# Patient Record
Sex: FEMALE | Race: BLACK OR AFRICAN AMERICAN | NOT HISPANIC OR LATINO | Employment: OTHER | URBAN - METROPOLITAN AREA
[De-identification: names, ages, dates, MRNs, and addresses within clinical notes are randomized per-mention and may not be internally consistent; named-entity substitution may affect disease eponyms.]

---

## 2017-07-18 PROBLEM — E83.59 CALCIPHYLAXIS: Status: ACTIVE | Noted: 2017-07-18

## 2017-07-18 PROBLEM — I48.91 ATRIAL FIBRILLATION: Status: ACTIVE | Noted: 2017-07-18

## 2017-07-18 PROBLEM — E83.51 HYPOCALCEMIA: Status: ACTIVE | Noted: 2017-07-18

## 2017-07-18 PROBLEM — E21.3 HYPERPARATHYROIDISM: Status: ACTIVE | Noted: 2017-07-18

## 2017-07-20 PROBLEM — L97.922 CALCIPHYLAXIS OF LEFT LOWER EXTREMITY WITH NONHEALING ULCER WITH FAT LAYER EXPOSED: Status: ACTIVE | Noted: 2017-07-20

## 2017-07-20 PROBLEM — E83.59 CALCIPHYLAXIS OF RIGHT LOWER EXTREMITY WITH NONHEALING ULCER WITH FAT LAYER EXPOSED: Status: ACTIVE | Noted: 2017-07-20

## 2017-07-20 PROBLEM — L97.912 CALCIPHYLAXIS OF RIGHT LOWER EXTREMITY WITH NONHEALING ULCER WITH FAT LAYER EXPOSED: Status: ACTIVE | Noted: 2017-07-20

## 2017-07-25 PROBLEM — D64.9 ANEMIA: Status: ACTIVE | Noted: 2017-07-25

## 2017-07-25 PROBLEM — R79.89 ELEVATED TROPONIN: Status: ACTIVE | Noted: 2017-07-25

## 2017-07-25 PROBLEM — I27.20 PULMONARY HYPERTENSION: Status: ACTIVE | Noted: 2017-07-25

## 2017-07-25 PROBLEM — I34.0 NON-RHEUMATIC MITRAL REGURGITATION: Status: ACTIVE | Noted: 2017-07-25

## 2017-08-01 ENCOUNTER — TELEPHONE (OUTPATIENT)
Dept: CARDIOLOGY | Facility: CLINIC | Age: 72
End: 2017-08-01

## 2017-08-01 NOTE — TELEPHONE ENCOUNTER
Patient was referred by Dr Barr for evaluation of mitral regurg.  She has ?HOCM and is on peritoneal dialysis.  Dr Bejarano has reviewed the echo with Dr Kapoor and they spoke with Dr Barr suggesting medical therapy.  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  Patient and family aware of decision.

## 2017-08-22 LAB
ALBUMIN SERPL-MCNC: 1.4 G/DL (ref 3.1–4.7)
ALP SERPL-CCNC: 113 IU/L (ref 40–104)
ALT (SGPT): 12 IU/L (ref 3–33)
AST SERPL-CCNC: 21 IU/L (ref 10–40)
BILIRUB SERPL-MCNC: 0.7 MG/DL (ref 0.3–1)
BNP SERPL-MCNC: 1659 PG/ML (ref 0–100)
BUN SERPL-MCNC: 19 MG/DL (ref 8–20)
CALCIUM SERPL-MCNC: 6.5 MG/DL (ref 7.7–10.4)
CHLORIDE: 93 MMOL/L (ref 98–110)
CO2 SERPL-SCNC: 28.1 MMOL/L (ref 22.8–31.6)
CREATININE: 6.46 MG/DL (ref 0.6–1.4)
GLUCOSE: 69 MG/DL (ref 70–99)
HCT VFR BLD AUTO: 30.8 % (ref 36–48)
HGB BLD-MCNC: 10.1 G/DL (ref 12–15)
MAGNESIUM SERPL-MCNC: 1.2 MG/DL (ref 1.5–2.6)
MCH RBC QN AUTO: 27.7 PG (ref 25–35)
MCHC RBC AUTO-ENTMCNC: 32.8 G/DL (ref 31–36)
MCV RBC AUTO: 84.4 FL (ref 79–98)
NUCLEATED RBCS: 0 %
PHOSPHATE FLD-MCNC: 3.3 MG/DL (ref 2.5–4.9)
PLATELET # BLD AUTO: 346 K/UL (ref 140–440)
POTASSIUM SERPL-SCNC: 2.6 MMOL/L (ref 3.5–5)
PROT SERPL-MCNC: 5.6 G/DL (ref 6–8.2)
RBC # BLD AUTO: 3.65 M/UL (ref 3.5–5.5)
SODIUM: 132 MMOL/L (ref 134–144)
WBC # BLD AUTO: 5.2 K/UL (ref 5–10)

## 2017-09-19 PROBLEM — Z99.2 PERITONEAL DIALYSIS CATHETER IN PLACE: Status: ACTIVE | Noted: 2017-09-19

## 2017-11-13 ENCOUNTER — OFFICE VISIT (OUTPATIENT)
Dept: CARDIOLOGY | Facility: CLINIC | Age: 72
End: 2017-11-13
Payer: MEDICARE

## 2017-11-13 ENCOUNTER — HOSPITAL ENCOUNTER (OUTPATIENT)
Dept: CARDIOLOGY | Facility: CLINIC | Age: 72
Discharge: HOME | End: 2017-11-13
Payer: MEDICARE

## 2017-11-13 VITALS
SYSTOLIC BLOOD PRESSURE: 104 MMHG | WEIGHT: 113.75 LBS | BODY MASS INDEX: 18.95 KG/M2 | DIASTOLIC BLOOD PRESSURE: 58 MMHG | OXYGEN SATURATION: 99 % | HEIGHT: 65 IN | HEART RATE: 76 BPM

## 2017-11-13 DIAGNOSIS — Z99.2 PERITONEAL DIALYSIS CATHETER IN PLACE: ICD-10-CM

## 2017-11-13 DIAGNOSIS — E21.3 HYPERPARATHYROIDISM: ICD-10-CM

## 2017-11-13 DIAGNOSIS — I10 ESSENTIAL HYPERTENSION: ICD-10-CM

## 2017-11-13 DIAGNOSIS — N18.6 TYPE 2 DIABETES MELLITUS WITH CHRONIC KIDNEY DISEASE ON CHRONIC DIALYSIS, WITHOUT LONG-TERM CURRENT USE OF INSULIN: ICD-10-CM

## 2017-11-13 DIAGNOSIS — I48.0 PAROXYSMAL ATRIAL FIBRILLATION: Primary | ICD-10-CM

## 2017-11-13 DIAGNOSIS — Z99.2 TYPE 2 DIABETES MELLITUS WITH CHRONIC KIDNEY DISEASE ON CHRONIC DIALYSIS, WITHOUT LONG-TERM CURRENT USE OF INSULIN: ICD-10-CM

## 2017-11-13 DIAGNOSIS — R06.00 PND (PAROXYSMAL NOCTURNAL DYSPNEA): ICD-10-CM

## 2017-11-13 DIAGNOSIS — I27.20 PULMONARY HYPERTENSION: ICD-10-CM

## 2017-11-13 DIAGNOSIS — I34.0 MITRAL VALVE INSUFFICIENCY, UNSPECIFIED ETIOLOGY: ICD-10-CM

## 2017-11-13 DIAGNOSIS — N18.6 ESRD (END STAGE RENAL DISEASE): ICD-10-CM

## 2017-11-13 DIAGNOSIS — I34.0 NON-RHEUMATIC MITRAL REGURGITATION: ICD-10-CM

## 2017-11-13 DIAGNOSIS — E11.22 TYPE 2 DIABETES MELLITUS WITH CHRONIC KIDNEY DISEASE ON CHRONIC DIALYSIS, WITHOUT LONG-TERM CURRENT USE OF INSULIN: ICD-10-CM

## 2017-11-13 DIAGNOSIS — I34.0 MITRAL VALVE INSUFFICIENCY, UNSPECIFIED ETIOLOGY: Primary | ICD-10-CM

## 2017-11-13 PROBLEM — I50.30 DIASTOLIC HEART FAILURE: Status: ACTIVE | Noted: 2017-11-13

## 2017-11-13 LAB
ESTIMATED PA SYSTOLIC PRESSURE: 29.42
MITRAL VALVE MOBILITY: ABNORMAL
MITRAL VALVE REGURGITATION: ABNORMAL
MITRAL VALVE STENOSIS: ABNORMAL
RETIRED EF AND QEF - SEE NOTES: 75 (ref 55–65)
TRICUSPID VALVE REGURGITATION: ABNORMAL

## 2017-11-13 PROCEDURE — 99203 OFFICE O/P NEW LOW 30 MIN: CPT | Mod: S$GLB,,, | Performed by: INTERNAL MEDICINE

## 2017-11-13 PROCEDURE — 99999 PR PBB SHADOW E&M-EST. PATIENT-LVL III: CPT | Mod: PBBFAC,,, | Performed by: INTERNAL MEDICINE

## 2017-11-13 PROCEDURE — 93306 TTE W/DOPPLER COMPLETE: CPT | Mod: S$GLB,,, | Performed by: INTERNAL MEDICINE

## 2017-11-13 RX ORDER — LORAZEPAM 0.5 MG/1
0.5 TABLET ORAL DAILY PRN
COMMUNITY
End: 2019-03-07

## 2017-11-13 RX ORDER — MIDODRINE HYDROCHLORIDE 5 MG/1
2.5 TABLET ORAL 2 TIMES DAILY WITH MEALS
COMMUNITY
End: 2019-03-07

## 2017-11-13 RX ORDER — TRAMADOL HYDROCHLORIDE 100 MG/1
50 TABLET, EXTENDED RELEASE ORAL DAILY
COMMUNITY
End: 2019-03-07

## 2017-11-13 NOTE — PROGRESS NOTES
"Subjective:    Patient ID:  Griselda Neely is a 72 y.o. female who presents for evaluation of diastolic HF, MR, and concentric LVH.    REF:  Alonso Barr    HPI: This patient of Alonso Barr has ESRD on PD and multiple recurrent admissions for CHF. By history, she has MAC, Moderate to severe MR, aortic valve calcification, severe TR, LAE, and concentric LVH.  Her echo was reviewed by me and Gene and I told Wes that she was best managed conservatively, however, she wanted to come and see me anyway.  She is relatively asymptomatic unless she gets "fluid in the lungs" and she is usually admitted and dialyzed for this problem.      Review of Systems   Constitution: Negative for weakness, malaise/fatigue, weight gain and weight loss.   HENT: Negative for congestion, nosebleeds and sore throat.    Eyes: Negative for blurred vision, double vision, pain and visual disturbance.   Cardiovascular: Negative for chest pain, claudication, cyanosis, dyspnea on exertion, irregular heartbeat, leg swelling, near-syncope, orthopnea, palpitations, paroxysmal nocturnal dyspnea and syncope.   Respiratory: Negative for cough, hemoptysis, shortness of breath, snoring, sputum production and wheezing.    Endocrine: Negative for polydipsia and polyuria.   Hematologic/Lymphatic: Negative for bleeding problem. Does not bruise/bleed easily.   Skin: Negative for color change, poor wound healing and rash.   Musculoskeletal: Negative for arthritis, back pain, joint pain, muscle weakness, myalgias and neck pain.   Gastrointestinal: Negative for abdominal pain, anorexia, constipation, diarrhea, heartburn, hematemesis, hematochezia, hemorrhoids, jaundice, melena, nausea and vomiting.   Genitourinary: Negative for flank pain, hematuria, hesitancy, incomplete emptying, menorrhagia and nocturia.   Neurological: Negative for excessive daytime sleepiness, dizziness, focal weakness, headaches, light-headedness, loss of balance, numbness, paresthesias, " seizures, sensory change, tremors and vertigo.   Psychiatric/Behavioral: Negative for altered mental status, depression, hallucinations and memory loss. The patient does not have insomnia and is not nervous/anxious.         Objective:    Physical Exam   Constitutional: She appears well-developed.   thin   HENT:   Head: Normocephalic.   Eyes: Pupils are equal, round, and reactive to light.   Neck: Normal range of motion. Neck supple. Normal carotid pulses, no hepatojugular reflux and no JVD present. No spinous process tenderness and no muscular tenderness present. Carotid bruit is not present. No neck rigidity. No tracheal deviation, no edema, no erythema and normal range of motion present. No thyromegaly present.   Cardiovascular: Normal rate, regular rhythm, S1 normal, S2 normal and intact distal pulses.  PMI is not displaced.  Exam reveals no gallop, no distant heart sounds, no friction rub, no midsystolic click, no opening snap and no decreased pulses.    Murmur heard.  High-pitched blowing holosystolic murmur is present with a grade of 3/6  at the apex  Pulses:       Carotid pulses are 2+ on the right side, and 2+ on the left side.       Radial pulses are 2+ on the right side, and 2+ on the left side.        Femoral pulses are 2+ on the right side, and 2+ on the left side.       Popliteal pulses are 2+ on the right side, and 2+ on the left side.        Dorsalis pedis pulses are 2+ on the right side, and 2+ on the left side.        Posterior tibial pulses are 2+ on the right side, and 2+ on the left side.   Pulmonary/Chest: No accessory muscle usage. No tachypnea. No respiratory distress. She has no decreased breath sounds. She has no wheezes. She has no rhonchi. She has no rales. She exhibits no tenderness.   Abdominal: Normal appearance. She exhibits no distension, no abdominal bruit, no ascites and no mass. There is no hepatosplenomegaly. There is no tenderness. There is no rebound, no guarding and no CVA  tenderness. No hernia.   Genitourinary: No breast swelling, tenderness, discharge or bleeding.   Musculoskeletal: She exhibits no edema or tenderness.        Right shoulder: She exhibits normal range of motion, no tenderness, no bony tenderness, no swelling, no effusion, no deformity, no laceration, no pain, normal pulse and normal strength.   Lymphadenopathy:     She has no cervical adenopathy.   Neurological: She is alert. She has normal reflexes. She displays no atrophy and no tremor. No cranial nerve deficit or sensory deficit. She exhibits normal muscle tone. She displays a negative Romberg sign. She displays no seizure activity. Coordination and gait normal.   Reflex Scores:       Tricep reflexes are 2+ on the right side and 2+ on the left side.       Bicep reflexes are 2+ on the right side and 2+ on the left side.       Brachioradialis reflexes are 2+ on the right side and 2+ on the left side.       Patellar reflexes are 2+ on the right side and 2+ on the left side.       Achilles reflexes are 2+ on the right side and 2+ on the left side.  Skin: Skin is warm, dry and intact. No abrasion, no bruising, no ecchymosis, no lesion and no rash noted. She is not diaphoretic. No cyanosis or erythema. No pallor. Nails show no clubbing.   Psychiatric: She has a normal mood and affect. Her speech is normal and behavior is normal. Judgment and thought content normal. Cognition and memory are normal.         Assessment:       1. Paroxysmal atrial fibrillation    2. Essential hypertension    3. ESRD (end stage renal disease): On PD.    4. Hyperparathyroidism    5. Type 2 diabetes mellitus with chronic kidney disease on chronic dialysis, without long-term current use of insulin    6. Peritoneal dialysis catheter in place    7. Non-rheumatic mitral regurgitation: by exam and echo report.  Current echo pending.   8. Pulmonary hypertension    9. PND (paroxysmal nocturnal dyspnea)    10.    Diastolic HF by history with  concentric LVH, MR, and TR.  NYHA Class II now.       Plan:       TTE today.  Will review and see her after the echo.    Addendum: Echo reviewed.  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  This is an inoperable situation.  She isn't a candidate for mitral clip because of MS.  She isn't a candidate for MVR because of MAC and ESRD on HD. The best management for her is to keep her HR as slow as possible using a combination of calcium channel blockers and beta blockers for both the HOCM and MS.  Will send this recommendation to Dr. Leonel Barr.

## 2017-11-13 NOTE — LETTER
November 13, 2017      Alonso Barr MD  [address]  [address]  [place] [address]           [facility]  [address]  [facility] [address]  Phone: [phone]          Patient: Griselda Neely   MR Number: [id number]   YOB: 1945   Date of Visit: 11/13/2017       Dear Dr. Alonso Barr:    Thank you for referring Griselda Neely to me for evaluation. Attached you will find relevant portions of my assessment and plan of care.    If you have questions, please do not hesitate to call me. I look forward to following Griselda Neely along with you.    Sincerely,    Rodríguez Bejarano MD    Enclosure  CC:  No Recipients    If you would like to receive this communication electronically, please contact [email or web address] or [phone] to request more information on [employer] access.    For providers and/or their staff who would like to refer a patient to [facility], please contact us through our one-stop-shop provider referral line, [facility], at [phone].    If you feel you have received this communication in error or would no longer like to receive these types of communications, please e-mail [email or web address]

## 2017-11-13 NOTE — PROGRESS NOTES
Subjective:    Patient ID:  Griselda Neely is a 72 y.o. female who presents for evaluation of MR.  Referring: Dr. Dove     HPI   Ms. Neely is a 71-year-old lady here for evaluation of Mr. She has a PMHx of hypertension, hyperlipidemia, end-stage renal disease, hyperparathyroidism, s/p parathyroidectomy,  and atrial fibrillation managed by Dr. Barr on amiodarone and Plavix.        ROS     Objective:    Physical Exam      Assessment:       1. Non-rheumatic mitral regurgitation- ECHO 7/16, unable to access. Will repeat ECHO today    2. Essential hypertension - Controlled on    3. ESRD (end stage renal disease) -on PD   4. Hyperparathyroidism - s/p parathyroidectomy    5. Type 2 diabetes mellitus with chronic kidney disease on chronic dialysis, without long-term current use of insulin    6. Peritoneal dialysis catheter in place    7.  Paroxysmal atrial fibrillation -On amiodarone        Plan:       ECHO

## 2019-03-07 ENCOUNTER — OFFICE VISIT (OUTPATIENT)
Dept: PULMONOLOGY | Facility: CLINIC | Age: 74
End: 2019-03-07
Payer: MEDICARE

## 2019-03-07 VITALS
WEIGHT: 146 LBS | BODY MASS INDEX: 24.32 KG/M2 | SYSTOLIC BLOOD PRESSURE: 130 MMHG | HEIGHT: 65 IN | OXYGEN SATURATION: 99 % | DIASTOLIC BLOOD PRESSURE: 80 MMHG | HEART RATE: 81 BPM

## 2019-03-07 DIAGNOSIS — J43.8 OTHER EMPHYSEMA: ICD-10-CM

## 2019-03-07 DIAGNOSIS — R09.02 HYPOXEMIA: ICD-10-CM

## 2019-03-07 DIAGNOSIS — F41.9 ANXIETY: ICD-10-CM

## 2019-03-07 DIAGNOSIS — Z87.891 PERSONAL HISTORY OF TOBACCO USE, PRESENTING HAZARDS TO HEALTH: ICD-10-CM

## 2019-03-07 DIAGNOSIS — J44.9 CHRONIC OBSTRUCTIVE PULMONARY DISEASE, UNSPECIFIED COPD TYPE: Primary | ICD-10-CM

## 2019-03-07 DIAGNOSIS — I27.20 PULMONARY HYPERTENSION: ICD-10-CM

## 2019-03-07 PROCEDURE — 99214 OFFICE O/P EST MOD 30 MIN: CPT | Mod: ,,, | Performed by: INTERNAL MEDICINE

## 2019-03-07 PROCEDURE — 99214 PR OFFICE/OUTPT VISIT, EST, LEVL IV, 30-39 MIN: ICD-10-PCS | Mod: ,,, | Performed by: INTERNAL MEDICINE

## 2019-03-07 RX ORDER — NAPROXEN SODIUM 220 MG/1
81 TABLET, FILM COATED ORAL DAILY
COMMUNITY
End: 2020-02-02

## 2019-03-07 RX ORDER — ISOSORBIDE MONONITRATE 30 MG/1
30 TABLET, EXTENDED RELEASE ORAL DAILY
COMMUNITY
End: 2021-01-11 | Stop reason: SDUPTHER

## 2019-03-07 RX ORDER — ALPRAZOLAM 0.25 MG/1
0.25 TABLET ORAL 2 TIMES DAILY PRN
Qty: 30 TABLET | Refills: 1 | Status: SHIPPED | OUTPATIENT
Start: 2019-03-07 | End: 2019-08-06 | Stop reason: ALTCHOICE

## 2019-03-07 RX ORDER — BUDESONIDE AND FORMOTEROL FUMARATE DIHYDRATE 160; 4.5 UG/1; UG/1
2 AEROSOL RESPIRATORY (INHALATION) EVERY 12 HOURS
Status: ON HOLD | COMMUNITY
End: 2019-08-09 | Stop reason: HOSPADM

## 2019-03-07 RX ORDER — METOPROLOL SUCCINATE 25 MG/1
25 TABLET, EXTENDED RELEASE ORAL DAILY
Status: ON HOLD | COMMUNITY
End: 2019-08-09 | Stop reason: HOSPADM

## 2019-03-07 NOTE — PROGRESS NOTES
Office Visit    HPI:    3/7/2019 - Here for follow up from hospital.  Has been stable at home but does have episodes of dyspnea .  Has seen Dr Griffin and has been recommended for valve surgery.  No new symptoms.  Had long d/w pt about her diagnoses.  She is requesting something for anxiety.  Has not been smoking since hospital stay.    Medications      Current Outpatient Medications:     albuterol-ipratropium 2.5mg-0.5mg/3mL (DUO-NEB) 0.5 mg-3 mg(2.5 mg base)/3 mL nebulizer solution, Take 3 mLs by nebulization every 6 (six) hours as needed for Wheezing. Rescue, Disp: 20 vial, Rfl: 0    aspirin (ECOTRIN) 81 MG EC tablet, Take 81 mg by mouth once daily., Disp: , Rfl:     budesonide-formoterol 160-4.5 mcg (SYMBICORT) 160-4.5 mcg/actuation HFAA, Inhale 2 puffs into the lungs every 12 (twelve) hours. Controller, Disp: , Rfl:     isosorbide mononitrate (IMDUR) 30 MG 24 hr tablet, Take 30 mg by mouth once daily., Disp: , Rfl:     metoprolol succinate (TOPROL-XL) 25 MG 24 hr tablet, Take 25 mg by mouth once daily., Disp: , Rfl:     ALPRAZolam (XANAX) 0.25 MG tablet, Take 1 tablet (0.25 mg total) by mouth 2 (two) times daily as needed for Anxiety., Disp: 30 tablet, Rfl: 1    Allergies    Review of patient's allergies indicates:  No Known Allergies    Social History    Social History     Tobacco Use   Smoking Status Current Some Day Smoker    Packs/day: 0.50    Years: 45.00    Pack years: 22.50   Smokeless Tobacco Never Used       ETOH - no drinks per week.    Drug Use - no    Occupation - retired    Family History    Family History   Problem Relation Age of Onset    Heart disease Sister     Early death Sister         heart as baby    Heart disease Maternal Grandfather     Diabetes Mother     Hypertension Mother     Heart failure Mother     Heart disease Mother     Arthritis Mother     Diabetes Father     Early death Sister         infant       ROS  Review of Systems   Constitutional: Positive for  "malaise/fatigue. Negative for chills, diaphoresis, fever and weight loss.   HENT: Negative for congestion.    Eyes: Negative for pain.   Respiratory: Positive for shortness of breath. Negative for cough, hemoptysis, sputum production, wheezing and stridor.    Cardiovascular: Negative for chest pain, palpitations, orthopnea, claudication, leg swelling and PND.   Gastrointestinal: Negative for abdominal pain, constipation, diarrhea, heartburn, nausea and vomiting.   Genitourinary: Negative for dysuria, frequency and urgency.   Musculoskeletal: Negative for falls and myalgias.   Neurological: Negative for dizziness, tingling, tremors, sensory change, speech change, focal weakness, seizures, loss of consciousness, weakness and headaches.   Psychiatric/Behavioral: Negative for depression, substance abuse and suicidal ideas. The patient is not nervous/anxious.        Physical Exam    Vitals:    03/07/19 0909   BP: 130/80   Pulse: 81   SpO2: 99%   Weight: 66.2 kg (146 lb)   Height: 5' 5" (1.651 m)       Physical Exam   Constitutional: She is oriented to person, place, and time. She appears well-developed and well-nourished. No distress.   HENT:   Head: Normocephalic and atraumatic.   Right Ear: External ear normal.   Left Ear: External ear normal.   Nose: Nose normal.   Mouth/Throat: Oropharynx is clear and moist.   Eyes: Conjunctivae and EOM are normal. Pupils are equal, round, and reactive to light.   Neck: Normal range of motion. Neck supple. No JVD present. No tracheal deviation present. No thyromegaly present.   Cardiovascular: Normal rate, regular rhythm and intact distal pulses. Exam reveals no gallop and no friction rub.   Murmur heard.  Pulmonary/Chest: Effort normal. No stridor. No respiratory distress. She has no wheezes. She has rales. She exhibits no tenderness.   Few rales   Abdominal: Soft. Bowel sounds are normal. She exhibits no distension. There is no tenderness.   Musculoskeletal: Normal range of motion. " She exhibits no edema or tenderness.   Lymphadenopathy:     She has no cervical adenopathy.   Neurological: She is alert and oriented to person, place, and time. No cranial nerve deficit.   Skin: Skin is warm and dry. She is not diaphoretic.   Psychiatric: She has a normal mood and affect. Her behavior is normal.   Nursing note and vitals reviewed.      Lab    @RESUFAST (WBC,HGB,HCT,PLT)@    Sodium   Date Value Ref Range Status   09/19/2017 137 136 - 145 mmol/L Final   08/22/2017 132 (L) 134 - 144 mmol/L      Potassium   Date Value Ref Range Status   09/19/2017 3.7 3.5 - 5.1 mmol/L Final     Chloride   Date Value Ref Range Status   09/19/2017 103 95 - 110 mmol/L Final   08/22/2017 93 (L) 98 - 110 mmol/L      CO2   Date Value Ref Range Status   09/19/2017 28 22 - 31 mmol/L Final     Glucose   Date Value Ref Range Status   09/19/2017 61 (L) 70 - 110 mg/dL Final     Comment:     The ADA recommends the following guidelines for fasting glucose:  Normal:       less than 100 mg/dL  Prediabetes:  100 mg/dL to 125 mg/dL  Diabetes:     126 mg/dL or higher     08/22/2017 69 (L) 70 - 99 mg/dL      BUN, Bld   Date Value Ref Range Status   09/19/2017 20 (H) 7 - 18 mg/dL Final     Creatinine   Date Value Ref Range Status   09/19/2017 5.19 (H) 0.50 - 1.40 mg/dL Final   08/22/2017 6.46 (H) 0.60 - 1.40 mg/dL    07/17/2012 1.4 0.2 - 1.4 mg/dl Final     Calcium   Date Value Ref Range Status   09/19/2017 6.6 (LL) 8.4 - 10.2 mg/dL Final     Comment:     CA  critical result(s) called and verbal readback obtained from   Lexiiop Risa, 09/19/2017 06:55     07/17/2012 9.1 8.6 - 10.2 mg/dl Final     Total Protein   Date Value Ref Range Status   08/22/2017 5.6 (L) 6.0 - 8.2 g/dL      Albumin   Date Value Ref Range Status   08/22/2017 1.4 (L) 3.1 - 4.7 g/dL      Total Bilirubin   Date Value Ref Range Status   08/22/2017 0.7 0.3 - 1.0 mg/dL      Alkaline Phosphatase   Date Value Ref Range Status   08/22/2017 113 (H) 40 - 104 IU/L    07/15/2012  71 23 - 119 UNIT/L Final     AST   Date Value Ref Range Status   08/22/2017 21 10 - 40 IU/L    07/15/2012 13 10 - 30 UNIT/L Final     ALT   Date Value Ref Range Status   07/25/2017 12 10 - 44 U/L Final     Anion Gap   Date Value Ref Range Status   09/19/2017 6 (L) 8 - 16 mmol/L Final   07/17/2012 6 5 - 15 meq/L Final         Xray        Impression/Plan    Problem List Items Addressed This Visit        Psychiatric    Anxiety  - will give low dose xanax for anxiety (d/w pt)       Pulmonary    Pulmonary hypertension  - due to group 2 and group 3    Other emphysema  - will order studies  - RTC 3 months    Hypoxemia  - continue with oxygen       Other    Personal history of tobacco use, presenting hazards to health  - not smoking at this      Other Visit Diagnoses     Chronic obstructive pulmonary disease, unspecified COPD type    -  Primary    Relevant Orders    Complete PFT with bronchodilator        Ian Mcallister MD

## 2019-03-20 ENCOUNTER — HISTORICAL (OUTPATIENT)
Dept: ADMINISTRATIVE | Facility: HOSPITAL | Age: 74
End: 2019-03-20

## 2019-03-22 ENCOUNTER — OUTSIDE PLACE OF SERVICE (OUTPATIENT)
Dept: PULMONOLOGY | Facility: CLINIC | Age: 74
End: 2019-03-22
Payer: MEDICARE

## 2019-03-22 PROCEDURE — 99232 SBSQ HOSP IP/OBS MODERATE 35: CPT | Mod: ,,, | Performed by: INTERNAL MEDICINE

## 2019-03-22 PROCEDURE — 99232 PR SUBSEQUENT HOSPITAL CARE,LEVL II: ICD-10-PCS | Mod: ,,, | Performed by: INTERNAL MEDICINE

## 2019-03-23 ENCOUNTER — OUTSIDE PLACE OF SERVICE (OUTPATIENT)
Dept: PULMONOLOGY | Facility: CLINIC | Age: 74
End: 2019-03-23
Payer: MEDICARE

## 2019-03-23 PROCEDURE — 99232 PR SUBSEQUENT HOSPITAL CARE,LEVL II: ICD-10-PCS | Mod: ,,, | Performed by: INTERNAL MEDICINE

## 2019-03-23 PROCEDURE — 99232 SBSQ HOSP IP/OBS MODERATE 35: CPT | Mod: ,,, | Performed by: INTERNAL MEDICINE

## 2019-03-24 ENCOUNTER — OUTSIDE PLACE OF SERVICE (OUTPATIENT)
Dept: PULMONOLOGY | Facility: CLINIC | Age: 74
End: 2019-03-24
Payer: MEDICARE

## 2019-03-24 PROCEDURE — 99231 PR SUBSEQUENT HOSPITAL CARE,LEVL I: ICD-10-PCS | Mod: ,,, | Performed by: INTERNAL MEDICINE

## 2019-03-24 PROCEDURE — 99231 SBSQ HOSP IP/OBS SF/LOW 25: CPT | Mod: ,,, | Performed by: INTERNAL MEDICINE

## 2019-08-06 ENCOUNTER — HOSPITAL ENCOUNTER (INPATIENT)
Facility: HOSPITAL | Age: 74
LOS: 13 days | Discharge: HOME | DRG: 193 | End: 2019-08-19
Attending: EMERGENCY MEDICINE | Admitting: INTERNAL MEDICINE
Payer: MEDICARE

## 2019-08-06 DIAGNOSIS — G92.9 ENCEPHALOPATHY, TOXIC: ICD-10-CM

## 2019-08-06 DIAGNOSIS — I48.91 ATRIAL FIBRILLATION WITH RAPID VENTRICULAR RESPONSE: ICD-10-CM

## 2019-08-06 DIAGNOSIS — J90 PLEURAL EFFUSION: ICD-10-CM

## 2019-08-06 DIAGNOSIS — R41.82 ALTERED MENTAL STATUS, UNSPECIFIED ALTERED MENTAL STATUS TYPE: Primary | ICD-10-CM

## 2019-08-06 DIAGNOSIS — R56.9 SEIZURE: ICD-10-CM

## 2019-08-06 DIAGNOSIS — E16.2 HYPOGLYCEMIA: ICD-10-CM

## 2019-08-06 DIAGNOSIS — I63.9 STROKE: ICD-10-CM

## 2019-08-06 DIAGNOSIS — N18.6 ESRD (END STAGE RENAL DISEASE): ICD-10-CM

## 2019-08-06 DIAGNOSIS — J40 BRONCHITIS: ICD-10-CM

## 2019-08-06 DIAGNOSIS — R06.02 SHORTNESS OF BREATH: ICD-10-CM

## 2019-08-06 DIAGNOSIS — A41.9 SEPSIS, DUE TO UNSPECIFIED ORGANISM: ICD-10-CM

## 2019-08-06 PROBLEM — N18.9 ANEMIA DUE TO CHRONIC KIDNEY DISEASE: Status: ACTIVE | Noted: 2017-07-25

## 2019-08-06 PROBLEM — R79.89 ELEVATED LACTIC ACID LEVEL: Status: ACTIVE | Noted: 2019-08-06

## 2019-08-06 PROBLEM — D63.1 ANEMIA DUE TO CHRONIC KIDNEY DISEASE: Status: ACTIVE | Noted: 2017-07-25

## 2019-08-06 PROBLEM — G93.89 ENCEPHALOMALACIA ON IMAGING STUDY: Status: ACTIVE | Noted: 2019-08-06

## 2019-08-06 PROBLEM — J18.9 PNEUMONIA DUE TO INFECTIOUS ORGANISM: Status: ACTIVE | Noted: 2019-08-06

## 2019-08-06 PROBLEM — R41.0 DISORIENTATION: Status: ACTIVE | Noted: 2019-08-06

## 2019-08-06 LAB
ALBUMIN SERPL BCP-MCNC: 3.2 G/DL (ref 3.5–5.2)
ALBUMIN SERPL BCP-MCNC: 3.2 G/DL (ref 3.5–5.2)
ALP SERPL-CCNC: 54 U/L (ref 55–135)
ALP SERPL-CCNC: 54 U/L (ref 55–135)
ALT SERPL W/O P-5'-P-CCNC: 8 U/L (ref 10–44)
ALT SERPL W/O P-5'-P-CCNC: 8 U/L (ref 10–44)
ANION GAP SERPL CALC-SCNC: 16 MMOL/L (ref 8–16)
ANION GAP SERPL CALC-SCNC: 16 MMOL/L (ref 8–16)
AST SERPL-CCNC: 18 U/L (ref 10–40)
AST SERPL-CCNC: 18 U/L (ref 10–40)
BASOPHILS # BLD AUTO: 0.01 K/UL (ref 0–0.2)
BASOPHILS # BLD AUTO: 0.01 K/UL (ref 0–0.2)
BASOPHILS NFR BLD: 0.2 % (ref 0–1.9)
BASOPHILS NFR BLD: 0.2 % (ref 0–1.9)
BILIRUB SERPL-MCNC: 1.2 MG/DL (ref 0.1–1)
BILIRUB SERPL-MCNC: 1.2 MG/DL (ref 0.1–1)
BUN SERPL-MCNC: 20 MG/DL (ref 8–23)
BUN SERPL-MCNC: 20 MG/DL (ref 8–23)
CALCIUM SERPL-MCNC: 9.7 MG/DL (ref 8.7–10.5)
CALCIUM SERPL-MCNC: 9.7 MG/DL (ref 8.7–10.5)
CHLORIDE SERPL-SCNC: 95 MMOL/L (ref 95–110)
CHLORIDE SERPL-SCNC: 95 MMOL/L (ref 95–110)
CHOLEST SERPL-MCNC: 148 MG/DL (ref 120–199)
CHOLEST/HDLC SERPL: 2.1 {RATIO} (ref 2–5)
CO2 SERPL-SCNC: 29 MMOL/L (ref 23–29)
CO2 SERPL-SCNC: 29 MMOL/L (ref 23–29)
CREAT SERPL-MCNC: 7.2 MG/DL (ref 0.5–1.4)
CREAT SERPL-MCNC: 7.2 MG/DL (ref 0.5–1.4)
DIFFERENTIAL METHOD: ABNORMAL
DIFFERENTIAL METHOD: ABNORMAL
EOSINOPHIL # BLD AUTO: 0 K/UL (ref 0–0.5)
EOSINOPHIL # BLD AUTO: 0 K/UL (ref 0–0.5)
EOSINOPHIL NFR BLD: 0.2 % (ref 0–8)
EOSINOPHIL NFR BLD: 0.2 % (ref 0–8)
ERYTHROCYTE [DISTWIDTH] IN BLOOD BY AUTOMATED COUNT: 22.4 % (ref 11.5–14.5)
ERYTHROCYTE [DISTWIDTH] IN BLOOD BY AUTOMATED COUNT: 22.4 % (ref 11.5–14.5)
EST. GFR  (AFRICAN AMERICAN): 6 ML/MIN/1.73 M^2
EST. GFR  (AFRICAN AMERICAN): 6 ML/MIN/1.73 M^2
EST. GFR  (NON AFRICAN AMERICAN): 5.2 ML/MIN/1.73 M^2
EST. GFR  (NON AFRICAN AMERICAN): 5.2 ML/MIN/1.73 M^2
GLUCOSE SERPL-MCNC: 154 MG/DL (ref 70–110)
GLUCOSE SERPL-MCNC: 154 MG/DL (ref 70–110)
GLUCOSE SERPL-MCNC: 54 MG/DL (ref 70–110)
GLUCOSE SERPL-MCNC: 78 MG/DL (ref 70–110)
HCT VFR BLD AUTO: 36 % (ref 37–48.5)
HCT VFR BLD AUTO: 36 % (ref 37–48.5)
HDLC SERPL-MCNC: 69 MG/DL (ref 40–75)
HDLC SERPL: 46.6 % (ref 20–50)
HGB BLD-MCNC: 11.2 G/DL (ref 12–16)
HGB BLD-MCNC: 11.2 G/DL (ref 12–16)
IMM GRANULOCYTES # BLD AUTO: 0.02 K/UL (ref 0–0.04)
IMM GRANULOCYTES # BLD AUTO: 0.02 K/UL (ref 0–0.04)
IMM GRANULOCYTES NFR BLD AUTO: 0.4 % (ref 0–0.5)
IMM GRANULOCYTES NFR BLD AUTO: 0.4 % (ref 0–0.5)
INR PPP: 1.1
INR PPP: 1.1
LACTATE SERPL-SCNC: 2 MMOL/L (ref 0.5–1.9)
LDLC SERPL CALC-MCNC: 55.2 MG/DL (ref 63–159)
LYMPHOCYTES # BLD AUTO: 0.8 K/UL (ref 1–4.8)
LYMPHOCYTES # BLD AUTO: 0.8 K/UL (ref 1–4.8)
LYMPHOCYTES NFR BLD: 15.1 % (ref 18–48)
LYMPHOCYTES NFR BLD: 15.1 % (ref 18–48)
MCH RBC QN AUTO: 26 PG (ref 27–31)
MCH RBC QN AUTO: 26 PG (ref 27–31)
MCHC RBC AUTO-ENTMCNC: 31.1 G/DL (ref 32–36)
MCHC RBC AUTO-ENTMCNC: 31.1 G/DL (ref 32–36)
MCV RBC AUTO: 84 FL (ref 82–98)
MCV RBC AUTO: 84 FL (ref 82–98)
MONOCYTES # BLD AUTO: 0.7 K/UL (ref 0.3–1)
MONOCYTES # BLD AUTO: 0.7 K/UL (ref 0.3–1)
MONOCYTES NFR BLD: 13 % (ref 4–15)
MONOCYTES NFR BLD: 13 % (ref 4–15)
NEUTROPHILS # BLD AUTO: 3.7 K/UL (ref 1.8–7.7)
NEUTROPHILS # BLD AUTO: 3.7 K/UL (ref 1.8–7.7)
NEUTROPHILS NFR BLD: 71.1 % (ref 38–73)
NEUTROPHILS NFR BLD: 71.1 % (ref 38–73)
NONHDLC SERPL-MCNC: 79 MG/DL
NRBC BLD-RTO: 1 /100 WBC
NRBC BLD-RTO: 1 /100 WBC
PLATELET # BLD AUTO: 138 K/UL (ref 150–350)
PLATELET # BLD AUTO: 138 K/UL (ref 150–350)
PMV BLD AUTO: 10 FL (ref 9.2–12.9)
PMV BLD AUTO: 10 FL (ref 9.2–12.9)
POTASSIUM SERPL-SCNC: 3.9 MMOL/L (ref 3.5–5.1)
POTASSIUM SERPL-SCNC: 3.9 MMOL/L (ref 3.5–5.1)
PROT SERPL-MCNC: 7.2 G/DL (ref 6–8.4)
PROT SERPL-MCNC: 7.2 G/DL (ref 6–8.4)
PROTHROMBIN TIME: 14.2 SEC (ref 11.7–14)
PROTHROMBIN TIME: 14.2 SEC (ref 11.7–14)
RBC # BLD AUTO: 4.31 M/UL (ref 4–5.4)
RBC # BLD AUTO: 4.31 M/UL (ref 4–5.4)
SODIUM SERPL-SCNC: 140 MMOL/L (ref 136–145)
SODIUM SERPL-SCNC: 140 MMOL/L (ref 136–145)
TRIGL SERPL-MCNC: 119 MG/DL (ref 30–150)
TROPONIN I SERPL DL<=0.01 NG/ML-MCNC: 0.09 NG/ML (ref 0.02–0.5)
TSH SERPL DL<=0.005 MIU/L-ACNC: 2.61 UIU/ML (ref 0.34–5.6)
WBC # BLD AUTO: 5.24 K/UL (ref 3.9–12.7)
WBC # BLD AUTO: 5.24 K/UL (ref 3.9–12.7)

## 2019-08-06 PROCEDURE — 84484 ASSAY OF TROPONIN QUANT: CPT

## 2019-08-06 PROCEDURE — 36415 COLL VENOUS BLD VENIPUNCTURE: CPT

## 2019-08-06 PROCEDURE — 63600175 PHARM REV CODE 636 W HCPCS: Performed by: NURSE PRACTITIONER

## 2019-08-06 PROCEDURE — 96376 TX/PRO/DX INJ SAME DRUG ADON: CPT

## 2019-08-06 PROCEDURE — 25000003 PHARM REV CODE 250: Performed by: NURSE PRACTITIONER

## 2019-08-06 PROCEDURE — 93005 ELECTROCARDIOGRAM TRACING: CPT

## 2019-08-06 PROCEDURE — 80061 LIPID PANEL: CPT

## 2019-08-06 PROCEDURE — 25000003 PHARM REV CODE 250: Performed by: EMERGENCY MEDICINE

## 2019-08-06 PROCEDURE — 84443 ASSAY THYROID STIM HORMONE: CPT

## 2019-08-06 PROCEDURE — 85610 PROTHROMBIN TIME: CPT

## 2019-08-06 PROCEDURE — 96375 TX/PRO/DX INJ NEW DRUG ADDON: CPT

## 2019-08-06 PROCEDURE — 85025 COMPLETE CBC W/AUTO DIFF WBC: CPT

## 2019-08-06 PROCEDURE — 83605 ASSAY OF LACTIC ACID: CPT

## 2019-08-06 PROCEDURE — 96366 THER/PROPH/DIAG IV INF ADDON: CPT

## 2019-08-06 PROCEDURE — 96361 HYDRATE IV INFUSION ADD-ON: CPT

## 2019-08-06 PROCEDURE — 63600175 PHARM REV CODE 636 W HCPCS: Performed by: EMERGENCY MEDICINE

## 2019-08-06 PROCEDURE — 21000000 HC CCU ICU ROOM CHARGE

## 2019-08-06 PROCEDURE — 87040 BLOOD CULTURE FOR BACTERIA: CPT | Mod: 59

## 2019-08-06 PROCEDURE — 82962 GLUCOSE BLOOD TEST: CPT

## 2019-08-06 PROCEDURE — 80053 COMPREHEN METABOLIC PANEL: CPT

## 2019-08-06 PROCEDURE — 87040 BLOOD CULTURE FOR BACTERIA: CPT

## 2019-08-06 PROCEDURE — 96365 THER/PROPH/DIAG IV INF INIT: CPT

## 2019-08-06 PROCEDURE — 99291 CRITICAL CARE FIRST HOUR: CPT

## 2019-08-06 RX ORDER — ASPIRIN 81 MG/1
81 TABLET ORAL DAILY
Status: DISCONTINUED | OUTPATIENT
Start: 2019-08-07 | End: 2019-08-19 | Stop reason: HOSPADM

## 2019-08-06 RX ORDER — CLINDAMYCIN HYDROCHLORIDE 300 MG/1
300 CAPSULE ORAL 3 TIMES DAILY
Status: ON HOLD | COMMUNITY
End: 2019-08-09 | Stop reason: HOSPADM

## 2019-08-06 RX ORDER — HYDROCODONE BITARTRATE AND ACETAMINOPHEN 5; 325 MG/1; MG/1
1 TABLET ORAL EVERY 6 HOURS PRN
Status: ON HOLD | COMMUNITY
End: 2019-08-09 | Stop reason: HOSPADM

## 2019-08-06 RX ORDER — LEVETIRACETAM 500 MG/1
500 TABLET ORAL DAILY
Status: DISCONTINUED | OUTPATIENT
Start: 2019-08-07 | End: 2019-08-12

## 2019-08-06 RX ORDER — DEXTROSE 50 % IN WATER (D50W) INTRAVENOUS SYRINGE
25
Status: DISCONTINUED | OUTPATIENT
Start: 2019-08-06 | End: 2019-08-06

## 2019-08-06 RX ORDER — DEXTROSE 50 % IN WATER (D50W) INTRAVENOUS SYRINGE
25
Status: COMPLETED | OUTPATIENT
Start: 2019-08-06 | End: 2019-08-06

## 2019-08-06 RX ORDER — HEPARIN SODIUM 5000 [USP'U]/ML
5000 INJECTION, SOLUTION INTRAVENOUS; SUBCUTANEOUS EVERY 8 HOURS
Status: DISCONTINUED | OUTPATIENT
Start: 2019-08-06 | End: 2019-08-19 | Stop reason: HOSPADM

## 2019-08-06 RX ORDER — VANCOMYCIN HCL IN 5 % DEXTROSE 1G/250ML
20 PLASTIC BAG, INJECTION (ML) INTRAVENOUS ONCE
Status: COMPLETED | OUTPATIENT
Start: 2019-08-06 | End: 2019-08-07

## 2019-08-06 RX ORDER — ACETAMINOPHEN 325 MG/1
650 TABLET ORAL EVERY 4 HOURS PRN
Status: ON HOLD | COMMUNITY
End: 2019-08-09 | Stop reason: HOSPADM

## 2019-08-06 RX ORDER — LEVETIRACETAM 250 MG/1
250 TABLET ORAL
Status: ON HOLD | COMMUNITY
End: 2019-08-09 | Stop reason: HOSPADM

## 2019-08-06 RX ORDER — ISOSORBIDE MONONITRATE 30 MG/1
30 TABLET, EXTENDED RELEASE ORAL DAILY
Status: DISCONTINUED | OUTPATIENT
Start: 2019-08-07 | End: 2019-08-07

## 2019-08-06 RX ORDER — INSULIN ASPART 100 [IU]/ML
0-5 INJECTION, SOLUTION INTRAVENOUS; SUBCUTANEOUS EVERY 6 HOURS PRN
Status: DISCONTINUED | OUTPATIENT
Start: 2019-08-06 | End: 2019-08-19 | Stop reason: HOSPADM

## 2019-08-06 RX ORDER — DILTIAZEM HYDROCHLORIDE 120 MG/1
120 TABLET, FILM COATED ORAL 4 TIMES DAILY
Status: ON HOLD | COMMUNITY
End: 2019-08-09 | Stop reason: HOSPADM

## 2019-08-06 RX ORDER — SODIUM CHLORIDE 9 MG/ML
500 INJECTION, SOLUTION INTRAVENOUS
Status: COMPLETED | OUTPATIENT
Start: 2019-08-06 | End: 2019-08-06

## 2019-08-06 RX ORDER — SEVELAMER CARBONATE 800 MG/1
800 TABLET, FILM COATED ORAL
COMMUNITY
End: 2019-09-29

## 2019-08-06 RX ORDER — DILTIAZEM HCL/D5W 125 MG/125
10 PLASTIC BAG, INJECTION (ML) INTRAVENOUS CONTINUOUS
Status: DISCONTINUED | OUTPATIENT
Start: 2019-08-06 | End: 2019-08-11

## 2019-08-06 RX ORDER — LEVETIRACETAM 500 MG/1
750 TABLET ORAL DAILY
COMMUNITY
End: 2019-09-29

## 2019-08-06 RX ORDER — ONDANSETRON 4 MG/1
4 TABLET, FILM COATED ORAL EVERY 6 HOURS PRN
COMMUNITY
End: 2019-10-03 | Stop reason: CLARIF

## 2019-08-06 RX ORDER — DILTIAZEM HYDROCHLORIDE 5 MG/ML
10 INJECTION INTRAVENOUS
Status: COMPLETED | OUTPATIENT
Start: 2019-08-06 | End: 2019-08-06

## 2019-08-06 RX ORDER — METOPROLOL SUCCINATE 25 MG/1
25 TABLET, EXTENDED RELEASE ORAL DAILY
Status: DISCONTINUED | OUTPATIENT
Start: 2019-08-07 | End: 2019-08-07

## 2019-08-06 RX ORDER — SODIUM CHLORIDE 0.9 % (FLUSH) 0.9 %
10 SYRINGE (ML) INJECTION
Status: DISCONTINUED | OUTPATIENT
Start: 2019-08-06 | End: 2019-08-19 | Stop reason: HOSPADM

## 2019-08-06 RX ORDER — LACOSAMIDE 50 MG/1
TABLET ORAL 2 TIMES DAILY
Status: ON HOLD | COMMUNITY
End: 2019-08-09 | Stop reason: HOSPADM

## 2019-08-06 RX ORDER — LOSARTAN POTASSIUM 25 MG/1
25 TABLET ORAL DAILY
COMMUNITY
End: 2021-01-11 | Stop reason: SDUPTHER

## 2019-08-06 RX ORDER — ASPIRIN 325 MG
325 TABLET, DELAYED RELEASE (ENTERIC COATED) ORAL
Status: COMPLETED | OUTPATIENT
Start: 2019-08-06 | End: 2019-08-06

## 2019-08-06 RX ORDER — FLUTICASONE FUROATE AND VILANTEROL 100; 25 UG/1; UG/1
1 POWDER RESPIRATORY (INHALATION) DAILY
COMMUNITY
End: 2020-02-02

## 2019-08-06 RX ORDER — LACOSAMIDE 50 MG/1
50 TABLET ORAL EVERY 12 HOURS
Status: DISCONTINUED | OUTPATIENT
Start: 2019-08-06 | End: 2019-08-07

## 2019-08-06 RX ORDER — GLUCAGON 1 MG
1 KIT INJECTION
Status: DISCONTINUED | OUTPATIENT
Start: 2019-08-06 | End: 2019-08-19 | Stop reason: HOSPADM

## 2019-08-06 RX ORDER — NITROGLYCERIN 0.3 MG/1
0.3 TABLET SUBLINGUAL EVERY 5 MIN PRN
Status: ON HOLD | COMMUNITY
End: 2019-08-09 | Stop reason: HOSPADM

## 2019-08-06 RX ORDER — ACETAMINOPHEN 10 MG/ML
1000 INJECTION, SOLUTION INTRAVENOUS ONCE
Status: COMPLETED | OUTPATIENT
Start: 2019-08-06 | End: 2019-08-06

## 2019-08-06 RX ORDER — TRAMADOL HYDROCHLORIDE 50 MG/1
50 TABLET ORAL EVERY 6 HOURS PRN
Status: ON HOLD | COMMUNITY
End: 2019-09-12 | Stop reason: HOSPADM

## 2019-08-06 RX ADMIN — CEFTRIAXONE SODIUM 2 G: 1 INJECTION, POWDER, FOR SOLUTION INTRAMUSCULAR; INTRAVENOUS at 06:08

## 2019-08-06 RX ADMIN — VANCOMYCIN HYDROCHLORIDE 1000 MG: 1 INJECTION, POWDER, FOR SOLUTION INTRAVENOUS at 11:08

## 2019-08-06 RX ADMIN — SODIUM CHLORIDE 1524 ML: 0.9 INJECTION, SOLUTION INTRAVENOUS at 08:08

## 2019-08-06 RX ADMIN — DILTIAZEM HYDROCHLORIDE 5 MG/HR: 5 INJECTION INTRAVENOUS at 06:08

## 2019-08-06 RX ADMIN — SODIUM CHLORIDE 500 ML: 0.9 INJECTION, SOLUTION INTRAVENOUS at 05:08

## 2019-08-06 RX ADMIN — ACETAMINOPHEN 1000 MG: 10 INJECTION, SOLUTION INTRAVENOUS at 10:08

## 2019-08-06 RX ADMIN — DOXYCYCLINE 100 MG: 100 INJECTION, POWDER, LYOPHILIZED, FOR SOLUTION INTRAVENOUS at 08:08

## 2019-08-06 RX ADMIN — ASPIRIN 325 MG: 325 TABLET, COATED ORAL at 09:08

## 2019-08-06 RX ADMIN — DILTIAZEM HYDROCHLORIDE 10 MG: 5 INJECTION INTRAVENOUS at 06:08

## 2019-08-06 RX ADMIN — DEXTROSE MONOHYDRATE 25 G: 500 INJECTION PARENTERAL at 05:08

## 2019-08-07 LAB
ALBUMIN SERPL BCP-MCNC: 2.9 G/DL (ref 3.5–5.2)
ALP SERPL-CCNC: 49 U/L (ref 55–135)
ALT SERPL W/O P-5'-P-CCNC: 6 U/L (ref 10–44)
ANION GAP SERPL CALC-SCNC: 15 MMOL/L (ref 8–16)
AST SERPL-CCNC: 17 U/L (ref 10–40)
BASOPHILS # BLD AUTO: 0.02 K/UL (ref 0–0.2)
BASOPHILS NFR BLD: 0.4 % (ref 0–1.9)
BILIRUB SERPL-MCNC: 0.8 MG/DL (ref 0.1–1)
BUN SERPL-MCNC: 19 MG/DL (ref 8–23)
CALCIUM SERPL-MCNC: 8.8 MG/DL (ref 8.7–10.5)
CHLORIDE SERPL-SCNC: 95 MMOL/L (ref 95–110)
CO2 SERPL-SCNC: 25 MMOL/L (ref 23–29)
CREAT SERPL-MCNC: 7 MG/DL (ref 0.5–1.4)
DIFFERENTIAL METHOD: ABNORMAL
EOSINOPHIL # BLD AUTO: 0.1 K/UL (ref 0–0.5)
EOSINOPHIL NFR BLD: 0.9 % (ref 0–8)
ERYTHROCYTE [DISTWIDTH] IN BLOOD BY AUTOMATED COUNT: 22.5 % (ref 11.5–14.5)
EST. GFR  (AFRICAN AMERICAN): 6.2 ML/MIN/1.73 M^2
EST. GFR  (NON AFRICAN AMERICAN): 5.4 ML/MIN/1.73 M^2
ESTIMATED AVG GLUCOSE: 103 MG/DL (ref 68–131)
GLUCOSE SERPL-MCNC: 68 MG/DL (ref 70–110)
GLUCOSE SERPL-MCNC: 70 MG/DL (ref 70–110)
GLUCOSE SERPL-MCNC: 78 MG/DL (ref 70–110)
GLUCOSE SERPL-MCNC: 79 MG/DL (ref 70–110)
HBA1C MFR BLD HPLC: 5.2 % (ref 4.5–6.2)
HCT VFR BLD AUTO: 34.3 % (ref 37–48.5)
HGB BLD-MCNC: 10.5 G/DL (ref 12–16)
IMM GRANULOCYTES # BLD AUTO: 0.03 K/UL (ref 0–0.04)
IMM GRANULOCYTES NFR BLD AUTO: 0.5 % (ref 0–0.5)
LACTATE SERPL-SCNC: 0.8 MMOL/L (ref 0.5–1.9)
LACTATE SERPL-SCNC: 1 MMOL/L (ref 0.5–1.9)
LACTATE SERPL-SCNC: 1.1 MMOL/L (ref 0.5–1.9)
LACTATE SERPL-SCNC: 1.1 MMOL/L (ref 0.5–1.9)
LACTATE SERPL-SCNC: 1.4 MMOL/L (ref 0.5–1.9)
LYMPHOCYTES # BLD AUTO: 1.2 K/UL (ref 1–4.8)
LYMPHOCYTES NFR BLD: 21.9 % (ref 18–48)
MCH RBC QN AUTO: 25.5 PG (ref 27–31)
MCHC RBC AUTO-ENTMCNC: 30.6 G/DL (ref 32–36)
MCV RBC AUTO: 84 FL (ref 82–98)
MONOCYTES # BLD AUTO: 0.9 K/UL (ref 0.3–1)
MONOCYTES NFR BLD: 15.5 % (ref 4–15)
NEUTROPHILS # BLD AUTO: 3.3 K/UL (ref 1.8–7.7)
NEUTROPHILS NFR BLD: 60.8 % (ref 38–73)
NRBC BLD-RTO: 1 /100 WBC
PLATELET # BLD AUTO: 136 K/UL (ref 150–350)
PMV BLD AUTO: 9.8 FL (ref 9.2–12.9)
POTASSIUM SERPL-SCNC: 3.2 MMOL/L (ref 3.5–5.1)
PROT SERPL-MCNC: 6.4 G/DL (ref 6–8.4)
RBC # BLD AUTO: 4.11 M/UL (ref 4–5.4)
SODIUM SERPL-SCNC: 135 MMOL/L (ref 136–145)
TROPONIN I SERPL DL<=0.01 NG/ML-MCNC: 0.1 NG/ML (ref 0.02–0.04)
TROPONIN I SERPL DL<=0.01 NG/ML-MCNC: 0.14 NG/ML (ref 0.02–0.5)
VANCOMYCIN SERPL-MCNC: 10.7 UG/ML
WBC # BLD AUTO: 5.49 K/UL (ref 3.9–12.7)

## 2019-08-07 PROCEDURE — 21000000 HC CCU ICU ROOM CHARGE

## 2019-08-07 PROCEDURE — 25000003 PHARM REV CODE 250: Performed by: INTERNAL MEDICINE

## 2019-08-07 PROCEDURE — 95819 EEG AWAKE AND ASLEEP: CPT

## 2019-08-07 PROCEDURE — 83036 HEMOGLOBIN GLYCOSYLATED A1C: CPT

## 2019-08-07 PROCEDURE — 97116 GAIT TRAINING THERAPY: CPT

## 2019-08-07 PROCEDURE — 80053 COMPREHEN METABOLIC PANEL: CPT

## 2019-08-07 PROCEDURE — 36415 COLL VENOUS BLD VENIPUNCTURE: CPT

## 2019-08-07 PROCEDURE — 25000003 PHARM REV CODE 250: Performed by: NURSE PRACTITIONER

## 2019-08-07 PROCEDURE — 27000221 HC OXYGEN, UP TO 24 HOURS

## 2019-08-07 PROCEDURE — 80202 ASSAY OF VANCOMYCIN: CPT

## 2019-08-07 PROCEDURE — 63600175 PHARM REV CODE 636 W HCPCS: Performed by: NURSE PRACTITIONER

## 2019-08-07 PROCEDURE — 94761 N-INVAS EAR/PLS OXIMETRY MLT: CPT

## 2019-08-07 PROCEDURE — 90935 HEMODIALYSIS ONE EVALUATION: CPT

## 2019-08-07 PROCEDURE — 82962 GLUCOSE BLOOD TEST: CPT

## 2019-08-07 PROCEDURE — 85025 COMPLETE CBC W/AUTO DIFF WBC: CPT

## 2019-08-07 PROCEDURE — 97162 PT EVAL MOD COMPLEX 30 MIN: CPT

## 2019-08-07 PROCEDURE — 83605 ASSAY OF LACTIC ACID: CPT

## 2019-08-07 PROCEDURE — 84484 ASSAY OF TROPONIN QUANT: CPT

## 2019-08-07 PROCEDURE — 83605 ASSAY OF LACTIC ACID: CPT | Mod: 91

## 2019-08-07 RX ORDER — MUPIROCIN 20 MG/G
OINTMENT TOPICAL 2 TIMES DAILY
Status: DISCONTINUED | OUTPATIENT
Start: 2019-08-07 | End: 2019-08-12

## 2019-08-07 RX ORDER — ISOSORBIDE MONONITRATE 30 MG/1
30 TABLET, EXTENDED RELEASE ORAL DAILY
Status: DISCONTINUED | OUTPATIENT
Start: 2019-08-08 | End: 2019-08-12

## 2019-08-07 RX ORDER — SODIUM CHLORIDE 9 MG/ML
INJECTION, SOLUTION INTRAVENOUS ONCE
Status: DISCONTINUED | OUTPATIENT
Start: 2019-08-07 | End: 2019-08-12

## 2019-08-07 RX ORDER — LACOSAMIDE 50 MG/1
50 TABLET ORAL DAILY
Status: DISCONTINUED | OUTPATIENT
Start: 2019-08-08 | End: 2019-08-11

## 2019-08-07 RX ORDER — VANCOMYCIN HCL IN 5 % DEXTROSE 1G/250ML
1000 PLASTIC BAG, INJECTION (ML) INTRAVENOUS ONCE
Status: COMPLETED | OUTPATIENT
Start: 2019-08-08 | End: 2019-08-08

## 2019-08-07 RX ORDER — DILTIAZEM HYDROCHLORIDE 180 MG/1
180 CAPSULE, COATED, EXTENDED RELEASE ORAL DAILY
Status: DISCONTINUED | OUTPATIENT
Start: 2019-08-07 | End: 2019-08-12

## 2019-08-07 RX ORDER — METOPROLOL SUCCINATE 50 MG/1
50 TABLET, EXTENDED RELEASE ORAL DAILY
Status: DISCONTINUED | OUTPATIENT
Start: 2019-08-08 | End: 2019-08-12

## 2019-08-07 RX ORDER — SODIUM CHLORIDE 9 MG/ML
INJECTION, SOLUTION INTRAVENOUS
Status: DISCONTINUED | OUTPATIENT
Start: 2019-08-07 | End: 2019-08-19 | Stop reason: HOSPADM

## 2019-08-07 RX ORDER — SODIUM THIOSULFATE 250 MG/ML
12.5 INJECTION, SOLUTION INTRAVENOUS ONCE
Status: COMPLETED | OUTPATIENT
Start: 2019-08-07 | End: 2019-08-07

## 2019-08-07 RX ADMIN — LACOSAMIDE 50 MG: 50 TABLET, FILM COATED ORAL at 10:08

## 2019-08-07 RX ADMIN — DILTIAZEM HYDROCHLORIDE 180 MG: 180 CAPSULE, COATED, EXTENDED RELEASE ORAL at 01:08

## 2019-08-07 RX ADMIN — ASPIRIN 81 MG: 81 TABLET, COATED ORAL at 10:08

## 2019-08-07 RX ADMIN — LEVETIRACETAM 500 MG: 500 TABLET, FILM COATED ORAL at 10:08

## 2019-08-07 RX ADMIN — HEPARIN SODIUM 5000 UNITS: 5000 INJECTION INTRAVENOUS; SUBCUTANEOUS at 01:08

## 2019-08-07 RX ADMIN — HEPARIN SODIUM 5000 UNITS: 5000 INJECTION INTRAVENOUS; SUBCUTANEOUS at 10:08

## 2019-08-07 RX ADMIN — METOPROLOL SUCCINATE 25 MG: 25 TABLET, FILM COATED, EXTENDED RELEASE ORAL at 10:08

## 2019-08-07 RX ADMIN — DILTIAZEM HYDROCHLORIDE 10 MG/HR: 5 INJECTION INTRAVENOUS at 07:08

## 2019-08-07 RX ADMIN — ISOSORBIDE MONONITRATE 30 MG: 30 TABLET, EXTENDED RELEASE ORAL at 10:08

## 2019-08-07 RX ADMIN — MUPIROCIN: 20 OINTMENT TOPICAL at 08:08

## 2019-08-07 RX ADMIN — HEPARIN SODIUM 5000 UNITS: 5000 INJECTION INTRAVENOUS; SUBCUTANEOUS at 05:08

## 2019-08-07 RX ADMIN — SODIUM THIOSULFATE 12.5 G: 250 INJECTION, SOLUTION INTRAVENOUS at 04:08

## 2019-08-07 RX ADMIN — CEFTRIAXONE SODIUM 2 G: 1 INJECTION, POWDER, FOR SOLUTION INTRAMUSCULAR; INTRAVENOUS at 07:08

## 2019-08-07 NOTE — PROCEDURES
Routine EEG Report     Patient name: Griselda Neely     47    Date of Service: 19    Duration: 20 minutes    Requested By: Kayla Luna NP                                                                                   Reading Physician: Chente Ledezma M.D.        Reason for Study: Seizure.         Clinical History: 73 year old woman with a history of possible seizure activity who presented with transient garbled speech.        AED/sedation: keppra, vimpat        Exam Notes:  The recording was performed with the standard 10-20 electrode placement with additional ECG electrodes. During the recording there were periodic reviews of real-time data to monitor the exam integrity.     Summary:    There is a posterior dominant rhythm of 7-8 Hz, which attenuates during drowsiness. Stage II sleep structures are not seen.    Photic stimulation is performed with no abnormal reactivity noted. Hyperventilation is not performed.    No epileptiform discharges or seizures are captured.        Impression:      This is an abnormal awake and drowsy routine EEG due to diffuse slowing of the background activity consistent with a mild to moderate encephalopathy.

## 2019-08-07 NOTE — PLAN OF CARE
08/07/19 1003   Discharge Assessment   Assessment Type Discharge Planning Assessment   Confirmed/corrected address and phone number on facesheet? Yes   Assessment information obtained from? Caregiver;Other  (Son, Jovany Neely)   Prior to hospitilization cognitive status: Alert/Oriented   Prior to hospitalization functional status: Needs Assistance   Current cognitive status: Alert/Oriented   Current Functional Status: Needs Assistance   Facility Arrived From: [facility]   Lives With sibling(s)   Able to Return to Prior Arrangements no  (SNF ends tomorrow; will go home with sister an son)   Is patient able to care for self after discharge? No   Who are your caregiver(s) and their phone number(s)? Aide Gerberd [phone], Jovany Neely    Patient's perception of discharge disposition home or selfcare   Readmission Within the Last 30 Days   (Family states that she was sent to [facility] from [facility] about 19 days ago)   Patient currently being followed by outpatient case management? Unable to determine (comments)   Equipment Currently Used at Home hospital bed;oxygen;walker, rolling;wheelchair;bedside commode  (rollator)   Do you have any problems affording any of your prescribed medications? No   Is the patient taking medications as prescribed? yes   Does the patient have transportation home? Yes   Transportation Anticipated family or friend will provide   Dialysis Name and Scheduled days [facility] [address]   Does the patient receive services at the [facility]madin Clinic? No   Discharge Plan A Home with family   DME Needed Upon Discharge  none   Patient/Family in Agreement with Plan yes

## 2019-08-07 NOTE — PLAN OF CARE
Problem: Adult Inpatient Plan of Care  Goal: Plan of Care Review  Outcome: Ongoing (interventions implemented as appropriate)     08/07/19 7701   Plan of Care Review   Progress improving   Outcome Summary HEMODYALSIS TODAY, GLUCOSE AC AND HS MONITORED VITAL SIGNS STABLE, AAOX4. CARDIZEM PO STARTED TODAY, CARDIZIM INFUSION TITRATED OFF. CONITUNE MONITORING TELEMETRY SAFETY MAINTAINED. ENCOURAGING PATIENT TO INCREASE PO INTAKE.

## 2019-08-07 NOTE — NURSING
POTASSIUM LEVEL 3.2 ON THIS AM LABS,  DR SUMMERS OVERNIGHT HOSPITALIST ADVISED    NO NEW ORDERS AT THIS TIME, R/T PT NOT TAKING ORAL MEDS AND HD SCHEDULED TODAY AS WELL.

## 2019-08-07 NOTE — PT/OT/SLP EVAL
Physical Therapy Evaluation    Patient Name:  Griselda Neely   MRN:  [id number]    Recommendations:     Discharge Recommendations:  home health PT   Discharge Equipment Recommendations: none   Barriers to discharge: None    Assessment:     Griselda Neely is a 73 y.o. female admitted with a medical diagnosis of Encephalopathy, toxic.  She presents with the following impairments/functional limitations:  weakness, impaired functional mobilty, impaired endurance, gait instability. Family reports pt is almost back to baseline in regards to mentation.     Rehab Prognosis: Fair; patient would benefit from acute skilled PT services to address these deficits and reach maximum level of function.    Recent Surgery: * No surgery found *      Plan:     During this hospitalization, patient to be seen 6 x/week to address the identified rehab impairments via gait training, therapeutic activities, therapeutic exercises and progress toward the following goals:    · Plan of Care Expires:  09/07/19    Subjective     Chief Complaint: AMS  Patient/Family Comments/goals: return home with sister and HH PT  Pain/Comfort:  · Pain Rating 1: 0/10    Patients cultural, spiritual, Mu-ism conflicts given the current situation:      Living Environment:  Pt lives with sister in a 1 story house with ramp  Prior to admission, patients level of function was supervision with mobility within the home.  Equipment used at home: walker, rolling, rollator.  DME owned (not currently used): wheelchair.  Upon discharge, patient will have assistance from sister.    Objective:     Communicated with nurse prior to session.  Patient found supine with blood pressure cuff  upon PT entry to room.    General Precautions: Standard, fall   Orthopedic Precautions:    Braces:       Exams:  · Cognitive Exam:  Patient is oriented to Person and Place  · RUE Strength: WFL  · LUE Strength: WFL  · RLE Strength: WFL  · LLE Strength: WFL    Functional Mobility:  · Bed  Mobility:     · Supine to Sit: contact guard assistance  · Transfers:     · Sit to Stand:  minimum assistance with no AD  · Gait: 12 ft RW Yaya with short step length and step to gait pattern  · Balance: fair dynamic standing balance      Therapeutic Activities and Exercises:       AM-PAC 6 CLICK MOBILITY  Total Score:16     Patient left supine with call button in reach.    GOALS:   Multidisciplinary Problems     Physical Therapy Goals        Problem: Physical Therapy Goal    Goal Priority Disciplines Outcome Goal Variances Interventions   Physical Therapy Goal     PT, PT/OT      Description:  Goals to be met by: D/C    Patient will increase functional independence with mobility by performin. Supine to sit with Stand-by Assistance  2. Sit to stand transfer with Contact Guard Assistance  3. Gait  x  feet with Contact Guard Assistance using Rolling Walker.                       History:     Past Medical History:   Diagnosis Date    Anemia     Calciphylaxis 2017    both legs    CHF (congestive heart failure)     Encounter for blood transfusion 2016    Gout     Hypertension     Mitral valve regurgitation     Osteoarthritis     Pancreatitis     Peripheral vascular disease     Peritoneal dialysis catheter in place     Pneumonia 2017    Renal failure        Past Surgical History:   Procedure Laterality Date    ACHILLES TENDON SURGERY Right     APPENDECTOMY      CARDIAC CATHETERIZATION  2017    CHOLECYSTECTOMY      heal surgery Right     HYSTERECTOMY      INSERTION-PERITONEAL DIALYSIS CATHETER-LAPAROSCOPIC N/A 2016    [facility] at [facility] OR    PARATHYROIDECTOMY      PARATHYROIDECTOMY  2017    PARATHYROIDECTOMY N/A 2017    [facility] at [facility] OR    PERITONEAL CATHETER INSERTION      REIMPLANTATION-PARATHYROID TISSUE N/A 2017    [facility] at [facility] OR     REMOVAL-CATHETER-DIALYSIS-PERITONEAL N/A 9/19/2017    [facility] OR    RENAL BIOPSY      vocal cord nodule         Time Tracking:     PT Received On: 08/07/19  PT Start Time: 1105     PT Stop Time: 1125  PT Total Time (min): 20 min     Billable Minutes: Evaluation 10 min and Gait Training 10 min      Caroline Gilliland, PT  08/07/2019

## 2019-08-07 NOTE — CONSULTS
[facility]  Neurology  Consult Note    Patient Name: Griselda Neely  MRN: [id number]  Admission Date: 8/6/2019  Hospital Length of Stay: 1 days  Code Status: Full Code   Attending Provider: Chalino Victoria MD   Consulting Provider: TODD Veliz  Primary Care Physician: Kalie Neely MD  Principal Problem:Encephalopathy, toxic    Consults  Subjective:     Chief Complaint:  AMS    HPI: rGiselda Neely is a 73 y.o. female with a history as below who presented to the ED from her cardiologist office via  for evaluation of altered mental status changes.  Sister at bedside reports patient has been in [facility] s/p discharge from hospital ~ 3 weeks ago due to encephalopathy. Pt was started on Keppra and Vimpat during that admission. Sister at bedside further states patient stopped eating and drinking on Friday. Further reports patient with ESRD, had dialysis on Wednesday and Friday but was taken off early. Also reports went to HD on Monday.  Per chart review, patient hospitalized In July 2019 and developed acute encephalopathy during that admission, neurology was consulted with extensive work-up and non-acute findings. On admit, labs and imaging reviewed.  Neurology consulted with recs for MRI/MRA and EEG.         Interval History: Patient seen and examined by neurology team with family at bedside. They state that she since Friday, she has had altered mental status involving inability to get her words out correctly. They also state that they had stopped eating/drinking and that she had experienced hypoglycemia as well. Currently she is back to neurological baseline. They deny any evidence of clear seizure activity.    CRT: 7.0  K: 3.2  LDL: 55.2  A1c: pending     Brain imaging:  CT head:   No acute intracranial abnormality.    Neck Imaging: pending     Cardiac imaging: pending     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    both legs    CHF  (congestive heart failure)     Encounter for blood transfusion 03/2016    Gout     Hypertension     Mitral valve regurgitation     Osteoarthritis     Pancreatitis     Peripheral vascular disease     Peritoneal dialysis catheter in place     Pneumonia 09/09/2017    Renal failure        Past Surgical History:   Procedure Laterality Date    ACHILLES TENDON SURGERY Right     APPENDECTOMY      CARDIAC CATHETERIZATION  07/03/2017    CHOLECYSTECTOMY      heal surgery Right     HYSTERECTOMY      INSERTION-PERITONEAL DIALYSIS CATHETER-LAPAROSCOPIC N/A 4/26/2016    [facility]    PARATHYROIDECTOMY      PARATHYROIDECTOMY  07/13/2017    PARATHYROIDECTOMY N/A 7/13/2017    [facility] OR    PERITONEAL CATHETER INSERTION      REIMPLANTATION-PARATHYROID TISSUE N/A 7/13/2017    [facility]    REMOVAL-CATHETER-DIALYSIS-PERITONEAL N/A 9/19/2017    [facility] OR    RENAL BIOPSY      vocal cord nodule         Review of patient's allergies indicates:  No Known Allergies    Current Neurological Medications: MAR reviewed     No current facility-administered medications on file prior to encounter.      Current Outpatient Medications on File Prior to Encounter   Medication Sig    aspirin (ECOTRIN) 81 MG EC tablet Take 81 mg by mouth once daily.    clindamycin (CLEOCIN) 300 MG capsule Take 300 mg by mouth 3 (three) times daily.    diltiaZEM (CARDIZEM) 120 MG tablet Take 120 mg by mouth 4 (four) times daily.    fluticasone furoate-vilanterol (BREO) 100-25 mcg/dose diskus inhaler Inhale 1 puff into the lungs once daily. Controller    HYDROcodone-acetaminophen (NORCO) 5-325 mg per tablet Take 1 tablet by mouth every 6 (six) hours as needed for Pain.    isosorbide mononitrate (IMDUR) 30 MG 24 hr tablet Take 30 mg by mouth once daily.    lacosamide (VIMPAT) 50 mg Tab Take by  mouth 2 (two) times daily.    levETIRAcetam (KEPPRA) 250 MG Tab Take 250 mg by mouth every Mon, Wed, Fri. Take 1 tablet MWF after dialysis.    levETIRAcetam (KEPPRA) 500 MG Tab Take 500 mg by mouth once daily.    losartan (COZAAR) 25 MG tablet Take 25 mg by mouth once daily.    metoprolol succinate (TOPROL-XL) 25 MG 24 hr tablet Take 25 mg by mouth once daily.    sevelamer carbonate (RENVELA) 800 mg Tab Take 800 mg by mouth 3 (three) times daily with meals.    traMADol (ULTRAM) 50 mg tablet Take 50 mg by mouth every 6 (six) hours as needed for Pain.    acetaminophen (TYLENOL) 325 MG tablet Take 650 mg by mouth every 4 (four) hours as needed for Pain.    albuterol-ipratropium 2.5mg-0.5mg/3mL (DUO-NEB) 0.5 mg-3 mg(2.5 mg base)/3 mL nebulizer solution Take 3 mLs by nebulization every 6 (six) hours as needed for Wheezing. Rescue    budesonide-formoterol 160-4.5 mcg (SYMBICORT) 160-4.5 mcg/actuation HFAA Inhale 2 puffs into the lungs every 12 (twelve) hours. Controller    nitroGLYCERIN (NITROSTAT) 0.3 MG SL tablet Place 0.3 mg under the tongue every 5 (five) minutes as needed for Chest pain.    ondansetron (ZOFRAN) 4 MG tablet Take 4 mg by mouth every 6 (six) hours as needed for Nausea.      Family History     Problem Relation (Age of Onset)    Arthritis Mother    Diabetes Mother, Father    Early death Sister, Sister    Heart disease Sister, Maternal Grandfather, Mother    Heart failure Mother    Hypertension Mother        Tobacco Use    Smoking status: Current Some Day Smoker     Packs/day: 0.50     Years: 45.00     Pack years: 22.50    Smokeless tobacco: Never Used   Substance and Sexual Activity    Alcohol use: No    Drug use: No    Sexual activity: Not on file     Review of Systems   Constitutional: Negative.    HENT: Negative.    Eyes: Negative.    Respiratory: Negative.    Cardiovascular: Negative.    Gastrointestinal: Negative.    Endocrine: Negative.    Genitourinary: Negative.    Musculoskeletal:  Negative.    Skin: Negative.    Allergic/Immunologic: Negative.    Neurological: Positive for speech difficulty.   Hematological: Negative.    Psychiatric/Behavioral: Negative.      Objective:     Vital Signs (Most Recent):  Temp: 97.3 °F (36.3 °C) (08/07/19 0731)  Pulse: 86 (08/07/19 0739)  Resp: (!) 62 (08/07/19 0739)  BP: 119/85 (08/07/19 0731)  SpO2: 100 % (08/07/19 0739) Vital Signs (24h Range):  Temp:  [97.3 °F (36.3 °C)-99.4 °F (37.4 °C)] 97.3 °F (36.3 °C)  Pulse:  [] 86  Resp:  [16-62] 62  SpO2:  [96 %-100 %] 100 %  BP: (116-159)/() 119/85     Weight: 58 kg (127 lb 13.9 oz)  Body mass index is 21.28 kg/m².    Physical Exam   Constitutional: She appears well-developed and well-nourished.   HENT:   Head: Normocephalic and atraumatic.   Eyes: Pupils are equal, round, and reactive to light. EOM are normal.   Neck: Normal range of motion.   Cardiovascular: Normal rate and regular rhythm.   Pulmonary/Chest: Effort normal and breath sounds normal.   Abdominal: Soft. Bowel sounds are normal.   Musculoskeletal: Normal range of motion.   Neurological: She is alert. She has normal strength.   Skin: Skin is warm and dry.   Psychiatric: She has a normal mood and affect. Her speech is normal and behavior is normal. Thought content normal.       NEUROLOGICAL EXAMINATION:     MENTAL STATUS   Oriented to person.   Oriented to place.   Disoriented to time.   Speech: speech is normal   Level of consciousness: alert ,  responsive to painful stimuli    CRANIAL NERVES     CN III, IV, VI   Pupils are equal, round, and reactive to light.  Extraocular motions are normal.     MOTOR EXAM     Strength   Strength 5/5 throughout.       Significant Labs: All pertinent lab results from the past 24 hours have been reviewed.    Significant Imaging: I have reviewed and interpreted all pertinent imaging results/findings within the past 24 hours.    Assessment and Plan:   Encephalopathy  -Likely metabolic, multifactorial including  dehydration, infection, and hypokalemia  -Back to baseline  -Obtain 30 minute EEG today  -Check B12, TSH, Folate, Ammonia  -Previous MRI brain done July 2019 negative acute, shows remote ischemia     History of seizures  -Abnormal EEGs on previous admit and pt started on Vimpat and Keppra  -Repeat EEG 30 minutes today  -No clear evidence of further seizures at this time   -Continue Current dose of Keppra and Vimpat for now  -Check AED levels    ESRD  -Nephrology following     Pneumonia   -Management per IM    Seizure education.    No driving for now, no swimming alone, no climbing high areas, no operation of heavy machinery or working with high risk electricity equipment.  Continue to take AEDs as prescribed. If any major side effects from neurological medications go to the ED including mood changes and severe depression.  Patient and/or next of kin informed.  Follow up Neurology in 2 weeks at [phone].  Medication side effects discussed with the patient and/or caregiver.      Active Diagnoses:    Diagnosis Date Noted POA    PRINCIPAL PROBLEM:  Encephalopathy, metabolic  [G92] 08/06/2019 Yes    Disorientation [R41.0] 08/06/2019 Yes    Encephalomalacia on imaging study [G93.89] 08/06/2019 Yes    Hypoglycemia [E16.2] 08/06/2019 Yes    Elevated lactic acid level [R79.89] 08/06/2019 Yes    Altered mental status [R41.82] 08/06/2019 Yes    Pneumonia due to infectious organism [J18.9] 08/06/2019 Yes    Atrial fibrillation with RVR [I48.91] 07/18/2017 Yes    ESRD (end stage renal disease) [N18.6] 05/11/2016 Yes      Problems Resolved During this Admission:       VTE Risk Mitigation (From admission, onward)        Ordered     heparin (porcine) injection 5,000 Units  Every 8 hours      08/06/19 2001     Place ROSANNA hose  Until discontinued      08/06/19 2001     IP VTE HIGH RISK PATIENT  Once      08/06/19 2001          Thank you for your consult. I will follow-up with patient. Please contact us if you have any  additional questions.    TODD Veliz  Neurology  Formerly [facility], 1928–1983

## 2019-08-07 NOTE — CONSULTS
"[facility]  Nephrology  Consult Note    Patient Name: Griselda Neely  MRN: [id number]  Admission Date: 8/6/2019  Hospital Length of Stay: 1 days  Attending Provider: Chalino Victoria MD   Primary Care Physician: Kalie Neely MD  Principal Problem:Encephalopathy, toxic    Inpatient consult to Nephrology  Consult performed by: Dane Martinez MD  Consult ordered by: Basil Velasquez, DO      : hx ESRD (HD-MWF via L UE AV Fistula); requests maintenance RRT    Subjective:     HPI:   72 y/o F w/ "multiple medical problems" including ESRD (HD-MWD via L UE AV Fistula), HTN, HFrEF, Valvular heart disease (s/p MVR), Gouty Arthropathy, Pancreatitis, PVD (w/ B LE calciphylaxis) & Tobacco Use d/o admitted from [facility] 2/2 altered mental status of unclear etiology; formal Nephrology service consultation is requested to provide inpatient maintenance RRT    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    both legs    CHF (congestive heart failure)     Encounter for blood transfusion 03/2016    Gout     Hypertension     Mitral valve regurgitation     Osteoarthritis     Pancreatitis     Peripheral vascular disease     Peritoneal dialysis catheter in place     Pneumonia 09/09/2017    Renal failure        Past Surgical History:   Procedure Laterality Date    ACHILLES TENDON SURGERY Right     APPENDECTOMY      CARDIAC CATHETERIZATION  07/03/2017    CHOLECYSTECTOMY      heal surgery Right     HYSTERECTOMY      INSERTION-PERITONEAL DIALYSIS CATHETER-LAPAROSCOPIC N/A 4/26/2016    [facility]    PARATHYROIDECTOMY      PARATHYROIDECTOMY  07/13/2017    PARATHYROIDECTOMY N/A 7/13/2017    [facility]    PERITONEAL CATHETER INSERTION      REIMPLANTATION-PARATHYROID TISSUE N/A 7/13/2017    [facility]    REMOVAL-CATHETER-DIALYSIS-PERITONEAL N/A 9/19/2017    " [facility]    RENAL BIOPSY      vocal cord nodule         Review of patient's allergies indicates:  No Known Allergies  Current Facility-Administered Medications   Medication Frequency    aspirin EC tablet 81 mg Daily    cefTRIAXone (ROCEPHIN) 2 g in dextrose 5 % 50 mL IVPB Q24H    dextrose 50% injection 12.5 g PRN    diltiaZEM 125 mg in dextrose 5% 125 mL infusion (non-titrating) Continuous    glucagon (human recombinant) injection 1 mg PRN    heparin (porcine) injection 5,000 Units Q8H    insulin aspart U-100 pen 0-5 Units Q6H PRN    isosorbide mononitrate 24 hr tablet 30 mg Daily    lacosamide tablet 50 mg Q12H    levETIRAcetam tablet 500 mg Daily    metoprolol succinate (TOPROL-XL) 24 hr tablet 25 mg Daily    sodium chloride 0.9% flush 10 mL PRN     Family History     Problem Relation (Age of Onset)    Arthritis Mother    Diabetes Mother, Father    Early death Sister, Sister    Heart disease Sister, Maternal Grandfather, Mother    Heart failure Mother    Hypertension Mother        Tobacco Use    Smoking status: Current Some Day Smoker     Packs/day: 0.50     Years: 45.00     Pack years: 22.50    Smokeless tobacco: Never Used   Substance and Sexual Activity    Alcohol use: No    Drug use: No    Sexual activity: Not on file     Review of Systems   Unable to perform ROS: Mental status change     Objective:     Vital Signs (Most Recent):  Temp: 97.4 °F (36.3 °C) (08/07/19 0357)  Pulse: 104 (08/07/19 0500)  Resp: (!) 31 (08/07/19 0500)  BP: 131/65 (08/07/19 0400)  SpO2: 98 % (08/07/19 0500)  O2 Device (Oxygen Therapy): nasal cannula (08/07/19 0357) Vital Signs (24h Range):  Temp:  [97.4 °F (36.3 °C)-99.4 °F (37.4 °C)] 97.4 °F (36.3 °C)  Pulse:  [] 104  Resp:  [16-37] 31  SpO2:  [96 %-100 %] 98 %  BP: (116-159)/() 131/65     Weight: 58 kg (127 lb 13.9 oz) (08/07/19 0110)  Body mass index is 21.28 kg/m².  Body surface area is 1.63 meters  squared.    I/O last 3 completed shifts:  In: 2374 [I.V.:500; IV Piggyback:1874]  Out: -     Physical Exam   Constitutional: She is oriented to person, place, and time. She appears well-developed. No distress.   Malnourished, chronically ill-appearing   HENT:   Head: Normocephalic and atraumatic.   Mouth/Throat: Oropharynx is clear and moist.   Eyes: Right eye exhibits no discharge. Left eye exhibits no discharge. No scleral icterus.   B conjunctival injection   Neck: Normal range of motion. Neck supple. No JVD present.   Cardiovascular: Normal rate and intact distal pulses. Exam reveals no gallop and no friction rub.   Murmur heard.  Irregularly irregular rhythm   Pulmonary/Chest: Effort normal and breath sounds normal. No respiratory distress. She has no rales.   Abdominal: Soft. She exhibits no distension and no mass.   Hypoactive BS in all quadrants   Genitourinary:   Genitourinary Comments: Deferred   Musculoskeletal: Normal range of motion. She exhibits no edema, tenderness or deformity.   R UE AV fistula w/ palpable thrill   Neurological: She is alert and oriented to person, place, and time. No cranial nerve deficit.   Skin: Skin is warm and dry. She is not diaphoretic.   B LE Ulcer (Calciphylaxis)   Psychiatric: She has a normal mood and affect. Her behavior is normal.   Nursing note and vitals reviewed.      Significant Labs:  CBC:   Recent Labs   Lab 08/07/19 0216   WBC 5.49   RBC 4.11   HGB 10.5*   HCT 34.3*   *   MCV 84   MCH 25.5*   MCHC 30.6*     CMP:   Recent Labs   Lab 08/07/19 0216   GLU 79   CALCIUM 8.8   ALBUMIN 2.9*   PROT 6.4   *   K 3.2*   CO2 25   CL 95   BUN 19   CREATININE 7.0*   ALKPHOS 49*   ALT 6*   AST 17   BILITOT 0.8       Significant Imaging:  X-Ray: Reviewed    Assessment/Plan:     Active Diagnoses:    Diagnosis Date Noted POA    PRINCIPAL PROBLEM:  Encephalopathy, metabolic  [G92] 08/06/2019 Yes    Disorientation [R41.0] 08/06/2019 Yes    Encephalomalacia on  imaging study [G93.89] 08/06/2019 Yes    Hypoglycemia [E16.2] 08/06/2019 Yes    Elevated lactic acid level [R79.89] 08/06/2019 Yes    Altered mental status [R41.82] 08/06/2019 Yes    Pneumonia due to infectious organism [J18.9] 08/06/2019 Yes    Atrial fibrillation with RVR [I48.91] 07/18/2017 Yes    ESRD (end stage renal disease) [N18.6] 05/11/2016 Yes      Problems Resolved During this Admission:     1 ESRD (HD-MWF via R UE AV Fistula)- clinically stable at present; no overt volume overload, uremic signs/symptoms, electrolyte perturbations nor acid-base disturbance; no active issues    -maintenance HD (duration: 3h; UF Goal: 2-3L as tolerated; 3K Electrolyte Standard 'bath'; Access: AVF; Qb: 400 ml/min, Qd: 2x BFR; 12.5 gm of Na thiosulfate over last 30-60 min of treatment))    -DAILY renal function panel to document sCr trend, optimize electrolyte 'bath' & assess response to therapy    -avoid nephrotoxic agents (NSAIDs, IV contrast dye, MRI w/ Gadolinium constrast)    -renally dose all appropriate medications, including antibiotics    2. HTN- clinically stable at present; BP AT GOAL (131/65); no active issues    -continue current anti-HTN drug regimen (GIVE BP MEDS POST HD on HD Days) +/- HD-associated UF    3. ANEMIA- clinically stable at present; H/H AT GOAL (10.5/34.3); no active issues    -trend DAILY CBC (H/H) to effect optimal blood-loss surveillance    4. SECONDARY HYPERPARATHYROIDISM (sHPT)-- clinically stable on current therapy (Na thiosulfate 12.5 gm x last 30-60 min of HD); no active issues    -continue dietary binder/Vitamin D +/- HD-associated calcimimetic therapy (Cinacalcet vs Etelcalcetide)    5. ALTERED MENTAL STATUS- clinically stable at present; etiology unclear    -defer workup/management to Primary Service (Hospital Medicine)    Thank you for your consult. I will follow-up with patient. Please contact us if you have any additional questions.    Dane Martinez III, MD  [facility]  [facility]  [phone] (C)

## 2019-08-07 NOTE — CONSULTS
"[facility]  Cardiology  Consult Note    Patient Name: Griselda Neely  MRN: [id number]  Admission Date: 8/6/2019  Hospital Length of Stay: 1 days  Code Status: Full Code   Attending Provider: Chalino Victoria MD   Consulting Provider: Ramila Bloom NP  Primary Care Physician: Kalie Neely MD  Principal Problem:Encephalopathy, toxic    Patient information was obtained from patient, relative(s), past medical records and ER records.     Inpatient consult to Cardiology  Consult performed by: Ramila Bloom NP  Consult ordered by: TODD Rush        Subjective:     REASON FOR CONSULT:  Chest pain    HPI:     Ms. Neely is a 73 year old female with multiple medical problems including COPD with home O2 dependence, MVR, hyperlipidemia, anemia, HTN, heart failure with reduced LV systolic function, pulmonary HTN, and ESRD requiring dialysis on MWF, secondary hyperparathyroidism, and calciphylaxis.  She did have a recent hospitalization last month for CHF exacerbation complicated by confusion with encephalopathy.   She presented to our clinic yesterday for routine follow up at which time she was found to be very lethargic with altered mental status. She also seemed to have a tremor/twitch on the right hand at that time. Reportedly she had been having some "shaky movements" at home also. Family reported that she had not eaten or taken her medication for the last 3 days. Per the family, she was in her normal state of health of on Thursday, and on Friday, these changes were noted. She was sent to the ER for further neurological evaluation at that time. Upon workup, her glucose was found to be low in the 50s. Head CT was negative for any stroke. She was in atrial fibrillation with rapid ventricular response in the 130s. She was placed on Cardizem drip which has controlled the rate.    Upon exam today, she is back to her neurologic baseline. The patient denies chest pain, shortness of breath, " palpitations, dizziness, lightheadedness, syncopal episods, leg swelling, or any signs and symtpoms suggestive of a stroke. Denies blood from the urine or stool. She does not remember the events of yesterday, but is oriented to the present.       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    both legs    CHF (congestive heart failure)     Encounter for blood transfusion 03/2016    Gout     Hypertension     Mitral valve regurgitation     Osteoarthritis     Pancreatitis     Peripheral vascular disease     Peritoneal dialysis catheter in place     Pneumonia 09/09/2017    Renal failure        Past Surgical History:   Procedure Laterality Date    ACHILLES TENDON SURGERY Right     APPENDECTOMY      CARDIAC CATHETERIZATION  07/03/2017    CHOLECYSTECTOMY      heal surgery Right     HYSTERECTOMY      INSERTION-PERITONEAL DIALYSIS CATHETER-LAPAROSCOPIC N/A 4/26/2016    [facility]    PARATHYROIDECTOMY      PARATHYROIDECTOMY  07/13/2017    PARATHYROIDECTOMY N/A 7/13/2017    Performed by Jhoan Willoughby MD at [facility] OR    PERITONEAL CATHETER INSERTION      REIMPLANTATION-PARATHYROID TISSUE N/A 7/13/2017    Performed by Jhoan Willoughby MD at [facility] OR    REMOVAL-CATHETER-DIALYSIS-PERITONEAL N/A 9/19/2017    Performed by Jhoan Willoughby MD at [facility] OR    RENAL BIOPSY      vocal cord nodule         Review of patient's allergies indicates:  No Known Allergies    No current facility-administered medications on file prior to encounter.      Current Outpatient Medications on File Prior to Encounter   Medication Sig    aspirin (ECOTRIN) 81 MG EC tablet Take 81 mg by mouth once daily.    clindamycin (CLEOCIN) 300 MG capsule Take 300 mg by mouth 3 (three) times daily.    diltiaZEM (CARDIZEM) 120 MG tablet Take 120 mg by mouth 4 (four) times daily.    fluticasone furoate-vilanterol (BREO) 100-25 mcg/dose diskus inhaler Inhale 1  puff into the lungs once daily. Controller    HYDROcodone-acetaminophen (NORCO) 5-325 mg per tablet Take 1 tablet by mouth every 6 (six) hours as needed for Pain.    isosorbide mononitrate (IMDUR) 30 MG 24 hr tablet Take 30 mg by mouth once daily.    lacosamide (VIMPAT) 50 mg Tab Take by mouth 2 (two) times daily.    levETIRAcetam (KEPPRA) 250 MG Tab Take 250 mg by mouth every Mon, Wed, Fri. Take 1 tablet MWF after dialysis.    levETIRAcetam (KEPPRA) 500 MG Tab Take 500 mg by mouth once daily.    losartan (COZAAR) 25 MG tablet Take 25 mg by mouth once daily.    metoprolol succinate (TOPROL-XL) 25 MG 24 hr tablet Take 25 mg by mouth once daily.    sevelamer carbonate (RENVELA) 800 mg Tab Take 800 mg by mouth 3 (three) times daily with meals.    traMADol (ULTRAM) 50 mg tablet Take 50 mg by mouth every 6 (six) hours as needed for Pain.    acetaminophen (TYLENOL) 325 MG tablet Take 650 mg by mouth every 4 (four) hours as needed for Pain.    albuterol-ipratropium 2.5mg-0.5mg/3mL (DUO-NEB) 0.5 mg-3 mg(2.5 mg base)/3 mL nebulizer solution Take 3 mLs by nebulization every 6 (six) hours as needed for Wheezing. Rescue    budesonide-formoterol 160-4.5 mcg (SYMBICORT) 160-4.5 mcg/actuation HFAA Inhale 2 puffs into the lungs every 12 (twelve) hours. Controller    nitroGLYCERIN (NITROSTAT) 0.3 MG SL tablet Place 0.3 mg under the tongue every 5 (five) minutes as needed for Chest pain.    ondansetron (ZOFRAN) 4 MG tablet Take 4 mg by mouth every 6 (six) hours as needed for Nausea.       Scheduled Meds:   aspirin  81 mg Oral Daily    cefTRIAXone (ROCEPHIN) IVPB  2 g Intravenous Q24H    heparin (porcine)  5,000 Units Subcutaneous Q8H    isosorbide mononitrate  30 mg Oral Daily    lacosamide  50 mg Oral Q12H    levETIRAcetam  500 mg Oral Daily    metoprolol succinate  25 mg Oral Daily     Continuous Infusions:   dilTIAZem 10 mg/hr (08/07/19 0706)     PRN Meds:.dextrose 50%, glucagon (human recombinant), insulin  aspart U-100, sodium chloride 0.9%    Family History     Problem Relation (Age of Onset)    Arthritis Mother    Diabetes Mother, Father    Early death Sister, Sister    Heart disease Sister, Maternal Grandfather, Mother    Heart failure Mother    Hypertension Mother          Tobacco Use    Smoking status: Current Some Day Smoker     Packs/day: 0.50     Years: 45.00     Pack years: 22.50    Smokeless tobacco: Never Used   Substance and Sexual Activity    Alcohol use: No    Drug use: No    Sexual activity: Not on file       ROS     Constitutional: negative for fevers, night sweats and sweats  Eyes: negative for color blindness and redness  Ears, nose, mouth, throat, and face: negative for earaches, epistaxis and sore throat  Respiratory: positive for pleurisy/chest pain, negative for cough and hemoptysis  Cardiovascular: negative for chest pain and chest pressure/discomfort, negative for claudication, near-syncope, orthopnea, paroxysmal nocturnal dyspnea and syncope  Gastrointestinal: negative for abdominal pain  Genitourinary:negative for dysuria  Hematologic/lymphatic: negative for easy bruising  Musculoskeletal:negative for muscle weakness  Neurological: negative for gait problems. Positive for Right hand tremor.   Behavioral/Psych: negative for aggressive behavior  Endocrine: negative for temperature intolerance    Cardiology, Afib, rate controlled.     Objective:     Vital Signs (Most Recent):  Temp: 97.3 °F (36.3 °C) (08/07/19 0731)  Pulse: 93 (08/07/19 1025)  Resp: 19(motion artifact detected on rr lead) (08/07/19 0739)  BP: 120/81 (08/07/19 1025)  SpO2: 100 % (08/07/19 0739) Vital Signs (24h Range):  Temp:  [97.3 °F (36.3 °C)-99.4 °F (37.4 °C)] 97.3 °F (36.3 °C)  Pulse:  [] 93  Resp:  [16-37] 19  SpO2:  [96 %-100 %] 100 %  BP: (116-159)/() 120/81     Weight: 58 kg (127 lb 13.9 oz)  Body mass index is 21.28 kg/m².    SpO2: 100 %  O2 Device (Oxygen Therapy): nasal cannula      Intake/Output  Summary (Last 24 hours) at 8/7/2019 1139  Last data filed at 8/6/2019 2315  Gross per 24 hour   Intake 2374 ml   Output --   Net 2374 ml       Lines/Drains/Airways     Central Venous Catheter Line                 Hemodialysis Catheter -- days          Peripheral Intravenous Line                 Peripheral IV - Single Lumen 08/06/19 2056 20 G Left;Posterior Forearm less than 1 day         Peripheral IV - Single Lumen 08/06/19 2115 20 G Posterior;Left Forearm less than 1 day                Physical Exam   General Appearance:    Alert, cooperative, no distress, appears stated age   Head:    Normocephalic, without obvious abnormality, atraumatic   Eyes:    PERRL, conjunctiva/corneas clear, EOM's intact.   Throat:   Lips, mucosa, and tongue normal; teeth and gums normal   Neck:   Supple, symmetrical, trachea midline, no adenopathy;     thyroid:  no enlargement/tenderness/nodules; no carotid    bruit or JVD   Lungs:     Clear to auscultation bilaterally, respirations unlabored   Chest Wall:    No tenderness or deformity    Heart:    Irregular rate and rhythm, S1 and S2 normal, + SM, no rub    or gallop   Abdomen:     Soft, non-tender, bowel sounds active all four quadrants,     no masses, no organomegaly   Extremities:   Extremities normal, atraumatic, no cyanosis or edema. Right upper arm fistula, + thrill. Left thigh calciphylactic ulcer with dressing CDI.    Pulses:   2+ and symmetric radial and dorsalis pedis pulses   Skin:   Skin color, texture, turgor normal, no rashes or lesions   Neurologic:   AAO X 3. Lack of recall of yesterday's events.              Significant Labs:   ABG: No results for input(s): PH, PCO2, HCO3, POCSATURATED, BE in the last 48 hours., Blood Culture:   Recent Labs   Lab 08/06/19  1810 08/06/19  1821   LABBLOO No Growth to date No Growth to date   , BMP:   Recent Labs   Lab 08/06/19  1747 08/07/19  0216   *  154* 79     140 135*   K 3.9  3.9 3.2*   CL 95  95 95   CO2 29  29 25    BUN 20 20 19   CREATININE 7.2*  7.2* 7.0*   CALCIUM 9.7  9.7 8.8   , CMP   Recent Labs   Lab 08/06/19  1747 08/07/19  0216     140 135*   K 3.9  3.9 3.2*   CL 95  95 95   CO2 29  29 25   *  154* 79   BUN 20  20 19   CREATININE 7.2*  7.2* 7.0*   CALCIUM 9.7  9.7 8.8   PROT 7.2  7.2 6.4   ALBUMIN 3.2*  3.2* 2.9*   BILITOT 1.2*  1.2* 0.8   ALKPHOS 54*  54* 49*   AST 18  18 17   ALT 8*  8* 6*   ANIONGAP 16  16 15   ESTGFRAFRICA 6.0*  6.0* 6.2*   EGFRNONAA 5.2*  5.2* 5.4*   , CBC   Recent Labs   Lab 08/06/19 1747 08/07/19  0216   WBC 5.24  5.24 5.49   HGB 11.2*  11.2* 10.5*   HCT 36.0*  36.0* 34.3*   *  138* 136*   , INR   Recent Labs   Lab 08/06/19  1747   INR 1.1  1.1   , Lipid Panel   Recent Labs   Lab 08/06/19  1747   CHOL 148   HDL 69   LDLCALC 55.2*   TRIG 119   CHOLHDL 46.6   , Troponin   Recent Labs   Lab 08/06/19  1747 08/07/19  0216 08/07/19  1054   TROPONINI 0.092 0.136 0.095*    and All pertinent lab results from the last 24 hours have been reviewed.    Significant Imaging: X-Ray: CXR: X-Ray Chest 1 View (CXR): No results found for this visit on 08/06/19.    CLINICAL HISTORY:  CHF;    COMPARISON:  07/10/2019.    FINDINGS:  The heart is enlarged but stable.  Mild central pulmonary vascular prominence remains unchanged.  A prostatic valve is positioned over the cardiac silhouette.  There is arteriosclerotic calcification within the aortic arch.    There are persistent ground-glass type opacities in the right mid and lower lung zone.  There are some elements of platelike atelectasis or scarring in the lower lung zone.  Blunting of the right costophrenic angle persists but has improved slightly in the interval.  The left lung is free of confluent infiltrates or significant effusions.      Impression       Persistence of predominantly ground-glass type pulmonary opacities in the right mid and lower lung zone.  There is some elements of right basilar platelike  atelectasis or scarring.    Persistent small right-sided effusion.    Stable cardiomegaly and additional observations as above.      Electronically signed by: Camacho Arce IV., MD  Date: 08/06/2019  Time: 18:59             Last Resulted: 08/06/19 18:59 Order Details View Encounter Lab and Collection Details Routing Result History            External Result Report     External Result Report   Narrative     EXAMINATION:  XR CHEST AP PORTABLE    CLINICAL HISTORY:  CHF;    COMPARISON:  07/10/2019.    FINDINGS:  The heart is enlarged but stable.  Mild central pulmonary vascular prominence remains unchanged.  A prostatic valve is positioned over the cardiac silhouette.  There is arteriosclerotic calcification within the aortic arch.    There are persistent ground-glass type opacities in the right mid and lower lung zone.  There are some elements of platelike atelectasis or scarring in the lower lung zone.  Blunting of the right costophrenic angle persists but has improved slightly in the interval.  The left lung is free of confluent infiltrates or significant effusions.   Impression       Persistence of predominantly ground-glass type pulmonary opacities in the right mid and lower lung zone.  There is some elements of right basilar platelike atelectasis or scarring.    Persistent small right-sided effusion.    Stable cardiomegaly and additional observations as above.      Electronically signed by: Camacho Arce IV., MD  Date: 08/06/2019  Time: 18:59    Encounter     View En        Impression Head CT:        No acute intracranial abnormality.    Chronic small vessel ischemic changes.       Assessment and Plan:     IMPRESSION:    Altered mental status. Secondary to seizure activity vs hypoglyemia. Improved. Head CT negative. Pending further neuro workup  Congestive heart failure. Acute on chronic. LVEF ~40-45% on last echo 7/2019. Euvolemic overload on exam.   Hypoglycemia. Poor oral intake. Improved.   Calciphlyaxis. Left leg  ulcer. Undergoing wound care treatments.   Atrial fibrillation. Valvular. Persistent. Patient does not wish to be on any oral anticoagulation or to be cardioverted. Cardizem drip currently at 10mg/hr.   s/p MVR via right thoracotomy approach on 3/18/19 with Dr. Lora    Hypokalemia  Mildly elevated troponins in the presence of ESRD and recent PNA (troponin 0.05). Chronic.   CAD. Nonobstructive on LHC done on 3/13/2019.   End-stage renal disease on hemodialysis MWF. Anuric. Followed by Dr. Ingram.   Hypocalemia on calcium and Vit D supplements. Now with secondary hyperparathyroidism and calciphylaxis with lower extremity ulcers.   Hyperlipidemia  Hypertension.  Chronic tobacco abuse.  COPD on home O2 at 2L    RECOMMENDATIONS:    1. Resume home cardiac medications.  2. Wean Cardizem GTT to off after PO meds given.  3. Will follow neurology work-up.   4. Further decisions to be based upon the hospital course.     Thank you for your consult. I will follow-up with patient. Please contact us if you have any additional questions.    Ramila Bloom NP  Cardiology   [facility]      I have personally seen and examined the patient. I reviewed the notes, assessments, and/or procedures performed by Ms Ramila Bloom, I concur with her documentation of Griselda Neely.

## 2019-08-07 NOTE — PT/OT/SLP PROGRESS
Occupational Therapy      Patient Name:  Griselda Neely   MRN:  [id number]    Patient not seen today secondary to Unavailable (Comment)(pt in EEG ). Will follow-up 8/8/2019.    Jany Rivero OT  8/7/2019

## 2019-08-07 NOTE — ASSESSMENT & PLAN NOTE
Likely metabolic. She is afebrile and without leukocytosis  -Continue IV fluids  -Lactic acid levels q4  -blood cultures pending  -IV abx prophylactic until cultures result

## 2019-08-07 NOTE — ASSESSMENT & PLAN NOTE
Patient with PAF on admit telemetry showed a-fib rvr, started on cardizem gtt in ED  -Continuous cardiac monitoring   -Continue cardizem gtt  -Cardiology consult appreciated  -Restart home metoprolol/ASA

## 2019-08-07 NOTE — ASSESSMENT & PLAN NOTE
AMS  - baseline unclear   Multifactorial - ?metabolic +/- uremia +/- infectious process  +/- hypoglycemia +/- medications induced. Patient with recent admission July 2019 and developed acute encephalopathy at that time. Neurology consulted, extensive work-up completed; started on vimpat and keppra.  CT completed and without acute findings. CXR shows persistence of predominantly ground-glass type pulmonary opacities in the right mid and lower lung zone, ?poss contributory to acute encephalitis  -Continue IV abx   -Blood cultures pending  -Neuro checks q2 x 12 hours   -Neurology consulted  -Given extensive neurology work-up will defer further testing to neurology (MRI/MRA and EEG)  -Continue keppra and vimpat  -IV abx prophylactic pending blood culture results

## 2019-08-07 NOTE — HPI
"Griselda Neely is a 73 y.o. female with a history as below who presented to the ED from her cardiologist office via  for evaluation of altered mental status changes.  Sister at bedside reports patient has been in [facility] s/p discharge from hospital ~ 3 weeks ago, diagnosis unclear as family states "I know she was started on anti-seizure medications, Keppra and vimpat. Sister at bedside further states patient stopped eating and drinking on Friday.  Further reports patient with ESRD, had dialysis on Wednesday and Friday but was taken off early. Also reports went to HD on Monday.  Per chart review, patient hospitalized In July 2019 and developed acute encephalopathy during that admission, neurology was consulted with extensive work-up and non-acute findings. On admit, labs and imaging reviewed.  Neurology consulted with recs for MRI/MRA and EEG.  Admitted for acute encephalitis ?metabolic +/- uremia +/- infectious process  +/- hypoglycemia +/- medications.  Nephrology consulted for inpatient HD.         I have been asked to see the pt regarding hypoglycemia which has developed since admit. Pt has required D20 infusion to keep glucoses in nml range. Pt is minimally responsive to me - she moans when asked questions, but is not able to answer any questions.     "

## 2019-08-07 NOTE — H&P
"[facility] Medicine  History & Physical    Patient Name: Griselda Neely  MRN: [id number]  Admission Date: 8/6/2019  Attending Physician: Dr. Chalino Victoria   Primary Care Provider: Kalie Neely MD         Patient information was obtained from relative(s), past medical records and ER records.     Subjective:     Principal Problem:Encephalopathy    Chief Complaint:   Chief Complaint   Patient presents with    Altered Mental Status        HPI: Griselda Neely is a 73 y.o. female with a history as below who presented to the ED from her cardiologist office via  for evaluation of altered mental status changes.  Sister at bedside reports patient has been in [facility] s/p discharge from hospital ~ 3 weeks ago, diagnosis unclear as family states "I know she was started on anti-seizure medications, Keppra and vimpat. Sister at bedside further states patient stopped eating and drinking on Friday.  Further reports patient with ESRD, had dialysis on Wednesday and Friday but was taken off early. Also reports went to HD on Monday.  Per chart review, patient hospitalized In July 2019 and developed acute encephalopathy during that admission, neurology was consulted with extensive work-up and non-acute findings. On admit, labs and imaging reviewed.  Neurology consulted with recs for MRI/MRA and EEG.  Admitted for acute encephalitis ?metabolic +/- uremia +/- infectious process  +/- hypoglycemia +/- medications.  Nephrology consulted for inpatient HD.                   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    both legs    CHF (congestive heart failure)     Encounter for blood transfusion 03/2016    Gout     Hypertension     Mitral valve regurgitation     Osteoarthritis     Pancreatitis     Peripheral vascular disease     Peritoneal dialysis catheter in place     Pneumonia 09/09/2017    Renal failure        Past Surgical History:   Procedure " Laterality Date    ACHILLES TENDON SURGERY Right     APPENDECTOMY      CARDIAC CATHETERIZATION  07/03/2017    CHOLECYSTECTOMY      heal surgery Right     HYSTERECTOMY      INSERTION-PERITONEAL DIALYSIS CATHETER-LAPAROSCOPIC N/A 4/26/2016    [facility]formed by [facility] at [facility] OR    PARATHYROIDECTOMY      PARATHYROIDECTOMY  07/13/2017    PARATHYROIDECTOMY N/A 7/13/2017    Performed by [facility]an [facility]oughby [facility] at [facility] OR    PERITONEAL CATHETER INSERTION      REIMPLANTATION-PARATHYROID TISSUE N/A 7/13/2017    Per[facility] at [facility] OR    REMOVAL-CATHETER-DIALYSIS-PERITONEAL N/A 9/19/2017    Per[facility] by [facility]ough[facility] at [facility] OR    RENAL BIOPSY      vocal cord nodule         Review of patient's allergies indicates:  No Known Allergies    No current facility-administered medications on file prior to encounter.      Current Outpatient Medications on File Prior to Encounter   Medication Sig    albuterol-ipratropium 2.5mg-0.5mg/3mL (DUO-NEB) 0.5 mg-3 mg(2.5 mg base)/3 mL nebulizer solution Take 3 mLs by nebulization every 6 (six) hours as needed for Wheezing. Rescue    ALPRAZolam (XANAX) 0.25 MG tablet Take 1 tablet (0.25 mg total) by mouth 2 (two) times daily as needed for Anxiety.    aspirin (ECOTRIN) 81 MG EC tablet Take 81 mg by mouth once daily.    budesonide-formoterol 160-4.5 mcg (SYMBICORT) 160-4.5 mcg/actuation HFAA Inhale 2 puffs into the lungs every 12 (twelve) hours. Controller    isosorbide mononitrate (IMDUR) 30 MG 24 hr tablet Take 30 mg by mouth once daily.    metoprolol succinate (TOPROL-XL) 25 MG 24 hr tablet Take 25 mg by mouth once daily.     Family History     Problem Relation (Age of Onset)    Arthritis Mother    Diabetes Mother, Father    Early death Sister, Sister    Heart disease Sister, Maternal Grandfather, Mother    Heart failure Mother    Hypertension Mother        Tobacco Use    Smoking status: Current Some Day Smoker      Packs/day: 0.50     Years: 45.00     Pack years: 22.50    Smokeless tobacco: Never Used   Substance and Sexual Activity    Alcohol use: No    Drug use: No    Sexual activity: Not on file     Review of Systems   Constitutional: Positive for appetite change. Negative for chills, diaphoresis and fever.   HENT: Negative for congestion, postnasal drip, sinus pressure and sore throat.    Eyes: Negative for visual disturbance.   Respiratory: Negative for cough, chest tightness, shortness of breath and wheezing.    Cardiovascular: Negative for chest pain, palpitations and leg swelling.   Gastrointestinal: Negative for abdominal distention, abdominal pain, blood in stool, constipation, diarrhea, nausea and vomiting.   Endocrine: Negative.    Genitourinary: Negative for dysuria.        Anuric   Musculoskeletal: Negative.    Skin: Negative.    Allergic/Immunologic: Negative.    Neurological: Positive for speech difficulty and weakness. Negative for dizziness, numbness and headaches.   Hematological: Negative.    Psychiatric/Behavioral: Positive for agitation.     Objective:     Vital Signs (Most Recent):  Temp: 99.4 °F (37.4 °C) (08/06/19 1705)  Pulse: (!) 138 (08/06/19 1705)  Resp: 18 (08/06/19 1705)  BP: (!) 139/105 (08/06/19 1705)  SpO2: 96 % (08/06/19 1705) Vital Signs (24h Range):  Temp:  [99.4 °F (37.4 °C)] 99.4 °F (37.4 °C)  Pulse:  [138] 138  Resp:  [18] 18  SpO2:  [96 %] 96 %  BP: (139)/(105) 139/105     Weight: 50.8 kg (112 lb)(per family report)  Body mass index is 18.64 kg/m².    Physical Exam   Constitutional: She is cooperative.  Non-toxic appearance. No distress.   HENT:   Head: Normocephalic and atraumatic.   Eyes: Pupils are equal, round, and reactive to light. Conjunctivae and lids are normal.   Neck: Trachea normal, normal range of motion and full passive range of motion without pain. Neck supple. Normal carotid pulses and no JVD present. No tracheal deviation present. No thyroid mass and no thyromegaly  present.   Cardiovascular: Normal rate, regular rhythm, S1 normal, S2 normal, normal heart sounds and normal pulses.   Pulmonary/Chest: Effort normal and breath sounds normal. No stridor.   Abdominal: Soft. Normal appearance and bowel sounds are normal. There is no tenderness.   Musculoskeletal: Normal range of motion.   LLE calciphylaxis   Neurological: She is alert. She has normal strength. No cranial nerve deficit or sensory deficit.   Skin: Skin is warm, dry and intact. She is not diaphoretic. No cyanosis. Nails show no clubbing.   Psychiatric: She has a normal mood and affect. Her speech is normal and behavior is normal. Judgment and thought content normal. Cognition and memory are normal.         CRANIAL NERVES     CN III, IV, VI   Pupils are equal, round, and reactive to light.       Significant Labs:   Blood Culture: No results for input(s): LABBLOO in the last 48 hours.  CBC:   Recent Labs   Lab 08/06/19 1747   WBC 5.24  5.24   HGB 11.2*  11.2*   HCT 36.0*  36.0*   *  138*     CMP:   Recent Labs   Lab 08/06/19 1747     140   K 3.9  3.9   CL 95  95   CO2 29  29   *  154*   BUN 20  20   CREATININE 7.2*  7.2*   CALCIUM 9.7  9.7   PROT 7.2  7.2   ALBUMIN 3.2*  3.2*   BILITOT 1.2*  1.2*   ALKPHOS 54*  54*   AST 18  18   ALT 8*  8*   ANIONGAP 16  16   EGFRNONAA 5.2*  5.2*     Cardiac Markers: No results for input(s): CKMB, MYOGLOBIN, BNP, TROPISTAT in the last 48 hours.  Coagulation:   Recent Labs   Lab 08/06/19 1747   INR 1.1  1.1     Lactic Acid:   Recent Labs   Lab 08/06/19  1822   LACTATE 2.0*     Magnesium: No results for input(s): MG in the last 48 hours.  Troponin:   Recent Labs   Lab 08/06/19 1747   TROPONINI 0.092     TSH:   Recent Labs   Lab 08/06/19 1747   TSH 2.610     Urine Culture: No results for input(s): LABURIN in the last 48 hours.    Significant Imaging: I have reviewed all pertinent imaging results/findings within the past 24 hours.     Ct Head  Without Contrast    Result Date: 8/6/2019  CMS MANDATED QUALITY DATA - CT RADIATION - 436 All CT scans at this facility utilize dose modulation, iterative reconstruction, and/or weight based dosing when appropriate to reduce radiation dose to as low as reasonably achievable. EXAMINATION: CT HEAD WITHOUT CONTRAST CLINICAL HISTORY: Confusion/delirium, altered LOC, unexplained;. COMPARISON: 07/12/2019. FINDINGS: There are no findings of acute hemorrhage or infarction.  Chronic small vessel ischemic changes are again noted within both hemispheres.  There is a focal area of remote small vessel infarction within the right basal ganglia and corona radiata, unchanged. The ventricular system is appropriate in size and position for age.  There are no extra-axial fluid collections. Reviewed at bone windows no skull fracture is identified. The visualized portions of paranasal sinuses and mastoid air cells are appropriately aerated.     No acute intracranial abnormality. Chronic small vessel ischemic changes. Electronically signed by: Camacho Arce IV., MD Date:    08/06/2019 Time:    19:32    X-ray Chest Ap Portable    Result Date: 8/6/2019  EXAMINATION: XR CHEST AP PORTABLE CLINICAL HISTORY: CHF; COMPARISON: 07/10/2019. FINDINGS: The heart is enlarged but stable.  Mild central pulmonary vascular prominence remains unchanged.  A prostatic valve is positioned over the cardiac silhouette.  There is arteriosclerotic calcification within the aortic arch. There are persistent ground-glass type opacities in the right mid and lower lung zone.  There are some elements of platelike atelectasis or scarring in the lower lung zone.  Blunting of the right costophrenic angle persists but has improved slightly in the interval.  The left lung is free of confluent infiltrates or significant effusions.     Persistence of predominantly ground-glass type pulmonary opacities in the right mid and lower lung zone.  There is some elements of right  basilar platelike atelectasis or scarring. Persistent small right-sided effusion. Stable cardiomegaly and additional observations as above. Electronically signed by: Camacho Arce IV., MD Date:    08/06/2019 Time:    18:59      Assessment/Plan:     * Encephalopathy, metabolic   AMS  - baseline unclear   Multifactorial - ?metabolic +/- uremia +/- infectious process  +/- hypoglycemia +/- medications induced. Patient with recent admission July 2019 and developed acute encephalopathy at that time. Neurology consulted, extensive work-up completed; started on vimpat and keppra.  CT completed and without acute findings. CXR shows persistence of predominantly ground-glass type pulmonary opacities in the right mid and lower lung zone, ?poss contributory to acute encephalitis  -Continue IV abx   -Blood cultures pending  -Neuro checks q2 x 12 hours   -Neurology consulted  -Given extensive neurology work-up will defer further testing to neurology (MRI/MRA and EEG)  -Continue keppra and vimpat  -IV abx prophylactic pending blood culture results      Altered mental status  Etiology unclear. See above      Atrial fibrillation with RVR  Patient with PAF on admit telemetry showed a-fib rvr, started on cardizem gtt in ED  -Continuous cardiac monitoring   -Continue cardizem gtt  -Cardiology consult appreciated  -Restart home metoprolol/ASA     Pneumonia due to infectious organism  On admit CXR shows persistence of predominantly ground-glass type pulmonary opacities in the right mid and lower lung zone, ?poss contributory to acute encephalitis  -Continue IV abx   -Blood cultures pending      Elevated lactic acid level  Likely metabolic. She is afebrile and without leukocytosis  -Continue IV fluids  -Lactic acid levels q4  -blood cultures pending  -IV abx prophylactic until cultures result         Hypoglycemia  -Accuchecks q6  -Hypoglycemia protocol      ESRD (end stage renal disease)  Nephrology consulted for inpatient HD      VTE Risk  Mitigation (From admission, onward)        Ordered     heparin (porcine) injection 5,000 Units  Every 8 hours      08/06/19 2001     Place ROSANNA hose  Until discontinued      08/06/19 2001     IP VTE HIGH RISK PATIENT  Once      08/06/19 2001             TODD Trujillo  Department of Hospital Medicine   Critical access hospital

## 2019-08-07 NOTE — CONSULTS
"  [facility]  Adult Nutrition  Consult Note    SUMMARY     Recommendations    Recommendation/Intervention: 1. Continue current diet. 2. Added Ice Cream Milk Shake + Benecalorie TID (to provide 2250 kcal/day and 57 g/day protein)   Goals: 1&2. Patient to meet at least 75% of estimated needs.   Nutrition Goal Status: new  Communication of RD Recs: reviewed with RN    Reason for Assessment    Reason For Assessment: consult  Diagnosis: renal disease  Relevant Medical History: ESRD on HD, encephalopathy   Interdisciplinary Rounds: attended      Nutrition/Diet History    Typical Food/Fluid Intake: regular diet, very poor PO intake  Food Preferences: frosted flakes and whole milk  Spiritual, Cultural Beliefs, Restoration Practices, Values that Affect Care: no  Food Allergies: NKFA  Factors Affecting Nutritional Intake: decreased appetite    Anthropometrics    Temp: 97.5 °F (36.4 °C)  Height Method: Stated  Height: 5' 5" (165.1 cm)  Height (inches): 65 in  Weight Method: Stated  Weight: 50.8 kg (111 lb 15.9 oz)  Weight (lb): 111.99 lb  Ideal Body Weight (IBW), Female: 125 lb  % Ideal Body Weight, Female (lb): 89.59 lb  % Ideal Body Weight Malnutrition: 80-90% - mild deficit  BMI (Calculated): 18.7  BMI Grade: 18.5-24.9 - normal  Weight Loss: unintentional  Usual Body Weight (UBW), k kg  Weight Change Amount: 20 lb(Severe weight loss; >5% in <1 month)  % Usual Body Weight: 84.84  % Weight Change From Usual Weight: -15.33 %       Lab/Procedures/Meds    Pertinent Labs Reviewed: reviewed  Pertinent Labs Comments: Hgb 10.5, Hct 34.3, Na 135, K 3.2, Crea 7.0, GFR 6.2, Alk Phos 49, Alb 2.9, ALT 6   Pertinent Medications Reviewed: reviewed      Estimated/Assessed Needs    Weight Used For Calorie Calculations: 50.8 kg (111 lb 15.9 oz)(dry weight )  Energy Calorie Requirements (kcal):  -  (35 - 40)  Energy Need Method: Kcal/kg  Protein Requirements: 61 - 76 (1.2 - 1.5 g/kg dry weight)   Weight Used For " Protein Calculations: 50.8 kg (111 lb 15.9 oz)  Fluid Requirements (mL): 1000 ml + UOP /day or per MD      RDA Method (mL): 1778     Nutrition Prescription Ordered    Current Diet Order: cardiac     Evaluation of Received Nutrient/Fluid Intake    Energy Calories Required: not meeting needs  Protein Required: not meeting needs  Fluid Required: not meeting needs  Tolerance: other (see comments)(unable to assess )  % Intake of Estimated Energy Needs: 0 - 25 %  % Meal Intake: 0 - 25 %    Nutrition Risk    Level of Risk/Frequency of Follow-up: high     Assessment and Plan    Patient diet started at time of rounds. Patient has not yet had a meal in the hospital. Patient states she has had no appetite or interest in eating the past few days, but requested cereal and milk. RD notified kitchen to send. RD offered supplment, patient refused Nepro and Ensure. Patient did agree to a milkshake with meals.     Monitor and Evaluation    Food and Nutrient Intake: energy intake, food and beverage intake  Food and Nutrient Adminstration: diet order  Anthropometric Measurements: weight, weight change, body mass index  Biochemical Data, Medical Tests and Procedures: electrolyte and renal panel, glucose/endocrine profile, inflammatory profile, lipid profile  Nutrition-Focused Physical Findings: overall appearance, extremities, muscles and bones, head and eyes, skin     Malnutrition Assessment  Malnutrition Type: chronic illness, acute illness or injury  Energy Intake: other (see comments)(<50% of estimated energy intake for >5days)          Weight Loss (Malnutrition): greater than 5% in 1 month  Energy Intake (Malnutrition): less than or equal to 50% for greater than or equal to 5 days  Subcutaneous Fat (Malnutrition): severe depletion  Muscle Mass (Malnutrition): severe depletion   Orbital Region (Subcutaneous Fat Loss): severe depletion   [place] Region (Muscle Loss): moderate depletion  Clavicle Bone Region (Muscle Loss): severe  depletion  Clavicle and Acromion Bone Region (Muscle Loss): severe depletion       Subcutaneous Fat Loss (Final Summary): severe protein-calorie malnutrition  Muscle Loss Evaluation (Final Summary): severe protein-calorie malnutrition    Severe Weight Loss (Malnutrition): greater than 5% in 1 month    Nutrition Follow-Up    RD Follow-up?: Yes     Bel Leo 08/07/2019 2:47 PM

## 2019-08-07 NOTE — PROGRESS NOTES
VANCOMYCIN PHARMACOKINETIC NOTE:  Vancomycin Day # 1    Objective/Assessment:    Diagnosis/Indication for Vancomycin:  Bacteremia  73 y.o., female; Actual Body Weight = 58 kg (127 lb 13.9 oz).    The patient has the following labs:     8/7/2019 Estimated Creatinine Clearance: 6.4 mL/min (A) (based on SCr of 7.2 mg/dL (H)). Lab Results   Component Value Date    BUN 20 08/06/2019    BUN 20 08/06/2019     Lab Results   Component Value Date    WBC 5.24 08/06/2019    WBC 5.24 08/06/2019        Patient is receiving hemodialysis therapy.    Plan:  Adjust vancomycin dose and/or frequency based on the patient's actual weight and renal function:  Vancomycin 750 mg IV once, then follow labs.  Orders have been entered into patient's chart.          Plan for Dialysis Patient:  Give first dose of IV vancomycin.  Will follow random vancomycin levels  Random vancomycin level has been ordered 8/8/19 @22:30      Thank you for allowing us to participate in this patient's care.     Dario Sargent 8/7/2019 5:20 AM  Department of Pharmacy  Ext [phone]

## 2019-08-07 NOTE — PLAN OF CARE
Problem: Oral Intake Inadequate  Goal: Improved Oral Intake  Outcome: Ongoing (interventions implemented as appropriate)  Patient to meet at least 75% of needs. RD ordered milkshake with benecalorie TID. Encourage PO intake of meals and supplements.

## 2019-08-07 NOTE — PLAN OF CARE
08/07/19 1136   Discharge Assessment   Assessment Type Discharge Planning Reassessment   Do you have any problems affording any of your prescribed medications? No   Discharge Plan A Home Health  (Family requesting SN, PT, OT)   DME Needed Upon Discharge  none

## 2019-08-07 NOTE — ASSESSMENT & PLAN NOTE
On admit CXR shows persistence of predominantly ground-glass type pulmonary opacities in the right mid and lower lung zone, ?poss contributory to acute encephalitis  -Continue IV abx   -Blood cultures pending

## 2019-08-07 NOTE — SUBJECTIVE & OBJECTIVE
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    both legs    CHF (congestive heart failure)     Encounter for blood transfusion 03/2016    Gout     Hypertension     Mitral valve regurgitation     Osteoarthritis     Pancreatitis     Peripheral vascular disease     Peritoneal dialysis catheter in place     Pneumonia 09/09/2017    Renal failure        Past Surgical History:   Procedure Laterality Date    ACHILLES TENDON SURGERY Right     APPENDECTOMY      CARDIAC CATHETERIZATION  07/03/2017    CHOLECYSTECTOMY      heal surgery Right     HYSTERECTOMY      INSERTION-PERITONEAL DIALYSIS CATHETER-LAPAROSCOPIC N/A 4/26/2016    Performed by Leah Cortes MD at [facility] OR    PARATHYROIDECTOMY      PARATHYROIDECTOMY  07/13/2017    PARATHYROIDECTOMY N/A 7/13/2017    Performed by Jhoan Willoughby MD at [facility] OR    PERITONEAL CATHETER INSERTION      REIMPLANTATION-PARATHYROID TISSUE N/A 7/13/2017    Performed by Jhoan Willoughby MD at [facility] OR    REMOVAL-CATHETER-DIALYSIS-PERITONEAL N/A 9/19/2017    Performed by Jhoan Willoughby MD at [facility] OR    RENAL BIOPSY      vocal cord nodule         Review of patient's allergies indicates:  No Known Allergies    No current facility-administered medications on file prior to encounter.      Current Outpatient Medications on File Prior to Encounter   Medication Sig    albuterol-ipratropium 2.5mg-0.5mg/3mL (DUO-NEB) 0.5 mg-3 mg(2.5 mg base)/3 mL nebulizer solution Take 3 mLs by nebulization every 6 (six) hours as needed for Wheezing. Rescue    ALPRAZolam (XANAX) 0.25 MG tablet Take 1 tablet (0.25 mg total) by mouth 2 (two) times daily as needed for Anxiety.    aspirin (ECOTRIN) 81 MG EC tablet Take 81 mg by mouth once daily.    budesonide-formoterol 160-4.5 mcg (SYMBICORT) 160-4.5 mcg/actuation HF Inhale 2 puffs into the lungs every 12 (twelve) hours. Controller    isosorbide mononitrate (IMDUR) 30 MG 24 hr tablet  Take 30 mg by mouth once daily.    metoprolol succinate (TOPROL-XL) 25 MG 24 hr tablet Take 25 mg by mouth once daily.     Family History     Problem Relation (Age of Onset)    Arthritis Mother    Diabetes Mother, Father    Early death Sister, Sister    Heart disease Sister, Maternal Grandfather, Mother    Heart failure Mother    Hypertension Mother        Tobacco Use    Smoking status: Current Some Day Smoker     Packs/day: 0.50     Years: 45.00     Pack years: 22.50    Smokeless tobacco: Never Used   Substance and Sexual Activity    Alcohol use: No    Drug use: No    Sexual activity: Not on file     Review of Systems   Constitutional: Positive for appetite change. Negative for chills, diaphoresis and fever.   HENT: Negative for congestion, postnasal drip, sinus pressure and sore throat.    Eyes: Negative for visual disturbance.   Respiratory: Negative for cough, chest tightness, shortness of breath and wheezing.    Cardiovascular: Negative for chest pain, palpitations and leg swelling.   Gastrointestinal: Negative for abdominal distention, abdominal pain, blood in stool, constipation, diarrhea, nausea and vomiting.   Endocrine: Negative.    Genitourinary: Negative for dysuria.        Anuric   Musculoskeletal: Negative.    Skin: Negative.    Allergic/Immunologic: Negative.    Neurological: Positive for speech difficulty and weakness. Negative for dizziness, numbness and headaches.   Hematological: Negative.    Psychiatric/Behavioral: Positive for agitation.     Objective:     Vital Signs (Most Recent):  Temp: 99.4 °F (37.4 °C) (08/06/19 1705)  Pulse: (!) 138 (08/06/19 1705)  Resp: 18 (08/06/19 1705)  BP: (!) 139/105 (08/06/19 1705)  SpO2: 96 % (08/06/19 1705) Vital Signs (24h Range):  Temp:  [99.4 °F (37.4 °C)] 99.4 °F (37.4 °C)  Pulse:  [138] 138  Resp:  [18] 18  SpO2:  [96 %] 96 %  BP: (139)/(105) 139/105     Weight: 50.8 kg (112 lb)(per family report)  Body mass index is 18.64 kg/m².    Physical Exam    Constitutional: She is cooperative.  Non-toxic appearance. No distress.   HENT:   Head: Normocephalic and atraumatic.   Eyes: Pupils are equal, round, and reactive to light. Conjunctivae and lids are normal.   Neck: Trachea normal, normal range of motion and full passive range of motion without pain. Neck supple. Normal carotid pulses and no JVD present. No tracheal deviation present. No thyroid mass and no thyromegaly present.   Cardiovascular: Normal rate, regular rhythm, S1 normal, S2 normal, normal heart sounds and normal pulses.   Pulmonary/Chest: Effort normal and breath sounds normal. No stridor.   Abdominal: Soft. Normal appearance and bowel sounds are normal. There is no tenderness.   Musculoskeletal: Normal range of motion.   LLE calciphylaxis   Neurological: She is alert. She has normal strength. No cranial nerve deficit or sensory deficit.   Skin: Skin is warm, dry and intact. She is not diaphoretic. No cyanosis. Nails show no clubbing.   Psychiatric: She has a normal mood and affect. Her speech is normal and behavior is normal. Judgment and thought content normal. Cognition and memory are normal.         CRANIAL NERVES     CN III, IV, VI   Pupils are equal, round, and reactive to light.       Significant Labs:   Blood Culture: No results for input(s): LABBLOO in the last 48 hours.  CBC:   Recent Labs   Lab 08/06/19  1747   WBC 5.24  5.24   HGB 11.2*  11.2*   HCT 36.0*  36.0*   *  138*     CMP:   Recent Labs   Lab 08/06/19  1747     140   K 3.9  3.9   CL 95  95   CO2 29  29   *  154*   BUN 20  20   CREATININE 7.2*  7.2*   CALCIUM 9.7  9.7   PROT 7.2  7.2   ALBUMIN 3.2*  3.2*   BILITOT 1.2*  1.2*   ALKPHOS 54*  54*   AST 18  18   ALT 8*  8*   ANIONGAP 16  16   EGFRNONAA 5.2*  5.2*     Cardiac Markers: No results for input(s): CKMB, MYOGLOBIN, BNP, TROPISTAT in the last 48 hours.  Coagulation:   Recent Labs   Lab 08/06/19  1747   INR 1.1  1.1     Lactic Acid:    Recent Labs   Lab 08/06/19  1822   LACTATE 2.0*     Magnesium: No results for input(s): MG in the last 48 hours.  Troponin:   Recent Labs   Lab 08/06/19  1747   TROPONINI 0.092     TSH:   Recent Labs   Lab 08/06/19  1747   TSH 2.610     Urine Culture: No results for input(s): LABURIN in the last 48 hours.    Significant Imaging: I have reviewed all pertinent imaging results/findings within the past 24 hours.     Ct Head Without Contrast    Result Date: 8/6/2019  CMS MANDATED QUALITY DATA - CT RADIATION - 436 All CT scans at this facility utilize dose modulation, iterative reconstruction, and/or weight based dosing when appropriate to reduce radiation dose to as low as reasonably achievable. EXAMINATION: CT HEAD WITHOUT CONTRAST CLINICAL HISTORY: Confusion/delirium, altered LOC, unexplained;. COMPARISON: 07/12/2019. FINDINGS: There are no findings of acute hemorrhage or infarction.  Chronic small vessel ischemic changes are again noted within both hemispheres.  There is a focal area of remote small vessel infarction within the right basal ganglia and corona radiata, unchanged. The ventricular system is appropriate in size and position for age.  There are no extra-axial fluid collections. Reviewed at bone windows no skull fracture is identified. The visualized portions of paranasal sinuses and mastoid air cells are appropriately aerated.     No acute intracranial abnormality. Chronic small vessel ischemic changes. Electronically signed by: Camacho Arce IV., MD Date:    08/06/2019 Time:    19:32    X-ray Chest Ap Portable    Result Date: 8/6/2019  EXAMINATION: XR CHEST AP PORTABLE CLINICAL HISTORY: CHF; COMPARISON: 07/10/2019. FINDINGS: The heart is enlarged but stable.  Mild central pulmonary vascular prominence remains unchanged.  A prostatic valve is positioned over the cardiac silhouette.  There is arteriosclerotic calcification within the aortic arch. There are persistent ground-glass type opacities in the right  mid and lower lung zone.  There are some elements of platelike atelectasis or scarring in the lower lung zone.  Blunting of the right costophrenic angle persists but has improved slightly in the interval.  The left lung is free of confluent infiltrates or significant effusions.     Persistence of predominantly ground-glass type pulmonary opacities in the right mid and lower lung zone.  There is some elements of right basilar platelike atelectasis or scarring. Persistent small right-sided effusion. Stable cardiomegaly and additional observations as above. Electronically signed by: Camacho Arce IV., MD Date:    08/06/2019 Time:    18:59

## 2019-08-08 LAB
ALBUMIN SERPL BCP-MCNC: 2.7 G/DL (ref 3.5–5.2)
ALP SERPL-CCNC: 51 U/L (ref 55–135)
ALT SERPL W/O P-5'-P-CCNC: 6 U/L (ref 10–44)
AMMONIA PLAS-SCNC: 18 UMOL/L (ref 10–50)
ANION GAP SERPL CALC-SCNC: 15 MMOL/L (ref 8–16)
AST SERPL-CCNC: 15 U/L (ref 10–40)
BASOPHILS # BLD AUTO: 0.02 K/UL (ref 0–0.2)
BASOPHILS NFR BLD: 0.4 % (ref 0–1.9)
BILIRUB SERPL-MCNC: 0.6 MG/DL (ref 0.1–1)
BUN SERPL-MCNC: 15 MG/DL (ref 8–23)
CALCIUM SERPL-MCNC: 8.8 MG/DL (ref 8.7–10.5)
CHLORIDE SERPL-SCNC: 98 MMOL/L (ref 95–110)
CO2 SERPL-SCNC: 27 MMOL/L (ref 23–29)
CREAT SERPL-MCNC: 5.9 MG/DL (ref 0.5–1.4)
DIFFERENTIAL METHOD: ABNORMAL
EOSINOPHIL # BLD AUTO: 0.1 K/UL (ref 0–0.5)
EOSINOPHIL NFR BLD: 1.6 % (ref 0–8)
ERYTHROCYTE [DISTWIDTH] IN BLOOD BY AUTOMATED COUNT: 22.2 % (ref 11.5–14.5)
EST. GFR  (AFRICAN AMERICAN): 7.6 ML/MIN/1.73 M^2
EST. GFR  (NON AFRICAN AMERICAN): 6.6 ML/MIN/1.73 M^2
FOLATE SERPL-MCNC: 4.5 NG/ML (ref 4–24)
GLUCOSE SERPL-MCNC: 71 MG/DL (ref 70–110)
GLUCOSE SERPL-MCNC: 79 MG/DL (ref 70–110)
GLUCOSE SERPL-MCNC: 81 MG/DL (ref 70–110)
GLUCOSE SERPL-MCNC: 96 MG/DL (ref 70–110)
HCT VFR BLD AUTO: 30.7 % (ref 37–48.5)
HGB BLD-MCNC: 9.7 G/DL (ref 12–16)
IMM GRANULOCYTES # BLD AUTO: 0.02 K/UL (ref 0–0.04)
IMM GRANULOCYTES NFR BLD AUTO: 0.4 % (ref 0–0.5)
LYMPHOCYTES # BLD AUTO: 0.9 K/UL (ref 1–4.8)
LYMPHOCYTES NFR BLD: 16.4 % (ref 18–48)
MAGNESIUM SERPL-MCNC: 1.7 MG/DL (ref 1.6–2.6)
MCH RBC QN AUTO: 25.7 PG (ref 27–31)
MCHC RBC AUTO-ENTMCNC: 31.6 G/DL (ref 32–36)
MCV RBC AUTO: 81 FL (ref 82–98)
MONOCYTES # BLD AUTO: 0.8 K/UL (ref 0.3–1)
MONOCYTES NFR BLD: 14.6 % (ref 4–15)
NEUTROPHILS # BLD AUTO: 3.7 K/UL (ref 1.8–7.7)
NEUTROPHILS NFR BLD: 66.6 % (ref 38–73)
NRBC BLD-RTO: 1 /100 WBC
PHOSPHATE SERPL-MCNC: 2.4 MG/DL (ref 2.7–4.5)
PLATELET # BLD AUTO: 130 K/UL (ref 150–350)
PMV BLD AUTO: 9.8 FL (ref 9.2–12.9)
POTASSIUM SERPL-SCNC: 3 MMOL/L (ref 3.5–5.1)
PROT SERPL-MCNC: 6.3 G/DL (ref 6–8.4)
RBC # BLD AUTO: 3.77 M/UL (ref 4–5.4)
SODIUM SERPL-SCNC: 140 MMOL/L (ref 136–145)
TSH SERPL DL<=0.005 MIU/L-ACNC: 2.77 UIU/ML (ref 0.34–5.6)
VIT B12 SERPL-MCNC: 1045 PG/ML (ref 210–950)
WBC # BLD AUTO: 5.48 K/UL (ref 3.9–12.7)

## 2019-08-08 PROCEDURE — 83735 ASSAY OF MAGNESIUM: CPT

## 2019-08-08 PROCEDURE — 82607 VITAMIN B-12: CPT

## 2019-08-08 PROCEDURE — 25000003 PHARM REV CODE 250: Performed by: NURSE PRACTITIONER

## 2019-08-08 PROCEDURE — 21000000 HC CCU ICU ROOM CHARGE

## 2019-08-08 PROCEDURE — 80177 DRUG SCRN QUAN LEVETIRACETAM: CPT

## 2019-08-08 PROCEDURE — 82746 ASSAY OF FOLIC ACID SERUM: CPT

## 2019-08-08 PROCEDURE — 80053 COMPREHEN METABOLIC PANEL: CPT

## 2019-08-08 PROCEDURE — 25000003 PHARM REV CODE 250: Performed by: INTERNAL MEDICINE

## 2019-08-08 PROCEDURE — 82962 GLUCOSE BLOOD TEST: CPT

## 2019-08-08 PROCEDURE — 84100 ASSAY OF PHOSPHORUS: CPT

## 2019-08-08 PROCEDURE — 63600175 PHARM REV CODE 636 W HCPCS: Performed by: NURSE PRACTITIONER

## 2019-08-08 PROCEDURE — 25000003 PHARM REV CODE 250: Performed by: HOSPITALIST

## 2019-08-08 PROCEDURE — 84443 ASSAY THYROID STIM HORMONE: CPT

## 2019-08-08 PROCEDURE — 25000003 PHARM REV CODE 250: Performed by: PSYCHIATRY & NEUROLOGY

## 2019-08-08 PROCEDURE — 94761 N-INVAS EAR/PLS OXIMETRY MLT: CPT

## 2019-08-08 PROCEDURE — 36415 COLL VENOUS BLD VENIPUNCTURE: CPT

## 2019-08-08 PROCEDURE — 63600175 PHARM REV CODE 636 W HCPCS: Performed by: HOSPITALIST

## 2019-08-08 PROCEDURE — 82140 ASSAY OF AMMONIA: CPT

## 2019-08-08 PROCEDURE — 80202 ASSAY OF VANCOMYCIN: CPT

## 2019-08-08 PROCEDURE — 85025 COMPLETE CBC W/AUTO DIFF WBC: CPT

## 2019-08-08 PROCEDURE — 27000221 HC OXYGEN, UP TO 24 HOURS

## 2019-08-08 PROCEDURE — 63600175 PHARM REV CODE 636 W HCPCS: Performed by: INTERNAL MEDICINE

## 2019-08-08 PROCEDURE — 25000003 PHARM REV CODE 250

## 2019-08-08 PROCEDURE — 80339 ANTIEPILEPTICS NOS 1-3: CPT

## 2019-08-08 RX ORDER — METOPROLOL TARTRATE 1 MG/ML
INJECTION, SOLUTION INTRAVENOUS
Status: COMPLETED
Start: 2019-08-08 | End: 2019-08-08

## 2019-08-08 RX ORDER — HYDROCODONE BITARTRATE AND ACETAMINOPHEN 5; 325 MG/1; MG/1
1 TABLET ORAL ONCE
Status: COMPLETED | OUTPATIENT
Start: 2019-08-08 | End: 2019-08-08

## 2019-08-08 RX ORDER — METOPROLOL TARTRATE 1 MG/ML
5 INJECTION, SOLUTION INTRAVENOUS ONCE
Status: DISCONTINUED | OUTPATIENT
Start: 2019-08-08 | End: 2019-08-12

## 2019-08-08 RX ORDER — MORPHINE SULFATE 2 MG/ML
1 INJECTION, SOLUTION INTRAMUSCULAR; INTRAVENOUS ONCE
Status: COMPLETED | OUTPATIENT
Start: 2019-08-08 | End: 2019-08-08

## 2019-08-08 RX ORDER — HYDROCODONE BITARTRATE AND ACETAMINOPHEN 7.5; 325 MG/1; MG/1
1 TABLET ORAL ONCE
Status: COMPLETED | OUTPATIENT
Start: 2019-08-08 | End: 2019-08-08

## 2019-08-08 RX ORDER — LOSARTAN POTASSIUM 25 MG/1
25 TABLET ORAL DAILY
Status: DISCONTINUED | OUTPATIENT
Start: 2019-08-08 | End: 2019-08-12

## 2019-08-08 RX ORDER — POTASSIUM CHLORIDE 20 MEQ/1
20 TABLET, EXTENDED RELEASE ORAL ONCE
Status: COMPLETED | OUTPATIENT
Start: 2019-08-08 | End: 2019-08-08

## 2019-08-08 RX ADMIN — LACOSAMIDE 50 MG: 50 TABLET, FILM COATED ORAL at 10:08

## 2019-08-08 RX ADMIN — POTASSIUM CHLORIDE 20 MEQ: 20 TABLET, EXTENDED RELEASE ORAL at 09:08

## 2019-08-08 RX ADMIN — DILTIAZEM HYDROCHLORIDE 5 MG/HR: 5 INJECTION INTRAVENOUS at 04:08

## 2019-08-08 RX ADMIN — DILTIAZEM HYDROCHLORIDE 180 MG: 180 CAPSULE, COATED, EXTENDED RELEASE ORAL at 09:08

## 2019-08-08 RX ADMIN — MUPIROCIN: 20 OINTMENT TOPICAL at 09:08

## 2019-08-08 RX ADMIN — ASPIRIN 81 MG: 81 TABLET, COATED ORAL at 09:08

## 2019-08-08 RX ADMIN — MORPHINE SULFATE 1 MG: 2 INJECTION, SOLUTION INTRAMUSCULAR; INTRAVENOUS at 11:08

## 2019-08-08 RX ADMIN — MUPIROCIN: 20 OINTMENT TOPICAL at 10:08

## 2019-08-08 RX ADMIN — LEVETIRACETAM 500 MG: 500 TABLET, FILM COATED ORAL at 09:08

## 2019-08-08 RX ADMIN — HEPARIN SODIUM 5000 UNITS: 5000 INJECTION INTRAVENOUS; SUBCUTANEOUS at 09:08

## 2019-08-08 RX ADMIN — HYDROCODONE BITARTRATE AND ACETAMINOPHEN 1 TABLET: 5; 325 TABLET ORAL at 02:08

## 2019-08-08 RX ADMIN — VANCOMYCIN HYDROCHLORIDE 1000 MG: 1 INJECTION, POWDER, LYOPHILIZED, FOR SOLUTION INTRAVENOUS at 02:08

## 2019-08-08 RX ADMIN — HEPARIN SODIUM 5000 UNITS: 5000 INJECTION INTRAVENOUS; SUBCUTANEOUS at 05:08

## 2019-08-08 RX ADMIN — HYDROCODONE BITARTRATE AND ACETAMINOPHEN 1 TABLET: 7.5; 325 TABLET ORAL at 09:08

## 2019-08-08 RX ADMIN — HEPARIN SODIUM 5000 UNITS: 5000 INJECTION INTRAVENOUS; SUBCUTANEOUS at 02:08

## 2019-08-08 RX ADMIN — METOPROLOL SUCCINATE 50 MG: 50 TABLET, FILM COATED, EXTENDED RELEASE ORAL at 09:08

## 2019-08-08 RX ADMIN — ISOSORBIDE MONONITRATE 30 MG: 30 TABLET, EXTENDED RELEASE ORAL at 09:08

## 2019-08-08 RX ADMIN — CEFTRIAXONE SODIUM 2 G: 1 INJECTION, POWDER, FOR SOLUTION INTRAMUSCULAR; INTRAVENOUS at 09:08

## 2019-08-08 RX ADMIN — LOSARTAN POTASSIUM 25 MG: 25 TABLET, FILM COATED ORAL at 11:08

## 2019-08-08 RX ADMIN — METOPROLOL TARTRATE 5 MG: 1 INJECTION, SOLUTION INTRAVENOUS at 09:08

## 2019-08-08 NOTE — PROGRESS NOTES
"[facility] Medicine  Progress Note    Patient Name: Griselda Neely  MRN: [id number]  Patient Class: IP- Inpatient   Admission Date: 8/6/2019  Length of Stay: 1 days  Attending Physician: Chalino Victoria MD  Primary Care Provider: Kalie Neely MD        Subjective:     Principal Problem:Encephalopathy, toxic      HPI:  Griselda Neely is a 73 y.o. female with a history as below who presented to the ED from her cardiologist office via  for evaluation of altered mental status changes.  Sister at bedside reports patient has been in [facility] s/p discharge from hospital ~ 3 weeks ago, diagnosis unclear as family states "I know she was started on anti-seizure medications, Keppra and vimpat. Sister at bedside further states patient stopped eating and drinking on Friday.  Further reports patient with ESRD, had dialysis on Wednesday and Friday but was taken off early. Also reports went to HD on Monday.  Per chart review, patient hospitalized In July 2019 and developed acute encephalopathy during that admission, neurology was consulted with extensive work-up and non-acute findings. On admit, labs and imaging reviewed.  Neurology consulted with recs for MRI/MRA and EEG.  Admitted for acute encephalitis ?metabolic +/- uremia +/- infectious process  +/- hypoglycemia +/- medications.  Nephrology consulted for inpatient HD.                   Overview/Hospital Course:  No notes on file    Interval History:   Mental status is greatly improved.       Objective:     Vital Signs (Most Recent):  Temp: 98 °F (36.7 °C) (08/07/19 1720)  Pulse: 93 (08/07/19 1720)  Resp: 18 (08/07/19 1720)  BP: (!) 145/70 (08/07/19 1720)  SpO2: 97 % (08/07/19 1148) Vital Signs (24h Range):  Temp:  [97.3 °F (36.3 °C)-98.1 °F (36.7 °C)] 98 °F (36.7 °C)  Pulse:  [] 93  Resp:  [16-37] 18  SpO2:  [96 %-100 %] 97 %  BP: (111-159)/(46-98) 145/70     Weight: 50.8 kg (111 lb 15.9 oz)  Body mass index is 18.64 " kg/m².    Intake/Output Summary (Last 24 hours) at 8/7/2019 1913  Last data filed at 8/7/2019 1800  Gross per 24 hour   Intake 2709 ml   Output 3500 ml   Net -791 ml          Physical Exam   Constitutional: She appears well-nourished.   HENT:   Head: Normocephalic and atraumatic.   Eyes: Pupils are equal, round, and reactive to light. EOM are normal.   Neck: Normal range of motion. Neck supple.   Cardiovascular:   No murmur heard.  irr irr   Pulmonary/Chest: Effort normal. No respiratory distress.   Abdominal: Soft. Bowel sounds are normal. She exhibits no distension.   Neurological: She is alert.   Skin: Skin is warm and dry. Capillary refill takes less than 2 seconds.   Nursing note and vitals reviewed.      Significant Labs: All pertinent labs within the past 24 hours have been reviewed.    Significant Imaging: I have reviewed all pertinent imaging results/findings within the past 24 hours.      Assessment/Plan:      * Encephalopathy, metabolic   AMS  - baseline unclear   Multifactorial - ?metabolic +/- uremia +/- infectious process  +/- hypoglycemia +/- medications induced. Patient with recent admission July 2019 and developed acute encephalopathy at that time. Neurology consulted, extensive work-up completed; started on vimpat and keppra.  CT completed and without acute findings. CXR shows persistence of predominantly ground-glass type pulmonary opacities in the right mid and lower lung zone, ?poss contributory to acute encephalitis  -Continue IV abx   -Blood cultures pending  -Neuro checks q2 x 12 hours   -Neurology consulted  -Given extensive neurology work-up will defer further testing to neurology (MRI/MRA and EEG)  -Continue keppra and vimpat  -IV abx prophylactic pending blood culture results      Pneumonia due to infectious organism  On admit CXR shows persistence of predominantly ground-glass type pulmonary opacities in the right mid and lower lung zone, ?poss contributory to acute  encephalitis  -Continue IV abx   -Blood cultures pending      Altered mental status  Etiology unclear. See above      Elevated lactic acid level  Likely metabolic. She is afebrile and without leukocytosis  -Continue IV fluids  -Lactic acid levels q4  -blood cultures pending  -IV abx prophylactic until cultures result         Hypoglycemia  -Accuchecks q6  -Hypoglycemia protocol      Atrial fibrillation with RVR  Patient with PAF on admit telemetry showed a-fib rvr, started on cardizem gtt in ED  -Continuous cardiac monitoring   -Continue cardizem gtt  -Cardiology consult appreciated  -Restart home metoprolol/ASA     ESRD (end stage renal disease)  Nephrology consulted for inpatient HD        VTE Risk Mitigation (From admission, onward)        Ordered     heparin (porcine) injection 5,000 Units  Every 8 hours      08/06/19 2001     Place ROSANNA hose  Until discontinued      08/06/19 2001     IP VTE HIGH RISK PATIENT  Once      08/06/19 2001                Daren Lea MD  Department of Hospital Medicine   [facility]

## 2019-08-08 NOTE — PROGRESS NOTES
Progress Note  Nephrology    Admit Date: 8/6/2019   LOS: 2 days     SUBJECTIVE:     Follow-up For:  ESRD    Feeling much better. MS clear and at baseline per family at bedside.    Review of Systems:  GENERAL: Weak. NO fever, chills, night sweats, decreased intake  HEENT: NO blurry vision, glasses, floaters, hearing defects, sore throat  CV:  NO CP, Palpitations, Edema, Arrhythmias  PULM:  Baseline SOB/ARGUELLO. NO Cough, Hemoptysis, PND, Orthopnea  GI:  NO Nausea, Vomiting, Diarrhea, BRBPR, Melena, Abdominal Pain  :  NO Dysuria, Frequency, Urgency, Hematuria  NEURO: MS clear per pt and family. NO LOC, Seizure activity, Dizziness  SKIN:  NO Rash, Pruritis, Petechiae  MS:  NO Arthralgia, Muscle Aches, Arthritis      OBJECTIVE:     Vital Signs (Most Recent)  Temp: 97.6 °F (36.4 °C) (08/08/19 1112)  Pulse: 100 (08/08/19 1112)  Resp: (!) 21 (08/08/19 1112)  BP: (!) 164/85 (08/08/19 1112)  SpO2: (!) 93 % (08/08/19 1112)    Vital Signs Range (Last 24H):  Temp:  [97.4 °F (36.3 °C)-98.5 °F (36.9 °C)]   Pulse:  []   Resp:  [18-28]   BP: (109-190)/(46-94)   SpO2:  [93 %-98 %]     I/O last 3 completed shifts:  In: 3259 [P.O.:440; I.V.:95; Other:500; IV Piggyback:2224]  Out: 3500 [Other:3500]    Physical Exam:   General: Awake and alert. In no acute distress  HEENT: No scleral icterus, MM moist.   NECK: JVD. Supple,  No swelling, No masses.  CV: Irregular rhthym without murmurs, rubs, gallops.  PULM: Decreasd BS with few rhonchi.  ABD: Soft, nl BS, NT, ND.  EXTR: No edema, clubbing, cyanosis.   NEURO: Non focal and grossly intact.  SKIN: Left inner thigh wound bandaged. No rashes, lesions, ulcers.   MS: No joint effusions.   PSYCH: Normal Mood.    Laboratory:  ..  Lab Results   Component Value Date    WBC 5.48 08/08/2019    HGB 9.7 (L) 08/08/2019    HCT 30.7 (L) 08/08/2019    MCV 81 (L) 08/08/2019     (L) 08/08/2019       Recent Labs   Lab 08/06/19  1747 08/07/19  0216 08/08/19  0326     140 135* 140   K 3.9   3.9 3.2* 3.0*   CL 95  95 95 98   CO2 29  29 25 27   BUN 20  20 19 15   CREATININE 7.2*  7.2* 7.0* 5.9*   *  154* 79 71   CALCIUM 9.7  9.7 8.8 8.8   PHOS  --   --  2.4*     Mg 1.7    Lab Results   Component Value Date    .0 (H) 07/13/2017    CALCIUM 8.8 08/08/2019    CAION 0.84 (L) 07/26/2017    PHOS 2.4 (L) 08/08/2019     Problem List:    Active Hospital Problems    Diagnosis  POA    *Encephalopathy, metabolic  [G92]  Yes    Disorientation [R41.0]  Yes    Encephalomalacia on imaging study [G93.89]  Yes    Hypoglycemia [E16.2]  Yes    Elevated lactic acid level [R79.89]  Yes    Altered mental status [R41.82]  Yes    Pneumonia due to infectious organism [J18.9]  Yes    Atrial fibrillation with RVR [I48.91]  Yes    ESRD (end stage renal disease) [N18.6]  Yes      Resolved Hospital Problems   No resolved problems to display.       Nephrology Assessment/Plan:  1 ESRD  No issues  Volume good  Continue with HD MWF  Monitor lab  Renally dose all appropriate medications     2. HTN  On diltiazem infusion  Variable BP  We will ultrafilter on HD as tolerated     3. HYPOKALEMIA  Use higher K dialysate  Mag ok  Monitor lab    4. ANEMIA  Small drop, near goal  Maintenance DONNY's  Monitor    5. THROMBOCYTOPENIA  New this admit  Check retic, LDH, haptoglobin     4. SECONDARY HYPERPARATHYROIDISM  Phos low  No binders  Monitor    5. ALTERED MENTAL STATUS  Cleared  Defer to Neuro    6. LEG WOUND  Recurrence calciphylaxis?  May need Na thiosulfate  Check PTH  On Abx    Joseph Ingram M.D.

## 2019-08-08 NOTE — PLAN OF CARE
Problem: Diabetes Comorbidity  Goal: Blood Glucose Level Within Desired Range  Outcome: Ongoing (interventions implemented as appropriate)  PATIENT ENCOURAGED TO EAT MEALS, DIETARY CONSULTED TO MEET PATIENTS NEEDS, BLOOD GLUCOSE 98 AT 1200PM

## 2019-08-08 NOTE — PROGRESS NOTES
[facility]  Neurology  Progress Note    Patient Name: Griselda Neely  MRN: [id number]  Admission Date: 8/6/2019  Hospital Length of Stay: 2 days  Code Status: Full Code   Attending Provider: Chalino Victoria MD  Primary Care Physician: Kalie Neely MD   Principal Problem:Encephalopathy, toxic    Subjective:     Interval History: Pt seen and examined. She states that she is feeling well. She is at neurological baseline. No episodes of seizures or confusion reported.     HPI: Griselda Neely is a 73 y.o. female with a history as below who presented to the ED from her cardiologist office via  for evaluation of altered mental status changes.  Sister at bedside reports patient has been in [facility] s/p discharge from hospital ~ 3 weeks ago due to encephalopathy. Pt was started on Keppra and Vimpat during that admission. Sister at bedside further states patient stopped eating and drinking on Friday. Further reports patient with ESRD, had dialysis on Wednesday and Friday but was taken off early. Also reports went to HD on Monday.  Per chart review, patient hospitalized In July 2019 and developed acute encephalopathy during that admission, neurology was consulted with extensive work-up and non-acute findings. On admit, labs and imaging reviewed.  Neurology consulted with recs for MRI/MRA and EEG.    CRT: 7.0  K: 3.2  LDL: 55.2  Ammonia: 18  B12: 1045  TSH: 2.7  AED levels: pending      Brain imaging:  CT head:   No acute intracranial abnormality.     EEG: This is an abnormal awake and drowsy routine EEG due to diffuse slowing of the background activity consistent with a mild to moderate encephalopathy.    Current Neurological Medications: MAR reviewed     Current Facility-Administered Medications   Medication Dose Route Frequency Provider Last Rate Last Dose    0.9%  NaCl infusion   Intravenous PRN Dane Martinez MD        0.9%  NaCl infusion   Intravenous Once Dane Martinez  MD        aspirin EC tablet 81 mg  81 mg Oral Daily JOSE RushP   81 mg at 08/07/19 1026    cefTRIAXone (ROCEPHIN) 2 g in dextrose 5 % 50 mL IVPB  2 g Intravenous Q24H TODD Rush   Stopped at 08/07/19 2016    dextrose 50% injection 12.5 g  12.5 g Intravenous PRN TODD Rush        diltiaZEM 125 mg in dextrose 5% 125 mL infusion (non-titrating)  10 mg/hr Intravenous Continuous TODD Rush   Stopped at 08/07/19 1800    diltiaZEM 24 hr capsule 180 mg  180 mg Oral Daily Ramila Bloom NP   180 mg at 08/07/19 1340    glucagon (human recombinant) injection 1 mg  1 mg Intramuscular PRN TODD Rush        heparin (porcine) injection 5,000 Units  5,000 Units Subcutaneous Q8H TODD Rush   5,000 Units at 08/08/19 0201    insulin aspart U-100 pen 0-5 Units  0-5 Units Subcutaneous Q6H PRN TODD Rush        isosorbide mononitrate 24 hr tablet 30 mg  30 mg Oral Daily Ramila Bloom NP        lacosamide tablet 50 mg  50 mg Oral Daily Chente Ledezma MD        levETIRAcetam tablet 500 mg  500 mg Oral Daily TODD Rush   500 mg at 08/07/19 1025    metoprolol succinate (TOPROL-XL) 24 hr tablet 50 mg  50 mg Oral Daily Ramila Bloom NP        mupirocin 2 % ointment   Nasal BID Dane Martinez MD        sodium chloride 0.9% flush 10 mL  10 mL Intravenous PRN TODD Rush           Review of Systems   Constitutional: Negative.    HENT: Negative.    Eyes: Negative.    Respiratory: Negative.    Cardiovascular: Negative.    Gastrointestinal: Negative.    Endocrine: Negative.    Genitourinary: Negative.    Musculoskeletal: Negative.    Skin: Negative.    Allergic/Immunologic: Negative.    Neurological: Positive for speech difficulty.   Hematological: Negative.    Psychiatric/Behavioral: Negative.      Objective:     Vital Signs (Most Recent):  Temp: 98.5 °F (36.9 °C) (08/08/19 0725)  Pulse: 100 (08/08/19  0725)  Resp: (!) 21 (08/08/19 0725)  BP: 125/86 (08/08/19 0725)  SpO2: (!) 94 % (08/08/19 0725) Vital Signs (24h Range):  Temp:  [97.4 °F (36.3 °C)-98.5 °F (36.9 °C)] 98.5 °F (36.9 °C)  Pulse:  [] 100  Resp:  [18-28] 21  SpO2:  [94 %-97 %] 94 %  BP: (109-158)/(46-94) 125/86     Weight: 55 kg (121 lb 4.1 oz)  Body mass index is 20.18 kg/m².    Physical Exam   Constitutional: She appears well-developed and well-nourished.   HENT:   Head: Normocephalic and atraumatic.   Eyes: Pupils are equal, round, and reactive to light. EOM are normal.   Neck: Normal range of motion.   Cardiovascular: Normal rate and regular rhythm.   Pulmonary/Chest: Effort normal and breath sounds normal.   Abdominal: Soft. Bowel sounds are normal.   Musculoskeletal: Normal range of motion.   Neurological: She is alert. She has normal strength.   Skin: Skin is warm and dry.   Psychiatric: She has a normal mood and affect. Her speech is normal and behavior is normal. Thought content normal.       NEUROLOGICAL EXAMINATION:     MENTAL STATUS   Oriented to person.   Oriented to place.   Disoriented to time.   Speech: speech is normal   Level of consciousness: alert ,  responsive to painful stimuli    CRANIAL NERVES     CN III, IV, VI   Pupils are equal, round, and reactive to light.  Extraocular motions are normal.     MOTOR EXAM     Strength   Strength 5/5 throughout.       Significant Labs: All pertinent lab results from the past 24 hours have been reviewed.    Significant Imaging: I have reviewed and interpreted all pertinent imaging results/findings within the past 24 hours.    Assessment and Plan:   Encephalopathy  -Likely metabolic, multifactorial including dehydration, infection, and hypokalemia  -Back to baseline  -EEG shows encephalopathy but no seizures  -B12, TSH, Folate, Ammonia WNL  -Previous MRI brain done July 2019 negative acute, shows remote ischemia      History of seizures  -Abnormal EEGs on previous admit and pt started on  Vimpat and Keppra  -EEG shows encephalopathy but no seizures  -No clear evidence of further seizures at this time   -Continue Current dose of Keppra   -Decrease Vimpat to 50mg daily and potentially plan to wean off Vimpat in outpatient setting   -F/U AED levels     ESRD  -Nephrology following      Pneumonia   -Management per IM     Seizure education.    No driving for now, no swimming alone, no climbing high areas, no operation of heavy machinery or working with high risk electricity equipment.  Continue to take AEDs as prescribed. If any major side effects from neurological medications go to the ED including mood changes and severe depression.  Patient and/or next of kin informed.  Follow up Neurology in 2 weeks at [phone].  Medication side effects discussed with the patient and/or caregiver.     Active Diagnoses:    Diagnosis Date Noted POA    PRINCIPAL PROBLEM:  Encephalopathy, metabolic  [G92] 08/06/2019 Yes    Disorientation [R41.0] 08/06/2019 Yes    Encephalomalacia on imaging study [G93.89] 08/06/2019 Yes    Hypoglycemia [E16.2] 08/06/2019 Yes    Elevated lactic acid level [R79.89] 08/06/2019 Yes    Altered mental status [R41.82] 08/06/2019 Yes    Pneumonia due to infectious organism [J18.9] 08/06/2019 Yes    Atrial fibrillation with RVR [I48.91] 07/18/2017 Yes    ESRD (end stage renal disease) [N18.6] 05/11/2016 Yes      Problems Resolved During this Admission:       VTE Risk Mitigation (From admission, onward)        Ordered     heparin (porcine) injection 5,000 Units  Every 8 hours      08/06/19 2001     Place ROSANNA hose  Until discontinued      08/06/19 2001     IP VTE HIGH RISK PATIENT  Once      08/06/19 2001          TODD Veliz  Neurology  [facility]

## 2019-08-08 NOTE — PLAN OF CARE
Problem: Adult Inpatient Plan of Care  Goal: Absence of Hospital-Acquired Illness or Injury    Intervention: Prevent Skin Injury  Encouraging patient to turn self in bed which she does with little difficulty. Patient has dressing to posterior left thigh. Patient refusing to left nursing take off dressing, waiting for wound care to assess.   Intervention: Prevent Infection  Patient currently on IV Vancomycin and Rocephin. VS WNL. Patient is anuric. Blood glucose q6hour. Frequent IV checks    Goal: Optimal Comfort and Wellbeing  Outcome: Ongoing (interventions implemented as appropriate)  Intervention: Monitor Pain and Promote Comfort  No c/o pain this shift. Continue to monitor      Problem: Diabetes Comorbidity  Goal: Blood Glucose Level Within Desired Range  Outcome: Ongoing (interventions implemented as appropriate)  Intervention: Maintain Glycemic Control  Blood glucose checks q6hr        Problem: Infection  Goal: Infection Symptom Resolution  Outcome: Ongoing (interventions implemented as appropriate)  Intervention: Prevent or Manage Infection  No fever this shift. Standard precautions      Problem: Fall Injury Risk  Goal: Absence of Fall and Fall-Related Injury  Outcome: Ongoing (interventions implemented as appropriate)  Intervention: Identify and Manage Contributors to Fall Injury Risk  Patient free from injury this shift. Side rails up x 3 and son at bedside. Bed alarm on      Problem: Skin Injury Risk Increased  Goal: Skin Health and Integrity  Outcome: Ongoing (interventions implemented as appropriate)  Intervention: Promote and Optimize Oral Intake  Patient PO intake inadequate. Frequent cues to eat/drink      Problem: Glycemic Control Impaired  Goal: Blood Glucose Level Within Desired Range  Outcome: Ongoing (interventions implemented as appropriate)  Intervention: Optimize Glycemic Control  Blood glucose checks q6hr.      Problem: Oral Intake Inadequate  Goal: Improved Oral Intake  Outcome: Ongoing  (interventions implemented as appropriate)  Intervention: Promote and Optimize Oral Intake  Offer fluids frequently. Fluids encouraged per nursing and family

## 2019-08-08 NOTE — SUBJECTIVE & OBJECTIVE
Interval History:   Mental status is greatly improved.       Objective:     Vital Signs (Most Recent):  Temp: 98 °F (36.7 °C) (08/07/19 1720)  Pulse: 93 (08/07/19 1720)  Resp: 18 (08/07/19 1720)  BP: (!) 145/70 (08/07/19 1720)  SpO2: 97 % (08/07/19 1148) Vital Signs (24h Range):  Temp:  [97.3 °F (36.3 °C)-98.1 °F (36.7 °C)] 98 °F (36.7 °C)  Pulse:  [] 93  Resp:  [16-37] 18  SpO2:  [96 %-100 %] 97 %  BP: (111-159)/(46-98) 145/70     Weight: 50.8 kg (111 lb 15.9 oz)  Body mass index is 18.64 kg/m².    Intake/Output Summary (Last 24 hours) at 8/7/2019 1913  Last data filed at 8/7/2019 1800  Gross per 24 hour   Intake 2709 ml   Output 3500 ml   Net -791 ml          Physical Exam   Constitutional: She appears well-nourished.   HENT:   Head: Normocephalic and atraumatic.   Eyes: Pupils are equal, round, and reactive to light. EOM are normal.   Neck: Normal range of motion. Neck supple.   Cardiovascular:   No murmur heard.  irr irr   Pulmonary/Chest: Effort normal. No respiratory distress.   Abdominal: Soft. Bowel sounds are normal. She exhibits no distension.   Neurological: She is alert.   Skin: Skin is warm and dry. Capillary refill takes less than 2 seconds.   Nursing note and vitals reviewed.      Significant Labs: All pertinent labs within the past 24 hours have been reviewed.    Significant Imaging: I have reviewed all pertinent imaging results/findings within the past 24 hours.

## 2019-08-08 NOTE — PROGRESS NOTES
73 yr old female  Slow speech and moving  Has had pain meds for the dressing change Family at bedside      08/08/19 1821        Wound 08/08/19 1510 Ulceration medial Thigh #1   Date First Assessed/Time First Assessed: 08/08/19 1510   Pre-existing: Yes  Primary Wound Type: Ulceration  Side: Left  Orientation: medial  Location: Thigh  Wound/PI Number (optional): #1   Wound Image     Dressing Appearance Moist drainage   Drainage Amount Small   Drainage Characteristics/Odor Creamy;Serosanguineous   Appearance Red   Tissue loss description Partial thickness   Periwound Area Intact   Wound Edges Irregular   Wound Length (cm) 3 cm   Wound Width (cm) 2 cm   Wound Depth (cm) 1 cm   Wound Volume (cm^3) 6 cm^3   Wound Surface Area (cm^2) 6 cm^2   Care Cleansed with:;Antimicrobial agent;Wound cleanser   Dressing Applied;Calcium alginate;Rolled gauze;Elastic bandage        08/08/19 1821        Wound 08/08/19 1510 Ulceration medial Thigh #2   Date First Assessed/Time First Assessed: 08/08/19 1510   Pre-existing: Yes  Primary Wound Type: Ulceration  Side: Left  Orientation: medial  Location: Thigh  Wound/PI Number (optional): #2   Drainage Amount Small   Drainage Characteristics/Odor Serosanguineous   Appearance Dawn;Red   Periwound Area Denuded   Wound Length (cm) 1 cm   Wound Width (cm) 1 cm   Wound Depth (cm) 0.1 cm   Wound Volume (cm^3) 0.1 cm^3   Wound Surface Area (cm^2) 1 cm^2     Three uclers to the upper inner thigh of the lt leg     recommendations:       Clean with chlorhexdine/ns  Pat dry  Apply hydrogel to wound bed  Cover with Aquacel Ag and cover with foam, kerlex, and light ace wrap every 3 days.

## 2019-08-08 NOTE — PLAN OF CARE
Problem: Fall Injury Risk  Goal: Absence of Fall and Fall-Related Injury  Outcome: Ongoing (interventions implemented as appropriate)  SAFETY PRECAUTIONS, BED ALARM, EDUCATION, NON SKID FOOTWEAR, CALL LIGHT WITHIN REACH, FAMILY AT BEDSIDE. INSTRUCTED PATIENT TO CALL FOR ASSISTANCE FOR TRYING TO GET UP.       home

## 2019-08-08 NOTE — PROGRESS NOTES
[facility]  Cardiology  Progress Note    Patient Name: Griselda Neely  MRN: [id number]  Admission Date: 8/6/2019  Hospital Length of Stay: 2 days  Code Status: Full Code   Attending Physician: Chalino Vcitoria MD   Primary Care Physician: Kalie Neely MD  Expected Discharge Date:   Principal Problem:Encephalopathy, toxic    Subjective:       Interval History: Patient denies any chest pain or shortness of breath. States she does feel some palpitations. Patient had episodes of Atrial Fibrillation with RVR and rate of 130-160s which was treated with Lopressor IV 5 mg and Cardizem drip was resumed. She is hypertensive this morning and BP meds have not been given yet. States she ate breakfast this morning without problems.     ROS     No significant headaches or sore throat or runny nose.   No recent changes in vision.   No recent changes in hearing.  No dysphagia or odynophagia.  Denies  chest pain and shortness of breath at baseline. + palpitations.   Denies any cough or hemoptysis.   Denies any abdominal pain, nausea, vomiting, diarrhea or constipation.   Denies any dysuria or polyuria.   Denies any fevers or chills.   Denies any recent significant weight changes.   Denies bleeding diathesis    Cardiology:  Atrial fibrillation, rates controlled at present on Cardizem drip.   Objective:     Vital Signs (Most Recent):  Temp: 98.5 °F (36.9 °C) (08/08/19 0725)  Pulse: 97 (08/08/19 0951)  Resp: (!) 23 (08/08/19 0951)  BP: (!) 190/82 (08/08/19 0900)  SpO2: 98 % (08/08/19 0951) Vital Signs (24h Range):  Temp:  [97.4 °F (36.3 °C)-98.5 °F (36.9 °C)] 98.5 °F (36.9 °C)  Pulse:  [] 97  Resp:  [18-28] 23  SpO2:  [94 %-98 %] 98 %  BP: (109-190)/(46-94) 190/82     Weight: 55 kg (121 lb 4.1 oz)  Body mass index is 20.18 kg/m².    SpO2: 98 %  O2 Device (Oxygen Therapy): nasal cannula      Intake/Output Summary (Last 24 hours) at 8/8/2019 0956  Last data filed at 8/8/2019 0900  Gross per 24 hour   Intake  1625 ml   Output 3500 ml   Net -1875 ml       Lines/Drains/Airways     Drain                 Hemodialysis AV Fistula 08/08/19 0214 Right upper arm less than 1 day          Peripheral Intravenous Line                 Peripheral IV - Single Lumen 08/06/19 2056 20 G Left;Posterior Forearm 1 day                Scheduled Meds:   sodium chloride 0.9%   Intravenous Once    aspirin  81 mg Oral Daily    cefTRIAXone (ROCEPHIN) IVPB  2 g Intravenous Q24H    diltiaZEM  180 mg Oral Daily    heparin (porcine)  5,000 Units Subcutaneous Q8H    isosorbide mononitrate  30 mg Oral Daily    lacosamide  50 mg Oral Daily    levETIRAcetam  500 mg Oral Daily    metoprolol  5 mg Intravenous Once    metoprolol succinate  50 mg Oral Daily    mupirocin   Nasal BID     Continuous Infusions:   dilTIAZem Stopped (08/07/19 1800)     PRN Meds:.sodium chloride 0.9%, dextrose 50%, glucagon (human recombinant), insulin aspart U-100, sodium chloride 0.9%     Physical Exam     HEENT: Normocephalic, atraumatic, PERRL, Conjunctiva pink, no scleral icterus.   CVS: S1S2+, Irregular, + SM, rubs or gallops, JVP: Normal.  LUNGS: Clear  ABDOMEN: Soft, NT, BS+  EXTREMITIES: No cyanosis, clubbing or edema. Right upper arm fistula + thrill + bruit. Left thigh calciphylaxis ulcer with dressing CDI.   NEURO: AAO X 3.         Significant Labs:   Blood Culture:   Recent Labs   Lab 08/06/19  1810 08/06/19  1821   LABBLOO No Growth to date  No Growth to date No Growth to date  No Growth to date   , BMP:   Recent Labs   Lab 08/06/19  1747 08/07/19 0216 08/08/19  0326   *  154* 79 71     140 135* 140   K 3.9  3.9 3.2* 3.0*   CL 95  95 95 98   CO2 29  29 25 27   BUN 20  20 19 15   CREATININE 7.2*  7.2* 7.0* 5.9*   CALCIUM 9.7  9.7 8.8 8.8   MG  --   --  1.7   , CMP   Recent Labs   Lab 08/06/19  1747 08/07/19  0216 08/08/19  0326     140 135* 140   K 3.9  3.9 3.2* 3.0*   CL 95  95 95 98   CO2 29  29 25 27   *  154* 79 71    BUN 20  20 19 15   CREATININE 7.2*  7.2* 7.0* 5.9*   CALCIUM 9.7  9.7 8.8 8.8   PROT 7.2  7.2 6.4 6.3   ALBUMIN 3.2*  3.2* 2.9* 2.7*   BILITOT 1.2*  1.2* 0.8 0.6   ALKPHOS 54*  54* 49* 51*   AST 18  18 17 15   ALT 8*  8* 6* 6*   ANIONGAP 16  16 15 15   ESTGFRAFRICA 6.0*  6.0* 6.2* 7.6*   EGFRNONAA 5.2*  5.2* 5.4* 6.6*   , CBC   Recent Labs   Lab 08/06/19 1747 08/07/19 0216 08/08/19  0326   WBC 5.24  5.24 5.49 5.48   HGB 11.2*  11.2* 10.5* 9.7*   HCT 36.0*  36.0* 34.3* 30.7*   *  138* 136* 130*   , INR   Recent Labs   Lab 08/06/19 1747   INR 1.1  1.1   , Lipid Panel   Recent Labs   Lab 08/06/19 1747   CHOL 148   HDL 69   LDLCALC 55.2*   TRIG 119   CHOLHDL 46.6   ,   Pathology Results  (Last 10 years)    None      , Troponin   Recent Labs   Lab 08/06/19 1747 08/07/19 0216 08/07/19  1054   TROPONINI 0.092 0.136 0.095*    and All pertinent lab results from the last 24 hours have been reviewed.    Significant Imaging:  I have reviewed the imaging all significant imaging for the last 24 hours.    EEG Impression:      This is an abnormal awake and drowsy routine EEG due to diffuse   slowing of the background activity consistent with a mild to   moderate encephalopathy.    Assessment and Plan:     IMPRESSION:    Altered mental status. Secondary to seizure activity vs hypoglyemia. Improved. Head CT negative. EEG consistent with encephalopathy, negative for seizure activity.   Congestive heart failure. Acute on chronic. LVEF ~40-45% on last echo 7/2019. Euvolemic on exam.   Hypoglycemia. Poor oral intake. Improved.   Calciphlyaxis. Left leg ulcer. Undergoing wound care treatments.   Atrial fibrillation. Valvular. Persistent. Patient does not wish to be on any oral anticoagulation or to be cardioverted. Cardizem drip currently at 5mg/hr. Home meds resumed.   Hypertension. Uncontrolled at present. Lopressor IV given with good response.   Elevated troponin. Trended down. No ischemic symptoms.  Chronic.   s/p MVR via right thoracotomy approach on 3/18/19 with Dr. Lora    Hypokalemia. Persists.   Mildly elevated troponins in the presence of ESRD and recent PNA (troponin 0.05). Chronic.   CAD. Nonobstructive on LHC done on 3/13/2019.   End-stage renal disease on hemodialysis MWF. Anuric. Followed by Dr. Ingram. Tolerated HD yesterday.   Hypocalemia on calcium and Vit D supplements. Now with secondary hyperparathyroidism and calciphylaxis with lower extremity ulcers.   Hyperlipidemia  Chronic tobacco abuse.  COPD on home O2 at 2L    PLAN:      1. Resume Losartan 25 mg PO daily.  2. Wean Cardizem  Drip as able.  3. Continue current management.   4. Patient may be stable for discharge tomorrow once BP and heart rate have stabilized.       Ramila Bloom NP  Cardiology  [facility]    I have personally seen and examined the patient. I reviewed the notes, assessments, and/or procedures performed by Ms Ramila Bloom, I concur with her documentation of Griselda Neely.

## 2019-08-08 NOTE — PT/OT/SLP PROGRESS
Physical Therapy      Patient Name:  Griselda Neely   MRN:  [id number]    Patient not seen today secondary to high HR and high BP per nursing. Will follow-up tomorrow.    Felicia Estes, PTA

## 2019-08-08 NOTE — PROGRESS NOTES
"[facility] Medicine  Progress Note    Patient Name: Griselda Neely  MRN: [id number]  Patient Class: IP- Inpatient   Admission Date: 8/6/2019  Length of Stay: 2 days  Attending Physician: Chalino Victoria MD  Primary Care Provider: Kalie Neely MD        Subjective:     Principal Problem:Encephalopathy, toxic      HPI:  Griselda Neely is a 73 y.o. female with a history as below who presented to the ED from her cardiologist office via  for evaluation of altered mental status changes.  Sister at bedside reports patient has been in [facility] s/p discharge from hospital ~ 3 weeks ago, diagnosis unclear as family states "I know she was started on anti-seizure medications, Keppra and vimpat. Sister at bedside further states patient stopped eating and drinking on Friday.  Further reports patient with ESRD, had dialysis on Wednesday and Friday but was taken off early. Also reports went to HD on Monday.  Per chart review, patient hospitalized In July 2019 and developed acute encephalopathy during that admission, neurology was consulted with extensive work-up and non-acute findings. On admit, labs and imaging reviewed.  Neurology consulted with recs for MRI/MRA and EEG.  Admitted for acute encephalopathy. Nephrology consulted for inpatient HD.           Interval history:  Essentially back to baseline mental status. HR remains uncontrolled and had to be placed back on diltiazem gtt.     Vitals:    08/09/19 2100   BP:    Pulse: 96   Resp:    Temp:        Physical Exam   Constitutional: She is oriented to person, place, and time. No distress.   HENT:   Head: Normocephalic and atraumatic.   Eyes: Pupils are equal, round, and reactive to light. EOM are normal.   Neck: Neck supple.   Cardiovascular: Intact distal pulses. An irregularly irregular rhythm present.   Pulmonary/Chest: Effort normal and breath sounds normal.   Abdominal: Soft. There is no tenderness.   Musculoskeletal: Normal " range of motion.   Neurological: She is alert and oriented to person, place, and time.   Skin: Skin is warm and dry. Capillary refill takes less than 2 seconds.                   Assessment/Plan:      * Encephalopathy, metabolic   AMS  - baseline unclear   Multifactorial - ?metabolic +/- uremia +/- infectious process  +/- hypoglycemia +/- medications induced. Patient with recent admission July 2019 and developed acute encephalopathy at that time. Neurology consulted, extensive work-up completed; started on vimpat and keppra.  CT completed and without acute findings. CXR shows persistence of predominantly ground-glass type pulmonary opacities in the right mid and lower lung zone, ?poss contributory to acute encephalitis  -Continue IV abx   -Blood cultures pending  -Neuro checks q2 x 12 hours   -Neurology consulted  -Given extensive neurology work-up will defer further testing to neurology (MRI/MRA and EEG)  -Continue keppra and vimpat  -IV abx prophylactic pending blood culture results      Pneumonia due to infectious organism  On admit CXR shows persistence of predominantly ground-glass type pulmonary opacities in the right mid and lower lung zone, ?poss contributory to acute encephalitis  -Continue IV abx   -Blood cultures pending      Altered mental status  Etiology unclear. See above      Elevated lactic acid level  Likely metabolic. She is afebrile and without leukocytosis  -Continue IV fluids  -Lactic acid levels q4  -blood cultures pending  -IV abx prophylactic until cultures result         Hypoglycemia  -Accuchecks q6  -Hypoglycemia protocol      Atrial fibrillation with RVR  Patient with PAF on admit telemetry showed a-fib rvr, started on cardizem gtt in ED  -Continuous cardiac monitoring   -Continue cardizem gtt  -Cardiology consult appreciated  -Restart home metoprolol/ASA as well as po cardizem, wean gtt as tolerated    ESRD (end stage renal disease)  Nephrology consulted for inpatient HD        VTE Risk  Mitigation (From admission, onward)        Ordered     heparin (porcine) injection 5,000 Units  Every 8 hours      08/06/19 2001     Place ROSANNA hose  Until discontinued      08/06/19 2001     IP VTE HIGH RISK PATIENT  Once      08/06/19 2001                Daren Lea MD  Department of Hospital Medicine   [facility]

## 2019-08-08 NOTE — PT/OT/SLP PROGRESS
Occupational Therapy      Patient Name:  Griselda Neely   MRN:  [id number]    Patient not seen today secondary to Patient ill (Comment)(pt with HBP (147/117), RN hold; pt on cardizem drip with RVR)). Will follow-up 8/9/2019.    Jany Rivero OT  8/8/2019 12:25 PM

## 2019-08-09 PROBLEM — E16.2 HYPOGLYCEMIA: Status: RESOLVED | Noted: 2019-08-06 | Resolved: 2019-08-09

## 2019-08-09 PROBLEM — I48.91 ATRIAL FIBRILLATION WITH RVR: Status: RESOLVED | Noted: 2017-07-18 | Resolved: 2019-08-09

## 2019-08-09 PROBLEM — R41.0 DISORIENTATION: Status: RESOLVED | Noted: 2019-08-06 | Resolved: 2019-08-09

## 2019-08-09 PROBLEM — R41.82 ALTERED MENTAL STATUS: Status: RESOLVED | Noted: 2019-08-06 | Resolved: 2019-08-09

## 2019-08-09 PROBLEM — R79.89 ELEVATED LACTIC ACID LEVEL: Status: RESOLVED | Noted: 2019-08-06 | Resolved: 2019-08-09

## 2019-08-09 PROBLEM — G92.9 ENCEPHALOPATHY, TOXIC: Status: RESOLVED | Noted: 2019-08-06 | Resolved: 2019-08-09

## 2019-08-09 LAB
ALBUMIN SERPL BCP-MCNC: 2.7 G/DL (ref 3.5–5.2)
ALP SERPL-CCNC: 49 U/L (ref 55–135)
ALT SERPL W/O P-5'-P-CCNC: 5 U/L (ref 10–44)
ANION GAP SERPL CALC-SCNC: 16 MMOL/L (ref 8–16)
AST SERPL-CCNC: 15 U/L (ref 10–40)
BILIRUB SERPL-MCNC: 0.6 MG/DL (ref 0.1–1)
BNP SERPL-MCNC: >4500 PG/ML (ref 0–99)
BUN SERPL-MCNC: 19 MG/DL (ref 8–23)
CALCIUM SERPL-MCNC: 8.7 MG/DL (ref 8.7–10.5)
CHLORIDE SERPL-SCNC: 96 MMOL/L (ref 95–110)
CO2 SERPL-SCNC: 25 MMOL/L (ref 23–29)
CREAT SERPL-MCNC: 7.5 MG/DL (ref 0.5–1.4)
ERYTHROCYTE [DISTWIDTH] IN BLOOD BY AUTOMATED COUNT: 22.5 % (ref 11.5–14.5)
EST. GFR  (AFRICAN AMERICAN): 5.7 ML/MIN/1.73 M^2
EST. GFR  (NON AFRICAN AMERICAN): 4.9 ML/MIN/1.73 M^2
GLUCOSE SERPL-MCNC: 102 MG/DL (ref 70–110)
GLUCOSE SERPL-MCNC: 67 MG/DL (ref 70–110)
GLUCOSE SERPL-MCNC: 75 MG/DL (ref 70–110)
GLUCOSE SERPL-MCNC: 76 MG/DL (ref 70–110)
GLUCOSE SERPL-MCNC: 78 MG/DL (ref 70–110)
GLUCOSE SERPL-MCNC: 92 MG/DL (ref 70–110)
HCT VFR BLD AUTO: 33.1 % (ref 37–48.5)
HGB BLD-MCNC: 10.5 G/DL (ref 12–16)
LDH SERPL L TO P-CCNC: 165 U/L (ref 110–260)
MCH RBC QN AUTO: 25.7 PG (ref 27–31)
MCHC RBC AUTO-ENTMCNC: 31.7 G/DL (ref 32–36)
MCV RBC AUTO: 81 FL (ref 82–98)
PLATELET # BLD AUTO: 140 K/UL (ref 150–350)
PMV BLD AUTO: 10.3 FL (ref 9.2–12.9)
POTASSIUM SERPL-SCNC: 3.1 MMOL/L (ref 3.5–5.1)
PROT SERPL-MCNC: 6.2 G/DL (ref 6–8.4)
PTH-INTACT SERPL-MCNC: 6.4 PG/ML (ref 9–77)
RBC # BLD AUTO: 4.08 M/UL (ref 4–5.4)
RETICS/RBC NFR AUTO: 2.5 % (ref 0.5–2.5)
SODIUM SERPL-SCNC: 137 MMOL/L (ref 136–145)
VANCOMYCIN SERPL-MCNC: 24.6 UG/ML
WBC # BLD AUTO: 5.53 K/UL (ref 3.9–12.7)

## 2019-08-09 PROCEDURE — 83970 ASSAY OF PARATHORMONE: CPT

## 2019-08-09 PROCEDURE — 25000003 PHARM REV CODE 250: Performed by: INTERNAL MEDICINE

## 2019-08-09 PROCEDURE — 27000221 HC OXYGEN, UP TO 24 HOURS

## 2019-08-09 PROCEDURE — 90935 HEMODIALYSIS ONE EVALUATION: CPT

## 2019-08-09 PROCEDURE — 80053 COMPREHEN METABOLIC PANEL: CPT

## 2019-08-09 PROCEDURE — 63600175 PHARM REV CODE 636 W HCPCS

## 2019-08-09 PROCEDURE — 83615 LACTATE (LD) (LDH) ENZYME: CPT

## 2019-08-09 PROCEDURE — 63600175 PHARM REV CODE 636 W HCPCS: Performed by: HOSPITALIST

## 2019-08-09 PROCEDURE — 63600175 PHARM REV CODE 636 W HCPCS: Performed by: INTERNAL MEDICINE

## 2019-08-09 PROCEDURE — 86022 PLATELET ANTIBODIES: CPT

## 2019-08-09 PROCEDURE — 25000003 PHARM REV CODE 250: Performed by: NURSE PRACTITIONER

## 2019-08-09 PROCEDURE — 85045 AUTOMATED RETICULOCYTE COUNT: CPT

## 2019-08-09 PROCEDURE — 21000000 HC CCU ICU ROOM CHARGE

## 2019-08-09 PROCEDURE — 94761 N-INVAS EAR/PLS OXIMETRY MLT: CPT

## 2019-08-09 PROCEDURE — 83880 ASSAY OF NATRIURETIC PEPTIDE: CPT

## 2019-08-09 PROCEDURE — 83010 ASSAY OF HAPTOGLOBIN QUANT: CPT

## 2019-08-09 PROCEDURE — 63600175 PHARM REV CODE 636 W HCPCS: Performed by: NURSE PRACTITIONER

## 2019-08-09 PROCEDURE — 85027 COMPLETE CBC AUTOMATED: CPT

## 2019-08-09 PROCEDURE — 82962 GLUCOSE BLOOD TEST: CPT

## 2019-08-09 PROCEDURE — 36415 COLL VENOUS BLD VENIPUNCTURE: CPT

## 2019-08-09 RX ORDER — ONDANSETRON 2 MG/ML
INJECTION INTRAMUSCULAR; INTRAVENOUS
Status: COMPLETED
Start: 2019-08-09 | End: 2019-08-09

## 2019-08-09 RX ORDER — MORPHINE SULFATE 2 MG/ML
2 INJECTION, SOLUTION INTRAMUSCULAR; INTRAVENOUS ONCE
Status: COMPLETED | OUTPATIENT
Start: 2019-08-09 | End: 2019-08-09

## 2019-08-09 RX ORDER — METOPROLOL SUCCINATE 50 MG/1
50 TABLET, EXTENDED RELEASE ORAL DAILY
Qty: 30 TABLET | Refills: 11 | Status: SHIPPED | OUTPATIENT
Start: 2019-08-10 | End: 2020-06-14 | Stop reason: CLARIF

## 2019-08-09 RX ORDER — SODIUM THIOSULFATE 250 MG/ML
12.5 INJECTION, SOLUTION INTRAVENOUS
Status: DISCONTINUED | OUTPATIENT
Start: 2019-08-09 | End: 2019-08-19 | Stop reason: HOSPADM

## 2019-08-09 RX ORDER — SODIUM THIOSULFATE 250 MG/ML
9 INJECTION, SOLUTION INTRAVENOUS ONCE
Status: DISCONTINUED | OUTPATIENT
Start: 2019-08-09 | End: 2019-08-09

## 2019-08-09 RX ORDER — MORPHINE SULFATE 2 MG/ML
1 INJECTION, SOLUTION INTRAMUSCULAR; INTRAVENOUS ONCE
Status: COMPLETED | OUTPATIENT
Start: 2019-08-09 | End: 2019-08-09

## 2019-08-09 RX ORDER — LACOSAMIDE 50 MG/1
50 TABLET ORAL DAILY
Qty: 30 TABLET | Refills: 11 | Status: SHIPPED | OUTPATIENT
Start: 2019-08-10 | End: 2019-09-29

## 2019-08-09 RX ORDER — CEFDINIR 300 MG/1
300 CAPSULE ORAL DAILY
Status: DISCONTINUED | OUTPATIENT
Start: 2019-08-10 | End: 2019-08-12

## 2019-08-09 RX ORDER — ONDANSETRON 2 MG/ML
4 INJECTION INTRAMUSCULAR; INTRAVENOUS EVERY 6 HOURS PRN
Status: DISCONTINUED | OUTPATIENT
Start: 2019-08-09 | End: 2019-08-10

## 2019-08-09 RX ORDER — CEFDINIR 300 MG/1
300 CAPSULE ORAL DAILY
Qty: 5 CAPSULE | Refills: 0 | Status: SHIPPED | OUTPATIENT
Start: 2019-08-10 | End: 2019-08-20

## 2019-08-09 RX ORDER — DILTIAZEM HYDROCHLORIDE 180 MG/1
180 CAPSULE, COATED, EXTENDED RELEASE ORAL DAILY
Qty: 30 CAPSULE | Refills: 11 | Status: SHIPPED | OUTPATIENT
Start: 2019-08-10 | End: 2019-09-29

## 2019-08-09 RX ORDER — MORPHINE SULFATE 2 MG/ML
INJECTION, SOLUTION INTRAMUSCULAR; INTRAVENOUS
Status: COMPLETED
Start: 2019-08-09 | End: 2019-08-09

## 2019-08-09 RX ADMIN — DILTIAZEM HYDROCHLORIDE 180 MG: 180 CAPSULE, COATED, EXTENDED RELEASE ORAL at 04:08

## 2019-08-09 RX ADMIN — MORPHINE SULFATE 1 MG: 2 INJECTION, SOLUTION INTRAMUSCULAR; INTRAVENOUS at 06:08

## 2019-08-09 RX ADMIN — MORPHINE SULFATE 2 MG: 2 INJECTION, SOLUTION INTRAMUSCULAR; INTRAVENOUS at 07:08

## 2019-08-09 RX ADMIN — ONDANSETRON 4 MG: 2 INJECTION INTRAMUSCULAR; INTRAVENOUS at 03:08

## 2019-08-09 RX ADMIN — HEPARIN SODIUM 5000 UNITS: 5000 INJECTION INTRAVENOUS; SUBCUTANEOUS at 06:08

## 2019-08-09 RX ADMIN — EPOETIN ALFA-EPBX 2800 UNITS: 10000 INJECTION, SOLUTION INTRAVENOUS; SUBCUTANEOUS at 11:08

## 2019-08-09 RX ADMIN — SODIUM THIOSULFATE 12.5 G: 250 INJECTION, SOLUTION INTRAVENOUS at 12:08

## 2019-08-09 RX ADMIN — LEVETIRACETAM 500 MG: 500 TABLET, FILM COATED ORAL at 04:08

## 2019-08-09 NOTE — PLAN OF CARE
Problem: Oral Intake Inadequate  Goal: Improved Oral Intake  Outcome: Ongoing (interventions implemented as appropriate)  Patient continues to have inadequate intake. Recommend starting an appetite stimulant, liberalizing diet to least restrictive diet, and encourage intake of meals and milkshake.

## 2019-08-09 NOTE — PROGRESS NOTES
"[facility]  Adult Nutrition  Progress Note    SUMMARY       Recommendations    NEW:  Recommendation/Intervention: 1. Continue to monitor and encourage adequate PO intake of meals and supplments. 2. Recommend starting an appetite stimulant.   Goals: 1-2. Pateint to meet at least 75% of stimated needs PO. 2. Appetite stimulant to be initiated and appetite to improve if MD and patient agree with recommendations.   Nutrition Goal Status: new, progressing towards goal  Communication of RD Recs: reviewed with RN     Reason for Assessment    Reason For Assessment: RD follow-up  Diagnosis: renal disease  Relevant Medical History: ESRD on HD, encephalopathy   Interdisciplinary Rounds: attended      Nutrition/Diet History    Patient Reported Diet/Restrictions/Preferences: other (see comments)  Typical Food/Fluid Intake: regular diet, very poor PO intake  Food Preferences: frosted flakes and whole milk  Spiritual, Cultural Beliefs, Advent Practices, Values that Affect Care: no  Food Allergies: NKFA  Factors Affecting Nutritional Intake: decreased appetite    Anthropometrics    Temp: 97.1 °F (36.2 °C)  Height Method: Stated  Height: 5' 5" (165.1 cm)  Height (inches): 65 in  Weight Method: Bed Scale  Weight: 53.8 kg (118 lb 9.7 oz)  Weight (lb): 118.61 lb  Ideal Body Weight (IBW), Female: 125 lb  % Ideal Body Weight, Female (lb): 89.59 lb  % Ideal Body Weight Malnutrition: 80-90% - mild deficit  BMI (Calculated): 18.7  BMI Grade: 18.5-24.9 - normal  Weight Loss: unintentional  Usual Body Weight (UBW), k kg  Weight Change Amount: 20 lb(Severe weight loss; >5% in <1 month)  % Usual Body Weight: 84.84  % Weight Change From Usual Weight: -15.33 %       Lab/Procedures/Meds    Pertinent Labs Reviewed: reviewed  Pertinent Labs Comments: Hgb 10.5, Hct 34.3, Na 135, K 3.2, Crea 7.0, GFR 6.2, Alk Phos 49, Alb 2.9, ALT 6   Pertinent Medications Reviewed: reviewed      Estimated/Assessed Needs    Weight Used For " Calorie Calculations: 50.8 kg (111 lb 15.9 oz)(dry weight )  Energy Calorie Requirements (kcal): 1778 - 2032 (35 - 40)  Energy Need Method: Kcal/kg  Protein Requirements: 61 - 76 (1.2 - 1.5 g/kg dry weight)   Weight Used For Protein Calculations: 50.8 kg (111 lb 15.9 oz)  Fluid Requirements (mL): 1000 ml + UOP /day or per MD      RDA Method (mL): 1778         Nutrition Prescription Ordered    Current Diet Order: cardiac, renal   Oral Nutrition Supplement: vanilla milkshake with benecalorie whole milk and 2 ice cream TID     Evaluation of Received Nutrient/Fluid Intake    Energy Calories Required: not meeting needs  Protein Required: not meeting needs  Fluid Required: not meeting needs  Comments: Patient in dialysis at time of RD visit x 2 attempts. Unable to interview patient or family. PO intake remains poor per documentation.  % Intake of Estimated Energy Needs: 0 - 25 %  % Meal Intake: 0 - 25 %    Nutrition Risk    Level of Risk/Frequency of Follow-up: high     Assessment and Plan  RD unable to interview patient x 2 attempts d/t out of room for dialysis. RD to interview patient if/when available to better assess PO intake of meals and benecalorie milkshake. Recommended starting and appetite stimulant and liberalizing diet in hopes to improve oral intake. Due to severe weight loss, lack of appetite, and visible signs of muscle and fat loss; RD may consider recommending enteral nutrition support if patient is unable to meet needs within the next 72 hours. Will follow and make recommendations accordingly.     Monitor and Evaluation    Food and Nutrient Intake: energy intake, food and beverage intake  Food and Nutrient Adminstration: diet order  Physical Activity and Function: nutrition-related ADLs and IADLs  Anthropometric Measurements: weight, weight change  Biochemical Data, Medical Tests and Procedures: electrolyte and renal panel, lipid profile, gastrointestinal profile, other (specify), glucose/endocrine  profile, inflammatory profile  Nutrition-Focused Physical Findings: overall appearance, extremities, muscles and bones, head and eyes, skin     Malnutrition Assessment  Subcutaneous Fat Loss (Final Summary): severe protein-calorie malnutrition  Muscle Loss Evaluation (Final Summary): severe protein-calorie malnutrition    Severe Weight Loss (Malnutrition): greater than 5% in 1 month    Nutrition Follow-Up    RD Follow-up?: Yes    Bel Leo 08/09/2019 3:34 PM

## 2019-08-09 NOTE — PLAN OF CARE
Problem: Adult Inpatient Plan of Care  Goal: Optimal Comfort and Wellbeing  Outcome: Ongoing (interventions implemented as appropriate)  Patient with c/o pain to wound this shift. Monitored frequently and treated as needed    Problem: Diabetes Comorbidity  Goal: Blood Glucose Level Within Desired Range  Outcome: Ongoing (interventions implemented as appropriate)  Blood glucose checks q6hr. WNL this shift.

## 2019-08-09 NOTE — PROGRESS NOTES
"[facility]  Nephrology  Progress Note    Patient Name: Griselda Neely  MRN: [id number]  Admission Date: 8/6/2019  Hospital Length of Stay: 3 days  Attending Provider: Chalino Victoria MD   Primary Care Physician: Kalie Neely MD  Principal Problem:Encephalopathy, toxic      Subjective:     Interval History: "I feel ok" (minimally verbal); denies any specific complaints    Review of patient's allergies indicates:  No Known Allergies  Current Facility-Administered Medications   Medication Frequency    0.9%  NaCl infusion PRN    0.9%  NaCl infusion Once    aspirin EC tablet 81 mg Daily    cefTRIAXone (ROCEPHIN) 2 g in dextrose 5 % 50 mL IVPB Q24H    dextrose 50% injection 12.5 g PRN    diltiaZEM 125 mg in dextrose 5% 125 mL infusion (non-titrating) Continuous    diltiaZEM 24 hr capsule 180 mg Daily    epoetin alfonzo-epbx injection 2,760 Units Every Mon, Wed, Fri    glucagon (human recombinant) injection 1 mg PRN    heparin (porcine) injection 5,000 Units Q8H    insulin aspart U-100 pen 0-5 Units Q6H PRN    isosorbide mononitrate 24 hr tablet 30 mg Daily    lacosamide tablet 50 mg Daily    levETIRAcetam tablet 500 mg Daily    losartan tablet 25 mg Daily    metoprolol injection 5 mg Once    metoprolol succinate (TOPROL-XL) 24 hr tablet 50 mg Daily    mupirocin 2 % ointment BID    sodium chloride 0.9% flush 10 mL PRN       Objective:     Vital Signs (Most Recent):  Temp: 97.3 °F (36.3 °C) (08/08/19 2325)  Pulse: 76 (08/09/19 0247)  Resp: 16 (08/09/19 0247)  BP: 100/66 (08/09/19 0247)  SpO2: 100 % (08/09/19 0401)  O2 Device (Oxygen Therapy): nasal cannula (08/09/19 0151) Vital Signs (24h Range):  Temp:  [97.3 °F (36.3 °C)-98.5 °F (36.9 °C)] 97.3 °F (36.3 °C)  Pulse:  [] 76  Resp:  [16-23] 16  SpO2:  [93 %-100 %] 100 %  BP: (100-190)/(66-86) 100/66     Weight: 55 kg (121 lb 4.1 oz) (08/08/19 0542)  Body mass index is 20.18 kg/m².  Body surface area is 1.59 meters squared.    I/O " last 3 completed shifts:  In: 1290 [P.O.:940; IV Piggyback:350]  Out: -     Physical Exam   Constitutional: No distress.   Chronically ill & somewhat malnourished-appearing   HENT:   Head: Normocephalic and atraumatic.   Mouth/Throat: Oropharynx is clear and moist.   Eyes: Conjunctivae are normal. Right eye exhibits no discharge. Left eye exhibits no discharge. No scleral icterus.   Neck: Normal range of motion. Neck supple. No JVD present.   Cardiovascular: Normal rate. Exam reveals no gallop and no friction rub.   Murmur heard.  Irregularly irregular rhythm   Pulmonary/Chest: Effort normal and breath sounds normal. No respiratory distress. She has no rales.   Abdominal: Soft. She exhibits no distension and no mass. There is no tenderness.   Hypoactive BS in all quadrants   Genitourinary:   Genitourinary Comments: Deferred   Musculoskeletal: Normal range of motion. She exhibits no edema, tenderness or deformity.   R UE AV Fistula w/ palpable thrill   Neurological: She is alert.   Skin: Skin is warm and dry. She is not diaphoretic.   B LE Ulcers (Calciphylaxis)   Psychiatric: Her behavior is normal.   Nursing note and vitals reviewed.      Significant Labs:sure  CBC:   Recent Labs   Lab 08/08/19  0326   WBC 5.48   RBC 3.77*   HGB 9.7*   HCT 30.7*   *   MCV 81*   MCH 25.7*   MCHC 31.6*     CMP:   Recent Labs   Lab 08/08/19  0326   GLU 71   CALCIUM 8.8   ALBUMIN 2.7*   PROT 6.3      K 3.0*   CO2 27   CL 98   BUN 15   CREATININE 5.9*   ALKPHOS 51*   ALT 6*   AST 15   BILITOT 0.6       Significant Imaging:  X-Ray: Reviewed  CT: Reviewed    Assessment/Plan:     Active Diagnoses:    Diagnosis Date Noted POA    PRINCIPAL PROBLEM:  Encephalopathy, metabolic  [G92] 08/06/2019 Yes    Disorientation [R41.0] 08/06/2019 Yes    Encephalomalacia on imaging study [G93.89] 08/06/2019 Yes    Hypoglycemia [E16.2] 08/06/2019 Yes    Elevated lactic acid level [R79.89] 08/06/2019 Yes    Altered mental status [R41.82]  08/06/2019 Yes    Pneumonia due to infectious organism [J18.9] 08/06/2019 Yes    Atrial fibrillation with RVR [I48.91] 07/18/2017 Yes    ESRD (end stage renal disease) [N18.6] 05/11/2016 Yes      Problems Resolved During this Admission:     1 ESRD (HD-MWF via R UE AV Fistula)- clinically stable at present; no overt volume overload, uremic signs/symptoms, electrolyte perturbations nor acid-base disturbance; no active issues    -maintenance HD (duration: 3h; UF Goal: 2-3L as tolerated; 3K Electrolyte Standard 'bath'; Access: AVF; Qb: 400 ml/min, Qd: 2x BFR; 12.5 gm of Na thiosulfate over last 30-60 min of treatment))    -DAILY renal function panel to document sCr trend, optimize electrolyte 'bath' & assess response to therapy    -avoid nephrotoxic agents (NSAIDs, IV contrast dye, MRI w/ Gadolinium constrast)    -renally dose all appropriate medications, including antibiotics     2. HTN- clinically stable at present; BP AT GOAL (100/66); no active issues    -continue current anti-HTN drug regimen (GIVE BP MEDS POST HD on HD Days) +/- HD-associated UF     3. ANEMIA- clinically stable at present; H/H AT GOAL (9.7/30.7 on 8/8/19); no active issues    -trend DAILY CBC (H/H) to effect optimal blood-loss surveillance     4. SECONDARY HYPERPARATHYROIDISM (sHPT)-- clinically stable on current therapy (Na thiosulfate 12.5 gm x last 30-60 min of HD); no active issues    -continue dietary binder/Vitamin D +/- HD-associated calcimimetic therapy (Cinacalcet vs Etelcalcetide)    Thank you for your consult. I will follow-up with patient. Please contact us if you have any additional questions.    Dane Martinez III, MD  Nephrology  [facility]

## 2019-08-09 NOTE — PLAN OF CARE
08/09/19 1235   Discharge Reassessment   Assessment Type Discharge Planning Reassessment   Anticipated Discharge Disposition Home-Health   Consult for  rec'd.  Partial referral sent to [facility] via Epic fax.

## 2019-08-09 NOTE — NURSING
Dr. Powell on unit for rounds. Patient heart rate elevated r/t pain level. Ace bandage and kerlex on wound removed per Dr. Powell. Patient states instant mild relief. MD order 2mg IVP morphine x 1 now. Report given to Harish HANSEN, Harish to give morphine.

## 2019-08-09 NOTE — PT/OT/SLP PROGRESS
Physical Therapy      Patient Name:  Griselda Neely   MRN:  [id number]    Patient not seen today secondary to Dialysis. Will follow-up tomorrow.    Felicia Estes, PTA

## 2019-08-09 NOTE — NURSING
PT. RETURNED TO UNIT FROM DIALYSIS WHERE 2.8 LITERS FLUID REMOVED. PT IS C/O NAUSEA. DR. CALVO CALLED AND ORDERED ZOFRAN 4MG IVP Q6HR PRN. PT. GIVEN DOSE AND FEELING BETTER. WILL CONTINUE TO MONITOR

## 2019-08-09 NOTE — NURSING
FAMILY HAS ISSUE WITH PT. BEING DISCHARGED HOME TODAY AFTER DIALYSIS. PT. HR RANGING FROM 110  BPM DUE TO NOT RECEIVING MORNING MEDS BECAUSE OF DIALYSIS. CARDIZEM AND KEEPA WERE GIVEN PO UPON RETURNING TO THE UNIT AFTER IV ZOFRAN GIVEN FOR NAUSEA. FAMILY REQUEST TO SPEAK TO DR. CALVO ABOUT DISCHARGE. DR. CALVO WAS PAIGED.

## 2019-08-09 NOTE — NURSING
Patient c/o pain to wound site. No PRN medications ordered. Dr. Velasquez notified. PO Norco 7.5mg x 1 ordered. Will give and continue to monitor.

## 2019-08-09 NOTE — NURSING
Patient appeared a sleep for 1 hour then awoke with c/o pain at wound site. Dr. Velasquez notified. Morphine IV 1mg x 1 dose ordered. Given. Will continue to monitor.

## 2019-08-09 NOTE — PROGRESS NOTES
[facility]  Cardiology  Progress Note    Patient Name: Griselda Neely  MRN: [id number]  Admission Date: 8/6/2019  Hospital Length of Stay: 3 days  Code Status: Full Code   Attending Physician: Chalino Victoria MD   Primary Care Physician: Kalie Neely MD  Expected Discharge Date:   Principal Problem:Encephalopathy, toxic    Subjective:       Interval History: Patient denies any chest pain. She denies any shortness of breath. Overnight she started having severe pain in the left thigh after wound care was done yesterday. She received 1 mg of morphine and it did not help. She had an Ace wrap around the thigh. I took off the Ace wrap and the bandage around the site and the patient stated she felt better.    ROS   Reports some nausea.  Denies any chest pain or shortness of breath.  Denies any seizure activity.    Cardiology:  Atrial fibrillation, rates controlled at present on Cardizem drip.   Objective:     Vital Signs (Most Recent):  Temp: 97.3 °F (36.3 °C) (08/08/19 2325)  Pulse: 76 (08/09/19 0247)  Resp: 16 (08/09/19 0247)  BP: 100/66 (08/09/19 0247)  SpO2: 100 % (08/09/19 0401) Vital Signs (24h Range):  Temp:  [97.3 °F (36.3 °C)-98.5 °F (36.9 °C)] 97.3 °F (36.3 °C)  Pulse:  [] 76  Resp:  [16-23] 16  SpO2:  [93 %-100 %] 100 %  BP: (100-190)/(66-86) 100/66     Weight: 55 kg (121 lb 4.1 oz)  Body mass index is 20.18 kg/m².    SpO2: 100 %  O2 Device (Oxygen Therapy): nasal cannula      Intake/Output Summary (Last 24 hours) at 8/9/2019 0654  Last data filed at 8/8/2019 1412  Gross per 24 hour   Intake 740 ml   Output --   Net 740 ml       Lines/Drains/Airways     Drain                 Hemodialysis AV Fistula 08/08/19 0214 Right upper arm 1 day          Peripheral Intravenous Line                 Peripheral IV - Single Lumen 08/06/19 2056 20 G Left;Posterior Forearm 2 days                Scheduled Meds:   sodium chloride 0.9%   Intravenous Once    aspirin  81 mg Oral Daily    cefTRIAXone  (ROCEPHIN) IVPB  2 g Intravenous Q24H    diltiaZEM  180 mg Oral Daily    epoetin alfonzo-ebpx (RETACRIT) injection  50 Units/kg Intravenous Every Mon, Wed, Fri    heparin (porcine)  5,000 Units Subcutaneous Q8H    isosorbide mononitrate  30 mg Oral Daily    lacosamide  50 mg Oral Daily    levETIRAcetam  500 mg Oral Daily    losartan  25 mg Oral Daily    metoprolol  5 mg Intravenous Once    metoprolol succinate  50 mg Oral Daily    mupirocin   Nasal BID     Continuous Infusions:   dilTIAZem Stopped (08/08/19 2200)     PRN Meds:.sodium chloride 0.9%, dextrose 50%, glucagon (human recombinant), insulin aspart U-100, sodium chloride 0.9%     Physical Exam     HEENT: Normocephalic, atraumatic, PERRL, Conjunctiva pink, no scleral icterus.   CVS: S1S2+, Irregular, + SM, rubs or gallops, JVP: Normal.  LUNGS: Clear  ABDOMEN: Soft, NT, BS+  EXTREMITIES: No cyanosis, clubbing or edema. Right upper arm fistula + thrill + bruit. Left thigh calciphylaxis ulcer with dressing CDI.   NEURO: AAO X 3.         Significant Labs:   Blood Culture:   No results for input(s): LABBLOO in the last 48 hours., BMP:   Recent Labs   Lab 08/08/19  0326   GLU 71      K 3.0*   CL 98   CO2 27   BUN 15   CREATININE 5.9*   CALCIUM 8.8   MG 1.7   , CMP   Recent Labs   Lab 08/08/19  0326      K 3.0*   CL 98   CO2 27   GLU 71   BUN 15   CREATININE 5.9*   CALCIUM 8.8   PROT 6.3   ALBUMIN 2.7*   BILITOT 0.6   ALKPHOS 51*   AST 15   ALT 6*   ANIONGAP 15   ESTGFRAFRICA 7.6*   EGFRNONAA 6.6*   , CBC   Recent Labs   Lab 08/08/19  0326   WBC 5.48   HGB 9.7*   HCT 30.7*   *   , INR   No results for input(s): INR, PROTIME in the last 48 hours., Lipid Panel   No results for input(s): CHOL, HDL, LDLCALC, TRIG, CHOLHDL in the last 48 hours.,   Pathology Results  (Last 10 years)    None      , Troponin   Recent Labs   Lab 08/07/19  1054   TROPONINI 0.095*    and All pertinent lab results from the last 24 hours have been  reviewed.    Significant Imaging:  I have reviewed the imaging all significant imaging for the last 24 hours.    EEG Impression:      This is an abnormal awake and drowsy routine EEG due to diffuse   slowing of the background activity consistent with a mild to   moderate encephalopathy.    Assessment and Plan:     IMPRESSION:  Altered mental status. Secondary to seizure activity vs hypoglyemia. Improved. Head CT negative. EEG consistent with encephalopathy, negative for seizure activity.   Congestive heart failure. Acute on chronic. LVEF ~40-45% on last echo 7/2019. Euvolemic on exam.   Hypoglycemia. Poor oral intake. Improved.   Calciphlyaxis. Left leg ulcer. Undergoing wound care treatments. Currently reports severe pain.  Atrial fibrillation. Valvular. Persistent. Patient does not wish to be on any oral anticoagulation or to be cardioverted. Cardizem drip currently at 5mg/hr. Home meds resumed.   Hypertension. Uncontrolled at present. Lopressor IV given with good response.   Elevated troponin. Trended down. No ischemic symptoms. Chronic.   s/p MVR via right thoracotomy approach on 3/18/19 with Dr. Lora    Hypokalemia. Persists.   Mildly elevated troponins in the presence of ESRD and recent PNA (troponin 0.05). Chronic.   CAD. Nonobstructive on LHC done on 3/13/2019.   End-stage renal disease on hemodialysis MWF. Anuric. Followed by Dr. Ingram. Tolerated HD yesterday.   Hypocalemia on calcium and Vit D supplements. Now with secondary hyperparathyroidism and calciphylaxis with lower extremity ulcers.   Hyperlipidemia  Chronic tobacco abuse.  COPD on home O2 at 2L    PLAN:  1. We'll give her morphine 2 mg ×1 dose.  2. Continue current medical regimen from the cardiac standpoint for now.  3. If the patient remains stable and pain issues at that quickly resolved, she could be discharged later today after dialysis on over the weekend.      Katharina Powell MD  Cardiology  [facility]    I have  personally seen and examined the patient. I reviewed the notes, assessments, and/or procedures performed by Ms Ramila Bloom, I concur with her documentation of Griselda Neely.

## 2019-08-09 NOTE — PT/OT/SLP PROGRESS
Occupational Therapy      Patient Name:  Griselda Neely   MRN:  [id number]    Patient not seen today secondary to Dialysis. OT attempted x2. Will follow-up next service date.    Medina Dietrich OT  8/9/2019

## 2019-08-10 LAB
ANION GAP SERPL CALC-SCNC: 21 MMOL/L (ref 8–16)
BASOPHILS # BLD AUTO: 0.01 K/UL (ref 0–0.2)
BASOPHILS NFR BLD: 0.1 % (ref 0–1.9)
BUN SERPL-MCNC: 12 MG/DL (ref 8–23)
CALCIUM SERPL-MCNC: 9.4 MG/DL (ref 8.7–10.5)
CHLORIDE SERPL-SCNC: 98 MMOL/L (ref 95–110)
CO2 SERPL-SCNC: 26 MMOL/L (ref 23–29)
CREAT SERPL-MCNC: 5.6 MG/DL (ref 0.5–1.4)
DIFFERENTIAL METHOD: ABNORMAL
EOSINOPHIL # BLD AUTO: 0 K/UL (ref 0–0.5)
EOSINOPHIL NFR BLD: 0.6 % (ref 0–8)
ERYTHROCYTE [DISTWIDTH] IN BLOOD BY AUTOMATED COUNT: 22.8 % (ref 11.5–14.5)
EST. GFR  (AFRICAN AMERICAN): 8.1 ML/MIN/1.73 M^2
EST. GFR  (NON AFRICAN AMERICAN): 7 ML/MIN/1.73 M^2
GLUCOSE SERPL-MCNC: 57 MG/DL (ref 70–110)
GLUCOSE SERPL-MCNC: 69 MG/DL (ref 70–110)
GLUCOSE SERPL-MCNC: 70 MG/DL (ref 70–110)
GLUCOSE SERPL-MCNC: 73 MG/DL (ref 70–110)
GLUCOSE SERPL-MCNC: 74 MG/DL (ref 70–110)
GLUCOSE SERPL-MCNC: 79 MG/DL (ref 70–110)
HAPTOGLOB SERPL-MCNC: 109 MG/DL (ref 34–200)
HCT VFR BLD AUTO: 33 % (ref 37–48.5)
HGB BLD-MCNC: 10.4 G/DL (ref 12–16)
IMM GRANULOCYTES # BLD AUTO: 0.03 K/UL (ref 0–0.04)
IMM GRANULOCYTES NFR BLD AUTO: 0.4 % (ref 0–0.5)
LYMPHOCYTES # BLD AUTO: 2 K/UL (ref 1–4.8)
LYMPHOCYTES NFR BLD: 27 % (ref 18–48)
MAGNESIUM SERPL-MCNC: 1.9 MG/DL (ref 1.6–2.6)
MCH RBC QN AUTO: 25.9 PG (ref 27–31)
MCHC RBC AUTO-ENTMCNC: 31.5 G/DL (ref 32–36)
MCV RBC AUTO: 82 FL (ref 82–98)
MONOCYTES # BLD AUTO: 1 K/UL (ref 0.3–1)
MONOCYTES NFR BLD: 13.6 % (ref 4–15)
NEUTROPHILS # BLD AUTO: 4.2 K/UL (ref 1.8–7.7)
NEUTROPHILS NFR BLD: 58.3 % (ref 38–73)
NRBC BLD-RTO: 1 /100 WBC
PF4 HEPARIN CMPLX IGG SERPL IA: 0.26 OD (ref 0–0.4)
PLATELET # BLD AUTO: 150 K/UL (ref 150–350)
PMV BLD AUTO: 10 FL (ref 9.2–12.9)
POTASSIUM SERPL-SCNC: 3.5 MMOL/L (ref 3.5–5.1)
RBC # BLD AUTO: 4.01 M/UL (ref 4–5.4)
SODIUM SERPL-SCNC: 145 MMOL/L (ref 136–145)
VANCOMYCIN SERPL-MCNC: 18.4 UG/ML
WBC # BLD AUTO: 7.27 K/UL (ref 3.9–12.7)

## 2019-08-10 PROCEDURE — 63600175 PHARM REV CODE 636 W HCPCS: Performed by: INTERNAL MEDICINE

## 2019-08-10 PROCEDURE — 94761 N-INVAS EAR/PLS OXIMETRY MLT: CPT

## 2019-08-10 PROCEDURE — 80202 ASSAY OF VANCOMYCIN: CPT

## 2019-08-10 PROCEDURE — 25000003 PHARM REV CODE 250: Performed by: INTERNAL MEDICINE

## 2019-08-10 PROCEDURE — 83735 ASSAY OF MAGNESIUM: CPT

## 2019-08-10 PROCEDURE — 25000003 PHARM REV CODE 250: Performed by: PSYCHIATRY & NEUROLOGY

## 2019-08-10 PROCEDURE — 85025 COMPLETE CBC W/AUTO DIFF WBC: CPT

## 2019-08-10 PROCEDURE — 21000000 HC CCU ICU ROOM CHARGE

## 2019-08-10 PROCEDURE — 63600175 PHARM REV CODE 636 W HCPCS: Performed by: NURSE PRACTITIONER

## 2019-08-10 PROCEDURE — 36415 COLL VENOUS BLD VENIPUNCTURE: CPT

## 2019-08-10 PROCEDURE — 25000003 PHARM REV CODE 250: Performed by: NURSE PRACTITIONER

## 2019-08-10 PROCEDURE — 80048 BASIC METABOLIC PNL TOTAL CA: CPT

## 2019-08-10 RX ORDER — ONDANSETRON 4 MG/1
8 TABLET, ORALLY DISINTEGRATING ORAL EVERY 6 HOURS
Status: DISCONTINUED | OUTPATIENT
Start: 2019-08-10 | End: 2019-08-11

## 2019-08-10 RX ORDER — ONDANSETRON 4 MG/1
4 TABLET, ORALLY DISINTEGRATING ORAL ONCE
Status: COMPLETED | OUTPATIENT
Start: 2019-08-10 | End: 2019-08-10

## 2019-08-10 RX ORDER — TRAMADOL HYDROCHLORIDE 50 MG/1
50 TABLET ORAL ONCE
Status: DISCONTINUED | OUTPATIENT
Start: 2019-08-10 | End: 2019-08-10

## 2019-08-10 RX ORDER — VANCOMYCIN HCL IN 5 % DEXTROSE 1G/250ML
1000 PLASTIC BAG, INJECTION (ML) INTRAVENOUS ONCE
Status: DISCONTINUED | OUTPATIENT
Start: 2019-08-10 | End: 2019-08-10

## 2019-08-10 RX ADMIN — LOSARTAN POTASSIUM 25 MG: 25 TABLET, FILM COATED ORAL at 09:08

## 2019-08-10 RX ADMIN — DILTIAZEM HYDROCHLORIDE 180 MG: 180 CAPSULE, COATED, EXTENDED RELEASE ORAL at 09:08

## 2019-08-10 RX ADMIN — HEPARIN SODIUM 5000 UNITS: 5000 INJECTION INTRAVENOUS; SUBCUTANEOUS at 09:08

## 2019-08-10 RX ADMIN — ISOSORBIDE MONONITRATE 30 MG: 30 TABLET, EXTENDED RELEASE ORAL at 09:08

## 2019-08-10 RX ADMIN — LEVETIRACETAM 500 MG: 500 TABLET, FILM COATED ORAL at 09:08

## 2019-08-10 RX ADMIN — METOPROLOL SUCCINATE 50 MG: 50 TABLET, FILM COATED, EXTENDED RELEASE ORAL at 09:08

## 2019-08-10 RX ADMIN — MUPIROCIN: 20 OINTMENT TOPICAL at 10:08

## 2019-08-10 RX ADMIN — HEPARIN SODIUM 5000 UNITS: 5000 INJECTION INTRAVENOUS; SUBCUTANEOUS at 01:08

## 2019-08-10 RX ADMIN — ASPIRIN 81 MG: 81 TABLET, COATED ORAL at 09:08

## 2019-08-10 RX ADMIN — ONDANSETRON 4 MG: 2 INJECTION INTRAMUSCULAR; INTRAVENOUS at 03:08

## 2019-08-10 RX ADMIN — ONDANSETRON 8 MG: 4 TABLET, ORALLY DISINTEGRATING ORAL at 10:08

## 2019-08-10 RX ADMIN — ONDANSETRON 4 MG: 4 TABLET, ORALLY DISINTEGRATING ORAL at 04:08

## 2019-08-10 RX ADMIN — CEFDINIR 300 MG: 300 CAPSULE ORAL at 09:08

## 2019-08-10 RX ADMIN — LACOSAMIDE 50 MG: 50 TABLET, FILM COATED ORAL at 09:08

## 2019-08-10 NOTE — PROGRESS NOTES
"[facility] Medicine  Progress Note    Patient Name: Griselda Neely  MRN: [id number]  Patient Class: IP- Inpatient   Admission Date: 8/6/2019  Length of Stay: 3 days  Attending Physician: Chalino Victoria MD  Primary Care Provider: Kalie Neely MD        Subjective:     Principal Problem:Encephalopathy, toxic      HPI:  Griselda Neely is a 73 y.o. female with a history as below who presented to the ED from her cardiologist office via  for evaluation of altered mental status changes.  Sister at bedside reports patient has been in [facility] s/p discharge from hospital ~ 3 weeks ago, diagnosis unclear as family states "I know she was started on anti-seizure medications, Keppra and vimpat. Sister at bedside further states patient stopped eating and drinking on Friday.  Further reports patient with ESRD, had dialysis on Wednesday and Friday but was taken off early. Also reports went to HD on Monday.  Per chart review, patient hospitalized In July 2019 and developed acute encephalopathy during that admission, neurology was consulted with extensive work-up and non-acute findings. On admit, labs and imaging reviewed.  Neurology consulted with recs for MRI/MRA and EEG.  Admitted for acute encephalopathy. Nephrology consulted for inpatient HD.           Interval history:  This am, HR well controlled and feeling back to her usual self. Plan was to d/c home after completion of HD, however after dialysis she was complaining of severe nausea and HR was uncontrolled as she had not received her am rate control meds.     Vitals:    08/09/19 2100   BP:    Pulse: 96   Resp:    Temp:        Physical Exam   Constitutional: She is oriented to person, place, and time. No distress.   HENT:   Head: Normocephalic and atraumatic.   Eyes: Pupils are equal, round, and reactive to light. EOM are normal.   Neck: Neck supple.   Cardiovascular: Intact distal pulses. An irregularly irregular rhythm " present.   Pulmonary/Chest: Effort normal and breath sounds normal.   Abdominal: Soft. There is no tenderness.   Musculoskeletal: Normal range of motion.   Neurological: She is alert and oriented to person, place, and time.   Skin: Skin is warm and dry. Capillary refill takes less than 2 seconds.                   Assessment/Plan:      * Encephalopathy, metabolic   AMS  - baseline unclear   Multifactorial - ?metabolic +/- uremia +/- infectious process  +/- hypoglycemia +/- medications induced. Patient with recent admission July 2019 and developed acute encephalopathy at that time. Neurology consulted, extensive work-up completed; started on vimpat and keppra.  CT completed and without acute findings. CXR shows persistence of predominantly ground-glass type pulmonary opacities in the right mid and lower lung zone, ?poss contributory to acute encephalitis  -Continue IV abx   -Blood cultures   -Neurology consulted  -Given extensive neurology work-up will defer further testing to neurology (MRI/MRA and EEG)  -Continue keppra and vimpat        Pneumonia due to infectious organism  On admit CXR shows persistence of predominantly ground-glass type pulmonary opacities in the right mid and lower lung zone, ?poss contributory to acute encephalitis  -Continue IV abx         Altered mental status  Etiology unclear. See above      Hypoglycemia  -Accuchecks q6  -Hypoglycemia protocol      Atrial fibrillation with RVR  Patient with PAF on admit telemetry showed a-fib rvr, started on cardizem gtt in ED  -Continuous cardiac monitoring   -Continue cardizem gtt  -Cardiology consult appreciated  -Restart home metoprolol/ASA as well as po cardizem, wean gtt as tolerated    ESRD (end stage renal disease)  Nephrology consulted for inpatient HD        VTE Risk Mitigation (From admission, onward)        Ordered     heparin (porcine) injection 5,000 Units  Every 8 hours      08/06/19 2001     Place ROSANNA hose  Until discontinued      08/06/19  2001     IP VTE HIGH RISK PATIENT  Once      08/06/19 2001                Daren Lea MD  Department of Hospital Medicine   [facility]

## 2019-08-10 NOTE — PROGRESS NOTES
Pharmacokinetic Assessment Follow Up: IV Vancomycin    Vancomycin serum concentration assessment(s):    The random level was drawn correctly and can be used to guide therapy at this time. The measurement is within the desired definitive target range of 15 to 20 mcg/mL.    Vancomycin Regimen Plan:    Re-dose when the random level is less than 20 mcg/mL, next level to be drawn at 04:30 (w/ AM labs) on 8/13/19     Drug levels (last 3 results):  Recent Labs   Lab Result Units 08/07/19  2240 08/08/19  2359 08/10/19  0324   Vancomycin, Random ug/mL 10.7 24.6 18.4       Pharmacy will continue to follow and monitor vancomycin.    Please contact pharmacy at extension [phone] for questions regarding this assessment.    Thank you for the consult,   Alicia Villagomez       Patient brief summary:  Griselda Neely is a 73 y.o. female initiated on antimicrobial therapy with IV Vancomycin for treatment of bacteremia    The patient's current regimen is Vanco 1000mg IV Once; Intermittent Dosing    Drug Allergies:   Review of patient's allergies indicates:  No Known Allergies    Actual Body Weight:   53.8 kg    Renal Function:   Estimated Creatinine Clearance: 7.7 mL/min (A) (based on SCr of 5.6 mg/dL (H)).,     Dialysis Method (if applicable):  intermittent HD (MWF)    CBC (last 72 hours):  Recent Labs   Lab Result Units 08/08/19  0326 08/09/19  1100 08/10/19  0324   WBC K/uL 5.48 5.53 7.27   Hemoglobin g/dL 9.7* 10.5* 10.4*   Hematocrit % 30.7* 33.1* 33.0*   Platelets K/uL 130* 140* 150   Gran% % 66.6  --  58.3   Lymph% % 16.4*  --  27.0   Mono% % 14.6  --  13.6   Eosinophil% % 1.6  --  0.6   Basophil% % 0.4  --  0.1   Differential Method  Automated  --  Automated       Metabolic Panel (last 72 hours):  Recent Labs   Lab Result Units 08/08/19  0326 08/09/19  0752 08/10/19  0324   Sodium mmol/L 140 137 145   Potassium mmol/L 3.0* 3.1* 3.5   Chloride mmol/L 98 96 98   CO2 mmol/L 27 25 26   Glucose mg/dL 71 67* 69*   BUN, Bld mg/dL 15 19 12    Creatinine mg/dL 5.9* 7.5* 5.6*   Albumin g/dL 2.7* 2.7*  --    Total Bilirubin mg/dL 0.6 0.6  --    Alkaline Phosphatase U/L 51* 49*  --    AST U/L 15 15  --    ALT U/L 6* 5*  --    Magnesium mg/dL 1.7  --  1.9   Phosphorus mg/dL 2.4*  --   --        Vancomycin Administrations:  vancomycin given in the last 96 hours        No antibiotic orders with administrations found.                      Microbiologic Results:  Microbiology Results (last 7 days)       Procedure Component Value Units Date/Time    Blood culture [[id number]] Collected:  08/06/19 1810    Order Status:  Completed Specimen:  Blood Updated:  08/09/19 2232     Blood Culture, Routine No Growth to date      No Growth to date      No Growth to date      No Growth to date    Narrative:       Aerobic bottle ONLY received    Blood culture [[id number]] Collected:  08/06/19 1821    Order Status:  Completed Specimen:  Blood Updated:  08/09/19 2032     Blood Culture, Routine No Growth to date      No Growth to date      No Growth to date      No Growth to date

## 2019-08-10 NOTE — PT/OT/SLP PROGRESS
"Occupational Therapy      Patient Name:  Griselda Neely   MRN:  [id number]    Patient not seen today secondary to Patient unwilling to participate(2 attempts to see the patient today; 1st attempt @ 1140 with family not wanting pt to be woken up; 2nd attmept pt refused; "I don't feel like it; I don't feel good" @ 1420).     Jany Rivero OT  8/10/2019  "

## 2019-08-10 NOTE — PT/OT/SLP PROGRESS
"Physical Therapy      Patient Name:  Griselda Neely   MRN:  [id number]    Patient not seen today secondary to Patient unwilling to participate, Other (Comment)(Pt refuses stating "I just don't feel like it"). Will follow-up 8/11/2019.    Tamiko Acosta PTA    "

## 2019-08-10 NOTE — PROGRESS NOTES
Progress Note  Nephrology    Admit Date: 8/6/2019   LOS: 4 days     SUBJECTIVE:     Follow-up For:  ESRD    States she does not feel well in general  Refusing PT at bedside  Reports vomiting 2 x with nausea unwitnessed by nurse and aide  Emaciated, uncooperative but oriented  B/p elevated reports headaches    Review of Systems:  GENERAL: NO fever, chills, night sweats  HEENT: NO blurry vision, glasses, floaters, hearing defects, sore throat  CV:  NO CP, Palpitations, Edema, Arrhythmias  PULM:  NO Cough, SOB, ARGUELLO, Hemoptysis, PND, Orthopnea  GI:  Positive reported Nausea, Vomiting; No Diarrhea, BRBPR, Melena, Abdominal Pain  :  NO Dysuria, Frequency, Urgency, Hematuria  NEURO: NO LOC, Seizure activity, Dizziness  SKIN:  NO Rash, Pruritis, Petechiae  MS:  NO Arthralgia, Muscle Aches, Arthritis  Right arm access with dressing dry intact  Anuric  Decreased appetite    OBJECTIVE:     Vital Signs (Most Recent)  Temp: 97.9 °F (36.6 °C) (08/10/19 0320)  Pulse: (!) 159 (08/10/19 0320)  Resp: (!) 22 (08/09/19 1901)  BP: 127/83 (08/10/19 0320)  SpO2: 97 % (08/10/19 0401)    Vital Signs Range (Last 24H):  Temp:  [96.6 °F (35.9 °C)-97.9 °F (36.6 °C)]   Pulse:  []   Resp:  [18-22]   BP: (127-192)/(65-99)   SpO2:  [88 %-100 %]     I/O last 3 completed shifts:  In: 828.6 [P.O.:200; I.V.:28.6; Other:550; IV Piggyback:50]  Out: 3507 [Other:3507]    Physical Exam:   General: Awake and alert. In no acute distress  HEENT: No scleral icterus, MM dry  NECK: No JVD. Supple,  No swelling, No masses.  CV: Regular rate and rhthym without murmurs, rubs, gallops.  PULM: Clear without crackles, rhonchi, wheezes. Diminshed bilat bases  ABD: Soft, nl BS, NT, ND.  EXTR: No edema, clubbing, cyanosis.   NEURO: Non focal and grossly intact.  SKIN: No rashes, lesions, ulcers.   MS: No joint effusions.   PSYCH: Agitated    Laboratory:  Recent Labs   Lab 08/10/19  0324   HGB 10.4*   HCT 33.0*   WBC 7.27       Recent Labs   Lab 08/08/19  0326  08/09/19  0752 08/10/19  0324    137 145   K 3.0* 3.1* 3.5   CL 98 96 98   CO2 27 25 26   BUN 15 19 12   CREATININE 5.9* 7.5* 5.6*   GLU 71 67* 69*   CALCIUM 8.8 8.7 9.4   PHOS 2.4*  --   --        Lab Results   Component Value Date    PTH 6.4 (L) 08/09/2019    CALCIUM 9.4 08/10/2019    CAION 0.84 (L) 07/26/2017    PHOS 2.4 (L) 08/08/2019       No results found for: URICACID    BNP  Recent Labs   Lab 08/09/19  0752   BNP >4,500*         Problem List:    Active Hospital Problems    Diagnosis  POA    Encephalomalacia on imaging study [G93.89]  Yes    Pneumonia due to infectious organism [J18.9]  Yes    ESRD (end stage renal disease) [N18.6]  Yes      Resolved Hospital Problems    Diagnosis Date Resolved POA    *Encephalopathy, metabolic  [G92] 08/09/2019 Yes    Disorientation [R41.0] 08/09/2019 Yes    Hypoglycemia [E16.2] 08/09/2019 Yes    Elevated lactic acid level [R79.89] 08/09/2019 Yes    Altered mental status [R41.82] 08/09/2019 Yes    Atrial fibrillation with RVR [I48.91] 08/09/2019 Yes       Nephrology Assessment/Plan:    1. ESRD  No issues  Volume good  Continue with HD MWF  Monitor lab  Renally dose all appropriate medications     2. HTN  Variable, continue current regimen, clonidine prn systolic >180 diastolic >110.   Increase losartan if needed  We will ultrafilter on HD as tolerated     3. HYPOKALEMIA  Use higher K dialysate  Mag ok  Monitor lab     4. ANEMIA  At goal.  DONNY Hgb <10  Monitor     5. THROMBOCYTOPENIA  New this admit, resolved. ,ay ne antibiotic induced  Retic, LDH, haptoglobin okay, HIT pending     4. SECONDARY HYPERPARATHYROIDISM  Phos low, encouraged po intake, monitor  No binders  Monitor     5. ALTERED MENTAL STATUS  Cleared  Defer to Neuro     6. LEG WOUND  Recurrence calciphylaxis?  Continue Na thiosulfate  PTH noted  On Abx     RL Chung/Joseph Ingram M.D.

## 2019-08-11 LAB
ALBUMIN SERPL BCP-MCNC: 3 G/DL (ref 3.5–5.2)
ANION GAP SERPL CALC-SCNC: 24 MMOL/L (ref 8–16)
BACTERIA BLD CULT: NORMAL
BACTERIA BLD CULT: NORMAL
BUN SERPL-MCNC: 16 MG/DL (ref 8–23)
CALCIUM SERPL-MCNC: 9.1 MG/DL (ref 8.7–10.5)
CHLORIDE SERPL-SCNC: 93 MMOL/L (ref 95–110)
CO2 SERPL-SCNC: 23 MMOL/L (ref 23–29)
CREAT SERPL-MCNC: 6.7 MG/DL (ref 0.5–1.4)
EST. GFR  (AFRICAN AMERICAN): 6.5 ML/MIN/1.73 M^2
EST. GFR  (NON AFRICAN AMERICAN): 5.7 ML/MIN/1.73 M^2
GLUCOSE SERPL-MCNC: 56 MG/DL (ref 70–110)
GLUCOSE SERPL-MCNC: 61 MG/DL (ref 70–110)
GLUCOSE SERPL-MCNC: 63 MG/DL (ref 70–110)
GLUCOSE SERPL-MCNC: 65 MG/DL (ref 70–110)
GLUCOSE SERPL-MCNC: 67 MG/DL (ref 70–110)
GLUCOSE SERPL-MCNC: 67 MG/DL (ref 70–110)
PHOSPHATE SERPL-MCNC: 2.9 MG/DL (ref 2.7–4.5)
POTASSIUM SERPL-SCNC: 3.4 MMOL/L (ref 3.5–5.1)
SODIUM SERPL-SCNC: 140 MMOL/L (ref 136–145)

## 2019-08-11 PROCEDURE — 25000003 PHARM REV CODE 250: Performed by: INTERNAL MEDICINE

## 2019-08-11 PROCEDURE — 63600175 PHARM REV CODE 636 W HCPCS: Performed by: NURSE PRACTITIONER

## 2019-08-11 PROCEDURE — 25000003 PHARM REV CODE 250: Performed by: NURSE PRACTITIONER

## 2019-08-11 PROCEDURE — 94761 N-INVAS EAR/PLS OXIMETRY MLT: CPT

## 2019-08-11 PROCEDURE — 27000221 HC OXYGEN, UP TO 24 HOURS

## 2019-08-11 PROCEDURE — 82962 GLUCOSE BLOOD TEST: CPT

## 2019-08-11 PROCEDURE — 63600175 PHARM REV CODE 636 W HCPCS: Performed by: INTERNAL MEDICINE

## 2019-08-11 PROCEDURE — 80069 RENAL FUNCTION PANEL: CPT

## 2019-08-11 PROCEDURE — 36415 COLL VENOUS BLD VENIPUNCTURE: CPT

## 2019-08-11 PROCEDURE — 21000000 HC CCU ICU ROOM CHARGE

## 2019-08-11 RX ORDER — ZIPRASIDONE MESYLATE 20 MG/ML
10 INJECTION, POWDER, LYOPHILIZED, FOR SOLUTION INTRAMUSCULAR ONCE AS NEEDED
Status: COMPLETED | OUTPATIENT
Start: 2019-08-11 | End: 2019-08-11

## 2019-08-11 RX ADMIN — HEPARIN SODIUM 5000 UNITS: 5000 INJECTION INTRAVENOUS; SUBCUTANEOUS at 01:08

## 2019-08-11 RX ADMIN — CEFDINIR 300 MG: 300 CAPSULE ORAL at 10:08

## 2019-08-11 RX ADMIN — ZIPRASIDONE MESYLATE 10 MG: 20 INJECTION, POWDER, LYOPHILIZED, FOR SOLUTION INTRAMUSCULAR at 07:08

## 2019-08-11 RX ADMIN — HEPARIN SODIUM 5000 UNITS: 5000 INJECTION INTRAVENOUS; SUBCUTANEOUS at 05:08

## 2019-08-11 RX ADMIN — ASPIRIN 81 MG: 81 TABLET, COATED ORAL at 10:08

## 2019-08-11 NOTE — PROGRESS NOTES
Therapy with Vancomycin complete and/or consult/order discontinued by Dr. EVE Lea on 8/10/19 @ 21:34.  Pharmacy will sign off, please re-consult as needed.     Pharmacist: Alicia Villagomez  Ext: [phone]

## 2019-08-11 NOTE — PROGRESS NOTES
Progress Note  Nephrology    Admit Date: 8/6/2019   LOS: 5 days     SUBJECTIVE:     Follow-up For:  ESRD    Altered mental status  Responds to painful stimuli, moaning  Unable to take oral medications due to altered status thus b/p elevated  Blood sugars running low with poor oral intake  Discussed options with family who suspects changes due to seizure medications    Review of Systems:  GENERAL: NO fever, chills, night sweats  HEENT: NO blurry vision, glasses, floaters, hearing defects, sore throat  CV:  NO CP, Palpitations, Edema, Arrhythmias  PULM:  NO Cough, SOB, ARGUELLO, Hemoptysis, PND, Orthopnea  GI:  Positive reported Nausea, Vomiting; No Diarrhea, BRBPR, Melena, Abdominal Pain  :  NO Dysuria, Frequency, Urgency, Hematuria  NEURO: NO LOC, Seizure activity, Dizziness  SKIN:  NO Rash, Pruritis, Petechiae  MS:  NO Arthralgia, Muscle Aches, Arthritis  Right arm access  Anuric  Decreased appetite    OBJECTIVE:     Vital Signs (Most Recent)  Temp: 97.6 °F (36.4 °C) (08/11/19 0350)  Pulse: 80 (08/11/19 0350)  Resp: (!) 27 (08/11/19 0350)  BP: (!) 111/57 (08/11/19 0350)  SpO2: 97 % (08/11/19 0350)    Vital Signs Range (Last 24H):  Temp:  [97.4 °F (36.3 °C)-97.9 °F (36.6 °C)]   Pulse:  [78-91]   Resp:  [16-27]   BP: ()/()   SpO2:  [92 %-100 %]     No intake/output data recorded.    Physical Exam:   General: Restless not following commands moaning lying on left side  HEENT: Dry  NECK: No JVD. Supple,  No swelling, No masses. Fair turgor  CV: Irregular rate and rhthym with murmurs  PULM: Diminshed bilat bases  ABD: Soft, nl BS, NT, ND.  EXTR: No edema, clubbing, cyanosis.   NEURO: Non focal and grossly intact.  SKIN: No rashes, lesions, ulcers.   MS: No joint effusions.   PSYCH: Altered mental status  Right arm access positive thrill and bruit    Laboratory:  Recent Labs   Lab 08/10/19  0324   HGB 10.4*   HCT 33.0*   WBC 7.27       Recent Labs   Lab 08/08/19  0326 08/09/19  0752 08/10/19  0324 08/11/19  0342     137 145 140   K 3.0* 3.1* 3.5 3.4*   CL 98 96 98 93*   CO2 27 25 26 23   BUN 15 19 12 16   CREATININE 5.9* 7.5* 5.6* 6.7*   GLU 71 67* 69* 56*   CALCIUM 8.8 8.7 9.4 9.1   PHOS 2.4*  --   --  2.9       Lab Results   Component Value Date    PTH 6.4 (L) 08/09/2019    CALCIUM 9.1 08/11/2019    CAION 0.84 (L) 07/26/2017    PHOS 2.9 08/11/2019       No results found for: URICACID    BNP  Recent Labs   Lab 08/09/19  0752   BNP >4,500*         Problem List:    Active Hospital Problems    Diagnosis  POA    Encephalomalacia on imaging study [G93.89]  Yes    Pneumonia due to infectious organism [J18.9]  Yes    ESRD (end stage renal disease) [N18.6]  Yes      Resolved Hospital Problems    Diagnosis Date Resolved POA    *Encephalopathy, metabolic  [G92] 08/09/2019 Yes    Disorientation [R41.0] 08/09/2019 Yes    Hypoglycemia [E16.2] 08/09/2019 Yes    Elevated lactic acid level [R79.89] 08/09/2019 Yes    Altered mental status [R41.82] 08/09/2019 Yes    Atrial fibrillation with RVR [I48.91] 08/09/2019 Yes       Nephrology Assessment/Plan:    1. ESRD  No edema, persistent low sugars, PEG?-defer to hospital medicine  Continue with HD MWF  Monitor lab  Renally dose all appropriate medications     2. HTN  Will need IV medications for uncontrolled HTN due to altered mental status  Variable, continue current regimen, clonidine prn systolic >180 diastolic >110. When tolerating po  Increase losartan if needed       3. HYPOKALEMIA  Use higher K dialysate  May supplement  Mag ok  Monitor lab     4. ANEMIA  At goal.  DONNY Hgb <10  Monitor     5. THROMBOCYTOPENIA  New this admit, resolved at this time, likely antibiotic induced  Retic, LDH, haptoglobin okay, HIT pending     4. SECONDARY HYPERPARATHYROIDISM  Phos low, encouraged po intake, monitor  No binders  Monitor     5. ALTERED MENTAL STATUS  Defer to Neuro  May be drug induced     6. LEG WOUND  Recurrence calciphylaxis?  Continue Na thiosulfate  PTH noted  On  Abx     RL Chung/Joseph Ingram M.D.

## 2019-08-11 NOTE — PT/OT/SLP PROGRESS
"Physical Therapy      Patient Name:  Griselda Neely   MRN:  [id number]    Patient not seen today secondary to Patient unwilling to participate, Other (Comment)(pt is alert but not responsive to PTA when asked if she will participate in therapy. Family present and states, "she is not in her right mind"). Will follow-up 8/12/2019.    Tamiko Acosta PTA    "

## 2019-08-11 NOTE — PT/OT/SLP PROGRESS
"Occupational Therapy      Patient Name:  Griselda Neely   MRN:  [id number]    Patient not seen today secondary to Patient unwilling to participate. Pt stated "I don't know" when asked to participate in therapy evaluation. Pt agreeable to direct questions to pt's sister. Therapist asked pt's sister evaluation questions and attempted to sit pt up. Pt then refusing, stating "I just don't feel like it." Will follow-up next service date.    Medina Dietrich OT  8/11/2019  "

## 2019-08-11 NOTE — PLAN OF CARE
Problem: Fall Injury Risk  Goal: Absence of Fall and Fall-Related Injury    Intervention: Identify and Manage Contributors to Fall Injury Risk     08/08/19 1930 08/11/19 0618   Manage Acute Allergic Reaction   Medication Review/Management  --  medications reviewed   Identify and Manage Contributors to Fall Injury Risk   Self-Care Promotion independence encouraged;safe use of adaptive equipment encouraged  --

## 2019-08-11 NOTE — PROGRESS NOTES
"[facility] Medicine  Progress Note    Patient Name: Griselda Neely  MRN: [id number]  Patient Class: IP- Inpatient   Admission Date: 8/6/2019  Length of Stay: 4 days  Attending Physician: Daren Lea MD  Primary Care Provider: Kalie Neely MD        Subjective:     Principal Problem:Encephalopathy, toxic      HPI:  Griselda Neely is a 73 y.o. female with a history as below who presented to the ED from her cardiologist office via  for evaluation of altered mental status changes.  Sister at bedside reports patient has been in [facility] s/p discharge from hospital ~ 3 weeks ago, diagnosis unclear as family states "I know she was started on anti-seizure medications, Keppra and vimpat. Sister at bedside further states patient stopped eating and drinking on Friday.  Further reports patient with ESRD, had dialysis on Wednesday and Friday but was taken off early. Also reports went to HD on Monday.  Per chart review, patient hospitalized In July 2019 and developed acute encephalopathy during that admission, neurology was consulted with extensive work-up and non-acute findings. On admit, labs and imaging reviewed.  Neurology consulted with recs for MRI/MRA and EEG.  Admitted for acute encephalopathy. Nephrology consulted for inpatient HD.           Interval history:  Uncooperative. Says "I dont feel good," but when asked to elaborate ignores the question. Refuses to answer further questions. Family says she vomited this morning and are concerned about her going home because she may not tolerate her PO meds.     Vitals:    08/10/19 1930   BP:    Pulse: 78   Resp: 16   Temp:        Physical Exam   Constitutional: She is oriented to person, place, and time. No distress.   HENT:   Head: Normocephalic and atraumatic.   Eyes: Pupils are equal, round, and reactive to light. EOM are normal.   Neck: Neck supple.   Cardiovascular: Intact distal pulses. An irregularly irregular rhythm " present.   Pulmonary/Chest: Effort normal and breath sounds normal.   Abdominal: Soft. There is no tenderness.   Musculoskeletal: Normal range of motion.   Neurological: She is alert and oriented to person, place, and time.   Skin: Skin is warm and dry. Capillary refill takes less than 2 seconds.                   Assessment/Plan:      * Encephalopathy, metabolic   AMS  - resolved, at baseline  Multifactorial - ?metabolic +/- uremia +/- infectious process  +/- hypoglycemia +/- medications induced. Patient with recent admission July 2019 and developed acute encephalopathy at that time. Neurology consulted, extensive work-up completed; started on vimpat and keppra.  CT completed and without acute findings. CXR shows persistence of predominantly ground-glass type pulmonary opacities in the right mid and lower lung zone, ?poss contributory to acute encephalitis  -Continue abx  -Neurology consulted  -Given extensive neurology work-up will defer further testing to neurology (MRI/MRA and EEG)  -Continue keppra and vimpat        Nausea/emesis   - x1, no abd pain or bleeding  - family concerned about her tolerating her po meds at home   - schedule zofran, will pursue further w/u if persistent or other symptoms manifest    Pneumonia due to infectious organism  On admit CXR shows persistence of predominantly ground-glass type pulmonary opacities in the right mid and lower lung zone, ?poss contributory to acute encephalitis  -Continue abx     Altered mental status  Etiology unclear. See above      Hypoglycemia  -Accuchecks q6  -Hypoglycemia protocol      Atrial fibrillation with RVR  Patient with PAF on admit telemetry showed a-fib rvr, started on cardizem gtt in ED  -Continuous cardiac monitoring   -Continue cardizem PO  -Cardiology consult appreciated  -Restart home metoprolol/ASA as well as po cardizem, wean gtt as tolerated    ESRD (end stage renal disease)  Nephrology consulted for inpatient HD        VTE Risk Mitigation  (From admission, onward)        Ordered     heparin (porcine) injection 5,000 Units  Every 8 hours      08/06/19 2001     Place ROSANNA hose  Until discontinued      08/06/19 2001     IP VTE HIGH RISK PATIENT  Once      08/06/19 2001                Daren Lea MD  Department of Hospital Medicine   [facility]

## 2019-08-11 NOTE — NURSING
ATTEMPTED TO DO WOUND CARE AS ORDERED.  PT BECAME VERY COMBATIVE WITH STAFF AND FAMILY.  [place] [facility] APPLIED AND PT WAS TOO COMBATIVE TO PROCEED.  DR CALVO NOTIFIED OF DIFFICULTY.

## 2019-08-11 NOTE — PROGRESS NOTES
"[facility] Medicine  Progress Note    Patient Name: Griselda Neely  MRN: [id number]  Patient Class: IP- Inpatient   Admission Date: 8/6/2019  Length of Stay: 5 days  Attending Physician: Daren Lea MD  Primary Care Provider: Kalie Neely MD        Subjective:     Principal Problem:Encephalopathy, toxic      HPI:  Griselda Neely is a 73 y.o. female with a history as below who presented to the ED from her cardiologist office via  for evaluation of altered mental status changes.  Sister at bedside reports patient has been in [facility] s/p discharge from hospital ~ 3 weeks ago, diagnosis unclear as family states "I know she was started on anti-seizure medications, Keppra and vimpat. Sister at bedside further states patient stopped eating and drinking on Friday.  Further reports patient with ESRD, had dialysis on Wednesday and Friday but was taken off early. Also reports went to HD on Monday.  Per chart review, patient hospitalized In July 2019 and developed acute encephalopathy during that admission, neurology was consulted with extensive work-up and non-acute findings. On admit, labs and imaging reviewed.  Neurology consulted with recs for MRI/MRA and EEG.  Admitted for acute encephalopathy. Nephrology consulted for inpatient HD.           Interval history:  Per family at bedside, she has been intermittently confused.   She will only answer questions with "I feel bad."   She is in bed with her eyes closed but opens eyes quickly and alert when spoken to.     Vitals:    08/11/19 1520   BP: (!) 190/78   Pulse:    Resp:    Temp:        Physical Exam   Constitutional: No distress.   HENT:   Head: Normocephalic and atraumatic.   Eyes: Pupils are equal, round, and reactive to light. EOM are normal.   Neck: Neck supple.   Cardiovascular: Intact distal pulses. An irregularly irregular rhythm present.   Pulmonary/Chest: Effort normal. No respiratory distress.   Abdominal: Soft. " There is no tenderness.   Musculoskeletal: Normal range of motion.   Neurological: She is alert. She exhibits normal muscle tone.   Oriented to person, place  Uncooperative with remainder of exam     Skin: Skin is warm and dry. Capillary refill takes less than 2 seconds.         Assessment/Plan:      * Encephalopathy, metabolic   AMS  - waxing and waning  Multifactorial - ?metabolic +/- uremia +/- infectious process  +/- hypoglycemia +/- medications induced. Patient with recent admission July 2019 and developed acute encephalopathy at that time. Neurology consulted, extensive work-up completed; started on vimpat and keppra.  CT completed and without acute findings. CXR shows persistence of predominantly ground-glass type pulmonary opacities in the right mid and lower lung zone, ?poss contributory to acute encephalitis  -Continue abx  -Neurology consulted  -Given extensive neurology work-up will defer further testing to neurology (MRI/MRA and EEG)  -Continue keppra. Hold vimpat in case it could be contributing      Nausea/emesis   - x1, no abd pain or bleeding  - family concerned about her tolerating her po meds at home   - schedule zofran, will pursue further w/u if persistent or other symptoms manifest    Pneumonia due to infectious organism  On admit CXR shows persistence of predominantly ground-glass type pulmonary opacities in the right mid and lower lung zone, ?poss contributory to acute encephalitis  -Continue abx     Altered mental status  Etiology unclear. See above    HTN, uncontrolled   - refusing PO meds and also refusing IV access      Hypoglycemia  -Accuchecks q6  -Hypoglycemia protocol      Atrial fibrillation with RVR  Patient with PAF on admit telemetry showed a-fib rvr, started on cardizem gtt in ED  -Continuous cardiac monitoring   -Continue cardizem PO  -Cardiology consult appreciated  -Restart home metoprolol/ASA as well as po cardizem, wean gtt as tolerated    ESRD (end stage renal  disease)  Nephrology consulted for inpatient HD        VTE Risk Mitigation (From admission, onward)        Ordered     heparin (porcine) injection 5,000 Units  Every 8 hours      08/06/19 2001     Place ROSANNA hose  Until discontinued      08/06/19 2001     IP VTE HIGH RISK PATIENT  Once      08/06/19 2001                Daren Lea MD  Department of Hospital Medicine   [facility]

## 2019-08-12 ENCOUNTER — ANESTHESIA (OUTPATIENT)
Dept: INTENSIVE CARE | Facility: HOSPITAL | Age: 74
DRG: 193 | End: 2019-08-12
Payer: MEDICARE

## 2019-08-12 ENCOUNTER — ANESTHESIA EVENT (OUTPATIENT)
Dept: INTENSIVE CARE | Facility: HOSPITAL | Age: 74
DRG: 193 | End: 2019-08-12
Payer: MEDICARE

## 2019-08-12 PROBLEM — R41.82 ALTERED MENTAL STATUS: Status: ACTIVE | Noted: 2019-08-12

## 2019-08-12 LAB
ALBUMIN SERPL BCP-MCNC: 2.8 G/DL (ref 3.5–5.2)
ALBUMIN SERPL BCP-MCNC: 2.8 G/DL (ref 3.5–5.2)
ALBUMIN SERPL BCP-MCNC: 2.9 G/DL (ref 3.5–5.2)
ALP SERPL-CCNC: 55 U/L (ref 55–135)
ALP SERPL-CCNC: 61 U/L (ref 55–135)
ALT SERPL W/O P-5'-P-CCNC: 8 U/L (ref 10–44)
ALT SERPL W/O P-5'-P-CCNC: 8 U/L (ref 10–44)
ANION GAP SERPL CALC-SCNC: 15 MMOL/L (ref 8–16)
ANION GAP SERPL CALC-SCNC: 18 MMOL/L (ref 8–16)
ANION GAP SERPL CALC-SCNC: 24 MMOL/L (ref 8–16)
AST SERPL-CCNC: 14 U/L (ref 10–40)
AST SERPL-CCNC: 15 U/L (ref 10–40)
BASOPHILS # BLD AUTO: 0.01 K/UL (ref 0–0.2)
BASOPHILS # BLD AUTO: 0.01 K/UL (ref 0–0.2)
BASOPHILS NFR BLD: 0.3 % (ref 0–1.9)
BASOPHILS NFR BLD: 0.4 % (ref 0–1.9)
BILIRUB SERPL-MCNC: 0.7 MG/DL (ref 0.1–1)
BILIRUB SERPL-MCNC: 0.7 MG/DL (ref 0.1–1)
BUN SERPL-MCNC: 12 MG/DL (ref 8–23)
BUN SERPL-MCNC: 16 MG/DL (ref 8–23)
BUN SERPL-MCNC: 20 MG/DL (ref 8–23)
CALCIUM SERPL-MCNC: 8.3 MG/DL (ref 8.7–10.5)
CALCIUM SERPL-MCNC: 8.4 MG/DL (ref 8.7–10.5)
CALCIUM SERPL-MCNC: 8.9 MG/DL (ref 8.7–10.5)
CHLORIDE SERPL-SCNC: 94 MMOL/L (ref 95–110)
CHLORIDE SERPL-SCNC: 94 MMOL/L (ref 95–110)
CHLORIDE SERPL-SCNC: 99 MMOL/L (ref 95–110)
CO2 SERPL-SCNC: 21 MMOL/L (ref 23–29)
CO2 SERPL-SCNC: 23 MMOL/L (ref 23–29)
CO2 SERPL-SCNC: 24 MMOL/L (ref 23–29)
CREAT SERPL-MCNC: 4.6 MG/DL (ref 0.5–1.4)
CREAT SERPL-MCNC: 7 MG/DL (ref 0.5–1.4)
CREAT SERPL-MCNC: 8.7 MG/DL (ref 0.5–1.4)
DIFFERENTIAL METHOD: ABNORMAL
DIFFERENTIAL METHOD: ABNORMAL
EOSINOPHIL # BLD AUTO: 0 K/UL (ref 0–0.5)
EOSINOPHIL # BLD AUTO: 0.1 K/UL (ref 0–0.5)
EOSINOPHIL NFR BLD: 0.7 % (ref 0–8)
EOSINOPHIL NFR BLD: 1.5 % (ref 0–8)
ERYTHROCYTE [DISTWIDTH] IN BLOOD BY AUTOMATED COUNT: 23.3 % (ref 11.5–14.5)
ERYTHROCYTE [DISTWIDTH] IN BLOOD BY AUTOMATED COUNT: 23.6 % (ref 11.5–14.5)
EST. GFR  (AFRICAN AMERICAN): 10.3 ML/MIN/1.73 M^2
EST. GFR  (AFRICAN AMERICAN): 4.8 ML/MIN/1.73 M^2
EST. GFR  (AFRICAN AMERICAN): 6.2 ML/MIN/1.73 M^2
EST. GFR  (NON AFRICAN AMERICAN): 4.1 ML/MIN/1.73 M^2
EST. GFR  (NON AFRICAN AMERICAN): 5.4 ML/MIN/1.73 M^2
EST. GFR  (NON AFRICAN AMERICAN): 8.9 ML/MIN/1.73 M^2
GLUCOSE SERPL-MCNC: 100 MG/DL (ref 70–110)
GLUCOSE SERPL-MCNC: 104 MG/DL (ref 70–110)
GLUCOSE SERPL-MCNC: 145 MG/DL (ref 70–110)
GLUCOSE SERPL-MCNC: 179 MG/DL (ref 70–110)
GLUCOSE SERPL-MCNC: 179 MG/DL (ref 70–110)
GLUCOSE SERPL-MCNC: 50 MG/DL (ref 70–110)
GLUCOSE SERPL-MCNC: 51 MG/DL (ref 70–110)
GLUCOSE SERPL-MCNC: 54 MG/DL (ref 70–110)
GLUCOSE SERPL-MCNC: 57 MG/DL (ref 70–110)
GLUCOSE SERPL-MCNC: 57 MG/DL (ref 70–110)
GLUCOSE SERPL-MCNC: 67 MG/DL (ref 70–110)
GLUCOSE SERPL-MCNC: 71 MG/DL (ref 70–110)
GLUCOSE SERPL-MCNC: 73 MG/DL (ref 70–110)
GLUCOSE SERPL-MCNC: 74 MG/DL (ref 70–110)
GLUCOSE SERPL-MCNC: 92 MG/DL (ref 70–110)
GLUCOSE SERPL-MCNC: 97 MG/DL (ref 70–110)
HCT VFR BLD AUTO: 32.5 % (ref 37–48.5)
HCT VFR BLD AUTO: 36.4 % (ref 37–48.5)
HGB BLD-MCNC: 10.7 G/DL (ref 12–16)
HGB BLD-MCNC: 11.7 G/DL (ref 12–16)
IMM GRANULOCYTES # BLD AUTO: 0.01 K/UL (ref 0–0.04)
IMM GRANULOCYTES # BLD AUTO: 0.01 K/UL (ref 0–0.04)
IMM GRANULOCYTES NFR BLD AUTO: 0.3 % (ref 0–0.5)
IMM GRANULOCYTES NFR BLD AUTO: 0.4 % (ref 0–0.5)
LEVETIRACETAM SERPL-MCNC: 14.4 UG/ML (ref 10–40)
LYMPHOCYTES # BLD AUTO: 0.7 K/UL (ref 1–4.8)
LYMPHOCYTES # BLD AUTO: 0.7 K/UL (ref 1–4.8)
LYMPHOCYTES NFR BLD: 21.9 % (ref 18–48)
LYMPHOCYTES NFR BLD: 27.1 % (ref 18–48)
MAGNESIUM SERPL-MCNC: 1.7 MG/DL (ref 1.6–2.6)
MCH RBC QN AUTO: 26.4 PG (ref 27–31)
MCH RBC QN AUTO: 26.4 PG (ref 27–31)
MCHC RBC AUTO-ENTMCNC: 32.1 G/DL (ref 32–36)
MCHC RBC AUTO-ENTMCNC: 32.9 G/DL (ref 32–36)
MCV RBC AUTO: 80 FL (ref 82–98)
MCV RBC AUTO: 82 FL (ref 82–98)
MONOCYTES # BLD AUTO: 0.4 K/UL (ref 0.3–1)
MONOCYTES # BLD AUTO: 0.7 K/UL (ref 0.3–1)
MONOCYTES NFR BLD: 13.6 % (ref 4–15)
MONOCYTES NFR BLD: 19.5 % (ref 4–15)
NEUTROPHILS # BLD AUTO: 1.6 K/UL (ref 1.8–7.7)
NEUTROPHILS # BLD AUTO: 1.9 K/UL (ref 1.8–7.7)
NEUTROPHILS NFR BLD: 56.5 % (ref 38–73)
NEUTROPHILS NFR BLD: 57.8 % (ref 38–73)
NRBC BLD-RTO: 0 /100 WBC
NRBC BLD-RTO: 1 /100 WBC
PHOSPHATE SERPL-MCNC: 3.7 MG/DL (ref 2.7–4.5)
PLATELET # BLD AUTO: 151 K/UL (ref 150–350)
PLATELET # BLD AUTO: 154 K/UL (ref 150–350)
PMV BLD AUTO: 9.3 FL (ref 9.2–12.9)
PMV BLD AUTO: ABNORMAL FL (ref 9.2–12.9)
POTASSIUM SERPL-SCNC: 3.1 MMOL/L (ref 3.5–5.1)
POTASSIUM SERPL-SCNC: 3.3 MMOL/L (ref 3.5–5.1)
POTASSIUM SERPL-SCNC: 3.7 MMOL/L (ref 3.5–5.1)
PROT SERPL-MCNC: 6.5 G/DL (ref 6–8.4)
PROT SERPL-MCNC: 6.6 G/DL (ref 6–8.4)
RBC # BLD AUTO: 4.06 M/UL (ref 4–5.4)
RBC # BLD AUTO: 4.43 M/UL (ref 4–5.4)
SODIUM SERPL-SCNC: 135 MMOL/L (ref 136–145)
SODIUM SERPL-SCNC: 138 MMOL/L (ref 136–145)
SODIUM SERPL-SCNC: 139 MMOL/L (ref 136–145)
WBC # BLD AUTO: 2.73 K/UL (ref 3.9–12.7)
WBC # BLD AUTO: 3.34 K/UL (ref 3.9–12.7)

## 2019-08-12 PROCEDURE — 36556 INSERT NON-TUNNEL CV CATH: CPT

## 2019-08-12 PROCEDURE — 87040 BLOOD CULTURE FOR BACTERIA: CPT

## 2019-08-12 PROCEDURE — 80053 COMPREHEN METABOLIC PANEL: CPT

## 2019-08-12 PROCEDURE — 80069 RENAL FUNCTION PANEL: CPT

## 2019-08-12 PROCEDURE — 86706 HEP B SURFACE ANTIBODY: CPT

## 2019-08-12 PROCEDURE — 86704 HEP B CORE ANTIBODY TOTAL: CPT

## 2019-08-12 PROCEDURE — 95819 EEG AWAKE AND ASLEEP: CPT

## 2019-08-12 PROCEDURE — P9047 ALBUMIN (HUMAN), 25%, 50ML: HCPCS | Mod: JG | Performed by: INTERNAL MEDICINE

## 2019-08-12 PROCEDURE — 87340 HEPATITIS B SURFACE AG IA: CPT

## 2019-08-12 PROCEDURE — 25000003 PHARM REV CODE 250: Performed by: INTERNAL MEDICINE

## 2019-08-12 PROCEDURE — 63600175 PHARM REV CODE 636 W HCPCS: Mod: JG | Performed by: INTERNAL MEDICINE

## 2019-08-12 PROCEDURE — 83735 ASSAY OF MAGNESIUM: CPT

## 2019-08-12 PROCEDURE — 85025 COMPLETE CBC W/AUTO DIFF WBC: CPT | Mod: 91

## 2019-08-12 PROCEDURE — 25800024 PHARM REV CODE 258: Performed by: INTERNAL MEDICINE

## 2019-08-12 PROCEDURE — 25000003 PHARM REV CODE 250: Performed by: NURSE PRACTITIONER

## 2019-08-12 PROCEDURE — 36415 COLL VENOUS BLD VENIPUNCTURE: CPT

## 2019-08-12 PROCEDURE — C9254 INJECTION, LACOSAMIDE: HCPCS | Performed by: NURSE PRACTITIONER

## 2019-08-12 PROCEDURE — 25000003 PHARM REV CODE 250: Performed by: PSYCHIATRY & NEUROLOGY

## 2019-08-12 PROCEDURE — 90935 HEMODIALYSIS ONE EVALUATION: CPT

## 2019-08-12 PROCEDURE — 25000003 PHARM REV CODE 250

## 2019-08-12 PROCEDURE — 63600175 PHARM REV CODE 636 W HCPCS: Performed by: INTERNAL MEDICINE

## 2019-08-12 PROCEDURE — 63600175 PHARM REV CODE 636 W HCPCS: Performed by: NURSE PRACTITIONER

## 2019-08-12 PROCEDURE — C9254 INJECTION, LACOSAMIDE: HCPCS | Performed by: PSYCHIATRY & NEUROLOGY

## 2019-08-12 PROCEDURE — 82962 GLUCOSE BLOOD TEST: CPT

## 2019-08-12 PROCEDURE — 63600175 PHARM REV CODE 636 W HCPCS: Performed by: PSYCHIATRY & NEUROLOGY

## 2019-08-12 PROCEDURE — 94761 N-INVAS EAR/PLS OXIMETRY MLT: CPT

## 2019-08-12 PROCEDURE — 87040 BLOOD CULTURE FOR BACTERIA: CPT | Mod: 59

## 2019-08-12 PROCEDURE — 20000000 HC ICU ROOM

## 2019-08-12 RX ORDER — DEXTROSE MONOHYDRATE 100 MG/ML
INJECTION, SOLUTION INTRAVENOUS CONTINUOUS
Status: DISCONTINUED | OUTPATIENT
Start: 2019-08-12 | End: 2019-08-12

## 2019-08-12 RX ORDER — VANCOMYCIN HCL IN 5 % DEXTROSE 1G/250ML
20 PLASTIC BAG, INJECTION (ML) INTRAVENOUS ONCE
Status: DISCONTINUED | OUTPATIENT
Start: 2019-08-12 | End: 2019-08-12

## 2019-08-12 RX ORDER — ALBUMIN HUMAN 250 G/1000ML
12.5 SOLUTION INTRAVENOUS ONCE
Status: COMPLETED | OUTPATIENT
Start: 2019-08-12 | End: 2019-08-12

## 2019-08-12 RX ORDER — DILTIAZEM HCL 1 MG/ML
INJECTION, SOLUTION INTRAVENOUS
Status: COMPLETED
Start: 2019-08-12 | End: 2019-08-12

## 2019-08-12 RX ORDER — LEVETIRACETAM 5 MG/ML
500 INJECTION INTRAVASCULAR ONCE
Status: DISCONTINUED | OUTPATIENT
Start: 2019-08-12 | End: 2019-08-12 | Stop reason: DRUGHIGH

## 2019-08-12 RX ORDER — NOREPINEPHRINE BITARTRATE 1 MG/ML
INJECTION, SOLUTION INTRAVENOUS
Status: DISPENSED
Start: 2019-08-12 | End: 2019-08-13

## 2019-08-12 RX ORDER — METOPROLOL TARTRATE 1 MG/ML
5 INJECTION, SOLUTION INTRAVENOUS EVERY 6 HOURS PRN
Status: DISCONTINUED | OUTPATIENT
Start: 2019-08-12 | End: 2019-08-19 | Stop reason: HOSPADM

## 2019-08-12 RX ORDER — DILTIAZEM HCL 1 MG/ML
5 INJECTION, SOLUTION INTRAVENOUS CONTINUOUS
Status: DISCONTINUED | OUTPATIENT
Start: 2019-08-12 | End: 2019-08-17

## 2019-08-12 RX ORDER — DEXTROSE 20 G/100ML
INJECTION, SOLUTION INTRAVENOUS CONTINUOUS
Status: DISCONTINUED | OUTPATIENT
Start: 2019-08-12 | End: 2019-08-15

## 2019-08-12 RX ORDER — DEXTROSE MONOHYDRATE 50 MG/ML
INJECTION, SOLUTION INTRAVENOUS CONTINUOUS
Status: DISCONTINUED | OUTPATIENT
Start: 2019-08-12 | End: 2019-08-12

## 2019-08-12 RX ADMIN — HEPARIN SODIUM 5000 UNITS: 5000 INJECTION INTRAVENOUS; SUBCUTANEOUS at 09:08

## 2019-08-12 RX ADMIN — SODIUM CHLORIDE: 9 INJECTION, SOLUTION INTRAVENOUS at 11:08

## 2019-08-12 RX ADMIN — Medication 5 MG/HR: at 06:08

## 2019-08-12 RX ADMIN — METOPROLOL TARTRATE 5 MG: 1 INJECTION, SOLUTION INTRAVENOUS at 11:08

## 2019-08-12 RX ADMIN — DEXTROSE: 5 SOLUTION INTRAVENOUS at 09:08

## 2019-08-12 RX ADMIN — LEVETIRACETAM 500 MG: 100 INJECTION, SOLUTION, CONCENTRATE INTRAVENOUS at 10:08

## 2019-08-12 RX ADMIN — HEPARIN SODIUM 5000 UNITS: 5000 INJECTION INTRAVENOUS; SUBCUTANEOUS at 04:08

## 2019-08-12 RX ADMIN — LEVETIRACETAM 500 MG: 500 INJECTION, SOLUTION, CONCENTRATE INTRAVENOUS at 11:08

## 2019-08-12 RX ADMIN — PIPERACILLIN AND TAZOBACTAM 4.5 G: 4; .5 INJECTION, POWDER, LYOPHILIZED, FOR SOLUTION INTRAVENOUS; PARENTERAL at 10:08

## 2019-08-12 RX ADMIN — DEXTROSE: 20 INJECTION, SOLUTION INTRAVENOUS at 08:08

## 2019-08-12 RX ADMIN — DEXTROSE: 10 SOLUTION INTRAVENOUS at 10:08

## 2019-08-12 RX ADMIN — METOPROLOL TARTRATE 5 MG: 1 INJECTION, SOLUTION INTRAVENOUS at 04:08

## 2019-08-12 RX ADMIN — EPOETIN ALFA-EPBX 2800 UNITS: 10000 INJECTION, SOLUTION INTRAVENOUS; SUBCUTANEOUS at 08:08

## 2019-08-12 RX ADMIN — ALBUMIN (HUMAN) 12.5 G: 12.5 SOLUTION INTRAVENOUS at 07:08

## 2019-08-12 RX ADMIN — SODIUM CHLORIDE 100 MG: 9 INJECTION, SOLUTION INTRAVENOUS at 03:08

## 2019-08-12 RX ADMIN — DEXTROSE MONOHYDRATE 12.5 G: 500 INJECTION PARENTERAL at 04:08

## 2019-08-12 RX ADMIN — SODIUM THIOSULFATE 12.5 G: 250 INJECTION, SOLUTION INTRAVENOUS at 08:08

## 2019-08-12 RX ADMIN — HEPARIN SODIUM 5000 UNITS: 5000 INJECTION INTRAVENOUS; SUBCUTANEOUS at 02:08

## 2019-08-12 RX ADMIN — DILTIAZEM HYDROCHLORIDE 5 MG/HR: 5 INJECTION INTRAVENOUS at 06:08

## 2019-08-12 RX ADMIN — LEVETIRACETAM 500 MG: 100 INJECTION, SOLUTION, CONCENTRATE INTRAVENOUS at 01:08

## 2019-08-12 RX ADMIN — SODIUM CHLORIDE 50 MG: 9 INJECTION, SOLUTION INTRAVENOUS at 11:08

## 2019-08-12 RX ADMIN — DEXTROSE MONOHYDRATE 12.5 G: 500 INJECTION PARENTERAL at 08:08

## 2019-08-12 NOTE — PROGRESS NOTES
[facility]  Neurology  Progress Note    Patient Name: Griselda Neely  MRN: [id number]  Admission Date: 8/6/2019  Hospital Length of Stay: 6 days  Code Status: Full Code   Attending Provider: Daren Lea MD  Primary Care Physician: Kalie Neely MD   Principal Problem:Encephalopathy, toxic    Subjective:     Interval History: Reconsulted for AMS today. Patient is nonverbal on exam, does not follow commands. EEG ordered, will give one time additional dose of Keppra 500mg and vimpat 100mg IVPB now.     Current Neurological Medications: MAR reviewed    HPI: Griselda Neely is a 73 y.o. female with a history as below who presented to the ED from her cardiologist office via  for evaluation of altered mental status changes.  Sister at bedside reports patient has been in [facility] s/p discharge from hospital ~ 3 weeks ago due to encephalopathy. Pt was started on Keppra and Vimpat during that admission. Sister at bedside further states patient stopped eating and drinking on Friday. Further reports patient with ESRD, had dialysis on Wednesday and Friday but was taken off early. Also reports went to HD on Monday.  Per chart review, patient hospitalized In July 2019 and developed acute encephalopathy during that admission, neurology was consulted with extensive work-up and non-acute findings. On admit, labs and imaging reviewed.  Neurology consulted with recs for MRI/MRA and EEG.     CRT: 6.7  K: 3.4  LDL: 55.2  Ammonia: 18  B12: 1045  TSH: 2.7  AED levels: pending      Brain imaging:  CT head 8/12/19: 1. No acute intracranial abnormality.  2. Chronic small vessel ischemic changes.    CT head:   No acute intracranial abnormality.       EEG: This is an abnormal awake and drowsy routine EEG due to diffuse slowing of the background activity consistent with a mild to moderate encephalopathy.    Current Facility-Administered Medications   Medication Dose Route Frequency Provider Last Rate Last  Dose    0.9%  NaCl infusion   Intravenous PRN Dane Martinez MD        aspirin EC tablet 81 mg  81 mg Oral Daily TODD Rush   81 mg at 08/11/19 1003    cefdinir capsule 300 mg  300 mg Oral Daily Daren Lea MD   300 mg at 08/11/19 1002    dextrose 10 % infusion   Intravenous Continuous Daren Lea MD 50 mL/hr at 08/12/19 1005      dextrose 50% injection 12.5 g  12.5 g Intravenous PRN TODD Rush   12.5 g at 08/12/19 0843    diltiaZEM 24 hr capsule 180 mg  180 mg Oral Daily Ramila Bloom NP   180 mg at 08/10/19 0947    epoetin alfonzo-epbx injection 2,800 Units  50 Units/kg Intravenous Every Mon, Wed, Fri Chalino Victoria MD   2,800 Units at 08/09/19 1145    glucagon (human recombinant) injection 1 mg  1 mg Intramuscular PRN TODD Rush        heparin (porcine) injection 5,000 Units  5,000 Units Subcutaneous Q8H TODD Rush   5,000 Units at 08/12/19 0955    insulin aspart U-100 pen 0-5 Units  0-5 Units Subcutaneous Q6H PRN TODD Rush        isosorbide mononitrate 24 hr tablet 30 mg  30 mg Oral Daily Ramila Bloom NP   30 mg at 08/10/19 0947    lacosamide (VIMPAT) 100 mg in sodium chloride 0.9% 100 mL IVPB  100 mg Intravenous Once Maggie Blood NP        levETIRAcetam (KEPPRA) 500 mg in dextrose 5 % 100 mL IVPB  500 mg Intravenous Daily Daren Lea  mL/hr at 08/12/19 1005 500 mg at 08/12/19 1005    levETIRAcetam in NaCl (iso-os) IVPB 500 mg  500 mg Intravenous Once Maggie Blood NP        losartan tablet 25 mg  25 mg Oral Daily Ramila Bloom NP   25 mg at 08/10/19 0947    metoprolol injection 5 mg  5 mg Intravenous Q6H PRN Daren Lea MD   5 mg at 08/12/19 1137    metoprolol succinate (TOPROL-XL) 24 hr tablet 50 mg  50 mg Oral Daily Ramila Bloom, NP   50 mg at 08/10/19 0930    sodium chloride 0.9% flush 10 mL  10 mL Intravenous PRN Amrit Liu, FNP        sodium thiosulfate 12.5 gram/50 mL (250  mg/mL) injection 12.5 g  12.5 g Intravenous Every Mon, Wed, Fri Thomas John Martinez MD   12.5 g at 08/09/19 1230       Review of Systems   Unable to perform ROS: Mental status change     Objective:     Vital Signs (Most Recent):  Temp: 97.4 °F (36.3 °C) (08/12/19 0336)  Pulse: 71 (08/12/19 0740)  Resp: 14 (08/12/19 0740)  BP: (!) 113/56 (08/12/19 0740)  SpO2: 97 % (08/12/19 0740) Vital Signs (24h Range):  Temp:  [97.4 °F (36.3 °C)-98 °F (36.7 °C)] 97.4 °F (36.3 °C)  Pulse:  [] 71  Resp:  [13-17] 14  SpO2:  [94 %-97 %] 97 %  BP: (113-190)/(52-78) 113/56     Weight: 52.1 kg (114 lb 13.8 oz)  Body mass index is 19.11 kg/m².    Physical Exam   Constitutional: She appears well-developed and well-nourished.   HENT:   Head: Normocephalic and atraumatic.   Eyes: Pupils are equal, round, and reactive to light.   Neck: Normal range of motion. Neck supple.   Cardiovascular: Normal rate, regular rhythm and normal heart sounds.   Pulmonary/Chest: Effort normal and breath sounds normal.   Abdominal: Soft.   Neurological: She is alert.   Skin: Skin is warm and dry.   Vitals reviewed.      NEUROLOGICAL EXAMINATION:     MENTAL STATUS        Nonverbal  Does not follow commands  QUEEN spontaneously     CRANIAL NERVES     CN III, IV, VI   Pupils are equal, round, and reactive to light.      Significant Labs: All pertinent lab results from the past 24 hours have been reviewed.    Significant Imaging: I have reviewed all pertinent imaging results/findings within the past 24 hours.    Assessment and Plan:   Encephalopathy  -Likely metabolic, multifactorial including dehydration, infection, and hypokalemia  -EEG: P  -B12, TSH, Folate, Ammonia WNL  -Previous MRI brain done July 2019 negative acute, shows remote ischemia      History of seizures  -Abnormal EEGs on previous admit and pt started on Vimpat and Keppra  -EEG: P  -Continue Current dose of Keppra   -Continue Vimpat 50mg daily and potentially plan to wean off Vimpat in  outpatient setting   - Load keppra 500mg IVPB and vimpat 100mg IVPB now       ESRD  -Nephrology following      Pneumonia   -Management per IM     Seizure education.    No driving for now, no swimming alone, no climbing high areas, no operation of heavy machinery or working with high risk electricity equipment.  Continue to take AEDs as prescribed. If any major side effects from neurological medications go to the ED including mood changes and severe depression.  Patient and/or next of kin informed.  Follow up Neurology in 2 weeks at [phone].  Medication side effects discussed with the patient and/or caregiver.  Active Diagnoses:    Diagnosis Date Noted POA    Encephalomalacia on imaging study [G93.89] 08/06/2019 Yes    Pneumonia due to infectious organism [J18.9] 08/06/2019 Yes    ESRD (end stage renal disease) [N18.6] 05/11/2016 Yes      Problems Resolved During this Admission:    Diagnosis Date Noted Date Resolved POA    PRINCIPAL PROBLEM:  Encephalopathy, metabolic  [G92] 08/06/2019 08/09/2019 Yes    Disorientation [R41.0] 08/06/2019 08/09/2019 Yes    Hypoglycemia [E16.2] 08/06/2019 08/09/2019 Yes    Elevated lactic acid level [R79.89] 08/06/2019 08/09/2019 Yes    Altered mental status [R41.82] 08/06/2019 08/09/2019 Yes    Atrial fibrillation with RVR [I48.91] 07/18/2017 08/09/2019 Yes       VTE Risk Mitigation (From admission, onward)        Ordered     heparin (porcine) injection 5,000 Units  Every 8 hours      08/06/19 2001     Place ROSANNA hose  Until discontinued      08/06/19 2001     IP VTE HIGH RISK PATIENT  Once      08/06/19 2001          Maggie Blood NP  Neurology  [facility]    I, Dr. Ramses Florentino, have personally seen and examined the patient with my advanced provider and agree with above. I personally did a focused exam, and reviewed all necessary clinical information. I discussed my management plan with my NP and agree with above. Obtain LTM, increase vimpat and Keppra, and Add  depakote

## 2019-08-12 NOTE — PLAN OF CARE
Problem: Oral Intake Inadequate  Goal: Improved Oral Intake  Outcome: Ongoing (interventions implemented as appropriate)  Pt family stated that the pt still has poor PO intake with 0% intake documented.  Intervention: Promote and Optimize Oral Intake  If PO intake remains poor, enteral nutrition may be necessary in order to meet caloric and protein needs.

## 2019-08-12 NOTE — PROGRESS NOTES
"[facility]  Adult Nutrition  Progress Note    SUMMARY       Recommendations   1. If PO intake still remains poor,may recommend appetite stimulant if appropriate and if MD and patient agree with recommendations. If appetite stimulant is not adequate or if not appropriate, enteral nutrition may be needed.   Goals: 1 Pt will meet >50% of EEN 2. Appetite stimulant initiated per MD and patient leading to increase in patient PO intake.  Nutrition Goal Status: on-going  Communication of RD Recs: RN stated that the patient was to be transferred to ICU to be closely monitored.     Reason for Assessment    Reason For Assessment: RD follow-up  Diagnosis: renal disease  Relevant Medical History: ESRD on HD, encephalopathy   Interdisciplinary Rounds: attended    Nutrition Risk Screen    Nutrition Risk Screen: no indicators present    Nutrition/Diet History    Patient Reported Diet/Restrictions/Preferences: heart healthy  Typical Food/Fluid Intake: regular diet, very poor PO intake  Food Preferences: frosted flakes and whole milk  Spiritual, Cultural Beliefs, Sikhism Practices, Values that Affect Care: no  Supplemental Drinks or Food Habits: Ensure Enlive  Food Allergies: NKFA  Factors Affecting Nutritional Intake: decreased appetite    Anthropometrics    Temp: 97.4 °F (36.3 °C)  Height Method: Stated  Height: 5' 5" (165.1 cm)  Height (inches): 65 in  Weight Method: Bed Scale  Weight: 52.1 kg (114 lb 13.8 oz)  Weight (lb): 114.86 lb  Ideal Body Weight (IBW), Female: 125 lb  % Ideal Body Weight, Female (lb): 89.59 lb  % Ideal Body Weight Malnutrition: 80-90% - mild deficit  BMI (Calculated): 18.7  BMI Grade: 18.5-24.9 - normal  Weight Loss: unintentional  Usual Body Weight (UBW), k kg  Weight Change Amount: 20 lb(Severe weight loss; >5% in <1 month)  % Usual Body Weight: 84.84  % Weight Change From Usual Weight: -15.33 %       Lab/Procedures/Meds    Pertinent Labs Reviewed: reviewed  Pertinent Labs Comments: " Hemoglobin 11.7, Hematocrit 36.4, MCH 26.4, CO2 21, Creatinine 8.7, GFR 4.1, Glucose 57, albumin 2.9  Pertinent Medications Reviewed: heparin, keppra    Estimated/Assessed Needs    Weight Used For Calorie Calculations: 50.8 kg (111 lb 15.9 oz)(dry weight )  Energy Calorie Requirements (kcal): 1778 - 2032 (35 - 40)  Energy Need Method: Kcal/kg  Protein Requirements: 61 - 76 (1.2 - 1.5 g/kg dry weight)   Weight Used For Protein Calculations: 50.8 kg (111 lb 15.9 oz)  Fluid Requirements (mL): 1000 ml + UOP /day or per MD      RDA Method (mL): 1778     Nutrition Prescription Ordered    Current Diet Order: Cardiac Diet   Oral Nutrition Supplement: Ensure Enlive Vanilla Milkshake    Evaluation of Received Nutrient/Fluid Intake    Energy Calories Required: not meeting needs  Protein Required: not meeting needs  Fluid Required: not meeting needs  % Intake of Estimated Energy Needs: 0 - 25 %  % Meal Intake: 0 - 25 %   Tolerance: not tolerating    Nutrition Risk    Level of Risk/Frequency of Follow-up: high     Assessment and Plan  Pt was asleep upon arrival. Family stated that they do not know the last time she ate a meal. Explain that she is not alert enough to eat. Pt being moved to ICU for further close monitoring    Monitor and Evaluation    Food and Nutrient Intake: energy intake, food and beverage intake  Food and Nutrient Adminstration: diet order  Physical Activity and Function: nutrition-related ADLs and IADLs  Anthropometric Measurements: weight, weight change, body mass index  Biochemical Data, Medical Tests and Procedures: electrolyte and renal panel, gastrointestinal profile, glucose/endocrine profile, inflammatory profile, lipid profile  Nutrition-Focused Physical Findings: overall appearance     Malnutrition Assessment  Malnutrition Type: chronic illness, acute illness or injury  Energy Intake: other (see comments)(<50% of estimated energy intake for >5days)          Weight Loss (Malnutrition): greater than 5%  in 1 month  Energy Intake (Malnutrition): less than or equal to 50% for greater than or equal to 5 days  Subcutaneous Fat (Malnutrition): severe depletion  Muscle Mass (Malnutrition): severe depletion   Orbital Region (Subcutaneous Fat Loss): severe depletion   [place]ian Region (Muscle Loss): moderate depletion  Clavicle Bone Region (Muscle Loss): severe depletion  Clavicle and Acromion Bone Region (Muscle Loss): severe depletion       Subcutaneous Fat Loss (Final Summary): severe protein-calorie malnutrition  Muscle Loss Evaluation (Final Summary): severe protein-calorie malnutrition    Severe Weight Loss (Malnutrition): greater than 5% in 1 month    Nutrition Follow-Up    RD Follow-up?: Yes   Chelo Asif 08/12/2019 2:50 PM

## 2019-08-12 NOTE — PROCEDURES
Physician Requesting: Dr. Lea.     Reason for EEG: Seizures.     Clinical History: 73 year-old patient with history of  Seizures presenting with worsening altered mentation.  Patient is on keppra and vimpat.     Methods:  EEG was done according to the 10/20 international system.  This is a 29 min routine EEG. Bipolar and referential montages were used.     Findings:      EEG overall symmetric with continuous polymorphic waveforms.      Background rhythm: Theta dominant while eyes are closed and the patient is awake.    PDR: 5-6Hz.   Mental status: Patient is confused and drowsy during the study.      Artifact:There is occasional eye movement, lead and ECG artifacts.      Sleep: None seen.    Epileptiform discharges: Transient spike and wave discharges.    Activation procedures:  None.    Abnormal activity: Transient generalized epileptiform actiivty. 1 Hz when present.    ECG: Regular rhythm.          Impression:  Moderate encephalopathy with recurrent epileptiform activity. No Seizures. Recommend LTM and close monitoring.

## 2019-08-12 NOTE — PROGRESS NOTES
"[facility] Medicine  Progress Note    Patient Name: Griselda Neely  MRN: [id number]  Patient Class: IP- Inpatient   Admission Date: 8/6/2019  Length of Stay: 6 days  Attending Physician: Daren Lea MD  Primary Care Provider: Kalie Neely MD        Subjective:     Principal Problem:Encephalopathy, toxic      HPI:  Griselda Neely is a 73 y.o. female with a history as below who presented to the ED from her cardiologist office via  for evaluation of altered mental status changes.  Sister at bedside reports patient has been in [facility] SN[facility] s/p discharge from hospital ~ 3 weeks ago, diagnosis unclear as family states "I know she was started on anti-seizure medications, Keppra and vimpat. Sister at bedside further states patient stopped eating and drinking on Friday.  Further reports patient with ESRD, had dialysis on Wednesday and Friday but was taken off early. Also reports went to HD on Monday.  Per chart review, patient hospitalized In July 2019 and developed acute encephalopathy during that admission, neurology was consulted with extensive work-up and non-acute findings. On admit, labs and imaging reviewed.  Admitted for acute encephalopathy. Nephrology consulted for inpatient HD.           Interval history:  Called this morning and notified by nursing staff that the pt was becoming more confused and combative.  Intermittently agitated and intermittently lethargic and significantly more confused since yesterday evening with a clear change in mental status. Also hypoglycemic for which she was started on d5. CT head, EEG ordered and neurology reconsulted. Notified at ~3pm that EEG showed epileptiform activity. Subsequently transferred to ICU for continuous EEG. Keppra and vimpat redosed per neurology.   BPs have been labile since starting HD.       Vitals:    08/12/19 0740   BP: (!) 113/56   Pulse: 71   Resp: 14   Temp:        Physical Exam   Constitutional: No distress. "   HENT:   Head: Normocephalic and atraumatic.   Eyes: Pupils are equal, round, and reactive to light. EOM are normal.   Neck: Neck supple.   No nuchal rigidity      Cardiovascular: Intact distal pulses. An irregularly irregular rhythm present.   tachycardic   Pulmonary/Chest: Effort normal. No respiratory distress.   Abdominal: Soft. There is no tenderness.   Musculoskeletal: Normal range of motion. She exhibits no edema.   Neurological: She is alert. She exhibits normal muscle tone.   Disoriented  Moving all extremities  Responds to painful stimuli  Will open eyes and attend to voice           Skin: Skin is warm and dry. Capillary refill takes less than 2 seconds.       Assessment/Plan:      * Encephalopathy, metabolic   AMS  - waxing and waning  Multifactorial - ?metabolic +/- uremia +/- infectious process  +/- hypoglycemia +/- medications induced. Patient with recent admission July 2019 and developed acute encephalopathy at that time. Neurology consulted, extensive work-up completed; started on vimpat and keppra.  CT completed and without acute findings. CXR shows persistence of predominantly ground-glass type pulmonary opacities in the right mid and lower lung zone, ?poss contributory to acute encephalopathy  - mental status worse today, she is lethargic and not responding appropriately to questions though does not appear to o/w have any focal neurologic deficits    - continue abx, will broaden coverage given worsening clinical status, re-culture  - neurology following - ?need for repeat MRI or LP  - continue keppra. Vimpat redosed per neurology.   - move to icu for closer monitoring and continuous EEG per Dr. Ramses Florentino, need to r/o non-convulsive status given hx of seizure d/o     Hypoglycemia        - poor po intake due to AMS       - start d10, frequent accuchecks        - hold insulin, hold any po antihypoglycemics     Pneumonia due to infectious organism  On admit CXR shows persistence of predominantly  ground-glass type pulmonary opacities in the right mid and lower lung zone, ?poss contributory to acute encephalitis  -Continue abx     Altered mental status  Etiology unclear. See above    HTN,             - hold po agents for now with labile pressures     Atrial fibrillation with RVR  Patient with PAF on admit telemetry showed a-fib rvr, started on cardizem gtt in ED  -Continuous cardiac monitoring   -cardizem gtt if bp will tolerate    ESRD (end stage renal disease)  Nephrology consulted for inpatient HD        VTE Risk Mitigation (From admission, onward)        Ordered     heparin (porcine) injection 5,000 Units  Every 8 hours      08/06/19 2001     Place ROSANNA hose  Until discontinued      08/06/19 2001     IP VTE HIGH RISK PATIENT  Once      08/06/19 2001                Daren Lea MD  Department of Hospital Medicine   [facility]

## 2019-08-12 NOTE — PROGRESS NOTES
Pt was not appropriate for therapy due to decreased level of arousal.  Pt's family was upset  at report of pt refusing PT instead of not being appropriate.

## 2019-08-12 NOTE — NURSING
Pt confused and combative during report and shift change and bedside report. Cursing and refusing any treatment from staff. Refusing IV site and refusing to wear tele monitor. Family at bedside stating she is getting agitated as well. TYRONE Carrillo given IM Geodon at this time. Monitor placed back on and family at bedside to help with confusion and agitation and help monitor pt for her safety. Will monitor closely.  Elyssa Michael RN

## 2019-08-12 NOTE — NURSING
Pt nonverbal, confused, combative and refusing all po medication. Pt remains hypoglycemic bg 57 after D50 12.5mg given IVP. Per Dr. Lea start pt on D10 @ 50ml/hr. Order initiated.

## 2019-08-12 NOTE — PROGRESS NOTES
Spoke with Dr. Martinez about pt's limited access at this time. He stated ok to place a midline catheter to her left upper extremity.

## 2019-08-12 NOTE — PROGRESS NOTES
[facility]  Nephrology  Progress Note    Patient Name: Griselda Neely  MRN: [id number]  Admission Date: 8/6/2019  Hospital Length of Stay: 6 days  Attending Provider: Daren Lea MD   Primary Care Physician: Kalie Neely MD  Principal Problem:Encephalopathy, toxic      Subjective:     Interval History: Alert but appears confused (minimally verbal this am); no obvious distress appreciated    Review of patient's allergies indicates:  No Known Allergies  Current Facility-Administered Medications   Medication Frequency    0.9%  NaCl infusion PRN    aspirin EC tablet 81 mg Daily    cefdinir capsule 300 mg Daily    dextrose 10 % infusion Continuous    dextrose 50% injection 12.5 g PRN    diltiaZEM 24 hr capsule 180 mg Daily    epoetin alfonzo-epbx injection 2,800 Units Every Mon, Wed, Fri    glucagon (human recombinant) injection 1 mg PRN    heparin (porcine) injection 5,000 Units Q8H    insulin aspart U-100 pen 0-5 Units Q6H PRN    isosorbide mononitrate 24 hr tablet 30 mg Daily    levETIRAcetam (KEPPRA) 500 mg in dextrose 5 % 100 mL IVPB Daily    losartan tablet 25 mg Daily    metoprolol succinate (TOPROL-XL) 24 hr tablet 50 mg Daily    sodium chloride 0.9% flush 10 mL PRN    sodium thiosulfate 12.5 gram/50 mL (250 mg/mL) injection 12.5 g Every Mon, Wed, Fri       Objective:     Vital Signs (Most Recent):  Temp: 97.4 °F (36.3 °C) (08/12/19 0336)  Pulse: 71 (08/12/19 0740)  Resp: 14 (08/12/19 0740)  BP: (!) 113/56 (08/12/19 0740)  SpO2: 97 % (08/12/19 0740)  O2 Device (Oxygen Therapy): room air (08/12/19 0336) Vital Signs (24h Range):  Temp:  [97.4 °F (36.3 °C)-98 °F (36.7 °C)] 97.4 °F (36.3 °C)  Pulse:  [] 71  Resp:  [13-22] 14  SpO2:  [94 %-97 %] 97 %  BP: (113-190)/(52-83) 113/56     Weight: 52.1 kg (114 lb 13.8 oz) (08/12/19 5041)  Body mass index is 19.11 kg/m².  Body surface area is 1.55 meters squared.    No intake/output data recorded.    Physical Exam    Constitutional: No distress.   Chronically ill & somewhat malnourished-appearing   HENT:   Head: Normocephalic and atraumatic.   Mouth/Throat: Oropharynx is clear and moist.   Eyes: Conjunctivae are normal. Right eye exhibits no discharge. Left eye exhibits no discharge. No scleral icterus.   Neck: Normal range of motion. Neck supple. No JVD present.   Cardiovascular: Normal rate. Exam reveals no gallop and no friction rub.   Murmur heard.  Irregularly irregular rhythm   Pulmonary/Chest: Effort normal and breath sounds normal. No respiratory distress. She has no rales.   Abdominal: Soft. She exhibits no distension and no mass. There is no tenderness.   Hypoactive BS in all quadrants   Genitourinary:   Genitourinary Comments: Deferred   Musculoskeletal: Normal range of motion. She exhibits no edema, tenderness or deformity.   R UE AV Fistula w/ palpable thrill   Neurological: She is alert.   Skin: Skin is warm and dry. She is not diaphoretic.   B LE Ulcers (Calciphylaxis)   Psychiatric:   Unable to assess   Nursing note and vitals reviewed.      Significant Labs:sure  CBC:   Recent Labs   Lab 08/12/19  0643   WBC 2.73*   RBC 4.43   HGB 11.7*   HCT 36.4*      MCV 82   MCH 26.4*   MCHC 32.1     CMP:   Recent Labs   Lab 08/09/19  0752  08/12/19  0643   GLU 67*   < > 57*   CALCIUM 8.7   < > 8.9   ALBUMIN 2.7*   < > 2.9*   PROT 6.2  --   --       < > 139   K 3.1*   < > 3.7   CO2 25   < > 21*   CL 96   < > 94*   BUN 19   < > 20   CREATININE 7.5*   < > 8.7*   ALKPHOS 49*  --   --    ALT 5*  --   --    AST 15  --   --    BILITOT 0.6  --   --     < > = values in this interval not displayed.       Significant Imaging:  X-Ray: Reviewed  CT: Reviewed    Assessment/Plan:     Active Diagnoses:    Diagnosis Date Noted POA    Encephalomalacia on imaging study [G93.89] 08/06/2019 Yes    Pneumonia due to infectious organism [J18.9] 08/06/2019 Yes    ESRD (end stage renal disease) [N18.6] 05/11/2016 Yes      Problems  Resolved During this Admission:    Diagnosis Date Noted Date Resolved POA    PRINCIPAL PROBLEM:  Encephalopathy, metabolic  [G92] 08/06/2019 08/09/2019 Yes    Disorientation [R41.0] 08/06/2019 08/09/2019 Yes    Hypoglycemia [E16.2] 08/06/2019 08/09/2019 Yes    Elevated lactic acid level [R79.89] 08/06/2019 08/09/2019 Yes    Altered mental status [R41.82] 08/06/2019 08/09/2019 Yes    Atrial fibrillation with RVR [I48.91] 07/18/2017 08/09/2019 Yes     1 ESRD (HD-MWF via R UE AV Fistula)- clinically stable at present; no overt volume overload, uremic signs/symptoms, electrolyte perturbations nor acid-base disturbance; no active issues    -maintenance HD (duration: 3h; UF Goal: 2-3L as tolerated;  Standard 'bath'; Access: AVF; Qb: 400 ml/min, Qd: 2x BFR; 12.5 gm of Na thiosulfate over last 30-60 min of treatment))    -DAILY renal function panel to document sCr trend, optimize electrolyte 'bath' & assess response to therapy    -avoid nephrotoxic agents (NSAIDs, IV contrast dye, MRI w/ Gadolinium constrast)    -renally dose all appropriate medications, including antibiotics     2. HTN- clinically stable at present; BP AT GOAL (113/56); no active issues    -continue current anti-HTN drug regimen (GIVE BP MEDS POST HD on HD Days) +/- HD-associated UF     3. ANEMIA- clinically stable at present; H/H AT GOAL (11.7/36.4); no active issues    -trend DAILY CBC (H/H) to effect optimal blood-loss surveillance     4. SECONDARY HYPERPARATHYROIDISM (sHPT)-- clinically stable on current therapy (Na thiosulfate 12.5 gm x last 30-60 min of HD); no active issues    -continue dietary binder/Vitamin D +/- HD-associated calcimimetic therapy (Cinacalcet vs Etelcalcetide)    Thank you for your consult. I will follow-up with patient. Please contact us if you have any additional questions.    Dane Martinez III, MD  Nephrology  On license of [facility]

## 2019-08-12 NOTE — PROGRESS NOTES
[facility]  Cardiology  Progress Note    Patient Name: Griselda Neely  MRN: [id number]  Admission Date: 8/6/2019  Hospital Length of Stay: 6 days  Code Status: Full Code   Attending Physician: Daren Lea MD   Primary Care Physician: Kalie Neely MD  Expected Discharge Date:   Principal Problem:Encephalopathy, toxic    Subjective:       Interval History: Events from the weekend noted. She apparently had nausea over the weekend. She started having altered mental status since yesterday. Due to altered mental status she has been shifted to the ICU. Currently she does not obey simple commands.     ROS   Not obtainable.    Cardiology:  Atrial fibrillation, RVR  Objective:     Vital Signs (Most Recent):  Temp: 97.4 °F (36.3 °C) (08/12/19 0336)  Pulse: 106 (08/12/19 1400)  Resp: 16 (08/12/19 1400)  BP: (!) 100/56 (08/12/19 1400)  SpO2: 97 % (08/12/19 1400) Vital Signs (24h Range):  Temp:  [97.4 °F (36.3 °C)-98 °F (36.7 °C)] 97.4 °F (36.3 °C)  Pulse:  [] 106  Resp:  [13-17] 16  SpO2:  [94 %-97 %] 97 %  BP: (100-127)/(52-71) 100/56     Weight: 52.1 kg (114 lb 13.8 oz)  Body mass index is 19.11 kg/m².    SpO2: 97 %  O2 Device (Oxygen Therapy): room air    No intake or output data in the 24 hours ending 08/12/19 1521    Lines/Drains/Airways     Drain                 Hemodialysis AV Fistula 08/08/19 0214 Right upper arm 4 days          Peripheral Intravenous Line                 Peripheral IV - Single Lumen 08/11/19 2330 22 G Anterior;Left Forearm less than 1 day                Scheduled Meds:   aspirin  81 mg Oral Daily    cefdinir  300 mg Oral Daily    diltiaZEM  180 mg Oral Daily    epoetin alfonzo-ebpx (RETACRIT) injection  50 Units/kg Intravenous Every Mon, Wed, Fri    heparin (porcine)  5,000 Units Subcutaneous Q8H    isosorbide mononitrate  30 mg Oral Daily    lacosamide (VIMPAT) IVPB  100 mg Intravenous Once    levetiracetam IVPB  500 mg Intravenous Daily    losartan  25 mg Oral  Daily    metoprolol succinate  50 mg Oral Daily    sodium thiosulfate  12.5 g Intravenous Every Mon, Wed, Fri     Continuous Infusions:   dextrose 10 % in water (D10W) 50 mL/hr at 08/12/19 1005     PRN Meds:.sodium chloride 0.9%, dextrose 50%, glucagon (human recombinant), insulin aspart U-100, metoprolol, sodium chloride 0.9%     Physical Exam     HEENT: Normocephalic, atraumatic, PERRL, Conjunctiva pink, no scleral icterus.   CVS: S1S2+, Irregular, + SM, rubs or gallops, JVP: Normal.  LUNGS: Clear  ABDOMEN: Soft, NT, BS+  EXTREMITIES: No cyanosis, clubbing or edema. Right upper arm fistula + thrill + bruit. Left thigh calciphylaxis ulcer with dressing CDI.   NEURO: Confused.         Significant Labs:   Blood Culture:   No results for input(s): LABBLOO in the last 48 hours., BMP:   Recent Labs   Lab 08/11/19  0342 08/12/19  0643   GLU 56* 57*    139   K 3.4* 3.7   CL 93* 94*   CO2 23 21*   BUN 16 20   CREATININE 6.7* 8.7*   CALCIUM 9.1 8.9   , CMP   Recent Labs   Lab 08/11/19  0342 08/12/19  0643    139   K 3.4* 3.7   CL 93* 94*   CO2 23 21*   GLU 56* 57*   BUN 16 20   CREATININE 6.7* 8.7*   CALCIUM 9.1 8.9   ALBUMIN 3.0* 2.9*   ANIONGAP 24* 24*   ESTGFRAFRICA 6.5* 4.8*   EGFRNONAA 5.7* 4.1*   , CBC   Recent Labs   Lab 08/12/19  0643   WBC 2.73*   HGB 11.7*   HCT 36.4*      , INR   No results for input(s): INR, PROTIME in the last 48 hours., Lipid Panel   No results for input(s): CHOL, HDL, LDLCALC, TRIG, CHOLHDL in the last 48 hours.,   Pathology Results  (Last 10 years)    None      , Troponin   No results for input(s): TROPONINI in the last 48 hours. and All pertinent lab results from the last 24 hours have been reviewed.    Significant Imaging:  I have reviewed the imaging all significant imaging for the last 24 hours.    EEG Impression:      This is an abnormal awake and drowsy routine EEG due to diffuse   slowing of the background activity consistent with a mild to   moderate  encephalopathy.    Assessment and Plan:     IMPRESSION:  Altered mental status. Secondary to seizure activity vs hypoglyemia. Improved. Head CT negative. EEG consistent with encephalopathy, negative for seizure activity. Waxing and waning symptoms. Etiology?   Congestive heart failure. Acute on chronic. LVEF ~40-45% on last echo 7/2019. Euvolemic on exam.   Hypoglycemia.  Calciphlyaxis. Left leg ulcer. Undergoing wound care treatments.   Atrial fibrillation. Valvular. Persistent. Patient does not wish to be on any oral anticoagulation or to be cardioverted. Currently with RVR.   Hypertension. Uncontrolled at present.   s/p MVR via right thoracotomy approach on 3/18/19 with Dr. Lora    Mildly elevated troponins in the presence of ESRD. Chronic.   CAD. Nonobstructive on LHC done on 3/13/2019.   End-stage renal disease on hemodialysis MWF. Anuric. Followed by Dr. Ingram.   Hypocalemia on calcium and Vit D supplements. Now with secondary hyperparathyroidism and calciphylaxis with lower extremity ulcers.   Hyperlipidemia  Chronic tobacco abuse.  COPD on home O2 at 2L    PLAN:  1. Start cardizem GTT.   2. Continue Lopressor.         Katharina Powell MD  Cardiology  Formerly [facility]

## 2019-08-12 NOTE — PT/OT/SLP PROGRESS
Occupational Therapy      Patient Name:  Griselda Neely   MRN:  [id number]    Patient not seen today secondary to lethargy in AM and busy with testing in PM. Will follow-up 8/13.    Jayy Collins OT  8/12/2019

## 2019-08-13 LAB
ALBUMIN SERPL BCP-MCNC: 2.6 G/DL (ref 3.5–5.2)
ALP SERPL-CCNC: 49 U/L (ref 55–135)
ALT SERPL W/O P-5'-P-CCNC: 6 U/L (ref 10–44)
AMMONIA PLAS-SCNC: 15 UMOL/L (ref 10–50)
ANION GAP SERPL CALC-SCNC: 16 MMOL/L (ref 8–16)
AST SERPL-CCNC: 14 U/L (ref 10–40)
BASOPHILS # BLD AUTO: 0.01 K/UL (ref 0–0.2)
BASOPHILS # BLD AUTO: 0.01 K/UL (ref 0–0.2)
BASOPHILS NFR BLD: 0.3 % (ref 0–1.9)
BASOPHILS NFR BLD: 0.3 % (ref 0–1.9)
BILIRUB SERPL-MCNC: 0.5 MG/DL (ref 0.1–1)
BUN SERPL-MCNC: 11 MG/DL (ref 8–23)
CALCIUM SERPL-MCNC: 7.8 MG/DL (ref 8.7–10.5)
CHLORIDE SERPL-SCNC: 97 MMOL/L (ref 95–110)
CO2 SERPL-SCNC: 22 MMOL/L (ref 23–29)
CREAT SERPL-MCNC: 5.1 MG/DL (ref 0.5–1.4)
DIFFERENTIAL METHOD: ABNORMAL
DIFFERENTIAL METHOD: ABNORMAL
EOSINOPHIL # BLD AUTO: 0.1 K/UL (ref 0–0.5)
EOSINOPHIL # BLD AUTO: 0.1 K/UL (ref 0–0.5)
EOSINOPHIL NFR BLD: 1.6 % (ref 0–8)
EOSINOPHIL NFR BLD: 2.4 % (ref 0–8)
ERYTHROCYTE [DISTWIDTH] IN BLOOD BY AUTOMATED COUNT: 22.5 % (ref 11.5–14.5)
ERYTHROCYTE [DISTWIDTH] IN BLOOD BY AUTOMATED COUNT: 22.9 % (ref 11.5–14.5)
EST. GFR  (AFRICAN AMERICAN): 9.1 ML/MIN/1.73 M^2
EST. GFR  (NON AFRICAN AMERICAN): 7.9 ML/MIN/1.73 M^2
GLUCOSE SERPL-MCNC: 106 MG/DL (ref 70–110)
GLUCOSE SERPL-MCNC: 107 MG/DL (ref 70–110)
GLUCOSE SERPL-MCNC: 131 MG/DL (ref 70–110)
GLUCOSE SERPL-MCNC: 161 MG/DL (ref 70–110)
GLUCOSE SERPL-MCNC: 60 MG/DL (ref 70–110)
GLUCOSE SERPL-MCNC: 62 MG/DL (ref 70–110)
GLUCOSE SERPL-MCNC: 72 MG/DL (ref 70–110)
GLUCOSE SERPL-MCNC: 90 MG/DL (ref 70–110)
GLUCOSE SERPL-MCNC: 93 MG/DL (ref 70–110)
HCT VFR BLD AUTO: 27.9 % (ref 37–48.5)
HCT VFR BLD AUTO: 29.2 % (ref 37–48.5)
HGB BLD-MCNC: 9.1 G/DL (ref 12–16)
HGB BLD-MCNC: 9.9 G/DL (ref 12–16)
IMM GRANULOCYTES # BLD AUTO: 0.01 K/UL (ref 0–0.04)
IMM GRANULOCYTES # BLD AUTO: 0.03 K/UL (ref 0–0.04)
IMM GRANULOCYTES NFR BLD AUTO: 0.3 % (ref 0–0.5)
IMM GRANULOCYTES NFR BLD AUTO: 1 % (ref 0–0.5)
LACOSAMIDE SERPL-MCNC: 1.6 UG/ML (ref 5–10)
LYMPHOCYTES # BLD AUTO: 0.5 K/UL (ref 1–4.8)
LYMPHOCYTES # BLD AUTO: 0.8 K/UL (ref 1–4.8)
LYMPHOCYTES NFR BLD: 17.6 % (ref 18–48)
LYMPHOCYTES NFR BLD: 23.4 % (ref 18–48)
MAGNESIUM SERPL-MCNC: 1.6 MG/DL (ref 1.6–2.6)
MCH RBC QN AUTO: 26.2 PG (ref 27–31)
MCH RBC QN AUTO: 26.6 PG (ref 27–31)
MCHC RBC AUTO-ENTMCNC: 32.6 G/DL (ref 32–36)
MCHC RBC AUTO-ENTMCNC: 33.9 G/DL (ref 32–36)
MCV RBC AUTO: 79 FL (ref 82–98)
MCV RBC AUTO: 80 FL (ref 82–98)
MONOCYTES # BLD AUTO: 0.5 K/UL (ref 0.3–1)
MONOCYTES # BLD AUTO: 0.6 K/UL (ref 0.3–1)
MONOCYTES NFR BLD: 16.3 % (ref 4–15)
MONOCYTES NFR BLD: 17.3 % (ref 4–15)
NEUTROPHILS # BLD AUTO: 1.9 K/UL (ref 1.8–7.7)
NEUTROPHILS # BLD AUTO: 1.9 K/UL (ref 1.8–7.7)
NEUTROPHILS NFR BLD: 57.3 % (ref 38–73)
NEUTROPHILS NFR BLD: 62.2 % (ref 38–73)
NRBC BLD-RTO: 1 /100 WBC
NRBC BLD-RTO: 1 /100 WBC
PHOSPHATE SERPL-MCNC: 1.9 MG/DL (ref 2.7–4.5)
PLATELET # BLD AUTO: 119 K/UL (ref 150–350)
PLATELET # BLD AUTO: 126 K/UL (ref 150–350)
PMV BLD AUTO: 8.9 FL (ref 9.2–12.9)
PMV BLD AUTO: 9.8 FL (ref 9.2–12.9)
POTASSIUM SERPL-SCNC: 2.8 MMOL/L (ref 3.5–5.1)
PROT SERPL-MCNC: 6 G/DL (ref 6–8.4)
RBC # BLD AUTO: 3.47 M/UL (ref 4–5.4)
RBC # BLD AUTO: 3.72 M/UL (ref 4–5.4)
SODIUM SERPL-SCNC: 135 MMOL/L (ref 136–145)
VANCOMYCIN SERPL-MCNC: 12.6 UG/ML
WBC # BLD AUTO: 3.07 K/UL (ref 3.9–12.7)
WBC # BLD AUTO: 3.38 K/UL (ref 3.9–12.7)

## 2019-08-13 PROCEDURE — 82962 GLUCOSE BLOOD TEST: CPT

## 2019-08-13 PROCEDURE — 80202 ASSAY OF VANCOMYCIN: CPT

## 2019-08-13 PROCEDURE — 25000003 PHARM REV CODE 250

## 2019-08-13 PROCEDURE — 85025 COMPLETE CBC W/AUTO DIFF WBC: CPT

## 2019-08-13 PROCEDURE — 25000003 PHARM REV CODE 250: Performed by: NURSE PRACTITIONER

## 2019-08-13 PROCEDURE — C9254 INJECTION, LACOSAMIDE: HCPCS | Performed by: NURSE PRACTITIONER

## 2019-08-13 PROCEDURE — 83735 ASSAY OF MAGNESIUM: CPT

## 2019-08-13 PROCEDURE — 63600175 PHARM REV CODE 636 W HCPCS: Performed by: INTERNAL MEDICINE

## 2019-08-13 PROCEDURE — 80053 COMPREHEN METABOLIC PANEL: CPT

## 2019-08-13 PROCEDURE — 63600175 PHARM REV CODE 636 W HCPCS: Performed by: HOSPITALIST

## 2019-08-13 PROCEDURE — 80339 ANTIEPILEPTICS NOS 1-3: CPT

## 2019-08-13 PROCEDURE — 63600175 PHARM REV CODE 636 W HCPCS: Performed by: NURSE PRACTITIONER

## 2019-08-13 PROCEDURE — 95819 EEG AWAKE AND ASLEEP: CPT

## 2019-08-13 PROCEDURE — 82140 ASSAY OF AMMONIA: CPT

## 2019-08-13 PROCEDURE — 80177 DRUG SCRN QUAN LEVETIRACETAM: CPT

## 2019-08-13 PROCEDURE — 95951 HC EEG MONITORING/VIDEO RECORD: CPT

## 2019-08-13 PROCEDURE — 84100 ASSAY OF PHOSPHORUS: CPT

## 2019-08-13 PROCEDURE — 20000000 HC ICU ROOM

## 2019-08-13 PROCEDURE — 94761 N-INVAS EAR/PLS OXIMETRY MLT: CPT

## 2019-08-13 PROCEDURE — 63600175 PHARM REV CODE 636 W HCPCS: Performed by: PSYCHIATRY & NEUROLOGY

## 2019-08-13 PROCEDURE — 36415 COLL VENOUS BLD VENIPUNCTURE: CPT

## 2019-08-13 PROCEDURE — 25800024 PHARM REV CODE 258: Performed by: INTERNAL MEDICINE

## 2019-08-13 RX ORDER — ONDANSETRON 2 MG/ML
4 INJECTION INTRAMUSCULAR; INTRAVENOUS EVERY 4 HOURS PRN
Status: DISCONTINUED | OUTPATIENT
Start: 2019-08-13 | End: 2019-08-19 | Stop reason: HOSPADM

## 2019-08-13 RX ORDER — MAGNESIUM SULFATE HEPTAHYDRATE 40 MG/ML
2 INJECTION, SOLUTION INTRAVENOUS ONCE
Status: COMPLETED | OUTPATIENT
Start: 2019-08-13 | End: 2019-08-13

## 2019-08-13 RX ORDER — POTASSIUM CHLORIDE 14.9 MG/ML
40 INJECTION INTRAVENOUS ONCE
Status: COMPLETED | OUTPATIENT
Start: 2019-08-13 | End: 2019-08-13

## 2019-08-13 RX ORDER — DEXTROSE MONOHYDRATE 50 MG/ML
INJECTION, SOLUTION INTRAVENOUS CONTINUOUS
Status: DISCONTINUED | OUTPATIENT
Start: 2019-08-13 | End: 2019-08-13

## 2019-08-13 RX ORDER — CHLORHEXIDINE GLUCONATE ORAL RINSE 1.2 MG/ML
15 SOLUTION DENTAL 2 TIMES DAILY
Status: DISPENSED | OUTPATIENT
Start: 2019-08-13 | End: 2019-08-18

## 2019-08-13 RX ORDER — VANCOMYCIN HCL IN 5 % DEXTROSE 1G/250ML
1000 PLASTIC BAG, INJECTION (ML) INTRAVENOUS ONCE
Status: COMPLETED | OUTPATIENT
Start: 2019-08-13 | End: 2019-08-13

## 2019-08-13 RX ORDER — MUPIROCIN 20 MG/G
OINTMENT TOPICAL 2 TIMES DAILY
Status: COMPLETED | OUTPATIENT
Start: 2019-08-13 | End: 2019-08-17

## 2019-08-13 RX ADMIN — DEXTROSE MONOHYDRATE 12.5 G: 500 INJECTION PARENTERAL at 02:08

## 2019-08-13 RX ADMIN — SODIUM CHLORIDE 50 MG: 9 INJECTION, SOLUTION INTRAVENOUS at 11:08

## 2019-08-13 RX ADMIN — DEXTROSE 50 ML: 20 INJECTION, SOLUTION INTRAVENOUS at 06:08

## 2019-08-13 RX ADMIN — VANCOMYCIN HYDROCHLORIDE 1000 MG: 1 INJECTION, POWDER, LYOPHILIZED, FOR SOLUTION INTRAVENOUS at 08:08

## 2019-08-13 RX ADMIN — PIPERACILLIN AND TAZOBACTAM 4.5 G: 4; .5 INJECTION, POWDER, LYOPHILIZED, FOR SOLUTION INTRAVENOUS; PARENTERAL at 08:08

## 2019-08-13 RX ADMIN — MUPIROCIN: 20 OINTMENT TOPICAL at 01:08

## 2019-08-13 RX ADMIN — DEXTROSE MONOHYDRATE 12.5 G: 500 INJECTION PARENTERAL at 05:08

## 2019-08-13 RX ADMIN — MAGNESIUM SULFATE 2 G: 2 INJECTION INTRAVENOUS at 04:08

## 2019-08-13 RX ADMIN — DEXTROSE MONOHYDRATE 250 MG: 50 INJECTION, SOLUTION INTRAVENOUS at 11:08

## 2019-08-13 RX ADMIN — LEVETIRACETAM 250 MG: 100 INJECTION, SOLUTION, CONCENTRATE INTRAVENOUS at 01:08

## 2019-08-13 RX ADMIN — DEXTROSE 50 ML: 20 INJECTION, SOLUTION INTRAVENOUS at 08:08

## 2019-08-13 RX ADMIN — POTASSIUM CHLORIDE 40 MEQ: 14.9 INJECTION, SOLUTION INTRAVENOUS at 04:08

## 2019-08-13 RX ADMIN — MUPIROCIN: 20 OINTMENT TOPICAL at 08:08

## 2019-08-13 RX ADMIN — HEPARIN SODIUM 5000 UNITS: 5000 INJECTION INTRAVENOUS; SUBCUTANEOUS at 05:08

## 2019-08-13 RX ADMIN — ONDANSETRON 4 MG: 2 INJECTION INTRAMUSCULAR; INTRAVENOUS at 08:08

## 2019-08-13 RX ADMIN — HEPARIN SODIUM 5000 UNITS: 5000 INJECTION INTRAVENOUS; SUBCUTANEOUS at 08:08

## 2019-08-13 RX ADMIN — LEVETIRACETAM 500 MG: 100 INJECTION, SOLUTION, CONCENTRATE INTRAVENOUS at 09:08

## 2019-08-13 RX ADMIN — HEPARIN SODIUM 5000 UNITS: 5000 INJECTION INTRAVENOUS; SUBCUTANEOUS at 02:08

## 2019-08-13 NOTE — PROGRESS NOTES
1330 Called for pt to come to dialysis, pt having EEG done at bedside will call back and check with nurse if pt is done .     1415 Called for pt again Nelia HANSEN stated pt is to go to ICU for continue EEG monitoring and BP is in the 90's sbp.     1420 called house supervisor to see when pt is going to ICU. House supervisor stated there are no orders for ICU and was asked if pt could come to dialysis while waiting for a bed and orders since it is just monitoring. Received ok and proceeded to call charge nurse of cardio A let her know that I had spoke to house supervisor and she asked me to call neuro to make sure it was ok to send for pt.     1430 Called Maggie Osuna NP neuro she was unsure pt was going to ICU. She said she would call back with an answer.      1431 Maggie MIDDLETON called back and said it was ok for pt to go to hemodialysis. I called charge nurse back and let her know she in turn wanted to talk to maggie MIDDLETON and would call me back.    1436 Received called from Charge nurse a cardio A stating pt was ok to come to hemodialysis. Asked charge nurse to tell floor nurse we are ready for the pt.     1500 Called floor again they said pt is on her way.   1510   Pt arrives to hemodialysis and Dr. Lea Walks in to dialysis and said pt has ICU orders now and has a room. Cardio A was called and spoke to Nelia HANSEN asked her to come and take the pt to ICU. Called back a second time and they said again they would be up there. Called [facility]u to see if they could come and get her they where not able to. So myself and issa pushed pt down to [facility] and found out she had a seizure on EEG at 1300 and blood sugar in the 60's and no one notified us.

## 2019-08-13 NOTE — PROGRESS NOTES
VANCOMYCIN PHARMACOKINETIC NOTE:  Vancomycin Day # 1    Objective/Assessment:    Diagnosis/Indication for Vancomycin:  Lower respiratory infections    73 y.o., female; Actual Body Weight = 52.1 kg (114 lb 13.8 oz).    The patient has the following labs:     8/12/2019 Estimated Creatinine Clearance: 9.1 mL/min (A) (based on SCr of 4.6 mg/dL (H)). Lab Results   Component Value Date    BUN 12 08/12/2019     Lab Results   Component Value Date    WBC 3.34 (L) 08/12/2019        Patient is receiving hemodialysis therapy.    Plan for Dialysis Patient:      Vancomycin dose will not be given until new trough level is drawn, because patient received 1000 mg on 8-10-19, and the CrCl is only 9.1.   Trough level has been ordered for 10-13-19,  With AM labs,      Pharmacy will manage vancomycin therapy, monitor serum vancomycin levels, monitor renal function and adjust regimen as necessary.    Will follow random vancomycin levels and redose when serum vancomycin level decreases to 10-15 mcg/mL    Thank you for allowing us to participate in this patient's care.     Carlos Robertson 8/12/2019 10:48 PM  Department of Pharmacy  Ext [phone]

## 2019-08-13 NOTE — ANESTHESIA PROCEDURE NOTES
Central Line    Diagnosis: a fib with rvr and no iv access  Patient location during procedure: ICU  Procedure start time: 8/12/2019 7:21 PM  Timeout: 8/12/2019 7:20 PM  Procedure end time: 8/12/2019 7:40 PM    Staffing  Authorizing Provider: Jarrod Saab Jr., MD  Performing Provider: Jarrod Saab Jr., MD    Staffing  Anesthesiologist: Jarrod Saab Jr., MD  Performed: anesthesiologist   Anesthesiologist was present at the time of the procedure.  Preanesthetic Checklist  Completed: patient identified, site marked, surgical consent, pre-op evaluation, timeout performed, IV checked, risks and benefits discussed, monitors and equipment checked and anesthesia consent given  Indication   Indication: hemodynamic monitoring, vascular access, med administration     Anesthesia   local infiltration    Central Line   Skin Prep: skin prepped with ChloraPrep, skin prep agent completely dried prior to procedure  maximum sterile barriers used during central venous catheter insertion  hand hygiene performed prior to central venous catheter insertion  Location: right, internal jugular.   Catheter type: triple lumen  Catheter Size: 7.5 Fr  Inserted Catheter Length: 15 cm  Ultrasound: vascular probe with ultrasound  Vessel Caliber: medium, patent, compressibility normal  Needle advanced into vessel with real time Ultrasound guidance.  Guidewire confirmed in vessel.  Manometry: none  Insertion Attempts: 1   Securement:line sutured, chlorhexidine patch, sterile dressing applied and blood return through all ports    Post-Procedure   X-Ray: no pneumothorax on x-ray, placement verified by x-ray and successful placement  Adverse Events:none    Guidewire Guidewire removed intact. Guidewire removed intact, verified with nurse.  Additional Notes  Guidewire removed. Sutured at 15 cm at the skin. All ports aspirate dark venous non pulsatile blood that falls with gravity. All ports flush easily. Chest x- ray revealed good placement  and no pneumothorax noted. Ok to use central line.

## 2019-08-13 NOTE — PT/OT/SLP PROGRESS
Physical Therapy      Patient Name:  Griselda Neely   MRN:  [id number]    Patient not seen today secondary to Other (Comment)(Pt transferred to ICU, need new orders once medically stable. ). Will follow-up 08/14/19      Jackie Toro PT

## 2019-08-13 NOTE — PROGRESS NOTES
[facility]  Nephrology  Progress Note    Patient Name: Griselda Neely  MRN: [id number]  Admission Date: 8/6/2019  Hospital Length of Stay: 7 days  Attending Provider: Daren Lea MD   Primary Care Physician: Kalie Neely MD  Principal Problem:Encephalopathy, toxic      Subjective:     Interval History: Alert but  confused & restless (minimally verbal this am); no obvious distress appreciated    Review of patient's allergies indicates:  No Known Allergies  Current Facility-Administered Medications   Medication Frequency    0.9%  NaCl infusion PRN    aspirin EC tablet 81 mg Daily    dextrose 20 % infusion Continuous    dextrose 50% injection 12.5 g PRN    diltiaZEM 125 mg in D5W 125 mL infusion Continuous    epoetin alfonzo-epbx injection 2,800 Units Every Mon, Wed, Fri    glucagon (human recombinant) injection 1 mg PRN    heparin (porcine) injection 5,000 Units Q8H    insulin aspart U-100 pen 0-5 Units Q6H PRN    levETIRAcetam (KEPPRA) 500 mg in dextrose 5 % 100 mL IVPB Daily    metoprolol injection 5 mg Q6H PRN    norepinephrine 1 mg/mL injection     norepinephrine 4 mg in dextrose 5 % 250 mL infusion Continuous    piperacillin-tazobactam (ZOSYN) 0.75 g in dextrose 5 % 100 mL PRN    piperacillin-tazobactam 4.5 g in dextrose 5 % 100 mL IVPB (ready to mix system) Q12H    sodium chloride 0.9% flush 10 mL PRN    sodium thiosulfate 12.5 gram/50 mL (250 mg/mL) injection 12.5 g Every Mon, Wed, Fri    vancomycin in dextrose 5 % 1 gram/250 mL IVPB 1,000 mg Once       Objective:     Vital Signs (Most Recent):  Temp: 97 °F (36.1 °C) (08/13/19 0315)  Pulse: 72 (08/12/19 2241)  Resp: 18 (08/12/19 2241)  BP: (!) 148/85 (08/12/19 2033)  SpO2: 95 % (08/12/19 2241)  O2 Device (Oxygen Therapy): room air (08/12/19 2241) Vital Signs (24h Range):  Temp:  [97 °F (36.1 °C)-97.8 °F (36.6 °C)] 97 °F (36.1 °C)  Pulse:  [] 72  Resp:  [13-37] 18  SpO2:  [70 %-100 %] 95 %  BP: ()/()  148/85     Weight: 52.1 kg (114 lb 13.8 oz) (08/12/19 0508)  Body mass index is 19.11 kg/m².  Body surface area is 1.55 meters squared.    No intake/output data recorded.    Physical Exam   Constitutional: No distress.   Chronically ill & somewhat malnourished-appearing   HENT:   Head: Normocephalic and atraumatic.   Mouth/Throat: Oropharynx is clear and moist.   Eyes: Conjunctivae are normal. Right eye exhibits no discharge. Left eye exhibits no discharge. No scleral icterus.   Neck: Normal range of motion. Neck supple. No JVD present.   Cardiovascular: Normal rate. Exam reveals no gallop and no friction rub.   Murmur heard.  Irregularly irregular rhythm   Pulmonary/Chest: Effort normal and breath sounds normal. No respiratory distress. She has no rales.   Abdominal: Soft. She exhibits no distension and no mass. There is no tenderness.   Hypoactive BS in all quadrants   Genitourinary:   Genitourinary Comments: Deferred   Musculoskeletal: Normal range of motion. She exhibits no edema, tenderness or deformity.   R UE AV Fistula w/ palpable thrill   Neurological: She is alert.   Skin: Skin is warm and dry. She is not diaphoretic.   B LE Ulcers (Calciphylaxis)   Psychiatric:   Unable to assess   Nursing note and vitals reviewed.      Significant Labs:sure  CBC:   Recent Labs   Lab 08/13/19  0303   WBC 3.07*   RBC 3.47*   HGB 9.1*   HCT 27.9*   *   MCV 80*   MCH 26.2*   MCHC 32.6     CMP:   Recent Labs   Lab 08/13/19  0303      CALCIUM 7.8*   ALBUMIN 2.6*   PROT 6.0   *   K 2.8*   CO2 22*   CL 97   BUN 11   CREATININE 5.1*   ALKPHOS 49*   ALT 6*   AST 14   BILITOT 0.5       Significant Imaging:  X-Ray: Reviewed  CT: Reviewed    Assessment/Plan:     Active Diagnoses:    Diagnosis Date Noted POA    Altered mental status [R41.82] 08/12/2019 Yes    Encephalomalacia on imaging study [G93.89] 08/06/2019 Yes    Pneumonia due to infectious organism [J18.9] 08/06/2019 Yes    ESRD (end stage renal disease)  [N18.6] 05/11/2016 Yes      Problems Resolved During this Admission:    Diagnosis Date Noted Date Resolved POA    PRINCIPAL PROBLEM:  Encephalopathy, metabolic  [G92] 08/06/2019 08/09/2019 Yes    Disorientation [R41.0] 08/06/2019 08/09/2019 Yes    Hypoglycemia [E16.2] 08/06/2019 08/09/2019 Yes    Elevated lactic acid level [R79.89] 08/06/2019 08/09/2019 Yes    Altered mental status [R41.82] 08/06/2019 08/09/2019 Yes    Atrial fibrillation with RVR [I48.91] 07/18/2017 08/09/2019 Yes     1 ESRD (HD-MWF via R UE AV Fistula)- clinically stable at present; no overt volume overload, uremic signs/symptoms, electrolyte perturbations nor acid-base disturbance; no active issues    -maintenance HD (duration: 3h; UF Goal: 2-3L as tolerated;  Standard 'bath'; Access: AVF; Qb: 400 ml/min, Qd: 2x BFR; 12.5 gm of Na thiosulfate over last 30-60 min of treatment))    -DAILY renal function panel to document sCr trend, optimize electrolyte 'bath' & assess response to therapy    -avoid nephrotoxic agents (NSAIDs, IV contrast dye, MRI w/ Gadolinium constrast)    -renally dose all appropriate medications, including antibiotics    2. HYPOKALEMIA (2.8)- clinically stable at present (s/p IV repletion); no active issues    -agree w/ above therapy; check serum Magnesium level to exclude hypomagnesemia-induced hypokalemia     3. HTN- clinically stable at present; BP AT GOAL (148/85); no active issues    -continue current anti-HTN drug regimen (GIVE BP MEDS POST HD on HD Days) +/- HD-associated UF     4. ANEMIA- clinically stable at present; H/H AT GOAL (9.1/27.9); no active issues    -trend DAILY CBC (H/H) to effect optimal blood-loss surveillance     5. SECONDARY HYPERPARATHYROIDISM (sHPT)-- clinically stable on current therapy (Na thiosulfate 12.5 gm x last 30-60 min of HD); no active issues    -continue dietary binder/Vitamin D +/- HD-associated calcimimetic therapy (Cinacalcet vs Etelcalcetide)    6. ALTERED MENTAL STATUS- remains  acutely confused & markedly restless this am; etiology likely multifactorial; Neurology service on the case    -management per Neurology/Primary team recommendations    Thank you for your consult. I will follow-up with patient. Please contact us if you have any additional questions.    Dane Martinez III, MD  [facility]  Critical access hospital  [phone] (C)

## 2019-08-13 NOTE — PROGRESS NOTES
Notified Dr. Snyder of B despite getting D20 at 50ml/hr. Informed MD that 12.5 of D50 given as ordered on MAR. No new orders obtained. Continuing to monitor pt closely.

## 2019-08-13 NOTE — PROGRESS NOTES
[facility]  Neurology  Progress Note    Patient Name: Griselda Neely  MRN: [id number]  Admission Date: 8/6/2019  Hospital Length of Stay: 7 days  Code Status: Full Code   Attending Provider: Amos Snyder MD  Primary Care Physician: Kalie Neely MD   Principal Problem:Encephalopathy, toxic    Subjective:     Interval History: Patient seen and examined this am. She was able to tell me her name and that she is in the hospital. Otherwise did not answer any questions and said Lord have mercy multiple times. LTM EEG still ongoing.     Current Neurological Medications: MAR reviewed    HPI: Griselda Neely is a 73 y.o. female with a history as below who presented to the ED from her cardiologist office via  for evaluation of altered mental status changes.  Sister at bedside reports patient has been in [facility] s/p discharge from hospital ~ 3 weeks ago due to encephalopathy. Pt was started on Keppra and Vimpat during that admission. Sister at bedside further states patient stopped eating and drinking on Friday. Further reports patient with ESRD, had dialysis on Wednesday and Friday but was taken off early. Also reports went to HD on Monday.  Per chart review, patient hospitalized In July 2019 and developed acute encephalopathy during that admission, neurology was consulted with extensive work-up and non-acute findings. On admit, labs and imaging reviewed.  Neurology consulted with recs for MRI/MRA and EEG.     CRT: 6.7  K: 3.4  LDL: 55.2  Ammonia: 18  B12: 1045  TSH: 2.7  AED levels: pending      Brain imaging:  CT head 8/12/19: 1. No acute intracranial abnormality.  2. Chronic small vessel ischemic changes.     CT head:   No acute intracranial abnormality.        EEG: This is an abnormal awake and drowsy routine EEG due to diffuse slowing of the background activity consistent with a mild to moderate encephalopathy.       Current Facility-Administered Medications   Medication Dose Route  Frequency Provider Last Rate Last Dose    0.9%  NaCl infusion   Intravenous PRN Dane Martinez MD        aspirin EC tablet 81 mg  81 mg Oral Daily TODD Rush   81 mg at 08/11/19 1003    dextrose 20 % infusion   Intravenous Continuous Daren Lea MD 50 mL/hr at 08/13/19 0831 50 mL at 08/13/19 0831    dextrose 50% injection 12.5 g  12.5 g Intravenous PRN TODD Rush   12.5 g at 08/12/19 1657    diltiaZEM 125 mg in D5W 125 mL infusion  5 mg/hr Intravenous Continuous Katharina Powell MD 5 mL/hr at 08/12/19 1830 5 mg/hr at 08/12/19 1830    epoetin alfonzo-epbx injection 2,800 Units  50 Units/kg Intravenous Every Mon, Wed, Fri Chalino Victoria MD   2,800 Units at 08/12/19 2022    glucagon (human recombinant) injection 1 mg  1 mg Intramuscular PRN TODD Rush        heparin (porcine) injection 5,000 Units  5,000 Units Subcutaneous Q8H TODD Rush   5,000 Units at 08/13/19 0830    insulin aspart U-100 pen 0-5 Units  0-5 Units Subcutaneous Q6H PRN TODD Rush        levETIRAcetam (KEPPRA) 500 mg in dextrose 5 % 100 mL IVPB  500 mg Intravenous Daily Daren Lea  mL/hr at 08/12/19 1005 500 mg at 08/12/19 1005    metoprolol injection 5 mg  5 mg Intravenous Q6H PRN Daren Lea MD   5 mg at 08/12/19 1658    norepinephrine 4 mg in dextrose 5 % 250 mL infusion  0.02 mcg/kg/min Intravenous Continuous Daren Lea MD   Stopped at 08/12/19 2000    piperacillin-tazobactam (ZOSYN) 0.75 g in dextrose 5 % 100 mL  0.75 g Intravenous PRN Daren Lea MD        piperacillin-tazobactam 4.5 g in dextrose 5 % 100 mL IVPB (ready to mix system)  4.5 g Intravenous Q12H Daren Lea MD 25 mL/hr at 08/13/19 0831 4.5 g at 08/13/19 0831    sodium chloride 0.9% flush 10 mL  10 mL Intravenous PRN Amrit Liu, TODD        sodium thiosulfate 12.5 gram/50 mL (250 mg/mL) injection 12.5 g  12.5 g Intravenous Every Mon, Wed, Fri  Dane Martinez MD   12.5 g at 08/12/19 2023    vancomycin in dextrose 5 % 1 gram/250 mL IVPB 1,000 mg  1,000 mg Intravenous Once Daren Lea MD   1,000 mg at 08/13/19 0831       Review of Systems   Unable to perform ROS: Acuity of condition     Objective:     Vital Signs (Most Recent):  Temp: 97 °F (36.1 °C) (08/13/19 0315)  Pulse: 72 (08/12/19 2241)  Resp: 18 (08/12/19 2241)  BP: (!) 148/85 (08/12/19 2033)  SpO2: 95 % (08/12/19 2241) Vital Signs (24h Range):  Temp:  [97 °F (36.1 °C)-97.8 °F (36.6 °C)] 97 °F (36.1 °C)  Pulse:  [] 72  Resp:  [13-37] 18  SpO2:  [70 %-100 %] 95 %  BP: ()/() 148/85     Weight: 52.1 kg (114 lb 13.8 oz)  Body mass index is 19.11 kg/m².    Physical Exam   Constitutional: She appears well-developed and well-nourished.   HENT:   Head: Normocephalic and atraumatic.   Eyes: Pupils are equal, round, and reactive to light. EOM are normal.   Neck: Normal range of motion. Neck supple.   Cardiovascular: Normal rate, regular rhythm and normal heart sounds.   Pulmonary/Chest: Effort normal and breath sounds normal.   Abdominal: Soft.   Musculoskeletal: Normal range of motion.   Neurological: She is alert.   Skin: Skin is warm and dry.   Psychiatric: Her speech is normal.   Vitals reviewed.      NEUROLOGICAL EXAMINATION:     MENTAL STATUS   Oriented to person.   Oriented to place.   Disoriented to time.   Speech: speech is normal   Level of consciousness: alert       Patient awake, knows her name and where she is at.  Intermittently follows commands  QUEEN spontaneously     CRANIAL NERVES     CN III, IV, VI   Pupils are equal, round, and reactive to light.  Extraocular motions are normal.       Significant Labs: All pertinent lab results from the past 24 hours have been reviewed.    Significant Imaging: I have reviewed all pertinent imaging results/findings within the past 24 hours.    Assessment and Plan:   Encephalopathy  -Likely metabolic, multifactorial including  dehydration, infection, and hypokalemia  -EEG: P  -B12, TSH, Folate, Ammonia WNL  -Previous MRI brain done July 2019 negative acute, shows remote ischemia      History of seizures  -Abnormal EEGs on previous admit and pt started on Vimpat and Keppra  -EEG: P, and repeat ordered for tomorrow 8/14/19  -Increase keppra to 750mg daily, given an additional 500mg after dialysis  -Continue Vimpat 50mg daily  -Start depakote 250mg in the am and 500mg in the pm  - monitor in ICU one more day    Case discussed with Dr. Carrasco        ESRD  -Nephrology following      Pneumonia   -Management per IM     Seizure education.    No driving for now, no swimming alone, no climbing high areas, no operation of heavy machinery or working with high risk electricity equipment.  Continue to take AEDs as prescribed. If any major side effects from neurological medications go to the ED including mood changes and severe depression.  Patient and/or next of kin informed.  Follow up Neurology in 2 weeks at [phone].  Medication side effects discussed with the patient and/or caregiver.  Active Diagnoses:    Diagnosis Date Noted POA    Altered mental status [R41.82] 08/12/2019 Yes    Encephalomalacia on imaging study [G93.89] 08/06/2019 Yes    Pneumonia due to infectious organism [J18.9] 08/06/2019 Yes    ESRD (end stage renal disease) [N18.6] 05/11/2016 Yes      Problems Resolved During this Admission:    Diagnosis Date Noted Date Resolved POA    PRINCIPAL PROBLEM:  Encephalopathy, metabolic  [G92] 08/06/2019 08/09/2019 Yes    Disorientation [R41.0] 08/06/2019 08/09/2019 Yes    Hypoglycemia [E16.2] 08/06/2019 08/09/2019 Yes    Elevated lactic acid level [R79.89] 08/06/2019 08/09/2019 Yes    Altered mental status [R41.82] 08/06/2019 08/09/2019 Yes    Atrial fibrillation with RVR [I48.91] 07/18/2017 08/09/2019 Yes       VTE Risk Mitigation (From admission, onward)        Ordered     heparin (porcine) injection 5,000 Units  Every 8 hours       08/06/19 2001     Place ROSANNA hose  Until discontinued      08/06/19 2001     IP VTE HIGH RISK PATIENT  Once      08/06/19 2001          Maggie Blood, KANE  Neurology  On license of [facility]    I, Dr. Ramses Florentino, discussed care with my advanced practitioner and agree with above. I have reviewed patient clinical presentation, work up, impression and plan.

## 2019-08-13 NOTE — PROGRESS NOTES
[facility] Medicine  Progress Note    Patient Name: Griselda Neely  MRN: [id number]  Patient Class: IP- Inpatient   Admission Date: 8/6/2019  Length of Stay: 7 days  Attending Physician: Amos Snyder MD  Primary Care Provider: Kalie Neely MD        Subjective:     Principal Problem:Encephalopathy, toxic    Interval History: Seen and examined, still confused,requiiring    Review of Systems  Objective:     Vital Signs (Most Recent):  Temp: 97 °F (36.1 °C) (08/13/19 1200)  Pulse: 84 (08/13/19 1600)  Resp: 15 (08/13/19 1600)  BP: (!) 154/69 (08/13/19 1600)  SpO2: 99 % (08/13/19 1600) Vital Signs (24h Range):  Temp:  [97 °F (36.1 °C)-97.8 °F (36.6 °C)] 97 °F (36.1 °C)  Pulse:  [] 84  Resp:  [13-40] 15  SpO2:  [91 %-100 %] 99 %  BP: (102-180)/() 154/69     Weight: 52.1 kg (114 lb 13.8 oz)  Body mass index is 19.11 kg/m².    Intake/Output Summary (Last 24 hours) at 8/13/2019 1856  Last data filed at 8/12/2019 2033  Gross per 24 hour   Intake 650 ml   Output 586 ml   Net 64 ml      Physical Exam  General, confused agitated, still requiring restraints  Lungs clear to auscultation bilateral  Heart, regular rhythm and rate  Abdomen, soft nontender nondistended  Extremities she does have a decubitus sacral ulcer, she also has a right posterior leg ulcer covered with dressings          Significant Labs:   BMP:   Recent Labs   Lab 08/13/19  0303      *   K 2.8*   CL 97   CO2 22*   BUN 11   CREATININE 5.1*   CALCIUM 7.8*   MG 1.6     CBC:   Recent Labs   Lab 08/12/19  0643 08/12/19  1920 08/13/19  0303   WBC 2.73* 3.34* 3.07*   HGB 11.7* 10.7* 9.1*   HCT 36.4* 32.5* 27.9*    151 126*         Assessment/Plan:      Active Diagnoses:    Diagnosis Date Noted POA    Altered mental status [R41.82] 08/12/2019 Yes    Encephalomalacia on imaging study [G93.89] 08/06/2019 Yes    Pneumonia due to infectious organism [J18.9] 08/06/2019 Yes    ESRD (end stage renal  disease) [N18.6] 05/11/2016 Yes      Problems Resolved During this Admission:    Diagnosis Date Noted Date Resolved POA    PRINCIPAL PROBLEM:  Encephalopathy, metabolic  [G92] 08/06/2019 08/09/2019 Yes    Disorientation [R41.0] 08/06/2019 08/09/2019 Yes    Hypoglycemia [E16.2] 08/06/2019 08/09/2019 Yes    Elevated lactic acid level [R79.89] 08/06/2019 08/09/2019 Yes    Altered mental status [R41.82] 08/06/2019 08/09/2019 Yes    Atrial fibrillation with RVR [I48.91] 07/18/2017 08/09/2019 Yes     VTE Risk Mitigation (From admission, onward)        Ordered     heparin (porcine) injection 5,000 Units  Every 8 hours      08/06/19 2001     Place ROSANNA hose  Until discontinued      08/06/19 2001     IP VTE HIGH RISK PATIENT  Once      08/06/19 2001           Encephalopathy, metabolic   AMS  - waxing and waning  Multifactorial - likely metabolic +/- uremia +/- infectious process  +/- hypoglycemia +/- medications induced. - continue antibiotics will broaden coverage given worsening clinical status, re-culture  - neurology following -   - continue keppra. Vimpat redosed per neurology.   - move to icu for closer monitoring and continuous EEG per Dr. Ramses Florentino, need to r/o non-convulsive status given hx of seizure d/o      Hypoglycemia        - poor po intake due to AMS       - continue D10 at 50 cc/hr, frequent accuchecks        - continue to hold insulin, hold any po antihypoglycemics      Pneumonia due to infectious organism  On admit CXR shows persistence of predominantly ground-glass type pulmonary opacities in the right mid and lower lung zone,   -Continue antibiotics     Altered mental status  Etiology unclear. See above     HTN,                    -continue to  hold po agents for now with labile pressures      Atrial fibrillation with RVR  Patient with PAF on admit telemetry showed a-fib rvr, started on cardizem gtt in ED  -Continuous cardiac monitoring   -cardizem gtt if bp will tolerate     ESRD (end stage renal  disease)  Nephrology consulted for inpatient HD      Amos Snyder MD  Department of Hospital Medicine   [facility]

## 2019-08-13 NOTE — CARE UPDATE
08/12/19 2241   PRE-TX-O2   O2 Device (Oxygen Therapy) room air   SpO2 95 %   Pulse Oximetry Type Continuous   $ Pulse Oximetry - Multiple Charge Pulse Oximetry - Multiple   Pulse 72   Resp 18

## 2019-08-14 LAB
AMYLASE SERPL-CCNC: 59 U/L (ref 20–110)
ANION GAP SERPL CALC-SCNC: 20 MMOL/L (ref 8–16)
BUN SERPL-MCNC: 13 MG/DL (ref 8–23)
CALCIUM SERPL-MCNC: 8.5 MG/DL (ref 8.7–10.5)
CHLORIDE SERPL-SCNC: 93 MMOL/L (ref 95–110)
CO2 SERPL-SCNC: 23 MMOL/L (ref 23–29)
CREAT SERPL-MCNC: 6.2 MG/DL (ref 0.5–1.4)
ERYTHROCYTE [DISTWIDTH] IN BLOOD BY AUTOMATED COUNT: 22.4 % (ref 11.5–14.5)
EST. GFR  (AFRICAN AMERICAN): 7.2 ML/MIN/1.73 M^2
EST. GFR  (NON AFRICAN AMERICAN): 6.2 ML/MIN/1.73 M^2
GLUCOSE SERPL-MCNC: 101 MG/DL (ref 70–110)
GLUCOSE SERPL-MCNC: 101 MG/DL (ref 70–110)
GLUCOSE SERPL-MCNC: 116 MG/DL (ref 70–110)
GLUCOSE SERPL-MCNC: 81 MG/DL (ref 70–110)
GLUCOSE SERPL-MCNC: 92 MG/DL (ref 70–110)
GLUCOSE SERPL-MCNC: 96 MG/DL (ref 70–110)
HBV CORE AB SERPL QL IA: NEGATIVE
HBV SURFACE AB SER QL: NON REACTIVE
HBV SURFACE AG SERPL QL IA: NEGATIVE
HCT VFR BLD AUTO: 28.5 % (ref 37–48.5)
HGB BLD-MCNC: 9.7 G/DL (ref 12–16)
LIPASE SERPL-CCNC: 33 U/L (ref 4–60)
MAGNESIUM SERPL-MCNC: 1.8 MG/DL (ref 1.6–2.6)
MCH RBC QN AUTO: 26.6 PG (ref 27–31)
MCHC RBC AUTO-ENTMCNC: 34 G/DL (ref 32–36)
MCV RBC AUTO: 78 FL (ref 82–98)
PLATELET # BLD AUTO: 122 K/UL (ref 150–350)
PMV BLD AUTO: ABNORMAL FL (ref 9.2–12.9)
POTASSIUM SERPL-SCNC: 3.2 MMOL/L (ref 3.5–5.1)
RBC # BLD AUTO: 3.64 M/UL (ref 4–5.4)
SODIUM SERPL-SCNC: 136 MMOL/L (ref 136–145)
WBC # BLD AUTO: 3.23 K/UL (ref 3.9–12.7)

## 2019-08-14 PROCEDURE — 25000003 PHARM REV CODE 250: Performed by: NURSE PRACTITIONER

## 2019-08-14 PROCEDURE — 20000000 HC ICU ROOM

## 2019-08-14 PROCEDURE — 25000003 PHARM REV CODE 250: Performed by: INTERNAL MEDICINE

## 2019-08-14 PROCEDURE — 94761 N-INVAS EAR/PLS OXIMETRY MLT: CPT

## 2019-08-14 PROCEDURE — 80048 BASIC METABOLIC PNL TOTAL CA: CPT

## 2019-08-14 PROCEDURE — 25000003 PHARM REV CODE 250

## 2019-08-14 PROCEDURE — 82962 GLUCOSE BLOOD TEST: CPT

## 2019-08-14 PROCEDURE — 82150 ASSAY OF AMYLASE: CPT

## 2019-08-14 PROCEDURE — 83735 ASSAY OF MAGNESIUM: CPT

## 2019-08-14 PROCEDURE — 90935 HEMODIALYSIS ONE EVALUATION: CPT

## 2019-08-14 PROCEDURE — 63600175 PHARM REV CODE 636 W HCPCS: Performed by: PSYCHIATRY & NEUROLOGY

## 2019-08-14 PROCEDURE — 83690 ASSAY OF LIPASE: CPT

## 2019-08-14 PROCEDURE — 63600175 PHARM REV CODE 636 W HCPCS: Performed by: NURSE PRACTITIONER

## 2019-08-14 PROCEDURE — 63600175 PHARM REV CODE 636 W HCPCS: Performed by: INTERNAL MEDICINE

## 2019-08-14 PROCEDURE — 25800024 PHARM REV CODE 258: Performed by: INTERNAL MEDICINE

## 2019-08-14 PROCEDURE — 85027 COMPLETE CBC AUTOMATED: CPT

## 2019-08-14 PROCEDURE — C9254 INJECTION, LACOSAMIDE: HCPCS | Performed by: NURSE PRACTITIONER

## 2019-08-14 RX ORDER — POTASSIUM CHLORIDE 14.9 MG/ML
20 INJECTION INTRAVENOUS ONCE
Status: COMPLETED | OUTPATIENT
Start: 2019-08-14 | End: 2019-08-14

## 2019-08-14 RX ADMIN — HEPARIN SODIUM 5000 UNITS: 5000 INJECTION INTRAVENOUS; SUBCUTANEOUS at 10:08

## 2019-08-14 RX ADMIN — SODIUM THIOSULFATE 12.5 G: 250 INJECTION, SOLUTION INTRAVENOUS at 01:08

## 2019-08-14 RX ADMIN — MUPIROCIN: 20 OINTMENT TOPICAL at 08:08

## 2019-08-14 RX ADMIN — HEPARIN SODIUM 5000 UNITS: 5000 INJECTION INTRAVENOUS; SUBCUTANEOUS at 06:08

## 2019-08-14 RX ADMIN — PIPERACILLIN AND TAZOBACTAM 4.5 G: 4; .5 INJECTION, POWDER, LYOPHILIZED, FOR SOLUTION INTRAVENOUS; PARENTERAL at 08:08

## 2019-08-14 RX ADMIN — DEXTROSE MONOHYDRATE 500 MG: 50 INJECTION, SOLUTION INTRAVENOUS at 12:08

## 2019-08-14 RX ADMIN — DEXTROSE: 20 INJECTION, SOLUTION INTRAVENOUS at 03:08

## 2019-08-14 RX ADMIN — CHLORHEXIDINE GLUCONATE 15 ML: 1.2 RINSE ORAL at 10:08

## 2019-08-14 RX ADMIN — ASPIRIN 81 MG: 81 TABLET, COATED ORAL at 10:08

## 2019-08-14 RX ADMIN — EPOETIN ALFA-EPBX 2800 UNITS: 10000 INJECTION, SOLUTION INTRAVENOUS; SUBCUTANEOUS at 12:08

## 2019-08-14 RX ADMIN — LEVETIRACETAM 250 MG: 100 INJECTION, SOLUTION, CONCENTRATE INTRAVENOUS at 10:08

## 2019-08-14 RX ADMIN — CHLORHEXIDINE GLUCONATE 15 ML: 1.2 RINSE ORAL at 08:08

## 2019-08-14 RX ADMIN — SODIUM CHLORIDE 50 MG: 9 INJECTION, SOLUTION INTRAVENOUS at 12:08

## 2019-08-14 RX ADMIN — POTASSIUM CHLORIDE 20 MEQ: 14.9 INJECTION, SOLUTION INTRAVENOUS at 10:08

## 2019-08-14 RX ADMIN — HEPARIN SODIUM 5000 UNITS: 5000 INJECTION INTRAVENOUS; SUBCUTANEOUS at 12:08

## 2019-08-14 RX ADMIN — MUPIROCIN: 20 OINTMENT TOPICAL at 10:08

## 2019-08-14 RX ADMIN — PIPERACILLIN AND TAZOBACTAM 4.5 G: 4; .5 INJECTION, POWDER, LYOPHILIZED, FOR SOLUTION INTRAVENOUS; PARENTERAL at 10:08

## 2019-08-14 RX ADMIN — DEXTROSE MONOHYDRATE 250 MG: 50 INJECTION, SOLUTION INTRAVENOUS at 02:08

## 2019-08-14 RX ADMIN — SODIUM CHLORIDE 50 MG: 9 INJECTION, SOLUTION INTRAVENOUS at 02:08

## 2019-08-14 NOTE — PT/OT/SLP PROGRESS
Physical Therapy      Patient Name:  Griselda Neely   MRN:  [id number]    Patient not seen today secondary to Other (Comment)(pt transferred to ICU, new PT orders needed when medically stable. ). Will follow-up 08/15/19  .    Jackie Toro, PT

## 2019-08-14 NOTE — PT/OT/SLP PROGRESS
Occupational Therapy      Patient Name:  Griselda Neely   MRN:  [id number]    Patient not seen today secondary to (pt transferred to ICU; resume orders needed).    Jany Rivero OT  8/14/2019

## 2019-08-14 NOTE — PROGRESS NOTES
[facility]  Nephrology  Progress Note    Patient Name: Griselda Neely  MRN: [id number]  Admission Date: 8/6/2019  Hospital Length of Stay: 8 days  Attending Provider: Amos Snyder MD   Primary Care Physician: Kalie Neely MD  Principal Problem:Encephalopathy, toxic      Subjective:     Interval History: Alert but remains confused & minimally verbal; no obvious distress appreciated    Review of patient's allergies indicates:  No Known Allergies  Current Facility-Administered Medications   Medication Frequency    0.9%  NaCl infusion PRN    aspirin EC tablet 81 mg Daily    chlorhexidine 0.12 % solution 15 mL BID    dextrose 20 % infusion Continuous    dextrose 50% injection 12.5 g PRN    diltiaZEM 125 mg in D5W 125 mL infusion Continuous    epoetin alfonzo-epbx injection 2,800 Units Every Mon, Wed, Fri    glucagon (human recombinant) injection 1 mg PRN    heparin (porcine) injection 5,000 Units Q8H    insulin aspart U-100 pen 0-5 Units Q6H PRN    lacosamide (VIMPAT) 50 mg in sodium chloride 0.9% 100 mL IVPB Q12H    levETIRAcetam (KEPPRA) 250 mg in dextrose 5 % 100 mL IVPB Daily    levETIRAcetam (KEPPRA) 500 mg in dextrose 5 % 100 mL IVPB Daily PRN    metoprolol injection 5 mg Q6H PRN    mupirocin 2 % ointment BID    norepinephrine 4 mg in dextrose 5 % 250 mL infusion Continuous    ondansetron injection 4 mg Q4H PRN    piperacillin-tazobactam (ZOSYN) 0.75 g in dextrose 5 % 100 mL PRN    piperacillin-tazobactam 4.5 g in dextrose 5 % 100 mL IVPB (ready to mix system) Q12H    sodium chloride 0.9% flush 10 mL PRN    sodium thiosulfate 12.5 gram/50 mL (250 mg/mL) injection 12.5 g Every Mon, Wed, Fri    valproate (DEPACON) 250 mg in dextrose 5 % 50 mL IVPB Q24H    valproate (DEPACON) 500 mg in dextrose 5 % 50 mL IVPB Q24H       Objective:     Vital Signs (Most Recent):  Temp: 98 °F (36.7 °C) (08/14/19 0715)  Pulse: 101 (08/14/19 0818)  Resp: 18 (08/14/19 0818)  BP: (!) 161/90  (08/14/19 0810)  SpO2: 99 % (08/14/19 0818)  O2 Device (Oxygen Therapy): room air (08/14/19 0818) Vital Signs (24h Range):  Temp:  [97 °F (36.1 °C)-98 °F (36.7 °C)] 98 °F (36.7 °C)  Pulse:  [] 101  Resp:  [15-32] 18  SpO2:  [95 %-99 %] 99 %  BP: (106-161)/(53-97) 161/90     Weight: 56.5 kg (124 lb 9 oz) (08/14/19 0400)  Body mass index is 20.73 kg/m².  Body surface area is 1.61 meters squared.    I/O last 3 completed shifts:  In: 2241.7 [I.V.:1091.7; Other:650; IV Piggyback:500]  Out: 586 [Other:586]    Physical Exam   Constitutional: No distress.   Chronically ill & somewhat malnourished-appearing   HENT:   Head: Normocephalic and atraumatic.   Mouth/Throat: Oropharynx is clear and moist.   Eyes: Conjunctivae are normal. Right eye exhibits no discharge. Left eye exhibits no discharge. No scleral icterus.   Neck: Normal range of motion. Neck supple. No JVD present.   Cardiovascular: Normal rate. Exam reveals no gallop and no friction rub.   Murmur heard.  Irregularly irregular rhythm   Pulmonary/Chest: Effort normal and breath sounds normal. No respiratory distress. She has no rales.   Abdominal: Soft. She exhibits no distension and no mass. There is no tenderness.   Hypoactive BS in all quadrants   Genitourinary:   Genitourinary Comments: Deferred   Musculoskeletal: Normal range of motion. She exhibits no edema, tenderness or deformity.   R UE AV Fistula w/ palpable thrill   Neurological: She is alert.   Skin: Skin is warm and dry. She is not diaphoretic.   B LE Ulcers (Calciphylaxis)   Psychiatric:   Unable to assess   Nursing note and vitals reviewed.      Significant Labs:sure  CBC:   Recent Labs   Lab 08/14/19  0300   WBC 3.23*   RBC 3.64*   HGB 9.7*   HCT 28.5*   *   MCV 78*   MCH 26.6*   MCHC 34.0     CMP:   Recent Labs   Lab 08/13/19  0303 08/14/19  0300    101   CALCIUM 7.8* 8.5*   ALBUMIN 2.6*  --    PROT 6.0  --    * 136   K 2.8* 3.2*   CO2 22* 23   CL 97 93*   BUN 11 13    CREATININE 5.1* 6.2*   ALKPHOS 49*  --    ALT 6*  --    AST 14  --    BILITOT 0.5  --        Significant Imaging:  X-Ray: Reviewed  CT: Reviewed    Assessment/Plan:     Active Diagnoses:    Diagnosis Date Noted POA    Altered mental status [R41.82] 08/12/2019 Yes    Encephalomalacia on imaging study [G93.89] 08/06/2019 Yes    Pneumonia due to infectious organism [J18.9] 08/06/2019 Yes    ESRD (end stage renal disease) [N18.6] 05/11/2016 Yes      Problems Resolved During this Admission:    Diagnosis Date Noted Date Resolved POA    PRINCIPAL PROBLEM:  Encephalopathy, metabolic  [G92] 08/06/2019 08/09/2019 Yes    Disorientation [R41.0] 08/06/2019 08/09/2019 Yes    Hypoglycemia [E16.2] 08/06/2019 08/09/2019 Yes    Elevated lactic acid level [R79.89] 08/06/2019 08/09/2019 Yes    Altered mental status [R41.82] 08/06/2019 08/09/2019 Yes    Atrial fibrillation with RVR [I48.91] 07/18/2017 08/09/2019 Yes     1 ESRD (HD-MWF via R UE AV Fistula)- clinically stable at present; no overt volume overload, uremic signs/symptoms, electrolyte perturbations nor acid-base disturbance; no active issues    -maintenance HD (duration: 3h; UF Goal: 2-3L as tolerated;  Standard 'bath'; Access: AVF; Qb: 400 ml/min, Qd: 2x BFR; 12.5 gm of Na thiosulfate over last 30-60 min of treatment))    -DAILY renal function panel to document sCr trend, optimize electrolyte 'bath' & assess response to therapy    -avoid nephrotoxic agents (NSAIDs, IV contrast dye, MRI w/ Gadolinium constrast)    -renally dose all appropriate medications, including antibiotics    2. HYPOKALEMIA (3.2)- clinically stable at present (s/p IV repletion); no active issues    -agree w/ above therapy; check serum Magnesium level to exclude hypomagnesemia-induced hypokalemia; 3K Bath during HD     3. HTN- clinically stable at present; BP NOT AT GOAL (161/90); no active issues    -continue current anti-HTN drug regimen (GIVE BP MEDS POST HD on HD Days) +/- HD-associated  UF     4. ANEMIA- clinically stable at present; H/H AT GOAL (9.7/28.5); no active issues    -trend DAILY CBC (H/H) to effect optimal blood-loss surveillance     5. SECONDARY HYPERPARATHYROIDISM (sHPT)-- clinically stable on current therapy (Na thiosulfate 12.5 gm x last 30-60 min of HD); no active issues    -continue dietary binder/Vitamin D +/- HD-associated calcimimetic therapy (Cinacalcet vs Etelcalcetide)    6. ALTERED MENTAL STATUS- remains acutely confused & markedly restless this am; etiology likely multifactorial; Neurology service on the case    -management per Neurology/Primary team recommendations    Thank you for your consult. I will follow-up with patient. Please contact us if you have any additional questions.    Dane Martinez III, MD  [facility]  [facility]  [phone] (C)

## 2019-08-14 NOTE — PROGRESS NOTES
[facility]  Neurology  Progress Note    Patient Name: Griselda Neely  MRN: [id number]  Admission Date: 8/6/2019  Hospital Length of Stay: 8 days  Code Status: Full Code   Attending Provider: Amos Snyder MD  Primary Care Physician: Kalie Neely MD   Principal Problem:Encephalopathy, toxic    Subjective:     Interval History: Patient seen and examined, she seems more alert and is eating breakfast.    Current Neurological Medications: MAR reviewed.     HPI: Griselda Neely is a 73 y.o. female with a history as below who presented to the ED from her cardiologist office via  for evaluation of altered mental status changes.  Sister at bedside reports patient has been in [facility] s/p discharge from hospital ~ 3 weeks ago due to encephalopathy. Pt was started on Keppra and Vimpat during that admission. Sister at bedside further states patient stopped eating and drinking on Friday. Further reports patient with ESRD, had dialysis on Wednesday and Friday but was taken off early. Also reports went to HD on Monday.  Per chart review, patient hospitalized In July 2019 and developed acute encephalopathy during that admission, neurology was consulted with extensive work-up and non-acute findings. On admit, labs and imaging reviewed.  Neurology consulted with recs for MRI/MRA and EEG.     CRT: 6.7  K: 3.4  LDL: 55.2  Ammonia: 18  B12: 1045  TSH: 2.7  AED levels: pending      Brain imaging:  CT head 8/12/19: 1. No acute intracranial abnormality.  2. Chronic small vessel ischemic changes.     CT head:   No acute intracranial abnormality.        EEG: This is an abnormal awake and drowsy routine EEG due to diffuse slowing of the background activity consistent with a mild to moderate encephalopathy.           Current Facility-Administered Medications   Medication Dose Route Frequency Provider Last Rate Last Dose    0.9%  NaCl infusion   Intravenous PRN Dane Martinez MD        aspirin EC  tablet 81 mg  81 mg Oral Daily TODD Rush   81 mg at 08/14/19 1010    chlorhexidine 0.12 % solution 15 mL  15 mL Mouth/Throat BID Lizett Mann PharmD   15 mL at 08/14/19 1011    dextrose 20 % infusion   Intravenous Continuous Daren Lea MD 50 mL/hr at 08/13/19 1834 50 mL at 08/13/19 1834    dextrose 50% injection 12.5 g  12.5 g Intravenous PRN TODD Rush   12.5 g at 08/13/19 1743    diltiaZEM 125 mg in D5W 125 mL infusion  5 mg/hr Intravenous Continuous Katharina Powell MD   Stopped at 08/13/19 1101    epoetin alfonzo-epbx injection 2,800 Units  50 Units/kg Intravenous Every Mon, Wed, Fri Chalino Victoria MD   2,800 Units at 08/12/19 2022    glucagon (human recombinant) injection 1 mg  1 mg Intramuscular PRN TODD Rush        heparin (porcine) injection 5,000 Units  5,000 Units Subcutaneous Q8H TODD Rush   5,000 Units at 08/14/19 1011    insulin aspart U-100 pen 0-5 Units  0-5 Units Subcutaneous Q6H PRN TODD Rush        lacosamide (VIMPAT) 50 mg in sodium chloride 0.9% 100 mL IVPB  50 mg Intravenous Q12H Maggie Blood  mL/hr at 08/14/19 0036 50 mg at 08/14/19 0036    levETIRAcetam (KEPPRA) 250 mg in dextrose 5 % 100 mL IVPB  250 mg Intravenous Daily Jef Florentino  mL/hr at 08/14/19 1020 250 mg at 08/14/19 1020    levETIRAcetam (KEPPRA) 500 mg in dextrose 5 % 100 mL IVPB  500 mg Intravenous Daily PRN Maggie Blood NP        metoprolol injection 5 mg  5 mg Intravenous Q6H PRN Daren Lea MD   5 mg at 08/12/19 1658    mupirocin 2 % ointment   Nasal BID Lizett Mann PharmD        norepinephrine 4 mg in dextrose 5 % 250 mL infusion  0.02 mcg/kg/min Intravenous Continuous Daren Lea MD   Stopped at 08/12/19 2000    ondansetron injection 4 mg  4 mg Intravenous Q4H PRN Hilary La MD   4 mg at 08/13/19 2007    piperacillin-tazobactam (ZOSYN) 0.75 g in dextrose 5 % 100 mL  0.75 g Intravenous  PRN Daren Lea MD        piperacillin-tazobactam 4.5 g in dextrose 5 % 100 mL IVPB (ready to mix system)  4.5 g Intravenous Q12H Daren Lea MD 25 mL/hr at 08/14/19 1012 4.5 g at 08/14/19 1012    sodium chloride 0.9% flush 10 mL  10 mL Intravenous PRN TODD Rush        sodium thiosulfate 12.5 gram/50 mL (250 mg/mL) injection 12.5 g  12.5 g Intravenous Every Mon, Wed, Fri Thomas John Martinez MD   12.5 g at 08/12/19 2023    valproate (DEPACON) 250 mg in dextrose 5 % 50 mL IVPB  250 mg Intravenous Q24H Maggie Blood, NP 50 mL/hr at 08/13/19 1159 250 mg at 08/13/19 1159    valproate (DEPACON) 500 mg in dextrose 5 % 50 mL IVPB  500 mg Intravenous Q24H Maggie Blood, NP 50 mL/hr at 08/14/19 0036 500 mg at 08/14/19 0036       Review of Systems   Unable to perform ROS: Patient nonverbal     Objective:     Vital Signs (Most Recent):  Temp: (P) 98.2 °F (36.8 °C) (08/14/19 1055)  Pulse: 67 (08/14/19 1245)  Resp: 14 (08/14/19 1245)  BP: 136/82 (08/14/19 1245)  SpO2: 98 % (08/14/19 1245) Vital Signs (24h Range):  Temp:  [98 °F (36.7 °C)-98.2 °F (36.8 °C)] (P) 98.2 °F (36.8 °C)  Pulse:  [] 67  Resp:  [14-25] 14  SpO2:  [95 %-100 %] 98 %  BP: (103-162)/() 136/82     Weight: 56.5 kg (124 lb 9 oz)  Body mass index is 20.73 kg/m².    Physical Exam   Constitutional: She appears well-developed and well-nourished.   HENT:   Head: Normocephalic and atraumatic.   Eyes: Pupils are equal, round, and reactive to light. EOM are normal.   Neck: Normal range of motion. Neck supple.   Cardiovascular: Normal rate, regular rhythm and normal heart sounds.   Pulmonary/Chest: Effort normal and breath sounds normal.   Abdominal: Soft.   Musculoskeletal: Normal range of motion.   Neurological: She is alert.   Skin: Skin is warm and dry.   Vitals reviewed.      NEUROLOGICAL EXAMINATION:     MENTAL STATUS   Level of consciousness: alert       Patient nonverbal on exam but has been talking per family and other  MD  Follows commands except for talking  QUEEN spontaneously     CRANIAL NERVES     CN III, IV, VI   Pupils are equal, round, and reactive to light.  Extraocular motions are normal.       Significant Labs: All pertinent lab results from the past 24 hours have been reviewed.    Significant Imaging: I have reviewed all pertinent imaging results/findings within the past 24 hours.    Assessment and Plan:   Encephalopathy  -Likely metabolic, multifactorial including dehydration, infection, and hypokalemia  -EEG: improving  -B12, TSH, Folate, Ammonia WNL  -Previous MRI brain done July 2019 negative acute, shows remote ischemia      History of seizures  -Abnormal EEGs on previous admit and pt started on Vimpat and Keppra  -EEG: P, and repeat ordered for tomorrow 8/14/19  -Increase keppra to 750mg daily, given an additional 500mg after dialysis  -Continue Vimpat 50mg daily  -Start depakote 250mg in the am and 500mg in the pm  - monitor in ICU one more day     Case discussed with Dr. Carrasco        ESRD  -Nephrology following      Pneumonia   -Management per IM     Seizure education.    No driving for now, no swimming alone, no climbing high areas, no operation of heavy machinery or working with high risk electricity equipment.  Continue to take AEDs as prescribed. If any major side effects from neurological medications go to the ED including mood changes and severe depression.  Patient and/or next of kin informed.  Follow up Neurology in 2 weeks at [phone].  Medication side effects discussed with the patient and/or caregiver.  Active Diagnoses:    Diagnosis Date Noted POA    Altered mental status [R41.82] 08/12/2019 Yes    Encephalomalacia on imaging study [G93.89] 08/06/2019 Yes    Pneumonia due to infectious organism [J18.9] 08/06/2019 Yes    ESRD (end stage renal disease) [N18.6] 05/11/2016 Yes      Problems Resolved During this Admission:    Diagnosis Date Noted Date Resolved POA    PRINCIPAL PROBLEM:   Encephalopathy, metabolic  [G92] 08/06/2019 08/09/2019 Yes    Disorientation [R41.0] 08/06/2019 08/09/2019 Yes    Hypoglycemia [E16.2] 08/06/2019 08/09/2019 Yes    Elevated lactic acid level [R79.89] 08/06/2019 08/09/2019 Yes    Altered mental status [R41.82] 08/06/2019 08/09/2019 Yes    Atrial fibrillation with RVR [I48.91] 07/18/2017 08/09/2019 Yes       VTE Risk Mitigation (From admission, onward)        Ordered     heparin (porcine) injection 5,000 Units  Every 8 hours      08/06/19 2001     Place ROSANNA hose  Until discontinued      08/06/19 2001     IP VTE HIGH RISK PATIENT  Once      08/06/19 2001          Maggie Blood NP  Neurology  [facility]    I, Dr. Ramses Florentino, have personally seen and examined the patient with my advanced provider and agree with above. I personally did a focused exam, and reviewed all necessary clinical information. I discussed my management plan with my NP and agree with above. Much improved following commands. Discussed management with IM.

## 2019-08-14 NOTE — PROGRESS NOTES
[facility]  Cardiology  Progress Note    Patient Name: Griselda Neely  MRN: [id number]  Admission Date: 8/6/2019  Hospital Length of Stay: 8 days  Code Status: Full Code   Attending Physician: Amos Snyder MD   Primary Care Physician: Kalie Neely MD  Expected Discharge Date:   Principal Problem:Encephalopathy, toxic    Subjective:       Interval History: Patient is calm and resting at present.   She is oriented to person and place. Does not answer when I ask the year. Per nursing staff she did become combative overnight. Continues to state she does not want to eat. Per her sister, she has not eaten well since Friday. Remains on D20 drip and low dose Cardizem drip with stable HR.     ROS:  Not obtainable.  Patient confused.     Cardiology:  Atrial fibrillation, rate controlled on Cardizem at 2.5mg/hr  Objective:     Vital Signs (Most Recent):  Temp: 98 °F (36.7 °C) (08/14/19 0715)  Pulse: 101 (08/14/19 0818)  Resp: 18 (08/14/19 0818)  BP: (!) 161/90 (08/14/19 0810)  SpO2: 99 % (08/14/19 0818) Vital Signs (24h Range):  Temp:  [97 °F (36.1 °C)-98 °F (36.7 °C)] 98 °F (36.7 °C)  Pulse:  [] 101  Resp:  [15-32] 18  SpO2:  [95 %-100 %] 99 %  BP: (106-171)/(53-97) 161/90     Weight: 56.5 kg (124 lb 9 oz)  Body mass index is 20.73 kg/m².    SpO2: 99 %  O2 Device (Oxygen Therapy): room air      Intake/Output Summary (Last 24 hours) at 8/14/2019 0905  Last data filed at 8/14/2019 0600  Gross per 24 hour   Intake 1591.67 ml   Output --   Net 1591.67 ml       Lines/Drains/Airways     Central Venous Catheter Line                 Percutaneous Central Line Insertion/Assessment - triple lumen  08/12/19 1921 1 day          Drain                 Hemodialysis AV Fistula 08/08/19 0214 Right upper arm 6 days          Peripheral Intravenous Line                 Peripheral IV - Single Lumen 08/11/19 2330 22 G Anterior;Left Forearm 2 days         Midline Catheter Insertion/Assessment  - Single Lumen 08/12/19  1700 Left median cubital vein (antecubital fossa) 1 day                Scheduled Meds:   aspirin  81 mg Oral Daily    chlorhexidine  15 mL Mouth/Throat BID    epoetin alfonzo-ebpx (RETACRIT) injection  50 Units/kg Intravenous Every Mon, Wed, Fri    heparin (porcine)  5,000 Units Subcutaneous Q8H    lacosamide (VIMPAT) IVPB  50 mg Intravenous Q12H    levetiracetam IVPB  250 mg Intravenous Daily    mupirocin   Nasal BID    piperacillin-tazobactam (ZOSYN) IVPB  4.5 g Intravenous Q12H    sodium thiosulfate  12.5 g Intravenous Every Mon, Wed, Fri    valproate sodium (DEPACON) IVPB  250 mg Intravenous Q24H    valproate sodium (DEPACON) IVPB  500 mg Intravenous Q24H     Continuous Infusions:   dextrose 20 % in water (D20W) 50 mL (08/13/19 1834)    dilTIAZem Stopped (08/13/19 1101)    norepinephrine bitartrate-D5W Stopped (08/12/19 2000)     PRN Meds:.sodium chloride 0.9%, dextrose 50%, glucagon (human recombinant), insulin aspart U-100, levetiracetam IVPB, metoprolol, ondansetron, piperacillin-tazobactam (ZOSYN) IVPB, sodium chloride 0.9%     Physical Exam     HEENT: Normocephalic, atraumatic, PERRL, Conjunctiva pink, no scleral icterus.   CVS: S1S2+, Irregular, + SM, rubs or gallops, JVP: Normal.  LUNGS: Clear, diminished in the bases.   ABDOMEN: Soft, NT, BS+  EXTREMITIES: No cyanosis, clubbing or edema. Left upper arm fistula + thrill + bruit. Left thigh calciphylaxis ulcer with dressing CDI.   NEURO: Confused.         Significant Labs:   Blood Culture:   Recent Labs   Lab 08/12/19  1730 08/12/19  1754   LABBLOO No Growth to date  No Growth to date No Growth to date  No Growth to date   , BMP:   Recent Labs   Lab 08/12/19  1920 08/13/19  0303 08/14/19  0300   * 107 101    135* 136   K 3.3* 2.8* 3.2*   CL 99 97 93*   CO2 24 22* 23   BUN 12 11 13   CREATININE 4.6* 5.1* 6.2*   CALCIUM 8.3* 7.8* 8.5*   MG 1.7 1.6  --    , CMP   Recent Labs   Lab 08/12/19  1920 08/13/19  0303 08/14/19  0300     135* 136   K 3.3* 2.8* 3.2*   CL 99 97 93*   CO2 24 22* 23   * 107 101   BUN 12 11 13   CREATININE 4.6* 5.1* 6.2*   CALCIUM 8.3* 7.8* 8.5*   PROT 6.5 6.0  --    ALBUMIN 2.8* 2.6*  --    BILITOT 0.7 0.5  --    ALKPHOS 55 49*  --    AST 15 14  --    ALT 8* 6*  --    ANIONGAP 15 16 20*   ESTGFRAFRICA 10.3* 9.1* 7.2*   EGFRNONAA 8.9* 7.9* 6.2*   , CBC   Recent Labs   Lab 08/13/19  0303 08/13/19  1930 08/14/19  0300   WBC 3.07* 3.38* 3.23*   HGB 9.1* 9.9* 9.7*   HCT 27.9* 29.2* 28.5*   * 119* 122*   , INR   No results for input(s): INR, PROTIME in the last 48 hours., Lipid Panel   No results for input(s): CHOL, HDL, LDLCALC, TRIG, CHOLHDL in the last 48 hours.,   Pathology Results  (Last 10 years)    None          Significant Imaging:  I have reviewed the imaging all significant imaging for the last 24 hours.      Assessment and Plan:     IMPRESSION:  1. Altered mental status. Secondary to seizure activity vs hypoglyemia. Head CT negative. EEG consistent with encephalopathy, negative for seizure activity. Waxing and waning symptoms. Etiology? Neurology on board. Awaiting result of continuous EEG.   2. Congestive heart failure. Acute on chronic. LVEF ~40-45% on last echo 7/2019. Euvolemic on exam.   3. Hypoglycemia. Etiology likely poor oral intake. Remains on D20 drip.   4. Calciphlyaxis. Left leg ulcer. Undergoing wound care treatments.   5. Atrial fibrillation. Valvular. Persistent. Patient does not wish to be on any oral anticoagulation or to be cardioverted. Currently with RVR.   6. Hypertension. Better controlled at present. Elevates with agitation.   7. s/p MVR via right thoracotomy approach on 3/18/19 with Dr. Lora. Functioning appropriately per last echo.   8. Mildly elevated troponins in the presence of ESRD. Chronic. Not consistent with ACS.   9. CAD. Nonobstructive on LHC done on 3/13/2019.   10. End-stage renal disease on hemodialysis MWF. Anuric. Followed by Dr. Ingram.   11. Hypocalcemia.  Nephrology on board and managing.   12. Hypokalemia, hypomagnesemia. Nephrology on board and managing. .   13. Hyperlipidemia  14. Chronic tobacco abuse.  15. COPD on home O2 at 2L  16. Reported history of acute pancreatitis.      PLAN:    1. Check amylase and lipase, check magnesium.   2. Replace potassium with 20 meq IV.  3. Place consult to endocrinology. Patient with persistent hypoglycemia.           Ramila Bloom NP  Cardiology  [facility]      I have personally seen and examined the patient. I reviewed the notes, assessments, and/or procedures performed by Ms Ramila Bloom, I concur with her documentation of Griselda Neely.  Discussed with Dr Ramses Florentino, Dr Snyder

## 2019-08-14 NOTE — PLAN OF CARE
08/14/19 0818   PRE-TX-O2   O2 Device (Oxygen Therapy) room air   SpO2 99 %   Pulse Oximetry Type Continuous   $ Pulse Oximetry - Multiple Charge Pulse Oximetry - Multiple   Pulse 101   Resp 18

## 2019-08-14 NOTE — PROGRESS NOTES
"  [facility]  Adult Nutrition  Progress Note    SUMMARY       Recommendations    Recommendation/Intervention: 1. If PO intake remains at 0% recommend starting Enteral Nutrition 2. Continue to encourge PO intake and Ensure intake   Goals: 1. Pt will meet 100% of estimated protein and energy needs 2. Pt will consume >50% of EEN   Nutrition Goal Status: new  Communication of RD Recs: reviewed with RN    Reason for Assessment    Reason For Assessment: RD follow-up  Diagnosis: renal disease  Relevant Medical History: ESRD on HD, encephalopathy   Interdisciplinary Rounds: attended    Nutrition Risk Screen    Nutrition Risk Screen: no indicators present    Nutrition/Diet History    Patient Reported Diet/Restrictions/Preferences: heart healthy  Typical Food/Fluid Intake: regular diet, very poor PO intake  Food Preferences: frosted flakes and whole milk  Spiritual, Cultural Beliefs, Mu-ism Practices, Values that Affect Care: no  Supplemental Drinks or Food Habits: Ensure Enlive  Food Allergies: NKFA  Factors Affecting Nutritional Intake: decreased appetite    Anthropometrics    Temp: 98 °F (36.7 °C)  Height Method: Stated  Height: 5' 5" (165.1 cm)  Height (inches): 65 in  Weight Method: Bed Scale  Weight: 56.5 kg (124 lb 9 oz)  Weight (lb): 124.56 lb  Ideal Body Weight (IBW), Female: 125 lb  % Ideal Body Weight, Female (lb): 89.59 lb  % Ideal Body Weight Malnutrition: 80-90% - mild deficit  BMI (Calculated): 18.7  BMI Grade: 18.5-24.9 - normal  Weight Loss: unintentional  Usual Body Weight (UBW), k kg  Weight Change Amount: 20 lb(Severe weight loss; >5% in <1 month)  % Usual Body Weight: 84.84  % Weight Change From Usual Weight: -15.33 %       Lab/Procedures/Meds    Pertinent Labs Reviewed: reviewed   2019 03:00   Sodium 136   Potassium 3.2 (L)   Chloride 93 (L)   CO2 23   Anion Gap 20 (H)   BUN, Bld 13   Creatinine 6.2 (H)   eGFR if non  6.2 (A)   eGFR if  7.2 (A) "   Glucose 101   Calcium 8.5 (L)      8/14/2019 03:00   Hemoglobin 9.7 (L)   Hematocrit 28.5 (L)   MCV 78 (L)   MCH 26.6 (L)     Pertinent Medications Reviewed: reviewed  Scheduled Meds:   aspirin  81 mg Oral Daily    chlorhexidine  15 mL Mouth/Throat BID    epoetin alfonzo-ebpx (RETACRIT) injection  50 Units/kg Intravenous Every Mon, Wed, Fri    heparin (porcine)  5,000 Units Subcutaneous Q8H    lacosamide (VIMPAT) IVPB  50 mg Intravenous Q12H    levetiracetam IVPB  250 mg Intravenous Daily    mupirocin   Nasal BID    piperacillin-tazobactam (ZOSYN) IVPB  4.5 g Intravenous Q12H    sodium thiosulfate  12.5 g Intravenous Every Mon, Wed, Fri    valproate sodium (DEPACON) IVPB  250 mg Intravenous Q24H    valproate sodium (DEPACON) IVPB  500 mg Intravenous Q24H     Continuous Infusions:   dextrose 20 % in water (D20W) 50 mL (08/13/19 1834)    dilTIAZem Stopped (08/13/19 1101)    norepinephrine bitartrate-D5W Stopped (08/12/19 2000)     Estimated/Assessed Needs    Weight Used For Calorie Calculations: 50.8 kg (111 lb 15.9 oz)(dry weight )  Energy Calorie Requirements (kcal): 1778 - 2032 (35 - 40)  Energy Need Method: Kcal/kg  Protein Requirements: 61 - 76 (1.2 - 1.5 g/kg dry weight)   Weight Used For Protein Calculations: 50.8 kg (111 lb 15.9 oz)  Fluid Requirements (mL): 1000 ml + UOP /day or per MD      RDA Method (mL): 1778     Nutrition Prescription Ordered    Current Diet Order: Cardiac Diet   Oral Nutrition Supplement: Ensure Enlive Vanilla Milkshake    Evaluation of Received Nutrient/Fluid Intake    Energy Calories Required: not meeting needs  Protein Required: not meeting needs  Fluid Required: not meeting needs  Comments: Patient has not  Tolerance: not tolerating  % Intake of Estimated Energy Needs: 0 - 25 %  % Meal Intake: 0 - 25 %    Nutrition Risk    Level of Risk/Frequency of Follow-up: high     Assessment and Plan  Pt intake is still at 0% +7 days. Pt refuses all food and nutritional supplements.  Pt is encouraged to eat by both RN and RD, however still no PO intake. Consider enteral nutrition to be initiated per MD. If TF recommendation are needed, consult RD.      Monitor and Evaluation    Food and Nutrient Intake: energy intake, food and beverage intake  Food and Nutrient Adminstration: diet order  Physical Activity and Function: nutrition-related ADLs and IADLs  Anthropometric Measurements: weight, weight change, body mass index  Biochemical Data, Medical Tests and Procedures: electrolyte and renal panel, gastrointestinal profile, glucose/endocrine profile, inflammatory profile, lipid profile  Nutrition-Focused Physical Findings: overall appearance     Malnutrition Assessment  Malnutrition Type: chronic illness, acute illness or injury  Energy Intake: other (see comments)(<50% of estimated energy intake for >5days)          Weight Loss (Malnutrition): greater than 5% in 1 month  Energy Intake (Malnutrition): less than or equal to 50% for greater than or equal to 5 days  Subcutaneous Fat (Malnutrition): severe depletion  Muscle Mass (Malnutrition): severe depletion   Orbital Region (Subcutaneous Fat Loss): severe depletion   Yarsani Region (Muscle Loss): moderate depletion  Clavicle Bone Region (Muscle Loss): severe depletion  Clavicle and Acromion Bone Region (Muscle Loss): severe depletion       Subcutaneous Fat Loss (Final Summary): severe protein-calorie malnutrition  Muscle Loss Evaluation (Final Summary): severe protein-calorie malnutrition    Severe Weight Loss (Malnutrition): greater than 5% in 1 month    Nutrition Follow-Up    RD Follow-up?: Yes   Chelo Asif 08/14/2019 11:55 AM

## 2019-08-14 NOTE — PLAN OF CARE
Problem: Oral Intake Inadequate  Goal: Improved Oral Intake  Outcome: Ongoing (interventions implemented as appropriate)  Pt intake remains 0%. Enteral Nutrition may be required.

## 2019-08-14 NOTE — PROGRESS NOTES
[facility] Medicine  Progress Note    Patient Name: Griselda Neely  MRN: [id number]  Patient Class: IP- Inpatient   Admission Date: 8/6/2019  Length of Stay: 8 days  Attending Physician: Amos Snyder MD  Primary Care Provider: Kalie Neely MD        Subjective:     Principal Problem:Encephalopathy, toxic    Interval History: Seen and examined, more awake today. Refusing to eat.    Review of Systems  Objective:     Vital Signs (Most Recent):  Temp: 98.2 °F (36.8 °C) (08/14/19 1055)  Pulse: 87 (08/14/19 1415)  Resp: 18 (08/14/19 1415)  BP: (!) 146/108 (08/14/19 1415)  SpO2: 95 % (08/14/19 1415) Vital Signs (24h Range):  Temp:  [98 °F (36.7 °C)-98.2 °F (36.8 °C)] 98.2 °F (36.8 °C)  Pulse:  [] 87  Resp:  [9-25] 18  SpO2:  [95 %-100 %] 95 %  BP: (103-162)/() 146/108     Weight: 56.5 kg (124 lb 9 oz)  Body mass index is 20.73 kg/m².    Intake/Output Summary (Last 24 hours) at 8/14/2019 1729  Last data filed at 8/14/2019 1400  Gross per 24 hour   Intake 2071.67 ml   Output 2500 ml   Net -428.33 ml      Physical Exam  Gen, still confused, but more awake, though  Lungs clear to auscultation bilateral  Heart, regular rhythm and rate  Abdomen, soft nontender nondistended  Extremities: she does have a decubitus sacral ulcer, she also has a right posterior leg ulcer covered with dressings      Significant Labs:   BMP:   Recent Labs   Lab 08/14/19  0300 08/14/19  1423     --      --    K 3.2*  --    CL 93*  --    CO2 23  --    BUN 13  --    CREATININE 6.2*  --    CALCIUM 8.5*  --    MG  --  1.8     CBC:   Recent Labs   Lab 08/13/19  0303 08/13/19  1930 08/14/19  0300   WBC 3.07* 3.38* 3.23*   HGB 9.1* 9.9* 9.7*   HCT 27.9* 29.2* 28.5*   * 119* 122*           Assessment/Plan:      Active Diagnoses:    Diagnosis Date Noted POA    Altered mental status [R41.82] 08/12/2019 Yes    Encephalomalacia on imaging study [G93.89] 08/06/2019 Yes    Pneumonia due to  infectious organism [J18.9] 08/06/2019 Yes    ESRD (end stage renal disease) [N18.6] 05/11/2016 Yes      Problems Resolved During this Admission:    Diagnosis Date Noted Date Resolved POA    PRINCIPAL PROBLEM:  Encephalopathy, metabolic  [G92] 08/06/2019 08/09/2019 Yes    Disorientation [R41.0] 08/06/2019 08/09/2019 Yes    Hypoglycemia [E16.2] 08/06/2019 08/09/2019 Yes    Elevated lactic acid level [R79.89] 08/06/2019 08/09/2019 Yes    Altered mental status [R41.82] 08/06/2019 08/09/2019 Yes    Atrial fibrillation with RVR [I48.91] 07/18/2017 08/09/2019 Yes     VTE Risk Mitigation (From admission, onward)        Ordered     heparin (porcine) injection 5,000 Units  Every 8 hours      08/06/19 2001     Place ROSANNA hose  Until discontinued      08/06/19 2001     IP VTE HIGH RISK PATIENT  Once      08/06/19 2001           Encephalopathy, metabolic   AMS  - she continues to be waxing and waning  As per cardiology, who knows the patient, this has been recurrent, for the last 3-4 months  Multifactorial - likely metabolic +/- uremia +/- infectious process  +/- hypoglycemia +/- medications induced. - continue antibiotics will broaden coverage given worsening clinical status, re-culture  - neurology following -   - continue keppra. Vimpat redosed per neurology.   - continue monitoring in icu for closer monitoring and continuous EEG per Dr. Ramses Florentino, need to r/o non-convulsive status given hx of seizure d/o      Hypoglycemia        - likely due poor po intake due to AMS       - continue D10 at 50 cc/hr, frequent accuchecks        - continue to hold insulin, hold any po antihypoglycemics   - she might need GT placement, if she continues to refuse to eat     Pneumonia due to infectious organism  On admit CXR shows persistence of predominantly ground-glass type pulmonary opacities in the right mid and lower lung zone,   -Continue antibiotics     Altered mental status  Etiology unclear. See  above     HTN,             -continue to  hold po agents for now with labile pressures      Atrial fibrillation with RVR  Patient with PAF on admit telemetry showed a-fib rvr, started on cardizem gtt in ED  -Continuous cardiac monitoring   -cardizem gtt if bp will tolerate     ESRD (end stage renal disease)  Nephrology consulted for inpatient HD           Amos Snyder MD  Department of Hospital Medicine   [facility]

## 2019-08-15 PROBLEM — E16.2 HYPOGLYCEMIA, UNSPECIFIED: Status: ACTIVE | Noted: 2019-08-15

## 2019-08-15 LAB
ANION GAP SERPL CALC-SCNC: 21 MMOL/L (ref 8–16)
BASOPHILS # BLD AUTO: 0.01 K/UL (ref 0–0.2)
BASOPHILS NFR BLD: 0.3 % (ref 0–1.9)
BUN SERPL-MCNC: 7 MG/DL (ref 8–23)
CALCIUM SERPL-MCNC: 8.4 MG/DL (ref 8.7–10.5)
CHLORIDE SERPL-SCNC: 95 MMOL/L (ref 95–110)
CO2 SERPL-SCNC: 21 MMOL/L (ref 23–29)
CORTIS SERPL-MCNC: 14.1 UG/DL
CORTIS SERPL-MCNC: 24.6 UG/DL
CORTIS SERPL-MCNC: 27.8 UG/DL
CREAT SERPL-MCNC: 4.7 MG/DL (ref 0.5–1.4)
DIFFERENTIAL METHOD: ABNORMAL
EOSINOPHIL # BLD AUTO: 0.1 K/UL (ref 0–0.5)
EOSINOPHIL NFR BLD: 3.1 % (ref 0–8)
ERYTHROCYTE [DISTWIDTH] IN BLOOD BY AUTOMATED COUNT: 22.5 % (ref 11.5–14.5)
EST. GFR  (AFRICAN AMERICAN): 10 ML/MIN/1.73 M^2
EST. GFR  (NON AFRICAN AMERICAN): 8.7 ML/MIN/1.73 M^2
GLUCOSE SERPL-MCNC: 121 MG/DL (ref 70–110)
GLUCOSE SERPL-MCNC: 55 MG/DL (ref 70–110)
GLUCOSE SERPL-MCNC: 58 MG/DL (ref 70–110)
GLUCOSE SERPL-MCNC: 58 MG/DL (ref 70–110)
GLUCOSE SERPL-MCNC: 64 MG/DL (ref 70–110)
GLUCOSE SERPL-MCNC: 67 MG/DL (ref 70–110)
GLUCOSE SERPL-MCNC: 67 MG/DL (ref 70–110)
GLUCOSE SERPL-MCNC: 69 MG/DL (ref 70–110)
GLUCOSE SERPL-MCNC: 73 MG/DL (ref 70–110)
GLUCOSE SERPL-MCNC: 75 MG/DL (ref 70–110)
GLUCOSE SERPL-MCNC: 78 MG/DL (ref 70–110)
GLUCOSE SERPL-MCNC: 80 MG/DL (ref 70–110)
GLUCOSE SERPL-MCNC: 88 MG/DL (ref 70–110)
HCT VFR BLD AUTO: 26.9 % (ref 37–48.5)
HGB BLD-MCNC: 8.9 G/DL (ref 12–16)
IMM GRANULOCYTES # BLD AUTO: 0.01 K/UL (ref 0–0.04)
IMM GRANULOCYTES NFR BLD AUTO: 0.3 % (ref 0–0.5)
LYMPHOCYTES # BLD AUTO: 0.9 K/UL (ref 1–4.8)
LYMPHOCYTES NFR BLD: 25.8 % (ref 18–48)
MAGNESIUM SERPL-MCNC: 2 MG/DL (ref 1.6–2.6)
MCH RBC QN AUTO: 26.1 PG (ref 27–31)
MCHC RBC AUTO-ENTMCNC: 33.1 G/DL (ref 32–36)
MCV RBC AUTO: 79 FL (ref 82–98)
MONOCYTES # BLD AUTO: 0.6 K/UL (ref 0.3–1)
MONOCYTES NFR BLD: 17.6 % (ref 4–15)
NEUTROPHILS # BLD AUTO: 1.9 K/UL (ref 1.8–7.7)
NEUTROPHILS NFR BLD: 52.9 % (ref 38–73)
NRBC BLD-RTO: 0 /100 WBC
PLATELET # BLD AUTO: 117 K/UL (ref 150–350)
PMV BLD AUTO: ABNORMAL FL (ref 9.2–12.9)
POTASSIUM SERPL-SCNC: 3.1 MMOL/L (ref 3.5–5.1)
RBC # BLD AUTO: 3.41 M/UL (ref 4–5.4)
SODIUM SERPL-SCNC: 137 MMOL/L (ref 136–145)
VANCOMYCIN SERPL-MCNC: 21.1 UG/ML
WBC # BLD AUTO: 3.57 K/UL (ref 3.9–12.7)

## 2019-08-15 PROCEDURE — 80202 ASSAY OF VANCOMYCIN: CPT

## 2019-08-15 PROCEDURE — 83735 ASSAY OF MAGNESIUM: CPT

## 2019-08-15 PROCEDURE — C9254 INJECTION, LACOSAMIDE: HCPCS | Performed by: NURSE PRACTITIONER

## 2019-08-15 PROCEDURE — 63600175 PHARM REV CODE 636 W HCPCS: Performed by: PSYCHIATRY & NEUROLOGY

## 2019-08-15 PROCEDURE — 85025 COMPLETE CBC W/AUTO DIFF WBC: CPT

## 2019-08-15 PROCEDURE — 25000003 PHARM REV CODE 250

## 2019-08-15 PROCEDURE — 63600175 PHARM REV CODE 636 W HCPCS: Performed by: NURSE PRACTITIONER

## 2019-08-15 PROCEDURE — 82962 GLUCOSE BLOOD TEST: CPT

## 2019-08-15 PROCEDURE — 63600175 PHARM REV CODE 636 W HCPCS: Performed by: INTERNAL MEDICINE

## 2019-08-15 PROCEDURE — 82533 TOTAL CORTISOL: CPT | Mod: 91

## 2019-08-15 PROCEDURE — 25000003 PHARM REV CODE 250: Performed by: NURSE PRACTITIONER

## 2019-08-15 PROCEDURE — 25000003 PHARM REV CODE 250: Performed by: INTERNAL MEDICINE

## 2019-08-15 PROCEDURE — 20000000 HC ICU ROOM

## 2019-08-15 PROCEDURE — 94761 N-INVAS EAR/PLS OXIMETRY MLT: CPT

## 2019-08-15 PROCEDURE — 80048 BASIC METABOLIC PNL TOTAL CA: CPT

## 2019-08-15 RX ORDER — DICYCLOMINE HYDROCHLORIDE 10 MG/1
10 CAPSULE ORAL 4 TIMES DAILY PRN
Status: DISCONTINUED | OUTPATIENT
Start: 2019-08-15 | End: 2019-08-19 | Stop reason: HOSPADM

## 2019-08-15 RX ORDER — COSYNTROPIN 0.25 MG/ML
0.25 INJECTION, POWDER, FOR SOLUTION INTRAMUSCULAR; INTRAVENOUS ONCE
Status: COMPLETED | OUTPATIENT
Start: 2019-08-15 | End: 2019-08-15

## 2019-08-15 RX ORDER — PHENYLEPHRINE HYDROCHLORIDE 10 MG/ML
INJECTION INTRAVENOUS
Status: DISPENSED
Start: 2019-08-15 | End: 2019-08-15

## 2019-08-15 RX ORDER — POTASSIUM CHLORIDE 14.9 MG/ML
20 INJECTION INTRAVENOUS ONCE
Status: COMPLETED | OUTPATIENT
Start: 2019-08-15 | End: 2019-08-15

## 2019-08-15 RX ADMIN — DEXTROSE MONOHYDRATE 250 MG: 50 INJECTION, SOLUTION INTRAVENOUS at 01:08

## 2019-08-15 RX ADMIN — HEPARIN SODIUM 5000 UNITS: 5000 INJECTION INTRAVENOUS; SUBCUTANEOUS at 12:08

## 2019-08-15 RX ADMIN — HEPARIN SODIUM 5000 UNITS: 5000 INJECTION INTRAVENOUS; SUBCUTANEOUS at 06:08

## 2019-08-15 RX ADMIN — CHLORHEXIDINE GLUCONATE 15 ML: 1.2 RINSE ORAL at 08:08

## 2019-08-15 RX ADMIN — MUPIROCIN: 20 OINTMENT TOPICAL at 08:08

## 2019-08-15 RX ADMIN — DEXTROSE MONOHYDRATE 500 MG: 50 INJECTION, SOLUTION INTRAVENOUS at 12:08

## 2019-08-15 RX ADMIN — LEVETIRACETAM 250 MG: 100 INJECTION, SOLUTION, CONCENTRATE INTRAVENOUS at 10:08

## 2019-08-15 RX ADMIN — COSYNTROPIN 0.25 MG: 0.25 INJECTION, POWDER, LYOPHILIZED, FOR SOLUTION INTRAMUSCULAR; INTRAVENOUS at 10:08

## 2019-08-15 RX ADMIN — SODIUM CHLORIDE 50 MG: 9 INJECTION, SOLUTION INTRAVENOUS at 12:08

## 2019-08-15 RX ADMIN — POTASSIUM CHLORIDE 20 MEQ: 14.9 INJECTION, SOLUTION INTRAVENOUS at 11:08

## 2019-08-15 RX ADMIN — SODIUM CHLORIDE 50 MG: 9 INJECTION, SOLUTION INTRAVENOUS at 02:08

## 2019-08-15 RX ADMIN — ASPIRIN 81 MG: 81 TABLET, COATED ORAL at 08:08

## 2019-08-15 RX ADMIN — ONDANSETRON 4 MG: 2 INJECTION INTRAMUSCULAR; INTRAVENOUS at 04:08

## 2019-08-15 RX ADMIN — DILTIAZEM HYDROCHLORIDE 5 MG/HR: 5 INJECTION INTRAVENOUS at 02:08

## 2019-08-15 RX ADMIN — PIPERACILLIN AND TAZOBACTAM 4.5 G: 4; .5 INJECTION, POWDER, LYOPHILIZED, FOR SOLUTION INTRAVENOUS; PARENTERAL at 08:08

## 2019-08-15 RX ADMIN — HEPARIN SODIUM 5000 UNITS: 5000 INJECTION INTRAVENOUS; SUBCUTANEOUS at 08:08

## 2019-08-15 NOTE — PLAN OF CARE
Problem: Adult Inpatient Plan of Care  Goal: Plan of Care Review  Outcome: Ongoing (interventions implemented as appropriate)     08/15/19 5002   Plan of Care Review   Plan of Care Reviewed With family   Progress improving       Comments: PT off of D20 gtt and cardizem. Less combative. Restraints discontinued. Confused off and on. No complaints. Poor appetite. Still waiting for BG to drop below 55 to draw labs ordered by endocrinology

## 2019-08-15 NOTE — PROCEDURES
Date of Study: 8/14.    Physician Requesting:  Dr. Snyder.     Reason for EEG: Seizures.     Clinical History: 73  year-old patient with history of seizures.  Patient is on Keppra, Vimpat and Depakote.     Methods:  EEG was done according to the 10/20 international system.  This is a 29 min routine EEG (2 combined studies). Bipolar and referential montages were used.     Findings:      EEG overall symmetric with continuous polymorphic waveforms.      Background rhythm: Theta dominant while eyes are closed and the patient is awake.    PDR: 5-7 Hz. Bilateral delta waves are seen.  Mental status: Patient is drowsy during the study.      Artifact:There is occasional eye movement, lead and ECG artifacts.      Sleep: None seen.    Epileptiform discharges: None.    Activation procedures:  Photic stimulation was performed with no abnormalities seen.    Abnormal activity: None.    ECG: Regular rhythm.          Impression:  Moderate encephalopathy. No Seizures. Improved from previous study.

## 2019-08-15 NOTE — PROGRESS NOTES
[facility] hospital Medicine  Progress Note    Patient Name: Griselda Neely  MRN: [id number]  Patient Class: IP- Inpatient   Admission Date: 8/6/2019  Length of Stay: 9 days  Attending Physician: Amos Snyder MD  Primary Care Provider: Kalie Neely MD        Subjective:     Principal Problem:Encephalopathy, toxic    Interval History:, seen and examined likely closer to her baseline examined, the patient is more awake and alert today    Review of Systems  Objective:     Vital Signs (Most Recent):  Temp: 98.2 °F (36.8 °C) (08/15/19 0400)  Pulse: 102 (08/15/19 1200)  Resp: 18 (08/15/19 1200)  BP: (!) 111/57 (08/15/19 1200)  SpO2: 96 % (08/15/19 1200) Vital Signs (24h Range):  Temp:  [98.2 °F (36.8 °C)-98.5 °F (36.9 °C)] 98.2 °F (36.8 °C)  Pulse:  [] 102  Resp:  [14-32] 18  SpO2:  [95 %-100 %] 96 %  BP: ()/() 111/57     Weight: 56.5 kg (124 lb 9 oz)  Body mass index is 20.73 kg/m².    Intake/Output Summary (Last 24 hours) at 8/15/2019 1403  Last data filed at 8/15/2019 0500  Gross per 24 hour   Intake 1900 ml   Output --   Net 1900 ml      Physical Exam  General, no acute distress, was confused  Lungs, clear to auscultation bilaterally  Heart, regular rhythm and rate, no audible murmurs  Abdomen soft nontender nondistended  Extremities are right posterior thigh ulcer, covered with dressings, also stage II sacral decubitus ulcer, with dressings        Significant Labs:   BMP:   Recent Labs   Lab 08/15/19  0400   GLU 88      K 3.1*   CL 95   CO2 21*   BUN 7*   CREATININE 4.7*   CALCIUM 8.4*   MG 2.0     CBC:   Recent Labs   Lab 08/13/19  1930 08/14/19  0300 08/15/19  0400   WBC 3.38* 3.23* 3.57*   HGB 9.9* 9.7* 8.9*   HCT 29.2* 28.5* 26.9*   * 122* 117*         Assessment/Plan:      Active Diagnoses:    Diagnosis Date Noted POA    Altered mental status [R41.82] 08/12/2019 Yes    Encephalomalacia on imaging study [G93.89] 08/06/2019 Yes    Pneumonia due to  infectious organism [J18.9] 08/06/2019 Yes    ESRD (end stage renal disease) [N18.6] 05/11/2016 Yes      Problems Resolved During this Admission:    Diagnosis Date Noted Date Resolved POA    PRINCIPAL PROBLEM:  Encephalopathy, metabolic  [G92] 08/06/2019 08/09/2019 Yes    Disorientation [R41.0] 08/06/2019 08/09/2019 Yes    Hypoglycemia [E16.2] 08/06/2019 08/09/2019 Yes    Elevated lactic acid level [R79.89] 08/06/2019 08/09/2019 Yes    Altered mental status [R41.82] 08/06/2019 08/09/2019 Yes    Atrial fibrillation with RVR [I48.91] 07/18/2017 08/09/2019 Yes     VTE Risk Mitigation (From admission, onward)        Ordered     heparin (porcine) injection 5,000 Units  Every 8 hours      08/06/19 2001     Place ROSANNA hose  Until discontinued      08/06/19 2001     IP VTE HIGH RISK PATIENT  Once      08/06/19 2001          Encephalopathy, metabolic   AMS  - waxing and waning  Continues to improve, still not wanting to eat, likely close to her baseline  Multifactorial - likely metabolic +/- uremia +/- infectious process  +/- hypoglycemia +/- medications induced. - continue antibiotics will broaden coverage given worsening clinical status, re-culture  - neurology following -   - continue keppra. Vimpat redosed per neurology.   - move to icu for closer monitoring and continuous EEG per Dr. Ramses Florentino, need to r/o non-convulsive status given hx of seizure d/o   - if she continues to improve, possible transfer tomorrow to telemetry     Hypoglycemia  - poor po intake due to AMS  - continue D10 at 50 cc/hr, frequent accuchecks   - continue to hold insulin, hold any po antihypoglycemics   - endocrinology consult in place, appreciate input    Failure to thrive  - poor prognosis, if she does not start eating  - family informed about GT, they will consider it    Diabetes  - ISSS, hold PO meds     Pneumonia due to infectious organism  On admit CXR showed persistence of predominantly ground-glass type pulmonary opacities in the  right mid and lower lung zone,   -Continue zosyn    HTN,   - continue to  hold po agents for now with labile pressures      Atrial fibrillation with RVR  Patient with PAF on admit telemetry showed a-fib rvr, started on cardizem gtt in ED  -Continuous cardiac monitoring   -cardizem gtt if bp will tolerate     ESRD (end stage renal disease)  Nephrology consulted for inpatient HD       Amos Snyder MD  Department of Hospital Medicine   [facility]

## 2019-08-15 NOTE — PROGRESS NOTES
[facility]  Cardiology  Progress Note    Patient Name: Griselda Neely  MRN: [id number]  Admission Date: 8/6/2019  Hospital Length of Stay: 9 days  Code Status: Full Code   Attending Physician: Amos Snyder MD   Primary Care Physician: Kalie Neely MD  Expected Discharge Date:   Principal Problem:Encephalopathy, toxic    Subjective:       Interval History: Patient is calm and resting at present. Responds to verbal stimuli with minimal 1 word responses. She has tolerated having the restraints off. Cardizem drip at 5 mg / hour with controlled rates and blood pressure. She is still not eating much.     ROS:  Not obtainable.  Patient confused.     Cardiology:  Atrial fibrillation, rate controlled on Cardizem at 5 mg/hr  Objective:     Vital Signs (Most Recent):  Temp: 98.2 °F (36.8 °C) (08/15/19 0400)  Pulse: 95 (08/15/19 0800)  Resp: 16 (08/15/19 0800)  BP: (!) 89/53 (08/15/19 0715)  SpO2: 98 % (08/15/19 0800) Vital Signs (24h Range):  Temp:  [98.2 °F (36.8 °C)-98.5 °F (36.9 °C)] 98.2 °F (36.8 °C)  Pulse:  [] 95  Resp:  [9-32] 16  SpO2:  [95 %-100 %] 98 %  BP: ()/() 89/53     Weight: 56.5 kg (124 lb 9 oz)  Body mass index is 20.73 kg/m².    SpO2: 98 %  O2 Device (Oxygen Therapy): nasal cannula      Intake/Output Summary (Last 24 hours) at 8/15/2019 1017  Last data filed at 8/15/2019 0500  Gross per 24 hour   Intake 2500 ml   Output 2500 ml   Net 0 ml       Lines/Drains/Airways     Central Venous Catheter Line                 Percutaneous Central Line Insertion/Assessment - triple lumen  08/12/19 1921 2 days          Drain                 Hemodialysis AV Fistula 08/08/19 0214 Right upper arm 7 days          Peripheral Intravenous Line                 Peripheral IV - Single Lumen 08/11/19 2330 22 G Anterior;Left Forearm 3 days         Midline Catheter Insertion/Assessment  - Single Lumen 08/12/19 1700 Left median cubital vein (antecubital fossa) 2 days                Scheduled  Meds:   aspirin  81 mg Oral Daily    chlorhexidine  15 mL Mouth/Throat BID    epoetin alfonzo-ebpx (RETACRIT) injection  50 Units/kg Intravenous Every Mon, Wed, Fri    heparin (porcine)  5,000 Units Subcutaneous Q8H    lacosamide (VIMPAT) IVPB  50 mg Intravenous Q12H    levetiracetam IVPB  250 mg Intravenous Daily    mupirocin   Nasal BID    piperacillin-tazobactam (ZOSYN) IVPB  4.5 g Intravenous Q12H    potassium chloride in water  20 mEq Intravenous Once    sodium thiosulfate  12.5 g Intravenous Every Mon, Wed, Fri    valproate sodium (DEPACON) IVPB  250 mg Intravenous Q24H    valproate sodium (DEPACON) IVPB  500 mg Intravenous Q24H     Continuous Infusions:   dilTIAZem 5 mg/hr (08/15/19 0207)    norepinephrine bitartrate-D5W Stopped (08/12/19 2000)     PRN Meds:.sodium chloride 0.9%, dextrose 50%, glucagon (human recombinant), insulin aspart U-100, levetiracetam IVPB, metoprolol, ondansetron, piperacillin-tazobactam (ZOSYN) IVPB, sodium chloride 0.9%     Physical Exam     HEENT: Normocephalic, atraumatic, PERRL, Conjunctiva pink, no scleral icterus.   CVS: S1S2+, Irregular, + SM, rubs or gallops, JVP: Normal.  LUNGS: Clear, diminished in the bases.   ABDOMEN: Soft, NT, BS+  EXTREMITIES: No cyanosis, clubbing or edema. Left upper arm fistula + thrill + bruit. Left thigh calciphylaxis ulcer with dressing CDI.   NEURO: Confused, drowsy.         Significant Labs:   Blood Culture:   No results for input(s): LABBLOO in the last 48 hours., BMP:   Recent Labs   Lab 08/14/19  0300 08/14/19  1423 08/15/19  0400     --  88     --  137   K 3.2*  --  3.1*   CL 93*  --  95   CO2 23  --  21*   BUN 13  --  7*   CREATININE 6.2*  --  4.7*   CALCIUM 8.5*  --  8.4*   MG  --  1.8 2.0   , CMP   Recent Labs   Lab 08/14/19  0300 08/15/19  0400    137   K 3.2* 3.1*   CL 93* 95   CO2 23 21*    88   BUN 13 7*   CREATININE 6.2* 4.7*   CALCIUM 8.5* 8.4*   ANIONGAP 20* 21*   ESTGFRAFRICA 7.2* 10.0*    EGFRNONAA 6.2* 8.7*   , CBC   Recent Labs   Lab 08/13/19  1930 08/14/19  0300 08/15/19  0400   WBC 3.38* 3.23* 3.57*   HGB 9.9* 9.7* 8.9*   HCT 29.2* 28.5* 26.9*   * 122* 117*   , INR   No results for input(s): INR, PROTIME in the last 48 hours., Lipid Panel   No results for input(s): CHOL, HDL, LDLCALC, TRIG, CHOLHDL in the last 48 hours.,   Pathology Results  (Last 10 years)    None          Significant Imaging:  I have reviewed the imaging all significant imaging for the last 24 hours.      Assessment and Plan:     IMPRESSION:  1. Altered mental status. Secondary to seizure activity vs hypoglyemia. Head CT negative. EEG consistent with encephalopathy, negative for seizure activity. Waxing and waning symptoms. Etiology? Neurology on board. EEG from yesterday without seizure activity, improving.   2. Congestive heart failure. Acute on chronic. LVEF ~40-45% on last echo 7/2019. Euvolemic on exam.   3. Hypoglycemia. Etiology likely poor oral intake. Endocrine work up in progress. D 20 currently off.   4. Calciphlyaxis. Left leg ulcer. Undergoing wound care treatments.   5. Atrial fibrillation. Valvular. Persistent. Patient does not wish to be on any oral anticoagulation or to be cardioverted. Currently rate controlled.   6. Hypertension. Better controlled at present. Elevates with agitation.   7. s/p MVR via right thoracotomy approach on 3/18/19 with Dr. Lora. Functioning appropriately per last echo.   8. Mildly elevated troponins in the presence of ESRD. Chronic. Not consistent with ACS.   9. CAD. Nonobstructive on [facility] done on 3/13/2019.   10. End-stage renal disease on hemodialysis MWF. Anuric. Followed by Dr. Ingram.   11. Hypocalcemia. Nephrology on board and managing.   12. Hypokalemia.  Replacement ordered.   13. Hyperlipidemia  14. Chronic tobacco abuse.  15. COPD on home O2 at 2L  16. Reported history of acute pancreatitis. Amylase, Lipase WNL.       PLAN:  1. Replace K+ with 20 meq IV x 1.  2.  Follow up on endocrine work up.  3. Continue to encourage oral intake. Feeding tube option discussed with patient and sister. They will consider this and understand implications.         Ramila Bloom, KANE  Cardiology  [facility]      I have personally seen and examined the patient. I reviewed the notes, assessments, and/or procedures performed by Ms Ramila Bloom, I concur with her documentation of Griselda Neely.

## 2019-08-15 NOTE — PT/OT/SLP PROGRESS
Physical Therapy      Patient Name:  Griselda Neely   MRN:  [id number]    Patient not seen today secondary to Other (Comment)(pt transferred to ICU, need new PT orders when medically stable. ). Will follow-up 08/16/19  .    Jackie Toro, PT

## 2019-08-15 NOTE — PROGRESS NOTES
[facility] hospital  Nephrology  Progress Note    Patient Name: Griselda Neely  MRN: [id number]  Admission Date: 8/6/2019  Hospital Length of Stay: 9 days  Attending Provider: Amos Snyder MD   Primary Care Physician: Kalie Neely MD  Principal Problem:Encephalopathy, toxic      Subjective:     Interval History: Alert but appears to be back to 'baseline' mental status; denies any SoB, chest discomfort, fever/chills or any additional complaints    Review of patient's allergies indicates:  No Known Allergies  Current Facility-Administered Medications   Medication Frequency    0.9%  NaCl infusion PRN    aspirin EC tablet 81 mg Daily    chlorhexidine 0.12 % solution 15 mL BID    dextrose 20 % infusion Continuous    dextrose 50% injection 12.5 g PRN    diltiaZEM 125 mg in D5W 125 mL infusion Continuous    epoetin alfonzo-epbx injection 2,800 Units Every Mon, Wed, Fri    glucagon (human recombinant) injection 1 mg PRN    heparin (porcine) injection 5,000 Units Q8H    insulin aspart U-100 pen 0-5 Units Q6H PRN    lacosamide (VIMPAT) 50 mg in sodium chloride 0.9% 100 mL IVPB Q12H    levETIRAcetam (KEPPRA) 250 mg in dextrose 5 % 100 mL IVPB Daily    levETIRAcetam (KEPPRA) 500 mg in dextrose 5 % 100 mL IVPB Daily PRN    metoprolol injection 5 mg Q6H PRN    mupirocin 2 % ointment BID    norepinephrine 4 mg in dextrose 5 % 250 mL infusion Continuous    ondansetron injection 4 mg Q4H PRN    piperacillin-tazobactam (ZOSYN) 0.75 g in dextrose 5 % 100 mL PRN    piperacillin-tazobactam 4.5 g in dextrose 5 % 100 mL IVPB (ready to mix system) Q12H    sodium chloride 0.9% flush 10 mL PRN    sodium thiosulfate 12.5 gram/50 mL (250 mg/mL) injection 12.5 g Every Mon, Wed, Fri    valproate (DEPACON) 250 mg in dextrose 5 % 50 mL IVPB Q24H    valproate (DEPACON) 500 mg in dextrose 5 % 50 mL IVPB Q24H       Objective:     Vital Signs (Most Recent):  Temp: 98.2 °F (36.8 °C) (08/15/19 0400)  Pulse: 95  (08/14/19 2022)  Resp: (!) 24 (08/14/19 2022)  BP: (!) 146/108 (08/14/19 1415)  SpO2: 95 % (08/14/19 2022)  O2 Device (Oxygen Therapy): room air (08/14/19 2022) Vital Signs (24h Range):  Temp:  [98 °F (36.7 °C)-98.5 °F (36.9 °C)] 98.2 °F (36.8 °C)  Pulse:  [] 95  Resp:  [9-32] 24  SpO2:  [95 %-100 %] 95 %  BP: (103-162)/() 146/108     Weight: 56.5 kg (124 lb 9 oz) (08/15/19 0400)  Body mass index is 20.73 kg/m².  Body surface area is 1.61 meters squared.    I/O last 3 completed shifts:  In: 3241.7 [I.V.:1741.7; Other:500; IV Piggyback:1000]  Out: 2500 [Other:2500]    Physical Exam   Constitutional: No distress.   Chronically ill & somewhat malnourished-appearing   HENT:   Head: Normocephalic and atraumatic.   Mouth/Throat: Oropharynx is clear and moist.   Eyes: Conjunctivae are normal. Right eye exhibits no discharge. Left eye exhibits no discharge. No scleral icterus.   Neck: Normal range of motion. Neck supple. No JVD present.   Cardiovascular: Normal rate. Exam reveals no gallop and no friction rub.   Murmur heard.  Irregularly irregular rhythm   Pulmonary/Chest: Effort normal and breath sounds normal. No respiratory distress. She has no rales.   Abdominal: Soft. She exhibits no distension and no mass. There is no tenderness.   Hypoactive BS in all quadrants   Genitourinary:   Genitourinary Comments: Deferred   Musculoskeletal: Normal range of motion. She exhibits no edema, tenderness or deformity.   R UE AV Fistula w/ palpable thrill   Neurological: She is alert.   Skin: Skin is warm and dry. She is not diaphoretic.   B LE Ulcers (Calciphylaxis)   Psychiatric:   Unable to assess   Nursing note and vitals reviewed.      Significant Labs:sure  CBC:   Recent Labs   Lab 08/15/19  0400   WBC 3.57*   RBC 3.41*   HGB 8.9*   HCT 26.9*   *   MCV 79*   MCH 26.1*   MCHC 33.1     CMP:   Recent Labs   Lab 08/13/19  0303  08/15/19  0400      < > 88   CALCIUM 7.8*   < > 8.4*   ALBUMIN 2.6*  --   --     PROT 6.0  --   --    *   < > 137   K 2.8*   < > 3.1*   CO2 22*   < > 21*   CL 97   < > 95   BUN 11   < > 7*   CREATININE 5.1*   < > 4.7*   ALKPHOS 49*  --   --    ALT 6*  --   --    AST 14  --   --    BILITOT 0.5  --   --     < > = values in this interval not displayed.       Significant Imaging:  X-Ray: Reviewed  CT: Reviewed    Assessment/Plan:     Active Diagnoses:    Diagnosis Date Noted POA    Altered mental status [R41.82] 08/12/2019 Yes    Encephalomalacia on imaging study [G93.89] 08/06/2019 Yes    Pneumonia due to infectious organism [J18.9] 08/06/2019 Yes    ESRD (end stage renal disease) [N18.6] 05/11/2016 Yes      Problems Resolved During this Admission:    Diagnosis Date Noted Date Resolved POA    PRINCIPAL PROBLEM:  Encephalopathy, metabolic  [G92] 08/06/2019 08/09/2019 Yes    Disorientation [R41.0] 08/06/2019 08/09/2019 Yes    Hypoglycemia [E16.2] 08/06/2019 08/09/2019 Yes    Elevated lactic acid level [R79.89] 08/06/2019 08/09/2019 Yes    Altered mental status [R41.82] 08/06/2019 08/09/2019 Yes    Atrial fibrillation with RVR [I48.91] 07/18/2017 08/09/2019 Yes     1 ESRD (HD-MWF via R UE AV Fistula)- clinically stable at present; no overt volume overload, uremic signs/symptoms, electrolyte perturbations nor acid-base disturbance; no active issues    -maintenance HD (duration: 3h; UF Goal: 2-3L as tolerated;  Standard 'bath'; Access: AVF; Qb: 400 ml/min, Qd: 2x BFR; 12.5 gm of Na thiosulfate over last 30-60 min of treatment) per outpatient schedule    -DAILY renal function panel to document sCr trend, optimize electrolyte 'bath' & assess response to therapy    -avoid nephrotoxic agents (NSAIDs, IV contrast dye, MRI w/ Gadolinium constrast)    -renally dose all appropriate medications, including antibiotics    2. HYPOKALEMIA (3.1)- clinically stable at present (s/p IV repletion); etiology unclear no active issues    -recommend KCl 'rider' (40 mEq in 0.45% NS to run over 3-4h); 3K Bath  during HD     3. HTN- clinically stable at present; BP NOT AT GOAL (161/90); no active issues    -continue current anti-HTN drug regimen (GIVE BP MEDS POST HD on HD Days) +/- HD-associated UF     4. ANEMIA- clinically stable at present; H/H AT GOAL (8.9/26.9); no active issues    -trend DAILY CBC (H/H) to effect optimal blood-loss surveillance     5. SECONDARY HYPERPARATHYROIDISM (sHPT)-- clinically stable on current therapy (Na thiosulfate 12.5 gm x last 30-60 min of HD); no active issues    -continue dietary binder/Vitamin D +/- HD-associated calcimimetic therapy (Cinacalcet vs Etelcalcetide)    6. ALTERED MENTAL STATUS- remains acutely confused & markedly restless this am; etiology likely multifactorial; Neurology service on the case    -management per Neurology/Primary team recommendations    Thank you for your consult. I will follow-up with patient. Please contact us if you have any additional questions.    Dane Martinez III, MD  [facility]  [facility]  [phone] (C)

## 2019-08-15 NOTE — PROGRESS NOTES
[facility]  Neurology  Progress Note    Patient Name: Griselda Neely  MRN: [id number]  Admission Date: 8/6/2019  Hospital Length of Stay: 9 days  Code Status: Full Code   Attending Provider: Amos Snyder MD  Primary Care Physician: Kalie Neely MD   Principal Problem:Encephalopathy, toxic    Subjective:     Interval History: Patient seen and examined, she is awake and oriented to person and place, follows commands, and is talking to me.     Current Neurological Medications: MAR reviewed    HPI: Griselda Neely is a 73 y.o. female with a history as below who presented to the ED from her cardiologist office via  for evaluation of altered mental status changes.  Sister at bedside reports patient has been in [facility] s/p discharge from hospital ~ 3 weeks ago due to encephalopathy. Pt was started on Keppra and Vimpat during that admission. Sister at bedside further states patient stopped eating and drinking on Friday. Further reports patient with ESRD, had dialysis on Wednesday and Friday but was taken off early. Also reports went to HD on Monday.  Per chart review, patient hospitalized In July 2019 and developed acute encephalopathy during that admission, neurology was consulted with extensive work-up and non-acute findings. On admit, labs and imaging reviewed.  Neurology consulted with recs for MRI/MRA and EEG.     CRT: 6.7  K: 3.4  LDL: 55.2  Ammonia: 18  B12: 1045  TSH: 2.7  AED levels: pending      Brain imaging:  CT head 8/12/19: 1. No acute intracranial abnormality.  2. Chronic small vessel ischemic changes.     CT head:   No acute intracranial abnormality.        EEG: This is an abnormal awake and drowsy routine EEG due to diffuse slowing of the background activity consistent with a mild to moderate encephalopathy.     EEG 8/14/19: Moderate encephalopathy. No Seizures. Improved from previous study.       Current Facility-Administered Medications   Medication Dose Route  Frequency Provider Last Rate Last Dose    0.9%  NaCl infusion   Intravenous PRN Dane Martinez MD        aspirin EC tablet 81 mg  81 mg Oral Daily TODD Rush   81 mg at 08/15/19 0835    chlorhexidine 0.12 % solution 15 mL  15 mL Mouth/Throat BID Lizett Mann PharmD   15 mL at 08/15/19 0835    dextrose 50% injection 12.5 g  12.5 g Intravenous PRN TODD Rush   12.5 g at 08/13/19 1743    diltiaZEM 125 mg in D5W 125 mL infusion  5 mg/hr Intravenous Continuous Katharina Powell MD 5 mL/hr at 08/15/19 0207 5 mg/hr at 08/15/19 0207    epoetin alfonzo-epbx injection 2,800 Units  50 Units/kg Intravenous Every Mon, Wed, Fri Chalino Victoria MD   2,800 Units at 08/14/19 1256    glucagon (human recombinant) injection 1 mg  1 mg Intramuscular PRN TODD Rush        heparin (porcine) injection 5,000 Units  5,000 Units Subcutaneous Q8H TODD Rush   5,000 Units at 08/15/19 0835    insulin aspart U-100 pen 0-5 Units  0-5 Units Subcutaneous Q6H PRN TODD Rush        lacosamide (VIMPAT) 50 mg in sodium chloride 0.9% 100 mL IVPB  50 mg Intravenous Q12H Maggie Blood  mL/hr at 08/15/19 0030 50 mg at 08/15/19 0030    levETIRAcetam (KEPPRA) 250 mg in dextrose 5 % 100 mL IVPB  250 mg Intravenous Daily Jef Florentino  mL/hr at 08/15/19 1000 250 mg at 08/15/19 1000    levETIRAcetam (KEPPRA) 500 mg in dextrose 5 % 100 mL IVPB  500 mg Intravenous Daily PRN Maggie Blood NP        metoprolol injection 5 mg  5 mg Intravenous Q6H PRN Daren Lea MD   5 mg at 08/12/19 1658    mupirocin 2 % ointment   Nasal BID Lizett Mann PharmD        norepinephrine 4 mg in dextrose 5 % 250 mL infusion  0.02 mcg/kg/min Intravenous Continuous Daren Lea MD   Stopped at 08/12/19 2000    ondansetron injection 4 mg  4 mg Intravenous Q4H PRN Hilary La MD   4 mg at 08/15/19 0458    piperacillin-tazobactam (ZOSYN) 0.75 g in dextrose 5 %  100 mL  0.75 g Intravenous PRN Daren Lea MD        piperacillin-tazobactam 4.5 g in dextrose 5 % 100 mL IVPB (ready to mix system)  4.5 g Intravenous Q12H Daren Lea MD 25 mL/hr at 08/15/19 0835 4.5 g at 08/15/19 0835    sodium chloride 0.9% flush 10 mL  10 mL Intravenous PRN TODD Rush        sodium thiosulfate 12.5 gram/50 mL (250 mg/mL) injection 12.5 g  12.5 g Intravenous Every Mon, Wed, Fri Thomas John Martinez MD   12.5 g at 08/14/19 1320    valproate (DEPACON) 250 mg in dextrose 5 % 50 mL IVPB  250 mg Intravenous Q24H Maggie Blood, NP 50 mL/hr at 08/14/19 1441 250 mg at 08/14/19 1441    valproate (DEPACON) 500 mg in dextrose 5 % 50 mL IVPB  500 mg Intravenous Q24H Maggie Blood, NP 50 mL/hr at 08/15/19 0030 500 mg at 08/15/19 0030       Review of Systems   Constitutional: Negative.    Respiratory: Negative.    Cardiovascular: Negative.    Gastrointestinal: Negative.    Musculoskeletal: Negative.    Neurological: Positive for seizures.     Objective:     Vital Signs (Most Recent):  Temp: 98.2 °F (36.8 °C) (08/15/19 0400)  Pulse: 95 (08/15/19 0800)  Resp: 16 (08/15/19 0800)  BP: (!) 89/53 (08/15/19 0715)  SpO2: 98 % (08/15/19 0800) Vital Signs (24h Range):  Temp:  [98.2 °F (36.8 °C)-98.5 °F (36.9 °C)] 98.2 °F (36.8 °C)  Pulse:  [] 95  Resp:  [9-32] 16  SpO2:  [95 %-100 %] 98 %  BP: ()/() 89/53     Weight: 56.5 kg (124 lb 9 oz)  Body mass index is 20.73 kg/m².    Physical Exam   Constitutional: She appears well-developed and well-nourished.   HENT:   Head: Normocephalic and atraumatic.   Eyes: Pupils are equal, round, and reactive to light. EOM are normal.   Neck: Normal range of motion. Neck supple.   Cardiovascular: Normal rate, regular rhythm and normal heart sounds.   Pulmonary/Chest: Effort normal and breath sounds normal.   Abdominal: Soft.   Musculoskeletal: Normal range of motion.   Neurological: She is alert. She has normal strength.   Skin: Skin is warm and  dry.   Psychiatric: She has a normal mood and affect. Her speech is normal.   Vitals reviewed.      NEUROLOGICAL EXAMINATION:     MENTAL STATUS   Oriented to person.   Oriented to place.   Disoriented to time.   Speech: speech is normal   Level of consciousness: alert    CRANIAL NERVES   Cranial nerves II through XII intact.     CN III, IV, VI   Pupils are equal, round, and reactive to light.  Extraocular motions are normal.     MOTOR EXAM     Strength   Strength 5/5 throughout.     SENSORY EXAM   Light touch normal.           Assessment and Plan:   Encephalopathy  -Likely metabolic, multifactorial including dehydration, infection, and hypokalemia  -EEG: improving  -B12, TSH, Folate, Ammonia WNL  -Previous MRI brain done July 2019 negative acute, shows remote ischemia      History of seizures  -Abnormal EEGs on previous admit and pt started on Vimpat and Keppra  -EEG: no seizures  -Increase keppra to 750mg daily, given an additional 500mg after dialysis  -Continue Vimpat 50mg daily  -Continue depakote 250mg in the am and 500mg in the pm        ESRD  -Nephrology following      Pneumonia   -Management per IM    Hypoglycemia  - defer to  and endocrine     Seizure education.    No driving for now, no swimming alone, no climbing high areas, no operation of heavy machinery or working with high risk electricity equipment.  Continue to take AEDs as prescribed. If any major side effects from neurological medications go to the ED including mood changes and severe depression.  Patient and/or next of kin informed.  Follow up Neurology in 2 weeks at [phone].  Medication side effects discussed with the patient and/or caregiver.  Active Diagnoses:    Diagnosis Date Noted POA    Altered mental status [R41.82] 08/12/2019 Yes    Encephalomalacia on imaging study [G93.89] 08/06/2019 Yes    Pneumonia due to infectious organism [J18.9] 08/06/2019 Yes    ESRD (end stage renal disease) [N18.6] 05/11/2016 Yes      Problems  Resolved During this Admission:    Diagnosis Date Noted Date Resolved POA    PRINCIPAL PROBLEM:  Encephalopathy, metabolic  [G92] 08/06/2019 08/09/2019 Yes    Disorientation [R41.0] 08/06/2019 08/09/2019 Yes    Hypoglycemia [E16.2] 08/06/2019 08/09/2019 Yes    Elevated lactic acid level [R79.89] 08/06/2019 08/09/2019 Yes    Altered mental status [R41.82] 08/06/2019 08/09/2019 Yes    Atrial fibrillation with RVR [I48.91] 07/18/2017 08/09/2019 Yes       VTE Risk Mitigation (From admission, onward)        Ordered     heparin (porcine) injection 5,000 Units  Every 8 hours      08/06/19 2001     Place ROSANNA hose  Until discontinued      08/06/19 2001     IP VTE HIGH RISK PATIENT  Once      08/06/19 2001          Maggie Blood, KANE  Neurology  [facility]    I, Dr. Ramses Florentino, have personally seen and examined the patient with my advanced provider and agree with above. I personally did a focused exam, and reviewed all necessary clinical information. I discussed my management plan with my NP and agree with above. Patient at baseline. Following commands.

## 2019-08-15 NOTE — PLAN OF CARE
Problem: Oral Intake Inadequate  Goal: Improved Oral Intake  Outcome: Ongoing (interventions implemented as appropriate)  Intervention: Promote and Optimize Oral Intake  POOR PO intake

## 2019-08-16 LAB
ALBUMIN SERPL BCP-MCNC: 2.5 G/DL (ref 3.5–5.2)
ALBUMIN SERPL BCP-MCNC: 2.8 G/DL (ref 3.5–5.2)
ALP SERPL-CCNC: 56 U/L (ref 55–135)
ALT SERPL W/O P-5'-P-CCNC: 7 U/L (ref 10–44)
ANION GAP SERPL CALC-SCNC: 22 MMOL/L (ref 8–16)
ANION GAP SERPL CALC-SCNC: 23 MMOL/L (ref 8–16)
AST SERPL-CCNC: 12 U/L (ref 10–40)
BASOPHILS # BLD AUTO: 0.01 K/UL (ref 0–0.2)
BASOPHILS NFR BLD: 0.2 % (ref 0–1.9)
BILIRUB SERPL-MCNC: 0.9 MG/DL (ref 0.1–1)
BUN SERPL-MCNC: 11 MG/DL (ref 8–23)
BUN SERPL-MCNC: 12 MG/DL (ref 8–23)
CALCIUM SERPL-MCNC: 7.9 MG/DL (ref 8.7–10.5)
CALCIUM SERPL-MCNC: 8.1 MG/DL (ref 8.7–10.5)
CHLORIDE SERPL-SCNC: 95 MMOL/L (ref 95–110)
CHLORIDE SERPL-SCNC: 96 MMOL/L (ref 95–110)
CO2 SERPL-SCNC: 19 MMOL/L (ref 23–29)
CO2 SERPL-SCNC: 20 MMOL/L (ref 23–29)
CORTIS SERPL-MCNC: 18.3 UG/DL
CREAT SERPL-MCNC: 5.8 MG/DL (ref 0.5–1.4)
CREAT SERPL-MCNC: 6.3 MG/DL (ref 0.5–1.4)
DIFFERENTIAL METHOD: ABNORMAL
EOSINOPHIL # BLD AUTO: 0.1 K/UL (ref 0–0.5)
EOSINOPHIL NFR BLD: 1.7 % (ref 0–8)
ERYTHROCYTE [DISTWIDTH] IN BLOOD BY AUTOMATED COUNT: 22.5 % (ref 11.5–14.5)
EST. GFR  (AFRICAN AMERICAN): 7 ML/MIN/1.73 M^2
EST. GFR  (AFRICAN AMERICAN): 7.8 ML/MIN/1.73 M^2
EST. GFR  (NON AFRICAN AMERICAN): 6.1 ML/MIN/1.73 M^2
EST. GFR  (NON AFRICAN AMERICAN): 6.7 ML/MIN/1.73 M^2
GLUCOSE SERPL-MCNC: 134 MG/DL (ref 70–110)
GLUCOSE SERPL-MCNC: 48 MG/DL (ref 70–110)
GLUCOSE SERPL-MCNC: 49 MG/DL (ref 70–110)
GLUCOSE SERPL-MCNC: 57 MG/DL (ref 70–110)
GLUCOSE SERPL-MCNC: 59 MG/DL (ref 70–110)
GLUCOSE SERPL-MCNC: 60 MG/DL (ref 70–110)
GLUCOSE SERPL-MCNC: 60 MG/DL (ref 70–110)
GLUCOSE SERPL-MCNC: 64 MG/DL (ref 70–110)
GLUCOSE SERPL-MCNC: 65 MG/DL (ref 70–110)
HCT VFR BLD AUTO: 26.6 % (ref 37–48.5)
HGB BLD-MCNC: 8.8 G/DL (ref 12–16)
IMM GRANULOCYTES # BLD AUTO: 0.01 K/UL (ref 0–0.04)
IMM GRANULOCYTES NFR BLD AUTO: 0.2 % (ref 0–0.5)
LACOSAMIDE SERPL-MCNC: 3.7 UG/ML (ref 5–10)
LEVETIRACETAM SERPL-MCNC: 32.1 UG/ML (ref 10–40)
LYMPHOCYTES # BLD AUTO: 0.7 K/UL (ref 1–4.8)
LYMPHOCYTES NFR BLD: 17.4 % (ref 18–48)
MCH RBC QN AUTO: 26.3 PG (ref 27–31)
MCHC RBC AUTO-ENTMCNC: 33.1 G/DL (ref 32–36)
MCV RBC AUTO: 80 FL (ref 82–98)
MONOCYTES # BLD AUTO: 0.6 K/UL (ref 0.3–1)
MONOCYTES NFR BLD: 15.9 % (ref 4–15)
NEUTROPHILS # BLD AUTO: 2.6 K/UL (ref 1.8–7.7)
NEUTROPHILS NFR BLD: 64.6 % (ref 38–73)
NRBC BLD-RTO: 0 /100 WBC
PHOSPHATE SERPL-MCNC: 2.7 MG/DL (ref 2.7–4.5)
PLATELET # BLD AUTO: 123 K/UL (ref 150–350)
PMV BLD AUTO: ABNORMAL FL (ref 9.2–12.9)
POTASSIUM SERPL-SCNC: 3.6 MMOL/L (ref 3.5–5.1)
POTASSIUM SERPL-SCNC: 3.7 MMOL/L (ref 3.5–5.1)
PROT SERPL-MCNC: 6.1 G/DL (ref 6–8.4)
RBC # BLD AUTO: 3.34 M/UL (ref 4–5.4)
SODIUM SERPL-SCNC: 137 MMOL/L (ref 136–145)
SODIUM SERPL-SCNC: 138 MMOL/L (ref 136–145)
VALPROATE SERPL-MCNC: 14.5 UG/ML (ref 50–100)
WBC # BLD AUTO: 4.03 K/UL (ref 3.9–12.7)

## 2019-08-16 PROCEDURE — 82533 TOTAL CORTISOL: CPT

## 2019-08-16 PROCEDURE — 80164 ASSAY DIPROPYLACETIC ACD TOT: CPT

## 2019-08-16 PROCEDURE — 83525 ASSAY OF INSULIN: CPT

## 2019-08-16 PROCEDURE — 63600175 PHARM REV CODE 636 W HCPCS: Performed by: NURSE PRACTITIONER

## 2019-08-16 PROCEDURE — 25000003 PHARM REV CODE 250

## 2019-08-16 PROCEDURE — 63600175 PHARM REV CODE 636 W HCPCS: Performed by: PSYCHIATRY & NEUROLOGY

## 2019-08-16 PROCEDURE — C9254 INJECTION, LACOSAMIDE: HCPCS | Performed by: NURSE PRACTITIONER

## 2019-08-16 PROCEDURE — 84681 ASSAY OF C-PEPTIDE: CPT

## 2019-08-16 PROCEDURE — 25000003 PHARM REV CODE 250: Performed by: NURSE PRACTITIONER

## 2019-08-16 PROCEDURE — 94761 N-INVAS EAR/PLS OXIMETRY MLT: CPT

## 2019-08-16 PROCEDURE — 20000000 HC ICU ROOM

## 2019-08-16 PROCEDURE — 82947 ASSAY GLUCOSE BLOOD QUANT: CPT

## 2019-08-16 PROCEDURE — 25000003 PHARM REV CODE 250: Performed by: INTERNAL MEDICINE

## 2019-08-16 PROCEDURE — 80053 COMPREHEN METABOLIC PANEL: CPT

## 2019-08-16 PROCEDURE — 63600175 PHARM REV CODE 636 W HCPCS: Performed by: INTERNAL MEDICINE

## 2019-08-16 PROCEDURE — 36415 COLL VENOUS BLD VENIPUNCTURE: CPT

## 2019-08-16 PROCEDURE — 90935 HEMODIALYSIS ONE EVALUATION: CPT

## 2019-08-16 PROCEDURE — 80069 RENAL FUNCTION PANEL: CPT

## 2019-08-16 PROCEDURE — 85025 COMPLETE CBC W/AUTO DIFF WBC: CPT

## 2019-08-16 RX ORDER — DILTIAZEM HYDROCHLORIDE 30 MG/1
30 TABLET, FILM COATED ORAL EVERY 8 HOURS
Status: DISCONTINUED | OUTPATIENT
Start: 2019-08-16 | End: 2019-08-18

## 2019-08-16 RX ADMIN — HEPARIN SODIUM 5000 UNITS: 5000 INJECTION INTRAVENOUS; SUBCUTANEOUS at 02:08

## 2019-08-16 RX ADMIN — CHLORHEXIDINE GLUCONATE 15 ML: 1.2 RINSE ORAL at 01:08

## 2019-08-16 RX ADMIN — MUPIROCIN: 20 OINTMENT TOPICAL at 01:08

## 2019-08-16 RX ADMIN — SODIUM CHLORIDE 50 MG: 9 INJECTION, SOLUTION INTRAVENOUS at 03:08

## 2019-08-16 RX ADMIN — ASPIRIN 81 MG: 81 TABLET, COATED ORAL at 01:08

## 2019-08-16 RX ADMIN — SODIUM CHLORIDE 50 MG: 9 INJECTION, SOLUTION INTRAVENOUS at 02:08

## 2019-08-16 RX ADMIN — DEXTROSE MONOHYDRATE 500 MG: 50 INJECTION, SOLUTION INTRAVENOUS at 03:08

## 2019-08-16 RX ADMIN — SODIUM THIOSULFATE 12.5 G: 250 INJECTION, SOLUTION INTRAVENOUS at 11:08

## 2019-08-16 RX ADMIN — DEXTROSE MONOHYDRATE 250 MG: 50 INJECTION, SOLUTION INTRAVENOUS at 01:08

## 2019-08-16 RX ADMIN — DILTIAZEM HYDROCHLORIDE 30 MG: 30 TABLET, FILM COATED ORAL at 01:08

## 2019-08-16 RX ADMIN — PIPERACILLIN AND TAZOBACTAM 4.5 G: 4; .5 INJECTION, POWDER, LYOPHILIZED, FOR SOLUTION INTRAVENOUS; PARENTERAL at 09:08

## 2019-08-16 RX ADMIN — CHLORHEXIDINE GLUCONATE 15 ML: 1.2 RINSE ORAL at 09:08

## 2019-08-16 RX ADMIN — PIPERACILLIN AND TAZOBACTAM 4.5 G: 4; .5 INJECTION, POWDER, LYOPHILIZED, FOR SOLUTION INTRAVENOUS; PARENTERAL at 01:08

## 2019-08-16 RX ADMIN — LEVETIRACETAM 250 MG: 100 INJECTION, SOLUTION, CONCENTRATE INTRAVENOUS at 01:08

## 2019-08-16 RX ADMIN — DILTIAZEM HYDROCHLORIDE 30 MG: 30 TABLET, FILM COATED ORAL at 09:08

## 2019-08-16 NOTE — NURSING
Patient family at bedside. Patient irritable not cooperating with bath. Purpose explained patient more cooperative. POC explained to patient and family. Voiced understanding. Patient confused at times.

## 2019-08-16 NOTE — ASSESSMENT & PLAN NOTE
Will discontinue D20 and check insulin, C peptide and cortisol when glucose < 55  Cosyntropin Stimulation test

## 2019-08-16 NOTE — PT/OT/SLP PROGRESS
Physical Therapy      Patient Name:  Griselda Neely   MRN:  [id number]    Patient not seen today secondary to Other (Comment)(pt cont to be in ICU, not medically stable. Will dc PT orders. Please reconsult when appropriate.).    Jackie Toro, PT

## 2019-08-16 NOTE — CONSULTS
"[facility]  Consult Note    Patient Name: Griselda Neely  MRN: [id number]  Admission Date: 8/6/2019  Hospital Length of Stay: 9 days  Attending Physician: Amos Snyder MD   Primary Care Provider: Kalie Neely MD           Patient information was obtained from chart.     Consults  Subjective:     Principal Problem: Encephalopathy, toxic    Chief Complaint:   Chief Complaint   Patient presents with    Altered Mental Status        HPI: Griselda Neely is a 73 y.o. female with a history as below who presented to the ED from her cardiologist office via  for evaluation of altered mental status changes.  Sister at bedside reports patient has been in [facility] s/p discharge from hospital ~ 3 weeks ago, diagnosis unclear as family states "I know she was started on anti-seizure medications, Keppra and vimpat. Sister at bedside further states patient stopped eating and drinking on Friday.  Further reports patient with ESRD, had dialysis on Wednesday and Friday but was taken off early. Also reports went to HD on Monday.  Per chart review, patient hospitalized In July 2019 and developed acute encephalopathy during that admission, neurology was consulted with extensive work-up and non-acute findings. On admit, labs and imaging reviewed.  Neurology consulted with recs for MRI/MRA and EEG.  Admitted for acute encephalitis ?metabolic +/- uremia +/- infectious process  +/- hypoglycemia +/- medications.  Nephrology consulted for inpatient HD.         I have been asked to see the pt regarding hypoglycemia which has developed since admit. Pt has required D20 infusion to keep glucoses in nml range. Pt is minimally responsive to me - she moans when asked questions, but is not able to answer any questions.       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    both legs    CHF (congestive heart failure)     Encounter for blood transfusion 03/2016    " Gout     Hypertension     Mitral valve regurgitation     Osteoarthritis     Pancreatitis     Peripheral vascular disease     Peritoneal dialysis catheter in place     Pneumonia 09/09/2017    Renal failure        Past Surgical History:   Procedure Laterality Date    ACHILLES TENDON SURGERY Right     APPENDECTOMY      CARDIAC CATHETERIZATION  07/03/2017    CHOLECYSTECTOMY      heal surgery Right     HYSTERECTOMY      INSERTION-PERITONEAL DIALYSIS CATHETER-LAPAROSCOPIC N/A 4/26/2016    Performed [facility] at [facility]    PARATHYROIDECTOMY      PARATHYROIDECTOMY  07/13/2017    PARATHYROIDECTOMY N/A 7/13/2017    Performed by Jhoan Willoughby MD at [facility] OR    PERITONEAL CATHETER INSERTION      REIMPLANTATION-PARATHYROID TISSUE N/A 7/13/2017    Performed by Jhoan Willoughby MD at [facility] OR    REMOVAL-CATHETER-DIALYSIS-PERITONEAL N/A 9/19/2017    Performed by Jhoan Willoughby MD at [facility] OR    RENAL BIOPSY      vocal cord nodule         Review of patient's allergies indicates:  No Known Allergies    No current facility-administered medications on file prior to encounter.      Current Outpatient Medications on File Prior to Encounter   Medication Sig    aspirin (ECOTRIN) 81 MG EC tablet Take 81 mg by mouth once daily.    fluticasone furoate-vilanterol (BREO) 100-25 mcg/dose diskus inhaler Inhale 1 puff into the lungs once daily. Controller    isosorbide mononitrate (IMDUR) 30 MG 24 hr tablet Take 30 mg by mouth once daily.    levETIRAcetam (KEPPRA) 500 MG Tab Take 500 mg by mouth once daily.    losartan (COZAAR) 25 MG tablet Take 25 mg by mouth once daily.    sevelamer carbonate (RENVELA) 800 mg Tab Take 800 mg by mouth 3 (three) times daily with meals.    traMADol (ULTRAM) 50 mg tablet Take 50 mg by mouth every 6 (six) hours as needed for Pain.    ondansetron (ZOFRAN) 4 MG tablet Take 4 mg by mouth every 6 (six) hours as needed for Nausea.     Family History      Problem Relation (Age of Onset)    Arthritis Mother    Diabetes Mother, Father    Early death Sister, Sister    Heart disease Sister, Maternal Grandfather, Mother    Heart failure Mother    Hypertension Mother        Tobacco Use    Smoking status: Current Some Day Smoker     Packs/day: 0.50     Years: 45.00     Pack years: 22.50    Smokeless tobacco: Never Used   Substance and Sexual Activity    Alcohol use: No    Drug use: No    Sexual activity: Not on file     Review of Systems   Unable to perform ROS: Patient nonverbal     Objective:     Vital Signs (Most Recent):  Temp: 98 °F (36.7 °C) (08/15/19 1700)  Pulse: (!) 111 (08/15/19 1800)  Resp: 20 (08/15/19 1800)  BP: 111/60 (08/15/19 1800)  SpO2: (!) 89 % (08/15/19 1800) Vital Signs (24h Range):  Temp:  [97.8 °F (36.6 °C)-98.9 °F (37.2 °C)] 98 °F (36.7 °C)  Pulse:  [] 111  Resp:  [14-24] 20  SpO2:  [89 %-100 %] 89 %  BP: ()/() 111/60     Weight: 56.5 kg (124 lb 9 oz)  Body mass index is 20.73 kg/m².    Physical Exam   Neck:   No palp thyromegaly   Cardiovascular:   irreg irreg   Pulmonary/Chest: Breath sounds normal.   Abdominal: Soft.   No organomegaly or masses    Musculoskeletal:   No edema   Neurological:   Minimally responsive - does not answer when asked questions       Significant Labs:   A1C:   Recent Labs   Lab 08/07/19  0216   HGBA1C 5.2     ABGs: No results for input(s): PH, PCO2, HCO3, POCSATURATED, BE, TOTALHB, COHB, METHB, O2HB, POCFIO2 in the last 48 hours.  CBC:   Recent Labs   Lab 08/13/19  1930 08/14/19  0300 08/15/19  0400   WBC 3.38* 3.23* 3.57*   HGB 9.9* 9.7* 8.9*   HCT 29.2* 28.5* 26.9*   * 122* 117*     CMP:   Recent Labs   Lab 08/14/19  0300 08/15/19  0400    137   K 3.2* 3.1*   CL 93* 95   CO2 23 21*    88   BUN 13 7*   CREATININE 6.2* 4.7*   CALCIUM 8.5* 8.4*   ANIONGAP 20* 21*   EGFRNONAA 6.2* 8.7*       Significant Imaging: I have reviewed all pertinent imaging results/findings within the past  24 hours.    Assessment/Plan:     Hypoglycemia, unspecified  Will discontinue D20 and check insulin, C peptide and cortisol when glucose < 55  Cosyntropin Stimulation test       Pneumonia due to infectious organism  On admit CXR shows persistence of predominantly ground-glass type pulmonary opacities in the right mid and lower lung zone, ?poss contributory to acute encephalitis  -Continue IV abx   -Blood cultures pending      ESRD (end stage renal disease)  Nephrology consulted for inpatient HD        VTE Risk Mitigation (From admission, onward)        Ordered     heparin (porcine) injection 5,000 Units  Every 8 hours      08/06/19 2001     Place ROSANNA hose  Until discontinued      08/06/19 2001     IP VTE HIGH RISK PATIENT  Once      08/06/19 2001              Thank you for your consult. I will follow-up with patient. Please contact us if you have any additional questions.    Chelsey Farrar MD  Department of Hospital Medicine   [facility]

## 2019-08-16 NOTE — PLAN OF CARE
08/16/19 0900   Patient Assessment/Suction   Level of Consciousness (AVPU) responds to voice   Respiratory Effort Normal   PRE-TX-O2   O2 Device (Oxygen Therapy) room air   Pulse Oximetry Type Continuous   $ Pulse Oximetry - Multiple Charge Pulse Oximetry - Multiple   Pulse 105   Resp 19   BP (!) 166/75

## 2019-08-16 NOTE — PROGRESS NOTES
[facility]  Neurology  Progress Note    Patient Name: Griselda Neely  MRN: [id number]  Admission Date: 8/6/2019  Hospital Length of Stay: 10 days  Code Status: Full Code   Attending Provider: Amos Snyder MD  Primary Care Physician: Kalie Neely MD   Principal Problem:Encephalopathy, toxic    Subjective:     Interval History: Patient seen and examined, she is more awake and alert. Receiving dialysis today.     Current Neurological Medications: MAR reviewed    HPI: Griselda Neely is a 73 y.o. female with a history as below who presented to the ED from her cardiologist office via  for evaluation of altered mental status changes.  Sister at bedside reports patient has been in [facility] s/p discharge from hospital ~ 3 weeks ago due to encephalopathy. Pt was started on Keppra and Vimpat during that admission. Sister at bedside further states patient stopped eating and drinking on Friday. Further reports patient with ESRD, had dialysis on Wednesday and Friday but was taken off early. Also reports went to HD on Monday.  Per chart review, patient hospitalized In July 2019 and developed acute encephalopathy during that admission, neurology was consulted with extensive work-up and non-acute findings. On admit, labs and imaging reviewed.  Neurology consulted with recs for MRI/MRA and EEG.     CRT: 6.7  K: 3.4  LDL: 55.2  Ammonia: 18  B12: 1045  TSH: 2.7  Vimpat 3.7 (5-10)  Keppra P  Depakote P     Brain imaging:  CT head 8/12/19: 1. No acute intracranial abnormality.  2. Chronic small vessel ischemic changes.     CT head:   No acute intracranial abnormality.        EEG: This is an abnormal awake and drowsy routine EEG due to diffuse slowing of the background activity consistent with a mild to moderate encephalopathy.     EEG 8/14/19: Moderate encephalopathy. No Seizures. Improved from previous study.       Current Facility-Administered Medications   Medication Dose Route Frequency Provider  Last Rate Last Dose    0.9%  NaCl infusion   Intravenous PRN Dane Martinez MD        aspirin EC tablet 81 mg  81 mg Oral Daily TODD Rush   81 mg at 08/15/19 0835    chlorhexidine 0.12 % solution 15 mL  15 mL Mouth/Throat BID Lizett Mann PharmD   15 mL at 08/15/19 2056    dextrose 50% injection 12.5 g  12.5 g Intravenous PRN TODD Rush   12.5 g at 08/13/19 1743    dicyclomine capsule 10 mg  10 mg Oral QID PRN Hilary La MD        diltiaZEM 125 mg in D5W 125 mL infusion  5 mg/hr Intravenous Continuous Katharina Powell MD 5 mL/hr at 08/15/19 0207 5 mg/hr at 08/15/19 0207    epoetin alfonzo-epbx injection 2,800 Units  50 Units/kg Intravenous Every Mon, Wed, Fri Chalino Victoria MD   2,800 Units at 08/14/19 1256    glucagon (human recombinant) injection 1 mg  1 mg Intramuscular PRN TODD Rush        heparin (porcine) injection 5,000 Units  5,000 Units Subcutaneous Q8H TODD Rush   5,000 Units at 08/16/19 0210    insulin aspart U-100 pen 0-5 Units  0-5 Units Subcutaneous Q6H PRN TODD Rush        lacosamide (VIMPAT) 50 mg in sodium chloride 0.9% 100 mL IVPB  50 mg Intravenous Q12H Maggie Blood  mL/hr at 08/16/19 0211 50 mg at 08/16/19 0211    levETIRAcetam (KEPPRA) 250 mg in dextrose 5 % 100 mL IVPB  250 mg Intravenous Daily Jef Florentino  mL/hr at 08/15/19 1000 250 mg at 08/15/19 1000    levETIRAcetam (KEPPRA) 500 mg in dextrose 5 % 100 mL IVPB  500 mg Intravenous Daily PRN Maggie Blood NP        metoprolol injection 5 mg  5 mg Intravenous Q6H PRN Daren Lea MD   5 mg at 08/12/19 1658    mupirocin 2 % ointment   Nasal BID Lizett Mann PharmD        norepinephrine 4 mg in dextrose 5 % 250 mL infusion  0.02 mcg/kg/min Intravenous Continuous Daren Lea MD   Stopped at 08/12/19 2000    ondansetron injection 4 mg  4 mg Intravenous Q4H PRN Hilary La MD   4 mg at 08/15/19 8142     piperacillin-tazobactam (ZOSYN) 0.75 g in dextrose 5 % 100 mL  0.75 g Intravenous PRN Daren Lea MD        piperacillin-tazobactam 4.5 g in dextrose 5 % 100 mL IVPB (ready to mix system)  4.5 g Intravenous Q12H Daren Lea MD 25 mL/hr at 08/15/19 2056 4.5 g at 08/15/19 2056    sodium chloride 0.9% flush 10 mL  10 mL Intravenous PRN TODD Rush        sodium thiosulfate 12.5 gram/50 mL (250 mg/mL) injection 12.5 g  12.5 g Intravenous Every Mon, Wed, Fri Thomas John Martinez MD   12.5 g at 08/14/19 1320    valproate (DEPACON) 250 mg in dextrose 5 % 50 mL IVPB  250 mg Intravenous Q24H Maggie Blood, NP 50 mL/hr at 08/15/19 1343 250 mg at 08/15/19 1343    valproate (DEPACON) 500 mg in dextrose 5 % 50 mL IVPB  500 mg Intravenous Q24H Maggie Blood, NP 50 mL/hr at 08/16/19 0304 500 mg at 08/16/19 0304       Review of Systems as per previous  Objective:     Vital Signs (Most Recent):  Temp: 98.1 °F (36.7 °C) (08/16/19 0800)  Pulse: 105 (08/16/19 0900)  Resp: 19 (08/16/19 0900)  BP: (!) 166/75 (08/16/19 0900)  SpO2: (!) 94 % (08/16/19 0801) Vital Signs (24h Range):  Temp:  [97.8 °F (36.6 °C)-98.7 °F (37.1 °C)] 98.1 °F (36.7 °C)  Pulse:  [] 105  Resp:  [15-25] 19  SpO2:  [88 %-100 %] 94 %  BP: ()/() 166/75     Weight: 56.5 kg (124 lb 9 oz)  Body mass index is 20.73 kg/m².    Physical Exam   Constitutional: She appears well-developed and well-nourished.   HENT:   Head: Normocephalic and atraumatic.   Eyes: Pupils are equal, round, and reactive to light. EOM are normal.   Neck: Normal range of motion. Neck supple.   Cardiovascular: Normal rate, regular rhythm and normal heart sounds.   Pulmonary/Chest: Effort normal and breath sounds normal.   Abdominal: Soft.   Musculoskeletal: Normal range of motion.   Neurological: She is alert. She has normal strength.   Skin: Skin is warm and dry.       NEUROLOGICAL EXAMINATION:     MENTAL STATUS   Oriented to person.   Oriented to place.    Disoriented to time.   Level of consciousness: alert       Follows commands  Able to answer in complete sentences  Marked improvement     CRANIAL NERVES   Cranial nerves II through XII intact.     CN III, IV, VI   Pupils are equal, round, and reactive to light.  Extraocular motions are normal.     MOTOR EXAM     Strength   Strength 5/5 throughout.     SENSORY EXAM   Light touch normal.       Significant Labs: All pertinent lab results from the past 24 hours have been reviewed.        Assessment and Plan:   Encephalopathy  -Likely metabolic, multifactorial including dehydration, infection, and hypokalemia  -EEG: improving  -B12, TSH, Folate, Ammonia WNL  -Previous MRI brain done July 2019 negative acute, shows remote ischemia      History of seizures  -Abnormal EEGs on previous admit and pt started on Vimpat and Keppra  -EEG: no seizures  -Increase keppra to 750mg daily, given an additional 500mg after dialysis  -Continue Vimpat 50mg daily  -Continue depakote 250mg in the am and 500mg in the pm        ESRD  -Nephrology following      Pneumonia   -Management per IM     Hypoglycemia  - defer to HM and endocrine     Seizure education.    No driving for now, no swimming alone, no climbing high areas, no operation of heavy machinery or working with high risk electricity equipment.  Continue to take AEDs as prescribed. If any major side effects from neurological medications go to the ED including mood changes and severe depression.  Patient and/or next of kin informed.  Follow up Neurology in 2 weeks at [phone].  Medication side effects discussed with the patient and/or caregiver.  Active Diagnoses:    Diagnosis Date Noted POA    Hypoglycemia, unspecified [E16.2] 08/15/2019 Unknown    Altered mental status [R41.82] 08/12/2019 Yes    Encephalomalacia on imaging study [G93.89] 08/06/2019 Yes    Pneumonia due to infectious organism [J18.9] 08/06/2019 Yes    ESRD (end stage renal disease) [N18.6] 05/11/2016 Yes       Problems Resolved During this Admission:    Diagnosis Date Noted Date Resolved POA    PRINCIPAL PROBLEM:  Encephalopathy, metabolic  [G92] 08/06/2019 08/09/2019 Yes    Disorientation [R41.0] 08/06/2019 08/09/2019 Yes    Hypoglycemia [E16.2] 08/06/2019 08/09/2019 Yes    Elevated lactic acid level [R79.89] 08/06/2019 08/09/2019 Yes    Altered mental status [R41.82] 08/06/2019 08/09/2019 Yes    Atrial fibrillation with RVR [I48.91] 07/18/2017 08/09/2019 Yes       VTE Risk Mitigation (From admission, onward)        Ordered     heparin (porcine) injection 5,000 Units  Every 8 hours      08/06/19 2001     Place ROSANNA hose  Until discontinued      08/06/19 2001     IP VTE HIGH RISK PATIENT  Once      08/06/19 2001          Maggie Blood, KANE  Neurology  [facility]    I, Dr. Ramses Florentino, have personally seen and examined the patient with my advanced provider and agree with above. I personally did a focused exam, and reviewed all necessary clinical information. I discussed my management plan with my NP and agree with above.

## 2019-08-16 NOTE — PROGRESS NOTES
[facility] Medicine  Progress Note    Patient Name: Griselda Neely  MRN: [id number]  Patient Class: IP- Inpatient   Admission Date: 8/6/2019  Length of Stay: 10 days  Attending Physician: Amos Snyder MD  Primary Care Provider: Kalie Neely MD        Subjective:     Principal Problem:Encephalopathy, toxic    Interval History:  Seen and examined, she is awake alert oriented almost x3, she knows she is in [facility] in knows the month, knows the year, however does not know the president.    Review of Systems  Objective:     Vital Signs (Most Recent):  Temp: 98 °F (36.7 °C) (08/16/19 1600)  Pulse: 106 (08/16/19 1600)  Resp: 17 (08/16/19 1600)  BP: (!) 106/46 (08/16/19 1600)  SpO2: (!) 75 % (08/16/19 1130) Vital Signs (24h Range):  Temp:  [97.8 °F (36.6 °C)-98.7 °F (37.1 °C)] 98 °F (36.7 °C)  Pulse:  [] 106  Resp:  [13-42] 17  SpO2:  [75 %-99 %] 75 %  BP: ()/(35-88) 106/46     Weight: 56.5 kg (124 lb 9 oz)  Body mass index is 20.73 kg/m².    Intake/Output Summary (Last 24 hours) at 8/16/2019 1729  Last data filed at 8/16/2019 1230  Gross per 24 hour   Intake 550 ml   Output 900 ml   Net -350 ml      Physical Exam  General, no acute distress, awake alert oriented x3 almost  Lungs, clear to auscultation bilaterally  Heart regular rhythm and rate,  Abdomen soft, nontender nondistended  Extremities, she does have a decubital ulcer post TUR right thigh, so decubitus ulcer sacral      Significant Labs:   BMP:   Recent Labs   Lab 08/15/19  0400  08/16/19  0822 08/16/19  1355   GLU 88   < > 57* 48*      < > 137  --    K 3.1*   < > 3.6  --    CL 95   < > 95  --    CO2 21*   < > 19*  --    BUN 7*   < > 11  --    CREATININE 4.7*   < > 5.8*  --    CALCIUM 8.4*   < > 7.9*  --    MG 2.0  --   --   --     < > = values in this interval not displayed.     CBC:   Recent Labs   Lab 08/15/19  0400 08/16/19  0822   WBC 3.57* 4.03   HGB 8.9* 8.8*   HCT 26.9* 26.6*   *  123*         Assessment/Plan:      Active Diagnoses:    Diagnosis Date Noted POA    Hypoglycemia, unspecified [E16.2] 08/15/2019 Unknown    Altered mental status [R41.82] 08/12/2019 Yes    Encephalomalacia on imaging study [G93.89] 08/06/2019 Yes    Pneumonia due to infectious organism [J18.9] 08/06/2019 Yes    ESRD (end stage renal disease) [N18.6] 05/11/2016 Yes      Problems Resolved During this Admission:    Diagnosis Date Noted Date Resolved POA    PRINCIPAL PROBLEM:  Encephalopathy, metabolic  [G92] 08/06/2019 08/09/2019 Yes    Disorientation [R41.0] 08/06/2019 08/09/2019 Yes    Hypoglycemia [E16.2] 08/06/2019 08/09/2019 Yes    Elevated lactic acid level [R79.89] 08/06/2019 08/09/2019 Yes    Altered mental status [R41.82] 08/06/2019 08/09/2019 Yes    Atrial fibrillation with RVR [I48.91] 07/18/2017 08/09/2019 Yes     VTE Risk Mitigation (From admission, onward)        Ordered     heparin (porcine) injection 5,000 Units  Every 8 hours      08/06/19 2001     Place ROSANNA hose  Until discontinued      08/06/19 2001     IP VTE HIGH RISK PATIENT  Once      08/06/19 2001        Encephalopathy, metabolic   AMS  - waxing and waning, today she is likely at the best state she has leonid so far, close to her baseline, probably  This is likely multifactorial - likely metabolic +/- uremia +/- infectious process  +/- hypoglycemia +/- medications induced. - continue antibiotics will broaden coverage given worsening clinical status, re-culture  - neurology following -   - continue keppra. Vimpat redosed per neurology.   - move to icu for closer monitoring and continuous EEG per Dr. Ramses Florentino, need to r/o non-convulsive status given hx of seizure d/o   - if she continues to improve, possible transfer tomorrow to telemetry     Hypoglycemia  - poor po intake due to AMS  - hold D10 at 50 cc/hr, as per endocrinology frequent accuchecks   - continue to hold insulin, hold any po antihypoglycemics   - endocrinology consulted  appreciate input     Failure to thrive  - poor prognosis, if she does not start eating  - family informed about GT, they will consider it; however, today she started to eat     Diabetes  - ISSS, continue hold PO meds     Pneumonia due to infectious organism  On admit CXR showed persistence of predominantly ground-glass type pulmonary opacities in the right mid and lower lung zone,   -Continue zosyn     HTN,   - continue to  hold po agents for now with labile pressures      Atrial fibrillation with RVR  Patient with PAF on admit telemetry showed a-fib rvr, started on cardizem gtt in ED  -Continuous cardiac monitoring   -cardizem gtt if bp will tolerate     ESRD (end stage renal disease)  Nephrology consulted for inpatient HD         Amos Snyder MD  Department of Hospital Medicine   [facility]

## 2019-08-16 NOTE — SUBJECTIVE & OBJECTIVE
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    both legs    CHF (congestive heart failure)     Encounter for blood transfusion 03/2016    Gout     Hypertension     Mitral valve regurgitation     Osteoarthritis     Pancreatitis     Peripheral vascular disease     Peritoneal dialysis catheter in place     Pneumonia 09/09/2017    Renal failure        Past Surgical History:   Procedure Laterality Date    ACHILLES TENDON SURGERY Right     APPENDECTOMY      CARDIAC CATHETERIZATION  07/03/2017    CHOLECYSTECTOMY      heal surgery Right     HYSTERECTOMY      INSERTION-PERITONEAL DIALYSIS CATHETER-LAPAROSCOPIC N/A 4/26/2016    [facility]formed [facility]    PARATHYROIDECTOMY      PARATHYROIDECTOMY  07/13/2017    PARATHYROIDECTOMY N/A 7/13/2017    Performed by J[facility]an Willoughby [facility] at [facility] OR    PERITONEAL CATHETER INSERTION      REIMPLANTATION-PARATHYROID TISSUE N/A 7/13/2017    Performed by [facility]oughby [facility] at [facility] OR    REMOVAL-CATHETER-DIALYSIS-PERITONEAL N/A 9/19/2017    Performed by [facility]oughby [facility] at [facility] OR    RENAL BIOPSY      vocal cord nodule         Review of patient's allergies indicates:  No Known Allergies    No current facility-administered medications on file prior to encounter.      Current Outpatient Medications on File Prior to Encounter   Medication Sig    aspirin (ECOTRIN) 81 MG EC tablet Take 81 mg by mouth once daily.    fluticasone furoate-vilanterol (BREO) 100-25 mcg/dose diskus inhaler Inhale 1 puff into the lungs once daily. Controller    isosorbide mononitrate (IMDUR) 30 MG 24 hr tablet Take 30 mg by mouth once daily.    levETIRAcetam (KEPPRA) 500 MG Tab Take 500 mg by mouth once daily.    losartan (COZAAR) 25 MG tablet Take 25 mg by mouth once daily.    sevelamer carbonate (RENVELA) 800 mg Tab Take 800 mg by mouth 3 (three) times daily with meals.    traMADol (ULTRAM) 50 mg tablet Take 50 mg  by mouth every 6 (six) hours as needed for Pain.    ondansetron (ZOFRAN) 4 MG tablet Take 4 mg by mouth every 6 (six) hours as needed for Nausea.     Family History     Problem Relation (Age of Onset)    Arthritis Mother    Diabetes Mother, Father    Early death Sister, Sister    Heart disease Sister, Maternal Grandfather, Mother    Heart failure Mother    Hypertension Mother        Tobacco Use    Smoking status: Current Some Day Smoker     Packs/day: 0.50     Years: 45.00     Pack years: 22.50    Smokeless tobacco: Never Used   Substance and Sexual Activity    Alcohol use: No    Drug use: No    Sexual activity: Not on file     Review of Systems   Unable to perform ROS: Patient nonverbal     Objective:     Vital Signs (Most Recent):  Temp: 98 °F (36.7 °C) (08/15/19 1700)  Pulse: (!) 111 (08/15/19 1800)  Resp: 20 (08/15/19 1800)  BP: 111/60 (08/15/19 1800)  SpO2: (!) 89 % (08/15/19 1800) Vital Signs (24h Range):  Temp:  [97.8 °F (36.6 °C)-98.9 °F (37.2 °C)] 98 °F (36.7 °C)  Pulse:  [] 111  Resp:  [14-24] 20  SpO2:  [89 %-100 %] 89 %  BP: ()/() 111/60     Weight: 56.5 kg (124 lb 9 oz)  Body mass index is 20.73 kg/m².    Physical Exam   Neck:   No palp thyromegaly   Cardiovascular:   irreg irreg   Pulmonary/Chest: Breath sounds normal.   Abdominal: Soft.   No organomegaly or masses    Musculoskeletal:   No edema   Neurological:   Minimally responsive - does not answer when asked questions       Significant Labs:   A1C:   Recent Labs   Lab 08/07/19  0216   HGBA1C 5.2     ABGs: No results for input(s): PH, PCO2, HCO3, POCSATURATED, BE, TOTALHB, COHB, METHB, O2HB, POCFIO2 in the last 48 hours.  CBC:   Recent Labs   Lab 08/13/19  1930 08/14/19  0300 08/15/19  0400   WBC 3.38* 3.23* 3.57*   HGB 9.9* 9.7* 8.9*   HCT 29.2* 28.5* 26.9*   * 122* 117*     CMP:   Recent Labs   Lab 08/14/19  0300 08/15/19  0400    137   K 3.2* 3.1*   CL 93* 95   CO2 23 21*    88   BUN 13 7*   CREATININE  6.2* 4.7*   CALCIUM 8.5* 8.4*   ANIONGAP 20* 21*   EGFRNONAA 6.2* 8.7*       Significant Imaging: I have reviewed all pertinent imaging results/findings within the past 24 hours.

## 2019-08-16 NOTE — PLAN OF CARE
Problem: Adult Inpatient Plan of Care  Goal: Plan of Care Review  Outcome: Ongoing (interventions implemented as appropriate)     08/16/19 1620   Plan of Care Review   Plan of Care Reviewed With patient   Progress improving       Comments: Pt is currently not receiving any infusions. Able to draw labs for endocrinology. Awake and alert, oriented x 3. In good spirits. GR stable, remains in Afib. PO intake continues to improve. Ate an entire chicken leg. Encouraged family to bring food that patient would like to eat.

## 2019-08-16 NOTE — PROGRESS NOTES
"[facility]  Adult Nutrition  Progress Note    SUMMARY       Recommendations    Recommendation/Intervention: 1. If PO intake remains at 0% recommend starting Enteral Nutrition 2. Encourage meal and supplement intake   Goals: 1. Pt will meet 100% of estimated protein and energy needs 2. Pt will meet >50% of estimated energy and protein needs   Nutrition Goal Status: new  Communication of RD Recs: reviewed with RN    Reason for Assessment    Reason For Assessment: RD follow-up  Diagnosis: renal disease  Relevant Medical History: ESRD on HD, encephalopathy   Interdisciplinary Rounds: attended    Nutrition Risk Screen    Nutrition Risk Screen: no indicators present    Nutrition/Diet History    Patient Reported Diet/Restrictions/Preferences: heart healthy  Typical Food/Fluid Intake: regular diet, very poor PO intake  Food Preferences: frosted flakes and whole milk  Spiritual, Cultural Beliefs, Gnosticism Practices, Values that Affect Care: no  Supplemental Drinks or Food Habits: Ensure Enlive  Food Allergies: NKFA  Factors Affecting Nutritional Intake: decreased appetite    Anthropometrics    Temp: 97.8 °F (36.6 °C)  Height Method: Stated  Height: 5' 5" (165.1 cm)  Height (inches): 65 in  Weight Method: Bed Scale  Weight: 56.5 kg (124 lb 9 oz)  Weight (lb): 124.56 lb  Ideal Body Weight (IBW), Female: 125 lb  % Ideal Body Weight, Female (lb): 89.59 lb  % Ideal Body Weight Malnutrition: 80-90% - mild deficit  BMI (Calculated): 18.7  BMI Grade: 18.5-24.9 - normal  Weight Loss: unintentional  Usual Body Weight (UBW), k kg  Weight Change Amount: 20 lb(Severe weight loss; >5% in <1 month)  % Usual Body Weight: 84.84  % Weight Change From Usual Weight: -15.33 %       Lab/Procedures/Meds    Pertinent Labs Reviewed: reviewed   2019 08:22   Sodium 137   Potassium 3.6   Chloride 95   CO2 19 (L)   Anion Gap 23 (H)   BUN, Bld 11   Creatinine 5.8 (H)   eGFR if non  6.7 (A)   eGFR if  " American 7.8 (A)   Glucose 57 (L)   Calcium 7.9 (L)   Alkaline Phosphatase 56   PROTEIN TOTAL 6.1   Albumin 2.5 (L)   BILIRUBIN TOTAL 0.9   AST 12   ALT 7 (L)     Pertinent Medications Reviewed: reviewed  Scheduled Meds:   aspirin  81 mg Oral Daily    chlorhexidine  15 mL Mouth/Throat BID    epoetin alfonzo-ebpx (RETACRIT) injection  50 Units/kg Intravenous Every Mon, Wed, Fri    heparin (porcine)  5,000 Units Subcutaneous Q8H    lacosamide (VIMPAT) IVPB  50 mg Intravenous Q12H    levetiracetam IVPB  250 mg Intravenous Daily    mupirocin   Nasal BID    piperacillin-tazobactam (ZOSYN) IVPB  4.5 g Intravenous Q12H    sodium thiosulfate  12.5 g Intravenous Every Mon, Wed, Fri    valproate sodium (DEPACON) IVPB  250 mg Intravenous Q24H    valproate sodium (DEPACON) IVPB  500 mg Intravenous Q24H     Estimated/Assessed Needs    Weight Used For Calorie Calculations: 50.3 kg (111 lb)  Energy Calorie Requirements (kcal): 1856-1050 Kcal/day  Energy Need Method: Kcal/kg  Protein Requirements:  g/day  Weight Used For Protein Calculations: 50.3 kg (111 lb)  Fluid Requirements (mL): 1000 ml + UOP /day or per MD      RDA Method (mL): 1510     Nutrition Prescription Ordered    Current Diet Order: Cardiac Diet   Oral Nutrition Supplement: Ensure Enlive Vanilla Milkshake    Evaluation of Received Nutrient/Fluid Intake    Energy Calories Required: not meeting needs  Protein Required: not meeting needs  Fluid Required: not meeting needs  Comments: Patient has not  Tolerance: not tolerating  % Intake of Estimated Energy Needs: 0 - 25 %  % Meal Intake: 0 - 25 %    Nutrition Risk    Level of Risk/Frequency of Follow-up: high     Assessment and Plan    No new Assessment & Plan notes have been filed under this hospital service since the last note was generated.  Service: Nutrition       Monitor and Evaluation    Food and Nutrient Intake: energy intake, food and beverage intake  Food and Nutrient Adminstration: diet  order  Physical Activity and Function: nutrition-related ADLs and IADLs  Anthropometric Measurements: weight, weight change, body mass index  Biochemical Data, Medical Tests and Procedures: electrolyte and renal panel, gastrointestinal profile, glucose/endocrine profile, inflammatory profile, lipid profile  Nutrition-Focused Physical Findings: overall appearance     Malnutrition Assessment  Malnutrition Type: chronic illness, acute illness or injury  Energy Intake: other (see comments)(<50% of estimated energy intake for >5days)          Weight Loss (Malnutrition): greater than 5% in 1 month  Energy Intake (Malnutrition): less than or equal to 50% for greater than or equal to 5 days  Subcutaneous Fat (Malnutrition): severe depletion  Muscle Mass (Malnutrition): severe depletion   Orbital Region (Subcutaneous Fat Loss): severe depletion   Shinto Region (Muscle Loss): moderate depletion  Clavicle Bone Region (Muscle Loss): severe depletion  Clavicle and Acromion Bone Region (Muscle Loss): severe depletion       Subcutaneous Fat Loss (Final Summary): severe protein-calorie malnutrition  Muscle Loss Evaluation (Final Summary): severe protein-calorie malnutrition    Severe Weight Loss (Malnutrition): greater than 5% in 1 month    Nutrition Follow-Up    RD Follow-up?: Yes   Chelo Asif  08/16/2019  12:37 PM

## 2019-08-16 NOTE — PROGRESS NOTES
[facility]  Nephrology  Progress Note    Patient Name: Griselda Neely  MRN: [id number]  Admission Date: 8/6/2019  Hospital Length of Stay: 10 days  Attending Provider: Amos Snyder MD   Primary Care Physician: Kalie Neely MD  Principal Problem:Encephalopathy, toxic      Subjective:     Interval History: Alert but more confused & restless overnight; remains minimally verbal; no obvious distress appreciated    Review of patient's allergies indicates:  No Known Allergies  Current Facility-Administered Medications   Medication Frequency    0.9%  NaCl infusion PRN    aspirin EC tablet 81 mg Daily    chlorhexidine 0.12 % solution 15 mL BID    dextrose 50% injection 12.5 g PRN    dicyclomine capsule 10 mg QID PRN    diltiaZEM 125 mg in D5W 125 mL infusion Continuous    epoetin alfonzo-epbx injection 2,800 Units Every Mon, Wed, Fri    glucagon (human recombinant) injection 1 mg PRN    heparin (porcine) injection 5,000 Units Q8H    insulin aspart U-100 pen 0-5 Units Q6H PRN    lacosamide (VIMPAT) 50 mg in sodium chloride 0.9% 100 mL IVPB Q12H    levETIRAcetam (KEPPRA) 250 mg in dextrose 5 % 100 mL IVPB Daily    levETIRAcetam (KEPPRA) 500 mg in dextrose 5 % 100 mL IVPB Daily PRN    metoprolol injection 5 mg Q6H PRN    mupirocin 2 % ointment BID    norepinephrine 4 mg in dextrose 5 % 250 mL infusion Continuous    ondansetron injection 4 mg Q4H PRN    piperacillin-tazobactam (ZOSYN) 0.75 g in dextrose 5 % 100 mL PRN    piperacillin-tazobactam 4.5 g in dextrose 5 % 100 mL IVPB (ready to mix system) Q12H    sodium chloride 0.9% flush 10 mL PRN    sodium thiosulfate 12.5 gram/50 mL (250 mg/mL) injection 12.5 g Every Mon, Wed, Fri    valproate (DEPACON) 250 mg in dextrose 5 % 50 mL IVPB Q24H    valproate (DEPACON) 500 mg in dextrose 5 % 50 mL IVPB Q24H       Objective:     Vital Signs (Most Recent):  Temp: 98.2 °F (36.8 °C) (08/16/19 0301)  Pulse: 92 (08/16/19 0506)  Resp: 19 (08/16/19  0506)  BP: (!) 104/59 (08/16/19 0506)  SpO2: (!) 94 % (08/16/19 0506)  O2 Device (Oxygen Therapy): room air (08/15/19 2009) Vital Signs (24h Range):  Temp:  [97.8 °F (36.6 °C)-98.9 °F (37.2 °C)] 98.2 °F (36.8 °C)  Pulse:  [] 92  Resp:  [15-25] 19  SpO2:  [88 %-100 %] 94 %  BP: ()/() 104/59     Weight: 56.5 kg (124 lb 9 oz) (08/15/19 0400)  Body mass index is 20.73 kg/m².  Body surface area is 1.61 meters squared.    I/O last 3 completed shifts:  In: 2500 [I.V.:1150; Other:500; IV Piggyback:850]  Out: 2500 [Other:2500]    Physical Exam   Constitutional: No distress.   Chronically ill & somewhat malnourished-appearing   HENT:   Head: Normocephalic and atraumatic.   Mouth/Throat: Oropharynx is clear and moist.   Eyes: Conjunctivae are normal. Right eye exhibits no discharge. Left eye exhibits no discharge. No scleral icterus.   Neck: Normal range of motion. Neck supple. No JVD present.   Cardiovascular: Normal rate. Exam reveals no gallop and no friction rub.   Murmur heard.  Irregularly irregular rhythm   Pulmonary/Chest: Effort normal and breath sounds normal. No respiratory distress. She has no rales.   Abdominal: Soft. She exhibits no distension and no mass. There is no tenderness.   Hypoactive BS in all quadrants   Genitourinary:   Genitourinary Comments: Deferred   Musculoskeletal: Normal range of motion. She exhibits no edema, tenderness or deformity.   R UE AV Fistula w/ palpable thrill   Neurological: She is alert.   Skin: Skin is warm and dry. She is not diaphoretic.   B LE Ulcers (Calciphylaxis)   Psychiatric:   Unable to assess   Nursing note and vitals reviewed.      Significant Labs:sure  CBC:   Recent Labs   Lab 08/15/19  0400   WBC 3.57*   RBC 3.41*   HGB 8.9*   HCT 26.9*   *   MCV 79*   MCH 26.1*   MCHC 33.1     CMP:   Recent Labs   Lab 08/13/19  0303  08/15/19  0400      < > 88   CALCIUM 7.8*   < > 8.4*   ALBUMIN 2.6*  --   --    PROT 6.0  --   --    *   < > 137    K 2.8*   < > 3.1*   CO2 22*   < > 21*   CL 97   < > 95   BUN 11   < > 7*   CREATININE 5.1*   < > 4.7*   ALKPHOS 49*  --   --    ALT 6*  --   --    AST 14  --   --    BILITOT 0.5  --   --     < > = values in this interval not displayed.       Significant Imaging:  X-Ray: Reviewed  CT: Reviewed    Assessment/Plan:     Active Diagnoses:    Diagnosis Date Noted POA    Hypoglycemia, unspecified [E16.2] 08/15/2019 Unknown    Altered mental status [R41.82] 08/12/2019 Yes    Encephalomalacia on imaging study [G93.89] 08/06/2019 Yes    Pneumonia due to infectious organism [J18.9] 08/06/2019 Yes    ESRD (end stage renal disease) [N18.6] 05/11/2016 Yes      Problems Resolved During this Admission:    Diagnosis Date Noted Date Resolved POA    PRINCIPAL PROBLEM:  Encephalopathy, metabolic  [G92] 08/06/2019 08/09/2019 Yes    Disorientation [R41.0] 08/06/2019 08/09/2019 Yes    Hypoglycemia [E16.2] 08/06/2019 08/09/2019 Yes    Elevated lactic acid level [R79.89] 08/06/2019 08/09/2019 Yes    Altered mental status [R41.82] 08/06/2019 08/09/2019 Yes    Atrial fibrillation with RVR [I48.91] 07/18/2017 08/09/2019 Yes     1 ESRD (HD-MWF via R UE AV Fistula)- clinically stable at present; no overt volume overload, uremic signs/symptoms, electrolyte perturbations nor acid-base disturbance; no active issues    -maintenance HD (duration: 3h; UF Goal: 2-3L as tolerated;  Standard 'bath'; Access: AVF; Qb: 400 ml/min, Qd: 2x BFR; 12.5 gm of Na thiosulfate over last 30-60 min of treatment) per outpatient schedule    -DAILY renal function panel to document sCr trend, optimize electrolyte 'bath' & assess response to therapy    -avoid nephrotoxic agents (NSAIDs, IV contrast dye, MRI w/ Gadolinium constrast)    -renally dose all appropriate medications, including antibiotics    2. HYPOKALEMIA (3.1)- clinically stable at present (s/p IV repletion); etiology unclear no active issues    -recommend KCl 'rider' (40 mEq in 0.45% NS to run  over 3-4h); 3K Bath during HD     3. HTN- clinically stable at present; BP NOT AT GOAL (161/90); no active issues    -continue current anti-HTN drug regimen (GIVE BP MEDS POST HD on HD Days) +/- HD-associated UF     4. ANEMIA- clinically stable at present; H/H AT GOAL (8.9/26.9); no active issues    -trend DAILY CBC (H/H) to effect optimal blood-loss surveillance     5. SECONDARY HYPERPARATHYROIDISM (sHPT)-- clinically stable on current therapy (Na thiosulfate 12.5 gm x last 30-60 min of HD); no active issues    -continue dietary binder/Vitamin D +/- HD-associated calcimimetic therapy (Cinacalcet vs Etelcalcetide)    6. ALTERED MENTAL STATUS- remains acutely confused & markedly restless this am; etiology likely multifactorial; Neurology service on the case    -management per Neurology/Primary team recommendations    Thank you for your consult. I will follow-up with patient. Please contact us if you have any additional questions.    Dane Martinez III, MD  [facility]  [facility]  [phone] (C)

## 2019-08-16 NOTE — PROGRESS NOTES
[facility]  Cardiology  Progress Note    Patient Name: Griselda Neely  MRN: [id number]  Admission Date: 8/6/2019  Hospital Length of Stay: 10 days  Code Status: Full Code   Attending Physician: Amos Snyder MD   Primary Care Physician: Kalie Neely MD  Expected Discharge Date:   Principal Problem:Encephalopathy, toxic    Subjective:       Interval History:   Patient seen and examined post dialysis. Left 350 mL positive. Patient is more alert today. Answering questions appropriately. Tolerated being off restraints.   AAOX3 (person, place, time). Confused to recent events.  Remains in atrial fibrillation with varying heart rates, as high as 150s at times, lower 100s presently. Per nursing, BP dropped while on Cardizem drip so this has been stopped. Patient states she is going to eat today.     ROS:  No significant headaches or sore throat or runny nose.   No recent changes in vision.   No recent changes in hearing.  No dysphagia or odynophagia.  Denies chest pain and shortness of breath at baseline.   Denies any cough or hemoptysis.   Denies any abdominal pain, nausea, vomiting, diarrhea or constipation.   Denies any dysuria or polyuria.   Denies any fevers or chills.   Denies any recent significant weight changes.   Denies bleeding diathesis       Cardiology:  Atrial fibrillation, varying rates. Cardizem drip off.   Objective:     Vital Signs (Most Recent):  Temp: 97.8 °F (36.6 °C) (08/16/19 0915)  Pulse: (!) 112 (08/16/19 1215)  Resp: 18 (08/16/19 1200)  BP: 101/80 (08/16/19 1215)  SpO2: (!) 75 % (08/16/19 1130) Vital Signs (24h Range):  Temp:  [97.8 °F (36.6 °C)-98.7 °F (37.1 °C)] 97.8 °F (36.6 °C)  Pulse:  [] 112  Resp:  [13-42] 18  SpO2:  [75 %-100 %] 75 %  BP: ()/() 101/80     Weight: 56.5 kg (124 lb 9 oz)  Body mass index is 20.73 kg/m².    SpO2: (!) 75 %  O2 Device (Oxygen Therapy): room air    No intake or output data in the 24 hours ending 08/16/19  1247    Lines/Drains/Airways     Central Venous Catheter Line                 Percutaneous Central Line Insertion/Assessment - triple lumen  08/12/19 1921 3 days          Drain                 Hemodialysis AV Fistula 08/08/19 0214 Right upper arm 8 days          Peripheral Intravenous Line                 Midline Catheter Insertion/Assessment  - Single Lumen 08/12/19 1700 Left median cubital vein (antecubital fossa) 3 days                Scheduled Meds:   aspirin  81 mg Oral Daily    chlorhexidine  15 mL Mouth/Throat BID    epoetin alfonzo-ebpx (RETACRIT) injection  50 Units/kg Intravenous Every Mon, Wed, Fri    heparin (porcine)  5,000 Units Subcutaneous Q8H    lacosamide (VIMPAT) IVPB  50 mg Intravenous Q12H    levetiracetam IVPB  250 mg Intravenous Daily    mupirocin   Nasal BID    piperacillin-tazobactam (ZOSYN) IVPB  4.5 g Intravenous Q12H    sodium thiosulfate  12.5 g Intravenous Every Mon, Wed, Fri    valproate sodium (DEPACON) IVPB  250 mg Intravenous Q24H    valproate sodium (DEPACON) IVPB  500 mg Intravenous Q24H     Continuous Infusions:   dilTIAZem 5 mg/hr (08/15/19 0207)    norepinephrine bitartrate-D5W Stopped (08/12/19 2000)     PRN Meds:.sodium chloride 0.9%, dextrose 50%, dicyclomine, glucagon (human recombinant), insulin aspart U-100, levetiracetam IVPB, metoprolol, ondansetron, piperacillin-tazobactam (ZOSYN) IVPB, sodium chloride 0.9%     Physical Exam     HEENT: Normocephalic, atraumatic, PERRL, Conjunctiva pink, no scleral icterus.   CVS: S1S2+, Irregular, + SM, rubs or gallops, JVP: Normal.  LUNGS: Clear, diminished in the bases.   ABDOMEN: Soft, NT, BS+  EXTREMITIES: No cyanosis, clubbing or edema. Left upper arm fistula + thrill + bruit. Left thigh calciphylaxis ulcer with dressing CDI.   NEURO: More alert today. Answering questions appropriately. AAOx3.       Significant Labs:   Blood Culture:   No results for input(s): LABBLOO in the last 48 hours., BMP:   Recent Labs   Lab  08/14/19  1423 08/15/19  0400 08/16/19  0746 08/16/19  0822   GLU  --  88 59* 57*   NA  --  137 138 137   K  --  3.1* 3.7 3.6   CL  --  95 96 95   CO2  --  21* 20* 19*   BUN  --  7* 12 11   CREATININE  --  4.7* 6.3* 5.8*   CALCIUM  --  8.4* 8.1* 7.9*   MG 1.8 2.0  --   --    , CMP   Recent Labs   Lab 08/15/19  0400 08/16/19  0746 08/16/19  0822    138 137   K 3.1* 3.7 3.6   CL 95 96 95   CO2 21* 20* 19*   GLU 88 59* 57*   BUN 7* 12 11   CREATININE 4.7* 6.3* 5.8*   CALCIUM 8.4* 8.1* 7.9*   PROT  --   --  6.1   ALBUMIN  --  2.8* 2.5*   BILITOT  --   --  0.9   ALKPHOS  --   --  56   AST  --   --  12   ALT  --   --  7*   ANIONGAP 21* 22* 23*   ESTGFRAFRICA 10.0* 7.0* 7.8*   EGFRNONAA 8.7* 6.1* 6.7*   , CBC   Recent Labs   Lab 08/15/19  0400 08/16/19  0822   WBC 3.57* 4.03   HGB 8.9* 8.8*   HCT 26.9* 26.6*   * 123*   , INR   No results for input(s): INR, PROTIME in the last 48 hours., Lipid Panel   No results for input(s): CHOL, HDL, LDLCALC, TRIG, CHOLHDL in the last 48 hours.,   Pathology Results  (Last 10 years)    None          Significant Imaging:  I have reviewed the imaging all significant imaging for the last 24 hours.      Assessment and Plan:     IMPRESSION:  1. Altered mental status. Secondary to seizure activity vs hypoglyemia. Head CT negative. EEG consistent with encephalopathy, negative for seizure activity. Waxing and waning symptoms. Etiology? Neurology on board. EEG from yesterday without seizure activity, improving. Mental status improving.   2. Congestive heart failure. Acute on chronic. LVEF ~40-45% on last echo 7/2019. Euvolemic on exam.   3. Hypoglycemia. Etiology likely poor oral intake. Endocrine work up in progress. D 20 currently off. Blood glucoses labile. Did not drop below 50 yesterday.   4. Calciphlyaxis. Left leg ulcer. Undergoing wound care treatments.   5. Atrial fibrillation. Valvular. Persistent. Patient does not wish to be on any oral anticoagulation or to be  cardioverted. Currently with varying rates. Cardizem drip off.   6. Hypertension. Better controlled at present. Elevates with agitation. Periods of hypotension.   7. s/p MVR via right thoracotomy approach on 3/18/19 with Dr. Lora. Functioning appropriately per last echo.   8. Mildly elevated troponins in the presence of ESRD. Chronic. Not consistent with ACS.   9. CAD. Nonobstructive on [facility] done on 3/13/2019.   10. End-stage renal disease on hemodialysis MWF. Anuric. Followed by Dr. Ingram.   11. Hypocalcemia. Nephrology on board and managing.   12. Hypokalemia.  Improved.    13. Hyperlipidemia  14. Chronic tobacco abuse.  15. COPD on home O2 at 2L  16. Reported history of acute pancreatitis. Amylase, Lipase WNL.       PLAN:    1. Encourage PT/OT.  2. Start Cardizem 30 mg PO TID.       Ramila Bloom NP  Cardiology  [facility]      I have personally seen and examined the patient. I reviewed the notes, assessments, and/or procedures performed by Ms Ramila Bloom, I concur with her documentation of Griselda Neely.

## 2019-08-17 LAB
ANION GAP SERPL CALC-SCNC: 18 MMOL/L (ref 8–16)
BACTERIA BLD CULT: NORMAL
BACTERIA BLD CULT: NORMAL
BUN SERPL-MCNC: 8 MG/DL (ref 8–23)
C PEPTIDE SERPL-MCNC: 1.9 NG/ML (ref 1.1–4.4)
CALCIUM SERPL-MCNC: 7.3 MG/DL (ref 8.7–10.5)
CHLORIDE SERPL-SCNC: 92 MMOL/L (ref 95–110)
CO2 SERPL-SCNC: 28 MMOL/L (ref 23–29)
CREAT SERPL-MCNC: 4 MG/DL (ref 0.5–1.4)
EST. GFR  (AFRICAN AMERICAN): 12.2 ML/MIN/1.73 M^2
EST. GFR  (NON AFRICAN AMERICAN): 10.6 ML/MIN/1.73 M^2
GLUCOSE SERPL-MCNC: 105 MG/DL (ref 70–110)
GLUCOSE SERPL-MCNC: 44 MG/DL (ref 70–110)
GLUCOSE SERPL-MCNC: 56 MG/DL (ref 70–110)
GLUCOSE SERPL-MCNC: 57 MG/DL (ref 70–110)
GLUCOSE SERPL-MCNC: 59 MG/DL (ref 70–110)
GLUCOSE SERPL-MCNC: 61 MG/DL (ref 70–110)
GLUCOSE SERPL-MCNC: 63 MG/DL (ref 70–110)
GLUCOSE SERPL-MCNC: 64 MG/DL (ref 70–110)
GLUCOSE SERPL-MCNC: 80 MG/DL (ref 70–110)
INSULIN SERPL-ACNC: 2.1 UIU/ML (ref 2.6–24.9)
MAGNESIUM SERPL-MCNC: 1.8 MG/DL (ref 1.6–2.6)
POTASSIUM SERPL-SCNC: 3.2 MMOL/L (ref 3.5–5.1)
SODIUM SERPL-SCNC: 138 MMOL/L (ref 136–145)
VANCOMYCIN SERPL-MCNC: 18.4 UG/ML

## 2019-08-17 PROCEDURE — 63600175 PHARM REV CODE 636 W HCPCS: Performed by: INTERNAL MEDICINE

## 2019-08-17 PROCEDURE — 25000003 PHARM REV CODE 250: Performed by: INTERNAL MEDICINE

## 2019-08-17 PROCEDURE — 21400001 HC TELEMETRY ROOM

## 2019-08-17 PROCEDURE — 97164 PT RE-EVAL EST PLAN CARE: CPT

## 2019-08-17 PROCEDURE — 63600175 PHARM REV CODE 636 W HCPCS: Performed by: NURSE PRACTITIONER

## 2019-08-17 PROCEDURE — 25000003 PHARM REV CODE 250: Performed by: NURSE PRACTITIONER

## 2019-08-17 PROCEDURE — 97168 OT RE-EVAL EST PLAN CARE: CPT

## 2019-08-17 PROCEDURE — 63600175 PHARM REV CODE 636 W HCPCS: Performed by: PSYCHIATRY & NEUROLOGY

## 2019-08-17 PROCEDURE — 36415 COLL VENOUS BLD VENIPUNCTURE: CPT

## 2019-08-17 PROCEDURE — 80202 ASSAY OF VANCOMYCIN: CPT

## 2019-08-17 PROCEDURE — 25000003 PHARM REV CODE 250

## 2019-08-17 PROCEDURE — 80048 BASIC METABOLIC PNL TOTAL CA: CPT

## 2019-08-17 PROCEDURE — C9254 INJECTION, LACOSAMIDE: HCPCS | Performed by: NURSE PRACTITIONER

## 2019-08-17 PROCEDURE — 83735 ASSAY OF MAGNESIUM: CPT

## 2019-08-17 RX ORDER — DEXTROSE MONOHYDRATE 100 MG/ML
INJECTION, SOLUTION INTRAVENOUS CONTINUOUS
Status: ACTIVE | OUTPATIENT
Start: 2019-08-17 | End: 2019-08-18

## 2019-08-17 RX ORDER — POTASSIUM CHLORIDE 14.9 MG/ML
40 INJECTION INTRAVENOUS ONCE
Status: COMPLETED | OUTPATIENT
Start: 2019-08-17 | End: 2019-08-17

## 2019-08-17 RX ORDER — VANCOMYCIN HCL IN 5 % DEXTROSE 1G/250ML
1000 PLASTIC BAG, INJECTION (ML) INTRAVENOUS ONCE
Status: DISCONTINUED | OUTPATIENT
Start: 2019-08-17 | End: 2019-08-17

## 2019-08-17 RX ADMIN — DEXTROSE MONOHYDRATE 500 MG: 50 INJECTION, SOLUTION INTRAVENOUS at 02:08

## 2019-08-17 RX ADMIN — DEXTROSE MONOHYDRATE: 10 INJECTION, SOLUTION INTRAVENOUS at 08:08

## 2019-08-17 RX ADMIN — CHLORHEXIDINE GLUCONATE 15 ML: 1.2 RINSE ORAL at 08:08

## 2019-08-17 RX ADMIN — MUPIROCIN: 20 OINTMENT TOPICAL at 08:08

## 2019-08-17 RX ADMIN — DILTIAZEM HYDROCHLORIDE 30 MG: 30 TABLET, FILM COATED ORAL at 08:08

## 2019-08-17 RX ADMIN — SODIUM CHLORIDE 50 MG: 9 INJECTION, SOLUTION INTRAVENOUS at 01:08

## 2019-08-17 RX ADMIN — SODIUM CHLORIDE 50 MG: 9 INJECTION, SOLUTION INTRAVENOUS at 02:08

## 2019-08-17 RX ADMIN — PIPERACILLIN AND TAZOBACTAM 4.5 G: 4; .5 INJECTION, POWDER, LYOPHILIZED, FOR SOLUTION INTRAVENOUS; PARENTERAL at 08:08

## 2019-08-17 RX ADMIN — DEXTROSE MONOHYDRATE 12.5 G: 500 INJECTION PARENTERAL at 08:08

## 2019-08-17 RX ADMIN — HEPARIN SODIUM 5000 UNITS: 5000 INJECTION INTRAVENOUS; SUBCUTANEOUS at 04:08

## 2019-08-17 RX ADMIN — DILTIAZEM HYDROCHLORIDE 30 MG: 30 TABLET, FILM COATED ORAL at 07:08

## 2019-08-17 RX ADMIN — ONDANSETRON 4 MG: 2 INJECTION INTRAMUSCULAR; INTRAVENOUS at 08:08

## 2019-08-17 RX ADMIN — DEXTROSE MONOHYDRATE 12.5 G: 500 INJECTION PARENTERAL at 04:08

## 2019-08-17 RX ADMIN — DEXTROSE MONOHYDRATE 250 MG: 50 INJECTION, SOLUTION INTRAVENOUS at 11:08

## 2019-08-17 RX ADMIN — POTASSIUM CHLORIDE 40 MEQ: 14.9 INJECTION, SOLUTION INTRAVENOUS at 09:08

## 2019-08-17 RX ADMIN — LEVETIRACETAM 250 MG: 100 INJECTION, SOLUTION, CONCENTRATE INTRAVENOUS at 08:08

## 2019-08-17 RX ADMIN — DILTIAZEM HYDROCHLORIDE 30 MG: 30 TABLET, FILM COATED ORAL at 02:08

## 2019-08-17 RX ADMIN — ASPIRIN 81 MG: 81 TABLET, COATED ORAL at 08:08

## 2019-08-17 RX ADMIN — HEPARIN SODIUM 5000 UNITS: 5000 INJECTION INTRAVENOUS; SUBCUTANEOUS at 08:08

## 2019-08-17 RX ADMIN — MUPIROCIN: 20 OINTMENT TOPICAL at 01:08

## 2019-08-17 RX ADMIN — DEXTROSE MONOHYDRATE 12.5 G: 500 INJECTION PARENTERAL at 06:08

## 2019-08-17 NOTE — NURSING
Pt in wheelchair when report received with family at bedside. transferred to commode from wheelchair with standby assist. vimpat infusing and patient stated she wanted to take a nap. Slept for two hours and when awakened for transfer she is very drowsy, responds appropriately but very sleepy. FSBS 59. Pt ate candy, pizza and soda before napping.

## 2019-08-17 NOTE — PLAN OF CARE
Problem: Physical Therapy Goal  Goal: Physical Therapy Goal  Goals to be met by: D/C    Patient will increase functional independence with mobility by performin. Supine to sit with [place]  2. Sit to stand transfer with mod I w/ RW  3. Gait  x 150 feet with Spv using Rolling Walker.  4. Bed to chair w/ Spv A w/ RW      Outcome: Ongoing (interventions implemented as appropriate)  Goals to be met by: discharge    1. Spv assist with supine to sit  2. Spv w/ RW for sit to stand  3. 10 feet with RW and CGA, self limiting  4. CGA w/ RW for bed to chair

## 2019-08-17 NOTE — PROGRESS NOTES
[facility]  Cardiology  Progress Note    Patient Name: Griselda Neely  MRN: [id number]  Admission Date: 8/6/2019  Hospital Length of Stay: 11 days  Code Status: Full Code   Attending Physician: Amos Snyder MD   Primary Care Physician: Kalie Neely MD  Expected Discharge Date:   Principal Problem:Encephalopathy, toxic    Subjective:       Interval History:   Patient is resting at present. Responding to verbal stimuli with 1 word responses. Per nursing, she has not been confused and is more appropriate.   She has been stable off of the Cardizem drip.  Still not eating very much and remains hypoglycemia at times.    ROS:  No significant headaches or sore throat or runny nose.   No recent changes in vision.   No recent changes in hearing.  No dysphagia or odynophagia.  Denies chest pain and shortness of breath at baseline.   Denies any cough or hemoptysis.   Denies any abdominal pain, nausea, vomiting, diarrhea or constipation.   Denies any dysuria or polyuria.   Denies any fevers or chills.   Denies any recent significant weight changes.   Denies bleeding diathesis       Cardiology:  Atrial fibrillation, varying rates. Cardizem drip off.   Objective:     Vital Signs (Most Recent):  Temp: 98.9 °F (37.2 °C) (08/17/19 0701)  Pulse: 91 (08/17/19 0900)  Resp: 16 (08/17/19 0900)  BP: 107/71 (08/17/19 0900)  SpO2: 98 % (08/17/19 0900) Vital Signs (24h Range):  Temp:  [97.8 °F (36.6 °C)-98.9 °F (37.2 °C)] 98.9 °F (37.2 °C)  Pulse:  [] 91  Resp:  [12-42] 16  SpO2:  [75 %-100 %] 98 %  BP: ()/(35-84) 107/71     Weight: 56.5 kg (124 lb 9 oz)  Body mass index is 20.73 kg/m².    SpO2: 98 %  O2 Device (Oxygen Therapy): room air      Intake/Output Summary (Last 24 hours) at 8/17/2019 0917  Last data filed at 8/16/2019 1230  Gross per 24 hour   Intake 550 ml   Output 900 ml   Net -350 ml       Lines/Drains/Airways     Central Venous Catheter Line                 Percutaneous Central Line  Insertion/Assessment - triple lumen  08/12/19 1921 4 days          Drain                 Hemodialysis AV Fistula 08/08/19 0214 Right upper arm 9 days          Peripheral Intravenous Line                 Midline Catheter Insertion/Assessment  - Single Lumen 08/12/19 1700 Left median cubital vein (antecubital fossa) 4 days                Scheduled Meds:   aspirin  81 mg Oral Daily    chlorhexidine  15 mL Mouth/Throat BID    diltiaZEM  30 mg Oral Q8H    epoetin alfonzo-ebpx (RETACRIT) injection  50 Units/kg Intravenous Every Mon, Wed, Fri    heparin (porcine)  5,000 Units Subcutaneous Q8H    lacosamide (VIMPAT) IVPB  50 mg Intravenous Q12H    [START ON 8/18/2019] levetiracetam IVPB  750 mg Intravenous Daily    mupirocin   Nasal BID    piperacillin-tazobactam (ZOSYN) IVPB  4.5 g Intravenous Q12H    potassium chloride in water  40 mEq Intravenous Once    sodium thiosulfate  12.5 g Intravenous Every Mon, Wed, Fri    valproate sodium (DEPACON) IVPB  250 mg Intravenous Q24H    valproate sodium (DEPACON) IVPB  500 mg Intravenous Q24H     Continuous Infusions:   dilTIAZem 5 mg/hr (08/15/19 0207)    norepinephrine bitartrate-D5W Stopped (08/12/19 2000)     PRN Meds:.sodium chloride 0.9%, dextrose 50%, dicyclomine, glucagon (human recombinant), insulin aspart U-100, levetiracetam IVPB, metoprolol, ondansetron, piperacillin-tazobactam (ZOSYN) IVPB, sodium chloride 0.9%     Physical Exam     HEENT: Normocephalic, atraumatic, PERRL, Conjunctiva pink, no scleral icterus.   CVS: S1S2+, Irregular, + SM, rubs or gallops, JVP: Normal.  LUNGS: Clear, diminished in the bases.   ABDOMEN: Soft, NT, BS+  EXTREMITIES: No cyanosis, clubbing or edema. Left upper arm fistula + thrill + bruit. Left thigh calciphylaxis ulcer with dressing CDI.   NEURO: Sleeping at present. Per nursing, she is more alert and conversing appropriately.        Significant Labs:   Blood Culture:   No results for input(s): LABBLOO in the last 48 hours.,  BMP:   Recent Labs   Lab 08/16/19  0746 08/16/19  0822 08/16/19  1355 08/17/19  0609   GLU 59* 57* 48* 57*    137  --  138   K 3.7 3.6  --  3.2*   CL 96 95  --  92*   CO2 20* 19*  --  28   BUN 12 11  --  8   CREATININE 6.3* 5.8*  --  4.0*   CALCIUM 8.1* 7.9*  --  7.3*   , CMP   Recent Labs   Lab 08/16/19  0746 08/16/19  0822 08/16/19  1355 08/17/19  0609    137  --  138   K 3.7 3.6  --  3.2*   CL 96 95  --  92*   CO2 20* 19*  --  28   GLU 59* 57* 48* 57*   BUN 12 11  --  8   CREATININE 6.3* 5.8*  --  4.0*   CALCIUM 8.1* 7.9*  --  7.3*   PROT  --  6.1  --   --    ALBUMIN 2.8* 2.5*  --   --    BILITOT  --  0.9  --   --    ALKPHOS  --  56  --   --    AST  --  12  --   --    ALT  --  7*  --   --    ANIONGAP 22* 23*  --  18*   ESTGFRAFRICA 7.0* 7.8*  --  12.2*   EGFRNONAA 6.1* 6.7*  --  10.6*   , CBC   Recent Labs   Lab 08/16/19 0822   WBC 4.03   HGB 8.8*   HCT 26.6*   *   , INR   No results for input(s): INR, PROTIME in the last 48 hours., Lipid Panel   No results for input(s): CHOL, HDL, LDLCALC, TRIG, CHOLHDL in the last 48 hours.,   Pathology Results  (Last 10 years)    None          Significant Imaging:  I have reviewed the imaging all significant imaging for the last 24 hours.      Assessment and Plan:     IMPRESSION:  1. Altered mental status. Secondary to seizure activity vs hypoglyemia. Head CT negative. EEG consistent with encephalopathy, negative for seizure activity. Waxing and waning symptoms. Etiology? Neurology on board. EEG from yesterday without seizure activity, improving. Mental status improving.   2. Congestive heart failure. Acute on chronic. LVEF ~40-45% on last echo 7/2019. Euvolemic on exam.   3. Hypoglycemia. Etiology likely poor oral intake. Endocrine work up in progress. D 20 currently off. Blood glucoses labile. Glucose dropped to 44 and 1/2 amp D50W given. C peptide level normal. Insulin level 2.1 (low).   4. Calciphlyaxis. Left leg ulcer. Undergoing wound care treatments.    5. Atrial fibrillation. Valvular. Persistent. Patient does not wish to be on any oral anticoagulation or to be cardioverted. Currently rate controlled.   6. Hypertension. Better controlled at present. Elevates with agitation. Periods of hypotension. Seems better.   7. s/p MVR via right thoracotomy approach on 3/18/19 with Dr. Lora. Functioning appropriately per last echo.   8. Mildly elevated troponins in the presence of ESRD. Chronic. Not consistent with ACS.   9. CAD. Nonobstructive on [facility] done on 3/13/2019.   10. End-stage renal disease on hemodialysis MWF. Anuric. Followed by Dr. Ingram.   11. Hypocalcemia. Nephrology on board and managing.   12. Hypokalemia.  Ongoing. Replacement ordered.   13. Hyperlipidemia  14. Chronic tobacco abuse.  15. COPD on home O2 at 2L  16. Reported history of acute pancreatitis. Amylase, Lipase WNL.   17. Anemia. Acute on chronic.     PLAN:    1. Continue to encourage PT/OT and oral intake.   2. Replace potassium. Check magnesium.  3. Stable for transfer out of ICU from our standpoint.     Ramila Bloom NP  Cardiology  [facility]      I have personally seen and examined the patient. I reviewed the notes, assessments, and/or procedures performed by Ms Ramila Bloom, I concur with her documentation of Griselda Neely.

## 2019-08-17 NOTE — PROGRESS NOTES
Formerly [facility]  Neurology  Progress Note    Patient Name: Griselda Neely  MRN: [id number]  Admission Date: 8/6/2019  Hospital Length of Stay: 11 days  Code Status: Full Code   Attending Provider: Amos Snyder MD  Primary Care Physician: Kalie Neely MD   Principal Problem:Encephalopathy, toxic    Subjective:     Interval History: Patient seen and examined. Sister at bedside. Pt to be transferred to floor today, as she is doing better. HD yesterday. No sz or events per nursing.     Current Neurological Medications: MAR reviewed    HPI: Griselda Neely is a 73 y.o. female with a history as below who presented to the ED from her cardiologist office via  for evaluation of altered mental status changes.  Sister at bedside reports patient has been in [facility] s/p discharge from hospital ~ 3 weeks ago due to encephalopathy. Pt was started on Keppra and Vimpat during that admission. Sister at bedside further states patient stopped eating and drinking on Friday. Further reports patient with ESRD, had dialysis on Wednesday and Friday but was taken off early. Also reports went to HD on Monday.  Per chart review, patient hospitalized In July 2019 and developed acute encephalopathy during that admission, neurology was consulted with extensive work-up and non-acute findings. On admit, labs and imaging reviewed.  Neurology consulted with recs for MRI/MRA and EEG.     CRT: 6.7  K: 3.4  LDL: 55.2  Ammonia: 18  B12: 1045  TSH: 2.7       Brain imaging:  CT head 8/12/19:  1. No acute intracranial abnormality.  2. Chronic small vessel ischemic changes.     CT head:   No acute intracranial abnormality.    EEG 8/7:  This is an abnormal awake and drowsy routine EEG due to diffuse   slowing of the background activity consistent with a mild to   moderate encephalopathy.    EEG 8/12:  Impression:  Moderate encephalopathy with recurrent epileptiform   activity. No Seizures. Recommend LTM and close  monitoring.    EEG LTM 8/12: p    EEG 8/14/19: Moderate encephalopathy. No Seizures. Improved from previous study --- final report pending       Current Facility-Administered Medications   Medication Dose Route Frequency Provider Last Rate Last Dose    0.9%  NaCl infusion   Intravenous PRN Dane Martinez MD        aspirin EC tablet 81 mg  81 mg Oral Daily TODD Rush   81 mg at 08/17/19 0806    chlorhexidine 0.12 % solution 15 mL  15 mL Mouth/Throat BID Lizett Mann PharmD   15 mL at 08/17/19 0806    dextrose 50% injection 12.5 g  12.5 g Intravenous PRN TODD Rush   12.5 g at 08/17/19 0817    dicyclomine capsule 10 mg  10 mg Oral QID PRN Hilary La MD        diltiaZEM 125 mg in D5W 125 mL infusion  5 mg/hr Intravenous Continuous Katharina Powell MD 5 mL/hr at 08/15/19 0207 5 mg/hr at 08/15/19 0207    diltiaZEM tablet 30 mg  30 mg Oral Q8H Ramila Bloom NP   30 mg at 08/17/19 0707    epoetin alfonzo-epbx injection 2,800 Units  50 Units/kg Intravenous Every Mon, Wed, Fri Chalino Victoria MD   2,800 Units at 08/14/19 1256    glucagon (human recombinant) injection 1 mg  1 mg Intramuscular PRN TODD Rush        heparin (porcine) injection 5,000 Units  5,000 Units Subcutaneous Q8H TODD Rush   5,000 Units at 08/17/19 0806    insulin aspart U-100 pen 0-5 Units  0-5 Units Subcutaneous Q6H PRN TODD Rush        lacosamide (VIMPAT) 50 mg in sodium chloride 0.9% 100 mL IVPB  50 mg Intravenous Q12H Maggie Blood  mL/hr at 08/17/19 0256 50 mg at 08/17/19 0256    levETIRAcetam (KEPPRA) 250 mg in dextrose 5 % 100 mL IVPB  250 mg Intravenous Daily Jef Florentino  mL/hr at 08/17/19 0824 250 mg at 08/17/19 0824    levETIRAcetam (KEPPRA) 500 mg in dextrose 5 % 100 mL IVPB  500 mg Intravenous Daily PRN Maggie Blood NP        metoprolol injection 5 mg  5 mg Intravenous Q6H PRN Daren Lea MD   5 mg at 08/12/19 6494     mupirocin 2 % ointment   Nasal BID Lizett Mann PharmD        norepinephrine 4 mg in dextrose 5 % 250 mL infusion  0.02 mcg/kg/min Intravenous Continuous Daren Lea MD   Stopped at 08/12/19 2000    ondansetron injection 4 mg  4 mg Intravenous Q4H PRN Hilary La MD   4 mg at 08/15/19 0458    piperacillin-tazobactam (ZOSYN) 0.75 g in dextrose 5 % 100 mL  0.75 g Intravenous PRN Daren Lea MD        piperacillin-tazobactam 4.5 g in dextrose 5 % 100 mL IVPB (ready to mix system)  4.5 g Intravenous Q12H Daren Lea MD 25 mL/hr at 08/17/19 0805 4.5 g at 08/17/19 0805    sodium chloride 0.9% flush 10 mL  10 mL Intravenous PRN TODD Rush        sodium thiosulfate 12.5 gram/50 mL (250 mg/mL) injection 12.5 g  12.5 g Intravenous Every Mon, Wed, Fri Thomas John Martinez MD   12.5 g at 08/16/19 1151    valproate (DEPACON) 250 mg in dextrose 5 % 50 mL IVPB  250 mg Intravenous Q24H Maggie Blood NP 50 mL/hr at 08/16/19 1343 250 mg at 08/16/19 1343    valproate (DEPACON) 500 mg in dextrose 5 % 50 mL IVPB  500 mg Intravenous Q24H Maggie Blood NP 50 mL/hr at 08/17/19 0253 500 mg at 08/17/19 0253       Review of Systems as per HPI  Objective:     Vital Signs (Most Recent):  Temp: 98.6 °F (37 °C) (08/17/19 0215)  Pulse: (!) 137 (08/17/19 0215)  Resp: (!) 24 (08/17/19 0215)  BP: 139/76 (08/17/19 0215)  SpO2: 100 % (08/17/19 0215) Vital Signs (24h Range):  Temp:  [97.8 °F (36.6 °C)-98.6 °F (37 °C)] 98.6 °F (37 °C)  Pulse:  [] 137  Resp:  [12-42] 24  SpO2:  [75 %-100 %] 100 %  BP: ()/(35-84) 139/76     Weight: 56.5 kg (124 lb 9 oz)  Body mass index is 20.73 kg/m².    Physical Exam   Constitutional: She is oriented to person, place, and time. She appears well-developed.   thin   HENT:   Head: Normocephalic and atraumatic.   Eyes: Pupils are equal, round, and reactive to light. EOM are normal.   Neck: Normal range of motion. Neck supple.   Cardiovascular: Normal rate, regular  rhythm and normal heart sounds.   Pulmonary/Chest: Effort normal and breath sounds normal.   Abdominal: Soft.   Musculoskeletal: Normal range of motion.   Neurological: She is alert and oriented to person, place, and time. She has normal strength. She has a normal Finger-Nose-Finger Test.   Skin: Skin is warm and dry.   Psychiatric: Her speech is normal.       NEUROLOGICAL EXAMINATION:     MENTAL STATUS   Oriented to person, place, and time.   Oriented to person.   Oriented to place.   Speech: speech is normal   Level of consciousness: alert       Follows commands  Able to answer in complete sentences  Marked improvement     CRANIAL NERVES   Cranial nerves II through XII intact.     CN III, IV, VI   Pupils are equal, round, and reactive to light.  Extraocular motions are normal.     MOTOR EXAM     Strength   Strength 5/5 throughout.   Right neck flexion: 4/5  Left neck flexion: 4/5  Right neck extension: 4/5  Left neck extension: 4/5  Right deltoid: 4/5  Left deltoid: 4/5  Right biceps: 4/5  Left biceps: 4/5  Right triceps: 4/5  Left triceps: 4/5  Right wrist flexion: 4/5  Left wrist flexion: 4/5  Right wrist extension: 4/5  Left wrist extension: 4/5  Right interossei: 4/5  Left interossei: 4/5  Right abdominals: 4/5  Left abdominals: 4/5  Right iliopsoas: 4/5  Left iliopsoas: 4/5  Right quadriceps: 4/5  Left quadriceps: 4/5  Right hamstrin/5  Left hamstrin/5  Right glutei: 4/5  Left glutei: 4/5  Right anterior tibial: 4/5  Left anterior tibial: 4/5  Right posterior tibial: 4/5  Left posterior tibial: 4/5  Right peroneal: 4/5  Left peroneal: 4/5  Right gastroc: 4/5  Left gastroc: 4/5    SENSORY EXAM   Light touch normal.     GAIT AND COORDINATION      Coordination   Finger to nose coordination: normal      Significant Labs: All pertinent lab results from the past 24 hours have been reviewed.        Assessment and Plan:   Encephalopathy  -Multifactorial & most likely metabolic-toxic r/t  dehydration/malnutrition, infection,uremia, hypoglycemia and medication-induced  -EEG: improving  -B12, TSH, Folate, Ammonia WNL  -Previous MRI brain done July 2019 negative acute, shows remote ischemia   -Clinically better     History of seizures  -Abnormal EEGs on previous admit and pt started on Vimpat and Keppra  -EEG: no seizures  -Continue Keppra 750mg daily,  additional 500mg after dialysis; level therapeutic 8/13  -Continue Vimpat 50mg daily; level subtherapeutic 8/13  -Continue depakote 250mg in the am and 500mg in the pm; level subtherapeutic 8/16  - Pt clinically improving, follow      ESRD  -Nephrology following, on HD     Pneumonia   -Management per IM     Hypoglycemia  - defer to HM and endocrine    Hypokalemia  - mgmt per IM, recommend electrolyte maintenance WNL given sz predisposition    Thrombocytopenia  - Mgmt per IM, monitor for clinical deterioriation    pAfib with RVR  - mgmt per IM  - Pt not on full AC - anemia/thrombocytopenia         Seizure education.    No driving for now, no swimming alone, no climbing high areas, no operation of heavy machinery or working with high risk electricity equipment.  Continue to take AEDs as prescribed. If any major side effects from neurological medications go to the ED including mood changes and severe depression.  Patient and/or next of kin informed.  Follow up Neurology in 2-4 weeks at [phone].  Medication side effects discussed with the patient and/or caregiver.  Active Diagnoses:    Diagnosis Date Noted POA    Hypoglycemia, unspecified [E16.2] 08/15/2019 Unknown    Altered mental status [R41.82] 08/12/2019 Yes    Encephalomalacia on imaging study [G93.89] 08/06/2019 Yes    Pneumonia due to infectious organism [J18.9] 08/06/2019 Yes    ESRD (end stage renal disease) [N18.6] 05/11/2016 Yes      Problems Resolved During this Admission:    Diagnosis Date Noted Date Resolved POA    PRINCIPAL PROBLEM:  Encephalopathy, metabolic  [G92] 08/06/2019  08/09/2019 Yes    Disorientation [R41.0] 08/06/2019 08/09/2019 Yes    Hypoglycemia [E16.2] 08/06/2019 08/09/2019 Yes    Elevated lactic acid level [R79.89] 08/06/2019 08/09/2019 Yes    Altered mental status [R41.82] 08/06/2019 08/09/2019 Yes    Atrial fibrillation with RVR [I48.91] 07/18/2017 08/09/2019 Yes       VTE Risk Mitigation (From admission, onward)        Ordered     heparin (porcine) injection 5,000 Units  Every 8 hours      08/06/19 2001     Place ROSANNA hose  Until discontinued      08/06/19 2001     IP VTE HIGH RISK PATIENT  Once      08/06/19 2001          Archana Cisneros NP  Neurology  Counts include 234 beds at the [facility]    I, Dr. Ramses Florentino, discussed care with my advanced practitioner and agree with above. I have reviewed patient clinical presentation, work up, impression and plan.

## 2019-08-17 NOTE — PLAN OF CARE
Problem: Occupational Therapy Goal  Goal: Occupational Therapy Goal  Goals to be met by: Discharge    Patient will increase functional independence with ADLs by performing:    UE Dressing with Supervision.  LE Dressing with Supervision.  Grooming while standing with Supervision.  Supine to sit with Modified [place].  Step transfer with Supervision  Toilet transfer to bedside commode with Supervision.  Increased functional strength to WFL for ADLS.  Upper extremity exercise program 10 reps per handout, with supervision.    Outcome: Ongoing (interventions implemented as appropriate)  OT re-eval completed 2/2 pt transfered to ICU on 8/12/2019 due to MS change and seizure vs hypoglycemia:  EEG showed mod encephalopathy. OT goals established as above.  Continue with OT POC. Pt. has DME in pace at home.

## 2019-08-17 NOTE — PT/OT/SLP RE-EVAL
"Physical Therapy Re-evaluation    Patient Name:  Griselda Neely   MRN:  [id number]    Recommendations:     Discharge Recommendations:      Discharge Equipment Recommendations:     Barriers to discharge: None    Assessment:     Griselda Neely is a 73 y.o. female admitted with a medical diagnosis of Encephalopathy, toxic.  She presents with the following impairments/functional limitations:  Upon re-evaluation, patient requiring less assistance on transfers, performing supine to sit and sit to stand w/ Spv A. While patient only ambulating 10 feet with RW and CGA, pt is self limiting. "I am cold." Pt would benefit from further PT to address deficits in gait, endurance, and strength.    Rehab Prognosis:  good; patient would benefit from acute skilled PT services to address these deficits and reach maximum level of function.      Recent Surgery: * No surgery found *      Plan:     During this hospitalization, patient to be seen 6 x/week to address the above listed problems via gait training, therapeutic activities, therapeutic exercises  · Plan of Care Expires:  09/07/19   Plan of Care Reviewed with: patient, family    Subjective     Communicated with nsg prior to session.  Patient found supine with central line, telemetry, and pulse ox, upon PT entry to room, agreeable to evaluation.      Chief Complaint: "cold"  Patient comments/goals: Pt agreeing to walk from bed to wheelchair.  Pain/Comfort:  ·      Patients cultural, spiritual, [employer] conflicts given the current situation:        Objective:     Patient found with:       General Precautions: Standard, fall   Orthopedic Precautions:    Braces:       Exams:  · Cognitive Exam:  Patient is oriented to questions asked and commands given  · RUE Strength: WFL  · LUE Strength: WFL  · RLE Strength: 4/5  · LLE Strength: 4/5    Functional Mobility:  · Bed Mobility:     · Supine to Sit: supervision  · Transfers:     · Sit to Stand:  supervision with rolling walker  · Bed to " Chair: contact guard assistance with  rolling walker  using  Step Transfer  · Gait: 10 ft w/ RW and CGA and small shuffling steps (self limiting)  · Balance: normal sitting; fair standing    AM-PAC 6 CLICK MOBILITY  Total Score:        Therapeutic Activities and Exercises:   none    Patient left up in chair with all lines intact, call button in reach and nursing and family present.    GOALS:   Multidisciplinary Problems     Physical Therapy Goals        Problem: Physical Therapy Goal    Goal Priority Disciplines Outcome Goal Variances Interventions   Physical Therapy Goal     PT, PT/OT      Description:  Goals to be met by: D/C    Patient will increase functional independence with mobility by performin. Supine to sit with Stand-by Assistance  2. Sit to stand transfer with Contact Guard Assistance  3. Gait  x  feet with Contact Guard Assistance using Rolling Walker.                       History:     Past Medical History:   Diagnosis Date    Anemia     Calciphylaxis 2017    both legs    CHF (congestive heart failure)     Encounter for blood transfusion 2016    Gout     Hypertension     Mitral valve regurgitation     Osteoarthritis     Pancreatitis     Peripheral vascular disease     Peritoneal dialysis catheter in place     Pneumonia 2017    Renal failure        Past Surgical History:   Procedure Laterality Date    ACHILLES TENDON SURGERY Right     APPENDECTOMY      CARDIAC CATHETERIZATION  2017    CHOLECYSTECTOMY      heal surgery Right     HYSTERECTOMY      INSERTION-PERITONEAL DIALYSIS CATHETER-LAPAROSCOPIC N/A 2016    [facility]    PARATHYROIDECTOMY      PARATHYROIDECTOMY  2017    PARATHYROIDECTOMY N/A 2017    [facility] at [facility]    PERITONEAL CATHETER INSERTION      REIMPLANTATION-PARATHYROID TISSUE N/A 2017    [facility]     REMOVAL-CATHETER-DIALYSIS-PERITONEAL N/A 9/19/2017    [facility] OR    RENAL BIOPSY      vocal cord nodule         Time Tracking:     PT Received On:    PT Start Time:       PT Stop Time:    PT Total Time (min):       Billable Minutes: Taylor 27 minutes      Rambo Hernandez, PT  08/17/2019

## 2019-08-17 NOTE — PT/OT/SLP RE-EVAL
Occupational Therapy   Re-evaluation    Name: Griselda Neely  MRN: [id number]  Admitting Diagnosis:  Encephalopathy, toxic     The primary encounter diagnosis was Altered mental status, unspecified altered mental status type. Diagnoses of Stroke, Shortness of breath, Sepsis, due to unspecified organism, Hypoglycemia, Seizure, Atrial fibrillation with rapid ventricular response, Pleural effusion, Bronchitis, Encephalopathy, toxic, and ESRD (end stage renal disease) were also pertinent to this visit.      Recommendations:     Discharge Recommendations: home with home health, home health OT(24/7 supervision)  Discharge Equipment Recommendations:  none(pt has all DME in place at home)  Barriers to discharge:  None    Assessment:     Griselda Neely is a 73 y.o. female with a medical diagnosis of Encephalopathy, toxic. OT re-eval completed 2/2 pt transfered to ICU on 8/12/2019 due to MS change and seizure vs hypoglycemia:  EEG showed mod encephalopathy. OT goals established. Pt. has DME in place at home.  She presents with pain L thigh sore and decline in ADLs and fx mobility.   Performance deficits affecting function are weakness, impaired endurance, impaired self care skills, impaired balance, gait instability, pain, impaired functional mobilty, decreased coordination, decreased safety awareness, impaired cognition.      Rehab Prognosis:  fair; patient would benefit from acute skilled OT services to address these deficits and reach maximum level of function.       Plan:     Patient to be seen 5 x/week to address the above listed problems via self-care/home management, therapeutic exercises, therapeutic activities  · Plan of Care Expires: 08/31/19  · Plan of Care Reviewed with: patient, family, friend    Subjective     Chief Complaint: L thigh pain from sore.  Patient/Family stated goals: none  Communicated with: nurse prior to session.  Pain/Comfort:  · Pain Rating 1: 10/10  · Location - Side 1: Left  · Location -  Orientation 1: generalized  · Location 1: thigh  · Pain Addressed 1: Reposition, Distraction, Cessation of Activity  · Pain Rating Post-Intervention 1: (not rated but no complaint once activity stopped)    Objective:     Communicated with: nurse prior to session.  Patient found left sidelying with: telemetry upon OT entry to room.    General Precautions: Standard, fall, seizure   Orthopedic Precautions:N/A   Braces:       Occupational Performance:    Bed Mobility:    · Patient completed Scooting/Bridging with stand by assistance and with side rail  · Patient completed Supine to Sit with stand by assistance, with side rail and slow moving    Functional Mobility/Transfers:  · Patient completed Sit <> Stand Transfer with contact guard assistance  with  no assistive device   · Patient completed Bed <> Chair Transfer using Step Transfer technique with contact guard assistance with no assistive device  · Functional Mobility: ambulated few steps bed>wc with SBA-CGA 2/2 LOB when turning to approach the chair.    Activities of Daily Living:  · Feeding:  did not want to eat but placed food in front of her advised her to eat her dinner. seated wc  · Lower Body Dressing: moderate assistance too painful in left thigh to don L sock but donned R sock Cristobal crossed leg.    Cognitive/Visual Perceptual:  Cognitive/Psychosocial Skills:     -       Oriented to: Person, Place and Time -month given choice of 3and not year given choice of 3.  -       Follows Commands/attention:Follows one-step commands and delayed and seemed more disinterested  -       Communication: clear/fluent  -       Memory: Poor immediate recall  -       Safety awareness/insight to disability: impaired   -       Mood/Affect/Coping skills/emotional control: Cooperative and Withdrawn  Visual/Perceptual:      -Intact glasses    Physical Exam:  Balance:    -       siting;  good/good  standing:  fair/poor plus  Postural examination/scapula alignment:    -       Rounded  shoulders  Skin integrity: Wound L thigh ace wrap bandage  Dominant hand:    -       right  Upper Extremity Range of Motion:     -       Right Upper Extremity: WFL  -       Left Upper Extremity: WFL  Upper Extremity Strength:    -       Right Upper Extremity: Deficits: 4-/5  -       Left Upper Extremity: Deficits: 4-/5   Strength:    -       Right Upper Extremity: WFL  -       Left Upper Extremity: WFL    AMPAC 6 Click:  AMPA Total Score: 18    Treatment & Education:  Role of OT andEducation:   POC    Patient left up in chair with all lines intact, call button in reach, nurse notified and sister and friend present    GOALS:   Multidisciplinary Problems     Occupational Therapy Goals        Problem: Occupational Therapy Goal    Goal Priority Disciplines Outcome Interventions   Occupational Therapy Goal     OT, PT/OT Ongoing (interventions implemented as appropriate)    Description:  Goals to be met by: Discharge    Patient will increase functional independence with ADLs by performing:    UE Dressing with Supervision.  LE Dressing with Supervision.  Grooming while standing with Supervision.  Supine to sit with Modified [place].  Step transfer with Supervision  Toilet transfer to bedside commode with Supervision.  Increased functional strength to WFL for ADLS.  Upper extremity exercise program 10 reps per handout, with supervision.                      History:     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    both legs    CHF (congestive heart failure)     Encounter for blood transfusion 03/2016    Gout     Hypertension     Mitral valve regurgitation     Osteoarthritis     Pancreatitis     Peripheral vascular disease     Peritoneal dialysis catheter in place     Pneumonia 09/09/2017    Renal failure        Past Surgical History:   Procedure Laterality Date    ACHILLES TENDON SURGERY Right     APPENDECTOMY      CARDIAC CATHETERIZATION  07/03/2017     CHOLECYSTECTOMY      heal surgery Right     HYSTERECTOMY      INSERTION-PERITONEAL DIALYSIS CATHETER-LAPAROSCOPIC N/A 4/26/2016    [facility]formed [facility] at [facility] OR    PARATHYROIDECTOMY      PARATHYROIDECTOMY  07/13/2017    PARATHYROIDECTOMY N/A 7/13/2017    Performed by [facility] at [facility] OR    PERITONEAL CATHETER INSERTION      REIMPLANTATION-PARATHYROID TISSUE N/A 7/13/2017    Performed [facility] at [facility] OR    REMOVAL-CATHETER-DIALYSIS-PERITONEAL N/A 9/19/2017    Performed by [facility] at [facility] OR    RENAL BIOPSY      vocal cord nodule         Time Tracking:     OT Date of Treatment: 08/17/19  OT Start Time: 1749  OT Stop Time: 1804  OT Total Time (min): 15 min    Billable Minutes:Re-eval 15  Total Time 15    Martina Rush OT  8/17/2019

## 2019-08-17 NOTE — PROGRESS NOTES
[facility] [place] Medicine  Progress Note    Patient Name: Griselda Neely  MRN: [id number]  Patient Class: IP- Inpatient   Admission Date: 8/6/2019  Length of Stay: 11 days  Attending Physician: Amos Snyder MD  Primary Care Provider: Kalie Neely MD        Subjective:     Principal Problem:Encephalopathy, toxic    Interval History:  Seen and examined, more awake and alert today, she started to eat.  Should be transferred out of the intensive care unit, she is still hyperglycemic.    Review of Systems  Objective:     Vital Signs (Most Recent):  Temp: 98.9 °F (37.2 °C) (08/17/19 0701)  Pulse: 79 (08/17/19 1500)  Resp: 18 (08/17/19 1500)  BP: 109/74 (08/17/19 1100)  SpO2: (!) 90 % (08/17/19 1201) Vital Signs (24h Range):  Temp:  [98.5 °F (36.9 °C)-98.9 °F (37.2 °C)] 98.9 °F (37.2 °C)  Pulse:  [] 79  Resp:  [12-25] 18  SpO2:  [55 %-100 %] 90 %  BP: ()/(52-80) 109/74     Weight: 56.5 kg (124 lb 9 oz)  Body mass index is 20.73 kg/m².    Intake/Output Summary (Last 24 hours) at 8/17/2019 1633  Last data filed at 8/17/2019 1000  Gross per 24 hour   Intake 320 ml   Output --   Net 320 ml      Physical Exam  General, no acute distress, awake alert oriented x3, started to eat  Lungs, clear to auscultation bilaterally  Heart regular rhythm and rate,  Abdomen soft, nontender nondistended  Extremities, she does have a decubital ulcer posterior right thigh, also decubitus ulcer sacral      Significant Labs:   BMP:   Recent Labs   Lab 08/17/19  0609   GLU 57*      K 3.2*   CL 92*   CO2 28   BUN 8   CREATININE 4.0*   CALCIUM 7.3*   MG 1.8     CBC:   Recent Labs   Lab 08/16/19  0822   WBC 4.03   HGB 8.8*   HCT 26.6*   *           Assessment/Plan:      Active Diagnoses:    Diagnosis Date Noted POA    Hypoglycemia, unspecified [E16.2] 08/15/2019 Unknown    Altered mental status [R41.82] 08/12/2019 Yes    Encephalomalacia on imaging study [G93.89] 08/06/2019 Yes    Pneumonia  due to infectious organism [J18.9] 08/06/2019 Yes    ESRD (end stage renal disease) [N18.6] 05/11/2016 Yes      Problems Resolved During this Admission:    Diagnosis Date Noted Date Resolved POA    PRINCIPAL PROBLEM:  Encephalopathy, metabolic  [G92] 08/06/2019 08/09/2019 Yes    Disorientation [R41.0] 08/06/2019 08/09/2019 Yes    Hypoglycemia [E16.2] 08/06/2019 08/09/2019 Yes    Elevated lactic acid level [R79.89] 08/06/2019 08/09/2019 Yes    Altered mental status [R41.82] 08/06/2019 08/09/2019 Yes    Atrial fibrillation with RVR [I48.91] 07/18/2017 08/09/2019 Yes     VTE Risk Mitigation (From admission, onward)        Ordered     heparin (porcine) injection 5,000 Units  Every 8 hours      08/06/19 2001     Place ROSANNA hose  Until discontinued      08/06/19 2001     IP VTE HIGH RISK PATIENT  Once      08/06/19 2001         Encephalopathy, metabolic   AMS  - waxing and waning, this seems to be improving    This is likely multifactorial - likely metabolic +/- uremia +/- infectious process  +/- hypoglycemia +/- medications induced.  - likely poor prognosis, considering recurrency  - neurology following -   - continue keppra. Vimpat redosed per neurology.   - move to icu for closer monitoring and continuous EEG per Dr. Ramses Florentino, need to r/o non-convulsive status given hx of seizure d/o   - if she continues to improve, possible transfer tomorrow to telemetry     Hypoglycemia  - poor po intake due to AMS  - restart D10 at 50 cc/hr, as per endocrinology frequent accuchecks   - continue to hold insulin, hold any po antihypoglycemics   - endocrinology consulted appreciate input     Failure to thrive  - poor prognosis, if she does not start eating  - family informed about GT, they will consider it; however, today she started to eat     Diabetes  - ISSS, continue hold PO meds     Pneumonia due to infectious organism  On admit CXR showed persistence of predominantly ground-glass type pulmonary opacities in the right mid  and lower lung zone,   -Continue zosyn     HTN,   - continue to  hold po agents for now with labile pressures      Atrial fibrillation with RVR  Patient with PAF on admit telemetry showed a-fib rvr, started on cardizem gtt in ED  -Continuous cardiac monitoring   -cardizem gtt if bp will tolerate     ESRD (end stage renal disease)  Nephrology consulted for inpatient HD       Amos Snyder MD  Department of Hospital Medicine   [facility]

## 2019-08-17 NOTE — PROGRESS NOTES
No acute overnight issues  Denies SOB, CP, angina    AA  NAD  RRR, no rub  CTA B ant; rales post  S/nt/nd              Vital Signs    Report   View Graph     07 0659  07 1621  Most Recent     Temp (°F) 97.8-98.6 98.9  98.9 (37.2)   0701   Pulse     79   1500   Resp 12-42  14-25   18   1500   BP 70/36 -166/75  107//75  109/74   1100   MAP (mmHg)  83-96  87   1100   SpO2 (%) 75 -100 55 -99  90    1201   Intake/Output    Report   View Table   Net  +320mL  (320 mL / 56.5 kg = 5.7 mL/kg)   07 -  0659  [id number]/[id number]  900  200  550  120  -350  +320  550  (9.7)  320  (5.7)  900  1 x  --  P.O.  Other  IV Piggyback  In  Out  Other  Net  Stool Occurrence  Report   Scheduled     Medication Dose/Rate, Route, Frequency Last Action   aspirin EC tablet 81 mg 81 mg, Oral, Daily Given: 806   chlorhexidine 0.12 % solution 15 mL 15 mL, MT, BID Given: 806   diltiaZEM tablet 30 mg 30 mg, Oral, Q8H Given:  1419   epoetin alfonzo-epbx injection 2,800 Units 2,800 Units, IV, Every Mon, Wed, Fri Given:  1256   heparin (porcine) injection 5,000 Units 5,000 Units, SubQ, Q8H Given: 806   lacosamide (VIMPAT) 50 mg in sodium chloride 0.9% 100 mL IVPB 50 mg, IV, Q12H New Ba/17 1311   levETIRAcetam (KEPPRA) 750 mg in dextrose 5 % 100 mL IVPB 750 mg, IV, Daily Ordered   mupirocin 2 % ointment No Dose/Rate, Nasl, BID Given: 806   piperacillin-tazobactam 4.5 g in dextrose 5 % 100 mL IVPB (ready to mix system) 0 g, IV, Q12H Stopped:  0955   sodium thiosulfate 12.5 gram/50 mL (250 mg/mL) injection 12.5 g 12.5 g, IV, Every Mon, Wed, Fri New Ba/16 1151   valproate (DEPACON) 250 mg in dextrose 5 % 50 mL IVPB 250 mg, IV, Q24H New Ba/17 1142   valproate (DEPACON) 500 mg in dextrose 5 % 50 mL IVPB 500 mg, IV, Q24H New Ba/17 0253      PRN     Medication Dose/Rate, Route, Frequency Last Action   0.9%  NaCl  infusion No Dose/Rate, IV, PRN Ordered   dextrose 50% injection 12.5 g 12.5 g, IV, PRN Given: 08/17 0817   dicyclomine capsule 10 mg 10 mg, Oral, QID PRN Ordered   glucagon (human recombinant) injection 1 mg 1 mg, IM, PRN Ordered   insulin aspart U-100 pen 0-5 Units 0-5 Units, SubQ, Q6H PRN Ordered   levETIRAcetam (KEPPRA) 500 mg in dextrose 5 % 100 mL IVPB 500 mg, IV, Daily PRN Ordered   metoprolol injection 5 mg 5 mg, IV, Q6H PRN Given: 08/12 1658   ondansetron injection 4 mg 4 mg, IV, Q4H PRN Given: 08/15        Selected Labs    Report   (Up to last 2 results from the past 72 hours)   Switch View    08/16 0822 08/16 1355 08/17 0609   Sodium 137     --     138       Potassium 3.6     --     3.2Low        Chloride 95     --     92Low        CO2 19Low      --     28       BUN, Bld 11     --     8       Creatinine 5.8High      --     4.0High        Glucose --     48Low Panic      57Low        Magnesium --     --     1.8       WBC 4.03     --     --       Hemoglobin 8.8Low      --     --       Hematocrit 26.6Low      --     --       Platelets 123Low      --     --       Phosphorus --     --     --        08/15 0400 08/16 0746    Sodium --     --        Potassium --     --        Chloride --     --                  A/P:    1 ESRD (HD-MWF via R UE AV Fistula)-   Stable on HD yesterday  UF as tolerated     2. HYPOKALEMIA  Recurrent issue for her  Address with dialysate weekly  For now, f/u AM and will address possible replacement IV or PO as HD not planned for AM    3. HTN-  Acceptable control    -continue current anti-HTN drug regimen (GIVE BP MEDS POST HD on HD Days) +/- HD-associated UF     4. ANEMIA- clinically stable at present;  DONNY with HD TIW     5. SECONDARY HYPERPARATHYROIDISM (sHPT)-  With calciphylaxis  - clinically stable on current therapy (Na thiosulfate 12.5 gm x last 30-60 min of HD); no active issues    -PO4 controlled off binders for now  F/u iCa     6. ALTERED MENTAL STATUS-   Confusion seems better  as I am familiar with her  Neuro assisting

## 2019-08-17 NOTE — PLAN OF CARE
08/16/19 1923   PRE-TX-O2   O2 Device (Oxygen Therapy) room air   SpO2 (!) 94 %   Pulse Oximetry Type Continuous   Pulse 94   Resp 17

## 2019-08-18 LAB
GLUCOSE SERPL-MCNC: 103 MG/DL (ref 70–110)
GLUCOSE SERPL-MCNC: 68 MG/DL (ref 70–110)
GLUCOSE SERPL-MCNC: 83 MG/DL (ref 70–110)
GLUCOSE SERPL-MCNC: 92 MG/DL (ref 70–110)
VANCOMYCIN SERPL-MCNC: 16.6 UG/ML

## 2019-08-18 PROCEDURE — 25000003 PHARM REV CODE 250: Performed by: NURSE PRACTITIONER

## 2019-08-18 PROCEDURE — 63600175 PHARM REV CODE 636 W HCPCS: Performed by: INTERNAL MEDICINE

## 2019-08-18 PROCEDURE — 63600175 PHARM REV CODE 636 W HCPCS: Performed by: NURSE PRACTITIONER

## 2019-08-18 PROCEDURE — 82962 GLUCOSE BLOOD TEST: CPT

## 2019-08-18 PROCEDURE — 21400001 HC TELEMETRY ROOM

## 2019-08-18 PROCEDURE — 80202 ASSAY OF VANCOMYCIN: CPT

## 2019-08-18 PROCEDURE — 25000003 PHARM REV CODE 250: Performed by: INTERNAL MEDICINE

## 2019-08-18 PROCEDURE — C9254 INJECTION, LACOSAMIDE: HCPCS | Performed by: NURSE PRACTITIONER

## 2019-08-18 RX ORDER — DILTIAZEM HYDROCHLORIDE 60 MG/1
60 TABLET, FILM COATED ORAL EVERY 8 HOURS
Status: DISCONTINUED | OUTPATIENT
Start: 2019-08-18 | End: 2019-08-19 | Stop reason: HOSPADM

## 2019-08-18 RX ORDER — POTASSIUM CHLORIDE 20 MEQ/1
40 TABLET, EXTENDED RELEASE ORAL ONCE
Status: DISCONTINUED | OUTPATIENT
Start: 2019-08-18 | End: 2019-08-19 | Stop reason: HOSPADM

## 2019-08-18 RX ORDER — POTASSIUM CHLORIDE 7.45 MG/ML
10 INJECTION INTRAVENOUS ONCE
Status: COMPLETED | OUTPATIENT
Start: 2019-08-18 | End: 2019-08-18

## 2019-08-18 RX ADMIN — DEXTROSE MONOHYDRATE 250 MG: 50 INJECTION, SOLUTION INTRAVENOUS at 02:08

## 2019-08-18 RX ADMIN — DILTIAZEM HYDROCHLORIDE 60 MG: 60 TABLET, FILM COATED ORAL at 09:08

## 2019-08-18 RX ADMIN — SODIUM CHLORIDE 50 MG: 9 INJECTION, SOLUTION INTRAVENOUS at 12:08

## 2019-08-18 RX ADMIN — LEVETIRACETAM 750 MG: 100 INJECTION, SOLUTION, CONCENTRATE INTRAVENOUS at 11:08

## 2019-08-18 RX ADMIN — DEXTROSE MONOHYDRATE: 10 INJECTION, SOLUTION INTRAVENOUS at 06:08

## 2019-08-18 RX ADMIN — POTASSIUM CHLORIDE 10 MEQ: 7.46 INJECTION, SOLUTION INTRAVENOUS at 04:08

## 2019-08-18 RX ADMIN — DEXTROSE MONOHYDRATE 500 MG: 50 INJECTION, SOLUTION INTRAVENOUS at 12:08

## 2019-08-18 RX ADMIN — ASPIRIN 81 MG: 81 TABLET, COATED ORAL at 09:08

## 2019-08-18 RX ADMIN — PIPERACILLIN AND TAZOBACTAM 4.5 G: 4; .5 INJECTION, POWDER, LYOPHILIZED, FOR SOLUTION INTRAVENOUS; PARENTERAL at 09:08

## 2019-08-18 RX ADMIN — DILTIAZEM HYDROCHLORIDE 30 MG: 30 TABLET, FILM COATED ORAL at 03:08

## 2019-08-18 RX ADMIN — DILTIAZEM HYDROCHLORIDE 30 MG: 30 TABLET, FILM COATED ORAL at 09:08

## 2019-08-18 NOTE — PROGRESS NOTES
[facility]  Cardiology  Progress Note    Patient Name: Griselda Neely  MRN: [id number]  Admission Date: 8/6/2019  Hospital Length of Stay: 12 days  Code Status: Full Code   Attending Physician: Amos Snyder MD   Primary Care Physician: Kalie Neely MD  Expected Discharge Date:   Principal Problem:Encephalopathy, toxic    Subjective:       Interval History:     Patient was transferred out of ICU yesterday. Her heart rate and BP have stabilized. She states she ate breakfast and sat up in the  Chair earlier. Seems to be returning to her baseline. Glucose levels improved.     ROS:  No significant headaches or sore throat or runny nose.   No recent changes in vision.   No recent changes in hearing.  No dysphagia or odynophagia.  Denies chest pain and shortness of breath at baseline.   Denies any cough or hemoptysis.   Denies any abdominal pain, nausea, vomiting, diarrhea or constipation.   Denies any dysuria or polyuria.   Denies any fevers or chills.   Denies any recent significant weight changes.   Denies bleeding diathesis       Cardiology:  Atrial fibrillation, Controlled rates.    Objective:     Vital Signs (Most Recent):  Temp: 98.5 °F (36.9 °C) (08/18/19 1046)  Pulse: 67 (08/18/19 1046)  Resp: 20 (08/18/19 1046)  BP: (!) 132/57 (08/18/19 1046)  SpO2: 95 % (08/18/19 1046) Vital Signs (24h Range):  Temp:  [97.8 °F (36.6 °C)-98.5 °F (36.9 °C)] 98.5 °F (36.9 °C)  Pulse:  [60-98] 67  Resp:  [18-22] 20  SpO2:  [95 %-100 %] 95 %  BP: (130-143)/(52-97) 132/57     Weight: 53.5 kg (117 lb 15.1 oz)  Body mass index is 19.63 kg/m².    SpO2: 95 %  O2 Device (Oxygen Therapy): room air      Intake/Output Summary (Last 24 hours) at 8/18/2019 1341  Last data filed at 8/18/2019 0800  Gross per 24 hour   Intake 480 ml   Output --   Net 480 ml       Lines/Drains/Airways     Central Venous Catheter Line                 Percutaneous Central Line Insertion/Assessment - triple lumen  08/12/19 1921 5 days           Drain                 Hemodialysis AV Fistula 08/08/19 0214 Right upper arm 10 days          Peripheral Intravenous Line                 Midline Catheter Insertion/Assessment  - Single Lumen 08/12/19 1700 Left median cubital vein (antecubital fossa) 5 days                Scheduled Meds:   aspirin  81 mg Oral Daily    diltiaZEM  30 mg Oral Q8H    [START ON 8/19/2019] epoetin alfonzo-ebpx (RETACRIT) injection  100 Units/kg Intravenous Every Mon, Wed, Fri    heparin (porcine)  5,000 Units Subcutaneous Q8H    lacosamide (VIMPAT) IVPB  50 mg Intravenous Q12H    levetiracetam IVPB  750 mg Intravenous Daily    piperacillin-tazobactam (ZOSYN) IVPB  4.5 g Intravenous Q12H    potassium chloride in water  10 mEq Intravenous Once    potassium chloride  40 mEq Oral Once    sodium thiosulfate  12.5 g Intravenous Every Mon, Wed, Fri    valproate sodium (DEPACON) IVPB  250 mg Intravenous Q24H    valproate sodium (DEPACON) IVPB  500 mg Intravenous Q24H     Continuous Infusions:   dextrose 10 % in water (D10W) 50 mL/hr at 08/17/19 2032     PRN Meds:.sodium chloride 0.9%, dextrose 50%, dicyclomine, glucagon (human recombinant), insulin aspart U-100, levetiracetam IVPB, metoprolol, ondansetron, piperacillin-tazobactam (ZOSYN) IVPB, sodium chloride 0.9%     Physical Exam     HEENT: Normocephalic, atraumatic, PERRL, Conjunctiva pink, no scleral icterus.   CVS: S1S2+, Irregular, + SM, rubs or gallops, JVP: Normal.  LUNGS: Clear, diminished in the bases.   ABDOMEN: Soft, NT, BS+  EXTREMITIES: No cyanosis, clubbing or edema. Left upper arm fistula + thrill + bruit. Left thigh calciphylaxis ulcer with dressing CDI.   NEURO: Alert and oriented.         Significant Labs:   Blood Culture:   No results for input(s): LABBLOO in the last 48 hours., BMP:   Recent Labs   Lab 08/16/19  1355 08/17/19  0609   GLU 48* 57*   NA  --  138   K  --  3.2*   CL  --  92*   CO2  --  28   BUN  --  8   CREATININE  --  4.0*   CALCIUM  --  7.3*   MG  --   1.8   , CMP   Recent Labs   Lab 08/16/19  1355 08/17/19  0609   NA  --  138   K  --  3.2*   CL  --  92*   CO2  --  28   GLU 48* 57*   BUN  --  8   CREATININE  --  4.0*   CALCIUM  --  7.3*   ANIONGAP  --  18*   ESTGFRAFRICA  --  12.2*   EGFRNONAA  --  10.6*   , CBC   No results for input(s): WBC, HGB, HCT, PLT in the last 48 hours., INR   No results for input(s): INR, PROTIME in the last 48 hours., Lipid Panel   No results for input(s): CHOL, HDL, LDLCALC, TRIG, CHOLHDL in the last 48 hours.,   Pathology Results  (Last 10 years)    None          Significant Imaging:  I have reviewed the imaging all significant imaging for the last 24 hours.      Assessment and Plan:     IMPRESSION:  1. Altered mental status. Secondary to seizure activity vs hypoglyemia. Head CT negative. EEG consistent with encephalopathy, negative for seizure activity. Waxing and waning symptoms. Etiology? Neurology on board. EEG from yesterday without seizure activity, improving. Mental status improving.   2. Congestive heart failure. Acute on chronic. LVEF ~40-45% on last echo 7/2019. Euvolemic on exam.   3. Hypoglycemia. Etiology likely poor oral intake. Endocrine work up in progress. D 20 currently off. Blood glucoses labile. Glucose levels more stable. C peptide level normal. Insulin level 2.1 (low).   4. Calciphlyaxis. Left leg ulcer. Undergoing wound care treatments.   5. Atrial fibrillation. Valvular. Persistent. Patient does not wish to be on any oral anticoagulation or to be cardioverted. Currently rate controlled.   6. Hypertension. Better controlled at present. Elevates with agitation. Periods of hypotension. Seems better.   7. s/p MVR via right thoracotomy approach on 3/18/19 with Dr. Lora. Functioning appropriately per last echo.   8. Mildly elevated troponins in the presence of ESRD. Chronic. Not consistent with ACS.   9. CAD. Nonobstructive on [facility] done on 3/13/2019.   10. End-stage renal disease on hemodialysis MWF. Anuric.  Followed by Dr. Ingram.   11. Hypocalcemia. Nephrology on board and managing.   12. Hypokalemia.  Ongoing. Replacement ordered.   13. Hyperlipidemia  14. Chronic tobacco abuse.  15. COPD on home O2 at 2L  16. Reported history of acute pancreatitis. Amylase, Lipase WNL.   17. Anemia. Acute on chronic.     PLAN:  1. Repeat labs in AM.  2. If patient remains stable today and she eats more with adequate glucose levels, could possibly be discharged tomorrow.     Ramila Bloom NP  Cardiology  [facility]      I have personally seen and examined the patient. I reviewed the notes, assessments, and/or procedures performed by Ms Ramila Bloom, I concur with her documentation of Griselda Neely.

## 2019-08-18 NOTE — PLAN OF CARE
Problem: Fall Injury Risk  Goal: Absence of Fall and Fall-Related Injury    Intervention: Promote Injury-Free Environment     08/16/19 1901 08/18/19 1700   Optimize Balance and Safe Activity   Safety Promotion/Fall Prevention  --  assistive device/personal item within reach;bed alarm refused;Fall Risk reviewed with patient/family;side rails raised x 2;instructed to call staff for mobility   Optimize Garza and Functional Mobility   Environmental Safety Modification assistive device/personal items within reach;room near unit station  --

## 2019-08-18 NOTE — PROGRESS NOTES
[facility] Medicine  Progress Note    Patient Name: Griselda Neely  MRN: [id number]  Patient Class: IP- Inpatient   Admission Date: 8/6/2019  Length of Stay: 12 days  Attending Physician: Amos Snyder MD  Primary Care Provider: Kalie Neely MD        Subjective:     Principal Problem:Encephalopathy, toxic    Interval History:  Seen and examined, the patient seems does not want to be bothered, however she is awake oriented.  She started to eat other acute events last she will likely need placement, she has been referred to case discussed and family tomorrow    Review of Systems  Objective:     Vital Signs (Most Recent):  Temp: 98.5 °F (36.9 °C) (08/18/19 1046)  Pulse: 67 (08/18/19 1046)  Resp: 20 (08/18/19 1046)  BP: (!) 132/57 (08/18/19 1046)  SpO2: 95 % (08/18/19 1046) Vital Signs (24h Range):  Temp:  [97.8 °F (36.6 °C)-98.5 °F (36.9 °C)] 98.5 °F (36.9 °C)  Pulse:  [60-98] 67  Resp:  [20-22] 20  SpO2:  [95 %-100 %] 95 %  BP: (130-143)/(52-97) 132/57     Weight: 53.5 kg (117 lb 15.1 oz)  Body mass index is 19.63 kg/m².    Intake/Output Summary (Last 24 hours) at 8/18/2019 1510  Last data filed at 8/18/2019 0800  Gross per 24 hour   Intake 480 ml   Output --   Net 480 ml      Physical Exam  General, no distress, awake alert, however she then about 1 does not want to be bothered  Lungs, clear to auscultation bilaterally  Heart regular rhythm and rate,  Abdomen soft, nontender nondistended  Extremities, she does have a decubital ulcer posterior right thigh, also decubitus ulcer sacral        Significant Labs:   BMP:   Recent Labs   Lab 08/17/19  0609   GLU 57*      K 3.2*   CL 92*   CO2 28   BUN 8   CREATININE 4.0*   CALCIUM 7.3*   MG 1.8     CBC: No results for input(s): WBC, HGB, HCT, PLT in the last 48 hours.      Assessment/Plan:      Active Diagnoses:    Diagnosis Date Noted POA    Hypoglycemia, unspecified [E16.2] 08/15/2019 Unknown    Altered mental status [R41.82]  "08/12/2019 Yes    Encephalomalacia on imaging study [G93.89] 08/06/2019 Yes    Pneumonia due to infectious organism [J18.9] 08/06/2019 Yes    ESRD (end stage renal disease) [N18.6] 05/11/2016 Yes      Problems Resolved During this Admission:    Diagnosis Date Noted Date Resolved POA    PRINCIPAL PROBLEM:  Encephalopathy, metabolic  [G92] 08/06/2019 08/09/2019 Yes    Disorientation [R41.0] 08/06/2019 08/09/2019 Yes    Hypoglycemia [E16.2] 08/06/2019 08/09/2019 Yes    Elevated lactic acid level [R79.89] 08/06/2019 08/09/2019 Yes    Altered mental status [R41.82] 08/06/2019 08/09/2019 Yes    Atrial fibrillation with RVR [I48.91] 07/18/2017 08/09/2019 Yes     VTE Risk Mitigation (From admission, onward)        Ordered     heparin (porcine) injection 5,000 Units  Every 8 hours      08/06/19 2001     Place ROSANNA hose  Until discontinued      08/06/19 2001     IP VTE HIGH RISK PATIENT  Once      08/06/19 2001           Encephalopathy, metabolic   AMS  - waxing and waning, this seems to be improving  She seems more confused today, but when attempt to physical examine, for decubitus sacral ulcer, she states "don't do that"   This is likely multifactorial   - likely metabolic +/- uremia +/- infectious process  +/- hypoglycemia +/- medications induced.  - likely poor prognosis, considering recurrency  - neurology following -   - continue keppra. Vimpat redosed per neurology. Case discussed with nephrology, appreciate input  - move to icu for closer monitoring and continuous EEG per Dr. Ramses Florentino, need to r/o non-convulsive status given hx of seizure d/o   - if she continues to improve, possible transfer tomorrow to telemetry     Hypoglycemia  - poor po intake due to AMS  - off D10 at 50 cc/hr, as per endocrinology frequent accuchecks   - continue to hold insulin, hold any po antihypoglycemics   - endocrinology consulted appreciate input  - she restarted to eat     Failure to thrive  - poor prognosis, if she does not " start eating  - family informed about GT, they will consider it; however, today she started to eat     Diabetes  - ISSS, continue hold PO meds     Pneumonia due to infectious organism  On admit CXR showed persistence of predominantly ground-glass type pulmonary opacities in the right mid and lower lung zone,   -Continue zosyn     HTN,   - continue to  hold po agents for now with labile pressures      Atrial fibrillation with RVR  Patient with PAF on admit telemetry showed a-fib rvr, started on cardizem gtt in ED  -Continuous cardiac monitoring   -cardizem gtt if bp will tolerate     ESRD (end stage renal disease)  Nephrology consulted for inpatient HD      Amos Snyder MD  Department of Hospital Medicine   [facility]

## 2019-08-18 NOTE — PLAN OF CARE
Problem: Diabetes Comorbidity  Goal: Blood Glucose Level Within Desired Range    Intervention: Maintain Glycemic Control     08/18/19 1722   Monitor and Manage Ketoacidosis   Glycemic Management blood glucose monitoring

## 2019-08-18 NOTE — PLAN OF CARE
Problem: Adult Inpatient Plan of Care  Goal: Plan of Care Review  Outcome: Ongoing (interventions implemented as appropriate)     08/18/19 0536   Plan of Care Review   Plan of Care Reviewed With patient   Progress no change       Problem: Fall Injury Risk  Goal: Absence of Fall and Fall-Related Injury  Outcome: Ongoing (interventions implemented as appropriate)  Patient remained free of fall-related injury throughout shift

## 2019-08-19 VITALS
DIASTOLIC BLOOD PRESSURE: 84 MMHG | WEIGHT: 129.88 LBS | TEMPERATURE: 98 F | SYSTOLIC BLOOD PRESSURE: 171 MMHG | HEART RATE: 52 BPM | BODY MASS INDEX: 21.64 KG/M2 | HEIGHT: 65 IN | OXYGEN SATURATION: 100 % | RESPIRATION RATE: 20 BRPM

## 2019-08-19 LAB
ALBUMIN SERPL BCP-MCNC: 2.4 G/DL (ref 3.5–5.2)
ALP SERPL-CCNC: 50 U/L (ref 55–135)
ALT SERPL W/O P-5'-P-CCNC: 7 U/L (ref 10–44)
ANION GAP SERPL CALC-SCNC: 19 MMOL/L (ref 8–16)
AST SERPL-CCNC: 14 U/L (ref 10–40)
BASOPHILS # BLD AUTO: 0.02 K/UL (ref 0–0.2)
BASOPHILS NFR BLD: 0.4 % (ref 0–1.9)
BILIRUB SERPL-MCNC: 0.8 MG/DL (ref 0.1–1)
BUN SERPL-MCNC: 16 MG/DL (ref 8–23)
CALCIUM SERPL-MCNC: 7.4 MG/DL (ref 8.7–10.5)
CHLORIDE SERPL-SCNC: 91 MMOL/L (ref 95–110)
CO2 SERPL-SCNC: 22 MMOL/L (ref 23–29)
CREAT SERPL-MCNC: 5.7 MG/DL (ref 0.5–1.4)
DIFFERENTIAL METHOD: ABNORMAL
EOSINOPHIL # BLD AUTO: 0.3 K/UL (ref 0–0.5)
EOSINOPHIL NFR BLD: 7.3 % (ref 0–8)
ERYTHROCYTE [DISTWIDTH] IN BLOOD BY AUTOMATED COUNT: 23.3 % (ref 11.5–14.5)
EST. GFR  (AFRICAN AMERICAN): 7.9 ML/MIN/1.73 M^2
EST. GFR  (NON AFRICAN AMERICAN): 6.9 ML/MIN/1.73 M^2
GLUCOSE SERPL-MCNC: 59 MG/DL (ref 70–110)
GLUCOSE SERPL-MCNC: 75 MG/DL (ref 70–110)
GLUCOSE SERPL-MCNC: 78 MG/DL (ref 70–110)
HCT VFR BLD AUTO: 23.6 % (ref 37–48.5)
HGB BLD-MCNC: 8.1 G/DL (ref 12–16)
IMM GRANULOCYTES # BLD AUTO: 0.01 K/UL (ref 0–0.04)
IMM GRANULOCYTES NFR BLD AUTO: 0.2 % (ref 0–0.5)
LYMPHOCYTES # BLD AUTO: 1.1 K/UL (ref 1–4.8)
LYMPHOCYTES NFR BLD: 23.9 % (ref 18–48)
MAGNESIUM SERPL-MCNC: 1.7 MG/DL (ref 1.6–2.6)
MCH RBC QN AUTO: 26.7 PG (ref 27–31)
MCHC RBC AUTO-ENTMCNC: 34.3 G/DL (ref 32–36)
MCV RBC AUTO: 78 FL (ref 82–98)
MONOCYTES # BLD AUTO: 0.5 K/UL (ref 0.3–1)
MONOCYTES NFR BLD: 10.4 % (ref 4–15)
NEUTROPHILS # BLD AUTO: 2.6 K/UL (ref 1.8–7.7)
NEUTROPHILS NFR BLD: 57.8 % (ref 38–73)
NRBC BLD-RTO: 0 /100 WBC
PHOSPHATE SERPL-MCNC: 2.6 MG/DL (ref 2.7–4.5)
PLATELET # BLD AUTO: 134 K/UL (ref 150–350)
PMV BLD AUTO: ABNORMAL FL (ref 9.2–12.9)
POTASSIUM SERPL-SCNC: 3.3 MMOL/L (ref 3.5–5.1)
PROT SERPL-MCNC: 5.8 G/DL (ref 6–8.4)
RBC # BLD AUTO: 3.03 M/UL (ref 4–5.4)
SODIUM SERPL-SCNC: 132 MMOL/L (ref 136–145)
VALPROATE SERPL-MCNC: 67.3 UG/ML (ref 50–100)
WBC # BLD AUTO: 4.51 K/UL (ref 3.9–12.7)

## 2019-08-19 PROCEDURE — 63600175 PHARM REV CODE 636 W HCPCS: Performed by: INTERNAL MEDICINE

## 2019-08-19 PROCEDURE — 85025 COMPLETE CBC W/AUTO DIFF WBC: CPT

## 2019-08-19 PROCEDURE — C9254 INJECTION, LACOSAMIDE: HCPCS | Performed by: NURSE PRACTITIONER

## 2019-08-19 PROCEDURE — 80053 COMPREHEN METABOLIC PANEL: CPT

## 2019-08-19 PROCEDURE — 90935 HEMODIALYSIS ONE EVALUATION: CPT

## 2019-08-19 PROCEDURE — 80164 ASSAY DIPROPYLACETIC ACD TOT: CPT

## 2019-08-19 PROCEDURE — 84100 ASSAY OF PHOSPHORUS: CPT

## 2019-08-19 PROCEDURE — 25000003 PHARM REV CODE 250: Performed by: INTERNAL MEDICINE

## 2019-08-19 PROCEDURE — 25000003 PHARM REV CODE 250: Performed by: NURSE PRACTITIONER

## 2019-08-19 PROCEDURE — 63600175 PHARM REV CODE 636 W HCPCS: Performed by: NURSE PRACTITIONER

## 2019-08-19 PROCEDURE — 83735 ASSAY OF MAGNESIUM: CPT

## 2019-08-19 RX ORDER — ALBUMIN HUMAN 50 G/1000ML
25 SOLUTION INTRAVENOUS
Status: DISCONTINUED | OUTPATIENT
Start: 2019-08-19 | End: 2019-08-19 | Stop reason: HOSPADM

## 2019-08-19 RX ORDER — DILTIAZEM HYDROCHLORIDE 60 MG/1
60 TABLET, FILM COATED ORAL EVERY 8 HOURS
Qty: 90 TABLET | Refills: 11 | Status: ON HOLD | OUTPATIENT
Start: 2019-08-19 | End: 2019-09-12 | Stop reason: HOSPADM

## 2019-08-19 RX ADMIN — HEPARIN SODIUM 5000 UNITS: 5000 INJECTION INTRAVENOUS; SUBCUTANEOUS at 03:08

## 2019-08-19 RX ADMIN — LEVETIRACETAM 750 MG: 100 INJECTION, SOLUTION, CONCENTRATE INTRAVENOUS at 03:08

## 2019-08-19 RX ADMIN — SODIUM THIOSULFATE 12.5 G: 250 INJECTION, SOLUTION INTRAVENOUS at 11:08

## 2019-08-19 RX ADMIN — SODIUM CHLORIDE 50 MG: 9 INJECTION, SOLUTION INTRAVENOUS at 12:08

## 2019-08-19 RX ADMIN — DEXTROSE MONOHYDRATE 500 MG: 50 INJECTION, SOLUTION INTRAVENOUS at 12:08

## 2019-08-19 RX ADMIN — DEXTROSE MONOHYDRATE 250 MG: 50 INJECTION, SOLUTION INTRAVENOUS at 03:08

## 2019-08-19 RX ADMIN — DILTIAZEM HYDROCHLORIDE 60 MG: 60 TABLET, FILM COATED ORAL at 04:08

## 2019-08-19 RX ADMIN — EPOETIN ALFA-EPBX 5500 UNITS: 10000 INJECTION, SOLUTION INTRAVENOUS; SUBCUTANEOUS at 11:08

## 2019-08-19 RX ADMIN — PIPERACILLIN AND TAZOBACTAM 4.5 G: 4; .5 INJECTION, POWDER, LYOPHILIZED, FOR SOLUTION INTRAVENOUS; PARENTERAL at 04:08

## 2019-08-19 RX ADMIN — HEPARIN SODIUM 5000 UNITS: 5000 INJECTION INTRAVENOUS; SUBCUTANEOUS at 01:08

## 2019-08-19 RX ADMIN — ASPIRIN 81 MG: 81 TABLET, COATED ORAL at 03:08

## 2019-08-19 NOTE — PROGRESS NOTES
[facility]  Cardiology  Progress Note    Patient Name: Griselda Neely  MRN: [id number]  Admission Date: 8/6/2019  Hospital Length of Stay: 13 days  Code Status: Full Code   Attending Physician: Amos Snyder MD   Primary Care Physician: Kalie Neely MD  Expected Discharge Date:   Principal Problem:Encephalopathy, toxic    Subjective:       Interval History:     Denies any pain. Ambulated with no problems. Denies any abdominal pain.     ROS:  No significant headaches or sore throat or runny nose.   No recent changes in vision.   No recent changes in hearing.  No dysphagia or odynophagia.  Denies chest pain and shortness of breath at baseline.   Denies any cough or hemoptysis.   Denies any abdominal pain, nausea, vomiting, diarrhea or constipation.   Denies any dysuria or polyuria.   Denies any fevers or chills.   Denies any recent significant weight changes.   Denies bleeding diathesis       Cardiology:  Atrial fibrillation, Controlled rates.    Objective:     Vital Signs (Most Recent):  Temp: 96.8 °F (36 °C) (08/19/19 0834)  Pulse: (!) 50 (08/19/19 1145)  Resp: 20 (08/19/19 0834)  BP: (!) 190/100 (08/19/19 1145)  SpO2: 100 % (08/19/19 0822) Vital Signs (24h Range):  Temp:  [96.7 °F (35.9 °C)-98.4 °F (36.9 °C)] 96.8 °F (36 °C)  Pulse:  [] 50  Resp:  [18-20] 20  SpO2:  [97 %-100 %] 100 %  BP: ()/() 190/100     Weight: 58.9 kg (129 lb 13.6 oz)  Body mass index is 21.61 kg/m².    SpO2: 100 %  O2 Device (Oxygen Therapy): room air    No intake or output data in the 24 hours ending 08/19/19 1355    Lines/Drains/Airways     Central Venous Catheter Line                 Percutaneous Central Line Insertion/Assessment - triple lumen  08/12/19 1921 6 days          Drain                 Hemodialysis AV Fistula 08/08/19 0214 Right upper arm 11 days          Peripheral Intravenous Line                 Midline Catheter Insertion/Assessment  - Single Lumen 08/12/19 1700 Left median cubital  vein (antecubital fossa) 6 days                Scheduled Meds:   aspirin  81 mg Oral Daily    diltiaZEM  60 mg Oral Q8H    epoetin alfonzo-ebpx (RETACRIT) injection  100 Units/kg Intravenous Every Mon, Wed, Fri    heparin (porcine)  5,000 Units Subcutaneous Q8H    lacosamide (VIMPAT) IVPB  50 mg Intravenous Q12H    levetiracetam IVPB  750 mg Intravenous Daily    piperacillin-tazobactam (ZOSYN) IVPB  4.5 g Intravenous Q12H    potassium chloride  40 mEq Oral Once    sodium thiosulfate  12.5 g Intravenous Every Mon, Wed, Fri    valproate sodium (DEPACON) IVPB  250 mg Intravenous Q24H    valproate sodium (DEPACON) IVPB  500 mg Intravenous Q24H     Continuous Infusions:    PRN Meds:.sodium chloride 0.9%, albumin human 5%, dextrose 50%, dicyclomine, glucagon (human recombinant), insulin aspart U-100, levetiracetam IVPB, metoprolol, ondansetron, piperacillin-tazobactam (ZOSYN) IVPB, sodium chloride 0.9%     Physical Exam     HEENT: Normocephalic, atraumatic, PERRL, Conjunctiva pink, no scleral icterus.   CVS: S1S2+, Irregular, + SM, rubs or gallops, JVP: Normal.  LUNGS: Clear, diminished in the bases.   ABDOMEN: Soft, NT, BS+  EXTREMITIES: No cyanosis, clubbing or edema. Left upper arm fistula + thrill + bruit. Left thigh calciphylaxis ulcer with dressing CDI.   NEURO: Alert and oriented.         Significant Labs:   Blood Culture:   No results for input(s): LABBLOO in the last 48 hours., BMP:   Recent Labs   Lab 08/19/19  0415   GLU 78   *   K 3.3*   CL 91*   CO2 22*   BUN 16   CREATININE 5.7*   CALCIUM 7.4*   MG 1.7   , CMP   Recent Labs   Lab 08/19/19  0415   *   K 3.3*   CL 91*   CO2 22*   GLU 78   BUN 16   CREATININE 5.7*   CALCIUM 7.4*   PROT 5.8*   ALBUMIN 2.4*   BILITOT 0.8   ALKPHOS 50*   AST 14   ALT 7*   ANIONGAP 19*   ESTGFRAFRICA 7.9*   EGFRNONAA 6.9*   , CBC   Recent Labs   Lab 08/19/19  0411   WBC 4.51   HGB 8.1*   HCT 23.6*   *   , INR   No results for input(s): INR, PROTIME in the  last 48 hours., Lipid Panel   No results for input(s): CHOL, HDL, LDLCALC, TRIG, CHOLHDL in the last 48 hours.,   Pathology Results  (Last 10 years)    None          Significant Imaging:  I have reviewed the imaging all significant imaging for the last 24 hours.      Assessment and Plan:     IMPRESSION:  1. Altered mental status. Secondary to seizure activity vs hypoglyemia. Head CT negative. EEG consistent with encephalopathy, negative for seizure activity. Waxing and waning symptoms. Etiology? Neurology on board. EEG from yesterday without seizure activity, improving. Mental status improving.   2. Congestive heart failure. Acute on chronic. LVEF ~40-45% on last echo 7/2019. Euvolemic on exam.   3. Hypoglycemia. Etiology likely poor oral intake. Endocrine work up in progress. D 20 currently off. Blood glucoses labile. Glucose levels more stable. C peptide level normal. Insulin level 2.1 (low).   4. Calciphlyaxis. Left leg ulcer. Undergoing wound care treatments.   5. Atrial fibrillation. Valvular. Persistent. Patient does not wish to be on any oral anticoagulation or to be cardioverted. Currently rate controlled.   6. Hypertension. Better controlled at present. Elevates with agitation. Periods of hypotension. Seems better.   7. s/p MVR via right thoracotomy approach on 3/18/19 with Dr. Lora. Functioning appropriately per last echo.   8. Mildly elevated troponins in the presence of ESRD. Chronic. Not consistent with ACS.   9. CAD. Nonobstructive on [facility] done on 3/13/2019.   10. End-stage renal disease on hemodialysis MWF. Anuric. Followed by Dr. Ingram.   11. Hypocalcemia. Nephrology on board and managing.   12. Hypokalemia.  Ongoing. Replacement ordered.   13. Hyperlipidemia  14. Chronic tobacco abuse.  15. COPD on home O2 at 2L  16. Reported history of acute pancreatitis. Amylase, Lipase WNL.   17. Anemia. Acute on chronic.     PLAN:  1. Ok to discharge home from Cardiology stand point.   2. Continue cardizem at  the current dose and aspirin.   3. Follow up in clinic in 2 weeks or sooner if needed.     Katharina Powell MD  Cardiology  [facility]

## 2019-08-19 NOTE — PROGRESS NOTES
Tx complete, with no complications throughout. Pt is AAO X4 and stable, with no further needs at this time.  Net UF: 3000 mL  Pt received sodium thiosulfate IVPB, per Nephrology orders; see MAR.

## 2019-08-19 NOTE — PT/OT/SLP PROGRESS
Occupational Therapy      Patient Name:  Griselda Neely   MRN:  [id number]    Patient not seen today secondary to Dialysis. Will follow-up 8/20/19.    Nicole Self OT  8/19/2019

## 2019-08-19 NOTE — PROGRESS NOTES
[facility]  Nephrology  Progress Note    Patient Name: Griselda Neely  MRN: [id number]  Admission Date: 8/6/2019  Hospital Length of Stay: 13 days  Attending Provider: Amos Snyder MD   Primary Care Physician: Kalie Neely MD  Principal Problem:Encephalopathy, toxic      Subjective:     Interval History:  Sleeping comfortably this am; no obvious distress appreciated    Review of patient's allergies indicates:  No Known Allergies  Current Facility-Administered Medications   Medication Frequency    0.9%  NaCl infusion PRN    aspirin EC tablet 81 mg Daily    dextrose 50% injection 12.5 g PRN    dicyclomine capsule 10 mg QID PRN    diltiaZEM tablet 60 mg Q8H    epoetin alfonzo-epbx injection 5,500 Units Every Mon, Wed, Fri    glucagon (human recombinant) injection 1 mg PRN    heparin (porcine) injection 5,000 Units Q8H    insulin aspart U-100 pen 0-5 Units Q6H PRN    lacosamide (VIMPAT) 50 mg in sodium chloride 0.9% 100 mL IVPB Q12H    levETIRAcetam (KEPPRA) 500 mg in dextrose 5 % 100 mL IVPB Daily PRN    levETIRAcetam (KEPPRA) 750 mg in dextrose 5 % 100 mL IVPB Daily    metoprolol injection 5 mg Q6H PRN    ondansetron injection 4 mg Q4H PRN    piperacillin-tazobactam (ZOSYN) 0.75 g in dextrose 5 % 100 mL PRN    piperacillin-tazobactam 4.5 g in dextrose 5 % 100 mL IVPB (ready to mix system) Q12H    potassium chloride SA CR tablet 40 mEq Once    sodium chloride 0.9% flush 10 mL PRN    sodium thiosulfate 12.5 gram/50 mL (250 mg/mL) injection 12.5 g Every Mon, Wed, Fri    valproate (DEPACON) 250 mg in dextrose 5 % 50 mL IVPB Q24H    valproate (DEPACON) 500 mg in dextrose 5 % 50 mL IVPB Q24H       Objective:     Vital Signs (Most Recent):  Temp: 97.8 °F (36.6 °C) (08/19/19 0300)  Pulse: 89 (08/19/19 0300)  Resp: 18 (08/19/19 0300)  BP: (!) 96/54 (08/19/19 0300)  SpO2: 97 % (08/19/19 0300)  O2 Device (Oxygen Therapy): room air (08/18/19 1046) Vital Signs (24h Range):  Temp:  [97.8 °F  (36.6 °C)-98.5 °F (36.9 °C)] 97.8 °F (36.6 °C)  Pulse:  [] 89  Resp:  [18-20] 18  SpO2:  [95 %-97 %] 97 %  BP: ()/(54-59) 96/54     Weight: 58.9 kg (129 lb 13.6 oz) (08/19/19 0527)  Body mass index is 21.61 kg/m².  Body surface area is 1.64 meters squared.    I/O last 3 completed shifts:  In: 800 [P.O.:600; IV Piggyback:200]  Out: -     Physical Exam   Constitutional: No distress.   Chronically ill & somewhat malnourished-appearing   HENT:   Head: Normocephalic and atraumatic.   Mouth/Throat: Oropharynx is clear and moist.   Eyes: Conjunctivae are normal. Right eye exhibits no discharge. Left eye exhibits no discharge. No scleral icterus.   Neck: Normal range of motion. Neck supple. No JVD present.   Cardiovascular: Normal rate. Exam reveals no gallop and no friction rub.   Murmur heard.  Irregularly irregular rhythm   Pulmonary/Chest: Effort normal and breath sounds normal. No respiratory distress. She has no rales.   Abdominal: Soft. She exhibits no distension and no mass. There is no tenderness.   Hypoactive BS in all quadrants   Genitourinary:   Genitourinary Comments: Deferred   Musculoskeletal: Normal range of motion. She exhibits no edema, tenderness or deformity.   R UE AV Fistula w/ palpable thrill   Neurological:   Sleeping comfortably   Skin: Skin is warm and dry. She is not diaphoretic.   B LE Ulcers (Calciphylaxis)   Psychiatric:   Unable to assess   Nursing note and vitals reviewed.      Significant Labs:sure  CBC:   Recent Labs   Lab 08/16/19  0822   WBC 4.03   RBC 3.34*   HGB 8.8*   HCT 26.6*   *   MCV 80*   MCH 26.3*   MCHC 33.1     CMP:   Recent Labs   Lab 08/16/19  0822  08/17/19  0609   GLU 57*   < > 57*   CALCIUM 7.9*  --  7.3*   ALBUMIN 2.5*  --   --    PROT 6.1  --   --      --  138   K 3.6  --  3.2*   CO2 19*  --  28   CL 95  --  92*   BUN 11  --  8   CREATININE 5.8*  --  4.0*   ALKPHOS 56  --   --    ALT 7*  --   --    AST 12  --   --    BILITOT 0.9  --   --     < >  = values in this interval not displayed.       Significant Imaging:  X-Ray: Reviewed  CT: Reviewed    Assessment/Plan:     Active Diagnoses:    Diagnosis Date Noted POA    Hypoglycemia, unspecified [E16.2] 08/15/2019 Unknown    Altered mental status [R41.82] 08/12/2019 Yes    Encephalomalacia on imaging study [G93.89] 08/06/2019 Yes    Pneumonia due to infectious organism [J18.9] 08/06/2019 Yes    ESRD (end stage renal disease) [N18.6] 05/11/2016 Yes      Problems Resolved During this Admission:    Diagnosis Date Noted Date Resolved POA    PRINCIPAL PROBLEM:  Encephalopathy, metabolic  [G92] 08/06/2019 08/09/2019 Yes    Disorientation [R41.0] 08/06/2019 08/09/2019 Yes    Hypoglycemia [E16.2] 08/06/2019 08/09/2019 Yes    Elevated lactic acid level [R79.89] 08/06/2019 08/09/2019 Yes    Altered mental status [R41.82] 08/06/2019 08/09/2019 Yes    Atrial fibrillation with RVR [I48.91] 07/18/2017 08/09/2019 Yes     1 ESRD (HD-MWF via R UE AV Fistula)- clinically stable at present; no overt volume overload, uremic signs/symptoms, electrolyte perturbations nor acid-base disturbance; no active issues    -maintenance HD (duration: 3h; UF Goal: 2-3L as tolerated;  3K & 3-Calcium  'bath'; Access: AVF; Qb: 400 ml/min, Qd: 2x BFR; 12.5 gm of Na thiosulfate over last 30-60 min of treatment; 12.5-25 gm 25% Albumin w/ HD for BP support) per outpatient schedule    -DAILY renal function panel to document sCr trend, optimize electrolyte 'bath' & assess response to therapy    -avoid nephrotoxic agents (NSAIDs, IV contrast dye, MRI w/ Gadolinium constrast)    -renally dose all appropriate medications, including antibiotics    2. HYPOKALEMIA (3.2)- clinically stable at present (s/p IV repletion); etiology unclear no active issues    -recommend KCl 'rider' (40 mEq in 0.45% NS to run over 3-4h); 3K Bath during HD     3. HTN- clinically stable at present; BP NOT AT GOAL (161/90); now w/ relative hypotension (96/54);  active  issues    -hold anti-HTN medications for now (GIVE BP MEDS POST HD on HD Days) +/- HD-associated UF     4. ANEMIA- clinically stable at present; H/H MARGINALLY AT GOAL (8.8/26.6); no active issues    -trend DAILY CBC (H/H) to effect optimal blood-loss surveillance     5. SECONDARY HYPERPARATHYROIDISM (sHPT)-- clinically stable on current therapy (Na thiosulfate 12.5 gm x last 30-60 min of HD); no active issues    -continue dietary binder/Vitamin D +/- HD-associated calcimimetic therapy (Cinacalcet vs Etelcalcetide)    6. ALTERED MENTAL STATUS- remains acutely confused & markedly restless this am; etiology likely multifactorial; Neurology service on the case    -management per Neurology/Primary team recommendations    Thank you for your consult. I will follow-up with patient. Please contact us if you have any additional questions.    Dane Martinez III, MD  [facility]  [facility]  [phone] (C)

## 2019-08-19 NOTE — PROGRESS NOTES
[facility]  Neurology  Progress Note    Patient Name: Griselda Neely  MRN: [id number]  Admission Date: 8/6/2019  Hospital Length of Stay: 13 days  Code Status: Full Code   Attending Provider: Amos Snyder MD  Primary Care Physician: Kalie Neely MD   Principal Problem:Encephalopathy, toxic    Subjective:     Interval History: Patient seen and examined in HD today. Oriented to person and place. Cooperative with exam.       Current Neurological Medications: MAR reviewed    HPI: Griselda Neely is a 73 y.o. female with a history as below who presented to the ED from her cardiologist office via  for evaluation of altered mental status changes.  Sister at bedside reports patient has been in [facility] s/p discharge from hospital ~ 3 weeks ago due to encephalopathy. Pt was started on Keppra and Vimpat during that admission. Sister at bedside further states patient stopped eating and drinking on Friday. Further reports patient with ESRD, had dialysis on Wednesday and Friday but was taken off early. Also reports went to HD on Monday.  Per chart review, patient hospitalized In July 2019 and developed acute encephalopathy during that admission, neurology was consulted with extensive work-up and non-acute findings. On admit, labs and imaging reviewed.  Neurology consulted with recs for MRI/MRA and EEG.     CRT: 6.7  K: 3.4  LDL: 55.2  Ammonia: 18  B12: 1045  TSH: 2.7  Depakote level: 67.3 WNL         Brain imaging:  CT head 8/12/19:  1. No acute intracranial abnormality.  2. Chronic small vessel ischemic changes.     CT head:   No acute intracranial abnormality.    EEG 8/7:  This is an abnormal awake and drowsy routine EEG due to diffuse   slowing of the background activity consistent with a mild to   moderate encephalopathy.    EEG 8/12:  Impression:  Moderate encephalopathy with recurrent epileptiform   activity. No Seizures. Recommend LTM and close monitoring.    EEG LTM 8/12: p    EEG  8/14/19: Moderate encephalopathy. No Seizures. Improved from previous study --- final report pending       Current Facility-Administered Medications   Medication Dose Route Frequency Provider Last Rate Last Dose    0.9%  NaCl infusion   Intravenous PRN Dane Martinez MD        albumin human 5% bottle 25 g  25 g Intravenous PRN Dane Martinez MD        aspirin EC tablet 81 mg  81 mg Oral Daily JOSE RushP   81 mg at 08/18/19 0940    dextrose 50% injection 12.5 g  12.5 g Intravenous PRN JOSE RushP   12.5 g at 08/17/19 1624    dicyclomine capsule 10 mg  10 mg Oral QID PRN Hilary La MD        diltiaZEM tablet 60 mg  60 mg Oral Q8H Katharina Powell MD   60 mg at 08/18/19 2159    epoetin alfonzo-epbx injection 5,500 Units  100 Units/kg Intravenous Every Mon, Wed, Fri Steve Gupta MD        glucagon (human recombinant) injection 1 mg  1 mg Intramuscular PRN TODD Rush        heparin (porcine) injection 5,000 Units  5,000 Units Subcutaneous Q8H TODD Rush   5,000 Units at 08/19/19 0100    insulin aspart U-100 pen 0-5 Units  0-5 Units Subcutaneous Q6H PRN TODD Rush        lacosamide (VIMPAT) 50 mg in sodium chloride 0.9% 100 mL IVPB  50 mg Intravenous Q12H Maggie Blood  mL/hr at 08/19/19 0000 50 mg at 08/19/19 0000    levETIRAcetam (KEPPRA) 500 mg in dextrose 5 % 100 mL IVPB  500 mg Intravenous Daily PRN Maggie Blood NP        levETIRAcetam (KEPPRA) 750 mg in dextrose 5 % 100 mL IVPB  750 mg Intravenous Daily Archana Cisneros NP   Stopped at 08/18/19 1205    metoprolol injection 5 mg  5 mg Intravenous Q6H PRN Daren Lea MD   5 mg at 08/12/19 1658    ondansetron injection 4 mg  4 mg Intravenous Q4H PRN Hilary La MD   4 mg at 08/17/19 2033    piperacillin-tazobactam (ZOSYN) 0.75 g in dextrose 5 % 100 mL  0.75 g Intravenous PRN Daren Lea MD        piperacillin-tazobactam 4.5 g in dextrose 5 %  100 mL IVPB (ready to mix system)  4.5 g Intravenous Q12H Daren Lea MD 25 mL/hr at 08/18/19 2158 4.5 g at 08/18/19 2158    potassium chloride SA CR tablet 40 mEq  40 mEq Oral Once Amos Snyder MD        sodium chloride 0.9% flush 10 mL  10 mL Intravenous PRN TODD Rush        sodium thiosulfate 12.5 gram/50 mL (250 mg/mL) injection 12.5 g  12.5 g Intravenous Every Mon, Wed, Fri Thomas John Martinez MD   12.5 g at 08/16/19 1151    valproate (DEPACON) 250 mg in dextrose 5 % 50 mL IVPB  250 mg Intravenous Q24H Maggie Blood, NP 50 mL/hr at 08/18/19 1408 250 mg at 08/18/19 1408    valproate (DEPACON) 500 mg in dextrose 5 % 50 mL IVPB  500 mg Intravenous Q24H Maggie Caitie, NP 50 mL/hr at 08/19/19 0005 500 mg at 08/19/19 0005       Review of Systems   as per HPI  Objective:     Vital Signs (Most Recent):  Temp: 96.7 °F (35.9 °C) (08/19/19 0822)  Pulse: 81 (08/19/19 0822)  Resp: 20 (08/19/19 0822)  BP: (!) 155/71 (08/19/19 0822)  SpO2: 100 % (08/19/19 0822) Vital Signs (24h Range):  Temp:  [96.7 °F (35.9 °C)-98.4 °F (36.9 °C)] 96.7 °F (35.9 °C)  Pulse:  [] 81  Resp:  [18-20] 20  SpO2:  [97 %-100 %] 100 %  BP: ()/(54-71) 155/71     Weight: 58.9 kg (129 lb 13.6 oz)  Body mass index is 21.61 kg/m².    Physical Exam   Constitutional: She appears well-developed.   thin   HENT:   Head: Normocephalic and atraumatic.   Eyes: Pupils are equal, round, and reactive to light. EOM are normal.   Neck: Normal range of motion. Neck supple.   Cardiovascular: Normal rate, regular rhythm and normal heart sounds.   Pulmonary/Chest: Effort normal and breath sounds normal.   Abdominal: Soft.   Musculoskeletal: Normal range of motion.   Neurological: She is alert. She has normal strength. She has a normal Finger-Nose-Finger Test.   Skin: Skin is warm and dry.   Psychiatric: Her speech is normal.       NEUROLOGICAL EXAMINATION:     MENTAL STATUS   Oriented to person.   Oriented to place.   Disoriented to time.    Speech: speech is normal   Level of consciousness: alert       Follows commands  Able to answer in complete sentences  Marked improvement     CRANIAL NERVES   Cranial nerves II through XII intact.     CN III, IV, VI   Pupils are equal, round, and reactive to light.  Extraocular motions are normal.     MOTOR EXAM     Strength   Strength 5/5 throughout.   Right neck flexion: 4/5  Left neck flexion: 4/5  Right neck extension: 4/5  Left neck extension: 4/5  Right deltoid: 4/5  Left deltoid: 4/5  Right biceps: 4/5  Left biceps: 4/5  Right triceps: 4/5  Left triceps: 4/5  Right wrist flexion: 4/5  Left wrist flexion: 4/5  Right wrist extension: 4/5  Left wrist extension: 4/5  Right interossei: 4/5  Left interossei: 4/5  Right abdominals: 4/5  Left abdominals: 4/5  Right iliopsoas: 4/5  Left iliopsoas: 4/5  Right quadriceps: 4/5  Left quadriceps: 4/5  Right hamstrin/5  Left hamstrin/5  Right glutei: 4/5  Left glutei: 4/5  Right anterior tibial: 4/5  Left anterior tibial: 4/5  Right posterior tibial: 4/5  Left posterior tibial: 4/5  Right peroneal: 4/5  Left peroneal: 4/5  Right gastroc: 4/5  Left gastroc: 4/5    SENSORY EXAM   Light touch normal.     GAIT AND COORDINATION      Coordination   Finger to nose coordination: normal      Significant Labs: All pertinent lab results from the past 24 hours have been reviewed.        Assessment and Plan:   Encephalopathy  -Multifactorial & most likely metabolic-toxic r/t dehydration/malnutrition, infection,uremia, hypoglycemia and medication-induced  -EEG: improving  -B12, TSH, Folate, Ammonia WNL  -Previous MRI brain done 2019 negative acute, shows remote ischemia   -Clinically better     History of seizures  -Abnormal EEGs on previous admit and pt started on Vimpat and Keppra  -EEG: no seizures  -Continue Keppra 750mg daily,  additional 500mg after dialysis; level therapeutic   -Continue Vimpat 50mg daily; level subtherapeutic   -Continue depakote 250mg in  the am and 500mg in the pm; level now therapeutic 8/19  - Pt clinically improving, follow      ESRD  -Nephrology following, on HD     Pneumonia   -Management per IM     Hypoglycemia  - defer to HM and endocrine    Hypokalemia  - mgmt per IM, recommend electrolyte maintenance WNL given sz predisposition    Thrombocytopenia  - Mgmt per IM, monitor for clinical deterioriation    pAfib with RVR  - mgmt per IM  - Pt not on full AC - anemia/thrombocytopenia         Seizure education.    No driving for now, no swimming alone, no climbing high areas, no operation of heavy machinery or working with high risk electricity equipment.  Continue to take AEDs as prescribed. If any major side effects from neurological medications go to the ED including mood changes and severe depression.  Patient and/or next of kin informed.  Follow up Neurology in 2-4 weeks at [phone].  Medication side effects discussed with the patient and/or caregiver.  Active Diagnoses:    Diagnosis Date Noted POA    Hypoglycemia, unspecified [E16.2] 08/15/2019 Unknown    Altered mental status [R41.82] 08/12/2019 Yes    Encephalomalacia on imaging study [G93.89] 08/06/2019 Yes    Pneumonia due to infectious organism [J18.9] 08/06/2019 Yes    ESRD (end stage renal disease) [N18.6] 05/11/2016 Yes      Problems Resolved During this Admission:    Diagnosis Date Noted Date Resolved POA    PRINCIPAL PROBLEM:  Encephalopathy, metabolic  [G92] 08/06/2019 08/09/2019 Yes    Disorientation [R41.0] 08/06/2019 08/09/2019 Yes    Hypoglycemia [E16.2] 08/06/2019 08/09/2019 Yes    Elevated lactic acid level [R79.89] 08/06/2019 08/09/2019 Yes    Altered mental status [R41.82] 08/06/2019 08/09/2019 Yes    Atrial fibrillation with RVR [I48.91] 07/18/2017 08/09/2019 Yes       VTE Risk Mitigation (From admission, onward)        Ordered     heparin (porcine) injection 5,000 Units  Every 8 hours      08/06/19 2001     Place ROSANNA hose  Until discontinued      08/06/19  2001     IP VTE HIGH RISK PATIENT  Once      08/06/19 2001          TODD Veliz  Neurology  [facility]    I, Dr. Ramses Florentino, have personally seen and examined the patient with my advanced provider and agree with above. I personally did a focused exam, and reviewed all necessary clinical information. I discussed my management plan with my NP and agree with above.

## 2019-08-19 NOTE — PROGRESS NOTES
No acute overnight issues  Feels better  Nursing notes however, still confused      AA  NAd  RRR, no rub  CTA B UL; rales BLL, chronic and stable  abd s/nt  + LE edema    View Graph      Most Recent     Temp (°F) 97.8-98.9 98.4-98.5  98.4 (36.9)  1952   Pulse    107  1952   Resp 14-25  20  20  1952   //97  132/57 -158/59   158/59   1952   MAP (mmHg) 83-96         SpO2 (%) 55 -100 95-97  97  1952   Weight (kg) 53.5         Intake/Output    Report   View Table   Intake Total  480mL  (480 mL / 53.5 kg = 9 mL/kg)  700 - /7793809/18  200  120  480  +320  +480  320  (6)  480  (9)  P.O.  IV Piggyback  Ne  Report   Scheduled     Medication Dose/Rate, Route, Frequency Last Action   aspirin EC tablet 81 mg 81 mg, Oral, Daily Given:  0940   diltiaZEM tablet 30 mg 30 mg, Oral, Q8H Given:  1500   epoetin alfonzo-epbx injection 5,500 Units 100 Units/kg, IV, Every Mon, Wed, Fri Ordered   heparin (porcine) injection 5,000 Units 5,000 Units, SubQ, Q8H Given:  1624   lacosamide (VIMPAT) 50 mg in sodium chloride 0.9% 100 mL IVPB 50 mg, IV, Q12H New Ba/18 1258   levETIRAcetam (KEPPRA) 750 mg in dextrose 5 % 100 mL IVPB 0 mg, IV, Daily Stopped:  1205   piperacillin-tazobactam 4.5 g in dextrose 5 % 100 mL IVPB (ready to mix system) 4.5 g, IV, Q12H New Ba/18 0941   potassium chloride SA CR tablet 40 mEq 40 mEq, Oral, Once Ordered   sodium thiosulfate 12.5 gram/50 mL (250 mg/mL) injection 12.5 g 12.5 g, IV, Every Mon, Wed, Fri New Ba/16 1151   valproate (DEPACON) 250 mg in dextrose 5 % 50 mL IVPB 250 mg, IV, Q24H New Ba/18 1408   valproate (DEPACON) 500 mg in dextrose 5 % 50 mL IVPB 500 mg, IV, Q24H New Ba/18 0041      Continuous     Medication Dose/Rate, Route, Frequency Last Action   dextrose 10 % infusion 50 mL/hr, IV, Continuous New Ba/18 2392      PRN     Medication  Dose/Rate, Route, Frequency Last Action   0.9%  NaCl infusion No Dose/Rate, IV, PRN Ordered   dextrose 50% injection 12.5 g 12.5 g, IV, PRN Given: 08/17 1624   dicyclomine capsule 10 mg 10 mg, Oral, QID PRN Ordered   glucagon (human recombinant) injection 1 mg 1 mg, IM, PRN Ordered   insulin aspart U-100 pen 0-5 Units 0-5 Units, SubQ, Q6H PRN Ordered   levETIRAcetam (KEPPRA) 500 mg in dextrose 5 % 100 mL IVPB 500 mg, IV, Daily PRN Ordered   metoprolol injection 5 mg 5 mg, IV, Q6H PRN Given: 08/12 1658   ondansetron injection 4 mg 4 mg, IV, Q4H PRN Given: 08/17        Selected Labs    Report   (Up to last 2 results from the past 72 hours)   Switch View    08/16 0822 08/16 1355 08/17 0609   Sodium 137     --     138       Potassium 3.6     --     3.2Low        Chloride 95     --     92Low        CO2 19Low      --     28       BUN, Bld 11     --     8       Creatinine 5.8High      --     4.0High        Glucose --     48Low Panic      57Low        Magnesium --     --     1.8       WBC 4.03     --     --       Hemoglobin 8.8Low      --     --       Hematocrit 26.6Low      --     --       Platelets 123Low      --     --       Phosphorus --     --     --        08/16 0746     Sodium --         Potassium --         Chloride --         CO2 --         BUN, Bld --         Creatinine --         Glucose --         Magnesium --         WBC --         Hemoglobin --         Hematocrit --         Platelets --         Phosphorus 2.7                       A/P:     1 ESRD (HD-MWF via R UE AV Fistula)-   Stable on MWF schedule for now     2. HYPOKALEMIA  Recurrent issue for her  Would give ONLY IV rider today and hold PO; she tends towards hyper K+very easily...in addition, values are from yesterday...     3. HTN-  Acceptable control    -continue current anti-HTN drug regimen (GIVE BP MEDS POST HD on HD Days) +/- HD-associated UF     4. ANEMIA- clinically stable at present;  DONNY with HD TIW     5. SECONDARY HYPERPARATHYROIDISM  (sHPT)-  With calciphylaxis  - clinically stable on current therapy (Na thiosulfate 12.5 gm x last 30-60 min of HD); no active issues    -PO4 controlled off binders for now  F/u iCa     6. ALTERED MENTAL STATUS-   Confusion seems better as I am familiar with her  Neuro assisting

## 2019-08-19 NOTE — PROGRESS NOTES
[facility]  Cardiology  Progress Note    Patient Name: Griselda Neely  MRN: [id number]  Admission Date: 8/6/2019  Hospital Length of Stay: 13 days  Code Status: Full Code   Attending Physician: Amos Snyder MD   Primary Care Physician: Kalie Neely MD  Expected Discharge Date:   Principal Problem:Encephalopathy, toxic    Subjective:       Interval History:     Patient tolerated HD today without problems. She has been eating more and walked yesterday. She seems to be back to her baseline neurologically.     ROS:  No significant headaches or sore throat or runny nose.   No recent changes in vision.   No recent changes in hearing.  No dysphagia or odynophagia.  Denies chest pain and shortness of breath at baseline.   Denies any cough or hemoptysis.   Denies any abdominal pain, nausea, vomiting, diarrhea or constipation.   Denies any dysuria or polyuria.   Denies any fevers or chills.   Denies any recent significant weight changes.   Denies bleeding diathesis       Cardiology:  Atrial fibrillation, Controlled rates.    Objective:     Vital Signs (Most Recent):  Temp: 97 °F (36.1 °C) (08/19/19 1300)  Pulse: 96 (08/19/19 1300)  Resp: 20 (08/19/19 1300)  BP: (!) 144/82 (08/19/19 1300)  SpO2: 100 % (08/19/19 0822) Vital Signs (24h Range):  Temp:  [96.7 °F (35.9 °C)-98.4 °F (36.9 °C)] 97 °F (36.1 °C)  Pulse:  [] 96  Resp:  [18-20] 20  SpO2:  [97 %-100 %] 100 %  BP: ()/() 144/82     Weight: 58.9 kg (129 lb 13.6 oz)  Body mass index is 21.61 kg/m².    SpO2: 100 %  O2 Device (Oxygen Therapy): room air      Intake/Output Summary (Last 24 hours) at 8/19/2019 1629  Last data filed at 8/19/2019 1300  Gross per 24 hour   Intake 500 ml   Output 3500 ml   Net -3000 ml       Lines/Drains/Airways     Central Venous Catheter Line                 Percutaneous Central Line Insertion/Assessment - triple lumen  08/12/19 1921 6 days          Drain                 Hemodialysis AV Fistula 08/08/19 0217  Right upper arm 11 days          Peripheral Intravenous Line                 Midline Catheter Insertion/Assessment  - Single Lumen 08/12/19 1700 Left median cubital vein (antecubital fossa) 6 days                Scheduled Meds:   aspirin  81 mg Oral Daily    diltiaZEM  60 mg Oral Q8H    epoetin alfonzo-ebpx (RETACRIT) injection  100 Units/kg Intravenous Every Mon, Wed, Fri    heparin (porcine)  5,000 Units Subcutaneous Q8H    lacosamide (VIMPAT) IVPB  50 mg Intravenous Q12H    levetiracetam IVPB  750 mg Intravenous Daily    piperacillin-tazobactam (ZOSYN) IVPB  4.5 g Intravenous Q12H    potassium chloride  40 mEq Oral Once    sodium thiosulfate  12.5 g Intravenous Every Mon, Wed, Fri    valproate sodium (DEPACON) IVPB  250 mg Intravenous Q24H    valproate sodium (DEPACON) IVPB  500 mg Intravenous Q24H     Continuous Infusions:    PRN Meds:.sodium chloride 0.9%, albumin human 5%, dextrose 50%, dicyclomine, glucagon (human recombinant), insulin aspart U-100, levetiracetam IVPB, metoprolol, ondansetron, piperacillin-tazobactam (ZOSYN) IVPB, sodium chloride 0.9%     Physical Exam     HEENT: Normocephalic, atraumatic, PERRL, Conjunctiva pink, no scleral icterus.   CVS: S1S2+, Irregular, + SM, rubs or gallops, JVP: Normal.  LUNGS: Clear.   ABDOMEN: Soft, NT, BS+  EXTREMITIES: No cyanosis, clubbing or edema. Left upper arm fistula + thrill + bruit. Left thigh calciphylaxis ulcer with dressing CDI.   NEURO: Alert and oriented.         Significant Labs:   Blood Culture:   No results for input(s): LABBLOO in the last 48 hours., BMP:   Recent Labs   Lab 08/19/19  0415   GLU 78   *   K 3.3*   CL 91*   CO2 22*   BUN 16   CREATININE 5.7*   CALCIUM 7.4*   MG 1.7   , CMP   Recent Labs   Lab 08/19/19  0415   *   K 3.3*   CL 91*   CO2 22*   GLU 78   BUN 16   CREATININE 5.7*   CALCIUM 7.4*   PROT 5.8*   ALBUMIN 2.4*   BILITOT 0.8   ALKPHOS 50*   AST 14   ALT 7*   ANIONGAP 19*   ESTGFRAFRICA 7.9*   EGFRNONAA 6.9*    , CBC   Recent Labs   Lab 08/19/19  0415   WBC 4.51   HGB 8.1*   HCT 23.6*   *   , INR   No results for input(s): INR, PROTIME in the last 48 hours., Lipid Panel   No results for input(s): CHOL, HDL, LDLCALC, TRIG, CHOLHDL in the last 48 hours.,   Pathology Results  (Last 10 years)    None          Significant Imaging:  I have reviewed the imaging all significant imaging for the last 24 hours.      Assessment and Plan:     IMPRESSION:  1. Altered mental status. Secondary to seizure activity vs hypoglyemia. Head CT negative. EEG consistent with encephalopathy, negative for seizure activity. Waxing and waning symptoms. Etiology? Neurology on board. EEG from yesterday without seizure activity, improving. Mental status improving.   2. Congestive heart failure. Acute on chronic. LVEF ~40-45% on last echo 7/2019. Euvolemic on exam.   3. Hypoglycemia. Etiology likely poor oral intake. Endocrine work up in progress. D 20 currently off.  Glucose levels more stable. C peptide level normal. Insulin level 2.1 (low).   4. Calciphlyaxis. Left leg ulcer. Undergoing wound care treatments.   5. Atrial fibrillation. Valvular. Persistent. Patient does not wish to be on any oral anticoagulation or to be cardioverted. Currently rate controlled.   6. Hypertension. Better controlled at present. Elevates with agitation. Periods of hypotension. Seems better.   7. s/p MVR via right thoracotomy approach on 3/18/19 with Dr. Lora. Functioning appropriately per last echo.   8. Mildly elevated troponins in the presence of ESRD. Chronic. Not consistent with ACS.   9. CAD. Nonobstructive on [facility] done on 3/13/2019.   10. End-stage renal disease on hemodialysis MWF. Anuric. Followed by Dr. Ingram.   11. Hypocalcemia. Nephrology on board and managing.   12. Hypokalemia.  Ongoing. Replacement ordered.   13. Hyperlipidemia  14. Chronic tobacco abuse.  15. COPD on home O2 at 2L  16. Reported history of acute pancreatitis. Amylase, Lipase WNL.    17. Anemia. Acute on chronic.     PLAN:    Patient stable for discharge from our standpoint.   Follow neurology recommendations for seizure medications.  Plans to follow up with neurology in 2 weeks with repeat seizure drug levels.   Continue Cardizem 60 mg PO TID.   Follow up with us in the clinic in 1-2 weeks.     Ramila Bloom NP  Cardiology  [facility]    I have personally seen and examined the patient. I reviewed the notes, assessments, and/or procedures performed by Ms Ramila Bloom, I concur with her documentation of Griselda Neely.

## 2019-08-19 NOTE — DISCHARGE SUMMARY
"Critical access hospital Medicine  Discharge Summary      Patient Name: Griselda Neely  MRN: [id number]  Admission Date: 8/6/2019  Hospital Length of Stay: 13 days  Discharge Date and Time:  08/19/2019 4:28 PM  Attending Physician: Amos Snyder MD   Discharging Provider: Amos Snyder MD  Primary Care Provider: Kalie Neely MD      HPI:   Griselda Neely is a 73 y.o. female with a history as below who presented to the ED from her cardiologist office via  for evaluation of altered mental status changes.  Sister at bedside reports patient has been in [facility] s/p discharge from hospital ~ 3 weeks ago, diagnosis unclear as family states "I know she was started on anti-seizure medications, Keppra and vimpat. Sister at bedside further states patient stopped eating and drinking on Friday.  Further reports patient with ESRD, had dialysis on Wednesday and Friday but was taken off early. Also reports went to HD on Monday.  Per chart review, patient hospitalized In July 2019 and developed acute encephalopathy during that admission, neurology was consulted with extensive work-up and non-acute findings. On admit, labs and imaging reviewed.  Neurology consulted with recs for MRI/MRA and EEG.  Admitted for acute encephalitis ?metabolic +/- uremia +/- infectious process  +/- hypoglycemia +/- medications.  Nephrology consulted for inpatient HD.         I have been asked to see the pt regarding hypoglycemia which has developed since admit. Pt has required D20 infusion to keep glucoses in nml range. Pt is minimally responsive to me - she moans when asked questions, but is not able to answer any questions.           Hospital Course:   The patient is being admitted for acute metabolic encephalopathy, like clearly multifactorial.  She also had a pneumonia that was treated broad-spectrum antibiotics.  She finished a full course of antibiotics in the hospital, she will finish cefdinir PO tomorrow.  She " has been evaluated by multidisciplinary team, nephrology, for hemodialysis, cardiology, Pulmonary, Infectious Disease, Neurology.  She underwent an EEG, which was appreciated she might have seizures.  As per Neurology she has been started on Vimpat and Depakote, she will continue at home, Vimpat Depakote, and Keppra.  She will need to recheck in skip Depakote levels, as per Neurology.  The patient will be discharged home with home health, physical therapy.  Day of discharge patient is awake alert oriented x3.    Physical examination  General, no acute distress  Pulmonary clear to auscultation bilaterally  Cardiac, irregular in any notable murmurs  Abdomen, soft nontender nondistended  Extremities no edema, no calf tenderness    Consults:   Consults (From admission, onward)        Status Ordering Provider     Inpatient consult to Anesthesiology  Once     Provider:  (Not yet assigned)    Acknowledged ERENDIRA CALVO     Inpatient consult to Cardiology  Once     Provider:  Katharina Powell MD    Completed PORSHA BOSCH     Inpatient consult to Endocrinology  Once     Provider:  Suly Roca    Acknowledged VIRAL STEEL     Inpatient consult to Hospitalist  Once     Provider:  (Not yet assigned)    Acknowledged PORSHA BOSCH     Inpatient consult to Nephrology  Once     Provider:  Joseph Ingram MD    Completed IDA CUENCA     Inpatient consult to Nephrology  Once     Provider:  Joseph Ingram MD    Acknowledged ERENDIRA CALVO     Inpatient consult to Neurology  Once     Provider:  Jef Florentino MD    Acknowledged PORSHA BOSCH     Inpatient consult to Neurology  Once     Provider:  Jef Florentino MD    Acknowledged ERENDIRA CALVO     Inpatient consult to Registered Dietitian/Nutritionist  Once     Provider:  (Not yet assigned)    PORSHA Ann     Inpatient consult to   Once     Provider:  (Not yet assigned)    PORSHA Ann      Inpatient consult to Social Work/Case Management  Once     Provider:  (Not yet assigned)    Completed ERENDIRA CALVO     Inpatient consult to Telemedicine-Stroke  Once     Provider:  Jef Florentino MD    Acknowledged PORSHA BOSCH     Pharmacy to dose Vancomycin consult  Once     Provider:  (Not yet assigned)    Acknowledged ERENDIRA CALVO          No new Assessment & Plan notes have been filed under this hospital service since the last note was generated.  Service: Hospital Medicine    Final Active Diagnoses:    Diagnosis Date Noted POA    ESRD (end stage renal disease) [N18.6] 05/11/2016 Yes    Encephalomalacia on imaging study [G93.89] 08/06/2019 Yes    Pneumonia due to infectious organism [J18.9] 08/06/2019 Yes    Hypoglycemia, unspecified [E16.2] 08/15/2019 Unknown    Altered mental status [R41.82] 08/12/2019 Yes      Problems Resolved During this Admission:    Diagnosis Date Noted Date Resolved POA    PRINCIPAL PROBLEM:  Encephalopathy, metabolic  [G92] 08/06/2019 08/09/2019 Yes    Disorientation [R41.0] 08/06/2019 08/09/2019 Yes    Hypoglycemia [E16.2] 08/06/2019 08/09/2019 Yes    Elevated lactic acid level [R79.89] 08/06/2019 08/09/2019 Yes    Altered mental status [R41.82] 08/06/2019 08/09/2019 Yes    Atrial fibrillation with RVR [I48.91] 07/18/2017 08/09/2019 Yes       Discharged Condition: fair    Disposition: Home or Self Care    Follow Up:  Follow-up Information     Joseph Ingram MD In 1 week.    Specialty:  Nephrology  Contact information:  [address] [facility] [address]  [phone]             Chente Ledezma MD In 1 week.    Specialty:  Neurology  Contact information:  [address] [facility] [address]  [phone]             Katharina Powell MD In 1 week.    Specialties:  Cardiovascular Disease, Cardiology  Contact information:  [address] [facility]  [address]  [place] [address] [phone]                 Patient Instructions:      Diet renal      Notify your health care provider if you experience any of the following:  persistent nausea and vomiting or diarrhea     Notify your health care provider if you experience any of the following:  severe uncontrolled pain     Notify your health care provider if you experience any of the following:  severe persistent headache     Notify your health care provider if you experience any of the following:  persistent dizziness, light-headedness, or visual disturbances     Notify your health care provider if you experience any of the following:  increased confusion or weakness     Activity as tolerated     Activity as tolerated       Significant Diagnostic Studies: Labs:   CMP   Recent Labs   Lab 08/19/19  0415   *   K 3.3*   CL 91*   CO2 22*   GLU 78   BUN 16   CREATININE 5.7*   CALCIUM 7.4*   PROT 5.8*   ALBUMIN 2.4*   BILITOT 0.8   ALKPHOS 50*   AST 14   ALT 7*   ANIONGAP 19*   ESTGFRAFRICA 7.9*   EGFRNONAA 6.9*    and CBC   Recent Labs   Lab 08/19/19  0415   WBC 4.51   HGB 8.1*   HCT 23.6*   *       Pending Diagnostic Studies:     None         Medications:  Reconciled Home Medications:      Medication List      START taking these medications    cefdinir 300 MG capsule  Commonly known as:  OMNICEF  Take 1 capsule (300 mg total) by mouth once daily. for 10 days     diltiaZEM 180 MG 24 hr capsule  Commonly known as:  CARDIZEM CD  Take 1 capsule (180 mg total) by mouth once daily.  Replaces:  diltiaZEM 120 MG tablet        CHANGE how you take these medications    lacosamide 50 mg Tab  Commonly known as:  VIMPAT  Take 1 tablet (50 mg total) by mouth once daily.  What changed:    · how much to take  · when to take this     levETIRAcetam 500 MG Tab  Commonly known as:  KEPPRA  Take 500 mg by mouth once daily.  What changed:  Another medication with the same name was removed. Continue taking this medication, and follow the directions you see here.     metoprolol succinate 50 MG 24 hr tablet  Commonly known as:   TOPROL-XL  Take 1 tablet (50 mg total) by mouth once daily.  What changed:    · medication strength  · how much to take        CONTINUE taking these medications    aspirin 81 MG EC tablet  Commonly known as:  ECOTRIN  Take 81 mg by mouth once daily.     fluticasone furoate-vilanterol 100-25 mcg/dose diskus inhaler  Commonly known as:  BREO  Inhale 1 puff into the lungs once daily. Controller     isosorbide mononitrate 30 MG 24 hr tablet  Commonly known as:  IMDUR  Take 30 mg by mouth once daily.     losartan 25 MG tablet  Commonly known as:  COZAAR  Take 25 mg by mouth once daily.     ondansetron 4 MG tablet  Commonly known as:  ZOFRAN  Take 4 mg by mouth every 6 (six) hours as needed for Nausea.     sevelamer carbonate 800 mg Tab  Commonly known as:  RENVELA  Take 800 mg by mouth 3 (three) times daily with meals.     traMADol 50 mg tablet  Commonly known as:  ULTRAM  Take 50 mg by mouth every 6 (six) hours as needed for Pain.        STOP taking these medications    acetaminophen 325 MG tablet  Commonly known as:  TYLENOL     albuterol-ipratropium 2.5 mg-0.5 mg/3 mL nebulizer solution  Commonly known as:  DUO-NEB     budesonide-formoterol 160-4.5 mcg 160-4.5 mcg/actuation Hfaa  Commonly known as:  SYMBICORT     clindamycin 300 MG capsule  Commonly known as:  CLEOCIN     diltiaZEM 120 MG tablet  Commonly known as:  CARDIZEM  Replaced by:  diltiaZEM 180 MG 24 hr capsule     HYDROcodone-acetaminophen 5-325 mg per tablet  Commonly known as:  NORCO     nitroGLYCERIN 0.3 MG SL tablet  Commonly known as:  NITROSTAT            Indwelling Lines/Drains at time of discharge:   Lines/Drains/Airways     Central Venous Catheter Line                 Percutaneous Central Line Insertion/Assessment - triple lumen  08/12/19 1921 6 days          Drain                 Hemodialysis AV Fistula 08/08/19 0214 Right upper arm 11 days                Time spent on the discharge of patient: 31 minutes  Patient was seen and examined on the date  of discharge and determined to be suitable for discharge.         Amos Snyder MD  Department of Hospital Medicine  [facility]

## 2019-08-19 NOTE — PT/OT/SLP PROGRESS
Physical Therapy      Patient Name:  Griselda Neely   MRN:  [id number]    Patient not seen today secondary to Pt out of room for dialysis. Will follow-up tomorrow.    Hillary Hammant, PTA

## 2019-08-19 NOTE — PLAN OF CARE
08/19/19 1706   Discharge Reassessment   Assessment Type Discharge Planning Reassessment   Anticipated Discharge Disposition Home-Health   Patient being DC'd to home and had a consult for HH last week. No HH dc order seen. I spoke with Dr Snyder to inform him that I did not see HH orders on the patient. He stated that he entered them 2 times. I informed him that I do not see them. He stated that he does not know what else to do. Unable to send referral for HH; no orders rec'd. DC med rec also not completed.

## 2019-08-20 NOTE — PLAN OF CARE
08/20/19 1442   Discharge Reassessment   Anticipated Discharge Disposition Home-Health   Hand written script for HH rec'd from  Dr Victoria ( he attempted to enter orders in Epic but rec'd a message rthat the orders could not be entered because the paient was dc'd over 2 hours ago). I faxed the orders and dc summary to [facility]  to [phone]. I called the A [phone] spoke with Mary informed her that the dc orders have been sent. I left  My contact info with her.

## 2019-08-24 ENCOUNTER — HOSPITAL ENCOUNTER (EMERGENCY)
Facility: HOSPITAL | Age: 74
Discharge: HOME | End: 2019-08-24
Attending: EMERGENCY MEDICINE
Payer: MEDICARE

## 2019-08-24 VITALS
DIASTOLIC BLOOD PRESSURE: 64 MMHG | HEART RATE: 60 BPM | OXYGEN SATURATION: 99 % | HEIGHT: 65 IN | TEMPERATURE: 98 F | WEIGHT: 118 LBS | RESPIRATION RATE: 18 BRPM | SYSTOLIC BLOOD PRESSURE: 155 MMHG | BODY MASS INDEX: 19.66 KG/M2

## 2019-08-24 DIAGNOSIS — E83.59 CALCIPHYLAXIS OF LEFT LOWER EXTREMITY WITH NONHEALING ULCER WITH FAT LAYER EXPOSED: Primary | ICD-10-CM

## 2019-08-24 DIAGNOSIS — L97.922 CALCIPHYLAXIS OF LEFT LOWER EXTREMITY WITH NONHEALING ULCER WITH FAT LAYER EXPOSED: Primary | ICD-10-CM

## 2019-08-24 DIAGNOSIS — E83.59 CALCIPHYLAXIS: ICD-10-CM

## 2019-08-24 PROCEDURE — 25000003 PHARM REV CODE 250: Performed by: EMERGENCY MEDICINE

## 2019-08-24 PROCEDURE — 99283 EMERGENCY DEPT VISIT LOW MDM: CPT

## 2019-08-24 RX ORDER — MUPIROCIN 20 MG/G
1 OINTMENT TOPICAL
Status: COMPLETED | OUTPATIENT
Start: 2019-08-24 | End: 2019-08-24

## 2019-08-24 RX ORDER — ACETAMINOPHEN 500 MG
1000 TABLET ORAL
Status: COMPLETED | OUTPATIENT
Start: 2019-08-24 | End: 2019-08-24

## 2019-08-24 RX ADMIN — ACETAMINOPHEN 1000 MG: 500 TABLET, FILM COATED ORAL at 01:08

## 2019-08-24 RX ADMIN — MUPIROCIN 22 G: 20 OINTMENT TOPICAL at 01:08

## 2019-08-24 NOTE — ED PROVIDER NOTES
Encounter Date: 8/24/2019       History     Chief Complaint   Patient presents with    Leg Pain     pain at wound site to upper left leg      73-year-old female with multiple medical problems including ESRD (last HD today), calciphylaxis with chronic ulcers of extremities presents to emergency department with left upper leg wound.  The patient was recently discharged home from a local nursing home.  Home health and wound care scheduled to come tomorrow but the patient and her son have been changing the dressings on their own.  The patient reports moderate to severe pain in her left upper leg ulcer that are keeping her from being able to sleep.  No drainage or fevers, chills.  No surrounding skin changes.         Review of patient's allergies indicates:  No Known Allergies  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    both legs    CHF (congestive heart failure)     Encounter for blood transfusion 03/2016    Gout     Hypertension     Mitral valve regurgitation     Osteoarthritis     Pancreatitis     Peripheral vascular disease     Peritoneal dialysis catheter in place     Pneumonia 09/09/2017    Renal failure      Past Surgical History:   Procedure Laterality Date    ACHILLES TENDON SURGERY Right     APPENDECTOMY      CARDIAC CATHETERIZATION  07/03/2017    CHOLECYSTECTOMY      heal surgery Right     HYSTERECTOMY      INSERTION-PERITONEAL DIALYSIS CATHETER-LAPAROSCOPIC N/A 4/26/2016    Performed by [facility]es [facility] at [facility] OR    PARATHYROIDECTOMY      PARATHYROIDECTOMY  07/13/2017    PARATHYROIDECTOMY N/A 7/13/2017    Performed by Jhoan Willoughby MD at [facility] OR    PERITONEAL CATHETER INSERTION      REIMPLANTATION-PARATHYROID TISSUE N/A 7/13/2017    Performed by Jhoan Willoughby MD at [facility] OR    REMOVAL-CATHETER-DIALYSIS-PERITONEAL N/A 9/19/2017    Performed by [facility]an Willoughby MD at [facility] OR    RENAL BIOPSY      vocal cord nodule        Family History   Problem Relation Age of Onset    Heart disease Sister     Early death Sister         heart as baby    Heart disease Maternal Grandfather     Diabetes Mother     Hypertension Mother     Heart failure Mother     Heart disease Mother     Arthritis Mother     Diabetes Father     Early death Sister         infant     Social History     Tobacco Use    Smoking status: Current Some Day Smoker     Packs/day: 0.50     Years: 45.00     Pack years: 22.50    Smokeless tobacco: Never Used   Substance Use Topics    Alcohol use: No    Drug use: No     Review of Systems   Constitutional: Negative for chills, diaphoresis, fatigue and fever.   HENT: Negative for congestion.    Eyes: Negative for visual disturbance.   Respiratory: Negative for cough, shortness of breath, wheezing and stridor.    Cardiovascular: Negative for chest pain and palpitations.   Gastrointestinal: Negative for abdominal distention, diarrhea, nausea and vomiting.   Genitourinary: Negative for dysuria, frequency, hematuria and urgency.   Musculoskeletal: Negative for back pain.   Skin: Positive for wound. Negative for pallor.   Neurological: Negative for weakness, light-headedness, numbness and headaches.   Psychiatric/Behavioral: Negative for confusion.   All other systems reviewed and are negative.      Physical Exam     Initial Vitals [08/24/19 0030]   BP Pulse Resp Temp SpO2   137/67 (!) 50 18 97.9 °F (36.6 °C) 98 %      MAP       --         Physical Exam    Nursing note and vitals reviewed.  Constitutional: No distress.   Thin, chronically ill-appearing   HENT:   Head: Normocephalic and atraumatic.   Eyes: EOM are normal.   Neck: Normal range of motion. Neck supple. No JVD present.   Cardiovascular: Normal rate, regular rhythm, normal heart sounds and intact distal pulses.   Pulmonary/Chest: Breath sounds normal. She has no wheezes. She has no rhonchi. She has no rales.   Abdominal: Soft. There is no tenderness.    Musculoskeletal: Normal range of motion. She exhibits no edema or tenderness.   Neurological: She has normal strength. GCS score is 15. GCS eye subscore is 4. GCS verbal subscore is 5. GCS motor subscore is 6.   Skin:   Chronic appearing 2x3 cm ulceration to left proximal medial thigh with fibrinous exudate and BV red granulation tissue, no discharge, no surrounding erythema or cellulitis   Psychiatric: She has a normal mood and affect.         ED Course   Procedures  Labs Reviewed - No data to display       Imaging Results    None          Medical Decision Making:   ED Management:  73-year-old female presents with chronic ulcer to left thigh with worsening pain.  There is no signs of infection.  The patient was given Tylenol and the wound was dressed with Bactroban and a nonstick dressing.  After dressing change the patient states that her pain is resolved.  She feels much better would like to be discharged.  She does have home health and wound care coming to her house today.  Follow up with primary care in wound care.  Detailed return precautions were discussed.                      Clinical Impression:       ICD-10-CM ICD-9-CM   1. 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 E83.59 707.10    L97.922    2. Calciphylaxis E83.59 275.49                                Rowena Gauthier MD  08/29/19 040

## 2019-08-24 NOTE — DISCHARGE INSTRUCTIONS
Follow wound care instructions.  Return for worsening symptoms.  Future Appointments   Date Time Provider Department Center   12/4/2019 11:00 AM MD JUANITO Neal

## 2019-09-07 ENCOUNTER — HOSPITAL ENCOUNTER (INPATIENT)
Facility: HOSPITAL | Age: 74
LOS: 5 days | Discharge: HOME HEALTH | DRG: 871 | End: 2019-09-12
Attending: EMERGENCY MEDICINE | Admitting: INTERNAL MEDICINE
Payer: MEDICARE

## 2019-09-07 DIAGNOSIS — R53.1 WEAKNESS: ICD-10-CM

## 2019-09-07 DIAGNOSIS — N18.6 ESRD (END STAGE RENAL DISEASE): ICD-10-CM

## 2019-09-07 DIAGNOSIS — E16.2 HYPOGLYCEMIA: ICD-10-CM

## 2019-09-07 DIAGNOSIS — R65.20 SEVERE SEPSIS: ICD-10-CM

## 2019-09-07 DIAGNOSIS — A41.9 SEPSIS, DUE TO UNSPECIFIED ORGANISM: Primary | ICD-10-CM

## 2019-09-07 DIAGNOSIS — G93.41 METABOLIC ENCEPHALOPATHY: ICD-10-CM

## 2019-09-07 DIAGNOSIS — I48.91 ATRIAL FIBRILLATION WITH RAPID VENTRICULAR RESPONSE: ICD-10-CM

## 2019-09-07 DIAGNOSIS — G93.41 ACUTE METABOLIC ENCEPHALOPATHY: ICD-10-CM

## 2019-09-07 DIAGNOSIS — A41.9 SEVERE SEPSIS: ICD-10-CM

## 2019-09-07 LAB
ALBUMIN SERPL BCP-MCNC: 3 G/DL (ref 3.5–5.2)
ALLENS TEST: ABNORMAL
ALP SERPL-CCNC: 101 U/L (ref 55–135)
ALT SERPL W/O P-5'-P-CCNC: 18 U/L (ref 10–44)
ANION GAP SERPL CALC-SCNC: 18 MMOL/L (ref 8–16)
APTT PPP: 34.8 SEC (ref 26.2–34.7)
AST SERPL-CCNC: 55 U/L (ref 10–40)
BASOPHILS # BLD AUTO: 0.04 K/UL (ref 0–0.2)
BASOPHILS NFR BLD: 0.4 % (ref 0–1.9)
BILIRUB DIRECT SERPL-MCNC: 0.4 MG/DL (ref 0.1–0.3)
BILIRUB SERPL-MCNC: 1.3 MG/DL (ref 0.1–1)
BUN SERPL-MCNC: 42 MG/DL (ref 6–30)
CHLORIDE SERPL-SCNC: 100 MMOL/L (ref 95–110)
CK MB SERPL-MCNC: 1.1 NG/ML (ref 0.1–6.5)
CK SERPL-CCNC: 28 U/L (ref 20–180)
CREAT SERPL-MCNC: 8.1 MG/DL (ref 0.5–1.4)
DIFFERENTIAL METHOD: ABNORMAL
EOSINOPHIL # BLD AUTO: 0 K/UL (ref 0–0.5)
EOSINOPHIL NFR BLD: 0 % (ref 0–8)
ERYTHROCYTE [DISTWIDTH] IN BLOOD BY AUTOMATED COUNT: 24.6 % (ref 11.5–14.5)
GLUCOSE SERPL-MCNC: 152 MG/DL (ref 70–110)
GLUCOSE SERPL-MCNC: 40 MG/DL (ref 70–110)
HCO3 UR-SCNC: 29 MMOL/L (ref 24–28)
HCT VFR BLD AUTO: 29.4 % (ref 37–48.5)
HCT VFR BLD CALC: 31 %PCV (ref 36–54)
HGB BLD-MCNC: 9.2 G/DL (ref 12–16)
IMM GRANULOCYTES # BLD AUTO: 0.05 K/UL (ref 0–0.04)
IMM GRANULOCYTES NFR BLD AUTO: 0.5 % (ref 0–0.5)
INR PPP: 1.4
LACTATE SERPL-SCNC: 2.2 MMOL/L (ref 0.5–1.9)
LACTATE SERPL-SCNC: 6.4 MMOL/L (ref 0.5–1.9)
LIPASE SERPL-CCNC: 39 U/L (ref 4–60)
LYMPHOCYTES # BLD AUTO: 1.2 K/UL (ref 1–4.8)
LYMPHOCYTES NFR BLD: 12.5 % (ref 18–48)
MAGNESIUM SERPL-MCNC: 2.2 MG/DL (ref 1.6–2.6)
MCH RBC QN AUTO: 29.2 PG (ref 27–31)
MCHC RBC AUTO-ENTMCNC: 31.3 G/DL (ref 32–36)
MCV RBC AUTO: 93 FL (ref 82–98)
MONOCYTES # BLD AUTO: 1.5 K/UL (ref 0.3–1)
MONOCYTES NFR BLD: 16 % (ref 4–15)
NEUTROPHILS # BLD AUTO: 6.5 K/UL (ref 1.8–7.7)
NEUTROPHILS NFR BLD: 70.6 % (ref 38–73)
NRBC BLD-RTO: 0 /100 WBC
PCO2 BLDA: 42.7 MMHG (ref 35–45)
PH SMN: 7.44 [PH] (ref 7.35–7.45)
PLATELET # BLD AUTO: 307 K/UL (ref 150–350)
PMV BLD AUTO: 10.5 FL (ref 9.2–12.9)
PO2 BLDA: 24 MMHG (ref 40–60)
POC BE: 5 MMOL/L
POC IONIZED CALCIUM: 1.01 MMOL/L (ref 1.06–1.42)
POC SATURATED O2: 44 % (ref 95–100)
POC TCO2 (MEASURED): 25 MMOL/L (ref 23–29)
POC TCO2: 30 MMOL/L (ref 24–29)
POTASSIUM BLD-SCNC: 4.8 MMOL/L (ref 3.5–5.1)
PROT SERPL-MCNC: 7.5 G/DL (ref 6–8.4)
PROTHROMBIN TIME: 17 SEC (ref 11.7–14)
RBC # BLD AUTO: 3.15 M/UL (ref 4–5.4)
SAMPLE: ABNORMAL
SAMPLE: ABNORMAL
SITE: ABNORMAL
SODIUM BLD-SCNC: 138 MMOL/L (ref 136–145)
TROPONIN I SERPL DL<=0.01 NG/ML-MCNC: 0.1 NG/ML (ref 0.02–0.04)
TSH SERPL DL<=0.005 MIU/L-ACNC: 2.64 UIU/ML (ref 0.34–5.6)
WBC # BLD AUTO: 9.23 K/UL (ref 3.9–12.7)

## 2019-09-07 PROCEDURE — 83735 ASSAY OF MAGNESIUM: CPT

## 2019-09-07 PROCEDURE — 82550 ASSAY OF CK (CPK): CPT

## 2019-09-07 PROCEDURE — 87040 BLOOD CULTURE FOR BACTERIA: CPT

## 2019-09-07 PROCEDURE — 96374 THER/PROPH/DIAG INJ IV PUSH: CPT | Mod: 59

## 2019-09-07 PROCEDURE — 63600175 PHARM REV CODE 636 W HCPCS: Performed by: INTERNAL MEDICINE

## 2019-09-07 PROCEDURE — 93005 ELECTROCARDIOGRAM TRACING: CPT

## 2019-09-07 PROCEDURE — 96376 TX/PRO/DX INJ SAME DRUG ADON: CPT

## 2019-09-07 PROCEDURE — 96361 HYDRATE IV INFUSION ADD-ON: CPT

## 2019-09-07 PROCEDURE — 63600175 PHARM REV CODE 636 W HCPCS: Performed by: EMERGENCY MEDICINE

## 2019-09-07 PROCEDURE — 83690 ASSAY OF LIPASE: CPT

## 2019-09-07 PROCEDURE — 94761 N-INVAS EAR/PLS OXIMETRY MLT: CPT

## 2019-09-07 PROCEDURE — 27000221 HC OXYGEN, UP TO 24 HOURS

## 2019-09-07 PROCEDURE — 99285 EMERGENCY DEPT VISIT HI MDM: CPT | Mod: 25

## 2019-09-07 PROCEDURE — 85025 COMPLETE CBC W/AUTO DIFF WBC: CPT

## 2019-09-07 PROCEDURE — 82553 CREATINE MB FRACTION: CPT

## 2019-09-07 PROCEDURE — 96365 THER/PROPH/DIAG IV INF INIT: CPT

## 2019-09-07 PROCEDURE — 25000003 PHARM REV CODE 250: Performed by: EMERGENCY MEDICINE

## 2019-09-07 PROCEDURE — 96375 TX/PRO/DX INJ NEW DRUG ADDON: CPT

## 2019-09-07 PROCEDURE — 36415 COLL VENOUS BLD VENIPUNCTURE: CPT

## 2019-09-07 PROCEDURE — 85730 THROMBOPLASTIN TIME PARTIAL: CPT

## 2019-09-07 PROCEDURE — 21000000 HC CCU ICU ROOM CHARGE

## 2019-09-07 PROCEDURE — 85610 PROTHROMBIN TIME: CPT

## 2019-09-07 PROCEDURE — 82962 GLUCOSE BLOOD TEST: CPT

## 2019-09-07 PROCEDURE — 84484 ASSAY OF TROPONIN QUANT: CPT

## 2019-09-07 PROCEDURE — 80076 HEPATIC FUNCTION PANEL: CPT

## 2019-09-07 PROCEDURE — 83605 ASSAY OF LACTIC ACID: CPT

## 2019-09-07 PROCEDURE — 84443 ASSAY THYROID STIM HORMONE: CPT

## 2019-09-07 PROCEDURE — 83605 ASSAY OF LACTIC ACID: CPT | Mod: 91

## 2019-09-07 RX ORDER — BUDESONIDE 0.5 MG/2ML
0.5 INHALANT ORAL 2 TIMES DAILY
Status: DISCONTINUED | OUTPATIENT
Start: 2019-09-08 | End: 2019-09-12 | Stop reason: HOSPADM

## 2019-09-07 RX ORDER — GLUCAGON 1 MG
1 KIT INJECTION
Status: DISCONTINUED | OUTPATIENT
Start: 2019-09-07 | End: 2019-09-08

## 2019-09-07 RX ORDER — ONDANSETRON 2 MG/ML
4 INJECTION INTRAMUSCULAR; INTRAVENOUS EVERY 8 HOURS PRN
Status: DISCONTINUED | OUTPATIENT
Start: 2019-09-07 | End: 2019-09-12 | Stop reason: HOSPADM

## 2019-09-07 RX ORDER — LACOSAMIDE 50 MG/1
50 TABLET ORAL DAILY
Status: DISCONTINUED | OUTPATIENT
Start: 2019-09-08 | End: 2019-09-12 | Stop reason: HOSPADM

## 2019-09-07 RX ORDER — ACETAMINOPHEN 325 MG/1
650 TABLET ORAL EVERY 8 HOURS PRN
Status: DISCONTINUED | OUTPATIENT
Start: 2019-09-07 | End: 2019-09-12 | Stop reason: HOSPADM

## 2019-09-07 RX ORDER — HYDROCODONE BITARTRATE AND ACETAMINOPHEN 10; 325 MG/1; MG/1
1 TABLET ORAL EVERY 6 HOURS PRN
Status: DISCONTINUED | OUTPATIENT
Start: 2019-09-07 | End: 2019-09-11

## 2019-09-07 RX ORDER — DIVALPROEX SODIUM 250 MG/1
750 TABLET, FILM COATED, EXTENDED RELEASE ORAL DAILY
COMMUNITY
End: 2019-09-29

## 2019-09-07 RX ORDER — TRAMADOL HYDROCHLORIDE 50 MG/1
50 TABLET ORAL EVERY 6 HOURS PRN
Status: DISCONTINUED | OUTPATIENT
Start: 2019-09-07 | End: 2019-09-12 | Stop reason: HOSPADM

## 2019-09-07 RX ORDER — ALBUTEROL SULFATE 0.83 MG/ML
2.5 SOLUTION RESPIRATORY (INHALATION) EVERY 4 HOURS PRN
Status: DISCONTINUED | OUTPATIENT
Start: 2019-09-08 | End: 2019-09-12 | Stop reason: HOSPADM

## 2019-09-07 RX ORDER — IBUPROFEN 200 MG
16 TABLET ORAL
Status: DISCONTINUED | OUTPATIENT
Start: 2019-09-07 | End: 2019-09-12 | Stop reason: HOSPADM

## 2019-09-07 RX ORDER — DILTIAZEM HYDROCHLORIDE 60 MG/1
60 TABLET, FILM COATED ORAL EVERY 8 HOURS
Status: DISCONTINUED | OUTPATIENT
Start: 2019-09-07 | End: 2019-09-12 | Stop reason: HOSPADM

## 2019-09-07 RX ORDER — CALCITRIOL 0.25 UG/1
0.25 CAPSULE ORAL DAILY
COMMUNITY
End: 2019-09-29

## 2019-09-07 RX ORDER — LOSARTAN POTASSIUM 25 MG/1
25 TABLET ORAL DAILY
Status: DISCONTINUED | OUTPATIENT
Start: 2019-09-08 | End: 2019-09-12 | Stop reason: HOSPADM

## 2019-09-07 RX ORDER — LANOLIN ALCOHOL/MO/W.PET/CERES
400 CREAM (GRAM) TOPICAL DAILY
Status: DISCONTINUED | OUTPATIENT
Start: 2019-09-08 | End: 2019-09-12 | Stop reason: HOSPADM

## 2019-09-07 RX ORDER — IBUPROFEN 200 MG
24 TABLET ORAL
Status: DISCONTINUED | OUTPATIENT
Start: 2019-09-07 | End: 2019-09-12 | Stop reason: HOSPADM

## 2019-09-07 RX ORDER — ACETAMINOPHEN 650 MG/1
650 SUPPOSITORY RECTAL
Status: COMPLETED | OUTPATIENT
Start: 2019-09-07 | End: 2019-09-07

## 2019-09-07 RX ORDER — LANOLIN ALCOHOL/MO/W.PET/CERES
400 CREAM (GRAM) TOPICAL DAILY
COMMUNITY
End: 2020-07-23

## 2019-09-07 RX ORDER — AMOXICILLIN 250 MG
1 CAPSULE ORAL 2 TIMES DAILY
Status: DISCONTINUED | OUTPATIENT
Start: 2019-09-07 | End: 2019-09-12 | Stop reason: HOSPADM

## 2019-09-07 RX ORDER — DILTIAZEM HYDROCHLORIDE 180 MG/1
180 CAPSULE, COATED, EXTENDED RELEASE ORAL DAILY
Status: DISCONTINUED | OUTPATIENT
Start: 2019-09-08 | End: 2019-09-10

## 2019-09-07 RX ORDER — DIVALPROEX SODIUM 250 MG/1
750 TABLET, FILM COATED, EXTENDED RELEASE ORAL DAILY
Status: DISCONTINUED | OUTPATIENT
Start: 2019-09-08 | End: 2019-09-12 | Stop reason: HOSPADM

## 2019-09-07 RX ORDER — CALCITRIOL 0.25 UG/1
0.25 CAPSULE ORAL DAILY
Status: DISCONTINUED | OUTPATIENT
Start: 2019-09-08 | End: 2019-09-12 | Stop reason: HOSPADM

## 2019-09-07 RX ORDER — METOPROLOL SUCCINATE 50 MG/1
50 TABLET, EXTENDED RELEASE ORAL DAILY
Status: DISCONTINUED | OUTPATIENT
Start: 2019-09-08 | End: 2019-09-09

## 2019-09-07 RX ORDER — OXYCODONE AND ACETAMINOPHEN 5; 325 MG/1; MG/1
1 TABLET ORAL EVERY 4 HOURS PRN
Status: DISCONTINUED | OUTPATIENT
Start: 2019-09-07 | End: 2019-09-11

## 2019-09-07 RX ORDER — ENOXAPARIN SODIUM 100 MG/ML
40 INJECTION SUBCUTANEOUS EVERY 24 HOURS
Status: DISCONTINUED | OUTPATIENT
Start: 2019-09-07 | End: 2019-09-12

## 2019-09-07 RX ORDER — FLUTICASONE FUROATE AND VILANTEROL 100; 25 UG/1; UG/1
1 POWDER RESPIRATORY (INHALATION) DAILY
Status: DISCONTINUED | OUTPATIENT
Start: 2019-09-08 | End: 2019-09-07

## 2019-09-07 RX ORDER — DILTIAZEM HCL 1 MG/ML
5 INJECTION, SOLUTION INTRAVENOUS CONTINUOUS
Status: DISCONTINUED | OUTPATIENT
Start: 2019-09-07 | End: 2019-09-11

## 2019-09-07 RX ORDER — NAPROXEN SODIUM 220 MG/1
81 TABLET, FILM COATED ORAL DAILY
Status: DISCONTINUED | OUTPATIENT
Start: 2019-09-08 | End: 2019-09-12 | Stop reason: HOSPADM

## 2019-09-07 RX ORDER — DILTIAZEM HYDROCHLORIDE 5 MG/ML
10 INJECTION INTRAVENOUS
Status: COMPLETED | OUTPATIENT
Start: 2019-09-07 | End: 2019-09-07

## 2019-09-07 RX ORDER — HYDROCODONE BITARTRATE AND ACETAMINOPHEN 5; 325 MG/1; MG/1
1 TABLET ORAL EVERY 6 HOURS PRN
Status: DISCONTINUED | OUTPATIENT
Start: 2019-09-07 | End: 2019-09-11

## 2019-09-07 RX ORDER — OXYCODONE AND ACETAMINOPHEN 5; 325 MG/1; MG/1
1 TABLET ORAL DAILY PRN
COMMUNITY
End: 2020-02-19 | Stop reason: DRUGHIGH

## 2019-09-07 RX ORDER — SODIUM CHLORIDE 0.9 % (FLUSH) 0.9 %
2 SYRINGE (ML) INJECTION
Status: DISCONTINUED | OUTPATIENT
Start: 2019-09-07 | End: 2019-09-12 | Stop reason: HOSPADM

## 2019-09-07 RX ORDER — ISOSORBIDE MONONITRATE 30 MG/1
30 TABLET, EXTENDED RELEASE ORAL DAILY
Status: DISCONTINUED | OUTPATIENT
Start: 2019-09-08 | End: 2019-09-12 | Stop reason: HOSPADM

## 2019-09-07 RX ORDER — ACETAMINOPHEN 325 MG/1
650 TABLET ORAL EVERY 4 HOURS PRN
Status: DISCONTINUED | OUTPATIENT
Start: 2019-09-07 | End: 2019-09-12 | Stop reason: HOSPADM

## 2019-09-07 RX ADMIN — DILTIAZEM HYDROCHLORIDE 5 MG/HR: 5 INJECTION INTRAVENOUS at 04:09

## 2019-09-07 RX ADMIN — ENOXAPARIN SODIUM 40 MG: 100 INJECTION SUBCUTANEOUS at 08:09

## 2019-09-07 RX ADMIN — SODIUM CHLORIDE 1710 ML: 0.9 INJECTION, SOLUTION INTRAVENOUS at 04:09

## 2019-09-07 RX ADMIN — PIPERACILLIN AND TAZOBACTAM 3.38 G: 3; .375 INJECTION, POWDER, LYOPHILIZED, FOR SOLUTION INTRAVENOUS; PARENTERAL at 05:09

## 2019-09-07 RX ADMIN — ACETAMINOPHEN 650 MG: 650 SUPPOSITORY RECTAL at 05:09

## 2019-09-07 RX ADMIN — DILTIAZEM HYDROCHLORIDE 10 MG: 5 INJECTION INTRAVENOUS at 04:09

## 2019-09-07 RX ADMIN — DEXTROSE 25 G: 50 INJECTION, SOLUTION INTRAVENOUS at 04:09

## 2019-09-07 RX ADMIN — VANCOMYCIN HYDROCHLORIDE 1250 MG: 1 INJECTION, POWDER, LYOPHILIZED, FOR SOLUTION INTRAVENOUS at 06:09

## 2019-09-07 NOTE — ED PROVIDER NOTES
Encounter Date: 9/7/2019       History     Chief Complaint   Patient presents with    Tachycardia     family reports that she was sent over by home health for fast heart rate     Patient with history of end-stage renal disease.  She does have atrial fibrillation.  Also history of calciphylaxis.  Patient with feeling like her heart racing prior to arrival.  Patient does have history of atrial fibrillation.  Multiple similar symptoms in the past.  Patient is now becoming more confused.  This is similar to previous episodes of metabolic encephalopathy.  No reported fever at home.  No productive cough.  No focal weakness.  There is no head injury, syncope, or seizure-like activity.        Review of patient's allergies indicates:  No Known Allergies  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    both legs    CHF (congestive heart failure)     Encounter for blood transfusion 03/2016    Gout     Hypertension     Mitral valve regurgitation     Osteoarthritis     Pancreatitis     Peripheral vascular disease     Peritoneal dialysis catheter in place     Pneumonia 09/09/2017    Renal failure      Past Surgical History:   Procedure Laterality Date    ACHILLES TENDON SURGERY Right     APPENDECTOMY      CARDIAC CATHETERIZATION  07/03/2017    CHOLECYSTECTOMY      heal surgery Right     HYSTERECTOMY      INSERTION-PERITONEAL DIALYSIS CATHETER-LAPAROSCOPIC N/A 4/26/2016    Performed [facility] at [facility] OR    PARATHYROIDECTOMY      PARATHYROIDECTOMY  07/13/2017    PARATHYROIDECTOMY N/A 7/13/2017    Performed by [facility] at [facility] OR    PERITONEAL CATHETER INSERTION      REIMPLANTATION-PARATHYROID TISSUE N/A 7/13/2017    Performed by [facility] at [facility] OR    REMOVAL-CATHETER-DIALYSIS-PERITONEAL N/A 9/19/2017    Performed by [facility]ough[facility] at [facility] OR    RENAL BIOPSY      vocal cord nodule       Family History   Problem  Relation Age of Onset    Heart disease Sister     Early death Sister         heart as baby    Heart disease Maternal Grandfather     Diabetes Mother     Hypertension Mother     Heart failure Mother     Heart disease Mother     Arthritis Mother     Diabetes Father     Early death Sister         infant     Social History     Tobacco Use    Smoking status: Current Some Day Smoker     Packs/day: 0.50     Years: 45.00     Pack years: 22.50    Smokeless tobacco: Never Used   Substance Use Topics    Alcohol use: No    Drug use: No     Review of Systems   Constitutional: Negative for chills and fever.   HENT: Negative for congestion.    Eyes: Negative for visual disturbance.   Respiratory: Positive for cough and shortness of breath.    Cardiovascular: Positive for palpitations. Negative for chest pain.   Gastrointestinal: Negative for abdominal pain and vomiting.   Genitourinary: Negative for dysuria.   Musculoskeletal: Negative for joint swelling.   Neurological: Negative for headaches.   Psychiatric/Behavioral: Positive for confusion.       Physical Exam     Initial Vitals [09/07/19 1536]   BP Pulse Resp Temp SpO2   (!) 136/103 (!) 152 19 (!) 101 °F (38.3 °C) (!) 93 %      MAP       --         Physical Exam    Nursing note and vitals reviewed.  Constitutional: She is not diaphoretic. No distress.   HENT:   Head: Normocephalic and atraumatic.   Eyes: Conjunctivae are normal.   Neck: Normal range of motion.   Cardiovascular:   Tachycardic irregular rhythm   Pulmonary/Chest: Breath sounds normal.   Abdominal: Soft. There is no tenderness.   Musculoskeletal: Normal range of motion.   Neurological: She is alert.   Patient is moaning but does follow commands.  Moves all extremities with no problems.   Skin:   Left upper inner thigh with large ulcer with no surrounding erythema.  Healing ulcers to right leg.   Psychiatric:   Patient does follow commands.  Patient in obvious pain.         ED Course    Procedures  Labs Reviewed   APTT - Abnormal; Notable for the following components:       Result Value    aPTT 34.8 (*)     All other components within normal limits   CBC W/ AUTO DIFFERENTIAL - Abnormal; Notable for the following components:    RBC 3.15 (*)     Hemoglobin 9.2 (*)     Hematocrit 29.4 (*)     Mean Corpuscular Hemoglobin Conc 31.3 (*)     RDW 24.6 (*)     Immature Grans (Abs) 0.05 (*)     Mono # 1.5 (*)     Lymph% 12.5 (*)     Mono% 16.0 (*)     All other components within normal limits   PROTIME-INR - Abnormal; Notable for the following components:    PT 17.0 (*)     All other components within normal limits   TROPONIN I - Abnormal; Notable for the following components:    Troponin I 0.098 (*)     All other components within normal limits    Narrative:     Troponin critical result(s) called and verbal readback obtained from   Dr. Gabriel/ED, 09/07/2019 17:21   HEPATIC FUNCTION PANEL - Abnormal; Notable for the following components:    Albumin 3.0 (*)     Total Bilirubin 1.3 (*)     Bilirubin, Direct 0.4 (*)     AST 55 (*)     All other components within normal limits   LACTIC ACID, PLASMA - Abnormal; Notable for the following components:    Lactate (Lactic Acid) 6.4 (*)     All other components within normal limits    Narrative:     Lactic Acid critical result(s) called and verbal readback obtained   from Dr. Lennon/ED, 09/07/2019 17:23   ISTAT PROCEDURE - Abnormal; Notable for the following components:    POC Glucose 40 (*)     POC BUN 42 (*)     POC Creatinine 8.1 (*)     POC Anion Gap 18 (*)     POC Ionized Calcium 1.01 (*)     POC Hematocrit 31 (*)     All other components within normal limits   ISTAT PROCEDURE - Abnormal; Notable for the following components:    POC PO2 24 (*)     POC HCO3 29.0 (*)     POC SATURATED O2 44 (*)     POC TCO2 30 (*)     All other components within normal limits   POCT GLUCOSE - Abnormal; Notable for the following components:    POC Glucose 152 (*)     All other  components within normal limits   CULTURE, BLOOD   CULTURE, BLOOD   LIPASE   MAGNESIUM   TSH   CK-MB   CK   ISTAT CHEM8   POCT GLUCOSE MONITORING CONTINUOUS        ECG Results          EKG 12-lead (In process)  Result time 09/07/19 15:51:08    In process by Interface, Lab In [facility] (09/07/19 15:51:08)                 Narrative:    Test Reason : R53.1,    Vent. Rate : 146 BPM     Atrial Rate : 122 BPM     P-R Int : 000 ms          QRS Dur : 088 ms      QT Int : 316 ms       P-R-T Axes : 000 -17 127 degrees     QTc Int : 492 ms    Atrial fibrillation with rapid ventricular response  Nonspecific ST and T wave abnormality  Abnormal ECG  When compared with ECG of 06-AUG-2019 17:59,  T wave inversion no longer evident in Inferior leads    Referred By: [email or web address]   SELF           Confirmed By:                             Imaging Results          X-Ray Chest AP Portable (Final result)  Result time 09/07/19 16:22:58    Final result by Alonso Bain MD (09/07/19 16:22:58)                 Impression:      Cardiomegaly and findings suggestive of mild pulmonary vascular congestion/trace edema with a trace right-sided pleural effusion and adjacent atelectasis.      Electronically signed by: Alonso Bain MD  Date:    09/07/2019  Time:    16:22             Narrative:    CLINICAL HISTORY:  ([id number])75 y/o  (1945) F    Fever;    TECHNIQUE:  (A#[id number], exam time 9/7/2019 16:21)    XR CHEST AP PORTABLE [id number]    COMPARISON:  None available.    FINDINGS:  Mildly increased central hilar interstitial opacities are seen bilaterally suggestive of either mild edema  atypical infection/pneumonia or bronchitis in the appropriate clinical setting. No consolidative pneumonia is seen. There is blunting of the right costophrenic angle consistent with trace pleural effusion. No pneumothorax is identified. The cardiac silhouette is moderately enlarged with an annular aortic valve replacement. Atheromatous calcifications are seen  at the aortic arch. Osseous structures appear unchanged. The visualized upper abdomen is unremarkable.                                 Medical Decision Making:   History:   Old Medical Records: I decided to obtain old medical records.  Clinical Tests:   Lab Tests: Reviewed  Radiological Study: Reviewed  Medical Tests: Reviewed  ED Management:  Patient presents with altered mental status.  Patient is in atrial fibrillation with rapid ventricular rate.  Rectal temperature obtained and patient does have a temperature of  101° F. unsure source of infection.  Blood cultures obtained. Broad-spectrum antibiotics given with Zosyn and vancomycin.  Patient does have elevated lactate level as well. Discussed with Dr. diaz.  He reports given some degree of pulmonary edema already he desires to stop fluid resuscitation with sepsis protocol.  Cardizem given.  Rate is now better controlled.  Patient did have glucose of 40 on chemistry.  D 50 given with repeat Accu-Chek of 15.  Patient remains with altered mental status.  Will check head CT.                      Clinical Impression:       ICD-10-CM ICD-9-CM   1. Sepsis, due to unspecified organism A41.9 038.9     995.91   2. Weakness R53.1 780.79   3. Hypoglycemia E16.2 251.2   4. Atrial fibrillation with rapid ventricular response I48.91 427.31   5. Metabolic encephalopathy G93.41 348.31                                Sudheer Lennon MD  09/07/19 7535

## 2019-09-07 NOTE — ED TRIAGE NOTES
"Present to the with family member, pt's sister Mrs. Noble states " well the nurse came by she said her heart beat was 151, resp 30 and pressure 180"  Family member reports pt alert yesterday, symptoms worsening this am, pt's sister" just talking not making sense" " with the tongue hanging out her mouth" reports confusion, states " she babbles" arrived to the ER, lethargic, confused,  "

## 2019-09-07 NOTE — ED NOTES
Pt's sister, Mrs. Noble reports pt does not make urine, dr. cortés is made aware, verbal order received, cancel urinalysis, cancel cath

## 2019-09-08 LAB
ALBUMIN SERPL BCP-MCNC: 2.7 G/DL (ref 3.5–5.2)
ALP SERPL-CCNC: 102 U/L (ref 55–135)
ALT SERPL W/O P-5'-P-CCNC: 30 U/L (ref 10–44)
AMMONIA PLAS-SCNC: 28 UMOL/L (ref 10–50)
ANION GAP SERPL CALC-SCNC: 15 MMOL/L (ref 8–16)
AST SERPL-CCNC: 72 U/L (ref 10–40)
BASOPHILS # BLD AUTO: 0.04 K/UL (ref 0–0.2)
BASOPHILS NFR BLD: 0.5 % (ref 0–1.9)
BILIRUB SERPL-MCNC: 1.2 MG/DL (ref 0.1–1)
BUN SERPL-MCNC: 54 MG/DL (ref 8–23)
CALCIUM SERPL-MCNC: 8.3 MG/DL (ref 8.7–10.5)
CHLORIDE SERPL-SCNC: 95 MMOL/L (ref 95–110)
CO2 SERPL-SCNC: 29 MMOL/L (ref 23–29)
CREAT SERPL-MCNC: 7.8 MG/DL (ref 0.5–1.4)
DIFFERENTIAL METHOD: ABNORMAL
EOSINOPHIL # BLD AUTO: 0.1 K/UL (ref 0–0.5)
EOSINOPHIL NFR BLD: 0.8 % (ref 0–8)
ERYTHROCYTE [DISTWIDTH] IN BLOOD BY AUTOMATED COUNT: 23.7 % (ref 11.5–14.5)
EST. GFR  (AFRICAN AMERICAN): 5.4 ML/MIN/1.73 M^2
EST. GFR  (NON AFRICAN AMERICAN): 4.7 ML/MIN/1.73 M^2
GLUCOSE SERPL-MCNC: 70 MG/DL (ref 70–110)
GLUCOSE SERPL-MCNC: 80 MG/DL (ref 70–110)
GLUCOSE SERPL-MCNC: 88 MG/DL (ref 70–110)
HCT VFR BLD AUTO: 28.3 % (ref 37–48.5)
HGB BLD-MCNC: 8.8 G/DL (ref 12–16)
IMM GRANULOCYTES # BLD AUTO: 0.06 K/UL (ref 0–0.04)
IMM GRANULOCYTES NFR BLD AUTO: 0.8 % (ref 0–0.5)
LACTATE SERPL-SCNC: 1.5 MMOL/L (ref 0.5–1.9)
LYMPHOCYTES # BLD AUTO: 0.5 K/UL (ref 1–4.8)
LYMPHOCYTES NFR BLD: 6.4 % (ref 18–48)
MAGNESIUM SERPL-MCNC: 2.1 MG/DL (ref 1.6–2.6)
MCH RBC QN AUTO: 28.9 PG (ref 27–31)
MCHC RBC AUTO-ENTMCNC: 31.1 G/DL (ref 32–36)
MCV RBC AUTO: 93 FL (ref 82–98)
MONOCYTES # BLD AUTO: 0.2 K/UL (ref 0.3–1)
MONOCYTES NFR BLD: 3.1 % (ref 4–15)
NEUTROPHILS # BLD AUTO: 6.5 K/UL (ref 1.8–7.7)
NEUTROPHILS NFR BLD: 88.4 % (ref 38–73)
NRBC BLD-RTO: 1 /100 WBC
PHOSPHATE SERPL-MCNC: 4.7 MG/DL (ref 2.7–4.5)
PLATELET # BLD AUTO: 269 K/UL (ref 150–350)
PMV BLD AUTO: 10.3 FL (ref 9.2–12.9)
POTASSIUM SERPL-SCNC: 4.3 MMOL/L (ref 3.5–5.1)
PROCALCITONIN SERPL IA-MCNC: 0.96 NG/ML (ref 0–0.5)
PROT SERPL-MCNC: 7.1 G/DL (ref 6–8.4)
RBC # BLD AUTO: 3.05 M/UL (ref 4–5.4)
SODIUM SERPL-SCNC: 139 MMOL/L (ref 136–145)
VANCOMYCIN SERPL-MCNC: 15.9 UG/ML
WBC # BLD AUTO: 7.33 K/UL (ref 3.9–12.7)

## 2019-09-08 PROCEDURE — 80053 COMPREHEN METABOLIC PANEL: CPT

## 2019-09-08 PROCEDURE — 94640 AIRWAY INHALATION TREATMENT: CPT

## 2019-09-08 PROCEDURE — 25000242 PHARM REV CODE 250 ALT 637 W/ HCPCS: Performed by: INTERNAL MEDICINE

## 2019-09-08 PROCEDURE — 84145 PROCALCITONIN (PCT): CPT

## 2019-09-08 PROCEDURE — 85025 COMPLETE CBC W/AUTO DIFF WBC: CPT

## 2019-09-08 PROCEDURE — 63600175 PHARM REV CODE 636 W HCPCS: Performed by: INTERNAL MEDICINE

## 2019-09-08 PROCEDURE — 83605 ASSAY OF LACTIC ACID: CPT

## 2019-09-08 PROCEDURE — 94761 N-INVAS EAR/PLS OXIMETRY MLT: CPT

## 2019-09-08 PROCEDURE — 21000000 HC CCU ICU ROOM CHARGE

## 2019-09-08 PROCEDURE — 82140 ASSAY OF AMMONIA: CPT

## 2019-09-08 PROCEDURE — 84100 ASSAY OF PHOSPHORUS: CPT

## 2019-09-08 PROCEDURE — 25000003 PHARM REV CODE 250: Performed by: NURSE PRACTITIONER

## 2019-09-08 PROCEDURE — 36415 COLL VENOUS BLD VENIPUNCTURE: CPT

## 2019-09-08 PROCEDURE — 25000003 PHARM REV CODE 250: Performed by: INTERNAL MEDICINE

## 2019-09-08 PROCEDURE — 63600175 PHARM REV CODE 636 W HCPCS

## 2019-09-08 PROCEDURE — 83735 ASSAY OF MAGNESIUM: CPT

## 2019-09-08 PROCEDURE — 80202 ASSAY OF VANCOMYCIN: CPT

## 2019-09-08 RX ORDER — FLUCONAZOLE 2 MG/ML
200 INJECTION, SOLUTION INTRAVENOUS
Status: COMPLETED | OUTPATIENT
Start: 2019-09-08 | End: 2019-09-08

## 2019-09-08 RX ORDER — LORAZEPAM 2 MG/ML
INJECTION INTRAMUSCULAR
Status: COMPLETED
Start: 2019-09-08 | End: 2019-09-08

## 2019-09-08 RX ORDER — MORPHINE SULFATE 4 MG/ML
4 INJECTION, SOLUTION INTRAMUSCULAR; INTRAVENOUS EVERY 4 HOURS PRN
Status: DISCONTINUED | OUTPATIENT
Start: 2019-09-08 | End: 2019-09-11

## 2019-09-08 RX ORDER — METOPROLOL TARTRATE 1 MG/ML
2.5 INJECTION, SOLUTION INTRAVENOUS ONCE
Status: COMPLETED | OUTPATIENT
Start: 2019-09-08 | End: 2019-09-08

## 2019-09-08 RX ORDER — MORPHINE SULFATE 2 MG/ML
1 INJECTION, SOLUTION INTRAMUSCULAR; INTRAVENOUS ONCE
Status: COMPLETED | OUTPATIENT
Start: 2019-09-08 | End: 2019-09-08

## 2019-09-08 RX ORDER — INSULIN ASPART 100 [IU]/ML
1-10 INJECTION, SOLUTION INTRAVENOUS; SUBCUTANEOUS EVERY 6 HOURS PRN
Status: DISCONTINUED | OUTPATIENT
Start: 2019-09-08 | End: 2019-09-12 | Stop reason: HOSPADM

## 2019-09-08 RX ORDER — LORAZEPAM 2 MG/ML
1 INJECTION INTRAMUSCULAR ONCE
Status: COMPLETED | OUTPATIENT
Start: 2019-09-08 | End: 2019-09-08

## 2019-09-08 RX ORDER — ZIPRASIDONE MESYLATE 20 MG/ML
5 INJECTION, POWDER, LYOPHILIZED, FOR SOLUTION INTRAMUSCULAR ONCE
Status: COMPLETED | OUTPATIENT
Start: 2019-09-08 | End: 2019-09-08

## 2019-09-08 RX ORDER — GLUCAGON 1 MG
1 KIT INJECTION
Status: DISCONTINUED | OUTPATIENT
Start: 2019-09-08 | End: 2019-09-12 | Stop reason: HOSPADM

## 2019-09-08 RX ORDER — DEXTROSE MONOHYDRATE AND SODIUM CHLORIDE 5; .9 G/100ML; G/100ML
INJECTION, SOLUTION INTRAVENOUS CONTINUOUS
Status: DISCONTINUED | OUTPATIENT
Start: 2019-09-08 | End: 2019-09-11

## 2019-09-08 RX ADMIN — LORAZEPAM 1 MG: 2 INJECTION INTRAMUSCULAR; INTRAVENOUS at 12:09

## 2019-09-08 RX ADMIN — DILTIAZEM HYDROCHLORIDE 5 MG/HR: 5 INJECTION INTRAVENOUS at 11:09

## 2019-09-08 RX ADMIN — PIPERACILLIN AND TAZOBACTAM 3.38 G: 3; .375 INJECTION, POWDER, LYOPHILIZED, FOR SOLUTION INTRAVENOUS; PARENTERAL at 04:09

## 2019-09-08 RX ADMIN — LORAZEPAM 1 MG: 2 INJECTION INTRAMUSCULAR at 12:09

## 2019-09-08 RX ADMIN — ENOXAPARIN SODIUM 40 MG: 100 INJECTION SUBCUTANEOUS at 04:09

## 2019-09-08 RX ADMIN — BUDESONIDE 0.5 MG: 0.5 INHALANT RESPIRATORY (INHALATION) at 08:09

## 2019-09-08 RX ADMIN — PIPERACILLIN AND TAZOBACTAM 3.38 G: 3; .375 INJECTION, POWDER, LYOPHILIZED, FOR SOLUTION INTRAVENOUS; PARENTERAL at 10:09

## 2019-09-08 RX ADMIN — METOPROLOL TARTRATE 2.5 MG: 1 INJECTION, SOLUTION INTRAVENOUS at 01:09

## 2019-09-08 RX ADMIN — DEXTROSE AND SODIUM CHLORIDE: 5; .9 INJECTION, SOLUTION INTRAVENOUS at 01:09

## 2019-09-08 RX ADMIN — DILTIAZEM HYDROCHLORIDE 5 MG/HR: 5 INJECTION INTRAVENOUS at 05:09

## 2019-09-08 RX ADMIN — FLUCONAZOLE 200 MG: 2 INJECTION, SOLUTION INTRAVENOUS at 05:09

## 2019-09-08 RX ADMIN — ZIPRASIDONE MESYLATE 5 MG: 20 INJECTION, POWDER, LYOPHILIZED, FOR SOLUTION INTRAMUSCULAR at 04:09

## 2019-09-08 RX ADMIN — MORPHINE SULFATE 1 MG: 2 INJECTION, SOLUTION INTRAMUSCULAR; INTRAVENOUS at 10:09

## 2019-09-08 RX ADMIN — MORPHINE SULFATE 4 MG: 4 INJECTION INTRAVENOUS at 10:09

## 2019-09-08 RX ADMIN — BUDESONIDE 0.5 MG: 0.5 INHALANT RESPIRATORY (INHALATION) at 07:09

## 2019-09-08 RX ADMIN — PIPERACILLIN AND TAZOBACTAM 3.38 G: 3; .375 INJECTION, POWDER, LYOPHILIZED, FOR SOLUTION INTRAVENOUS; PARENTERAL at 12:09

## 2019-09-08 RX ADMIN — DILTIAZEM HYDROCHLORIDE 15 MG/HR: 5 INJECTION INTRAVENOUS at 02:09

## 2019-09-08 NOTE — SUBJECTIVE & OBJECTIVE
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    both legs    CHF (congestive heart failure)     Encounter for blood transfusion 03/2016    Gout     Hypertension     Mitral valve regurgitation     Osteoarthritis     Pancreatitis     Peripheral vascular disease     Peritoneal dialysis catheter in place     Pneumonia 09/09/2017    Renal failure        Past Surgical History:   Procedure Laterality Date    ACHILLES TENDON SURGERY Right     APPENDECTOMY      CARDIAC CATHETERIZATION  07/03/2017    CHOLECYSTECTOMY      heal surgery Right     HYSTERECTOMY      INSERTION-PERITONEAL DIALYSIS CATHETER-LAPAROSCOPIC N/A 4/26/2016    [facility]formed [facility] at [facility] OR    PARATHYROIDECTOMY      PARATHYROIDECTOMY  07/13/2017    PARATHYROIDECTOMY N/A 7/13/2017    Performed by [facility]ough[facility] at [facility] OR    PERITONEAL CATHETER INSERTION      REIMPLANTATION-PARATHYROID TISSUE N/A 7/13/2017    Performed by [facility] at [facility] OR    REMOVAL-CATHETER-DIALYSIS-PERITONEAL N/A 9/19/2017    Performed by [facility]ough[facility] at [facility] OR    RENAL BIOPSY      vocal cord nodule         Review of patient's allergies indicates:  No Known Allergies    No current facility-administered medications on file prior to encounter.      Current Outpatient Medications on File Prior to Encounter   Medication Sig    aspirin 81 MG Chew Take 81 mg by mouth once daily.     calcitRIOL (ROCALTROL) 0.25 MCG Cap Take 0.25 mcg by mouth once daily.    diltiaZEM (CARDIZEM CD) 180 MG 24 hr capsule Take 1 capsule (180 mg total) by mouth once daily.    diltiaZEM (CARDIZEM) 60 MG tablet Take 1 tablet (60 mg total) by mouth every 8 (eight) hours.    divalproex ER (DEPAKOTE ER) 250 MG 24 hr tablet Take 750 mg by mouth once daily.    isosorbide mononitrate (IMDUR) 30 MG 24 hr tablet Take 30 mg by mouth once daily.    lacosamide (VIMPAT) 50 mg Tab Take 1 tablet (50 mg total) by  mouth once daily.    levETIRAcetam (KEPPRA) 500 MG Tab Take 750 mg by mouth once daily.     losartan (COZAAR) 25 MG tablet Take 25 mg by mouth once daily.    magnesium oxide (MAG-OX) 400 mg (241.3 mg magnesium) tablet Take 400 mg by mouth once daily.    metoprolol succinate (TOPROL-XL) 50 MG 24 hr tablet Take 1 tablet (50 mg total) by mouth once daily.    fluticasone furoate-vilanterol (BREO) 100-25 mcg/dose diskus inhaler Inhale 1 puff into the lungs once daily. Controller    ondansetron (ZOFRAN) 4 MG tablet Take 4 mg by mouth every 6 (six) hours as needed for Nausea.    oxyCODONE-acetaminophen (PERCOCET) 5-325 mg per tablet Take 1 tablet by mouth every 4 (four) hours as needed for Pain (with dialysis).    sevelamer carbonate (RENVELA) 800 mg Tab Take 800 mg by mouth 3 (three) times daily with meals.    traMADol (ULTRAM) 50 mg tablet Take 50 mg by mouth every 6 (six) hours as needed for Pain.     Family History     Problem Relation (Age of Onset)    Arthritis Mother    Diabetes Mother, Father    Early death Sister, Sister    Heart disease Sister, Maternal Grandfather, Mother    Heart failure Mother    Hypertension Mother        Tobacco Use    Smoking status: Current Some Day Smoker     Packs/day: 0.50     Years: 45.00     Pack years: 22.50    Smokeless tobacco: Never Used   Substance and Sexual Activity    Alcohol use: No    Drug use: No    Sexual activity: Not on file     Review of Systems   Unable to perform ROS: Mental status change     Objective:     Vital Signs (Most Recent):  Temp: 99 °F (37.2 °C) (09/07/19 1826)  Pulse: (!) 117 (09/07/19 2000)  Resp: (!) 25 (09/07/19 1948)  BP: (!) 177/90 (09/07/19 2000)  SpO2: (!) 94 % (09/07/19 2000) Vital Signs (24h Range):  Temp:  [99 °F (37.2 °C)-101 °F (38.3 °C)] 99 °F (37.2 °C)  Pulse:  [115-154] 117  Resp:  [19-33] 25  SpO2:  [91 %-97 %] 94 %  BP: (124-177)/() 177/90     Weight: 58.1 kg (128 lb)  Body mass index is 21.3 kg/m².    Physical Exam    Constitutional: She appears well-developed and well-nourished. No distress.   HENT:   Head: Normocephalic and atraumatic.   Eyes: Pupils are equal, round, and reactive to light. No scleral icterus.   Neck: Neck supple. No thyromegaly present.   Cardiovascular: Normal rate and regular rhythm.   No murmur heard.  Pulmonary/Chest: Effort normal and breath sounds normal. She has no wheezes. She has no rales.   Abdominal: Soft. Bowel sounds are normal. She exhibits no distension.   Musculoskeletal: Normal range of motion. She exhibits no edema.   Neurological: She displays normal reflexes. No cranial nerve deficit.   Drowsy but arousable.    Skin: Skin is warm and dry. Capillary refill takes less than 2 seconds. No rash noted.   Psychiatric: Cognition and memory are impaired.   Nursing note and vitals reviewed.        CRANIAL NERVES     CN III, IV, VI   Pupils are equal, round, and reactive to light.       Significant Labs:   BMP:   Recent Labs   Lab 09/07/19  1600   MG 2.2     CBC:   Recent Labs   Lab 09/07/19  1600 09/07/19  1606   WBC 9.23  --    HGB 9.2*  --    HCT 29.4* 31*     --      CMP:   Recent Labs   Lab 09/07/19  1600   PROT 7.5   ALBUMIN 3.0*   BILITOT 1.3*   ALKPHOS 101   AST 55*   ALT 18     Cardiac Markers: No results for input(s): CKMB, MYOGLOBIN, BNP, TROPISTAT in the last 48 hours.  Lactic Acid:   Recent Labs   Lab 09/07/19  1600   LACTATE 6.4*     Magnesium:   Recent Labs   Lab 09/07/19  1600   MG 2.2     TSH:   Recent Labs   Lab 09/07/19  1600   TSH 2.640     Urine Culture: No results for input(s): LABURIN in the last 48 hours.  Urine Studies: No results for input(s): COLORU, APPEARANCEUA, PHUR, SPECGRAV, PROTEINUA, GLUCUA, KETONESU, BILIRUBINUA, OCCULTUA, NITRITE, UROBILINOGEN, LEUKOCYTESUR, RBCUA, WBCUA, BACTERIA, SQUAMEPITHEL, HYALINECASTS in the last 48 hours.    Invalid input(s): WRIGHTSUR    Significant Imaging: I have reviewed all pertinent imaging results/findings within the past 24  hours.     CT HEAD  Impression    1. No acute intracranial process.  2. Chronic/involutional findings as noted above.      CXR  Impression    Cardiomegaly and findings suggestive of mild pulmonary vascular congestion/trace edema with a trace right-sided pleural effusion and adjacent atelectasis.      EKG  Atrial fibrillation with rapid ventricular response  Nonspecific ST and T wave abnormality

## 2019-09-08 NOTE — ED NOTES
Pt's son, Mr. Jacob contacted as instructed by him prior to leaving, informed pt was transfer to [facility], called at [phone]

## 2019-09-08 NOTE — PROGRESS NOTES
VANCOMYCIN PHARMACOKINETIC NOTE:  Vancomycin Day # 1    Objective/Assessment:    Diagnosis/Indication for Vancomycin:  Sepsis      74 y.o., female; Actual Body Weight = 58 kg (127 lb 13.9 oz).    The patient has the following labs:     9/8/2019 Estimated Creatinine Clearance: 5.8 mL/min (A) (based on SCr of 7.8 mg/dL (H)). Lab Results   Component Value Date    BUN 54 (H) 09/08/2019     Lab Results   Component Value Date    WBC 7.33 09/08/2019        Patient is receiving hemodialysis therapy.    Plan:  Adjust vancomycin dose and/or frequency based on the patient's actual weight and renal function:   Orders have been entered into patient's chart.    Vancomycin dose = 17 mg/kg actual body weight    Vancomycin trough level has been ordered for 9-8-19  18:00    Pharmacy will manage vancomycin therapy, monitor serum vancomycin levels, monitor renal function and adjust regimen as necessary.      Plan for Dialysis Patient:  Give first dose of IV vancomycin.    Vancomycin dose = 17 mg/kg actual body weight    Will follow random vancomycin levels and redose when serum vancomycin level decreases to 10-15 mcg/mL  Thank you for allowing us to participate in this patient's care.     Carlos Robertson 9/8/2019 6:25 PM  Department of Pharmacy  Ext [phone]

## 2019-09-08 NOTE — PROGRESS NOTES
Progress Note  Nephrology    Admit Date: 9/7/2019   LOS: 1 day     SUBJECTIVE:     Follow-up For:  CHELLE    Confused and moaning, appears to have intermittent pain but unable to verbalize   Family at bedside  Severe oral thrush  No need acute dialysis  Volume okay  Anuric      Review of Systems:  PA due to cognitive condition      OBJECTIVE:     Vital Signs (Most Recent)  Temp: 97.6 °F (36.4 °C) (09/08/19 0730)  Pulse: (!) 120 (09/08/19 0850)  Resp: 14 (09/08/19 0850)  BP: 119/72 (09/08/19 0415)  SpO2: 96 % (09/08/19 0850)    Vital Signs Range (Last 24H):  Temp:  [97.6 °F (36.4 °C)-101 °F (38.3 °C)]   Pulse:  []   Resp:  [14-43]   BP: (119-189)/()   SpO2:  [78 %-100 %]     I/O last 3 completed shifts:  In: 201.4 [I.V.:151.4; IV Piggyback:50]  Out: -     Physical Exam:   General: lying on side, taking clothes off/on, agitated trying to pull on lines, moaning unable to understand, was able to state first name only; Thin emaciated  HEENT: No scleral icterus, MM dry with terrible thrush  NECK: No JVD. Supple,  No swelling, No masses.  CV: Irregular rate and rhthym with murmur  PULM: diminished with some crackles right posterior base.  ABD: Soft, nl BS, NT, ND.  EXTR: No edema, clubbing, cyanosis.   NEURO: Non focal and grossly intact.  SKIN: dressing intact left inner thigh; right arm graft positive thrill and bruit with dressing intact  MS: No joint effusions.   PSYCH: PA, agitated      Laboratory:  Recent Labs   Lab 09/08/19  0342   HGB 8.8*   HCT 28.3*   WBC 7.33       Recent Labs   Lab 09/07/19  1600 09/08/19  0342   NA  --  139   K  --  4.3   CL  --  95   CO2  --  29   BUN  --  54*   CREATININE  --  7.8*   GLU  --  80   CALCIUM  --  8.3*   PHOS  --  4.7*   CPK 28  --        Lab Results   Component Value Date    PTH 6.4 (L) 08/09/2019    CALCIUM 8.3 (L) 09/08/2019    CAION 0.84 (L) 07/26/2017    PHOS 4.7 (H) 09/08/2019       No results found for: URICACID    BNP  No results for input(s): BNP,  BNPTRIAGEBLO in the last 168 hours.      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       Nephrology Assessment/Plan:    1.  Altered mental status  Suspect this is from sepsis?  Recommend blood culture from right arm access if fever continues; ? Valve Vegetation with persistent afib?  Defer to hospital medicine     2.  Atrial fibrillation  Defer cardiology, MD at bedside on exam    3.  ESRD  Volume is good and no urgent issues to require dialysis  Plan dialysis on MWF schedule     4.  Calciphylaxis  She is post parathyroidectomy for calciphylaxis  Continue sodium thiosulfate with dialysis  Monitor calcium and phosphorus  No need binders at this time  Continue maintenance calcitriol  Wd care consult to manage wd left inner thigh     5.  Anemia  Below ESRD goal  ESAs with dialysis    6. Thrush  Treat nystating swish spit or paint tongue?   Defer primary care, discussed with nurse Alejandro    7. ? Abd pain  Elevated liver transamine and bilirubin  Hx pancreatitis, recheck lipase level?  US/CT abd?      RL Chung/Dr Ingram

## 2019-09-08 NOTE — ED NOTES
Attempt made to call reports, nurse kay reports pt is now going to a new room closer to the nurse's station, currently room is not available, changed in room assignment per charge nurse SOFIE Alvarez charge nurse made aware

## 2019-09-08 NOTE — NURSING
PT ARRIVES TO ROOM/UNIT ALERT BUT CONFUSED AND UNCOOPERATIVE AND COMBATIVE WITH STAFF.  SHE IS YELLING AND MOANING, ATTEMPTS AT REDIRECTING PT ARE UNSUCCESSFUL AND SHE RESISTS STAFF WHEN TRYING TO PROVIDE CARE FOR PT.  SHE ALSO FIGHTS WITH STAFF WHILE ATTEMPTING TO CHANGE HER BRIEF THAT IS SOILED WITH STOOL, SHE HAS EVEN PUT HER HANDS IN IT AND WIPED IT ALL OVER THE BED AND GOWN.      MD CONTACTED, MEDICATIONS GIVEN PER MAR.    CONTINUE TO MONITOR

## 2019-09-08 NOTE — CONSULTS
[facility]  Cardiology  Consult    Patient Name: Griselda Neely  MRN: [id number]  Admission Date: 9/7/2019  Code Status: Full Code   Attending Provider: Amos Snyder MD   Primary Care Physician: Kalie Neely MD  Principal Problem:Severe sepsis    Patient information was obtained from patient and ER records.     Subjective:     Chief Complaint:  PAF with RVR    HPI:   74 year old female with multiple co morbidities. She has ESRD on HD MWF, PAF, Severe MR, and CHF. Family at bedside says she was able to walk by herself etc however currently she is only moaning in pain. She failed her swallow study. She is confused and whaling around in pain.  Patient has been in chronic atrial fibrillation on intravenous Cardizem drip at this time.  Apparently she was seen by her nephrologist and her cardiologist and Dr. Reyes  Patient is also currently getting treated with wound care for calcinosis in her leg.  There is no reported history of fevers or chills      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    both legs    CHF (congestive heart failure)     Encounter for blood transfusion 03/2016    Gout     Hypertension     Mitral valve regurgitation     Osteoarthritis     Pancreatitis     Peripheral vascular disease     Peritoneal dialysis catheter in place     Pneumonia 09/09/2017    Renal failure        Past Surgical History:   Procedure Laterality Date    ACHILLES TENDON SURGERY Right     APPENDECTOMY      CARDIAC CATHETERIZATION  07/03/2017    CHOLECYSTECTOMY      heal surgery Right     HYSTERECTOMY      INSERTION-PERITONEAL DIALYSIS CATHETER-LAPAROSCOPIC N/A 4/26/2016    Performed by Leah Cortes [facility] at [facility] [place]    PARATHYROIDECTOMY      PARATHYROIDECTOMY  07/13/2017    PARATHYROIDECTOMY N/A 7/13/2017    Performed by Jhoan Willoughby MD at [facility] [place]    PERITONEAL CATHETER INSERTION      REIMPLANTATION-PARATHYROID TISSUE N/A 7/13/2017     Performed by [facility]    REMOVAL-CATHETER-DIALYSIS-PERITONEAL N/A 9/19/2017    Performed by [facility]ough[facility] at [facility] OR    RENAL BIOPSY      vocal cord nodule         Review of patient's allergies indicates:  No Known Allergies    No current facility-administered medications on file prior to encounter.      Current Outpatient Medications on File Prior to Encounter   Medication Sig    aspirin 81 MG Chew Take 81 mg by mouth once daily.     calcitRIOL (ROCALTROL) 0.25 MCG Cap Take 0.25 mcg by mouth once daily.    diltiaZEM (CARDIZEM CD) 180 MG 24 hr capsule Take 1 capsule (180 mg total) by mouth once daily.    diltiaZEM (CARDIZEM) 60 MG tablet Take 1 tablet (60 mg total) by mouth every 8 (eight) hours.    divalproex ER (DEPAKOTE ER) 250 MG 24 hr tablet Take 750 mg by mouth once daily.    isosorbide mononitrate (IMDUR) 30 MG 24 hr tablet Take 30 mg by mouth once daily.    lacosamide (VIMPAT) 50 mg Tab Take 1 tablet (50 mg total) by mouth once daily.    levETIRAcetam (KEPPRA) 500 MG Tab Take 750 mg by mouth once daily.     losartan (COZAAR) 25 MG tablet Take 25 mg by mouth once daily.    magnesium oxide (MAG-OX) 400 mg (241.3 mg magnesium) tablet Take 400 mg by mouth once daily.    metoprolol succinate (TOPROL-XL) 50 MG 24 hr tablet Take 1 tablet (50 mg total) by mouth once daily.    fluticasone furoate-vilanterol (BREO) 100-25 mcg/dose diskus inhaler Inhale 1 puff into the lungs once daily. Controller    ondansetron (ZOFRAN) 4 MG tablet Take 4 mg by mouth every 6 (six) hours as needed for Nausea.    oxyCODONE-acetaminophen (PERCOCET) 5-325 mg per tablet Take 1 tablet by mouth every 4 (four) hours as needed for Pain (with dialysis).    sevelamer carbonate (RENVELA) 800 mg Tab Take 800 mg by mouth 3 (three) times daily with meals.    traMADol (ULTRAM) 50 mg tablet Take 50 mg by mouth every 6 (six) hours as needed for Pain.     Family History     Problem Relation (Age  of Onset)    Arthritis Mother    Diabetes Mother, Father    Early death Sister, Sister    Heart disease Sister, Maternal Grandfather, Mother    Heart failure Mother    Hypertension Mother        Tobacco Use    Smoking status: Current Some Day Smoker     Packs/day: 0.50     Years: 45.00     Pack years: 22.50    Smokeless tobacco: Never Used   Substance and Sexual Activity    Alcohol use: No    Drug use: No    Sexual activity: Not on file       REVIEW OF SYSTEMS:    Constitutional: Negative for chills, fatigue and fever.   Eyes: No double vision, No blurred vision  Neuro: No headaches, No dizziness  Respiratory: Negative for cough, shortness of breath and wheezing.    Cardiovascular: Negative for chest pain. Negative for palpitations and leg swelling.   Gastrointestinal: Negative for abdominal pain, No melena, diarrhea, nausea and vomiting.   Genitourinary: Negative for dysuria and frequency, Negative for hematuria  Skin: Negative for bruising, Negative for edema or discoloration noted.   Endocrine: Negative for polyphagia, Negative for heat intolerance, Negative for cold intolerance  Psychiatric: Negative for depression, Negative for anxiety, Negative for memory loss  Musculoskeletal: Negative for neck pain, Negative for muscle weakness, Negative for back pain     Objective:     Vital Signs (Most Recent):  Temp: 97.6 °F (36.4 °C) (09/08/19 0730)  Pulse: (!) 120 (09/08/19 0850)  Resp: 14 (09/08/19 0850)  BP: 119/72 (09/08/19 0415)  SpO2: 96 % (09/08/19 0850) Vital Signs (24h Range):  Temp:  [97.6 °F (36.4 °C)-101 °F (38.3 °C)] 97.6 °F (36.4 °C)  Pulse:  [] 120  Resp:  [14-43] 14  SpO2:  [78 %-100 %] 96 %  BP: (119-189)/() 119/72     Weight: 58 kg (127 lb 13.9 oz)  Body mass index is 21.28 kg/m².    SpO2: 96 %  O2 Device (Oxygen Therapy): nasal cannula(Pt wears home O2 2.5- 3 lpm)      Intake/Output Summary (Last 24 hours) at 9/8/2019 1142  Last data filed at 9/8/2019 0255  Gross per 24 hour   Intake  201.42 ml   Output --   Net 201.42 ml       Lines/Drains/Airways     Drain                 Hemodialysis AV Fistula 08/08/19 0214 Right upper arm 31 days          Peripheral Intravenous Line                 Peripheral IV - Single Lumen 09/07/19 1555 20 G Left Hand less than 1 day         Peripheral IV - Single Lumen 09/07/19 1700 22 G Left Wrist less than 1 day         Peripheral IV - Single Lumen 09/07/19 1710 22 G Left Forearm less than 1 day                PHYSICAL EXAM:    GENERAL:thinly built female  HEENT: Normocephalic.   NECK: No JVD. No bruit..   THYROID: Thyroid not enlarged. No nodules present..   CARDIAC: Irregular Grade 3 systolic murmur  CHEST ANATOMY: normal.   LUNGS: Diminished   ABDOMEN: Soft no masses or organomegaly.  No abdomen pulsations or bruits.  Normal bowel sounds. No pulsations and no masses felt, No guarding or rebound.   URINARY: No núñez catheter   EXTREMITIES: No cyanosis, clubbing or edema noted at this time., no calf tenderness bilaterally.   PERIPHERAL VASCULAR SYSTEM: Good palpable distal pulses.   CENTRAL NERVOUS SYSTEM: No focal motor or sensory deficits noted.   SKIN: Skin without lesions, moist, well perfused. Dressing CDI  MUSCLE STRENGTH & TONE: No noteable weakness, atrophy or abnormal movement.       Significant Labs:     Results for SRIKANTH LUONG (MRN [id number]) as of 9/8/2019 11:42   Ref. Range 9/8/2019 03:42   WBC Latest Ref Range: 3.90 - 12.70 K/uL 7.33   RBC Latest Ref Range: 4.00 - 5.40 M/uL 3.05 (L)   Hemoglobin Latest Ref Range: 12.0 - 16.0 g/dL 8.8 (L)   Hematocrit Latest Ref Range: 37.0 - 48.5 % 28.3 (L)   MCV Latest Ref Range: 82 - 98 fL 93   MCH Latest Ref Range: 27.0 - 31.0 pg 28.9   MCHC Latest Ref Range: 32.0 - 36.0 g/dL 31.1 (L)   RDW Latest Ref Range: 11.5 - 14.5 % 23.7 (H)   Platelets Latest Ref Range: 150 - 350 K/uL 269   MPV Latest Ref Range: 9.2 - 12.9 fL 10.3   Gran% Latest Ref Range: 38.0 - 73.0 % 88.4 (H)   Gran # (ANC) Latest Ref Range: 1.8 - 7.7  K/uL 6.5   Lymph% Latest Ref Range: 18.0 - 48.0 % 6.4 (L)   Lymph # Latest Ref Range: 1.0 - 4.8 K/uL 0.5 (L)   Mono% Latest Ref Range: 4.0 - 15.0 % 3.1 (L)   Mono # Latest Ref Range: 0.3 - 1.0 K/uL 0.2 (L)   Eosinophil% Latest Ref Range: 0.0 - 8.0 % 0.8   Eos # Latest Ref Range: 0.0 - 0.5 K/uL 0.1   Basophil% Latest Ref Range: 0.0 - 1.9 % 0.5   Baso # Latest Ref Range: 0.00 - 0.20 K/uL 0.04   nRBC Latest Ref Range: 0 /100 WBC 1 (A)   Differential Method Unknown Automated   Immature Grans (Abs) Latest Ref Range: 0.00 - 0.04 K/uL 0.06 (H)   Immature Granulocytes Latest Ref Range: 0.0 - 0.5 % 0.8 (H)   Sodium Latest Ref Range: 136 - 145 mmol/L 139   Potassium Latest Ref Range: 3.5 - 5.1 mmol/L 4.3   Chloride Latest Ref Range: 95 - 110 mmol/L 95   CO2 Latest Ref Range: 23 - 29 mmol/L 29   Anion Gap Latest Ref Range: 8 - 16 mmol/L 15   BUN, Bld Latest Ref Range: 8 - 23 mg/dL 54 (H)   Creatinine Latest Ref Range: 0.5 - 1.4 mg/dL 7.8 (H)   eGFR if non African American Latest Ref Range: >60 mL/min/1.73 m^2 4.7 (A)   eGFR if African American Latest Ref Range: >60 mL/min/1.73 m^2 5.4 (A)   Glucose Latest Ref Range: 70 - 110 mg/dL 80   Calcium Latest Ref Range: 8.7 - 10.5 mg/dL 8.3 (L)   Phosphorus Latest Ref Range: 2.7 - 4.5 mg/dL 4.7 (H)   Magnesium Latest Ref Range: 1.6 - 2.6 mg/dL 2.1   Alkaline Phosphatase Latest Ref Range: 55 - 135 U/L 102   PROTEIN TOTAL Latest Ref Range: 6.0 - 8.4 g/dL 7.1   Albumin Latest Ref Range: 3.5 - 5.2 g/dL 2.7 (L)   BILIRUBIN TOTAL Latest Ref Range: 0.1 - 1.0 mg/dL 1.2 (H)   AST Latest Ref Range: 10 - 40 U/L 72 (H)   ALT Latest Ref Range: 10 - 44 U/L 30   Lactate, Ned Latest Ref Range: 0.5 - 1.9 mmol/L 1.5       Significant Imaging:   ([id number] y/o  (1945) F    Fever;    TECHNIQUE:  (A#[id number], exam time 9/7/2019 16:21)    XR CHEST AP PORTABLE [id number]    COMPARISON:  None available.    FINDINGS:  Mildly increased central hilar interstitial opacities are seen bilaterally suggestive of  either mild edema  atypical infection/pneumonia or bronchitis in the appropriate clinical setting. No consolidative pneumonia is seen. There is blunting of the right costophrenic angle consistent with trace pleural effusion. No pneumothorax is identified. The cardiac silhouette is moderately enlarged with an annular aortic valve replacement. Atheromatous calcifications are seen at the aortic arch. Osseous structures appear unchanged. The visualized upper abdomen is unremarkable.      Impression       Cardiomegaly and findings suggestive of mild pulmonary vascular congestion/trace edema with a trace right-sided pleural effusion and adjacent atelectasis.      Electronically signed by: Alonso Bain MD  Date: 09/07/2019  Time: 16:22   Vent. Rate : 146 BPM     Atrial Rate : 122 BPM     P-R Int : 000 ms          QRS Dur : 088 ms      QT Int : 316 ms       P-R-T Axes : 000 -17 127 degrees     QTc Int : 492 ms    Atrial fibrillation with rapid ventricular response  Nonspecific ST and T wave abnormality  Abnormal ECG  When compared with ECG of 06-AUG-2019 17:59,  T wave inversion no longer evident in Inferior leads  Confirmed by Jerrod Asif MD ([id number]) on 9/7/2019 6:24:38 PM    FINDINGS:  No acute intracranial hemorrhage, edema or mass effect, and no acute parenchymal abnormality. There is no hydrocephalus, herniation or midline shift, and the basal and suprasellar cisterns are within normal limits. The osseous structures show no acute skull fracture. Mild diffuse cerebral and cerebellar atrophy for age with moderate periventricular deep cerebral white matter low attenuation  nonspecific findings which can be seen in any diffuse white matter process but most commonly associated with chronic microvascular ischemic disease. There are scattered atheromatous calcifications in the intracranial internal carotid arteries. Orbital contents appear within normal limits. External auditory canals are unremarkable. The visualized  paranasal sinuses and mastoid air cells are essentially clear.      Impression       1. No acute intracranial process.    2. Chronic/involutional findings as noted above.       FINDINGS:  Mildly increased central hilar interstitial opacities are seen bilaterally suggestive of either mild edema  atypical infection/pneumonia or bronchitis in the appropriate clinical setting. No consolidative pneumonia is seen. There is blunting of the right costophrenic angle consistent with trace pleural effusion. No pneumothorax is identified. The cardiac silhouette is moderately enlarged with an annular aortic valve replacement. Atheromatous calcifications are seen at the aortic arch. Osseous structures appear unchanged. The visualized upper abdomen is unremarkable.  I HAVE REVIEWED :    The vital signs, nurses notes, and all the pertinent radiology and labs.     2017 Prior Recommendations from Dr. Andrzej Bejarano         1. Paroxysmal atrial fibrillation    2. Essential hypertension    3. ESRD (end stage renal disease): On PD.    4. Hyperparathyroidism    5. Type 2 diabetes mellitus with chronic kidney disease on chronic dialysis, without long-term current use of insulin    6. Peritoneal dialysis catheter in place    7. Non-rheumatic mitral regurgitation: by exam and echo report.  Current echo pending.   8. Pulmonary hypertension    9. PND (paroxysmal nocturnal dyspnea)    10.    Diastolic HF by history with concentric LVH, MR, and TR.  NYHA Class II now.    Plan:            Addendum: Echo reviewed.  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  This is an inoperable situation.  She isn't a candidate for mitral clip because of MS.  She isn't a candidate for MVR because of MAC and ESRD on HD. The best management for her is to keep her HR as slow as possible using a combination of calcium channel blockers and beta blockers for both the HOCM and MS.  Will send this recommendation to Dr. Leonel Barr.   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 AF with RVR  -  Continue Cardizem gtt  - Lopressor IV x 1 now  - Pateint failed swallow study   - Continue Lovenox    2. Severe MR and MS  -Inoperable see Dr. Bejarano notes above  -per his note "Addendum: Echo reviewed.  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  This is an inoperable situation.  She isn't a candidate for mitral clip because of MS.  She isn't a candidate for MVR because of MAC and ESRD on HD. The best management for her is to keep her HR as slow as possible using a combination of calcium channel blockers and beta blockers for both the HOCM and MS.  Will send this recommendation to Dr. Leonel Barr.  "      3. ESRD on HD  - Per Nephrology    4. DM Type 2  - Per Hospital Medicine    5. HOCM  - Continue current treatment for now    6. Confusion- etiology?  - Possibly from sepsis, no source Identified    7. Chronic Anemia secondary to ESRD  - Stable    8. Calciphylaxis of left extremity  - Per PCP    9. Thrush  -Per PCP    10. Lactic Acidosis 6.4- Improved today to 1.5  - On broad spectrum ABX,  Need  to workup for possible septicemia contributed to confusion as well as metabolic abnormalities.    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 [E83.59, L97.922] 07/20/2017 Yes    Atrial fibrillation with RVR [I48.91] 07/18/2017 Unknown    ESRD (end stage renal disease) [N18.6] 05/11/2016 Yes      Problems Resolved During this Admission:           VTE Risk Mitigation (From admission, onward)        Ordered     enoxaparin injection 40 mg  Daily      09/07/19 1943     IP VTE HIGH RISK PATIENT  Once      09/07/19 1943          Mary Do, " NP  Cardiology   [facility]      I have personally interviewed and examined the patient, I have reviewed the Nurse Practitioner's history and physical, assessment, and plan. I have personally evaluated the patient at bedside and agree with the findings.

## 2019-09-08 NOTE — ED NOTES
Assigned floor called for reports, change in bed assignment by charge nurse Kim, instructed to call back in approx 10mins to speak to eliza

## 2019-09-08 NOTE — CARE UPDATE
This note also relates to the following rows which could not be included:  SpO2 - Cannot attach notes to unvalidated device data  Pulse - Cannot attach notes to unvalidated device data       09/07/19 8066   Patient Assessment/Suction   Level of Consciousness (AVPU) responds to voice   Respiratory Effort Unlabored;Shallow   Expansion/Accessory Muscles/Retractions expansion symmetric;no use of accessory muscles   All Lung Fields Breath Sounds diminished   PRE-TX-O2   O2 Device (Oxygen Therapy) nasal cannula  (Pt wears home O2 2.5- 3 lpm)   $ Is the patient on Low Flow Oxygen? Yes   Flow (L/min) 2   Resp (!) 22   Pt will achieve and maintain optimal cardiopulmonary status.

## 2019-09-08 NOTE — H&P
[facility] Medicine  History & Physical    Patient Name: Griselda Neely  MRN: [id number]  Admission Date: 9/7/2019  Attending Physician: Lindsay Garcia MD  Primary Care Provider: Kalie Neely MD         Patient information was obtained from relative(s), caregiver / friend and ER records.     Subjective:     Principal Problem:Severe sepsis    Chief Complaint:   Chief Complaint   Patient presents with    Tachycardia     family reports that she was sent over by home health for fast heart rate        HPI: 74 year old female with PMH of ESRD-DD, Atrial fibrillation,  who was brought in with complaints of tachycardia. Son is at bedside and provides most of the history as paitent  is altered. He states that at baseline she is able to get up from bed and move around the house. However since last night she has been confused and lethargic   He denies any history of fevers at home. The patient is drowsy but arousable. Responds to painful stimuli, but does not follow commands.  On arrival to the ER, she was febrile with a rectal temp of 101, tachycardic into the 150s, telemetry monitoring shows atrial fibrillation. She also has an elevated lactate level.     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    both legs    CHF (congestive heart failure)     Encounter for blood transfusion 03/2016    Gout     Hypertension     Mitral valve regurgitation     Osteoarthritis     Pancreatitis     Peripheral vascular disease     Peritoneal dialysis catheter in place     Pneumonia 09/09/2017    Renal failure        Past Surgical History:   Procedure Laterality Date    ACHILLES TENDON SURGERY Right     APPENDECTOMY      CARDIAC CATHETERIZATION  07/03/2017    CHOLECYSTECTOMY      heal surgery Right     HYSTERECTOMY      INSERTION-PERITONEAL DIALYSIS CATHETER-LAPAROSCOPIC N/A 4/26/2016    Performed by [facility] at [facility] OR    PARATHYROIDECTOMY       PARATHYROIDECTOMY  07/13/2017    PARATHYROIDECTOMY N/A 7/13/2017    [facility] at [facility] OR    PERITONEAL CATHETER INSERTION      REIMPLANTATION-PARATHYROID TISSUE N/A 7/13/2017    [facility] at [facility] OR    REMOVAL-CATHETER-DIALYSIS-PERITONEAL N/A 9/19/2017    Per[facility] by [facility] at [facility] OR    RENAL BIOPSY      vocal cord nodule         Review of patient's allergies indicates:  No Known Allergies    No current facility-administered medications on file prior to encounter.      Current Outpatient Medications on File Prior to Encounter   Medication Sig    aspirin 81 MG Chew Take 81 mg by mouth once daily.     calcitRIOL (ROCALTROL) 0.25 MCG Cap Take 0.25 mcg by mouth once daily.    diltiaZEM (CARDIZEM CD) 180 MG 24 hr capsule Take 1 capsule (180 mg total) by mouth once daily.    diltiaZEM (CARDIZEM) 60 MG tablet Take 1 tablet (60 mg total) by mouth every 8 (eight) hours.    divalproex ER (DEPAKOTE ER) 250 MG 24 hr tablet Take 750 mg by mouth once daily.    isosorbide mononitrate (IMDUR) 30 MG 24 hr tablet Take 30 mg by mouth once daily.    lacosamide (VIMPAT) 50 mg Tab Take 1 tablet (50 mg total) by mouth once daily.    levETIRAcetam (KEPPRA) 500 MG Tab Take 750 mg by mouth once daily.     losartan (COZAAR) 25 MG tablet Take 25 mg by mouth once daily.    magnesium oxide (MAG-OX) 400 mg (241.3 mg magnesium) tablet Take 400 mg by mouth once daily.    metoprolol succinate (TOPROL-XL) 50 MG 24 hr tablet Take 1 tablet (50 mg total) by mouth once daily.    fluticasone furoate-vilanterol (BREO) 100-25 mcg/dose diskus inhaler Inhale 1 puff into the lungs once daily. Controller    ondansetron (ZOFRAN) 4 MG tablet Take 4 mg by mouth every 6 (six) hours as needed for Nausea.    oxyCODONE-acetaminophen (PERCOCET) 5-325 mg per tablet Take 1 tablet by mouth every 4 (four) hours as needed for Pain (with dialysis).    sevelamer carbonate  (RENVELA) 800 mg Tab Take 800 mg by mouth 3 (three) times daily with meals.    traMADol (ULTRAM) 50 mg tablet Take 50 mg by mouth every 6 (six) hours as needed for Pain.     Family History     Problem Relation (Age of Onset)    Arthritis Mother    Diabetes Mother, Father    Early death Sister, Sister    Heart disease Sister, Maternal Grandfather, Mother    Heart failure Mother    Hypertension Mother        Tobacco Use    Smoking status: Current Some Day Smoker     Packs/day: 0.50     Years: 45.00     Pack years: 22.50    Smokeless tobacco: Never Used   Substance and Sexual Activity    Alcohol use: No    Drug use: No    Sexual activity: Not on file     Review of Systems   Unable to perform ROS: Mental status change     Objective:     Vital Signs (Most Recent):  Temp: 99 °F (37.2 °C) (09/07/19 1826)  Pulse: (!) 117 (09/07/19 2000)  Resp: (!) 25 (09/07/19 1948)  BP: (!) 177/90 (09/07/19 2000)  SpO2: (!) 94 % (09/07/19 2000) Vital Signs (24h Range):  Temp:  [99 °F (37.2 °C)-101 °F (38.3 °C)] 99 °F (37.2 °C)  Pulse:  [115-154] 117  Resp:  [19-33] 25  SpO2:  [91 %-97 %] 94 %  BP: (124-177)/() 177/90     Weight: 58.1 kg (128 lb)  Body mass index is 21.3 kg/m².    Physical Exam   Constitutional: She appears well-developed and well-nourished. No distress.   HENT:   Head: Normocephalic and atraumatic.   Eyes: Pupils are equal, round, and reactive to light. No scleral icterus.   Neck: Neck supple. No thyromegaly present.   Cardiovascular: Normal rate and regular rhythm.   No murmur heard.  Pulmonary/Chest: Effort normal and breath sounds normal. She has no wheezes. She has no rales.   Abdominal: Soft. Bowel sounds are normal. She exhibits no distension.   Musculoskeletal: Normal range of motion. She exhibits no edema.   Neurological: She displays normal reflexes. No cranial nerve deficit.   Drowsy but arousable.    Skin: Skin is warm and dry. Capillary refill takes less than 2 seconds. No rash noted.   Psychiatric:  Cognition and memory are impaired.   Nursing note and vitals reviewed.        CRANIAL NERVES     CN III, IV, VI   Pupils are equal, round, and reactive to light.       Significant Labs:   BMP:   Recent Labs   Lab 09/07/19  1600   MG 2.2     CBC:   Recent Labs   Lab 09/07/19  1600 09/07/19  1606   WBC 9.23  --    HGB 9.2*  --    HCT 29.4* 31*     --      CMP:   Recent Labs   Lab 09/07/19  1600   PROT 7.5   ALBUMIN 3.0*   BILITOT 1.3*   ALKPHOS 101   AST 55*   ALT 18     Cardiac Markers: No results for input(s): CKMB, MYOGLOBIN, BNP, TROPISTAT in the last 48 hours.  Lactic Acid:   Recent Labs   Lab 09/07/19  1600   LACTATE 6.4*     Magnesium:   Recent Labs   Lab 09/07/19  1600   MG 2.2     TSH:   Recent Labs   Lab 09/07/19  1600   TSH 2.640     Urine Culture: No results for input(s): LABURIN in the last 48 hours.  Urine Studies: No results for input(s): COLORU, APPEARANCEUA, PHUR, SPECGRAV, PROTEINUA, GLUCUA, KETONESU, BILIRUBINUA, OCCULTUA, NITRITE, UROBILINOGEN, LEUKOCYTESUR, RBCUA, WBCUA, BACTERIA, SQUAMEPITHEL, HYALINECASTS in the last 48 hours.    Invalid input(s): WRIGHTSUR    Significant Imaging: I have reviewed all pertinent imaging results/findings within the past 24 hours.     CT HEAD  Impression    1. No acute intracranial process.  2. Chronic/involutional findings as noted above.      CXR  Impression    Cardiomegaly and findings suggestive of mild pulmonary vascular congestion/trace edema with a trace right-sided pleural effusion and adjacent atelectasis.      EKG  Atrial fibrillation with rapid ventricular response  Nonspecific ST and T wave abnormality    Assessment/Plan:     * Severe sepsis  Start sepsis protocol - hold the IV fluid requirement per patient's nephrologist  Broad spectrum antibiotics with vancomycin and zosyn  Repeat lactate levels      Acute metabolic encephalopathy  Likely metabolic in etiology  Treat sepsis  CT head normal, no focal neurological deficits      Atrial fibrillation  with RVR  Started on cardizem gtt in the ED  Continue cardizem at 5mcg/hr    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        ESRD (end stage renal disease)  Dialysis per nephrology  Dr. Ingram consulted        VTE Risk Mitigation (From admission, onward)        Ordered     enoxaparin injection 40 mg  Daily      09/07/19 1943     IP VTE HIGH RISK PATIENT  Once      09/07/19 1943             Lindsay Garcia MD  Department of Hospital Medicine   [facility]

## 2019-09-08 NOTE — CONSULTS
Consult Note  Nephrology    Griselda Neely  female  74 y.o.  Consult Requested By: Lindsay Garcia MD  Reason for Consult: ESRD    SUBJECTIVE:     History of Present Illness:  Mrs. Griselda Neely is a 74-year-old woman who I follow for ESRD.  She was referred by her home health nurse because of a rapid heart rate.  According to her family, she she also has been confused and lethargic since this morning.  She was found to have atrial fibrillation and RVR and she is now receiving diltiazem.  The patient also was found to have a fever in the emergency department.  Of note, she he is being treated for calciphylaxis and she has a wound from that on her left inner thigh.  The emergency department physician examined that and stated that it did not appear infected.  The family has noted no cough, sore throat or any fever at home.  She has no urine flow.  I saw her on dialysis yesterday and although she has severe pain of the leg wound she otherwise had no abnormalities.  She receives Percocet but only on dialysis.  Nephrology has been consulted because of her ESRD.    Review Of Systems:  Unable to obtain since she is confused.    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    both legs    CHF (congestive heart failure)     Encounter for blood transfusion 03/2016    Gout     Hypertension     Mitral valve regurgitation     Osteoarthritis     Pancreatitis     Peripheral vascular disease     Peritoneal dialysis catheter in place     Pneumonia 09/09/2017    Renal failure      Past Surgical History:   Procedure Laterality Date    ACHILLES TENDON SURGERY Right     APPENDECTOMY      CARDIAC CATHETERIZATION  07/03/2017    CHOLECYSTECTOMY      heal surgery Right     HYSTERECTOMY      INSERTION-PERITONEAL DIALYSIS CATHETER-LAPAROSCOPIC N/A 4/26/2016    [facility]    PARATHYROIDECTOMY      PARATHYROIDECTOMY  07/13/2017    PARATHYROIDECTOMY  N/A 7/13/2017    Performed by [facility] at [facility] OR    PERITONEAL CATHETER INSERTION      REIMPLANTATION-PARATHYROID TISSUE N/A 7/13/2017    Performed by [facility]ough[facility] at [facility] OR    REMOVAL-CATHETER-DIALYSIS-PERITONEAL N/A 9/19/2017    Performed by [facility] at [facility] OR    RENAL BIOPSY      vocal cord nodule       Family History   Problem Relation Age of Onset    Heart disease Sister     Early death Sister         heart as baby    Heart disease Maternal Grandfather     Diabetes Mother     Hypertension Mother     Heart failure Mother     Heart disease Mother     Arthritis Mother     Diabetes Father     Early death Sister         infant     Social History     Tobacco Use    Smoking status: Current Some Day Smoker     Packs/day: 0.50     Years: 45.00     Pack years: 22.50    Smokeless tobacco: Never Used   Substance Use Topics    Alcohol use: No    Drug use: No      Review of patient's allergies indicates:  No Known Allergies   Prior to Admission medications    Medication Sig Start Date End Date Taking? Authorizing Provider   aspirin 81 MG Chew Take 81 mg by mouth once daily.    Yes Historical Provider, MD   calcitRIOL (ROCALTROL) 0.25 MCG Cap Take 0.25 mcg by mouth once daily.   Yes Historical Provider, MD   diltiaZEM (CARDIZEM CD) 180 MG 24 hr capsule Take 1 capsule (180 mg total) by mouth once daily. 8/10/19 8/9/20 Yes Daren Lea MD   diltiaZEM (CARDIZEM) 60 MG tablet Take 1 tablet (60 mg total) by mouth every 8 (eight) hours. 8/19/19 8/18/20 Yes Amos Snyder MD   divalproex ER (DEPAKOTE ER) 250 MG 24 hr tablet Take 750 mg by mouth once daily.   Yes Historical Provider, MD   isosorbide mononitrate (IMDUR) 30 MG 24 hr tablet Take 30 mg by mouth once daily.   Yes Historical Provider, MD   lacosamide (VIMPAT) 50 mg Tab Take 1 tablet (50 mg total) by mouth once daily. 8/10/19 8/9/20 Yes Daren Lea MD   levETIRAcetam (KEPPRA) 500 MG Tab Take 750 mg  by mouth once daily.    Yes Historical Provider, MD   losartan (COZAAR) 25 MG tablet Take 25 mg by mouth once daily.   Yes Historical Provider, MD   magnesium oxide (MAG-OX) 400 mg (241.3 mg magnesium) tablet Take 400 mg by mouth once daily.   Yes Historical Provider, MD   metoprolol succinate (TOPROL-XL) 50 MG 24 hr tablet Take 1 tablet (50 mg total) by mouth once daily. 8/10/19 8/9/20 Yes Daren Lea MD   fluticasone furoate-vilanterol (BREO) 100-25 mcg/dose diskus inhaler Inhale 1 puff into the lungs once daily. Controller    Historical Provider, MD   ondansetron (ZOFRAN) 4 MG tablet Take 4 mg by mouth every 6 (six) hours as needed for Nausea.    Historical Provider, MD   oxyCODONE-acetaminophen (PERCOCET) 5-325 mg per tablet Take 1 tablet by mouth every 4 (four) hours as needed for Pain (with dialysis).    Historical Provider, MD   sevelamer carbonate (RENVELA) 800 mg Tab Take 800 mg by mouth 3 (three) times daily with meals.    Historical Provider, MD   traMADol (ULTRAM) 50 mg tablet Take 50 mg by mouth every 6 (six) hours as needed for Pain.    Historical Provider, MD           dilTIAZem 20 mg/hr (09/08/19 0353)       aspirin  81 mg Oral Daily    budesonide  0.5 mg Nebulization BID    calcitRIOL  0.25 mcg Oral Daily    diltiaZEM  180 mg Oral Daily    diltiaZEM  60 mg Oral Q8H    divalproex ER  750 mg Oral Daily    enoxaparin  40 mg Subcutaneous Daily    isosorbide mononitrate  30 mg Oral Daily    lacosamide  50 mg Oral Daily    levETIRAcetam  750 mg Oral Daily    losartan  25 mg Oral Daily    magnesium oxide  400 mg Oral Daily    metoprolol succinate  50 mg Oral Daily    piperacillin-tazobactam (ZOSYN) IVPB  3.375 g Intravenous Q8H    senna-docusate 8.6-50 mg  1 tablet Oral BID     acetaminophen, acetaminophen, albuterol sulfate, dextrose 50%, dextrose 50%, dextrose 50%, glucagon (human recombinant), glucose, glucose, HYDROcodone-acetaminophen, HYDROcodone-acetaminophen, ondansetron,  oxyCODONE-acetaminophen, promethazine (PHENERGAN) IVPB, sodium chloride 0.9%, traMADol       OBJECTIVE:     Vital Signs (Most Recent)  Temp: 98.1 °F (36.7 °C) (09/08/19 0415)  Pulse: (!) 130 (09/08/19 0530)  Resp: (!) 30 (09/08/19 0530)  BP: 119/72 (09/08/19 0415)  SpO2: (!) 92 % (09/08/19 0530)    Vital Signs Range (Last 24H):  Temp:  [97.6 °F (36.4 °C)-101 °F (38.3 °C)]   Pulse:  []   Resp:  [18-43]   BP: (119-189)/()   SpO2:  [78 %-100 %]     Physical Exam:  General: Seen in the emergency department.  Thrashing about and moaning but no purposeful activity  HEENT: No scleral icterus, Symmetrical facial movement.   NECK:  No JVD. Supple,  No swelling, No masses.  CV:  Tachycardia with atrial fib on the monitor and a heart rate of 120.  PULM:  Clear without crackles, rhonchi, wheezes.  ABD:  Soft, nl BS, NT, ND.  EXTR:  No edema, clubbing, cyanosis.   NEURO: Moves all extremities.  No focal defects noted.  SKIN:  Inner thigh wound on her left leg bandaged.  MS:  No joint effusions.   PSYCH: Confused.    Laboratory:  Recent Labs   Lab 09/07/19  1600 09/08/19  0342   NA  --  139   K  --  4.3   CL  --  95   CO2  --  29   BUN  --  54*   CREATININE  --  7.8*   GLU  --  80   CALCIUM  --  8.3*   PHOS  --  4.7*   CPK 28  --        Recent Labs   Lab 09/07/19  1600 09/07/19  1606 09/08/19  0342   WBC 9.23  --  7.33   HGB 9.2*  --  8.8*   HCT 29.4* 31* 28.3*     --  269       Active Hospital Problems    Diagnosis  POA    *Severe sepsis [A41.9, R65.20]  Yes    Acute metabolic encephalopathy [G93.41]  Unknown    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 [E83.59, L97.922]  Yes    Atrial fibrillation with RVR [I48.91]  Unknown    ESRD (end stage renal disease) [N18.6]  Yes      Resolved Hospital Problems   No resolved problems to display.       ASSESSMENT/PLAN:     1.  Altered mental status  Suspect this is from sepsis  Defer to hospital medicine for workup and  antibiotic therapy    2.  Atrial fibrillation  Defer to the emergency department and other specialties for therapy    3.  ESRD  Volume is good and no urgent issues to require dialysis  Plan dialysis after she stabilizes and MWF schedule    4.  Calciphylaxis  She is post parathyroidectomy for calciphylaxis  Continue sodium thiosulfate with dialysis  Monitor calcium and phosphorus    5.  Anemia  Near ESRD goal  ESAs with dialysis    Thank you for Allowing us to share in Mrs. Neely' care.  We will follow-up with you while she is here.    Joseph Ingram M.D.

## 2019-09-08 NOTE — NURSING
REVIEWING PT'S CHART FOR PENDING ADMISSION, 4HR REPEAT LACTID ACID DUE AT 2000.  CONFIRMED WITH CHARGE NURSE ROYAL HANSEN, THAT THE REPEAT LEVEL NEEDS TO BE DRAWN & RESULTED PRIOR TO ADMISSION TO THE UNIT.    CALLED ABBY HANSEN IN ER AND INFORMED HER.    DEYSI HANSEN

## 2019-09-08 NOTE — ED NOTES
Pt's son at bedside informed of room assignment, that I will call reports, room number given to family

## 2019-09-08 NOTE — PROGRESS NOTES
[facility]  Progress Note    Patient Name: Griselda Neely  MRN: [id number]  Patient Class: IP- Inpatient   Admission Date: 9/7/2019  Length of Stay: 1 days  Attending Physician: Amos Snyder MD  Primary Care Provider: Kalie Neely MD        Subjective:     Principal Problem:Severe sepsis    Interval History:  Seen examined, patient with severe dementia, nonverbal, moaning apparently she also fell and had some back trauma.    Review of Systems  Objective:     Vital Signs (Most Recent):  Temp: 98 °F (36.7 °C) (09/08/19 1330)  Pulse: (!) 120 (09/08/19 0850)  Resp: 14 (09/08/19 0850)  BP: (!) 140/80 (09/08/19 1330)  SpO2: 96 % (09/08/19 0850) Vital Signs (24h Range):  Temp:  [97.6 °F (36.4 °C)-99 °F (37.2 °C)] 98 °F (36.7 °C)  Pulse:  [] 120  Resp:  [14-43] 14  SpO2:  [78 %-100 %] 96 %  BP: (119-189)/() 140/80     Weight: 58 kg (127 lb 13.9 oz)  Body mass index is 21.28 kg/m².    Intake/Output Summary (Last 24 hours) at 9/8/2019 1621  Last data filed at 9/8/2019 0255  Gross per 24 hour   Intake 201.42 ml   Output --   Net 201.42 ml      Physical Exam  General, dementia severe, nonverbal, moaning in pain, moderate distress  Lungs clear to auscultation bilaterally  Cardiac, regular rhythm and rate, no audible murmurs  Abdomen soft nontender nondistended  Extremities, chronic wound, calciphylaxis        Significant Labs:   BMP:   Recent Labs   Lab 09/08/19  0342   GLU 80      K 4.3   CL 95   CO2 29   BUN 54*   CREATININE 7.8*   CALCIUM 8.3*   MG 2.1     CBC:   Recent Labs   Lab 09/07/19  1600 09/07/19  1606 09/08/19  0342   WBC 9.23  --  7.33   HGB 9.2*  --  8.8*   HCT 29.4* 31* 28.3*     --  269         Assessment/Plan:      Active Diagnoses:    Diagnosis Date Noted POA    PRINCIPAL PROBLEM:  Severe sepsis [A41.9, R65.20] 09/07/2019 Yes    ESRD (end stage renal disease) [N18.6] 05/11/2016 Yes    Acute metabolic encephalopathy [G93.41] 09/07/2019  Unknown    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 [E83.59, L97.922] 07/20/2017 Yes    Atrial fibrillation with RVR [I48.91] 07/18/2017 Unknown      Problems Resolved During this Admission:     VTE Risk Mitigation (From admission, onward)        Ordered     enoxaparin injection 40 mg  Daily      09/07/19 1943     IP VTE HIGH RISK PATIENT  Once      09/07/19 1943      sepsis, suspected present on admission, no clear source of infection except lower extremity wounds  Continue broad-spectrum antibiotics, namely Zosyn and vancomycin  Order procalcitonin, monitor CBCs CMP, monitor wound cultures:  Monitor blood cultures        Acute metabolic encephalopathy  Likely decompensated dementia, due to sepsis  NPO for no  Likely metabolic in etiology  Treat sepsis  CT head normal, no focal neurological deficits        Atrial fibrillation with RVR  Continue Cardizem drip, Cardiology consult appreciate input from Cardiology     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   Possible infection, obtain wound culture, continue broad-spectrum antibiotics, wound consult placed       ESRD (end stage renal disease)  Consult placed for Neurology    Oral thrush, 1 dose of Diflucan given IV, will obtain an Infectious Disease consult, routine       Amos Snyder MD  Department of Hospital Medicine   [facility]

## 2019-09-08 NOTE — ASSESSMENT & PLAN NOTE
Start sepsis protocol - hold the IV fluid requirement per patient's nephrologist  Broad spectrum antibiotics with vancomycin and zosyn  Repeat lactate levels

## 2019-09-08 NOTE — HPI
74 year old female with PMH of ESRD-DD, Atrial fibrillation,  who was brought in with complaints of tachycardia. Son is at bedside and provides most of the history as paitent  is altered. He states that at baseline she is able to get up from bed and move around the house. However since last night she has been confused and lethargic   He denies any history of fevers at home. The patient is drowsy but arousable. Responds to painful stimuli, but does not follow commands.  On arrival to the ER, she was febrile with a rectal temp of 101, tachycardic into the 150s, telemetry monitoring shows atrial fibrillation. She also has an elevated lactate level.

## 2019-09-08 NOTE — ED NOTES
Called received from cardio nurse [place], reports lactic acid is due, that she will not take reports until lactic acid is resulted, per poppy charge nurse, ER charge nurse made aware

## 2019-09-09 LAB
ALBUMIN SERPL BCP-MCNC: 2.7 G/DL (ref 3.5–5.2)
ALP SERPL-CCNC: 89 U/L (ref 55–135)
ALT SERPL W/O P-5'-P-CCNC: 52 U/L (ref 10–44)
ANION GAP SERPL CALC-SCNC: 16 MMOL/L (ref 8–16)
ANION GAP SERPL CALC-SCNC: 16 MMOL/L (ref 8–16)
AST SERPL-CCNC: 82 U/L (ref 10–40)
BASOPHILS # BLD AUTO: 0.03 K/UL (ref 0–0.2)
BASOPHILS # BLD AUTO: 0.03 K/UL (ref 0–0.2)
BASOPHILS NFR BLD: 0.5 % (ref 0–1.9)
BASOPHILS NFR BLD: 0.5 % (ref 0–1.9)
BILIRUB SERPL-MCNC: 1.1 MG/DL (ref 0.1–1)
BUN SERPL-MCNC: 59 MG/DL (ref 8–23)
BUN SERPL-MCNC: 59 MG/DL (ref 8–23)
CALCIUM SERPL-MCNC: 8.5 MG/DL (ref 8.7–10.5)
CALCIUM SERPL-MCNC: 8.5 MG/DL (ref 8.7–10.5)
CHLORIDE SERPL-SCNC: 99 MMOL/L (ref 95–110)
CHLORIDE SERPL-SCNC: 99 MMOL/L (ref 95–110)
CO2 SERPL-SCNC: 27 MMOL/L (ref 23–29)
CO2 SERPL-SCNC: 27 MMOL/L (ref 23–29)
CREAT SERPL-MCNC: 8.4 MG/DL (ref 0.5–1.4)
CREAT SERPL-MCNC: 8.4 MG/DL (ref 0.5–1.4)
DIFFERENTIAL METHOD: ABNORMAL
DIFFERENTIAL METHOD: ABNORMAL
EOSINOPHIL # BLD AUTO: 0.2 K/UL (ref 0–0.5)
EOSINOPHIL # BLD AUTO: 0.2 K/UL (ref 0–0.5)
EOSINOPHIL NFR BLD: 3.2 % (ref 0–8)
EOSINOPHIL NFR BLD: 3.2 % (ref 0–8)
ERYTHROCYTE [DISTWIDTH] IN BLOOD BY AUTOMATED COUNT: 23.9 % (ref 11.5–14.5)
ERYTHROCYTE [DISTWIDTH] IN BLOOD BY AUTOMATED COUNT: 23.9 % (ref 11.5–14.5)
EST. GFR  (AFRICAN AMERICAN): 4.9 ML/MIN/1.73 M^2
EST. GFR  (AFRICAN AMERICAN): 4.9 ML/MIN/1.73 M^2
EST. GFR  (NON AFRICAN AMERICAN): 4.3 ML/MIN/1.73 M^2
EST. GFR  (NON AFRICAN AMERICAN): 4.3 ML/MIN/1.73 M^2
GLUCOSE SERPL-MCNC: 115 MG/DL (ref 70–110)
GLUCOSE SERPL-MCNC: 115 MG/DL (ref 70–110)
GLUCOSE SERPL-MCNC: 81 MG/DL (ref 70–110)
GLUCOSE SERPL-MCNC: 82 MG/DL (ref 70–110)
HCT VFR BLD AUTO: 28.4 % (ref 37–48.5)
HCT VFR BLD AUTO: 28.4 % (ref 37–48.5)
HGB BLD-MCNC: 9.1 G/DL (ref 12–16)
HGB BLD-MCNC: 9.1 G/DL (ref 12–16)
IMM GRANULOCYTES # BLD AUTO: 0.06 K/UL (ref 0–0.04)
IMM GRANULOCYTES # BLD AUTO: 0.06 K/UL (ref 0–0.04)
IMM GRANULOCYTES NFR BLD AUTO: 1.1 % (ref 0–0.5)
IMM GRANULOCYTES NFR BLD AUTO: 1.1 % (ref 0–0.5)
LYMPHOCYTES # BLD AUTO: 0.5 K/UL (ref 1–4.8)
LYMPHOCYTES # BLD AUTO: 0.5 K/UL (ref 1–4.8)
LYMPHOCYTES NFR BLD: 8.8 % (ref 18–48)
LYMPHOCYTES NFR BLD: 8.8 % (ref 18–48)
MAGNESIUM SERPL-MCNC: 2.2 MG/DL (ref 1.6–2.6)
MCH RBC QN AUTO: 29 PG (ref 27–31)
MCH RBC QN AUTO: 29 PG (ref 27–31)
MCHC RBC AUTO-ENTMCNC: 32 G/DL (ref 32–36)
MCHC RBC AUTO-ENTMCNC: 32 G/DL (ref 32–36)
MCV RBC AUTO: 90 FL (ref 82–98)
MCV RBC AUTO: 90 FL (ref 82–98)
MONOCYTES # BLD AUTO: 0.4 K/UL (ref 0.3–1)
MONOCYTES # BLD AUTO: 0.4 K/UL (ref 0.3–1)
MONOCYTES NFR BLD: 6.1 % (ref 4–15)
MONOCYTES NFR BLD: 6.1 % (ref 4–15)
NEUTROPHILS # BLD AUTO: 4.6 K/UL (ref 1.8–7.7)
NEUTROPHILS # BLD AUTO: 4.6 K/UL (ref 1.8–7.7)
NEUTROPHILS NFR BLD: 80.3 % (ref 38–73)
NEUTROPHILS NFR BLD: 80.3 % (ref 38–73)
NRBC BLD-RTO: 3 /100 WBC
NRBC BLD-RTO: 3 /100 WBC
PHOSPHATE SERPL-MCNC: 4.1 MG/DL (ref 2.7–4.5)
PLATELET # BLD AUTO: 256 K/UL (ref 150–350)
PLATELET # BLD AUTO: 256 K/UL (ref 150–350)
PMV BLD AUTO: 10.1 FL (ref 9.2–12.9)
PMV BLD AUTO: 10.1 FL (ref 9.2–12.9)
POTASSIUM SERPL-SCNC: 3.7 MMOL/L (ref 3.5–5.1)
POTASSIUM SERPL-SCNC: 3.7 MMOL/L (ref 3.5–5.1)
PROT SERPL-MCNC: 7 G/DL (ref 6–8.4)
RBC # BLD AUTO: 3.14 M/UL (ref 4–5.4)
RBC # BLD AUTO: 3.14 M/UL (ref 4–5.4)
SODIUM SERPL-SCNC: 142 MMOL/L (ref 136–145)
SODIUM SERPL-SCNC: 142 MMOL/L (ref 136–145)
WBC # BLD AUTO: 5.71 K/UL (ref 3.9–12.7)
WBC # BLD AUTO: 5.71 K/UL (ref 3.9–12.7)

## 2019-09-09 PROCEDURE — 92610 EVALUATE SWALLOWING FUNCTION: CPT

## 2019-09-09 PROCEDURE — 25000003 PHARM REV CODE 250: Performed by: INTERNAL MEDICINE

## 2019-09-09 PROCEDURE — 84100 ASSAY OF PHOSPHORUS: CPT

## 2019-09-09 PROCEDURE — 27000221 HC OXYGEN, UP TO 24 HOURS

## 2019-09-09 PROCEDURE — 25000003 PHARM REV CODE 250: Performed by: NURSE PRACTITIONER

## 2019-09-09 PROCEDURE — 90935 HEMODIALYSIS ONE EVALUATION: CPT

## 2019-09-09 PROCEDURE — 63600175 PHARM REV CODE 636 W HCPCS: Performed by: INTERNAL MEDICINE

## 2019-09-09 PROCEDURE — 25000242 PHARM REV CODE 250 ALT 637 W/ HCPCS: Performed by: INTERNAL MEDICINE

## 2019-09-09 PROCEDURE — 80053 COMPREHEN METABOLIC PANEL: CPT

## 2019-09-09 PROCEDURE — 82962 GLUCOSE BLOOD TEST: CPT

## 2019-09-09 PROCEDURE — 94761 N-INVAS EAR/PLS OXIMETRY MLT: CPT

## 2019-09-09 PROCEDURE — 85025 COMPLETE CBC W/AUTO DIFF WBC: CPT

## 2019-09-09 PROCEDURE — 83735 ASSAY OF MAGNESIUM: CPT

## 2019-09-09 PROCEDURE — 21000000 HC CCU ICU ROOM CHARGE

## 2019-09-09 PROCEDURE — 94640 AIRWAY INHALATION TREATMENT: CPT

## 2019-09-09 RX ORDER — MUPIROCIN 20 MG/G
OINTMENT TOPICAL 2 TIMES DAILY
Status: DISCONTINUED | OUTPATIENT
Start: 2019-09-09 | End: 2019-09-11

## 2019-09-09 RX ORDER — METOPROLOL TARTRATE 1 MG/ML
INJECTION, SOLUTION INTRAVENOUS
Status: DISPENSED
Start: 2019-09-09 | End: 2019-09-09

## 2019-09-09 RX ORDER — SODIUM CHLORIDE 9 MG/ML
INJECTION, SOLUTION INTRAVENOUS ONCE
Status: DISCONTINUED | OUTPATIENT
Start: 2019-09-09 | End: 2019-09-11

## 2019-09-09 RX ORDER — SODIUM CHLORIDE 9 MG/ML
INJECTION, SOLUTION INTRAVENOUS
Status: DISCONTINUED | OUTPATIENT
Start: 2019-09-09 | End: 2019-09-12 | Stop reason: HOSPADM

## 2019-09-09 RX ORDER — METOPROLOL TARTRATE 1 MG/ML
2.5 INJECTION, SOLUTION INTRAVENOUS EVERY 8 HOURS
Status: DISCONTINUED | OUTPATIENT
Start: 2019-09-09 | End: 2019-09-11

## 2019-09-09 RX ADMIN — DILTIAZEM HYDROCHLORIDE 5 MG/HR: 5 INJECTION INTRAVENOUS at 12:09

## 2019-09-09 RX ADMIN — MORPHINE SULFATE 4 MG: 4 INJECTION INTRAVENOUS at 11:09

## 2019-09-09 RX ADMIN — DILTIAZEM HYDROCHLORIDE 5 MG/HR: 5 INJECTION INTRAVENOUS at 09:09

## 2019-09-09 RX ADMIN — ENOXAPARIN SODIUM 40 MG: 100 INJECTION SUBCUTANEOUS at 06:09

## 2019-09-09 RX ADMIN — DEXTROSE AND SODIUM CHLORIDE: 5; .9 INJECTION, SOLUTION INTRAVENOUS at 04:09

## 2019-09-09 RX ADMIN — METOPROLOL TARTRATE 2.5 MG: 1 INJECTION, SOLUTION INTRAVENOUS at 11:09

## 2019-09-09 RX ADMIN — Medication: at 06:09

## 2019-09-09 RX ADMIN — METOPROLOL TARTRATE 2.5 MG: 1 INJECTION, SOLUTION INTRAVENOUS at 10:09

## 2019-09-09 RX ADMIN — PIPERACILLIN AND TAZOBACTAM 3.38 G: 3; .375 INJECTION, POWDER, LYOPHILIZED, FOR SOLUTION INTRAVENOUS; PARENTERAL at 04:09

## 2019-09-09 RX ADMIN — BUDESONIDE 0.5 MG: 0.5 INHALANT RESPIRATORY (INHALATION) at 09:09

## 2019-09-09 RX ADMIN — PIPERACILLIN AND TAZOBACTAM 3.38 G: 3; .375 INJECTION, POWDER, LYOPHILIZED, FOR SOLUTION INTRAVENOUS; PARENTERAL at 06:09

## 2019-09-09 RX ADMIN — BUDESONIDE 0.5 MG: 0.5 INHALANT RESPIRATORY (INHALATION) at 08:09

## 2019-09-09 RX ADMIN — MORPHINE SULFATE 4 MG: 4 INJECTION INTRAVENOUS at 08:09

## 2019-09-09 RX ADMIN — ALBUTEROL SULFATE 2.5 MG: 2.5 SOLUTION RESPIRATORY (INHALATION) at 09:09

## 2019-09-09 NOTE — PT/OT/SLP EVAL
Speech Language Pathology Evaluation  Bedside Swallow    Patient Name:  Griselda Neely   MRN:  [id number]  Admitting Diagnosis: Severe sepsis    Recommendations:                 General Recommendations:  Ongoing assessment of swallow function  Diet recommendations:   , Thin   Aspiration Precautions: Assistance with meals   General Precautions: Standard, aspiration  Communication strategies:  none    History:     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    both legs    CHF (congestive heart failure)     Encounter for blood transfusion 03/2016    Gout     Hypertension     Mitral valve regurgitation     Osteoarthritis     Pancreatitis     Peripheral vascular disease     Peritoneal dialysis catheter in place     Pneumonia 09/09/2017    Renal failure        Past Surgical History:   Procedure Laterality Date    ACHILLES TENDON SURGERY Right     APPENDECTOMY      CARDIAC CATHETERIZATION  07/03/2017    CHOLECYSTECTOMY      heal surgery Right     HYSTERECTOMY      INSERTION-PERITONEAL DIALYSIS CATHETER-LAPAROSCOPIC N/A 4/26/2016    Performed by Leah Cortes MD at [facility] OR    PARATHYROIDECTOMY      PARATHYROIDECTOMY  07/13/2017    PARATHYROIDECTOMY N/A 7/13/2017    Performed by Jhoan Willoughby MD at [facility] OR    PERITONEAL CATHETER INSERTION      REIMPLANTATION-PARATHYROID TISSUE N/A 7/13/2017    Performed by J[facility]an Willoughby MD at [facility] OR    REMOVAL-CATHETER-DIALYSIS-PERITONEAL N/A 9/19/2017    Performed by Jhoan Willoughby MD at [facility] OR    RENAL BIOPSY      vocal cord nodule         Prior Intubation HX:  None this admit    Modified Barium Swallow: none within epic system    Imaging Results          CT Head Without Contrast (Final result)  Result time 09/07/19 18:02:02    Final result by Alonso Bain MD (09/07/19 18:02:02)                 Impression:      1. No acute intracranial process.    2. Chronic/involutional findings as noted  above.      Electronically signed by: Alonso Bain MD  Date:    09/07/2019  Time:    18:02             Narrative:    CLINICAL HISTORY:  ([id number])75 y/o  (1945) F    Confusion/delirium, altered LOC, unexplained;Weakness;    TECHNIQUE:  (JUDITH#[id number], exam time 9/7/2019 17:59)    CT HEAD WITHOUT CONTRAST [id number]    Axial CT of the brain without contrast using soft tissue and bone algorithm. Please note in the acute setting if there is a clinical concern for an acute stroke MRI would be more sensitive/specific for evaluation of ischemia.    CMS MANDATED QUALITY DATA - CT RADIATION - 436    All CT scans at this facility utilize dose modulation, iterative reconstruction, and/or weight based dosing when appropriate to reduce radiation dose to as low as reasonably achievable.    COMPARISON:  08/12/2019    FINDINGS:  No acute intracranial hemorrhage, edema or mass effect, and no acute parenchymal abnormality. There is no hydrocephalus, herniation or midline shift, and the basal and suprasellar cisterns are within normal limits. The osseous structures show no acute skull fracture. Mild diffuse cerebral and cerebellar atrophy for age with moderate periventricular deep cerebral white matter low attenuation  nonspecific findings which can be seen in any diffuse white matter process but most commonly associated with chronic microvascular ischemic disease. There are scattered atheromatous calcifications in the intracranial internal carotid arteries. Orbital contents appear within normal limits. External auditory canals are unremarkable. The visualized paranasal sinuses and mastoid air cells are essentially clear.                               X-Ray Chest AP Portable (Final result)  Result time 09/07/19 16:22:58    Final result by Alonso Bain MD (09/07/19 16:22:58)                 Impression:      Cardiomegaly and findings suggestive of mild pulmonary vascular congestion/trace edema with a trace right-sided pleural  "effusion and adjacent atelectasis.      Electronically signed by: Alonso Bain MD  Date:    09/07/2019  Time:    16:22             Narrative:    CLINICAL HISTORY:  ([id number])75 y/o  (1945) F    Fever;    TECHNIQUE:  (A#[id number], exam time 9/7/2019 16:21)    XR CHEST AP PORTABLE [id number]    COMPARISON:  None available.    FINDINGS:  Mildly increased central hilar interstitial opacities are seen bilaterally suggestive of either mild edema  atypical infection/pneumonia or bronchitis in the appropriate clinical setting. No consolidative pneumonia is seen. There is blunting of the right costophrenic angle consistent with trace pleural effusion. No pneumothorax is identified. The cardiac silhouette is moderately enlarged with an annular aortic valve replacement. Atheromatous calcifications are seen at the aortic arch. Osseous structures appear unchanged. The visualized upper abdomen is unremarkable.                                  Prior diet: unknown; Pt unable to provide hx     Subjective     "I hate applesauce"  Patient goals: none stated      Pain/Comfort:  · Pain Rating 1: 0/10    Objective:     HPI: 74 year old female with PMH of ESRD-DD, Atrial fibrillation,  who was brought in with complaints of tachycardia. Son is at bedside and provides most of the history as paitent  is altered. He states that at baseline she is able to get up from bed and move around the house. However since last night she has been confused and lethargic   He denies any history of fevers at home. The patient is drowsy but arousable. Responds to painful stimuli, but does not follow commands.  On arrival to the ER, she was febrile with a rectal temp of 101, tachycardic into the 150s, telemetry monitoring shows atrial fibrillation. She also has an elevated lactate level.       Oral Musculature Evaluation  · Oral Musculature: unable to assess due to poor participation/comprehension  · Dentition: edentulous  · Secretion Management: " adequate    Cognitive Communication Status: Pt alert. Intermittent fluent and appropriate responses appreciated. Oriented to self. Pt highly inattentive to clinician and evaluation procedures. Unable to follow simple commands. Assessment limited due to Pt's cognitive communication status.     Bedside Swallow Eval:   Consistencies Assessed:  · Thin liquids icechipx1, tspx2, self regulated cup x2 and self regulated straw x2   · Puree presented however, Pt with adamant refusal. Despite offerings of alternatives, redirections, and varied viscosities Pt perseverating on initial trial and continued to refuse.     Oral Phase:   · Adequate spoon stripping of icechip and tsp amounts of thin liquid   · Adequate labial seal to straw with extraction   · Adequate oral containment    Pharyngeal Phase:   · No overt s/s of aspiration or changes to vocal quality  · RR maintained however, high t/o at 33-42    Assessment:     Griselda Neely is a 74 y.o. female with an SLP diagnosis of Cognitive Based Dysphagia.  She presents with no overt s/s of airway compromise with limited thin liquid trials. Adamant refusal of puree with inability to redirect or provide alternative options due to Pt's perseveration. Liquids to be provided with 1:1 assist. SLP to follow 3x/week for ongoing assessment.     Goals:   Multidisciplinary Problems     SLP Goals        Problem: SLP Goal    Goal Priority Disciplines Outcome   SLP Goal     SLP Ongoing (interventions implemented as appropriate)   Description:  1. Tolerate 3oz of thin liquid w/out overt s/s of aspiration or changes to vocal quality  2. Accept puree trials via tsp x5 with adequate oral containment, timely AP transfer and no overt s/s of airway compromise                     Plan:     · Patient to be seen:  3 x/week   · Plan of Care expires:  09/16/19  · Plan of Care reviewed with:  patient   · SLP Follow-Up:  Yes      Time Tracking:     SLP Treatment Date:   09/09/19  Speech Start Time:   0825  Speech Stop Time:  0836     Speech Total Time (min):  11 min    Billable Minutes: Eval Swallow and Oral Function 11    Codey Glover CCC-SLP  09/09/2019

## 2019-09-09 NOTE — SIGNIFICANT EVENT
Pt's son Jovany, her only child, is present at bedside, expressing his desire to make pt DNR to pt's nurse, Carmel. Came to discuss with him and he confirmed the same to me.   Patient is currently disoriented and unable to make decisions for herself.   Will update code status in the chart to reflect this.

## 2019-09-09 NOTE — NURSING
HD complete  Tx tolerated fair, vss  Net UF 1300mL  Report given to Edna  Pt awaiting transport back to room via bed

## 2019-09-09 NOTE — PLAN OF CARE
Problem: SLP Goal  Goal: SLP Goal  1. Tolerate 3oz of thin liquid w/out overt s/s of aspiration or changes to vocal quality  2. Accept puree trials via tsp x5 with adequate oral containment, timely AP transfer and no overt s/s of airway compromise   Outcome: Ongoing (interventions implemented as appropriate)  POC initiated; recommend initiation of liquid diet with 1:1 assist and ongoing assessment of swallow function

## 2019-09-09 NOTE — PROGRESS NOTES
[facility]  Nephrology  Progress Note    Patient Name: Griselda Neely  MRN: [id number]  Admission Date: 9/7/2019  Hospital Length of Stay: 2 days  Attending Provider: Amos Snyder MD   Primary Care Physician: Kalie Neely MD  Principal Problem:Severe sepsis      Subjective:     Interval History: feels 'ok' this am; denies any specific complaints    Review of patient's allergies indicates:  No Known Allergies  Current Facility-Administered Medications   Medication Frequency    acetaminophen tablet 650 mg Q4H PRN    acetaminophen tablet 650 mg Q8H PRN    albuterol nebulizer solution 2.5 mg Q4H PRN    aspirin chewable tablet 81 mg Daily    budesonide nebulizer solution 0.5 mg BID    calcitRIOL capsule 0.25 mcg Daily    dextrose 5 % and 0.9 % NaCl infusion Continuous    dextrose 50% injection 12.5 g PRN    dextrose 50% injection 25 g PRN    diltiaZEM 125 mg in D5W 125 mL infusion Continuous    diltiaZEM 24 hr capsule 180 mg Daily    diltiaZEM tablet 60 mg Q8H    divalproex ER 24 hr tablet 750 mg Daily    enoxaparin injection 40 mg Daily    glucagon (human recombinant) injection 1 mg PRN    glucose chewable tablet 16 g PRN    glucose chewable tablet 24 g PRN    HYDROcodone-acetaminophen  mg per tablet 1 tablet Q6H PRN    HYDROcodone-acetaminophen 5-325 mg per tablet 1 tablet Q6H PRN    insulin aspart U-100 pen 1-10 Units Q6H PRN    isosorbide mononitrate 24 hr tablet 30 mg Daily    lacosamide tablet 50 mg Daily    levETIRAcetam tablet 750 mg Daily    losartan tablet 25 mg Daily    magnesium oxide tablet 400 mg Daily    metoprolol succinate (TOPROL-XL) 24 hr tablet 50 mg Daily    morphine injection 4 mg Q4H PRN    ondansetron injection 4 mg Q8H PRN    oxyCODONE-acetaminophen 5-325 mg per tablet 1 tablet Q4H PRN    piperacillin-tazobactam 3.375 g in dextrose 5 % 50 mL IVPB (ready to mix system) Q12H    promethazine (PHENERGAN) 6.25 mg in dextrose 5 % 50 mL IVPB  Q6H PRN    senna-docusate 8.6-50 mg per tablet 1 tablet BID    sodium chloride 0.9% flush 2 mL PRN    traMADol tablet 50 mg Q6H PRN       Objective:     Vital Signs (Most Recent):  Temp: 98 °F (36.7 °C) (09/09/19 0716)  Pulse: 99 (09/09/19 0806)  Resp: 20 (09/09/19 0806)  BP: (!) 163/88 (09/09/19 0716)  SpO2: (!) 91 % (09/09/19 0806)  O2 Device (Oxygen Therapy): room air (09/09/19 0806) Vital Signs (24h Range):  Temp:  [98 °F (36.7 °C)-99.2 °F (37.3 °C)] 98 °F (36.7 °C)  Pulse:  [] 99  Resp:  [15-42] 20  SpO2:  [88 %-98 %] 91 %  BP: ()/(68-97) 163/88     Weight: 58 kg (127 lb 13.9 oz) (09/07/19 2300)  Body mass index is 21.28 kg/m².  Body surface area is 1.63 meters squared.    I/O last 3 completed shifts:  In: 841.4 [I.V.:741.4; IV Piggyback:100]  Out: 0     Physical Exam   Constitutional: No distress.   Frail, chronically ill-appearing   HENT:   Head: Normocephalic and atraumatic.   B temporal wasting   Eyes: Right eye exhibits no discharge. Left eye exhibits no discharge. No scleral icterus.   B conjunctival injection   Neck: Normal range of motion. Neck supple. No JVD present.   Cardiovascular: Normal rate and intact distal pulses. Exam reveals no gallop and no friction rub.   Murmur heard.  Irregularly irregular rhythm   Pulmonary/Chest: Effort normal and breath sounds normal. No respiratory distress. She has no rales.   Abdominal: Soft. She exhibits no distension and no mass. There is no tenderness.   Hypoactive BS in all quadrants   Genitourinary:   Genitourinary Comments: Deferred   Musculoskeletal: Normal range of motion. She exhibits no edema, tenderness or deformity.   R UE AV Fistula w/ good thrill   Neurological: She is alert. No cranial nerve deficit.   Skin: Skin is warm and dry. She is not diaphoretic.   Psychiatric: Her behavior is normal.   Flat affect   Nursing note and vitals reviewed.      Significant Labs:sure  CBC:   Recent Labs   Lab 09/09/19  0527   WBC 5.71  5.71   RBC 3.14*   3.14*   HGB 9.1*  9.1*   HCT 28.4*  28.4*     256   MCV 90  90   MCH 29.0  29.0   MCHC 32.0  32.0     CMP:   Recent Labs   Lab 09/09/19  0527   *  115*   CALCIUM 8.5*  8.5*   ALBUMIN 2.7*   PROT 7.0     142   K 3.7  3.7   CO2 27  27   CL 99  99   BUN 59*  59*   CREATININE 8.4*  8.4*   ALKPHOS 89   ALT 52*   AST 82*   BILITOT 1.1*       Significant Imaging:  X-Ray: Reviewed    Assessment/Plan:     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 [E83.59, L97.922] 07/20/2017 Yes    Atrial fibrillation with RVR [I48.91] 07/18/2017 Unknown    ESRD (end stage renal disease) [N18.6] 05/11/2016 Yes      Problems Resolved During this Admission:     1 ESRD (HD-MWF via R UE AV Fistula)- clinically stable at present; no overt volume overload, uremic signs/symptoms, electrolyte perturbations nor acid-base disturbance; no active issues    -maintenance HD (duration: 3h; UF Goal: 2-3L as tolerated;  STANDARD 'bath'; Access: AVF; Qb: 400 ml/min, Qd: 2x BFR; 12.5 gm of Na thiosulfate over last 30-60 min of treatment; 12.5-25 gm 25% Albumin w/ HD for BP support) per outpatient schedule    -DAILY renal function panel to document sCr trend, optimize electrolyte 'bath' & assess response to therapy    -avoid nephrotoxic agents (NSAIDs, IV contrast dye, MRI w/ Gadolinium constrast)    -renally dose all appropriate medications, including antibiotics     2. HTN- clinically stable at present; BP AT GOAL (152/71) on current anti-HTN drig regimen + HD-associated UF;  active issues    -continue present management as documented above (GIVE BP MEDS POST HD on HD Days) +/- HD-associated UF     3. ANEMIA- clinically stable at present; H/H AT GOAL (9.1/28.4); no active issues    -trend DAILY CBC  (H/H) to effect optimal blood-loss surveillance     4. SECONDARY HYPERPARATHYROIDISM (sHPT)-- clinically stable on current therapy (Na thiosulfate 12.5 gm x last 30-60 min of HD); no active issues    -continue dietary binder/Vitamin D +/- HD-associated calcimimetic therapy (Cinacalcet vs Etelcalcetide)     5. ALTERED MENTAL STATUS (now 'DNR' designate)- remains acutely confused & markedly restless this am; etiology likely multifactorial; may benefit from formal Neurology service consultation for additional therapeutic input    -management per Primary team recommendations       Thank you for your consult. I will follow-up with patient. Please contact us if you have any additional questions.    Dane Martinez III, MD  [facility]  [facility]  [phone] (C)

## 2019-09-09 NOTE — PROGRESS NOTES
74 yr old female   Family at bedside to assist     09/09/19 1030        Wound 09/09/19 1021 Other (comment) medial Thigh #1   Date First Assessed/Time First Assessed: 09/09/19 1021   Pre-existing: Yes  Primary Wound Type: (c) Other (comment)  Side: Left  Orientation: medial  Location: Thigh  Wound/PI Number (optional): #1   Wound Image    Dressing Appearance Intact;Moist drainage   Drainage Amount Small   Drainage Characteristics/Odor Tan;Yellow   Appearance Pink;Red;Slough;Yellow   Tissue loss description Partial thickness   Periwound Area Dry   Wound Edges Irregular   Wound Length (cm) 5 cm   Wound Width (cm) 5 cm   Wound Depth (cm) 1 cm   Wound Volume (cm^3) 25 cm^3   Wound Surface Area (cm^2) 25 cm^2   Care Cleansed with:;Antimicrobial agent   Dressing Applied;Calcium alginate;Island/border     Recommendation: lt upper thigh   clean with chlohrexidine/ns  pat dry  cover open wound with aquacel ag and mepilex every three days      09/09/19 1035        Wound 09/09/19 1024 Incontinence associated dermatitis Buttocks #2   Date First Assessed/Time First Assessed: 09/09/19 1024   Pre-existing: Yes  Primary Wound Type: Incontinence associated dermatitis  Location: Buttocks  Wound/PI Number (optional): #2   Dressing Appearance Open to air   Drainage Amount Small   Drainage Characteristics/Odor Serosanguineous   Appearance Red   Periwound Area Denuded   Care Cleansed with:;Antimicrobial agent;Applied:  (triad and place on airflow pad )     Recommendation: lower buttock Clean area with chlorhexidine/ns. Pat dry. Apply Triad and place on an air flow pad daily      09/09/19 1038        Wound 09/09/19 1028 Non pressure chronic ulcer Calf   Date First Assessed/Time First Assessed: 09/09/19 1028   Pre-existing: Yes  Primary Wound Type: (c) Non pressure chronic ulcer  Side: (c)   Location: (c) Calf   Wound Image    Dressing Appearance Open to air;Dry   Appearance Other (see comments)  (scars from previous ulcers)   Periwound  Area Scar tissue   Care Cleansed with:;Antimicrobial agent;Applied:;Moisturizing agent     Recommendation: Bilat lower leg   Clean with chlorhexidine/ns. Pat dry. Apply Hydrophor daily.

## 2019-09-09 NOTE — PROGRESS NOTES
[facility] Medicine  Progress Note    Patient Name: Griselda Neely  MRN: [id number]  Patient Class: IP- Inpatient   Admission Date: 9/7/2019  Length of Stay: 2 days  Attending Physician: Amos Snyder MD  Primary Care Provider: Kalie Neely MD        Subjective:     Principal Problem:Severe sepsis    Interval History:  Seen examined, patient little bit more awake today.  This patient is known to me from a prior admission, apparently for the last few months she has recurrent episodes of acute confusion, without a clear etiology.  On this admission sepsis is suspected, will follow blood cultures for 24 hr.      However on her prior admission she had an extensive workup including Neurology, with EEG showed diffuse encephalopathy, also followed by Cardiology, and by Nephrology, as well as Infectious Disease.    Today I discussed with the son, present at bedside, that considering her recurrent hospitalizations, and poor prognosis overall and that she will qualify for hospice.  A hospice consult has been placed for the .  Patient's son voiced understanding and agreement.    Review of Systems  Objective:     Vital Signs (Most Recent):  Temp: 97.5 °F (36.4 °C) (09/09/19 1430)  Pulse: 75 (09/09/19 1730)  Resp: 18 (09/09/19 1430)  BP: 133/71 (09/09/19 1730)  SpO2: (!) 93 % (09/09/19 1505) Vital Signs (24h Range):  Temp:  [97.5 °F (36.4 °C)-99.2 °F (37.3 °C)] 97.5 °F (36.4 °C)  Pulse:  [] 75  Resp:  [15-42] 18  SpO2:  [88 %-98 %] 93 %  BP: ()/(68-97) 133/71     Weight: 58 kg (127 lb 13.9 oz)  Body mass index is 21.28 kg/m².    Intake/Output Summary (Last 24 hours) at 9/9/2019 1752  Last data filed at 9/9/2019 0954  Gross per 24 hour   Intake 590 ml   Output 1 ml   Net 589 ml      Physical Exam  General, patient less combative, and less confused compared to yesterday  Lungs, clear to auscultation bilaterally  Heart, regular rhythm and rate  Abdomen, soft nontender  nondistended  Extremities, decreased muscular months, also right posterior leg wound, calciphylaxis      Significant Labs:   BMP:   Recent Labs   Lab 09/09/19  0527   *  115*     142   K 3.7  3.7   CL 99  99   CO2 27  27   BUN 59*  59*   CREATININE 8.4*  8.4*   CALCIUM 8.5*  8.5*   MG 2.2     CBC:   Recent Labs   Lab 09/08/19  0342 09/09/19  0527   WBC 7.33 5.71  5.71   HGB 8.8* 9.1*  9.1*   HCT 28.3* 28.4*  28.4*    256  256         Assessment/Plan:      Active Diagnoses:    Diagnosis Date Noted POA    PRINCIPAL PROBLEM:  Severe sepsis [A41.9, R65.20] 09/07/2019 Yes    ESRD (end stage renal disease) [N18.6] 05/11/2016 Yes    Acute metabolic encephalopathy [G93.41] 09/07/2019 Unknown    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 [E83.59, L97.922] 07/20/2017 Yes    Atrial fibrillation with RVR [I48.91] 07/18/2017 Unknown      Problems Resolved During this Admission:     VTE Risk Mitigation (From admission, onward)        Ordered     enoxaparin injection 40 mg  Daily      09/07/19 1943     IP VTE HIGH RISK PATIENT  Once      09/07/19 1943          Suspected sever sepsis on admission, however clear source for infection  Continue Zosyn, continue vancomycin, so far blood cultures are negative  Infectious disease consult in place, appreciate input from Infectious Disease    Acute metabolic encephalopathy,  This seems to be improving  Etiology is unclear, likely multifactorial, likely related to progressive dementia, also end-stage renal disease on dialysis  Possible infection also to be considered as per 1.  This acute metabolic encephalopathy is recurrent for this patient, and seems to be increasing frequency at this point likely she is clinically declining.  Hospice discussed with the patient's son, who is also a healthcare proxy, he definitely agrees with the hospice evaluation.  Consult placed for a , to consult hospice.  I  "explained to the patient's son, that hospice would basically mean in the future mostly comfort care, and if this should recur in the future, an appropriate course might be just to "let nature take its course".  He voiced understanding    AFib with RVR  Continue Cardizem drip, improved, cardiology consult place, Dr. Powell is familiar with the patient, case discussed briefly with Dr. dukes, Dr. Reyes likely follow-up on patient.  Appreciate input in the case      Calciphylaxis, the lower extremities, with nonhealing ulcer, on the right leg   Does not appear grossly infected,     ESRD, hemodialysis  Continue hemodialysis schedule as per Nephrology, appreciate input from Nephrology    Oral thrush, dose of Diflucan given IV yesterday,   also Infectious Disease consult placed, appreciate input as to from Infectious Disease    Amos Snyder MD  Department of Hospital Medicine   [facility]  "

## 2019-09-09 NOTE — PLAN OF CARE
Problem: Adult Inpatient Plan of Care  Goal: Plan of Care Review  Outcome: Ongoing (interventions implemented as appropriate)     09/09/19 1528   Plan of Care Review   Plan of Care Reviewed With patient   Progress no change   Outcome Summary pt confused on cardizem drip

## 2019-09-09 NOTE — CONSULTS
"  [facility]  Adult Nutrition  Consult Note    SUMMARY     Recommendations    1. Advance diet when medically appropriate     2. If pt continues to fail swallow study and remains NPO for the next 24-48 hours, recommend starting TF. If TF desired, consult RD for recommendations    Goals: Pt will advance to a diet and meet >50% of estimated needs via PO intake of meals  Nutrition Goal Status: new    Reason for Assessment    Reason For Assessment: consult  Diagnosis: infection/sepsis  Relevant Medical History: CHF, HTN, renal failuer, pancreatitis   Interdisciplinary Rounds: attended    Nutrition Risk Screen    Nutrition Risk Screen: dysphagia or difficulty swallowing, large or nonhealing wound, burn or pressure injury    Nutrition/Diet History    Patient Reported Diet/Restrictions/Preferences: general  Spiritual, Cultural Beliefs, Spiritism Practices, Values that Affect Care: no  Food Allergies: NKFA  Factors Affecting Nutritional Intake: impaired cognitive status/motor control, difficulty/impaired swallowing, NPO    Anthropometrics    Temp: 98 °F (36.7 °C)  Height: 5' 5" (165.1 cm)  Height (inches): 65 in  Weight Method: Bed Scale  Weight: 58 kg (127 lb 13.9 oz)  Weight (lb): 127.87 lb  Ideal Body Weight (IBW), Female: 125 lb  % Ideal Body Weight, Female (lb): 102.3 lb  BMI (Calculated): 21.3  BMI Grade: 18.5-24.9 - normal       Lab/Procedures/Meds    Pertinent Labs Reviewed: reviewed   9/9/2019 05:27   Potassium 3.7   Chloride 99   CO2 27   Anion Gap 16   BUN, Bld 59 (H)   Creatinine 8.4 (H)   eGFR if African American 4.9 (A)   Glucose 115 (H)   Calcium 8.5 (L)     Phosphorus 4.1   Magnesium 2.2   Alkaline Phosphatase 89   PROTEIN TOTAL 7.0   Albumin 2.7 (L)   BILIRUBIN TOTAL 1.1 (H)   AST 82 (H)   ALT 52 (H)     WBC 5.71   RBC 3.14 (L)   Hemoglobin 9.1 (L)   Hematocrit 28.4 (L)     Pertinent Medications Reviewed: reviewed  Scheduled Meds:   aspirin  81 mg Oral Daily    budesonide  0.5 mg " Nebulization BID    calcitRIOL  0.25 mcg Oral Daily    diltiaZEM  180 mg Oral Daily    diltiaZEM  60 mg Oral Q8H    divalproex ER  750 mg Oral Daily    enoxaparin  40 mg Subcutaneous Daily    isosorbide mononitrate  30 mg Oral Daily    lacosamide  50 mg Oral Daily    levETIRAcetam  750 mg Oral Daily    losartan  25 mg Oral Daily    magnesium oxide  400 mg Oral Daily    metoprolol succinate  50 mg Oral Daily    piperacillin-tazobactam (ZOSYN) IVPB  3.375 g Intravenous Q12H    senna-docusate 8.6-50 mg  1 tablet Oral BID     Continuous Infusions:   dextrose 5 % and 0.9 % NaCl 75 mL/hr at 09/09/19 0435    dilTIAZem 5 mg/hr (09/09/19 0046)     Estimated/Assessed Needs    Weight Used For Calorie Calculations: 57.6 kg (127 lb)  Energy Calorie Requirements (kcal): 3583-6191 kcal/day  Energy Need Method: Kcal/kg  Protein Requirements: 69-98 g/protein   Weight Used For Protein Calculations: 57.6 kg (127 lb)     Estimated Fluid Requirement Method: RDA Method  RDA Method (mL): 1440     Nutrition Prescription Ordered    Current Diet Order: NPO    Evaluation of Received Nutrient/Fluid Intake    Energy Calories Required: not meeting needs  Protein Required: not meeting needs  Fluid Required: not meeting needs  Tolerance: other (see comments)(NPO)  % Intake of Estimated Energy Needs: Other: NPO  % Meal Intake: NPO    Nutrition Risk    Level of Risk/Frequency of Follow-up: high     Assessment and Plan    Mrs. Neely was readmitted to the hospital on 9/7 due to possible sepsis. She remains non-verbal and confused. Mumbles in pain and when trying to answer questions. Son at bed-side and nurse are the best source for information. Son explained that his mother ate 100% of meals on Friday and only breakfast on Saturday. Failed swallow study. If patient continues to fail swallow study and remains NPO for 24-48 hrs. Recommend starting TF. Mrs. Neely has a hx of not eating much in the hospital. Previous hospital stays  have tried appetite stimulants, liberalizing diet, nutritional supplements and shakes, however none would be sufficient. Will continue to monitor NPO status and labs.    Monitor and Evaluation    Food and Nutrient Adminstration: diet order  Physical Activity and Function: nutrition-related ADLs and IADLs  Anthropometric Measurements: weight, weight change  Biochemical Data, Medical Tests and Procedures: electrolyte and renal panel, lipid profile, gastrointestinal profile, glucose/endocrine profile, inflammatory profile  Nutrition-Focused Physical Findings: overall appearance     Nutrition Follow-Up    RD Follow-up?: Yes   Chelo Asif  09/09/2019  9:41 AM

## 2019-09-09 NOTE — CONSULTS
Consult Note  Infectious Disease    Reason for Consult:  ?  Sepsis    HPI: Griselda Neely is a   74 y.o. female With a history of end-stage renal disease, referred to the emergency room on 09/07 because of tachycardia, confusion.  Her son states that starting on Saturday 9/7 she began to speak in a garbled way and he reports that she has been talking to dead relatives for about a week.  She was in the hospital from 8/06- 8/19 for altered mental status and pneumonia treated with antibiotics, Rocephin and vancomycin, initially IV and then oral Cefdinir and was started on anti epileptic medications(EEG with diffuse slowing)..  She was found to be febrile to 101°, in atrial fibrillation with rapid ventricular response, with lactic acidosis.  She was admitted to the Hospital Medicine service on sepsis protocol, broad-spectrum antibiotics with vancomycin and Zosyn with no focus of infection   readily apparent.  A chest x-ray shows vascular engorgement and cardiomegaly but I personally cannot exclude that there is not a right lower lobe infiltrate.  CT head with only atrophy and no acute changes  White blood cells were normal.  She was also found to have oral candidiasis, and chronic wounds of calciphylaxis.  She has had no further temperature elevations, is DNR officially, has mild elevation of procalcitonin (end-stage renal disease can make this elevated at baseline), lactic acid has returned to normal, blood cultures are negative. She was in dialysis at the time of rounds yesterday. She remains afebrile and blood cultures are still negative, with normal WBC. Son denies coughing, aspiration events, and she does continue to smoke. He is interested in hospice but does not want to stop dialysis. At home she is ambulatory, eating well until 9/7, but incontinent of stool.     Review of patient's allergies indicates:  No Known Allergies  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    both  legs    CHF (congestive heart failure)     Encounter for blood transfusion 03/2016    Gout     Hypertension     Mitral valve regurgitation     Osteoarthritis     Pancreatitis     Peripheral vascular disease     Peritoneal dialysis catheter in place     Pneumonia 09/09/2017    Renal failure     History of C difficile colitis    Past Surgical History:   Procedure Laterality Date    ACHILLES TENDON SURGERY Right     APPENDECTOMY      CARDIAC CATHETERIZATION  07/03/2017    CHOLECYSTECTOMY      heal surgery Right     HYSTERECTOMY      INSERTION-PERITONEAL DIALYSIS CATHETER-LAPAROSCOPIC N/A 4/26/2016    Performed by [facility]es [facility] at [facility]    PARATHYROIDECTOMY      PARATHYROIDECTOMY  07/13/2017    PARATHYROIDECTOMY N/A 7/13/2017    Performed by [facility]hoan Willoughby [facility] at [facility] OR    PERITONEAL CATHETER INSERTION      REIMPLANTATION-PARATHYROID TISSUE N/A 7/13/2017    Per[facility] at [facility]    REMOVAL-CATHETER-DIALYSIS-PERITONEAL N/A 9/19/2017    Performed by [facility]oughby [facility] at [facility] OR    RENAL BIOPSY      vocal cord nodule       Social History     Socioeconomic History    Marital status:      Spouse name: Not on file    Number of children: Not on file    Years of education: Not on file    Highest education level: Not on file   Occupational History    Not on file   Social Needs    Financial resource strain: Not on file    Food insecurity:     Worry: Not on file     Inability: Not on file    Transportation needs:     Medical: Not on file     Non-medical: Not on file   Tobacco Use    Smoking status: Current Some Day Smoker     Packs/day: 0.50     Years: 45.00     Pack years: 22.50    Smokeless tobacco: Never Used   Substance and Sexual Activity    Alcohol use: No    Drug use: No    Sexual activity: Not on file   Lifestyle    Physical activity:     Days per week: Not on file     Minutes per session: Not on file    Stress: Not on file    Relationships    Social connections:     Talks on phone: Not on file     Gets together: Not on file     Attends Zoroastrianism service: Not on file     Active member of club or organization: Not on file     Attends meetings of clubs or organizations: Not on file     Relationship status: Not on file   Other Topics Concern    Not on file   Social History Narrative    Not on file     Family History   Problem Relation Age of Onset    Heart disease Sister     Early death Sister         heart as baby    Heart disease Maternal Grandfather     Diabetes Mother     Hypertension Mother     Heart failure Mother     Heart disease Mother     Arthritis Mother     Diabetes Father     Early death Sister         infant       Pertinent medications noted:     Review of Systems: limited due to cognitive status  Chills, and fever at admission  No sinus congestion, purulent nasal discharge, post nasal drip or facial pain  No pain in mouth or throat. No problems with teeth, gums.  No chest pain,   minimal cough, no sputum production, shortness of breath,   No nausea, vomiting, diarrhea,  or focal abd pain,  No dysphagia, odynophagia or recognized aspiration events  anuric  No swelling of joints, redness of joints, injuries, or new focal pain  No unusual headaches, but has had several falls  She does not sleep well and son reports she constantly walks throughout the house  No bleeding, lymphadenopathy,   unusual bruising  Has had wounds of calciphylaxis x 3 months, per son  No recent/prior steroids  Implants: none  Antibiotic History: see above    EXAM & DIAGNOSTICS REVIEWED:   Vitals:     Temp:  [97.7 °F (36.5 °C)-99.2 °F (37.3 °C)]   Temp: 97.7 °F (36.5 °C) (09/09/19 1121)  Pulse: 89 (09/09/19 1121)  Resp: (!) 21 (09/09/19 1121)  BP: (!) 152/71 (09/09/19 1121)  SpO2: (!) 91 % (09/09/19 0806)    Intake/Output Summary (Last 24 hours) at 9/9/2019 1420  Last data filed at 9/8/2019 1800  Gross per 24 hour   Intake 640 ml   Output 0  ml   Net 640 ml       General:  In NAD.arousable and attentive, cooperative,    Eyes:  Anicteric, PERRL, EOMI  ENT:  No ulcers, exudates, thrush, nares patent,    Neck:  supple, no masses or adenopathy appreciated  Lungs: RLL dense crackles  Heart:  iRRR, no gallop/murmur/rub noted  Abd:  Soft, NT, ND, normal BS, no masses or organomegaly appreciated.  :  anuric  Musc:  Joints without effusion, swelling, erythema, synovitis, muscle wasting.   Skin:  No rashes. No palmar or plantar lesions. No subungual petechiae  Wound:    consistent with  uninfected calciphylaxis. Very tender as usual. Very near her incontinent perineum    Neuro: arousable, attentive, speech intermittently clear, s ometimes garbled,  face symmetric, moves all extremities, no focal weakness.    Psych:  Calm, cooperative  Lymphatic:     No cervical, supraclavicular, axillary, or inguinal nodes  Extrem: Left arm edema, no erythema, phlebitis, cellulitis, warm and well perfused  VAD:  Peripheral. Right Arm AVF has no signs of infection  Isolation:  none    Lines/Tubes/Drains:    General Labs reviewed:  Recent Labs   Lab 09/07/19  1600 09/07/19  1606 09/08/19  0342 09/09/19  0527   WBC 9.23  --  7.33 5.71  5.71   HGB 9.2*  --  8.8* 9.1*  9.1*   HCT 29.4* 31* 28.3* 28.4*  28.4*     --  269 256  256       Recent Labs   Lab 09/07/19  1600 09/08/19  0342 09/09/19  0527   NA  --  139 142  142   K  --  4.3 3.7  3.7   CL  --  95 99  99   CO2  --  29 27  27   BUN  --  54* 59*  59*   CREATININE  --  7.8* 8.4*  8.4*   CALCIUM  --  8.3* 8.5*  8.5*   PROT 7.5 7.1 7.0   BILITOT 1.3* 1.2* 1.1*   ALKPHOS 101 102 89   ALT 18 30 52*   AST 55* 72* 82*           Micro:  Microbiology Results (last 7 days)     Procedure Component Value Units Date/Time    Blood culture [[id number]] Collected:  09/07/19 1615    Order Status:  Completed Specimen:  Blood from Peripheral, Hand, Left Updated:  09/08/19 1632     Blood Culture, Routine No Growth to date      No  Growth to date    Blood culture [[id number]] Collected:  09/07/19 1600    Order Status:  Completed Specimen:  Blood from Peripheral, Wrist, Left Updated:  09/08/19 1632     Blood Culture, Routine No Growth to date      No Growth to date    Blood culture [[id number]]     Order Status:  Canceled Specimen:  Blood     Blood culture [[id number]]     Order Status:  Canceled Specimen:  Blood         Imaging Reviewed:   CXR   CT head  Cardiology:    IMPRESSION & PLAN   1.  Right lower lob pneumonia  2. At risk for aspiration due to altered mental status  3 Uninfected left leg calciphylaxis, at risk for contamination  4. ESRD, on dialysis  5. History of C. Difficile colitis      Recommendations:  Stop vanc, repeat CXR, may de-escalate to oral if she goes on hospice  probiotic

## 2019-09-10 LAB
ALBUMIN SERPL BCP-MCNC: 2.4 G/DL (ref 3.5–5.2)
ALP SERPL-CCNC: 87 U/L (ref 55–135)
ALT SERPL W/O P-5'-P-CCNC: 48 U/L (ref 10–44)
ANION GAP SERPL CALC-SCNC: 12 MMOL/L (ref 8–16)
ANION GAP SERPL CALC-SCNC: 12 MMOL/L (ref 8–16)
AST SERPL-CCNC: 56 U/L (ref 10–40)
BASOPHILS # BLD AUTO: 0.03 K/UL (ref 0–0.2)
BASOPHILS # BLD AUTO: 0.03 K/UL (ref 0–0.2)
BASOPHILS NFR BLD: 0.7 % (ref 0–1.9)
BASOPHILS NFR BLD: 0.7 % (ref 0–1.9)
BILIRUB SERPL-MCNC: 1 MG/DL (ref 0.1–1)
BUN SERPL-MCNC: 28 MG/DL (ref 8–23)
BUN SERPL-MCNC: 28 MG/DL (ref 8–23)
CALCIUM SERPL-MCNC: 7.9 MG/DL (ref 8.7–10.5)
CALCIUM SERPL-MCNC: 7.9 MG/DL (ref 8.7–10.5)
CHLORIDE SERPL-SCNC: 100 MMOL/L (ref 95–110)
CHLORIDE SERPL-SCNC: 100 MMOL/L (ref 95–110)
CO2 SERPL-SCNC: 24 MMOL/L (ref 23–29)
CO2 SERPL-SCNC: 24 MMOL/L (ref 23–29)
CREAT SERPL-MCNC: 5.6 MG/DL (ref 0.5–1.4)
CREAT SERPL-MCNC: 5.6 MG/DL (ref 0.5–1.4)
DIFFERENTIAL METHOD: ABNORMAL
DIFFERENTIAL METHOD: ABNORMAL
EOSINOPHIL # BLD AUTO: 0.2 K/UL (ref 0–0.5)
EOSINOPHIL # BLD AUTO: 0.2 K/UL (ref 0–0.5)
EOSINOPHIL NFR BLD: 5 % (ref 0–8)
EOSINOPHIL NFR BLD: 5 % (ref 0–8)
ERYTHROCYTE [DISTWIDTH] IN BLOOD BY AUTOMATED COUNT: 23.2 % (ref 11.5–14.5)
ERYTHROCYTE [DISTWIDTH] IN BLOOD BY AUTOMATED COUNT: 23.2 % (ref 11.5–14.5)
EST. GFR  (AFRICAN AMERICAN): 8 ML/MIN/1.73 M^2
EST. GFR  (AFRICAN AMERICAN): 8 ML/MIN/1.73 M^2
EST. GFR  (NON AFRICAN AMERICAN): 7 ML/MIN/1.73 M^2
EST. GFR  (NON AFRICAN AMERICAN): 7 ML/MIN/1.73 M^2
GLUCOSE SERPL-MCNC: 68 MG/DL (ref 70–110)
GLUCOSE SERPL-MCNC: 73 MG/DL (ref 70–110)
GLUCOSE SERPL-MCNC: 76 MG/DL (ref 70–110)
GLUCOSE SERPL-MCNC: 78 MG/DL (ref 70–110)
HCT VFR BLD AUTO: 27.1 % (ref 37–48.5)
HCT VFR BLD AUTO: 27.1 % (ref 37–48.5)
HGB BLD-MCNC: 8.4 G/DL (ref 12–16)
HGB BLD-MCNC: 8.4 G/DL (ref 12–16)
IMM GRANULOCYTES # BLD AUTO: 0.03 K/UL (ref 0–0.04)
IMM GRANULOCYTES # BLD AUTO: 0.03 K/UL (ref 0–0.04)
IMM GRANULOCYTES NFR BLD AUTO: 0.7 % (ref 0–0.5)
IMM GRANULOCYTES NFR BLD AUTO: 0.7 % (ref 0–0.5)
LYMPHOCYTES # BLD AUTO: 0.6 K/UL (ref 1–4.8)
LYMPHOCYTES # BLD AUTO: 0.6 K/UL (ref 1–4.8)
LYMPHOCYTES NFR BLD: 13.1 % (ref 18–48)
LYMPHOCYTES NFR BLD: 13.1 % (ref 18–48)
MAGNESIUM SERPL-MCNC: 1.9 MG/DL (ref 1.6–2.6)
MCH RBC QN AUTO: 28.6 PG (ref 27–31)
MCH RBC QN AUTO: 28.6 PG (ref 27–31)
MCHC RBC AUTO-ENTMCNC: 31 G/DL (ref 32–36)
MCHC RBC AUTO-ENTMCNC: 31 G/DL (ref 32–36)
MCV RBC AUTO: 92 FL (ref 82–98)
MCV RBC AUTO: 92 FL (ref 82–98)
MONOCYTES # BLD AUTO: 0.8 K/UL (ref 0.3–1)
MONOCYTES # BLD AUTO: 0.8 K/UL (ref 0.3–1)
MONOCYTES NFR BLD: 18.3 % (ref 4–15)
MONOCYTES NFR BLD: 18.3 % (ref 4–15)
NEUTROPHILS # BLD AUTO: 2.9 K/UL (ref 1.8–7.7)
NEUTROPHILS # BLD AUTO: 2.9 K/UL (ref 1.8–7.7)
NEUTROPHILS NFR BLD: 62.2 % (ref 38–73)
NEUTROPHILS NFR BLD: 62.2 % (ref 38–73)
NRBC BLD-RTO: 3 /100 WBC
NRBC BLD-RTO: 3 /100 WBC
PHOSPHATE SERPL-MCNC: 3.7 MG/DL (ref 2.7–4.5)
PLATELET # BLD AUTO: 234 K/UL (ref 150–350)
PLATELET # BLD AUTO: 234 K/UL (ref 150–350)
PMV BLD AUTO: 10.5 FL (ref 9.2–12.9)
PMV BLD AUTO: 10.5 FL (ref 9.2–12.9)
POTASSIUM SERPL-SCNC: 3.7 MMOL/L (ref 3.5–5.1)
POTASSIUM SERPL-SCNC: 3.7 MMOL/L (ref 3.5–5.1)
PROT SERPL-MCNC: 6.6 G/DL (ref 6–8.4)
RBC # BLD AUTO: 2.94 M/UL (ref 4–5.4)
RBC # BLD AUTO: 2.94 M/UL (ref 4–5.4)
SODIUM SERPL-SCNC: 136 MMOL/L (ref 136–145)
SODIUM SERPL-SCNC: 136 MMOL/L (ref 136–145)
VANCOMYCIN SERPL-MCNC: 12.8 UG/ML
WBC # BLD AUTO: 4.58 K/UL (ref 3.9–12.7)
WBC # BLD AUTO: 4.58 K/UL (ref 3.9–12.7)

## 2019-09-10 PROCEDURE — 94761 N-INVAS EAR/PLS OXIMETRY MLT: CPT

## 2019-09-10 PROCEDURE — 63600175 PHARM REV CODE 636 W HCPCS: Performed by: INTERNAL MEDICINE

## 2019-09-10 PROCEDURE — 80202 ASSAY OF VANCOMYCIN: CPT

## 2019-09-10 PROCEDURE — 94640 AIRWAY INHALATION TREATMENT: CPT

## 2019-09-10 PROCEDURE — 36415 COLL VENOUS BLD VENIPUNCTURE: CPT

## 2019-09-10 PROCEDURE — 25000003 PHARM REV CODE 250: Performed by: INTERNAL MEDICINE

## 2019-09-10 PROCEDURE — 83735 ASSAY OF MAGNESIUM: CPT

## 2019-09-10 PROCEDURE — 80053 COMPREHEN METABOLIC PANEL: CPT

## 2019-09-10 PROCEDURE — 25000242 PHARM REV CODE 250 ALT 637 W/ HCPCS: Performed by: INTERNAL MEDICINE

## 2019-09-10 PROCEDURE — 25000003 PHARM REV CODE 250: Performed by: NURSE PRACTITIONER

## 2019-09-10 PROCEDURE — 27000221 HC OXYGEN, UP TO 24 HOURS

## 2019-09-10 PROCEDURE — 21000000 HC CCU ICU ROOM CHARGE

## 2019-09-10 PROCEDURE — 85025 COMPLETE CBC W/AUTO DIFF WBC: CPT

## 2019-09-10 PROCEDURE — 84100 ASSAY OF PHOSPHORUS: CPT

## 2019-09-10 RX ORDER — VANCOMYCIN HCL IN 5 % DEXTROSE 1G/250ML
1000 PLASTIC BAG, INJECTION (ML) INTRAVENOUS ONCE
Status: COMPLETED | OUTPATIENT
Start: 2019-09-10 | End: 2019-09-10

## 2019-09-10 RX ORDER — DIPHENHYDRAMINE HYDROCHLORIDE 50 MG/ML
6.25 INJECTION INTRAMUSCULAR; INTRAVENOUS EVERY 6 HOURS PRN
Status: DISCONTINUED | OUTPATIENT
Start: 2019-09-10 | End: 2019-09-11

## 2019-09-10 RX ADMIN — PIPERACILLIN AND TAZOBACTAM 3.38 G: 3; .375 INJECTION, POWDER, LYOPHILIZED, FOR SOLUTION INTRAVENOUS; PARENTERAL at 04:09

## 2019-09-10 RX ADMIN — MORPHINE SULFATE 4 MG: 4 INJECTION INTRAVENOUS at 09:09

## 2019-09-10 RX ADMIN — MORPHINE SULFATE 4 MG: 4 INJECTION INTRAVENOUS at 04:09

## 2019-09-10 RX ADMIN — METOPROLOL TARTRATE 2.5 MG: 1 INJECTION, SOLUTION INTRAVENOUS at 08:09

## 2019-09-10 RX ADMIN — DEXTROSE AND SODIUM CHLORIDE: 5; .9 INJECTION, SOLUTION INTRAVENOUS at 09:09

## 2019-09-10 RX ADMIN — BUDESONIDE 0.5 MG: 0.5 INHALANT RESPIRATORY (INHALATION) at 10:09

## 2019-09-10 RX ADMIN — Medication: at 09:09

## 2019-09-10 RX ADMIN — METOPROLOL TARTRATE 2.5 MG: 1 INJECTION, SOLUTION INTRAVENOUS at 05:09

## 2019-09-10 RX ADMIN — DIPHENHYDRAMINE HYDROCHLORIDE 6.25 MG: 50 INJECTION INTRAMUSCULAR; INTRAVENOUS at 10:09

## 2019-09-10 RX ADMIN — BUDESONIDE 0.5 MG: 0.5 INHALANT RESPIRATORY (INHALATION) at 09:09

## 2019-09-10 RX ADMIN — METOPROLOL TARTRATE 2.5 MG: 1 INJECTION, SOLUTION INTRAVENOUS at 04:09

## 2019-09-10 RX ADMIN — Medication 1000 MG: at 11:09

## 2019-09-10 RX ADMIN — PIPERACILLIN AND TAZOBACTAM 3.38 G: 3; .375 INJECTION, POWDER, LYOPHILIZED, FOR SOLUTION INTRAVENOUS; PARENTERAL at 05:09

## 2019-09-10 RX ADMIN — DILTIAZEM HYDROCHLORIDE 60 MG: 60 TABLET, FILM COATED ORAL at 08:09

## 2019-09-10 RX ADMIN — ENOXAPARIN SODIUM 40 MG: 100 INJECTION SUBCUTANEOUS at 06:09

## 2019-09-10 NOTE — PLAN OF CARE
09/10/19 1629   Discharge Reassessment   Assessment Type Discharge Planning Reassessment   Anticipated Discharge Disposition [facility]   Consult for hospice rec'd. Patient with 4 family members in room but they informed me that I needed to call her  Son to discuss his preferences. Nurse Germania informed me that the son has POA. I call the son Jovany [phone], left a message for him to call me back.

## 2019-09-10 NOTE — PROGRESS NOTES
VANCOMYCIN PHARMACOKINETIC NOTE:  Vancomycin Day # 4    Objective:    74 y.o., female, Actual Body Weight = 58 kg (127 lb 13.9 oz)    Diagnosis/Indication for Vancomycin: Sepsis, Wound Infection   Desired Vancomycin Peak:  30-35 mcg/ml; Desired Trough: 10-15 mcg/ml    Current Vancomycin Regimen:  Vancomycin 1250 mg IV x 1 dose 9/7/19 @ 18:43    The patient has the following labs:     9/10/2019 Estimated Creatinine Clearance: 8.1 mL/min (A) (based on SCr of 5.6 mg/dL (H)). Lab Results   Component Value Date    BUN 28 (H) 09/10/2019    BUN 28 (H) 09/10/2019       Lab Results   Component Value Date    WBC 4.58 09/10/2019    WBC 4.58 09/10/2019          Patient receives hemodialysis on Mondays, Wednesdays, and Fridays.    Random vancomycin:  15.9 mcg/mL collected 24 hours after Dose # 1  Random vancomycin:  12.8 mcg/mL collected 62 hours after Dose # 1    Assessment:  Renal function is: dependent on dialysis.  Vancomycin elimination is dependent on dialysis.    Plan:  Will schedule dose of Vancomycin 1000 mg IV x 1 dose for 9/10/19 at 11:00.  Will obtain random vancomycin level 9/12/19 with morning labs the morning after the next hemodialysis.    Pharmacy will continue to manage vancomycin therapy and adjust regimen as necessary.    Thank you for allowing us to participate in this patient's care.     Guido Claros 9/10/2019 10:03 AM  Department of Pharmacy  Ext [phone]

## 2019-09-10 NOTE — CARE UPDATE
09/09/19 2129   Patient Assessment/Suction   Level of Consciousness (AVPU) alert   Respiratory Effort Unlabored   Expansion/Accessory Muscles/Retractions expansion symmetric   LLL Breath Sounds wheezes, expiratory   RLL Breath Sounds wheezes, expiratory   Rhythm/Pattern, Respiratory pattern regular   Cough Type fair;nonproductive   PRE-TX-O2   O2 Device (Oxygen Therapy) nasal cannula w/ humidification   $ Is the patient on Low Flow Oxygen? Yes   Flow (L/min) 3   SpO2 (!) 94 %   Pulse 84   Resp 18   Aerosol Therapy   $ Aerosol Therapy Charges Aerosol Treatment;Mouth rinsed post treatment   Daily Review of Necessity (SVN) completed   Respiratory Treatment Status (SVN) given   Treatment Route (SVN) mask   Patient Position (SVN) semi-Clark's   Post Treatment Assessment (SVN) breath sounds improved   Signs of Intolerance (SVN) none   Breath Sounds Post-Respiratory Treatment   Throughout All Fields Post-Treatment All Fields   Throughout All Fields Post-Treatment aeration increased   Post-treatment Heart Rate (beats/min) 84   Post-treatment Resp Rate (breaths/min) 16

## 2019-09-10 NOTE — PLAN OF CARE
"   09/10/19 1710   Discharge Assessment   Assessment Type Discharge Planning Assessment   Confirmed/corrected address and phone number on facesheet? Yes  (Additional contact son Jovany Neely can be reached at [phone])   Assessment information obtained from? Other  (Pt's sister Elvi Hi)   Communicated expected length of stay with patient/caregiver no   Prior to hospitilization cognitive status: Unable to Assess  (Sisters report pt has issues with memory and "babbling")   Prior to hospitalization functional status: Assistive Equipment;Partially Dependent  (Family was assisting pt with sponge bath and food preparation; pt was able to feed herself)   Current cognitive status: Unable to Assess   Facility Arrived From: home   Lives With sibling(s)   Is patient able to care for self after discharge? No  (pt receives support from sister and brother with whom she lives)   Readmission Within the Last 30 Days previous discharge plan unsuccessful   If yes, most recent facility name: [facility]   Patient currently being followed by outpatient case management? No   Patient currently receives any other outside agency services? Yes   Name and contact number of agency or person providing outside services [facility]   Equipment Currently Used at Home hospital bed;walker, rolling;wheelchair   Do you have any problems affording any of your prescribed medications? TBD  (Sister states she has difficulty affording 1 med but cannot remember name. SW informed Jovany can be consulted if they can remember med.)   Is the patient taking medications as prescribed? yes   Does the patient have transportation home? Yes   Transportation Anticipated family or friend will provide   Dialysis Name and Scheduled days [facility] on [facility] Mo, We, Fri   DME Needed Upon Discharge  none     "

## 2019-09-10 NOTE — PROGRESS NOTES
"[facility]  Adult Nutrition  Progress Note    SUMMARY       Recommendations    1. Advance diet when medically appropriate per MD      2. If pt continues to remain NPO for the next 24 hours, recommend starting TF or clinimix/lipids . (NPO x 3 days)    3. If TF or clinimix/lipids desired, consult RD    Goals: Pt will advance to a diet and meet >50% of estimated needs via PO intake of meals  Nutrition Goal Status: new    Reason for Assessment    Reason For Assessment: consult  Diagnosis: infection/sepsis  Relevant Medical History: CHF, HTN, renal failuer, pancreatitis   Interdisciplinary Rounds: attended    Nutrition Risk Screen    Nutrition Risk Screen: dysphagia or difficulty swallowing    Nutrition/Diet History    Patient Reported Diet/Restrictions/Preferences: general  Spiritual, Cultural Beliefs, Pentecostal Practices, Values that Affect Care: no  Food Allergies: NKFA  Factors Affecting Nutritional Intake: impaired cognitive status/motor control, difficulty/impaired swallowing, NPO    Anthropometrics    Temp: 97.7 °F (36.5 °C)  Height: 5' 5" (165.1 cm)  Height (inches): 65 in  Weight Method: Bed Scale  Weight: 58 kg (127 lb 13.9 oz)  Weight (lb): 127.87 lb  Ideal Body Weight (IBW), Female: 125 lb  % Ideal Body Weight, Female (lb): 102.3 lb  BMI (Calculated): 21.3  BMI Grade: 18.5-24.9 - normal       Lab/Procedures/Meds    Pertinent Labs Reviewed: reviewed   9/10/2019 08:48   Sodium 136   Potassium 3.7   Chloride 100   CO2 24   Anion Gap 12   BUN, Bld 28 (H)   Creatinine 5.6 (H)   eGFR if non  7.0 (A)   eGFR if African American 8.0 (A)   Glucose 78   Calcium 7.9 (L)   Phosphorus 3.7   Magnesium 1.9   Alkaline Phosphatase 87   PROTEIN TOTAL 6.6   Albumin 2.4 (L)   BILIRUBIN TOTAL 1.0   AST 56 (H)   ALT 48 (H)     Pertinent Medications Reviewed: reviewed  Scheduled Meds:   sodium chloride 0.9%   Intravenous Once    aspirin  81 mg Oral Daily    budesonide  0.5 mg Nebulization BID    " calcitRIOL  0.25 mcg Oral Daily    diltiaZEM  180 mg Oral Daily    diltiaZEM  60 mg Oral Q8H    divalproex ER  750 mg Oral Daily    enoxaparin  40 mg Subcutaneous Daily    isosorbide mononitrate  30 mg Oral Daily    lacosamide  50 mg Oral Daily    Lactobacillus acidoph-L.bulgar  2 tablet Oral TID WM    levETIRAcetam  750 mg Oral Daily    losartan  25 mg Oral Daily    magnesium oxide  400 mg Oral Daily    metoprolol  2.5 mg Intravenous Q8H    mupirocin   Nasal BID    piperacillin-tazobactam (ZOSYN) IVPB  3.375 g Intravenous Q12H    senna-docusate 8.6-50 mg  1 tablet Oral BID    white petrolatum   Topical (Top) Daily     Continuous Infusions:   dextrose 5 % and 0.9 % NaCl 75 mL/hr at 09/10/19 0930    dilTIAZem 5 mg/hr (09/09/19 0954)     Estimated/Assessed Needs    Weight Used For Calorie Calculations: 57.6 kg (127 lb)  Energy Calorie Requirements (kcal): 2583-2520 kcal/day  Energy Need Method: Kcal/kg  Protein Requirements: 69-98 g/protein   Weight Used For Protein Calculations: 57.6 kg (127 lb)     Estimated Fluid Requirement Method: RDA Method  RDA Method (mL): 1440     Nutrition Prescription Ordered    Current Diet Order: NPO    Evaluation of Received Nutrient/Fluid Intake    Energy Calories Required: not meeting needs  Protein Required: not meeting needs  Fluid Required: not meeting needs  Tolerance: other (see comments)(NPO)  % Intake of Estimated Energy Needs: Other: NPO  % Meal Intake: NPO    Nutrition Risk    Level of Risk/Frequency of Follow-up: high     Assessment and Plan    Patient has been NPO for 3 days. Recommend initiating TF or clinimix/lipids. Consult RD if desired. Will continue to monitor NPO statues and labs. Hospice consult has been placed and son understands mothers poor prognosis.    Monitor and Evaluation    Food and Nutrient Adminstration: diet order  Physical Activity and Function: nutrition-related ADLs and IADLs  Anthropometric Measurements: weight, weight  change  Biochemical Data, Medical Tests and Procedures: electrolyte and renal panel, lipid profile, gastrointestinal profile, glucose/endocrine profile, inflammatory profile  Nutrition-Focused Physical Findings: overall appearance     Nutrition Follow-Up    RD Follow-up?: Yes  Chelo Asif  09/10/2019  2:37 PM

## 2019-09-10 NOTE — PROGRESS NOTES
[facility]  Nephrology  Progress Note    Patient Name: Griselda Neely  MRN: [id number]  Admission Date: 9/7/2019  Hospital Length of Stay: 3 days  Attending Provider: Amos Snyder MD   Primary Care Physician: Kalie Neely MD  Principal Problem:Severe sepsis      Subjective:     Interval History: somewhat restless & agitated this am (now 'DNR'); no obvious distress; s/p HD on 9/9/19 w/ net UF: approx 1.3L    Review of patient's allergies indicates:  No Known Allergies  Current Facility-Administered Medications   Medication Frequency    0.9%  NaCl infusion PRN    0.9%  NaCl infusion Once    acetaminophen tablet 650 mg Q4H PRN    acetaminophen tablet 650 mg Q8H PRN    albuterol nebulizer solution 2.5 mg Q4H PRN    aspirin chewable tablet 81 mg Daily    budesonide nebulizer solution 0.5 mg BID    calcitRIOL capsule 0.25 mcg Daily    dextrose 5 % and 0.9 % NaCl infusion Continuous    dextrose 50% injection 12.5 g PRN    dextrose 50% injection 25 g PRN    diltiaZEM 125 mg in D5W 125 mL infusion Continuous    diltiaZEM 24 hr capsule 180 mg Daily    diltiaZEM tablet 60 mg Q8H    divalproex ER 24 hr tablet 750 mg Daily    enoxaparin injection 40 mg Daily    glucagon (human recombinant) injection 1 mg PRN    glucose chewable tablet 16 g PRN    glucose chewable tablet 24 g PRN    HYDROcodone-acetaminophen  mg per tablet 1 tablet Q6H PRN    HYDROcodone-acetaminophen 5-325 mg per tablet 1 tablet Q6H PRN    insulin aspart U-100 pen 1-10 Units Q6H PRN    isosorbide mononitrate 24 hr tablet 30 mg Daily    lacosamide tablet 50 mg Daily    levETIRAcetam tablet 750 mg Daily    losartan tablet 25 mg Daily    magnesium oxide tablet 400 mg Daily    metoprolol injection 2.5 mg Q8H    morphine injection 4 mg Q4H PRN    mupirocin 2 % ointment BID    ondansetron injection 4 mg Q8H PRN    oxyCODONE-acetaminophen 5-325 mg per tablet 1 tablet Q4H PRN    piperacillin-tazobactam 3.375  g in dextrose 5 % 50 mL IVPB (ready to mix system) Q12H    promethazine (PHENERGAN) 6.25 mg in dextrose 5 % 50 mL IVPB Q6H PRN    senna-docusate 8.6-50 mg per tablet 1 tablet BID    sodium chloride 0.9% flush 2 mL PRN    traMADol tablet 50 mg Q6H PRN    white petrolatum 41 % ointment Daily       Objective:     Vital Signs (Most Recent):  Temp: 97.8 °F (36.6 °C) (09/09/19 2220)  Pulse: (!) 120 (09/09/19 2220)  Resp: (!) 26 (09/09/19 2220)  BP: 134/82 (09/09/19 2220)  SpO2: (!) 94 % (09/09/19 2220)  O2 Device (Oxygen Therapy): nasal cannula w/ humidification (09/09/19 2129) Vital Signs (24h Range):  Temp:  [97.4 °F (36.3 °C)-98 °F (36.7 °C)] 97.8 °F (36.6 °C)  Pulse:  [] 120  Resp:  [18-27] 26  SpO2:  [91 %-98 %] 94 %  BP: (125-163)/(55-88) 134/82     Weight: 58 kg (127 lb 13.9 oz) (09/07/19 2300)  Body mass index is 21.28 kg/m².  Body surface area is 1.63 meters squared.    I/O last 3 completed shifts:  In: 1140 [I.V.:590; Other:500; IV Piggyback:50]  Out: 1801 [Other:1800; Stool:1]    Physical Exam   Constitutional: No distress.   Frail, chronically ill-appearing   HENT:   Head: Normocephalic and atraumatic.   B temporal wasting   Eyes: Right eye exhibits no discharge. Left eye exhibits no discharge. No scleral icterus.   B conjunctival injection   Neck: Normal range of motion. Neck supple. No JVD present.   Cardiovascular: Normal rate and intact distal pulses. Exam reveals no gallop and no friction rub.   Murmur heard.  Irregularly irregular rhythm   Pulmonary/Chest: Effort normal and breath sounds normal. No respiratory distress. She has no rales.   Abdominal: Soft. She exhibits no distension and no mass. There is no tenderness.   Hypoactive BS in all quadrants   Genitourinary:   Genitourinary Comments: Deferred   Musculoskeletal: Normal range of motion. She exhibits no edema, tenderness or deformity.   R UE AV Fistula w/ good thrill   Neurological: She is alert. No cranial nerve deficit.   Skin: Skin  is warm and dry. She is not diaphoretic.   Psychiatric: Her behavior is normal.   Flat affect   Nursing note and vitals reviewed.      Significant Labs:sure  CBC:   Recent Labs   Lab 09/09/19  0527   WBC 5.71  5.71   RBC 3.14*  3.14*   HGB 9.1*  9.1*   HCT 28.4*  28.4*     256   MCV 90  90   MCH 29.0  29.0   MCHC 32.0  32.0     CMP:   Recent Labs   Lab 09/09/19  0527   *  115*   CALCIUM 8.5*  8.5*   ALBUMIN 2.7*   PROT 7.0     142   K 3.7  3.7   CO2 27  27   CL 99  99   BUN 59*  59*   CREATININE 8.4*  8.4*   ALKPHOS 89   ALT 52*   AST 82*   BILITOT 1.1*       Significant Imaging:  X-Ray: Reviewed    Assessment/Plan:     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 [E83.59, L97.922] 07/20/2017 Yes    Atrial fibrillation with RVR [I48.91] 07/18/2017 Unknown    ESRD (end stage renal disease) [N18.6] 05/11/2016 Yes      Problems Resolved During this Admission:     1 ESRD (HD-MWF via R UE AV Fistula)- clinically stable at present; no overt volume overload, uremic signs/symptoms, electrolyte perturbations nor acid-base disturbance; no active issues    -maintenance HD (duration: 3h; UF Goal: 2-3L as tolerated;  STANDARD 'bath'; Access: AVF; Qb: 400 ml/min, Qd: 2x BFR; 12.5 gm of Na thiosulfate over last 30-60 min of treatment; 12.5-25 gm 25% Albumin w/ HD to counteract intradialytic hypotension) per outpatient schedule    -DAILY renal function panel to document sCr trend, optimize electrolyte 'bath' & assess response to therapy    -avoid nephrotoxic agents (NSAIDs, IV contrast dye, MRI w/ Gadolinium constrast)    -renally dose all appropriate medications, including antibiotics     2. HTN- clinically stable at present; BP AT GOAL (134/82) on current anti-HTN drig regimen  + HD-associated UF;  active issues    -continue present management as documented above (GIVE BP MEDS POST HD on HD Days) +/- HD-associated UF     3. ANEMIA- clinically stable at present; H/H AT GOAL (9.1/28.4 on 9/9/19); no active issues    -trend DAILY CBC (H/H) to effect optimal blood-loss surveillance     4. SECONDARY HYPERPARATHYROIDISM (sHPT)-- clinically stable on current therapy (Na thiosulfate 12.5 gm x last 30-60 min of HD); no active issues    -continue dietary binder/Vitamin D +/- HD-associated calcimimetic therapy (Cinacalcet vs Etelcalcetide)        Thank you for your consult. I will follow-up with patient. Please contact us if you have any additional questions.    Dane Martinez III, MD  [facility]  [facility]  [phone] (C)

## 2019-09-10 NOTE — PLAN OF CARE
09/10/19 0907   Patient Assessment/Suction   Level of Consciousness (AVPU) alert   Respiratory Effort Normal;Unlabored   Expansion/Accessory Muscles/Retractions expansion symmetric;no use of accessory muscles   All Lung Fields Breath Sounds clear   Rhythm/Pattern, Respiratory pattern regular   PRE-TX-O2   O2 Device (Oxygen Therapy) nasal cannula   $ Is the patient on Low Flow Oxygen? Yes   Flow (L/min) 2   SpO2 100 %   Pulse Oximetry Type Continuous   $ Pulse Oximetry - Multiple Charge Pulse Oximetry - Multiple   Pulse 100   Resp 18   Positioning HOB elevated 30 degrees   Aerosol Therapy   $ Aerosol Therapy Charges Aerosol Treatment   Respiratory Treatment Status (SVN) given   Treatment Route (SVN) mask;oxygen   Patient Position (SVN) HOB elevated   Post Treatment Assessment (SVN) breath sounds unchanged   Signs of Intolerance (SVN) none   Breath Sounds Post-Respiratory Treatment   Throughout All Fields Post-Treatment All Fields   Throughout All Fields Post-Treatment no change   Post-treatment Heart Rate (beats/min) 116   Post-treatment Resp Rate (breaths/min) 18

## 2019-09-11 LAB
ALBUMIN SERPL BCP-MCNC: 2.5 G/DL (ref 3.5–5.2)
ALBUMIN SERPL BCP-MCNC: 2.5 G/DL (ref 3.5–5.2)
ALP SERPL-CCNC: 102 U/L (ref 55–135)
ALP SERPL-CCNC: 102 U/L (ref 55–135)
ALT SERPL W/O P-5'-P-CCNC: 44 U/L (ref 10–44)
ALT SERPL W/O P-5'-P-CCNC: 44 U/L (ref 10–44)
ANION GAP SERPL CALC-SCNC: 14 MMOL/L (ref 8–16)
AST SERPL-CCNC: 49 U/L (ref 10–40)
AST SERPL-CCNC: 49 U/L (ref 10–40)
BASOPHILS # BLD AUTO: 0.04 K/UL (ref 0–0.2)
BASOPHILS # BLD AUTO: 0.04 K/UL (ref 0–0.2)
BASOPHILS NFR BLD: 0.9 % (ref 0–1.9)
BASOPHILS NFR BLD: 0.9 % (ref 0–1.9)
BILIRUB SERPL-MCNC: 1.1 MG/DL (ref 0.1–1)
BILIRUB SERPL-MCNC: 1.1 MG/DL (ref 0.1–1)
BUN SERPL-MCNC: 31 MG/DL (ref 8–23)
CALCIUM SERPL-MCNC: 8 MG/DL (ref 8.7–10.5)
CHLORIDE SERPL-SCNC: 100 MMOL/L (ref 95–110)
CO2 SERPL-SCNC: 23 MMOL/L (ref 23–29)
CREAT SERPL-MCNC: 6.6 MG/DL (ref 0.5–1.4)
DIFFERENTIAL METHOD: ABNORMAL
DIFFERENTIAL METHOD: ABNORMAL
EOSINOPHIL # BLD AUTO: 0.3 K/UL (ref 0–0.5)
EOSINOPHIL # BLD AUTO: 0.3 K/UL (ref 0–0.5)
EOSINOPHIL NFR BLD: 6.7 % (ref 0–8)
EOSINOPHIL NFR BLD: 6.7 % (ref 0–8)
ERYTHROCYTE [DISTWIDTH] IN BLOOD BY AUTOMATED COUNT: 23.5 % (ref 11.5–14.5)
EST. GFR  (AFRICAN AMERICAN): 6.6 ML/MIN/1.73 M^2
EST. GFR  (NON AFRICAN AMERICAN): 5.7 ML/MIN/1.73 M^2
GLUCOSE SERPL-MCNC: 109 MG/DL (ref 70–110)
GLUCOSE SERPL-MCNC: 52 MG/DL (ref 70–110)
GLUCOSE SERPL-MCNC: 60 MG/DL (ref 70–110)
GLUCOSE SERPL-MCNC: 63 MG/DL (ref 70–110)
GLUCOSE SERPL-MCNC: 85 MG/DL (ref 70–110)
HCT VFR BLD AUTO: 28 % (ref 37–48.5)
HGB BLD-MCNC: 8.8 G/DL (ref 12–16)
IMM GRANULOCYTES # BLD AUTO: 0.03 K/UL (ref 0–0.04)
IMM GRANULOCYTES # BLD AUTO: 0.03 K/UL (ref 0–0.04)
IMM GRANULOCYTES NFR BLD AUTO: 0.7 % (ref 0–0.5)
IMM GRANULOCYTES NFR BLD AUTO: 0.7 % (ref 0–0.5)
LYMPHOCYTES # BLD AUTO: 0.7 K/UL (ref 1–4.8)
LYMPHOCYTES # BLD AUTO: 0.7 K/UL (ref 1–4.8)
LYMPHOCYTES NFR BLD: 16.1 % (ref 18–48)
LYMPHOCYTES NFR BLD: 16.1 % (ref 18–48)
MAGNESIUM SERPL-MCNC: 2.1 MG/DL (ref 1.6–2.6)
MCH RBC QN AUTO: 28.5 PG (ref 27–31)
MCHC RBC AUTO-ENTMCNC: 31.4 G/DL (ref 32–36)
MCV RBC AUTO: 91 FL (ref 82–98)
MONOCYTES # BLD AUTO: 0.8 K/UL (ref 0.3–1)
MONOCYTES # BLD AUTO: 0.8 K/UL (ref 0.3–1)
MONOCYTES NFR BLD: 17.5 % (ref 4–15)
MONOCYTES NFR BLD: 17.5 % (ref 4–15)
NEUTROPHILS # BLD AUTO: 2.6 K/UL (ref 1.8–7.7)
NEUTROPHILS # BLD AUTO: 2.6 K/UL (ref 1.8–7.7)
NEUTROPHILS NFR BLD: 58.1 % (ref 38–73)
NEUTROPHILS NFR BLD: 58.1 % (ref 38–73)
NRBC BLD-RTO: 3 /100 WBC
NRBC BLD-RTO: 3 /100 WBC
PHOSPHATE SERPL-MCNC: 4.3 MG/DL (ref 2.7–4.5)
PLATELET # BLD AUTO: 212 K/UL (ref 150–350)
PMV BLD AUTO: 10.1 FL (ref 9.2–12.9)
POTASSIUM SERPL-SCNC: 3.9 MMOL/L (ref 3.5–5.1)
PROT SERPL-MCNC: 6.8 G/DL (ref 6–8.4)
PROT SERPL-MCNC: 6.8 G/DL (ref 6–8.4)
RBC # BLD AUTO: 3.09 M/UL (ref 4–5.4)
SODIUM SERPL-SCNC: 137 MMOL/L (ref 136–145)
WBC # BLD AUTO: 4.46 K/UL (ref 3.9–12.7)

## 2019-09-11 PROCEDURE — 25000003 PHARM REV CODE 250: Performed by: NURSE PRACTITIONER

## 2019-09-11 PROCEDURE — 25000003 PHARM REV CODE 250: Performed by: INTERNAL MEDICINE

## 2019-09-11 PROCEDURE — 94761 N-INVAS EAR/PLS OXIMETRY MLT: CPT

## 2019-09-11 PROCEDURE — 85025 COMPLETE CBC W/AUTO DIFF WBC: CPT

## 2019-09-11 PROCEDURE — 83735 ASSAY OF MAGNESIUM: CPT

## 2019-09-11 PROCEDURE — 25000242 PHARM REV CODE 250 ALT 637 W/ HCPCS: Performed by: INTERNAL MEDICINE

## 2019-09-11 PROCEDURE — 90935 HEMODIALYSIS ONE EVALUATION: CPT

## 2019-09-11 PROCEDURE — 63600175 PHARM REV CODE 636 W HCPCS: Performed by: INTERNAL MEDICINE

## 2019-09-11 PROCEDURE — 21000000 HC CCU ICU ROOM CHARGE

## 2019-09-11 PROCEDURE — 84100 ASSAY OF PHOSPHORUS: CPT

## 2019-09-11 PROCEDURE — 36415 COLL VENOUS BLD VENIPUNCTURE: CPT

## 2019-09-11 PROCEDURE — 94640 AIRWAY INHALATION TREATMENT: CPT

## 2019-09-11 PROCEDURE — 80053 COMPREHEN METABOLIC PANEL: CPT

## 2019-09-11 RX ORDER — MUPIROCIN 20 MG/G
OINTMENT TOPICAL 2 TIMES DAILY
Status: DISCONTINUED | OUTPATIENT
Start: 2019-09-11 | End: 2019-09-12 | Stop reason: HOSPADM

## 2019-09-11 RX ORDER — SODIUM CHLORIDE 9 MG/ML
INJECTION, SOLUTION INTRAVENOUS
Status: CANCELLED | OUTPATIENT
Start: 2019-09-11

## 2019-09-11 RX ORDER — AMOXICILLIN AND CLAVULANATE POTASSIUM 500; 125 MG/1; MG/1
1 TABLET, FILM COATED ORAL 3 TIMES DAILY
Status: DISCONTINUED | OUTPATIENT
Start: 2019-09-11 | End: 2019-09-12 | Stop reason: HOSPADM

## 2019-09-11 RX ORDER — SODIUM CHLORIDE 9 MG/ML
INJECTION, SOLUTION INTRAVENOUS ONCE
Status: CANCELLED | OUTPATIENT
Start: 2019-09-11 | End: 2019-09-11

## 2019-09-11 RX ORDER — METOPROLOL SUCCINATE 50 MG/1
50 TABLET, EXTENDED RELEASE ORAL DAILY
Status: DISCONTINUED | OUTPATIENT
Start: 2019-09-11 | End: 2019-09-12 | Stop reason: HOSPADM

## 2019-09-11 RX ADMIN — PIPERACILLIN AND TAZOBACTAM 3.38 G: 3; .375 INJECTION, POWDER, LYOPHILIZED, FOR SOLUTION INTRAVENOUS; PARENTERAL at 05:09

## 2019-09-11 RX ADMIN — HYDROCODONE BITARTRATE AND ACETAMINOPHEN 1 TABLET: 10; 325 TABLET ORAL at 02:09

## 2019-09-11 RX ADMIN — MUPIROCIN: 20 OINTMENT TOPICAL at 10:09

## 2019-09-11 RX ADMIN — BUDESONIDE 0.5 MG: 0.5 INHALANT RESPIRATORY (INHALATION) at 07:09

## 2019-09-11 RX ADMIN — ASPIRIN 81 MG 81 MG: 81 TABLET ORAL at 03:09

## 2019-09-11 RX ADMIN — ALBUTEROL SULFATE 2.5 MG: 2.5 SOLUTION RESPIRATORY (INHALATION) at 07:09

## 2019-09-11 RX ADMIN — ISOSORBIDE MONONITRATE 30 MG: 30 TABLET, EXTENDED RELEASE ORAL at 03:09

## 2019-09-11 RX ADMIN — LOSARTAN POTASSIUM 25 MG: 25 TABLET, FILM COATED ORAL at 03:09

## 2019-09-11 RX ADMIN — DILTIAZEM HYDROCHLORIDE 60 MG: 60 TABLET, FILM COATED ORAL at 03:09

## 2019-09-11 RX ADMIN — LACTOBACILLUS TAB 2 TABLET: TAB at 03:09

## 2019-09-11 RX ADMIN — METOPROLOL SUCCINATE 50 MG: 50 TABLET, FILM COATED, EXTENDED RELEASE ORAL at 05:09

## 2019-09-11 RX ADMIN — DEXTROSE 12.5 G: 50 INJECTION, SOLUTION INTRAVENOUS at 06:09

## 2019-09-11 RX ADMIN — CALCITRIOL CAPSULES 0.25 MCG 0.25 MCG: 0.25 CAPSULE ORAL at 03:09

## 2019-09-11 RX ADMIN — MAGNESIUM OXIDE 400 MG: 400 TABLET ORAL at 03:09

## 2019-09-11 RX ADMIN — AMOXICILLIN AND CLAVULANATE POTASSIUM 500 MG: 500; 125 TABLET, FILM COATED ORAL at 10:09

## 2019-09-11 RX ADMIN — METOPROLOL TARTRATE 2.5 MG: 1 INJECTION, SOLUTION INTRAVENOUS at 05:09

## 2019-09-11 RX ADMIN — ENOXAPARIN SODIUM 40 MG: 100 INJECTION SUBCUTANEOUS at 05:09

## 2019-09-11 RX ADMIN — DILTIAZEM HYDROCHLORIDE 60 MG: 60 TABLET, FILM COATED ORAL at 05:09

## 2019-09-11 RX ADMIN — AMOXICILLIN AND CLAVULANATE POTASSIUM 500 MG: 500; 125 TABLET, FILM COATED ORAL at 05:09

## 2019-09-11 RX ADMIN — LACTOBACILLUS TAB 2 TABLET: TAB at 05:09

## 2019-09-11 RX ADMIN — DILTIAZEM HYDROCHLORIDE 60 MG: 60 TABLET, FILM COATED ORAL at 10:09

## 2019-09-11 NOTE — PLAN OF CARE
09/11/19 1726   Discharge Reassessment   Assessment Type Discharge Planning Reassessment   Dr Maria informed me that the patient is more alert today and informed him that she does not want hospice. He has told me not to continue with this consult. I asked him if he would cancel the consult.

## 2019-09-11 NOTE — SUBJECTIVE & OBJECTIVE
Interval History:     Review of Systems   Unable to perform ROS: Mental status change     Objective:     Vital Signs (Most Recent):  Temp: 98.3 °F (36.8 °C) (09/10/19 1514)  Pulse: (!) 121 (09/10/19 1514)  Resp: 16 (09/10/19 1514)  BP: (!) 160/74 (09/10/19 1514)  SpO2: (!) 93 % (09/10/19 1152) Vital Signs (24h Range):  Temp:  [97.7 °F (36.5 °C)-98.3 °F (36.8 °C)] 98.3 °F (36.8 °C)  Pulse:  [] 121  Resp:  [16-26] 16  SpO2:  [93 %-100 %] 93 %  BP: (134-160)/(74-83) 160/74     Weight: 58 kg (127 lb 13.9 oz)  Body mass index is 21.28 kg/m².  No intake or output data in the 24 hours ending 09/10/19 2008   Physical Exam   Constitutional: She appears distressed.   Eyes: Pupils are equal, round, and reactive to light. EOM are normal.   Neck: Neck supple.   Cardiovascular:   Irregular rhythm and tachycardic   Pulmonary/Chest: Effort normal. She has rales.   Abdominal: Soft. Bowel sounds are normal.   Musculoskeletal: Normal range of motion. She exhibits no edema.   Neurological:   Confused and agitated   Skin: Skin is warm.   Nursing note and vitals reviewed.      Significant Labs:   CBC:   Recent Labs   Lab 09/09/19  0527 09/10/19  0848   WBC 5.71  5.71 4.58  4.58   HGB 9.1*  9.1* 8.4*  8.4*   HCT 28.4*  28.4* 27.1*  27.1*     256 234  234     CMP:   Recent Labs   Lab 09/09/19  0527 09/10/19  0848     142 136  136   K 3.7  3.7 3.7  3.7   CL 99  99 100  100   CO2 27  27 24  24   *  115* 78  78   BUN 59*  59* 28*  28*   CREATININE 8.4*  8.4* 5.6*  5.6*   CALCIUM 8.5*  8.5* 7.9*  7.9*   PROT 7.0 6.6   ALBUMIN 2.7* 2.4*   BILITOT 1.1* 1.0   ALKPHOS 89 87   AST 82* 56*   ALT 52* 48*   ANIONGAP 16  16 12  12   EGFRNONAA 4.3*  4.3* 7.0*  7.0*       Significant Imaging: I have reviewed all pertinent imaging results/findings within the past 24 hours.

## 2019-09-11 NOTE — HOSPITAL COURSE
Patient admitted with acute on chronic encephalopathy  Chronic encephalopathy with waxing and waning acute exacerbations has been going on for past couple of months  Patient has a history of admission with this problem almost every month this year  She has been extensively investigated from Neurology point of view, very recently and all the tests were normal  She has chronic end-stage renal disease and on hemodialysis with calciphylaxis as complication  She also has significant electrolyte abnormalities  This time also patient admitted with acute on chronic encephalopathy   Also found to have suspected sepsis from pneumonia and patient was treated with antibiotics  This time patient stayed in the hospital phone 5 days and all of sudden she completely came back to baseline mental status, this has happened before  Hospice care was discussed with power of  who is the son and he agreed with that  But when patient came back to baseline mental status ,she dont want to go for hospice and ultimately patient is discharged to home with home health

## 2019-09-11 NOTE — PLAN OF CARE
Problem: Adult Inpatient Plan of Care  Goal: Plan of Care Review  Outcome: Ongoing (interventions implemented as appropriate)  Pt A&O to person and place, requires reorientation to situation and time. Pt has episodes of incontience of bowel. Requires ritchie care q2 hours and prn. Bedside commode at bedside. Pt passed nursing bedside swallow study. PO medication adm, no issues noted. Cardiac monitoring, remains in a fib- HR remains <120. Dressing changed to left inner thigh, pt tolerated well. 02 on @ 2l/min, 02 remains >92%. Remains afebrile. Continue receiving IV abt therapy. Remains free from falls. Bed in low position, call light within reach, son at bedside. Pt and son verbalized understanding of plan of care.

## 2019-09-11 NOTE — PLAN OF CARE
09/11/19 1105   Discharge Reassessment   Assessment Type Discharge Planning Reassessment   Anticipated Discharge Disposition Rachid   Spoke with son Jovany [phone] to get his hospice preference. He informed me that the needs to speak with his aunt and he wants to make sure that the patient can still get dialysis. I informed him that usually HD can continue as long as the patient is not in hospice care for a dx related the renal system. I asked  him if he will be here and will be. I asked him to tell the nurse to call me when he arrives so that I can speak with him and give him the Senior Resource Guide so that a hospice can be chosen.

## 2019-09-11 NOTE — ASSESSMENT & PLAN NOTE
Multifactorial and Chronic issue per notes  She has admission every month for this condition  POA agreed to have hospice care

## 2019-09-11 NOTE — PLAN OF CARE
09/10/19 0671   Patient Assessment/Suction   Level of Consciousness (AVPU) alert   Respiratory Effort Normal;Unlabored   Expansion/Accessory Muscles/Retractions no use of accessory muscles   All Lung Fields Breath Sounds coarse  (few crackles noted rll)   PRE-TX-O2   O2 Device (Oxygen Therapy) nasal cannula   Flow (L/min) 2   SpO2 (!) 94 %   Pulse Oximetry Type Continuous   Pulse (!) 114   Resp (!) 24   Aerosol Therapy   $ Aerosol Therapy Charges Aerosol Treatment   Daily Review of Necessity (SVN) completed   Respiratory Treatment Status (SVN) given   Treatment Route (SVN) mask   Patient Position (SVN) HOB elevated   Post Treatment Assessment (SVN) breath sounds unchanged   Signs of Intolerance (SVN) none   Breath Sounds Post-Respiratory Treatment   Throughout All Fields Post-Treatment All Fields   Throughout All Fields Post-Treatment no change   Post-treatment Heart Rate (beats/min) 123   Post-treatment Resp Rate (breaths/min) 22   continue tx. As ordered

## 2019-09-11 NOTE — ASSESSMENT & PLAN NOTE
Multifactorial and Chronic issue per notes  She has admission every month for this condition  Patient has episodes of waxing and waning altered mental status which is multifactorial for the past few months  Today her mental status  is back to baseline and she want to go home

## 2019-09-11 NOTE — PROGRESS NOTES
Consult Note  Infectious Disease    Reason for Consult:  ?  Sepsis    HPI: Griselda Neely is a   74 y.o. female With a history of end-stage renal disease, referred to the emergency room on 09/07 because of tachycardia, confusion.  Her son states that starting on Saturday 9/7 she began to speak in a garbled way and he reports that she has been talking to dead relatives for about a week.  She was in the hospital from 8/06- 8/19 for altered mental status and pneumonia treated with antibiotics, Rocephin and vancomycin, initially IV and then oral Cefdinir and was started on anti epileptic medications(EEG with diffuse slowing)..  She was found to be febrile to 101°, in atrial fibrillation with rapid ventricular response, with lactic acidosis.  She was admitted to the Hospital Medicine service on sepsis protocol, broad-spectrum antibiotics with vancomycin and Zosyn with no focus of infection   readily apparent.  A chest x-ray shows vascular engorgement and cardiomegaly but I personally cannot exclude that there is not a right lower lobe infiltrate.  CT head with only atrophy and no acute changes  White blood cells were normal.  She was also found to have oral candidiasis, and chronic wounds of calciphylaxis.  She has had no further temperature elevations, is DNR officially, has mild elevation of procalcitonin (end-stage renal disease can make this elevated at baseline), lactic acid has returned to normal, blood cultures are negative. She was in dialysis at the time of rounds yesterday. She remains afebrile and blood cultures are still negative, with normal WBC. Son denies coughing, aspiration events, and she does continue to smoke. He is interested in hospice but does not want to stop dialysis. At home she is ambulatory, eating well until 9/7, but incontinent of stool.     9/11:  Chest x-ray from yesterday is improved.  She is just back from dialysis and is agitated and wants to go home.  Per nursing she ate her lunch.   She is alert and is oriented to hospital but is upset and is anxious for her son to come so she can go home.    EXAM & DIAGNOSTICS REVIEWED:   Vitals:     Temp:  [97.1 °F (36.2 °C)-98.9 °F (37.2 °C)]   Temp: 97.1 °F (36.2 °C) (09/11/19 0920)  Pulse: 96 (09/11/19 1130)  Resp: 18 (09/11/19 0920)  BP: 128/61 (09/11/19 1130)  SpO2: 95 % (09/11/19 0752)    Intake/Output Summary (Last 24 hours) at 9/11/2019 1401  Last data filed at 9/11/2019 0600  Gross per 24 hour   Intake 1050 ml   Output --   Net 1050 ml       General:  Alert, agitated, upset , wants to go home,    Eyes:  Anicteric, PERRL, EOMI  ENT:  No ulcers, exudates, thrush, nares patent,    Neck:  supple,    Lungs: RLL  crackles  Heart:  iRRR, no gallop/murmur/rub noted, 125 atrial fib  Abd:     :  anuric  Musc:  Joints without effusion, swelling, erythema, synovitis, .   Skin:  No rashes.    Wound:    consistent with  uninfected calciphylaxis. Very tender as usual. Very near her incontinent perineum    Neuro: arousable, attentive, speech clear,    face symmetric, moves all extremities, no focal weakness.    Psych:  Agitated and wants to go home  Lymphatic:        Extrem: Left arm edema, no erythema, phlebitis, cellulitis, warm and well perfused  VAD:  Peripheral. Right Arm AVF has no signs of infection  Isolation:  none    Lines/Tubes/Drains:    General Labs reviewed:  Recent Labs   Lab 09/09/19  0527 09/10/19  0848 09/11/19  0405   WBC 5.71  5.71 4.58  4.58 4.46  4.46  4.46   HGB 9.1*  9.1* 8.4*  8.4* 8.8*  8.8*  8.8*   HCT 28.4*  28.4* 27.1*  27.1* 28.0*  28.0*  28.0*     256 234  234 212  212  212       Recent Labs   Lab 09/09/19  0527 09/10/19  0848 09/11/19  0405     142 136  136 137  137  137   K 3.7  3.7 3.7  3.7 3.9  3.9  3.9   CL 99  99 100  100 100  100  100   CO2 27  27 24  24 23  23  23   BUN 59*  59* 28*  28* 31*  31*  31*   CREATININE 8.4*  8.4* 5.6*  5.6* 6.6*  6.6*  6.6*   CALCIUM 8.5*   8.5* 7.9*  7.9* 8.0*  8.0*  8.0*   PROT 7.0 6.6 6.8  6.8   BILITOT 1.1* 1.0 1.1*  1.1*   ALKPHOS 89 87 102  102   ALT 52* 48* 44  44   AST 82* 56* 49*  49*           Micro:  Microbiology Results (last 7 days)     Procedure Component Value Units Date/Time    Blood culture [[id number]] Collected:  09/07/19 1600    Order Status:  Completed Specimen:  Blood from Peripheral, Wrist, Left Updated:  09/10/19 1632     Blood Culture, Routine No Growth to date      No Growth to date      No Growth to date      No Growth to date    Blood culture [[id number]] Collected:  09/07/19 1615    Order Status:  Completed Specimen:  Blood from Peripheral, Hand, Left Updated:  09/10/19 1632     Blood Culture, Routine No Growth to date      No Growth to date      No Growth to date      No Growth to date    Blood culture [[id number]]     Order Status:  Canceled Specimen:  Blood     Blood culture [[id number]]     Order Status:  Canceled Specimen:  Blood         Imaging Reviewed:   CXR   CT head  Cardiology:    IMPRESSION & PLAN   1.  Right lower lob pneumonia, chest x-ray improved  2. At risk for aspiration due to altered mental status  3 Uninfected left leg calciphylaxis, at risk for contamination  4. ESRD, on dialysis  5. History of C. Difficile colitis  6. Agitation, intermittent confusion      Recommendations:  If all agree to her discharge I would recommend Augmentin 500 mg once a day in the afternoon for a week

## 2019-09-11 NOTE — PLAN OF CARE
Problem: Adult Inpatient Plan of Care  Goal: Plan of Care Review  Outcome: Ongoing (interventions implemented as appropriate)  Pt more alert today. Dialysis today with 2L off. Afib on monitor. Pt denies pain. Pt injury free at this time. Able to transfer to [facility] with assistance. Voices understanding of need to call for help before getting out of bed. Bed alarm on for safety. Pt tolerating diet

## 2019-09-11 NOTE — PROGRESS NOTES
[facility]phrology  Progress Note    Patient Name: Griselda Neely  MRN: [id number]  Admission Date: 9/7/2019  Hospital Length of Stay: 4 days  Attending Provider: Amos Snyder MD   Primary Care Physician: Kalie Neely MD  Principal Problem:Acute metabolic encephalopathy      Subjective:     Interval History: markedly agitated this am (now 'DNR'); no obvious distress    Review of patient's allergies indicates:  No Known Allergies  Current Facility-Administered Medications   Medication Frequency    0.9%  NaCl infusion PRN    0.9%  NaCl infusion Once    acetaminophen tablet 650 mg Q4H PRN    acetaminophen tablet 650 mg Q8H PRN    albuterol nebulizer solution 2.5 mg Q4H PRN    aspirin chewable tablet 81 mg Daily    budesonide nebulizer solution 0.5 mg BID    calcitRIOL capsule 0.25 mcg Daily    dextrose 5 % and 0.9 % NaCl infusion Continuous    dextrose 50% injection 12.5 g PRN    dextrose 50% injection 25 g PRN    diltiaZEM tablet 60 mg Q8H    diphenhydrAMINE injection 6.25 mg Q6H PRN    divalproex ER 24 hr tablet 750 mg Daily    enoxaparin injection 40 mg Daily    glucagon (human recombinant) injection 1 mg PRN    glucose chewable tablet 16 g PRN    glucose chewable tablet 24 g PRN    HYDROcodone-acetaminophen  mg per tablet 1 tablet Q6H PRN    HYDROcodone-acetaminophen 5-325 mg per tablet 1 tablet Q6H PRN    insulin aspart U-100 pen 1-10 Units Q6H PRN    isosorbide mononitrate 24 hr tablet 30 mg Daily    lacosamide tablet 50 mg Daily    Lactobacillus acidoph-L.bulgar 1 million cell tablet 2 tablet TID WM    levETIRAcetam tablet 750 mg Daily    losartan tablet 25 mg Daily    magnesium oxide tablet 400 mg Daily    metoprolol injection 2.5 mg Q8H    morphine injection 4 mg Q4H PRN    mupirocin 2 % ointment BID    ondansetron injection 4 mg Q8H PRN    oxyCODONE-acetaminophen 5-325 mg per tablet 1 tablet Q4H PRN    piperacillin-tazobactam 3.375 g in  dextrose 5 % 50 mL IVPB (ready to mix system) Q12H    promethazine (PHENERGAN) 6.25 mg in dextrose 5 % 50 mL IVPB Q6H PRN    senna-docusate 8.6-50 mg per tablet 1 tablet BID    sodium chloride 0.9% flush 2 mL PRN    traMADol tablet 50 mg Q6H PRN    white petrolatum 41 % ointment Daily       Objective:     Vital Signs (Most Recent):  Temp: 98.1 °F (36.7 °C) (09/11/19 0406)  Pulse: (!) 111 (09/11/19 0406)  Resp: (!) 28 (09/11/19 0406)  BP: (!) 158/84 (09/11/19 0406)  SpO2: (!) 94 % (09/11/19 0406)  O2 Device (Oxygen Therapy): nasal cannula (09/10/19 2215) Vital Signs (24h Range):  Temp:  [97.7 °F (36.5 °C)-98.9 °F (37.2 °C)] 98.1 °F (36.7 °C)  Pulse:  [100-122] 111  Resp:  [16-28] 28  SpO2:  [93 %-100 %] 94 %  BP: (135-166)/() 158/84     Weight: 58 kg (127 lb 13.9 oz) (09/07/19 2300)  Body mass index is 21.28 kg/m².  Body surface area is 1.63 meters squared.    I/O last 3 completed shifts:  In: 1050 [P.O.:100; I.V.:900; IV Piggyback:50]  Out: -     Physical Exam   Constitutional: No distress.   Frail, chronically ill-appearing   HENT:   Head: Normocephalic and atraumatic.   B temporal wasting   Eyes: Right eye exhibits no discharge. Left eye exhibits no discharge. No scleral icterus.   B conjunctival injection   Neck: Normal range of motion. Neck supple. No JVD present.   Cardiovascular: Normal rate and intact distal pulses. Exam reveals no gallop and no friction rub.   Murmur heard.  Irregularly irregular rhythm   Pulmonary/Chest: Effort normal and breath sounds normal. No respiratory distress. She has no rales.   Abdominal: Soft. She exhibits no distension and no mass. There is no tenderness.   Hypoactive BS in all quadrants   Genitourinary:   Genitourinary Comments: Deferred   Musculoskeletal: Normal range of motion. She exhibits no edema, tenderness or deformity.   R UE AV Fistula w/ good thrill   Neurological: She is alert. No cranial nerve deficit.   Skin: Skin is warm and dry. She is not diaphoretic.    Psychiatric: Her behavior is normal.   Flat affect   Nursing note and vitals reviewed.      Significant Labs:sure  CBC:   Recent Labs   Lab 09/11/19  0405   WBC 4.46  4.46  4.46   RBC 3.09*  3.09*  3.09*   HGB 8.8*  8.8*  8.8*   HCT 28.0*  28.0*  28.0*     212  212   MCV 91  91  91   MCH 28.5  28.5  28.5   MCHC 31.4*  31.4*  31.4*     CMP:   Recent Labs   Lab 09/11/19  0405   GLU 60*  60*  60*   CALCIUM 8.0*  8.0*  8.0*   ALBUMIN 2.5*  2.5*   PROT 6.8  6.8     137  137   K 3.9  3.9  3.9   CO2 23  23  23     100  100   BUN 31*  31*  31*   CREATININE 6.6*  6.6*  6.6*   ALKPHOS 102  102   ALT 44  44   AST 49*  49*   BILITOT 1.1*  1.1*       Significant Imaging:  X-Ray: Reviewed    Assessment/Plan:     Active Diagnoses:    Diagnosis Date Noted POA    PRINCIPAL PROBLEM:  Acute metabolic encephalopathy [G93.41] 09/07/2019 Yes    Severe sepsis [A41.9, R65.20] 09/07/2019 Yes    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 [E83.59, L97.922] 07/20/2017 Yes    Atrial fibrillation with RVR [I48.91] 07/18/2017 Unknown    ESRD (end stage renal disease) [N18.6] 05/11/2016 Yes      Problems Resolved During this Admission:     1 ESRD (HD-MWF via R UE AV Fistula)- clinically stable at present; no overt volume overload, uremic signs/symptoms, electrolyte perturbations nor acid-base disturbance; no active issues    -maintenance HD (duration: 3h; UF Goal: 2-3L as tolerated;  STANDARD 'bath'; Access: AVF; Qb: 400 ml/min, Qd: 2x BFR; 12.5 gm of Na thiosulfate over last 30-60 min of treatment; 12.5-25 gm 25% Albumin w/ HD to counteract intradialytic hypotension) per outpatient schedule    -DAILY renal function panel to document sCr trend, optimize electrolyte 'bath' & assess response to therapy    -avoid nephrotoxic agents (NSAIDs, IV contrast dye, MRI w/ Gadolinium constrast)    -renally dose all appropriate medications, including  antibiotics     2. HTN- clinically stable at present; BP NOT AT GOAL (158/84) on current anti-HTN drig regimen + HD-associated UF;  active issues    -continue present management as documented above (GIVE BP MEDS POST HD on HD Days) +/- HD-associated UF     3. ANEMIA- clinically stable at present; H/H AT GOAL (8.8/28.0); no active issues    -trend DAILY CBC (H/H) to effect optimal blood-loss surveillance     4. SECONDARY HYPERPARATHYROIDISM (sHPT)-- clinically stable on current therapy (Na thiosulfate 12.5 gm x last 30-60 min of HD); no active issues    -continue dietary binder/Vitamin D +/- HD-associated calcimimetic therapy (Cinacalcet vs Etelcalcetide)        Thank you for your consult. I will follow-up with patient. Please contact us if you have any additional questions.    Dane Martinez III, MD  [facility]  [facility]  [phone] (C)

## 2019-09-11 NOTE — SUBJECTIVE & OBJECTIVE
Interval History:     Review of Systems   Constitutional: Negative for activity change and appetite change.   HENT: Negative for congestion and dental problem.    Eyes: Negative for discharge and itching.   Respiratory: Negative for shortness of breath.    Cardiovascular: Negative for chest pain.   Gastrointestinal: Negative for abdominal distention and abdominal pain.   Endocrine: Negative for cold intolerance.   Genitourinary: Negative for difficulty urinating and dysuria.   Musculoskeletal: Negative for arthralgias and back pain.   Skin: Negative for color change.   Neurological: Negative for dizziness and facial asymmetry.   Hematological: Negative for adenopathy.   Psychiatric/Behavioral: Negative for agitation and behavioral problems.     Objective:     Vital Signs (Most Recent):  Temp: 97.1 °F (36.2 °C) (09/11/19 0920)  Pulse: 96 (09/11/19 1130)  Resp: 18 (09/11/19 0920)  BP: 128/61 (09/11/19 1130)  SpO2: 95 % (09/11/19 0752) Vital Signs (24h Range):  Temp:  [97.1 °F (36.2 °C)-98.9 °F (37.2 °C)] 97.1 °F (36.2 °C)  Pulse:  [] 96  Resp:  [16-33] 18  SpO2:  [92 %-98 %] 95 %  BP: (128-166)/() 128/61     Weight: 58 kg (127 lb 13.9 oz)  Body mass index is 21.28 kg/m².    Intake/Output Summary (Last 24 hours) at 9/11/2019 1450  Last data filed at 9/11/2019 1400  Gross per 24 hour   Intake 1170 ml   Output --   Net 1170 ml      Physical Exam   Constitutional: She is oriented to person, place, and time. No distress.   Eyes: Pupils are equal, round, and reactive to light. EOM are normal.   Neck: Neck supple.   Cardiovascular: Normal rate.   Irregular rhythm   Pulmonary/Chest: Effort normal and breath sounds normal.   Abdominal: Soft. Bowel sounds are normal.   Musculoskeletal: Normal range of motion. She exhibits no edema.   Neurological: She is alert and oriented to person, place, and time.   Skin: Skin is warm.   Psychiatric: She has a normal mood and affect.   Nursing note and vitals  reviewed.      Significant Labs:   CBC:   Recent Labs   Lab 09/10/19  0848 09/11/19  0405   WBC 4.58  4.58 4.46  4.46  4.46   HGB 8.4*  8.4* 8.8*  8.8*  8.8*   HCT 27.1*  27.1* 28.0*  28.0*  28.0*     234 212  212  212     CMP:   Recent Labs   Lab 09/10/19  0848 09/11/19  0405     136 137  137  137   K 3.7  3.7 3.9  3.9  3.9     100 100  100  100   CO2 24  24 23  23  23   GLU 78  78 60*  60*  60*   BUN 28*  28* 31*  31*  31*   CREATININE 5.6*  5.6* 6.6*  6.6*  6.6*   CALCIUM 7.9*  7.9* 8.0*  8.0*  8.0*   PROT 6.6 6.8  6.8   ALBUMIN 2.4* 2.5*  2.5*   BILITOT 1.0 1.1*  1.1*   ALKPHOS 87 102  102   AST 56* 49*  49*   ALT 48* 44  44   ANIONGAP 12  12 14  14  14   EGFRNONAA 7.0*  7.0* 5.7*  5.7*  5.7*       Significant Imaging: I have reviewed all pertinent imaging results/findings within the past 24 hours.

## 2019-09-11 NOTE — PLAN OF CARE
Problem: Adult Inpatient Plan of Care  Goal: Plan of Care Review     09/10/19 5356   Plan of Care Review   Plan of Care Reviewed With patient   Progress no change     Pt less combative today. Pt injury free, family at bedside. Son who is POA, has requested hospice. CM consult. Afib on monitor, rate controlled. VSS. Pain adequately controlled with PRN pain medication

## 2019-09-11 NOTE — NURSING
Received clarification order to d/c cardizem gtt and continue to adm po cardizem, HR remain <120 per .

## 2019-09-11 NOTE — PT/OT/SLP PROGRESS
Speech Language Pathology      Griselda Neely  MRN: [id number]    Patient not seen today secondary to (refusal ). Diet initiated by medical team. Pt to discharge on hospice. No followup indicated.     Codey Glover, DADA-SLP

## 2019-09-11 NOTE — PROGRESS NOTES
[facility] Medicine  Progress Note    Patient Name: Griselda Neely  MRN: [id number]  Patient Class: IP- Inpatient   Admission Date: 9/7/2019  Length of Stay: 3 days  Attending Physician: Amos Snyder MD  Primary Care Provider: Kalie Neely MD        Subjective:     Principal Problem:Acute metabolic encephalopathy        HPI:  74 year old female with PMH of ESRD-DD, Atrial fibrillation,  who was brought in with complaints of tachycardia. Son is at bedside and provides most of the history as paitent  is altered. He states that at baseline she is able to get up from bed and move around the house. However since last night she has been confused and lethargic   He denies any history of fevers at home. The patient is drowsy but arousable. Responds to painful stimuli, but does not follow commands.  On arrival to the ER, she was febrile with a rectal temp of 101, tachycardic into the 150s, telemetry monitoring shows atrial fibrillation. She also has an elevated lactate level.     Overview/Hospital Course:  Condition more or less same  Was in hospital every month , per chart review  Son interested in Hospice  care  Other family members still debating about it     Interval History:     Review of Systems   Unable to perform ROS: Mental status change     Objective:     Vital Signs (Most Recent):  Temp: 98.3 °F (36.8 °C) (09/10/19 1514)  Pulse: (!) 121 (09/10/19 1514)  Resp: 16 (09/10/19 1514)  BP: (!) 160/74 (09/10/19 1514)  SpO2: (!) 93 % (09/10/19 1152) Vital Signs (24h Range):  Temp:  [97.7 °F (36.5 °C)-98.3 °F (36.8 °C)] 98.3 °F (36.8 °C)  Pulse:  [] 121  Resp:  [16-26] 16  SpO2:  [93 %-100 %] 93 %  BP: (134-160)/(74-83) 160/74     Weight: 58 kg (127 lb 13.9 oz)  Body mass index is 21.28 kg/m².  No intake or output data in the 24 hours ending 09/10/19 2008   Physical Exam   Constitutional: She appears distressed.   Eyes: Pupils are equal, round, and reactive to light. EOM are normal.    Neck: Neck supple.   Cardiovascular:   Irregular rhythm and tachycardic   Pulmonary/Chest: Effort normal. She has rales.   Abdominal: Soft. Bowel sounds are normal.   Musculoskeletal: Normal range of motion. She exhibits no edema.   Neurological:   Confused and agitated   Skin: Skin is warm.   Nursing note and vitals reviewed.      Significant Labs:   CBC:   Recent Labs   Lab 09/09/19  0527 09/10/19  0848   WBC 5.71  5.71 4.58  4.58   HGB 9.1*  9.1* 8.4*  8.4*   HCT 28.4*  28.4* 27.1*  27.1*     256 234  234     CMP:   Recent Labs   Lab 09/09/19  0527 09/10/19  0848     142 136  136   K 3.7  3.7 3.7  3.7   CL 99  99 100  100   CO2 27  27 24  24   *  115* 78  78   BUN 59*  59* 28*  28*   CREATININE 8.4*  8.4* 5.6*  5.6*   CALCIUM 8.5*  8.5* 7.9*  7.9*   PROT 7.0 6.6   ALBUMIN 2.7* 2.4*   BILITOT 1.1* 1.0   ALKPHOS 89 87   AST 82* 56*   ALT 52* 48*   ANIONGAP 16  16 12  12   EGFRNONAA 4.3*  4.3* 7.0*  7.0*       Significant Imaging: I have reviewed all pertinent imaging results/findings within the past 24 hours.      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

## 2019-09-11 NOTE — PLAN OF CARE
09/11/19 0751   Patient Assessment/Suction   Level of Consciousness (AVPU) alert   Respiratory Effort Normal;Unlabored   Expansion/Accessory Muscles/Retractions no use of accessory muscles;no retractions   All Lung Fields Breath Sounds coarse   Rhythm/Pattern, Respiratory unlabored;pattern regular;depth regular   PRE-TX-O2   O2 Device (Oxygen Therapy) room air  (nasal cannula on standby)   SpO2 (!) 92 %   Pulse Oximetry Type Continuous   $ Pulse Oximetry - Multiple Charge Pulse Oximetry - Multiple   Pulse 72   Resp (!) 33   Aerosol Therapy   $ Aerosol Therapy Charges Aerosol Treatment   Daily Review of Necessity (SVN) completed   Respiratory Treatment Status (SVN) given   Treatment Route (SVN) mask;oxygen   Patient Position (SVN) semi-Clark's   Post Treatment Assessment (SVN) breath sounds unchanged   Signs of Intolerance (SVN) none   Breath Sounds Post-Respiratory Treatment   Post-treatment Heart Rate (beats/min) 98   Post-treatment Resp Rate (breaths/min) 16

## 2019-09-11 NOTE — PROGRESS NOTES
[facility] Medicine  Progress Note    Patient Name: Griselda Neely  MRN: [id number]  Patient Class: IP- Inpatient   Admission Date: 9/7/2019  Length of Stay: 4 days  Attending Physician: Amos Snyder MD  Primary Care Provider: Kalie Neely MD        Subjective:     Principal Problem:Acute metabolic encephalopathy        HPI:  74 year old female with PMH of ESRD-DD, Atrial fibrillation,  who was brought in with complaints of tachycardia. Son is at bedside and provides most of the history as paitent  is altered. He states that at baseline she is able to get up from bed and move around the house. However since last night she has been confused and lethargic   He denies any history of fevers at home. The patient is drowsy but arousable. Responds to painful stimuli, but does not follow commands.  On arrival to the ER, she was febrile with a rectal temp of 101, tachycardic into the 150s, telemetry monitoring shows atrial fibrillation. She also has an elevated lactate level.     Overview/Hospital Course:  Patient much more alert and oriented today  Had a talk with patient's cardiologist  For the past 3-4 months patient has waxing and waning altered mental status  Same thing seems like happened this time also  Patient is very anxious and wanted to go  Patient does not want hospice care    Interval History:     Review of Systems   Constitutional: Negative for activity change and appetite change.   HENT: Negative for congestion and dental problem.    Eyes: Negative for discharge and itching.   Respiratory: Negative for shortness of breath.    Cardiovascular: Negative for chest pain.   Gastrointestinal: Negative for abdominal distention and abdominal pain.   Endocrine: Negative for cold intolerance.   Genitourinary: Negative for difficulty urinating and dysuria.   Musculoskeletal: Negative for arthralgias and back pain.   Skin: Negative for color change.   Neurological: Negative for dizziness  and facial asymmetry.   Hematological: Negative for adenopathy.   Psychiatric/Behavioral: Negative for agitation and behavioral problems.     Objective:     Vital Signs (Most Recent):  Temp: 97.1 °F (36.2 °C) (09/11/19 0920)  Pulse: 96 (09/11/19 1130)  Resp: 18 (09/11/19 0920)  BP: 128/61 (09/11/19 1130)  SpO2: 95 % (09/11/19 0752) Vital Signs (24h Range):  Temp:  [97.1 °F (36.2 °C)-98.9 °F (37.2 °C)] 97.1 °F (36.2 °C)  Pulse:  [] 96  Resp:  [16-33] 18  SpO2:  [92 %-98 %] 95 %  BP: (128-166)/() 128/61     Weight: 58 kg (127 lb 13.9 oz)  Body mass index is 21.28 kg/m².    Intake/Output Summary (Last 24 hours) at 9/11/2019 1450  Last data filed at 9/11/2019 1400  Gross per 24 hour   Intake 1170 ml   Output --   Net 1170 ml      Physical Exam   Constitutional: She is oriented to person, place, and time. No distress.   Eyes: Pupils are equal, round, and reactive to light. EOM are normal.   Neck: Neck supple.   Cardiovascular: Normal rate.   Irregular rhythm   Pulmonary/Chest: Effort normal and breath sounds normal.   Abdominal: Soft. Bowel sounds are normal.   Musculoskeletal: Normal range of motion. She exhibits no edema.   Neurological: She is alert and oriented to person, place, and time.   Skin: Skin is warm.   Psychiatric: She has a normal mood and affect.   Nursing note and vitals reviewed.      Significant Labs:   CBC:   Recent Labs   Lab 09/10/19  0848 09/11/19  0405   WBC 4.58  4.58 4.46  4.46  4.46   HGB 8.4*  8.4* 8.8*  8.8*  8.8*   HCT 27.1*  27.1* 28.0*  28.0*  28.0*     234 212  212  212     CMP:   Recent Labs   Lab 09/10/19  0848 09/11/19  0405     136 137  137  137   K 3.7  3.7 3.9  3.9  3.9     100 100  100  100   CO2 24  24 23  23  23   GLU 78  78 60*  60*  60*   BUN 28*  28* 31*  31*  31*   CREATININE 5.6*  5.6* 6.6*  6.6*  6.6*   CALCIUM 7.9*  7.9* 8.0*  8.0*  8.0*   PROT 6.6 6.8  6.8   ALBUMIN 2.4* 2.5*  2.5*   BILITOT 1.0 1.1*   1.1*   ALKPHOS 87 102  102   AST 56* 49*  49*   ALT 48* 44  44   ANIONGAP 12  12 14  14  14   EGFRNONAA 7.0*  7.0* 5.7*  5.7*  5.7*       Significant Imaging: I have reviewed all pertinent imaging results/findings within the past 24 hours.      Assessment/Plan:      * Acute metabolic encephalopathy  Multifactorial and Chronic issue per notes  She has admission every month for this condition  Patient has episodes of waxing and waning altered mental status which is multifactorial for the past few months  Today her mental status  is back to baseline and she want to go home      Severe sepsis  From R sided pneumonia  Resolved.  Will start patient on p.o. 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

## 2019-09-12 VITALS
OXYGEN SATURATION: 100 % | RESPIRATION RATE: 25 BRPM | WEIGHT: 127.88 LBS | HEART RATE: 98 BPM | HEIGHT: 65 IN | SYSTOLIC BLOOD PRESSURE: 119 MMHG | TEMPERATURE: 98 F | DIASTOLIC BLOOD PRESSURE: 79 MMHG | BODY MASS INDEX: 21.31 KG/M2

## 2019-09-12 PROBLEM — A41.9 SEVERE SEPSIS: Status: RESOLVED | Noted: 2019-09-07 | Resolved: 2019-09-12

## 2019-09-12 PROBLEM — G93.41 ACUTE METABOLIC ENCEPHALOPATHY: Status: RESOLVED | Noted: 2019-09-07 | Resolved: 2019-09-12

## 2019-09-12 PROBLEM — I48.91 ATRIAL FIBRILLATION WITH RVR: Status: RESOLVED | Noted: 2017-07-18 | Resolved: 2019-09-12

## 2019-09-12 PROBLEM — R65.20 SEVERE SEPSIS: Status: RESOLVED | Noted: 2019-09-07 | Resolved: 2019-09-12

## 2019-09-12 LAB
ALBUMIN SERPL BCP-MCNC: 2.7 G/DL (ref 3.5–5.2)
ALBUMIN SERPL BCP-MCNC: 2.7 G/DL (ref 3.5–5.2)
ALP SERPL-CCNC: 99 U/L (ref 55–135)
ALP SERPL-CCNC: 99 U/L (ref 55–135)
ALT SERPL W/O P-5'-P-CCNC: 33 U/L (ref 10–44)
ALT SERPL W/O P-5'-P-CCNC: 33 U/L (ref 10–44)
ANION GAP SERPL CALC-SCNC: 13 MMOL/L (ref 8–16)
ANION GAP SERPL CALC-SCNC: 13 MMOL/L (ref 8–16)
AST SERPL-CCNC: 32 U/L (ref 10–40)
AST SERPL-CCNC: 32 U/L (ref 10–40)
BACTERIA BLD CULT: NORMAL
BACTERIA BLD CULT: NORMAL
BASOPHILS # BLD AUTO: 0.02 K/UL (ref 0–0.2)
BASOPHILS NFR BLD: 0.4 % (ref 0–1.9)
BILIRUB SERPL-MCNC: 1 MG/DL (ref 0.1–1)
BILIRUB SERPL-MCNC: 1 MG/DL (ref 0.1–1)
BUN SERPL-MCNC: 17 MG/DL (ref 8–23)
BUN SERPL-MCNC: 17 MG/DL (ref 8–23)
CALCIUM SERPL-MCNC: 8 MG/DL (ref 8.7–10.5)
CALCIUM SERPL-MCNC: 8 MG/DL (ref 8.7–10.5)
CHLORIDE SERPL-SCNC: 100 MMOL/L (ref 95–110)
CHLORIDE SERPL-SCNC: 100 MMOL/L (ref 95–110)
CO2 SERPL-SCNC: 24 MMOL/L (ref 23–29)
CO2 SERPL-SCNC: 24 MMOL/L (ref 23–29)
CREAT SERPL-MCNC: 4.8 MG/DL (ref 0.5–1.4)
CREAT SERPL-MCNC: 4.8 MG/DL (ref 0.5–1.4)
DIFFERENTIAL METHOD: ABNORMAL
EOSINOPHIL # BLD AUTO: 0.2 K/UL (ref 0–0.5)
EOSINOPHIL NFR BLD: 5.2 % (ref 0–8)
ERYTHROCYTE [DISTWIDTH] IN BLOOD BY AUTOMATED COUNT: 23.3 % (ref 11.5–14.5)
ERYTHROCYTE [DISTWIDTH] IN BLOOD BY AUTOMATED COUNT: 23.3 % (ref 11.5–14.5)
EST. GFR  (AFRICAN AMERICAN): 9.7 ML/MIN/1.73 M^2
EST. GFR  (AFRICAN AMERICAN): 9.7 ML/MIN/1.73 M^2
EST. GFR  (NON AFRICAN AMERICAN): 8.4 ML/MIN/1.73 M^2
EST. GFR  (NON AFRICAN AMERICAN): 8.4 ML/MIN/1.73 M^2
GLUCOSE SERPL-MCNC: 67 MG/DL (ref 70–110)
GLUCOSE SERPL-MCNC: 67 MG/DL (ref 70–110)
HCT VFR BLD AUTO: 29 % (ref 37–48.5)
HCT VFR BLD AUTO: 29 % (ref 37–48.5)
HGB BLD-MCNC: 8.9 G/DL (ref 12–16)
HGB BLD-MCNC: 8.9 G/DL (ref 12–16)
IMM GRANULOCYTES # BLD AUTO: 0.02 K/UL (ref 0–0.04)
IMM GRANULOCYTES NFR BLD AUTO: 0.4 % (ref 0–0.5)
LYMPHOCYTES # BLD AUTO: 1 K/UL (ref 1–4.8)
LYMPHOCYTES NFR BLD: 20.5 % (ref 18–48)
MAGNESIUM SERPL-MCNC: 2.1 MG/DL (ref 1.6–2.6)
MCH RBC QN AUTO: 28.3 PG (ref 27–31)
MCH RBC QN AUTO: 28.3 PG (ref 27–31)
MCHC RBC AUTO-ENTMCNC: 30.7 G/DL (ref 32–36)
MCHC RBC AUTO-ENTMCNC: 30.7 G/DL (ref 32–36)
MCV RBC AUTO: 92 FL (ref 82–98)
MCV RBC AUTO: 92 FL (ref 82–98)
MONOCYTES # BLD AUTO: 0.9 K/UL (ref 0.3–1)
MONOCYTES NFR BLD: 20.3 % (ref 4–15)
NEUTROPHILS # BLD AUTO: 2.5 K/UL (ref 1.8–7.7)
NEUTROPHILS NFR BLD: 53.2 % (ref 38–73)
NRBC BLD-RTO: 1 /100 WBC
PHOSPHATE SERPL-MCNC: 3.1 MG/DL (ref 2.7–4.5)
PLATELET # BLD AUTO: 223 K/UL (ref 150–350)
PLATELET # BLD AUTO: 223 K/UL (ref 150–350)
PMV BLD AUTO: 10.2 FL (ref 9.2–12.9)
PMV BLD AUTO: 10.2 FL (ref 9.2–12.9)
POTASSIUM SERPL-SCNC: 3.4 MMOL/L (ref 3.5–5.1)
POTASSIUM SERPL-SCNC: 3.4 MMOL/L (ref 3.5–5.1)
PROT SERPL-MCNC: 7 G/DL (ref 6–8.4)
PROT SERPL-MCNC: 7 G/DL (ref 6–8.4)
RBC # BLD AUTO: 3.15 M/UL (ref 4–5.4)
RBC # BLD AUTO: 3.15 M/UL (ref 4–5.4)
SODIUM SERPL-SCNC: 137 MMOL/L (ref 136–145)
SODIUM SERPL-SCNC: 137 MMOL/L (ref 136–145)
WBC # BLD AUTO: 4.64 K/UL (ref 3.9–12.7)
WBC # BLD AUTO: 4.64 K/UL (ref 3.9–12.7)

## 2019-09-12 PROCEDURE — 97116 GAIT TRAINING THERAPY: CPT

## 2019-09-12 PROCEDURE — 80053 COMPREHEN METABOLIC PANEL: CPT

## 2019-09-12 PROCEDURE — 25000003 PHARM REV CODE 250: Performed by: INTERNAL MEDICINE

## 2019-09-12 PROCEDURE — 36415 COLL VENOUS BLD VENIPUNCTURE: CPT

## 2019-09-12 PROCEDURE — 94640 AIRWAY INHALATION TREATMENT: CPT

## 2019-09-12 PROCEDURE — 83735 ASSAY OF MAGNESIUM: CPT

## 2019-09-12 PROCEDURE — 84100 ASSAY OF PHOSPHORUS: CPT

## 2019-09-12 PROCEDURE — 85025 COMPLETE CBC W/AUTO DIFF WBC: CPT

## 2019-09-12 PROCEDURE — 94761 N-INVAS EAR/PLS OXIMETRY MLT: CPT

## 2019-09-12 PROCEDURE — 97161 PT EVAL LOW COMPLEX 20 MIN: CPT

## 2019-09-12 PROCEDURE — 25000242 PHARM REV CODE 250 ALT 637 W/ HCPCS: Performed by: INTERNAL MEDICINE

## 2019-09-12 PROCEDURE — 27000221 HC OXYGEN, UP TO 24 HOURS

## 2019-09-12 RX ORDER — ENOXAPARIN SODIUM 100 MG/ML
30 INJECTION SUBCUTANEOUS EVERY 24 HOURS
Status: DISCONTINUED | OUTPATIENT
Start: 2019-09-12 | End: 2019-09-12 | Stop reason: HOSPADM

## 2019-09-12 RX ORDER — AMOXICILLIN AND CLAVULANATE POTASSIUM 500; 125 MG/1; MG/1
1 TABLET, FILM COATED ORAL
Qty: 7 TABLET | Refills: 0 | Status: SHIPPED | OUTPATIENT
Start: 2019-09-12 | End: 2019-09-19

## 2019-09-12 RX ADMIN — SENNOSIDES AND DOCUSATE SODIUM 1 TABLET: 8.6; 5 TABLET ORAL at 09:09

## 2019-09-12 RX ADMIN — CALCITRIOL CAPSULES 0.25 MCG 0.25 MCG: 0.25 CAPSULE ORAL at 09:09

## 2019-09-12 RX ADMIN — LOSARTAN POTASSIUM 25 MG: 25 TABLET, FILM COATED ORAL at 09:09

## 2019-09-12 RX ADMIN — MAGNESIUM OXIDE 400 MG: 400 TABLET ORAL at 09:09

## 2019-09-12 RX ADMIN — BUDESONIDE 0.5 MG: 0.5 INHALANT RESPIRATORY (INHALATION) at 08:09

## 2019-09-12 RX ADMIN — METOPROLOL SUCCINATE 50 MG: 50 TABLET, FILM COATED, EXTENDED RELEASE ORAL at 09:09

## 2019-09-12 RX ADMIN — AMOXICILLIN AND CLAVULANATE POTASSIUM 500 MG: 500; 125 TABLET, FILM COATED ORAL at 09:09

## 2019-09-12 RX ADMIN — DILTIAZEM HYDROCHLORIDE 60 MG: 60 TABLET, FILM COATED ORAL at 06:09

## 2019-09-12 RX ADMIN — ASPIRIN 81 MG 81 MG: 81 TABLET ORAL at 09:09

## 2019-09-12 RX ADMIN — LACTOBACILLUS TAB 2 TABLET: TAB at 09:09

## 2019-09-12 RX ADMIN — ISOSORBIDE MONONITRATE 30 MG: 30 TABLET, EXTENDED RELEASE ORAL at 09:09

## 2019-09-12 NOTE — PLAN OF CARE
09/12/19 1015   Discharge Reassessment   Assessment Type Discharge Planning Reassessment   Anticipated Discharge Disposition Home-Health   Discharge Plan A Home Health  (Patient will be a [facility] with [facility] )   DME Needed Upon Discharge  none   Patient choice form signed by patient/caregiver Yes  (Completed today at 0909 with her son.)   Post-Acute Status   Post-Acute Authorization Placement   Post-Acute Placement Status Referrals Sent  (To [facility] via Epic fax.)       [id number]--Per Heike at [email or web address], patient is accepted.

## 2019-09-12 NOTE — PROGRESS NOTES
Pharmacist Renal Dose Adjustment Note    Griselda Neely is a 74 y.o. female being treated with the medication Enoxaparin.    Patient Data:    Vital Signs (Most Recent):  Temp: 97.9 °F (36.6 °C) (09/12/19 0735)  Pulse: 98 (09/12/19 0840)  Resp: (!) 25 (09/12/19 0840)  BP: 119/79 (09/12/19 0840)  SpO2: 100 % (09/12/19 0823)   Vital Signs (72h Range):  Temp:  [97 °F (36.1 °C)-98.9 °F (37.2 °C)]   Pulse:  []   Resp:  [2-33]   BP: (108-166)/()   SpO2:  [79 %-100 %]      Recent Labs   Lab 09/10/19  0848 09/11/19  0405 09/12/19 0818   CREATININE 5.6*  5.6* 6.6*  6.6*  6.6* 4.8*  4.8*     Serum creatinine: 4.8 mg/dL (H) 09/12/19 0818  Estimated creatinine clearance: 9.4 mL/min (A) (CrCl < 30 mL/min)  BMI < 40    Enoxaparin 40 mg q24h will be changed to Enoxaparin 30 mg q24h per pharmacy renal dose adjustment protocol.     Pharmacist's Name: Summer Schuster  Pharmacist's Extension: [phone]

## 2019-09-12 NOTE — DISCHARGE SUMMARY
[facility] Medicine  Discharge Summary      Patient Name: Griselda Neely  MRN: [id number]  Admission Date: 9/7/2019  Hospital Length of Stay: 5 days  Discharge Date and Time:  09/12/2019 9:36 AM  Attending Physician: Amos Snyder MD   Discharging Provider: Franky Maria MD  Primary Care Provider: Kalie Neely MD      HPI:   74 year old female with PMH of ESRD-DD, Atrial fibrillation,  who was brought in with complaints of tachycardia. Son is at bedside and provides most of the history as paitent  is altered. He states that at baseline she is able to get up from bed and move around the house. However since last night she has been confused and lethargic   He denies any history of fevers at home. The patient is drowsy but arousable. Responds to painful stimuli, but does not follow commands.  On arrival to the ER, she was febrile with a rectal temp of 101, tachycardic into the 150s, telemetry monitoring shows atrial fibrillation. She also has an elevated lactate level.     * No surgery found *      Hospital Course:   Patient admitted with acute on chronic encephalopathy  Chronic encephalopathy with waxing and waning acute exacerbations has been going on for past couple of months  Patient has a history of admission with this problem almost every month this year  She has been extensively investigated from Neurology point of view, very recently and all the tests were normal  She has chronic end-stage renal disease and on hemodialysis with calciphylaxis as complication  She also has significant electrolyte abnormalities  This time also patient admitted with acute on chronic encephalopathy   Also found to have suspected sepsis from pneumonia and patient was treated with antibiotics  This time patient stayed in the hospital phone 5 days and all of sudden she completely came back to baseline mental status, this has happened before  Hospice care was discussed with power of  who is the son  and he agreed with that  But when patient came back to baseline mental status ,she dont want to go for hospice and ultimately patient is discharged to home with home health       Consults:   Consults (From admission, onward)        Status Ordering Provider     Inpatient consult to Cardiology  Once     Provider:  Jerrod Asif MD    Completed DELGADO GRANGER     Inpatient consult to Hospitalist  Once     Provider:  Adina Denis MD    Acknowledged ADINA DENIS     Inpatient consult to Infectious Diseases  Once     Provider:  Angelica Hobson MD    Completed DELGADO GRANGER     Inpatient consult to Nephrology  Once     Provider:  Joseph Ingram MD    Completed ADINA DENIS     Inpatient consult to Nephrology  Once     Provider:  Joseph Ingram MD    Acknowledged DELGADO GRANGER     Inpatient consult to Registered Dietitian/Nutritionist  Once     Provider:  (Not yet assigned)    DIOGO Del Angel     Inpatient consult to   Once     Provider:  (Not yet assigned)    Acknowledged DELGADO GRANGER          No new Assessment & Plan notes have been filed under this hospital service since the last note was generated.  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    Specialty:  Neurology  Contact  information:  [address]  [place] [address]  [phone]                 Patient Instructions:      Diet Cardiac     Diet renal     Activity as tolerated       Significant Diagnostic Studies: Labs:   CMP   Recent Labs   Lab 09/11/19  0405 09/12/19  0818     137  137 137  137   K 3.9  3.9  3.9 3.4*  3.4*     100  100 100  100   CO2 23  23  23 24  24   GLU 60*  60*  60* 67*  67*   BUN 31*  31*  31* 17  17   CREATININE 6.6*  6.6*  6.6* 4.8*  4.8*   CALCIUM 8.0*  8.0*  8.0* 8.0*  8.0*   PROT 6.8  6.8 7.0  7.0   ALBUMIN 2.5*  2.5* 2.7*  2.7*   BILITOT 1.1*  1.1* 1.0  1.0   ALKPHOS 102  102 99  99   AST 49*  49* 32  32   ALT 44  44 33  33   ANIONGAP 14  14  14 13  13   ESTGFRAFRICA 6.6*  6.6*  6.6* 9.7*  9.7*   EGFRNONAA 5.7*  5.7*  5.7* 8.4*  8.4*    and CBC   Recent Labs   Lab 09/11/19  0405 09/12/19  0818   WBC 4.46  4.46  4.46 4.64  4.64   HGB 8.8*  8.8*  8.8* 8.9*  8.9*   HCT 28.0*  28.0*  28.0* 29.0*  29.0*     212  212 223  223       Pending Diagnostic Studies:     None         Medications:  Reconciled Home Medications:      Medication List      START taking these medications    amoxicillin-clavulanate 500-125mg 500-125 mg Tab  Commonly known as:  AUGMENTIN  Take 1 tablet (500 mg total) by mouth after dinner. for 7 days        CHANGE how you take these medications    diltiaZEM 180 MG 24 hr capsule  Commonly known as:  CARDIZEM CD  Take 1 capsule (180 mg total) by mouth once daily.  What changed:  Another medication with the same name was removed. Continue taking this medication, and follow the directions you see here.        CONTINUE taking these medications    aspirin 81 MG Chew  Take 81 mg by mouth once daily.     calcitRIOL 0.25 MCG Cap  Commonly known as:  ROCALTROL  Take 0.25 mcg by mouth once daily.     divalproex  MG 24 hr tablet  Commonly known as:  DEPAKOTE ER  Take 750 mg by mouth once daily.     fluticasone  furoate-vilanterol 100-25 mcg/dose diskus inhaler  Commonly known as:  BREO  Inhale 1 puff into the lungs once daily. Controller     isosorbide mononitrate 30 MG 24 hr tablet  Commonly known as:  IMDUR  Take 30 mg by mouth once daily.     lacosamide 50 mg Tab  Commonly known as:  VIMPAT  Take 1 tablet (50 mg total) by mouth once daily.     levETIRAcetam 500 MG Tab  Commonly known as:  KEPPRA  Take 750 mg by mouth once daily.     losartan 25 MG tablet  Commonly known as:  COZAAR  Take 25 mg by mouth once daily.     magnesium oxide 400 mg (241.3 mg magnesium) tablet  Commonly known as:  MAG-OX  Take 400 mg by mouth once daily.     metoprolol succinate 50 MG 24 hr tablet  Commonly known as:  TOPROL-XL  Take 1 tablet (50 mg total) by mouth once daily.     ondansetron 4 MG tablet  Commonly known as:  ZOFRAN  Take 4 mg by mouth every 6 (six) hours as needed for Nausea.     oxyCODONE-acetaminophen 5-325 mg per tablet  Commonly known as:  PERCOCET  Take 1 tablet by mouth every 4 (four) hours as needed for Pain (with dialysis).     sevelamer carbonate 800 mg Tab  Commonly known as:  RENVELA  Take 800 mg by mouth 3 (three) times daily with meals.        STOP taking these medications    traMADol 50 mg tablet  Commonly known as:  ULTRAM            Indwelling Lines/Drains at time of discharge:   Lines/Drains/Airways     Drain                 Hemodialysis AV Fistula 08/08/19 0214 Right upper arm 35 days                Time spent on the discharge of patient:  26  minutes  Patient was seen and examined on the date of discharge and determined to be suitable for discharge.         Franky Maria MD  Department of Hospital Medicine  Count includes the [facility]

## 2019-09-12 NOTE — CARE UPDATE
09/11/19 1945   Patient Assessment/Suction   Level of Consciousness (AVPU) alert   Respiratory Effort Unlabored   Expansion/Accessory Muscles/Retractions no use of accessory muscles   All Lung Fields Breath Sounds clear;diminished   Rhythm/Pattern, Respiratory unlabored   Cough Frequency no cough   PRE-TX-O2   O2 Device (Oxygen Therapy) room air   SpO2 95 %   Pulse (!) 125   Resp (!) 2   Aerosol Therapy   $ Aerosol Therapy Charges Aerosol Treatment   Daily Review of Necessity (SVN) completed   Respiratory Treatment Status (SVN) given   Treatment Route (SVN) mask   Patient Position (SVN) semi-Clark's   Post Treatment Assessment (SVN) breath sounds improved   Signs of Intolerance (SVN) none   Breath Sounds Post-Respiratory Treatment   Throughout All Fields Post-Treatment All Fields   Throughout All Fields Post-Treatment aeration increased   Post-treatment Heart Rate (beats/min) 125   Post-treatment Resp Rate (breaths/min) 15

## 2019-09-12 NOTE — PT/OT/SLP EVAL
"Physical Therapy Evaluation and Discharge Note    Patient Name:  Griselda Neely   MRN:  [id number]    Recommendations:     Discharge Recommendations:  home health PT   Discharge Equipment Recommendations: none   Barriers to discharge: None    Assessment:     Griselda Neely is a 74 y.o. female admitted with a medical diagnosis of Acute metabolic encephalopathy. .  At this time, patient is functioning at their prior level of function and does not require further acute PT services. Pt participates with PT only to ambulate to restroom; Eager for discharge and not pleasant or exactly cooperative with PT; son states this is pt's baseline.    Recent Surgery: * No surgery found *      Plan:     During this hospitalization, patient does not require further acute PT services.  Please re-consult if situation changes.      Subjective     Chief Complaint: "I'm gonna walk out of here."  Patient/Family Comments/goals: home  Pain/Comfort:  · Pain Rating 1: 0/10    Patients cultural, spiritual, Anglican conflicts given the current situation:      Living Environment:  Pt lives at home with son present 24/7.  Prior to admission, patients level of function was ambulatory in home. Non compliant with RW per son.  Equipment used at home: walker, rolling, hospital bed.  DME owned (not currently used): none.  Upon discharge, patient will have assistance from son.    Objective:     Communicated with RN prior to session.  Patient found up in chair with oxygen upon PT entry to room.    General Precautions: Standard, fall   Orthopedic Precautions:    Braces:       Exams:  · Cognitive Exam:  Patient is oriented to Person  · RLE ROM: WFL  · RLE Strength: WFL  · LLE ROM: WFL  · LLE Strength: WFL    Functional Mobility:  · Transfers:     · Sit to Stand:  supervision with no AD  · Gait: 15' to restroom without AD with Supervision and then back to chair at bedside  · Balance: good in sitting; fair in standing    AM-PAC 6 CLICK MOBILITY  Total " Score:23       Therapeutic Activities and Exercises:   transfer training, toileting (independent)    AM-PAC 6 CLICK MOBILITY  Total Score:23     Patient left up in chair with call button in reach and son present.    GOALS:   Multidisciplinary Problems     Physical Therapy Goals     Not on file                History:     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    both legs    CHF (congestive heart failure)     Encounter for blood transfusion 03/2016    Gout     Hypertension     Mitral valve regurgitation     Osteoarthritis     Pancreatitis     Peripheral vascular disease     Peritoneal dialysis catheter in place     Pneumonia 09/09/2017    Renal failure        Past Surgical History:   Procedure Laterality Date    ACHILLES TENDON SURGERY Right     APPENDECTOMY      CARDIAC CATHETERIZATION  07/03/2017    CHOLECYSTECTOMY      heal surgery Right     HYSTERECTOMY      INSERTION-PERITONEAL DIALYSIS CATHETER-LAPAROSCOPIC N/A 4/26/2016    Performed by [facility] Cortes [facility] at [facility] OR    PARATHYROIDECTOMY      PARATHYROIDECTOMY  07/13/2017    PARATHYROIDECTOMY N/A 7/13/2017    Performed by Jhoan Willoughby MD at [facility] OR    PERITONEAL CATHETER INSERTION      REIMPLANTATION-PARATHYROID TISSUE N/A 7/13/2017    Performed by Jhoan Willoughby MD at [facility] OR    REMOVAL-CATHETER-DIALYSIS-PERITONEAL N/A 9/19/2017    Performed by Jhoan Willoughby MD at [facility] OR    RENAL BIOPSY      vocal cord nodule         Time Tracking:     PT Received On: 09/12/19  PT Start Time: 1051     PT Stop Time: 1101  PT Total Time (min): 10 min     Billable Minutes: Evaluation 2 and Gait Training 8      Heike Fraser, PT  09/12/2019

## 2019-09-12 NOTE — PLAN OF CARE
09/12/19 0823   Patient Assessment/Suction   Level of Consciousness (AVPU) alert   Respiratory Effort Normal;Unlabored   Expansion/Accessory Muscles/Retractions expansion symmetric;no use of accessory muscles   All Lung Fields Breath Sounds clear;diminished;equal bilaterally   PRE-TX-O2   O2 Device (Oxygen Therapy) nasal cannula w/ humidification   $ Is the patient on Low Flow Oxygen? Yes   Flow (L/min) 3  (decreased to 2L)   SpO2 100 %   Pulse Oximetry Type Continuous   $ Pulse Oximetry - Multiple Charge Pulse Oximetry - Multiple   Pulse 97  (AFib)   Resp 17   Positioning HOB elevated 30 degrees   Aerosol Therapy   $ Aerosol Therapy Charges Aerosol Treatment   Respiratory Treatment Status (SVN) given   Treatment Route (SVN) mask;oxygen   Patient Position (SVN) HOB elevated   Post Treatment Assessment (SVN) breath sounds unchanged   Signs of Intolerance (SVN) none   Breath Sounds Post-Respiratory Treatment   Throughout All Fields Post-Treatment All Fields   Throughout All Fields Post-Treatment no change   Post-treatment Heart Rate (beats/min) 106   Post-treatment Resp Rate (breaths/min) 17

## 2019-09-12 NOTE — PLAN OF CARE
09/12/19 1015   Discharge Reassessment   Assessment Type Discharge Planning Reassessment   Anticipated Discharge Disposition Home-Health   Dr Maria informed me that the patient is more alert today and has gotten OOB and ambulated a little. He plans to discharge her today with .

## 2019-09-12 NOTE — PROGRESS NOTES
Therapy with Vancomycin complete and / or consult / order discontinued by Dr. Hobson on 9/10/19 @ 09:32   Pharmacy will sign off, please re-consult as needed.  Thank you for allowing us to participate in this patient's care.  Alicia Villagomez 9/12/2019 3:48 AM  Dept of Pharmacy  Ext [phone]

## 2019-09-12 NOTE — PROGRESS NOTES
[facility]  Nephrology  Progress Note    Patient Name: Griselda Neely  MRN: [id number]  Admission Date: 9/7/2019  Hospital Length of Stay: 5 days  Attending Provider: Amos Snyder MD   Primary Care Physician: Kalie Neely MD  Principal Problem:Acute metabolic encephalopathy      Subjective:     Interval History: feels well this am (now 'DNR'); denies any specific complaints; back to 'baseline' mental status    Review of patient's allergies indicates:  No Known Allergies  Current Facility-Administered Medications   Medication Frequency    0.9%  NaCl infusion PRN    acetaminophen tablet 650 mg Q4H PRN    acetaminophen tablet 650 mg Q8H PRN    albuterol nebulizer solution 2.5 mg Q4H PRN    amoxicillin-clavulanate 500-125mg per tablet 500 mg TID    aspirin chewable tablet 81 mg Daily    budesonide nebulizer solution 0.5 mg BID    calcitRIOL capsule 0.25 mcg Daily    dextrose 50% injection 12.5 g PRN    dextrose 50% injection 25 g PRN    diltiaZEM tablet 60 mg Q8H    divalproex ER 24 hr tablet 750 mg Daily    enoxaparin injection 30 mg Daily    glucagon (human recombinant) injection 1 mg PRN    glucose chewable tablet 16 g PRN    glucose chewable tablet 24 g PRN    insulin aspart U-100 pen 1-10 Units Q6H PRN    isosorbide mononitrate 24 hr tablet 30 mg Daily    lacosamide tablet 50 mg Daily    Lactobacillus acidoph-L.bulgar 1 million cell tablet 2 tablet TID WM    levETIRAcetam tablet 750 mg Daily    losartan tablet 25 mg Daily    magnesium oxide tablet 400 mg Daily    metoprolol succinate (TOPROL-XL) 24 hr tablet 50 mg Daily    mupirocin 2 % ointment BID    ondansetron injection 4 mg Q8H PRN    senna-docusate 8.6-50 mg per tablet 1 tablet BID    sodium chloride 0.9% flush 2 mL PRN    traMADol tablet 50 mg Q6H PRN    white petrolatum 41 % ointment Daily       Objective:     Vital Signs (Most Recent):  Temp: 97.9 °F (36.6 °C) (09/12/19 0735)  Pulse: 98 (09/12/19  0840)  Resp: (!) 25 (09/12/19 0840)  BP: 119/79 (09/12/19 0840)  SpO2: 100 % (09/12/19 0823)  O2 Device (Oxygen Therapy): nasal cannula w/ humidification (09/12/19 0823) Vital Signs (24h Range):  Temp:  [97 °F (36.1 °C)-97.9 °F (36.6 °C)] 97.9 °F (36.6 °C)  Pulse:  [] 98  Resp:  [2-29] 25  SpO2:  [79 %-100 %] 100 %  BP: (108-161)/(58-87) 119/79     Weight: 58 kg (127 lb 13.9 oz) (09/07/19 2300)  Body mass index is 21.28 kg/m².  Body surface area is 1.63 meters squared.    I/O last 3 completed shifts:  In: 1670 [P.O.:220; I.V.:900; Other:500; IV Piggyback:50]  Out: 2500 [Other:2500]    Physical Exam   Constitutional: She is oriented to person, place, and time. No distress.   Frail, chronically ill-appearing   HENT:   Head: Normocephalic and atraumatic.   B temporal wasting   Eyes: Right eye exhibits no discharge. Left eye exhibits no discharge. No scleral icterus.   B conjunctival injection   Neck: Normal range of motion. Neck supple. No JVD present.   Cardiovascular: Normal rate and intact distal pulses. Exam reveals no gallop and no friction rub.   Murmur heard.  Irregularly irregular rhythm   Pulmonary/Chest: Effort normal and breath sounds normal. No respiratory distress. She has no rales.   Abdominal: Soft. She exhibits no distension and no mass. There is no tenderness.   Hypoactive BS in all quadrants   Genitourinary:   Genitourinary Comments: Deferred   Musculoskeletal: Normal range of motion. She exhibits no edema, tenderness or deformity.   R UE AV Fistula w/ good thrill   Neurological: She is alert and oriented to person, place, and time. No cranial nerve deficit.   Skin: Skin is warm and dry. She is not diaphoretic.   Psychiatric: Her behavior is normal.   Flat affect   Nursing note and vitals reviewed.      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 [E83.59, L97.922] 07/20/2017 Yes    ESRD (end stage renal disease) [N18.6] 05/11/2016 Yes      Problems Resolved During this Admission:    Diagnosis Date Noted Date Resolved POA    PRINCIPAL PROBLEM:  Acute metabolic encephalopathy [G93.41] 09/07/2019 09/12/2019 Yes    Severe sepsis [A41.9, R65.20] 09/07/2019 09/12/2019 Yes    Atrial fibrillation with RVR [I48.91] 07/18/2017 09/12/2019 Unknown     1 ESRD (HD-MWF via R UE AV Fistula)- clinically stable at present; no overt volume overload, uremic signs/symptoms, electrolyte perturbations nor acid-base disturbance; no active issues    -maintenance HD (duration: 3h; UF Goal: 2-3L as tolerated;  STANDARD 'bath'; Access: AVF; Qb: 400 ml/min, Qd: 2x BFR; 12.5 gm of Na thiosulfate over last 30-60 min of treatment; 12.5-25 gm 25% Albumin w/ HD to counteract intradialytic hypotension) per outpatient schedule    -DAILY renal function panel to document sCr trend, optimize electrolyte 'bath' & assess response to therapy    -avoid nephrotoxic agents (NSAIDs, IV contrast dye, MRI w/ Gadolinium constrast)    -renally dose all appropriate medications, including antibiotics     2. HTN- clinically stable at present; BP AT GOAL (119/79) on current anti-HTN drig regimen + HD-associated UF;  active issues    -continue present management as documented above (GIVE BP MEDS POST HD on HD Days) +/- HD-associated UF     3. ANEMIA- clinically stable at present; H/H AT GOAL (8.9/29.0); no active  issues    -trend DAILY CBC (H/H) to effect optimal blood-loss surveillance     4. SECONDARY HYPERPARATHYROIDISM (sHPT)-- clinically stable on current therapy (Na thiosulfate 12.5 gm x last 30-60 min of HD); no active issues    -continue dietary binder/Vitamin D +/- HD-associated calcimimetic therapy (Cinacalcet vs Etelcalcetide)        Thank you for your consult. I will follow-up with patient. Please contact us if you have any additional questions.    Dane Martinez III, MD  [facility]  [facility]  [phone] (C)

## 2019-09-13 ENCOUNTER — TELEPHONE (OUTPATIENT)
Dept: FAMILY MEDICINE | Facility: CLINIC | Age: 74
End: 2019-09-13

## 2019-09-23 ENCOUNTER — TELEPHONE (OUTPATIENT)
Dept: FAMILY MEDICINE | Facility: CLINIC | Age: 74
End: 2019-09-23

## 2019-09-23 NOTE — TELEPHONE ENCOUNTER
----- Message from Mary Pond sent at 9/23/2019  9:12 AM CDT -----  Pt has been in and out of the hospital and needs to come in. She has dialysis on M, W, F so she can come on tues, Thursday  [phone] michell sister

## 2019-09-29 ENCOUNTER — HOSPITAL ENCOUNTER (INPATIENT)
Facility: HOSPITAL | Age: 74
LOS: 1 days | Discharge: HOME | DRG: 190 | End: 2019-10-01
Attending: EMERGENCY MEDICINE | Admitting: HOSPITALIST
Payer: MEDICARE

## 2019-09-29 DIAGNOSIS — I50.43 ACUTE ON CHRONIC COMBINED SYSTOLIC AND DIASTOLIC CONGESTIVE HEART FAILURE: Primary | ICD-10-CM

## 2019-09-29 DIAGNOSIS — N18.6 END STAGE CHRONIC KIDNEY DISEASE: ICD-10-CM

## 2019-09-29 DIAGNOSIS — R06.02 SHORTNESS OF BREATH: ICD-10-CM

## 2019-09-29 DIAGNOSIS — J44.1 ACUTE EXACERBATION OF CHRONIC OBSTRUCTIVE PULMONARY DISEASE (COPD): ICD-10-CM

## 2019-09-29 PROBLEM — G93.89 ENCEPHALOMALACIA ON IMAGING STUDY: Status: RESOLVED | Noted: 2019-08-06 | Resolved: 2019-09-29

## 2019-09-29 PROBLEM — R41.82 ALTERED MENTAL STATUS: Status: RESOLVED | Noted: 2019-08-12 | Resolved: 2019-09-29

## 2019-09-29 PROBLEM — R79.89 ELEVATED TROPONIN: Status: RESOLVED | Noted: 2017-07-25 | Resolved: 2019-09-29

## 2019-09-29 PROBLEM — Z87.891 PERSONAL HISTORY OF TOBACCO USE, PRESENTING HAZARDS TO HEALTH: Status: RESOLVED | Noted: 2019-03-07 | Resolved: 2019-09-29

## 2019-09-29 PROBLEM — J43.8 OTHER EMPHYSEMA: Status: RESOLVED | Noted: 2019-03-07 | Resolved: 2019-09-29

## 2019-09-29 PROBLEM — E16.2 HYPOGLYCEMIA, UNSPECIFIED: Status: RESOLVED | Noted: 2019-08-15 | Resolved: 2019-09-29

## 2019-09-29 PROBLEM — J18.9 PNEUMONIA DUE TO INFECTIOUS ORGANISM: Status: RESOLVED | Noted: 2019-08-06 | Resolved: 2019-09-29

## 2019-09-29 PROBLEM — F41.9 ANXIETY: Status: RESOLVED | Noted: 2019-03-07 | Resolved: 2019-09-29

## 2019-09-29 PROBLEM — E83.51 HYPOCALCEMIA: Status: RESOLVED | Noted: 2017-07-18 | Resolved: 2019-09-29

## 2019-09-29 PROBLEM — R09.02 HYPOXEMIA: Status: RESOLVED | Noted: 2019-03-07 | Resolved: 2019-09-29

## 2019-09-29 LAB
ALBUMIN SERPL BCP-MCNC: 2.9 G/DL (ref 3.5–5.2)
ALP SERPL-CCNC: 164 U/L (ref 55–135)
ALT SERPL W/O P-5'-P-CCNC: 24 U/L (ref 10–44)
ANION GAP SERPL CALC-SCNC: 20 MMOL/L (ref 8–16)
AST SERPL-CCNC: 33 U/L (ref 10–40)
BASOPHILS # BLD AUTO: 0.02 K/UL (ref 0–0.2)
BASOPHILS NFR BLD: 0.3 % (ref 0–1.9)
BILIRUB SERPL-MCNC: 0.7 MG/DL (ref 0.1–1)
BNP SERPL-MCNC: >4500 PG/ML (ref 0–99)
BUN SERPL-MCNC: 34 MG/DL (ref 8–23)
CALCIUM SERPL-MCNC: 7.6 MG/DL (ref 8.7–10.5)
CHLORIDE SERPL-SCNC: 90 MMOL/L (ref 95–110)
CO2 SERPL-SCNC: 29 MMOL/L (ref 23–29)
CREAT SERPL-MCNC: 5.7 MG/DL (ref 0.5–1.4)
DIFFERENTIAL METHOD: ABNORMAL
EOSINOPHIL # BLD AUTO: 0.1 K/UL (ref 0–0.5)
EOSINOPHIL NFR BLD: 0.8 % (ref 0–8)
ERYTHROCYTE [DISTWIDTH] IN BLOOD BY AUTOMATED COUNT: 18.4 % (ref 11.5–14.5)
EST. GFR  (AFRICAN AMERICAN): 7.9 ML/MIN/1.73 M^2
EST. GFR  (NON AFRICAN AMERICAN): 6.8 ML/MIN/1.73 M^2
GLUCOSE SERPL-MCNC: 137 MG/DL (ref 70–110)
HCT VFR BLD AUTO: 28.9 % (ref 37–48.5)
HGB BLD-MCNC: 9.3 G/DL (ref 12–16)
IMM GRANULOCYTES # BLD AUTO: 0.02 K/UL (ref 0–0.04)
IMM GRANULOCYTES NFR BLD AUTO: 0.3 % (ref 0–0.5)
INR PPP: 1.1
LYMPHOCYTES # BLD AUTO: 0.9 K/UL (ref 1–4.8)
LYMPHOCYTES NFR BLD: 15.1 % (ref 18–48)
MAGNESIUM SERPL-MCNC: 2.1 MG/DL (ref 1.6–2.6)
MCH RBC QN AUTO: 28.8 PG (ref 27–31)
MCHC RBC AUTO-ENTMCNC: 32.2 G/DL (ref 32–36)
MCV RBC AUTO: 90 FL (ref 82–98)
MONOCYTES # BLD AUTO: 0.4 K/UL (ref 0.3–1)
MONOCYTES NFR BLD: 6.7 % (ref 4–15)
NEUTROPHILS # BLD AUTO: 4.6 K/UL (ref 1.8–7.7)
NEUTROPHILS NFR BLD: 76.8 % (ref 38–73)
NRBC BLD-RTO: 0 /100 WBC
PLATELET # BLD AUTO: 217 K/UL (ref 150–350)
PMV BLD AUTO: 9.8 FL (ref 9.2–12.9)
POTASSIUM SERPL-SCNC: 3.9 MMOL/L (ref 3.5–5.1)
PROT SERPL-MCNC: 7.7 G/DL (ref 6–8.4)
PROTHROMBIN TIME: 13.7 SEC (ref 11.7–14)
RBC # BLD AUTO: 3.23 M/UL (ref 4–5.4)
SODIUM SERPL-SCNC: 139 MMOL/L (ref 136–145)
TROPONIN I SERPL DL<=0.01 NG/ML-MCNC: 0.05 NG/ML (ref 0.02–0.04)
WBC # BLD AUTO: 5.96 K/UL (ref 3.9–12.7)

## 2019-09-29 PROCEDURE — 94660 CPAP INITIATION&MGMT: CPT

## 2019-09-29 PROCEDURE — 84484 ASSAY OF TROPONIN QUANT: CPT

## 2019-09-29 PROCEDURE — 93005 ELECTROCARDIOGRAM TRACING: CPT

## 2019-09-29 PROCEDURE — 25000003 PHARM REV CODE 250: Performed by: NURSE PRACTITIONER

## 2019-09-29 PROCEDURE — 84484 ASSAY OF TROPONIN QUANT: CPT | Mod: 91

## 2019-09-29 PROCEDURE — 63600175 PHARM REV CODE 636 W HCPCS: Performed by: EMERGENCY MEDICINE

## 2019-09-29 PROCEDURE — 83735 ASSAY OF MAGNESIUM: CPT

## 2019-09-29 PROCEDURE — 94640 AIRWAY INHALATION TREATMENT: CPT

## 2019-09-29 PROCEDURE — 84443 ASSAY THYROID STIM HORMONE: CPT

## 2019-09-29 PROCEDURE — 82565 ASSAY OF CREATININE: CPT

## 2019-09-29 PROCEDURE — 25000242 PHARM REV CODE 250 ALT 637 W/ HCPCS: Performed by: NURSE PRACTITIONER

## 2019-09-29 PROCEDURE — G0378 HOSPITAL OBSERVATION PER HR: HCPCS

## 2019-09-29 PROCEDURE — 99285 EMERGENCY DEPT VISIT HI MDM: CPT | Mod: 25

## 2019-09-29 PROCEDURE — 25000242 PHARM REV CODE 250 ALT 637 W/ HCPCS: Performed by: EMERGENCY MEDICINE

## 2019-09-29 PROCEDURE — 85610 PROTHROMBIN TIME: CPT

## 2019-09-29 PROCEDURE — 63600175 PHARM REV CODE 636 W HCPCS: Performed by: NURSE PRACTITIONER

## 2019-09-29 PROCEDURE — 99900035 HC TECH TIME PER 15 MIN (STAT)

## 2019-09-29 PROCEDURE — 27000221 HC OXYGEN, UP TO 24 HOURS

## 2019-09-29 PROCEDURE — 83880 ASSAY OF NATRIURETIC PEPTIDE: CPT

## 2019-09-29 PROCEDURE — 36415 COLL VENOUS BLD VENIPUNCTURE: CPT

## 2019-09-29 PROCEDURE — 85025 COMPLETE CBC W/AUTO DIFF WBC: CPT

## 2019-09-29 PROCEDURE — 84439 ASSAY OF FREE THYROXINE: CPT

## 2019-09-29 PROCEDURE — 80053 COMPREHEN METABOLIC PANEL: CPT

## 2019-09-29 RX ORDER — TRAMADOL HYDROCHLORIDE 50 MG/1
50 TABLET ORAL
COMMUNITY
End: 2019-10-03 | Stop reason: SDUPTHER

## 2019-09-29 RX ORDER — DILTIAZEM HYDROCHLORIDE 60 MG/1
60 TABLET, FILM COATED ORAL 3 TIMES DAILY
Status: ON HOLD | COMMUNITY
End: 2019-10-22 | Stop reason: HOSPADM

## 2019-09-29 RX ORDER — SEVELAMER HYDROCHLORIDE 800 MG/1
800 TABLET, FILM COATED ORAL
Status: DISCONTINUED | OUTPATIENT
Start: 2019-09-29 | End: 2019-10-01 | Stop reason: HOSPADM

## 2019-09-29 RX ORDER — LOSARTAN POTASSIUM 25 MG/1
25 TABLET ORAL DAILY
Status: DISCONTINUED | OUTPATIENT
Start: 2019-09-29 | End: 2019-10-01 | Stop reason: HOSPADM

## 2019-09-29 RX ORDER — CALCIUM ACETATE 667 MG/1
1334 CAPSULE ORAL
COMMUNITY
End: 2020-02-02

## 2019-09-29 RX ORDER — LEVALBUTEROL 1.25 MG/.5ML
1.25 SOLUTION, CONCENTRATE RESPIRATORY (INHALATION) EVERY 6 HOURS
Status: DISCONTINUED | OUTPATIENT
Start: 2019-09-29 | End: 2019-09-29

## 2019-09-29 RX ORDER — OXYCODONE AND ACETAMINOPHEN 5; 325 MG/1; MG/1
1 TABLET ORAL EVERY 6 HOURS PRN
Status: DISCONTINUED | OUTPATIENT
Start: 2019-09-29 | End: 2019-10-01 | Stop reason: HOSPADM

## 2019-09-29 RX ORDER — LANOLIN ALCOHOL/MO/W.PET/CERES
400 CREAM (GRAM) TOPICAL DAILY
Status: DISCONTINUED | OUTPATIENT
Start: 2019-09-29 | End: 2019-10-01 | Stop reason: HOSPADM

## 2019-09-29 RX ORDER — AMOXICILLIN 250 MG
1 CAPSULE ORAL 2 TIMES DAILY
Status: DISCONTINUED | OUTPATIENT
Start: 2019-09-29 | End: 2019-10-01 | Stop reason: HOSPADM

## 2019-09-29 RX ORDER — DILTIAZEM HCL/D5W 125 MG/125
5 PLASTIC BAG, INJECTION (ML) INTRAVENOUS CONTINUOUS
Status: DISCONTINUED | OUTPATIENT
Start: 2019-09-29 | End: 2019-10-01 | Stop reason: HOSPADM

## 2019-09-29 RX ORDER — LEVALBUTEROL 1.25 MG/.5ML
1.25 SOLUTION, CONCENTRATE RESPIRATORY (INHALATION) EVERY 6 HOURS
Status: DISCONTINUED | OUTPATIENT
Start: 2019-09-30 | End: 2019-09-30

## 2019-09-29 RX ORDER — NAPROXEN SODIUM 220 MG/1
81 TABLET, FILM COATED ORAL DAILY
Status: DISCONTINUED | OUTPATIENT
Start: 2019-09-29 | End: 2019-10-01 | Stop reason: HOSPADM

## 2019-09-29 RX ORDER — METOPROLOL SUCCINATE 25 MG/1
50 TABLET, EXTENDED RELEASE ORAL DAILY
Status: DISCONTINUED | OUTPATIENT
Start: 2019-09-29 | End: 2019-10-01 | Stop reason: HOSPADM

## 2019-09-29 RX ORDER — LACOSAMIDE 50 MG/1
50 TABLET ORAL DAILY
Status: DISCONTINUED | OUTPATIENT
Start: 2019-09-29 | End: 2019-09-29

## 2019-09-29 RX ORDER — IPRATROPIUM BROMIDE 0.5 MG/2.5ML
1 SOLUTION RESPIRATORY (INHALATION)
Status: COMPLETED | OUTPATIENT
Start: 2019-09-29 | End: 2019-09-29

## 2019-09-29 RX ORDER — ENOXAPARIN SODIUM 100 MG/ML
1 INJECTION SUBCUTANEOUS EVERY 24 HOURS
Status: DISCONTINUED | OUTPATIENT
Start: 2019-09-29 | End: 2019-10-01 | Stop reason: HOSPADM

## 2019-09-29 RX ORDER — ENOXAPARIN SODIUM 100 MG/ML
1 INJECTION SUBCUTANEOUS ONCE
Status: DISCONTINUED | OUTPATIENT
Start: 2019-09-29 | End: 2019-09-29 | Stop reason: SDUPTHER

## 2019-09-29 RX ORDER — METHYLPREDNISOLONE SOD SUCC 125 MG
125 VIAL (EA) INJECTION
Status: COMPLETED | OUTPATIENT
Start: 2019-09-29 | End: 2019-09-29

## 2019-09-29 RX ORDER — FLUTICASONE FUROATE AND VILANTEROL 100; 25 UG/1; UG/1
1 POWDER RESPIRATORY (INHALATION) DAILY
Status: DISCONTINUED | OUTPATIENT
Start: 2019-09-29 | End: 2019-10-01 | Stop reason: HOSPADM

## 2019-09-29 RX ORDER — LORAZEPAM 2 MG/ML
0.5 INJECTION INTRAMUSCULAR ONCE
Status: COMPLETED | OUTPATIENT
Start: 2019-09-29 | End: 2019-09-29

## 2019-09-29 RX ORDER — CALCITRIOL 0.25 UG/1
0.25 CAPSULE ORAL DAILY
Status: DISCONTINUED | OUTPATIENT
Start: 2019-09-29 | End: 2019-10-01 | Stop reason: HOSPADM

## 2019-09-29 RX ORDER — IPRATROPIUM BROMIDE 0.5 MG/2.5ML
0.5 SOLUTION RESPIRATORY (INHALATION) EVERY 6 HOURS
Status: DISCONTINUED | OUTPATIENT
Start: 2019-09-29 | End: 2019-09-30

## 2019-09-29 RX ORDER — LEVALBUTEROL 1.25 MG/.5ML
1.25 SOLUTION, CONCENTRATE RESPIRATORY (INHALATION)
Status: COMPLETED | OUTPATIENT
Start: 2019-09-29 | End: 2019-09-29

## 2019-09-29 RX ORDER — SODIUM CHLORIDE 0.9 % (FLUSH) 0.9 %
10 SYRINGE (ML) INJECTION
Status: DISCONTINUED | OUTPATIENT
Start: 2019-09-29 | End: 2019-10-01 | Stop reason: HOSPADM

## 2019-09-29 RX ORDER — DIVALPROEX SODIUM 250 MG/1
750 TABLET, FILM COATED, EXTENDED RELEASE ORAL DAILY
Status: DISCONTINUED | OUTPATIENT
Start: 2019-09-29 | End: 2019-09-29

## 2019-09-29 RX ORDER — DOXYCYCLINE 100 MG/1
100 CAPSULE ORAL EVERY 12 HOURS
Status: DISCONTINUED | OUTPATIENT
Start: 2019-09-29 | End: 2019-10-01 | Stop reason: HOSPADM

## 2019-09-29 RX ORDER — ACETAMINOPHEN 325 MG/1
650 TABLET ORAL EVERY 4 HOURS PRN
Status: DISCONTINUED | OUTPATIENT
Start: 2019-09-29 | End: 2019-10-01 | Stop reason: HOSPADM

## 2019-09-29 RX ORDER — DILTIAZEM HYDROCHLORIDE 5 MG/ML
10 INJECTION INTRAVENOUS
Status: COMPLETED | OUTPATIENT
Start: 2019-09-29 | End: 2019-09-29

## 2019-09-29 RX ORDER — LEVETIRACETAM 250 MG/1
750 TABLET ORAL DAILY
Status: DISCONTINUED | OUTPATIENT
Start: 2019-09-29 | End: 2019-09-29

## 2019-09-29 RX ORDER — ONDANSETRON 2 MG/ML
4 INJECTION INTRAMUSCULAR; INTRAVENOUS EVERY 8 HOURS PRN
Status: DISCONTINUED | OUTPATIENT
Start: 2019-09-29 | End: 2019-10-01 | Stop reason: HOSPADM

## 2019-09-29 RX ORDER — ISOSORBIDE MONONITRATE 30 MG/1
30 TABLET, EXTENDED RELEASE ORAL DAILY
Status: DISCONTINUED | OUTPATIENT
Start: 2019-09-29 | End: 2019-10-01 | Stop reason: HOSPADM

## 2019-09-29 RX ADMIN — CALCITRIOL CAPSULES 0.25 MCG 0.25 MCG: 0.25 CAPSULE ORAL at 04:09

## 2019-09-29 RX ADMIN — LEVALBUTEROL HYDROCHLORIDE 1.25 MG: 1.25 SOLUTION, CONCENTRATE RESPIRATORY (INHALATION) at 10:09

## 2019-09-29 RX ADMIN — DILTIAZEM HYDROCHLORIDE 10 MG: 5 INJECTION INTRAVENOUS at 02:09

## 2019-09-29 RX ADMIN — LEVALBUTEROL HYDROCHLORIDE 1.25 MG: 1.25 SOLUTION, CONCENTRATE RESPIRATORY (INHALATION) at 07:09

## 2019-09-29 RX ADMIN — METHYLPREDNISOLONE SODIUM SUCCINATE 125 MG: 125 INJECTION, POWDER, FOR SOLUTION INTRAMUSCULAR; INTRAVENOUS at 11:09

## 2019-09-29 RX ADMIN — METHYLPREDNISOLONE SODIUM SUCCINATE 40 MG: 40 INJECTION, POWDER, FOR SOLUTION INTRAMUSCULAR; INTRAVENOUS at 10:09

## 2019-09-29 RX ADMIN — ASPIRIN 81 MG 81 MG: 81 TABLET ORAL at 04:09

## 2019-09-29 RX ADMIN — MAGNESIUM OXIDE 400 MG: 400 TABLET ORAL at 04:09

## 2019-09-29 RX ADMIN — IPRATROPIUM BROMIDE 0.5 MG: 0.5 SOLUTION RESPIRATORY (INHALATION) at 07:09

## 2019-09-29 RX ADMIN — DILTIAZEM HYDROCHLORIDE 5 MG/HR: 5 INJECTION INTRAVENOUS at 02:09

## 2019-09-29 RX ADMIN — DOXYCYCLINE HYCLATE 100 MG: 100 CAPSULE ORAL at 08:09

## 2019-09-29 RX ADMIN — ENOXAPARIN SODIUM 50 MG: 100 INJECTION SUBCUTANEOUS at 04:09

## 2019-09-29 RX ADMIN — IPRATROPIUM BROMIDE 1 MG: 0.5 SOLUTION RESPIRATORY (INHALATION) at 10:09

## 2019-09-29 RX ADMIN — LORAZEPAM 0.5 MG: 2 INJECTION INTRAMUSCULAR; INTRAVENOUS at 04:09

## 2019-09-29 NOTE — ASSESSMENT & PLAN NOTE
Admit to cardiology A   BiPAP PRN for comfort   Nebs q6h  Steroids IV for 24 hours   Doxycycline

## 2019-09-29 NOTE — HPI
Ms. Neely presents today with complaints of SOB. It is severe. It is associated with cough, but feels unable to cough anything up, and increased wheezing. She denies fever, chills, N/V/D, dizziness, or LOC. She reports symptoms began on Friday and have gotten progressively worse. She has a history of ESRD on HD MWF with calciphylaxis, afib not on anticoagulation, COPD, and recurrent encephalopathy. She was recently admitted earlier this month for pneumonia, but has completed her antibiotics. She is currently AAOx4. She continues to smoke cigarettes. She is off of they BiPAP at the time of exam, but began feeling SOB, her pulse ox remained at 97% throughout this, but RT was called and BiPAP re applied. She expresses her wishes to be a DNR.

## 2019-09-29 NOTE — ASSESSMENT & PLAN NOTE
CXR appears stable   BNP >4000   On HD, will dialyzed in AM   Suspect dyspnea is more from COPD exac

## 2019-09-29 NOTE — ED NOTES
Pt refused another attempt at ultrasound IV. Informed primary nurse that I was able to obtain bloodwork, but no IV access.

## 2019-09-29 NOTE — SUBJECTIVE & OBJECTIVE
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    both legs    CHF (congestive heart failure)     Encounter for blood transfusion 03/2016    Gout     Hypertension     Mitral valve regurgitation     Osteoarthritis     Pancreatitis     Peripheral vascular disease     Peritoneal dialysis catheter in place     Pneumonia 09/09/2017    Renal failure        Past Surgical History:   Procedure Laterality Date    ACHILLES TENDON SURGERY Right     APPENDECTOMY      CARDIAC CATHETERIZATION  07/03/2017    CHOLECYSTECTOMY      heal surgery Right     HYSTERECTOMY      PARATHYROIDECTOMY      PARATHYROIDECTOMY  07/13/2017    PERITONEAL CATHETER INSERTION      RENAL BIOPSY      vocal cord nodule         Review of patient's allergies indicates:  No Known Allergies    No current facility-administered medications on file prior to encounter.      Current Outpatient Medications on File Prior to Encounter   Medication Sig    aspirin 81 MG Chew Take 81 mg by mouth once daily.     diltiaZEM (CARDIZEM) 60 MG tablet Take 60 mg by mouth 3 (three) times daily.    fluticasone furoate-vilanterol (BREO) 100-25 mcg/dose diskus inhaler Inhale 1 puff into the lungs once daily. Controller    isosorbide mononitrate (IMDUR) 30 MG 24 hr tablet Take 30 mg by mouth once daily.    losartan (COZAAR) 25 MG tablet Take 25 mg by mouth once daily.    magnesium oxide (MAG-OX) 400 mg (241.3 mg magnesium) tablet Take 400 mg by mouth once daily.    metoprolol succinate (TOPROL-XL) 50 MG 24 hr tablet Take 1 tablet (50 mg total) by mouth once daily. (Patient taking differently: Take 50 mg by mouth once daily. )    oxyCODONE-acetaminophen (PERCOCET) 5-325 mg per tablet Take 1 tablet by mouth daily as needed for Pain (with dialysis).     traMADol (ULTRAM) 50 mg tablet Take 50 mg by mouth 3 (three) times a week. 3 TIMES A WEEK AS NEEDED DURING HEMODIALYSIS    calcium acetate (PHOSLO) 667 mg capsule Take 1,334  mg by mouth 3 (three) times daily with meals.    ondansetron (ZOFRAN) 4 MG tablet Take 4 mg by mouth every 6 (six) hours as needed for Nausea.    [DISCONTINUED] calcitRIOL (ROCALTROL) 0.25 MCG Cap Take 0.25 mcg by mouth once daily.    [DISCONTINUED] diltiaZEM (CARDIZEM CD) 180 MG 24 hr capsule Take 1 capsule (180 mg total) by mouth once daily.    [DISCONTINUED] divalproex ER (DEPAKOTE ER) 250 MG 24 hr tablet Take 750 mg by mouth once daily.    [DISCONTINUED] lacosamide (VIMPAT) 50 mg Tab Take 1 tablet (50 mg total) by mouth once daily.    [DISCONTINUED] levETIRAcetam (KEPPRA) 500 MG Tab Take 750 mg by mouth once daily.     [DISCONTINUED] sevelamer carbonate (RENVELA) 800 mg Tab Take 800 mg by mouth 3 (three) times daily with meals.     Family History     Problem Relation (Age of Onset)    Arthritis Mother    Diabetes Mother, Father    Early death Sister, Sister    Heart disease Sister, Maternal Grandfather, Mother    Heart failure Mother    Hypertension Mother        Tobacco Use    Smoking status: Current Some Day Smoker     Packs/day: 0.50     Years: 45.00     Pack years: 22.50    Smokeless tobacco: Never Used   Substance and Sexual Activity    Alcohol use: No    Drug use: No    Sexual activity: Not on file     Review of Systems   Constitutional: Positive for fatigue. Negative for activity change, appetite change, chills, diaphoresis, fever and unexpected weight change.   HENT: Negative for congestion, ear pain, facial swelling, hearing loss, sore throat and trouble swallowing.    Eyes: Negative for pain and discharge.   Respiratory: Positive for cough, shortness of breath and wheezing. Negative for chest tightness.    Cardiovascular: Negative for chest pain, palpitations and leg swelling.   Gastrointestinal: Negative for abdominal pain, blood in stool, diarrhea, nausea and vomiting.   Endocrine: Negative for polydipsia, polyphagia and polyuria.   Genitourinary: Negative for difficulty urinating,  dysuria, flank pain, frequency and urgency.   Musculoskeletal: Negative for arthralgias, back pain, joint swelling, neck pain and neck stiffness.   Skin: Positive for wound. Negative for rash.        Healing wound to left upper leg   Allergic/Immunologic: Negative for environmental allergies and immunocompromised state.   Neurological: Positive for weakness. Negative for dizziness, seizures, syncope, speech difficulty, light-headedness, numbness and headaches.   Hematological: Negative for adenopathy.   Psychiatric/Behavioral: Negative for sleep disturbance and suicidal ideas. The patient is not nervous/anxious.    All other systems reviewed and are negative.    Objective:     Vital Signs (Most Recent):  Temp: 97.8 °F (36.6 °C) (09/29/19 1012)  Pulse: (!) 135 (09/29/19 1400)  Resp: (!) 29 (09/29/19 1400)  BP: (!) 164/123 (09/29/19 1400)  SpO2: 98 % (09/29/19 1400) Vital Signs (24h Range):  Temp:  [97.8 °F (36.6 °C)] 97.8 °F (36.6 °C)  Pulse:  [109-135] 135  Resp:  [14-29] 29  SpO2:  [94 %-100 %] 98 %  BP: (147-190)/() 164/123     Weight: 54.4 kg (120 lb)  Body mass index is 19.97 kg/m².    Physical Exam   Constitutional: She is oriented to person, place, and time. She appears well-developed and well-nourished.   HENT:   Head: Normocephalic and atraumatic.   Eyes: Pupils are equal, round, and reactive to light. EOM are normal.   Neck: Normal range of motion. Neck supple.   Cardiovascular: Intact distal pulses.   Murmur heard.  Irregular rate    Pulmonary/Chest: No stridor. No respiratory distress. She has wheezes.   tachypnea   Abdominal: Soft. Bowel sounds are normal. She exhibits no distension. There is no tenderness.   Musculoskeletal: Normal range of motion.   Neurological: She is alert and oriented to person, place, and time.   Skin: Skin is warm and dry. Capillary refill takes less than 2 seconds.   Left upper inner thigh healing wound, no erythema or purulent drainage   Psychiatric: She has a normal mood  and affect.   Nursing note and vitals reviewed.        CRANIAL NERVES     CN III, IV, VI   Pupils are equal, round, and reactive to light.  Extraocular motions are normal.        Significant Labs:  Labs are on paper chart  K 3.9     Significant Imaging: I have reviewed all pertinent imaging results/findings within the past 24 hours.   X-ray Chest Ap Portable    Result Date: 9/29/2019  EXAMINATION: XR CHEST AP PORTABLE CLINICAL HISTORY: CHF; TECHNIQUE: Single frontal view of the chest was performed. COMPARISON: 09/10/2019 FINDINGS: Single AP view of the chest was performed with previous comparison study 09/10/2019.  Stable cardiomediastinal enlargement with biventricular dominance and postop changes of previous aortic valve repair. Increased central vascular prominence noted with cephalization of flow to the upper lobes bilaterally secondary mild vascular congestion changes with no reduction in the interval.  Stable bilateral right and left pleural effusion is more prominent on the right side.  There is no evidence of pneumothorax formation.  Atheromatous changes about the aortic knob.  Tracheal lumen appears midline.  There is no evidence of significant skeletal abnormality.     1. Stable cardiomegaly. 2. Postop changes of previous aortic valve repair. 3. Stable interstitial pulmonary edema with no reduction in the interval. Electronically signed by: Alonso Garcia MD Date:    09/29/2019 Time:    10:45    EKG:   Atrial fibrillation with rapid ventricular response  Nonspecific ST and T wave abnormality  Abnormal ECG  When compared with ECG of 07-SEP-2019 15:46,  No significant change was found

## 2019-09-29 NOTE — ASSESSMENT & PLAN NOTE
Consult Dr. Ingram for HD management for MWF  Pt reports she stopped taking her phosphorus binders, will resume

## 2019-09-29 NOTE — H&P
"[facility] Medicine  History & Physical    DOS: 09/29/2019  1:43 PM      Patient Name: Griselda Neely  MRN: [id number]  Admission Date: 9/29/2019  Attending Physician: Dr. Lopez  Primary Care Provider: Kalie Neely MD         Patient information was obtained from patient, caregiver / friend and ER records.     Subjective:     Principal Problem:Acute exacerbation of chronic obstructive pulmonary disease (COPD)    Chief Complaint:   Chief Complaint   Patient presents with    Shortness of Breath     shortness of breath with pain in chest that started Friday. Pt "feels like I need to break up this congestion"         HPI: Ms. Neely presents today with complaints of SOB. It is severe. It is associated with cough, but feels unable to cough anything up, and increased wheezing. She denies fever, chills, N/V/D, dizziness, or LOC. She reports symptoms began on Friday and have gotten progressively worse. She has a history of ESRD on HD MWF with calciphylaxis, afib not on anticoagulation, COPD, and recurrent encephalopathy. She was recently admitted earlier this month for pneumonia, but has completed her antibiotics. She is currently AAOx4. She continues to smoke cigarettes. She is off of they BiPAP at the time of exam, but began feeling SOB, her pulse ox remained at 97% throughout this, but RT was called and BiPAP re applied. She expresses her wishes to be a DNR.    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    both legs    CHF (congestive heart failure)     Encounter for blood transfusion 03/2016    Gout     Hypertension     Mitral valve regurgitation     Osteoarthritis     Pancreatitis     Peripheral vascular disease     Peritoneal dialysis catheter in place     Pneumonia 09/09/2017    Renal failure        Past Surgical History:   Procedure Laterality Date    ACHILLES TENDON SURGERY Right     APPENDECTOMY      CARDIAC CATHETERIZATION  " 07/03/2017    CHOLECYSTECTOMY      heal surgery Right     HYSTERECTOMY      PARATHYROIDECTOMY      PARATHYROIDECTOMY  07/13/2017    PERITONEAL CATHETER INSERTION      RENAL BIOPSY      vocal cord nodule         Review of patient's allergies indicates:  No Known Allergies    No current facility-administered medications on file prior to encounter.      Current Outpatient Medications on File Prior to Encounter   Medication Sig    aspirin 81 MG Chew Take 81 mg by mouth once daily.     diltiaZEM (CARDIZEM) 60 MG tablet Take 60 mg by mouth 3 (three) times daily.    fluticasone furoate-vilanterol (BREO) 100-25 mcg/dose diskus inhaler Inhale 1 puff into the lungs once daily. Controller    isosorbide mononitrate (IMDUR) 30 MG 24 hr tablet Take 30 mg by mouth once daily.    losartan (COZAAR) 25 MG tablet Take 25 mg by mouth once daily.    magnesium oxide (MAG-OX) 400 mg (241.3 mg magnesium) tablet Take 400 mg by mouth once daily.    metoprolol succinate (TOPROL-XL) 50 MG 24 hr tablet Take 1 tablet (50 mg total) by mouth once daily. (Patient taking differently: Take 50 mg by mouth once daily. )    oxyCODONE-acetaminophen (PERCOCET) 5-325 mg per tablet Take 1 tablet by mouth daily as needed for Pain (with dialysis).     traMADol (ULTRAM) 50 mg tablet Take 50 mg by mouth 3 (three) times a week. 3 TIMES A WEEK AS NEEDED DURING HEMODIALYSIS    calcium acetate (PHOSLO) 667 mg capsule Take 1,334 mg by mouth 3 (three) times daily with meals.    ondansetron (ZOFRAN) 4 MG tablet Take 4 mg by mouth every 6 (six) hours as needed for Nausea.    [DISCONTINUED] calcitRIOL (ROCALTROL) 0.25 MCG Cap Take 0.25 mcg by mouth once daily.    [DISCONTINUED] diltiaZEM (CARDIZEM CD) 180 MG 24 hr capsule Take 1 capsule (180 mg total) by mouth once daily.    [DISCONTINUED] divalproex ER (DEPAKOTE ER) 250 MG 24 hr tablet Take 750 mg by mouth once daily.    [DISCONTINUED] lacosamide (VIMPAT) 50 mg Tab Take 1 tablet (50 mg total) by  mouth once daily.    [DISCONTINUED] levETIRAcetam (KEPPRA) 500 MG Tab Take 750 mg by mouth once daily.     [DISCONTINUED] sevelamer carbonate (RENVELA) 800 mg Tab Take 800 mg by mouth 3 (three) times daily with meals.     Family History     Problem Relation (Age of Onset)    Arthritis Mother    Diabetes Mother, Father    Early death Sister, Sister    Heart disease Sister, Maternal Grandfather, Mother    Heart failure Mother    Hypertension Mother        Tobacco Use    Smoking status: Current Some Day Smoker     Packs/day: 0.50     Years: 45.00     Pack years: 22.50    Smokeless tobacco: Never Used   Substance and Sexual Activity    Alcohol use: No    Drug use: No    Sexual activity: Not on file     Review of Systems   Constitutional: Positive for fatigue. Negative for activity change, appetite change, chills, diaphoresis, fever and unexpected weight change.   HENT: Negative for congestion, ear pain, facial swelling, hearing loss, sore throat and trouble swallowing.    Eyes: Negative for pain and discharge.   Respiratory: Positive for cough, shortness of breath and wheezing. Negative for chest tightness.    Cardiovascular: Negative for chest pain, palpitations and leg swelling.   Gastrointestinal: Negative for abdominal pain, blood in stool, diarrhea, nausea and vomiting.   Endocrine: Negative for polydipsia, polyphagia and polyuria.   Genitourinary: Negative for difficulty urinating, dysuria, flank pain, frequency and urgency.   Musculoskeletal: Negative for arthralgias, back pain, joint swelling, neck pain and neck stiffness.   Skin: Positive for wound. Negative for rash.        Healing wound to left upper leg   Allergic/Immunologic: Negative for environmental allergies and immunocompromised state.   Neurological: Positive for weakness. Negative for dizziness, seizures, syncope, speech difficulty, light-headedness, numbness and headaches.   Hematological: Negative for adenopathy.   Psychiatric/Behavioral:  Negative for sleep disturbance and suicidal ideas. The patient is not nervous/anxious.    All other systems reviewed and are negative.    Objective:     Vital Signs (Most Recent):  Temp: 97.8 °F (36.6 °C) (09/29/19 1012)  Pulse: (!) 135 (09/29/19 1400)  Resp: (!) 29 (09/29/19 1400)  BP: (!) 164/123 (09/29/19 1400)  SpO2: 98 % (09/29/19 1400) Vital Signs (24h Range):  Temp:  [97.8 °F (36.6 °C)] 97.8 °F (36.6 °C)  Pulse:  [109-135] 135  Resp:  [14-29] 29  SpO2:  [94 %-100 %] 98 %  BP: (147-190)/() 164/123     Weight: 54.4 kg (120 lb)  Body mass index is 19.97 kg/m².    Physical Exam   Constitutional: She is oriented to person, place, and time. She appears well-developed and well-nourished.   HENT:   Head: Normocephalic and atraumatic.   Eyes: Pupils are equal, round, and reactive to light. EOM are normal.   Neck: Normal range of motion. Neck supple.   Cardiovascular: Intact distal pulses.   Murmur heard.  Irregular rate    Pulmonary/Chest: No stridor. No respiratory distress. She has wheezes.   tachypnea   Abdominal: Soft. Bowel sounds are normal. She exhibits no distension. There is no tenderness.   Musculoskeletal: Normal range of motion.   Neurological: She is alert and oriented to person, place, and time.   Skin: Skin is warm and dry. Capillary refill takes less than 2 seconds.   Left upper inner thigh healing wound, no erythema or purulent drainage   Psychiatric: She has a normal mood and affect.   Nursing note and vitals reviewed.        CRANIAL NERVES     CN III, IV, VI   Pupils are equal, round, and reactive to light.  Extraocular motions are normal.        Significant Labs:  Labs are on paper chart  K 3.9     Significant Imaging: I have reviewed all pertinent imaging results/findings within the past 24 hours.   X-ray Chest Ap Portable    Result Date: 9/29/2019  EXAMINATION: XR CHEST AP PORTABLE CLINICAL HISTORY: CHF; TECHNIQUE: Single frontal view of the chest was performed. COMPARISON: 09/10/2019  FINDINGS: Single AP view of the chest was performed with previous comparison study 09/10/2019.  Stable cardiomediastinal enlargement with biventricular dominance and postop changes of previous aortic valve repair. Increased central vascular prominence noted with cephalization of flow to the upper lobes bilaterally secondary mild vascular congestion changes with no reduction in the interval.  Stable bilateral right and left pleural effusion is more prominent on the right side.  There is no evidence of pneumothorax formation.  Atheromatous changes about the aortic knob.  Tracheal lumen appears midline.  There is no evidence of significant skeletal abnormality.     1. Stable cardiomegaly. 2. Postop changes of previous aortic valve repair. 3. Stable interstitial pulmonary edema with no reduction in the interval. Electronically signed by: Alonso Garcia MD Date:    09/29/2019 Time:    10:45    EKG:   Atrial fibrillation with rapid ventricular response  Nonspecific ST and T wave abnormality  Abnormal ECG  When compared with ECG of 07-SEP-2019 15:46,  No significant change was found    Assessment/Plan:     * Acute exacerbation of chronic obstructive pulmonary disease (COPD)  Admit to cardiology A   BiPAP PRN for comfort   Nebs q6h  Steroids IV for 24 hours   Doxycycline       Acute on chronic combined systolic and diastolic congestive heart failure  CXR appears stable   BNP >4000   On HD, will dialyzed in AM   Suspect dyspnea is more from COPD exac      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 Ingram for HD management for MWF  Pt reports she stopped taking her phosphorus binders, will resume      HTN (hypertension)  Continue home meds        VTE Risk Mitigation (From  admission, onward)         Ordered     enoxaparin injection 50 mg  Daily      09/29/19 1402     IP VTE HIGH RISK PATIENT  Once      09/29/19 1402                   Maggie Carpio NP  Department of Hospital Medicine   [facility]

## 2019-09-29 NOTE — CONSULTS
Pending sale to [facility]  Cardiology  Consult Note    Patient Name: Griselda Neely  MRN: [id number]  Admission Date: 9/29/2019  Hospital Length of Stay: 0 days  Code Status: DNR   Attending Provider: Cristian Lopez MD   Consulting Provider: Katharina Powell MD  Primary Care Physician: Kalie Neely MD  Principal Problem:Acute exacerbation of chronic obstructive pulmonary disease (COPD)    Patient information was obtained from patient, past medical records and ER records.     Inpatient consult to Cardiology  Consult performed by: Katharina Powell MD  Consult ordered by: Maggie Carpio NP        Subjective:     REASON FOR CONSULT:   Atrial fibrillation    HPI: 75 y/o female with a PMH significant for MV replacement, hypertension, ESRD, calciphylaxis presented to the hospital with increased shortness of breath. History was obtained from family member as patient is currently confused and no meaningful history could be obtained. She reportedly received ativan and subsequently had altered mental status. No h/o of any chest pain. No lower extremity edema.     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    both legs    CHF (congestive heart failure)     Encounter for blood transfusion 03/2016    Gout     Hypertension     Mitral valve regurgitation     Osteoarthritis     Pancreatitis     Peripheral vascular disease     Peritoneal dialysis catheter in place     Pneumonia 09/09/2017    Renal failure        Past Surgical History:   Procedure Laterality Date    ACHILLES TENDON SURGERY Right     APPENDECTOMY      CARDIAC CATHETERIZATION  07/03/2017    CHOLECYSTECTOMY      heal surgery Right     HYSTERECTOMY      PARATHYROIDECTOMY      PARATHYROIDECTOMY  07/13/2017    PERITONEAL CATHETER INSERTION      RENAL BIOPSY      vocal cord nodule         Review of patient's allergies indicates:  No Known Allergies    No current facility-administered medications  on file prior to encounter.      Current Outpatient Medications on File Prior to Encounter   Medication Sig    aspirin 81 MG Chew Take 81 mg by mouth once daily.     diltiaZEM (CARDIZEM) 60 MG tablet Take 60 mg by mouth 3 (three) times daily.    fluticasone furoate-vilanterol (BREO) 100-25 mcg/dose diskus inhaler Inhale 1 puff into the lungs once daily. Controller    isosorbide mononitrate (IMDUR) 30 MG 24 hr tablet Take 30 mg by mouth once daily.    losartan (COZAAR) 25 MG tablet Take 25 mg by mouth once daily.    magnesium oxide (MAG-OX) 400 mg (241.3 mg magnesium) tablet Take 400 mg by mouth once daily.    metoprolol succinate (TOPROL-XL) 50 MG 24 hr tablet Take 1 tablet (50 mg total) by mouth once daily. (Patient taking differently: Take 50 mg by mouth once daily. )    oxyCODONE-acetaminophen (PERCOCET) 5-325 mg per tablet Take 1 tablet by mouth daily as needed for Pain (with dialysis).     traMADol (ULTRAM) 50 mg tablet Take 50 mg by mouth 3 (three) times a week. 3 TIMES A WEEK AS NEEDED DURING HEMODIALYSIS    calcium acetate (PHOSLO) 667 mg capsule Take 1,334 mg by mouth 3 (three) times daily with meals.    ondansetron (ZOFRAN) 4 MG tablet Take 4 mg by mouth every 6 (six) hours as needed for Nausea.    [DISCONTINUED] calcitRIOL (ROCALTROL) 0.25 MCG Cap Take 0.25 mcg by mouth once daily.    [DISCONTINUED] diltiaZEM (CARDIZEM CD) 180 MG 24 hr capsule Take 1 capsule (180 mg total) by mouth once daily.    [DISCONTINUED] divalproex ER (DEPAKOTE ER) 250 MG 24 hr tablet Take 750 mg by mouth once daily.    [DISCONTINUED] lacosamide (VIMPAT) 50 mg Tab Take 1 tablet (50 mg total) by mouth once daily.    [DISCONTINUED] levETIRAcetam (KEPPRA) 500 MG Tab Take 750 mg by mouth once daily.     [DISCONTINUED] sevelamer carbonate (RENVELA) 800 mg Tab Take 800 mg by mouth 3 (three) times daily with meals.       Scheduled Meds:   aspirin  81 mg Oral Daily    calcitRIOL  0.25 mcg Oral Daily    enoxaparin  1  mg/kg Subcutaneous Daily    enoxaparin  1 mg/kg Subcutaneous Once    fluticasone furoate-vilanterol  1 puff Inhalation Daily    ipratropium  0.5 mg Nebulization Q6H    isosorbide mononitrate  30 mg Oral Daily    levalbuterol  1.25 mg Nebulization Q6H    losartan  25 mg Oral Daily    magnesium oxide  400 mg Oral Daily    methylPREDNISolone sodium succinate  40 mg Intravenous Q8H    metoprolol succinate  50 mg Oral Daily    senna-docusate 8.6-50 mg  1 tablet Oral BID    sevelamer HCl  800 mg Oral TID WM     Continuous Infusions:   dilTIAZem 5 mg/hr (09/29/19 1433)     PRN Meds:.acetaminophen, ondansetron, oxyCODONE-acetaminophen, sodium chloride 0.9%    Family History     Problem Relation (Age of Onset)    Arthritis Mother    Diabetes Mother, Father    Early death Sister, Sister    Heart disease Sister, Maternal Grandfather, Mother    Heart failure Mother    Hypertension Mother          Tobacco Use    Smoking status: Current Some Day Smoker     Packs/day: 0.50     Years: 45.00     Pack years: 22.50    Smokeless tobacco: Never Used   Substance and Sexual Activity    Alcohol use: No    Drug use: No    Sexual activity: Not on file       ROS  Objective:     Vital Signs (Most Recent):  Temp: 98 °F (36.7 °C) (09/29/19 1531)  Pulse: (!) 123 (09/29/19 1457)  Resp: (!) 25 (09/29/19 1457)  BP: 104/62 (09/29/19 1531)  SpO2: 97 % (09/29/19 1457) Vital Signs (24h Range):  Temp:  [97.8 °F (36.6 °C)-98 °F (36.7 °C)] 98 °F (36.7 °C)  Pulse:  [109-135] 123  Resp:  [14-29] 25  SpO2:  [94 %-100 %] 97 %  BP: (104-190)/() 104/62     Weight: 59 kg (130 lb 1.1 oz)  Body mass index is 21.64 kg/m².    SpO2: 97 %  O2 Device (Oxygen Therapy): BiPAP(just received pt from ER)    No intake or output data in the 24 hours ending 09/29/19 1808    Lines/Drains/Airways     Drain                 Hemodialysis AV Fistula 08/08/19 0214 Right upper arm 52 days          Peripheral Intravenous Line                 Peripheral IV - Single  Lumen -- days         Peripheral IV - Single Lumen 09/29/19 1127 20 G;1 3/4 in Left Upper Arm less than 1 day                Physical Exam    Significant Labs: ABG: No results for input(s): PH, PCO2, HCO3, POCSATURATED, BE in the last 48 hours., BMP: No results for input(s): GLU, NA, K, CL, CO2, BUN, CREATININE, CALCIUM, MG in the last 48 hours., CMP No results for input(s): NA, K, CL, CO2, GLU, BUN, CREATININE, CALCIUM, PROT, ALBUMIN, BILITOT, ALKPHOS, AST, ALT, ANIONGAP, ESTGFRAFRICA, EGFRNONAA in the last 48 hours., CBC No results for input(s): WBC, HGB, HCT, PLT in the last 48 hours., INR No results for input(s): INR, PROTIME in the last 48 hours., Lipid Panel No results for input(s): CHOL, HDL, LDLCALC, TRIG, CHOLHDL in the last 48 hours. and Troponin No results for input(s): TROPONINI in the last 48 hours.    Significant Imaging: Reviewed.     Echo (7/11/19):  Overall LVEF is 40-45% by visual estimate .  Atrial fibrillation interferes with interpretation of LVEF.  Moderately dilated left atrium.  Small pericardial effusion.  Mitral valve bioprosthesis in stable position. Leaflets are not well visualized. Mild mitral regurgitation. No significant stenosis  by Doppler.  Mild tricuspid, aortic and pulmonary regurgitation.  PA pressure ~ 52 mm Hg.  Assessment and Plan:     IMPRESSION:  1. Altered mental status. Secondary to ativan.   2. Congestive heart failure. Chronic. LVEF ~40-45% on last echo 7/2019. Euvolemic on exam.   3. COPD exacerbation.   4. Calciphlyaxis.Reportedly better.   5. Atrial fibrillation. Valvular. Persistent. Patient does not wish to be on any oral anticoagulation or to be cardioverted. Currently with RVR. On cardizem GTT.   6. Hypertension. Better controlled at present. Elevates with agitation. Periods of hypotension. Seems better.   7. s/p MVR via right thoracotomy approach on 3/18/19 with Dr. Lora. Functioning appropriately per last echo.   8. CAD. Nonobstructive on C done on  3/13/2019.   9. End-stage renal disease on hemodialysis MWF. Anuric. Followed by Dr. Ingram.   10. Chronic tobacco abuse.    RECOMMENDATIONS:  1. Continue cardizem GTT.   2. Continue current regimen otherwise.     Thank you for your consult. I will follow-up with patient. Please contact us if you have any additional questions.    Katharina Powell MD  Cardiology   [facility]

## 2019-09-29 NOTE — ED PROVIDER NOTES
"Encounter Date: 9/29/2019       History     Chief Complaint   Patient presents with    Shortness of Breath     shortness of breath with pain in chest that started Friday. Pt "feels like I need to break up this congestion"      Patient here with reported shortness of breath cough congested feeling states feels as though" there is stuff caught up in my chest that  has to be broken up " denies any chest pain no lower extremity edema she has a history of heart failure she has a history of end-stage renal disease and is currently on hemodialysis her last dialysis was Friday she was able to sit for the entire session states that she has been having difficulty with cough and shortness of breath for approximately 1 week no fever chills        Review of patient's allergies indicates:  No Known Allergies  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    both legs    CHF (congestive heart failure)     Encounter for blood transfusion 03/2016    Gout     Hypertension     Mitral valve regurgitation     Osteoarthritis     Pancreatitis     Peripheral vascular disease     Peritoneal dialysis catheter in place     Pneumonia 09/09/2017    Renal failure      Past Surgical History:   Procedure Laterality Date    ACHILLES TENDON SURGERY Right     APPENDECTOMY      CARDIAC CATHETERIZATION  07/03/2017    CHOLECYSTECTOMY      heal surgery Right     HYSTERECTOMY      PARATHYROIDECTOMY      PARATHYROIDECTOMY  07/13/2017    PERITONEAL CATHETER INSERTION      RENAL BIOPSY      vocal cord nodule       Family History   Problem Relation Age of Onset    Heart disease Sister     Early death Sister         heart as baby    Heart disease Maternal Grandfather     Diabetes Mother     Hypertension Mother     Heart failure Mother     Heart disease Mother     Arthritis Mother     Diabetes Father     Early death Sister         infant     Social History     Tobacco Use    Smoking status: " Current Some Day Smoker     Packs/day: 0.50     Years: 45.00     Pack years: 22.50    Smokeless tobacco: Never Used   Substance Use Topics    Alcohol use: No    Drug use: No     Review of Systems   Constitutional: Positive for fatigue. Negative for appetite change, chills and fever.   HENT: Negative.    Eyes: Negative.    Respiratory: Positive for cough, chest tightness, shortness of breath and wheezing.    Cardiovascular: Positive for palpitations. Negative for chest pain and leg swelling.   Gastrointestinal: Negative.    Endocrine: Negative.    Genitourinary: Negative.    Musculoskeletal: Negative.    Skin: Negative.    Neurological: Negative.    Hematological: Negative.    Psychiatric/Behavioral: Negative.        Physical Exam     Initial Vitals [09/29/19 1012]   BP Pulse Resp Temp SpO2   (!) 171/95 (!) 116 (!) 28 97.8 °F (36.6 °C) (!) 94 %      MAP       --         Physical Exam    Constitutional: She appears well-developed and well-nourished. No distress.   HENT:   Head: Normocephalic and atraumatic.   Right Ear: External ear normal.   Left Ear: External ear normal.   Mouth/Throat: Oropharynx is clear and moist.   Eyes: EOM are normal. Pupils are equal, round, and reactive to light.   Neck: Normal range of motion. Neck supple.   Cardiovascular: Normal heart sounds and intact distal pulses. An irregularly irregular rhythm present.  Tachycardia present.    Pulmonary/Chest: She is in respiratory distress. She has wheezes. She has no rales.   Abdominal: Soft. Bowel sounds are normal. She exhibits no distension. There is no tenderness.   Musculoskeletal: Normal range of motion. She exhibits no edema.   Neurological: She is alert and oriented to person, place, and time. She has normal strength. GCS score is 15. GCS eye subscore is 4. GCS verbal subscore is 5. GCS motor subscore is 6.   Skin: Skin is warm and dry. Capillary refill takes less than 2 seconds.   Psychiatric: She has a normal mood and affect. Her  behavior is normal.         ED Course   Procedures  Labs Reviewed   CBC W/ AUTO DIFFERENTIAL   COMPREHENSIVE METABOLIC PANEL   TROPONIN I   B-TYPE NATRIURETIC PEPTIDE   PROTIME-INR   MAGNESIUM        ECG Results          EKG 12-lead (In process)  Result time 09/29/19 11:48:00    In process by Interface, Lab In [facility] (09/29/19 11:48:00)                 Narrative:    Test Reason : R06.02,    Vent. Rate : 120 BPM     Atrial Rate : 159 BPM     P-R Int : 000 ms          QRS Dur : 090 ms      QT Int : 366 ms       P-R-T Axes : 000 -21 060 degrees     QTc Int : 517 ms    Atrial fibrillation with rapid ventricular response  Nonspecific ST and T wave abnormality  Abnormal ECG  When compared with ECG of 07-SEP-2019 15:46,  No significant change was found    Referred By: [email or web address]   SELF           Confirmed By:                             Imaging Results          X-Ray Chest AP Portable (Final result)  Result time 09/29/19 10:45:16    Final result by Alonso Garcia Jr., MD (09/29/19 10:45:16)                 Impression:      1. Stable cardiomegaly.  2. Postop changes of previous aortic valve repair.  3. Stable interstitial pulmonary edema with no reduction in the interval.      Electronically signed by: Alonso Garcia MD  Date:    09/29/2019  Time:    10:45             Narrative:    EXAMINATION:  XR CHEST AP PORTABLE    CLINICAL HISTORY:  CHF;    TECHNIQUE:  Single frontal view of the chest was performed.    COMPARISON:  09/10/2019    FINDINGS:  Single AP view of the chest was performed with previous comparison study 09/10/2019.  Stable cardiomediastinal enlargement with biventricular dominance and postop changes of previous aortic valve repair.    Increased central vascular prominence noted with cephalization of flow to the upper lobes bilaterally secondary mild vascular congestion changes with no reduction in the interval.  Stable bilateral right and left pleural effusion is more prominent on the right side.  There is  no evidence of pneumothorax formation.  Atheromatous changes about the aortic knob.  Tracheal lumen appears midline.  There is no evidence of significant skeletal abnormality.                                 Medical Decision Making:   ED Management:  Right I discussed the case with Dr. Gupta her nephrologist also consult to the hospitalist placement is on Cardizem drip this time to see if nebulized therapy will admit for rate control and diuresis                      Clinical Impression:       ICD-10-CM ICD-9-CM   1. Acute on chronic combined systolic and diastolic congestive heart failure I50.43 428.43     428.0   2. Shortness of breath R06.02 786.05   3. End stage chronic kidney disease N18.6 585.6     V45.11                                Gilbert Gutierrez MD  09/29/19 4066

## 2019-09-30 PROBLEM — Z66 DNR (DO NOT RESUSCITATE): Chronic | Status: ACTIVE | Noted: 2019-09-30

## 2019-09-30 LAB
ANION GAP SERPL CALC-SCNC: 22 MMOL/L (ref 8–16)
BASOPHILS # BLD AUTO: 0.01 K/UL (ref 0–0.2)
BASOPHILS NFR BLD: 0.1 % (ref 0–1.9)
BUN SERPL-MCNC: 45 MG/DL (ref 8–23)
CALCIUM SERPL-MCNC: 7.3 MG/DL (ref 8.7–10.5)
CHLORIDE SERPL-SCNC: 89 MMOL/L (ref 95–110)
CO2 SERPL-SCNC: 26 MMOL/L (ref 23–29)
CREAT SERPL-MCNC: 6.4 MG/DL (ref 0.5–1.4)
CREAT SERPL-MCNC: 6.6 MG/DL (ref 0.5–1.4)
DIFFERENTIAL METHOD: ABNORMAL
EOSINOPHIL # BLD AUTO: 0 K/UL (ref 0–0.5)
EOSINOPHIL NFR BLD: 0 % (ref 0–8)
ERYTHROCYTE [DISTWIDTH] IN BLOOD BY AUTOMATED COUNT: 18.1 % (ref 11.5–14.5)
EST. GFR  (AFRICAN AMERICAN): 6.6 ML/MIN/1.73 M^2
EST. GFR  (AFRICAN AMERICAN): 6.8 ML/MIN/1.73 M^2
EST. GFR  (NON AFRICAN AMERICAN): 5.7 ML/MIN/1.73 M^2
EST. GFR  (NON AFRICAN AMERICAN): 5.9 ML/MIN/1.73 M^2
GLUCOSE SERPL-MCNC: 200 MG/DL (ref 70–110)
HCT VFR BLD AUTO: 25.2 % (ref 37–48.5)
HGB BLD-MCNC: 8.3 G/DL (ref 12–16)
IMM GRANULOCYTES # BLD AUTO: 0.03 K/UL (ref 0–0.04)
IMM GRANULOCYTES NFR BLD AUTO: 0.4 % (ref 0–0.5)
LYMPHOCYTES # BLD AUTO: 0.3 K/UL (ref 1–4.8)
LYMPHOCYTES NFR BLD: 3.8 % (ref 18–48)
MAGNESIUM SERPL-MCNC: 2.1 MG/DL (ref 1.6–2.6)
MCH RBC QN AUTO: 29 PG (ref 27–31)
MCHC RBC AUTO-ENTMCNC: 32.9 G/DL (ref 32–36)
MCV RBC AUTO: 88 FL (ref 82–98)
MONOCYTES # BLD AUTO: 0.3 K/UL (ref 0.3–1)
MONOCYTES NFR BLD: 3.5 % (ref 4–15)
NEUTROPHILS # BLD AUTO: 6.6 K/UL (ref 1.8–7.7)
NEUTROPHILS NFR BLD: 92.2 % (ref 38–73)
NRBC BLD-RTO: 0 /100 WBC
PHOSPHATE SERPL-MCNC: 5.6 MG/DL (ref 2.7–4.5)
PLATELET # BLD AUTO: 219 K/UL (ref 150–350)
PMV BLD AUTO: 10.8 FL (ref 9.2–12.9)
POTASSIUM SERPL-SCNC: 4.1 MMOL/L (ref 3.5–5.1)
RBC # BLD AUTO: 2.86 M/UL (ref 4–5.4)
SODIUM SERPL-SCNC: 137 MMOL/L (ref 136–145)
T4 FREE SERPL-MCNC: 0.79 NG/DL (ref 0.71–1.51)
TROPONIN I SERPL DL<=0.01 NG/ML-MCNC: 0.04 NG/ML (ref 0.02–0.04)
TSH SERPL DL<=0.005 MIU/L-ACNC: 3.15 UIU/ML (ref 0.34–5.6)
WBC # BLD AUTO: 7.16 K/UL (ref 3.9–12.7)

## 2019-09-30 PROCEDURE — 94761 N-INVAS EAR/PLS OXIMETRY MLT: CPT

## 2019-09-30 PROCEDURE — 25000242 PHARM REV CODE 250 ALT 637 W/ HCPCS: Performed by: HOSPITALIST

## 2019-09-30 PROCEDURE — 63600175 PHARM REV CODE 636 W HCPCS: Performed by: FAMILY MEDICINE

## 2019-09-30 PROCEDURE — 90935 HEMODIALYSIS ONE EVALUATION: CPT

## 2019-09-30 PROCEDURE — 99900035 HC TECH TIME PER 15 MIN (STAT)

## 2019-09-30 PROCEDURE — 94640 AIRWAY INHALATION TREATMENT: CPT

## 2019-09-30 PROCEDURE — 83735 ASSAY OF MAGNESIUM: CPT

## 2019-09-30 PROCEDURE — 36415 COLL VENOUS BLD VENIPUNCTURE: CPT

## 2019-09-30 PROCEDURE — 21000000 HC CCU ICU ROOM CHARGE

## 2019-09-30 PROCEDURE — 25000003 PHARM REV CODE 250: Performed by: NURSE PRACTITIONER

## 2019-09-30 PROCEDURE — 63600175 PHARM REV CODE 636 W HCPCS: Performed by: NURSE PRACTITIONER

## 2019-09-30 PROCEDURE — 25000003 PHARM REV CODE 250: Performed by: INTERNAL MEDICINE

## 2019-09-30 PROCEDURE — 85025 COMPLETE CBC W/AUTO DIFF WBC: CPT

## 2019-09-30 PROCEDURE — 93005 ELECTROCARDIOGRAM TRACING: CPT

## 2019-09-30 PROCEDURE — 80048 BASIC METABOLIC PNL TOTAL CA: CPT

## 2019-09-30 PROCEDURE — 27000221 HC OXYGEN, UP TO 24 HOURS

## 2019-09-30 PROCEDURE — 25000242 PHARM REV CODE 250 ALT 637 W/ HCPCS: Performed by: NURSE PRACTITIONER

## 2019-09-30 PROCEDURE — 84100 ASSAY OF PHOSPHORUS: CPT

## 2019-09-30 RX ORDER — LEVALBUTEROL 1.25 MG/.5ML
1.25 SOLUTION, CONCENTRATE RESPIRATORY (INHALATION)
Status: DISCONTINUED | OUTPATIENT
Start: 2019-10-01 | End: 2019-09-30

## 2019-09-30 RX ORDER — SODIUM THIOSULFATE 250 MG/ML
12.5 INJECTION, SOLUTION INTRAVENOUS
Status: DISCONTINUED | OUTPATIENT
Start: 2019-09-30 | End: 2019-10-01 | Stop reason: HOSPADM

## 2019-09-30 RX ORDER — DILTIAZEM HYDROCHLORIDE 60 MG/1
60 TABLET, FILM COATED ORAL EVERY 8 HOURS
Status: DISCONTINUED | OUTPATIENT
Start: 2019-09-30 | End: 2019-10-01 | Stop reason: HOSPADM

## 2019-09-30 RX ORDER — MUPIROCIN 20 MG/G
OINTMENT TOPICAL 2 TIMES DAILY
Status: DISCONTINUED | OUTPATIENT
Start: 2019-09-30 | End: 2019-10-01 | Stop reason: HOSPADM

## 2019-09-30 RX ORDER — SODIUM CHLORIDE 9 MG/ML
INJECTION, SOLUTION INTRAVENOUS ONCE
Status: DISCONTINUED | OUTPATIENT
Start: 2019-09-30 | End: 2019-10-01 | Stop reason: HOSPADM

## 2019-09-30 RX ORDER — IPRATROPIUM BROMIDE 0.5 MG/2.5ML
0.5 SOLUTION RESPIRATORY (INHALATION)
Status: DISCONTINUED | OUTPATIENT
Start: 2019-10-01 | End: 2019-10-01

## 2019-09-30 RX ORDER — LEVALBUTEROL 1.25 MG/.5ML
1.25 SOLUTION, CONCENTRATE RESPIRATORY (INHALATION) EVERY 6 HOURS PRN
Status: DISCONTINUED | OUTPATIENT
Start: 2019-09-30 | End: 2019-10-01 | Stop reason: HOSPADM

## 2019-09-30 RX ORDER — PREDNISONE 20 MG/1
40 TABLET ORAL DAILY
Status: DISCONTINUED | OUTPATIENT
Start: 2019-10-01 | End: 2019-10-01 | Stop reason: HOSPADM

## 2019-09-30 RX ORDER — LEVALBUTEROL 1.25 MG/.5ML
1.25 SOLUTION, CONCENTRATE RESPIRATORY (INHALATION)
Status: DISCONTINUED | OUTPATIENT
Start: 2019-10-01 | End: 2019-10-01

## 2019-09-30 RX ORDER — LORAZEPAM 2 MG/ML
0.5 INJECTION INTRAMUSCULAR ONCE
Status: COMPLETED | OUTPATIENT
Start: 2019-09-30 | End: 2019-09-30

## 2019-09-30 RX ORDER — SODIUM CHLORIDE 9 MG/ML
INJECTION, SOLUTION INTRAVENOUS
Status: DISCONTINUED | OUTPATIENT
Start: 2019-09-30 | End: 2019-10-01 | Stop reason: HOSPADM

## 2019-09-30 RX ADMIN — LOSARTAN POTASSIUM 25 MG: 25 TABLET, FILM COATED ORAL at 04:09

## 2019-09-30 RX ADMIN — METHYLPREDNISOLONE SODIUM SUCCINATE 40 MG: 40 INJECTION, POWDER, FOR SOLUTION INTRAMUSCULAR; INTRAVENOUS at 05:09

## 2019-09-30 RX ADMIN — CALCITRIOL CAPSULES 0.25 MCG 0.25 MCG: 0.25 CAPSULE ORAL at 04:09

## 2019-09-30 RX ADMIN — IPRATROPIUM BROMIDE 0.5 MG: 0.5 SOLUTION RESPIRATORY (INHALATION) at 12:09

## 2019-09-30 RX ADMIN — ASPIRIN 81 MG 81 MG: 81 TABLET ORAL at 04:09

## 2019-09-30 RX ADMIN — RENAGEL 800 MG: 800 TABLET ORAL at 04:09

## 2019-09-30 RX ADMIN — DOXYCYCLINE HYCLATE 100 MG: 100 CAPSULE ORAL at 09:09

## 2019-09-30 RX ADMIN — DILTIAZEM HYDROCHLORIDE 5 MG/HR: 5 INJECTION INTRAVENOUS at 10:09

## 2019-09-30 RX ADMIN — LORAZEPAM 0.5 MG: 2 INJECTION INTRAMUSCULAR; INTRAVENOUS at 12:09

## 2019-09-30 RX ADMIN — METOPROLOL SUCCINATE 50 MG: 25 TABLET, FILM COATED, EXTENDED RELEASE ORAL at 04:09

## 2019-09-30 RX ADMIN — DILTIAZEM HYDROCHLORIDE 60 MG: 60 TABLET, FILM COATED ORAL at 09:09

## 2019-09-30 RX ADMIN — METHYLPREDNISOLONE SODIUM SUCCINATE 40 MG: 40 INJECTION, POWDER, FOR SOLUTION INTRAMUSCULAR; INTRAVENOUS at 04:09

## 2019-09-30 RX ADMIN — ISOSORBIDE MONONITRATE 30 MG: 30 TABLET, EXTENDED RELEASE ORAL at 04:09

## 2019-09-30 RX ADMIN — SENNOSIDES AND DOCUSATE SODIUM 1 TABLET: 8.6; 5 TABLET ORAL at 04:09

## 2019-09-30 RX ADMIN — ENOXAPARIN SODIUM 50 MG: 100 INJECTION SUBCUTANEOUS at 04:09

## 2019-09-30 RX ADMIN — IPRATROPIUM BROMIDE 0.5 MG: 0.5 SOLUTION RESPIRATORY (INHALATION) at 07:09

## 2019-09-30 RX ADMIN — SODIUM THIOSULFATE 12.5 G: 250 INJECTION, SOLUTION INTRAVENOUS at 01:09

## 2019-09-30 RX ADMIN — LEVALBUTEROL HYDROCHLORIDE 1.25 MG: 1.25 SOLUTION, CONCENTRATE RESPIRATORY (INHALATION) at 07:09

## 2019-09-30 RX ADMIN — LEVALBUTEROL HYDROCHLORIDE 1.25 MG: 1.25 SOLUTION, CONCENTRATE RESPIRATORY (INHALATION) at 12:09

## 2019-09-30 RX ADMIN — MAGNESIUM OXIDE 400 MG: 400 TABLET ORAL at 04:09

## 2019-09-30 RX ADMIN — MUPIROCIN: 20 OINTMENT TOPICAL at 04:09

## 2019-09-30 RX ADMIN — FLUTICASONE FUROATE AND VILANTEROL TRIFENATATE 1 PUFF: 100; 25 POWDER RESPIRATORY (INHALATION) at 07:09

## 2019-09-30 RX ADMIN — DOXYCYCLINE HYCLATE 100 MG: 100 CAPSULE ORAL at 04:09

## 2019-09-30 NOTE — CONSULTS
"[facility]  Nephrology  Consult Note    Patient Name: Griselda Neely  MRN: [id number]  Admission Date: 9/29/2019  Hospital Length of Stay: 0 days  Attending Provider: Cristian Lopez MD   Primary Care Physician: Kalie Neely MD  Principal Problem:Acute exacerbation of chronic obstructive pulmonary disease (COPD)    Inpatient consult to Nephrology  Consult performed by: Dane Martinez MD  Consult ordered by: Maggie Carpio, NP      : hx ESRD (HD-MWF via R UE AVF); requests inpatient maintenance HD  Subjective:     HPI:   72 y/o F w/ "multiple medical problems" (now 'DNR' designee) including ESRD (HD-MWD via R UE AV Fistula), HTN, HFrEF, Valvular heart disease (s/p MVR), Gouty Arthropathy, Pancreatitis, PVD (w/ B LE calciphylaxis on Na thiosulfate w/ HD), Chronic Respiratory Failure (hx long-standing COPD) &  Tobacco Use d/o admitted on 9/29/19 w/ signs/symptoms c/w COPD exacerbation (progressively worsening SoB/cough x last 1-2 days requiring NIPPV on ED presentation); formal Nephrology service consultation is requested to provide inpatient maintenance RRT    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    both legs    CHF (congestive heart failure)     Encounter for blood transfusion 03/2016    Gout     Hypertension     Mitral valve regurgitation     Osteoarthritis     Pancreatitis     Peripheral vascular disease     Peritoneal dialysis catheter in place     Pneumonia 09/09/2017    Renal failure        Past Surgical History:   Procedure Laterality Date    ACHILLES TENDON SURGERY Right     APPENDECTOMY      CARDIAC CATHETERIZATION  07/03/2017    CHOLECYSTECTOMY      heal surgery Right     HYSTERECTOMY      PARATHYROIDECTOMY      PARATHYROIDECTOMY  07/13/2017    PERITONEAL CATHETER INSERTION      RENAL BIOPSY      vocal cord nodule         Review of patient's allergies indicates:  No Known Allergies  Current Facility-Administered " Medications   Medication Frequency    acetaminophen tablet 650 mg Q4H PRN    aspirin chewable tablet 81 mg Daily    calcitRIOL capsule 0.25 mcg Daily    diltiaZEM 125 mg in dextrose 5% 125 mL infusion (non-titrating) Continuous    doxycycline capsule 100 mg Q12H    enoxaparin injection 50 mg Daily    fluticasone furoate-vilanterol 100-25 mcg/dose diskus inhaler 1 puff Daily    ipratropium 0.02 % nebulizer solution 0.5 mg Q6H    isosorbide mononitrate 24 hr tablet 30 mg Daily    levalbuterol nebulizer solution 1.25 mg Q6H    losartan tablet 25 mg Daily    magnesium oxide tablet 400 mg Daily    methylPREDNISolone sodium succinate injection 40 mg Q8H    metoprolol succinate (TOPROL-XL) 24 hr tablet 50 mg Daily    ondansetron injection 4 mg Q8H PRN    oxyCODONE-acetaminophen 5-325 mg per tablet 1 tablet Q6H PRN    senna-docusate 8.6-50 mg per tablet 1 tablet BID    sevelamer HCl tablet 800 mg TID WM    sodium chloride 0.9% flush 10 mL PRN     Family History     Problem Relation (Age of Onset)    Arthritis Mother    Diabetes Mother, Father    Early death Sister, Sister    Heart disease Sister, Maternal Grandfather, Mother    Heart failure Mother    Hypertension Mother        Tobacco Use    Smoking status: Current Some Day Smoker     Packs/day: 0.50     Years: 45.00     Pack years: 22.50    Smokeless tobacco: Never Used   Substance and Sexual Activity    Alcohol use: No    Drug use: No    Sexual activity: Not on file     Review of Systems   Constitutional: Positive for activity change and fatigue.   HENT: Positive for congestion.    Eyes: Negative.    Respiratory: Positive for cough, chest tightness, shortness of breath and wheezing.    Cardiovascular: Negative for chest pain, palpitations and leg swelling.   Gastrointestinal: Negative.    Endocrine: Negative.    Genitourinary: Negative.    Musculoskeletal: Negative.    Skin: Negative.    Allergic/Immunologic: Negative.    Neurological: Negative.     Hematological: Negative.    Psychiatric/Behavioral: Negative.      Objective:     Vital Signs (Most Recent):  Temp: 97.7 °F (36.5 °C) (09/30/19 0332)  Pulse: 109 (09/30/19 0332)  Resp: (!) 24 (09/30/19 0332)  BP: (!) 146/106 (09/30/19 0332)  SpO2: 100 % (09/30/19 0332)  O2 Device (Oxygen Therapy): BiPAP (09/30/19 0332) Vital Signs (24h Range):  Temp:  [97.7 °F (36.5 °C)-98.1 °F (36.7 °C)] 97.7 °F (36.5 °C)  Pulse:  [] 109  Resp:  [14-29] 24  SpO2:  [94 %-100 %] 100 %  BP: (100-190)/() 146/106     Weight: 61 kg (134 lb 7.7 oz) (09/30/19 0500)  Body mass index is 22.38 kg/m².  Body surface area is 1.67 meters squared.    No intake/output data recorded.    Physical Exam   Constitutional: She is oriented to person, place, and time. She appears distressed.   Frail, chronically ill-appearing   HENT:   Head: Normocephalic and atraumatic.   B temporal wasting  B conjunctival injection   Eyes: Right eye exhibits no discharge. Left eye exhibits no discharge. No scleral icterus.   Neck: Normal range of motion. Neck supple. No JVD present.   Cardiovascular: Normal rate. Exam reveals no gallop and no friction rub.   Murmur heard.  Irregularly irregular rhythm   Pulmonary/Chest: Effort normal. No respiratory distress. She has wheezes. She has no rales. She exhibits no tenderness.   Coarse, diffuse BS B   Abdominal: She exhibits no distension and no mass. There is no tenderness.   Hypoactive BS in all quadrants   Genitourinary:   Genitourinary Comments: Deferred   Musculoskeletal: Normal range of motion. She exhibits no edema, tenderness or deformity.   R UE AV Fistula w/ good thrill   Neurological: She is alert and oriented to person, place, and time. A cranial nerve deficit is present.   Skin: Skin is warm and dry. She is not diaphoretic. No erythema.   Psychiatric: She has a normal mood and affect. Her behavior is normal.   Nursing note and vitals reviewed.      Significant Labs:  ABGs: No results for input(s):  PH, PCO2, HCO3, POCSATURATED, BE in the last 168 hours.  CBC:   Recent Labs   Lab 09/29/19  1049   WBC 5.96   RBC 3.23*   HGB 9.3*   HCT 28.9*      MCV 90   MCH 28.8   MCHC 32.2     CMP:   Recent Labs   Lab 09/29/19  1049 09/29/19  2304   *  --    CALCIUM 7.6*  --    ALBUMIN 2.9*  --    PROT 7.7  --      --    K 3.9  --    CO2 29  --    CL 90*  --    BUN 34*  --    CREATININE 5.7* 6.4*   ALKPHOS 164*  --    ALT 24  --    AST 33  --    BILITOT 0.7  --        Significant Imaging:  X-Ray: Reviewed    Assessment/Plan:     Active Diagnoses:    Diagnosis Date Noted POA    PRINCIPAL PROBLEM:  Acute exacerbation of chronic obstructive pulmonary disease (COPD) [J44.1] 09/29/2019 Yes    Acute on chronic combined systolic and diastolic congestive heart failure [I50.43] 09/29/2019 Yes    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 [E83.59, L97.922] 07/20/2017 Yes    Atrial fibrillation with rapid ventricular response [I48.91] 07/18/2017 Yes    ESRD (end stage renal disease) [N18.6] 05/11/2016 Yes    HTN (hypertension) [I10] 08/15/2013 Yes      Problems Resolved During this Admission:     1. ESRD (HD-MWF via R UE AV Fistula)- clinically stable at present; no overt volume overload, uremic signs/symptoms, electrolyte perturbations nor acid-base disturbance; no active issues    -maintenance HD (duration: 3h; UF Goal: 2-3L as tolerated; 3K Electrolyte Standard 'bath'; Access: AVF; Qb: 400 ml/min, Qd: 2x BFR; 12.5 gm of Na thiosulfate over last 30-60 min of treatment)) per outpatient schedule    -DAILY renal function panel to document sCr trend, optimize electrolyte 'bath' & assess response to therapy    -avoid nephrotoxic agents (NSAIDs, IV contrast dye, MRI w/ Gadolinium constrast)    -renally dose all appropriate medications, including antibiotics     2. HTN- clinically stable at present; BP NOT AT GOAL (146/106); no active issues    -continue current anti-HTN drug  regimen (GIVE BP MEDS POST HD on HD Days) + HD-associated UF to achieve & maintain goal BP (< 150/90)     3. ANEMIA- clinically stable at present; H/H AT GOAL (9.3/28.9); no active issues    -trend DAILY CBC (H/H) to effect optimal blood-loss surveillance     4. SECONDARY HYPERPARATHYROIDISM (sHPT)-- clinically stable on current therapy (Na thiosulfate 12.5 gm x last 30-60 min of HD); no active issues    -continue dietary binder/Vitamin D +/- HD-associated calcimimetic therapy (Cinacalcet vs Etelcalcetide)     5. CHRONIC RESPIRATORY FAILURE w/ Acute Exacerbation- symptomatically improved on scheduled bronchodilator/IV steroid/supplemental O2 therapy; no active issues    -management per Primary team +/- Pulmonary Medicine service recommendations    Thank you for your consult. I will follow-up with patient. Please contact us if you have any additional questions.    Dane Martinez III, MD  [facility]  [facility]  [phone] (C)

## 2019-09-30 NOTE — PLAN OF CARE
09/30/19 0919   Discharge Assessment   Assessment Type Discharge Planning Assessment   Confirmed/corrected address and phone number on facesheet? Yes   Assessment information obtained from? Patient;Other;Medical Record  (sister Elvi Hi)   Prior to hospitilization cognitive status: Alert/Oriented   Prior to hospitalization functional status: Needs Assistance  (sister Aide or son sometimes help with ADLs depending on how pt is feeling)   Current cognitive status: Alert/Oriented   Current Functional Status: Needs Assistance   Facility Arrived From: home   Lives With sibling(s)  (sister Aide Watson [phone])   Able to Return to Prior Arrangements yes   Is patient able to care for self after discharge? Unable to determine at this time (comments)   Who are your caregiver(s) and their phone number(s)? Aide Watson (see above)   Readmission Within the Last 30 Days previous discharge plan unsuccessful   If yes, most recent facility name: [facility]   Patient currently being followed by outpatient case management? No   Patient currently receives any other outside agency services? Yes   Name and contact number of agency or person providing outside services [facility]   Is it the patient/care giver preference to resume care with the current outside agency? Yes   Equipment Currently Used at Home bedside commode;bath bench;cane, quad;cane, straight;oxygen;walker, rolling;wound care supplies   Do you have any problems affording any of your prescribed medications? TBD  (pt's sister states one med is expensive, can't remember which. Sister Elvi abrton Jovany from [facility] pharmacy has spoken with them before.)   Is the patient taking medications as prescribed? yes   Does the patient have transportation home? Yes   Transportation Anticipated family or friend will provide   [facility] Fri time recently changed to 10 AM   Discharge Plan A Home with family;Home  "Health   DME Needed Upon Discharge  other (see comments)  (TBD)       Pt was recently hospitalized at [facility] and has returned due to "trouble breathing." Pt's sister is concerned that oxygen is "not always enough" and asked about other devices pt could use at night. CONNER advised sister to follow up with doctor, and CONNER/LENNIE will also mention to nurse and hospitalist.     Pt also expressed a desire to resume care with [facility] . CONNER presented and reviewed pt choice form, which pt requested her sister Elvi sign as she has trouble signing due to "shaking." Form will be scanned in pt's chart and referral to resume care will be sent by CONNER/LENNIE at d/c.  "

## 2019-09-30 NOTE — HOSPITAL COURSE
Admitted for acute COPD exacerbation and developed A. fib with RVR. Started on Cardizem drip and cardiology adjusting medications. COPD seems to be improving now breathing on 3 L home oxygen. She uses oxygen when necessary at home, typically 3 L. Patient was weaned off cardizem. Patient was stable at discharge with the shortness follow-up with nephrology, cardiology, PCP.    General: Patient resting comfortably in no acute distress. Appears as stated age. Calm  Eyes: EOM intact. No conjunctivae injection. No scleral icterus.  ENT: Hearing grossly intact. No discharge from ears. No nasal discharge.   CVS: IRR. No LE edema BL.  Lungs: CTA BL, no wheezing or crackles. Good breath sounds. No accessory muscle use. No acute respiratory distress  Neuro: AOx3. GCS 15. Cranial nerves grossly intact. Moves all extremities equally. Follows commands. Responds appropriately

## 2019-09-30 NOTE — SUBJECTIVE & OBJECTIVE
Interval History:  Patient sitting up in chair he continues to rate in the 110s to 120s on Cardizem. Reports that shortness of breath has improved. Currently on 3.5 L nasal cannula saturating 94%. Patient currently uses 3 L home when necessary. Cardiology was see patient today. Patient scheduled for dialysis today.    Review of Systems   Constitutional: Negative for chills, fatigue, fever and unexpected weight change.   HENT: Negative for sore throat.    Eyes: Negative for visual disturbance.   Respiratory: Negative for cough, chest tightness and shortness of breath.    Cardiovascular: Negative for chest pain.   Gastrointestinal: Negative for abdominal pain, constipation, diarrhea, nausea and vomiting.   Endocrine: Negative for polyuria.   Genitourinary: Negative for dysuria and hematuria.   Neurological: Negative for headaches.     Objective:     Vital Signs (Most Recent):  Temp: 97.6 °F (36.4 °C) (09/30/19 0740)  Pulse: (!) 114 (09/30/19 0740)  Resp: 20 (09/30/19 0740)  BP: (!) 155/85 (09/30/19 1245)  SpO2: 96 % (09/30/19 0740) Vital Signs (24h Range):  Temp:  [97.6 °F (36.4 °C)-98.1 °F (36.7 °C)] 97.6 °F (36.4 °C)  Pulse:  [] 114  Resp:  [19-25] 20  SpO2:  [96 %-100 %] 96 %  BP: (100-165)/() 155/85     Weight: 61 kg (134 lb 7.7 oz)  Body mass index is 22.38 kg/m².  No intake or output data in the 24 hours ending 09/30/19 1401      Physical Exam   Constitutional: She is oriented to person, place, and time. She appears well-developed and well-nourished. No distress.   HENT:   Right Ear: External ear normal.   Left Ear: External ear normal.   Eyes: Conjunctivae and EOM are normal. Right eye exhibits no discharge. Left eye exhibits no discharge.   Neck: Normal range of motion.   Cardiovascular: Normal rate, regular rhythm and normal heart sounds. Exam reveals no gallop and no friction rub.   No murmur heard.  Pulmonary/Chest: Effort normal. No respiratory distress. She has wheezes (Mild end expiratory  wheezing BL lung bases). She has no rales. She exhibits no tenderness.   Diminished breath sounds   Musculoskeletal: She exhibits no edema or tenderness.   Neurological: She is alert and oriented to person, place, and time.   Skin: Skin is warm. She is not diaphoretic.   Psychiatric: She has a normal mood and affect. Her behavior is normal. Judgment and thought content normal.   Nursing note and vitals reviewed.      Significant Labs:   CBC:   Recent Labs   Lab 09/29/19  1049 09/30/19  1130   WBC 5.96 7.16   HGB 9.3* 8.3*   HCT 28.9* 25.2*    219     CMP:   Recent Labs   Lab 09/29/19  1049 09/29/19  2304 09/30/19  1130     --  137   K 3.9  --  4.1   CL 90*  --  89*   CO2 29  --  26   *  --  200*   BUN 34*  --  45*   CREATININE 5.7* 6.4* 6.6*   CALCIUM 7.6*  --  7.3*   PROT 7.7  --   --    ALBUMIN 2.9*  --   --    BILITOT 0.7  --   --    ALKPHOS 164*  --   --    AST 33  --   --    ALT 24  --   --    ANIONGAP 20*  --  22*   EGFRNONAA 6.8* 5.9* 5.7*       Significant Imaging: I have reviewed all pertinent imaging results/findings within the past 24 hours.

## 2019-09-30 NOTE — NURSING
Pt on continuous BIPAP throughout night.     Some complaints of SOB relieved by re-positioning.     Son at bedside throughout night.    Pt refused am labs. Labs to be drawn in HD.     Ginger Teran

## 2019-09-30 NOTE — PLAN OF CARE
09/29/19 1921   Patient Assessment/Suction   Level of Consciousness (AVPU) alert   Respiratory Effort Normal;Unlabored   Expansion/Accessory Muscles/Retractions no retractions;no use of accessory muscles   All Lung Fields Breath Sounds coarse   PRE-TX-O2   O2 Device (Oxygen Therapy) BiPAP   Oxygen Concentration (%) 28   SpO2 97 %   Pulse 94   Resp 20   Aerosol Therapy   $ Aerosol Therapy Charges Aerosol Treatment   Respiratory Treatment Status (SVN) given   Treatment Route (SVN) in-line;mask   Patient Position (SVN) HOB elevated   Post Treatment Assessment (SVN) breath sounds improved   Signs of Intolerance (SVN) none   Breath Sounds Post-Respiratory Treatment   Throughout All Fields Post-Treatment All Fields   Throughout All Fields Post-Treatment aeration increased   Post-treatment Heart Rate (beats/min) 106   Post-treatment Resp Rate (breaths/min) 23   Preset CPAP/BiPAP Settings   Mode Of Delivery BiPAP S/T   $ CPAP/BiPAP Daily Charge BiPAP/CPAP Daily   $ Initial CPAP/BiPAP Setup? No   $ Is patient using? Yes   Size of Mask Small   Sized Appropriately? Yes   Equipment Type V60   Airway Device Type small full face mask   Ipap 10   EPAP (cm H2O) 5   Pressure Support (cm H2O) 5   ITime (sec) 1   Rise Time (sec) 3   Patient CPAP/BiPAP Settings   Timed Inspiration (Sec) 1   IPAP Rise Time (sec) 3   RR Total (Breaths/Min) 22   Tidal Volume (mL) 349   VE Minute Ventilation (L/min) 7.6 L/min   Peak Inspiratory Pressure (cm H2O) 10   TiTOT (%) 32   Total Leak (L/Min) 21   Patient Trigger - ST Mode Only (%) 99   CPAP/BiPAP Alarms   High Pressure (cm H2O) 30   Low Pressure (cm H2O) 10   Low Pressure Delay (Sec) 10   Minute Ventilation (L/Min) 3   High RR (breaths/min) 40   Low RR (breaths/min) 8   Apnea (Sec) 20

## 2019-09-30 NOTE — CARE UPDATE
09/30/19 0740   PRE-TX-O2   O2 Device (Oxygen Therapy) nasal cannula   Flow (L/min) 3.5   SpO2 96 %   Pulse (!) 114   Resp 20   Temp 97.6 °F (36.4 °C)   BP (!) 141/106   Aerosol Therapy   $ Aerosol Therapy Charges Aerosol Treatment   Daily Review of Necessity (SVN) completed   Respiratory Treatment Status (SVN) given   Treatment Route (SVN) mask   Patient Position (SVN) sitting on edge of bed   Post Treatment Assessment (SVN) breath sounds unchanged   Signs of Intolerance (SVN) none   Inhaler   $ Inhaler Charges MDI (Metered Dose Inahler) Treatment;Mouth rinsed post treatment;On hold   Daily Review of Necessity (Inhaler) completed   Respiratory Treatment Status (Inhaler) given   Treatment Route (Inhaler) breath hold;mouthpiece   Patient Position (Inhaler) sitting on edge of bed   Post Treatment Assessment (Inhaler) breath sounds unchanged   Signs of Intolerance (Inhaler) none   Breath Sounds Post-Respiratory Treatment   Throughout All Fields Post-Treatment All Fields   Throughout All Fields Post-Treatment clear;coarse   Post-treatment Heart Rate (beats/min) 111   Post-treatment Resp Rate (breaths/min) 22   Preset CPAP/BiPAP Settings   Mode Of Delivery BiPAP   $ CPAP/BiPAP Daily Charge   (on sb)   $ Is patient using? No/refused

## 2019-09-30 NOTE — PROGRESS NOTES
[facility] Medicine  Progress Note    Patient Name: Griselda Neely  MRN: [id number]  Patient Class: OP- Observation   Admission Date: 9/29/2019  Length of Stay: 0 days  Attending Physician: Jhoan Fuentes MD  Primary Care Provider: Kalie Neely MD        Subjective:     Principal Problem:Acute exacerbation of chronic obstructive pulmonary disease (COPD)    HPI:  Ms. Neely presents today with complaints of SOB. It is severe. It is associated with cough, but feels unable to cough anything up, and increased wheezing. She denies fever, chills, N/V/D, dizziness, or LOC. She reports symptoms began on Friday and have gotten progressively worse. She has a history of ESRD on HD MWF with calciphylaxis, afib not on anticoagulation, COPD, and recurrent encephalopathy. She was recently admitted earlier this month for pneumonia, but has completed her antibiotics. She is currently AAOx4. She continues to smoke cigarettes. She is off of they BiPAP at the time of exam, but began feeling SOB, her pulse ox remained at 97% throughout this, but RT was called and BiPAP re applied. She expresses her wishes to be a DNR.    Overview/Hospital Course:  Admitted for acute COPD exacerbation and developed A. fib with RVR. Started on Cardizem drip and cardiology adjusting medications. COPD seems to be improving now breathing on 3 L home oxygen. Patient reports that s at home. He uses oxygen when necessary at home.    Interval History:  Patient sitting up in chair he continues to rate in the 110s to 120s on Cardizem. Reports that shortness of breath has improved. Currently on 3.5 L nasal cannula saturating 94%. Patient currently uses 3 L home when necessary. Cardiology was see patient today. Patient scheduled for dialysis today.    Review of Systems   Constitutional: Negative for chills, fatigue, fever and unexpected weight change.   HENT: Negative for sore throat.    Eyes: Negative for visual disturbance.    Respiratory: Negative for cough, chest tightness and shortness of breath.    Cardiovascular: Negative for chest pain.   Gastrointestinal: Negative for abdominal pain, constipation, diarrhea, nausea and vomiting.   Endocrine: Negative for polyuria.   Genitourinary: Negative for dysuria and hematuria.   Neurological: Negative for headaches.     Objective:     Vital Signs (Most Recent):  Temp: 97.6 °F (36.4 °C) (09/30/19 0740)  Pulse: (!) 114 (09/30/19 0740)  Resp: 20 (09/30/19 0740)  BP: (!) 155/85 (09/30/19 1245)  SpO2: 96 % (09/30/19 0740) Vital Signs (24h Range):  Temp:  [97.6 °F (36.4 °C)-98.1 °F (36.7 °C)] 97.6 °F (36.4 °C)  Pulse:  [] 114  Resp:  [19-25] 20  SpO2:  [96 %-100 %] 96 %  BP: (100-165)/() 155/85     Weight: 61 kg (134 lb 7.7 oz)  Body mass index is 22.38 kg/m².  No intake or output data in the 24 hours ending 09/30/19 1401      Physical Exam   Constitutional: She is oriented to person, place, and time. She appears well-developed and well-nourished. No distress.   HENT:   Right Ear: External ear normal.   Left Ear: External ear normal.   Eyes: Conjunctivae and EOM are normal. Right eye exhibits no discharge. Left eye exhibits no discharge.   Neck: Normal range of motion.   Cardiovascular: Normal rate, regular rhythm and normal heart sounds. Exam reveals no gallop and no friction rub.   No murmur heard.  Pulmonary/Chest: Effort normal. No respiratory distress. She has wheezes (Mild end expiratory wheezing BL lung bases). She has no rales. She exhibits no tenderness.   Diminished breath sounds   Musculoskeletal: She exhibits no edema or tenderness.   Neurological: She is alert and oriented to person, place, and time.   Skin: Skin is warm. She is not diaphoretic.   Psychiatric: She has a normal mood and affect. Her behavior is normal. Judgment and thought content normal.   Nursing note and vitals reviewed.      Significant Labs:   CBC:   Recent Labs   Lab 09/29/19  1049 09/30/19  1130   WBC  5.96 7.16   HGB 9.3* 8.3*   HCT 28.9* 25.2*    219     CMP:   Recent Labs   Lab 09/29/19  1049 09/29/19  2304 09/30/19  1130     --  137   K 3.9  --  4.1   CL 90*  --  89*   CO2 29  --  26   *  --  200*   BUN 34*  --  45*   CREATININE 5.7* 6.4* 6.6*   CALCIUM 7.6*  --  7.3*   PROT 7.7  --   --    ALBUMIN 2.9*  --   --    BILITOT 0.7  --   --    ALKPHOS 164*  --   --    AST 33  --   --    ALT 24  --   --    ANIONGAP 20*  --  22*   EGFRNONAA 6.8* 5.9* 5.7*       Significant Imaging: I have reviewed all pertinent imaging results/findings within the past 24 hours.      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    ESRD (end stage renal disease)    HTN (hypertension)      * Acute exacerbation of chronic obstructive pulmonary disease (COPD)  Admit to cardiology A   BiPAP PRN for comfort   Nebs q6h  Steroids IV for 24 hours   Doxycycline       Atrial fibrillation with rapid ventricular response  Cardizem gtt per protocol  Consult cardiology   Lovenox wt based q24h for ESRD      Acute on chronic combined systolic and diastolic congestive heart failure  CXR appears stable   BNP >4000   On HD, will dialyzed in AM   Suspect dyspnea is more from COPD exac      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 Ingram for HD management for MWF  Pt reports she stopped taking her phosphorus binders, will resume      HTN (hypertension)  Continue home meds      VTE Risk Mitigation (From admission, onward)         Ordered     enoxaparin injection 50 mg  Daily       09/29/19 1402     IP VTE HIGH RISK PATIENT  Once      09/29/19 1402                      Jhoan Fuentes MD  Department of Hospital Medicine   [facility]  Date of service: 09/30/2019 2:04 PM

## 2019-09-30 NOTE — PT/OT/SLP PROGRESS
Physical Therapy      Patient Name:  Griselda Neely   MRN:  [id number]    Patient not seen today secondary to Dialysis. Will follow-up 10/01/19.    Heike Fraser, PT

## 2019-09-30 NOTE — PROGRESS NOTES
[facility]  Cardiology  Progress Note    Patient Name: Griselda Neely  MRN: [id number]  Admission Date: 9/29/2019  Hospital Length of Stay: 0 days  Code Status: DNR   Attending Physician: Jhoan Fuentes MD   Primary Care Physician: Kalie Neely MD  Expected Discharge Date:   Principal Problem:Acute exacerbation of chronic obstructive pulmonary disease (COPD)    Subjective:       Interval History:   Heart rate is up to 120-130s. She has just returned from dialysis. She tolerated 3 L off. She is eating her lunch now. Denies any chest pain. Reports the shortness of breath is better.    ROS   No significant headaches or sore throat or runny nose.   No recent changes in vision.   No recent changes in hearing.  No dysphagia or odynophagia.  Reports shortness of breath at baseline.   Denies any cough or hemoptysis.   Denies any abdominal pain, nausea, vomiting, diarrhea or constipation.   Denies any dysuria or polyuria.   Denies any fevers or chills.   Denies any recent significant weight changes.   Denies bleeding diathesis    Objective:     Vital Signs (Most Recent):  Temp: 97.6 °F (36.4 °C) (09/30/19 0740)  Pulse: (!) 114 (09/30/19 0740)  Resp: 20 (09/30/19 0740)  BP: (!) 155/85 (09/30/19 1245)  SpO2: 96 % (09/30/19 0740) Vital Signs (24h Range):  Temp:  [97.6 °F (36.4 °C)-98.1 °F (36.7 °C)] 97.6 °F (36.4 °C)  Pulse:  [] 114  Resp:  [19-24] 20  SpO2:  [96 %-100 %] 96 %  BP: (100-163)/() 155/85     Weight: 61 kg (134 lb 7.7 oz)  Body mass index is 22.38 kg/m².    SpO2: 96 %  O2 Device (Oxygen Therapy): nasal cannula    No intake or output data in the 24 hours ending 09/30/19 1553    Lines/Drains/Airways     Drain                 Hemodialysis AV Fistula 08/08/19 0214 Right upper arm 53 days          Peripheral Intravenous Line                 Peripheral IV - Single Lumen -- days         Peripheral IV - Single Lumen 09/29/19 1127 20 G;1 3/4 in Left Upper Arm 1 day                 Scheduled Meds:   sodium chloride 0.9%   Intravenous Once    aspirin  81 mg Oral Daily    calcitRIOL  0.25 mcg Oral Daily    doxycycline  100 mg Oral Q12H    enoxaparin  1 mg/kg Subcutaneous Daily    fluticasone furoate-vilanterol  1 puff Inhalation Daily    ipratropium  0.5 mg Nebulization Q6H    isosorbide mononitrate  30 mg Oral Daily    levalbuterol  1.25 mg Nebulization Q6H    losartan  25 mg Oral Daily    magnesium oxide  400 mg Oral Daily    methylPREDNISolone sodium succinate  40 mg Intravenous Q8H    metoprolol succinate  50 mg Oral Daily    mupirocin   Nasal BID    [START ON 10/1/2019] predniSONE  40 mg Oral Daily    senna-docusate 8.6-50 mg  1 tablet Oral BID    sevelamer HCl  800 mg Oral TID WM    sodium thiosulfate  12.5 g Intravenous Every Mon, Wed, Fri     Continuous Infusions:   dilTIAZem 5 mg/hr (09/30/19 1056)     PRN Meds:.sodium chloride 0.9%, acetaminophen, ondansetron, oxyCODONE-acetaminophen, sodium chloride 0.9%     Physical Exam   HEENT: Normocephalic, atraumatic, PERRL, Conjunctiva pink, no scleral icterus.   CVS: S1S2+, Irregular, +SM, no rubs or gallops, JVP: Normal.  LUNGS: Wheezing.  ABDOMEN: Soft, NT, BS+  EXTREMITIES: No cyanosis, clubbing or edema  NEURO: AAO X 3.         Significant Labs:   BMP:   Recent Labs   Lab 09/29/19  1049 09/29/19  2304 09/30/19  1130   *  --  200*     --  137   K 3.9  --  4.1   CL 90*  --  89*   CO2 29  --  26   BUN 34*  --  45*   CREATININE 5.7* 6.4* 6.6*   CALCIUM 7.6*  --  7.3*   MG 2.1  --  2.1   , CMP   Recent Labs   Lab 09/29/19  1049 09/29/19  2304 09/30/19  1130     --  137   K 3.9  --  4.1   CL 90*  --  89*   CO2 29  --  26   *  --  200*   BUN 34*  --  45*   CREATININE 5.7* 6.4* 6.6*   CALCIUM 7.6*  --  7.3*   PROT 7.7  --   --    ALBUMIN 2.9*  --   --    BILITOT 0.7  --   --    ALKPHOS 164*  --   --    AST 33  --   --    ALT 24  --   --    ANIONGAP 20*  --  22*   ESTGFRAFRICA 7.9* 6.8* 6.6*    EGFRNONAA 6.8* 5.9* 5.7*   , CBC   Recent Labs   Lab 09/29/19  1049 09/30/19  1130   WBC 5.96 7.16   HGB 9.3* 8.3*   HCT 28.9* 25.2*    219   , INR   Recent Labs   Lab 09/29/19  1049   INR 1.1   , Lipid Panel No results for input(s): CHOL, HDL, LDLCALC, TRIG, CHOLHDL in the last 48 hours.,   Pathology Results  (Last 10 years)    None      , Troponin   Recent Labs   Lab 09/29/19  1049 09/29/19  2304   TROPONINI 0.046* 0.041*    and All pertinent lab results from the last 24 hours have been reviewed.    Significant Imaging: X-Ray: CXR: X-Ray Chest 1 View (CXR): No results found for this visit on 09/29/19.  I have reviewed the imaging all significant imaging for the last 24 hours.    Assessment and Plan:     IMPRESSION:  1. Altered mental status. Secondary to ativan. Improved.  2. Congestive heart failure. Chronic. LVEF ~40-45% on last echo 7/2019. Euvolemic on exam.   3. COPD exacerbation. Still with active wheezing.  4. Calciphlyaxis. Reportedly better.   5. Atrial fibrillation. Valvular. Persistent. Patient does not wish to be on any oral anticoagulation or to be cardioverted. Currently with RVR. On cardizem GTT.   6. Hypertension. Better controlled at present. Elevates with agitation. Periods of hypotension. Seems better.   7. s/p MVR via right thoracotomy approach on 3/18/19 with Dr. Lora. Functioning appropriately per last echo.   8. CAD. Nonobstructive on C done on 3/13/2019.   9. End-stage renal disease on hemodialysis MWF. Anuric. Followed by Dr. Ingram.   10. Chronic tobacco abuse.      PLAN:  1. Increase Cardizem to 10mg/hr.  2. Will add her home med of Cardizem 60 mg PO TID.   3. Continue current regimen otherwise.       Ramila Bloom, KANE  Cardiology  [facility]      I have personally seen and examined the patient. I reviewed the notes, assessments, and/or procedures performed by Ms Ramila Bloom, I concur with her documentation of Griselda Neely.

## 2019-09-30 NOTE — PLAN OF CARE
"   09/30/19 0915   CORTEZ Message   Medicare Outpatient and Observation Notification regarding financial responsibility Given to patient/caregiver;Explained to patient/caregiver;Signed/date by patient/caregiver  (Pt's requested that her sister Elvi Hi sign form, as she has difficulty signing due to "shaking")   Date CORTEZ was signed 09/30/19   Time CORTEZ was signed 0915     "

## 2019-10-01 VITALS
BODY MASS INDEX: 22.41 KG/M2 | SYSTOLIC BLOOD PRESSURE: 170 MMHG | HEIGHT: 65 IN | RESPIRATION RATE: 19 BRPM | DIASTOLIC BLOOD PRESSURE: 89 MMHG | HEART RATE: 68 BPM | OXYGEN SATURATION: 99 % | TEMPERATURE: 98 F | WEIGHT: 134.5 LBS

## 2019-10-01 PROBLEM — I48.91 ATRIAL FIBRILLATION WITH RAPID VENTRICULAR RESPONSE: Status: RESOLVED | Noted: 2017-07-18 | Resolved: 2019-10-01

## 2019-10-01 PROBLEM — I50.43 ACUTE ON CHRONIC COMBINED SYSTOLIC AND DIASTOLIC CONGESTIVE HEART FAILURE: Status: RESOLVED | Noted: 2019-09-29 | Resolved: 2019-10-01

## 2019-10-01 PROBLEM — J44.1 ACUTE EXACERBATION OF CHRONIC OBSTRUCTIVE PULMONARY DISEASE (COPD): Status: RESOLVED | Noted: 2019-09-29 | Resolved: 2019-10-01

## 2019-10-01 LAB
ANION GAP SERPL CALC-SCNC: 21 MMOL/L (ref 8–16)
BASOPHILS # BLD AUTO: 0 K/UL (ref 0–0.2)
BASOPHILS NFR BLD: 0 % (ref 0–1.9)
BUN SERPL-MCNC: 37 MG/DL (ref 8–23)
CALCIUM SERPL-MCNC: 7.6 MG/DL (ref 8.7–10.5)
CHLORIDE SERPL-SCNC: 92 MMOL/L (ref 95–110)
CO2 SERPL-SCNC: 25 MMOL/L (ref 23–29)
CREAT SERPL-MCNC: 4.2 MG/DL (ref 0.5–1.4)
DIFFERENTIAL METHOD: ABNORMAL
EOSINOPHIL # BLD AUTO: 0 K/UL (ref 0–0.5)
EOSINOPHIL NFR BLD: 0 % (ref 0–8)
ERYTHROCYTE [DISTWIDTH] IN BLOOD BY AUTOMATED COUNT: 18.6 % (ref 11.5–14.5)
EST. GFR  (AFRICAN AMERICAN): 11.4 ML/MIN/1.73 M^2
EST. GFR  (NON AFRICAN AMERICAN): 9.9 ML/MIN/1.73 M^2
GLUCOSE SERPL-MCNC: 132 MG/DL (ref 70–110)
HCT VFR BLD AUTO: 28.4 % (ref 37–48.5)
HGB BLD-MCNC: 8.9 G/DL (ref 12–16)
IMM GRANULOCYTES # BLD AUTO: 0.12 K/UL (ref 0–0.04)
IMM GRANULOCYTES NFR BLD AUTO: 1 % (ref 0–0.5)
LYMPHOCYTES # BLD AUTO: 0.3 K/UL (ref 1–4.8)
LYMPHOCYTES NFR BLD: 2.8 % (ref 18–48)
MAGNESIUM SERPL-MCNC: 2 MG/DL (ref 1.6–2.6)
MCH RBC QN AUTO: 28.3 PG (ref 27–31)
MCHC RBC AUTO-ENTMCNC: 31.3 G/DL (ref 32–36)
MCV RBC AUTO: 90 FL (ref 82–98)
MONOCYTES # BLD AUTO: 0.5 K/UL (ref 0.3–1)
MONOCYTES NFR BLD: 4.2 % (ref 4–15)
NEUTROPHILS # BLD AUTO: 11 K/UL (ref 1.8–7.7)
NEUTROPHILS NFR BLD: 92 % (ref 38–73)
NRBC BLD-RTO: 0 /100 WBC
PHOSPHATE SERPL-MCNC: 4.2 MG/DL (ref 2.7–4.5)
PLATELET # BLD AUTO: 263 K/UL (ref 150–350)
PMV BLD AUTO: 10.8 FL (ref 9.2–12.9)
POTASSIUM SERPL-SCNC: 4 MMOL/L (ref 3.5–5.1)
RBC # BLD AUTO: 3.14 M/UL (ref 4–5.4)
SODIUM SERPL-SCNC: 138 MMOL/L (ref 136–145)
WBC # BLD AUTO: 11.94 K/UL (ref 3.9–12.7)

## 2019-10-01 PROCEDURE — 83735 ASSAY OF MAGNESIUM: CPT

## 2019-10-01 PROCEDURE — 25000003 PHARM REV CODE 250: Performed by: NURSE PRACTITIONER

## 2019-10-01 PROCEDURE — 84100 ASSAY OF PHOSPHORUS: CPT

## 2019-10-01 PROCEDURE — 80048 BASIC METABOLIC PNL TOTAL CA: CPT

## 2019-10-01 PROCEDURE — 27000221 HC OXYGEN, UP TO 24 HOURS

## 2019-10-01 PROCEDURE — 63600175 PHARM REV CODE 636 W HCPCS: Performed by: FAMILY MEDICINE

## 2019-10-01 PROCEDURE — 99900035 HC TECH TIME PER 15 MIN (STAT)

## 2019-10-01 PROCEDURE — 25000242 PHARM REV CODE 250 ALT 637 W/ HCPCS: Performed by: FAMILY MEDICINE

## 2019-10-01 PROCEDURE — 97165 OT EVAL LOW COMPLEX 30 MIN: CPT

## 2019-10-01 PROCEDURE — 94640 AIRWAY INHALATION TREATMENT: CPT

## 2019-10-01 PROCEDURE — 36415 COLL VENOUS BLD VENIPUNCTURE: CPT

## 2019-10-01 PROCEDURE — 94660 CPAP INITIATION&MGMT: CPT

## 2019-10-01 PROCEDURE — 97530 THERAPEUTIC ACTIVITIES: CPT

## 2019-10-01 PROCEDURE — 25000003 PHARM REV CODE 250: Performed by: INTERNAL MEDICINE

## 2019-10-01 PROCEDURE — 85025 COMPLETE CBC W/AUTO DIFF WBC: CPT

## 2019-10-01 PROCEDURE — 94761 N-INVAS EAR/PLS OXIMETRY MLT: CPT

## 2019-10-01 RX ORDER — DOXYCYCLINE 100 MG/1
100 CAPSULE ORAL EVERY 12 HOURS
Qty: 10 CAPSULE | Refills: 0 | Status: SHIPPED | OUTPATIENT
Start: 2019-10-01 | End: 2019-10-06

## 2019-10-01 RX ORDER — PREDNISONE 10 MG/1
TABLET ORAL
Qty: 30 TABLET | Refills: 0 | Status: ON HOLD | OUTPATIENT
Start: 2019-10-01 | End: 2019-10-22 | Stop reason: HOSPADM

## 2019-10-01 RX ORDER — SEVELAMER HYDROCHLORIDE 800 MG/1
800 TABLET, FILM COATED ORAL
Qty: 90 TABLET | Refills: 0 | Status: SHIPPED | OUTPATIENT
Start: 2019-10-01 | End: 2020-02-02

## 2019-10-01 RX ADMIN — DOXYCYCLINE HYCLATE 100 MG: 100 CAPSULE ORAL at 09:10

## 2019-10-01 RX ADMIN — DILTIAZEM HYDROCHLORIDE 60 MG: 60 TABLET, FILM COATED ORAL at 06:10

## 2019-10-01 RX ADMIN — MAGNESIUM OXIDE 400 MG: 400 TABLET ORAL at 09:10

## 2019-10-01 RX ADMIN — PREDNISONE 40 MG: 20 TABLET ORAL at 09:10

## 2019-10-01 RX ADMIN — CALCITRIOL CAPSULES 0.25 MCG 0.25 MCG: 0.25 CAPSULE ORAL at 09:10

## 2019-10-01 RX ADMIN — LEVALBUTEROL HYDROCHLORIDE 1.25 MG: 1.25 SOLUTION, CONCENTRATE RESPIRATORY (INHALATION) at 07:10

## 2019-10-01 RX ADMIN — LOSARTAN POTASSIUM 25 MG: 25 TABLET, FILM COATED ORAL at 09:10

## 2019-10-01 RX ADMIN — ASPIRIN 81 MG 81 MG: 81 TABLET ORAL at 09:10

## 2019-10-01 RX ADMIN — SENNOSIDES AND DOCUSATE SODIUM 1 TABLET: 8.6; 5 TABLET ORAL at 09:10

## 2019-10-01 RX ADMIN — DILTIAZEM HYDROCHLORIDE 10 MG/HR: 5 INJECTION INTRAVENOUS at 01:10

## 2019-10-01 RX ADMIN — RENAGEL 800 MG: 800 TABLET ORAL at 07:10

## 2019-10-01 RX ADMIN — METOPROLOL SUCCINATE 50 MG: 25 TABLET, FILM COATED, EXTENDED RELEASE ORAL at 09:10

## 2019-10-01 RX ADMIN — FLUTICASONE FUROATE AND VILANTEROL TRIFENATATE 1 PUFF: 100; 25 POWDER RESPIRATORY (INHALATION) at 07:10

## 2019-10-01 RX ADMIN — IPRATROPIUM BROMIDE 0.5 MG: 0.5 SOLUTION RESPIRATORY (INHALATION) at 07:10

## 2019-10-01 RX ADMIN — ISOSORBIDE MONONITRATE 30 MG: 30 TABLET, EXTENDED RELEASE ORAL at 09:10

## 2019-10-01 NOTE — DISCHARGE SUMMARY
[facility] Medicine  Discharge Summary      Patient Name: Griselda Neely  MRN: [id number]  Admission Date: 9/29/2019  Hospital Length of Stay: 1 days  Discharge Date and Time:  10/01/2019 2:32 PM  Attending Physician: Jhoan Fuentes MD   Discharging Provider: Jhoan Fuentes MD  Primary Care Provider: Kalie Neely MD      HPI:   Ms. Neely presents today with complaints of SOB. It is severe. It is associated with cough, but feels unable to cough anything up, and increased wheezing. She denies fever, chills, N/V/D, dizziness, or LOC. She reports symptoms began on Friday and have gotten progressively worse. She has a history of ESRD on HD MWF with calciphylaxis, afib not on anticoagulation, COPD, and recurrent encephalopathy. She was recently admitted earlier this month for pneumonia, but has completed her antibiotics. She is currently AAOx4. She continues to smoke cigarettes. She is off of they BiPAP at the time of exam, but began feeling SOB, her pulse ox remained at 97% throughout this, but RT was called and BiPAP re applied. She expresses her wishes to be a DNR.    * No surgery found *      Hospital Course:   Admitted for acute COPD exacerbation and developed A. fib with RVR. Started on Cardizem drip and cardiology adjusting medications. COPD seems to be improving now breathing on 3 L home oxygen. She uses oxygen when necessary at home, typically 3 L. Patient was weaned off cardizem. Patient was stable at discharge with the shortness follow-up with nephrology, cardiology, PCP.    General: Patient resting comfortably in no acute distress. Appears as stated age. Calm  Eyes: EOM intact. No conjunctivae injection. No scleral icterus.  ENT: Hearing grossly intact. No discharge from ears. No nasal discharge.   CVS: IRR. No LE edema BL.  Lungs: CTA BL, no wheezing or crackles. Good breath sounds. No accessory muscle use. No acute respiratory distress  Neuro: AOx3. GCS 15. Cranial  nerves grossly intact. Moves all extremities equally. Follows commands. Responds appropriately      Consults:   Consults (From admission, onward)        Status Ordering Provider     Inpatient consult to Cardiology  Once     Provider:  Katharina Powell MD    Acknowledged KEVIN GARCIA     Inpatient consult to Nephrology  Once     Provider:  Joseph Ingram MD    Completed KEVIN GARCIA     Inpatient consult to Nephrology  Once     Provider:  Dane Martinez MD    Acknowledged DANE MARTINEZ     IP consult to case management  Once     Provider:  (Not yet assigned)    Completed GREG ARAGON          No new Assessment & Plan notes have been filed under this hospital service since the last note was generated.  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 [E83.59, L97.922] 07/20/2017 Yes    ESRD (end stage renal disease) [N18.6] 05/11/2016 Yes    HTN (hypertension) [I10] 08/15/2013 Yes      Problems Resolved During this Admission:    Diagnosis Date Noted Date Resolved POA    PRINCIPAL PROBLEM:  Acute exacerbation of chronic obstructive pulmonary disease (COPD) [J44.1] 09/29/2019 10/01/2019 Yes    Atrial fibrillation with rapid ventricular response [I48.91] 07/18/2017 10/01/2019 Yes    Acute on chronic combined systolic and diastolic congestive heart failure [I50.43] 09/29/2019 10/01/2019 Yes       Discharged Condition: stable    Disposition: Home or Self Care    Follow Up:  Follow-up Information     Kalie Neely MD In 1 week.    Specialty:  Family Medicine  Why:  For check up s/p hospital discharge  Contact information:  [address]  [address]  [place] [address]  [phone]             Katharina Powell MD In 2 weeks.    Specialties:  Cardiovascular Disease, Cardiology  Contact  information:  [address]  [address]  [place] [address]  [phone]                 Patient Instructions:      Diet renal     Notify your health care provider if you experience any of the following:  temperature >100.4     Notify your health care provider if you experience any of the following:  persistent nausea and vomiting or diarrhea     Notify your health care provider if you experience any of the following:  severe uncontrolled pain     Notify your health care provider if you experience any of the following:  redness, tenderness, or signs of infection (pain, swelling, redness, odor or green/yellow discharge around incision site)     Notify your health care provider if you experience any of the following:  difficulty breathing or increased cough     Notify your health care provider if you experience any of the following:  severe persistent headache     Notify your health care provider if you experience any of the following:  worsening rash     Notify your health care provider if you experience any of the following:  persistent dizziness, light-headedness, or visual disturbances     Notify your health care provider if you experience any of the following:  increased confusion or weakness     Activity as tolerated       Significant Diagnostic Studies: Labs:   CMP   Recent Labs   Lab 09/29/19  2304 09/30/19  1130 10/01/19  0552   NA  --  137 138   K  --  4.1 4.0   CL  --  89* 92*   CO2  --  26 25   GLU  --  200* 132*   BUN  --  45* 37*   CREATININE 6.4* 6.6* 4.2*   CALCIUM  --  7.3* 7.6*   ANIONGAP  --  22* 21*   ESTGFRAFRICA 6.8* 6.6* 11.4*   EGFRNONAA 5.9* 5.7* 9.9*    and CBC   Recent Labs   Lab 09/30/19  1130 10/01/19  0552   WBC 7.16 11.94   HGB 8.3* 8.9*   HCT 25.2* 28.4*    263       Pending Diagnostic Studies:     Procedure Component Value Units Date/Time    EKG 12-lead [[id number]] Collected:  09/30/19 0631    Order Status:  Sent Lab Status:  In process Updated:  09/30/19 0719    Narrative:        Test Reason :     Vent. Rate : 099 BPM     Atrial Rate : 072 BPM     P-R Int : 000 ms          QRS Dur : 090 ms      QT Int : 384 ms       P-R-T Axes : 000 -16 128 degrees     QTc Int : 492 ms    Atrial fibrillation  Nonspecific T wave abnormality  Prolonged QT  Abnormal ECG  When compared with ECG of 29-SEP-2019 10:21,  No significant change was found    Referred By: AAAREFERR   SELF           Confirmed By:          Medications:  Reconciled Home Medications:      Medication List      START taking these medications    doxycycline 100 MG Cap  Commonly known as:  VIBRAMYCIN  Take 1 capsule (100 mg total) by mouth every 12 (twelve) hours. for 5 days     predniSONE 10 MG tablet  Commonly known as:  DELTASONE  Take 30 mg (3 tabs) x5 days, take 20 mg (2 tabs) x5 days, take 10 mg (1 tab) x5 days     sevelamer HCl 800 MG Tab  Commonly known as:  RENAGEL  Take 1 tablet (800 mg total) by mouth 3 (three) times daily with meals.  Replaces:  sevelamer carbonate 800 mg Tab        CHANGE how you take these medications    diltiaZEM 60 MG tablet  Commonly known as:  CARDIZEM  Take 60 mg by mouth 3 (three) times daily.  What changed:  Another medication with the same name was removed. Continue taking this medication, and follow the directions you see here.        CONTINUE taking these medications    aspirin 81 MG Chew  Take 81 mg by mouth once daily.     calcium acetate 667 mg capsule  Commonly known as:  PHOSLO  Take 1,334 mg by mouth 3 (three) times daily with meals.     fluticasone furoate-vilanterol 100-25 mcg/dose diskus inhaler  Commonly known as:  BREO  Inhale 1 puff into the lungs once daily. Controller     isosorbide mononitrate 30 MG 24 hr tablet  Commonly known as:  IMDUR  Take 30 mg by mouth once daily.     losartan 25 MG tablet  Commonly known as:  COZAAR  Take 25 mg by mouth once daily.     magnesium oxide 400 mg (241.3 mg magnesium) tablet  Commonly known as:  MAG-OX  Take 400 mg by mouth once daily.     metoprolol  succinate 50 MG 24 hr tablet  Commonly known as:  TOPROL-XL  Take 1 tablet (50 mg total) by mouth once daily.     ondansetron 4 MG tablet  Commonly known as:  ZOFRAN  Take 4 mg by mouth every 6 (six) hours as needed for Nausea.     oxyCODONE-acetaminophen 5-325 mg per tablet  Commonly known as:  PERCOCET  Take 1 tablet by mouth daily as needed for Pain (with dialysis).     traMADol 50 mg tablet  Commonly known as:  ULTRAM  Take 50 mg by mouth 3 (three) times a week. 3 TIMES A WEEK AS NEEDED DURING HEMODIALYSIS        STOP taking these medications    sevelamer carbonate 800 mg Tab  Commonly known as:  RENVELA  Replaced by:  sevelamer HCl 800 MG Tab            Indwelling Lines/Drains at time of discharge:   Lines/Drains/Airways     Drain                 Hemodialysis AV Fistula 08/08/19 0214 Right upper arm 54 days                Time spent on the discharge of patient: 35 minutes  Patient was seen and examined on the date of discharge and determined to be suitable for discharge.         Jhoan Fuentes MD  Department of Hospital Medicine  Formerly [facility]  Date of service: 10/01/2019 2:32 PM

## 2019-10-01 NOTE — PLAN OF CARE
10/01/19 0716   Patient Assessment/Suction   Level of Consciousness (AVPU) alert   Respiratory Effort Normal;Unlabored   Expansion/Accessory Muscles/Retractions no retractions;no use of accessory muscles   All Lung Fields Breath Sounds diminished;coarse   PRE-TX-O2   O2 Device (Oxygen Therapy) nasal cannula   $ Is the patient on Low Flow Oxygen? Yes   Flow (L/min) 3   SpO2 97 %   Pulse Oximetry Type Continuous   $ Pulse Oximetry - Multiple Charge Pulse Oximetry - Multiple   Pulse 99   Resp 18   Aerosol Therapy   $ Aerosol Therapy Charges Aerosol Treatment   Daily Review of Necessity (SVN) completed   Respiratory Treatment Status (SVN) given   Treatment Route (SVN) mask   Patient Position (SVN) semi-Clark's   Post Treatment Assessment (SVN) breath sounds unchanged   Signs of Intolerance (SVN) none   Inhaler   $ Inhaler Charges MDI (Metered Dose Inahler) Treatment   Daily Review of Necessity (Inhaler) completed   Respiratory Treatment Status (Inhaler) given   Treatment Route (Inhaler) mouthpiece   Patient Position (Inhaler) semi-Clark's   Post Treatment Assessment (Inhaler) breath sounds unchanged   Signs of Intolerance (Inhaler) none   Breath Sounds Post-Respiratory Treatment   Post-treatment Heart Rate (beats/min) 100   Post-treatment Resp Rate (breaths/min) 18

## 2019-10-01 NOTE — PROGRESS NOTES
10/01/19 1200   Room Air SpO2 At Rest   Room Air SpO2 At Rest   (96)   Exertional SpO2 Evaluation on Room Air   Room Air SpO2 on Exertion (!) 88 %   Exertional SpO2 Evaluation on O2   SpO2 During Exertion on O2 98 %   Exertion O2 LPM 2 LPM

## 2019-10-01 NOTE — PLAN OF CARE
09/30/19 1908   Patient Assessment/Suction   Level of Consciousness (AVPU) alert   Respiratory Effort Normal;Unlabored   Expansion/Accessory Muscles/Retractions no use of accessory muscles   All Lung Fields Breath Sounds diminished;crackles   Cough Frequency infrequent   Cough Type nonproductive   PRE-TX-O2   O2 Device (Oxygen Therapy) nasal cannula   Flow (L/min) 3.5   SpO2 (!) 92 %   Pulse Oximetry Type Continuous   Pulse (!) 124   Resp (!) 22   Aerosol Therapy   $ Aerosol Therapy Charges Aerosol Treatment   Daily Review of Necessity (SVN) completed   Respiratory Treatment Status (SVN) given   Treatment Route (SVN) mask   Patient Position (SVN) Clark's   Post Treatment Assessment (SVN) increased aeration   Signs of Intolerance (SVN) none   Breath Sounds Post-Respiratory Treatment   Throughout All Fields Post-Treatment All Fields   Throughout All Fields Post-Treatment aeration increased   Post-treatment Heart Rate (beats/min) 100   Post-treatment Resp Rate (breaths/min) 20   Preset CPAP/BiPAP Settings   Mode Of Delivery BiPAP  (on s/b)   change to q6w.a. And prn/improved edema and sob

## 2019-10-01 NOTE — PROGRESS NOTES
[facility]  Nephrology  Progress Note    Patient Name: Griselda Neely  MRN: [id number]  Admission Date: 9/29/2019  Hospital Length of Stay: 1 days  Attending Provider: Jhoan Fuentes MD   Primary Care Physician: Kalie Neely MD  Principal Problem:Acute exacerbation of chronic obstructive pulmonary disease (COPD)      Subjective:     Interval History: feeling better this am; SoB/cough improved; s/p HD on 9/30/19 (net UF: approx: 3L)    Review of patient's allergies indicates:  No Known Allergies  Current Facility-Administered Medications   Medication Frequency    0.9%  NaCl infusion PRN    0.9%  NaCl infusion Once    acetaminophen tablet 650 mg Q4H PRN    aspirin chewable tablet 81 mg Daily    calcitRIOL capsule 0.25 mcg Daily    diltiaZEM 125 mg in dextrose 5% 125 mL infusion (non-titrating) Continuous    diltiaZEM tablet 60 mg Q8H    doxycycline capsule 100 mg Q12H    enoxaparin injection 50 mg Daily    fluticasone furoate-vilanterol 100-25 mcg/dose diskus inhaler 1 puff Daily    ipratropium 0.02 % nebulizer solution 0.5 mg Q6H WAKE    isosorbide mononitrate 24 hr tablet 30 mg Daily    levalbuterol nebulizer solution 1.25 mg Q6H WAKE    levalbuterol nebulizer solution 1.25 mg Q6H PRN    losartan tablet 25 mg Daily    magnesium oxide tablet 400 mg Daily    metoprolol succinate (TOPROL-XL) 24 hr tablet 50 mg Daily    mupirocin 2 % ointment BID    ondansetron injection 4 mg Q8H PRN    oxyCODONE-acetaminophen 5-325 mg per tablet 1 tablet Q6H PRN    predniSONE tablet 40 mg Daily    senna-docusate 8.6-50 mg per tablet 1 tablet BID    sevelamer HCl tablet 800 mg TID WM    sodium chloride 0.9% flush 10 mL PRN    sodium thiosulfate 12.5 gram/50 mL (250 mg/mL) injection 12.5 g Every Mon, Wed, Fri       Objective:     Vital Signs (Most Recent):  Temp: 97.6 °F (36.4 °C) (10/01/19 0453)  Pulse: (!) 128 (10/01/19 0453)  Resp: 20 (10/01/19 0016)  BP: (!) 139/98 (10/01/19  0016)  SpO2: (!) 94 % (10/01/19 0453)  O2 Device (Oxygen Therapy): nasal cannula (09/30/19 1928) Vital Signs (24h Range):  Temp:  [97.6 °F (36.4 °C)-99.5 °F (37.5 °C)] 97.6 °F (36.4 °C)  Pulse:  [107-152] 128  Resp:  [16-24] 20  SpO2:  [92 %-99 %] 94 %  BP: (123-167)/() 139/98     Weight: 61 kg (134 lb 7.7 oz) (09/30/19 0500)  Body mass index is 22.38 kg/m².  Body surface area is 1.67 meters squared.    I/O last 3 completed shifts:  In: 500 [Other:500]  Out: 3500 [Other:3500]    Physical Exam   Constitutional: She is oriented to person, place, and time. No distress.   Frail, chronically ill-appearing   HENT:   Head: Normocephalic and atraumatic.   Mouth/Throat: Oropharynx is clear and moist.   B temporal wasting     Eyes: Right eye exhibits no discharge. Left eye exhibits no discharge. No scleral icterus.   B conjunctival injection (mild)   Neck: Normal range of motion. Neck supple. No JVD present.   Cardiovascular: Normal rate, regular rhythm and intact distal pulses. Exam reveals no gallop and no friction rub.   Murmur heard.  Pulmonary/Chest: Effort normal. No respiratory distress. She has wheezes. She exhibits no tenderness.   Coarse, diffuse BS B w/ inspiratory rhonchi & occasional, end-expiratory wheezes over R base   Abdominal: Soft. She exhibits no distension and no mass. There is no tenderness.   Hypoactive BS in all quadrants   Genitourinary:   Genitourinary Comments: Deferred   Musculoskeletal: Normal range of motion. She exhibits no edema or tenderness.   R UE AV Fistula w/ good thrill   Neurological: She is alert and oriented to person, place, and time. No cranial nerve deficit.   Skin: Skin is warm and dry. She is not diaphoretic. No erythema.   Psychiatric: She has a normal mood and affect. Her behavior is normal.   Nursing note and vitals reviewed.      Significant Labs:sure  ABGs: No results for input(s): PH, PCO2, HCO3, POCSATURATED, BE in the last 168 hours.  Cardiac Markers: No results for  input(s): CKMB, TROPONINT, MYOGLOBIN in the last 168 hours.  CBC:   Recent Labs   Lab 09/30/19  1130   WBC 7.16   RBC 2.86*   HGB 8.3*   HCT 25.2*      MCV 88   MCH 29.0   MCHC 32.9     CMP:   Recent Labs   Lab 09/29/19  1049  09/30/19  1130   *  --  200*   CALCIUM 7.6*  --  7.3*   ALBUMIN 2.9*  --   --    PROT 7.7  --   --      --  137   K 3.9  --  4.1   CO2 29  --  26   CL 90*  --  89*   BUN 34*  --  45*   CREATININE 5.7*   < > 6.6*   ALKPHOS 164*  --   --    ALT 24  --   --    AST 33  --   --    BILITOT 0.7  --   --     < > = values in this interval not displayed.       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 ESRD (HD-MWF via R UE AV Fistula)- clinically stable at present; no overt volume overload, uremic signs/symptoms, electrolyte perturbations nor acid-base disturbance; no active issues    -maintenance HD (duration: 3h; UF Goal: 2-3L as tolerated;  Standard 'bath'; Access: AVF; Qb: 400 ml/min, Qd: 2x BFR; 12.5 gm of Na thiosulfate over last 30-60 min of treatment)) per outpatient schedule    -DAILY renal function panel to document sCr trend, optimize electrolyte 'bath' & assess response to therapy    -avoid nephrotoxic agents (NSAIDs, IV contrast dye, MRI w/ Gadolinium constrast)    -renally dose all  appropriate medications, including antibiotics     2. HTN- clinically stable at present; BP NOT AT GOAL (139/98); no active issues    -continue current anti-HTN drug regimen (GIVE BP MEDS POST HD on HD Days) + HD-associated UF to achieve & maintain goal BP (< 150/90)     3. ANEMIA- clinically stable at present; H/H NOT AT GOAL (8.3/25.2); no active issues    -trend DAILY CBC (H/H) to effect optimal blood-loss surveillance     4. SECONDARY HYPERPARATHYROIDISM (sHPT)-- clinically stable on current therapy (Na thiosulfate 12.5 gm x last 30-60 min of HD); no active issues    -continue dietary binder/Vitamin D +/- HD-associated calcimimetic therapy (Cinacalcet vs Etelcalcetide)     5. CHRONIC RESPIRATORY FAILURE w/ Acute Exacerbation- symptomatically improved on scheduled bronchodilator/IV steroid/supplemental O2 therapy; no active issues    -management per Primary team +/- Pulmonary Medicine service recommendations    Thank you for your consult. I will follow-up with patient. Please contact us if you have any additional questions.    Dane Martinez III, MD  [facility]  [facility]  [phone] (C)

## 2019-10-01 NOTE — PT/OT/SLP EVAL
Physical Therapy Evaluation and Discharge Note    Patient Name:  Griselda Neely   MRN:  [id number]    Recommendations:     Discharge Recommendations:  home   Discharge Equipment Recommendations: none   Barriers to discharge: None    Assessment:     Griselda Neely is a 74 y.o. female admitted with a medical diagnosis of Acute exacerbation of chronic obstructive pulmonary disease (COPD). .  At this time, patient is functioning at their prior level of function and does not require further acute PT services. Pt lives with sister. O2 assessment performed and documented. Pt requires 2L O2 for mobility.     Recent Surgery: * No surgery found *      Plan:     During this hospitalization, patient does not require further acute PT services.  Please re-consult if situation changes.      Subjective     Chief Complaint: I feel better  Patient/Family Comments/goals: to go home  Pain/Comfort:  · Pain Rating 1: 0/10    Patients cultural, spiritual, Hoahaoism conflicts given the current situation:      Living Environment:  Pt lives with sister in one story home. Son in town to assist pt as needed.   Prior to admission, patients level of function was mod ind with cane or rollator.  Equipment used at home: cane, straight, oxygen, rollator.  DME owned (not currently used): rolling walker.  Upon discharge, patient will have assistance from family.    Objective:     Communicated with rn prior to session.  Patient found up in chair with telemetry, peripheral IV upon PT entry to room.    General Precautions: Standard, respiratory, fall   Orthopedic Precautions:N/A   Braces: N/A     Exams:  · Gross Motor Coordination:  WFL  · RLE ROM: WFL  · RLE Strength: WFL  · LLE ROM: WFL  · LLE Strength: WFL    Functional Mobility:  · Transfers:     · Sit to Stand:  modified independence with rolling walker  · Gait: pt able to amb 150 ft RW supervision, O2 dropped to 88% during ambulation on room air, on2L O2 96%. RN notified     AM-PAC 6 CLICK  MOBILITY  Total Score:23       Therapeutic Activities and Exercises:   education, fall prevention,dc planning, O2 needs.     AM-PAC 6 CLICK MOBILITY  Total Score:23     Patient left up in chair with all lines intact, call button in reach, rn notified and pt reports understanding not to get up by self, to call staff. .    GOALS:   Multidisciplinary Problems     Physical Therapy Goals     Not on file                History:     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    both legs    CHF (congestive heart failure)     Encounter for blood transfusion 03/2016    Gout     Hypertension     Mitral valve regurgitation     Osteoarthritis     Pancreatitis     Peripheral vascular disease     Peritoneal dialysis catheter in place     Pneumonia 09/09/2017    Renal failure        Past Surgical History:   Procedure Laterality Date    ACHILLES TENDON SURGERY Right     APPENDECTOMY      CARDIAC CATHETERIZATION  07/03/2017    CHOLECYSTECTOMY      heal surgery Right     HYSTERECTOMY      PARATHYROIDECTOMY      PARATHYROIDECTOMY  07/13/2017    PERITONEAL CATHETER INSERTION      RENAL BIOPSY      vocal cord nodule         Time Tracking:     PT Received On: 10/01/19  PT Start Time: 1117     PT Stop Time: 1132  PT Total Time (min): 15 min     Billable Minutes: Evaluation 6 and Therapeutic Activity 9      Jackie Toro, PT  10/01/2019

## 2019-10-02 NOTE — PT/OT/SLP EVAL
"Occupational Therapy   Evaluation and Discharge Note    Name: Griselda Neely  MRN: [id number]  Admitting Diagnosis:  Acute exacerbation of chronic obstructive pulmonary disease (COPD)      Recommendations:     Discharge Recommendations: home  Discharge Equipment Recommendations:  none  Barriers to discharge:  None    Assessment:     Griselda Neely is a 74 y.o. female with a medical diagnosis of Acute exacerbation of chronic obstructive pulmonary disease (COPD). At this time, patient is functioning at their prior level of function and does not require further acute OT services.     Plan:     During this hospitalization, patient does not require further acute OT services.  Please re-consult if situation changes.    · Plan of Care Reviewed with: patient    Subjective     Chief Complaint: I am so tired of doing all this stuff, I just want t go home."  Patient/Family Comments/goals: To go home.    Occupational Profile:  Living Environment: pt. lives with sister, brother and son in [facility] with 3 steps with rails;walk n shower with shower chair.  Previous level of function: indep-Yin with ADLs; ambulates indep-yin with walker; O2 used PRN; shares IADLS with family.  Roles and Routines: active  Equipment Used at home:  bedside commode, shower chair, walker, rolling, rollator, wheelchair, oxygen, raised toilet  Assistance upon Discharge: family    Pain/Comfort:  Pain Rating 1: 0/10  Pain Rating Post-Intervention 1: 0/10    Patients cultural, spiritual, Anabaptist conflicts given the current situation: no    Objective:     Communicated with: nurse prior to session.  Patient found up in chair with telemetry, oxygen upon OT entry to room.    General Precautions: Standard, fall, respiratory   Orthopedic Precautions:N/A   Braces: N/A     Occupational Performance:    Functional Mobility/Transfers:  · Patient completed Sit <> Stand Transfer with independence  with  no assistive device   · Patient completed Toilet Transfer refused " to participate technique with N/A with  N/A  · Functional Mobility: refused    Activities of Daily Living:  · Feeding:  independence seated .  · Grooming: refused to perform, simulated indep .  · Lower Body Dressing: independence slippers  · Toileting: refused .    Cognitive/Visual Perceptual:  Cognitive/Psychosocial Skills:     -       Oriented to: Person, Place, Time and Situation   -       Follows Commands/attention:Follows multistep  commands  -       Communication: clear/fluent  -       Memory: No Deficits noted  -       Safety awareness/insight to disability: intact   -       Mood/Affect/Coping skills/emotional control: noncomplaint   Visual/Perceptual:      -Intact glasses    Physical Exam:  Balance:    -       good  Postural examination/scapula alignment:    -       No postural abnormalities identified  Dominant hand:    -       right  Upper Extremity Range of Motion:     -       Right Upper Extremity: WFL  -       Left Upper Extremity: WFL  Upper Extremity Strength:    -       Right Upper Extremity: WFL  -       Left Upper Extremity: WFL   Strength:    -       Right Upper Extremity: WFL  -       Left Upper Extremity: WFL    AMPAC 6 Click ADL:  AMPAC Total Score: 24    Treatment & Education:  Purpose of OT visit explained to pt and pt refused most of OT eval.  Education:    Patient left up in chair with all lines intact, call button in reach and nurse notified    GOALS:   Multidisciplinary Problems     Occupational Therapy Goals     Not on file                History:     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    both legs    CHF (congestive heart failure)     Encounter for blood transfusion 03/2016    Gout     Hypertension     Mitral valve regurgitation     Osteoarthritis     Pancreatitis     Peripheral vascular disease     Peritoneal dialysis catheter in place     Pneumonia 09/09/2017    Renal failure          Past Surgical History:   Procedure  Laterality Date    ACHILLES TENDON SURGERY Right     APPENDECTOMY      CARDIAC CATHETERIZATION  07/03/2017    CHOLECYSTECTOMY      heal surgery Right     HYSTERECTOMY      PARATHYROIDECTOMY      PARATHYROIDECTOMY  07/13/2017    PERITONEAL CATHETER INSERTION      RENAL BIOPSY      vocal cord nodule         Time Tracking:     OT Date of Treatment: 10/01/19  OT Start Time: 1426  OT Stop Time: 1433  OT Total Time (min): 7 min    Billable Minutes:Evaluation 7  Total Time 7    Martina Plaza OT  10/1/2019

## 2019-10-03 ENCOUNTER — OFFICE VISIT (OUTPATIENT)
Dept: FAMILY MEDICINE | Facility: CLINIC | Age: 74
End: 2019-10-03
Payer: MEDICARE

## 2019-10-03 VITALS
WEIGHT: 128 LBS | HEART RATE: 72 BPM | HEIGHT: 64 IN | BODY MASS INDEX: 21.85 KG/M2 | SYSTOLIC BLOOD PRESSURE: 158 MMHG | DIASTOLIC BLOOD PRESSURE: 78 MMHG

## 2019-10-03 DIAGNOSIS — G89.4 CHRONIC PAIN SYNDROME: ICD-10-CM

## 2019-10-03 DIAGNOSIS — Z99.2 ANEMIA DUE TO CHRONIC KIDNEY DISEASE, ON CHRONIC DIALYSIS: ICD-10-CM

## 2019-10-03 DIAGNOSIS — I27.20 PULMONARY HYPERTENSION: ICD-10-CM

## 2019-10-03 DIAGNOSIS — D63.1 ANEMIA DUE TO CHRONIC KIDNEY DISEASE, ON CHRONIC DIALYSIS: ICD-10-CM

## 2019-10-03 DIAGNOSIS — J44.9 CHRONIC OBSTRUCTIVE PULMONARY DISEASE, UNSPECIFIED COPD TYPE: ICD-10-CM

## 2019-10-03 DIAGNOSIS — I48.20 CHRONIC A-FIB: ICD-10-CM

## 2019-10-03 DIAGNOSIS — Z66 DNR (DO NOT RESUSCITATE): Chronic | ICD-10-CM

## 2019-10-03 DIAGNOSIS — I50.32 CHRONIC DIASTOLIC HEART FAILURE: ICD-10-CM

## 2019-10-03 DIAGNOSIS — K30 FUNCTIONAL DYSPEPSIA: ICD-10-CM

## 2019-10-03 DIAGNOSIS — N18.6 ESRD (END STAGE RENAL DISEASE): ICD-10-CM

## 2019-10-03 DIAGNOSIS — E83.59 CALCIPHYLAXIS: ICD-10-CM

## 2019-10-03 DIAGNOSIS — Z09 HOSPITAL DISCHARGE FOLLOW-UP: Primary | ICD-10-CM

## 2019-10-03 DIAGNOSIS — N18.6 ANEMIA DUE TO CHRONIC KIDNEY DISEASE, ON CHRONIC DIALYSIS: ICD-10-CM

## 2019-10-03 DIAGNOSIS — Z99.81 OXYGEN DEPENDENT: ICD-10-CM

## 2019-10-03 PROBLEM — Z90.89 S/P PARATHYROIDECTOMY: Status: ACTIVE | Noted: 2017-08-07

## 2019-10-03 PROBLEM — Z98.890 S/P PARATHYROIDECTOMY: Status: ACTIVE | Noted: 2017-08-07

## 2019-10-03 PROBLEM — J96.10 CHRONIC RESPIRATORY FAILURE: Status: ACTIVE | Noted: 2019-05-13

## 2019-10-03 PROBLEM — I25.10 CORONARY ARTERIOSCLEROSIS IN NATIVE ARTERY: Status: ACTIVE | Noted: 2017-04-04

## 2019-10-03 PROBLEM — G44.209 TENSION-TYPE HEADACHE: Status: ACTIVE | Noted: 2017-10-25

## 2019-10-03 PROBLEM — N25.81 HYPERPARATHYROIDISM DUE TO RENAL INSUFFICIENCY: Status: ACTIVE | Noted: 2017-01-04

## 2019-10-03 PROCEDURE — 99496 TRANSITIONAL CARE MANAGE SERVICE 7 DAY DISCHARGE: ICD-10-PCS | Mod: S$GLB,,, | Performed by: FAMILY MEDICINE

## 2019-10-03 PROCEDURE — 99496 TRANSJ CARE MGMT HIGH F2F 7D: CPT | Mod: S$GLB,,, | Performed by: FAMILY MEDICINE

## 2019-10-03 RX ORDER — ONDANSETRON 4 MG/1
4 TABLET, ORALLY DISINTEGRATING ORAL EVERY 6 HOURS PRN
Qty: 60 TABLET | Refills: 4 | Status: SHIPPED | OUTPATIENT
Start: 2019-10-03 | End: 2020-02-19 | Stop reason: SDUPTHER

## 2019-10-03 RX ORDER — TRAMADOL HYDROCHLORIDE 50 MG/1
50 TABLET ORAL
Qty: 90 TABLET | Refills: 3 | Status: ON HOLD | OUTPATIENT
Start: 2019-10-04 | End: 2020-04-23 | Stop reason: HOSPADM

## 2019-10-03 RX ORDER — ONDANSETRON 4 MG/1
4 TABLET, FILM COATED ORAL EVERY 6 HOURS PRN
Status: CANCELLED | OUTPATIENT
Start: 2019-10-03

## 2019-10-03 NOTE — PROGRESS NOTES
SUBJECTIVE:    Patient ID: Griselda Neely is a 74 y.o. female.    Chief Complaint: Hospital Follow Up    Patient with history of end-stage renal disease on dialysis, hypertensive cardiomyopathy and chronic congestive heart failure is bored in by family for follow-up visit.  She has had multiple hospitalizations over the past 3 years for heart and renal related symptoms.  Her latest admission was from 09/28 through 10/1 for shortness of breath that was determined to be in acute COPD exacerbation.  Acute chronic heart failure was also noted but symptoms appear to be more related to COPD.  She was placed on BiPAP and comfort measures were given.  Patient to establish herself as a DNR on that admission.  She had admission earlier in September for pneumonia and sepsis in had completed antibiotics.  Her CHF was treated by diuresis during dialysis.    Completing abx and steroids. Will be attending wound care for leg ulcers from calciphylaxis today     Family is here with her today  Medications are reviewed and reconciled. D/c summary reviewed  She does not have HH established, family is providing care. Follow ups with specialists are discussed  No labs pending and patient has establishes as a DNR      Admission on 09/29/2019, Discharged on 10/01/2019   Component Date Value Ref Range Status    WBC 09/29/2019 5.96  3.90 - 12.70 K/uL Final    RBC 09/29/2019 3.23* 4.00 - 5.40 M/uL Final    Hemoglobin 09/29/2019 9.3* 12.0 - 16.0 g/dL Final    Hematocrit 09/29/2019 28.9* 37.0 - 48.5 % Final    Mean Corpuscular Volume 09/29/2019 90  82 - 98 fL Final    Mean Corpuscular Hemoglobin 09/29/2019 28.8  27.0 - 31.0 pg Final    Mean Corpuscular Hemoglobin Conc 09/29/2019 32.2  32.0 - 36.0 g/dL Final    RDW 09/29/2019 18.4* 11.5 - 14.5 % Final    Platelets 09/29/2019 217  150 - 350 K/uL Final    MPV 09/29/2019 9.8  9.2 - 12.9 fL Final    Immature Granulocytes 09/29/2019 0.3  0.0 - 0.5 % Final    Gran # (ANC) 09/29/2019 4.6   1.8 - 7.7 K/uL Final    Immature Grans (Abs) 09/29/2019 0.02  0.00 - 0.04 K/uL Final    Lymph # 09/29/2019 0.9* 1.0 - 4.8 K/uL Final    Mono # 09/29/2019 0.4  0.3 - 1.0 K/uL Final    Eos # 09/29/2019 0.1  0.0 - 0.5 K/uL Final    Baso # 09/29/2019 0.02  0.00 - 0.20 K/uL Final    nRBC 09/29/2019 0  0 /100 WBC Final    Gran% 09/29/2019 76.8* 38.0 - 73.0 % Final    Lymph% 09/29/2019 15.1* 18.0 - 48.0 % Final    Mono% 09/29/2019 6.7  4.0 - 15.0 % Final    Eosinophil% 09/29/2019 0.8  0.0 - 8.0 % Final    Basophil% 09/29/2019 0.3  0.0 - 1.9 % Final    Differential Method 09/29/2019 Automated   Final    Sodium 09/29/2019 139  136 - 145 mmol/L Final    Potassium 09/29/2019 3.9  3.5 - 5.1 mmol/L Final    Chloride 09/29/2019 90* 95 - 110 mmol/L Final    CO2 09/29/2019 29  23 - 29 mmol/L Final    Glucose 09/29/2019 137* 70 - 110 mg/dL Final    BUN, Bld 09/29/2019 34* 8 - 23 mg/dL Final    Creatinine 09/29/2019 5.7* 0.5 - 1.4 mg/dL Final    Calcium 09/29/2019 7.6* 8.7 - 10.5 mg/dL Final    Total Protein 09/29/2019 7.7  6.0 - 8.4 g/dL Final    Albumin 09/29/2019 2.9* 3.5 - 5.2 g/dL Final    Total Bilirubin 09/29/2019 0.7  0.1 - 1.0 mg/dL Final    Alkaline Phosphatase 09/29/2019 164* 55 - 135 U/L Final    AST 09/29/2019 33  10 - 40 U/L Final    ALT 09/29/2019 24  10 - 44 U/L Final    Anion Gap 09/29/2019 20* 8 - 16 mmol/L Final    eGFR if African American 09/29/2019 7.9* >60 mL/min/1.73 m^2 Final    eGFR if non African American 09/29/2019 6.8* >60 mL/min/1.73 m^2 Final    Troponin I 09/29/2019 0.046* 0.020 - 0.040 ng/mL Final    BNP 09/29/2019 >4,500* 0 - 99 pg/mL Final    PT 09/29/2019 13.7  11.7 - 14.0 sec Final    INR 09/29/2019 1.1   Final    Magnesium 09/29/2019 2.1  1.6 - 2.6 mg/dL Final    Troponin I 09/29/2019 0.041* 0.020 - 0.040 ng/mL Final    Creatinine 09/29/2019 6.4* 0.5 - 1.4 mg/dL Final    eGFR if African American 09/29/2019 6.8* >60 mL/min/1.73 m^2 Final    eGFR if non African  American 09/29/2019 5.9* >60 mL/min/1.73 m^2 Final    Free T4 09/29/2019 0.79  0.71 - 1.51 ng/dL Final    TSH 09/29/2019 3.155  0.340 - 5.600 uIU/mL Final    Sodium 09/30/2019 137  136 - 145 mmol/L Final    Potassium 09/30/2019 4.1  3.5 - 5.1 mmol/L Final    Chloride 09/30/2019 89* 95 - 110 mmol/L Final    CO2 09/30/2019 26  23 - 29 mmol/L Final    Glucose 09/30/2019 200* 70 - 110 mg/dL Final    BUN, Bld 09/30/2019 45* 8 - 23 mg/dL Final    Creatinine 09/30/2019 6.6* 0.5 - 1.4 mg/dL Final    Calcium 09/30/2019 7.3* 8.7 - 10.5 mg/dL Final    Anion Gap 09/30/2019 22* 8 - 16 mmol/L Final    eGFR if African American 09/30/2019 6.6* >60 mL/min/1.73 m^2 Final    eGFR if non African American 09/30/2019 5.7* >60 mL/min/1.73 m^2 Final    Magnesium 09/30/2019 2.1  1.6 - 2.6 mg/dL Final    Phosphorus 09/30/2019 5.6* 2.7 - 4.5 mg/dL Final    WBC 09/30/2019 7.16  3.90 - 12.70 K/uL Final    RBC 09/30/2019 2.86* 4.00 - 5.40 M/uL Final    Hemoglobin 09/30/2019 8.3* 12.0 - 16.0 g/dL Final    Hematocrit 09/30/2019 25.2* 37.0 - 48.5 % Final    Mean Corpuscular Volume 09/30/2019 88  82 - 98 fL Final    Mean Corpuscular Hemoglobin 09/30/2019 29.0  27.0 - 31.0 pg Final    Mean Corpuscular Hemoglobin Conc 09/30/2019 32.9  32.0 - 36.0 g/dL Final    RDW 09/30/2019 18.1* 11.5 - 14.5 % Final    Platelets 09/30/2019 219  150 - 350 K/uL Final    MPV 09/30/2019 10.8  9.2 - 12.9 fL Final    Immature Granulocytes 09/30/2019 0.4  0.0 - 0.5 % Final    Gran # (ANC) 09/30/2019 6.6  1.8 - 7.7 K/uL Final    Immature Grans (Abs) 09/30/2019 0.03  0.00 - 0.04 K/uL Final    Lymph # 09/30/2019 0.3* 1.0 - 4.8 K/uL Final    Mono # 09/30/2019 0.3  0.3 - 1.0 K/uL Final    Eos # 09/30/2019 0.0  0.0 - 0.5 K/uL Final    Baso # 09/30/2019 0.01  0.00 - 0.20 K/uL Final    nRBC 09/30/2019 0  0 /100 WBC Final    Gran% 09/30/2019 92.2* 38.0 - 73.0 % Final    Lymph% 09/30/2019 3.8* 18.0 - 48.0 % Final    Mono% 09/30/2019 3.5* 4.0 - 15.0  % Final    Eosinophil% 09/30/2019 0.0  0.0 - 8.0 % Final    Basophil% 09/30/2019 0.1  0.0 - 1.9 % Final    Differential Method 09/30/2019 Automated   Final    Sodium 10/01/2019 138  136 - 145 mmol/L Final    Potassium 10/01/2019 4.0  3.5 - 5.1 mmol/L Final    Chloride 10/01/2019 92* 95 - 110 mmol/L Final    CO2 10/01/2019 25  23 - 29 mmol/L Final    Glucose 10/01/2019 132* 70 - 110 mg/dL Final    BUN, Bld 10/01/2019 37* 8 - 23 mg/dL Final    Creatinine 10/01/2019 4.2* 0.5 - 1.4 mg/dL Final    Calcium 10/01/2019 7.6* 8.7 - 10.5 mg/dL Final    Anion Gap 10/01/2019 21* 8 - 16 mmol/L Final    eGFR if African American 10/01/2019 11.4* >60 mL/min/1.73 m^2 Final    eGFR if non African American 10/01/2019 9.9* >60 mL/min/1.73 m^2 Final    Magnesium 10/01/2019 2.0  1.6 - 2.6 mg/dL Final    Phosphorus 10/01/2019 4.2  2.7 - 4.5 mg/dL Final    WBC 10/01/2019 11.94  3.90 - 12.70 K/uL Final    RBC 10/01/2019 3.14* 4.00 - 5.40 M/uL Final    Hemoglobin 10/01/2019 8.9* 12.0 - 16.0 g/dL Final    Hematocrit 10/01/2019 28.4* 37.0 - 48.5 % Final    Mean Corpuscular Volume 10/01/2019 90  82 - 98 fL Final    Mean Corpuscular Hemoglobin 10/01/2019 28.3  27.0 - 31.0 pg Final    Mean Corpuscular Hemoglobin Conc 10/01/2019 31.3* 32.0 - 36.0 g/dL Final    RDW 10/01/2019 18.6* 11.5 - 14.5 % Final    Platelets 10/01/2019 263  150 - 350 K/uL Final    MPV 10/01/2019 10.8  9.2 - 12.9 fL Final    Immature Granulocytes 10/01/2019 1.0* 0.0 - 0.5 % Final    Gran # (ANC) 10/01/2019 11.0* 1.8 - 7.7 K/uL Final    Immature Grans (Abs) 10/01/2019 0.12* 0.00 - 0.04 K/uL Final    Lymph # 10/01/2019 0.3* 1.0 - 4.8 K/uL Final    Mono # 10/01/2019 0.5  0.3 - 1.0 K/uL Final    Eos # 10/01/2019 0.0  0.0 - 0.5 K/uL Final    Baso # 10/01/2019 0.00  0.00 - 0.20 K/uL Final    nRBC 10/01/2019 0  0 /100 WBC Final    Gran% 10/01/2019 92.0* 38.0 - 73.0 % Final    Lymph% 10/01/2019 2.8* 18.0 - 48.0 % Final    Mono% 10/01/2019 4.2  4.0 -  15.0 % Final    Eosinophil% 10/01/2019 0.0  0.0 - 8.0 % Final    Basophil% 10/01/2019 0.0  0.0 - 1.9 % Final    Differential Method 10/01/2019 Automated   Final   No results displayed because visit has over 200 results.      No results displayed because visit has over 200 results.          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    both legs    CHF (congestive heart failure)     Encounter for blood transfusion 03/2016    Gout     Hypertension     Mitral valve regurgitation     Osteoarthritis     Pancreatitis     Peripheral vascular disease     Peritoneal dialysis catheter in place     Pneumonia 09/09/2017    Renal failure      Past Surgical History:   Procedure Laterality Date    ACHILLES TENDON SURGERY Right     APPENDECTOMY      CARDIAC CATHETERIZATION  07/03/2017    CHOLECYSTECTOMY      heal surgery Right     HYSTERECTOMY      PARATHYROIDECTOMY      PARATHYROIDECTOMY  07/13/2017    PERITONEAL CATHETER INSERTION      RENAL BIOPSY      vocal cord nodule       Family History   Problem Relation Age of Onset    Heart disease Sister     Early death Sister         heart as baby    Heart disease Maternal Grandfather     Diabetes Mother     Hypertension Mother     Heart failure Mother     Heart disease Mother     Arthritis Mother     Diabetes Father     Early death Sister         infant       Marital Status:   Alcohol History:  reports that she does not drink alcohol.  Tobacco History:  reports that she has been smoking. She has a 22.50 pack-year smoking history. She has never used smokeless tobacco.  Drug History:  reports that she does not use drugs.    Review of patient's allergies indicates:  No Known Allergies    Current Outpatient Medications:     aspirin 81 MG Chew, Take 81 mg by mouth once daily. , Disp: , Rfl:     calcium acetate (PHOSLO) 667 mg capsule, Take 1,334 mg by mouth 3 (three) times daily with meals., Disp: , Rfl:      diltiaZEM (CARDIZEM) 60 MG tablet, Take 60 mg by mouth 3 (three) times daily., Disp: , Rfl:     doxycycline (VIBRAMYCIN) 100 MG Cap, Take 1 capsule (100 mg total) by mouth every 12 (twelve) hours. for 5 days, Disp: 10 capsule, Rfl: 0    fluticasone furoate-vilanterol (BREO) 100-25 mcg/dose diskus inhaler, Inhale 1 puff into the lungs once daily. Controller, Disp: , Rfl:     isosorbide mononitrate (IMDUR) 30 MG 24 hr tablet, Take 30 mg by mouth once daily., Disp: , Rfl:     losartan (COZAAR) 25 MG tablet, Take 25 mg by mouth once daily., Disp: , Rfl:     magnesium oxide (MAG-OX) 400 mg (241.3 mg magnesium) tablet, Take 400 mg by mouth once daily., Disp: , Rfl:     metoprolol succinate (TOPROL-XL) 50 MG 24 hr tablet, Take 1 tablet (50 mg total) by mouth once daily. (Patient taking differently: Take 50 mg by mouth once daily. ), Disp: 30 tablet, Rfl: 11    oxyCODONE-acetaminophen (PERCOCET) 5-325 mg per tablet, Take 1 tablet by mouth daily as needed for Pain (with dialysis). , Disp: , Rfl:     predniSONE (DELTASONE) 10 MG tablet, Take 30 mg (3 tabs) x5 days, take 20 mg (2 tabs) x5 days, take 10 mg (1 tab) x5 days, Disp: 30 tablet, Rfl: 0    sevelamer HCl (RENAGEL) 800 MG Tab, Take 1 tablet (800 mg total) by mouth 3 (three) times daily with meals., Disp: 90 tablet, Rfl: 0    traMADol (ULTRAM) 50 mg tablet, Take 1 tablet (50 mg total) by mouth 3 (three) times a week. 3 TIMES A WEEK AS NEEDED DURING HEMODIALYSIS, Disp: 90 tablet, Rfl: 3    ondansetron (ZOFRAN-ODT) 4 MG TbDL, Take 1 tablet (4 mg total) by mouth every 6 (six) hours as needed., Disp: 60 tablet, Rfl: 4    Review of Systems   Constitutional: Positive for fatigue. Negative for appetite change.   HENT: Positive for postnasal drip and rhinorrhea.    Respiratory: Negative for wheezing.         ARGUELLO   Cardiovascular: Negative for palpitations.   Gastrointestinal: Negative for abdominal distention, constipation and diarrhea.   Endocrine: Positive for cold  "intolerance.   Genitourinary: Negative for difficulty urinating and dysuria.   Musculoskeletal: Positive for arthralgias and myalgias.   Neurological: Positive for weakness. Negative for tremors, numbness and headaches.   Psychiatric/Behavioral: Positive for sleep disturbance.          Transitional Care Note    Family and/or Caretaker present at visit?  Yes.  Diagnostic tests reviewed/disposition: No diagnosic tests pending after this hospitalization.  Disease/illness education:   Home health/community services discussion/referrals: Patient has home health established at [facility].   Establishment or re-establishment of referral orders for community resources: No other necessary community resources.   Discussion with other health care providers: [facility]         Objective:      Vitals:    10/03/19 1041   BP: (!) 158/78   Pulse: 72   Weight: 58.1 kg (128 lb)   Height: 5' 3.5" (1.613 m)     Physical Exam   Constitutional: She is oriented to person, place, and time. Vital signs are normal. She appears well-developed. No distress.   Chronically ill-appearing. Ambulates with walker   HENT:   Head: Normocephalic and atraumatic.   Right Ear: Tympanic membrane and external ear normal.   Left Ear: Tympanic membrane and external ear normal.   Mouth/Throat: She has dentures.   Eyes: Pupils are equal, round, and reactive to light. Conjunctivae, EOM and lids are normal.   Neck: Full passive range of motion without pain. Neck supple. No JVD present. No tracheal deviation present. No thyromegaly present.   Cardiovascular: Normal rate and regular rhythm. PMI is not displaced.   Murmur heard.   Systolic murmur is present with a grade of 5/6.  Pulmonary/Chest: Effort normal. She has decreased breath sounds.   Abdominal: Soft. Bowel sounds are normal. There is no hepatosplenomegaly. There is no tenderness. There is no rebound and no guarding.   Musculoskeletal: She exhibits no edema or tenderness. "   Neurological: She is alert and oriented to person, place, and time.   Skin: Skin is warm and dry. No rash noted.   Ulcer to left shin   Psychiatric: She has a normal mood and affect.   Vitals reviewed.        Assessment:       1. Hospital discharge follow-up    2. Calciphylaxis    3. Anemia due to chronic kidney disease, on chronic dialysis    4. ESRD (end stage renal disease)    5. Functional dyspepsia    6. Oxygen dependent    7. Chronic diastolic heart failure    8. Chronic obstructive pulmonary disease, unspecified COPD type    9. Pulmonary hypertension    10. DNR (do not resuscitate)    11. Chronic pain syndrome    12. Chronic a-fib         Plan:       Hospital discharge follow-up    Calciphylaxis    Anemia due to chronic kidney disease, on chronic dialysis    ESRD (end stage renal disease)    Functional dyspepsia  -     ondansetron (ZOFRAN-ODT) 4 MG TbDL; Take 1 tablet (4 mg total) by mouth every 6 (six) hours as needed.  Dispense: 60 tablet; Refill: 4    Oxygen dependent    Chronic diastolic heart failure    Chronic obstructive pulmonary disease, unspecified COPD type    Pulmonary hypertension    DNR (do not resuscitate)    Chronic pain syndrome  -     traMADol (ULTRAM) 50 mg tablet; Take 1 tablet (50 mg total) by mouth 3 (three) times a week. 3 TIMES A WEEK AS NEEDED DURING HEMODIALYSIS  Dispense: 90 tablet; Refill: 3    Chronic a-fib    chronically ill patient appears at her baseline. Continue current care. Family with understanding of her chronic conditions. Patient continues to smoke and eat as she desires  Follow up in about 4 months (around 2/3/2020).

## 2019-10-07 ENCOUNTER — APPOINTMENT (OUTPATIENT)
Dept: LAB | Facility: HOSPITAL | Age: 74
End: 2019-10-07
Attending: INTERNAL MEDICINE
Payer: MEDICARE

## 2019-10-07 DIAGNOSIS — I25.10 CORONARY ATHEROSCLEROSIS OF NATIVE CORONARY ARTERY: ICD-10-CM

## 2019-10-07 DIAGNOSIS — I48.0 PAROXYSMAL ATRIAL FIBRILLATION: ICD-10-CM

## 2019-10-07 DIAGNOSIS — E78.5 HYPERLIPEMIA: Primary | ICD-10-CM

## 2019-10-07 DIAGNOSIS — I50.9 HEART FAILURE, UNSPECIFIED: ICD-10-CM

## 2019-10-18 ENCOUNTER — HOSPITAL ENCOUNTER (INPATIENT)
Facility: HOSPITAL | Age: 74
LOS: 4 days | Discharge: HOME HEALTH | DRG: 640 | End: 2019-10-22
Attending: EMERGENCY MEDICINE | Admitting: INTERNAL MEDICINE
Payer: MEDICARE

## 2019-10-18 DIAGNOSIS — R06.02 SOB (SHORTNESS OF BREATH) ON EXERTION: ICD-10-CM

## 2019-10-18 DIAGNOSIS — I48.91 ATRIAL FIBRILLATION WITH RAPID VENTRICULAR RESPONSE: ICD-10-CM

## 2019-10-18 DIAGNOSIS — I50.9 CONGESTIVE HEART FAILURE, UNSPECIFIED HF CHRONICITY, UNSPECIFIED HEART FAILURE TYPE: Primary | ICD-10-CM

## 2019-10-18 DIAGNOSIS — J96.20 ACUTE ON CHRONIC RESPIRATORY FAILURE: ICD-10-CM

## 2019-10-18 DIAGNOSIS — Z99.2 END STAGE RENAL FAILURE ON DIALYSIS: ICD-10-CM

## 2019-10-18 DIAGNOSIS — N18.6 ESRD (END STAGE RENAL DISEASE): ICD-10-CM

## 2019-10-18 DIAGNOSIS — N18.6 END STAGE RENAL FAILURE ON DIALYSIS: ICD-10-CM

## 2019-10-18 DIAGNOSIS — J96.21 ACUTE ON CHRONIC RESPIRATORY FAILURE WITH HYPOXIA: ICD-10-CM

## 2019-10-18 DIAGNOSIS — I48.91 A-FIB: ICD-10-CM

## 2019-10-18 DIAGNOSIS — E11.649 HYPOGLYCEMIA ASSOCIATED WITH TYPE 2 DIABETES MELLITUS: ICD-10-CM

## 2019-10-18 DIAGNOSIS — R07.9 CHEST PAIN: ICD-10-CM

## 2019-10-18 PROBLEM — E87.70 VOLUME OVERLOAD: Status: ACTIVE | Noted: 2019-10-18

## 2019-10-18 PROBLEM — E87.20 METABOLIC ACIDOSIS: Status: ACTIVE | Noted: 2019-10-18

## 2019-10-18 LAB
ALBUMIN SERPL BCP-MCNC: 2.8 G/DL (ref 3.5–5.2)
ALLENS TEST: ABNORMAL
ALP SERPL-CCNC: 347 U/L (ref 55–135)
ALT SERPL W/O P-5'-P-CCNC: 194 U/L (ref 10–44)
ANION GAP SERPL CALC-SCNC: 23 MMOL/L (ref 8–16)
ANION GAP SERPL CALC-SCNC: 32 MMOL/L (ref 8–16)
AST SERPL-CCNC: 351 U/L (ref 10–40)
BASOPHILS # BLD AUTO: 0.02 K/UL (ref 0–0.2)
BASOPHILS NFR BLD: 0.2 % (ref 0–1.9)
BILIRUB SERPL-MCNC: 2.4 MG/DL (ref 0.1–1)
BNP SERPL-MCNC: >4500 PG/ML (ref 0–99)
BUN SERPL-MCNC: 45 MG/DL (ref 6–30)
BUN SERPL-MCNC: 49 MG/DL (ref 8–23)
CALCIUM SERPL-MCNC: 7.1 MG/DL (ref 8.7–10.5)
CHLORIDE SERPL-SCNC: 91 MMOL/L (ref 95–110)
CHLORIDE SERPL-SCNC: 99 MMOL/L (ref 95–110)
CK MB SERPL-MCNC: 2.9 NG/ML (ref 0.1–6.5)
CK MB SERPL-MCNC: 3.5 NG/ML (ref 0.1–6.5)
CO2 SERPL-SCNC: 17 MMOL/L (ref 23–29)
CREAT SERPL-MCNC: 5.6 MG/DL (ref 0.5–1.4)
CREAT SERPL-MCNC: 6.5 MG/DL (ref 0.5–1.4)
DELSYS: ABNORMAL
DIFFERENTIAL METHOD: ABNORMAL
EOSINOPHIL # BLD AUTO: 0 K/UL (ref 0–0.5)
EOSINOPHIL NFR BLD: 0 % (ref 0–8)
EP: 6
ERYTHROCYTE [DISTWIDTH] IN BLOOD BY AUTOMATED COUNT: 19.9 % (ref 11.5–14.5)
ERYTHROCYTE [SEDIMENTATION RATE] IN BLOOD BY WESTERGREN METHOD: 16 MM/H
EST. GFR  (AFRICAN AMERICAN): 8 ML/MIN/1.73 M^2
EST. GFR  (NON AFRICAN AMERICAN): 7 ML/MIN/1.73 M^2
FIO2: 40
GLUCOSE SERPL-MCNC: 100 MG/DL (ref 70–110)
GLUCOSE SERPL-MCNC: 112 MG/DL (ref 70–110)
GLUCOSE SERPL-MCNC: 83 MG/DL (ref 70–110)
GLUCOSE SERPL-MCNC: 94 MG/DL (ref 70–110)
GLUCOSE SERPL-MCNC: <20 MG/DL (ref 70–110)
HCO3 UR-SCNC: 15.8 MMOL/L (ref 24–28)
HCT VFR BLD AUTO: 42.6 % (ref 37–48.5)
HCT VFR BLD CALC: 46 %PCV (ref 36–54)
HGB BLD-MCNC: 13.2 G/DL (ref 12–16)
IMM GRANULOCYTES # BLD AUTO: 0.09 K/UL (ref 0–0.04)
IMM GRANULOCYTES NFR BLD AUTO: 0.8 % (ref 0–0.5)
INR PPP: 1.7
IP: 16
LACTATE SERPL-SCNC: 2.5 MMOL/L (ref 0.5–1.9)
LACTATE SERPL-SCNC: 5.4 MMOL/L (ref 0.5–1.9)
LACTATE SERPL-SCNC: 6.3 MMOL/L (ref 0.5–1.9)
LDH SERPL L TO P-CCNC: 8.35 MMOL/L (ref 0.5–2.2)
LYMPHOCYTES # BLD AUTO: 0.9 K/UL (ref 1–4.8)
LYMPHOCYTES NFR BLD: 7.9 % (ref 18–48)
MCH RBC QN AUTO: 29.2 PG (ref 27–31)
MCHC RBC AUTO-ENTMCNC: 31 G/DL (ref 32–36)
MCV RBC AUTO: 94 FL (ref 82–98)
MIN VOL: 8
MODE: ABNORMAL
MONOCYTES # BLD AUTO: 1.1 K/UL (ref 0.3–1)
MONOCYTES NFR BLD: 10.2 % (ref 4–15)
NEUTROPHILS # BLD AUTO: 8.9 K/UL (ref 1.8–7.7)
NEUTROPHILS NFR BLD: 80.9 % (ref 38–73)
NRBC BLD-RTO: 1 /100 WBC
PCO2 BLDA: 36 MMHG (ref 35–45)
PH SMN: 7.25 [PH] (ref 7.35–7.45)
PLATELET # BLD AUTO: 158 K/UL (ref 150–350)
PMV BLD AUTO: 10.8 FL (ref 9.2–12.9)
PO2 BLDA: 138 MMHG (ref 80–100)
POC BE: -11 MMOL/L
POC IONIZED CALCIUM: 0.76 MMOL/L (ref 1.06–1.42)
POC SATURATED O2: 99 % (ref 95–100)
POC TCO2 (MEASURED): 18 MMOL/L (ref 23–29)
POC TCO2: 17 MMOL/L (ref 23–27)
POTASSIUM BLD-SCNC: 5 MMOL/L (ref 3.5–5.1)
POTASSIUM SERPL-SCNC: 4.9 MMOL/L (ref 3.5–5.1)
PROT SERPL-MCNC: 6.1 G/DL (ref 6–8.4)
PROTHROMBIN TIME: 18.9 SEC (ref 10.6–14.8)
RBC # BLD AUTO: 4.52 M/UL (ref 4–5.4)
SAMPLE: ABNORMAL
SITE: ABNORMAL
SODIUM BLD-SCNC: 134 MMOL/L (ref 136–145)
SODIUM SERPL-SCNC: 140 MMOL/L (ref 136–145)
SP02: 100
SPONT RATE: 20
TROPONIN I SERPL DL<=0.01 NG/ML-MCNC: 0.09 NG/ML (ref 0.02–0.04)
TROPONIN I SERPL DL<=0.01 NG/ML-MCNC: 0.11 NG/ML (ref 0.02–0.04)
TROPONIN I SERPL DL<=0.01 NG/ML-MCNC: 0.12 NG/ML (ref 0.02–0.04)
WBC # BLD AUTO: 10.95 K/UL (ref 3.9–12.7)

## 2019-10-18 PROCEDURE — 82962 GLUCOSE BLOOD TEST: CPT

## 2019-10-18 PROCEDURE — 20000000 HC ICU ROOM

## 2019-10-18 PROCEDURE — 83605 ASSAY OF LACTIC ACID: CPT

## 2019-10-18 PROCEDURE — 83605 ASSAY OF LACTIC ACID: CPT | Mod: 91

## 2019-10-18 PROCEDURE — 85610 PROTHROMBIN TIME: CPT

## 2019-10-18 PROCEDURE — 82553 CREATINE MB FRACTION: CPT | Mod: 91

## 2019-10-18 PROCEDURE — 96365 THER/PROPH/DIAG IV INF INIT: CPT

## 2019-10-18 PROCEDURE — 90935 HEMODIALYSIS ONE EVALUATION: CPT

## 2019-10-18 PROCEDURE — 25000003 PHARM REV CODE 250: Performed by: EMERGENCY MEDICINE

## 2019-10-18 PROCEDURE — 63600175 PHARM REV CODE 636 W HCPCS: Performed by: EMERGENCY MEDICINE

## 2019-10-18 PROCEDURE — 25000003 PHARM REV CODE 250

## 2019-10-18 PROCEDURE — 36600 WITHDRAWAL OF ARTERIAL BLOOD: CPT

## 2019-10-18 PROCEDURE — 94640 AIRWAY INHALATION TREATMENT: CPT

## 2019-10-18 PROCEDURE — 94644 CONT INHLJ TX 1ST HOUR: CPT

## 2019-10-18 PROCEDURE — 84484 ASSAY OF TROPONIN QUANT: CPT

## 2019-10-18 PROCEDURE — 63600175 PHARM REV CODE 636 W HCPCS: Performed by: INTERNAL MEDICINE

## 2019-10-18 PROCEDURE — 25000003 PHARM REV CODE 250: Performed by: INTERNAL MEDICINE

## 2019-10-18 PROCEDURE — 84484 ASSAY OF TROPONIN QUANT: CPT | Mod: 91

## 2019-10-18 PROCEDURE — 86704 HEP B CORE ANTIBODY TOTAL: CPT

## 2019-10-18 PROCEDURE — 85025 COMPLETE CBC W/AUTO DIFF WBC: CPT

## 2019-10-18 PROCEDURE — 94761 N-INVAS EAR/PLS OXIMETRY MLT: CPT

## 2019-10-18 PROCEDURE — 99291 CRITICAL CARE FIRST HOUR: CPT

## 2019-10-18 PROCEDURE — 99900035 HC TECH TIME PER 15 MIN (STAT)

## 2019-10-18 PROCEDURE — 36415 COLL VENOUS BLD VENIPUNCTURE: CPT

## 2019-10-18 PROCEDURE — 96375 TX/PRO/DX INJ NEW DRUG ADDON: CPT

## 2019-10-18 PROCEDURE — 80053 COMPREHEN METABOLIC PANEL: CPT

## 2019-10-18 PROCEDURE — 25000242 PHARM REV CODE 250 ALT 637 W/ HCPCS: Performed by: EMERGENCY MEDICINE

## 2019-10-18 PROCEDURE — 25000242 PHARM REV CODE 250 ALT 637 W/ HCPCS: Performed by: INTERNAL MEDICINE

## 2019-10-18 PROCEDURE — 27000221 HC OXYGEN, UP TO 24 HOURS

## 2019-10-18 PROCEDURE — 94660 CPAP INITIATION&MGMT: CPT

## 2019-10-18 PROCEDURE — 87340 HEPATITIS B SURFACE AG IA: CPT

## 2019-10-18 PROCEDURE — 83880 ASSAY OF NATRIURETIC PEPTIDE: CPT

## 2019-10-18 PROCEDURE — 93005 ELECTROCARDIOGRAM TRACING: CPT

## 2019-10-18 PROCEDURE — 82803 BLOOD GASES ANY COMBINATION: CPT

## 2019-10-18 PROCEDURE — 86706 HEP B SURFACE ANTIBODY: CPT

## 2019-10-18 PROCEDURE — 96376 TX/PRO/DX INJ SAME DRUG ADON: CPT

## 2019-10-18 RX ORDER — DILTIAZEM HYDROCHLORIDE 5 MG/ML
10 INJECTION INTRAVENOUS
Status: COMPLETED | OUTPATIENT
Start: 2019-10-18 | End: 2019-10-18

## 2019-10-18 RX ORDER — LOSARTAN POTASSIUM 25 MG/1
25 TABLET ORAL DAILY
Status: DISCONTINUED | OUTPATIENT
Start: 2019-10-18 | End: 2019-10-22 | Stop reason: HOSPADM

## 2019-10-18 RX ORDER — IPRATROPIUM BROMIDE 0.5 MG/2.5ML
0.5 SOLUTION RESPIRATORY (INHALATION) EVERY 6 HOURS PRN
Status: DISCONTINUED | OUTPATIENT
Start: 2019-10-18 | End: 2019-10-22 | Stop reason: HOSPADM

## 2019-10-18 RX ORDER — NAPROXEN SODIUM 220 MG/1
81 TABLET, FILM COATED ORAL DAILY
Status: DISCONTINUED | OUTPATIENT
Start: 2019-10-19 | End: 2019-10-22 | Stop reason: HOSPADM

## 2019-10-18 RX ORDER — ONDANSETRON 2 MG/ML
4 INJECTION INTRAMUSCULAR; INTRAVENOUS EVERY 8 HOURS PRN
Status: DISCONTINUED | OUTPATIENT
Start: 2019-10-18 | End: 2019-10-22 | Stop reason: HOSPADM

## 2019-10-18 RX ORDER — LEVALBUTEROL INHALATION SOLUTION 0.63 MG/3ML
0.63 SOLUTION RESPIRATORY (INHALATION)
Status: DISCONTINUED | OUTPATIENT
Start: 2019-10-18 | End: 2019-10-19

## 2019-10-18 RX ORDER — IPRATROPIUM BROMIDE 0.5 MG/2.5ML
1 SOLUTION RESPIRATORY (INHALATION)
Status: COMPLETED | OUTPATIENT
Start: 2019-10-18 | End: 2019-10-18

## 2019-10-18 RX ORDER — ISOSORBIDE MONONITRATE 30 MG/1
30 TABLET, EXTENDED RELEASE ORAL DAILY
Status: DISCONTINUED | OUTPATIENT
Start: 2019-10-18 | End: 2019-10-22 | Stop reason: HOSPADM

## 2019-10-18 RX ORDER — IBUPROFEN 200 MG
24 TABLET ORAL
Status: DISCONTINUED | OUTPATIENT
Start: 2019-10-18 | End: 2019-10-22 | Stop reason: HOSPADM

## 2019-10-18 RX ORDER — DILTIAZEM HYDROCHLORIDE 180 MG/1
180 CAPSULE, COATED, EXTENDED RELEASE ORAL DAILY
Status: DISCONTINUED | OUTPATIENT
Start: 2019-10-18 | End: 2019-10-22 | Stop reason: HOSPADM

## 2019-10-18 RX ORDER — ACETAMINOPHEN 325 MG/1
650 TABLET ORAL EVERY 8 HOURS PRN
Status: DISCONTINUED | OUTPATIENT
Start: 2019-10-18 | End: 2019-10-19

## 2019-10-18 RX ORDER — DILTIAZEM HYDROCHLORIDE 180 MG/1
180 CAPSULE, COATED, EXTENDED RELEASE ORAL DAILY
COMMUNITY
End: 2020-11-03

## 2019-10-18 RX ORDER — SEVELAMER CARBONATE 800 MG/1
800 TABLET, FILM COATED ORAL
Status: DISCONTINUED | OUTPATIENT
Start: 2019-10-18 | End: 2019-10-22 | Stop reason: HOSPADM

## 2019-10-18 RX ORDER — NAPROXEN SODIUM 220 MG/1
81 TABLET, FILM COATED ORAL DAILY
Status: DISCONTINUED | OUTPATIENT
Start: 2019-10-19 | End: 2019-10-18

## 2019-10-18 RX ORDER — SODIUM CHLORIDE 0.9 % (FLUSH) 0.9 %
10 SYRINGE (ML) INJECTION
Status: DISCONTINUED | OUTPATIENT
Start: 2019-10-18 | End: 2019-10-22 | Stop reason: HOSPADM

## 2019-10-18 RX ORDER — ENOXAPARIN SODIUM 100 MG/ML
30 INJECTION SUBCUTANEOUS EVERY 24 HOURS
Status: DISCONTINUED | OUTPATIENT
Start: 2019-10-18 | End: 2019-10-22 | Stop reason: HOSPADM

## 2019-10-18 RX ORDER — NAPROXEN SODIUM 220 MG/1
324 TABLET, FILM COATED ORAL ONCE
Status: COMPLETED | OUTPATIENT
Start: 2019-10-18 | End: 2019-10-18

## 2019-10-18 RX ORDER — DILTIAZEM HCL 1 MG/ML
5 INJECTION, SOLUTION INTRAVENOUS CONTINUOUS
Status: DISCONTINUED | OUTPATIENT
Start: 2019-10-18 | End: 2019-10-19

## 2019-10-18 RX ORDER — LANOLIN ALCOHOL/MO/W.PET/CERES
400 CREAM (GRAM) TOPICAL DAILY
Status: DISCONTINUED | OUTPATIENT
Start: 2019-10-18 | End: 2019-10-22 | Stop reason: HOSPADM

## 2019-10-18 RX ORDER — LEVALBUTEROL 1.25 MG/.5ML
1.25 SOLUTION, CONCENTRATE RESPIRATORY (INHALATION)
Status: COMPLETED | OUTPATIENT
Start: 2019-10-18 | End: 2019-10-18

## 2019-10-18 RX ORDER — CHLORHEXIDINE GLUCONATE ORAL RINSE 1.2 MG/ML
15 SOLUTION DENTAL 2 TIMES DAILY
Status: DISCONTINUED | OUTPATIENT
Start: 2019-10-18 | End: 2019-10-22 | Stop reason: HOSPADM

## 2019-10-18 RX ORDER — NAPROXEN SODIUM 220 MG/1
81 TABLET, FILM COATED ORAL DAILY
Status: DISCONTINUED | OUTPATIENT
Start: 2019-10-18 | End: 2019-10-18

## 2019-10-18 RX ORDER — METOPROLOL SUCCINATE 50 MG/1
50 TABLET, EXTENDED RELEASE ORAL DAILY
Status: DISCONTINUED | OUTPATIENT
Start: 2019-10-18 | End: 2019-10-22 | Stop reason: HOSPADM

## 2019-10-18 RX ORDER — CALCIUM ACETATE 667 MG/1
1334 CAPSULE ORAL
Status: DISCONTINUED | OUTPATIENT
Start: 2019-10-18 | End: 2019-10-22 | Stop reason: HOSPADM

## 2019-10-18 RX ORDER — POLYETHYLENE GLYCOL 3350 17 G/17G
17 POWDER, FOR SOLUTION ORAL DAILY
Status: DISCONTINUED | OUTPATIENT
Start: 2019-10-18 | End: 2019-10-22 | Stop reason: HOSPADM

## 2019-10-18 RX ORDER — FLUTICASONE FUROATE AND VILANTEROL 100; 25 UG/1; UG/1
1 POWDER RESPIRATORY (INHALATION) DAILY
Status: DISCONTINUED | OUTPATIENT
Start: 2019-10-18 | End: 2019-10-22 | Stop reason: HOSPADM

## 2019-10-18 RX ORDER — IBUPROFEN 200 MG
16 TABLET ORAL
Status: DISCONTINUED | OUTPATIENT
Start: 2019-10-18 | End: 2019-10-22 | Stop reason: HOSPADM

## 2019-10-18 RX ORDER — LIDOCAINE HCL/PF 100 MG/5ML
SYRINGE (ML) INTRAVENOUS
Status: DISPENSED
Start: 2019-10-18 | End: 2019-10-19

## 2019-10-18 RX ORDER — LIDOCAINE HYDROCHLORIDE 10 MG/ML
INJECTION, SOLUTION EPIDURAL; INFILTRATION; INTRACAUDAL; PERINEURAL
Status: COMPLETED
Start: 2019-10-18 | End: 2019-10-18

## 2019-10-18 RX ORDER — TRAMADOL HYDROCHLORIDE 50 MG/1
50 TABLET ORAL
Status: DISCONTINUED | OUTPATIENT
Start: 2019-10-18 | End: 2019-10-22 | Stop reason: HOSPADM

## 2019-10-18 RX ORDER — ACETAMINOPHEN 325 MG/1
650 TABLET ORAL EVERY 4 HOURS PRN
Status: DISCONTINUED | OUTPATIENT
Start: 2019-10-18 | End: 2019-10-19

## 2019-10-18 RX ORDER — DILTIAZEM HCL/D5W 125 MG/125
5 PLASTIC BAG, INJECTION (ML) INTRAVENOUS CONTINUOUS
Status: DISCONTINUED | OUTPATIENT
Start: 2019-10-18 | End: 2019-10-18

## 2019-10-18 RX ORDER — GLUCAGON 1 MG
1 KIT INJECTION
Status: DISCONTINUED | OUTPATIENT
Start: 2019-10-18 | End: 2019-10-22 | Stop reason: HOSPADM

## 2019-10-18 RX ORDER — OXYCODONE AND ACETAMINOPHEN 5; 325 MG/1; MG/1
1 TABLET ORAL DAILY PRN
Status: DISCONTINUED | OUTPATIENT
Start: 2019-10-18 | End: 2019-10-20

## 2019-10-18 RX ORDER — LEVETIRACETAM 500 MG/1
500 TABLET ORAL
Status: ON HOLD | COMMUNITY
End: 2020-01-08 | Stop reason: HOSPADM

## 2019-10-18 RX ORDER — MUPIROCIN 20 MG/G
OINTMENT TOPICAL 2 TIMES DAILY
Status: DISCONTINUED | OUTPATIENT
Start: 2019-10-18 | End: 2019-10-22 | Stop reason: HOSPADM

## 2019-10-18 RX ORDER — LEVETIRACETAM 500 MG/1
500 TABLET ORAL
Status: DISCONTINUED | OUTPATIENT
Start: 2019-10-18 | End: 2019-10-22 | Stop reason: HOSPADM

## 2019-10-18 RX ADMIN — LEVALBUTEROL HYDROCHLORIDE 0.63 MG: 0.63 SOLUTION RESPIRATORY (INHALATION) at 04:10

## 2019-10-18 RX ADMIN — SEVELAMER CARBONATE 800 MG: 800 TABLET, FILM COATED ORAL at 10:10

## 2019-10-18 RX ADMIN — PIPERACILLIN AND TAZOBACTAM 3.38 G: 3; .375 INJECTION, POWDER, LYOPHILIZED, FOR SOLUTION INTRAVENOUS; PARENTERAL at 01:10

## 2019-10-18 RX ADMIN — ENOXAPARIN SODIUM 30 MG: 100 INJECTION SUBCUTANEOUS at 06:10

## 2019-10-18 RX ADMIN — IPRATROPIUM BROMIDE 1 MG: 0.5 SOLUTION RESPIRATORY (INHALATION) at 11:10

## 2019-10-18 RX ADMIN — DILTIAZEM HYDROCHLORIDE 180 MG: 180 CAPSULE, COATED, EXTENDED RELEASE ORAL at 10:10

## 2019-10-18 RX ADMIN — LEVALBUTEROL HYDROCHLORIDE 1.25 MG: 1.25 SOLUTION, CONCENTRATE RESPIRATORY (INHALATION) at 11:10

## 2019-10-18 RX ADMIN — DILTIAZEM HYDROCHLORIDE 5 MG/HR: 5 INJECTION INTRAVENOUS at 01:10

## 2019-10-18 RX ADMIN — POLYETHYLENE GLYCOL 3350 17 G: 17 POWDER, FOR SOLUTION ORAL at 06:10

## 2019-10-18 RX ADMIN — MUPIROCIN: 20 OINTMENT TOPICAL at 10:10

## 2019-10-18 RX ADMIN — ASPIRIN 81 MG 324 MG: 81 TABLET ORAL at 02:10

## 2019-10-18 RX ADMIN — Medication 5 MG/HR: at 02:10

## 2019-10-18 RX ADMIN — CALCIUM ACETATE 1334 MG: 667 CAPSULE ORAL at 06:10

## 2019-10-18 RX ADMIN — ISOSORBIDE MONONITRATE 30 MG: 30 TABLET, EXTENDED RELEASE ORAL at 06:10

## 2019-10-18 RX ADMIN — LEVALBUTEROL HYDROCHLORIDE 0.63 MG: 0.63 SOLUTION RESPIRATORY (INHALATION) at 07:10

## 2019-10-18 RX ADMIN — ISOSORBIDE MONONITRATE 30 MG: 30 TABLET, EXTENDED RELEASE ORAL at 10:10

## 2019-10-18 RX ADMIN — DILTIAZEM HYDROCHLORIDE 10 MG: 5 INJECTION INTRAVENOUS at 12:10

## 2019-10-18 RX ADMIN — LEVETIRACETAM 500 MG: 500 TABLET, FILM COATED ORAL at 06:10

## 2019-10-18 RX ADMIN — FLUTICASONE FUROATE AND VILANTEROL TRIFENATATE 1 PUFF: 100; 25 POWDER RESPIRATORY (INHALATION) at 04:10

## 2019-10-18 RX ADMIN — MAGNESIUM OXIDE 400 MG: 400 TABLET ORAL at 06:10

## 2019-10-18 RX ADMIN — TRAMADOL HYDROCHLORIDE 50 MG: 50 TABLET, COATED ORAL at 10:10

## 2019-10-18 RX ADMIN — LOSARTAN POTASSIUM 25 MG: 25 TABLET, FILM COATED ORAL at 06:10

## 2019-10-18 RX ADMIN — METOPROLOL SUCCINATE 50 MG: 50 TABLET, FILM COATED, EXTENDED RELEASE ORAL at 10:10

## 2019-10-18 RX ADMIN — CHLORHEXIDINE GLUCONATE 15 ML: 1.2 RINSE ORAL at 10:10

## 2019-10-18 NOTE — ASSESSMENT & PLAN NOTE
Chronic medical condition  Continue home meds  Levalbuterol and ipratropium inhalation/nebulization

## 2019-10-18 NOTE — ASSESSMENT & PLAN NOTE
Continue to smoke, history of COPD, with history of exacerbations  Patient not interested in quitting smoking

## 2019-10-18 NOTE — PROGRESS NOTES
Renal Dosing of Medication    An order has been entered by a pharmacist to adjust the dose and/or frequency of the medication listed below based on the patient's renal function.    Objective:  74 y.o., female, Actual Body Weight = 64 kg (141 lb 1.5 oz)    Patient receives hemodialysis.    Current Regimen:  40 mg sq q24h    Assessment:  Estimated Creatinine Clearance: 8.1 mL/min (A) (based on SCr of 5.6 mg/dL (H)).  Renal function is  poor.    Plan:  Orders have been entered to adjust the dose and/or frequency based on the patient's weight and renal function:  30 mg IV every 24 hours.    Thank you for allowing us to participate in this patient's care.     Carlos Robertson 10/18/2019 4:53 PM  Department of Pharmacy  Ext [phone]

## 2019-10-18 NOTE — ED PROVIDER NOTES
Encounter Date: 10/18/2019       History     Chief Complaint   Patient presents with    Chest Pain    Shortness of Breath     last dialysis wed     Patient here with reported shortness of breath and chest pain patient is scheduled for dialysis today however family states they ran after rescheduled for tomorrow at arrival emergency department patient was found to be tachypneic tachycardic hypoxic and hypotensive initial heart rate in the 130s in AFib with RVR initial oxygen saturations 60% and initial blood pressure 60    The history is provided by the patient and a relative.     Review of patient's allergies indicates:  No Known Allergies  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    both legs    CHF (congestive heart failure)     Encounter for blood transfusion 03/2016    Gout     Hypertension     Mitral valve regurgitation     Osteoarthritis     Pancreatitis     Peripheral vascular disease     Peritoneal dialysis catheter in place     Pneumonia 09/09/2017    Renal failure      Past Surgical History:   Procedure Laterality Date    ACHILLES TENDON SURGERY Right     APPENDECTOMY      CARDIAC CATHETERIZATION  07/03/2017    CHOLECYSTECTOMY      heal surgery Right     HYSTERECTOMY      PARATHYROIDECTOMY      PARATHYROIDECTOMY  07/13/2017    PERITONEAL CATHETER INSERTION      RENAL BIOPSY      vocal cord nodule       Family History   Problem Relation Age of Onset    Heart disease Sister     Early death Sister         heart as baby    Heart disease Maternal Grandfather     Diabetes Mother     Hypertension Mother     Heart failure Mother     Heart disease Mother     Arthritis Mother     Diabetes Father     Early death Sister         infant     Social History     Tobacco Use    Smoking status: Current Some Day Smoker     Packs/day: 0.50     Years: 45.00     Pack years: 22.50    Smokeless tobacco: Never Used   Substance Use Topics    Alcohol use: No     Drug use: No     Review of Systems   Unable to perform ROS: Acuity of condition       Physical Exam     Initial Vitals   BP Pulse Resp Temp SpO2   10/18/19 1044 10/18/19 1044 10/18/19 1044 10/18/19 1044 10/18/19 1115   (!) 159/88 (!) 133 (!) 24 98 °F (36.7 °C) 98 %      MAP       --                Physical Exam    Constitutional: She appears lethargic. She is diaphoretic. She appears cachectic. She appears ill. She appears distressed.   HENT:   Head: Normocephalic and atraumatic.   Right Ear: External ear normal.   Left Ear: External ear normal.   Nose: Nose normal.   Mouth/Throat: No oropharyngeal exudate.   Mucosa dry   Eyes: Conjunctivae and EOM are normal. Pupils are equal, round, and reactive to light.   Neck: Normal range of motion. Neck supple.   Cardiovascular: An irregularly irregular rhythm present.  Tachycardia present.    Pulses:       Radial pulses are 1+ on the right side, and 1+ on the left side.        Popliteal pulses are 1+ on the right side, and 1+ on the left side.        Posterior tibial pulses are 1+ on the right side, and 1+ on the left side.   Pulmonary/Chest: Tachypnea noted. She is in respiratory distress. She has decreased breath sounds. She has wheezes. She has rhonchi. She has rales.   Abdominal: Soft. Bowel sounds are normal. There is no tenderness. There is no rebound and no guarding.   Musculoskeletal: Normal range of motion. She exhibits no edema.   Lymphadenopathy:     She has no cervical adenopathy.   Neurological: She appears lethargic. GCS eye subscore is 4. GCS verbal subscore is 5. GCS motor subscore is 6.   Psychiatric: Her affect is blunt. She is slowed and withdrawn. She is attentive.         ED Course   Procedures  Labs Reviewed   CBC W/ AUTO DIFFERENTIAL - Abnormal; Notable for the following components:       Result Value    Mean Corpuscular Hemoglobin Conc 31.0 (*)     RDW 19.9 (*)     Immature Granulocytes 0.8 (*)     Gran # (ANC) 8.9 (*)     Immature Grans (Abs) 0.09  (*)     Lymph # 0.9 (*)     Mono # 1.1 (*)     nRBC 1 (*)     Gran% 80.9 (*)     Lymph% 7.9 (*)     All other components within normal limits   COMPREHENSIVE METABOLIC PANEL - Abnormal; Notable for the following components:    Chloride 91 (*)     CO2 17 (*)     Glucose 112 (*)     Calcium 7.1 (*)     Anion Gap 32 (*)     All other components within normal limits    Narrative:     Recoll. [id number] by AB3 at 10/18/2019 12:07, reason: Insufficient   specimen and suspected contamination. Notified kathy Rosen   in ED.   B-TYPE NATRIURETIC PEPTIDE - Abnormal; Notable for the following components:    BNP >4,500 (*)     All other components within normal limits   PROTIME-INR - Abnormal; Notable for the following components:    PT 18.9 (*)     All other components within normal limits   LACTIC ACID, PLASMA - Abnormal; Notable for the following components:    Lactate (Lactic Acid) 6.3 (*)     All other components within normal limits    Narrative:     Lactic Acid critical result(s) called and verbal readback obtained   from Ilene Vuong RN/ED, 10/18/2019 12:50   ISTAT LACTATE - Abnormal; Notable for the following components:    POC Lactate 8.35 (*)     All other components within normal limits   ISTAT PROCEDURE - Abnormal; Notable for the following components:    POC PH 7.251 (*)     POC PO2 138 (*)     POC HCO3 15.8 (*)     POC TCO2 17 (*)     All other components within normal limits   TROPONIN I   POCT GLUCOSE   POCT GLUCOSE   POCT LACTATE   ISTAT CHEM8        ECG Results          EKG 12-lead (In process)  Result time 10/18/19 11:27:01    In process by Interface, Lab In [facility] (10/18/19 11:27:01)                 Narrative:    Test Reason : R07.9,    Vent. Rate : 134 BPM     Atrial Rate : 156 BPM     P-R Int : 000 ms          QRS Dur : 114 ms      QT Int : 342 ms       P-R-T Axes : 000 -57 115 degrees     QTc Int : 510 ms    Atrial fibrillation with rapid ventricular response  Left anterior fascicular block  T wave  abnormality, consider lateral ischemia  Abnormal ECG  When compared with ECG of 30-SEP-2019 06:31,  Left anterior fascicular block is now Present  Nonspecific T wave abnormality no longer evident in Inferior leads  T wave amplitude has increased in Anterior leads    Referred By: [email or web address]   SELF           Confirmed By:                             Imaging Results          X-Ray Chest AP Portable (Final result)  Result time 10/18/19 11:42:48    Final result by Brian Eugene MD (10/18/19 11:42:48)                 Impression:      1. Stable moderate cardiomegaly and enlarged central pulmonary vasculature, with enlarged PA trunk.  2. Mild scattered interstitial opacities in both lungs, with small bilateral pleural effusions.  3. Nonspecific right lower lung nodular opacities, potentially loculated pleural fluid.  Follow-up PA and lateral chest radiograph is recommended when clinically feasible.      No evidence of active cardiopulmonary disease.      Electronically signed by: Brian Eugene MD  Date:    10/18/2019  Time:    11:42             Narrative:    EXAMINATION:  XR CHEST AP PORTABLE    CLINICAL HISTORY:  Chest pain and dyspnea.    FINDINGS:  Portable chest radiograph at 11:17 hours compared to multiple prior exams including 10/01/2019 shows the cardiac silhouette is moderately enlarged, stable in size, with stable enlarged pulmonary arterial trunk.  The central pulmonary vasculature is prominent, with aortic vascular calcifications.  There are changes of prior mitral valvuloplasty.    The lungs are normally expanded, with scattered reticulonodular and ground-glass opacities throughout both lungs, with nonspecific nodular densities in the right lower lung, potentially loculated pleural fluid.  There is fluid in the right minor fissure, with small pleural effusions blunting the costophrenic angles.  There is no pneumothorax.  No acute osseous abnormality.                                 Medical Decision Making:   ED  Management:  Patient placed on BiPAP at arrival she was given amp of D50 with improvement in her blood sugar from 20-90 for this also improved her mentation BiPAP improved her oxygen saturation from  patient blood pressure improved with control of hypoglycemia she was given Cardizem bolus followed by Cardizem drip which controlled her atrial fibrillation further improving her distress I have discussed the case with  who will contact dialysis unit for arrangement of dialysis I have discussed case with Dr. Yu who will evaluate patient for admission              Attending Attestation:         Attending Critical Care:   Critical Care Times:   Direct Patient Care (initial evaluation, reassessments, and time considering the case)................................................................20 minutes.   Additional History from reviewing old medical records or taking additional history from the family, EMS, PCP, etc.......................10 minutes.   Ordering, Reviewing, and Interpreting Diagnostic Studies...............................................................................................................15 minutes.   Documentation..................................................................................................................................................................................10 minutes.   Consultation with other Physicians. .................................................................................................................................................10 minutes.   ==============================================================  · Total Critical Care Time - exclusive of procedural time: 65 minutes.  ==============================================================  Critical care was necessary to treat or prevent imminent or life-threatening deterioration of the following conditions: COPD exacerbation, cardiac arrhythmia, congestive heart failure,  respiratory failure, endocrine crisis and renal failure.   Critical care was time spent personally by me on the following activities: examination of patient, review of old charts, ordering lab, x-rays, and/or EKG, ordering and performing treatments and interventions, evaluation of patient's response to treatment, discussion with consultants, discussions with primary provider, interpretation of cardiac measurements, re-evaluation of patient's conition and obtaining history from patient or relative.   Critical Care Condition: life-threatening                  Clinical Impression:       ICD-10-CM ICD-9-CM   1. Congestive heart failure, unspecified HF chronicity, unspecified heart failure type I50.9 428.0   2. Chest pain R07.9 786.50   3. SOB (shortness of breath) on exertion R06.02 786.05   4. Atrial fibrillation with rapid ventricular response I48.91 427.31   5. End stage renal failure on dialysis N18.6 585.6    Z99.2 V45.11   6. Hypoglycemia associated with type 2 diabetes mellitus E11.649 250.80                                Gilbert Gutierrez MD  10/18/19 [id number]

## 2019-10-18 NOTE — ASSESSMENT & PLAN NOTE
Currently in AFib with RVR  Received Cardizem loading dose and now on Cardizem drip, changed to titratable form  Ordered echocardiogram  Consulted Cardiology Dr. Powell  Continue home meds  AFib with RVR might push patient into heart failure, BNP greater than 4500.  Could also be due to fluid overload as she is a hemodialysis patient  BNP may improved after ultrafiltration (nephrology consulted already)

## 2019-10-18 NOTE — HPI
74-year-old  female was known to the service, known history of AFib, congestive heart failures, end-stage renal disease on HD  and Friday, COPD and continued to smoke, severe peripheral vascular disease, calciphylaxis, history of blood transfusion, gout, hypertension, hyperlipidemia presented to the ER with complaint of chest pain and severe shortness of breath.  Upon arrival to the ER patient O2 sats were 60% heart rate was 130 and she had bilateral rales.  On Accu-Chek her blood glucose was 20 (past medical history shows diabetes but sister denies history of diabetes, reporting she does not take any pills or insulin).  Sister report she was finely last night, and this morning she woke up with complaint of not feeling well.  Sister also informed that patient was planning to skip her dialysis today to attend her cousin's  , but had to come to ER because of her condition and difficulty breathing. Patient was in respiratory distress and was started on BiPAP of IP 16, a P 6 and FiO2 40% with improvement in her O2 sat to %. Patient was in AFib with RVR upon arrival in the ER, she was given Cardizem loading dose and started on Cardizem drip. Patient had bilateral rales and clearly in heart failure, BNP was > 4500.   Lactic acid 6.3, patient also has metabolic acidosis, could possibly due to respiratory distress but at this time infection cannot be ruled out, not convincing about sepsis. Nephrology Dr. gibson was consulted through ER for urgent dialysis orders. Cardiology will be consulted Dr. Powell  Patient will be admitted to ICU for close monitoring.

## 2019-10-18 NOTE — ASSESSMENT & PLAN NOTE
Rule out ACS  Trend cardiac enzymes  Echo ordered  Cardiology already consulted for AFib with RVR

## 2019-10-18 NOTE — ASSESSMENT & PLAN NOTE
Admit to ICU  Patient on BiPAP, IP 16, EP 6, FiO2 40%  Monitor O2 sat  Continuous oxygen  Patient no longer in respiratory distress since on BiPAP  Levalbuterol/ipratropium nebulizations  Zosyn renally dosed, just because we are unable to see any infiltrates due to pulmonary/ vascular congestion  Could be due to fluid overload, ER already contacted Dr. Krause for dialysis orders

## 2019-10-18 NOTE — SUBJECTIVE & OBJECTIVE
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    both legs    CHF (congestive heart failure)     Encounter for blood transfusion 03/2016    Gout     Hypertension     Mitral valve regurgitation     Osteoarthritis     Pancreatitis     Peripheral vascular disease     Peritoneal dialysis catheter in place     Pneumonia 09/09/2017    Renal failure        Past Surgical History:   Procedure Laterality Date    ACHILLES TENDON SURGERY Right     APPENDECTOMY      CARDIAC CATHETERIZATION  07/03/2017    CHOLECYSTECTOMY      heal surgery Right     HYSTERECTOMY      PARATHYROIDECTOMY      PARATHYROIDECTOMY  07/13/2017    PERITONEAL CATHETER INSERTION      RENAL BIOPSY      vocal cord nodule         Review of patient's allergies indicates:  No Known Allergies    No current facility-administered medications on file prior to encounter.      Current Outpatient Medications on File Prior to Encounter   Medication Sig    aspirin 81 MG Chew Take 81 mg by mouth once daily.     diltiaZEM (CARDIZEM CD) 180 MG 24 hr capsule Take 180 mg by mouth once daily.    isosorbide mononitrate (IMDUR) 30 MG 24 hr tablet Take 30 mg by mouth once daily.    losartan (COZAAR) 25 MG tablet Take 25 mg by mouth once daily.    magnesium oxide (MAG-OX) 400 mg (241.3 mg magnesium) tablet Take 400 mg by mouth once daily.    metoprolol succinate (TOPROL-XL) 50 MG 24 hr tablet Take 1 tablet (50 mg total) by mouth once daily. (Patient taking differently: Take 50 mg by mouth once daily. )    predniSONE (DELTASONE) 10 MG tablet Take 30 mg (3 tabs) x5 days, take 20 mg (2 tabs) x5 days, take 10 mg (1 tab) x5 days    calcium acetate (PHOSLO) 667 mg capsule Take 1,334 mg by mouth 3 (three) times daily with meals.    diltiaZEM (CARDIZEM) 60 MG tablet Take 60 mg by mouth 3 (three) times daily.    fluticasone furoate-vilanterol (BREO) 100-25 mcg/dose diskus inhaler Inhale 1 puff into the lungs once daily.  Controller    levETIRAcetam (KEPPRA) 500 MG Tab Take 500 mg by mouth every Mon, Wed, Fri. After diaylsis    ondansetron (ZOFRAN-ODT) 4 MG TbDL Take 1 tablet (4 mg total) by mouth every 6 (six) hours as needed.    oxyCODONE-acetaminophen (PERCOCET) 5-325 mg per tablet Take 1 tablet by mouth daily as needed for Pain (with dialysis).     sevelamer HCl (RENAGEL) 800 MG Tab Take 1 tablet (800 mg total) by mouth 3 (three) times daily with meals.    traMADol (ULTRAM) 50 mg tablet Take 1 tablet (50 mg total) by mouth 3 (three) times a week. 3 TIMES A WEEK AS NEEDED DURING HEMODIALYSIS     Family History     Problem Relation (Age of Onset)    Arthritis Mother    Diabetes Mother, Father    Early death Sister, Sister    Heart disease Sister, Maternal Grandfather, Mother    Heart failure Mother    Hypertension Mother        Tobacco Use    Smoking status: Current Some Day Smoker     Packs/day: 0.50     Years: 45.00     Pack years: 22.50    Smokeless tobacco: Never Used   Substance and Sexual Activity    Alcohol use: No    Drug use: No    Sexual activity: Not on file     Review of Systems   Constitutional: Positive for appetite change (Loss of appetite lately), diaphoresis and fatigue. Negative for activity change, chills and fever.        Information given by the sister in the room on her bedside   HENT:        Dry mouth   Eyes: Negative.    Respiratory: Positive for chest tightness, shortness of breath and wheezing. Negative for cough and choking.    Cardiovascular: Positive for chest pain and palpitations. Negative for leg swelling.   Gastrointestinal: Negative for abdominal distention, abdominal pain, blood in stool, constipation, nausea and vomiting.   Endocrine: Negative.    Genitourinary: Negative.    Musculoskeletal:        Leg pains due to calciphylaxis   Skin: Positive for color change.        Calciphylaxis bilateral leg   Allergic/Immunologic: Negative.    Neurological: Positive for weakness and  light-headedness.   Hematological: Negative.    Psychiatric/Behavioral: Negative.      Objective:     Vital Signs (Most Recent):  Temp: 98 °F (36.7 °C) (10/18/19 1044)  Pulse: 81 (10/18/19 1348)  Resp: 16 (10/18/19 1348)  BP: (!) 153/70 (10/18/19 1348)  SpO2: 100 % (10/18/19 1348) Vital Signs (24h Range):  Temp:  [98 °F (36.7 °C)] 98 °F (36.7 °C)  Pulse:  [] 81  Resp:  [16-30] 16  SpO2:  [98 %-100 %] 100 %  BP: (125-177)/(70-89) 153/70     Weight: 56.7 kg (125 lb)  Body mass index is 21.8 kg/m².    Physical Exam   Constitutional: She is oriented to person, place, and time. No distress.   Cachectic, sallow complexion, on BiPAP   HENT:   Head: Normocephalic and atraumatic.   Eyes: Conjunctivae and EOM are normal.   Neck: Normal range of motion. Neck supple.   Cardiovascular: Exam reveals no gallop and no friction rub.   No murmur heard.  Irregularly irregular, tachycardic  Unable to appreciate dorsalis pedis bilaterally   Pulmonary/Chest: Effort normal. No respiratory distress. She has rales.   Examined anteriorly, but BiPAP sounds more prominent that breath sounds,   Decreased breath sounds in bases   Abdominal: Soft. Bowel sounds are normal.   Musculoskeletal: She exhibits no edema or deformity.   Tender pretibial regions   Neurological: She is alert and oriented to person, place, and time. No cranial nerve deficit.   Skin: Skin is dry. No rash noted.   Calciphylaxis   Psychiatric: She has a normal mood and affect. Her behavior is normal.   Nursing note and vitals reviewed.        CRANIAL NERVES     CN III, IV, VI   Extraocular motions are normal.        Significant Labs:   ABGs:   Recent Labs   Lab 10/18/19  1147   PH 7.251*   PCO2 36.0   HCO3 15.8*   POCSATURATED 99   BE -11     Blood Culture: No results for input(s): LABBLOO in the last 48 hours.  CBC:   Recent Labs   Lab 10/18/19  1125 10/18/19  1127   WBC 10.95  --    HGB 13.2  --    HCT 42.6 46     --      CMP:   Recent Labs   Lab 10/18/19  1219       K 4.9   CL 91*   CO2 17*   *   CALCIUM 7.1*   ANIONGAP 32*     Cardiac Markers:   Recent Labs   Lab 10/18/19  1125   BNP >4,500*     Lactic Acid:   Recent Labs   Lab 10/18/19  1157   LACTATE 6.3*     Magnesium: No results for input(s): MG in the last 48 hours.  Troponin:   Recent Labs   Lab 10/18/19  1214   TROPONINI 0.092*     TSH:   Recent Labs   Lab 09/29/19  1049   TSH 3.155     Urine Studies: No results for input(s): COLORU, APPEARANCEUA, PHUR, SPECGRAV, PROTEINUA, GLUCUA, KETONESU, BILIRUBINUA, OCCULTUA, NITRITE, UROBILINOGEN, LEUKOCYTESUR, RBCUA, WBCUA, BACTERIA, SQUAMEPITHEL, HYALINECASTS in the last 48 hours.    Invalid input(s): WRIGHTSUR    Significant Imaging: I have reviewed all pertinent imaging results/findings within the past 24 hours.     X-ray chest shows stable moderate cardiomegaly and enlarged central pulmonary vasculature, with enlarged PA trunk.  Mild scattered interstitial opacities in both lungs, with small bilateral pleural effusion  Nonspecific right lower lung nodular opacities, potentially lobulated pleural fluid.  Follow-up PA and lateral chest radiograph is recommended when clinically feasible  No evidence of active cardiopulmonary disease

## 2019-10-18 NOTE — CONSULTS
[facility]  Cardiology  Consult Note    Patient Name: Griselda Neely  MRN: [id number]  Admission Date: 10/18/2019  Hospital Length of Stay: 0 days  Code Status: DNR   Attending Provider: Twan Yu MD   Consulting Provider: Alta Travis NP  Primary Care Physician: Kalie Neely MD  Principal Problem:Acute on chronic respiratory failure    Patient information was obtained from patient, past medical records and ER records.     Inpatient consult to Cardiology  Consult performed by: Alta Travis NP  Consult ordered by: Twan Yu MD        Subjective:     Chief Complaint:  74-year-old  female was known to the service, known history of AFib, congestive heart failures, end-stage renal disease on HD  and Friday, COPD and continued to smoke, severe peripheral vascular disease, calciphylaxis, history of blood transfusion, gout, hypertension, hyperlipidemia presented to the ER with complaint of chest pain and severe shortness of breath.  Upon arrival to the ER patient O2 sats were 60% heart rate was 130 and she had bilateral rales.  On Accu-Chek her blood glucose was 20 (past medical history shows diabetes but sister denies history of diabetes, reporting she does not take any pills or insulin).  Sister report she was finely last night, and this morning she woke up with complaint of not feeling well.  Sister also informed that patient was planning to skip her dialysis today to attend her cousin's  , but had to come to ER because of her condition and difficulty breathing.  Patient was in respiratory distress and was started on BiPAP of IP 16, a P 6 and FiO2 40% with improvement in her O2 sat to %  Patient was in AFib with RVR upon arrival in the ER, she was given Cardizem loading dose and started on Cardizem drip  Patient had bilateral rales and clearly in heart failure, BNP was > 4500.   Lactic acid 6.3, patient also has metabolic acidosis, could  possibly due to respiratory distress but at this time infection cannot be ruled out, not convincing about sepsis  Nephrology Dr. gibson was consulted through ER for urgent dialysis orders  Cardiology will be consulted Dr. Powell  Patient will be admitted to ICU for close monitoring    HPI: Ms. Neely presented to the ER today with respiratory distress and afib with RVR. She was found by her son this morning to be weak and unable to ambulate as usual. She apparently had some confusion and was noted to have a blood glucose of 20 despite no history of diabetes. She is not on hypoglycemic meds. Patient was admitted to the ICU on Bipap and cardizem drip. However she is now on nasal cannula in no acute distress. She remains in Afib rate controlled and will be weaned off cardizem drip.     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    both legs    CHF (congestive heart failure)     Encounter for blood transfusion 03/2016    Gout     Hypertension     Mitral valve regurgitation     Osteoarthritis     Pancreatitis     Peripheral vascular disease     Peritoneal dialysis catheter in place     Pneumonia 09/09/2017    Renal failure        Past Surgical History:   Procedure Laterality Date    ACHILLES TENDON SURGERY Right     APPENDECTOMY      CARDIAC CATHETERIZATION  07/03/2017    CHOLECYSTECTOMY      heal surgery Right     HYSTERECTOMY      PARATHYROIDECTOMY      PARATHYROIDECTOMY  07/13/2017    PERITONEAL CATHETER INSERTION      RENAL BIOPSY      vocal cord nodule         Review of patient's allergies indicates:  No Known Allergies    No current facility-administered medications on file prior to encounter.      Current Outpatient Medications on File Prior to Encounter   Medication Sig    aspirin 81 MG Chew Take 81 mg by mouth once daily.     diltiaZEM (CARDIZEM CD) 180 MG 24 hr capsule Take 180 mg by mouth once daily.    isosorbide mononitrate (IMDUR) 30 MG 24 hr  tablet Take 30 mg by mouth once daily.    losartan (COZAAR) 25 MG tablet Take 25 mg by mouth once daily.    magnesium oxide (MAG-OX) 400 mg (241.3 mg magnesium) tablet Take 400 mg by mouth once daily.    metoprolol succinate (TOPROL-XL) 50 MG 24 hr tablet Take 1 tablet (50 mg total) by mouth once daily. (Patient taking differently: Take 50 mg by mouth once daily. )    predniSONE (DELTASONE) 10 MG tablet Take 30 mg (3 tabs) x5 days, take 20 mg (2 tabs) x5 days, take 10 mg (1 tab) x5 days    calcium acetate (PHOSLO) 667 mg capsule Take 1,334 mg by mouth 3 (three) times daily with meals.    diltiaZEM (CARDIZEM) 60 MG tablet Take 60 mg by mouth 3 (three) times daily.    fluticasone furoate-vilanterol (BREO) 100-25 mcg/dose diskus inhaler Inhale 1 puff into the lungs once daily. Controller    levETIRAcetam (KEPPRA) 500 MG Tab Take 500 mg by mouth every Mon, Wed, Fri. After diaylsis    ondansetron (ZOFRAN-ODT) 4 MG TbDL Take 1 tablet (4 mg total) by mouth every 6 (six) hours as needed.    oxyCODONE-acetaminophen (PERCOCET) 5-325 mg per tablet Take 1 tablet by mouth daily as needed for Pain (with dialysis).     sevelamer HCl (RENAGEL) 800 MG Tab Take 1 tablet (800 mg total) by mouth 3 (three) times daily with meals.    traMADol (ULTRAM) 50 mg tablet Take 1 tablet (50 mg total) by mouth 3 (three) times a week. 3 TIMES A WEEK AS NEEDED DURING HEMODIALYSIS     Family History     Problem Relation (Age of Onset)    Arthritis Mother    Diabetes Mother, Father    Early death Sister, Sister    Heart disease Sister, Maternal Grandfather, Mother    Heart failure Mother    Hypertension Mother        Tobacco Use    Smoking status: Current Some Day Smoker     Packs/day: 0.50     Years: 45.00     Pack years: 22.50    Smokeless tobacco: Never Used   Substance and Sexual Activity    Alcohol use: No    Drug use: No    Sexual activity: Not on file     ROS     Review of Systems   Constitution: Negative for diaphoresis and  fever. Fatigue and weakness this morning. Poor appetite- did not eat yesterday.   HENT: Negative for nosebleeds.    Cardiovascular: Negative for chest pain, dyspnea on exertion, leg swelling and palpitations.   Respiratory: Negative for shortness of breath and wheezing.    Hematologic/Lymphatic: Negative for bleeding problem. Does not bruise/bleed easily.   Skin: Negative for color change and rash.   Musculoskeletal: Negative for falls and myalgias.   Gastrointestinal: Negative for hematemesis and hematochezia. Positive for vomiting x 1 episode last night.   Genitourinary: Negative for hematuria.   Neurological: Negative for dizziness and light-headedness.   Psychiatric/Behavioral: Negative for altered mental status and memory loss.       Objective:     Vital Signs (Most Recent):  Temp: 97.5 °F (36.4 °C) (10/18/19 1530)  Pulse: 73 (10/18/19 1530)  Resp: 14 (10/18/19 1530)  BP: (!) 152/80 (10/18/19 1530)  SpO2: (!) 92 % (10/18/19 1530) Vital Signs (24h Range):  Temp:  [97.5 °F (36.4 °C)-98 °F (36.7 °C)] 97.5 °F (36.4 °C)  Pulse:  [] 73  Resp:  [14-30] 14  SpO2:  [92 %-100 %] 92 %  BP: (125-177)/(70-89) 152/80     Weight: 56.7 kg (125 lb)  Body mass index is 21.8 kg/m².    SpO2: (!) 92 %  O2 Device (Oxygen Therapy): nasal cannula      Intake/Output Summary (Last 24 hours) at 10/18/2019 1558  Last data filed at 10/18/2019 1348  Gross per 24 hour   Intake 50 ml   Output --   Net 50 ml       Lines/Drains/Airways     Drain                 Hemodialysis AV Fistula 08/08/19 0214 Right upper arm 71 days          Peripheral Intravenous Line                 Peripheral IV - Single Lumen 10/18/19 1300 20 G Left;Posterior Hand less than 1 day         Peripheral IV - Single Lumen 10/18/19 1300 Left Antecubital less than 1 day                Physical Exam     HEENT: Normocephalic, atraumatic,   PERRL, Conjunctiva pink, no scleral icterus.   NECK:  No JVD no carotid bruit  CVS: S1S2+, irregular rate and rhythm, + murmur, rubs  or gallops, JVP: Normal.  LUNGS: Rales at bases  ABDOMEN: Soft, NT, BS+  EXTREMITIES: No cyanosis, clubbing or edema  Gu: No núñez catheter, denies dysuria, no hematuria- on HD  NEURO: AAO X to person and place  PSY : Flat affect       Significant Labs:   Recent Lab Results       10/18/19  1214   10/18/19  1214   10/18/19  1157   10/18/19  1147   10/18/19  1137        Albumin   2.8           Alkaline Phosphatase   347           Allens Test       Pass       ALT   194           Anion Gap   32           AST   351           Baso #               Basophil%               BILIRUBIN TOTAL   2.4  Comment:  For infants and newborns, interpretation of results should be based  on gestational age, weight and in agreement with clinical  observations.  Premature Infant recommended reference ranges:  Up to 24 hours.............<8.0 mg/dL  Up to 48 hours............<12.0 mg/dL  3-5 days..................<15.0 mg/dL  6-29 days.................<15.0 mg/dL             BNP               Site       LR       BUN, Bld   49           Calcium   7.1           Chloride   91           CO2   17           Creatinine   5.6           DelSys       CPAP/BiPAP       Differential Method               eGFR if    8.0           eGFR if non    7.0  Comment:  Calculation used to obtain the estimated glomerular filtration  rate (eGFR) is the CKD-EPI equation.              Eos #               Eosinophil%               EP       6       FiO2       40       Glucose   112           Gran # (ANC)               Gran%               Hematocrit               Hemoglobin               Immature Grans (Abs)               Immature Granulocytes               Coumadin Monitoring INR               IP       16       Lactate, Ned     6.3  Comment:  Falsely low lactic acid results can be found in samples   containing >=13.0 mg/dL total bilirubin and/or >=3.5 mg/dL   direct bilirubin.  Results confirmed, test repeated  Lactic Acid critical result(s)  called and verbal readback obtained   from Ilene Vuong, RN/ED, 10/18/2019 12:50           Lymph #               Lymph%               MCH               MCHC               MCV               Min Vol       8       Mode       BiPAP       Mono #               Mono%               MPV               nRBC               Platelets               POC Anion Gap               POC BE       -11       POC BUN               POC Chloride               POC Creatinine               POC Glucose 100       94     POC HCO3       15.8       POC Hematocrit               POC Ionized Calcium               POC Lactate               POC PCO2       36.0       POC PH       7.251       POC PO2       138       POC Potassium               POC SATURATED O2       99       POC Sodium               POC TCO2       17       POC TCO2 (MEASURED)               Potassium   4.9           PROTEIN TOTAL   6.1           PT               Rate       16       RBC               RDW               Sample       ARTERIAL       Sodium   140           Sp02       100       Spont Rate       20       Troponin I   0.092  Comment:  Troponin critical result(s) called and verbal readback obtained from   Jayleen Kilgore, RN/ED, 10/18/2019 14:20             WBC                                10/18/19  1127   10/18/19  1125   10/18/19  1123        Albumin           Alkaline Phosphatase           Allens Test           ALT           Anion Gap           AST           Baso #   0.02       Basophil%   0.2       BILIRUBIN TOTAL           BNP   >4,500  Comment:  Values of less than 100 pg/ml are consistent with non-CHF populations.       Site           BUN, Bld           Calcium           Chloride           CO2           Creatinine           DelSys           Differential Method   Automated       eGFR if            eGFR if non            Eos #   0.0       Eosinophil%   0.0       EP           FiO2           Glucose           Gran # (ANC)   8.9       Gran%   80.9        Hematocrit   42.6       Hemoglobin   13.2       Immature Grans (Abs)   0.09  Comment:  Mild elevation in immature granulocytes is non specific and   can be seen in a variety of conditions including stress response,   acute inflammation, trauma and pregnancy. Correlation with other   laboratory and clinical findings is essential.         Immature Granulocytes   0.8       Coumadin Monitoring INR   1.7  Comment:  Coumadin Therapy:  INR: 2.0-3.0 conventional anticoagulation  INR: 2.5-3.5 intensive anticoagulation         IP           Lactate, Ned           Lymph #   0.9       Lymph%   7.9       MCH   29.2       MCHC   31.0       MCV   94       Min Vol           Mode           Mono #   1.1       Mono%   10.2       MPV   10.8       nRBC   1       Platelets   158       POC Anion Gap 23         POC BE           POC BUN 45         POC Chloride 99         POC Creatinine 6.5         POC Glucose <20         POC HCO3           POC Hematocrit 46         POC Ionized Calcium 0.76         POC Lactate     8.35     POC PCO2           POC PH           POC PO2           POC Potassium 5.0         POC SATURATED O2           POC Sodium 134         POC TCO2           POC TCO2 (MEASURED) 18         Potassium           PROTEIN TOTAL           PT   18.9       Rate           RBC   4.52       RDW   19.9       Sample VENOUS   VENOUS     Sodium           Sp02           Spont Rate           Troponin I           WBC   10.95             Significant Imaging:    Impression:       1. Stable moderate cardiomegaly and enlarged central pulmonary vasculature, with enlarged PA trunk.  2. Mild scattered interstitial opacities in both lungs, with small bilateral pleural effusions.  3. Nonspecific right lower lung nodular opacities, potentially loculated pleural fluid.  Follow-up PA and lateral chest radiograph is recommended when clinically feasible.      No evidence of active cardiopulmonary disease.       Assessment and Plan:     IMPRESSION:    1. Afib  with RVR- now rate controlled  2. Acute on chronic respiratory failure- improved  3. Elevated troponin- demand ischemia  4. Hypoglycemic episode  5. Metabolic acidosis  6. HTN  7. Tobacco dependence  8. COPD    PLAN:     1. She is now afib rate controlled. Patient has known history of persistent afib. Wean off cardizem and resume oral cardizem. Patient to receive other home meds as well.   2. Patient's breathing has significantly improved. She is volume overloaded but is due for dialysis. Nephrology has been consulted.   3. Troponin is elevated but is likely due to demand ischemia. Will trend. Patient has no complaints of chest pain at this time.   4. Patient has no known history of diabetes but was significantly hypoglycemic upon arrival. She reports she had not eaten yesterday and had an episode of vomiting last night. Recommend close glucose monitoring inpatient.   5. Further recommendations based on hospital course.         Active Diagnoses:    Diagnosis Date Noted POA    PRINCIPAL PROBLEM:  Acute on chronic respiratory failure [J96.20] 10/18/2019 Yes     Chronic    Metabolic acidosis [E87.2] 10/18/2019 Yes    Volume overload [E87.70] 10/18/2019 Yes    Chest pain [R07.9] 10/18/2019 Unknown    DNR (do not resuscitate) [Z66] 09/30/2019 Yes     Chronic    Oxygen dependent [Z99.81] 05/13/2019 Not Applicable    A-fib with RVR [I48.91] 07/18/2017 Yes    Calciphylaxis [E83.59] 07/18/2017 Yes    ESRD (end stage renal disease) on HD M,W, F [N18.6] 05/11/2016 Yes    Chronic obstructive pulmonary disease [J44.9] 10/19/2015 Yes    Tobacco dependence [F17.200] 09/09/2013 Yes    HTN (hypertension) [I10] 08/15/2013 Yes      Problems Resolved During this Admission:       VTE Risk Mitigation (From admission, onward)         Ordered     enoxaparin injection 40 mg  Daily      10/18/19 1551     IP VTE HIGH RISK PATIENT  Once      10/18/19 1551                Thank you for your consult. I will follow-up with patient.  Please contact us if you have any additional questions.    Alta Travis NP  Cardiology   [facility]

## 2019-10-18 NOTE — ASSESSMENT & PLAN NOTE
Differential diagnosis includes end-stage renal disease, acute respiratory failure  Lactic acid elevated at 6.3  Upon arrival to ER pH was 7.2, with bicarb is 17  Repeat ABG and monitor closely  May improve once the respiratory status is also improved after hemodialysis

## 2019-10-19 PROBLEM — I50.9 CONGESTIVE HEART FAILURE: Status: ACTIVE | Noted: 2019-10-19

## 2019-10-19 PROBLEM — E87.20 METABOLIC ACIDOSIS: Status: RESOLVED | Noted: 2019-10-18 | Resolved: 2019-10-19

## 2019-10-19 PROBLEM — E87.70 VOLUME OVERLOAD: Status: RESOLVED | Noted: 2019-10-18 | Resolved: 2019-10-19

## 2019-10-19 PROBLEM — J96.21 ACUTE ON CHRONIC RESPIRATORY FAILURE WITH HYPOXIA: Status: ACTIVE | Noted: 2019-10-18

## 2019-10-19 PROBLEM — J81.1 PULMONARY EDEMA: Status: ACTIVE | Noted: 2019-10-19

## 2019-10-19 PROBLEM — E87.20 LACTIC ACIDOSIS: Status: ACTIVE | Noted: 2019-10-19

## 2019-10-19 PROBLEM — R58 BLEEDING: Status: ACTIVE | Noted: 2019-10-19

## 2019-10-19 PROBLEM — R74.01 TRANSAMINITIS: Status: ACTIVE | Noted: 2019-10-19

## 2019-10-19 LAB
ALBUMIN SERPL BCP-MCNC: 2.7 G/DL (ref 3.5–5.2)
ALP SERPL-CCNC: 320 U/L (ref 55–135)
ALT SERPL W/O P-5'-P-CCNC: 290 U/L (ref 10–44)
ANION GAP SERPL CALC-SCNC: 20 MMOL/L (ref 8–16)
AST SERPL-CCNC: 446 U/L (ref 10–40)
BASOPHILS # BLD AUTO: 0 K/UL (ref 0–0.2)
BASOPHILS NFR BLD: 0 % (ref 0–1.9)
BILIRUB SERPL-MCNC: 1.8 MG/DL (ref 0.1–1)
BUN SERPL-MCNC: 29 MG/DL (ref 8–23)
CALCIUM SERPL-MCNC: 7.4 MG/DL (ref 8.7–10.5)
CHLORIDE SERPL-SCNC: 91 MMOL/L (ref 95–110)
CHOLEST SERPL-MCNC: 155 MG/DL (ref 120–199)
CHOLEST/HDLC SERPL: 2.8 {RATIO} (ref 2–5)
CK MB SERPL-MCNC: 2.8 NG/ML (ref 0.1–6.5)
CO2 SERPL-SCNC: 26 MMOL/L (ref 23–29)
CREAT SERPL-MCNC: 3.9 MG/DL (ref 0.5–1.4)
DIFFERENTIAL METHOD: ABNORMAL
EOSINOPHIL # BLD AUTO: 0 K/UL (ref 0–0.5)
EOSINOPHIL NFR BLD: 0.2 % (ref 0–8)
ERYTHROCYTE [DISTWIDTH] IN BLOOD BY AUTOMATED COUNT: 18.8 % (ref 11.5–14.5)
EST. GFR  (AFRICAN AMERICAN): 12.5 ML/MIN/1.73 M^2
EST. GFR  (NON AFRICAN AMERICAN): 10.8 ML/MIN/1.73 M^2
ESTIMATED AVG GLUCOSE: 97 MG/DL (ref 68–131)
GLUCOSE SERPL-MCNC: 130 MG/DL (ref 70–110)
GLUCOSE SERPL-MCNC: 162 MG/DL (ref 70–110)
GLUCOSE SERPL-MCNC: 61 MG/DL (ref 70–110)
HBA1C MFR BLD HPLC: 5 % (ref 4.5–6.2)
HCT VFR BLD AUTO: 32.4 % (ref 37–48.5)
HDLC SERPL-MCNC: 56 MG/DL (ref 40–75)
HDLC SERPL: 36.1 % (ref 20–50)
HGB BLD-MCNC: 10.8 G/DL (ref 12–16)
IMM GRANULOCYTES # BLD AUTO: 0.02 K/UL (ref 0–0.04)
IMM GRANULOCYTES NFR BLD AUTO: 0.3 % (ref 0–0.5)
LACTATE SERPL-SCNC: 2 MMOL/L (ref 0.5–1.9)
LACTATE SERPL-SCNC: 2.7 MMOL/L (ref 0.5–1.9)
LDLC SERPL CALC-MCNC: 82.6 MG/DL (ref 63–159)
LYMPHOCYTES # BLD AUTO: 0.7 K/UL (ref 1–4.8)
LYMPHOCYTES NFR BLD: 9.9 % (ref 18–48)
MAGNESIUM SERPL-MCNC: 1.9 MG/DL (ref 1.6–2.6)
MCH RBC QN AUTO: 29.6 PG (ref 27–31)
MCHC RBC AUTO-ENTMCNC: 33.3 G/DL (ref 32–36)
MCV RBC AUTO: 89 FL (ref 82–98)
MONOCYTES # BLD AUTO: 0.4 K/UL (ref 0.3–1)
MONOCYTES NFR BLD: 6.1 % (ref 4–15)
NEUTROPHILS # BLD AUTO: 5.5 K/UL (ref 1.8–7.7)
NEUTROPHILS NFR BLD: 83.5 % (ref 38–73)
NONHDLC SERPL-MCNC: 99 MG/DL
NRBC BLD-RTO: 1 /100 WBC
PHOSPHATE SERPL-MCNC: 6.2 MG/DL (ref 2.7–4.5)
PLATELET # BLD AUTO: 123 K/UL (ref 150–350)
PMV BLD AUTO: 11 FL (ref 9.2–12.9)
POTASSIUM SERPL-SCNC: 4.7 MMOL/L (ref 3.5–5.1)
PROCALCITONIN SERPL IA-MCNC: 1.94 NG/ML (ref 0–0.5)
PROT SERPL-MCNC: 5.8 G/DL (ref 6–8.4)
RBC # BLD AUTO: 3.65 M/UL (ref 4–5.4)
SODIUM SERPL-SCNC: 137 MMOL/L (ref 136–145)
TRIGL SERPL-MCNC: 82 MG/DL (ref 30–150)
TROPONIN I SERPL DL<=0.01 NG/ML-MCNC: 0.12 NG/ML (ref 0.02–0.04)
WBC # BLD AUTO: 6.58 K/UL (ref 3.9–12.7)

## 2019-10-19 PROCEDURE — 83605 ASSAY OF LACTIC ACID: CPT

## 2019-10-19 PROCEDURE — 25000003 PHARM REV CODE 250: Performed by: INTERNAL MEDICINE

## 2019-10-19 PROCEDURE — 84100 ASSAY OF PHOSPHORUS: CPT

## 2019-10-19 PROCEDURE — 84145 PROCALCITONIN (PCT): CPT

## 2019-10-19 PROCEDURE — 25000003 PHARM REV CODE 250

## 2019-10-19 PROCEDURE — 63600175 PHARM REV CODE 636 W HCPCS: Performed by: INTERNAL MEDICINE

## 2019-10-19 PROCEDURE — 83605 ASSAY OF LACTIC ACID: CPT | Mod: 91

## 2019-10-19 PROCEDURE — 21400001 HC TELEMETRY ROOM

## 2019-10-19 PROCEDURE — 83735 ASSAY OF MAGNESIUM: CPT

## 2019-10-19 PROCEDURE — 83036 HEMOGLOBIN GLYCOSYLATED A1C: CPT

## 2019-10-19 PROCEDURE — 80074 ACUTE HEPATITIS PANEL: CPT

## 2019-10-19 PROCEDURE — 84484 ASSAY OF TROPONIN QUANT: CPT

## 2019-10-19 PROCEDURE — 82553 CREATINE MB FRACTION: CPT

## 2019-10-19 PROCEDURE — 25000242 PHARM REV CODE 250 ALT 637 W/ HCPCS: Performed by: INTERNAL MEDICINE

## 2019-10-19 PROCEDURE — 80053 COMPREHEN METABOLIC PANEL: CPT

## 2019-10-19 PROCEDURE — 94640 AIRWAY INHALATION TREATMENT: CPT

## 2019-10-19 PROCEDURE — 94761 N-INVAS EAR/PLS OXIMETRY MLT: CPT

## 2019-10-19 PROCEDURE — 85025 COMPLETE CBC W/AUTO DIFF WBC: CPT

## 2019-10-19 PROCEDURE — 36415 COLL VENOUS BLD VENIPUNCTURE: CPT

## 2019-10-19 PROCEDURE — 27000221 HC OXYGEN, UP TO 24 HOURS

## 2019-10-19 PROCEDURE — 99900035 HC TECH TIME PER 15 MIN (STAT)

## 2019-10-19 PROCEDURE — 80061 LIPID PANEL: CPT

## 2019-10-19 RX ORDER — FAMOTIDINE 20 MG/1
20 TABLET, FILM COATED ORAL DAILY
Status: DISCONTINUED | OUTPATIENT
Start: 2019-10-19 | End: 2019-10-22 | Stop reason: HOSPADM

## 2019-10-19 RX ORDER — LEVALBUTEROL INHALATION SOLUTION 0.63 MG/3ML
0.63 SOLUTION RESPIRATORY (INHALATION) EVERY 6 HOURS PRN
Status: DISCONTINUED | OUTPATIENT
Start: 2019-10-19 | End: 2019-10-22 | Stop reason: HOSPADM

## 2019-10-19 RX ADMIN — FLUTICASONE FUROATE AND VILANTEROL TRIFENATATE 1 PUFF: 100; 25 POWDER RESPIRATORY (INHALATION) at 08:10

## 2019-10-19 RX ADMIN — LEVALBUTEROL HYDROCHLORIDE 0.63 MG: 0.63 SOLUTION RESPIRATORY (INHALATION) at 07:10

## 2019-10-19 RX ADMIN — ISOSORBIDE MONONITRATE 30 MG: 30 TABLET, EXTENDED RELEASE ORAL at 08:10

## 2019-10-19 RX ADMIN — MUPIROCIN: 20 OINTMENT TOPICAL at 08:10

## 2019-10-19 RX ADMIN — POLYETHYLENE GLYCOL 3350 17 G: 17 POWDER, FOR SOLUTION ORAL at 08:10

## 2019-10-19 RX ADMIN — CALCIUM ACETATE 1334 MG: 667 CAPSULE ORAL at 08:10

## 2019-10-19 RX ADMIN — CALCIUM ACETATE 1334 MG: 667 CAPSULE ORAL at 07:10

## 2019-10-19 RX ADMIN — DILTIAZEM HYDROCHLORIDE 180 MG: 180 CAPSULE, COATED, EXTENDED RELEASE ORAL at 08:10

## 2019-10-19 RX ADMIN — METOPROLOL SUCCINATE 50 MG: 50 TABLET, FILM COATED, EXTENDED RELEASE ORAL at 08:10

## 2019-10-19 RX ADMIN — FAMOTIDINE 20 MG: 20 TABLET ORAL at 09:10

## 2019-10-19 RX ADMIN — MAGNESIUM OXIDE 400 MG: 400 TABLET ORAL at 08:10

## 2019-10-19 RX ADMIN — ONDANSETRON 4 MG: 2 INJECTION INTRAMUSCULAR; INTRAVENOUS at 10:10

## 2019-10-19 RX ADMIN — ASPIRIN 81 MG 81 MG: 81 TABLET ORAL at 08:10

## 2019-10-19 RX ADMIN — PIPERACILLIN AND TAZOBACTAM 3.38 G: 3; .375 INJECTION, POWDER, LYOPHILIZED, FOR SOLUTION INTRAVENOUS; PARENTERAL at 12:10

## 2019-10-19 RX ADMIN — CHLORHEXIDINE GLUCONATE 15 ML: 1.2 RINSE ORAL at 08:10

## 2019-10-19 RX ADMIN — ENOXAPARIN SODIUM 30 MG: 100 INJECTION SUBCUTANEOUS at 07:10

## 2019-10-19 RX ADMIN — LOSARTAN POTASSIUM 25 MG: 25 TABLET, FILM COATED ORAL at 08:10

## 2019-10-19 RX ADMIN — SEVELAMER CARBONATE 800 MG: 800 TABLET, FILM COATED ORAL at 08:10

## 2019-10-19 RX ADMIN — CALCIUM ACETATE 1334 MG: 667 CAPSULE ORAL at 01:10

## 2019-10-19 NOTE — ASSESSMENT & PLAN NOTE
Known history of persistent atrial fibrillation, valvular, refused anticoagulation previously.    On admission AFib with RVR, likely driven by acute pulmonary processes, was started on Cardizem, now rate controlled.  Resume home metoprolol and Cardizem.  Discontinue echocardiogram as recently performed July 2019.  Appreciate Cardiology input.

## 2019-10-19 NOTE — ASSESSMENT & PLAN NOTE
Elevated at 6.3 on admission, now down trended to 2.0.  Do not suspect sepsis, likely in the setting of systemic hypoxemia as well as atrial fibrillation with RVR.

## 2019-10-19 NOTE — NURSING
Report received from Guido. Arrived to unit via WC, transferred to bed without incident. NAD, safety maintained, family at bedside.

## 2019-10-19 NOTE — ASSESSMENT & PLAN NOTE
Episode of chest pain relieved by nitro prior to admission.  No further episodes.    Patient with a history of nonobstructive coronary artery disease on left heart catheterization March 2019

## 2019-10-19 NOTE — ASSESSMENT & PLAN NOTE
New on chart review. Normal on 10/07 with AST/ALT 47/50.  On admission T bilirubin 2.4, AST/-400, also coagulopathy with INR 1.7.  Possibly in the setting of congestive hepatitis.  Endorsed emesis, dialysis patient.  Denies any offending medication.  Check acute viral hepatitis screen.  Right upper quadrant ultrasound.  Repeat CMP tomorrow.

## 2019-10-19 NOTE — PLAN OF CARE
10/19/19 0734   Patient Assessment/Suction   Level of Consciousness (AVPU) alert   Respiratory Effort Normal;Unlabored   Expansion/Accessory Muscles/Retractions no use of accessory muscles;no retractions   All Lung Fields Breath Sounds clear;diminished   Rhythm/Pattern, Respiratory unlabored;depth regular;pattern regular   PRE-TX-O2   O2 Device (Oxygen Therapy) nasal cannula   $ Is the patient on Low Flow Oxygen? Yes   Flow (L/min) 1   SpO2 98 %   Pulse Oximetry Type Continuous   $ Pulse Oximetry - Multiple Charge Pulse Oximetry - Multiple   Pulse 91   Resp 16   Aerosol Therapy   $ Aerosol Therapy Charges Aerosol Treatment   Daily Review of Necessity (SVN) completed   Respiratory Treatment Status (SVN) given   Treatment Route (SVN) mask;oxygen   Patient Position (SVN) semi-Clark's   Post Treatment Assessment (SVN) breath sounds unchanged   Signs of Intolerance (SVN) none   Breath Sounds Post-Respiratory Treatment   Post-treatment Heart Rate (beats/min) 86   Post-treatment Resp Rate (breaths/min) 17

## 2019-10-19 NOTE — CARE UPDATE
Nurse called around 10:30pm informed that post dialysis pt's fistula cont to bleed profusely and despite applying pressure for the last 1.5 hrs, its not stopping. Came to see the pt. Pt cont to bleed. I was informed that Dr Saenz is oncall vascular surgeon david. Spoke to Dr Saenz, informed he does not do dialysis fistula.   Charge nurse Shannon called Dr Loyola and spoke regarding the situation. Recommended to check with Dr Hill in ER if he can do, if he cannot then he will come out.   Dr Hill evaluated and stitched the small tear and hemostasis achieved at 10:58 pm.     Twan Yu MD  Department of Hospital Medicine   [facility]    [facility] [place]

## 2019-10-19 NOTE — PROGRESS NOTES
[facility]  Cardiology  Progress Note    Patient Name: Griselda Neely  MRN: [id number]  Admission Date: 10/18/2019  Hospital Length of Stay: 1 days  Code Status: DNR   Attending Physician: Lissa Portillo MD   Primary Care Physician: Kalie Neely MD  Expected Discharge Date:   Principal Problem:Acute on chronic respiratory failure with hypoxia    Subjective:     Hospital Course: dialysis overnight    Interval History: Patient resting in bed with no acute distress. States she is feeling much better today. Breathing is stable. Denies chest pain.     ROS   Review of Systems   Constitution: Negative for diaphoresis and fever. Reports she feels better.   HENT: Negative for nosebleeds.    Cardiovascular: Negative for chest pain, dyspnea on exertion, leg swelling and palpitations.   Respiratory: Negative for shortness of breath and wheezing.    Hematologic/Lymphatic: Negative for bleeding problem. Does not bruise/bleed easily.   Skin: Negative for color change and rash.   Musculoskeletal: Negative for falls and myalgias.   Gastrointestinal: Negative for hematemesis and hematochezia.   Genitourinary: Negative for hematuria.   Neurological: Negative for dizziness and light-headedness.   Psychiatric/Behavioral: Negative for altered mental status and memory loss.       Objective:     Vital Signs (Most Recent):  Temp: 97.7 °F (36.5 °C) (10/19/19 1133)  Pulse: 66 (10/19/19 1133)  Resp: 17 (10/19/19 1133)  BP: 117/63 (10/19/19 1133)  SpO2: 98 % (10/19/19 1133) Vital Signs (24h Range):  Temp:  [97.2 °F (36.2 °C)-98 °F (36.7 °C)] 97.7 °F (36.5 °C)  Pulse:  [] 66  Resp:  [12-44] 17  SpO2:  [91 %-100 %] 98 %  BP: ()/() 117/63     Weight: 57.6 kg (126 lb 15.8 oz)  Body mass index is 21.13 kg/m².    SpO2: 98 %  O2 Device (Oxygen Therapy): nasal cannula      Intake/Output Summary (Last 24 hours) at 10/19/2019 1205  Last data filed at 10/19/2019 0035  Gross per 24 hour   Intake 624.67 ml    Output 3500 ml   Net -2875.33 ml       Lines/Drains/Airways     Drain                 Hemodialysis AV Fistula 08/08/19 0214 Right upper arm 72 days          Peripheral Intravenous Line                 Peripheral IV - Single Lumen 10/18/19 1300 20 G Left;Posterior Hand less than 1 day         Peripheral IV - Single Lumen 10/18/19 1300 Left Antecubital less than 1 day                Physical Exam     HEENT: Normocephalic, atraumatic,   PERRL, Conjunctiva pink, no scleral icterus.   NECK:  No JVD no carotid bruit  CVS: S1S2+, irregular rate and rhythm, + murmur, rubs or gallops, JVP: Normal.  LUNGS: rhonchi throughout, breathing rate and rhythm stable  ABDOMEN: Soft, NT, BS+  EXTREMITIES: No cyanosis, clubbing or edema  Gu: No núñez catheter, denies dysuria, no hematuria- on HD  NEURO: AAO X to person and place  PSY : normal affect      Significant Labs:   Recent Lab Results       10/19/19  0843   10/19/19  0509   10/19/19  0027   10/19/19  0000   10/18/19  2001        Procalcitonin 1.94  Comment:  Procalcitonin Result Interpretation:  <=0.50 ng/mL  Systemic infection (sepsis) is not likely. Local bacterial infection   is   possible.  >0.50 and <= 2.00 ng/mL  Systemic infection (sepsis) is possible, but other conditions are   also known   to elevate procalcitonin.   >2.00 ng/mL  Systemic infection (sepsis) is likely unless other causes are known.  >=10.00 ng/mL  Important systemic inflammatory response, almost exclusively due to   severe   bacterial sepsis or septic shock.               Albumin   2.7           Alkaline Phosphatase   320           ALT   290           Anion Gap   20           AST   446           Baso #   0.00           Basophil%   0.0           BILIRUBIN TOTAL   1.8  Comment:  For infants and newborns, interpretation of results should be based  on gestational age, weight and in agreement with clinical  observations.  Premature Infant recommended reference ranges:  Up to 24 hours.............<8.0  mg/dL  Up to 48 hours............<12.0 mg/dL  3-5 days..................<15.0 mg/dL  6-29 days.................<15.0 mg/dL             BUN, Bld   29           Calcium   7.4           Chloride   91           Cholesterol   155  Comment:  The National Cholesterol Education Program (NCEP) has set the  following guidelines (reference ranges) for Cholesterol:  Optimal.....................<200 mg/dL  Borderline High.............200-239 mg/dL  High........................> or = 240 mg/dL             CO2   26           CPK MB       2.8 3.5     Creatinine   3.9           Differential Method   Automated           eGFR if    12.5           eGFR if non    10.8  Comment:  Calculation used to obtain the estimated glomerular filtration  rate (eGFR) is the CKD-EPI equation.              Eos #   0.0           Eosinophil%   0.2           Estimated Avg Glucose   97           Glucose   61           Gran # (ANC)   5.5           Gran%   83.5           HDL   56  Comment:  The National Cholesterol Education Program (NCEP) has set the  following guidelines (reference values) for HDL Cholesterol:  Low...............<40 mg/dL  Optimal...........>60 mg/dL             Hdl/Cholesterol Ratio   36.1           Hematocrit   32.4           Hemoglobin   10.8           Hemoglobin A1C External   5.0  Comment:  According to ADA guidelines, hemoglobin A1C <7.0% represents  optimal control in non-pregnant diabetic patients.  Different  metrics may apply to specific populations.   Standards of Medical Care in Diabetes - 2016.  For the purpose of screening for the presence of diabetes:  <5.7%     Consistent with the absence of diabetes  5.7-6.4%  Consistent with increasing risk for diabetes   (prediabetes)  >or=6.5%  Consistent with diabetes  Currently no consensus exists for use of hemoglobin A1C  for diagnosis of diabetes for children.             Immature Grans (Abs)   0.02  Comment:  Mild elevation in immature granulocytes  is non specific and   can be seen in a variety of conditions including stress response,   acute inflammation, trauma and pregnancy. Correlation with other   laboratory and clinical findings is essential.             Immature Granulocytes   0.3           Lactate, Ned   2.0  Comment:  Falsely low lactic acid results can be found in samples   containing >=13.0 mg/dL total bilirubin and/or >=3.5 mg/dL   direct bilirubin.  lactic acid  critical result(s) called and verbal readback obtained   from ivan bustos rn mi3, 10/19/2019 05:56     2.7  Comment:  Falsely low lactic acid results can be found in samples   containing >=13.0 mg/dL total bilirubin and/or >=3.5 mg/dL   direct bilirubin.  lactic acid  critical result(s) called and verbal readback obtained   from tania mac rn mi3, 10/19/2019 00:44         LDL Cholesterol External   82.6  Comment:  The National Cholesterol Education Program (NCEP) has set the  following guidelines (reference values) for LDL Cholesterol:  Optimal.......................<130 mg/dL  Borderline High...............130-159 mg/dL  High..........................160-189 mg/dL  Very High.....................>190 mg/dL             Lymph #   0.7           Lymph%   9.9           Magnesium   1.9           MCH   29.6           MCHC   33.3           MCV   89  Comment:  Delta check review           Mono #   0.4           Mono%   6.1           MPV   11.0           Non-HDL Cholesterol   99  Comment:  Risk category and Non-HDL cholesterol goals:  Coronary heart disease (CHD)or equivalent (10-year risk of CHD >20%):  Non-HDL cholesterol goal     <130 mg/dL  Two or more CHD risk factors and 10-year risk of CHD <= 20%:  Non-HDL cholesterol goal     <160 mg/dL  0 to 1 CHD risk factor:  Non-HDL cholesterol goal     <190 mg/dL             nRBC   1           Phosphorus   6.2           Platelets   123           POC Glucose               Potassium   4.7           PROTEIN TOTAL   5.8           RBC   3.65            RDW   18.8           Sodium   137           Total Cholesterol/HDL Ratio   2.8           Triglycerides   82  Comment:  The National Cholesterol Education Program (NCEP) has set the  following guidelines (reference values) for triglycerides:  Normal......................<150 mg/dL  Borderline High.............150-199 mg/dL  High........................200-499 mg/dL             Troponin I     0.116  Comment:  trop critical result(s) called and verbal readback obtained from   marge schwarz rn mi3, 10/19/2019 01:16           WBC   6.58                            10/18/19  1808   10/18/19  1806   10/18/19  1552   10/18/19  1214   10/18/19  1214        Procalcitonin               Albumin         2.8     Alkaline Phosphatase         347     ALT         194     Anion Gap         32     AST         351     Baso #               Basophil%               BILIRUBIN TOTAL         2.4  Comment:  For infants and newborns, interpretation of results should be based  on gestational age, weight and in agreement with clinical  observations.  Premature Infant recommended reference ranges:  Up to 24 hours.............<8.0 mg/dL  Up to 48 hours............<12.0 mg/dL  3-5 days..................<15.0 mg/dL  6-29 days.................<15.0 mg/dL       BUN, Bld         49     Calcium         7.1     Chloride         91     Cholesterol               CO2         17     CPK MB     2.9         Creatinine         5.6     Differential Method               eGFR if          8.0     eGFR if non          7.0  Comment:  Calculation used to obtain the estimated glomerular filtration  rate (eGFR) is the CKD-EPI equation.        Eos #               Eosinophil%               Estimated Avg Glucose               Glucose         112     Gran # (ANC)               Gran%               HDL               Hdl/Cholesterol Ratio               Hematocrit               Hemoglobin               Hemoglobin A1C External               Immature  Grans (Abs)               Immature Granulocytes               Lactate, Ned   2.5  Comment:  Falsely low lactic acid results can be found in samples   containing >=13.0 mg/dL total bilirubin and/or >=3.5 mg/dL   direct bilirubin.  lactic acid  critical result(s) called and verbal readback obtained   from desiree simental rn mi3, 10/18/2019 19:08   5.4  Comment:  Falsely low lactic acid results can be found in samples   containing >=13.0 mg/dL total bilirubin and/or >=3.5 mg/dL   direct bilirubin.  Lactic Acid critical result(s) called and verbal readback obtained   from Norman Amato RN/MICU3, 10/18/2019 16:58           LDL Cholesterol External               Lymph #               Lymph%               Magnesium               MCH               MCHC               MCV               Mono #               Mono%               MPV               Non-HDL Cholesterol               nRBC               Phosphorus               Platelets               POC Glucose 83     100       Potassium         4.9     PROTEIN TOTAL         6.1     RBC               RDW               Sodium         140     Total Cholesterol/HDL Ratio               Triglycerides               Troponin I   0.121  Comment:  trop  critical result(s) called and verbal readback obtained from   cole johnston rn mi3, 10/18/2019 19:03   0.112  Comment:  Results confirmed, test repeated  Troponin critical result(s) called and verbal readback obtained from   Heidi Lim RN/MICU3, 10/18/2019 16:48     0.092  Comment:  Troponin critical result(s) called and verbal readback obtained from   Jayleen Kilgore RN/ED, 10/18/2019 14:20       WBC                                    Significant Imaging:     Impression:       1. Stable moderate cardiomegaly and enlarged central pulmonary vasculature, with enlarged PA trunk.  2. Mild scattered interstitial opacities in both lungs, with small bilateral pleural effusions.  3. Nonspecific right lower lung nodular opacities, potentially loculated  pleural fluid.  Follow-up PA and lateral chest radiograph is recommended when clinically feasible.      No evidence of active cardiopulmonary disease.       Impression:       Minimal ascites    Prior cholecystectomy    Stable 7 mm hyperechoic focus within the left lobe of the liver suggestive of small hemangioma.    Atrophic right kidney         Assessment and Plan:     IMPRESSION:     1. Afib with RVR- now rate controlled  2. Acute on chronic respiratory failure- improved  3. Elevated troponin- demand ischemia  4. Hypoglycemic episode  5. Metabolic acidosis  6. HTN  7. Tobacco dependence  8. COPD    PLAN:    1. Patient has remained stable overnight from a cardiac perspective. She was dialyzed and appears to be breathing better. Denies chest pain.   2. Troponin is non diagnostic but likely related to Chronic Renal Failure and on Hemodialysis. Troponin stable overnight.   3. Will sign off for now.       Active Diagnoses:    Diagnosis Date Noted POA    PRINCIPAL PROBLEM:  Acute on chronic respiratory failure with hypoxia [J96.21] 10/18/2019 Yes    Congestive heart failure [I50.9] 10/19/2019 Yes    Pulmonary edema [J81.1] 10/19/2019 Yes    Transaminitis [R74.0] 10/19/2019 Yes    Bleeding from AV fistula [R58] 10/19/2019 No    Lactic acidosis [E87.2] 10/19/2019 Yes    Chest pain [R07.9] 10/18/2019 Yes    DNR (do not resuscitate) [Z66] 09/30/2019 Yes     Chronic    Hypoglycemia [E16.2] 08/06/2019 Unknown    A-fib with RVR [I48.91] 07/18/2017 Yes    Calciphylaxis [E83.59] 07/18/2017 Yes    ESRD (end stage renal disease) on HD M,W, F [N18.6] 05/11/2016 Yes    Chronic obstructive pulmonary disease [J44.9] 10/19/2015 Yes    Tobacco dependence [F17.200] 09/09/2013 Yes    HTN (hypertension) [I10] 08/15/2013 Yes      Problems Resolved During this Admission:    Diagnosis Date Noted Date Resolved POA    Metabolic acidosis [E87.2] 10/18/2019 10/19/2019 Yes    Volume overload [E87.70] 10/18/2019 10/19/2019 Yes        VTE Risk Mitigation (From admission, onward)         Ordered     enoxaparin injection 30 mg  Daily      10/18/19 1551     IP VTE HIGH RISK PATIENT  Once      10/18/19 5521                Alta Travis NP  Cardiology  [facility]

## 2019-10-19 NOTE — SUBJECTIVE & OBJECTIVE
Interval History:  Patient reports prior to admission she was feeling 'sick', chest pain which was relieved by nitro, shortness of breath, weak/generalized fatigue.  Denied missing any dialysis sessions.  Reports she had 3 episodes of nonbloody emesis. On admission she was placed of the BiPAP, had dialysis yesterday with 3.5 L removed, now off BiPAP on home O2 requirements and saturating well.  Atrial fibrillation now control, off Cardizem drip since last night.  Overnight post dialysis had uncontrolled bleeding from AV fistula, required suturing, she states she always bleds easily from the fistula.  Denies any sick contacts. She states she was previously feeling congested however was not able to expectorate any phlegm. Reports she always feels cold but no new chills or fever.    Review of Systems   Constitutional: Positive for chills. Negative for fever.   HENT: Negative for congestion and trouble swallowing.    Eyes: Negative for visual disturbance.   Respiratory: Positive for shortness of breath. Negative for cough and stridor.    Cardiovascular: Positive for chest pain. Negative for palpitations and leg swelling.   Gastrointestinal: Positive for nausea and vomiting. Negative for abdominal pain, constipation and diarrhea.   Musculoskeletal: Negative for neck stiffness.   Skin: Positive for wound.   Neurological: Positive for weakness.   Hematological: Bruises/bleeds easily.   Psychiatric/Behavioral: Negative for confusion.     Objective:     Vital Signs (Most Recent):  Temp: 97.8 °F (36.6 °C) (10/19/19 0300)  Pulse: 91 (10/19/19 0734)  Resp: 16 (10/19/19 0734)  BP: 117/64 (10/19/19 0500)  SpO2: 98 % (10/19/19 0734) Vital Signs (24h Range):  Temp:  [97.2 °F (36.2 °C)-98 °F (36.7 °C)] 97.8 °F (36.6 °C)  Pulse:  [] 91  Resp:  [12-44] 16  SpO2:  [91 %-100 %] 98 %  BP: (113-182)/() 117/64     Weight: 57.6 kg (126 lb 15.8 oz)  Body mass index is 21.13 kg/m².    Intake/Output Summary (Last 24 hours) at  10/19/2019 0834  Last data filed at 10/19/2019 0035  Gross per 24 hour   Intake 624.67 ml   Output 3500 ml   Net -2875.33 ml      Physical Exam   Constitutional: She is oriented to person, place, and time. No distress.   Chronically ill-appearing female, lying in bed, no apparent distress   HENT:   Head: Normocephalic and atraumatic.   Eyes: Right eye exhibits no discharge. Left eye exhibits no discharge.   Neck: Normal range of motion. Neck supple.   Cardiovascular: Normal rate.   Murmur heard.  Irregular heart rhythm, no significant lower extremity edema   Pulmonary/Chest: No stridor. No respiratory distress. She has no wheezes.   On supplemental oxygen via nasal cannula, not using accessory muscles of respiration, decreased air entry at bases   Abdominal: Soft. Bowel sounds are normal. She exhibits no distension. There is no tenderness. There is no guarding.   Genitourinary:   Genitourinary Comments: No Mauricio catheter   Neurological: She is alert and oriented to person, place, and time.   Moving all 4 extremities, generalized weakness   Skin: She is not diaphoretic.   Calciphylaxis, clean dry intact dressing overlying right upper extremity AVF   Psychiatric: She has a normal mood and affect.   Nursing note and vitals reviewed.      Significant Labs:   Bilirubin:   Recent Labs   Lab 09/29/19  1049 10/07/19  1012 10/18/19  1214 10/19/19  0509   BILITOT 0.7 1.0 2.4* 1.8*     CBC:   Recent Labs   Lab 10/18/19  1125 10/18/19  1127 10/19/19  0509   WBC 10.95  --  6.58   HGB 13.2  --  10.8*   HCT 42.6 46 32.4*     --  123*     CMP:   Recent Labs   Lab 10/18/19  1214 10/19/19  0509    137   K 4.9 4.7   CL 91* 91*   CO2 17* 26   * 61*   BUN 49* 29*   CREATININE 5.6* 3.9*   CALCIUM 7.1* 7.4*   PROT 6.1 5.8*   ALBUMIN 2.8* 2.7*   BILITOT 2.4* 1.8*   ALKPHOS 347* 320*   * 446*   * 290*   ANIONGAP 32* 20*   EGFRNONAA 7.0* 10.8*     Cardiac Markers:   Recent Labs   Lab 10/18/19  1125   BNP  >4,500*     Lactic Acid:   Recent Labs   Lab 10/18/19  1806 10/19/19  0000 10/19/19  0509   LACTATE 2.5* 2.7* 2.0*     Magnesium:   Recent Labs   Lab 10/19/19  0509   MG 1.9     POCT Glucose: No results for input(s): POCTGLUCOSE in the last 48 hours.  Troponin:   Recent Labs   Lab 10/18/19  1552 10/18/19  1806 10/19/19  0027   TROPONINI 0.112* 0.121* 0.116*     TSH:   Recent Labs   Lab 09/29/19  1049   TSH 3.155     All pertinent labs within the past 24 hours have been reviewed.    Significant Imaging: I have reviewed and interpreted all pertinent imaging results/findings within the past 24 hours.   X-ray Chest Ap Portable    Result Date: 10/18/2019  EXAMINATION: XR CHEST AP PORTABLE CLINICAL HISTORY: Chest pain and dyspnea. FINDINGS: Portable chest radiograph at 11:17 hours compared to multiple prior exams including 10/01/2019 shows the cardiac silhouette is moderately enlarged, stable in size, with stable enlarged pulmonary arterial trunk.  The central pulmonary vasculature is prominent, with aortic vascular calcifications.  There are changes of prior mitral valvuloplasty. The lungs are normally expanded, with scattered reticulonodular and ground-glass opacities throughout both lungs, with nonspecific nodular densities in the right lower lung, potentially loculated pleural fluid.  There is fluid in the right minor fissure, with small pleural effusions blunting the costophrenic angles.  There is no pneumothorax.  No acute osseous abnormality.     1. Stable moderate cardiomegaly and enlarged central pulmonary vasculature, with enlarged PA trunk. 2. Mild scattered interstitial opacities in both lungs, with small bilateral pleural effusions. 3. Nonspecific right lower lung nodular opacities, potentially loculated pleural fluid.  Follow-up PA and lateral chest radiograph is recommended when clinically feasible. No evidence of active cardiopulmonary disease. Electronically signed by: Brian Eugene MD Date:    10/18/2019  Time:    11:42    X-ray Chest Ap Portable    Result Date: 10/1/2019  EXAMINATION: XR CHEST AP PORTABLE CLINICAL HISTORY: Shortness of breath, congestion COMPARISON: September 29, 2019 FINDINGS: Cardiomegaly is stable.  Postoperative changes of previous mitral valve replacement are noted.  The aortic arch is calcified. Pulmonary vascular congestion and diffuse abnormal interstitial prominence are not significantly changed when allowing for differences in technique.  Small right-sided pleural effusion is stable.  No pneumothorax is identified.  Osseous structures are unremarkable.     1. Stable radiographic appearance of the chest when compared to September 29. Electronically signed by: Aidan Mullen MD Date:    10/01/2019 Time:    08:32    X-ray Chest Ap Portable    Result Date: 9/29/2019  EXAMINATION: XR CHEST AP PORTABLE CLINICAL HISTORY: CHF; TECHNIQUE: Single frontal view of the chest was performed. COMPARISON: 09/10/2019 FINDINGS: Single AP view of the chest was performed with previous comparison study 09/10/2019.  Stable cardiomediastinal enlargement with biventricular dominance and postop changes of previous aortic valve repair. Increased central vascular prominence noted with cephalization of flow to the upper lobes bilaterally secondary mild vascular congestion changes with no reduction in the interval.  Stable bilateral right and left pleural effusion is more prominent on the right side.  There is no evidence of pneumothorax formation.  Atheromatous changes about the aortic knob.  Tracheal lumen appears midline.  There is no evidence of significant skeletal abnormality.     1. Stable cardiomegaly. 2. Postop changes of previous aortic valve repair. 3. Stable interstitial pulmonary edema with no reduction in the interval. Electronically signed by: Alonso Garcia MD Date:    09/29/2019 Time:    10:45

## 2019-10-19 NOTE — HOSPITAL COURSE
"10/18:  Post dialysis had some bleeding from AV fistula requiring suture  10/19:  Off BiPAP on nasal cannula, home requirement and saturating well, of Cardizem drip with rate controlled atrial fibrillation, subjectively feels better  10/20/19 0745 hours: Chart reviewed. States "I don't feel good" Has non-productive cough. Denies chest pain/SOB. No orthopnea/PND. Admission CXR showed RLL opacities.  10/21/19 0930 hours: Feeling "Sick" today. Needs dialysis. Feels "very" nauseous but no vomiting. No abdo pain. No change in BM. No CP since admission. Is breathing easier today. Much more alert today. For HD this afternoon. Hopefully discharge in AM.  "

## 2019-10-19 NOTE — ASSESSMENT & PLAN NOTE
Overnight with bleeding from AV fistula status post dialysis.  Requiring sutures.  Hemostasis achieved.  H&H holding stable.

## 2019-10-19 NOTE — ASSESSMENT & PLAN NOTE
Patient with chronic respiratory failure on 2-3 L home oxygen.  Presented with hypoxia with O2 sats down to 60%, started on BiPAP.  Likely secondary to pulmonary edema/volume overload, now status post dialysis with 3.5 L removed.  Off BiPAP and now on home O2 requirements.  Low suspicion pneumonia given lack of clinical symptoms, no fevers, no leukocytosis, radiological findings.  Check procalcitonin.  Discontinue Zosyn and monitor clinically.

## 2019-10-19 NOTE — PROGRESS NOTES
10/18/19 2045   Vital Signs   Temp 98 °F (36.7 °C)   Pulse (!) 164   Resp 18   SpO2 (!) 79 %   /78   MAP (mmHg) 101   Post-Hemodialysis Assessment   Rinseback Volume (mL) 250 mL   Blood Volume Processed (Liters) 53.9 L   Dialyzer Clearance Lightly streaked   Duration of Treatment (minutes) 180 minutes   Hemodialysis Intake (mL) 500 mL   Total UF (mL) 3500 mL   Net Fluid Removal 3000   Patient Response to Treatment Tolerated well   Post-Treatment Weight 61 kg (134 lb 7.7 oz)   Treatment Weight Change -3   Arterial bleeding stop time (min) 15 min   Venous bleeding stop time (min) 120 min   Post-Hemodialysis Comments Pt venous access continued to bleed after pressure was applied for 30 minutes Surgicil was applied but access continued to bleed for another 45 minutes. Dr Gonsales from  applied stitch to affected area and the bleeding stopped. Area was covered with gauze dressing

## 2019-10-19 NOTE — ASSESSMENT & PLAN NOTE
On admission inpatient with ESRD and heart disease.  Status post dialysis 10/18 with 3.5 L removed.  Clinically improved.

## 2019-10-19 NOTE — CONSULTS
"  [facility]  Adult Nutrition  Consult Note    SUMMARY     Recommendations     1. provided pt education on hypoglycemia; how to treat and recognize.   2. Encourage consistant po intake.    Goals: 1. pt to consume >75 % of assessed nutr needs via po intake.  Nutrition Goal Status: new    Reason for Assessment    Reason For Assessment: consult  Interdisciplinary Rounds: did not attend    Nutrition Risk Screen    Nutrition Risk Screen: other (see comments)(Por appetite)    Nutrition/Diet History    Spiritual, Cultural Beliefs, Restorationism Practices, Values that Affect Care: no  Food Allergies: NKFA    Anthropometrics    Temp: 97.7 °F (36.5 °C)  Height Method: Stated  Height: 5' 5" (165.1 cm)  Height (inches): 65 in  Weight Method: Bed Scale  Weight: 57.6 kg (126 lb 15.8 oz)  Weight (lb): 126.99 lb  Ideal Body Weight (IBW), Female: 125 lb  % Ideal Body Weight, Female (lb): 112.88 lb  BMI (Calculated): 23.5       Lab/Procedures/Meds    Pertinent Labs Reviewed: reviewed  Clinical Chemistry:  Recent Labs   Lab 10/19/19  0509      K 4.7   CL 91*   CO2 26   GLU 61*   BUN 29*   CREATININE 3.9*   CALCIUM 7.4*   PROT 5.8*   ALBUMIN 2.7*   BILITOT 1.8*   ALKPHOS 320*   *   *   ANIONGAP 20*   ESTGFRAFRICA 12.5*   EGFRNONAA 10.8*   MG 1.9   PHOS 6.2*     CBC:   Recent Labs   Lab 10/19/19  0509   WBC 6.58   RBC 3.65*   HGB 10.8*   HCT 32.4*   *   MCV 89   MCH 29.6   MCHC 33.3     Lipid Panel:  Recent Labs   Lab 10/19/19  0509   CHOL 155   HDL 56   LDLCALC 82.6   TRIG 82   CHOLHDL 36.1     Cardiac Profile:  Recent Labs   Lab 10/18/19  1125  10/18/19  1552 10/18/19  1806 10/18/19  2001 10/19/19  0000 10/19/19  0027   BNP >4,500*  --   --   --   --   --   --    CPKMB  --   --  2.9  --  3.5 2.8  --    TROPONINI  --    < > 0.112* 0.121*  --   --  0.116*    < > = values in this interval not displayed.     Inflammatory Labs:  No results for input(s): CRP in the last 168 hours.  Diabetes:  Recent " Labs   Lab 10/19/19  0509   HGBA1C 5.0     Thyroid & Parathyroid:  No results for input(s): TSH, FREET4, S8UGTVW, G4UJXFV, THYROIDAB in the last 168 hours.    Pertinent Medications Reviewed: reviewed      Estimated/Assessed Needs    Weight Used For Calorie Calculations: 57.6 kg (126 lb 15.8 oz)  Energy Calorie Requirements (kcal): 1890-5971  Energy Need Method: Kcal/kg  Protein Requirements: 86  Weight Used For Protein Calculations: 57.6 kg (126 lb 15.8 oz)  Fluid Requirements (mL): 1000+ output  Estimated Fluid Requirement Method: RDA Method  RDA Method (mL): 1728         Nutrition Prescription Ordered    Current Diet Order: renal    Evaluation of Received Nutrient/Fluid Intake    Energy Calories Required: not meeting needs  Protein Required: meeting needs  Fluid Required: meeting needs  Tolerance: tolerating; pt not interested in Nepro drinks. Pt eating breakfast of pancakes, syrup and hunter from outside hospital during rounds. Pt reprots usually eating well at home and 3 meals per day.       Nutrition Risk    Level of Risk/Frequency of Follow-up: high         Monitor and Evaluation    Food and Nutrient Intake: food and beverage intake  Food and Nutrient Adminstration: diet order                 Nutrition Follow-Up     yes  Mary Limon, LESA 10/19/2019 1:05 PM

## 2019-10-19 NOTE — PROGRESS NOTES
[facility] Medicine  Progress Note    Patient Name: Griselda Neely  MRN: [id number]  Patient Class: IP- Inpatient   Admission Date: 10/18/2019  Length of Stay: 1 days  Attending Physician: Lissa Portillo MD  Primary Care Provider: Kalie Neely MD        Subjective:     Principal Problem:Acute on chronic respiratory failure with hypoxia        HPI:  74-year-old  female was known to the service, known history of AFib, congestive heart failures, end-stage renal disease on HD  and Friday, COPD and continued to smoke, severe peripheral vascular disease, calciphylaxis, history of blood transfusion, gout, hypertension, hyperlipidemia presented to the ER with complaint of chest pain and severe shortness of breath.  Upon arrival to the ER patient O2 sats were 60% heart rate was 130 and she had bilateral rales.  On Accu-Chek her blood glucose was 20 (past medical history shows diabetes but sister denies history of diabetes, reporting she does not take any pills or insulin).  Sister report she was finely last night, and this morning she woke up with complaint of not feeling well.  Sister also informed that patient was planning to skip her dialysis today to attend her cousin's  , but had to come to ER because of her condition and difficulty breathing. Patient was in respiratory distress and was started on BiPAP of IP 16, a P 6 and FiO2 40% with improvement in her O2 sat to %. Patient was in AFib with RVR upon arrival in the ER, she was given Cardizem loading dose and started on Cardizem drip. Patient had bilateral rales and clearly in heart failure, BNP was > 4500.   Lactic acid 6.3, patient also has metabolic acidosis, could possibly due to respiratory distress but at this time infection cannot be ruled out, not convincing about sepsis. Nephrology Dr. gibson was consulted through ER for urgent dialysis orders. Cardiology will be consulted  Dr. Powell  Patient will be admitted to ICU for close monitoring.    Overview/Hospital Course:  10/18:  Post dialysis had some bleeding from AV fistula requiring suture  10/19:  Off BiPAP on nasal cannula, home requirement and saturating well, of Cardizem drip with rate controlled atrial fibrillation, subjectively feels better    Interval History:  Patient reports prior to admission she was feeling 'sick', chest pain which was relieved by nitro, shortness of breath, weak/generalized fatigue.  Denied missing any dialysis sessions.  Reports she had 3 episodes of nonbloody emesis. On admission she was placed of the BiPAP, had dialysis yesterday with 3.5 L removed, now off BiPAP on home O2 requirements and saturating well.  Atrial fibrillation now control, off Cardizem drip since last night.  Overnight post dialysis had uncontrolled bleeding from AV fistula, required suturing, she states she always bleds easily from the fistula.  Denies any sick contacts. She states she was previously feeling congested however was not able to expectorate any phlegm. Reports she always feels cold but no new chills or fever.    Review of Systems   Constitutional: Positive for chills. Negative for fever.   HENT: Negative for congestion and trouble swallowing.    Eyes: Negative for visual disturbance.   Respiratory: Positive for shortness of breath. Negative for cough and stridor.    Cardiovascular: Positive for chest pain. Negative for palpitations and leg swelling.   Gastrointestinal: Positive for nausea and vomiting. Negative for abdominal pain, constipation and diarrhea.   Musculoskeletal: Negative for neck stiffness.   Skin: Positive for wound.   Neurological: Positive for weakness.   Hematological: Bruises/bleeds easily.   Psychiatric/Behavioral: Negative for confusion.     Objective:     Vital Signs (Most Recent):  Temp: 97.8 °F (36.6 °C) (10/19/19 0300)  Pulse: 91 (10/19/19 0734)  Resp: 16 (10/19/19 0734)  BP: 117/64 (10/19/19  0500)  SpO2: 98 % (10/19/19 0734) Vital Signs (24h Range):  Temp:  [97.2 °F (36.2 °C)-98 °F (36.7 °C)] 97.8 °F (36.6 °C)  Pulse:  [] 91  Resp:  [12-44] 16  SpO2:  [91 %-100 %] 98 %  BP: (113-182)/() 117/64     Weight: 57.6 kg (126 lb 15.8 oz)  Body mass index is 21.13 kg/m².    Intake/Output Summary (Last 24 hours) at 10/19/2019 0834  Last data filed at 10/19/2019 0035  Gross per 24 hour   Intake 624.67 ml   Output 3500 ml   Net -2875.33 ml      Physical Exam   Constitutional: She is oriented to person, place, and time. No distress.   Chronically ill-appearing female, lying in bed, no apparent distress   HENT:   Head: Normocephalic and atraumatic.   Eyes: Right eye exhibits no discharge. Left eye exhibits no discharge.   Neck: Normal range of motion. Neck supple.   Cardiovascular: Normal rate.   Murmur heard.  Irregular heart rhythm, no significant lower extremity edema   Pulmonary/Chest: No stridor. No respiratory distress. She has no wheezes.   On supplemental oxygen via nasal cannula, not using accessory muscles of respiration, decreased air entry at bases   Abdominal: Soft. Bowel sounds are normal. She exhibits no distension. There is no tenderness. There is no guarding.   Genitourinary:   Genitourinary Comments: No Mauricio catheter   Neurological: She is alert and oriented to person, place, and time.   Moving all 4 extremities, generalized weakness   Skin: She is not diaphoretic.   Calciphylaxis, clean dry intact dressing overlying right upper extremity AVF   Psychiatric: She has a normal mood and affect.   Nursing note and vitals reviewed.      Significant Labs:   Bilirubin:   Recent Labs   Lab 09/29/19  1049 10/07/19  1012 10/18/19  1214 10/19/19  0509   BILITOT 0.7 1.0 2.4* 1.8*     CBC:   Recent Labs   Lab 10/18/19  1125 10/18/19  1127 10/19/19  0509   WBC 10.95  --  6.58   HGB 13.2  --  10.8*   HCT 42.6 46 32.4*     --  123*     CMP:   Recent Labs   Lab 10/18/19  1214 10/19/19  0509   NA  140 137   K 4.9 4.7   CL 91* 91*   CO2 17* 26   * 61*   BUN 49* 29*   CREATININE 5.6* 3.9*   CALCIUM 7.1* 7.4*   PROT 6.1 5.8*   ALBUMIN 2.8* 2.7*   BILITOT 2.4* 1.8*   ALKPHOS 347* 320*   * 446*   * 290*   ANIONGAP 32* 20*   EGFRNONAA 7.0* 10.8*     Cardiac Markers:   Recent Labs   Lab 10/18/19  1125   BNP >4,500*     Lactic Acid:   Recent Labs   Lab 10/18/19  1806 10/19/19  0000 10/19/19  0509   LACTATE 2.5* 2.7* 2.0*     Magnesium:   Recent Labs   Lab 10/19/19  0509   MG 1.9     POCT Glucose: No results for input(s): POCTGLUCOSE in the last 48 hours.  Troponin:   Recent Labs   Lab 10/18/19  1552 10/18/19  1806 10/19/19  0027   TROPONINI 0.112* 0.121* 0.116*     TSH:   Recent Labs   Lab 09/29/19  1049   TSH 3.155     All pertinent labs within the past 24 hours have been reviewed.    Significant Imaging: I have reviewed and interpreted all pertinent imaging results/findings within the past 24 hours.   X-ray Chest Ap Portable    Result Date: 10/18/2019  EXAMINATION: XR CHEST AP PORTABLE CLINICAL HISTORY: Chest pain and dyspnea. FINDINGS: Portable chest radiograph at 11:17 hours compared to multiple prior exams including 10/01/2019 shows the cardiac silhouette is moderately enlarged, stable in size, with stable enlarged pulmonary arterial trunk.  The central pulmonary vasculature is prominent, with aortic vascular calcifications.  There are changes of prior mitral valvuloplasty. The lungs are normally expanded, with scattered reticulonodular and ground-glass opacities throughout both lungs, with nonspecific nodular densities in the right lower lung, potentially loculated pleural fluid.  There is fluid in the right minor fissure, with small pleural effusions blunting the costophrenic angles.  There is no pneumothorax.  No acute osseous abnormality.     1. Stable moderate cardiomegaly and enlarged central pulmonary vasculature, with enlarged PA trunk. 2. Mild scattered interstitial opacities in  both lungs, with small bilateral pleural effusions. 3. Nonspecific right lower lung nodular opacities, potentially loculated pleural fluid.  Follow-up PA and lateral chest radiograph is recommended when clinically feasible. No evidence of active cardiopulmonary disease. Electronically signed by: Brian Eugene MD Date:    10/18/2019 Time:    11:42    X-ray Chest Ap Portable    Result Date: 10/1/2019  EXAMINATION: XR CHEST AP PORTABLE CLINICAL HISTORY: Shortness of breath, congestion COMPARISON: September 29, 2019 FINDINGS: Cardiomegaly is stable.  Postoperative changes of previous mitral valve replacement are noted.  The aortic arch is calcified. Pulmonary vascular congestion and diffuse abnormal interstitial prominence are not significantly changed when allowing for differences in technique.  Small right-sided pleural effusion is stable.  No pneumothorax is identified.  Osseous structures are unremarkable.     1. Stable radiographic appearance of the chest when compared to September 29. Electronically signed by: Aidan Mullen MD Date:    10/01/2019 Time:    08:32    X-ray Chest Ap Portable    Result Date: 9/29/2019  EXAMINATION: XR CHEST AP PORTABLE CLINICAL HISTORY: CHF; TECHNIQUE: Single frontal view of the chest was performed. COMPARISON: 09/10/2019 FINDINGS: Single AP view of the chest was performed with previous comparison study 09/10/2019.  Stable cardiomediastinal enlargement with biventricular dominance and postop changes of previous aortic valve repair. Increased central vascular prominence noted with cephalization of flow to the upper lobes bilaterally secondary mild vascular congestion changes with no reduction in the interval.  Stable bilateral right and left pleural effusion is more prominent on the right side.  There is no evidence of pneumothorax formation.  Atheromatous changes about the aortic knob.  Tracheal lumen appears midline.  There is no evidence of significant skeletal abnormality.     1.  Stable cardiomegaly. 2. Postop changes of previous aortic valve repair. 3. Stable interstitial pulmonary edema with no reduction in the interval. Electronically signed by: Alonso Garcia MD Date:    09/29/2019 Time:    10:45        Assessment/Plan:      * Acute on chronic respiratory failure with hypoxia  Patient with chronic respiratory failure on 2-3 L home oxygen.  Presented with hypoxia with O2 sats down to 60%, started on BiPAP.  Likely secondary to pulmonary edema/volume overload, now status post dialysis with 3.5 L removed.  Off BiPAP and now on home O2 requirements.  Low suspicion pneumonia given lack of clinical symptoms, no fevers, no leukocytosis, radiological findings.  Check procalcitonin.  Discontinue Zosyn and monitor clinically.    A-fib with RVR  Known history of persistent atrial fibrillation, valvular, refused anticoagulation previously.    On admission AFib with RVR, likely driven by acute pulmonary processes, was started on Cardizem, now rate controlled.  Resume home metoprolol and Cardizem.  Discontinue echocardiogram as recently performed July 2019.  Appreciate Cardiology input.    Pulmonary edema  On admission inpatient with ESRD and heart disease.  Status post dialysis 10/18 with 3.5 L removed.  Clinically improved.      Transaminitis  New on chart review. Normal on 10/07 with AST/ALT 47/50.  On admission T bilirubin 2.4, AST/-400, also coagulopathy with INR 1.7.  Possibly in the setting of congestive hepatitis.  Endorsed emesis, dialysis patient.  Denies any offending medication.  Check acute viral hepatitis screen.  Right upper quadrant ultrasound.  Repeat CMP tomorrow.      Bleeding from AV fistula  Overnight with bleeding from AV fistula status post dialysis.  Requiring sutures.  Hemostasis achieved.  H&H holding stable.      Hypoglycemia  On admission glucose 20.  Not on any hypoglycemic medications.  Did have emesis.  Now improved.      Lactic acidosis  Elevated at 6.3 on  admission, now down trended to 2.0.  Do not suspect sepsis, likely in the setting of systemic hypoxemia as well as atrial fibrillation with RVR.      Congestive heart failure  Known history of congestive heart failure with reduced EF.      Chest pain  Episode of chest pain relieved by nitro prior to admission.  No further episodes.    Patient with a history of nonobstructive coronary artery disease on left heart catheterization March 2019      Chronic obstructive pulmonary disease  No evidence of acute exacerbation.      DNR (do not resuscitate)  Patient has a DNR status, confirmed by admission MD.      Calciphylaxis  Due to end-stage renal disease      ESRD (end stage renal disease) on HD M,W, F  Nephrology consulted and following for dialysis.      Tobacco dependence  Continue to smoke, history of COPD, with history of exacerbations  Patient not interested in quitting smoking      HTN (hypertension)  History of not well controlled in the past.  Continue home medications and monitor clinically.        VTE Risk Mitigation (From admission, onward)         Ordered     enoxaparin injection 30 mg  Daily      10/18/19 1551     IP VTE HIGH RISK PATIENT  Once      10/18/19 1551                      Lissa Portillo MD  Department of Hospital Medicine   [facility]

## 2019-10-20 PROBLEM — R58 BLEEDING: Status: RESOLVED | Noted: 2019-10-19 | Resolved: 2019-10-20

## 2019-10-20 LAB
ALBUMIN SERPL BCP-MCNC: 2.8 G/DL (ref 3.5–5.2)
ALP SERPL-CCNC: 276 U/L (ref 55–135)
ALT SERPL W/O P-5'-P-CCNC: 220 U/L (ref 10–44)
ANION GAP SERPL CALC-SCNC: 20 MMOL/L (ref 8–16)
AST SERPL-CCNC: 171 U/L (ref 10–40)
BASOPHILS # BLD AUTO: 0 K/UL (ref 0–0.2)
BASOPHILS NFR BLD: 0 % (ref 0–1.9)
BILIRUB SERPL-MCNC: 1.3 MG/DL (ref 0.1–1)
BUN SERPL-MCNC: 44 MG/DL (ref 8–23)
CALCIUM SERPL-MCNC: 7.6 MG/DL (ref 8.7–10.5)
CHLORIDE SERPL-SCNC: 92 MMOL/L (ref 95–110)
CO2 SERPL-SCNC: 24 MMOL/L (ref 23–29)
CREAT SERPL-MCNC: 5.3 MG/DL (ref 0.5–1.4)
DIFFERENTIAL METHOD: ABNORMAL
EOSINOPHIL # BLD AUTO: 0 K/UL (ref 0–0.5)
EOSINOPHIL NFR BLD: 0.3 % (ref 0–8)
ERYTHROCYTE [DISTWIDTH] IN BLOOD BY AUTOMATED COUNT: 19.3 % (ref 11.5–14.5)
EST. GFR  (AFRICAN AMERICAN): 8.6 ML/MIN/1.73 M^2
EST. GFR  (NON AFRICAN AMERICAN): 7.5 ML/MIN/1.73 M^2
GLUCOSE SERPL-MCNC: 109 MG/DL (ref 70–110)
GLUCOSE SERPL-MCNC: 114 MG/DL (ref 70–110)
GLUCOSE SERPL-MCNC: 149 MG/DL (ref 70–110)
GLUCOSE SERPL-MCNC: 86 MG/DL (ref 70–110)
GLUCOSE SERPL-MCNC: 99 MG/DL (ref 70–110)
HBV CORE AB SERPL QL IA: NEGATIVE
HBV SURFACE AB SER QL: NON REACTIVE
HBV SURFACE AG SERPL QL IA: NEGATIVE
HCT VFR BLD AUTO: 35.8 % (ref 37–48.5)
HGB BLD-MCNC: 11.7 G/DL (ref 12–16)
IMM GRANULOCYTES # BLD AUTO: 0.04 K/UL (ref 0–0.04)
IMM GRANULOCYTES NFR BLD AUTO: 0.6 % (ref 0–0.5)
LYMPHOCYTES # BLD AUTO: 0.8 K/UL (ref 1–4.8)
LYMPHOCYTES NFR BLD: 12.1 % (ref 18–48)
MAGNESIUM SERPL-MCNC: 2.2 MG/DL (ref 1.6–2.6)
MCH RBC QN AUTO: 29.8 PG (ref 27–31)
MCHC RBC AUTO-ENTMCNC: 32.7 G/DL (ref 32–36)
MCV RBC AUTO: 91 FL (ref 82–98)
MONOCYTES # BLD AUTO: 0.4 K/UL (ref 0.3–1)
MONOCYTES NFR BLD: 6.7 % (ref 4–15)
NEUTROPHILS # BLD AUTO: 5 K/UL (ref 1.8–7.7)
NEUTROPHILS NFR BLD: 80.3 % (ref 38–73)
NRBC BLD-RTO: 2 /100 WBC
PHOSPHATE SERPL-MCNC: 5.7 MG/DL (ref 2.7–4.5)
PLATELET # BLD AUTO: 138 K/UL (ref 150–350)
PMV BLD AUTO: 11.6 FL (ref 9.2–12.9)
POTASSIUM SERPL-SCNC: 5 MMOL/L (ref 3.5–5.1)
PROT SERPL-MCNC: 6.2 G/DL (ref 6–8.4)
RBC # BLD AUTO: 3.92 M/UL (ref 4–5.4)
SODIUM SERPL-SCNC: 136 MMOL/L (ref 136–145)
WBC # BLD AUTO: 6.28 K/UL (ref 3.9–12.7)

## 2019-10-20 PROCEDURE — 27000221 HC OXYGEN, UP TO 24 HOURS

## 2019-10-20 PROCEDURE — 25000003 PHARM REV CODE 250: Performed by: INTERNAL MEDICINE

## 2019-10-20 PROCEDURE — 36415 COLL VENOUS BLD VENIPUNCTURE: CPT

## 2019-10-20 PROCEDURE — 25000003 PHARM REV CODE 250

## 2019-10-20 PROCEDURE — 99900035 HC TECH TIME PER 15 MIN (STAT)

## 2019-10-20 PROCEDURE — 63600175 PHARM REV CODE 636 W HCPCS: Performed by: INTERNAL MEDICINE

## 2019-10-20 PROCEDURE — 85025 COMPLETE CBC W/AUTO DIFF WBC: CPT

## 2019-10-20 PROCEDURE — 94640 AIRWAY INHALATION TREATMENT: CPT

## 2019-10-20 PROCEDURE — 21400001 HC TELEMETRY ROOM

## 2019-10-20 PROCEDURE — 80053 COMPREHEN METABOLIC PANEL: CPT

## 2019-10-20 PROCEDURE — 83735 ASSAY OF MAGNESIUM: CPT

## 2019-10-20 PROCEDURE — 84100 ASSAY OF PHOSPHORUS: CPT

## 2019-10-20 PROCEDURE — 94761 N-INVAS EAR/PLS OXIMETRY MLT: CPT

## 2019-10-20 RX ORDER — HYDROCODONE BITARTRATE AND ACETAMINOPHEN 7.5; 325 MG/1; MG/1
1 TABLET ORAL EVERY 4 HOURS PRN
Status: DISCONTINUED | OUTPATIENT
Start: 2019-10-20 | End: 2019-10-22 | Stop reason: HOSPADM

## 2019-10-20 RX ORDER — GUAIFENESIN 600 MG/1
600 TABLET, EXTENDED RELEASE ORAL 2 TIMES DAILY
Status: DISCONTINUED | OUTPATIENT
Start: 2019-10-20 | End: 2019-10-22 | Stop reason: HOSPADM

## 2019-10-20 RX ADMIN — ASPIRIN 81 MG 81 MG: 81 TABLET ORAL at 09:10

## 2019-10-20 RX ADMIN — OXYCODONE HYDROCHLORIDE AND ACETAMINOPHEN 1 TABLET: 5; 325 TABLET ORAL at 04:10

## 2019-10-20 RX ADMIN — SEVELAMER CARBONATE 800 MG: 800 TABLET, FILM COATED ORAL at 09:10

## 2019-10-20 RX ADMIN — MAGNESIUM OXIDE 400 MG: 400 TABLET ORAL at 09:10

## 2019-10-20 RX ADMIN — GUAIFENESIN 600 MG: 600 TABLET, EXTENDED RELEASE ORAL at 09:10

## 2019-10-20 RX ADMIN — CALCIUM ACETATE 1334 MG: 667 CAPSULE ORAL at 03:10

## 2019-10-20 RX ADMIN — FAMOTIDINE 20 MG: 20 TABLET ORAL at 09:10

## 2019-10-20 RX ADMIN — ISOSORBIDE MONONITRATE 30 MG: 30 TABLET, EXTENDED RELEASE ORAL at 09:10

## 2019-10-20 RX ADMIN — SEVELAMER CARBONATE 800 MG: 800 TABLET, FILM COATED ORAL at 03:10

## 2019-10-20 RX ADMIN — FLUTICASONE FUROATE AND VILANTEROL TRIFENATATE 1 PUFF: 100; 25 POWDER RESPIRATORY (INHALATION) at 09:10

## 2019-10-20 RX ADMIN — CHLORHEXIDINE GLUCONATE 15 ML: 1.2 RINSE ORAL at 09:10

## 2019-10-20 RX ADMIN — CALCIUM ACETATE 1334 MG: 667 CAPSULE ORAL at 09:10

## 2019-10-20 RX ADMIN — ENOXAPARIN SODIUM 30 MG: 100 INJECTION SUBCUTANEOUS at 05:10

## 2019-10-20 RX ADMIN — LOSARTAN POTASSIUM 25 MG: 25 TABLET, FILM COATED ORAL at 09:10

## 2019-10-20 NOTE — PROGRESS NOTES
[facility]  Progress Note    Patient Name: Griselda Neely  MRN: [id number]  Patient Class: IP- Inpatient   Admission Date: 10/18/2019  Length of Stay: 2 days  Attending Physician: Chalino Victoria MD  Primary Care Provider: Kalie Neely MD        Subjective:     Principal Problem:Acute on chronic respiratory failure with hypoxia        HPI:  74-year-old  female was known to the service, known history of AFib, congestive heart failures, end-stage renal disease on HD  and Friday, COPD and continued to smoke, severe peripheral vascular disease, calciphylaxis, history of blood transfusion, gout, hypertension, hyperlipidemia presented to the ER with complaint of chest pain and severe shortness of breath.  Upon arrival to the ER patient O2 sats were 60% heart rate was 130 and she had bilateral rales.  On Accu-Chek her blood glucose was 20 (past medical history shows diabetes but sister denies history of diabetes, reporting she does not take any pills or insulin).  Sister report she was finely last night, and this morning she woke up with complaint of not feeling well.  Sister also informed that patient was planning to skip her dialysis today to attend her cousin's  , but had to come to ER because of her condition and difficulty breathing. Patient was in respiratory distress and was started on BiPAP of IP 16, a P 6 and FiO2 40% with improvement in her O2 sat to %. Patient was in AFib with RVR upon arrival in the ER, she was given Cardizem loading dose and started on Cardizem drip. Patient had bilateral rales and clearly in heart failure, BNP was > 4500.   Lactic acid 6.3, patient also has metabolic acidosis, could possibly due to respiratory distress but at this time infection cannot be ruled out, not convincing about sepsis. Nephrology Dr. gibson was consulted through ER for urgent dialysis orders. Cardiology will be consulted   "Andre  Patient will be admitted to ICU for close monitoring.    Overview/Hospital Course:  10/18:  Post dialysis had some bleeding from AV fistula requiring suture  10/19:  Off BiPAP on nasal cannula, home requirement and saturating well, of Cardizem drip with rate controlled atrial fibrillation, subjectively feels better  10/20/19 0745 hours: Chart reviewed. States "I don't feel good" Has non-productive cough. Denies chest pain/SOB. No orthopnea/PND. Admission CXR showed RLL opacities.    Interval History: non-productive cough    Review of Systems   Constitutional: Positive for fatigue.   HENT: Negative.    Eyes: Negative.    Respiratory: Positive for cough.    Cardiovascular: Negative.    Gastrointestinal: Negative.    Endocrine: Negative.    Genitourinary: Negative.    Musculoskeletal: Negative.    Skin: Negative.    Allergic/Immunologic: Negative.    Neurological: Negative.    Hematological: Negative.    All other systems reviewed and are negative.    Objective:     Vital Signs (Most Recent):  Temp: (No vitals per nurse) (10/20/19 0301)  Pulse: (!) 47 (10/20/19 0728)  Resp: 16 (10/20/19 0728)  BP: 117/63 (10/20/19 0728)  SpO2: 97 % (10/20/19 0728) Vital Signs (24h Range):  Temp:  [97.7 °F (36.5 °C)-97.8 °F (36.6 °C)] 97.8 °F (36.6 °C)  Pulse:  [40-99] 47  Resp:  [14-20] 16  SpO2:  [94 %-100 %] 97 %  BP: ()/(58-72) 117/63     Weight: 59.7 kg (131 lb 9.8 oz)  Body mass index is 21.9 kg/m².    Intake/Output Summary (Last 24 hours) at 10/20/2019 0807  Last data filed at 10/19/2019 1300  Gross per 24 hour   Intake 240 ml   Output --   Net 240 ml      Physical Exam   Constitutional: She is oriented to person, place, and time. She appears well-developed and well-nourished.   HENT:   Head: Normocephalic and atraumatic.   Eyes: Pupils are equal, round, and reactive to light. Conjunctivae and EOM are normal.   Neck: Normal range of motion. Neck supple.   Cardiovascular: Normal rate, regular rhythm, normal heart " sounds and intact distal pulses.   Pulmonary/Chest:   Decreased entry bases with coarse large upper airway sounds clearing with cough; no opening crepitations nor expiratory wheezes   Abdominal: Soft. Bowel sounds are normal.   Musculoskeletal: Normal range of motion.   Neurological: She is alert and oriented to person, place, and time.   Skin: Skin is warm and dry. Capillary refill takes less than 2 seconds.   Psychiatric: She has a normal mood and affect. Her behavior is normal. Judgment and thought content normal.   Nursing note and vitals reviewed.      Significant Labs:   BMP:   Recent Labs   Lab 10/20/19  0428         K 5.0   CL 92*   CO2 24   BUN 44*   CREATININE 5.3*   CALCIUM 7.6*   MG 2.2     CBC:   Recent Labs   Lab 10/18/19  1125 10/18/19  1127 10/19/19  0509 10/20/19  0428   WBC 10.95  --  6.58 6.28   HGB 13.2  --  10.8* 11.7*   HCT 42.6 46 32.4* 35.8*     --  123* 138*       Significant Imaging: I have reviewed all pertinent imaging results/findings within the past 24 hours.     US Abdomen Limited  Narrative: EXAMINATION:  US ABDOMEN LIMITED    CLINICAL HISTORY:  Transaminitis;    COMPARISON:  04/11/2019    FINDINGS:  The aorta and IVC are normal.    The liver is normal in size and echogenicity.  There is minimal fluid around the liver.  There is a paddle pedal flow within the portal vein.    There is a 7 mm hyperechoic nodule within the left lobe of the liver which may represent a small hemangioma and is stable.    The patient has had a cholecystectomy.  The common bile duct measures 5 mm.  The right kidney is atrophic.  Impression: Minimal ascites    Prior cholecystectomy    Stable 7 mm hyperechoic focus within the left lobe of the liver suggestive of small hemangioma.    Atrophic right kidney    Electronically signed by: Keily Hernandez MD  Date:    10/19/2019  Time:    09:18          Assessment/Plan:      * Acute on chronic respiratory failure with hypoxia  Patient with chronic  respiratory failure on 2-3 L home oxygen. Repeat CXR    Lactic acidosis  Elevated at 6.3 on admission, now down trended to 2.0.  Do not suspect sepsis, likely in the setting of systemic hypoxemia as well as atrial fibrillation with RVR.      Transaminitis  New on chart review. Normal on 10/07 with AST/ALT 47/50.  On admission T bilirubin 2.4, AST/-400, also coagulopathy with INR 1.7.  Possibly in the setting of congestive hepatitis.  Endorsed emesis, dialysis patient.  Denies any offending medication.  Check acute viral hepatitis screen.  Right upper quadrant ultrasound.  Repeat CMP tomorrow.      Pulmonary edema  On admission inpatient with ESRD and heart disease.  Status post dialysis 10/18 with 3.5 L removed.  Clinically improved.      Congestive heart failure  Known history of congestive heart failure with reduced EF.      Chest pain  Episode of chest pain relieved by nitro prior to admission.  No further episodes.    Patient with a history of nonobstructive coronary artery disease on left heart catheterization March 2019      Chronic obstructive pulmonary disease  No evidence of acute exacerbation.      DNR (do not resuscitate)  Patient has a DNR status, confirmed by admission MD.      Hypoglycemia  On admission glucose 20.  Not on any hypoglycemic medications.  Did have emesis.  Now improved.      A-fib with RVR  Known history of persistent atrial fibrillation, valvular, refused anticoagulation previously.    On admission AFib with RVR, likely driven by acute pulmonary processes, was started on Cardizem, now rate controlled.  Resume home metoprolol and Cardizem.  Appreciate Cardiology input.    Calciphylaxis  Due to end-stage renal disease      ESRD (end stage renal disease) on HD M,W, F  Nephrology consulted and following for dialysis.      Tobacco dependence  Continue to smoke, history of COPD, with history of exacerbations  Patient not interested in quitting smoking      HTN (hypertension)  History  of not well controlled in the past.  Continue home medications and monitor clinically.        VTE Risk Mitigation (From admission, onward)         Ordered     enoxaparin injection 30 mg  Daily      10/18/19 1551     IP VTE HIGH RISK PATIENT  Once      10/18/19 1551                      Chalino Victoria MD  Department of Hospital Medicine   [facility]

## 2019-10-20 NOTE — NURSING
"Dr. Portillo making rounds on unit made aware that pt had 2.3 sec pause and HR 49. MD states, "Ok, pt has afib." No new orders received at this time, safety maintained, will cont to moniter.   "

## 2019-10-20 NOTE — ASSESSMENT & PLAN NOTE
Known history of persistent atrial fibrillation, valvular, refused anticoagulation previously.    On admission AFib with RVR, likely driven by acute pulmonary processes, was started on Cardizem, now rate controlled.  Resume home metoprolol and Cardizem.  Appreciate Cardiology input.

## 2019-10-20 NOTE — NURSING
Informed Dr. Victoria that pt c/o not feeling right and pain but cant tell me where her pain is at or what does not feel right, VSS, HR remains in 40's. MD ordered norco 7.5 po Q4 hours prn pain, informed pt, pt refused pain med at this time. Assisted to chair per pt request with assist x2, chair alarm applied, call light in easy reach, NAD, instructed pt to call for assist, verbalizes understanding, will cont to moniter.

## 2019-10-20 NOTE — PLAN OF CARE
10/20/19 0943   Patient Assessment/Suction   Level of Consciousness (AVPU) alert   Respiratory Effort Unlabored   Expansion/Accessory Muscles/Retractions no use of accessory muscles   All Lung Fields Breath Sounds coarse   STEPHANIE Breath Sounds coarse   LLL Breath Sounds coarse   RUL Breath Sounds coarse   RML Breath Sounds coarse   RLL Breath Sounds coarse   Rhythm/Pattern, Respiratory pattern regular   Cough Frequency infrequent   Cough Type good   PRE-TX-O2   O2 Device (Oxygen Therapy) nasal cannula   $ Is the patient on Low Flow Oxygen? Yes   Flow (L/min) 2   SpO2 99 %   Pulse Oximetry Type Intermittent   $ Pulse Oximetry - Multiple Charge Pulse Oximetry - Multiple   Pulse (!) 49   Resp 16   Aerosol Therapy   $ Aerosol Therapy Charges PRN treatment not required   Inhaler   $ Inhaler Charges MDI (Metered Dose Inahler) Treatment   Daily Review of Necessity (Inhaler) completed   Respiratory Treatment Status (Inhaler) given   Treatment Route (Inhaler) mouthpiece   Patient Position (Inhaler) HOB elevated   Post Treatment Assessment (Inhaler) breath sounds improved   Signs of Intolerance (Inhaler) none   Breath Sounds Post-Respiratory Treatment   Throughout All Fields Post-Treatment All Fields   Throughout All Fields Post-Treatment clear;coarse   Post-treatment Heart Rate (beats/min) 88   Post-treatment Resp Rate (breaths/min) 16

## 2019-10-20 NOTE — CONSULTS
Consult Note  Nephrology    Griselda Neely  female  74 y.o.  Consult Requested By: Lissa Portillo MD  Reason for Consult:ESRD/HD Needs       SUBJECTIVE:     History of Present Illness:  Patient is a 74 y.o. female presents with AFib with RVR and CHF  Pt had HD late Friday evening with improvement in his symptoms.     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    both legs    CHF (congestive heart failure)     Encounter for blood transfusion 03/2016    Gout     Hypertension     Mitral valve regurgitation     Osteoarthritis     Pancreatitis     Peripheral vascular disease     Peritoneal dialysis catheter in place     Pneumonia 09/09/2017    Renal failure      Past Surgical History:   Procedure Laterality Date    ACHILLES TENDON SURGERY Right     APPENDECTOMY      CARDIAC CATHETERIZATION  07/03/2017    CHOLECYSTECTOMY      heal surgery Right     HYSTERECTOMY      PARATHYROIDECTOMY      PARATHYROIDECTOMY  07/13/2017    PERITONEAL CATHETER INSERTION      RENAL BIOPSY      vocal cord nodule       Family History   Problem Relation Age of Onset    Heart disease Sister     Early death Sister         heart as baby    Heart disease Maternal Grandfather     Diabetes Mother     Hypertension Mother     Heart failure Mother     Heart disease Mother     Arthritis Mother     Diabetes Father     Early death Sister         infant     Social History     Tobacco Use    Smoking status: Current Some Day Smoker     Packs/day: 0.50     Years: 45.00     Pack years: 22.50    Smokeless tobacco: Never Used   Substance Use Topics    Alcohol use: No    Drug use: No      Review of patient's allergies indicates:  No Known Allergies   Prior to Admission medications    Medication Sig Start Date End Date Taking? Authorizing Provider   aspirin 81 MG Chew Take 81 mg by mouth once daily.    Yes Historical Provider, MD   diltiaZEM (CARDIZEM CD) 180 MG 24 hr capsule Take 180 mg by  mouth once daily.   Yes Historical Provider, MD   isosorbide mononitrate (IMDUR) 30 MG 24 hr tablet Take 30 mg by mouth once daily.   Yes Historical Provider, MD   losartan (COZAAR) 25 MG tablet Take 25 mg by mouth once daily.   Yes Historical Provider, MD   magnesium oxide (MAG-OX) 400 mg (241.3 mg magnesium) tablet Take 400 mg by mouth once daily.   Yes Historical Provider, MD   metoprolol succinate (TOPROL-XL) 50 MG 24 hr tablet Take 1 tablet (50 mg total) by mouth once daily.  Patient taking differently: Take 50 mg by mouth once daily.  8/10/19 8/9/20 Yes Daren Lea MD   predniSONE (DELTASONE) 10 MG tablet Take 30 mg (3 tabs) x5 days, take 20 mg (2 tabs) x5 days, take 10 mg (1 tab) x5 days 10/1/19  Yes Jhoan Fuentes MD   calcium acetate (PHOSLO) 667 mg capsule Take 1,334 mg by mouth 3 (three) times daily with meals.    Historical Provider, MD   diltiaZEM (CARDIZEM) 60 MG tablet Take 60 mg by mouth 3 (three) times daily.    Historical Provider, MD   fluticasone furoate-vilanterol (BREO) 100-25 mcg/dose diskus inhaler Inhale 1 puff into the lungs once daily. Controller    Historical Provider, MD   levETIRAcetam (KEPPRA) 500 MG Tab Take 500 mg by mouth every Mon, Wed, Fri. After diaylsis    Historical Provider, MD   ondansetron (ZOFRAN-ODT) 4 MG TbDL Take 1 tablet (4 mg total) by mouth every 6 (six) hours as needed. 10/3/19   Kalie Neely MD   oxyCODONE-acetaminophen (PERCOCET) 5-325 mg per tablet Take 1 tablet by mouth daily as needed for Pain (with dialysis).     Historical Provider, MD   sevelamer HCl (RENAGEL) 800 MG Tab Take 1 tablet (800 mg total) by mouth 3 (three) times daily with meals. 10/1/19 10/31/19  Jhoan Fuentes MD   traMADol (ULTRAM) 50 mg tablet Take 1 tablet (50 mg total) by mouth 3 (three) times a week. 3 TIMES A WEEK AS NEEDED DURING HEMODIALYSIS 10/4/19   Kalie Neely MD        ROS  GEN         NO fever, chills, night sweats, decreased intake  EYES       NO  blurry vision, glasses, floaters, hearing defects, sore throat  E/N/T        NO sore throat, No epistaxis ,  hearing loss   CV            NO CP, palpitations, edema, arrhythmias  PULM      + SOB NO cough  GI             NO nausea, vomiting, diarrhea, BRBPR, melena, abdominal Pain              NO dysuria,frequency, urgency, hematuria  NEURO    NO LOC, seizure activity, dizziness  SKIN         NO rash, pruritis, petechiae  MS            No arthralgia, muscle aches, arthritis  ENDO       NO heat or cold intolerances   PSYCH     NO mood changes , SI, HI  HEME       NO anemia, eosinophilia, lymphedema, petechiae  ALLERGY NO urticaria, rhinitis         aspirin  81 mg Oral Daily    calcium acetate  1,334 mg Oral TID WM    chlorhexidine  15 mL Mouth/Throat BID    diltiaZEM  180 mg Oral Daily    enoxaparin  30 mg Subcutaneous Daily    fluticasone furoate-vilanterol  1 puff Inhalation Daily    isosorbide mononitrate  30 mg Oral Daily    levETIRAcetam  500 mg Oral Every Mon, Wed, Fri    losartan  25 mg Oral Daily    magnesium oxide  400 mg Oral Daily    metoprolol succinate  50 mg Oral Daily    mupirocin   Nasal BID    polyethylene glycol  17 g Oral Daily    sevelamer carbonate  800 mg Oral TID WM    traMADol  50 mg Oral Once per day on Mon Wed Fri     dextrose 50%, dextrose 50%, glucagon (human recombinant), glucose, glucose, ipratropium, levalbuterol, ondansetron, oxyCODONE-acetaminophen, sodium chloride 0.9%       OBJECTIVE:     Vital Signs (Most Recent)  Temp: 97.8 °F (36.6 °C) (10/19/19 1511)  Pulse: 62 (10/19/19 1511)  Resp: 16 (10/19/19 1511)  BP: 110/62 (10/19/19 1511)  SpO2: 100 % (10/19/19 1511)    Vital Signs Range (Last 24H):  Temp:  [97.7 °F (36.5 °C)-98 °F (36.7 °C)]   Pulse:  []   Resp:  [12-44]   BP: ()/()   SpO2:  [91 %-100 %]     Physical Exam:  General                NAD,  Pleasant, A&O x 3   HEENT                 LAD, O/P Clear,   NECK                   Supple,  No  swelling, No masses  CV                       S1 S2 No rub   PULM                   CTA / Diminished at bases   ABD                      Soft/ +BS/ NT/ND  EXTR                    No Clubbing/ No Cyanosis / No Edema  NEURO                Grossly Intact  SKIN                     No rashes/ No lesions   MS                        Normal range of motion, No joint effusions   PSYCH                 Normal Mood    Laboratory:  Recent Labs   Lab 10/18/19  1214 10/19/19  0509    137   K 4.9 4.7   CL 91* 91*   CO2 17* 26   BUN 49* 29*   CREATININE 5.6* 3.9*   * 61*   CALCIUM 7.1* 7.4*   PHOS  --  6.2*         Recent Labs   Lab 10/18/19  1125 10/18/19  1127 10/19/19  0509   WBC 10.95  --  6.58   HGB 13.2  --  10.8*   HCT 42.6 46 32.4*     --  123*           ASSESSMENT/PLAN:     1- ESRD- Maintain HD MWF   2- CHF - Clinically stable, per Cardiology   3- Anemia - Stable, DONNY per Dr. Ingram  4- Metabolic Acidosis - resolved   5- AFib with RVR - Defer to Cardiology     Dr Ingram to return Monday   Please call over weekend with any acute HD issues       Dr. Theresa Neely   Nephrology   [phone]

## 2019-10-20 NOTE — SUBJECTIVE & OBJECTIVE
Interval History: non-productive cough    Review of Systems   Constitutional: Positive for fatigue.   HENT: Negative.    Eyes: Negative.    Respiratory: Positive for cough.    Cardiovascular: Negative.    Gastrointestinal: Negative.    Endocrine: Negative.    Genitourinary: Negative.    Musculoskeletal: Negative.    Skin: Negative.    Allergic/Immunologic: Negative.    Neurological: Negative.    Hematological: Negative.    All other systems reviewed and are negative.    Objective:     Vital Signs (Most Recent):  Temp: (No vitals per nurse) (10/20/19 0301)  Pulse: (!) 47 (10/20/19 0728)  Resp: 16 (10/20/19 0728)  BP: 117/63 (10/20/19 0728)  SpO2: 97 % (10/20/19 0728) Vital Signs (24h Range):  Temp:  [97.7 °F (36.5 °C)-97.8 °F (36.6 °C)] 97.8 °F (36.6 °C)  Pulse:  [40-99] 47  Resp:  [14-20] 16  SpO2:  [94 %-100 %] 97 %  BP: ()/(58-72) 117/63     Weight: 59.7 kg (131 lb 9.8 oz)  Body mass index is 21.9 kg/m².    Intake/Output Summary (Last 24 hours) at 10/20/2019 0807  Last data filed at 10/19/2019 1300  Gross per 24 hour   Intake 240 ml   Output --   Net 240 ml      Physical Exam   Constitutional: She is oriented to person, place, and time. She appears well-developed and well-nourished.   HENT:   Head: Normocephalic and atraumatic.   Eyes: Pupils are equal, round, and reactive to light. Conjunctivae and EOM are normal.   Neck: Normal range of motion. Neck supple.   Cardiovascular: Normal rate, regular rhythm, normal heart sounds and intact distal pulses.   Pulmonary/Chest:   Decreased entry bases with coarse large upper airway sounds clearing with cough; no opening crepitations nor expiratory wheezes   Abdominal: Soft. Bowel sounds are normal.   Musculoskeletal: Normal range of motion.   Neurological: She is alert and oriented to person, place, and time.   Skin: Skin is warm and dry. Capillary refill takes less than 2 seconds.   Psychiatric: She has a normal mood and affect. Her behavior is normal. Judgment and  thought content normal.   Nursing note and vitals reviewed.      Significant Labs:   BMP:   Recent Labs   Lab 10/20/19  0428         K 5.0   CL 92*   CO2 24   BUN 44*   CREATININE 5.3*   CALCIUM 7.6*   MG 2.2     CBC:   Recent Labs   Lab 10/18/19  1125 10/18/19  1127 10/19/19  0509 10/20/19  0428   WBC 10.95  --  6.58 6.28   HGB 13.2  --  10.8* 11.7*   HCT 42.6 46 32.4* 35.8*     --  123* 138*       Significant Imaging: I have reviewed all pertinent imaging results/findings within the past 24 hours.     US Abdomen Limited  Narrative: EXAMINATION:  US ABDOMEN LIMITED    CLINICAL HISTORY:  Transaminitis;    COMPARISON:  04/11/2019    FINDINGS:  The aorta and IVC are normal.    The liver is normal in size and echogenicity.  There is minimal fluid around the liver.  There is a paddle pedal flow within the portal vein.    There is a 7 mm hyperechoic nodule within the left lobe of the liver which may represent a small hemangioma and is stable.    The patient has had a cholecystectomy.  The common bile duct measures 5 mm.  The right kidney is atrophic.  Impression: Minimal ascites    Prior cholecystectomy    Stable 7 mm hyperechoic focus within the left lobe of the liver suggestive of small hemangioma.    Atrophic right kidney    Electronically signed by: Keily Hernandez MD  Date:    10/19/2019  Time:    09:18

## 2019-10-20 NOTE — NURSING
Dr. Victoria making rounds on unit, informed that pt states she just doesn't feel good, VSS, HR in 40's, NAD. MD states that he will see pt. Safety maintained, will cont to moniter.

## 2019-10-20 NOTE — PLAN OF CARE
10/1945   Patient Assessment/Suction   Respiratory Effort Unlabored   All Lung Fields Breath Sounds crackles   Rhythm/Pattern, Respiratory pattern regular   PRE-TX-O2   O2 Device (Oxygen Therapy) nasal cannula   Flow (L/min) 2   SpO2 98 %   Pulse 74   Resp 16   Aerosol Therapy   $ Aerosol Therapy Charges PRN treatment not required

## 2019-10-20 NOTE — NURSING
Informed Dr. Victoria that pt remains drowsy. VSS, , NAD. MD states to cont to observe pt at this time. Repeated back, verified. Will report to oncoming nurse.

## 2019-10-21 LAB
ALBUMIN SERPL BCP-MCNC: 2.8 G/DL (ref 3.5–5.2)
ALP SERPL-CCNC: 244 U/L (ref 55–135)
ALT SERPL W/O P-5'-P-CCNC: 161 U/L (ref 10–44)
ANION GAP SERPL CALC-SCNC: 21 MMOL/L (ref 8–16)
ANION GAP SERPL CALC-SCNC: 21 MMOL/L (ref 8–16)
AST SERPL-CCNC: 88 U/L (ref 10–40)
BASOPHILS # BLD AUTO: 0.01 K/UL (ref 0–0.2)
BASOPHILS NFR BLD: 0.2 % (ref 0–1.9)
BILIRUB SERPL-MCNC: 1.3 MG/DL (ref 0.1–1)
BUN SERPL-MCNC: 55 MG/DL (ref 8–23)
BUN SERPL-MCNC: 55 MG/DL (ref 8–23)
CALCIUM SERPL-MCNC: 7.6 MG/DL (ref 8.7–10.5)
CALCIUM SERPL-MCNC: 7.6 MG/DL (ref 8.7–10.5)
CHLORIDE SERPL-SCNC: 91 MMOL/L (ref 95–110)
CHLORIDE SERPL-SCNC: 91 MMOL/L (ref 95–110)
CO2 SERPL-SCNC: 24 MMOL/L (ref 23–29)
CO2 SERPL-SCNC: 24 MMOL/L (ref 23–29)
CREAT SERPL-MCNC: 6 MG/DL (ref 0.5–1.4)
CREAT SERPL-MCNC: 6 MG/DL (ref 0.5–1.4)
DIFFERENTIAL METHOD: ABNORMAL
EOSINOPHIL # BLD AUTO: 0 K/UL (ref 0–0.5)
EOSINOPHIL NFR BLD: 0.5 % (ref 0–8)
ERYTHROCYTE [DISTWIDTH] IN BLOOD BY AUTOMATED COUNT: 19.4 % (ref 11.5–14.5)
ERYTHROCYTE [DISTWIDTH] IN BLOOD BY AUTOMATED COUNT: 19.4 % (ref 11.5–14.5)
EST. GFR  (AFRICAN AMERICAN): 7.4 ML/MIN/1.73 M^2
EST. GFR  (AFRICAN AMERICAN): 7.4 ML/MIN/1.73 M^2
EST. GFR  (NON AFRICAN AMERICAN): 6.4 ML/MIN/1.73 M^2
EST. GFR  (NON AFRICAN AMERICAN): 6.4 ML/MIN/1.73 M^2
GLUCOSE SERPL-MCNC: 109 MG/DL (ref 70–110)
GLUCOSE SERPL-MCNC: 70 MG/DL (ref 70–110)
GLUCOSE SERPL-MCNC: 77 MG/DL (ref 70–110)
GLUCOSE SERPL-MCNC: 77 MG/DL (ref 70–110)
GLUCOSE SERPL-MCNC: 88 MG/DL (ref 70–110)
HCT VFR BLD AUTO: 35.9 % (ref 37–48.5)
HCT VFR BLD AUTO: 35.9 % (ref 37–48.5)
HGB BLD-MCNC: 11.9 G/DL (ref 12–16)
HGB BLD-MCNC: 11.9 G/DL (ref 12–16)
IMM GRANULOCYTES # BLD AUTO: 0.02 K/UL (ref 0–0.04)
IMM GRANULOCYTES NFR BLD AUTO: 0.3 % (ref 0–0.5)
LYMPHOCYTES # BLD AUTO: 0.8 K/UL (ref 1–4.8)
LYMPHOCYTES NFR BLD: 12.9 % (ref 18–48)
MAGNESIUM SERPL-MCNC: 2.2 MG/DL (ref 1.6–2.6)
MCH RBC QN AUTO: 29.9 PG (ref 27–31)
MCH RBC QN AUTO: 29.9 PG (ref 27–31)
MCHC RBC AUTO-ENTMCNC: 33.1 G/DL (ref 32–36)
MCHC RBC AUTO-ENTMCNC: 33.1 G/DL (ref 32–36)
MCV RBC AUTO: 90 FL (ref 82–98)
MCV RBC AUTO: 90 FL (ref 82–98)
MONOCYTES # BLD AUTO: 0.5 K/UL (ref 0.3–1)
MONOCYTES NFR BLD: 8 % (ref 4–15)
NEUTROPHILS # BLD AUTO: 4.6 K/UL (ref 1.8–7.7)
NEUTROPHILS NFR BLD: 78.1 % (ref 38–73)
NRBC BLD-RTO: 1 /100 WBC
PHOSPHATE SERPL-MCNC: 6.2 MG/DL (ref 2.7–4.5)
PLATELET # BLD AUTO: 128 K/UL (ref 150–350)
PLATELET # BLD AUTO: 128 K/UL (ref 150–350)
PMV BLD AUTO: 11.2 FL (ref 9.2–12.9)
PMV BLD AUTO: 11.2 FL (ref 9.2–12.9)
POTASSIUM SERPL-SCNC: 5.1 MMOL/L (ref 3.5–5.1)
POTASSIUM SERPL-SCNC: 5.1 MMOL/L (ref 3.5–5.1)
PROT SERPL-MCNC: 6.1 G/DL (ref 6–8.4)
RBC # BLD AUTO: 3.98 M/UL (ref 4–5.4)
RBC # BLD AUTO: 3.98 M/UL (ref 4–5.4)
SODIUM SERPL-SCNC: 136 MMOL/L (ref 136–145)
SODIUM SERPL-SCNC: 136 MMOL/L (ref 136–145)
WBC # BLD AUTO: 5.89 K/UL (ref 3.9–12.7)
WBC # BLD AUTO: 5.89 K/UL (ref 3.9–12.7)

## 2019-10-21 PROCEDURE — 36415 COLL VENOUS BLD VENIPUNCTURE: CPT

## 2019-10-21 PROCEDURE — 94640 AIRWAY INHALATION TREATMENT: CPT

## 2019-10-21 PROCEDURE — 82962 GLUCOSE BLOOD TEST: CPT

## 2019-10-21 PROCEDURE — 25000003 PHARM REV CODE 250: Performed by: INTERNAL MEDICINE

## 2019-10-21 PROCEDURE — 85025 COMPLETE CBC W/AUTO DIFF WBC: CPT

## 2019-10-21 PROCEDURE — 94761 N-INVAS EAR/PLS OXIMETRY MLT: CPT

## 2019-10-21 PROCEDURE — 84100 ASSAY OF PHOSPHORUS: CPT

## 2019-10-21 PROCEDURE — 90935 HEMODIALYSIS ONE EVALUATION: CPT

## 2019-10-21 PROCEDURE — 21400001 HC TELEMETRY ROOM

## 2019-10-21 PROCEDURE — 99900035 HC TECH TIME PER 15 MIN (STAT)

## 2019-10-21 PROCEDURE — 25000003 PHARM REV CODE 250

## 2019-10-21 PROCEDURE — 83735 ASSAY OF MAGNESIUM: CPT

## 2019-10-21 PROCEDURE — 27000221 HC OXYGEN, UP TO 24 HOURS

## 2019-10-21 PROCEDURE — 80053 COMPREHEN METABOLIC PANEL: CPT

## 2019-10-21 RX ADMIN — SEVELAMER CARBONATE 800 MG: 800 TABLET, FILM COATED ORAL at 07:10

## 2019-10-21 RX ADMIN — CALCIUM ACETATE 1334 MG: 667 CAPSULE ORAL at 12:10

## 2019-10-21 RX ADMIN — MAGNESIUM OXIDE 400 MG: 400 TABLET ORAL at 09:10

## 2019-10-21 RX ADMIN — GUAIFENESIN 600 MG: 600 TABLET, EXTENDED RELEASE ORAL at 09:10

## 2019-10-21 RX ADMIN — ISOSORBIDE MONONITRATE 30 MG: 30 TABLET, EXTENDED RELEASE ORAL at 09:10

## 2019-10-21 RX ADMIN — ASPIRIN 81 MG 81 MG: 81 TABLET ORAL at 09:10

## 2019-10-21 RX ADMIN — LEVETIRACETAM 500 MG: 500 TABLET, FILM COATED ORAL at 09:10

## 2019-10-21 RX ADMIN — LOSARTAN POTASSIUM 25 MG: 25 TABLET, FILM COATED ORAL at 09:10

## 2019-10-21 RX ADMIN — FAMOTIDINE 20 MG: 20 TABLET ORAL at 09:10

## 2019-10-21 RX ADMIN — METOPROLOL SUCCINATE 50 MG: 50 TABLET, FILM COATED, EXTENDED RELEASE ORAL at 09:10

## 2019-10-21 RX ADMIN — TRAMADOL HYDROCHLORIDE 50 MG: 50 TABLET, COATED ORAL at 09:10

## 2019-10-21 RX ADMIN — DILTIAZEM HYDROCHLORIDE 180 MG: 180 CAPSULE, COATED, EXTENDED RELEASE ORAL at 09:10

## 2019-10-21 RX ADMIN — SEVELAMER CARBONATE 800 MG: 800 TABLET, FILM COATED ORAL at 12:10

## 2019-10-21 RX ADMIN — FLUTICASONE FUROATE AND VILANTEROL TRIFENATATE 1 PUFF: 100; 25 POWDER RESPIRATORY (INHALATION) at 07:10

## 2019-10-21 RX ADMIN — CALCIUM ACETATE 1334 MG: 667 CAPSULE ORAL at 07:10

## 2019-10-21 NOTE — PROGRESS NOTES
[facility] Medicine  Progress Note    Patient Name: Griselda Neely  MRN: [id number]  Patient Class: IP- Inpatient   Admission Date: 10/18/2019  Length of Stay: 3 days  Attending Physician: Chalino Victoria MD  Primary Care Provider: Kalie Neely MD        Subjective:     Principal Problem:Acute on chronic respiratory failure with hypoxia        HPI:  74-year-old  female was known to the service, known history of AFib, congestive heart failures, end-stage renal disease on HD  and Friday, COPD and continued to smoke, severe peripheral vascular disease, calciphylaxis, history of blood transfusion, gout, hypertension, hyperlipidemia presented to the ER with complaint of chest pain and severe shortness of breath.  Upon arrival to the ER patient O2 sats were 60% heart rate was 130 and she had bilateral rales.  On Accu-Chek her blood glucose was 20 (past medical history shows diabetes but sister denies history of diabetes, reporting she does not take any pills or insulin).  Sister report she was finely last night, and this morning she woke up with complaint of not feeling well.  Sister also informed that patient was planning to skip her dialysis today to attend her cousin's  , but had to come to ER because of her condition and difficulty breathing. Patient was in respiratory distress and was started on BiPAP of IP 16, a P 6 and FiO2 40% with improvement in her O2 sat to %. Patient was in AFib with RVR upon arrival in the ER, she was given Cardizem loading dose and started on Cardizem drip. Patient had bilateral rales and clearly in heart failure, BNP was > 4500.   Lactic acid 6.3, patient also has metabolic acidosis, could possibly due to respiratory distress but at this time infection cannot be ruled out, not convincing about sepsis. Nephrology Dr. gibson was consulted through ER for urgent dialysis orders. Cardiology will be consulted   "Andre  Patient will be admitted to ICU for close monitoring.    Overview/Hospital Course:  10/18:  Post dialysis had some bleeding from AV fistula requiring suture  10/19:  Off BiPAP on nasal cannula, home requirement and saturating well, of Cardizem drip with rate controlled atrial fibrillation, subjectively feels better  10/20/19 0745 hours: Chart reviewed. States "I don't feel good" Has non-productive cough. Denies chest pain/SOB. No orthopnea/PND. Admission CXR showed RLL opacities.  10/21/19 0930 hours: Feeling "Sick" today. Needs dialysis. Feels "very" nauseous but no vomiting. No abdo pain. No change in BM. No CP since admission. Is breathing easier today. Much more alert today. For HD this afternoon. Hopefully discharge in AM.    Interval History: improving    Review of Systems   Constitutional: Positive for fatigue.   HENT: Negative.    Eyes: Negative.    Respiratory: Negative.    Cardiovascular: Negative.    Gastrointestinal: Positive for nausea.   Endocrine: Negative.    Genitourinary: Negative.    Musculoskeletal: Negative.    Skin: Negative.    Allergic/Immunologic: Negative.    Neurological: Negative.    Hematological: Negative.    All other systems reviewed and are negative.    Objective:     Vital Signs (Most Recent):  Temp: 97 °F (36.1 °C) (10/21/19 1430)  Pulse: 72 (10/21/19 1515)  Resp: 16 (10/21/19 1430)  BP: 139/62 (10/21/19 1515)  SpO2: 100 % (10/21/19 1100) Vital Signs (24h Range):  Temp:  [97 °F (36.1 °C)-97.9 °F (36.6 °C)] 97 °F (36.1 °C)  Pulse:  [59-79] 72  Resp:  [16-18] 16  SpO2:  [94 %-100 %] 100 %  BP: (119-165)/(58-97) 139/62     Weight: 58.1 kg (128 lb 1.4 oz)  Body mass index is 21.31 kg/m².    Intake/Output Summary (Last 24 hours) at 10/21/2019 1545  Last data filed at 10/20/2019 1900  Gross per 24 hour   Intake --   Output 1 ml   Net -1 ml      Physical Exam   Constitutional: She is oriented to person, place, and time. She appears well-developed and well-nourished. "   Chronically ill apearing   HENT:   Head: Normocephalic and atraumatic.   Eyes: Pupils are equal, round, and reactive to light. Conjunctivae and EOM are normal.   Neck: Normal range of motion. Neck supple.   Cardiovascular: Normal rate, normal heart sounds and intact distal pulses.   Irregularly irregular   Pulmonary/Chest:   Decreased entry bases without adventitious sounds   Abdominal: Soft. Bowel sounds are normal.   Musculoskeletal: Normal range of motion.   Neurological: She is alert and oriented to person, place, and time.   Skin: Skin is warm and dry. Capillary refill takes less than 2 seconds.   Psychiatric: She has a normal mood and affect. Her behavior is normal. Judgment and thought content normal.   Nursing note and vitals reviewed.      Significant Labs:   BMP:   Recent Labs   Lab 10/21/19  0456   GLU 77  77     136   K 5.1  5.1   CL 91*  91*   CO2 24  24   BUN 55*  55*   CREATININE 6.0*  6.0*   CALCIUM 7.6*  7.6*   MG 2.2     CBC:   Recent Labs   Lab 10/20/19  0428 10/21/19  0456   WBC 6.28 5.89  5.89   HGB 11.7* 11.9*  11.9*   HCT 35.8* 35.9*  35.9*   * 128*  128*       Significant Imaging: I have reviewed all pertinent imaging results/findings within the past 24 hours.     X-Ray Chest AP Portable  Narrative: EXAMINATION:  XR CHEST AP PORTABLE    CLINICAL HISTORY:  persisting cough, hypoxemia;    COMPARISON:  10/18/2019    FINDINGS:  Moderate cardiopericardial silhouette enlargement is stable from prior.  Atherosclerotic calcification of the aorta.  Central pulmonary vascularity is stable.  Prosthetic cardiac valve.    Confluent opacity at the right lung base could reflect pleural fluid, airspace disease, or some combination thereof - this appears similar to prior.  Pleural fluid tracks into the right minor fissure.  There is also focal airspace opacity of the left mid lung which appears new from prior.  No pneumothorax.  No acute osseous abnormality.  Impression: Confluent  opacity at the right lung base potentially reflecting pleural fluid, airspace disease, or some combination thereof.    New focal left mid lung alveolar opacity, concerning for pneumonia.    Stable cardiomegaly.    Electronically signed by: Harish Liu MD  Date:    10/20/2019  Time:    08:55          Assessment/Plan:      * Acute on chronic respiratory failure with hypoxia  Patient with chronic respiratory failure on 2-3 L home oxygen. Repeat CXR  Improving    Lactic acidosis  Elevated at 6.3 on admission, now down trended to 2.0.  Do not suspect sepsis, likely in the setting of systemic hypoxemia as well as atrial fibrillation with RVR.      Transaminitis  New on chart review. Normal on 10/07 with AST/ALT 47/50.  On admission T bilirubin 2.4, AST/-400, also coagulopathy with INR 1.7.  Possibly in the setting of congestive hepatitis.  Endorsed emesis, dialysis patient.  Denies any offending medication.  Check acute viral hepatitis screen.  Right upper quadrant ultrasound.  Repeat CMP tomorrow.      Pulmonary edema  On admission inpatient with ESRD and heart disease.  Status post dialysis 10/18 with 3.5 L removed.  Clinically improved.      Congestive heart failure  Known history of congestive heart failure with reduced EF.      Chest pain  Episode of chest pain relieved by nitro prior to admission.  No further episodes.    Patient with a history of nonobstructive coronary artery disease on left heart catheterization March 2019      Chronic obstructive pulmonary disease  No evidence of acute exacerbation.      DNR (do not resuscitate)  Patient has a DNR status, confirmed by admission MD.      Hypoglycemia  On admission glucose 20.  Not on any hypoglycemic medications.  Did have emesis.  Now improved.      A-fib with RVR  Known history of persistent atrial fibrillation, valvular, refused anticoagulation previously.    On admission AFib with RVR, likely driven by acute pulmonary processes, was started on  Cardizem, now rate controlled.  Resume home metoprolol and Cardizem.  Appreciate Cardiology input.    Calciphylaxis  Due to end-stage renal disease      ESRD (end stage renal disease) on HD M,W, F  Nephrology consulted and following for dialysis.      Tobacco dependence  Continue to smoke, history of COPD, with history of exacerbations  Patient not interested in quitting smoking      HTN (hypertension)  History of not well controlled in the past.  Continue home medications and monitor clinically.        VTE Risk Mitigation (From admission, onward)         Ordered     enoxaparin injection 30 mg  Daily      10/18/19 1551     IP VTE HIGH RISK PATIENT  Once      10/18/19 1551                      Chalino Victoria MD  Department of Hospital Medicine   [facility]

## 2019-10-21 NOTE — SUBJECTIVE & OBJECTIVE
Interval History: improving    Review of Systems   Constitutional: Positive for fatigue.   HENT: Negative.    Eyes: Negative.    Respiratory: Negative.    Cardiovascular: Negative.    Gastrointestinal: Positive for nausea.   Endocrine: Negative.    Genitourinary: Negative.    Musculoskeletal: Negative.    Skin: Negative.    Allergic/Immunologic: Negative.    Neurological: Negative.    Hematological: Negative.    All other systems reviewed and are negative.    Objective:     Vital Signs (Most Recent):  Temp: 97 °F (36.1 °C) (10/21/19 1430)  Pulse: 72 (10/21/19 1515)  Resp: 16 (10/21/19 1430)  BP: 139/62 (10/21/19 1515)  SpO2: 100 % (10/21/19 1100) Vital Signs (24h Range):  Temp:  [97 °F (36.1 °C)-97.9 °F (36.6 °C)] 97 °F (36.1 °C)  Pulse:  [59-79] 72  Resp:  [16-18] 16  SpO2:  [94 %-100 %] 100 %  BP: (119-165)/(58-97) 139/62     Weight: 58.1 kg (128 lb 1.4 oz)  Body mass index is 21.31 kg/m².    Intake/Output Summary (Last 24 hours) at 10/21/2019 1545  Last data filed at 10/20/2019 1900  Gross per 24 hour   Intake --   Output 1 ml   Net -1 ml      Physical Exam   Constitutional: She is oriented to person, place, and time. She appears well-developed and well-nourished.   Chronically ill apearing   HENT:   Head: Normocephalic and atraumatic.   Eyes: Pupils are equal, round, and reactive to light. Conjunctivae and EOM are normal.   Neck: Normal range of motion. Neck supple.   Cardiovascular: Normal rate, normal heart sounds and intact distal pulses.   Irregularly irregular   Pulmonary/Chest:   Decreased entry bases without adventitious sounds   Abdominal: Soft. Bowel sounds are normal.   Musculoskeletal: Normal range of motion.   Neurological: She is alert and oriented to person, place, and time.   Skin: Skin is warm and dry. Capillary refill takes less than 2 seconds.   Psychiatric: She has a normal mood and affect. Her behavior is normal. Judgment and thought content normal.   Nursing note and vitals  reviewed.      Significant Labs:   BMP:   Recent Labs   Lab 10/21/19  0456   GLU 77  77     136   K 5.1  5.1   CL 91*  91*   CO2 24  24   BUN 55*  55*   CREATININE 6.0*  6.0*   CALCIUM 7.6*  7.6*   MG 2.2     CBC:   Recent Labs   Lab 10/20/19  0428 10/21/19  0456   WBC 6.28 5.89  5.89   HGB 11.7* 11.9*  11.9*   HCT 35.8* 35.9*  35.9*   * 128*  128*       Significant Imaging: I have reviewed all pertinent imaging results/findings within the past 24 hours.     X-Ray Chest AP Portable  Narrative: EXAMINATION:  XR CHEST AP PORTABLE    CLINICAL HISTORY:  persisting cough, hypoxemia;    COMPARISON:  10/18/2019    FINDINGS:  Moderate cardiopericardial silhouette enlargement is stable from prior.  Atherosclerotic calcification of the aorta.  Central pulmonary vascularity is stable.  Prosthetic cardiac valve.    Confluent opacity at the right lung base could reflect pleural fluid, airspace disease, or some combination thereof - this appears similar to prior.  Pleural fluid tracks into the right minor fissure.  There is also focal airspace opacity of the left mid lung which appears new from prior.  No pneumothorax.  No acute osseous abnormality.  Impression: Confluent opacity at the right lung base potentially reflecting pleural fluid, airspace disease, or some combination thereof.    New focal left mid lung alveolar opacity, concerning for pneumonia.    Stable cardiomegaly.    Electronically signed by: Harish Liu MD  Date:    10/20/2019  Time:    08:55

## 2019-10-22 ENCOUNTER — HOSPITAL ENCOUNTER (OUTPATIENT)
Facility: HOSPITAL | Age: 74
Discharge: HOME | End: 2019-10-25
Attending: EMERGENCY MEDICINE | Admitting: INTERNAL MEDICINE
Payer: MEDICARE

## 2019-10-22 VITALS
WEIGHT: 121.25 LBS | DIASTOLIC BLOOD PRESSURE: 71 MMHG | TEMPERATURE: 98 F | BODY MASS INDEX: 20.2 KG/M2 | HEIGHT: 65 IN | OXYGEN SATURATION: 94 % | SYSTOLIC BLOOD PRESSURE: 122 MMHG | HEART RATE: 68 BPM | RESPIRATION RATE: 14 BRPM

## 2019-10-22 DIAGNOSIS — R06.02 SOB (SHORTNESS OF BREATH): ICD-10-CM

## 2019-10-22 DIAGNOSIS — R07.9 CHEST PAIN, UNSPECIFIED TYPE: Primary | ICD-10-CM

## 2019-10-22 DIAGNOSIS — R06.02 SHORTNESS OF BREATH: ICD-10-CM

## 2019-10-22 DIAGNOSIS — N18.6 END STAGE RENAL DISEASE: ICD-10-CM

## 2019-10-22 DIAGNOSIS — R07.9 CHEST PAIN: ICD-10-CM

## 2019-10-22 LAB
ALBUMIN SERPL BCP-MCNC: 2.8 G/DL (ref 3.5–5.2)
ALBUMIN SERPL BCP-MCNC: 2.8 G/DL (ref 3.5–5.2)
ALBUMIN SERPL BCP-MCNC: 2.9 G/DL (ref 3.5–5.2)
ALP SERPL-CCNC: 219 U/L (ref 55–135)
ALP SERPL-CCNC: 234 U/L (ref 55–135)
ALP SERPL-CCNC: 234 U/L (ref 55–135)
ALT SERPL W/O P-5'-P-CCNC: 107 U/L (ref 10–44)
ALT SERPL W/O P-5'-P-CCNC: 122 U/L (ref 10–44)
ALT SERPL W/O P-5'-P-CCNC: 122 U/L (ref 10–44)
ANION GAP SERPL CALC-SCNC: 15 MMOL/L (ref 8–16)
ANION GAP SERPL CALC-SCNC: 17 MMOL/L (ref 8–16)
ANION GAP SERPL CALC-SCNC: 17 MMOL/L (ref 8–16)
AST SERPL-CCNC: 41 U/L (ref 10–40)
AST SERPL-CCNC: 51 U/L (ref 10–40)
AST SERPL-CCNC: 51 U/L (ref 10–40)
BASOPHILS # BLD AUTO: 0.01 K/UL (ref 0–0.2)
BASOPHILS # BLD AUTO: 0.01 K/UL (ref 0–0.2)
BASOPHILS NFR BLD: 0.2 % (ref 0–1.9)
BASOPHILS NFR BLD: 0.2 % (ref 0–1.9)
BILIRUB SERPL-MCNC: 1.3 MG/DL (ref 0.1–1)
BILIRUB SERPL-MCNC: 1.5 MG/DL (ref 0.1–1)
BILIRUB SERPL-MCNC: 1.5 MG/DL (ref 0.1–1)
BNP SERPL-MCNC: >4500 PG/ML (ref 0–99)
BUN SERPL-MCNC: 32 MG/DL (ref 8–23)
BUN SERPL-MCNC: 32 MG/DL (ref 8–23)
BUN SERPL-MCNC: 36 MG/DL (ref 8–23)
CALCIUM SERPL-MCNC: 7.6 MG/DL (ref 8.7–10.5)
CALCIUM SERPL-MCNC: 7.6 MG/DL (ref 8.7–10.5)
CALCIUM SERPL-MCNC: 7.8 MG/DL (ref 8.7–10.5)
CHLORIDE SERPL-SCNC: 95 MMOL/L (ref 95–110)
CHLORIDE SERPL-SCNC: 95 MMOL/L (ref 95–110)
CHLORIDE SERPL-SCNC: 98 MMOL/L (ref 95–110)
CO2 SERPL-SCNC: 23 MMOL/L (ref 23–29)
CO2 SERPL-SCNC: 23 MMOL/L (ref 23–29)
CO2 SERPL-SCNC: 24 MMOL/L (ref 23–29)
CREAT SERPL-MCNC: 4.8 MG/DL (ref 0.5–1.4)
CREAT SERPL-MCNC: 4.8 MG/DL (ref 0.5–1.4)
CREAT SERPL-MCNC: 5.6 MG/DL (ref 0.5–1.4)
DIFFERENTIAL METHOD: ABNORMAL
DIFFERENTIAL METHOD: ABNORMAL
EOSINOPHIL # BLD AUTO: 0 K/UL (ref 0–0.5)
EOSINOPHIL # BLD AUTO: 0 K/UL (ref 0–0.5)
EOSINOPHIL NFR BLD: 0.4 % (ref 0–8)
EOSINOPHIL NFR BLD: 0.9 % (ref 0–8)
ERYTHROCYTE [DISTWIDTH] IN BLOOD BY AUTOMATED COUNT: 19.7 % (ref 11.5–14.5)
ERYTHROCYTE [DISTWIDTH] IN BLOOD BY AUTOMATED COUNT: 19.9 % (ref 11.5–14.5)
ERYTHROCYTE [DISTWIDTH] IN BLOOD BY AUTOMATED COUNT: 19.9 % (ref 11.5–14.5)
EST. GFR  (AFRICAN AMERICAN): 8 ML/MIN/1.73 M^2
EST. GFR  (AFRICAN AMERICAN): 9.7 ML/MIN/1.73 M^2
EST. GFR  (AFRICAN AMERICAN): 9.7 ML/MIN/1.73 M^2
EST. GFR  (NON AFRICAN AMERICAN): 7 ML/MIN/1.73 M^2
EST. GFR  (NON AFRICAN AMERICAN): 8.4 ML/MIN/1.73 M^2
EST. GFR  (NON AFRICAN AMERICAN): 8.4 ML/MIN/1.73 M^2
GLUCOSE SERPL-MCNC: 72 MG/DL (ref 70–110)
GLUCOSE SERPL-MCNC: 75 MG/DL (ref 70–110)
GLUCOSE SERPL-MCNC: 75 MG/DL (ref 70–110)
GLUCOSE SERPL-MCNC: 94 MG/DL (ref 70–110)
GLUCOSE SERPL-MCNC: 99 MG/DL (ref 70–110)
HAV IGM SERPL QL IA: NEGATIVE
HBV CORE IGM SERPL QL IA: NEGATIVE
HBV SURFACE AG SERPL QL IA: NEGATIVE
HCT VFR BLD AUTO: 36.9 % (ref 37–48.5)
HCT VFR BLD AUTO: 38.9 % (ref 37–48.5)
HCT VFR BLD AUTO: 38.9 % (ref 37–48.5)
HCV AB S/CO SERPL IA: <0.1 S/CO RATIO (ref 0–0.9)
HGB BLD-MCNC: 11.6 G/DL (ref 12–16)
HGB BLD-MCNC: 12.5 G/DL (ref 12–16)
HGB BLD-MCNC: 12.5 G/DL (ref 12–16)
IMM GRANULOCYTES # BLD AUTO: 0.02 K/UL (ref 0–0.04)
IMM GRANULOCYTES # BLD AUTO: 0.03 K/UL (ref 0–0.04)
IMM GRANULOCYTES NFR BLD AUTO: 0.4 % (ref 0–0.5)
IMM GRANULOCYTES NFR BLD AUTO: 0.6 % (ref 0–0.5)
INR PPP: 1.1
LDH SERPL L TO P-CCNC: 1.6 MMOL/L (ref 0.5–2.2)
LYMPHOCYTES # BLD AUTO: 0.5 K/UL (ref 1–4.8)
LYMPHOCYTES # BLD AUTO: 0.6 K/UL (ref 1–4.8)
LYMPHOCYTES NFR BLD: 11.2 % (ref 18–48)
LYMPHOCYTES NFR BLD: 12.3 % (ref 18–48)
MAGNESIUM SERPL-MCNC: 2.2 MG/DL (ref 1.6–2.6)
MAGNESIUM SERPL-MCNC: 2.2 MG/DL (ref 1.6–2.6)
MAGNESIUM SERPL-MCNC: 2.3 MG/DL (ref 1.6–2.6)
MCH RBC QN AUTO: 29.3 PG (ref 27–31)
MCH RBC QN AUTO: 29.3 PG (ref 27–31)
MCH RBC QN AUTO: 29.4 PG (ref 27–31)
MCHC RBC AUTO-ENTMCNC: 31.4 G/DL (ref 32–36)
MCHC RBC AUTO-ENTMCNC: 32.1 G/DL (ref 32–36)
MCHC RBC AUTO-ENTMCNC: 32.1 G/DL (ref 32–36)
MCV RBC AUTO: 91 FL (ref 82–98)
MCV RBC AUTO: 91 FL (ref 82–98)
MCV RBC AUTO: 94 FL (ref 82–98)
MONOCYTES # BLD AUTO: 0.3 K/UL (ref 0.3–1)
MONOCYTES # BLD AUTO: 0.4 K/UL (ref 0.3–1)
MONOCYTES NFR BLD: 6.7 % (ref 4–15)
MONOCYTES NFR BLD: 7.4 % (ref 4–15)
NEUTROPHILS # BLD AUTO: 3.7 K/UL (ref 1.8–7.7)
NEUTROPHILS # BLD AUTO: 3.8 K/UL (ref 1.8–7.7)
NEUTROPHILS NFR BLD: 79.3 % (ref 38–73)
NEUTROPHILS NFR BLD: 80.4 % (ref 38–73)
NRBC BLD-RTO: 0 /100 WBC
NRBC BLD-RTO: 0 /100 WBC
PHOSPHATE SERPL-MCNC: 4.7 MG/DL (ref 2.7–4.5)
PLATELET # BLD AUTO: 143 K/UL (ref 150–350)
PLATELET # BLD AUTO: 145 K/UL (ref 150–350)
PLATELET # BLD AUTO: 145 K/UL (ref 150–350)
PMV BLD AUTO: 10.9 FL (ref 9.2–12.9)
PMV BLD AUTO: 11.3 FL (ref 9.2–12.9)
PMV BLD AUTO: 11.3 FL (ref 9.2–12.9)
POTASSIUM SERPL-SCNC: 4.5 MMOL/L (ref 3.5–5.1)
POTASSIUM SERPL-SCNC: 4.6 MMOL/L (ref 3.5–5.1)
POTASSIUM SERPL-SCNC: 4.6 MMOL/L (ref 3.5–5.1)
PROT SERPL-MCNC: 6.3 G/DL (ref 6–8.4)
PROT SERPL-MCNC: 6.6 G/DL (ref 6–8.4)
PROT SERPL-MCNC: 6.6 G/DL (ref 6–8.4)
PROTHROMBIN TIME: 13.3 SEC (ref 10.6–14.8)
RBC # BLD AUTO: 3.94 M/UL (ref 4–5.4)
RBC # BLD AUTO: 4.26 M/UL (ref 4–5.4)
RBC # BLD AUTO: 4.26 M/UL (ref 4–5.4)
SAMPLE: NORMAL
SODIUM SERPL-SCNC: 135 MMOL/L (ref 136–145)
SODIUM SERPL-SCNC: 135 MMOL/L (ref 136–145)
SODIUM SERPL-SCNC: 137 MMOL/L (ref 136–145)
TROPONIN I SERPL DL<=0.01 NG/ML-MCNC: 0.07 NG/ML (ref 0.02–0.04)
WBC # BLD AUTO: 4.65 K/UL (ref 3.9–12.7)
WBC # BLD AUTO: 4.73 K/UL (ref 3.9–12.7)
WBC # BLD AUTO: 4.73 K/UL (ref 3.9–12.7)

## 2019-10-22 PROCEDURE — G0378 HOSPITAL OBSERVATION PER HR: HCPCS

## 2019-10-22 PROCEDURE — 85610 PROTHROMBIN TIME: CPT

## 2019-10-22 PROCEDURE — 83880 ASSAY OF NATRIURETIC PEPTIDE: CPT

## 2019-10-22 PROCEDURE — 83735 ASSAY OF MAGNESIUM: CPT

## 2019-10-22 PROCEDURE — 36415 COLL VENOUS BLD VENIPUNCTURE: CPT

## 2019-10-22 PROCEDURE — 87040 BLOOD CULTURE FOR BACTERIA: CPT

## 2019-10-22 PROCEDURE — 84484 ASSAY OF TROPONIN QUANT: CPT | Mod: 91

## 2019-10-22 PROCEDURE — 94640 AIRWAY INHALATION TREATMENT: CPT

## 2019-10-22 PROCEDURE — 82962 GLUCOSE BLOOD TEST: CPT

## 2019-10-22 PROCEDURE — 25500020 PHARM REV CODE 255: Performed by: EMERGENCY MEDICINE

## 2019-10-22 PROCEDURE — 93005 ELECTROCARDIOGRAM TRACING: CPT

## 2019-10-22 PROCEDURE — 99291 CRITICAL CARE FIRST HOUR: CPT | Mod: 25

## 2019-10-22 PROCEDURE — 25000003 PHARM REV CODE 250: Performed by: EMERGENCY MEDICINE

## 2019-10-22 PROCEDURE — 96372 THER/PROPH/DIAG INJ SC/IM: CPT | Mod: 59

## 2019-10-22 PROCEDURE — 83735 ASSAY OF MAGNESIUM: CPT | Mod: 91

## 2019-10-22 PROCEDURE — 63600175 PHARM REV CODE 636 W HCPCS: Performed by: EMERGENCY MEDICINE

## 2019-10-22 PROCEDURE — 94761 N-INVAS EAR/PLS OXIMETRY MLT: CPT

## 2019-10-22 PROCEDURE — 83605 ASSAY OF LACTIC ACID: CPT

## 2019-10-22 PROCEDURE — 85025 COMPLETE CBC W/AUTO DIFF WBC: CPT

## 2019-10-22 PROCEDURE — 25000003 PHARM REV CODE 250: Performed by: INTERNAL MEDICINE

## 2019-10-22 PROCEDURE — 84100 ASSAY OF PHOSPHORUS: CPT

## 2019-10-22 PROCEDURE — 84484 ASSAY OF TROPONIN QUANT: CPT

## 2019-10-22 PROCEDURE — 85025 COMPLETE CBC W/AUTO DIFF WBC: CPT | Mod: 91

## 2019-10-22 PROCEDURE — 27000221 HC OXYGEN, UP TO 24 HOURS

## 2019-10-22 PROCEDURE — 80053 COMPREHEN METABOLIC PANEL: CPT | Mod: 91

## 2019-10-22 PROCEDURE — 25000003 PHARM REV CODE 250

## 2019-10-22 PROCEDURE — 80053 COMPREHEN METABOLIC PANEL: CPT

## 2019-10-22 RX ORDER — SEVELAMER HYDROCHLORIDE 800 MG/1
800 TABLET, FILM COATED ORAL
Status: DISCONTINUED | OUTPATIENT
Start: 2019-10-23 | End: 2019-10-25 | Stop reason: HOSPADM

## 2019-10-22 RX ORDER — IBUPROFEN 200 MG
24 TABLET ORAL
Status: DISCONTINUED | OUTPATIENT
Start: 2019-10-22 | End: 2019-10-25 | Stop reason: HOSPADM

## 2019-10-22 RX ORDER — INSULIN ASPART 100 [IU]/ML
0-5 INJECTION, SOLUTION INTRAVENOUS; SUBCUTANEOUS
Status: DISCONTINUED | OUTPATIENT
Start: 2019-10-22 | End: 2019-10-25 | Stop reason: HOSPADM

## 2019-10-22 RX ORDER — SODIUM CHLORIDE 0.9 % (FLUSH) 0.9 %
10 SYRINGE (ML) INJECTION
Status: DISCONTINUED | OUTPATIENT
Start: 2019-10-22 | End: 2019-10-25 | Stop reason: HOSPADM

## 2019-10-22 RX ORDER — DILTIAZEM HYDROCHLORIDE 180 MG/1
180 CAPSULE, COATED, EXTENDED RELEASE ORAL DAILY
Status: DISCONTINUED | OUTPATIENT
Start: 2019-10-23 | End: 2019-10-25 | Stop reason: HOSPADM

## 2019-10-22 RX ORDER — ENOXAPARIN SODIUM 100 MG/ML
1 INJECTION SUBCUTANEOUS
Status: COMPLETED | OUTPATIENT
Start: 2019-10-22 | End: 2019-10-22

## 2019-10-22 RX ORDER — CALCIUM ACETATE 667 MG/1
1334 CAPSULE ORAL
Status: DISCONTINUED | OUTPATIENT
Start: 2019-10-23 | End: 2019-10-25 | Stop reason: HOSPADM

## 2019-10-22 RX ORDER — FLUTICASONE FUROATE AND VILANTEROL 100; 25 UG/1; UG/1
1 POWDER RESPIRATORY (INHALATION) DAILY
Status: DISCONTINUED | OUTPATIENT
Start: 2019-10-23 | End: 2019-10-25 | Stop reason: HOSPADM

## 2019-10-22 RX ORDER — NAPROXEN SODIUM 220 MG/1
81 TABLET, FILM COATED ORAL DAILY
Status: DISCONTINUED | OUTPATIENT
Start: 2019-10-23 | End: 2019-10-25 | Stop reason: HOSPADM

## 2019-10-22 RX ORDER — ENOXAPARIN SODIUM 100 MG/ML
1 INJECTION SUBCUTANEOUS
Status: DISCONTINUED | OUTPATIENT
Start: 2019-10-22 | End: 2019-10-22

## 2019-10-22 RX ORDER — GLUCAGON 1 MG
1 KIT INJECTION
Status: DISCONTINUED | OUTPATIENT
Start: 2019-10-22 | End: 2019-10-25 | Stop reason: HOSPADM

## 2019-10-22 RX ORDER — LEVETIRACETAM 500 MG/1
500 TABLET ORAL
Status: DISCONTINUED | OUTPATIENT
Start: 2019-10-23 | End: 2019-10-25 | Stop reason: HOSPADM

## 2019-10-22 RX ORDER — IBUPROFEN 200 MG
16 TABLET ORAL
Status: DISCONTINUED | OUTPATIENT
Start: 2019-10-22 | End: 2019-10-25 | Stop reason: HOSPADM

## 2019-10-22 RX ORDER — LANOLIN ALCOHOL/MO/W.PET/CERES
400 CREAM (GRAM) TOPICAL DAILY
Status: DISCONTINUED | OUTPATIENT
Start: 2019-10-23 | End: 2019-10-25 | Stop reason: HOSPADM

## 2019-10-22 RX ORDER — METOPROLOL SUCCINATE 50 MG/1
50 TABLET, EXTENDED RELEASE ORAL DAILY
Status: DISCONTINUED | OUTPATIENT
Start: 2019-10-23 | End: 2019-10-25 | Stop reason: HOSPADM

## 2019-10-22 RX ORDER — TRAMADOL HYDROCHLORIDE 50 MG/1
50 TABLET ORAL
Status: DISCONTINUED | OUTPATIENT
Start: 2019-10-23 | End: 2019-10-25 | Stop reason: HOSPADM

## 2019-10-22 RX ORDER — NITROGLYCERIN 0.4 MG/1
0.4 TABLET SUBLINGUAL EVERY 5 MIN PRN
Status: DISCONTINUED | OUTPATIENT
Start: 2019-10-22 | End: 2019-10-25 | Stop reason: HOSPADM

## 2019-10-22 RX ORDER — IODIXANOL 320 MG/ML
100 INJECTION, SOLUTION INTRAVASCULAR
Status: COMPLETED | OUTPATIENT
Start: 2019-10-22 | End: 2019-10-22

## 2019-10-22 RX ORDER — ONDANSETRON 4 MG/1
4 TABLET, ORALLY DISINTEGRATING ORAL EVERY 6 HOURS PRN
Status: DISCONTINUED | OUTPATIENT
Start: 2019-10-22 | End: 2019-10-25 | Stop reason: HOSPADM

## 2019-10-22 RX ORDER — ASPIRIN 325 MG
325 TABLET ORAL
Status: COMPLETED | OUTPATIENT
Start: 2019-10-22 | End: 2019-10-22

## 2019-10-22 RX ORDER — LOSARTAN POTASSIUM 25 MG/1
25 TABLET ORAL DAILY
Status: DISCONTINUED | OUTPATIENT
Start: 2019-10-23 | End: 2019-10-25 | Stop reason: HOSPADM

## 2019-10-22 RX ORDER — ISOSORBIDE MONONITRATE 30 MG/1
30 TABLET, EXTENDED RELEASE ORAL DAILY
Status: DISCONTINUED | OUTPATIENT
Start: 2019-10-23 | End: 2019-10-25 | Stop reason: HOSPADM

## 2019-10-22 RX ORDER — ENOXAPARIN SODIUM 100 MG/ML
60 INJECTION SUBCUTANEOUS
Status: DISCONTINUED | OUTPATIENT
Start: 2019-10-22 | End: 2019-10-24

## 2019-10-22 RX ADMIN — MUPIROCIN: 20 OINTMENT TOPICAL at 01:10

## 2019-10-22 RX ADMIN — DILTIAZEM HYDROCHLORIDE 180 MG: 180 CAPSULE, COATED, EXTENDED RELEASE ORAL at 09:10

## 2019-10-22 RX ADMIN — METOPROLOL SUCCINATE 50 MG: 50 TABLET, FILM COATED, EXTENDED RELEASE ORAL at 09:10

## 2019-10-22 RX ADMIN — CHLORHEXIDINE GLUCONATE 15 ML: 1.2 RINSE ORAL at 09:10

## 2019-10-22 RX ADMIN — SEVELAMER CARBONATE 800 MG: 800 TABLET, FILM COATED ORAL at 09:10

## 2019-10-22 RX ADMIN — FAMOTIDINE 20 MG: 20 TABLET ORAL at 09:10

## 2019-10-22 RX ADMIN — ASPIRIN 81 MG 81 MG: 81 TABLET ORAL at 09:10

## 2019-10-22 RX ADMIN — ISOSORBIDE MONONITRATE 30 MG: 30 TABLET, EXTENDED RELEASE ORAL at 09:10

## 2019-10-22 RX ADMIN — LOSARTAN POTASSIUM 25 MG: 25 TABLET, FILM COATED ORAL at 09:10

## 2019-10-22 RX ADMIN — GUAIFENESIN 600 MG: 600 TABLET, EXTENDED RELEASE ORAL at 09:10

## 2019-10-22 RX ADMIN — MAGNESIUM OXIDE 400 MG: 400 TABLET ORAL at 09:10

## 2019-10-22 RX ADMIN — FLUTICASONE FUROATE AND VILANTEROL TRIFENATATE 1 PUFF: 100; 25 POWDER RESPIRATORY (INHALATION) at 09:10

## 2019-10-22 RX ADMIN — ASPIRIN 325 MG ORAL TABLET 325 MG: 325 PILL ORAL at 07:10

## 2019-10-22 RX ADMIN — CALCIUM ACETATE 1334 MG: 667 CAPSULE ORAL at 09:10

## 2019-10-22 RX ADMIN — ENOXAPARIN SODIUM 60 MG: 100 INJECTION SUBCUTANEOUS at 07:10

## 2019-10-22 RX ADMIN — IODIXANOL 100 ML: 320 INJECTION, SOLUTION INTRAVASCULAR at 06:10

## 2019-10-22 NOTE — PLAN OF CARE
10/22/19 1332   Discharge Reassessment   Assessment Type Discharge Planning Reassessment   Anticipated Discharge Disposition Home-Health   Patient choice form signed by patient/caregiver Yes     D/C orders are in with order to resume home health, as pt had [facility]. CONNER went to speak with pt about pt choice form. Pt was resting and did not wish to speak, so CONNER spoke with pt's sister Carline who confirmed that the pt and family wish to continue services with [facility] and reviewed/signed Medicare freedom of choice form. CONNER called [phone] and spoke with Heike, who stated pt was discharged from their services as of 10/20/19. CONNER informed Heike that pt is being d/c from hospital with new orders, and will fax over. [facility] to contact CONNER if there are any issues with referral.

## 2019-10-22 NOTE — PLAN OF CARE
Pt remains free from injury, received dialysis yesterday. Vs stable. Pt instructed on plan of care and safety.

## 2019-10-22 NOTE — DISCHARGE SUMMARY
[facility] Medicine  Discharge Summary      Patient Name: Griselda Neely  MRN: [id number]  Admission Date: 10/18/2019  Hospital Length of Stay: 4 days  Discharge Date and Time: 10/22/2019  2:09 PM  Attending Physician: Maris Chamorro DO   Discharging Provider: Maris Chamorro DO  Primary Care Provider: Kalie Neely MD      HPI:   74-year-old  female was known to the service, known history of AFib, congestive heart failures, end-stage renal disease on HD  and Friday, COPD and continued to smoke, severe peripheral vascular disease, calciphylaxis, history of blood transfusion, gout, hypertension, hyperlipidemia presented to the ER with complaint of chest pain and severe shortness of breath.  Upon arrival to the ER patient O2 sats were 60% heart rate was 130 and she had bilateral rales.  On Accu-Chek her blood glucose was 20 (past medical history shows diabetes but sister denies history of diabetes, reporting she does not take any pills or insulin).  Sister report she was finely last night, and this morning she woke up with complaint of not feeling well.  Sister also informed that patient was planning to skip her dialysis today to attend her cousin's  , but had to come to ER because of her condition and difficulty breathing. Patient was in respiratory distress and was started on BiPAP of IP 16, a P 6 and FiO2 40% with improvement in her O2 sat to %. Patient was in AFib with RVR upon arrival in the ER, she was given Cardizem loading dose and started on Cardizem drip. Patient had bilateral rales and clearly in heart failure, BNP was > 4500.   Lactic acid 6.3, patient also has metabolic acidosis, could possibly due to respiratory distress but at this time infection cannot be ruled out, not convincing about sepsis. Nephrology Dr. gibson was consulted through ER for urgent dialysis orders. Cardiology will be consulted Dr. Powell  Patient  "will be admitted to ICU for close monitoring.    * No surgery found *      Hospital Course:   Patient was admitted on 10/18 for acute on chronic respiratory failure thought secondary to AFib with RVR.  She initially received Cardizem loading dose then placed on Cardizem drip.  Cardiology was consulted with recommendations to resume oral Cardizem.  Troponin elevation thought secondary to demand ischemia with no plans for further workup.  Shortness of breath also thought secondary to volume overload with recommendations for nephrology consult for dialysis.  Patient is usual when he was a Friday dialysis patient.  Patient was seen and examined on day of discharge, she was tolerating diet stable on home O2 2 L and no longer complaining of chest discomfort or palpitations.  She was deemed appropriate for discharge home with resumption of her home health care.  /71   Pulse 68   Temp 97.7 °F (36.5 °C) (Oral)   Resp 14   Ht 5' 5" (1.651 m)   Wt 55 kg (121 lb 4.1 oz)   LMP  (LMP Unknown)   SpO2 (!) 94%   Breastfeeding? No   BMI 20.18 kg/m²   Gen:  Chronically ill-appearing resting comfortably in bed  Eyes PERRLA  Mouth moist mucous membranes  CV normal rate irregularly irregular  Lungs clear to auscultation bilaterally  Abdomen soft nontender nondistended      Consults:   Consults (From admission, onward)        Status Ordering Provider     Inpatient consult to Cardiology  Once     Provider:  Katharina Powell MD    Completed GABE TWAN     Inpatient consult to Hospitalist  Once     Provider:  Twan Bernal MD    Acknowledged TWAN BERNAL     Inpatient consult to Nephrology  Once     Provider:  Steve Gupta MD    Acknowledged TWAN BERNAL     Inpatient consult to Nephrology  Once     Provider:  Steve Gupta MD    Completed GABE TWAN     Inpatient consult to Registered Dietitian/Nutritionist  Once     Provider:  (Not yet assigned)    Completed TWAN BERNAL          No new Assessment & Plan notes " have been filed under this hospital service since the last note was generated.  Service: Hospital Medicine    Final Active Diagnoses:    Diagnosis Date Noted POA    PRINCIPAL PROBLEM:  Acute on chronic respiratory failure with hypoxia [J96.21] 10/18/2019 Yes    Congestive heart failure [I50.9] 10/19/2019 Yes    Pulmonary edema [J81.1] 10/19/2019 Yes    Transaminitis [R74.0] 10/19/2019 Yes    Lactic acidosis [E87.2] 10/19/2019 Yes    Chest pain [R07.9] 10/18/2019 Yes    DNR (do not resuscitate) [Z66] 09/30/2019 Yes     Chronic    Hypoglycemia [E16.2] 08/06/2019 Unknown    A-fib with RVR [I48.91] 07/18/2017 Yes    Calciphylaxis [E83.59] 07/18/2017 Yes    ESRD (end stage renal disease) on HD M,W, F [N18.6] 05/11/2016 Yes    Chronic obstructive pulmonary disease [J44.9] 10/19/2015 Yes    Tobacco dependence [F17.200] 09/09/2013 Yes    HTN (hypertension) [I10] 08/15/2013 Yes      Problems Resolved During this Admission:    Diagnosis Date Noted Date Resolved POA    Bleeding from AV fistula [R58] 10/19/2019 10/20/2019 No    Metabolic acidosis [E87.2] 10/18/2019 10/19/2019 Yes    Volume overload [E87.70] 10/18/2019 10/19/2019 Yes       Discharged Condition: stable    Disposition: Home-Health Care [facility] [place], [place]    Follow Up:  Follow-up Information     Schedule an appointment as soon as possible for a visit with Kalie Neely MD.    Specialty:  Family Medicine  Why:  As needed  Contact information:  [address]  [address]  [facility] [id number] [phone]              mwf.           Schedule an appointment as soon as possible for a visit with wound care.               Patient Instructions:      Diet renal     Activity as tolerated       Significant Diagnostic Studies: Labs:   CMP   Recent Labs   Lab 10/21/19  0456 10/22/19  0517     136 135*  135*   K 5.1  5.1 4.6  4.6   CL 91*  91* 95  95   CO2 24  24 23  23   GLU 77  77 75  75   BUN 55*  55* 32*  32*   CREATININE 6.0*  6.0* 4.8*  4.8*    CALCIUM 7.6*  7.6* 7.6*  7.6*   PROT 6.1 6.6  6.6   ALBUMIN 2.8* 2.8*  2.8*   BILITOT 1.3* 1.5*  1.5*   ALKPHOS 244* 234*  234*   AST 88* 51*  51*   * 122*  122*   ANIONGAP 21*  21* 17*  17*   ESTGFRAFRICA 7.4*  7.4* 9.7*  9.7*   EGFRNONAA 6.4*  6.4* 8.4*  8.4*   , CBC   Recent Labs   Lab 10/21/19  0456 10/22/19  0517   WBC 5.89  5.89 4.73  4.73   HGB 11.9*  11.9* 12.5  12.5   HCT 35.9*  35.9* 38.9  38.9   *  128* 145*  145*   , Troponin   Recent Labs   Lab 10/19/19  0027   TROPONINI 0.116*    and All labs within the past 24 hours have been reviewed    Pending Diagnostic Studies:     None         Medications:  Reconciled Home Medications:      Medication List      CHANGE how you take these medications    diltiaZEM 180 MG 24 hr capsule  Commonly known as:  CARDIZEM CD  Take 180 mg by mouth once daily.  What changed:  Another medication with the same name was removed. Continue taking this medication, and follow the directions you see here.        CONTINUE taking these medications    aspirin 81 MG Chew  Take 81 mg by mouth once daily.     calcium acetate 667 mg capsule  Commonly known as:  PHOSLO  Take 1,334 mg by mouth 3 (three) times daily with meals.     fluticasone furoate-vilanterol 100-25 mcg/dose diskus inhaler  Commonly known as:  BREO  Inhale 1 puff into the lungs once daily. Controller     isosorbide mononitrate 30 MG 24 hr tablet  Commonly known as:  IMDUR  Take 30 mg by mouth once daily.     levETIRAcetam 500 MG Tab  Commonly known as:  KEPPRA  Take 500 mg by mouth every Mon, Wed, Fri. After diaylsis     losartan 25 MG tablet  Commonly known as:  COZAAR  Take 25 mg by mouth once daily.     magnesium oxide 400 mg (241.3 mg magnesium) tablet  Commonly known as:  MAG-OX  Take 400 mg by mouth once daily.     metoprolol succinate 50 MG 24 hr tablet  Commonly known as:  TOPROL-XL  Take 1 tablet (50 mg total) by mouth once daily.     ondansetron 4 MG Tbdl  Commonly known as:   ZOFRAN-ODT  Take 1 tablet (4 mg total) by mouth every 6 (six) hours as needed.     oxyCODONE-acetaminophen 5-325 mg per tablet  Commonly known as:  PERCOCET  Take 1 tablet by mouth daily as needed for Pain (with dialysis).     sevelamer HCl 800 MG Tab  Commonly known as:  RENAGEL  Take 1 tablet (800 mg total) by mouth 3 (three) times daily with meals.     traMADol 50 mg tablet  Commonly known as:  ULTRAM  Take 1 tablet (50 mg total) by mouth 3 (three) times a week. 3 TIMES A WEEK AS NEEDED DURING HEMODIALYSIS        STOP taking these medications    predniSONE 10 MG tablet  Commonly known as:  DELTASONE            Indwelling Lines/Drains at time of discharge:   Lines/Drains/Airways     Drain                 Hemodialysis AV Fistula 08/08/19 0214 Right upper arm 75 days                Time spent on the discharge of patient: 32 minutes  Patient was seen and examined on the date of discharge and determined to be suitable for discharge.         Maris Chamorro DO  Department of Hospital Medicine  [facility]

## 2019-10-22 NOTE — ED PROVIDER NOTES
Encounter Date: 10/22/2019       History     Chief Complaint   Patient presents with    Chest Pain     SOB     74-year-old female presented emergency department with complaints of having slight chest pain and shortness of breath earlier today.  Patient currently denies any chest pain.  Patient states she has slight shortness of breath.  Patient said she was just discharged from hospital today and went home and started feeling this way.  Patient states she feels weak in general all over.  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  Patient denies fever or chills or nausea or vomiting or abdominal pain or any focal weakness.        Review of patient's allergies indicates:  No Known Allergies  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    both legs    CHF (congestive heart failure)     Encounter for blood transfusion 03/2016    Gout     Hypertension     Mitral valve regurgitation     Osteoarthritis     Pancreatitis     Peripheral vascular disease     Peritoneal dialysis catheter in place     Pneumonia 09/09/2017    Renal failure      Past Surgical History:   Procedure Laterality Date    ACHILLES TENDON SURGERY Right     APPENDECTOMY      CARDIAC CATHETERIZATION  07/03/2017    CHOLECYSTECTOMY      heal surgery Right     HYSTERECTOMY      PARATHYROIDECTOMY      PARATHYROIDECTOMY  07/13/2017    PERITONEAL CATHETER INSERTION      RENAL BIOPSY      vocal cord nodule       Family History   Problem Relation Age of Onset    Heart disease Sister     Early death Sister         heart as baby    Heart disease Maternal Grandfather     Diabetes Mother     Hypertension Mother     Heart failure Mother     Heart disease Mother     Arthritis Mother     Diabetes Father     Early death Sister         infant     Social History     Tobacco Use    Smoking status: Current Some Day Smoker     Packs/day: 0.50     Years: 45.00     Pack years:  22.50    Smokeless tobacco: Never Used   Substance Use Topics    Alcohol use: No    Drug use: No     Review of Systems   Constitutional: Positive for fatigue.   HENT: Negative.    Eyes: Negative.    Respiratory: Positive for shortness of breath. Negative for chest tightness.    Cardiovascular: Positive for chest pain. Negative for leg swelling.   Gastrointestinal: Negative.    Endocrine: Negative.    Genitourinary: Negative.    Musculoskeletal: Negative.    Skin: Negative.    Allergic/Immunologic: Negative.    Neurological: Negative.    Hematological: Negative.    Psychiatric/Behavioral: Negative.    All other systems reviewed and are negative.      Physical Exam     Initial Vitals   BP Pulse Resp Temp SpO2   -- 10/22/19 1658 10/22/19 1654 10/22/19 1654 10/22/19 1658    72 16 98.1 °F (36.7 °C) 96 %      MAP       --                Physical Exam    Nursing note and vitals reviewed.  Constitutional: She appears well-developed and well-nourished.   HENT:   Head: Normocephalic and atraumatic.   Nose: Nose normal.   Mouth/Throat: Oropharynx is clear and moist.   Eyes: Conjunctivae and EOM are normal. Pupils are equal, round, and reactive to light.   Neck: Normal range of motion. Neck supple. No thyromegaly present. No tracheal deviation present. No JVD present.   Cardiovascular: Normal rate, normal heart sounds and intact distal pulses.   No murmur heard.  Irregular heart rhythm   Pulmonary/Chest: Breath sounds normal. No stridor. No respiratory distress. She has no wheezes. She has no rales.   Abdominal: Soft. Bowel sounds are normal. She exhibits no distension. There is no tenderness. There is no rebound.   Musculoskeletal: Normal range of motion. She exhibits no edema.   Chronic leg pain. No calf tenderness   Neurological: She is alert and oriented to person, place, and time. She has normal strength. No cranial nerve deficit or sensory deficit. GCS score is 15. GCS eye subscore is 4. GCS verbal subscore is 5. GCS  motor subscore is 6.   Skin: Skin is warm. Capillary refill takes less than 2 seconds.   Psychiatric: She has a normal mood and affect. Thought content normal.         ED Course   Procedures  Labs Reviewed   CULTURE, BLOOD   CULTURE, BLOOD   CBC W/ AUTO DIFFERENTIAL   COMPREHENSIVE METABOLIC PANEL   TROPONIN I   B-TYPE NATRIURETIC PEPTIDE   PROTIME-INR   MAGNESIUM   LACTIC ACID, PLASMA   LACTIC ACID, PLASMA   POCT GLUCOSE     EKG Readings: (Independently Interpreted)   Initial Reading: No STEMI. Rhythm: Atrial Fibrillation. Ectopy: No Ectopy.     ECG Results          EKG 12-lead (In process)  Result time 10/22/19 17:05:12    In process by Interface, Lab In [facility] (10/22/19 17:05:12)                 Narrative:    Test Reason : R07.9,    Vent. Rate : 064 BPM     Atrial Rate : 234 BPM     P-R Int : 000 ms          QRS Dur : 098 ms      QT Int : 400 ms       P-R-T Axes : 000 -28 151 degrees     QTc Int : 412 ms    Atrial fibrillation  Septal infarct ,age undetermined  ST and T wave abnormality, consider inferolateral ischemia  Abnormal ECG  When compared with ECG of 18-OCT-2019 10:53,  Vent. rate has decreased BY  70 BPM  Left anterior fascicular block is no longer Present  T wave inversion now evident in Inferior leads  T wave inversion now evident in Anterior leads    Referred By: [email or web address]   SELF           Confirmed By:                             Imaging Results    None       X-Rays:   Independently Interpreted Readings:   Other Readings:  CTA of the chest shows pleural effusion, pericardial effusion and cardiomegaly.  Atelectasis of both lungs noted.  Based on the CT scan cannot rule out pulmonary embolism based on timing of contrast.    Medical Decision Making:   Differential Diagnosis:   74-year-old female was just discharged from hospital today.  Patient was previously treated for hypoglycemic episode and had chest pain and shortness of breath and was treated and evaluated was discharged from the hospital today  and after she went home started complaining of having shortness of breath from some chest pain.  Patient currently does not have any chest pain however has some shortness of breath.  Patient had dialysis yesterday patient is due to get dialyzed tomorrow.  Family saying she was sent home too soon and she is not ready to be sent home yet so she is back.  Patient currently feels well however given the presentation screening cardiac workup done and a PE study done given patients sedentary lifestyle.  CT scan could not rule out pulmonary embolism.  Lovenox given.  Aspirin given.  Patient currently is pain-free.  Hospital Medicine consulted for evaluation and further management  Clinical Tests:   Lab Tests: Reviewed  Radiological Study: Reviewed  Medical Tests: Reviewed                      Clinical Impression:       ICD-10-CM ICD-9-CM   1. Chest pain, unspecified type R07.9 786.50   2. Chest pain R07.9 786.50   3. Shortness of breath R06.02 786.05   4. SOB (shortness of breath) R06.02 786.05   5. End stage renal disease N18.6 585.6                                Flor Hill MD  10/22/19 1933

## 2019-10-22 NOTE — DISCHARGE INSTRUCTIONS
Notify MD of any new or unusual shortness of breath or any rapid or irregular heartbeats or any pain not relieved by medications.

## 2019-10-22 NOTE — ED NOTES
was discharged from the hospital today and began to have chest pain with SOB,  feels better now . Dialysis access to R upper arm , uses o2 at home. Complains of weakness

## 2019-10-22 NOTE — PROGRESS NOTES
Progress Note  Nephrology    Admit Date: 10/18/2019   LOS: 3 days     SUBJECTIVE:     Follow-up For:  ESRD    Feeling better but still not back to baseline.  Mild shortness of breath but no chest pain.  No pain in her leg where she has calciphylaxis wound.    Review of Systems:  GENERAL: NO fever, chills, night sweats, decreased intake.  HEENT: NO blurry vision, glasses, floaters, hearing defects, sore throat.  CV:  NO CP, Palpitations, Edema, Arrhythmias.  PULM:  SOB. NO Cough, Hemoptysis, PND, Orthopnea.  GI:  NO Nausea, Vomiting, Diarrhea, BRBPR, Melena, Abdominal Pain.  :  NO Dysuria, Frequency, Urgency, Hematuria.  NEURO: NO LOC, Seizure activity, Dizziness.  SKIN:  NO Rash, Pruritis, Petechiae.  MS:  NO Arthralgia, Muscle Aches, Arthritis.      OBJECTIVE:     Vital Signs (Most Recent)  Temp: 98 °F (36.7 °C) (10/21/19 1942)  Pulse: 80 (10/21/19 1942)  Resp: 18 (10/21/19 1942)  BP: (!) 144/79 (10/21/19 1942)  SpO2: 100 % (10/21/19 1942)    Vital Signs Range (Last 24H):  Temp:  [97 °F (36.1 °C)-98 °F (36.7 °C)]   Pulse:  [59-92]   Resp:  [16-18]   BP: (123-171)/(58-97)   SpO2:  [94 %-100 %]     I/O last 3 completed shifts:  In: 500 [Other:500]  Out: 3501 [Other:3500; Stool:1]    Physical Exam:   General: Awake and alert. In no acute distress  HEENT: No scleral icterus, MM moist.   NECK: JVD. Supple,  No swelling, No masses.  CV: Regular rate and rhthym without murmurs, rubs, gallops.  PULM: few rhonchi at the bases.  No crackles or wheezes.  ABD: Soft, nl BS, NT, ND.  EXTR: No edema, clubbing, cyanosis.   NEURO: Non focal and grossly intact.  SKIN: wound bandaged left inner thigh. No rashes, lesions, ulcers.   MS: No joint effusions.   PSYCH: Normal Mood.    Laboratory:  Recent Labs   Lab 10/21/19  0456   HGB 11.9*  11.9*   HCT 35.9*  35.9*   WBC 5.89  5.89       Recent Labs   Lab 10/19/19  0509 10/20/19  0428 10/21/19  0456    136 136  136   K 4.7 5.0 5.1  5.1   CL 91* 92* 91*  91*   CO2 26 24 24   24   BUN 29* 44* 55*  55*   CREATININE 3.9* 5.3* 6.0*  6.0*   GLU 61* 109 77  77   CALCIUM 7.4* 7.6* 7.6*  7.6*   PHOS 6.2* 5.7* 6.2*     BNP  Recent Labs   Lab 10/18/19  1125   BNP >4,500*     Problem List:    Active Hospital Problems    Diagnosis  POA    *Acute on chronic respiratory failure with hypoxia [J96.21]  Yes    Congestive heart failure [I50.9]  Yes    Pulmonary edema [J81.1]  Yes    Transaminitis [R74.0]  Yes    Lactic acidosis [E87.2]  Yes    Chest pain [R07.9]  Yes    DNR (do not resuscitate) [Z66]  Yes     Chronic    Hypoglycemia [E16.2]  Unknown    A-fib with RVR [I48.91]  Yes    Calciphylaxis [E83.59]  Yes    ESRD (end stage renal disease) on HD M,W, F [N18.6]  Yes    Chronic obstructive pulmonary disease [J44.9]  Yes    Tobacco dependence [F17.200]  Yes    HTN (hypertension) [I10]  Yes      Resolved Hospital Problems    Diagnosis Date Resolved POA    Bleeding from AV fistula [R58] 10/20/2019 No    Metabolic acidosis [E87.2] 10/19/2019 Yes    Volume overload [E87.70] 10/19/2019 Yes       Nephrology Assessment/Plan:    1.  ESRD  Maintain HD MWF schedule  No issues  Volume good     2.  CHF  Compensated clinically  UF as tolerated with dialysis  Defer to Cardiology    3.  Anemia  Above ESRD goal for hemoglobin  No need for DONNY's    4.  Metabolic Acidosis  Resolved     5.  AFib with RVR  Heart rate improved  Defer to Cardiology    6.  Secondary hyperparathyroidism  Mild elevation of phosphorus  Hypocalcemia corrects for hypoalbuminemia  Continue calcium binder    Joseph Ingram M.D.

## 2019-10-22 NOTE — CARE UPDATE
10/21/19 2020   Patient Assessment/Suction   Level of Consciousness (AVPU) alert   Respiratory Effort Unlabored   Expansion/Accessory Muscles/Retractions expansion symmetric   All Lung Fields Breath Sounds clear;diminished   Rhythm/Pattern, Respiratory pattern regular   Cough Frequency infrequent   Cough Type nonproductive   PRE-TX-O2   O2 Device (Oxygen Therapy) nasal cannula   $ Is the patient on Low Flow Oxygen? Yes   Flow (L/min) 2   SpO2 98 %   Pulse Oximetry Type Intermittent   Pulse 77   Resp 20   Aerosol Therapy   $ Aerosol Therapy Charges PRN treatment not required

## 2019-10-22 NOTE — PLAN OF CARE
10/22/19 0713   Patient Assessment/Suction   Level of Consciousness (AVPU) alert   All Lung Fields Breath Sounds crackles   PRE-TX-O2   O2 Device (Oxygen Therapy) nasal cannula   Flow (L/min) 2   SpO2 (!) 94 %   Pulse Oximetry Type Intermittent   $ Pulse Oximetry - Multiple Charge Pulse Oximetry - Multiple   Pulse 78   Resp 15   Inhaler   $ Inhaler Charges MDI (Metered Dose Inahler) Treatment   Daily Review of Necessity (Inhaler) completed   Respiratory Treatment Status (Inhaler) given   Treatment Route (Inhaler) mouthpiece   Patient Position (Inhaler) semi-Clark's   Post Treatment Assessment (Inhaler) breath sounds unchanged   Signs of Intolerance (Inhaler) none   Breath Sounds Post-Respiratory Treatment   Throughout All Fields Post-Treatment All Fields   Throughout All Fields Post-Treatment crackles   Post-treatment Heart Rate (beats/min) 78   Post-treatment Resp Rate (breaths/min) 15

## 2019-10-23 LAB
ANION GAP SERPL CALC-SCNC: 16 MMOL/L (ref 8–16)
BASOPHILS # BLD AUTO: 0.01 K/UL (ref 0–0.2)
BASOPHILS NFR BLD: 0.2 % (ref 0–1.9)
BUN SERPL-MCNC: 42 MG/DL (ref 8–23)
CALCIUM SERPL-MCNC: 7.7 MG/DL (ref 8.7–10.5)
CHLORIDE SERPL-SCNC: 96 MMOL/L (ref 95–110)
CO2 SERPL-SCNC: 25 MMOL/L (ref 23–29)
CREAT SERPL-MCNC: 6.5 MG/DL (ref 0.5–1.4)
DIFFERENTIAL METHOD: ABNORMAL
EOSINOPHIL # BLD AUTO: 0 K/UL (ref 0–0.5)
EOSINOPHIL NFR BLD: 0.8 % (ref 0–8)
ERYTHROCYTE [DISTWIDTH] IN BLOOD BY AUTOMATED COUNT: 19.9 % (ref 11.5–14.5)
EST. GFR  (AFRICAN AMERICAN): 6.7 ML/MIN/1.73 M^2
EST. GFR  (NON AFRICAN AMERICAN): 5.8 ML/MIN/1.73 M^2
GLUCOSE SERPL-MCNC: 115 MG/DL (ref 70–110)
GLUCOSE SERPL-MCNC: 143 MG/DL (ref 70–110)
GLUCOSE SERPL-MCNC: 67 MG/DL (ref 70–110)
GLUCOSE SERPL-MCNC: 79 MG/DL (ref 70–110)
GLUCOSE SERPL-MCNC: 82 MG/DL (ref 70–110)
HCT VFR BLD AUTO: 37.3 % (ref 37–48.5)
HGB BLD-MCNC: 12.2 G/DL (ref 12–16)
IMM GRANULOCYTES # BLD AUTO: 0.04 K/UL (ref 0–0.04)
IMM GRANULOCYTES NFR BLD AUTO: 0.8 % (ref 0–0.5)
LYMPHOCYTES # BLD AUTO: 0.7 K/UL (ref 1–4.8)
LYMPHOCYTES NFR BLD: 13.6 % (ref 18–48)
MAGNESIUM SERPL-MCNC: 2.5 MG/DL (ref 1.6–2.6)
MCH RBC QN AUTO: 30 PG (ref 27–31)
MCHC RBC AUTO-ENTMCNC: 32.7 G/DL (ref 32–36)
MCV RBC AUTO: 92 FL (ref 82–98)
MONOCYTES # BLD AUTO: 0.4 K/UL (ref 0.3–1)
MONOCYTES NFR BLD: 7.7 % (ref 4–15)
NEUTROPHILS # BLD AUTO: 3.8 K/UL (ref 1.8–7.7)
NEUTROPHILS NFR BLD: 76.9 % (ref 38–73)
NRBC BLD-RTO: 0 /100 WBC
PHOSPHATE SERPL-MCNC: 5.1 MG/DL (ref 2.7–4.5)
PLATELET # BLD AUTO: 168 K/UL (ref 150–350)
PMV BLD AUTO: 11.5 FL (ref 9.2–12.9)
POTASSIUM SERPL-SCNC: 4.3 MMOL/L (ref 3.5–5.1)
RBC # BLD AUTO: 4.07 M/UL (ref 4–5.4)
SODIUM SERPL-SCNC: 137 MMOL/L (ref 136–145)
TROPONIN I SERPL DL<=0.01 NG/ML-MCNC: 0.07 NG/ML (ref 0.02–0.04)
TROPONIN I SERPL DL<=0.01 NG/ML-MCNC: 0.07 NG/ML (ref 0.02–0.04)
WBC # BLD AUTO: 4.91 K/UL (ref 3.9–12.7)

## 2019-10-23 PROCEDURE — 80048 BASIC METABOLIC PNL TOTAL CA: CPT

## 2019-10-23 PROCEDURE — G0378 HOSPITAL OBSERVATION PER HR: HCPCS

## 2019-10-23 PROCEDURE — 63600175 PHARM REV CODE 636 W HCPCS: Performed by: NURSE PRACTITIONER

## 2019-10-23 PROCEDURE — 85025 COMPLETE CBC W/AUTO DIFF WBC: CPT

## 2019-10-23 PROCEDURE — 94640 AIRWAY INHALATION TREATMENT: CPT

## 2019-10-23 PROCEDURE — 25000003 PHARM REV CODE 250: Mod: GZ | Performed by: NURSE PRACTITIONER

## 2019-10-23 PROCEDURE — 96375 TX/PRO/DX INJ NEW DRUG ADDON: CPT

## 2019-10-23 PROCEDURE — 83735 ASSAY OF MAGNESIUM: CPT

## 2019-10-23 PROCEDURE — 27000221 HC OXYGEN, UP TO 24 HOURS

## 2019-10-23 PROCEDURE — 63600175 PHARM REV CODE 636 W HCPCS: Performed by: INTERNAL MEDICINE

## 2019-10-23 PROCEDURE — 36415 COLL VENOUS BLD VENIPUNCTURE: CPT

## 2019-10-23 PROCEDURE — 25000242 PHARM REV CODE 250 ALT 637 W/ HCPCS: Performed by: NURSE PRACTITIONER

## 2019-10-23 PROCEDURE — 99900035 HC TECH TIME PER 15 MIN (STAT)

## 2019-10-23 PROCEDURE — 25000003 PHARM REV CODE 250: Performed by: INTERNAL MEDICINE

## 2019-10-23 PROCEDURE — 82962 GLUCOSE BLOOD TEST: CPT

## 2019-10-23 PROCEDURE — 94761 N-INVAS EAR/PLS OXIMETRY MLT: CPT

## 2019-10-23 PROCEDURE — 96374 THER/PROPH/DIAG INJ IV PUSH: CPT

## 2019-10-23 PROCEDURE — 84484 ASSAY OF TROPONIN QUANT: CPT

## 2019-10-23 PROCEDURE — 90935 HEMODIALYSIS ONE EVALUATION: CPT

## 2019-10-23 PROCEDURE — 84100 ASSAY OF PHOSPHORUS: CPT

## 2019-10-23 PROCEDURE — 96372 THER/PROPH/DIAG INJ SC/IM: CPT | Mod: 59

## 2019-10-23 RX ORDER — CALCIUM CHLORIDE, MAGNESIUM CHLORIDE, POTASSIUM CHLORIDE AND SODIUM CHLORIDE 17.6; 325.3; 119.3; 643 MG/100ML; MG/100ML; MG/100ML; MG/100ML
SOLUTION INTRA-ARTERIAL ONCE
Status: CANCELLED | OUTPATIENT
Start: 2019-10-23 | End: 2019-10-23

## 2019-10-23 RX ORDER — SODIUM CHLORIDE 9 MG/ML
INJECTION, SOLUTION INTRAVENOUS
Status: CANCELLED | OUTPATIENT
Start: 2019-10-23

## 2019-10-23 RX ORDER — SODIUM CHLORIDE 9 MG/ML
INJECTION, SOLUTION INTRAVENOUS ONCE
Status: CANCELLED | OUTPATIENT
Start: 2019-10-23 | End: 2019-10-23

## 2019-10-23 RX ORDER — ACETAMINOPHEN 10 MG/ML
1000 INJECTION, SOLUTION INTRAVENOUS ONCE
Status: COMPLETED | OUTPATIENT
Start: 2019-10-23 | End: 2019-10-23

## 2019-10-23 RX ORDER — MUPIROCIN 20 MG/G
OINTMENT TOPICAL 2 TIMES DAILY
Status: DISCONTINUED | OUTPATIENT
Start: 2019-10-23 | End: 2019-10-25 | Stop reason: HOSPADM

## 2019-10-23 RX ADMIN — FLUTICASONE FUROATE AND VILANTEROL TRIFENATATE 1 PUFF: 100; 25 POWDER RESPIRATORY (INHALATION) at 08:10

## 2019-10-23 RX ADMIN — ACETAMINOPHEN 1000 MG: 10 INJECTION, SOLUTION INTRAVENOUS at 11:10

## 2019-10-23 RX ADMIN — ENOXAPARIN SODIUM 60 MG: 100 INJECTION SUBCUTANEOUS at 09:10

## 2019-10-23 RX ADMIN — LEVETIRACETAM 500 MG: 500 TABLET, FILM COATED ORAL at 06:10

## 2019-10-23 RX ADMIN — SODIUM THIOSULFATE 12.5 G: 250 INJECTION, SOLUTION INTRAVENOUS at 01:10

## 2019-10-23 NOTE — PLAN OF CARE
10/23/19 0953   CORTEZ Message   Medicare Outpatient and Observation Notification regarding financial responsibility Given to patient/caregiver;Explained to patient/caregiver;Signed/date by patient/caregiver   Date CORTEZ was signed 10/23/19   Time CORTEZ was signed 0907

## 2019-10-23 NOTE — PROGRESS NOTES
Progress Note  Nephrology    Admit Date: 10/22/2019   LOS: 0 days     SUBJECTIVE:     Follow-up For:  ESRD    Mrs. Griselda Neely is a 73 y/o woman with multiple medical problems including COPD, atrial fib, peripheral vascular disease, hypertension and valvular heart disease post AVR who we follow for ESRD and calciphylaxis. She was just hospitalized with similar complaints last week and required treatment for atrial for of with RVR.  She went home yesterday.  At home, she complained of substernal pressure associated with shortness of breath and returned to the emergency department.  She was hypoxic and placed on oxygen.  She is much more comfortable now.  Despite the COPD she continues to smoke and her family says that she has increased her tobacco use. Nephrology has been consulted to assist with her ESRD management.    Review of Systems:  GENERAL: NO fever, chills, night sweats, decreased intake.  HEENT: NO blurry vision, glasses, floaters, hearing defects, sore throat.  CV:  CP. No Palpitations, Edema, Arrhythmias.  PULM:  SOB, ARGUELLO. No Hemoptysis, PND, Orthopnea.  GI:  NO Nausea, Vomiting, Diarrhea, BRBPR, Melena, Abdominal Pain.  :  NO Dysuria, Frequency, Urgency, Hematuria.  NEURO: NO LOC, Seizure activity, Dizziness.  SKIN:  Painful calciphylaxis lesion left thigh. NO Rash, Pruritis, Petechiae.  MS:  NO Arthralgia, Muscle Aches, Arthritis.      OBJECTIVE:     Vital Signs (Most Recent)  Temp: 97.8 °F (36.6 °C) (10/23/19 1124)  Pulse: 82 (10/23/19 1124)  Resp: 20 (10/23/19 1124)  BP: (!) 163/88 (10/23/19 1124)  SpO2: 99 % (10/23/19 1124)    Vital Signs Range (Last 24H):  Temp:  [97.7 °F (36.5 °C)-98.4 °F (36.9 °C)]   Pulse:  [65-92]   Resp:  [14-26]   BP: (125-163)/(68-90)   SpO2:  [94 %-100 %]     No intake/output data recorded.    Physical Exam:   General: Awake and alert. In no acute distress  HEENT: No scleral icterus, MM moist.   NECK: Moderate JVD. Supple,  No swelling, No masses.  CV: Regular rate and  rhthym without murmurs, rubs, gallops.  PULM: Fairly clear with few rhonchi.  ABD: Soft, nl BS, NT, ND.  EXTR: No edema, clubbing, cyanosis.   NEURO: Non focal and grossly intact.  SKIN: Calciphylaxis lesion inner left thigh bandaged.  MS: No joint effusions.   PSYCH: Normal Mood.    Laboratory:  Recent Labs   Lab 10/23/19  0559   HGB 12.2   HCT 37.3   WBC 4.91       Recent Labs   Lab 10/21/19  0456 10/22/19  0517 10/22/19  1800 10/23/19  0559     136 135*  135* 137 137   K 5.1  5.1 4.6  4.6 4.5 4.3   CL 91*  91* 95  95 98 96   CO2 24  24 23  23 24 25   BUN 55*  55* 32*  32* 36* 42*   CREATININE 6.0*  6.0* 4.8*  4.8* 5.6* 6.5*   GLU 77  77 75  75 99 79   CALCIUM 7.6*  7.6* 7.6*  7.6* 7.8* 7.7*   PHOS 6.2* 4.7*  --  5.1*     BNP  Recent Labs   Lab 10/22/19  1800   BNP >4,500*     Problem List:    Active Hospital Problems    Diagnosis  POA    *Chest pain [R07.9]  Yes    Shortness of breath [R06.02]  Yes    ESRD (end stage renal disease) on HD M,W, F [N18.6]  Yes    DM (diabetes mellitus) [E11.9]  Yes    HTN (hypertension) [I10]  Yes      Resolved Hospital Problems   No resolved problems to display.       Nephrology Assessment/Plan:    1. Chest pain  Doubt cardiac but will defer to cards  Still smokes and counseled in that regard    2. COPD  Severe  Bronchodilators per pulmonary  Counseled to stop smoking    3. ESRD  Dialysis today and MWF  No issues  UF as tolerated but volume looks good    4.  End-of-life issues  I counseled the patient with her family present and suggested that if she was not willing to quit smoking that she may consider hospice.  I do not think she is at that point yet further discussion as appropriate.    Thank you Dr. La for allowing our group to share and Mrs. Neely' care. We will follow-up with you while she is here.    Joseph Ingram M.D.

## 2019-10-23 NOTE — ASSESSMENT & PLAN NOTE
Likely multifactorial: smoker- unwilling to quit, pulmonary hypertension, COPD, and volume overload   PE cannot be excluded per CTA chest  Therapeutic lovenox  Consult renal for HD  O2 supplements

## 2019-10-23 NOTE — CONSULTS
74 yr old female  Family at bedside       10/23/19 1554        Pressure Injury 10/23/19 1300 Right Buttocks Stage 2   Date First Assessed/Time First Assessed: 10/23/19 1300   Pressure Injury Present on Admission: yes  Side: Right  Location: Buttocks  Staging: Stage 2   Wound Image    Staging Stage 2   Dressing Appearance Open to air   Drainage Amount None   Appearance Hines   Periwound Area Intact  (scar)   Wound Length (cm) 0.8 cm   Wound Width (cm) 0.8 cm   Wound Depth (cm) 0.1 cm   Wound Volume (cm^3) 0.06 cm^3   Wound Surface Area (cm^2) 0.64 cm^2   Recommendation: Clean with chlorhexidine/ns.  Pat dry. Apply Medihoney and cover with mepilex.   10/23/19 1556        Wound 10/23/19 1021 Other (comment) medial Thigh #1   Date First Assessed/Time First Assessed: 10/23/19 1021   Pre-existing: Yes  Primary Wound Type: (c) Other (comment)  Side: Left  Orientation: (c) medial  Location: Thigh  Wound/PI Number (optional): #1   Wound Image    Dressing Appearance Intact;Moist drainage   Drainage Amount Small   Drainage Characteristics/Odor Brown   Appearance Tan;Gray;Slough   Tissue loss description Partial thickness   Periwound Area Intact   Wound Edges Irregular   Wound Length (cm)   (1.5x1.5x1  1x1x.8xx2.5x3x1)   Care Cleansed with:;Antimicrobial agent   Dressing Applied;Calcium alginate;Island/border   Dressing Change Due 10/25/19    Recommendation: Clean with chlohrexidine/ns  pat dry  cover open wound with aquacel ag, and mepilex.     10/23/19 1600        Wound 10/23/19 1554  medial Leg   Date First Assessed/Time First Assessed: 10/23/19 1554   Pre-existing: Yes  Primary Wound Type: (c)   Side: Right  Orientation: medial  Location: (c) Leg   Wound Image    Dressing Appearance Open to air   Drainage Amount None   Appearance   (scar)   Recommendation: Clean daily and protect.     Turn reposition q2 as pt's condition will allow.  Bilat heel pillows   Float and elevate heels of bed with pillows.

## 2019-10-23 NOTE — HPI
The patient is a 74-year-old  female with history of AFib, congestive heart failures, end-stage renal disease on HD Monday Wednesday and Friday, COPD and continued to smoke, severe peripheral vascular disease, calciphylaxis, history of blood transfusion, gout, hypertension, and hyperlipidemia. She presented to the ED with chest pain and severe shortness of breath. She was discharged home earlier today. She was admitted here on 10/18/2019 for acute on chronic respiratory failure thought to be secondary to AFib with RVR. She initially received Cardizem loading dose then placed on Cardizem drip.  Cardiology was consulted with recommendations to resume oral Cardizem.  Her troponin levels were elevated,  thought to be secondary to demand ischemia with no plans for further workup.  Shortness of breath also thought to be secondary to volume overload. Nephrology was consulted for dialysis.      She states that she got home around 1 PM today. She has intermittent chest pain but cannot describe her pain, duration, its location, radiation, associated symptoms, aggravating or alleviating factors. Currently she denies pain. She also reports severe shortness of breath associated with activities. She cannot state what she was doing at the time of her shortness of breath. She denies fever or chills. She reports nonproductive cough. She denies abdominal pain but reports nausea and vomiting. She states that she vomited 3 times at home. Currently, she denies shortness of breath. Oxygen saturation is 98 -99% on 2 L nasal cannula.      CTA chest in the ED was inconclusive for PE. She received Lovenox 60 mg subq in the ED.

## 2019-10-23 NOTE — PROGRESS NOTES
Renal Dosing of Medication (enoxaparin)    An order has been entered by a pharmacist to adjust the dose and/or frequency of the medication listed below based on the patient's renal function.    Objective:  74 y.o., female, Actual Body Weight = 58.1 kg (128 lb)    The lovenox dose has been adjusted per protocol from 1mg/kg SQ every 12 hours to 1mg/kg SQ every 24 hours based on CrCl and current weight of 58.1 kg.    Assessment:  Estimated Creatinine Clearance: 8.1 mL/min (A) (based on SCr of 5.6 mg/dL (H)).      Thank you for allowing us to participate in this patient's care.     Diya May 10/22/2019 8:22 PM  Department of Pharmacy  Ext [phone]

## 2019-10-23 NOTE — H&P
[facility] Medicine  History & Physical  Date of Service: 10/22/2019 at 1940    Patient Name: Griselda Neely  MRN: [id number]  Admission Date: 10/22/2019  Attending Physician: Franky Maria MD  Primary Care Provider: Kalie Neely MD         Patient information was obtained from patient, relative(s), past medical records and ER records.     Subjective:     Principal Problem:Chest pain    Chief Complaint:   Chief Complaint   Patient presents with    Chest Pain     SOB        HPI: The patient is a 74-year-old  female with history of AFib, congestive heart failures, end-stage renal disease on HD Monday Wednesday and Friday, COPD and continued to smoke, severe peripheral vascular disease, calciphylaxis, history of blood transfusion, gout, hypertension, and hyperlipidemia. She presented to the ED with chest pain and severe shortness of breath. She was discharged home earlier today. She was admitted here on 10/18/2019 for acute on chronic respiratory failure thought to be secondary to AFib with RVR. She initially received Cardizem loading dose then placed on Cardizem drip.  Cardiology was consulted with recommendations to resume oral Cardizem.   Her troponin levels were elevated,  thought to be secondary to demand ischemia with no plans for further workup.  Shortness of breath also thought to be secondary to volume overload. Nephrology was consulted for dialysis.      She states that she got home around 1 PM today. She has intermittent chest pain but cannot describe her pain, duration, its location, radiation, associated symptoms, aggravating or alleviating factors. Currently she denies pain. She also reports severe shortness of breath associated with activities. She cannot state what she was doing at the time of her shortness of breath. She denies fever or chills. She reports nonproductive cough. She denies abdominal pain but reports nausea and vomiting. She states that she  vomited 3 times at home. Currently, she denies shortness of breath. Oxygen saturation is 98 -99% on 2 L nasal cannula.      CTA chest in the ED was inconclusive for PE. She received Lovenox 60 mg subq in the ED.    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    both legs    CHF (congestive heart failure)     Encounter for blood transfusion 03/2016    Gout     Hypertension     Mitral valve regurgitation     Osteoarthritis     Pancreatitis     Peripheral vascular disease     Peritoneal dialysis catheter in place     Pneumonia 09/09/2017    Renal failure        Past Surgical History:   Procedure Laterality Date    ACHILLES TENDON SURGERY Right     APPENDECTOMY      CARDIAC CATHETERIZATION  07/03/2017    CHOLECYSTECTOMY      heal surgery Right     HYSTERECTOMY      PARATHYROIDECTOMY      PARATHYROIDECTOMY  07/13/2017    PERITONEAL CATHETER INSERTION      RENAL BIOPSY      vocal cord nodule         Review of patient's allergies indicates:  No Known Allergies    Current Facility-Administered Medications on File Prior to Encounter   Medication    [DISCONTINUED] aspirin chewable tablet 81 mg    [DISCONTINUED] calcium acetate capsule 1,334 mg    [DISCONTINUED] chlorhexidine 0.12 % solution 15 mL    [DISCONTINUED] dextrose 50% injection 12.5 g    [DISCONTINUED] dextrose 50% injection 25 g    [DISCONTINUED] diltiaZEM 24 hr capsule 180 mg    [DISCONTINUED] enoxaparin injection 30 mg    [DISCONTINUED] famotidine tablet 20 mg    [DISCONTINUED] fluticasone furoate-vilanterol 100-25 mcg/dose diskus inhaler 1 puff    [DISCONTINUED] glucagon (human recombinant) injection 1 mg    [DISCONTINUED] glucose chewable tablet 16 g    [DISCONTINUED] glucose chewable tablet 24 g    [DISCONTINUED] guaiFENesin 12 hr tablet 600 mg    [DISCONTINUED] HYDROcodone-acetaminophen 7.5-325 mg per tablet 1 tablet    [DISCONTINUED] ipratropium 0.02 % nebulizer solution 0.5 mg     [DISCONTINUED] isosorbide mononitrate 24 hr tablet 30 mg    [DISCONTINUED] levalbuterol nebulizer solution 0.63 mg    [DISCONTINUED] levETIRAcetam tablet 500 mg    [DISCONTINUED] losartan tablet 25 mg    [DISCONTINUED] magnesium oxide tablet 400 mg    [DISCONTINUED] metoprolol succinate (TOPROL-XL) 24 hr tablet 50 mg    [DISCONTINUED] mupirocin 2 % ointment    [DISCONTINUED] ondansetron injection 4 mg    [DISCONTINUED] polyethylene glycol packet 17 g    [DISCONTINUED] sevelamer carbonate tablet 800 mg    [DISCONTINUED] sodium chloride 0.9% flush 10 mL    [DISCONTINUED] traMADol tablet 50 mg     Current Outpatient Medications on File Prior to Encounter   Medication Sig    aspirin 81 MG Chew Take 81 mg by mouth once daily.     calcium acetate (PHOSLO) 667 mg capsule Take 1,334 mg by mouth 3 (three) times daily with meals.    diltiaZEM (CARDIZEM CD) 180 MG 24 hr capsule Take 180 mg by mouth once daily.    fluticasone furoate-vilanterol (BREO) 100-25 mcg/dose diskus inhaler Inhale 1 puff into the lungs once daily. Controller    isosorbide mononitrate (IMDUR) 30 MG 24 hr tablet Take 30 mg by mouth once daily.    levETIRAcetam (KEPPRA) 500 MG Tab Take 500 mg by mouth every Mon, Wed, Fri. After diaylsis    losartan (COZAAR) 25 MG tablet Take 25 mg by mouth once daily.    magnesium oxide (MAG-OX) 400 mg (241.3 mg magnesium) tablet Take 400 mg by mouth once daily.    metoprolol succinate (TOPROL-XL) 50 MG 24 hr tablet Take 1 tablet (50 mg total) by mouth once daily. (Patient taking differently: Take 50 mg by mouth once daily. )    ondansetron (ZOFRAN-ODT) 4 MG TbDL Take 1 tablet (4 mg total) by mouth every 6 (six) hours as needed.    oxyCODONE-acetaminophen (PERCOCET) 5-325 mg per tablet Take 1 tablet by mouth daily as needed for Pain (with dialysis).     sevelamer HCl (RENAGEL) 800 MG Tab Take 1 tablet (800 mg total) by mouth 3 (three) times daily with meals.    traMADol (ULTRAM) 50 mg tablet Take 1  tablet (50 mg total) by mouth 3 (three) times a week. 3 TIMES A WEEK AS NEEDED DURING HEMODIALYSIS    [DISCONTINUED] diltiaZEM (CARDIZEM) 60 MG tablet Take 60 mg by mouth 3 (three) times daily.    [DISCONTINUED] predniSONE (DELTASONE) 10 MG tablet Take 30 mg (3 tabs) x5 days, take 20 mg (2 tabs) x5 days, take 10 mg (1 tab) x5 days     Family History     Problem Relation (Age of Onset)    Arthritis Mother    Diabetes Mother, Father    Early death Sister, Sister    Heart disease Sister, Maternal Grandfather, Mother    Heart failure Mother    Hypertension Mother        Tobacco Use    Smoking status: Current Some Day Smoker     Packs/day: 0.50     Years: 45.00     Pack years: 22.50    Smokeless tobacco: Never Used   Substance and Sexual Activity    Alcohol use: No    Drug use: No    Sexual activity: Not on file     Review of Systems   All other systems reviewed and are negative.    Objective:     Vital Signs (Most Recent):  Temp: 98.1 °F (36.7 °C) (10/22/19 1658)  Pulse: 74 (10/22/19 2000)  Resp: (!) 26 (10/22/19 2000)  BP: (!) 154/76 (10/22/19 2000)  SpO2: 97 % (10/22/19 2000) Vital Signs (24h Range):  Temp:  [97.7 °F (36.5 °C)-98.2 °F (36.8 °C)] 98.1 °F (36.7 °C)  Pulse:  [67-82] 74  Resp:  [14-26] 26  SpO2:  [93 %-98 %] 97 %  BP: (122-160)/(68-89) 154/76     Weight: 58.1 kg (128 lb)  Body mass index is 21.3 kg/m².    Physical Exam   Constitutional: She is oriented to person, place, and time.   Thin elderly female   Eyes: EOM are normal.   Neck: Normal range of motion.   Cardiovascular:   Irregular rate and rhythm   Pulmonary/Chest: No stridor. She has no wheezes. She exhibits no tenderness.   Diminished breath sounds at bases bilat   Abdominal: Soft. Bowel sounds are normal.   Genitourinary: Vagina normal.   Neurological: She is alert and oriented to person, place, and time.   Skin: Skin is warm and dry. Capillary refill takes less than 2 seconds.   Psychiatric: She has a normal mood and affect.   Vitals  reviewed.        CRANIAL NERVES     CN III, IV, VI   Extraocular motions are normal.        Significant Labs:   CBC:   Recent Labs   Lab 10/21/19  0456 10/22/19  0517 10/22/19  1800   WBC 5.89  5.89 4.73  4.73 4.65   HGB 11.9*  11.9* 12.5  12.5 11.6*   HCT 35.9*  35.9* 38.9  38.9 36.9*   *  128* 145*  145* 143*     CMP:   Recent Labs   Lab 10/21/19  0456 10/22/19  0517 10/22/19  1800     136 135*  135* 137   K 5.1  5.1 4.6  4.6 4.5   CL 91*  91* 95  95 98   CO2 24  24 23  23 24   GLU 77  77 75  75 99   BUN 55*  55* 32*  32* 36*   CREATININE 6.0*  6.0* 4.8*  4.8* 5.6*   CALCIUM 7.6*  7.6* 7.6*  7.6* 7.8*   PROT 6.1 6.6  6.6 6.3   ALBUMIN 2.8* 2.8*  2.8* 2.9*   BILITOT 1.3* 1.5*  1.5* 1.3*   ALKPHOS 244* 234*  234* 219*   AST 88* 51*  51* 41*   * 122*  122* 107*   ANIONGAP 21*  21* 17*  17* 15   EGFRNONAA 6.4*  6.4* 8.4*  8.4* 7.0*     Cardiac Markers:   Recent Labs   Lab 10/22/19  1800   BNP >4,500*     Troponin:   Recent Labs   Lab 10/22/19  1800   TROPONINI 0.070*     All pertinent labs within the past 24 hours have been reviewed.    Significant Imaging:   Cta Chest Non-coronary (pe Study)    Result Date: 10/22/2019  CMS MANDATED QUALITY DATA - CT RADIATION - 436 All CT scans at this facility utilize dose modulation, iterative reconstruction, and/or weight based dosing when appropriate to reduce radiation dose to as low as reasonably achievable. The study was performed with thin sections with subsequent 3-D reformations  and reconstructions at multiple planes with images permanently stored in the PACS system. EXAMINATION: CTA CHEST NON CORONARY CLINICAL HISTORY: Chest pain, acute, nonspecific, low prob CAD; TECHNIQUE: The study was performed with thin sections with subsequent 3-D reformations  and reconstructions at multiple planes with images permanently stored in the PACS system. The study utilizes 100 cc of intravenous nonionic contrast material. COMPARISON:  None. FINDINGS: Contrast bolus timing is suboptimal.  There is a questionable partially occlusive filling defect within 1 of the right upper lobe pulmonary arteries (image 101).  Similarly, there is a questionable defect observed within the right lower lobe pulmonary artery (image 145).  Heterogeneous enhancement is observed within the left lower lobe pulmonary artery.  This appearance may relate to admixing of enhanced and unenhanced blood.  Partially occlusive thrombi however are not excluded.  Additionally, there is suboptimal opacification of basilar branch pulmonary arteries of the right lower lobe. The heart is enlarged.  There is a small pericardial effusion.  There is prominent calcified plaque formation within the aorta and branch vessels including the coronary arteries.  Calcification is seen at the level of the mitral valve annulus There are small bilateral pleural effusions, right greater than left.  The right-sided pleural fluid has a partially loculated appearance. There are dependent pulmonary opacities which likely reflect atelectasis.  No confluent infiltrates are observed.  Emphysematous changes are demonstrated within the lungs.  There are prior bilateral rib fractures. There is a small amount of free fluid within the visualized portion of the upper abdomen.     Areas of heterogeneous opacification of right upper, right lower and left lower pulmonary arteries and suboptimal opacification of basilar branches of the right lower lobe.  Pulmonary embolism is not excluded.  This appearance could be contributed to by admixture of blood and contrast. Cardiomegaly and small pericardial effusion. Prominent calcified plaque formation within the aorta and branch vessels including the coronary arteries. Bilateral pleural effusions, right larger than left.  There is partial loculation of right-sided pleural fluid. Dependent pulmonary opacities, likely reflection of atelectasis. Small amount of ascites.  Electronically signed by: Camacho Arce IV., MD Date:    10/22/2019 Time:    19:06    X-ray Chest Ap Portable    Result Date: 10/22/2019  EXAMINATION: XR CHEST AP PORTABLE CLINICAL HISTORY: CHF; COMPARISON: 10/20/2019. FINDINGS: The heart is enlarged but stable.  Prominence of the central pulmonary vasculature remains unchanged.  Patchy ground-glass type opacities persist within the mid lung zones bilaterally with more focal airspace opacities or volume loss noted in the right lower lung zone, similar to the prior study.  Blunting of the right costophrenic angle is slightly greater on the current examination in compatible with small right-sided effusion. A prosthetic aortic valve is again noted.  There is arteriosclerosis and tortuosity of the aorta.  The bones are demineralized.     Cardiomegaly and central pulmonary vascular prominence. Bilateral airspace opacities, right greater than left, similar to the prior exam. Small right-sided pleural effusion. Electronically signed by: Camacho Arce IV., MD Date:    10/22/2019 Time:    17:55    X-ray Chest Ap Portable    Result Date: 10/20/2019  EXAMINATION: XR CHEST AP PORTABLE CLINICAL HISTORY: persisting cough, hypoxemia; COMPARISON: 10/18/2019 FINDINGS: Moderate cardiopericardial silhouette enlargement is stable from prior.  Atherosclerotic calcification of the aorta.  Central pulmonary vascularity is stable.  Prosthetic cardiac valve. Confluent opacity at the right lung base could reflect pleural fluid, airspace disease, or some combination thereof - this appears similar to prior.  Pleural fluid tracks into the right minor fissure.  There is also focal airspace opacity of the left mid lung which appears new from prior.  No pneumothorax.  No acute osseous abnormality.     Confluent opacity at the right lung base potentially reflecting pleural fluid, airspace disease, or some combination thereof. New focal left mid lung alveolar opacity, concerning for pneumonia. Stable  cardiomegaly. Electronically signed by: Harish Liu MD Date:    10/20/2019 Time:    08:55    EKG, personally reviewed: Atrial fibrillation, rate 64, no ST elevation    Assessment/Plan:     * Chest pain  With chronically elevated troponin, likely due to decrease clearance from ESRD  Trend troponin  Consult cardiology      Shortness of breath  Likely multifactorial: smoker- unwilling to quit, pulmonary hypertension, COPD, and volume overload   PE cannot be excluded per CTA chest  Therapeutic lovenox  Consult renal for HD  O2 supplements    ESRD (end stage renal disease) on HD M,W, F  Renal diet  Consult renal for HD      DM (diabetes mellitus)  Monitor blood glucose  SSI      HTN (hypertension)  Monitor  Resume home medication      VTE Risk Mitigation (From admission, onward)         Ordered     enoxaparin injection 60 mg  Every 24 hours (non-standard times)      10/22/19 2019     Place sequential compression device  Until discontinued      10/22/19 2016     IP VTE HIGH RISK PATIENT  Once      10/22/19 2016                   REGINA Ca  Department of Hospital Medicine   [facility]

## 2019-10-23 NOTE — CARE UPDATE
10/23/19 0827   PRE-TX-O2   O2 Device (Oxygen Therapy) nasal cannula   $ Is the patient on Low Flow Oxygen? Yes   Flow (L/min) 2   SpO2 100 %   Pulse Oximetry Type Intermittent   $ Pulse Oximetry - Multiple Charge Pulse Oximetry - Multiple   Pulse 67   Resp (!) 24   Inhaler   $ Inhaler Charges MDI (Metered Dose Inahler) Treatment;Independent;Mouth rinsed post treatment   Daily Review of Necessity (Inhaler) completed   Respiratory Treatment Status (Inhaler) given;independent;mouth rinsed post treatment   Treatment Route (Inhaler) mouthpiece   Patient Position (Inhaler) HOB elevated   Post Treatment Assessment (Inhaler) breath sounds unchanged   Signs of Intolerance (Inhaler) none

## 2019-10-23 NOTE — ASSESSMENT & PLAN NOTE
With chronically elevated troponin, likely due to decrease clearance from ESRD  Trend troponin  Consult cardiology

## 2019-10-23 NOTE — SUBJECTIVE & OBJECTIVE
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    both legs    CHF (congestive heart failure)     Encounter for blood transfusion 03/2016    Gout     Hypertension     Mitral valve regurgitation     Osteoarthritis     Pancreatitis     Peripheral vascular disease     Peritoneal dialysis catheter in place     Pneumonia 09/09/2017    Renal failure        Past Surgical History:   Procedure Laterality Date    ACHILLES TENDON SURGERY Right     APPENDECTOMY      CARDIAC CATHETERIZATION  07/03/2017    CHOLECYSTECTOMY      heal surgery Right     HYSTERECTOMY      PARATHYROIDECTOMY      PARATHYROIDECTOMY  07/13/2017    PERITONEAL CATHETER INSERTION      RENAL BIOPSY      vocal cord nodule         Review of patient's allergies indicates:  No Known Allergies    Current Facility-Administered Medications on File Prior to Encounter   Medication    [DISCONTINUED] aspirin chewable tablet 81 mg    [DISCONTINUED] calcium acetate capsule 1,334 mg    [DISCONTINUED] chlorhexidine 0.12 % solution 15 mL    [DISCONTINUED] dextrose 50% injection 12.5 g    [DISCONTINUED] dextrose 50% injection 25 g    [DISCONTINUED] diltiaZEM 24 hr capsule 180 mg    [DISCONTINUED] enoxaparin injection 30 mg    [DISCONTINUED] famotidine tablet 20 mg    [DISCONTINUED] fluticasone furoate-vilanterol 100-25 mcg/dose diskus inhaler 1 puff    [DISCONTINUED] glucagon (human recombinant) injection 1 mg    [DISCONTINUED] glucose chewable tablet 16 g    [DISCONTINUED] glucose chewable tablet 24 g    [DISCONTINUED] guaiFENesin 12 hr tablet 600 mg    [DISCONTINUED] HYDROcodone-acetaminophen 7.5-325 mg per tablet 1 tablet    [DISCONTINUED] ipratropium 0.02 % nebulizer solution 0.5 mg    [DISCONTINUED] isosorbide mononitrate 24 hr tablet 30 mg    [DISCONTINUED] levalbuterol nebulizer solution 0.63 mg    [DISCONTINUED] levETIRAcetam tablet 500 mg    [DISCONTINUED] losartan tablet 25 mg    [DISCONTINUED]  magnesium oxide tablet 400 mg    [DISCONTINUED] metoprolol succinate (TOPROL-XL) 24 hr tablet 50 mg    [DISCONTINUED] mupirocin 2 % ointment    [DISCONTINUED] ondansetron injection 4 mg    [DISCONTINUED] polyethylene glycol packet 17 g    [DISCONTINUED] sevelamer carbonate tablet 800 mg    [DISCONTINUED] sodium chloride 0.9% flush 10 mL    [DISCONTINUED] traMADol tablet 50 mg     Current Outpatient Medications on File Prior to Encounter   Medication Sig    aspirin 81 MG Chew Take 81 mg by mouth once daily.     calcium acetate (PHOSLO) 667 mg capsule Take 1,334 mg by mouth 3 (three) times daily with meals.    diltiaZEM (CARDIZEM CD) 180 MG 24 hr capsule Take 180 mg by mouth once daily.    fluticasone furoate-vilanterol (BREO) 100-25 mcg/dose diskus inhaler Inhale 1 puff into the lungs once daily. Controller    isosorbide mononitrate (IMDUR) 30 MG 24 hr tablet Take 30 mg by mouth once daily.    levETIRAcetam (KEPPRA) 500 MG Tab Take 500 mg by mouth every Mon, Wed, Fri. After diaylsis    losartan (COZAAR) 25 MG tablet Take 25 mg by mouth once daily.    magnesium oxide (MAG-OX) 400 mg (241.3 mg magnesium) tablet Take 400 mg by mouth once daily.    metoprolol succinate (TOPROL-XL) 50 MG 24 hr tablet Take 1 tablet (50 mg total) by mouth once daily. (Patient taking differently: Take 50 mg by mouth once daily. )    ondansetron (ZOFRAN-ODT) 4 MG TbDL Take 1 tablet (4 mg total) by mouth every 6 (six) hours as needed.    oxyCODONE-acetaminophen (PERCOCET) 5-325 mg per tablet Take 1 tablet by mouth daily as needed for Pain (with dialysis).     sevelamer HCl (RENAGEL) 800 MG Tab Take 1 tablet (800 mg total) by mouth 3 (three) times daily with meals.    traMADol (ULTRAM) 50 mg tablet Take 1 tablet (50 mg total) by mouth 3 (three) times a week. 3 TIMES A WEEK AS NEEDED DURING HEMODIALYSIS    [DISCONTINUED] diltiaZEM (CARDIZEM) 60 MG tablet Take 60 mg by mouth 3 (three) times daily.    [DISCONTINUED]  predniSONE (DELTASONE) 10 MG tablet Take 30 mg (3 tabs) x5 days, take 20 mg (2 tabs) x5 days, take 10 mg (1 tab) x5 days     Family History     Problem Relation (Age of Onset)    Arthritis Mother    Diabetes Mother, Father    Early death Sister, Sister    Heart disease Sister, Maternal Grandfather, Mother    Heart failure Mother    Hypertension Mother        Tobacco Use    Smoking status: Current Some Day Smoker     Packs/day: 0.50     Years: 45.00     Pack years: 22.50    Smokeless tobacco: Never Used   Substance and Sexual Activity    Alcohol use: No    Drug use: No    Sexual activity: Not on file     Review of Systems   All other systems reviewed and are negative.    Objective:     Vital Signs (Most Recent):  Temp: 98.1 °F (36.7 °C) (10/22/19 1658)  Pulse: 74 (10/22/19 2000)  Resp: (!) 26 (10/22/19 2000)  BP: (!) 154/76 (10/22/19 2000)  SpO2: 97 % (10/22/19 2000) Vital Signs (24h Range):  Temp:  [97.7 °F (36.5 °C)-98.2 °F (36.8 °C)] 98.1 °F (36.7 °C)  Pulse:  [67-82] 74  Resp:  [14-26] 26  SpO2:  [93 %-98 %] 97 %  BP: (122-160)/(68-89) 154/76     Weight: 58.1 kg (128 lb)  Body mass index is 21.3 kg/m².    Physical Exam   Constitutional: She is oriented to person, place, and time.   Thin elderly female   Eyes: EOM are normal.   Neck: Normal range of motion.   Cardiovascular:   Irregular rate and rhythm   Pulmonary/Chest: No stridor. She has no wheezes. She exhibits no tenderness.   Diminished breath sounds at bases bilat   Abdominal: Soft. Bowel sounds are normal.   Genitourinary: Vagina normal.   Neurological: She is alert and oriented to person, place, and time.   Skin: Skin is warm and dry. Capillary refill takes less than 2 seconds.   Psychiatric: She has a normal mood and affect.   Vitals reviewed.        CRANIAL NERVES     CN III, IV, VI   Extraocular motions are normal.        Significant Labs:   CBC:   Recent Labs   Lab 10/21/19  0456 10/22/19  0517 10/22/19  1800   WBC 5.89  5.89 4.73  4.73 4.65    HGB 11.9*  11.9* 12.5  12.5 11.6*   HCT 35.9*  35.9* 38.9  38.9 36.9*   *  128* 145*  145* 143*     CMP:   Recent Labs   Lab 10/21/19  0456 10/22/19  0517 10/22/19  1800     136 135*  135* 137   K 5.1  5.1 4.6  4.6 4.5   CL 91*  91* 95  95 98   CO2 24  24 23  23 24   GLU 77  77 75  75 99   BUN 55*  55* 32*  32* 36*   CREATININE 6.0*  6.0* 4.8*  4.8* 5.6*   CALCIUM 7.6*  7.6* 7.6*  7.6* 7.8*   PROT 6.1 6.6  6.6 6.3   ALBUMIN 2.8* 2.8*  2.8* 2.9*   BILITOT 1.3* 1.5*  1.5* 1.3*   ALKPHOS 244* 234*  234* 219*   AST 88* 51*  51* 41*   * 122*  122* 107*   ANIONGAP 21*  21* 17*  17* 15   EGFRNONAA 6.4*  6.4* 8.4*  8.4* 7.0*     Cardiac Markers:   Recent Labs   Lab 10/22/19  1800   BNP >4,500*     Troponin:   Recent Labs   Lab 10/22/19  1800   TROPONINI 0.070*     All pertinent labs within the past 24 hours have been reviewed.    Significant Imaging:   Cta Chest Non-coronary (pe Study)    Result Date: 10/22/2019  CMS MANDATED QUALITY DATA - CT RADIATION - 436 All CT scans at this facility utilize dose modulation, iterative reconstruction, and/or weight based dosing when appropriate to reduce radiation dose to as low as reasonably achievable. The study was performed with thin sections with subsequent 3-D reformations  and reconstructions at multiple planes with images permanently stored in the PACS system. EXAMINATION: CTA CHEST NON CORONARY CLINICAL HISTORY: Chest pain, acute, nonspecific, low prob CAD; TECHNIQUE: The study was performed with thin sections with subsequent 3-D reformations  and reconstructions at multiple planes with images permanently stored in the PACS system. The study utilizes 100 cc of intravenous nonionic contrast material. COMPARISON: None. FINDINGS: Contrast bolus timing is suboptimal.  There is a questionable partially occlusive filling defect within 1 of the right upper lobe pulmonary arteries (image 101).  Similarly, there is a questionable  defect observed within the right lower lobe pulmonary artery (image 145).  Heterogeneous enhancement is observed within the left lower lobe pulmonary artery.  This appearance may relate to admixing of enhanced and unenhanced blood.  Partially occlusive thrombi however are not excluded.  Additionally, there is suboptimal opacification of basilar branch pulmonary arteries of the right lower lobe. The heart is enlarged.  There is a small pericardial effusion.  There is prominent calcified plaque formation within the aorta and branch vessels including the coronary arteries.  Calcification is seen at the level of the mitral valve annulus There are small bilateral pleural effusions, right greater than left.  The right-sided pleural fluid has a partially loculated appearance. There are dependent pulmonary opacities which likely reflect atelectasis.  No confluent infiltrates are observed.  Emphysematous changes are demonstrated within the lungs.  There are prior bilateral rib fractures. There is a small amount of free fluid within the visualized portion of the upper abdomen.     Areas of heterogeneous opacification of right upper, right lower and left lower pulmonary arteries and suboptimal opacification of basilar branches of the right lower lobe.  Pulmonary embolism is not excluded.  This appearance could be contributed to by admixture of blood and contrast. Cardiomegaly and small pericardial effusion. Prominent calcified plaque formation within the aorta and branch vessels including the coronary arteries. Bilateral pleural effusions, right larger than left.  There is partial loculation of right-sided pleural fluid. Dependent pulmonary opacities, likely reflection of atelectasis. Small amount of ascites. Electronically signed by: Camacho Arce IV., MD Date:    10/22/2019 Time:    19:06    X-ray Chest Ap Portable    Result Date: 10/22/2019  EXAMINATION: XR CHEST AP PORTABLE CLINICAL HISTORY: CHF; COMPARISON: 10/20/2019.  FINDINGS: The heart is enlarged but stable.  Prominence of the central pulmonary vasculature remains unchanged.  Patchy ground-glass type opacities persist within the mid lung zones bilaterally with more focal airspace opacities or volume loss noted in the right lower lung zone, similar to the prior study.  Blunting of the right costophrenic angle is slightly greater on the current examination in compatible with small right-sided effusion. A prosthetic aortic valve is again noted.  There is arteriosclerosis and tortuosity of the aorta.  The bones are demineralized.     Cardiomegaly and central pulmonary vascular prominence. Bilateral airspace opacities, right greater than left, similar to the prior exam. Small right-sided pleural effusion. Electronically signed by: Camacho Arce IV., MD Date:    10/22/2019 Time:    17:55    X-ray Chest Ap Portable    Result Date: 10/20/2019  EXAMINATION: XR CHEST AP PORTABLE CLINICAL HISTORY: persisting cough, hypoxemia; COMPARISON: 10/18/2019 FINDINGS: Moderate cardiopericardial silhouette enlargement is stable from prior.  Atherosclerotic calcification of the aorta.  Central pulmonary vascularity is stable.  Prosthetic cardiac valve. Confluent opacity at the right lung base could reflect pleural fluid, airspace disease, or some combination thereof - this appears similar to prior.  Pleural fluid tracks into the right minor fissure.  There is also focal airspace opacity of the left mid lung which appears new from prior.  No pneumothorax.  No acute osseous abnormality.     Confluent opacity at the right lung base potentially reflecting pleural fluid, airspace disease, or some combination thereof. New focal left mid lung alveolar opacity, concerning for pneumonia. Stable cardiomegaly. Electronically signed by: Harish Liu MD Date:    10/20/2019 Time:    08:55    EKG, personally reviewed: Atrial fibrillation, rate 64, no ST elevation

## 2019-10-24 PROBLEM — T82.9XXA COMPLICATION OF AV DIALYSIS FISTULA: Status: ACTIVE | Noted: 2019-10-24

## 2019-10-24 LAB
ANION GAP SERPL CALC-SCNC: 19 MMOL/L (ref 8–16)
BASOPHILS # BLD AUTO: 0.02 K/UL (ref 0–0.2)
BASOPHILS NFR BLD: 0.5 % (ref 0–1.9)
BUN SERPL-MCNC: 24 MG/DL (ref 8–23)
CALCIUM SERPL-MCNC: 7.8 MG/DL (ref 8.7–10.5)
CHLORIDE SERPL-SCNC: 94 MMOL/L (ref 95–110)
CO2 SERPL-SCNC: 24 MMOL/L (ref 23–29)
CREAT SERPL-MCNC: 5.1 MG/DL (ref 0.5–1.4)
DIFFERENTIAL METHOD: ABNORMAL
EOSINOPHIL # BLD AUTO: 0 K/UL (ref 0–0.5)
EOSINOPHIL NFR BLD: 0.7 % (ref 0–8)
ERYTHROCYTE [DISTWIDTH] IN BLOOD BY AUTOMATED COUNT: 19.6 % (ref 11.5–14.5)
EST. GFR  (AFRICAN AMERICAN): 9 ML/MIN/1.73 M^2
EST. GFR  (NON AFRICAN AMERICAN): 7.8 ML/MIN/1.73 M^2
GLUCOSE SERPL-MCNC: 123 MG/DL (ref 70–110)
GLUCOSE SERPL-MCNC: 131 MG/DL (ref 70–110)
GLUCOSE SERPL-MCNC: 75 MG/DL (ref 70–110)
GLUCOSE SERPL-MCNC: 85 MG/DL (ref 70–110)
GLUCOSE SERPL-MCNC: 94 MG/DL (ref 70–110)
HCT VFR BLD AUTO: 39.6 % (ref 37–48.5)
HGB BLD-MCNC: 12.7 G/DL (ref 12–16)
IMM GRANULOCYTES # BLD AUTO: 0.02 K/UL (ref 0–0.04)
IMM GRANULOCYTES NFR BLD AUTO: 0.5 % (ref 0–0.5)
LYMPHOCYTES # BLD AUTO: 0.7 K/UL (ref 1–4.8)
LYMPHOCYTES NFR BLD: 16.1 % (ref 18–48)
MAGNESIUM SERPL-MCNC: 2.1 MG/DL (ref 1.6–2.6)
MCH RBC QN AUTO: 29.5 PG (ref 27–31)
MCHC RBC AUTO-ENTMCNC: 32.1 G/DL (ref 32–36)
MCV RBC AUTO: 92 FL (ref 82–98)
MONOCYTES # BLD AUTO: 0.5 K/UL (ref 0.3–1)
MONOCYTES NFR BLD: 11.8 % (ref 4–15)
NEUTROPHILS # BLD AUTO: 3.1 K/UL (ref 1.8–7.7)
NEUTROPHILS NFR BLD: 70.4 % (ref 38–73)
NRBC BLD-RTO: 0 /100 WBC
PHOSPHATE SERPL-MCNC: 3.1 MG/DL (ref 2.7–4.5)
PLATELET # BLD AUTO: 138 K/UL (ref 150–350)
PMV BLD AUTO: 9.8 FL (ref 9.2–12.9)
POTASSIUM SERPL-SCNC: 4.1 MMOL/L (ref 3.5–5.1)
RBC # BLD AUTO: 4.31 M/UL (ref 4–5.4)
SODIUM SERPL-SCNC: 137 MMOL/L (ref 136–145)
WBC # BLD AUTO: 4.4 K/UL (ref 3.9–12.7)

## 2019-10-24 PROCEDURE — 85025 COMPLETE CBC W/AUTO DIFF WBC: CPT

## 2019-10-24 PROCEDURE — 83735 ASSAY OF MAGNESIUM: CPT | Mod: 91

## 2019-10-24 PROCEDURE — 25000003 PHARM REV CODE 250: Mod: GZ | Performed by: NURSE PRACTITIONER

## 2019-10-24 PROCEDURE — 25500020 PHARM REV CODE 255: Performed by: INTERNAL MEDICINE

## 2019-10-24 PROCEDURE — 96376 TX/PRO/DX INJ SAME DRUG ADON: CPT

## 2019-10-24 PROCEDURE — 36415 COLL VENOUS BLD VENIPUNCTURE: CPT

## 2019-10-24 PROCEDURE — G0378 HOSPITAL OBSERVATION PER HR: HCPCS

## 2019-10-24 PROCEDURE — 84100 ASSAY OF PHOSPHORUS: CPT

## 2019-10-24 PROCEDURE — 94761 N-INVAS EAR/PLS OXIMETRY MLT: CPT

## 2019-10-24 PROCEDURE — 99900035 HC TECH TIME PER 15 MIN (STAT)

## 2019-10-24 PROCEDURE — 80048 BASIC METABOLIC PNL TOTAL CA: CPT

## 2019-10-24 PROCEDURE — 82962 GLUCOSE BLOOD TEST: CPT | Mod: 91

## 2019-10-24 RX ORDER — IODIXANOL 320 MG/ML
100 INJECTION, SOLUTION INTRAVASCULAR
Status: COMPLETED | OUTPATIENT
Start: 2019-10-24 | End: 2019-10-24

## 2019-10-24 RX ADMIN — RENAGEL 800 MG: 800 TABLET ORAL at 11:10

## 2019-10-24 RX ADMIN — LOSARTAN POTASSIUM 25 MG: 25 TABLET, FILM COATED ORAL at 09:10

## 2019-10-24 RX ADMIN — ISOSORBIDE MONONITRATE 30 MG: 30 TABLET, EXTENDED RELEASE ORAL at 09:10

## 2019-10-24 RX ADMIN — RENAGEL 800 MG: 800 TABLET ORAL at 09:10

## 2019-10-24 RX ADMIN — IODIXANOL 100 ML: 320 INJECTION, SOLUTION INTRAVASCULAR at 08:10

## 2019-10-24 RX ADMIN — RENAGEL 800 MG: 800 TABLET ORAL at 05:10

## 2019-10-24 RX ADMIN — METOPROLOL SUCCINATE 50 MG: 50 TABLET, EXTENDED RELEASE ORAL at 09:10

## 2019-10-24 RX ADMIN — MAGNESIUM OXIDE 400 MG: 400 TABLET ORAL at 09:10

## 2019-10-24 RX ADMIN — CALCIUM ACETATE 1334 MG: 667 CAPSULE ORAL at 09:10

## 2019-10-24 RX ADMIN — FLUTICASONE FUROATE AND VILANTEROL TRIFENATATE 1 PUFF: 100; 25 POWDER RESPIRATORY (INHALATION) at 08:10

## 2019-10-24 RX ADMIN — DILTIAZEM HYDROCHLORIDE 180 MG: 180 CAPSULE, COATED, EXTENDED RELEASE ORAL at 09:10

## 2019-10-24 RX ADMIN — CALCIUM ACETATE 1334 MG: 667 CAPSULE ORAL at 05:10

## 2019-10-24 RX ADMIN — CALCIUM ACETATE 1334 MG: 667 CAPSULE ORAL at 11:10

## 2019-10-24 NOTE — PROGRESS NOTES
[facility]  Nephrology  Progress Note    Patient Name: Griselda Neely  MRN: [id number]  Admission Date: 10/22/2019  Hospital Length of Stay: 0 days  Attending Provider: Hilary La MD   Primary Care Physician: Kalie Neely MD  Principal Problem:Chest pain      Subjective:     Interval History: sleeping soundly this am; no obvious distress appreciated; s/p HD on 10/23/19 (net UF: approx: 2L)    Review of patient's allergies indicates:  No Known Allergies  Current Facility-Administered Medications   Medication Frequency    aspirin chewable tablet 81 mg Daily    calcium acetate capsule 1,334 mg TID WM    dextrose 50% injection 12.5 g PRN    dextrose 50% injection 25 g PRN    diltiaZEM 24 hr capsule 180 mg Daily    enoxaparin injection 60 mg Q24H    fluticasone furoate-vilanterol 100-25 mcg/dose diskus inhaler 1 puff Daily    glucagon (human recombinant) injection 1 mg PRN    glucose chewable tablet 16 g PRN    glucose chewable tablet 24 g PRN    insulin aspart U-100 pen 0-5 Units QID (AC + HS) PRN    isosorbide mononitrate 24 hr tablet 30 mg Daily    levETIRAcetam tablet 500 mg Every Mon, Wed, Fri    losartan tablet 25 mg Daily    magnesium oxide tablet 400 mg Daily    metoprolol succinate (TOPROL-XL) 24 hr tablet 50 mg Daily    mupirocin 2 % ointment BID    nitroGLYCERIN SL tablet 0.4 mg Q5 Min PRN    ondansetron disintegrating tablet 4 mg Q6H PRN    sevelamer HCl tablet 800 mg TID WM    sodium chloride 0.9% flush 10 mL PRN    sodium thiosulfate 12.5 g IVPB Every Mon, Wed, Fri    traMADol tablet 50 mg Once per day on Mon Wed Fri       Objective:     Vital Signs (Most Recent):  Temp: 98.6 °F (37 °C) (10/24/19 0321)  Pulse: 106 (10/24/19 0321)  Resp: 16 (10/24/19 0321)  BP: (!) 180/83 (10/24/19 0321)  SpO2: 96 % (10/24/19 0321)  O2 Device (Oxygen Therapy): nasal cannula (10/24/19 0500) Vital Signs (24h Range):  Temp:  [97.7 °F (36.5 °C)-98.7 °F (37.1 °C)] 98.6 °F (37  °C)  Pulse:  [] 106  Resp:  [16-24] 16  SpO2:  [96 %-100 %] 96 %  BP: (139-180)/(58-90) 180/83     Weight: 55.1 kg (121 lb 7.6 oz) (10/24/19 0500)  Body mass index is 20.21 kg/m².  Body surface area is 1.59 meters squared.    I/O last 3 completed shifts:  In: 840 [P.O.:240; I.V.:100; Other:500]  Out: 2600 [Other:2600]    Physical Exam   Constitutional: No distress.   Frail, chronically ill-appearing   HENT:   Head: Normocephalic and atraumatic.   Mouth/Throat: Oropharynx is clear and moist.   B temporal wasting     Eyes: Right eye exhibits no discharge. Left eye exhibits no discharge. No scleral icterus.   B conjunctival injection (mild)   Neck: Normal range of motion. Neck supple. No JVD present.   Cardiovascular: Normal rate, regular rhythm and intact distal pulses. Exam reveals no gallop and no friction rub.   Murmur heard.  Pulmonary/Chest: Effort normal. No respiratory distress. She has wheezes. She exhibits no tenderness.   Coarse, diffuse BS B w/ inspiratory rhonchi & occasional, end-expiratory wheezes over R base   Abdominal: Soft. She exhibits no distension and no mass. There is no tenderness.   Hypoactive BS in all quadrants   Genitourinary:   Genitourinary Comments: Deferred   Musculoskeletal: Normal range of motion. She exhibits no edema or tenderness.   R UE AV Fistula w/ good thrill   Neurological: No cranial nerve deficit.   Unable to assess   Skin: Skin is warm and dry. She is not diaphoretic. No erythema.   Psychiatric:   Unable to assess   Nursing note and vitals reviewed.      Significant Labs:sure  ABGs:   Recent Labs   Lab 10/18/19  1147   PH 7.251*   PCO2 36.0   HCO3 15.8*   POCSATURATED 99   BE -11     Cardiac Markers: No results for input(s): CKMB, TROPONINT, MYOGLOBIN in the last 168 hours.  CBC:   Recent Labs   Lab 10/24/19  0614   WBC 4.40   RBC 4.31   HGB 12.7   HCT 39.6   *   MCV 92   MCH 29.5   MCHC 32.1     CMP:   Recent Labs   Lab 10/22/19  1800  10/24/19  0614   GLU 99    < > 85   CALCIUM 7.8*   < > 7.8*   ALBUMIN 2.9*  --   --    PROT 6.3  --   --       < > 137   K 4.5   < > 4.1   CO2 24   < > 24   CL 98   < > 94*   BUN 36*   < > 24*   CREATININE 5.6*   < > 5.1*   ALKPHOS 219*  --   --    *  --   --    AST 41*  --   --    BILITOT 1.3*  --   --     < > = values in this interval not displayed.       Significant Imaging:  X-Ray: Reviewed    Assessment/Plan:     Active Diagnoses:    Diagnosis Date Noted POA    PRINCIPAL PROBLEM:  Chest pain [R07.9] 10/18/2019 Yes    Shortness of breath [R06.02] 10/22/2019 Yes    ESRD (end stage renal disease) on HD M,W, F [N18.6] 05/11/2016 Yes    DM (diabetes mellitus) [E11.9] 08/15/2013 Yes    HTN (hypertension) [I10] 08/15/2013 Yes      Problems Resolved During this Admission:     1. ESRD (HD-MWF via R UE AV Fistula)- clinically stable at present; no overt volume overload, uremic signs/symptoms, electrolyte perturbations nor acid-base disturbance; no active issues    -maintenance HD (duration: 3h; UF Goal: 2-3L as tolerated;  Standard 'bath'; Access: AVF; Qb: 400 ml/min, Qd: 2x BFR; 12.5 gm of Na thiosulfate over last 30-60 min of treatment)) per outpatient schedule    -DAILY renal function panel to document sCr trend, optimize electrolyte 'bath' & assess response to therapy    -avoid nephrotoxic agents (NSAIDs, IV contrast dye, MRI w/ Gadolinium constrast)    -renally dose all appropriate medications, including antibiotics     2. HTN- clinically stable at present; BP NOT AT GOAL (180/83); no active issues    -continue current anti-HTN drug regimen (GIVE BP MEDS POST HD on HD Days) + HD-associated UF to achieve & maintain goal BP (< 150/90)     3. ANEMIA- clinically stable at present; H/H  AT GOAL (12.7/39.6); no active issues    -trend DAILY CBC (H/H) to effect optimal blood-loss surveillance     4. SECONDARY HYPERPARATHYROIDISM (sHPT)-- clinically stable on current therapy (Na thiosulfate 12.5 gm x last 30-60 min of HD); no active  issues    -continue dietary binder/Vitamin D +/- HD-associated calcimimetic therapy (Cinacalcet vs Etelcalcetide)     5. CHRONIC RESPIRATORY FAILURE w/ Acute Exacerbation- symptomatically improved on scheduled bronchodilator/IV steroid/supplemental O2 therapy; no active issues    -management per Primary team +/- Pulmonary Medicine service recommendations    Thank you for your consult. I will follow-up with patient. Please contact us if you have any additional questions.    Dane Martinez III, MD  [facility]  [facility]  [phone] (C)

## 2019-10-24 NOTE — ASSESSMENT & PLAN NOTE
Likely multifactorial: smoker- unwilling to quit, pulmonary hypertension, COPD, and volume overload   PE cannot be excluded per CTA chest.  Repeating CTA chest in the AM for better study.  Therapeutic lovenox to cover PE treatment in the interim  Consult renal for HD for volume overload- had HD today  O2 supplement as needed

## 2019-10-24 NOTE — NURSING
Pt dialysis fistula noted to be bleeding, Dr. La making rounds and made aware, MD at bed side to assess. Pressure drsg applied per Chery HANSEN charge nurse, Dr. Ingram called and message left with office. NAD, safety maintained, call light in reach will cont to moniter.

## 2019-10-24 NOTE — PROGRESS NOTES
Formerly [facility] Medicine  Progress Note    Patient Name: Griselda Neely  MRN: [id number]  Patient Class: OP- Observation   Admission Date: 10/22/2019  Length of Stay: 0 days  Attending Physician: Hilary La MD  Primary Care Provider: Kalie Neely MD        Subjective:     Principal Problem:Chest pain        HPI:  The patient is a 74-year-old  female with history of AFib, congestive heart failures, end-stage renal disease on HD Monday Wednesday and Friday, COPD and continued to smoke, severe peripheral vascular disease, calciphylaxis, history of blood transfusion, gout, hypertension, and hyperlipidemia. She presented to the ED with chest pain and severe shortness of breath. She was discharged home earlier today. She was admitted here on 10/18/2019 for acute on chronic respiratory failure thought to be secondary to AFib with RVR. She initially received Cardizem loading dose then placed on Cardizem drip.  Cardiology was consulted with recommendations to resume oral Cardizem.   Her troponin levels were elevated,  thought to be secondary to demand ischemia with no plans for further workup.  Shortness of breath also thought to be secondary to volume overload. Nephrology was consulted for dialysis.      She states that she got home around 1 PM today. She has intermittent chest pain but cannot describe her pain, duration, its location, radiation, associated symptoms, aggravating or alleviating factors. Currently she denies pain. She also reports severe shortness of breath associated with activities. She cannot state what she was doing at the time of her shortness of breath. She denies fever or chills. She reports nonproductive cough. She denies abdominal pain but reports nausea and vomiting. She states that she vomited 3 times at home. Currently, she denies shortness of breath. Oxygen saturation is 98 -99% on 2 L nasal cannula.      CTA chest in the ED was inconclusive for  PE. She received Lovenox 60 mg subq in the ED.    Overview/Hospital Course:  Patient just discharged after evaluation for shortness of breath and chest pain.  She came back the same evening with return of same symptoms.  CTA of the chest done and poor study and thus cannot say if she has or does not have PE.  Started on renal dose anticoagulation. Repeat CTA chest done 10/24 and better study with no convincing evidence of PE.  Lovenox stopped.  Cardiology consulted and evaluated the patient and believes euvolemic at this time and not ACS. Slight elevation in troponin but static and less than prior admission.  Renal consulted for routine HD.  Had acute bleeding from her right upper extremity fistula 10/24.  Pressure dressing applied and bleeding has appeared to have stopped.    Interval History: Chest pain POA- location is chest, persistent, moderately severe, associated with SOB.  Denies chest pain or SOB today.  Has new bleeding from her RUE fistula.     Review of Systems   Constitutional: Negative.    HENT: Negative.    Eyes: Negative.    Respiratory: Negative.    Cardiovascular: Negative.    Gastrointestinal: Negative.    Endocrine: Negative.    Genitourinary: Negative.    Musculoskeletal: Negative.         Bleeding from her right upper extremity fistula   Skin: Negative.    Allergic/Immunologic: Negative.    Neurological: Negative.    Hematological: Negative.    Psychiatric/Behavioral: Negative.      Objective:     Vital Signs (Most Recent):  Temp: 97.8 °F (36.6 °C) (10/24/19 1157)  Pulse: 86 (10/24/19 1300)  Resp: (!) 22 (10/24/19 1157)  BP: (!) 139/93 (10/24/19 1157)  SpO2: 100 % (10/24/19 1157) Vital Signs (24h Range):  Temp:  [97.8 °F (36.6 °C)-98.7 °F (37.1 °C)] 97.8 °F (36.6 °C)  Pulse:  [] 86  Resp:  [16-22] 22  SpO2:  [96 %-100 %] 100 %  BP: (139-180)/(83-97) 139/93     Weight: 55.1 kg (121 lb 7.6 oz)  Body mass index is 20.21 kg/m².    Intake/Output Summary (Last 24 hours) at 10/24/2019 1640  Last  data filed at 10/24/2019 0600  Gross per 24 hour   Intake 740 ml   Output 2600 ml   Net -1860 ml      Physical Exam   Constitutional: She is oriented to person, place, and time. She appears well-developed. No distress.   HENT:   Head: Normocephalic and atraumatic.   Mouth/Throat: Oropharynx is clear and moist.   Eyes: Pupils are equal, round, and reactive to light. EOM are normal.   Neck: Normal range of motion.   Cardiovascular:   No murmur heard.  IRR   Pulmonary/Chest: Effort normal and breath sounds normal. She has no wheezes.   Abdominal: Soft. She exhibits no distension. There is no tenderness. There is no rebound and no guarding.   Musculoskeletal: Normal range of motion.   Bright red blood bleeding from RUE fistula   Neurological: She is alert and oriented to person, place, and time. No cranial nerve deficit or sensory deficit.   Skin: No rash noted.   Chronic LE skin wounds from calciphylaxis     Psychiatric: She has a normal mood and affect.   Nursing note and vitals reviewed.      Significant Labs:   CBC:   Recent Labs   Lab 10/22/19  1800 10/23/19  0559 10/24/19  0614   WBC 4.65 4.91 4.40   HGB 11.6* 12.2 12.7   HCT 36.9* 37.3 39.6   * 168 138*     CMP:   Recent Labs   Lab 10/22/19  1800 10/23/19  0559 10/24/19  0614    137 137   K 4.5 4.3 4.1   CL 98 96 94*   CO2 24 25 24   GLU 99 79 85   BUN 36* 42* 24*   CREATININE 5.6* 6.5* 5.1*   CALCIUM 7.8* 7.7* 7.8*   PROT 6.3  --   --    ALBUMIN 2.9*  --   --    BILITOT 1.3*  --   --    ALKPHOS 219*  --   --    AST 41*  --   --    *  --   --    ANIONGAP 15 16 19*   EGFRNONAA 7.0* 5.8* 7.8*       Significant Imaging: Tele personally reviewed:  afib      Assessment/Plan:      * Chest pain  With chronically elevated troponin, likely due to decrease clearance from ESRD  Trended troponin and static  Cardiology evaluated the patient and do not think ACS  Denies current chest pain  Tele monitoring       Complication of AV dialysis fistula,  bleeding  New.  Pressure bandage applied with hemostasis finally achieved.  Will monitor overnight to ensure no rebleed.  AM labs ordered to check cell counts.      Shortness of breath  Likely multifactorial: smoker- unwilling to quit, pulmonary hypertension, COPD, and volume overload   PE cannot be excluded per admitting CTA chest.  Repeated CTA chest and no obvious PE.  Lovenox stopped.  Routine HD per renal  O2 supplement as needed  Currently euvolemic on exam    ESRD (end stage renal disease) on HD M,W, F  Renal diet  Routine HD per renal.      DM (diabetes mellitus)  Monitor blood glucose  SSI      HTN (hypertension)  Monitor  Resume home medication        VTE Risk Mitigation (From admission, onward)         Ordered     Place sequential compression device  Until discontinued      10/22/19 2016     IP VTE HIGH RISK PATIENT  Once      10/22/19 2016                      Hilary La MD  Department of Hospital Medicine   [facility]

## 2019-10-24 NOTE — NURSING
Dialysis fistula reassessed, no bleeding noted at this time, pressure drsg CDI. NAD, safety maintained, call light in reach, family at bedside, will cont to moniter.

## 2019-10-24 NOTE — SUBJECTIVE & OBJECTIVE
Interval History: Chest pain POA- location is chest, persistent, moderately severe, associated with SOB.  Denies chest pain or SOB today.  Has new bleeding from her RUE fistula.     Review of Systems   Constitutional: Negative.    HENT: Negative.    Eyes: Negative.    Respiratory: Negative.    Cardiovascular: Negative.    Gastrointestinal: Negative.    Endocrine: Negative.    Genitourinary: Negative.    Musculoskeletal: Negative.         Bleeding from her right upper extremity fistula   Skin: Negative.    Allergic/Immunologic: Negative.    Neurological: Negative.    Hematological: Negative.    Psychiatric/Behavioral: Negative.      Objective:     Vital Signs (Most Recent):  Temp: 97.8 °F (36.6 °C) (10/24/19 1157)  Pulse: 86 (10/24/19 1300)  Resp: (!) 22 (10/24/19 1157)  BP: (!) 139/93 (10/24/19 1157)  SpO2: 100 % (10/24/19 1157) Vital Signs (24h Range):  Temp:  [97.8 °F (36.6 °C)-98.7 °F (37.1 °C)] 97.8 °F (36.6 °C)  Pulse:  [] 86  Resp:  [16-22] 22  SpO2:  [96 %-100 %] 100 %  BP: (139-180)/(83-97) 139/93     Weight: 55.1 kg (121 lb 7.6 oz)  Body mass index is 20.21 kg/m².    Intake/Output Summary (Last 24 hours) at 10/24/2019 1640  Last data filed at 10/24/2019 0600  Gross per 24 hour   Intake 740 ml   Output 2600 ml   Net -1860 ml      Physical Exam   Constitutional: She is oriented to person, place, and time. She appears well-developed. No distress.   HENT:   Head: Normocephalic and atraumatic.   Mouth/Throat: Oropharynx is clear and moist.   Eyes: Pupils are equal, round, and reactive to light. EOM are normal.   Neck: Normal range of motion.   Cardiovascular:   No murmur heard.  IRR   Pulmonary/Chest: Effort normal and breath sounds normal. She has no wheezes.   Abdominal: Soft. She exhibits no distension. There is no tenderness. There is no rebound and no guarding.   Musculoskeletal: Normal range of motion.   Bright red blood bleeding from RUE fistula   Neurological: She is alert and oriented to person,  place, and time. No cranial nerve deficit or sensory deficit.   Skin: No rash noted.   Chronic LE skin wounds from calciphylaxis     Psychiatric: She has a normal mood and affect.   Nursing note and vitals reviewed.      Significant Labs:   CBC:   Recent Labs   Lab 10/22/19  1800 10/23/19  0559 10/24/19  0614   WBC 4.65 4.91 4.40   HGB 11.6* 12.2 12.7   HCT 36.9* 37.3 39.6   * 168 138*     CMP:   Recent Labs   Lab 10/22/19  1800 10/23/19  0559 10/24/19  0614    137 137   K 4.5 4.3 4.1   CL 98 96 94*   CO2 24 25 24   GLU 99 79 85   BUN 36* 42* 24*   CREATININE 5.6* 6.5* 5.1*   CALCIUM 7.8* 7.7* 7.8*   PROT 6.3  --   --    ALBUMIN 2.9*  --   --    BILITOT 1.3*  --   --    ALKPHOS 219*  --   --    AST 41*  --   --    *  --   --    ANIONGAP 15 16 19*   EGFRNONAA 7.0* 5.8* 7.8*       Significant Imaging: Tele personally reviewed:  shae

## 2019-10-24 NOTE — PROGRESS NOTES
Former[facility] Medicine  Progress Note    Patient Name: Griselda Neely  MRN: [id number]  Patient Class: OP- Observation   Admission Date: 10/22/2019  Length of Stay: 0 days  Attending Physician: Hilary La MD  Primary Care Provider: Kalie Neely MD        Subjective:     Principal Problem:Chest pain        HPI:  The patient is a 74-year-old  female with history of AFib, congestive heart failures, end-stage renal disease on HD Monday Wednesday and Friday, COPD and continued to smoke, severe peripheral vascular disease, calciphylaxis, history of blood transfusion, gout, hypertension, and hyperlipidemia. She presented to the ED with chest pain and severe shortness of breath. She was discharged home earlier today. She was admitted here on 10/18/2019 for acute on chronic respiratory failure thought to be secondary to AFib with RVR. She initially received Cardizem loading dose then placed on Cardizem drip.  Cardiology was consulted with recommendations to resume oral Cardizem.   Her troponin levels were elevated,  thought to be secondary to demand ischemia with no plans for further workup.  Shortness of breath also thought to be secondary to volume overload. Nephrology was consulted for dialysis.      She states that she got home around 1 PM today. She has intermittent chest pain but cannot describe her pain, duration, its location, radiation, associated symptoms, aggravating or alleviating factors. Currently she denies pain. She also reports severe shortness of breath associated with activities. She cannot state what she was doing at the time of her shortness of breath. She denies fever or chills. She reports nonproductive cough. She denies abdominal pain but reports nausea and vomiting. She states that she vomited 3 times at home. Currently, she denies shortness of breath. Oxygen saturation is 98 -99% on 2 L nasal cannula.      CTA chest in the ED was inconclusive for  PE. She received Lovenox 60 mg subq in the ED.    Overview/Hospital Course:  Patient just discharged after evaluation for shortness of breath and chest pain.  She came back the same evening with return of same symptoms.  CTA of the chest done and poor study and thus cannot say if she has or does not have PE.  Started on renal dose anticoagulation. Cardiology consulted. Slight elevation in troponin but static and less than prior admission.  Renal consulted for routine HD.      Interval History: Chest pain POA- location is chest, persistent, moderately severe, associated with SOB. Denies SOB and chest pain to me currently.  Really used 1 or 2 word replies for all of my questions.    Review of Systems   Constitutional: Negative.    HENT: Negative.    Eyes: Negative.    Respiratory: Positive for chest tightness and shortness of breath.    Cardiovascular: Negative.    Gastrointestinal: Negative.    Endocrine: Negative.    Genitourinary: Negative.    Musculoskeletal: Negative.    Skin: Negative.    Allergic/Immunologic: Negative.    Neurological: Negative.    Hematological: Negative.    Psychiatric/Behavioral: Negative.      Objective:     Vital Signs (Most Recent):  Temp: 98.3 °F (36.8 °C) (10/23/19 1345)  Pulse: 98 (10/23/19 1630)  Resp: 18 (10/23/19 1345)  BP: (!) 139/58 (10/23/19 1630)  SpO2: 99 % (10/23/19 1124) Vital Signs (24h Range):  Temp:  [97.7 °F (36.5 °C)-98.4 °F (36.9 °C)] 98.3 °F (36.8 °C)  Pulse:  [59-98] 98  Resp:  [18-26] 18  SpO2:  [94 %-100 %] 99 %  BP: (139-163)/(58-90) 139/58     Weight: 56.9 kg (125 lb 7.1 oz)  Body mass index is 20.87 kg/m².    Intake/Output Summary (Last 24 hours) at 10/23/2019 1933  Last data filed at 10/23/2019 1700  Gross per 24 hour   Intake 500 ml   Output 2600 ml   Net -2100 ml      Physical Exam   Constitutional: She is oriented to person, place, and time. She appears well-developed. No distress.   Seen while on HD   HENT:   Head: Normocephalic and atraumatic.    Mouth/Throat: Oropharynx is clear and moist.   Eyes: Pupils are equal, round, and reactive to light. EOM are normal.   Neck: Normal range of motion.   Cardiovascular: Regular rhythm.   No murmur heard.  Pulmonary/Chest: Effort normal and breath sounds normal. She has no wheezes.   Abdominal: Soft. She exhibits no distension. There is no tenderness. There is no rebound and no guarding.   Musculoskeletal: Normal range of motion.   Neurological: She is alert and oriented to person, place, and time. No cranial nerve deficit or sensory deficit.   Reported double vision to the RN earlier, denies double vision to me during my exam.    Skin: No rash noted.   Psychiatric: She has a normal mood and affect.   Nursing note and vitals reviewed.      Significant Labs:   CBC:   Recent Labs   Lab 10/22/19  0517 10/22/19  1800 10/23/19  0559   WBC 4.73  4.73 4.65 4.91   HGB 12.5  12.5 11.6* 12.2   HCT 38.9  38.9 36.9* 37.3   *  145* 143* 168     CMP:   Recent Labs   Lab 10/22/19  0517 10/22/19  1800 10/23/19  0559   *  135* 137 137   K 4.6  4.6 4.5 4.3   CL 95  95 98 96   CO2 23  23 24 25   GLU 75  75 99 79   BUN 32*  32* 36* 42*   CREATININE 4.8*  4.8* 5.6* 6.5*   CALCIUM 7.6*  7.6* 7.8* 7.7*   PROT 6.6  6.6 6.3  --    ALBUMIN 2.8*  2.8* 2.9*  --    BILITOT 1.5*  1.5* 1.3*  --    ALKPHOS 234*  234* 219*  --    AST 51*  51* 41*  --    *  122* 107*  --    ANIONGAP 17*  17* 15 16   EGFRNONAA 8.4*  8.4* 7.0* 5.8*       Significant Imaging: Tele reviewed: NSR      Assessment/Plan:      * Chest pain  With chronically elevated troponin, likely due to decrease clearance from ESRD  Trended troponin and static  Consult cardiology- evaluation pending  Denies current chest pain  Tele monitoring       Shortness of breath  Likely multifactorial: smoker- unwilling to quit, pulmonary hypertension, COPD, and volume overload   PE cannot be excluded per CTA chest.  Repeating CTA chest in the AM for better  study.  Therapeutic lovenox to cover PE treatment in the interim  Consult renal for HD for volume overload- had HD today  O2 supplement as needed    ESRD (end stage renal disease) on HD M,W, F  Renal diet  Routine HD per renal.      DM (diabetes mellitus)  Monitor blood glucose  SSI      HTN (hypertension)  Monitor  Resume home medication        VTE Risk Mitigation (From admission, onward)         Ordered     enoxaparin injection 60 mg  Every 24 hours (non-standard times)      10/22/19 2019     Place sequential compression device  Until discontinued      10/22/19 2016     IP VTE HIGH RISK PATIENT  Once      10/22/19 2016                      Hilary La MD  Department of Hospital Medicine   [facility]

## 2019-10-24 NOTE — ASSESSMENT & PLAN NOTE
New.  Pressure bandage applied with hemostasis finally achieved.  Will monitor overnight to ensure no rebleed.  AM labs ordered to check cell counts.

## 2019-10-24 NOTE — SUBJECTIVE & OBJECTIVE
Interval History: Chest pain POA- location is chest, persistent, moderately severe, associated with SOB. Denies SOB and chest pain to me currently.  Really used 1 or 2 word replies for all of my questions.    Review of Systems   Constitutional: Negative.    HENT: Negative.    Eyes: Negative.    Respiratory: Positive for chest tightness and shortness of breath.    Cardiovascular: Negative.    Gastrointestinal: Negative.    Endocrine: Negative.    Genitourinary: Negative.    Musculoskeletal: Negative.    Skin: Negative.    Allergic/Immunologic: Negative.    Neurological: Negative.    Hematological: Negative.    Psychiatric/Behavioral: Negative.      Objective:     Vital Signs (Most Recent):  Temp: 98.3 °F (36.8 °C) (10/23/19 1345)  Pulse: 98 (10/23/19 1630)  Resp: 18 (10/23/19 1345)  BP: (!) 139/58 (10/23/19 1630)  SpO2: 99 % (10/23/19 1124) Vital Signs (24h Range):  Temp:  [97.7 °F (36.5 °C)-98.4 °F (36.9 °C)] 98.3 °F (36.8 °C)  Pulse:  [59-98] 98  Resp:  [18-26] 18  SpO2:  [94 %-100 %] 99 %  BP: (139-163)/(58-90) 139/58     Weight: 56.9 kg (125 lb 7.1 oz)  Body mass index is 20.87 kg/m².    Intake/Output Summary (Last 24 hours) at 10/23/2019 1933  Last data filed at 10/23/2019 1700  Gross per 24 hour   Intake 500 ml   Output 2600 ml   Net -2100 ml      Physical Exam   Constitutional: She is oriented to person, place, and time. She appears well-developed. No distress.   Seen while on HD   HENT:   Head: Normocephalic and atraumatic.   Mouth/Throat: Oropharynx is clear and moist.   Eyes: Pupils are equal, round, and reactive to light. EOM are normal.   Neck: Normal range of motion.   Cardiovascular: Regular rhythm.   No murmur heard.  Pulmonary/Chest: Effort normal and breath sounds normal. She has no wheezes.   Abdominal: Soft. She exhibits no distension. There is no tenderness. There is no rebound and no guarding.   Musculoskeletal: Normal range of motion.   Neurological: She is alert and oriented to person, place,  and time. No cranial nerve deficit or sensory deficit.   Reported double vision to the RN earlier, denies double vision to me during my exam.    Skin: No rash noted.   Psychiatric: She has a normal mood and affect.   Nursing note and vitals reviewed.      Significant Labs:   CBC:   Recent Labs   Lab 10/22/19  0517 10/22/19  1800 10/23/19  0559   WBC 4.73  4.73 4.65 4.91   HGB 12.5  12.5 11.6* 12.2   HCT 38.9  38.9 36.9* 37.3   *  145* 143* 168     CMP:   Recent Labs   Lab 10/22/19  0517 10/22/19  1800 10/23/19  0559   *  135* 137 137   K 4.6  4.6 4.5 4.3   CL 95  95 98 96   CO2 23  23 24 25   GLU 75  75 99 79   BUN 32*  32* 36* 42*   CREATININE 4.8*  4.8* 5.6* 6.5*   CALCIUM 7.6*  7.6* 7.8* 7.7*   PROT 6.6  6.6 6.3  --    ALBUMIN 2.8*  2.8* 2.9*  --    BILITOT 1.5*  1.5* 1.3*  --    ALKPHOS 234*  234* 219*  --    AST 51*  51* 41*  --    *  122* 107*  --    ANIONGAP 17*  17* 15 16   EGFRNONAA 8.4*  8.4* 7.0* 5.8*       Significant Imaging: Tele reviewed: NSR

## 2019-10-24 NOTE — HOSPITAL COURSE
Patient just discharged after evaluation for shortness of breath and chest pain.  She came back the same evening with return of same symptoms.  CTA of the chest done and poor study and thus cannot say if she has or does not have PE.  Started on renal dose anticoagulation. Repeat CTA chest done 10/24 and better study with no convincing evidence of PE.  Lovenox stopped.  Cardiology consulted and evaluated the patient and believes euvolemic at this time and not ACS. Slight elevation in troponin but static and less than prior admission.  Renal consulted for routine HD.  Had acute bleeding from her right upper extremity fistula 10/24.  Pressure dressing applied and bleeding has stopped.   Patient cleared for discharge and will follow up with PCP and cardiology.  She wants to home home.  Return precautions given.

## 2019-10-24 NOTE — ASSESSMENT & PLAN NOTE
With chronically elevated troponin, likely due to decrease clearance from ESRD  Trended troponin and static  Consult cardiology- evaluation pending  Denies current chest pain  Tele monitoring

## 2019-10-24 NOTE — ASSESSMENT & PLAN NOTE
With chronically elevated troponin, likely due to decrease clearance from ESRD  Trended troponin and static  Cardiology evaluated the patient and do not think ACS  Denies current chest pain  Tele monitoring

## 2019-10-24 NOTE — ASSESSMENT & PLAN NOTE
Likely multifactorial: smoker- unwilling to quit, pulmonary hypertension, COPD, and volume overload   PE cannot be excluded per admitting CTA chest.  Repeated CTA chest and no obvious PE.  Lovenox stopped.  Routine HD per renal  O2 supplement as needed  Currently euvolemic on exam

## 2019-10-24 NOTE — CONSULTS
[facility]  Cardiology  Consult Note    Patient Name: Griselda Neely  MRN: [id number]  Admission Date: 10/22/2019  Hospital Length of Stay: 0 days  Code Status: DNR   Attending Provider: Hilary La MD   Consulting Provider: Ramila Bloom NP  Primary Care Physician: Kalie Neely MD  Principal Problem:Chest pain    Patient information was obtained from patient, past medical records and ER records.     Inpatient consult to Cardiology  Consult performed by: Bebe Bingham MD  Consult ordered by: REGINA Bolanos        Subjective:     REASON FOR CONSULT:  Chest pain      HPI:  Ms. Neely is a 74 year old female patient with past medical history significant for atrial fibrillation, ESRD, COPD, MVR, HFrEF, tobacco abuse and anemia. She had a recent hospitalization for acute on chronic respiratory failure with associated atrial fibrillation with RVR. She was discharged on 10/22/2019 and readmitted later that same day for complaints of chest pain. Shortly after she was discharged home on 10/22 she complained of chest pain and asked to be brought back to the ER. She can not tell me the severity or quality of her chest pains. She does not know how long it lasted. She reports she had shortness of breath at that time but it is improved now. She thinks she had the chest pain because she has been coughing a lot. She is not producing any sputum. In the ED she was given Lovenox and aspirin. She had a CT scan which showed bilateral pleural effusions, right greater than left, and PE could not be ruled out. Repeat CTA today with no evidence of PE. Presently she denies any chest pain or shortness of breath and wants to go home. Her troponin is elevated which is chronic and likely secondary to ESRD. ECG shows atrial fibrillation which has been rate controlled this admission. She does have inferolateral T wave inversions which were present in the lateral leads on previous ECG.      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    both legs    CHF (congestive heart failure)     Encounter for blood transfusion 03/2016    Gout     Hypertension     Mitral valve regurgitation     Osteoarthritis     Pancreatitis     Peripheral vascular disease     Peritoneal dialysis catheter in place     Pneumonia 09/09/2017    Renal failure        Past Surgical History:   Procedure Laterality Date    ACHILLES TENDON SURGERY Right     APPENDECTOMY      CARDIAC CATHETERIZATION  07/03/2017    CHOLECYSTECTOMY      heal surgery Right     HYSTERECTOMY      PARATHYROIDECTOMY      PARATHYROIDECTOMY  07/13/2017    PERITONEAL CATHETER INSERTION      RENAL BIOPSY      vocal cord nodule         Review of patient's allergies indicates:  No Known Allergies    No current facility-administered medications on file prior to encounter.      Current Outpatient Medications on File Prior to Encounter   Medication Sig    aspirin 81 MG Chew Take 81 mg by mouth once daily.     calcium acetate (PHOSLO) 667 mg capsule Take 1,334 mg by mouth 3 (three) times daily with meals.    diltiaZEM (CARDIZEM CD) 180 MG 24 hr capsule Take 180 mg by mouth once daily.    fluticasone furoate-vilanterol (BREO) 100-25 mcg/dose diskus inhaler Inhale 1 puff into the lungs once daily. Controller    isosorbide mononitrate (IMDUR) 30 MG 24 hr tablet Take 30 mg by mouth once daily.    levETIRAcetam (KEPPRA) 500 MG Tab Take 500 mg by mouth every Mon, Wed, Fri. After diaylsis    losartan (COZAAR) 25 MG tablet Take 25 mg by mouth once daily.    magnesium oxide (MAG-OX) 400 mg (241.3 mg magnesium) tablet Take 400 mg by mouth once daily.    metoprolol succinate (TOPROL-XL) 50 MG 24 hr tablet Take 1 tablet (50 mg total) by mouth once daily. (Patient taking differently: Take 50 mg by mouth once daily. )    ondansetron (ZOFRAN-ODT) 4 MG TbDL Take 1 tablet (4 mg total) by mouth every 6 (six) hours as needed.     oxyCODONE-acetaminophen (PERCOCET) 5-325 mg per tablet Take 1 tablet by mouth daily as needed for Pain (with dialysis).     sevelamer HCl (RENAGEL) 800 MG Tab Take 1 tablet (800 mg total) by mouth 3 (three) times daily with meals.    traMADol (ULTRAM) 50 mg tablet Take 1 tablet (50 mg total) by mouth 3 (three) times a week. 3 TIMES A WEEK AS NEEDED DURING HEMODIALYSIS       Scheduled Meds:   aspirin  81 mg Oral Daily    calcium acetate  1,334 mg Oral TID WM    diltiaZEM  180 mg Oral Daily    enoxaparin  60 mg Subcutaneous Q24H    fluticasone furoate-vilanterol  1 puff Inhalation Daily    isosorbide mononitrate  30 mg Oral Daily    levETIRAcetam  500 mg Oral Every Mon, Wed, Fri    losartan  25 mg Oral Daily    magnesium oxide  400 mg Oral Daily    metoprolol succinate  50 mg Oral Daily    mupirocin   Nasal BID    sevelamer HCl  800 mg Oral TID WM    sodium thiosulfate IVPB  12.5 g Intravenous Every Mon, Wed, Fri    traMADol  50 mg Oral Once per day on Mon Wed Fri     Continuous Infusions:  PRN Meds:.dextrose 50%, dextrose 50%, glucagon (human recombinant), glucose, glucose, insulin aspart U-100, nitroGLYCERIN, ondansetron, sodium chloride 0.9%    Family History     Problem Relation (Age of Onset)    Arthritis Mother    Diabetes Mother, Father    Early death Sister, Sister    Heart disease Sister, Maternal Grandfather, Mother    Heart failure Mother    Hypertension Mother          Tobacco Use    Smoking status: Current Some Day Smoker     Packs/day: 0.50     Years: 45.00     Pack years: 22.50    Smokeless tobacco: Never Used   Substance and Sexual Activity    Alcohol use: No    Drug use: No    Sexual activity: Not on file       ROS   No significant headaches or sore throat or runny nose.   No recent changes in vision.   No recent changes in hearing.  No dysphagia or odynophagia.  Reports shortness of breath at baseline. Reported chest pain. No chest pain currently.  Denies any hemoptysis. Reports  non productive cough.   Denies any abdominal pain, nausea, vomiting, diarrhea or constipation.   Denies any dysuria or polyuria.   Denies any fevers or chills.   Denies any recent significant weight changes.   Denies bleeding diathesis    Objective:     Vital Signs (Most Recent):  Temp: 97.8 °F (36.6 °C) (10/24/19 1157)  Pulse: 86 (10/24/19 1300)  Resp: (!) 22 (10/24/19 1157)  BP: (!) 139/93 (10/24/19 1157)  SpO2: 100 % (10/24/19 1157) Vital Signs (24h Range):  Temp:  [97.8 °F (36.6 °C)-98.7 °F (37.1 °C)] 97.8 °F (36.6 °C)  Pulse:  [] 86  Resp:  [16-22] 22  SpO2:  [96 %-100 %] 100 %  BP: (139-180)/(58-97) 139/93     Weight: 55.1 kg (121 lb 7.6 oz)  Body mass index is 20.21 kg/m².    SpO2: 100 %  O2 Device (Oxygen Therapy): nasal cannula      Intake/Output Summary (Last 24 hours) at 10/24/2019 1603  Last data filed at 10/24/2019 0600  Gross per 24 hour   Intake 740 ml   Output 2600 ml   Net -1860 ml       Lines/Drains/Airways     Drain                 Hemodialysis AV Fistula 08/08/19 0214 Right upper arm 77 days          Peripheral Intravenous Line                 Peripheral IV - Single Lumen 10/22/19 2019 20 G Left Forearm 1 day                Physical Exam  HEENT: Normocephalic, atraumatic, PERRL, Conjunctiva pink, no scleral icterus.   CVS: S1S2+, irregular,+ SM, rubs or gallops, JVP: Normal.  LUNGS: Diminished RLL  ABDOMEN: Soft, NT, BS+  EXTREMITIES: No cyanosis, clubbing or edema. RUE fistula + thrill  NEURO: AAO X 3.       Significant Labs:   BMP:   Recent Labs   Lab 10/22/19  1800 10/23/19  0559 10/24/19  0614   GLU 99 79 85    137 137   K 4.5 4.3 4.1   CL 98 96 94*   CO2 24 25 24   BUN 36* 42* 24*   CREATININE 5.6* 6.5* 5.1*   CALCIUM 7.8* 7.7* 7.8*   MG 2.3 2.5 2.1   , CMP   Recent Labs   Lab 10/22/19  1800 10/23/19  0559 10/24/19  0614    137 137   K 4.5 4.3 4.1   CL 98 96 94*   CO2 24 25 24   GLU 99 79 85   BUN 36* 42* 24*   CREATININE 5.6* 6.5* 5.1*   CALCIUM 7.8* 7.7* 7.8*   PROT 6.3   --   --    ALBUMIN 2.9*  --   --    BILITOT 1.3*  --   --    ALKPHOS 219*  --   --    AST 41*  --   --    *  --   --    ANIONGAP 15 16 19*   ESTGFRAFRICA 8.0* 6.7* 9.0*   EGFRNONAA 7.0* 5.8* 7.8*   , CBC   Recent Labs   Lab 10/22/19  1800 10/23/19  0559 10/24/19  0614   WBC 4.65 4.91 4.40   HGB 11.6* 12.2 12.7   HCT 36.9* 37.3 39.6   * 168 138*   , INR   Recent Labs   Lab 10/22/19  1800   INR 1.1   , Lipid Panel No results for input(s): CHOL, HDL, LDLCALC, TRIG, CHOLHDL in the last 48 hours., Troponin   Recent Labs   Lab 10/22/19  1800 10/22/19  2345 10/23/19  0559   TROPONINI 0.070* 0.072* 0.070*   , All pertinent lab results from the last 24 hours have been reviewed. and   Recent Lab Results       10/24/19  1146   10/24/19  0806   10/24/19  0614   10/24/19  0529   10/23/19  2133        Anion Gap     19         Baso #     0.02         Basophil%     0.5         BUN, Bld     24         Calcium     7.8         Chloride     94         CO2     24         Creatinine     5.1         Differential Method     Automated         eGFR if      9.0         eGFR if non      7.8  Comment:  Calculation used to obtain the estimated glomerular filtration  rate (eGFR) is the CKD-EPI equation.            Eos #     0.0         Eosinophil%     0.7         Glucose     85         Gran # (ANC)     3.1         Gran%     70.4         Hematocrit     39.6         Hemoglobin     12.7         Immature Grans (Abs)     0.02  Comment:  Mild elevation in immature granulocytes is non specific and   can be seen in a variety of conditions including stress response,   acute inflammation, trauma and pregnancy. Correlation with other   laboratory and clinical findings is essential.           Immature Granulocytes     0.5         Lymph #     0.7         Lymph%     16.1         Magnesium     2.1         MCH     29.5         MCHC     32.1         MCV     92         Mono #     0.5         Mono%     11.8         MPV      9.8         nRBC     0         Phosphorus     3.1         Platelets     138         POC Glucose 123 94   75 115     Potassium     4.1         RBC     4.31         RDW     19.6         Sodium     137         WBC     4.40                          10/23/19  2030        Anion Gap       Baso #       Basophil%       BUN, Bld       Calcium       Chloride       CO2       Creatinine       Differential Method       eGFR if        eGFR if non        Eos #       Eosinophil%       Glucose       Gran # (ANC)       Gran%       Hematocrit       Hemoglobin       Immature Grans (Abs)       Immature Granulocytes       Lymph #       Lymph%       Magnesium       MCH       MCHC       MCV       Mono #       Mono%       MPV       nRBC       Phosphorus       Platelets       POC Glucose 67     Potassium       RBC       RDW       Sodium       WBC             Significant Imaging: X-Ray: CXR: X-Ray Chest 1 View (CXR): No results found for this visit on 10/22/19.  EXAMINATION:  CTA CHEST NON CORONARY    CLINICAL HISTORY:  Chest pain, acute, PE suspected, high pretest prob;    TECHNIQUE:  CMS MANDATED QUALITY DATA - CT RADIATION - 436    All CT scans at this facility utilize dose modulation, iterative reconstruction, and/or weight based dosing when appropriate to reduce radiation dose to as low as reasonably achievable.    Maximum intensity projection coronal and sagittal reformations were created at a separate workstation and stored in the patients permanent medical record.    100  cc Omnipaque 350 was administered.    COMPARISON:  CTA chest 10/22/2019    FINDINGS:  Contrast bolus timing is adequate.  Previously seen heterogeneous intra arterial attenuation involving segmental and subsegmental pulmonary arterial branches is much less pronounced.  Some residual mixing artifact is present within right lower lobe pulmonary artery and its branches.  Main pulmonary artery is enlarged measuring 3.7 cm suggesting  pulmonary arterial hypertension.  Aorta is normal in caliber and demonstrates extensive atherosclerotic disease.  Cardiomegaly and atherosclerosis of the coronary arteries.  Mitral valve calcification and mitral valve prosthesis noted.    Bilateral pleural effusions, right greater than left, with loculated fluid tracking into the right major and minor fissures.  This is stable from prior.  Bilateral lower lobe ground-glass opacity.  No pneumothorax.    Central airways are patent.  Stable mediastinal lymph nodes.    Bone window images demonstrate no T7 superior endplate compression fracture there is also mild superior endplate compression of T4.   Impression:       No convincing evidence of pulmonary embolism, noting some residual mixing artifact involving right lower lobe pulmonary arteries.    Cardiomegaly and enlarged pulmonary artery, consistent with pulmonary arterial hypertension.    Bilateral pleural effusions, right greater than left.  Right pleural effusion is partially loculated.    Atherosclerosis and other incidental findings as above.     X-Ray Chest AP Portable [[id number]] Resulted: 10/22/19 1755   Order Status: Completed Updated: 10/22/19 1758   Narrative:     EXAMINATION:  XR CHEST AP PORTABLE    CLINICAL HISTORY:  CHF;    COMPARISON:  10/20/2019.    FINDINGS:  The heart is enlarged but stable.  Prominence of the central pulmonary vasculature remains unchanged.  Patchy ground-glass type opacities persist within the mid lung zones bilaterally with more focal airspace opacities or volume loss noted in the right lower lung zone, similar to the prior study.  Blunting of the right costophrenic angle is slightly greater on the current examination in compatible with small right-sided effusion.    A prosthetic aortic valve is again noted.  There is arteriosclerosis and tortuosity of the aorta.  The bones are demineralized.   Impression:       Cardiomegaly and central pulmonary vascular  prominence.    Bilateral airspace opacities, right greater than left, similar to the prior exam.    Small right-sided pleural effusion.       Assessment and Plan:     IMPRESSION:    1. Chest pain. Chronically elevated troponin. Trended down from previous hospitalization.  Possibly musculoskeletal secondary to cough. [facility] 3/2019 with non obstructive CAD.   2. Congestive heart failure. Chronic. BNP chronically elevated. LVEF ~40-45% on last echo 7/2019. Euvolemic on exam.   3. Recent COPD exacerbation. Home o2 at 3 L per NC.    4. Calciphlyaxis. Reportedly better.   5. Atrial fibrillation. Valvular. Persistent. Patient does not wish to be on any oral anticoagulation or to be cardioverted. Currently rate controlled.   6. Hypertension.   7. s/p MVR via right thoracotomy approach on 3/18/19 with Dr. Lora. Functioning appropriately per last echo.   8. CAD. Nonobstructive on [facility] done on 3/13/2019.   9. End-stage renal disease on hemodialysis MWF. Anuric. Followed by Dr. Ingram.   10. Chronic tobacco abuse.   11. Pulmonary hypertension  12. Bilateral pleural effusions. R>L.     RECOMMENDATIONS:  1. Recommend aggressive HD as tolerated for effusions. Consider US/thoracentesis?   2. Continue Cardizem 180 PO QD.   3. At this time, patient is persistent that she wants to leave the hospital. Advised patient to stay until she is cleared for discharge. She is agreeable to stay until seen by the cardiologist. No further ischemic workup is recommended at this time.     Thank you for your consult. I will follow-up with patient. Please contact us if you have any additional questions.    Ramila Bloom NP  Cardiology   [facility] 3/13/19: OM 1 proximal 30% stenosis, mid Lcx 40% stenosis; mid RCA 20% stenosis.

## 2019-10-25 VITALS
OXYGEN SATURATION: 100 % | TEMPERATURE: 98 F | HEART RATE: 90 BPM | DIASTOLIC BLOOD PRESSURE: 81 MMHG | RESPIRATION RATE: 18 BRPM | WEIGHT: 128.5 LBS | SYSTOLIC BLOOD PRESSURE: 155 MMHG | BODY MASS INDEX: 21.41 KG/M2 | HEIGHT: 65 IN

## 2019-10-25 PROBLEM — R06.02 SHORTNESS OF BREATH: Status: RESOLVED | Noted: 2019-10-22 | Resolved: 2019-10-25

## 2019-10-25 PROBLEM — T82.9XXA COMPLICATION OF AV DIALYSIS FISTULA: Status: RESOLVED | Noted: 2019-10-24 | Resolved: 2019-10-25

## 2019-10-25 PROBLEM — R07.9 CHEST PAIN: Status: RESOLVED | Noted: 2019-10-18 | Resolved: 2019-10-25

## 2019-10-25 LAB
ANION GAP SERPL CALC-SCNC: 21 MMOL/L (ref 8–16)
BASOPHILS # BLD AUTO: 0.01 K/UL (ref 0–0.2)
BASOPHILS NFR BLD: 0.2 % (ref 0–1.9)
BUN SERPL-MCNC: 36 MG/DL (ref 8–23)
CALCIUM SERPL-MCNC: 7.4 MG/DL (ref 8.7–10.5)
CHLORIDE SERPL-SCNC: 91 MMOL/L (ref 95–110)
CO2 SERPL-SCNC: 24 MMOL/L (ref 23–29)
CREAT SERPL-MCNC: 6.1 MG/DL (ref 0.5–1.4)
DIFFERENTIAL METHOD: ABNORMAL
EOSINOPHIL # BLD AUTO: 0.1 K/UL (ref 0–0.5)
EOSINOPHIL NFR BLD: 1.1 % (ref 0–8)
ERYTHROCYTE [DISTWIDTH] IN BLOOD BY AUTOMATED COUNT: 19 % (ref 11.5–14.5)
EST. GFR  (AFRICAN AMERICAN): 7.3 ML/MIN/1.73 M^2
EST. GFR  (NON AFRICAN AMERICAN): 6.3 ML/MIN/1.73 M^2
GLUCOSE SERPL-MCNC: 159 MG/DL (ref 70–110)
GLUCOSE SERPL-MCNC: 81 MG/DL (ref 70–110)
GLUCOSE SERPL-MCNC: 88 MG/DL (ref 70–110)
HCT VFR BLD AUTO: 35.9 % (ref 37–48.5)
HGB BLD-MCNC: 11.4 G/DL (ref 12–16)
IMM GRANULOCYTES # BLD AUTO: 0.03 K/UL (ref 0–0.04)
IMM GRANULOCYTES NFR BLD AUTO: 0.6 % (ref 0–0.5)
LYMPHOCYTES # BLD AUTO: 0.8 K/UL (ref 1–4.8)
LYMPHOCYTES NFR BLD: 16.6 % (ref 18–48)
MAGNESIUM SERPL-MCNC: 2.2 MG/DL (ref 1.6–2.6)
MCH RBC QN AUTO: 29.2 PG (ref 27–31)
MCHC RBC AUTO-ENTMCNC: 31.8 G/DL (ref 32–36)
MCV RBC AUTO: 92 FL (ref 82–98)
MONOCYTES # BLD AUTO: 0.5 K/UL (ref 0.3–1)
MONOCYTES NFR BLD: 10 % (ref 4–15)
NEUTROPHILS # BLD AUTO: 3.4 K/UL (ref 1.8–7.7)
NEUTROPHILS NFR BLD: 71.5 % (ref 38–73)
NRBC BLD-RTO: 0 /100 WBC
PHOSPHATE SERPL-MCNC: 3.2 MG/DL (ref 2.7–4.5)
PLATELET # BLD AUTO: 161 K/UL (ref 150–350)
PMV BLD AUTO: 9.8 FL (ref 9.2–12.9)
POTASSIUM SERPL-SCNC: 4 MMOL/L (ref 3.5–5.1)
RBC # BLD AUTO: 3.91 M/UL (ref 4–5.4)
SODIUM SERPL-SCNC: 136 MMOL/L (ref 136–145)
WBC # BLD AUTO: 4.7 K/UL (ref 3.9–12.7)

## 2019-10-25 PROCEDURE — 84100 ASSAY OF PHOSPHORUS: CPT

## 2019-10-25 PROCEDURE — 83735 ASSAY OF MAGNESIUM: CPT | Mod: 91

## 2019-10-25 PROCEDURE — 94640 AIRWAY INHALATION TREATMENT: CPT

## 2019-10-25 PROCEDURE — 27000221 HC OXYGEN, UP TO 24 HOURS

## 2019-10-25 PROCEDURE — 25000003 PHARM REV CODE 250: Performed by: NURSE PRACTITIONER

## 2019-10-25 PROCEDURE — 90935 HEMODIALYSIS ONE EVALUATION: CPT

## 2019-10-25 PROCEDURE — G0378 HOSPITAL OBSERVATION PER HR: HCPCS

## 2019-10-25 PROCEDURE — 94761 N-INVAS EAR/PLS OXIMETRY MLT: CPT

## 2019-10-25 PROCEDURE — 85025 COMPLETE CBC W/AUTO DIFF WBC: CPT

## 2019-10-25 PROCEDURE — 36415 COLL VENOUS BLD VENIPUNCTURE: CPT

## 2019-10-25 PROCEDURE — 25000003 PHARM REV CODE 250: Performed by: INTERNAL MEDICINE

## 2019-10-25 PROCEDURE — 80048 BASIC METABOLIC PNL TOTAL CA: CPT

## 2019-10-25 RX ADMIN — LOSARTAN POTASSIUM 25 MG: 25 TABLET, FILM COATED ORAL at 09:10

## 2019-10-25 RX ADMIN — MAGNESIUM OXIDE 400 MG: 400 TABLET ORAL at 09:10

## 2019-10-25 RX ADMIN — TRAMADOL HYDROCHLORIDE 50 MG: 50 TABLET, FILM COATED ORAL at 09:10

## 2019-10-25 RX ADMIN — DILTIAZEM HYDROCHLORIDE 180 MG: 180 CAPSULE, COATED, EXTENDED RELEASE ORAL at 09:10

## 2019-10-25 RX ADMIN — FLUTICASONE FUROATE AND VILANTEROL TRIFENATATE 1 PUFF: 100; 25 POWDER RESPIRATORY (INHALATION) at 08:10

## 2019-10-25 RX ADMIN — CALCIUM ACETATE 1334 MG: 667 CAPSULE ORAL at 09:10

## 2019-10-25 RX ADMIN — ONDANSETRON 4 MG: 4 TABLET, ORALLY DISINTEGRATING ORAL at 12:10

## 2019-10-25 RX ADMIN — ASPIRIN 81 MG 81 MG: 81 TABLET ORAL at 09:10

## 2019-10-25 RX ADMIN — ISOSORBIDE MONONITRATE 30 MG: 30 TABLET, EXTENDED RELEASE ORAL at 09:10

## 2019-10-25 RX ADMIN — SODIUM THIOSULFATE 12.5 G: 250 INJECTION, SOLUTION INTRAVENOUS at 03:10

## 2019-10-25 RX ADMIN — RENAGEL 800 MG: 800 TABLET ORAL at 09:10

## 2019-10-25 RX ADMIN — METOPROLOL SUCCINATE 50 MG: 50 TABLET, EXTENDED RELEASE ORAL at 09:10

## 2019-10-25 NOTE — PLAN OF CARE
10/24/19 2030   PRE-TX-O2   O2 Device (Oxygen Therapy) nasal cannula   Flow (L/min) 2   SpO2 98 %   Pulse 98   Resp 18   Aerosol Therapy   $ Aerosol Therapy Charges PRN treatment not required

## 2019-10-25 NOTE — PROGRESS NOTES
[facility] hospital  Cardiology  Progress Note    Patient Name: Griselda Neely  MRN: [id number]  Admission Date: 10/22/2019  Hospital Length of Stay: 0 days  Code Status: DNR   Attending Physician: Hilary La MD   Primary Care Physician: Kalie Neely MD  Expected Discharge Date:   Principal Problem:Chest pain    Subjective:       Interval History: Denies any further chest pain. Denies any breathing difficulty. Atrial fib is rate controlled.   ROS   Denies nausea or vomiting  Denies shortness of breath  Denies chest pain  Denies dizziness  Objective:     Vital Signs (Most Recent):  Temp: 98.2 °F (36.8 °C) (10/25/19 0339)  Pulse: 69 (10/25/19 0840)  Resp: 18 (10/25/19 0840)  BP: (!) 154/65 (10/25/19 0731)  SpO2: 100 % (10/25/19 0840) Vital Signs (24h Range):  Temp:  [97.8 °F (36.6 °C)-98.4 °F (36.9 °C)] 98.2 °F (36.8 °C)  Pulse:  [] 69  Resp:  [15-24] 18  SpO2:  [95 %-100 %] 100 %  BP: (139-165)/(65-94) 154/65     Weight: 58.3 kg (128 lb 8.5 oz)  Body mass index is 21.39 kg/m².    SpO2: 100 %  O2 Device (Oxygen Therapy): nasal cannula    No intake or output data in the 24 hours ending 10/25/19 1011    Lines/Drains/Airways     Drain                 Hemodialysis AV Fistula 08/08/19 0214 Right upper arm 78 days          Peripheral Intravenous Line                 Peripheral IV - Single Lumen 10/22/19 2019 20 G Left Forearm 2 days                Scheduled Meds:   aspirin  81 mg Oral Daily    calcium acetate  1,334 mg Oral TID WM    diltiaZEM  180 mg Oral Daily    fluticasone furoate-vilanterol  1 puff Inhalation Daily    isosorbide mononitrate  30 mg Oral Daily    levETIRAcetam  500 mg Oral Every Mon, Wed, Fri    losartan  25 mg Oral Daily    magnesium oxide  400 mg Oral Daily    metoprolol succinate  50 mg Oral Daily    mupirocin   Nasal BID    sevelamer HCl  800 mg Oral TID WM    sodium thiosulfate IVPB  12.5 g Intravenous Every Mon, Wed, Fri    traMADol  50 mg Oral Once per day on  Mon Wed Fri     Continuous Infusions:  PRN Meds:.dextrose 50%, dextrose 50%, glucagon (human recombinant), glucose, glucose, insulin aspart U-100, nitroGLYCERIN, ondansetron, sodium chloride 0.9%     Physical Exam  HEENT: Normocephalic, atraumatic, PERRL, Conjunctiva pink, no scleral icterus.   CVS: S1S2+, irregular,+ SM, rubs or gallops, JVP: Normal.  LUNGS: Diminished RLL  ABDOMEN: Soft, NT, BS+  EXTREMITIES: No cyanosis, clubbing or edema. RUE fistula + thrill  NEURO: AAO X 3.      Significant Labs:   BMP:   Recent Labs   Lab 10/24/19  0614 10/25/19  0601   GLU 85 81    136   K 4.1 4.0   CL 94* 91*   CO2 24 24   BUN 24* 36*   CREATININE 5.1* 6.1*   CALCIUM 7.8* 7.4*   MG 2.1 2.2   , CMP   Recent Labs   Lab 10/24/19  0614 10/25/19  0601    136   K 4.1 4.0   CL 94* 91*   CO2 24 24   GLU 85 81   BUN 24* 36*   CREATININE 5.1* 6.1*   CALCIUM 7.8* 7.4*   ANIONGAP 19* 21*   ESTGFRAFRICA 9.0* 7.3*   EGFRNONAA 7.8* 6.3*   , CBC   Recent Labs   Lab 10/24/19  0614 10/25/19  0601   WBC 4.40 4.70   HGB 12.7 11.4*   HCT 39.6 35.9*   * 161   , INR No results for input(s): INR, PROTIME in the last 48 hours., Lipid Panel No results for input(s): CHOL, HDL, LDLCALC, TRIG, CHOLHDL in the last 48 hours., Troponin No results for input(s): TROPONINI in the last 48 hours., All pertinent lab results from the last 24 hours have been reviewed. and   Recent Lab Results       10/25/19  0601   10/25/19  0525   10/24/19  2028   10/24/19  1146        Anion Gap 21           Baso # 0.01           Basophil% 0.2           BUN, Bld 36           Calcium 7.4           Chloride 91           CO2 24           Creatinine 6.1           Differential Method Automated           eGFR if  7.3           eGFR if non  6.3  Comment:  Calculation used to obtain the estimated glomerular filtration  rate (eGFR) is the CKD-EPI equation.              Eos # 0.1           Eosinophil% 1.1           Glucose 81            Gran # (ANC) 3.4           Gran% 71.5           Hematocrit 35.9           Hemoglobin 11.4           Immature Grans (Abs) 0.03  Comment:  Mild elevation in immature granulocytes is non specific and   can be seen in a variety of conditions including stress response,   acute inflammation, trauma and pregnancy. Correlation with other   laboratory and clinical findings is essential.             Immature Granulocytes 0.6           Lymph # 0.8           Lymph% 16.6           Magnesium 2.2           MCH 29.2           MCHC 31.8           MCV 92           Mono # 0.5           Mono% 10.0           MPV 9.8           nRBC 0           Phosphorus 3.2           Platelets 161           POC Glucose   88 131 123     Potassium 4.0           RBC 3.91           RDW 19.0           Sodium 136           WBC 4.70                 Significant Imaging: X-Ray: CXR: X-Ray Chest 1 View (CXR): No results found for this visit on 10/22/19.  Assessment and Plan:     IMPRESSION:     1. Chest pain. Chronically elevated troponin. Trended down from previous hospitalization.  Possibly musculoskeletal secondary to cough. [facility] 3/2019 with non obstructive CAD.   2. Congestive heart failure. Chronic. BNP chronically elevated. LVEF ~40-45% on last echo 7/2019. Euvolemic on exam.   3. Recent COPD exacerbation. Home o2 at 3 L per NC.    4. Calciphlyaxis. Reportedly better.   5. Atrial fibrillation. Valvular. Persistent. Patient does not wish to be on any oral anticoagulation or to be cardioverted. Currently rate controlled.   6. Hypertension.   7. s/p MVR via right thoracotomy approach on 3/18/19 with Dr. Lora. Functioning appropriately per last echo.   8. CAD. Nonobstructive on [facility] done on 3/13/2019. OM 1 proximal 30% stenosis, mid Lcx 40% stenosis; mid RCA 20% stenosis.   9. End-stage renal disease on hemodialysis MWF. Anuric. Followed by Dr. Ingram.   10. Chronic tobacco abuse.   11. Pulmonary hypertension  12. Bilateral pleural effusions. R>L.   13.  Bleeding from AV fistula site. Resolved. Lovenox was discontinued.       PLAN:  1. Patient should be stable for discharge after HD today on same meds from our standpoint. She call follow up in the clinic with Dr. Powell in a couple of weeks. She has an appointment with us on 11/5/2019.         Ramila Bloom NP  Cardiology  [facility]

## 2019-10-25 NOTE — PLAN OF CARE
10/25/19 1250   Discharge Reassessment   Assessment Type Discharge Planning Reassessment   Anticipated Discharge Disposition Home-Health   Referral for  sent to [facility] . Patient is a [facility] with them.

## 2019-10-25 NOTE — NURSING
HD complete, tolerated fair  Net UF 3L  VSS  Report to [facility]  Pt transported back to unit via bed

## 2019-10-25 NOTE — PLAN OF CARE
10/23/19 1111   Discharge Assessment   Assessment Type Discharge Planning Assessment   Confirmed/corrected address and phone number on facesheet? Yes   Assessment information obtained from? Patient;Caregiver  (Son present at the bedside.)   Prior to hospitilization cognitive status: Alert/Oriented   Prior to hospitalization functional status: Needs Assistance  (Son helps sometimes; Sister, Aide)   Current cognitive status: Alert/Oriented   Current Functional Status: Needs Assistance   Facility Arrived From: Home   Lives With sibling(s);child(jeny), adult  (Son and sisters)   Able to Return to Prior Arrangements yes   Is patient able to care for self after discharge? No  (Patient will need assistance from her sisters)   Who are your caregiver(s) and their phone number(s)? Aide Watson [phone]   Patient's perception of discharge disposition home health  (Patient chooses to resume services with [facility])   Readmission Within the Last 30 Days other (see comments)  (Patient was admitted on 10/18/2019 to [facility])   If yes, most recent facility name: [facility]   Patient currently being followed by outpatient case management? Yes   If yes, name of outpatient case management following: other (comments)  ([facility])   Patient currently receives any other outside agency services? Yes   How many hours a day does the patient receive services?   (Not sure which days paitent is seen by )   Name and contact number of agency or person providing outside services [facility] [phone]   Is it the patient/care giver preference to resume care with the current outside agency? Yes   Equipment Currently Used at Home bedside commode;bath bench;cane, quad;cane, straight;oxygen;walker, rolling;wound care supplies   Do you have any problems affording any of your prescribed medications? No   Is the patient taking medications as prescribed? yes   Does the patient have transportation home? Yes   Transportation  Anticipated family or friend will provide   Dialysis Name and Scheduled days [facility] MWF 10 am   Does the patient receive services at the [facility]? No   Discharge Plan A Home with family;Home Health   Discharge Plan B Home with family;Home Health   DME Needed Upon Discharge  none   Patient/Family in Agreement with Plan yes   Readmission Questionnaire   At the time of your discharge, did someone talk to you about what your health problems were? Yes   At the time of discharge, did someone talk to you about what to watch out for regarding worsening of your health problem? Yes   At the time of discharge, did someone talk to you about what to do if you experienced worsening of your health problem? Yes   At the time of discharge, did someone talk to you about which medication to take when you left the hospital and which ones to stop taking? Yes   At the time of discharge, did someone talk to you about when and where to follow up with a doctor after you left the hospital? Yes   How often do you need to have someone help you when you read instructions, pamphlets, or other written material from your doctor or pharmacy? Often   Do you have problems taking your medications as prescribed? No   Do you have any problems affording any of  your prescribed medications? No   Do you have problems obtaining/receiving your medications? No   Does the patient have transportation to healthcare appointments? Yes   Living Arrangements house   Does the patient have family/friends to help with healtcare needs after discharge? yes   Does your caregiver provide all the help you need? Yes   Are you currently feeling confused? No   Are you currently having problems thinking? No   Are you currently having memory problems? No   Have you felt down, depressed, or hopeless? 0   Have you felt little interest or pleasure in doing things? 0   In the last 7 days, my sleep quality was: fair   Dr La was notified that the patient is  active with [facility].

## 2019-10-25 NOTE — PLAN OF CARE
10/25/19 0840   Patient Assessment/Suction   Level of Consciousness (AVPU) alert   Respiratory Effort Unlabored;Normal   Expansion/Accessory Muscles/Retractions no use of accessory muscles   All Lung Fields Breath Sounds clear;diminished   Rhythm/Pattern, Respiratory unlabored;pattern regular;depth regular   Cough Frequency no cough;infrequent   Cough Type fair   PRE-TX-O2   O2 Device (Oxygen Therapy) nasal cannula   Flow (L/min) 2   SpO2 100 %   Pulse Oximetry Type Intermittent   $ Pulse Oximetry - Multiple Charge Pulse Oximetry - Multiple   Pulse 69   Resp 18   Inhaler   $ Inhaler Charges MDI (Metered Dose Inahler) Treatment;Mouth rinsed post treatment   Daily Review of Necessity (Inhaler) completed   Respiratory Treatment Status (Inhaler) given   Treatment Route (Inhaler) mouthpiece   Patient Position (Inhaler) HOB elevated   Post Treatment Assessment (Inhaler) breath sounds improved   Signs of Intolerance (Inhaler) none

## 2019-10-25 NOTE — PLAN OF CARE
10/25/19 1250   Discharge Reassessment   Assessment Type Discharge Planning Reassessment   Anticipated Discharge Disposition Home-Health   Patient choice form signed by patient/caregiver Yes  (Completed with the patient today at 1256. 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

## 2019-10-26 NOTE — DISCHARGE SUMMARY
[facility] Medicine  Discharge Summary      Patient Name: Griselda Neely  MRN: [id number]  Admission Date: 10/22/2019  Hospital Length of Stay: 0 days  Discharge Date and Time: 10/25/2019  5:55 PM  Attending Physician: No att. providers found   Discharging Provider: Hilary La MD  Primary Care Provider: Kalie Neely MD      HPI:   The patient is a 74-year-old  female with history of AFib, congestive heart failures, end-stage renal disease on HD Monday Wednesday and Friday, COPD and continued to smoke, severe peripheral vascular disease, calciphylaxis, history of blood transfusion, gout, hypertension, and hyperlipidemia. She presented to the ED with chest pain and severe shortness of breath. She was discharged home earlier today. She was admitted here on 10/18/2019 for acute on chronic respiratory failure thought to be secondary to AFib with RVR. She initially received Cardizem loading dose then placed on Cardizem drip.  Cardiology was consulted with recommendations to resume oral Cardizem.   Her troponin levels were elevated,  thought to be secondary to demand ischemia with no plans for further workup.  Shortness of breath also thought to be secondary to volume overload. Nephrology was consulted for dialysis.      She states that she got home around 1 PM today. She has intermittent chest pain but cannot describe her pain, duration, its location, radiation, associated symptoms, aggravating or alleviating factors. Currently she denies pain. She also reports severe shortness of breath associated with activities. She cannot state what she was doing at the time of her shortness of breath. She denies fever or chills. She reports nonproductive cough. She denies abdominal pain but reports nausea and vomiting. She states that she vomited 3 times at home. Currently, she denies shortness of breath. Oxygen saturation is 98 -99% on 2 L nasal cannula.      CTA chest in the ED  was inconclusive for PE. She received Lovenox 60 mg subq in the ED.    * No surgery found *      Hospital Course:   Patient just discharged after evaluation for shortness of breath and chest pain.  She came back the same evening with return of same symptoms.  CTA of the chest done and poor study and thus cannot say if she has or does not have PE.  Started on renal dose anticoagulation. Repeat CTA chest done 10/24 and better study with no convincing evidence of PE.  Lovenox stopped.  Cardiology consulted and evaluated the patient and believes euvolemic at this time and not ACS. Slight elevation in troponin but static and less than prior admission.  Renal consulted for routine HD.  Had acute bleeding from her right upper extremity fistula 10/24.  Pressure dressing applied and bleeding has stopped.   Patient cleared for discharge and will follow up with PCP and cardiology.  She wants to home home.  Return precautions given.     Consults:   Consults (From admission, onward)        Status Ordering Provider     Inpatient consult to Cardiology  Once     Provider:  Katharina Powell MD    Completed CHATA APARICIO          No new Assessment & Plan notes have been filed under this hospital service since the last note was generated.  Service: Hospital Medicine    Final Active Diagnoses:    Diagnosis Date Noted POA    ESRD (end stage renal disease) on HD M,W, F [N18.6] 05/11/2016 Yes    DM (diabetes mellitus) [E11.9] 08/15/2013 Yes    HTN (hypertension) [I10] 08/15/2013 Yes      Problems Resolved During this Admission:    Diagnosis Date Noted Date Resolved POA    PRINCIPAL PROBLEM:  Chest pain [R07.9] 10/18/2019 10/25/2019 Yes    Complication of AV dialysis fistula, bleeding [T82.9XXA] 10/24/2019 10/25/2019 No    Shortness of breath [R06.02] 10/22/2019 10/25/2019 Yes       Discharged Condition: good    Disposition: Home or Self Care    Follow Up:   Contact information for follow-up providers     Kalie WHITESIDE  MD Chin In 2 weeks.    Specialty:  Family Medicine  Contact information:  [address]  [address]  [place] [address]  [phone]                   Contact information for after-discharge care     [facility]     [facility]    Service:  Home Health Services  Contact information:  [address]  [place] [address]  [phone]                             Patient Instructions:      Referral to Home health   Referral Priority: Routine Referral Type: Home Health   Referral Reason: Specialty Services Required   Requested Specialty: Home Health Services   Number of Visits Requested: 1     Diet Cardiac     Diet renal     Notify your health care provider if you experience any of the following:  temperature >100.4     Notify your health care provider if you experience any of the following:  persistent nausea and vomiting or diarrhea     Notify your health care provider if you experience any of the following:  severe uncontrolled pain     Notify your health care provider if you experience any of the following:  increased confusion or weakness     Activity as tolerated       Significant Diagnostic Studies: Labs:   CMP   Recent Labs   Lab 10/24/19  0614 10/25/19  0601    136   K 4.1 4.0   CL 94* 91*   CO2 24 24   GLU 85 81   BUN 24* 36*   CREATININE 5.1* 6.1*   CALCIUM 7.8* 7.4*   ANIONGAP 19* 21*   ESTGFRAFRICA 9.0* 7.3*   EGFRNONAA 7.8* 6.3*    and CBC   Recent Labs   Lab 10/24/19  0614 10/25/19  0601   WBC 4.40 4.70   HGB 12.7 11.4*   HCT 39.6 35.9*   * 161       Pending Diagnostic Studies:     Procedure Component Value Units Date/Time    Lactic acid, plasma [[id number]] Collected:  10/22/19 1800    Order Status:  Sent Lab Status:  In process Updated:  10/22/19 1821    Specimen:  Blood     Lactic acid, plasma [[id number]] Collected:  10/22/19 1800    Order Status:  Sent Lab Status:  In process Updated:  10/22/19 1821    Specimen:  Blood          Medications:  Reconciled Home Medications:       Medication List      CHANGE how you take these medications    diltiaZEM 180 MG 24 hr capsule  Commonly known as:  CARDIZEM CD  Take 180 mg by mouth once daily.  What changed:  Another medication with the same name was removed. Continue taking this medication, and follow the directions you see here.        CONTINUE taking these medications    aspirin 81 MG Chew  Take 81 mg by mouth once daily.     calcium acetate 667 mg capsule  Commonly known as:  PHOSLO  Take 1,334 mg by mouth 3 (three) times daily with meals.     fluticasone furoate-vilanterol 100-25 mcg/dose diskus inhaler  Commonly known as:  BREO  Inhale 1 puff into the lungs once daily. Controller     isosorbide mononitrate 30 MG 24 hr tablet  Commonly known as:  IMDUR  Take 30 mg by mouth once daily.     levETIRAcetam 500 MG Tab  Commonly known as:  KEPPRA  Take 500 mg by mouth every Mon, Wed, Fri. After diaylsis     losartan 25 MG tablet  Commonly known as:  COZAAR  Take 25 mg by mouth once daily.     magnesium oxide 400 mg (241.3 mg magnesium) tablet  Commonly known as:  MAG-OX  Take 400 mg by mouth once daily.     metoprolol succinate 50 MG 24 hr tablet  Commonly known as:  TOPROL-XL  Take 1 tablet (50 mg total) by mouth once daily.     ondansetron 4 MG Tbdl  Commonly known as:  ZOFRAN-ODT  Take 1 tablet (4 mg total) by mouth every 6 (six) hours as needed.     oxyCODONE-acetaminophen 5-325 mg per tablet  Commonly known as:  PERCOCET  Take 1 tablet by mouth daily as needed for Pain (with dialysis).     sevelamer HCl 800 MG Tab  Commonly known as:  RENAGEL  Take 1 tablet (800 mg total) by mouth 3 (three) times daily with meals.     traMADol 50 mg tablet  Commonly known as:  ULTRAM  Take 1 tablet (50 mg total) by mouth 3 (three) times a week. 3 TIMES A WEEK AS NEEDED DURING HEMODIALYSIS        STOP taking these medications    predniSONE 10 MG tablet  Commonly known as:  DELTASONE            Indwelling Lines/Drains at time of discharge:    Lines/Drains/Airways     Drain                 Hemodialysis AV Fistula 08/08/19 0214 Right upper arm 78 days          Pressure Ulcer                 Pressure Injury 10/23/19 1300 Right Buttocks Stage 2 2 days                Time spent on the discharge of patient: 28 minutes  Patient was seen and examined on the date of discharge and determined to be suitable for discharge.         Hilary La MD  Department of Hospital Medicine  [facility]

## 2019-10-27 LAB — BACTERIA BLD CULT: NORMAL

## 2019-12-20 ENCOUNTER — HOSPITAL ENCOUNTER (OUTPATIENT)
Facility: HOSPITAL | Age: 74
Discharge: HOME | End: 2019-12-21
Attending: EMERGENCY MEDICINE | Admitting: FAMILY MEDICINE
Payer: MEDICARE

## 2019-12-20 DIAGNOSIS — R07.9 CHEST PAIN: ICD-10-CM

## 2019-12-20 DIAGNOSIS — R41.82 AMS (ALTERED MENTAL STATUS): Primary | ICD-10-CM

## 2019-12-20 DIAGNOSIS — Z99.2 STAGE 5 CHRONIC KIDNEY DISEASE ON CHRONIC DIALYSIS: ICD-10-CM

## 2019-12-20 DIAGNOSIS — A41.9 SEPSIS, DUE TO UNSPECIFIED ORGANISM, UNSPECIFIED WHETHER ACUTE ORGAN DYSFUNCTION PRESENT: ICD-10-CM

## 2019-12-20 DIAGNOSIS — N18.6 STAGE 5 CHRONIC KIDNEY DISEASE ON CHRONIC DIALYSIS: ICD-10-CM

## 2019-12-20 DIAGNOSIS — G93.40 ACUTE ENCEPHALOPATHY: ICD-10-CM

## 2019-12-20 DIAGNOSIS — J18.9 PNEUMONIA OF RIGHT LUNG DUE TO INFECTIOUS ORGANISM, UNSPECIFIED PART OF LUNG: ICD-10-CM

## 2019-12-20 PROBLEM — I48.91 ATRIAL FIBRILLATION: Chronic | Status: ACTIVE | Noted: 2017-07-18

## 2019-12-20 PROBLEM — R53.1 GENERALIZED WEAKNESS: Status: ACTIVE | Noted: 2019-12-20

## 2019-12-20 PROBLEM — E78.5 HLD (HYPERLIPIDEMIA): Chronic | Status: ACTIVE | Noted: 2019-12-20

## 2019-12-20 PROBLEM — R56.9 SEIZURES: Chronic | Status: ACTIVE | Noted: 2019-12-20

## 2019-12-20 PROBLEM — J96.10 CHRONIC RESPIRATORY FAILURE: Chronic | Status: ACTIVE | Noted: 2019-05-13

## 2019-12-20 PROBLEM — I73.9 PVD (PERIPHERAL VASCULAR DISEASE): Chronic | Status: ACTIVE | Noted: 2019-12-20

## 2019-12-20 PROBLEM — I50.9 CONGESTIVE HEART FAILURE: Chronic | Status: ACTIVE | Noted: 2019-10-19

## 2019-12-20 PROBLEM — E83.59 CALCIPHYLAXIS: Chronic | Status: ACTIVE | Noted: 2017-07-18

## 2019-12-20 PROBLEM — I27.20 PULMONARY HYPERTENSION: Chronic | Status: ACTIVE | Noted: 2017-07-25

## 2019-12-20 LAB
ALBUMIN SERPL BCP-MCNC: 3 G/DL (ref 3.5–5.2)
ALP SERPL-CCNC: 167 U/L (ref 55–135)
ALT SERPL W/O P-5'-P-CCNC: 14 U/L (ref 10–44)
AMMONIA PLAS-SCNC: 33 UMOL/L (ref 10–50)
ANION GAP SERPL CALC-SCNC: 25 MMOL/L (ref 8–16)
AST SERPL-CCNC: 26 U/L (ref 10–40)
BASOPHILS # BLD AUTO: 0.01 K/UL (ref 0–0.2)
BASOPHILS NFR BLD: 0.1 % (ref 0–1.9)
BILIRUB SERPL-MCNC: 2.1 MG/DL (ref 0.1–1)
BNP SERPL-MCNC: >4500 PG/ML (ref 0–99)
BUN SERPL-MCNC: 43 MG/DL (ref 8–23)
CALCIUM SERPL-MCNC: 8.6 MG/DL (ref 8.7–10.5)
CHLORIDE SERPL-SCNC: 87 MMOL/L (ref 95–110)
CO2 SERPL-SCNC: 25 MMOL/L (ref 23–29)
CREAT SERPL-MCNC: 5.7 MG/DL (ref 0.5–1.4)
DIFFERENTIAL METHOD: ABNORMAL
EOSINOPHIL # BLD AUTO: 0 K/UL (ref 0–0.5)
EOSINOPHIL NFR BLD: 0 % (ref 0–8)
ERYTHROCYTE [DISTWIDTH] IN BLOOD BY AUTOMATED COUNT: 18.2 % (ref 11.5–14.5)
EST. GFR  (AFRICAN AMERICAN): 7.9 ML/MIN/1.73 M^2
EST. GFR  (NON AFRICAN AMERICAN): 6.8 ML/MIN/1.73 M^2
GLUCOSE SERPL-MCNC: 146 MG/DL (ref 70–110)
GLUCOSE SERPL-MCNC: 174 MG/DL (ref 70–110)
GLUCOSE SERPL-MCNC: 80 MG/DL (ref 70–110)
HCT VFR BLD AUTO: 33.8 % (ref 37–48.5)
HGB BLD-MCNC: 10.8 G/DL (ref 12–16)
IMM GRANULOCYTES # BLD AUTO: 0.06 K/UL (ref 0–0.04)
IMM GRANULOCYTES NFR BLD AUTO: 0.7 % (ref 0–0.5)
LACTATE SERPL-SCNC: 1.4 MMOL/L (ref 0.5–1.9)
LACTATE SERPL-SCNC: 2.3 MMOL/L (ref 0.5–1.9)
LACTATE SERPL-SCNC: 2.6 MMOL/L (ref 0.5–1.9)
LYMPHOCYTES # BLD AUTO: 0.6 K/UL (ref 1–4.8)
LYMPHOCYTES NFR BLD: 7.3 % (ref 18–48)
MCH RBC QN AUTO: 27.7 PG (ref 27–31)
MCHC RBC AUTO-ENTMCNC: 32 G/DL (ref 32–36)
MCV RBC AUTO: 87 FL (ref 82–98)
MONOCYTES # BLD AUTO: 0.4 K/UL (ref 0.3–1)
MONOCYTES NFR BLD: 5 % (ref 4–15)
NEUTROPHILS # BLD AUTO: 7.4 K/UL (ref 1.8–7.7)
NEUTROPHILS NFR BLD: 86.9 % (ref 38–73)
NRBC BLD-RTO: 0 /100 WBC
PLATELET # BLD AUTO: 181 K/UL (ref 150–350)
PMV BLD AUTO: 10.4 FL (ref 9.2–12.9)
POTASSIUM SERPL-SCNC: 4.6 MMOL/L (ref 3.5–5.1)
PROT SERPL-MCNC: 7.6 G/DL (ref 6–8.4)
RBC # BLD AUTO: 3.9 M/UL (ref 4–5.4)
SODIUM SERPL-SCNC: 137 MMOL/L (ref 136–145)
TROPONIN I SERPL DL<=0.01 NG/ML-MCNC: 0.1 NG/ML
WBC # BLD AUTO: 8.53 K/UL (ref 3.9–12.7)

## 2019-12-20 PROCEDURE — G0378 HOSPITAL OBSERVATION PER HR: HCPCS

## 2019-12-20 PROCEDURE — 93005 ELECTROCARDIOGRAM TRACING: CPT | Mod: 59

## 2019-12-20 PROCEDURE — 84484 ASSAY OF TROPONIN QUANT: CPT

## 2019-12-20 PROCEDURE — 82140 ASSAY OF AMMONIA: CPT

## 2019-12-20 PROCEDURE — 83880 ASSAY OF NATRIURETIC PEPTIDE: CPT

## 2019-12-20 PROCEDURE — 96375 TX/PRO/DX INJ NEW DRUG ADDON: CPT

## 2019-12-20 PROCEDURE — 25000003 PHARM REV CODE 250: Performed by: INTERNAL MEDICINE

## 2019-12-20 PROCEDURE — 83605 ASSAY OF LACTIC ACID: CPT | Mod: 91

## 2019-12-20 PROCEDURE — 85025 COMPLETE CBC W/AUTO DIFF WBC: CPT

## 2019-12-20 PROCEDURE — 63600175 PHARM REV CODE 636 W HCPCS: Performed by: FAMILY MEDICINE

## 2019-12-20 PROCEDURE — 80053 COMPREHEN METABOLIC PANEL: CPT

## 2019-12-20 PROCEDURE — 87040 BLOOD CULTURE FOR BACTERIA: CPT | Mod: 59

## 2019-12-20 PROCEDURE — 99285 EMERGENCY DEPT VISIT HI MDM: CPT | Mod: 25

## 2019-12-20 PROCEDURE — 25000003 PHARM REV CODE 250: Performed by: FAMILY MEDICINE

## 2019-12-20 PROCEDURE — 90935 HEMODIALYSIS ONE EVALUATION: CPT

## 2019-12-20 PROCEDURE — 96365 THER/PROPH/DIAG IV INF INIT: CPT | Mod: 59

## 2019-12-20 PROCEDURE — 36415 COLL VENOUS BLD VENIPUNCTURE: CPT

## 2019-12-20 PROCEDURE — 99900035 HC TECH TIME PER 15 MIN (STAT)

## 2019-12-20 PROCEDURE — 63600175 PHARM REV CODE 636 W HCPCS: Performed by: EMERGENCY MEDICINE

## 2019-12-20 RX ORDER — SEVELAMER CARBONATE 800 MG/1
800 TABLET, FILM COATED ORAL
Status: DISCONTINUED | OUTPATIENT
Start: 2019-12-20 | End: 2019-12-21 | Stop reason: HOSPADM

## 2019-12-20 RX ORDER — POTASSIUM CHLORIDE 20 MEQ/15ML
40 SOLUTION ORAL
Status: DISCONTINUED | OUTPATIENT
Start: 2019-12-20 | End: 2019-12-21 | Stop reason: HOSPADM

## 2019-12-20 RX ORDER — SODIUM,POTASSIUM PHOSPHATES 280-250MG
2 POWDER IN PACKET (EA) ORAL
Status: DISCONTINUED | OUTPATIENT
Start: 2019-12-20 | End: 2019-12-21 | Stop reason: HOSPADM

## 2019-12-20 RX ORDER — IBUPROFEN 200 MG
16 TABLET ORAL
Status: DISCONTINUED | OUTPATIENT
Start: 2019-12-20 | End: 2019-12-21 | Stop reason: HOSPADM

## 2019-12-20 RX ORDER — LANOLIN ALCOHOL/MO/W.PET/CERES
800 CREAM (GRAM) TOPICAL
Status: DISCONTINUED | OUTPATIENT
Start: 2019-12-20 | End: 2019-12-21 | Stop reason: HOSPADM

## 2019-12-20 RX ORDER — POTASSIUM CHLORIDE 20 MEQ/15ML
20 SOLUTION ORAL
Status: DISCONTINUED | OUTPATIENT
Start: 2019-12-20 | End: 2019-12-21 | Stop reason: HOSPADM

## 2019-12-20 RX ORDER — ACETAMINOPHEN 325 MG/1
650 TABLET ORAL EVERY 4 HOURS PRN
Status: DISCONTINUED | OUTPATIENT
Start: 2019-12-20 | End: 2019-12-21 | Stop reason: HOSPADM

## 2019-12-20 RX ORDER — SODIUM CHLORIDE 9 MG/ML
INJECTION, SOLUTION INTRAVENOUS
Status: CANCELLED | OUTPATIENT
Start: 2019-12-20

## 2019-12-20 RX ORDER — LEVETIRACETAM 500 MG/1
500 TABLET ORAL
Status: DISCONTINUED | OUTPATIENT
Start: 2019-12-20 | End: 2019-12-21 | Stop reason: HOSPADM

## 2019-12-20 RX ORDER — POLYETHYLENE GLYCOL 3350 17 G/17G
17 POWDER, FOR SOLUTION ORAL DAILY
Status: DISCONTINUED | OUTPATIENT
Start: 2019-12-20 | End: 2019-12-21 | Stop reason: HOSPADM

## 2019-12-20 RX ORDER — GLUCAGON 1 MG
1 KIT INJECTION
Status: DISCONTINUED | OUTPATIENT
Start: 2019-12-20 | End: 2019-12-21 | Stop reason: HOSPADM

## 2019-12-20 RX ORDER — CALCIUM ACETATE 667 MG/1
1334 CAPSULE ORAL
Status: DISCONTINUED | OUTPATIENT
Start: 2019-12-20 | End: 2019-12-21 | Stop reason: HOSPADM

## 2019-12-20 RX ORDER — ENOXAPARIN SODIUM 100 MG/ML
30 INJECTION SUBCUTANEOUS EVERY 24 HOURS
Status: DISCONTINUED | OUTPATIENT
Start: 2019-12-20 | End: 2019-12-21

## 2019-12-20 RX ORDER — NAPROXEN SODIUM 220 MG/1
81 TABLET, FILM COATED ORAL NIGHTLY
Status: DISCONTINUED | OUTPATIENT
Start: 2019-12-20 | End: 2019-12-21 | Stop reason: HOSPADM

## 2019-12-20 RX ORDER — SODIUM CHLORIDE 0.9 % (FLUSH) 0.9 %
2 SYRINGE (ML) INJECTION
Status: DISCONTINUED | OUTPATIENT
Start: 2019-12-20 | End: 2019-12-21 | Stop reason: HOSPADM

## 2019-12-20 RX ORDER — LOSARTAN POTASSIUM 25 MG/1
25 TABLET ORAL NIGHTLY
Status: DISCONTINUED | OUTPATIENT
Start: 2019-12-20 | End: 2019-12-21 | Stop reason: HOSPADM

## 2019-12-20 RX ORDER — VANCOMYCIN HCL IN 5 % DEXTROSE 1G/250ML
1000 PLASTIC BAG, INJECTION (ML) INTRAVENOUS ONCE
Status: COMPLETED | OUTPATIENT
Start: 2019-12-20 | End: 2019-12-20

## 2019-12-20 RX ORDER — MUPIROCIN 20 MG/G
OINTMENT TOPICAL 2 TIMES DAILY
Status: CANCELLED | OUTPATIENT
Start: 2019-12-20 | End: 2019-12-25

## 2019-12-20 RX ORDER — SODIUM THIOSULFATE 250 MG/ML
25 INJECTION, SOLUTION INTRAVENOUS
Status: DISCONTINUED | OUTPATIENT
Start: 2019-12-20 | End: 2019-12-21 | Stop reason: HOSPADM

## 2019-12-20 RX ORDER — ISOSORBIDE MONONITRATE 30 MG/1
30 TABLET, EXTENDED RELEASE ORAL NIGHTLY
Status: DISCONTINUED | OUTPATIENT
Start: 2019-12-20 | End: 2019-12-21 | Stop reason: HOSPADM

## 2019-12-20 RX ORDER — ONDANSETRON 2 MG/ML
4 INJECTION INTRAMUSCULAR; INTRAVENOUS EVERY 8 HOURS PRN
Status: DISCONTINUED | OUTPATIENT
Start: 2019-12-20 | End: 2019-12-21 | Stop reason: HOSPADM

## 2019-12-20 RX ORDER — IBUPROFEN 200 MG
24 TABLET ORAL
Status: DISCONTINUED | OUTPATIENT
Start: 2019-12-20 | End: 2019-12-21 | Stop reason: HOSPADM

## 2019-12-20 RX ORDER — FLUTICASONE FUROATE AND VILANTEROL 100; 25 UG/1; UG/1
1 POWDER RESPIRATORY (INHALATION) DAILY
Status: DISCONTINUED | OUTPATIENT
Start: 2019-12-20 | End: 2019-12-21 | Stop reason: HOSPADM

## 2019-12-20 RX ORDER — METOPROLOL SUCCINATE 25 MG/1
50 TABLET, EXTENDED RELEASE ORAL NIGHTLY
Status: DISCONTINUED | OUTPATIENT
Start: 2019-12-20 | End: 2019-12-21 | Stop reason: HOSPADM

## 2019-12-20 RX ORDER — IPRATROPIUM BROMIDE AND ALBUTEROL SULFATE 2.5; .5 MG/3ML; MG/3ML
3 SOLUTION RESPIRATORY (INHALATION) EVERY 6 HOURS PRN
Status: DISCONTINUED | OUTPATIENT
Start: 2019-12-20 | End: 2019-12-21 | Stop reason: HOSPADM

## 2019-12-20 RX ORDER — SODIUM CHLORIDE 9 MG/ML
INJECTION, SOLUTION INTRAVENOUS ONCE
Status: CANCELLED | OUTPATIENT
Start: 2019-12-20 | End: 2019-12-20

## 2019-12-20 RX ORDER — TALC
6 POWDER (GRAM) TOPICAL NIGHTLY PRN
Status: DISCONTINUED | OUTPATIENT
Start: 2019-12-20 | End: 2019-12-21 | Stop reason: HOSPADM

## 2019-12-20 RX ORDER — IBUPROFEN 400 MG/1
400 TABLET ORAL ONCE
Status: DISCONTINUED | OUTPATIENT
Start: 2019-12-20 | End: 2019-12-21 | Stop reason: HOSPADM

## 2019-12-20 RX ORDER — DILTIAZEM HYDROCHLORIDE 180 MG/1
180 CAPSULE, COATED, EXTENDED RELEASE ORAL NIGHTLY
Status: DISCONTINUED | OUTPATIENT
Start: 2019-12-20 | End: 2019-12-21 | Stop reason: HOSPADM

## 2019-12-20 RX ORDER — ACETAMINOPHEN 325 MG/1
650 TABLET ORAL EVERY 8 HOURS PRN
Status: DISCONTINUED | OUTPATIENT
Start: 2019-12-20 | End: 2019-12-21 | Stop reason: HOSPADM

## 2019-12-20 RX ADMIN — PIPERACILLIN AND TAZOBACTAM 3.38 G: 3; .375 INJECTION, POWDER, LYOPHILIZED, FOR SOLUTION INTRAVENOUS; PARENTERAL at 06:12

## 2019-12-20 RX ADMIN — SODIUM THIOSULFATE 25 G: 250 INJECTION, SOLUTION INTRAVENOUS at 01:12

## 2019-12-20 RX ADMIN — METOPROLOL SUCCINATE 50 MG: 25 TABLET, FILM COATED, EXTENDED RELEASE ORAL at 08:12

## 2019-12-20 RX ADMIN — ASPIRIN 81 MG 81 MG: 81 TABLET ORAL at 08:12

## 2019-12-20 RX ADMIN — LOSARTAN POTASSIUM 25 MG: 25 TABLET, FILM COATED ORAL at 08:12

## 2019-12-20 RX ADMIN — DILTIAZEM HYDROCHLORIDE 180 MG: 180 CAPSULE, COATED, EXTENDED RELEASE ORAL at 08:12

## 2019-12-20 RX ADMIN — ACETAMINOPHEN 650 MG: 325 TABLET ORAL at 04:12

## 2019-12-20 RX ADMIN — VANCOMYCIN HYDROCHLORIDE 1000 MG: 1 INJECTION, POWDER, LYOPHILIZED, FOR SOLUTION INTRAVENOUS at 07:12

## 2019-12-20 RX ADMIN — ISOSORBIDE MONONITRATE 30 MG: 30 TABLET, EXTENDED RELEASE ORAL at 08:12

## 2019-12-20 NOTE — ED NOTES
"1st contact with pt, alert, confused,refusing to answer questions, pt's sister at bedside reports change in mental status, " they gave her two pain pills in dialysis, she come home she sleeping,she couldn't even stand" reports noticing a change in metal status upon returning from dialysis Wednesday, pt's sister states" at first she was sleepy,when she woke up she was like that, couldn't stand up or nothing cause she goes to the bathroom by herself" reports pt is mostly independent " mostly she does everything by her self, she goes down by herself, I help her put her socks on, she does a lot of things by herself" resp even rapid, skin dry, flaky, lips dry, edema to lower extremities, noted pt on cardiac monitor, pulse ox, oxygen, noted pt soiled with stool, sr up x2, dialysis M, W, F, pt states " my leg hurts" noted wound to inner leg, family reports " she been having those sores for months!" pt rates pan to sores 8/10  "

## 2019-12-20 NOTE — ED PROVIDER NOTES
Encounter Date: 12/20/2019       History     Chief Complaint   Patient presents with    Weakness     EMS report family found pt sitting on the floor by her bed this morning, and is generally weak with episodes of confusion.     HPI  Review of patient's allergies indicates:  No Known Allergies  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    both legs    CHF (congestive heart failure)     Encounter for blood transfusion 03/2016    Gout     Hypertension     Mitral valve regurgitation     Osteoarthritis     Pancreatitis     Peripheral vascular disease     Peritoneal dialysis catheter in place     Pneumonia 09/09/2017    Renal failure      Past Surgical History:   Procedure Laterality Date    ACHILLES TENDON SURGERY Right     APPENDECTOMY      CARDIAC CATHETERIZATION  07/03/2017    CHOLECYSTECTOMY      heal surgery Right     HYSTERECTOMY      PARATHYROIDECTOMY      PARATHYROIDECTOMY  07/13/2017    PERITONEAL CATHETER INSERTION      RENAL BIOPSY      vocal cord nodule       Family History   Problem Relation Age of Onset    Heart disease Sister     Early death Sister         heart as baby    Heart disease Maternal Grandfather     Diabetes Mother     Hypertension Mother     Heart failure Mother     Heart disease Mother     Arthritis Mother     Diabetes Father     Early death Sister         infant     Social History     Tobacco Use    Smoking status: Current Some Day Smoker     Packs/day: 0.50     Years: 45.00     Pack years: 22.50    Smokeless tobacco: Never Used   Substance Use Topics    Alcohol use: No    Drug use: No     Review of Systems    Physical Exam     Initial Vitals [12/20/19 0511]   BP Pulse Resp Temp SpO2   (!) 145/90 (!) 112 (!) 22 98 °F (36.7 °C) 96 %      MAP       --         Physical Exam    ED Course   Procedures  Labs Reviewed   CBC W/ AUTO DIFFERENTIAL - Abnormal; Notable for the following components:       Result Value    RBC 3.90 (*)      Hemoglobin 10.8 (*)     Hematocrit 33.8 (*)     RDW 18.2 (*)     Immature Granulocytes 0.7 (*)     Immature Grans (Abs) 0.06 (*)     Lymph # 0.6 (*)     Gran% 86.9 (*)     Lymph% 7.3 (*)     All other components within normal limits   COMPREHENSIVE METABOLIC PANEL - Abnormal; Notable for the following components:    Chloride 87 (*)     BUN, Bld 43 (*)     Creatinine 5.7 (*)     Calcium 8.6 (*)     Albumin 3.0 (*)     Total Bilirubin 2.1 (*)     Alkaline Phosphatase 167 (*)     Anion Gap 25 (*)     eGFR if  7.9 (*)     eGFR if non  6.8 (*)     All other components within normal limits   CULTURE, BLOOD   CULTURE, BLOOD   LACTIC ACID, PLASMA   TROPONIN I   B-TYPE NATRIURETIC PEPTIDE        ECG Results          EKG 12-lead (In process)  Result time 12/20/19 06:53:24    In process by Interface, Lab In [facility] (12/20/19 06:53:24)                 Narrative:    Test Reason : R41.82,    Vent. Rate : 112 BPM     Atrial Rate : 250 BPM     P-R Int : 000 ms          QRS Dur : 088 ms      QT Int : 402 ms       P-R-T Axes : 000 -30 136 degrees     QTc Int : 548 ms    Undetermined rhythm  Left axis deviation  ST and T wave abnormality, consider inferolateral ischemia  Prolonged QT  Abnormal ECG  When compared with ECG of 20-DEC-2019 05:25,  Significant changes have occurred    Referred By: [email or web address]   SELF           Confirmed By:                             Imaging Results          X-Ray Chest AP Portable (Final result)  Result time 12/20/19 06:39:10   Procedure changed from X-Ray Chest 1 View     Final result by Alonso Bain MD (12/20/19 06:39:10)                 Narrative:    CLINICAL HISTORY:  74 years (1945) Female ams, weakness / hx of htn, chf    TECHNIQUE:  Portable AP radiograph the chest.    COMPARISON:  CTA most recently from October 24, 2019.    FINDINGS:  Perihilar pulmonary vascular prominence with increased central hilar  interstitial lung markings bilaterally (slightly  worse on the right)  suggesting mild pulmonary vascular edema. There is blunting of both  costophrenic angles consistent with a small right and trace left  pleural effusions and adjacent atelectasis. No pneumothorax is  identified. There is moderate cardiopericardial silhouette enlargement  with atheromatous calcifications at the aortic arch and in annular  metallic valve replacement. Osseous structures appear within normal  limits. The visualized upper abdomen is unremarkable.    IMPRESSION:  1. Cardiomegaly and findings suggestive of mild pulmonary edema.  2. Small right and trace left pleural effusion and adjacent  atelectasis.                  .            Electronically Signed by VALENTINA Rodriguez on 12/20/2019 7:05 AM                             CT Cervical Spine Without Contrast (Final result)  Result time 12/20/19 05:57:09    Final result by Nga Porter MD (12/20/19 05:57:09)                 Narrative:    CMS MANDATED QUALITY DATA - CT RADIATION 436. CT scans at this  facility utilize dose modulation, iterative reconstruction, and/or  weight based dosing when appropriate to reduce radiation dose to as  low as reasonably achievable.    Procedure:  CT CERVICAL SPINE WITHOUT CONTRAST  dated  12/20/2019 5:38  AM    CLINICAL HISTORY:   Female 74 years of age.   fall 74-year-old female  found by family slumped over next to her bed this morning.  Unclear if  sh she fell.    COMPARISON:  None    TECHNIQUE/FINDINGS:  Axial CT examination of the cervical spine was  performed. Axial, sagittal and coronal reformatted images were  generated.    The cervical alignment is anatomic with no fracture, subluxation or  facet dislocation.    There is no prevertebral soft tissue swelling.    There is no visible acquired high-grade stenosis or cord compression.    IMPRESSION:  No acute findings.    Electronically Signed by Nga Porter on 12/20/2019 6:14 AM                             CT Head Without Contrast (Final  result)  Result time 12/20/19 05:56:56    Final result by Nga Porter MD (12/20/19 05:56:56)                 Narrative:    CMS MANDATED QUALITY DATA - CT RADIATION 436. CT scans at this  facility utilize dose modulation, iterative reconstruction, and/or  weight based dosing when appropriate to reduce radiation dose to as  low as reasonably achievable.    PROCEDURE:    CT HEAD WITHOUT CONTRAST  dated  12/20/2019 5:38 AM    CLINICAL HISTORY:   Female 74 years of age.   Confusion/delirium,  altered LOC, unexplained 74-year-old female with history of CHF,  hypertension, renal failure on Monday Wednesday Friday dialysis who  presents with altered mental status.  The patient was found by family  slumped over.    TECHNIQUE: CT images were acquired from the foramen magnum to the  vertex. Intravenous contrast was not administered.    COMPARISON:  September 7, 2019    FINDINGS:    There is no intracranial hemorrhage, extra-axial fluid collection or  edema. Bilateral white matter low-attenuation is stable and compatible  with sequela of chronic ischemic disease, along with chronic lacunar  infarction of the right caudate. Ventricles, cisterns and sulci are  age-appropriate.  Mastoid air cells are clear.  Visualized paranasal  sinuses are clear.    Extracranial soft tissue is normal. The calvarium is intact.    IMPRESSION:    No acute findings. No change compared with the prior study.  Age-related atrophy, white matter change from chronic small vessel  ischemic disease, and chronic lacunar infarction of the right caudate.    Electronically Signed by Nga Porter on 12/20/2019 6:12 AM                                            Attending Attestation:             Attending ED Notes:   This 74-year-old dialysis patient presented with some altered mental status is probably septic.  Patient has a lactate of 2.6.  Oxygen saturations acceptable at 96%.  CBC has a white count of 8.5 and H&H of 10.8 and 33.8.  The patient's BUN  and creatinine is 43 and 5.7 respectively.  A CT scan of cervical spine was negative CT of the head showed chronic small-vessel disease.  Chest x-ray showed a right-sided infiltrate and pleural effusion.  During the ED course the patient was given vancomycin and Zosyn.  A consult was placed to Hospital Medicine and Dr. Ballesteros. will be admitting the patient to ICU.  Dr. Ingram, her nephrologist, is also consulted.                        Clinical Impression:       ICD-10-CM ICD-9-CM   1. AMS (altered mental status) R41.82 780.97   2. Pneumonia of right lung due to infectious organism, unspecified part of lung J18.9 483.8   3. Stage 5 chronic kidney disease on chronic dialysis N18.6 585.6    Z99.2 V45.11   4. Sepsis, due to unspecified organism, unspecified whether acute organ dysfunction present A41.9 038.9     995.91                             Kenji Sanford Jr., MD  12/20/19 0805

## 2019-12-20 NOTE — SUBJECTIVE & OBJECTIVE
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    both legs    CHF (congestive heart failure)     Encounter for blood transfusion 03/2016    Gout     Hypertension     Mitral valve regurgitation     Osteoarthritis     Pancreatitis     Peripheral vascular disease     Peritoneal dialysis catheter in place     Pneumonia 09/09/2017    Renal failure        Past Surgical History:   Procedure Laterality Date    ACHILLES TENDON SURGERY Right     APPENDECTOMY      CARDIAC CATHETERIZATION  07/03/2017    CHOLECYSTECTOMY      heal surgery Right     HYSTERECTOMY      PARATHYROIDECTOMY      PARATHYROIDECTOMY  07/13/2017    PERITONEAL CATHETER INSERTION      RENAL BIOPSY      vocal cord nodule         Review of patient's allergies indicates:  No Known Allergies    No current facility-administered medications on file prior to encounter.      Current Outpatient Medications on File Prior to Encounter   Medication Sig    aspirin 81 MG Chew Take 81 mg by mouth once daily.     calcium acetate (PHOSLO) 667 mg capsule Take 1,334 mg by mouth 3 (three) times daily with meals.    diltiaZEM (CARDIZEM CD) 180 MG 24 hr capsule Take 180 mg by mouth once daily.    fluticasone furoate-vilanterol (BREO) 100-25 mcg/dose diskus inhaler Inhale 1 puff into the lungs once daily. Controller    isosorbide mononitrate (IMDUR) 30 MG 24 hr tablet Take 30 mg by mouth once daily.    levETIRAcetam (KEPPRA) 500 MG Tab Take 500 mg by mouth every Mon, Wed, Fri. After diaylsis    losartan (COZAAR) 25 MG tablet Take 25 mg by mouth once daily.    magnesium oxide (MAG-OX) 400 mg (241.3 mg magnesium) tablet Take 400 mg by mouth once daily.    metoprolol succinate (TOPROL-XL) 50 MG 24 hr tablet Take 1 tablet (50 mg total) by mouth once daily. (Patient taking differently: Take 50 mg by mouth once daily. )    ondansetron (ZOFRAN-ODT) 4 MG TbDL Take 1 tablet (4 mg total) by mouth every 6 (six) hours as needed.     oxyCODONE-acetaminophen (PERCOCET) 5-325 mg per tablet Take 1 tablet by mouth daily as needed for Pain (with dialysis).     sevelamer HCl (RENAGEL) 800 MG Tab Take 1 tablet (800 mg total) by mouth 3 (three) times daily with meals.    traMADol (ULTRAM) 50 mg tablet Take 1 tablet (50 mg total) by mouth 3 (three) times a week. 3 TIMES A WEEK AS NEEDED DURING HEMODIALYSIS     Family History     Problem Relation (Age of Onset)    Arthritis Mother    Diabetes Mother, Father    Early death Sister, Sister    Heart disease Sister, Maternal Grandfather, Mother    Heart failure Mother    Hypertension Mother        Tobacco Use    Smoking status: Current Some Day Smoker     Packs/day: 0.50     Years: 45.00     Pack years: 22.50    Smokeless tobacco: Never Used   Substance and Sexual Activity    Alcohol use: No    Drug use: No    Sexual activity: Not on file     Review of Systems   Constitutional: Negative for chills, fatigue, fever and unexpected weight change.   HENT: Negative for ear pain, rhinorrhea, sneezing and sore throat.    Eyes: Negative for visual disturbance.   Respiratory: Negative for cough, chest tightness and shortness of breath.    Cardiovascular: Negative for chest pain.   Gastrointestinal: Negative for abdominal pain, constipation, diarrhea, nausea and vomiting.   Endocrine: Negative for polyuria.   Genitourinary: Negative for dysuria and hematuria.   Neurological: Negative for seizures and headaches.     Objective:     Vital Signs (Most Recent):  Temp: 98.4 °F (36.9 °C) (12/20/19 0800)  Pulse: 104 (12/20/19 0822)  Resp: (!) 24 (12/20/19 0822)  BP: (!) 143/63 (12/20/19 0800)  SpO2: 98 % (12/20/19 0822) Vital Signs (24h Range):  Temp:  [98 °F (36.7 °C)-98.4 °F (36.9 °C)] 98.4 °F (36.9 °C)  Pulse:  [102-155] 104  Resp:  [22-26] 24  SpO2:  [96 %-98 %] 98 %  BP: (137-145)/(63-98) 143/63     Weight: 59 kg (130 lb)  Body mass index is 21.63 kg/m².    Physical Exam   Constitutional: She is oriented to  person, place, and time. No distress.   Frail appearing   HENT:   Head: Normocephalic and atraumatic.   Right Ear: External ear normal.   Left Ear: External ear normal.   Nose: Nose normal.   Mouth/Throat: Oropharynx is clear and moist. No oropharyngeal exudate.   Eyes: Pupils are equal, round, and reactive to light. Conjunctivae and EOM are normal. Right eye exhibits no discharge. Left eye exhibits no discharge. No scleral icterus.   Neck: Normal range of motion. Neck supple. No thyromegaly present.   Cardiovascular: Normal rate, normal heart sounds and intact distal pulses. Exam reveals no gallop and no friction rub.   No murmur heard.  IRR, RUE fistula   Pulmonary/Chest: Effort normal. No respiratory distress. She has wheezes (Mild/Moderate bilaterally). She has no rales. She exhibits no tenderness.   Abdominal: Soft. Bowel sounds are normal. She exhibits no distension and no mass. There is no tenderness. There is no rebound and no guarding. No hernia.   Musculoskeletal: Normal range of motion. She exhibits no edema or tenderness.   Lymphadenopathy:     She has no cervical adenopathy.   Neurological: She is alert and oriented to person, place, and time.   Skin: Skin is warm. She is not diaphoretic.   Wound in left inner thigh, calciphylaxis lower extremity, neuropathy BL LE   Psychiatric: She has a normal mood and affect. Her behavior is normal. Judgment and thought content normal.   Nursing note and vitals reviewed.        CRANIAL NERVES     CN III, IV, VI   Pupils are equal, round, and reactive to light.  Extraocular motions are normal.        Significant Labs:   Blood Culture: No results for input(s): LABBLOO in the last 48 hours.  CBC:   Recent Labs   Lab 12/20/19  0610   WBC 8.53   HGB 10.8*   HCT 33.8*        CMP:   Recent Labs   Lab 12/20/19  0610      K 4.6   CL 87*   CO2 25   GLU 80   BUN 43*   CREATININE 5.7*   CALCIUM 8.6*   PROT 7.6   ALBUMIN 3.0*   BILITOT 2.1*   ALKPHOS 167*   AST 26    ALT 14   ANIONGAP 25*   EGFRNONAA 6.8*     Cardiac Markers:   Recent Labs   Lab 12/20/19 0610   BNP >4,500*     Troponin:   Recent Labs   Lab 12/20/19 0610   TROPONINI 0.100*       Significant Imaging:     X-Ray Chest AP Portable [[id number]] Collected: 12/20/19 0545   Order Status: Completed Updated: 12/20/19 0709   Narrative:     CLINICAL HISTORY:  74 years (1945) Female ams, weakness / hx of htn, chf    TECHNIQUE:  Portable AP radiograph the chest.    COMPARISON:  CTA most recently from October 24, 2019.    FINDINGS:  Perihilar pulmonary vascular prominence with increased central hilar  interstitial lung markings bilaterally (slightly worse on the right)  suggesting mild pulmonary vascular edema. There is blunting of both  costophrenic angles consistent with a small right and trace left  pleural effusions and adjacent atelectasis. No pneumothorax is  identified. There is moderate cardiopericardial silhouette enlargement  with atheromatous calcifications at the aortic arch and in annular  metallic valve replacement. Osseous structures appear within normal  limits. The visualized upper abdomen is unremarkable.    IMPRESSION:  1. Cardiomegaly and findings suggestive of mild pulmonary edema.  2. Small right and trace left pleural effusion and adjacent  atelectasis.         CT Cervical Spine Without Contrast [[id number]] Collected: 12/20/19 0538   Order Status: Completed Updated: 12/20/19 0619   Narrative:     CMS MANDATED QUALITY DATA - CT RADIATION 436. CT scans at this  facility utilize dose modulation, iterative reconstruction, and/or  weight based dosing when appropriate to reduce radiation dose to as  low as reasonably achievable.    Procedure:  CT CERVICAL SPINE WITHOUT CONTRAST  dated  12/20/2019 5:38  AM    CLINICAL HISTORY:   Female 74 years of age.   fall 74-year-old female  found by family slumped over next to her bed this morning.  Unclear if  sh she fell.    COMPARISON:  None    TECHNIQUE/FINDINGS:   Axial CT examination of the cervical spine was  performed. Axial, sagittal and coronal reformatted images were  generated.    The cervical alignment is anatomic with no fracture, subluxation or  facet dislocation.    There is no prevertebral soft tissue swelling.    There is no visible acquired high-grade stenosis or cord compression.    IMPRESSION:  No acute findings.         CT Head Without Contrast [[id number]] Collected: 12/20/19 0538   Order Status: Completed Updated: 12/20/19 0616   Narrative:     CMS MANDATED QUALITY DATA - CT RADIATION 436. CT scans at this  facility utilize dose modulation, iterative reconstruction, and/or  weight based dosing when appropriate to reduce radiation dose to as  low as reasonably achievable.    PROCEDURE:    CT HEAD WITHOUT CONTRAST  dated  12/20/2019 5:38 AM    CLINICAL HISTORY:   Female 74 years of age.   Confusion/delirium,  altered LOC, unexplained 74-year-old female with history of CHF,  hypertension, renal failure on Monday Wednesday Friday dialysis who  presents with altered mental status.  The patient was found by family  slumped over.    TECHNIQUE: CT images were acquired from the foramen magnum to the  vertex. Intravenous contrast was not administered.    COMPARISON:  September 7, 2019    FINDINGS:    There is no intracranial hemorrhage, extra-axial fluid collection or  edema. Bilateral white matter low-attenuation is stable and compatible  with sequela of chronic ischemic disease, along with chronic lacunar  infarction of the right caudate. Ventricles, cisterns and sulci are  age-appropriate.  Mastoid air cells are clear.  Visualized paranasal  sinuses are clear.    Extracranial soft tissue is normal. The calvarium is intact.    IMPRESSION:    No acute findings. No change compared with the prior study.  Age-related atrophy, white matter change from chronic small vessel  ischemic disease, and chronic lacunar infarction of the right caudate.

## 2019-12-20 NOTE — HPI
HPI per patient and per patient's sister was at bedside  74-year-old female history of hypertension, ESRD (MWF, Dr. Ingram), COPD on 3 L of home, AFib, seizures, PVD, HFrEF (45%, 7/2019) comes in for acute encephalopathy and generalized weakness.  Patient reports that she has been feeling well without acute signs of illness.  She woke up early this morning and attempted to use the bathroom.  When she got out of bed, she slid down to the floor.  Denies any chest pain, shortness of breath, nausea, vomiting, tongue biting, loss of consciousness, head trauma.  When patient was on the floor she called her sister whom she lives with for assistance.  Her sister  attempted to lift her but had difficulty.  They became concerned and called EMS.  Per sister who is at bedside who does not live with patient reports that patient has been more confused and not making sense since this past Tuesday.  Reports that patient has been having a lot of pain and anxiety due to her calciphylaxis.  Patient was started on alprazolam on Tuesday and took a dose of that.  Has been having confusion and abnormal behavior since.  Has been also taking Toradol and Percocet at home for her pain. Family concerns for her confusion since that time.  Family reports that patient has not had any signs of acute illness including no complaints of coughing, chest pain, abdominal pain, diarrhea, constipation, any focal pain.    In the ED, patient was started on vancomycin and Zosyn.  During my interview, patient was alert oriented x4 and responding appropriately to questions and commands.

## 2019-12-20 NOTE — ASSESSMENT & PLAN NOTE
Likely 2/2 medication induced. Recently started on alprazolam. Also on tramadol and Percocet at home  Lives at home with her sister  Patient acute cephalopathy appears to resolve in the ED  No acute signs of infection including no fever or leukocytosis.  No reported complaints from patient or from family at home prior to admission.  Lactic acidosis of unknown significance in the setting of ESRD.  Repeat lactic acid.  Continue home meds.  Holding sedating or mind-altering medications.  Telemetry monitoring for new arrhythmias  Ammonia pending  US carotid - Calcified plaque in the carotid bulbs and proximal internal carotid arteries causing 50-69% stenosis on the right and less than 50% stenosis on the left by velocities and ratios with bilateral antegrade vertebral artery flow. (3/2019)  PT/OT ordered  Wound care consult  Will need dialysis today

## 2019-12-20 NOTE — H&P
[facility] Medicine  History & Physical    Patient Name: Griselda Neely  MRN: [id number]  Admission Date: 12/20/2019  Attending Physician: Jhoan Fuentes MD   Primary Care Provider: Kalie Neely MD         Patient information was obtained from patient, past medical records and ER records.     Subjective:     Principal Problem:Acute encephalopathy    Chief Complaint:   Chief Complaint   Patient presents with    Weakness     EMS report family found pt sitting on the floor by her bed this morning, and is generally weak with episodes of confusion.        HPI: HPI per patient and per patient's sister was at bedside  74-year-old female history of hypertension, ESRD (MWF, Dr. Ingram), COPD on 3 L of home, AFib, seizures, PVD, HFrEF (45%, 7/2019) comes in for acute encephalopathy and generalized weakness.  Patient reports that she has been feeling well without acute signs of illness.  She woke up early this morning and attempted to use the bathroom.  When she got out of bed, she slid down to the floor.  Denies any chest pain, shortness of breath, nausea, vomiting, tongue biting, loss of consciousness, head trauma.  When patient was on the floor she called her sister whom she lives with for assistance.  Her sister  attempted to lift her but had difficulty.  They became concerned and called EMS.  Per sister who is at bedside who does not live with patient reports that patient has been more confused and not making sense since this past Tuesday.  Reports that patient has been having a lot of pain and anxiety due to her calciphylaxis.  Patient was started on alprazolam on Tuesday and took a dose of that.  Has been having confusion and abnormal behavior since.  Has been also taking Toradol and Percocet at home for her pain. Family concerns for her confusion since that time.  Family reports that patient has not had any signs of acute illness including no complaints of coughing, chest  pain, abdominal pain, diarrhea, constipation, any focal pain.    In the ED, patient was started on vancomycin and Zosyn.  During my interview, patient was alert oriented x4 and responding appropriately to questions and commands.    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    both legs    CHF (congestive heart failure)     Encounter for blood transfusion 03/2016    Gout     Hypertension     Mitral valve regurgitation     Osteoarthritis     Pancreatitis     Peripheral vascular disease     Peritoneal dialysis catheter in place     Pneumonia 09/09/2017    Renal failure        Past Surgical History:   Procedure Laterality Date    ACHILLES TENDON SURGERY Right     APPENDECTOMY      CARDIAC CATHETERIZATION  07/03/2017    CHOLECYSTECTOMY      heal surgery Right     HYSTERECTOMY      PARATHYROIDECTOMY      PARATHYROIDECTOMY  07/13/2017    PERITONEAL CATHETER INSERTION      RENAL BIOPSY      vocal cord nodule         Review of patient's allergies indicates:  No Known Allergies    No current facility-administered medications on file prior to encounter.      Current Outpatient Medications on File Prior to Encounter   Medication Sig    aspirin 81 MG Chew Take 81 mg by mouth once daily.     calcium acetate (PHOSLO) 667 mg capsule Take 1,334 mg by mouth 3 (three) times daily with meals.    diltiaZEM (CARDIZEM CD) 180 MG 24 hr capsule Take 180 mg by mouth once daily.    fluticasone furoate-vilanterol (BREO) 100-25 mcg/dose diskus inhaler Inhale 1 puff into the lungs once daily. Controller    isosorbide mononitrate (IMDUR) 30 MG 24 hr tablet Take 30 mg by mouth once daily.    levETIRAcetam (KEPPRA) 500 MG Tab Take 500 mg by mouth every Mon, Wed, Fri. After diaylsis    losartan (COZAAR) 25 MG tablet Take 25 mg by mouth once daily.    magnesium oxide (MAG-OX) 400 mg (241.3 mg magnesium) tablet Take 400 mg by mouth once daily.    metoprolol succinate (TOPROL-XL) 50 MG  24 hr tablet Take 1 tablet (50 mg total) by mouth once daily. (Patient taking differently: Take 50 mg by mouth once daily. )    ondansetron (ZOFRAN-ODT) 4 MG TbDL Take 1 tablet (4 mg total) by mouth every 6 (six) hours as needed.    oxyCODONE-acetaminophen (PERCOCET) 5-325 mg per tablet Take 1 tablet by mouth daily as needed for Pain (with dialysis).     sevelamer HCl (RENAGEL) 800 MG Tab Take 1 tablet (800 mg total) by mouth 3 (three) times daily with meals.    traMADol (ULTRAM) 50 mg tablet Take 1 tablet (50 mg total) by mouth 3 (three) times a week. 3 TIMES A WEEK AS NEEDED DURING HEMODIALYSIS     Family History     Problem Relation (Age of Onset)    Arthritis Mother    Diabetes Mother, Father    Early death Sister, Sister    Heart disease Sister, Maternal Grandfather, Mother    Heart failure Mother    Hypertension Mother        Tobacco Use    Smoking status: Current Some Day Smoker     Packs/day: 0.50     Years: 45.00     Pack years: 22.50    Smokeless tobacco: Never Used   Substance and Sexual Activity    Alcohol use: No    Drug use: No    Sexual activity: Not on file     Review of Systems   Constitutional: Negative for chills, fatigue, fever and unexpected weight change.   HENT: Negative for ear pain, rhinorrhea, sneezing and sore throat.    Eyes: Negative for visual disturbance.   Respiratory: Negative for cough, chest tightness and shortness of breath.    Cardiovascular: Negative for chest pain.   Gastrointestinal: Negative for abdominal pain, constipation, diarrhea, nausea and vomiting.   Endocrine: Negative for polyuria.   Genitourinary: Negative for dysuria and hematuria.   Neurological: Negative for seizures and headaches.     Objective:     Vital Signs (Most Recent):  Temp: 98.4 °F (36.9 °C) (12/20/19 0800)  Pulse: 104 (12/20/19 0822)  Resp: (!) 24 (12/20/19 0822)  BP: (!) 143/63 (12/20/19 0800)  SpO2: 98 % (12/20/19 0822) Vital Signs (24h Range):  Temp:  [98 °F (36.7 °C)-98.4 °F (36.9 °C)]  98.4 °F (36.9 °C)  Pulse:  [102-155] 104  Resp:  [22-26] 24  SpO2:  [96 %-98 %] 98 %  BP: (137-145)/(63-98) 143/63     Weight: 59 kg (130 lb)  Body mass index is 21.63 kg/m².    Physical Exam   Constitutional: She is oriented to person, place, and time. No distress.   Frail appearing   HENT:   Head: Normocephalic and atraumatic.   Right Ear: External ear normal.   Left Ear: External ear normal.   Nose: Nose normal.   Mouth/Throat: Oropharynx is clear and moist. No oropharyngeal exudate.   Eyes: Pupils are equal, round, and reactive to light. Conjunctivae and EOM are normal. Right eye exhibits no discharge. Left eye exhibits no discharge. No scleral icterus.   Neck: Normal range of motion. Neck supple. No thyromegaly present.   Cardiovascular: Normal rate, normal heart sounds and intact distal pulses. Exam reveals no gallop and no friction rub.   No murmur heard.  IRR, RUE fistula   Pulmonary/Chest: Effort normal. No respiratory distress. She has wheezes (Mild/Moderate bilaterally). She has no rales. She exhibits no tenderness.   Abdominal: Soft. Bowel sounds are normal. She exhibits no distension and no mass. There is no tenderness. There is no rebound and no guarding. No hernia.   Musculoskeletal: Normal range of motion. She exhibits no edema or tenderness.   Lymphadenopathy:     She has no cervical adenopathy.   Neurological: She is alert and oriented to person, place, and time.   Skin: Skin is warm. She is not diaphoretic.   Wound in left inner thigh, calciphylaxis lower extremity, neuropathy BL LE   Psychiatric: She has a normal mood and affect. Her behavior is normal. Judgment and thought content normal.   Nursing note and vitals reviewed.        CRANIAL NERVES     CN III, IV, VI   Pupils are equal, round, and reactive to light.  Extraocular motions are normal.        Significant Labs:   Blood Culture: No results for input(s): LABBLOO in the last 48 hours.  CBC:   Recent Labs   Lab 12/20/19  0610   WBC 8.53    HGB 10.8*   HCT 33.8*        CMP:   Recent Labs   Lab 12/20/19 0610      K 4.6   CL 87*   CO2 25   GLU 80   BUN 43*   CREATININE 5.7*   CALCIUM 8.6*   PROT 7.6   ALBUMIN 3.0*   BILITOT 2.1*   ALKPHOS 167*   AST 26   ALT 14   ANIONGAP 25*   EGFRNONAA 6.8*     Cardiac Markers:   Recent Labs   Lab 12/20/19 0610   BNP >4,500*     Troponin:   Recent Labs   Lab 12/20/19 0610   TROPONINI 0.100*       Significant Imaging:     X-Ray Chest AP Portable [[id number]] Collected: 12/20/19 0545   Order Status: Completed Updated: 12/20/19 0709   Narrative:     CLINICAL HISTORY:  74 years (1945) Female ams, weakness / hx of htn, chf    TECHNIQUE:  Portable AP radiograph the chest.    COMPARISON:  CTA most recently from October 24, 2019.    FINDINGS:  Perihilar pulmonary vascular prominence with increased central hilar  interstitial lung markings bilaterally (slightly worse on the right)  suggesting mild pulmonary vascular edema. There is blunting of both  costophrenic angles consistent with a small right and trace left  pleural effusions and adjacent atelectasis. No pneumothorax is  identified. There is moderate cardiopericardial silhouette enlargement  with atheromatous calcifications at the aortic arch and in annular  metallic valve replacement. Osseous structures appear within normal  limits. The visualized upper abdomen is unremarkable.    IMPRESSION:  1. Cardiomegaly and findings suggestive of mild pulmonary edema.  2. Small right and trace left pleural effusion and adjacent  atelectasis.         CT Cervical Spine Without Contrast [[id number]] Collected: 12/20/19 0538   Order Status: Completed Updated: 12/20/19 0619   Narrative:     CMS MANDATED QUALITY DATA - CT RADIATION 436. CT scans at this  facility utilize dose modulation, iterative reconstruction, and/or  weight based dosing when appropriate to reduce radiation dose to as  low as reasonably achievable.    Procedure:  CT CERVICAL SPINE WITHOUT CONTRAST   dated  12/20/2019 5:38  AM    CLINICAL HISTORY:   Female 74 years of age.   fall 74-year-old female  found by family slumped over next to her bed this morning.  Unclear if  sh she fell.    COMPARISON:  None    TECHNIQUE/FINDINGS:  Axial CT examination of the cervical spine was  performed. Axial, sagittal and coronal reformatted images were  generated.    The cervical alignment is anatomic with no fracture, subluxation or  facet dislocation.    There is no prevertebral soft tissue swelling.    There is no visible acquired high-grade stenosis or cord compression.    IMPRESSION:  No acute findings.         CT Head Without Contrast [[id number]] Collected: 12/20/19 0538   Order Status: Completed Updated: 12/20/19 0616   Narrative:     CMS MANDATED QUALITY DATA - CT RADIATION 436. CT scans at this  facility utilize dose modulation, iterative reconstruction, and/or  weight based dosing when appropriate to reduce radiation dose to as  low as reasonably achievable.    PROCEDURE:    CT HEAD WITHOUT CONTRAST  dated  12/20/2019 5:38 AM    CLINICAL HISTORY:   Female 74 years of age.   Confusion/delirium,  altered LOC, unexplained 74-year-old female with history of CHF,  hypertension, renal failure on Monday Wednesday Friday dialysis who  presents with altered mental status.  The patient was found by family  slumped over.    TECHNIQUE: CT images were acquired from the foramen magnum to the  vertex. Intravenous contrast was not administered.    COMPARISON:  September 7, 2019    FINDINGS:    There is no intracranial hemorrhage, extra-axial fluid collection or  edema. Bilateral white matter low-attenuation is stable and compatible  with sequela of chronic ischemic disease, along with chronic lacunar  infarction of the right caudate. Ventricles, cisterns and sulci are  age-appropriate.  Mastoid air cells are clear.  Visualized paranasal  sinuses are clear.    Extracranial soft tissue is normal. The calvarium is  intact.    IMPRESSION:    No acute findings. No change compared with the prior study.  Age-related atrophy, white matter change from chronic small vessel  ischemic disease, and chronic lacunar infarction of the right caudate.         Assessment/Plan:     Active Hospital Problems    Diagnosis    *Acute encephalopathy    Generalized weakness    Lactic acidosis    Seizures    HLD (hyperlipidemia)    PVD (peripheral vascular disease)    Congestive heart failure    Chronic respiratory failure     3 L O2 at home      Pulmonary hypertension    Atrial fibrillation    Calciphylaxis    ESRD (end stage renal disease) on HD M,W, F    Chronic obstructive pulmonary disease    Tobacco dependence    HTN (hypertension)       * Acute encephalopathy  Likely 2/2 medication induced. Recently started on alprazolam. Also on tramadol and Percocet at home  Lives at home with her sister  Patient acute cephalopathy appears to resolve in the ED  No acute signs of infection including no fever or leukocytosis.  No reported complaints from patient or from family at home prior to admission.  Lactic acidosis of unknown significance in the setting of ESRD.  Repeat lactic acid.  Continue home meds.  Holding sedating or mind-altering medications.  Telemetry monitoring for new arrhythmias  Ammonia pending  US carotid - Calcified plaque in the carotid bulbs and proximal internal carotid arteries causing 50-69% stenosis on the right and less than 50% stenosis on the left by velocities and ratios with bilateral antegrade vertebral artery flow. (3/2019)  PT/OT ordered  Wound care consult  Will need dialysis today    VTE Risk Mitigation (From admission, onward)         Ordered     enoxaparin injection 30 mg  Daily      12/20/19 0859     IP VTE HIGH RISK PATIENT  Once      12/20/19 0859                   Jhoan Fuentes MD  Department of Hospital Medicine   [facility]  Date of service: 12/20/2019 11:48 AM

## 2019-12-20 NOTE — CONSULTS
Consult Note  Nephrology    Griselda Neely  female  74 y.o.  Consult Requested By: Jhoan Fuentes MD  Reason for Consult:  ESRD  SUBJECTIVE:     History of Present Illness:  Mrs. Griselda Neely is a 74-year-old woman with multiple medical problems including COPD, ESRD, coronary artery disease, post AVR and calciphylaxis.  She comes into the hospital now complaining of pain in her left leg where she has a calciphylaxis wound.  She had it debrided on Tuesday the 3 days prior to hospitalization and has had worsening pain ever since.  She was found to be confused by her family this morning and was brought to the emergency department.  Of note, she had been started on Xanax when she had her wound care.  She denies shortness of breath to me but she has pulmonary edema present on her chest x-ray.  Nephrology is being consulted to assist with her ESRD management.    Review Of Systems:  GEN:  Confused but less so now.  No fever, chills, night sweats, decreased intake  HEENT: No blurry vision, glasses, floaters, hearing defects, sore throat  CV:  No CP, palpitations, edema, ARGUELLO, orthopnea, PND  PULM:  No Cough, SOB, hemoptysis, wheezing   GI:  No nausea, vomiting, constipation, diarrhea, melena, hematochezia, abdominal pain  :  Very little urine flow.  NEURO: No LOC, seizure activity, dizziness  SKIN:  Left leg wound with severe pain.  MS:  No arthralgias, joint swelling, redness or warmth    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    both legs    CHF (congestive heart failure)     Encounter for blood transfusion 03/2016    Gout     Hypertension     Mitral valve regurgitation     Osteoarthritis     Pancreatitis     Peripheral vascular disease     Peritoneal dialysis catheter in place     Pneumonia 09/09/2017    Renal failure      Past Surgical History:   Procedure Laterality Date    ACHILLES TENDON SURGERY Right     APPENDECTOMY      CARDIAC CATHETERIZATION   07/03/2017    CHOLECYSTECTOMY      heal surgery Right     HYSTERECTOMY      PARATHYROIDECTOMY      PARATHYROIDECTOMY  07/13/2017    PERITONEAL CATHETER INSERTION      RENAL BIOPSY      vocal cord nodule       Family History   Problem Relation Age of Onset    Heart disease Sister     Early death Sister         heart as baby    Heart disease Maternal Grandfather     Diabetes Mother     Hypertension Mother     Heart failure Mother     Heart disease Mother     Arthritis Mother     Diabetes Father     Early death Sister         infant     Social History     Tobacco Use    Smoking status: Current Some Day Smoker     Packs/day: 0.50     Years: 45.00     Pack years: 22.50    Smokeless tobacco: Never Used   Substance Use Topics    Alcohol use: No    Drug use: No      Review of patient's allergies indicates:  No Known Allergies   Prior to Admission medications    Medication Sig Start Date End Date Taking? Authorizing Provider   aspirin 81 MG Chew Take 81 mg by mouth once daily.    Yes Historical Provider, MD   calcium acetate (PHOSLO) 667 mg capsule Take 1,334 mg by mouth 3 (three) times daily with meals.   Yes Historical Provider, MD   diltiaZEM (CARDIZEM CD) 180 MG 24 hr capsule Take 180 mg by mouth once daily.   Yes Historical Provider, MD   fluticasone furoate-vilanterol (BREO) 100-25 mcg/dose diskus inhaler Inhale 1 puff into the lungs once daily. Controller   Yes Historical Provider, MD   isosorbide mononitrate (IMDUR) 30 MG 24 hr tablet Take 30 mg by mouth once daily.   Yes Historical Provider, MD   losartan (COZAAR) 25 MG tablet Take 25 mg by mouth once daily.   Yes Historical Provider, MD   magnesium oxide (MAG-OX) 400 mg (241.3 mg magnesium) tablet Take 400 mg by mouth once daily.   Yes Historical Provider, MD   metoprolol succinate (TOPROL-XL) 50 MG 24 hr tablet Take 1 tablet (50 mg total) by mouth once daily.  Patient taking differently: Take 50 mg by mouth once daily.  8/10/19 8/9/20 Yes  Daren Lea MD   levETIRAcetam (KEPPRA) 500 MG Tab Take 500 mg by mouth every Mon, Wed, Fri. After diaylsis    Historical Provider, MD   ondansetron (ZOFRAN-ODT) 4 MG TbDL Take 1 tablet (4 mg total) by mouth every 6 (six) hours as needed. 10/3/19   Kalie Neely MD   oxyCODONE-acetaminophen (PERCOCET) 5-325 mg per tablet Take 1 tablet by mouth daily as needed for Pain (with dialysis).     Historical Provider, MD   sevelamer HCl (RENAGEL) 800 MG Tab Take 1 tablet (800 mg total) by mouth 3 (three) times daily with meals. 10/1/19 10/31/19  Jhoan Fuentes MD   traMADol (ULTRAM) 50 mg tablet Take 1 tablet (50 mg total) by mouth 3 (three) times a week. 3 TIMES A WEEK AS NEEDED DURING HEMODIALYSIS 10/4/19   Kalie Neely MD              aspirin  81 mg Oral Daily    calcium acetate  1,334 mg Oral TID WM    diltiaZEM  180 mg Oral Daily    enoxaparin  30 mg Subcutaneous Daily    fluticasone furoate-vilanterol  1 puff Inhalation Daily    isosorbide mononitrate  30 mg Oral QHS    levETIRAcetam  500 mg Oral Every Mon, Wed, Fri    losartan  25 mg Oral Daily    metoprolol succinate  50 mg Oral Daily    polyethylene glycol  17 g Oral Daily    sevelamer carbonate  800 mg Oral TID WM    sodium thiosulfate  25 g Intravenous Every Mon, Wed, Fri     acetaminophen, acetaminophen, albuterol-ipratropium, dextrose 50%, dextrose 50%, glucagon (human recombinant), glucose, glucose, magnesium oxide, magnesium oxide, melatonin, ondansetron, potassium chloride 10%, potassium chloride 10%, potassium chloride 10%, potassium, sodium phosphates, potassium, sodium phosphates, potassium, sodium phosphates, sodium chloride 0.9%       OBJECTIVE:     Vital Signs (Most Recent)  Temp: 97.8 °F (36.6 °C) (12/20/19 1020)  Pulse: 92 (12/20/19 1030)  Resp: 20 (12/20/19 1030)  BP: 133/85 (12/20/19 1030)  SpO2: 97 % (12/20/19 0932)    Vital Signs Range (Last 24H):  Temp:  [97.8 °F (36.6 °C)-98.4 °F (36.9 °C)]   Pulse:  []    Resp:  [20-31]   BP: (127-145)/(63-98)   SpO2:  [93 %-99 %]     Physical Exam:  General: Awake and alert. In distress from her pain.  HEENT: No scleral icterus, MM moist.   NECK:  JVD. Supple,  No swelling, No masses.  CV:  RRR without murmurs, rubs, gallops.  PULM:  Few crackles and rhonchi but mainly clear.  ABD:  Soft, nl BS, NT, ND.  EXTR:  2+ edema.   NEURO: Grossly Intact.  SKIN:  In her thigh wound bandaged but very tender.  MS:  No joint effusions.   PSYCH: Normal Mood.    Laboratory:  Recent Labs   Lab 12/20/19  0610      K 4.6   CL 87*   CO2 25   BUN 43*   CREATININE 5.7*   GLU 80   CALCIUM 8.6*       Recent Labs   Lab 12/20/19  0610   WBC 8.53   HGB 10.8*   HCT 33.8*          Active Hospital Problems    Diagnosis  POA    *Acute encephalopathy [G93.40]  Yes    Seizures [R56.9]  Yes     Chronic    HLD (hyperlipidemia) [E78.5]  Yes     Chronic    PVD (peripheral vascular disease) [I73.9]  Yes     Chronic    Generalized weakness [R53.1]  Yes    Congestive heart failure [I50.9]  Yes     Chronic    Lactic acidosis [E87.2]  Yes    Chronic respiratory failure [J96.10]  Yes     Chronic     3 L O2 at home      Pulmonary hypertension [I27.20]  Yes     Chronic    Atrial fibrillation [I48.91]  Yes     Chronic    Calciphylaxis [E83.59]  Yes     Chronic    ESRD (end stage renal disease) on HD M,W, F [N18.6]  Yes     Chronic    Chronic obstructive pulmonary disease [J44.9]  Yes     Chronic    Tobacco dependence [F17.200]  Yes     Chronic    HTN (hypertension) [I10]  Yes     Chronic      Resolved Hospital Problems   No resolved problems to display.       ASSESSMENT/PLAN:     1.  Altered mental status  Suspect this is from medications  Improved now  Monitor mental status     2.  ESRD  Mild volume overload and will ultrafilter  No electrolyte issues  Plan dialysis on MWF schedule     3.  Calciphylaxis  She is post parathyroidectomy for calciphylaxis  Continue sodium thiosulfate with  dialysis  Calcium is okay but follow-up phosphorus     5.  Anemia  At ESRD goal  No need ESAs with dialysis     Thank you Dr. Fuentes for allowing us to share in Mrs. Neely' care.  We will follow-up with you while she is here.     Joseph Ingram M.D.

## 2019-12-20 NOTE — PLAN OF CARE
12/20/19 0939   CORTEZ Message   Medicare Outpatient and Observation Notification regarding financial responsibility Signed/date by patient/caregiver;Explained to patient/caregiver;Given to patient/caregiver   Date CORTEZ was signed 12/20/19   Time CORTEZ was signed 0938

## 2019-12-20 NOTE — PROGRESS NOTES
VANCOMYCIN PHARMACOKINETIC NOTE:  Vancomycin Day # 1    Objective/Assessment:    Diagnosis/Indication for Vancomycin: Bacteremia    74 y.o., female; Actual Body Weight = 59 kg (130 lb).    The patient has the following labs:     12/20/2019 Estimated Creatinine Clearance: 7.9 mL/min (A) (based on SCr of 5.7 mg/dL (H)). Lab Results   Component Value Date    BUN 43 (H) 12/20/2019       Lab Results   Component Value Date    WBC 8.53 12/20/2019          Patient is receiving hemodialysis therapy.        Plan for Dialysis Patient:  Give first dose of IV vancomycin. (1000 mg IV)   Vancomycin dose = 19.9 mg/kg actual body weight    Will follow random vancomycin levels and redose when serum vancomycin level decreases to 15-20 mcg/mL.  Random vancomycin level has been ordered on 12/21 @06:00, 23 hrs after dose #1.    Thank you for allowing us to participate in this patient's care.     Dario Sargent 12/20/2019 7:05 AM  Department of Pharmacy  Ext [phone]

## 2019-12-20 NOTE — ED PROVIDER NOTES
Encounter Date: 12/20/2019     History     Chief Complaint   Patient presents with    Weakness     EMS report family found pt sitting on the floor by her bed this morning, and is generally weak with episodes of confusion.     HPI   74-year-old female with history of CHF, hypertension, renal failure on Monday Wednesday Friday dialysis who presents with altered mental status.  The patient was found by family slumped over next to her bed this morning.  Unclear if she fell.  No fevers. The pt is usually oriented at baseline per report, but is more confused this morning. She has no focal complaints on history. Unknown when her last HD was.    On further history from family at bedside, the pt is normally able to perform ADLs unassisted. On Wednesday prior to dialysis she was acting her normal self.  After getting home from dialysis Wednesday she has remained altered since.  No acute change in behavior or complaints today compared to yesterday.  Her sister notes that the patient sometimes has altered mental status when she takes her anxiety medication (uncertain what this is).    Review of patient's allergies indicates:  No Known Allergies  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    both legs    CHF (congestive heart failure)     Encounter for blood transfusion 03/2016    Gout     Hypertension     Mitral valve regurgitation     Osteoarthritis     Pancreatitis     Peripheral vascular disease     Peritoneal dialysis catheter in place     Pneumonia 09/09/2017    Renal failure      Past Surgical History:   Procedure Laterality Date    ACHILLES TENDON SURGERY Right     APPENDECTOMY      CARDIAC CATHETERIZATION  07/03/2017    CHOLECYSTECTOMY      heal surgery Right     HYSTERECTOMY      PARATHYROIDECTOMY      PARATHYROIDECTOMY  07/13/2017    PERITONEAL CATHETER INSERTION      RENAL BIOPSY      vocal cord nodule       Family History   Problem Relation Age of Onset     Heart disease Sister     Early death Sister         heart as baby    Heart disease Maternal Grandfather     Diabetes Mother     Hypertension Mother     Heart failure Mother     Heart disease Mother     Arthritis Mother     Diabetes Father     Early death Sister         infant     Social History     Tobacco Use    Smoking status: Current Some Day Smoker     Packs/day: 0.50     Years: 45.00     Pack years: 22.50    Smokeless tobacco: Never Used   Substance Use Topics    Alcohol use: No    Drug use: No     Review of Systems   Unable to perform ROS: Mental status change       Physical Exam     Initial Vitals [12/20/19 0511]   BP Pulse Resp Temp SpO2   (!) 145/90 (!) 112 (!) 22 98 °F (36.7 °C) 96 %      MAP       --         Physical Exam    Constitutional: She appears well-developed and well-nourished. She is not diaphoretic.   Sitting up, alert.   HENT:   Head: Normocephalic and atraumatic.   Eyes: EOM are normal. Pupils are equal, round, and reactive to light. Right eye exhibits no discharge. Left eye exhibits no discharge.   Neck: Normal range of motion. Neck supple.   Cardiovascular: Regular rhythm and normal heart sounds. Exam reveals no gallop and no friction rub.    No murmur heard.  tachycardia   Pulmonary/Chest: Breath sounds normal. No respiratory distress. She has no wheezes. She has no rhonchi. She has no rales.   Abdominal: Soft. She exhibits no distension. There is no tenderness. There is no rebound and no guarding.   Musculoskeletal: She exhibits no edema or tenderness.   Neurological: She is alert.   Oriented to name and location, does not answer year. She moves all 4 extremities, intermittently follows commands.   Skin: Skin is warm and dry.   3 separate chronic appearing ulcers each approximately 1cm in length present on the left inner thigh with white drainage, demarcated boarders and no surrounding erythema or indurance.         ED Course   Procedures  Labs Reviewed   CULTURE, BLOOD    CULTURE, BLOOD   CBC W/ AUTO DIFFERENTIAL   COMPREHENSIVE METABOLIC PANEL   LACTIC ACID, PLASMA   TROPONIN I   B-TYPE NATRIURETIC PEPTIDE   URINALYSIS, REFLEX TO URINE CULTURE          Imaging Results    None          Medical Decision Making:   Initial Assessment:   74-year-old female with history of ESRD on Monday Wednesday Friday dialysis, CHF, hypertension presents with altered mental status  Vital signs notable for hypertension, tachycardia, tachypnea.  Will obtain a CT head scan to rule out intracranial injury from a possible fall.  Broad workup in place including lactic acid, blood cultures, cardiac workup.  She is typically on home O2, and is satting at low 90s without oxygen.  Given loculations and infiltrates on CXR, we did start broad spectrum abx Vanc and Zosyn. Pending workup, expected admission. Further care signed off to oncoming team.    Chente Millan MD  Resident, PGY-3  12/20/2019 5:29 AM  Differential Diagnosis:   ACS, electrolyte derangement, sepsis, intracranial injury                                 Clinical Impression:       ICD-10-CM ICD-9-CM   1. AMS (altered mental status) R41.82 780.97                             Chente Millan MD  Resident  12/20/19 0610

## 2019-12-21 VITALS
WEIGHT: 130.88 LBS | HEART RATE: 61 BPM | DIASTOLIC BLOOD PRESSURE: 67 MMHG | RESPIRATION RATE: 20 BRPM | SYSTOLIC BLOOD PRESSURE: 106 MMHG | HEIGHT: 65 IN | BODY MASS INDEX: 21.81 KG/M2 | TEMPERATURE: 97 F | OXYGEN SATURATION: 100 %

## 2019-12-21 PROBLEM — R53.1 GENERALIZED WEAKNESS: Status: RESOLVED | Noted: 2019-12-20 | Resolved: 2019-12-21

## 2019-12-21 PROBLEM — G93.40 ACUTE ENCEPHALOPATHY: Status: RESOLVED | Noted: 2019-08-06 | Resolved: 2019-12-21

## 2019-12-21 PROBLEM — E87.20 LACTIC ACIDOSIS: Status: RESOLVED | Noted: 2019-10-19 | Resolved: 2019-12-21

## 2019-12-21 LAB
ANION GAP SERPL CALC-SCNC: 24 MMOL/L (ref 8–16)
BUN SERPL-MCNC: 34 MG/DL (ref 8–23)
CALCIUM SERPL-MCNC: 8.5 MG/DL (ref 8.7–10.5)
CHLORIDE SERPL-SCNC: 92 MMOL/L (ref 95–110)
CO2 SERPL-SCNC: 23 MMOL/L (ref 23–29)
CREAT SERPL-MCNC: 4.3 MG/DL (ref 0.5–1.4)
ERYTHROCYTE [DISTWIDTH] IN BLOOD BY AUTOMATED COUNT: 17.8 % (ref 11.5–14.5)
EST. GFR  (AFRICAN AMERICAN): 11.1 ML/MIN/1.73 M^2
EST. GFR  (NON AFRICAN AMERICAN): 9.6 ML/MIN/1.73 M^2
GLUCOSE SERPL-MCNC: 135 MG/DL (ref 70–110)
GLUCOSE SERPL-MCNC: 143 MG/DL (ref 70–110)
GLUCOSE SERPL-MCNC: 150 MG/DL (ref 70–110)
HCT VFR BLD AUTO: 33.2 % (ref 37–48.5)
HCT VFR BLD AUTO: 34.1 % (ref 37–48.5)
HGB BLD-MCNC: 10.9 G/DL (ref 12–16)
HGB BLD-MCNC: 10.9 G/DL (ref 12–16)
MAGNESIUM SERPL-MCNC: 1.8 MG/DL (ref 1.6–2.6)
MCH RBC QN AUTO: 27.4 PG (ref 27–31)
MCHC RBC AUTO-ENTMCNC: 32 G/DL (ref 32–36)
MCV RBC AUTO: 86 FL (ref 82–98)
OB PNL GAST: POSITIVE
PHOSPHATE SERPL-MCNC: 4.6 MG/DL (ref 2.7–4.5)
PLATELET # BLD AUTO: 174 K/UL (ref 150–350)
PMV BLD AUTO: 10 FL (ref 9.2–12.9)
POTASSIUM SERPL-SCNC: 4.2 MMOL/L (ref 3.5–5.1)
RBC # BLD AUTO: 3.98 M/UL (ref 4–5.4)
SODIUM SERPL-SCNC: 139 MMOL/L (ref 136–145)
WBC # BLD AUTO: 10.18 K/UL (ref 3.9–12.7)

## 2019-12-21 PROCEDURE — 80048 BASIC METABOLIC PNL TOTAL CA: CPT

## 2019-12-21 PROCEDURE — 83735 ASSAY OF MAGNESIUM: CPT

## 2019-12-21 PROCEDURE — 27000221 HC OXYGEN, UP TO 24 HOURS

## 2019-12-21 PROCEDURE — 25000003 PHARM REV CODE 250: Performed by: FAMILY MEDICINE

## 2019-12-21 PROCEDURE — G0378 HOSPITAL OBSERVATION PER HR: HCPCS

## 2019-12-21 PROCEDURE — 36415 COLL VENOUS BLD VENIPUNCTURE: CPT

## 2019-12-21 PROCEDURE — 96375 TX/PRO/DX INJ NEW DRUG ADDON: CPT

## 2019-12-21 PROCEDURE — 82271 OCCULT BLOOD OTHER SOURCES: CPT

## 2019-12-21 PROCEDURE — 94761 N-INVAS EAR/PLS OXIMETRY MLT: CPT

## 2019-12-21 PROCEDURE — 25000242 PHARM REV CODE 250 ALT 637 W/ HCPCS: Performed by: FAMILY MEDICINE

## 2019-12-21 PROCEDURE — 63600175 PHARM REV CODE 636 W HCPCS: Performed by: INTERNAL MEDICINE

## 2019-12-21 PROCEDURE — 84100 ASSAY OF PHOSPHORUS: CPT

## 2019-12-21 PROCEDURE — 85014 HEMATOCRIT: CPT

## 2019-12-21 PROCEDURE — C9113 INJ PANTOPRAZOLE SODIUM, VIA: HCPCS | Performed by: INTERNAL MEDICINE

## 2019-12-21 PROCEDURE — 97165 OT EVAL LOW COMPLEX 30 MIN: CPT

## 2019-12-21 PROCEDURE — 85018 HEMOGLOBIN: CPT

## 2019-12-21 PROCEDURE — 94640 AIRWAY INHALATION TREATMENT: CPT

## 2019-12-21 PROCEDURE — 85027 COMPLETE CBC AUTOMATED: CPT

## 2019-12-21 RX ORDER — PANTOPRAZOLE SODIUM 40 MG/10ML
40 INJECTION, POWDER, LYOPHILIZED, FOR SOLUTION INTRAVENOUS 2 TIMES DAILY
Status: DISCONTINUED | OUTPATIENT
Start: 2019-12-21 | End: 2019-12-21 | Stop reason: HOSPADM

## 2019-12-21 RX ADMIN — SEVELAMER CARBONATE 800 MG: 800 TABLET, FILM COATED ORAL at 09:12

## 2019-12-21 RX ADMIN — CALCIUM ACETATE 1334 MG: 667 CAPSULE ORAL at 09:12

## 2019-12-21 RX ADMIN — PANTOPRAZOLE SODIUM 40 MG: 40 INJECTION, POWDER, FOR SOLUTION INTRAVENOUS at 09:12

## 2019-12-21 RX ADMIN — FLUTICASONE FUROATE AND VILANTEROL TRIFENATATE 1 PUFF: 100; 25 POWDER RESPIRATORY (INHALATION) at 07:12

## 2019-12-21 NOTE — PROGRESS NOTES
Patient Griselda Neely was ordered calcium acetate and sevelamer carbonate  as home meds by Dr. Ingram.  DR Neely is taking call for Dr. Ingram and, when called, requested that pharmacy enter both of these orders for now and she will see patient in AM 12/21 to clarify these orders.  Taisha Hale  12/20/2019

## 2019-12-21 NOTE — PLAN OF CARE
12/20/19 1930   Patient Assessment/Suction   Level of Consciousness (AVPU) alert   Respiratory Effort Unlabored   Expansion/Accessory Muscles/Retractions no use of accessory muscles   All Lung Fields Breath Sounds clear   Rhythm/Pattern, Respiratory unlabored   Cough Frequency infrequent   Cough Type no productive sputum   PRE-TX-O2   O2 Device (Oxygen Therapy) nasal cannula   Flow (L/min) 3   SpO2 98 %   Pulse (!) 127   Resp 20   Aerosol Therapy   $ Aerosol Therapy Charges PRN treatment not required   Respiratory Treatment Status (SVN) PRN treatment not required

## 2019-12-21 NOTE — NURSING
PER NIGHT NURSE CHRIS PT. HAD EPISODE OF VOMITING BLACK COFFEE GROUND EMESIS LAST NIGHT. MD WAS NOTIFIED AND ORDERED A NGT WHICH PT. REFUSED. PT IS NPO UNTIL FURTHER NOTICE. AS OF ROUNDING PT STATED NO N/V SINCE . WILL CONTINUE TO MONITOR.

## 2019-12-21 NOTE — CONSULTS
"Consult Note  Gastroenterology/Hepatology    Consult Requested By: hospital medicine  Reason for Consult: dark vomitus    SUBJECTIVE:     History of Present Illness: This is a very pleasant 75yo F with COPD, ESRD, CAD, s/p AVR, calcipylaxis, who was admitted last night with confusion.  Confusion had started 3 days prior after being started on Xanax.  Patient admitted to the hospital overnight for workup and observation.  Overnight she had an episode of vomitus that was described as "dark" and GI was consulted.  Patient reports it was dark brown.  There are stains in her basin at the bedside that are also dark brown.  No obvious blood and no clear history that this was black or coffee grind like.  Patient states she feels fine today.  No further vomiting.  She has been NPO and she is hungry.  She has no prior hx of GI bleeding and has never had endoscopy.  She wants to go home today.     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    both legs    CHF (congestive heart failure)     Encounter for blood transfusion 03/2016    Gout     Hypertension     Mitral valve regurgitation     Osteoarthritis     Pancreatitis     Peripheral vascular disease     Peritoneal dialysis catheter in place     Pneumonia 09/09/2017    Renal failure      Past Surgical History:   Procedure Laterality Date    ACHILLES TENDON SURGERY Right     APPENDECTOMY      CARDIAC CATHETERIZATION  07/03/2017    CHOLECYSTECTOMY      heal surgery Right     HYSTERECTOMY      PARATHYROIDECTOMY      PARATHYROIDECTOMY  07/13/2017    PERITONEAL CATHETER INSERTION      RENAL BIOPSY      vocal cord nodule       Family History   Problem Relation Age of Onset    Heart disease Sister     Early death Sister         heart as baby    Heart disease Maternal Grandfather     Diabetes Mother     Hypertension Mother     Heart failure Mother     Heart " disease Mother     Arthritis Mother     Diabetes Father     Early death Sister         infant     Social History     Tobacco Use    Smoking status: Current Some Day Smoker     Packs/day: 0.50     Years: 45.00     Pack years: 22.50    Smokeless tobacco: Never Used   Substance Use Topics    Alcohol use: No    Drug use: No       Review of Systems:  ROS negative except as per HPI    OBJECTIVE:     Vital Signs:  Temp:  [97.4 °F (36.3 °C)-97.5 °F (36.4 °C)]   Pulse:  []   Resp:  [18-20]   BP: ()/(63-84)   SpO2:  [97 %-100 %]     Physical Exam:  General: elderly AAF.  NAD.  Conversant.   Eyes: conjunctivae/corneas clear. PERRL..  HENT: Head:normocephalic, atraumatic. Ears:hearing grossly normal bilaterally. Nose: Nares normal. Septum midline. Mucosa normal. No drainage or sinus tenderness., no discharge. Throat: lips, mucosa, and tongue normal; teeth and gums normal and no throat erythema.  Neck: supple, symmetrical, trachea midline, no JVD and thyroid not enlarged, symmetric, no tenderness/mass/nodules  Lungs:  clear to auscultation bilaterally and normal respiratory effort  Cardiovascular: Heart: regular rate and rhythm, S1, S2 normal, no murmur, click, rub or gallop. Chest Wall: no tenderness. Extremities: no cyanosis or edema, or clubbing. Pulses: 2+ and symmetric.  Abdomen/Rectal: Abdomen: soft, non-tender non-distented; bowel sounds normal; no masses,  no organomegaly. Rectal: not examined  Skin: Skin color, texture, turgor normal. No rashes or lesions  Musculoskeletal:normal gait and no clubbing, cyanosis  Lymph Nodes: No cervical or supraclavicular adenopathy  Neurologic: Normal strength and tone. No focal numbness or weakness  Psych/Behavioral:  Normal. and Alert and oriented, appropriate affect.      Laboratory and Radiology Data:  CBC:   Recent Labs   Lab 12/21/19  0500 12/21/19  1200   WBC 10.18  --    RBC 3.98*  --    HGB 10.9* 10.9*   HCT 34.1* 33.2*     --    MCV 86  --    MCH 27.4   "--    MCHC 32.0  --      BMP:   Recent Labs   Lab 12/21/19  0500   *      K 4.2   CL 92*   CO2 23   BUN 34*   CREATININE 4.3*   CALCIUM 8.5*   MG 1.8     CMP:   Recent Labs   Lab 12/20/19  0610 12/21/19  0500   GLU 80 150*   CALCIUM 8.6* 8.5*   ALBUMIN 3.0*  --    PROT 7.6  --     139   K 4.6 4.2   CO2 25 23   CL 87* 92*   BUN 43* 34*   CREATININE 5.7* 4.3*   ALKPHOS 167*  --    ALT 14  --    AST 26  --    BILITOT 2.1*  --      LFTs:   Recent Labs   Lab 12/20/19  0610   ALT 14   AST 26   ALKPHOS 167*   BILITOT 2.1*   PROT 7.6   ALBUMIN 3.0*     Coagulation: No results for input(s): LABPROT, INR, APTT in the last 168 hours.    ASSESSMENT/PLAN:   This is a very pleasant 75yo F with above hx who was admitted last night with confusion x 3 days, that started after taking Xanax.  Overnight patient had episode of vomiting that appeared "dark" so we are consulted to evaluate for possible GI bleed.  Hgb is 10.9, increased from 10.8 yesterday.  Patient feels well today and is asking to eat and to go home.       Plan:  1. Dark vomitus- low suspicion for clinically significant GI bleeding. Hgb is trending up. No need to perform emergent endoscopy today.  Ok for patient to eat.  SHe is currently asymptomatic and is hungry.  Will continue Protonix 40mg BID for now.  Ok to be discharged later today or tomorrow if patient tolerates a diet.  She can continue course of Protonix 40mg BID for 1 month as outpatient and can f/u with GI as outpatient to determine if outpatient EGD is necessary.        Thank you very much for the consult.  I will sign off. Please do not hesitate to contact me with any questions or concerns.    Jordan Kilpatrick MD   "

## 2019-12-21 NOTE — PLAN OF CARE
12/21/19 0721   Patient Assessment/Suction   Level of Consciousness (AVPU) alert   Respiratory Effort Normal;Unlabored   All Lung Fields Breath Sounds clear   PRE-TX-O2   O2 Device (Oxygen Therapy) nasal cannula   $ Is the patient on Low Flow Oxygen? Yes   Flow (L/min) 3   SpO2 100 %   Pulse Oximetry Type Intermittent   $ Pulse Oximetry - Multiple Charge Pulse Oximetry - Multiple   Pulse 98   Resp 18   Inhaler   $ Inhaler Charges MDI (Metered Dose Inahler) Treatment  (BREO)   Daily Review of Necessity (Inhaler) completed   Respiratory Treatment Status (Inhaler) given   Treatment Route (Inhaler) mouthpiece   Patient Position (Inhaler) HOB elevated   Post Treatment Assessment (Inhaler) breath sounds improved   Signs of Intolerance (Inhaler) none

## 2019-12-21 NOTE — SUBJECTIVE & OBJECTIVE
Interval History:  Patient had episode of coffee-ground emesis this morning that was occult blood positive.  GI consulted.  H&H ordered for 12:00 p.m..  H&H appears stable past 24 hr.    Review of Systems   Constitutional: Negative for chills, fatigue, fever and unexpected weight change.   HENT: Negative for sore throat.    Eyes: Negative for visual disturbance.   Respiratory: Negative for cough, chest tightness and shortness of breath.    Cardiovascular: Negative for chest pain.   Gastrointestinal: Negative for abdominal pain, constipation, diarrhea, nausea and vomiting.   Endocrine: Negative for polyuria.   Genitourinary: Negative for dysuria and hematuria.   Neurological: Negative for headaches.     Objective:     Vital Signs (Most Recent):  Temp: 97.5 °F (36.4 °C) (12/21/19 0715)  Pulse: 98 (12/21/19 0721)  Resp: 18 (12/21/19 0721)  BP: (!) 156/84 (12/21/19 0715)  SpO2: 100 % (12/21/19 0721) Vital Signs (24h Range):  Temp:  [97.5 °F (36.4 °C)-98 °F (36.7 °C)] 97.5 °F (36.4 °C)  Pulse:  [] 98  Resp:  [18-31] 18  SpO2:  [93 %-100 %] 100 %  BP: (127-161)/(63-85) 156/84     Weight: 59.4 kg (130 lb 14.4 oz)  Body mass index is 21.78 kg/m².    Intake/Output Summary (Last 24 hours) at 12/21/2019 0848  Last data filed at 12/20/2019 1325  Gross per 24 hour   Intake 500 ml   Output --   Net 500 ml      Physical Exam   Constitutional: She is oriented to person, place, and time. No distress.   Frail appearing   HENT:   Right Ear: External ear normal.   Left Ear: External ear normal.   Eyes: Conjunctivae and EOM are normal. Right eye exhibits no discharge. Left eye exhibits no discharge.   Neck: Normal range of motion.   Cardiovascular: Normal rate and normal heart sounds. Exam reveals no gallop and no friction rub.   No murmur heard.  IRR, RUE fistula   Pulmonary/Chest: Effort normal. No respiratory distress. She has wheezes. She has no rales. She exhibits no tenderness.   Musculoskeletal: She exhibits no edema or  tenderness.   Neurological: She is alert and oriented to person, place, and time.   Skin: Skin is warm. She is not diaphoretic.   Wound in left inner thigh, calciphylaxis lower extremity, neuropathy BL LE    Psychiatric: She has a normal mood and affect. Her behavior is normal. Judgment and thought content normal.   Nursing note and vitals reviewed.      Significant Labs:   Blood Culture:   Recent Labs   Lab 12/20/19  0610 12/20/19  0615   LABBLOO No Growth to date  No Growth to date No Growth to date  No Growth to date     CBC:   Recent Labs   Lab 12/20/19  0610 12/21/19  0500   WBC 8.53 10.18   HGB 10.8* 10.9*   HCT 33.8* 34.1*    174     CMP:   Recent Labs   Lab 12/20/19  0610 12/21/19  0500    139   K 4.6 4.2   CL 87* 92*   CO2 25 23   GLU 80 150*   BUN 43* 34*   CREATININE 5.7* 4.3*   CALCIUM 8.6* 8.5*   PROT 7.6  --    ALBUMIN 3.0*  --    BILITOT 2.1*  --    ALKPHOS 167*  --    AST 26  --    ALT 14  --    ANIONGAP 25* 24*   EGFRNONAA 6.8* 9.6*     Lactic Acid:   Recent Labs   Lab 12/20/19  0635 12/20/19  0820 12/20/19  1351   LACTATE 2.6* 2.3* 1.4

## 2019-12-21 NOTE — DISCHARGE SUMMARY
[facility] Medicine  Discharge Summary      Patient Name: Griselda Neely  MRN: [id number]  Admission Date: 12/20/2019  Hospital Length of Stay: 0 days  Discharge Date and Time:  12/21/2019 2:22 PM  Attending Physician: Jhoan Fuentes MD   Discharging Provider: Jhoan Fuentes MD  Primary Care Provider: Kalie Neely MD      HPI:   HPI per patient and per patient's sister was at bedside  74-year-old female history of hypertension, ESRD (MWF, Dr. Ingram), COPD on 3 L of home, AFib, seizures, PVD, HFrEF (45%, 7/2019) comes in for acute encephalopathy and generalized weakness.  Patient reports that she has been feeling well without acute signs of illness.  She woke up early this morning and attempted to use the bathroom.  When she got out of bed, she slid down to the floor.  Denies any chest pain, shortness of breath, nausea, vomiting, tongue biting, loss of consciousness, head trauma.  When patient was on the floor she called her sister whom she lives with for assistance.  Her sister  attempted to lift her but had difficulty.  They became concerned and called EMS.  Per sister who is at bedside who does not live with patient reports that patient has been more confused and not making sense since this past Tuesday.  Reports that patient has been having a lot of pain and anxiety due to her calciphylaxis.  Patient was started on alprazolam on Tuesday and took a dose of that.  Has been having confusion and abnormal behavior since.  Has been also taking Toradol and Percocet at home for her pain. Family concerns for her confusion since that time.  Family reports that patient has not had any signs of acute illness including no complaints of coughing, chest pain, abdominal pain, diarrhea, constipation, any focal pain.    In the ED, patient was started on vancomycin and Zosyn.  During my interview, patient was alert oriented x4 and responding appropriately to questions and  commands.    * No surgery found *      Hospital Course:   Patient was admitted for acute encephalopathy likely due to medication induced.  Patient was recently started on a benzodiazepine and on narcotics at home.  All sedating medications were discontinued.  Patient had dialysis on the day of admission.  During hospitalization patient's mentation improved and her encephalopathy resolved.  The patient reports that she believes that she may have self overmedicated with narcotics.  She is aware now and will attempt to not repeat it.  During hospitalization, patient had episode of dark emesis.  GI evaluated and did not recommend further intervention at this time.  Patient tolerated diet and did well.  Patient was stable at discharge with instructions to follow up with Nephrology and GI.    General: Patient resting comfortably in no acute distress. Appears as stated age. Calm. Frail appearing  Eyes: EOM intact. No conjunctivae injection. No scleral icterus.  ENT: Hearing grossly intact. No discharge from ears. No nasal discharge.   CVS: RRR. No LE edema BL.  Lungs: Faint wheezes bilaterally. Good breath sounds. No accessory muscle use. No acute respiratory distress  Neuro: AOx3. GCS 15. Cranial nerves grossly intact. Moves all extremities equally. Follows commands. Responds appropriately     Case discussed with GI, Dr. Kilpatrick, prior to discharge     Consults:   Consults (From admission, onward)        Status Ordering Provider     Inpatient consult to Gastroenterology  Once     Provider:  Jordan Kilpatrick MD    Completed JOYCE FUENTES     Inpatient consult to Hospitalist  Once     Provider:  Joyce Fuentes MD    Acknowledged JOYCE FUENTES     Inpatient consult to Nephrology  Once     Provider:  Joseph Ingram MD    Completed JOYCE FEUNTES          No new Assessment & Plan notes have been filed under this hospital service since the last note was generated.  Service: Hospital Medicine    Final  Active Diagnoses:    Diagnosis Date Noted POA    Seizures [R56.9] 12/20/2019 Yes     Chronic    HLD (hyperlipidemia) [E78.5] 12/20/2019 Yes     Chronic    PVD (peripheral vascular disease) [I73.9] 12/20/2019 Yes     Chronic    Congestive heart failure [I50.9] 10/19/2019 Yes     Chronic    Chronic respiratory failure [J96.10] 05/13/2019 Yes     Chronic    Pulmonary hypertension [I27.20] 07/25/2017 Yes     Chronic    Atrial fibrillation [I48.91] 07/18/2017 Yes     Chronic    Calciphylaxis [E83.59] 07/18/2017 Yes     Chronic    ESRD (end stage renal disease) on HD M,W, F [N18.6] 05/11/2016 Yes     Chronic    Chronic obstructive pulmonary disease [J44.9] 10/19/2015 Yes     Chronic    Tobacco dependence [F17.200] 09/09/2013 Yes     Chronic    HTN (hypertension) [I10] 08/15/2013 Yes     Chronic      Problems Resolved During this Admission:    Diagnosis Date Noted Date Resolved POA    PRINCIPAL PROBLEM:  Acute encephalopathy [G93.40] 08/06/2019 12/21/2019 Yes    Generalized weakness [R53.1] 12/20/2019 12/21/2019 Yes    Lactic acidosis [E87.2] 10/19/2019 12/21/2019 Yes       Discharged Condition: stable    Disposition: Home or Self Care    Follow Up:  Follow-up Information     Kalie Neely MD In 1 week.    Specialty:  Family Medicine  Why:  For check up s/p hospital discharge  Contact information:  [address]  [address]  [place] [address]  [phone]             Joseph Ingram MD In 2 days.    Specialty:  Nephrology  Why:  Continue HD  Contact information:  [address]  [facility] [address]  [phone]             Jordan Kilpatrick MD In 1 month.    Specialty:  Gastroenterology  Contact information:  [address]  [facility] [address]  [phone]                 Patient Instructions:      Ambulatory referral to Gastroenterology   Referral Priority: Routine Referral Type: Consultation   Referral Reason: Specialty Services Required   Requested  Specialty: Gastroenterology   Number of Visits Requested: 1     Diet renal     Notify your health care provider if you experience any of the following:  temperature >100.4     Notify your health care provider if you experience any of the following:  persistent nausea and vomiting or diarrhea     Notify your health care provider if you experience any of the following:  severe uncontrolled pain     Notify your health care provider if you experience any of the following:  redness, tenderness, or signs of infection (pain, swelling, redness, odor or green/yellow discharge around incision site)     Notify your health care provider if you experience any of the following:  difficulty breathing or increased cough     Notify your health care provider if you experience any of the following:  severe persistent headache     Notify your health care provider if you experience any of the following:  worsening rash     Notify your health care provider if you experience any of the following:  persistent dizziness, light-headedness, or visual disturbances     Notify your health care provider if you experience any of the following:  increased confusion or weakness     Activity as tolerated       Significant Diagnostic Studies: Labs:   CMP   Recent Labs   Lab 12/20/19  0610 12/21/19  0500    139   K 4.6 4.2   CL 87* 92*   CO2 25 23   GLU 80 150*   BUN 43* 34*   CREATININE 5.7* 4.3*   CALCIUM 8.6* 8.5*   PROT 7.6  --    ALBUMIN 3.0*  --    BILITOT 2.1*  --    ALKPHOS 167*  --    AST 26  --    ALT 14  --    ANIONGAP 25* 24*   ESTGFRAFRICA 7.9* 11.1*   EGFRNONAA 6.8* 9.6*    and CBC   Recent Labs   Lab 12/20/19  0610 12/21/19  0500 12/21/19  1200   WBC 8.53 10.18  --    HGB 10.8* 10.9* 10.9*   HCT 33.8* 34.1* 33.2*    174  --      Microbiology:   Blood Culture   Lab Results   Component Value Date    LABBLOO No Growth to date 12/20/2019    LABBLOO No Growth to date 12/20/2019       Pending Diagnostic Studies:     None          Medications:  Reconciled Home Medications:      Medication List      CONTINUE taking these medications    aspirin 81 MG Chew  Take 81 mg by mouth once daily.     calcium acetate 667 mg capsule  Commonly known as:  PHOSLO  Take 1,334 mg by mouth 3 (three) times daily with meals.     diltiaZEM 180 MG 24 hr capsule  Commonly known as:  CARDIZEM CD  Take 180 mg by mouth once daily.     fluticasone furoate-vilanterol 100-25 mcg/dose diskus inhaler  Commonly known as:  BREO  Inhale 1 puff into the lungs once daily. Controller     isosorbide mononitrate 30 MG 24 hr tablet  Commonly known as:  IMDUR  Take 30 mg by mouth once daily.     levETIRAcetam 500 MG Tab  Commonly known as:  KEPPRA  Take 500 mg by mouth every Mon, Wed, Fri. After diaylsis     losartan 25 MG tablet  Commonly known as:  COZAAR  Take 25 mg by mouth once daily.     magnesium oxide 400 mg (241.3 mg magnesium) tablet  Commonly known as:  MAG-OX  Take 400 mg by mouth once daily.     metoprolol succinate 50 MG 24 hr tablet  Commonly known as:  TOPROL-XL  Take 1 tablet (50 mg total) by mouth once daily.     ondansetron 4 MG Tbdl  Commonly known as:  ZOFRAN-ODT  Take 1 tablet (4 mg total) by mouth every 6 (six) hours as needed.     oxyCODONE-acetaminophen 5-325 mg per tablet  Commonly known as:  PERCOCET  Take 1 tablet by mouth daily as needed for Pain (with dialysis).     sevelamer HCl 800 MG Tab  Commonly known as:  RENAGEL  Take 1 tablet (800 mg total) by mouth 3 (three) times daily with meals.     traMADol 50 mg tablet  Commonly known as:  ULTRAM  Take 1 tablet (50 mg total) by mouth 3 (three) times a week. 3 TIMES A WEEK AS NEEDED DURING HEMODIALYSIS            Indwelling Lines/Drains at time of discharge:   Lines/Drains/Airways     Drain                 Hemodialysis AV Fistula 08/08/19 0214 Right upper arm 135 days          Pressure Ulcer                 Pressure Injury 10/23/19 1300 Right Buttocks Stage 2 59 days                Time spent on the  discharge of patient: 35 minutes  Patient was seen and examined on the date of discharge and determined to be suitable for discharge.         Jhoan Fuentes MD  Department of Hospital Medicine  [facility]  Date of service: 12/21/2019 2:23 PM

## 2019-12-21 NOTE — HOSPITAL COURSE
Patient was admitted for acute encephalopathy likely due to medication induced.  Patient was recently started on a benzodiazepine and on narcotics at home.  All sedating medications were discontinued.  Patient had dialysis on the day of admission.  During hospitalization patient's mentation improved and her encephalopathy resolved.  The patient reports that she believes that she may have self overmedicated with narcotics.  She is aware now and will attempt to not repeat it.  During hospitalization, patient had episode of dark emesis.  GI evaluated and did not recommend further intervention at this time.  Patient tolerated diet and did well.  Patient was stable at discharge with instructions to follow up with Nephrology and GI.    General: Patient resting comfortably in no acute distress. Appears as stated age. Calm. Frail appearing  Eyes: EOM intact. No conjunctivae injection. No scleral icterus.  ENT: Hearing grossly intact. No discharge from ears. No nasal discharge.   CVS: RRR. No LE edema BL.  Lungs: Faint wheezes bilaterally. Good breath sounds. No accessory muscle use. No acute respiratory distress  Neuro: AOx3. GCS 15. Cranial nerves grossly intact. Moves all extremities equally. Follows commands. Responds appropriately     Case discussed with GI, Dr. Kilpatrick, prior to discharge

## 2019-12-21 NOTE — PT/OT/SLP EVAL
Occupational Therapy   Evaluation and Discharge Note    Name: Griselda Neely  MRN: [id number]  Admitting Diagnosis:  Acute encephalopathy      Recommendations:     Discharge Recommendations: home  Discharge Equipment Recommendations:  none  Barriers to discharge:  None    Assessment:     Griselda Neely is a 74 y.o. female with a medical diagnosis of Acute encephalopathy. At this time, patient is functioning at their prior level of function and does not require further acute OT services.     Plan:     During this hospitalization, patient does not require further acute OT services.  Please re-consult if situation changes.    · Plan of Care Reviewed with: patient, sibling    Subjective     Chief Complaint: None   Patient/Family Comments/goals: to go home    Occupational Profile:  Living Environment: lives with sister and brother   Previous level of function: requires some assistance for lower body dressing, bathing, but able to perform upper body ADLs, grooming without assistance. Relies on family for transportation.  Roles and Routines: limited homemaker  Equipment Used at home:  cane, straight, walker, rolling, wheelchair  Assistance upon Discharge: sister and brother    Pain/Comfort:  · Pain Rating 1: 0/10  · Pain Rating Post-Intervention 1: 0/10    Patients cultural, spiritual, Latter-day conflicts given the current situation: no    Objective:     Communicated with: nurse prior to session.  Patient found up in chair with   upon OT entry to room.    General Precautions: Standard, fall   Orthopedic Precautions:N/A   Braces: N/A     Occupational Performance:    Bed Mobility:    · Did not perform, patient sitting in chair upon entering room and at end of session.    Functional Mobility/Transfers:  · Patient completed Toilet Transfer Step Transfer technique with supervision with  rolling walker  · Functional Mobility: ambulated 20 feet in the hospital room and bathroom with supervision using rolling  walker.    Activities of Daily Living:  · Upper Body Dressing: supervision standing    Cognitive/Visual Perceptual:  Cognitive/Psychosocial Skills:     -       Oriented to: Person, Place, Time and Situation   -       Follows Commands/attention:Follows multistep  commands  -       Communication: clear/fluent  -       Memory: No Deficits noted  -       Safety awareness/insight to disability: intact   -       Mood/Affect/Coping skills/emotional control: Cooperative and Guarded  Visual/Perceptual:      -Intact wears glasses for everyday use    Physical Exam:  Balance:    -       Sitting/Standing: Supervision  Upper Extremity Range of Motion:     -       Right Upper Extremity: WFL  -       Left Upper Extremity: WFL  Upper Extremity Strength:    -       Right Upper Extremity: WFL  -       Left Upper Extremity: WFL   Strength:    -       Right Upper Extremity: Fair  -       Left Upper Extremity: Fair  Fine Motor Coordination:    -       Intact    AMPAC 6 Click ADL:  AMPAC Total Score: 21    Treatment & Education:  Patient performed upper body dressing, ambulation and toilet transfer with supervision using rolling walker with no safety concerns.  Education:    Patient left up in chair with all lines intact, call button in reach and sister present    GOALS:   Multidisciplinary Problems     Occupational Therapy Goals     Not on file                History:     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    both legs    CHF (congestive heart failure)     Encounter for blood transfusion 03/2016    Gout     Hypertension     Mitral valve regurgitation     Osteoarthritis     Pancreatitis     Peripheral vascular disease     Peritoneal dialysis catheter in place     Pneumonia 09/09/2017    Renal failure        Past Surgical History:   Procedure Laterality Date    ACHILLES TENDON SURGERY Right     APPENDECTOMY      CARDIAC CATHETERIZATION  07/03/2017    CHOLECYSTECTOMY      heal  surgery Right     HYSTERECTOMY      PARATHYROIDECTOMY      PARATHYROIDECTOMY  07/13/2017    PERITONEAL CATHETER INSERTION      RENAL BIOPSY      vocal cord nodule         Time Tracking:     OT Date of Treatment:    OT Start Time: 0850  OT Stop Time: 0905  OT Total Time (min): 15 min    Billable Minutes:Evaluation 15    Gallo Siddiqi OT  12/21/2019

## 2019-12-21 NOTE — PT/OT/SLP PROGRESS
"Physical Therapy      Patient Name:  Griselda Neely   MRN:  [id number]    Patient not seen today secondary to Patient unwilling to participate. Pt sitting up in chair and states up with RN and just ambulated to rest room with RN; "Ya'll are too late." Encouraged pt for PT participation for additional gait training and pt unwilling 2/2 NPO status and feels weak because has not eaten. Will follow-up 12/22/19.    Heike Fraser, PT    "

## 2019-12-25 LAB
BACTERIA BLD CULT: NORMAL
BACTERIA BLD CULT: NORMAL

## 2020-01-02 ENCOUNTER — HOSPITAL ENCOUNTER (INPATIENT)
Facility: HOSPITAL | Age: 75
LOS: 2 days | Discharge: HOME HEALTH | DRG: 252 | End: 2020-01-08
Attending: EMERGENCY MEDICINE | Admitting: INTERNAL MEDICINE
Payer: MEDICARE

## 2020-01-02 DIAGNOSIS — L97.909 CALCIPHYLAXIS WITH NONHEALING ULCER OF LEG: ICD-10-CM

## 2020-01-02 DIAGNOSIS — N18.6 ESRD (END STAGE RENAL DISEASE): ICD-10-CM

## 2020-01-02 DIAGNOSIS — D72.829 LEUKOCYTOSIS, UNSPECIFIED TYPE: ICD-10-CM

## 2020-01-02 DIAGNOSIS — E83.59 CALCIPHYLAXIS WITH NONHEALING ULCER OF LEG: ICD-10-CM

## 2020-01-02 DIAGNOSIS — N18.6 ESRD (END STAGE RENAL DISEASE): Chronic | ICD-10-CM

## 2020-01-02 DIAGNOSIS — R06.02 SHORTNESS OF BREATH: ICD-10-CM

## 2020-01-02 DIAGNOSIS — R09.89 RALES: ICD-10-CM

## 2020-01-02 DIAGNOSIS — J96.11 CHRONIC RESPIRATORY FAILURE WITH HYPOXIA AND HYPERCAPNIA: Chronic | ICD-10-CM

## 2020-01-02 DIAGNOSIS — I48.91 A-FIB: ICD-10-CM

## 2020-01-02 DIAGNOSIS — I51.3 LEFT ATRIAL THROMBUS: Primary | ICD-10-CM

## 2020-01-02 DIAGNOSIS — R06.02 SOB (SHORTNESS OF BREATH): ICD-10-CM

## 2020-01-02 DIAGNOSIS — R17 ELEVATED BILIRUBIN: ICD-10-CM

## 2020-01-02 DIAGNOSIS — J96.12 CHRONIC RESPIRATORY FAILURE WITH HYPOXIA AND HYPERCAPNIA: Chronic | ICD-10-CM

## 2020-01-02 DIAGNOSIS — T82.858A AV FISTULA STENOSIS: ICD-10-CM

## 2020-01-02 DIAGNOSIS — I48.91 ATRIAL FIBRILLATION, UNSPECIFIED TYPE: ICD-10-CM

## 2020-01-02 DIAGNOSIS — G93.40 ENCEPHALOPATHY ACUTE: ICD-10-CM

## 2020-01-02 LAB
ALBUMIN SERPL BCP-MCNC: 2.9 G/DL (ref 3.5–5.2)
ALLENS TEST: ABNORMAL
ALP SERPL-CCNC: 209 U/L (ref 55–135)
ALT SERPL W/O P-5'-P-CCNC: 23 U/L (ref 10–44)
ANION GAP SERPL CALC-SCNC: 27 MMOL/L (ref 8–16)
APAP SERPL-MCNC: <10 UG/ML (ref 10–20)
AST SERPL-CCNC: 76 U/L (ref 10–40)
BASOPHILS # BLD AUTO: 0.03 K/UL (ref 0–0.2)
BASOPHILS NFR BLD: 0.2 % (ref 0–1.9)
BILIRUB SERPL-MCNC: 1.3 MG/DL (ref 0.1–1)
BNP SERPL-MCNC: >4500 PG/ML (ref 0–99)
BUN SERPL-MCNC: 46 MG/DL (ref 8–23)
CALCIUM SERPL-MCNC: 7.3 MG/DL (ref 8.7–10.5)
CHLORIDE SERPL-SCNC: 90 MMOL/L (ref 95–110)
CO2 SERPL-SCNC: 23 MMOL/L (ref 23–29)
CREAT SERPL-MCNC: 6.6 MG/DL (ref 0.5–1.4)
DELSYS: ABNORMAL
DIFFERENTIAL METHOD: ABNORMAL
EOSINOPHIL # BLD AUTO: 0 K/UL (ref 0–0.5)
EOSINOPHIL NFR BLD: 0 % (ref 0–8)
ERYTHROCYTE [DISTWIDTH] IN BLOOD BY AUTOMATED COUNT: 17.4 % (ref 11.5–14.5)
EST. GFR  (AFRICAN AMERICAN): 6.6 ML/MIN/1.73 M^2
EST. GFR  (NON AFRICAN AMERICAN): 5.7 ML/MIN/1.73 M^2
ETHANOL SERPL-MCNC: <5 MG/DL
FLOW: 2
GLUCOSE SERPL-MCNC: 72 MG/DL (ref 70–110)
HCO3 UR-SCNC: 25.8 MMOL/L (ref 24–28)
HCT VFR BLD AUTO: 31.5 % (ref 37–48.5)
HGB BLD-MCNC: 10.1 G/DL (ref 12–16)
IMM GRANULOCYTES # BLD AUTO: 0.26 K/UL (ref 0–0.04)
IMM GRANULOCYTES NFR BLD AUTO: 1.5 % (ref 0–0.5)
INR PPP: 1.4
LIPASE SERPL-CCNC: 71 U/L (ref 4–60)
LYMPHOCYTES # BLD AUTO: 1.1 K/UL (ref 1–4.8)
LYMPHOCYTES NFR BLD: 6.2 % (ref 18–48)
MAGNESIUM SERPL-MCNC: 1.8 MG/DL (ref 1.6–2.6)
MCH RBC QN AUTO: 27.8 PG (ref 27–31)
MCHC RBC AUTO-ENTMCNC: 32.1 G/DL (ref 32–36)
MCV RBC AUTO: 87 FL (ref 82–98)
MODE: ABNORMAL
MONOCYTES # BLD AUTO: 1 K/UL (ref 0.3–1)
MONOCYTES NFR BLD: 5.6 % (ref 4–15)
NEUTROPHILS # BLD AUTO: 15 K/UL (ref 1.8–7.7)
NEUTROPHILS NFR BLD: 86.5 % (ref 38–73)
NRBC BLD-RTO: 0 /100 WBC
PCO2 BLDA: 37.8 MMHG (ref 35–45)
PH SMN: 7.44 [PH] (ref 7.35–7.45)
PHOSPHATE SERPL-MCNC: 8 MG/DL (ref 2.7–4.5)
PLATELET # BLD AUTO: 220 K/UL (ref 150–350)
PMV BLD AUTO: 10.6 FL (ref 9.2–12.9)
PO2 BLDA: 57 MMHG (ref 80–100)
POC BE: 2 MMOL/L
POC PCO2 TEMP: 37.8 MMHG
POC PH TEMP: 7.44
POC PO2 TEMP: 57 MMHG
POC SATURATED O2: 90 % (ref 95–100)
POC TCO2: 27 MMOL/L (ref 23–27)
POC TEMPERATURE: ABNORMAL
POTASSIUM SERPL-SCNC: 4.7 MMOL/L (ref 3.5–5.1)
PROT SERPL-MCNC: 7.4 G/DL (ref 6–8.4)
PROTHROMBIN TIME: 16.6 SEC (ref 10.6–14.8)
RBC # BLD AUTO: 3.63 M/UL (ref 4–5.4)
SALICYLATES SERPL-MCNC: <4 MG/DL (ref 15–30)
SAMPLE: ABNORMAL
SITE: ABNORMAL
SODIUM SERPL-SCNC: 140 MMOL/L (ref 136–145)
SP02: 100
TROPONIN I SERPL DL<=0.01 NG/ML-MCNC: 0.14 NG/ML
WBC # BLD AUTO: 17.28 K/UL (ref 3.9–12.7)

## 2020-01-02 PROCEDURE — 80320 DRUG SCREEN QUANTALCOHOLS: CPT

## 2020-01-02 PROCEDURE — 99900035 HC TECH TIME PER 15 MIN (STAT)

## 2020-01-02 PROCEDURE — 96376 TX/PRO/DX INJ SAME DRUG ADON: CPT

## 2020-01-02 PROCEDURE — 80053 COMPREHEN METABOLIC PANEL: CPT

## 2020-01-02 PROCEDURE — 36600 WITHDRAWAL OF ARTERIAL BLOOD: CPT

## 2020-01-02 PROCEDURE — 99285 EMERGENCY DEPT VISIT HI MDM: CPT | Mod: 25

## 2020-01-02 PROCEDURE — 93005 ELECTROCARDIOGRAM TRACING: CPT

## 2020-01-02 PROCEDURE — 84484 ASSAY OF TROPONIN QUANT: CPT

## 2020-01-02 PROCEDURE — 96375 TX/PRO/DX INJ NEW DRUG ADDON: CPT

## 2020-01-02 PROCEDURE — 36415 COLL VENOUS BLD VENIPUNCTURE: CPT

## 2020-01-02 PROCEDURE — 83735 ASSAY OF MAGNESIUM: CPT

## 2020-01-02 PROCEDURE — 83605 ASSAY OF LACTIC ACID: CPT

## 2020-01-02 PROCEDURE — 25000003 PHARM REV CODE 250: Performed by: STUDENT IN AN ORGANIZED HEALTH CARE EDUCATION/TRAINING PROGRAM

## 2020-01-02 PROCEDURE — 85025 COMPLETE CBC W/AUTO DIFF WBC: CPT

## 2020-01-02 PROCEDURE — 83880 ASSAY OF NATRIURETIC PEPTIDE: CPT

## 2020-01-02 PROCEDURE — 25000003 PHARM REV CODE 250: Performed by: EMERGENCY MEDICINE

## 2020-01-02 PROCEDURE — 80307 DRUG TEST PRSMV CHEM ANLYZR: CPT

## 2020-01-02 PROCEDURE — 84100 ASSAY OF PHOSPHORUS: CPT

## 2020-01-02 PROCEDURE — 87040 BLOOD CULTURE FOR BACTERIA: CPT

## 2020-01-02 PROCEDURE — 82803 BLOOD GASES ANY COMBINATION: CPT

## 2020-01-02 PROCEDURE — 83690 ASSAY OF LIPASE: CPT

## 2020-01-02 PROCEDURE — 85610 PROTHROMBIN TIME: CPT

## 2020-01-02 RX ORDER — DILTIAZEM HYDROCHLORIDE 5 MG/ML
10 INJECTION INTRAVENOUS
Status: COMPLETED | OUTPATIENT
Start: 2020-01-02 | End: 2020-01-02

## 2020-01-02 RX ORDER — DILTIAZEM HYDROCHLORIDE 5 MG/ML
15 INJECTION INTRAVENOUS
Status: COMPLETED | OUTPATIENT
Start: 2020-01-02 | End: 2020-01-02

## 2020-01-02 RX ORDER — ACETAMINOPHEN 500 MG
1000 TABLET ORAL ONCE
Status: COMPLETED | OUTPATIENT
Start: 2020-01-03 | End: 2020-01-02

## 2020-01-02 RX ADMIN — DILTIAZEM HYDROCHLORIDE 10 MG: 5 INJECTION INTRAVENOUS at 10:01

## 2020-01-02 RX ADMIN — DILTIAZEM HYDROCHLORIDE 15 MG: 5 INJECTION INTRAVENOUS at 11:01

## 2020-01-02 RX ADMIN — ACETAMINOPHEN 1000 MG: 500 TABLET, FILM COATED ORAL at 11:01

## 2020-01-02 NOTE — Clinical Note
The site was marked. Prepped: right brachial. Prepped with: ChloraPrep. The site was clipped. The patient was draped.

## 2020-01-02 NOTE — Clinical Note
25 ml injected throughout the case. 30 mL total wasted during the case. 55 mL total used in the case.

## 2020-01-03 ENCOUNTER — CLINICAL SUPPORT (OUTPATIENT)
Dept: CARDIOLOGY | Facility: HOSPITAL | Age: 75
DRG: 252 | End: 2020-01-03
Attending: INTERNAL MEDICINE
Payer: MEDICARE

## 2020-01-03 PROBLEM — G93.40 ENCEPHALOPATHY ACUTE: Status: ACTIVE | Noted: 2020-01-03

## 2020-01-03 PROBLEM — S81.802A WOUND OF LEFT LEG: Status: ACTIVE | Noted: 2017-07-20

## 2020-01-03 PROBLEM — I51.89 LEFT ATRIAL MASS: Status: ACTIVE | Noted: 2020-01-03

## 2020-01-03 PROBLEM — J90 LOCULATED PLEURAL EFFUSION: Status: ACTIVE | Noted: 2020-01-03

## 2020-01-03 LAB
AORTIC ROOT ANNULUS: 3.25 CM
AORTIC VALVE CUSP SEPERATION: 1.21 CM
AV INDEX (PROSTH): 0.56
AV MEAN GRADIENT: 5 MMHG
AV PEAK GRADIENT: 7 MMHG
AV VALVE AREA: 1.94 CM2
AV VELOCITY RATIO: 47.84
BSA FOR ECHO PROCEDURE: 1.64 M2
CV ECHO LV RWT: 0.35 CM
DOP CALC AO PEAK VEL: 1.3 M/S
DOP CALC AO VTI: 15.94 CM
DOP CALC LVOT AREA: 3.4 CM2
DOP CALC LVOT DIAMETER: 2.09 CM
DOP CALC LVOT PEAK VEL: 62.19 M/S
DOP CALC LVOT STROKE VOLUME: 30.86 CM3
DOP CALCLVOT PEAK VEL VTI: 9 CM
E WAVE DECELERATION TIME: 66.3 MSEC
E/A RATIO: 0.24
E/E' RATIO: 10.22 M/S
ECHO LV POSTERIOR WALL: 0.94 CM (ref 0.6–1.1)
FRACTIONAL SHORTENING: 8 % (ref 28–44)
INTERVENTRICULAR SEPTUM: 0.94 CM (ref 0.6–1.1)
LACTATE SERPL-SCNC: 5.2 MMOL/L (ref 0.5–1.9)
LDH SERPL L TO P-CCNC: 4.61 MMOL/L (ref 0.5–2.2)
LEFT ATRIUM SIZE: 5.33 CM
LEFT INTERNAL DIMENSION IN SYSTOLE: 4.99 CM (ref 2.1–4)
LEFT VENTRICLE DIASTOLIC VOLUME INDEX: 51.43 ML/M2
LEFT VENTRICLE DIASTOLIC VOLUME: 89.89 ML
LEFT VENTRICLE MASS INDEX: 110 G/M2
LEFT VENTRICLE SYSTOLIC VOLUME INDEX: 41 ML/M2
LEFT VENTRICLE SYSTOLIC VOLUME: 71.72 ML
LEFT VENTRICULAR INTERNAL DIMENSION IN DIASTOLE: 5.44 CM (ref 3.5–6)
LEFT VENTRICULAR MASS: 192.99 G
LV LATERAL E/E' RATIO: 9.2 M/S
LV SEPTAL E/E' RATIO: 11.5 M/S
MV PEAK A VEL: 1.89 M/S
MV PEAK E VEL: 0.46 M/S
OB PNL STL: NEGATIVE
PISA TR MAX VEL: 2.99 M/S
PV PEAK VELOCITY: 85.56 CM/S
SAMPLE: ABNORMAL
TDI LATERAL: 0.05 M/S
TDI SEPTAL: 0.04 M/S
TDI: 0.05 M/S
TR MAX PG: 36 MMHG
TROPONIN I SERPL DL<=0.01 NG/ML-MCNC: 0.12 NG/ML
TROPONIN I SERPL DL<=0.01 NG/ML-MCNC: 0.13 NG/ML
WBC #/AREA STL HPF: NORMAL /[HPF]

## 2020-01-03 PROCEDURE — 94761 N-INVAS EAR/PLS OXIMETRY MLT: CPT

## 2020-01-03 PROCEDURE — 25000003 PHARM REV CODE 250: Performed by: INTERNAL MEDICINE

## 2020-01-03 PROCEDURE — 87045 FECES CULTURE AEROBIC BACT: CPT

## 2020-01-03 PROCEDURE — 94640 AIRWAY INHALATION TREATMENT: CPT

## 2020-01-03 PROCEDURE — G0378 HOSPITAL OBSERVATION PER HR: HCPCS

## 2020-01-03 PROCEDURE — 90935 HEMODIALYSIS ONE EVALUATION: CPT

## 2020-01-03 PROCEDURE — 83605 ASSAY OF LACTIC ACID: CPT

## 2020-01-03 PROCEDURE — 87340 HEPATITIS B SURFACE AG IA: CPT

## 2020-01-03 PROCEDURE — 63600175 PHARM REV CODE 636 W HCPCS: Performed by: INTERNAL MEDICINE

## 2020-01-03 PROCEDURE — 82272 OCCULT BLD FECES 1-3 TESTS: CPT

## 2020-01-03 PROCEDURE — 25500020 PHARM REV CODE 255: Performed by: EMERGENCY MEDICINE

## 2020-01-03 PROCEDURE — 93306 TTE W/DOPPLER COMPLETE: CPT

## 2020-01-03 PROCEDURE — 99900035 HC TECH TIME PER 15 MIN (STAT)

## 2020-01-03 PROCEDURE — 89055 LEUKOCYTE ASSESSMENT FECAL: CPT

## 2020-01-03 PROCEDURE — 93005 ELECTROCARDIOGRAM TRACING: CPT

## 2020-01-03 PROCEDURE — 94660 CPAP INITIATION&MGMT: CPT

## 2020-01-03 PROCEDURE — 84484 ASSAY OF TROPONIN QUANT: CPT | Mod: 91

## 2020-01-03 PROCEDURE — 96365 THER/PROPH/DIAG IV INF INIT: CPT

## 2020-01-03 PROCEDURE — 63600175 PHARM REV CODE 636 W HCPCS: Performed by: EMERGENCY MEDICINE

## 2020-01-03 PROCEDURE — 96366 THER/PROPH/DIAG IV INF ADDON: CPT

## 2020-01-03 PROCEDURE — 86706 HEP B SURFACE ANTIBODY: CPT

## 2020-01-03 PROCEDURE — 27000221 HC OXYGEN, UP TO 24 HOURS

## 2020-01-03 PROCEDURE — 87040 BLOOD CULTURE FOR BACTERIA: CPT

## 2020-01-03 PROCEDURE — 87015 SPECIMEN INFECT AGNT CONCNTJ: CPT

## 2020-01-03 PROCEDURE — 36415 COLL VENOUS BLD VENIPUNCTURE: CPT

## 2020-01-03 PROCEDURE — 25000242 PHARM REV CODE 250 ALT 637 W/ HCPCS: Performed by: INTERNAL MEDICINE

## 2020-01-03 PROCEDURE — 87209 SMEAR COMPLEX STAIN: CPT

## 2020-01-03 PROCEDURE — 87046 STOOL CULTR AEROBIC BACT EA: CPT | Mod: 59

## 2020-01-03 RX ORDER — DILTIAZEM HYDROCHLORIDE 180 MG/1
180 CAPSULE, COATED, EXTENDED RELEASE ORAL DAILY
Status: DISCONTINUED | OUTPATIENT
Start: 2020-01-03 | End: 2020-01-08 | Stop reason: HOSPADM

## 2020-01-03 RX ORDER — TRAMADOL HYDROCHLORIDE 50 MG/1
50 TABLET ORAL EVERY 8 HOURS PRN
Status: DISCONTINUED | OUTPATIENT
Start: 2020-01-03 | End: 2020-01-08 | Stop reason: HOSPADM

## 2020-01-03 RX ORDER — OXYCODONE AND ACETAMINOPHEN 5; 325 MG/1; MG/1
1 TABLET ORAL ONCE
Status: COMPLETED | OUTPATIENT
Start: 2020-01-03 | End: 2020-01-03

## 2020-01-03 RX ORDER — CHOLESTYRAMINE 4 G/4.8G
1 POWDER, FOR SUSPENSION ORAL 2 TIMES DAILY
Status: DISCONTINUED | OUTPATIENT
Start: 2020-01-03 | End: 2020-01-08 | Stop reason: HOSPADM

## 2020-01-03 RX ORDER — ACETAMINOPHEN 325 MG/1
650 TABLET ORAL ONCE
Status: COMPLETED | OUTPATIENT
Start: 2020-01-03 | End: 2020-01-03

## 2020-01-03 RX ORDER — NAPROXEN SODIUM 220 MG/1
81 TABLET, FILM COATED ORAL DAILY
Status: DISCONTINUED | OUTPATIENT
Start: 2020-01-03 | End: 2020-01-08 | Stop reason: HOSPADM

## 2020-01-03 RX ORDER — LANOLIN ALCOHOL/MO/W.PET/CERES
400 CREAM (GRAM) TOPICAL DAILY
Status: DISCONTINUED | OUTPATIENT
Start: 2020-01-03 | End: 2020-01-08 | Stop reason: HOSPADM

## 2020-01-03 RX ORDER — ONDANSETRON 4 MG/1
4 TABLET, ORALLY DISINTEGRATING ORAL EVERY 6 HOURS PRN
Status: DISCONTINUED | OUTPATIENT
Start: 2020-01-03 | End: 2020-01-08 | Stop reason: HOSPADM

## 2020-01-03 RX ORDER — LOPERAMIDE HYDROCHLORIDE 2 MG/1
2 CAPSULE ORAL 4 TIMES DAILY PRN
Status: DISCONTINUED | OUTPATIENT
Start: 2020-01-03 | End: 2020-01-08 | Stop reason: HOSPADM

## 2020-01-03 RX ORDER — SEVELAMER HYDROCHLORIDE 800 MG/1
800 TABLET, FILM COATED ORAL
Status: DISCONTINUED | OUTPATIENT
Start: 2020-01-03 | End: 2020-01-08 | Stop reason: HOSPADM

## 2020-01-03 RX ORDER — FLUTICASONE FUROATE AND VILANTEROL 100; 25 UG/1; UG/1
1 POWDER RESPIRATORY (INHALATION) DAILY
Status: DISCONTINUED | OUTPATIENT
Start: 2020-01-03 | End: 2020-01-08 | Stop reason: HOSPADM

## 2020-01-03 RX ORDER — MORPHINE SULFATE 2 MG/ML
1 INJECTION, SOLUTION INTRAMUSCULAR; INTRAVENOUS ONCE
Status: COMPLETED | OUTPATIENT
Start: 2020-01-03 | End: 2020-01-03

## 2020-01-03 RX ORDER — CALCIUM ACETATE 667 MG/1
1334 CAPSULE ORAL
Status: DISCONTINUED | OUTPATIENT
Start: 2020-01-03 | End: 2020-01-08 | Stop reason: HOSPADM

## 2020-01-03 RX ORDER — IBUPROFEN 200 MG
16 TABLET ORAL
Status: DISCONTINUED | OUTPATIENT
Start: 2020-01-03 | End: 2020-01-08 | Stop reason: HOSPADM

## 2020-01-03 RX ORDER — HYDROCODONE BITARTRATE AND ACETAMINOPHEN 5; 325 MG/1; MG/1
1 TABLET ORAL EVERY 6 HOURS PRN
Status: DISCONTINUED | OUTPATIENT
Start: 2020-01-03 | End: 2020-01-03

## 2020-01-03 RX ORDER — IBUPROFEN 200 MG
24 TABLET ORAL
Status: DISCONTINUED | OUTPATIENT
Start: 2020-01-03 | End: 2020-01-08 | Stop reason: HOSPADM

## 2020-01-03 RX ORDER — SODIUM CHLORIDE 0.9 % (FLUSH) 0.9 %
10 SYRINGE (ML) INJECTION
Status: DISCONTINUED | OUTPATIENT
Start: 2020-01-03 | End: 2020-01-06

## 2020-01-03 RX ORDER — LEVETIRACETAM 500 MG/1
500 TABLET ORAL
Status: DISCONTINUED | OUTPATIENT
Start: 2020-01-03 | End: 2020-01-03

## 2020-01-03 RX ORDER — IODIXANOL 320 MG/ML
100 INJECTION, SOLUTION INTRAVASCULAR
Status: COMPLETED | OUTPATIENT
Start: 2020-01-03 | End: 2020-01-03

## 2020-01-03 RX ORDER — LOSARTAN POTASSIUM 25 MG/1
25 TABLET ORAL DAILY
Status: DISCONTINUED | OUTPATIENT
Start: 2020-01-03 | End: 2020-01-08 | Stop reason: HOSPADM

## 2020-01-03 RX ORDER — METOPROLOL SUCCINATE 50 MG/1
50 TABLET, EXTENDED RELEASE ORAL DAILY
Status: DISCONTINUED | OUTPATIENT
Start: 2020-01-03 | End: 2020-01-08 | Stop reason: HOSPADM

## 2020-01-03 RX ORDER — ISOSORBIDE MONONITRATE 30 MG/1
30 TABLET, EXTENDED RELEASE ORAL DAILY
Status: DISCONTINUED | OUTPATIENT
Start: 2020-01-03 | End: 2020-01-08 | Stop reason: HOSPADM

## 2020-01-03 RX ORDER — HEPARIN SODIUM 5000 [USP'U]/ML
5000 INJECTION, SOLUTION INTRAVENOUS; SUBCUTANEOUS EVERY 8 HOURS
Status: DISCONTINUED | OUTPATIENT
Start: 2020-01-03 | End: 2020-01-07

## 2020-01-03 RX ORDER — GLUCAGON 1 MG
1 KIT INJECTION
Status: DISCONTINUED | OUTPATIENT
Start: 2020-01-03 | End: 2020-01-08 | Stop reason: HOSPADM

## 2020-01-03 RX ORDER — SODIUM THIOSULFATE 250 MG/ML
25 INJECTION, SOLUTION INTRAVENOUS
Status: DISCONTINUED | OUTPATIENT
Start: 2020-01-03 | End: 2020-01-08 | Stop reason: HOSPADM

## 2020-01-03 RX ADMIN — SODIUM THIOSULFATE 25 G: 250 INJECTION, SOLUTION INTRAVENOUS at 01:01

## 2020-01-03 RX ADMIN — ISOSORBIDE MONONITRATE 30 MG: 30 TABLET, EXTENDED RELEASE ORAL at 02:01

## 2020-01-03 RX ADMIN — CHOLESTYRAMINE 4 G: 4 POWDER, FOR SUSPENSION ORAL at 02:01

## 2020-01-03 RX ADMIN — IODIXANOL 100 ML: 320 INJECTION, SOLUTION INTRAVASCULAR at 01:01

## 2020-01-03 RX ADMIN — MAGNESIUM OXIDE 400 MG: 400 TABLET ORAL at 02:01

## 2020-01-03 RX ADMIN — CALCIUM ACETATE 1334 MG: 667 CAPSULE ORAL at 05:01

## 2020-01-03 RX ADMIN — FLUTICASONE FUROATE AND VILANTEROL TRIFENATATE 1 PUFF: 100; 25 POWDER RESPIRATORY (INHALATION) at 08:01

## 2020-01-03 RX ADMIN — TRAMADOL HYDROCHLORIDE 50 MG: 50 TABLET, FILM COATED ORAL at 04:01

## 2020-01-03 RX ADMIN — OXYCODONE AND ACETAMINOPHEN 1 TABLET: 5; 325 TABLET ORAL at 11:01

## 2020-01-03 RX ADMIN — DILTIAZEM HYDROCHLORIDE 180 MG: 180 CAPSULE, COATED, EXTENDED RELEASE ORAL at 02:01

## 2020-01-03 RX ADMIN — VANCOMYCIN HYDROCHLORIDE 1500 MG: 1 INJECTION, POWDER, LYOPHILIZED, FOR SOLUTION INTRAVENOUS at 07:01

## 2020-01-03 RX ADMIN — ACETAMINOPHEN 650 MG: 325 TABLET ORAL at 06:01

## 2020-01-03 RX ADMIN — ASPIRIN 81 MG 81 MG: 81 TABLET ORAL at 02:01

## 2020-01-03 RX ADMIN — METOPROLOL SUCCINATE 50 MG: 50 TABLET, FILM COATED, EXTENDED RELEASE ORAL at 02:01

## 2020-01-03 RX ADMIN — LOSARTAN POTASSIUM 25 MG: 25 TABLET, FILM COATED ORAL at 02:01

## 2020-01-03 RX ADMIN — SEVELAMER HYDROCHLORIDE 800 MG: 800 TABLET, FILM COATED PARENTERAL at 05:01

## 2020-01-03 RX ADMIN — MORPHINE SULFATE 1 MG: 2 INJECTION, SOLUTION INTRAMUSCULAR; INTRAVENOUS at 06:01

## 2020-01-03 RX ADMIN — PIPERACILLIN AND TAZOBACTAM 4.5 G: 4; .5 INJECTION, POWDER, LYOPHILIZED, FOR SOLUTION INTRAVENOUS; PARENTERAL at 01:01

## 2020-01-03 NOTE — ASSESSMENT & PLAN NOTE
When patient came to ER yesterday CT scan was done which revealed a suspected left atrial mass/thrombus  2D echo has already been ordered.  Awaiting results

## 2020-01-03 NOTE — HPI
"Patient presented to ed with change in mental status after receiving nitrous oxide at her wound debridement appointment  family waited for changes to resolve but it did not  She has a similar presentation 12/20 with encephalopathy secondary to narcotics  In addition she was not wearing her oxygen and is normally on 3L at home, and had osat 89% in ER with cxr finding of right sided effusion  She has been on the "holiday dialysis schedule"   Per patient/family:  Patient states "michell doesn't know what shes talking about" when asked about confusion  And states she feels fine and would like to go home  She does admit to difficulty breathing feeling sob, denied chest pressure  No nausea or vomiting, er staff stated the patient had diarrhea  Patient reports having cough productive of white sputum  No fever or chills    Er course: ct abdomen was done which does not show colitis, it did capture atrial filling defect related to possible known thrombus and loculated effusion    Ros: a ten point ros with pertinent positives and negatives in hpi, otherwise negative    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    both legs    CHF (congestive heart failure)     Encounter for blood transfusion 03/2016    Gout     Hypertension     Mitral valve regurgitation     Osteoarthritis     Pancreatitis     Peripheral vascular disease     Peritoneal dialysis catheter in place     Pneumonia 09/09/2017    Renal failure      Past Surgical History:   Procedure Laterality Date    ACHILLES TENDON SURGERY Right     APPENDECTOMY      CARDIAC CATHETERIZATION  07/03/2017    CHOLECYSTECTOMY      heal surgery Right     HYSTERECTOMY      PARATHYROIDECTOMY      PARATHYROIDECTOMY  07/13/2017    PERITONEAL CATHETER INSERTION      RENAL BIOPSY      vocal cord nodule       Social History     Socioeconomic History    Marital status:      Spouse name: Not on file    Number of children: Not on " file    Years of education: Not on file    Highest education level: Not on file   Occupational History    Not on file   Social Needs    Financial resource strain: Not on file    Food insecurity:     Worry: Not on file     Inability: Not on file    Transportation needs:     Medical: Not on file     Non-medical: Not on file   Tobacco Use    Smoking status: Current Some Day Smoker     Packs/day: 0.50     Years: 45.00     Pack years: 22.50    Smokeless tobacco: Never Used   Substance and Sexual Activity    Alcohol use: No    Drug use: No    Sexual activity: Not on file   Lifestyle    Physical activity:     Days per week: Not on file     Minutes per session: Not on file    Stress: Not on file   Relationships    Social connections:     Talks on phone: Not on file     Gets together: Not on file     Attends Latter day service: Not on file     Active member of club or organization: Not on file     Attends meetings of clubs or organizations: Not on file     Relationship status: Not on file   Other Topics Concern    Not on file   Social History Narrative    Not on file     Family History   Problem Relation Age of Onset    Heart disease Sister     Early death Sister         heart as baby    Heart disease Maternal Grandfather     Diabetes Mother     Hypertension Mother     Heart failure Mother     Heart disease Mother     Arthritis Mother     Diabetes Father     Early death Sister         infant     Vitals:    01/02/20 2334 01/03/20 0002 01/03/20 0011 01/03/20 0030   BP:  133/85  118/79   Pulse: 104 106 102 107   Resp: (!) 25 (!) 29 (!) 24 (!) 24   Temp:       TempSrc:       SpO2: 100% 100% 100% 99%   Weight:       Height:         Gen: aox3 nad  heent ncat  Car: nl s1s2  Pulm: diffuse rales  Abd: soft ntnd  Ext: left thgh wound, tender, has seeped through dressing  Neuro: cn 2-12 grossly intact  Skin: wound left thigh

## 2020-01-03 NOTE — H&P
"[facility] Medicine  History & Physical    Patient Name: Griselda Neely  MRN: [id number]  Admission Date: 1/2/2020  Attending Physician: Keyur Bravo MD  Primary Care Provider: Kalie Neely MD         Patient information was obtained from patient and ER records.     Subjective:     Principal Problem:<principal problem not specified>    Chief Complaint:   Chief Complaint   Patient presents with    Altered Mental Status     pt brought in by EMS after family reports an increase in confusion afte receiving nitrous oxide today at wound care. pt seen here for same compaint on 12/20 with sme symptoms.         HPI:   Patient presented to ed with change in mental status after receiving nitrous oxide at her wound debridement appointment  family waited for changes to resolve but it did not  She has a similar presentation 12/20 with encephalopathy secondary to narcotics  In addition she was not wearing her oxygen and is normally on 3L at home, and had osat 89% in ER with cxr finding of right sided effusion  She has been on the "holiday dialysis schedule"   Per patient/family:  Patient states "michell doesn't know what shes talking about" when asked about confusion  And states she feels fine and would like to go home  She does admit to difficulty breathing feeling sob, denied chest pressure  No nausea or vomiting, er staff stated the patient had diarrhea  Patient reports having cough productive of white sputum  No fever or chills    Er course: ct abdomen was done which does not show colitis, it did capture atrial filling defect related to possible known thrombus and loculated effusion    Ros: a ten point ros with pertinent positives and negatives in hpi, otherwise negative    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    both legs    CHF (congestive heart failure)     Encounter for blood transfusion 03/2016    Gout     Hypertension     Mitral valve " regurgitation     Osteoarthritis     Pancreatitis     Peripheral vascular disease     Peritoneal dialysis catheter in place     Pneumonia 09/09/2017    Renal failure      Past Surgical History:   Procedure Laterality Date    ACHILLES TENDON SURGERY Right     APPENDECTOMY      CARDIAC CATHETERIZATION  07/03/2017    CHOLECYSTECTOMY      heal surgery Right     HYSTERECTOMY      PARATHYROIDECTOMY      PARATHYROIDECTOMY  07/13/2017    PERITONEAL CATHETER INSERTION      RENAL BIOPSY      vocal cord nodule       Social History     Socioeconomic History    Marital status:      Spouse name: Not on file    Number of children: Not on file    Years of education: Not on file    Highest education level: Not on file   Occupational History    Not on file   Social Needs    Financial resource strain: Not on file    Food insecurity:     Worry: Not on file     Inability: Not on file    Transportation needs:     Medical: Not on file     Non-medical: Not on file   Tobacco Use    Smoking status: Current Some Day Smoker     Packs/day: 0.50     Years: 45.00     Pack years: 22.50    Smokeless tobacco: Never Used   Substance and Sexual Activity    Alcohol use: No    Drug use: No    Sexual activity: Not on file   Lifestyle    Physical activity:     Days per week: Not on file     Minutes per session: Not on file    Stress: Not on file   Relationships    Social connections:     Talks on phone: Not on file     Gets together: Not on file     Attends Restoration service: Not on file     Active member of club or organization: Not on file     Attends meetings of clubs or organizations: Not on file     Relationship status: Not on file   Other Topics Concern    Not on file   Social History Narrative    Not on file     Family History   Problem Relation Age of Onset    Heart disease Sister     Early death Sister         heart as baby    Heart disease Maternal Grandfather     Diabetes Mother     Hypertension  Mother     Heart failure Mother     Heart disease Mother     Arthritis Mother     Diabetes Father     Early death Sister         infant     Vitals:    01/02/20 2334 01/03/20 0002 01/03/20 0011 01/03/20 0030   BP:  133/85  118/79   Pulse: 104 106 102 107   Resp: (!) 25 (!) 29 (!) 24 (!) 24   Temp:       TempSrc:       SpO2: 100% 100% 100% 99%   Weight:       Height:         Gen: aox3 nad  heent ncat  Car: nl s1s2  Pulm: diffuse rales  Abd: soft ntnd  Ext: left thgh wound, tender, has seeped through dressing  Neuro: cn 2-12 grossly intact  Skin: wound left thigh      Assessment/Plan:   Toxic encephalopathy  dm2  htn  esrd with volume overload   afib RVR- resolved in ER  pulm htn  Chronic respiratory failure  Seizure disorder  SHPT  Left leg wound  Troponin elevation  diarhea    - ct head reviewed, encephalopathy appears resolved  - dialysis in am  - troponin elevation is secondary to volume overload in setting of esrd with decreased troponin clearance, trend CE; ekg wnl afib rate 92, prolonged QTc  - echo  - io  - daily weight  - cardiac monitor  - monitor neur: check q4hr  - monitor am blood glucose, is not on anything  - resume bp meds  - was given cardizem bolus n er, resume home med of cardizem  - wound care consulted  - check cdiff  - supplemental oxygen 3L at home; despite this feels sob so bipap at night, untirl dialysis in am    VTE Risk Mitigation (From admission, onward)         Ordered     heparin (porcine) injection 5,000 Units  Every 8 hours      01/03/20 0318     IP VTE HIGH RISK PATIENT  Once      01/03/20 0318                   Keyur Bravo MD  Department of Hospital Medicine   [facility]

## 2020-01-03 NOTE — HOSPITAL COURSE
Patient is a 74-year-old  female with multiple medical comorbidities including ESRD on hemodialysis, paroxysmal atrial fibrillation not on any anticoagulation, chronic respiratory failure on home oxygen presented to the ED with chief complaint of altered mental status.  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  On admission patient was noted to have elevated lactic acid which has resolved with antibiotic use which have since been discontinued.  She was noted to have left atrial filling defect on CT abdomen concerning for possible left atrial thrombus.  This was confirmed with transthoracic as well as transesophageal echocardiogram.  She has been seen by Cardiology as well as Nephrology for her dialysis needs.  She has been initiated on anticoagulation with warfarin with enoxaparin bridge.  Patient and family member were instructed and provided with enoxaparin as well as warfarin teaching.  Hospital stay was also complicated by delayed post dialysis bleeding from her AV fistula site.  Seen by vascular surgery and underwent AV fistulogram with placement of basilic vein stent.  Home health has been arranged for INR.  Deemed medically stable to be discharged home.      Instructions provided to follow up with primary care physician as outpatient. Patient verbalized understanding and is aware to contact primary care physician or return to ED if new or worsening symptoms.    Physical exam on the day of discharge:  General: Patient resting comfortably in no acute distress.  Lungs: CTA. Good air entry.  Cor: Regular rate and rhythm. No murmurs. No pedal edema.  Abd: Soft. Nontender. Non-distended.  Neuro: A&O x3. Moving all 4 extremities equally  Ext: No clubbing. No cyanosis.

## 2020-01-03 NOTE — CARE UPDATE
This note also relates to the following rows which could not be included:  SpO2 - Cannot attach notes to unvalidated device data  Pulse - Cannot attach notes to unvalidated device data  Resp - Cannot attach notes to unvalidated device data       01/02/20 3183   Patient Assessment/Suction   Level of Consciousness (AVPU) alert   Labs   $ Was an ABG obtained? Arterial Puncture;ISTAT - Blood gas   $ Labs Tech Time 15 min

## 2020-01-03 NOTE — NURSING
Dr. Maria on unit making rounds, made aware that pt HR currently 1teens/120's, informed MD that pt usually has dialysis on Fridays, Dr. Ingram consulted, asked MD if he would like cardizem given or held at this time since awaiting dialysis. MD states to hold meds at this time, repeated back, verified.

## 2020-01-03 NOTE — PLAN OF CARE
01/03/20 1124   CORTEZ Message   Medicare Outpatient and Observation Notification regarding financial responsibility Given to patient/caregiver;Explained to patient/caregiver;Signed/date by patient/caregiver   Date CORTEZ was signed 01/03/20   Time CORTEZ was signed 1040     Introduced self and asked pt if ok to take few min to discuss Medicare form, and ok with pt. Explained that pt in observation status and Medicare wants to inform them of the MOON form, pt verbalized an understanding to form, form signed by pt and form scanned into CM notes. Copy made of signed form for pt and copy delivered to pt.

## 2020-01-03 NOTE — NURSING
Pt IV noted to be pulled out and bleeding, DC'd and drsg applied. Family at bedside states that pt requires ultrasound placement of IV access. Attempted to call IV nurse with no answer and voicemail full. Hanna, charge nurse called and made aware that pt requires ultrasound placement of IV, charge nurse states that she will call and see who is available. Safety maintained, call light in reach, family at bedside, will cont to moniter.

## 2020-01-03 NOTE — ASSESSMENT & PLAN NOTE
Loculated pleural effusion of the right side and very minimal left pleural effusion per CT scan   Patient today received dialysis  Will repeat chest x-ray two view tomorrow  Very unlikely to have empyema.  Most likely fluid overload

## 2020-01-03 NOTE — PROGRESS NOTES
"[facility] Medicine  Progress Note    Patient Name: Griselda Neely  MRN: [id number]  Patient Class: OP- Observation   Admission Date: 1/2/2020  Length of Stay: 0 days  Attending Physician: Keyur Brvao MD  Primary Care Provider: Kalie Neely MD        Subjective:     Principal Problem:Encephalopathy acute        HPI:  Patient presented to ed with change in mental status after receiving nitrous oxide at her wound debridement appointment  family waited for changes to resolve but it did not  She has a similar presentation 12/20 with encephalopathy secondary to narcotics  In addition she was not wearing her oxygen and is normally on 3L at home, and had osat 89% in ER with cxr finding of right sided effusion  She has been on the "holiday dialysis schedule"   Per patient/family:  Patient states "michell doesn't know what shes talking about" when asked about confusion  And states she feels fine and would like to go home  She does admit to difficulty breathing feeling sob, denied chest pressure  No nausea or vomiting, er staff stated the patient had diarrhea  Patient reports having cough productive of white sputum  No fever or chills    Er course: ct abdomen was done which does not show colitis, it did capture atrial filling defect related to possible known thrombus and loculated effusion    Ros: a ten point ros with pertinent positives and negatives in hpi, otherwise negative    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    both legs    CHF (congestive heart failure)     Encounter for blood transfusion 03/2016    Gout     Hypertension     Mitral valve regurgitation     Osteoarthritis     Pancreatitis     Peripheral vascular disease     Peritoneal dialysis catheter in place     Pneumonia 09/09/2017    Renal failure      Past Surgical History:   Procedure Laterality Date    ACHILLES TENDON SURGERY Right     APPENDECTOMY      " CARDIAC CATHETERIZATION  07/03/2017    CHOLECYSTECTOMY      heal surgery Right     HYSTERECTOMY      PARATHYROIDECTOMY      PARATHYROIDECTOMY  07/13/2017    PERITONEAL CATHETER INSERTION      RENAL BIOPSY      vocal cord nodule       Social History     Socioeconomic History    Marital status:      Spouse name: Not on file    Number of children: Not on file    Years of education: Not on file    Highest education level: Not on file   Occupational History    Not on file   Social Needs    Financial resource strain: Not on file    Food insecurity:     Worry: Not on file     Inability: Not on file    Transportation needs:     Medical: Not on file     Non-medical: Not on file   Tobacco Use    Smoking status: Current Some Day Smoker     Packs/day: 0.50     Years: 45.00     Pack years: 22.50    Smokeless tobacco: Never Used   Substance and Sexual Activity    Alcohol use: No    Drug use: No    Sexual activity: Not on file   Lifestyle    Physical activity:     Days per week: Not on file     Minutes per session: Not on file    Stress: Not on file   Relationships    Social connections:     Talks on phone: Not on file     Gets together: Not on file     Attends Catholic service: Not on file     Active member of club or organization: Not on file     Attends meetings of clubs or organizations: Not on file     Relationship status: Not on file   Other Topics Concern    Not on file   Social History Narrative    Not on file     Family History   Problem Relation Age of Onset    Heart disease Sister     Early death Sister         heart as baby    Heart disease Maternal Grandfather     Diabetes Mother     Hypertension Mother     Heart failure Mother     Heart disease Mother     Arthritis Mother     Diabetes Father     Early death Sister         infant     Vitals:    01/02/20 2334 01/03/20 0002 01/03/20 0011 01/03/20 0030   BP:  133/85  118/79   Pulse: 104 106 102 107   Resp: (!) 25 (!) 29 (!) 24  (!) 24   Temp:       TempSrc:       SpO2: 100% 100% 100% 99%   Weight:       Height:         Gen: aox3 nad  heent ncat  Car: nl s1s2  Pulm: diffuse rales  Abd: soft ntnd  Ext: left thgh wound, tender, has seeped through dressing  Neuro: cn 2-12 grossly intact  Skin: wound left thigh      Overview/Hospital Course:  Patient's mental status improved  She is alert and oriented  Patient had dialysis today  No other issues  Her lactic acid level were very high when she came to the hospital    Interval History:     Review of Systems   Constitutional: Positive for fatigue. Negative for activity change and appetite change.   HENT: Negative for congestion and dental problem.    Eyes: Negative for discharge and itching.   Respiratory: Negative for shortness of breath.    Cardiovascular: Negative for chest pain.   Gastrointestinal: Negative for abdominal distention and abdominal pain.   Endocrine: Negative for cold intolerance.   Genitourinary: Negative for difficulty urinating and dysuria.   Musculoskeletal: Negative for arthralgias and back pain.   Skin: Negative for color change.   Neurological: Negative for dizziness and facial asymmetry.   Hematological: Negative for adenopathy.   Psychiatric/Behavioral: Negative for agitation and behavioral problems.     Objective:     Vital Signs (Most Recent):  Temp: 97 °F (36.1 °C) (01/03/20 1429)  Pulse: 108 (01/03/20 1429)  Resp: 18 (01/03/20 1429)  BP: 123/70 (01/03/20 1429)  SpO2: 99 % (01/03/20 1429) Vital Signs (24h Range):  Temp:  [95.9 °F (35.5 °C)-98.8 °F (37.1 °C)] 97 °F (36.1 °C)  Pulse:  [] 108  Resp:  [11-98] 18  SpO2:  [89 %-100 %] 99 %  BP: (118-174)/(53-97) 123/70     Weight: 67.8 kg (149 lb 7.6 oz)  Body mass index is 24.87 kg/m².    Intake/Output Summary (Last 24 hours) at 1/3/2020 1520  Last data filed at 1/3/2020 1355  Gross per 24 hour   Intake 600 ml   Output 3400 ml   Net -2800 ml      Physical Exam   Constitutional: She is oriented to person, place, and  time. No distress.   HENT:   Right Ear: External ear normal.   Left Ear: External ear normal.   Eyes: Pupils are equal, round, and reactive to light. EOM are normal.   Neck: Neck supple.   Cardiovascular: Normal rate.   Pulmonary/Chest: Effort normal and breath sounds normal.   Reduced reduced air entry to bases   Abdominal: Soft. Bowel sounds are normal.   Musculoskeletal: Normal range of motion. She exhibits no edema.   Neurological: She is alert and oriented to person, place, and time.   Skin: Skin is warm.   Bandage intact on left thigh   Psychiatric: She has a normal mood and affect.   Nursing note and vitals reviewed.      Significant Labs:   CBC:   Recent Labs   Lab 01/02/20  2200   WBC 17.28*   HGB 10.1*   HCT 31.5*        CMP:   Recent Labs   Lab 01/02/20  2200      K 4.7   CL 90*   CO2 23   GLU 72   BUN 46*   CREATININE 6.6*   CALCIUM 7.3*   PROT 7.4   ALBUMIN 2.9*   BILITOT 1.3*   ALKPHOS 209*   AST 76*   ALT 23   ANIONGAP 27*   EGFRNONAA 5.7*       Significant Imaging: I have reviewed all pertinent imaging results/findings within the past 24 hours.      Assessment/Plan:      * Encephalopathy acute  Patient admitted with acute encephalopathy after she received laughing gas for calciphylaxis debridement of her left thigh  She has a history of acute encephalopathy after she received IV opiates for calciphylaxis debridement long time back  Per patient it is not a doctor and somebody else was doing the debridement  It will be great ful if somebody  planning to do the debridement in future it should be done on her dialysis days so that patient will receive dialysis after the procedure  Patient's white cell count is high upon admission.  Blood cultures has been ordered  Will start patient on IV vancomycin for time being  Also her lactic acid levels were high  This will be repeated tomorrow      Left atrial mass  When patient came to ER yesterday CT scan was done which revealed a suspected left  atrial mass/thrombus  2D echo has already been ordered.  Awaiting results       Loculated pleural effusion  Loculated pleural effusion of the right side and very minimal left pleural effusion per CT scan   Patient today received dialysis  Will repeat chest x-ray two view tomorrow  Very unlikely to have empyema.  Most likely fluid overload      PVD (peripheral vascular disease)  Stable chronic issue      Seizures  Resume antiseizure medication      Volume overload  Patient today had hemodialysis      Chronic respiratory failure  Patient normally on 3 L of oxygen at home      Pulmonary hypertension  Pulmonary hypertension per chart review  Echocardiogram has been ordered upon this admission      Troponin I above reference range  Expected in the background of end-stage renal disease/fluid overload  Patient today had dialysis      Wound of left leg  Patient had debridement of calciphylaxis of her left thigh yesterday      Atrial fibrillation  Continue home medications  Patient not on blood thinners    Hyperparathyroidism  Hyperparathyroidism is expected in end-stage renal disease      ESRD (end stage renal disease) on HD M,W, F  Patient today had hemodialysis  Stable issue      Tobacco dependence  Tobacco cessation counseling provided      HTN (hypertension)  Continue present medication  BP level stable        VTE Risk Mitigation (From admission, onward)         Ordered     heparin (porcine) injection 5,000 Units  Every 8 hours      01/03/20 0318     IP VTE HIGH RISK PATIENT  Once      01/03/20 0318                      Franky Maria MD  Department of Hospital Medicine   [facility]

## 2020-01-03 NOTE — ED PROVIDER NOTES
"Encounter Date: 1/2/2020       History     Chief Complaint   Patient presents with    Altered Mental Status     pt brought in by EMS after family reports an increase in confusion afte receiving nitrous oxide today at wound care. pt seen here for same compaint on 12/20 with sme symptoms.      HPI   Pt is a 73 YO F w/ several co morbidities including afib (no anticoagulation), ESRD (MWF), HFrEF (45%), calciphylaxis, COPD (on 3L O2 at home) presenting w/ c/o AMS. Per daughters pt attended wound care this morning and received nitrous oxide for calciphylaxis treatment- has received this in the past and became altered after administration as well. One hour after treatment began having generalized weakness and began "speaking gibberish." Pt had large volume diarrheal episode w/ brown stool upon arrival to ED, no hx of fever, urinary symptoms prior to arrival- pt does not make urine.  Pt has not taken medications today. Pt currently on dialysis holiday schedule and last received dialysis x3 days ago, Is scheduled for dialysis tomorrow morning. No hx of trauma. Baseline able to perform ADLs w/ assistance, identifies family and speaks in full sentences.    Review of patient's allergies indicates:  No Known Allergies  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    both legs    CHF (congestive heart failure)     Encounter for blood transfusion 03/2016    Gout     Hypertension     Mitral valve regurgitation     Osteoarthritis     Pancreatitis     Peripheral vascular disease     Peritoneal dialysis catheter in place     Pneumonia 09/09/2017    Renal failure      Past Surgical History:   Procedure Laterality Date    ACHILLES TENDON SURGERY Right     APPENDECTOMY      CARDIAC CATHETERIZATION  07/03/2017    CHOLECYSTECTOMY      heal surgery Right     HYSTERECTOMY      PARATHYROIDECTOMY      PARATHYROIDECTOMY  07/13/2017    PERITONEAL CATHETER INSERTION      RENAL BIOPSY      " vocal cord nodule       Family History   Problem Relation Age of Onset    Heart disease Sister     Early death Sister         heart as baby    Heart disease Maternal Grandfather     Diabetes Mother     Hypertension Mother     Heart failure Mother     Heart disease Mother     Arthritis Mother     Diabetes Father     Early death Sister         infant     Social History     Tobacco Use    Smoking status: Current Some Day Smoker     Packs/day: 0.50     Years: 45.00     Pack years: 22.50    Smokeless tobacco: Never Used   Substance Use Topics    Alcohol use: No    Drug use: No     Review of Systems   Unable to perform ROS: Mental status change   Respiratory: Positive for shortness of breath.    Gastrointestinal: Positive for diarrhea. Negative for nausea.   Psychiatric/Behavioral: Positive for confusion.       Physical Exam     Initial Vitals [01/02/20 2033]   BP Pulse Resp Temp SpO2   125/78 (!) 138 20 98.8 °F (37.1 °C) (!) 93 %      MAP       --         Physical Exam    Nursing note and vitals reviewed.  Constitutional: She appears well-developed and well-nourished. No distress.   HENT:   Head: Normocephalic and atraumatic.   Eyes: EOM are normal. Pupils are equal, round, and reactive to light.   Neck: Normal range of motion. Neck supple.   Cardiovascular: Normal rate, regular rhythm and intact distal pulses.   + JVD  Fistula RUE w/ palpable thrill   Pulmonary/Chest: No respiratory distress. She has no wheezes. She has rales in the right lower field and the left lower field.   Abdominal: Soft. She exhibits no distension. There is no tenderness. There is no guarding.   Musculoskeletal: Normal range of motion. She exhibits no edema or tenderness.   B/l LE pitting edema +3, calciphylaxis of L thigh medially 2x2 area w/o surrounding erythema and scant serosanguineous discharge on dressings   Neurological: She is alert and oriented to person, place, and time. She has normal strength. No cranial nerve deficit.    Pupils +2 and reactive b/l, follows some commands and speaking in sentences, moving all extremities equally w/ +5 strength b/l, no facial droop or slurring of speech. A+Ox2- disoriented to time.   Skin: Skin is warm and dry. Capillary refill takes less than 2 seconds.         ED Course   Critical Care  Date/Time: 1/7/2020 1:36 AM  Performed by: Rowena Gauthier MD  Authorized by: Homer Carlson MD   Direct patient critical care time: 20 minutes  Additional history critical care time: 5 minutes  Ordering / reviewing critical care time: 5 minutes  Consult with family critical care time: 5 minutes  Total critical care time (exclusive of procedural time) : 35 minutes  Critical care time was exclusive of separately billable procedures and treating other patients and teaching time.  Critical care was necessary to treat or prevent imminent or life-threatening deterioration of the following conditions: sepsis (dysrythmia - atrial fibrillation with RVR).  Critical care was time spent personally by me on the following activities: development of treatment plan with patient or surrogate, discussions with consultants, interpretation of cardiac output measurements, evaluation of patient's response to treatment, examination of patient, obtaining history from patient or surrogate, ordering and performing treatments and interventions, ordering and review of laboratory studies, ordering and review of radiographic studies, re-evaluation of patient's condition and review of old charts.        Labs Reviewed   CBC W/ AUTO DIFFERENTIAL - Abnormal; Notable for the following components:       Result Value    WBC 17.28 (*)     RBC 3.63 (*)     Hemoglobin 10.1 (*)     Hematocrit 31.5 (*)     RDW 17.4 (*)     Immature Granulocytes 1.5 (*)     Gran # (ANC) 15.0 (*)     Immature Grans (Abs) 0.26 (*)     Gran% 86.5 (*)     Lymph% 6.2 (*)     All other components within normal limits   COMPREHENSIVE METABOLIC PANEL - Abnormal; Notable for  the following components:    Chloride 90 (*)     BUN, Bld 46 (*)     Creatinine 6.6 (*)     Calcium 7.3 (*)     Albumin 2.9 (*)     Total Bilirubin 1.3 (*)     Alkaline Phosphatase 209 (*)     AST 76 (*)     Anion Gap 27 (*)     eGFR if  6.6 (*)     eGFR if non  5.7 (*)     All other components within normal limits   TROPONIN I - Abnormal; Notable for the following components:    Troponin I 0.143 (*)     All other components within normal limits    Narrative:      trop  critical result(s) called and verbal readback obtained from   jess mike rn [facility] 01/02/2020 23:22   B-TYPE NATRIURETIC PEPTIDE - Abnormal; Notable for the following components:    BNP >4,500 (*)     All other components within normal limits   PROTIME-INR - Abnormal; Notable for the following components:    PT 16.6 (*)     All other components within normal limits   SALICYLATE LEVEL - Abnormal; Notable for the following components:    Salicylate Lvl <4.0 (*)     All other components within normal limits   LIPASE - Abnormal; Notable for the following components:    Lipase 71 (*)     All other components within normal limits   LACTIC ACID, PLASMA - Abnormal; Notable for the following components:    Lactate (Lactic Acid) 5.2 (*)     All other components within normal limits    Narrative:       lactic acid critical result(s) called and verbal readback obtained   from dory deshpande rn [facility] 01/03/2020 00:50   PHOSPHORUS - Abnormal; Notable for the following components:    Phosphorus 8.0 (*)     All other components within normal limits   ISTAT PROCEDURE - Abnormal; Notable for the following components:    POC PO2 57 (*)     POC SATURATED O2 90 (*)     All other components within normal limits   ISTAT LACTATE - Abnormal; Notable for the following components:    POC Lactate 4.61 (*)     All other components within normal limits   CULTURE, STOOL   CLOSTRIDIUM DIFFICILE   CULTURE, BLOOD   CULTURE, BLOOD    ACETAMINOPHEN LEVEL   ALCOHOL,MEDICAL (ETHANOL)   LIPASE   LACTIC ACID, PLASMA   MAGNESIUM   PHOSPHORUS   MAGNESIUM   STOOL EXAM-OVA,CYSTS,PARASITES   WBC, STOOL   OCCULT BLOOD X 1, STOOL   POCT LACTATE     EKG Readings: (Independently Interpreted)   Initial Reading: No STEMI. Previous EKG: Compared with most recent EKG Rhythm: Atrial Fibrillation. Heart Rate: 140. Conduction: Normal. ST Segments: Normal ST Segments. T Waves: Normal. Clinical Impression: Atrial Fibrillation with RVR       Imaging Results          CT Abdomen Pelvis With Contrast (Final result)  Result time 01/03/20 01:29:35    Final result by Barbara Garzon MD (01/03/20 01:29:35)                 Narrative:        Exam: CT OF THE ABDOMEN/PELVIS WITH IV CONTRAST    Clinical data: Diarrhea. Patient will be dialyzed today. History of gallbladder  surgery, appendix, hysterectomy, renal biopsy.    Technique: Direct contiguous axial CT images were acquired through the abdomen  and pelvis with intravenous contrast using soft tissue and bone algorithms. Oral  contrast was not administered. Reformatted/MPR images were performed. Contrast  used: Yes. Amount: 100 mL. Radiation dose: CTDIvol = 7.50 mGy, DLP = 356.60 mGy  x cm.    Limitations: Lack of oral contrast limits evaluation of the bowel loops.    Prior Studies: No prior studies submitted.    Findings: Lung bases: Loculated mild-to-moderate right and minimal left pleural  effusions with underlying bibasilar atelectasis. Moderate cardiomegaly.  4.5 cm  filling defect in the left atrium, mass versus thrombus.    Liver:  Mildly bulky right lobe of liver noted. There is reflux of contrast seen  hepatic veins noted, possibly due to cardiac overload.  No definite evidence of  of mass. No evidence of dilated ducts.    Gallbladder: Not visualized, possibly surgically removed.  Clinical operative  details are not available.    Spleen:  Grossly unremarkable.    Pancreas/adrenal glands:   Grossly unremarkable size,  contour and density.    Kidneys:   In anatomic position. Grossly unremarkable renal size, contour and  density. No renal or ureteral calculi. No evidence of a renal mass or cyst.  Mild bilateral renal cortical atrophy.  Perinephric space is unremarkable.    Retroperitoneum: No enlarged retroperitoneal lymphadenopathy.  Atherosclerotic  calcification is seen involving the aorta, its branches and its bifurcation.  IVC appear unremarkable.    Peritoneal cavity:  No evidence of free air.  Mild amount of ascites is noted.    Gastrointestinal tract: No obstruction.  Scattered colonic diverticulosis  without evidence of acute diverticulitis.  Small hiatal hernia.    Appendix:  Unremarkable    Pelvis:  Solid and hollow viscera grossly unremarkable.  Post-hysterectomy  status noted.  Left inguinal lymph nodes measure up to 1.4 cm short axis.    Osseous structures:  No acute or destructive bony process identified.  Edema of  the body wall.    IMPRESSION:    1.  Moderate cardiomegaly with loculated right and very minimal left pleural  effusions along with underlying bibasal atelectasis.  Possibility of changes of  hydrostatic pulmonary edema.  Clinical correlation recommended. Mild amount of  ascites.      2.  4.5 cm filling defect in the left atrial wall may be mass or thrombus.  Echocardiogram is recommended.    3.  Reflux of contrast within the hepatic veins, possibly due to cardiac  overload.  Clinical correlation recommended.    4.  Scattered colonic diverticulosis without evidence of acute diverticulitis.  Small hiatal hernia.    5.  Diffuse atherosclerotic wall calcifications involving aorta, its branches  and its bifurcation.  CT aortogram would be helpful for evaluation, if  clinically indicated.    Recommendation: Follow up as clinically indicated.    All CT scans at this facility utilize dose modulation, iterative reconstruction,  and/or weight based dosing when appropriate to reduce radiation dose to as low  as  reasonably achievable.      Electronically Signed by SOILA NAIDU MD at 1/3/2020 3:17:01 AM                             CT Head Without Contrast (Final result)  Result time 01/02/20 22:14:34    Final result by Jordan Small MD (01/02/20 22:14:34)                 Narrative:        Exam: CT OF THE BRAIN WITHOUT CONTRAST    Clinical data: Altered mental status; no trauma; pain behind right ear.    Technique: Contiguous axial images are obtained from the skull base to vertex  without intravenous contrast. Radiation dose: CTDIvol = 115.00 mGy, DLP =  1975.20 mGy x cm.    Prior studies: CT scan of the brain dated 12/20/2019, image only.    Findings:    No acute intracranial abnormality is present.  There are few hypodensities seen  involving right basal ganglia and left thalamus probably old small infarct.  Mild diffuse brain parenchymal atrophic changes.  Mild periventricular chronic  microangiopathic ischemic changes.  No evidence of acute cortical infarction,  hemorrhage, mass or mass effect. No hydrocephalus or abnormal extra-axial fluid  collections are present. The posterior fossa is unremarkable.    The skull base and calvarium are intact. The included portions of the paranasal  sinuses and mastoid air cells are clear.    IMPRESSION:    No acute intracranial abnormality observed.    There are few hypodensities involving right basal ganglia and left thalamus  probably old lacunar infarcts.    Mild diffuse brain parenchymal atrophic changes.    4.  Mild periventricular chronic microangiopathic ischemic changes.    Recommendation:    Follow up as clinically indicated.    All CT scans at this facility utilize dose modulation, iterative reconstruction,  and/or weight based dosing when appropriate to reduce radiation dose to as low  as reasonably achievable.      Electronically Signed by JORDAN SMALL MD at 1/3/2020 12:38:51 AM                             X-Ray Chest AP Portable (In process)                                                   Clinical Impression:     Resident MDM PGY-3  Griselda Neely is a 74 y.o. YO female presenting w/ c/o AMS x1 day s/p nitrous oxide treatment this AM. X 3 episodes of encephalopathy in the past month related to medications and prior nitrous treatment. Pt currently in afib RVR, has not taken home metoprolol and diltiazem today, not currently on anticoagulation per med list. VSS, Afebrile, non toxic appearing.   Differentials include uremia, medication side effect, electrolyte abnormality, CVA- outside window, infectious origin, Atypical ACS  Pending labs and imaging. Given diltiazem 10mg w/ improvement in afib RVR to 120s, repeated dose of 15mg diltiazem. EKG interpretation above.  Will continue to monitor and reevaluate for clinical changes.     Angela Segovia MD MPH  [facility] PGY-3  10:45 PM 1/2/2020    Reevaluation 11:17 PM 1/2/2020 -  HR improved to low 100s, pending repeat EKG. Labs significant for elevated WBCs 17.28 w/ shift, lipase and lactic acid pending. Trop 0.143 at baseline. BUN 46, potassium 4.7, Tbili 1.3 w/ mild elevation of AST 76.Given large diarrhea upon arrival will send C diff and stool studies. Pending CT a/p. Blood cultures sent. Placed on home 3L O2 given hypoxia on VBG. CT head w/o ICH, Chronic ischemic disease- pending radiology read. CXR w/ enlarged cardiac boarder, fluid in R middle fissure and pleural effusion seen which has been present on prior CXRs. Anticipate admission.     Angela Segovia MD MPH  [facility] PGY-3    Reevaluation 3:16 AM 1/3/2020 -  Lactate 5.6 Ct A/P w/o obvious source of symptoms. Pt w/ improving mental status during time in ED. Pt will need dialysis given contrast admin. Will consult to hospital med for admission.    Angela Segovia MD MPH  [facility] PGY-3    Attending Attestation:    I saw and examined the patient.  I have reviewed and agree with the resident's findings, including all  diagnostic interpretations and plans as written.  I was present for the key portions of the separately billed procedures.    75 yo woman with multiple co morbidities returns to the ED with AMS after receiving nitrous oxide for debridement of chronic calciphylaxis wounds. In a fib with rvr upon arrival that resolved with diltiazem pushes, but otherwise HD stable and afebrile. Labs concerning for elevated wbc and lactic acid. No clear source at this time althouth patient does have large pleural effusion which could be obscuring PNA. Also with two episodes of large volume diarrhea. CT head grossly wnl. CT A/P with right sided pleural effusion with loculations, LA mass vs thrombus but no other acute findings. Stool studies sent and the patient covered with broad spectrum abx. No 30 cc/kg bolus as no severe sepsis and HD patient who is already volume overloaded. Patient has cleared during ED stay - no clinical sx of meningoencephalitis. Patient will be admitted for monitoring, abx and cards eval for possible atrial thrombus vs mass.    Rowena Gauthier MD  Emergency Medicine  01/07/2020 1:35 AM              ICD-10-CM ICD-9-CM   1. Encephalopathy acute G93.40 348.30   2. Shortness of breath R06.02 786.05   3. Rales R09.89 786.7   4. Calciphylaxis with nonhealing ulcer of leg E83.59 707.10    L97.909    5. Atrial fibrillation, unspecified type I48.91 427.31   6. A-fib I48.91 427.31   7. Leukocytosis, unspecified type D72.829 288.60   8. Elevated bilirubin R17 277.4                             Angela Segovia MD  Resident  01/03/20 0318       Rowena Gauthier MD  01/07/20 0135       Rowena Gauthier MD  01/07/20 0137

## 2020-01-03 NOTE — NURSING
Arrived back to unit via bed with extender and nurse at side. Cassandra, dialysis nurse, states she took off 3 L from pt. NAD. Safety maintained, bed alarm intact, call light in reach, will cont to moniter.

## 2020-01-03 NOTE — NURSING
74 yr old female family at bedside        01/03/20 1723        Wound 01/03/20 1723 Other (comment) medial Thigh #1   Date First Assessed/Time First Assessed: 01/03/20 1723   Pre-existing: Yes  Primary Wound Type: (c) Other (comment)  Side: Left  Orientation: (c) medial  Location: Thigh  Wound/PI Number (optional): #1   Wound Image     Dressing Appearance Intact;Moist drainage   Drainage Amount Scant   Drainage Characteristics/Odor Sanguineous   Appearance Pink;Red;Black   Tissue loss description Partial thickness   Periwound Area Intact;Dry   Wound Edges Irregular   Wound Length (cm)   (2x2x.6  2x2x.5   1x2.5x.3)   Care Cleansed with:;Antimicrobial agent   Dressing Calcium alginate;Abd pad   Lt inner thigh clean with chlohrexidine/ns  pat dry  cover open wound with aquacel ag, abd pad and paper tape. Very three days

## 2020-01-03 NOTE — PLAN OF CARE
01/03/20 0811   Patient Assessment/Suction   Level of Consciousness (AVPU) alert   Respiratory Effort Normal;Unlabored   All Lung Fields Breath Sounds clear;diminished   PRE-TX-O2   O2 Device (Oxygen Therapy) nasal cannula   $ Is the patient on Low Flow Oxygen? Yes   Flow (L/min) 3   SpO2 98 %   Pulse Oximetry Type Intermittent   $ Pulse Oximetry - Multiple Charge Pulse Oximetry - Multiple   Pulse 78   Resp 17   Inhaler   $ Inhaler Charges MDI (Metered Dose Inahler) Treatment  (breo)   Daily Review of Necessity (Inhaler) completed   Respiratory Treatment Status (Inhaler) given;mouth rinsed post treatment   Treatment Route (Inhaler) mouthpiece   Patient Position (Inhaler) HOB elevated   Post Treatment Assessment (Inhaler) breath sounds improved   Signs of Intolerance (Inhaler) none   Preset CPAP/BiPAP Settings   Mode Of Delivery BiPAP;Standby   $ CPAP/BiPAP Daily Charge BiPAP/CPAP Daily

## 2020-01-03 NOTE — SUBJECTIVE & OBJECTIVE
Interval History:     Review of Systems   Constitutional: Positive for fatigue. Negative for activity change and appetite change.   HENT: Negative for congestion and dental problem.    Eyes: Negative for discharge and itching.   Respiratory: Negative for shortness of breath.    Cardiovascular: Negative for chest pain.   Gastrointestinal: Negative for abdominal distention and abdominal pain.   Endocrine: Negative for cold intolerance.   Genitourinary: Negative for difficulty urinating and dysuria.   Musculoskeletal: Negative for arthralgias and back pain.   Skin: Negative for color change.   Neurological: Negative for dizziness and facial asymmetry.   Hematological: Negative for adenopathy.   Psychiatric/Behavioral: Negative for agitation and behavioral problems.     Objective:     Vital Signs (Most Recent):  Temp: 97 °F (36.1 °C) (01/03/20 1429)  Pulse: 108 (01/03/20 1429)  Resp: 18 (01/03/20 1429)  BP: 123/70 (01/03/20 1429)  SpO2: 99 % (01/03/20 1429) Vital Signs (24h Range):  Temp:  [95.9 °F (35.5 °C)-98.8 °F (37.1 °C)] 97 °F (36.1 °C)  Pulse:  [] 108  Resp:  [11-98] 18  SpO2:  [89 %-100 %] 99 %  BP: (118-174)/(53-97) 123/70     Weight: 67.8 kg (149 lb 7.6 oz)  Body mass index is 24.87 kg/m².    Intake/Output Summary (Last 24 hours) at 1/3/2020 1520  Last data filed at 1/3/2020 1355  Gross per 24 hour   Intake 600 ml   Output 3400 ml   Net -2800 ml      Physical Exam   Constitutional: She is oriented to person, place, and time. No distress.   HENT:   Right Ear: External ear normal.   Left Ear: External ear normal.   Eyes: Pupils are equal, round, and reactive to light. EOM are normal.   Neck: Neck supple.   Cardiovascular: Normal rate.   Pulmonary/Chest: Effort normal and breath sounds normal.   Reduced reduced air entry to bases   Abdominal: Soft. Bowel sounds are normal.   Musculoskeletal: Normal range of motion. She exhibits no edema.   Neurological: She is alert and oriented to person, place, and time.    Skin: Skin is warm.   Bandage intact on left thigh   Psychiatric: She has a normal mood and affect.   Nursing note and vitals reviewed.      Significant Labs:   CBC:   Recent Labs   Lab 01/02/20  2200   WBC 17.28*   HGB 10.1*   HCT 31.5*        CMP:   Recent Labs   Lab 01/02/20  2200      K 4.7   CL 90*   CO2 23   GLU 72   BUN 46*   CREATININE 6.6*   CALCIUM 7.3*   PROT 7.4   ALBUMIN 2.9*   BILITOT 1.3*   ALKPHOS 209*   AST 76*   ALT 23   ANIONGAP 27*   EGFRNONAA 5.7*       Significant Imaging: I have reviewed all pertinent imaging results/findings within the past 24 hours.

## 2020-01-03 NOTE — PROGRESS NOTES
Pt received from ER. Patient is awake alert and oriented x3. Bipap is on.       01/03/20 0436   Vital Signs   Temp 97.5 °F (36.4 °C)   Temp src Oral   Pulse 84   Resp (!) 25   SpO2 97 %   Pulse Oximetry Type Intermittent   Oxygen Concentration (%) 32   O2 Device (Oxygen Therapy) BiPAP   BP (!) 134/90

## 2020-01-03 NOTE — CARE UPDATE
01/02/20 2144   Patient Assessment/Suction   Level of Consciousness (AVPU) alert   Labs   $ Was an ABG obtained? Arterial Puncture;ISTAT - Blood gas   $ Labs Tech Time 15 min   Critical Value Communication   Date Result Received 01/02/20   Time Result Received 2158   Resulting Department of Critical Value RESP   Who communicated critical value from resulting department? DBlack   Critical Test #1 PaO2   Critical Test #1 Result 57   Name of Notified Physician/Designee md Bakari   Date Notified 01/02/20   Time Notified 2159   Read Back Verification Yes

## 2020-01-03 NOTE — ASSESSMENT & PLAN NOTE
Patient admitted with acute encephalopathy after she received laughing gas for calciphylaxis debridement of her left thigh  She has a history of acute encephalopathy after she received IV opiates for calciphylaxis debridement long time back  Per patient it is not a doctor and somebody else was doing the debridement  It will be great ful if somebody  planning to do the debridement in future it should be done on her dialysis days so that patient will receive dialysis after the procedure  Patient's white cell count is high upon admission.  Blood cultures has been ordered  Will start patient on IV vancomycin for time being  Also her lactic acid levels were high  This will be repeated tomorrow

## 2020-01-03 NOTE — CONSULTS
Consult Note  Nephrology    Griselda Neely  female  74 y.o.  Consult Requested By: Keyur Bravo MD  Reason for Consult:  ESRD    SUBJECTIVE:     History of Present Illness:  Mrs. Griselda Neely is a 74-year-old woman with multiple medical problems including ESRD for which we follow her.  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  She received nitrous oxide and was confused when she went home.  Despite her family waiting for her to improve she did not and they brought her in to the emergency department.  She has been been hospitalized frequently with altered mental status after receiving pain medicines.  Nephrology has been consulted to assist with her ESRD management.    Review Of Systems:  GEN:  No fever, chills, night sweats, decreased intake  HEENT: No blurry vision, glasses, floaters, hearing defects, sore throat..  CV:  No CP, palpitations, edema, ARGUELLO, orthopnea, PND  PULM:  No Cough, SOB, hemoptysis, wheezing.  GI:  No nausea, vomiting, constipation, diarrhea, melena, hematochezia, abdominal pain.  :  No dysuria, urgency, frequency, cloudy urine, red urine, dribbling, double voiding, incontinence, hx of stones.  NEURO: Confusion and altered mental status.  SKIN:  Calciphylaxis ulcer in her left thigh.  MS:  No arthralgias, joint swelling, redness or warmth.    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    both legs    CHF (congestive heart failure)     Encounter for blood transfusion 03/2016    Gout     Hypertension     Mitral valve regurgitation     Osteoarthritis     Pancreatitis     Peripheral vascular disease     Peritoneal dialysis catheter in place     Pneumonia 09/09/2017    Renal failure      Past Surgical History:   Procedure Laterality Date    ACHILLES TENDON SURGERY Right     APPENDECTOMY      CARDIAC CATHETERIZATION  07/03/2017    CHOLECYSTECTOMY      heal surgery Right      HYSTERECTOMY      PARATHYROIDECTOMY      PARATHYROIDECTOMY  07/13/2017    PERITONEAL CATHETER INSERTION      RENAL BIOPSY      vocal cord nodule       Family History   Problem Relation Age of Onset    Heart disease Sister     Early death Sister         heart as baby    Heart disease Maternal Grandfather     Diabetes Mother     Hypertension Mother     Heart failure Mother     Heart disease Mother     Arthritis Mother     Diabetes Father     Early death Sister         infant     Social History     Tobacco Use    Smoking status: Current Some Day Smoker     Packs/day: 0.50     Years: 45.00     Pack years: 22.50    Smokeless tobacco: Never Used   Substance Use Topics    Alcohol use: No    Drug use: No      Review of patient's allergies indicates:  No Known Allergies   Prior to Admission medications    Medication Sig Start Date End Date Taking? Authorizing Provider   fluticasone furoate-vilanterol (BREO) 100-25 mcg/dose diskus inhaler Inhale 1 puff into the lungs once daily. Controller   Yes Historical Provider, MD   ondansetron (ZOFRAN-ODT) 4 MG TbDL Take 1 tablet (4 mg total) by mouth every 6 (six) hours as needed. 10/3/19  Yes Kalie Neely MD   traMADol (ULTRAM) 50 mg tablet Take 1 tablet (50 mg total) by mouth 3 (three) times a week. 3 TIMES A WEEK AS NEEDED DURING HEMODIALYSIS 10/4/19  Yes Kalie Neely MD   aspirin 81 MG Chew Take 81 mg by mouth once daily.     Historical Provider, MD   calcium acetate (PHOSLO) 667 mg capsule Take 1,334 mg by mouth 3 (three) times daily with meals.    Historical Provider, MD   diltiaZEM (CARDIZEM CD) 180 MG 24 hr capsule Take 180 mg by mouth once daily.    Historical Provider, MD   isosorbide mononitrate (IMDUR) 30 MG 24 hr tablet Take 30 mg by mouth once daily.    Historical Provider, MD   levETIRAcetam (KEPPRA) 500 MG Tab Take 500 mg by mouth every Mon, Wed, Fri. After diaylsis    Historical Provider, MD   losartan (COZAAR) 25 MG tablet Take 25 mg  by mouth once daily.    Historical Provider, MD   magnesium oxide (MAG-OX) 400 mg (241.3 mg magnesium) tablet Take 400 mg by mouth once daily.    Historical Provider, MD   metoprolol succinate (TOPROL-XL) 50 MG 24 hr tablet Take 1 tablet (50 mg total) by mouth once daily.  Patient taking differently: Take 50 mg by mouth once daily.  8/10/19 8/9/20  Daren Lea MD   oxyCODONE-acetaminophen (PERCOCET) 5-325 mg per tablet Take 1 tablet by mouth daily as needed for Pain (with dialysis).     Historical Provider, MD   sevelamer HCl (RENAGEL) 800 MG Tab Take 1 tablet (800 mg total) by mouth 3 (three) times daily with meals. 10/1/19 10/31/19  Jhoan Fuentes MD              aspirin  81 mg Oral Daily    calcium acetate  1,334 mg Oral TID WM    cholestyramine-aspartame  1 packet Oral BID    diltiaZEM  180 mg Oral Daily    fluticasone furoate-vilanterol  1 puff Inhalation Daily    heparin (porcine)  5,000 Units Subcutaneous Q8H    isosorbide mononitrate  30 mg Oral Daily    levETIRAcetam  500 mg Oral Every Mon, Wed, Fri    losartan  25 mg Oral Daily    magnesium oxide  400 mg Oral Daily    metoprolol succinate  50 mg Oral Daily    sevelamer HCl  800 mg Oral TID WM     dextrose 50%, dextrose 50%, glucagon (human recombinant), glucose, glucose, loperamide, ondansetron, sodium chloride 0.9%       OBJECTIVE:     Vital Signs (Most Recent)  Temp: 97.6 °F (36.4 °C) (01/03/20 0740)  Pulse: 78 (01/03/20 0811)  Resp: 17 (01/03/20 0811)  BP: (!) 158/95 (01/03/20 0740)  SpO2: 98 % (01/03/20 0811)    Vital Signs Range (Last 24H):  Temp:  [97.5 °F (36.4 °C)-98.8 °F (37.1 °C)]   Pulse:  []   Resp:  [11-98]   BP: (118-158)/(75-97)   SpO2:  [89 %-100 %]     Physical Exam:  General: On dialysis. Awake and alert. In no distress.  HEENT: No scleral icterus, MM moist.   NECK:  JVD. Supple,  No swelling, No masses.  CV:  Irregular rhythm with tachycardia.  PULM:  Decreased breath sounds.  Clear without crackles, rhonchi,  wheezes.  ABD:  Soft, nl BS, NT, ND.  EXTR:  No edema, clubbing, cyanosis.   NEURO: Grossly Intact.  SKIN:  Left inner thigh with bandage wound and no surrounding erythema.  Very tender.   MS:  No joint effusions.   PSYCH: Normal Mood.    Laboratory:  Recent Labs   Lab 01/02/20  2200      K 4.7   CL 90*   CO2 23   BUN 46*   CREATININE 6.6*   GLU 72   CALCIUM 7.3*   PHOS 8.0*       Recent Labs   Lab 01/02/20  2200   WBC 17.28*   HGB 10.1*   HCT 31.5*          Active Hospital Problems    Diagnosis  POA    Encephalopathy acute [G93.40]  Yes    Seizures [R56.9]  Yes     Chronic    PVD (peripheral vascular disease) [I73.9]  Yes     Chronic    Volume overload [E87.70]  Yes    Chronic respiratory failure [J96.10]  Yes     Chronic     3 L O2 at home      Pulmonary hypertension [I27.20]  Yes     Chronic    Troponin I above reference range [R79.89]  Yes    Wound of left leg [S81.802A]  Unknown    Atrial fibrillation [I48.91]  Yes     Chronic    Hyperparathyroidism [E21.3]  Yes    ESRD (end stage renal disease) on HD M,W, F [N18.6]  Yes     Chronic    Tobacco dependence [F17.200]  Yes     Chronic    HTN (hypertension) [I10]  Yes     Chronic    DM (diabetes mellitus) [E11.9]  Yes     Chronic      Resolved Hospital Problems   No resolved problems to display.       ASSESSMENT/PLAN:     1.  Altered mental status  Possible sepsis with elevated lactate  Post debridement of calciphylaxis wound?  Mental status clear now  Defer to Hospital Medicine    2.  ESRD  Dialysis now on her usual schedule  No acute issues    3.  Secondary hyperparathyroidism  Give calcium binders despite the calciphylaxis given hypocalcemia and severe hyperphosphatemia  Monitor calcium and phosphorus    4.  Calciphylaxis  Sodium thiosulfate post dialysis    5.  Hypertension  Acceptable for now  Continue home meds    6.  Anemia  At goal for ESRD  No need for DONNY's    Thank you Dr. Bravo for allowing our group to share in Mrs. Neely'  is care.  We will follow up with you.    Joseph Ingram M.D.

## 2020-01-03 NOTE — ASSESSMENT & PLAN NOTE
Expected in the background of end-stage renal disease/fluid overload  Patient today had dialysis

## 2020-01-04 PROBLEM — I51.3 LEFT ATRIAL THROMBUS: Status: ACTIVE | Noted: 2020-01-03

## 2020-01-04 PROBLEM — G93.40 ENCEPHALOPATHY ACUTE: Status: RESOLVED | Noted: 2020-01-03 | Resolved: 2020-01-04

## 2020-01-04 LAB
ALBUMIN SERPL BCP-MCNC: 2.7 G/DL (ref 3.5–5.2)
ALP SERPL-CCNC: 167 U/L (ref 55–135)
ALT SERPL W/O P-5'-P-CCNC: 28 U/L (ref 10–44)
ANION GAP SERPL CALC-SCNC: 22 MMOL/L (ref 8–16)
AST SERPL-CCNC: 50 U/L (ref 10–40)
BILIRUB SERPL-MCNC: 1 MG/DL (ref 0.1–1)
BUN SERPL-MCNC: 33 MG/DL (ref 8–23)
CALCIUM SERPL-MCNC: 7.6 MG/DL (ref 8.7–10.5)
CHLORIDE SERPL-SCNC: 93 MMOL/L (ref 95–110)
CO2 SERPL-SCNC: 22 MMOL/L (ref 23–29)
CREAT SERPL-MCNC: 5 MG/DL (ref 0.5–1.4)
ERYTHROCYTE [DISTWIDTH] IN BLOOD BY AUTOMATED COUNT: 17.7 % (ref 11.5–14.5)
EST. GFR  (AFRICAN AMERICAN): 9.2 ML/MIN/1.73 M^2
EST. GFR  (NON AFRICAN AMERICAN): 8 ML/MIN/1.73 M^2
GLUCOSE SERPL-MCNC: 67 MG/DL (ref 70–110)
HBV SURFACE AB SER QL: NON REACTIVE
HBV SURFACE AG SERPL QL IA: NEGATIVE
HCT VFR BLD AUTO: 28.9 % (ref 37–48.5)
HGB BLD-MCNC: 9.2 G/DL (ref 12–16)
LACTATE SERPL-SCNC: 1.7 MMOL/L (ref 0.5–1.9)
MCH RBC QN AUTO: 27.2 PG (ref 27–31)
MCHC RBC AUTO-ENTMCNC: 31.8 G/DL (ref 32–36)
MCV RBC AUTO: 86 FL (ref 82–98)
PLATELET # BLD AUTO: 185 K/UL (ref 150–350)
PMV BLD AUTO: 10.9 FL (ref 9.2–12.9)
POTASSIUM SERPL-SCNC: 4.3 MMOL/L (ref 3.5–5.1)
PROT SERPL-MCNC: 6.9 G/DL (ref 6–8.4)
RBC # BLD AUTO: 3.38 M/UL (ref 4–5.4)
SODIUM SERPL-SCNC: 137 MMOL/L (ref 136–145)
VANCOMYCIN SERPL-MCNC: 22.5 UG/ML
WBC # BLD AUTO: 7.86 K/UL (ref 3.9–12.7)

## 2020-01-04 PROCEDURE — 25000003 PHARM REV CODE 250: Performed by: INTERNAL MEDICINE

## 2020-01-04 PROCEDURE — G0378 HOSPITAL OBSERVATION PER HR: HCPCS

## 2020-01-04 PROCEDURE — 94660 CPAP INITIATION&MGMT: CPT

## 2020-01-04 PROCEDURE — 80053 COMPREHEN METABOLIC PANEL: CPT

## 2020-01-04 PROCEDURE — 36415 COLL VENOUS BLD VENIPUNCTURE: CPT

## 2020-01-04 PROCEDURE — 80202 ASSAY OF VANCOMYCIN: CPT

## 2020-01-04 PROCEDURE — 83605 ASSAY OF LACTIC ACID: CPT

## 2020-01-04 PROCEDURE — 99900035 HC TECH TIME PER 15 MIN (STAT)

## 2020-01-04 PROCEDURE — 85027 COMPLETE CBC AUTOMATED: CPT

## 2020-01-04 RX ORDER — HYDROXYZINE PAMOATE 25 MG/1
25 CAPSULE ORAL EVERY 8 HOURS PRN
Status: DISCONTINUED | OUTPATIENT
Start: 2020-01-04 | End: 2020-01-08 | Stop reason: HOSPADM

## 2020-01-04 RX ORDER — ACETAMINOPHEN 325 MG/1
650 TABLET ORAL EVERY 6 HOURS PRN
Status: DISCONTINUED | OUTPATIENT
Start: 2020-01-04 | End: 2020-01-08 | Stop reason: HOSPADM

## 2020-01-04 RX ADMIN — MAGNESIUM OXIDE 400 MG: 400 TABLET ORAL at 09:01

## 2020-01-04 RX ADMIN — HYDROXYZINE PAMOATE 25 MG: 25 CAPSULE ORAL at 10:01

## 2020-01-04 RX ADMIN — ASPIRIN 81 MG 81 MG: 81 TABLET ORAL at 09:01

## 2020-01-04 RX ADMIN — TRAMADOL HYDROCHLORIDE 50 MG: 50 TABLET, FILM COATED ORAL at 08:01

## 2020-01-04 RX ADMIN — SEVELAMER HYDROCHLORIDE 800 MG: 800 TABLET, FILM COATED PARENTERAL at 09:01

## 2020-01-04 RX ADMIN — SEVELAMER HYDROCHLORIDE 800 MG: 800 TABLET, FILM COATED PARENTERAL at 03:01

## 2020-01-04 RX ADMIN — CALCIUM ACETATE 1334 MG: 667 CAPSULE ORAL at 09:01

## 2020-01-04 RX ADMIN — CALCIUM ACETATE 1334 MG: 667 CAPSULE ORAL at 03:01

## 2020-01-04 RX ADMIN — ACETAMINOPHEN 650 MG: 325 TABLET ORAL at 10:01

## 2020-01-04 NOTE — PLAN OF CARE
01/03/20 2021   PRE-TX-O2   O2 Device (Oxygen Therapy) nasal cannula   Flow (L/min) 3   SpO2 96 %   Pulse Oximetry Type Continuous   Preset CPAP/BiPAP Settings   Mode Of Delivery BiPAP   $ CPAP/BiPAP Daily Charge BiPAP/CPAP Daily   $ Initial CPAP/BiPAP Setup? No   $ Is patient using? No/refused   Reason patient is not wearing? Patient refused   Who was contacted if refused? Ivy Viera RN   Respiratory Evaluation   $ Care Plan Tech Time 15 min   Evaluation For New Orders   Admitting Diagnosis sob   Home Oxygen   Has Home Oxygen? Yes   Liter Flow 3   Duration continuous   Route nasal cannula

## 2020-01-04 NOTE — H&P (VIEW-ONLY)
Formerly [facility]  Cardiology  Consult Note    Patient Name: Griselda Neely  MRN: [id number]  Admission Date: 1/2/2020  Hospital Length of Stay: 0 days  Code Status: Full Code   Attending Provider: Homer Carlson MD   Consulting Provider: Ramila Bloom NP  Primary Care Physician: Kalie Neely MD  Principal Problem:Encephalopathy acute    Patient information was obtained from patient and ER records.     Inpatient consult to Cardiology  Consult performed by: Ramila Bloom NP  Consult ordered by: Homer Carlson MD        Subjective:     REASON FOR CONSULT:  Left atrial thrombus      HPI:  Ms. 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 Patient's daughter states that she was at wound care on 1/2/2020 receiving nitrous oxide treatment and became confused. In the ED patient did have diarrhea. She denied chest pain. On RA her sats were 89%. She denies any shortness of breath to me. BNP is chronically elevated as well as her troponin. She did have afib RVR in the ED which has resolved with Cardizem bolus and she is running atrial fibrillation in the 60s, no ischemic changes noted on ECG.   CT abdomen was done which ruled out colitis but it did show atrial filling defect which prompted an echocardiogram which confirmed left atrial thrombus.       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    both legs    CHF (congestive heart failure)     Encounter for blood transfusion 03/2016    Gout     Hypertension     Mitral valve regurgitation     Osteoarthritis     Pancreatitis     Peripheral vascular disease     Peritoneal dialysis catheter in place     Pneumonia 09/09/2017    Renal failure        Past  Surgical History:   Procedure Laterality Date    ACHILLES TENDON SURGERY Right     APPENDECTOMY      CARDIAC CATHETERIZATION  07/03/2017    CHOLECYSTECTOMY      heal surgery Right     HYSTERECTOMY      PARATHYROIDECTOMY      PARATHYROIDECTOMY  07/13/2017    PERITONEAL CATHETER INSERTION      RENAL BIOPSY      vocal cord nodule         Review of patient's allergies indicates:  No Known Allergies    No current facility-administered medications on file prior to encounter.      Current Outpatient Medications on File Prior to Encounter   Medication Sig    fluticasone furoate-vilanterol (BREO) 100-25 mcg/dose diskus inhaler Inhale 1 puff into the lungs once daily. Controller    ondansetron (ZOFRAN-ODT) 4 MG TbDL Take 1 tablet (4 mg total) by mouth every 6 (six) hours as needed.    traMADol (ULTRAM) 50 mg tablet Take 1 tablet (50 mg total) by mouth 3 (three) times a week. 3 TIMES A WEEK AS NEEDED DURING HEMODIALYSIS    aspirin 81 MG Chew Take 81 mg by mouth once daily.     calcium acetate (PHOSLO) 667 mg capsule Take 1,334 mg by mouth 3 (three) times daily with meals.    diltiaZEM (CARDIZEM CD) 180 MG 24 hr capsule Take 180 mg by mouth once daily.    isosorbide mononitrate (IMDUR) 30 MG 24 hr tablet Take 30 mg by mouth once daily.    levETIRAcetam (KEPPRA) 500 MG Tab Take 500 mg by mouth every Mon, Wed, Fri. After diaylsis    losartan (COZAAR) 25 MG tablet Take 25 mg by mouth once daily.    magnesium oxide (MAG-OX) 400 mg (241.3 mg magnesium) tablet Take 400 mg by mouth once daily.    metoprolol succinate (TOPROL-XL) 50 MG 24 hr tablet Take 1 tablet (50 mg total) by mouth once daily. (Patient taking differently: Take 50 mg by mouth once daily. )    oxyCODONE-acetaminophen (PERCOCET) 5-325 mg per tablet Take 1 tablet by mouth daily as needed for Pain (with dialysis).     sevelamer HCl (RENAGEL) 800 MG Tab Take 1 tablet (800 mg total) by mouth 3 (three) times daily with meals.       Scheduled Meds:    aspirin  81 mg Oral Daily    calcium acetate  1,334 mg Oral TID WM    cholestyramine-aspartame  1 packet Oral BID    diltiaZEM  180 mg Oral Daily    fluticasone furoate-vilanterol  1 puff Inhalation Daily    heparin (porcine)  5,000 Units Subcutaneous Q8H    isosorbide mononitrate  30 mg Oral Daily    losartan  25 mg Oral Daily    magnesium oxide  400 mg Oral Daily    metoprolol succinate  50 mg Oral Daily    sevelamer HCl  800 mg Oral TID WM    sodium thiosulfate  25 g Intravenous Every Mon, Wed, Fri     Continuous Infusions:  PRN Meds:.dextrose 50%, dextrose 50%, glucagon (human recombinant), glucose, glucose, loperamide, ondansetron, sodium chloride 0.9%, traMADol, Pharmacy to dose Vancomycin consult **AND** vancomycin - pharmacy to dose    Family History     Problem Relation (Age of Onset)    Arthritis Mother    Diabetes Mother, Father    Early death Sister, Sister    Heart disease Sister, Maternal Grandfather, Mother    Heart failure Mother    Hypertension Mother          Tobacco Use    Smoking status: Current Some Day Smoker     Packs/day: 0.50     Years: 45.00     Pack years: 22.50    Smokeless tobacco: Never Used   Substance and Sexual Activity    Alcohol use: No    Drug use: No    Sexual activity: Not on file       ROS   No significant headaches or sore throat or runny nose.   No recent changes in vision.   No recent changes in hearing.  No dysphagia or odynophagia.  Reports episode of confusion  Reports shortness of breath at baseline. Denies chest pain  Denies any cough or hemoptysis.   Denies any abdominal pain, nausea, vomiting, or constipation.   Denies any dysuria or polyuria.   Denies any fevers or chills.   Denies any recent significant weight changes.   Denies bleeding diathesis    Objective:     Vital Signs (Most Recent):  Temp: 97.5 °F (36.4 °C) (01/04/20 0742)  Pulse: (!) 59 (01/04/20 0742)  Resp: 19 (01/04/20 0742)  BP: 104/68 (01/04/20 0742)  SpO2: (!) 93 % (01/04/20 0742)  Vital Signs (24h Range):  Temp:  [96 °F (35.6 °C)-97.6 °F (36.4 °C)] 97.5 °F (36.4 °C)  Pulse:  [] 59  Resp:  [18-20] 19  SpO2:  [93 %-99 %] 93 %  BP: (104-174)/(62-93) 104/68     Weight: 67.3 kg (148 lb 5.9 oz)  Body mass index is 24.69 kg/m².    SpO2: (!) 93 %  O2 Device (Oxygen Therapy): nasal cannula      Intake/Output Summary (Last 24 hours) at 1/4/2020 1137  Last data filed at 1/4/2020 0043  Gross per 24 hour   Intake 1000 ml   Output 3400 ml   Net -2400 ml       Lines/Drains/Airways     Drain                 Hemodialysis AV Fistula 08/08/19 0214 Right upper arm 149 days          Peripheral Intravenous Line                 Peripheral IV - Single Lumen 01/03/20 1915 20 G Anterior;Left;Proximal Forearm less than 1 day                Physical Exam  HEENT: Normocephalic, atraumatic, PERRL, Conjunctiva pink, no scleral icterus.   CVS: S1S2+, irregualr, + SM, no rubs or gallops, JVP: Normal.  LUNGS: Clear  ABDOMEN: Soft, NT, BS+  EXTREMITIES: No cyanosis, clubbing, trace edema. Peripheral pulses palpable. Left upper thigh with dressing CDI.   NEURO: AAO X 3. Drowsy.       Significant Labs:   ABG:   Recent Labs   Lab 01/02/20  2157   PH 7.443   PCO2 37.8   HCO3 25.8   POCSATURATED 90*   BE 2   , Blood Culture:   Recent Labs   Lab 01/02/20 2345 01/03/20  0000   LABBLOO No Growth to date  No Growth to date No Growth to date  No Growth to date   , BMP:   Recent Labs   Lab 01/02/20 2200 01/04/20  0415   GLU 72 67*    137   K 4.7 4.3   CL 90* 93*   CO2 23 22*   BUN 46* 33*   CREATININE 6.6* 5.0*   CALCIUM 7.3* 7.6*   MG 1.8  --    , CMP   Recent Labs   Lab 01/02/20  2200 01/04/20  0415    137   K 4.7 4.3   CL 90* 93*   CO2 23 22*   GLU 72 67*   BUN 46* 33*   CREATININE 6.6* 5.0*   CALCIUM 7.3* 7.6*   PROT 7.4 6.9   ALBUMIN 2.9* 2.7*   BILITOT 1.3* 1.0   ALKPHOS 209* 167*   AST 76* 50*   ALT 23 28   ANIONGAP 27* 22*   ESTGFRAFRICA 6.6* 9.2*   EGFRNONAA 5.7* 8.0*   , CBC   Recent Labs   Lab  01/02/20 2200 01/04/20  0415   WBC 17.28* 7.86   HGB 10.1* 9.2*   HCT 31.5* 28.9*    185   , INR   Recent Labs   Lab 01/02/20 2200   INR 1.4   , Lipid Panel No results for input(s): CHOL, HDL, LDLCALC, TRIG, CHOLHDL in the last 48 hours.,   Pathology Results  (Last 10 years)    None      , Troponin   Recent Labs   Lab 01/02/20 2200 01/03/20  0459 01/03/20  1129   TROPONINI 0.143* 0.129* 0.122*   , All pertinent lab results from the last 24 hours have been reviewed. and   Recent Lab Results       01/04/20  0415   01/03/20  1542   01/03/20  1220        Albumin 2.7         Alkaline Phosphatase 167         ALT 28         Anion Gap 22         Ao root annulus   3.25       Ao peak ashtyn   1.30       Ao VTI   15.94       AST 50         AV valve area   1.94       AORTIC VALVE CUSP SEPERATION   1.21       AV mean gradient   5       AV index (prosthetic)   0.56       AV peak gradient   7       AV Velocity Ratio   47.84       BILIRUBIN TOTAL 1.0  Comment:  For infants and newborns, interpretation of results should be based  on gestational age, weight and in agreement with clinical  observations.  Premature Infant recommended reference ranges:  Up to 24 hours.............<8.0 mg/dL  Up to 48 hours............<12.0 mg/dL  3-5 days..................<15.0 mg/dL  6-29 days.................<15.0 mg/dL           BSA   1.64       BUN, Bld 33         Calcium 7.6         Chloride 93         CO2 22         Creatinine 5.0         Left Ventricle Relative Wall Thickness   0.35       E/A ratio   0.24       E/E' ratio   10.22       eGFR if  9.2         eGFR if non  8.0  Comment:  Calculation used to obtain the estimated glomerular filtration  rate (eGFR) is the CKD-EPI equation.            E wave decelartion time   66.30       FS   8       Glucose 67         Hematocrit 28.9         Hemoglobin 9.2         Hep B S Ab     Non Reactive  Comment:  Non Reactive: Inconsistent with immunity,  less than 10  mIU/mL  Reactive:     Consistent with immunity,  greater than 9.9 mIU/mL  Performed at:  [facility]  [id number]  : Eugene Myles MD, Phone:  [phone]       Hepatitis B Surface Ag     Negative  Comment:  Performed at:  [facility] [id number] [place], [place]  [id number]  : Eugene Myles MD, Phone:  [phone]       IVS   0.94       Lactate, Ned 1.7  Comment:  Falsely low lactic acid results can be found in samples   containing >=13.0 mg/dL total bilirubin and/or >=3.5 mg/dL   direct bilirubin.           LA size   5.33       LVOT area   3.4       LV LATERAL E/E' RATIO   9.20       LV SEPTAL E/E' RATIO   11.50       LV Diastolic Volume   89.89       LV Diastolic Volume Index   51.43       LVIDD   5.44       LVIDS   4.99       LV mass   192.99       LV Mass Index   110       LVOT diameter   2.09       LVOT peak kirill   62.19       LVOT stroke volume   30.86       LVOT peak VTI   9.00       LV Systolic Volume   71.72       LV Systolic Volume Index   41.0       MCH 27.2         MCHC 31.8         MCV 86         Mean e'   0.05       MPV 10.9         MV Peak A Kirill   1.89       MV Peak E Kirill   0.46       Platelets 185         Potassium 4.3         PROTEIN TOTAL 6.9         PV PEAK VELOCITY   85.56       PW   0.94       RBC 3.38         RDW 17.7         Sodium 137         TDI SEPTAL   0.04       TDI LATERAL   0.05       Triscuspid Valve Regurgitation Peak Gradient   36       TR Max Kirill   2.99       WBC 7.86               Significant Imaging: X-Ray: CXR: X-Ray Chest 1 View (CXR): No results found for this visit on 01/02/20.  IMPRESSION: CT abdomen    1.  Moderate cardiomegaly with loculated right and very minimal left pleural  effusions along with underlying bibasal atelectasis.  Possibility of changes of  hydrostatic pulmonary edema.  Clinical correlation recommended. Mild amount of  ascites.      2.  4.5 cm filling defect in  the left atrial wall may be mass or thrombus.  Echocardiogram is recommended.    3.  Reflux of contrast within the hepatic veins, possibly due to cardiac  overload.  Clinical correlation recommended.    4.  Scattered colonic diverticulosis without evidence of acute diverticulitis.  Small hiatal hernia.    5.  Diffuse atherosclerotic wall calcifications involving aorta, its branches  and its bifurcation.  CT aortogram would be helpful for evaluation, if  clinically indicated.    IMPRESSION: CT head    No acute intracranial abnormality observed.    There are few hypodensities involving right basal ganglia and left thalamus  probably old lacunar infarcts.    Mild diffuse brain parenchymal atrophic changes.    4.  Mild periventricular chronic microangiopathic ischemic changes.         EXAMINATION:  XR CHEST AP PORTABLE    CLINICAL HISTORY:  Dyspnea, confusion, CHF.    FINDINGS:  Portable chest radiograph at 21:25 hours compared to 12/20/2019 shows the cardiac silhouette is moderately enlarged, stable in size, with the pulmonary vasculature enlarged centrally, stable.  There are aortic vascular calcifications and changes of prior valvuloplasty.    Scattered right lung reticulonodular and ground-glass densities, and small right pleural effusion blunting the costophrenic angle are unchanged.  There is no new pleural or parenchymal abnormality.      Impression       Stable moderate cardiomegaly and central vascular prominence, with scattered right lung interstitial opacities and small right pleural effusion.     TTE 1/3/2020:   · Eccentric left ventricular hypertrophy.  · Severely decreased left ventricular systolic function. The estimated ejection fraction is 25%.  · Grade IV (severe) left ventricular diastolic dysfunction.  · Moderate right ventricular enlargement.  · Moderately reduced right ventricular systolic function.  · Severe left atrial enlargement.  · Layered left atrial thrombus suggested. The thrombus is fixed  and located in the inferior cavity.  · Moderate right atrial enlargement.  · There is a bioprosthetic mitral valve. Prosthetic mitral valve is normal.  · Moderate tricuspid regurgitation.  · Mild to moderate pulmonary hypertension present. Estimated PA systolic pressure 50 mm of Hg.  · Mild to moderate pulmonic regurgitation.  · Small posterior pericardial effusion.    Assessment and Plan:   Left atrial thrombus noted on echo.   Encephalopathy   Congestive heart failure. Chronic. BNP chronically elevated. LVEF ~40-45% on last echo 7/2019. Euvolemic on exam. Presently LV systolic function has decreased to 25%.  COPD. Home o2 at 3 L per NC.    Calciphlyaxis. Receiving nitrous oxide treatments.   Atrial fibrillation. Valvular. Persistent. Patient does not wish to be on any oral anticoagulation or to be cardioverted. Currently rate controlled.   Hypertension.   s/p MVR via right thoracotomy approach on 3/18/19 with Dr. Lora. Functioning appropriately per last echo.  CAD. Nonobstructive on LHC done on 3/13/2019. OM 1 proximal 30% stenosis, mid Lcx 40% stenosis; mid RCA 20% stenosis.   End-stage renal disease on hemodialysis MWF. Anuric. Followed by Dr. Ingram.   Chronic tobacco abuse.   Pulmonary hypertension  Bilateral pleural effusions. R>L.      RECOMMENDATIONS:  1. Discussed with patient the treatment options for her LA thrombus. Advised that she would need a TRUPTI and anticoagulation. Advised patient that she is at high risk for stroke. She does not engage with me at this time and did not give consent for any procedure or to start anticoagulation. In the past, she has refused any anticoagulation for stroke prevention with her atrial fibrillation. Patient and her daughter at  [facility] state that they will think about it and let us know how they would like to proceed.   Thank you for your consult. I will follow-up with patient. Please contact us if you have any additional questions.    Ramila Bloom, NP  Cardiology    Formerly [facility], [facility]

## 2020-01-04 NOTE — CONSULTS
Formerly [facility]  Cardiology  Consult Note    Patient Name: Griselda Neely  MRN: [id number]  Admission Date: 1/2/2020  Hospital Length of Stay: 0 days  Code Status: Full Code   Attending Provider: Homer Carlson MD   Consulting Provider: Ramila Bloom NP  Primary Care Physician: Kalie Neely MD  Principal Problem:Encephalopathy acute    Patient information was obtained from patient and ER records.     Inpatient consult to Cardiology  Consult performed by: Ramila Bloom NP  Consult ordered by: Homer Carlson MD        Subjective:     REASON FOR CONSULT:  Left atrial thrombus      HPI:  Ms. 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 Patient's daughter states that she was at wound care on 1/2/2020 receiving nitrous oxide treatment and became confused. In the ED patient did have diarrhea. She denied chest pain. On RA her sats were 89%. She denies any shortness of breath to me. BNP is chronically elevated as well as her troponin. She did have afib RVR in the ED which has resolved with Cardizem bolus and she is running atrial fibrillation in the 60s, no ischemic changes noted on ECG.   CT abdomen was done which ruled out colitis but it did show atrial filling defect which prompted an echocardiogram which confirmed left atrial thrombus.       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    both legs    CHF (congestive heart failure)     Encounter for blood transfusion 03/2016    Gout     Hypertension     Mitral valve regurgitation     Osteoarthritis     Pancreatitis     Peripheral vascular disease     Peritoneal dialysis catheter in place     Pneumonia 09/09/2017    Renal failure        Past  Surgical History:   Procedure Laterality Date    ACHILLES TENDON SURGERY Right     APPENDECTOMY      CARDIAC CATHETERIZATION  07/03/2017    CHOLECYSTECTOMY      heal surgery Right     HYSTERECTOMY      PARATHYROIDECTOMY      PARATHYROIDECTOMY  07/13/2017    PERITONEAL CATHETER INSERTION      RENAL BIOPSY      vocal cord nodule         Review of patient's allergies indicates:  No Known Allergies    No current facility-administered medications on file prior to encounter.      Current Outpatient Medications on File Prior to Encounter   Medication Sig    fluticasone furoate-vilanterol (BREO) 100-25 mcg/dose diskus inhaler Inhale 1 puff into the lungs once daily. Controller    ondansetron (ZOFRAN-ODT) 4 MG TbDL Take 1 tablet (4 mg total) by mouth every 6 (six) hours as needed.    traMADol (ULTRAM) 50 mg tablet Take 1 tablet (50 mg total) by mouth 3 (three) times a week. 3 TIMES A WEEK AS NEEDED DURING HEMODIALYSIS    aspirin 81 MG Chew Take 81 mg by mouth once daily.     calcium acetate (PHOSLO) 667 mg capsule Take 1,334 mg by mouth 3 (three) times daily with meals.    diltiaZEM (CARDIZEM CD) 180 MG 24 hr capsule Take 180 mg by mouth once daily.    isosorbide mononitrate (IMDUR) 30 MG 24 hr tablet Take 30 mg by mouth once daily.    levETIRAcetam (KEPPRA) 500 MG Tab Take 500 mg by mouth every Mon, Wed, Fri. After diaylsis    losartan (COZAAR) 25 MG tablet Take 25 mg by mouth once daily.    magnesium oxide (MAG-OX) 400 mg (241.3 mg magnesium) tablet Take 400 mg by mouth once daily.    metoprolol succinate (TOPROL-XL) 50 MG 24 hr tablet Take 1 tablet (50 mg total) by mouth once daily. (Patient taking differently: Take 50 mg by mouth once daily. )    oxyCODONE-acetaminophen (PERCOCET) 5-325 mg per tablet Take 1 tablet by mouth daily as needed for Pain (with dialysis).     sevelamer HCl (RENAGEL) 800 MG Tab Take 1 tablet (800 mg total) by mouth 3 (three) times daily with meals.       Scheduled Meds:    aspirin  81 mg Oral Daily    calcium acetate  1,334 mg Oral TID WM    cholestyramine-aspartame  1 packet Oral BID    diltiaZEM  180 mg Oral Daily    fluticasone furoate-vilanterol  1 puff Inhalation Daily    heparin (porcine)  5,000 Units Subcutaneous Q8H    isosorbide mononitrate  30 mg Oral Daily    losartan  25 mg Oral Daily    magnesium oxide  400 mg Oral Daily    metoprolol succinate  50 mg Oral Daily    sevelamer HCl  800 mg Oral TID WM    sodium thiosulfate  25 g Intravenous Every Mon, Wed, Fri     Continuous Infusions:  PRN Meds:.dextrose 50%, dextrose 50%, glucagon (human recombinant), glucose, glucose, loperamide, ondansetron, sodium chloride 0.9%, traMADol, Pharmacy to dose Vancomycin consult **AND** vancomycin - pharmacy to dose    Family History     Problem Relation (Age of Onset)    Arthritis Mother    Diabetes Mother, Father    Early death Sister, Sister    Heart disease Sister, Maternal Grandfather, Mother    Heart failure Mother    Hypertension Mother          Tobacco Use    Smoking status: Current Some Day Smoker     Packs/day: 0.50     Years: 45.00     Pack years: 22.50    Smokeless tobacco: Never Used   Substance and Sexual Activity    Alcohol use: No    Drug use: No    Sexual activity: Not on file       ROS   No significant headaches or sore throat or runny nose.   No recent changes in vision.   No recent changes in hearing.  No dysphagia or odynophagia.  Reports episode of confusion  Reports shortness of breath at baseline. Denies chest pain  Denies any cough or hemoptysis.   Denies any abdominal pain, nausea, vomiting, or constipation.   Denies any dysuria or polyuria.   Denies any fevers or chills.   Denies any recent significant weight changes.   Denies bleeding diathesis    Objective:     Vital Signs (Most Recent):  Temp: 97.5 °F (36.4 °C) (01/04/20 0742)  Pulse: (!) 59 (01/04/20 0742)  Resp: 19 (01/04/20 0742)  BP: 104/68 (01/04/20 0742)  SpO2: (!) 93 % (01/04/20 0742)  Vital Signs (24h Range):  Temp:  [96 °F (35.6 °C)-97.6 °F (36.4 °C)] 97.5 °F (36.4 °C)  Pulse:  [] 59  Resp:  [18-20] 19  SpO2:  [93 %-99 %] 93 %  BP: (104-174)/(62-93) 104/68     Weight: 67.3 kg (148 lb 5.9 oz)  Body mass index is 24.69 kg/m².    SpO2: (!) 93 %  O2 Device (Oxygen Therapy): nasal cannula      Intake/Output Summary (Last 24 hours) at 1/4/2020 1137  Last data filed at 1/4/2020 0043  Gross per 24 hour   Intake 1000 ml   Output 3400 ml   Net -2400 ml       Lines/Drains/Airways     Drain                 Hemodialysis AV Fistula 08/08/19 0214 Right upper arm 149 days          Peripheral Intravenous Line                 Peripheral IV - Single Lumen 01/03/20 1915 20 G Anterior;Left;Proximal Forearm less than 1 day                Physical Exam  HEENT: Normocephalic, atraumatic, PERRL, Conjunctiva pink, no scleral icterus.   CVS: S1S2+, irregualr, + SM, no rubs or gallops, JVP: Normal.  LUNGS: Clear  ABDOMEN: Soft, NT, BS+  EXTREMITIES: No cyanosis, clubbing, trace edema. Peripheral pulses palpable. Left upper thigh with dressing CDI.   NEURO: AAO X 3. Drowsy.       Significant Labs:   ABG:   Recent Labs   Lab 01/02/20  2157   PH 7.443   PCO2 37.8   HCO3 25.8   POCSATURATED 90*   BE 2   , Blood Culture:   Recent Labs   Lab 01/02/20 2345 01/03/20  0000   LABBLOO No Growth to date  No Growth to date No Growth to date  No Growth to date   , BMP:   Recent Labs   Lab 01/02/20 2200 01/04/20  0415   GLU 72 67*    137   K 4.7 4.3   CL 90* 93*   CO2 23 22*   BUN 46* 33*   CREATININE 6.6* 5.0*   CALCIUM 7.3* 7.6*   MG 1.8  --    , CMP   Recent Labs   Lab 01/02/20  2200 01/04/20  0415    137   K 4.7 4.3   CL 90* 93*   CO2 23 22*   GLU 72 67*   BUN 46* 33*   CREATININE 6.6* 5.0*   CALCIUM 7.3* 7.6*   PROT 7.4 6.9   ALBUMIN 2.9* 2.7*   BILITOT 1.3* 1.0   ALKPHOS 209* 167*   AST 76* 50*   ALT 23 28   ANIONGAP 27* 22*   ESTGFRAFRICA 6.6* 9.2*   EGFRNONAA 5.7* 8.0*   , CBC   Recent Labs   Lab  01/02/20 2200 01/04/20  0415   WBC 17.28* 7.86   HGB 10.1* 9.2*   HCT 31.5* 28.9*    185   , INR   Recent Labs   Lab 01/02/20 2200   INR 1.4   , Lipid Panel No results for input(s): CHOL, HDL, LDLCALC, TRIG, CHOLHDL in the last 48 hours.,   Pathology Results  (Last 10 years)    None      , Troponin   Recent Labs   Lab 01/02/20 2200 01/03/20  0459 01/03/20  1129   TROPONINI 0.143* 0.129* 0.122*   , All pertinent lab results from the last 24 hours have been reviewed. and   Recent Lab Results       01/04/20  0415   01/03/20  1542   01/03/20  1220        Albumin 2.7         Alkaline Phosphatase 167         ALT 28         Anion Gap 22         Ao root annulus   3.25       Ao peak ashtyn   1.30       Ao VTI   15.94       AST 50         AV valve area   1.94       AORTIC VALVE CUSP SEPERATION   1.21       AV mean gradient   5       AV index (prosthetic)   0.56       AV peak gradient   7       AV Velocity Ratio   47.84       BILIRUBIN TOTAL 1.0  Comment:  For infants and newborns, interpretation of results should be based  on gestational age, weight and in agreement with clinical  observations.  Premature Infant recommended reference ranges:  Up to 24 hours.............<8.0 mg/dL  Up to 48 hours............<12.0 mg/dL  3-5 days..................<15.0 mg/dL  6-29 days.................<15.0 mg/dL           BSA   1.64       BUN, Bld 33         Calcium 7.6         Chloride 93         CO2 22         Creatinine 5.0         Left Ventricle Relative Wall Thickness   0.35       E/A ratio   0.24       E/E' ratio   10.22       eGFR if  9.2         eGFR if non  8.0  Comment:  Calculation used to obtain the estimated glomerular filtration  rate (eGFR) is the CKD-EPI equation.            E wave decelartion time   66.30       FS   8       Glucose 67         Hematocrit 28.9         Hemoglobin 9.2         Hep B S Ab     Non Reactive  Comment:  Non Reactive: Inconsistent with immunity,  less than 10  mIU/mL  Reactive:     Consistent with immunity,  greater than 9.9 mIU/mL  Performed at:  [facility]  [id number]  : Eugene Myles MD, Phone:  [phone]       Hepatitis B Surface Ag     Negative  Comment:  Performed at:  [facility] [id number] [place], [place]  [id number]  : Eugene Myles MD, Phone:  [phone]       IVS   0.94       Lactate, Ned 1.7  Comment:  Falsely low lactic acid results can be found in samples   containing >=13.0 mg/dL total bilirubin and/or >=3.5 mg/dL   direct bilirubin.           LA size   5.33       LVOT area   3.4       LV LATERAL E/E' RATIO   9.20       LV SEPTAL E/E' RATIO   11.50       LV Diastolic Volume   89.89       LV Diastolic Volume Index   51.43       LVIDD   5.44       LVIDS   4.99       LV mass   192.99       LV Mass Index   110       LVOT diameter   2.09       LVOT peak kirill   62.19       LVOT stroke volume   30.86       LVOT peak VTI   9.00       LV Systolic Volume   71.72       LV Systolic Volume Index   41.0       MCH 27.2         MCHC 31.8         MCV 86         Mean e'   0.05       MPV 10.9         MV Peak A Kirill   1.89       MV Peak E Kirill   0.46       Platelets 185         Potassium 4.3         PROTEIN TOTAL 6.9         PV PEAK VELOCITY   85.56       PW   0.94       RBC 3.38         RDW 17.7         Sodium 137         TDI SEPTAL   0.04       TDI LATERAL   0.05       Triscuspid Valve Regurgitation Peak Gradient   36       TR Max Kirill   2.99       WBC 7.86               Significant Imaging: X-Ray: CXR: X-Ray Chest 1 View (CXR): No results found for this visit on 01/02/20.  IMPRESSION: CT abdomen    1.  Moderate cardiomegaly with loculated right and very minimal left pleural  effusions along with underlying bibasal atelectasis.  Possibility of changes of  hydrostatic pulmonary edema.  Clinical correlation recommended. Mild amount of  ascites.      2.  4.5 cm filling defect in  the left atrial wall may be mass or thrombus.  Echocardiogram is recommended.    3.  Reflux of contrast within the hepatic veins, possibly due to cardiac  overload.  Clinical correlation recommended.    4.  Scattered colonic diverticulosis without evidence of acute diverticulitis.  Small hiatal hernia.    5.  Diffuse atherosclerotic wall calcifications involving aorta, its branches  and its bifurcation.  CT aortogram would be helpful for evaluation, if  clinically indicated.    IMPRESSION: CT head    No acute intracranial abnormality observed.    There are few hypodensities involving right basal ganglia and left thalamus  probably old lacunar infarcts.    Mild diffuse brain parenchymal atrophic changes.    4.  Mild periventricular chronic microangiopathic ischemic changes.         EXAMINATION:  XR CHEST AP PORTABLE    CLINICAL HISTORY:  Dyspnea, confusion, CHF.    FINDINGS:  Portable chest radiograph at 21:25 hours compared to 12/20/2019 shows the cardiac silhouette is moderately enlarged, stable in size, with the pulmonary vasculature enlarged centrally, stable.  There are aortic vascular calcifications and changes of prior valvuloplasty.    Scattered right lung reticulonodular and ground-glass densities, and small right pleural effusion blunting the costophrenic angle are unchanged.  There is no new pleural or parenchymal abnormality.      Impression       Stable moderate cardiomegaly and central vascular prominence, with scattered right lung interstitial opacities and small right pleural effusion.     TTE 1/3/2020:   · Eccentric left ventricular hypertrophy.  · Severely decreased left ventricular systolic function. The estimated ejection fraction is 25%.  · Grade IV (severe) left ventricular diastolic dysfunction.  · Moderate right ventricular enlargement.  · Moderately reduced right ventricular systolic function.  · Severe left atrial enlargement.  · Layered left atrial thrombus suggested. The thrombus is fixed  and located in the inferior cavity.  · Moderate right atrial enlargement.  · There is a bioprosthetic mitral valve. Prosthetic mitral valve is normal.  · Moderate tricuspid regurgitation.  · Mild to moderate pulmonary hypertension present. Estimated PA systolic pressure 50 mm of Hg.  · Mild to moderate pulmonic regurgitation.  · Small posterior pericardial effusion.    Assessment and Plan:   Left atrial thrombus noted on echo.   Encephalopathy   Congestive heart failure. Chronic. BNP chronically elevated. LVEF ~40-45% on last echo 7/2019. Euvolemic on exam. Presently LV systolic function has decreased to 25%.  COPD. Home o2 at 3 L per NC.    Calciphlyaxis. Receiving nitrous oxide treatments.   Atrial fibrillation. Valvular. Persistent. Patient does not wish to be on any oral anticoagulation or to be cardioverted. Currently rate controlled.   Hypertension.   s/p MVR via right thoracotomy approach on 3/18/19 with Dr. Lora. Functioning appropriately per last echo.  CAD. Nonobstructive on LHC done on 3/13/2019. OM 1 proximal 30% stenosis, mid Lcx 40% stenosis; mid RCA 20% stenosis.   End-stage renal disease on hemodialysis MWF. Anuric. Followed by Dr. Ingram.   Chronic tobacco abuse.   Pulmonary hypertension  Bilateral pleural effusions. R>L.      RECOMMENDATIONS:  1. Discussed with patient the treatment options for her LA thrombus. Advised that she would need a TRUPTI and anticoagulation. Advised patient that she is at high risk for stroke. She does not engage with me at this time and did not give consent for any procedure or to start anticoagulation. In the past, she has refused any anticoagulation for stroke prevention with her atrial fibrillation. Patient and her daughter at  [facility] state that they will think about it and let us know how they would like to proceed.   Thank you for your consult. I will follow-up with patient. Please contact us if you have any additional questions.    Ramila Bloom, NP  Cardiology    Former[facility], [facility]

## 2020-01-04 NOTE — PROGRESS NOTES
VANCOMYCIN PHARMACOKINETIC NOTE:  Vancomycin Day # 1    Objective/Assessment:    Diagnosis/Indication for Vancomycin: Skin and soft tissue    74 y.o., female; Actual Body Weight = 67.8 kg (149 lb 7.6 oz).    The patient has the following labs:     1/3/2020 Estimated Creatinine Clearance: 6.8 mL/min (A) (based on SCr of 6.6 mg/dL (H)). Lab Results   Component Value Date    BUN 46 (H) 01/02/2020       Lab Results   Component Value Date    WBC 17.28 (H) 01/02/2020              Plan:  Give first dose of IV vancomycin (1500 mg IV).    Vancomycin dose = 22.1 mg/kg actual body weight    Will follow random vancomycin levels and redose when serum vancomycin level decreases to 10-15 mcg/mL.  Random vancomycin level has been ordered 23 hrs after dose #1    Thank you for allowing us to participate in this patient's care.     Dario Sargent 1/3/2020 6:34 PM  Department of Pharmacy  Ext [phone]

## 2020-01-05 LAB
ANION GAP SERPL CALC-SCNC: 20 MMOL/L (ref 8–16)
BUN SERPL-MCNC: 40 MG/DL (ref 8–23)
CALCIUM SERPL-MCNC: 7.6 MG/DL (ref 8.7–10.5)
CHLORIDE SERPL-SCNC: 91 MMOL/L (ref 95–110)
CO2 SERPL-SCNC: 25 MMOL/L (ref 23–29)
CREAT SERPL-MCNC: 5.5 MG/DL (ref 0.5–1.4)
ERYTHROCYTE [DISTWIDTH] IN BLOOD BY AUTOMATED COUNT: 17.7 % (ref 11.5–14.5)
EST. GFR  (AFRICAN AMERICAN): 8.2 ML/MIN/1.73 M^2
EST. GFR  (NON AFRICAN AMERICAN): 7.1 ML/MIN/1.73 M^2
GLUCOSE SERPL-MCNC: 71 MG/DL (ref 70–110)
HCT VFR BLD AUTO: 28.9 % (ref 37–48.5)
HGB BLD-MCNC: 9.1 G/DL (ref 12–16)
MAGNESIUM SERPL-MCNC: 2 MG/DL (ref 1.6–2.6)
MCH RBC QN AUTO: 27.1 PG (ref 27–31)
MCHC RBC AUTO-ENTMCNC: 31.5 G/DL (ref 32–36)
MCV RBC AUTO: 86 FL (ref 82–98)
PLATELET # BLD AUTO: 191 K/UL (ref 150–350)
PMV BLD AUTO: 11.6 FL (ref 9.2–12.9)
POTASSIUM SERPL-SCNC: 4 MMOL/L (ref 3.5–5.1)
RBC # BLD AUTO: 3.36 M/UL (ref 4–5.4)
SODIUM SERPL-SCNC: 136 MMOL/L (ref 136–145)
WBC # BLD AUTO: 6.5 K/UL (ref 3.9–12.7)

## 2020-01-05 PROCEDURE — 94640 AIRWAY INHALATION TREATMENT: CPT

## 2020-01-05 PROCEDURE — 85027 COMPLETE CBC AUTOMATED: CPT

## 2020-01-05 PROCEDURE — 27000221 HC OXYGEN, UP TO 24 HOURS

## 2020-01-05 PROCEDURE — 94761 N-INVAS EAR/PLS OXIMETRY MLT: CPT

## 2020-01-05 PROCEDURE — 25000003 PHARM REV CODE 250: Performed by: INTERNAL MEDICINE

## 2020-01-05 PROCEDURE — G0378 HOSPITAL OBSERVATION PER HR: HCPCS

## 2020-01-05 PROCEDURE — 83735 ASSAY OF MAGNESIUM: CPT

## 2020-01-05 PROCEDURE — 80048 BASIC METABOLIC PNL TOTAL CA: CPT

## 2020-01-05 PROCEDURE — 90935 HEMODIALYSIS ONE EVALUATION: CPT

## 2020-01-05 PROCEDURE — 99900035 HC TECH TIME PER 15 MIN (STAT)

## 2020-01-05 PROCEDURE — 36415 COLL VENOUS BLD VENIPUNCTURE: CPT

## 2020-01-05 RX ORDER — MUPIROCIN 20 MG/G
OINTMENT TOPICAL 2 TIMES DAILY
Status: DISCONTINUED | OUTPATIENT
Start: 2020-01-05 | End: 2020-01-08 | Stop reason: HOSPADM

## 2020-01-05 RX ORDER — SODIUM CHLORIDE 9 MG/ML
INJECTION, SOLUTION INTRAVENOUS ONCE
Status: CANCELLED | OUTPATIENT
Start: 2020-01-05 | End: 2020-01-05

## 2020-01-05 RX ORDER — WARFARIN 1 MG/1
3 TABLET ORAL DAILY
Status: DISCONTINUED | OUTPATIENT
Start: 2020-01-05 | End: 2020-01-07

## 2020-01-05 RX ORDER — SODIUM CHLORIDE 9 MG/ML
INJECTION, SOLUTION INTRAVENOUS
Status: CANCELLED | OUTPATIENT
Start: 2020-01-05

## 2020-01-05 RX ADMIN — FLUTICASONE FUROATE AND VILANTEROL TRIFENATATE 1 PUFF: 100; 25 POWDER RESPIRATORY (INHALATION) at 07:01

## 2020-01-05 RX ADMIN — METOPROLOL SUCCINATE 50 MG: 50 TABLET, FILM COATED, EXTENDED RELEASE ORAL at 09:01

## 2020-01-05 RX ADMIN — WARFARIN SODIUM 3 MG: 1 TABLET ORAL at 05:01

## 2020-01-05 RX ADMIN — ASPIRIN 81 MG 81 MG: 81 TABLET ORAL at 09:01

## 2020-01-05 RX ADMIN — ISOSORBIDE MONONITRATE 30 MG: 30 TABLET, EXTENDED RELEASE ORAL at 09:01

## 2020-01-05 RX ADMIN — CALCIUM ACETATE 1334 MG: 667 CAPSULE ORAL at 09:01

## 2020-01-05 RX ADMIN — LOSARTAN POTASSIUM 25 MG: 25 TABLET, FILM COATED ORAL at 09:01

## 2020-01-05 RX ADMIN — TRAMADOL HYDROCHLORIDE 50 MG: 50 TABLET, FILM COATED ORAL at 05:01

## 2020-01-05 RX ADMIN — DILTIAZEM HYDROCHLORIDE 180 MG: 180 CAPSULE, COATED, EXTENDED RELEASE ORAL at 09:01

## 2020-01-05 RX ADMIN — SEVELAMER HYDROCHLORIDE 800 MG: 800 TABLET, FILM COATED PARENTERAL at 09:01

## 2020-01-05 RX ADMIN — MAGNESIUM OXIDE 400 MG: 400 TABLET ORAL at 09:01

## 2020-01-05 NOTE — PROGRESS NOTES
"[facility] Medicine  Progress Note    Patient Name: Griselda Neely  MRN: [id number]  Patient Class: OP- Observation   Admission Date: 1/2/2020  Length of Stay: 0 days  Attending Physician: Homer Carlson MD  Primary Care Provider: Kalie Neely MD        Subjective:     Principal Problem:Left atrial thrombus        HPI:  Patient presented to ed with change in mental status after receiving nitrous oxide at her wound debridement appointment  family waited for changes to resolve but it did not  She has a similar presentation 12/20 with encephalopathy secondary to narcotics  In addition she was not wearing her oxygen and is normally on 3L at home, and had osat 89% in ER with cxr finding of right sided effusion  She has been on the "holiday dialysis schedule"   Per patient/family:  Patient states "michell doesn't know what shes talking about" when asked about confusion  And states she feels fine and would like to go home  She does admit to difficulty breathing feeling sob, denied chest pressure  No nausea or vomiting, er staff stated the patient had diarrhea  Patient reports having cough productive of white sputum  No fever or chills    Er course: ct abdomen was done which does not show colitis, it did capture atrial filling defect related to possible known thrombus and loculated effusion    Ros: a ten point ros with pertinent positives and negatives in hpi, otherwise negative    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    both legs    CHF (congestive heart failure)     Encounter for blood transfusion 03/2016    Gout     Hypertension     Mitral valve regurgitation     Osteoarthritis     Pancreatitis     Peripheral vascular disease     Peritoneal dialysis catheter in place     Pneumonia 09/09/2017    Renal failure      Past Surgical History:   Procedure Laterality Date    ACHILLES TENDON SURGERY Right     APPENDECTOMY      CARDIAC " CATHETERIZATION  07/03/2017    CHOLECYSTECTOMY      heal surgery Right     HYSTERECTOMY      PARATHYROIDECTOMY      PARATHYROIDECTOMY  07/13/2017    PERITONEAL CATHETER INSERTION      RENAL BIOPSY      vocal cord nodule       Social History     Socioeconomic History    Marital status:      Spouse name: Not on file    Number of children: Not on file    Years of education: Not on file    Highest education level: Not on file   Occupational History    Not on file   Social Needs    Financial resource strain: Not on file    Food insecurity:     Worry: Not on file     Inability: Not on file    Transportation needs:     Medical: Not on file     Non-medical: Not on file   Tobacco Use    Smoking status: Current Some Day Smoker     Packs/day: 0.50     Years: 45.00     Pack years: 22.50    Smokeless tobacco: Never Used   Substance and Sexual Activity    Alcohol use: No    Drug use: No    Sexual activity: Not on file   Lifestyle    Physical activity:     Days per week: Not on file     Minutes per session: Not on file    Stress: Not on file   Relationships    Social connections:     Talks on phone: Not on file     Gets together: Not on file     Attends Hoahaoism service: Not on file     Active member of club or organization: Not on file     Attends meetings of clubs or organizations: Not on file     Relationship status: Not on file   Other Topics Concern    Not on file   Social History Narrative    Not on file     Family History   Problem Relation Age of Onset    Heart disease Sister     Early death Sister         heart as baby    Heart disease Maternal Grandfather     Diabetes Mother     Hypertension Mother     Heart failure Mother     Heart disease Mother     Arthritis Mother     Diabetes Father     Early death Sister         infant     Vitals:    01/02/20 2334 01/03/20 0002 01/03/20 0011 01/03/20 0030   BP:  133/85  118/79   Pulse: 104 106 102 107   Resp: (!) 25 (!) 29 (!) 24 (!) 24    Temp:       TempSrc:       SpO2: 100% 100% 100% 99%   Weight:       Height:         Gen: aox3 nad  heent ncat  Car: nl s1s2  Pulm: diffuse rales  Abd: soft ntnd  Ext: left thgh wound, tender, has seeped through dressing  Neuro: cn 2-12 grossly intact  Skin: wound left thigh      Overview/Hospital Course:  Patient's mental status improved  She is alert and oriented  Patient had dialysis today  No other issues  Her lactic acid level were very high when she came to the hospital    Interval History: Admitted for AMS following NO2 (nitrous oxide) administration for calciphylaxis. AMS resolved. Pt is alert and oriented x3 during my encounter. Left atrial filling defect on imaging led to TTE which confirmed left atrial thrombus. Pt with no chest pain or SOB.       Objective:     Vital Signs (Most Recent):  Temp: 97.4 °F (36.3 °C) (01/04/20 1706)  Pulse: 74 (01/04/20 1706)  Resp: 19 (01/04/20 1706)  BP: 121/82 (01/04/20 1706)  SpO2: 96 % (01/04/20 1706) Vital Signs (24h Range):  Temp:  [97.4 °F (36.3 °C)-97.6 °F (36.4 °C)] 97.4 °F (36.3 °C)  Pulse:  [] 74  Resp:  [18-20] 19  SpO2:  [93 %-98 %] 96 %  BP: (102-121)/(55-82) 121/82     Weight: 67.3 kg (148 lb 5.9 oz)  Body mass index is 24.69 kg/m².    Intake/Output Summary (Last 24 hours) at 1/4/2020 1812  Last data filed at 1/4/2020 1347  Gross per 24 hour   Intake 1380 ml   Output --   Net 1380 ml      Physical Exam   Constitutional: She is oriented to person, place, and time. No distress.   Frail elderly female    HENT:   Head: Normocephalic and atraumatic.   Eyes: Pupils are equal, round, and reactive to light. EOM are normal.   Neck: Neck supple. No thyromegaly present.   Cardiovascular: Normal rate and regular rhythm.   No murmur heard.  Pulmonary/Chest: Effort normal and breath sounds normal.   Reduced reduced air entry to bases   Abdominal: Soft. Bowel sounds are normal.   Musculoskeletal: She exhibits no edema or deformity.   Neurological: She is alert and  oriented to person, place, and time.   Moving all extremities equally    Skin: Skin is warm.   Bandage intact on left thigh   Psychiatric: She has a normal mood and affect. Her behavior is normal.   Nursing note and vitals reviewed.      Significant Labs:   CBC:   Recent Labs   Lab 01/02/20 2200 01/04/20  0415   WBC 17.28* 7.86   HGB 10.1* 9.2*   HCT 31.5* 28.9*    185     CMP:   Recent Labs   Lab 01/02/20 2200 01/04/20  0415    137   K 4.7 4.3   CL 90* 93*   CO2 23 22*   GLU 72 67*   BUN 46* 33*   CREATININE 6.6* 5.0*   CALCIUM 7.3* 7.6*   PROT 7.4 6.9   ALBUMIN 2.9* 2.7*   BILITOT 1.3* 1.0   ALKPHOS 209* 167*   AST 76* 50*   ALT 23 28   ANIONGAP 27* 22*   EGFRNONAA 5.7* 8.0*     Magnesium:   Recent Labs   Lab 01/02/20 2200   MG 1.8       Significant Imaging: I have reviewed all pertinent imaging results/findings within the past 24 hours.     ECHO results:    · Eccentric left ventricular hypertrophy.  · Severely decreased left ventricular systolic function. The estimated ejection fraction is 25%.  · Grade IV (severe) left ventricular diastolic dysfunction.  · Moderate right ventricular enlargement.  · Moderately reduced right ventricular systolic function.  · Severe left atrial enlargement.  · Layered left atrial thrombus suggested. The thrombus is fixed and located in the inferior cavity.  · Moderate right atrial enlargement.  · There is a bioprosthetic mitral valve. Prosthetic mitral valve is normal.  · Moderate tricuspid regurgitation.  · Mild to moderate pulmonary hypertension present. Estimated PA systolic pressure 50 mm of Hg.  · Mild to moderate pulmonic regurgitation.  · Small posterior pericardial effusion.         X-Ray Chest PA And Lateral [[id number]] Resulted: 01/04/20 1200   Order Status: Completed Updated: 01/04/20 1203   Narrative:     EXAMINATION:  XR CHEST PA AND LATERAL    CLINICAL HISTORY:  loculated effusion;    FINDINGS:  PA and lateral chest is compared to 01/02/2020 shows  cardiomegaly.  There is a prostatic aortic valve.    There is small right pleural effusion with right lower lobe airspace disease.  The right upper lobe and left lung are clear.  No acute osseous abnormality.   Impression:       Cardiomegaly with small right pleural effusion and right lower lobe airspace disease      Electronically signed by: Keily Hernandez MD  Date: 01/04/2020  Time: 12:00       Assessment/Plan:      * Left atrial thrombus  When patient came to ER CT scan was done which revealed a suspected left atrial mass/thrombus  2D echo confirmed left atrial thrombus  Cardiology consulted; pt refused anticoagulation in the past even with hx of pAF         Loculated pleural effusion  Loculated pleural effusion of the right side and very minimal left pleural effusion per CT scan   Received dialysis yesterday   Most likely fluid overload  CXR with small right sided pleural effusion and RLL opacity (unlikely empyema)      PVD (peripheral vascular disease)  Stable chronic issue      Seizures  Resume antiseizure medication      Volume overload  Patient today had hemodialysis      Chronic respiratory failure  Patient normally on 3 L of oxygen at home      Pulmonary hypertension  Pulmonary hypertension per chart review  Echocardiogram 01/03 - mild to moderate pulm HTN with PASP of 50 mm Hg      Troponin I above reference range  Expected in the background of end-stage renal disease/fluid overload  Patient today had dialysis      Wound of left leg  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    Atrial fibrillation  Continue home medications  Patient not on blood thinners  Will need to discuss anticoagulation now with left atrial thrombus - cardiology following     Calciphylaxis  Outpatient wound care         Hyperparathyroidism  Hyperparathyroidism is expected in end-stage renal disease      ESRD (end stage renal disease) on HD M,W, F  Patient today had hemodialysis  Stable issue      Tobacco dependence  Tobacco  cessation counseling provided      HTN (hypertension)  Continue present medication  BP level stable      VTE Risk Mitigation (From admission, onward)         Ordered     heparin (porcine) injection 5,000 Units  Every 8 hours      01/03/20 0318     IP VTE HIGH RISK PATIENT  Once      01/03/20 0318                      Homer Carlson MD  Department of Hospital Medicine   [facility]

## 2020-01-05 NOTE — NURSING
"Ramila NP making rounds on unit. Informed that pt has been refusing scheduled heparin. NP states "I know, she said she doesn't take blood thinners."  No new orders received at this time, safety maintained, call light in reach, will cont to moniter.  "

## 2020-01-05 NOTE — ASSESSMENT & PLAN NOTE
Continue home medications  Patient not on blood thinners  Will need to discuss anticoagulation now with left atrial thrombus - cardiology following

## 2020-01-05 NOTE — ASSESSMENT & PLAN NOTE
When patient came to ER CT scan was done which revealed a suspected left atrial mass/thrombus  2D echo confirmed left atrial thrombus  Cardiology consulted; pt refused anticoagulation in the past even with hx of pAF

## 2020-01-05 NOTE — ASSESSMENT & PLAN NOTE
Pulmonary hypertension per chart review  Echocardiogram 01/03 - mild to moderate pulm HTN with PASP of 50 mm Hg

## 2020-01-05 NOTE — ASSESSMENT & PLAN NOTE
Loculated pleural effusion of the right side and very minimal left pleural effusion per CT scan   Received dialysis yesterday   Most likely fluid overload  CXR with small right sided pleural effusion and RLL opacity (unlikely empyema)

## 2020-01-05 NOTE — PROGRESS NOTES
[facility]  Cardiology  Progress Note    Patient Name: Griselda Neely  MRN: [id number]  Admission Date: 1/2/2020  Hospital Length of Stay: 0 days  Code Status: Full Code   Attending Physician: Homer Carlson MD   Primary Care Physician: Kalie Neely MD  Expected Discharge Date:   Principal Problem:Left atrial thrombus    Subjective:     Hospital Course:     Interval History:   Now willing to take Coumadin    ROS  Objective:     Vital Signs (Most Recent):  Temp: 98.5 °F (36.9 °C) (01/05/20 1100)  Pulse: 67 (01/05/20 1100)  Resp: 18 (01/05/20 1100)  BP: 122/69 (01/05/20 1100)  SpO2: 100 % (01/05/20 1100) Vital Signs (24h Range):  Temp:  [97.4 °F (36.3 °C)-98.5 °F (36.9 °C)] 98.5 °F (36.9 °C)  Pulse:  [] 67  Resp:  [18-20] 18  SpO2:  [95 %-100 %] 100 %  BP: (121-164)/(69-97) 122/69     Weight: 63.3 kg (139 lb 7.1 oz)  Body mass index is 23.2 kg/m².    SpO2: 100 %  O2 Device (Oxygen Therapy): nasal cannula      Intake/Output Summary (Last 24 hours) at 1/5/2020 1239  Last data filed at 1/5/2020 0857  Gross per 24 hour   Intake 720 ml   Output --   Net 720 ml       Lines/Drains/Airways     Drain                 Hemodialysis AV Fistula 08/08/19 0214 Right upper arm 150 days          Peripheral Intravenous Line                 Peripheral IV - Single Lumen 01/03/20 1915 20 G Anterior;Left;Proximal Forearm 1 day                Physical Exam remains in atrial fibrillation with a controlled rate.  Has a systolic murmur. Lungs have slightly clear to auscultation.    Significant Labs:   BMP:   Recent Labs   Lab 01/04/20 0415 01/05/20  0421   GLU 67* 71    136   K 4.3 4.0   CL 93* 91*   CO2 22* 25   BUN 33* 40*   CREATININE 5.0* 5.5*   CALCIUM 7.6* 7.6*   MG  --  2.0    and CBC   Recent Labs   Lab 01/04/20 0415 01/05/20  0421   WBC 7.86 6.50   HGB 9.2* 9.1*   HCT 28.9* 28.9*    191       Significant Imaging: CT scan: CT ABDOMEN PELVIS WITH CONTRAST:   Results for orders placed or  performed during the hospital encounter of 01/02/20   CT Abdomen Pelvis With Contrast    Narrative    Exam: CT OF THE ABDOMEN/PELVIS WITH IV CONTRAST    Clinical data: Diarrhea. Patient will be dialyzed today. History of gallbladder  surgery, appendix, hysterectomy, renal biopsy.    Technique: Direct contiguous axial CT images were acquired through the abdomen  and pelvis with intravenous contrast using soft tissue and bone algorithms. Oral  contrast was not administered. Reformatted/MPR images were performed. Contrast  used: Yes. Amount: 100 mL. Radiation dose: CTDIvol = 7.50 mGy, DLP = 356.60 mGy  x cm.    Limitations: Lack of oral contrast limits evaluation of the bowel loops.    Prior Studies: No prior studies submitted.    Findings: Lung bases: Loculated mild-to-moderate right and minimal left pleural  effusions with underlying bibasilar atelectasis. Moderate cardiomegaly.  4.5 cm  filling defect in the left atrium, mass versus thrombus.    Liver:  Mildly bulky right lobe of liver noted. There is reflux of contrast seen  hepatic veins noted, possibly due to cardiac overload.  No definite evidence of  of mass. No evidence of dilated ducts.    Gallbladder: Not visualized, possibly surgically removed.  Clinical operative  details are not available.    Spleen:  Grossly unremarkable.    Pancreas/adrenal glands:   Grossly unremarkable size, contour and density.    Kidneys:   In anatomic position. Grossly unremarkable renal size, contour and  density. No renal or ureteral calculi. No evidence of a renal mass or cyst.  Mild bilateral renal cortical atrophy.  Perinephric space is unremarkable.    Retroperitoneum: No enlarged retroperitoneal lymphadenopathy.  Atherosclerotic  calcification is seen involving the aorta, its branches and its bifurcation.  IVC appear unremarkable.    Peritoneal cavity:  No evidence of free air.  Mild amount of ascites is noted.    Gastrointestinal tract: No obstruction.  Scattered colonic  diverticulosis  without evidence of acute diverticulitis.  Small hiatal hernia.    Appendix:  Unremarkable    Pelvis:  Solid and hollow viscera grossly unremarkable.  Post-hysterectomy  status noted.  Left inguinal lymph nodes measure up to 1.4 cm short axis.    Osseous structures:  No acute or destructive bony process identified.  Edema of  the body wall.    IMPRESSION:    1.  Moderate cardiomegaly with loculated right and very minimal left pleural  effusions along with underlying bibasal atelectasis.  Possibility of changes of  hydrostatic pulmonary edema.  Clinical correlation recommended. Mild amount of  ascites.      2.  4.5 cm filling defect in the left atrial wall may be mass or thrombus.  Echocardiogram is recommended.    3.  Reflux of contrast within the hepatic veins, possibly due to cardiac  overload.  Clinical correlation recommended.    4.  Scattered colonic diverticulosis without evidence of acute diverticulitis.  Small hiatal hernia.    5.  Diffuse atherosclerotic wall calcifications involving aorta, its branches  and its bifurcation.  CT aortogram would be helpful for evaluation, if  clinically indicated.    Recommendation: Follow up as clinically indicated.    All CT scans at this facility utilize dose modulation, iterative reconstruction,  and/or weight based dosing when appropriate to reduce radiation dose to as low  as reasonably achievable.      Electronically Signed by SOILA NAIDU MD at 1/3/2020 3:17:01 AM     Assessment and Plan:  ESRD on hemodialysis  Permanent atrial fibrillation  Status post mitral valve bioprosthesis  Large left atrial thrombus.  Plan  I did explain to her the benefits which will be decreasing the chance of embolization and complications of increased risk of bleeding also explained.  I will start her on 3 mg of Coumadin tonight and have her get Coumadin teaching from the pharmacy and Dietician.     Brief HPI:     Active Diagnoses:    Diagnosis Date Noted POA     PRINCIPAL PROBLEM:  Left atrial thrombus [I51.3] 01/03/2020 Yes    Loculated pleural effusion [J90] 01/03/2020 Yes    Seizures [R56.9] 12/20/2019 Yes     Chronic    PVD (peripheral vascular disease) [I73.9] 12/20/2019 Yes     Chronic    Volume overload [E87.70] 10/18/2019 Yes    Chronic respiratory failure [J96.10] 05/13/2019 Yes     Chronic    Pulmonary hypertension [I27.20] 07/25/2017 Yes     Chronic    Troponin I above reference range [R79.89] 07/25/2017 Yes    Wound of left leg [S81.802A] 07/20/2017 Yes    Atrial fibrillation [I48.91] 07/18/2017 Yes     Chronic    Hyperparathyroidism [E21.3] 07/18/2017 Yes    Calciphylaxis [E83.59] 07/18/2017 Yes     Chronic    ESRD (end stage renal disease) on HD M,W, F [N18.6] 05/11/2016 Yes     Chronic    Tobacco dependence [F17.200] 09/09/2013 Yes     Chronic    HTN (hypertension) [I10] 08/15/2013 Yes     Chronic      Problems Resolved During this Admission:    Diagnosis Date Noted Date Resolved POA    Encephalopathy acute [G93.40] 01/03/2020 01/04/2020 Yes       VTE Risk Mitigation (From admission, onward)         Ordered     heparin (porcine) injection 5,000 Units  Every 8 hours      01/03/20 0318     IP VTE HIGH RISK PATIENT  Once      01/03/20 0318                Gordon Eason MD  Cardiology  [facility]

## 2020-01-05 NOTE — CARE UPDATE
01/05/20 0739   Patient Assessment/Suction   Level of Consciousness (AVPU) alert   Respiratory Effort Unlabored;Normal   Expansion/Accessory Muscles/Retractions no retractions;no use of accessory muscles   All Lung Fields Breath Sounds coarse   Rhythm/Pattern, Respiratory depth regular;pattern regular;unlabored   Cough Frequency no cough   PRE-TX-O2   O2 Device (Oxygen Therapy) nasal cannula   $ Is the patient on Low Flow Oxygen? Yes   Flow (L/min) 3   SpO2 100 %   Pulse Oximetry Type Continuous   $ Pulse Oximetry - Multiple Charge Pulse Oximetry - Multiple   Pulse 101   Resp 18   Inhaler   $ Inhaler Charges MDI (Metered Dose Inahler) Treatment;Mouth rinsed post treatment   Respiratory Treatment Status (Inhaler) given;mouth rinsed post treatment   Treatment Route (Inhaler) mouthpiece   Patient Position (Inhaler) HOB elevated   Signs of Intolerance (Inhaler) none   Wound Care   $ Wound Care Tech Time 15 min   Area of Concern Bridge of nose   Skin Color/Characteristics without discoloration   Skin Temperature cold   Preset CPAP/BiPAP Settings   Mode Of Delivery Standby

## 2020-01-05 NOTE — SUBJECTIVE & OBJECTIVE
Interval History: Admitted for AMS following NO2 (nitrous oxide) administration for calciphylaxis. AMS resolved. Pt is alert and oriented x3 during my encounter. Left atrial filling defect on imaging led to TTE which confirmed left atrial thrombus. Pt with no chest pain or SOB.       Objective:     Vital Signs (Most Recent):  Temp: 97.4 °F (36.3 °C) (01/04/20 1706)  Pulse: 74 (01/04/20 1706)  Resp: 19 (01/04/20 1706)  BP: 121/82 (01/04/20 1706)  SpO2: 96 % (01/04/20 1706) Vital Signs (24h Range):  Temp:  [97.4 °F (36.3 °C)-97.6 °F (36.4 °C)] 97.4 °F (36.3 °C)  Pulse:  [] 74  Resp:  [18-20] 19  SpO2:  [93 %-98 %] 96 %  BP: (102-121)/(55-82) 121/82     Weight: 67.3 kg (148 lb 5.9 oz)  Body mass index is 24.69 kg/m².    Intake/Output Summary (Last 24 hours) at 1/4/2020 1812  Last data filed at 1/4/2020 1347  Gross per 24 hour   Intake 1380 ml   Output --   Net 1380 ml      Physical Exam   Constitutional: She is oriented to person, place, and time. No distress.   Frail elderly female    HENT:   Head: Normocephalic and atraumatic.   Eyes: Pupils are equal, round, and reactive to light. EOM are normal.   Neck: Neck supple. No thyromegaly present.   Cardiovascular: Normal rate and regular rhythm.   No murmur heard.  Pulmonary/Chest: Effort normal and breath sounds normal.   Reduced reduced air entry to bases   Abdominal: Soft. Bowel sounds are normal.   Musculoskeletal: She exhibits no edema or deformity.   Neurological: She is alert and oriented to person, place, and time.   Moving all extremities equally    Skin: Skin is warm.   Bandage intact on left thigh   Psychiatric: She has a normal mood and affect. Her behavior is normal.   Nursing note and vitals reviewed.      Significant Labs:   CBC:   Recent Labs   Lab 01/02/20  2200 01/04/20  0415   WBC 17.28* 7.86   HGB 10.1* 9.2*   HCT 31.5* 28.9*    185     CMP:   Recent Labs   Lab 01/02/20 2200 01/04/20  0415    137   K 4.7 4.3   CL 90* 93*   CO2 23  22*   GLU 72 67*   BUN 46* 33*   CREATININE 6.6* 5.0*   CALCIUM 7.3* 7.6*   PROT 7.4 6.9   ALBUMIN 2.9* 2.7*   BILITOT 1.3* 1.0   ALKPHOS 209* 167*   AST 76* 50*   ALT 23 28   ANIONGAP 27* 22*   EGFRNONAA 5.7* 8.0*     Magnesium:   Recent Labs   Lab 01/02/20  2200   MG 1.8       Significant Imaging: I have reviewed all pertinent imaging results/findings within the past 24 hours.     ECHO results:    · Eccentric left ventricular hypertrophy.  · Severely decreased left ventricular systolic function. The estimated ejection fraction is 25%.  · Grade IV (severe) left ventricular diastolic dysfunction.  · Moderate right ventricular enlargement.  · Moderately reduced right ventricular systolic function.  · Severe left atrial enlargement.  · Layered left atrial thrombus suggested. The thrombus is fixed and located in the inferior cavity.  · Moderate right atrial enlargement.  · There is a bioprosthetic mitral valve. Prosthetic mitral valve is normal.  · Moderate tricuspid regurgitation.  · Mild to moderate pulmonary hypertension present. Estimated PA systolic pressure 50 mm of Hg.  · Mild to moderate pulmonic regurgitation.  · Small posterior pericardial effusion.

## 2020-01-05 NOTE — PROGRESS NOTES
No acute overnight issues    AA  On BiPAP  + B JVD  RRR, no rub. Stable murmur  Rales B, no overt crackles  S/nt /nd abd      Vital Signs    Report   View Graph    01/03 0700 01/04 0659 01/04 0700 01/04 2143  Most Recent     Temp (°F) 95.9 -97.6 97.4-97.5  97.5 (36.4)  01/04 1939   Pulse   54 -74  73  01/04 1939   Resp 17-98  18-20  20  01/04 1939   //93  102/55 -123/84  123/84  01/04 1939   MAP (mmHg)   71-97  97  01/04 1939   SpO2 (%) 95-99 93 -98  95  01/04 1939   Weight (kg) 67.3         Intake/Output    Report   View Table   P.O.  880mL  1/4 0700 - 1/5 0659  88489/883740/[id number]  3,400  500  500  880  -2,400  +880  1,000  (14.9)  880  (13.1)  3,400  --  1 x  P.O.  Other  IV Piggyback  In  Out  Other  Net  Stool Occurrence  Report   Scheduled     Medication Last Action   aspirin chewable tablet 81 mg Given   81 mg, Daily, Oral    01/04 0920   calcium acetate capsule 1,334 mg Given   1,334 mg, TID WM, Oral    01/04 1539   cholestyramine-aspartame 4 gram packet 4 g Given   4 g, BID, Oral    01/03 1451   diltiaZEM 24 hr capsule 180 mg Given   180 mg, Daily, Oral    01/03 1446   fluticasone furoate-vilanterol 100-25 mcg/dose diskus inhaler 1 puff Given   1 puff, Daily, Inhl    01/03 0811   heparin (porcine) injection 5,000 Units Ordered   5,000 Units, Q8H, SubQ       isosorbide mononitrate 24 hr tablet 30 mg Given   30 mg, Daily, Oral    01/03 1446   losartan tablet 25 mg Given   25 mg, Daily, Oral    01/03 1447   magnesium oxide tablet 400 mg Given   400 mg, Daily, Oral    01/04 0920   metoprolol succinate (TOPROL-XL) 24 hr tablet 50 mg Given   50 mg, Daily, Oral    01/03 1447   sevelamer HCl tablet 800 mg Given   800 mg, TID WM, Oral    01/04 1538   sodium thiosulfate 12.5 gram/50 mL (250 mg/mL) injection 25 g New Bag   25 g, Every Mon, Wed, Fri, IV    01/03 1324      PRN     Medication Last Action   dextrose 50% injection 12.5 g Ordered   12.5 g, PRN, IV       dextrose 50% injection 25 g Ordered    25 g, PRN, IV       glucagon (human recombinant) injection 1 mg Ordered   1 mg, PRN, IM       glucose chewable tablet 16 g Ordered   16 g, PRN, Oral       glucose chewable tablet 24 g Ordered   24 g, PRN, Oral       loperamide capsule 2 mg Ordered   2 mg, QID PRN, Oral       ondansetron disintegrating tablet 4 mg Ordered   4 mg, Q6H PRN, Oral       sodium chloride 0.9% flush 10 mL Ordered   10 mL, PRN, IV       traMADol tablet 50 mg Given   50 mg, Q8H PRN, Oral    01/04 2054        Selected Labs    Report   (Up to last 2 results from the past 72 hours)   Switch View    01/03 0459 01/03 1129 01/04 0415   Sodium --     --     137       Potassium --     --     4.3       Chloride --     --     93Low        CO2 --     --     22Low        BUN, Bld --     --     33High        Creatinine --     --     5.0High        Glucose --     --     67Low        Magnesium --     --     --       WBC --     --     7.86       Hemoglobin --     --     9.2Low        Hematocrit --     --     28.9Low        Platelets --     --     185       Coumadin Monitoring INR --     --     --       Phosphorus --     --     --       Lactate, Ned --     --     1.7       BNP --     --     --       Troponin I 0.129High Panic      0.122High Panic      --        01/02 2200 01/03 0000    Sodium 140     --        Potassium 4.7     --        Chloride 90Low      --        CO2 23     --        BUN, Bld 46High      --        Creatinine 6.6High      --        Glucose 72     --        Magnesium 1.8     --        WBC 17.28High      --        Hemoglobin 10.1Low      --        Hematocrit 31.5Low      --          ASSESSMENT/PLAN:      1.  Altered mental status  Possible sepsis with elevated lactate  Post debridement of calciphylaxis wound?  Mental status clear now  Defer to Hospital Medicine     2.  ESRD  Dialysis now on her usual schedule  No acute issues     3.  Secondary hyperparathyroidism  Give calcium binders despite the calciphylaxis given hypocalcemia and severe  hyperphosphatemia  Monitor calcium and phosphorus     4.  Calciphylaxis  Sodium thiosulfate post dialysis     5.  Hypertension  Acceptable for now  Continue home meds     6.  Anemia  At goal for ESRD  No need for DONNY's  Vital Signs    Report   View Graph    01/03 0700 01/04 0659 01/04 0700 01/04 2143  Most Recent     Temp (°F) 95.9 -97.6 97.4-97.5  97.5 (36.4)  01/04 1939   Pulse   54 -74  73  01/04 1939   Resp 17-98  18-20  20  01/04 1939   //93  102/55 -123/84  123/84  01/04 1939   MAP (mmHg)   71-97  97  01/04 1939   SpO2 (%) 95-99 93 -98  95  01/04 1939   Weight (kg) 67.3         Intake/Output    Report   View Table   P.O.  880mL  1/4 0700 - 1/5 0659  [id number]/[id number]  3,400  500  500  880  -2,400  +880  1,000  (14.9)  880  (13.1)  3,400  --  1 x  P.O.  Other  IV Piggyback  In  Out  Other  Net  Stool Occurrence  Report   Scheduled     Medication Last Action   aspirin chewable tablet 81 mg Given   81 mg, Daily, Oral    01/04 0920   calcium acetate capsule 1,334 mg Given   1,334 mg, TID WM, Oral    01/04 1539   cholestyramine-aspartame 4 gram packet 4 g Given   4 g, BID, Oral    01/03 1451   diltiaZEM 24 hr capsule 180 mg Given   180 mg, Daily, Oral    01/03 1446   fluticasone furoate-vilanterol 100-25 mcg/dose diskus inhaler 1 puff Given   1 puff, Daily, Inhl    01/03 0811   heparin (porcine) injection 5,000 Units Ordered   5,000 Units, Q8H, SubQ       isosorbide mononitrate 24 hr tablet 30 mg Given   30 mg, Daily, Oral    01/03 1446   losartan tablet 25 mg Given   25 mg, Daily, Oral    01/03 1447   magnesium oxide tablet 400 mg Given   400 mg, Daily, Oral    01/04 0920   metoprolol succinate (TOPROL-XL) 24 hr tablet 50 mg Given   50 mg, Daily, Oral    01/03 1447   sevelamer HCl tablet 800 mg Given   800 mg, TID WM, Oral    01/04 1538   sodium thiosulfate 12.5 gram/50 mL (250 mg/mL) injection 25 g New Bag   25 g, Every Mon, Wed, Fri, IV    01/03 1324      PRN     Medication Last Action    dextrose 50% injection 12.5 g Ordered   12.5 g, PRN, IV       dextrose 50% injection 25 g Ordered   25 g, PRN, IV       glucagon (human recombinant) injection 1 mg Ordered   1 mg, PRN, IM       glucose chewable tablet 16 g Ordered   16 g, PRN, Oral       glucose chewable tablet 24 g Ordered   24 g, PRN, Oral       loperamide capsule 2 mg Ordered   2 mg, QID PRN, Oral       ondansetron disintegrating tablet 4 mg Ordered   4 mg, Q6H PRN, Oral       sodium chloride 0.9% flush 10 mL Ordered   10 mL, PRN, IV       traMADol tablet 50 mg Given   50 mg, Q8H PRN, Oral    01/04 2054        Selected Labs    Report   (Up to last 2 results from the past 72 hours)   Switch View    01/03 0459 01/03 1129 01/04 0415   Sodium --     --     137       Potassium --     --     4.3       Chloride --     --     93Low        CO2 --     --     22Low        BUN, Bld --     --     33High        Creatinine --     --     5.0High        Glucose --     --     67Low        Magnesium --     --     --       WBC --     --     7.86       Hemoglobin --     --     9.2Low        Hematocrit --     --     28.9Low        Platelets --     --     185       Coumadin Monitoring INR --     --     --       Phosphorus --     --     --       Lactate, Ned --     --     1.7       BNP --     --     --       Troponin I 0.129High Panic      0.122High Panic      --        01/02 2200 01/03 0000    Sodium 140     --        Potassium 4.7     --        Chloride 90Low      --        CO2 23     --        BUN, Bld 46High      --        Creatinine 6.6High      --        Glucose 72     --        Magnesium 1.8     --        WBC 17.28High      --        Hemoglobin 10.1Low      --        Hematocrit 31.5Low      --              ASSESSMENT/PLAN:      1.  Altered mental status  Improved     2.  ESRD  Back on BiPAP this PM; will likely need HD again in AM.   F/u AM     3.  Secondary hyperparathyroidism  Give calcium binders despite the calciphylaxis given hypocalcemia and severe  hyperphosphatemia  Monitor calcium and phosphorus     4.  Calciphylaxis  Sodium thiosulfate post dialysis     5.  Hypertension  Acceptable for now  Continue home meds     6.  Anemia  Declining Hgb  Restart DONNY

## 2020-01-06 LAB
ANION GAP SERPL CALC-SCNC: 21 MMOL/L (ref 8–16)
BUN SERPL-MCNC: 44 MG/DL (ref 8–23)
CALCIUM SERPL-MCNC: 7.6 MG/DL (ref 8.7–10.5)
CHLORIDE SERPL-SCNC: 92 MMOL/L (ref 95–110)
CO2 SERPL-SCNC: 23 MMOL/L (ref 23–29)
CREAT SERPL-MCNC: 6.4 MG/DL (ref 0.5–1.4)
ERYTHROCYTE [DISTWIDTH] IN BLOOD BY AUTOMATED COUNT: 18 % (ref 11.5–14.5)
EST. GFR  (AFRICAN AMERICAN): 6.8 ML/MIN/1.73 M^2
EST. GFR  (NON AFRICAN AMERICAN): 5.9 ML/MIN/1.73 M^2
GLUCOSE SERPL-MCNC: 73 MG/DL (ref 70–110)
HCT VFR BLD AUTO: 30.7 % (ref 37–48.5)
HGB BLD-MCNC: 9.8 G/DL (ref 12–16)
MAGNESIUM SERPL-MCNC: 2.2 MG/DL (ref 1.6–2.6)
MCH RBC QN AUTO: 27.9 PG (ref 27–31)
MCHC RBC AUTO-ENTMCNC: 31.9 G/DL (ref 32–36)
MCV RBC AUTO: 88 FL (ref 82–98)
PLATELET # BLD AUTO: 216 K/UL (ref 150–350)
PMV BLD AUTO: 10.8 FL (ref 9.2–12.9)
POTASSIUM SERPL-SCNC: 4.5 MMOL/L (ref 3.5–5.1)
RBC # BLD AUTO: 3.51 M/UL (ref 4–5.4)
SODIUM SERPL-SCNC: 136 MMOL/L (ref 136–145)
STOOL CULTURE: NORMAL
STOOL CULTURE: NORMAL
WBC # BLD AUTO: 6.6 K/UL (ref 3.9–12.7)

## 2020-01-06 PROCEDURE — 27000221 HC OXYGEN, UP TO 24 HOURS

## 2020-01-06 PROCEDURE — 94640 AIRWAY INHALATION TREATMENT: CPT

## 2020-01-06 PROCEDURE — 21400001 HC TELEMETRY ROOM

## 2020-01-06 PROCEDURE — 90935 HEMODIALYSIS ONE EVALUATION: CPT

## 2020-01-06 PROCEDURE — 83735 ASSAY OF MAGNESIUM: CPT

## 2020-01-06 PROCEDURE — 99900035 HC TECH TIME PER 15 MIN (STAT)

## 2020-01-06 PROCEDURE — 80048 BASIC METABOLIC PNL TOTAL CA: CPT

## 2020-01-06 PROCEDURE — 25000003 PHARM REV CODE 250: Performed by: INTERNAL MEDICINE

## 2020-01-06 PROCEDURE — 94660 CPAP INITIATION&MGMT: CPT

## 2020-01-06 PROCEDURE — 94761 N-INVAS EAR/PLS OXIMETRY MLT: CPT

## 2020-01-06 PROCEDURE — 85027 COMPLETE CBC AUTOMATED: CPT

## 2020-01-06 PROCEDURE — 36415 COLL VENOUS BLD VENIPUNCTURE: CPT

## 2020-01-06 RX ORDER — SODIUM CHLORIDE 0.9 % (FLUSH) 0.9 %
10 SYRINGE (ML) INJECTION
Status: DISCONTINUED | OUTPATIENT
Start: 2020-01-06 | End: 2020-01-08 | Stop reason: HOSPADM

## 2020-01-06 RX ADMIN — SEVELAMER HYDROCHLORIDE 800 MG: 800 TABLET, FILM COATED PARENTERAL at 06:01

## 2020-01-06 RX ADMIN — FLUTICASONE FUROATE AND VILANTEROL TRIFENATATE 1 PUFF: 100; 25 POWDER RESPIRATORY (INHALATION) at 08:01

## 2020-01-06 RX ADMIN — CALCIUM ACETATE 1334 MG: 667 CAPSULE ORAL at 06:01

## 2020-01-06 NOTE — PROGRESS NOTES
[facility]  Cardiology  Progress Note    Patient Name: Griselda Neely  MRN: [id number]  Admission Date: 1/2/2020  Hospital Length of Stay: 0 days  Code Status: Full Code   Attending Physician: Homer Carlson MD   Primary Care Physician: Kalie Neely MD  Expected Discharge Date:   Principal Problem:Left atrial thrombus    Subjective:       Interval History: Denies any chest pain or shortness of breath.     ROS   Denies any focal weakness.   Denies any abdominal pain.   Denies any bleeding issues.     Objective:     Vital Signs (Most Recent):  Temp: 98 °F (36.7 °C) (01/06/20 1210)  Pulse: (!) 47 (01/06/20 1300)  Resp: 18 (01/06/20 1210)  BP: 122/73 (01/06/20 1300)  SpO2: 98 % (01/06/20 0855) Vital Signs (24h Range):  Temp:  [97 °F (36.1 °C)-98 °F (36.7 °C)] 98 °F (36.7 °C)  Pulse:  [47-97] 47  Resp:  [16-18] 18  SpO2:  [96 %-100 %] 98 %  BP: (110-148)/(57-86) 122/73     Weight: 63.3 kg (139 lb 7.1 oz)  Body mass index is 23.2 kg/m².    SpO2: 98 %  O2 Device (Oxygen Therapy): nasal cannula      Intake/Output Summary (Last 24 hours) at 1/6/2020 1330  Last data filed at 1/5/2020 1915  Gross per 24 hour   Intake 500 ml   Output 2500 ml   Net -2000 ml       Lines/Drains/Airways     Drain                 Hemodialysis AV Fistula 08/08/19 0214 Right upper arm 151 days          Peripheral Intravenous Line                 Peripheral IV - Single Lumen 01/03/20 1915 20 G Anterior;Left;Proximal Forearm 2 days                Scheduled Meds:   aspirin  81 mg Oral Daily    calcium acetate  1,334 mg Oral TID WM    cholestyramine-aspartame  1 packet Oral BID    diltiaZEM  180 mg Oral Daily    fluticasone furoate-vilanterol  1 puff Inhalation Daily    heparin (porcine)  5,000 Units Subcutaneous Q8H    isosorbide mononitrate  30 mg Oral Daily    losartan  25 mg Oral Daily    magnesium oxide  400 mg Oral Daily    metoprolol succinate  50 mg Oral Daily    mupirocin   Nasal BID    sevelamer HCl  800 mg  Oral TID WM    sodium thiosulfate  25 g Intravenous Every Mon, Wed, Fri    warfarin  3 mg Oral Daily     Continuous Infusions:  PRN Meds:.acetaminophen, dextrose 50%, dextrose 50%, glucagon (human recombinant), glucose, glucose, hydrOXYzine pamoate, loperamide, ondansetron, sodium chloride 0.9%, traMADol, Pharmacy to dose Vancomycin consult **AND** vancomycin - pharmacy to dose     Physical Exam  HEENT: Normocephalic, atraumatic, PERRL, Conjunctiva pink, no scleral icterus.   CVS: S1S2+, Irregular, SM+, no rubs or gallops, JVP: Normal.  LUNGS: No wheezes or crackles.   ABDOMEN: Soft, NT, BS+  EXTREMITIES: No cyanosis, clubbing or edema  NEURO: AAO X 3.       Significant Labs:   Blood Culture: No results for input(s): LABBLOO in the last 48 hours., BMP:   Recent Labs   Lab 01/05/20 0421 01/06/20 0521   GLU 71 73    136   K 4.0 4.5   CL 91* 92*   CO2 25 23   BUN 40* 44*   CREATININE 5.5* 6.4*   CALCIUM 7.6* 7.6*   MG 2.0 2.2   , CMP   Recent Labs   Lab 01/05/20 0421 01/06/20 0521    136   K 4.0 4.5   CL 91* 92*   CO2 25 23   GLU 71 73   BUN 40* 44*   CREATININE 5.5* 6.4*   CALCIUM 7.6* 7.6*   ANIONGAP 20* 21*   ESTGFRAFRICA 8.2* 6.8*   EGFRNONAA 7.1* 5.9*   , CBC   Recent Labs   Lab 01/05/20  0421 01/06/20 0521   WBC 6.50 6.60   HGB 9.1* 9.8*   HCT 28.9* 30.7*    216   , INR No results for input(s): INR, PROTIME in the last 48 hours., Lipid Panel No results for input(s): CHOL, HDL, LDLCALC, TRIG, CHOLHDL in the last 48 hours. and Troponin No results for input(s): TROPONINI in the last 48 hours.    Significant Imaging: Reviewed.   Assessment and Plan:     IMPRESSION:  1. Possible left atrial mass. CT Abdomen with left atrial filling defect. Patient agreeable to coumadin now. Discussed TRUPTI.   2. Congestive heart failure. Chronic. LVEF ~25% on last echo. Volume overload on exam.   3. COPD.  4. Calciphlyaxis. Reportedly better.   5. Atrial fibrillation. Valvular. Persistent.   6. Hypertension.  Better controlled at present. Elevates with agitation. Periods of hypotension. Seems better.   7. s/p MVR via right thoracotomy approach on 3/18/19 with Dr. Lora. Functioning appropriately per last echo.   8. CAD. Nonobstructive on LHC done on 3/13/2019.   9. End-stage renal disease on hemodialysis MWF. Anuric. Followed by Dr. Ingram.   10. Chronic tobacco abuse.      PLAN:  1. Continue current management.   2. Check CTA chest.   3. Discussed TRUPTI with the patient. She is agreeable to proceeding. Will proceed with TRUPTI in AM with MAC with Anesthesia help. Indications, risks, benefits as well as alternatives were discussed with the patient and informed consent would be obtained.     Katharina Powell MD  Cardiology  [facility]

## 2020-01-06 NOTE — PROGRESS NOTES
Critical access hospital Medicine  Progress Note    Patient Name: Griselda Neely  MRN: [id number]  Patient Class: OP- Observation   Admission Date: 1/2/2020  Length of Stay: 0 days  Attending Physician: Homer Carlson MD  Primary Care Provider: Kalie Neely MD        Subjective:       Interval History: No acute overnight events reported. Denies chest pain or SOB. She is alert and oriented x 3. Agreeable to starting anticoagulation for LA thrombus.      Objective:     Vital Signs (Most Recent):  Temp: 97 °F (36.1 °C) (01/05/20 1915)  Pulse: 80 (01/05/20 1915)  Resp: 18 (01/05/20 1915)  BP: (!) 138/59 (01/05/20 1915)  SpO2: 97 % (01/05/20 1500) Vital Signs (24h Range):  Temp:  [97 °F (36.1 °C)-98.5 °F (36.9 °C)] 97 °F (36.1 °C)  Pulse:  [] 80  Resp:  [18-20] 18  SpO2:  [97 %-100 %] 97 %  BP: (110-164)/(57-97) 138/59     Weight: 63.3 kg (139 lb 7.1 oz)  Body mass index is 23.2 kg/m².    Intake/Output Summary (Last 24 hours) at 1/5/2020 2128  Last data filed at 1/5/2020 1915  Gross per 24 hour   Intake 980 ml   Output 2500 ml   Net -1520 ml      Physical Exam   Constitutional: She is oriented to person, place, and time. No distress.   Frail elderly female    HENT:   Head: Normocephalic and atraumatic.   Eyes: Pupils are equal, round, and reactive to light. EOM are normal.   Neck: Neck supple. No thyromegaly present.   Cardiovascular: Normal rate and regular rhythm.   No murmur heard.  Pulmonary/Chest: Effort normal and breath sounds normal.   Reduced reduced air entry to bases   Abdominal: Soft. Bowel sounds are normal.   Musculoskeletal: She exhibits no edema or deformity.   Neurological: She is alert and oriented to person, place, and time.   Moving all extremities equally    Skin: Skin is warm.   Bandage intact on left thigh   Psychiatric: She has a normal mood and affect. Her behavior is normal.   Nursing note and vitals reviewed.      Significant Labs:   CBC:   Recent Labs   Lab  01/04/20 0415 01/05/20 0421   WBC 7.86 6.50   HGB 9.2* 9.1*   HCT 28.9* 28.9*    191     CMP:   Recent Labs   Lab 01/04/20 0415 01/05/20 0421    136   K 4.3 4.0   CL 93* 91*   CO2 22* 25   GLU 67* 71   BUN 33* 40*   CREATININE 5.0* 5.5*   CALCIUM 7.6* 7.6*   PROT 6.9  --    ALBUMIN 2.7*  --    BILITOT 1.0  --    ALKPHOS 167*  --    AST 50*  --    ALT 28  --    ANIONGAP 22* 20*   EGFRNONAA 8.0* 7.1*           Assessment/Plan:      * Left atrial thrombus  When patient came to ER CT scan was done which revealed a suspected left atrial mass/thrombus  2D echo confirmed left atrial thrombus  Cardiology consulted; pt refused anticoagulation in the past even with hx of pAF   Now agreed to it; staretd warfarin 2.5 mg qhs - warfarin teaching by pharmacy         Loculated pleural effusion  Loculated pleural effusion of the right side and very minimal left pleural effusion per CT scan   Dialysis as per nephrology   Most likely fluid overload  CXR with small right sided pleural effusion and RLL opacity (unlikely empyema)      PVD (peripheral vascular disease)  Stable chronic issue      Seizures  Resume antiseizure medication      Volume overload  Patient today had hemodialysis      Chronic respiratory failure  Patient normally on 3 L of oxygen at home      Pulmonary hypertension  Pulmonary hypertension per chart review  Echocardiogram 01/03 - mild to moderate pulm HTN with PASP of 50 mm Hg      Troponin I above reference range  Expected in the background of end-stage renal disease/fluid overload  Patient today had dialysis      Wound of left leg  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    Atrial fibrillation  Continue home medications  Patient not on blood thinners earlier; now with LA thrombus   Started on warfarin 2.5 mg qhs; appreciate cardiology input     Calciphylaxis  Outpatient wound care         Hyperparathyroidism  Hyperparathyroidism is expected in end-stage renal disease      ESRD (end stage  renal disease) on HD M,W, F  Patient today had hemodialysis  Stable issue      Tobacco dependence  Tobacco cessation counseling provided      HTN (hypertension)  Continue present medication  BP level stable      VTE Risk Mitigation (From admission, onward)         Ordered     warfarin (COUMADIN) tablet 3 mg  Daily      01/05/20 1244     heparin (porcine) injection 5,000 Units  Every 8 hours      01/03/20 0318     IP VTE HIGH RISK PATIENT  Once      01/03/20 0318                      Homer Carlson MD  Department of Hospital Medicine   Formerly [facility]

## 2020-01-06 NOTE — ASSESSMENT & PLAN NOTE
Loculated pleural effusion of the right side and very minimal left pleural effusion per CT scan   Dialysis as per nephrology   Most likely fluid overload  CXR on admission with small right sided pleural effusion and RLL opacity (unlikely empyema)

## 2020-01-06 NOTE — ASSESSMENT & PLAN NOTE
When patient came to ER CT scan was done which revealed a suspected left atrial mass/thrombus  2D echo confirmed left atrial thrombus  Other possible diagnosis - ?atrial mass (leiomyocarcoma)   Cardiology following   Now agreed to anticogualtion; staretd warfarin 3 mg qhs - warfarin teaching by pharmacy   Plan for TRUPTI for further evaluation of left atrial mass vs thrombus   Will also need CTA chest

## 2020-01-06 NOTE — NURSING
Informed by dialysis nurse, Rowena, that she was consulting Dr. Loyola for prolonged bleeding to dialysis access site; pt still in dialysis at this time; relayed this to Dr. Carlson; MD states to hold today's dose of coumadin.

## 2020-01-06 NOTE — PLAN OF CARE
01/06/20 0855   Patient Assessment/Suction   Level of Consciousness (AVPU) alert   Respiratory Effort Normal;Unlabored   Expansion/Accessory Muscles/Retractions no use of accessory muscles   All Lung Fields Breath Sounds clear;diminished   PRE-TX-O2   O2 Device (Oxygen Therapy) nasal cannula   $ Is the patient on Low Flow Oxygen? Yes   Flow (L/min) 3   SpO2 98 %   Pulse Oximetry Type Continuous   $ Pulse Oximetry - Multiple Charge Pulse Oximetry - Multiple   Pulse 81   Resp 18   Aerosol Therapy   $ Aerosol Therapy Charges PRN treatment not required   Respiratory Treatment Status (SVN) PRN treatment not required   Inhaler   $ Inhaler Charges MDI (Metered Dose Inahler) Treatment   Daily Review of Necessity (Inhaler) completed   Respiratory Treatment Status (Inhaler) given;mouth rinsed post treatment   Treatment Route (Inhaler) mouthpiece   Patient Position (Inhaler) HOB elevated   Post Treatment Assessment (Inhaler) breath sounds unchanged   Signs of Intolerance (Inhaler) none   Preset CPAP/BiPAP Settings   Mode Of Delivery Standby   $ CPAP/BiPAP Daily Charge BiPAP/CPAP Daily   $ Initial CPAP/BiPAP Setup? No   Equipment Type V60

## 2020-01-06 NOTE — NURSING
Pt refused all medications at this time. Education on each medication provided. Pt still refuses. Assisted pt with personal hygiene. New brief applied. Repositioned in bed for comfort. No other needs. Fall precautions reviewed. Pt verbalized understanding. Call light in reach. Bed alarm in use.

## 2020-01-06 NOTE — PLAN OF CARE
Plan of care reviewed with the pt. Cardiac, vital signs, and lab monitoring. Increase activity as tolerated. Bed alarm on and safety precautions maintained. No c/o pain. 2L taken off at dialysis on 1/5.  Breathing treatments and adjust oxygen as needed. Strict I&O. Daily weights.

## 2020-01-06 NOTE — ASSESSMENT & PLAN NOTE
Continue home medications  Patient not on blood thinners earlier; now with LA thrombus   Started on warfarin 2.5 mg qhs; appreciate cardiology input

## 2020-01-06 NOTE — SUBJECTIVE & OBJECTIVE
Interval History: No acute overnight events reported. Denies chest pain or SOB. She is alert and oriented x 3. Agreeable to starting anticoagulation for LA thrombus.      Objective:     Vital Signs (Most Recent):  Temp: 97 °F (36.1 °C) (01/05/20 1915)  Pulse: 80 (01/05/20 1915)  Resp: 18 (01/05/20 1915)  BP: (!) 138/59 (01/05/20 1915)  SpO2: 97 % (01/05/20 1500) Vital Signs (24h Range):  Temp:  [97 °F (36.1 °C)-98.5 °F (36.9 °C)] 97 °F (36.1 °C)  Pulse:  [] 80  Resp:  [18-20] 18  SpO2:  [97 %-100 %] 97 %  BP: (110-164)/(57-97) 138/59     Weight: 63.3 kg (139 lb 7.1 oz)  Body mass index is 23.2 kg/m².    Intake/Output Summary (Last 24 hours) at 1/5/2020 2128  Last data filed at 1/5/2020 1915  Gross per 24 hour   Intake 980 ml   Output 2500 ml   Net -1520 ml      Physical Exam   Constitutional: She is oriented to person, place, and time. No distress.   Frail elderly female    HENT:   Head: Normocephalic and atraumatic.   Eyes: Pupils are equal, round, and reactive to light. EOM are normal.   Neck: Neck supple. No thyromegaly present.   Cardiovascular: Normal rate and regular rhythm.   No murmur heard.  Pulmonary/Chest: Effort normal and breath sounds normal.   Reduced reduced air entry to bases   Abdominal: Soft. Bowel sounds are normal.   Musculoskeletal: She exhibits no edema or deformity.   Neurological: She is alert and oriented to person, place, and time.   Moving all extremities equally    Skin: Skin is warm.   Bandage intact on left thigh   Psychiatric: She has a normal mood and affect. Her behavior is normal.   Nursing note and vitals reviewed.      Significant Labs:   CBC:   Recent Labs   Lab 01/04/20 0415 01/05/20  0421   WBC 7.86 6.50   HGB 9.2* 9.1*   HCT 28.9* 28.9*    191     CMP:   Recent Labs   Lab 01/04/20 0415 01/05/20  0421    136   K 4.3 4.0   CL 93* 91*   CO2 22* 25   GLU 67* 71   BUN 33* 40*   CREATININE 5.0* 5.5*   CALCIUM 7.6* 7.6*   PROT 6.9  --    ALBUMIN 2.7*  --     BILITOT 1.0  --    ALKPHOS 167*  --    AST 50*  --    ALT 28  --    ANIONGAP 22* 20*   EGFRNONAA 8.0* 7.1*

## 2020-01-06 NOTE — PLAN OF CARE
01/05/20 2200   Patient Assessment/Suction   Level of Consciousness (AVPU) alert   Respiratory Effort Unlabored   Expansion/Accessory Muscles/Retractions no use of accessory muscles   PRE-TX-O2   O2 Device (Oxygen Therapy) nasal cannula   $ Is the patient on Low Flow Oxygen? Yes   Pulse 80   Resp 18   Aerosol Therapy   $ Aerosol Therapy Charges PRN treatment not required   Respiratory Treatment Status (SVN) PRN treatment not required

## 2020-01-06 NOTE — PROGRESS NOTES
[facility] Medicine  Progress Note    Patient Name: Griselda Neely  MRN: [id number]  Patient Class: IP- Inpatient   Admission Date: 1/2/2020  Length of Stay: 0 days  Attending Physician: Homer Carlson MD  Primary Care Provider: Kalie Neely MD        Subjective:     Principal Problem:Left atrial thrombus        Interval History:  No acute overnight events reported.  Patient had dialysis last night as per Nephrology.  Patient denies any shortness of breath, chest pain, lightheadedness/dizziness, focal weakness, sensory deficit or seizure-like activity.      Objective:     Vital Signs (Most Recent):  Temp: 98 °F (36.7 °C) (01/06/20 1210)  Pulse: (!) 56 (01/06/20 1400)  Resp: 18 (01/06/20 1210)  BP: 132/65 (01/06/20 1400)  SpO2: 98 % (01/06/20 0855) Vital Signs (24h Range):  Temp:  [97 °F (36.1 °C)-98 °F (36.7 °C)] 98 °F (36.7 °C)  Pulse:  [47-97] 56  Resp:  [16-18] 18  SpO2:  [96 %-100 %] 98 %  BP: (110-148)/(57-86) 132/65     Weight: 63.3 kg (139 lb 7.1 oz)  Body mass index is 23.2 kg/m².    Intake/Output Summary (Last 24 hours) at 1/6/2020 1445  Last data filed at 1/5/2020 1915  Gross per 24 hour   Intake 500 ml   Output 2500 ml   Net -2000 ml      Physical Exam   Constitutional: She is oriented to person, place, and time. No distress.   Frail elderly female    HENT:   Head: Normocephalic and atraumatic.   Eyes: Pupils are equal, round, and reactive to light. EOM are normal.   Neck: Neck supple. No thyromegaly present.   Cardiovascular: Normal rate and regular rhythm.   No murmur heard.  Pulmonary/Chest: Effort normal and breath sounds normal.   Reduced reduced air entry to bases   Abdominal: Soft. Bowel sounds are normal.   Musculoskeletal: She exhibits no edema or deformity.   Neurological: She is alert and oriented to person, place, and time.   Moving all extremities equally    Skin: Skin is warm.   Bandage intact on left thigh   Psychiatric: She has a normal mood and affect.  Her behavior is normal.   Nursing note and vitals reviewed.      Significant Labs:   CBC:   Recent Labs   Lab 01/05/20 0421 01/06/20 0521   WBC 6.50 6.60   HGB 9.1* 9.8*   HCT 28.9* 30.7*    216     CMP:   Recent Labs   Lab 01/05/20 0421 01/06/20 0521    136   K 4.0 4.5   CL 91* 92*   CO2 25 23   GLU 71 73   BUN 40* 44*   CREATININE 5.5* 6.4*   CALCIUM 7.6* 7.6*   ANIONGAP 20* 21*   EGFRNONAA 7.1* 5.9*     Magnesium:   Recent Labs   Lab 01/05/20 0421 01/06/20 0521   MG 2.0 2.2           Assessment/Plan:      * Left atrial thrombus  When patient came to ER CT scan was done which revealed a suspected left atrial mass/thrombus  2D echo confirmed left atrial thrombus  Other possible diagnosis - ?atrial mass (leiomyocarcoma)   Cardiology following   Now agreed to anticogualtion; staretd warfarin 3 mg qhs - warfarin teaching by pharmacy   Plan for TRUPTI for further evaluation of left atrial mass vs thrombus   Will also need CTA chest         Loculated pleural effusion  Loculated pleural effusion of the right side and very minimal left pleural effusion per CT scan   Dialysis as per nephrology   Most likely fluid overload  CXR on admission with small right sided pleural effusion and RLL opacity (unlikely empyema)      PVD (peripheral vascular disease)  Stable chronic issue      Seizures  Resume antiseizure medication      Volume overload  Patient today had hemodialysis      Chronic respiratory failure  Patient normally on 3 L of oxygen at home      Pulmonary hypertension  Pulmonary hypertension per chart review  Echocardiogram 01/03 - mild to moderate pulm HTN with PASP of 50 mm Hg      Troponin I above reference range  Expected in the background of end-stage renal disease/fluid overload  No chest pain       Wound of left leg  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    Atrial fibrillation  Continue home medications  Patient not on blood thinners earlier; now with LA thrombus   Started on warfarin  3 mg qhs; appreciate cardiology input     Calciphylaxis  Outpatient wound care         Hyperparathyroidism  Hyperparathyroidism is expected in end-stage renal disease      ESRD (end stage renal disease) on HD M,W, F  Nephrology following   Binders and dialysis as per nephrology     Tobacco dependence  Tobacco cessation counseling provided      HTN (hypertension)  Continue present medication  BP level stable      VTE Risk Mitigation (From admission, onward)         Ordered     warfarin (COUMADIN) tablet 3 mg  Daily      01/05/20 1244     heparin (porcine) injection 5,000 Units  Every 8 hours      01/03/20 0318     IP VTE HIGH RISK PATIENT  Once      01/03/20 0318                      Homer Carlson MD  Department of Hospital Medicine   Formerly [facility]

## 2020-01-06 NOTE — PROGRESS NOTES
No acute overnight issues    AA  On HD  RRR, no rub  Lungs clearer B  S/nd abd  No LE edema    Vital Signs    Report   View Graph    01/04 0700 01/05 0659 01/05 0700 01/05 2252  Most Recent     Temp (°F) 97.4-97.9 97-98.5  97 (36.1)  01/05 1915   Pulse 54 -85 57 -101  80  01/05 1915   Resp 18-20 18  18  01/05 1915   /55 -164/97  110//81   138/59   01/05 1915   MAP (mmHg)          SpO2 (%) 93 -100   97  01/05 1500   Weight (kg) 63.3         Intake/Output    Report   View Table   Output Total  2,500mL  1/5 0700 - 1/6 0659  65243/407001/5  2,500  500  1,120  240  +1,120  -1,760  1,120  (17.7)  740  (11.7)  2,500  2 x  Report   Scheduled     Medication Last Action   aspirin chewable tablet 81 mg Given   81 mg, Daily, Oral    01/05 0916   calcium acetate capsule 1,334 mg Given   1,334 mg, TID WM, Oral    01/05 0915   cholestyramine-aspartame 4 gram packet 4 g Given   4 g, BID, Oral    01/03 1451   diltiaZEM 24 hr capsule 180 mg Given   180 mg, Daily, Oral    01/05 0916   fluticasone furoate-vilanterol 100-25 mcg/dose diskus inhaler 1 puff Given   1 puff, Daily, Inhl    01/05 0739   heparin (porcine) injection 5,000 Units Ordered   5,000 Units, Q8H, SubQ       isosorbide mononitrate 24 hr tablet 30 mg Given   30 mg, Daily, Oral    01/05 0916   losartan tablet 25 mg Given   25 mg, Daily, Oral    01/05 0915   magnesium oxide tablet 400 mg Given   400 mg, Daily, Oral    01/05 0916   metoprolol succinate (TOPROL-XL) 24 hr tablet 50 mg Given   50 mg, Daily, Oral    01/05 0916   mupirocin 2 % ointment Ordered   No Dose/Rate, BID, Nasl       sevelamer HCl tablet 800 mg Given   800 mg, TID WM, Oral    01/05 0916   sodium thiosulfate 12.5 gram/50 mL (250 mg/mL) injection 25 g New Bag   25 g, Every Mon, Wed, Fri, IV    01/03 1324   warfarin (COUMADIN) tablet 3 mg Given   3 mg, Daily, Oral    01/05 1723      PRN     Medication Last Action   acetaminophen tablet 650 mg Given   650 mg, Q6H PRN, Oral    01/04  2257   dextrose 50% injection 12.5 g Ordered   12.5 g, PRN, IV       dextrose 50% injection 25 g Ordered   25 g, PRN, IV       glucagon (human recombinant) injection 1 mg Ordered   1 mg, PRN, IM       glucose chewable tablet 16 g Ordered   16 g, PRN, Oral       glucose chewable tablet 24 g Ordered   24 g, PRN, Oral       hydrOXYzine pamoate capsule 25 mg Given   25 mg, Q8H PRN, Oral    01/04 2257   loperamide capsule 2 mg Ordered   2 mg, QID PRN, Oral       ondansetron disintegrating tablet 4 mg Ordered   4 mg, Q6H PRN, Oral       sodium chloride 0.9% flush 10 mL Ordered        Selected Labs    Report   (Up to last 2 results from the past 72 hours)   Switch View    01/03 1129 01/04 0415 01/05 0421   Sodium --     137     136       Potassium --     4.3     4.0       Chloride --     93Low      91Low        CO2 --     22Low      25       BUN, Bld --     33High      40High        Creatinine --     5.0High      5.5High        Glucose --     67Low      71       Magnesium --     --     2.0       WBC --     7.86     6.50       Hemoglobin --     9.2Low      9.1Low        Hematocrit --     28.9Low      28.9Low        Platelets --     185     191       Lactate, Ned --     1.7     --       Troponin I 0.122High Panic      --     --                   ASSESSMENT/PLAN:      1.  Altered mental status  resolved     2.  ESRD  MWF HD from here  UF today for hypervolemia     3.  Secondary hyperparathyroidism  Monitor calcium and phosphorus     4.  Calciphylaxis  Sodium thiosulfate post dialysis MWF     5.  Hypertension  Acceptable for now  Continue home meds     6.  Anemia  Declining Hgb  Restart DONNY

## 2020-01-06 NOTE — SUBJECTIVE & OBJECTIVE
Interval History:  No acute overnight events reported.  Patient had dialysis last night as per Nephrology.  Patient denies any shortness of breath, chest pain, lightheadedness/dizziness, focal weakness, sensory deficit or seizure-like activity.      Objective:     Vital Signs (Most Recent):  Temp: 98 °F (36.7 °C) (01/06/20 1210)  Pulse: (!) 56 (01/06/20 1400)  Resp: 18 (01/06/20 1210)  BP: 132/65 (01/06/20 1400)  SpO2: 98 % (01/06/20 0855) Vital Signs (24h Range):  Temp:  [97 °F (36.1 °C)-98 °F (36.7 °C)] 98 °F (36.7 °C)  Pulse:  [47-97] 56  Resp:  [16-18] 18  SpO2:  [96 %-100 %] 98 %  BP: (110-148)/(57-86) 132/65     Weight: 63.3 kg (139 lb 7.1 oz)  Body mass index is 23.2 kg/m².    Intake/Output Summary (Last 24 hours) at 1/6/2020 1445  Last data filed at 1/5/2020 1915  Gross per 24 hour   Intake 500 ml   Output 2500 ml   Net -2000 ml      Physical Exam   Constitutional: She is oriented to person, place, and time. No distress.   Frail elderly female    HENT:   Head: Normocephalic and atraumatic.   Eyes: Pupils are equal, round, and reactive to light. EOM are normal.   Neck: Neck supple. No thyromegaly present.   Cardiovascular: Normal rate and regular rhythm.   No murmur heard.  Pulmonary/Chest: Effort normal and breath sounds normal.   Reduced reduced air entry to bases   Abdominal: Soft. Bowel sounds are normal.   Musculoskeletal: She exhibits no edema or deformity.   Neurological: She is alert and oriented to person, place, and time.   Moving all extremities equally    Skin: Skin is warm.   Bandage intact on left thigh   Psychiatric: She has a normal mood and affect. Her behavior is normal.   Nursing note and vitals reviewed.      Significant Labs:   CBC:   Recent Labs   Lab 01/05/20  0421 01/06/20  0521   WBC 6.50 6.60   HGB 9.1* 9.8*   HCT 28.9* 30.7*    216     CMP:   Recent Labs   Lab 01/05/20  0421 01/06/20  0521    136   K 4.0 4.5   CL 91* 92*   CO2 25 23   GLU 71 73   BUN 40* 44*    CREATININE 5.5* 6.4*   CALCIUM 7.6* 7.6*   ANIONGAP 20* 21*   EGFRNONAA 7.1* 5.9*     Magnesium:   Recent Labs   Lab 01/05/20  0421 01/06/20  0521   MG 2.0 2.2

## 2020-01-06 NOTE — ASSESSMENT & PLAN NOTE
When patient came to ER CT scan was done which revealed a suspected left atrial mass/thrombus  2D echo confirmed left atrial thrombus  Cardiology consulted; pt refused anticoagulation in the past even with hx of pAF   Now agreed to it; staretd warfarin 2.5 mg qhs - warfarin teaching by pharmacy

## 2020-01-06 NOTE — ASSESSMENT & PLAN NOTE
Loculated pleural effusion of the right side and very minimal left pleural effusion per CT scan   Dialysis as per nephrology   Most likely fluid overload  CXR with small right sided pleural effusion and RLL opacity (unlikely empyema)

## 2020-01-07 ENCOUNTER — ANESTHESIA EVENT (OUTPATIENT)
Dept: CARDIOLOGY | Facility: HOSPITAL | Age: 75
DRG: 252 | End: 2020-01-07
Payer: MEDICARE

## 2020-01-07 ENCOUNTER — CLINICAL SUPPORT (OUTPATIENT)
Dept: CARDIOLOGY | Facility: HOSPITAL | Age: 75
DRG: 252 | End: 2020-01-07
Attending: INTERNAL MEDICINE
Payer: MEDICARE

## 2020-01-07 ENCOUNTER — ANESTHESIA (OUTPATIENT)
Dept: CARDIOLOGY | Facility: HOSPITAL | Age: 75
DRG: 252 | End: 2020-01-07
Payer: MEDICARE

## 2020-01-07 PROBLEM — G93.40 ENCEPHALOPATHY ACUTE: Status: ACTIVE | Noted: 2020-01-07

## 2020-01-07 PROBLEM — E87.70 VOLUME OVERLOAD: Status: RESOLVED | Noted: 2019-10-18 | Resolved: 2020-01-07

## 2020-01-07 PROBLEM — J90 LOCULATED PLEURAL EFFUSION: Status: RESOLVED | Noted: 2020-01-03 | Resolved: 2020-01-07

## 2020-01-07 PROBLEM — T82.858A AV FISTULA STENOSIS: Status: ACTIVE | Noted: 2020-01-07

## 2020-01-07 PROBLEM — I48.19 OTHER PERSISTENT ATRIAL FIBRILLATION: Status: ACTIVE | Noted: 2017-07-18

## 2020-01-07 LAB
ANION GAP SERPL CALC-SCNC: 17 MMOL/L (ref 8–16)
BUN SERPL-MCNC: 25 MG/DL (ref 8–23)
CALCIUM SERPL-MCNC: 7.7 MG/DL (ref 8.7–10.5)
CHLORIDE SERPL-SCNC: 94 MMOL/L (ref 95–110)
CO2 SERPL-SCNC: 26 MMOL/L (ref 23–29)
CREAT SERPL-MCNC: 4.7 MG/DL (ref 0.5–1.4)
ERYTHROCYTE [DISTWIDTH] IN BLOOD BY AUTOMATED COUNT: 18.6 % (ref 11.5–14.5)
EST. GFR  (AFRICAN AMERICAN): 9.9 ML/MIN/1.73 M^2
EST. GFR  (NON AFRICAN AMERICAN): 8.6 ML/MIN/1.73 M^2
GLUCOSE SERPL-MCNC: 71 MG/DL (ref 70–110)
HCT VFR BLD AUTO: 32.6 % (ref 37–48.5)
HGB BLD-MCNC: 10 G/DL (ref 12–16)
INR PPP: 1.1
MAGNESIUM SERPL-MCNC: 2.1 MG/DL (ref 1.6–2.6)
MCH RBC QN AUTO: 27.1 PG (ref 27–31)
MCHC RBC AUTO-ENTMCNC: 30.7 G/DL (ref 32–36)
MCV RBC AUTO: 88 FL (ref 82–98)
PLATELET # BLD AUTO: 193 K/UL (ref 150–350)
PMV BLD AUTO: 10.6 FL (ref 9.2–12.9)
POTASSIUM SERPL-SCNC: 3.8 MMOL/L (ref 3.5–5.1)
PROTHROMBIN TIME: 13.7 SEC (ref 10.6–14.8)
RBC # BLD AUTO: 3.69 M/UL (ref 4–5.4)
SODIUM SERPL-SCNC: 137 MMOL/L (ref 136–145)
VANCOMYCIN SERPL-MCNC: 15.7 UG/ML
WBC # BLD AUTO: 5.07 K/UL (ref 3.9–12.7)

## 2020-01-07 PROCEDURE — 37000008 HC ANESTHESIA 1ST 15 MINUTES: Performed by: INTERNAL MEDICINE

## 2020-01-07 PROCEDURE — 93321 DOPPLER ECHO F-UP/LMTD STD: CPT

## 2020-01-07 PROCEDURE — 25000003 PHARM REV CODE 250: Performed by: INTERNAL MEDICINE

## 2020-01-07 PROCEDURE — 85610 PROTHROMBIN TIME: CPT

## 2020-01-07 PROCEDURE — 94660 CPAP INITIATION&MGMT: CPT

## 2020-01-07 PROCEDURE — 21400001 HC TELEMETRY ROOM

## 2020-01-07 PROCEDURE — 63600175 PHARM REV CODE 636 W HCPCS: Performed by: SURGERY

## 2020-01-07 PROCEDURE — 27000221 HC OXYGEN, UP TO 24 HOURS

## 2020-01-07 PROCEDURE — C1887 CATHETER, GUIDING: HCPCS | Performed by: SURGERY

## 2020-01-07 PROCEDURE — 63600175 PHARM REV CODE 636 W HCPCS: Performed by: NURSE ANESTHETIST, CERTIFIED REGISTERED

## 2020-01-07 PROCEDURE — C1894 INTRO/SHEATH, NON-LASER: HCPCS | Performed by: SURGERY

## 2020-01-07 PROCEDURE — 80048 BASIC METABOLIC PNL TOTAL CA: CPT

## 2020-01-07 PROCEDURE — 37000009 HC ANESTHESIA EA ADD 15 MINS: Performed by: INTERNAL MEDICINE

## 2020-01-07 PROCEDURE — 63600175 PHARM REV CODE 636 W HCPCS: Performed by: INTERNAL MEDICINE

## 2020-01-07 PROCEDURE — 99900035 HC TECH TIME PER 15 MIN (STAT)

## 2020-01-07 PROCEDURE — 83735 ASSAY OF MAGNESIUM: CPT

## 2020-01-07 PROCEDURE — 94640 AIRWAY INHALATION TREATMENT: CPT

## 2020-01-07 PROCEDURE — 36903 INTRO CATH DIALYSIS CIRCUIT: CPT | Performed by: SURGERY

## 2020-01-07 PROCEDURE — 85027 COMPLETE CBC AUTOMATED: CPT

## 2020-01-07 PROCEDURE — 25000003 PHARM REV CODE 250: Performed by: SURGERY

## 2020-01-07 PROCEDURE — C1876 STENT, NON-COA/NON-COV W/DEL: HCPCS | Performed by: SURGERY

## 2020-01-07 PROCEDURE — 99152 MOD SED SAME PHYS/QHP 5/>YRS: CPT | Performed by: SURGERY

## 2020-01-07 PROCEDURE — C1725 CATH, TRANSLUMIN NON-LASER: HCPCS | Performed by: SURGERY

## 2020-01-07 PROCEDURE — 80202 ASSAY OF VANCOMYCIN: CPT

## 2020-01-07 PROCEDURE — 36415 COLL VENOUS BLD VENIPUNCTURE: CPT

## 2020-01-07 PROCEDURE — C1769 GUIDE WIRE: HCPCS | Performed by: SURGERY

## 2020-01-07 PROCEDURE — 99153 MOD SED SAME PHYS/QHP EA: CPT | Performed by: SURGERY

## 2020-01-07 PROCEDURE — 94761 N-INVAS EAR/PLS OXIMETRY MLT: CPT

## 2020-01-07 DEVICE — VIABAHN SX ENDO HEPARIN 18 RO 8MMX5CM 7FR 120CM CATH
Type: IMPLANTABLE DEVICE | Site: ARM | Status: FUNCTIONAL
Brand: GORE VIABAHN ENDOPROSTHESIS WITH HEPARIN

## 2020-01-07 RX ORDER — WARFARIN SODIUM 5 MG/1
5 TABLET ORAL DAILY
Status: DISCONTINUED | OUTPATIENT
Start: 2020-01-07 | End: 2020-01-08 | Stop reason: HOSPADM

## 2020-01-07 RX ORDER — PROPOFOL 10 MG/ML
VIAL (ML) INTRAVENOUS
Status: DISCONTINUED | OUTPATIENT
Start: 2020-01-07 | End: 2020-01-07

## 2020-01-07 RX ORDER — ONDANSETRON 2 MG/ML
INJECTION INTRAMUSCULAR; INTRAVENOUS
Status: DISCONTINUED | OUTPATIENT
Start: 2020-01-07 | End: 2020-01-07 | Stop reason: HOSPADM

## 2020-01-07 RX ORDER — MIDAZOLAM HYDROCHLORIDE 1 MG/ML
INJECTION INTRAMUSCULAR; INTRAVENOUS
Status: DISCONTINUED | OUTPATIENT
Start: 2020-01-07 | End: 2020-01-07 | Stop reason: HOSPADM

## 2020-01-07 RX ORDER — SODIUM CHLORIDE 9 MG/ML
INJECTION, SOLUTION INTRAVENOUS CONTINUOUS PRN
Status: DISCONTINUED | OUTPATIENT
Start: 2020-01-07 | End: 2020-01-07

## 2020-01-07 RX ORDER — ENOXAPARIN SODIUM 100 MG/ML
1 INJECTION SUBCUTANEOUS ONCE
Status: COMPLETED | OUTPATIENT
Start: 2020-01-07 | End: 2020-01-07

## 2020-01-07 RX ORDER — FENTANYL CITRATE 50 UG/ML
INJECTION, SOLUTION INTRAMUSCULAR; INTRAVENOUS
Status: DISCONTINUED | OUTPATIENT
Start: 2020-01-07 | End: 2020-01-07 | Stop reason: HOSPADM

## 2020-01-07 RX ORDER — LIDOCAINE HYDROCHLORIDE 10 MG/ML
INJECTION, SOLUTION EPIDURAL; INFILTRATION; INTRACAUDAL; PERINEURAL
Status: DISCONTINUED | OUTPATIENT
Start: 2020-01-07 | End: 2020-01-07 | Stop reason: HOSPADM

## 2020-01-07 RX ADMIN — FLUTICASONE FUROATE AND VILANTEROL TRIFENATATE 1 PUFF: 100; 25 POWDER RESPIRATORY (INHALATION) at 07:01

## 2020-01-07 RX ADMIN — WARFARIN SODIUM 5 MG: 5 TABLET ORAL at 05:01

## 2020-01-07 RX ADMIN — ENOXAPARIN SODIUM 60 MG: 100 INJECTION SUBCUTANEOUS at 06:01

## 2020-01-07 RX ADMIN — PROPOFOL 25 MG: 10 INJECTION, EMULSION INTRAVENOUS at 08:01

## 2020-01-07 RX ADMIN — ASPIRIN 81 MG 81 MG: 81 TABLET ORAL at 02:01

## 2020-01-07 RX ADMIN — METOPROLOL SUCCINATE 50 MG: 50 TABLET, FILM COATED, EXTENDED RELEASE ORAL at 02:01

## 2020-01-07 RX ADMIN — PROPOFOL 10 MG: 10 INJECTION, EMULSION INTRAVENOUS at 08:01

## 2020-01-07 RX ADMIN — MAGNESIUM OXIDE 400 MG: 400 TABLET ORAL at 02:01

## 2020-01-07 RX ADMIN — SODIUM CHLORIDE: 9 INJECTION, SOLUTION INTRAVENOUS at 08:01

## 2020-01-07 RX ADMIN — DILTIAZEM HYDROCHLORIDE 180 MG: 180 CAPSULE, COATED, EXTENDED RELEASE ORAL at 02:01

## 2020-01-07 RX ADMIN — ISOSORBIDE MONONITRATE 30 MG: 30 TABLET, EXTENDED RELEASE ORAL at 02:01

## 2020-01-07 NOTE — PROGRESS NOTES
"[facility]  Adult Nutrition   Progress Note (Initial Assessment)     SUMMARY     Recommendations  Recommendation/Intervention:   1. Patient is non-compliant with renal diet and is receiving outside foods. Encourage compliance and adequate PO intake.  2. Hyperphosphatemia, recommend continued monitoring and medical management. Patient has sevelamer HCl ordered.     Goals:   1. Patient to meet estimated energy and protein need via PO intake.   2. Phosphorus to trend towards normal limits.      Nutrition Goal Status: new  Communication of RD Recs: reviewed with RN    Reason for Assessment    Reason For Assessment: length of stay  Diagnosis: renal disease  Relevant Medical History: ESRD on HD, Tobacco dependence,  Calciphylaxis, Pulmonary hypertension, Hyperparathyroidism, Wound of left leg,  Volume overload, Loculated pleural effusion   General Information Comments: patient known to RD due to recurrent hospitalization, non compliant with diet     Nutrition Risk Screen    Nutrition Risk Screen: no indicators present       Wound 01/03/20 1723 Other (comment) medial Thigh #1-Wound Image: Images linked       Nutrition/Diet History    Patient Reported Diet/Restrictions/Preferences: general  Spiritual, Cultural Beliefs, Scientology Practices, Values that Affect Care: no  Food Allergies: NKFA  Factors Affecting Nutritional Intake: other (see comments)(dislikes hospital foods )    Anthropometrics    Temp: 97.4 °F (36.3 °C)  Height: 5' 5" (165.1 cm)  Height (inches): 65 in  Weight Method: Standard Scale  Weight: 63.3 kg (139 lb 7.1 oz)  Weight (lb): 139.44 lb  Ideal Body Weight (IBW), Female: 125 lb  % Ideal Body Weight, Female (lb): 104 %  BMI (Calculated): 23.2  BMI Grade: 18.5-24.9 - normal       Weight History:  Wt Readings from Last 10 Encounters:   01/05/20 63.3 kg (139 lb 7.1 oz)   12/21/19 59.4 kg (130 lb 14.4 oz)   10/25/19 58.3 kg (128 lb 8.5 oz)   10/22/19 55 kg (121 lb 4.1 oz)   10/03/19 58.1 kg (128 " lb)   09/30/19 61 kg (134 lb 7.7 oz)   09/07/19 58 kg (127 lb 13.9 oz)   08/24/19 53.5 kg (118 lb)   08/19/19 58.9 kg (129 lb 13.6 oz)   03/07/19 66.2 kg (146 lb)       Lab/Procedures/Meds: Pertinent Labs Reviewed  Clinical Chemistry:  Recent Labs   Lab 01/02/20 2200 01/04/20  0415  01/06/20  0521    137   < > 136   K 4.7 4.3   < > 4.5   CL 90* 93*   < > 92*   CO2 23 22*   < > 23   GLU 72 67*   < > 73   BUN 46* 33*   < > 44*   CREATININE 6.6* 5.0*   < > 6.4*   CALCIUM 7.3* 7.6*   < > 7.6*   PROT 7.4 6.9  --   --    ALBUMIN 2.9* 2.7*  --   --    BILITOT 1.3* 1.0  --   --    ALKPHOS 209* 167*  --   --    AST 76* 50*  --   --    ALT 23 28  --   --    ANIONGAP 27* 22*   < > 21*   ESTGFRAFRICA 6.6* 9.2*   < > 6.8*   EGFRNONAA 5.7* 8.0*   < > 5.9*   MG 1.8  --    < > 2.2   PHOS 8.0*  --   --   --    LIPASE 71*  --   --   --     < > = values in this interval not displayed.     CBC:   Recent Labs   Lab 01/06/20  0521   WBC 6.60   RBC 3.51*   HGB 9.8*   HCT 30.7*      MCV 88   MCH 27.9   MCHC 31.9*     Cardiac Profile:  Recent Labs   Lab 01/02/20 2200 01/03/20  0459 01/03/20  1129   BNP >4,500*  --   --    TROPONINI 0.143* 0.129* 0.122*     Diabetes:  Lab Results   Component Value Date    HGBA1C 5.0 10/19/2019     Medications: Pertinent Medications reviewed  Scheduled Meds:   aspirin  81 mg Oral Daily    calcium acetate  1,334 mg Oral TID WM    cholestyramine-aspartame  1 packet Oral BID    diltiaZEM  180 mg Oral Daily    fluticasone furoate-vilanterol  1 puff Inhalation Daily    heparin (porcine)  5,000 Units Subcutaneous Q8H    isosorbide mononitrate  30 mg Oral Daily    losartan  25 mg Oral Daily    magnesium oxide  400 mg Oral Daily    metoprolol succinate  50 mg Oral Daily    mupirocin   Nasal BID    sevelamer HCl  800 mg Oral TID WM    sodium thiosulfate  25 g Intravenous Every Mon, Wed, Fri    warfarin  3 mg Oral Daily     Continuous Infusions:  PRN Meds:.acetaminophen, dextrose 50%,  dextrose 50%, glucagon (human recombinant), glucose, glucose, hydrOXYzine pamoate, loperamide, ondansetron, sodium chloride 0.9%, traMADol, Pharmacy to dose Vancomycin consult **AND** vancomycin - pharmacy to dose    Estimated/Assessed Needs    Weight Used For Calorie Calculations: 63.3 kg (139 lb 8.8 oz)  Energy Calorie Requirements (kcal): 1583 - 1899 (25 - 30)   Energy Need Method: Kcal/kg  Protein Requirements: 76 - 95 (1.2 - 1.5)   Weight Used For Protein Calculations: 63.3 kg (139 lb 8.8 oz)  Fluid Requirements (mL): UOP + 1000 ml/day or per MD   Estimated Fluid Requirement Method: other (see comments)  RDA Method (mL): 1583       Nutrition Prescription Ordered    Current Diet Order: Renal     Evaluation of Received Nutrient/Fluid Intake    Energy Calories Required: meeting needs  Protein Required: meeting needs  Fluid Required: meeting needs  Tolerance: tolerating     Intake/Output Summary (Last 24 hours) at 1/6/2020 2017  Last data filed at 1/6/2020 1500  Gross per 24 hour   Intake 620 ml   Output 2500 ml   Net -1880 ml      % Intake of Estimated Energy Needs: 50 - 75 %  % Meal Intake: 50 - 75 %    Dietitian Rounds Brief    Patient assessed 2' LOS. Patient is not eating much of meals provided. Patient is getting foods brought from outside by family and is eating foods provided well. Non-compliant with renal diet. Patient refused oral nutrition supplements.      Nutrition Risk    Level of Risk/Frequency of Follow-up: moderate     Monitor and Evaluation    Food and Nutrient Intake: food and beverage intake, energy intake  Food and Nutrient Adminstration: diet order  Knowledge/Beliefs/Attitudes: food and nutrition knowledge/skill, beliefs and attitudes  Physical Activity and Function: nutrition-related ADLs and IADLs, factors affecting access to physical activity  Anthropometric Measurements: weight, weight change  Biochemical Data, Medical Tests and Procedures: electrolyte and renal panel, gastrointestinal  profile, inflammatory profile, glucose/endocrine profile, lipid profile  Nutrition-Focused Physical Findings: overall appearance     Nutrition Follow-Up    RD Follow-up?: Yes    Bel Leo RD 01/06/2020 8:17 PM

## 2020-01-07 NOTE — INTERVAL H&P NOTE
The patient has been examined and the H&P has been reviewed:    I concur with the findings and changes have been noted since the H&P was written: Please see my progress note from today    Anesthesia/Surgery risks, benefits and alternative options discussed and understood by patient/family.          Active Hospital Problems    Diagnosis  POA    *Left atrial thrombus [I51.3]  Yes    Loculated pleural effusion [J90]  Yes    Seizures [R56.9]  Yes     Chronic    PVD (peripheral vascular disease) [I73.9]  Yes     Chronic    Volume overload [E87.70]  Yes    Chronic respiratory failure [J96.10]  Yes     Chronic     3 L O2 at home      Pulmonary hypertension [I27.20]  Yes     Chronic    Troponin I above reference range [R79.89]  Yes    Wound of left leg [S81.802A]  Yes    Atrial fibrillation [I48.91]  Yes     Chronic    Hyperparathyroidism [E21.3]  Yes    Calciphylaxis [E83.59]  Yes     Chronic    ESRD (end stage renal disease) on HD M,W, F [N18.6]  Yes     Chronic    Tobacco dependence [F17.200]  Yes     Chronic    HTN (hypertension) [I10]  Yes     Chronic      Resolved Hospital Problems    Diagnosis Date Resolved POA    Encephalopathy acute [G93.40] 01/04/2020 Yes

## 2020-01-07 NOTE — ASSESSMENT & PLAN NOTE
Yesterday patient had delayed post dialysis bleeding from the AV fistula site after which vascular surgery was consulted  01/07 - Underwent AV fistulogram with placement of stent in the proximal right basilic vein AV fistula  Stable post procedure  Monitor clinically

## 2020-01-07 NOTE — TRANSFER OF CARE
"Anesthesia Transfer of Care Note    Patient: Griselda Neely    Procedure(s) Performed: Procedure(s) (LRB):  Transesophageal echo (TRUPTI) intra-procedure log documentation (N/A)    [facility]: [facility]    Anesthesia Type: MAC    Post pain: adequate analgesia    Post assessment: no apparent anesthetic complications    Post vital signs: stable    Level of consciousness: awake, alert and oriented    Nausea/Vomiting: no nausea/vomiting    Complications: none    Transfer of care protocol was followed      Last vitals:   Visit Vitals  /72 (BP Location: Left arm, Patient Position: Lying)   Pulse 106   Temp 36.4 °C (97.5 °F) (Oral)   Resp 18   Ht 5' 5" (1.651 m)   Wt 53.8 kg (118 lb 8 oz)   LMP  (LMP Unknown)   SpO2 98%   Breastfeeding? No   BMI 19.72 kg/m²     "

## 2020-01-07 NOTE — PLAN OF CARE
01/07/20 0723   Patient Assessment/Suction   Level of Consciousness (AVPU) alert   Respiratory Effort Normal;Unlabored   Expansion/Accessory Muscles/Retractions no use of accessory muscles;no retractions   All Lung Fields Breath Sounds crackles   Rhythm/Pattern, Respiratory unlabored;pattern regular;depth regular   PRE-TX-O2   O2 Device (Oxygen Therapy) nasal cannula   $ Is the patient on Low Flow Oxygen? Yes   Flow (L/min) 2   SpO2 98 %   Pulse Oximetry Type Intermittent   $ Pulse Oximetry - Multiple Charge Pulse Oximetry - Multiple   Pulse 106   Resp 18   Inhaler   $ Inhaler Charges MDI (Metered Dose Inahler) Treatment;Mouth rinsed post treatment   Daily Review of Necessity (Inhaler) completed   Respiratory Treatment Status (Inhaler) given;mouth rinsed post treatment   Treatment Route (Inhaler) mouthpiece   Patient Position (Inhaler) semi-Clark's   Post Treatment Assessment (Inhaler) breath sounds unchanged   Signs of Intolerance (Inhaler) none

## 2020-01-07 NOTE — PLAN OF CARE
Problem: Wound  Goal: Optimal Wound Healing  Outcome: Ongoing, Progressing     Problem: Adult Inpatient Plan of Care  Goal: Plan of Care Review  Outcome: Ongoing, Progressing  Goal: Patient-Specific Goal (Individualization)  Outcome: Ongoing, Progressing  Goal: Absence of Hospital-Acquired Illness or Injury  Outcome: Ongoing, Progressing  Goal: Optimal Comfort and Wellbeing  Outcome: Ongoing, Progressing  Goal: Readiness for Transition of Care  Outcome: Ongoing, Progressing  Goal: Rounds/Family Conference  Outcome: Ongoing, Progressing     Problem: Diabetes Comorbidity  Goal: Blood Glucose Level Within Desired Range  Outcome: Ongoing, Progressing     Problem: Infection  Goal: Infection Symptom Resolution  Outcome: Ongoing, Progressing     Problem: Fall Injury Risk  Goal: Absence of Fall and Fall-Related Injury  Outcome: Ongoing, Progressing     Problem: Skin Injury Risk Increased  Goal: Skin Health and Integrity  Outcome: Ongoing, Progressing     Problem: Device-Related Complication Risk (Hemodialysis)  Goal: Safe, Effective Therapy Delivery  Outcome: Ongoing, Progressing     Problem: Hemodynamic Instability (Hemodialysis)  Goal: Vital Signs Remain in Desired Range  Outcome: Ongoing, Progressing     Problem: Infection (Hemodialysis)  Goal: Absence of Infection Signs/Symptoms  Outcome: Ongoing, Progressing     Problem: Oral Intake Inadequate  Goal: Improved Oral Intake  Outcome: Ongoing, Progressing

## 2020-01-07 NOTE — PROGRESS NOTES
[facility]  Cardiology  Progress Note    Patient Name: Griselda Neely  MRN: [id number]  Admission Date: 1/2/2020  Hospital Length of Stay: 1 days  Code Status: Full Code   Attending Physician: Homer Carlson MD   Primary Care Physician: Kalie Neely MD  Expected Discharge Date:   Principal Problem:Left atrial thrombus    Subjective:       Interval History: Denies any chest pain or shortness of breath.     ROS   Denies any focal weakness.   Denies any abdominal pain.   Denies any bleeding issues.     Objective:     Vital Signs (Most Recent):  Temp: (Refused vital signs) (01/07/20 0300)  Pulse: 106 (01/07/20 0723)  Resp: 18 (01/07/20 0723)  BP: 134/72 (01/06/20 2300)  SpO2: 98 % (01/07/20 0723) Vital Signs (24h Range):  Temp:  [97.4 °F (36.3 °C)-98 °F (36.7 °C)] 97.5 °F (36.4 °C)  Pulse:  [] 106  Resp:  [16-20] 18  SpO2:  [93 %-98 %] 98 %  BP: (120-152)/(65-86) 134/72     Weight: 53.8 kg (118 lb 8 oz)  Body mass index is 19.72 kg/m².    SpO2: 98 %  O2 Device (Oxygen Therapy): nasal cannula      Intake/Output Summary (Last 24 hours) at 1/7/2020 0728  Last data filed at 1/6/2020 1500  Gross per 24 hour   Intake 620 ml   Output 2500 ml   Net -1880 ml       Lines/Drains/Airways     Drain                 Hemodialysis AV Fistula 08/08/19 0214 Right upper arm 152 days          Peripheral Intravenous Line                 Peripheral IV - Single Lumen 01/03/20 1915 20 G Anterior;Left;Proximal Forearm 3 days                Scheduled Meds:   aspirin  81 mg Oral Daily    calcium acetate  1,334 mg Oral TID WM    cholestyramine-aspartame  1 packet Oral BID    diltiaZEM  180 mg Oral Daily    fluticasone furoate-vilanterol  1 puff Inhalation Daily    heparin (porcine)  5,000 Units Subcutaneous Q8H    isosorbide mononitrate  30 mg Oral Daily    losartan  25 mg Oral Daily    magnesium oxide  400 mg Oral Daily    metoprolol succinate  50 mg Oral Daily    mupirocin   Nasal BID    sevelamer HCl   800 mg Oral TID WM    sodium thiosulfate  25 g Intravenous Every Mon, Wed, Fri    warfarin  3 mg Oral Daily     Continuous Infusions:  PRN Meds:.acetaminophen, dextrose 50%, dextrose 50%, glucagon (human recombinant), glucose, glucose, hydrOXYzine pamoate, loperamide, ondansetron, sodium chloride 0.9%, traMADol, Pharmacy to dose Vancomycin consult **AND** vancomycin - pharmacy to dose     Physical Exam  HEENT: Normocephalic, atraumatic, PERRL, Conjunctiva pink, no scleral icterus.   CVS: S1S2+, Irregular, SM+, no rubs or gallops, JVP: Normal.  LUNGS: No wheezes or crackles.   ABDOMEN: Soft, NT, BS+  EXTREMITIES: No cyanosis, clubbing or edema  NEURO: AAO X 3.       Significant Labs:   Blood Culture: No results for input(s): LABBLOO in the last 48 hours., BMP:   Recent Labs   Lab 01/06/20  0521 01/07/20  0444   GLU 73 71    137   K 4.5 3.8   CL 92* 94*   CO2 23 26   BUN 44* 25*   CREATININE 6.4* 4.7*   CALCIUM 7.6* 7.7*   MG 2.2 2.1   , CMP   Recent Labs   Lab 01/06/20  0521 01/07/20  0444    137   K 4.5 3.8   CL 92* 94*   CO2 23 26   GLU 73 71   BUN 44* 25*   CREATININE 6.4* 4.7*   CALCIUM 7.6* 7.7*   ANIONGAP 21* 17*   ESTGFRAFRICA 6.8* 9.9*   EGFRNONAA 5.9* 8.6*   , CBC   Recent Labs   Lab 01/06/20  0521 01/07/20  0444   WBC 6.60 5.07   HGB 9.8* 10.0*   HCT 30.7* 32.6*    193   , INR   Recent Labs   Lab 01/07/20  0548   INR 1.1   , Lipid Panel No results for input(s): CHOL, HDL, LDLCALC, TRIG, CHOLHDL in the last 48 hours. and Troponin No results for input(s): TROPONINI in the last 48 hours.    Significant Imaging: Reviewed.   Assessment and Plan:     IMPRESSION:  1. Possible left atrial mass. CT Abdomen with left atrial filling defect. Patient agreeable to coumadin now. Discussed TRUPTI.   2. Congestive heart failure. Chronic. LVEF ~25% on last echo. Volume overload on exam.   3. COPD.  4. Calciphlyaxis. Reportedly better.   5. Atrial fibrillation. Valvular. Persistent.   6. Hypertension. Better  controlled at present. Elevates with agitation. Periods of hypotension. Seems better.   7. s/p MVR via right thoracotomy approach on 3/18/19 with Dr. Lora. Functioning appropriately per last echo.   8. CAD. Nonobstructive on LHC done on 3/13/2019.   9. End-stage renal disease on hemodialysis MWF. Anuric. Followed by Dr. Ingram.   10. Chronic tobacco abuse.      PLAN:  1. Discussed TRUPTI with the patient as well as her sisters. She is agreeable to proceeding. Will proceed with TRUPTI with MAC with Anesthesia help. Indications, risks, benefits as well as alternatives were discussed with the patient and informed consent was obtained.     Katharina Powell MD  Cardiology  [facility]

## 2020-01-07 NOTE — CONSULTS
[facility]  Vascular Surgery  Consult Note    Consults  Subjective:     Chief Complaint/Reason for Admission:  Patient admitted for atrial thrombus    History of Present Illness:  Has a history of end-stage renal disease with a right arm AV fistula which she reports having prolonged post dialysis bleeding.    Medications Prior to Admission   Medication Sig Dispense Refill Last Dose    fluticasone furoate-vilanterol (BREO) 100-25 mcg/dose diskus inhaler Inhale 1 puff into the lungs once daily. Controller   Past Month at Unknown time    ondansetron (ZOFRAN-ODT) 4 MG TbDL Take 1 tablet (4 mg total) by mouth every 6 (six) hours as needed. 60 tablet 4 1/2/2020 at Unknown time    traMADol (ULTRAM) 50 mg tablet Take 1 tablet (50 mg total) by mouth 3 (three) times a week. 3 TIMES A WEEK AS NEEDED DURING HEMODIALYSIS 90 tablet 3 1/1/2020 at Unknown time    aspirin 81 MG Chew Take 81 mg by mouth once daily.    Unknown at Unknown time    calcium acetate (PHOSLO) 667 mg capsule Take 1,334 mg by mouth 3 (three) times daily with meals.   Unknown at Unknown time    diltiaZEM (CARDIZEM CD) 180 MG 24 hr capsule Take 180 mg by mouth once daily.   12/20/2019 at 22:30    isosorbide mononitrate (IMDUR) 30 MG 24 hr tablet Take 30 mg by mouth once daily.   12/20/2019 at 22:30    levETIRAcetam (KEPPRA) 500 MG Tab Take 500 mg by mouth every Mon, Wed, Fri. After diaylsis       losartan (COZAAR) 25 MG tablet Take 25 mg by mouth once daily.   12/20/2019 at 22:30    magnesium oxide (MAG-OX) 400 mg (241.3 mg magnesium) tablet Take 400 mg by mouth once daily.   Unknown at Unknown time    metoprolol succinate (TOPROL-XL) 50 MG 24 hr tablet Take 1 tablet (50 mg total) by mouth once daily. (Patient taking differently: Take 50 mg by mouth once daily. ) 30 tablet 11 12/20/2019 at 22:30    oxyCODONE-acetaminophen (PERCOCET) 5-325 mg per tablet Take 1 tablet by mouth daily as needed for Pain (with dialysis).    More than a  month at Unknown time    sevelamer HCl (RENAGEL) 800 MG Tab Take 1 tablet (800 mg total) by mouth 3 (three) times daily with meals. 90 tablet 0 Unknown at Unknown time       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    both legs    CHF (congestive heart failure)     Encounter for blood transfusion 03/2016    Gout     Hypertension     Mitral valve regurgitation     Osteoarthritis     Pancreatitis     Peripheral vascular disease     Peritoneal dialysis catheter in place     Pneumonia 09/09/2017    Renal failure      Past Surgical History:   Procedure Laterality Date    ACHILLES TENDON SURGERY Right     APPENDECTOMY      CARDIAC CATHETERIZATION  07/03/2017    CHOLECYSTECTOMY      heal surgery Right     HYSTERECTOMY      PARATHYROIDECTOMY      PARATHYROIDECTOMY  07/13/2017    PERITONEAL CATHETER INSERTION      RENAL BIOPSY      vocal cord nodule       Family History     Problem Relation (Age of Onset)    Arthritis Mother    Diabetes Mother, Father    Early death Sister, Sister    Heart disease Sister, Maternal Grandfather, Mother    Heart failure Mother    Hypertension Mother        Tobacco Use    Smoking status: Current Some Day Smoker     Packs/day: 0.50     Years: 45.00     Pack years: 22.50    Smokeless tobacco: Never Used   Substance and Sexual Activity    Alcohol use: No    Drug use: No    Sexual activity: Not on file     Review of Systems   Constitutional: Positive for fatigue.   Respiratory: Negative.    Cardiovascular: Negative.    Gastrointestinal: Negative.    Musculoskeletal: Negative.    Skin: Negative.      Objective:     Vital Signs (Most Recent):  Temp: (Refused vital signs) (01/07/20 0300)  Pulse: 93 (01/07/20 0900)  Resp: (!) 22 (01/07/20 0900)  BP: 122/79 (01/07/20 0900)  SpO2: (!) 85 % (01/07/20 0900) Vital Signs (24h Range):  Temp:  [97.4 °F (36.3 °C)-98 °F (36.7 °C)] 97.5  °F (36.4 °C)  Pulse:  [] 93  Resp:  [16-22] 22  SpO2:  [85 %-98 %] 85 %  BP: (120-152)/(65-86) 122/79     Weight: 53.8 kg (118 lb 8 oz)  Body mass index is 19.72 kg/m².    Date 01/07/20 0700 - 01/08/20 0659   Shift 9691-3576 5876-1876 1251-1278 24 Hour Total   INTAKE   I.V.(mL/kg) 50(0.9)   50(0.9)   Shift Total(mL/kg) 50(0.9)   50(0.9)   OUTPUT   Shift Total(mL/kg)       Weight (kg) 53.8 53.8 53.8 53.8       Physical Exam   Constitutional: She appears well-developed.   Neck: Normal range of motion. Neck supple. Carotid bruit is not present.   Cardiovascular: Normal rate and regular rhythm.   Right upper arm basilic vein transposition AV fistula with excellent thrill in the upper portion and slightly elevated pulse distally   Pulmonary/Chest: Breath sounds normal.   Abdominal: Soft. Bowel sounds are normal.   Musculoskeletal: Normal range of motion.       Significant Labs:  BMP:   Recent Labs   Lab 01/07/20  0444   GLU 71      K 3.8   CL 94*   CO2 26   BUN 25*   CREATININE 4.7*   CALCIUM 7.7*   MG 2.1       Significant Diagnostics:      Assessment/Plan:   Right arm AV fistulogram to assess for central stenosis  Active Diagnoses:    Diagnosis Date Noted POA    PRINCIPAL PROBLEM:  Left atrial thrombus [I51.3] 01/03/2020 Yes    Loculated pleural effusion [J90] 01/03/2020 Yes    Seizures [R56.9] 12/20/2019 Yes     Chronic    PVD (peripheral vascular disease) [I73.9] 12/20/2019 Yes     Chronic    Volume overload [E87.70] 10/18/2019 Yes    Chronic respiratory failure [J96.10] 05/13/2019 Yes     Chronic    Pulmonary hypertension [I27.20] 07/25/2017 Yes     Chronic    Troponin I above reference range [R79.89] 07/25/2017 Yes    Wound of left leg [S81.802A] 07/20/2017 Yes    Atrial fibrillation [I48.91] 07/18/2017 Yes     Chronic    Hyperparathyroidism [E21.3] 07/18/2017 Yes    Calciphylaxis [E83.59] 07/18/2017 Yes     Chronic    ESRD (end stage renal disease) on HD M,W, F [N18.6] 05/11/2016 Yes      Chronic    Tobacco dependence [F17.200] 09/09/2013 Yes     Chronic    HTN (hypertension) [I10] 08/15/2013 Yes     Chronic      Problems Resolved During this Admission:    Diagnosis Date Noted Date Resolved POA    Encephalopathy acute [G93.40] 01/03/2020 01/04/2020 Yes       Thank you for your consult. I will follow-up with patient. Please contact us if you have any additional questions.    Joseph Loyola MD  Vascular Surgery  [facility]

## 2020-01-07 NOTE — ANESTHESIA PREPROCEDURE EVALUATION
01/07/2020  Griselda Neely is a 74 y.o., female.  Patient Active Problem List   Diagnosis    HTN (hypertension)    PND (paroxysmal nocturnal dyspnea)    Tobacco dependence    ESRD (end stage renal disease) on HD M,W, F    Hyperparathyroidism    Calciphylaxis    Atrial fibrillation    Wound of left leg    Anemia due to chronic kidney disease    Troponin I above reference range    Pulmonary hypertension    Non-rheumatic mitral regurgitation    Diastolic heart failure    Hypoglycemia    DNR (do not resuscitate)    Chronic respiratory failure    Coronary arteriosclerosis in native artery    Gastroesophageal reflux disease    S/P parathyroidectomy    Chronic obstructive pulmonary disease    Tension-type headache    Hyperparathyroidism due to renal insufficiency    Functional dyspepsia    Acute on chronic respiratory failure with hypoxia    Volume overload    Congestive heart failure    Pulmonary edema    Transaminitis    Seizures    HLD (hyperlipidemia)    PVD (peripheral vascular disease)    Left atrial thrombus    Loculated pleural effusion       Past Surgical History:   Procedure Laterality Date    ACHILLES TENDON SURGERY Right     APPENDECTOMY      CARDIAC CATHETERIZATION  07/03/2017    CHOLECYSTECTOMY      heal surgery Right     HYSTERECTOMY      PARATHYROIDECTOMY      PARATHYROIDECTOMY  07/13/2017    PERITONEAL CATHETER INSERTION      RENAL BIOPSY      vocal cord nodule          Tobacco Use:  The patient  reports that she has been smoking. She has a 22.50 pack-year smoking history. She has never used smokeless tobacco.     Results for orders placed or performed during the hospital encounter of 01/02/20   EKG 12-lead    Collection Time: 01/03/20 12:07 AM    Narrative    Test Reason : I48.91,    Vent. Rate : 092 BPM     Atrial Rate : 110 BPM     P-R Int : 000 ms           QRS Dur : 090 ms      QT Int : 404 ms       P-R-T Axes : 000 -33 094 degrees     QTc Int : 499 ms    Atrial fibrillation  Left axis deviation  Nonspecific ST and T wave abnormality  Prolonged QT  Abnormal ECG  When compared with ECG of 02-JAN-2020 21:25,  Vent. rate has decreased BY  45 BPM  Nonspecific T wave abnormality now evident in Inferior leads  Confirmed by Doyle Mcknight MD ([id number]) on 1/3/2020 10:55:12 AM    Referred By: [email or web address]   SELF           Confirmed By:Doyle Mcknight MD             Lab Results   Component Value Date    WBC 5.07 01/07/2020    HGB 10.0 (L) 01/07/2020    HCT 32.6 (L) 01/07/2020    MCV 88 01/07/2020     01/07/2020     BMP  Lab Results   Component Value Date     01/07/2020    K 3.8 01/07/2020    CL 94 (L) 01/07/2020    CO2 26 01/07/2020    BUN 25 (H) 01/07/2020    CREATININE 4.7 (H) 01/07/2020    CALCIUM 7.7 (L) 01/07/2020    ANIONGAP 17 (H) 01/07/2020    ESTGFRAFRICA 9.9 (A) 01/07/2020    EGFRNONAA 8.6 (A) 01/07/2020       Echocardiography Findings     Left Ventricle Severe decreased ejection fraction at 25%. Eccentric hypertrophy observed. Grade IV (severe) left ventricular diastolic dysfunction. Elevated left atrial pressure.   Right Ventricle The right ventricle is moderately dilated. The systolic function is moderately reduced.   Left Atrium There is severe left atrial enlargement. Layered thrombus suggested. The thrombus is fixed and located in the inferior cavity.   Right Atrium There is moderate right atrial enlargement.   Aortic Valve Aortic valve sclerosis is moderate. The LVOT diameter is 2.09 cm.   Mitral Valve Trace regurgitation. There is a bioprosthetic mitral valve. The prosthetic valve is normal.   Tricuspid Valve Moderate regurgitation. Mild to moderate pulmonary hypertension present.   Pulmonic Valve Mild to moderate regurgitation.   Pericardium Small posterior pericardial effusion. No cardiac tamponade present.       Pre-op Assessment    I have reviewed  the Patient Summary Reports.     I have reviewed the Nursing Notes.   I have reviewed the Medications.     Review of Systems  Anesthesia Hx:  History of prior surgery of interest to airway management or planning: Previous anesthesia: General   Social:  Smoker, No Alcohol Use    Hematology/Oncology:         -- Anemia: Hematology Comments: Anemia secondary to CKD   Cardiovascular:   Hypertension, poorly controlled Valvular problems/Murmurs, MR CAD  Dysrhythmias atrial fibrillation CHF Orthopnea PND PVD hyperlipidemia ARGUELLO ECG has been reviewed. Hx. Lt. Atrial Thrombus   Diastolic Heart Failure   EF 25% Chronic CHF   Pulmonary HTN   Pulmonary:   Pneumonia COPD, severe Shortness of breath Acute on chronic respiratory failure with hypoxia   Rt. Pleural Effusion   Home O2 3L/min   Renal/:   Chronic Renal Disease, Dialysis, CRI, ESRD Dialysis M,W,F   Hepatic/GI:   GERD    Musculoskeletal:   Arthritis  Left Leg Wound   Neurological:   Headaches Seizures    Endocrine:  Endocrine Normal Hypoglycemia Denies Thyroid Disease  Parathyroid Disease, Hyperparathyroidism, s/p parathyroidectomy Hyperparathyroidism secondary to renal insufficiency               Anesthesia Plan  Type of Anesthesia, risks & benefits discussed:  Anesthesia Type:  MAC  Patient's Preference: MAC  Intra-op Monitoring Plan: standard ASA monitors  Intra-op Monitoring Plan Comments:   Post Op Pain Control Plan: per primary service following discharge from PACU  Post Op Pain Control Plan Comments:   Induction:    Beta Blocker:  Patient is on a Beta-Blocker and has received one dose within the past 24 hours (No further documentation required).       Informed Consent:    ASA Score: 4     Day of Surgery Review of History & Physical:        Anesthesia Plan Notes: MAC  No IV lidocaine secondary to Hx Seizures   Minimal IVF's         Ready For Surgery From Anesthesia Perspective.

## 2020-01-07 NOTE — PROGRESS NOTES
Progress Note  [facility]    Admit Date: 1/2/2020   LOS: 1 day     SUBJECTIVE:     Follow-up For:  ESRD    Stable pain in her left leg.  TRUPTI with left atrial clot.    Review of Systems:  GENERAL: NO fever, chills, night sweats, decreased intake  HEENT: NO blurry vision, glasses, floaters, hearing defects, sore throat  CV:  NO CP, Palpitations, Edema, Arrhythmias  PULM:  Stable SOB, ARUGELLO. No Hemoptysis, PND, Orthopnea  GI:  NO Nausea, Vomiting, Diarrhea, BRBPR, Melena, Abdominal Pain  :  Anuric  NEURO: NO LOC, Seizure activity, Dizziness  SKIN:  NO Rash, Pruritis, Petechiae  MS:  NO Arthralgia, Muscle Aches, Arthritis      OBJECTIVE:     Vital Signs (Most Recent)  Temp: (Refused vital signs) (01/07/20 0300)  Pulse: 105 (01/07/20 1300)  Resp: 16 (01/07/20 1215)  BP: 111/79 (01/07/20 1300)  SpO2: 100 % (01/07/20 1300)    Vital Signs Range (Last 24H):  Temp:  [97.4 °F (36.3 °C)-98 °F (36.7 °C)]   Pulse:  []   Resp:  [16-32]   BP: ()/(53-93)   SpO2:  [85 %-100 %]     I/O last 3 completed shifts:  In: 1120 [P.O.:120; Other:1000]  Out: 5000 [Other:5000]    Physical Exam:   General: Awake and alert. In no acute distress  HEENT: No scleral icterus, MM moist.   NECK: JVD. Supple,  No swelling, No masses.  CV: Regular rate and rhthym without murmurs, rubs, gallops.  PULM:  Few rhonchi but mostly clear.   ABD: Soft, nl BS, NT, ND.  EXTR: No edema, clubbing, cyanosis.   NEURO: Non focal and grossly intact.  SKIN: No rashes, lesions, ulcers.   MS: No joint effusions.   PSYCH: Normal Mood.    Laboratory:  Recent Labs   Lab 01/07/20  0444   HGB 10.0*   HCT 32.6*   WBC 5.07          Recent Labs   Lab 01/02/20  2200  01/05/20  0421 01/06/20  0521 01/07/20  0444      < > 136 136 137   K 4.7   < > 4.0 4.5 3.8   CL 90*   < > 91* 92* 94*   CO2 23   < > 25 23 26   BUN 46*   < > 40* 44* 25*   CREATININE 6.6*   < > 5.5* 6.4* 4.7*   GLU 72   < > 71 73 71   CALCIUM 7.3*   < > 7.6* 7.6* 7.7*    PHOS 8.0*  --   --   --   --     < > = values in this interval not displayed.       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]  Unknown    Encephalopathy acute [G93.40]  Yes    Left atrial thrombus [I51.3]  Yes    Loculated pleural effusion [J90]  Yes    Seizures [R56.9]  Yes     Chronic    PVD (peripheral vascular disease) [I73.9]  Yes     Chronic    Volume overload [E87.70]  Yes    Chronic respiratory failure [J96.10]  Yes     Chronic     3 L O2 at home      Pulmonary hypertension [I27.20]  Yes     Chronic    Troponin I above reference range [R79.89]  Yes    Wound of left leg [S81.802A]  Yes    Atrial fibrillation [I48.91]  Yes     Chronic    Hyperparathyroidism [E21.3]  Yes    Calciphylaxis [E83.59]  Yes     Chronic    ESRD (end stage renal disease) on HD M,W, F [N18.6]  Yes     Chronic    Tobacco dependence [F17.200]  Yes     Chronic    HTN (hypertension) [I10]  Yes     Chronic      Resolved Hospital Problems    Diagnosis Date Resolved POA    Encephalopathy acute [G93.40] 01/04/2020 Yes       Nephrology Assessment/Plan:  1.  Altered mental status  resolved     2.  ESRD  Patient is anuric so no need for post cath HD  Volume looks good but UF as tolerated  Continue MWF HD     3.  Secondary hyperparathyroidism  Monitor calcium and phosphorus     4.  Calciphylaxis  Sodium thiosulfate post dialysis MWF     5.  Hypertension  Acceptable for now  Continue home meds     6.  Anemia  At goal  Maintenance DONNY's    7.  Left atrial blood clot  Anticoagulation as per hospital medicine    Joseph Ingram M.D.

## 2020-01-07 NOTE — PROGRESS NOTES
Patient insisting to have a bed bath now and is sitting up at bedside with a wash basin and towels. She does not want staff help. Instructed to call for assist

## 2020-01-07 NOTE — SUBJECTIVE & OBJECTIVE
Interval History:  Seen and examined at bedside in the afternoon.  Patient underwent TRUPTI this morning which confirmed left atrial thrombus.  She also underwent AV fistulogram which revealed stenosis of the basilic vein and she is now status post basilic vein stenting.  Patient endorses generalized weakness.  She denies chest pain, shortness of breath, palpitations or lightheadedness/dizziness.      Objective:     Vital Signs (Most Recent):  Temp: 97.7 °F (36.5 °C) (01/07/20 1500)  Pulse: 89 (01/07/20 1500)  Resp: 18 (01/07/20 1500)  BP: 135/71 (01/07/20 1500)  SpO2: 100 % (01/07/20 1500) Vital Signs (24h Range):  Temp:  [97.5 °F (36.4 °C)-97.9 °F (36.6 °C)] 97.7 °F (36.5 °C)  Pulse:  [] 89  Resp:  [16-32] 18  SpO2:  [85 %-100 %] 100 %  BP: ()/(53-93) 135/71     Weight: 53.8 kg (118 lb 8 oz)  Body mass index is 19.72 kg/m².    Intake/Output Summary (Last 24 hours) at 1/7/2020 1643  Last data filed at 1/7/2020 0837  Gross per 24 hour   Intake 50 ml   Output --   Net 50 ml      Physical Exam   Constitutional: She is oriented to person, place, and time. No distress.   Frail elderly female    HENT:   Head: Normocephalic and atraumatic.   Eyes: Pupils are equal, round, and reactive to light. EOM are normal.   Neck: Neck supple. No thyromegaly present.   Cardiovascular: Normal rate and regular rhythm.   No murmur heard.  RUE fistula dressing is clean, dry and intact   Pulmonary/Chest: Effort normal and breath sounds normal.   Reduced reduced air entry to bases   Abdominal: Soft. Bowel sounds are normal.   Musculoskeletal: She exhibits no edema or deformity.   Neurological: She is alert and oriented to person, place, and time.   Moving all extremities equally    Skin: Skin is warm.   Bandage intact on left thigh   Psychiatric: She has a normal mood and affect. Her behavior is normal.   Nursing note and vitals reviewed.      Significant Labs:   CBC:   Recent Labs   Lab 01/06/20  0521 01/07/20  0444   WBC 6.60  5.07   HGB 9.8* 10.0*   HCT 30.7* 32.6*    193     CMP:   Recent Labs   Lab 01/06/20  0521 01/07/20  0444    137   K 4.5 3.8   CL 92* 94*   CO2 23 26   GLU 73 71   BUN 44* 25*   CREATININE 6.4* 4.7*   CALCIUM 7.6* 7.7*   ANIONGAP 21* 17*   EGFRNONAA 5.9* 8.6*     Magnesium:   Recent Labs   Lab 01/06/20  0521 01/07/20  0444   MG 2.2 2.1       Significant Imaging: I have reviewed all pertinent imaging results/findings within the past 24 hours.     AV fistulogram with intervention:    · A STENT VIABAHN 8MM X 5CM stent was successfully placed.        Date: 1/7/2020  Time: 11:36 AM  Right basilic vein AV fistula accessed and 6 Jamaican sheath placed fistulogram was performed up the arm and then we upsized to a 7 Jamaican sheath.  We dilated the mid basilic vein and proximal basilic vein stenoses with an 8 mm Conquest balloon and then stented the proximal stenosis with a 8 x 5 viabahn stent

## 2020-01-07 NOTE — ANESTHESIA POSTPROCEDURE EVALUATION
Anesthesia Post Evaluation    Patient: Griselda Neely    Procedure(s) Performed: Procedure(s) (LRB):  Transesophageal echo (TRUPTI) intra-procedure log documentation (N/A)    Final Anesthesia Type: MAC    Patient location during evaluation: PACU  Patient participation: Yes- Able to Participate  Level of consciousness: awake and alert  Post-procedure vital signs: reviewed and stable  Pain management: adequate  Airway patency: patent    PONV status at discharge: No PONV  Anesthetic complications: no      Cardiovascular status: blood pressure returned to baseline and stable  Respiratory status: unassisted and room air  Hydration status: euvolemic  Follow-up not needed.          Vitals Value Taken Time   /79 1/7/2020  9:00 AM   Temp 36.4 °C (97.5 °F) 1/6/2020 11:00 PM   Pulse 100 1/7/2020  9:05 AM   Resp 19 1/7/2020  9:05 AM   SpO2 98% 1/7/2020  9:05 AM   Vitals shown include unvalidated device data.      No case tracking events are documented in the log.      Pain/Skip Score: Skip Score: 8 (1/7/2020  8:56 AM)

## 2020-01-07 NOTE — PLAN OF CARE
Problem: Oral Intake Inadequate  Goal: Improved Oral Intake  Outcome: Ongoing, Progressing  Intervention: Promote and Optimize Oral Intake  Flowsheets (Taken 1/6/2020 2018)  Oral Nutrition Promotion: other (see comments)     Recommendation/Intervention:   Patient is non-compliant with renal diet and is receiving outside foods. Encourage compliance and adequate PO intake.  Hyperphosphatemia, recommend continued monitoring and medical management. Patient has sevelamer HCl ordered.     Goals:   Patient to meet estimated energy and protein need via PO intake.   Phosphorus to trend towards normal limits.

## 2020-01-07 NOTE — PROGRESS NOTES
[facility] Medicine  Progress Note    Patient Name: Griselda Neely  MRN: [id number]  Patient Class: IP- Inpatient   Admission Date: 1/2/2020  Length of Stay: 1 days  Attending Physician: Homer Carlson MD  Primary Care Provider: Kalie Neely MD        Subjective:     Principal Problem:Left atrial thrombus      Interval History:  Seen and examined at bedside in the afternoon.  Patient underwent TRUPTI this morning which confirmed left atrial thrombus.  She also underwent AV fistulogram which revealed stenosis of the basilic vein and she is now status post basilic vein stenting.  Patient endorses generalized weakness.  She denies chest pain, shortness of breath, palpitations or lightheadedness/dizziness.      Objective:     Vital Signs (Most Recent):  Temp: 97.7 °F (36.5 °C) (01/07/20 1500)  Pulse: 89 (01/07/20 1500)  Resp: 18 (01/07/20 1500)  BP: 135/71 (01/07/20 1500)  SpO2: 100 % (01/07/20 1500) Vital Signs (24h Range):  Temp:  [97.5 °F (36.4 °C)-97.9 °F (36.6 °C)] 97.7 °F (36.5 °C)  Pulse:  [] 89  Resp:  [16-32] 18  SpO2:  [85 %-100 %] 100 %  BP: ()/(53-93) 135/71     Weight: 53.8 kg (118 lb 8 oz)  Body mass index is 19.72 kg/m².    Intake/Output Summary (Last 24 hours) at 1/7/2020 1643  Last data filed at 1/7/2020 0837  Gross per 24 hour   Intake 50 ml   Output --   Net 50 ml      Physical Exam   Constitutional: She is oriented to person, place, and time. No distress.   Frail elderly female    HENT:   Head: Normocephalic and atraumatic.   Eyes: Pupils are equal, round, and reactive to light. EOM are normal.   Neck: Neck supple. No thyromegaly present.   Cardiovascular: Normal rate and regular rhythm.   No murmur heard.  RUE fistula dressing is clean, dry and intact   Pulmonary/Chest: Effort normal and breath sounds normal.   Reduced reduced air entry to bases   Abdominal: Soft. Bowel sounds are normal.   Musculoskeletal: She exhibits no edema or deformity.    Neurological: She is alert and oriented to person, place, and time.   Moving all extremities equally    Skin: Skin is warm.   Bandage intact on left thigh   Psychiatric: She has a normal mood and affect. Her behavior is normal.   Nursing note and vitals reviewed.      Significant Labs:   CBC:   Recent Labs   Lab 01/06/20  0521 01/07/20  0444   WBC 6.60 5.07   HGB 9.8* 10.0*   HCT 30.7* 32.6*    193     CMP:   Recent Labs   Lab 01/06/20 0521 01/07/20  0444    137   K 4.5 3.8   CL 92* 94*   CO2 23 26   GLU 73 71   BUN 44* 25*   CREATININE 6.4* 4.7*   CALCIUM 7.6* 7.7*   ANIONGAP 21* 17*   EGFRNONAA 5.9* 8.6*     Magnesium:   Recent Labs   Lab 01/06/20 0521 01/07/20 0444   MG 2.2 2.1       Significant Imaging: I have reviewed all pertinent imaging results/findings within the past 24 hours.     AV fistulogram with intervention:    · A STENT VIABAHN 8MM X 5CM stent was successfully placed.        Date: 1/7/2020  Time: 11:36 AM  Right basilic vein AV fistula accessed and 6 Cymraes sheath placed fistulogram was performed up the arm and then we upsized to a 7 Cymraes sheath.  We dilated the mid basilic vein and proximal basilic vein stenoses with an 8 mm Conquest balloon and then stented the proximal stenosis with a 8 x 5 viabahn stent          Assessment/Plan:      * Left atrial thrombus  When patient came to ER CT scan was done which revealed a suspected left atrial mass/thrombus  2D echo confirmed left atrial thrombus  Other possible diagnosis - ?atrial mass (leiomyocarcoma)   Cardiology following   Now agreed to anticogualtion; staretd warfarin 3 mg qhs - warfarin teaching by pharmacy   Plan for TRUPTI for further evaluation of left atrial mass vs thrombus   Will also need CTA chest         AV fistula stenosis  Yesterday patient had delayed post dialysis bleeding from the AV fistula site after which vascular surgery was consulted  01/07 - Underwent AV fistulogram with placement of stent in the proximal  right basilic vein AV fistula  Stable post procedure  Monitor clinically      PVD (peripheral vascular disease)  Stable chronic issue      Seizures  Resume antiseizure medication      Chronic respiratory failure  Patient normally on 3 L of oxygen at home      Pulmonary hypertension  Pulmonary hypertension per chart review  Echocardiogram 01/03 - mild to moderate pulm HTN with PASP of 50 mm Hg      Troponin I above reference range  Expected in the background of end-stage renal disease/fluid overload  No chest pain       Other persistent atrial fibrillation  Continue home medications  Patient not on blood thinners earlier; now with LA thrombus   Started on warfarin 3 mg qhs; appreciate cardiology input     Calciphylaxis  Outpatient wound care   Sodium thiosulfate post dialysis         Hyperparathyroidism  Hyperparathyroidism is expected in end-stage renal disease      ESRD (end stage renal disease) on HD M,W, F  Nephrology following   Binders and dialysis as per nephrology     Tobacco dependence  Tobacco cessation counseling provided      HTN (hypertension)  Continue present medication  BP level stable      VTE Risk Mitigation (From admission, onward)         Ordered     warfarin (COUMADIN) tablet 3 mg  Daily      01/05/20 1244     IP VTE HIGH RISK PATIENT  Once      01/03/20 0318                      Homer Carlson MD  Department of Hospital Medicine   [facility]

## 2020-01-07 NOTE — PLAN OF CARE
01/06/20 2003   PRE-TX-O2   O2 Device (Oxygen Therapy) nasal cannula   Flow (L/min) 2   SpO2 96 %   Pulse Oximetry Type Intermittent   Pulse 84   Resp 17   Wound Care   $ Wound Care Tech Time 15 min   Area of Concern Bridge of nose;Lower lip   Skin Color/Characteristics without discoloration   Skin Temperature warm   Preset CPAP/BiPAP Settings   Mode Of Delivery BiPAP S/T;Standby   $ CPAP/BiPAP Daily Charge BiPAP/CPAP Daily   $ Initial CPAP/BiPAP Setup? No   $ Is patient using? No/refused

## 2020-01-07 NOTE — PROGRESS NOTES
No acute overnight issues    AA  NAD  RRR, no rub, no LE edema  Lungs much improved B  S/nt abd      Vital Signs    Report   View Graph    01/05 0700 01/06 0659 01/06 0700 01/06 1829  Most Recent     Temp (°F) 97-98.5 97.4-98  97.4 (36.3)  01/06 1617   Pulse 57 -101 47 -81  81  01/06 1617   Resp 16-18 16-20  20  01/06 1617   //81  120//71   152/71   01/06 1617   SpO2 (%)  93 -98  93   01/06 1617   Intake/Output    Report   View Table   Other  2,500mL  1/6 0700 - 1/7 0659  02026/257325/6  2,500  2,500  500  500  240  120  -1,760  -1,880  740  (11.7)  620  (9.8)  2,500  2,500  1 x  1 x  P.O.  Other  In  Out  Other  Net  Stool Occurrence  Report   Scheduled     Medication Last Action   aspirin chewable tablet 81 mg Given   81 mg, Daily, Oral    01/05 0916   calcium acetate capsule 1,334 mg Given   1,334 mg, TID WM, Oral    01/06 1812   cholestyramine-aspartame 4 gram packet 4 g Given   4 g, BID, Oral    01/03 1451   diltiaZEM 24 hr capsule 180 mg Given   180 mg, Daily, Oral    01/05 0916   fluticasone furoate-vilanterol 100-25 mcg/dose diskus inhaler 1 puff Given   1 puff, Daily, Inhl    01/06 0855   heparin (porcine) injection 5,000 Units Ordered   5,000 Units, Q8H, SubQ       isosorbide mononitrate 24 hr tablet 30 mg Given   30 mg, Daily, Oral    01/05 0916   losartan tablet 25 mg Given   25 mg, Daily, Oral    01/05 0915   magnesium oxide tablet 400 mg Given   400 mg, Daily, Oral    01/05 0916   metoprolol succinate (TOPROL-XL) 24 hr tablet 50 mg Given   50 mg, Daily, Oral    01/05 0916   mupirocin 2 % ointment Ordered   No Dose/Rate, BID, Nasl       sevelamer HCl tablet 800 mg Given   800 mg, TID WM, Oral    01/06 1812   sodium thiosulfate 12.5 gram/50 mL (250 mg/mL) injection 25 g New Bag   25 g, Every Mon, Wed, Fri, IV    01/03 1324   warfarin (COUMADIN) tablet 3 mg Given   3 mg, Daily, Oral    01/05 1723      PRN     Medication Last Action   acetaminophen tablet 650 mg Given   650 mg,  Q6H PRN, Oral    01/04 2257   dextrose 50% injection 12.5 g Ordered   12.5 g, PRN, IV       dextrose 50% injection 25 g Ordered   25 g, PRN, IV       glucagon (human recombinant) injection 1 mg Ordered        Selected Labs    Report   (Up to last 2 results from the past 72 hours)   Switch View    01/04 0415 01/05 0421 01/06 0521   Sodium --     136     136       Potassium --     4.0     4.5       Chloride --     91Low      92Low        CO2 --     25     23       BUN, Bld --     40High      44High        Creatinine --     5.5High      6.4High        Glucose --     71     73       Magnesium --     2.0     2.2       WBC --     6.50     6.60       Hemoglobin --     9.1Low      9.8Low        Hematocrit --     28.9Low      30.7Low        Platelets --     191     216       Lactate, Ned 1.7     --     --            ASSESSMENT/PLAN:      1.  Altered mental status  resolved     2.  ESRD  MWF HD from here     3.  Secondary hyperparathyroidism  Monitor calcium and phosphorus     4.  Calciphylaxis  Sodium thiosulfate post dialysis MWF     5.  Hypertension  Acceptable for now  Continue home meds     6.  Anemia  Declining Hgb  Restart DONNY

## 2020-01-08 VITALS
RESPIRATION RATE: 18 BRPM | WEIGHT: 117.63 LBS | OXYGEN SATURATION: 99 % | SYSTOLIC BLOOD PRESSURE: 129 MMHG | HEART RATE: 80 BPM | HEIGHT: 65 IN | TEMPERATURE: 97 F | DIASTOLIC BLOOD PRESSURE: 82 MMHG | BODY MASS INDEX: 19.6 KG/M2

## 2020-01-08 PROBLEM — T82.858A AV FISTULA STENOSIS: Status: RESOLVED | Noted: 2020-01-07 | Resolved: 2020-01-08

## 2020-01-08 PROBLEM — R56.9 SEIZURES: Chronic | Status: RESOLVED | Noted: 2019-12-20 | Resolved: 2020-01-08

## 2020-01-08 PROBLEM — G93.40 ENCEPHALOPATHY ACUTE: Status: RESOLVED | Noted: 2020-01-07 | Resolved: 2020-01-08

## 2020-01-08 PROBLEM — R79.89 TROPONIN I ABOVE REFERENCE RANGE: Status: RESOLVED | Noted: 2017-07-25 | Resolved: 2020-01-08

## 2020-01-08 LAB
ANION GAP SERPL CALC-SCNC: 16 MMOL/L (ref 8–16)
BACTERIA BLD CULT: NORMAL
BACTERIA BLD CULT: NORMAL
BUN SERPL-MCNC: 29 MG/DL (ref 8–23)
CALCIUM SERPL-MCNC: 7.6 MG/DL (ref 8.7–10.5)
CHLORIDE SERPL-SCNC: 95 MMOL/L (ref 95–110)
CO2 SERPL-SCNC: 24 MMOL/L (ref 23–29)
CREAT SERPL-MCNC: 5.5 MG/DL (ref 0.5–1.4)
ERYTHROCYTE [DISTWIDTH] IN BLOOD BY AUTOMATED COUNT: 18.8 % (ref 11.5–14.5)
EST. GFR  (AFRICAN AMERICAN): 8.2 ML/MIN/1.73 M^2
EST. GFR  (NON AFRICAN AMERICAN): 7.1 ML/MIN/1.73 M^2
GLUCOSE SERPL-MCNC: 60 MG/DL (ref 70–110)
HCT VFR BLD AUTO: 31.5 % (ref 37–48.5)
HGB BLD-MCNC: 9.9 G/DL (ref 12–16)
INR PPP: 1.3
MAGNESIUM SERPL-MCNC: 2.2 MG/DL (ref 1.6–2.6)
MCH RBC QN AUTO: 27.9 PG (ref 27–31)
MCHC RBC AUTO-ENTMCNC: 31.4 G/DL (ref 32–36)
MCV RBC AUTO: 89 FL (ref 82–98)
PLATELET # BLD AUTO: 190 K/UL (ref 150–350)
PMV BLD AUTO: 11 FL (ref 9.2–12.9)
POTASSIUM SERPL-SCNC: 4.4 MMOL/L (ref 3.5–5.1)
PROTHROMBIN TIME: 15.8 SEC (ref 10.6–14.8)
RBC # BLD AUTO: 3.55 M/UL (ref 4–5.4)
SODIUM SERPL-SCNC: 135 MMOL/L (ref 136–145)
WBC # BLD AUTO: 4.7 K/UL (ref 3.9–12.7)

## 2020-01-08 PROCEDURE — 94640 AIRWAY INHALATION TREATMENT: CPT

## 2020-01-08 PROCEDURE — 83735 ASSAY OF MAGNESIUM: CPT

## 2020-01-08 PROCEDURE — 85610 PROTHROMBIN TIME: CPT

## 2020-01-08 PROCEDURE — 90935 HEMODIALYSIS ONE EVALUATION: CPT

## 2020-01-08 PROCEDURE — 63600175 PHARM REV CODE 636 W HCPCS: Performed by: INTERNAL MEDICINE

## 2020-01-08 PROCEDURE — 80048 BASIC METABOLIC PNL TOTAL CA: CPT

## 2020-01-08 PROCEDURE — 25000003 PHARM REV CODE 250: Performed by: INTERNAL MEDICINE

## 2020-01-08 PROCEDURE — 94761 N-INVAS EAR/PLS OXIMETRY MLT: CPT

## 2020-01-08 PROCEDURE — 27000221 HC OXYGEN, UP TO 24 HOURS

## 2020-01-08 PROCEDURE — 85027 COMPLETE CBC AUTOMATED: CPT

## 2020-01-08 PROCEDURE — 36415 COLL VENOUS BLD VENIPUNCTURE: CPT

## 2020-01-08 PROCEDURE — 99900035 HC TECH TIME PER 15 MIN (STAT)

## 2020-01-08 PROCEDURE — 94660 CPAP INITIATION&MGMT: CPT

## 2020-01-08 RX ORDER — WARFARIN SODIUM 5 MG/1
5 TABLET ORAL DAILY
Qty: 30 TABLET | Refills: 11 | Status: ON HOLD | OUTPATIENT
Start: 2020-01-08 | End: 2020-02-05 | Stop reason: HOSPADM

## 2020-01-08 RX ORDER — ENOXAPARIN SODIUM 100 MG/ML
60 INJECTION SUBCUTANEOUS DAILY
Qty: 3 ML | Refills: 0 | Status: SHIPPED | OUTPATIENT
Start: 2020-01-08 | End: 2020-01-13

## 2020-01-08 RX ORDER — ENOXAPARIN SODIUM 100 MG/ML
50 INJECTION SUBCUTANEOUS
Status: DISCONTINUED | OUTPATIENT
Start: 2020-01-08 | End: 2020-01-08 | Stop reason: HOSPADM

## 2020-01-08 RX ORDER — SODIUM CHLORIDE 9 MG/ML
INJECTION, SOLUTION INTRAVENOUS
Status: CANCELLED | OUTPATIENT
Start: 2020-01-08

## 2020-01-08 RX ORDER — SODIUM CHLORIDE 9 MG/ML
INJECTION, SOLUTION INTRAVENOUS ONCE
Status: CANCELLED | OUTPATIENT
Start: 2020-01-08 | End: 2020-01-08

## 2020-01-08 RX ADMIN — FLUTICASONE FUROATE AND VILANTEROL TRIFENATATE 1 PUFF: 100; 25 POWDER RESPIRATORY (INHALATION) at 08:01

## 2020-01-08 RX ADMIN — WARFARIN SODIUM 5 MG: 5 TABLET ORAL at 04:01

## 2020-01-08 RX ADMIN — METOPROLOL SUCCINATE 50 MG: 50 TABLET, FILM COATED, EXTENDED RELEASE ORAL at 04:01

## 2020-01-08 RX ADMIN — LOSARTAN POTASSIUM 25 MG: 25 TABLET, FILM COATED ORAL at 04:01

## 2020-01-08 RX ADMIN — ISOSORBIDE MONONITRATE 30 MG: 30 TABLET, EXTENDED RELEASE ORAL at 04:01

## 2020-01-08 RX ADMIN — SODIUM THIOSULFATE 25 G: 250 INJECTION, SOLUTION INTRAVENOUS at 09:01

## 2020-01-08 RX ADMIN — ASPIRIN 81 MG 81 MG: 81 TABLET ORAL at 04:01

## 2020-01-08 RX ADMIN — MAGNESIUM OXIDE 400 MG: 400 TABLET ORAL at 04:01

## 2020-01-08 RX ADMIN — DILTIAZEM HYDROCHLORIDE 180 MG: 180 CAPSULE, COATED, EXTENDED RELEASE ORAL at 04:01

## 2020-01-08 RX ADMIN — TRAMADOL HYDROCHLORIDE 50 MG: 50 TABLET, FILM COATED ORAL at 09:01

## 2020-01-08 RX ADMIN — ENOXAPARIN SODIUM 50 MG: 100 INJECTION SUBCUTANEOUS at 04:01

## 2020-01-08 RX ADMIN — TRAMADOL HYDROCHLORIDE 50 MG: 50 TABLET, FILM COATED ORAL at 12:01

## 2020-01-08 NOTE — PLAN OF CARE
01/07/20 2005   PRE-TX-O2   O2 Device (Oxygen Therapy) nasal cannula   Flow (L/min) 2   SpO2 100 %   Pulse 90   Resp 17   Wound Care   $ Wound Care Tech Time 15 min   Area of Concern Bridge of nose;Chin   Skin Color/Characteristics without discoloration   Skin Temperature warm   Preset CPAP/BiPAP Settings   Mode Of Delivery BiPAP;Standby   $ CPAP/BiPAP Daily Charge BiPAP/CPAP Daily   $ Initial CPAP/BiPAP Setup? No   $ Is patient using? No/refused   Respiratory Evaluation   $ Care Plan Tech Time 15 min   Evaluation For Re-Eval 3 day   Admitting Diagnosis sob   Cardiac Diagnosis a fib

## 2020-01-08 NOTE — PLAN OF CARE
01/08/20 1620   Discharge Assessment   Assessment Type Discharge Planning Reassessment     Spoke with patient about Freedom of Choice Form for HH, explained to patient and family that they have the right to choose any agency, and a list of agencies was provided to patient and family to review, they verbalized an understanding, pt and family want to resume with [facility] , pt's sister signed form, and form scanned into CM notes.    Built asnd sent referral to [facility]  at fax [phone] and called office and left a message with receptonist for intake at [phone] that referral sent and pt to dc this evening, and will need INR labs. Cm will follow for any other dc planning needs.

## 2020-01-08 NOTE — DISCHARGE SUMMARY
"[facility] Medicine  Discharge Summary      Patient Name: Griselda Neely  MRN: [id number]  Admission Date: 1/2/2020  Hospital Length of Stay: 2 days  Discharge Date and Time:  01/08/2020 5:11 PM  Attending Physician: Homer Carlson MD   Discharging Provider: Homer Carlson MD  Primary Care Provider: Kalie Neely MD      HPI:   Patient presented to ed with change in mental status after receiving nitrous oxide at her wound debridement appointment  family waited for changes to resolve but it did not  She has a similar presentation 12/20 with encephalopathy secondary to narcotics  In addition she was not wearing her oxygen and is normally on 3L at home, and had osat 89% in ER with cxr finding of right sided effusion  She has been on the "holiday dialysis schedule"   Per patient/family:  Patient states "michell doesn't know what shes talking about" when asked about confusion  And states she feels fine and would like to go home  She does admit to difficulty breathing feeling sob, denied chest pressure  No nausea or vomiting, er staff stated the patient had diarrhea  Patient reports having cough productive of white sputum  No fever or chills      Procedure(s) (LRB):  Fistulogram with Possible Intervention (Right)  PTA, AV FISTULA (N/A)  INSERTION, STENT, VESSEL, PERIPHERAL (N/A)      Hospital Course:   Patient is a 74-year-old  female with multiple medical comorbidities including ESRD on hemodialysis, paroxysmal atrial fibrillation not on any anticoagulation, chronic respiratory failure on home oxygen presented to the ED with chief complaint of altered mental status.  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  On admission patient was noted to have elevated lactic acid which has resolved with antibiotic use which have since been discontinued.  She was noted to have left atrial filling " defect on CT abdomen concerning for possible left atrial thrombus.  This was confirmed with transthoracic as well as transesophageal echocardiogram.  She has been seen by Cardiology as well as Nephrology for her dialysis needs.  She has been initiated on anticoagulation with warfarin with enoxaparin bridge.  Patient and family member were instructed and provided with enoxaparin as well as warfarin teaching.  Hospital stay was also complicated by delayed post dialysis bleeding from her AV fistula site.  Seen by vascular surgery and underwent AV fistulogram with placement of basilic vein stent.  Home health has been arranged for INR.  Deemed medically stable to be discharged home.      Instructions provided to follow up with primary care physician as outpatient. Patient verbalized understanding and is aware to contact primary care physician or return to ED if new or worsening symptoms.    Physical exam on the day of discharge:  General: Patient resting comfortably in no acute distress.  Lungs: CTA. Good air entry.  Cor: Regular rate and rhythm. No murmurs. No pedal edema.  Abd: Soft. Nontender. Non-distended.  Neuro: A&O x3. Moving all 4 extremities equally  Ext: No clubbing. No cyanosis.        Consults:   Consults (From admission, onward)        Status Ordering Provider     Inpatient consult to Anesthesiology  Once     Provider:  Raul Fung MD    Acknowledged JORDAN MITCHELL     Inpatient consult to Cardiology  Once     Provider:  Gordon Eason MD    Completed AMAN HIDALGO     Inpatient consult to Nephrology  Once     Provider:  Dario Lopez MD    Completed MARIA DEL ROSARIO MCGEE     Inpatient consult to Nephrology  Once     Provider:  Joseph Ingram MD    Completed DARIO LOPEZ     Inpatient consult to   Once     Provider:  (Not yet assigned)    Acknowledged AMAN HIDALGO     Inpatient consult to Vascular Surgery  Once     Provider:  Joseph Loyola MD    Completed ALLISON  SHANDRA ROMERO          No new Assessment & Plan notes have been filed under this hospital service since the last note was generated.  Service: Hospital Medicine    Final Active Diagnoses:    Diagnosis Date Noted POA    PRINCIPAL PROBLEM:  Left atrial thrombus [I51.3] 01/03/2020 Yes    PVD (peripheral vascular disease) [I73.9] 12/20/2019 Yes     Chronic    Chronic respiratory failure [J96.10] 05/13/2019 Yes     Chronic    Pulmonary hypertension [I27.20] 07/25/2017 Yes     Chronic    Other persistent atrial fibrillation [I48.19] 07/18/2017 Yes    Hyperparathyroidism [E21.3] 07/18/2017 Yes    Calciphylaxis [E83.59] 07/18/2017 Yes     Chronic    ESRD (end stage renal disease) on HD M,W, F [N18.6] 05/11/2016 Yes     Chronic    Tobacco dependence [F17.200] 09/09/2013 Yes     Chronic    HTN (hypertension) [I10] 08/15/2013 Yes     Chronic      Problems Resolved During this Admission:    Diagnosis Date Noted Date Resolved POA    AV fistula stenosis [T82.858A] 01/07/2020 01/08/2020 Yes    Encephalopathy acute [G93.40] 01/07/2020 01/08/2020 Yes    Encephalopathy acute [G93.40] 01/03/2020 01/04/2020 Yes    Loculated pleural effusion [J90] 01/03/2020 01/07/2020 Yes    Seizures [R56.9] 12/20/2019 01/08/2020 Yes     Chronic    Volume overload [E87.70] 10/18/2019 01/07/2020 Yes    Troponin I above reference range [R79.89] 07/25/2017 01/08/2020 Yes       Discharged Condition: fair    Disposition: Home-Health Care Svc    Follow Up:  Follow-up Information     Katharina Powell MD. Schedule an appointment as soon as possible for a visit in 2 weeks.    Specialties:  Cardiovascular Disease, Cardiology  Contact information:  [address] [place] [address]  [address]  [facility] [address]  [phone]                 Patient Instructions:      Protime-INR   Standing Status: Future Standing Exp. Date: 03/08/21     Referral to Home health   Referral Priority: Routine Referral Type: Home Health   Referral Reason: Specialty Services Required    Requested Specialty: Home Health Services   Number of Visits Requested: 1     Diet Cardiac     Diet renal     Notify your health care provider if you experience any of the following:  temperature >100.4     Notify your health care provider if you experience any of the following:  persistent nausea and vomiting or diarrhea     Notify your health care provider if you experience any of the following:  persistent dizziness, light-headedness, or visual disturbances     Activity as tolerated       Significant Diagnostic Studies: Labs:   CMP   Recent Labs   Lab 01/07/20 0444 01/08/20  0340    135*   K 3.8 4.4   CL 94* 95   CO2 26 24   GLU 71 60*   BUN 25* 29*   CREATININE 4.7* 5.5*   CALCIUM 7.7* 7.6*   ANIONGAP 17* 16   ESTGFRAFRICA 9.9* 8.2*   EGFRNONAA 8.6* 7.1*   , CBC   Recent Labs   Lab 01/07/20 0444 01/08/20  0340   WBC 5.07 4.70   HGB 10.0* 9.9*   HCT 32.6* 31.5*    190    and INR   Lab Results   Component Value Date    INR 1.3 01/08/2020    INR 1.1 01/07/2020    INR 1.4 01/02/2020       Pending Diagnostic Studies:     Procedure Component Value Units Date/Time    Transesophageal echo (TRUPTI) [[id number]] Resulted:  01/07/20 0832    Order Status:  Sent Lab Status:  In process Updated:  01/07/20 0834     BSA 1.64 m2          Medications:  Reconciled Home Medications:      Medication List      START taking these medications    enoxaparin 60 mg/0.6 mL Syrg  Commonly known as:  LOVENOX  Inject 0.6 mLs (60 mg total) into the skin once daily. for 5 doses     warfarin 5 MG tablet  Commonly known as:  COUMADIN  Take 1 tablet (5 mg total) by mouth Daily.        CONTINUE taking these medications    aspirin 81 MG Chew  Take 81 mg by mouth once daily.     calcium acetate 667 mg capsule  Commonly known as:  PHOSLO  Take 1,334 mg by mouth 3 (three) times daily with meals.     diltiaZEM 180 MG 24 hr capsule  Commonly known as:  CARDIZEM CD  Take 180 mg by mouth once daily.     fluticasone furoate-vilanterol 100-25  mcg/dose diskus inhaler  Commonly known as:  BREO  Inhale 1 puff into the lungs once daily. Controller     isosorbide mononitrate 30 MG 24 hr tablet  Commonly known as:  IMDUR  Take 30 mg by mouth once daily.     losartan 25 MG tablet  Commonly known as:  COZAAR  Take 25 mg by mouth once daily.     magnesium oxide 400 mg (241.3 mg magnesium) tablet  Commonly known as:  MAG-OX  Take 400 mg by mouth once daily.     metoprolol succinate 50 MG 24 hr tablet  Commonly known as:  TOPROL-XL  Take 1 tablet (50 mg total) by mouth once daily.     ondansetron 4 MG Tbdl  Commonly known as:  ZOFRAN-ODT  Take 1 tablet (4 mg total) by mouth every 6 (six) hours as needed.     oxyCODONE-acetaminophen 5-325 mg per tablet  Commonly known as:  PERCOCET  Take 1 tablet by mouth daily as needed for Pain (with dialysis).     sevelamer HCl 800 MG Tab  Commonly known as:  RENAGEL  Take 1 tablet (800 mg total) by mouth 3 (three) times daily with meals.     traMADol 50 mg tablet  Commonly known as:  ULTRAM  Take 1 tablet (50 mg total) by mouth 3 (three) times a week. 3 TIMES A WEEK AS NEEDED DURING HEMODIALYSIS        STOP taking these medications    levETIRAcetam 500 MG Tab  Commonly known as:  KEPPRA            Indwelling Lines/Drains at time of discharge:   Lines/Drains/Airways     Drain                 Hemodialysis AV Fistula 08/08/19 0214 Right upper arm 153 days          Pressure Ulcer                 Pressure Injury 10/23/19 1300 Right Buttocks Stage 2 77 days                Time spent on the discharge of patient: 40 minutes  Patient was seen and examined on the date of discharge and determined to be suitable for discharge.         Homer Carlson MD  Department of Hospital Medicine  [facility]

## 2020-01-08 NOTE — PLAN OF CARE
01/08/20 0822   Patient Assessment/Suction   Level of Consciousness (AVPU) alert   Respiratory Effort Unlabored;Normal   Expansion/Accessory Muscles/Retractions no use of accessory muscles;no retractions;expansion symmetric   All Lung Fields Breath Sounds clear   Rhythm/Pattern, Respiratory unlabored;pattern regular;depth regular   Cough Frequency infrequent   Cough Type good   PRE-TX-O2   O2 Device (Oxygen Therapy) nasal cannula   $ Is the patient on Low Flow Oxygen? Yes   Flow (L/min) 2   SpO2 99 %   Pulse Oximetry Type Intermittent   $ Pulse Oximetry - Multiple Charge Pulse Oximetry - Multiple   Pulse (!) 55   Resp 17   Inhaler   $ Inhaler Charges MDI (Metered Dose Inahler) Treatment;Mouth rinsed post treatment   Daily Review of Necessity (Inhaler) completed   Respiratory Treatment Status (Inhaler) given   Treatment Route (Inhaler) mouthpiece   Patient Position (Inhaler) semi-Clark's   Post Treatment Assessment (Inhaler) increased aeration   Signs of Intolerance (Inhaler) none   Breath Sounds Post-Respiratory Treatment   Throughout All Fields Post-Treatment All Fields   Throughout All Fields Post-Treatment aeration increased   Post-treatment Heart Rate (beats/min) 56   Post-treatment Resp Rate (breaths/min) 18

## 2020-01-08 NOTE — PROGRESS NOTES
Pharmacist Renal Dose Adjustment Note    Grisleda Neely is a 74 y.o. female being treated with the medication Enoxaparin 60 mg    Patient Data:    Vital Signs (Most Recent):  Temp: 97.4 °F (36.3 °C) (01/08/20 1500)  Pulse: 80 (01/08/20 1500)  Resp: 18 (01/08/20 1500)  BP: 129/82 (01/08/20 1500)  SpO2: 99 % (01/08/20 1500) Vital Signs (72h Range):  Temp:  [97 °F (36.1 °C)-98.7 °F (37.1 °C)]   Pulse:  []   Resp:  [16-32]   BP: ()/(48-93)   SpO2:  [85 %-100 %]      Recent Labs   Lab 01/06/20  0521 01/07/20  0444 01/08/20  0340   CREATININE 6.4* 4.7* 5.5*     Serum creatinine: 5.5 mg/dL (H) 01/08/20 0340  Estimated creatinine clearance: 7.7 mL/min (A)    OLD Weight  59 KG    NEW Weight  53.4 KG      Medication:  Enoxaparin dose: 60 mg frequency q24h will be changed to medication: Enoxaparin dose:50 mg frequency: Q24H.   Because of a weight change    Pharmacist's Name: Carlos Robertson  Pharmacist's Extension:   [phone]

## 2020-01-08 NOTE — PROGRESS NOTES
Former[facility]  Cardiology  Progress Note    Patient Name: Griselda Neely  MRN: [id number]  Admission Date: 1/2/2020  Hospital Length of Stay: 2 days  Code Status: Full Code   Attending Physician: Homer Carlson MD   Primary Care Physician: Kalie Neely MD  Expected Discharge Date:   Principal Problem:Left atrial thrombus    Subjective:       Interval History: Denies any chest pain or shortness of breath.     ROS   Denies any focal weakness.   Denies any abdominal pain.   Denies any bleeding issues.     Objective:     Vital Signs (Most Recent):  Temp: 97.4 °F (36.3 °C) (01/08/20 0816)  Pulse: (!) 55 (01/08/20 0822)  Resp: 17 (01/08/20 0822)  BP: (!) 142/79 (01/08/20 0816)  SpO2: 99 % (01/08/20 0822) Vital Signs (24h Range):  Temp:  [97.4 °F (36.3 °C)-98.7 °F (37.1 °C)] 97.4 °F (36.3 °C)  Pulse:  [] 55  Resp:  [16-19] 17  SpO2:  [95 %-100 %] 99 %  BP: (111-143)/(64-90) 142/79     Weight: 53.4 kg (117 lb 10.2 oz)  Body mass index is 19.58 kg/m².    SpO2: 99 %  O2 Device (Oxygen Therapy): nasal cannula    No intake or output data in the 24 hours ending 01/08/20 1007    Lines/Drains/Airways     Drain                 Hemodialysis AV Fistula 08/08/19 0214 Right upper arm 153 days          Peripheral Intravenous Line                 Peripheral IV - Single Lumen 01/07/20 0838 20 G Left Hand 1 day                Scheduled Meds:   aspirin  81 mg Oral Daily    calcium acetate  1,334 mg Oral TID WM    cholestyramine-aspartame  1 packet Oral BID    diltiaZEM  180 mg Oral Daily    fluticasone furoate-vilanterol  1 puff Inhalation Daily    isosorbide mononitrate  30 mg Oral Daily    losartan  25 mg Oral Daily    magnesium oxide  400 mg Oral Daily    metoprolol succinate  50 mg Oral Daily    mupirocin   Nasal BID    sevelamer HCl  800 mg Oral TID WM    sodium thiosulfate  25 g Intravenous Every Mon, Wed, Fri    warfarin  5 mg Oral Daily     Continuous Infusions:  PRN Meds:.acetaminophen,  dextrose 50%, dextrose 50%, glucagon (human recombinant), glucose, glucose, hydrOXYzine pamoate, loperamide, ondansetron, sodium chloride 0.9%, traMADol     Physical Exam  HEENT: Normocephalic, atraumatic, PERRL, Conjunctiva pink, no scleral icterus.   CVS: S1S2+, Irregular, SM+, no rubs or gallops, JVP: Normal.  LUNGS: No wheezes or crackles.   ABDOMEN: Soft, NT, BS+  EXTREMITIES: No cyanosis, clubbing or edema  NEURO: AAO X 3.       Significant Labs:   Blood Culture: No results for input(s): LABBLOO in the last 48 hours., BMP:   Recent Labs   Lab 01/07/20  0444 01/08/20  0340   GLU 71 60*    135*   K 3.8 4.4   CL 94* 95   CO2 26 24   BUN 25* 29*   CREATININE 4.7* 5.5*   CALCIUM 7.7* 7.6*   MG 2.1 2.2   , CMP   Recent Labs   Lab 01/07/20  0444 01/08/20  0340    135*   K 3.8 4.4   CL 94* 95   CO2 26 24   GLU 71 60*   BUN 25* 29*   CREATININE 4.7* 5.5*   CALCIUM 7.7* 7.6*   ANIONGAP 17* 16   ESTGFRAFRICA 9.9* 8.2*   EGFRNONAA 8.6* 7.1*   , CBC   Recent Labs   Lab 01/07/20  0444 01/08/20  0340   WBC 5.07 4.70   HGB 10.0* 9.9*   HCT 32.6* 31.5*    190   , INR   Recent Labs   Lab 01/07/20  0548   INR 1.1   , Lipid Panel No results for input(s): CHOL, HDL, LDLCALC, TRIG, CHOLHDL in the last 48 hours. and Troponin No results for input(s): TROPONINI in the last 48 hours.    Significant Imaging: Reviewed.   Assessment and Plan:     IMPRESSION:  1. Large left atrial mass. Likely thrombus. TRUPTI with large thrombus in the roof of the left atrium as well as in the YOUNG. CT Abdomen with left atrial filling defect. Patient agreeable to coumadin.lovenox now.   2. Congestive heart failure. Chronic. LVEF ~25% on last echo. Volume overload on exam.   3. COPD.  4. Calciphlyaxis. Reportedly better.   5. Atrial fibrillation. Valvular. Persistent.   6. Hypertension. Better controlled at present. Elevates with agitation. Periods of hypotension. Seems better.   7. s/p MVR via right thoracotomy approach on 3/18/19 with   Guido. Functioning appropriately per last echo.   8. CAD. Nonobstructive on LHC done on 3/13/2019.   9. End-stage renal disease on hemodialysis MWF. Anuric. Followed by Dr. Ingram.   10. Chronic tobacco abuse.      PLAN:  1. Patient wants to go home. I had a detailed discussion with the patient. She is agreeable to take Lovenox and coumadin. She along with her sister need to be trained about administration of lovenox.   2. She needs to have INR done tomorrow and report to us.   3. Discussed with Dr Carlson.     Katharina Powell MD  Cardiology  [facility]

## 2020-01-09 ENCOUNTER — LAB VISIT (OUTPATIENT)
Dept: LAB | Facility: HOSPITAL | Age: 75
End: 2020-01-09
Attending: INTERNAL MEDICINE
Payer: MEDICARE

## 2020-01-09 ENCOUNTER — TELEPHONE (OUTPATIENT)
Dept: FAMILY MEDICINE | Facility: CLINIC | Age: 75
End: 2020-01-09

## 2020-01-09 DIAGNOSIS — I48.0 PAROXYSMAL ATRIAL FIBRILLATION: Primary | ICD-10-CM

## 2020-01-09 DIAGNOSIS — Z79.01 LONG TERM (CURRENT) USE OF ANTICOAGULANTS: ICD-10-CM

## 2020-01-09 LAB
INR PPP: 2.2
PROTHROMBIN TIME: 23.5 SEC (ref 10.6–14.8)

## 2020-01-09 PROCEDURE — 36415 COLL VENOUS BLD VENIPUNCTURE: CPT

## 2020-01-09 PROCEDURE — 85610 PROTHROMBIN TIME: CPT

## 2020-01-10 ENCOUNTER — TELEPHONE (OUTPATIENT)
Dept: FAMILY MEDICINE | Facility: CLINIC | Age: 75
End: 2020-01-10

## 2020-01-10 LAB — O+P STL TRI STN: NORMAL

## 2020-01-15 ENCOUNTER — TELEPHONE (OUTPATIENT)
Dept: FAMILY MEDICINE | Facility: CLINIC | Age: 75
End: 2020-01-15

## 2020-01-15 DIAGNOSIS — I50.32 CHRONIC DIASTOLIC HEART FAILURE: ICD-10-CM

## 2020-01-15 DIAGNOSIS — J96.11 CHRONIC RESPIRATORY FAILURE WITH HYPOXIA: Primary | Chronic | ICD-10-CM

## 2020-01-15 NOTE — TELEPHONE ENCOUNTER
----- Message from Susy Porter sent at 1/14/2020  5:14 PM CST -----  Contact: Edie Willoughby from [employer] [facility]  The patient needs an order for a bed side commode. She needs  M N F  And the order fqaxed to [facility] ins. Send to Mary Irizarry's # [phone] her fax  # [phone]

## 2020-01-24 ENCOUNTER — EXTERNAL HOME HEALTH (OUTPATIENT)
Dept: HOME HEALTH SERVICES | Facility: HOSPITAL | Age: 75
End: 2020-01-24

## 2020-02-02 ENCOUNTER — HOSPITAL ENCOUNTER (OUTPATIENT)
Facility: HOSPITAL | Age: 75
Discharge: HOME | End: 2020-02-05
Attending: EMERGENCY MEDICINE | Admitting: INTERNAL MEDICINE
Payer: MEDICARE

## 2020-02-02 DIAGNOSIS — R07.9 CHEST PAIN: ICD-10-CM

## 2020-02-02 DIAGNOSIS — I48.91 ATRIAL FIBRILLATION WITH RVR: Primary | ICD-10-CM

## 2020-02-02 DIAGNOSIS — R06.02 SOB (SHORTNESS OF BREATH): ICD-10-CM

## 2020-02-02 DIAGNOSIS — N18.6 END STAGE RENAL DISEASE: ICD-10-CM

## 2020-02-02 DIAGNOSIS — I48.91 ATRIAL FIBRILLATION WITH RAPID VENTRICULAR RESPONSE: ICD-10-CM

## 2020-02-02 DIAGNOSIS — J44.1 COPD EXACERBATION: ICD-10-CM

## 2020-02-02 LAB
ALBUMIN SERPL BCP-MCNC: 2.9 G/DL (ref 3.5–5.2)
ALP SERPL-CCNC: 102 U/L (ref 55–135)
ALT SERPL W/O P-5'-P-CCNC: 12 U/L (ref 10–44)
ANION GAP SERPL CALC-SCNC: 15 MMOL/L (ref 8–16)
AST SERPL-CCNC: 28 U/L (ref 10–40)
BASOPHILS # BLD AUTO: 0.01 K/UL (ref 0–0.2)
BASOPHILS NFR BLD: 0.2 % (ref 0–1.9)
BILIRUB SERPL-MCNC: 0.9 MG/DL (ref 0.1–1)
BNP SERPL-MCNC: >4500 PG/ML (ref 0–99)
BUN SERPL-MCNC: 34 MG/DL (ref 8–23)
CALCIUM SERPL-MCNC: 7.7 MG/DL (ref 8.7–10.5)
CHLORIDE SERPL-SCNC: 94 MMOL/L (ref 95–110)
CO2 SERPL-SCNC: 31 MMOL/L (ref 23–29)
CREAT SERPL-MCNC: 6.1 MG/DL (ref 0.5–1.4)
DIFFERENTIAL METHOD: ABNORMAL
EOSINOPHIL # BLD AUTO: 0 K/UL (ref 0–0.5)
EOSINOPHIL NFR BLD: 0.7 % (ref 0–8)
ERYTHROCYTE [DISTWIDTH] IN BLOOD BY AUTOMATED COUNT: 18.4 % (ref 11.5–14.5)
EST. GFR  (AFRICAN AMERICAN): 7.3 ML/MIN/1.73 M^2
EST. GFR  (NON AFRICAN AMERICAN): 6.3 ML/MIN/1.73 M^2
GLUCOSE SERPL-MCNC: 131 MG/DL (ref 70–110)
GLUCOSE SERPL-MCNC: 81 MG/DL (ref 70–110)
HCT VFR BLD AUTO: 34.2 % (ref 37–48.5)
HGB BLD-MCNC: 10.3 G/DL (ref 12–16)
IMM GRANULOCYTES # BLD AUTO: 0.02 K/UL (ref 0–0.04)
IMM GRANULOCYTES NFR BLD AUTO: 0.4 % (ref 0–0.5)
INR PPP: 3.6
LACTATE SERPL-SCNC: 1.6 MMOL/L (ref 0.5–1.9)
LACTATE SERPL-SCNC: 1.9 MMOL/L (ref 0.5–1.9)
LYMPHOCYTES # BLD AUTO: 1 K/UL (ref 1–4.8)
LYMPHOCYTES NFR BLD: 17.1 % (ref 18–48)
MAGNESIUM SERPL-MCNC: 1.8 MG/DL (ref 1.6–2.6)
MCH RBC QN AUTO: 27.7 PG (ref 27–31)
MCHC RBC AUTO-ENTMCNC: 30.1 G/DL (ref 32–36)
MCV RBC AUTO: 92 FL (ref 82–98)
MONOCYTES # BLD AUTO: 0.7 K/UL (ref 0.3–1)
MONOCYTES NFR BLD: 12.2 % (ref 4–15)
NEUTROPHILS # BLD AUTO: 3.9 K/UL (ref 1.8–7.7)
NEUTROPHILS NFR BLD: 69.4 % (ref 38–73)
NRBC BLD-RTO: 0 /100 WBC
PLATELET # BLD AUTO: 347 K/UL (ref 150–350)
PMV BLD AUTO: 10.7 FL (ref 9.2–12.9)
POTASSIUM SERPL-SCNC: 4.1 MMOL/L (ref 3.5–5.1)
PROT SERPL-MCNC: 7.8 G/DL (ref 6–8.4)
PROTHROMBIN TIME: 34.6 SEC (ref 10.6–14.8)
RBC # BLD AUTO: 3.72 M/UL (ref 4–5.4)
SODIUM SERPL-SCNC: 140 MMOL/L (ref 136–145)
TROPONIN I SERPL DL<=0.01 NG/ML-MCNC: 0.17 NG/ML
TROPONIN I SERPL DL<=0.01 NG/ML-MCNC: 0.19 NG/ML
WBC # BLD AUTO: 5.57 K/UL (ref 3.9–12.7)

## 2020-02-02 PROCEDURE — 99900035 HC TECH TIME PER 15 MIN (STAT)

## 2020-02-02 PROCEDURE — 80053 COMPREHEN METABOLIC PANEL: CPT

## 2020-02-02 PROCEDURE — 25000003 PHARM REV CODE 250: Performed by: EMERGENCY MEDICINE

## 2020-02-02 PROCEDURE — G0378 HOSPITAL OBSERVATION PER HR: HCPCS

## 2020-02-02 PROCEDURE — 84484 ASSAY OF TROPONIN QUANT: CPT

## 2020-02-02 PROCEDURE — 93005 ELECTROCARDIOGRAM TRACING: CPT

## 2020-02-02 PROCEDURE — 25000242 PHARM REV CODE 250 ALT 637 W/ HCPCS: Performed by: EMERGENCY MEDICINE

## 2020-02-02 PROCEDURE — 63600175 PHARM REV CODE 636 W HCPCS: Performed by: EMERGENCY MEDICINE

## 2020-02-02 PROCEDURE — 94761 N-INVAS EAR/PLS OXIMETRY MLT: CPT

## 2020-02-02 PROCEDURE — 90935 HEMODIALYSIS ONE EVALUATION: CPT

## 2020-02-02 PROCEDURE — 25000003 PHARM REV CODE 250: Performed by: NURSE PRACTITIONER

## 2020-02-02 PROCEDURE — 83605 ASSAY OF LACTIC ACID: CPT | Mod: 91

## 2020-02-02 PROCEDURE — 94644 CONT INHLJ TX 1ST HOUR: CPT

## 2020-02-02 PROCEDURE — 96366 THER/PROPH/DIAG IV INF ADDON: CPT

## 2020-02-02 PROCEDURE — 83880 ASSAY OF NATRIURETIC PEPTIDE: CPT

## 2020-02-02 PROCEDURE — 94660 CPAP INITIATION&MGMT: CPT

## 2020-02-02 PROCEDURE — 96366 THER/PROPH/DIAG IV INF ADDON: CPT | Mod: 59

## 2020-02-02 PROCEDURE — 85610 PROTHROMBIN TIME: CPT

## 2020-02-02 PROCEDURE — 96376 TX/PRO/DX INJ SAME DRUG ADON: CPT | Mod: 59

## 2020-02-02 PROCEDURE — 36415 COLL VENOUS BLD VENIPUNCTURE: CPT

## 2020-02-02 PROCEDURE — 96365 THER/PROPH/DIAG IV INF INIT: CPT | Mod: 59

## 2020-02-02 PROCEDURE — 87641 MR-STAPH DNA AMP PROBE: CPT

## 2020-02-02 PROCEDURE — 87040 BLOOD CULTURE FOR BACTERIA: CPT

## 2020-02-02 PROCEDURE — 87340 HEPATITIS B SURFACE AG IA: CPT

## 2020-02-02 PROCEDURE — 96375 TX/PRO/DX INJ NEW DRUG ADDON: CPT | Mod: 59

## 2020-02-02 PROCEDURE — 96368 THER/DIAG CONCURRENT INF: CPT

## 2020-02-02 PROCEDURE — 94640 AIRWAY INHALATION TREATMENT: CPT

## 2020-02-02 PROCEDURE — 83735 ASSAY OF MAGNESIUM: CPT

## 2020-02-02 PROCEDURE — 99291 CRITICAL CARE FIRST HOUR: CPT

## 2020-02-02 PROCEDURE — 25000003 PHARM REV CODE 250: Performed by: INTERNAL MEDICINE

## 2020-02-02 PROCEDURE — 85025 COMPLETE CBC W/AUTO DIFF WBC: CPT

## 2020-02-02 PROCEDURE — 27000221 HC OXYGEN, UP TO 24 HOURS

## 2020-02-02 RX ORDER — LEVALBUTEROL 1.25 MG/.5ML
1.25 SOLUTION, CONCENTRATE RESPIRATORY (INHALATION)
Status: ACTIVE | OUTPATIENT
Start: 2020-02-02 | End: 2020-02-02

## 2020-02-02 RX ORDER — SODIUM CHLORIDE 9 MG/ML
INJECTION, SOLUTION INTRAVENOUS
Status: DISCONTINUED | OUTPATIENT
Start: 2020-02-02 | End: 2020-02-05 | Stop reason: HOSPADM

## 2020-02-02 RX ORDER — WARFARIN SODIUM 5 MG/1
5 TABLET ORAL DAILY
Status: DISCONTINUED | OUTPATIENT
Start: 2020-02-02 | End: 2020-02-03

## 2020-02-02 RX ORDER — IPRATROPIUM BROMIDE 0.5 MG/2.5ML
1 SOLUTION RESPIRATORY (INHALATION)
Status: DISCONTINUED | OUTPATIENT
Start: 2020-02-02 | End: 2020-02-02

## 2020-02-02 RX ORDER — LORAZEPAM 0.5 MG/1
0.5 TABLET ORAL ONCE
Status: COMPLETED | OUTPATIENT
Start: 2020-02-02 | End: 2020-02-02

## 2020-02-02 RX ORDER — ISOSORBIDE MONONITRATE 30 MG/1
30 TABLET, EXTENDED RELEASE ORAL DAILY
Status: DISCONTINUED | OUTPATIENT
Start: 2020-02-03 | End: 2020-02-03

## 2020-02-02 RX ORDER — ONDANSETRON 4 MG/1
4 TABLET, ORALLY DISINTEGRATING ORAL EVERY 6 HOURS PRN
Status: DISCONTINUED | OUTPATIENT
Start: 2020-02-02 | End: 2020-02-05 | Stop reason: HOSPADM

## 2020-02-02 RX ORDER — METHYLPREDNISOLONE SOD SUCC 125 MG
125 VIAL (EA) INJECTION
Status: COMPLETED | OUTPATIENT
Start: 2020-02-02 | End: 2020-02-02

## 2020-02-02 RX ORDER — SODIUM CHLORIDE 9 MG/ML
INJECTION, SOLUTION INTRAVENOUS ONCE
Status: DISCONTINUED | OUTPATIENT
Start: 2020-02-02 | End: 2020-02-05 | Stop reason: HOSPADM

## 2020-02-02 RX ORDER — LEVALBUTEROL INHALATION SOLUTION 1.25 MG/3ML
1.25 SOLUTION RESPIRATORY (INHALATION)
Status: DISCONTINUED | OUTPATIENT
Start: 2020-02-02 | End: 2020-02-02

## 2020-02-02 RX ORDER — SODIUM CHLORIDE 9 MG/ML
INJECTION, SOLUTION INTRAVENOUS
Status: DISCONTINUED | OUTPATIENT
Start: 2020-02-02 | End: 2020-02-02

## 2020-02-02 RX ORDER — TRAMADOL HYDROCHLORIDE 50 MG/1
50 TABLET ORAL ONCE
Status: COMPLETED | OUTPATIENT
Start: 2020-02-03 | End: 2020-02-02

## 2020-02-02 RX ORDER — LOSARTAN POTASSIUM 25 MG/1
25 TABLET ORAL DAILY
Status: DISCONTINUED | OUTPATIENT
Start: 2020-02-02 | End: 2020-02-03

## 2020-02-02 RX ORDER — DILTIAZEM HCL 1 MG/ML
5 INJECTION, SOLUTION INTRAVENOUS CONTINUOUS
Status: DISCONTINUED | OUTPATIENT
Start: 2020-02-02 | End: 2020-02-03

## 2020-02-02 RX ORDER — METOPROLOL SUCCINATE 50 MG/1
50 TABLET, EXTENDED RELEASE ORAL DAILY
Status: DISCONTINUED | OUTPATIENT
Start: 2020-02-03 | End: 2020-02-05 | Stop reason: HOSPADM

## 2020-02-02 RX ORDER — SODIUM CHLORIDE 0.9 % (FLUSH) 0.9 %
10 SYRINGE (ML) INJECTION
Status: DISCONTINUED | OUTPATIENT
Start: 2020-02-02 | End: 2020-02-05 | Stop reason: HOSPADM

## 2020-02-02 RX ORDER — SODIUM CHLORIDE 9 MG/ML
INJECTION, SOLUTION INTRAVENOUS ONCE
Status: DISCONTINUED | OUTPATIENT
Start: 2020-02-02 | End: 2020-02-02

## 2020-02-02 RX ORDER — ASPIRIN 325 MG
325 TABLET ORAL
Status: COMPLETED | OUTPATIENT
Start: 2020-02-02 | End: 2020-02-02

## 2020-02-02 RX ORDER — DILTIAZEM HYDROCHLORIDE 180 MG/1
180 CAPSULE, COATED, EXTENDED RELEASE ORAL DAILY
Status: DISCONTINUED | OUTPATIENT
Start: 2020-02-02 | End: 2020-02-05 | Stop reason: HOSPADM

## 2020-02-02 RX ORDER — HYDROCODONE BITARTRATE AND ACETAMINOPHEN 7.5; 325 MG/1; MG/1
1 TABLET ORAL EVERY 6 HOURS PRN
Status: ON HOLD | COMMUNITY
End: 2020-03-19 | Stop reason: HOSPADM

## 2020-02-02 RX ORDER — LANOLIN ALCOHOL/MO/W.PET/CERES
400 CREAM (GRAM) TOPICAL DAILY
Status: DISCONTINUED | OUTPATIENT
Start: 2020-02-03 | End: 2020-02-05 | Stop reason: HOSPADM

## 2020-02-02 RX ORDER — LEVALBUTEROL 1.25 MG/.5ML
1.25 SOLUTION, CONCENTRATE RESPIRATORY (INHALATION)
Status: COMPLETED | OUTPATIENT
Start: 2020-02-02 | End: 2020-02-02

## 2020-02-02 RX ORDER — IPRATROPIUM BROMIDE AND ALBUTEROL SULFATE 2.5; .5 MG/3ML; MG/3ML
3 SOLUTION RESPIRATORY (INHALATION)
Status: DISCONTINUED | OUTPATIENT
Start: 2020-02-02 | End: 2020-02-05 | Stop reason: HOSPADM

## 2020-02-02 RX ORDER — MUPIROCIN 20 MG/G
OINTMENT TOPICAL 2 TIMES DAILY
Status: DISCONTINUED | OUTPATIENT
Start: 2020-02-02 | End: 2020-02-05 | Stop reason: HOSPADM

## 2020-02-02 RX ORDER — LEVOFLOXACIN 5 MG/ML
750 INJECTION, SOLUTION INTRAVENOUS
Status: COMPLETED | OUTPATIENT
Start: 2020-02-02 | End: 2020-02-02

## 2020-02-02 RX ORDER — DILTIAZEM HYDROCHLORIDE 5 MG/ML
10 INJECTION INTRAVENOUS
Status: COMPLETED | OUTPATIENT
Start: 2020-02-02 | End: 2020-02-02

## 2020-02-02 RX ORDER — TRAMADOL HYDROCHLORIDE 50 MG/1
50 TABLET ORAL ONCE
Status: DISCONTINUED | OUTPATIENT
Start: 2020-02-03 | End: 2020-02-02

## 2020-02-02 RX ORDER — HYDROCODONE BITARTRATE AND ACETAMINOPHEN 7.5; 325 MG/1; MG/1
1 TABLET ORAL EVERY 6 HOURS PRN
Status: DISCONTINUED | OUTPATIENT
Start: 2020-02-02 | End: 2020-02-03

## 2020-02-02 RX ADMIN — TRAMADOL HYDROCHLORIDE 50 MG: 50 TABLET, FILM COATED ORAL at 11:02

## 2020-02-02 RX ADMIN — DILTIAZEM HYDROCHLORIDE 180 MG: 180 CAPSULE, COATED, EXTENDED RELEASE ORAL at 09:02

## 2020-02-02 RX ADMIN — LEVOFLOXACIN 750 MG: 750 INJECTION, SOLUTION INTRAVENOUS at 05:02

## 2020-02-02 RX ADMIN — IPRATROPIUM BROMIDE 1 MG: 0.5 SOLUTION RESPIRATORY (INHALATION) at 07:02

## 2020-02-02 RX ADMIN — ASPIRIN 325 MG ORAL TABLET 325 MG: 325 PILL ORAL at 03:02

## 2020-02-02 RX ADMIN — LORAZEPAM 0.5 MG: 0.5 TABLET ORAL at 11:02

## 2020-02-02 RX ADMIN — DILTIAZEM HYDROCHLORIDE 10 MG: 5 INJECTION INTRAVENOUS at 03:02

## 2020-02-02 RX ADMIN — NITROGLYCERIN 1 INCH: 20 OINTMENT TOPICAL at 03:02

## 2020-02-02 RX ADMIN — LEVALBUTEROL HYDROCHLORIDE 1.25 MG: 1.25 SOLUTION, CONCENTRATE RESPIRATORY (INHALATION) at 05:02

## 2020-02-02 RX ADMIN — LEVALBUTEROL HYDROCHLORIDE 1.25 MG: 1.25 SOLUTION, CONCENTRATE RESPIRATORY (INHALATION) at 07:02

## 2020-02-02 RX ADMIN — METHYLPREDNISOLONE SODIUM SUCCINATE 125 MG: 125 INJECTION, POWDER, FOR SOLUTION INTRAMUSCULAR; INTRAVENOUS at 05:02

## 2020-02-02 RX ADMIN — DILTIAZEM HYDROCHLORIDE 5 MG/HR: 5 INJECTION INTRAVENOUS at 04:02

## 2020-02-02 RX ADMIN — SODIUM CHLORIDE 100 ML: 9 INJECTION, SOLUTION INTRAVENOUS at 05:02

## 2020-02-02 RX ADMIN — HYDROCODONE BITARTRATE AND ACETAMINOPHEN 1 TABLET: 7.5; 325 TABLET ORAL at 09:02

## 2020-02-02 RX ADMIN — MUPIROCIN: 20 OINTMENT TOPICAL at 11:02

## 2020-02-02 RX ADMIN — LOSARTAN POTASSIUM 25 MG: 25 TABLET, FILM COATED ORAL at 09:02

## 2020-02-02 NOTE — ED PROVIDER NOTES
Encounter Date: 2/2/2020       History     Chief Complaint   Patient presents with    Chest Pain     74-year-old female presented emergency department with substernal chest tightness which started this morning while she was at [employer].  Patient also has history of atrial fibrillation and started having palpitations and was getting short of breath. Patient also coughing and wheezing and short of breath. Patient denies dysuria or hematuria.  Denies fever or chills.  Pain denies nausea or vomiting.        Review of patient's allergies indicates:  No Known Allergies  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    both legs    CHF (congestive heart failure)     Encounter for blood transfusion 03/2016    Gout     Hypertension     Mitral valve regurgitation     Osteoarthritis     Pancreatitis     Peripheral vascular disease     Peritoneal dialysis catheter in place     Pneumonia 09/09/2017    Renal failure      Past Surgical History:   Procedure Laterality Date    ACHILLES TENDON SURGERY Right     ANGIOGRAPHY OF ARTERIOVENOUS SHUNT Right 1/7/2020    Procedure: Fistulogram with Possible Intervention;  Surgeon: Joseph Loyola MD;  Location: [facility] CATH/EP LAB;  Service: Cardiology;  Laterality: Right;    APPENDECTOMY      CARDIAC CATHETERIZATION  07/03/2017    CHOLECYSTECTOMY      heal surgery Right     HYSTERECTOMY      INSERTION OF STENT INTO PERIPHERAL VESSEL N/A 1/7/2020    Procedure: INSERTION, STENT, VESSEL, PERIPHERAL;  Surgeon: Joseph Loyola MD;  Location: [facility] CATH/EP LAB;  Service: Cardiology;  Laterality: N/A;    PARATHYROIDECTOMY      PARATHYROIDECTOMY  07/13/2017    PERCUTANEOUS TRANSLUMINAL ANGIOPLASTY OF ARTERIOVENOUS FISTULA N/A 1/7/2020    Procedure: PTA, AV FISTULA;  Surgeon: Joseph Loyola MD;  Location: [facility] CATH/EP LAB;  Service: Cardiology;  Laterality: N/A;    PERITONEAL CATHETER INSERTION      RENAL BIOPSY      vocal cord nodule        Family History   Problem Relation Age of Onset    Heart disease Sister     Early death Sister         heart as baby    Heart disease Maternal Grandfather     Diabetes Mother     Hypertension Mother     Heart failure Mother     Heart disease Mother     Arthritis Mother     Diabetes Father     Early death Sister         infant     Social History     Tobacco Use    Smoking status: Current Some Day Smoker     Packs/day: 0.50     Years: 45.00     Pack years: 22.50    Smokeless tobacco: Never Used   Substance Use Topics    Alcohol use: No    Drug use: No     Review of Systems   Constitutional: Negative.    HENT: Negative.    Eyes: Negative.    Respiratory: Positive for cough, shortness of breath and wheezing.    Cardiovascular: Positive for chest pain, palpitations and leg swelling.   Gastrointestinal: Negative.    Endocrine: Negative.    Genitourinary: Negative.    Musculoskeletal: Negative.    Skin: Negative.    Allergic/Immunologic: Negative.    Neurological: Negative.    Hematological: Negative.    Psychiatric/Behavioral: Negative.    All other systems reviewed and are negative.      Physical Exam     Initial Vitals [02/02/20 1527]   BP Pulse Resp Temp SpO2   (!) 153/87 (!) 141 20 97.8 °F (36.6 °C) --      MAP       --         Physical Exam    Nursing note and vitals reviewed.  Constitutional: She appears well-developed and well-nourished.   HENT:   Head: Normocephalic and atraumatic.   Nose: Nose normal.   Mouth/Throat: Oropharynx is clear and moist.   Eyes: Conjunctivae and EOM are normal. Pupils are equal, round, and reactive to light.   Neck: Normal range of motion. Neck supple. No thyromegaly present. No tracheal deviation present. No JVD present.   Cardiovascular: Normal heart sounds and intact distal pulses. An irregularly irregular rhythm present.  Tachycardia present.    No murmur heard.  Pulmonary/Chest: No stridor. No respiratory distress. She has wheezes. She has no rhonchi. She has no  rales. She exhibits no tenderness.   Abdominal: Soft. Bowel sounds are normal. She exhibits no distension. There is no tenderness. There is no rebound.   Musculoskeletal: Normal range of motion. She exhibits tenderness. She exhibits no edema.   Diffuse chronic leg tenderness secondary to to end-stage renal disease   Neurological: She is alert and oriented to person, place, and time. She has normal strength. She displays normal reflexes. No cranial nerve deficit or sensory deficit.   Skin: Skin is warm. Capillary refill takes less than 2 seconds. No pallor.   Psychiatric: She has a normal mood and affect. Thought content normal.         ED Course   Critical Care  Date/Time: 2/2/2020 5:07 PM  Performed by: Flor Hill MD  Authorized by: Flor Hill MD   Direct patient critical care time: 25 minutes  Ordering / reviewing critical care time: 5 minutes  Documentation critical care time: 5 minutes  Consult with family critical care time: 5 minutes  Total critical care time (exclusive of procedural time) : 40 minutes        Labs Reviewed   CBC W/ AUTO DIFFERENTIAL   COMPREHENSIVE METABOLIC PANEL   TROPONIN I   B-TYPE NATRIURETIC PEPTIDE   MAGNESIUM     EKG Readings: (Independently Interpreted)   Initial Reading: No STEMI. Rhythm: Atrial Fibrillation. Ectopy: No Ectopy. Conduction: Normal.   Atrial fibrillation with rapid ventricular response       Imaging Results    None       X-Rays:   Independently Interpreted Readings:   Other Readings:  You megaly and pulmonary vascular congestion noted on chest x-ray    Medical Decision Making:   Differential Diagnosis:   74-year-old female presented emergency department with chest tightness and shortness of breath and cough and wheezing.  Patient's presentation consistent with COPD exacerbation and patient also noted to be in atrial fibrillation.  No pulmonary infiltrates noted. Screening cardiac workup done.  Chest x-ray reviewed.  Patient to get dialysis tomorrow.  Patient treated  with broad-spectrum antibiotic for COPD exacerbation.  Patient also treated for atrial fibrillation with rapid ventricular response with Cardizem.  Nitroglycerin used for mild CHF and also for chest pain.  Symptoms did improve with treatment.  Hospital Medicine consulted for evaluation for admission and further management.  Patient became slightly hypotensive after Cardizem bolus.  Patient responded to IV fluids and breathing treatments and steroids given and patient felt better with her breathing as well.  Hospital Medicine consulted for evaluation for admission.  Patient's troponin elevated however patient does have chronically  Clinical Tests:   Lab Tests: Reviewed  Radiological Study: Reviewed  Medical Tests: Reviewed   elevated troponins.                               Clinical Impression:       ICD-10-CM ICD-9-CM   1. Atrial fibrillation with rapid ventricular response I48.91 427.31   2. Chest pain R07.9 786.50   3. SOB (shortness of breath) R06.02 786.05   4. End stage renal disease N18.6 585.6   5. COPD exacerbation J44.1 491.21                             Flor Hill MD  02/02/20 1623       Flor Hill MD  02/02/20 1709

## 2020-02-03 PROBLEM — R07.9 CHEST PAIN: Status: ACTIVE | Noted: 2020-02-03

## 2020-02-03 PROBLEM — I50.22 CHRONIC SYSTOLIC HEART FAILURE: Status: ACTIVE | Noted: 2020-02-03

## 2020-02-03 PROBLEM — J81.0 ACUTE PULMONARY EDEMA: Status: ACTIVE | Noted: 2020-02-03

## 2020-02-03 LAB
ANION GAP SERPL CALC-SCNC: 12 MMOL/L (ref 8–16)
BNP SERPL-MCNC: >4500 PG/ML (ref 0–99)
BSA FOR ECHO PROCEDURE: 1.64 M2
BUN SERPL-MCNC: 16 MG/DL (ref 8–23)
CALCIUM SERPL-MCNC: 7.9 MG/DL (ref 8.7–10.5)
CHLORIDE SERPL-SCNC: 100 MMOL/L (ref 95–110)
CO2 SERPL-SCNC: 25 MMOL/L (ref 23–29)
CREAT SERPL-MCNC: 3.5 MG/DL (ref 0.5–1.4)
EST. GFR  (AFRICAN AMERICAN): 14.2 ML/MIN/1.73 M^2
EST. GFR  (NON AFRICAN AMERICAN): 12.3 ML/MIN/1.73 M^2
GLUCOSE SERPL-MCNC: 158 MG/DL (ref 70–110)
INR PPP: 3.8
MRSA SCREEN BY PCR: NEGATIVE
POTASSIUM SERPL-SCNC: 4.7 MMOL/L (ref 3.5–5.1)
PROTHROMBIN TIME: 36.3 SEC (ref 10.6–14.8)
SODIUM SERPL-SCNC: 137 MMOL/L (ref 136–145)
TROPONIN I SERPL DL<=0.01 NG/ML-MCNC: 0.12 NG/ML

## 2020-02-03 PROCEDURE — 96366 THER/PROPH/DIAG IV INF ADDON: CPT

## 2020-02-03 PROCEDURE — 84484 ASSAY OF TROPONIN QUANT: CPT

## 2020-02-03 PROCEDURE — 94761 N-INVAS EAR/PLS OXIMETRY MLT: CPT

## 2020-02-03 PROCEDURE — G0378 HOSPITAL OBSERVATION PER HR: HCPCS

## 2020-02-03 PROCEDURE — 99900035 HC TECH TIME PER 15 MIN (STAT)

## 2020-02-03 PROCEDURE — 25000242 PHARM REV CODE 250 ALT 637 W/ HCPCS: Performed by: NURSE PRACTITIONER

## 2020-02-03 PROCEDURE — 25000003 PHARM REV CODE 250

## 2020-02-03 PROCEDURE — 27000221 HC OXYGEN, UP TO 24 HOURS

## 2020-02-03 PROCEDURE — 94640 AIRWAY INHALATION TREATMENT: CPT

## 2020-02-03 PROCEDURE — 25000003 PHARM REV CODE 250: Performed by: NURSE PRACTITIONER

## 2020-02-03 PROCEDURE — 80048 BASIC METABOLIC PNL TOTAL CA: CPT

## 2020-02-03 PROCEDURE — 94640 AIRWAY INHALATION TREATMENT: CPT | Mod: 76

## 2020-02-03 PROCEDURE — 85610 PROTHROMBIN TIME: CPT

## 2020-02-03 PROCEDURE — 83880 ASSAY OF NATRIURETIC PEPTIDE: CPT

## 2020-02-03 PROCEDURE — 25000003 PHARM REV CODE 250: Performed by: INTERNAL MEDICINE

## 2020-02-03 RX ORDER — CHLORHEXIDINE GLUCONATE ORAL RINSE 1.2 MG/ML
15 SOLUTION DENTAL 2 TIMES DAILY
Status: DISCONTINUED | OUTPATIENT
Start: 2020-02-03 | End: 2020-02-05 | Stop reason: HOSPADM

## 2020-02-03 RX ORDER — HYDROCODONE BITARTRATE AND ACETAMINOPHEN 10; 325 MG/1; MG/1
1 TABLET ORAL EVERY 4 HOURS PRN
Status: DISCONTINUED | OUTPATIENT
Start: 2020-02-03 | End: 2020-02-05 | Stop reason: HOSPADM

## 2020-02-03 RX ORDER — HYDROCODONE BITARTRATE AND ACETAMINOPHEN 5; 325 MG/1; MG/1
1 TABLET ORAL ONCE
Status: COMPLETED | OUTPATIENT
Start: 2020-02-03 | End: 2020-02-03

## 2020-02-03 RX ADMIN — IPRATROPIUM BROMIDE AND ALBUTEROL SULFATE 3 ML: .5; 3 SOLUTION RESPIRATORY (INHALATION) at 06:02

## 2020-02-03 RX ADMIN — METOPROLOL SUCCINATE 50 MG: 50 TABLET, EXTENDED RELEASE ORAL at 09:02

## 2020-02-03 RX ADMIN — MAGNESIUM OXIDE 400 MG: 400 TABLET ORAL at 09:02

## 2020-02-03 RX ADMIN — HYDROCODONE BITARTRATE AND ACETAMINOPHEN 1 TABLET: 7.5; 325 TABLET ORAL at 05:02

## 2020-02-03 RX ADMIN — MUPIROCIN: 20 OINTMENT TOPICAL at 12:02

## 2020-02-03 RX ADMIN — DILTIAZEM HYDROCHLORIDE 180 MG: 180 CAPSULE, COATED, EXTENDED RELEASE ORAL at 10:02

## 2020-02-03 RX ADMIN — CHLORHEXIDINE GLUCONATE 15 ML: 1.2 RINSE ORAL at 12:02

## 2020-02-03 RX ADMIN — HYDROCODONE BITARTRATE AND ACETAMINOPHEN 1 TABLET: 7.5; 325 TABLET ORAL at 11:02

## 2020-02-03 RX ADMIN — HYDROCODONE BITARTRATE AND ACETAMINOPHEN 1 TABLET: 5; 325 TABLET ORAL at 01:02

## 2020-02-03 RX ADMIN — IPRATROPIUM BROMIDE AND ALBUTEROL SULFATE 3 ML: .5; 3 SOLUTION RESPIRATORY (INHALATION) at 08:02

## 2020-02-03 NOTE — RESPIRATORY THERAPY
02/02/20 1905   Patient Assessment/Suction   Level of Consciousness (AVPU) alert   Respiratory Effort Unlabored   Expansion/Accessory Muscles/Retractions expansion symmetric;no retractions   PRE-TX-O2   O2 Device (Oxygen Therapy) nasal cannula   Flow (L/min) 3   SpO2 95 %   Pulse 110   Resp (!) 24        02/02/20 1905   Patient Assessment/Suction   Level of Consciousness (AVPU) alert   Respiratory Effort Unlabored   Expansion/Accessory Muscles/Retractions expansion symmetric;no retractions   PRE-TX-O2   O2 Device (Oxygen Therapy) nasal cannula   Flow (L/min) 3   SpO2 95 %   Pulse 110   Resp (!) 24

## 2020-02-03 NOTE — NURSING
Patient crying in the bed and requesting more pain meds. Patient states that she does * not like the wound changed or messed with because the pain is so bad, Patient states that she has to attend a *pain clinic doctor for the pain Dr. Chávez. Md notified and a one time order for Tramadol received. Patient states that the pain meds will not help, the only thing that will help will to be taken off of the dialysis. Tramadol given as RX and HD nurse at [facility].

## 2020-02-03 NOTE — ASSESSMENT & PLAN NOTE
Monitor on telemetry  Cardizem IV can attempt to wean off to her oral.  Consult cardiology  Has persistant AF that is usually rate controlled.

## 2020-02-03 NOTE — PROGRESS NOTES
[facility] Medicine  Progress Note    Patient Name: Griselda Neely  MRN: [id number]  Patient Class: OP- Observation   Admission Date: 2/2/2020  Length of Stay: 0 days  Attending Physician: Homer Carlson MD  Primary Care Provider: Kalie Neely MD        Subjective:     Principal Problem:Atrial fibrillation with RVR        HPI:  Ms Neely is a 75 yo AAF that presented to the ED with co chest pain and was found to be in AF with RVR. She was started on Cardizem IV to slow her rate down and then she became hypotensive. She was then given fluid bolus. She remains on Cardizem IV. Her cardiologist is Dr Frazier. Describes the pain as substernal chest tightness that started when she was at [employer], she then felt palpitations and SOB.  She has a history of COPD and is on chronic oxygen at 3 L via NC. She was SOB and she was placed on BiPAP. Continues to use cigarettes daily.  Also has a history of ESRD and diaylsis days are M/W/F and follows with Dr Morocho. She is anuric.  Past medical history includes: Left atrial thrombus, CHF, COPD, Persistant AF, HTN, CAD, ESRD, Tobacco abuse.    Overview/Hospital Course:  Patient complains of severe pain on her left thigh  She attributes this to her calciphylaxis  Her INR levels on higher range    Interval History:     Review of Systems   Constitutional: Negative for activity change and appetite change.   HENT: Negative for congestion and dental problem.    Eyes: Negative for discharge and itching.   Respiratory: Negative for shortness of breath.    Cardiovascular: Negative for chest pain.   Gastrointestinal: Negative for abdominal distention and abdominal pain.   Endocrine: Negative for cold intolerance.   Genitourinary: Negative for difficulty urinating and dysuria.   Musculoskeletal: Positive for back pain. Negative for arthralgias.   Skin: Negative for color change.   Neurological: Negative for dizziness and facial asymmetry.   Hematological:  Negative for adenopathy.   Psychiatric/Behavioral: Negative for agitation and behavioral problems.     Objective:     Vital Signs (Most Recent):  Temp: 98 °F (36.7 °C) (02/03/20 1547)  Pulse: 100 (02/03/20 1547)  Resp: 20 (02/03/20 1547)  BP: 134/79 (02/03/20 1547)  SpO2: 98 % (02/03/20 1547) Vital Signs (24h Range):  Temp:  [96.9 °F (36.1 °C)-98.3 °F (36.8 °C)] 98 °F (36.7 °C)  Pulse:  [] 100  Resp:  [15-39] 20  SpO2:  [86 %-100 %] 98 %  BP: ()/(33-98) 134/79     Weight: 58.9 kg (129 lb 13.6 oz)(AFTER HD)  Body mass index is 21.61 kg/m².    Intake/Output Summary (Last 24 hours) at 2/3/2020 1748  Last data filed at 2/3/2020 1500  Gross per 24 hour   Intake 2166 ml   Output 3600 ml   Net -1434 ml      Physical Exam   Constitutional: She is oriented to person, place, and time. No distress.   HENT:   Right Ear: External ear normal.   Left Ear: External ear normal.   Eyes: Pupils are equal, round, and reactive to light. EOM are normal.   Neck: Neck supple.   Cardiovascular: Normal rate.   Irregular rhythm   Pulmonary/Chest: Effort normal and breath sounds normal.   Reduced air entry to bases  Bibasilar minimal crackles   Abdominal: Soft. Bowel sounds are normal.   Musculoskeletal: Normal range of motion. She exhibits no edema.   Neurological: She is alert and oriented to person, place, and time.   Skin: Skin is warm.   Calciphylaxis   Psychiatric: She has a normal mood and affect.   Nursing note and vitals reviewed.      Significant Labs:   CBC:   Recent Labs   Lab 02/02/20  1536   WBC 5.57   HGB 10.3*   HCT 34.2*        CMP:   Recent Labs   Lab 02/02/20  1617 02/03/20  0227    137   K 4.1 4.7   CL 94* 100   CO2 31* 25   GLU 81 158*   BUN 34* 16   CREATININE 6.1* 3.5*   CALCIUM 7.7* 7.9*   PROT 7.8  --    ALBUMIN 2.9*  --    BILITOT 0.9  --    ALKPHOS 102  --    AST 28  --    ALT 12  --    ANIONGAP 15 12   EGFRNONAA 6.3* 12.3*       Significant Imaging: I have reviewed all pertinent imaging  results/findings within the past 24 hours.      Assessment/Plan:      * Atrial fibrillation with RVR  Admitted with atrial fibrillation with RVR  Her rate is controlled after she was started on IV Cardizem      Chest pain  Patient admitted with chest pain in the background of acute pulmonary edema/fluid overload and atrial fibrillation with RVR  No evidence of acute coronary syndrome      Acute pulmonary edema  Presented with acute pulmonary edema in the background of chronic systolic heart failure  Also fluid overload from end-stage renal disease  Patient is aneuric.  Patient will have dialysis per Nephrology MD      Left atrial thrombus  Large left atrial mass most likely thrombus  Patient on Coumadin      Chronic systolic heart failure  Chronic systolic heart failure  On last echo ejection fraction was approximately 25%      PVD (peripheral vascular disease)  Patient has history of peripheral vascular disease  She continues to  abuse tobacco      Calciphylaxis  Chronic ongoing issue in the background of end-stage renal disease  Pain medications p.r.n. basis      ESRD (end stage renal disease) on HD M,W, F  Patient presented with acute pulmonary edema/fluid overload  Dialysis per Nephrology MD      Tobacco dependence  Tobacco cessation education provided  Patient continues to smoke      HTN (hypertension)  Blood pressure levels on low normal range  Monitor closely        VTE Risk Mitigation (From admission, onward)         Ordered     Reason for no Mechanical VTE Prophylaxis  Once     Question:  Reasons:  Answer:  Risk of Bleeding    02/02/20 1805     IP VTE HIGH RISK PATIENT  Once      02/02/20 1805                      Franky Maria MD  Department of Hospital Medicine   [facility]

## 2020-02-03 NOTE — RESPIRATORY THERAPY
This note also relates to the following rows which could not be included:  SpO2 - Cannot attach notes to unvalidated device data  BP - Cannot attach notes to unvalidated device data       02/02/20 1856   Patient Assessment/Suction   Level of Consciousness (AVPU) alert   Respiratory Effort Unlabored   Expansion/Accessory Muscles/Retractions expansion symmetric;no retractions   All Lung Fields Breath Sounds wheezes, inspiratory   RUL Breath Sounds wheezes, inspiratory   RML Breath Sounds wheezes, inspiratory   PRE-TX-O2   O2 Device (Oxygen Therapy) BiPAP   Oxygen Concentration (%) 30   Pulse 108   Resp (!) 25   Ready to Wean/Extubation Screen   FIO2<=50 (chart decimal) 0.3   Preset CPAP/BiPAP Settings   Mode Of Delivery BiPAP S/T   $ Is patient using? Yes   Patient CPAP/BiPAP Settings   RR Total (Breaths/Min) 25   Tidal Volume (mL) 421   VE Minute Ventilation (L/min) 10 L/min   Peak Inspiratory Pressure (cm H2O) 13   TiTOT (%) 31   Total Leak (L/Min) 9   Patient Trigger - ST Mode Only (%) 95   Respiratory Evaluation   $ Care Plan Tech Time 15 min   Evaluation For New Orders   Admitting Diagnosis A Fib,ESRD   Pulmonary Diagnosis COPD   Home Oxygen   Has Home Oxygen? Yes   Liter Flow 3   Route nasal cannula   Mode continuous   Device home concentrator   Home Aerosol, MDI, DPI, and Other Treatments/Therapies   Home Respiratory Therapy Per Patient/Review of Chart Yes   DPI Home Meds/Freq Breo/daily

## 2020-02-03 NOTE — PLAN OF CARE
02/03/20 0645   Patient Assessment/Suction   Level of Consciousness (AVPU) alert   Respiratory Effort Normal;Unlabored   All Lung Fields Breath Sounds diminished   STEPHANIE Breath Sounds coarse   LLL Breath Sounds coarse   RUL Breath Sounds coarse   RML Breath Sounds coarse   RLL Breath Sounds crackles   Rhythm/Pattern, Respiratory pattern regular;unlabored   Cough Type bronchospastic   PRE-TX-O2   O2 Device (Oxygen Therapy) nasal cannula   $ Is the patient on Low Flow Oxygen? Yes   SpO2 (!) 94 %   Pulse Oximetry Type Continuous   $ Pulse Oximetry - Multiple Charge Pulse Oximetry - Multiple   Pulse 79   Resp 18   Aerosol Therapy   $ Aerosol Therapy Charges Aerosol Treatment   Daily Review of Necessity (SVN) completed   Respiratory Treatment Status (SVN) given   Treatment Route (SVN) mask   Patient Position (SVN) semi-Clark's   Post Treatment Assessment (SVN) increased aeration   Signs of Intolerance (SVN) none   Breath Sounds Post-Respiratory Treatment   Throughout All Fields Post-Treatment All Fields   Throughout All Fields Post-Treatment aeration increased   Post-treatment Heart Rate (beats/min) 114   Post-treatment Resp Rate (breaths/min) 22   continue q6 tx

## 2020-02-03 NOTE — HOSPITAL COURSE
Patient was managed inpatient at telemetry for the following medical problems,  Atrial fibrillation with RVR  Admitted with atrial fibrillation with RVR  Her rate is controlled after short course of  IV Cardizem  The candidate for cardioversion due to LV thrombus     Chest pain  Patient admitted with chest pain in the background of acute pulmonary edema/fluid overload and atrial fibrillation with RVR  No evidence of acute coronary syndrome   Symptoms  improved after dialysis     Acute pulmonary edema  Presented with acute pulmonary edema in the background of chronic systolic heart failure  Also fluid overload from end-stage renal disease  Patient is aneuric.  Patient will have dialysis per Nephrology MD        Left atrial thrombus  Large left atrial mass most likely thrombus  Patient on Coumadin  INR was supratherapeutic, was adjusted to 2.5mg/5mg EOD        Chronic systolic heart failure  Chronic systolic heart failure  On last echo ejection fraction was approximately 25%        PVD (peripheral vascular disease)  Patient has history of peripheral vascular disease  She continues to  abuse tobacco        Calciphylaxis  Chronic ongoing issue in the background of end-stage renal disease  Pain medications p.r.n. basis        ESRD (end stage renal disease) on HD M,W, F  Patient presented with acute pulmonary edema/fluid overload  Dialysis per Nephrology MD        Tobacco dependence  Tobacco cessation education provided  Patient continues to smoke        HTN (hypertension)  Blood pressure levels on low normal range  Monitor closely    Physical exam on the day of discharge  Physical Exam  HEENT: Normocephalic, atraumatic, PERRL, Conjunctiva pink, no scleral icterus.   CVS: S1S2+, Irregular, SM+, no rubs or gallops, JVP: Normal.  LUNGS: mild rales bilateral improved from admission  ABDOMEN: Soft, NT, BS+  EXTREMITIES: No cyanosis, clubbing or edema in the lower extremities. +Hematoma in the left antecubital fossa.   NEURO:  AAO X 3.       no

## 2020-02-03 NOTE — ASSESSMENT & PLAN NOTE
Chronic ongoing issue in the background of end-stage renal disease  Pain medications p.r.n. basis

## 2020-02-03 NOTE — ASSESSMENT & PLAN NOTE
Patient admitted with chest pain in the background of acute pulmonary edema/fluid overload and atrial fibrillation with RVR  No evidence of acute coronary syndrome

## 2020-02-03 NOTE — ASSESSMENT & PLAN NOTE
Admitted with atrial fibrillation with RVR  Her rate is controlled after she was started on IV Cardizem

## 2020-02-03 NOTE — NURSING
Clarified Coumadin order after PT/INR resulted with Dr. Carlson. Orders received to hold medication and recheck PT and INR in am.

## 2020-02-03 NOTE — ED NOTES
Called report to ICU, pt denies c/o cp or SOB with O2 on NC recently changed per RT. Repositioned for comfort.

## 2020-02-03 NOTE — SUBJECTIVE & OBJECTIVE
Interval History:     Review of Systems   Constitutional: Negative for activity change and appetite change.   HENT: Negative for congestion and dental problem.    Eyes: Negative for discharge and itching.   Respiratory: Negative for shortness of breath.    Cardiovascular: Negative for chest pain.   Gastrointestinal: Negative for abdominal distention and abdominal pain.   Endocrine: Negative for cold intolerance.   Genitourinary: Negative for difficulty urinating and dysuria.   Musculoskeletal: Positive for back pain. Negative for arthralgias.   Skin: Negative for color change.   Neurological: Negative for dizziness and facial asymmetry.   Hematological: Negative for adenopathy.   Psychiatric/Behavioral: Negative for agitation and behavioral problems.     Objective:     Vital Signs (Most Recent):  Temp: 98 °F (36.7 °C) (02/03/20 1547)  Pulse: 100 (02/03/20 1547)  Resp: 20 (02/03/20 1547)  BP: 134/79 (02/03/20 1547)  SpO2: 98 % (02/03/20 1547) Vital Signs (24h Range):  Temp:  [96.9 °F (36.1 °C)-98.3 °F (36.8 °C)] 98 °F (36.7 °C)  Pulse:  [] 100  Resp:  [15-39] 20  SpO2:  [86 %-100 %] 98 %  BP: ()/(33-98) 134/79     Weight: 58.9 kg (129 lb 13.6 oz)(AFTER HD)  Body mass index is 21.61 kg/m².    Intake/Output Summary (Last 24 hours) at 2/3/2020 1748  Last data filed at 2/3/2020 1500  Gross per 24 hour   Intake 2166 ml   Output 3600 ml   Net -1434 ml      Physical Exam   Constitutional: She is oriented to person, place, and time. No distress.   HENT:   Right Ear: External ear normal.   Left Ear: External ear normal.   Eyes: Pupils are equal, round, and reactive to light. EOM are normal.   Neck: Neck supple.   Cardiovascular: Normal rate.   Irregular rhythm   Pulmonary/Chest: Effort normal and breath sounds normal.   Reduced air entry to bases  Bibasilar minimal crackles   Abdominal: Soft. Bowel sounds are normal.   Musculoskeletal: Normal range of motion. She exhibits no edema.   Neurological: She is alert and  oriented to person, place, and time.   Skin: Skin is warm.   Calciphylaxis   Psychiatric: She has a normal mood and affect.   Nursing note and vitals reviewed.      Significant Labs:   CBC:   Recent Labs   Lab 02/02/20  1536   WBC 5.57   HGB 10.3*   HCT 34.2*        CMP:   Recent Labs   Lab 02/02/20  1617 02/03/20  0227    137   K 4.1 4.7   CL 94* 100   CO2 31* 25   GLU 81 158*   BUN 34* 16   CREATININE 6.1* 3.5*   CALCIUM 7.7* 7.9*   PROT 7.8  --    ALBUMIN 2.9*  --    BILITOT 0.9  --    ALKPHOS 102  --    AST 28  --    ALT 12  --    ANIONGAP 15 12   EGFRNONAA 6.3* 12.3*       Significant Imaging: I have reviewed all pertinent imaging results/findings within the past 24 hours.

## 2020-02-03 NOTE — H&P
[facility]  History & Physical    Patient Name: Griselda Neely  MRN: [id number]  Admission Date: 2/2/2020  Attending Physician: TODD Campos  Primary Care Provider: Kalie Neely MD         Patient information was obtained from patient, relative(s), past medical records and ER records.     Subjective:     Principal Problem:Atrial fibrillation with RVR    Chief Complaint:   Chief Complaint   Patient presents with    Chest Pain        HPI: Ms Neely is a 73 yo AAF that presented to the ED with co chest pain and was found to be in AF with RVR. She was started on Cardizem IV to slow her rate down and then she became hypotensive. She was then given fluid bolus. She remains on Cardizem IV. Her cardiologist is Dr Frazier. Describes the pain as substernal chest tightness that started when she was at [employer], she then felt palpitations and SOB.  She has a history of COPD and is on chronic oxygen at 3 L via NC. She was SOB and she was placed on BiPAP. Continues to use cigarettes daily.  Also has a history of ESRD and diaylsis days are M/W/F and follows with Dr Morocho. She is anuric.  Past medical history includes: Left atrial thrombus, CHF, COPD, Persistant AF, HTN, CAD, ESRD, Tobacco abuse.    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    both legs    CHF (congestive heart failure)     Encounter for blood transfusion 03/2016    Gout     Hypertension     Mitral valve regurgitation     Osteoarthritis     Pancreatitis     Peripheral vascular disease     Peritoneal dialysis catheter in place     Pneumonia 09/09/2017    Renal failure        Past Surgical History:   Procedure Laterality Date    ACHILLES TENDON SURGERY Right     ANGIOGRAPHY OF ARTERIOVENOUS SHUNT Right 1/7/2020    Procedure: Fistulogram with Possible Intervention;  Surgeon: Joseph Loyola MD;  Location: [facility] CATH[facility]EP LAB;  Service: Cardiology;  Laterality: Right;     APPENDECTOMY      CARDIAC CATHETERIZATION  07/03/2017    CHOLECYSTECTOMY      heal surgery Right     HYSTERECTOMY      INSERTION OF STENT INTO PERIPHERAL VESSEL N/A 1/7/2020    Procedure: INSERTION, STENT, VESSEL, PERIPHERAL;  Surgeon: Joseph Loyola MD;  Location: [facility] CATH/EP LAB;  Service: Cardiology;  Laterality: N/A;    PARATHYROIDECTOMY      PARATHYROIDECTOMY  07/13/2017    PERCUTANEOUS TRANSLUMINAL ANGIOPLASTY OF ARTERIOVENOUS FISTULA N/A 1/7/2020    Procedure: PTA, AV FISTULA;  Surgeon: Joseph Loyola MD;  Location: [facility] CATH/EP LAB;  Service: Cardiology;  Laterality: N/A;    PERITONEAL CATHETER INSERTION      RENAL BIOPSY      vocal cord nodule         Review of patient's allergies indicates:  No Known Allergies    No current facility-administered medications on file prior to encounter.      Current Outpatient Medications on File Prior to Encounter   Medication Sig    diltiaZEM (CARDIZEM CD) 180 MG 24 hr capsule Take 180 mg by mouth once daily.    HYDROcodone-acetaminophen (NORCO) 7.5-325 mg per tablet Take 1 tablet by mouth every 6 (six) hours as needed for Pain.    isosorbide mononitrate (IMDUR) 30 MG 24 hr tablet Take 30 mg by mouth once daily.    losartan (COZAAR) 25 MG tablet Take 25 mg by mouth once daily.    magnesium oxide (MAG-OX) 400 mg (241.3 mg magnesium) tablet Take 400 mg by mouth once daily.    metoprolol succinate (TOPROL-XL) 50 MG 24 hr tablet Take 1 tablet (50 mg total) by mouth once daily. (Patient taking differently: Take 50 mg by mouth once daily. )    ondansetron (ZOFRAN-ODT) 4 MG TbDL Take 1 tablet (4 mg total) by mouth every 6 (six) hours as needed.    warfarin (COUMADIN) 5 MG tablet Take 1 tablet (5 mg total) by mouth Daily.    oxyCODONE-acetaminophen (PERCOCET) 5-325 mg per tablet Take 1 tablet by mouth daily as needed for Pain (with dialysis).     traMADol (ULTRAM) 50 mg tablet Take 1 tablet (50 mg total) by mouth 3 (three) times a week. 3 TIMES A WEEK AS  NEEDED DURING HEMODIALYSIS    [DISCONTINUED] aspirin 81 MG Chew Take 81 mg by mouth once daily.     [DISCONTINUED] calcium acetate (PHOSLO) 667 mg capsule Take 1,334 mg by mouth 3 (three) times daily with meals.    [DISCONTINUED] fluticasone furoate-vilanterol (BREO) 100-25 mcg/dose diskus inhaler Inhale 1 puff into the lungs once daily. Controller    [DISCONTINUED] sevelamer HCl (RENAGEL) 800 MG Tab Take 1 tablet (800 mg total) by mouth 3 (three) times daily with meals.     Family History     Problem Relation (Age of Onset)    Arthritis Mother    Diabetes Mother, Father    Early death Sister, Sister    Heart disease Sister, Maternal Grandfather, Mother    Heart failure Mother    Hypertension Mother        Tobacco Use    Smoking status: Current Some Day Smoker     Packs/day: 0.50     Years: 45.00     Pack years: 22.50    Smokeless tobacco: Never Used   Substance and Sexual Activity    Alcohol use: No    Drug use: No    Sexual activity: Not on file     Review of Systems   Constitutional: Positive for activity change and fatigue.   HENT: Negative.    Eyes: Negative.    Respiratory: Positive for shortness of breath.    Cardiovascular: Positive for chest pain and palpitations.   Gastrointestinal: Negative.    Endocrine: Negative.    Genitourinary: Negative.    Musculoskeletal: Negative.    Skin: Negative.    Allergic/Immunologic: Negative.    Neurological: Positive for weakness.   Hematological: Negative.    Psychiatric/Behavioral: Negative.      Objective:     Vital Signs (Most Recent):  Temp: 97.8 °F (36.6 °C) (02/02/20 1527)  Pulse: (!) 121 (02/02/20 1800)  Resp: (!) 23 (02/02/20 1800)  BP: 117/63 (02/02/20 1800)  SpO2: 100 % (02/02/20 1800) Vital Signs (24h Range):  Temp:  [97.8 °F (36.6 °C)] 97.8 °F (36.6 °C)  Pulse:  [114-141] 121  Resp:  [20-30] 23  SpO2:  [100 %] 100 %  BP: ()/(57-93) 117/63     Weight: 63.5 kg (140 lb)  Body mass index is 23.3 kg/m².    Physical Exam   Constitutional: She is  oriented to person, place, and time.   Frail appearing female   HENT:   Head: Normocephalic.   Eyes: Pupils are equal, round, and reactive to light.   Neck: Normal range of motion. Neck supple. No thyromegaly present.   Cardiovascular: Normal heart sounds and intact distal pulses.   Irregular rate and rhythm.   Pulmonary/Chest: She has wheezes. She has rales.   Using accessory muscles to breath also has rhonchi throughout.   Abdominal: Soft. Bowel sounds are normal.   Musculoskeletal: Normal range of motion.   Neurological: She is alert and oriented to person, place, and time.   Skin: Skin is warm and dry. Capillary refill takes 2 to 3 seconds.   Has a wound to upper thigh, dressing clean dry and intact.   Psychiatric: She has a normal mood and affect.         CRANIAL NERVES     CN III, IV, VI   Pupils are equal, round, and reactive to light.       Significant Labs:   CBC:   Recent Labs   Lab 02/02/20  1536   WBC 5.57   HGB 10.3*   HCT 34.2*        CMP:   Recent Labs   Lab 02/02/20  1617      K 4.1   CL 94*   CO2 31*   GLU 81   BUN 34*   CREATININE 6.1*   CALCIUM 7.7*   PROT 7.8   ALBUMIN 2.9*   BILITOT 0.9   ALKPHOS 102   AST 28   ALT 12   ANIONGAP 15   EGFRNONAA 6.3*     Cardiac Markers:   Recent Labs   Lab 02/02/20  1617   BNP >4,500*       Significant Imaging:   Results for orders placed or performed during the hospital encounter of 02/02/20   EKG 12-lead    Collection Time: 02/02/20  3:36 PM    Narrative    Test Reason : R07.9,    Vent. Rate : 131 BPM     Atrial Rate : 115 BPM     P-R Int : 000 ms          QRS Dur : 090 ms      QT Int : 332 ms       P-R-T Axes : 000 -43 101 degrees     QTc Int : 490 ms    Atrial fibrillation with rapid ventricular response  Left axis deviation  Abnormal QRS-T angle, consider primary T wave abnormality  Abnormal ECG  When compared with ECG of 03-JAN-2020 00:07,  Nonspecific T wave abnormality no longer evident in Inferior leads  Confirmed by NICOLASA DUMONT MD ([id number]),   Daysi Munoz ([id number]) on 2/2/2020  3:50:16 PM    Referred By: MURTAZA   SELF           Confirmed By:MD BALDWIN CI     Imaging Results          X-Ray Chest AP Portable (Final result)  Result time 02/02/20 16:19:14    Final result by Nga Porter MD (02/02/20 16:19:14)                 Narrative:    PROCEDURE:   XR CHEST AP PORTABLE  dated  2/2/2020 3:54 PM    CLINICAL HISTORY:   Female 74 years of age.   Chest Pain    TECHNIQUE: AP view of the chest obtained portably at 3:54 PM.    PREVIOUS STUDIES:  January 4, 2020    FINDINGS:    Moderate cardiomegaly is stable. There is new interstitial and  probably mild alveolar pulmonary edema. Small right-sided pleural  effusion is unchanged.      IMPRESSION:      1. Stable moderate cardiomegaly.  2. New pulmonary edema.  3. Stable right-sided pleural effusion.    Electronically Signed by Nga Porter on 2/2/2020 5:06 PM                            Assessment/Plan:     * Atrial fibrillation with RVR  Monitor on telemetry  Cardizem IV can attempt to wean off to her oral.  Consult cardiology  Has persistant AF that is usually rate controlled.        COPD exacerbation  Monitor oxygen levels  On chronic oxygen 3 l via NC  Oxygen to keep saturation above 90%, titrate as needed.  Bi Pap as needed.  Respiratory treatments Q 6 hours when awake.  Will hold on pulmonary consult for now.      Diastolic heart failure  Chronic elevation of BNP and troponin        ESRD (end stage renal disease) on HD M,W, F  Consult nephrology  Dialysis M/W/F  Monitor electrolytes  Avoid excess fluids  Renal diet      Tobacco dependence  Tobacco cessation      HTN (hypertension)  Monitor and trend BP  Continue home BP medication        VTE Risk Mitigation (From admission, onward)         Ordered     warfarin (COUMADIN) tablet 5 mg  Daily      02/02/20 1805     Reason for no Mechanical VTE Prophylaxis  Once     Question:  Reasons:  Answer:  Risk of Bleeding    02/02/20 1805     IP VTE HIGH RISK  PATIENT  Once      02/02/20 [id number]                   TODD Campos  Department of Hospital Medicine   [facility]

## 2020-02-03 NOTE — PLAN OF CARE
02/03/20 1551   CORTEZ Message   Medicare Outpatient and Observation Notification regarding financial responsibility Given to patient/caregiver;Explained to patient/caregiver;Signed/date by patient/caregiver   Date CORTEZ was signed 02/03/20   Time CORTEZ was signed [id number]

## 2020-02-03 NOTE — NURSING
Patient arrived from the ER after complaints of SOB while at a [employer] service. Patient c/o SOB with wheezes and crackles in the bases heard upon auscultation. PIV x2 noted to the the left arm with Cardizem infusing at 5 mg/hr. Cardiac monitor shows atrial fib with RVR.  Patient noted to have a fistula to the left arm with a +thrill and + bruit. Full assessment completed. HD orders noted and consent obtained for in hospital HD.

## 2020-02-03 NOTE — ASSESSMENT & PLAN NOTE
Monitor oxygen levels  On chronic oxygen 3 l via NC  Oxygen to keep saturation above 90%, titrate as needed.  Bi Pap as needed.  Respiratory treatments Q 6 hours when awake.  Will hold on pulmonary consult for now.

## 2020-02-03 NOTE — ASSESSMENT & PLAN NOTE
Presented with acute pulmonary edema in the background of chronic systolic heart failure  Also fluid overload from end-stage renal disease  Patient is aneuric.  Patient will have dialysis per Nephrology MD

## 2020-02-03 NOTE — NURSING
Patient allowed dressing to be removed from her thigh to assess the wound  That she attends wound care every other day for dressing changes. Pictures taken upon removal of dressing. Wound cause intense pain.. No foul smell ot drainage noted. Inside of wound bleeding noted. Applied a non-adhesive dressing and wound care consulted. Patient unable to tell me what  Is applied on the wound during wound care changes. *possible honey*.

## 2020-02-03 NOTE — NURSING
Pt transfer to room 2507 via wheelchair on telemetry monitor  Assisted in chair in room Staff informed Huma received pt

## 2020-02-03 NOTE — RESPIRATORY THERAPY
02/02/20 1914   Patient Assessment/Suction   Level of Consciousness (AVPU) alert   Respiratory Effort Unlabored   Expansion/Accessory Muscles/Retractions expansion symmetric;no retractions;no use of accessory muscles   All Lung Fields Breath Sounds wheezes, inspiratory   RUL Breath Sounds wheezes, inspiratory   RML Breath Sounds wheezes, inspiratory   Cough Type bronchospastic;fair;nonproductive   PRE-TX-O2   O2 Device (Oxygen Therapy) nasal cannula   Flow (L/min) 3   SpO2 97 %   Pulse 107   Resp (!) 25   Aerosol Therapy   $ Aerosol Therapy Charges Initial Continuous   Daily Review of Necessity (SVN) completed   Respiratory Treatment Status (SVN) continuous;given   Treatment Route (SVN) air;mask   Patient Position (SVN) semi-Clark's   Post Treatment Assessment (SVN) breath sounds improved   Signs of Intolerance (SVN) none   Breath Sounds Post-Respiratory Treatment   Throughout All Fields Post-Treatment All Fields   Throughout All Fields Post-Treatment aeration increased   Post-treatment Heart Rate (beats/min) 114   Post-treatment Resp Rate (breaths/min) 26   Respiratory Interventions   Cough And Deep Breathing done with encouragement   Breathing Techniques/Airway Clearance deep/controlled cough encouraged;diaphragmatic breathing promoted   Respiratory Evaluation   $ Care Plan Tech Time 15 min   Continuous neb with 1mg Ipratropuim and 2.5 mg Levalbuterol

## 2020-02-03 NOTE — CONSULTS
HPI: Ms Neely is a 73 yo AAF with ESRD, HTN, COPD with continued daily tobacco use, DM, h/o MVR, who presented to the ED with co chest pain and was found to be in AF with RVR. She was started on Cardizem IV to slow her rate down and then she became hypotensive. She was then given fluid bolus but was also noted to have pulmonary edema on CXR.  Also has a history of ESRD and diaylsis days are M/W/F and follows with our group. She is anuric.          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     both legs    CHF (congestive heart failure)      Encounter for blood transfusion 03/2016    Gout      Hypertension      Mitral valve regurgitation      Osteoarthritis      Pancreatitis      Peripheral vascular disease      Peritoneal dialysis catheter in place      Pneumonia 09/09/2017    Renal failure                 Past Surgical History:   Procedure Laterality Date    ACHILLES TENDON SURGERY Right      ANGIOGRAPHY OF ARTERIOVENOUS SHUNT Right 1/7/2020     Procedure: Fistulogram with Possible Intervention;  Surgeon: Joseph Loyola MD;  Location: [facility] CATH/EP LAB;  Service: Cardiology;  Laterality: Right;    APPENDECTOMY        CARDIAC CATHETERIZATION   07/03/2017    CHOLECYSTECTOMY        heal surgery Right      HYSTERECTOMY        INSERTION OF STENT INTO PERIPHERAL VESSEL N/A 1/7/2020     Procedure: INSERTION, STENT, VESSEL, PERIPHERAL;  Surgeon: Joseph Loyola MD;  Location: [facility] CATH/EP LAB;  Service: Cardiology;  Laterality: N/A;    PARATHYROIDECTOMY        PARATHYROIDECTOMY   07/13/2017    PERCUTANEOUS TRANSLUMINAL ANGIOPLASTY OF ARTERIOVENOUS FISTULA N/A 1/7/2020     Procedure: PTA, AV FISTULA;  Surgeon: Joseph Loyola MD;  Location: [facility] CATH/EP LAB;  Service: Cardiology;  Laterality: N/A;    PERITONEAL CATHETER INSERTION        RENAL BIOPSY        vocal cord nodule             Review of patient's allergies indicates:  No Known Allergies     No  current facility-administered medications on file prior to encounter.            Current Outpatient Medications on File Prior to Encounter   Medication Sig    diltiaZEM (CARDIZEM CD) 180 MG 24 hr capsule Take 180 mg by mouth once daily.    HYDROcodone-acetaminophen (NORCO) 7.5-325 mg per tablet Take 1 tablet by mouth every 6 (six) hours as needed for Pain.    isosorbide mononitrate (IMDUR) 30 MG 24 hr tablet Take 30 mg by mouth once daily.    losartan (COZAAR) 25 MG tablet Take 25 mg by mouth once daily.    magnesium oxide (MAG-OX) 400 mg (241.3 mg magnesium) tablet Take 400 mg by mouth once daily.    metoprolol succinate (TOPROL-XL) 50 MG 24 hr tablet Take 1 tablet (50 mg total) by mouth once daily. (Patient taking differently: Take 50 mg by mouth once daily. )    ondansetron (ZOFRAN-ODT) 4 MG TbDL Take 1 tablet (4 mg total) by mouth every 6 (six) hours as needed.    warfarin (COUMADIN) 5 MG tablet Take 1 tablet (5 mg total) by mouth Daily.    oxyCODONE-acetaminophen (PERCOCET) 5-325 mg per tablet Take 1 tablet by mouth daily as needed for Pain (with dialysis).     traMADol (ULTRAM) 50 mg tablet Take 1 tablet (50 mg total) by mouth 3 (three) times a week. 3 TIMES A WEEK AS NEEDED DURING HEMODIALYSIS    [DISCONTINUED] aspirin 81 MG Chew Take 81 mg by mouth once daily.     [DISCONTINUED] calcium acetate (PHOSLO) 667 mg capsule Take 1,334 mg by mouth 3 (three) times daily with meals.    [DISCONTINUED] fluticasone furoate-vilanterol (BREO) 100-25 mcg/dose diskus inhaler Inhale 1 puff into the lungs once daily. Controller    [DISCONTINUED] sevelamer HCl (RENAGEL) 800 MG Tab Take 1 tablet (800 mg total) by mouth 3 (three) times daily with meals.           Family History      Problem Relation (Age of Onset)     Arthritis Mother     Diabetes Mother, Father     Early death Sister, Sister     Heart disease Sister, Maternal Grandfather, Mother     Heart failure Mother     Hypertension Mother                 Tobacco Use    Smoking status: Current Some Day Smoker       Packs/day: 0.50       Years: 45.00       Pack years: 22.50    Smokeless tobacco: Never Used   Substance and Sexual Activity    Alcohol use: No    Drug use: No    Sexual activity: Not on file         PE:  Vital Signs      Report   View Graph    02/02 0700  02/03 0659 02/03 0700  02/03 1732  Most Recent     Temp (°F) 97.3-98.3 96.9-98  98 (36.7)  02/03 1547   Pulse    100  02/03 1547   Resp 15-39 18-32  20  02/03 1547   BP 81//80 82//79  134/79  02/03 1547   MAP (mmHg)  44-83  70  02/03 1400   SpO2 (%)  86-98  98  02/03 1547   Weight (kg) 58.9-65.3         Intake/Output      Report   View Table   Intake Total  950mL  (950 mL / 58.9 kg = 16.1 mL/kg)  2/3 0700 - 2/4 0659 07002/048812/3  3,600  250  600  46  (0.8)  420  950  -2,284  +950  1,316  (22.3)  950  (16.1)  3,600  P.O.  I.V.  Other  IV Piggyback  In  Out  Other  Net                LABS:  Results for SRIKANTH LUONG (MRN [id number]) as of 2/3/2020 17:32   Ref. Range 2/2/2020 16:17 2/3/2020 02:27   Sodium Latest Ref Range: 136 - 145 mmol/L 140 137   Potassium Latest Ref Range: 3.5 - 5.1 mmol/L 4.1 4.7   Chloride Latest Ref Range: 95 - 110 mmol/L 94 (L) 100   CO2 Latest Ref Range: 23 - 29 mmol/L 31 (H) 25   Anion Gap Latest Ref Range: 8 - 16 mmol/L 15 12   BUN, Bld Latest Ref Range: 8 - 23 mg/dL 34 (H) 16   Creatinine Latest Ref Range: 0.5 - 1.4 mg/dL 6.1 (H) 3.5 (H)   eGFR if non African American Latest Ref Range: >60 mL/min/1.73 m^2 6.3 (A) 12.3 (A)   eGFR if African American Latest Ref Range: >60 mL/min/1.73 m^2 7.3 (A) 14.2 (A)   Glucose Latest Ref Range: 70 - 110 mg/dL 81 158 (H)   Calcium Latest Ref Range: 8.7 - 10.5 mg/dL 7.7 (L) 7.9 (L)   Magnesium Latest Ref Range: 1.6 - 2.6 mg/dL 1.8    Alkaline Phosphatase Latest Ref Range: 55 - 135 U/L 102    PROTEIN TOTAL Latest Ref Range: 6.0 - 8.4 g/dL 7.8    Albumin Latest Ref Range: 3.5 - 5.2 g/dL 2.9 (L)     BILIRUBIN TOTAL Latest Ref Range: 0.1 - 1.0 mg/dL 0.9    AST Latest Ref Range: 10 - 40 U/L 28    ALT Latest Ref Range: 10 - 44 U/L 12    Results for SRIKANTH LUONG (MRN [id number]) as of 2/3/2020 17:32   Ref. Range 2/2/2020 15:36   WBC Latest Ref Range: 3.90 - 12.70 K/uL 5.57   RBC Latest Ref Range: 4.00 - 5.40 M/uL 3.72 (L)   Hemoglobin Latest Ref Range: 12.0 - 16.0 g/dL 10.3 (L)   Hematocrit Latest Ref Range: 37.0 - 48.5 % 34.2 (L)   MCV Latest Ref Range: 82 - 98 fL 92   MCH Latest Ref Range: 27.0 - 31.0 pg 27.7   MCHC Latest Ref Range: 32.0 - 36.0 g/dL 30.1 (L)   RDW Latest Ref Range: 11.5 - 14.5 % 18.4 (H)   Platelets Latest Ref Range: 150 - 350 K/uL 347   MPV Latest Ref Range: 9.2 - 12.9 fL 10.7   Gran% Latest Ref Range: 38.0 - 73.0 % 69.4   Gran # (ANC) Latest Ref Range: 1.8 - 7.7 K/uL 3.9   Lymph% Latest Ref Range: 18.0 - 48.0 % 17.1 (L)   Lymph # Latest Ref Range: 1.0 - 4.8 K/uL 1.0   Mono% Latest Ref Range: 4.0 - 15.0 % 12.2   Mono # Latest Ref Range: 0.3 - 1.0 K/uL 0.7   Eosinophil% Latest Ref Range: 0.0 - 8.0 % 0.7   Eos # Latest Ref Range: 0.0 - 0.5 K/uL 0.0   Basophil% Latest Ref Range: 0.0 - 1.9 % 0.2   Baso # Latest Ref Range: 0.00 - 0.20 K/uL 0.01   nRBC Latest Ref Range: 0 /100 WBC 0   Differential Method Unknown Automated   Immature Grans (Abs) Latest Ref Range: 0.00 - 0.04 K/uL 0.02   Immature Granulocytes Latest Ref Range: 0.0 - 0.5 % 0.4                                     PREVIOUS STUDIES:  January 4, 2020    FINDINGS:    Moderate cardiomegaly is stable. There is new interstitial and  probably mild alveolar pulmonary edema. Small right-sided pleural  effusion is unchanged.      IMPRESSION:      1. Stable moderate cardiomegaly.  2. New pulmonary edema.  3. Stable right-sided pleural effusion.          A/P:    A/w decompensated CHF, pulmonary edema, hypoxia. Dialysed urgently for these findings.   Better this AM    Decompensated CHF  UF again today  Cards to eval further     ESRD  HD again  today    HTN  F/u with UF on HD    COPD exacerbation  Refuses tobacco cessation    Anemia  Monitor  DONNY with HD     Sec HPT  F/u PO4

## 2020-02-03 NOTE — NURSING
Patient anxious and complaining that she cannot breathe.  Patient states that she takes an anti-anxiety medication, but has no idea what the medication.  Awaiting HD nurse to arrive and begin HD. MD ordered one time ativan po

## 2020-02-03 NOTE — NURSING
HD tx complete. Total UF 3 liters. Pt tolerated well. Reinfused pt per policy. Removed hd needles x 2. Gauze dressing secured with tape. Light pressure held. Hemostasis achieved. Report given to Gabriela Coronado RN.

## 2020-02-03 NOTE — NURSING
MD notified of an 11 beat run of v-tach, patient returned to [facility] with a controlled rate. Morning labs sent early. No further orders received.

## 2020-02-03 NOTE — CONSULTS
[facility]  Cardiology  Consult Note    Patient Name: Griselda Neely  MRN: [id number]  Admission Date: 2/2/2020  Hospital Length of Stay: 0 days  Code Status: Full Code   Attending Provider: Homer Carlson MD   Consulting Provider: Ramila Bloom NP  Primary Care Physician: Kalie Neely MD  Principal Problem:Atrial fibrillation with RVR    Patient information was obtained from patient, past medical records and ER records.     Inpatient consult to Cardiology  Consult performed by: Katharina Powell MD  Consult ordered by: Franky Maria MD        Subjective:     REASON FOR CONSULT:  Shortness of breath     HPI:   Ms. 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 Patient was recently admitted last month at which time she had a TRUPTI to confirm diagnosis of YOUNG thrombus. Oral anticoagulation with Coumadin was initiated at that time. Reports the chest pain started yesterday morning while at [employer]. She is not sure how long it lasted. She reports increasing shortness of breath over the last 2 days. Reports that on Friday, her dialysis treatment had to be stopped about 45 minutes early due to her leg pains. In the ED she was found to be in Afib with RVR which was controlled on Cardizem drip. She was placed on bipap for breathing difficulty. She denies any cough, fever, chills. She does report occasional nausea when she uses Tramadol. Denies diarrhea. She denies any PND or orthopnea. Reports compliance with all of her medications. She does not follow with any specific diet. Reports she had been doing well since her last hospitalization and had been able to walk around her house and prepare  her meals. Work up revealed elevated BNP and mild elevation in troponin which do appear to be chronic for her. CXR initially showed some pulmonary edema which looks better on today's CXR. She received extra HD treatment last night. Her atrial fibrillation is now rate-controlled off of any drips. She denies any further chest pain and reports her breathing difficulty is much better.     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    both legs    CHF (congestive heart failure)     Encounter for blood transfusion 03/2016    Gout     Hypertension     Mitral valve regurgitation     Osteoarthritis     Pancreatitis     Peripheral vascular disease     Peritoneal dialysis catheter in place     Pneumonia 09/09/2017    Renal failure        Past Surgical History:   Procedure Laterality Date    ACHILLES TENDON SURGERY Right     ANGIOGRAPHY OF ARTERIOVENOUS SHUNT Right 1/7/2020    Procedure: Fistulogram with Possible Intervention;  Surgeon: Joseph Loyola MD;  Location: [facility] CATH/EP LAB;  Service: Cardiology;  Laterality: Right;    APPENDECTOMY      CARDIAC CATHETERIZATION  07/03/2017    CHOLECYSTECTOMY      heal surgery Right     HYSTERECTOMY      INSERTION OF STENT INTO PERIPHERAL VESSEL N/A 1/7/2020    Procedure: INSERTION, STENT, VESSEL, PERIPHERAL;  Surgeon: Joseph Loyola MD;  Location: [facility] CATH/EP LAB;  Service: Cardiology;  Laterality: N/A;    PARATHYROIDECTOMY      PARATHYROIDECTOMY  07/13/2017    PERCUTANEOUS TRANSLUMINAL ANGIOPLASTY OF ARTERIOVENOUS FISTULA N/A 1/7/2020    Procedure: PTA, AV FISTULA;  Surgeon: Joseph Loyola MD;  Location: [facility] CATH/EP LAB;  Service: Cardiology;  Laterality: N/A;    PERITONEAL CATHETER INSERTION      RENAL BIOPSY      vocal cord nodule         Review of patient's allergies indicates:  No Known Allergies    No current facility-administered medications on file prior to encounter.      Current Outpatient Medications on File  Prior to Encounter   Medication Sig    diltiaZEM (CARDIZEM CD) 180 MG 24 hr capsule Take 180 mg by mouth once daily.    HYDROcodone-acetaminophen (NORCO) 7.5-325 mg per tablet Take 1 tablet by mouth every 6 (six) hours as needed for Pain.    isosorbide mononitrate (IMDUR) 30 MG 24 hr tablet Take 30 mg by mouth once daily.    losartan (COZAAR) 25 MG tablet Take 25 mg by mouth once daily.    magnesium oxide (MAG-OX) 400 mg (241.3 mg magnesium) tablet Take 400 mg by mouth once daily.    metoprolol succinate (TOPROL-XL) 50 MG 24 hr tablet Take 1 tablet (50 mg total) by mouth once daily. (Patient taking differently: Take 50 mg by mouth once daily. )    ondansetron (ZOFRAN-ODT) 4 MG TbDL Take 1 tablet (4 mg total) by mouth every 6 (six) hours as needed.    warfarin (COUMADIN) 5 MG tablet Take 1 tablet (5 mg total) by mouth Daily.    oxyCODONE-acetaminophen (PERCOCET) 5-325 mg per tablet Take 1 tablet by mouth daily as needed for Pain (with dialysis).     traMADol (ULTRAM) 50 mg tablet Take 1 tablet (50 mg total) by mouth 3 (three) times a week. 3 TIMES A WEEK AS NEEDED DURING HEMODIALYSIS       Scheduled Meds:   sodium chloride 0.9%   Intravenous Once    albuterol-ipratropium  3 mL Nebulization Q6H WAKE    chlorhexidine  15 mL Mouth/Throat BID    diltiaZEM  180 mg Oral Daily    magnesium oxide  400 mg Oral Daily    metoprolol succinate  50 mg Oral Daily    mupirocin   Nasal BID     Continuous Infusions:   dilTIAZem Stopped (02/03/20 0130)     PRN Meds:.sodium chloride 0.9%, HYDROcodone-acetaminophen, ondansetron, sodium chloride 0.9%    Family History     Problem Relation (Age of Onset)    Arthritis Mother    Diabetes Mother, Father    Early death Sister, Sister    Heart disease Sister, Maternal Grandfather, Mother    Heart failure Mother    Hypertension Mother          Tobacco Use    Smoking status: Current Some Day Smoker     Packs/day: 0.50     Years: 45.00     Pack years: 22.50    Smokeless  tobacco: Never Used   Substance and Sexual Activity    Alcohol use: No    Drug use: No    Sexual activity: Not on file       ROS   No significant headaches or sore throat or runny nose.   No recent changes in vision.   No recent changes in hearing.  No dysphagia or odynophagia.  Reports chest pain and shortness of breath.  Denies any cough or hemoptysis.   Denies any abdominal pain, nausea, vomiting, diarrhea or constipation.   Denies any dysuria or polyuria.   Denies any fevers or chills.   Denies any recent significant weight changes.   Denies bleeding diathesis    Objective:     Vital Signs (Most Recent):  Temp: 96.9 °F (36.1 °C) (02/03/20 1101)  Pulse: 81 (02/03/20 1411)  Resp: (!) 25 (02/03/20 1411)  BP: (!) 101/58 (02/03/20 1400)  SpO2: 97 % (02/03/20 1411) Vital Signs (24h Range):  Temp:  [96.9 °F (36.1 °C)-98.3 °F (36.8 °C)] 96.9 °F (36.1 °C)  Pulse:  [] 81  Resp:  [15-39] 25  SpO2:  [86 %-100 %] 97 %  BP: ()/(33-98) 101/58     Weight: 58.9 kg (129 lb 13.6 oz)(AFTER HD)  Body mass index is 21.61 kg/m².    SpO2: 97 %  O2 Device (Oxygen Therapy): nasal cannula      Intake/Output Summary (Last 24 hours) at 2/3/2020 1447  Last data filed at 2/3/2020 0600  Gross per 24 hour   Intake 1316 ml   Output 3600 ml   Net -2284 ml       Lines/Drains/Airways     Drain                 Hemodialysis AV Fistula 08/08/19 0214 Right upper arm 179 days          Peripheral Intravenous Line                 Peripheral IV - Single Lumen 02/02/20 1549 20 G Left Forearm less than 1 day                Physical Exam  HEENT: Normocephalic, atraumatic, PERRL, Conjunctiva pink, no scleral icterus.   CVS: S1S2+, irregular, + SM, no rubs or gallops, JVP: Normal.  LUNGS: Clear, fine crackles to the bases   ABDOMEN: Soft, NT, BS+  EXTREMITIES: No cyanosis, clubbing, trace edema. Peripheral pulses palpable. Left upper thigh with dressing CDI.   NEURO: AAO X 3.      Significant Labs:   Blood Culture:   Recent Labs   Lab  02/02/20  1612   LABBLOO No Growth to date  No Growth to date   , BMP:   Recent Labs   Lab 02/02/20  1617 02/03/20 0227   GLU 81 158*    137   K 4.1 4.7   CL 94* 100   CO2 31* 25   BUN 34* 16   CREATININE 6.1* 3.5*   CALCIUM 7.7* 7.9*   MG 1.8  --    , CMP   Recent Labs   Lab 02/02/20  1617 02/03/20 0227    137   K 4.1 4.7   CL 94* 100   CO2 31* 25   GLU 81 158*   BUN 34* 16   CREATININE 6.1* 3.5*   CALCIUM 7.7* 7.9*   PROT 7.8  --    ALBUMIN 2.9*  --    BILITOT 0.9  --    ALKPHOS 102  --    AST 28  --    ALT 12  --    ANIONGAP 15 12   ESTGFRAFRICA 7.3* 14.2*   EGFRNONAA 6.3* 12.3*   , CBC   Recent Labs   Lab 02/02/20  1536   WBC 5.57   HGB 10.3*   HCT 34.2*      , INR   Recent Labs   Lab 02/02/20  1617 02/03/20 0227   INR 3.6 3.8   , Lipid Panel No results for input(s): CHOL, HDL, LDLCALC, TRIG, CHOLHDL in the last 48 hours.,   Pathology Results  (Last 10 years)    None      , Troponin   Recent Labs   Lab 02/02/20  1617 02/02/20 2008 02/03/20 0227   TROPONINI 0.191* 0.175* 0.116*   , All pertinent lab results from the last 24 hours have been reviewed. and   Recent Lab Results       02/03/20  0249   02/03/20  0227   02/02/20  2311   02/02/20 2021 02/02/20 2008        Albumin               Alkaline Phosphatase               ALT               Anion Gap   12           AST               Baso #               Basophil%               BILIRUBIN TOTAL               Blood Culture, Routine               BNP >4,500  Comment:  Values of less than 100 pg/ml are consistent with non-CHF populations.             BUN, Bld   16           Calcium   7.9           Chloride   100           CO2   25           Creatinine   3.5           Differential Method               eGFR if    14.2           eGFR if non    12.3  Comment:  Calculation used to obtain the estimated glomerular filtration  rate (eGFR) is the CKD-EPI equation.              Eos #               Eosinophil%                Glucose   158  Comment:  Reviewed by Technologist.           Gran # (ANC)               Gran%               Hematocrit               Hemoglobin               Immature Grans (Abs)               Immature Granulocytes               INR   3.8  Comment:  Coumadin Therapy:  INR: 2.0-3.0 conventional anticoagulation  INR: 2.5-3.5 intensive anticoagulation             Lactate, Ned         1.6  Comment:  Falsely low lactic acid results can be found in samples   containing >=13.0 mg/dL total bilirubin and/or >=3.5 mg/dL   direct bilirubin.       Lymph #               Lymph%               Magnesium               MCH               MCHC               MCV               Mono #               Mono%               MPV               MRSA SCREEN BY PCR     Negative         nRBC               Platelets               POC Glucose       131       Potassium   4.7           PROTEIN TOTAL               PT   36.3           RBC               RDW               Sodium   137           Troponin I   0.116  Comment:  Troponin critical result(s) called and verbal readback obtained   from Hanna Coronado RN ICU.  by [facility] 02/03/2020 03:30       0.175  Comment:  Troponin  critical result(s) called and verbal readback obtained   from Erna Delgado RN ICU. by [facility] 02/02/2020 21:03       WBC                                02/02/20  1617   02/02/20  1612   02/02/20  1536        Albumin 2.9         Alkaline Phosphatase 102         ALT 12         Anion Gap 15         AST 28         Baso #     0.01     Basophil%     0.2     BILIRUBIN TOTAL 0.9  Comment:  For infants and newborns, interpretation of results should be based  on gestational age, weight and in agreement with clinical  observations.  Premature Infant recommended reference ranges:  Up to 24 hours.............<8.0 mg/dL  Up to 48 hours............<12.0 mg/dL  3-5 days..................<15.0 mg/dL  6-29 days.................<15.0 mg/dL           Blood Culture, Routine   No Growth to date[P]           No Growth to date[P]       BNP >4,500  Comment:  Values of less than 100 pg/ml are consistent with non-CHF populations.         BUN, Bld 34         Calcium 7.7         Chloride 94         CO2 31         Creatinine 6.1         Differential Method     Automated     eGFR if  7.3         eGFR if non  6.3  Comment:  Calculation used to obtain the estimated glomerular filtration  rate (eGFR) is the CKD-EPI equation.            Eos #     0.0     Eosinophil%     0.7     Glucose 81         Gran # (ANC)     3.9     Gran%     69.4     Hematocrit     34.2     Hemoglobin     10.3     Immature Grans (Abs)     0.02  Comment:  Mild elevation in immature granulocytes is non specific and   can be seen in a variety of conditions including stress response,   acute inflammation, trauma and pregnancy. Correlation with other   laboratory and clinical findings is essential.       Immature Granulocytes     0.4     INR 3.6  Comment:  Coumadin Therapy:  INR: 2.0-3.0 conventional anticoagulation  INR: 2.5-3.5 intensive anticoagulation           Lactate, Ned   1.9  Comment:  Falsely low lactic acid results can be found in samples   containing >=13.0 mg/dL total bilirubin and/or >=3.5 mg/dL   direct bilirubin.         Lymph #     1.0     Lymph%     17.1     Magnesium 1.8         MCH     27.7     MCHC     30.1     MCV     92     Mono #     0.7     Mono%     12.2     MPV     10.7     MRSA SCREEN BY PCR           nRBC     0     Platelets     347     POC Glucose           Potassium 4.1         PROTEIN TOTAL 7.8         PT 34.6         RBC     3.72     RDW     18.4     Sodium 140         Troponin I 0.191  Comment:  Troponin critical result(s) called and verbal readback obtained   from Ilene Porter RN/ED  by J[facility]ona 02/02/2020 16:53           WBC     5.57           Significant Imaging: X-Ray: CXR: X-Ray Chest 1 View (CXR): No results found for this visit on 02/02/20.  Assessment and Plan:     IMPRESSION:  Chest pain.  Troponin mildly elevated (chronic). ECG Without dynamic changes. Not consistent with ACS.   Shortness of breath. Pulmonary edema noted on CXR- seems improved after HD last night.   Large left atrial mass. Likely thrombus. TRUPTI with large thrombus in the roof of the left atrium as well as in the YOUNG. CT Abdomen with left atrial filling defect. On Coumadin. INR supratherapeutic at 3.8. Holding Coumadin tonight.   Congestive heart failure. Chronic. LVEF ~25% on last echo. Chronically elevated BNP.   COPD.  Calciphlyaxis.   Atrial fibrillation. Valvular. Persistent. RVR has resolved after short course of IV Cardizem.   Hypertension. Better controlled at present. Elevates with agitation. Periods of hypotension.    s/p MVR via right thoracotomy approach on 3/18/19 with Dr. Lora. Functioning appropriately per last echo.   CAD. Nonobstructive on LHC done on 3/13/2019.   End-stage renal disease on hemodialysis MWF. Anuric. Followed by Dr. Ingram.   Chronic tobacco abuse.    RECOMMENDATIONS:  1. Plans noted for HD this evening.   2. Patient is anuric, would not benefit from diuretics.  3. Agree Hold Coumadin- repeat INR in the AM.  4. No ischemic work up necessary at this time.   Thank you for your consult. I will follow-up with patient. Please contact us if you have any additional questions.    Ramila Bloom NP  Cardiology   [facility]    I have personally seen and examined the patient. I reviewed the notes, assessments, and/or procedures performed by Ms Ramila Bloom, I concur with her documentation of Griselda Neely.

## 2020-02-03 NOTE — SUBJECTIVE & OBJECTIVE
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    both legs    CHF (congestive heart failure)     Encounter for blood transfusion 03/2016    Gout     Hypertension     Mitral valve regurgitation     Osteoarthritis     Pancreatitis     Peripheral vascular disease     Peritoneal dialysis catheter in place     Pneumonia 09/09/2017    Renal failure        Past Surgical History:   Procedure Laterality Date    ACHILLES TENDON SURGERY Right     ANGIOGRAPHY OF ARTERIOVENOUS SHUNT Right 1/7/2020    Procedure: Fistulogram with Possible Intervention;  Surgeon: Joseph Loyola MD;  Location: [facility] CATH/EP LAB;  Service: Cardiology;  Laterality: Right;    APPENDECTOMY      CARDIAC CATHETERIZATION  07/03/2017    CHOLECYSTECTOMY      heal surgery Right     HYSTERECTOMY      INSERTION OF STENT INTO PERIPHERAL VESSEL N/A 1/7/2020    Procedure: INSERTION, STENT, VESSEL, PERIPHERAL;  Surgeon: Joseph Loyola MD;  Location: [facility] CATH/EP LAB;  Service: Cardiology;  Laterality: N/A;    PARATHYROIDECTOMY      PARATHYROIDECTOMY  07/13/2017    PERCUTANEOUS TRANSLUMINAL ANGIOPLASTY OF ARTERIOVENOUS FISTULA N/A 1/7/2020    Procedure: PTA, AV FISTULA;  Surgeon: Joseph Loyola MD;  Location: [facility] CATH/EP LAB;  Service: Cardiology;  Laterality: N/A;    PERITONEAL CATHETER INSERTION      RENAL BIOPSY      vocal cord nodule         Review of patient's allergies indicates:  No Known Allergies    No current facility-administered medications on file prior to encounter.      Current Outpatient Medications on File Prior to Encounter   Medication Sig    diltiaZEM (CARDIZEM CD) 180 MG 24 hr capsule Take 180 mg by mouth once daily.    HYDROcodone-acetaminophen (NORCO) 7.5-325 mg per tablet Take 1 tablet by mouth every 6 (six) hours as needed for Pain.    isosorbide mononitrate (IMDUR) 30 MG 24 hr tablet Take 30 mg by mouth once daily.    losartan (COZAAR) 25 MG tablet Take 25 mg by mouth once  daily.    magnesium oxide (MAG-OX) 400 mg (241.3 mg magnesium) tablet Take 400 mg by mouth once daily.    metoprolol succinate (TOPROL-XL) 50 MG 24 hr tablet Take 1 tablet (50 mg total) by mouth once daily. (Patient taking differently: Take 50 mg by mouth once daily. )    ondansetron (ZOFRAN-ODT) 4 MG TbDL Take 1 tablet (4 mg total) by mouth every 6 (six) hours as needed.    warfarin (COUMADIN) 5 MG tablet Take 1 tablet (5 mg total) by mouth Daily.    oxyCODONE-acetaminophen (PERCOCET) 5-325 mg per tablet Take 1 tablet by mouth daily as needed for Pain (with dialysis).     traMADol (ULTRAM) 50 mg tablet Take 1 tablet (50 mg total) by mouth 3 (three) times a week. 3 TIMES A WEEK AS NEEDED DURING HEMODIALYSIS    [DISCONTINUED] aspirin 81 MG Chew Take 81 mg by mouth once daily.     [DISCONTINUED] calcium acetate (PHOSLO) 667 mg capsule Take 1,334 mg by mouth 3 (three) times daily with meals.    [DISCONTINUED] fluticasone furoate-vilanterol (BREO) 100-25 mcg/dose diskus inhaler Inhale 1 puff into the lungs once daily. Controller    [DISCONTINUED] sevelamer HCl (RENAGEL) 800 MG Tab Take 1 tablet (800 mg total) by mouth 3 (three) times daily with meals.     Family History     Problem Relation (Age of Onset)    Arthritis Mother    Diabetes Mother, Father    Early death Sister, Sister    Heart disease Sister, Maternal Grandfather, Mother    Heart failure Mother    Hypertension Mother        Tobacco Use    Smoking status: Current Some Day Smoker     Packs/day: 0.50     Years: 45.00     Pack years: 22.50    Smokeless tobacco: Never Used   Substance and Sexual Activity    Alcohol use: No    Drug use: No    Sexual activity: Not on file     Review of Systems   Constitutional: Positive for activity change and fatigue.   HENT: Negative.    Eyes: Negative.    Respiratory: Positive for shortness of breath.    Cardiovascular: Positive for chest pain and palpitations.   Gastrointestinal: Negative.    Endocrine:  Negative.    Genitourinary: Negative.    Musculoskeletal: Negative.    Skin: Negative.    Allergic/Immunologic: Negative.    Neurological: Positive for weakness.   Hematological: Negative.    Psychiatric/Behavioral: Negative.      Objective:     Vital Signs (Most Recent):  Temp: 97.8 °F (36.6 °C) (02/02/20 1527)  Pulse: (!) 121 (02/02/20 1800)  Resp: (!) 23 (02/02/20 1800)  BP: 117/63 (02/02/20 1800)  SpO2: 100 % (02/02/20 1800) Vital Signs (24h Range):  Temp:  [97.8 °F (36.6 °C)] 97.8 °F (36.6 °C)  Pulse:  [114-141] 121  Resp:  [20-30] 23  SpO2:  [100 %] 100 %  BP: ()/(57-93) 117/63     Weight: 63.5 kg (140 lb)  Body mass index is 23.3 kg/m².    Physical Exam   Constitutional: She is oriented to person, place, and time.   Frail appearing female   HENT:   Head: Normocephalic.   Eyes: Pupils are equal, round, and reactive to light.   Neck: Normal range of motion. Neck supple. No thyromegaly present.   Cardiovascular: Normal heart sounds and intact distal pulses.   Irregular rate and rhythm.   Pulmonary/Chest: She has wheezes. She has rales.   Using accessory muscles to breath also has rhonchi throughout.   Abdominal: Soft. Bowel sounds are normal.   Musculoskeletal: Normal range of motion.   Neurological: She is alert and oriented to person, place, and time.   Skin: Skin is warm and dry. Capillary refill takes 2 to 3 seconds.   Has a wound to upper thigh, dressing clean dry and intact.   Psychiatric: She has a normal mood and affect.         CRANIAL NERVES     CN III, IV, VI   Pupils are equal, round, and reactive to light.       Significant Labs:   CBC:   Recent Labs   Lab 02/02/20  1536   WBC 5.57   HGB 10.3*   HCT 34.2*        CMP:   Recent Labs   Lab 02/02/20  1617      K 4.1   CL 94*   CO2 31*   GLU 81   BUN 34*   CREATININE 6.1*   CALCIUM 7.7*   PROT 7.8   ALBUMIN 2.9*   BILITOT 0.9   ALKPHOS 102   AST 28   ALT 12   ANIONGAP 15   EGFRNONAA 6.3*     Cardiac Markers:   Recent Labs   Lab  02/02/20  1617   BNP >4,500*       Significant Imaging:   Results for orders placed or performed during the hospital encounter of 02/02/20   EKG 12-lead    Collection Time: 02/02/20  3:36 PM    Narrative    Test Reason : R07.9,    Vent. Rate : 131 BPM     Atrial Rate : 115 BPM     P-R Int : 000 ms          QRS Dur : 090 ms      QT Int : 332 ms       P-R-T Axes : 000 -43 101 degrees     QTc Int : 490 ms    Atrial fibrillation with rapid ventricular response  Left axis deviation  Abnormal QRS-T angle, consider primary T wave abnormality  Abnormal ECG  When compared with ECG of 03-JAN-2020 00:07,  Nonspecific T wave abnormality no longer evident in Inferior leads  Confirmed by NICOLASA DUMNOT MD ([id number]),  Daysi Munoz ([id number]) on 2/2/2020  3:50:16 PM    Referred By: UGOERR   SELF           Confirmed By:MD NICOLASA DUMONT     Imaging Results          X-Ray Chest AP Portable (Final result)  Result time 02/02/20 16:19:14    Final result by Nga Porter MD (02/02/20 16:19:14)                 Narrative:    PROCEDURE:   XR CHEST AP PORTABLE  dated  2/2/2020 3:54 PM    CLINICAL HISTORY:   Female 74 years of age.   Chest Pain    TECHNIQUE: AP view of the chest obtained portably at 3:54 PM.    PREVIOUS STUDIES:  January 4, 2020    FINDINGS:    Moderate cardiomegaly is stable. There is new interstitial and  probably mild alveolar pulmonary edema. Small right-sided pleural  effusion is unchanged.      IMPRESSION:      1. Stable moderate cardiomegaly.  2. New pulmonary edema.  3. Stable right-sided pleural effusion.    Electronically Signed by Nga Porter on 2/2/2020 5:06 PM

## 2020-02-04 LAB
ANION GAP SERPL CALC-SCNC: 16 MMOL/L (ref 8–16)
BNP SERPL-MCNC: >4500 PG/ML (ref 0–99)
BUN SERPL-MCNC: 42 MG/DL (ref 8–23)
CALCIUM SERPL-MCNC: 8 MG/DL (ref 8.7–10.5)
CHLORIDE SERPL-SCNC: 97 MMOL/L (ref 95–110)
CO2 SERPL-SCNC: 22 MMOL/L (ref 23–29)
CREAT SERPL-MCNC: 5 MG/DL (ref 0.5–1.4)
ERYTHROCYTE [DISTWIDTH] IN BLOOD BY AUTOMATED COUNT: 17.7 % (ref 11.5–14.5)
EST. GFR  (AFRICAN AMERICAN): 9.2 ML/MIN/1.73 M^2
EST. GFR  (NON AFRICAN AMERICAN): 8 ML/MIN/1.73 M^2
GLUCOSE SERPL-MCNC: 117 MG/DL (ref 70–110)
HBV SURFACE AG SERPL QL IA: NEGATIVE
HCT VFR BLD AUTO: 32 % (ref 37–48.5)
HGB BLD-MCNC: 9.7 G/DL (ref 12–16)
INR PPP: 2.9
INR PPP: 2.9
MCH RBC QN AUTO: 27.7 PG (ref 27–31)
MCHC RBC AUTO-ENTMCNC: 30.3 G/DL (ref 32–36)
MCV RBC AUTO: 91 FL (ref 82–98)
PLATELET # BLD AUTO: 279 K/UL (ref 150–350)
PMV BLD AUTO: 10.5 FL (ref 9.2–12.9)
POTASSIUM SERPL-SCNC: 5.4 MMOL/L (ref 3.5–5.1)
PROTHROMBIN TIME: 29.5 SEC (ref 10.6–14.8)
PROTHROMBIN TIME: 29.5 SEC (ref 10.6–14.8)
RBC # BLD AUTO: 3.5 M/UL (ref 4–5.4)
SODIUM SERPL-SCNC: 135 MMOL/L (ref 136–145)
WBC # BLD AUTO: 12.54 K/UL (ref 3.9–12.7)

## 2020-02-04 PROCEDURE — 85027 COMPLETE CBC AUTOMATED: CPT

## 2020-02-04 PROCEDURE — 25000003 PHARM REV CODE 250: Performed by: NURSE PRACTITIONER

## 2020-02-04 PROCEDURE — 27000221 HC OXYGEN, UP TO 24 HOURS

## 2020-02-04 PROCEDURE — 36415 COLL VENOUS BLD VENIPUNCTURE: CPT

## 2020-02-04 PROCEDURE — 85610 PROTHROMBIN TIME: CPT

## 2020-02-04 PROCEDURE — 25000242 PHARM REV CODE 250 ALT 637 W/ HCPCS: Performed by: NURSE PRACTITIONER

## 2020-02-04 PROCEDURE — 80048 BASIC METABOLIC PNL TOTAL CA: CPT

## 2020-02-04 PROCEDURE — 99900035 HC TECH TIME PER 15 MIN (STAT)

## 2020-02-04 PROCEDURE — G0378 HOSPITAL OBSERVATION PER HR: HCPCS

## 2020-02-04 PROCEDURE — 94640 AIRWAY INHALATION TREATMENT: CPT

## 2020-02-04 PROCEDURE — 94761 N-INVAS EAR/PLS OXIMETRY MLT: CPT

## 2020-02-04 PROCEDURE — 90935 HEMODIALYSIS ONE EVALUATION: CPT

## 2020-02-04 PROCEDURE — 25000003 PHARM REV CODE 250: Performed by: INTERNAL MEDICINE

## 2020-02-04 PROCEDURE — 83880 ASSAY OF NATRIURETIC PEPTIDE: CPT

## 2020-02-04 PROCEDURE — 25000003 PHARM REV CODE 250

## 2020-02-04 RX ORDER — SODIUM CHLORIDE 9 MG/ML
INJECTION, SOLUTION INTRAVENOUS ONCE
Status: DISCONTINUED | OUTPATIENT
Start: 2020-02-04 | End: 2020-02-05 | Stop reason: HOSPADM

## 2020-02-04 RX ORDER — WARFARIN SODIUM 5 MG/1
5 TABLET ORAL DAILY
Status: DISCONTINUED | OUTPATIENT
Start: 2020-02-04 | End: 2020-02-04

## 2020-02-04 RX ORDER — FLUTICASONE FUROATE AND VILANTEROL 100; 25 UG/1; UG/1
1 POWDER RESPIRATORY (INHALATION) DAILY
Status: DISCONTINUED | OUTPATIENT
Start: 2020-02-04 | End: 2020-02-05 | Stop reason: HOSPADM

## 2020-02-04 RX ORDER — SODIUM CHLORIDE 9 MG/ML
INJECTION, SOLUTION INTRAVENOUS
Status: DISCONTINUED | OUTPATIENT
Start: 2020-02-04 | End: 2020-02-05 | Stop reason: HOSPADM

## 2020-02-04 RX ORDER — WARFARIN 2.5 MG/1
2.5 TABLET ORAL DAILY
Status: DISCONTINUED | OUTPATIENT
Start: 2020-02-05 | End: 2020-02-05 | Stop reason: HOSPADM

## 2020-02-04 RX ADMIN — DILTIAZEM HYDROCHLORIDE 180 MG: 180 CAPSULE, COATED, EXTENDED RELEASE ORAL at 08:02

## 2020-02-04 RX ADMIN — MUPIROCIN: 20 OINTMENT TOPICAL at 08:02

## 2020-02-04 RX ADMIN — MAGNESIUM OXIDE 400 MG: 400 TABLET ORAL at 08:02

## 2020-02-04 RX ADMIN — HYDROCODONE BITARTRATE AND ACETAMINOPHEN 1 TABLET: 10; 325 TABLET ORAL at 06:02

## 2020-02-04 RX ADMIN — METOPROLOL SUCCINATE 50 MG: 50 TABLET, EXTENDED RELEASE ORAL at 08:02

## 2020-02-04 RX ADMIN — IPRATROPIUM BROMIDE AND ALBUTEROL SULFATE 3 ML: .5; 3 SOLUTION RESPIRATORY (INHALATION) at 01:02

## 2020-02-04 RX ADMIN — CHLORHEXIDINE GLUCONATE 15 ML: 1.2 RINSE ORAL at 08:02

## 2020-02-04 RX ADMIN — ONDANSETRON 4 MG: 4 TABLET, ORALLY DISINTEGRATING ORAL at 03:02

## 2020-02-04 RX ADMIN — IPRATROPIUM BROMIDE AND ALBUTEROL SULFATE 3 ML: .5; 3 SOLUTION RESPIRATORY (INHALATION) at 07:02

## 2020-02-04 RX ADMIN — IPRATROPIUM BROMIDE AND ALBUTEROL SULFATE 3 ML: .5; 3 SOLUTION RESPIRATORY (INHALATION) at 08:02

## 2020-02-04 RX ADMIN — HYDROCODONE BITARTRATE AND ACETAMINOPHEN 1 TABLET: 10; 325 TABLET ORAL at 11:02

## 2020-02-04 RX ADMIN — WARFARIN SODIUM 5 MG: 5 TABLET ORAL at 07:02

## 2020-02-04 RX ADMIN — HYDROCODONE BITARTRATE AND ACETAMINOPHEN 1 TABLET: 10; 325 TABLET ORAL at 07:02

## 2020-02-04 RX ADMIN — MUPIROCIN: 20 OINTMENT TOPICAL at 07:02

## 2020-02-04 NOTE — NURSING
Pt. Felling short of breath and pain in her left leg while sitting in chair. Placed pt. In bed with hob elevated and left leg elevated on pillow. Notified Dr. Thomas

## 2020-02-04 NOTE — PROGRESS NOTES
[facility]  Cardiology  Progress Note    Patient Name: Griselda Neely  MRN: [id number]  Admission Date: 2/2/2020  Hospital Length of Stay: 0 days  Code Status: Full Code   Attending Physician: No att. providers found   Primary Care Physician: Kalie Neely MD  Expected Discharge Date: 2/5/2020  Principal Problem:Atrial fibrillation with RVR    Subjective:       Interval History: Reports shortness of breath and pain in the lower extremity. She also has pain in the left antecubital fossa.     ROS   Denies any chest pain.  Denies any abdominal pain.   Objective:     Vital Signs (Most Recent):  Temp: 97.8 °F (36.6 °C) (02/04/20 0759)  Pulse: 66 (02/04/20 0759)  Resp: (!) 24 (02/04/20 0759)  BP: (!) 154/70 (02/04/20 0759)  SpO2: 100 % (02/04/20 0759) Vital Signs (24h Range):  Temp:  [96.9 °F (36.1 °C)-98 °F (36.7 °C)] 97.8 °F (36.6 °C)  Pulse:  [] 66  Resp:  [16-32] 24  SpO2:  [86 %-100 %] 100 %  BP: ()/(46-88) 154/70     Weight: 63.1 kg (139 lb 1.8 oz)  Body mass index is 23.15 kg/m².    SpO2: 100 %  O2 Device (Oxygen Therapy): nasal cannula      Intake/Output Summary (Last 24 hours) at 2/4/2020 0850  Last data filed at 2/3/2020 1500  Gross per 24 hour   Intake 950 ml   Output --   Net 950 ml       Lines/Drains/Airways     Drain                 Hemodialysis AV Fistula 08/08/19 0214 Right upper arm 180 days          Peripheral Intravenous Line                 Peripheral IV - Single Lumen 02/02/20 1549 20 G Left Forearm 1 day                Scheduled Meds:   sodium chloride 0.9%   Intravenous Once    albuterol-ipratropium  3 mL Nebulization Q6H WAKE    chlorhexidine  15 mL Mouth/Throat BID    diltiaZEM  180 mg Oral Daily    magnesium oxide  400 mg Oral Daily    metoprolol succinate  50 mg Oral Daily    mupirocin   Nasal BID     Continuous Infusions:  PRN Meds:.sodium chloride 0.9%, HYDROcodone-acetaminophen, ondansetron, sodium chloride 0.9%     Physical Exam  HEENT: Normocephalic,  atraumatic, PERRL, Conjunctiva pink, no scleral icterus.   CVS: S1S2+, Irregular, SM+, no rubs or gallops, JVP: Normal.  LUNGS: Crackles+  ABDOMEN: Soft, NT, BS+  EXTREMITIES: No cyanosis, clubbing or edema in the lower extremities. +Hematoma in the left antecubital fossa.   NEURO: AAO X 3.       Significant Labs:   BMP:   Recent Labs   Lab 02/02/20 1617 02/03/20 0227 02/04/20  0605   GLU 81 158* 117*    137 135*   K 4.1 4.7 5.4*   CL 94* 100 97   CO2 31* 25 22*   BUN 34* 16 42*   CREATININE 6.1* 3.5* 5.0*   CALCIUM 7.7* 7.9* 8.0*   MG 1.8  --   --    , CMP   Recent Labs   Lab 02/02/20 1617 02/03/20 0227 02/04/20  0605    137 135*   K 4.1 4.7 5.4*   CL 94* 100 97   CO2 31* 25 22*   GLU 81 158* 117*   BUN 34* 16 42*   CREATININE 6.1* 3.5* 5.0*   CALCIUM 7.7* 7.9* 8.0*   PROT 7.8  --   --    ALBUMIN 2.9*  --   --    BILITOT 0.9  --   --    ALKPHOS 102  --   --    AST 28  --   --    ALT 12  --   --    ANIONGAP 15 12 16   ESTGFRAFRICA 7.3* 14.2* 9.2*   EGFRNONAA 6.3* 12.3* 8.0*   , CBC   Recent Labs   Lab 02/02/20  1536 02/04/20  0605   WBC 5.57 12.54   HGB 10.3* 9.7*   HCT 34.2* 32.0*    279   , INR   Recent Labs   Lab 02/02/20  1617 02/03/20 0227 02/04/20  0605   INR 3.6 3.8 2.9  2.9   , Lipid Panel No results for input(s): CHOL, HDL, LDLCALC, TRIG, CHOLHDL in the last 48 hours. and Troponin   Recent Labs   Lab 02/02/20  1617 02/02/20  2008 02/03/20  0227   TROPONINI 0.191* 0.175* 0.116*       Significant Imaging: Reviewed  Assessment and Plan:     IMPRESSION:  Chest pain. Troponin mildly elevated (chronic). ECG Without dynamic changes. Not consistent with ACS.   Shortness of breath. Pulmonary edema noted on CXR- seems improved after HD last night.   Large left atrial mass. Likely thrombus. TRUPTI with large thrombus in the roof of the left atrium as well as in the YOUNG. CT Abdomen with left atrial filling defect. On Coumadin. INR supratherapeutic at 3.8. Holding Coumadin tonight.   Congestive  heart failure. Chronic. LVEF ~25% on last echo. Chronically elevated BNP.   COPD.  Calciphlyaxis.   Atrial fibrillation. Valvular. Persistent. RVR has resolved after short course of IV Cardizem.   Hypertension. Better controlled at present. Elevates with agitation. Periods of hypotension.    s/p MVR via right thoracotomy approach on 3/18/19 with Dr. Lora. Functioning appropriately per last echo.   CAD. Nonobstructive on [facility] done on 3/13/2019.   End-stage renal disease on hemodialysis MWF. Anuric. Followed by Dr. Ingram.   Chronic tobacco abuse.  Left antecubital fossa hematoma from previous IV.     PLAN:  1. Resume Coumadin at 2.5 mg daily.   2. Patient would need HD with fluid removal in my opinion. Will discuss with Nephrology.   3. Continue current regimen for now.   4. She is not a candidate for cardioversion at this point in time due to large LA/YOUNG thrombus.         Katharina Powell MD  Cardiology  [facility]

## 2020-02-04 NOTE — SUBJECTIVE & OBJECTIVE
Interval History:   Patient reports shortness of breath at rest  Noted to be wheezing on physical exam with rales  Scheduled for hemodialysis today, continue with due  Denies any chest pain, no lower extremity edema  INR trending down 2.9, will resume warfarin    Review of Systems   10 point review of systems were found to be negative expect for that mentioned already in interval history.   Objective:     Vital Signs (Most Recent):  Temp: 98.3 °F (36.8 °C) (02/04/20 1538)  Pulse: 85 (02/04/20 1538)  Resp: 20 (02/04/20 1538)  BP: (!) 186/73 (02/04/20 1538)  SpO2: 99 % (02/04/20 1538) Vital Signs (24h Range):  Temp:  [97.4 °F (36.3 °C)-98.3 °F (36.8 °C)] 98.3 °F (36.8 °C)  Pulse:  [65-89] 85  Resp:  [16-24] 20  SpO2:  [95 %-100 %] 99 %  BP: (130-186)/(70-88) 186/73     Weight: 63.1 kg (139 lb 1.8 oz)  Body mass index is 23.15 kg/m².    Intake/Output Summary (Last 24 hours) at 2/4/2020 1657  Last data filed at 2/4/2020 1230  Gross per 24 hour   Intake 480 ml   Output --   Net 480 ml      Physical Exam   Constitutional: She is oriented to person, place, and time. No distress.   HENT:   Right Ear: External ear normal.   Left Ear: External ear normal.   Eyes: Pupils are equal, round, and reactive to light. EOM are normal.   Neck: Neck supple.   Cardiovascular: Normal rate.   Irregular rhythm   Pulmonary/Chest: Effort normal and breath sounds normal.   Reduced air entry to bases  Bibasilar minimal crackles   Abdominal: Soft. Bowel sounds are normal.   Musculoskeletal: Normal range of motion. She exhibits no edema.   Neurological: She is alert and oriented to person, place, and time.   Skin: Skin is warm.   Calciphylaxis   Psychiatric: She has a normal mood and affect.   Nursing note and vitals reviewed.      Significant Labs:   BMP:   Recent Labs   Lab 02/04/20  0605   *   *   K 5.4*   CL 97   CO2 22*   BUN 42*   CREATININE 5.0*   CALCIUM 8.0*     CBC:   Recent Labs   Lab 02/04/20  0605   WBC 12.54   HGB 9.7*    HCT 32.0*        Cardiac Markers:   Recent Labs   Lab 02/04/20  0605   BNP >4,500*     Coagulation:   Recent Labs   Lab 02/04/20  0605   INR 2.9  2.9       Significant Imaging: I have reviewed and interpreted all pertinent imaging results/findings within the past 24 hours.

## 2020-02-04 NOTE — PLAN OF CARE
Pt. Free from injury at this time, will continue to monitor. Pt. Pain controlled with prn pain med's will continue to monitor. Safety precautions in place and call light left in reach.       Problem: Wound  Goal: Optimal Wound Healing  Outcome: Ongoing, Progressing  Intervention: Promote Effective Wound Healing  Flowsheets (Taken 2/4/2020 1752)  Oral Nutrition Promotion: calorie dense foods provided  Pain Management Interventions: pain management plan reviewed with patient/caregiver; relaxation techniques promoted; pillow support provided     Problem: Adult Inpatient Plan of Care  Goal: Absence of Hospital-Acquired Illness or Injury  Outcome: Ongoing, Progressing  Intervention: Prevent VTE (venous thromboembolism)  Flowsheets (Taken 2/4/2020 1752)  VTE Prevention/Management: bleeding risk assessed     Problem: Hemodynamic Instability (Hemodialysis)  Goal: Vital Signs Remain in Desired Range  Outcome: Ongoing, Progressing  Intervention: Optimize Blood Flow  Flowsheets (Taken 2/4/2020 1752)  Medication Review/Management: medications reviewed     Problem: Fall Injury Risk  Goal: Absence of Fall and Fall-Related Injury  Outcome: Ongoing, Progressing  Intervention: Promote Injury-Free Environment  Flowsheets (Taken 2/4/2020 1752)  Safety Promotion/Fall Prevention: assistive device/personal item within reach; bed alarm set; Fall Risk reviewed with patient/family; medications reviewed; nonskid shoes/socks when out of bed; side rails raised x 2; instructed to call staff for mobility  Environmental Safety Modification: assistive device/personal items within reach; clutter free environment maintained; room near unit station; room organization consistent     Problem: Skin Injury Risk Increased  Goal: Skin Health and Integrity  Outcome: Ongoing, Progressing  Intervention: Promote and Optimize Oral Intake  Flowsheets (Taken 2/4/2020 1752)  Oral Nutrition Promotion: calorie dense foods provided

## 2020-02-04 NOTE — PLAN OF CARE
02/03/20 2005   Patient Assessment/Suction   Level of Consciousness (AVPU) alert   Respiratory Effort Normal;Unlabored   Expansion/Accessory Muscles/Retractions no use of accessory muscles   All Lung Fields Breath Sounds Lateral:;Posterior:;Anterior:   STEPHANIE Breath Sounds clear   LLL Breath Sounds diminished   RUL Breath Sounds clear   RML Breath Sounds diminished   RLL Breath Sounds diminished   Rhythm/Pattern, Respiratory pattern regular;unlabored   Cough Frequency infrequent   Cough Type nonproductive   PRE-TX-O2   O2 Device (Oxygen Therapy) nasal cannula   Flow (L/min) 3   SpO2 98 %   Pulse Oximetry Type Continuous   Pulse 82   Resp 16   Aerosol Therapy   $ Aerosol Therapy Charges Aerosol Treatment   Daily Review of Necessity (SVN) completed   Respiratory Treatment Status (SVN) given   Treatment Route (SVN) mask   Patient Position (SVN) HOB elevated   Post Treatment Assessment (SVN) breath sounds unchanged   Signs of Intolerance (SVN) none   Breath Sounds Post-Respiratory Treatment   Throughout All Fields Post-Treatment All Fields   Throughout All Fields Post-Treatment no change   Post-treatment Heart Rate (beats/min) 83   Post-treatment Resp Rate (breaths/min) 15   Equipment Change   $ RT Equipment Treatment nebuilzer;Aerosol treatment mask

## 2020-02-04 NOTE — PLAN OF CARE
Plan of care reviewed with pt along with importance of bed alarm and fall precautions.  Problem: Wound  Goal: Optimal Wound Healing  Outcome: Ongoing, Progressing     Problem: Adult Inpatient Plan of Care  Goal: Plan of Care Review  Outcome: Ongoing, Progressing  Goal: Patient-Specific Goal (Individualization)  Outcome: Ongoing, Progressing  Goal: Absence of Hospital-Acquired Illness or Injury  Outcome: Ongoing, Progressing  Goal: Optimal Comfort and Wellbeing  Outcome: Ongoing, Progressing  Goal: Readiness for Transition of Care  Outcome: Ongoing, Progressing  Goal: Rounds/Family Conference  Outcome: Ongoing, Progressing     Problem: Device-Related Complication Risk (Hemodialysis)  Goal: Safe, Effective Therapy Delivery  Outcome: Ongoing, Progressing     Problem: Hemodynamic Instability (Hemodialysis)  Goal: Vital Signs Remain in Desired Range  Outcome: Ongoing, Progressing     Problem: Infection (Hemodialysis)  Goal: Absence of Infection Signs/Symptoms  Outcome: Ongoing, Progressing     Problem: Fall Injury Risk  Goal: Absence of Fall and Fall-Related Injury  Outcome: Ongoing, Progressing     Problem: Skin Injury Risk Increased  Goal: Skin Health and Integrity  Outcome: Ongoing, Progressing

## 2020-02-04 NOTE — PLAN OF CARE
02/04/20 0747   Patient Assessment/Suction   Level of Consciousness (AVPU) alert   Respiratory Effort Normal;Unlabored   Expansion/Accessory Muscles/Retractions expansion symmetric;no use of accessory muscles   All Lung Fields Breath Sounds clear;equal bilaterally   Cough Frequency infrequent   Cough Type nonproductive   PRE-TX-O2   O2 Device (Oxygen Therapy) nasal cannula   $ Is the patient on Low Flow Oxygen? Yes   Flow (L/min) 3  (decreased to 2L)   SpO2 100 %   Pulse Oximetry Type Intermittent   $ Pulse Oximetry - Multiple Charge Pulse Oximetry - Multiple   Pulse 65   Resp 18   Positioning HOB elevated 45 degrees   Aerosol Therapy   $ Aerosol Therapy Charges Aerosol Treatment   Daily Review of Necessity (SVN) completed   Respiratory Treatment Status (SVN) given   Treatment Route (SVN) mask;oxygen   Patient Position (SVN) HOB elevated   Post Treatment Assessment (SVN) breath sounds unchanged   Signs of Intolerance (SVN) none   Breath Sounds Post-Respiratory Treatment   Throughout All Fields Post-Treatment All Fields   Throughout All Fields Post-Treatment no change   Post-treatment Heart Rate (beats/min) 65   Post-treatment Resp Rate (breaths/min) 18

## 2020-02-04 NOTE — PROGRESS NOTES
[facility] Medicine  Progress Note    Patient Name: Griselda Neely  MRN: [id number]  Patient Class: OP- Observation   Admission Date: 2/2/2020  Length of Stay: 0 days  Attending Physician: Cierra Thomas MD  Primary Care Provider: Kalie Neely MD        Subjective:     Principal Problem:Atrial fibrillation with RVR        HPI:  Ms Neely is a 73 yo AAF that presented to the ED with co chest pain and was found to be in AF with RVR. She was started on Cardizem IV to slow her rate down and then she became hypotensive. She was then given fluid bolus. She remains on Cardizem IV. Her cardiologist is Dr Frazier. Describes the pain as substernal chest tightness that started when she was at [employer], she then felt palpitations and SOB.  She has a history of COPD and is on chronic oxygen at 3 L via [place]. She was SOB and she was placed on BiPAP. Continues to use cigarettes daily.  Also has a history of ESRD and diaylsis days are M/W/F and follows with Dr Morocho. She is anuric.  Past medical history includes: Left atrial thrombus, CHF, COPD, Persistant AF, HTN, CAD, ESRD, Tobacco abuse.    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   Objective:     Vital Signs (Most Recent):  Temp: 98.3 °F (36.8 °C) (02/04/20 1538)  Pulse: 85 (02/04/20 1538)  Resp: 20 (02/04/20 1538)  BP: (!) 186/73 (02/04/20 1538)  SpO2: 99 % (02/04/20 1538) Vital Signs (24h Range):  Temp:  [97.4 °F  (36.3 °C)-98.3 °F (36.8 °C)] 98.3 °F (36.8 °C)  Pulse:  [65-89] 85  Resp:  [16-24] 20  SpO2:  [95 %-100 %] 99 %  BP: (130-186)/(70-88) 186/73     Weight: 63.1 kg (139 lb 1.8 oz)  Body mass index is 23.15 kg/m².    Intake/Output Summary (Last 24 hours) at 2/4/2020 1657  Last data filed at 2/4/2020 1230  Gross per 24 hour   Intake 480 ml   Output --   Net 480 ml      Physical Exam   Constitutional: She is oriented to person, place, and time. No distress.   HENT:   Right Ear: External ear normal.   Left Ear: External ear normal.   Eyes: Pupils are equal, round, and reactive to light. EOM are normal.   Neck: Neck supple.   Cardiovascular: Normal rate.   Irregular rhythm   Pulmonary/Chest: Effort normal and breath sounds normal.   Reduced air entry to bases  Bibasilar minimal crackles   Abdominal: Soft. Bowel sounds are normal.   Musculoskeletal: Normal range of motion. She exhibits no edema.   Neurological: She is alert and oriented to person, place, and time.   Skin: Skin is warm.   Calciphylaxis   Psychiatric: She has a normal mood and affect.   Nursing note and vitals reviewed.      Significant Labs:   BMP:   Recent Labs   Lab 02/04/20  0605   *   *   K 5.4*   CL 97   CO2 22*   BUN 42*   CREATININE 5.0*   CALCIUM 8.0*     CBC:   Recent Labs   Lab 02/04/20  0605   WBC 12.54   HGB 9.7*   HCT 32.0*        Cardiac Markers:   Recent Labs   Lab 02/04/20  0605   BNP >4,500*     Coagulation:   Recent Labs   Lab 02/04/20  0605   INR 2.9  2.9       Significant Imaging: I have reviewed and interpreted all pertinent imaging results/findings within the past 24 hours.      Assessment/Plan:      * Atrial fibrillation with RVR  Admitted with atrial fibrillation with RVR  Her rate is controlled after she was started on IV Cardizem  Currently on diltiazem and Toprol, rate controlled    Chronic systolic heart failure  Chronic systolic heart failure  On last echo ejection fraction was approximately 25%      Acute  pulmonary edema  Presented with acute pulmonary edema in the background of chronic systolic heart failure  Also fluid overload from end-stage renal disease  Patient is aneuric.  Patient will have dialysis per Nephrology MD      Chest pain  Patient admitted with chest pain in the background of acute pulmonary edema/fluid overload and atrial fibrillation with RVR  No evidence of acute coronary syndrome      Left atrial thrombus  Large left atrial mass most likely thrombus  Patient on Coumadin      PVD (peripheral vascular disease)  Patient has history of peripheral vascular disease  She continues to  abuse tobacco      Calciphylaxis  Chronic ongoing issue in the background of end-stage renal disease  Pain medications p.r.n. basis      ESRD (end stage renal disease) on HD M,W, F  Patient presented with acute pulmonary edema/fluid overload  Dialysis per Nephrology MD      Tobacco dependence  Tobacco cessation education provided  Patient continues to smoke      HTN (hypertension)  Blood pressure levels on low normal range  Monitor closely        VTE Risk Mitigation (From admission, onward)         Ordered     Reason for no Mechanical VTE Prophylaxis  Once     Question:  Reasons:  Answer:  Risk of Bleeding    02/02/20 1805     IP VTE HIGH RISK PATIENT  Once      02/02/20 1805                      Cierra Thomas MD  Department of Hospital Medicine   [facility]

## 2020-02-05 VITALS
WEIGHT: 134.06 LBS | HEIGHT: 65 IN | SYSTOLIC BLOOD PRESSURE: 140 MMHG | BODY MASS INDEX: 22.34 KG/M2 | HEART RATE: 84 BPM | RESPIRATION RATE: 16 BRPM | OXYGEN SATURATION: 99 % | TEMPERATURE: 98 F | DIASTOLIC BLOOD PRESSURE: 61 MMHG

## 2020-02-05 LAB
ANION GAP SERPL CALC-SCNC: 14 MMOL/L (ref 8–16)
BUN SERPL-MCNC: 38 MG/DL (ref 8–23)
CALCIUM SERPL-MCNC: 8.1 MG/DL (ref 8.7–10.5)
CHLORIDE SERPL-SCNC: 101 MMOL/L (ref 95–110)
CO2 SERPL-SCNC: 23 MMOL/L (ref 23–29)
CREAT SERPL-MCNC: 4.2 MG/DL (ref 0.5–1.4)
ERYTHROCYTE [DISTWIDTH] IN BLOOD BY AUTOMATED COUNT: 17.4 % (ref 11.5–14.5)
EST. GFR  (AFRICAN AMERICAN): 11.4 ML/MIN/1.73 M^2
EST. GFR  (NON AFRICAN AMERICAN): 9.9 ML/MIN/1.73 M^2
GLUCOSE SERPL-MCNC: 83 MG/DL (ref 70–110)
HCT VFR BLD AUTO: 30.6 % (ref 37–48.5)
HGB BLD-MCNC: 9.1 G/DL (ref 12–16)
INR PPP: 2.2
MCH RBC QN AUTO: 27.3 PG (ref 27–31)
MCHC RBC AUTO-ENTMCNC: 29.7 G/DL (ref 32–36)
MCV RBC AUTO: 92 FL (ref 82–98)
PLATELET # BLD AUTO: 247 K/UL (ref 150–350)
PMV BLD AUTO: 9.8 FL (ref 9.2–12.9)
POTASSIUM SERPL-SCNC: 4.8 MMOL/L (ref 3.5–5.1)
PROTHROMBIN TIME: 23.8 SEC (ref 10.6–14.8)
RBC # BLD AUTO: 3.33 M/UL (ref 4–5.4)
SODIUM SERPL-SCNC: 138 MMOL/L (ref 136–145)
WBC # BLD AUTO: 8.86 K/UL (ref 3.9–12.7)

## 2020-02-05 PROCEDURE — 36415 COLL VENOUS BLD VENIPUNCTURE: CPT

## 2020-02-05 PROCEDURE — 90935 HEMODIALYSIS ONE EVALUATION: CPT

## 2020-02-05 PROCEDURE — 25000003 PHARM REV CODE 250: Performed by: INTERNAL MEDICINE

## 2020-02-05 PROCEDURE — 85027 COMPLETE CBC AUTOMATED: CPT

## 2020-02-05 PROCEDURE — 27000221 HC OXYGEN, UP TO 24 HOURS

## 2020-02-05 PROCEDURE — 85610 PROTHROMBIN TIME: CPT

## 2020-02-05 PROCEDURE — 80048 BASIC METABOLIC PNL TOTAL CA: CPT

## 2020-02-05 PROCEDURE — 25000003 PHARM REV CODE 250: Performed by: NURSE PRACTITIONER

## 2020-02-05 PROCEDURE — G0378 HOSPITAL OBSERVATION PER HR: HCPCS

## 2020-02-05 PROCEDURE — 94640 AIRWAY INHALATION TREATMENT: CPT

## 2020-02-05 PROCEDURE — 94761 N-INVAS EAR/PLS OXIMETRY MLT: CPT

## 2020-02-05 PROCEDURE — 25000242 PHARM REV CODE 250 ALT 637 W/ HCPCS: Mod: GZ | Performed by: INTERNAL MEDICINE

## 2020-02-05 PROCEDURE — 99900035 HC TECH TIME PER 15 MIN (STAT)

## 2020-02-05 PROCEDURE — 25000242 PHARM REV CODE 250 ALT 637 W/ HCPCS: Performed by: NURSE PRACTITIONER

## 2020-02-05 RX ORDER — SODIUM CHLORIDE 9 MG/ML
INJECTION, SOLUTION INTRAVENOUS
Status: CANCELLED | OUTPATIENT
Start: 2020-02-05

## 2020-02-05 RX ORDER — WARFARIN 2.5 MG/1
2.5 TABLET ORAL DAILY
Qty: 30 TABLET | Refills: 1 | Status: ON HOLD | OUTPATIENT
Start: 2020-02-05 | End: 2020-03-19 | Stop reason: SDUPTHER

## 2020-02-05 RX ORDER — SODIUM CHLORIDE 9 MG/ML
INJECTION, SOLUTION INTRAVENOUS ONCE
Status: CANCELLED | OUTPATIENT
Start: 2020-02-05 | End: 2020-02-05

## 2020-02-05 RX ADMIN — HYDROCODONE BITARTRATE AND ACETAMINOPHEN 1 TABLET: 10; 325 TABLET ORAL at 06:02

## 2020-02-05 RX ADMIN — MUPIROCIN: 20 OINTMENT TOPICAL at 09:02

## 2020-02-05 RX ADMIN — METOPROLOL SUCCINATE 50 MG: 50 TABLET, EXTENDED RELEASE ORAL at 09:02

## 2020-02-05 RX ADMIN — MAGNESIUM OXIDE 400 MG: 400 TABLET ORAL at 09:02

## 2020-02-05 RX ADMIN — DILTIAZEM HYDROCHLORIDE 180 MG: 180 CAPSULE, COATED, EXTENDED RELEASE ORAL at 09:02

## 2020-02-05 RX ADMIN — FLUTICASONE FUROATE AND VILANTEROL TRIFENATATE 1 PUFF: 100; 25 POWDER RESPIRATORY (INHALATION) at 07:02

## 2020-02-05 RX ADMIN — IPRATROPIUM BROMIDE AND ALBUTEROL SULFATE 3 ML: .5; 3 SOLUTION RESPIRATORY (INHALATION) at 07:02

## 2020-02-05 NOTE — NURSING
Pt. Dc home via wheelchair to sisters private vehicle. Pt. Has all personal belongings. Pt. VSS. Dc tele and IV.

## 2020-02-05 NOTE — CONSULTS
Consult Note  Nephrology    Griselda Neely  female  74 y.o.  Consult Requested By: Cierra Thomas MD  Reason for Consult:  ESRD    SUBJECTIVE:     History of Present Illness:  Mrs. Griselda Neely is a 74-year-old woman with multiple medical problems including coronary artery disease, CHF and ESRD for which we follow her.  She came into the hospital a few days ago with chest pain.  She sat found to have atrial fib with RVR.  She was treated with Cardizem drip and given a fluid bolus with improvement.  She also improve when she received dialysis.  She now feels good and the plan is to discharge her after dialysis.  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  That has been improving.  Nephrology has been consulted because of her ESRD.    Review Of Systems:  GEN:  No fever, chills, night sweats, decreased intake  HEENT: No blurry vision, glasses, floaters, hearing defects, sore throat  CV:  CP, SOB, ARGUELLO. No palpitations, edema.  PULM:  No Cough, hemoptysis, wheezing   GI:  No nausea, vomiting, constipation, diarrhea, melena, hematochezia, abdominal pain  :  No dysuria, urgency, frequency, cloudy urine, red urine, dribbling, double voiding, incontinence, hx of stones  NEURO: No LOC, seizure activity, dizziness  SKIN:  Calciphylaxis left leg.  MS:  No arthralgias, joint swelling, redness or warmth    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    both legs    CHF (congestive heart failure)     Encounter for blood transfusion 03/2016    Gout     Hypertension     Mitral valve regurgitation     Osteoarthritis     Pancreatitis     Peripheral vascular disease     Peritoneal dialysis catheter in place     Pneumonia 09/09/2017    Renal failure      Past Surgical History:   Procedure Laterality Date    ACHILLES TENDON SURGERY Right     ANGIOGRAPHY OF ARTERIOVENOUS SHUNT Right 1/7/2020    Procedure: Fistulogram with Possible  Intervention;  Surgeon: Joseph Loyola MD;  Location: [facility] CATH/EP LAB;  Service: Cardiology;  Laterality: Right;    APPENDECTOMY      CARDIAC CATHETERIZATION  07/03/2017    CHOLECYSTECTOMY      heal surgery Right     HYSTERECTOMY      INSERTION OF STENT INTO PERIPHERAL VESSEL N/A 1/7/2020    Procedure: INSERTION, STENT, VESSEL, PERIPHERAL;  Surgeon: Joseph Loyola MD;  Location: [facility] CATH/EP LAB;  Service: Cardiology;  Laterality: N/A;    PARATHYROIDECTOMY      PARATHYROIDECTOMY  07/13/2017    PERCUTANEOUS TRANSLUMINAL ANGIOPLASTY OF ARTERIOVENOUS FISTULA N/A 1/7/2020    Procedure: PTA, AV FISTULA;  Surgeon: Joseph Loyola MD;  Location: [facility] CATH/EP LAB;  Service: Cardiology;  Laterality: N/A;    PERITONEAL CATHETER INSERTION      RENAL BIOPSY      vocal cord nodule       Family History   Problem Relation Age of Onset    Heart disease Sister     Early death Sister         heart as baby    Heart disease Maternal Grandfather     Diabetes Mother     Hypertension Mother     Heart failure Mother     Heart disease Mother     Arthritis Mother     Diabetes Father     Early death Sister         infant     Social History     Tobacco Use    Smoking status: Current Some Day Smoker     Packs/day: 0.50     Years: 45.00     Pack years: 22.50    Smokeless tobacco: Never Used   Substance Use Topics    Alcohol use: No    Drug use: No      Review of patient's allergies indicates:  No Known Allergies   Prior to Admission medications    Medication Sig Start Date End Date Taking? Authorizing Provider   diltiaZEM (CARDIZEM CD) 180 MG 24 hr capsule Take 180 mg by mouth once daily.   Yes Historical Provider, MD   HYDROcodone-acetaminophen (NORCO) 7.5-325 mg per tablet Take 1 tablet by mouth every 6 (six) hours as needed for Pain.   Yes Historical Provider, MD   isosorbide mononitrate (IMDUR) 30 MG 24 hr tablet Take 30 mg by mouth once daily.   Yes Historical Provider, MD   losartan (COZAAR) 25 MG tablet  Take 25 mg by mouth once daily.   Yes Historical Provider, MD   magnesium oxide (MAG-OX) 400 mg (241.3 mg magnesium) tablet Take 400 mg by mouth once daily.   Yes Historical Provider, MD   metoprolol succinate (TOPROL-XL) 50 MG 24 hr tablet Take 1 tablet (50 mg total) by mouth once daily.  Patient taking differently: Take 50 mg by mouth once daily.  8/10/19 8/9/20 Yes Daren Lea MD   ondansetron (ZOFRAN-ODT) 4 MG TbDL Take 1 tablet (4 mg total) by mouth every 6 (six) hours as needed. 10/3/19  Yes Kalie Neely MD   warfarin (COUMADIN) 5 MG tablet Take 1 tablet (5 mg total) by mouth Daily. 1/8/20 1/7/21 Yes Homer Carlson MD   oxyCODONE-acetaminophen (PERCOCET) 5-325 mg per tablet Take 1 tablet by mouth daily as needed for Pain (with dialysis).     Historical Provider, MD   traMADol (ULTRAM) 50 mg tablet Take 1 tablet (50 mg total) by mouth 3 (three) times a week. 3 TIMES A WEEK AS NEEDED DURING HEMODIALYSIS 10/4/19   Kalie Neely MD              sodium chloride 0.9%   Intravenous Once    sodium chloride 0.9%   Intravenous Once    albuterol-ipratropium  3 mL Nebulization Q6H WAKE    chlorhexidine  15 mL Mouth/Throat BID    diltiaZEM  180 mg Oral Daily    fluticasone furoate-vilanterol  1 puff Inhalation Daily    magnesium oxide  400 mg Oral Daily    metoprolol succinate  50 mg Oral Daily    mupirocin   Nasal BID    warfarin  2.5 mg Oral Daily     sodium chloride 0.9%, sodium chloride 0.9%, HYDROcodone-acetaminophen, ondansetron, sodium chloride 0.9%       OBJECTIVE:     Vital Signs (Most Recent)  Temp: 97.8 °F (36.6 °C) (02/05/20 1240)  Pulse: 74 (02/05/20 1430)  Resp: 16 (02/05/20 1240)  BP: (!) 148/55 (02/05/20 1430)  SpO2: 99 % (02/05/20 1141)    Vital Signs Range (Last 24H):  Temp:  [95.9 °F (35.5 °C)-98.3 °F (36.8 °C)]   Pulse:  [60-92]   Resp:  [16-22]   BP: (107-186)/(55-82)   SpO2:  [97 %-100 %]     Physical Exam:  General: On dialysis.  Awake and alert. In no  distress.  HEENT: No scleral icterus, MM moist.   NECK:  JVD. Supple,  No swelling, No masses.  CV:  Irregular rhythm without murmurs, rubs, gallops.  PULM:  Clear without crackles, rhonchi, wheezes.  ABD:  Soft, nl BS, NT, ND.  EXTR:  No edema, clubbing, cyanosis.   NEURO: Grossly Intact.  SKIN:  Bandaged lesion on left thigh.  MS:  No joint effusions.   PSYCH: Normal Mood.    Laboratory:  Recent Labs   Lab 02/03/20  0227 02/04/20  0605 02/05/20  0419    135* 138   K 4.7 5.4* 4.8    97 101   CO2 25 22* 23   BUN 16 42* 38*   CREATININE 3.5* 5.0* 4.2*   * 117* 83   CALCIUM 7.9* 8.0* 8.1*       Recent Labs   Lab 02/02/20  1536 02/04/20  0605 02/05/20  0419   WBC 5.57 12.54 8.86   HGB 10.3* 9.7* 9.1*   HCT 34.2* 32.0* 30.6*    279 247       Active Hospital Problems    Diagnosis  POA    *Atrial fibrillation with RVR [I48.91]  Yes    Chest pain [R07.9]  Unknown    Acute pulmonary edema [J81.0]  Unknown    Chronic systolic heart failure [I50.22]  Unknown    Left atrial thrombus [I51.3]  Yes    PVD (peripheral vascular disease) [I73.9]  Yes     Chronic    Calciphylaxis [E83.59]  Yes     Chronic    ESRD (end stage renal disease) on HD M,W, F [N18.6]  Yes     Chronic    Tobacco dependence [F17.200]  Yes     Chronic    HTN (hypertension) [I10]  Yes     Chronic      Resolved Hospital Problems   No resolved problems to display.       ASSESSMENT/PLAN:     1.  ESRD  Dialysis now in plan outpatient follow-up  Volume looks good    2.  AF with RVR  Rate controlled now  Defer to Cardiology    3.  Anemia  Near goal  Outpatient [facility]    4.  Calciphylaxis  Sodium thiosulfate with dialysis    Joseph Ingram M.D.

## 2020-02-05 NOTE — PLAN OF CARE
02/05/20 0720   Patient Assessment/Suction   Level of Consciousness (AVPU) alert   Respiratory Effort Normal;Unlabored   Expansion/Accessory Muscles/Retractions no use of accessory muscles;expansion symmetric;no retractions   All Lung Fields Breath Sounds diminished   Rhythm/Pattern, Respiratory pattern regular;depth regular;no shortness of breath reported   Cough Frequency infrequent   Cough Type good   PRE-TX-O2   O2 Device (Oxygen Therapy) nasal cannula   $ Is the patient on Low Flow Oxygen? Yes   Flow (L/min) 2   SpO2 97 %   Pulse Oximetry Type Continuous   $ Pulse Oximetry - Multiple Charge Pulse Oximetry - Multiple   Pulse 78   Resp 18   Aerosol Therapy   $ Aerosol Therapy Charges Aerosol Treatment   Daily Review of Necessity (SVN) completed   Respiratory Treatment Status (SVN) given   Treatment Route (SVN) mask   Patient Position (SVN) sitting in chair   Post Treatment Assessment (SVN) increased aeration   Signs of Intolerance (SVN) none   Inhaler   $ Inhaler Charges MDI (Metered Dose Inahler) Treatment;Mouth rinsed post treatment   Daily Review of Necessity (Inhaler) completed   Respiratory Treatment Status (Inhaler) given   Treatment Route (Inhaler) mouthpiece   Patient Position (Inhaler) sitting in chair   Post Treatment Assessment (Inhaler) increased aeration   Signs of Intolerance (Inhaler) none   Breath Sounds Post-Respiratory Treatment   Throughout All Fields Post-Treatment All Fields   Throughout All Fields Post-Treatment aeration increased   Post-treatment Heart Rate (beats/min) 88   Post-treatment Resp Rate (breaths/min) 19

## 2020-02-05 NOTE — NURSING
Has refused dressing change. States she is going home and we can't hold her anymore. Said she will go to outpt wound care tomorrow.

## 2020-02-05 NOTE — DISCHARGE SUMMARY
[facility] Medicine  Discharge Summary      Patient Name: Griselda Neely  MRN: [id number]  Admission Date: 2/2/2020  Hospital Length of Stay: 0 days  Discharge Date and Time: No discharge date for patient encounter.  Attending Physician: Cierra Thomas MD   Discharging Provider: Cierra Thomas MD  Primary Care Provider: Kalie Neely MD      HPI:   Ms Neely is a 75 yo AAF that presented to the ED with co chest pain and was found to be in AF with RVR. She was started on Cardizem IV to slow her rate down and then she became hypotensive. She was then given fluid bolus. She remains on Cardizem IV. Her cardiologist is Dr Frazier. Describes the pain as substernal chest tightness that started when she was at [employer], she then felt palpitations and SOB.  She has a history of COPD and is on chronic oxygen at 3 L via [place]. She was SOB and she was placed on BiPAP. Continues to use cigarettes daily.  Also has a history of ESRD and diaylsis days are M/W/F and follows with Dr Morocho. She is anuric.  Past medical history includes: Left atrial thrombus, CHF, COPD, Persistant AF, HTN, CAD, ESRD, Tobacco abuse.    * No surgery found *      Hospital Course:   Patient was managed inpatient at telemetry for the following medical problems,  Atrial fibrillation with RVR  Admitted with atrial fibrillation with RVR  Her rate is controlled after short course of  IV Cardizem  The candidate for cardioversion due to LV thrombus     Chest pain  Patient admitted with chest pain in the background of acute pulmonary edema/fluid overload and atrial fibrillation with RVR  No evidence of acute coronary syndrome   Symptoms  improved after dialysis     Acute pulmonary edema  Presented with acute pulmonary edema in the background of chronic systolic heart failure  Also fluid overload from end-stage renal disease  Patient is aneuric.  Patient will have dialysis per Nephrology MD        Left atrial thrombus  Large  left atrial mass most likely thrombus  Patient on Coumadin  INR was supratherapeutic, was adjusted to 2.5mg/5mg EOD        Chronic systolic heart failure  Chronic systolic heart failure  On last echo ejection fraction was approximately 25%        PVD (peripheral vascular disease)  Patient has history of peripheral vascular disease  She continues to  abuse tobacco        Calciphylaxis  Chronic ongoing issue in the background of end-stage renal disease  Pain medications p.r.n. basis        ESRD (end stage renal disease) on HD M,W, F  Patient presented with acute pulmonary edema/fluid overload  Dialysis per Nephrology MD        Tobacco dependence  Tobacco cessation education provided  Patient continues to smoke        HTN (hypertension)  Blood pressure levels on low normal range  Monitor closely    Physical exam on the day of discharge  Physical Exam  HEENT: Normocephalic, atraumatic, PERRL, Conjunctiva pink, no scleral icterus.   CVS: S1S2+, Irregular, SM+, no rubs or gallops, JVP: Normal.  LUNGS:  mild rales bilateral improved from admission  ABDOMEN: Soft, NT, BS+  EXTREMITIES: No cyanosis, clubbing or edema in the lower extremities. +Hematoma in the left antecubital fossa.   NEURO: AAO X 3.         Advised to f/u INR on friday, had HD on Tuesday and Wednesday , can return to her regular scheduled for HD Friday.    Consults:   Consults (From admission, onward)        Status Ordering Provider     Cardiology  Once     Provider:  Katharina Powell MD    Acknowledged ELMO LEACH     Inpatient consult to Cardiology  Once     Provider:  Katharina Powell MD    Completed JUWAN BASILIO     Nephrology  Once     Provider:  Joseph Ingram MD    Completed JEMAL LUX          No new Assessment & Plan notes have been filed under this hospital service since the last note was generated.  Service: Hospital Medicine    Final Active Diagnoses:    Diagnosis Date Noted POA    PRINCIPAL PROBLEM:  Atrial  fibrillation with RVR [I48.91] 02/02/2020 Yes    Chest pain [R07.9] 02/03/2020 Unknown    Acute pulmonary edema [J81.0] 02/03/2020 Unknown    Chronic systolic heart failure [I50.22] 02/03/2020 Unknown    Left atrial thrombus [I51.3] 01/03/2020 Yes    PVD (peripheral vascular disease) [I73.9] 12/20/2019 Yes     Chronic    Calciphylaxis [E83.59] 07/18/2017 Yes     Chronic    ESRD (end stage renal disease) on HD M,W, F [N18.6] 05/11/2016 Yes     Chronic    Tobacco dependence [F17.200] 09/09/2013 Yes     Chronic    HTN (hypertension) [I10] 08/15/2013 Yes     Chronic      Problems Resolved During this Admission:       Discharged Condition: stable    Disposition: Home or Self Care    Follow Up:  Follow-up Information     Katharina Powell MD In 1 week.    Specialties:  Cardiovascular Disease, Cardiology  Contact information:  [address]  [address]  [place] [address]  [phone]             Kalie Neely MD.    Specialty:  Family Medicine  Contact information:  [address]  [address]  [place] [address]  [phone]                 Patient Instructions:      Diet Cardiac     Notify your health care provider if you experience any of the following:  temperature >100.4     Notify your health care provider if you experience any of the following:  persistent nausea and vomiting or diarrhea     Notify your health care provider if you experience any of the following:  persistent dizziness, light-headedness, or visual disturbances     Activity as tolerated       Significant Diagnostic Studies: Labs:   BMP:   Recent Labs   Lab 02/04/20  0605 02/05/20 0419   * 83   * 138   K 5.4* 4.8   CL 97 101   CO2 22* 23   BUN 42* 38*   CREATININE 5.0* 4.2*   CALCIUM 8.0* 8.1*    and CBC   Recent Labs   Lab 02/04/20  0605 02/05/20 0419   WBC 12.54 8.86   HGB 9.7* 9.1*   HCT 32.0* 30.6*    247       Pending Diagnostic Studies:     None         Medications:  Reconciled Home Medications:       Medication List      CHANGE how you take these medications    warfarin 2.5 MG tablet  Commonly known as:  COUMADIN  Take 1 tablet (2.5 mg total) by mouth Daily.  What changed:    · medication strength  · how much to take        CONTINUE taking these medications    diltiaZEM 180 MG 24 hr capsule  Commonly known as:  CARDIZEM CD  Take 180 mg by mouth once daily.     HYDROcodone-acetaminophen 7.5-325 mg per tablet  Commonly known as:  NORCO  Take 1 tablet by mouth every 6 (six) hours as needed for Pain.     isosorbide mononitrate 30 MG 24 hr tablet  Commonly known as:  IMDUR  Take 30 mg by mouth once daily.     losartan 25 MG tablet  Commonly known as:  COZAAR  Take 25 mg by mouth once daily.     magnesium oxide 400 mg (241.3 mg magnesium) tablet  Commonly known as:  MAG-OX  Take 400 mg by mouth once daily.     metoprolol succinate 50 MG 24 hr tablet  Commonly known as:  TOPROL-XL  Take 1 tablet (50 mg total) by mouth once daily.     ondansetron 4 MG Tbdl  Commonly known as:  ZOFRAN-ODT  Take 1 tablet (4 mg total) by mouth every 6 (six) hours as needed.     oxyCODONE-acetaminophen 5-325 mg per tablet  Commonly known as:  PERCOCET  Take 1 tablet by mouth daily as needed for Pain (with dialysis).     traMADol 50 mg tablet  Commonly known as:  ULTRAM  Take 1 tablet (50 mg total) by mouth 3 (three) times a week. 3 TIMES A WEEK AS NEEDED DURING HEMODIALYSIS            Indwelling Lines/Drains at time of discharge:   Lines/Drains/Airways     Drain                 Hemodialysis AV Fistula 08/08/19 0214 Right upper arm 181 days                Time spent on the discharge of patient: 25 minutes  Patient was seen and examined on the date of discharge and determined to be suitable for discharge.         Cierra Thomas MD  Department of Hospital Medicine  [facility]

## 2020-02-05 NOTE — PLAN OF CARE
Pt. Free from injury at this time, will continue to monitor. Pt. Pain controlled at this time, will continue to monitor. Safety precautions in place and call light left in reach.      Problem: Wound  Goal: Optimal Wound Healing  Outcome: Met  Intervention: Promote Effective Wound Healing  Flowsheets (Taken 2/5/2020 1603)  Oral Nutrition Promotion: calorie dense foods provided  Pain Management Interventions: pain management plan reviewed with patient/caregiver; relaxation techniques promoted; pillow support provided     Problem: Device-Related Complication Risk (Hemodialysis)  Goal: Safe, Effective Therapy Delivery  Outcome: Met     Problem: Fall Injury Risk  Goal: Absence of Fall and Fall-Related Injury  Outcome: Met  Intervention: Promote Injury-Free Environment  Flowsheets (Taken 2/5/2020 1603)  Safety Promotion/Fall Prevention: assistive device/personal item within reach; bed alarm set; instructed to call staff for mobility; side rails raised x 2; Fall Risk reviewed with patient/family; nonskid shoes/socks when out of bed; room near unit station; medications reviewed  Environmental Safety Modification: assistive device/personal items within reach; clutter free environment maintained; room organization consistent; room near unit station     Problem: Skin Injury Risk Increased  Goal: Skin Health and Integrity  Outcome: Met  Intervention: Promote and Optimize Oral Intake  Flowsheets (Taken 2/5/2020 1603)  Oral Nutrition Promotion: calorie dense foods provided

## 2020-02-05 NOTE — PLAN OF CARE
02/04/20 2013   Patient Assessment/Suction   Level of Consciousness (AVPU) responds to voice   Respiratory Effort Normal;Unlabored   Expansion/Accessory Muscles/Retractions no use of accessory muscles   All Lung Fields Breath Sounds   (few scattered cracklesnoted/mild exp. wheezing noted)   PRE-TX-O2   O2 Device (Oxygen Therapy) nasal cannula   Flow (L/min) 2   SpO2 100 %   Pulse Oximetry Type Continuous   $ Pulse Oximetry - Multiple Charge Pulse Oximetry - Multiple   Pulse 92   Resp (!) 22   Aerosol Therapy   $ Aerosol Therapy Charges Aerosol Treatment   Daily Review of Necessity (SVN) completed   Respiratory Treatment Status (SVN) given   Treatment Route (SVN) mask   Patient Position (SVN) HOB elevated   Post Treatment Assessment (SVN) increased aeration   Signs of Intolerance (SVN) none   Breath Sounds Post-Respiratory Treatment   Throughout All Fields Post-Treatment All Fields   Throughout All Fields Post-Treatment aeration increased   Post-treatment Heart Rate (beats/min) 93   Post-treatment Resp Rate (breaths/min) 20   continue tx. As ordered

## 2020-02-05 NOTE — PROGRESS NOTES
Net uf 2000 ml     02/04/20 1850   Post-Hemodialysis Assessment   Rinseback Volume (mL) 250 mL   Blood Volume Processed (Liters) 41.6 L   Dialyzer Clearance Lightly streaked   Duration of Treatment (minutes) 120 minutes   Hemodialysis Intake (mL) 500 mL   Total UF (mL) 2500 mL   Net Fluid Removal 2000   Patient Response to Treatment tolerated well   Post-Treatment Weight 61.1 kg (134 lb 11.2 oz)   Treatment Weight Change -2   Post-Hemodialysis Comments tx complete, pt stable. lines re infused, needles removed. hemostasis achieved. thrill and bruit present post tx.

## 2020-02-07 LAB
BACTERIA BLD CULT: NORMAL
BACTERIA BLD CULT: NORMAL

## 2020-02-11 ENCOUNTER — EXTERNAL HOME HEALTH (OUTPATIENT)
Dept: HOME HEALTH SERVICES | Facility: HOSPITAL | Age: 75
End: 2020-02-11
Payer: MEDICARE

## 2020-02-19 ENCOUNTER — OFFICE VISIT (OUTPATIENT)
Dept: FAMILY MEDICINE | Facility: CLINIC | Age: 75
End: 2020-02-19
Payer: MEDICARE

## 2020-02-19 DIAGNOSIS — N18.6 ESRD (END STAGE RENAL DISEASE): Chronic | ICD-10-CM

## 2020-02-19 DIAGNOSIS — I10 ESSENTIAL HYPERTENSION: Chronic | ICD-10-CM

## 2020-02-19 DIAGNOSIS — I51.3 LEFT ATRIAL THROMBUS: ICD-10-CM

## 2020-02-19 DIAGNOSIS — I27.20 PULMONARY HYPERTENSION: Chronic | ICD-10-CM

## 2020-02-19 DIAGNOSIS — I48.91 ATRIAL FIBRILLATION WITH RVR: Primary | ICD-10-CM

## 2020-02-19 DIAGNOSIS — K30 FUNCTIONAL DYSPEPSIA: ICD-10-CM

## 2020-02-19 DIAGNOSIS — J96.11 CHRONIC RESPIRATORY FAILURE WITH HYPOXIA: Chronic | ICD-10-CM

## 2020-02-19 DIAGNOSIS — E83.59 CALCIPHYLAXIS: Chronic | ICD-10-CM

## 2020-02-19 DIAGNOSIS — J31.0 RHINITIS, UNSPECIFIED TYPE: ICD-10-CM

## 2020-02-19 PROCEDURE — 99495 TRANSJ CARE MGMT MOD F2F 14D: CPT | Mod: S$GLB,,, | Performed by: FAMILY MEDICINE

## 2020-02-19 PROCEDURE — 99495 TCM SERVICES (MODERATE COMPLEXITY): ICD-10-PCS | Mod: S$GLB,,, | Performed by: FAMILY MEDICINE

## 2020-02-19 RX ORDER — OXYCODONE AND ACETAMINOPHEN 10; 325 MG/1; MG/1
1 TABLET ORAL 2 TIMES DAILY PRN
Status: ON HOLD | COMMUNITY
Start: 2020-02-14 | End: 2020-03-19 | Stop reason: SDUPTHER

## 2020-02-19 RX ORDER — ONDANSETRON 4 MG/1
4 TABLET, ORALLY DISINTEGRATING ORAL EVERY 6 HOURS PRN
Qty: 60 TABLET | Refills: 4 | Status: SHIPPED | OUTPATIENT
Start: 2020-02-19 | End: 2020-05-09

## 2020-02-19 RX ORDER — MUPIROCIN 20 MG/G
OINTMENT TOPICAL 3 TIMES DAILY
Qty: 30 G | Refills: 2 | Status: ON HOLD | OUTPATIENT
Start: 2020-02-19 | End: 2020-07-15 | Stop reason: HOSPADM

## 2020-02-19 RX ORDER — ASPIRIN 81 MG/1
81 TABLET ORAL DAILY
COMMUNITY
End: 2020-10-01

## 2020-02-19 RX ORDER — CALCIUM ACETATE 667 MG/1
667 CAPSULE ORAL DAILY
COMMUNITY
End: 2022-01-01

## 2020-02-19 NOTE — PROGRESS NOTES
SUBJECTIVE:    Patient ID: Griselda Neely is a 74 y.o. female.    Chief Complaint: HFU; runny nose/cold sxs xAwhile; and requests rx for mupirocin    The patient with multiple chronic illnesses including cardiomyopathy and end-stage renal disease on dialysis is brought in by family for hospital follow-up.  She went to the hospital with complaints of chest pain and shortness of breath.  Diagnosed with atrial fibrillation and was found to have a large intra-atrial thrombus.  She has been evaluated by Cardiology.  At this juncture it is preferred that patient remain in atrial fibrillation in order to avoid stroke secondary to movement of a clot.  She has home health.  She feels that she is back to baseline.  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  She continues on dialysis.  She now sees pain management Dr. Le secondary to pain that occurs in her legs during dialysis.  She uses Percocet for this.  She remains on chronic O2 for chronic congestive heart failure and chronic respiratory failure.        Admit Date: 2/2/20   Discharge Date: 2/5/20  Discharge Facility: Hospital    Medication Reconciliation:  No medication changes.   New Prescriptions filled after discharge: not applicable  Discharge summary reviewed:  yes  Pending test results at discharge reviewed:   not applicable  Follow up appointments scheduled:  yes   with Cardiology   Follow up labs/tests ordered:   not applicable  Home Health ordered on discharge:   yes  Home Health company name: [email or web address]  DME ordered at discharge:   no  How patient is feeling since discharge from the hospital?  At her baseline         Admission on 02/02/2020, Discharged on 02/05/2020   Component Date Value Ref Range Status    WBC 02/02/2020 5.57  3.90 - 12.70 K/uL Final    RBC 02/02/2020 3.72* 4.00 - 5.40 M/uL Final    Hemoglobin 02/02/2020 10.3* 12.0 - 16.0 g/dL Final    Hematocrit 02/02/2020 34.2* 37.0 - 48.5 % Final     Mean Corpuscular Volume 02/02/2020 92  82 - 98 fL Final    Mean Corpuscular Hemoglobin 02/02/2020 27.7  27.0 - 31.0 pg Final    Mean Corpuscular Hemoglobin Conc 02/02/2020 30.1* 32.0 - 36.0 g/dL Final    RDW 02/02/2020 18.4* 11.5 - 14.5 % Final    Platelets 02/02/2020 347  150 - 350 K/uL Final    MPV 02/02/2020 10.7  9.2 - 12.9 fL Final    Immature Granulocytes 02/02/2020 0.4  0.0 - 0.5 % Final    Gran # (ANC) 02/02/2020 3.9  1.8 - 7.7 K/uL Final    Immature Grans (Abs) 02/02/2020 0.02  0.00 - 0.04 K/uL Final    Lymph # 02/02/2020 1.0  1.0 - 4.8 K/uL Final    Mono # 02/02/2020 0.7  0.3 - 1.0 K/uL Final    Eos # 02/02/2020 0.0  0.0 - 0.5 K/uL Final    Baso # 02/02/2020 0.01  0.00 - 0.20 K/uL Final    nRBC 02/02/2020 0  0 /100 WBC Final    Gran% 02/02/2020 69.4  38.0 - 73.0 % Final    Lymph% 02/02/2020 17.1* 18.0 - 48.0 % Final    Mono% 02/02/2020 12.2  4.0 - 15.0 % Final    Eosinophil% 02/02/2020 0.7  0.0 - 8.0 % Final    Basophil% 02/02/2020 0.2  0.0 - 1.9 % Final    Differential Method 02/02/2020 Automated   Final    Sodium 02/02/2020 140  136 - 145 mmol/L Final    Potassium 02/02/2020 4.1  3.5 - 5.1 mmol/L Final    Chloride 02/02/2020 94* 95 - 110 mmol/L Final    CO2 02/02/2020 31* 23 - 29 mmol/L Final    Glucose 02/02/2020 81  70 - 110 mg/dL Final    BUN, Bld 02/02/2020 34* 8 - 23 mg/dL Final    Creatinine 02/02/2020 6.1* 0.5 - 1.4 mg/dL Final    Calcium 02/02/2020 7.7* 8.7 - 10.5 mg/dL Final    Total Protein 02/02/2020 7.8  6.0 - 8.4 g/dL Final    Albumin 02/02/2020 2.9* 3.5 - 5.2 g/dL Final    Total Bilirubin 02/02/2020 0.9  0.1 - 1.0 mg/dL Final    Alkaline Phosphatase 02/02/2020 102  55 - 135 U/L Final    AST 02/02/2020 28  10 - 40 U/L Final    ALT 02/02/2020 12  10 - 44 U/L Final    Anion Gap 02/02/2020 15  8 - 16 mmol/L Final    eGFR if African American 02/02/2020 7.3* >60 mL/min/1.73 m^2 Final    eGFR if non African American 02/02/2020 6.3* >60 mL/min/1.73 m^2 Final     Troponin I 02/02/2020 0.191* <=0.040 ng/mL Final    BNP 02/02/2020 >4,500* 0 - 99 pg/mL Final    Magnesium 02/02/2020 1.8  1.6 - 2.6 mg/dL Final    Blood Culture, Routine 02/02/2020 No growth after 5 days.   Final    Blood Culture, Routine 02/02/2020 No growth after 5 days.   Final    Lactate (Lactic Acid) 02/02/2020 1.9  0.5 - 1.9 mmol/L Final    Troponin I 02/02/2020 0.175* <=0.040 ng/mL Final    Lactate (Lactic Acid) 02/02/2020 1.6  0.5 - 1.9 mmol/L Final    PT 02/02/2020 34.6* 10.6 - 14.8 sec Final    INR 02/02/2020 3.6   Final    POC Glucose 02/02/2020 131* 70 - 110 Final    Hepatitis B Surface Ag 02/02/2020 Negative  Negative Final    MRSA SCREEN BY PCR 02/02/2020 Negative  Negative Final    Sodium 02/03/2020 137  136 - 145 mmol/L Final    Potassium 02/03/2020 4.7  3.5 - 5.1 mmol/L Final    Chloride 02/03/2020 100  95 - 110 mmol/L Final    CO2 02/03/2020 25  23 - 29 mmol/L Final    Glucose 02/03/2020 158* 70 - 110 mg/dL Final    BUN, Bld 02/03/2020 16  8 - 23 mg/dL Final    Creatinine 02/03/2020 3.5* 0.5 - 1.4 mg/dL Final    Calcium 02/03/2020 7.9* 8.7 - 10.5 mg/dL Final    Anion Gap 02/03/2020 12  8 - 16 mmol/L Final    eGFR if African American 02/03/2020 14.2* >60 mL/min/1.73 m^2 Final    eGFR if non African American 02/03/2020 12.3* >60 mL/min/1.73 m^2 Final    BNP 02/03/2020 >4,500* 0 - 99 pg/mL Final    Troponin I 02/03/2020 0.116* <=0.040 ng/mL Final    PT 02/03/2020 36.3* 10.6 - 14.8 sec Final    INR 02/03/2020 3.8   Final    Sodium 02/04/2020 135* 136 - 145 mmol/L Final    Potassium 02/04/2020 5.4* 3.5 - 5.1 mmol/L Final    Chloride 02/04/2020 97  95 - 110 mmol/L Final    CO2 02/04/2020 22* 23 - 29 mmol/L Final    Glucose 02/04/2020 117* 70 - 110 mg/dL Final    BUN, Bld 02/04/2020 42* 8 - 23 mg/dL Final    Creatinine 02/04/2020 5.0* 0.5 - 1.4 mg/dL Final    Calcium 02/04/2020 8.0* 8.7 - 10.5 mg/dL Final    Anion Gap 02/04/2020 16  8 - 16 mmol/L Final    eGFR if   02/04/2020 9.2* >60 mL/min/1.73 m^2 Final    eGFR if non African American 02/04/2020 8.0* >60 mL/min/1.73 m^2 Final    PT 02/04/2020 29.5* 10.6 - 14.8 sec Final    INR 02/04/2020 2.9   Final    PT 02/04/2020 29.5* 10.6 - 14.8 sec Final    INR 02/04/2020 2.9   Final    WBC 02/04/2020 12.54  3.90 - 12.70 K/uL Final    RBC 02/04/2020 3.50* 4.00 - 5.40 M/uL Final    Hemoglobin 02/04/2020 9.7* 12.0 - 16.0 g/dL Final    Hematocrit 02/04/2020 32.0* 37.0 - 48.5 % Final    Mean Corpuscular Volume 02/04/2020 91  82 - 98 fL Final    Mean Corpuscular Hemoglobin 02/04/2020 27.7  27.0 - 31.0 pg Final    Mean Corpuscular Hemoglobin Conc 02/04/2020 30.3* 32.0 - 36.0 g/dL Final    RDW 02/04/2020 17.7* 11.5 - 14.5 % Final    Platelets 02/04/2020 279  150 - 350 K/uL Final    MPV 02/04/2020 10.5  9.2 - 12.9 fL Final    BNP 02/04/2020 >4,500* 0 - 99 pg/mL Final    Sodium 02/05/2020 138  136 - 145 mmol/L Final    Potassium 02/05/2020 4.8  3.5 - 5.1 mmol/L Final    Chloride 02/05/2020 101  95 - 110 mmol/L Final    CO2 02/05/2020 23  23 - 29 mmol/L Final    Glucose 02/05/2020 83  70 - 110 mg/dL Final    BUN, Bld 02/05/2020 38* 8 - 23 mg/dL Final    Creatinine 02/05/2020 4.2* 0.5 - 1.4 mg/dL Final    Calcium 02/05/2020 8.1* 8.7 - 10.5 mg/dL Final    Anion Gap 02/05/2020 14  8 - 16 mmol/L Final    eGFR if African American 02/05/2020 11.4* >60 mL/min/1.73 m^2 Final    eGFR if non African American 02/05/2020 9.9* >60 mL/min/1.73 m^2 Final    PT 02/05/2020 23.8* 10.6 - 14.8 sec Final    INR 02/05/2020 2.2   Final    WBC 02/05/2020 8.86  3.90 - 12.70 K/uL Final    RBC 02/05/2020 3.33* 4.00 - 5.40 M/uL Final    Hemoglobin 02/05/2020 9.1* 12.0 - 16.0 g/dL Final    Hematocrit 02/05/2020 30.6* 37.0 - 48.5 % Final    Mean Corpuscular Volume 02/05/2020 92  82 - 98 fL Final    Mean Corpuscular Hemoglobin 02/05/2020 27.3  27.0 - 31.0 pg Final    Mean Corpuscular Hemoglobin Conc 02/05/2020 29.7* 32.0 -  36.0 g/dL Final    RDW 02/05/2020 17.4* 11.5 - 14.5 % Final    Platelets 02/05/2020 247  150 - 350 K/uL Final    MPV 02/05/2020 9.8  9.2 - 12.9 fL Final   Lab Visit on 01/09/2020   Component Date Value Ref Range Status    PT 01/09/2020 23.5* 10.6 - 14.8 sec Final    INR 01/09/2020 2.2   Final   No results displayed because visit has over 200 results.      Admission on 12/20/2019, Discharged on 12/21/2019   Component Date Value Ref Range Status    WBC 12/20/2019 8.53  3.90 - 12.70 K/uL Final    RBC 12/20/2019 3.90* 4.00 - 5.40 M/uL Final    Hemoglobin 12/20/2019 10.8* 12.0 - 16.0 g/dL Final    Hematocrit 12/20/2019 33.8* 37.0 - 48.5 % Final    Mean Corpuscular Volume 12/20/2019 87  82 - 98 fL Final    Mean Corpuscular Hemoglobin 12/20/2019 27.7  27.0 - 31.0 pg Final    Mean Corpuscular Hemoglobin Conc 12/20/2019 32.0  32.0 - 36.0 g/dL Final    RDW 12/20/2019 18.2* 11.5 - 14.5 % Final    Platelets 12/20/2019 181  150 - 350 K/uL Final    MPV 12/20/2019 10.4  9.2 - 12.9 fL Final    Immature Granulocytes 12/20/2019 0.7* 0.0 - 0.5 % Final    Gran # (ANC) 12/20/2019 7.4  1.8 - 7.7 K/uL Final    Immature Grans (Abs) 12/20/2019 0.06* 0.00 - 0.04 K/uL Final    Lymph # 12/20/2019 0.6* 1.0 - 4.8 K/uL Final    Mono # 12/20/2019 0.4  0.3 - 1.0 K/uL Final    Eos # 12/20/2019 0.0  0.0 - 0.5 K/uL Final    Baso # 12/20/2019 0.01  0.00 - 0.20 K/uL Final    nRBC 12/20/2019 0  0 /100 WBC Final    Gran% 12/20/2019 86.9* 38.0 - 73.0 % Final    Lymph% 12/20/2019 7.3* 18.0 - 48.0 % Final    Mono% 12/20/2019 5.0  4.0 - 15.0 % Final    Eosinophil% 12/20/2019 0.0  0.0 - 8.0 % Final    Basophil% 12/20/2019 0.1  0.0 - 1.9 % Final    Differential Method 12/20/2019 Automated   Final    Sodium 12/20/2019 137  136 - 145 mmol/L Final    Potassium 12/20/2019 4.6  3.5 - 5.1 mmol/L Final    Chloride 12/20/2019 87* 95 - 110 mmol/L Final    CO2 12/20/2019 25  23 - 29 mmol/L Final    Glucose 12/20/2019 80  70 - 110 mg/dL  Final    BUN, Bld 12/20/2019 43* 8 - 23 mg/dL Final    Creatinine 12/20/2019 5.7* 0.5 - 1.4 mg/dL Final    Calcium 12/20/2019 8.6* 8.7 - 10.5 mg/dL Final    Total Protein 12/20/2019 7.6  6.0 - 8.4 g/dL Final    Albumin 12/20/2019 3.0* 3.5 - 5.2 g/dL Final    Total Bilirubin 12/20/2019 2.1* 0.1 - 1.0 mg/dL Final    Alkaline Phosphatase 12/20/2019 167* 55 - 135 U/L Final    AST 12/20/2019 26  10 - 40 U/L Final    ALT 12/20/2019 14  10 - 44 U/L Final    Anion Gap 12/20/2019 25* 8 - 16 mmol/L Final    eGFR if  12/20/2019 7.9* >60 mL/min/1.73 m^2 Final    eGFR if non  12/20/2019 6.8* >60 mL/min/1.73 m^2 Final    Lactate (Lactic Acid) 12/20/2019 2.6* 0.5 - 1.9 mmol/L Final    Troponin I 12/20/2019 0.100* <=0.040 ng/mL Final    BNP 12/20/2019 >4,500* 0 - 99 pg/mL Final    Blood Culture, Routine 12/20/2019 No growth after 5 days.   Final    Blood Culture, Routine 12/20/2019 No growth after 5 days.   Final    Lactate (Lactic Acid) 12/20/2019 2.3* 0.5 - 1.9 mmol/L Final    Ammonia 12/20/2019 33  10 - 50 umol/L Final    Lactate (Lactic Acid) 12/20/2019 1.4  0.5 - 1.9 mmol/L Final    POC Glucose 12/20/2019 146* 70 - 110 Final    POC Glucose 12/20/2019 174* 70 - 110 Final    Sodium 12/21/2019 139  136 - 145 mmol/L Final    Potassium 12/21/2019 4.2  3.5 - 5.1 mmol/L Final    Chloride 12/21/2019 92* 95 - 110 mmol/L Final    CO2 12/21/2019 23  23 - 29 mmol/L Final    Glucose 12/21/2019 150* 70 - 110 mg/dL Final    BUN, Bld 12/21/2019 34* 8 - 23 mg/dL Final    Creatinine 12/21/2019 4.3* 0.5 - 1.4 mg/dL Final    Calcium 12/21/2019 8.5* 8.7 - 10.5 mg/dL Final    Anion Gap 12/21/2019 24* 8 - 16 mmol/L Final    eGFR if  12/21/2019 11.1* >60 mL/min/1.73 m^2 Final    eGFR if non African American 12/21/2019 9.6* >60 mL/min/1.73 m^2 Final    Magnesium 12/21/2019 1.8  1.6 - 2.6 mg/dL Final    Phosphorus 12/21/2019 4.6* 2.7 - 4.5 mg/dL Final    WBC 12/21/2019  10.18  3.90 - 12.70 K/uL Final    RBC 12/21/2019 3.98* 4.00 - 5.40 M/uL Final    Hemoglobin 12/21/2019 10.9* 12.0 - 16.0 g/dL Final    Hematocrit 12/21/2019 34.1* 37.0 - 48.5 % Final    Mean Corpuscular Volume 12/21/2019 86  82 - 98 fL Final    Mean Corpuscular Hemoglobin 12/21/2019 27.4  27.0 - 31.0 pg Final    Mean Corpuscular Hemoglobin Conc 12/21/2019 32.0  32.0 - 36.0 g/dL Final    RDW 12/21/2019 17.8* 11.5 - 14.5 % Final    Platelets 12/21/2019 174  150 - 350 K/uL Final    MPV 12/21/2019 10.0  9.2 - 12.9 fL Final    Occult Blood 12/21/2019 Positive* Negative Final    POC Glucose 12/21/2019 135* 70 - 110 Final    Hematocrit 12/21/2019 33.2* 37.0 - 48.5 % Final    Hemoglobin 12/21/2019 10.9* 12.0 - 16.0 g/dL Final    POC Glucose 12/21/2019 143* 70 - 110 Final   Admission on 10/22/2019, Discharged on 10/25/2019   Component Date Value Ref Range Status    POC Glucose 10/22/2019 94  70 - 110 Final    WBC 10/22/2019 4.65  3.90 - 12.70 K/uL Final    RBC 10/22/2019 3.94* 4.00 - 5.40 M/uL Final    Hemoglobin 10/22/2019 11.6* 12.0 - 16.0 g/dL Final    Hematocrit 10/22/2019 36.9* 37.0 - 48.5 % Final    Mean Corpuscular Volume 10/22/2019 94  82 - 98 fL Final    Mean Corpuscular Hemoglobin 10/22/2019 29.4  27.0 - 31.0 pg Final    Mean Corpuscular Hemoglobin Conc 10/22/2019 31.4* 32.0 - 36.0 g/dL Final    RDW 10/22/2019 19.7* 11.5 - 14.5 % Final    Platelets 10/22/2019 143* 150 - 350 K/uL Final    MPV 10/22/2019 10.9  9.2 - 12.9 fL Final    Immature Granulocytes 10/22/2019 0.6* 0.0 - 0.5 % Final    Gran # (ANC) 10/22/2019 3.7  1.8 - 7.7 K/uL Final    Immature Grans (Abs) 10/22/2019 0.03  0.00 - 0.04 K/uL Final    Lymph # 10/22/2019 0.6* 1.0 - 4.8 K/uL Final    Mono # 10/22/2019 0.3  0.3 - 1.0 K/uL Final    Eos # 10/22/2019 0.0  0.0 - 0.5 K/uL Final    Baso # 10/22/2019 0.01  0.00 - 0.20 K/uL Final    nRBC 10/22/2019 0  0 /100 WBC Final    Gran% 10/22/2019 79.3* 38.0 - 73.0 % Final    Lymph%  10/22/2019 12.3* 18.0 - 48.0 % Final    Mono% 10/22/2019 6.7  4.0 - 15.0 % Final    Eosinophil% 10/22/2019 0.9  0.0 - 8.0 % Final    Basophil% 10/22/2019 0.2  0.0 - 1.9 % Final    Differential Method 10/22/2019 Automated   Final    Sodium 10/22/2019 137  136 - 145 mmol/L Final    Potassium 10/22/2019 4.5  3.5 - 5.1 mmol/L Final    Chloride 10/22/2019 98  95 - 110 mmol/L Final    CO2 10/22/2019 24  23 - 29 mmol/L Final    Glucose 10/22/2019 99  70 - 110 mg/dL Final    BUN, Bld 10/22/2019 36* 8 - 23 mg/dL Final    Creatinine 10/22/2019 5.6* 0.5 - 1.4 mg/dL Final    Calcium 10/22/2019 7.8* 8.7 - 10.5 mg/dL Final    Total Protein 10/22/2019 6.3  6.0 - 8.4 g/dL Final    Albumin 10/22/2019 2.9* 3.5 - 5.2 g/dL Final    Total Bilirubin 10/22/2019 1.3* 0.1 - 1.0 mg/dL Final    Alkaline Phosphatase 10/22/2019 219* 55 - 135 U/L Final    AST 10/22/2019 41* 10 - 40 U/L Final    ALT 10/22/2019 107* 10 - 44 U/L Final    Anion Gap 10/22/2019 15  8 - 16 mmol/L Final    eGFR if African American 10/22/2019 8.0* >60 mL/min/1.73 m^2 Final    eGFR if non African American 10/22/2019 7.0* >60 mL/min/1.73 m^2 Final    Troponin I 10/22/2019 0.070* 0.020 - 0.040 ng/mL Final    BNP 10/22/2019 >4,500* 0 - 99 pg/mL Final    PT 10/22/2019 13.3  10.6 - 14.8 sec Final    INR 10/22/2019 1.1   Final    Magnesium 10/22/2019 2.3  1.6 - 2.6 mg/dL Final    Blood Culture, Routine 10/22/2019 No growth after 5 days.   Final    POC Lactate 10/22/2019 1.60  0.5 - 2.2 mmol/L Final    Sample 10/22/2019 VENOUS   Final    Troponin I 10/22/2019 0.072* 0.020 - 0.040 ng/mL Final    Sodium 10/23/2019 137  136 - 145 mmol/L Final    Potassium 10/23/2019 4.3  3.5 - 5.1 mmol/L Final    Chloride 10/23/2019 96  95 - 110 mmol/L Final    CO2 10/23/2019 25  23 - 29 mmol/L Final    Glucose 10/23/2019 79  70 - 110 mg/dL Final    BUN, Bld 10/23/2019 42* 8 - 23 mg/dL Final    Creatinine 10/23/2019 6.5* 0.5 - 1.4 mg/dL Final    Calcium  10/23/2019 7.7* 8.7 - 10.5 mg/dL Final    Anion Gap 10/23/2019 16  8 - 16 mmol/L Final    eGFR if  10/23/2019 6.7* >60 mL/min/1.73 m^2 Final    eGFR if non African American 10/23/2019 5.8* >60 mL/min/1.73 m^2 Final    Magnesium 10/23/2019 2.5  1.6 - 2.6 mg/dL Final    Phosphorus 10/23/2019 5.1* 2.7 - 4.5 mg/dL Final    WBC 10/23/2019 4.91  3.90 - 12.70 K/uL Final    RBC 10/23/2019 4.07  4.00 - 5.40 M/uL Final    Hemoglobin 10/23/2019 12.2  12.0 - 16.0 g/dL Final    Hematocrit 10/23/2019 37.3  37.0 - 48.5 % Final    Mean Corpuscular Volume 10/23/2019 92  82 - 98 fL Final    Mean Corpuscular Hemoglobin 10/23/2019 30.0  27.0 - 31.0 pg Final    Mean Corpuscular Hemoglobin Conc 10/23/2019 32.7  32.0 - 36.0 g/dL Final    RDW 10/23/2019 19.9* 11.5 - 14.5 % Final    Platelets 10/23/2019 168  150 - 350 K/uL Final    MPV 10/23/2019 11.5  9.2 - 12.9 fL Final    Immature Granulocytes 10/23/2019 0.8* 0.0 - 0.5 % Final    Gran # (ANC) 10/23/2019 3.8  1.8 - 7.7 K/uL Final    Immature Grans (Abs) 10/23/2019 0.04  0.00 - 0.04 K/uL Final    Lymph # 10/23/2019 0.7* 1.0 - 4.8 K/uL Final    Mono # 10/23/2019 0.4  0.3 - 1.0 K/uL Final    Eos # 10/23/2019 0.0  0.0 - 0.5 K/uL Final    Baso # 10/23/2019 0.01  0.00 - 0.20 K/uL Final    nRBC 10/23/2019 0  0 /100 WBC Final    Gran% 10/23/2019 76.9* 38.0 - 73.0 % Final    Lymph% 10/23/2019 13.6* 18.0 - 48.0 % Final    Mono% 10/23/2019 7.7  4.0 - 15.0 % Final    Eosinophil% 10/23/2019 0.8  0.0 - 8.0 % Final    Basophil% 10/23/2019 0.2  0.0 - 1.9 % Final    Differential Method 10/23/2019 Automated   Final    Troponin I 10/23/2019 0.070* 0.020 - 0.040 ng/mL Final    POC Glucose 10/23/2019 82  70 - 110 Final    POC Glucose 10/23/2019 143* 70 - 110 Final    POC Glucose 10/23/2019 67* 70 - 110 Final    POC Glucose 10/23/2019 115* 70 - 110 Final    Sodium 10/24/2019 137  136 - 145 mmol/L Final    Potassium 10/24/2019 4.1  3.5 - 5.1 mmol/L Final     Chloride 10/24/2019 94* 95 - 110 mmol/L Final    CO2 10/24/2019 24  23 - 29 mmol/L Final    Glucose 10/24/2019 85  70 - 110 mg/dL Final    BUN, Bld 10/24/2019 24* 8 - 23 mg/dL Final    Creatinine 10/24/2019 5.1* 0.5 - 1.4 mg/dL Final    Calcium 10/24/2019 7.8* 8.7 - 10.5 mg/dL Final    Anion Gap 10/24/2019 19* 8 - 16 mmol/L Final    eGFR if  10/24/2019 9.0* >60 mL/min/1.73 m^2 Final    eGFR if non African American 10/24/2019 7.8* >60 mL/min/1.73 m^2 Final    Magnesium 10/24/2019 2.1  1.6 - 2.6 mg/dL Final    Phosphorus 10/24/2019 3.1  2.7 - 4.5 mg/dL Final    WBC 10/24/2019 4.40  3.90 - 12.70 K/uL Final    RBC 10/24/2019 4.31  4.00 - 5.40 M/uL Final    Hemoglobin 10/24/2019 12.7  12.0 - 16.0 g/dL Final    Hematocrit 10/24/2019 39.6  37.0 - 48.5 % Final    Mean Corpuscular Volume 10/24/2019 92  82 - 98 fL Final    Mean Corpuscular Hemoglobin 10/24/2019 29.5  27.0 - 31.0 pg Final    Mean Corpuscular Hemoglobin Conc 10/24/2019 32.1  32.0 - 36.0 g/dL Final    RDW 10/24/2019 19.6* 11.5 - 14.5 % Final    Platelets 10/24/2019 138* 150 - 350 K/uL Final    MPV 10/24/2019 9.8  9.2 - 12.9 fL Final    Immature Granulocytes 10/24/2019 0.5  0.0 - 0.5 % Final    Gran # (ANC) 10/24/2019 3.1  1.8 - 7.7 K/uL Final    Immature Grans (Abs) 10/24/2019 0.02  0.00 - 0.04 K/uL Final    Lymph # 10/24/2019 0.7* 1.0 - 4.8 K/uL Final    Mono # 10/24/2019 0.5  0.3 - 1.0 K/uL Final    Eos # 10/24/2019 0.0  0.0 - 0.5 K/uL Final    Baso # 10/24/2019 0.02  0.00 - 0.20 K/uL Final    nRBC 10/24/2019 0  0 /100 WBC Final    Gran% 10/24/2019 70.4  38.0 - 73.0 % Final    Lymph% 10/24/2019 16.1* 18.0 - 48.0 % Final    Mono% 10/24/2019 11.8  4.0 - 15.0 % Final    Eosinophil% 10/24/2019 0.7  0.0 - 8.0 % Final    Basophil% 10/24/2019 0.5  0.0 - 1.9 % Final    Differential Method 10/24/2019 Automated   Final    POC Glucose 10/24/2019 75  70 - 110 Final    POC Glucose 10/24/2019 94  70 - 110 Final    POC  Glucose 10/24/2019 123* 70 - 110 Final    POC Glucose 10/24/2019 131* 70 - 110 Final    Sodium 10/25/2019 136  136 - 145 mmol/L Final    Potassium 10/25/2019 4.0  3.5 - 5.1 mmol/L Final    Chloride 10/25/2019 91* 95 - 110 mmol/L Final    CO2 10/25/2019 24  23 - 29 mmol/L Final    Glucose 10/25/2019 81  70 - 110 mg/dL Final    BUN, Bld 10/25/2019 36* 8 - 23 mg/dL Final    Creatinine 10/25/2019 6.1* 0.5 - 1.4 mg/dL Final    Calcium 10/25/2019 7.4* 8.7 - 10.5 mg/dL Final    Anion Gap 10/25/2019 21* 8 - 16 mmol/L Final    eGFR if African American 10/25/2019 7.3* >60 mL/min/1.73 m^2 Final    eGFR if non African American 10/25/2019 6.3* >60 mL/min/1.73 m^2 Final    Magnesium 10/25/2019 2.2  1.6 - 2.6 mg/dL Final    Phosphorus 10/25/2019 3.2  2.7 - 4.5 mg/dL Final    WBC 10/25/2019 4.70  3.90 - 12.70 K/uL Final    RBC 10/25/2019 3.91* 4.00 - 5.40 M/uL Final    Hemoglobin 10/25/2019 11.4* 12.0 - 16.0 g/dL Final    Hematocrit 10/25/2019 35.9* 37.0 - 48.5 % Final    Mean Corpuscular Volume 10/25/2019 92  82 - 98 fL Final    Mean Corpuscular Hemoglobin 10/25/2019 29.2  27.0 - 31.0 pg Final    Mean Corpuscular Hemoglobin Conc 10/25/2019 31.8* 32.0 - 36.0 g/dL Final    RDW 10/25/2019 19.0* 11.5 - 14.5 % Final    Platelets 10/25/2019 161  150 - 350 K/uL Final    MPV 10/25/2019 9.8  9.2 - 12.9 fL Final    Immature Granulocytes 10/25/2019 0.6* 0.0 - 0.5 % Final    Gran # (ANC) 10/25/2019 3.4  1.8 - 7.7 K/uL Final    Immature Grans (Abs) 10/25/2019 0.03  0.00 - 0.04 K/uL Final    Lymph # 10/25/2019 0.8* 1.0 - 4.8 K/uL Final    Mono # 10/25/2019 0.5  0.3 - 1.0 K/uL Final    Eos # 10/25/2019 0.1  0.0 - 0.5 K/uL Final    Baso # 10/25/2019 0.01  0.00 - 0.20 K/uL Final    nRBC 10/25/2019 0  0 /100 WBC Final    Gran% 10/25/2019 71.5  38.0 - 73.0 % Final    Lymph% 10/25/2019 16.6* 18.0 - 48.0 % Final    Mono% 10/25/2019 10.0  4.0 - 15.0 % Final    Eosinophil% 10/25/2019 1.1  0.0 - 8.0 % Final     Basophil% 10/25/2019 0.2  0.0 - 1.9 % Final    Differential Method 10/25/2019 Automated   Final    POC Glucose 10/25/2019 88  70 - 110 Final    POC Glucose 10/25/2019 159* 70 - 110 Final   No results displayed because visit has over 200 results.      Lab Visit on 10/07/2019   Component Date Value Ref Range Status    Sodium 10/07/2019 137  136 - 145 mmol/L Final    Potassium 10/07/2019 5.1  3.5 - 5.1 mmol/L Final    Chloride 10/07/2019 87* 95 - 110 mmol/L Final    CO2 10/07/2019 25  23 - 29 mmol/L Final    Glucose 10/07/2019 88  70 - 110 mg/dL Final    BUN, Bld 10/07/2019 81* 8 - 23 mg/dL Final    Creatinine 10/07/2019 6.9* 0.5 - 1.4 mg/dL Final    Calcium 10/07/2019 7.0* 8.7 - 10.5 mg/dL Final    Total Protein 10/07/2019 7.3  6.0 - 8.4 g/dL Final    Albumin 10/07/2019 3.2* 3.5 - 5.2 g/dL Final    Total Bilirubin 10/07/2019 1.0  0.1 - 1.0 mg/dL Final    Alkaline Phosphatase 10/07/2019 176* 55 - 135 U/L Final    AST 10/07/2019 47* 10 - 40 U/L Final    ALT 10/07/2019 50* 10 - 44 U/L Final    Anion Gap 10/07/2019 25* 8 - 16 mmol/L Final    eGFR if African American 10/07/2019 6.3* >60 mL/min/1.73 m^2 Final    eGFR if non African American 10/07/2019 5.4* >60 mL/min/1.73 m^2 Final    WBC 10/07/2019 8.41  3.90 - 12.70 K/uL Final    RBC 10/07/2019 3.86* 4.00 - 5.40 M/uL Final    Hemoglobin 10/07/2019 11.2* 12.0 - 16.0 g/dL Final    Hematocrit 10/07/2019 34.7* 37.0 - 48.5 % Final    Mean Corpuscular Volume 10/07/2019 90  82 - 98 fL Final    Mean Corpuscular Hemoglobin 10/07/2019 29.0  27.0 - 31.0 pg Final    Mean Corpuscular Hemoglobin Conc 10/07/2019 32.3  32.0 - 36.0 g/dL Final    RDW 10/07/2019 18.8* 11.5 - 14.5 % Final    Platelets 10/07/2019 274  150 - 350 K/uL Final    MPV 10/07/2019 11.1  9.2 - 12.9 fL Final    Immature Granulocytes 10/07/2019 1.0* 0.0 - 0.5 % Final    Gran # (ANC) 10/07/2019 6.6  1.8 - 7.7 K/uL Final    Immature Grans (Abs) 10/07/2019 0.08* 0.00 - 0.04 K/uL Final     Lymph # 10/07/2019 0.7* 1.0 - 4.8 K/uL Final    Mono # 10/07/2019 1.0  0.3 - 1.0 K/uL Final    Eos # 10/07/2019 0.0  0.0 - 0.5 K/uL Final    Baso # 10/07/2019 0.01  0.00 - 0.20 K/uL Final    nRBC 10/07/2019 1* 0 /100 WBC Final    Gran% 10/07/2019 78.5* 38.0 - 73.0 % Final    Lymph% 10/07/2019 8.1* 18.0 - 48.0 % Final    Mono% 10/07/2019 11.8  4.0 - 15.0 % Final    Eosinophil% 10/07/2019 0.5  0.0 - 8.0 % Final    Basophil% 10/07/2019 0.1  0.0 - 1.9 % Final    Differential Method 10/07/2019 Automated   Final    Magnesium 10/07/2019 2.1  1.6 - 2.6 mg/dL Final    BNP 10/07/2019 >4,500* 0 - 99 pg/mL Final    Valproic Acid Level 10/07/2019 <10.0* 50.0 - 100.0 ug/mL Final   Admission on 09/29/2019, Discharged on 10/01/2019   Component Date Value Ref Range Status    WBC 09/29/2019 5.96  3.90 - 12.70 K/uL Final    RBC 09/29/2019 3.23* 4.00 - 5.40 M/uL Final    Hemoglobin 09/29/2019 9.3* 12.0 - 16.0 g/dL Final    Hematocrit 09/29/2019 28.9* 37.0 - 48.5 % Final    Mean Corpuscular Volume 09/29/2019 90  82 - 98 fL Final    Mean Corpuscular Hemoglobin 09/29/2019 28.8  27.0 - 31.0 pg Final    Mean Corpuscular Hemoglobin Conc 09/29/2019 32.2  32.0 - 36.0 g/dL Final    RDW 09/29/2019 18.4* 11.5 - 14.5 % Final    Platelets 09/29/2019 217  150 - 350 K/uL Final    MPV 09/29/2019 9.8  9.2 - 12.9 fL Final    Immature Granulocytes 09/29/2019 0.3  0.0 - 0.5 % Final    Gran # (ANC) 09/29/2019 4.6  1.8 - 7.7 K/uL Final    Immature Grans (Abs) 09/29/2019 0.02  0.00 - 0.04 K/uL Final    Lymph # 09/29/2019 0.9* 1.0 - 4.8 K/uL Final    Mono # 09/29/2019 0.4  0.3 - 1.0 K/uL Final    Eos # 09/29/2019 0.1  0.0 - 0.5 K/uL Final    Baso # 09/29/2019 0.02  0.00 - 0.20 K/uL Final    nRBC 09/29/2019 0  0 /100 WBC Final    Gran% 09/29/2019 76.8* 38.0 - 73.0 % Final    Lymph% 09/29/2019 15.1* 18.0 - 48.0 % Final    Mono% 09/29/2019 6.7  4.0 - 15.0 % Final    Eosinophil% 09/29/2019 0.8  0.0 - 8.0 % Final    Basophil%  09/29/2019 0.3  0.0 - 1.9 % Final    Differential Method 09/29/2019 Automated   Final    Sodium 09/29/2019 139  136 - 145 mmol/L Final    Potassium 09/29/2019 3.9  3.5 - 5.1 mmol/L Final    Chloride 09/29/2019 90* 95 - 110 mmol/L Final    CO2 09/29/2019 29  23 - 29 mmol/L Final    Glucose 09/29/2019 137* 70 - 110 mg/dL Final    BUN, Bld 09/29/2019 34* 8 - 23 mg/dL Final    Creatinine 09/29/2019 5.7* 0.5 - 1.4 mg/dL Final    Calcium 09/29/2019 7.6* 8.7 - 10.5 mg/dL Final    Total Protein 09/29/2019 7.7  6.0 - 8.4 g/dL Final    Albumin 09/29/2019 2.9* 3.5 - 5.2 g/dL Final    Total Bilirubin 09/29/2019 0.7  0.1 - 1.0 mg/dL Final    Alkaline Phosphatase 09/29/2019 164* 55 - 135 U/L Final    AST 09/29/2019 33  10 - 40 U/L Final    ALT 09/29/2019 24  10 - 44 U/L Final    Anion Gap 09/29/2019 20* 8 - 16 mmol/L Final    eGFR if African American 09/29/2019 7.9* >60 mL/min/1.73 m^2 Final    eGFR if non African American 09/29/2019 6.8* >60 mL/min/1.73 m^2 Final    Troponin I 09/29/2019 0.046* 0.020 - 0.040 ng/mL Final    BNP 09/29/2019 >4,500* 0 - 99 pg/mL Final    PT 09/29/2019 13.7  11.7 - 14.0 sec Final    INR 09/29/2019 1.1   Final    Magnesium 09/29/2019 2.1  1.6 - 2.6 mg/dL Final    Troponin I 09/29/2019 0.041* 0.020 - 0.040 ng/mL Final    Creatinine 09/29/2019 6.4* 0.5 - 1.4 mg/dL Final    eGFR if African American 09/29/2019 6.8* >60 mL/min/1.73 m^2 Final    eGFR if non African American 09/29/2019 5.9* >60 mL/min/1.73 m^2 Final    Free T4 09/29/2019 0.79  0.71 - 1.51 ng/dL Final    TSH 09/29/2019 3.155  0.340 - 5.600 uIU/mL Final    Sodium 09/30/2019 137  136 - 145 mmol/L Final    Potassium 09/30/2019 4.1  3.5 - 5.1 mmol/L Final    Chloride 09/30/2019 89* 95 - 110 mmol/L Final    CO2 09/30/2019 26  23 - 29 mmol/L Final    Glucose 09/30/2019 200* 70 - 110 mg/dL Final    BUN, Bld 09/30/2019 45* 8 - 23 mg/dL Final    Creatinine 09/30/2019 6.6* 0.5 - 1.4 mg/dL Final    Calcium 09/30/2019  7.3* 8.7 - 10.5 mg/dL Final    Anion Gap 09/30/2019 22* 8 - 16 mmol/L Final    eGFR if African American 09/30/2019 6.6* >60 mL/min/1.73 m^2 Final    eGFR if non African American 09/30/2019 5.7* >60 mL/min/1.73 m^2 Final    Magnesium 09/30/2019 2.1  1.6 - 2.6 mg/dL Final    Phosphorus 09/30/2019 5.6* 2.7 - 4.5 mg/dL Final    WBC 09/30/2019 7.16  3.90 - 12.70 K/uL Final    RBC 09/30/2019 2.86* 4.00 - 5.40 M/uL Final    Hemoglobin 09/30/2019 8.3* 12.0 - 16.0 g/dL Final    Hematocrit 09/30/2019 25.2* 37.0 - 48.5 % Final    Mean Corpuscular Volume 09/30/2019 88  82 - 98 fL Final    Mean Corpuscular Hemoglobin 09/30/2019 29.0  27.0 - 31.0 pg Final    Mean Corpuscular Hemoglobin Conc 09/30/2019 32.9  32.0 - 36.0 g/dL Final    RDW 09/30/2019 18.1* 11.5 - 14.5 % Final    Platelets 09/30/2019 219  150 - 350 K/uL Final    MPV 09/30/2019 10.8  9.2 - 12.9 fL Final    Immature Granulocytes 09/30/2019 0.4  0.0 - 0.5 % Final    Gran # (ANC) 09/30/2019 6.6  1.8 - 7.7 K/uL Final    Immature Grans (Abs) 09/30/2019 0.03  0.00 - 0.04 K/uL Final    Lymph # 09/30/2019 0.3* 1.0 - 4.8 K/uL Final    Mono # 09/30/2019 0.3  0.3 - 1.0 K/uL Final    Eos # 09/30/2019 0.0  0.0 - 0.5 K/uL Final    Baso # 09/30/2019 0.01  0.00 - 0.20 K/uL Final    nRBC 09/30/2019 0  0 /100 WBC Final    Gran% 09/30/2019 92.2* 38.0 - 73.0 % Final    Lymph% 09/30/2019 3.8* 18.0 - 48.0 % Final    Mono% 09/30/2019 3.5* 4.0 - 15.0 % Final    Eosinophil% 09/30/2019 0.0  0.0 - 8.0 % Final    Basophil% 09/30/2019 0.1  0.0 - 1.9 % Final    Differential Method 09/30/2019 Automated   Final    Sodium 10/01/2019 138  136 - 145 mmol/L Final    Potassium 10/01/2019 4.0  3.5 - 5.1 mmol/L Final    Chloride 10/01/2019 92* 95 - 110 mmol/L Final    CO2 10/01/2019 25  23 - 29 mmol/L Final    Glucose 10/01/2019 132* 70 - 110 mg/dL Final    BUN, Bld 10/01/2019 37* 8 - 23 mg/dL Final    Creatinine 10/01/2019 4.2* 0.5 - 1.4 mg/dL Final    Calcium  10/01/2019 7.6* 8.7 - 10.5 mg/dL Final    Anion Gap 10/01/2019 21* 8 - 16 mmol/L Final    eGFR if African American 10/01/2019 11.4* >60 mL/min/1.73 m^2 Final    eGFR if non African American 10/01/2019 9.9* >60 mL/min/1.73 m^2 Final    Magnesium 10/01/2019 2.0  1.6 - 2.6 mg/dL Final    Phosphorus 10/01/2019 4.2  2.7 - 4.5 mg/dL Final    WBC 10/01/2019 11.94  3.90 - 12.70 K/uL Final    RBC 10/01/2019 3.14* 4.00 - 5.40 M/uL Final    Hemoglobin 10/01/2019 8.9* 12.0 - 16.0 g/dL Final    Hematocrit 10/01/2019 28.4* 37.0 - 48.5 % Final    Mean Corpuscular Volume 10/01/2019 90  82 - 98 fL Final    Mean Corpuscular Hemoglobin 10/01/2019 28.3  27.0 - 31.0 pg Final    Mean Corpuscular Hemoglobin Conc 10/01/2019 31.3* 32.0 - 36.0 g/dL Final    RDW 10/01/2019 18.6* 11.5 - 14.5 % Final    Platelets 10/01/2019 263  150 - 350 K/uL Final    MPV 10/01/2019 10.8  9.2 - 12.9 fL Final    Immature Granulocytes 10/01/2019 1.0* 0.0 - 0.5 % Final    Gran # (ANC) 10/01/2019 11.0* 1.8 - 7.7 K/uL Final    Immature Grans (Abs) 10/01/2019 0.12* 0.00 - 0.04 K/uL Final    Lymph # 10/01/2019 0.3* 1.0 - 4.8 K/uL Final    Mono # 10/01/2019 0.5  0.3 - 1.0 K/uL Final    Eos # 10/01/2019 0.0  0.0 - 0.5 K/uL Final    Baso # 10/01/2019 0.00  0.00 - 0.20 K/uL Final    nRBC 10/01/2019 0  0 /100 WBC Final    Gran% 10/01/2019 92.0* 38.0 - 73.0 % Final    Lymph% 10/01/2019 2.8* 18.0 - 48.0 % Final    Mono% 10/01/2019 4.2  4.0 - 15.0 % Final    Eosinophil% 10/01/2019 0.0  0.0 - 8.0 % Final    Basophil% 10/01/2019 0.0  0.0 - 1.9 % Final    Differential Method 10/01/2019 Automated   Final   No results displayed because visit has over 200 results.          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    both legs    CHF (congestive heart failure)     Encounter for blood transfusion 03/2016    Gout     Hypertension     Mitral valve regurgitation     Osteoarthritis     Pancreatitis     Peripheral  vascular disease     Peritoneal dialysis catheter in place     Pneumonia 09/09/2017    Renal failure      Past Surgical History:   Procedure Laterality Date    ACHILLES TENDON SURGERY Right     ANGIOGRAPHY OF ARTERIOVENOUS SHUNT Right 1/7/2020    Procedure: Fistulogram with Possible Intervention;  Surgeon: Joseph Loyola MD;  Location: [facility] CATH/EP LAB;  Service: Cardiology;  Laterality: Right;    APPENDECTOMY      CARDIAC CATHETERIZATION  07/03/2017    CHOLECYSTECTOMY      heal surgery Right     HYSTERECTOMY      INSERTION OF STENT INTO PERIPHERAL VESSEL N/A 1/7/2020    Procedure: INSERTION, STENT, VESSEL, PERIPHERAL;  Surgeon: Joseph Loyola MD;  Location: [facility] CATH/EP LAB;  Service: Cardiology;  Laterality: N/A;    PARATHYROIDECTOMY      PARATHYROIDECTOMY  07/13/2017    PERCUTANEOUS TRANSLUMINAL ANGIOPLASTY OF ARTERIOVENOUS FISTULA N/A 1/7/2020    Procedure: PTA, AV FISTULA;  Surgeon: Joseph Loyola MD;  Location: [facility] CATH/EP LAB;  Service: Cardiology;  Laterality: N/A;    PERITONEAL CATHETER INSERTION      RENAL BIOPSY      vocal cord nodule       Family History   Problem Relation Age of Onset    Heart disease Sister     Early death Sister         heart as baby    Heart disease Maternal Grandfather     Diabetes Mother     Hypertension Mother     Heart failure Mother     Heart disease Mother     Arthritis Mother     Diabetes Father     Early death Sister         infant       Marital Status:   Alcohol History:  reports that she does not drink alcohol.  Tobacco History:  reports that she has been smoking. She has a 22.50 pack-year smoking history. She has never used smokeless tobacco.  Drug History:  reports that she does not use drugs.    Review of patient's allergies indicates:  No Known Allergies    Current Outpatient Medications:     aspirin (ECOTRIN) 81 MG EC tablet, Take 81 mg by mouth once daily., Disp: , Rfl:     calcium acetate (PHOSLO) 667 mg capsule, Take 667 mg  "by mouth., Disp: , Rfl:     diltiaZEM (CARDIZEM CD) 180 MG 24 hr capsule, Take 180 mg by mouth once daily., Disp: , Rfl:     HYDROcodone-acetaminophen (NORCO) 7.5-325 mg per tablet, Take 1 tablet by mouth every 6 (six) hours as needed for Pain., Disp: , Rfl:     isosorbide mononitrate (IMDUR) 30 MG 24 hr tablet, Take 30 mg by mouth once daily., Disp: , Rfl:     losartan (COZAAR) 25 MG tablet, Take 25 mg by mouth once daily., Disp: , Rfl:     magnesium oxide (MAG-OX) 400 mg (241.3 mg magnesium) tablet, Take 400 mg by mouth once daily., Disp: , Rfl:     metoprolol succinate (TOPROL-XL) 50 MG 24 hr tablet, Take 1 tablet (50 mg total) by mouth once daily. (Patient taking differently: Take 50 mg by mouth once daily. ), Disp: 30 tablet, Rfl: 11    ondansetron (ZOFRAN-ODT) 4 MG TbDL, Take 1 tablet (4 mg total) by mouth every 6 (six) hours as needed., Disp: 60 tablet, Rfl: 4    oxyCODONE-acetaminophen (PERCOCET)  mg per tablet, Take 1 tablet by mouth 2 (two) times daily as needed., Disp: , Rfl:     traMADol (ULTRAM) 50 mg tablet, Take 1 tablet (50 mg total) by mouth 3 (three) times a week. 3 TIMES A WEEK AS NEEDED DURING HEMODIALYSIS, Disp: 90 tablet, Rfl: 3    warfarin (COUMADIN) 2.5 MG tablet, Take 1 tablet (2.5 mg total) by mouth Daily., Disp: 30 tablet, Rfl: 1    mupirocin (BACTROBAN) 2 % ointment, Apply topically 3 (three) times daily., Disp: 30 g, Rfl: 2    Review of Systems   Constitutional: Positive for fatigue. Negative for fever.   HENT: Positive for rhinorrhea.    Respiratory: Positive for shortness of breath.    Gastrointestinal: Negative for constipation and diarrhea.   Musculoskeletal: Positive for arthralgias.   Neurological: Negative for light-headedness and headaches.        Objective:      Vitals:    02/19/20 1241   BP: (P) 100/64   Pulse: (P) 92   Weight: (P) 64 kg (141 lb)   Height: (P) 5' 5" (1.651 m)     Physical Exam   Constitutional:   Chronically ill-appearing female seated in " wheelchair.  Oxygen in place via nasal cannula   HENT:   Head: Normocephalic and atraumatic.   Eyes: Pupils are equal, round, and reactive to light. EOM are normal.   Cardiovascular: An irregularly irregular rhythm present.   No murmur heard.  Pulmonary/Chest: No respiratory distress. She has wheezes.   Abdominal: Soft. Bowel sounds are normal.   Musculoskeletal: She exhibits edema.   Vitals reviewed.      Assessment:       1. Atrial fibrillation with RVR    2. Functional dyspepsia    3. Rhinitis, unspecified type    4. Essential hypertension    5. ESRD (end stage renal disease) on HD M,W, F    6. Calciphylaxis    7. Pulmonary hypertension    8. Chronic respiratory failure with hypoxia    9. Left atrial thrombus         Plan:       Atrial fibrillation with RVR  Comments:  Asymptomatic current Radha.  Follow up closely by Cardiology    Functional dyspepsia  -     ondansetron (ZOFRAN-ODT) 4 MG TbDL; Take 1 tablet (4 mg total) by mouth every 6 (six) hours as needed.  Dispense: 60 tablet; Refill: 4    Rhinitis, unspecified type  -     mupirocin (BACTROBAN) 2 % ointment; Apply topically 3 (three) times daily.  Dispense: 30 g; Refill: 2    Essential hypertension    ESRD (end stage renal disease) on HD M,W, F  Comments:  Per Nephrology    Calciphylaxis  Comments:  Continue wound care    Pulmonary hypertension    Chronic respiratory failure with hypoxia    Left atrial thrombus      Follow up if symptoms worsen or fail to improve.

## 2020-02-27 ENCOUNTER — EXTERNAL HOME HEALTH (OUTPATIENT)
Dept: HOME HEALTH SERVICES | Facility: HOSPITAL | Age: 75
End: 2020-02-27

## 2020-03-05 ENCOUNTER — TELEPHONE (OUTPATIENT)
Dept: HOME HEALTH SERVICES | Facility: HOSPITAL | Age: 75
End: 2020-03-05

## 2020-03-14 ENCOUNTER — HOSPITAL ENCOUNTER (INPATIENT)
Facility: HOSPITAL | Age: 75
LOS: 4 days | Discharge: HOME HEALTH | DRG: 299 | End: 2020-03-19
Attending: EMERGENCY MEDICINE | Admitting: INTERNAL MEDICINE
Payer: MEDICARE

## 2020-03-14 DIAGNOSIS — N18.6 STAGE 5 CHRONIC KIDNEY DISEASE ON CHRONIC DIALYSIS: Primary | ICD-10-CM

## 2020-03-14 DIAGNOSIS — I48.19 OTHER PERSISTENT ATRIAL FIBRILLATION: ICD-10-CM

## 2020-03-14 DIAGNOSIS — N18.6 ESRD (END STAGE RENAL DISEASE): Chronic | ICD-10-CM
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
## 2020-03-14 DIAGNOSIS — M79.606 LEG PAIN: ICD-10-CM

## 2020-03-14 DIAGNOSIS — R52 PAIN: ICD-10-CM

## 2020-03-14 DIAGNOSIS — I70.90 ACUTE OCCLUSION OF ARTERY: ICD-10-CM

## 2020-03-14 DIAGNOSIS — R06.02 SHORTNESS OF BREATH: ICD-10-CM

## 2020-03-14 DIAGNOSIS — Z99.2 STAGE 5 CHRONIC KIDNEY DISEASE ON CHRONIC DIALYSIS: Primary | ICD-10-CM

## 2020-03-14 DIAGNOSIS — I73.9 PVD (PERIPHERAL VASCULAR DISEASE): ICD-10-CM

## 2020-03-14 DIAGNOSIS — I70.90 ARTERIAL OCCLUSION DUE TO ARTERIOSCLEROSIS: ICD-10-CM

## 2020-03-14 LAB
ACANTHOCYTES BLD QL SMEAR: PRESENT
ALBUMIN SERPL BCP-MCNC: 2.9 G/DL (ref 3.5–5.2)
ALP SERPL-CCNC: 155 U/L (ref 55–135)
ALT SERPL W/O P-5'-P-CCNC: 60 U/L (ref 10–44)
ANION GAP SERPL CALC-SCNC: 14 MMOL/L (ref 8–16)
ANISOCYTOSIS BLD QL SMEAR: ABNORMAL
APTT PPP: 57.9 SEC (ref 23.6–33.3)
APTT PPP: >200 SEC (ref 23.6–33.3)
AST SERPL-CCNC: 119 U/L (ref 10–40)
BASO STIPL BLD QL SMEAR: ABNORMAL
BASOPHILS # BLD AUTO: 0.01 K/UL (ref 0–0.2)
BASOPHILS # BLD AUTO: 0.02 K/UL (ref 0–0.2)
BASOPHILS NFR BLD: 0.1 % (ref 0–1.9)
BASOPHILS NFR BLD: 0.2 % (ref 0–1.9)
BILIRUB SERPL-MCNC: 1.8 MG/DL (ref 0.1–1)
BNP SERPL-MCNC: >4500 PG/ML (ref 0–99)
BUN SERPL-MCNC: 42 MG/DL (ref 8–23)
CALCIUM SERPL-MCNC: 8.5 MG/DL (ref 8.7–10.5)
CHLORIDE SERPL-SCNC: 90 MMOL/L (ref 95–110)
CO2 SERPL-SCNC: 31 MMOL/L (ref 23–29)
CREAT SERPL-MCNC: 4.3 MG/DL (ref 0.5–1.4)
DACRYOCYTES BLD QL SMEAR: ABNORMAL
DIFFERENTIAL METHOD: ABNORMAL
DIFFERENTIAL METHOD: ABNORMAL
EOSINOPHIL # BLD AUTO: 0 K/UL (ref 0–0.5)
EOSINOPHIL # BLD AUTO: 0.1 K/UL (ref 0–0.5)
EOSINOPHIL NFR BLD: 0.4 % (ref 0–8)
EOSINOPHIL NFR BLD: 0.6 % (ref 0–8)
ERYTHROCYTE [DISTWIDTH] IN BLOOD BY AUTOMATED COUNT: 16.9 % (ref 11.5–14.5)
ERYTHROCYTE [DISTWIDTH] IN BLOOD BY AUTOMATED COUNT: 17.2 % (ref 11.5–14.5)
EST. GFR  (AFRICAN AMERICAN): 11.1 ML/MIN/1.73 M^2
EST. GFR  (NON AFRICAN AMERICAN): 9.6 ML/MIN/1.73 M^2
GLUCOSE SERPL-MCNC: 98 MG/DL (ref 70–110)
HCT VFR BLD AUTO: 29 % (ref 37–48.5)
HCT VFR BLD AUTO: 33.5 % (ref 37–48.5)
HGB BLD-MCNC: 10.5 G/DL (ref 12–16)
HGB BLD-MCNC: 9 G/DL (ref 12–16)
HYPOCHROMIA BLD QL SMEAR: ABNORMAL
IMM GRANULOCYTES # BLD AUTO: 0.05 K/UL (ref 0–0.04)
IMM GRANULOCYTES # BLD AUTO: 0.05 K/UL (ref 0–0.04)
IMM GRANULOCYTES NFR BLD AUTO: 0.5 % (ref 0–0.5)
IMM GRANULOCYTES NFR BLD AUTO: 0.5 % (ref 0–0.5)
INR PPP: 4.9
INR PPP: 5.2
LYMPHOCYTES # BLD AUTO: 0.9 K/UL (ref 1–4.8)
LYMPHOCYTES # BLD AUTO: 1 K/UL (ref 1–4.8)
LYMPHOCYTES NFR BLD: 10 % (ref 18–48)
LYMPHOCYTES NFR BLD: 8.9 % (ref 18–48)
MAGNESIUM SERPL-MCNC: 2.2 MG/DL (ref 1.6–2.6)
MCH RBC QN AUTO: 26.5 PG (ref 27–31)
MCH RBC QN AUTO: 27 PG (ref 27–31)
MCHC RBC AUTO-ENTMCNC: 31 G/DL (ref 32–36)
MCHC RBC AUTO-ENTMCNC: 31.3 G/DL (ref 32–36)
MCV RBC AUTO: 85 FL (ref 82–98)
MCV RBC AUTO: 86 FL (ref 82–98)
MONOCYTES # BLD AUTO: 0.8 K/UL (ref 0.3–1)
MONOCYTES # BLD AUTO: 0.9 K/UL (ref 0.3–1)
MONOCYTES NFR BLD: 8.6 % (ref 4–15)
MONOCYTES NFR BLD: 9.2 % (ref 4–15)
MRSA SCREEN BY PCR: NEGATIVE
NEUTROPHILS # BLD AUTO: 7.6 K/UL (ref 1.8–7.7)
NEUTROPHILS # BLD AUTO: 7.7 K/UL (ref 1.8–7.7)
NEUTROPHILS NFR BLD: 79.8 % (ref 38–73)
NEUTROPHILS NFR BLD: 81.2 % (ref 38–73)
NRBC BLD-RTO: 0 /100 WBC
NRBC BLD-RTO: 0 /100 WBC
PLATELET # BLD AUTO: 172 K/UL (ref 150–350)
PLATELET # BLD AUTO: 200 K/UL (ref 150–350)
PLATELET BLD QL SMEAR: ABNORMAL
PMV BLD AUTO: 11.3 FL (ref 9.2–12.9)
PMV BLD AUTO: 11.5 FL (ref 9.2–12.9)
POIKILOCYTOSIS BLD QL SMEAR: SLIGHT
POLYCHROMASIA BLD QL SMEAR: ABNORMAL
POTASSIUM SERPL-SCNC: 4.6 MMOL/L (ref 3.5–5.1)
PROT SERPL-MCNC: 7.4 G/DL (ref 6–8.4)
PROTHROMBIN TIME: 43.6 SEC (ref 10.6–14.8)
PROTHROMBIN TIME: 46 SEC (ref 10.6–14.8)
RBC # BLD AUTO: 3.4 M/UL (ref 4–5.4)
RBC # BLD AUTO: 3.89 M/UL (ref 4–5.4)
SCHISTOCYTES BLD QL SMEAR: ABNORMAL
SODIUM SERPL-SCNC: 135 MMOL/L (ref 136–145)
TARGETS BLD QL SMEAR: ABNORMAL
TROPONIN I SERPL DL<=0.01 NG/ML-MCNC: 0.06 NG/ML
WBC # BLD AUTO: 9.52 K/UL (ref 3.9–12.7)
WBC # BLD AUTO: 9.52 K/UL (ref 3.9–12.7)

## 2020-03-14 PROCEDURE — 87641 MR-STAPH DNA AMP PROBE: CPT

## 2020-03-14 PROCEDURE — 99285 EMERGENCY DEPT VISIT HI MDM: CPT | Mod: 25

## 2020-03-14 PROCEDURE — 83880 ASSAY OF NATRIURETIC PEPTIDE: CPT

## 2020-03-14 PROCEDURE — 93005 ELECTROCARDIOGRAM TRACING: CPT | Performed by: INTERNAL MEDICINE

## 2020-03-14 PROCEDURE — 96374 THER/PROPH/DIAG INJ IV PUSH: CPT

## 2020-03-14 PROCEDURE — 90935 HEMODIALYSIS ONE EVALUATION: CPT

## 2020-03-14 PROCEDURE — G0378 HOSPITAL OBSERVATION PER HR: HCPCS

## 2020-03-14 PROCEDURE — 83735 ASSAY OF MAGNESIUM: CPT

## 2020-03-14 PROCEDURE — 96375 TX/PRO/DX INJ NEW DRUG ADDON: CPT

## 2020-03-14 PROCEDURE — 86706 HEP B SURFACE ANTIBODY: CPT

## 2020-03-14 PROCEDURE — 25000003 PHARM REV CODE 250: Performed by: INTERNAL MEDICINE

## 2020-03-14 PROCEDURE — 85610 PROTHROMBIN TIME: CPT | Mod: 91

## 2020-03-14 PROCEDURE — 85025 COMPLETE CBC W/AUTO DIFF WBC: CPT | Mod: 91

## 2020-03-14 PROCEDURE — 63600175 PHARM REV CODE 636 W HCPCS: Performed by: INTERNAL MEDICINE

## 2020-03-14 PROCEDURE — 63600175 PHARM REV CODE 636 W HCPCS: Performed by: EMERGENCY MEDICINE

## 2020-03-14 PROCEDURE — 85730 THROMBOPLASTIN TIME PARTIAL: CPT

## 2020-03-14 PROCEDURE — 80053 COMPREHEN METABOLIC PANEL: CPT

## 2020-03-14 PROCEDURE — 80074 ACUTE HEPATITIS PANEL: CPT

## 2020-03-14 PROCEDURE — 36415 COLL VENOUS BLD VENIPUNCTURE: CPT

## 2020-03-14 PROCEDURE — 85610 PROTHROMBIN TIME: CPT

## 2020-03-14 PROCEDURE — 84484 ASSAY OF TROPONIN QUANT: CPT

## 2020-03-14 PROCEDURE — 25000003 PHARM REV CODE 250: Performed by: EMERGENCY MEDICINE

## 2020-03-14 RX ORDER — ONDANSETRON 2 MG/ML
4 INJECTION INTRAMUSCULAR; INTRAVENOUS EVERY 6 HOURS PRN
Status: DISCONTINUED | OUTPATIENT
Start: 2020-03-14 | End: 2020-03-19 | Stop reason: HOSPADM

## 2020-03-14 RX ORDER — DILTIAZEM HYDROCHLORIDE 180 MG/1
180 CAPSULE, COATED, EXTENDED RELEASE ORAL DAILY
Status: DISCONTINUED | OUTPATIENT
Start: 2020-03-14 | End: 2020-03-19 | Stop reason: HOSPADM

## 2020-03-14 RX ORDER — HEPARIN SODIUM,PORCINE/D5W 25000/250
17 INTRAVENOUS SOLUTION INTRAVENOUS CONTINUOUS
Status: DISCONTINUED | OUTPATIENT
Start: 2020-03-14 | End: 2020-03-18

## 2020-03-14 RX ORDER — METOPROLOL SUCCINATE 50 MG/1
50 TABLET, EXTENDED RELEASE ORAL DAILY
Status: DISCONTINUED | OUTPATIENT
Start: 2020-03-14 | End: 2020-03-19 | Stop reason: HOSPADM

## 2020-03-14 RX ORDER — ASPIRIN 81 MG/1
81 TABLET ORAL DAILY
Status: DISCONTINUED | OUTPATIENT
Start: 2020-03-14 | End: 2020-03-19 | Stop reason: HOSPADM

## 2020-03-14 RX ORDER — ONDANSETRON 2 MG/ML
4 INJECTION INTRAMUSCULAR; INTRAVENOUS
Status: COMPLETED | OUTPATIENT
Start: 2020-03-14 | End: 2020-03-14

## 2020-03-14 RX ORDER — HYDROMORPHONE HYDROCHLORIDE 1 MG/ML
0.5 INJECTION, SOLUTION INTRAMUSCULAR; INTRAVENOUS; SUBCUTANEOUS EVERY 6 HOURS PRN
Status: DISCONTINUED | OUTPATIENT
Start: 2020-03-14 | End: 2020-03-19 | Stop reason: HOSPADM

## 2020-03-14 RX ORDER — HYDROCODONE BITARTRATE AND ACETAMINOPHEN 7.5; 325 MG/1; MG/1
1 TABLET ORAL EVERY 6 HOURS PRN
Status: DISCONTINUED | OUTPATIENT
Start: 2020-03-14 | End: 2020-03-14

## 2020-03-14 RX ORDER — OXYCODONE AND ACETAMINOPHEN 5; 325 MG/1; MG/1
1 TABLET ORAL
Status: COMPLETED | OUTPATIENT
Start: 2020-03-14 | End: 2020-03-14

## 2020-03-14 RX ORDER — LOSARTAN POTASSIUM 25 MG/1
25 TABLET ORAL DAILY
Status: DISCONTINUED | OUTPATIENT
Start: 2020-03-14 | End: 2020-03-19 | Stop reason: HOSPADM

## 2020-03-14 RX ORDER — HYDROMORPHONE HYDROCHLORIDE 1 MG/ML
0.5 INJECTION, SOLUTION INTRAMUSCULAR; INTRAVENOUS; SUBCUTANEOUS
Status: COMPLETED | OUTPATIENT
Start: 2020-03-14 | End: 2020-03-14

## 2020-03-14 RX ORDER — HYDROCODONE BITARTRATE AND ACETAMINOPHEN 7.5; 325 MG/1; MG/1
1 TABLET ORAL EVERY 4 HOURS PRN
Status: DISCONTINUED | OUTPATIENT
Start: 2020-03-14 | End: 2020-03-19 | Stop reason: HOSPADM

## 2020-03-14 RX ADMIN — HEPARIN SODIUM 18 UNITS/KG/HR: 10000 INJECTION, SOLUTION INTRAVENOUS at 01:03

## 2020-03-14 RX ADMIN — HYDROMORPHONE HYDROCHLORIDE 0.5 MG: 1 INJECTION, SOLUTION INTRAMUSCULAR; INTRAVENOUS; SUBCUTANEOUS at 06:03

## 2020-03-14 RX ADMIN — OXYCODONE AND ACETAMINOPHEN 1 TABLET: 5; 325 TABLET ORAL at 10:03

## 2020-03-14 RX ADMIN — ONDANSETRON 4 MG: 2 INJECTION INTRAMUSCULAR; INTRAVENOUS at 06:03

## 2020-03-14 RX ADMIN — HYDROCODONE BITARTRATE AND ACETAMINOPHEN 1 TABLET: 7.5; 325 TABLET ORAL at 06:03

## 2020-03-14 NOTE — ED PROVIDER NOTES
"Encounter Date: 3/14/2020       History     Chief Complaint   Patient presents with    Shortness of Breath     "fluid overload / went to dialysis yesterday and was told had fluid on lungs"      74-year-old female who has a history of chronic kidney disease, CHF, gout, hypertension, mitral valve regurgitation, pancreatitis, presents emergency room with complaints primarily of left leg pain after falling out of bed yesterday.  The patient also had some complaints of shortness of breath but less than it had been over the last few days.  She had been dialyzed 3 days in a row for suspected volume overload.  The patient has had no complaints of any chest pain or unusual cough.  No fever.  No wheezing.  The patient is a neuritic.  She has no complaint of back pain.  Bowel movements have been normal.        Review of patient's allergies indicates:  No Known Allergies  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    both legs    CHF (congestive heart failure)     Encounter for blood transfusion 03/2016    Gout     Hypertension     Mitral valve regurgitation     Osteoarthritis     Pancreatitis     Peripheral vascular disease     Peritoneal dialysis catheter in place     Pneumonia 09/09/2017    Renal failure      Past Surgical History:   Procedure Laterality Date    ACHILLES TENDON SURGERY Right     ANGIOGRAPHY OF ARTERIOVENOUS SHUNT Right 1/7/2020    Procedure: Fistulogram with Possible Intervention;  Surgeon: Joseph Loyola MD;  Location: [facility] CATH/EP LAB;  Service: Cardiology;  Laterality: Right;    APPENDECTOMY      CARDIAC CATHETERIZATION  07/03/2017    CHOLECYSTECTOMY      heal surgery Right     HYSTERECTOMY      INSERTION OF STENT INTO PERIPHERAL VESSEL N/A 1/7/2020    Procedure: INSERTION, STENT, VESSEL, PERIPHERAL;  Surgeon: Joseph Loyola MD;  Location: [facility] CATH/EP LAB;  Service: Cardiology;  Laterality: N/A;    PARATHYROIDECTOMY      PARATHYROIDECTOMY  " 07/13/2017    PERCUTANEOUS TRANSLUMINAL ANGIOPLASTY OF ARTERIOVENOUS FISTULA N/A 1/7/2020    Procedure: PTA, AV FISTULA;  Surgeon: Joseph Loyola MD;  Location: [facility] CATH/EP LAB;  Service: Cardiology;  Laterality: N/A;    PERITONEAL CATHETER INSERTION      RENAL BIOPSY      vocal cord nodule       Family History   Problem Relation Age of Onset    Heart disease Sister     Early death Sister         heart as baby    Heart disease Maternal Grandfather     Diabetes Mother     Hypertension Mother     Heart failure Mother     Heart disease Mother     Arthritis Mother     Diabetes Father     Early death Sister         infant     Social History     Tobacco Use    Smoking status: Current Some Day Smoker     Packs/day: 0.50     Years: 45.00     Pack years: 22.50     Types: Cigarettes    Smokeless tobacco: Never Used   Substance Use Topics    Alcohol use: No    Drug use: No     Review of Systems   Constitutional: Negative for activity change, appetite change, chills, diaphoresis and fever.   HENT: Negative for congestion, rhinorrhea and sore throat.    Eyes: Negative for redness.   Respiratory: Negative for cough, shortness of breath and wheezing.    Cardiovascular: Negative for chest pain and palpitations.   Gastrointestinal: Negative for abdominal pain, constipation, diarrhea and nausea.   Genitourinary: Negative for dysuria.        Aneuric   Musculoskeletal: Positive for arthralgias and myalgias. Negative for back pain.   Skin: Positive for color change and wound. Negative for rash.   Neurological: Negative for dizziness, syncope and weakness.   Hematological: Does not bruise/bleed easily.   All other systems reviewed and are negative.      Physical Exam     Initial Vitals [03/14/20 0525]   BP Pulse Resp Temp SpO2   (!) 202/98 96 (!) 22 98.2 °F (36.8 °C) 96 %      MAP       --         Physical Exam    Constitutional: She appears well-developed and well-nourished. She is not diaphoretic. No distress.    Chronically ill-appearing   HENT:   Head: Normocephalic and atraumatic.   Nose: Nose normal.   Mouth/Throat: Oropharynx is clear and moist. No oropharyngeal exudate.   Eyes: Conjunctivae are normal. Pupils are equal, round, and reactive to light. Right eye exhibits no discharge. Left eye exhibits no discharge.   Neck: Normal range of motion. Neck supple. No JVD present.   Cardiovascular: Normal rate, normal heart sounds and intact distal pulses. Exam reveals no friction rub.    No murmur heard.  Irregular irregular rhythm   Pulmonary/Chest: Breath sounds normal. No respiratory distress. She has no wheezes. She has no rhonchi. She has no rales.   Abdominal: Soft. Bowel sounds are normal. She exhibits no distension. There is no tenderness. There is no rebound and no guarding.   Musculoskeletal: She exhibits edema and tenderness.   Peripheral pulses in both feet are not palpable.  Legs have some brawny edema below the knee.  She has exquisite pain with any palpation to the left foot calf or thigh.   Lymphadenopathy:     She has no cervical adenopathy.   Neurological: She is alert and oriented to person, place, and time. She has normal strength. GCS score is 15. GCS eye subscore is 4. GCS verbal subscore is 5. GCS motor subscore is 6.   Skin: Skin is warm and dry. Capillary refill takes less than 2 seconds. No rash noted. No erythema. No pallor.   Psychiatric: She has a normal mood and affect. Thought content normal.         ED Course   Procedures  Labs Reviewed   B-TYPE NATRIURETIC PEPTIDE - Abnormal; Notable for the following components:       Result Value    BNP >4,500 (*)     All other components within normal limits   CBC W/ AUTO DIFFERENTIAL - Abnormal; Notable for the following components:    RBC 3.89 (*)     Hemoglobin 10.5 (*)     Hematocrit 33.5 (*)     Mean Corpuscular Hemoglobin Conc 31.3 (*)     RDW 17.2 (*)     Immature Grans (Abs) 0.05 (*)     Lymph # 0.9 (*)     Gran% 81.2 (*)     Lymph% 8.9 (*)      All other components within normal limits   COMPREHENSIVE METABOLIC PANEL - Abnormal; Notable for the following components:    Sodium 135 (*)     Chloride 90 (*)     CO2 31 (*)     BUN, Bld 42 (*)     Creatinine 4.3 (*)     Calcium 8.5 (*)     Albumin 2.9 (*)     Total Bilirubin 1.8 (*)     Alkaline Phosphatase 155 (*)      (*)     ALT 60 (*)     eGFR if  11.1 (*)     eGFR if non  9.6 (*)     All other components within normal limits   TROPONIN I - Abnormal; Notable for the following components:    Troponin I 0.056 (*)     All other components within normal limits    Narrative:      Troponin critical result(s) repeated. Called and verbal readback   obtained from Hanna Michelle RN ER.  by CW1 03/14/2020 06:35   MAGNESIUM        ECG Results          EKG 12-lead (In process)  Result time 03/14/20 05:58:27    In process by Interface, Lab In [facility] (03/14/20 05:58:27)                 Narrative:    Test Reason : R06.02,    Vent. Rate : 089 BPM     Atrial Rate : 127 BPM     P-R Int : 000 ms          QRS Dur : 098 ms      QT Int : 394 ms       P-R-T Axes : 000 -28 155 degrees     QTc Int : 479 ms    Atrial fibrillation  ST and T wave abnormality, consider anterolateral ischemia  Prolonged QT  Abnormal ECG  When compared with ECG of 02-FEB-2020 15:36,  T wave inversion more evident in Anterior-lateral leads    Referred By:             Confirmed By:                             Imaging Results          X-Ray Tibia Fibula 2 View Left (In process)                X-Ray Femur AP/LAT Left (In process)                X-Ray Chest AP Portable (In process)                X-Ray Foot Complete Left (In process)                               Attending Attestation:             Attending ED Notes:   This 74-year-old female who has a history of CKD and heart disease, presents complaining primarily of left leg pain after a fall out of bed yesterday.  The patient complains of diffuse pain with palpation  left leg and no palpable peripheral pulses all appreciated.  The patient did have arterial Doppler studies done showing no evidence of any flow in the posterior tibial peroneal or dorsalis pedis artery.  The common femoral artery profundus femoris, superficial femoral, popliteal and anterior tibial are patent.  The patient's chest x-ray showed a small right effusion.  Labs showed an H&H of 10.5 and 33.5.  The BUN and creatinine elevated consistent with a history of CKD.  The BUN is 42 and creatinine 4.3.  Potassium is normal.  There is some evidence of transaminitis.  The patient's chest x-ray showed a small effusion.  The patient's EKG showed atrial fibrillation with controlled ventricular rate.    Hospital Medicine is consulted for possible admission.                        Clinical Impression:       ICD-10-CM ICD-9-CM   1. Stage 5 chronic kidney disease on chronic dialysis N18.6 585.6    Z99.2 V45.11   2. Shortness of breath R06.02 786.05   3. Pain R52 780.96   4. Fall W19.XXXA E888.9   5. Leg pain M79.606 729.5   6. 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

## 2020-03-14 NOTE — ED NOTES
LOC: The patient is awake, alert, and oriented to place, time, situation. Affect is appropriate.  Speech is appropriate and clear.     APPEARANCE: Patient resting comfortably in no acute distress.  Patient is clean and well groomed.    SKIN: The skin is warm and dry; color consistent with ethnicity.  Patient has normal skin turgor and moist mucus membranes.  Bandaged wound on left upper thigh.    MUSCULOSKELETAL: Patient moving upper and lower extremities without difficulty.  Denies weakness.     RESPIRATORY: Airway is open and patent. Respirations spontaneous, even, easy, and non-labored.  Patient has a normal effort and rate.  No accessory muscle use noted. Denies cough. Reports SOB upon exertion. Lung sounds clear bilaterally.     CARDIAC:  Normal rhythm and rate noted.  Peripheral edema noted bilateral legs. No complaints of chest pain.      ABDOMEN: Soft and non tender to palpation.  No distention noted.     NEUROLOGIC: Eyes open spontaneously.  Behavior appropriate to situation.  Follows commands; facial expression symmetrical.  Purposeful motor response noted; normal sensation in all extremities.

## 2020-03-14 NOTE — Clinical Note
Angiography of the left lower extremity performed with the catheter   and hand injected with 25 mL of contrast. Multiple injections

## 2020-03-15 PROBLEM — I70.90 ACUTE OCCLUSION OF ARTERY: Status: ACTIVE | Noted: 2020-03-15

## 2020-03-15 LAB
ALBUMIN SERPL BCP-MCNC: 2.8 G/DL (ref 3.5–5.2)
ALP SERPL-CCNC: 165 U/L (ref 55–135)
ALT SERPL W/O P-5'-P-CCNC: 52 U/L (ref 10–44)
ANION GAP SERPL CALC-SCNC: 15 MMOL/L (ref 8–16)
APTT PPP: 70.9 SEC (ref 23.6–33.3)
APTT PPP: 75.1 SEC (ref 23.6–33.3)
APTT PPP: 97.8 SEC (ref 23.6–33.3)
AST SERPL-CCNC: 89 U/L (ref 10–40)
BASOPHILS # BLD AUTO: 0.02 K/UL (ref 0–0.2)
BASOPHILS # BLD AUTO: 0.02 K/UL (ref 0–0.2)
BASOPHILS NFR BLD: 0.2 % (ref 0–1.9)
BASOPHILS NFR BLD: 0.2 % (ref 0–1.9)
BILIRUB SERPL-MCNC: 1.3 MG/DL (ref 0.1–1)
BUN SERPL-MCNC: 28 MG/DL (ref 8–23)
CALCIUM SERPL-MCNC: 8.3 MG/DL (ref 8.7–10.5)
CHLORIDE SERPL-SCNC: 91 MMOL/L (ref 95–110)
CO2 SERPL-SCNC: 28 MMOL/L (ref 23–29)
CREAT SERPL-MCNC: 3.4 MG/DL (ref 0.5–1.4)
DIFFERENTIAL METHOD: ABNORMAL
DIFFERENTIAL METHOD: ABNORMAL
EOSINOPHIL # BLD AUTO: 0.1 K/UL (ref 0–0.5)
EOSINOPHIL # BLD AUTO: 0.1 K/UL (ref 0–0.5)
EOSINOPHIL NFR BLD: 0.6 % (ref 0–8)
EOSINOPHIL NFR BLD: 0.6 % (ref 0–8)
ERYTHROCYTE [DISTWIDTH] IN BLOOD BY AUTOMATED COUNT: 17.2 % (ref 11.5–14.5)
ERYTHROCYTE [DISTWIDTH] IN BLOOD BY AUTOMATED COUNT: 17.2 % (ref 11.5–14.5)
EST. GFR  (AFRICAN AMERICAN): 14.7 ML/MIN/1.73 M^2
EST. GFR  (NON AFRICAN AMERICAN): 12.8 ML/MIN/1.73 M^2
GLUCOSE SERPL-MCNC: 99 MG/DL (ref 70–110)
HCT VFR BLD AUTO: 32.7 % (ref 37–48.5)
HCT VFR BLD AUTO: 32.7 % (ref 37–48.5)
HGB BLD-MCNC: 9.7 G/DL (ref 12–16)
HGB BLD-MCNC: 9.7 G/DL (ref 12–16)
IMM GRANULOCYTES # BLD AUTO: 0.03 K/UL (ref 0–0.04)
IMM GRANULOCYTES # BLD AUTO: 0.03 K/UL (ref 0–0.04)
IMM GRANULOCYTES NFR BLD AUTO: 0.4 % (ref 0–0.5)
IMM GRANULOCYTES NFR BLD AUTO: 0.4 % (ref 0–0.5)
INR PPP: 4.6
LYMPHOCYTES # BLD AUTO: 0.9 K/UL (ref 1–4.8)
LYMPHOCYTES # BLD AUTO: 0.9 K/UL (ref 1–4.8)
LYMPHOCYTES NFR BLD: 10.6 % (ref 18–48)
LYMPHOCYTES NFR BLD: 10.6 % (ref 18–48)
MAGNESIUM SERPL-MCNC: 2.1 MG/DL (ref 1.6–2.6)
MCH RBC QN AUTO: 25.9 PG (ref 27–31)
MCH RBC QN AUTO: 25.9 PG (ref 27–31)
MCHC RBC AUTO-ENTMCNC: 29.7 G/DL (ref 32–36)
MCHC RBC AUTO-ENTMCNC: 29.7 G/DL (ref 32–36)
MCV RBC AUTO: 87 FL (ref 82–98)
MCV RBC AUTO: 87 FL (ref 82–98)
MONOCYTES # BLD AUTO: 0.8 K/UL (ref 0.3–1)
MONOCYTES # BLD AUTO: 0.8 K/UL (ref 0.3–1)
MONOCYTES NFR BLD: 9.6 % (ref 4–15)
MONOCYTES NFR BLD: 9.6 % (ref 4–15)
NEUTROPHILS # BLD AUTO: 6.7 K/UL (ref 1.8–7.7)
NEUTROPHILS # BLD AUTO: 6.7 K/UL (ref 1.8–7.7)
NEUTROPHILS NFR BLD: 78.6 % (ref 38–73)
NEUTROPHILS NFR BLD: 78.6 % (ref 38–73)
NRBC BLD-RTO: 0 /100 WBC
NRBC BLD-RTO: 0 /100 WBC
PLATELET # BLD AUTO: 170 K/UL (ref 150–350)
PLATELET # BLD AUTO: 170 K/UL (ref 150–350)
PMV BLD AUTO: 11.9 FL (ref 9.2–12.9)
PMV BLD AUTO: 11.9 FL (ref 9.2–12.9)
POTASSIUM SERPL-SCNC: 3.7 MMOL/L (ref 3.5–5.1)
PROT SERPL-MCNC: 7.3 G/DL (ref 6–8.4)
PROTHROMBIN TIME: 41.5 SEC (ref 10.6–14.8)
RBC # BLD AUTO: 3.75 M/UL (ref 4–5.4)
RBC # BLD AUTO: 3.75 M/UL (ref 4–5.4)
SODIUM SERPL-SCNC: 134 MMOL/L (ref 136–145)
WBC # BLD AUTO: 8.52 K/UL (ref 3.9–12.7)
WBC # BLD AUTO: 8.52 K/UL (ref 3.9–12.7)

## 2020-03-15 PROCEDURE — 83735 ASSAY OF MAGNESIUM: CPT

## 2020-03-15 PROCEDURE — 85025 COMPLETE CBC W/AUTO DIFF WBC: CPT

## 2020-03-15 PROCEDURE — 85730 THROMBOPLASTIN TIME PARTIAL: CPT | Mod: 91

## 2020-03-15 PROCEDURE — 63600175 PHARM REV CODE 636 W HCPCS: Performed by: STUDENT IN AN ORGANIZED HEALTH CARE EDUCATION/TRAINING PROGRAM

## 2020-03-15 PROCEDURE — 85610 PROTHROMBIN TIME: CPT

## 2020-03-15 PROCEDURE — 21400001 HC TELEMETRY ROOM

## 2020-03-15 PROCEDURE — 36415 COLL VENOUS BLD VENIPUNCTURE: CPT

## 2020-03-15 PROCEDURE — 25000003 PHARM REV CODE 250: Performed by: INTERNAL MEDICINE

## 2020-03-15 PROCEDURE — 63600175 PHARM REV CODE 636 W HCPCS: Performed by: INTERNAL MEDICINE

## 2020-03-15 PROCEDURE — 94761 N-INVAS EAR/PLS OXIMETRY MLT: CPT

## 2020-03-15 PROCEDURE — 80053 COMPREHEN METABOLIC PANEL: CPT

## 2020-03-15 RX ADMIN — METOPROLOL SUCCINATE 50 MG: 50 TABLET, FILM COATED, EXTENDED RELEASE ORAL at 09:03

## 2020-03-15 RX ADMIN — HYDROMORPHONE HYDROCHLORIDE 0.5 MG: 1 INJECTION, SOLUTION INTRAMUSCULAR; INTRAVENOUS; SUBCUTANEOUS at 01:03

## 2020-03-15 RX ADMIN — HEPARIN SODIUM 14 UNITS/KG/HR: 10000 INJECTION, SOLUTION INTRAVENOUS at 10:03

## 2020-03-15 RX ADMIN — DILTIAZEM HYDROCHLORIDE 180 MG: 180 CAPSULE, COATED, EXTENDED RELEASE ORAL at 09:03

## 2020-03-15 RX ADMIN — HYDROCODONE BITARTRATE AND ACETAMINOPHEN 1 TABLET: 7.5; 325 TABLET ORAL at 12:03

## 2020-03-15 RX ADMIN — ASPIRIN 81 MG: 81 TABLET, DELAYED RELEASE ORAL at 09:03

## 2020-03-15 NOTE — PLAN OF CARE
03/15/20 0942   CORTEZ Message   Medicare Outpatient and Observation Notification regarding financial responsibility Given to patient/caregiver;Explained to patient/caregiver;Signed/date by patient/caregiver   Date CORTEZ was signed 03/15/20   Time CORTEZ was signed [id number]

## 2020-03-15 NOTE — H&P
"[facility] Medicine  History & Physical    Patient Name: Griselda Neely  MRN: [id number]  Admission Date: 3/14/2020  Attending Physician: Franky Maria MD   Primary Care Provider: Kalie Neely MD         Patient information was obtained from patient and ER records.     Subjective:     Principal Problem:Arterial occlusion due to arteriosclerosis    Chief Complaint:   Chief Complaint   Patient presents with    Shortness of Breath     "fluid overload / went to dialysis yesterday and was told had fluid on lungs"         HPI: 74 year old patient getting admitted with LLE arterial occlusion  Patient started having pain few days ago. 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  Patient recently had episodes of Shortness of breath and was dialyzed back to back for certain days  Denies any other issues    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    both legs    CHF (congestive heart failure)     Encounter for blood transfusion 03/2016    Gout     Hypertension     Mitral valve regurgitation     Osteoarthritis     Pancreatitis     Peripheral vascular disease     Peritoneal dialysis catheter in place     Pneumonia 09/09/2017    Renal failure        Past Surgical History:   Procedure Laterality Date    ACHILLES TENDON SURGERY Right     ANGIOGRAPHY OF ARTERIOVENOUS SHUNT Right 1/7/2020    Procedure: Fistulogram with Possible Intervention;  Surgeon: Joseph Loyola MD;  Location: [facility] CATH/EP LAB;  Service: Cardiology;  Laterality: Right;    APPENDECTOMY      CARDIAC CATHETERIZATION  07/03/2017    CHOLECYSTECTOMY      heal surgery Right     HYSTERECTOMY      INSERTION OF STENT INTO PERIPHERAL VESSEL N/A 1/7/2020    Procedure: INSERTION, STENT, VESSEL, PERIPHERAL;  Surgeon: Joseph Loyola MD;  Location: [facility] CATH/EP LAB;  Service: Cardiology;  Laterality: N/A;    PARATHYROIDECTOMY      " PARATHYROIDECTOMY  07/13/2017    PERCUTANEOUS TRANSLUMINAL ANGIOPLASTY OF ARTERIOVENOUS FISTULA N/A 1/7/2020    Procedure: PTA, AV FISTULA;  Surgeon: Joseph Loyola MD;  Location: [facility] CATH/EP LAB;  Service: Cardiology;  Laterality: N/A;    PERITONEAL CATHETER INSERTION      RENAL BIOPSY      vocal cord nodule         Review of patient's allergies indicates:  No Known Allergies    No current facility-administered medications on file prior to encounter.      Current Outpatient Medications on File Prior to Encounter   Medication Sig    aspirin (ECOTRIN) 81 MG EC tablet Take 81 mg by mouth once daily.    calcium acetate (PHOSLO) 667 mg capsule Take 667 mg by mouth.    diltiaZEM (CARDIZEM CD) 180 MG 24 hr capsule Take 180 mg by mouth once daily.    HYDROcodone-acetaminophen (NORCO) 7.5-325 mg per tablet Take 1 tablet by mouth every 6 (six) hours as needed for Pain.    isosorbide mononitrate (IMDUR) 30 MG 24 hr tablet Take 30 mg by mouth once daily.    losartan (COZAAR) 25 MG tablet Take 25 mg by mouth once daily.    magnesium oxide (MAG-OX) 400 mg (241.3 mg magnesium) tablet Take 400 mg by mouth once daily.    metoprolol succinate (TOPROL-XL) 50 MG 24 hr tablet Take 1 tablet (50 mg total) by mouth once daily. (Patient taking differently: Take 50 mg by mouth once daily. )    mupirocin (BACTROBAN) 2 % ointment Apply topically 3 (three) times daily.    ondansetron (ZOFRAN-ODT) 4 MG TbDL Take 1 tablet (4 mg total) by mouth every 6 (six) hours as needed.    oxyCODONE-acetaminophen (PERCOCET)  mg per tablet Take 1 tablet by mouth 2 (two) times daily as needed.    traMADol (ULTRAM) 50 mg tablet Take 1 tablet (50 mg total) by mouth 3 (three) times a week. 3 TIMES A WEEK AS NEEDED DURING HEMODIALYSIS    warfarin (COUMADIN) 2.5 MG tablet Take 1 tablet (2.5 mg total) by mouth Daily.     Family History     Problem Relation (Age of Onset)    Arthritis Mother    Diabetes Mother, Father    Early death Sister,  Sister    Heart disease Sister, Maternal Grandfather, Mother    Heart failure Mother    Hypertension Mother        Tobacco Use    Smoking status: Current Some Day Smoker     Packs/day: 0.50     Years: 45.00     Pack years: 22.50     Types: Cigarettes    Smokeless tobacco: Never Used   Substance and Sexual Activity    Alcohol use: No    Drug use: No    Sexual activity: Not on file     Review of Systems   Constitutional: Negative for activity change and appetite change.   HENT: Negative for congestion and dental problem.    Eyes: Negative for discharge and itching.   Respiratory: Negative for shortness of breath.    Cardiovascular: Negative for chest pain.   Gastrointestinal: Negative for abdominal distention and abdominal pain.   Endocrine: Negative for cold intolerance.   Genitourinary: Negative for difficulty urinating and dysuria.   Musculoskeletal: Positive for myalgias. Negative for arthralgias and back pain.   Skin: Negative for color change.   Neurological: Negative for dizziness and facial asymmetry.   Hematological: Negative for adenopathy.   Psychiatric/Behavioral: Negative for agitation and behavioral problems.     Objective:     Vital Signs (Most Recent):  Temp: 97.9 °F (36.6 °C) (03/14/20 1920)  Pulse: 92 (03/14/20 1920)  Resp: 18 (03/14/20 1920)  BP: 112/77 (03/14/20 1920)  SpO2: 100 % (03/14/20 1920) Vital Signs (24h Range):  Temp:  [97.4 °F (36.3 °C)-98.2 °F (36.8 °C)] 97.9 °F (36.6 °C)  Pulse:  [60-96] 92  Resp:  [14-22] 18  SpO2:  [93 %-100 %] 100 %  BP: ()/(52-98) 112/77     Weight: 66.5 kg (146 lb 9.6 oz)  Body mass index is 24.4 kg/m².    Physical Exam   Constitutional: She is oriented to person, place, and time. No distress.   HENT:   Right Ear: External ear normal.   Left Ear: External ear normal.   Eyes: Pupils are equal, round, and reactive to light. EOM are normal.   Neck: Neck supple.   Cardiovascular: Normal rate.   Pulmonary/Chest: Effort normal and breath sounds normal.    Abdominal: Soft. Bowel sounds are normal.   Musculoskeletal: Normal range of motion. She exhibits no edema.   Reduced pulses on LLE  LLE warm not cold   Neurological: She is alert and oriented to person, place, and time.   Skin: Skin is warm.   Psychiatric: She has a normal mood and affect.   Nursing note and vitals reviewed.        CRANIAL NERVES     CN III, IV, VI   Pupils are equal, round, and reactive to light.  Extraocular motions are normal.        Significant Labs:   CBC:   Recent Labs   Lab 03/14/20  0548 03/14/20  1217   WBC 9.52 9.52   HGB 10.5* 9.0*   HCT 33.5* 29.0*    172     CMP:   Recent Labs   Lab 03/14/20  0548   *   K 4.6   CL 90*   CO2 31*   GLU 98   BUN 42*   CREATININE 4.3*   CALCIUM 8.5*   PROT 7.4   ALBUMIN 2.9*   BILITOT 1.8*   ALKPHOS 155*   *   ALT 60*   ANIONGAP 14   EGFRNONAA 9.6*       Significant Imaging: I have reviewed all pertinent imaging results/findings within the past 24 hours.    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 INR already High  Vascular surgeon will be consulted      Left atrial thrombus  On Coumadin at home      HLD (hyperlipidemia)  Continue statins      Chronic respiratory failure  On 3 L o2 at Home      Other persistent atrial fibrillation  Persistent A Fib  Continue usual medications      Calciphylaxis  Chronic ongoing issue in the background of end-stage renal disease  Pain medications p.r.n. basis    ESRD (end stage renal disease) on HD M,W, F  Consult Pts Nephro MD      Tobacco dependence  Tobacco cessation counselling done      HTN (hypertension)  Continue Present medications        VTE Risk Mitigation (From admission, onward)         Ordered     heparin 25,000 units in dextrose 5%  250 mL (100 units/mL) infusion [facility]  Continuous     Question:  Heparin Infusion Adjustment (DO NOT MODIFY ANSWER)  Answer:  [email or web address]\epic\Images\Pharmacy\HeparinInfusions\heparin HIGH INTENSITY nomogram for [facility] [id number]    03/14/20 1157     heparin 25,000 units in dextrose 5% (100 units/ml) IV bolus from bag - ADDITIONAL PRN BOLUS - 60 units/kg  As needed (PRN)     Question:  Heparin Infusion Adjustment (DO NOT MODIFY ANSWER)  Answer:  [email or web address]\epic\Images\Pharmacy\HeparinInfusions\heparin HIGH INTENSITY nomogram for [facility] [id number].pdf    03/14/20 1157     heparin 25,000 units in dextrose 5% (100 units/ml) IV bolus from bag - ADDITIONAL PRN BOLUS - 30 units/kg  As needed (PRN)     Question:  Heparin Infusion Adjustment (DO NOT MODIFY ANSWER)  Answer:  [email or web address]\epic\Images\Pharmacy\HeparinInfusions\heparin HIGH INTENSITY nomogram for [facility] [id number].pdf    03/14/20 1157     IP VTE HIGH RISK PATIENT  Once      03/14/20 1157                   Franky Maria MD  Department of Hospital Medicine   [facility]

## 2020-03-15 NOTE — CONSULTS
Pending sale to [facility]  Vascular Surgery  Consult Note    Inpatient consult to Vascular Surgery  Consult performed by: Ali Khoobehi, MD  Consult ordered by: Franky Maria MD        Subjective:     Chief Complaint/Reason for Admission: Left foot pain    History of Present Illness: Pt admitted c/o left foot pain, she is unsure of for how many days, though sister believes it was for 3 days. She normally walks with the aid of a walker. She has a hx of ESRD with calciphylaxis and leg wounds. She has no other complaints. Pt had a therapeutic INR on admission, for hx of left atrial thrombus. Pt has seen Dr Loyola in the past for his right arm avf.    Medications Prior to Admission   Medication Sig Dispense Refill Last Dose    aspirin (ECOTRIN) 81 MG EC tablet Take 81 mg by mouth once daily.   3/13/2020    calcium acetate (PHOSLO) 667 mg capsule Take 667 mg by mouth.   3/13/2020    diltiaZEM (CARDIZEM CD) 180 MG 24 hr capsule Take 180 mg by mouth once daily.   3/13/2020    HYDROcodone-acetaminophen (NORCO) 7.5-325 mg per tablet Take 1 tablet by mouth every 6 (six) hours as needed for Pain.   Past Week    isosorbide mononitrate (IMDUR) 30 MG 24 hr tablet Take 30 mg by mouth once daily.   3/13/2020    losartan (COZAAR) 25 MG tablet Take 25 mg by mouth once daily.   3/13/2020    magnesium oxide (MAG-OX) 400 mg (241.3 mg magnesium) tablet Take 400 mg by mouth once daily.   3/13/2020    metoprolol succinate (TOPROL-XL) 50 MG 24 hr tablet Take 1 tablet (50 mg total) by mouth once daily. (Patient taking differently: Take 50 mg by mouth once daily. ) 30 tablet 11 3/13/2020    mupirocin (BACTROBAN) 2 % ointment Apply topically 3 (three) times daily. 30 g 2 Past Month    ondansetron (ZOFRAN-ODT) 4 MG TbDL Take 1 tablet (4 mg total) by mouth every 6 (six) hours as needed. 60 tablet 4 3/13/2020    oxyCODONE-acetaminophen (PERCOCET)  mg per tablet Take 1 tablet by mouth 2 (two) times daily as needed.   3/13/2020     traMADol (ULTRAM) 50 mg tablet Take 1 tablet (50 mg total) by mouth 3 (three) times a week. 3 TIMES A WEEK AS NEEDED DURING HEMODIALYSIS 90 tablet 3 3/13/2020    warfarin (COUMADIN) 2.5 MG tablet Take 1 tablet (2.5 mg total) by mouth Daily. 30 tablet 1 3/13/2020       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    both legs    CHF (congestive heart failure)     Encounter for blood transfusion 03/2016    Gout     Hypertension     Mitral valve regurgitation     Osteoarthritis     Pancreatitis     Peripheral vascular disease     Peritoneal dialysis catheter in place     Pneumonia 09/09/2017    Renal failure      Past Surgical History:   Procedure Laterality Date    ACHILLES TENDON SURGERY Right     ANGIOGRAPHY OF ARTERIOVENOUS SHUNT Right 1/7/2020    Procedure: Fistulogram with Possible Intervention;  Surgeon: Joseph Loyola MD;  Location: [facility] CATH/EP LAB;  Service: Cardiology;  Laterality: Right;    APPENDECTOMY      CARDIAC CATHETERIZATION  07/03/2017    CHOLECYSTECTOMY      heal surgery Right     HYSTERECTOMY      INSERTION OF STENT INTO PERIPHERAL VESSEL N/A 1/7/2020    Procedure: INSERTION, STENT, VESSEL, PERIPHERAL;  Surgeon: Joseph Loyola MD;  Location: [facility] CATH/EP LAB;  Service: Cardiology;  Laterality: N/A;    PARATHYROIDECTOMY      PARATHYROIDECTOMY  07/13/2017    PERCUTANEOUS TRANSLUMINAL ANGIOPLASTY OF ARTERIOVENOUS FISTULA N/A 1/7/2020    Procedure: PTA, AV FISTULA;  Surgeon: Joseph Loyola MD;  Location: [facility] CATH/EP LAB;  Service: Cardiology;  Laterality: N/A;    PERITONEAL CATHETER INSERTION      RENAL BIOPSY      vocal cord nodule       Family History     Problem Relation (Age of Onset)    Arthritis Mother    Diabetes Mother, Father    Early death Sister, Sister    Heart disease Sister, Maternal Grandfather, Mother    Heart failure Mother    Hypertension Mother         Tobacco Use    Smoking status: Current Some Day Smoker     Packs/day: 0.50     Years: 45.00     Pack years: 22.50     Types: Cigarettes    Smokeless tobacco: Never Used   Substance and Sexual Activity    Alcohol use: No    Drug use: No    Sexual activity: Not on file     Review of Systems   Constitutional: Positive for activity change. Negative for appetite change.   Respiratory: Negative for cough and chest tightness.    Cardiovascular: Negative for chest pain and leg swelling.   Gastrointestinal: Negative for abdominal pain.   All other systems reviewed and are negative.    Objective:     Vital Signs (Most Recent):  Temp: 97.8 °F (36.6 °C) (03/15/20 1100)  Pulse: 102 (03/15/20 1100)  Resp: 16 (03/15/20 1100)  BP: 132/80 (03/15/20 1100)  SpO2: 100 % (03/15/20 1100) Vital Signs (24h Range):  Temp:  [97.4 °F (36.3 °C)-97.9 °F (36.6 °C)] 97.8 °F (36.6 °C)  Pulse:  [] 102  Resp:  [16-20] 16  SpO2:  [95 %-100 %] 100 %  BP: (104-137)/(60-86) 132/80     Weight: 59.9 kg (132 lb 1.6 oz)  Body mass index is 21.98 kg/m².    Date 03/15/20 0700 - 03/16/20 0659   Shift 6489-4629 8984-8658 7932-4061 24 Hour Total   INTAKE   P.O. 360   360   Shift Total(mL/kg) 360(6)   360(6)   OUTPUT   Shift Total(mL/kg)       Weight (kg) 59.9 59.9 59.9 59.9       Physical Exam   Constitutional: She is oriented to person, place, and time. She appears well-developed and well-nourished.   HENT:   Head: Normocephalic and atraumatic.   Eyes: Pupils are equal, round, and reactive to light.   Neck: Neck supple.   Cardiovascular: Normal rate and regular rhythm.   Pulses:       Radial pulses are 2+ on the right side, and 2+ on the left side.        Femoral pulses are 2+ on the right side, and 2+ on the left side.  DP and PT signals RLE  No signals LLE  Left foot motor and sensory intact  Right arm avf +thrill/bruit   Pulmonary/Chest: Effort normal.   Abdominal: Soft. There is no tenderness.   Neurological: She is alert and oriented to  person, place, and time.   Nursing note and vitals reviewed.      Significant Labs:  CBC:   Recent Labs   Lab 03/15/20  0228   WBC 8.52  8.52   RBC 3.75*  3.75*   HGB 9.7*  9.7*   HCT 32.7*  32.7*     170   MCV 87  87   MCH 25.9*  25.9*   MCHC 29.7*  29.7*     CMP:   Recent Labs   Lab 03/15/20  0228   GLU 99   CALCIUM 8.3*   ALBUMIN 2.8*   PROT 7.3   *   K 3.7   CO2 28   CL 91*   BUN 28*   CREATININE 3.4*   ALKPHOS 165*   ALT 52*   AST 89*   BILITOT 1.3*     Coagulation:   Recent Labs   Lab 03/15/20  0228 03/15/20  0824   INR 4.6  --    APTT 97.8* 70.9*       Significant Diagnostics:  I have reviewed all pertinent imaging results/findings within the past 24 hours.    Assessment/Plan:   Pt with ESRD, LA thrombus admitted with subacute pain sx to the left foot. US shos occlusion of the tibial vessels in the left leg.    She will need angiography and possible endovascular intervention if feasible. I d/w the patient and her sister at [facility]. She is agreeable for the procedure.    -NPO p MN for OR.    Active Diagnoses:    Diagnosis Date Noted POA    PRINCIPAL PROBLEM:  Arterial occlusion due to arteriosclerosis [I70.90] 03/14/2020 Yes    Acute occlusion of artery [I70.90] 03/15/2020 Yes    Left atrial thrombus [I51.3] 01/03/2020 Yes    HLD (hyperlipidemia) [E78.5] 12/20/2019 Yes     Chronic    Chronic respiratory failure [J96.10] 05/13/2019 Yes     Chronic    Calciphylaxis [E83.59] 07/18/2017 Yes     Chronic    Other persistent atrial fibrillation [I48.19] 07/18/2017 Yes    ESRD (end stage renal disease) on HD M,W, F [N18.6] 05/11/2016 Yes     Chronic    Tobacco dependence [F17.200] 09/09/2013 Yes     Chronic    HTN (hypertension) [I10] 08/15/2013 Yes     Chronic      Problems Resolved During this Admission:       Thank you for your consult. I will follow-up with patient. Please contact us if you have any additional questions.    Ali Khoobehi, MD  Vascular Surgery  [facility]  [facility]

## 2020-03-15 NOTE — NURSING
HD complete. Total UF 2 liters. Pt tolerated well. Removed hd needles x 2. Gauze dressing secured with tape. Light pressure held. Hemostasis achieved. Report given to Etelvina Teran RN.

## 2020-03-15 NOTE — HPI
74 year old patient getting admitted with LLE arterial occlusion  Patient started having pain few days ago. 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

## 2020-03-15 NOTE — CONSULTS
HPI: 74 year old patient getting admitted with LLE arterial occlusion  Patient started having pain few days ago. 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  Patient recently had episodes of Shortness of breath and was dialyzed back to back for certain days  Denies any other issues          Past Medical History:   Diagnosis Date    Anemia      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     both legs    CHF (congestive heart failure)      Encounter for blood transfusion 03/2016    Gout      Hypertension      Mitral valve regurgitation      Osteoarthritis      Pancreatitis      Peripheral vascular disease      Peritoneal dialysis catheter in place      Pneumonia 09/09/2017    Renal failure                 Past Surgical History:   Procedure Laterality Date    ACHILLES TENDON SURGERY Right      ANGIOGRAPHY OF ARTERIOVENOUS SHUNT Right 1/7/2020     Procedure: Fistulogram with Possible Intervention;  Surgeon: Joseph Loyola MD;  Location: [facility] CATH/EP LAB;  Service: Cardiology;  Laterality: Right;    APPENDECTOMY        CARDIAC CATHETERIZATION   07/03/2017    CHOLECYSTECTOMY        heal surgery Right      HYSTERECTOMY        INSERTION OF STENT INTO PERIPHERAL VESSEL N/A 1/7/2020     Procedure: INSERTION, STENT, VESSEL, PERIPHERAL;  Surgeon: Joseph Loyola MD;  Location: [facility] CATH/EP LAB;  Service: Cardiology;  Laterality: N/A;    PARATHYROIDECTOMY        PARATHYROIDECTOMY   07/13/2017    PERCUTANEOUS TRANSLUMINAL ANGIOPLASTY OF ARTERIOVENOUS FISTULA N/A 1/7/2020     Procedure: PTA, AV FISTULA;  Surgeon: Joseph Loyola MD;  Location: [facility] CATH/EP LAB;  Service: Cardiology;  Laterality: N/A;    PERITONEAL CATHETER INSERTION        RENAL BIOPSY        vocal cord nodule             Review of patient's allergies indicates:  No Known Allergies     No current facility-administered medications on file prior to encounter.            Current Outpatient Medications  on File Prior to Encounter   Medication Sig    aspirin (ECOTRIN) 81 MG EC tablet Take 81 mg by mouth once daily.    calcium acetate (PHOSLO) 667 mg capsule Take 667 mg by mouth.    diltiaZEM (CARDIZEM CD) 180 MG 24 hr capsule Take 180 mg by mouth once daily.    HYDROcodone-acetaminophen (NORCO) 7.5-325 mg per tablet Take 1 tablet by mouth every 6 (six) hours as needed for Pain.    isosorbide mononitrate (IMDUR) 30 MG 24 hr tablet Take 30 mg by mouth once daily.    losartan (COZAAR) 25 MG tablet Take 25 mg by mouth once daily.    magnesium oxide (MAG-OX) 400 mg (241.3 mg magnesium) tablet Take 400 mg by mouth once daily.    metoprolol succinate (TOPROL-XL) 50 MG 24 hr tablet Take 1 tablet (50 mg total) by mouth once daily. (Patient taking differently: Take 50 mg by mouth once daily. )    mupirocin (BACTROBAN) 2 % ointment Apply topically 3 (three) times daily.    ondansetron (ZOFRAN-ODT) 4 MG TbDL Take 1 tablet (4 mg total) by mouth every 6 (six) hours as needed.    oxyCODONE-acetaminophen (PERCOCET)  mg per tablet Take 1 tablet by mouth 2 (two) times daily as needed.    traMADol (ULTRAM) 50 mg tablet Take 1 tablet (50 mg total) by mouth 3 (three) times a week. 3 TIMES A WEEK AS NEEDED DURING HEMODIALYSIS    warfarin (COUMADIN) 2.5 MG tablet Take 1 tablet (2.5 mg total) by mouth Daily.           Family History      Problem Relation (Age of Onset)     Arthritis Mother     Diabetes Mother, Father     Early death Sister, Sister     Heart disease Sister, Maternal Grandfather, Mother     Heart failure Mother     Hypertension Mother                Tobacco Use    Smoking status: Current Some Day Smoker       Packs/day: 0.50       Years: 45.00       Pack years: 22.50       Types: Cigarettes    Smokeless tobacco: Never Used   Substance and Sexual Activity    Alcohol use: No    Drug use: No    Sexual activity: Not on file      Review of Systems   Constitutional: Negative for activity change and appetite  change.   HENT: Negative for congestion and dental problem.    Eyes: Negative for discharge and itching.   Respiratory: SOB and rest and ARGUELLO better from earlier in week but still worse from baseline.    Cardiovascular: Negative for chest pain, palpitations. No orthopnea  Gastrointestinal: Negative for abdominal distention and abdominal pain.   Musculoskeletal: Positive for myalgias. Negative for arthralgias and back pain.   Skin: Negative for color change.   Neurological: Negative for dizziness and facial asymmetry.           PE:  AA  NAD  HOB @ 30 deg with stable B JVD  No cervical LAD  irreg irreg, reg rate. No rub. + BLE edema  Stable B rales. No crackles  S/nt abd    Vital Signs      Report   View Graph    03/13 0700 03/14 0659 03/14 0700 03/14 2240  Most Recent     Temp (°F) 98.2 97.4-97.9  97.9 (36.6)  03/14 1920   Pulse 81-96 60-96  92  03/14 1920   Resp 14-22 14-21  18  03/14 1920   //98 98//70  112/77  03/14 1920   MAP (mmHg)  75-94  89  03/14 1920   SpO2 (%)    100  03/14 1920   Weight (kg) 65.8 66.5  66.5 kg (146 lb 9.6 oz)  03/14 1351   Intake/Output      Report   View Table   Net  -2,000mL  (-2,000 mL / 66.5 kg = -30.1 mL/kg)  3/14 0700 - 3/15 0659  [id number]/14  2,500  500  --  -2,000  500  (7.5)  2,500  Other  In  Out  Other  Ne        LABS:  Selected Labs      Report   (Up to last 2 results from the past 72 hours)   [facility]    03/14 0548 03/14 1217   Sodium 135Low      --       Potassium 4.6     --       Chloride 90Low      --       CO2 31High      --       BUN, Bld 42High      --       Creatinine 4.3High      --       Glucose 98     --       Magnesium 2.2     --       WBC 9.52     9.52       Hemoglobin 10.5Low      9.0Low        Hematocrit 33.5Low      29.0Low        Platelets 200     172       INR 5.2High Panic       4.9       BNP >4,500High      --       Troponin I 0.056High Panic                            A/P:  A/w ongoing SOB from outpatient HD  despite extra UF treatments. Better after UF on admit. BP stable.        Pulmonary edema, decompensated CHF  Better clinically  F/u AM    ESRD  Re assess for HD/ UF in AM  Otherwise, will maintain MWF regimen    HTN  Better with UF  Monitor    S/p MVR  On coumadin    Anemia  DONNY with HD

## 2020-03-15 NOTE — SUBJECTIVE & OBJECTIVE
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    both legs    CHF (congestive heart failure)     Encounter for blood transfusion 03/2016    Gout     Hypertension     Mitral valve regurgitation     Osteoarthritis     Pancreatitis     Peripheral vascular disease     Peritoneal dialysis catheter in place     Pneumonia 09/09/2017    Renal failure        Past Surgical History:   Procedure Laterality Date    ACHILLES TENDON SURGERY Right     ANGIOGRAPHY OF ARTERIOVENOUS SHUNT Right 1/7/2020    Procedure: Fistulogram with Possible Intervention;  Surgeon: Joseph Loyola MD;  Location: [facility] CATH/EP LAB;  Service: Cardiology;  Laterality: Right;    APPENDECTOMY      CARDIAC CATHETERIZATION  07/03/2017    CHOLECYSTECTOMY      heal surgery Right     HYSTERECTOMY      INSERTION OF STENT INTO PERIPHERAL VESSEL N/A 1/7/2020    Procedure: INSERTION, STENT, VESSEL, PERIPHERAL;  Surgeon: Joseph Loyola MD;  Location: [facility] CATH/EP LAB;  Service: Cardiology;  Laterality: N/A;    PARATHYROIDECTOMY      PARATHYROIDECTOMY  07/13/2017    PERCUTANEOUS TRANSLUMINAL ANGIOPLASTY OF ARTERIOVENOUS FISTULA N/A 1/7/2020    Procedure: PTA, AV FISTULA;  Surgeon: Joseph Loyola MD;  Location: [facility] CATH/EP LAB;  Service: Cardiology;  Laterality: N/A;    PERITONEAL CATHETER INSERTION      RENAL BIOPSY      vocal cord nodule         Review of patient's allergies indicates:  No Known Allergies    No current facility-administered medications on file prior to encounter.      Current Outpatient Medications on File Prior to Encounter   Medication Sig    aspirin (ECOTRIN) 81 MG EC tablet Take 81 mg by mouth once daily.    calcium acetate (PHOSLO) 667 mg capsule Take 667 mg by mouth.    diltiaZEM (CARDIZEM CD) 180 MG 24 hr capsule Take 180 mg by mouth once daily.    HYDROcodone-acetaminophen (NORCO) 7.5-325 mg per tablet Take 1 tablet by mouth every 6 (six) hours as needed for Pain.     isosorbide mononitrate (IMDUR) 30 MG 24 hr tablet Take 30 mg by mouth once daily.    losartan (COZAAR) 25 MG tablet Take 25 mg by mouth once daily.    magnesium oxide (MAG-OX) 400 mg (241.3 mg magnesium) tablet Take 400 mg by mouth once daily.    metoprolol succinate (TOPROL-XL) 50 MG 24 hr tablet Take 1 tablet (50 mg total) by mouth once daily. (Patient taking differently: Take 50 mg by mouth once daily. )    mupirocin (BACTROBAN) 2 % ointment Apply topically 3 (three) times daily.    ondansetron (ZOFRAN-ODT) 4 MG TbDL Take 1 tablet (4 mg total) by mouth every 6 (six) hours as needed.    oxyCODONE-acetaminophen (PERCOCET)  mg per tablet Take 1 tablet by mouth 2 (two) times daily as needed.    traMADol (ULTRAM) 50 mg tablet Take 1 tablet (50 mg total) by mouth 3 (three) times a week. 3 TIMES A WEEK AS NEEDED DURING HEMODIALYSIS    warfarin (COUMADIN) 2.5 MG tablet Take 1 tablet (2.5 mg total) by mouth Daily.     Family History     Problem Relation (Age of Onset)    Arthritis Mother    Diabetes Mother, Father    Early death Sister, Sister    Heart disease Sister, Maternal Grandfather, Mother    Heart failure Mother    Hypertension Mother        Tobacco Use    Smoking status: Current Some Day Smoker     Packs/day: 0.50     Years: 45.00     Pack years: 22.50     Types: Cigarettes    Smokeless tobacco: Never Used   Substance and Sexual Activity    Alcohol use: No    Drug use: No    Sexual activity: Not on file     Review of Systems   Constitutional: Negative for activity change and appetite change.   HENT: Negative for congestion and dental problem.    Eyes: Negative for discharge and itching.   Respiratory: Negative for shortness of breath.    Cardiovascular: Negative for chest pain.   Gastrointestinal: Negative for abdominal distention and abdominal pain.   Endocrine: Negative for cold intolerance.   Genitourinary: Negative for difficulty urinating and dysuria.   Musculoskeletal: Positive for  myalgias. Negative for arthralgias and back pain.   Skin: Negative for color change.   Neurological: Negative for dizziness and facial asymmetry.   Hematological: Negative for adenopathy.   Psychiatric/Behavioral: Negative for agitation and behavioral problems.     Objective:     Vital Signs (Most Recent):  Temp: 97.9 °F (36.6 °C) (03/14/20 1920)  Pulse: 92 (03/14/20 1920)  Resp: 18 (03/14/20 1920)  BP: 112/77 (03/14/20 1920)  SpO2: 100 % (03/14/20 1920) Vital Signs (24h Range):  Temp:  [97.4 °F (36.3 °C)-98.2 °F (36.8 °C)] 97.9 °F (36.6 °C)  Pulse:  [60-96] 92  Resp:  [14-22] 18  SpO2:  [93 %-100 %] 100 %  BP: ()/(52-98) 112/77     Weight: 66.5 kg (146 lb 9.6 oz)  Body mass index is 24.4 kg/m².    Physical Exam   Constitutional: She is oriented to person, place, and time. No distress.   HENT:   Right Ear: External ear normal.   Left Ear: External ear normal.   Eyes: Pupils are equal, round, and reactive to light. EOM are normal.   Neck: Neck supple.   Cardiovascular: Normal rate.   Pulmonary/Chest: Effort normal and breath sounds normal.   Abdominal: Soft. Bowel sounds are normal.   Musculoskeletal: Normal range of motion. She exhibits no edema.   Reduced pulses on LLE  LLE warm not cold   Neurological: She is alert and oriented to person, place, and time.   Skin: Skin is warm.   Psychiatric: She has a normal mood and affect.   Nursing note and vitals reviewed.        CRANIAL NERVES     CN III, IV, VI   Pupils are equal, round, and reactive to light.  Extraocular motions are normal.        Significant Labs:   CBC:   Recent Labs   Lab 03/14/20  0548 03/14/20  1217   WBC 9.52 9.52   HGB 10.5* 9.0*   HCT 33.5* 29.0*    172     CMP:   Recent Labs   Lab 03/14/20  0548   *   K 4.6   CL 90*   CO2 31*   GLU 98   BUN 42*   CREATININE 4.3*   CALCIUM 8.5*   PROT 7.4   ALBUMIN 2.9*   BILITOT 1.8*   ALKPHOS 155*   *   ALT 60*   ANIONGAP 14   EGFRNONAA 9.6*       Significant Imaging: I have reviewed all  pertinent imaging results/findings within the past 24 hours.

## 2020-03-15 NOTE — ASSESSMENT & PLAN NOTE
Patient admitted with severe PVD of LLE  No Doppler flow identified within posterior tibial, peroneal, or dorsalis pedis arteries consistent with occlusion per [facility] report  Will start pt on heparin infusion and Follow H protocol . INR already High  Vascular surgeon will be consulted

## 2020-03-16 LAB
ALBUMIN SERPL BCP-MCNC: 2.7 G/DL (ref 3.5–5.2)
ALP SERPL-CCNC: 163 U/L (ref 55–135)
ALT SERPL W/O P-5'-P-CCNC: 39 U/L (ref 10–44)
ANION GAP SERPL CALC-SCNC: 17 MMOL/L (ref 8–16)
APTT PPP: 107.7 SEC (ref 23.6–33.3)
APTT PPP: 56.9 SEC (ref 23.6–33.3)
APTT PPP: 66.7 SEC (ref 23.6–33.3)
AST SERPL-CCNC: 47 U/L (ref 10–40)
BASOPHILS # BLD AUTO: 0.02 K/UL (ref 0–0.2)
BASOPHILS NFR BLD: 0.3 % (ref 0–1.9)
BILIRUB SERPL-MCNC: 1.1 MG/DL (ref 0.1–1)
BUN SERPL-MCNC: 39 MG/DL (ref 8–23)
CALCIUM SERPL-MCNC: 8.1 MG/DL (ref 8.7–10.5)
CHLORIDE SERPL-SCNC: 90 MMOL/L (ref 95–110)
CO2 SERPL-SCNC: 25 MMOL/L (ref 23–29)
CREAT SERPL-MCNC: 4.3 MG/DL (ref 0.5–1.4)
DIFFERENTIAL METHOD: ABNORMAL
EOSINOPHIL # BLD AUTO: 0.1 K/UL (ref 0–0.5)
EOSINOPHIL NFR BLD: 0.8 % (ref 0–8)
ERYTHROCYTE [DISTWIDTH] IN BLOOD BY AUTOMATED COUNT: 17.2 % (ref 11.5–14.5)
EST. GFR  (AFRICAN AMERICAN): 11.1 ML/MIN/1.73 M^2
EST. GFR  (NON AFRICAN AMERICAN): 9.6 ML/MIN/1.73 M^2
GLUCOSE SERPL-MCNC: 94 MG/DL (ref 70–110)
HCT VFR BLD AUTO: 32.6 % (ref 37–48.5)
HGB BLD-MCNC: 10.1 G/DL (ref 12–16)
IMM GRANULOCYTES # BLD AUTO: 0.06 K/UL (ref 0–0.04)
IMM GRANULOCYTES NFR BLD AUTO: 0.8 % (ref 0–0.5)
INR PPP: 3.2
LYMPHOCYTES # BLD AUTO: 1 K/UL (ref 1–4.8)
LYMPHOCYTES NFR BLD: 13.8 % (ref 18–48)
MAGNESIUM SERPL-MCNC: 2.2 MG/DL (ref 1.6–2.6)
MCH RBC QN AUTO: 26.3 PG (ref 27–31)
MCHC RBC AUTO-ENTMCNC: 31 G/DL (ref 32–36)
MCV RBC AUTO: 85 FL (ref 82–98)
MONOCYTES # BLD AUTO: 0.8 K/UL (ref 0.3–1)
MONOCYTES NFR BLD: 10.7 % (ref 4–15)
NEUTROPHILS # BLD AUTO: 5.5 K/UL (ref 1.8–7.7)
NEUTROPHILS NFR BLD: 73.6 % (ref 38–73)
NRBC BLD-RTO: 1 /100 WBC
PHOSPHATE SERPL-MCNC: 4.4 MG/DL (ref 2.7–4.5)
PLATELET # BLD AUTO: 170 K/UL (ref 150–350)
PMV BLD AUTO: 11.6 FL (ref 9.2–12.9)
POTASSIUM SERPL-SCNC: 4.1 MMOL/L (ref 3.5–5.1)
PROT SERPL-MCNC: 7.3 G/DL (ref 6–8.4)
PROTHROMBIN TIME: 31.3 SEC (ref 10.6–14.8)
RBC # BLD AUTO: 3.84 M/UL (ref 4–5.4)
SODIUM SERPL-SCNC: 132 MMOL/L (ref 136–145)
WBC # BLD AUTO: 7.4 K/UL (ref 3.9–12.7)

## 2020-03-16 PROCEDURE — 94761 N-INVAS EAR/PLS OXIMETRY MLT: CPT

## 2020-03-16 PROCEDURE — 85730 THROMBOPLASTIN TIME PARTIAL: CPT | Mod: 91

## 2020-03-16 PROCEDURE — 85610 PROTHROMBIN TIME: CPT

## 2020-03-16 PROCEDURE — 83735 ASSAY OF MAGNESIUM: CPT

## 2020-03-16 PROCEDURE — 84100 ASSAY OF PHOSPHORUS: CPT

## 2020-03-16 PROCEDURE — 27000221 HC OXYGEN, UP TO 24 HOURS

## 2020-03-16 PROCEDURE — 80053 COMPREHEN METABOLIC PANEL: CPT

## 2020-03-16 PROCEDURE — 85025 COMPLETE CBC W/AUTO DIFF WBC: CPT

## 2020-03-16 PROCEDURE — 90935 HEMODIALYSIS ONE EVALUATION: CPT

## 2020-03-16 PROCEDURE — 25000003 PHARM REV CODE 250: Performed by: INTERNAL MEDICINE

## 2020-03-16 PROCEDURE — 21400001 HC TELEMETRY ROOM

## 2020-03-16 PROCEDURE — 85730 THROMBOPLASTIN TIME PARTIAL: CPT

## 2020-03-16 PROCEDURE — 36415 COLL VENOUS BLD VENIPUNCTURE: CPT

## 2020-03-16 RX ORDER — HALOPERIDOL 5 MG/ML
2 INJECTION INTRAMUSCULAR ONCE
Status: DISCONTINUED | OUTPATIENT
Start: 2020-03-16 | End: 2020-03-19 | Stop reason: HOSPADM

## 2020-03-16 RX ADMIN — HEPARIN SODIUM 12 UNITS/KG/HR: 10000 INJECTION, SOLUTION INTRAVENOUS at 06:03

## 2020-03-16 RX ADMIN — METOPROLOL SUCCINATE 50 MG: 50 TABLET, FILM COATED, EXTENDED RELEASE ORAL at 09:03

## 2020-03-16 RX ADMIN — LOSARTAN POTASSIUM 25 MG: 25 TABLET, FILM COATED ORAL at 09:03

## 2020-03-16 RX ADMIN — HYDROCODONE BITARTRATE AND ACETAMINOPHEN 1 TABLET: 7.5; 325 TABLET ORAL at 12:03

## 2020-03-16 RX ADMIN — DILTIAZEM HYDROCHLORIDE 180 MG: 180 CAPSULE, COATED, EXTENDED RELEASE ORAL at 09:03

## 2020-03-16 RX ADMIN — HYDROCODONE BITARTRATE AND ACETAMINOPHEN 1 TABLET: 7.5; 325 TABLET ORAL at 10:03

## 2020-03-16 RX ADMIN — ASPIRIN 81 MG: 81 TABLET, DELAYED RELEASE ORAL at 09:03

## 2020-03-16 NOTE — HOSPITAL COURSE
"03/15:Angiography and possible endovascular intervention Tmrw  Pt still has pain on LLE    03/16:  Angiography and possible intervention was postponed today because of high INR  No other issues    I, Dr. Portillo, have assumed care of this patient on 3/17/20.  Patient admitted with left lower extremity pain, arterial ultrasound with absent posterior tibialis, dorsalis pedis and peritoneal in left lower extremity concerning for peripheral vascular disease.  Patient with a history of left atrial thrombus and atrial fibrillation on Coumadin, supratherapeutic on presentation. I confirmed home regimen prior to admission was 2.5mg daily. Coumadin was held and she was started on heparin infusion for anticoagulation.  Vascular surgery was consulted and angiogram +/- intervention was planned however INR remained elevated, discussed with Dr. Khoobehi (vascular surgery), would like INR< 2 (today INR 2.3) before proceeding with procedure, hopeful will occur tomorrow and patient is on scheduled for tomorrow.  States she has not been sleeping well Continues to report bilateral shooting leg pain.  Patient is ESRD on HD MWF.  Patient is frustrated about waiting for procedure.  Discussed with patient and family at bedside. On 3/18, INR 2.2, patient did undergo LLE angiogram by Dr Khoobehi, severe calcified long segment occlusion of posterior tibialis- no intervention was performed as well as small vessel disease not amenable to intervention. Initially plan was for FFP given right groin puncture site however crackles on examination and FFP deferred.  Heparin drip was discontinued.  Patient seen during dialysis with plans for 1 1/2 L fluid removal. On 3/19 seen with family present at beside, she states "I'm ready to go home." Continues with bilateral chronic LE pain, she states on oxycodone bid and tramadol as per pain management for this pain and diffuse calciphylaxis. Discussed coumadin dosing and monitoring. She has lenore link home " health and this will be resumed on discharge. Stable respiratory standpoint. Deemed stable for discharge though patient remains high risk due to multi morbidity. All questions and concerns addressed at bedside. Return precautions explained.     Discharge examination   Sitting side of bed, eating, NAD, cooperative  On supplemental O2 via NC, crackles at bases  RUE AVF with dressing pverlying  No significant LE edema  R groin angio site examined, dressing overlying, no signs of hematoma

## 2020-03-16 NOTE — PROGRESS NOTES
Total UF: 1500 mL  Net UF: 1000 mL       03/16/20 1350   Vital Signs   Temp 97.9 °F (36.6 °C)   Pulse 76   Heart Rate Source Monitor   Resp 16   Flow (L/min) 3   O2 Device (Oxygen Therapy) nasal cannula   BP (!) 149/71   BP Location Left arm   BP Method Automatic   Patient Position Lying   Post-Hemodialysis Assessment   Rinseback Volume (mL) 250 mL   Blood Volume Processed (Liters) 53.8 L   Dialyzer Clearance Lightly streaked   Duration of Treatment (minutes) 180 minutes   Hemodialysis Intake (mL) 500 mL   Total UF (mL) 1500 mL   Net Fluid Removal 1000   Patient Response to Treatment Cate well   Post-Treatment Weight 62.6 kg (138 lb 0.1 oz)   Treatment Weight Change -1   Arterial bleeding stop time (min) 5 min   Venous bleeding stop time (min) 5 min   Post-Hemodialysis Comments Tx complete. Pt stable.

## 2020-03-16 NOTE — NURSING
74 yr old female      03/16/20 1635        Wound 01/03/20 1723 Other (comment) medial Thigh #1   Date First Assessed/Time First Assessed: 01/03/20 1723   Pre-existing: Yes  Primary Wound Type: (c) Other (comment)  Side: Left  Orientation: (c) medial  Location: Thigh  Wound/PI Number (optional): #1   Wound Image    Dressing Appearance Intact   Drainage Amount Moderate   Drainage Characteristics/Odor Yellow;Tan   Appearance Red;Tan;Yellow;Slough;Moist   Periwound Area Intact;Dry   Wound Length (cm) 5.4 cm   Wound Width (cm) 2 cm   Wound Depth (cm) 0.8 cm   Wound Volume (cm^3) 8.64 cm^3   Wound Surface Area (cm^2) 10.8 cm^2   Care Cleansed with:;Sterile normal saline   Dressing Applied;Calcium alginate;Island/border     Daily dressing change lt inner thigh Clean with chlohrexidine/ns  pat dry  cover open wound with aquacel ag,and mepilex.

## 2020-03-16 NOTE — PROGRESS NOTES
Pt with elevated INR, will postpone the procedure pending normalization of INR.  Cont heparin gtt for now.

## 2020-03-16 NOTE — PROGRESS NOTES
Progress Note  [facility]    Admit Date: 3/14/2020   LOS: 1 day     SUBJECTIVE:     Follow-up For:  ESRD    On dialysis and complaining of leg pain bilaterally.  Also with left great toe pain.  Upset about dialyzing 3 days in a row.  Says that her shortness of breath has improved.  No chest pain.    Review of Systems:  GENERAL: NO fever, chills, night sweats, decreased intake  HEENT: NO blurry vision, glasses, floaters, hearing defects, sore throat  CV:  Swelling lower extremities.  NO CP, Palpitations, Arrhythmias  PULM:  SOB improved.  NO Cough, Hemoptysis, PND, Orthopnea  GI:  NO Nausea, Vomiting, Diarrhea, BRBPR, Melena, Abdominal Pain  :  NO Dysuria, Frequency, Urgency, Hematuria  NEURO: NO LOC, Seizure activity, Dizziness  SKIN:  Left thigh lesion improving.  NO Rash, Pruritis, Petechiae  MS:  Bone pain bilateral lower extremities.  NO Arthralgia, Muscle Aches, Arthritis      OBJECTIVE:     Vital Signs (Most Recent)  Temp: 98 °F (36.7 °C) (03/16/20 1030)  Pulse: 70 (03/16/20 1300)  Resp: 16 (03/16/20 1030)  BP: (!) 138/54 (03/16/20 1300)  SpO2: 97 % (03/16/20 0721)    Vital Signs Range (Last 24H):  Temp:  [97.5 °F (36.4 °C)-98.7 °F (37.1 °C)]   Pulse:  [70-91]   Resp:  [16-17]   BP: (109-166)/(54-80)   SpO2:  [95 %-99 %]     I/O last 3 completed shifts:  In: 600 [P.O.:600]  Out: -     Physical Exam:   General: Awake and alert. In distress because of leg pain.  HEENT: No scleral icterus, MM dry.   NECK:  Marked JVD. Supple,  No swelling, No masses.  CV: Regular rate and rhthym without murmurs, rubs, gallops.  PULM:  Much clear with a few rhonchi and no crackles or wheezes.  ABD: Soft, nl BS, NT, ND.  EXTR:  2+ edema.  No clubbing, cyanosis.   NEURO: Non focal and grossly intact.  SKIN: No rashes, lesions, ulcers.   MS: No joint effusions.   PSYCH: Normal Mood.    Laboratory:  Recent Labs   Lab 03/16/20  0455   HGB 10.1*   HCT 32.6*   WBC 7.40          Recent Labs   Lab  03/14/20  0548 03/15/20  0228 03/16/20  0455   * 134* 132*   K 4.6 3.7 4.1   CL 90* 91* 90*   CO2 31* 28 25   BUN 42* 28* 39*   CREATININE 4.3* 3.4* 4.3*   GLU 98 99 94   CALCIUM 8.5* 8.3* 8.1*       BNP  Recent Labs   Lab 03/14/20  0548   BNP >4,500*     ECHO  Reading physician: Gordon Eason MD Ordering physician: Keyur Bravo MD Study date: 1/3/20   Reason for Exam   Priority: Routine   Dx: SOB (shortness of breath) [R06.02 (ICD-10-CM)]   Comments:    Conclusion     · Eccentric left ventricular hypertrophy.  · Severely decreased left ventricular systolic function. The estimated ejection fraction is 25%.  · Grade IV (severe) left ventricular diastolic dysfunction.  · Moderate right ventricular enlargement.  · Moderately reduced right ventricular systolic function.  · Severe left atrial enlargement.  · Layered left atrial thrombus suggested. The thrombus is fixed and located in the inferior cavity.  · Moderate right atrial enlargement.  · There is a bioprosthetic mitral valve. Prosthetic mitral valve is normal.  · Moderate tricuspid regurgitation.  · Mild to moderate pulmonary hypertension present. Estimated PA systolic pressure 50 mm of Hg.  · Mild to moderate pulmonic regurgitation.  · Small posterior pericardial effusion.       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       Nephrology Assessment/Plan:     1. Pulmonary  edema  Improved with UF/HD  Monitor     2. ESRD  HD today and MWF     3. HTN  Better with UF  Monitor     4. S/P MVR  On coumadin     5. Anemia  At ESRD goal  DONNY with HD  Monitor     6. Hypo Na+  Stable  Follow-up with UF     7. Systolic and diastolic HF  Frequent episodes of pulmonary edema  Is she a candidate for Entresto?    8. Left great toe pain  Check uric acid    9. Secondary HPT  Calcium corrects for hypoalbuminemia  Follow-up phosphorus    Long discussion with the patient regarding compliance with her dialysis regimen.  I have told her that she needs to stop signing off early so she does not end up in the hospital.  40 min visit.    Joseph Ingram M.D.

## 2020-03-16 NOTE — PROGRESS NOTES
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  Patient recently had episodes of Shortness of breath and was dialyzed back to back for certain days  Denies any other issues    Overview/Hospital Course:  03/15:Angiography and possible endovascular intervention Tmrw  Pt still has pain on LLE    Interval History:    Review of Systems   Constitutional: Negative for activity change and appetite change.   HENT: Negative for congestion and dental problem.    Eyes: Negative for discharge and itching.   Respiratory: Negative for shortness of breath.    Cardiovascular: Negative for chest pain.   Gastrointestinal: Negative for abdominal distention and abdominal pain.   Endocrine: Negative for cold intolerance.   Genitourinary: Negative for difficulty urinating and dysuria.   Musculoskeletal: Negative for arthralgias and back pain.   Skin: Negative for color change.   Neurological: Negative for dizziness and facial asymmetry.   Hematological: Negative for adenopathy.   Psychiatric/Behavioral: Negative for agitation and behavioral problems.     Objective:     Vital Signs (Most Recent):  Temp: 98.7 °F (37.1 °C) (03/15/20 1915)  Pulse: 75 (03/15/20 1915)  Resp: 16 (03/15/20 1915)  BP: 136/72 (03/15/20 1915)  SpO2: 95 % (03/15/20 1915) Vital Signs (24h Range):  Temp:  [97.6 °F (36.4 °C)-98.7 °F (37.1 °C)] 98.7 °F (37.1 °C)  Pulse:  [] 75  Resp:  [16-20] 16  SpO2:  [95 %-100 %] 95 %  BP:  (109-136)/(68-86) 136/72     Weight: 59.9 kg (132 lb 1.6 oz)  Body mass index is 21.98 kg/m².    Intake/Output Summary (Last 24 hours) at 3/15/2020 2156  Last data filed at 3/15/2020 1600  Gross per 24 hour   Intake 600 ml   Output --   Net 600 ml      Physical Exam   Constitutional: She is oriented to person, place, and time. No distress.   Eyes: Pupils are equal, round, and reactive to light. EOM are normal.   Neck: Neck supple.   Cardiovascular: Normal rate.   Pulmonary/Chest: Effort normal and breath sounds normal.   Abdominal: Soft. Bowel sounds are normal.   Musculoskeletal: Normal range of motion. She exhibits no edema.   Neurological: She is alert and oriented to person, place, and time.   Skin: Skin is warm.   Psychiatric: She has a normal mood and affect.   Nursing note and vitals reviewed.      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 INR already High  Per Vascular surgeon angiography and possible endovascular intervention Tmrw      Left atrial thrombus  On Coumadin at home      HLD (hyperlipidemia)  Continue statins      Chronic respiratory failure  On 3 L o2 at Home      Other persistent atrial fibrillation  Persistent A Fib  Continue usual medications      Calciphylaxis  Chronic ongoing issue in the background of end-stage renal disease  Pain medications p.r.n. basis    ESRD (end stage renal disease) on HD  M,W, F  HD Per Nephkarmen MENDOZA      Tobacco dependence  Tobacco cessation counselling done      HTN (hypertension)  Continue Present medications        VTE Risk Mitigation (From admission, onward)         Ordered     heparin 25,000 units in dextrose 5% 250 mL (100 units/mL) infusion HIGH INTENSITY nomogram - [facility]  Continuous     Question:  Heparin Infusion Adjustment (DO NOT MODIFY ANSWER)  Answer:  [email or web address]\epic\Images\Pharmacy\HeparinInfusions\heparin HIGH INTENSITY nomogram for [id number]    03/14/20 1157     heparin 25,000 units in dextrose 5% (100 units/ml) IV bolus from bag - ADDITIONAL PRN BOLUS - 60 units/kg  As needed (PRN)     Question:  Heparin Infusion Adjustment (DO NOT MODIFY ANSWER)  Answer:  [email or web address]\epic\Images\Pharmacy\HeparinInfusions\heparin HIGH INTENSITY nomogram for [facility] [id number]    03/14/20 1157     heparin 25,000 units in dextrose 5% (100 units/ml) IV bolus from bag - ADDITIONAL PRN BOLUS - 30 units/kg  As needed (PRN)     Question:  Heparin Infusion Adjustment (DO NOT MODIFY ANSWER)  Answer:  [email or web address]\[email or web address]\Images\Pharmacy\HeparinInfusions\heparin HIGH INTENSITY nomogram for [facility] [id number]    03/14/20 1157     IP VTE HIGH RISK PATIENT  Once      03/14/20 1157                      Franky Maria MD  Department of Hospital Medicine   [facility]

## 2020-03-16 NOTE — ASSESSMENT & PLAN NOTE
Patient admitted with severe PVD of LLE  No Doppler flow identified within posterior tibial, peroneal, or dorsalis pedis arteries consistent with occlusion per [facility] report  pt on heparin infusion and Follow [facility] protocol . INR already High  Per Vascular surgeon angiography and possible endovascular intervention Tmrw

## 2020-03-16 NOTE — PROGRESS NOTES
Formerly [facility], later [facility]  Adult Nutrition   Progress Note (Initial Assessment)     SUMMARY     Recommendations  Recommendation/Intervention: 1. Added Benecalorie Milkshake BID (to provide 1460 kcal/day and 38 g/day protein). 2. Encourage PO intake of meals and supplements. 3. RD ordered phosphorus lab, may need to modify supplement if elevated.   Goals: 1. Patient to meet at least 75% of estimated energy and protein needs via PO intake of meals and supplements. 2. Phosphorus labs to be reviewed.   Nutrition Goal Status: new  Communication of RD Recs: reviewed with RN    Dietitian Rounds Brief  · Patient assessed 2' MST score 3. Patient in dialysis at time of rounds and was NPO past midnight. Diet has now been advanced to renal per RN.  PO intake poor. RD encouraged RN to promote intake once patient returns from dialysis. RD added milkshake with benecalorie. Patient dislikes Nepro and has taken milkshake during past admissions.   · Patient is a picky eater and know to be non-compliant on renal diet. Family usually brings patient food, but this may not be possible during this admission due to limitation of visitors and safety precautions in relation to COVID-19 procedures.     Reason for Assessment  Reason For Assessment: identified at risk by screening criteria  Relevant Medical History: CHF, HTN, ESRD on HD, Calciphylaxis, HLD     Nutrition Risk Screen  Nutrition Risk Screen: other (see comments)(MST 3)       Wound 01/03/20 1723 Other (comment) medial Thigh #1-Wound Image: Images linked  MST Score: 3  Have you recently lost weight without trying?: Unsure  Weight loss score: 2  Have you been eating poorly because of a decreased appetite?: Yes  Appetite score: 1       Nutrition/Diet History  Spiritual, Cultural Beliefs, Jehovah's witness Practices, Values that Affect Care: no  Food Allergies: NKFA  Factors Affecting Nutritional Intake: decreased appetite    Anthropometrics  Temp: 97.5 °F (36.4 °C)  Height Method:  "Stated  Height: 5' 5" (165.1 cm)  Height (inches): 65 in  Weight Method: Bed Scale  Weight: 59.9 kg (132 lb 1.6 oz)  Weight (lb): 132.1 lb  Ideal Body Weight (IBW), Female: 125 lb  % Ideal Body Weight, Female (lb): 117.28 %  BMI (Calculated): 22  BMI Grade: 18.5-24.9 - normal       Weight History:  Wt Readings from Last 10 Encounters:   03/15/20 59.9 kg (132 lb 1.6 oz)   02/19/20 (P) 64 kg (141 lb)   02/05/20 60.8 kg (134 lb 0.6 oz)   01/08/20 53.4 kg (117 lb 10.2 oz)   12/21/19 59.4 kg (130 lb 14.4 oz)   10/25/19 58.3 kg (128 lb 8.5 oz)   10/22/19 55 kg (121 lb 4.1 oz)   10/03/19 58.1 kg (128 lb)   09/30/19 61 kg (134 lb 7.7 oz)   09/07/19 58 kg (127 lb 13.9 oz)     Lab/Procedures/Meds: Pertinent Labs Reviewed  Clinical Chemistry:  Recent Labs   Lab 03/16/20  0455 03/16/20  0938   *  --    K 4.1  --    CL 90*  --    CO2 25  --    GLU 94  --    BUN 39*  --    CREATININE 4.3*  --    CALCIUM 8.1*  --    PROT 7.3  --    ALBUMIN 2.7*  --    BILITOT 1.1*  --    ALKPHOS 163*  --    AST 47*  --    ALT 39  --    ANIONGAP 17*  --    ESTGFRAFRICA 11.1*  --    EGFRNONAA 9.6*  --    MG 2.2  --    PHOS  --  4.4     CBC:   Recent Labs   Lab 03/16/20  0455   WBC 7.40   RBC 3.84*   HGB 10.1*   HCT 32.6*      MCV 85   MCH 26.3*   MCHC 31.0*     Cardiac Profile:  Recent Labs   Lab 03/14/20  0548   BNP >4,500*   TROPONINI 0.056*     Medications: Pertinent Medications reviewed  Scheduled Meds:   aspirin  81 mg Oral Daily    diltiaZEM  180 mg Oral Daily    losartan  25 mg Oral Daily    metoprolol succinate  50 mg Oral Daily     Continuous Infusions:   heparin (porcine) in D5W 14 Units/kg/hr (03/15/20 1056)     PRN Meds:.heparin (PORCINE), heparin (PORCINE), HYDROcodone-acetaminophen, HYDROmorphone, ondansetron    Estimated/Assessed Needs  Weight Used For Calorie Calculations: 59.9 kg (132 lb 0.9 oz)  Energy Calorie Requirements (kcal): 1498 - 1797 (25 - 30)   Energy Need Method: Kcal/kg  Protein Requirements: 72 - 90 " (1.2 - 1.5)   Weight Used For Protein Calculations: 59.9 kg (132 lb 0.9 oz)  Fluid Requirements (mL): UOP + 1000 ml/day   Estimated Fluid Requirement Method: other (see comments)  RDA Method (mL): 1498       Nutrition Prescription Ordered  Current Diet Order: Renal     Evaluation of Received Nutrient/Fluid Intake  Energy Calories Required: not meeting needs  Protein Required: not meeting needs  Fluid Required: meeting needs     Intake/Output Summary (Last 24 hours) at 3/16/2020 1856  Last data filed at 3/16/2020 1542  Gross per 24 hour   Intake 740 ml   Output 1500 ml   Net -760 ml      Nutrition Risk  Level of Risk/Frequency of Follow-up: moderate - high     Monitor and Evaluation  Food and Nutrient Intake: food and beverage intake, energy intake  Food and Nutrient Adminstration: diet order  Physical Activity and Function: nutrition-related ADLs and IADLs, factors affecting access to physical activity  Anthropometric Measurements: weight, weight change, body mass index  Biochemical Data, Medical Tests and Procedures: electrolyte and renal panel, glucose/endocrine profile, lipid profile, gastrointestinal profile, inflammatory profile  Nutrition-Focused Physical Findings: overall appearance     Nutrition Follow-Up  RD Follow-up?: Yes     Bel Leo RD 03/16/2020 6:57 PM

## 2020-03-16 NOTE — SUBJECTIVE & OBJECTIVE
Interval History:    Review of Systems   Constitutional: Negative for activity change and appetite change.   HENT: Negative for congestion and dental problem.    Eyes: Negative for discharge and itching.   Respiratory: Negative for shortness of breath.    Cardiovascular: Negative for chest pain.   Gastrointestinal: Negative for abdominal distention and abdominal pain.   Endocrine: Negative for cold intolerance.   Genitourinary: Negative for difficulty urinating and dysuria.   Musculoskeletal: Negative for arthralgias and back pain.   Skin: Negative for color change.   Neurological: Negative for dizziness and facial asymmetry.   Hematological: Negative for adenopathy.   Psychiatric/Behavioral: Negative for agitation and behavioral problems.     Objective:     Vital Signs (Most Recent):  Temp: 98.7 °F (37.1 °C) (03/15/20 1915)  Pulse: 75 (03/15/20 1915)  Resp: 16 (03/15/20 1915)  BP: 136/72 (03/15/20 1915)  SpO2: 95 % (03/15/20 1915) Vital Signs (24h Range):  Temp:  [97.6 °F (36.4 °C)-98.7 °F (37.1 °C)] 98.7 °F (37.1 °C)  Pulse:  [] 75  Resp:  [16-20] 16  SpO2:  [95 %-100 %] 95 %  BP: (109-136)/(68-86) 136/72     Weight: 59.9 kg (132 lb 1.6 oz)  Body mass index is 21.98 kg/m².    Intake/Output Summary (Last 24 hours) at 3/15/2020 2156  Last data filed at 3/15/2020 1600  Gross per 24 hour   Intake 600 ml   Output --   Net 600 ml      Physical Exam   Constitutional: She is oriented to person, place, and time. No distress.   Eyes: Pupils are equal, round, and reactive to light. EOM are normal.   Neck: Neck supple.   Cardiovascular: Normal rate.   Pulmonary/Chest: Effort normal and breath sounds normal.   Abdominal: Soft. Bowel sounds are normal.   Musculoskeletal: Normal range of motion. She exhibits no edema.   Neurological: She is alert and oriented to person, place, and time.   Skin: Skin is warm.   Psychiatric: She has a normal mood and affect.   Nursing note and vitals reviewed.      Significant Labs:   BMP:    Recent Labs   Lab 03/15/20  0228   GLU 99   *   K 3.7   CL 91*   CO2 28   BUN 28*   CREATININE 3.4*   CALCIUM 8.3*   MG 2.1     CBC:   Recent Labs   Lab 03/14/20  0548 03/14/20  1217 03/15/20  0228   WBC 9.52 9.52 8.52  8.52   HGB 10.5* 9.0* 9.7*  9.7*   HCT 33.5* 29.0* 32.7*  32.7*    172 170  170

## 2020-03-16 NOTE — PROGRESS NOTES
No acute overnight issues      Vital Signs    Report   View Graph    03/14 0700  03/15 0659 03/15 0700  03/15 2136  Most Recent     Temp (°F) 97.4-97.9 97.6-98.7  98.7 (37.1)  03/15 1915   Pulse 60-97   75  03/15 1915   Resp 14-21  16-18  16  03/15 1915   BP 98//70 109//72  136/72  03/15 1915   MAP (mmHg) 75-96         SpO2 (%) 93 -100   95  03/15 1915   Weight (kg) 59.9-66.5         Intake/Output    Report   View Table   Net  +600mL  (600 mL / 59.9 kg = 10 mL/kg)  3/15 0700 - 3/16 0659  38909/3961415/15  2,500  500  600  -2,000  +600  500  (8.3)  600  (10)  2,500  P.O.  Other  In  Out  Other  Net  Report   Scheduled     Medication Last Action   aspirin EC tablet 81 mg Given   81 mg, Daily, Oral    03/15 0932   diltiaZEM 24 hr capsule 180 mg Given   180 mg, Daily, Oral    03/15 0932   losartan tablet 25 mg Ordered   25 mg, Daily, Oral       metoprolol succinate (TOPROL-XL) 24 hr tablet 50 mg Given   50 mg, Daily, Oral    03/15 0932      Continuous     Medication Last Action   heparin 25,000 units in dextrose 5% 250 mL (100 units/mL) infusion HIGH INTENSITY nomogram - [facility] Rate/Dose Change   14 Units/kg/hr, Continuous, IV    03/14 2219      PRN     Medication Last Action   heparin 25,000 units in dextrose 5% (100 units/ml) IV bolus from bag - ADDITIONAL PRN BOLUS - 30 units/kg Ordered   30 Units/kg, PRN, IV       heparin 25,000 units in dextrose 5% (100 units/ml) IV bolus from bag - ADDITIONAL PRN BOLUS - 60 units/kg Ordered   60 Units/kg, PRN, IV       HYDROcodone-acetaminophen 7.5-325 mg per tablet 1 tablet Given   1 tablet, Q4H PRN, Oral    03/15 0021   HYDROmorphone injection 0.5 mg Given   0.5 mg, Q6H PRN, IV    03/15 0111   ondansetron injection 4 mg Ordered        Selected Labs    Report   (Up to last 2 results from the past 72 hours)   Switch View    03/14 0548 03/14 1217 03/15 0228   Sodium 135Low      --     134Low        Potassium 4.6     --     3.7       Chloride 90Low      --      91Low        CO2 31High      --     28       BUN, Bld 42High      --     28High        Creatinine 4.3High      --     3.4High        Glucose 98     --     99       Magnesium 2.2     --     2.1       WBC --     --     8.52        --     --     8.52       Hemoglobin --     --     9.7Low         --     --     9.7Low        Hematocrit --     --     32.7Low         --     --     32.7Low        Platelets --     --     170        --     --     170          A/P:       Pulmonary edema, decompensated CHF  Better clinically  F/u AM     ESRD  Re assess for HD/ UF in AM  Otherwise, will maintain MWF regimen     HTN  Better with UF  Monitor     S/p MVR  On coumadin     Anemia  DONNY with HD     Hypo Na+  Monitor with HD

## 2020-03-17 PROBLEM — R52 PAIN: Chronic | Status: ACTIVE | Noted: 2020-03-17

## 2020-03-17 PROBLEM — Z95.2 H/O MITRAL VALVE REPLACEMENT: Chronic | Status: ACTIVE | Noted: 2020-03-17

## 2020-03-17 PROBLEM — M10.9 GOUT: Chronic | Status: ACTIVE | Noted: 2020-03-17

## 2020-03-17 PROBLEM — I50.42 CHRONIC COMBINED SYSTOLIC AND DIASTOLIC HEART FAILURE: Chronic | Status: ACTIVE | Noted: 2020-03-17

## 2020-03-17 PROBLEM — E83.59 CALCIPHYLAXIS: Chronic | Status: RESOLVED | Noted: 2017-07-18 | Resolved: 2020-03-17

## 2020-03-17 PROBLEM — D64.9 ANEMIA: Chronic | Status: ACTIVE | Noted: 2020-03-17

## 2020-03-17 LAB
ALBUMIN SERPL BCP-MCNC: 2.6 G/DL (ref 3.5–5.2)
ALP SERPL-CCNC: 135 U/L (ref 55–135)
ALT SERPL W/O P-5'-P-CCNC: 27 U/L (ref 10–44)
ANION GAP SERPL CALC-SCNC: 15 MMOL/L (ref 8–16)
APTT PPP: 55.4 SEC (ref 23.6–33.3)
APTT PPP: 63.2 SEC (ref 23.6–33.3)
APTT PPP: 89.8 SEC (ref 23.6–33.3)
APTT PPP: >200 SEC (ref 23.6–33.3)
AST SERPL-CCNC: 26 U/L (ref 10–40)
BASOPHILS # BLD AUTO: 0.02 K/UL (ref 0–0.2)
BASOPHILS NFR BLD: 0.3 % (ref 0–1.9)
BILIRUB SERPL-MCNC: 0.9 MG/DL (ref 0.1–1)
BNP SERPL-MCNC: >4500 PG/ML (ref 0–99)
BUN SERPL-MCNC: 26 MG/DL (ref 8–23)
CALCIUM SERPL-MCNC: 8.4 MG/DL (ref 8.7–10.5)
CHLORIDE SERPL-SCNC: 96 MMOL/L (ref 95–110)
CO2 SERPL-SCNC: 24 MMOL/L (ref 23–29)
CREAT SERPL-MCNC: 3.6 MG/DL (ref 0.5–1.4)
DIFFERENTIAL METHOD: ABNORMAL
EOSINOPHIL # BLD AUTO: 0.1 K/UL (ref 0–0.5)
EOSINOPHIL NFR BLD: 0.8 % (ref 0–8)
ERYTHROCYTE [DISTWIDTH] IN BLOOD BY AUTOMATED COUNT: 17.6 % (ref 11.5–14.5)
ERYTHROCYTE [SEDIMENTATION RATE] IN BLOOD BY WESTERGREN METHOD: 36 MM/HR (ref 0–20)
EST. GFR  (AFRICAN AMERICAN): 13.7 ML/MIN/1.73 M^2
EST. GFR  (NON AFRICAN AMERICAN): 11.9 ML/MIN/1.73 M^2
GLUCOSE SERPL-MCNC: 87 MG/DL (ref 70–110)
HAV IGM SERPL QL IA: NEGATIVE
HBV CORE IGM SERPL QL IA: NEGATIVE
HBV SURFACE AB SER QL: NON REACTIVE
HBV SURFACE AG SERPL QL IA: NEGATIVE
HBV SURFACE AG SERPL QL IA: NEGATIVE
HCT VFR BLD AUTO: 31.3 % (ref 37–48.5)
HCV AB S/CO SERPL IA: <0.1 S/CO RATIO (ref 0–0.9)
HGB BLD-MCNC: 9.7 G/DL (ref 12–16)
IMM GRANULOCYTES # BLD AUTO: 0.09 K/UL (ref 0–0.04)
IMM GRANULOCYTES NFR BLD AUTO: 1.2 % (ref 0–0.5)
INR PPP: 2.3
LYMPHOCYTES # BLD AUTO: 0.7 K/UL (ref 1–4.8)
LYMPHOCYTES NFR BLD: 8.8 % (ref 18–48)
MAGNESIUM SERPL-MCNC: 2 MG/DL (ref 1.6–2.6)
MCH RBC QN AUTO: 26.4 PG (ref 27–31)
MCHC RBC AUTO-ENTMCNC: 31 G/DL (ref 32–36)
MCV RBC AUTO: 85 FL (ref 82–98)
MONOCYTES # BLD AUTO: 0.5 K/UL (ref 0.3–1)
MONOCYTES NFR BLD: 6.7 % (ref 4–15)
NEUTROPHILS # BLD AUTO: 6.4 K/UL (ref 1.8–7.7)
NEUTROPHILS NFR BLD: 82.2 % (ref 38–73)
NRBC BLD-RTO: 0 /100 WBC
PHOSPHATE SERPL-MCNC: 3.2 MG/DL (ref 2.7–4.5)
PLATELET # BLD AUTO: 192 K/UL (ref 150–350)
PMV BLD AUTO: 11.9 FL (ref 9.2–12.9)
POTASSIUM SERPL-SCNC: 4 MMOL/L (ref 3.5–5.1)
PROT SERPL-MCNC: 7.2 G/DL (ref 6–8.4)
PROTHROMBIN TIME: 24.3 SEC (ref 10.6–14.8)
RBC # BLD AUTO: 3.68 M/UL (ref 4–5.4)
SODIUM SERPL-SCNC: 135 MMOL/L (ref 136–145)
URATE SERPL-MCNC: 4 MG/DL (ref 2.4–5.7)
WBC # BLD AUTO: 7.72 K/UL (ref 3.9–12.7)

## 2020-03-17 PROCEDURE — 85730 THROMBOPLASTIN TIME PARTIAL: CPT | Mod: 91

## 2020-03-17 PROCEDURE — 84100 ASSAY OF PHOSPHORUS: CPT

## 2020-03-17 PROCEDURE — 21400001 HC TELEMETRY ROOM

## 2020-03-17 PROCEDURE — 83880 ASSAY OF NATRIURETIC PEPTIDE: CPT

## 2020-03-17 PROCEDURE — 94761 N-INVAS EAR/PLS OXIMETRY MLT: CPT

## 2020-03-17 PROCEDURE — 25000003 PHARM REV CODE 250: Performed by: INTERNAL MEDICINE

## 2020-03-17 PROCEDURE — 85730 THROMBOPLASTIN TIME PARTIAL: CPT

## 2020-03-17 PROCEDURE — 80053 COMPREHEN METABOLIC PANEL: CPT

## 2020-03-17 PROCEDURE — 27000221 HC OXYGEN, UP TO 24 HOURS

## 2020-03-17 PROCEDURE — 85610 PROTHROMBIN TIME: CPT

## 2020-03-17 PROCEDURE — 83735 ASSAY OF MAGNESIUM: CPT

## 2020-03-17 PROCEDURE — 85025 COMPLETE CBC W/AUTO DIFF WBC: CPT

## 2020-03-17 PROCEDURE — 84550 ASSAY OF BLOOD/URIC ACID: CPT

## 2020-03-17 PROCEDURE — 36415 COLL VENOUS BLD VENIPUNCTURE: CPT

## 2020-03-17 PROCEDURE — 85651 RBC SED RATE NONAUTOMATED: CPT

## 2020-03-17 RX ORDER — ISOSORBIDE MONONITRATE 30 MG/1
30 TABLET, EXTENDED RELEASE ORAL DAILY
Status: DISCONTINUED | OUTPATIENT
Start: 2020-03-18 | End: 2020-03-19 | Stop reason: HOSPADM

## 2020-03-17 RX ORDER — TALC
3 POWDER (GRAM) TOPICAL NIGHTLY PRN
Status: DISCONTINUED | OUTPATIENT
Start: 2020-03-17 | End: 2020-03-19 | Stop reason: HOSPADM

## 2020-03-17 RX ORDER — ACETAMINOPHEN 325 MG/1
650 TABLET ORAL EVERY 6 HOURS PRN
Status: DISCONTINUED | OUTPATIENT
Start: 2020-03-17 | End: 2020-03-19 | Stop reason: HOSPADM

## 2020-03-17 RX ADMIN — LOSARTAN POTASSIUM 25 MG: 25 TABLET, FILM COATED ORAL at 08:03

## 2020-03-17 RX ADMIN — HYDROCODONE BITARTRATE AND ACETAMINOPHEN 1 TABLET: 7.5; 325 TABLET ORAL at 11:03

## 2020-03-17 RX ADMIN — DILTIAZEM HYDROCHLORIDE 180 MG: 180 CAPSULE, COATED, EXTENDED RELEASE ORAL at 08:03

## 2020-03-17 RX ADMIN — HYDROCODONE BITARTRATE AND ACETAMINOPHEN 1 TABLET: 7.5; 325 TABLET ORAL at 03:03

## 2020-03-17 RX ADMIN — HEPARIN SODIUM 15 UNITS/KG/HR: 10000 INJECTION, SOLUTION INTRAVENOUS at 02:03

## 2020-03-17 RX ADMIN — ASPIRIN 81 MG: 81 TABLET, DELAYED RELEASE ORAL at 08:03

## 2020-03-17 RX ADMIN — METOPROLOL SUCCINATE 50 MG: 50 TABLET, FILM COATED, EXTENDED RELEASE ORAL at 08:03

## 2020-03-17 NOTE — ASSESSMENT & PLAN NOTE
Patient admitted with severe PVD of LLE  No Doppler flow identified within posterior tibial, peroneal, or dorsalis pedis arteries consistent with occlusion per [facility] LLE report  Patient was previously on Coumadin for cardiac thrombus as well as atrial fibrillation.  Coumadin currently on hold an on heparin drip as per infusion portable however INR still 2.3 today  Continue neurovascular checks  Renal diet today and NPO midnight  Continue to hold Coumadin and continue heparin drip  Appreciate vascular surgery input, tentatively planning angiogram tomorrow once INR <2

## 2020-03-17 NOTE — ASSESSMENT & PLAN NOTE
ESRD on dialysis MWF via right upper extremity AVF.  Patient has tendency for getting pulmonary edema and volume overload.  Respiratory exam does have rhonchorous breath sounds however denies any respiratory complaints.  BNP > 4500.  Renally dose all medications and avoid nephrotoxin drugs  Dr. Ingram following for Nephrology, dialysis as per schedule

## 2020-03-17 NOTE — SUBJECTIVE & OBJECTIVE
Interval History:  Patient continues to report severe bilateral shooting sharp leg pains.  States she did not sleep since admitted to the hospital.  She is frustrated due to continued postponement of her angiogram.  Family member present at bedside.  Discussed with vascular surgery over the phone, planning angiogram once INR <2, hopeful for tomorrow.  Patient denies any bleeding currently on heparin drip.  Previous echocardiogram with EF of 25%, grade 4 diastolic dysfunction.  INR today 2.3.      Review of Systems   Constitutional: Negative for chills and fever.   HENT: Negative for congestion, sinus pressure and sinus pain.    Respiratory: Negative for shortness of breath.    Cardiovascular: Negative for chest pain, palpitations and leg swelling.   Gastrointestinal: Negative for constipation, nausea and vomiting.   Musculoskeletal:        Bilateral leg pain   Skin: Positive for wound (Due to calciphylaxis, present left inner thigh).   Neurological: Negative for seizures, syncope and speech difficulty.   Hematological: Does not bruise/bleed easily.   Psychiatric/Behavioral: Positive for dysphoric mood and sleep disturbance.     Objective:     Vital Signs (Most Recent):  Temp: 97.4 °F (36.3 °C) (03/17/20 1113)  Pulse: 76 (03/17/20 1545)  Resp: 18 (03/17/20 1545)  BP: 126/74 (03/17/20 1545)  SpO2: 100 % (03/17/20 1545) Vital Signs (24h Range):  Temp:  [97.4 °F (36.3 °C)-98.5 °F (36.9 °C)] 97.4 °F (36.3 °C)  Pulse:  [64-85] 76  Resp:  [16-18] 18  SpO2:  [98 %-100 %] 100 %  BP: (126-165)/(74-99) 126/74     Weight: 59.9 kg (132 lb 1.6 oz)  Body mass index is 21.98 kg/m².    Intake/Output Summary (Last 24 hours) at 3/17/2020 1610  Last data filed at 3/17/2020 0000  Gross per 24 hour   Intake 240 ml   Output --   Net 240 ml      Physical Exam   Constitutional: She is oriented to person, place, and time. No distress.    female lying in bed, on mobile phone   HENT:   Head: Normocephalic and atraumatic.    Eyes: Right eye exhibits no discharge. Left eye exhibits no discharge.   Neck: Neck supple.   Cardiovascular: Normal rate and regular rhythm.   Pulmonary/Chest: No stridor. No respiratory distress.   Rhonchorous breath sounds, no wheeze appreciated, on supplemental oxygen   Abdominal: Soft. Bowel sounds are normal. She exhibits no distension. There is no tenderness.   Neurological: She is alert and oriented to person, place, and time.   Speech intact.  Moving all 4 extremities.   Skin: Capillary refill takes more than 3 seconds. She is not diaphoretic.   Wound with overlying Mepilex left inner thigh, right upper extremity AVF with overlying dressing   Psychiatric:   Intermittently irritable at times   Vitals reviewed.      Significant Labs:   BMP:   Recent Labs   Lab 03/17/20  1209   GLU 87   *   K 4.0   CL 96   CO2 24   BUN 26*   CREATININE 3.6*   CALCIUM 8.4*   MG 2.0     CBC:   Recent Labs   Lab 03/16/20  0455 03/17/20  1209   WBC 7.40 7.72   HGB 10.1* 9.7*   HCT 32.6* 31.3*    192     CMP:   Recent Labs   Lab 03/16/20  0455 03/17/20  1209   * 135*   K 4.1 4.0   CL 90* 96   CO2 25 24   GLU 94 87   BUN 39* 26*   CREATININE 4.3* 3.6*   CALCIUM 8.1* 8.4*   PROT 7.3 7.2   ALBUMIN 2.7* 2.6*   BILITOT 1.1* 0.9   ALKPHOS 163* 135   AST 47* 26   ALT 39 27   ANIONGAP 17* 15   EGFRNONAA 9.6* 11.9*     Coagulation:   Recent Labs   Lab 03/17/20  0504 03/17/20  1210   INR 2.3  --    APTT 89.8* 63.2*     Lactic Acid: No results for input(s): LACTATE in the last 48 hours.  Magnesium:   Recent Labs   Lab 03/16/20  0455 03/17/20  1209   MG 2.2 2.0     POCT Glucose: No results for input(s): POCTGLUCOSE in the last 48 hours.  Troponin: No results for input(s): TROPONINI in the last 48 hours.  TSH:   Recent Labs   Lab 09/29/19  1049   TSH 3.155     Urine Culture: No results for input(s): LABURIN in the last 48 hours.  Urine Studies: No results for input(s): COLORU, APPEARANCEUA, PHUR, SPECGRAV, PROTEINUA,  GLUCUA, KETONESU, BILIRUBINUA, OCCULTUA, NITRITE, UROBILINOGEN, LEUKOCYTESUR, RBCUA, WBCUA, BACTERIA, SQUAMEPITHEL, HYALINECASTS in the last 48 hours.    Invalid input(s): BRITTANY  All pertinent labs within the past 24 hours have been reviewed.    Significant Imaging: I have reviewed all pertinent imaging results/findings within the past 24 hours.     X-ray Femur Ap/lat Left    Result Date: 3/14/2020  EXAMINATION: XR FEMUR 2 VIEW LEFT CLINICAL HISTORY: Pain, unspecified COMPARISON: None available FINDINGS: No acute fracture or malalignment of the left femur.  Osteopenia.  Vascular calcifications noted.  Mild osteoarthrosis of the left hip.     No acute osseous abnormality. Electronically signed by: Harish Liu MD Date:    03/14/2020 Time:    07:36    X-ray Tibia Fibula 2 View Left    Result Date: 3/14/2020  EXAMINATION: XR TIBIA FIBULA 2 VIEW LEFT CLINICAL HISTORY: Unspecified fall, initial encounter COMPARISON: None available FINDINGS: No acute fracture or malalignment of the tibia or fibula.  Osteopenia.  Femoral tibial chondrocalcinosis.  Vascular calcifications noted.     No acute osseous abnormality. Electronically signed by: Harish Liu MD Date:    03/14/2020 Time:    07:36    X-ray Foot Complete Left    Result Date: 3/14/2020  EXAMINATION: XR FOOT COMPLETE 3 VIEW LEFT CLINICAL HISTORY: Unspecified fall, initial encounter COMPARISON: None available FINDINGS: No acute fracture or malalignment of the left foot.  Joint spaces are maintained.  Vascular calcifications noted.  Enthesopathy of the calcaneus.     No acute osseous abnormality. Electronically signed by: Harish Liu MD Date:    03/14/2020 Time:    07:38    Us Lower Extremity Arteries Left    Result Date: 3/14/2020  EXAMINATION: US LOWER EXTREMITY ARTERIES LEFT CLINICAL HISTORY: Pain in leg, unspecified TECHNIQUE: Grayscale, color Doppler, and spectral Doppler evaluation of left lower extremity arterial system was performed. COMPARISON: None  FINDINGS: No arterial flow identified within the posterior tibial, peroneal, and dorsalis pedis arteries. Common femoral artery, profunda femoris artery, superficial femoral artery, popliteal artery, and anterior tibial artery are patent.  No markedly elevated peak systolic velocities, although scattered atherosclerotic plaque is evident.  Tardus parvus waveform noted within the left anterior tibial artery.     No Doppler flow identified within posterior tibial, peroneal, or dorsalis pedis arteries consistent with occlusion. ANETTE demonstrates parvus tardus waveform, consistent with significant upstream stenosis. Doppler findings of peripheral vascular disease. Electronically signed by: Harish Liu MD Date:    03/14/2020 Time:    10:48    X-ray Chest Ap Portable    Result Date: 3/14/2020  EXAMINATION: XR CHEST AP PORTABLE CLINICAL HISTORY: Shortness of Breath; COMPARISON: 02/03/2020 FINDINGS: Enlarged cardiopericardial silhouette is stable compared to prior.  Atherosclerotic calcification of the aorta.  Prosthetic aortic valve.  Small right-sided pleural effusion is stable.  Mildly increased interstitial markings within the right lung.  Left posterior rib fractures which appear subacute or chronic.  Osteopenia.     Small right pleural effusion and findings of mild asymmetric interstitial edema. Stable cardiomegaly. Electronically signed by: Harish Liu MD Date:    03/14/2020 Time:    07:34

## 2020-03-17 NOTE — PHYSICIAN QUERY
"PT Name: Griselda Neely  MR #: [id number]    Physician Query Form - Consultant Diagnosis Clarification     CDS/: José Miguel Le               Contact information: [phone]  This form is a permanent document in the medical record.     Query Date: March 17, 2020      By submitting this query, we are merely seeking further clarification of documentation.  Please utilize your independent clinical judgment when addressing the question(s) below.    The Medical record contains the following:   Diagnosis Location in Medical Record   PMHx of CHF...ESRD (end stage renal disease) on HD M,W, F 3/14 Dr. Maria   "Pulmonary edema, decompensated CHF" 3/14 and 3/15 Dr. Gupta ·   "Severely decreased left ventricular systolic function. The estimated ejection fraction is 25%.  Grade IV (severe) left ventricular diastolic dysfunction."    "7. Systolic and diastolic HF - Frequent episodes of pulmonary edema" 3/16 Dr. Ingram     1. Do you agree with Dr. Gupta's diagnosis of decompensated CHF?    [  ] Yes   [  ] Yes, but it resolved prior to my assessment of the patient   [  ] No   [  ] Other/Clarification of findings:   [  ] Clinically undetermined     2. Do you agree w/ Dr. Ingram's diagnosis of systolic and diastolic CHF?    [  ] Yes   [  ] Yes, but it resolved prior to my assessment of the patient   [ X ] No   [  ] Other/Clarification of findings:   [  ] Clinically undetermined                                          "

## 2020-03-17 NOTE — ASSESSMENT & PLAN NOTE
Known history of atrial fibrillation.  At home on Coumadin which is now transition to heparin drip.

## 2020-03-17 NOTE — PROGRESS NOTES
Replaced by [facility] Medicine  Progress Note    Patient Name: Griselda Neely  MRN: [id number]  Patient Class: IP- Inpatient   Admission Date: 3/14/2020  Length of Stay: 2 days  Attending Physician: Lissa Portillo MD  Primary Care Provider: Kalie Neely MD        Subjective:     Principal Problem:Arterial occlusion due to arteriosclerosis        HPI:  74 year old patient getting admitted with LLE arterial occlusion  Patient started having pain few days ago. 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 Portillo, have assumed care of this patient from 3/17/20.  Patient admitted with left lower extremity pain, arterial ultrasound with absent posterior tibialis, dorsalis pedis and peritoneal in left lower extremity concerning for peripheral vascular disease.  Patient with a history of left atrial thrombus and atrial fibrillation on Coumadin, supratherapeutic on presentation.  Coumadin was held and she was started on heparin infusion for anticoagulation.  Vascular surgery was consulted and angiogram +/- intervention was planned however INR remained elevated, discussed today with Dr. Khoobehi, would like INR< 2 (today INR 2.3) before proceeding with procedure, hopeful will occur tomorrow and patient is on scheduled for tomorrow.  States she has not been sleeping well, unknown reason why.  Continues to report bilateral shooting leg pain.  Patient is ESRD on HD MWF.  Patient is frustrated about waiting for procedure.  Discussed with patient and family at bedside.    Interval History:   Patient continues to report severe bilateral shooting sharp leg pains.  States she did not sleep since admitted to the hospital.  She is frustrated due to continued postponement of her angiogram.  Family member present at bedside.  Discussed with vascular surgery over the phone, planning angiogram once INR <2, hopeful for tomorrow.  Patient denies any bleeding currently on heparin drip.  Previous echocardiogram with EF of 25%, grade 4 diastolic dysfunction.  INR today 2.3.      Review of Systems   Constitutional: Negative for chills and fever.   HENT: Negative for congestion, sinus pressure and sinus pain.    Respiratory: Negative for shortness of breath.    Cardiovascular: Negative for chest pain, palpitations and leg swelling.   Gastrointestinal: Negative for constipation, nausea and vomiting.   Musculoskeletal:        Bilateral leg pain   Skin: Positive for wound (Due to calciphylaxis, present left inner thigh).   Neurological: Negative for seizures, syncope and speech difficulty.   Hematological: Does not bruise/bleed easily.   Psychiatric/Behavioral: Positive for dysphoric mood and sleep disturbance.     Objective:     Vital Signs (Most Recent):  Temp: 97.4 °F (36.3 °C) (03/17/20 1113)  Pulse: 76 (03/17/20 1545)  Resp: 18 (03/17/20 1545)  BP: 126/74 (03/17/20 1545)  SpO2: 100 % (03/17/20 1545) Vital Signs (24h Range):  Temp:  [97.4 °F (36.3 °C)-98.5 °F (36.9 °C)] 97.4 °F (36.3 °C)  Pulse:  [64-85] 76  Resp:  [16-18] 18  SpO2:  [98 %-100 %] 100 %  BP: (126-165)/(74-99) 126/74     Weight: 59.9 kg (132 lb 1.6 oz)  Body mass index is 21.98 kg/m².    Intake/Output Summary (Last 24 hours) at 3/17/2020 1610  Last data filed at 3/17/2020 0000  Gross per 24 hour   Intake 240 ml   Output --   Net 240 ml      Physical Exam   Constitutional: She is oriented to person, place, and time. No distress.    female lying in bed, on mobile phone   HENT:   Head: Normocephalic and atraumatic.   Eyes: Right eye  exhibits no discharge. Left eye exhibits no discharge.   Neck: Neck supple.   Cardiovascular: Normal rate and regular rhythm.   Pulmonary/Chest: No stridor. No respiratory distress.   Rhonchorous breath sounds, no wheeze appreciated, on supplemental oxygen   Abdominal: Soft. Bowel sounds are normal. She exhibits no distension. There is no tenderness.   Neurological: She is alert and oriented to person, place, and time.   Speech intact.  Moving all 4 extremities.   Skin: Capillary refill takes more than 3 seconds. She is not diaphoretic.   Wound with overlying Mepilex left inner thigh, right upper extremity AVF with overlying dressing   Psychiatric:   Intermittently irritable at times   Vitals reviewed.      Significant Labs:   BMP:   Recent Labs   Lab 03/17/20  1209   GLU 87   *   K 4.0   CL 96   CO2 24   BUN 26*   CREATININE 3.6*   CALCIUM 8.4*   MG 2.0     CBC:   Recent Labs   Lab 03/16/20  0455 03/17/20  1209   WBC 7.40 7.72   HGB 10.1* 9.7*   HCT 32.6* 31.3*    192     CMP:   Recent Labs   Lab 03/16/20  0455 03/17/20  1209   * 135*   K 4.1 4.0   CL 90* 96   CO2 25 24   GLU 94 87   BUN 39* 26*   CREATININE 4.3* 3.6*   CALCIUM 8.1* 8.4*   PROT 7.3 7.2   ALBUMIN 2.7* 2.6*   BILITOT 1.1* 0.9   ALKPHOS 163* 135   AST 47* 26   ALT 39 27   ANIONGAP 17* 15   EGFRNONAA 9.6* 11.9*     Coagulation:   Recent Labs   Lab 03/17/20  0504 03/17/20  1210   INR 2.3  --    APTT 89.8* 63.2*     Lactic Acid: No results for input(s): LACTATE in the last 48 hours.  Magnesium:   Recent Labs   Lab 03/16/20  0455 03/17/20  1209   MG 2.2 2.0     POCT Glucose: No results for input(s): POCTGLUCOSE in the last 48 hours.  Troponin: No results for input(s): TROPONINI in the last 48 hours.  TSH:   Recent Labs   Lab 09/29/19  1049   TSH 3.155     Urine Culture: No results for input(s): LABURIN in the last 48 hours.  Urine Studies: No results for input(s): COLORU, APPEARANCEUA, PHUR, SPECGRAV, PROTEINUA, GLUCUA, KETONESU,  BILIRUBINUA, OCCULTUA, NITRITE, UROBILINOGEN, LEUKOCYTESUR, RBCUA, WBCUA, BACTERIA, SQUAMEPITHEL, HYALINECASTS in the last 48 hours.    Invalid input(s): BRITTANY  All pertinent labs within the past 24 hours have been reviewed.    Significant Imaging: I have reviewed all pertinent imaging results/findings within the past 24 hours.     X-ray Femur Ap/lat Left    Result Date: 3/14/2020  EXAMINATION: XR FEMUR 2 VIEW LEFT CLINICAL HISTORY: Pain, unspecified COMPARISON: None available FINDINGS: No acute fracture or malalignment of the left femur.  Osteopenia.  Vascular calcifications noted.  Mild osteoarthrosis of the left hip.     No acute osseous abnormality. Electronically signed by: Harish Liu MD Date:    03/14/2020 Time:    07:36    X-ray Tibia Fibula 2 View Left    Result Date: 3/14/2020  EXAMINATION: XR TIBIA FIBULA 2 VIEW LEFT CLINICAL HISTORY: Unspecified fall, initial encounter COMPARISON: None available FINDINGS: No acute fracture or malalignment of the tibia or fibula.  Osteopenia.  Femoral tibial chondrocalcinosis.  Vascular calcifications noted.     No acute osseous abnormality. Electronically signed by: Harish Liu MD Date:    03/14/2020 Time:    07:36    X-ray Foot Complete Left    Result Date: 3/14/2020  EXAMINATION: XR FOOT COMPLETE 3 VIEW LEFT CLINICAL HISTORY: Unspecified fall, initial encounter COMPARISON: None available FINDINGS: No acute fracture or malalignment of the left foot.  Joint spaces are maintained.  Vascular calcifications noted.  Enthesopathy of the calcaneus.     No acute osseous abnormality. Electronically signed by: Harish Liu MD Date:    03/14/2020 Time:    07:38    Us Lower Extremity Arteries Left    Result Date: 3/14/2020  EXAMINATION: US LOWER EXTREMITY ARTERIES LEFT CLINICAL HISTORY: Pain in leg, unspecified TECHNIQUE: Grayscale, color Doppler, and spectral Doppler evaluation of left lower extremity arterial system was performed. COMPARISON: None FINDINGS: No  arterial flow identified within the posterior tibial, peroneal, and dorsalis pedis arteries. Common femoral artery, profunda femoris artery, superficial femoral artery, popliteal artery, and anterior tibial artery are patent.  No markedly elevated peak systolic velocities, although scattered atherosclerotic plaque is evident.  Tardus parvus waveform noted within the left anterior tibial artery.     No Doppler flow identified within posterior tibial, peroneal, or dorsalis pedis arteries consistent with occlusion. ANETTE demonstrates parvus tardus waveform, consistent with significant upstream stenosis. Doppler findings of peripheral vascular disease. Electronically signed by: Harish Liu MD Date:    03/14/2020 Time:    10:48    X-ray Chest Ap Portable    Result Date: 3/14/2020  EXAMINATION: XR CHEST AP PORTABLE CLINICAL HISTORY: Shortness of Breath; COMPARISON: 02/03/2020 FINDINGS: Enlarged cardiopericardial silhouette is stable compared to prior.  Atherosclerotic calcification of the aorta.  Prosthetic aortic valve.  Small right-sided pleural effusion is stable.  Mildly increased interstitial markings within the right lung.  Left posterior rib fractures which appear subacute or chronic.  Osteopenia.     Small right pleural effusion and findings of mild asymmetric interstitial edema. Stable cardiomegaly. Electronically signed by: Harish Liu MD Date:    03/14/2020 Time:    07:34        Assessment/Plan:      * Peripheral vascular disease now with left lower extremity occlusion  Patient admitted with severe PVD of LLE  No Doppler flow identified within posterior tibial, peroneal, or dorsalis pedis arteries consistent with occlusion per [facility] report  Patient was previously on Coumadin for cardiac thrombus as well as atrial fibrillation.  Coumadin currently on hold an on heparin drip as per infusion portable however INR still 2.3 today  Continue neurovascular checks  Renal diet today and NPO midnight  Continue to  hold Coumadin and continue heparin drip  Appreciate vascular surgery input, tentatively planning angiogram tomorrow once INR <2    Other persistent atrial fibrillation  Known history of atrial fibrillation.  At home on Coumadin which is now transition to heparin drip.      Chronic pain on chronic narcotics  Continue Norco 7.5, low-dose IV Dilaudid.  Monitor for signs of over sedation.      Anemia  Chronic anemia.  Trending especially while on heparin drip.      Chronic combined systolic and diastolic heart failure  Previous echo with EF of 25% with grade 4 diastolic dysfunction.  Patient has tendency a pitting pulmonary edema with recurrent hospitalizations for same.      Gout  No evidence of acute flare at this time.      H/O mitral valve replacement  Patient with a history of mitral valve replacement, bioprosthetic.      Left atrial thrombus  Patient was on Coumadin at home.  Currently on heparin drip.      HLD (hyperlipidemia)  Continue statin      Chronic respiratory failure  Chronic renal failure on 3 L home oxygen at baseline.      Pulmonary hypertension  Last echo with PASP 50.      ESRD (end stage renal disease) on HD M,W, F  ESRD on dialysis MWF via right upper extremity AVF.  Patient has tendency for getting pulmonary edema and volume overload.  Respiratory exam does have rhonchorous breath sounds however denies any respiratory complaints.  BNP > 4500.  Renally dose all medications and avoid nephrotoxin drugs  Dr. Ingram following for Nephrology, dialysis as per schedule    Tobacco dependence  Patient continues to smoke.  Not interested in smoking cessation counseling.      HTN (hypertension)  Restart home medications and monitor.        VTE Risk Mitigation (From admission, onward)         Ordered     heparin 25,000 units in dextrose 5% 250 mL (100 units/mL) infusion HIGH INTENSITY nomogram - [facility]  Continuous     Question:  Heparin Infusion Adjustment (DO NOT MODIFY ANSWER)  Answer:   [email or web address]\Images\Pharmacy\HeparinInfusions\heparin HIGH INTENSITY nomogram for [facility] [id number]    03/14/20 1157     heparin 25,000 units in dextrose 5% (100 units/ml) IV bolus from bag - ADDITIONAL PRN BOLUS - 60 units/kg  As needed (PRN)     Question:  Heparin Infusion Adjustment (DO NOT MODIFY ANSWER)  Answer:  [email or web address]\epic\Images\Pharmacy\HeparinInfusions\heparin HIGH INTENSITY nomogram for [facility] [id number].pdf    03/14/20 1157     heparin 25,000 units in dextrose 5% (100 units/ml) IV bolus from bag - ADDITIONAL PRN BOLUS - 30 units/kg  As needed (PRN)     Question:  Heparin Infusion Adjustment (DO NOT MODIFY ANSWER)  Answer:  [email or web address]\epic\Images\Pharmacy\HeparinInfusions\heparin HIGH INTENSITY nomogram for [facility] [id number].pdf    03/14/20 1157     IP VTE HIGH RISK PATIENT  Once      03/14/20 1157                      Lissa Portillo MD  Department of Hospital Medicine   [facility]

## 2020-03-17 NOTE — ASSESSMENT & PLAN NOTE
Patient admitted with severe PVD of LLE  No Doppler flow identified within posterior tibial, peroneal, or dorsalis pedis arteries consistent with occlusion per [facility] report  pt on heparin infusion and Follow [facility] protocol . INR already High  Per Vascular surgeon angiography and possible endovascular intervention Tmrw if INR is low  Postponed angiogram/intervention today because of High INR

## 2020-03-17 NOTE — ASSESSMENT & PLAN NOTE
Previous echo with EF of 25% with grade 4 diastolic dysfunction.  Patient has tendency a pitting pulmonary edema with recurrent hospitalizations for same.

## 2020-03-17 NOTE — PROGRESS NOTES
Progress Note  [facility]    Admit Date: 3/14/2020   LOS: 2 days     SUBJECTIVE:     Follow-up For:  ESRD    Patient denies any complaints except for her leg pain.  She is upset that she is not getting her procedure done.  I explained that was related to her bleeding time.    Review of Systems:  GENERAL: NO fever, chills, night sweats, decreased intake  HEENT: NO blurry vision, glasses, floaters, hearing defects, sore throat  CV:  Swelling lower extremities.  NO CP, Palpitations, Arrhythmias  PULM:  SOB improved.  NO Cough, Hemoptysis, PND, Orthopnea  GI:  NO Nausea, Vomiting, Diarrhea, BRBPR, Melena, Abdominal Pain  :  NO Dysuria, Frequency, Urgency, Hematuria  NEURO: NO LOC, Seizure activity, Dizziness  SKIN:  Left thigh lesion improved.  NO Rash, Pruritis, Petechiae  MS:  Bone pain bilateral lower extremities.  NO Arthralgia, Muscle Aches, Arthritis      OBJECTIVE:     Vital Signs (Most Recent)  Temp: 97.4 °F (36.3 °C) (03/17/20 1113)  Pulse: 74 (03/17/20 1113)  Resp: 18 (03/17/20 1113)  BP: (!) 154/74 (03/17/20 1113)  SpO2: 98 % (03/17/20 1113)    Vital Signs Range (Last 24H):  Temp:  [97.4 °F (36.3 °C)-98.5 °F (36.9 °C)]   Pulse:  [64-85]   Resp:  [16-18]   BP: (137-165)/(68-99)   SpO2:  [98 %-99 %]     I/O last 3 completed shifts:  In: 980 [P.O.:480; Other:500]  Out: 1500 [Other:1500]    Physical Exam:   General: Awake and alert. In distress because of leg pain.  HEENT: No scleral icterus, MM dry.   NECK:  Marked JVD. Supple,  No swelling, No masses.  CV: Regular rate and rhthym without murmurs, rubs, gallops.  PULM:  Still with coarse rhonchi.  ABD: Soft, nl BS, NT, ND.  EXTR:  1-2+ edema lower extremities.  No clubbing, cyanosis.   NEURO: Non focal and grossly intact.  SKIN:  Legs very tender to touch.  Left upper leg ulcer bandaged.  MS: No joint effusions.   PSYCH: Normal Mood.    Laboratory:  Recent Labs   Lab 03/17/20  1209   HGB 9.7*   HCT 31.3*   WBC 7.72           Recent Labs   Lab 03/15/20  0228 03/16/20  0455 03/16/20  0938 03/17/20  1209   * 132*  --  135*   K 3.7 4.1  --  4.0   CL 91* 90*  --  96   CO2 28 25  --  24   BUN 28* 39*  --  26*   CREATININE 3.4* 4.3*  --  3.6*   GLU 99 94  --  87   CALCIUM 8.3* 8.1*  --  8.4*   PHOS  --   --  4.4 3.2     ..  Lab Results   Component Value Date    INR 2.3 03/17/2020    INR 3.2 03/16/2020    INR 4.6 03/15/2020     ..  Lab Results   Component Value Date    URICACID 4.0 03/17/2020       BNP  Recent Labs   Lab 03/14/20  0548 03/17/20  1209   BNP >4,500* >4,500*     ECHO  Reading physician: Gordon Eason MD Ordering physician: Keyur Bravo MD Study date: 1/3/20   Reason for Exam   Priority: Routine   Dx: SOB (shortness of breath) [R06.02 (ICD-10-CM)]   Comments:    Conclusion     · Eccentric left ventricular hypertrophy.  · Severely decreased left ventricular systolic function. The estimated ejection fraction is 25%.  · Grade IV (severe) left ventricular diastolic dysfunction.  · Moderate right ventricular enlargement.  · Moderately reduced right ventricular systolic function.  · Severe left atrial enlargement.  · Layered left atrial thrombus suggested. The thrombus is fixed and located in the inferior cavity.  · Moderate right atrial enlargement.  · There is a bioprosthetic mitral valve. Prosthetic mitral valve is normal.  · Moderate tricuspid regurgitation.  · Mild to moderate pulmonary hypertension present. Estimated PA systolic pressure 50 mm of Hg.  · Mild to moderate pulmonic regurgitation.  · Small posterior pericardial effusion.       Problem List:    Active Hospital Problems    Diagnosis  POA    *Arterial occlusion due to arteriosclerosis [I70.90]  Yes    Left atrial thrombus [I51.3]  Yes    HLD (hyperlipidemia) [E78.5]  Yes     Chronic    Chronic respiratory failure [J96.10]  Yes     Chronic     3 L O2 at home      Calciphylaxis [E83.59]  Yes     Chronic    Other persistent atrial fibrillation  [I48.19]  Yes    ESRD (end stage renal disease) on HD M,W, F [N18.6]  Yes     Chronic    Tobacco dependence [F17.200]  Yes     Chronic    HTN (hypertension) [I10]  Yes     Chronic      Resolved Hospital Problems   No resolved problems to display.       Nephrology Assessment/Plan:     1. Pulmonary edema  Improved with UF/HD  Follow-up BNP and chest x-ray in a.m.  Monitor     2. ESRD  HD MWF  UF as tolerated     3. HTN  Better with UF  Monitor     4. S/P MVR  INR improving for procedure     5. Anemia  Near ESRD goal  DONNY with HD  Monitor     6. Hypo Na+  Improved with UF  Monitor     7. Systolic and diastolic HF  Frequent episodes of pulmonary edema  Ultrafiltration with dialysis    8. Left great toe pain  Uric acid okay    9. Secondary HPT  Calcium corrects for hypoalbuminemia  Phosphorus good    Joseph Ingram M.D.

## 2020-03-17 NOTE — PROGRESS NOTES
[facility] Medicine  Progress Note    Patient Name: Griselda Neely  MRN: [id number]  Patient Class: IP- Inpatient   Admission Date: 3/14/2020  Length of Stay: 1 days  Attending Physician: Franky Maria MD  Primary Care Provider: Kalie Neely MD        Subjective:     Principal Problem:Arterial occlusion due to arteriosclerosis        HPI:  74 year old patient getting admitted with LLE arterial occlusion  Patient started having pain few days ago. 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   HENT: Negative for congestion and dental problem.    Eyes: Negative for discharge and itching.   Respiratory: Negative for shortness of breath.    Cardiovascular: Negative for chest pain.   Gastrointestinal: Negative for abdominal distention and abdominal pain.   Endocrine: Negative for cold intolerance.   Genitourinary: Negative for difficulty urinating and dysuria.   Musculoskeletal: Negative for arthralgias and back pain.   Skin: Negative for color change.   Neurological: Negative for dizziness and facial asymmetry.   Hematological: Negative for adenopathy.   Psychiatric/Behavioral: Negative for agitation and behavioral problems.     Objective:     Vital Signs (Most Recent):  Temp: 97.5 °F (36.4 °C) (03/16/20 1531)  Pulse: 71 (03/16/20 1531)  Resp: 16 (03/16/20 1531)  BP: 137/68 (03/16/20 1531)  SpO2: 100 % (03/16/20 0835) Vital Signs (24h Range):  Temp:  [97.5 °F (36.4 °C)-98.2 °F  (36.8 °C)] 97.5 °F (36.4 °C)  Pulse:  [70-91] 71  Resp:  [16-18] 16  SpO2:  [96 %-100 %] 100 %  BP: (116-166)/(54-80) 137/68     Weight: 59.9 kg (132 lb 1.6 oz)  Body mass index is 21.98 kg/m².    Intake/Output Summary (Last 24 hours) at 3/16/2020 1915  Last data filed at 3/16/2020 1542  Gross per 24 hour   Intake 740 ml   Output 1500 ml   Net -760 ml      Physical Exam   Constitutional: She is oriented to person, place, and time. No distress.   Eyes: Pupils are equal, round, and reactive to light. EOM are normal.   Neck: Neck supple.   Cardiovascular: Normal rate.   Pulmonary/Chest: Effort normal and breath sounds normal.   Abdominal: Soft. Bowel sounds are normal.   Musculoskeletal: Normal range of motion. She exhibits no edema.   Neurological: She is alert and oriented to person, place, and time.   Skin: Skin is warm.   Psychiatric: She has a normal mood and affect.   Nursing note and vitals reviewed.      Significant Labs:   CBC:   Recent Labs   Lab 03/15/20  0228 03/16/20  0455   WBC 8.52  8.52 7.40   HGB 9.7*  9.7* 10.1*   HCT 32.7*  32.7* 32.6*     170 170     CMP:   Recent Labs   Lab 03/15/20  0228 03/16/20  0455   * 132*   K 3.7 4.1   CL 91* 90*   CO2 28 25   GLU 99 94   BUN 28* 39*   CREATININE 3.4* 4.3*   CALCIUM 8.3* 8.1*   PROT 7.3 7.3   ALBUMIN 2.8* 2.7*   BILITOT 1.3* 1.1*   ALKPHOS 165* 163*   AST 89* 47*   ALT 52* 39   ANIONGAP 15 17*   EGFRNONAA 12.8* 9.6*             Assessment/Plan:      * Arterial occlusion due to arteriosclerosis  Patient admitted with severe PVD of LLE  No Doppler flow identified within posterior tibial, peroneal, or dorsalis pedis arteries consistent with occlusion per [facility] report  pt on heparin infusion and Follow [facility] protocol . INR already High  Per Vascular surgeon angiography and possible endovascular intervention Tmrw if INR is low  Postponed angiogram/intervention today because of High INR      Left atrial thrombus  On Coumadin at home      HLD  (hyperlipidemia)  Continue statins      Chronic respiratory failure  On 3 L o2 at Home      Other persistent atrial fibrillation  Persistent A Fib  Continue usual medications      Calciphylaxis  Chronic ongoing issue in the background of end-stage renal disease  Pain medications p.r.n. basis    ESRD (end stage renal disease) on HD M,W, F  HD Per Nephro MD  Pt had HD today      Tobacco dependence  Tobacco cessation counselling done      HTN (hypertension)  Continue Present medications        VTE Risk Mitigation (From admission, onward)         Ordered     heparin 25,000 units in dextrose 5% 250 mL (100 units/mL) infusion HIGH INTENSITY nomogram - [facility]  Continuous     Question:  Heparin Infusion Adjustment (DO NOT MODIFY ANSWER)  Answer:  [email or web address]\epic\Images\Pharmacy\HeparinInfusions\heparin HIGH INTENSITY nomogram for [facility] [id number].pdf    03/14/20 1157     heparin 25,000 units in dextrose 5% (100 units/ml) IV bolus from bag - ADDITIONAL PRN BOLUS - 60 units/kg  As needed (PRN)     Question:  Heparin Infusion Adjustment (DO NOT MODIFY ANSWER)  Answer:  [email or web address]\epic\Images\Pharmacy\HeparinInfusions\heparin HIGH INTENSITY nomogram for [facility] [id number].pdf    03/14/20 1157     heparin 25,000 units in dextrose 5% (100 units/ml) IV bolus from bag - ADDITIONAL PRN BOLUS - 30 units/kg  As needed (PRN)     Question:  Heparin Infusion Adjustment (DO NOT MODIFY ANSWER)  Answer:  [email or web address]\epic\Images\Pharmacy\HeparinInfusions\heparin HIGH INTENSITY nomogram for [facility] [id number]    03/14/20 1157     IP VTE HIGH RISK PATIENT  Once      03/14/20 1157                      Franky Maria MD  Department of Hospital Medicine   [facility]

## 2020-03-17 NOTE — SUBJECTIVE & OBJECTIVE
Interval History:     Review of Systems   Constitutional: Negative for activity change and appetite change.   HENT: Negative for congestion and dental problem.    Eyes: Negative for discharge and itching.   Respiratory: Negative for shortness of breath.    Cardiovascular: Negative for chest pain.   Gastrointestinal: Negative for abdominal distention and abdominal pain.   Endocrine: Negative for cold intolerance.   Genitourinary: Negative for difficulty urinating and dysuria.   Musculoskeletal: Negative for arthralgias and back pain.   Skin: Negative for color change.   Neurological: Negative for dizziness and facial asymmetry.   Hematological: Negative for adenopathy.   Psychiatric/Behavioral: Negative for agitation and behavioral problems.     Objective:     Vital Signs (Most Recent):  Temp: 97.5 °F (36.4 °C) (03/16/20 1531)  Pulse: 71 (03/16/20 1531)  Resp: 16 (03/16/20 1531)  BP: 137/68 (03/16/20 1531)  SpO2: 100 % (03/16/20 0835) Vital Signs (24h Range):  Temp:  [97.5 °F (36.4 °C)-98.2 °F (36.8 °C)] 97.5 °F (36.4 °C)  Pulse:  [70-91] 71  Resp:  [16-18] 16  SpO2:  [96 %-100 %] 100 %  BP: (116-166)/(54-80) 137/68     Weight: 59.9 kg (132 lb 1.6 oz)  Body mass index is 21.98 kg/m².    Intake/Output Summary (Last 24 hours) at 3/16/2020 1915  Last data filed at 3/16/2020 1542  Gross per 24 hour   Intake 740 ml   Output 1500 ml   Net -760 ml      Physical Exam   Constitutional: She is oriented to person, place, and time. No distress.   Eyes: Pupils are equal, round, and reactive to light. EOM are normal.   Neck: Neck supple.   Cardiovascular: Normal rate.   Pulmonary/Chest: Effort normal and breath sounds normal.   Abdominal: Soft. Bowel sounds are normal.   Musculoskeletal: Normal range of motion. She exhibits no edema.   Neurological: She is alert and oriented to person, place, and time.   Skin: Skin is warm.   Psychiatric: She has a normal mood and affect.   Nursing note and vitals reviewed.      Significant Labs:    CBC:   Recent Labs   Lab 03/15/20  0228 03/16/20  0455   WBC 8.52  8.52 7.40   HGB 9.7*  9.7* 10.1*   HCT 32.7*  32.7* 32.6*     170 170     CMP:   Recent Labs   Lab 03/15/20  0228 03/16/20  0455   * 132*   K 3.7 4.1   CL 91* 90*   CO2 28 25   GLU 99 94   BUN 28* 39*   CREATININE 3.4* 4.3*   CALCIUM 8.3* 8.1*   PROT 7.3 7.3   ALBUMIN 2.8* 2.7*   BILITOT 1.3* 1.1*   ALKPHOS 165* 163*   AST 89* 47*   ALT 52* 39   ANIONGAP 15 17*   EGFRNONAA 12.8* 9.6*

## 2020-03-18 PROBLEM — N18.6 STAGE 5 CHRONIC KIDNEY DISEASE ON CHRONIC DIALYSIS: Status: ACTIVE | Noted: 2020-03-18

## 2020-03-18 PROBLEM — Z99.2 STAGE 5 CHRONIC KIDNEY DISEASE ON CHRONIC DIALYSIS: Status: ACTIVE | Noted: 2020-03-18

## 2020-03-18 LAB
ABO + RH BLD: NORMAL
ALBUMIN SERPL BCP-MCNC: 2.8 G/DL (ref 3.5–5.2)
ALP SERPL-CCNC: 134 U/L (ref 55–135)
ALT SERPL W/O P-5'-P-CCNC: 23 U/L (ref 10–44)
ANION GAP SERPL CALC-SCNC: 11 MMOL/L (ref 8–16)
APTT PPP: 53.3 SEC (ref 23.6–33.3)
AST SERPL-CCNC: 20 U/L (ref 10–40)
BASOPHILS # BLD AUTO: 0.02 K/UL (ref 0–0.2)
BASOPHILS NFR BLD: 0.2 % (ref 0–1.9)
BILIRUB SERPL-MCNC: 1.2 MG/DL (ref 0.1–1)
BLD GP AB SCN CELLS X3 SERPL QL: NORMAL
BUN SERPL-MCNC: 33 MG/DL (ref 8–23)
CALCIUM SERPL-MCNC: 8.2 MG/DL (ref 8.7–10.5)
CHLORIDE SERPL-SCNC: 96 MMOL/L (ref 95–110)
CO2 SERPL-SCNC: 27 MMOL/L (ref 23–29)
CREAT SERPL-MCNC: 4.5 MG/DL (ref 0.5–1.4)
DIFFERENTIAL METHOD: ABNORMAL
EOSINOPHIL # BLD AUTO: 0.1 K/UL (ref 0–0.5)
EOSINOPHIL NFR BLD: 0.7 % (ref 0–8)
ERYTHROCYTE [DISTWIDTH] IN BLOOD BY AUTOMATED COUNT: 18.2 % (ref 11.5–14.5)
EST. GFR  (AFRICAN AMERICAN): 10.5 ML/MIN/1.73 M^2
EST. GFR  (NON AFRICAN AMERICAN): 9.1 ML/MIN/1.73 M^2
GLUCOSE SERPL-MCNC: 89 MG/DL (ref 70–110)
HCT VFR BLD AUTO: 32.9 % (ref 37–48.5)
HGB BLD-MCNC: 10 G/DL (ref 12–16)
IMM GRANULOCYTES # BLD AUTO: 0.04 K/UL (ref 0–0.04)
IMM GRANULOCYTES NFR BLD AUTO: 0.5 % (ref 0–0.5)
INR PPP: 2.2
LYMPHOCYTES # BLD AUTO: 0.8 K/UL (ref 1–4.8)
LYMPHOCYTES NFR BLD: 8.9 % (ref 18–48)
MAGNESIUM SERPL-MCNC: 2.1 MG/DL (ref 1.6–2.6)
MCH RBC QN AUTO: 26.1 PG (ref 27–31)
MCHC RBC AUTO-ENTMCNC: 30.4 G/DL (ref 32–36)
MCV RBC AUTO: 86 FL (ref 82–98)
MONOCYTES # BLD AUTO: 0.5 K/UL (ref 0.3–1)
MONOCYTES NFR BLD: 5.6 % (ref 4–15)
NEUTROPHILS # BLD AUTO: 7.2 K/UL (ref 1.8–7.7)
NEUTROPHILS NFR BLD: 84.1 % (ref 38–73)
NRBC BLD-RTO: 0 /100 WBC
PHOSPHATE SERPL-MCNC: 3.9 MG/DL (ref 2.7–4.5)
PLATELET # BLD AUTO: 216 K/UL (ref 150–350)
PMV BLD AUTO: 11.7 FL (ref 9.2–12.9)
POC ACTIVATED CLOTTING TIME K: 136 SEC (ref 74–137)
POTASSIUM SERPL-SCNC: 4.1 MMOL/L (ref 3.5–5.1)
PROT SERPL-MCNC: 7.4 G/DL (ref 6–8.4)
PROTHROMBIN TIME: 23.5 SEC (ref 10.6–14.8)
RBC # BLD AUTO: 3.83 M/UL (ref 4–5.4)
SAMPLE: NORMAL
SODIUM SERPL-SCNC: 134 MMOL/L (ref 136–145)
WBC # BLD AUTO: 8.55 K/UL (ref 3.9–12.7)

## 2020-03-18 PROCEDURE — 83735 ASSAY OF MAGNESIUM: CPT

## 2020-03-18 PROCEDURE — 63600175 PHARM REV CODE 636 W HCPCS: Performed by: INTERNAL MEDICINE

## 2020-03-18 PROCEDURE — 21400001 HC TELEMETRY ROOM

## 2020-03-18 PROCEDURE — 85025 COMPLETE CBC W/AUTO DIFF WBC: CPT

## 2020-03-18 PROCEDURE — 36415 COLL VENOUS BLD VENIPUNCTURE: CPT

## 2020-03-18 PROCEDURE — 63600175 PHARM REV CODE 636 W HCPCS: Performed by: SURGERY

## 2020-03-18 PROCEDURE — 25000003 PHARM REV CODE 250: Performed by: SURGERY

## 2020-03-18 PROCEDURE — 80053 COMPREHEN METABOLIC PANEL: CPT

## 2020-03-18 PROCEDURE — 84100 ASSAY OF PHOSPHORUS: CPT

## 2020-03-18 PROCEDURE — 75710 ARTERY X-RAYS ARM/LEG: CPT | Mod: LT | Performed by: SURGERY

## 2020-03-18 PROCEDURE — C1769 GUIDE WIRE: HCPCS | Performed by: SURGERY

## 2020-03-18 PROCEDURE — 36247 INS CATH ABD/L-EXT ART 3RD: CPT | Mod: LT | Performed by: SURGERY

## 2020-03-18 PROCEDURE — 25000003 PHARM REV CODE 250: Performed by: INTERNAL MEDICINE

## 2020-03-18 PROCEDURE — C1894 INTRO/SHEATH, NON-LASER: HCPCS | Performed by: SURGERY

## 2020-03-18 PROCEDURE — C1887 CATHETER, GUIDING: HCPCS | Performed by: SURGERY

## 2020-03-18 PROCEDURE — 86901 BLOOD TYPING SEROLOGIC RH(D): CPT

## 2020-03-18 PROCEDURE — 85730 THROMBOPLASTIN TIME PARTIAL: CPT

## 2020-03-18 PROCEDURE — 27201423 OPTIME MED/SURG SUP & DEVICES STERILE SUPPLY: Performed by: SURGERY

## 2020-03-18 PROCEDURE — 25500020 PHARM REV CODE 255: Performed by: SURGERY

## 2020-03-18 PROCEDURE — 96374 THER/PROPH/DIAG INJ IV PUSH: CPT | Performed by: SURGERY

## 2020-03-18 PROCEDURE — 90935 HEMODIALYSIS ONE EVALUATION: CPT

## 2020-03-18 PROCEDURE — 85610 PROTHROMBIN TIME: CPT

## 2020-03-18 RX ORDER — HYDROCODONE BITARTRATE AND ACETAMINOPHEN 500; 5 MG/1; MG/1
TABLET ORAL
Status: DISCONTINUED | OUTPATIENT
Start: 2020-03-18 | End: 2020-03-19 | Stop reason: HOSPADM

## 2020-03-18 RX ORDER — FENTANYL CITRATE 50 UG/ML
INJECTION, SOLUTION INTRAMUSCULAR; INTRAVENOUS
Status: DISCONTINUED | OUTPATIENT
Start: 2020-03-18 | End: 2020-03-18 | Stop reason: HOSPADM

## 2020-03-18 RX ORDER — MIDAZOLAM HYDROCHLORIDE 1 MG/ML
INJECTION INTRAMUSCULAR; INTRAVENOUS
Status: DISCONTINUED | OUTPATIENT
Start: 2020-03-18 | End: 2020-03-18 | Stop reason: HOSPADM

## 2020-03-18 RX ORDER — WARFARIN 2.5 MG/1
2.5 TABLET ORAL ONCE
Status: DISCONTINUED | OUTPATIENT
Start: 2020-03-18 | End: 2020-03-18

## 2020-03-18 RX ORDER — IODIXANOL 320 MG/ML
INJECTION, SOLUTION INTRAVASCULAR
Status: DISCONTINUED | OUTPATIENT
Start: 2020-03-18 | End: 2020-03-18 | Stop reason: HOSPADM

## 2020-03-18 RX ORDER — LIDOCAINE HYDROCHLORIDE 10 MG/ML
INJECTION, SOLUTION EPIDURAL; INFILTRATION; INTRACAUDAL; PERINEURAL
Status: DISCONTINUED | OUTPATIENT
Start: 2020-03-18 | End: 2020-03-18 | Stop reason: HOSPADM

## 2020-03-18 RX ADMIN — HYDROMORPHONE HYDROCHLORIDE 0.5 MG: 1 INJECTION, SOLUTION INTRAMUSCULAR; INTRAVENOUS; SUBCUTANEOUS at 11:03

## 2020-03-18 RX ADMIN — HYDROCODONE BITARTRATE AND ACETAMINOPHEN 1 TABLET: 7.5; 325 TABLET ORAL at 05:03

## 2020-03-18 RX ADMIN — HYDROMORPHONE HYDROCHLORIDE 0.5 MG: 1 INJECTION, SOLUTION INTRAMUSCULAR; INTRAVENOUS; SUBCUTANEOUS at 01:03

## 2020-03-18 RX ADMIN — EPOETIN ALFA-EPBX 3000 UNITS: 3000 INJECTION, SOLUTION INTRAVENOUS; SUBCUTANEOUS at 03:03

## 2020-03-18 NOTE — ASSESSMENT & PLAN NOTE
Previous echo with EF of 25% with grade 4 diastolic dysfunction.  Patient has tendency a pitting pulmonary edema with recurrent hospitalizations for same.     Name band;

## 2020-03-18 NOTE — NURSING
Nursing Transfer Note      3/18/2020     Transfer To: 2520    Transfer via stretcher    Transfer with cardiac monitoring  And o2    Transported by ana lilia prajapati rn

## 2020-03-18 NOTE — ASSESSMENT & PLAN NOTE
Patient admitted with severe PVD of LLE  No Doppler flow identified within posterior tibial, peroneal, or dorsalis pedis arteries consistent with occlusion per [facility] report  Patient was previously on Coumadin for cardiac thrombus as well as atrial fibrillation.  Coumadin currently on hold an on heparin drip as per infusion portable however INR 2.2 today  On 3/18 underwent left lower extremity angiogram by Dr. Khoobehi, long segment of calcified PT, small vessel disease, lesions were amendable to intervention due to severe calcification/size of vessel.  Heparin drip discontinued.   Renal diet reordered  Appreciate vascular surgery input

## 2020-03-18 NOTE — ASSESSMENT & PLAN NOTE
ESRD on dialysis MWF via right upper extremity AVF.  Patient has tendency for getting pulmonary edema and volume overload.  BNP > 4500.  Renally dose all medications and avoid nephrotoxin drugs  Dr. Ingram following for Nephrology- dialysis today with 1 1/2 L removal

## 2020-03-18 NOTE — PLAN OF CARE
Vital signs, cardiac and lab monitoring. Possible discharge in am . MD consult in am. Increase activity as tolerated. Bed alarm on. Watch for falls. Watch for sign and symptoms of bleeding. Breathing treatments and adjust oxygen as needed. Strict I & O. Daily weights.

## 2020-03-18 NOTE — SUBJECTIVE & OBJECTIVE
Interval History:  Discussed with vascular surgery, Dr. Khoobehi, lesion seen left PT however full long calcified segment, unable to perform any intervention, small vessel disease not amendable to intervention.  Access was via right groin.  INR today was 2.2 and initial plan was for FFP postprocedure however crackles heard examination and this was deferred.  Patient was seen during dialysis, complains of being cold wanting blankets to be replaced, does not want to wear her supplemental oxygen.  As per nurse plans to remove 1 1/2 L today.     Review of Systems   Constitutional: Positive for chills.   Respiratory: Negative for shortness of breath (does not want to wear oxygen).    Gastrointestinal: Negative for nausea and vomiting.   Endocrine: Positive for cold intolerance.   Musculoskeletal:        States leg cold and wants blankets replaced, leg pain   Neurological: Positive for weakness.   Psychiatric/Behavioral: Positive for sleep disturbance.     Objective:     Vital Signs (Most Recent):  Temp: 97.5 °F (36.4 °C) (03/18/20 1425)  Pulse: 91 (03/18/20 1600)  Resp: 20 (03/18/20 1400)  BP: (!) 153/87 (03/18/20 1600)  SpO2: 98 % (03/18/20 1400) Vital Signs (24h Range):  Temp:  [97.5 °F (36.4 °C)-98.1 °F (36.7 °C)] 97.5 °F (36.4 °C)  Pulse:  [] 91  Resp:  [18-30] 20  SpO2:  [91 %-100 %] 98 %  BP: (115-177)/(63-92) 153/87     Weight: 63.5 kg (139 lb 15.9 oz)  Body mass index is 23.3 kg/m².  No intake or output data in the 24 hours ending 03/18/20 1701   Physical Exam   Constitutional: She is oriented to person, place, and time. No distress.   Lying in bed, seen during dialysis, sleeping but does awaken, chronically ill-appearing   HENT:   Head: Normocephalic and atraumatic.   Eyes: Right eye exhibits no discharge. Left eye exhibits no discharge.   Neck: Neck supple.   Cardiovascular: Normal rate and regular rhythm.   Pulmonary/Chest: No stridor. No respiratory distress.   Rhonchorous breath sounds, no wheeze  appreciated   Abdominal: Soft. Bowel sounds are normal. She exhibits no distension. There is no tenderness.   Neurological: She is alert and oriented to person, place, and time.   Speech intact.  Moving all 4 extremities.   Skin: Capillary refill takes more than 3 seconds. She is not diaphoretic.   Wound with overlying Mepilex left inner thigh, right upper extremity AVF - connected. R groin angio access site with dressing, small amount of bleeding underlying   Vitals reviewed.      Significant Labs:   BMP:   Recent Labs   Lab 03/18/20  0526   GLU 89   *   K 4.1   CL 96   CO2 27   BUN 33*   CREATININE 4.5*   CALCIUM 8.2*   MG 2.1     CBC:   Recent Labs   Lab 03/17/20  1209 03/18/20  0526   WBC 7.72 8.55   HGB 9.7* 10.0*   HCT 31.3* 32.9*    216     CMP:   Recent Labs   Lab 03/17/20  1209 03/18/20  0526   * 134*   K 4.0 4.1   CL 96 96   CO2 24 27   GLU 87 89   BUN 26* 33*   CREATININE 3.6* 4.5*   CALCIUM 8.4* 8.2*   PROT 7.2 7.4   ALBUMIN 2.6* 2.8*   BILITOT 0.9 1.2*   ALKPHOS 135 134   AST 26 20   ALT 27 23   ANIONGAP 15 11   EGFRNONAA 11.9* 9.1*     Cardiac Markers:   Recent Labs   Lab 03/17/20  1209   BNP >4,500*     Magnesium:   Recent Labs   Lab 03/17/20  1209 03/18/20  0526   MG 2.0 2.1     POCT Glucose: No results for input(s): POCTGLUCOSE in the last 48 hours.    Significant Imaging: I have reviewed all pertinent imaging results/findings within the past 24 hours.

## 2020-03-18 NOTE — NURSING
Dr. Khoobehi contacted regarding FFP orders. Pt BNP elevated,  audible crackles in lungs. MD ordered not to transfuse pt since going to HD later today.

## 2020-03-18 NOTE — PROGRESS NOTES
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 Portillo, have assumed care of this patient from 3/17/20.  Patient admitted with left lower extremity pain, arterial ultrasound with absent posterior tibialis, dorsalis pedis and peritoneal in left lower extremity concerning for peripheral vascular disease.  Patient with a history of left atrial thrombus and atrial fibrillation on Coumadin, supratherapeutic on presentation.  Coumadin was held and she was started on heparin infusion for anticoagulation.  Vascular surgery was consulted and angiogram +/- intervention was planned however INR remained elevated, discussed today with Dr. Khoobehi, would like INR< 2 (today INR 2.3) before proceeding with procedure, hopeful will occur tomorrow and patient is on scheduled for tomorrow.  States she has not been sleeping well, unknown reason why.  Continues to report bilateral shooting leg pain.  Patient is ESRD on HD MWF.  Patient is frustrated about waiting for procedure.  Discussed with patient and family at bedside. On 3/18, INR 2.2, patient  did undergo LLE angiogram by Dr Khoobehi, severe calcified long segment occlusion of PT- no intervention was performed as well as small vessel disease not amenable to intervention. Initially plan was for FFP given right groin puncture site however crackles on examination and FFP deferred.  Patient seen during dialysis with plans for 1 1/2 L fluid removal.    Interval History:  Discussed with vascular surgery, Dr. Khoobehi, lesion seen left PT however full long calcified segment, unable to perform any intervention, small vessel disease not amendable to intervention.  Access was via right groin.  INR today was 2.2 and initial plan was for FFP postprocedure however crackles heard examination and this was deferred.  Patient was seen during dialysis, complains of being cold wanting blankets to be replaced, does not want to wear her supplemental oxygen.  As per nurse plans to remove 1 1/2 L today.     Review of Systems   Constitutional: Positive for chills.   Respiratory: Negative for shortness of breath (does not want to wear oxygen).    Gastrointestinal: Negative for nausea and vomiting.   Endocrine: Positive for cold intolerance.   Musculoskeletal:        States leg cold and wants blankets replaced, leg pain   Neurological: Positive for weakness.   Psychiatric/Behavioral: Positive for sleep disturbance.     Objective:     Vital Signs (Most Recent):  Temp: 97.5 °F (36.4 °C) (03/18/20 1425)  Pulse: 91 (03/18/20 1600)  Resp: 20 (03/18/20 1400)  BP: (!) 153/87 (03/18/20 1600)  SpO2: 98 % (03/18/20 1400) Vital Signs (24h Range):  Temp:  [97.5 °F (36.4 °C)-98.1 °F (36.7 °C)] 97.5 °F (36.4 °C)  Pulse:  [] 91  Resp:  [18-30] 20  SpO2:  [91 %-100 %] 98 %  BP: (115-177)/(63-92) 153/87     Weight: 63.5 kg (139 lb 15.9 oz)  Body mass index is 23.3 kg/m².  No intake or output data in the 24 hours ending 03/18/20 1701   Physical Exam   Constitutional: She is oriented to person, place, and time. No distress.   Lying in bed,  seen during dialysis, sleeping but does awaken, chronically ill-appearing   HENT:   Head: Normocephalic and atraumatic.   Eyes: Right eye exhibits no discharge. Left eye exhibits no discharge.   Neck: Neck supple.   Cardiovascular: Normal rate and regular rhythm.   Pulmonary/Chest: No stridor. No respiratory distress.   Rhonchorous breath sounds, no wheeze appreciated   Abdominal: Soft. Bowel sounds are normal. She exhibits no distension. There is no tenderness.   Neurological: She is alert and oriented to person, place, and time.   Speech intact.  Moving all 4 extremities.   Skin: Capillary refill takes more than 3 seconds. She is not diaphoretic.   Wound with overlying Mepilex left inner thigh, right upper extremity AVF - connected. R groin angio access site with dressing, small amount of bleeding underlying   Vitals reviewed.      Significant Labs:   BMP:   Recent Labs   Lab 03/18/20  0526   GLU 89   *   K 4.1   CL 96   CO2 27   BUN 33*   CREATININE 4.5*   CALCIUM 8.2*   MG 2.1     CBC:   Recent Labs   Lab 03/17/20  1209 03/18/20  0526   WBC 7.72 8.55   HGB 9.7* 10.0*   HCT 31.3* 32.9*    216     CMP:   Recent Labs   Lab 03/17/20  1209 03/18/20  0526   * 134*   K 4.0 4.1   CL 96 96   CO2 24 27   GLU 87 89   BUN 26* 33*   CREATININE 3.6* 4.5*   CALCIUM 8.4* 8.2*   PROT 7.2 7.4   ALBUMIN 2.6* 2.8*   BILITOT 0.9 1.2*   ALKPHOS 135 134   AST 26 20   ALT 27 23   ANIONGAP 15 11   EGFRNONAA 11.9* 9.1*     Cardiac Markers:   Recent Labs   Lab 03/17/20  1209   BNP >4,500*     Magnesium:   Recent Labs   Lab 03/17/20  1209 03/18/20  0526   MG 2.0 2.1     POCT Glucose: No results for input(s): POCTGLUCOSE in the last 48 hours.    Significant Imaging: I have reviewed all pertinent imaging results/findings within the past 24 hours.      Assessment/Plan:      * Peripheral vascular disease now with left lower extremity occlusion  Patient admitted with severe PVD of LLE  No Doppler flow identified within  posterior tibial, peroneal, or dorsalis pedis arteries consistent with occlusion per [facility] report  Patient was previously on Coumadin for cardiac thrombus as well as atrial fibrillation.  Coumadin currently on hold an on heparin drip as per infusion portable however INR 2.2 today  On 3/18 underwent left lower extremity angiogram by Dr. Khoobehi, long segment of calcified PT, small vessel disease, lesions were amendable to intervention due to severe calcification/size of vessel.  Heparin drip discontinued.   Renal diet reordered  Appreciate vascular surgery input    Other persistent atrial fibrillation  Known history of atrial fibrillation.  Heparin drip now discontinued. Rechecking INR in a.m..    Chronic pain on chronic narcotics  Continue Norco 7.5, low-dose IV Dilaudid.  Monitor for signs of over sedation.      Anemia  Chronic anemia.  Trending especially while on heparin drip.      Chronic combined systolic and diastolic heart failure  Previous echo with EF of 25% with grade 4 diastolic dysfunction.  Patient has tendency a pitting pulmonary edema with recurrent hospitalizations for same.      Gout  No evidence of acute flare at this time.      H/O mitral valve replacement  Patient with a history of mitral valve replacement, bioprosthetic.      Left atrial thrombus  Patient was on Coumadin at home.      HLD (hyperlipidemia)  Continue statin      Chronic respiratory failure  Chronic renal failure on 3 L home oxygen at baseline.      Pulmonary hypertension  Last echo with PASP 50.      ESRD (end stage renal disease) on HD M,W, F  ESRD on dialysis MWF via right upper extremity AVF.  Patient has tendency for getting pulmonary edema and volume overload.  BNP > 4500.  Renally dose all medications and avoid nephrotoxin drugs  Dr. Ingram following for Nephrology- dialysis today with 1 1/2 L removal    Tobacco dependence  Patient continues to smoke.  Not interested in smoking cessation counseling.      HTN  (hypertension)  Continue home medications and monitor.        VTE Risk Mitigation (From admission, onward)         Ordered     IP VTE HIGH RISK PATIENT  Once      03/14/20 8184                      Lissa Portillo MD  Department of Hospital Medicine   [facility]

## 2020-03-18 NOTE — PROGRESS NOTES
Progress Note  [facility]    Admit Date: 3/14/2020   LOS: 3 days     SUBJECTIVE:     Follow-up For:  ESRD    Patient off the floor for procedure.    OBJECTIVE:     Vital Signs (Most Recent)  Temp: 98.1 °F (36.7 °C) (03/18/20 0724)  Pulse: 77 (03/18/20 0724)  Resp: 20 (03/18/20 0724)  BP: 115/65 (03/18/20 0724)  SpO2: (!) 91 % (03/18/20 0724)    Vital Signs Range (Last 24H):  Temp:  [97.4 °F (36.3 °C)-98.1 °F (36.7 °C)]   Pulse:  []   Resp:  [18-20]   BP: (115-161)/(65-80)   SpO2:  [91 %-100 %]     I/O last 3 completed shifts:  In: 240 [P.O.:240]  Out: -     Laboratory:  Recent Labs   Lab 03/18/20  0526   HGB 10.0*   HCT 32.9*   WBC 8.55          Recent Labs   Lab 03/16/20  0455 03/16/20  0938 03/17/20  1209 03/18/20  0526   *  --  135* 134*   K 4.1  --  4.0 4.1   CL 90*  --  96 96   CO2 25  --  24 27   BUN 39*  --  26* 33*   CREATININE 4.3*  --  3.6* 4.5*   GLU 94  --  87 89   CALCIUM 8.1*  --  8.4* 8.2*   PHOS  --  4.4 3.2 3.9     ..  Lab Results   Component Value Date    INR 2.2 03/18/2020    INR 2.3 03/17/2020    INR 3.2 03/16/2020     ..  Lab Results   Component Value Date    URICACID 4.0 03/17/2020       BNP  Recent Labs   Lab 03/14/20  0548 03/17/20  1209   BNP >4,500* >4,500*     ECHO  Reading physician: Gordon Eason MD Ordering physician: Keyur Bravo MD Study date: 1/3/20   Reason for Exam   Priority: Routine   Dx: SOB (shortness of breath) [R06.02 (ICD-10-CM)]   Comments:    Conclusion     · Eccentric left ventricular hypertrophy.  · Severely decreased left ventricular systolic function. The estimated ejection fraction is 25%.  · Grade IV (severe) left ventricular diastolic dysfunction.  · Moderate right ventricular enlargement.  · Moderately reduced right ventricular systolic function.  · Severe left atrial enlargement.  · Layered left atrial thrombus suggested. The thrombus is fixed and located in the inferior cavity.  · Moderate right atrial  enlargement.  · There is a bioprosthetic mitral valve. Prosthetic mitral valve is normal.  · Moderate tricuspid regurgitation.  · Mild to moderate pulmonary hypertension present. Estimated PA systolic pressure 50 mm of Hg.  · Mild to moderate pulmonic regurgitation.  · Small posterior pericardial effusion.       Problem List:    Active Hospital Problems    Diagnosis  POA    *Peripheral vascular disease now with left lower extremity occlusion [I70.90]  Yes    H/O mitral valve replacement [Z95.2]  Not Applicable     Chronic    Gout [M10.9]  Yes     Chronic    Chronic combined systolic and diastolic heart failure [I50.42]  Yes     Chronic    Anemia [D64.9]  Yes     Chronic    Chronic pain on chronic narcotics [R52]  Yes     Chronic    Left atrial thrombus [I51.3]  Yes    HLD (hyperlipidemia) [E78.5]  Yes     Chronic    Chronic respiratory failure [J96.10]  Yes     Chronic     3 L O2 at home      Pulmonary hypertension [I27.20]  Yes     Chronic    Other persistent atrial fibrillation [I48.19]  Yes    ESRD (end stage renal disease) on HD M,W, F [N18.6]  Yes     Chronic    Tobacco dependence [F17.200]  Yes     Chronic    HTN (hypertension) [I10]  Yes     Chronic      Resolved Hospital Problems    Diagnosis Date Resolved POA    Calciphylaxis [E83.59] 03/17/2020 Yes     Chronic       Nephrology Assessment/Plan:     1. Pulmonary edema  Improved with UF/HD  Follow-up BNP in a.m.  Monitor     2. ESRD  HD MWF  UF as tolerated     3. HTN  Better with UF  Monitor     4. S/P MVR  INR improved for procedure     5. Anemia  Near ESRD goal  DONYN with HD  Monitor     6. Hypo Na+  Improved with UF  Monitor     7. Systolic and diastolic HF  Frequent episodes of pulmonary edema  Improved with ultrafiltration with dialysis    8. Left great toe pain  Uric acid okay    9. Secondary HPT  Calcium corrects for hypoalbuminemia  Phosphorus good    Joseph Ingram M.D.

## 2020-03-18 NOTE — PLAN OF CARE
Pt kept npo for run off procedure in am. Pt on heparin dripp. Checking ptt q 6 hrs. Pt educated on plan of care and safety.

## 2020-03-19 VITALS
SYSTOLIC BLOOD PRESSURE: 157 MMHG | DIASTOLIC BLOOD PRESSURE: 93 MMHG | WEIGHT: 129.38 LBS | HEIGHT: 65 IN | RESPIRATION RATE: 18 BRPM | BODY MASS INDEX: 21.56 KG/M2 | OXYGEN SATURATION: 97 % | TEMPERATURE: 98 F | HEART RATE: 79 BPM

## 2020-03-19 LAB
ALBUMIN SERPL BCP-MCNC: 2.8 G/DL (ref 3.5–5.2)
ALP SERPL-CCNC: 118 U/L (ref 55–135)
ALT SERPL W/O P-5'-P-CCNC: 19 U/L (ref 10–44)
ANION GAP SERPL CALC-SCNC: 11 MMOL/L (ref 8–16)
AST SERPL-CCNC: 18 U/L (ref 10–40)
BASOPHILS # BLD AUTO: 0.03 K/UL (ref 0–0.2)
BASOPHILS NFR BLD: 0.4 % (ref 0–1.9)
BILIRUB SERPL-MCNC: 1.2 MG/DL (ref 0.1–1)
BLD PROD TYP BPU: NORMAL
BLD PROD TYP BPU: NORMAL
BLOOD UNIT EXPIRATION DATE: NORMAL
BLOOD UNIT EXPIRATION DATE: NORMAL
BLOOD UNIT TYPE CODE: 7300
BLOOD UNIT TYPE CODE: 7300
BLOOD UNIT TYPE: NORMAL
BLOOD UNIT TYPE: NORMAL
BUN SERPL-MCNC: 24 MG/DL (ref 8–23)
CALCIUM SERPL-MCNC: 8.1 MG/DL (ref 8.7–10.5)
CHLORIDE SERPL-SCNC: 96 MMOL/L (ref 95–110)
CO2 SERPL-SCNC: 26 MMOL/L (ref 23–29)
CODING SYSTEM: NORMAL
CODING SYSTEM: NORMAL
CREAT SERPL-MCNC: 3.9 MG/DL (ref 0.5–1.4)
DIFFERENTIAL METHOD: ABNORMAL
DISPENSE STATUS: NORMAL
DISPENSE STATUS: NORMAL
EOSINOPHIL # BLD AUTO: 0.1 K/UL (ref 0–0.5)
EOSINOPHIL NFR BLD: 0.8 % (ref 0–8)
ERYTHROCYTE [DISTWIDTH] IN BLOOD BY AUTOMATED COUNT: 18.4 % (ref 11.5–14.5)
EST. GFR  (AFRICAN AMERICAN): 12.5 ML/MIN/1.73 M^2
EST. GFR  (NON AFRICAN AMERICAN): 10.8 ML/MIN/1.73 M^2
GLUCOSE SERPL-MCNC: 56 MG/DL (ref 70–110)
HCT VFR BLD AUTO: 33.5 % (ref 37–48.5)
HGB BLD-MCNC: 9.9 G/DL (ref 12–16)
IMM GRANULOCYTES # BLD AUTO: 0.07 K/UL (ref 0–0.04)
IMM GRANULOCYTES NFR BLD AUTO: 0.9 % (ref 0–0.5)
INR PPP: 2.4
LYMPHOCYTES # BLD AUTO: 0.9 K/UL (ref 1–4.8)
LYMPHOCYTES NFR BLD: 11.9 % (ref 18–48)
MAGNESIUM SERPL-MCNC: 2 MG/DL (ref 1.6–2.6)
MCH RBC QN AUTO: 25.8 PG (ref 27–31)
MCHC RBC AUTO-ENTMCNC: 29.6 G/DL (ref 32–36)
MCV RBC AUTO: 87 FL (ref 82–98)
MONOCYTES # BLD AUTO: 0.6 K/UL (ref 0.3–1)
MONOCYTES NFR BLD: 7.6 % (ref 4–15)
NEUTROPHILS # BLD AUTO: 6.2 K/UL (ref 1.8–7.7)
NEUTROPHILS NFR BLD: 78.4 % (ref 38–73)
NRBC BLD-RTO: 0 /100 WBC
NUM UNITS TRANS FFP: NORMAL
NUM UNITS TRANS FFP: NORMAL
PHOSPHATE SERPL-MCNC: 4.2 MG/DL (ref 2.7–4.5)
PLATELET # BLD AUTO: 209 K/UL (ref 150–350)
PMV BLD AUTO: 10.9 FL (ref 9.2–12.9)
POTASSIUM SERPL-SCNC: 4.8 MMOL/L (ref 3.5–5.1)
PROT SERPL-MCNC: 7.8 G/DL (ref 6–8.4)
PROTHROMBIN TIME: 24.8 SEC (ref 10.6–14.8)
RBC # BLD AUTO: 3.84 M/UL (ref 4–5.4)
SODIUM SERPL-SCNC: 133 MMOL/L (ref 136–145)
WBC # BLD AUTO: 7.91 K/UL (ref 3.9–12.7)

## 2020-03-19 PROCEDURE — 84100 ASSAY OF PHOSPHORUS: CPT

## 2020-03-19 PROCEDURE — 80053 COMPREHEN METABOLIC PANEL: CPT

## 2020-03-19 PROCEDURE — 85025 COMPLETE CBC W/AUTO DIFF WBC: CPT

## 2020-03-19 PROCEDURE — 25000003 PHARM REV CODE 250: Performed by: INTERNAL MEDICINE

## 2020-03-19 PROCEDURE — 85610 PROTHROMBIN TIME: CPT

## 2020-03-19 PROCEDURE — 83735 ASSAY OF MAGNESIUM: CPT

## 2020-03-19 PROCEDURE — 36415 COLL VENOUS BLD VENIPUNCTURE: CPT

## 2020-03-19 RX ORDER — WARFARIN 1 MG/1
2 TABLET ORAL DAILY
Qty: 30 TABLET | Refills: 1 | Status: SHIPPED | OUTPATIENT
Start: 2020-03-19 | End: 2020-06-04

## 2020-03-19 RX ORDER — OXYCODONE AND ACETAMINOPHEN 10; 325 MG/1; MG/1
TABLET ORAL
Refills: 0 | Status: ON HOLD
Start: 2020-03-19 | End: 2020-04-23 | Stop reason: HOSPADM

## 2020-03-19 RX ORDER — WARFARIN 1 MG/1
2 TABLET ORAL DAILY
Qty: 30 TABLET | Refills: 1 | Status: SHIPPED | OUTPATIENT
Start: 2020-03-19 | End: 2020-03-19 | Stop reason: SDUPTHER

## 2020-03-19 RX ADMIN — LOSARTAN POTASSIUM 25 MG: 25 TABLET, FILM COATED ORAL at 08:03

## 2020-03-19 RX ADMIN — ISOSORBIDE MONONITRATE 30 MG: 30 TABLET, EXTENDED RELEASE ORAL at 08:03

## 2020-03-19 RX ADMIN — METOPROLOL SUCCINATE 50 MG: 50 TABLET, FILM COATED, EXTENDED RELEASE ORAL at 08:03

## 2020-03-19 RX ADMIN — ASPIRIN 81 MG: 81 TABLET, DELAYED RELEASE ORAL at 08:03

## 2020-03-19 RX ADMIN — DILTIAZEM HYDROCHLORIDE 180 MG: 180 CAPSULE, COATED, EXTENDED RELEASE ORAL at 08:03

## 2020-03-19 NOTE — ASSESSMENT & PLAN NOTE
ESRD on dialysis MWF via right upper extremity AVF.  Patient has tendency for getting pulmonary edema and volume overload.  BNP > 4500.  Renally dose all medications and avoid nephrotoxin drugs  Continue outpatient hemodialysis schedule.  Outpatient nephrology follow-up.

## 2020-03-19 NOTE — PLAN OF CARE
03/19/20 1241   Final Note   Assessment Type Final Discharge Note   Anticipated Discharge Disposition Home-Health   Post-Acute Status   Post-Acute Authorization Home Health/Hospice   Home Health/Hospice Status Set-up Complete   Patient choice form signed by patient/caregiver   (Pt choice obtained at 1240 on this date for a resumption of home health with [facility]. )   Discharge Delays None known at this time

## 2020-03-19 NOTE — ASSESSMENT & PLAN NOTE
Continue home medications.  Continue to monitor for signs of over sedation.  Outpatient pain management follow-up.

## 2020-03-19 NOTE — DISCHARGE SUMMARY
[facility] Medicine  Discharge Summary      Patient Name: Griselda Neely  MRN: [id number]  Admission Date: 3/14/2020  Hospital Length of Stay: 4 days  Discharge Date and Time:  03/19/2020 1:26 PM  Attending Physician: Lissa Portillo MD   Discharging Provider: Lissa Portillo MD  Primary Care Provider: Kalie Neely MD      HPI:   74 year old patient getting admitted with LLE arterial occlusion  Patient started having pain few days ago. 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 Portillo, have assumed care of this patient on 3/17/20.  Patient admitted with left lower extremity pain, arterial ultrasound with absent posterior tibialis, dorsalis pedis and peritoneal in left lower extremity concerning for peripheral vascular disease.  Patient with a history of left atrial thrombus and atrial fibrillation on Coumadin, supratherapeutic on presentation. I confirmed home regimen prior to admission was 2.5mg daily. Coumadin was held and she was started on heparin infusion for anticoagulation.  Vascular surgery was consulted and angiogram +/- intervention was planned however INR remained elevated, discussed with Dr. Khoobehi (vascular surgery), would like INR< 2 (today INR 2.3) before proceeding with procedure, hopeful will occur tomorrow and patient is on scheduled for tomorrow.  States she has not been sleeping well Continues to report bilateral shooting leg pain.  Patient is ESRD on HD MWF.  Patient is frustrated about  "waiting for procedure.  Discussed with patient and family at bedside. On 3/18, INR 2.2, patient did undergo LLE angiogram by Dr Khoobehi, severe calcified long segment occlusion of posterior tibialis- no intervention was performed as well as small vessel disease not amenable to intervention. Initially plan was for FFP given right groin puncture site however crackles on examination and FFP deferred.  Heparin drip was discontinued.  Patient seen during dialysis with plans for 1 1/2 L fluid removal. On 3/19 seen with family present at beside, she states "I'm ready to go home." Continues with bilateral chronic LE pain, she states on oxycodone bid and tramadol as per pain management for this pain and diffuse calciphylaxis. Discussed coumadin dosing and monitoring. She has Britely and this will be resumed on discharge. Stable respiratory standpoint. Deemed stable for discharge though patient remains high risk due to multi morbidity. All questions and concerns addressed at bedside. Return precautions explained.     Discharge examination   Sitting side of bed, eating, NAD, cooperative  On supplemental O2 via NC, crackles at bases  RUE AVF with dressing pverlying  No significant LE edema  R groin angio site examined, dressing overlying, no signs of hematoma     Consults:   Consults (From admission, onward)        Status Ordering Provider     Inpatient consult to Hospitalist  Once     Provider:  Franky Maria MD    Acknowledged FRANKY MARIA     Inpatient consult to Nephrology  Once     Provider:  Joseph Ingram MD    Acknowledged FRANKY MARIA     Inpatient consult to Vascular Surgery  Once     Provider:  Joseph Loyola MD    Completed FRANKY MARIA          * Peripheral vascular disease now with left lower extremity occlusion  Patient admitted with severe PVD of LLE  No Doppler flow identified within posterior tibial, peroneal, or dorsalis pedis arteries consistent with occlusion per [facility] LLE report  Patient was " previously on Coumadin for cardiac thrombus as well as atrial fibrillation.  Coumadin was held and she was placed on heparin drip.  On 3/18 underwent left lower extremity angiogram by Dr. Khoobehi, long segment of calcified PT, small vessel disease, lesions were amendable to intervention due to severe calcification/size of vessel.  Discussed these findings with patient and family member present at bedside on day of discharge.    Other persistent atrial fibrillation  Known history of atrial fibrillation.      Chronic pain on chronic narcotics  Continue home medications.  Continue to monitor for signs of over sedation.  Outpatient pain management follow-up.      Anemia  Chronic anemia.      Chronic combined systolic and diastolic heart failure  Previous echo with EF of 25% with grade 4 diastolic dysfunction.  Patient has tendency a pitting pulmonary edema with recurrent hospitalizations for same.      Gout  No evidence of acute flare at this time.      H/O mitral valve replacement  Patient with a history of mitral valve replacement, bioprosthetic.      Left atrial thrombus  Patient on Coumadin at home.  Continue Coumadin 2.5 mg with INR checks with home health and further dosing as per INR.    HLD (hyperlipidemia)  Continue statin      Chronic respiratory failure  Chronic renal failure on 3 L home oxygen at baseline.      Pulmonary hypertension  Last echo with PASP 50.      ESRD (end stage renal disease) on HD M,W, F  ESRD on dialysis MWF via right upper extremity AVF.  Patient has tendency for getting pulmonary edema and volume overload.  BNP > 4500.  Renally dose all medications and avoid nephrotoxin drugs  Continue outpatient hemodialysis schedule.  Outpatient nephrology follow-up.    Tobacco dependence  Patient continues to smoke.  Not interested in smoking cessation counseling.      HTN (hypertension)  Continue home medications and monitor.      Calciphylaxis-resolved as of 3/17/2020  Chronic ongoing issue in the  background of end-stage renal disease  Pain medications p.r.n. Basis-on low-dose oxycodone/acetaminophen and tramadol      Final Active Diagnoses:    Diagnosis Date Noted POA    PRINCIPAL PROBLEM:  Peripheral vascular disease now with left lower extremity occlusion [I70.90] 03/14/2020 Yes    Other persistent atrial fibrillation [I48.19] 07/18/2017 Yes    H/O mitral valve replacement [Z95.2] 03/17/2020 Not Applicable     Chronic    Gout [M10.9] 03/17/2020 Yes     Chronic    Chronic combined systolic and diastolic heart failure [I50.42] 03/17/2020 Yes     Chronic    Anemia [D64.9] 03/17/2020 Yes     Chronic    Chronic pain on chronic narcotics [R52] 03/17/2020 Yes     Chronic    Left atrial thrombus [I51.3] 01/03/2020 Yes    HLD (hyperlipidemia) [E78.5] 12/20/2019 Yes     Chronic    Chronic respiratory failure [J96.10] 05/13/2019 Yes     Chronic    Pulmonary hypertension [I27.20] 07/25/2017 Yes     Chronic    ESRD (end stage renal disease) on HD M,W, F [N18.6] 05/11/2016 Yes     Chronic    Tobacco dependence [F17.200] 09/09/2013 Yes     Chronic    HTN (hypertension) [I10] 08/15/2013 Yes     Chronic      Problems Resolved During this Admission:    Diagnosis Date Noted Date Resolved POA    Calciphylaxis [E83.59] 07/18/2017 03/17/2020 Yes     Chronic       Discharged Condition: fair    Disposition: Home-Health Care [facility]    Follow Up:   Contact information for follow-up providers     Kalie Neely MD. Schedule an appointment as soon as possible for a visit in 2 weeks.    Specialty:  Family Medicine  Why:  post hospital follow up  Contact information:  [address] [facility]  [address] [place] [id number]  [phone]                   Contact information for after-discharge care     Home Medical Care     [facility] .    Service:  Home Health Services  Contact information:  [facility] [address] [phone]                           Patient Instructions:       Protime-INR   Standing Status: Future Standing Exp. Date: 04/19/20   Scheduling Instructions: On coumadin     Diet renal     Activity as tolerated       Significant Diagnostic Studies: Labs:   BMP:   Recent Labs   Lab 03/18/20  0526 03/19/20  0546   GLU 89 56*   * 133*   K 4.1 4.8   CL 96 96   CO2 27 26   BUN 33* 24*   CREATININE 4.5* 3.9*   CALCIUM 8.2* 8.1*   MG 2.1 2.0   , CMP   Recent Labs   Lab 03/18/20  0526 03/19/20  0546   * 133*   K 4.1 4.8   CL 96 96   CO2 27 26   GLU 89 56*   BUN 33* 24*   CREATININE 4.5* 3.9*   CALCIUM 8.2* 8.1*   PROT 7.4 7.8   ALBUMIN 2.8* 2.8*   BILITOT 1.2* 1.2*   ALKPHOS 134 118   AST 20 18   ALT 23 19   ANIONGAP 11 11   ESTGFRAFRICA 10.5* 12.5*   EGFRNONAA 9.1* 10.8*   , CBC   Recent Labs   Lab 03/18/20  0526 03/19/20  0546   WBC 8.55 7.91   HGB 10.0* 9.9*   HCT 32.9* 33.5*    209   , INR   Lab Results   Component Value Date    INR 2.4 03/19/2020    INR 2.2 03/18/2020    INR 2.3 03/17/2020   , Lipid Panel   Lab Results   Component Value Date    CHOL 155 10/19/2019    HDL 56 10/19/2019    LDLCALC 82.6 10/19/2019    TRIG 82 10/19/2019    CHOLHDL 36.1 10/19/2019   , Troponin   Recent Labs   Lab 03/14/20  0548   TROPONINI 0.056*    and A1C:   Recent Labs   Lab 10/19/19  0509   HGBA1C 5.0       Pending Diagnostic Studies:     Procedure Component Value Units Date/Time    EKG 12-LEAD on arrival to floor [[id number]]     Order Status:  Sent Lab Status:  No result        X-ray Femur Ap/lat Left    Result Date: 3/14/2020  EXAMINATION: XR FEMUR 2 VIEW LEFT CLINICAL HISTORY: Pain, unspecified COMPARISON: None available FINDINGS: No acute fracture or malalignment of the left femur.  Osteopenia.  Vascular calcifications noted.  Mild osteoarthrosis of the left hip.     No acute osseous abnormality. Electronically signed by: Harish Liu MD Date:    03/14/2020 Time:    07:36    X-ray Tibia Fibula 2 View Left    Result Date: 3/14/2020  EXAMINATION: XR TIBIA FIBULA 2 VIEW LEFT  CLINICAL HISTORY: Unspecified fall, initial encounter COMPARISON: None available FINDINGS: No acute fracture or malalignment of the tibia or fibula.  Osteopenia.  Femoral tibial chondrocalcinosis.  Vascular calcifications noted.     No acute osseous abnormality. Electronically signed by: Harish Liu MD Date:    03/14/2020 Time:    07:36    X-ray Foot Complete Left    Result Date: 3/14/2020  EXAMINATION: XR FOOT COMPLETE 3 VIEW LEFT CLINICAL HISTORY: Unspecified fall, initial encounter COMPARISON: None available FINDINGS: No acute fracture or malalignment of the left foot.  Joint spaces are maintained.  Vascular calcifications noted.  Enthesopathy of the calcaneus.     No acute osseous abnormality. Electronically signed by: Harish Liu MD Date:    03/14/2020 Time:    07:38    Us Lower Extremity Arteries Left    Result Date: 3/14/2020  EXAMINATION: US LOWER EXTREMITY ARTERIES LEFT CLINICAL HISTORY: Pain in leg, unspecified TECHNIQUE: Grayscale, color Doppler, and spectral Doppler evaluation of left lower extremity arterial system was performed. COMPARISON: None FINDINGS: No arterial flow identified within the posterior tibial, peroneal, and dorsalis pedis arteries. Common femoral artery, profunda femoris artery, superficial femoral artery, popliteal artery, and anterior tibial artery are patent.  No markedly elevated peak systolic velocities, although scattered atherosclerotic plaque is evident.  Tardus parvus waveform noted within the left anterior tibial artery.     No Doppler flow identified within posterior tibial, peroneal, or dorsalis pedis arteries consistent with occlusion. ANETTE demonstrates parvus tardus waveform, consistent with significant upstream stenosis. Doppler findings of peripheral vascular disease. Electronically signed by: Harish Liu MD Date:    03/14/2020 Time:    10:48    X-ray Chest Ap Portable    Result Date: 3/14/2020  EXAMINATION: XR CHEST AP PORTABLE CLINICAL HISTORY: Shortness  of Breath; COMPARISON: 02/03/2020 FINDINGS: Enlarged cardiopericardial silhouette is stable compared to prior.  Atherosclerotic calcification of the aorta.  Prosthetic aortic valve.  Small right-sided pleural effusion is stable.  Mildly increased interstitial markings within the right lung.  Left posterior rib fractures which appear subacute or chronic.  Osteopenia.     Small right pleural effusion and findings of mild asymmetric interstitial edema. Stable cardiomegaly. Electronically signed by: Harish Liu MD Date:    03/14/2020 Time:    07:34    Medications:  Reconciled Home Medications:      Medication List      START taking these medications    warfarin 1 MG tablet  Commonly known as:  COUMADIN  Take 2 tablets (2 mg total) by mouth Daily.        CHANGE how you take these medications    oxyCODONE-acetaminophen  mg per tablet  Commonly known as:  PERCOCET  Patient states taking 5-325mg bid at home. To continue  What changed:    · how much to take  · how to take this  · when to take this  · reasons to take this  · additional instructions        CONTINUE taking these medications    aspirin 81 MG EC tablet  Commonly known as:  ECOTRIN  Take 81 mg by mouth once daily.     calcium acetate(phosphat bind) 667 mg capsule  Commonly known as:  PHOSLO  Take 667 mg by mouth.     diltiaZEM 180 MG 24 hr capsule  Commonly known as:  CARDIZEM CD  Take 180 mg by mouth once daily.     isosorbide mononitrate 30 MG 24 hr tablet  Commonly known as:  IMDUR  Take 30 mg by mouth once daily.     losartan 25 MG tablet  Commonly known as:  COZAAR  Take 25 mg by mouth once daily.     magnesium oxide 400 mg (241.3 mg magnesium) tablet  Commonly known as:  MAG-OX  Take 400 mg by mouth once daily.     metoprolol succinate 50 MG 24 hr tablet  Commonly known as:  TOPROL-XL  Take 1 tablet (50 mg total) by mouth once daily.     mupirocin 2 % ointment  Commonly known as:  BACTROBAN  Apply topically 3 (three) times daily.     ondansetron 4  MG Tbdl  Commonly known as:  ZOFRAN-ODT  Take 1 tablet (4 mg total) by mouth every 6 (six) hours as needed.     traMADoL 50 mg tablet  Commonly known as:  ULTRAM  Take 1 tablet (50 mg total) by mouth 3 (three) times a week. 3 TIMES A WEEK AS NEEDED DURING HEMODIALYSIS        STOP taking these medications    HYDROcodone-acetaminophen 7.5-325 mg per tablet  Commonly known as:  NORCO            Indwelling Lines/Drains at time of discharge:   Lines/Drains/Airways     Drain                 Hemodialysis AV Fistula 08/08/19 0214 Right upper arm 224 days                Time spent on the discharge of patient: 38 minutes  Patient was seen and examined on the date of discharge and determined to be suitable for discharge.         Lissa Portillo MD  Department of Hospital Medicine  [facility]

## 2020-03-19 NOTE — DISCHARGE INSTRUCTIONS
Take Coumadin 2 mg every evening.  Home health to recheck INR/Coumadin level on coming Monday 3/23 and dose of Coumadin to be adjusted as needed.

## 2020-03-19 NOTE — ASSESSMENT & PLAN NOTE
Chronic ongoing issue in the background of end-stage renal disease  Pain medications p.r.n. Basis-on low-dose oxycodone/acetaminophen and tramadol

## 2020-03-19 NOTE — PT/OT/SLP PROGRESS
Physical Therapy      Patient Name:  Griselda Neely   MRN:  [id number]    Patient not seen today secondary to Patient unwilling to participate. Will follow-up 03/20/20.    Jackie Toro, PT

## 2020-03-19 NOTE — ASSESSMENT & PLAN NOTE
Patient on Coumadin at home.  Continue Coumadin 2.5 mg with INR checks with home health and further dosing as per INR.

## 2020-03-20 ENCOUNTER — TELEPHONE (OUTPATIENT)
Dept: FAMILY MEDICINE | Facility: CLINIC | Age: 75
End: 2020-03-20

## 2020-04-02 ENCOUNTER — TELEPHONE (OUTPATIENT)
Dept: HOME HEALTH SERVICES | Facility: HOSPITAL | Age: 75
End: 2020-04-02

## 2020-04-12 ENCOUNTER — HOSPITAL ENCOUNTER (INPATIENT)
Facility: HOSPITAL | Age: 75
LOS: 3 days | Discharge: HOME HEALTH | DRG: 291 | End: 2020-04-16
Attending: EMERGENCY MEDICINE | Admitting: INTERNAL MEDICINE
Payer: MEDICARE

## 2020-04-12 DIAGNOSIS — R06.02 SHORTNESS OF BREATH: ICD-10-CM

## 2020-04-12 DIAGNOSIS — Z20.822 SUSPECTED COVID-19 VIRUS INFECTION: ICD-10-CM

## 2020-04-12 DIAGNOSIS — I48.91 ATRIAL FIBRILLATION WITH RVR: Primary | ICD-10-CM

## 2020-04-12 DIAGNOSIS — J96.21 ACUTE ON CHRONIC RESPIRATORY FAILURE WITH HYPOXIA: ICD-10-CM

## 2020-04-12 DIAGNOSIS — R07.9 CHEST PAIN: ICD-10-CM

## 2020-04-12 PROBLEM — I50.43 ACUTE ON CHRONIC COMBINED SYSTOLIC AND DIASTOLIC HEART FAILURE: Status: ACTIVE | Noted: 2020-03-17

## 2020-04-12 LAB
ALBUMIN SERPL BCP-MCNC: 3.2 G/DL (ref 3.5–5.2)
ALLENS TEST: ABNORMAL
ALLENS TEST: ABNORMAL
ALP SERPL-CCNC: 139 U/L (ref 55–135)
ALT SERPL W/O P-5'-P-CCNC: 10 U/L (ref 10–44)
AMMONIA PLAS-SCNC: 24 UMOL/L (ref 10–50)
ANION GAP SERPL CALC-SCNC: 16 MMOL/L (ref 8–16)
AST SERPL-CCNC: 16 U/L (ref 10–40)
BASOPHILS # BLD AUTO: 0.03 K/UL (ref 0–0.2)
BASOPHILS NFR BLD: 0.6 % (ref 0–1.9)
BILIRUB SERPL-MCNC: 0.9 MG/DL (ref 0.1–1)
BNP SERPL-MCNC: >4500 PG/ML (ref 0–99)
BUN SERPL-MCNC: 41 MG/DL (ref 8–23)
CALCIUM SERPL-MCNC: 8.6 MG/DL (ref 8.7–10.5)
CHLORIDE SERPL-SCNC: 90 MMOL/L (ref 95–110)
CK SERPL-CCNC: 35 U/L (ref 20–180)
CO2 SERPL-SCNC: 27 MMOL/L (ref 23–29)
CREAT SERPL-MCNC: 6.3 MG/DL (ref 0.5–1.4)
CRP SERPL-MCNC: 4.25 MG/DL (ref 0–0.75)
DELSYS: ABNORMAL
DELSYS: ABNORMAL
DIFFERENTIAL METHOD: ABNORMAL
EOSINOPHIL # BLD AUTO: 0.1 K/UL (ref 0–0.5)
EOSINOPHIL NFR BLD: 1.5 % (ref 0–8)
ERYTHROCYTE [DISTWIDTH] IN BLOOD BY AUTOMATED COUNT: 21.1 % (ref 11.5–14.5)
EST. GFR  (AFRICAN AMERICAN): 7 ML/MIN/1.73 M^2
EST. GFR  (NON AFRICAN AMERICAN): 6.1 ML/MIN/1.73 M^2
FERRITIN SERPL-MCNC: 877 NG/ML (ref 20–300)
FLOW: 2
GLUCOSE SERPL-MCNC: 83 MG/DL (ref 70–110)
HCO3 UR-SCNC: 26.3 MMOL/L (ref 24–28)
HCO3 UR-SCNC: 31.8 MMOL/L (ref 24–28)
HCT VFR BLD AUTO: 38.7 % (ref 37–48.5)
HGB BLD-MCNC: 11.4 G/DL (ref 12–16)
IMM GRANULOCYTES # BLD AUTO: 0.03 K/UL (ref 0–0.04)
IMM GRANULOCYTES NFR BLD AUTO: 0.6 % (ref 0–0.5)
INR PPP: 1.7
LACTATE SERPL-SCNC: 2 MMOL/L (ref 0.5–1.9)
LDH SERPL L TO P-CCNC: 195 U/L (ref 110–260)
LYMPHOCYTES # BLD AUTO: 0.8 K/UL (ref 1–4.8)
LYMPHOCYTES NFR BLD: 14.2 % (ref 18–48)
MAGNESIUM SERPL-MCNC: 2.2 MG/DL (ref 1.6–2.6)
MCH RBC QN AUTO: 26.1 PG (ref 27–31)
MCHC RBC AUTO-ENTMCNC: 29.5 G/DL (ref 32–36)
MCV RBC AUTO: 89 FL (ref 82–98)
MODE: ABNORMAL
MONOCYTES # BLD AUTO: 0.6 K/UL (ref 0.3–1)
MONOCYTES NFR BLD: 10.7 % (ref 4–15)
NEUTROPHILS # BLD AUTO: 3.9 K/UL (ref 1.8–7.7)
NEUTROPHILS NFR BLD: 72.4 % (ref 38–73)
NRBC BLD-RTO: 0 /100 WBC
PCO2 BLDA: 43.6 MMHG (ref 35–45)
PCO2 BLDA: 59.4 MMHG (ref 35–45)
PH SMN: 7.34 [PH] (ref 7.35–7.45)
PH SMN: 7.39 [PH] (ref 7.35–7.45)
PLATELET # BLD AUTO: 228 K/UL (ref 150–350)
PMV BLD AUTO: 10.8 FL (ref 9.2–12.9)
PO2 BLDA: 14 MMHG (ref 40–60)
PO2 BLDA: 142 MMHG (ref 80–100)
POC BE: 1 MMOL/L
POC BE: 6 MMOL/L
POC SATURATED O2: 15 % (ref 95–100)
POC SATURATED O2: 99 % (ref 95–100)
POC TCO2: 28 MMOL/L (ref 23–27)
POC TCO2: 34 MMOL/L (ref 24–29)
POTASSIUM SERPL-SCNC: 5.2 MMOL/L (ref 3.5–5.1)
PROCALCITONIN SERPL IA-MCNC: 0.42 NG/ML (ref 0–0.5)
PROT SERPL-MCNC: 8.9 G/DL (ref 6–8.4)
PROTHROMBIN TIME: 19.6 SEC (ref 10.6–14.8)
RBC # BLD AUTO: 4.37 M/UL (ref 4–5.4)
SAMPLE: ABNORMAL
SAMPLE: ABNORMAL
SARS-COV-2 RDRP RESP QL NAA+PROBE: NEGATIVE
SITE: ABNORMAL
SITE: ABNORMAL
SODIUM SERPL-SCNC: 133 MMOL/L (ref 136–145)
T4 FREE SERPL-MCNC: 0.9 NG/DL (ref 0.71–1.51)
TROPONIN I SERPL DL<=0.01 NG/ML-MCNC: 0.03 NG/ML
TSH SERPL DL<=0.005 MIU/L-ACNC: 5.91 UIU/ML (ref 0.34–5.6)
WBC # BLD AUTO: 5.43 K/UL (ref 3.9–12.7)

## 2020-04-12 PROCEDURE — 86140 C-REACTIVE PROTEIN: CPT

## 2020-04-12 PROCEDURE — 84443 ASSAY THYROID STIM HORMONE: CPT

## 2020-04-12 PROCEDURE — G0378 HOSPITAL OBSERVATION PER HR: HCPCS

## 2020-04-12 PROCEDURE — 82803 BLOOD GASES ANY COMBINATION: CPT

## 2020-04-12 PROCEDURE — 82728 ASSAY OF FERRITIN: CPT

## 2020-04-12 PROCEDURE — 83605 ASSAY OF LACTIC ACID: CPT

## 2020-04-12 PROCEDURE — 83615 LACTATE (LD) (LDH) ENZYME: CPT

## 2020-04-12 PROCEDURE — 36600 WITHDRAWAL OF ARTERIAL BLOOD: CPT

## 2020-04-12 PROCEDURE — 94640 AIRWAY INHALATION TREATMENT: CPT

## 2020-04-12 PROCEDURE — 85610 PROTHROMBIN TIME: CPT

## 2020-04-12 PROCEDURE — 84439 ASSAY OF FREE THYROXINE: CPT

## 2020-04-12 PROCEDURE — 84145 PROCALCITONIN (PCT): CPT

## 2020-04-12 PROCEDURE — 36415 COLL VENOUS BLD VENIPUNCTURE: CPT

## 2020-04-12 PROCEDURE — 25000003 PHARM REV CODE 250: Performed by: EMERGENCY MEDICINE

## 2020-04-12 PROCEDURE — 99285 EMERGENCY DEPT VISIT HI MDM: CPT | Mod: 25

## 2020-04-12 PROCEDURE — 84484 ASSAY OF TROPONIN QUANT: CPT

## 2020-04-12 PROCEDURE — 96374 THER/PROPH/DIAG INJ IV PUSH: CPT

## 2020-04-12 PROCEDURE — 63600175 PHARM REV CODE 636 W HCPCS: Performed by: EMERGENCY MEDICINE

## 2020-04-12 PROCEDURE — 25000003 PHARM REV CODE 250: Performed by: NURSE PRACTITIONER

## 2020-04-12 PROCEDURE — 99900035 HC TECH TIME PER 15 MIN (STAT)

## 2020-04-12 PROCEDURE — 85025 COMPLETE CBC W/AUTO DIFF WBC: CPT

## 2020-04-12 PROCEDURE — 83735 ASSAY OF MAGNESIUM: CPT

## 2020-04-12 PROCEDURE — 83880 ASSAY OF NATRIURETIC PEPTIDE: CPT

## 2020-04-12 PROCEDURE — 87040 BLOOD CULTURE FOR BACTERIA: CPT

## 2020-04-12 PROCEDURE — 82140 ASSAY OF AMMONIA: CPT

## 2020-04-12 PROCEDURE — 82550 ASSAY OF CK (CPK): CPT

## 2020-04-12 PROCEDURE — 80053 COMPREHEN METABOLIC PANEL: CPT

## 2020-04-12 PROCEDURE — 93005 ELECTROCARDIOGRAM TRACING: CPT | Performed by: INTERNAL MEDICINE

## 2020-04-12 PROCEDURE — U0002 COVID-19 LAB TEST NON-CDC: HCPCS

## 2020-04-12 RX ORDER — DILTIAZEM HYDROCHLORIDE 5 MG/ML
5 INJECTION INTRAVENOUS
Status: COMPLETED | OUTPATIENT
Start: 2020-04-12 | End: 2020-04-12

## 2020-04-12 RX ORDER — SODIUM CHLORIDE 0.9 % (FLUSH) 0.9 %
10 SYRINGE (ML) INJECTION
Status: DISCONTINUED | OUTPATIENT
Start: 2020-04-12 | End: 2020-04-16 | Stop reason: HOSPADM

## 2020-04-12 RX ORDER — METOPROLOL SUCCINATE 50 MG/1
50 TABLET, EXTENDED RELEASE ORAL DAILY
Status: DISCONTINUED | OUTPATIENT
Start: 2020-04-13 | End: 2020-04-16 | Stop reason: HOSPADM

## 2020-04-12 RX ORDER — WARFARIN 1 MG/1
2 TABLET ORAL DAILY
Status: DISCONTINUED | OUTPATIENT
Start: 2020-04-13 | End: 2020-04-16 | Stop reason: HOSPADM

## 2020-04-12 RX ORDER — ISOSORBIDE MONONITRATE 30 MG/1
30 TABLET, EXTENDED RELEASE ORAL DAILY
Status: DISCONTINUED | OUTPATIENT
Start: 2020-04-13 | End: 2020-04-16 | Stop reason: HOSPADM

## 2020-04-12 RX ORDER — DILTIAZEM HCL 1 MG/ML
5 INJECTION, SOLUTION INTRAVENOUS CONTINUOUS
Status: DISCONTINUED | OUTPATIENT
Start: 2020-04-12 | End: 2020-04-16 | Stop reason: HOSPADM

## 2020-04-12 RX ORDER — OXYCODONE AND ACETAMINOPHEN 10; 325 MG/1; MG/1
1 TABLET ORAL EVERY 6 HOURS PRN
Status: DISCONTINUED | OUTPATIENT
Start: 2020-04-12 | End: 2020-04-14

## 2020-04-12 RX ORDER — CALCIUM ACETATE 667 MG/1
667 CAPSULE ORAL
Status: DISCONTINUED | OUTPATIENT
Start: 2020-04-13 | End: 2020-04-16 | Stop reason: HOSPADM

## 2020-04-12 RX ORDER — FUROSEMIDE 10 MG/ML
20 INJECTION INTRAMUSCULAR; INTRAVENOUS
Status: COMPLETED | OUTPATIENT
Start: 2020-04-12 | End: 2020-04-12

## 2020-04-12 RX ORDER — DILTIAZEM HYDROCHLORIDE 180 MG/1
180 CAPSULE, COATED, EXTENDED RELEASE ORAL DAILY
Status: DISCONTINUED | OUTPATIENT
Start: 2020-04-13 | End: 2020-04-16 | Stop reason: HOSPADM

## 2020-04-12 RX ORDER — ONDANSETRON 2 MG/ML
4 INJECTION INTRAMUSCULAR; INTRAVENOUS EVERY 8 HOURS PRN
Status: DISCONTINUED | OUTPATIENT
Start: 2020-04-12 | End: 2020-04-16 | Stop reason: HOSPADM

## 2020-04-12 RX ORDER — LANOLIN ALCOHOL/MO/W.PET/CERES
400 CREAM (GRAM) TOPICAL DAILY
Status: DISCONTINUED | OUTPATIENT
Start: 2020-04-13 | End: 2020-04-16 | Stop reason: HOSPADM

## 2020-04-12 RX ORDER — LEVALBUTEROL 1.25 MG/.5ML
1.25 SOLUTION, CONCENTRATE RESPIRATORY (INHALATION)
Status: ACTIVE | OUTPATIENT
Start: 2020-04-12 | End: 2020-04-13

## 2020-04-12 RX ORDER — LEVALBUTEROL INHALATION SOLUTION 0.63 MG/3ML
0.63 SOLUTION RESPIRATORY (INHALATION)
Status: DISCONTINUED | OUTPATIENT
Start: 2020-04-13 | End: 2020-04-14

## 2020-04-12 RX ORDER — ACETAMINOPHEN 325 MG/1
650 TABLET ORAL EVERY 8 HOURS PRN
Status: DISCONTINUED | OUTPATIENT
Start: 2020-04-12 | End: 2020-04-16 | Stop reason: HOSPADM

## 2020-04-12 RX ORDER — LOSARTAN POTASSIUM 25 MG/1
25 TABLET ORAL DAILY
Status: DISCONTINUED | OUTPATIENT
Start: 2020-04-13 | End: 2020-04-16 | Stop reason: HOSPADM

## 2020-04-12 RX ORDER — ASPIRIN 81 MG/1
81 TABLET ORAL DAILY
Status: DISCONTINUED | OUTPATIENT
Start: 2020-04-13 | End: 2020-04-16 | Stop reason: HOSPADM

## 2020-04-12 RX ADMIN — DILTIAZEM HYDROCHLORIDE 5 MG: 5 INJECTION INTRAVENOUS at 09:04

## 2020-04-12 RX ADMIN — DILTIAZEM HYDROCHLORIDE 5 MG/HR: 5 INJECTION INTRAVENOUS at 09:04

## 2020-04-12 RX ADMIN — FUROSEMIDE 20 MG: 10 INJECTION, SOLUTION INTRAMUSCULAR; INTRAVENOUS at 08:04

## 2020-04-12 NOTE — ED PROVIDER NOTES
Encounter Date: 4/12/2020       History   The patient is a 74-year-old female with history of CHF, end-stage renal disease - on HD, pneumonia, peripheral vascular disease, atrial fibrillation- on warfarin, hypertension, hyperlipidemia.   Presents to the ED with cough and shortness of breath. She lives with her sister, Aide. Per her sister, Elvi Hi at [phone], she has COPD and on home O2 at 3 L NC. She continues to smoke. She has been confused and more shortness of breath than usual. 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  Her other sister usually prepares her medication but not certain whether the patient has taken them today. Her sister states that the patient has chronic cough which does not appear to be worsen.      HPI  Review of patient's allergies indicates:  No Known Allergies  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    both legs    CHF (congestive heart failure)     Encounter for blood transfusion 03/2016    Gout     Hypertension     Mitral valve regurgitation     Osteoarthritis     Pancreatitis     Peripheral vascular disease     Peritoneal dialysis catheter in place     Pneumonia 09/09/2017    Renal failure      Past Surgical History:   Procedure Laterality Date    ACHILLES TENDON SURGERY Right     ANGIOGRAPHY OF ARTERIOVENOUS SHUNT Right 1/7/2020    Procedure: Fistulogram with Possible Intervention;  Surgeon: Joseph Loyola MD;  [facility] CATH/EP LAB;  Service: Cardiology;  Laterality: Right;    ANGIOGRAPHY OF LOWER EXTREMITY Left 3/18/2020    Procedure: Angiogram Extremity Unilateral;  Surgeon: Ali Khoobehi, MD;  Location: [facility] CATH/EP LAB;  Service: Cardiology;  Laterality: Left;    APPENDECTOMY      CARDIAC CATHETERIZATION  07/03/2017    CHOLECYSTECTOMY      heal surgery Right     HYSTERECTOMY      INSERTION OF STENT INTO PERIPHERAL VESSEL N/A 1/7/2020    Procedure: INSERTION, STENT, VESSEL,  PERIPHERAL;  Surgeon: Joseph Loyola MD;  Location: [facility] CATH/EP LAB;  Service: Cardiology;  Laterality: N/A;    PARATHYROIDECTOMY      PARATHYROIDECTOMY  07/13/2017    PERCUTANEOUS TRANSLUMINAL ANGIOPLASTY OF ARTERIOVENOUS FISTULA N/A 1/7/2020    Procedure: PTA, AV FISTULA;  Surgeon: Joseph Loyola MD;  Location: [facility] CATH/EP LAB;  Service: Cardiology;  Laterality: N/A;    PERITONEAL CATHETER INSERTION      RENAL BIOPSY      vocal cord nodule       Family History   Problem Relation Age of Onset    Heart disease Sister     Early death Sister         heart as baby    Heart disease Maternal Grandfather     Diabetes Mother     Hypertension Mother     Heart failure Mother     Heart disease Mother     Arthritis Mother     Diabetes Father     Early death Sister         infant     Social History     Tobacco Use    Smoking status: Current Some Day Smoker     Packs/day: 0.50     Years: 45.00     Pack years: 22.50     Types: Cigarettes    Smokeless tobacco: Never Used   Substance Use Topics    Alcohol use: No    Drug use: No     Review of Systems   Constitutional: Positive for fatigue. Negative for fever.   HENT: Negative for sore throat.    Respiratory: Positive for shortness of breath.    Cardiovascular: Negative for chest pain.   Gastrointestinal: Negative for abdominal pain, diarrhea and nausea.   Genitourinary: Negative for dysuria.   Musculoskeletal: Positive for myalgias. Negative for back pain.   Skin: Negative for rash.   Neurological: Negative for weakness.        Confusion   Hematological: Does not bruise/bleed easily.   Psychiatric/Behavioral: Positive for confusion.       Physical Exam     Initial Vitals   BP Pulse Resp Temp SpO2   -- -- -- -- --      MAP       --         Physical Exam    Nursing note and vitals reviewed.  Constitutional: She is not diaphoretic.   Positive conversational dyspnea, chronically elderly appearing female   HENT:   Head: Normocephalic and atraumatic.   Right  Ear: External ear normal.   Left Ear: External ear normal.   Eyes: Conjunctivae and EOM are normal. Pupils are equal, round, and reactive to light. Right eye exhibits no discharge. Left eye exhibits no discharge.   Neck: Normal range of motion. Neck supple. No tracheal deviation present. No JVD present.   Cardiovascular: Regular rhythm.   Murmur (3/6 murmur heard at left lower sternal border) heard.  Irregular irregular   Pulmonary/Chest: No stridor.   Course bilateral breath sounds   Abdominal: Bowel sounds are normal. She exhibits no distension. There is no tenderness. There is no rebound and no guarding.   Musculoskeletal: She exhibits tenderness (Noted to bilateral lower extremity). She exhibits no edema.   Chronic venous disease noted to bilateral lower extremities.  Cap refilled 2-3 sec   Neurological: She is oriented to person, place, and time. She displays normal reflexes. No cranial nerve deficit or sensory deficit.   Skin: Skin is warm and dry. Capillary refill takes 2 to 3 seconds. No rash noted. No erythema.   Psychiatric:   Flat affect         ED Course   Procedures  Labs Reviewed - No data to display       Imaging Results    None          Medical Decision Making:   Initial Assessment:   74-year-old with history of congestive heart failure, hypertension, mitral valve regurgitation, peripheral vascular disease, chronic dialysis here with complaint of shortness of breath  Differential Diagnosis:   Volume overload, pulmonary edema, electrolyte abnormality, congestive heart failure, corona virus infection  Clinical Tests:   Lab Tests: Ordered and Reviewed  Radiological Study: Ordered and Reviewed  Medical Tests: Ordered and Reviewed  Sepsis Perfusion Assessment: I attest, a sepsis perfusion exam was performed within 6 hours of Septic Shock presentation, following fluid resuscitation.                                 Clinical Impression:       ICD-10-CM ICD-9-CM   1. Atrial fibrillation with RVR I48.91 427.31    2. Suspected Covid-19 Virus Infection R68.89    3. Shortness of breath R06.02 786.05   4. Chest pain R07.9 786.50   5. Acute on chronic respiratory failure with hypoxia J96.21 518.84     799.02                                Daren Ramirez MD  04/21/20 0753       Daren Ramirez MD  04/21/20 0759

## 2020-04-12 NOTE — ED PROVIDER NOTES
Encounter Date: 4/12/2020       History     Chief Complaint   Patient presents with    Shortness of Breath     74-year-old female presents complaining of cough and shortness of breath, patient has a history of CHF, end-stage renal disease, pneumonia, peripheral vascular disease, hypertension, hyperlipidemia.  Family reports that patient has been having progressive shortness of breath for the past week worse today, patient denies fever patient reports cough productive of clear sputum, patient denies chest pain abdominal pain or any other acute complaints at this time.        Review of patient's allergies indicates:  No Known Allergies  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    both legs    CHF (congestive heart failure)     Encounter for blood transfusion 03/2016    Gout     Hypertension     Mitral valve regurgitation     Osteoarthritis     Pancreatitis     Peripheral vascular disease     Peritoneal dialysis catheter in place     Pneumonia 09/09/2017    Renal failure      Past Surgical History:   Procedure Laterality Date    ACHILLES TENDON SURGERY Right     ANGIOGRAPHY OF ARTERIOVENOUS SHUNT Right 1/7/2020    Procedure: Fistulogram with Possible Intervention;  Surgeon: Joseph Loyola MD;  Location: [facility] CATH/EP LAB;  Service: Cardiology;  Laterality: Right;    ANGIOGRAPHY OF LOWER EXTREMITY Left 3/18/2020    Procedure: Angiogram Extremity Unilateral;  Surgeon: Ali Khoobehi, MD;  Location: [facility] CATH/EP LAB;  Service: Cardiology;  Laterality: Left;    APPENDECTOMY      CARDIAC CATHETERIZATION  07/03/2017    CHOLECYSTECTOMY      heal surgery Right     HYSTERECTOMY      INSERTION OF STENT INTO PERIPHERAL VESSEL N/A 1/7/2020    Procedure: INSERTION, STENT, VESSEL, PERIPHERAL;  Surgeon: Joseph Loyola MD;  Location: [facility] CATH/EP LAB;  Service: Cardiology;  Laterality: N/A;    PARATHYROIDECTOMY      PARATHYROIDECTOMY  07/13/2017    PERCUTANEOUS TRANSLUMINAL  ANGIOPLASTY OF ARTERIOVENOUS FISTULA N/A 1/7/2020    Procedure: PTA, AV FISTULA;  Surgeon: Joseph Lyoola MD;  Location: [facility] CATH/EP LAB;  Service: Cardiology;  Laterality: N/A;    PERITONEAL CATHETER INSERTION      RENAL BIOPSY      vocal cord nodule       Family History   Problem Relation Age of Onset    Heart disease Sister     Early death Sister         heart as baby    Heart disease Maternal Grandfather     Diabetes Mother     Hypertension Mother     Heart failure Mother     Heart disease Mother     Arthritis Mother     Diabetes Father     Early death Sister         infant     Social History     Tobacco Use    Smoking status: Current Some Day Smoker     Packs/day: 0.50     Years: 45.00     Pack years: 22.50     Types: Cigarettes    Smokeless tobacco: Never Used   Substance Use Topics    Alcohol use: No    Drug use: No     Review of Systems   Constitutional: Negative for fever.   HENT: Negative for congestion, rhinorrhea, sore throat and trouble swallowing.    Eyes: Negative for visual disturbance.   Respiratory: Positive for cough and shortness of breath. Negative for chest tightness and wheezing.    Cardiovascular: Negative for chest pain, palpitations and leg swelling.   Gastrointestinal: Negative for abdominal distention, abdominal pain, constipation, diarrhea, nausea and vomiting.   Genitourinary: Negative for difficulty urinating, dysuria, flank pain and frequency.   Musculoskeletal: Negative for arthralgias, back pain, joint swelling and neck pain.   Skin: Negative for color change and rash.   Neurological: Negative for dizziness, syncope, speech difficulty, weakness, numbness and headaches.   All other systems reviewed and are negative.      Physical Exam     Initial Vitals   BP Pulse Resp Temp SpO2   04/12/20 1658 04/12/20 1700 04/12/20 1746 04/12/20 1949 04/12/20 1700   (!) 152/98 (!) 117 (!) 22 98.3 °F (36.8 °C) 100 %      MAP       --                Physical Exam    Nursing note  and vitals reviewed.  Constitutional: She appears well-developed and well-nourished. She is not diaphoretic. No distress.   HENT:   Head: Normocephalic and atraumatic.   Right Ear: External ear normal.   Left Ear: External ear normal.   Nose: Nose normal.   Mouth/Throat: Oropharynx is clear and moist. No oropharyngeal exudate.   Eyes: Conjunctivae and EOM are normal. Pupils are equal, round, and reactive to light. Right eye exhibits no discharge. Left eye exhibits no discharge. No scleral icterus.   Neck: Normal range of motion. Neck supple. No thyromegaly present. No tracheal deviation present. No JVD present.   Cardiovascular: Normal rate, regular rhythm, normal heart sounds and intact distal pulses. Exam reveals no gallop and no friction rub.    No murmur heard.  Pulmonary/Chest: Breath sounds normal. No stridor. No respiratory distress. She has no wheezes. She has no rhonchi. She has no rales. She exhibits no tenderness.   Abdominal: Soft. Bowel sounds are normal. She exhibits no distension and no mass. There is no tenderness. There is no rebound and no guarding.   Musculoskeletal: Normal range of motion. She exhibits no edema or tenderness.   Patient has 3 cm wound to the mid left thigh which is bandaged and well care for by wound care, patient also has 2+ edema to bilateral lower extremities to the mid thigh   Lymphadenopathy:     She has no cervical adenopathy.   Neurological: She is alert. She has normal strength. She displays normal reflexes. No cranial nerve deficit or sensory deficit.   Oriented to person and time she thinks is it January and that Pina Cerna is the president.     Skin: Skin is warm and dry. No rash and no abscess noted. No erythema. No pallor.         ED Course   Procedures  Labs Reviewed   CBC W/ AUTO DIFFERENTIAL - Abnormal; Notable for the following components:       Result Value    Hemoglobin 11.4 (*)     Mean Corpuscular Hemoglobin 26.1 (*)     Mean Corpuscular Hemoglobin Conc  29.5 (*)     RDW 21.1 (*)     Immature Granulocytes 0.6 (*)     Lymph # 0.8 (*)     Lymph% 14.2 (*)     All other components within normal limits   COMPREHENSIVE METABOLIC PANEL - Abnormal; Notable for the following components:    Sodium 133 (*)     Potassium 5.2 (*)     Chloride 90 (*)     BUN, Bld 41 (*)     Creatinine 6.3 (*)     Calcium 8.6 (*)     Total Protein 8.9 (*)     Albumin 3.2 (*)     Alkaline Phosphatase 139 (*)     eGFR if  7.0 (*)     eGFR if non  6.1 (*)     All other components within normal limits   C-REACTIVE PROTEIN - Abnormal; Notable for the following components:    CRP 4.25 (*)     All other components within normal limits   FERRITIN - Abnormal; Notable for the following components:    Ferritin 877 (*)     All other components within normal limits   LACTIC ACID, PLASMA - Abnormal; Notable for the following components:    Lactate (Lactic Acid) 2.0 (*)     All other components within normal limits    Narrative:     Lactic Acid critical result(s) called and verbal readback obtained   from Hanna addison RN ER.  by TC1 04/12/2020 19:28   B-TYPE NATRIURETIC PEPTIDE - Abnormal; Notable for the following components:    BNP >4,500 (*)     All other components within normal limits   TSH - Abnormal; Notable for the following components:    TSH 5.910 (*)     All other components within normal limits   ISTAT PROCEDURE - Abnormal; Notable for the following components:    POC PH 7.336 (*)     POC PCO2 59.4 (*)     POC PO2 14 (*)     POC HCO3 31.8 (*)     POC SATURATED O2 15 (*)     POC TCO2 34 (*)     All other components within normal limits   ISTAT PROCEDURE - Abnormal; Notable for the following components:    POC PO2 142 (*)     POC TCO2 28 (*)     All other components within normal limits   CULTURE, BLOOD   CULTURE, BLOOD   LACTATE DEHYDROGENASE   CK   TROPONIN I   SARS-COV-2 RNA AMPLIFICATION, QUAL    Narrative:     What symptom criteria does the patient  meet?->Difficulty  breathing  What symptom criteria does the patient meet?->Cough   PROCALCITONIN   MAGNESIUM   AMMONIA   T4, FREE   PROTIME-INR     EKG Readings: (Independently Interpreted)   Initial Reading: No STEMI. Rhythm: Atrial Fibrillation. Heart Rate: 109. Ectopy: No Ectopy. Axis: Left Axis Deviation.     ECG Results          EKG 12-lead (In process)  Result time 04/12/20 18:17:09    In process by Interface, Lab In [facility] (04/12/20 18:17:09)                 Narrative:    Test Reason : R68.89,    Vent. Rate : 107 BPM     Atrial Rate : 340 BPM     P-R Int : 000 ms          QRS Dur : 094 ms      QT Int : 350 ms       P-R-T Axes : 000 -43 151 degrees     QTc Int : 467 ms    Atrial fibrillation with rapid ventricular response  Left axis deviation  Cannot rule out Anterior infarct ,age undetermined  T wave abnormality, consider lateral ischemia  Abnormal ECG  When compared with ECG of 14-MAR-2020 05:37,  ST no longer depressed in Anterior leads  T wave inversion less evident in Anterior-lateral leads    Referred By: [email or web address]   SELF           Confirmed By:                             Imaging Results          CT Head Without Contrast (Final result)  Result time 04/12/20 18:15:00    Final result by Nga Porter MD (04/12/20 18:15:00)                 Narrative:    CMS MANDATED QUALITY DATA - CT RADIATION 436. CT scans at this  facility utilize dose modulation, iterative reconstruction, and/or  weight based dosing when appropriate to reduce radiation dose to as  low as reasonably achievable.    PROCEDURE:    CT HEAD WITHOUT CONTRAST  dated  4/12/2020 6:13 PM    CLINICAL HISTORY:   Female 74 years of age.   Confusion/delirium,  altered, unexplained.    TECHNIQUE: CT images were acquired from the foramen magnum to the  vertex. Intravenous contrast was not administered.    COMPARISON:  January 2, 2020    FINDINGS:    There is no intracranial hemorrhage, extra-axial fluid collection or  edema. There is stable  bilateral white matter low-attenuation from  chronic small vessel ischemic disease, and chronic small infarct the  right basal ganglia and centrum semiovale. Extracranial soft tissue is  normal. The calvarium is intact.    IMPRESSION:    No acute findings. No change compared with the prior study.    Electronically Signed by Nga Porter on 4/12/2020 6:29 PM                             X-Ray Chest AP Portable (Final result)  Result time 04/12/20 18:02:36    Final result by Nga Porter MD (04/12/20 18:02:36)                 Narrative:    PROCEDURE:   XR CHEST AP PORTABLE  dated  4/12/2020 5:23 PM    CLINICAL HISTORY:   Female 74 years of age.   Suspected Covid-19 Virus  Infection    TECHNIQUE: AP view of the chest obtained portably at 5:23 PM.    PREVIOUS STUDIES: Radiography March 14, 2020; February 3, 2020; and  January 2, 2020. CT abdomen/pelvis January 3, 2020.    Every 3 2020    FINDINGS:    Mild to moderate cardiomegaly is unchanged. There is a mitral valve  prosthesis.    Right-sided pleural effusion is chronic and loculated, corresponding  with findings on CT abdomen January 3, 2020. There is a greater degree  of opacification projecting over the right lung base and mid lung,  possibly acute infiltrates. The left lung is clear. There is no  pneumothorax.    IMPRESSION:      1. Clear left lung.  2. Some of the opacification of the right hemithorax is the known  loculated chronic pleural effusion. The opacification is greater than  on the prior studies, possibly due to to an acute pneumonia.    Electronically Signed by Nga Porter on 4/12/2020 6:36 PM                               Medical Decision Making:   History:   Old Medical Records: I decided to obtain old medical records.  Initial Assessment:   Emergent evaluation of a 74-year-old female presenting with cough and shortness of breath differential diagnosis includes URI, electrolyte abnormality, endocrine dysfunction, infection, novel corona  virus.              Attending Attestation:             Attending ED Notes:   Patient noted to be hypoxic on room air, patient diagnosed with fluid overload, oxygen saturations 85% on room air, this improves with oxygen, patient will be admitted to Internal Medicine for further evaluation and management.                        Clinical Impression:       ICD-10-CM ICD-9-CM   1. Suspected Covid-19 Virus Infection R68.89    2. Shortness of breath R06.02 786.05             ED Disposition Condition    Observation                           Chano Hanley MD  04/12/20 [id number]

## 2020-04-13 PROBLEM — Z20.822 SUSPECTED COVID-19 VIRUS INFECTION: Status: RESOLVED | Noted: 2020-04-13 | Resolved: 2020-04-13

## 2020-04-13 PROBLEM — Z20.822 SUSPECTED COVID-19 VIRUS INFECTION: Status: ACTIVE | Noted: 2020-04-13

## 2020-04-13 LAB
ANION GAP SERPL CALC-SCNC: 17 MMOL/L (ref 8–16)
BASOPHILS # BLD AUTO: 0.02 K/UL (ref 0–0.2)
BASOPHILS NFR BLD: 0.2 % (ref 0–1.9)
BUN SERPL-MCNC: 43 MG/DL (ref 8–23)
CALCIUM SERPL-MCNC: 8.3 MG/DL (ref 8.7–10.5)
CHLORIDE SERPL-SCNC: 96 MMOL/L (ref 95–110)
CO2 SERPL-SCNC: 20 MMOL/L (ref 23–29)
CREAT SERPL-MCNC: 6.4 MG/DL (ref 0.5–1.4)
DIFFERENTIAL METHOD: ABNORMAL
EOSINOPHIL # BLD AUTO: 0 K/UL (ref 0–0.5)
EOSINOPHIL NFR BLD: 0 % (ref 0–8)
ERYTHROCYTE [DISTWIDTH] IN BLOOD BY AUTOMATED COUNT: 21.2 % (ref 11.5–14.5)
EST. GFR  (AFRICAN AMERICAN): 6.8 ML/MIN/1.73 M^2
EST. GFR  (NON AFRICAN AMERICAN): 5.9 ML/MIN/1.73 M^2
GLUCOSE SERPL-MCNC: 67 MG/DL (ref 70–110)
HCT VFR BLD AUTO: 39 % (ref 37–48.5)
HGB BLD-MCNC: 11.8 G/DL (ref 12–16)
IMM GRANULOCYTES # BLD AUTO: 0.09 K/UL (ref 0–0.04)
IMM GRANULOCYTES NFR BLD AUTO: 1 % (ref 0–0.5)
INR PPP: 2.3
LYMPHOCYTES # BLD AUTO: 0.8 K/UL (ref 1–4.8)
LYMPHOCYTES NFR BLD: 9.6 % (ref 18–48)
MAGNESIUM SERPL-MCNC: 2.2 MG/DL (ref 1.6–2.6)
MCH RBC QN AUTO: 26.6 PG (ref 27–31)
MCHC RBC AUTO-ENTMCNC: 30.3 G/DL (ref 32–36)
MCV RBC AUTO: 88 FL (ref 82–98)
MONOCYTES # BLD AUTO: 1.1 K/UL (ref 0.3–1)
MONOCYTES NFR BLD: 13.3 % (ref 4–15)
NEUTROPHILS # BLD AUTO: 6.5 K/UL (ref 1.8–7.7)
NEUTROPHILS NFR BLD: 75.9 % (ref 38–73)
NRBC BLD-RTO: 0 /100 WBC
PLATELET # BLD AUTO: 149 K/UL (ref 150–350)
PMV BLD AUTO: 10.1 FL (ref 9.2–12.9)
POTASSIUM SERPL-SCNC: 6 MMOL/L (ref 3.5–5.1)
PROTHROMBIN TIME: 24.1 SEC (ref 10.6–14.8)
RBC # BLD AUTO: 4.44 M/UL (ref 4–5.4)
SODIUM SERPL-SCNC: 133 MMOL/L (ref 136–145)
WBC # BLD AUTO: 8.58 K/UL (ref 3.9–12.7)

## 2020-04-13 PROCEDURE — 85025 COMPLETE CBC W/AUTO DIFF WBC: CPT

## 2020-04-13 PROCEDURE — U0002 COVID-19 LAB TEST NON-CDC: HCPCS

## 2020-04-13 PROCEDURE — 85610 PROTHROMBIN TIME: CPT

## 2020-04-13 PROCEDURE — 21000000 HC CCU ICU ROOM CHARGE

## 2020-04-13 PROCEDURE — 36415 COLL VENOUS BLD VENIPUNCTURE: CPT

## 2020-04-13 PROCEDURE — 83735 ASSAY OF MAGNESIUM: CPT

## 2020-04-13 PROCEDURE — 63600175 PHARM REV CODE 636 W HCPCS: Performed by: INTERNAL MEDICINE

## 2020-04-13 PROCEDURE — 80048 BASIC METABOLIC PNL TOTAL CA: CPT

## 2020-04-13 PROCEDURE — 90935 HEMODIALYSIS ONE EVALUATION: CPT

## 2020-04-13 PROCEDURE — 80074 ACUTE HEPATITIS PANEL: CPT

## 2020-04-13 PROCEDURE — 25000003 PHARM REV CODE 250: Performed by: NURSE PRACTITIONER

## 2020-04-13 RX ORDER — MORPHINE SULFATE 2 MG/ML
2 INJECTION, SOLUTION INTRAMUSCULAR; INTRAVENOUS EVERY 4 HOURS PRN
Status: DISCONTINUED | OUTPATIENT
Start: 2020-04-13 | End: 2020-04-16 | Stop reason: HOSPADM

## 2020-04-13 RX ORDER — HALOPERIDOL 5 MG/ML
2.5 INJECTION INTRAMUSCULAR ONCE
Status: COMPLETED | OUTPATIENT
Start: 2020-04-13 | End: 2020-04-13

## 2020-04-13 RX ORDER — LORAZEPAM 2 MG/ML
0.5 INJECTION INTRAMUSCULAR ONCE
Status: COMPLETED | OUTPATIENT
Start: 2020-04-13 | End: 2020-04-13

## 2020-04-13 RX ORDER — MORPHINE SULFATE 2 MG/ML
2 INJECTION, SOLUTION INTRAMUSCULAR; INTRAVENOUS ONCE
Status: COMPLETED | OUTPATIENT
Start: 2020-04-13 | End: 2020-04-13

## 2020-04-13 RX ADMIN — LORAZEPAM 0.5 MG: 2 INJECTION, SOLUTION INTRAMUSCULAR; INTRAVENOUS at 08:04

## 2020-04-13 RX ADMIN — DILTIAZEM HYDROCHLORIDE 10 MG/HR: 5 INJECTION INTRAVENOUS at 06:04

## 2020-04-13 RX ADMIN — MORPHINE SULFATE 2 MG: 2 INJECTION, SOLUTION INTRAMUSCULAR; INTRAVENOUS at 03:04

## 2020-04-13 RX ADMIN — MORPHINE SULFATE 2 MG: 2 INJECTION, SOLUTION INTRAMUSCULAR; INTRAVENOUS at 10:04

## 2020-04-13 RX ADMIN — LORAZEPAM 0.5 MG: 2 INJECTION INTRAMUSCULAR; INTRAVENOUS at 09:04

## 2020-04-13 RX ADMIN — MORPHINE SULFATE 2 MG: 2 INJECTION, SOLUTION INTRAMUSCULAR; INTRAVENOUS at 08:04

## 2020-04-13 RX ADMIN — OXYCODONE HYDROCHLORIDE AND ACETAMINOPHEN 1 TABLET: 10; 325 TABLET ORAL at 07:04

## 2020-04-13 RX ADMIN — HALOPERIDOL LACTATE 2.5 MG: 5 INJECTION, SOLUTION INTRAMUSCULAR at 11:04

## 2020-04-13 NOTE — SUBJECTIVE & OBJECTIVE
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    both legs    CHF (congestive heart failure)     Encounter for blood transfusion 03/2016    Gout     Hypertension     Mitral valve regurgitation     Osteoarthritis     Pancreatitis     Peripheral vascular disease     Peritoneal dialysis catheter in place     Pneumonia 09/09/2017    Renal failure        Past Surgical History:   Procedure Laterality Date    ACHILLES TENDON SURGERY Right     ANGIOGRAPHY OF ARTERIOVENOUS SHUNT Right 1/7/2020    Procedure: Fistulogram with Possible Intervention;  Surgeon: Joseph Loyola MD;  Location: [facility] CATH/EP LAB;  Service: Cardiology;  Laterality: Right;    ANGIOGRAPHY OF LOWER EXTREMITY Left 3/18/2020    Procedure: Angiogram Extremity Unilateral;  Surgeon: Ali Khoobehi, MD;  Location: [facility] CATH/EP LAB;  Service: Cardiology;  Laterality: Left;    APPENDECTOMY      CARDIAC CATHETERIZATION  07/03/2017    CHOLECYSTECTOMY      heal surgery Right     HYSTERECTOMY      INSERTION OF STENT INTO PERIPHERAL VESSEL N/A 1/7/2020    Procedure: INSERTION, STENT, VESSEL, PERIPHERAL;  Surgeon: Joseph Loyola MD;  Location: [facility] CATH/EP LAB;  Service: Cardiology;  Laterality: N/A;    PARATHYROIDECTOMY      PARATHYROIDECTOMY  07/13/2017    PERCUTANEOUS TRANSLUMINAL ANGIOPLASTY OF ARTERIOVENOUS FISTULA N/A 1/7/2020    Procedure: PTA, AV FISTULA;  Surgeon: Joseph Loyola MD;  Location: [facility] CATH/EP LAB;  Service: Cardiology;  Laterality: N/A;    PERITONEAL CATHETER INSERTION      RENAL BIOPSY      vocal cord nodule         Review of patient's allergies indicates:  No Known Allergies    No current facility-administered medications on file prior to encounter.      Current Outpatient Medications on File Prior to Encounter   Medication Sig    aspirin (ECOTRIN) 81 MG EC tablet Take 81 mg by mouth once daily.    calcium acetate (PHOSLO) 667 mg capsule Take 667 mg by mouth.    diltiaZEM (CARDIZEM  CD) 180 MG 24 hr capsule Take 180 mg by mouth once daily.    isosorbide mononitrate (IMDUR) 30 MG 24 hr tablet Take 30 mg by mouth once daily.    losartan (COZAAR) 25 MG tablet Take 25 mg by mouth once daily.    magnesium oxide (MAG-OX) 400 mg (241.3 mg magnesium) tablet Take 400 mg by mouth once daily.    metoprolol succinate (TOPROL-XL) 50 MG 24 hr tablet Take 1 tablet (50 mg total) by mouth once daily. (Patient taking differently: Take 50 mg by mouth once daily. )    mupirocin (BACTROBAN) 2 % ointment Apply topically 3 (three) times daily.    ondansetron (ZOFRAN-ODT) 4 MG TbDL Take 1 tablet (4 mg total) by mouth every 6 (six) hours as needed.    oxyCODONE-acetaminophen (PERCOCET)  mg per tablet Patient states taking 5-325mg bid at home. To continue    traMADol (ULTRAM) 50 mg tablet Take 1 tablet (50 mg total) by mouth 3 (three) times a week. 3 TIMES A WEEK AS NEEDED DURING HEMODIALYSIS    warfarin (COUMADIN) 1 MG tablet Take 2 tablets (2 mg total) by mouth Daily.     Family History     Problem Relation (Age of Onset)    Arthritis Mother    Diabetes Mother, Father    Early death Sister, Sister    Heart disease Sister, Maternal Grandfather, Mother    Heart failure Mother    Hypertension Mother        Tobacco Use    Smoking status: Current Some Day Smoker     Packs/day: 0.50     Years: 45.00     Pack years: 22.50     Types: Cigarettes    Smokeless tobacco: Never Used   Substance and Sexual Activity    Alcohol use: No    Drug use: No    Sexual activity: Not on file     Review of Systems   Unable to perform ROS: Mental status change     Objective:     Vital Signs (Most Recent):  Temp: 98.3 °F (36.8 °C) (04/12/20 1949)  Pulse: (!) 124 (04/12/20 2050)  Resp: 20 (04/12/20 2050)  BP: (!) 160/103 (04/12/20 1800)  SpO2: 98 % (04/12/20 2050) Vital Signs (24h Range):  Temp:  [98.3 °F (36.8 °C)] 98.3 °F (36.8 °C)  Pulse:  [112-124] 124  Resp:  [20-27] 20  SpO2:  [98 %-100 %] 98 %  BP: (152-160)/()  160/103     Weight: 65.8 kg (145 lb)  Body mass index is 24.13 kg/m².    Physical Exam   Constitutional:   Chronically ill, cachectic female   HENT:   Head: Normocephalic and atraumatic.   Eyes: Right eye exhibits no discharge. Left eye exhibits no discharge.   Neck: JVD present.   Cardiovascular:   Irregular, tachycardic   Pulmonary/Chest:   Bronchial breath sound bilaterally   Abdominal: Soft. Bowel sounds are normal.   Genitourinary: Vagina normal.   Musculoskeletal: She exhibits no tenderness. Edema: 1+ edema bilateral.   Neurological:   Awake, moves all extremities, able to states that she is in the hospital but unable to answer other questions on orientation, no focal deficits   Skin: Skin is warm and dry. Capillary refill takes less than 2 seconds. She is not diaphoretic.   Psychiatric:   Anxious           Significant Labs:   CBC:   Recent Labs   Lab 04/12/20  1746   WBC 5.43   HGB 11.4*   HCT 38.7        CMP:   Recent Labs   Lab 04/12/20  1746   *   K 5.2*   CL 90*   CO2 27   GLU 83   BUN 41*   CREATININE 6.3*   CALCIUM 8.6*   PROT 8.9*   ALBUMIN 3.2*   BILITOT 0.9   ALKPHOS 139*   AST 16   ALT 10   ANIONGAP 16   EGFRNONAA 6.1*     Cardiac Markers:   Recent Labs   Lab 04/12/20  1746   BNP >4,500*       Lactic Acid:   Recent Labs   Lab 04/12/20  1746   LACTATE 2.0*     Magnesium:   Recent Labs   Lab 04/12/20  1746   MG 2.2     Troponin:   Recent Labs   Lab 04/12/20  1746   TROPONINI 0.030     TSH:   Recent Labs   Lab 04/12/20  1746   TSH 5.910*       Significant Imaging: I have reviewed all pertinent imaging results/findings within the past 24 hours.   Ct Head Without Contrast    Result Date: 4/12/2020  CMS MANDATED QUALITY DATA - CT RADIATION 436. CT scans at this facility utilize dose modulation, iterative reconstruction, and/or weight based dosing when appropriate to reduce radiation dose to as low as reasonably achievable. PROCEDURE:    CT HEAD WITHOUT CONTRAST  dated  4/12/2020 6:13 PM  CLINICAL HISTORY:   Female 74 years of age.   Confusion/delirium, altered, unexplained. TECHNIQUE: CT images were acquired from the foramen magnum to the vertex. Intravenous contrast was not administered. COMPARISON:  January 2, 2020 FINDINGS: There is no intracranial hemorrhage, extra-axial fluid collection or edema. There is stable bilateral white matter low-attenuation from chronic small vessel ischemic disease, and chronic small infarct the right basal ganglia and centrum semiovale. Extracranial soft tissue is normal. The calvarium is intact. IMPRESSION: No acute findings. No change compared with the prior study. Electronically Signed by Nga Porter on 4/12/2020 6:29 PM        03/14/2020 Time:    10:48    X-ray Chest Ap Portable    Result Date: 4/12/2020  PROCEDURE:   XR CHEST AP PORTABLE  dated  4/12/2020 5:23 PM CLINICAL HISTORY:   Female 74 years of age.   Suspected Covid-19 Virus Infection TECHNIQUE: AP view of the chest obtained portably at 5:23 PM. PREVIOUS STUDIES: Radiography March 14, 2020; February 3, 2020; and January 2, 2020. CT abdomen/pelvis January 3, 2020. Every 3 2020 FINDINGS: Mild to moderate cardiomegaly is unchanged. There is a mitral valve prosthesis. Right-sided pleural effusion is chronic and loculated, corresponding with findings on CT abdomen January 3, 2020. There is a greater degree of opacification projecting over the right lung base and mid lung, possibly acute infiltrates. The left lung is clear. There is no pneumothorax. IMPRESSION: 1. Clear left lung. 2. Some of the opacification of the right hemithorax is the known loculated chronic pleural effusion. The opacification is greater than on the prior studies, possibly due to to an acute pneumonia. Electronically Signed by Nga Porter on 4/12/2020 6:36 PM

## 2020-04-13 NOTE — NURSING
Pt screaming loudly, says she is hurting she is pulling off her monitor and oxi mask. Dr way notified, orders for prn morphine to follow

## 2020-04-13 NOTE — ASSESSMENT & PLAN NOTE
A sister reports that she is more shortness of breath than usual .  Afebrile here.  Did not check temperature at home  Rapid COVID-19 screen Negative  Likely multifactorial  Continue to smoke  History of COPD, on home O2  Appears to be volume overload  Elevated JVD  Bronchial breath sounds bilat  O2 sat acceptable on 4 L NC  Increase opacification right lung, possibly acute infiltrates  Procalcitonin not elevated, no antibiotic for now  SOB could be from A fib w RVR  Has pulmonary hypertension, PAS in the 50s per echo on 1/3/2020

## 2020-04-13 NOTE — ASSESSMENT & PLAN NOTE
Stable  Results for SRIKANTH LUONG DARRICK (MRN [id number]) as of 4/12/2020 21:21   Ref. Range 3/17/2020 20:08 3/18/2020 05:26 3/19/2020 05:46 3/19/2020 08:28 4/12/2020 17:46   Hemoglobin Latest Ref Range: 12.0 - 16.0 g/dL  10.0 (L) 9.9 (L)  11.4 (L)   Hematocrit Latest Ref Range: 37.0 - 48.5 %  32.9 (L) 33.5 (L)  38.7   monitor

## 2020-04-13 NOTE — HOSPITAL COURSE
Patient was admitted as above.  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  Cardizem drip was started for all RVR and it was later stopped because of bradycardia  Patient is planned for hemodialysis today    4/14/2020  Pt was very upset this AM, appeared confused and spoke of pain but could not localize the pain. She had multiple medications last PM including Haldol.S  Multiple tests ordered:  CT head without contrast-no acute finding  EKG-afib with RVR pt is on cardizem  ABG-patient refused  Assessment:  Afib with RVR off cardizem drip with 180 mg PO Q AM and heart rate   Metabolic encephalopathy-resolving  PLAN:reduce oxycodone to 5/325 mg Q 6 prn pain    4/15/20  Pt is awake and alert , sitting up in a chair. She states that she is unsure if we are taking good care of her and I asurred her that she was receiving great care. She would not answer any further questions.    4/16/2020  Pt is sleeping and very calm with no complaints at present. I have spoken to her sister Aide and she states that she will be ready to take her into her home with a hospital bed because she is unable to move the paitent.  5 PM-the patient's sister wishes to pick her up. We attempted to order a hospital bed but she has one that is broken an must be fixed-DME will not send out another. Pt refuses NHP. Her son will come from [place] to care for her.

## 2020-04-13 NOTE — CONSULTS
Consult Note  Nephrology    Griselda Neely  female  74 y.o.  Consult Requested By: David Gaytan MD  Reason for Consult: ESRD    TELEMEDICINE VISIT    Telemedicine visit via [employer] with the assistance of the nurse(s) caring for the patient.  Below is the documentation of the visit where I have personally visualized or heard information or I have noted that the nurse told me of information regarding the patient.  55 minute visit.      SUBJECTIVE:     History of Present Illness:  Mrs. Dave Neely is a 74-year-old woman with COPD, CHF, atrial fib and calciphylaxis who we follow for ESRD.  She comes into the hospital frequently with shortness of breath and confusion.  She comes in with the same complaints now but also is being ruled out for Covid-19.  She was last dialyzed 3 days ago but tends to sign off early because of her left leg pain from calciphylaxis.  She gets Percocet but still has breakthrough pain.  I saw her on Friday and she was complaining of pain but I do not know whether she signed off early.  Nephrology has been consulted to assist with her ESRD management.    Review Of Systems:  GEN:  No fever, chills, night sweats, decreased intake.  HEENT: No blurry vision, glasses, floaters, hearing defects, sore throat.  CV:  No CP, palpitations, edema, ARGUELLO, orthopnea, PND.  PULM:  SLB, wheezing and dry cough.  GI:  No nausea, vomiting, constipation, diarrhea, melena, hematochezia, abdominal pain.  :  No dysuria, urgency, frequency, cloudy urine, red urine, dribbling, double voiding, incontinence, hx of stones.  NEURO: No LOC, seizure activity, dizziness.  SKIN:  No rash, pruritis, spots, sores.  MS:  No arthralgias, joint swelling, redness or warmth.    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    both legs    CHF (congestive heart failure)     Encounter for blood transfusion 03/2016    Gout     Hypertension     Mitral valve regurgitation     Osteoarthritis      Pancreatitis     Peripheral vascular disease     Peritoneal dialysis catheter in place     Pneumonia 09/09/2017    Renal failure      Past Surgical History:   Procedure Laterality Date    ACHILLES TENDON SURGERY Right     ANGIOGRAPHY OF ARTERIOVENOUS SHUNT Right 1/7/2020    Procedure: Fistulogram with Possible Intervention;  Surgeon: Joseph Loyola MD;  Location: [facility] CATH/EP LAB;  Service: Cardiology;  Laterality: Right;    ANGIOGRAPHY OF LOWER EXTREMITY Left 3/18/2020    Procedure: Angiogram Extremity Unilateral;  Surgeon: Ali Khoobehi, MD;  Location: [facility] CATH/EP LAB;  Service: Cardiology;  Laterality: Left;    APPENDECTOMY      CARDIAC CATHETERIZATION  07/03/2017    CHOLECYSTECTOMY      heal surgery Right     HYSTERECTOMY      INSERTION OF STENT INTO PERIPHERAL VESSEL N/A 1/7/2020    Procedure: INSERTION, STENT, VESSEL, PERIPHERAL;  Surgeon: Joseph Loyola MD;  Location: [facility] CATH/EP LAB;  Service: Cardiology;  Laterality: N/A;    PARATHYROIDECTOMY      PARATHYROIDECTOMY  07/13/2017    PERCUTANEOUS TRANSLUMINAL ANGIOPLASTY OF ARTERIOVENOUS FISTULA N/A 1/7/2020    Procedure: PTA, AV FISTULA;  Surgeon: Joseph Loyola MD;  Location: [facility] CATH/EP LAB;  Service: Cardiology;  Laterality: N/A;    PERITONEAL CATHETER INSERTION      RENAL BIOPSY      vocal cord nodule       Family History   Problem Relation Age of Onset    Heart disease Sister     Early death Sister         heart as baby    Heart disease Maternal Grandfather     Diabetes Mother     Hypertension Mother     Heart failure Mother     Heart disease Mother     Arthritis Mother     Diabetes Father     Early death Sister         infant     Social History     Tobacco Use    Smoking status: Current Some Day Smoker     Packs/day: 0.50     Years: 45.00     Pack years: 22.50     Types: Cigarettes    Smokeless tobacco: Never Used   Substance Use Topics    Alcohol use: No    Drug use: No      Review of patient's allergies  indicates:  No Known Allergies   Prior to Admission medications    Medication Sig Start Date End Date Taking? Authorizing Provider   aspirin (ECOTRIN) 81 MG EC tablet Take 81 mg by mouth once daily.    Historical Provider, MD   calcium acetate (PHOSLO) 667 mg capsule Take 667 mg by mouth.    Historical Provider, MD   diltiaZEM (CARDIZEM CD) 180 MG 24 hr capsule Take 180 mg by mouth once daily.    Historical Provider, MD   isosorbide mononitrate (IMDUR) 30 MG 24 hr tablet Take 30 mg by mouth once daily.    Historical Provider, MD   losartan (COZAAR) 25 MG tablet Take 25 mg by mouth once daily.    Historical Provider, MD   magnesium oxide (MAG-OX) 400 mg (241.3 mg magnesium) tablet Take 400 mg by mouth once daily.    Historical Provider, MD   metoprolol succinate (TOPROL-XL) 50 MG 24 hr tablet Take 1 tablet (50 mg total) by mouth once daily.  Patient taking differently: Take 50 mg by mouth once daily.  8/10/19 8/9/20  Daren Lea MD   mupirocin (BACTROBAN) 2 % ointment Apply topically 3 (three) times daily. 2/19/20   Kalie Neely MD   ondansetron (ZOFRAN-ODT) 4 MG TbDL Take 1 tablet (4 mg total) by mouth every 6 (six) hours as needed. 2/19/20   Kalie Neely MD   oxyCODONE-acetaminophen (PERCOCET)  mg per tablet Patient states taking 5-325mg bid at home. To continue 3/19/20   Lissa Portillo MD   traMADol (ULTRAM) 50 mg tablet Take 1 tablet (50 mg total) by mouth 3 (three) times a week. 3 TIMES A WEEK AS NEEDED DURING HEMODIALYSIS 10/4/19   Kalie Neely MD   warfarin (COUMADIN) 1 MG tablet Take 2 tablets (2 mg total) by mouth Daily. 3/19/20 4/18/20  Lissa Portillo MD           dilTIAZem 10 mg/hr (04/13/20 0639)       aspirin  81 mg Oral Daily    calcium acetate(phosphat bind)  667 mg Oral TID WM    diltiaZEM  180 mg Oral Daily    isosorbide mononitrate  30 mg Oral Daily    levalbuterol  0.63 mg Nebulization TID WAKE    losartan  25 mg Oral Daily    magnesium oxide  400  mg Oral Daily    metoprolol succinate  50 mg Oral Daily    warfarin  2 mg Oral Daily     acetaminophen, ondansetron, oxyCODONE-acetaminophen, promethazine (PHENERGAN) IVPB, sodium chloride 0.9%       OBJECTIVE:     Vital Signs (Most Recent)  Temp: 100 °F (37.8 °C) (04/13/20 0730)  Pulse: 96 (04/13/20 0730)  Resp: (!) 24 (04/13/20 1100)  BP: (!) 166/101 (04/13/20 0730)  SpO2: 98 % (04/12/20 2300)    Vital Signs Range (Last 24H):  Temp:  [98.3 °F (36.8 °C)-100 °F (37.8 °C)]   Pulse:  []   Resp:  [19-54]   BP: (133-188)/()   SpO2:  [55 %-100 %]     Physical Exam:  Getting ready to have dialysis.  Very anxious.  Complains of shortness of breath and appears dyspneic.  Laboratory:  Recent Labs   Lab 04/12/20 1746 04/13/20  0530   * 133*   K 5.2* 6.0*   CL 90* 96   CO2 27 20*   BUN 41* 43*   CREATININE 6.3* 6.4*   GLU 83 67*   CALCIUM 8.6* 8.3*   CPK 35  --        Recent Labs   Lab 04/12/20 1746 04/13/20  0530   WBC 5.43 8.58   HGB 11.4* 11.8*   HCT 38.7 39.0    149*       Active Hospital Problems    Diagnosis  POA    *Shortness of breath [R06.02]  Yes    Acute on chronic combined systolic and diastolic heart failure [I50.43]  Yes    Atrial fibrillation with RVR [I48.91]  Yes    Encephalopathy [G93.40]  Unknown    DNR (do not resuscitate) [Z66]  Yes     Chronic    Chronic respiratory failure [J96.10]  Yes     Chronic     3 L O2 at home      Anemia due to chronic kidney disease [N18.9, D63.1]  Yes     Likely of chronic disease.       Hyperparathyroidism due to renal insufficiency [N25.81]  Yes    ESRD (end stage renal disease) on HD M,W, F [N18.6]  Yes     Chronic    HTN (hypertension) [I10]  Yes     Chronic      Resolved Hospital Problems   No resolved problems to display.       ASSESSMENT/PLAN:     1.  Shortness of breath  At least a component of volume overload  Chest x-ray shows vascular congestion and her BNP is markedly elevated  She also may have a pneumonia since the chest  x-ray suggests a right lower lobe infiltrate and her lactate was elevated  Defer to hospital medicine for antibiotic selection    2.  Hyperkalemia  Use a low K dialysate  Monitor serum potassium    3.  COPD  The patient still smokes and certainly there is a component of COPD  Defer to hospital medicine and pulmonary    4.  Calciphylaxis  She has the left leg wound which causes her severe pain  Have wound care evaluate it  Give sodium thiosulfate with dialysis    5.  Anemia  She is at the ESRD goal  No need for DONNY's    6.  A. Fib with RVR  Treated with Cardizem which is off now  Defer to cardiology and hospital medicine    Joseph Ingram M.D.

## 2020-04-13 NOTE — H&P
[facility] Medicine  History & Physical  Date of service:4/12/2020  At 1944    Patient Name: Griselda Neely  MRN: [id number]  Admission Date: 4/12/2020  Attending Physician: Homer Carlson MD   Primary Care Provider: Kalie Neely MD         Patient information was obtained from patient, relative(s), past medical records and ER records.     Subjective:     Principal Problem:Shortness of breath    Chief Complaint:   Chief Complaint   Patient presents with    Shortness of Breath        HPI: The patient is a 74-year-old female with history of CHF, end-stage renal disease - on HD, pneumonia, peripheral vascular disease, atrial fibrillation- on warfarin, hypertension, hyperlipidemia.   She presented to the ED with cough and shortness of breath. She lives with her sister, Aide. Per her other sister, Evli Hi at [phone], she has COPD and on home O2 at 3 L NC. She continues to smoke. She appears to be confused and more shortness of breath than usual. 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 She has not complained of other pain or discomfort. Her sister does not know whether the patient has been running fever. Her other sister usually prepares her medication but not certain whether the patient has taken them today. Her sister states that the patient has chronic cough which does not appear to be worsen.         In the ED:  Afebrile  Tachycardic  Tachypneic  BP OK  WBC 5.43  H&H 11.4/38.7  K 5.2  Ferritin 877  CRP 4.25  BNP > 4500  Lactic acid 2.0  Procalcitonin 0.42  Chest radiograph: Increased opacification r hemithorax  CT head Negative  EKG, personally reviewed, atrial fibrillation, rate 107, no ST elevation    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    both legs    CHF (congestive heart failure)     Encounter for blood transfusion 03/2016    Gout     Hypertension     Mitral valve regurgitation      Osteoarthritis     Pancreatitis     Peripheral vascular disease     Peritoneal dialysis catheter in place     Pneumonia 09/09/2017    Renal failure        Past Surgical History:   Procedure Laterality Date    ACHILLES TENDON SURGERY Right     ANGIOGRAPHY OF ARTERIOVENOUS SHUNT Right 1/7/2020    Procedure: Fistulogram with Possible Intervention;  Surgeon: Joseph Loyola MD;  Location: [facility] CATH/EP LAB;  Service: Cardiology;  Laterality: Right;    ANGIOGRAPHY OF LOWER EXTREMITY Left 3/18/2020    Procedure: Angiogram Extremity Unilateral;  Surgeon: Ali Khoobehi, MD;  Location: [facility] CATH/EP LAB;  Service: Cardiology;  Laterality: Left;    APPENDECTOMY      CARDIAC CATHETERIZATION  07/03/2017    CHOLECYSTECTOMY      heal surgery Right     HYSTERECTOMY      INSERTION OF STENT INTO PERIPHERAL VESSEL N/A 1/7/2020    Procedure: INSERTION, STENT, VESSEL, PERIPHERAL;  Surgeon: Joseph Loyola MD;  Location: [facility] CATH/EP LAB;  Service: Cardiology;  Laterality: N/A;    PARATHYROIDECTOMY      PARATHYROIDECTOMY  07/13/2017    PERCUTANEOUS TRANSLUMINAL ANGIOPLASTY OF ARTERIOVENOUS FISTULA N/A 1/7/2020    Procedure: PTA, AV FISTULA;  Surgeon: Joseph Loyola MD;  Location: [facility] CATH/EP LAB;  Service: Cardiology;  Laterality: N/A;    PERITONEAL CATHETER INSERTION      RENAL BIOPSY      vocal cord nodule         Review of patient's allergies indicates:  No Known Allergies    No current facility-administered medications on file prior to encounter.      Current Outpatient Medications on File Prior to Encounter   Medication Sig    aspirin (ECOTRIN) 81 MG EC tablet Take 81 mg by mouth once daily.    calcium acetate (PHOSLO) 667 mg capsule Take 667 mg by mouth.    diltiaZEM (CARDIZEM CD) 180 MG 24 hr capsule Take 180 mg by mouth once daily.    isosorbide mononitrate (IMDUR) 30 MG 24 hr tablet Take 30 mg by mouth once daily.    losartan (COZAAR) 25 MG tablet Take 25 mg by mouth once daily.    magnesium oxide  (MAG-OX) 400 mg (241.3 mg magnesium) tablet Take 400 mg by mouth once daily.    metoprolol succinate (TOPROL-XL) 50 MG 24 hr tablet Take 1 tablet (50 mg total) by mouth once daily. (Patient taking differently: Take 50 mg by mouth once daily. )    mupirocin (BACTROBAN) 2 % ointment Apply topically 3 (three) times daily.    ondansetron (ZOFRAN-ODT) 4 MG TbDL Take 1 tablet (4 mg total) by mouth every 6 (six) hours as needed.    oxyCODONE-acetaminophen (PERCOCET)  mg per tablet Patient states taking 5-325mg bid at home. To continue    traMADol (ULTRAM) 50 mg tablet Take 1 tablet (50 mg total) by mouth 3 (three) times a week. 3 TIMES A WEEK AS NEEDED DURING HEMODIALYSIS    warfarin (COUMADIN) 1 MG tablet Take 2 tablets (2 mg total) by mouth Daily.     Family History     Problem Relation (Age of Onset)    Arthritis Mother    Diabetes Mother, Father    Early death Sister, Sister    Heart disease Sister, Maternal Grandfather, Mother    Heart failure Mother    Hypertension Mother        Tobacco Use    Smoking status: Current Some Day Smoker     Packs/day: 0.50     Years: 45.00     Pack years: 22.50     Types: Cigarettes    Smokeless tobacco: Never Used   Substance and Sexual Activity    Alcohol use: No    Drug use: No    Sexual activity: Not on file     Review of Systems   Unable to perform ROS: Mental status change     Objective:     Vital Signs (Most Recent):  Temp: 98.3 °F (36.8 °C) (04/12/20 1949)  Pulse: (!) 124 (04/12/20 2050)  Resp: 20 (04/12/20 2050)  BP: (!) 160/103 (04/12/20 1800)  SpO2: 98 % (04/12/20 2050) Vital Signs (24h Range):  Temp:  [98.3 °F (36.8 °C)] 98.3 °F (36.8 °C)  Pulse:  [112-124] 124  Resp:  [20-27] 20  SpO2:  [98 %-100 %] 98 %  BP: (152-160)/() 160/103     Weight: 65.8 kg (145 lb)  Body mass index is 24.13 kg/m².    Physical Exam   Constitutional:   Chronically ill, cachectic female   HENT:   Head: Normocephalic and atraumatic.   Eyes: Right eye exhibits no discharge. Left  eye exhibits no discharge.   Neck: JVD present.   Cardiovascular:   Irregular, tachycardic   Pulmonary/Chest:   Bronchial breath sound bilaterally   Abdominal: Soft. Bowel sounds are normal.   Genitourinary: Vagina normal.   Musculoskeletal: She exhibits no tenderness. Edema: 1+ edema bilateral.   Neurological:   Awake, moves all extremities, able to states that she is in the hospital but unable to answer other questions on orientation, no focal deficits   Skin: Skin is warm and dry. Capillary refill takes less than 2 seconds. She is not diaphoretic.   Psychiatric:   Anxious           Significant Labs:   CBC:   Recent Labs   Lab 04/12/20  1746   WBC 5.43   HGB 11.4*   HCT 38.7        CMP:   Recent Labs   Lab 04/12/20  1746   *   K 5.2*   CL 90*   CO2 27   GLU 83   BUN 41*   CREATININE 6.3*   CALCIUM 8.6*   PROT 8.9*   ALBUMIN 3.2*   BILITOT 0.9   ALKPHOS 139*   AST 16   ALT 10   ANIONGAP 16   EGFRNONAA 6.1*     Cardiac Markers:   Recent Labs   Lab 04/12/20  1746   BNP >4,500*       Lactic Acid:   Recent Labs   Lab 04/12/20  1746   LACTATE 2.0*     Magnesium:   Recent Labs   Lab 04/12/20  1746   MG 2.2     Troponin:   Recent Labs   Lab 04/12/20  1746   TROPONINI 0.030     TSH:   Recent Labs   Lab 04/12/20  1746   TSH 5.910*       Significant Imaging: I have reviewed all pertinent imaging results/findings within the past 24 hours.   Ct Head Without Contrast    Result Date: 4/12/2020  CMS MANDATED QUALITY DATA - CT RADIATION 436. CT scans at this facility utilize dose modulation, iterative reconstruction, and/or weight based dosing when appropriate to reduce radiation dose to as low as reasonably achievable. PROCEDURE:    CT HEAD WITHOUT CONTRAST  dated  4/12/2020 6:13 PM CLINICAL HISTORY:   Female 74 years of age.   Confusion/delirium, altered, unexplained. TECHNIQUE: CT images were acquired from the foramen magnum to the vertex. Intravenous contrast was not administered. COMPARISON:  January 2, 2020  FINDINGS: There is no intracranial hemorrhage, extra-axial fluid collection or edema. There is stable bilateral white matter low-attenuation from chronic small vessel ischemic disease, and chronic small infarct the right basal ganglia and centrum semiovale. Extracranial soft tissue is normal. The calvarium is intact. IMPRESSION: No acute findings. No change compared with the prior study. Electronically Signed by Nga Porter on 4/12/2020 6:29 PM        03/14/2020 Time:    10:48    X-ray Chest Ap Portable    Result Date: 4/12/2020  PROCEDURE:   XR CHEST AP PORTABLE  dated  4/12/2020 5:23 PM CLINICAL HISTORY:   Female 74 years of age.   Suspected Covid-19 Virus Infection TECHNIQUE: AP view of the chest obtained portably at 5:23 PM. PREVIOUS STUDIES: Radiography March 14, 2020; February 3, 2020; and January 2, 2020. CT abdomen/pelvis January 3, 2020. Every 3 2020 FINDINGS: Mild to moderate cardiomegaly is unchanged. There is a mitral valve prosthesis. Right-sided pleural effusion is chronic and loculated, corresponding with findings on CT abdomen January 3, 2020. There is a greater degree of opacification projecting over the right lung base and mid lung, possibly acute infiltrates. The left lung is clear. There is no pneumothorax. IMPRESSION: 1. Clear left lung. 2. Some of the opacification of the right hemithorax is the known loculated chronic pleural effusion. The opacification is greater than on the prior studies, possibly due to to an acute pneumonia. Electronically Signed by Nga Porter on 4/12/2020 6:36 PM        Assessment/Plan:     * Shortness of breath  A sister reports that she is more shortness of breath than usual .  Afebrile here.  Did not check temperature at home  Rapid COVID-19 screen Negative  Likely multifactorial  Continue to smoke  History of COPD, on home O2  Appears to be volume overload  Elevated JVD  Bronchial breath sounds bilat  O2 sat acceptable on 4 L NC  Increase opacification right  lung, possibly acute infiltrates  Procalcitonin not elevated, no antibiotic for now  SOB could be from A fib w RVR  Has pulmonary hypertension, PAS in the 50s per echo on 1/3/2020          Acute on chronic combined systolic and diastolic heart failure  BNP > 4500  Elevated JVD  Lasix 40 mg IV ordered in the ED        Atrial fibrillation with RVR  Not certain whether she took her medication today  Rate now up to 160-170s  Cardizem 5 mg bolus ordered in the ED  Will start cardizem infusion  Await for INR  Continue warfarin        Chronic respiratory failure  Continue O2 supplements  Respiratory treatments  Monitor      ESRD (end stage renal disease) on HD M,W, F  Consult nephrology for HD        Encephalopathy  Confused and disoriented  Etiology unclear  ABG OK  On pain medication  Monitor        Hyperparathyroidism due to renal insufficiency  Chronic medical condition  Continue home medication      DNR (do not resuscitate)  Sister confirmed that she expressed DNR status in the past      Anemia due to chronic kidney disease  Stable  Results for SRIKANTH LUONG (MRN [id number]) as of 4/12/2020 21:21   Ref. Range 3/17/2020 20:08 3/18/2020 05:26 3/19/2020 05:46 3/19/2020 08:28 4/12/2020 17:46   Hemoglobin Latest Ref Range: 12.0 - 16.0 g/dL  10.0 (L) 9.9 (L)  11.4 (L)   Hematocrit Latest Ref Range: 37.0 - 48.5 %  32.9 (L) 33.5 (L)  38.7   monitor      HTN (hypertension)  Chronic medical condition  Continue home medication  Monitor      VTE Risk Mitigation (From admission, onward)         Ordered     warfarin (COUMADIN) tablet 2 mg  Daily      04/12/20 2044     Place sequential compression device  Until discontinued      04/12/20 2040     IP VTE HIGH RISK PATIENT  Once      04/12/20 2040                   REGINA Ca  Department of Hospital Medicine   [facility]

## 2020-04-13 NOTE — ASSESSMENT & PLAN NOTE
Rate now up to 160-170s and later cardizem drip discontinued    Plan   Continue warfarin   Home medications

## 2020-04-13 NOTE — ASSESSMENT & PLAN NOTE
Possible secondary to fluid overload  Resolving    Plan   For HD today   On pain medication  Monitor

## 2020-04-13 NOTE — PLAN OF CARE
04/13/20 0948   CORTEZ Message   Medicare Outpatient and Observation Notification regarding financial responsibility Given to patient/caregiver;Explained to patient/caregiver;Signed/date by patient/caregiver  (Reviewed by phone with pt's sister Erlinda wilson [phone]. )   Date CORTEZ was signed 04/13/20   Time CORTEZ was signed 0947     Aide had no questions regarding this form. A copy will be given to pt's nurse to leave at bedside.

## 2020-04-13 NOTE — NURSING
Pt moaning and states she is in pain, she continuously moans and screams in pain. In the middle of yelling she can answer where she is and what year it is appropriately. Pt will not keep on her oxygen mask.

## 2020-04-13 NOTE — SUBJECTIVE & OBJECTIVE
Interval History:    Review of Systems  Objective:     Vital Signs (Most Recent):  Temp: 97.9 °F (36.6 °C) (04/13/20 1500)  Pulse: 60 (04/13/20 1500)  Resp: (!) 26 (04/13/20 1500)  BP: (!) 123/57 (04/13/20 1500)  SpO2: (!) 94 % (04/13/20 1300) Vital Signs (24h Range):  Temp:  [97.3 °F (36.3 °C)-100 °F (37.8 °C)] 97.9 °F (36.6 °C)  Pulse:  [] 60  Resp:  [19-54] 26  SpO2:  [55 %-100 %] 94 %  BP: (120-188)/() 123/57     Weight: 59 kg (130 lb)  Body mass index is 21.63 kg/m².    Intake/Output Summary (Last 24 hours) at 4/13/2020 1641  Last data filed at 4/13/2020 0600  Gross per 24 hour   Intake 70 ml   Output 0 ml   Net 70 ml      Physical Exam    Significant Labs:   BMP:   Recent Labs   Lab 04/13/20  0530   GLU 67*   *   K 6.0*   CL 96   CO2 20*   BUN 43*   CREATININE 6.4*   CALCIUM 8.3*   MG 2.2     CBC:   Recent Labs   Lab 04/12/20  1746 04/13/20  0530   WBC 5.43 8.58   HGB 11.4* 11.8*   HCT 38.7 39.0    149*       Significant Imaging: I have reviewed all pertinent imaging results/findings within the past 24 hours.

## 2020-04-13 NOTE — ASSESSMENT & PLAN NOTE
Rapid COVID-19 screen Negative  Secondary to fluid overload/COPD    Plan   Procalcitonin not elevated, no antibiotic for now  For HD today   Control heart rate  Has pulmonary hypertension, PAS in the 50s per echo on 1/3/2020

## 2020-04-13 NOTE — ASSESSMENT & PLAN NOTE
Not certain whether she took her medication today  Rate now up to 160-170s  Cardizem 5 mg bolus ordered in the ED  Will start cardizem infusion  Await for INR  Continue warfarin

## 2020-04-13 NOTE — ED NOTES
Pt agitated and pulling off clothing, lines, and oxygen. Attempting to calm pt and replace all lines and oxygen. Attempting to get an accurate O2 sat, fingers cold. BBS diminished, resp labored, manual rate 32. Hospitalist notified.

## 2020-04-13 NOTE — HPI
The patient is a 74-year-old female with history of CHF, end-stage renal disease - on HD, pneumonia, peripheral vascular disease, atrial fibrillation- on warfarin, hypertension, hyperlipidemia.  She presented to the ED with cough and shortness of breath. She lives with her sister, Aide. Per her other sister, Elvi Hi at [phone], she has COPD and on home O2 at 3 L NC. She continues to smoke. She appears to be confused and more shortness of breath than usual. 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 She has not complained of other pain or discomfort. Her sister does not know whether the patient has been running fever. Her other sister usually prepares her medication but not certain whether the patient has taken them today. Her sister states that the patient has chronic cough which does not appear to be worsen.         In the ED:  Afebrile  Tachycardic  Tachypneic  BP OK  WBC 5.43  H&H 11.4/38.7  K 5.2  Ferritin 877  CRP 4.25  BNP > 4500  Lactic acid 2.0  Procalcitonin 0.42  Chest radiograph: Increased opacification r hemithorax  CT head Negative  EKG, personally reviewed, atrial fibrillation, rate 107, no ST elevation

## 2020-04-13 NOTE — PROGRESS NOTES
[facility]  Progress Note    Patient Name: Griselda Neely  MRN: [id number]  Patient Class: IP- Inpatient   Admission Date: 4/12/2020  Length of Stay: 0 days  Attending Physician: David Gaytan MD  Primary Care Provider: Kalie Neely MD        Subjective:     Principal Problem:Shortness of breath        HPI:  The patient is a 74-year-old female with history of CHF, end-stage renal disease - on HD, pneumonia, peripheral vascular disease, atrial fibrillation- on warfarin, hypertension, hyperlipidemia.   She presented to the ED with cough and shortness of breath. She lives with her sister, Aide. Per her other sister, Elvi Hi at [phone], she has COPD and on home O2 at 3 L NC. She continues to smoke. She appears to be confused and more shortness of breath than usual. 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 She has not complained of other pain or discomfort. Her sister does not know whether the patient has been running fever. Her other sister usually prepares her medication but not certain whether the patient has taken them today. Her sister states that the patient has chronic cough which does not appear to be worsen.         In the ED:  Afebrile  Tachycardic  Tachypneic  BP OK  WBC 5.43  H&H 11.4/38.7  K 5.2  Ferritin 877  CRP 4.25  BNP > 4500  Lactic acid 2.0  Procalcitonin 0.42  Chest radiograph: Increased opacification r hemithorax  CT head Negative  EKG, personally reviewed, atrial fibrillation, rate 107, no ST elevation    Overview/Hospital Course:  Patient was admitted as above.  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  Cardizem drip was started for all RVR and it was later stopped because of bradycardia  Patient is planned for hemodialysis today    Interval History:    Review of Systems  Objective:     Vital Signs (Most Recent):  Temp: 97.9 °F (36.6 °C) (04/13/20  1500)  Pulse: 60 (04/13/20 1500)  Resp: (!) 26 (04/13/20 1500)  BP: (!) 123/57 (04/13/20 1500)  SpO2: (!) 94 % (04/13/20 1300) Vital Signs (24h Range):  Temp:  [97.3 °F (36.3 °C)-100 °F (37.8 °C)] 97.9 °F (36.6 °C)  Pulse:  [] 60  Resp:  [19-54] 26  SpO2:  [55 %-100 %] 94 %  BP: (120-188)/() 123/57     Weight: 59 kg (130 lb)  Body mass index is 21.63 kg/m².    Intake/Output Summary (Last 24 hours) at 4/13/2020 1641  Last data filed at 4/13/2020 0600  Gross per 24 hour   Intake 70 ml   Output 0 ml   Net 70 ml      Physical Exam    Significant Labs:   BMP:   Recent Labs   Lab 04/13/20  0530   GLU 67*   *   K 6.0*   CL 96   CO2 20*   BUN 43*   CREATININE 6.4*   CALCIUM 8.3*   MG 2.2     CBC:   Recent Labs   Lab 04/12/20  1746 04/13/20  0530   WBC 5.43 8.58   HGB 11.4* 11.8*   HCT 38.7 39.0    149*       Significant Imaging: I have reviewed all pertinent imaging results/findings within the past 24 hours.      Assessment/Plan:      * Shortness of breath    Rapid COVID-19 screen Negative  Secondary to fluid overload/COPD    Plan   Procalcitonin not elevated, no antibiotic for now  For HD today   Control heart rate  Has pulmonary hypertension, PAS in the 50s per echo on 1/3/2020          Acute on chronic combined systolic and diastolic heart failure  BNP > 4500 secondary to fluid overload with ESRD with hyperkalemia     Plan   HD today           Atrial fibrillation with RVR    Rate now up to 160-170s and later cardizem drip discontinued    Plan   Continue warfarin   Home medications        Encephalopathy  Possible secondary to fluid overload  Resolving    Plan   For HD today   On pain medication  Monitor        Hyperparathyroidism due to renal insufficiency  Chronic medical condition  Continue home medication      Chronic respiratory failure  Continue O2 supplements  Respiratory treatments  Monitor      DNR (do not resuscitate)  DNR status in the past      Anemia due to chronic kidney  disease  Stable      ESRD (end stage renal disease) on HD M,W, F   for HD today as per nephrology        HTN (hypertension)  Chronic medical condition  Continue home medication  Monitor        VTE Risk Mitigation (From admission, onward)         Ordered     warfarin (COUMADIN) tablet 2 mg  Daily      04/12/20 2044     Place sequential compression device  Until discontinued      04/12/20 2040     IP VTE HIGH RISK PATIENT  Once      04/12/20 2040                      David Gaytan MD  Department of Hospital Medicine   [facility]

## 2020-04-14 LAB
ANION GAP SERPL CALC-SCNC: 15 MMOL/L (ref 8–16)
BASOPHILS # BLD AUTO: 0.03 K/UL (ref 0–0.2)
BASOPHILS NFR BLD: 0.3 % (ref 0–1.9)
BUN SERPL-MCNC: 36 MG/DL (ref 8–23)
CALCIUM SERPL-MCNC: 8.1 MG/DL (ref 8.7–10.5)
CHLORIDE SERPL-SCNC: 95 MMOL/L (ref 95–110)
CO2 SERPL-SCNC: 26 MMOL/L (ref 23–29)
CREAT SERPL-MCNC: 5.3 MG/DL (ref 0.5–1.4)
DIFFERENTIAL METHOD: ABNORMAL
EOSINOPHIL # BLD AUTO: 0 K/UL (ref 0–0.5)
EOSINOPHIL NFR BLD: 0.2 % (ref 0–8)
ERYTHROCYTE [DISTWIDTH] IN BLOOD BY AUTOMATED COUNT: 22.1 % (ref 11.5–14.5)
EST. GFR  (AFRICAN AMERICAN): 8.6 ML/MIN/1.73 M^2
EST. GFR  (NON AFRICAN AMERICAN): 7.5 ML/MIN/1.73 M^2
GLUCOSE SERPL-MCNC: 105 MG/DL (ref 70–110)
GLUCOSE SERPL-MCNC: 62 MG/DL (ref 70–110)
GLUCOSE SERPL-MCNC: 65 MG/DL (ref 70–110)
GLUCOSE SERPL-MCNC: 66 MG/DL (ref 70–110)
GLUCOSE SERPL-MCNC: 70 MG/DL (ref 70–110)
GLUCOSE SERPL-MCNC: 70 MG/DL (ref 70–110)
GLUCOSE SERPL-MCNC: 85 MG/DL (ref 70–110)
HCT VFR BLD AUTO: 35.9 % (ref 37–48.5)
HGB BLD-MCNC: 11 G/DL (ref 12–16)
IMM GRANULOCYTES # BLD AUTO: 0.05 K/UL (ref 0–0.04)
IMM GRANULOCYTES NFR BLD AUTO: 0.5 % (ref 0–0.5)
INR PPP: 2.2
LYMPHOCYTES # BLD AUTO: 0.9 K/UL (ref 1–4.8)
LYMPHOCYTES NFR BLD: 9.6 % (ref 18–48)
MAGNESIUM SERPL-MCNC: 2.2 MG/DL (ref 1.6–2.6)
MCH RBC QN AUTO: 26.4 PG (ref 27–31)
MCHC RBC AUTO-ENTMCNC: 30.6 G/DL (ref 32–36)
MCV RBC AUTO: 86 FL (ref 82–98)
MONOCYTES # BLD AUTO: 0.9 K/UL (ref 0.3–1)
MONOCYTES NFR BLD: 9.8 % (ref 4–15)
NEUTROPHILS # BLD AUTO: 7.5 K/UL (ref 1.8–7.7)
NEUTROPHILS NFR BLD: 79.6 % (ref 38–73)
NRBC BLD-RTO: 1 /100 WBC
PLATELET # BLD AUTO: 209 K/UL (ref 150–350)
PMV BLD AUTO: 11.1 FL (ref 9.2–12.9)
POTASSIUM SERPL-SCNC: 4.8 MMOL/L (ref 3.5–5.1)
PROTHROMBIN TIME: 23.5 SEC (ref 10.6–14.8)
RBC # BLD AUTO: 4.17 M/UL (ref 4–5.4)
SARS-COV-2 RNA RESP QL NAA+PROBE: NOT DETECTED
SODIUM SERPL-SCNC: 136 MMOL/L (ref 136–145)
TROPONIN I SERPL DL<=0.01 NG/ML-MCNC: 0.04 NG/ML
WBC # BLD AUTO: 9.4 K/UL (ref 3.9–12.7)

## 2020-04-14 PROCEDURE — 25000003 PHARM REV CODE 250: Performed by: INTERNAL MEDICINE

## 2020-04-14 PROCEDURE — 84484 ASSAY OF TROPONIN QUANT: CPT

## 2020-04-14 PROCEDURE — 85610 PROTHROMBIN TIME: CPT

## 2020-04-14 PROCEDURE — 85025 COMPLETE CBC W/AUTO DIFF WBC: CPT

## 2020-04-14 PROCEDURE — 63600175 PHARM REV CODE 636 W HCPCS: Performed by: INTERNAL MEDICINE

## 2020-04-14 PROCEDURE — 83735 ASSAY OF MAGNESIUM: CPT

## 2020-04-14 PROCEDURE — 93005 ELECTROCARDIOGRAM TRACING: CPT | Performed by: INTERNAL MEDICINE

## 2020-04-14 PROCEDURE — 94640 AIRWAY INHALATION TREATMENT: CPT

## 2020-04-14 PROCEDURE — 94761 N-INVAS EAR/PLS OXIMETRY MLT: CPT

## 2020-04-14 PROCEDURE — 80048 BASIC METABOLIC PNL TOTAL CA: CPT

## 2020-04-14 PROCEDURE — 25000003 PHARM REV CODE 250: Performed by: NURSE PRACTITIONER

## 2020-04-14 PROCEDURE — 99900035 HC TECH TIME PER 15 MIN (STAT)

## 2020-04-14 PROCEDURE — 21000000 HC CCU ICU ROOM CHARGE

## 2020-04-14 PROCEDURE — 36415 COLL VENOUS BLD VENIPUNCTURE: CPT

## 2020-04-14 PROCEDURE — 25000242 PHARM REV CODE 250 ALT 637 W/ HCPCS: Performed by: INTERNAL MEDICINE

## 2020-04-14 RX ORDER — HALOPERIDOL 5 MG/ML
5 INJECTION INTRAMUSCULAR ONCE
Status: COMPLETED | OUTPATIENT
Start: 2020-04-14 | End: 2020-04-14

## 2020-04-14 RX ORDER — ALBUTEROL SULFATE 90 UG/1
2 AEROSOL, METERED RESPIRATORY (INHALATION) 2 TIMES DAILY
Status: DISCONTINUED | OUTPATIENT
Start: 2020-04-14 | End: 2020-04-16 | Stop reason: HOSPADM

## 2020-04-14 RX ORDER — OXYCODONE AND ACETAMINOPHEN 5; 325 MG/1; MG/1
1 TABLET ORAL EVERY 8 HOURS PRN
Status: DISCONTINUED | OUTPATIENT
Start: 2020-04-14 | End: 2020-04-16 | Stop reason: HOSPADM

## 2020-04-14 RX ADMIN — MAGNESIUM OXIDE 400 MG: 400 TABLET ORAL at 08:04

## 2020-04-14 RX ADMIN — HALOPERIDOL LACTATE 5 MG: 5 INJECTION, SOLUTION INTRAMUSCULAR at 05:04

## 2020-04-14 RX ADMIN — METOPROLOL SUCCINATE 50 MG: 25 TABLET, FILM COATED, EXTENDED RELEASE ORAL at 08:04

## 2020-04-14 RX ADMIN — CALCIUM ACETATE 667 MG: 667 CAPSULE ORAL at 08:04

## 2020-04-14 RX ADMIN — DILTIAZEM HYDROCHLORIDE 180 MG: 180 CAPSULE, COATED, EXTENDED RELEASE ORAL at 08:04

## 2020-04-14 RX ADMIN — ALBUTEROL SULFATE 2 PUFF: 90 AEROSOL, METERED RESPIRATORY (INHALATION) at 09:04

## 2020-04-14 RX ADMIN — LOSARTAN POTASSIUM 25 MG: 25 TABLET, FILM COATED ORAL at 08:04

## 2020-04-14 RX ADMIN — MORPHINE SULFATE 2 MG: 2 INJECTION, SOLUTION INTRAMUSCULAR; INTRAVENOUS at 02:04

## 2020-04-14 RX ADMIN — MORPHINE SULFATE 2 MG: 2 INJECTION, SOLUTION INTRAMUSCULAR; INTRAVENOUS at 08:04

## 2020-04-14 RX ADMIN — DEXTROSE 1000 ML: 5 SOLUTION INTRAVENOUS at 05:04

## 2020-04-14 RX ADMIN — ASPIRIN 81 MG: 81 TABLET, DELAYED RELEASE ORAL at 08:04

## 2020-04-14 RX ADMIN — DILTIAZEM HYDROCHLORIDE 5 MG/HR: 5 INJECTION INTRAVENOUS at 07:04

## 2020-04-14 RX ADMIN — OXYCODONE HYDROCHLORIDE AND ACETAMINOPHEN 1 TABLET: 5; 325 TABLET ORAL at 11:04

## 2020-04-14 RX ADMIN — WARFARIN SODIUM 2 MG: 1 TABLET ORAL at 05:04

## 2020-04-14 RX ADMIN — ISOSORBIDE MONONITRATE 30 MG: 30 TABLET, EXTENDED RELEASE ORAL at 08:04

## 2020-04-14 NOTE — PROGRESS NOTES
Pt with a glucose of 60 with poor po intake. She is a dialysis patient with moderate metabolic encephalopathy who is at risk for hypoglycemia . Plan: D 5 W at 50 cc per hour. Pt is not in volume overload at present. POCT glucose Q 4. Hypoglycemia protocol ordered

## 2020-04-14 NOTE — PLAN OF CARE
04/14/20 0949   Discharge Assessment   Assessment Type Discharge Planning Assessment   Confirmed/corrected address and phone number on facesheet? Yes   Assessment information obtained from? Patient   Expected Length of Stay (days) 3   Communicated expected length of stay with patient/caregiver yes   Prior to hospitilization cognitive status: Alert/Oriented   Prior to hospitalization functional status: Independent   Current cognitive status: Alert/Oriented   Facility Arrived From: home   Lives With sibling(s)   Able to Return to Prior Arrangements yes   Is patient able to care for self after discharge? Unable to determine at this time (comments)   Who are your caregiver(s) and their phone number(s)? Ms. Watson   Patient's perception of discharge disposition home health   Readmission Within the Last 30 Days no previous admission in last 30 days   Patient currently being followed by outpatient case management? No   Patient currently receives any other outside agency services? No   Equipment Currently Used at Home none   Do you have any problems affording any of your prescribed medications? No   Is the patient taking medications as prescribed? yes   Does the patient have transportation home? Yes   Transportation Anticipated agency;family or friend will provide   Dialysis Name and Scheduled days [facility]   Does the patient receive services at the [facility]? Yes   Discharge Plan A Home   Discharge Plan B Home   Patient/Family in Agreement with Plan yes     Pt sister, Ms. Watson provided information.

## 2020-04-14 NOTE — ASSESSMENT & PLAN NOTE
Possible secondary to fluid overload  Resolving    Plan   For HD today   On pain medication  Monitor    4/14/2020  Resolving and likely multifactorial in origin

## 2020-04-14 NOTE — NURSING
Pt very agitated. Cardizem drip started for hr. Ativan given per floor rn.  aware of pt vss Ordered to shorten tx to 2 hours.

## 2020-04-14 NOTE — NURSING
20:36 Notified Dr. Carlson pt agitated and yelling, Cardizem restarted due to heart rate 120's-130's, while receiving dialysis. Orders for 1 time dose ativan in.  21:32 Notified  Pt still yelling and agitated, 1 time dose of haldol ordered.

## 2020-04-14 NOTE — PROGRESS NOTES
[facility]  Progress Note    Patient Name: Griselda Neely  MRN: [id number]  Patient Class: IP- Inpatient   Admission Date: 4/12/2020  Length of Stay: 1 days  Attending Physician: David Gaytan MD  Primary Care Provider: Kalie Neely MD        Subjective:     Principal Problem:Shortness of breath        HPI:  The patient is a 74-year-old female with history of CHF, end-stage renal disease - on HD, pneumonia, peripheral vascular disease, atrial fibrillation- on warfarin, hypertension, hyperlipidemia.   She presented to the ED with cough and shortness of breath. She lives with her sister, Aide. Per her other sister, Elvi Hi at [phone], she has COPD and on home O2 at 3 L NC. She continues to smoke. She appears to be confused and more shortness of breath than usual. 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 She has not complained of other pain or discomfort. Her sister does not know whether the patient has been running fever. Her other sister usually prepares her medication but not certain whether the patient has taken them today. Her sister states that the patient has chronic cough which does not appear to be worsen.         In the ED:  Afebrile  Tachycardic  Tachypneic  BP OK  WBC 5.43  H&H 11.4/38.7  K 5.2  Ferritin 877  CRP 4.25  BNP > 4500  Lactic acid 2.0  Procalcitonin 0.42  Chest radiograph: Increased opacification r hemithorax  CT head Negative  EKG, personally reviewed, atrial fibrillation, rate 107, no ST elevation    Overview/Hospital Course:  Patient was admitted as above.  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  Cardizem drip was started for all RVR and it was later stopped because of bradycardia  Patient is planned for hemodialysis today    4/14/2020  Pt was very upset this AM, appeared confused and spoke of pain but could not localize the pain. She had multiple  medications last PM including Haldol.S  Multiple tests ordered:  CT head without contrast-no acute finding  EKG-afib with RVR pt is on cardizem  ABG-patient refused  Assessment:  Afib with RVR off cardizem drip with 180 mg PO Q AM and heart rate   Metabolic encephalopathy-resolving  PLAN:reduce oxycodone to 5/325 mg Q 6 prn pain    Interval History: encephalopathy resolving , pt now sleeping    Review of Systems   Unable to perform ROS: Acuity of condition     Objective:     Vital Signs (Most Recent):  Temp: 97 °F (36.1 °C) (04/14/20 1100)  Pulse: 87 (04/14/20 1100)  Resp: 18 (04/14/20 1039)  BP: 112/83 (04/14/20 1100)  SpO2: 100 % (04/14/20 1100) Vital Signs (24h Range):  Temp:  [95.6 °F (35.3 °C)-97.8 °F (36.6 °C)] 97 °F (36.1 °C)  Pulse:  [] 87  Resp:  [17-94] 18  SpO2:  [80 %-100 %] 100 %  BP: (112-172)/() 112/83     Weight: 59.3 kg (130 lb 11.2 oz)  Body mass index is 21.75 kg/m².  No intake or output data in the 24 hours ending 04/14/20 1551   Physical Exam   Constitutional: She is oriented to person, place, and time. No distress.   HENT:   Head: Normocephalic and atraumatic.   Eyes: Pupils are equal, round, and reactive to light.   Neck: Normal range of motion. Neck supple.   Cardiovascular: Normal rate. Exam reveals gallop.   Pulmonary/Chest: She has rales.   Abdominal: Soft. Bowel sounds are normal.   Musculoskeletal: Normal range of motion.   Neurological: She is alert and oriented to person, place, and time.   Skin: Skin is warm. She is diaphoretic.   Psychiatric:   difficulty concentrating and maintaining focus       Significant Labs:   Blood Culture:   Recent Labs   Lab 04/12/20  1747   LABBLOO No Growth to date  No Growth to date  No Growth to date  No Growth to date     BMP:   Recent Labs   Lab 04/14/20  0526   GLU 70      K 4.8   CL 95   CO2 26   BUN 36*   CREATININE 5.3*   CALCIUM 8.1*   MG 2.2     CBC:   Recent Labs   Lab 04/12/20  1746 04/13/20  0530 04/14/20  0526   WBC  5.43 8.58 9.40   HGB 11.4* 11.8* 11.0*   HCT 38.7 39.0 35.9*    149* 209     CMP:   Recent Labs   Lab 04/12/20  1746 04/13/20  0530 04/14/20  0526   * 133* 136   K 5.2* 6.0* 4.8   CL 90* 96 95   CO2 27 20* 26   GLU 83 67* 70   BUN 41* 43* 36*   CREATININE 6.3* 6.4* 5.3*   CALCIUM 8.6* 8.3* 8.1*   PROT 8.9*  --   --    ALBUMIN 3.2*  --   --    BILITOT 0.9  --   --    ALKPHOS 139*  --   --    AST 16  --   --    ALT 10  --   --    ANIONGAP 16 17* 15   EGFRNONAA 6.1* 5.9* 7.5*     Cardiac Markers:   Recent Labs   Lab 04/12/20 1746   BNP >4,500*     Urine Culture: No results for input(s): LABURIN in the last 48 hours.    Significant Imaging: I have reviewed all pertinent imaging results/findings within the past 24 hours.      Assessment/Plan:      * Shortness of breath    Rapid COVID-19 screen Negative  Secondary to fluid overload/COPD    Plan   Procalcitonin not elevated, no antibiotic for now  For HD today   Control heart rate  Has pulmonary hypertension, PAS in the 50s per echo on 1/3/2020          Acute on chronic combined systolic and diastolic heart failure  BNP > 4500 secondary to fluid overload with ESRD with hyperkalemia     Plan   HD today           Atrial fibrillation with RVR    Rate now up to 160-170s and later cardizem drip discontinued    Plan   Continue warfarin   Home medications    4/14/2020  Much improved off the cardizem drip.    Encephalopathy  Possible secondary to fluid overload  Resolving    Plan   For HD today   On pain medication  Monitor    4/14/2020  Resolving and likely multifactorial in origin        Hyperparathyroidism due to renal insufficiency  Chronic medical condition  Continue home medication      Chronic respiratory failure  Continue O2 supplements  Respiratory treatments  Monitor      DNR (do not resuscitate)  DNR status in the past      Anemia due to chronic kidney disease  Stable      ESRD (end stage renal disease) on HD M,W, F   for HD today as per  nephrology        HTN (hypertension)  Chronic medical condition  Continue home medication  Monitor        VTE Risk Mitigation (From admission, onward)         Ordered     warfarin (COUMADIN) tablet 2 mg  Daily      04/12/20 2044     Place sequential compression device  Until discontinued      04/12/20 2040     IP VTE HIGH RISK PATIENT  Once      04/12/20 2040                      Chalino De Leon MD  Department of Hospital Medicine   [facility]

## 2020-04-14 NOTE — NURSING
[facility]'s [facility] restlessness and confusion. MD to place new orders. Will continue to update and monitor accordingly.

## 2020-04-14 NOTE — NURSING
HD complete. Total . Pt still very agitated. Pt reinfused per policy. Removed hd needles x 2. Gauze dressing secured with tape. Light pressure held per policy. Hemostasis achieved. Report given to Romina Worley RN.

## 2020-04-14 NOTE — SUBJECTIVE & OBJECTIVE
Interval History: encephalopathy resolving , pt now sleeping    Review of Systems   Unable to perform ROS: Acuity of condition     Objective:     Vital Signs (Most Recent):  Temp: 97 °F (36.1 °C) (04/14/20 1100)  Pulse: 87 (04/14/20 1100)  Resp: 18 (04/14/20 1039)  BP: 112/83 (04/14/20 1100)  SpO2: 100 % (04/14/20 1100) Vital Signs (24h Range):  Temp:  [95.6 °F (35.3 °C)-97.8 °F (36.6 °C)] 97 °F (36.1 °C)  Pulse:  [] 87  Resp:  [17-94] 18  SpO2:  [80 %-100 %] 100 %  BP: (112-172)/() 112/83     Weight: 59.3 kg (130 lb 11.2 oz)  Body mass index is 21.75 kg/m².  No intake or output data in the 24 hours ending 04/14/20 1551   Physical Exam   Constitutional: She is oriented to person, place, and time. No distress.   HENT:   Head: Normocephalic and atraumatic.   Eyes: Pupils are equal, round, and reactive to light.   Neck: Normal range of motion. Neck supple.   Cardiovascular: Normal rate. Exam reveals gallop.   Pulmonary/Chest: She has rales.   Abdominal: Soft. Bowel sounds are normal.   Musculoskeletal: Normal range of motion.   Neurological: She is alert and oriented to person, place, and time.   Skin: Skin is warm. She is diaphoretic.   Psychiatric:   difficulty concentrating and maintaining focus       Significant Labs:   Blood Culture:   Recent Labs   Lab 04/12/20 1747   LABBLOO No Growth to date  No Growth to date  No Growth to date  No Growth to date     BMP:   Recent Labs   Lab 04/14/20  0526   GLU 70      K 4.8   CL 95   CO2 26   BUN 36*   CREATININE 5.3*   CALCIUM 8.1*   MG 2.2     CBC:   Recent Labs   Lab 04/12/20 1746 04/13/20  0530 04/14/20  0526   WBC 5.43 8.58 9.40   HGB 11.4* 11.8* 11.0*   HCT 38.7 39.0 35.9*    149* 209     CMP:   Recent Labs   Lab 04/12/20 1746 04/13/20  0530 04/14/20  0526   * 133* 136   K 5.2* 6.0* 4.8   CL 90* 96 95   CO2 27 20* 26   GLU 83 67* 70   BUN 41* 43* 36*   CREATININE 6.3* 6.4* 5.3*   CALCIUM 8.6* 8.3* 8.1*   PROT 8.9*  --   --     ALBUMIN 3.2*  --   --    BILITOT 0.9  --   --    ALKPHOS 139*  --   --    AST 16  --   --    ALT 10  --   --    ANIONGAP 16 17* 15   EGFRNONAA 6.1* 5.9* 7.5*     Cardiac Markers:   Recent Labs   Lab 04/12/20  1746   BNP >4,500*     Urine Culture: No results for input(s): LABURIN in the last 48 hours.    Significant Imaging: I have reviewed all pertinent imaging results/findings within the past 24 hours.

## 2020-04-14 NOTE — ASSESSMENT & PLAN NOTE
Rate now up to 160-170s and later cardizem drip discontinued    Plan   Continue warfarin   Home medications    4/14/2020  Much improved off the cardizem drip.

## 2020-04-14 NOTE — NURSING
74 yr old female   Well know to inpt wound care.    Pt refusing most care at this time. Nursing was able to get picture yesterday.      Will continue with regular inpt wound care.    Clean with chlohrexidine/ns  pat dry  cover open wound with aquacel ag, adaptic abd pad, kerlex and paper tape. Or cover with adaptic and mepilex is she will allow.

## 2020-04-15 LAB
ANION GAP SERPL CALC-SCNC: 16 MMOL/L (ref 8–16)
BASOPHILS # BLD AUTO: 0.01 K/UL (ref 0–0.2)
BASOPHILS NFR BLD: 0.2 % (ref 0–1.9)
BUN SERPL-MCNC: 42 MG/DL (ref 8–23)
CALCIUM SERPL-MCNC: 7.8 MG/DL (ref 8.7–10.5)
CHLORIDE SERPL-SCNC: 91 MMOL/L (ref 95–110)
CO2 SERPL-SCNC: 24 MMOL/L (ref 23–29)
CREAT SERPL-MCNC: 5.9 MG/DL (ref 0.5–1.4)
DIFFERENTIAL METHOD: ABNORMAL
EOSINOPHIL # BLD AUTO: 0.1 K/UL (ref 0–0.5)
EOSINOPHIL NFR BLD: 0.8 % (ref 0–8)
ERYTHROCYTE [DISTWIDTH] IN BLOOD BY AUTOMATED COUNT: 22.3 % (ref 11.5–14.5)
EST. GFR  (AFRICAN AMERICAN): 7.6 ML/MIN/1.73 M^2
EST. GFR  (NON AFRICAN AMERICAN): 6.6 ML/MIN/1.73 M^2
GLUCOSE SERPL-MCNC: 106 MG/DL (ref 70–110)
GLUCOSE SERPL-MCNC: 90 MG/DL (ref 70–110)
GLUCOSE SERPL-MCNC: 92 MG/DL (ref 70–110)
GLUCOSE SERPL-MCNC: 93 MG/DL (ref 70–110)
GLUCOSE SERPL-MCNC: 98 MG/DL (ref 70–110)
HAV IGM SERPL QL IA: NEGATIVE
HBV CORE IGM SERPL QL IA: NEGATIVE
HBV SURFACE AG SERPL QL IA: NEGATIVE
HCT VFR BLD AUTO: 34.6 % (ref 37–48.5)
HCV AB S/CO SERPL IA: <0.1 S/CO RATIO (ref 0–0.9)
HGB BLD-MCNC: 10.4 G/DL (ref 12–16)
IMM GRANULOCYTES # BLD AUTO: 0.02 K/UL (ref 0–0.04)
IMM GRANULOCYTES NFR BLD AUTO: 0.3 % (ref 0–0.5)
LYMPHOCYTES # BLD AUTO: 0.8 K/UL (ref 1–4.8)
LYMPHOCYTES NFR BLD: 12.2 % (ref 18–48)
MAGNESIUM SERPL-MCNC: 2.2 MG/DL (ref 1.6–2.6)
MCH RBC QN AUTO: 26.2 PG (ref 27–31)
MCHC RBC AUTO-ENTMCNC: 30.1 G/DL (ref 32–36)
MCV RBC AUTO: 87 FL (ref 82–98)
MONOCYTES # BLD AUTO: 0.8 K/UL (ref 0.3–1)
MONOCYTES NFR BLD: 11.9 % (ref 4–15)
NEUTROPHILS # BLD AUTO: 5 K/UL (ref 1.8–7.7)
NEUTROPHILS NFR BLD: 74.6 % (ref 38–73)
NRBC BLD-RTO: 1 /100 WBC
PLATELET # BLD AUTO: 204 K/UL (ref 150–350)
PMV BLD AUTO: 11 FL (ref 9.2–12.9)
POTASSIUM SERPL-SCNC: 4.7 MMOL/L (ref 3.5–5.1)
RBC # BLD AUTO: 3.97 M/UL (ref 4–5.4)
SODIUM SERPL-SCNC: 131 MMOL/L (ref 136–145)
WBC # BLD AUTO: 6.63 K/UL (ref 3.9–12.7)

## 2020-04-15 PROCEDURE — 25000003 PHARM REV CODE 250: Performed by: INTERNAL MEDICINE

## 2020-04-15 PROCEDURE — 27000221 HC OXYGEN, UP TO 24 HOURS

## 2020-04-15 PROCEDURE — 63600175 PHARM REV CODE 636 W HCPCS: Performed by: INTERNAL MEDICINE

## 2020-04-15 PROCEDURE — 85025 COMPLETE CBC W/AUTO DIFF WBC: CPT

## 2020-04-15 PROCEDURE — 25000003 PHARM REV CODE 250: Performed by: NURSE PRACTITIONER

## 2020-04-15 PROCEDURE — 21000000 HC CCU ICU ROOM CHARGE

## 2020-04-15 PROCEDURE — 80048 BASIC METABOLIC PNL TOTAL CA: CPT

## 2020-04-15 PROCEDURE — 94640 AIRWAY INHALATION TREATMENT: CPT

## 2020-04-15 PROCEDURE — 83735 ASSAY OF MAGNESIUM: CPT

## 2020-04-15 PROCEDURE — 90935 HEMODIALYSIS ONE EVALUATION: CPT

## 2020-04-15 PROCEDURE — 94761 N-INVAS EAR/PLS OXIMETRY MLT: CPT

## 2020-04-15 PROCEDURE — 36415 COLL VENOUS BLD VENIPUNCTURE: CPT

## 2020-04-15 RX ADMIN — CALCIUM ACETATE 667 MG: 667 CAPSULE ORAL at 05:04

## 2020-04-15 RX ADMIN — ALBUTEROL SULFATE 2 PUFF: 90 AEROSOL, METERED RESPIRATORY (INHALATION) at 09:04

## 2020-04-15 RX ADMIN — WARFARIN SODIUM 2 MG: 1 TABLET ORAL at 05:04

## 2020-04-15 RX ADMIN — ALBUTEROL SULFATE 2 PUFF: 90 AEROSOL, METERED RESPIRATORY (INHALATION) at 07:04

## 2020-04-15 RX ADMIN — DILTIAZEM HYDROCHLORIDE 180 MG: 180 CAPSULE, COATED, EXTENDED RELEASE ORAL at 08:04

## 2020-04-15 RX ADMIN — LOSARTAN POTASSIUM 25 MG: 25 TABLET, FILM COATED ORAL at 08:04

## 2020-04-15 RX ADMIN — MORPHINE SULFATE 2 MG: 2 INJECTION, SOLUTION INTRAMUSCULAR; INTRAVENOUS at 01:04

## 2020-04-15 RX ADMIN — OXYCODONE HYDROCHLORIDE AND ACETAMINOPHEN 1 TABLET: 5; 325 TABLET ORAL at 08:04

## 2020-04-15 RX ADMIN — MAGNESIUM OXIDE 400 MG: 400 TABLET ORAL at 08:04

## 2020-04-15 RX ADMIN — ISOSORBIDE MONONITRATE 30 MG: 30 TABLET, EXTENDED RELEASE ORAL at 08:04

## 2020-04-15 RX ADMIN — METOPROLOL SUCCINATE 50 MG: 25 TABLET, FILM COATED, EXTENDED RELEASE ORAL at 08:04

## 2020-04-15 RX ADMIN — ASPIRIN 81 MG: 81 TABLET, DELAYED RELEASE ORAL at 08:04

## 2020-04-15 NOTE — SUBJECTIVE & OBJECTIVE
Interval History: Pt is much more alert and awake-I do not want what you are doing for me.    Review of Systems   Reason unable to perform ROS: refused to answer questions.     Objective:     Vital Signs (Most Recent):  Temp: 97.3 °F (36.3 °C) (04/15/20 0800)  Pulse: 92 (04/15/20 0800)  Resp: 16 (04/15/20 0800)  BP: (!) 149/86 (04/15/20 0800)  SpO2: 98 % (04/15/20 0800) Vital Signs (24h Range):  Temp:  [97 °F (36.1 °C)-98.4 °F (36.9 °C)] 97.3 °F (36.3 °C)  Pulse:  [] 92  Resp:  [16-78] 16  SpO2:  [81 %-100 %] 98 %  BP: (112-149)/(58-86) 149/86     Weight: 59.3 kg (130 lb 11.2 oz)  Body mass index is 21.75 kg/m².  No intake or output data in the 24 hours ending 04/15/20 0834   Physical Exam   Constitutional: No distress.   HENT:   Head: Normocephalic and atraumatic.   Eyes: Pupils are equal, round, and reactive to light.   Neck: Normal range of motion. Neck supple.   Cardiovascular: Normal rate.   Pulmonary/Chest: Effort normal.   Abdominal: Soft. Bowel sounds are normal.   Musculoskeletal:   Pt would not cooperate with the exam   Neurological:   Pt would not cooperate with the exam   Skin: She is not diaphoretic.   Psychiatric:   Pt would not cooperate with the exam       Significant Labs:   BMP:   Recent Labs   Lab 04/15/20  0321   GLU 98   *   K 4.7   CL 91*   CO2 24   BUN 42*   CREATININE 5.9*   CALCIUM 7.8*   MG 2.2     CBC:   Recent Labs   Lab 04/14/20  0526 04/15/20  0321   WBC 9.40 6.63   HGB 11.0* 10.4*   HCT 35.9* 34.6*    204     CMP:   Recent Labs   Lab 04/14/20  0526 04/15/20  0321    131*   K 4.8 4.7   CL 95 91*   CO2 26 24   GLU 70 98   BUN 36* 42*   CREATININE 5.3* 5.9*   CALCIUM 8.1* 7.8*   ANIONGAP 15 16   EGFRNONAA 7.5* 6.6*       Significant Imaging: I have reviewed all pertinent imaging results/findings within the past 24 hours.

## 2020-04-15 NOTE — PLAN OF CARE
04/15/20 0718   Patient Assessment/Suction   Level of Consciousness (AVPU) alert   Respiratory Effort Unlabored   Expansion/Accessory Muscles/Retractions no use of accessory muscles   All Lung Fields Breath Sounds coarse   Rhythm/Pattern, Respiratory no shortness of breath reported   Cough Frequency infrequent   Cough Type congested   PRE-TX-O2   O2 Device (Oxygen Therapy) Oxymask   $ Is the patient on Low Flow Oxygen? Yes   Flow (L/min) 6  (decreased to 5)   SpO2 100 %   Pulse Oximetry Type Continuous   $ Pulse Oximetry - Multiple Charge Pulse Oximetry - Multiple   Pulse 86   Resp 18   Inhaler   $ Inhaler Charges MDI (Metered Dose Inahler) Treatment   Respiratory Treatment Status (Inhaler) given   Treatment Route (Inhaler) mouthpiece   Patient Position (Inhaler) sitting in chair   Post Treatment Assessment (Inhaler) breath sounds unchanged   Signs of Intolerance (Inhaler) none

## 2020-04-15 NOTE — PROGRESS NOTES
Progress Note  [facility]    Admit Date: 4/12/2020   LOS: 1 day      TELEMEDICINE VISIT    Reason for Consult: ESRD  Attending Physician: Dr. Gaytan    Telemedicine visit with the assistance of the nurse(s) caring for the patient.  Below is the documentation of the visit where I have personally visualized or heard information or I have noted that the nurse told me of information regarding the patient.  30 minute visit.        SUBJECTIVE:     Follow-up For:  ESRD    Review of Systems:  Patient confused and unable to be questioned.    OBJECTIVE:     Vital Signs (Most Recent)  Temp: 97.7 °F (36.5 °C) (04/14/20 2313)  Pulse: 82 (04/14/20 2313)  Resp: (!) 21 (04/14/20 2313)  BP: 113/79 (04/14/20 2313)  SpO2: (!) 91 % (04/14/20 2303)    Vital Signs Range (Last 24H):  Temp:  [95.6 °F (35.3 °C)-98.4 °F (36.9 °C)]   Pulse:  []   Resp:  [17-94]   BP: (112-162)/(58-87)   SpO2:  [80 %-100 %]     No intake/output data recorded.    Physical Exam:   Telemedicine visit. Confused. Trace edema.    Laboratory:  Recent Labs   Lab 04/14/20  0526   HGB 11.0*   HCT 35.9*   WBC 9.40          Recent Labs   Lab 04/12/20  1746 04/13/20  0530 04/14/20  0526   * 133* 136   K 5.2* 6.0* 4.8   CL 90* 96 95   CO2 27 20* 26   BUN 41* 43* 36*   CREATININE 6.3* 6.4* 5.3*   GLU 83 67* 70   CALCIUM 8.6* 8.3* 8.1*   CPK 35  --   --        Lab Results   Component Value Date    PTH 6.4 (L) 08/09/2019    CALCIUM 8.1 (L) 04/14/2020    CAION 0.84 (L) 07/26/2017    PHOS 4.2 03/19/2020       Lab Results   Component Value Date    URICACID 4.0 03/17/2020       BNP  Recent Labs   Lab 04/12/20  1746   BNP >4,500*     EXAMINATION:  XR CHEST AP PORTABLE    CLINICAL HISTORY:  chest pain;    FINDINGS:  Portable chest at 10:21 is compared to prior study 04/12/2020 shows stable cardiomegaly there is a prosthetic heart valve.    There is improvement of the alveolar opacities in the right mid and lower lung.  There is tiny right  pleural effusion.  There is progression of the airspace disease in the left lung base.  Differential includes edema versus pneumonia.  The left upper lobe is clear.  There are no acute osseous abnormalities.      Impression       Stable moderate cardiomegaly    Improvement of the airspace disease in the right mid and lower lung with progression of the airspace disease in left lung base.  There tiny pleural effusions.  Differential includes edema versus pneumonia      Electronically signed by: Keily Hernandez MD  Date: 04/14/2020  Time: 10:50         Problem List:    Active Hospital Problems    Diagnosis  POA    *Shortness of breath [R06.02]  Yes    Acute on chronic combined systolic and diastolic heart failure [I50.43]  Yes    Atrial fibrillation with RVR [I48.91]  Yes    Encephalopathy [G93.40]  Unknown    DNR (do not resuscitate) [Z66]  Yes     Chronic    Encephalopathy, metabolic [G93.41]  Unknown    Chronic respiratory failure [J96.10]  Yes     Chronic     3 L O2 at home      Anemia due to chronic kidney disease [N18.9, D63.1]  Yes     Likely of chronic disease.       Hyperparathyroidism due to renal insufficiency [N25.81]  Yes    ESRD (end stage renal disease) on HD M,W, F [N18.6]  Yes     Chronic    HTN (hypertension) [I10]  Yes     Chronic      Resolved Hospital Problems    Diagnosis Date Resolved POA    Suspected Covid-19 Virus Infection [R68.89] 04/13/2020 Yes       Nephrology Assessment/Plan:    1.  Shortness of breath  Volume improved after dialysis  Chest x-ray also better  Defer to hospital medicine for antibiotic selection     2.  Hyperkalemia  Improved after the use of a low K dialysate  Monitor serum potassium     3.  COPD  The patient still smokes and certainly there is a component of COPD  Defer to hospital medicine and pulmonary     4.  Calciphylaxis  She has the left leg wound which causes her severe pain  Have wound care evaluate it  Give sodium thiosulfate with dialysis     5.   Anemia  She is at her ESRD goal  No need for DONNY's     6.  A. Fib with RVR  Being treated with Cardizem  Defer to cardiology and hospital medicine     Joseph Ingram M.D.

## 2020-04-15 NOTE — ASSESSMENT & PLAN NOTE
for HD today as per nephrology    4/15/2020  Pt has per her sister had a progressive decline as dialysis has continued. Her sister is elderly and unable to pick her up after frequent falls. She would not cooperate with PT during a previous SNF placement. I have spoken to both of her sisters who will speak to her about NHP, along with her son who lives out of state. I have consulted PT, OT and -for nursing home placement

## 2020-04-15 NOTE — PLAN OF CARE
Vital signs, cardiac, and lab monitoring. IIncrease activity as tolerated. Bed alarm on. Watch for falls. Watch for signs and symptoms of bleeding.  Accuchecks per order. Breathing treatments and adjust oxygen as needed.Strict I&O. Daily weights.

## 2020-04-15 NOTE — PLAN OF CARE
04/15/20 0958   Discharge Reassessment   Assessment Type Discharge Planning Reassessment   Anticipated Discharge Disposition Home   Discharge Plan A New Nursing Home placement - care home care facility   Discharge Plan B Home with family;Home Health   DME Needed Upon Discharge  other (see comments)  (hospital bed)   Post-Acute Status   Discharge Delays None known at this time     1000 Spoke with patient's sister concerning nursing home placement. Per sister and patient, nursing home placement will not be need. Patient refuses going to nursing home and sister agreed that this is not what family wants.

## 2020-04-15 NOTE — NURSING
Pt removed peripheral IV. Refusing to allow RN to start IV x3 attempts. MD notified. Will continue to update with any changes and request pt to allow insertion attempts.DNR status and functioning fistula in place.

## 2020-04-16 VITALS
WEIGHT: 130.69 LBS | BODY MASS INDEX: 21.77 KG/M2 | RESPIRATION RATE: 20 BRPM | HEIGHT: 65 IN | HEART RATE: 108 BPM | OXYGEN SATURATION: 95 % | SYSTOLIC BLOOD PRESSURE: 137 MMHG | DIASTOLIC BLOOD PRESSURE: 85 MMHG | TEMPERATURE: 98 F

## 2020-04-16 LAB
ANION GAP SERPL CALC-SCNC: 18 MMOL/L (ref 8–16)
BASOPHILS # BLD AUTO: 0.02 K/UL (ref 0–0.2)
BASOPHILS NFR BLD: 0.3 % (ref 0–1.9)
BUN SERPL-MCNC: 24 MG/DL (ref 8–23)
CALCIUM SERPL-MCNC: 8.3 MG/DL (ref 8.7–10.5)
CHLORIDE SERPL-SCNC: 91 MMOL/L (ref 95–110)
CO2 SERPL-SCNC: 26 MMOL/L (ref 23–29)
CREAT SERPL-MCNC: 4.4 MG/DL (ref 0.5–1.4)
DIFFERENTIAL METHOD: ABNORMAL
EOSINOPHIL # BLD AUTO: 0 K/UL (ref 0–0.5)
EOSINOPHIL NFR BLD: 0.6 % (ref 0–8)
ERYTHROCYTE [DISTWIDTH] IN BLOOD BY AUTOMATED COUNT: 22.7 % (ref 11.5–14.5)
EST. GFR  (AFRICAN AMERICAN): 10.8 ML/MIN/1.73 M^2
EST. GFR  (NON AFRICAN AMERICAN): 9.3 ML/MIN/1.73 M^2
GLUCOSE SERPL-MCNC: 109 MG/DL (ref 70–110)
GLUCOSE SERPL-MCNC: 92 MG/DL (ref 70–110)
HCT VFR BLD AUTO: 36.8 % (ref 37–48.5)
HGB BLD-MCNC: 11 G/DL (ref 12–16)
IMM GRANULOCYTES # BLD AUTO: 0.03 K/UL (ref 0–0.04)
IMM GRANULOCYTES NFR BLD AUTO: 0.4 % (ref 0–0.5)
LYMPHOCYTES # BLD AUTO: 0.6 K/UL (ref 1–4.8)
LYMPHOCYTES NFR BLD: 8.2 % (ref 18–48)
MAGNESIUM SERPL-MCNC: 2.1 MG/DL (ref 1.6–2.6)
MCH RBC QN AUTO: 26.3 PG (ref 27–31)
MCHC RBC AUTO-ENTMCNC: 29.9 G/DL (ref 32–36)
MCV RBC AUTO: 88 FL (ref 82–98)
MONOCYTES # BLD AUTO: 0.7 K/UL (ref 0.3–1)
MONOCYTES NFR BLD: 10 % (ref 4–15)
NEUTROPHILS # BLD AUTO: 5.6 K/UL (ref 1.8–7.7)
NEUTROPHILS NFR BLD: 80.5 % (ref 38–73)
NRBC BLD-RTO: 1 /100 WBC
PLATELET # BLD AUTO: 187 K/UL (ref 150–350)
PMV BLD AUTO: 10.1 FL (ref 9.2–12.9)
POTASSIUM SERPL-SCNC: 4.1 MMOL/L (ref 3.5–5.1)
RBC # BLD AUTO: 4.19 M/UL (ref 4–5.4)
SODIUM SERPL-SCNC: 135 MMOL/L (ref 136–145)
WBC # BLD AUTO: 6.92 K/UL (ref 3.9–12.7)

## 2020-04-16 PROCEDURE — 83735 ASSAY OF MAGNESIUM: CPT

## 2020-04-16 PROCEDURE — 94761 N-INVAS EAR/PLS OXIMETRY MLT: CPT

## 2020-04-16 PROCEDURE — 97530 THERAPEUTIC ACTIVITIES: CPT

## 2020-04-16 PROCEDURE — 25000003 PHARM REV CODE 250: Performed by: INTERNAL MEDICINE

## 2020-04-16 PROCEDURE — 36415 COLL VENOUS BLD VENIPUNCTURE: CPT

## 2020-04-16 PROCEDURE — 85025 COMPLETE CBC W/AUTO DIFF WBC: CPT

## 2020-04-16 PROCEDURE — 97163 PT EVAL HIGH COMPLEX 45 MIN: CPT

## 2020-04-16 PROCEDURE — 94640 AIRWAY INHALATION TREATMENT: CPT

## 2020-04-16 PROCEDURE — 25000003 PHARM REV CODE 250: Performed by: NURSE PRACTITIONER

## 2020-04-16 PROCEDURE — 27000221 HC OXYGEN, UP TO 24 HOURS

## 2020-04-16 PROCEDURE — 99900035 HC TECH TIME PER 15 MIN (STAT)

## 2020-04-16 PROCEDURE — 97165 OT EVAL LOW COMPLEX 30 MIN: CPT

## 2020-04-16 PROCEDURE — 80048 BASIC METABOLIC PNL TOTAL CA: CPT

## 2020-04-16 RX ADMIN — CALCIUM ACETATE 667 MG: 667 CAPSULE ORAL at 08:04

## 2020-04-16 RX ADMIN — OXYCODONE HYDROCHLORIDE AND ACETAMINOPHEN 1 TABLET: 5; 325 TABLET ORAL at 08:04

## 2020-04-16 RX ADMIN — ASPIRIN 81 MG: 81 TABLET, DELAYED RELEASE ORAL at 08:04

## 2020-04-16 RX ADMIN — MAGNESIUM OXIDE 400 MG: 400 TABLET ORAL at 08:04

## 2020-04-16 RX ADMIN — LOSARTAN POTASSIUM 25 MG: 25 TABLET, FILM COATED ORAL at 08:04

## 2020-04-16 RX ADMIN — ISOSORBIDE MONONITRATE 30 MG: 30 TABLET, EXTENDED RELEASE ORAL at 08:04

## 2020-04-16 RX ADMIN — METOPROLOL SUCCINATE 50 MG: 25 TABLET, FILM COATED, EXTENDED RELEASE ORAL at 08:04

## 2020-04-16 RX ADMIN — DILTIAZEM HYDROCHLORIDE 180 MG: 180 CAPSULE, COATED, EXTENDED RELEASE ORAL at 08:04

## 2020-04-16 RX ADMIN — ALBUTEROL SULFATE 2 PUFF: 90 AEROSOL, METERED RESPIRATORY (INHALATION) at 07:04

## 2020-04-16 NOTE — PT/OT/SLP EVAL
"Occupational Therapy   Evaluation and Discharge Note    Name: Griselda Neely  MRN: [id number]  Admitting Diagnosis:  Atrial fibrillation with RVR      Recommendations:     Discharge Recommendations: home with home health  Discharge Equipment Recommendations:     Barriers to discharge:  None    Assessment:     Griselda Neely is a 74 y.o. female with a medical diagnosis of Atrial fibrillation with RVR. At this time, patient is functioning at their prior level of function and does not require further acute OT services.     Plan:     During this hospitalization, patient does not require further acute OT services.  Please re-consult if situation changes.    · Plan of Care Reviewed with: patient    Subjective     Chief Complaint: "I don't want to"   Patient/Family Comments/goals: pt wants to go home    Occupational Profile:  Living Environment: one story house; entrance is unknown due to pt's intermittent refusal to engage  Previous level of function: w/c and walker use  Roles and Routines: lives with   Equipment Used at home:  wheelchair, walker, rolling(pt refusing to fully engage in interview, however, pt did agree to OT question that she had a w/c and RW at home however, this may not be valid due to pt's affect )  Assistance upon Discharge: same assist will be needed as PTA    Pain/Comfort:  · Pain Rating 1: (refuses to state a score number )  · Location - Side 1: Bilateral  · Location 1: knee  · Pain Addressed 1: Nurse notified, Cessation of Activity    Patients cultural, spiritual, Episcopal conflicts given the current situation: no    Objective:     Communicated with: Nursing prior to session.  Patient found up in w/c  sitting at the nurses station with the nurses with telemetry, peripheral IV upon OT entry to room.    General Precautions: Standard, diabetic, fall   Orthopedic Precautions:N/A   Braces: N/A     Occupational Performance:    Bed Mobility:  Pt refuses to stand, pt wants to stay in the w/c " "    Functional Mobility/Transfers:  Pt refuses to stand, pt wants to stay in the w/c     Activities of Daily Living:  · Grooming: pt is refusing to participate in g/h despite sitting at the sink in the w/c     · Lower Body Dressing: dependence    · Toileting: dependence      Cognitive/Visual Perceptual:  Cognitive/Psychosocial Skills:     -       Oriented to: Person, Place and pt becomes agitated with questions, pt refuses to answer some questions    -       Follows Commands/attention:pt becomes agitated with questions, pt refuses to answer some questions    -       Communication: pt becomes agitated with questions, pt refuses to answer some questions    -       Safety awareness/insight to disability: impaired   -       Mood/Affect/Coping skills/emotional control: Anger and Isolative    Physical Exam:  Balance: -       refuses to stand however, pt can sit in w/c without support   Skin integrity: Dry  Upper Extremity Range of Motion:     -       Right Upper Extremity: WFL except AROM shoulder flex 75*, PROM WFL without pain   -       Left Upper Extremity: WFL except pt refusing to lift her L UE AROM, however, PROM WFL without pain   Upper Extremity Strength:    -       Right Upper Extremity: pt's arms do not hold against gravity however, pt is with poor cooperation therefore this may not be a true MMT measure   -       Left Upper Extremity: same as  R UE  Gross motor coordination:   R  hand-eye coordination intact, however, pt refuses to utilize her L UE 'I don't want to"     AMPA 6 Click ADL:  AMPAC Total Score: 6    Treatment & Education:Role of OT, call don't fall, ADL education sitting at the sink in the w/c for hand washing however, pt refusing to participate  Education:    Patient left up in w/c  with all lines intact, call button in reach, nurse notified and PT  present    GOALS:   Multidisciplinary Problems     Occupational Therapy Goals     Not on file                History:     Past Medical History: "   Diagnosis Date    Anemia     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    both legs    CHF (congestive heart failure)     Encounter for blood transfusion 03/2016    Gout     Hypertension     Mitral valve regurgitation     Osteoarthritis     Pancreatitis     Peripheral vascular disease     Peritoneal dialysis catheter in place     Pneumonia 09/09/2017    Renal failure        Past Surgical History:   Procedure Laterality Date    ACHILLES TENDON SURGERY Right     ANGIOGRAPHY OF ARTERIOVENOUS SHUNT Right 1/7/2020    Procedure: Fistulogram with Possible Intervention;  Surgeon: Joseph Loyola MD;  Location: [facility] CATH/EP LAB;  Service: Cardiology;  Laterality: Right;    ANGIOGRAPHY OF LOWER EXTREMITY Left 3/18/2020    Procedure: Angiogram Extremity Unilateral;  Surgeon: Ali Khoobehi, MD;  Location: [facility] CATH/EP LAB;  Service: Cardiology;  Laterality: Left;    APPENDECTOMY      CARDIAC CATHETERIZATION  07/03/2017    CHOLECYSTECTOMY      heal surgery Right     HYSTERECTOMY      INSERTION OF STENT INTO PERIPHERAL VESSEL N/A 1/7/2020    Procedure: INSERTION, STENT, VESSEL, PERIPHERAL;  Surgeon: Joseph Loyola MD;  Location: [facility] CATH/EP LAB;  Service: Cardiology;  Laterality: N/A;    PARATHYROIDECTOMY      PARATHYROIDECTOMY  07/13/2017    PERCUTANEOUS TRANSLUMINAL ANGIOPLASTY OF ARTERIOVENOUS FISTULA N/A 1/7/2020    Procedure: PTA, AV FISTULA;  Surgeon: Joseph Loyola MD;  Location: [facility] CATH/EP LAB;  Service: Cardiology;  Laterality: N/A;    PERITONEAL CATHETER INSERTION      RENAL BIOPSY      vocal cord nodule         Time Tracking:     OT Date of Treatment: 04/16/20  OT Start Time: 0955  OT Stop Time: 1019  OT Total Time (min): 24 min    Billable Minutes:Evaluation 24    Jany Rivero OT  4/16/2020

## 2020-04-16 NOTE — NURSING
Pt rolling around in bed , will not keep telemetry monitor on after attempting throughout day to keep it on. Trying to get oob several times.appears confused and being combative with cna.  Pt. kicks tray off of table at nurse Jayy when trying to offer her bits of food. Pt did not eat lunch. Pt has pulled off dressing to left inner thigh wound x 3. Redressed several times. Will continue to monitor.

## 2020-04-16 NOTE — PLAN OF CARE
04/16/20 1238   Discharge Reassessment   Assessment Type Discharge Planning Assessment   Anticipated Discharge Disposition Home-Health   Discharge Plan A Home;Home Health   Discharge Plan B Home Health;Home   DME Needed Upon Discharge  hospital bed   Post-Acute Status   Discharge Delays None known at this time     Order for hospital bed sent to [facility]. Will contact [employer].     1630 [facility] [employer]  notified of upcoming discharge. Pt has broken hospital bed that needs to be repaired. Per sister patient has no where to sleep until bed is repaired. Explained that patient is being discharged today. Per sister Ms. Watson, her other sister is on the telephone with the DME namrata now waiting to see about the repairs.     1700 Order sent to [facility]. Contacted patient's sister to inform that patient is ready for discharge. Instructed to bring oxygen tank for ride home. Report given to staff RN.

## 2020-04-16 NOTE — PROGRESS NOTES
Progress Note  [facility]    Admit Date: 4/12/2020   LOS: 2 days      TELEMEDICINE VISIT    Reason for Consult: ESRD  Attending Physician: Dr. Gaytan    Telemedicine visit with the assistance of the nurse(s) caring for the patient.  Below is the documentation of the visit where I have personally visualized or heard information or I have noted that the nurse told me of information regarding the patient.  25 minute visit.        SUBJECTIVE:     Follow-up For:  ESRD    Patient is on dialysis.  She is sleeping according to the nurse has been cooperative but still confused.    Review of Systems:  Patient confused and unable to be questioned.    OBJECTIVE:     Vital Signs (Most Recent)  Temp: 97.9 °F (36.6 °C) (04/15/20 2012)  Pulse: 79 (04/15/20 2100)  Resp: 20 (04/15/20 2100)  BP: 137/77 (04/15/20 2012)  SpO2: 97 % (04/15/20 2100)    Vital Signs Range (Last 24H):  Temp:  [97 °F (36.1 °C)-98 °F (36.7 °C)]   Pulse:  []   Resp:  [16-65]   BP: (113-160)/(70-94)   SpO2:  [86 %-100 %]     I/O last 3 completed shifts:  In: 500 [Other:500]  Out: 3500 [Other:3500]    Physical Exam:   Telemedicine visit. Patient on dialysis.  Sleeping.  In no distress.    Laboratory:  Recent Labs   Lab 04/15/20  0321   HGB 10.4*   HCT 34.6*   WBC 6.63          Recent Labs   Lab 04/12/20  1746 04/13/20  0530 04/14/20  0526 04/15/20  0321   * 133* 136 131*   K 5.2* 6.0* 4.8 4.7   CL 90* 96 95 91*   CO2 27 20* 26 24   BUN 41* 43* 36* 42*   CREATININE 6.3* 6.4* 5.3* 5.9*   GLU 83 67* 70 98   CALCIUM 8.6* 8.3* 8.1* 7.8*   CPK 35  --   --   --        Lab Results   Component Value Date    PTH 6.4 (L) 08/09/2019    CALCIUM 7.8 (L) 04/15/2020    CAION 0.84 (L) 07/26/2017    PHOS 4.2 03/19/2020       Lab Results   Component Value Date    URICACID 4.0 03/17/2020       BNP  Recent Labs   Lab 04/12/20  1746   BNP >4,500*     EXAMINATION:  XR CHEST AP PORTABLE    CLINICAL HISTORY:  chest pain;    FINDINGS:  Portable  chest at 10:21 is compared to prior study 04/12/2020 shows stable cardiomegaly there is a prosthetic heart valve.    There is improvement of the alveolar opacities in the right mid and lower lung.  There is tiny right pleural effusion.  There is progression of the airspace disease in the left lung base.  Differential includes edema versus pneumonia.  The left upper lobe is clear.  There are no acute osseous abnormalities.      Impression       Stable moderate cardiomegaly    Improvement of the airspace disease in the right mid and lower lung with progression of the airspace disease in left lung base.  There tiny pleural effusions.  Differential includes edema versus pneumonia      Electronically signed by: Keily Hernandez MD  Date: 04/14/2020  Time: 10:50         Problem List:    Active Hospital Problems    Diagnosis  POA    *Shortness of breath [R06.02]  Yes    Acute on chronic combined systolic and diastolic heart failure [I50.43]  Yes    Atrial fibrillation with RVR [I48.91]  Yes    Encephalopathy [G93.40]  Unknown    DNR (do not resuscitate) [Z66]  Yes     Chronic    Encephalopathy, metabolic [G93.41]  Unknown    Chronic respiratory failure [J96.10]  Yes     Chronic     3 L O2 at home      Anemia due to chronic kidney disease [N18.9, D63.1]  Yes     Likely of chronic disease.       Hyperparathyroidism due to renal insufficiency [N25.81]  Yes    ESRD (end stage renal disease) on HD M,W, F [N18.6]  Yes     Chronic    HTN (hypertension) [I10]  Yes     Chronic      Resolved Hospital Problems    Diagnosis Date Resolved POA    Suspected Covid-19 Virus Infection [R68.89] 04/13/2020 Yes       Nephrology Assessment/Plan:    1.  Shortness of breath  Resolved with ultrafiltration     2.  Hyperkalemia  Improved after the use of a low K dialysate  Monitor serum potassium     3.  COPD  Improved with ultrafiltration and inhalers  Patient continues to smoke  Defer to hospital medicine and pulmonary     4.   Calciphylaxis  She has the left leg wound which causes her severe pain     5.  Anemia  She is at her ESRD goal  No need for DONNY's     6.  A. Fib with RVR  Being treated with Cardizem  Edgar to cardiology and hospital medicine     Joseph Ingram M.D.

## 2020-04-16 NOTE — ASSESSMENT & PLAN NOTE
Rate now up to 160-170s and later cardizem drip discontinued    Plan   Continue warfarin   Home medications    4/14/2020  Much improved off the cardizem drip.    4/16/2020  Stable on cardizem and metoprolol. I have referred the patient to Dr KI Asif in two weeks.  4/16/2020  Pt is rate controlled at present off the cardizem drip

## 2020-04-16 NOTE — PROGRESS NOTES
Progress Note  [facility]    Admit Date: 4/12/2020   LOS: 3 days      TELEMEDICINE VISIT    Reason for Consult: ESRD  Attending Physician: Dr. Gaytan    Telemedicine visit with the assistance of the nurse(s) caring for the patient.  Below is the documentation of the visit where I have personally visualized or heard information or I have noted that the nurse told me of information regarding the patient.  25 minute visit.        SUBJECTIVE:     Follow-up For:  ESRD    She is sleeping according to the nurse has been cooperative but still confused.    Review of Systems:  Patient confused and unable to be questioned.    OBJECTIVE:     Vital Signs (Most Recent)  Temp: 98 °F (36.7 °C) (04/16/20 1118)  Pulse: 86 (04/16/20 0719)  Resp: 20 (04/16/20 0719)  BP: (!) 142/93 (04/16/20 1118)  SpO2: 95 % (04/16/20 0719)    Vital Signs Range (Last 24H):  Temp:  [97 °F (36.1 °C)-98 °F (36.7 °C)]   Pulse:  []   Resp:  [17-20]   BP: (123-160)/(70-94)   SpO2:  [95 %-97 %]     I/O last 3 completed shifts:  In: 500 [Other:500]  Out: 3500 [Other:3500]    Physical Exam:   Telemedicine visit. Sleeping.  In no distress.    Laboratory:  Recent Labs   Lab 04/16/20  0353   HGB 11.0*   HCT 36.8*   WBC 6.92          Recent Labs   Lab 04/12/20  1746  04/14/20  0526 04/15/20  0321 04/16/20  0353   *   < > 136 131* 135*   K 5.2*   < > 4.8 4.7 4.1   CL 90*   < > 95 91* 91*   CO2 27   < > 26 24 26   BUN 41*   < > 36* 42* 24*   CREATININE 6.3*   < > 5.3* 5.9* 4.4*   GLU 83   < > 70 98 92   CALCIUM 8.6*   < > 8.1* 7.8* 8.3*   CPK 35  --   --   --   --     < > = values in this interval not displayed.       Lab Results   Component Value Date    PTH 6.4 (L) 08/09/2019    CALCIUM 8.3 (L) 04/16/2020    CAION 0.84 (L) 07/26/2017    PHOS 4.2 03/19/2020       Lab Results   Component Value Date    URICACID 4.0 03/17/2020       BNP  Recent Labs   Lab 04/12/20  1746   BNP >4,500*     EXAMINATION:  XR CHEST AP  PORTABLE    CLINICAL HISTORY:  chest pain;    FINDINGS:  Portable chest at 10:21 is compared to prior study 04/12/2020 shows stable cardiomegaly there is a prosthetic heart valve.    There is improvement of the alveolar opacities in the right mid and lower lung.  There is tiny right pleural effusion.  There is progression of the airspace disease in the left lung base.  Differential includes edema versus pneumonia.  The left upper lobe is clear.  There are no acute osseous abnormalities.      Impression       Stable moderate cardiomegaly    Improvement of the airspace disease in the right mid and lower lung with progression of the airspace disease in left lung base.  There tiny pleural effusions.  Differential includes edema versus pneumonia      Electronically signed by: Keily Hernandez MD  Date: 04/14/2020  Time: 10:50         Problem List:    Active Hospital Problems    Diagnosis  POA    *Shortness of breath [R06.02]  Yes    Acute on chronic combined systolic and diastolic heart failure [I50.43]  Yes    Atrial fibrillation with RVR [I48.91]  Yes    Encephalopathy [G93.40]  Unknown    DNR (do not resuscitate) [Z66]  Yes     Chronic    Encephalopathy, metabolic [G93.41]  Unknown    Chronic respiratory failure [J96.10]  Yes     Chronic     3 L O2 at home      Anemia due to chronic kidney disease [N18.9, D63.1]  Yes     Likely of chronic disease.       Hyperparathyroidism due to renal insufficiency [N25.81]  Yes    ESRD (end stage renal disease) on HD M,W, F [N18.6]  Yes     Chronic    HTN (hypertension) [I10]  Yes     Chronic      Resolved Hospital Problems    Diagnosis Date Resolved POA    Suspected Covid-19 Virus Infection [R68.89] 04/13/2020 Yes       Nephrology Assessment/Plan:    1.  Combined systolic and diastolic HF  Compensated after ultrafiltration  Continue to UF as tolerated  Pulmonary hypertension complicates fluid removal     2.  Hyperkalemia  Resolved after the use of a low K  dialysate  Monitor serum potassium     3.  COPD  Improved with ultrafiltration and inhalers  Patient continues to smoke  Defer to hospital medicine and pulmonary     4.  Calciphylaxis  She has the left leg wound which causes her severe pain, especially on dialysis  Continue narcotic analgesia with dialysis     5.  Anemia  She is at her ESRD goal  No need for DONNY's     6.  A. Fib with RVR  Fairly good heart rate control with Cardizem  Defer to cardiology and hospital medicine     Joseph Ingram M.D.

## 2020-04-16 NOTE — DISCHARGE SUMMARY
[facility] Medicine  Discharge Summary      Patient Name: Griselda Neely  MRN: [id number]  Admission Date: 4/12/2020  Hospital Length of Stay: 3 days  Discharge Date and Time:  04/16/2020 5:17 PM  Attending Physician: Chalino De Leon MD   Discharging Provider: Chalino De Leon MD  Primary Care Provider: Kalie Neely MD      HPI:   The patient is a 74-year-old female with history of CHF, end-stage renal disease - on HD, pneumonia, peripheral vascular disease, atrial fibrillation- on warfarin, hypertension, hyperlipidemia.   She presented to the ED with cough and shortness of breath. She lives with her sister, Aide. Per her other sister, Elvi Hi at [phone], she has COPD and on home O2 at 3 L NC. She continues to smoke. She appears to be confused and more shortness of breath than usual. 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 She has not complained of other pain or discomfort. Her sister does not know whether the patient has been running fever. Her other sister usually prepares her medication but not certain whether the patient has taken them today. Her sister states that the patient has chronic cough which does not appear to be worsen.         In the ED:  Afebrile  Tachycardic  Tachypneic  BP OK  WBC 5.43  H&H 11.4/38.7  K 5.2  Ferritin 877  CRP 4.25  BNP > 4500  Lactic acid 2.0  Procalcitonin 0.42  Chest radiograph: Increased opacification r hemithorax  CT head Negative  EKG, personally reviewed, atrial fibrillation, rate 107, no ST elevation    * No surgery found *      Hospital Course:   Patient was admitted as above.  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  Cardizem drip was started for all RVR and it was later stopped because of bradycardia  Patient is planned for hemodialysis today    4/14/2020  Pt was very upset this AM, appeared confused and spoke of pain but could not  localize the pain. She had multiple medications last PM including Haldol.S  Multiple tests ordered:  CT head without contrast-no acute finding  EKG-afib with RVR pt is on cardizem  ABG-patient refused  Assessment:  Afib with RVR off cardizem drip with 180 mg PO Q AM and heart rate   Metabolic encephalopathy-resolving  PLAN:reduce oxycodone to 5/325 mg Q 6 prn pain    4/15/20  Pt is awake and alert , sitting up in a chair. She states that she is unsure if we are taking good care of her and I asurred her that she was receiving great care. She would not answer any further questions.    4/16/2020  Pt is sleeping and very calm with no complaints at present. I have spoken to her sister Aide and she states that she will be ready to take her into her home with a hospital bed because she is unable to move the paitent.  5 PM-the patient's sister wishes to pick her up. We attempted to order a hospital bed but she has one that is broken an must be fixed-DME will not send out another. Pt refuses NHP. Her son will come from [place] to care for her.     Consults:   Consults (From admission, onward)        Status Ordering Provider     Inpatient consult to Internal Medicine  Once     Provider:  David Gaytan MD    Acknowledged KATLIN HOLLINGSWORTH     Inpatient consult to Nephrology  Once     Provider:  Joseph Ingram MD    Completed CHATA APARICIO     Inpatient consult to   Once     Provider:  (Not yet assigned)    Completed CAROL ROAWN     Inpatient consult to   Once     Provider:  (Not yet assigned)    Ordered CAROL ROWAN          * Atrial fibrillation with RVR    Rate now up to 160-170s and later cardizem drip discontinued    Plan   Continue warfarin   Home medications    4/14/2020  Much improved off the cardizem drip.    4/16/2020  Stable on cardizem and metoprolol. I have referred the patient to Dr KI Asif in two weeks.  4/16/2020  Pt is rate controlled at present off the  cardizem drip    Encephalopathy  Possible secondary to fluid overload  Resolving    Plan   For HD today   On pain medication  Monitor    4/14/2020  Resolving and likely multifactorial in origin    4/16/2020  Much improved without agitation and she is cooperative.        Encephalopathy, metabolic  4/15/2020  Much improved this date    4/16/2020  Pt is resting comfortably without agitation      ESRD (end stage renal disease) on HD M,W, F   for HD today as per nephrology    4/15/2020  Pt has per her sister had a progressive decline as dialysis has continued. Her sister is elderly and unable to pick her up after frequent falls. She would not cooperate with PT during a previous SNF placement. I have spoken to both of her sisters who will speak to her about NHP, along with her son who lives out of state. I have consulted PT, OT and -for nursing home placement     4/16/2020  Continue dialysis. Pt had a virtual visit with nephrology  Pt is followed by Dr Ingram and will continue dialysis        Final Active Diagnoses:    Diagnosis Date Noted POA    PRINCIPAL PROBLEM:  Atrial fibrillation with RVR [I48.91] 02/02/2020 Yes    Shortness of breath [R06.02] 04/12/2020 Yes    Acute on chronic combined systolic and diastolic heart failure [I50.43] 03/17/2020 Yes    Encephalopathy [G93.40] 01/07/2020 Yes    DNR (do not resuscitate) [Z66] 09/30/2019 Yes     Chronic    Encephalopathy, metabolic [G93.41] 09/07/2019 Yes    Chronic respiratory failure [J96.10] 05/13/2019 Yes     Chronic    Anemia due to chronic kidney disease [N18.9, D63.1] 07/25/2017 Yes    Hyperparathyroidism due to renal insufficiency [N25.81] 01/04/2017 Yes    ESRD (end stage renal disease) on HD M,W, F [N18.6] 05/11/2016 Yes     Chronic    HTN (hypertension) [I10] 08/15/2013 Yes     Chronic      Problems Resolved During this Admission:    Diagnosis Date Noted Date Resolved POA    Suspected Covid-19 Virus Infection [R68.89] 04/13/2020  "04/13/2020 Yes       Discharged Condition: good    Disposition: Home-Health Care Svc    Follow Up:  Follow-up Information     Joseph Ingram MD In 1 week.    Specialty:  Nephrology  Why:  Dialysis as planned  Contact information:  [address]  [facility] [address]  [phone]             Kalie Neely MD In 2 weeks.    Specialty:  Family Medicine  Contact information:  [address]  [address]  [place] [address]  [phone]             Vel Asif MD In 2 weeks.    Specialties:  Cardiovascular Disease, Cardiology, INTERVENTIONAL CARDIOLOGY  Contact information:  [address]  [address]  [place] [address]  [phone]                 Patient Instructions:      HOSPITAL BED FOR HOME USE     Order Specific Question Answer Comments   Type: Semi-electric    Length of need (1-99 months): 99    Does patient have medical equipment at home? none    Height: 5' 5" (1.651 m)    Weight: 59.3 kg (130 lb 11.2 oz)    Please check all that apply: Patient requires, for the alleviation of pain, positioning of the body in ways not feasible in an ordinary bed.      Diet renal     Diet Cardiac     Notify your health care provider if you experience any of the following:  increased confusion or weakness     Notify your health care provider if you experience any of the following:  temperature >100.4     Notify your health care provider if you experience any of the following:  persistent nausea and vomiting or diarrhea     Notify your health care provider if you experience any of the following:  severe uncontrolled pain     Notify your health care provider if you experience any of the following:  redness, tenderness, or signs of infection (pain, swelling, redness, odor or green/yellow discharge around incision site)     Notify your health care provider if you experience any of the following:  difficulty breathing or increased cough     Notify your health care provider if you experience any of the following:  " severe persistent headache     Notify your health care provider if you experience any of the following:  worsening rash     Notify your health care provider if you experience any of the following:  persistent dizziness, light-headedness, or visual disturbances     Notify your health care provider if you experience any of the following:  increased confusion or weakness     Activity as tolerated       Significant Diagnostic Studies: Labs:   BMP:   Recent Labs   Lab 04/15/20  0321 04/16/20  0353   GLU 98 92   * 135*   K 4.7 4.1   CL 91* 91*   CO2 24 26   BUN 42* 24*   CREATININE 5.9* 4.4*   CALCIUM 7.8* 8.3*   MG 2.2 2.1   , CMP   Recent Labs   Lab 04/15/20  0321 04/16/20  0353   * 135*   K 4.7 4.1   CL 91* 91*   CO2 24 26   GLU 98 92   BUN 42* 24*   CREATININE 5.9* 4.4*   CALCIUM 7.8* 8.3*   ANIONGAP 16 18*   ESTGFRAFRICA 7.6* 10.8*   EGFRNONAA 6.6* 9.3*   , CBC   Recent Labs   Lab 04/15/20  0321 04/16/20  0353   WBC 6.63 6.92   HGB 10.4* 11.0*   HCT 34.6* 36.8*    187   , INR   Lab Results   Component Value Date    INR 2.2 04/14/2020    INR 2.3 04/13/2020    INR 1.7 04/12/2020    and Troponin   Recent Labs   Lab 04/14/20  0526   TROPONINI 0.035       Pending Diagnostic Studies:     None         Medications:  Reconciled Home Medications:      Medication List      CONTINUE taking these medications    aspirin 81 MG EC tablet  Commonly known as:  ECOTRIN  Take 81 mg by mouth once daily.     calcium acetate(phosphat bind) 667 mg capsule  Commonly known as:  PHOSLO  Take 667 mg by mouth.     diltiaZEM 180 MG 24 hr capsule  Commonly known as:  CARDIZEM CD  Take 180 mg by mouth once daily.     isosorbide mononitrate 30 MG 24 hr tablet  Commonly known as:  IMDUR  Take 30 mg by mouth once daily.     losartan 25 MG tablet  Commonly known as:  COZAAR  Take 25 mg by mouth once daily.     magnesium oxide 400 mg (241.3 mg magnesium) tablet  Commonly known as:  MAG-OX  Take 400 mg by mouth once daily.      metoprolol succinate 50 MG 24 hr tablet  Commonly known as:  TOPROL-XL  Take 1 tablet (50 mg total) by mouth once daily.     mupirocin 2 % ointment  Commonly known as:  BACTROBAN  Apply topically 3 (three) times daily.     ondansetron 4 MG Tbdl  Commonly known as:  ZOFRAN-ODT  Take 1 tablet (4 mg total) by mouth every 6 (six) hours as needed.     oxyCODONE-acetaminophen  mg per tablet  Commonly known as:  PERCOCET  Patient states taking 5-325mg bid at home. To continue     traMADoL 50 mg tablet  Commonly known as:  ULTRAM  Take 1 tablet (50 mg total) by mouth 3 (three) times a week. 3 TIMES A WEEK AS NEEDED DURING HEMODIALYSIS     warfarin 1 MG tablet  Commonly known as:  COUMADIN  Take 2 tablets (2 mg total) by mouth Daily.            Indwelling Lines/Drains at time of discharge:   Lines/Drains/Airways     Drain                 Hemodialysis AV Fistula 08/08/19 0214 Right upper arm 252 days                Time spent on the discharge of patient: 45 minutes  Patient was seen and examined on the date of discharge and determined to be suitable for discharge.         Chalino De Leon MD  Department of Hospital Medicine  [facility]

## 2020-04-16 NOTE — ASSESSMENT & PLAN NOTE
Rate now up to 160-170s and later cardizem drip discontinued    Plan   Continue warfarin   Home medications    4/14/2020  Much improved off the cardizem drip.    4/16/2020  Pt is rate controlled at present off the cardizem drip

## 2020-04-16 NOTE — PROGRESS NOTES
[facility] Medicine  Progress Note    Patient Name: Girselda Neely  MRN: [id number]  Patient Class: IP- Inpatient   Admission Date: 4/12/2020  Length of Stay: 3 days  Attending Physician: Chalino De Leon MD  Primary Care Provider: Kalie Neely MD        Subjective:     Principal Problem:Atrial fibrillation with RVR        HPI:  The patient is a 74-year-old female with history of CHF, end-stage renal disease - on HD, pneumonia, peripheral vascular disease, atrial fibrillation- on warfarin, hypertension, hyperlipidemia.   She presented to the ED with cough and shortness of breath. She lives with her sister, Aide. Per her other sister, Elvi Hi at [phone], she has COPD and on home O2 at 3 L NC. She continues to smoke. She appears to be confused and more shortness of breath than usual. 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 She has not complained of other pain or discomfort. Her sister does not know whether the patient has been running fever. Her other sister usually prepares her medication but not certain whether the patient has taken them today. Her sister states that the patient has chronic cough which does not appear to be worsen.         In the ED:  Afebrile  Tachycardic  Tachypneic  BP OK  WBC 5.43  H&H 11.4/38.7  K 5.2  Ferritin 877  CRP 4.25  BNP > 4500  Lactic acid 2.0  Procalcitonin 0.42  Chest radiograph: Increased opacification r hemithorax  CT head Negative  EKG, personally reviewed, atrial fibrillation, rate 107, no ST elevation    Overview/Hospital Course:  Patient was admitted as above.  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  Cardizem drip was started for all RVR and it was later stopped because of bradycardia  Patient is planned for hemodialysis today    4/14/2020  Pt was very upset this AM, appeared confused and spoke of pain but could not localize the pain. She  had multiple medications last PM including Haldol.S  Multiple tests ordered:  CT head without contrast-no acute finding  EKG-afib with RVR pt is on cardizem  ABG-patient refused  Assessment:  Afib with RVR off cardizem drip with 180 mg PO Q AM and heart rate   Metabolic encephalopathy-resolving  PLAN:reduce oxycodone to 5/325 mg Q 6 prn pain    4/15/20  Pt is awake and alert , sitting up in a chair. She states that she is unsure if we are taking good care of her and I asurred her that she was receiving great care. She would not answer any further questions.    4/16/2020  Pt is sleeping and very calm with no complaints at present. I have spoken to her sister Aide and she states that she will be ready to take her into her home with a hospital bed because she is unable to move the paitent.  I have spoken to the patient's son Guido who is going to come to town and care for his mother    Interval History: Ms Neely is less agitated with reduction of opoid. Her sister can take her back home with a hospital bed.    Review of Systems   Unable to perform ROS: Other (I am resting)     Objective:     Vital Signs (Most Recent):  Temp: 98 °F (36.7 °C) (04/16/20 1118)  Pulse: 86 (04/16/20 0719)  Resp: 20 (04/16/20 0719)  BP: (!) 142/93 (04/16/20 1118)  SpO2: 95 % (04/16/20 0719) Vital Signs (24h Range):  Temp:  [97 °F (36.1 °C)-98 °F (36.7 °C)] 98 °F (36.7 °C)  Pulse:  [] 86  Resp:  [17-20] 20  SpO2:  [95 %-97 %] 95 %  BP: (123-144)/(70-93) 142/93     Weight: 59.3 kg (130 lb 11.2 oz)  Body mass index is 21.75 kg/m².    Intake/Output Summary (Last 24 hours) at 4/16/2020 1310  Last data filed at 4/15/2020 1435  Gross per 24 hour   Intake 500 ml   Output 3500 ml   Net -3000 ml      Physical Exam   Constitutional: No distress.   HENT:   Head: Normocephalic and atraumatic.   Eyes: Pupils are equal, round, and reactive to light.   Neck: Normal range of motion. Neck supple.   Cardiovascular: Normal rate.    Pulmonary/Chest: Effort normal. She has rales.   occasional   Abdominal: Soft. Bowel sounds are normal.   Musculoskeletal: Normal range of motion.   Neurological: She is alert.   Skin: Skin is warm. She is not diaphoretic.   Psychiatric:   Could not examine       Significant Labs:   BMP:   Recent Labs   Lab 04/16/20  0353   GLU 92   *   K 4.1   CL 91*   CO2 26   BUN 24*   CREATININE 4.4*   CALCIUM 8.3*   MG 2.1     CBC:   Recent Labs   Lab 04/15/20  0321 04/16/20  0353   WBC 6.63 6.92   HGB 10.4* 11.0*   HCT 34.6* 36.8*    187     CMP:   Recent Labs   Lab 04/15/20  0321 04/16/20  0353   * 135*   K 4.7 4.1   CL 91* 91*   CO2 24 26   GLU 98 92   BUN 42* 24*   CREATININE 5.9* 4.4*   CALCIUM 7.8* 8.3*   ANIONGAP 16 18*   EGFRNONAA 6.6* 9.3*     Cardiac Markers: No results for input(s): CKMB, MYOGLOBIN, BNP, TROPISTAT in the last 48 hours.    Significant Imaging: I have reviewed all pertinent imaging results/findings within the past 24 hours.      Assessment/Plan:      * Atrial fibrillation with RVR    Rate now up to 160-170s and later cardizem drip discontinued    Plan   Continue warfarin   Home medications    4/14/2020  Much improved off the cardizem drip.    4/16/2020  Pt is rate controlled at present off the cardizem drip    Shortness of breath    Rapid COVID-19 screen Negative  Secondary to fluid overload/COPD    Plan   Procalcitonin not elevated, no antibiotic for now  For HD today   Control heart rate  Has pulmonary hypertension, PAS in the 50s per echo on 1/3/2020          Acute on chronic combined systolic and diastolic heart failure  BNP > 4500 secondary to fluid overload with ESRD with hyperkalemia     Plan   HD today           Encephalopathy  Possible secondary to fluid overload  Resolving    Plan   For HD today   On pain medication  Monitor    4/14/2020  Resolving and likely multifactorial in origin        Hyperparathyroidism due to renal insufficiency  Chronic medical  condition  Continue home medication      Chronic respiratory failure  Continue O2 supplements  Respiratory treatments  Monitor      DNR (do not resuscitate)  DNR status in the past      Encephalopathy, metabolic  4/15/2020  Much improved this date    4/16/2020  Pt is resting comfortably without agitation      Anemia due to chronic kidney disease  Stable      ESRD (end stage renal disease) on HD M,W, F   for HD today as per nephrology    4/15/2020  Pt has per her sister had a progressive decline as dialysis has continued. Her sister is elderly and unable to pick her up after frequent falls. She would not cooperate with PT during a previous SNF placement. I have spoken to both of her sisters who will speak to her about NHP, along with her son who lives out of state. I have consulted PT, OT and -for nursing home placement     4/16/2020  Continue dialysis. Pt had a virtual visit with nephrology      HTN (hypertension)  Chronic medical condition  Continue home medication  Monitor        VTE Risk Mitigation (From admission, onward)         Ordered     warfarin (COUMADIN) tablet 2 mg  Daily      04/12/20 2044     Place sequential compression device  Until discontinued      04/12/20 2040     IP VTE HIGH RISK PATIENT  Once      04/12/20 2040                      Chalino De Leon MD  Department of Hospital Medicine   [facility]

## 2020-04-16 NOTE — PT/OT/SLP EVAL
"Physical Therapy Evaluation    Patient Name:  Griselda Neely   MRN:  [id number]    Recommendations:     Discharge Recommendations:  home health PT, home with home health   Discharge Equipment Recommendations: rollator, wheelchair, bedside commode   Barriers to discharge: None    Assessment:     Griselda Neely is a 74 y.o. female admitted with a medical diagnosis of Atrial fibrillation with RVR.  She presents with the following impairments/functional limitations:  weakness, impaired endurance, impaired self care skills, impaired functional mobilty, pain, decreased lower extremity function.  Pt lives at home with siblings  requiring questionable level of assistance as pt did not  readily respond to questions.  Pt was uncooperative, stubborn, and remarked frequently " I want to go home. Let's go. Let's go." Pt presented  In W/C  and had just finished with OT nell. Pt refused assessment of B LE strength and ROM. Pt does have hardness to B LE and calciphylaxis. Max A for return to bed.  Recommend D/C home with family and Home Health. Doubtful that pt would consent to needed SNF/NH.    Rehab Prognosis: guarded; patient would benefit from acute skilled PT services to address these deficits and reach maximum level of function.    Recent Surgery: * No surgery found *      Plan:     During this hospitalization, patient to be seen 5 x/week to address the identified rehab impairments via therapeutic activities, therapeutic exercises and progress toward the following goals:    · Plan of Care Expires:  05/16/20    Subjective     Chief Complaint: Desire to go home  Patient/Family Comments/goals: home with siblings  Pain/Comfort:  · Pain Rating 1: (Did not quantify)  · Location - Side 1: Bilateral  · Location 1: leg  · Pain Addressed 1: Reposition    Patients cultural, spiritual, Sabianism conflicts given the current situation:      Living Environment:    Prior to admission, patient received assistance from her siblings. " "Equipment used at home:Rollator, Wheelchair, BSC  .  DME owned (not currently used): none.  Upon discharge, patient will have assistance from her family.    Objective:     Communicated with nurse prior to session.  Patient found up in chair with    upon PT entry to room.    General Precautions: Standard,     Orthopedic Precautions:    Braces:       Exams:  · Cognitive Exam:  Patient is oriented to Person and Place  · RLE ROM: Deficits: stiffness  · RLE Strength: pt didnt allow assessment but appears to be at least 3/5  · LLE ROM: stiffness   · LLE Strength: 3/5 strength    Functional Mobility:  · Bed Mobility:     · Sit to Supine: maximal assistance  · Transfers:     · Sit to Stand:  maximal assistance with no AD      Therapeutic Activities and Exercises:   T/f bed to Wheelchair  with Max A with pt   shouting "my legs hurt." Once seated at EOB pt refused to t/f to supine. "I don't want to move." Max A fo sit to supine.  Pillows placed under knees.    AM-PAC 6 CLICK MOBILITY  Total Score:7     Patient left supine with call button in reach and bed alarm on.    GOALS:   Multidisciplinary Problems     Physical Therapy Goals        Problem: Physical Therapy Goal    Goal Priority Disciplines Outcome Goal Variances Interventions   Physical Therapy Goal     PT, PT/OT      Description:  Goals to be met by:D/C    Patient will increase functional independence with mobility by performin. Supine to sit with Moderate Assistance  2. Sit to supine with Moderate Assistance  3. Sit to stand transfer with Moderate Assistance  4. Bed to chair transfer with Moderate Assistance                      History:     Past Medical History:   Diagnosis Date    Anemia     Calciphylaxis 2017    both legs    CHF (congestive heart failure)     Encounter for blood transfusion 2016    Gout     Hypertension     Mitral valve regurgitation     Osteoarthritis     Pancreatitis     Peripheral vascular disease     Peritoneal dialysis " catheter in place     Pneumonia 09/09/2017    Renal failure        Past Surgical History:   Procedure Laterality Date    ACHILLES TENDON SURGERY Right     ANGIOGRAPHY OF ARTERIOVENOUS SHUNT Right 1/7/2020    Procedure: Fistulogram with Possible Intervention;  Surgeon: Joseph Loyola MD;  Location: [facility] CATH/EP LAB;  Service: Cardiology;  Laterality: Right;    ANGIOGRAPHY OF LOWER EXTREMITY Left 3/18/2020    Procedure: Angiogram Extremity Unilateral;  Surgeon: Ali Khoobehi, MD;  Location: [facility] CATH/EP LAB;  Service: Cardiology;  Laterality: Left;    APPENDECTOMY      CARDIAC CATHETERIZATION  07/03/2017    CHOLECYSTECTOMY      heal surgery Right     HYSTERECTOMY      INSERTION OF STENT INTO PERIPHERAL VESSEL N/A 1/7/2020    Procedure: INSERTION, STENT, VESSEL, PERIPHERAL;  Surgeon: Joseph Loyola MD;  Location: [facility] CATH/EP LAB;  Service: Cardiology;  Laterality: N/A;    PARATHYROIDECTOMY      PARATHYROIDECTOMY  07/13/2017    PERCUTANEOUS TRANSLUMINAL ANGIOPLASTY OF ARTERIOVENOUS FISTULA N/A 1/7/2020    Procedure: PTA, AV FISTULA;  Surgeon: Joseph Loyola MD;  Location: [facility] CATH/EP LAB;  Service: Cardiology;  Laterality: N/A;    PERITONEAL CATHETER INSERTION      RENAL BIOPSY      vocal cord nodule         Time Tracking:     PT Received On: 04/16/20  PT Start Time: 1022     PT Stop Time: 1042  PT Total Time (min): 20 min     Billable Minutes: Evaluation 10 minutes and Therapeutic Activity 10 minutes      Caroline Gilliland, PT  04/16/2020

## 2020-04-16 NOTE — PROGRESS NOTES
"[facility]  Adult Nutrition   Progress Note (Follow-Up)    SUMMARY     Recommendations/Interventions:    Recommendation/Intervention: 1. Continue current diet as tolerated, encourage intake. 2. Added Nepro TID to aid in meeting estimated needs.  Goals: 1. Patient to meet at least 75% of estimated needs via PO intake of meals and supplements.  Nutrition Goal Status: new    Dietitian Rounds Brief:  · Seen 2' LOS. Patient admitted with worsening SOB. Intake poor-Recurring issue during her hospitalization. Patient would benefit from meal assistance and encouragement.  Reason for Assessment  Reason For Assessment: length of stay  Diagnosis: cardiac disease  Relevant Medical History: CHF, HTN, PVD, ESRD, calciphylaxis    Nutrition Risk Screen  Nutrition Risk Screen: large or nonhealing wound, burn or pressure injury       Wound 01/03/20 1723 Other (comment) medial Thigh #1-Wound Image: Images linked    Nutrition/Diet History  Spiritual, Cultural Beliefs, Holiness Practices, Values that Affect Care: no  Food Allergies: NKFA  Factors Affecting Nutritional Intake: None identified at this time    Anthropometrics  Temp: 97.9 °F (36.6 °C)  Height Method: Stated  Height: 5' 5" (165.1 cm)  Height (inches): 65 in  Weight Method: Bed Scale  Weight: 59.3 kg (130 lb 11.2 oz)  Weight (lb): 130.7 lb  Ideal Body Weight (IBW), Female: 125 lb  % Ideal Body Weight, Female (lb): 104 %  BMI (Calculated): 21.7  BMI Grade: 18.5-24.9 - normal     Weight History:  Wt Readings from Last 10 Encounters:   04/14/20 59.3 kg (130 lb 11.2 oz)   03/19/20 58.7 kg (129 lb 6.4 oz)   02/19/20 (P) 64 kg (141 lb)   02/05/20 60.8 kg (134 lb 0.6 oz)   01/08/20 53.4 kg (117 lb 10.2 oz)   12/21/19 59.4 kg (130 lb 14.4 oz)   10/25/19 58.3 kg (128 lb 8.5 oz)   10/22/19 55 kg (121 lb 4.1 oz)   10/03/19 58.1 kg (128 lb)   09/30/19 61 kg (134 lb 7.7 oz)     Lab/Procedures/Meds: Pertinent Labs Reviewed  Clinical Chemistry:  Recent Labs   Lab " 04/12/20 1746 04/16/20  0353   * 135*   K 5.2* 4.1   CL 90* 91*   CO2 27 26   GLU 83 92   BUN 41* 24*   CREATININE 6.3* 4.4*   CALCIUM 8.6* 8.3*   PROT 8.9*  --    ALBUMIN 3.2*  --    BILITOT 0.9  --    ALKPHOS 139*  --    AST 16  --    ALT 10  --    ANIONGAP 16 18*   ESTGFRAFRICA 7.0* 10.8*   EGFRNONAA 6.1* 9.3*   MG 2.2 2.1    < > = values in this interval not displayed.     CBC:   Recent Labs   Lab 04/16/20 0353   WBC 6.92   RBC 4.19   HGB 11.0*   HCT 36.8*      MCV 88   MCH 26.3*   MCHC 29.9*     Cardiac Profile:  Recent Labs   Lab 04/12/20 1746 04/14/20  0526   BNP >4,500*  --    CPK 35  --    TROPONINI 0.030 0.035     Inflammatory Labs:  Recent Labs   Lab 04/12/20 1746   CRP 4.25*   Thyroid & Parathyroid:  Recent Labs   Lab 04/12/20 1746   TSH 5.910*   FREET4 0.90       Medications: Pertinent Medications reviewed  Scheduled Meds:   albuterol  2 puff Inhalation BID    aspirin  81 mg Oral Daily    calcium acetate(phosphat bind)  667 mg Oral TID WM    diltiaZEM  180 mg Oral Daily    isosorbide mononitrate  30 mg Oral Daily    losartan  25 mg Oral Daily    magnesium oxide  400 mg Oral Daily    metoprolol succinate  50 mg Oral Daily    warfarin  2 mg Oral Daily     Continuous Infusions:   dilTIAZem Stopped (04/14/20 1500)     PRN Meds:.acetaminophen, morphine, ondansetron, oxyCODONE-acetaminophen, promethazine (PHENERGAN) IVPB, sodium chloride 0.9%    Estimated/Assessed Needs    Weight Used For Calorie Calculations: 59.3 kg (130 lb 11.7 oz)  Energy Calorie Requirements (kcal): 6104-6090 kcals/day (25-30 kcals/kg)  Energy Need Method: Kcal/kg  Protein Requirements: 71-89 g/day (1.2-1.5 g/kg)  Weight Used For Protein Calculations: 59.3 kg (130 lb 11.7 oz)     Estimated Fluid Requirement Method: RDA Method    Nutrition Prescription Ordered    Current Diet Order: Renal Diet     Evaluation of Received Nutrient/Fluid Intake    Energy Calories Required: not meeting needs  Protein Required: not  meeting needs  Fluid Required: meeting needs  Tolerance: tolerating  % Intake of Estimated Energy Needs: 0 - 25 %  % Meal Intake: 0 - 25 %    Intake/Output Summary (Last 24 hours) at 4/16/2020 1034  Last data filed at 4/15/2020 1435  Gross per 24 hour   Intake 500 ml   Output 3500 ml   Net -3000 ml      Nutrition Risk    Level of Risk/Frequency of Follow-up: high   Monitor and Evaluation    Food and Nutrient Intake: energy intake, food and beverage intake  Food and Nutrient Adminstration: diet order  Physical Activity and Function: nutrition-related ADLs and IADLs  Anthropometric Measurements: weight, weight change  Biochemical Data, Medical Tests and Procedures: electrolyte and renal panel, inflammatory profile, lipid profile, gastrointestinal profile, glucose/endocrine profile  Nutrition-Focused Physical Findings: overall appearance     Nutrition Follow-Up    RD Follow-up?: Yes  Chelo Asif RD 04/16/2020 10:41 AM

## 2020-04-16 NOTE — ASSESSMENT & PLAN NOTE
Possible secondary to fluid overload  Resolving    Plan   For HD today   On pain medication  Monitor    4/14/2020  Resolving and likely multifactorial in origin    4/16/2020  Much improved without agitation and she is cooperative.

## 2020-04-16 NOTE — ASSESSMENT & PLAN NOTE
for HD today as per nephrology    4/15/2020  Pt has per her sister had a progressive decline as dialysis has continued. Her sister is elderly and unable to pick her up after frequent falls. She would not cooperate with PT during a previous SNF placement. I have spoken to both of her sisters who will speak to her about NHP, along with her son who lives out of state. I have consulted PT, OT and -for nursing home placement     4/16/2020  Continue dialysis. Pt had a virtual visit with nephrology

## 2020-04-16 NOTE — SUBJECTIVE & OBJECTIVE
Interval History: Ms Neely is less agitated with reduction of opoid. Her sister can take her back home with a hospital bed.    Review of Systems   Unable to perform ROS: Other (I am resting)     Objective:     Vital Signs (Most Recent):  Temp: 98 °F (36.7 °C) (04/16/20 1118)  Pulse: 86 (04/16/20 0719)  Resp: 20 (04/16/20 0719)  BP: (!) 142/93 (04/16/20 1118)  SpO2: 95 % (04/16/20 0719) Vital Signs (24h Range):  Temp:  [97 °F (36.1 °C)-98 °F (36.7 °C)] 98 °F (36.7 °C)  Pulse:  [] 86  Resp:  [17-20] 20  SpO2:  [95 %-97 %] 95 %  BP: (123-144)/(70-93) 142/93     Weight: 59.3 kg (130 lb 11.2 oz)  Body mass index is 21.75 kg/m².    Intake/Output Summary (Last 24 hours) at 4/16/2020 1310  Last data filed at 4/15/2020 1435  Gross per 24 hour   Intake 500 ml   Output 3500 ml   Net -3000 ml      Physical Exam   Constitutional: No distress.   HENT:   Head: Normocephalic and atraumatic.   Eyes: Pupils are equal, round, and reactive to light.   Neck: Normal range of motion. Neck supple.   Cardiovascular: Normal rate.   Pulmonary/Chest: Effort normal. She has rales.   occasional   Abdominal: Soft. Bowel sounds are normal.   Musculoskeletal: Normal range of motion.   Neurological: She is alert.   Skin: Skin is warm. She is not diaphoretic.   Psychiatric:   Could not examine       Significant Labs:   BMP:   Recent Labs   Lab 04/16/20  0353   GLU 92   *   K 4.1   CL 91*   CO2 26   BUN 24*   CREATININE 4.4*   CALCIUM 8.3*   MG 2.1     CBC:   Recent Labs   Lab 04/15/20  0321 04/16/20  0353   WBC 6.63 6.92   HGB 10.4* 11.0*   HCT 34.6* 36.8*    187     CMP:   Recent Labs   Lab 04/15/20  0321 04/16/20  0353   * 135*   K 4.7 4.1   CL 91* 91*   CO2 24 26   GLU 98 92   BUN 42* 24*   CREATININE 5.9* 4.4*   CALCIUM 7.8* 8.3*   ANIONGAP 16 18*   EGFRNONAA 6.6* 9.3*     Cardiac Markers: No results for input(s): CKMB, MYOGLOBIN, BNP, TROPISTAT in the last 48 hours.    Significant Imaging: I have reviewed all  pertinent imaging results/findings within the past 24 hours.   1-2x/week

## 2020-04-16 NOTE — PLAN OF CARE
04/16/20 0719   Patient Assessment/Suction   Level of Consciousness (AVPU) alert   Respiratory Effort Unlabored   All Lung Fields Breath Sounds coarse   PRE-TX-O2   O2 Device (Oxygen Therapy) High Flow nasal Cannula   $ Is the patient on Low Flow Oxygen? Yes   Flow (L/min) 3   SpO2 95 %   Pulse Oximetry Type Intermittent   $ Pulse Oximetry - Multiple Charge Pulse Oximetry - Multiple   Pulse 86   Resp 20   Inhaler   $ Inhaler Charges MDI (Metered Dose Inahler) Treatment   Daily Review of Necessity (Inhaler) completed   Respiratory Treatment Status (Inhaler) given   Treatment Route (Inhaler) spacer/holding chamber   Patient Position (Inhaler) HOB elevated   Post Treatment Assessment (Inhaler) breath sounds unchanged   Signs of Intolerance (Inhaler) none

## 2020-04-16 NOTE — ASSESSMENT & PLAN NOTE
for HD today as per nephrology    4/15/2020  Pt has per her sister had a progressive decline as dialysis has continued. Her sister is elderly and unable to pick her up after frequent falls. She would not cooperate with PT during a previous SNF placement. I have spoken to both of her sisters who will speak to her about NHP, along with her son who lives out of state. I have consulted PT, OT and -for nursing home placement     4/16/2020  Continue dialysis. Pt had a virtual visit with nephrology  Pt is followed by Dr Ingram and will continue dialysis

## 2020-04-17 LAB
BACTERIA BLD CULT: NORMAL
BACTERIA BLD CULT: NORMAL

## 2020-04-22 ENCOUNTER — HOSPITAL ENCOUNTER (OUTPATIENT)
Facility: HOSPITAL | Age: 75
Discharge: HOME | End: 2020-04-23
Attending: EMERGENCY MEDICINE | Admitting: INTERNAL MEDICINE
Payer: MEDICARE

## 2020-04-22 DIAGNOSIS — R41.82 ALTERED MENTAL STATUS: ICD-10-CM

## 2020-04-22 DIAGNOSIS — E16.2 HYPOGLYCEMIA: Primary | ICD-10-CM

## 2020-04-22 DIAGNOSIS — R07.9 CHEST PAIN: ICD-10-CM

## 2020-04-22 DIAGNOSIS — N18.6 ESRD (END STAGE RENAL DISEASE): Chronic | ICD-10-CM

## 2020-04-22 PROBLEM — I50.43 ACUTE ON CHRONIC COMBINED SYSTOLIC AND DIASTOLIC HEART FAILURE: Status: RESOLVED | Noted: 2020-03-17 | Resolved: 2020-04-22

## 2020-04-22 PROBLEM — R74.01 TRANSAMINITIS: Status: RESOLVED | Noted: 2019-10-19 | Resolved: 2020-04-22

## 2020-04-22 PROBLEM — J96.21 ACUTE ON CHRONIC RESPIRATORY FAILURE WITH HYPOXIA: Status: RESOLVED | Noted: 2019-10-18 | Resolved: 2020-04-22

## 2020-04-22 PROBLEM — I48.91 ATRIAL FIBRILLATION WITH RVR: Status: RESOLVED | Noted: 2020-02-02 | Resolved: 2020-04-22

## 2020-04-22 PROBLEM — J81.1 PULMONARY EDEMA: Status: RESOLVED | Noted: 2019-10-19 | Resolved: 2020-04-22

## 2020-04-22 PROBLEM — R06.02 SHORTNESS OF BREATH: Status: RESOLVED | Noted: 2020-04-12 | Resolved: 2020-04-22

## 2020-04-22 PROBLEM — J81.0 ACUTE PULMONARY EDEMA: Status: RESOLVED | Noted: 2020-02-03 | Resolved: 2020-04-22

## 2020-04-22 LAB
ALBUMIN SERPL BCP-MCNC: 2.9 G/DL (ref 3.5–5.2)
ALP SERPL-CCNC: 92 U/L (ref 55–135)
ALT SERPL W/O P-5'-P-CCNC: 9 U/L (ref 10–44)
ANION GAP SERPL CALC-SCNC: 7 MMOL/L (ref 8–16)
AST SERPL-CCNC: 15 U/L (ref 10–40)
BASOPHILS # BLD AUTO: 0.02 K/UL (ref 0–0.2)
BASOPHILS NFR BLD: 0.4 % (ref 0–1.9)
BILIRUB SERPL-MCNC: 0.9 MG/DL (ref 0.1–1)
BNP SERPL-MCNC: >4500 PG/ML (ref 0–99)
BUN SERPL-MCNC: 15 MG/DL (ref 8–23)
CALCIUM SERPL-MCNC: 8.5 MG/DL (ref 8.7–10.5)
CHLORIDE SERPL-SCNC: 100 MMOL/L (ref 95–110)
CK SERPL-CCNC: 27 U/L (ref 20–180)
CO2 SERPL-SCNC: 31 MMOL/L (ref 23–29)
CREAT SERPL-MCNC: 3.4 MG/DL (ref 0.5–1.4)
CRP SERPL-MCNC: 2.98 MG/DL (ref 0–0.75)
DIFFERENTIAL METHOD: ABNORMAL
EOSINOPHIL # BLD AUTO: 0.1 K/UL (ref 0–0.5)
EOSINOPHIL NFR BLD: 2.3 % (ref 0–8)
ERYTHROCYTE [DISTWIDTH] IN BLOOD BY AUTOMATED COUNT: 22.5 % (ref 11.5–14.5)
EST. GFR  (AFRICAN AMERICAN): 14.7 ML/MIN/1.73 M^2
EST. GFR  (NON AFRICAN AMERICAN): 12.8 ML/MIN/1.73 M^2
FERRITIN SERPL-MCNC: 912 NG/ML (ref 20–300)
GLUCOSE SERPL-MCNC: 102 MG/DL (ref 70–110)
GLUCOSE SERPL-MCNC: 40 MG/DL (ref 70–110)
GLUCOSE SERPL-MCNC: 46 MG/DL (ref 70–110)
GLUCOSE SERPL-MCNC: 62 MG/DL (ref 70–110)
GLUCOSE SERPL-MCNC: 65 MG/DL (ref 70–110)
GLUCOSE SERPL-MCNC: 72 MG/DL (ref 70–110)
HCT VFR BLD AUTO: 35.6 % (ref 37–48.5)
HGB BLD-MCNC: 10.5 G/DL (ref 12–16)
IMM GRANULOCYTES # BLD AUTO: 0.02 K/UL (ref 0–0.04)
IMM GRANULOCYTES NFR BLD AUTO: 0.4 % (ref 0–0.5)
INR PPP: 2
LACTATE SERPL-SCNC: 1.1 MMOL/L (ref 0.5–1.9)
LACTATE SERPL-SCNC: 2.3 MMOL/L (ref 0.5–1.9)
LDH SERPL L TO P-CCNC: 208 U/L (ref 110–260)
LYMPHOCYTES # BLD AUTO: 0.8 K/UL (ref 1–4.8)
LYMPHOCYTES NFR BLD: 14.8 % (ref 18–48)
MCH RBC QN AUTO: 26.7 PG (ref 27–31)
MCHC RBC AUTO-ENTMCNC: 29.5 G/DL (ref 32–36)
MCV RBC AUTO: 91 FL (ref 82–98)
MONOCYTES # BLD AUTO: 0.8 K/UL (ref 0.3–1)
MONOCYTES NFR BLD: 14.4 % (ref 4–15)
NEUTROPHILS # BLD AUTO: 3.9 K/UL (ref 1.8–7.7)
NEUTROPHILS NFR BLD: 67.7 % (ref 38–73)
NRBC BLD-RTO: 0 /100 WBC
PLATELET # BLD AUTO: 149 K/UL (ref 150–350)
PMV BLD AUTO: 11.4 FL (ref 9.2–12.9)
POTASSIUM SERPL-SCNC: 4 MMOL/L (ref 3.5–5.1)
PROCALCITONIN SERPL IA-MCNC: 0.35 NG/ML (ref 0–0.5)
PROT SERPL-MCNC: 7.9 G/DL (ref 6–8.4)
PROTHROMBIN TIME: 22 SEC (ref 10.6–14.8)
RBC # BLD AUTO: 3.93 M/UL (ref 4–5.4)
SARS-COV-2 RDRP RESP QL NAA+PROBE: NEGATIVE
SODIUM SERPL-SCNC: 138 MMOL/L (ref 136–145)
TROPONIN I SERPL DL<=0.01 NG/ML-MCNC: 0.03 NG/ML
WBC # BLD AUTO: 5.69 K/UL (ref 3.9–12.7)

## 2020-04-22 PROCEDURE — 96366 THER/PROPH/DIAG IV INF ADDON: CPT

## 2020-04-22 PROCEDURE — 84484 ASSAY OF TROPONIN QUANT: CPT

## 2020-04-22 PROCEDURE — 96365 THER/PROPH/DIAG IV INF INIT: CPT

## 2020-04-22 PROCEDURE — G0378 HOSPITAL OBSERVATION PER HR: HCPCS

## 2020-04-22 PROCEDURE — 80053 COMPREHEN METABOLIC PANEL: CPT

## 2020-04-22 PROCEDURE — 85610 PROTHROMBIN TIME: CPT

## 2020-04-22 PROCEDURE — U0002 COVID-19 LAB TEST NON-CDC: HCPCS

## 2020-04-22 PROCEDURE — 84145 PROCALCITONIN (PCT): CPT

## 2020-04-22 PROCEDURE — 25000003 PHARM REV CODE 250: Performed by: EMERGENCY MEDICINE

## 2020-04-22 PROCEDURE — 85025 COMPLETE CBC W/AUTO DIFF WBC: CPT

## 2020-04-22 PROCEDURE — 25000003 PHARM REV CODE 250: Performed by: NURSE PRACTITIONER

## 2020-04-22 PROCEDURE — 99285 EMERGENCY DEPT VISIT HI MDM: CPT | Mod: 25

## 2020-04-22 PROCEDURE — 36415 COLL VENOUS BLD VENIPUNCTURE: CPT

## 2020-04-22 PROCEDURE — 86140 C-REACTIVE PROTEIN: CPT

## 2020-04-22 PROCEDURE — 82728 ASSAY OF FERRITIN: CPT

## 2020-04-22 PROCEDURE — 87040 BLOOD CULTURE FOR BACTERIA: CPT

## 2020-04-22 PROCEDURE — 82962 GLUCOSE BLOOD TEST: CPT | Mod: 59

## 2020-04-22 PROCEDURE — S5010 5% DEXTROSE AND 0.45% SALINE: HCPCS | Performed by: NURSE PRACTITIONER

## 2020-04-22 PROCEDURE — 93005 ELECTROCARDIOGRAM TRACING: CPT | Performed by: INTERNAL MEDICINE

## 2020-04-22 PROCEDURE — 83880 ASSAY OF NATRIURETIC PEPTIDE: CPT

## 2020-04-22 PROCEDURE — 83615 LACTATE (LD) (LDH) ENZYME: CPT

## 2020-04-22 PROCEDURE — 83605 ASSAY OF LACTIC ACID: CPT | Mod: 91

## 2020-04-22 PROCEDURE — 82550 ASSAY OF CK (CPK): CPT

## 2020-04-22 RX ORDER — ONDANSETRON 2 MG/ML
4 INJECTION INTRAMUSCULAR; INTRAVENOUS EVERY 8 HOURS PRN
Status: DISCONTINUED | OUTPATIENT
Start: 2020-04-22 | End: 2020-04-23 | Stop reason: HOSPADM

## 2020-04-22 RX ORDER — GLUCAGON 1 MG
1 KIT INJECTION
Status: DISCONTINUED | OUTPATIENT
Start: 2020-04-22 | End: 2020-04-23 | Stop reason: HOSPADM

## 2020-04-22 RX ORDER — ISOSORBIDE MONONITRATE 30 MG/1
30 TABLET, EXTENDED RELEASE ORAL DAILY
Status: DISCONTINUED | OUTPATIENT
Start: 2020-04-23 | End: 2020-04-23 | Stop reason: HOSPADM

## 2020-04-22 RX ORDER — ACETAMINOPHEN 325 MG/1
650 TABLET ORAL EVERY 4 HOURS PRN
Status: DISCONTINUED | OUTPATIENT
Start: 2020-04-22 | End: 2020-04-23 | Stop reason: HOSPADM

## 2020-04-22 RX ORDER — OXYCODONE AND ACETAMINOPHEN 5; 325 MG/1; MG/1
1 TABLET ORAL EVERY 12 HOURS PRN
COMMUNITY
End: 2020-05-01

## 2020-04-22 RX ORDER — METOPROLOL SUCCINATE 25 MG/1
50 TABLET, EXTENDED RELEASE ORAL DAILY
Status: DISCONTINUED | OUTPATIENT
Start: 2020-04-23 | End: 2020-04-23 | Stop reason: HOSPADM

## 2020-04-22 RX ORDER — IBUPROFEN 200 MG
24 TABLET ORAL
Status: DISCONTINUED | OUTPATIENT
Start: 2020-04-22 | End: 2020-04-23 | Stop reason: HOSPADM

## 2020-04-22 RX ORDER — ASPIRIN 81 MG/1
81 TABLET ORAL DAILY
Status: DISCONTINUED | OUTPATIENT
Start: 2020-04-23 | End: 2020-04-23 | Stop reason: HOSPADM

## 2020-04-22 RX ORDER — SODIUM CHLORIDE 0.9 % (FLUSH) 0.9 %
10 SYRINGE (ML) INJECTION
Status: DISCONTINUED | OUTPATIENT
Start: 2020-04-22 | End: 2020-04-23 | Stop reason: HOSPADM

## 2020-04-22 RX ORDER — DEXTROSE 50 % IN WATER (D50W) INTRAVENOUS SYRINGE
25
Status: COMPLETED | OUTPATIENT
Start: 2020-04-22 | End: 2020-04-22

## 2020-04-22 RX ORDER — IBUPROFEN 200 MG
16 TABLET ORAL
Status: DISCONTINUED | OUTPATIENT
Start: 2020-04-22 | End: 2020-04-23 | Stop reason: HOSPADM

## 2020-04-22 RX ORDER — WARFARIN 1 MG/1
2 TABLET ORAL DAILY
Status: DISCONTINUED | OUTPATIENT
Start: 2020-04-22 | End: 2020-04-23 | Stop reason: HOSPADM

## 2020-04-22 RX ORDER — DILTIAZEM HYDROCHLORIDE 180 MG/1
180 CAPSULE, COATED, EXTENDED RELEASE ORAL DAILY
Status: DISCONTINUED | OUTPATIENT
Start: 2020-04-23 | End: 2020-04-23 | Stop reason: HOSPADM

## 2020-04-22 RX ORDER — AMOXICILLIN 250 MG
1 CAPSULE ORAL 2 TIMES DAILY
Status: DISCONTINUED | OUTPATIENT
Start: 2020-04-22 | End: 2020-04-23 | Stop reason: HOSPADM

## 2020-04-22 RX ORDER — LANOLIN ALCOHOL/MO/W.PET/CERES
400 CREAM (GRAM) TOPICAL DAILY
Status: DISCONTINUED | OUTPATIENT
Start: 2020-04-23 | End: 2020-04-23 | Stop reason: HOSPADM

## 2020-04-22 RX ORDER — DEXTROSE MONOHYDRATE AND SODIUM CHLORIDE 5; .45 G/100ML; G/100ML
INJECTION, SOLUTION INTRAVENOUS CONTINUOUS
Status: DISCONTINUED | OUTPATIENT
Start: 2020-04-22 | End: 2020-04-23

## 2020-04-22 RX ORDER — LOSARTAN POTASSIUM 25 MG/1
25 TABLET ORAL DAILY
Status: DISCONTINUED | OUTPATIENT
Start: 2020-04-23 | End: 2020-04-23 | Stop reason: HOSPADM

## 2020-04-22 RX ADMIN — DEXTROSE AND SODIUM CHLORIDE: 5; .45 INJECTION, SOLUTION INTRAVENOUS at 03:04

## 2020-04-22 RX ADMIN — DEXTROSE MONOHYDRATE 25 G: 500 INJECTION PARENTERAL at 08:04

## 2020-04-22 RX ADMIN — DEXTROSE MONOHYDRATE 25 G: 500 INJECTION PARENTERAL at 03:04

## 2020-04-22 NOTE — HPI
"Ms. Neely presents today with complaints of altered mental status post HD. It is severe. It is associated with hypoglycemia CBG 60. She is oriented to person on exam, so history is limited. She has a history of CHF, end-stage renal disease - on HD, pneumonia, peripheral vascular disease, atrial fibrillation- on warfarin, hypertension, hyperlipidemia, and calciphylaxis. I spoke with her sister who states she took a percocet and ultram today and took an extra half of an oxycodone when she got to HD. She was doing fine and then towards the end she became somnolent. They checked her glucose and it was 60 and apparently they were "unable to get a line" and called EMS. It doesn't appear that she was given oral/buccal glucose or IM glucagon, I am not sure why this wasn't tried at HD. She was given half an amp of d50 here, she remained somnolent and repeat CBG was 40. She was given a full amp of D50 and placed on a d5 1/2 ns drip at 50mL/hr given she is ESRD. I spoke with her sister who states she's had increasing falls at home. She also does not know her code status. It appears she was a DNR in the past, but she does not have an advanced directive. Sister agrees to keep her a full code until the patient's mental status returns to baseline and she can confirm code status. I recommended she get an advanced directive signed asap when her mental status is at baseline.  "

## 2020-04-22 NOTE — H&P
"[facility] Medicine  History & Physical    DOS: 04/22/2020  3:47 PM      Patient Name: Griselda Neely  MRN: [id number]  Admission Date: 4/22/2020  Attending Physician: Dr. Portillo  Primary Care Provider: Kalie Neely MD         Patient information was obtained from patient, relative(s) and ER records.     Subjective:     Principal Problem:Encephalopathy, metabolic    Chief Complaint:   Chief Complaint   Patient presents with    Weakness        HPI: Ms. Neely presents today with complaints of altered mental status post HD. It is severe. It is associated with hypoglycemia CBG 60. She is oriented to person on exam, so history is limited. She has a history of CHF, end-stage renal disease - on HD, pneumonia, peripheral vascular disease, atrial fibrillation- on warfarin, hypertension, hyperlipidemia, and calciphylaxis. I spoke with her sister who states she took a percocet and ultram today and took an extra half of an oxycodone when she got to HD. She was doing fine and then towards the end she became somnolent. They checked her glucose and it was 60 and apparently they were "unable to get a line" and called EMS. It doesn't appear that she was given oral/buccal glucose or IM glucagon, I am not sure why this wasn't tried at HD. She was given half an amp of d50 here, she remained somnolent and repeat CBG was 40. She was given a full amp of D50 and placed on a d5 1/2 ns drip at 50mL/hr given she is ESRD. I spoke with her sister who states she's had increasing falls at home. She also does not know her code status. It appears she was a DNR in the past, but she does not have an advanced directive. Sister agrees to keep her a full code until the patient's mental status returns to baseline and she can confirm code status. I recommended she get an advanced directive signed asap when her mental status is at baseline.    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 "    both legs    CHF (congestive heart failure)     Encounter for blood transfusion 03/2016    Gout     Hypertension     Mitral valve regurgitation     Osteoarthritis     Pancreatitis     Peripheral vascular disease     Peritoneal dialysis catheter in place     Pneumonia 09/09/2017    Renal failure        Past Surgical History:   Procedure Laterality Date    ACHILLES TENDON SURGERY Right     ANGIOGRAPHY OF ARTERIOVENOUS SHUNT Right 1/7/2020    Procedure: Fistulogram with Possible Intervention;  Surgeon: Joseph Loyola MD;  Location: [facility] CATH/EP LAB;  Service: Cardiology;  Laterality: Right;    ANGIOGRAPHY OF LOWER EXTREMITY Left 3/18/2020    Procedure: Angiogram Extremity Unilateral;  Surgeon: Ali Khoobehi, MD;  Location: [facility] CATH/EP LAB;  Service: Cardiology;  Laterality: Left;    APPENDECTOMY      CARDIAC CATHETERIZATION  07/03/2017    CHOLECYSTECTOMY      heal surgery Right     HYSTERECTOMY      INSERTION OF STENT INTO PERIPHERAL VESSEL N/A 1/7/2020    Procedure: INSERTION, STENT, VESSEL, PERIPHERAL;  Surgeon: Joseph Loyola MD;  Location: [facility] CATH/EP LAB;  Service: Cardiology;  Laterality: N/A;    PARATHYROIDECTOMY      PARATHYROIDECTOMY  07/13/2017    PERCUTANEOUS TRANSLUMINAL ANGIOPLASTY OF ARTERIOVENOUS FISTULA N/A 1/7/2020    Procedure: PTA, AV FISTULA;  Surgeon: Joseph Loyola MD;  Location: [facility] CATH/EP LAB;  Service: Cardiology;  Laterality: N/A;    PERITONEAL CATHETER INSERTION      RENAL BIOPSY      vocal cord nodule         Review of patient's allergies indicates:  No Known Allergies    No current facility-administered medications on file prior to encounter.      Current Outpatient Medications on File Prior to Encounter   Medication Sig    aspirin (ECOTRIN) 81 MG EC tablet Take 81 mg by mouth once daily.    diltiaZEM (CARDIZEM CD) 180 MG 24 hr capsule Take 180 mg by mouth once daily.    isosorbide mononitrate (IMDUR) 30 MG 24 hr tablet Take 30 mg by mouth once  daily.    losartan (COZAAR) 25 MG tablet Take 25 mg by mouth once daily.    magnesium oxide (MAG-OX) 400 mg (241.3 mg magnesium) tablet Take 400 mg by mouth once daily.    metoprolol succinate (TOPROL-XL) 50 MG 24 hr tablet Take 1 tablet (50 mg total) by mouth once daily. (Patient taking differently: Take 50 mg by mouth once daily. )    oxyCODONE-acetaminophen (PERCOCET) 5-325 mg per tablet Take 1 tablet by mouth every 12 (twelve) hours as needed for Pain.    traMADol (ULTRAM) 50 mg tablet Take 1 tablet (50 mg total) by mouth 3 (three) times a week. 3 TIMES A WEEK AS NEEDED DURING HEMODIALYSIS    warfarin (COUMADIN) 1 MG tablet Take 2 tablets (2 mg total) by mouth Daily.    calcium acetate (PHOSLO) 667 mg capsule Take 667 mg by mouth.    mupirocin (BACTROBAN) 2 % ointment Apply topically 3 (three) times daily.    ondansetron (ZOFRAN-ODT) 4 MG TbDL Take 1 tablet (4 mg total) by mouth every 6 (six) hours as needed.    oxyCODONE-acetaminophen (PERCOCET)  mg per tablet Patient states taking 5-325mg bid at home. To continue     Family History     Problem Relation (Age of Onset)    Arthritis Mother    Diabetes Mother, Father    Early death Sister, Sister    Heart disease Sister, Maternal Grandfather, Mother    Heart failure Mother    Hypertension Mother        Tobacco Use    Smoking status: Current Some Day Smoker     Packs/day: 0.50     Years: 45.00     Pack years: 22.50     Types: Cigarettes    Smokeless tobacco: Never Used   Substance and Sexual Activity    Alcohol use: No    Drug use: No    Sexual activity: Not on file     Review of Systems   Unable to perform ROS: Mental status change     Objective:     Vital Signs (Most Recent):  Temp: 97.7 °F (36.5 °C) (04/22/20 1134)  Pulse: 75 (04/22/20 1134)  Resp: 12 (04/22/20 1131)  BP: (!) 140/76 (04/22/20 1134)  SpO2: 100 % (04/22/20 1134) Vital Signs (24h Range):  Temp:  [97.7 °F (36.5 °C)] 97.7 °F (36.5 °C)  Pulse:  [70-75] 75  Resp:  [12] 12  SpO2:   [95 %-100 %] 100 %  BP: (140)/(76) 140/76        There is no height or weight on file to calculate BMI.    Physical Exam   Constitutional: She appears well-developed.   Appears ill   HENT:   Head: Normocephalic and atraumatic.   Eyes: Pupils are equal, round, and reactive to light. EOM are normal.   Neck: Normal range of motion. Neck supple.   Cardiovascular: Normal rate and intact distal pulses.   Murmur heard.  Irregular rhythm   Pulmonary/Chest: Effort normal and breath sounds normal. No stridor. No respiratory distress. She has no wheezes.   Abdominal: Soft. Bowel sounds are normal. She exhibits no distension. There is no tenderness.   Musculoskeletal: Normal range of motion.   Neurological:   Oriented to person, drowsy, arouses to gentle touch   Skin: Skin is warm and dry. Capillary refill takes less than 2 seconds.   Calciphylaxis to bilat LE, bandages in place   Psychiatric: She has a normal mood and affect.   Nursing note and vitals reviewed.        CRANIAL NERVES     CN III, IV, VI   Pupils are equal, round, and reactive to light.  Extraocular motions are normal.        Significant Labs:   CBC:   Recent Labs   Lab 04/22/20  1127   WBC 5.69   HGB 10.5*   HCT 35.6*   *     CMP:   Recent Labs   Lab 04/22/20  1127      K 4.0      CO2 31*   GLU 65*   BUN 15   CREATININE 3.4*   CALCIUM 8.5*   PROT 7.9   ALBUMIN 2.9*   BILITOT 0.9   ALKPHOS 92   AST 15   ALT 9*   ANIONGAP 7*   EGFRNONAA 12.8*     Troponin:   Recent Labs   Lab 04/22/20  1127   TROPONINI 0.030       Significant Imaging: EKG: I have reviewed all pertinent results/findings within the past 24 hours and my personal findings are: a fib, controlled rate, no acute ischemic changes    Assessment/Plan:     * Encephalopathy, metabolic  Admit to telemetry   Neurochecks q4h  Likely multifactorial d/t hypoglycemia, extra narcotics, and s/p HD  Replete glucose and encourage eating   CT head negative acute abnormalities   Hold narcotics for  now      Altered mental status  See plan for encephalopathy       Hypoglycemia  CBG q4h   D5 1/2 NS at 50 mL/hr for now until 2 consecutive CBGs over 80. Then will stop. Watch for fluid overload  Encourage eating      ESRD (end stage renal disease) on HD M,W, F  If pt remains here through Friday, consult nephrology and schedule for HD        VTE Risk Mitigation (From admission, onward)         Ordered     warfarin (COUMADIN) tablet 2 mg  Daily      04/22/20 1525     Reason for No Pharmacological VTE Prophylaxis  Once     Question:  Reasons:  Answer:  Physician Provided (leave comment)  Comment:  pt on ED    04/22/20 1525     IP VTE HIGH RISK PATIENT  Once      04/22/20 1525                   Maggie Carpio NP  Department of Hospital Medicine   [facility]

## 2020-04-22 NOTE — SUBJECTIVE & OBJECTIVE
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    both legs    CHF (congestive heart failure)     Encounter for blood transfusion 03/2016    Gout     Hypertension     Mitral valve regurgitation     Osteoarthritis     Pancreatitis     Peripheral vascular disease     Peritoneal dialysis catheter in place     Pneumonia 09/09/2017    Renal failure        Past Surgical History:   Procedure Laterality Date    ACHILLES TENDON SURGERY Right     ANGIOGRAPHY OF ARTERIOVENOUS SHUNT Right 1/7/2020    Procedure: Fistulogram with Possible Intervention;  Surgeon: Joseph Loyola MD;  Location: [facility] CATH/EP LAB;  Service: Cardiology;  Laterality: Right;    ANGIOGRAPHY OF LOWER EXTREMITY Left 3/18/2020    Procedure: Angiogram Extremity Unilateral;  Surgeon: Ali Khoobehi, MD;  Location: [facility] CATH/EP LAB;  Service: Cardiology;  Laterality: Left;    APPENDECTOMY      CARDIAC CATHETERIZATION  07/03/2017    CHOLECYSTECTOMY      heal surgery Right     HYSTERECTOMY      INSERTION OF STENT INTO PERIPHERAL VESSEL N/A 1/7/2020    Procedure: INSERTION, STENT, VESSEL, PERIPHERAL;  Surgeon: Joseph Loyola MD;  Location: [facility] CATH/EP LAB;  Service: Cardiology;  Laterality: N/A;    PARATHYROIDECTOMY      PARATHYROIDECTOMY  07/13/2017    PERCUTANEOUS TRANSLUMINAL ANGIOPLASTY OF ARTERIOVENOUS FISTULA N/A 1/7/2020    Procedure: PTA, AV FISTULA;  Surgeon: Joseph Loyola MD;  Location: [facility] CATH/EP LAB;  Service: Cardiology;  Laterality: N/A;    PERITONEAL CATHETER INSERTION      RENAL BIOPSY      vocal cord nodule         Review of patient's allergies indicates:  No Known Allergies    No current facility-administered medications on file prior to encounter.      Current Outpatient Medications on File Prior to Encounter   Medication Sig    aspirin (ECOTRIN) 81 MG EC tablet Take 81 mg by mouth once daily.    diltiaZEM (CARDIZEM CD) 180 MG 24 hr capsule Take 180 mg by mouth once daily.     isosorbide mononitrate (IMDUR) 30 MG 24 hr tablet Take 30 mg by mouth once daily.    losartan (COZAAR) 25 MG tablet Take 25 mg by mouth once daily.    magnesium oxide (MAG-OX) 400 mg (241.3 mg magnesium) tablet Take 400 mg by mouth once daily.    metoprolol succinate (TOPROL-XL) 50 MG 24 hr tablet Take 1 tablet (50 mg total) by mouth once daily. (Patient taking differently: Take 50 mg by mouth once daily. )    oxyCODONE-acetaminophen (PERCOCET) 5-325 mg per tablet Take 1 tablet by mouth every 12 (twelve) hours as needed for Pain.    traMADol (ULTRAM) 50 mg tablet Take 1 tablet (50 mg total) by mouth 3 (three) times a week. 3 TIMES A WEEK AS NEEDED DURING HEMODIALYSIS    warfarin (COUMADIN) 1 MG tablet Take 2 tablets (2 mg total) by mouth Daily.    calcium acetate (PHOSLO) 667 mg capsule Take 667 mg by mouth.    mupirocin (BACTROBAN) 2 % ointment Apply topically 3 (three) times daily.    ondansetron (ZOFRAN-ODT) 4 MG TbDL Take 1 tablet (4 mg total) by mouth every 6 (six) hours as needed.    oxyCODONE-acetaminophen (PERCOCET)  mg per tablet Patient states taking 5-325mg bid at home. To continue     Family History     Problem Relation (Age of Onset)    Arthritis Mother    Diabetes Mother, Father    Early death Sister, Sister    Heart disease Sister, Maternal Grandfather, Mother    Heart failure Mother    Hypertension Mother        Tobacco Use    Smoking status: Current Some Day Smoker     Packs/day: 0.50     Years: 45.00     Pack years: 22.50     Types: Cigarettes    Smokeless tobacco: Never Used   Substance and Sexual Activity    Alcohol use: No    Drug use: No    Sexual activity: Not on file     Review of Systems   Unable to perform ROS: Mental status change     Objective:     Vital Signs (Most Recent):  Temp: 97.7 °F (36.5 °C) (04/22/20 1134)  Pulse: 75 (04/22/20 1134)  Resp: 12 (04/22/20 1131)  BP: (!) 140/76 (04/22/20 1134)  SpO2: 100 % (04/22/20 1134) Vital Signs (24h Range):  Temp:  [97.7 °F  (36.5 °C)] 97.7 °F (36.5 °C)  Pulse:  [70-75] 75  Resp:  [12] 12  SpO2:  [95 %-100 %] 100 %  BP: (140)/(76) 140/76        There is no height or weight on file to calculate BMI.    Physical Exam   Constitutional: She appears well-developed.   Appears ill   HENT:   Head: Normocephalic and atraumatic.   Eyes: Pupils are equal, round, and reactive to light. EOM are normal.   Neck: Normal range of motion. Neck supple.   Cardiovascular: Normal rate and intact distal pulses.   Murmur heard.  Irregular rhythm   Pulmonary/Chest: Effort normal and breath sounds normal. No stridor. No respiratory distress. She has no wheezes.   Abdominal: Soft. Bowel sounds are normal. She exhibits no distension. There is no tenderness.   Musculoskeletal: Normal range of motion.   Neurological:   Oriented to person, drowsy, arouses to gentle touch   Skin: Skin is warm and dry. Capillary refill takes less than 2 seconds.   Calciphylaxis to bilat LE, bandages in place   Psychiatric: She has a normal mood and affect.   Nursing note and vitals reviewed.        CRANIAL NERVES     CN III, IV, VI   Pupils are equal, round, and reactive to light.  Extraocular motions are normal.        Significant Labs:   CBC:   Recent Labs   Lab 04/22/20  1127   WBC 5.69   HGB 10.5*   HCT 35.6*   *     CMP:   Recent Labs   Lab 04/22/20  1127      K 4.0      CO2 31*   GLU 65*   BUN 15   CREATININE 3.4*   CALCIUM 8.5*   PROT 7.9   ALBUMIN 2.9*   BILITOT 0.9   ALKPHOS 92   AST 15   ALT 9*   ANIONGAP 7*   EGFRNONAA 12.8*     Troponin:   Recent Labs   Lab 04/22/20  1127   TROPONINI 0.030       Significant Imaging: EKG: I have reviewed all pertinent results/findings within the past 24 hours and my personal findings are: a fib, controlled rate, no acute ischemic changes

## 2020-04-22 NOTE — ED PROVIDER NOTES
Encounter Date: 4/22/2020       History     Chief Complaint   Patient presents with    Weakness     Patient here reported altered mental status increased weakness following dialysis patient completed dialysis at the in the dialysis staff reported that she became very somnolent they were unable to wake her up to send her home prompting a call from EMS at EMS arrival patient was vital signs were stable however her blood glucose is 61 arrival emergency department his recheck T was 60 to patient is given half amp of D50 arrival no further history available at this time patient will follow simple commands the does not answer questions appropriately        Review of patient's allergies indicates:  No Known Allergies  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    both legs    CHF (congestive heart failure)     Encounter for blood transfusion 03/2016    Gout     Hypertension     Mitral valve regurgitation     Osteoarthritis     Pancreatitis     Peripheral vascular disease     Peritoneal dialysis catheter in place     Pneumonia 09/09/2017    Renal failure      Past Surgical History:   Procedure Laterality Date    ACHILLES TENDON SURGERY Right     ANGIOGRAPHY OF ARTERIOVENOUS SHUNT Right 1/7/2020    Procedure: Fistulogram with Possible Intervention;  Surgeon: Joseph Loyola MD;  Location: [facility] CATH/EP LAB;  Service: Cardiology;  Laterality: Right;    ANGIOGRAPHY OF LOWER EXTREMITY Left 3/18/2020    Procedure: Angiogram Extremity Unilateral;  Surgeon: Ali Khoobehi, MD;  Location: [facility] CATH/EP LAB;  Service: Cardiology;  Laterality: Left;    APPENDECTOMY      CARDIAC CATHETERIZATION  07/03/2017    CHOLECYSTECTOMY      heal surgery Right     HYSTERECTOMY      INSERTION OF STENT INTO PERIPHERAL VESSEL N/A 1/7/2020    Procedure: INSERTION, STENT, VESSEL, PERIPHERAL;  Surgeon: Joseph Loyola MD;  Location: [facility] CATH/EP LAB;  Service: Cardiology;  Laterality: N/A;     PARATHYROIDECTOMY      PARATHYROIDECTOMY  07/13/2017    PERCUTANEOUS TRANSLUMINAL ANGIOPLASTY OF ARTERIOVENOUS FISTULA N/A 1/7/2020    Procedure: PTA, AV FISTULA;  Surgeon: Joseph Loyola MD;  Location: [facility] CATH/EP LAB;  Service: Cardiology;  Laterality: N/A;    PERITONEAL CATHETER INSERTION      RENAL BIOPSY      vocal cord nodule       Family History   Problem Relation Age of Onset    Heart disease Sister     Early death Sister         heart as baby    Heart disease Maternal Grandfather     Diabetes Mother     Hypertension Mother     Heart failure Mother     Heart disease Mother     Arthritis Mother     Diabetes Father     Early death Sister         infant     Social History     Tobacco Use    Smoking status: Current Some Day Smoker     Packs/day: 0.50     Years: 45.00     Pack years: 22.50     Types: Cigarettes    Smokeless tobacco: Never Used   Substance Use Topics    Alcohol use: No    Drug use: No     Review of Systems   Unable to perform ROS: Mental status change       Physical Exam     Initial Vitals   BP Pulse Resp Temp SpO2   04/22/20 1131 04/22/20 1131 04/22/20 1131 04/22/20 1134 04/22/20 1131   (!) 140/76 70 12 97.7 °F (36.5 °C) 95 %      MAP       --                Physical Exam    Constitutional: She appears well-developed. She appears listless. She is easily aroused. She has a sickly appearance. She appears ill.   HENT:   Head: Normocephalic and atraumatic.   Right Ear: External ear normal.   Left Ear: External ear normal.   Mouth/Throat: Oropharynx is clear and moist.   TMs clear   Eyes: Conjunctivae and EOM are normal. Pupils are equal, round, and reactive to light.   Neck: Normal range of motion.   Cardiovascular: Normal rate, regular rhythm, normal heart sounds and intact distal pulses.   Pulmonary/Chest: She has rales.   Abdominal: Soft. Bowel sounds are normal. There is no tenderness.   Musculoskeletal: Normal range of motion. She exhibits no edema or tenderness.    Neurological: She is easily aroused. She appears listless.   Disoriented somnolent   Skin: Skin is warm and dry. Capillary refill takes less than 2 seconds.   Psychiatric: Her affect is blunt. She is slowed. She is inattentive.         ED Course   Procedures  Labs Reviewed   CBC W/ AUTO DIFFERENTIAL - Abnormal; Notable for the following components:       Result Value    RBC 3.93 (*)     Hemoglobin 10.5 (*)     Hematocrit 35.6 (*)     Mean Corpuscular Hemoglobin 26.7 (*)     Mean Corpuscular Hemoglobin Conc 29.5 (*)     RDW 22.5 (*)     Platelets 149 (*)     Lymph # 0.8 (*)     Lymph% 14.8 (*)     All other components within normal limits   COMPREHENSIVE METABOLIC PANEL - Abnormal; Notable for the following components:    CO2 31 (*)     Glucose 65 (*)     Creatinine 3.4 (*)     Calcium 8.5 (*)     Albumin 2.9 (*)     ALT 9 (*)     Anion Gap 7 (*)     eGFR if  14.7 (*)     eGFR if non  12.8 (*)     All other components within normal limits   C-REACTIVE PROTEIN - Abnormal; Notable for the following components:    CRP 2.98 (*)     All other components within normal limits   FERRITIN - Abnormal; Notable for the following components:    Ferritin 912 (*)     All other components within normal limits   LACTIC ACID, PLASMA - Abnormal; Notable for the following components:    Lactate (Lactic Acid) 2.3 (*)     All other components within normal limits    Narrative:     Lactic Acid critical result(s) repeated. Called and verbal readback   obtained from Gil Breaux RN/ED by TED 04/22/2020 15:23   B-TYPE NATRIURETIC PEPTIDE - Abnormal; Notable for the following components:    BNP >4,500 (*)     All other components within normal limits   PROTIME-INR - Abnormal; Notable for the following components:    PT 22.0 (*)     All other components within normal limits   POCT GLUCOSE - Abnormal; Notable for the following components:    POC Glucose 62 (*)     All other components within normal limits    POCT GLUCOSE - Abnormal; Notable for the following components:    POC Glucose 40 (*)     All other components within normal limits   CULTURE, BLOOD   CULTURE, BLOOD   LACTIC ACID, PLASMA   LACTATE DEHYDROGENASE   CK   TROPONIN I   SARS-COV-2 RNA AMPLIFICATION, QUAL   PROCALCITONIN   PROTIME-INR   URINALYSIS, REFLEX TO URINE CULTURE   DRUG SCREEN PANEL, URINE EMERGENCY   POCT GLUCOSE, HAND-HELD DEVICE   POCT GLUCOSE MONITORING CONTINUOUS        ECG Results          EKG 12-lead (In process)  Result time 04/22/20 12:43:15    In process by Interface, Lab In [facility] (04/22/20 12:43:15)                 Narrative:    Test Reason : R68.89,    Vent. Rate : 076 BPM     Atrial Rate : 131 BPM     P-R Int : 000 ms          QRS Dur : 092 ms      QT Int : 406 ms       P-R-T Axes : 000 -47 103 degrees     QTc Int : 456 ms    Atrial fibrillation  Left anterior fascicular block  Abnormal QRS-T angle, consider primary T wave abnormality  Abnormal ECG  When compared with ECG of 22-APR-2020 11:15,  No significant change was found    Referred By: [email or web address]   SELF           Confirmed By:                             Imaging Results          CT Head Without Contrast (Final result)  Result time 04/22/20 13:23:44    Final result by Camacho Arce IV, MD (04/22/20 13:23:44)                 Impression:      No acute intracranial abnormality.    Chronic small vessel ischemic changes.    Arteriosclerosis.      Electronically signed by: Camacho Arce IV., MD  Date:    04/22/2020  Time:    13:23             Narrative:      CMS MANDATED QUALITY DATA - CT RADIATION - 436    All CT scans at this facility utilize dose modulation, iterative reconstruction, and/or weight based dosing when appropriate to reduce radiation dose to as low as reasonably achievable.    EXAMINATION:  CT HEAD WITHOUT CONTRAST    CLINICAL HISTORY:  Confusion/delirium, altered LOC, unexplained, suspected COVID-19 infection.    COMPARISON:  04/14/2020.    FINDINGS:  There are  scattered changes of decreased attenuation involving the subcortical and periventricular white matter of both hemispheres, compatible with chronic microvascular ischemia.  A small region of remote lacunar infarction extends from the right lentiform nucleus into the corona radiata.  There are no findings of acute hemorrhage or infarction.  There is no evidence of intra-axial mass or mass effect.  There is no midline shift.    The ventricular system is appropriate in size and position for age.  There are no extra-axial fluid collections.    No acute osseous abnormality is identified.  There is partial opacification of 1 of the right posterior ethmoid air cells.    Calcified plaque formation is observed within the intracranial segments of the internal carotid arteries.                               X-Ray Chest AP Portable (Final result)  Result time 04/22/20 12:59:22    Final result by Leonel Aguilar MD (04/22/20 12:59:22)                 Impression:      1. Unchanged cardiomegaly.  2. Interval improvement of central pulmonary vascular prominence and resolution of prior left basilar opacity.  3. Persistent alveolar opacities in right mid and lower lung zone either reflecting alveolar consolidation, atelectasis, sequelae of pulmonary edema, or some combination thereof.      Electronically signed by: Leonel Aguilar MD  Date:    04/22/2020  Time:    12:59             Narrative:    EXAMINATION:  XR CHEST AP PORTABLE    CLINICAL HISTORY:  Suspected Covid-19 Virus Infection;    FINDINGS:  Portable chest at 1221 compared with 04/14/2020 shows unchanged enlarged cardiac silhouette size with prior cardiac valve replacement unchanged.  Mediastinal contours remain within normal limits.  Patient is rotated.    Patchy opacities occur in right mid and lower lung zone.  Left lung is clear with resolution of prior left basilar opacities.  Central pulmonary vasculature is within normal limits, having improved since the prior exam.  No  acute osseous abnormality.                                 Medical Decision Making:   ED Management:  Patient's laboratory evaluation reviewed CT scan of the head shows no acute changes I have discussed the case with the hospitalist repeat Accu-Chek shows another drop the patient's blood sugar to 41 she given amp of D50 at this time started on D5 half normal saline at 50 cc an hour                                 Clinical Impression:       ICD-10-CM ICD-9-CM   1. Hypoglycemia E16.2 251.2   2. Altered mental status R41.82 780.97   3. Chest pain R07.9 786.50             ED Disposition Condition    Observation                           Gilbert Gutierrez MD  04/22/20 [id number]

## 2020-04-22 NOTE — ASSESSMENT & PLAN NOTE
CBG q4h   D5 1/2 NS at 50 mL/hr for now until 2 consecutive CBGs over 80. Then will stop. Watch for fluid overload  Encourage eating

## 2020-04-22 NOTE — ASSESSMENT & PLAN NOTE
Admit to telemetry   Neurochecks q4h  Likely multifactorial d/t hypoglycemia, extra narcotics, and s/p HD  Replete glucose and encourage eating   CT head negative acute abnormalities   Hold narcotics for now

## 2020-04-23 VITALS
RESPIRATION RATE: 16 BRPM | HEART RATE: 84 BPM | OXYGEN SATURATION: 91 % | HEIGHT: 65 IN | SYSTOLIC BLOOD PRESSURE: 166 MMHG | DIASTOLIC BLOOD PRESSURE: 76 MMHG | TEMPERATURE: 99 F | BODY MASS INDEX: 22.99 KG/M2 | WEIGHT: 138 LBS

## 2020-04-23 PROBLEM — E16.2 HYPOGLYCEMIA: Status: RESOLVED | Noted: 2019-08-06 | Resolved: 2020-04-23

## 2020-04-23 PROBLEM — G93.41 ENCEPHALOPATHY, METABOLIC: Status: RESOLVED | Noted: 2019-09-07 | Resolved: 2020-04-23

## 2020-04-23 PROBLEM — R41.82 ALTERED MENTAL STATUS: Status: RESOLVED | Noted: 2020-04-22 | Resolved: 2020-04-23

## 2020-04-23 LAB
ANION GAP SERPL CALC-SCNC: 8 MMOL/L (ref 8–16)
BASOPHILS # BLD AUTO: 0.02 K/UL (ref 0–0.2)
BASOPHILS NFR BLD: 0.4 % (ref 0–1.9)
BUN SERPL-MCNC: 19 MG/DL (ref 8–23)
CALCIUM SERPL-MCNC: 8.1 MG/DL (ref 8.7–10.5)
CHLORIDE SERPL-SCNC: 102 MMOL/L (ref 95–110)
CO2 SERPL-SCNC: 28 MMOL/L (ref 23–29)
CREAT SERPL-MCNC: 4.2 MG/DL (ref 0.5–1.4)
DIFFERENTIAL METHOD: ABNORMAL
EOSINOPHIL # BLD AUTO: 0.1 K/UL (ref 0–0.5)
EOSINOPHIL NFR BLD: 1.7 % (ref 0–8)
ERYTHROCYTE [DISTWIDTH] IN BLOOD BY AUTOMATED COUNT: 21.9 % (ref 11.5–14.5)
EST. GFR  (AFRICAN AMERICAN): 11.4 ML/MIN/1.73 M^2
EST. GFR  (NON AFRICAN AMERICAN): 9.9 ML/MIN/1.73 M^2
GLUCOSE SERPL-MCNC: 55 MG/DL (ref 70–110)
GLUCOSE SERPL-MCNC: 67 MG/DL (ref 70–110)
GLUCOSE SERPL-MCNC: 67 MG/DL (ref 70–110)
GLUCOSE SERPL-MCNC: 71 MG/DL (ref 70–110)
HCT VFR BLD AUTO: 33.4 % (ref 37–48.5)
HGB BLD-MCNC: 9.8 G/DL (ref 12–16)
IMM GRANULOCYTES # BLD AUTO: 0.03 K/UL (ref 0–0.04)
IMM GRANULOCYTES NFR BLD AUTO: 0.6 % (ref 0–0.5)
LYMPHOCYTES # BLD AUTO: 1.1 K/UL (ref 1–4.8)
LYMPHOCYTES NFR BLD: 20.1 % (ref 18–48)
MAGNESIUM SERPL-MCNC: 2.1 MG/DL (ref 1.6–2.6)
MCH RBC QN AUTO: 26.6 PG (ref 27–31)
MCHC RBC AUTO-ENTMCNC: 29.3 G/DL (ref 32–36)
MCV RBC AUTO: 91 FL (ref 82–98)
MONOCYTES # BLD AUTO: 0.7 K/UL (ref 0.3–1)
MONOCYTES NFR BLD: 13.8 % (ref 4–15)
NEUTROPHILS # BLD AUTO: 3.3 K/UL (ref 1.8–7.7)
NEUTROPHILS NFR BLD: 63.4 % (ref 38–73)
NRBC BLD-RTO: 0 /100 WBC
PHOSPHATE SERPL-MCNC: 3.3 MG/DL (ref 2.7–4.5)
PLATELET # BLD AUTO: 145 K/UL (ref 150–350)
PMV BLD AUTO: 10.6 FL (ref 9.2–12.9)
POTASSIUM SERPL-SCNC: 4.8 MMOL/L (ref 3.5–5.1)
RBC # BLD AUTO: 3.69 M/UL (ref 4–5.4)
SODIUM SERPL-SCNC: 138 MMOL/L (ref 136–145)
WBC # BLD AUTO: 5.23 K/UL (ref 3.9–12.7)

## 2020-04-23 PROCEDURE — G0378 HOSPITAL OBSERVATION PER HR: HCPCS

## 2020-04-23 PROCEDURE — 85025 COMPLETE CBC W/AUTO DIFF WBC: CPT

## 2020-04-23 PROCEDURE — 25000003 PHARM REV CODE 250: Performed by: NURSE PRACTITIONER

## 2020-04-23 PROCEDURE — 83735 ASSAY OF MAGNESIUM: CPT

## 2020-04-23 PROCEDURE — 96366 THER/PROPH/DIAG IV INF ADDON: CPT

## 2020-04-23 PROCEDURE — 36415 COLL VENOUS BLD VENIPUNCTURE: CPT

## 2020-04-23 PROCEDURE — 84100 ASSAY OF PHOSPHORUS: CPT

## 2020-04-23 PROCEDURE — 80048 BASIC METABOLIC PNL TOTAL CA: CPT

## 2020-04-23 RX ADMIN — MAGNESIUM OXIDE 400 MG: 400 TABLET ORAL at 08:04

## 2020-04-23 RX ADMIN — DILTIAZEM HYDROCHLORIDE 180 MG: 180 CAPSULE, COATED, EXTENDED RELEASE ORAL at 08:04

## 2020-04-23 RX ADMIN — ASPIRIN 81 MG: 81 TABLET, DELAYED RELEASE ORAL at 08:04

## 2020-04-23 RX ADMIN — LOSARTAN POTASSIUM 25 MG: 25 TABLET, FILM COATED ORAL at 08:04

## 2020-04-23 RX ADMIN — SENNOSIDES AND DOCUSATE SODIUM 1 TABLET: 8.6; 5 TABLET ORAL at 08:04

## 2020-04-23 RX ADMIN — ISOSORBIDE MONONITRATE 30 MG: 30 TABLET, EXTENDED RELEASE ORAL at 08:04

## 2020-04-23 RX ADMIN — ACETAMINOPHEN 650 MG: 325 TABLET ORAL at 02:04

## 2020-04-23 RX ADMIN — METOPROLOL SUCCINATE 50 MG: 25 TABLET, FILM COATED, EXTENDED RELEASE ORAL at 08:04

## 2020-04-23 NOTE — NURSING
Pt's IV was infiltrated. Attempted to start a new IV as pt needed dextrose IV push because she was refusing to take anything po to raise her sugar. Pt refused stating she was not going to let me stick her. Informed pt that she needed one if she wasn't going to take anything po. Pt agreed to drink orange juice but would not let me stick her. Will cont to monitor.

## 2020-04-23 NOTE — PLAN OF CARE
04/23/20 1127   CORTEZ Message   Medicare Outpatient and Observation Notification regarding financial responsibility Given to patient/caregiver;Explained to patient/caregiver;Signed/date by patient/caregiver   Date CORTEZ was signed 04/23/20   Time CORTEZ was signed 1116

## 2020-04-23 NOTE — PLAN OF CARE
04/23/20 1139   Discharge Assessment   Assessment Type Discharge Planning Assessment   Confirmed/corrected address and phone number on facesheet? Yes   Assessment information obtained from? Other   Prior to hospitilization cognitive status: Unable to Assess   Prior to hospitalization functional status: Partially Dependent   Current cognitive status: Unable to Assess   Current Functional Status: Partially Dependent   Lives With child(jeny), dependent   Able to Return to Prior Arrangements yes   Is patient able to care for self after discharge? No   Patient's perception of discharge disposition skilled nursing facility   Readmission Within the Last 30 Days current reason for admission unrelated to previous admission   If yes, most recent facility name: [facility]   Patient currently being followed by outpatient case management? No   Patient currently receives any other outside agency services? Yes   Name and contact number of agency or person providing outside services [facility]   Is it the patient/care giver preference to resume care with the current outside agency? Yes   Equipment Currently Used at Home wheelchair;walker, rolling;cane, straight   Part D Coverage [employer] through [employer]   Do you have any problems affording any of your prescribed medications? No   Is the patient taking medications as prescribed? yes   Does the patient have transportation home? Yes   Transportation Anticipated family or friend will provide   Dialysis Name and Scheduled days MWF 6:30AM -10:00AM [facility] on [address]   Does the patient receive services at the [facility]? No   Discharge Plan A Skilled Nursing Facility   Discharge Plan B Home with family   DME Needed Upon Discharge  wheelchair   Patient/Family in Agreement with Plan yes   Readmission Questionnaire   At the time of your discharge, did someone talk to you about what your health problems were? Yes   At the time of discharge, did someone talk to  you about what to watch out for regarding worsening of your health problem? No   At the time of discharge, did someone talk to you about what to do if you experienced worsening of your health problem? No   At the time of discharge, did someone talk to you about which medication to take when you left the hospital and which ones to stop taking? Yes   At the time of discharge, did someone talk to you about when and where to follow up with a doctor after you left the hospital? Yes   What do you believe caused you to be sick enough to be re-admitted? fell multiple times   How often do you need to have someone help you when you read instructions, pamphlets, or other written material from your doctor or pharmacy? Rarely   Do you have problems taking your medications as prescribed? No   Do you have any problems affording any of  your prescribed medications? No   Do you have problems obtaining/receiving your medications? No   Does the patient have transportation to healthcare appointments? Yes   Living Arrangements house   Does the patient have family/friends to help with healtcare needs after discharge? yes   Does your caregiver provide all the help you need? Yes   Are you currently feeling confused? Yes   Are you currently having problems thinking? Yes   Are you currently having memory problems? Yes   Have you felt down, depressed, or hopeless? Unable to Assess   Have you felt little interest or pleasure in doing things? Unable to assess   In the last 7 days, my sleep quality was: very poor

## 2020-04-23 NOTE — CONSULTS
[facility]  Cardiology  Consult Note    Patient Name: Griselda Neely  MRN: [id number]  Admission Date: 4/22/2020  Hospital Length of Stay: 0 days  Code Status: Full Code   Attending Provider: Lissa Portillo MD   Consulting Provider: Katharina Powell MD  Primary Care Physician: Kalie Neely MD  Principal Problem:Encephalopathy, metabolic    Patient information was obtained from patient, past medical records and ER records.     Consults  Subjective:     REASON FOR CONSULT:  NSVT     HPI: Ms. Neely was admitted to the hospital with altered mental status post HD and hypoglycemia with CBG 60. She reportedly has not been able to complete her HD due to pain issues. She is currently back to her baseline. She is currently alert and denies any chest pain, shortness of breath. She denies any bleeding issues. She denies any palpitations. Telemetry shows atrial fibrillation and she had NSVT.     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    both legs    CHF (congestive heart failure)     Encounter for blood transfusion 03/2016    Gout     Hypertension     Mitral valve regurgitation     Osteoarthritis     Pancreatitis     Peripheral vascular disease     Peritoneal dialysis catheter in place     Pneumonia 09/09/2017    Renal failure        Past Surgical History:   Procedure Laterality Date    ACHILLES TENDON SURGERY Right     ANGIOGRAPHY OF ARTERIOVENOUS SHUNT Right 1/7/2020    Procedure: Fistulogram with Possible Intervention;  Surgeon: Joseph Loyola MD;  Location: [facility] CATH/EP LAB;  Service: Cardiology;  Laterality: Right;    ANGIOGRAPHY OF LOWER EXTREMITY Left 3/18/2020    Procedure: Angiogram Extremity Unilateral;  Surgeon: Ali Khoobehi, MD;  Location: [facility] CATH/EP LAB;  Service: Cardiology;  Laterality: Left;    APPENDECTOMY      CARDIAC CATHETERIZATION  07/03/2017    CHOLECYSTECTOMY      heal surgery Right     HYSTERECTOMY       INSERTION OF STENT INTO PERIPHERAL VESSEL N/A 1/7/2020    Procedure: INSERTION, STENT, VESSEL, PERIPHERAL;  Surgeon: Joseph Loyola MD;  Location: [facility] CATH/EP LAB;  Service: Cardiology;  Laterality: N/A;    PARATHYROIDECTOMY      PARATHYROIDECTOMY  07/13/2017    PERCUTANEOUS TRANSLUMINAL ANGIOPLASTY OF ARTERIOVENOUS FISTULA N/A 1/7/2020    Procedure: PTA, AV FISTULA;  Surgeon: Joseph Loyola MD;  Location: [facility] CATH/EP LAB;  Service: Cardiology;  Laterality: N/A;    PERITONEAL CATHETER INSERTION      RENAL BIOPSY      vocal cord nodule         Review of patient's allergies indicates:  No Known Allergies    No current facility-administered medications on file prior to encounter.      Current Outpatient Medications on File Prior to Encounter   Medication Sig    aspirin (ECOTRIN) 81 MG EC tablet Take 81 mg by mouth once daily.    diltiaZEM (CARDIZEM CD) 180 MG 24 hr capsule Take 180 mg by mouth once daily.    isosorbide mononitrate (IMDUR) 30 MG 24 hr tablet Take 30 mg by mouth once daily.    losartan (COZAAR) 25 MG tablet Take 25 mg by mouth once daily.    magnesium oxide (MAG-OX) 400 mg (241.3 mg magnesium) tablet Take 400 mg by mouth once daily.    metoprolol succinate (TOPROL-XL) 50 MG 24 hr tablet Take 1 tablet (50 mg total) by mouth once daily. (Patient taking differently: Take 50 mg by mouth once daily. )    oxyCODONE-acetaminophen (PERCOCET) 5-325 mg per tablet Take 1 tablet by mouth every 12 (twelve) hours as needed for Pain.    traMADol (ULTRAM) 50 mg tablet Take 1 tablet (50 mg total) by mouth 3 (three) times a week. 3 TIMES A WEEK AS NEEDED DURING HEMODIALYSIS    warfarin (COUMADIN) 1 MG tablet Take 2 tablets (2 mg total) by mouth Daily.    calcium acetate (PHOSLO) 667 mg capsule Take 667 mg by mouth.    mupirocin (BACTROBAN) 2 % ointment Apply topically 3 (three) times daily.    ondansetron (ZOFRAN-ODT) 4 MG TbDL Take 1 tablet (4 mg total) by mouth every 6 (six) hours as needed.     oxyCODONE-acetaminophen (PERCOCET)  mg per tablet Patient states taking 5-325mg bid at home. To continue       Scheduled Meds:   aspirin  81 mg Oral Daily    diltiaZEM  180 mg Oral Daily    isosorbide mononitrate  30 mg Oral Daily    losartan  25 mg Oral Daily    magnesium oxide  400 mg Oral Daily    metoprolol succinate  50 mg Oral Daily    senna-docusate 8.6-50 mg  1 tablet Oral BID    warfarin  2 mg Oral Daily     Continuous Infusions:   dextrose 5 % and 0.45 % NaCl Stopped (04/23/20 0200)     PRN Meds:.acetaminophen, dextrose 50%, dextrose 50%, glucagon (human recombinant), glucose, glucose, ondansetron, sodium chloride 0.9%    Family History     Problem Relation (Age of Onset)    Arthritis Mother    Diabetes Mother, Father    Early death Sister, Sister    Heart disease Sister, Maternal Grandfather, Mother    Heart failure Mother    Hypertension Mother          Tobacco Use    Smoking status: Current Some Day Smoker     Packs/day: 0.50     Years: 45.00     Pack years: 22.50     Types: Cigarettes    Smokeless tobacco: Never Used   Substance and Sexual Activity    Alcohol use: No    Drug use: No    Sexual activity: Not on file       ROS   No significant headaches or sore throat or runny nose.   No recent changes in vision.   No recent changes in hearing.  No dysphagia or odynophagia.  Denies chest pain.   Denies any cough or hemoptysis.   Denies any abdominal pain, nausea, vomiting, diarrhea or constipation.   Denies any dysuria or polyuria.   Denies any fevers or chills.   Denies any recent significant weight changes.   Denies bleeding diathesis    Objective:     Vital Signs (Most Recent):  Temp: 98.5 °F (36.9 °C) (04/23/20 0723)  Pulse: 86 (04/23/20 0723)  Resp: 16 (04/23/20 0723)  BP: (!) 194/84 (04/23/20 0723)  SpO2: 96 % (04/23/20 0723) Vital Signs (24h Range):  Temp:  [97.3 °F (36.3 °C)-98.5 °F (36.9 °C)] 98.5 °F (36.9 °C)  Pulse:  [70-94] 86  Resp:  [12-17] 16  SpO2:  [85 %-100 %] 96  %  BP: (135-194)/(70-98) 194/84     Weight: 62.6 kg (138 lb 0.1 oz)  Body mass index is 22.97 kg/m².    SpO2: 96 %  O2 Device (Oxygen Therapy): nasal cannula      Intake/Output Summary (Last 24 hours) at 4/23/2020 0916  Last data filed at 4/23/2020 0848  Gross per 24 hour   Intake 627.5 ml   Output --   Net 627.5 ml       Lines/Drains/Airways     Drain                 Hemodialysis AV Fistula 08/08/19 0214 Right upper arm 259 days          Peripheral Intravenous Line                 Peripheral IV - Single Lumen 04/22/20 1121 20 G Left Antecubital less than 1 day                Physical Exam  HEENT: Normocephalic, atraumatic, PERRL, Conjunctiva pink, no scleral icterus.   CVS: S1S2+, iRRR, no murmurs, rubs or gallops, JVP: Normal.  LUNGS: Clear  ABDOMEN: Soft, NT, BS+  EXTREMITIES: No cyanosis, clubbing. + edema  NEURO: AAO X 3.       Significant Labs:   BMP:   Recent Labs   Lab 04/22/20 1127 04/23/20  0358   GLU 65* 67*    138   K 4.0 4.8    102   CO2 31* 28   BUN 15 19   CREATININE 3.4* 4.2*   CALCIUM 8.5* 8.1*   MG  --  2.1   , CMP   Recent Labs   Lab 04/22/20 1127 04/23/20  0358    138   K 4.0 4.8    102   CO2 31* 28   GLU 65* 67*   BUN 15 19   CREATININE 3.4* 4.2*   CALCIUM 8.5* 8.1*   PROT 7.9  --    ALBUMIN 2.9*  --    BILITOT 0.9  --    ALKPHOS 92  --    AST 15  --    ALT 9*  --    ANIONGAP 7* 8   ESTGFRAFRICA 14.7* 11.4*   EGFRNONAA 12.8* 9.9*   , CBC   Recent Labs   Lab 04/22/20 1127 04/23/20  0358   WBC 5.69 5.23   HGB 10.5* 9.8*   HCT 35.6* 33.4*   * 145*   , INR   Recent Labs   Lab 04/22/20 1127   INR 2.0   , Lipid Panel No results for input(s): CHOL, HDL, LDLCALC, TRIG, CHOLHDL in the last 48 hours. and Troponin   Recent Labs   Lab 04/22/20  1127   TROPONINI 0.030       Significant Imaging: Reviewed  Assessment and Plan:     IMPRESSION:  NSVT  Congestive heart failure.  Preserved LV systolic function.  Volume overload on exam.  Mitral regurgitation.  Severe.  With  severe calcification.  Not a candidate for percutaneous interventions.  After discussion with Dr. Lora, she decided to be treated only with medical management. Status post mitral valve replacement with a size 25 mm [employer] bovine pericardial prosthesis via right thoracotomy on by Dr. Lora 3/21/2019. Newly reduced LVEF to 35-40% on 4/11/2019.   Atrial fibrillation.  Persistent. Currently in atrial fibrillation with controlled ventricular response by exam.  Now taking Coumadin with compliance. INR therapeutic.   End-stage renal disease on hemodialysis.  MWF. Follows with Dr. Ingram.  Anemia. Chronic.   Tobacco abuse.  Ongoing despite counseling.  Calciphylaxis.   Seizure disorder.   Left atrial thrombus.     CODE STATUS was addressed on 3/1/2019 and the patient was alert, oriented ×3.  She preferred to be a DNR/DNI.  CODE STATUS was addressed with the presence of her sister. Code status was readdressed on 2/11/2020. Patient still wishes to maintain a DNR status. Her sister, Elvi, will assist in making decisions.      RECOMMENDATIONS:  1. Patient would like to go home. It is ok from Cardiology stand point for the patient to be discharged.   2. Continue her home medical regimen.   3. Maintain K>4.0 and Mg >2.0.   4. Follow up in clinic in 1-2 weeks or sooner if needed.   5. Discussed with Dr Maria.     Thank you for your consult. I will sign off. Please contact us if you have any additional questions.    Katharina Powell MD  Cardiology   [facility]

## 2020-04-23 NOTE — DISCHARGE SUMMARY
"[facility] Medicine  Discharge Summary      Patient Name: Griselda Neely  MRN: [id number]  Admission Date: 4/22/2020  Hospital Length of Stay: 0 days  Discharge Date and Time:  04/23/2020 1:33 PM  Attending Physician: No att. providers found   Discharging Provider: Franky Maria MD  Primary Care Provider: Kalie Neely MD      HPI:   Ms. Neely presents today with complaints of altered mental status post HD. It is severe. It is associated with hypoglycemia CBG 60. She is oriented to person on exam, so history is limited. She has a history of CHF, end-stage renal disease - on HD, pneumonia, peripheral vascular disease, atrial fibrillation- on warfarin, hypertension, hyperlipidemia, and calciphylaxis. I spoke with her sister who states she took a percocet and ultram today and took an extra half of an oxycodone when she got to HD. She was doing fine and then towards the end she became somnolent. They checked her glucose and it was 60 and apparently they were "unable to get a line" and called EMS. It doesn't appear that she was given oral/buccal glucose or IM glucagon, I am not sure why this wasn't tried at HD. She was given half an amp of d50 here, she remained somnolent and repeat CBG was 40. She was given a full amp of D50 and placed on a d5 1/2 ns drip at 50mL/hr given she is ESRD. I spoke with her sister who states she's had increasing falls at home. She also does not know her code status. It appears she was a DNR in the past, but she does not have an advanced directive. Sister agrees to keep her a full code until the patient's mental status returns to baseline and she can confirm code status. I recommended she get an advanced directive signed asap when her mental status is at baseline.    * No surgery found *      Hospital Course:   Patient admitted with acute on chronic encephalopathy  Chronic encephalopathy with waxing and waning acute exacerbations has been going on for years "   This time she got admitted from Dialysis center after staff there thought pt need to get admitted !?  Patient takes Percocet and ultram eventhough it was discussed multiple times before, not to do so   Patient has a history of Inpatient admission with Encephalopathy   almost every month last year   She has been extensively investigated from Neurology point of view, last year and all the tests were normal  She has chronic end-stage renal disease and on hemodialysis with calciphylaxis as complication  She also has significant electrolyte abnormalities      This Time she was also found to have V TACH in the background of chronic lyte abnormalities which are all chronic issue  Patient is DNR,She is not yet ready for hospice  This patient should not get admitted with chronic issues           Consults:   Consults (From admission, onward)        Status Ordering Provider     Inpatient consult to Cardiology  Once     Provider:  Vel Asif MD    Acknowledged JOYCE CALDERA          No new Assessment & Plan notes have been filed under this hospital service since the last note was generated.  Service: Hospital Medicine    Final Active Diagnoses:    Diagnosis Date Noted POA    ESRD (end stage renal disease) on HD M,W, F [N18.6] 05/11/2016 Yes     Chronic      Problems Resolved During this Admission:    Diagnosis Date Noted Date Resolved POA    PRINCIPAL PROBLEM:  Encephalopathy, metabolic [G93.41] 09/07/2019 04/23/2020 Yes    Altered mental status [R41.82] 04/22/2020 04/23/2020 Yes    Hypoglycemia [E16.2] 08/06/2019 04/23/2020 Yes       Discharged Condition: good    Disposition: Home or Self Care    Follow Up:  Follow-up Information     Katharina Powell MD.    Specialties:  Cardiovascular Disease, Cardiology, INTERVENTIONAL CARDIOLOGY  Contact information:  [facility] [address] [phone]                 Patient Instructions:      Diet renal     Activity as tolerated        Significant Diagnostic Studies: Labs:   CMP   Recent Labs   Lab 04/22/20  1127 04/23/20  0358    138   K 4.0 4.8    102   CO2 31* 28   GLU 65* 67*   BUN 15 19   CREATININE 3.4* 4.2*   CALCIUM 8.5* 8.1*   PROT 7.9  --    ALBUMIN 2.9*  --    BILITOT 0.9  --    ALKPHOS 92  --    AST 15  --    ALT 9*  --    ANIONGAP 7* 8   ESTGFRAFRICA 14.7* 11.4*   EGFRNONAA 12.8* 9.9*    and CBC   Recent Labs   Lab 04/22/20  1127 04/23/20  0358   WBC 5.69 5.23   HGB 10.5* 9.8*   HCT 35.6* 33.4*   * 145*       Pending Diagnostic Studies:     None         Medications:  Reconciled Home Medications:      Medication List      CHANGE how you take these medications    oxyCODONE-acetaminophen 5-325 mg per tablet  Commonly known as:  PERCOCET  Take 1 tablet by mouth every 12 (twelve) hours as needed for Pain.  What changed:  Another medication with the same name was removed. Continue taking this medication, and follow the directions you see here.        CONTINUE taking these medications    aspirin 81 MG EC tablet  Commonly known as:  ECOTRIN  Take 81 mg by mouth once daily.     calcium acetate(phosphat bind) 667 mg capsule  Commonly known as:  PHOSLO  Take 667 mg by mouth.     diltiaZEM 180 MG 24 hr capsule  Commonly known as:  CARDIZEM CD  Take 180 mg by mouth once daily.     isosorbide mononitrate 30 MG 24 hr tablet  Commonly known as:  IMDUR  Take 30 mg by mouth once daily.     losartan 25 MG tablet  Commonly known as:  COZAAR  Take 25 mg by mouth once daily.     magnesium oxide 400 mg (241.3 mg magnesium) tablet  Commonly known as:  MAG-OX  Take 400 mg by mouth once daily.     metoprolol succinate 50 MG 24 hr tablet  Commonly known as:  TOPROL-XL  Take 1 tablet (50 mg total) by mouth once daily.     mupirocin 2 % ointment  Commonly known as:  BACTROBAN  Apply topically 3 (three) times daily.     ondansetron 4 MG Tbdl  Commonly known as:  ZOFRAN-ODT  Take 1 tablet (4 mg total) by mouth every 6 (six) hours as  needed.     warfarin 1 MG tablet  Commonly known as:  COUMADIN  Take 2 tablets (2 mg total) by mouth Daily.        STOP taking these medications    traMADoL 50 mg tablet  Commonly known as:  ULTRAM            Indwelling Lines/Drains at time of discharge:   Lines/Drains/Airways     Drain                 Hemodialysis AV Fistula 08/08/19 0214 Right upper arm 259 days                Time spent on the discharge of patient: 25  minutes  Patient was seen and examined on the date of discharge and determined to be suitable for discharge.         Franky Maria MD  Department of Hospital Medicine  [facility]

## 2020-04-23 NOTE — NURSING
Pt had 36 run of [facility]. Notified MD who put in consult for cardiology and to notify day team. Will cont to monitor.

## 2020-04-23 NOTE — NURSING
BG 55- pt ate entire meal including OJ and viktor pudding. Pt refusing to stay and be treated further. Family arrived to pick her up. Pt was AAOx2 and at her baseline.

## 2020-04-23 NOTE — PLAN OF CARE
Pt aox3. Able to answer questions and swallow pills. Blood sugars staying wnl. Educated on plan of care and safety.

## 2020-04-24 ENCOUNTER — TELEPHONE (OUTPATIENT)
Dept: FAMILY MEDICINE | Facility: CLINIC | Age: 75
End: 2020-04-24

## 2020-04-24 NOTE — TELEPHONE ENCOUNTER
Spoke with Renetta at [facility] and was told that pt was in the hospital at the end of her certification period and needs new orders for home health. SANCHEZ

## 2020-04-24 NOTE — TELEPHONE ENCOUNTER
----- Message from Margarette Briscoe sent at 4/24/2020 10:31 AM CDT -----  Contact: Jesica Azevedo  Calling for new orders for Home Health  # [phone]

## 2020-04-27 LAB
BACTERIA BLD CULT: NORMAL
BACTERIA BLD CULT: NORMAL

## 2020-04-28 NOTE — TELEPHONE ENCOUNTER
Unable to reach nurse - Nurse on another line. Left message to return call to about pt resuming  home health/ SANCHEZ

## 2020-04-30 ENCOUNTER — DOCUMENT SCAN (OUTPATIENT)
Dept: HOME HEALTH SERVICES | Facility: HOSPITAL | Age: 75
End: 2020-04-30

## 2020-05-01 ENCOUNTER — HOSPITAL ENCOUNTER (OUTPATIENT)
Facility: HOSPITAL | Age: 75
Discharge: HOME | End: 2020-05-02
Attending: EMERGENCY MEDICINE | Admitting: INTERNAL MEDICINE
Payer: MEDICARE

## 2020-05-01 DIAGNOSIS — N18.6 END STAGE RENAL DISEASE: ICD-10-CM

## 2020-05-01 DIAGNOSIS — L97.922 CALCIPHYLAXIS OF LEFT LOWER EXTREMITY WITH NONHEALING ULCER WITH FAT LAYER EXPOSED: ICD-10-CM

## 2020-05-01 DIAGNOSIS — N18.6 ESRD (END STAGE RENAL DISEASE): Chronic | ICD-10-CM

## 2020-05-01 DIAGNOSIS — M79.605 LEFT LEG PAIN: Primary | ICD-10-CM

## 2020-05-01 DIAGNOSIS — E83.59 CALCIPHYLAXIS OF LEFT LOWER EXTREMITY WITH NONHEALING ULCER WITH FAT LAYER EXPOSED: ICD-10-CM

## 2020-05-01 LAB
ABO + RH BLD: NORMAL
ALBUMIN SERPL BCP-MCNC: 2.9 G/DL (ref 3.5–5.2)
ALP SERPL-CCNC: 96 U/L (ref 55–135)
ALT SERPL W/O P-5'-P-CCNC: 11 U/L (ref 10–44)
ANION GAP SERPL CALC-SCNC: 11 MMOL/L (ref 8–16)
APTT PPP: 46.8 SEC (ref 23.6–33.3)
AST SERPL-CCNC: 15 U/L (ref 10–40)
BASOPHILS # BLD AUTO: 0.01 K/UL (ref 0–0.2)
BASOPHILS NFR BLD: 0.2 % (ref 0–1.9)
BILIRUB SERPL-MCNC: 0.7 MG/DL (ref 0.1–1)
BLD GP AB SCN CELLS X3 SERPL QL: NORMAL
BLD PROD TYP BPU: NORMAL
BLOOD UNIT EXPIRATION DATE: NORMAL
BLOOD UNIT TYPE CODE: 7300
BLOOD UNIT TYPE: NORMAL
BUN SERPL-MCNC: 42 MG/DL (ref 8–23)
CALCIUM SERPL-MCNC: 8.2 MG/DL (ref 8.7–10.5)
CHLORIDE SERPL-SCNC: 100 MMOL/L (ref 95–110)
CO2 SERPL-SCNC: 28 MMOL/L (ref 23–29)
CODING SYSTEM: NORMAL
CREAT SERPL-MCNC: 4.3 MG/DL (ref 0.5–1.4)
DIFFERENTIAL METHOD: ABNORMAL
DISPENSE STATUS: NORMAL
EOSINOPHIL # BLD AUTO: 0.1 K/UL (ref 0–0.5)
EOSINOPHIL NFR BLD: 1.3 % (ref 0–8)
ERYTHROCYTE [DISTWIDTH] IN BLOOD BY AUTOMATED COUNT: 19.9 % (ref 11.5–14.5)
EST. GFR  (AFRICAN AMERICAN): 11.1 ML/MIN/1.73 M^2
EST. GFR  (NON AFRICAN AMERICAN): 9.6 ML/MIN/1.73 M^2
GLUCOSE SERPL-MCNC: 80 MG/DL (ref 70–110)
HCT VFR BLD AUTO: 23.6 % (ref 37–48.5)
HGB BLD-MCNC: 7 G/DL (ref 12–16)
IMM GRANULOCYTES # BLD AUTO: 0.01 K/UL (ref 0–0.04)
IMM GRANULOCYTES NFR BLD AUTO: 0.2 % (ref 0–0.5)
INR PPP: 1.7
LACTATE SERPL-SCNC: 1.6 MMOL/L (ref 0.5–1.9)
LYMPHOCYTES # BLD AUTO: 1.1 K/UL (ref 1–4.8)
LYMPHOCYTES NFR BLD: 17.6 % (ref 18–48)
MAGNESIUM SERPL-MCNC: 2 MG/DL (ref 1.6–2.6)
MCH RBC QN AUTO: 26.7 PG (ref 27–31)
MCHC RBC AUTO-ENTMCNC: 29.7 G/DL (ref 32–36)
MCV RBC AUTO: 90 FL (ref 82–98)
MONOCYTES # BLD AUTO: 0.4 K/UL (ref 0.3–1)
MONOCYTES NFR BLD: 7.3 % (ref 4–15)
NEUTROPHILS # BLD AUTO: 4.4 K/UL (ref 1.8–7.7)
NEUTROPHILS NFR BLD: 73.4 % (ref 38–73)
NRBC BLD-RTO: 0 /100 WBC
NUM UNITS TRANS PACKED RBC: NORMAL
PLATELET # BLD AUTO: 212 K/UL (ref 150–350)
PMV BLD AUTO: 10.5 FL (ref 9.2–12.9)
POTASSIUM SERPL-SCNC: 4.8 MMOL/L (ref 3.5–5.1)
PROT SERPL-MCNC: 7.5 G/DL (ref 6–8.4)
PROTHROMBIN TIME: 19.6 SEC (ref 10.6–14.8)
RBC # BLD AUTO: 2.62 M/UL (ref 4–5.4)
SODIUM SERPL-SCNC: 139 MMOL/L (ref 136–145)
WBC # BLD AUTO: 6.02 K/UL (ref 3.9–12.7)

## 2020-05-01 PROCEDURE — 99285 EMERGENCY DEPT VISIT HI MDM: CPT | Mod: 25

## 2020-05-01 PROCEDURE — 90935 HEMODIALYSIS ONE EVALUATION: CPT

## 2020-05-01 PROCEDURE — G0378 HOSPITAL OBSERVATION PER HR: HCPCS

## 2020-05-01 PROCEDURE — 96375 TX/PRO/DX INJ NEW DRUG ADDON: CPT

## 2020-05-01 PROCEDURE — 87040 BLOOD CULTURE FOR BACTERIA: CPT

## 2020-05-01 PROCEDURE — 93005 ELECTROCARDIOGRAM TRACING: CPT | Mod: 59 | Performed by: INTERNAL MEDICINE

## 2020-05-01 PROCEDURE — 83605 ASSAY OF LACTIC ACID: CPT

## 2020-05-01 PROCEDURE — 80053 COMPREHEN METABOLIC PANEL: CPT

## 2020-05-01 PROCEDURE — 86901 BLOOD TYPING SEROLOGIC RH(D): CPT

## 2020-05-01 PROCEDURE — P9016 RBC LEUKOCYTES REDUCED: HCPCS

## 2020-05-01 PROCEDURE — 96374 THER/PROPH/DIAG INJ IV PUSH: CPT

## 2020-05-01 PROCEDURE — 25000003 PHARM REV CODE 250: Performed by: INTERNAL MEDICINE

## 2020-05-01 PROCEDURE — 36430 TRANSFUSION BLD/BLD COMPNT: CPT

## 2020-05-01 PROCEDURE — 85025 COMPLETE CBC W/AUTO DIFF WBC: CPT

## 2020-05-01 PROCEDURE — 85610 PROTHROMBIN TIME: CPT

## 2020-05-01 PROCEDURE — 63600175 PHARM REV CODE 636 W HCPCS: Performed by: EMERGENCY MEDICINE

## 2020-05-01 PROCEDURE — 86920 COMPATIBILITY TEST SPIN: CPT

## 2020-05-01 PROCEDURE — 83735 ASSAY OF MAGNESIUM: CPT

## 2020-05-01 PROCEDURE — 85730 THROMBOPLASTIN TIME PARTIAL: CPT

## 2020-05-01 RX ORDER — IBUPROFEN 200 MG
16 TABLET ORAL
Status: DISCONTINUED | OUTPATIENT
Start: 2020-05-01 | End: 2020-05-02 | Stop reason: HOSPADM

## 2020-05-01 RX ORDER — LOSARTAN POTASSIUM 25 MG/1
25 TABLET ORAL DAILY
Status: DISCONTINUED | OUTPATIENT
Start: 2020-05-01 | End: 2020-05-02 | Stop reason: HOSPADM

## 2020-05-01 RX ORDER — METOPROLOL SUCCINATE 50 MG/1
50 TABLET, EXTENDED RELEASE ORAL DAILY
Status: DISCONTINUED | OUTPATIENT
Start: 2020-05-01 | End: 2020-05-02 | Stop reason: HOSPADM

## 2020-05-01 RX ORDER — LANOLIN ALCOHOL/MO/W.PET/CERES
400 CREAM (GRAM) TOPICAL DAILY
Status: DISCONTINUED | OUTPATIENT
Start: 2020-05-01 | End: 2020-05-02 | Stop reason: HOSPADM

## 2020-05-01 RX ORDER — HYDROCODONE BITARTRATE AND ACETAMINOPHEN 5; 325 MG/1; MG/1
1 TABLET ORAL EVERY 6 HOURS PRN
Status: DISCONTINUED | OUTPATIENT
Start: 2020-05-01 | End: 2020-05-02 | Stop reason: HOSPADM

## 2020-05-01 RX ORDER — SODIUM CHLORIDE 9 MG/ML
INJECTION, SOLUTION INTRAVENOUS ONCE
Status: DISCONTINUED | OUTPATIENT
Start: 2020-05-01 | End: 2020-05-02 | Stop reason: HOSPADM

## 2020-05-01 RX ORDER — SODIUM THIOSULFATE 250 MG/ML
25 INJECTION, SOLUTION INTRAVENOUS
Status: DISCONTINUED | OUTPATIENT
Start: 2020-05-01 | End: 2020-05-02 | Stop reason: HOSPADM

## 2020-05-01 RX ORDER — MORPHINE SULFATE 4 MG/ML
2 INJECTION, SOLUTION INTRAMUSCULAR; INTRAVENOUS
Status: COMPLETED | OUTPATIENT
Start: 2020-05-01 | End: 2020-05-01

## 2020-05-01 RX ORDER — DILTIAZEM HYDROCHLORIDE 180 MG/1
180 CAPSULE, COATED, EXTENDED RELEASE ORAL DAILY
Status: DISCONTINUED | OUTPATIENT
Start: 2020-05-01 | End: 2020-05-02 | Stop reason: HOSPADM

## 2020-05-01 RX ORDER — TALC
9 POWDER (GRAM) TOPICAL NIGHTLY PRN
Status: DISCONTINUED | OUTPATIENT
Start: 2020-05-01 | End: 2020-05-02 | Stop reason: HOSPADM

## 2020-05-01 RX ORDER — ONDANSETRON 4 MG/1
8 TABLET, ORALLY DISINTEGRATING ORAL EVERY 8 HOURS PRN
Status: DISCONTINUED | OUTPATIENT
Start: 2020-05-01 | End: 2020-05-02 | Stop reason: HOSPADM

## 2020-05-01 RX ORDER — ACETAMINOPHEN 325 MG/1
650 TABLET ORAL EVERY 4 HOURS PRN
Status: DISCONTINUED | OUTPATIENT
Start: 2020-05-01 | End: 2020-05-02 | Stop reason: HOSPADM

## 2020-05-01 RX ORDER — ASPIRIN 81 MG/1
81 TABLET ORAL DAILY
Status: DISCONTINUED | OUTPATIENT
Start: 2020-05-01 | End: 2020-05-02 | Stop reason: HOSPADM

## 2020-05-01 RX ORDER — SODIUM CHLORIDE 9 MG/ML
INJECTION, SOLUTION INTRAVENOUS
Status: DISCONTINUED | OUTPATIENT
Start: 2020-05-01 | End: 2020-05-02 | Stop reason: HOSPADM

## 2020-05-01 RX ORDER — OXYCODONE AND ACETAMINOPHEN 10; 325 MG/1; MG/1
1 TABLET ORAL EVERY 4 HOURS PRN
COMMUNITY
End: 2020-06-14 | Stop reason: CLARIF

## 2020-05-01 RX ORDER — ONDANSETRON 2 MG/ML
4 INJECTION INTRAMUSCULAR; INTRAVENOUS
Status: COMPLETED | OUTPATIENT
Start: 2020-05-01 | End: 2020-05-01

## 2020-05-01 RX ORDER — OXYCODONE AND ACETAMINOPHEN 10; 325 MG/1; MG/1
1 TABLET ORAL EVERY 8 HOURS PRN
Status: DISCONTINUED | OUTPATIENT
Start: 2020-05-01 | End: 2020-05-02 | Stop reason: HOSPADM

## 2020-05-01 RX ORDER — SODIUM CHLORIDE 0.9 % (FLUSH) 0.9 %
3 SYRINGE (ML) INJECTION EVERY 6 HOURS PRN
Status: DISCONTINUED | OUTPATIENT
Start: 2020-05-01 | End: 2020-05-02 | Stop reason: HOSPADM

## 2020-05-01 RX ORDER — CALCIUM ACETATE 667 MG/1
667 CAPSULE ORAL
Status: DISCONTINUED | OUTPATIENT
Start: 2020-05-01 | End: 2020-05-02 | Stop reason: HOSPADM

## 2020-05-01 RX ORDER — WARFARIN 1 MG/1
2 TABLET ORAL DAILY
Status: DISCONTINUED | OUTPATIENT
Start: 2020-05-01 | End: 2020-05-02 | Stop reason: HOSPADM

## 2020-05-01 RX ORDER — IBUPROFEN 200 MG
24 TABLET ORAL
Status: DISCONTINUED | OUTPATIENT
Start: 2020-05-01 | End: 2020-05-02 | Stop reason: HOSPADM

## 2020-05-01 RX ORDER — HYDROCODONE BITARTRATE AND ACETAMINOPHEN 500; 5 MG/1; MG/1
TABLET ORAL
Status: DISCONTINUED | OUTPATIENT
Start: 2020-05-01 | End: 2020-05-02 | Stop reason: HOSPADM

## 2020-05-01 RX ORDER — ISOSORBIDE MONONITRATE 30 MG/1
30 TABLET, EXTENDED RELEASE ORAL DAILY
Status: DISCONTINUED | OUTPATIENT
Start: 2020-05-01 | End: 2020-05-02 | Stop reason: HOSPADM

## 2020-05-01 RX ORDER — GLUCAGON 1 MG
1 KIT INJECTION
Status: DISCONTINUED | OUTPATIENT
Start: 2020-05-01 | End: 2020-05-02 | Stop reason: HOSPADM

## 2020-05-01 RX ORDER — TRAMADOL HYDROCHLORIDE 50 MG/1
50 TABLET ORAL EVERY 6 HOURS PRN
Status: ON HOLD | COMMUNITY
End: 2020-05-02 | Stop reason: HOSPADM

## 2020-05-01 RX ADMIN — CALCIUM ACETATE 667 MG: 667 CAPSULE ORAL at 06:05

## 2020-05-01 RX ADMIN — MELATONIN 9 MG: at 08:05

## 2020-05-01 RX ADMIN — WARFARIN SODIUM 2 MG: 1 TABLET ORAL at 06:05

## 2020-05-01 RX ADMIN — ONDANSETRON HYDROCHLORIDE 4 MG: 2 SOLUTION INTRAMUSCULAR; INTRAVENOUS at 09:05

## 2020-05-01 RX ADMIN — SODIUM THIOSULFATE 25 G: 250 INJECTION, SOLUTION INTRAVENOUS at 04:05

## 2020-05-01 RX ADMIN — OXYCODONE HYDROCHLORIDE AND ACETAMINOPHEN 1 TABLET: 10; 325 TABLET ORAL at 08:05

## 2020-05-01 RX ADMIN — HYDROCODONE BITARTRATE AND ACETAMINOPHEN 1 TABLET: 5; 325 TABLET ORAL at 05:05

## 2020-05-01 RX ADMIN — MORPHINE SULFATE 2 MG: 4 INJECTION INTRAVENOUS at 09:05

## 2020-05-01 NOTE — NURSING
74 yr old female well known to wound care    Have not seen pt this visit. She is in dialysis at this time. Nursing described the wound. Appears to be the same.   Will have nursing take picture of wound.   Recommend the usually dressing for her. Clean with chlorhexidine/ns. Pat dry. Apply aquacel AG. Moistened with small amt of ns and covered with mepilex every 2 days.

## 2020-05-01 NOTE — PLAN OF CARE
05/01/20 1353   CORTEZ Message   Medicare Outpatient and Observation Notification regarding financial responsibility Given to patient/caregiver;Explained to patient/caregiver;Signed/date by patient/caregiver   Date CORTEZ was signed 05/01/20   Time CORTEZ was signed 1614

## 2020-05-01 NOTE — ED PROVIDER NOTES
Encounter Date: 5/1/2020       History     Chief Complaint   Patient presents with    Leg Pain     left leg pain seen here for same, states has sore on thigh for months went to dialysis this am and they did not do due to her complaining of pain     Patient here with reported left leg pain sent for evaluation from dialysis after she was unable to sit for dialysis secondary to the severity of pain in her leg patient has a history of calciphylaxis had been receiving wound care as an outpatient total onset of the epidemic now she has had no active wound care but home health does continue to change her dressings there is a foul odor now from the lesions she reports significant fluid retention, shortness of breath and she was able to sit for part of her dialysis on Wednesday        Review of patient's allergies indicates:  No Known Allergies  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    both legs    CHF (congestive heart failure)     Encounter for blood transfusion 03/2016    Gout     Hypertension     Mitral valve regurgitation     Osteoarthritis     Pancreatitis     Peripheral vascular disease     Peritoneal dialysis catheter in place     Pneumonia 09/09/2017    Renal failure      Past Surgical History:   Procedure Laterality Date    ACHILLES TENDON SURGERY Right     ANGIOGRAPHY OF ARTERIOVENOUS SHUNT Right 1/7/2020    Procedure: Fistulogram with Possible Intervention;  Surgeon: Joseph Loyola MD;  Location: [facility] CATH/EP LAB;  Service: Cardiology;  Laterality: Right;    ANGIOGRAPHY OF LOWER EXTREMITY Left 3/18/2020    Procedure: Angiogram Extremity Unilateral;  Surgeon: Ali Khoobehi, MD;  Location: [facility] CATH/EP LAB;  Service: Cardiology;  Laterality: Left;    APPENDECTOMY      CARDIAC CATHETERIZATION  07/03/2017    CHOLECYSTECTOMY      heal surgery Right     HYSTERECTOMY      INSERTION OF STENT INTO PERIPHERAL VESSEL N/A 1/7/2020    Procedure: INSERTION, STENT,  VESSEL, PERIPHERAL;  Surgeon: Joseph Loyola MD;  Location: [facility] CATH/EP LAB;  Service: Cardiology;  Laterality: N/A;    PARATHYROIDECTOMY      PARATHYROIDECTOMY  07/13/2017    PERCUTANEOUS TRANSLUMINAL ANGIOPLASTY OF ARTERIOVENOUS FISTULA N/A 1/7/2020    Procedure: PTA, AV FISTULA;  Surgeon: Joseph Loyola MD;  Location: [facility] CATH/EP LAB;  Service: Cardiology;  Laterality: N/A;    PERITONEAL CATHETER INSERTION      RENAL BIOPSY      vocal cord nodule       Family History   Problem Relation Age of Onset    Heart disease Sister     Early death Sister         heart as baby    Heart disease Maternal Grandfather     Diabetes Mother     Hypertension Mother     Heart failure Mother     Heart disease Mother     Arthritis Mother     Diabetes Father     Early death Sister         infant     Social History     Tobacco Use    Smoking status: Current Some Day Smoker     Packs/day: 0.50     Years: 45.00     Pack years: 22.50     Types: Cigarettes    Smokeless tobacco: Never Used   Substance Use Topics    Alcohol use: No    Drug use: No     Review of Systems   Constitutional: Positive for fatigue.   HENT: Negative.    Eyes: Negative.    Respiratory: Positive for cough.    Cardiovascular: Negative.    Gastrointestinal: Negative for diarrhea, nausea and vomiting.   Endocrine: Negative.    Genitourinary: Negative.    Musculoskeletal: Positive for myalgias.   Skin: Positive for rash.   Allergic/Immunologic: Negative.    Neurological: Negative.    Hematological: Negative.    Psychiatric/Behavioral: Negative.        Physical Exam     Initial Vitals [05/01/20 0856]   BP Pulse Resp Temp SpO2   131/62 80 18 97.8 °F (36.6 °C) 100 %      MAP       --         Physical Exam    Constitutional: She appears well-developed and well-nourished. No distress.   HENT:   Head: Normocephalic and atraumatic.   Right Ear: External ear normal.   Left Ear: External ear normal.   Mouth/Throat: Oropharynx is clear and moist.   Eyes:  Conjunctivae and EOM are normal. Pupils are equal, round, and reactive to light.   Neck: Normal range of motion. Neck supple.   Cardiovascular: Normal rate, regular rhythm, normal heart sounds and intact distal pulses.   Pulmonary/Chest: No respiratory distress. She has no wheezes. She has rales.   Abdominal: Soft. Bowel sounds are normal. There is no tenderness.   Musculoskeletal: She exhibits edema and tenderness.   Left lower extremity with large area calciphylaxis with surrounding edema erythema and foul-smelling drainage   Neurological: She is alert and oriented to person, place, and time. She has normal strength. GCS score is 15. GCS eye subscore is 4. GCS verbal subscore is 5. GCS motor subscore is 6.   Skin: Skin is warm and dry. Capillary refill takes less than 2 seconds.   Psychiatric: She has a normal mood and affect. Her behavior is normal.         ED Course   Procedures  Labs Reviewed   CULTURE, BLOOD   CULTURE, BLOOD   APTT   CBC W/ AUTO DIFFERENTIAL   COMPREHENSIVE METABOLIC PANEL   MAGNESIUM   PROTIME-INR   URINALYSIS, REFLEX TO URINE CULTURE   LACTIC ACID, PLASMA          Imaging Results          X-Ray Chest AP Portable (No Result on File)                  Medical Decision Making:   ED Management:  Discussed patient's care with Dr. Ingram and Dr. Rubio will admit for wound care and dialysis                                 Clinical Impression:       ICD-10-CM ICD-9-CM   1. Left leg pain M79.605 729.5   2. End stage renal disease N18.6 585.6   3. 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 E83.59 707.10    L97.922                                 Gilbert Gutierrez MD  05/01/20 1043

## 2020-05-01 NOTE — CONSULTS
Consult Note  Nephrology    Griselda HOLLINGSWORTH Chin  female  74 y.o.  Consult Requested By: Gilbert Gutierrez MD  Reason for Consult: Acute Kidney Injury    SUBJECTIVE:     History of Present Illness:  Patient is a 74 y.o. female presents from dialysis center for c/o pain to left inner thigh wound and unable to tolerate complete treatment. No c/o otherwise. Counseled extensive about finding a way to attend dialysis treatment in center by encouraging her to bring pillows for support, pain control with pain management as [facility] not helping or in center wound care to heal wound if insurance allows. Declined all. Nephrology consulted for HD needs. Case management consult needed.    Review Of Systems:  Pain inner thigh wound  GEN:  No fever, chills, night sweats, decreased intake  HEENT: No blurry vision, glasses, floaters, hearing defects, sore throat  CV:  No CP, palpitations, edema, ARGUELLO, orthopnea, PND  PULM:  No Cough, SOB, hemoptysis, wheezing   GI:  No nausea, vomiting, constipation, diarrhea, melena, hematochezia, abdominal pain  :  No dysuria, urgency, frequency, cloudy urine, red urine, dribbling, double voiding, incontinence, hx of stones  NEURO: No LOC, seizure activity, dizziness  SKIN:  No rash, pruritis, spots, sores  MS:  No arthralgias, joint swelling, redness or warmth    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    both legs    CHF (congestive heart failure)     Encounter for blood transfusion 03/2016    Gout     Hypertension     Mitral valve regurgitation     Osteoarthritis     Pancreatitis     Peripheral vascular disease     Peritoneal dialysis catheter in place     Pneumonia 09/09/2017    Renal failure      Past Surgical History:   Procedure Laterality Date    ACHILLES TENDON SURGERY Right     ANGIOGRAPHY OF ARTERIOVENOUS SHUNT Right 1/7/2020    Procedure: Fistulogram with Possible Intervention;  Surgeon: Joseph Loyola MD;  Location: [facility] CATH/EP LAB;   Service: Cardiology;  Laterality: Right;    ANGIOGRAPHY OF LOWER EXTREMITY Left 3/18/2020    Procedure: Angiogram Extremity Unilateral;  Surgeon: Ali Khoobehi, MD;  Location: [facility] CATH/EP LAB;  Service: Cardiology;  Laterality: Left;    APPENDECTOMY      CARDIAC CATHETERIZATION  07/03/2017    CHOLECYSTECTOMY      heal surgery Right     HYSTERECTOMY      INSERTION OF STENT INTO PERIPHERAL VESSEL N/A 1/7/2020    Procedure: INSERTION, STENT, VESSEL, PERIPHERAL;  Surgeon: Joseph Loyola MD;  Location: [facility] CATH/EP LAB;  Service: Cardiology;  Laterality: N/A;    PARATHYROIDECTOMY      PARATHYROIDECTOMY  07/13/2017    PERCUTANEOUS TRANSLUMINAL ANGIOPLASTY OF ARTERIOVENOUS FISTULA N/A 1/7/2020    Procedure: PTA, AV FISTULA;  Surgeon: Joseph Loyola MD;  Location: [facility] CATH/EP LAB;  Service: Cardiology;  Laterality: N/A;    PERITONEAL CATHETER INSERTION      RENAL BIOPSY      vocal cord nodule       Family History   Problem Relation Age of Onset    Heart disease Sister     Early death Sister         heart as baby    Heart disease Maternal Grandfather     Diabetes Mother     Hypertension Mother     Heart failure Mother     Heart disease Mother     Arthritis Mother     Diabetes Father     Early death Sister         infant     Social History     Tobacco Use    Smoking status: Current Some Day Smoker     Packs/day: 0.50     Years: 45.00     Pack years: 22.50     Types: Cigarettes    Smokeless tobacco: Never Used   Substance Use Topics    Alcohol use: No    Drug use: No      Review of patient's allergies indicates:  No Known Allergies   Prior to Admission medications    Medication Sig Start Date End Date Taking? Authorizing Provider   aspirin (ECOTRIN) 81 MG EC tablet Take 81 mg by mouth once daily.   Yes Historical Provider, MD   calcium acetate (PHOSLO) 667 mg capsule Take 667 mg by mouth once daily.    Yes Historical Provider, MD   diltiaZEM (CARDIZEM CD) 180 MG 24 hr capsule Take 180 mg by  mouth once daily.   Yes Historical Provider, MD   isosorbide mononitrate (IMDUR) 30 MG 24 hr tablet Take 30 mg by mouth once daily.   Yes Historical Provider, MD   losartan (COZAAR) 25 MG tablet Take 25 mg by mouth once daily.   Yes Historical Provider, MD   magnesium oxide (MAG-OX) 400 mg (241.3 mg magnesium) tablet Take 400 mg by mouth once daily.   Yes Historical Provider, MD   metoprolol succinate (TOPROL-XL) 50 MG 24 hr tablet Take 1 tablet (50 mg total) by mouth once daily.  Patient taking differently: Take 50 mg by mouth once daily.  8/10/19 8/9/20 Yes Daren Lea MD   mupirocin (BACTROBAN) 2 % ointment Apply topically 3 (three) times daily.  Patient taking differently: Apply 1 g topically 3 (three) times daily.  2/19/20  Yes Kalie Neely MD   ondansetron (ZOFRAN-ODT) 4 MG TbDL Take 1 tablet (4 mg total) by mouth every 6 (six) hours as needed. 2/19/20  Yes Kalie Neely MD   oxyCODONE-acetaminophen (PERCOCET)  mg per tablet Take 1 tablet by mouth every 4 (four) hours as needed for Pain.   Yes Historical Provider, MD   traMADoL (ULTRAM) 50 mg tablet Take 50 mg by mouth every 6 (six) hours as needed for Pain.   Yes Historical Provider, MD   warfarin (COUMADIN) 1 MG tablet Take 2 tablets (2 mg total) by mouth Daily. 3/19/20 5/1/20 Yes Lissa Portillo MD   oxyCODONE-acetaminophen (PERCOCET) 5-325 mg per tablet Take 1 tablet by mouth every 12 (twelve) hours as needed for Pain.  5/1/20  Historical Provider, MD              aspirin  81 mg Oral Daily    calcium acetate(phosphat bind)  667 mg Oral Daily    diltiaZEM  180 mg Oral Daily    isosorbide mononitrate  30 mg Oral Daily    losartan  25 mg Oral Daily    magnesium oxide  400 mg Oral Daily    metoprolol succinate  50 mg Oral Daily    warfarin  2 mg Oral Daily     sodium chloride, acetaminophen, dextrose 50%, dextrose 50%, glucagon (human recombinant), glucose, glucose, HYDROcodone-acetaminophen, melatonin, ondansetron,  oxyCODONE-acetaminophen, sodium chloride 0.9%       OBJECTIVE:     Vital Signs (Most Recent)  Temp: 97.8 °F (36.6 °C) (05/01/20 0856)  Pulse: 70 (05/01/20 1130)  Resp: 17 (05/01/20 1130)  BP: (!) 160/69 (05/01/20 1130)  SpO2: 100 % (05/01/20 1130)    Vital Signs Range (Last 24H):  Temp:  [97.8 °F (36.6 °C)]   Pulse:  [70-80]   Resp:  [17-18]   BP: (131-160)/(62-69)   SpO2:  [98 %-100 %]     Physical Exam:  General: Thin frail AA female Awake and alert. Appears comfortable  HEENT: No scleral icterus, MM moist. Edentulous  NECK:  No JVD. Supple,  No swelling, No masses.  CV:  RRR without murmurs, rubs, gallops.  PULM:  Clear without crackles, rhonchi, wheezes.  ABD:  Soft, nl BS, NT, ND.  EXTR:  No edema, clubbing, cyanosis.   NEURO: Grossly Intact.  SKIN:  No rashes, lesions.   MS:  No joint effusions.   PSYCH: Normal Mood.  Permanent HD access right arm positive thrill and bruit with coban dressing intact    Laboratory:  Recent Labs   Lab 05/01/20  0926      K 4.8      CO2 28   BUN 42*   CREATININE 4.3*   GLU 80   CALCIUM 8.2*       Recent Labs   Lab 05/01/20  0926   WBC 6.02   HGB 7.0*   HCT 23.6*              ASSESSMENT/PLAN:     1. ESRD -poor compliance, HD now with sodium thiosulfate 12.5 gm with each treatment due to calciphylaxis; renal diet; Renal dose all meds appropriate GFR  2. Anemia, transfuse 1 unit PRBC today, epo with HD, goal 10-12  3. 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 Calciphylaxis 2nd uncontrolled Phos- poor compliance, check level  5. HTN- continue home meds, UF with HD, clonidine systolic >160; goal systolic <130>90    Recommend case management/SW consult    RL Chung/Joseph Ingram M.D.

## 2020-05-01 NOTE — PLAN OF CARE
05/01/20 1350   Discharge Assessment   Assessment Type Discharge Planning Assessment   Confirmed/corrected address and phone number on facesheet? Yes   Assessment information obtained from? Patient   Expected Length of Stay (days) 1   Communicated expected length of stay with patient/caregiver yes   Prior to hospitilization cognitive status: Alert/Oriented   Prior to hospitalization functional status: Independent   Current cognitive status: Alert/Oriented   Current Functional Status: Independent   [facility]   Lives With sibling(s)   Able to Return to Prior Arrangements yes   Is patient able to care for self after discharge? Yes   Who are your caregiver(s) and their phone number(s)? Aide daigle [phone]     michell garnica [phone]   Patient's perception of discharge disposition home or selfcare   Readmission Within the Last 30 Days no previous admission in last 30 days   Patient currently being followed by outpatient case management? No   Patient currently receives any other outside agency services? Yes   Name and contact number of agency or person providing outside services [facility]   Is it the patient/care giver preference to resume care with the current outside agency? Yes   Equipment Currently Used at Home walker, rolling;wheelchair;cane, straight;hospital bed   Do you have any problems affording any of your prescribed medications? No   Is the patient taking medications as prescribed? yes   Does the patient have transportation home? Yes   Transportation Anticipated family or friend will provide   Dialysis Name and Scheduled days [facility]   Does the patient receive services at the Coumadin Clinic? No   Discharge Plan A Home with family   Discharge Plan B Home with family;Home Health   DME Needed Upon Discharge  none   Patient/Family in Agreement with Plan yes   Introduced self to patient asked if ok to take a few min of their time for short  interview for D/C planning and ok with patient

## 2020-05-01 NOTE — H&P
[facility] Medicine History & Physical Examination   Patient Name: Griselda Neely  MRN: [id number]  Patient Class: OP- Observation   Admission Date: 5/1/2020  8:57 AM  Length of Stay: 0  Attending Physician: Hola Rubio MD  Primary Care Provider: Kalie Neely MD  Face-to-Face encounter date: 05/01/2020  Code Status: DNR/DNI (Patient confirmed)  Chief Complaint: Leg Pain (left leg pain seen here for same, states has sore on thigh for months went to dialysis this am and they did not do due to her complaining of pain)        Patient information was obtained from patient, past medical records and ER records.   HISTORY OF PRESENT ILLNESS:   Griselda Neely is a 74 y.o. Black or  female who  has a past medical history of Anemia, Calciphylaxis (07/2017), CHF (congestive heart failure), Encounter for blood transfusion (03/2016), Gout, Hypertension, Mitral valve regurgitation, Osteoarthritis, Pancreatitis, Peripheral vascular disease, Peritoneal dialysis catheter in place, Pneumonia (09/09/2017), and Renal failure.. The patient presented to [facility] on 5/1/2020 with a primary complaint of Leg Pain.    History was obtained from the patient and the family present at the bedside and ER physician Sign-out. Patient has multiple admissions to our facility for missing dialysis. This time, she presents with leg pain. She was at dialysis center but to painful ulcer on the left thigh she was not able to sit on the dialysis chair and was sent here for futher evaluation. Otherwise patient denies any change in vision, hearing, headache, fever, cough, congestion, runny nose, chest pain, shortness of breath, palpitations, abdominal pain, diarrhea, constipation, vomiting, joint pain and back pain.       In the emergency room, patient CBC showed anemia with Hgb <7. CMP pending. Decision to admit was taken and patient was informed about the plan of care.   REVIEW OF  SYSTEMS:   10 Point Review of System was performed and was found to be negative except for that mentioned already in the HPI above.     PAST MEDICAL HISTORY:     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    both legs    CHF (congestive heart failure)     Encounter for blood transfusion 03/2016    Gout     Hypertension     Mitral valve regurgitation     Osteoarthritis     Pancreatitis     Peripheral vascular disease     Peritoneal dialysis catheter in place     Pneumonia 09/09/2017    Renal failure        PAST SURGICAL HISTORY:     Past Surgical History:   Procedure Laterality Date    ACHILLES TENDON SURGERY Right     ANGIOGRAPHY OF ARTERIOVENOUS SHUNT Right 1/7/2020    Procedure: Fistulogram with Possible Intervention;  Surgeon: Joseph Loyola MD;  Location: [facility] CATH/EP LAB;  Service: Cardiology;  Laterality: Right;    ANGIOGRAPHY OF LOWER EXTREMITY Left 3/18/2020    Procedure: Angiogram Extremity Unilateral;  Surgeon: Ali Khoobehi, MD;  Location: [facility] CATH/EP LAB;  Service: Cardiology;  Laterality: Left;    APPENDECTOMY      CARDIAC CATHETERIZATION  07/03/2017    CHOLECYSTECTOMY      heal surgery Right     HYSTERECTOMY      INSERTION OF STENT INTO PERIPHERAL VESSEL N/A 1/7/2020    Procedure: INSERTION, STENT, VESSEL, PERIPHERAL;  Surgeon: Joseph Loyola MD;  Location: [facility] CATH/EP LAB;  Service: Cardiology;  Laterality: N/A;    PARATHYROIDECTOMY      PARATHYROIDECTOMY  07/13/2017    PERCUTANEOUS TRANSLUMINAL ANGIOPLASTY OF ARTERIOVENOUS FISTULA N/A 1/7/2020    Procedure: PTA, AV FISTULA;  Surgeon: Joseph Loyola MD;  Location: [facility] CATH/EP LAB;  Service: Cardiology;  Laterality: N/A;    PERITONEAL CATHETER INSERTION      RENAL BIOPSY      vocal cord nodule         ALLERGIES:   Patient has no known allergies.    FAMILY HISTORY:     Family History   Problem Relation Age of Onset    Heart disease Sister     Early death Sister         heart as baby     Heart disease Maternal Grandfather     Diabetes Mother     Hypertension Mother     Heart failure Mother     Heart disease Mother     Arthritis Mother     Diabetes Father     Early death Sister         infant       SOCIAL HISTORY:     Social History     Tobacco Use    Smoking status: Current Some Day Smoker     Packs/day: 0.50     Years: 45.00     Pack years: 22.50     Types: Cigarettes    Smokeless tobacco: Never Used   Substance Use Topics    Alcohol use: No        Social History     Substance and Sexual Activity   Sexual Activity Not on file        HOME MEDICATIONS:     Prior to Admission medications    Medication Sig Start Date End Date Taking? Authorizing Provider   aspirin (ECOTRIN) 81 MG EC tablet Take 81 mg by mouth once daily.   Yes Historical Provider, MD   calcium acetate (PHOSLO) 667 mg capsule Take 667 mg by mouth once daily.    Yes Historical Provider, MD   diltiaZEM (CARDIZEM CD) 180 MG 24 hr capsule Take 180 mg by mouth once daily.   Yes Historical Provider, MD   isosorbide mononitrate (IMDUR) 30 MG 24 hr tablet Take 30 mg by mouth once daily.   Yes Historical Provider, MD   losartan (COZAAR) 25 MG tablet Take 25 mg by mouth once daily.   Yes Historical Provider, MD   magnesium oxide (MAG-OX) 400 mg (241.3 mg magnesium) tablet Take 400 mg by mouth once daily.   Yes Historical Provider, MD   metoprolol succinate (TOPROL-XL) 50 MG 24 hr tablet Take 1 tablet (50 mg total) by mouth once daily.  Patient taking differently: Take 50 mg by mouth once daily.  8/10/19 8/9/20 Yes Daren Lea MD   mupirocin (BACTROBAN) 2 % ointment Apply topically 3 (three) times daily.  Patient taking differently: Apply 1 g topically 3 (three) times daily.  2/19/20  Yes Kalie Neely MD   ondansetron (ZOFRAN-ODT) 4 MG TbDL Take 1 tablet (4 mg total) by mouth every 6 (six) hours as needed. 2/19/20  Yes Kalie Neely MD   oxyCODONE-acetaminophen (PERCOCET)  mg per tablet Take 1 tablet by  "mouth every 4 (four) hours as needed for Pain.   Yes Historical Provider, MD   traMADoL (ULTRAM) 50 mg tablet Take 50 mg by mouth every 6 (six) hours as needed for Pain.   Yes Historical Provider, MD   warfarin (COUMADIN) 1 MG tablet Take 2 tablets (2 mg total) by mouth Daily. 3/19/20 5/1/20 Yes Lissa Portillo MD   oxyCODONE-acetaminophen (PERCOCET) 5-325 mg per tablet Take 1 tablet by mouth every 12 (twelve) hours as needed for Pain.  5/1/20  Historical Provider, MD         PHYSICAL EXAM:   /62 (BP Location: Left arm, Patient Position: Sitting)   Pulse 80   Temp 97.8 °F (36.6 °C) (Temporal)   Resp 18   Ht 5' 5" (1.651 m)   Wt 65.8 kg (145 lb)   LMP  (LMP Unknown)   SpO2 100%   BMI 24.13 kg/m²   Vitals Reviewed  General appearance: Weak appearing Black or  female in mild to moderate distress due to painful ulcer  Skin: ulcer on left thigh that looks clean  Neuro: Motor and sensory exams grossly intact. Good tone. Power in all 4 extremities 5/5.   HENT: Atraumatic head. Moist mucous membranes of oral cavity.  Eyes: Normal extraocular movements.   Neck: Supple. No evidence of lymphadenopathy. No thyroidomegaly.  Lungs: Clear to auscultation bilaterally. No wheezing present.   Heart: Regular rate and rhythm. S1 and S2 present with no murmurs/gallop/rub. No pedal edema. No JVD present.   Abdomen: Soft, non-distended, non-tender. No rebound tenderness/guarding. No masses or organomegaly. Bowel sounds are normal. Bladder is not palpable.   Extremities: No cyanosis, clubbing.  Psych/mental status: Alert and oriented. Cooperative. Responds appropriately to questions.   EMERGENCY DEPARTMENT LABS AND IMAGING:     Labs Reviewed   APTT - Abnormal; Notable for the following components:       Result Value    aPTT 46.8 (*)     All other components within normal limits   CBC W/ AUTO DIFFERENTIAL - Abnormal; Notable for the following components:    RBC 2.62 (*)     Hemoglobin 7.0 (*)     Hematocrit " 23.6 (*)     Mean Corpuscular Hemoglobin 26.7 (*)     Mean Corpuscular Hemoglobin Conc 29.7 (*)     RDW 19.9 (*)     Gran% 73.4 (*)     Lymph% 17.6 (*)     All other components within normal limits   COMPREHENSIVE METABOLIC PANEL - Abnormal; Notable for the following components:    BUN, Bld 42 (*)     Creatinine 4.3 (*)     Calcium 8.2 (*)     Albumin 2.9 (*)     eGFR if  11.1 (*)     eGFR if non  9.6 (*)     All other components within normal limits   PROTIME-INR - Abnormal; Notable for the following components:    PT 19.6 (*)     All other components within normal limits   CULTURE, BLOOD   CULTURE, BLOOD   MAGNESIUM   LACTIC ACID, PLASMA   URINALYSIS, REFLEX TO URINE CULTURE   TYPE & SCREEN   PREPARE RBC SOFT       X-Ray Chest AP Portable   Final Result      1. Unchanged enlarged cardiomediastinal silhouette.   2. Unchanged bilateral interstitial thickening and right lung pleural effusion with overlying compressive atelectasis.  Findings likely reflect pulmonary edema, pleural effusion and CHF.         Electronically signed by: Yang Chamorro MD   Date:    05/01/2020   Time:    10:38          ASSESSMENT & PLAN:   Griselda Neely is a 74 y.o. female admitted for    1. ESRD M/W/F  2. Anemia of renal disease  3. Calciphylaxis  4. Left thigh Ulcer  5. Peripheral vascular disease    Plan  Nephrology consulted for dialysis  Transfusing her 1U PRBC  Wound therapy consulted for wound and pain medications ordred  Continue home medications    DVT Prophylaxis: will be placed on heparin for DVT prophylaxis and will be advised to be as mobile as possible and sit in a chair as tolerated.   ________________________________________________________________________________    Discharge Planning and Disposition: discharge tomorrow  Face-to-Face encounter date: 05/01/2020  Encounter included review of the medical records, interviewing and examining the patient face-to-face, discussion with family and  other health care providers including emergency medicine physician, admission orders, interpreting lab/test results and formulating a plan of care.   Medical Decision Making during this encounter was  [_] Low Complexity  [_] Moderate Complexity  [x] High Complexity  _________________________________________________________________________________    INPATIENT LIST OF MEDICATIONS     Current Facility-Administered Medications:     0.9%  NaCl infusion (for blood administration), , Intravenous, Q24H PRN, Hola uRbio MD    acetaminophen tablet 650 mg, 650 mg, Oral, Q4H PRN, Hola Rubio MD    aspirin EC tablet 81 mg, 81 mg, Oral, Daily, Hola Rubio MD    calcium acetate(phosphat bind) capsule 667 mg, 667 mg, Oral, Daily, Hola Rubio MD    dextrose 50% injection 12.5 g, 12.5 g, Intravenous, PRN, Hola Rubio MD    dextrose 50% injection 25 g, 25 g, Intravenous, PRN, Hola Rubio MD    diltiaZEM 24 hr capsule 180 mg, 180 mg, Oral, Daily, Hola Rubio MD    glucagon (human recombinant) injection 1 mg, 1 mg, Intramuscular, PRN, Hola Rubio MD    glucose chewable tablet 16 g, 16 g, Oral, PRN, Hola Rubio MD    glucose chewable tablet 24 g, 24 g, Oral, PRN, Hola Rubio MD    HYDROcodone-acetaminophen 5-325 mg per tablet 1 tablet, 1 tablet, Oral, Q6H PRN, Hola Rubio MD    isosorbide mononitrate 24 hr tablet 30 mg, 30 mg, Oral, Daily, Hola Rubio MD    losartan tablet 25 mg, 25 mg, Oral, Daily, Hola Rubio MD    magnesium oxide tablet 400 mg, 400 mg, Oral, Daily, Hola Rubio MD    melatonin tablet 9 mg, 9 mg, Oral, Nightly PRN, Hola Rubio MD    metoprolol succinate (TOPROL-XL) 24 hr tablet 50 mg, 50 mg, Oral, Daily, Hola Rubio MD    ondansetron disintegrating tablet 8 mg, 8 mg, Oral, Q8H PRN, Hola Rubio MD    oxyCODONE-acetaminophen  mg per tablet 1 tablet, 1 tablet,  Oral, Q8H PRN, Hola Rubio MD    sodium chloride 0.9% flush 3 mL, 3 mL, Intravenous, Q6H PRN, Hola Rubio MD    warfarin (COUMADIN) tablet 2 mg, 2 mg, Oral, Daily, Hola Rubio MD    Current Outpatient Medications:     aspirin (ECOTRIN) 81 MG EC tablet, Take 81 mg by mouth once daily., Disp: , Rfl:     calcium acetate (PHOSLO) 667 mg capsule, Take 667 mg by mouth once daily. , Disp: , Rfl:     diltiaZEM (CARDIZEM CD) 180 MG 24 hr capsule, Take 180 mg by mouth once daily., Disp: , Rfl:     isosorbide mononitrate (IMDUR) 30 MG 24 hr tablet, Take 30 mg by mouth once daily., Disp: , Rfl:     losartan (COZAAR) 25 MG tablet, Take 25 mg by mouth once daily., Disp: , Rfl:     magnesium oxide (MAG-OX) 400 mg (241.3 mg magnesium) tablet, Take 400 mg by mouth once daily., Disp: , Rfl:     metoprolol succinate (TOPROL-XL) 50 MG 24 hr tablet, Take 1 tablet (50 mg total) by mouth once daily. (Patient taking differently: Take 50 mg by mouth once daily. ), Disp: 30 tablet, Rfl: 11    mupirocin (BACTROBAN) 2 % ointment, Apply topically 3 (three) times daily. (Patient taking differently: Apply 1 g topically 3 (three) times daily. ), Disp: 30 g, Rfl: 2    ondansetron (ZOFRAN-ODT) 4 MG TbDL, Take 1 tablet (4 mg total) by mouth every 6 (six) hours as needed., Disp: 60 tablet, Rfl: 4    oxyCODONE-acetaminophen (PERCOCET)  mg per tablet, Take 1 tablet by mouth every 4 (four) hours as needed for Pain., Disp: , Rfl:     traMADoL (ULTRAM) 50 mg tablet, Take 50 mg by mouth every 6 (six) hours as needed for Pain., Disp: , Rfl:     warfarin (COUMADIN) 1 MG tablet, Take 2 tablets (2 mg total) by mouth Daily., Disp: 30 tablet, Rfl: 1      Scheduled Meds:   aspirin  81 mg Oral Daily    calcium acetate(phosphat bind)  667 mg Oral Daily    diltiaZEM  180 mg Oral Daily    isosorbide mononitrate  30 mg Oral Daily    losartan  25 mg Oral Daily    magnesium oxide  400 mg Oral Daily    metoprolol  succinate  50 mg Oral Daily    warfarin  2 mg Oral Daily     Continuous Infusions:  PRN Meds:.sodium chloride, acetaminophen, dextrose 50%, dextrose 50%, glucagon (human recombinant), glucose, glucose, HYDROcodone-acetaminophen, melatonin, ondansetron, oxyCODONE-acetaminophen, sodium chloride 0.9%      Hola Rubio  [facility] Hospitalist  05/01/2020

## 2020-05-02 VITALS
SYSTOLIC BLOOD PRESSURE: 160 MMHG | WEIGHT: 145 LBS | HEIGHT: 65 IN | OXYGEN SATURATION: 99 % | DIASTOLIC BLOOD PRESSURE: 83 MMHG | RESPIRATION RATE: 18 BRPM | TEMPERATURE: 99 F | BODY MASS INDEX: 24.16 KG/M2 | HEART RATE: 117 BPM

## 2020-05-02 LAB
ALBUMIN SERPL BCP-MCNC: 2.7 G/DL (ref 3.5–5.2)
ALBUMIN SERPL BCP-MCNC: 2.9 G/DL (ref 3.5–5.2)
ALP SERPL-CCNC: 98 U/L (ref 55–135)
ALT SERPL W/O P-5'-P-CCNC: 9 U/L (ref 10–44)
ANION GAP SERPL CALC-SCNC: 10 MMOL/L (ref 8–16)
ANION GAP SERPL CALC-SCNC: 9 MMOL/L (ref 8–16)
AST SERPL-CCNC: 15 U/L (ref 10–40)
BASOPHILS # BLD AUTO: 0.02 K/UL (ref 0–0.2)
BASOPHILS NFR BLD: 0.4 % (ref 0–1.9)
BILIRUB SERPL-MCNC: 0.9 MG/DL (ref 0.1–1)
BUN SERPL-MCNC: 28 MG/DL (ref 8–23)
BUN SERPL-MCNC: 28 MG/DL (ref 8–23)
CALCIUM SERPL-MCNC: 8.2 MG/DL (ref 8.7–10.5)
CALCIUM SERPL-MCNC: 8.3 MG/DL (ref 8.7–10.5)
CHLORIDE SERPL-SCNC: 99 MMOL/L (ref 95–110)
CHLORIDE SERPL-SCNC: 99 MMOL/L (ref 95–110)
CO2 SERPL-SCNC: 30 MMOL/L (ref 23–29)
CO2 SERPL-SCNC: 30 MMOL/L (ref 23–29)
CREAT SERPL-MCNC: 3.5 MG/DL (ref 0.5–1.4)
CREAT SERPL-MCNC: 3.6 MG/DL (ref 0.5–1.4)
DIFFERENTIAL METHOD: ABNORMAL
EOSINOPHIL # BLD AUTO: 0 K/UL (ref 0–0.5)
EOSINOPHIL NFR BLD: 0.7 % (ref 0–8)
ERYTHROCYTE [DISTWIDTH] IN BLOOD BY AUTOMATED COUNT: 19 % (ref 11.5–14.5)
EST. GFR  (AFRICAN AMERICAN): 13.7 ML/MIN/1.73 M^2
EST. GFR  (AFRICAN AMERICAN): 14.2 ML/MIN/1.73 M^2
EST. GFR  (NON AFRICAN AMERICAN): 11.9 ML/MIN/1.73 M^2
EST. GFR  (NON AFRICAN AMERICAN): 12.3 ML/MIN/1.73 M^2
GLUCOSE SERPL-MCNC: 66 MG/DL (ref 70–110)
GLUCOSE SERPL-MCNC: 67 MG/DL (ref 70–110)
HCT VFR BLD AUTO: 27.9 % (ref 37–48.5)
HGB BLD-MCNC: 8.5 G/DL (ref 12–16)
IMM GRANULOCYTES # BLD AUTO: 0.02 K/UL (ref 0–0.04)
IMM GRANULOCYTES NFR BLD AUTO: 0.4 % (ref 0–0.5)
INR PPP: 1.6
LYMPHOCYTES # BLD AUTO: 0.9 K/UL (ref 1–4.8)
LYMPHOCYTES NFR BLD: 16.2 % (ref 18–48)
MAGNESIUM SERPL-MCNC: 1.9 MG/DL (ref 1.6–2.6)
MCH RBC QN AUTO: 27.6 PG (ref 27–31)
MCHC RBC AUTO-ENTMCNC: 30.5 G/DL (ref 32–36)
MCV RBC AUTO: 91 FL (ref 82–98)
MONOCYTES # BLD AUTO: 0.5 K/UL (ref 0.3–1)
MONOCYTES NFR BLD: 9.1 % (ref 4–15)
NEUTROPHILS # BLD AUTO: 4.1 K/UL (ref 1.8–7.7)
NEUTROPHILS NFR BLD: 73.2 % (ref 38–73)
NRBC BLD-RTO: 0 /100 WBC
PHOSPHATE SERPL-MCNC: 2.7 MG/DL (ref 2.7–4.5)
PLATELET # BLD AUTO: 198 K/UL (ref 150–350)
PMV BLD AUTO: 11.2 FL (ref 9.2–12.9)
POTASSIUM SERPL-SCNC: 4.7 MMOL/L (ref 3.5–5.1)
POTASSIUM SERPL-SCNC: 4.7 MMOL/L (ref 3.5–5.1)
PROT SERPL-MCNC: 7.6 G/DL (ref 6–8.4)
PROTHROMBIN TIME: 18.5 SEC (ref 10.6–14.8)
RBC # BLD AUTO: 3.08 M/UL (ref 4–5.4)
SODIUM SERPL-SCNC: 138 MMOL/L (ref 136–145)
SODIUM SERPL-SCNC: 139 MMOL/L (ref 136–145)
WBC # BLD AUTO: 5.61 K/UL (ref 3.9–12.7)

## 2020-05-02 PROCEDURE — 85610 PROTHROMBIN TIME: CPT

## 2020-05-02 PROCEDURE — 94761 N-INVAS EAR/PLS OXIMETRY MLT: CPT

## 2020-05-02 PROCEDURE — G0378 HOSPITAL OBSERVATION PER HR: HCPCS

## 2020-05-02 PROCEDURE — 85025 COMPLETE CBC W/AUTO DIFF WBC: CPT

## 2020-05-02 PROCEDURE — 83735 ASSAY OF MAGNESIUM: CPT

## 2020-05-02 PROCEDURE — 99900035 HC TECH TIME PER 15 MIN (STAT)

## 2020-05-02 PROCEDURE — 80053 COMPREHEN METABOLIC PANEL: CPT

## 2020-05-02 PROCEDURE — 80069 RENAL FUNCTION PANEL: CPT | Mod: XB

## 2020-05-02 PROCEDURE — 27000221 HC OXYGEN, UP TO 24 HOURS

## 2020-05-02 PROCEDURE — 36415 COLL VENOUS BLD VENIPUNCTURE: CPT

## 2020-05-02 PROCEDURE — 25000003 PHARM REV CODE 250: Performed by: INTERNAL MEDICINE

## 2020-05-02 RX ADMIN — MAGNESIUM OXIDE 400 MG: 400 TABLET ORAL at 10:05

## 2020-05-02 RX ADMIN — ISOSORBIDE MONONITRATE 30 MG: 30 TABLET, EXTENDED RELEASE ORAL at 10:05

## 2020-05-02 RX ADMIN — DILTIAZEM HYDROCHLORIDE 180 MG: 180 CAPSULE, COATED, EXTENDED RELEASE ORAL at 10:05

## 2020-05-02 RX ADMIN — LOSARTAN POTASSIUM 25 MG: 25 TABLET, FILM COATED ORAL at 10:05

## 2020-05-02 RX ADMIN — ASPIRIN 81 MG: 81 TABLET, DELAYED RELEASE ORAL at 10:05

## 2020-05-02 RX ADMIN — METOPROLOL SUCCINATE 50 MG: 50 TABLET, FILM COATED, EXTENDED RELEASE ORAL at 10:05

## 2020-05-02 NOTE — DISCHARGE SUMMARY
[facility]  Discharge Summary  Patient Name: Griselda Neely MRN: [id number]   Patient Class: OP- Observation  Length of Stay: 0   Admission Date: 5/1/2020  8:57 AM Attending Physician: Hola Rubio MD   Primary Care Provider: Kalie Neely MD Face-to-Face encounter date: 05/02/2020   Chief Complaint: Leg Pain (left leg pain seen here for same, states has sore on thigh for months went to dialysis this am and they did not do due to her complaining of pain)    Date of Discharge: 5/2/2020  Discharge Disposition: Home  Condition: Stable    Reason for Hospitalization   Primary Diagnosis: ESRD    Secondary Diagnosis: Calciphylaxis, Anemia of renal disease, peripheral vascular disease      Patient Active Problem List   Diagnosis    HTN (hypertension)    PND (paroxysmal nocturnal dyspnea)    Tobacco dependence    ESRD (end stage renal disease) on HD M,W, F    Hyperparathyroidism    Other persistent atrial fibrillation    Anemia due to chronic kidney disease    Pulmonary hypertension    Non-rheumatic mitral regurgitation    DNR (do not resuscitate)    Chronic respiratory failure    Coronary arteriosclerosis in native artery    Gastroesophageal reflux disease    S/P parathyroidectomy    Tension-type headache    Hyperparathyroidism due to renal insufficiency    Functional dyspepsia    HLD (hyperlipidemia)    PVD (peripheral vascular disease)    Left atrial thrombus    Encephalopathy    Chronic systolic heart failure    Peripheral vascular disease now with left lower extremity occlusion    H/O mitral valve replacement    Gout    Anemia    Chronic pain on chronic narcotics    Left leg pain       Brief History of Present Illness    Griselda Neely is a 74 y.o.  female who  has a past medical history of Anemia, Calciphylaxis (07/2017), CHF (congestive heart failure), Encounter for blood transfusion (03/2016), Gout, Hypertension, Mitral valve regurgitation,  "Osteoarthritis, Pancreatitis, Peripheral vascular disease, Peritoneal dialysis catheter in place, Pneumonia (09/09/2017), and Renal failure.. The patient presented to [facility] on 5/1/2020 with a primary complaint of Leg Pain (left leg pain seen here for same, states has sore on thigh for months went to dialysis this am and they did not do due to her complaining of pain)      For the full HPI please refer to the History & Physical from this admission.    Hospital Course By Problem with Pertinent Findings     Patient was admitted for missing dialysis session. She received dialysis. For her wound, wound therapy was consulted and pain was managed with pain medications. She was discharged home in stable condition after getting dialysis and 1U PRBC.      Physical Exam  BP (!) 160/83   Pulse (!) 117   Temp 99.4 °F (37.4 °C)   Resp 18   Ht 5' 5" (1.651 m)   Wt 65.8 kg (145 lb)   LMP  (LMP Unknown)   SpO2 99%   Breastfeeding? No   BMI 24.13 kg/m²   Vitals reviewed.    Constitutional: No distress.   HENT: Atraumatic.   Cardiovascular: Normal rate, regular rhythm and normal heart sounds.   Pulmonary/Chest: Effort normal. Clear to auscultation bilaterally. No wheezes.   Abdominal: Soft. Bowel sounds are normal. Exhibits no distension and no mass. No tenderness  Neurological: Alert.   Skin: Skin is warm and dry.     Following labs were Reviewed   Recent Labs   Lab 05/02/20  0503 05/02/20  0542   WBC 5.61  --    HGB 8.5*  --    HCT 27.9*  --      --    CALCIUM 8.3* 8.2*   ALBUMIN 2.9* 2.7*   PROT 7.6  --     139   K 4.7 4.7   CO2 30* 30*   CL 99 99   BUN 28* 28*   CREATININE 3.6* 3.5*   ALKPHOS 98  --    ALT 9*  --    AST 15  --    BILITOT 0.9  --      No results found for: POCTGLUCOSE     All labs within the past 24 hours have been reviewed    Microbiology Results (last 7 days)     Procedure Component Value Units Date/Time    Blood culture #1 **CANNOT BE ORDERED STAT** [[id number]] " Collected:  05/01/20 0945    Order Status:  Completed Specimen:  Blood from Peripheral, Forearm, Left Updated:  05/02/20 1032     Blood Culture, Routine No Growth to date      No Growth to date    Blood culture #2 **CANNOT BE ORDERED STAT** [[id number]] Collected:  05/01/20 1000    Order Status:  Completed Specimen:  Blood from Peripheral, Hand, Left Updated:  05/02/20 1032     Blood Culture, Routine No Growth to date      No Growth to date        X-Ray Chest AP Portable   Final Result      1. Unchanged enlarged cardiomediastinal silhouette.   2. Unchanged bilateral interstitial thickening and right lung pleural effusion with overlying compressive atelectasis.  Findings likely reflect pulmonary edema, pleural effusion and CHF.         Electronically signed by: Yang Chamorro MD   Date:    05/01/2020   Time:    10:38          No results found for this or any previous visit.      Consultants and Procedures   Consultants:  Nephrology    Procedures:   None    Discharge Information:   Diet:  Resume regular diet    Physical Activity:  Activity as tolerated    Instructions:  1. Take all medications as prescribed  2. Keep all follow-up appointments  3. Return to the hospital or call your primary care physicians if any worsening symptoms such as chest pain, shortness of breath occur.      Follow-Up Appointments:  1. Please call your primary care physician to schedule an appointment in 1 week time.    Pending laboratory work/Tests to be performed/followed by the Primary care Physician: None    The patient was discharged in the care of her parents//wife/family/caregiver, with discharge instructions were reviewed in written and verbal form. All pertinent questions were discussed and prescriptions were provided. The importance of making follow up appointments and compliance of medications has been stressed repeatedly. The patient will follow up in 1 week or sooner as needed with the PCP, and the patient is on board with  the plan. Upon discharge, patient needs to be on following medications.    Discharge Medications:     Medication List      CHANGE how you take these medications    mupirocin 2 % ointment  Commonly known as:  BACTROBAN  Apply topically 3 (three) times daily.  What changed:  how much to take        CONTINUE taking these medications    aspirin 81 MG EC tablet  Commonly known as:  ECOTRIN     calcium acetate(phosphat bind) 667 mg capsule  Commonly known as:  PHOSLO     diltiaZEM 180 MG 24 hr capsule  Commonly known as:  CARDIZEM CD     isosorbide mononitrate 30 MG 24 hr tablet  Commonly known as:  IMDUR     losartan 25 MG tablet  Commonly known as:  COZAAR     magnesium oxide 400 mg (241.3 mg magnesium) tablet  Commonly known as:  MAG-OX     metoprolol succinate 50 MG 24 hr tablet  Commonly known as:  TOPROL-XL  Take 1 tablet (50 mg total) by mouth once daily.     ondansetron 4 MG Tbdl  Commonly known as:  ZOFRAN-ODT  Take 1 tablet (4 mg total) by mouth every 6 (six) hours as needed.     oxyCODONE-acetaminophen  mg per tablet  Commonly known as:  PERCOCET     warfarin 1 MG tablet  Commonly known as:  COUMADIN  Take 2 tablets (2 mg total) by mouth Daily.        STOP taking these medications    traMADoL 50 mg tablet  Commonly known as:  ULTRAM              I spent 30 minutes preparing the discharge including reviewing records from previous encounters, preparation of discharge summary, assessing and final examination of the patient, discharge medicine reconciliation, discussing plan of care, follow up and education and prescriptions.       Hola Rubio  [facility] Hospitalist  05/02/2020

## 2020-05-02 NOTE — PLAN OF CARE
05/02/20 0752   Patient Assessment/Suction   Level of Consciousness (AVPU) alert   Respiratory Effort Normal;Unlabored   Expansion/Accessory Muscles/Retractions no use of accessory muscles;no retractions   Rhythm/Pattern, Respiratory unlabored;pattern regular;depth regular   PRE-TX-O2   O2 Device (Oxygen Therapy) nasal cannula   $ Is the patient on Low Flow Oxygen? Yes   Flow (L/min) 3   SpO2 100 %   Pulse Oximetry Type Intermittent   $ Pulse Oximetry - Multiple Charge Pulse Oximetry - Multiple   Pulse 64   Resp 18   Respiratory Evaluation   $ Care Plan Tech Time 15 min   Evaluation For   (care plan)

## 2020-05-06 LAB
BACTERIA BLD CULT: NORMAL
BACTERIA BLD CULT: NORMAL

## 2020-05-09 ENCOUNTER — HOSPITAL ENCOUNTER (INPATIENT)
Facility: HOSPITAL | Age: 75
LOS: 3 days | Discharge: NURSING, REHAB OR LONG-TERM CARE | DRG: 291 | End: 2020-05-12
Attending: EMERGENCY MEDICINE | Admitting: INTERNAL MEDICINE
Payer: MEDICARE

## 2020-05-09 DIAGNOSIS — N18.6 ESRD (END STAGE RENAL DISEASE): Chronic | ICD-10-CM

## 2020-05-09 DIAGNOSIS — J81.0 ACUTE PULMONARY EDEMA: ICD-10-CM

## 2020-05-09 DIAGNOSIS — R06.02 SHORTNESS OF BREATH: ICD-10-CM

## 2020-05-09 DIAGNOSIS — I48.91 ATRIAL FIBRILLATION WITH RVR: Primary | ICD-10-CM

## 2020-05-09 LAB
ABO + RH BLD: NORMAL
ALBUMIN SERPL BCP-MCNC: 3 G/DL (ref 3.5–5.2)
ALLENS TEST: ABNORMAL
ALP SERPL-CCNC: 117 U/L (ref 55–135)
ALT SERPL W/O P-5'-P-CCNC: 10 U/L (ref 10–44)
ANION GAP SERPL CALC-SCNC: 10 MMOL/L (ref 8–16)
APTT PPP: 41.7 SEC (ref 23.6–33.3)
AST SERPL-CCNC: 17 U/L (ref 10–40)
BASOPHILS # BLD AUTO: 0.02 K/UL (ref 0–0.2)
BASOPHILS NFR BLD: 0.3 % (ref 0–1.9)
BILIRUB SERPL-MCNC: 0.6 MG/DL (ref 0.1–1)
BLD GP AB SCN CELLS X3 SERPL QL: NORMAL
BNP SERPL-MCNC: >4500 PG/ML (ref 0–99)
BUN SERPL-MCNC: 36 MG/DL (ref 8–23)
CALCIUM SERPL-MCNC: 8.2 MG/DL (ref 8.7–10.5)
CHLORIDE SERPL-SCNC: 102 MMOL/L (ref 95–110)
CK MB SERPL-MCNC: 1.4 NG/ML (ref 0.1–6.5)
CK SERPL-CCNC: 34 U/L (ref 20–180)
CO2 SERPL-SCNC: 27 MMOL/L (ref 23–29)
CREAT SERPL-MCNC: 4.6 MG/DL (ref 0.5–1.4)
DELSYS: ABNORMAL
DIFFERENTIAL METHOD: ABNORMAL
EOSINOPHIL # BLD AUTO: 0 K/UL (ref 0–0.5)
EOSINOPHIL NFR BLD: 0.4 % (ref 0–8)
EP: 8
ERYTHROCYTE [DISTWIDTH] IN BLOOD BY AUTOMATED COUNT: 19.4 % (ref 11.5–14.5)
ERYTHROCYTE [SEDIMENTATION RATE] IN BLOOD BY WESTERGREN METHOD: 18 MM/H
EST. GFR  (AFRICAN AMERICAN): 10.2 ML/MIN/1.73 M^2
EST. GFR  (NON AFRICAN AMERICAN): 8.9 ML/MIN/1.73 M^2
FIO2: 80
GLUCOSE SERPL-MCNC: 110 MG/DL (ref 70–110)
GLUCOSE SERPL-MCNC: 116 MG/DL (ref 70–110)
HCO3 UR-SCNC: 29.6 MMOL/L (ref 24–28)
HCT VFR BLD AUTO: 26.5 % (ref 37–48.5)
HCT VFR BLD CALC: 24 %PCV (ref 36–54)
HGB BLD-MCNC: 7.9 G/DL (ref 12–16)
IMM GRANULOCYTES # BLD AUTO: 0.05 K/UL (ref 0–0.04)
IMM GRANULOCYTES NFR BLD AUTO: 0.7 % (ref 0–0.5)
INR PPP: 1.7
IP: 12
LYMPHOCYTES # BLD AUTO: 0.8 K/UL (ref 1–4.8)
LYMPHOCYTES NFR BLD: 11.2 % (ref 18–48)
MAGNESIUM SERPL-MCNC: 1.9 MG/DL (ref 1.6–2.6)
MCH RBC QN AUTO: 28 PG (ref 27–31)
MCHC RBC AUTO-ENTMCNC: 29.8 G/DL (ref 32–36)
MCV RBC AUTO: 94 FL (ref 82–98)
MIN VOL: 9.6
MODE: ABNORMAL
MONOCYTES # BLD AUTO: 0.6 K/UL (ref 0.3–1)
MONOCYTES NFR BLD: 9.1 % (ref 4–15)
NEUTROPHILS # BLD AUTO: 5.3 K/UL (ref 1.8–7.7)
NEUTROPHILS NFR BLD: 78.3 % (ref 38–73)
NRBC BLD-RTO: 0 /100 WBC
PCO2 BLDA: 47.7 MMHG (ref 35–45)
PH SMN: 7.4 [PH] (ref 7.35–7.45)
PLATELET # BLD AUTO: 268 K/UL (ref 150–350)
PMV BLD AUTO: 10.9 FL (ref 9.2–12.9)
PO2 BLDA: 390 MMHG (ref 80–100)
POC BE: 5 MMOL/L
POC IONIZED CALCIUM: 1.08 MMOL/L (ref 1.06–1.42)
POC SATURATED O2: 100 % (ref 95–100)
POC TCO2: 31 MMOL/L (ref 23–27)
POTASSIUM BLD-SCNC: 4.8 MMOL/L (ref 3.5–5.1)
POTASSIUM SERPL-SCNC: 5.3 MMOL/L (ref 3.5–5.1)
PROT SERPL-MCNC: 8.1 G/DL (ref 6–8.4)
PROTHROMBIN TIME: 19.2 SEC (ref 10.6–14.8)
RBC # BLD AUTO: 2.82 M/UL (ref 4–5.4)
SAMPLE: ABNORMAL
SARS-COV-2 RDRP RESP QL NAA+PROBE: NEGATIVE
SITE: ABNORMAL
SODIUM BLD-SCNC: 138 MMOL/L (ref 136–145)
SODIUM SERPL-SCNC: 139 MMOL/L (ref 136–145)
SP02: 100
TROPONIN I SERPL DL<=0.01 NG/ML-MCNC: <0.03 NG/ML
WBC # BLD AUTO: 6.79 K/UL (ref 3.9–12.7)

## 2020-05-09 PROCEDURE — 86901 BLOOD TYPING SEROLOGIC RH(D): CPT

## 2020-05-09 PROCEDURE — 83735 ASSAY OF MAGNESIUM: CPT

## 2020-05-09 PROCEDURE — 85014 HEMATOCRIT: CPT

## 2020-05-09 PROCEDURE — 25000003 PHARM REV CODE 250: Performed by: EMERGENCY MEDICINE

## 2020-05-09 PROCEDURE — 90935 HEMODIALYSIS ONE EVALUATION: CPT

## 2020-05-09 PROCEDURE — 82553 CREATINE MB FRACTION: CPT

## 2020-05-09 PROCEDURE — 82803 BLOOD GASES ANY COMBINATION: CPT

## 2020-05-09 PROCEDURE — 85730 THROMBOPLASTIN TIME PARTIAL: CPT

## 2020-05-09 PROCEDURE — 99900035 HC TECH TIME PER 15 MIN (STAT)

## 2020-05-09 PROCEDURE — 94761 N-INVAS EAR/PLS OXIMETRY MLT: CPT

## 2020-05-09 PROCEDURE — 84484 ASSAY OF TROPONIN QUANT: CPT

## 2020-05-09 PROCEDURE — 82330 ASSAY OF CALCIUM: CPT

## 2020-05-09 PROCEDURE — 82550 ASSAY OF CK (CPK): CPT

## 2020-05-09 PROCEDURE — 99291 CRITICAL CARE FIRST HOUR: CPT

## 2020-05-09 PROCEDURE — 96366 THER/PROPH/DIAG IV INF ADDON: CPT

## 2020-05-09 PROCEDURE — 93005 ELECTROCARDIOGRAM TRACING: CPT | Performed by: INTERNAL MEDICINE

## 2020-05-09 PROCEDURE — 20000000 HC ICU ROOM

## 2020-05-09 PROCEDURE — 94660 CPAP INITIATION&MGMT: CPT

## 2020-05-09 PROCEDURE — 83880 ASSAY OF NATRIURETIC PEPTIDE: CPT

## 2020-05-09 PROCEDURE — 80053 COMPREHEN METABOLIC PANEL: CPT

## 2020-05-09 PROCEDURE — 21400001 HC TELEMETRY ROOM

## 2020-05-09 PROCEDURE — 25000003 PHARM REV CODE 250: Performed by: INTERNAL MEDICINE

## 2020-05-09 PROCEDURE — 85025 COMPLETE CBC W/AUTO DIFF WBC: CPT

## 2020-05-09 PROCEDURE — 84132 ASSAY OF SERUM POTASSIUM: CPT

## 2020-05-09 PROCEDURE — 96365 THER/PROPH/DIAG IV INF INIT: CPT

## 2020-05-09 PROCEDURE — 84295 ASSAY OF SERUM SODIUM: CPT

## 2020-05-09 PROCEDURE — 36600 WITHDRAWAL OF ARTERIAL BLOOD: CPT

## 2020-05-09 PROCEDURE — 85610 PROTHROMBIN TIME: CPT

## 2020-05-09 PROCEDURE — U0002 COVID-19 LAB TEST NON-CDC: HCPCS

## 2020-05-09 RX ORDER — DILTIAZEM HYDROCHLORIDE 5 MG/ML
10 INJECTION INTRAVENOUS
Status: COMPLETED | OUTPATIENT
Start: 2020-05-09 | End: 2020-05-09

## 2020-05-09 RX ORDER — OXYCODONE AND ACETAMINOPHEN 10; 325 MG/1; MG/1
1 TABLET ORAL EVERY 6 HOURS PRN
Status: DISCONTINUED | OUTPATIENT
Start: 2020-05-09 | End: 2020-05-13 | Stop reason: HOSPADM

## 2020-05-09 RX ORDER — LANOLIN ALCOHOL/MO/W.PET/CERES
400 CREAM (GRAM) TOPICAL DAILY
Status: DISCONTINUED | OUTPATIENT
Start: 2020-05-10 | End: 2020-05-13 | Stop reason: HOSPADM

## 2020-05-09 RX ORDER — SODIUM CHLORIDE 9 MG/ML
INJECTION, SOLUTION INTRAVENOUS ONCE
Status: DISCONTINUED | OUTPATIENT
Start: 2020-05-10 | End: 2020-05-13 | Stop reason: HOSPADM

## 2020-05-09 RX ORDER — GLUCAGON 1 MG
1 KIT INJECTION
Status: DISCONTINUED | OUTPATIENT
Start: 2020-05-09 | End: 2020-05-13 | Stop reason: HOSPADM

## 2020-05-09 RX ORDER — SODIUM CHLORIDE 0.9 % (FLUSH) 0.9 %
10 SYRINGE (ML) INJECTION
Status: DISCONTINUED | OUTPATIENT
Start: 2020-05-09 | End: 2020-05-13 | Stop reason: HOSPADM

## 2020-05-09 RX ORDER — METOPROLOL SUCCINATE 50 MG/1
50 TABLET, EXTENDED RELEASE ORAL DAILY
Status: DISCONTINUED | OUTPATIENT
Start: 2020-05-10 | End: 2020-05-13 | Stop reason: HOSPADM

## 2020-05-09 RX ORDER — MUPIROCIN 20 MG/G
1 OINTMENT TOPICAL 3 TIMES DAILY
Status: DISCONTINUED | OUTPATIENT
Start: 2020-05-10 | End: 2020-05-12

## 2020-05-09 RX ORDER — IBUPROFEN 200 MG
16 TABLET ORAL
Status: DISCONTINUED | OUTPATIENT
Start: 2020-05-09 | End: 2020-05-13 | Stop reason: HOSPADM

## 2020-05-09 RX ORDER — DILTIAZEM HCL 1 MG/ML
5 INJECTION, SOLUTION INTRAVENOUS CONTINUOUS
Status: DISCONTINUED | OUTPATIENT
Start: 2020-05-09 | End: 2020-05-11 | Stop reason: HOSPADM

## 2020-05-09 RX ORDER — IBUPROFEN 200 MG
24 TABLET ORAL
Status: DISCONTINUED | OUTPATIENT
Start: 2020-05-09 | End: 2020-05-13 | Stop reason: HOSPADM

## 2020-05-09 RX ORDER — ISOSORBIDE MONONITRATE 30 MG/1
30 TABLET, EXTENDED RELEASE ORAL DAILY
Status: DISCONTINUED | OUTPATIENT
Start: 2020-05-10 | End: 2020-05-13 | Stop reason: HOSPADM

## 2020-05-09 RX ORDER — ASPIRIN 81 MG/1
81 TABLET ORAL DAILY
Status: DISCONTINUED | OUTPATIENT
Start: 2020-05-10 | End: 2020-05-13 | Stop reason: HOSPADM

## 2020-05-09 RX ORDER — SODIUM CHLORIDE 9 MG/ML
INJECTION, SOLUTION INTRAVENOUS
Status: DISCONTINUED | OUTPATIENT
Start: 2020-05-09 | End: 2020-05-13 | Stop reason: HOSPADM

## 2020-05-09 RX ORDER — CALCIUM ACETATE 667 MG/1
667 CAPSULE ORAL DAILY
Status: DISCONTINUED | OUTPATIENT
Start: 2020-05-10 | End: 2020-05-13 | Stop reason: HOSPADM

## 2020-05-09 RX ORDER — MORPHINE SULFATE 2 MG/ML
2 INJECTION, SOLUTION INTRAMUSCULAR; INTRAVENOUS EVERY 6 HOURS PRN
Status: DISCONTINUED | OUTPATIENT
Start: 2020-05-10 | End: 2020-05-13 | Stop reason: HOSPADM

## 2020-05-09 RX ORDER — DILTIAZEM HCL/D5W 125 MG/125
5 PLASTIC BAG, INJECTION (ML) INTRAVENOUS CONTINUOUS
Status: DISCONTINUED | OUTPATIENT
Start: 2020-05-09 | End: 2020-05-11 | Stop reason: HOSPADM

## 2020-05-09 RX ADMIN — OXYCODONE HYDROCHLORIDE AND ACETAMINOPHEN 1 TABLET: 10; 325 TABLET ORAL at 10:05

## 2020-05-09 RX ADMIN — DILTIAZEM HYDROCHLORIDE 10 MG/HR: 5 INJECTION INTRAVENOUS at 10:05

## 2020-05-09 RX ADMIN — DILTIAZEM HYDROCHLORIDE 7.5 MG/HR: 5 INJECTION INTRAVENOUS at 07:05

## 2020-05-09 RX ADMIN — DILTIAZEM HYDROCHLORIDE 10 MG: 5 INJECTION INTRAVENOUS at 07:05

## 2020-05-09 NOTE — ED TRIAGE NOTES
Admit per [facility] to ER 7, 73 yo female with c/o progressively worsening SOB today. Had dialysis yesterday. Denies CP.

## 2020-05-09 NOTE — ED PROVIDER NOTES
Encounter Date: 5/9/2020       History   No chief complaint on file.    Patient with a complex past medical history including atrial fibrillation, end-stage renal disease.  She is on hemodialysis Monday Wednesday Friday.  History of calciphylaxis of both legs.  Patient noted to be very short of breath today.  EMS arrived and she noted to be in atrial fibrillation with rapid ventricular rate.  Patient denies any chest pain.  No fever or chills.  No pleurisy hemoptysis.  No relieving factors at home.  Symptoms are severe in nature and continue in the emergency department.        Review of patient's allergies indicates:  No Known Allergies  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    both legs    CHF (congestive heart failure)     Encounter for blood transfusion 03/2016    Gout     Hypertension     Mitral valve regurgitation     Osteoarthritis     Pancreatitis     Peripheral vascular disease     Peritoneal dialysis catheter in place     Pneumonia 09/09/2017    Renal failure      Past Surgical History:   Procedure Laterality Date    ACHILLES TENDON SURGERY Right     ANGIOGRAPHY OF ARTERIOVENOUS SHUNT Right 1/7/2020    Procedure: Fistulogram with Possible Intervention;  Surgeon: Joseph Loyola MD;  Location: [facility] CATH/EP LAB;  Service: Cardiology;  Laterality: Right;    ANGIOGRAPHY OF LOWER EXTREMITY Left 3/18/2020    Procedure: Angiogram Extremity Unilateral;  Surgeon: Ali Khoobehi, MD;  Location: [facility] CATH/EP LAB;  Service: Cardiology;  Laterality: Left;    APPENDECTOMY      CARDIAC CATHETERIZATION  07/03/2017    CHOLECYSTECTOMY      heal surgery Right     HYSTERECTOMY      INSERTION OF STENT INTO PERIPHERAL VESSEL N/A 1/7/2020    Procedure: INSERTION, STENT, VESSEL, PERIPHERAL;  Surgeon: Joseph Loyola MD;  Location: [facility] CATH/EP LAB;  Service: Cardiology;  Laterality: N/A;    PARATHYROIDECTOMY      PARATHYROIDECTOMY  07/13/2017    PERCUTANEOUS  TRANSLUMINAL ANGIOPLASTY OF ARTERIOVENOUS FISTULA N/A 1/7/2020    Procedure: PTA, AV FISTULA;  Surgeon: Joseph Loyola MD;  Location: [facility] CATH/EP LAB;  Service: Cardiology;  Laterality: N/A;    PERITONEAL CATHETER INSERTION      RENAL BIOPSY      vocal cord nodule       Family History   Problem Relation Age of Onset    Heart disease Sister     Early death Sister         heart as baby    Heart disease Maternal Grandfather     Diabetes Mother     Hypertension Mother     Heart failure Mother     Heart disease Mother     Arthritis Mother     Diabetes Father     Early death Sister         infant     Social History     Tobacco Use    Smoking status: Current Some Day Smoker     Packs/day: 0.50     Years: 45.00     Pack years: 22.50     Types: Cigarettes    Smokeless tobacco: Never Used   Substance Use Topics    Alcohol use: No    Drug use: No     Review of Systems   Constitutional: Negative for chills and fever.   HENT: Negative for congestion.    Eyes: Negative for visual disturbance.   Respiratory: Positive for shortness of breath.    Cardiovascular: Negative for chest pain.   Gastrointestinal: Negative for abdominal pain and vomiting.   Genitourinary: Negative for dysuria.   Musculoskeletal: Negative for joint swelling.   Neurological: Negative for headaches.   Psychiatric/Behavioral: Negative for confusion.       Physical Exam     Initial Vitals   BP Pulse Resp Temp SpO2   -- -- -- -- --      MAP       --         Physical Exam    Nursing note and vitals reviewed.  Constitutional: She is not diaphoretic. No distress.   HENT:   Head: Normocephalic and atraumatic.   Eyes: Conjunctivae are normal.   Neck: Normal range of motion.   Cardiovascular:   Tachycardic irregular rhythm   Pulmonary/Chest:   Moderate air movement with coarse breath sounds diffusely   Abdominal: Soft. There is no tenderness.   Musculoskeletal: Normal range of motion.   Chronic lower extremity edema with hyperpigmentation    Neurological: She is alert.   No gross deficits   Skin: No rash noted.   Multiple skin ulcers to both legs consistent with calciphylaxis   Psychiatric:   Alert anxious         ED Course   Critical Care  Date/Time: 5/9/2020 8:13 PM  Performed by: Sudheer Lennon MD  Authorized by: Sudheer Lennon MD   Direct patient critical care time: 15 minutes  Additional history critical care time: 5 minutes  Ordering / reviewing critical care time: 5 minutes  Documentation critical care time: 5 minutes  Consulting other physicians critical care time: 5 minutes  Total critical care time (exclusive of procedural time) : 35 minutes        Labs Reviewed - No data to display       Imaging Results    None          Medical Decision Making:   History:   Old Medical Records: I decided to obtain old medical records.  Clinical Tests:   Lab Tests: Reviewed  Radiological Study: Reviewed  Medical Tests: Reviewed  ED Management:  Patient presents with shortness of breath.  Patient does have respiratory distress with obvious pulmonary edema on chest x-ray.  BiPAP initiated.  Patient is not make urine.  She is end-stage renal disease.  Discussed with Dr. Ingram who will rate age for emergent hemodialysis.  Patient also with atrial fibrillation with rapid ventricular rate.  Likely AFib with RVR worsened pulmonary edema.  Patient now rate controlled on Cardizem drip.  Patient is sitting in bed at 45° laying back and much more comfortable.                                 Clinical Impression:       ICD-10-CM ICD-9-CM   1. Atrial fibrillation with RVR I48.91 427.31   2. Shortness of breath R06.02 786.05   3. Acute pulmonary edema J81.0 518.4                                Sudheer Lennon MD  05/09/20 2015

## 2020-05-10 LAB
ALBUMIN SERPL BCP-MCNC: 2.8 G/DL (ref 3.5–5.2)
ANION GAP SERPL CALC-SCNC: 10 MMOL/L (ref 8–16)
BASOPHILS # BLD AUTO: 0.02 K/UL (ref 0–0.2)
BASOPHILS NFR BLD: 0.3 % (ref 0–1.9)
BUN SERPL-MCNC: 22 MG/DL (ref 8–23)
CALCIUM SERPL-MCNC: 8.2 MG/DL (ref 8.7–10.5)
CHLORIDE SERPL-SCNC: 101 MMOL/L (ref 95–110)
CO2 SERPL-SCNC: 27 MMOL/L (ref 23–29)
CREAT SERPL-MCNC: 3.5 MG/DL (ref 0.5–1.4)
DIFFERENTIAL METHOD: ABNORMAL
EOSINOPHIL # BLD AUTO: 0 K/UL (ref 0–0.5)
EOSINOPHIL NFR BLD: 0.6 % (ref 0–8)
ERYTHROCYTE [DISTWIDTH] IN BLOOD BY AUTOMATED COUNT: 19.2 % (ref 11.5–14.5)
EST. GFR  (AFRICAN AMERICAN): 14.2 ML/MIN/1.73 M^2
EST. GFR  (NON AFRICAN AMERICAN): 12.3 ML/MIN/1.73 M^2
GLUCOSE SERPL-MCNC: 60 MG/DL (ref 70–110)
HCT VFR BLD AUTO: 25.2 % (ref 37–48.5)
HGB BLD-MCNC: 7.4 G/DL (ref 12–16)
IMM GRANULOCYTES # BLD AUTO: 0.04 K/UL (ref 0–0.04)
IMM GRANULOCYTES NFR BLD AUTO: 0.6 % (ref 0–0.5)
LYMPHOCYTES # BLD AUTO: 1.2 K/UL (ref 1–4.8)
LYMPHOCYTES NFR BLD: 17.5 % (ref 18–48)
MCH RBC QN AUTO: 27.8 PG (ref 27–31)
MCHC RBC AUTO-ENTMCNC: 29.4 G/DL (ref 32–36)
MCV RBC AUTO: 95 FL (ref 82–98)
MONOCYTES # BLD AUTO: 1 K/UL (ref 0.3–1)
MONOCYTES NFR BLD: 14.1 % (ref 4–15)
NEUTROPHILS # BLD AUTO: 4.6 K/UL (ref 1.8–7.7)
NEUTROPHILS NFR BLD: 66.9 % (ref 38–73)
NRBC BLD-RTO: 0 /100 WBC
PHOSPHATE SERPL-MCNC: 2.8 MG/DL (ref 2.7–4.5)
PLATELET # BLD AUTO: 205 K/UL (ref 150–350)
PMV BLD AUTO: 10.9 FL (ref 9.2–12.9)
POTASSIUM SERPL-SCNC: 5 MMOL/L (ref 3.5–5.1)
RBC # BLD AUTO: 2.66 M/UL (ref 4–5.4)
SODIUM SERPL-SCNC: 138 MMOL/L (ref 136–145)
WBC # BLD AUTO: 6.93 K/UL (ref 3.9–12.7)

## 2020-05-10 PROCEDURE — 94761 N-INVAS EAR/PLS OXIMETRY MLT: CPT

## 2020-05-10 PROCEDURE — 63600175 PHARM REV CODE 636 W HCPCS: Performed by: INTERNAL MEDICINE

## 2020-05-10 PROCEDURE — 94660 CPAP INITIATION&MGMT: CPT

## 2020-05-10 PROCEDURE — 25000003 PHARM REV CODE 250: Performed by: INTERNAL MEDICINE

## 2020-05-10 PROCEDURE — 27000221 HC OXYGEN, UP TO 24 HOURS

## 2020-05-10 PROCEDURE — 99900035 HC TECH TIME PER 15 MIN (STAT)

## 2020-05-10 PROCEDURE — 85025 COMPLETE CBC W/AUTO DIFF WBC: CPT

## 2020-05-10 PROCEDURE — 36415 COLL VENOUS BLD VENIPUNCTURE: CPT

## 2020-05-10 PROCEDURE — 80069 RENAL FUNCTION PANEL: CPT

## 2020-05-10 PROCEDURE — 21400001 HC TELEMETRY ROOM

## 2020-05-10 PROCEDURE — 20000000 HC ICU ROOM

## 2020-05-10 PROCEDURE — 87340 HEPATITIS B SURFACE AG IA: CPT

## 2020-05-10 RX ORDER — ACETAMINOPHEN 325 MG/1
650 TABLET ORAL EVERY 6 HOURS PRN
Status: DISCONTINUED | OUTPATIENT
Start: 2020-05-10 | End: 2020-05-13 | Stop reason: HOSPADM

## 2020-05-10 RX ORDER — LORAZEPAM 2 MG/ML
0.5 INJECTION INTRAMUSCULAR ONCE
Status: COMPLETED | OUTPATIENT
Start: 2020-05-10 | End: 2020-05-10

## 2020-05-10 RX ORDER — CHLORHEXIDINE GLUCONATE ORAL RINSE 1.2 MG/ML
15 SOLUTION DENTAL 2 TIMES DAILY
Status: DISCONTINUED | OUTPATIENT
Start: 2020-05-10 | End: 2020-05-13 | Stop reason: HOSPADM

## 2020-05-10 RX ORDER — HYDROMORPHONE HYDROCHLORIDE 1 MG/ML
0.5 INJECTION, SOLUTION INTRAMUSCULAR; INTRAVENOUS; SUBCUTANEOUS ONCE
Status: COMPLETED | OUTPATIENT
Start: 2020-05-10 | End: 2020-05-10

## 2020-05-10 RX ADMIN — ISOSORBIDE MONONITRATE 30 MG: 30 TABLET, EXTENDED RELEASE ORAL at 09:05

## 2020-05-10 RX ADMIN — METOPROLOL SUCCINATE 50 MG: 50 TABLET, FILM COATED, EXTENDED RELEASE ORAL at 09:05

## 2020-05-10 RX ADMIN — CALCIUM ACETATE 667 MG: 667 CAPSULE ORAL at 09:05

## 2020-05-10 RX ADMIN — DILTIAZEM HYDROCHLORIDE 10 MG/HR: 5 INJECTION INTRAVENOUS at 07:05

## 2020-05-10 RX ADMIN — MAGNESIUM OXIDE 400 MG: 400 TABLET ORAL at 09:05

## 2020-05-10 RX ADMIN — MORPHINE SULFATE 2 MG: 2 INJECTION, SOLUTION INTRAMUSCULAR; INTRAVENOUS at 12:05

## 2020-05-10 RX ADMIN — DILTIAZEM HYDROCHLORIDE 5 MG/HR: 5 INJECTION INTRAVENOUS at 05:05

## 2020-05-10 RX ADMIN — ACETAMINOPHEN 650 MG: 325 TABLET ORAL at 11:05

## 2020-05-10 RX ADMIN — LORAZEPAM 0.5 MG: 2 INJECTION INTRAMUSCULAR; INTRAVENOUS at 01:05

## 2020-05-10 RX ADMIN — HYDROMORPHONE HYDROCHLORIDE 0.5 MG: 1 INJECTION, SOLUTION INTRAMUSCULAR; INTRAVENOUS; SUBCUTANEOUS at 04:05

## 2020-05-10 RX ADMIN — ASPIRIN 81 MG: 81 TABLET, DELAYED RELEASE ORAL at 09:05

## 2020-05-10 NOTE — PLAN OF CARE
05/09/20 2246   Patient Assessment/Suction   Level of Consciousness (AVPU) alert   Respiratory Effort Short of breath   Expansion/Accessory Muscles/Retractions abdominal muscle use   PRE-TX-O2   O2 Device (Oxygen Therapy) BiPAP   Oxygen Concentration (%) 32   SpO2 99 %   Pulse Oximetry Type Continuous   $ Pulse Oximetry - Multiple Charge Pulse Oximetry - Multiple   Pulse (!) 119   Resp (!) 33   BP (!) 166/124   Preset CPAP/BiPAP Settings   Mode Of Delivery BiPAP S/T   $ Is patient using? Yes   Size of Mask Small   Sized Appropriately? Yes   Equipment Type V60   Airway Device Type small full face mask   Ipap 12   EPAP (cm H2O) 8   Pressure Support (cm H2O) 4   Set Rate (Breaths/Min) 18   ITime (sec) 0.8   Rise Time (sec) 3   Patient CPAP/BiPAP Settings   RR Total (Breaths/Min) 30   Tidal Volume (mL) 481   VE Minute Ventilation (L/min) 14.7 L/min   Peak Inspiratory Pressure (cm H2O) 12   TiTOT (%) 31   Total Leak (L/Min) 27   Patient Trigger - ST Mode Only (%) 99   CPAP/BiPAP Alarms   High Pressure (cm H2O) 40   Low Pressure (cm H2O) 10   Low Pressure Delay (Sec) 10   Minute Ventilation (L/Min) 3   High RR (breaths/min) 40   Low RR (breaths/min) 10   Apnea (Sec) 10   Respiratory Evaluation   $ Care Plan Tech Time 15 min   Evaluation For New Orders

## 2020-05-10 NOTE — PLAN OF CARE
This note also relates to the following rows which could not be included:  SpO2 - Cannot attach notes to unvalidated device data  Pulse - Cannot attach notes to unvalidated device data  Resp - Cannot attach notes to unvalidated device data       05/10/20 0754   Patient Assessment/Suction   All Lung Fields Breath Sounds crackles   PRE-TX-O2   O2 Device (Oxygen Therapy) nasal cannula   $ Is the patient on Low Flow Oxygen? Yes   Flow (L/min) 2   Pulse Oximetry Type Continuous   $ Pulse Oximetry - Multiple Charge Pulse Oximetry - Multiple   Preset CPAP/BiPAP Settings   $ CPAP/BiPAP Daily Charge BiPAP/CPAP Daily   Respiratory Evaluation   $ Care Plan Tech Time 15 min

## 2020-05-10 NOTE — CONSULTS
Consult Note  Nephrology    Griselda Neely  female  74 y.o.  Consult Requested By: David Gaytan MD  Reason for Consult: Acute Kidney Injury    SUBJECTIVE:     History of Present Illness:  Patient is a 74 y.o. female presents with worsening sob, afib with RVR placed on Cardizem drip. Well known to nephrology service as she get dialysis at [facility] in [facility]. She has poor compliance reporting unable to tolerating sitting in chair with calciphylaxis wound to complete full treatment time due to pain. Need pain management consult outpatient to assist. Confused and garbled speech, hollering in pain at times during interview, unclear if she attended dialysis 5/8 but received emergent HD overnight tolerated poorly with agitation and fidgeting 5/10 3L UF. Nephrology consulted for HD management in patient. Per dialysis center last hospitalization family may not be able to meet her  needs in home setting, patient denies this last hospitalization, recommend SW/Case management consult to evaluate home needs.       Review Of Systems:  PA accurately as hollering on/off  No answering questions but able to tell me her name    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    both legs    CHF (congestive heart failure)     Encounter for blood transfusion 03/2016    Gout     Hypertension     Mitral valve regurgitation     Osteoarthritis     Pancreatitis     Peripheral vascular disease     Peritoneal dialysis catheter in place     Pneumonia 09/09/2017    Renal failure      Past Surgical History:   Procedure Laterality Date    ACHILLES TENDON SURGERY Right     ANGIOGRAPHY OF ARTERIOVENOUS SHUNT Right 1/7/2020    Procedure: Fistulogram with Possible Intervention;  Surgeon: Joseph Loyola MD;  Location: [facility] CATH/EP LAB;  Service: Cardiology;  Laterality: Right;    ANGIOGRAPHY OF LOWER EXTREMITY Left 3/18/2020    Procedure: Angiogram Extremity Unilateral;  Surgeon: Ali Khoobehi, MD;   Location: [facility] CATH[facility]EP LAB;  Service: Cardiology;  Laterality: Left;    APPENDECTOMY      CARDIAC CATHETERIZATION  07/03/2017    CHOLECYSTECTOMY      heal surgery Right     HYSTERECTOMY      INSERTION OF STENT INTO PERIPHERAL VESSEL N/A 1/7/2020    Procedure: INSERTION, STENT, VESSEL, PERIPHERAL;  Surgeon: Joseph Loyola MD;  Location: [facility] CATH/EP LAB;  Service: Cardiology;  Laterality: N/A;    PARATHYROIDECTOMY      PARATHYROIDECTOMY  07/13/2017    PERCUTANEOUS TRANSLUMINAL ANGIOPLASTY OF ARTERIOVENOUS FISTULA N/A 1/7/2020    Procedure: PTA, AV FISTULA;  Surgeon: Joseph Loyola MD;  Location: [facility] CATH/EP LAB;  Service: Cardiology;  Laterality: N/A;    PERITONEAL CATHETER INSERTION      RENAL BIOPSY      vocal cord nodule       Family History   Problem Relation Age of Onset    Heart disease Sister     Early death Sister         heart as baby    Heart disease Maternal Grandfather     Diabetes Mother     Hypertension Mother     Heart failure Mother     Heart disease Mother     Arthritis Mother     Diabetes Father     Early death Sister         infant     Social History     Tobacco Use    Smoking status: Current Some Day Smoker     Packs/day: 0.50     Years: 45.00     Pack years: 22.50     Types: Cigarettes    Smokeless tobacco: Never Used   Substance Use Topics    Alcohol use: No    Drug use: No      Review of patient's allergies indicates:  No Known Allergies   Prior to Admission medications    Medication Sig Start Date End Date Taking? Authorizing Provider   aspirin (ECOTRIN) 81 MG EC tablet Take 81 mg by mouth once daily.   Yes Historical Provider, MD   calcium acetate (PHOSLO) 667 mg capsule Take 667 mg by mouth once daily.    Yes Historical Provider, MD   diltiaZEM (CARDIZEM CD) 180 MG 24 hr capsule Take 180 mg by mouth once daily.   Yes Historical Provider, MD   isosorbide mononitrate (IMDUR) 30 MG 24 hr tablet Take 30 mg by mouth once daily.   Yes Historical Provider, MD    losartan (COZAAR) 25 MG tablet Take 25 mg by mouth once daily.   Yes Historical Provider, MD   magnesium oxide (MAG-OX) 400 mg (241.3 mg magnesium) tablet Take 400 mg by mouth once daily.   Yes Historical Provider, MD   metoprolol succinate (TOPROL-XL) 50 MG 24 hr tablet Take 1 tablet (50 mg total) by mouth once daily.  Patient taking differently: Take 50 mg by mouth once daily.  8/10/19 8/9/20 Yes Daren Lea MD   mupirocin (BACTROBAN) 2 % ointment Apply topically 3 (three) times daily.  Patient taking differently: Apply 1 g topically 3 (three) times daily.  2/19/20  Yes Kalie Neely MD   oxyCODONE-acetaminophen (PERCOCET)  mg per tablet Take 1 tablet by mouth every 4 (four) hours as needed for Pain.   Yes Historical Provider, MD   warfarin (COUMADIN) 1 MG tablet Take 2 tablets (2 mg total) by mouth Daily. 3/19/20 5/9/20 Yes Lissa Portillo MD           dilTIAZem 5 mg/hr (05/09/20 2142)    dilTIAZem 10 mg/hr (05/10/20 0400)       sodium chloride 0.9%   Intravenous Once    aspirin  81 mg Oral Daily    calcium acetate(phosphat bind)  667 mg Oral Daily    isosorbide mononitrate  30 mg Oral Daily    magnesium oxide  400 mg Oral Daily    metoprolol succinate  50 mg Oral Daily    mupirocin  1 g Topical (Top) TID     sodium chloride 0.9%, dextrose 50%, dextrose 50%, glucagon (human recombinant), glucose, glucose, morphine, oxyCODONE-acetaminophen, sodium chloride 0.9%       OBJECTIVE:     Vital Signs (Most Recent)  Temp: 97.9 °F (36.6 °C) (05/10/20 0245)  Pulse: 79 (05/10/20 0630)  Resp: (!) 21 (05/10/20 0630)  BP: 137/72 (05/10/20 0630)  SpO2: (!) 93 % (05/10/20 0630)    Vital Signs Range (Last 24H):  Temp:  [97.9 °F (36.6 °C)-98.8 °F (37.1 °C)]   Pulse:  []   Resp:  [18-40]   BP: (122-209)/()   SpO2:  [87 %-100 %]     Physical Exam:  General: Awake and alert. Hollering intermittent in pain  HEENT: No scleral icterus, MM moist   NECK:  JVD.  CV:  RRR with  murmurs  PULM:  Crackles throughout lung fields; No rhonchi, wheezes. O2 per NC  ABD:  Soft, nl BS, NT, ND.  EXTR:  No edema, clubbing, cyanosis.   NEURO: Grossly Intact.  SKIN:  Wd thigh with dressing untouched  MS:  No joint effusions.   PSYCH: Normal Mood.  Access without s/s infection    Laboratory:  Recent Labs   Lab 05/09/20  1925 05/10/20  0709    138   K 5.3* 5.0    101   CO2 27 27   BUN 36* 22   CREATININE 4.6* 3.5*   * 60*   CALCIUM 8.2* 8.2*   PHOS  --  2.8   CPK 34  --        Recent Labs   Lab 05/09/20  1919 05/09/20  1925 05/10/20  0709   WBC  --  6.79 6.93   HGB  --  7.9* 7.4*   HCT 24* 26.5* 25.2*   PLT  --  268 205           ASSESSMENT/PLAN:     1. ESRD -poor compliance, HD overnight with 3L UF, plan again in am; needs sodium thiosulfate 12.5 gm with each treatment due to calciphylaxis; renal diet; Renal dose all meds appropriate GFR, anuric  2. Anemia- below goal 10-12, epo with HD, check iron studies, transfuse as needed  3. 2nd HPT- calcium corrects for low albumin, Phos 2.8, continue binders with meals and monitor  4. Calciphylaxis 2nd uncontrolled Phos and poor compliance in past-sodium thiosulfate 12.5gm with HD treatments  5. HTN- continue home meds, UF with HD, clonidine systolic >160; goal systolic <130>90  6. Afib RVR/CHF- UF with HD for volume control, defer otherwise to cardiology     Recommend case management/SW consult  Needs pain management to prevent recurrent hospitalizations for calciphylaxis wd pain     RL Chung/Joseph Ingram M.D.

## 2020-05-10 NOTE — H&P
[facility]  History & Physical    Patient Name: Griselda Neely  MRN: [id number]  Admission Date: 5/9/2020  Attending Physician: Sudheer Lennon MD   Primary Care Provider: Kalie Neely MD         Patient information was obtained from patient and ER records.     Subjective:     Principal Problem:ESRD (end stage renal disease)    Chief Complaint:   Chief Complaint   Patient presents with    Shortness of Breath     Onset today        HPI:  74 y.o.F with  past medical history of Anemia, Calciphylaxis (07/2017), CHF (congestive heart failure), , Gout, Hypertension, Mitral valve regurgitation, Osteoarthritis, Pancreatitis, Peripheral vascular disease, Peritoneal dialysis catheter in place, Pneumonia (09/09/2017), and Renal failure.. Presented with SOB.  Patient received dialysis yesterday after she was recently  discharged from hospital.  They are  planning to go to [facility] rehab but still awaited and currently she is at home.    Patient was found to have AFib with RVR around 140s in the emergency room and started Cardizem drip and currently the rate is better controlled to less than 100.  Chest x-ray with worsening fluid accumulation and cardiac nephrology was consulted for the dialysis overnight   Patient denies any chest pain.  No fever or chills.  No pleurisy hemoptysis.  No relieving factors at home.   Patient was placed on BiPAP  In ER .she can  give only minimal information and most information and ER physician. No family member bedside.           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    both legs    CHF (congestive heart failure)     Encounter for blood transfusion 03/2016    Gout     Hypertension     Mitral valve regurgitation     Osteoarthritis     Pancreatitis     Peripheral vascular disease     Peritoneal dialysis catheter in place     Pneumonia 09/09/2017    Renal failure        Past Surgical History:   Procedure  Laterality Date    ACHILLES TENDON SURGERY Right     ANGIOGRAPHY OF ARTERIOVENOUS SHUNT Right 1/7/2020    Procedure: Fistulogram with Possible Intervention;  Surgeon: Joseph Loyola MD;  Location: [facility] CATH/EP LAB;  Service: Cardiology;  Laterality: Right;    ANGIOGRAPHY OF LOWER EXTREMITY Left 3/18/2020    Procedure: Angiogram Extremity Unilateral;  Surgeon: Ali Khoobehi, MD;  Location: [facility] CATH/EP LAB;  Service: Cardiology;  Laterality: Left;    APPENDECTOMY      CARDIAC CATHETERIZATION  07/03/2017    CHOLECYSTECTOMY      heal surgery Right     HYSTERECTOMY      INSERTION OF STENT INTO PERIPHERAL VESSEL N/A 1/7/2020    Procedure: INSERTION, STENT, VESSEL, PERIPHERAL;  Surgeon: Joseph Loyola MD;  Location: [facility] CATH/EP LAB;  Service: Cardiology;  Laterality: N/A;    PARATHYROIDECTOMY      PARATHYROIDECTOMY  07/13/2017    PERCUTANEOUS TRANSLUMINAL ANGIOPLASTY OF ARTERIOVENOUS FISTULA N/A 1/7/2020    Procedure: PTA, AV FISTULA;  Surgeon: Joseph Loyola MD;  Location: [facility] CATH/EP LAB;  Service: Cardiology;  Laterality: N/A;    PERITONEAL CATHETER INSERTION      RENAL BIOPSY      vocal cord nodule         Review of patient's allergies indicates:  No Known Allergies    No current facility-administered medications on file prior to encounter.      Current Outpatient Medications on File Prior to Encounter   Medication Sig    aspirin (ECOTRIN) 81 MG EC tablet Take 81 mg by mouth once daily.    calcium acetate (PHOSLO) 667 mg capsule Take 667 mg by mouth once daily.     diltiaZEM (CARDIZEM CD) 180 MG 24 hr capsule Take 180 mg by mouth once daily.    isosorbide mononitrate (IMDUR) 30 MG 24 hr tablet Take 30 mg by mouth once daily.    losartan (COZAAR) 25 MG tablet Take 25 mg by mouth once daily.    magnesium oxide (MAG-OX) 400 mg (241.3 mg magnesium) tablet Take 400 mg by mouth once daily.    metoprolol succinate (TOPROL-XL) 50 MG 24 hr tablet Take 1 tablet (50 mg total) by mouth once daily.  (Patient taking differently: Take 50 mg by mouth once daily. )    mupirocin (BACTROBAN) 2 % ointment Apply topically 3 (three) times daily. (Patient taking differently: Apply 1 g topically 3 (three) times daily. )    oxyCODONE-acetaminophen (PERCOCET)  mg per tablet Take 1 tablet by mouth every 4 (four) hours as needed for Pain.    warfarin (COUMADIN) 1 MG tablet Take 2 tablets (2 mg total) by mouth Daily.    [DISCONTINUED] ondansetron (ZOFRAN-ODT) 4 MG TbDL Take 1 tablet (4 mg total) by mouth every 6 (six) hours as needed.     Family History     Problem Relation (Age of Onset)    Arthritis Mother    Diabetes Mother, Father    Early death Sister, Sister    Heart disease Sister, Maternal Grandfather, Mother    Heart failure Mother    Hypertension Mother        Tobacco Use    Smoking status: Current Some Day Smoker     Packs/day: 0.50     Years: 45.00     Pack years: 22.50     Types: Cigarettes    Smokeless tobacco: Never Used   Substance and Sexual Activity    Alcohol use: No    Drug use: No    Sexual activity: Not on file     Review of Systems  Objective:     Vital Signs (Most Recent):  Temp: 98.8 °F (37.1 °C) (05/09/20 1830)  Pulse: 100 (05/09/20 2050)  Resp: 19 (05/09/20 2050)  BP: 139/63 (05/09/20 2050)  SpO2: 100 % (05/09/20 2050) Vital Signs (24h Range):  Temp:  [98.8 °F (37.1 °C)] 98.8 °F (37.1 °C)  Pulse:  [100-149] 100  Resp:  [18-32] 19  SpO2:  [93 %-100 %] 100 %  BP: (126-209)/() 139/63     Weight: 65.8 kg (145 lb)  Body mass index is 24.13 kg/m².    Physical Exam        Significant Labs:   BMP:   Recent Labs   Lab 05/09/20 1925   *      K 5.3*      CO2 27   BUN 36*   CREATININE 4.6*   CALCIUM 8.2*   MG 1.9     CBC:   Recent Labs   Lab 05/09/20 1919 05/09/20 1925   WBC  --  6.79   HGB  --  7.9*   HCT 24* 26.5*   PLT  --  268       Significant Imaging: I have reviewed all pertinent imaging results/findings within the past 24 hours.    Assessment/Plan:     * ESRD  (end stage renal disease) on HD M,W, F  Fluid overload    Plan    For HD tonight .  Patient appeared to be a hospice candidate      Atrial fibrillation with RVR    Plan   Continue Cardizem drip and wean of  Home beta-blocker        H/O mitral valve replacement  aware      Chronic systolic heart failure    aware    PVD (peripheral vascular disease)  aware      Chronic respiratory failure  Currently on BIPAP secondary to fluid overload      Pulmonary hypertension  Stable and home meds      Anemia due to chronic kidney disease    Slightly lower than baseline, patient got blood transfusion the last admission    Plan   Transfusion p.r.n.    Other persistent atrial fibrillation  Currently AFib with RVR    Plan   Continue Cardizem drip and wean of  Home beta-blocker      HTN (hypertension)  Stable and home meds      VTE Risk Mitigation (From admission, onward)         Ordered     IP VTE HIGH RISK PATIENT  Once      05/09/20 2100     Place sequential compression device  Until discontinued      05/09/20 2100                   David Gaytan MD  Department of Hospital Medicine   [facility]

## 2020-05-10 NOTE — SUBJECTIVE & OBJECTIVE
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    both legs    CHF (congestive heart failure)     Encounter for blood transfusion 03/2016    Gout     Hypertension     Mitral valve regurgitation     Osteoarthritis     Pancreatitis     Peripheral vascular disease     Peritoneal dialysis catheter in place     Pneumonia 09/09/2017    Renal failure        Past Surgical History:   Procedure Laterality Date    ACHILLES TENDON SURGERY Right     ANGIOGRAPHY OF ARTERIOVENOUS SHUNT Right 1/7/2020    Procedure: Fistulogram with Possible Intervention;  Surgeon: Joseph Loyola MD;  Location: [facility] CATH/EP LAB;  Service: Cardiology;  Laterality: Right;    ANGIOGRAPHY OF LOWER EXTREMITY Left 3/18/2020    Procedure: Angiogram Extremity Unilateral;  Surgeon: Ali Khoobehi, MD;  Location: [facility] CATH/EP LAB;  Service: Cardiology;  Laterality: Left;    APPENDECTOMY      CARDIAC CATHETERIZATION  07/03/2017    CHOLECYSTECTOMY      heal surgery Right     HYSTERECTOMY      INSERTION OF STENT INTO PERIPHERAL VESSEL N/A 1/7/2020    Procedure: INSERTION, STENT, VESSEL, PERIPHERAL;  Surgeon: Joseph Loyola MD;  Location: [facility] CATH/EP LAB;  Service: Cardiology;  Laterality: N/A;    PARATHYROIDECTOMY      PARATHYROIDECTOMY  07/13/2017    PERCUTANEOUS TRANSLUMINAL ANGIOPLASTY OF ARTERIOVENOUS FISTULA N/A 1/7/2020    Procedure: PTA, AV FISTULA;  Surgeon: Joseph Loyola MD;  Location: [facility] CATH/EP LAB;  Service: Cardiology;  Laterality: N/A;    PERITONEAL CATHETER INSERTION      RENAL BIOPSY      vocal cord nodule         Review of patient's allergies indicates:  No Known Allergies    No current facility-administered medications on file prior to encounter.      Current Outpatient Medications on File Prior to Encounter   Medication Sig    aspirin (ECOTRIN) 81 MG EC tablet Take 81 mg by mouth once daily.    calcium acetate (PHOSLO) 667 mg capsule Take 667 mg by mouth once daily.      diltiaZEM (CARDIZEM CD) 180 MG 24 hr capsule Take 180 mg by mouth once daily.    isosorbide mononitrate (IMDUR) 30 MG 24 hr tablet Take 30 mg by mouth once daily.    losartan (COZAAR) 25 MG tablet Take 25 mg by mouth once daily.    magnesium oxide (MAG-OX) 400 mg (241.3 mg magnesium) tablet Take 400 mg by mouth once daily.    metoprolol succinate (TOPROL-XL) 50 MG 24 hr tablet Take 1 tablet (50 mg total) by mouth once daily. (Patient taking differently: Take 50 mg by mouth once daily. )    mupirocin (BACTROBAN) 2 % ointment Apply topically 3 (three) times daily. (Patient taking differently: Apply 1 g topically 3 (three) times daily. )    oxyCODONE-acetaminophen (PERCOCET)  mg per tablet Take 1 tablet by mouth every 4 (four) hours as needed for Pain.    warfarin (COUMADIN) 1 MG tablet Take 2 tablets (2 mg total) by mouth Daily.    [DISCONTINUED] ondansetron (ZOFRAN-ODT) 4 MG TbDL Take 1 tablet (4 mg total) by mouth every 6 (six) hours as needed.     Family History     Problem Relation (Age of Onset)    Arthritis Mother    Diabetes Mother, Father    Early death Sister, Sister    Heart disease Sister, Maternal Grandfather, Mother    Heart failure Mother    Hypertension Mother        Tobacco Use    Smoking status: Current Some Day Smoker     Packs/day: 0.50     Years: 45.00     Pack years: 22.50     Types: Cigarettes    Smokeless tobacco: Never Used   Substance and Sexual Activity    Alcohol use: No    Drug use: No    Sexual activity: Not on file     Review of Systems  Objective:     Vital Signs (Most Recent):  Temp: 98.8 °F (37.1 °C) (05/09/20 1830)  Pulse: 100 (05/09/20 2050)  Resp: 19 (05/09/20 2050)  BP: 139/63 (05/09/20 2050)  SpO2: 100 % (05/09/20 2050) Vital Signs (24h Range):  Temp:  [98.8 °F (37.1 °C)] 98.8 °F (37.1 °C)  Pulse:  [100-149] 100  Resp:  [18-32] 19  SpO2:  [93 %-100 %] 100 %  BP: (126-209)/() 139/63     Weight: 65.8 kg (145 lb)  Body mass index is 24.13 kg/m².    Physical  Exam        Significant Labs:   BMP:   Recent Labs   Lab 05/09/20 1925   *      K 5.3*      CO2 27   BUN 36*   CREATININE 4.6*   CALCIUM 8.2*   MG 1.9     CBC:   Recent Labs   Lab 05/09/20 1919 05/09/20 1925   WBC  --  6.79   HGB  --  7.9*   HCT 24* 26.5*   PLT  --  268       Significant Imaging: I have reviewed all pertinent imaging results/findings within the past 24 hours.

## 2020-05-10 NOTE — PROGRESS NOTES
"[facility] Medicine Progress Note  Patient Name: Griselda Neely MRN: [id number]   Patient Class: IP- Inpatient  Length of Stay: 1   Admission Date: 5/9/2020  6:19 PM Attending Physician: Hola Rubio MD   Primary Care Provider: Kalie Neely MD Face-to-Face encounter date: 05/10/2020   Chief Complaint: Shortness of Breath (Onset today)    Assessment & Plan:   Griselda Neely is a 74 y.o. female admitted for    1. ESRD HDD Non compliance   2. Calciphylaxis  3. Chronic Opoid dependence with side effects  4. Acute on chronic CHF  5.    Plan  Very non compliant  Recurrent admissions for the same  Dependence on opoids makes it difficult. She is always confused at home and not able to participate in exercise.  Discussed with the family about long term care including the son who is power of . Discussed with him about the quality of her life and what would she want about dialysis. I suggested continuing current medical care vs considering palliative options. He agreed to consider hospice but he will discuss with the rest of the family. I think overall her prognosis is grim and that focusing on maintain her comfortable should be our focus.     Discussed her case with sisters and son     Discharge Planning:   Family considering hospice. Consulted  for that.     Subjective:    Interval History: Patient is very confused. Wanting pain medications. Discussed her care with the family as above.    Review of Systems All other Review of Systems were found to be negative expect for that mentioned already in HPI.   Objective:   Physical Exam  BP 99/67   Pulse 108   Temp 98.3 °F (36.8 °C)   Resp 16   Ht 5' 5" (1.651 m)   Wt 65 kg (143 lb 4.8 oz)   LMP  (LMP Unknown)   SpO2 99%   Breastfeeding? No   BMI 23.85 kg/m²   Vitals reviewed.    Constitutional: very confused  HENT: Atraumatic.   Cardiovascular: Normal rate, regular rhythm and normal heart sounds. "   Pulmonary/Chest: Coarse breath sounds bilaterally.   Abdominal: Soft. Bowel sounds are normal. Exhibits no distension and no mass. No tenderness  Neurological: very confused.   Skin: Skin is warm and dry.     Following labs were Reviewed   Recent Labs   Lab 05/09/20  1925 05/10/20  0709   WBC 6.79 6.93   HGB 7.9* 7.4*   HCT 26.5* 25.2*    205   CALCIUM 8.2* 8.2*   ALBUMIN 3.0* 2.8*   PROT 8.1  --     138   K 5.3* 5.0   CO2 27 27    101   BUN 36* 22   CREATININE 4.6* 3.5*   ALKPHOS 117  --    ALT 10  --    AST 17  --    BILITOT 0.6  --      No results found for: POCTGLUCOSE     All labs within the past 24 hours have been reviewed  Microbiology Results (last 7 days)     ** No results found for the last 168 hours. **        X-Ray Chest AP Portable   Final Result          Inpatient medications  Scheduled Meds:   sodium chloride 0.9%   Intravenous Once    aspirin  81 mg Oral Daily    calcium acetate(phosphat bind)  667 mg Oral Daily    chlorhexidine  15 mL Mouth/Throat BID    isosorbide mononitrate  30 mg Oral Daily    magnesium oxide  400 mg Oral Daily    metoprolol succinate  50 mg Oral Daily    mupirocin  1 g Topical (Top) TID     Continuous Infusions:   dilTIAZem 5 mg/hr (05/10/20 0518)    dilTIAZem 10 mg/hr (05/10/20 1001)     PRN Meds:.sodium chloride 0.9%, dextrose 50%, dextrose 50%, glucagon (human recombinant), glucose, glucose, morphine, oxyCODONE-acetaminophen, sodium chloride 0.9%    Above encounter included review of the medical records, interviewing and examining the patient face-to-face, discussion with family and other health care providers, ordering and interpreting lab/test results and formulating a plan of care.     Medical Decision Making:    [] Low Complexity  [x] Moderate Complexity  [] High Complexity    Hola Rubio  [facility] Hospitalist  05/10/2020

## 2020-05-10 NOTE — HPI
74 y.o.F with  past medical history of Anemia, Calciphylaxis (07/2017), CHF (congestive heart failure), , Gout, Hypertension, Mitral valve regurgitation, Osteoarthritis, Pancreatitis, Peripheral vascular disease, Peritoneal dialysis catheter in place, Pneumonia (09/09/2017), and Renal failure.. Presented with SOB.  Patient received dialysis yesterday after she was recently  discharged from hospital.  They are  planning to go to [facility] rehab but still awaited and currently she is at home.    Patient was found to have AFib with RVR around 140s in the emergency room and started Cardizem drip and currently the rate is better controlled to less than 100.  Chest x-ray with worsening fluid accumulation and cardiac nephrology was consulted for the dialysis overnight   Patient denies any chest pain.  No fever or chills.  No pleurisy hemoptysis.  No relieving factors at home.   Patient was placed on BiPAP  In ER .she can  give only minimal information and most information and ER physician. No family member bedside.

## 2020-05-10 NOTE — NURSING
HD tx complete, did not tolerate well  Pt remained restless, agitated, and fidgety throughout entire tx, difficulty getting her to remain still, required assistance from RN pulling needles post-HD  Net UF 3L

## 2020-05-10 NOTE — PLAN OF CARE
05/10/20 1449   Discharge Reassessment   Assessment Type Discharge Planning Assessment   Anticipated Discharge Disposition HospiceHome   Discharge Plan A Hospice/home   Discharge Plan B Hospice/home   DME Needed Upon Discharge  none   Post-Acute Status   Post-Acute Authorization Hospice     Spoke with patient and family about Hospice. Patient and family do not wish to entertain the option of Hospice at this time. Family wishes to take a few days to think about other options.

## 2020-05-10 NOTE — ASSESSMENT & PLAN NOTE
Slightly lower than baseline, patient got blood transfusion the last admission    Plan   Transfusion p.r.n.

## 2020-05-11 LAB
ALBUMIN SERPL BCP-MCNC: 2.7 G/DL (ref 3.5–5.2)
ALP SERPL-CCNC: 112 U/L (ref 55–135)
ALT SERPL W/O P-5'-P-CCNC: 11 U/L (ref 10–44)
ANION GAP SERPL CALC-SCNC: 9 MMOL/L (ref 8–16)
AST SERPL-CCNC: 22 U/L (ref 10–40)
BASOPHILS # BLD AUTO: 0.02 K/UL (ref 0–0.2)
BASOPHILS NFR BLD: 0.3 % (ref 0–1.9)
BILIRUB SERPL-MCNC: 1.3 MG/DL (ref 0.1–1)
BUN SERPL-MCNC: 37 MG/DL (ref 8–23)
CALCIUM SERPL-MCNC: 8 MG/DL (ref 8.7–10.5)
CHLORIDE SERPL-SCNC: 99 MMOL/L (ref 95–110)
CO2 SERPL-SCNC: 25 MMOL/L (ref 23–29)
CREAT SERPL-MCNC: 4.8 MG/DL (ref 0.5–1.4)
DIFFERENTIAL METHOD: ABNORMAL
EOSINOPHIL # BLD AUTO: 0 K/UL (ref 0–0.5)
EOSINOPHIL NFR BLD: 0.5 % (ref 0–8)
ERYTHROCYTE [DISTWIDTH] IN BLOOD BY AUTOMATED COUNT: 19.1 % (ref 11.5–14.5)
EST. GFR  (AFRICAN AMERICAN): 9.7 ML/MIN/1.73 M^2
EST. GFR  (NON AFRICAN AMERICAN): 8.4 ML/MIN/1.73 M^2
GLUCOSE SERPL-MCNC: 70 MG/DL (ref 70–110)
HCT VFR BLD AUTO: 23.5 % (ref 37–48.5)
HGB BLD-MCNC: 7.2 G/DL (ref 12–16)
IMM GRANULOCYTES # BLD AUTO: 0.03 K/UL (ref 0–0.04)
IMM GRANULOCYTES NFR BLD AUTO: 0.5 % (ref 0–0.5)
LYMPHOCYTES # BLD AUTO: 0.7 K/UL (ref 1–4.8)
LYMPHOCYTES NFR BLD: 10.8 % (ref 18–48)
MAGNESIUM SERPL-MCNC: 2.1 MG/DL (ref 1.6–2.6)
MCH RBC QN AUTO: 27.8 PG (ref 27–31)
MCHC RBC AUTO-ENTMCNC: 30.6 G/DL (ref 32–36)
MCV RBC AUTO: 91 FL (ref 82–98)
MONOCYTES # BLD AUTO: 1 K/UL (ref 0.3–1)
MONOCYTES NFR BLD: 15.1 % (ref 4–15)
NEUTROPHILS # BLD AUTO: 4.6 K/UL (ref 1.8–7.7)
NEUTROPHILS NFR BLD: 72.8 % (ref 38–73)
NRBC BLD-RTO: 0 /100 WBC
PHOSPHATE SERPL-MCNC: 4.6 MG/DL (ref 2.7–4.5)
PLATELET # BLD AUTO: 218 K/UL (ref 150–350)
PMV BLD AUTO: 10.7 FL (ref 9.2–12.9)
POTASSIUM SERPL-SCNC: 5.3 MMOL/L (ref 3.5–5.1)
PROT SERPL-MCNC: 7.3 G/DL (ref 6–8.4)
RBC # BLD AUTO: 2.59 M/UL (ref 4–5.4)
SODIUM SERPL-SCNC: 133 MMOL/L (ref 136–145)
WBC # BLD AUTO: 6.28 K/UL (ref 3.9–12.7)

## 2020-05-11 PROCEDURE — 90935 HEMODIALYSIS ONE EVALUATION: CPT

## 2020-05-11 PROCEDURE — 25000003 PHARM REV CODE 250: Performed by: INTERNAL MEDICINE

## 2020-05-11 PROCEDURE — 99900035 HC TECH TIME PER 15 MIN (STAT)

## 2020-05-11 PROCEDURE — 12000002 HC ACUTE/MED SURGE SEMI-PRIVATE ROOM

## 2020-05-11 PROCEDURE — 85025 COMPLETE CBC W/AUTO DIFF WBC: CPT

## 2020-05-11 PROCEDURE — 94761 N-INVAS EAR/PLS OXIMETRY MLT: CPT

## 2020-05-11 PROCEDURE — 80053 COMPREHEN METABOLIC PANEL: CPT

## 2020-05-11 PROCEDURE — 94660 CPAP INITIATION&MGMT: CPT

## 2020-05-11 PROCEDURE — 27000221 HC OXYGEN, UP TO 24 HOURS

## 2020-05-11 PROCEDURE — 25000003 PHARM REV CODE 250

## 2020-05-11 PROCEDURE — 36415 COLL VENOUS BLD VENIPUNCTURE: CPT

## 2020-05-11 PROCEDURE — 83735 ASSAY OF MAGNESIUM: CPT

## 2020-05-11 PROCEDURE — 84100 ASSAY OF PHOSPHORUS: CPT

## 2020-05-11 RX ORDER — SODIUM THIOSULFATE 250 MG/ML
12.5 INJECTION, SOLUTION INTRAVENOUS
Status: DISCONTINUED | OUTPATIENT
Start: 2020-05-11 | End: 2020-05-13 | Stop reason: HOSPADM

## 2020-05-11 RX ADMIN — CHLORHEXIDINE GLUCONATE 15 ML: 1.2 RINSE ORAL at 08:05

## 2020-05-11 RX ADMIN — METOPROLOL SUCCINATE 50 MG: 50 TABLET, FILM COATED, EXTENDED RELEASE ORAL at 08:05

## 2020-05-11 RX ADMIN — ISOSORBIDE MONONITRATE 30 MG: 30 TABLET, EXTENDED RELEASE ORAL at 08:05

## 2020-05-11 RX ADMIN — CALCIUM ACETATE 667 MG: 667 CAPSULE ORAL at 08:05

## 2020-05-11 RX ADMIN — SODIUM THIOSULFATE 12.5 G: 250 INJECTION, SOLUTION INTRAVENOUS at 12:05

## 2020-05-11 RX ADMIN — OXYCODONE HYDROCHLORIDE AND ACETAMINOPHEN 1 TABLET: 10; 325 TABLET ORAL at 11:05

## 2020-05-11 RX ADMIN — MUPIROCIN 1 G: 20 OINTMENT TOPICAL at 08:05

## 2020-05-11 RX ADMIN — DILTIAZEM HYDROCHLORIDE 12.5 MG/HR: 5 INJECTION INTRAVENOUS at 05:05

## 2020-05-11 RX ADMIN — MAGNESIUM OXIDE 400 MG: 400 TABLET ORAL at 08:05

## 2020-05-11 RX ADMIN — ASPIRIN 81 MG: 81 TABLET, DELAYED RELEASE ORAL at 08:05

## 2020-05-11 NOTE — PROGRESS NOTES
[facility]  Nephrology  Consult Note    Patient Name: Griselda Neely  MRN: [id number]  Admission Date: 5/9/2020  Hospital Length of Stay: 2 days  Attending Provider: Hola Rubio MD   Primary Care Physician: Kalie Neely MD  Principal Problem:ESRD (end stage renal disease)    Consults  Subjective:     HPI:   73 y/o AAF with a history of ESRD (outpatient MWF iHD at [facility]") complicated by poor compliance, Calciphylaxis, CHF, Atrial Fibrillation, HTN, PVD who was admitted with Afib with RVR, CHF/ Hypervolemia, Acute hypoxic respiratory failure - requiring emergent iHD.on 5/10.   Patient was seen just after completing iHD this AM (patient tolerated -3L UF). Stable run with no complications or issues. Patient denies any current CP, fever/ chills. Still requiring supplemental O2, but describes improvement of dyspnea. VSS       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    both legs    CHF (congestive heart failure)     Encounter for blood transfusion 03/2016    Gout     Hypertension     Mitral valve regurgitation     Osteoarthritis     Pancreatitis     Peripheral vascular disease     Peritoneal dialysis catheter in place     Pneumonia 09/09/2017    Renal failure        Past Surgical History:   Procedure Laterality Date    ACHILLES TENDON SURGERY Right     ANGIOGRAPHY OF ARTERIOVENOUS SHUNT Right 1/7/2020    Procedure: Fistulogram with Possible Intervention;  Surgeon: Joseph Loyola MD;  Location: [facility] CATH/EP LAB;  Service: Cardiology;  Laterality: Right;    ANGIOGRAPHY OF LOWER EXTREMITY Left 3/18/2020    Procedure: Angiogram Extremity Unilateral;  Surgeon: Ali Khoobehi, MD;  Location: [facility] CATH/EP LAB;  Service: Cardiology;  Laterality: Left;    APPENDECTOMY      CARDIAC CATHETERIZATION  07/03/2017    CHOLECYSTECTOMY      heal surgery Right     HYSTERECTOMY      INSERTION OF STENT INTO PERIPHERAL VESSEL N/A 1/7/2020     Procedure: INSERTION, STENT, VESSEL, PERIPHERAL;  Surgeon: Joseph Loyola MD;  Location: [facility] CATH/EP LAB;  Service: Cardiology;  Laterality: N/A;    PARATHYROIDECTOMY      PARATHYROIDECTOMY  07/13/2017    PERCUTANEOUS TRANSLUMINAL ANGIOPLASTY OF ARTERIOVENOUS FISTULA N/A 1/7/2020    Procedure: PTA, AV FISTULA;  Surgeon: Joseph Loyola MD;  Location: [facility] CATH/EP LAB;  Service: Cardiology;  Laterality: N/A;    PERITONEAL CATHETER INSERTION      RENAL BIOPSY      vocal cord nodule         Review of patient's allergies indicates:  No Known Allergies  Current Facility-Administered Medications   Medication Frequency    0.9%  NaCl infusion PRN    0.9%  NaCl infusion Once    acetaminophen tablet 650 mg Q6H PRN    aspirin EC tablet 81 mg Daily    calcium acetate(phosphat bind) capsule 667 mg Daily    chlorhexidine 0.12 % solution 15 mL BID    dextrose 50% injection 12.5 g PRN    dextrose 50% injection 25 g PRN    glucagon (human recombinant) injection 1 mg PRN    glucose chewable tablet 16 g PRN    glucose chewable tablet 24 g PRN    isosorbide mononitrate 24 hr tablet 30 mg Daily    magnesium oxide tablet 400 mg Daily    metoprolol succinate (TOPROL-XL) 24 hr tablet 50 mg Daily    morphine injection 2 mg Q6H PRN    mupirocin 2 % ointment 1 g TID    oxyCODONE-acetaminophen  mg per tablet 1 tablet Q6H PRN    sodium chloride 0.9% flush 10 mL PRN    sodium thiosulfate 12.5 gram/50 mL (250 mg/mL) injection 12.5 g Every Mon, Wed, Fri     Family History     Problem Relation (Age of Onset)    Arthritis Mother    Diabetes Mother, Father    Early death Sister, Sister    Heart disease Sister, Maternal Grandfather, Mother    Heart failure Mother    Hypertension Mother        Tobacco Use    Smoking status: Current Some Day Smoker     Packs/day: 0.50     Years: 45.00     Pack years: 22.50     Types: Cigarettes    Smokeless tobacco: Never Used   Substance and Sexual Activity    Alcohol use: No     Drug use: No    Sexual activity: Not on file     Review of Systems  Objective:     Vital Signs (Most Recent):  Temp: 98 °F (36.7 °C) (05/11/20 1330)  Pulse: (!) 117 (05/11/20 1330)  Resp: 16 (05/11/20 1330)  BP: 112/76 (05/11/20 1330)  SpO2: (!) 93 % (05/11/20 0855)  O2 Device (Oxygen Therapy): CPAP (05/11/20 0901) Vital Signs (24h Range):  Temp:  [97.5 °F (36.4 °C)-100.2 °F (37.9 °C)] 98 °F (36.7 °C)  Pulse:  [] 117  Resp:  [16-35] 16  SpO2:  [92 %-100 %] 93 %  BP: (112-217)/() 112/76     Weight: 65 kg (143 lb 4.8 oz) (05/09/20 2300)  Body mass index is 23.85 kg/m².  Body surface area is 1.73 meters squared.    I/O last 3 completed shifts:  In: 1052.6 [P.O.:200; I.V.:352.6; Other:500]  Out: 3500 [Other:3500]    Physical Exam  General:          Anxious and agitated   HEENT:           No scleral icterus, MM moist   NECK:             JVD.  CV:                  RRR with murmurs  PULM:             Crackles throughout lung fields; No rhonchi, wheezes. Supplemental O2 via NC  ABD:                Soft, nl BS, NT, ND.  EXTR:              No edema, clubbing, cyanosis.   NEURO:          Grossly Intact.  SKIN:               Wd thigh with dressing untouched  MS:                  No joint effusions.   PSYCH:           Normal Mood.  Access without s/s infection     Significant Labs:  BMP:   Recent Labs   Lab 05/11/20  1030   GLU 70   CL 99   CO2 25   BUN 37*   CREATININE 4.8*   CALCIUM 8.0*   MG 2.1     CBC:   Recent Labs   Lab 05/11/20  1030   WBC 6.28   RBC 2.59*   HGB 7.2*   HCT 23.5*      MCV 91   MCH 27.8   MCHC 30.6*     CMP:   Recent Labs   Lab 05/11/20  1030   GLU 70   CALCIUM 8.0*   ALBUMIN 2.7*   PROT 7.3   *   K 5.3*   CO2 25   CL 99   BUN 37*   CREATININE 4.8*   ALKPHOS 112   ALT 11   AST 22   BILITOT 1.3*     LFTs:   Recent Labs   Lab 05/11/20  1030   ALT 11   AST 22   ALKPHOS 112   BILITOT 1.3*   PROT 7.3   ALBUMIN 2.7*     PTH: No results for input(s): PTH in the last 168 hours.  All labs  within the past 24 hours have been reviewed.    Significant Imaging:  CXR 5/9:  FINDINGS: Single AP portable chest demonstrates persistence of cardiac  stent enlargement with evidence of upper cephalization of flow second  of mild uncompensated congestive heart failure. There is nominal  bilateral interlobular septal thickening and fluid tracks within the  minor fissure. Trace effusion occupies the right costophrenic angle.  The patient has a known previous replacement of the mitral valve.  There are coarsened interstitial changes noted in the patient's left  lung base. Calcification about the aortic knob. There are displaced  probably postoperative fractures of the right lateral chest wall.    IMPRESSION:  1. Cardiomegaly with evidence of uncompensated congestive heart  failure stable appearing.  2. Postop changes of previous mitral valve replacement.    Assessment/Plan:     Active Diagnoses:    Diagnosis Date Noted POA    PRINCIPAL PROBLEM:  ESRD (end stage renal disease) on HD M,W, F [N18.6] 05/11/2016 Yes     Chronic    Atrial fibrillation with RVR [I48.91] 05/09/2020 Yes    H/O mitral valve replacement [Z95.2] 03/17/2020 Not Applicable     Chronic    Chronic systolic heart failure [I50.22] 02/03/2020 Yes    PVD (peripheral vascular disease) [I73.9] 12/20/2019 Yes     Chronic    Chronic respiratory failure [J96.10] 05/13/2019 Yes     Chronic    Anemia due to chronic kidney disease [N18.9, D63.1] 07/25/2017 Yes    Pulmonary hypertension [I27.20] 07/25/2017 Yes     Chronic    Other persistent atrial fibrillation [I48.19] 07/18/2017 Yes    HTN (hypertension) [I10] 08/15/2013 Yes     Chronic      Problems Resolved During this Admission:     Assessment and Plan:   ESRD  -outpatient MWF at [facility]  -history of poor compliance  -stable iHD run with no complications or issues, tolerated -3L UF   -patient was seen and examined in dialysis suite, just had completed iHD session   -continue sodium  thiosulfate 12.5 gm with each treatment due to calciphylaxis (see below)     Hyperkalemia:  -serum K was 5.3mmol/L --> patient was run on 2K bath     HTN:  -controlled   -improved with iHD/ UF     Anemia:  -Hgb not at goal, was 7.2g/dL   -will get Fe studies  -EPO with iHD (12K units)     Calciphylaxis:  -continue sodium thiosulfate 12.5gm with iHD sessions   -continue to target aggressive control of Calcium and Phosphate   -pain management   -wound care consultation     Renal BMD:  -serum Ca and Phos at goal   -continue Phos binders   -2nd HPT- will get PTH level (could possibly benefit from Sensipar)     Afib RVR/CHF  -rate controlled, continued current medication regimen  -UF with HD for volume control (tolerated -3L UF this AM)     Patient has overall poor prognosis due to continued non-compliance with outpatient RRT treatments and development of calciphylaxis   Will continue to follow, please call with any questions or problems  Dr. Santiago is covering for Dr. Gupta and Dr. Juan Jose Santiago, DO  Nephrology  [facility]

## 2020-05-11 NOTE — PROGRESS NOTES
"[facility] Medicine Progress Note  Patient Name: Griselda Neely MRN: [id number]   Patient Class: IP- Inpatient  Length of Stay: 2   Admission Date: 5/9/2020  6:19 PM Attending Physician: Hola Rubio MD   Primary Care Provider: Kalie Neely MD Face-to-Face encounter date: 05/11/2020   Chief Complaint: Shortness of Breath (Onset today)    Assessment & Plan:   Griselda Neely is a 74 y.o. female admitted for    1. ESRD HDD Non compliance   2. Calciphylaxis  3. Chronic Opoid dependence with side effects  4. Acute on chronic CHF  5.    Plan  Patient more awake  Does not want hospice  Wants to continue dialysis and discharge to [facility]  Consulted  for LTAC    Discussed her case with sisters      Discharge Planning:   Discharge to [facility]    Subjective:    Interval History: Patient was seen in dialysis room during dialysis session. More awake, alert and oriented. No concerns. Wants to go to [facility] for wound care and long term dialysis    Review of Systems All other Review of Systems were found to be negative expect for that mentioned already in HPI.   Objective:   Physical Exam  /76   Pulse (!) 117   Temp 98 °F (36.7 °C)   Resp 16   Ht 5' 5" (1.651 m)   Wt 65 kg (143 lb 4.8 oz)   LMP  (LMP Unknown)   SpO2 (!) 93%   Breastfeeding? No   BMI 23.85 kg/m²   Vitals reviewed.    Constitutional: alert  HENT: Atraumatic.   Cardiovascular: Normal rate, regular rhythm and normal heart sounds.   Pulmonary/Chest: Coarse breath sounds bilaterally.   Abdominal: Soft. Bowel sounds are normal. Exhibits no distension and no mass. No tenderness  Neurological: alert  Skin: Skin is warm and dry.     Following labs were Reviewed   Recent Labs   Lab 05/11/20  1030   WBC 6.28   HGB 7.2*   HCT 23.5*      CALCIUM 8.0*   ALBUMIN 2.7*   PROT 7.3   *   K 5.3*   CO2 25   CL 99   BUN 37*   CREATININE 4.8*   ALKPHOS 112   ALT 11   AST 22   BILITOT 1.3*     No results found for: " POCTGLUCOSE     All labs within the past 24 hours have been reviewed  Microbiology Results (last 7 days)     ** No results found for the last 168 hours. **        X-Ray Chest AP Portable   Final Result          Inpatient medications  Scheduled Meds:   sodium chloride 0.9%   Intravenous Once    aspirin  81 mg Oral Daily    calcium acetate(phosphat bind)  667 mg Oral Daily    chlorhexidine  15 mL Mouth/Throat BID    isosorbide mononitrate  30 mg Oral Daily    magnesium oxide  400 mg Oral Daily    metoprolol succinate  50 mg Oral Daily    mupirocin  1 g Topical (Top) TID    sodium thiosulfate  12.5 g Intravenous Every Mon, Wed, Fri     Continuous Infusions:    PRN Meds:.sodium chloride 0.9%, acetaminophen, dextrose 50%, dextrose 50%, glucagon (human recombinant), glucose, glucose, morphine, oxyCODONE-acetaminophen, sodium chloride 0.9%    Above encounter included review of the medical records, interviewing and examining the patient face-to-face, discussion with family and other health care providers, ordering and interpreting lab/test results and formulating a plan of care.     Medical Decision Making:    [] Low Complexity  [x] Moderate Complexity  [] High Complexity    Hola Rubio  [facility] Hospitalist  05/11/2020

## 2020-05-11 NOTE — PLAN OF CARE
05/11/20 1533   Post-Acute Status   Post-Acute Authorization Placement   Post-Acute Placement Status Referrals Sent   Patient choice form signed by patient/caregiver List with quality metrics by geographic area provided   Discharge Delays None known at this time   Discharge Plan   Discharge Plan A Long-term acute care facility (LTAC)   Discharge Plan B Hospice/home       Patient choice form signed and scanned into chart, referral sent to hospitals and [facility] extended stay.  Cm to follow.

## 2020-05-11 NOTE — PHYSICIAN QUERY
PT Name: Griselda Neely  MR #: [id number]     CDS/: Demi Abel Pe               Contact information: [phone]  This form is a permanent document in the medical record.     Query Date: May 11, 2020    Physician Query - Neurological Condition Clarification    By submitting this query, we are merely seeking further clarification of documentation to reflect the severity of illness of your patient. Please utilize your independent clinical judgment when addressing the question(s) below.    The Medical record reflects the following:    Progress note documentation - Chronic Opioid Dependence w/ side effects - noted confusion at home  Per Nephrologist exam - Confusion, agitation, garbled speech and hollering and fidgeting         Encephalopathy- is a general term for any diffuse disease of the brain that alters brain function or structure. Treatment of the cognitive dysfunction varies but is ultimately dependent on the treatment of the underlying condition.    Major Symptoms of Encephalopathy - Decreased level of consciousness, fluctuating alertness/concentration, confusion, agitation, lethargy, somnolence, drowsiness, obtundation, stupor, or coma.         References: National Institutes of Healths (NIH) National [place] of Neurological Disorders and Strokes;  HCPro 2016; Advisory Board     Clinical Guidelines:   These guidelines will set system standards to assist providers in managing, documentation, and coding of encephalopathy. The intent of this document is to serve as a system guideline, not replace the providers clinical judgment:  Provider, please specify the diagnosis or diagnoses associated with above clinical findings.  [   ] Delirium due to __________________________   [   ] Anoxic/Hypoxic Encephalopathy- Brain damage due to anoxia/hypoxia   [   ] Hepatic Encephalopathy - Due to liver disease/failure   [   ] Hypertensive Encephalopathy - Signs and/or symptoms of cerebral edema caused by severe and/or  sudden rises in BP   [   ] Metabolic Encephalopathy - Due to electrolye imbalance, metabolic derangements, or infections processes, includes Septic Encephalopathy   [ x ] Toxic Encephalopathy - Due to drugs, chemicals, or other toxic substances   [   ] Traumatic Encephalopathy - Due to concussion or other structural brain injury   [   ] Wernickes Encephalopathy - Neurological symptoms caused by biochemical lesions of the central nervous system after exhaustion of B-vitamin reserves, in particular thiamine. Commonly related to alcohol use   [   ] Other Encephalopathy - Includes uremic encephalopathy   [   ] Unspecified Encephalopathy      [   ] Other Neurological Condition-  Includes altered mental status. (please specify condition): _________   [   ]  Clinically Undetermined     Please document in your progress notes daily for the duration of treatment until resolved, and include in your discharge summary.

## 2020-05-11 NOTE — PLAN OF CARE
05/10/20 1945   Patient Assessment/Suction   Level of Consciousness (AVPU) alert   Respiratory Effort Normal;Unlabored   Expansion/Accessory Muscles/Retractions no use of accessory muscles   All Lung Fields Breath Sounds coarse;diminished   Cough Type good   Secretions Amount moderate   Secretions Color clear   Secretions Characteristics thin   PRE-TX-O2   O2 Device (Oxygen Therapy) nasal cannula   Flow (L/min) 3   Oxygen Concentration (%) 32   SpO2 (!) 94 %   Pulse Oximetry Type Continuous   $ Pulse Oximetry - Multiple Charge Pulse Oximetry - Multiple   Pulse 98   Resp (!) 29   Ready to Wean/Extubation Screen   FIO2<=50 (chart decimal) 0.32   Preset CPAP/BiPAP Settings   Mode Of Delivery BiPAP S/T;Standby   pt. Denies sob and declines bi-pap at this time

## 2020-05-11 NOTE — PHYSICIAN QUERY
PT Name: Griselda Neely  MR #: [id number]    Physician Query Form - Heart  Condition Clarification     CDS/: Demi Abel Pe               Contact information: [phone]  This form is a permanent document in the medical record.     Query Date: May 11, 2020    By submitting this query, we are merely seeking further clarification of documentation. Please utilize your independent clinical judgment when addressing the question(s) below.    The medical record contains the following  The Diagnosis of Acute on Chronic CHF is noted  Echo - 01/2020 - EF 25% grade IV Diastolic Dysfunction        Heart failure (HF) can be acute, chronic or both. It is generally further specificed as systolic, diastolic, or combined. Lastly, it is important to identify an underlying etiology if known or suspected.     Common clues to acute exacerbation:  Rapidly progressive symptoms (w/in 2 weeks of presentation), using IV diuretics to treat, using supplemental O2, pulmonary edema on Xray, MI w/in 4 weeks, and/or BNP >500    Systolic Heart Failure: is defined as chart documentation of a left ventricular ejection fraction (LVEF) less than 40%     Diastolic Heart Failure: is defined as a left ventricular ejection fraction (LVEF) greater than 40%   +      Evidence of diastolic dysfunction on echocardiography OR    Right heart catheterization wedge pressure above 12 mm Hg OR    Left heart catheterization left ventricular end diastolic pressure 18 mm Hg or above.    References: *American Heart Association    The clinical guidelines noted below are only system guidelines, and do not replace the providers clinical judgment.     Provider, please specify the diagnosis associated with above clinical findings  [   ] Acute Systolic Heart Failure - New diagnosis.  EF < 40%  and acute HF symptoms documented     [   ] Acute on Chronic Systolic Heart Failure- Pre-existing systolic HF diagnosis.  EF < 40%  and acute HF symptoms documented   [   ]  Chronic Systolic Heart Failure - Pre-existing systolic HF diagnosis.  EF < 40%  without  acute HF symptoms documented    [   ] Acute Diastolic Heart Failure - New diagnosis.  EF > 40%  and acute HF symptoms documented   [   ] Acute on Chronic Diastolic Heart Failure -    Pre-existing diastoic HF diagnosis.  EF > 40%  and acute HF symptoms documented       [   ] Chronic Diastolic Heart Failure - Pre-existing diastolic HF diagnosis.  EF > 40%  without  acute HF symptoms documented   [   ] Acute Combined Systolic and Diastolic Heart Failure    [  x ] Acute on Chronic Combined Systolic and Diastolic Heart Failure      [   ] Chronic Combined Systolic and Diastolic Heart Failure   [   ] Other Type of Heart Failure (please specify type):   [   ] Heart Failure Ruled Out   [   ] Other (please specify):   [  ] Clinically Undetermined                           Please document in your progress notes daily for the duration of treatment until resolved and include in your discharge summary.

## 2020-05-12 VITALS
BODY MASS INDEX: 23.88 KG/M2 | WEIGHT: 143.31 LBS | TEMPERATURE: 98 F | RESPIRATION RATE: 20 BRPM | HEART RATE: 79 BPM | OXYGEN SATURATION: 100 % | SYSTOLIC BLOOD PRESSURE: 113 MMHG | HEIGHT: 65 IN | DIASTOLIC BLOOD PRESSURE: 66 MMHG

## 2020-05-12 LAB
ALBUMIN SERPL BCP-MCNC: 2.6 G/DL (ref 3.5–5.2)
ALP SERPL-CCNC: 105 U/L (ref 55–135)
ALT SERPL W/O P-5'-P-CCNC: 11 U/L (ref 10–44)
ANION GAP SERPL CALC-SCNC: 10 MMOL/L (ref 8–16)
AST SERPL-CCNC: 18 U/L (ref 10–40)
BASOPHILS # BLD AUTO: 0.02 K/UL (ref 0–0.2)
BASOPHILS NFR BLD: 0.3 % (ref 0–1.9)
BILIRUB SERPL-MCNC: 0.8 MG/DL (ref 0.1–1)
BUN SERPL-MCNC: 28 MG/DL (ref 8–23)
CALCIUM SERPL-MCNC: 8 MG/DL (ref 8.7–10.5)
CHLORIDE SERPL-SCNC: 101 MMOL/L (ref 95–110)
CO2 SERPL-SCNC: 26 MMOL/L (ref 23–29)
CREAT SERPL-MCNC: 3.5 MG/DL (ref 0.5–1.4)
DIFFERENTIAL METHOD: ABNORMAL
EOSINOPHIL # BLD AUTO: 0.1 K/UL (ref 0–0.5)
EOSINOPHIL NFR BLD: 1.9 % (ref 0–8)
ERYTHROCYTE [DISTWIDTH] IN BLOOD BY AUTOMATED COUNT: 19.3 % (ref 11.5–14.5)
EST. GFR  (AFRICAN AMERICAN): 14.2 ML/MIN/1.73 M^2
EST. GFR  (NON AFRICAN AMERICAN): 12.3 ML/MIN/1.73 M^2
GLUCOSE SERPL-MCNC: 82 MG/DL (ref 70–110)
HBV SURFACE AG SERPL QL IA: NEGATIVE
HCT VFR BLD AUTO: 23.8 % (ref 37–48.5)
HGB BLD-MCNC: 7.2 G/DL (ref 12–16)
IMM GRANULOCYTES # BLD AUTO: 0.02 K/UL (ref 0–0.04)
IMM GRANULOCYTES NFR BLD AUTO: 0.3 % (ref 0–0.5)
LYMPHOCYTES # BLD AUTO: 1 K/UL (ref 1–4.8)
LYMPHOCYTES NFR BLD: 16.1 % (ref 18–48)
MAGNESIUM SERPL-MCNC: 1.9 MG/DL (ref 1.6–2.6)
MCH RBC QN AUTO: 27.7 PG (ref 27–31)
MCHC RBC AUTO-ENTMCNC: 30.3 G/DL (ref 32–36)
MCV RBC AUTO: 92 FL (ref 82–98)
MONOCYTES # BLD AUTO: 0.9 K/UL (ref 0.3–1)
MONOCYTES NFR BLD: 15.1 % (ref 4–15)
NEUTROPHILS # BLD AUTO: 3.9 K/UL (ref 1.8–7.7)
NEUTROPHILS NFR BLD: 66.3 % (ref 38–73)
NRBC BLD-RTO: 0 /100 WBC
PHOSPHATE SERPL-MCNC: 3.3 MG/DL (ref 2.7–4.5)
PLATELET # BLD AUTO: 240 K/UL (ref 150–350)
PMV BLD AUTO: 11.3 FL (ref 9.2–12.9)
POTASSIUM SERPL-SCNC: 4 MMOL/L (ref 3.5–5.1)
PROT SERPL-MCNC: 7 G/DL (ref 6–8.4)
RBC # BLD AUTO: 2.6 M/UL (ref 4–5.4)
SODIUM SERPL-SCNC: 137 MMOL/L (ref 136–145)
WBC # BLD AUTO: 5.9 K/UL (ref 3.9–12.7)

## 2020-05-12 PROCEDURE — 25000003 PHARM REV CODE 250: Performed by: INTERNAL MEDICINE

## 2020-05-12 PROCEDURE — 99900035 HC TECH TIME PER 15 MIN (STAT)

## 2020-05-12 PROCEDURE — 80053 COMPREHEN METABOLIC PANEL: CPT

## 2020-05-12 PROCEDURE — 84100 ASSAY OF PHOSPHORUS: CPT

## 2020-05-12 PROCEDURE — 94761 N-INVAS EAR/PLS OXIMETRY MLT: CPT

## 2020-05-12 PROCEDURE — 83735 ASSAY OF MAGNESIUM: CPT

## 2020-05-12 PROCEDURE — 25000003 PHARM REV CODE 250

## 2020-05-12 PROCEDURE — 27000221 HC OXYGEN, UP TO 24 HOURS

## 2020-05-12 PROCEDURE — 85025 COMPLETE CBC W/AUTO DIFF WBC: CPT

## 2020-05-12 PROCEDURE — 36415 COLL VENOUS BLD VENIPUNCTURE: CPT

## 2020-05-12 PROCEDURE — 94660 CPAP INITIATION&MGMT: CPT

## 2020-05-12 RX ORDER — SODIUM CHLORIDE 9 MG/ML
INJECTION, SOLUTION INTRAVENOUS ONCE
Status: CANCELLED | OUTPATIENT
Start: 2020-05-13

## 2020-05-12 RX ORDER — SODIUM CHLORIDE 9 MG/ML
INJECTION, SOLUTION INTRAVENOUS
Status: CANCELLED | OUTPATIENT
Start: 2020-05-13

## 2020-05-12 RX ADMIN — CHLORHEXIDINE GLUCONATE 15 ML: 1.2 RINSE ORAL at 09:05

## 2020-05-12 RX ADMIN — MAGNESIUM OXIDE 400 MG: 400 TABLET ORAL at 09:05

## 2020-05-12 RX ADMIN — MUPIROCIN 1 G: 20 OINTMENT TOPICAL at 09:05

## 2020-05-12 RX ADMIN — ASPIRIN 81 MG: 81 TABLET, DELAYED RELEASE ORAL at 09:05

## 2020-05-12 RX ADMIN — CALCIUM ACETATE 667 MG: 667 CAPSULE ORAL at 09:05

## 2020-05-12 RX ADMIN — ISOSORBIDE MONONITRATE 30 MG: 30 TABLET, EXTENDED RELEASE ORAL at 09:05

## 2020-05-12 RX ADMIN — METOPROLOL SUCCINATE 50 MG: 50 TABLET, FILM COATED, EXTENDED RELEASE ORAL at 09:05

## 2020-05-12 NOTE — PLAN OF CARE
05/12/20 0732   Patient Assessment/Suction   Level of Consciousness (AVPU) alert   Respiratory Effort Normal;Unlabored   Expansion/Accessory Muscles/Retractions expansion symmetric;no retractions;no use of accessory muscles   PRE-TX-O2   O2 Device (Oxygen Therapy) Oxymask   $ Is the patient on Low Flow Oxygen? Yes   Flow (L/min) 3   SpO2 96 %   Pulse Oximetry Type Intermittent   $ Pulse Oximetry - Multiple Charge Pulse Oximetry - Multiple   Pulse 105   Resp 18   Preset CPAP/BiPAP Settings   Mode Of Delivery BiPAP;Standby   $ CPAP/BiPAP Daily Charge BiPAP/CPAP Daily   $ Initial CPAP/BiPAP Setup? No   $ Is patient using? No/refused   Equipment Type V60   Respiratory Evaluation   $ Care Plan Tech Time 15 min

## 2020-05-12 NOTE — PROGRESS NOTES
[facility]  Nephrology  Consult Note    Patient Name: Griselda Neely  MRN: [id number]  Admission Date: 5/9/2020  Hospital Length of Stay: 3 days  Attending Provider: Hola Rubio MD   Primary Care Physician: Kalie Neely MD  Principal Problem:ESRD (end stage renal disease)    Consults  Subjective:     HPI:  75 y/o AAF with a history of ESRD (outpatient MWF iHD at [facility]) complicated by poor compliance, Calciphylaxis, CHF, Atrial Fibrillation, HTN, PVD who was admitted with Afib with RVR, CHF/ Hypervolemia, Acute hypoxic respiratory failure - requiring emergent iHD on 5/10. Patient had routine iHD session on 5/11  Patient was seen and examined in her room with family at bedside. She had stable iHd session yesterday (5/11) with no complications or issues  (patient tolerated -3L UF). Patient denies any current CP, fever/ chills. Still requiring supplemental O2, but describes improvement of dyspnea. VSS    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    both legs    CHF (congestive heart failure)     Encounter for blood transfusion 03/2016    Gout     Hypertension     Mitral valve regurgitation     Osteoarthritis     Pancreatitis     Peripheral vascular disease     Peritoneal dialysis catheter in place     Pneumonia 09/09/2017    Renal failure        Past Surgical History:   Procedure Laterality Date    ACHILLES TENDON SURGERY Right     ANGIOGRAPHY OF ARTERIOVENOUS SHUNT Right 1/7/2020    Procedure: Fistulogram with Possible Intervention;  Surgeon: Joseph Loyola MD;  Location: [facility] CATH/EP LAB;  Service: Cardiology;  Laterality: Right;    ANGIOGRAPHY OF LOWER EXTREMITY Left 3/18/2020    Procedure: Angiogram Extremity Unilateral;  Surgeon: Ali Khoobehi, MD;  Location: [facility] CATH/EP LAB;  Service: Cardiology;  Laterality: Left;    APPENDECTOMY      CARDIAC CATHETERIZATION  07/03/2017    CHOLECYSTECTOMY      heal surgery Right      HYSTERECTOMY      INSERTION OF STENT INTO PERIPHERAL VESSEL N/A 1/7/2020    Procedure: INSERTION, STENT, VESSEL, PERIPHERAL;  Surgeon: Joseph Loyola MD;  Location: [facility] CATH/EP LAB;  Service: Cardiology;  Laterality: N/A;    PARATHYROIDECTOMY      PARATHYROIDECTOMY  07/13/2017    PERCUTANEOUS TRANSLUMINAL ANGIOPLASTY OF ARTERIOVENOUS FISTULA N/A 1/7/2020    Procedure: PTA, AV FISTULA;  Surgeon: Joseph Loyola MD;  Location: [facility] CATH/EP LAB;  Service: Cardiology;  Laterality: N/A;    PERITONEAL CATHETER INSERTION      RENAL BIOPSY      vocal cord nodule         Review of patient's allergies indicates:  No Known Allergies  Current Facility-Administered Medications   Medication Frequency    0.9%  NaCl infusion PRN    0.9%  NaCl infusion Once    acetaminophen tablet 650 mg Q6H PRN    aspirin EC tablet 81 mg Daily    calcium acetate(phosphat bind) capsule 667 mg Daily    chlorhexidine 0.12 % solution 15 mL BID    dextrose 50% injection 12.5 g PRN    dextrose 50% injection 25 g PRN    glucagon (human recombinant) injection 1 mg PRN    glucose chewable tablet 16 g PRN    glucose chewable tablet 24 g PRN    isosorbide mononitrate 24 hr tablet 30 mg Daily    magnesium oxide tablet 400 mg Daily    metoprolol succinate (TOPROL-XL) 24 hr tablet 50 mg Daily    morphine injection 2 mg Q6H PRN    mupirocin 2 % ointment 1 g TID    oxyCODONE-acetaminophen  mg per tablet 1 tablet Q6H PRN    sodium chloride 0.9% flush 10 mL PRN    sodium thiosulfate 12.5 gram/50 mL (250 mg/mL) injection 12.5 g Every Mon, Wed, Fri     Family History     Problem Relation (Age of Onset)    Arthritis Mother    Diabetes Mother, Father    Early death Sister, Sister    Heart disease Sister, Maternal Grandfather, Mother    Heart failure Mother    Hypertension Mother        Tobacco Use    Smoking status: Current Some Day Smoker     Packs/day: 0.50     Years: 45.00     Pack years: 22.50     Types: Cigarettes    Smokeless  tobacco: Never Used   Substance and Sexual Activity    Alcohol use: No    Drug use: No    Sexual activity: Not on file     Review of Systems  Objective:     Vital Signs (Most Recent):  Temp: 98 °F (36.7 °C) (05/12/20 0818)  Pulse: (!) 125 (05/12/20 0818)  Resp: 19 (05/12/20 0818)  BP: 122/84 (05/12/20 0818)  SpO2: 96 % (05/12/20 0818)  O2 Device (Oxygen Therapy): Oxymask (05/12/20 0117) Vital Signs (24h Range):  Temp:  [98 °F (36.7 °C)-98.6 °F (37 °C)] 98 °F (36.7 °C)  Pulse:  [] 125  Resp:  [16-20] 19  SpO2:  [96 %-100 %] 96 %  BP: (112-162)/(52-84) 122/84     Weight: 65 kg (143 lb 4.8 oz) (05/09/20 2300)  Body mass index is 23.85 kg/m².  Body surface area is 1.73 meters squared.    I/O last 3 completed shifts:  In: 857.5 [P.O.:220; I.V.:137.5; Other:500]  Out: 3500 [Other:3500]    Physical Exam:  General:          NAD, alert and oriented   HEENT:           No scleral icterus, MM moist   NECK:             JVD.  CV:                  RRR with murmurs  PULM:             Bilateral basilar crackles; No rhonchi, wheezes. Supplemental O2 in place   ABD:                Soft, nl BS, NT, ND.  EXTR:              No edema, clubbing, cyanosis.   NEURO:          Grossly Intact.  SKIN:               Wound thigh with dressing untouched  MS:                  No joint effusions.   PSYCH:           Normal Mood.  Access without s/s infection    Significant Labs:  BMP:   Recent Labs   Lab 05/12/20  0525   GLU 82      CO2 26   BUN 28*   CREATININE 3.5*   CALCIUM 8.0*   MG 1.9     CBC:   Recent Labs   Lab 05/12/20  0525   WBC 5.90   RBC 2.60*   HGB 7.2*   HCT 23.8*      MCV 92   MCH 27.7   MCHC 30.3*     All labs within the past 24 hours have been reviewed.      Assessment/Plan:     Active Diagnoses:    Diagnosis Date Noted POA    PRINCIPAL PROBLEM:  ESRD (end stage renal disease) on HD M,W, F [N18.6] 05/11/2016 Yes     Chronic    Atrial fibrillation with RVR [I48.91] 05/09/2020 Yes    H/O mitral valve  replacement [Z95.2] 03/17/2020 Not Applicable     Chronic    Chronic systolic heart failure [I50.22] 02/03/2020 Yes    PVD (peripheral vascular disease) [I73.9] 12/20/2019 Yes     Chronic    Chronic respiratory failure [J96.10] 05/13/2019 Yes     Chronic    Anemia due to chronic kidney disease [N18.9, D63.1] 07/25/2017 Yes    Pulmonary hypertension [I27.20] 07/25/2017 Yes     Chronic    Other persistent atrial fibrillation [I48.19] 07/18/2017 Yes    HTN (hypertension) [I10] 08/15/2013 Yes     Chronic      Problems Resolved During this Admission:     Assessment and Plan:  ESRD  -outpatient MWF at [facility]  -history of poor compliance  -stable iHD session (both on 5/10 and 5/11)  -no indications for RRT this AM, plan for next iHD session tomorrow (5/13)  -continue sodium thiosulfate 12.5 gm with each treatment due to calciphylaxis (see below)      Hyperkalemia:  -resolved      HTN:  -controlled   -improved with iHD/ UF      Anemia:  -Hgb not at goal, was 7.2g/dL   -will get Fe studies (ordered - results pending)  -EPO with iHD (12K units)      Calciphylaxis:  -continue sodium thiosulfate 12.5gm with iHD sessions   -continue to target aggressive control of Calcium and Phosphate   -pain management   -wound care consultation      Renal BMD:  -serum Ca and Phos at goal   -continue Phos binders   -2nd HPT- will get PTH level (could possibly benefit from Sensipar)      Afib RVR/CHF  -rate controlled, continued current medication regimen  -UF with HD for volume control     Awaiting placement   Patient has overall poor prognosis due to continued non-compliance with outpatient RRT treatments and development of calciphylaxis   Will continue to follow, please call with any questions or problems  Dr. Santiago is covering for Dr. Gupta and Dr. Juan Jose Santiago, DO  Nephrology  [facility]

## 2020-05-12 NOTE — PLAN OF CARE
Problem: Wound  Goal: Optimal Wound Healing  Outcome: Ongoing, Progressing     Problem: Oral Intake Inadequate  Goal: Improved Oral Intake  Outcome: Ongoing, Progressing         Recommendation/Intervention:   1. RD to add Michael BID fo wound healing   2. Nepro TID (1275 kcal, 57 g pro) to aid in meeting needs   3.  to assist with meal choices daily   Goals: 1. Pt to meet at least 75% estimated needs via PO diet/supplements

## 2020-05-12 NOTE — DISCHARGE SUMMARY
[facility]  Discharge Summary  Patient Name: Griselda Neely MRN: [id number]   Patient Class: IP- Inpatient  Length of Stay: 3   Admission Date: 5/9/2020  6:19 PM Attending Physician: Hola Rubio MD   Primary Care Provider: Kalie Neely MD Face-to-Face encounter date: 05/12/2020   Chief Complaint: Shortness of Breath (Onset today)    Date of Discharge: 5/12/2020  Discharge Disposition: LTAC  Condition: Stable    Reason for Hospitalization   Primary Diagnosis: ESRD    Secondary Diagnosis: Calciphylaxis, Chronic opoid dependence, acute on chronic combined systolic and diastolic heart failure      Patient Active Problem List   Diagnosis    HTN (hypertension)    PND (paroxysmal nocturnal dyspnea)    Tobacco dependence    ESRD (end stage renal disease) on HD M,W, F    Hyperparathyroidism    Other persistent atrial fibrillation    Anemia due to chronic kidney disease    Pulmonary hypertension    Non-rheumatic mitral regurgitation    DNR (do not resuscitate)    Chronic respiratory failure    Coronary arteriosclerosis in native artery    Gastroesophageal reflux disease    S/P parathyroidectomy    Tension-type headache    Hyperparathyroidism due to renal insufficiency    Functional dyspepsia    HLD (hyperlipidemia)    PVD (peripheral vascular disease)    Left atrial thrombus    Encephalopathy    Chronic systolic heart failure    Peripheral vascular disease now with left lower extremity occlusion    H/O mitral valve replacement    Gout    Anemia    Chronic pain on chronic narcotics    Left leg pain    Atrial fibrillation with RVR       Brief History of Present Illness    Griselda Neely is a 74 y.o.  female who  has a past medical history of Anemia, Calciphylaxis (07/2017), CHF (congestive heart failure), Encounter for blood transfusion (03/2016), Gout, Hypertension, Mitral valve regurgitation, Osteoarthritis, Pancreatitis, Peripheral vascular disease, Peritoneal  "dialysis catheter in place, Pneumonia (09/09/2017), and Renal failure.. The patient presented to [facility] on 5/9/2020 with a primary complaint of Shortness of Breath (Onset today)    For the full HPI please refer to the History & Physical from this admission.    Hospital Course By Problem with Pertinent Findings     Patient is a 74-year old female admitted to our hospital for ESRD. She was fluid overload due to non compliance (not completing full dialysis sessions). She was observed in ICU and received emergent dialysis. Wound therapy was consulted for wounds due to calciphylaxis. She was encephalopathic likely due to pain medicatons which improved after stopping opoid medications. S/w was consulted for LTAC. She was accepted today. She will be discharged in stable condition to LTAC.       Physical Exam  /81 (BP Location: Left arm, Patient Position: Lying)   Pulse 105   Temp 97.3 °F (36.3 °C) (Axillary)   Resp 19   Ht 5' 5" (1.651 m)   Wt 65 kg (143 lb 4.8 oz)   LMP  (LMP Unknown)   SpO2 98%   Breastfeeding? No   BMI 23.85 kg/m²   Vitals reviewed.    Constitutional: No distress.   HENT: Atraumatic.   Cardiovascular: Normal rate, regular rhythm and normal heart sounds.   Pulmonary/Chest: Effort normal. Clear to auscultation bilaterally. No wheezes.   Abdominal: Soft. Bowel sounds are normal. Exhibits no distension and no mass. No tenderness  Neurological: Alert.   Skin: Skin is warm and dry.     Following labs were Reviewed   Recent Labs   Lab 05/12/20  0525   WBC 5.90   HGB 7.2*   HCT 23.8*      CALCIUM 8.0*   ALBUMIN 2.6*   PROT 7.0      K 4.0   CO2 26      BUN 28*   CREATININE 3.5*   ALKPHOS 105   ALT 11   AST 18   BILITOT 0.8     No results found for: POCTGLUCOSE     All labs within the past 24 hours have been reviewed    Microbiology Results (last 7 days)     ** No results found for the last 168 hours. **        X-Ray Chest AP Portable   Final Result          No " results found for this or any previous visit.      Consultants and Procedures   Consultants:  Nephrology    Procedures:   None    Discharge Information:   Diet:  Resume regular diet    Physical Activity:  Activity as tolerated    Instructions:  1. Take all medications as prescribed  2. Keep all follow-up appointments  3. Return to the hospital or call your primary care physicians if any worsening symptoms such as chest pain, shortness of breath occur.      Follow-Up Appointments:  1. Please call your primary care physician to schedule an appointment in 1 week time.      Pending laboratory work/Tests to be performed/followed by the Primary care Physician: None    The patient was discharged in the care of her parents//wife/family/caregiver, with discharge instructions were reviewed in written and verbal form. All pertinent questions were discussed and prescriptions were provided. The importance of making follow up appointments and compliance of medications has been stressed repeatedly. The patient will follow up in 1 week or sooner as needed with the PCP, and the patient is on board with the plan. Upon discharge, patient needs to be on following medications.    Discharge Medications:     Medication List      CHANGE how you take these medications    mupirocin 2 % ointment  Commonly known as:  BACTROBAN  Apply topically 3 (three) times daily.  What changed:  how much to take        CONTINUE taking these medications    aspirin 81 MG EC tablet  Commonly known as:  ECOTRIN     calcium acetate(phosphat bind) 667 mg capsule  Commonly known as:  PHOSLO     diltiaZEM 180 MG 24 hr capsule  Commonly known as:  CARDIZEM CD     isosorbide mononitrate 30 MG 24 hr tablet  Commonly known as:  IMDUR     losartan 25 MG tablet  Commonly known as:  COZAAR     magnesium oxide 400 mg (241.3 mg magnesium) tablet  Commonly known as:  MAG-OX     metoprolol succinate 50 MG 24 hr tablet  Commonly known as:  TOPROL-XL  Take 1 tablet (50  mg total) by mouth once daily.     oxyCODONE-acetaminophen  mg per tablet  Commonly known as:  PERCOCET     warfarin 1 MG tablet  Commonly known as:  COUMADIN  Take 2 tablets (2 mg total) by mouth Daily.              I spent 40 minutes preparing the discharge including reviewing records from previous encounters, preparation of discharge summary, assessing and final examination of the patient, discharge medicine reconciliation, discussing plan of care, follow up and education and prescriptions.       Hola Rubio  [facility] Hospitalist  05/12/2020

## 2020-05-12 NOTE — CONSULTS
"[facility]  Adult Nutrition   Consult Note    SUMMARY     Recommendations  Recommendation/Intervention:   1. RD to add Michael BID fo wound healing   2. Nepro TID (1275 kcal, 57 g pro) to aid in meeting needs   3.  to assist with meal choices daily   Goals: 1. Pt to meet at least 75% estimated needs via PO diet/supplements   Nutrition Goal Status: new  Communication of RD Recs: reviewed with RN    Dietitian Rounds Brief    Pt seen 2' consult for wound. Wound care following. Unable to interview pt despite multiple attempts. Intake poor of hospital provided meals, but eating meals provided by family per RN. Noted calciphylaxis present to LE. Will add Michael BID for healing along with Nepro supplement to aid in meeting needs.    Reason for Assessment  Reason For Assessment: consult  Diagnosis: renal disease  Relevant Medical History: ESRD on HD, CHF, afib, HTN, PVD    Nutrition Risk Screen  Nutrition Risk Screen: large or nonhealing wound, burn or pressure injury       Wound 01/03/20 1723 Other (comment) medial Thigh #1-Wound Image: Images linked          Nutrition/Diet History  Spiritual, Cultural Beliefs, Taoism Practices, Values that Affect Care: yes  Food Allergies: NKFA  Factors Affecting Nutritional Intake: decreased appetite    Anthropometrics  Temp: 98 °F (36.7 °C)  Height Method: Stated  Height: 5' 5" (165.1 cm)  Height (inches): 65 in  Weight Method: Bed Scale  Weight: 65 kg (143 lb 4.8 oz)  Weight (lb): 143.3 lb  Ideal Body Weight (IBW), Female: 125 lb  % Ideal Body Weight, Female (lb): 114.64 %  BMI (Calculated): 23.8  BMI Grade: 18.5-24.9 - normal       Weight History:  Wt Readings from Last 10 Encounters:   05/12/20 65 kg (143 lb 4.8 oz)   05/01/20 65.8 kg (145 lb)   04/23/20 62.6 kg (138 lb 0.1 oz)   04/14/20 59.3 kg (130 lb 11.2 oz)   03/19/20 58.7 kg (129 lb 6.4 oz)   02/19/20 (P) 64 kg (141 lb)   02/05/20 60.8 kg (134 lb 0.6 oz)   01/08/20 53.4 kg (117 lb 10.2 oz)   12/21/19 59.4 " kg (130 lb 14.4 oz)   10/25/19 58.3 kg (128 lb 8.5 oz)       Lab/Procedures/Meds: Pertinent Labs Reviewed  Clinical Chemistry:  Recent Labs   Lab 05/10/20  0709 05/11/20  1030 05/12/20  0525    133* 137   K 5.0 5.3* 4.0    99 101   CO2 27 25 26   GLU 60* 70 82   BUN 22 37* 28*   CREATININE 3.5* 4.8* 3.5*   CALCIUM 8.2* 8.0* 8.0*   PROT  --  7.3 7.0   ALBUMIN 2.8* 2.7* 2.6*   BILITOT  --  1.3* 0.8   ALKPHOS  --  112 105   AST  --  22 18   ALT  --  11 11   ANIONGAP 10 9 10   ESTGFRAFRICA 14.2* 9.7* 14.2*   EGFRNONAA 12.3* 8.4* 12.3*   MG  --  2.1 1.9   PHOS 2.8 4.6* 3.3     CBC:   Recent Labs   Lab 05/12/20  0525   WBC 5.90   RBC 2.60*   HGB 7.2*   HCT 23.8*      MCV 92   MCH 27.7   MCHC 30.3*     Cardiac Profile:  Recent Labs   Lab 05/09/20  1925   BNP >4,500*   CPK 34   CPKMB 1.4   TROPONINI <0.030       Medications: Pertinent Medications reviewed  Scheduled Meds:   sodium chloride 0.9%   Intravenous Once    aspirin  81 mg Oral Daily    calcium acetate(phosphat bind)  667 mg Oral Daily    chlorhexidine  15 mL Mouth/Throat BID    isosorbide mononitrate  30 mg Oral Daily    magnesium oxide  400 mg Oral Daily    metoprolol succinate  50 mg Oral Daily    sodium thiosulfate  12.5 g Intravenous Every Mon, Wed, Fri     Continuous Infusions:  PRN Meds:.sodium chloride 0.9%, acetaminophen, dextrose 50%, dextrose 50%, glucagon (human recombinant), glucose, glucose, morphine, oxyCODONE-acetaminophen, sodium chloride 0.9%    Estimated/Assessed Needs  Weight Used For Calorie Calculations: 65 kg (143 lb 4.8 oz)  Energy Calorie Requirements (kcal): 0957-2410 (25-30 kcal/kg)  Energy Need Method: Kcal/kg  Protein Requirements: 78-97 g (1.2-1.5 g/kg)   Weight Used For Protein Calculations: 65 kg (143 lb 4.8 oz)  Fluid Requirements (mL): 1000 ml + UOP or per MD  Estimated Fluid Requirement Method: other (see comments)  RDA Method (mL): 1625       Nutrition Prescription Ordered  Current Diet Order: renal      Evaluation of Received Nutrient/Fluid Intake  Energy Calories Required: not meeting needs  Protein Required: not meeting needs  Tolerance: tolerating     Intake/Output Summary (Last 24 hours) at 5/12/2020 1315  Last data filed at 5/11/2020 1330  Gross per 24 hour   Intake 500 ml   Output 3500 ml   Net -3000 ml        % Meal Intake: 0 - 25 %    Nutrition Risk  Level of Risk/Frequency of Follow-up: high     Monitor and Evaluation  Food and Nutrient Intake: food and beverage intake, energy intake  Food and Nutrient Adminstration: diet order  Physical Activity and Function: nutrition-related ADLs and IADLs  Anthropometric Measurements: weight, weight change  Biochemical Data, Medical Tests and Procedures: glucose/endocrine profile, gastrointestinal profile, electrolyte and renal panel  Nutrition-Focused Physical Findings: overall appearance     Nutrition Follow-Up  RD Follow-up?: Yes    Treasure Daley RD 05/12/2020 1:15 PM

## 2020-05-12 NOTE — PLAN OF CARE
05/12/20 1659   Final Note   Assessment Type Final Discharge Note   Anticipated Discharge Disposition LTAC   Sent discharge orders to [facility]  LTAC via right fax. Ayah will call nurses station for report and set up transportation.

## 2020-05-12 NOTE — PROGRESS NOTES
"[facility] Medicine Progress Note  Patient Name: Griselda Neely MRN: [id number]   Patient Class: IP- Inpatient  Length of Stay: 3   Admission Date: 5/9/2020  6:19 PM Attending Physician: Hola Rubio MD   Primary Care Provider: Kalie Neely MD Face-to-Face encounter date: 05/12/2020   Chief Complaint: Shortness of Breath (Onset today)    Assessment & Plan:   Griselda Neely is a 74 y.o. female admitted for    1. ESRD HDD Non compliance   2. Calciphylaxis  3. Chronic Opoid dependence with side effects  4. Acute on chronic CHF  5.    Plan  Back to baseline  Does not want hospice  Wants to continue dialysis and discharge to [facility]  Consulted  for LTAC    Discussed her case with sisters      Discharge Planning:   Discharge to LTAC when available.     Subjective:    Interval History: Patient was seen in her room today. Completely back to baseline. More awake, alert and oriented. No concerns. Wants to go to [facility] for wound care and long term dialysis. Discussed case with wound care. Wound healing well.     Review of Systems All other Review of Systems were found to be negative expect for that mentioned already in HPI.   Objective:   Physical Exam  /81 (BP Location: Left arm, Patient Position: Lying)   Pulse 105   Temp 97.3 °F (36.3 °C) (Axillary)   Resp 19   Ht 5' 5" (1.651 m)   Wt 65 kg (143 lb 4.8 oz)   LMP  (LMP Unknown)   SpO2 98%   Breastfeeding? No   BMI 23.85 kg/m²   Vitals reviewed.    Constitutional: alert  HENT: Atraumatic.   Cardiovascular: Normal rate, regular rhythm and normal heart sounds.   Pulmonary/Chest: Coarse breath sounds bilaterally.   Abdominal: Soft. Bowel sounds are normal. Exhibits no distension and no mass. No tenderness  Neurological: alert  Skin: Skin is warm and dry.     Following labs were Reviewed   Recent Labs   Lab 05/12/20  0525   WBC 5.90   HGB 7.2*   HCT 23.8*      CALCIUM 8.0*   ALBUMIN 2.6*   PROT 7.0      K " 4.0   CO2 26      BUN 28*   CREATININE 3.5*   ALKPHOS 105   ALT 11   AST 18   BILITOT 0.8     No results found for: POCTGLUCOSE     All labs within the past 24 hours have been reviewed  Microbiology Results (last 7 days)     ** No results found for the last 168 hours. **        X-Ray Chest AP Portable   Final Result          Inpatient medications  Scheduled Meds:   sodium chloride 0.9%   Intravenous Once    aspirin  81 mg Oral Daily    calcium acetate(phosphat bind)  667 mg Oral Daily    chlorhexidine  15 mL Mouth/Throat BID    isosorbide mononitrate  30 mg Oral Daily    magnesium oxide  400 mg Oral Daily    metoprolol succinate  50 mg Oral Daily    sodium thiosulfate  12.5 g Intravenous Every Mon, Wed, Fri     Continuous Infusions:    PRN Meds:.sodium chloride 0.9%, acetaminophen, dextrose 50%, dextrose 50%, glucagon (human recombinant), glucose, glucose, morphine, oxyCODONE-acetaminophen, sodium chloride 0.9%    Above encounter included review of the medical records, interviewing and examining the patient face-to-face, discussion with family and other health care providers, ordering and interpreting lab/test results and formulating a plan of care.     Medical Decision Making:    [x] Low Complexity  [] Moderate Complexity  [] High Complexity    Hola Rubio  [facility] Hospitalist  05/12/2020

## 2020-05-13 NOTE — NURSING
[facility] requesting that patients IV's remain in place,spoke to Dr Joanne holley for patient to discharge to [facility] with IV's in place.

## 2020-05-13 NOTE — NURSING
Spoke with Nettie, emergency calls are high at this time, ETA is approximately 1-2 hours at this time.

## 2020-05-14 ENCOUNTER — TELEPHONE (OUTPATIENT)
Dept: FAMILY MEDICINE | Facility: CLINIC | Age: 75
End: 2020-05-14

## 2020-05-14 NOTE — TELEPHONE ENCOUNTER
----- Message from Beba Mario sent at 5/12/2020  3:21 PM CDT -----  Contact: NO SHOW  PT WAS A NO SHOW HOSP F/U .

## 2020-05-14 NOTE — TELEPHONE ENCOUNTER
Spoke with patients sister michell, states pt is in SNF at this time.  Will call for visit when pt returns home -DN

## 2020-05-21 ENCOUNTER — TELEPHONE (OUTPATIENT)
Dept: FAMILY MEDICINE | Facility: CLINIC | Age: 75
End: 2020-05-21

## 2020-05-21 NOTE — TELEPHONE ENCOUNTER
----- Message from Eliceo Jasmine sent at 5/18/2020  2:15 PM CDT -----  Pt is needing a f.u appt from [facility] hosp.   Pt sister [phone]

## 2020-06-04 ENCOUNTER — HOSPITAL ENCOUNTER (OUTPATIENT)
Facility: HOSPITAL | Age: 75
Discharge: HOME | DRG: 640 | End: 2020-06-05
Attending: EMERGENCY MEDICINE | Admitting: INTERNAL MEDICINE
Payer: MEDICARE

## 2020-06-04 DIAGNOSIS — R06.02 SOB (SHORTNESS OF BREATH): Primary | ICD-10-CM

## 2020-06-04 DIAGNOSIS — N18.6 ESRD (END STAGE RENAL DISEASE): Chronic | ICD-10-CM

## 2020-06-04 DIAGNOSIS — R06.02 SHORTNESS OF BREATH: ICD-10-CM

## 2020-06-04 DIAGNOSIS — E87.70 HYPERVOLEMIA, UNSPECIFIED HYPERVOLEMIA TYPE: ICD-10-CM

## 2020-06-04 DIAGNOSIS — J96.00 ACUTE RESPIRATORY FAILURE: ICD-10-CM

## 2020-06-04 DIAGNOSIS — J96.20 ACUTE ON CHRONIC RESPIRATORY FAILURE: ICD-10-CM

## 2020-06-04 PROCEDURE — 94640 AIRWAY INHALATION TREATMENT: CPT

## 2020-06-04 PROCEDURE — 99285 EMERGENCY DEPT VISIT HI MDM: CPT | Mod: 25

## 2020-06-04 PROCEDURE — 93005 ELECTROCARDIOGRAM TRACING: CPT | Performed by: INTERNAL MEDICINE

## 2020-06-04 PROCEDURE — 25000242 PHARM REV CODE 250 ALT 637 W/ HCPCS: Performed by: STUDENT IN AN ORGANIZED HEALTH CARE EDUCATION/TRAINING PROGRAM

## 2020-06-04 PROCEDURE — 94761 N-INVAS EAR/PLS OXIMETRY MLT: CPT

## 2020-06-04 RX ORDER — METOPROLOL SUCCINATE 50 MG/1
50 TABLET, EXTENDED RELEASE ORAL ONCE
Status: DISCONTINUED | OUTPATIENT
Start: 2020-06-05 | End: 2020-06-05 | Stop reason: HOSPADM

## 2020-06-04 RX ORDER — METOPROLOL TARTRATE 1 MG/ML
5 INJECTION, SOLUTION INTRAVENOUS
Status: COMPLETED | OUTPATIENT
Start: 2020-06-04 | End: 2020-06-05

## 2020-06-04 RX ORDER — DILTIAZEM HYDROCHLORIDE 180 MG/1
180 CAPSULE, COATED, EXTENDED RELEASE ORAL
Status: DISPENSED | OUTPATIENT
Start: 2020-06-04 | End: 2020-06-05

## 2020-06-04 RX ORDER — METOPROLOL SUCCINATE 25 MG/1
50 TABLET, EXTENDED RELEASE ORAL DAILY
Status: DISCONTINUED | OUTPATIENT
Start: 2020-06-05 | End: 2020-06-04

## 2020-06-04 RX ORDER — PREDNISONE 20 MG/1
40 TABLET ORAL
Status: COMPLETED | OUTPATIENT
Start: 2020-06-04 | End: 2020-06-05

## 2020-06-04 RX ORDER — IPRATROPIUM BROMIDE AND ALBUTEROL SULFATE 2.5; .5 MG/3ML; MG/3ML
3 SOLUTION RESPIRATORY (INHALATION)
Status: COMPLETED | OUTPATIENT
Start: 2020-06-04 | End: 2020-06-04

## 2020-06-04 RX ORDER — METOPROLOL TARTRATE 25 MG/1
25 TABLET, FILM COATED ORAL 2 TIMES DAILY
Status: ON HOLD | COMMUNITY
End: 2020-06-05 | Stop reason: HOSPADM

## 2020-06-04 RX ADMIN — IPRATROPIUM BROMIDE AND ALBUTEROL SULFATE 3 ML: .5; 3 SOLUTION RESPIRATORY (INHALATION) at 11:06

## 2020-06-05 VITALS
HEIGHT: 65 IN | RESPIRATION RATE: 18 BRPM | BODY MASS INDEX: 22.48 KG/M2 | TEMPERATURE: 98 F | DIASTOLIC BLOOD PRESSURE: 81 MMHG | WEIGHT: 134.94 LBS | OXYGEN SATURATION: 99 % | HEART RATE: 68 BPM | SYSTOLIC BLOOD PRESSURE: 164 MMHG

## 2020-06-05 PROBLEM — J96.20 ACUTE ON CHRONIC RESPIRATORY FAILURE: Status: ACTIVE | Noted: 2020-06-05

## 2020-06-05 PROBLEM — J96.00 ACUTE RESPIRATORY FAILURE: Status: ACTIVE | Noted: 2020-06-05

## 2020-06-05 PROBLEM — E87.70 VOLUME OVERLOAD: Status: ACTIVE | Noted: 2020-06-05

## 2020-06-05 PROBLEM — E83.59 CALCIPHYLAXIS: Status: ACTIVE | Noted: 2020-06-05

## 2020-06-05 PROBLEM — G93.49 ENCEPHALOPATHY CHRONIC: Status: ACTIVE | Noted: 2020-06-05

## 2020-06-05 LAB
ALBUMIN SERPL BCP-MCNC: 3.3 G/DL (ref 3.5–5.2)
ALBUMIN SERPL BCP-MCNC: 3.4 G/DL (ref 3.5–5.2)
ALP SERPL-CCNC: 98 U/L (ref 55–135)
ALP SERPL-CCNC: 99 U/L (ref 55–135)
ALT SERPL W/O P-5'-P-CCNC: 20 U/L (ref 10–44)
ALT SERPL W/O P-5'-P-CCNC: 21 U/L (ref 10–44)
ANION GAP SERPL CALC-SCNC: 12 MMOL/L (ref 8–16)
ANION GAP SERPL CALC-SCNC: 13 MMOL/L (ref 8–16)
AST SERPL-CCNC: 26 U/L (ref 10–40)
AST SERPL-CCNC: 31 U/L (ref 10–40)
BASOPHILS # BLD AUTO: 0.01 K/UL (ref 0–0.2)
BASOPHILS # BLD AUTO: 0.03 K/UL (ref 0–0.2)
BASOPHILS NFR BLD: 0.2 % (ref 0–1.9)
BASOPHILS NFR BLD: 0.4 % (ref 0–1.9)
BILIRUB SERPL-MCNC: 1 MG/DL (ref 0.1–1)
BILIRUB SERPL-MCNC: 1 MG/DL (ref 0.1–1)
BNP SERPL-MCNC: >4500 PG/ML (ref 0–99)
BUN SERPL-MCNC: 34 MG/DL (ref 8–23)
BUN SERPL-MCNC: 38 MG/DL (ref 8–23)
CALCIUM SERPL-MCNC: 8.3 MG/DL (ref 8.7–10.5)
CALCIUM SERPL-MCNC: 8.4 MG/DL (ref 8.7–10.5)
CHLORIDE SERPL-SCNC: 94 MMOL/L (ref 95–110)
CHLORIDE SERPL-SCNC: 96 MMOL/L (ref 95–110)
CO2 SERPL-SCNC: 27 MMOL/L (ref 23–29)
CO2 SERPL-SCNC: 30 MMOL/L (ref 23–29)
CREAT SERPL-MCNC: 4.8 MG/DL (ref 0.5–1.4)
CREAT SERPL-MCNC: 5.1 MG/DL (ref 0.5–1.4)
DIFFERENTIAL METHOD: ABNORMAL
DIFFERENTIAL METHOD: ABNORMAL
EOSINOPHIL # BLD AUTO: 0 K/UL (ref 0–0.5)
EOSINOPHIL # BLD AUTO: 0.1 K/UL (ref 0–0.5)
EOSINOPHIL NFR BLD: 0.2 % (ref 0–8)
EOSINOPHIL NFR BLD: 1.6 % (ref 0–8)
ERYTHROCYTE [DISTWIDTH] IN BLOOD BY AUTOMATED COUNT: 18.6 % (ref 11.5–14.5)
ERYTHROCYTE [DISTWIDTH] IN BLOOD BY AUTOMATED COUNT: 18.9 % (ref 11.5–14.5)
EST. GFR  (AFRICAN AMERICAN): 9 ML/MIN/1.73 M^2
EST. GFR  (AFRICAN AMERICAN): 9.7 ML/MIN/1.73 M^2
EST. GFR  (NON AFRICAN AMERICAN): 7.8 ML/MIN/1.73 M^2
EST. GFR  (NON AFRICAN AMERICAN): 8.4 ML/MIN/1.73 M^2
GLUCOSE SERPL-MCNC: 121 MG/DL (ref 70–110)
GLUCOSE SERPL-MCNC: 86 MG/DL (ref 70–110)
HCT VFR BLD AUTO: 25 % (ref 37–48.5)
HCT VFR BLD AUTO: 27.4 % (ref 37–48.5)
HGB BLD-MCNC: 7.3 G/DL (ref 12–16)
HGB BLD-MCNC: 8.2 G/DL (ref 12–16)
IMM GRANULOCYTES # BLD AUTO: 0.01 K/UL (ref 0–0.04)
IMM GRANULOCYTES # BLD AUTO: 0.03 K/UL (ref 0–0.04)
IMM GRANULOCYTES NFR BLD AUTO: 0.2 % (ref 0–0.5)
IMM GRANULOCYTES NFR BLD AUTO: 0.4 % (ref 0–0.5)
INR PPP: 1.6
LYMPHOCYTES # BLD AUTO: 0.4 K/UL (ref 1–4.8)
LYMPHOCYTES # BLD AUTO: 1.2 K/UL (ref 1–4.8)
LYMPHOCYTES NFR BLD: 15.8 % (ref 18–48)
LYMPHOCYTES NFR BLD: 6.2 % (ref 18–48)
MAGNESIUM SERPL-MCNC: 2.3 MG/DL (ref 1.6–2.6)
MCH RBC QN AUTO: 27.8 PG (ref 27–31)
MCH RBC QN AUTO: 27.8 PG (ref 27–31)
MCHC RBC AUTO-ENTMCNC: 29.2 G/DL (ref 32–36)
MCHC RBC AUTO-ENTMCNC: 29.9 G/DL (ref 32–36)
MCV RBC AUTO: 93 FL (ref 82–98)
MCV RBC AUTO: 95 FL (ref 82–98)
MONOCYTES # BLD AUTO: 0.1 K/UL (ref 0.3–1)
MONOCYTES # BLD AUTO: 0.6 K/UL (ref 0.3–1)
MONOCYTES NFR BLD: 2 % (ref 4–15)
MONOCYTES NFR BLD: 8.6 % (ref 4–15)
NEUTROPHILS # BLD AUTO: 5.4 K/UL (ref 1.8–7.7)
NEUTROPHILS # BLD AUTO: 5.6 K/UL (ref 1.8–7.7)
NEUTROPHILS NFR BLD: 73.2 % (ref 38–73)
NEUTROPHILS NFR BLD: 91.2 % (ref 38–73)
NRBC BLD-RTO: 0 /100 WBC
NRBC BLD-RTO: 0 /100 WBC
PLATELET # BLD AUTO: 228 K/UL (ref 150–350)
PLATELET # BLD AUTO: 274 K/UL (ref 150–350)
PMV BLD AUTO: 10.6 FL (ref 9.2–12.9)
PMV BLD AUTO: 11.1 FL (ref 9.2–12.9)
POTASSIUM SERPL-SCNC: 4.4 MMOL/L (ref 3.5–5.1)
POTASSIUM SERPL-SCNC: 5.2 MMOL/L (ref 3.5–5.1)
PROT SERPL-MCNC: 7.9 G/DL (ref 6–8.4)
PROT SERPL-MCNC: 8.1 G/DL (ref 6–8.4)
PROTHROMBIN TIME: 18.2 SEC (ref 10.6–14.8)
RBC # BLD AUTO: 2.63 M/UL (ref 4–5.4)
RBC # BLD AUTO: 2.95 M/UL (ref 4–5.4)
SARS-COV-2 RDRP RESP QL NAA+PROBE: NEGATIVE
SODIUM SERPL-SCNC: 135 MMOL/L (ref 136–145)
SODIUM SERPL-SCNC: 137 MMOL/L (ref 136–145)
TROPONIN I SERPL DL<=0.01 NG/ML-MCNC: 0.04 NG/ML
WBC # BLD AUTO: 6.11 K/UL (ref 3.9–12.7)
WBC # BLD AUTO: 7.36 K/UL (ref 3.9–12.7)

## 2020-06-05 PROCEDURE — 96374 THER/PROPH/DIAG INJ IV PUSH: CPT

## 2020-06-05 PROCEDURE — 80053 COMPREHEN METABOLIC PANEL: CPT | Mod: 91

## 2020-06-05 PROCEDURE — 63600175 PHARM REV CODE 636 W HCPCS: Performed by: STUDENT IN AN ORGANIZED HEALTH CARE EDUCATION/TRAINING PROGRAM

## 2020-06-05 PROCEDURE — 94761 N-INVAS EAR/PLS OXIMETRY MLT: CPT

## 2020-06-05 PROCEDURE — G0378 HOSPITAL OBSERVATION PER HR: HCPCS

## 2020-06-05 PROCEDURE — 27000221 HC OXYGEN, UP TO 24 HOURS

## 2020-06-05 PROCEDURE — 90935 HEMODIALYSIS ONE EVALUATION: CPT

## 2020-06-05 PROCEDURE — 99900035 HC TECH TIME PER 15 MIN (STAT)

## 2020-06-05 PROCEDURE — 83735 ASSAY OF MAGNESIUM: CPT

## 2020-06-05 PROCEDURE — U0002 COVID-19 LAB TEST NON-CDC: HCPCS

## 2020-06-05 PROCEDURE — 85610 PROTHROMBIN TIME: CPT

## 2020-06-05 PROCEDURE — 80053 COMPREHEN METABOLIC PANEL: CPT

## 2020-06-05 PROCEDURE — 25000003 PHARM REV CODE 250: Performed by: INTERNAL MEDICINE

## 2020-06-05 PROCEDURE — 83880 ASSAY OF NATRIURETIC PEPTIDE: CPT

## 2020-06-05 PROCEDURE — 36415 COLL VENOUS BLD VENIPUNCTURE: CPT

## 2020-06-05 PROCEDURE — 21000000 HC CCU ICU ROOM CHARGE

## 2020-06-05 PROCEDURE — 84484 ASSAY OF TROPONIN QUANT: CPT

## 2020-06-05 PROCEDURE — 85025 COMPLETE CBC W/AUTO DIFF WBC: CPT

## 2020-06-05 PROCEDURE — 94660 CPAP INITIATION&MGMT: CPT

## 2020-06-05 PROCEDURE — 25000003 PHARM REV CODE 250: Performed by: STUDENT IN AN ORGANIZED HEALTH CARE EDUCATION/TRAINING PROGRAM

## 2020-06-05 RX ORDER — DILTIAZEM HYDROCHLORIDE 180 MG/1
180 CAPSULE, COATED, EXTENDED RELEASE ORAL DAILY
Status: DISCONTINUED | OUTPATIENT
Start: 2020-06-05 | End: 2020-06-05 | Stop reason: HOSPADM

## 2020-06-05 RX ORDER — LOSARTAN POTASSIUM 25 MG/1
25 TABLET ORAL DAILY
Status: DISCONTINUED | OUTPATIENT
Start: 2020-06-05 | End: 2020-06-05 | Stop reason: HOSPADM

## 2020-06-05 RX ORDER — ASPIRIN 81 MG/1
81 TABLET ORAL DAILY
Status: DISCONTINUED | OUTPATIENT
Start: 2020-06-05 | End: 2020-06-05 | Stop reason: HOSPADM

## 2020-06-05 RX ORDER — ACETAMINOPHEN 325 MG/1
650 TABLET ORAL EVERY 4 HOURS PRN
Status: DISCONTINUED | OUTPATIENT
Start: 2020-06-05 | End: 2020-06-05 | Stop reason: HOSPADM

## 2020-06-05 RX ORDER — WARFARIN 1 MG/1
2 TABLET ORAL DAILY
Status: DISCONTINUED | OUTPATIENT
Start: 2020-06-05 | End: 2020-06-05 | Stop reason: HOSPADM

## 2020-06-05 RX ORDER — OXYCODONE AND ACETAMINOPHEN 5; 325 MG/1; MG/1
1 TABLET ORAL EVERY 8 HOURS PRN
Status: DISCONTINUED | OUTPATIENT
Start: 2020-06-05 | End: 2020-06-05 | Stop reason: HOSPADM

## 2020-06-05 RX ORDER — ONDANSETRON 2 MG/ML
4 INJECTION INTRAMUSCULAR; INTRAVENOUS EVERY 6 HOURS PRN
Status: DISCONTINUED | OUTPATIENT
Start: 2020-06-05 | End: 2020-06-05 | Stop reason: HOSPADM

## 2020-06-05 RX ORDER — CALCIUM ACETATE 667 MG/1
667 CAPSULE ORAL DAILY
Status: DISCONTINUED | OUTPATIENT
Start: 2020-06-05 | End: 2020-06-05 | Stop reason: HOSPADM

## 2020-06-05 RX ORDER — METOPROLOL TARTRATE 25 MG/1
25 TABLET, FILM COATED ORAL 2 TIMES DAILY
Status: DISCONTINUED | OUTPATIENT
Start: 2020-06-05 | End: 2020-06-05 | Stop reason: HOSPADM

## 2020-06-05 RX ORDER — METOPROLOL TARTRATE 1 MG/ML
5 INJECTION, SOLUTION INTRAVENOUS EVERY 5 MIN PRN
Status: DISCONTINUED | OUTPATIENT
Start: 2020-06-05 | End: 2020-06-05 | Stop reason: HOSPADM

## 2020-06-05 RX ADMIN — METOPROLOL TARTRATE 5 MG: 1 INJECTION, SOLUTION INTRAVENOUS at 12:06

## 2020-06-05 RX ADMIN — ACETAMINOPHEN 650 MG: 325 TABLET ORAL at 08:06

## 2020-06-05 RX ADMIN — CALCIUM ACETATE 667 MG: 667 CAPSULE ORAL at 08:06

## 2020-06-05 RX ADMIN — OXYCODONE HYDROCHLORIDE AND ACETAMINOPHEN 1 TABLET: 5; 325 TABLET ORAL at 10:06

## 2020-06-05 RX ADMIN — DILTIAZEM HYDROCHLORIDE 180 MG: 180 CAPSULE, COATED, EXTENDED RELEASE ORAL at 08:06

## 2020-06-05 RX ADMIN — METOPROLOL TARTRATE 25 MG: 25 TABLET, FILM COATED ORAL at 08:06

## 2020-06-05 RX ADMIN — PREDNISONE 40 MG: 20 TABLET ORAL at 12:06

## 2020-06-05 RX ADMIN — LOSARTAN POTASSIUM 25 MG: 25 TABLET, FILM COATED ORAL at 08:06

## 2020-06-05 RX ADMIN — ASPIRIN 81 MG: 81 TABLET, DELAYED RELEASE ORAL at 08:06

## 2020-06-05 NOTE — ED NOTES
At pt's bedside, states she needs to have BM, does not want bedside commode. Dicussed that bipap cannot go to restroom and pt likely cannot walk that far. Pt upset about bedside commode but ultimately used it. Assisted back into bed, then pt stated needed to go again. Assisted back onto bedside commode. Assisted back into bed. Pt requests bipap be taken off but reinforced need for bipap as she continuously complains of SOB without it.

## 2020-06-05 NOTE — ASSESSMENT & PLAN NOTE
Chronic stable issue  3 months ago Left arterial doppler  identified no flow within posterior tibial, peroneal, or dorsalis pedis arteries consistent with occlusion   Following pt had Aortogram which showed severely calcified aorto-iliac system

## 2020-06-05 NOTE — ASSESSMENT & PLAN NOTE
DNR/DNI  Should be under hospice care  Discussed about hospice care before and pt agreed to it initially . Later she changed her mind

## 2020-06-05 NOTE — ASSESSMENT & PLAN NOTE
Patient getting admitted with Acute Resp Failure due to fluid overload from ESRD  She also has severe Systolic HF with EF of 25% ,Grade 4 (severe) Diastolic dysfunction with Pulmonary HTN

## 2020-06-05 NOTE — ED NOTES
"IV attempt but pt moved arm then told RN "Don't dig in my arm" stating she wanted me to stop trying for IV. She is arguing about medications and has refused so far.  "

## 2020-06-05 NOTE — DISCHARGE SUMMARY
[facility] Medicine  Discharge Summary      Patient Name: Griselda Neely  MRN: [id number]  Admission Date: 6/4/2020  Hospital Length of Stay: 0 days  Discharge Date and Time:  06/05/2020 4:44 PM  Attending Physician: Parker Gaytan MD   Discharging Provider: Parker Gaytan MD  Primary Care Provider: Kalie Neely MD      HPI:   74 year old patient admitted with Acute on Chronic respiratory failure  Patient is an ESRD Pt who receives HD on MWF  She had Dialysis on this Wednesday but felt that enough fluid was not removed  She came to ER yesterday with SOB but Left AMA  Per Pt she Loves eating watermelons   Today her symptoms went worse and she came to ER and Got admitted   She denies any other issues . Currently she is now on BiPAP when seen in ER     * No surgery found *      Hospital Course:   Patient was admitted with pulmonary edema with volume overload in and states renal disease.  Patient was dialyzed later and symptoms improved and discharged in stable condition.  She was arranged for another dialysis on Monday by nephrology.     Consults:   Consults (From admission, onward)        Status Ordering Provider     Inpatient consult to Cardiology  Once     Provider:  Katharina Powell MD    Acknowledged PARKER GAYTAN     Inpatient consult to Hospitalist  Once     Provider:  Franky Maria MD    Acknowledged FRANKY MARIA     Inpatient consult to Nephrology  Once     Provider:  Joseph Ingram MD    Completed FRANKY MARIA          No new Assessment & Plan notes have been filed under this hospital service since the last note was generated.  Service: Hospital Medicine    Final Active Diagnoses:    Diagnosis Date Noted POA    PRINCIPAL PROBLEM:  Acute on chronic respiratory failure [J96.20] 06/05/2020 Yes    ESRD (end stage renal disease) on HD M,W, F [N18.6] 05/11/2016 Yes     Chronic    Volume overload [E87.70] 06/05/2020 Yes    Calciphylaxis [E83.59] 06/05/2020 Yes    Encephalopathy  chronic [G93.49] 06/05/2020 Unknown    Atrial fibrillation with RVR [I48.91] 05/09/2020 Yes    H/O mitral valve replacement [Z95.2] 03/17/2020 Not Applicable     Chronic    Chronic pain on chronic narcotics [R52] 03/17/2020 Yes     Chronic    Left atrial thrombus [I51.3] 01/03/2020 Yes    PVD (peripheral vascular disease) [I73.9] 12/20/2019 Yes     Chronic    DNR (do not resuscitate) [Z66] 09/30/2019 Yes     Chronic    Chronic respiratory failure [J96.10] 05/13/2019 Yes     Chronic    HTN (hypertension) [I10] 08/15/2013 Yes     Chronic      Problems Resolved During this Admission:       Discharged Condition: good    Disposition: Home or Self Care    Follow Up:  Follow-up Information     Joseph Ingram MD.    Specialty:  Nephrology  Contact information:  [address] [facility] [address]  [phone]             Katharina Powell MD In 2 weeks.    Specialties:  Cardiovascular Disease, Cardiology, INTERVENTIONAL CARDIOLOGY  Contact information:  [address]  [address]  [facility] [address]  [phone]                 Patient Instructions:      Diet renal     Notify your health care provider if you experience any of the following:  severe uncontrolled pain     Activity as tolerated       Significant Diagnostic Studies: Labs:   CMP   Recent Labs   Lab 06/03/20 1857 06/05/20  0030 06/05/20  0612    137 135*   K 4.4 4.4 5.2*   CL 94* 94* 96   CO2 32* 30* 27   GLU 82 86 121*   BUN 19 34* 38*   CREATININE 3.5* 4.8* 5.1*   CALCIUM 8.6* 8.4* 8.3*   PROT 7.7 8.1 7.9   ALBUMIN 3.0* 3.4* 3.3*   BILITOT 0.8 1.0 1.0   ALKPHOS 94 99 98   AST 28 26 31   ALT 20 20 21   ANIONGAP 10 13 12   ESTGFRAFRICA 14.2* 9.7* 9.0*   EGFRNONAA 12.3* 8.4* 7.8*   , CBC   Recent Labs   Lab 06/03/20 1857 06/05/20  0030 06/05/20  0612   WBC 5.34 7.36 6.11   HGB 8.6* 7.3* 8.2*   HCT 28.4* 25.0* 27.4*    274 228   , INR   Lab Results   Component Value Date    INR 1.6 06/05/2020    INR 1.6 06/03/2020    INR 1.7  05/09/2020    and Lipid Panel   Lab Results   Component Value Date    CHOL 155 10/19/2019    HDL 56 10/19/2019    LDLCALC 82.6 10/19/2019    TRIG 82 10/19/2019    CHOLHDL 36.1 10/19/2019       Pending Diagnostic Studies:     None         Medications:  Reconciled Home Medications:      Medication List      CHANGE how you take these medications    mupirocin 2 % ointment  Commonly known as:  BACTROBAN  Apply topically 3 (three) times daily.  What changed:  how much to take     warfarin 1 MG tablet  Commonly known as:  COUMADIN  Take 2 tablets (2 mg total) by mouth Daily.  What changed:  when to take this        CONTINUE taking these medications    aspirin 81 MG EC tablet  Commonly known as:  ECOTRIN  Take 81 mg by mouth once daily.     calcium acetate(phosphat bind) 667 mg capsule  Commonly known as:  PHOSLO  Take 667 mg by mouth once daily.     diltiaZEM 180 MG 24 hr capsule  Commonly known as:  CARDIZEM CD  Take 180 mg by mouth once daily.     isosorbide mononitrate 30 MG 24 hr tablet  Commonly known as:  IMDUR  Take 30 mg by mouth once daily.     losartan 25 MG tablet  Commonly known as:  COZAAR  Take 25 mg by mouth once daily.     magnesium oxide 400 mg (241.3 mg magnesium) tablet  Commonly known as:  MAG-OX  Take 400 mg by mouth once daily.     metoprolol succinate 50 MG 24 hr tablet  Commonly known as:  TOPROL-XL  Take 1 tablet (50 mg total) by mouth once daily.     oxyCODONE-acetaminophen  mg per tablet  Commonly known as:  PERCOCET  Take 1 tablet by mouth every 4 (four) hours as needed for Pain.     JENNIFER-KODAK ORAL  Take 1 tablet by mouth once daily.        STOP taking these medications    metoprolol tartrate 25 MG tablet  Commonly known as:  LOPRESSOR            Indwelling Lines/Drains at time of discharge:   Lines/Drains/Airways     Drain                 Hemodialysis AV Fistula Right upper arm -- days         Hemodialysis AV Fistula 08/08/19 0214 Right upper arm 302 days              Physical  Exam:  General- Patient alert and oriented x3 in NAD  HEENT- PERRLA,  Neck- No JVD, Lymphadenopathy, Thyromegaly  CV- Regular rate and rhythm, No Murmur/ingrid/rubs  Resp- Lungs CTA Bilaterally, No increased WOB  GI- Non tender/non-distended,   Extrem- No cyanosis, clubbing, edema.  Neuro- normal     Time spent on the discharge of patient: 23 minutes  Patient was seen and examined on the date of discharge and determined to be suitable for discharge.         David Gaytan MD  Department of Hospital Medicine  [facility]

## 2020-06-05 NOTE — PROGRESS NOTES
Net uf 3000 mls     06/05/20 1635   Post-Hemodialysis Assessment   Rinseback Volume (mL) 250 mL   Blood Volume Processed (Liters) 64.4 L   Dialyzer Clearance Lightly streaked   Duration of Treatment (minutes) 180 minutes   Hemodialysis Intake (mL) 500 mL   Total UF (mL) 3500 mL   Net Fluid Removal 3000   Patient Response to Treatment tolerated well   Post-Treatment Weight 58.2 kg (128 lb 4.9 oz)   Treatment Weight Change -3   Arterial bleeding stop time (min) 5 min   Venous bleeding stop time (min) 5 min   Post-Hemodialysis Comments treatment complete,pt stable. lines reinfused, needles removed. hemostasis achieved. thrill and bruit present post treatment.

## 2020-06-05 NOTE — H&P
[facility] Medicine  History & Physical    Patient Name: Griselda Neely  MRN: [id number]  Admission Date: 6/4/2020  Attending Physician: Franky Maria MD   Primary Care Provider: Kalie Neely MD         Patient information was obtained from patient and ER records.     Subjective:     Principal Problem:Acute on chronic respiratory failure    Chief Complaint:   Chief Complaint   Patient presents with    Shortness of Breath        HPI: 74 year old patient admitted with Acute on Chronic respiratory failure  Patient is an ESRD Pt who receives HD on MWF  She had Dialysis on this Wednesday but felt that enough fluid was not removed  She came to ER yesterday with SOB but Left AMA  Per Pt she Loves eating watermelons   Today her symptoms went worse and she came to ER and Got admitted   She denies any other issues . Currently she is now on BiPAP when seen in ER     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    both legs    CHF (congestive heart failure)     Encounter for blood transfusion 03/2016    Gout     Hypertension     Mitral valve regurgitation     Osteoarthritis     Pancreatitis     Peripheral vascular disease     Peritoneal dialysis catheter in place     Pneumonia 09/09/2017    Renal failure        Past Surgical History:   Procedure Laterality Date    ACHILLES TENDON SURGERY Right     ANGIOGRAPHY OF ARTERIOVENOUS SHUNT Right 1/7/2020    Procedure: Fistulogram with Possible Intervention;  Surgeon: Joseph Loyola MD;  Location: [facility] CATH/EP LAB;  Service: Cardiology;  Laterality: Right;    ANGIOGRAPHY OF LOWER EXTREMITY Left 3/18/2020    Procedure: Angiogram Extremity Unilateral;  Surgeon: Ali Khoobehi, MD;  Location: [facility] CATH/EP LAB;  Service: Cardiology;  Laterality: Left;    APPENDECTOMY      CARDIAC CATHETERIZATION  07/03/2017    CHOLECYSTECTOMY      heal surgery Right     HYSTERECTOMY      INSERTION OF STENT INTO PERIPHERAL  VESSEL N/A 1/7/2020    Procedure: INSERTION, STENT, VESSEL, PERIPHERAL;  Surgeon: Joseph Loyola MD;  Location: [facility] CATH/EP LAB;  Service: Cardiology;  Laterality: N/A;    PARATHYROIDECTOMY      PARATHYROIDECTOMY  07/13/2017    PERCUTANEOUS TRANSLUMINAL ANGIOPLASTY OF ARTERIOVENOUS FISTULA N/A 1/7/2020    Procedure: PTA, AV FISTULA;  Surgeon: Joseph Loyola MD;  Location: [facility] CATH/EP LAB;  Service: Cardiology;  Laterality: N/A;    PERITONEAL CATHETER INSERTION      RENAL BIOPSY      vocal cord nodule         Review of patient's allergies indicates:  No Known Allergies    No current facility-administered medications on file prior to encounter.      Current Outpatient Medications on File Prior to Encounter   Medication Sig    calcium acetate (PHOSLO) 667 mg capsule Take 667 mg by mouth once daily.     folic acid/vit B complex and C (JENNIFER-KODAK ORAL) Take 1 tablet by mouth once daily.    isosorbide mononitrate (IMDUR) 30 MG 24 hr tablet Take 30 mg by mouth once daily.    losartan (COZAAR) 25 MG tablet Take 25 mg by mouth once daily.    magnesium oxide (MAG-OX) 400 mg (241.3 mg magnesium) tablet Take 400 mg by mouth once daily.    metoprolol tartrate (LOPRESSOR) 25 MG tablet Take 25 mg by mouth 2 (two) times daily.    warfarin (COUMADIN) 1 MG tablet Take 2 tablets (2 mg total) by mouth Daily. (Patient taking differently: Take 2 mg by mouth every evening. )    aspirin (ECOTRIN) 81 MG EC tablet Take 81 mg by mouth once daily.    diltiaZEM (CARDIZEM CD) 180 MG 24 hr capsule Take 180 mg by mouth once daily.    metoprolol succinate (TOPROL-XL) 50 MG 24 hr tablet Take 1 tablet (50 mg total) by mouth once daily. (Patient taking differently: Take 50 mg by mouth once daily. )    mupirocin (BACTROBAN) 2 % ointment Apply topically 3 (three) times daily. (Patient taking differently: Apply 1 g topically 3 (three) times daily. )    oxyCODONE-acetaminophen (PERCOCET)  mg per tablet Take 1 tablet by mouth  every 4 (four) hours as needed for Pain.     Family History     Problem Relation (Age of Onset)    Arthritis Mother    Diabetes Mother, Father    Early death Sister, Sister    Heart disease Sister, Maternal Grandfather, Mother    Heart failure Mother    Hypertension Mother        Tobacco Use    Smoking status: Current Some Day Smoker     Packs/day: 0.50     Years: 45.00     Pack years: 22.50     Types: Cigarettes    Smokeless tobacco: Never Used   Substance and Sexual Activity    Alcohol use: No    Drug use: No    Sexual activity: Not on file     Review of Systems   Constitutional: Negative for activity change and appetite change.   HENT: Negative for congestion and dental problem.    Eyes: Negative for discharge and itching.   Respiratory: Positive for shortness of breath.    Cardiovascular: Negative for chest pain.   Gastrointestinal: Negative for abdominal distention and abdominal pain.   Endocrine: Negative for cold intolerance.   Genitourinary: Negative for difficulty urinating and dysuria.   Musculoskeletal: Negative for arthralgias and back pain.   Skin: Negative for color change.   Neurological: Negative for dizziness and facial asymmetry.   Hematological: Negative for adenopathy.   Psychiatric/Behavioral: Negative for agitation and behavioral problems.     Objective:     Vital Signs (Most Recent):  Temp: 98.3 °F (36.8 °C) (06/04/20 2235)  Pulse: 107 (06/05/20 0109)  Resp: (!) 21 (06/05/20 0109)  BP: (!) 150/89 (06/05/20 0100)  SpO2: 100 % (06/05/20 0109) Vital Signs (24h Range):  Temp:  [98.3 °F (36.8 °C)] 98.3 °F (36.8 °C)  Pulse:  [107-120] 107  Resp:  [21-31] 21  SpO2:  [97 %-100 %] 100 %  BP: (126-150)/(80-89) 150/89     Weight: 59 kg (130 lb)  Body mass index is 21.63 kg/m².    Physical Exam   Constitutional: She appears well-nourished. No distress.   HENT:   Right Ear: External ear normal.   Left Ear: External ear normal.   Eyes: Conjunctivae are normal.   Neck: Neck supple.   Cardiovascular:  Normal rate.   Tachycardic  Irregular rhythm   Pulmonary/Chest: Effort normal.   Extensive bibasilar crackles   Abdominal: Soft. Bowel sounds are normal.   Musculoskeletal: Normal range of motion. She exhibits no edema.   calciphylaxis   Neurological: She is alert.   Skin: Skin is warm.   Psychiatric: She has a normal mood and affect.   Nursing note and vitals reviewed.          Significant Labs:   CBC:   Recent Labs   Lab 06/03/20 1857 06/05/20  0030   WBC 5.34 7.36   HGB 8.6* 7.3*   HCT 28.4* 25.0*    274     CMP:   Recent Labs   Lab 06/03/20 1857 06/05/20  0030    137   K 4.4 4.4   CL 94* 94*   CO2 32* 30*   GLU 82 86   BUN 19 34*   CREATININE 3.5* 4.8*   CALCIUM 8.6* 8.4*   PROT 7.7 8.1   ALBUMIN 3.0* 3.4*   BILITOT 0.8 1.0   ALKPHOS 94 99   AST 28 26   ALT 20 20   ANIONGAP 10 13   EGFRNONAA 12.3* 8.4*       Significant Imaging: I have reviewed and interpreted all pertinent imaging results/findings within the past 24 hours.    Assessment/Plan:     * Acute on chronic respiratory failure  Patient getting admitted with Acute Resp Failure due to fluid overload from ESRD  She also has severe Systolic HF with EF of 25% ,Grade 4 (severe) Diastolic dysfunction with Pulmonary HTN      Volume overload  In the background of ESRD      Atrial fibrillation with RVR  Normally on Betablockers/Ca Channel blockers at Home which Will be resumed  Can have iv toprol pushes PRN basis  Suffers from chronic electrolyte abnormalities and chronic end stage heart issues        DNR (do not resuscitate)  DNR/DNI  Should be under hospice care  Discussed about hospice care before and pt agreed to it initially . Later she changed her mind      Encephalopathy chronic  Normally suffers from chronic encephalopathy  She has been extensively investigated from Neurology point of view   and all the tests were normal  She is on opiates for calciphylaxis and will order this on reduced frequency  Once stable after HD session or sessions  pt should  Go home  More she stayed in hospital, her encephalopathy went worse on previous admissions    Left atrial thrombus  On Warfarin . Continue      Calciphylaxis  Opiates PRN basis      Chronic pain on chronic narcotics  Reduce the frequency of opiates      H/O mitral valve replacement  Stable issue  Bioprosthetic valve      PVD (peripheral vascular disease)  Chronic stable issue  3 months ago Left arterial doppler  identified no flow within posterior tibial, peroneal, or dorsalis pedis arteries consistent with occlusion   Following pt had Aortogram which showed severely calcified aorto-iliac system          Chronic respiratory failure  Normally on 3 L O2 at home      ESRD (end stage renal disease) on HD M,W, F  ESRD on HD sessions:MWF  Consult Nephro MD for possible HD asap now      HTN (hypertension)  Continue Home medications        VTE Risk Mitigation (From admission, onward)         Ordered     warfarin (COUMADIN) tablet 2 mg  Daily      06/05/20 0221     IP VTE LOW RISK PATIENT  Once      06/05/20 0221                   Franky Maria MD  Department of Hospital Medicine   Pending sale to [facility]

## 2020-06-05 NOTE — PLAN OF CARE
06/05/20 1000   Patient Assessment/Suction   Level of Consciousness (AVPU) alert   PRE-TX-O2   O2 Device (Oxygen Therapy) BiPAP   $ Is the patient on Low Flow Oxygen? Yes   Oxygen Concentration (%) 35   SpO2 98 %   Pulse Oximetry Type Continuous   $ Pulse Oximetry - Multiple Charge Pulse Oximetry - Multiple   Pulse 100   Resp (!) 28   Preset CPAP/BiPAP Settings   Mode Of Delivery BiPAP S/T   $ CPAP/BiPAP Daily Charge BiPAP/CPAP Daily   Size of Mask Small   Sized Appropriately? Yes   Airway Device Type small full face mask   Ipap 16   EPAP (cm H2O) 8   Pressure Support (cm H2O) 8   Set Rate (Breaths/Min) 12   ITime (sec) 0.8   Rise Time (sec) 3   Patient CPAP/BiPAP Settings   RR Total (Breaths/Min) 28   Tidal Volume (mL) 633   VE Minute Ventilation (L/min) 20 L/min   Peak Inspiratory Pressure (cm H2O) 16   TiTOT (%) 30   Total Leak (L/Min) 11   Patient Trigger - ST Mode Only (%) 98   CPAP/BiPAP Alarms   High Pressure (cm H2O) 40   Low Pressure (cm H2O) 1   Minute Ventilation (L/Min) 2   High RR (breaths/min) 50   Low RR (breaths/min) 2   Apnea (Sec) 20

## 2020-06-05 NOTE — ED NOTES
Resident at bedside; pt states she is not receiving care. He reviewed medications with pt and she now agrees to take steroid.

## 2020-06-05 NOTE — SUBJECTIVE & OBJECTIVE
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    both legs    CHF (congestive heart failure)     Encounter for blood transfusion 03/2016    Gout     Hypertension     Mitral valve regurgitation     Osteoarthritis     Pancreatitis     Peripheral vascular disease     Peritoneal dialysis catheter in place     Pneumonia 09/09/2017    Renal failure        Past Surgical History:   Procedure Laterality Date    ACHILLES TENDON SURGERY Right     ANGIOGRAPHY OF ARTERIOVENOUS SHUNT Right 1/7/2020    Procedure: Fistulogram with Possible Intervention;  Surgeon: Joseph Loyola MD;  Location: [facility] CATH/EP LAB;  Service: Cardiology;  Laterality: Right;    ANGIOGRAPHY OF LOWER EXTREMITY Left 3/18/2020    Procedure: Angiogram Extremity Unilateral;  Surgeon: Ali Khoobehi, MD;  Location: [facility] CATH/EP LAB;  Service: Cardiology;  Laterality: Left;    APPENDECTOMY      CARDIAC CATHETERIZATION  07/03/2017    CHOLECYSTECTOMY      heal surgery Right     HYSTERECTOMY      INSERTION OF STENT INTO PERIPHERAL VESSEL N/A 1/7/2020    Procedure: INSERTION, STENT, VESSEL, PERIPHERAL;  Surgeon: Joseph Loyola MD;  Location: [facility] CATH/EP LAB;  Service: Cardiology;  Laterality: N/A;    PARATHYROIDECTOMY      PARATHYROIDECTOMY  07/13/2017    PERCUTANEOUS TRANSLUMINAL ANGIOPLASTY OF ARTERIOVENOUS FISTULA N/A 1/7/2020    Procedure: PTA, AV FISTULA;  Surgeon: Joseph Loyola MD;  Location: [facility] CATH/EP LAB;  Service: Cardiology;  Laterality: N/A;    PERITONEAL CATHETER INSERTION      RENAL BIOPSY      vocal cord nodule         Review of patient's allergies indicates:  No Known Allergies    No current facility-administered medications on file prior to encounter.      Current Outpatient Medications on File Prior to Encounter   Medication Sig    calcium acetate (PHOSLO) 667 mg capsule Take 667 mg by mouth once daily.     folic acid/vit B complex and C (JENNIFER-KODAK ORAL) Take 1 tablet by mouth once  daily.    isosorbide mononitrate (IMDUR) 30 MG 24 hr tablet Take 30 mg by mouth once daily.    losartan (COZAAR) 25 MG tablet Take 25 mg by mouth once daily.    magnesium oxide (MAG-OX) 400 mg (241.3 mg magnesium) tablet Take 400 mg by mouth once daily.    metoprolol tartrate (LOPRESSOR) 25 MG tablet Take 25 mg by mouth 2 (two) times daily.    warfarin (COUMADIN) 1 MG tablet Take 2 tablets (2 mg total) by mouth Daily. (Patient taking differently: Take 2 mg by mouth every evening. )    aspirin (ECOTRIN) 81 MG EC tablet Take 81 mg by mouth once daily.    diltiaZEM (CARDIZEM CD) 180 MG 24 hr capsule Take 180 mg by mouth once daily.    metoprolol succinate (TOPROL-XL) 50 MG 24 hr tablet Take 1 tablet (50 mg total) by mouth once daily. (Patient taking differently: Take 50 mg by mouth once daily. )    mupirocin (BACTROBAN) 2 % ointment Apply topically 3 (three) times daily. (Patient taking differently: Apply 1 g topically 3 (three) times daily. )    oxyCODONE-acetaminophen (PERCOCET)  mg per tablet Take 1 tablet by mouth every 4 (four) hours as needed for Pain.     Family History     Problem Relation (Age of Onset)    Arthritis Mother    Diabetes Mother, Father    Early death Sister, Sister    Heart disease Sister, Maternal Grandfather, Mother    Heart failure Mother    Hypertension Mother        Tobacco Use    Smoking status: Current Some Day Smoker     Packs/day: 0.50     Years: 45.00     Pack years: 22.50     Types: Cigarettes    Smokeless tobacco: Never Used   Substance and Sexual Activity    Alcohol use: No    Drug use: No    Sexual activity: Not on file     Review of Systems   Constitutional: Negative for activity change and appetite change.   HENT: Negative for congestion and dental problem.    Eyes: Negative for discharge and itching.   Respiratory: Positive for shortness of breath.    Cardiovascular: Negative for chest pain.   Gastrointestinal: Negative for abdominal distention and abdominal  pain.   Endocrine: Negative for cold intolerance.   Genitourinary: Negative for difficulty urinating and dysuria.   Musculoskeletal: Negative for arthralgias and back pain.   Skin: Negative for color change.   Neurological: Negative for dizziness and facial asymmetry.   Hematological: Negative for adenopathy.   Psychiatric/Behavioral: Negative for agitation and behavioral problems.     Objective:     Vital Signs (Most Recent):  Temp: 98.3 °F (36.8 °C) (06/04/20 2235)  Pulse: 107 (06/05/20 0109)  Resp: (!) 21 (06/05/20 0109)  BP: (!) 150/89 (06/05/20 0100)  SpO2: 100 % (06/05/20 0109) Vital Signs (24h Range):  Temp:  [98.3 °F (36.8 °C)] 98.3 °F (36.8 °C)  Pulse:  [107-120] 107  Resp:  [21-31] 21  SpO2:  [97 %-100 %] 100 %  BP: (126-150)/(80-89) 150/89     Weight: 59 kg (130 lb)  Body mass index is 21.63 kg/m².    Physical Exam   Constitutional: She appears well-nourished. No distress.   HENT:   Right Ear: External ear normal.   Left Ear: External ear normal.   Eyes: Conjunctivae are normal.   Neck: Neck supple.   Cardiovascular: Normal rate.   Tachycardic  Irregular rhythm   Pulmonary/Chest: Effort normal.   Extensive bibasilar crackles   Abdominal: Soft. Bowel sounds are normal.   Musculoskeletal: Normal range of motion. She exhibits no edema.   calciphylaxis   Neurological: She is alert.   Skin: Skin is warm.   Psychiatric: She has a normal mood and affect.   Nursing note and vitals reviewed.          Significant Labs:   CBC:   Recent Labs   Lab 06/03/20 1857 06/05/20  0030   WBC 5.34 7.36   HGB 8.6* 7.3*   HCT 28.4* 25.0*    274     CMP:   Recent Labs   Lab 06/03/20 1857 06/05/20  0030    137   K 4.4 4.4   CL 94* 94*   CO2 32* 30*   GLU 82 86   BUN 19 34*   CREATININE 3.5* 4.8*   CALCIUM 8.6* 8.4*   PROT 7.7 8.1   ALBUMIN 3.0* 3.4*   BILITOT 0.8 1.0   ALKPHOS 94 99   AST 28 26   ALT 20 20   ANIONGAP 10 13   EGFRNONAA 12.3* 8.4*       Significant Imaging: I have reviewed and interpreted all pertinent  imaging results/findings within the past 24 hours.

## 2020-06-05 NOTE — CONSULTS
[facility]  Cardiology  Consult Note    Patient Name: Griselda Neely  MRN: [id number]  Admission Date: 6/4/2020  Hospital Length of Stay: 0 days  Code Status: DNR   Attending Provider: David Gaytan MD   Consulting Provider: Katharina Powell MD  Primary Care Physician: Kalie Neely MD  Principal Problem:Acute on chronic respiratory failure    Patient information was obtained from patient, past medical records and ER records.     Consults  Subjective:     REASON FOR CONSULT:  CHF, atrial fibrillation     HPI:  74-year-old female with a past medical history significant for end-stage renal disease on hemodialysis, tobacco abuse, congestive heart failure, persistent atrial fibrillation presented to the emergency room with complaints of shortness of breath.  Patient reportedly was not able to tolerate dialysis for the full 4 hr and were not able to remove some fluids.  She presented to the emergency room with the same complaints yesterday and left AMA.  She again came back due to worsening symptoms.  She denies any chest pain.  She denies any palpitations.  She has been dialyzed and currently feels better and back to her baseline.  She wants to go home.    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    both legs    CHF (congestive heart failure)     Encounter for blood transfusion 03/2016    Gout     Hypertension     Mitral valve regurgitation     Osteoarthritis     Pancreatitis     Peripheral vascular disease     Peritoneal dialysis catheter in place     Pneumonia 09/09/2017    Renal failure        Past Surgical History:   Procedure Laterality Date    ACHILLES TENDON SURGERY Right     ANGIOGRAPHY OF ARTERIOVENOUS SHUNT Right 1/7/2020    Procedure: Fistulogram with Possible Intervention;  Surgeon: Joseph Loyola MD;  Location: [facility] CATH/EP LAB;  Service: Cardiology;  Laterality: Right;    ANGIOGRAPHY OF LOWER EXTREMITY Left 3/18/2020     Procedure: Angiogram Extremity Unilateral;  Surgeon: Ali Khoobehi, MD;  Location: [facility] CATH/EP LAB;  Service: Cardiology;  Laterality: Left;    APPENDECTOMY      CARDIAC CATHETERIZATION  07/03/2017    CHOLECYSTECTOMY      heal surgery Right     HYSTERECTOMY      INSERTION OF STENT INTO PERIPHERAL VESSEL N/A 1/7/2020    Procedure: INSERTION, STENT, VESSEL, PERIPHERAL;  Surgeon: Joseph Loyola MD;  Location: [facility] CATH/EP LAB;  Service: Cardiology;  Laterality: N/A;    PARATHYROIDECTOMY      PARATHYROIDECTOMY  07/13/2017    PERCUTANEOUS TRANSLUMINAL ANGIOPLASTY OF ARTERIOVENOUS FISTULA N/A 1/7/2020    Procedure: PTA, AV FISTULA;  Surgeon: Joseph Loyola MD;  Location: [facility] CATH/EP LAB;  Service: Cardiology;  Laterality: N/A;    PERITONEAL CATHETER INSERTION      RENAL BIOPSY      vocal cord nodule         Review of patient's allergies indicates:  No Known Allergies    No current facility-administered medications on file prior to encounter.      Current Outpatient Medications on File Prior to Encounter   Medication Sig    calcium acetate (PHOSLO) 667 mg capsule Take 667 mg by mouth once daily.     folic acid/vit B complex and C (JENNIFER-KODAK ORAL) Take 1 tablet by mouth once daily.    isosorbide mononitrate (IMDUR) 30 MG 24 hr tablet Take 30 mg by mouth once daily.    losartan (COZAAR) 25 MG tablet Take 25 mg by mouth once daily.    magnesium oxide (MAG-OX) 400 mg (241.3 mg magnesium) tablet Take 400 mg by mouth once daily.    metoprolol tartrate (LOPRESSOR) 25 MG tablet Take 25 mg by mouth 2 (two) times daily.    warfarin (COUMADIN) 1 MG tablet Take 2 tablets (2 mg total) by mouth Daily. (Patient taking differently: Take 2 mg by mouth every evening. )    aspirin (ECOTRIN) 81 MG EC tablet Take 81 mg by mouth once daily.    diltiaZEM (CARDIZEM CD) 180 MG 24 hr capsule Take 180 mg by mouth once daily.    metoprolol succinate (TOPROL-XL) 50 MG 24 hr tablet Take 1 tablet (50 mg total) by mouth once  daily. (Patient taking differently: Take 50 mg by mouth once daily. )    mupirocin (BACTROBAN) 2 % ointment Apply topically 3 (three) times daily. (Patient taking differently: Apply 1 g topically 3 (three) times daily. )    oxyCODONE-acetaminophen (PERCOCET)  mg per tablet Take 1 tablet by mouth every 4 (four) hours as needed for Pain.       Scheduled Meds:   aspirin  81 mg Oral Daily    calcium acetate(phosphat bind)  667 mg Oral Daily    diltiaZEM  180 mg Oral Daily    losartan  25 mg Oral Daily    metoprolol succinate  50 mg Oral Once    metoprolol tartrate  25 mg Oral BID    warfarin  2 mg Oral Daily     Continuous Infusions:  PRN Meds:.acetaminophen, metoprolol, ondansetron, oxyCODONE-acetaminophen    Family History     Problem Relation (Age of Onset)    Arthritis Mother    Diabetes Mother, Father    Early death Sister, Sister    Heart disease Sister, Maternal Grandfather, Mother    Heart failure Mother    Hypertension Mother          Tobacco Use    Smoking status: Current Some Day Smoker     Packs/day: 0.50     Years: 45.00     Pack years: 22.50     Types: Cigarettes    Smokeless tobacco: Never Used   Substance and Sexual Activity    Alcohol use: No    Drug use: No    Sexual activity: Not on file       ROS   No significant headaches or sore throat or runny nose.   No recent changes in vision.   No recent changes in hearing.  No dysphagia or odynophagia.  Reports shortness of breath.   Denies any cough or hemoptysis.   Denies any abdominal pain, nausea, vomiting, diarrhea or constipation.   Denies any dysuria or polyuria.   Denies any fevers or chills.   Denies any recent significant weight changes.   Denies bleeding diathesis    Objective:     Vital Signs (Most Recent):  Temp: 97.9 °F (36.6 °C) (06/05/20 1145)  Pulse: 75 (06/05/20 1145)  Resp: 20 (06/05/20 1145)  BP: (!) 138/107 (06/05/20 1145)  SpO2: 99 % (06/05/20 1145) Vital Signs (24h Range):  Temp:  [97.9 °F (36.6 °C)-98.5 °F (36.9  °C)] 97.9 °F (36.6 °C)  Pulse:  [] 75  Resp:  [20-32] 20  SpO2:  [97 %-100 %] 99 %  BP: (126-178)/() 138/107     Weight: 61.2 kg (134 lb 14.7 oz)  Body mass index is 22.45 kg/m².    SpO2: 99 %  O2 Device (Oxygen Therapy): room air    No intake or output data in the 24 hours ending 06/05/20 1515    Lines/Drains/Airways     Drain                 Hemodialysis AV Fistula Right upper arm -- days         Hemodialysis AV Fistula 08/08/19 0214 Right upper arm 302 days          Peripheral Intravenous Line                 Peripheral IV - Single Lumen 06/05/20 0030 20 G Left;Anterior Forearm less than 1 day                Physical Exam  HEENT: Normocephalic, atraumatic, PERRL, Conjunctiva pink, no scleral icterus.   CVS: S1S2+, iRRR, no murmurs, rubs or gallops, JVP: Normal.  LUNGS:  Decreased bilateral breath sounds with no wheezing or crackles.  ABDOMEN: Soft, NT, BS+  EXTREMITIES: No cyanosis, clubbing. + edema  NEURO: AAO X 3.     Significant Labs:   BMP:   Recent Labs   Lab 06/03/20 1857 06/05/20  0030 06/05/20 0612   GLU 82 86 121*    137 135*   K 4.4 4.4 5.2*   CL 94* 94* 96   CO2 32* 30* 27   BUN 19 34* 38*   CREATININE 3.5* 4.8* 5.1*   CALCIUM 8.6* 8.4* 8.3*   MG  --   --  2.3   , CMP   Recent Labs   Lab 06/03/20 1857 06/05/20  0030 06/05/20 0612    137 135*   K 4.4 4.4 5.2*   CL 94* 94* 96   CO2 32* 30* 27   GLU 82 86 121*   BUN 19 34* 38*   CREATININE 3.5* 4.8* 5.1*   CALCIUM 8.6* 8.4* 8.3*   PROT 7.7 8.1 7.9   ALBUMIN 3.0* 3.4* 3.3*   BILITOT 0.8 1.0 1.0   ALKPHOS 94 99 98   AST 28 26 31   ALT 20 20 21   ANIONGAP 10 13 12   ESTGFRAFRICA 14.2* 9.7* 9.0*   EGFRNONAA 12.3* 8.4* 7.8*   , CBC   Recent Labs   Lab 06/03/20 1857 06/05/20  0030 06/05/20  0612   WBC 5.34 7.36 6.11   HGB 8.6* 7.3* 8.2*   HCT 28.4* 25.0* 27.4*    274 228   , INR   Recent Labs   Lab 06/03/20 1857 06/05/20 0612   INR 1.6 1.6   , Lipid Panel No results for input(s): CHOL, HDL, LDLCALC, TRIG, CHOLHDL in the  last 48 hours. and Troponin   Recent Labs   Lab 06/03/20  1857 06/05/20  0030   TROPONINI 0.034 0.039       Significant Imaging: Reviewed  Assessment and Plan:     IMPRESSION:  Congestive heart failure.  Preserved LV systolic function.  Euvolemic on exam.  Mitral regurgitation.  Severe.  With severe calcification.  Not a candidate for percutaneous interventions.  After discussion with Dr. Lora, she decided to be treated only with medical management. Status post mitral valve replacement with a size 25 mm [employer] bovine pericardial prosthesis via right thoracotomy on by Dr. Loar 3/21/2019. Newly reduced LVEF to 35-40% on 4/11/2019.   Atrial fibrillation.  Persistent. Currently in atrial fibrillation with controlled ventricular response by exam.  Now taking Coumadin with compliance. INR subtherapeutic.   End-stage renal disease on hemodialysis.  MWF. Follows with Dr. Ingram.  Anemia. Chronic.   Tobacco abuse.  Ongoing despite counseling.  Calciphylaxis.   Seizure disorder.   Left atrial thrombus.      CODE STATUS was addressed on 3/1/2019 when the patient was alert, oriented ×3.  She preferred to be a DNR/DNI.  CODE STATUS was addressed in the presence of her sister. Code status was readdressed on 2/11/2020. Patient still wishes to maintain a DNR status. Her sister, Elvi, will assist in making decisions.      RECOMMENDATIONS:  1.  Tobacco cessation was again discussed.  2.  She was advised to restrict her fluids less than 1.5 L per day and also compliant with salt restrictions as much as possible.  3.  Okay from Cardiology standpoint for the patient to be discharged home.  Continue her home medical regimen without any changes.  4.  Follow-up in the clinic in about 1 week or sooner if needed.  Thank you for your consult. I will sign off. Please contact us if you have any additional questions.    Katharina Powell MD  Cardiology   [facility]

## 2020-06-05 NOTE — HPI
74 year old patient admitted with Acute on Chronic respiratory failure  Patient is an ESRD Pt who receives HD on MWF  She had Dialysis on this Wednesday but felt that enough fluid was not removed  She came to ER yesterday with SOB but Left AMA  Per Pt she Loves eating watermelons   Today her symptoms went worse and she came to ER and Got admitted   She denies any other issues . Currently she is now on BiPAP when seen in ER

## 2020-06-05 NOTE — ASSESSMENT & PLAN NOTE
Normally suffers from chronic encephalopathy  She has been extensively investigated from Neurology point of view   and all the tests were normal  She is on opiates for calciphylaxis and will order this on reduced frequency  Once stable after HD session or sessions pt should  Go home  More she stayed in hospital, her encephalopathy went worse on previous admissions

## 2020-06-05 NOTE — CONSULTS
Consult Note  Nephrology    Griselda DARRICK Neely  female  74 y.o.  Consult Requested By: David Gaytan MD  Reason for Consult: Acute Kidney Injury    SUBJECTIVE:     History of Present Illness:  Patient is a 74 y.o. female presents to the ER for acute SOB, well known to our service for ESRD MWF at [facility] in [place]. 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 Blood pressure also poorly controlled. She is on CPAP at bedside, irritable and ready for dialysis. Discussion about multiple disease process contributing to acute sob including compliance, diet, b/p, pulmonary HTN and history CHF as she originally came in 6/3 then left AMA to return 6/4. Consult placed to manage ESRD during admission. Cassandra dialysis nurse reports she is coming to dialyze patient after 1PM.      Review Of Systems:  GEN:  No fever, chills, night sweats, decreased intake  HEENT: No blurry vision, glasses, floaters, hearing defects, sore throat  CV:  No CP, palpitations, edema, ARGUELLO, orthopnea, PND  PULM:  SOB on CPAP; No Cough, hemoptysis, wheezing   GI:  No nausea, vomiting, constipation, diarrhea, melena, hematochezia, abdominal pain  :  No dysuria, urgency, frequency, cloudy urine, red urine, dribbling, double voiding, incontinence, hx of stones  NEURO: No LOC, seizure activity, dizziness  SKIN:  Wd left inner thigh with dressing ; No rash, pruritis, spots  MS:  No arthralgias, joint swelling, redness or warmth    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    both legs    CHF (congestive heart failure)     Encounter for blood transfusion 03/2016    Gout     Hypertension     Mitral valve regurgitation     Osteoarthritis     Pancreatitis     Peripheral vascular disease     Peritoneal  dialysis catheter in place     Pneumonia 09/09/2017    Renal failure      Past Surgical History:   Procedure Laterality Date    ACHILLES TENDON SURGERY Right     ANGIOGRAPHY OF ARTERIOVENOUS SHUNT Right 1/7/2020    Procedure: Fistulogram with Possible Intervention;  Surgeon: Joseph Loyola MD;  Location: [facility] CATH/EP LAB;  Service: Cardiology;  Laterality: Right;    ANGIOGRAPHY OF LOWER EXTREMITY Left 3/18/2020    Procedure: Angiogram Extremity Unilateral;  Surgeon: Ali Khoobehi, MD;  Location: [facility] CATH/EP LAB;  Service: Cardiology;  Laterality: Left;    APPENDECTOMY      CARDIAC CATHETERIZATION  07/03/2017    CHOLECYSTECTOMY      heal surgery Right     HYSTERECTOMY      INSERTION OF STENT INTO PERIPHERAL VESSEL N/A 1/7/2020    Procedure: INSERTION, STENT, VESSEL, PERIPHERAL;  Surgeon: Joseph Loyola MD;  Location: [facility] CATH/EP LAB;  Service: Cardiology;  Laterality: N/A;    PARATHYROIDECTOMY      PARATHYROIDECTOMY  07/13/2017    PERCUTANEOUS TRANSLUMINAL ANGIOPLASTY OF ARTERIOVENOUS FISTULA N/A 1/7/2020    Procedure: PTA, AV FISTULA;  Surgeon: Joseph Loyola MD;  Location: [facility] CATH/EP LAB;  Service: Cardiology;  Laterality: N/A;    PERITONEAL CATHETER INSERTION      RENAL BIOPSY      vocal cord nodule       Family History   Problem Relation Age of Onset    Heart disease Sister     Early death Sister         heart as baby    Heart disease Maternal Grandfather     Diabetes Mother     Hypertension Mother     Heart failure Mother     Heart disease Mother     Arthritis Mother     Diabetes Father     Early death Sister         infant     Social History     Tobacco Use    Smoking status: Current Some Day Smoker     Packs/day: 0.50     Years: 45.00     Pack years: 22.50     Types: Cigarettes    Smokeless tobacco: Never Used   Substance Use Topics    Alcohol use: No    Drug use: No      Review of patient's allergies indicates:  No Known Allergies   Prior to Admission medications     Medication Sig Start Date End Date Taking? Authorizing Provider   calcium acetate (PHOSLO) 667 mg capsule Take 667 mg by mouth once daily.    Yes Historical Provider, MD   folic acid/vit B complex and C (JENNIFER-KODAK ORAL) Take 1 tablet by mouth once daily.   Yes Historical Provider, MD   isosorbide mononitrate (IMDUR) 30 MG 24 hr tablet Take 30 mg by mouth once daily.   Yes Historical Provider, MD   losartan (COZAAR) 25 MG tablet Take 25 mg by mouth once daily.   Yes Historical Provider, MD   magnesium oxide (MAG-OX) 400 mg (241.3 mg magnesium) tablet Take 400 mg by mouth once daily.   Yes Historical Provider, MD   metoprolol tartrate (LOPRESSOR) 25 MG tablet Take 25 mg by mouth 2 (two) times daily.   Yes Historical Provider, MD   warfarin (COUMADIN) 1 MG tablet Take 2 tablets (2 mg total) by mouth Daily.  Patient taking differently: Take 2 mg by mouth every evening.  3/19/20 6/4/20 Yes Lissa Portillo MD   aspirin (ECOTRIN) 81 MG EC tablet Take 81 mg by mouth once daily.    Historical Provider, MD   diltiaZEM (CARDIZEM CD) 180 MG 24 hr capsule Take 180 mg by mouth once daily.    Historical Provider, MD   metoprolol succinate (TOPROL-XL) 50 MG 24 hr tablet Take 1 tablet (50 mg total) by mouth once daily.  Patient taking differently: Take 50 mg by mouth once daily.  8/10/19 8/9/20  Daren Lea MD   mupirocin (BACTROBAN) 2 % ointment Apply topically 3 (three) times daily.  Patient taking differently: Apply 1 g topically 3 (three) times daily.  2/19/20   Kalie Neely MD   oxyCODONE-acetaminophen (PERCOCET)  mg per tablet Take 1 tablet by mouth every 4 (four) hours as needed for Pain.    Historical Provider, MD              aspirin  81 mg Oral Daily    calcium acetate(phosphat bind)  667 mg Oral Daily    diltiaZEM  180 mg Oral ED 1 Time    diltiaZEM  180 mg Oral Daily    losartan  25 mg Oral Daily    metoprolol succinate  50 mg Oral Once    metoprolol tartrate  25 mg Oral BID    warfarin   2 mg Oral Daily     acetaminophen, metoprolol, ondansetron, oxyCODONE-acetaminophen       OBJECTIVE:     Vital Signs (Most Recent)  Temp: 98.2 °F (36.8 °C) (06/05/20 0500)  Pulse: (!) 112 (06/05/20 0600)  Resp: (!) 25 (06/05/20 0441)  BP: (!) 174/97 (06/05/20 0600)  SpO2: 98 % (06/05/20 0600)    Vital Signs Range (Last 24H):  Temp:  [98.2 °F (36.8 °C)-98.5 °F (36.9 °C)]   Pulse:  [101-120]   Resp:  [21-32]   BP: (126-178)/()   SpO2:  [97 %-100 %]     Physical Exam:  General: Awake and alert. CPAP in use  HEENT: No scleral icterus, MM moist.   NECK:  No JVD. Supple,  No swelling, No masses.  CV:  RRR with murmurs; No rubs, gallops.  PULM:  Clear without crackles, rhonchi, wheezes.  ABD:  Soft, nl BS, NT, ND.  EXTR:  No edema, clubbing, cyanosis.   NEURO: Grossly Intact.  SKIN:  No rashes, lesions. Dressing left inner thigh uninterrupted  MS:  No joint effusions.   PSYCH:           Irritable  Dialysis access positive thrill and bruit    Laboratory:  Recent Labs   Lab 06/03/20 1857 06/05/20 0030 06/05/20 0612    137 135*   K 4.4 4.4 5.2*   CL 94* 94* 96   CO2 32* 30* 27   BUN 19 34* 38*   CREATININE 3.5* 4.8* 5.1*   GLU 82 86 121*   CALCIUM 8.6* 8.4* 8.3*       Recent Labs   Lab 06/03/20 1857 06/05/20  0030 06/05/20  0612   WBC 5.34 7.36 6.11   HGB 8.6* 7.3* 8.2*   HCT 28.4* 25.0* 27.4*    274 228           ASSESSMENT/PLAN:     1. 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 Calciphylaxis- wd left inner thigh, defer HM  5. Hyperkalemia- adjust dialysis bath today  6. Hyponatremia-UF with HD  7. HTN- continue home meds, clonidine prn, UF with HD, needs outpatient diary to review in center to titrate meds    Okay for dc after dialysis from renal standpoint    RL Chung/Joseph Ingram M.D.

## 2020-06-05 NOTE — HOSPITAL COURSE
Patient was admitted with pulmonary edema with volume overload in and states renal disease.  Patient was dialyzed later and symptoms improved and discharged in stable condition.  She was arranged for another dialysis on Monday by nephrology.

## 2020-06-05 NOTE — ASSESSMENT & PLAN NOTE
Normally on Betablockers/Ca Channel blockers at Home which Will be resumed  Can have iv toprol pushes PRN basis  Suffers from chronic electrolyte abnormalities and chronic end stage heart issues

## 2020-06-05 NOTE — ED PROVIDER NOTES
"Encounter Date: 6/4/2020       History     Chief Complaint   Patient presents with    Shortness of Breath     HPI     Pt is a 74yoF with multiple chronic problems such as chf, anemia, htn, gout that presents with shortness of breath. States been going on for the past several days at this time. Denies any fever/chills. No nausea/vomiting. Denies any chest pain whatsoever at this time. Pt states that feels like she did not get enough dialysis yesterday and has felt short of breath ever since that time. States symptoms have been progressive since that time. No aggrevating or reliving factors.     Pt presented for similar presentation yesterday and left AMA after "you didn't do anything for me."     Review of patient's allergies indicates:  No Known Allergies  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    both legs    CHF (congestive heart failure)     Encounter for blood transfusion 03/2016    Gout     Hypertension     Mitral valve regurgitation     Osteoarthritis     Pancreatitis     Peripheral vascular disease     Peritoneal dialysis catheter in place     Pneumonia 09/09/2017    Renal failure      Past Surgical History:   Procedure Laterality Date    ACHILLES TENDON SURGERY Right     ANGIOGRAPHY OF ARTERIOVENOUS SHUNT Right 1/7/2020    Procedure: Fistulogram with Possible Intervention;  Surgeon: Joseph Loyola MD;  Location: [facility] CATH/EP LAB;  Service: Cardiology;  Laterality: Right;    ANGIOGRAPHY OF LOWER EXTREMITY Left 3/18/2020    Procedure: Angiogram Extremity Unilateral;  Surgeon: Ali Khoobehi, MD;  Location: [facility] CATH/EP LAB;  Service: Cardiology;  Laterality: Left;    APPENDECTOMY      CARDIAC CATHETERIZATION  07/03/2017    CHOLECYSTECTOMY      heal surgery Right     HYSTERECTOMY      INSERTION OF STENT INTO PERIPHERAL VESSEL N/A 1/7/2020    Procedure: INSERTION, STENT, VESSEL, PERIPHERAL;  Surgeon: Joseph Loyola MD;  Location: [facility] CATH/EP LAB;  " Service: Cardiology;  Laterality: N/A;    PARATHYROIDECTOMY      PARATHYROIDECTOMY  07/13/2017    PERCUTANEOUS TRANSLUMINAL ANGIOPLASTY OF ARTERIOVENOUS FISTULA N/A 1/7/2020    Procedure: PTA, AV FISTULA;  Surgeon: Joseph Loyola MD;  Location: [facility] CATH/EP LAB;  Service: Cardiology;  Laterality: N/A;    PERITONEAL CATHETER INSERTION      RENAL BIOPSY      vocal cord nodule       Family History   Problem Relation Age of Onset    Heart disease Sister     Early death Sister         heart as baby    Heart disease Maternal Grandfather     Diabetes Mother     Hypertension Mother     Heart failure Mother     Heart disease Mother     Arthritis Mother     Diabetes Father     Early death Sister         infant     Social History     Tobacco Use    Smoking status: Current Some Day Smoker     Packs/day: 0.50     Years: 45.00     Pack years: 22.50     Types: Cigarettes    Smokeless tobacco: Never Used   Substance Use Topics    Alcohol use: No    Drug use: No     Review of Systems   Constitutional: Negative for fever.   HENT: Negative for sore throat.    Respiratory: Positive for shortness of breath.    Cardiovascular: Negative for chest pain.   Gastrointestinal: Negative for nausea.   Genitourinary: Negative for dysuria.   Musculoskeletal: Negative for back pain.   Skin: Negative for rash.   Neurological: Negative for weakness.   Hematological: Does not bruise/bleed easily.       Physical Exam     Initial Vitals [06/04/20 2235]   BP Pulse Resp Temp SpO2   126/80 (!) 111 (!) 28 98.3 °F (36.8 °C) 97 %      MAP       --         Physical Exam    Nursing note and vitals reviewed.  Constitutional: She appears well-developed and well-nourished. She is not diaphoretic. No distress.   HENT:   Head: Normocephalic and atraumatic.   Right Ear: External ear normal.   Left Ear: External ear normal.   Nose: Nose normal.   Mouth/Throat: Oropharynx is clear and moist.   Eyes: Conjunctivae and EOM are normal. Pupils are  equal, round, and reactive to light. No scleral icterus.   Neck: Normal range of motion. Neck supple. No tracheal deviation present.   Cardiovascular: Normal rate and intact distal pulses.   Pulmonary/Chest: No respiratory distress. She has no wheezes. She has rales.   Bilateral crackles to mid lung fields.    Abdominal: Soft. Bowel sounds are normal. She exhibits no distension. There is no tenderness. There is no rebound and no guarding.   Musculoskeletal: Normal range of motion. She exhibits no edema or tenderness.   Neurological: She is alert and oriented to person, place, and time.   Skin: Skin is warm and dry. Capillary refill takes less than 2 seconds. No erythema.         ED Course   Procedures  Labs Reviewed   CBC W/ AUTO DIFFERENTIAL   COMPREHENSIVE METABOLIC PANEL   B-TYPE NATRIURETIC PEPTIDE   TROPONIN I   POCT GLUCOSE MONITORING CONTINUOUS     EKG Readings: (Independently Interpreted)   , irreguarly irregular. No TWI. No ANGELO/Depression. No significant findings.     PGY-3 MDM  Vitals:    06/05/20 0109   BP:    Pulse: 107   Resp: (!) 21   Temp:      DDx includes but not limited to chf/copd exacerbation, acs, pna, pleural effusion.     Pt is stable at this time. Slightly tacpypenic however otherwise stable. Pt endorsing shortness of breath but satting 100% on 4L. Will place on BiPAP for comfort at this time. Afebrile. CXR shows gross overload and with BNP yesterday of >4000 will most likely need to come into the hospital if pt agrees to at this time.     Harinder Connelly M.D.  [facility] Emergency Medicine, PGY-3  06/05/2020 1:27 AM      Imaging Results          X-Ray Chest AP Portable (In process)                                                  Clinical Impression:       ICD-10-CM ICD-9-CM   1. SOB (shortness of breath) R06.02 786.05   2. Shortness of breath R06.02 786.05   3. Hypervolemia, unspecified hypervolemia type E87.70 276.69                                Harinder Connelly,  MD  Resident  06/05/20 0134

## 2020-06-05 NOTE — ED NOTES
Pt placed on transport cardiac monitor and brought to room 2526 accompanied by respiratory therapist who placed pt on NC for transport.

## 2020-06-08 ENCOUNTER — TELEPHONE (OUTPATIENT)
Dept: FAMILY MEDICINE | Facility: CLINIC | Age: 75
End: 2020-06-08

## 2020-06-12 ENCOUNTER — DOCUMENT SCAN (OUTPATIENT)
Dept: HOME HEALTH SERVICES | Facility: HOSPITAL | Age: 75
End: 2020-06-12

## 2020-06-14 ENCOUNTER — HOSPITAL ENCOUNTER (INPATIENT)
Facility: HOSPITAL | Age: 75
LOS: 1 days | Discharge: LEFT BEFORE DISCHARGE | DRG: 377 | End: 2020-06-15
Attending: EMERGENCY MEDICINE | Admitting: INTERNAL MEDICINE
Payer: MEDICARE

## 2020-06-14 DIAGNOSIS — J96.01 ACUTE RESPIRATORY FAILURE WITH HYPOXIA: ICD-10-CM

## 2020-06-14 DIAGNOSIS — N18.6 ESRD (END STAGE RENAL DISEASE): ICD-10-CM

## 2020-06-14 DIAGNOSIS — E87.70 HYPERVOLEMIA, UNSPECIFIED HYPERVOLEMIA TYPE: Primary | ICD-10-CM

## 2020-06-14 DIAGNOSIS — K92.1 MELENA: ICD-10-CM

## 2020-06-14 DIAGNOSIS — R07.9 CHEST PAIN: ICD-10-CM

## 2020-06-14 LAB
ABO + RH BLD: NORMAL
ALBUMIN SERPL BCP-MCNC: 3.3 G/DL (ref 3.5–5.2)
ALP SERPL-CCNC: 81 U/L (ref 55–135)
ALT SERPL W/O P-5'-P-CCNC: 14 U/L (ref 10–44)
ANION GAP SERPL CALC-SCNC: 14 MMOL/L (ref 8–16)
AST SERPL-CCNC: 22 U/L (ref 10–40)
BASOPHILS # BLD AUTO: 0.02 K/UL (ref 0–0.2)
BASOPHILS NFR BLD: 0.3 % (ref 0–1.9)
BILIRUB SERPL-MCNC: 0.9 MG/DL (ref 0.1–1)
BLD GP AB SCN CELLS X3 SERPL QL: NORMAL
BLD PROD TYP BPU: NORMAL
BLD PROD TYP BPU: NORMAL
BLOOD UNIT EXPIRATION DATE: NORMAL
BLOOD UNIT EXPIRATION DATE: NORMAL
BLOOD UNIT TYPE CODE: 7300
BLOOD UNIT TYPE CODE: 7300
BLOOD UNIT TYPE: NORMAL
BLOOD UNIT TYPE: NORMAL
BUN SERPL-MCNC: 86 MG/DL (ref 8–23)
CALCIUM SERPL-MCNC: 7.2 MG/DL (ref 8.7–10.5)
CHLORIDE SERPL-SCNC: 96 MMOL/L (ref 95–110)
CO2 SERPL-SCNC: 27 MMOL/L (ref 23–29)
CODING SYSTEM: NORMAL
CODING SYSTEM: NORMAL
CREAT SERPL-MCNC: 6 MG/DL (ref 0.5–1.4)
DIFFERENTIAL METHOD: ABNORMAL
DISPENSE STATUS: NORMAL
DISPENSE STATUS: NORMAL
EOSINOPHIL # BLD AUTO: 0.1 K/UL (ref 0–0.5)
EOSINOPHIL NFR BLD: 1 % (ref 0–8)
ERYTHROCYTE [DISTWIDTH] IN BLOOD BY AUTOMATED COUNT: 18.3 % (ref 11.5–14.5)
EST. GFR  (AFRICAN AMERICAN): 7.4 ML/MIN/1.73 M^2
EST. GFR  (NON AFRICAN AMERICAN): 6.4 ML/MIN/1.73 M^2
GLUCOSE SERPL-MCNC: 86 MG/DL (ref 70–110)
HCT VFR BLD AUTO: 18.9 % (ref 37–48.5)
HCT VFR BLD AUTO: 27.9 % (ref 37–48.5)
HGB BLD-MCNC: 5.8 G/DL (ref 12–16)
HGB BLD-MCNC: 8.9 G/DL (ref 12–16)
IMM GRANULOCYTES # BLD AUTO: 0.04 K/UL (ref 0–0.04)
IMM GRANULOCYTES NFR BLD AUTO: 0.5 % (ref 0–0.5)
INR PPP: 3
LYMPHOCYTES # BLD AUTO: 1.2 K/UL (ref 1–4.8)
LYMPHOCYTES NFR BLD: 15.5 % (ref 18–48)
MAGNESIUM SERPL-MCNC: 2 MG/DL (ref 1.6–2.6)
MCH RBC QN AUTO: 28.4 PG (ref 27–31)
MCHC RBC AUTO-ENTMCNC: 30.7 G/DL (ref 32–36)
MCV RBC AUTO: 93 FL (ref 82–98)
MONOCYTES # BLD AUTO: 0.6 K/UL (ref 0.3–1)
MONOCYTES NFR BLD: 8 % (ref 4–15)
NEUTROPHILS # BLD AUTO: 5.8 K/UL (ref 1.8–7.7)
NEUTROPHILS NFR BLD: 74.7 % (ref 38–73)
NRBC BLD-RTO: 0 /100 WBC
NUM UNITS TRANS PACKED RBC: NORMAL
NUM UNITS TRANS PACKED RBC: NORMAL
OB PNL STL: POSITIVE
PLATELET # BLD AUTO: 245 K/UL (ref 150–350)
PMV BLD AUTO: 10.4 FL (ref 9.2–12.9)
POTASSIUM SERPL-SCNC: 4.7 MMOL/L (ref 3.5–5.1)
PROT SERPL-MCNC: 7.7 G/DL (ref 6–8.4)
PROTHROMBIN TIME: 30.3 SEC (ref 10.6–14.8)
RBC # BLD AUTO: 2.04 M/UL (ref 4–5.4)
SARS-COV-2 RDRP RESP QL NAA+PROBE: NEGATIVE
SODIUM SERPL-SCNC: 137 MMOL/L (ref 136–145)
TROPONIN I SERPL DL<=0.01 NG/ML-MCNC: 0.04 NG/ML
TROPONIN I SERPL DL<=0.01 NG/ML-MCNC: 0.04 NG/ML
WBC # BLD AUTO: 7.74 K/UL (ref 3.9–12.7)

## 2020-06-14 PROCEDURE — U0002 COVID-19 LAB TEST NON-CDC: HCPCS

## 2020-06-14 PROCEDURE — 21000000 HC CCU ICU ROOM CHARGE

## 2020-06-14 PROCEDURE — 94660 CPAP INITIATION&MGMT: CPT

## 2020-06-14 PROCEDURE — 82272 OCCULT BLD FECES 1-3 TESTS: CPT

## 2020-06-14 PROCEDURE — 63600175 PHARM REV CODE 636 W HCPCS: Performed by: EMERGENCY MEDICINE

## 2020-06-14 PROCEDURE — 93005 ELECTROCARDIOGRAM TRACING: CPT | Performed by: INTERNAL MEDICINE

## 2020-06-14 PROCEDURE — 86850 RBC ANTIBODY SCREEN: CPT

## 2020-06-14 PROCEDURE — 25000003 PHARM REV CODE 250: Performed by: NURSE PRACTITIONER

## 2020-06-14 PROCEDURE — 85610 PROTHROMBIN TIME: CPT

## 2020-06-14 PROCEDURE — 80053 COMPREHEN METABOLIC PANEL: CPT

## 2020-06-14 PROCEDURE — P9016 RBC LEUKOCYTES REDUCED: HCPCS

## 2020-06-14 PROCEDURE — 83735 ASSAY OF MAGNESIUM: CPT

## 2020-06-14 PROCEDURE — 27000221 HC OXYGEN, UP TO 24 HOURS

## 2020-06-14 PROCEDURE — 99291 CRITICAL CARE FIRST HOUR: CPT

## 2020-06-14 PROCEDURE — 85018 HEMOGLOBIN: CPT

## 2020-06-14 PROCEDURE — C9113 INJ PANTOPRAZOLE SODIUM, VIA: HCPCS | Performed by: EMERGENCY MEDICINE

## 2020-06-14 PROCEDURE — 84484 ASSAY OF TROPONIN QUANT: CPT

## 2020-06-14 PROCEDURE — 85014 HEMATOCRIT: CPT

## 2020-06-14 PROCEDURE — 90935 HEMODIALYSIS ONE EVALUATION: CPT

## 2020-06-14 PROCEDURE — 99900035 HC TECH TIME PER 15 MIN (STAT)

## 2020-06-14 PROCEDURE — 63600175 PHARM REV CODE 636 W HCPCS: Performed by: INTERNAL MEDICINE

## 2020-06-14 PROCEDURE — 36430 TRANSFUSION BLD/BLD COMPNT: CPT

## 2020-06-14 PROCEDURE — 80074 ACUTE HEPATITIS PANEL: CPT

## 2020-06-14 PROCEDURE — 25000003 PHARM REV CODE 250: Performed by: EMERGENCY MEDICINE

## 2020-06-14 PROCEDURE — 86920 COMPATIBILITY TEST SPIN: CPT

## 2020-06-14 PROCEDURE — 85025 COMPLETE CBC W/AUTO DIFF WBC: CPT

## 2020-06-14 PROCEDURE — 84484 ASSAY OF TROPONIN QUANT: CPT | Mod: 91

## 2020-06-14 PROCEDURE — 94761 N-INVAS EAR/PLS OXIMETRY MLT: CPT

## 2020-06-14 RX ORDER — AMOXICILLIN 250 MG
1 CAPSULE ORAL 2 TIMES DAILY PRN
Status: DISCONTINUED | OUTPATIENT
Start: 2020-06-14 | End: 2020-06-15 | Stop reason: HOSPADM

## 2020-06-14 RX ORDER — CALCIUM ACETATE 667 MG/1
667 CAPSULE ORAL DAILY
Status: DISCONTINUED | OUTPATIENT
Start: 2020-06-15 | End: 2020-06-15 | Stop reason: HOSPADM

## 2020-06-14 RX ORDER — LANOLIN ALCOHOL/MO/W.PET/CERES
400 CREAM (GRAM) TOPICAL DAILY
Status: DISCONTINUED | OUTPATIENT
Start: 2020-06-15 | End: 2020-06-15 | Stop reason: HOSPADM

## 2020-06-14 RX ORDER — ACETAMINOPHEN 325 MG/1
650 TABLET ORAL EVERY 4 HOURS PRN
Status: DISCONTINUED | OUTPATIENT
Start: 2020-06-14 | End: 2020-06-15 | Stop reason: HOSPADM

## 2020-06-14 RX ORDER — PHYTONADIONE 10 MG/ML
5 INJECTION, EMULSION INTRAMUSCULAR; INTRAVENOUS; SUBCUTANEOUS ONCE
Status: COMPLETED | OUTPATIENT
Start: 2020-06-14 | End: 2020-06-14

## 2020-06-14 RX ORDER — ISOSORBIDE MONONITRATE 30 MG/1
30 TABLET, EXTENDED RELEASE ORAL DAILY
Status: DISCONTINUED | OUTPATIENT
Start: 2020-06-15 | End: 2020-06-15 | Stop reason: HOSPADM

## 2020-06-14 RX ORDER — ZINC SULFATE 50(220)MG
220 CAPSULE ORAL DAILY
Status: DISCONTINUED | OUTPATIENT
Start: 2020-06-15 | End: 2020-06-15 | Stop reason: HOSPADM

## 2020-06-14 RX ORDER — METOPROLOL TARTRATE 25 MG/1
25 TABLET, FILM COATED ORAL 2 TIMES DAILY
Status: ON HOLD | COMMUNITY
End: 2020-08-19 | Stop reason: HOSPADM

## 2020-06-14 RX ORDER — LOSARTAN POTASSIUM 25 MG/1
25 TABLET ORAL DAILY
Status: DISCONTINUED | OUTPATIENT
Start: 2020-06-15 | End: 2020-06-15 | Stop reason: HOSPADM

## 2020-06-14 RX ORDER — IPRATROPIUM BROMIDE AND ALBUTEROL SULFATE 2.5; .5 MG/3ML; MG/3ML
3 SOLUTION RESPIRATORY (INHALATION) EVERY 6 HOURS PRN
Status: DISCONTINUED | OUTPATIENT
Start: 2020-06-14 | End: 2020-06-15 | Stop reason: HOSPADM

## 2020-06-14 RX ORDER — SODIUM THIOSULFATE 250 MG/ML
25 INJECTION, SOLUTION INTRAVENOUS
Status: DISCONTINUED | OUTPATIENT
Start: 2020-06-15 | End: 2020-06-15

## 2020-06-14 RX ORDER — SODIUM CHLORIDE 9 MG/ML
INJECTION, SOLUTION INTRAVENOUS
Status: DISCONTINUED | OUTPATIENT
Start: 2020-06-14 | End: 2020-06-15

## 2020-06-14 RX ORDER — WARFARIN 4 MG/1
4 TABLET ORAL NIGHTLY
COMMUNITY
End: 2020-07-07 | Stop reason: CLARIF

## 2020-06-14 RX ORDER — PANTOPRAZOLE SODIUM 40 MG/10ML
80 INJECTION, POWDER, LYOPHILIZED, FOR SOLUTION INTRAVENOUS
Status: DISCONTINUED | OUTPATIENT
Start: 2020-06-14 | End: 2020-06-15

## 2020-06-14 RX ORDER — METOPROLOL TARTRATE 25 MG/1
25 TABLET, FILM COATED ORAL 2 TIMES DAILY
Status: DISCONTINUED | OUTPATIENT
Start: 2020-06-14 | End: 2020-06-15 | Stop reason: HOSPADM

## 2020-06-14 RX ORDER — MUPIROCIN 20 MG/G
1 OINTMENT TOPICAL 3 TIMES DAILY
Status: DISCONTINUED | OUTPATIENT
Start: 2020-06-14 | End: 2020-06-15 | Stop reason: HOSPADM

## 2020-06-14 RX ORDER — UREA 10 %
220 LOTION (ML) TOPICAL EVERY OTHER DAY
COMMUNITY
End: 2021-03-16

## 2020-06-14 RX ORDER — ONDANSETRON 2 MG/ML
4 INJECTION INTRAMUSCULAR; INTRAVENOUS EVERY 6 HOURS PRN
Status: DISCONTINUED | OUTPATIENT
Start: 2020-06-14 | End: 2020-06-15 | Stop reason: HOSPADM

## 2020-06-14 RX ORDER — DILTIAZEM HYDROCHLORIDE 180 MG/1
180 CAPSULE, COATED, EXTENDED RELEASE ORAL DAILY
Status: DISCONTINUED | OUTPATIENT
Start: 2020-06-15 | End: 2020-06-15 | Stop reason: HOSPADM

## 2020-06-14 RX ORDER — SODIUM THIOSULFATE 250 MG/ML
25 INJECTION, SOLUTION INTRAVENOUS
Status: DISCONTINUED | OUTPATIENT
Start: 2020-06-15 | End: 2020-06-14

## 2020-06-14 RX ORDER — HYDROCODONE BITARTRATE AND ACETAMINOPHEN 500; 5 MG/1; MG/1
TABLET ORAL
Status: DISCONTINUED | OUTPATIENT
Start: 2020-06-14 | End: 2020-06-15

## 2020-06-14 RX ORDER — HYDROCODONE BITARTRATE AND ACETAMINOPHEN 5; 325 MG/1; MG/1
1 TABLET ORAL EVERY 6 HOURS PRN
Status: DISCONTINUED | OUTPATIENT
Start: 2020-06-14 | End: 2020-06-15 | Stop reason: HOSPADM

## 2020-06-14 RX ORDER — SODIUM CHLORIDE 0.9 % (FLUSH) 0.9 %
10 SYRINGE (ML) INJECTION
Status: DISCONTINUED | OUTPATIENT
Start: 2020-06-14 | End: 2020-06-15 | Stop reason: HOSPADM

## 2020-06-14 RX ADMIN — HYDROCODONE BITARTRATE AND ACETAMINOPHEN 1 TABLET: 5; 325 TABLET ORAL at 05:06

## 2020-06-14 RX ADMIN — METOPROLOL TARTRATE 25 MG: 25 TABLET, FILM COATED ORAL at 09:06

## 2020-06-14 RX ADMIN — HYDROCODONE BITARTRATE AND ACETAMINOPHEN 1 TABLET: 5; 325 TABLET ORAL at 11:06

## 2020-06-14 RX ADMIN — MUPIROCIN 1 G: 20 OINTMENT TOPICAL at 09:06

## 2020-06-14 RX ADMIN — PHYTONADIONE 5 MG: 10 INJECTION, EMULSION INTRAMUSCULAR; INTRAVENOUS; SUBCUTANEOUS at 09:06

## 2020-06-14 RX ADMIN — DEXTROSE 8 MG/HR: 50 INJECTION, SOLUTION INTRAVENOUS at 09:06

## 2020-06-14 NOTE — CARE UPDATE
06/14/20 1700   Patient Assessment/Suction   Level of Consciousness (AVPU) alert   Respiratory Effort Normal;Unlabored   Expansion/Accessory Muscles/Retractions no use of accessory muscles   PRE-TX-O2   O2 Device (Oxygen Therapy) BiPAP   $ Is the patient on Low Flow Oxygen? Yes   Oxygen Concentration (%) 40   Pulse Oximetry Type Continuous   $ Pulse Oximetry - Multiple Charge Pulse Oximetry - Multiple   Preset CPAP/BiPAP Settings   $ Is patient using? Yes   Size of Mask Small   Sized Appropriately? Yes   Equipment Type V60   Airway Device Type small full face mask   Humidifier full   Ipap 14   EPAP (cm H2O) 6   Pressure Support (cm H2O) 8   ITime (sec) 0.8   Rise Time (sec) 1   Patient CPAP/BiPAP Settings   RR Total (Breaths/Min) 30   Tidal Volume (mL) 460   VE Minute Ventilation (L/min) 13.8 L/min   Peak Inspiratory Pressure (cm H2O) 15   TiTOT (%) 34   Total Leak (L/Min) 18   Patient Trigger - ST Mode Only (%) 59   CPAP/BiPAP Alarms   High Pressure (cm H2O) 40   Low Pressure (cm H2O) 10   Low Pressure Delay (Sec) 10   Minute Ventilation (L/Min) 3   High RR (breaths/min) 50   Low RR (breaths/min) 10   Apnea (Sec) 10   Transport Patient   $ Transport Tech Time Charge 15 min  (placed on bipap upon arrival to [facility])   Oxygen Method   (nrb)   Transport to dialysis   Toleration Good   placed on cont pox for dialysis

## 2020-06-14 NOTE — H&P
Critical access hospital Medicine History & Physical Examination   Patient Name: Griselda Neely  MRN: [id number]  Patient Class: IP- Inpatient   Admission Date: 6/14/2020  2:08 PM  Length of Stay: 0  Attending Physician: Homer Carlson MD  Primary Care Provider: Kalie Neely MD  Face-to-Face encounter date: 06/14/2020  Code Status: full code  Chief Complaint: Shortness of Breath (present via EMS, called to home for c/o breathing problem, EMS reports pt is a dialysis pt, monday, wed, fri, due tomorrow for dialysis , ems reports 85% on 5LNC, place on NRB at 15L, EMS reported 100% oxygen saturation )        Patient information was obtained from patient, past medical records and ER records.   HISTORY OF PRESENT ILLNESS:   Griselda Neely is a 74 y.o. Black or  female who  has a past medical history of Anemia, Calciphylaxis (07/2017), CHF (congestive heart failure), Encounter for blood transfusion (03/2016), Gout, Hypertension, Mitral valve regurgitation, Osteoarthritis, Pancreatitis, Peripheral vascular disease, Peritoneal dialysis catheter in place, Pneumonia (09/09/2017), and Renal failure.. The patient presented to [facility] on 6/14/2020 with a primary complaint of Shortness of Breath (present via EMS, called to home for c/o breathing problem, EMS reports pt is a dialysis pt, monday, wed, fri, due tomorrow for dialysis , ems reports 85% on 5LNC, place on NRB at 15L, EMS reported 100% oxygen saturation )    History was obtained from the patient and ER physician Sign-out. Patient presents to the ED with SOB that worsened while she was at [employer] today. SOB is worse with exertion. EMS reports the patient had oxygen saturation in the 80s that was improved with NRB at 15L.  The patient reports black stools over the past week that she had told her home health nurse about. She reports she has been compliant with HD with last being 2 days ago. She is unsure how much fluid  was removed at that time. She denies fever, chills, productive cough, chest pain, abdominal pain, nausea, vomiting, numbness, tingling, or LOC.     In the ED, patient is tachypneic. Other VSS. Oxygen saturation is 100% on bipap 40%. CBC with critical H/H 5/18. Troponin was negative. Occult stool positive. Patient is on coumadin daily and INR is therapeutic at 3.0. EKG shows afib with RVR at 106bpm. CXR findings consistent with fluid overload.     Decision to admit was taken and patient was informed about the plan of care.   REVIEW OF SYSTEMS:   10 Point Review of System was performed and was found to be negative except for that mentioned already in the HPI above.     PAST MEDICAL HISTORY:     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    both legs    CHF (congestive heart failure)     Encounter for blood transfusion 03/2016    Gout     Hypertension     Mitral valve regurgitation     Osteoarthritis     Pancreatitis     Peripheral vascular disease     Peritoneal dialysis catheter in place     Pneumonia 09/09/2017    Renal failure        PAST SURGICAL HISTORY:     Past Surgical History:   Procedure Laterality Date    ACHILLES TENDON SURGERY Right     ANGIOGRAPHY OF ARTERIOVENOUS SHUNT Right 1/7/2020    Procedure: Fistulogram with Possible Intervention;  Surgeon: Joseph Loyola MD;  Location: [facility] CATH/EP LAB;  Service: Cardiology;  Laterality: Right;    ANGIOGRAPHY OF LOWER EXTREMITY Left 3/18/2020    Procedure: Angiogram Extremity Unilateral;  Surgeon: Ali Khoobehi, MD;  Location: [facility] CATH/EP LAB;  Service: Cardiology;  Laterality: Left;    APPENDECTOMY      CARDIAC CATHETERIZATION  07/03/2017    CHOLECYSTECTOMY      heal surgery Right     HYSTERECTOMY      INSERTION OF STENT INTO PERIPHERAL VESSEL N/A 1/7/2020    Procedure: INSERTION, STENT, VESSEL, PERIPHERAL;  Surgeon: Joseph Loyola MD;  Location: [facility] CATH/EP LAB;  Service: Cardiology;  Laterality: N/A;     PARATHYROIDECTOMY      PARATHYROIDECTOMY  07/13/2017    PERCUTANEOUS TRANSLUMINAL ANGIOPLASTY OF ARTERIOVENOUS FISTULA N/A 1/7/2020    Procedure: PTA, AV FISTULA;  Surgeon: Joseph Loyola MD;  Location: [facility] CATH/EP LAB;  Service: Cardiology;  Laterality: N/A;    PERITONEAL CATHETER INSERTION      RENAL BIOPSY      vocal cord nodule         ALLERGIES:   Patient has no known allergies.    FAMILY HISTORY:     Family History   Problem Relation Age of Onset    Heart disease Sister     Early death Sister         heart as baby    Heart disease Maternal Grandfather     Diabetes Mother     Hypertension Mother     Heart failure Mother     Heart disease Mother     Arthritis Mother     Diabetes Father     Early death Sister         infant       SOCIAL HISTORY:     Social History     Tobacco Use    Smoking status: Current Some Day Smoker     Packs/day: 0.50     Years: 45.00     Pack years: 22.50     Types: Cigarettes    Smokeless tobacco: Never Used   Substance Use Topics    Alcohol use: No        Social History     Substance and Sexual Activity   Sexual Activity Not on file        HOME MEDICATIONS:     Prior to Admission medications    Medication Sig Start Date End Date Taking? Authorizing Provider   aspirin (ECOTRIN) 81 MG EC tablet Take 81 mg by mouth once daily.   Yes Historical Provider, MD   calcium acetate (PHOSLO) 667 mg capsule Take 667 mg by mouth once daily.    Yes Historical Provider, MD   diltiaZEM (CARDIZEM CD) 180 MG 24 hr capsule Take 180 mg by mouth once daily.   Yes Historical Provider, MD   folic acid/vit B complex and C (JENNIFER-KODAK ORAL) Take 1 tablet by mouth once daily.   Yes Historical Provider, MD   isosorbide mononitrate (IMDUR) 30 MG 24 hr tablet Take 30 mg by mouth once daily.   Yes Historical Provider, MD   losartan (COZAAR) 25 MG tablet Take 25 mg by mouth once daily.   Yes Historical Provider, MD   magnesium oxide (MAG-OX) 400 mg (241.3 mg magnesium) tablet Take 400 mg by  "mouth once daily.   Yes Historical Provider, MD   metoprolol tartrate (LOPRESSOR) 25 MG tablet Take 25 mg by mouth 2 (two) times daily.   Yes Historical Provider, MD   warfarin (COUMADIN) 4 MG tablet Take 4 mg by mouth every evening.   Yes Historical Provider, MD   zinc sulfate 220 mg Tab tablet Take 220 mg by mouth every other day.   Yes Historical Provider, MD   mupirocin (BACTROBAN) 2 % ointment Apply topically 3 (three) times daily.  Patient taking differently: Apply 1 g topically 3 (three) times daily.  2/19/20   Kalie Neely MD   metoprolol succinate (TOPROL-XL) 50 MG 24 hr tablet Take 1 tablet (50 mg total) by mouth once daily.  Patient taking differently: Take 50 mg by mouth once daily.  8/10/19 6/14/20  Daren Lea MD   oxyCODONE-acetaminophen (PERCOCET)  mg per tablet Take 1 tablet by mouth every 4 (four) hours as needed for Pain.  6/14/20  Historical Provider, MD         PHYSICAL EXAM:   /73   Pulse (!) 111   Temp 98 °F (36.7 °C) (Oral)   Resp (!) 31   Ht 5' 3" (1.6 m)   Wt 70.3 kg (155 lb)   LMP  (LMP Unknown)   SpO2 100%   Breastfeeding No   BMI 27.46 kg/m²   Vitals Reviewed  General appearance: Well-developed, well-nourished Black or  female who is in respiratory distress.  Skin: wound to left inner thigh. Dark discoloration to BLE  Neuro: Motor and sensory exams grossly intact. Good tone. Power in all 4 extremities 5/5. Generalized weakness  HENT: Atraumatic head. Moist mucous membranes of oral cavity.  Eyes: Normal extraocular movements. PERRLA   Neck: Supple. No evidence of lymphadenopathy. No thyroidomegaly.  Lungs: Tachypnea. Crackles to bases.  No wheezing present.   Heart: irregularly irregular rhythm with mild tachycardia 110s. S1 and S2 present with no murmurs/gallop/rub. +2 edema to BLE. No JVD present.   Abdomen: Soft, non-distended, non-tender. No rebound tenderness/guarding. No masses or organomegaly. Bowel sounds are normal. Bladder is " not palpable.   Extremities: No cyanosis, clubbing.  Psych/mental status: Alert and oriented. Anxious. Cooperative. Responds appropriately to questions.   EMERGENCY DEPARTMENT LABS AND IMAGING:     Labs Reviewed   COMPREHENSIVE METABOLIC PANEL - Abnormal; Notable for the following components:       Result Value    BUN, Bld 86 (*)     Creatinine 6.0 (*)     Calcium 7.2 (*)     Albumin 3.3 (*)     eGFR if  7.4 (*)     eGFR if non  6.4 (*)     All other components within normal limits   CBC W/ AUTO DIFFERENTIAL - Abnormal; Notable for the following components:    RBC 2.04 (*)     Hemoglobin 5.8 (*)     Hematocrit 18.9 (*)     Mean Corpuscular Hemoglobin Conc 30.7 (*)     RDW 18.3 (*)     Gran% 74.7 (*)     Lymph% 15.5 (*)     All other components within normal limits    Narrative:     hgb/hct   critical result(s) called and verbal readback obtained from   migdalia stewart rn/er by SHON 06/14/2020 15:08   PROTIME-INR - Abnormal; Notable for the following components:    PT 30.3 (*)     All other components within normal limits   OCCULT BLOOD X 1, STOOL - Abnormal; Notable for the following components:    Occult Blood Positive (*)     All other components within normal limits   MAGNESIUM   TROPONIN I   SARS-COV-2 RNA AMPLIFICATION, QUAL   TROPONIN I   TROPONIN I   HEMOGLOBIN   HEMATOCRIT   TYPE & SCREEN   PREPARE RBC SOFT       X-ray Chest AP Portable   ED Interpretation   Cardiomegaly with bilateral pulmonary congestion and edema      Final Result          ASSESSMENT & PLAN:   Symptomatic anemia:  Likely secondary to GI bleed  Admit to Cardio A  Transfuse 2 units PRBCs  Consult Nephrology so patient can have transfusion on HD  Serial H/H      Acute on chronic respiratory failure:  Bipap  HD today with blood transfusion  Neb treatments prn        GI bleed:  Black stools reported on rectal exam in ED  Occult stool positive  Consult GI  Hold anticoagulation  INR at 3.0; Will give 5mg of Vit K in  anticipation of GI procedure in the AM  Clear liquids; NPO after midnight  Hold coumadin  Daily PT/INR         ESRD on HD:  Consult Nephrology for HD       DVT Prophylaxis: mechanical DVT prophylaxis and will be advised to be as mobile as possible and sit in a chair as tolerated.   ________________________________________________________________________________    Discharge Planning and Disposition: No mobility needs. Ambulating well. Patient will be discharged in 2-3 days  Face-to-Face encounter date: 06/14/2020  Encounter included review of the medical records, interviewing and examining the patient face-to-face, discussion with family and other health care providers including emergency medicine physician, admission orders, interpreting lab/test results and formulating a plan of care.   Medical Decision Making during this encounter was  [_] Low Complexity  [_] Moderate Complexity  [x] High Complexity  _________________________________________________________________________________    INPATIENT LIST OF MEDICATIONS     Current Facility-Administered Medications:     0.9%  NaCl infusion (for blood administration), , Intravenous, Q24H PRN, Chano Tee MD    acetaminophen tablet 650 mg, 650 mg, Oral, Q4H PRN, Rebeca Blair NP    albuterol-ipratropium 2.5 mg-0.5 mg/3 mL nebulizer solution 3 mL, 3 mL, Nebulization, Q6H PRN, Rebeca Blair NP    [START ON 6/15/2020] calcium acetate(phosphat bind) capsule 667 mg, 667 mg, Oral, Daily, Rebeca Blair NP    [START ON 6/15/2020] diltiaZEM 24 hr capsule 180 mg, 180 mg, Oral, Daily, Rebeca Blair NP    [START ON 6/15/2020] isosorbide mononitrate 24 hr tablet 30 mg, 30 mg, Oral, Daily, Rebeca Blair NP    [START ON 6/15/2020] losartan tablet 25 mg, 25 mg, Oral, Daily, Rebeca Blair NP    [START ON 6/15/2020] magnesium oxide tablet 400 mg, 400 mg, Oral, Daily, Rebeca Blair NP    metoprolol tartrate (LOPRESSOR) tablet 25 mg, 25 mg,  Oral, BID, Rebeca Blair NP    mupirocin 2 % ointment 1 g, 1 g, Topical (Top), TID, Rebeca Blair NP    ondansetron injection 4 mg, 4 mg, Intravenous, Q6H PRN, Rebeca Blair NP    pantoprazole (PROTONIX) 40 mg in dextrose 5 % 100 mL infusion, 8 mg/hr, Intravenous, Continuous, Chano Tee MD    pantoprazole injection 80 mg, 80 mg, Intravenous, ED 1 Time, Chano Tee MD    senna-docusate 8.6-50 mg per tablet 1 tablet, 1 tablet, Oral, BID PRN, Rebeca Blair NP    sodium chloride 0.9% flush 10 mL, 10 mL, Intravenous, PRN, Rebeca Blair NP    [START ON 6/15/2020] zinc sulfate capsule 220 mg, 220 mg, Oral, Daily, Rebeca Blair NP    Current Outpatient Medications:     aspirin (ECOTRIN) 81 MG EC tablet, Take 81 mg by mouth once daily., Disp: , Rfl:     calcium acetate (PHOSLO) 667 mg capsule, Take 667 mg by mouth once daily. , Disp: , Rfl:     diltiaZEM (CARDIZEM CD) 180 MG 24 hr capsule, Take 180 mg by mouth once daily., Disp: , Rfl:     folic acid/vit B complex and C (JENNIFER-KODAK ORAL), Take 1 tablet by mouth once daily., Disp: , Rfl:     isosorbide mononitrate (IMDUR) 30 MG 24 hr tablet, Take 30 mg by mouth once daily., Disp: , Rfl:     losartan (COZAAR) 25 MG tablet, Take 25 mg by mouth once daily., Disp: , Rfl:     magnesium oxide (MAG-OX) 400 mg (241.3 mg magnesium) tablet, Take 400 mg by mouth once daily., Disp: , Rfl:     metoprolol tartrate (LOPRESSOR) 25 MG tablet, Take 25 mg by mouth 2 (two) times daily., Disp: , Rfl:     warfarin (COUMADIN) 4 MG tablet, Take 4 mg by mouth every evening., Disp: , Rfl:     zinc sulfate 220 mg Tab tablet, Take 220 mg by mouth every other day., Disp: , Rfl:     mupirocin (BACTROBAN) 2 % ointment, Apply topically 3 (three) times daily. (Patient taking differently: Apply 1 g topically 3 (three) times daily. ), Disp: 30 g, Rfl: 2      Scheduled Meds:   [START ON 6/15/2020] calcium acetate(phosphat bind)  667 mg Oral Daily     [START ON 6/15/2020] diltiaZEM  180 mg Oral Daily    [START ON 6/15/2020] isosorbide mononitrate  30 mg Oral Daily    [START ON 6/15/2020] losartan  25 mg Oral Daily    [START ON 6/15/2020] magnesium oxide  400 mg Oral Daily    metoprolol tartrate  25 mg Oral BID    mupirocin  1 g Topical (Top) TID    pantoprazole  80 mg Intravenous ED 1 Time    [START ON 6/15/2020] zinc sulfate  220 mg Oral Daily     Continuous Infusions:   pantoprozole (PROTONIX) IV infusion       PRN Meds:.sodium chloride, acetaminophen, albuterol-ipratropium, ondansetron, senna-docusate 8.6-50 mg, sodium chloride 0.9%      Rebeca Blair  [facility] Hospitalist  06/14/2020

## 2020-06-14 NOTE — ED NOTES
Dialysis nurse Parris contacted, made aware of dialysis order with blood transfusion during dialysis per conversation KANE Nye

## 2020-06-14 NOTE — ED PROVIDER NOTES
Encounter Date: 6/14/2020       History     Chief Complaint   Patient presents with    Shortness of Breath     present via EMS, called to home for c/o breathing problem, EMS reports pt is a dialysis pt, monday, wed, fri, due tomorrow for dialysis , ems reports 85% on 5LNC, place on NRB at 15L, EMS reported 100% oxygen saturation      Patient presents complaining of difficulty breathing.  Patient has a history of end-stage renal disease and does dialysis on Monday Wednesday and Friday.  Patient had worsening shortness of breath today while at [employer].  Patient had low sats in the 80s per EMS.  Upon arrival to the ER patient in mild-to-moderate respiratory distress.  Patient's nephrologist is Dr. Ingram.  The worst symptoms are moderate.  She has had this multiple times before.        Review of patient's allergies indicates:  No Known Allergies  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    both legs    CHF (congestive heart failure)     Encounter for blood transfusion 03/2016    Gout     Hypertension     Mitral valve regurgitation     Osteoarthritis     Pancreatitis     Peripheral vascular disease     Peritoneal dialysis catheter in place     Pneumonia 09/09/2017    Renal failure      Past Surgical History:   Procedure Laterality Date    ACHILLES TENDON SURGERY Right     ANGIOGRAPHY OF ARTERIOVENOUS SHUNT Right 1/7/2020    Procedure: Fistulogram with Possible Intervention;  Surgeon: Joseph Loyola MD;  Location: [facility] CATH/EP LAB;  Service: Cardiology;  Laterality: Right;    ANGIOGRAPHY OF LOWER EXTREMITY Left 3/18/2020    Procedure: Angiogram Extremity Unilateral;  Surgeon: Ali Khoobehi, MD;  Location: [facility] CATH/EP LAB;  Service: Cardiology;  Laterality: Left;    APPENDECTOMY      CARDIAC CATHETERIZATION  07/03/2017    CHOLECYSTECTOMY      heal surgery Right     HYSTERECTOMY      INSERTION OF STENT INTO PERIPHERAL VESSEL N/A 1/7/2020    Procedure: INSERTION,  STENT, VESSEL, PERIPHERAL;  Surgeon: Joseph Loyola MD;  Location: [facility] CATH/EP LAB;  Service: Cardiology;  Laterality: N/A;    PARATHYROIDECTOMY      PARATHYROIDECTOMY  07/13/2017    PERCUTANEOUS TRANSLUMINAL ANGIOPLASTY OF ARTERIOVENOUS FISTULA N/A 1/7/2020    Procedure: PTA, AV FISTULA;  Surgeon: Joseph Loyola MD;  Location: [facility] CATH/EP LAB;  Service: Cardiology;  Laterality: N/A;    PERITONEAL CATHETER INSERTION      RENAL BIOPSY      vocal cord nodule       Family History   Problem Relation Age of Onset    Heart disease Sister     Early death Sister         heart as baby    Heart disease Maternal Grandfather     Diabetes Mother     Hypertension Mother     Heart failure Mother     Heart disease Mother     Arthritis Mother     Diabetes Father     Early death Sister         infant     Social History     Tobacco Use    Smoking status: Current Some Day Smoker     Packs/day: 0.50     Years: 45.00     Pack years: 22.50     Types: Cigarettes    Smokeless tobacco: Never Used   Substance Use Topics    Alcohol use: No    Drug use: No     Review of Systems   Respiratory: Positive for shortness of breath.    All other systems reviewed and are negative.      Physical Exam     Initial Vitals   BP Pulse Resp Temp SpO2   06/14/20 1422 06/14/20 1422 06/14/20 1422 06/14/20 1422 06/14/20 1426   (!) 132/92 (!) 111 (!) 26 98 °F (36.7 °C) 100 %      MAP       --                Physical Exam    Nursing note and vitals reviewed.  Constitutional: She appears well-nourished.   Elderly, frail, mild distress   HENT:   Head: Normocephalic and atraumatic.   Mouth/Throat: Oropharynx is clear and moist.   Eyes: EOM are normal. Pupils are equal, round, and reactive to light.   Neck: Normal range of motion. Neck supple.   Cardiovascular: Normal rate, regular rhythm, normal heart sounds and intact distal pulses.   Pulmonary/Chest: No respiratory distress.   Crackles at the bases   Abdominal: Soft.   Musculoskeletal:       Comments: 2+ pitting edema bilateral lower extremities   Neurological: She is alert and oriented to person, place, and time.   Vision - Normal  Aphasia - Normal  Neglect - Normal  Pronator drift - Normal  Cerebellum - Normal  RUE strength - Normal  RLE strength - Normal  LUE strength - Normal  LLE strength - Normal   Skin: Skin is warm and dry. Capillary refill takes less than 2 seconds.   Psychiatric: She has a normal mood and affect. Her behavior is normal. Judgment and thought content normal.         ED Course   Procedures  Labs Reviewed   COMPREHENSIVE METABOLIC PANEL - Abnormal; Notable for the following components:       Result Value    Calcium 7.2 (*)     All other components within normal limits   CBC W/ AUTO DIFFERENTIAL - Abnormal; Notable for the following components:    RBC 2.04 (*)     Hemoglobin 5.8 (*)     Hematocrit 18.9 (*)     Mean Corpuscular Hemoglobin Conc 30.7 (*)     RDW 18.3 (*)     All other components within normal limits    Narrative:     hgb/hct   critical result(s) called and verbal readback obtained from   migdalia stewart rn/er by LS1 06/14/2020 15:08   MAGNESIUM   PROTIME-INR   TROPONIN I   SARS-COV-2 RNA AMPLIFICATION, QUAL   TYPE & SCREEN   PREPARE RBC SOFT     EKG Readings: (Independently Interpreted)   EKG is atrial fibrillation, irregular irregular, no ST changes, regular rate     ECG Results          EKG 12-LEAD (In process)  Result time 06/14/20 14:59:16    In process by Interface, Lab In [facility] (06/14/20 14:59:16)                 Narrative:    Test Reason : R07.9,    Vent. Rate : 106 BPM     Atrial Rate : 091 BPM     P-R Int : 000 ms          QRS Dur : 094 ms      QT Int : 342 ms       P-R-T Axes : 000 -32 097 degrees     QTc Int : 454 ms    Atrial fibrillation with rapid ventricular response  Left axis deviation  Abnormal QRS-T angle, consider primary T wave abnormality  Abnormal ECG  When compared with ECG of 04-JUN-2020 22:47,  No significant change was found    Referred By:  [email or web address]   SELF           Confirmed By:                             Imaging Results          X-ray Chest AP Portable (Preliminary result)  Result time 06/14/20 15:14:04    ED Interpretation by Chano Tee MD (06/14/20 15:14:04, Critical access hospital, Emergency Medicine)    Cardiomegaly with bilateral pulmonary congestion and edema                               Medical Decision Making:   ED Management:  Patient required BiPAP for respiratory assistance in the emergency department with relief.  Immediately contacted Dr. Ingram who will arrange dialysis.  Patient's hemoglobin markedly low secondary to end-stage renal disease will also need blood transfusion.  Patient remains awake and alert and clinically stable.  Further blood work is still pending.    Rectal exam does show black stool.  Occult blood pending.  Possibility of GI bleed.              Attending Attestation:         Attending Critical Care:   Critical Care Times:   Direct Patient Care (initial evaluation, reassessments, and time considering the case)................................................................15 minutes.   Additional History from reviewing old medical records or taking additional history from the family, EMS, PCP, etc.......................5 minutes.   Ordering, Reviewing, and Interpreting Diagnostic Studies...............................................................................................................15 minutes.   ==============================================================  · Total Critical Care Time - exclusive of procedural time: 35 minutes.  ==============================================================                            Clinical Impression:       ICD-10-CM ICD-9-CM   1. Hypervolemia, unspecified hypervolemia type  E87.70 276.69   2. ESRD (end stage renal disease)  N18.6 585.6   3. Acute respiratory failure with hypoxia  J96.01 518.81             ED Disposition Condition    Admit                            Chano Tee MD  06/14/20 1518       Chano Tee MD  06/14/20 1519       Chano Tee MD  06/14/20 1607

## 2020-06-15 VITALS
BODY MASS INDEX: 21.41 KG/M2 | SYSTOLIC BLOOD PRESSURE: 152 MMHG | HEIGHT: 63 IN | HEART RATE: 98 BPM | RESPIRATION RATE: 20 BRPM | DIASTOLIC BLOOD PRESSURE: 74 MMHG | TEMPERATURE: 98 F | WEIGHT: 120.81 LBS | OXYGEN SATURATION: 100 %

## 2020-06-15 LAB
ALBUMIN SERPL BCP-MCNC: 3.1 G/DL (ref 3.5–5.2)
ALP SERPL-CCNC: 76 U/L (ref 55–135)
ALT SERPL W/O P-5'-P-CCNC: 13 U/L (ref 10–44)
ANION GAP SERPL CALC-SCNC: 16 MMOL/L (ref 8–16)
AST SERPL-CCNC: 22 U/L (ref 10–40)
BASOPHILS # BLD AUTO: 0.02 K/UL (ref 0–0.2)
BASOPHILS # BLD AUTO: 0.02 K/UL (ref 0–0.2)
BASOPHILS NFR BLD: 0.3 % (ref 0–1.9)
BASOPHILS NFR BLD: 0.3 % (ref 0–1.9)
BILIRUB SERPL-MCNC: 1.6 MG/DL (ref 0.1–1)
BUN SERPL-MCNC: 62 MG/DL (ref 8–23)
CALCIUM SERPL-MCNC: 7.5 MG/DL (ref 8.7–10.5)
CHLORIDE SERPL-SCNC: 95 MMOL/L (ref 95–110)
CO2 SERPL-SCNC: 25 MMOL/L (ref 23–29)
CREAT SERPL-MCNC: 4.6 MG/DL (ref 0.5–1.4)
DIFFERENTIAL METHOD: ABNORMAL
DIFFERENTIAL METHOD: ABNORMAL
EOSINOPHIL # BLD AUTO: 0.1 K/UL (ref 0–0.5)
EOSINOPHIL # BLD AUTO: 0.1 K/UL (ref 0–0.5)
EOSINOPHIL NFR BLD: 1.2 % (ref 0–8)
EOSINOPHIL NFR BLD: 1.5 % (ref 0–8)
ERYTHROCYTE [DISTWIDTH] IN BLOOD BY AUTOMATED COUNT: 18.6 % (ref 11.5–14.5)
ERYTHROCYTE [DISTWIDTH] IN BLOOD BY AUTOMATED COUNT: 19.1 % (ref 11.5–14.5)
EST. GFR  (AFRICAN AMERICAN): 10.2 ML/MIN/1.73 M^2
EST. GFR  (NON AFRICAN AMERICAN): 8.9 ML/MIN/1.73 M^2
GLUCOSE SERPL-MCNC: 76 MG/DL (ref 70–110)
HCT VFR BLD AUTO: 23.9 % (ref 37–48.5)
HCT VFR BLD AUTO: 24.9 % (ref 37–48.5)
HCT VFR BLD AUTO: 26.2 % (ref 37–48.5)
HGB BLD-MCNC: 7.6 G/DL (ref 12–16)
HGB BLD-MCNC: 7.9 G/DL (ref 12–16)
HGB BLD-MCNC: 8.4 G/DL (ref 12–16)
IMM GRANULOCYTES # BLD AUTO: 0.02 K/UL (ref 0–0.04)
IMM GRANULOCYTES # BLD AUTO: 0.03 K/UL (ref 0–0.04)
IMM GRANULOCYTES NFR BLD AUTO: 0.3 % (ref 0–0.5)
IMM GRANULOCYTES NFR BLD AUTO: 0.5 % (ref 0–0.5)
INR PPP: 2.9
LYMPHOCYTES # BLD AUTO: 0.7 K/UL (ref 1–4.8)
LYMPHOCYTES # BLD AUTO: 0.8 K/UL (ref 1–4.8)
LYMPHOCYTES NFR BLD: 10.5 % (ref 18–48)
LYMPHOCYTES NFR BLD: 12.2 % (ref 18–48)
MAGNESIUM SERPL-MCNC: 1.9 MG/DL (ref 1.6–2.6)
MCH RBC QN AUTO: 27.9 PG (ref 27–31)
MCH RBC QN AUTO: 28.7 PG (ref 27–31)
MCHC RBC AUTO-ENTMCNC: 31.8 G/DL (ref 32–36)
MCHC RBC AUTO-ENTMCNC: 32.1 G/DL (ref 32–36)
MCV RBC AUTO: 88 FL (ref 82–98)
MCV RBC AUTO: 89 FL (ref 82–98)
MONOCYTES # BLD AUTO: 0.5 K/UL (ref 0.3–1)
MONOCYTES # BLD AUTO: 0.5 K/UL (ref 0.3–1)
MONOCYTES NFR BLD: 7.3 % (ref 4–15)
MONOCYTES NFR BLD: 7.9 % (ref 4–15)
NEUTROPHILS # BLD AUTO: 5 K/UL (ref 1.8–7.7)
NEUTROPHILS # BLD AUTO: 5.4 K/UL (ref 1.8–7.7)
NEUTROPHILS NFR BLD: 77.6 % (ref 38–73)
NEUTROPHILS NFR BLD: 80.4 % (ref 38–73)
NRBC BLD-RTO: 1 /100 WBC
NRBC BLD-RTO: 1 /100 WBC
PHOSPHATE SERPL-MCNC: 5.2 MG/DL (ref 2.7–4.5)
PLATELET # BLD AUTO: 205 K/UL (ref 150–350)
PLATELET # BLD AUTO: 218 K/UL (ref 150–350)
PMV BLD AUTO: 11 FL (ref 9.2–12.9)
PMV BLD AUTO: 11.3 FL (ref 9.2–12.9)
POTASSIUM SERPL-SCNC: 4 MMOL/L (ref 3.5–5.1)
PROT SERPL-MCNC: 7.2 G/DL (ref 6–8.4)
PROTHROMBIN TIME: 29.3 SEC (ref 10.6–14.8)
RBC # BLD AUTO: 2.72 M/UL (ref 4–5.4)
RBC # BLD AUTO: 2.93 M/UL (ref 4–5.4)
SODIUM SERPL-SCNC: 136 MMOL/L (ref 136–145)
TROPONIN I SERPL DL<=0.01 NG/ML-MCNC: 0.03 NG/ML
TROPONIN I SERPL DL<=0.01 NG/ML-MCNC: 0.04 NG/ML
WBC # BLD AUTO: 6.46 K/UL (ref 3.9–12.7)
WBC # BLD AUTO: 6.75 K/UL (ref 3.9–12.7)

## 2020-06-15 PROCEDURE — 99900035 HC TECH TIME PER 15 MIN (STAT)

## 2020-06-15 PROCEDURE — 25000003 PHARM REV CODE 250: Performed by: EMERGENCY MEDICINE

## 2020-06-15 PROCEDURE — 80053 COMPREHEN METABOLIC PANEL: CPT

## 2020-06-15 PROCEDURE — 27000221 HC OXYGEN, UP TO 24 HOURS

## 2020-06-15 PROCEDURE — 85014 HEMATOCRIT: CPT

## 2020-06-15 PROCEDURE — 85610 PROTHROMBIN TIME: CPT

## 2020-06-15 PROCEDURE — C9113 INJ PANTOPRAZOLE SODIUM, VIA: HCPCS | Performed by: EMERGENCY MEDICINE

## 2020-06-15 PROCEDURE — 94761 N-INVAS EAR/PLS OXIMETRY MLT: CPT

## 2020-06-15 PROCEDURE — 63600175 PHARM REV CODE 636 W HCPCS: Performed by: EMERGENCY MEDICINE

## 2020-06-15 PROCEDURE — 85025 COMPLETE CBC W/AUTO DIFF WBC: CPT

## 2020-06-15 PROCEDURE — 25000003 PHARM REV CODE 250: Performed by: NURSE PRACTITIONER

## 2020-06-15 PROCEDURE — 84484 ASSAY OF TROPONIN QUANT: CPT

## 2020-06-15 PROCEDURE — 25000003 PHARM REV CODE 250: Performed by: INTERNAL MEDICINE

## 2020-06-15 PROCEDURE — 84484 ASSAY OF TROPONIN QUANT: CPT | Mod: 91

## 2020-06-15 PROCEDURE — 90935 HEMODIALYSIS ONE EVALUATION: CPT

## 2020-06-15 PROCEDURE — 85018 HEMOGLOBIN: CPT

## 2020-06-15 PROCEDURE — 83735 ASSAY OF MAGNESIUM: CPT

## 2020-06-15 PROCEDURE — 84100 ASSAY OF PHOSPHORUS: CPT

## 2020-06-15 PROCEDURE — 94660 CPAP INITIATION&MGMT: CPT

## 2020-06-15 PROCEDURE — 36415 COLL VENOUS BLD VENIPUNCTURE: CPT

## 2020-06-15 RX ORDER — HYDROCODONE BITARTRATE AND ACETAMINOPHEN 500; 5 MG/1; MG/1
TABLET ORAL
Status: DISCONTINUED | OUTPATIENT
Start: 2020-06-15 | End: 2020-06-15 | Stop reason: HOSPADM

## 2020-06-15 RX ORDER — SODIUM THIOSULFATE 250 MG/ML
25 INJECTION, SOLUTION INTRAVENOUS
Status: COMPLETED | OUTPATIENT
Start: 2020-06-15 | End: 2020-06-15

## 2020-06-15 RX ADMIN — MAGNESIUM OXIDE 400 MG: 400 TABLET ORAL at 10:06

## 2020-06-15 RX ADMIN — DEXTROSE 8 MG/HR: 50 INJECTION, SOLUTION INTRAVENOUS at 06:06

## 2020-06-15 RX ADMIN — SODIUM THIOSULFATE 25 G: 250 INJECTION, SOLUTION INTRAVENOUS at 10:06

## 2020-06-15 RX ADMIN — LOSARTAN POTASSIUM 25 MG: 25 TABLET, FILM COATED ORAL at 10:06

## 2020-06-15 RX ADMIN — HYDROCODONE BITARTRATE AND ACETAMINOPHEN 1 TABLET: 5; 325 TABLET ORAL at 11:06

## 2020-06-15 RX ADMIN — ISOSORBIDE MONONITRATE 30 MG: 30 TABLET, EXTENDED RELEASE ORAL at 10:06

## 2020-06-15 RX ADMIN — CALCIUM ACETATE 667 MG: 667 CAPSULE ORAL at 10:06

## 2020-06-15 RX ADMIN — DILTIAZEM HYDROCHLORIDE 180 MG: 180 CAPSULE, COATED, EXTENDED RELEASE ORAL at 10:06

## 2020-06-15 RX ADMIN — ZINC SULFATE 220 MG (50 MG) CAPSULE 220 MG: CAPSULE at 10:06

## 2020-06-15 RX ADMIN — DEXTROSE 8 MG/HR: 50 INJECTION, SOLUTION INTRAVENOUS at 12:06

## 2020-06-15 RX ADMIN — METOPROLOL TARTRATE 25 MG: 25 TABLET, FILM COATED ORAL at 10:06

## 2020-06-15 NOTE — PLAN OF CARE
06/15/20 0701   Patient Assessment/Suction   Level of Consciousness (AVPU) alert   Respiratory Effort Short of breath   All Lung Fields Breath Sounds clear   Rhythm/Pattern, Respiratory tachypneic   PRE-TX-O2   O2 Device (Oxygen Therapy) BiPAP   $ Is the patient on Low Flow Oxygen? Yes   Oxygen Concentration (%) 40   SpO2 96 %   Pulse Oximetry Type Continuous   $ Pulse Oximetry - Multiple Charge Pulse Oximetry - Multiple   Pulse (!) 124   Resp (!) 22   Aerosol Therapy   $ Aerosol Therapy Charges PRN treatment not required   Ready to Wean/Extubation Screen   FIO2<=50 (chart decimal) 0.4   Preset CPAP/BiPAP Settings   Mode Of Delivery BiPAP S/T   $ CPAP/BiPAP Daily Charge BiPAP/CPAP Daily   $ Initial CPAP/BiPAP Setup? No   $ Is patient using? Yes   Size of Mask Small   Sized Appropriately? Yes   Equipment Type V60   Ipap 14   EPAP (cm H2O) 6   Pressure Support (cm H2O) 8   ITime (sec) 0.8   Rise Time (sec) 1   Patient CPAP/BiPAP Settings   RR Total (Breaths/Min) 22   Tidal Volume (mL) 621   VE Minute Ventilation (L/min) 13.9 L/min   Peak Inspiratory Pressure (cm H2O) 15   TiTOT (%) 27   Total Leak (L/Min) 15   Patient Trigger - ST Mode Only (%) 97   CPAP/BiPAP Backup Settings   Backup Rate 12 breaths per minute (bpm)   CPAP/BiPAP Alarms   High Pressure (cm H2O) 40   Low Pressure (cm H2O) 10   Minute Ventilation (L/Min) 3   High RR (breaths/min) 50   Low RR (breaths/min) 10   Apnea (Sec) 20   Respiratory Evaluation   $ Care Plan Tech Time 15 min

## 2020-06-15 NOTE — CONSULTS
Chief Complaint:  Anemia melena    HPI:    74-year-old female with CHF ESRD on dialysis who presented to the hospital with progressive severe shortness of breath not associated with nausea hematemesis hemoptysis.  In the ER rectal exam revealed dark black tar-like stool.  She was admitted and is currently undergoing dialysis.  Denies heavy NSAIDs.  Pt remains on bipap.      Of note patient is on Coumadin.  Received vitamin K yesterday.  INR still therapeutic.    Anemia is chronic progressive severe.  Hemoglobin on admission was 5.  Current hemoglobin is 8 after transfusion.  Iron deficiency noted in 2017.  Ferritin currently 912 B12 1045 in 2019.         REVIEW OF SYSTEMS:       Review of Systems:  Complete ROS performed and negative unless stated above in HPI          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    both legs    CHF (congestive heart failure)     Encounter for blood transfusion 03/2016    Gout     Hypertension     Mitral valve regurgitation     Osteoarthritis     Pancreatitis     Peripheral vascular disease     Peritoneal dialysis catheter in place     Pneumonia 09/09/2017    Renal failure        Past Surgical History:   Procedure Laterality Date    ACHILLES TENDON SURGERY Right     ANGIOGRAPHY OF ARTERIOVENOUS SHUNT Right 1/7/2020    Procedure: Fistulogram with Possible Intervention;  Surgeon: Joseph Loyola MD;  Location: [facility] CATH/EP LAB;  Service: Cardiology;  Laterality: Right;    ANGIOGRAPHY OF LOWER EXTREMITY Left 3/18/2020    Procedure: Angiogram Extremity Unilateral;  Surgeon: Ali Khoobehi, MD;  Location: [facility] CATH/EP LAB;  Service: Cardiology;  Laterality: Left;    APPENDECTOMY      CARDIAC CATHETERIZATION  07/03/2017    CHOLECYSTECTOMY      heal surgery Right     HYSTERECTOMY      INSERTION OF STENT INTO PERIPHERAL VESSEL N/A 1/7/2020    Procedure: INSERTION, STENT, VESSEL, PERIPHERAL;  Surgeon: Joseph Loyola MD;  Location: [facility]  CATH/EP LAB;  Service: Cardiology;  Laterality: N/A;    PARATHYROIDECTOMY      PARATHYROIDECTOMY  07/13/2017    PERCUTANEOUS TRANSLUMINAL ANGIOPLASTY OF ARTERIOVENOUS FISTULA N/A 1/7/2020    Procedure: PTA, AV FISTULA;  Surgeon: Joseph Loyola MD;  Location: [facility] CATH/EP LAB;  Service: Cardiology;  Laterality: N/A;    PERITONEAL CATHETER INSERTION      RENAL BIOPSY      vocal cord nodule         Family History   Problem Relation Age of Onset    Heart disease Sister     Early death Sister         heart as baby    Heart disease Maternal Grandfather     Diabetes Mother     Hypertension Mother     Heart failure Mother     Heart disease Mother     Arthritis Mother     Diabetes Father     Early death Sister         infant       Social History     Socioeconomic History    Marital status:      Spouse name: Not on file    Number of children: Not on file    Years of education: Not on file    Highest education level: Not on file   Occupational History    Not on file   Social Needs    Financial resource strain: Not on file    Food insecurity     Worry: Not on file     Inability: Not on file    Transportation needs     Medical: Not on file     Non-medical: Not on file   Tobacco Use    Smoking status: Current Some Day Smoker     Packs/day: 0.50     Years: 45.00     Pack years: 22.50     Types: Cigarettes    Smokeless tobacco: Never Used   Substance and Sexual Activity    Alcohol use: No    Drug use: No    Sexual activity: Not on file   Lifestyle    Physical activity     Days per week: Not on file     Minutes per session: Not on file    Stress: Not on file   Relationships    Social connections     Talks on phone: Not on file     Gets together: Not on file     Attends Jain service: Not on file     Active member of club or organization: Not on file     Attends meetings of clubs or organizations: Not on file     Relationship status: Not on file   Other Topics Concern    Not on file  "  Social History Narrative    Not on file       Review of patient's allergies indicates:  No Known Allergies    No current facility-administered medications on file prior to encounter.      Current Outpatient Medications on File Prior to Encounter   Medication Sig Dispense Refill    aspirin (ECOTRIN) 81 MG EC tablet Take 81 mg by mouth once daily.      calcium acetate (PHOSLO) 667 mg capsule Take 667 mg by mouth once daily.       diltiaZEM (CARDIZEM CD) 180 MG 24 hr capsule Take 180 mg by mouth once daily.      folic acid/vit B complex and C (JENNIFER-KODAK ORAL) Take 1 tablet by mouth once daily.      isosorbide mononitrate (IMDUR) 30 MG 24 hr tablet Take 30 mg by mouth once daily.      losartan (COZAAR) 25 MG tablet Take 25 mg by mouth once daily.      magnesium oxide (MAG-OX) 400 mg (241.3 mg magnesium) tablet Take 400 mg by mouth once daily.      metoprolol tartrate (LOPRESSOR) 25 MG tablet Take 25 mg by mouth 2 (two) times daily.      warfarin (COUMADIN) 4 MG tablet Take 4 mg by mouth every evening.      zinc sulfate 220 mg Tab tablet Take 220 mg by mouth every other day.      mupirocin (BACTROBAN) 2 % ointment Apply topically 3 (three) times daily. (Patient taking differently: Apply 1 g topically 3 (three) times daily. ) 30 g 2       Objective:  BP (!) 144/96   Pulse (!) 114   Temp 98 °F (36.7 °C) (Axillary)   Resp 17   Ht 5' 3" (1.6 m)   Wt 54.8 kg (120 lb 13 oz)   LMP  (LMP Unknown)   SpO2 (!) 85%   Breastfeeding No   BMI 21.40 kg/m²   General: wd, wn, nad chronically ill-appearing. BIPAP  HE: ncat, perrl, eomi  ENT: op pink and moist without lesions or exudates  CV: +s1s2, no mrg, rrr  Resp: ctapb, no wrr  GI: bs active, abd soft, nt, nd  Skin: no lesions, no rash  Neuro: cn 2-12 in tact, no focal deficits, no asterixis  Psych: regular rate speech, normal affect    Labs:  Lab Results   Component Value Date    WBC 6.75 06/15/2020    HGB 8.4 (L) 06/15/2020    HCT 26.2 (L) 06/15/2020    MCV 89 " 06/15/2020     06/15/2020       Lab Results   Component Value Date    INR 2.9 06/15/2020    INR 3.0 06/14/2020    INR 1.6 06/05/2020     Lab Results   Component Value Date    IRON 28 (L) 07/18/2017    TIBC 153 (L) 07/18/2017    FERRITIN 912 (H) 04/22/2020     BMP  Lab Results   Component Value Date     06/15/2020    K 4.0 06/15/2020    CL 95 06/15/2020    CO2 25 06/15/2020    BUN 62 (H) 06/15/2020    CREATININE 4.6 (H) 06/15/2020    CALCIUM 7.5 (L) 06/15/2020    ANIONGAP 16 06/15/2020    ESTGFRAFRICA 10.2 (A) 06/15/2020    EGFRNONAA 8.9 (A) 06/15/2020       Assessment:  74 F with ESRD on bipap  Melena    Plan:  EGD tomorrow with MAC

## 2020-06-15 NOTE — CONSULTS
"Consult Note  Nephrology    Griselda DARRICK Neely  female  74 y.o.  Consult Requested By: Cierra Thomas MD  Reason for Consult: Acute Kidney Injury    SUBJECTIVE:     History of Present Illness:  Patient is a 74 y.o. female presents with acute sob x 2 days. She is well known to service due to non compliance with treatments out patient at [facility]. She reports she became acutely sob despite dialysis and came ti the ER via ambulance. She was found to be tachypneic, in afib with RVR, critical low Hgb/Hct and positive OBS. Rec'd transfusions and HD and continues to be sob on bipap. Nurse reports EGD in am but patient threatening to go home AMA.  Tried to reason with patient and she states "I will not be here tomorrow".  Informed risk death with dc AMA before complete medical work up can be completed. Verbalized understanding to risk    Upon exam post dialysis she is found to have positive JVD with crackles bilat bases posterior. Will need HD daily with strict fluid restriction. GI workup needed if patient agreeable. Also with history pulmonary HTN and peripheral HTN contributing to symptoms. Nephrology consulted for HD management while inpatient. Case discussed with Dr Ingram who saw patient on dialysis today.       Review Of Systems:  GEN:  No fever, chills, night sweats, decreased intake  HEENT: No blurry vision, glasses, floaters, hearing defects, sore throat  CV:  No CP, palpitations, edema, ARGUELLO, orthopnea, PND  PULM:  Cough, SOB on bipap; Denies hemoptysis, wheezing   GI:  Melena; No nausea, vomiting, constipation, diarrhea, hematochezia, abdominal pain  :  No dysuria, urgency, frequency, cloudy urine, red urine, dribbling, double voiding, incontinence, hx of stones  NEURO: No LOC, seizure activity, dizziness  SKIN:  No rash, pruritis, spots, sores  MS:  No arthralgias, joint swelling, redness or warmth    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    both legs    " CHF (congestive heart failure)     Encounter for blood transfusion 03/2016    Gout     Hypertension     Mitral valve regurgitation     Osteoarthritis     Pancreatitis     Peripheral vascular disease     Peritoneal dialysis catheter in place     Pneumonia 09/09/2017    Renal failure      Past Surgical History:   Procedure Laterality Date    ACHILLES TENDON SURGERY Right     ANGIOGRAPHY OF ARTERIOVENOUS SHUNT Right 1/7/2020    Procedure: Fistulogram with Possible Intervention;  Surgeon: Joseph Loyola MD;  Location: [facility] CATH/EP LAB;  Service: Cardiology;  Laterality: Right;    ANGIOGRAPHY OF LOWER EXTREMITY Left 3/18/2020    Procedure: Angiogram Extremity Unilateral;  Surgeon: Ali Khoobehi, MD;  Location: [facility] CATH/EP LAB;  Service: Cardiology;  Laterality: Left;    APPENDECTOMY      CARDIAC CATHETERIZATION  07/03/2017    CHOLECYSTECTOMY      heal surgery Right     HYSTERECTOMY      INSERTION OF STENT INTO PERIPHERAL VESSEL N/A 1/7/2020    Procedure: INSERTION, STENT, VESSEL, PERIPHERAL;  Surgeon: Joseph Loyola MD;  Location: [facility] CATH/EP LAB;  Service: Cardiology;  Laterality: N/A;    PARATHYROIDECTOMY      PARATHYROIDECTOMY  07/13/2017    PERCUTANEOUS TRANSLUMINAL ANGIOPLASTY OF ARTERIOVENOUS FISTULA N/A 1/7/2020    Procedure: PTA, AV FISTULA;  Surgeon: Joseph Loyola MD;  Location: [facility] CATH/EP LAB;  Service: Cardiology;  Laterality: N/A;    PERITONEAL CATHETER INSERTION      RENAL BIOPSY      vocal cord nodule       Family History   Problem Relation Age of Onset    Heart disease Sister     Early death Sister         heart as baby    Heart disease Maternal Grandfather     Diabetes Mother     Hypertension Mother     Heart failure Mother     Heart disease Mother     Arthritis Mother     Diabetes Father     Early death Sister         infant     Social History     Tobacco Use    Smoking status: Current Some Day Smoker     Packs/day: 0.50     Years: 45.00     Pack years: 22.50      Types: Cigarettes    Smokeless tobacco: Never Used   Substance Use Topics    Alcohol use: No    Drug use: No      Review of patient's allergies indicates:  No Known Allergies   Prior to Admission medications    Medication Sig Start Date End Date Taking? Authorizing Provider   aspirin (ECOTRIN) 81 MG EC tablet Take 81 mg by mouth once daily.   Yes Historical Provider, MD   calcium acetate (PHOSLO) 667 mg capsule Take 667 mg by mouth once daily.    Yes Historical Provider, MD   diltiaZEM (CARDIZEM CD) 180 MG 24 hr capsule Take 180 mg by mouth once daily.   Yes Historical Provider, MD   folic acid/vit B complex and C (JENNIFER-KODAK ORAL) Take 1 tablet by mouth once daily.   Yes Historical Provider, MD   isosorbide mononitrate (IMDUR) 30 MG 24 hr tablet Take 30 mg by mouth once daily.   Yes Historical Provider, MD   losartan (COZAAR) 25 MG tablet Take 25 mg by mouth once daily.   Yes Historical Provider, MD   magnesium oxide (MAG-OX) 400 mg (241.3 mg magnesium) tablet Take 400 mg by mouth once daily.   Yes Historical Provider, MD   metoprolol tartrate (LOPRESSOR) 25 MG tablet Take 25 mg by mouth 2 (two) times daily.   Yes Historical Provider, MD   warfarin (COUMADIN) 4 MG tablet Take 4 mg by mouth every evening.   Yes Historical Provider, MD   zinc sulfate 220 mg Tab tablet Take 220 mg by mouth every other day.   Yes Historical Provider, MD   mupirocin (BACTROBAN) 2 % ointment Apply topically 3 (three) times daily.  Patient taking differently: Apply 1 g topically 3 (three) times daily.  2/19/20   Kalie Neely MD           pantoprozole (PROTONIX) IV infusion 8 mg/hr (06/15/20 1241)       calcium acetate(phosphat bind)  667 mg Oral Daily    diltiaZEM  180 mg Oral Daily    isosorbide mononitrate  30 mg Oral Daily    losartan  25 mg Oral Daily    magnesium oxide  400 mg Oral Daily    metoprolol tartrate  25 mg Oral BID    mupirocin  1 g Topical (Top) TID    zinc sulfate  220 mg Oral Daily     sodium  chloride, acetaminophen, albuterol-ipratropium, HYDROcodone-acetaminophen, ondansetron, senna-docusate 8.6-50 mg, sodium chloride 0.9%       OBJECTIVE:     Vital Signs (Most Recent)  Temp: 98 °F (36.7 °C) (06/15/20 0945)  Pulse: (!) 125 (06/15/20 1230)  Resp: 17 (06/15/20 1030)  BP: 121/85 (06/15/20 1215)  SpO2: (!) 85 % (06/15/20 0715)    Vital Signs Range (Last 24H):  Temp:  [97.8 °F (36.6 °C)-98.6 °F (37 °C)]   Pulse:  [102-139]   Resp:  [17-31]   BP: (112-157)/()   SpO2:  [85 %-100 %]     Physical Exam:  General: Awake and alert. Agitated on bipap  HEENT: No scleral icterus, MM moist   NECK:  JVD. Supple,  No swelling, No masses.  CV:  Irregular with tachycardia.  PULM:  Crackles bilat bases posterior to midline back; No rhonchi, wheezes.  ABD:  Soft, nl BS, NT, ND.  EXTR:  No edema, clubbing, cyanosis.   NEURO: Grossly Intact.  SKIN:  No rashes, lesions.   MS:  No joint effusions.   PSYCH: Irritable and agitated  Dialysis access with positive thrill and nbruit    Laboratory:  Recent Labs   Lab 06/14/20  1447 06/15/20  0214    136   K 4.7 4.0   CL 96 95   CO2 27 25   BUN 86* 62*   CREATININE 6.0* 4.6*   GLU 86 76   CALCIUM 7.2* 7.5*   PHOS  --  5.2*       Recent Labs   Lab 06/14/20  1447 06/14/20  1832 06/15/20  0214 06/15/20  0940   WBC 7.74  --  6.46 6.75   HGB 5.8* 8.9* 7.6* 8.4*   HCT 18.9* 27.9* 23.9* 26.2*     --  205 218           ASSESSMENT/PLAN:     1. ESRD- daily to control volume, renal diet, Strict I&O and daily weights; renal dose all meds; UF complicated by pulmonary HTN  2. Anemia- below goal, epo with HD, transfuse as needed, GI work up for melena and positive OBS needed, check iron studies and replete if indicated  3.2nd HPT- calcium correct low albumin, Phos fairly controlled, okay to hold until after GI EGD complete  4.HTN- continue home meds, titrate to systolic goal <130  5. Afib- defer HM or cardiology  6. History recurrent hospitalization and compliance issues- Needs SW  evaluation inpatient and outpatient in home setting      RL Chung/Joseph Ingram M.D.

## 2020-06-15 NOTE — NURSING
Per Dr Payne --- patient wants to leave AMA. Have her sign the paperwork.     Patient signed paperwork and sister is coming to get the patient.

## 2020-06-15 NOTE — NURSING
H = 8.4  H = 26.2  Per Dr Payne - hold the PRBC for dialysis. ANTWAN Friedman with Dialysis made aware.

## 2020-06-15 NOTE — NURSING
Patient left AMA Dr Payne aware. 1 piv removed without difficulty. Patient wheeled out to car where family drove her home.

## 2020-06-15 NOTE — NURSING
Consent verified, removed 3000uf net, post thrill and bruit noted, no issues with AVFexcept arterial site bled > 8 min. Patient received 2 units RBCs on HD. Patient c/o left leg pain throughout treatment, pain medicine administered PO, no change noted.

## 2020-06-15 NOTE — RESPIRATORY THERAPY
This note also relates to the following rows which could not be included:  SpO2 - Cannot attach notes to unvalidated device data  Pulse - Cannot attach notes to unvalidated device data  Resp - Cannot attach notes to unvalidated device data       06/15/20 0150   Patient Assessment/Suction   Level of Consciousness (AVPU) alert   Respiratory Effort Unlabored;Normal   Expansion/Accessory Muscles/Retractions no use of accessory muscles;no retractions;expansion symmetric   All Lung Fields Breath Sounds clear   Rhythm/Pattern, Respiratory shortness of breath   PRE-TX-O2   O2 Device (Oxygen Therapy) nasal cannula   $ Is the patient on Low Flow Oxygen? Yes   Flow (L/min) 2   Oxygen Concentration (%) 28   Pulse Oximetry Type Continuous   $ Pulse Oximetry - Multiple Charge Pulse Oximetry - Multiple   Aerosol Therapy   $ Aerosol Therapy Charges PRN treatment not required   Respiratory Treatment Status (SVN) PRN treatment not required   Wound Care   $ Wound Care Tech Time 15 min   Area of Concern Bridge of nose   Skin Color/Characteristics without discoloration   Skin Temperature cool   Ready to Wean/Extubation Screen   FIO2<=50 (chart decimal) 0.28   Preset CPAP/BiPAP Settings   Mode Of Delivery Standby   Size of Mask Small   Sized Appropriately? Yes   Equipment Type V60   Airway Device Type small full face mask   Ipap 14   EPAP (cm H2O) 6   Pressure Support (cm H2O) 8   ITime (sec) 0.8   Rise Time (sec) 1

## 2020-06-16 ENCOUNTER — TELEPHONE (OUTPATIENT)
Dept: FAMILY MEDICINE | Facility: CLINIC | Age: 75
End: 2020-06-16

## 2020-06-16 LAB
HAV IGM SERPL QL IA: NEGATIVE
HBV CORE IGM SERPL QL IA: NEGATIVE
HBV SURFACE AG SERPL QL IA: NEGATIVE
HCV AB S/CO SERPL IA: 0.1 S/CO RATIO (ref 0–0.9)

## 2020-06-18 LAB
BLD PROD TYP BPU: NORMAL
BLOOD UNIT EXPIRATION DATE: NORMAL
BLOOD UNIT TYPE CODE: 7300
BLOOD UNIT TYPE: NORMAL
CODING SYSTEM: NORMAL
DISPENSE STATUS: NORMAL
NUM UNITS TRANS PACKED RBC: NORMAL

## 2020-07-01 ENCOUNTER — DOCUMENT SCAN (OUTPATIENT)
Dept: HOME HEALTH SERVICES | Facility: HOSPITAL | Age: 75
End: 2020-07-01

## 2020-07-07 ENCOUNTER — HOSPITAL ENCOUNTER (INPATIENT)
Facility: HOSPITAL | Age: 75
LOS: 7 days | Discharge: HOME HEALTH | DRG: 622 | End: 2020-07-15
Attending: EMERGENCY MEDICINE | Admitting: INTERNAL MEDICINE
Payer: MEDICARE

## 2020-07-07 DIAGNOSIS — T14.8XXA WOUND INFECTION: Primary | ICD-10-CM

## 2020-07-07 DIAGNOSIS — L08.9 WOUND INFECTION: Primary | ICD-10-CM

## 2020-07-07 DIAGNOSIS — L97.922 CALCIPHYLAXIS OF LEFT LOWER EXTREMITY WITH NONHEALING ULCER WITH FAT LAYER EXPOSED: ICD-10-CM

## 2020-07-07 DIAGNOSIS — E83.59 CALCIPHYLAXIS OF LEFT LOWER EXTREMITY WITH NONHEALING ULCER WITH FAT LAYER EXPOSED: ICD-10-CM

## 2020-07-07 DIAGNOSIS — I48.19 OTHER PERSISTENT ATRIAL FIBRILLATION: ICD-10-CM

## 2020-07-07 DIAGNOSIS — N18.6 ESRD (END STAGE RENAL DISEASE): Chronic | ICD-10-CM

## 2020-07-07 DIAGNOSIS — N18.9 CHRONIC KIDNEY DISEASE: ICD-10-CM

## 2020-07-07 LAB
ALBUMIN SERPL BCP-MCNC: 3 G/DL (ref 3.5–5.2)
ALP SERPL-CCNC: 133 U/L (ref 55–135)
ALT SERPL W/O P-5'-P-CCNC: 20 U/L (ref 10–44)
ANION GAP SERPL CALC-SCNC: 12 MMOL/L (ref 8–16)
AST SERPL-CCNC: 38 U/L (ref 10–40)
BASOPHILS # BLD AUTO: 0 K/UL (ref 0–0.2)
BASOPHILS NFR BLD: 0 % (ref 0–1.9)
BILIRUB SERPL-MCNC: 1.3 MG/DL (ref 0.1–1)
BUN SERPL-MCNC: 37 MG/DL (ref 8–23)
CALCIUM SERPL-MCNC: 8.4 MG/DL (ref 8.7–10.5)
CHLORIDE SERPL-SCNC: 94 MMOL/L (ref 95–110)
CO2 SERPL-SCNC: 27 MMOL/L (ref 23–29)
CREAT SERPL-MCNC: 5.3 MG/DL (ref 0.5–1.4)
DIFFERENTIAL METHOD: ABNORMAL
EOSINOPHIL # BLD AUTO: 0 K/UL (ref 0–0.5)
EOSINOPHIL NFR BLD: 0.4 % (ref 0–8)
ERYTHROCYTE [DISTWIDTH] IN BLOOD BY AUTOMATED COUNT: 19.2 % (ref 11.5–14.5)
EST. GFR  (AFRICAN AMERICAN): 8.6 ML/MIN/1.73 M^2
EST. GFR  (NON AFRICAN AMERICAN): 7.5 ML/MIN/1.73 M^2
GLUCOSE SERPL-MCNC: 100 MG/DL (ref 70–110)
HCT VFR BLD AUTO: 25.2 % (ref 37–48.5)
HGB BLD-MCNC: 7.4 G/DL (ref 12–16)
IMM GRANULOCYTES # BLD AUTO: 0.03 K/UL (ref 0–0.04)
IMM GRANULOCYTES NFR BLD AUTO: 0.6 % (ref 0–0.5)
INR PPP: 1.5
LACTATE SERPL-SCNC: 1.6 MMOL/L (ref 0.5–1.9)
LYMPHOCYTES # BLD AUTO: 0.5 K/UL (ref 1–4.8)
LYMPHOCYTES NFR BLD: 9.8 % (ref 18–48)
MCH RBC QN AUTO: 27.9 PG (ref 27–31)
MCHC RBC AUTO-ENTMCNC: 29.4 G/DL (ref 32–36)
MCV RBC AUTO: 95 FL (ref 82–98)
MONOCYTES # BLD AUTO: 0.6 K/UL (ref 0.3–1)
MONOCYTES NFR BLD: 11.9 % (ref 4–15)
NEUTROPHILS # BLD AUTO: 4 K/UL (ref 1.8–7.7)
NEUTROPHILS NFR BLD: 77.3 % (ref 38–73)
NRBC BLD-RTO: 1 /100 WBC
PHOSPHATE SERPL-MCNC: 6 MG/DL (ref 2.7–4.5)
PLATELET # BLD AUTO: 239 K/UL (ref 150–350)
PMV BLD AUTO: 11.6 FL (ref 9.2–12.9)
POTASSIUM SERPL-SCNC: 5.2 MMOL/L (ref 3.5–5.1)
PROT SERPL-MCNC: 7.5 G/DL (ref 6–8.4)
PROTHROMBIN TIME: 17.7 SEC (ref 10.6–14.8)
RBC # BLD AUTO: 2.65 M/UL (ref 4–5.4)
SARS-COV-2 RDRP RESP QL NAA+PROBE: NEGATIVE
SODIUM SERPL-SCNC: 133 MMOL/L (ref 136–145)
TSH SERPL DL<=0.005 MIU/L-ACNC: 3.92 UIU/ML (ref 0.34–5.6)
WBC # BLD AUTO: 5.2 K/UL (ref 3.9–12.7)

## 2020-07-07 PROCEDURE — 85025 COMPLETE CBC W/AUTO DIFF WBC: CPT

## 2020-07-07 PROCEDURE — 94760 N-INVAS EAR/PLS OXIMETRY 1: CPT

## 2020-07-07 PROCEDURE — 84443 ASSAY THYROID STIM HORMONE: CPT

## 2020-07-07 PROCEDURE — 96365 THER/PROPH/DIAG IV INF INIT: CPT

## 2020-07-07 PROCEDURE — 85610 PROTHROMBIN TIME: CPT

## 2020-07-07 PROCEDURE — 27000221 HC OXYGEN, UP TO 24 HOURS

## 2020-07-07 PROCEDURE — 25000003 PHARM REV CODE 250: Performed by: INTERNAL MEDICINE

## 2020-07-07 PROCEDURE — 99285 EMERGENCY DEPT VISIT HI MDM: CPT | Mod: 25

## 2020-07-07 PROCEDURE — 96374 THER/PROPH/DIAG INJ IV PUSH: CPT | Mod: 59

## 2020-07-07 PROCEDURE — 84100 ASSAY OF PHOSPHORUS: CPT

## 2020-07-07 PROCEDURE — 63600175 PHARM REV CODE 636 W HCPCS: Performed by: EMERGENCY MEDICINE

## 2020-07-07 PROCEDURE — 93005 ELECTROCARDIOGRAM TRACING: CPT | Performed by: INTERNAL MEDICINE

## 2020-07-07 PROCEDURE — 96375 TX/PRO/DX INJ NEW DRUG ADDON: CPT

## 2020-07-07 PROCEDURE — 63600175 PHARM REV CODE 636 W HCPCS: Performed by: NURSE PRACTITIONER

## 2020-07-07 PROCEDURE — G0378 HOSPITAL OBSERVATION PER HR: HCPCS

## 2020-07-07 PROCEDURE — 99900035 HC TECH TIME PER 15 MIN (STAT)

## 2020-07-07 PROCEDURE — U0002 COVID-19 LAB TEST NON-CDC: HCPCS

## 2020-07-07 PROCEDURE — 80053 COMPREHEN METABOLIC PANEL: CPT

## 2020-07-07 PROCEDURE — 36415 COLL VENOUS BLD VENIPUNCTURE: CPT

## 2020-07-07 PROCEDURE — 25000003 PHARM REV CODE 250: Performed by: NURSE PRACTITIONER

## 2020-07-07 PROCEDURE — 87040 BLOOD CULTURE FOR BACTERIA: CPT | Mod: 59

## 2020-07-07 PROCEDURE — 83605 ASSAY OF LACTIC ACID: CPT

## 2020-07-07 RX ORDER — ACETAMINOPHEN 325 MG/1
650 TABLET ORAL EVERY 4 HOURS PRN
Status: DISCONTINUED | OUTPATIENT
Start: 2020-07-07 | End: 2020-07-15 | Stop reason: HOSPADM

## 2020-07-07 RX ORDER — TRAMADOL HYDROCHLORIDE 50 MG/1
50 TABLET ORAL EVERY 8 HOURS PRN
Status: DISCONTINUED | OUTPATIENT
Start: 2020-07-07 | End: 2020-07-15 | Stop reason: HOSPADM

## 2020-07-07 RX ORDER — DILTIAZEM HYDROCHLORIDE 180 MG/1
180 CAPSULE, COATED, EXTENDED RELEASE ORAL DAILY
Status: DISCONTINUED | OUTPATIENT
Start: 2020-07-08 | End: 2020-07-15 | Stop reason: HOSPADM

## 2020-07-07 RX ORDER — CLINDAMYCIN HYDROCHLORIDE 300 MG/1
300 CAPSULE ORAL 3 TIMES DAILY
COMMUNITY
Start: 2020-06-23 | End: 2020-07-15

## 2020-07-07 RX ORDER — VANCOMYCIN HCL IN 5 % DEXTROSE 1G/250ML
1000 PLASTIC BAG, INJECTION (ML) INTRAVENOUS
Status: COMPLETED | OUTPATIENT
Start: 2020-07-07 | End: 2020-07-07

## 2020-07-07 RX ORDER — WARFARIN 3 MG/1
3 TABLET ORAL NIGHTLY
COMMUNITY
End: 2020-08-17

## 2020-07-07 RX ORDER — METOPROLOL TARTRATE 25 MG/1
25 TABLET, FILM COATED ORAL 2 TIMES DAILY
Status: DISCONTINUED | OUTPATIENT
Start: 2020-07-07 | End: 2020-07-15 | Stop reason: HOSPADM

## 2020-07-07 RX ORDER — SODIUM CHLORIDE 0.9 % (FLUSH) 0.9 %
10 SYRINGE (ML) INJECTION
Status: DISCONTINUED | OUTPATIENT
Start: 2020-07-07 | End: 2020-07-15 | Stop reason: HOSPADM

## 2020-07-07 RX ORDER — LANOLIN ALCOHOL/MO/W.PET/CERES
400 CREAM (GRAM) TOPICAL DAILY
Status: DISCONTINUED | OUTPATIENT
Start: 2020-07-08 | End: 2020-07-15 | Stop reason: HOSPADM

## 2020-07-07 RX ORDER — ISOSORBIDE MONONITRATE 30 MG/1
30 TABLET, EXTENDED RELEASE ORAL DAILY
Status: DISCONTINUED | OUTPATIENT
Start: 2020-07-08 | End: 2020-07-15 | Stop reason: HOSPADM

## 2020-07-07 RX ORDER — ALBUTEROL SULFATE 0.83 MG/ML
2.5 SOLUTION RESPIRATORY (INHALATION) EVERY 4 HOURS PRN
Status: DISCONTINUED | OUTPATIENT
Start: 2020-07-07 | End: 2020-07-15 | Stop reason: HOSPADM

## 2020-07-07 RX ORDER — MORPHINE SULFATE 4 MG/ML
4 INJECTION, SOLUTION INTRAMUSCULAR; INTRAVENOUS
Status: COMPLETED | OUTPATIENT
Start: 2020-07-07 | End: 2020-07-07

## 2020-07-07 RX ORDER — LOSARTAN POTASSIUM 25 MG/1
25 TABLET ORAL DAILY
Status: DISCONTINUED | OUTPATIENT
Start: 2020-07-08 | End: 2020-07-15 | Stop reason: HOSPADM

## 2020-07-07 RX ORDER — ONDANSETRON 2 MG/ML
4 INJECTION INTRAMUSCULAR; INTRAVENOUS EVERY 6 HOURS PRN
Status: DISCONTINUED | OUTPATIENT
Start: 2020-07-07 | End: 2020-07-15 | Stop reason: HOSPADM

## 2020-07-07 RX ORDER — PANTOPRAZOLE SODIUM 40 MG/1
40 TABLET, DELAYED RELEASE ORAL 2 TIMES DAILY
Status: DISCONTINUED | OUTPATIENT
Start: 2020-07-07 | End: 2020-07-15 | Stop reason: HOSPADM

## 2020-07-07 RX ORDER — AMOXICILLIN 250 MG
1 CAPSULE ORAL 2 TIMES DAILY PRN
Status: DISCONTINUED | OUTPATIENT
Start: 2020-07-07 | End: 2020-07-15 | Stop reason: HOSPADM

## 2020-07-07 RX ORDER — MORPHINE SULFATE 2 MG/ML
2 INJECTION, SOLUTION INTRAMUSCULAR; INTRAVENOUS EVERY 4 HOURS PRN
Status: DISCONTINUED | OUTPATIENT
Start: 2020-07-07 | End: 2020-07-15 | Stop reason: HOSPADM

## 2020-07-07 RX ORDER — WARFARIN 1 MG/1
3 TABLET ORAL DAILY
Status: DISCONTINUED | OUTPATIENT
Start: 2020-07-07 | End: 2020-07-08

## 2020-07-07 RX ORDER — CALCIUM ACETATE 667 MG/1
667 CAPSULE ORAL DAILY
Status: DISCONTINUED | OUTPATIENT
Start: 2020-07-08 | End: 2020-07-08

## 2020-07-07 RX ORDER — ASPIRIN 81 MG/1
81 TABLET ORAL DAILY
Status: DISCONTINUED | OUTPATIENT
Start: 2020-07-08 | End: 2020-07-15 | Stop reason: HOSPADM

## 2020-07-07 RX ORDER — ONDANSETRON 2 MG/ML
4 INJECTION INTRAMUSCULAR; INTRAVENOUS
Status: COMPLETED | OUTPATIENT
Start: 2020-07-07 | End: 2020-07-07

## 2020-07-07 RX ORDER — TRAMADOL HYDROCHLORIDE 50 MG/1
50 TABLET ORAL EVERY 8 HOURS PRN
Status: ON HOLD | COMMUNITY
End: 2020-07-15 | Stop reason: SDUPTHER

## 2020-07-07 RX ORDER — OXYCODONE AND ACETAMINOPHEN 5; 325 MG/1; MG/1
1-2 TABLET ORAL DAILY PRN
Status: ON HOLD | COMMUNITY
End: 2020-07-15 | Stop reason: SDUPTHER

## 2020-07-07 RX ORDER — OXYCODONE AND ACETAMINOPHEN 5; 325 MG/1; MG/1
1 TABLET ORAL EVERY 4 HOURS PRN
Status: DISCONTINUED | OUTPATIENT
Start: 2020-07-07 | End: 2020-07-15 | Stop reason: HOSPADM

## 2020-07-07 RX ORDER — PANTOPRAZOLE SODIUM 40 MG/1
40 TABLET, DELAYED RELEASE ORAL 2 TIMES DAILY
Status: ON HOLD | COMMUNITY
End: 2020-08-19 | Stop reason: SDUPTHER

## 2020-07-07 RX ORDER — ZINC SULFATE 50(220)MG
220 CAPSULE ORAL DAILY
Status: DISCONTINUED | OUTPATIENT
Start: 2020-07-08 | End: 2020-07-15 | Stop reason: HOSPADM

## 2020-07-07 RX ADMIN — TRAMADOL HYDROCHLORIDE 50 MG: 50 TABLET, FILM COATED ORAL at 08:07

## 2020-07-07 RX ADMIN — MORPHINE SULFATE 2 MG: 2 INJECTION, SOLUTION INTRAMUSCULAR; INTRAVENOUS at 11:07

## 2020-07-07 RX ADMIN — MORPHINE SULFATE 4 MG: 4 INJECTION INTRAVENOUS at 11:07

## 2020-07-07 RX ADMIN — METOPROLOL TARTRATE 25 MG: 25 TABLET, FILM COATED ORAL at 08:07

## 2020-07-07 RX ADMIN — PIPERACILLIN SODIUM AND TAZOBACTAM SODIUM 3.38 G: 3; .375 INJECTION, POWDER, LYOPHILIZED, FOR SOLUTION INTRAVENOUS at 11:07

## 2020-07-07 RX ADMIN — WARFARIN SODIUM 3 MG: 1 TABLET ORAL at 07:07

## 2020-07-07 RX ADMIN — ONDANSETRON 4 MG: 2 INJECTION INTRAMUSCULAR; INTRAVENOUS at 11:07

## 2020-07-07 RX ADMIN — COLLAGENASE SANTYL: 250 OINTMENT TOPICAL at 08:07

## 2020-07-07 RX ADMIN — VANCOMYCIN HYDROCHLORIDE 1000 MG: 1 INJECTION, POWDER, LYOPHILIZED, FOR SOLUTION INTRAVENOUS at 11:07

## 2020-07-07 RX ADMIN — MORPHINE SULFATE 2 MG: 2 INJECTION, SOLUTION INTRAMUSCULAR; INTRAVENOUS at 04:07

## 2020-07-07 NOTE — ED PROVIDER NOTES
Encounter Date: 7/7/2020       History     Chief Complaint   Patient presents with    Wound Infection     sent by wound care for wound on left leg     74-year-old female has a history of hypertension, mitral valve regurgitation, pancreatitis, peripheral vascular disease, pneumonia, chronic kidney disease on hemodialysis, anemia, CHF, gout who presents to the ED after being referred by wound care for a wound to her left medial thigh.  The patient denies any injury as a cause.  She is reportedly noncompliant with her medication and has refused to get an ultrasound ordered on the left leg.  She does continue to smoke 1/2 pack cigarettes daily.  No complaints of any foot pain.  Patient does ambulate with some assistance.  No other skin lesions otherwise noted.  The patient is a neuritic.  She was last dialyzed yesterday.  No complaint of any coughing or wheezing.  No shortness of breath.  No chest pain.        Review of patient's allergies indicates:  No Known Allergies  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    both legs    CHF (congestive heart failure)     Encounter for blood transfusion 03/2016    Gout     Hypertension     Mitral valve regurgitation     Osteoarthritis     Pancreatitis     Peripheral vascular disease     Peritoneal dialysis catheter in place     Pneumonia 09/09/2017    Renal failure      Past Surgical History:   Procedure Laterality Date    ACHILLES TENDON SURGERY Right     ANGIOGRAPHY OF ARTERIOVENOUS SHUNT Right 1/7/2020    Procedure: Fistulogram with Possible Intervention;  Surgeon: Joseph Loyola MD;  Location: [facility] CATH/EP LAB;  Service: Cardiology;  Laterality: Right;    ANGIOGRAPHY OF LOWER EXTREMITY Left 3/18/2020    Procedure: Angiogram Extremity Unilateral;  Surgeon: Ali Khoobehi, MD;  Location: [facility] CATH/EP LAB;  Service: Cardiology;  Laterality: Left;    APPENDECTOMY      CARDIAC CATHETERIZATION  07/03/2017    CHOLECYSTECTOMY       heal surgery Right     HYSTERECTOMY      INSERTION OF STENT INTO PERIPHERAL VESSEL N/A 1/7/2020    Procedure: INSERTION, STENT, VESSEL, PERIPHERAL;  Surgeon: Joseph Loyola MD;  Location: [facility] CATH/EP LAB;  Service: Cardiology;  Laterality: N/A;    PARATHYROIDECTOMY      PARATHYROIDECTOMY  07/13/2017    PERCUTANEOUS TRANSLUMINAL ANGIOPLASTY OF ARTERIOVENOUS FISTULA N/A 1/7/2020    Procedure: PTA, AV FISTULA;  Surgeon: Joseph Loyola MD;  Location: [facility] CATH/EP LAB;  Service: Cardiology;  Laterality: N/A;    PERITONEAL CATHETER INSERTION      RENAL BIOPSY      vocal cord nodule       Family History   Problem Relation Age of Onset    Heart disease Sister     Early death Sister         heart as baby    Heart disease Maternal Grandfather     Diabetes Mother     Hypertension Mother     Heart failure Mother     Heart disease Mother     Arthritis Mother     Diabetes Father     Early death Sister         infant     Social History     Tobacco Use    Smoking status: Current Some Day Smoker     Packs/day: 0.50     Years: 45.00     Pack years: 22.50     Types: Cigarettes    Smokeless tobacco: Never Used   Substance Use Topics    Alcohol use: No    Drug use: No     Review of Systems   Constitutional: Positive for activity change. Negative for chills, diaphoresis and fever.   HENT: Negative for congestion, facial swelling, nosebleeds, rhinorrhea, sinus pressure, sinus pain, sore throat and trouble swallowing.    Respiratory: Negative for cough, shortness of breath and wheezing.    Cardiovascular: Positive for leg swelling. Negative for chest pain.   Gastrointestinal: Negative for abdominal pain, nausea and vomiting.   Genitourinary:        Aneuric   Musculoskeletal: Negative for back pain.        Left medial thigh pain   Skin: Positive for wound.   Neurological: Negative for weakness.   Hematological: Does not bruise/bleed easily.       Physical Exam     Initial Vitals [07/07/20 1000]   BP Pulse Resp  Temp SpO2   123/66 (!) 118 18 98 °F (36.7 °C) 100 %      MAP       --         Physical Exam    Vitals reviewed.  Constitutional: She appears well-developed and well-nourished. She is not diaphoretic. No distress.   HENT:   Head: Normocephalic and atraumatic.   Nose: Nose normal.   Mouth/Throat: Oropharynx is clear and moist. No oropharyngeal exudate.   Eyes: Conjunctivae are normal. Pupils are equal, round, and reactive to light. Right eye exhibits no discharge. Left eye exhibits no discharge. No scleral icterus.   Neck: Normal range of motion. Neck supple. No thyromegaly present. No tracheal deviation present. No JVD present.   Cardiovascular: Normal heart sounds and intact distal pulses. Exam reveals no gallop and no friction rub.    No murmur heard.  Irregular irregular tachycardic rhythm   Pulmonary/Chest: Breath sounds normal. No stridor. No respiratory distress. She has no wheezes. She has no rhonchi. She has no rales. She exhibits no tenderness.   Abdominal: Soft. Bowel sounds are normal. She exhibits no distension. There is no abdominal tenderness. There is no rebound and no guarding.   Musculoskeletal: Normal range of motion. No tenderness or edema.   Lymphadenopathy:     She has no cervical adenopathy.   Neurological: She is alert and oriented to person, place, and time. She has normal strength. She displays normal reflexes. No cranial nerve deficit or sensory deficit. GCS score is 15. GCS eye subscore is 4. GCS verbal subscore is 5. GCS motor subscore is 6.   Skin: Skin is warm and dry. Capillary refill takes less than 2 seconds. No rash noted. No erythema. No pallor.   Stage III decubitus left medial thigh with surrounding tenderness.  No erythema.  The leg hyperpigmented skin.  Peripheral pulses are not palpable.   Psychiatric: Thought content normal.         ED Course   Procedures  Labs Reviewed   CULTURE, BLOOD   CULTURE, BLOOD   CBC W/ AUTO DIFFERENTIAL   COMPREHENSIVE METABOLIC PANEL   TSH   LACTIC  ACID, PLASMA          Imaging Results    None                       Attending Attestation:             Attending ED Notes:   74-year-old female who presented with a nonhealing wound to her left medial thigh has a chest x-ray showing some mild pulmonary vascular congestion.  She does in fact have renal failure on hemodialysis with the last being yesterday.  The patient's labs showed a stable H&H of 7.4 and 25.2.  White count is normal.  Potassium is 5.2.  BUN and creatinine is 375.3.  The remaining labs otherwise reviewed.  During the ED course patient was started on vancomycin and Zosyn.  Hospital Medicine is consulted the patient will be admitted for further wound care.  Of note the patient still smoking and has been noncompliant with medication therefore she may persistently have trouble with wound healing.                        Clinical Impression:       ICD-10-CM ICD-9-CM   1. Wound infection  T14.8XXA 958.3    L08.9    2. Chronic kidney disease  N18.9 585.9                                Kenji Sanford Jr., MD  07/07/20 0395

## 2020-07-07 NOTE — H&P
[facility] Medicine History & Physical Examination   Patient Name: Griselda Neely  MRN: [id number]  Patient Class: OP- Observation   Admission Date: 7/7/2020 10:03 AM  Length of Stay: 0  Attending Physician: Homer Carlson MD  Primary Care Provider: Kalie Neely MD  Face-to-Face encounter date: 07/07/2020  Code Status: full code  Chief Complaint: Wound Infection (sent by wound care for wound on left leg)        Patient information was obtained from patient, past medical records and ER records.   HISTORY OF PRESENT ILLNESS:   Griselda Neely is a 74 y.o. Black or  female who  has a past medical history of Anemia, Calciphylaxis (07/2017), CHF (congestive heart failure), Encounter for blood transfusion (03/2016), Gout, Hypertension, Mitral valve regurgitation, Osteoarthritis, Pancreatitis, Peripheral vascular disease, Peritoneal dialysis catheter in place, Pneumonia (09/09/2017), and Renal failure.. The patient presented to [facility] on 7/7/2020 with a primary complaint of Wound Infection (sent by wound care for wound on left leg)    History was obtained from the patient and the family present at the bedside and ER physician Sign-out. Patient is referred to the ED by wound care for wound to left medial thigh. The patient reports she has been treated outpatient with antibiotics and she has been having worsening pain to her left leg. She was seen by wound care today and was instructed to come to the ED for further evaluation. She has a history of noncompliance and refuses to have U/S of legs in the ED.  The patient has a history of ESRD on HD. She had her last HD on yesterday. She denies fever, chills, chest pain, sob, vomiting, abdominal pain, diarrhea, dizziness or LOC.     In the ED, VSS. CBC shows anemia that is chronic. H/H stable at baseline. She denies black or bloody stools. CMP was unremarkable.  EKG shows A-fib with controlled rate. No new signs  of ischemia. CXR shows cardiomegaly and mild pulmonary edema with trace bilateral pleural effusions. Blood cultures were drawn and the patient was started on IV antibiotics.     Decision to admit was taken and patient was informed about the plan of care.   REVIEW OF SYSTEMS:   10 Point Review of System was performed and was found to be negative except for that mentioned already in the HPI above.     PAST MEDICAL HISTORY:     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    both legs    CHF (congestive heart failure)     Encounter for blood transfusion 03/2016    Gout     Hypertension     Mitral valve regurgitation     Osteoarthritis     Pancreatitis     Peripheral vascular disease     Peritoneal dialysis catheter in place     Pneumonia 09/09/2017    Renal failure        PAST SURGICAL HISTORY:     Past Surgical History:   Procedure Laterality Date    ACHILLES TENDON SURGERY Right     ANGIOGRAPHY OF ARTERIOVENOUS SHUNT Right 1/7/2020    Procedure: Fistulogram with Possible Intervention;  Surgeon: Joseph Loyola MD;  Location: [facility] CATH/EP LAB;  Service: Cardiology;  Laterality: Right;    ANGIOGRAPHY OF LOWER EXTREMITY Left 3/18/2020    Procedure: Angiogram Extremity Unilateral;  Surgeon: Ali Khoobehi, MD;  Location: [facility] CATH/EP LAB;  Service: Cardiology;  Laterality: Left;    APPENDECTOMY      CARDIAC CATHETERIZATION  07/03/2017    CHOLECYSTECTOMY      heal surgery Right     HYSTERECTOMY      INSERTION OF STENT INTO PERIPHERAL VESSEL N/A 1/7/2020    Procedure: INSERTION, STENT, VESSEL, PERIPHERAL;  Surgeon: Joseph Loyola MD;  Location: [facility] CATH/EP LAB;  Service: Cardiology;  Laterality: N/A;    PARATHYROIDECTOMY      PARATHYROIDECTOMY  07/13/2017    PERCUTANEOUS TRANSLUMINAL ANGIOPLASTY OF ARTERIOVENOUS FISTULA N/A 1/7/2020    Procedure: PTA, AV FISTULA;  Surgeon: Joseph Loyola MD;  Location: [facility] CATH/EP LAB;  Service: Cardiology;  Laterality: N/A;     PERITONEAL CATHETER INSERTION      RENAL BIOPSY      vocal cord nodule         ALLERGIES:   Patient has no known allergies.    FAMILY HISTORY:     Family History   Problem Relation Age of Onset    Heart disease Sister     Early death Sister         heart as baby    Heart disease Maternal Grandfather     Diabetes Mother     Hypertension Mother     Heart failure Mother     Heart disease Mother     Arthritis Mother     Diabetes Father     Early death Sister         infant       SOCIAL HISTORY:     Social History     Tobacco Use    Smoking status: Current Some Day Smoker     Packs/day: 0.50     Years: 45.00     Pack years: 22.50     Types: Cigarettes    Smokeless tobacco: Never Used   Substance Use Topics    Alcohol use: No        Social History     Substance and Sexual Activity   Sexual Activity Not on file        HOME MEDICATIONS:     Prior to Admission medications    Medication Sig Start Date End Date Taking? Authorizing Provider   aspirin (ECOTRIN) 81 MG EC tablet Take 81 mg by mouth once daily.   Yes Historical Provider, MD   calcium acetate (PHOSLO) 667 mg capsule Take 667 mg by mouth once daily.    Yes Historical Provider, MD   diltiaZEM (CARDIZEM CD) 180 MG 24 hr capsule Take 180 mg by mouth once daily.   Yes Historical Provider, MD   folic acid/vit B complex and C (JENNIFER-KODAK ORAL) Take 1 tablet by mouth once daily.   Yes Historical Provider, MD   isosorbide mononitrate (IMDUR) 30 MG 24 hr tablet Take 30 mg by mouth once daily.   Yes Historical Provider, MD   losartan (COZAAR) 25 MG tablet Take 25 mg by mouth once daily.   Yes Historical Provider, MD   magnesium oxide (MAG-OX) 400 mg (241.3 mg magnesium) tablet Take 400 mg by mouth once daily.   Yes Historical Provider, MD   metoprolol tartrate (LOPRESSOR) 25 MG tablet Take 25 mg by mouth 2 (two) times daily.   Yes Historical Provider, MD   oxyCODONE-acetaminophen (PERCOCET) 5-325 mg per tablet Take 1-2 tablets by mouth daily as needed for Pain  "(on dialysis days).   Yes Historical Provider, MD   traMADoL (ULTRAM) 50 mg tablet Take 50 mg by mouth every 8 (eight) hours as needed for Pain.   Yes Historical Provider, MD   warfarin (COUMADIN) 3 MG tablet Take 3 mg by mouth every evening.   Yes Historical Provider, MD   zinc sulfate 220 mg Tab tablet Take 220 mg by mouth every other day.   Yes Historical Provider, MD   mupirocin (BACTROBAN) 2 % ointment Apply topically 3 (three) times daily.  Patient taking differently: Apply 1 g topically 3 (three) times daily.  2/19/20   Kalie Neely MD   pantoprazole (PROTONIX) 40 MG tablet Take 40 mg by mouth 2 (two) times daily.    Historical Provider, MD   warfarin (COUMADIN) 4 MG tablet Take 4 mg by mouth every evening.  7/7/20  Historical Provider, MD         PHYSICAL EXAM:   /72   Pulse 88   Temp 98 °F (36.7 °C) (Temporal)   Resp 18   Ht 5' 5" (1.651 m)   Wt 56.7 kg (125 lb)   LMP  (LMP Unknown)   SpO2 96%   BMI 20.80 kg/m²   Vitals Reviewed  General appearance: Well-developed, cachectic Black or  female in no apparent distress.  Skin: No Rash. Wound to left medial thigh with tenderness to surrounding tissue.    Neuro: Motor and sensory exams grossly intact. Good tone. Power in all 4 extremities 5/5.   HENT: Atraumatic head. Moist mucous membranes of oral cavity.  Eyes: Normal extraocular movements. PERRLA  Neck: Supple. No evidence of lymphadenopathy. No thyroidomegaly.  Lungs: Clear to auscultation bilaterally. No wheezing present.   Heart: Irregularly irregular rhythm. S1 and S2 present with no murmurs/gallop/rub. No pedal edema. No JVD present.   Abdomen: Soft, non-distended, non-tender. No rebound tenderness/guarding. No masses or organomegaly. Bowel sounds are normal. Bladder is not palpable.   Extremities: full ROM. Hyperpigmentation to BLE. Weak pulses to lower extremities  Psych/mental status: Alert and oriented. Flat mood and affect. Uncooperative. Responds appropriately " to questions.   EMERGENCY DEPARTMENT LABS AND IMAGING:     Labs Reviewed   CBC W/ AUTO DIFFERENTIAL - Abnormal; Notable for the following components:       Result Value    RBC 2.65 (*)     Hemoglobin 7.4 (*)     Hematocrit 25.2 (*)     Mean Corpuscular Hemoglobin Conc 29.4 (*)     RDW 19.2 (*)     Immature Granulocytes 0.6 (*)     Lymph # 0.5 (*)     nRBC 1 (*)     Gran% 77.3 (*)     Lymph% 9.8 (*)     All other components within normal limits   COMPREHENSIVE METABOLIC PANEL - Abnormal; Notable for the following components:    Sodium 133 (*)     Potassium 5.2 (*)     Chloride 94 (*)     BUN, Bld 37 (*)     Creatinine 5.3 (*)     Calcium 8.4 (*)     Albumin 3.0 (*)     Total Bilirubin 1.3 (*)     eGFR if  8.6 (*)     eGFR if non  7.5 (*)     All other components within normal limits   PROTIME-INR - Abnormal; Notable for the following components:    PT 17.7 (*)     All other components within normal limits   CULTURE, BLOOD   CULTURE, BLOOD   TSH   LACTIC ACID, PLASMA   SARS-COV-2 RNA AMPLIFICATION, QUAL       X-Ray Chest AP Portable   Final Result          ASSESSMENT & PLAN:   Calciphylaxis:  Chronic wound to left medial thigh; acute on chronic pain is concerning for secondary bacterial infection  Admit to Med-tele  Consult wound care  Continue IV Zosyn/Vanc  Blood and wound cultures pending  Prn analgesics  Refused to have ultrasound of legs  AM labs to include phosphorus level    ESRD on HD:  Consult Dr. Ingram for HD MWF  No respiratory distress at this time  CXR with mild pulmonary edema    Anemia:  Chronic; H/H stable at baseline  No signs of acute bleeding at this time; denies black or bloody stools  Follow h/h    Persistent A-fib:  Controlled rate at this time  Continue metoprolol and coumadin (INR 1.5 today)  Daily PT/INR    DVT Prophylaxis: Continue coumadin for DVT prophylaxis and will be advised to be as mobile as possible and sit in a chair as tolerated.    ________________________________________________________________________________    Discharge Planning and Disposition: No mobility needs. Ambulating well with assistance. Patient will be discharged in 1-2 days  Face-to-Face encounter date: 07/07/2020  Encounter included review of the medical records, interviewing and examining the patient face-to-face, discussion with family and other health care providers including emergency medicine physician, admission orders, interpreting lab/test results and formulating a plan of care.   Medical Decision Making during this encounter was  [_] Low Complexity  [x_] Moderate Complexity  [  ] High Complexity  _________________________________________________________________________________    INPATIENT LIST OF MEDICATIONS     Current Facility-Administered Medications:     acetaminophen tablet 650 mg, 650 mg, Oral, Q4H PRN, Rebeca Blair NP    albuterol nebulizer solution 2.5 mg, 2.5 mg, Nebulization, Q4H PRN, Rebeca Blair NP    aspirin EC tablet 81 mg, 81 mg, Oral, Daily, Rebeca Blair NP    calcium acetate(phosphat bind) capsule 667 mg, 667 mg, Oral, Daily, Rebeca Blair NP    diltiaZEM 24 hr capsule 180 mg, 180 mg, Oral, Daily, Rebeca Blair NP    isosorbide mononitrate 24 hr tablet 30 mg, 30 mg, Oral, Daily, Rebeca Blair NP    losartan tablet 25 mg, 25 mg, Oral, Daily, Rebeca Bliar NP    magnesium oxide tablet 400 mg, 400 mg, Oral, Daily, Rebeca Blair NP    metoprolol tartrate (LOPRESSOR) tablet 25 mg, 25 mg, Oral, BID, Rebeca Blair NP    morphine injection 2 mg, 2 mg, Intravenous, Q4H PRN, Rebeca Blair NP    ondansetron injection 4 mg, 4 mg, Intravenous, Q6H PRN, Rebeca Blair NP    oxyCODONE-acetaminophen 5-325 mg per tablet 1 tablet, 1 tablet, Oral, Q4H PRN, Rebeca Blair NP    pantoprazole EC tablet 40 mg, 40 mg, Oral, BID, Rebeca Blair NP    senna-docusate 8.6-50 mg per tablet 1 tablet,  1 tablet, Oral, BID PRN, Rebeca Blair NP    sodium chloride 0.9% flush 10 mL, 10 mL, Intravenous, PRN, Rebeca Blair NP    traMADoL tablet 50 mg, 50 mg, Oral, Q8H PRN, Rebeca Blair NP    Pharmacy to dose Vancomycin consult, , , Once **AND** vancomycin - pharmacy to dose, , Intravenous, pharmacy to manage frequency, Kenji Sanford Jr., MD    warfarin (COUMADIN) tablet 3 mg, 3 mg, Oral, Daily, Rebeca Blair NP    zinc sulfate capsule 220 mg, 220 mg, Oral, Daily, Rebeca Blair NP    Current Outpatient Medications:     aspirin (ECOTRIN) 81 MG EC tablet, Take 81 mg by mouth once daily., Disp: , Rfl:     calcium acetate (PHOSLO) 667 mg capsule, Take 667 mg by mouth once daily. , Disp: , Rfl:     diltiaZEM (CARDIZEM CD) 180 MG 24 hr capsule, Take 180 mg by mouth once daily., Disp: , Rfl:     folic acid/vit B complex and C (JENNIFER-KODAK ORAL), Take 1 tablet by mouth once daily., Disp: , Rfl:     isosorbide mononitrate (IMDUR) 30 MG 24 hr tablet, Take 30 mg by mouth once daily., Disp: , Rfl:     losartan (COZAAR) 25 MG tablet, Take 25 mg by mouth once daily., Disp: , Rfl:     magnesium oxide (MAG-OX) 400 mg (241.3 mg magnesium) tablet, Take 400 mg by mouth once daily., Disp: , Rfl:     metoprolol tartrate (LOPRESSOR) 25 MG tablet, Take 25 mg by mouth 2 (two) times daily., Disp: , Rfl:     oxyCODONE-acetaminophen (PERCOCET) 5-325 mg per tablet, Take 1-2 tablets by mouth daily as needed for Pain (on dialysis days)., Disp: , Rfl:     traMADoL (ULTRAM) 50 mg tablet, Take 50 mg by mouth every 8 (eight) hours as needed for Pain., Disp: , Rfl:     warfarin (COUMADIN) 3 MG tablet, Take 3 mg by mouth every evening., Disp: , Rfl:     zinc sulfate 220 mg Tab tablet, Take 220 mg by mouth every other day., Disp: , Rfl:     mupirocin (BACTROBAN) 2 % ointment, Apply topically 3 (three) times daily. (Patient taking differently: Apply 1 g topically 3 (three) times daily. ), Disp: 30 g, Rfl: 2     pantoprazole (PROTONIX) 40 MG tablet, Take 40 mg by mouth 2 (two) times daily., Disp: , Rfl:       Scheduled Meds:   aspirin  81 mg Oral Daily    calcium acetate(phosphat bind)  667 mg Oral Daily    diltiaZEM  180 mg Oral Daily    isosorbide mononitrate  30 mg Oral Daily    losartan  25 mg Oral Daily    magnesium oxide  400 mg Oral Daily    metoprolol tartrate  25 mg Oral BID    pantoprazole  40 mg Oral BID    warfarin  3 mg Oral Daily    zinc sulfate  220 mg Oral Daily     Continuous Infusions:  PRN Meds:.acetaminophen, albuterol sulfate, morphine, ondansetron, oxyCODONE-acetaminophen, senna-docusate 8.6-50 mg, sodium chloride 0.9%, traMADoL, Pharmacy to dose Vancomycin consult **AND** vancomycin - pharmacy to dose      Rebeca Blair  [facility] Hospitalist  07/07/2020

## 2020-07-07 NOTE — ED NOTES
Pt resting quietly in bed antibiotic given for wound to l medial thigh, pt reports decreased pain.

## 2020-07-07 NOTE — CONSULTS
Left inner thigh wound, 10.2x5cm tan slough 50% pale pink wound bed%.  Very painful.  Refused any cleaning or redressing at this time.  Called and got orders for Santyl daily.

## 2020-07-07 NOTE — PROGRESS NOTES
Pharmacokinetic Initial Assessment: IV Vancomycin    Assessment/Plan:    Initiate intravenous vancomycin with loading dose of 1000 mg once with subsequent doses when random concentrations are less than 20 mcg/mL  Desired empiric serum trough concentration is 10 to 15 mcg/mL  Draw vancomycin random level on 7/8/20 at 11:00.  Pharmacy will continue to follow and monitor vancomycin.      Please contact pharmacy at extension [phone] with any questions regarding this assessment.     Thank you for the consult,   Suly Cerna       Patient brief summary:  Griselda Neely is a 74 y.o. female initiated on antimicrobial therapy with IV Vancomycin for treatment of suspected skin & soft tissue infection    Drug Allergies:   Review of patient's allergies indicates:  No Known Allergies    Actual Body Weight:   57 kg    Renal Function:   Estimated Creatinine Clearance: 8.5 mL/min (A) (based on SCr of 5.3 mg/dL (H)).,     Dialysis Method (if applicable):  intermittent HD    CBC (last 72 hours):  Recent Labs   Lab Result Units 07/07/20  1040   WBC K/uL 5.20   Hemoglobin g/dL 7.4*   Hematocrit % 25.2*   Platelets K/uL 239   Gran% % 77.3*   Lymph% % 9.8*   Mono% % 11.9   Eosinophil% % 0.4   Basophil% % 0.0   Differential Method  Automated       Metabolic Panel (last 72 hours):  Recent Labs   Lab Result Units 07/07/20  1040   Sodium mmol/L 133*   Potassium mmol/L 5.2*   Chloride mmol/L 94*   CO2 mmol/L 27   Glucose mg/dL 100   BUN, Bld mg/dL 37*   Creatinine mg/dL 5.3*   Albumin g/dL 3.0*   Total Bilirubin mg/dL 1.3*   Alkaline Phosphatase U/L 133   AST U/L 38   ALT U/L 20       Drug levels (last 3 results):  No results for input(s): VANCOMYCINRA, VANCOMYCINPE, VANCOMYCINTR in the last 72 hours.    Microbiologic Results:  Microbiology Results (last 7 days)       Procedure Component Value Units Date/Time    Aerobic culture [[id number]]     Order Status: No result Specimen: Wound from Leg, Left     Blood culture #2 **CANNOT BE ORDERED  STAT** [[id number]] Collected: 07/07/20 1130    Order Status: Sent Specimen: Blood from Peripheral, Wrist, Left Updated: 07/07/20 1152    Blood culture #1 **CANNOT BE ORDERED STAT** [[id number]] Collected: 07/07/20 1112    Order Status: Sent Specimen: Blood from Peripheral, Hand, Left Updated: 07/07/20 1117

## 2020-07-08 ENCOUNTER — DOCUMENT SCAN (OUTPATIENT)
Dept: HOME HEALTH SERVICES | Facility: HOSPITAL | Age: 75
End: 2020-07-08

## 2020-07-08 LAB
ALBUMIN SERPL BCP-MCNC: 2.7 G/DL (ref 3.5–5.2)
ALP SERPL-CCNC: 110 U/L (ref 55–135)
ALT SERPL W/O P-5'-P-CCNC: 17 U/L (ref 10–44)
ANION GAP SERPL CALC-SCNC: 15 MMOL/L (ref 8–16)
AST SERPL-CCNC: 26 U/L (ref 10–40)
BASOPHILS # BLD AUTO: 0.01 K/UL (ref 0–0.2)
BASOPHILS NFR BLD: 0.2 % (ref 0–1.9)
BILIRUB SERPL-MCNC: 0.9 MG/DL (ref 0.1–1)
BUN SERPL-MCNC: 42 MG/DL (ref 8–23)
CALCIUM SERPL-MCNC: 8.2 MG/DL (ref 8.7–10.5)
CHLORIDE SERPL-SCNC: 96 MMOL/L (ref 95–110)
CO2 SERPL-SCNC: 26 MMOL/L (ref 23–29)
CREAT SERPL-MCNC: 6.1 MG/DL (ref 0.5–1.4)
DIFFERENTIAL METHOD: ABNORMAL
EOSINOPHIL # BLD AUTO: 0.1 K/UL (ref 0–0.5)
EOSINOPHIL NFR BLD: 1.1 % (ref 0–8)
ERYTHROCYTE [DISTWIDTH] IN BLOOD BY AUTOMATED COUNT: 19.2 % (ref 11.5–14.5)
EST. GFR  (AFRICAN AMERICAN): 7.3 ML/MIN/1.73 M^2
EST. GFR  (NON AFRICAN AMERICAN): 6.3 ML/MIN/1.73 M^2
GLUCOSE SERPL-MCNC: 56 MG/DL (ref 70–110)
HCT VFR BLD AUTO: 27.6 % (ref 37–48.5)
HGB BLD-MCNC: 8.1 G/DL (ref 12–16)
IMM GRANULOCYTES # BLD AUTO: 0.04 K/UL (ref 0–0.04)
IMM GRANULOCYTES NFR BLD AUTO: 0.8 % (ref 0–0.5)
INR PPP: 1.5
LYMPHOCYTES # BLD AUTO: 0.8 K/UL (ref 1–4.8)
LYMPHOCYTES NFR BLD: 14.3 % (ref 18–48)
MAGNESIUM SERPL-MCNC: 2.5 MG/DL (ref 1.6–2.6)
MCH RBC QN AUTO: 27.6 PG (ref 27–31)
MCHC RBC AUTO-ENTMCNC: 29.3 G/DL (ref 32–36)
MCV RBC AUTO: 94 FL (ref 82–98)
MONOCYTES # BLD AUTO: 0.7 K/UL (ref 0.3–1)
MONOCYTES NFR BLD: 13.5 % (ref 4–15)
NEUTROPHILS # BLD AUTO: 3.7 K/UL (ref 1.8–7.7)
NEUTROPHILS NFR BLD: 70.1 % (ref 38–73)
NRBC BLD-RTO: 2 /100 WBC
PHOSPHATE SERPL-MCNC: 6.3 MG/DL (ref 2.7–4.5)
PLATELET # BLD AUTO: 202 K/UL (ref 150–350)
PMV BLD AUTO: 11.7 FL (ref 9.2–12.9)
POTASSIUM SERPL-SCNC: 5.6 MMOL/L (ref 3.5–5.1)
PROT SERPL-MCNC: 6.9 G/DL (ref 6–8.4)
PROTHROMBIN TIME: 17.5 SEC (ref 10.6–14.8)
RBC # BLD AUTO: 2.94 M/UL (ref 4–5.4)
SODIUM SERPL-SCNC: 137 MMOL/L (ref 136–145)
VANCOMYCIN SERPL-MCNC: 9.1 UG/ML
WBC # BLD AUTO: 5.26 K/UL (ref 3.9–12.7)

## 2020-07-08 PROCEDURE — 94761 N-INVAS EAR/PLS OXIMETRY MLT: CPT

## 2020-07-08 PROCEDURE — 83735 ASSAY OF MAGNESIUM: CPT

## 2020-07-08 PROCEDURE — 99900035 HC TECH TIME PER 15 MIN (STAT)

## 2020-07-08 PROCEDURE — 25000003 PHARM REV CODE 250: Performed by: NURSE PRACTITIONER

## 2020-07-08 PROCEDURE — 63600175 PHARM REV CODE 636 W HCPCS: Performed by: NURSE PRACTITIONER

## 2020-07-08 PROCEDURE — 25000003 PHARM REV CODE 250: Performed by: INTERNAL MEDICINE

## 2020-07-08 PROCEDURE — 36415 COLL VENOUS BLD VENIPUNCTURE: CPT

## 2020-07-08 PROCEDURE — 84100 ASSAY OF PHOSPHORUS: CPT

## 2020-07-08 PROCEDURE — 85025 COMPLETE CBC W/AUTO DIFF WBC: CPT

## 2020-07-08 PROCEDURE — 27000221 HC OXYGEN, UP TO 24 HOURS

## 2020-07-08 PROCEDURE — 12000002 HC ACUTE/MED SURGE SEMI-PRIVATE ROOM

## 2020-07-08 PROCEDURE — 80202 ASSAY OF VANCOMYCIN: CPT

## 2020-07-08 PROCEDURE — 90935 HEMODIALYSIS ONE EVALUATION: CPT

## 2020-07-08 PROCEDURE — 85610 PROTHROMBIN TIME: CPT

## 2020-07-08 PROCEDURE — 80053 COMPREHEN METABOLIC PANEL: CPT

## 2020-07-08 RX ORDER — SEVELAMER CARBONATE 800 MG/1
1600 TABLET, FILM COATED ORAL
Status: DISCONTINUED | OUTPATIENT
Start: 2020-07-08 | End: 2020-07-15 | Stop reason: HOSPADM

## 2020-07-08 RX ORDER — VANCOMYCIN HCL IN 5 % DEXTROSE 1G/250ML
1000 PLASTIC BAG, INJECTION (ML) INTRAVENOUS ONCE
Status: COMPLETED | OUTPATIENT
Start: 2020-07-08 | End: 2020-07-08

## 2020-07-08 RX ORDER — SODIUM THIOSULFATE 250 MG/ML
9 INJECTION, SOLUTION INTRAVENOUS ONCE
Status: COMPLETED | OUTPATIENT
Start: 2020-07-08 | End: 2020-07-08

## 2020-07-08 RX ORDER — SODIUM CHLORIDE 9 MG/ML
INJECTION, SOLUTION INTRAVENOUS
Status: DISCONTINUED | OUTPATIENT
Start: 2020-07-08 | End: 2020-07-15 | Stop reason: HOSPADM

## 2020-07-08 RX ORDER — ENOXAPARIN SODIUM 100 MG/ML
40 INJECTION SUBCUTANEOUS EVERY 24 HOURS
Status: DISCONTINUED | OUTPATIENT
Start: 2020-07-08 | End: 2020-07-08

## 2020-07-08 RX ORDER — SODIUM CHLORIDE 9 MG/ML
INJECTION, SOLUTION INTRAVENOUS ONCE
Status: COMPLETED | OUTPATIENT
Start: 2020-07-08 | End: 2020-07-13

## 2020-07-08 RX ORDER — ENOXAPARIN SODIUM 100 MG/ML
30 INJECTION SUBCUTANEOUS EVERY 24 HOURS
Status: DISCONTINUED | OUTPATIENT
Start: 2020-07-08 | End: 2020-07-12

## 2020-07-08 RX ADMIN — ZINC SULFATE 220 MG (50 MG) CAPSULE 220 MG: CAPSULE at 01:07

## 2020-07-08 RX ADMIN — DILTIAZEM HYDROCHLORIDE 180 MG: 180 CAPSULE, COATED, EXTENDED RELEASE ORAL at 01:07

## 2020-07-08 RX ADMIN — SEVELAMER CARBONATE 1600 MG: 800 TABLET, FILM COATED ORAL at 05:07

## 2020-07-08 RX ADMIN — TRAMADOL HYDROCHLORIDE 50 MG: 50 TABLET, FILM COATED ORAL at 08:07

## 2020-07-08 RX ADMIN — MORPHINE SULFATE 2 MG: 2 INJECTION, SOLUTION INTRAMUSCULAR; INTRAVENOUS at 05:07

## 2020-07-08 RX ADMIN — CALCIUM ACETATE 667 MG: 667 CAPSULE ORAL at 09:07

## 2020-07-08 RX ADMIN — METOPROLOL TARTRATE 25 MG: 25 TABLET, FILM COATED ORAL at 08:07

## 2020-07-08 RX ADMIN — SODIUM THIOSULFATE 14.57 G: 250 INJECTION, SOLUTION INTRAVENOUS at 11:07

## 2020-07-08 RX ADMIN — ISOSORBIDE MONONITRATE 30 MG: 30 TABLET, EXTENDED RELEASE ORAL at 01:07

## 2020-07-08 RX ADMIN — PANTOPRAZOLE SODIUM 40 MG: 40 TABLET, DELAYED RELEASE ORAL at 05:07

## 2020-07-08 RX ADMIN — ASPIRIN 81 MG: 81 TABLET, DELAYED RELEASE ORAL at 01:07

## 2020-07-08 RX ADMIN — VANCOMYCIN HYDROCHLORIDE 1000 MG: 1 INJECTION, POWDER, LYOPHILIZED, FOR SOLUTION INTRAVENOUS at 03:07

## 2020-07-08 RX ADMIN — WARFARIN SODIUM 3 MG: 1 TABLET ORAL at 05:07

## 2020-07-08 RX ADMIN — MORPHINE SULFATE 2 MG: 2 INJECTION, SOLUTION INTRAMUSCULAR; INTRAVENOUS at 01:07

## 2020-07-08 RX ADMIN — PIPERACILLIN SODIUM AND TAZOBACTAM SODIUM 3.38 G: 3; .375 INJECTION, POWDER, LYOPHILIZED, FOR SOLUTION INTRAVENOUS at 01:07

## 2020-07-08 RX ADMIN — SEVELAMER CARBONATE 1600 MG: 800 TABLET, FILM COATED ORAL at 01:07

## 2020-07-08 RX ADMIN — MAGNESIUM OXIDE 400 MG: 400 TABLET ORAL at 01:07

## 2020-07-08 RX ADMIN — LOSARTAN POTASSIUM 25 MG: 25 TABLET, FILM COATED ORAL at 01:07

## 2020-07-08 RX ADMIN — METOPROLOL TARTRATE 25 MG: 25 TABLET, FILM COATED ORAL at 01:07

## 2020-07-08 NOTE — PLAN OF CARE
Problem: Adult Inpatient Plan of Care  Goal: Patient-Specific Goal (Individualization)  Outcome: Ongoing, Progressing     Problem: Skin Injury Risk Increased  Goal: Skin Health and Integrity  Outcome: Ongoing, Progressing     Problem: Fall Injury Risk  Goal: Absence of Fall and Fall-Related Injury  Outcome: Ongoing, Progressing

## 2020-07-08 NOTE — CONSULTS
Consult Note  Nephrology    Griselda Neely  female  74 y.o.  Consult Requested By: Yang Royal MD  Reason for Consult:  ESRD, calciphylaxis    SUBJECTIVE:     History of Present Illness:  Mrs. Monika Neely is a 74-year-old woman who has multiple medical problems including ESRD for which we follow her here in [place].  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  She was referred to the hospital by wound care for more aggressive care.  She denies any fevers or chills.  Despite encouragement she has been reluctant to go to a long-term hospital for further care despite several hospitalizations.  She also has congestive heart failure and COPD but is having no symptoms referable to that at this time.  In terms of her renal disease she gets dialysis MWF at [facility] and her last treatment was a day prior to hospitalization.  She is on dialysis now and having no problems other than the pain in her inner thigh which is worse when she is on dialysis.  She is anemic but gives no symptoms of blood loss.  Nephrology has been consulted to assist with her ESRD management.    Review Of Systems:  GEN:  Decreased intake because of nausea and vomiting.  No fever, chills, night sweats.  HEENT: No blurry vision, glasses, floaters, hearing defects, sore throat.  CV:  Dyspnea on exertion.  No CP, palpitations, edema, orthopnea, PND.  PULM:  No Cough, SOB, hemoptysis, wheezing.  GI:  Nausea, vomiting postprandial.  No constipation, diarrhea, melena, hematochezia, abdominal pain.  :  Anuric.  NEURO: No LOC, seizure activity, dizziness.  SKIN:  Left inner thigh wound.  MS:  No arthralgias, joint swelling, redness or warmth.  PSYCH: Anxious regarding poor wound healing and pain.    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    both legs    CHF (congestive heart failure)      Encounter for blood transfusion 03/2016    Gout     Hypertension     Mitral valve regurgitation     Osteoarthritis     Pancreatitis     Peripheral vascular disease     Peritoneal dialysis catheter in place     Pneumonia 09/09/2017    Renal failure      Past Surgical History:   Procedure Laterality Date    ACHILLES TENDON SURGERY Right     ANGIOGRAPHY OF ARTERIOVENOUS SHUNT Right 1/7/2020    Procedure: Fistulogram with Possible Intervention;  Surgeon: Joseph Loyola MD;  Location: [facility] CATH/EP LAB;  Service: Cardiology;  Laterality: Right;    ANGIOGRAPHY OF LOWER EXTREMITY Left 3/18/2020    Procedure: Angiogram Extremity Unilateral;  Surgeon: Ali Khoobehi, MD;  Location: [facility] CATH/EP LAB;  Service: Cardiology;  Laterality: Left;    APPENDECTOMY      CARDIAC CATHETERIZATION  07/03/2017    CHOLECYSTECTOMY      heal surgery Right     HYSTERECTOMY      INSERTION OF STENT INTO PERIPHERAL VESSEL N/A 1/7/2020    Procedure: INSERTION, STENT, VESSEL, PERIPHERAL;  Surgeon: Joseph Loyola MD;  Location: [facility] CATH/EP LAB;  Service: Cardiology;  Laterality: N/A;    PARATHYROIDECTOMY      PARATHYROIDECTOMY  07/13/2017    PERCUTANEOUS TRANSLUMINAL ANGIOPLASTY OF ARTERIOVENOUS FISTULA N/A 1/7/2020    Procedure: PTA, AV FISTULA;  Surgeon: Joseph Loyola MD;  Location: [facility] CATH/EP LAB;  Service: Cardiology;  Laterality: N/A;    PERITONEAL CATHETER INSERTION      RENAL BIOPSY      vocal cord nodule       Family History   Problem Relation Age of Onset    Heart disease Sister     Early death Sister         heart as baby    Heart disease Maternal Grandfather     Diabetes Mother     Hypertension Mother     Heart failure Mother     Heart disease Mother     Arthritis Mother     Diabetes Father     Early death Sister         infant     Social History     Tobacco Use    Smoking status: Current Some Day Smoker     Packs/day: 0.50     Years: 45.00     Pack years: 22.50     Types: Cigarettes    Smokeless  tobacco: Never Used   Substance Use Topics    Alcohol use: No    Drug use: No      Review of patient's allergies indicates:  No Known Allergies   Prior to Admission medications    Medication Sig Start Date End Date Taking? Authorizing Provider   aspirin (ECOTRIN) 81 MG EC tablet Take 81 mg by mouth once daily.   Yes Historical Provider, MD   calcium acetate (PHOSLO) 667 mg capsule Take 667 mg by mouth once daily.    Yes Historical Provider, MD   diltiaZEM (CARDIZEM CD) 180 MG 24 hr capsule Take 180 mg by mouth once daily.   Yes Historical Provider, MD   folic acid/vit B complex and C (JENNIFER-KODAK ORAL) Take 1 tablet by mouth once daily.   Yes Historical Provider, MD   isosorbide mononitrate (IMDUR) 30 MG 24 hr tablet Take 30 mg by mouth once daily.   Yes Historical Provider, MD   losartan (COZAAR) 25 MG tablet Take 25 mg by mouth once daily.   Yes Historical Provider, MD   magnesium oxide (MAG-OX) 400 mg (241.3 mg magnesium) tablet Take 400 mg by mouth once daily.   Yes Historical Provider, MD   metoprolol tartrate (LOPRESSOR) 25 MG tablet Take 25 mg by mouth 2 (two) times daily.   Yes Historical Provider, MD   oxyCODONE-acetaminophen (PERCOCET) 5-325 mg per tablet Take 1-2 tablets by mouth daily as needed for Pain (on dialysis days).   Yes Historical Provider, MD   traMADoL (ULTRAM) 50 mg tablet Take 50 mg by mouth every 8 (eight) hours as needed for Pain.   Yes Historical Provider, MD   warfarin (COUMADIN) 3 MG tablet Take 3 mg by mouth every evening.   Yes Historical Provider, MD   zinc sulfate 220 mg Tab tablet Take 220 mg by mouth every other day.   Yes Historical Provider, MD   mupirocin (BACTROBAN) 2 % ointment Apply topically 3 (three) times daily.  Patient taking differently: Apply 1 g topically 3 (three) times daily.  2/19/20   Kalie Neely MD   pantoprazole (PROTONIX) 40 MG tablet Take 40 mg by mouth 2 (two) times daily.    Historical Provider, MD              sodium chloride 0.9%   Intravenous  Once    aspirin  81 mg Oral Daily    calcium acetate(phosphat bind)  667 mg Oral Daily    collagenase   Topical (Top) Daily    diltiaZEM  180 mg Oral Daily    isosorbide mononitrate  30 mg Oral Daily    losartan  25 mg Oral Daily    magnesium oxide  400 mg Oral Daily    metoprolol tartrate  25 mg Oral BID    pantoprazole  40 mg Oral BID    piperacillin-tazobactam (ZOSYN) IVPB  3.375 g Intravenous Q12H    sodium thiosulfate  9 g/m2 Intravenous Once    warfarin  3 mg Oral Daily    zinc sulfate  220 mg Oral Daily     sodium chloride 0.9%, acetaminophen, albuterol sulfate, morphine, ondansetron, oxyCODONE-acetaminophen, senna-docusate 8.6-50 mg, sodium chloride 0.9%, traMADoL, Pharmacy to dose Vancomycin consult **AND** vancomycin - pharmacy to dose       OBJECTIVE:     Vital Signs (Most Recent)  Temp: 98 °F (36.7 °C) (07/08/20 0840)  Pulse: 87 (07/08/20 1030)  Resp: 18 (07/08/20 0840)  BP: 139/82 (07/08/20 1030)  SpO2: 100 % (07/08/20 0811)    Vital Signs Range (Last 24H):  Temp:  [97.4 °F (36.3 °C)-98.3 °F (36.8 °C)]   Pulse:  []   Resp:  [11-23]   BP: ()/(50-90)   SpO2:  [96 %-100 %]     Physical Exam:  General: On dialysis.  Awake and alert. In no distress.  HEENT: No scleral icterus, MM moist.   NECK:  JVD. No carotid bruits.  CV:  RRR without murmurs, rubs, gallops.  PULM:  Clear without crackles, rhonchi, wheezes.  ABD:  Soft, nl BS, NT, ND.  EXTR:  No edema, clubbing, cyanosis.   NEURO: Grossly Intact. No focal signs.  SKIN:  Left inner thigh wound bandaged.  No rashes.   MS:  No joint effusions. Or erythema.   PSYCH: Normal Mood.    Laboratory:  Recent Labs   Lab 07/07/20  1040 07/08/20  0611   * 137   K 5.2* 5.6*   CL 94* 96   CO2 27 26   BUN 37* 42*   CREATININE 5.3* 6.1*    56*   CALCIUM 8.4* 8.2*   PHOS 6.0* 6.3*       Recent Labs   Lab 07/07/20  1040 07/08/20  0611   WBC 5.20 5.26   HGB 7.4* 8.1*   HCT 25.2* 27.6*    202       Active Hospital Problems     Diagnosis  POA    Wound infection [T14.8XXA, L08.9]  Yes      Resolved Hospital Problems   No resolved problems to display.       ASSESSMENT/PLAN:     ESRD  -no acute issues  -continue MWF schedule  -volume good    Calciphylaxis:  -control phosphorus with binders  -sodium thiosulfate with dialysis  -aggressive wound care    Left thigh wound:  -defer to wound care here  -I have discussed the need for long-term wound care and LTAC  -give sodium thiosulfate post dialysis which she has been receiving outpatient    Hyperkalemia:  -using a low K dialysate  -monitor serum potassium    Anemia:  -DONNY's with dialysis  -no symptoms of bleeding  -no thrombocytopenia    Secondary hyperparathyroidism:  -hypocalcemia corrects with hypoalbuminemia  -binders with meals  -monitor calcium and phosphorus and check PTH    Hypertension:  -volume good  -variable control but acceptable  -continue current meds    Thank you for the consult.  We will follow up with you.    Joseph Ingram M.D.

## 2020-07-08 NOTE — PROGRESS NOTES
Pharmacokinetic Assessment Follow Up: IV Vancomycin    Vancomycin serum concentration assessment(s):    The random level was drawn correctly and can be used to guide therapy at this time. The measurement is below the desired definitive target range of 10 to 15 mcg/mL.    Vancomycin Regimen Plan:    Continue Vancomycin 1000 mg IV x1 dose with Random level due on 7/11 with routine AM labs.    Drug levels (last 3 results):  Recent Labs   Lab Result Units 07/08/20  1119   Vancomycin, Random ug/mL 9.1       Pharmacy will continue to follow and monitor vancomycin.    Please contact pharmacy at extension [phone] for questions regarding this assessment.    Thank you for the consult,   Katlyn Downs       Patient brief summary:  Griselda Neely is a 74 y.o. female initiated on antimicrobial therapy with IV Vancomycin for treatment of skin & soft tissue infection    The patient's current regimen is Intermittent Dosing following levels    Drug Allergies:   Review of patient's allergies indicates:  No Known Allergies    Actual Body Weight:   57 kg    Renal Function:   Estimated Creatinine Clearance: 7.4 mL/min (A) (based on SCr of 6.1 mg/dL (H)).,     Dialysis Method (if applicable):  intermittent HD    CBC (last 72 hours):  Recent Labs   Lab Result Units 07/07/20  1040 07/08/20  0611   WBC K/uL 5.20 5.26   Hemoglobin g/dL 7.4* 8.1*   Hematocrit % 25.2* 27.6*   Platelets K/uL 239 202   Gran% % 77.3* 70.1   Lymph% % 9.8* 14.3*   Mono% % 11.9 13.5   Eosinophil% % 0.4 1.1   Basophil% % 0.0 0.2   Differential Method  Automated Automated       Metabolic Panel (last 72 hours):  Recent Labs   Lab Result Units 07/07/20  1040 07/08/20  0611   Sodium mmol/L 133* 137   Potassium mmol/L 5.2* 5.6*   Chloride mmol/L 94* 96   CO2 mmol/L 27 26   Glucose mg/dL 100 56*   BUN, Bld mg/dL 37* 42*   Creatinine mg/dL 5.3* 6.1*   Albumin g/dL 3.0* 2.7*   Total Bilirubin mg/dL 1.3* 0.9   Alkaline Phosphatase U/L 133 110   AST U/L 38 26   ALT U/L 20 17    Magnesium mg/dL  --  2.5   Phosphorus mg/dL 6.0* 6.3*       Vancomycin Administrations:  vancomycin given in the last 96 hours                     vancomycin in dextrose 5 % 1 gram/250 mL IVPB 1,000 mg (mg) 1,000 mg New Bag 07/07/20 1145                    Microbiologic Results:  Microbiology Results (last 7 days)       Procedure Component Value Units Date/Time    Blood culture #2 **CANNOT BE ORDERED STAT** [[id number]] Collected: 07/07/20 1130    Order Status: Completed Specimen: Blood from Peripheral, Wrist, Left Updated: 07/07/20 1931     Blood Culture, Routine No Growth to date    Blood culture #1 **CANNOT BE ORDERED STAT** [[id number]] Collected: 07/07/20 1112    Order Status: Completed Specimen: Blood from Peripheral, Hand, Left Updated: 07/07/20 1758     Blood Culture, Routine No Growth to date    Aerobic culture [[id number]]     Order Status: No result Specimen: Wound from Leg, Left

## 2020-07-08 NOTE — CARE UPDATE
07/08/20 1304   Patient Assessment/Suction   Level of Consciousness (AVPU) alert   Respiratory Effort Normal;Unlabored   Expansion/Accessory Muscles/Retractions no use of accessory muscles   PRE-TX-O2   O2 Device (Oxygen Therapy) nasal cannula   $ Is the patient on Low Flow Oxygen? Yes   Flow (L/min) 3   Pulse Oximetry Type Intermittent   $ Pulse Oximetry - Multiple Charge Pulse Oximetry - Multiple   Pulse 94   Resp 18   Aerosol Therapy   $ Aerosol Therapy Charges PRN treatment not required   Respiratory Evaluation   $ Care Plan Tech Time 15 min

## 2020-07-08 NOTE — PROGRESS NOTES
[facility] Medicine  Progress Note  Patient Name: Griselda Neely  MRN: [id number]  Patient Class: IP- Inpatient   Admission Date: 7/7/2020 10:03 AM   Attending Physician:  Yang Royal MD  Primary Care Provider: Kalie Neely MD  Face-to-Face encounter date: 07/08/2020    HISTORY OF PRESENT ILLNESS:   Griselda Neely is a 74 y.o. Black or  female who  has a past medical history of Anemia, Calciphylaxis (07/2017), CHF (congestive heart failure), Encounter for blood transfusion (03/2016), Gout, Hypertension, Mitral valve regurgitation, Osteoarthritis, Pancreatitis, Peripheral vascular disease, Peritoneal dialysis catheter in place, Pneumonia (09/09/2017), and Renal failure.. The patient presented to [facility] on 7/7/2020 with a primary complaint of   History was obtained from the patient and the family present at the bedside and ER physician Sign-out. Patient is referred to the ED by wound care for wound to left medial thigh. The patient reports she has been treated outpatient with antibiotics and she has been having worsening pain to her left leg. She was seen by wound care today and was instructed to come to the ED for further evaluation. She has a history of noncompliance and refuses to have U/S of legs in the ED.  The patient has a history of ESRD on HD. She had her last HD on yesterday. She denies fever, chills, chest pain, sob, vomiting, abdominal pain, diarrhea, dizziness or LOC.   In the ED, VSS. CBC shows anemia that is chronic. H/H stable at baseline. She denies black or bloody stools. CMP was unremarkable.  EKG shows A-fib with controlled rate. No new signs of ischemia. CXR shows cardiomegaly and mild pulmonary edema with trace bilateral pleural effusions. Blood cultures were drawn and the patient was started on IV antibiotics.   Decision to admit was taken and patient was informed about the plan of care.     Interval history:   Today the  patient reports persistent pain to wound of left leg, constant timing, moderate intensity, improved with pain medication.  No fever or chills.  No chest pain or shortness of breath.  Tolerating hemodialysis today.    PHYSICAL EXAM:     Vitals Reviewed  General appearance: Well-developed, no apparent distress, thin, nontoxic  Skin: Skin is dry and warm; left leg wound dressed; wound photos reviewed  Neuro:  Nonfocal motor exam, fluent speech, alert and oriented  HENT: Atraumatic head. Moist mucous membranes of oral cavity.  Eyes:  Anicteric sclerae, no conjunctival discharge, PERRLA.  Lungs: Clear to auscultation bilaterally.  Comfortable work of breathing  Heart: Irregularly irregular rhythm.  2+ radial pulses  Abdomen: Soft, non-distended, non-tender.   Psych:  Mood okay, Flat mood and affect.  Insight fair     LABS AND IMAGING:     Labs Reviewed   CBC W/ AUTO DIFFERENTIAL - Abnormal; Notable for the following components:       Result Value    RBC 2.65 (*)     Hemoglobin 7.4 (*)     Hematocrit 25.2 (*)     Mean Corpuscular Hemoglobin Conc 29.4 (*)     RDW 19.2 (*)     Immature Granulocytes 0.6 (*)     Lymph # 0.5 (*)     nRBC 1 (*)     Gran% 77.3 (*)     Lymph% 9.8 (*)     All other components within normal limits   COMPREHENSIVE METABOLIC PANEL - Abnormal; Notable for the following components:    Sodium 133 (*)     Potassium 5.2 (*)     Chloride 94 (*)     BUN, Bld 37 (*)     Creatinine 5.3 (*)     Calcium 8.4 (*)     Albumin 3.0 (*)     Total Bilirubin 1.3 (*)     eGFR if  8.6 (*)     eGFR if non  7.5 (*)     All other components within normal limits   PROTIME-INR - Abnormal; Notable for the following components:    PT 17.7 (*)     All other components within normal limits   TSH   LACTIC ACID, PLASMA   SARS-COV-2 RNA AMPLIFICATION, QUAL       X-Ray Chest AP Portable   Final Result          ASSESSMENT & PLAN:   Calciphylaxis:  Chronic wound to left medial thigh; acute on chronic  pain is concerning for secondary bacterial infection  Admit to Med-tele  Consult wound care  Continue IV Zosyn/Vanc  Blood and wound cultures pending  Prn analgesics  Refused to have ultrasound of legs  AM labs to include phosphorus level    ESRD on HD:  Consult Dr. Ingram for HD MWF  No respiratory distress at this time  CXR with mild pulmonary edema    Anemia:  Chronic; H/H stable at baseline  No signs of acute bleeding at this time; denies black or bloody stools  Follow h/h    Persistent A-fib:  Controlled rate at this time  Continue metoprolol and coumadin (INR 1.5 today)  Daily PT/INR    DVT Prophylaxis: Continue coumadin for DVT prophylaxis and will be advised to be as mobile as possible and sit in a chair as tolerated.     Anemia   Calciphylaxis   both legs   CHF (congestive heart failure)   Encounter for blood transfusion   Gout   Hypertension   Mitral valve regurgitation   Osteoarthritis   Pancreatitis   Peripheral vascular disease   Peritoneal dialysis catheter in place   Pneumonia   Renal failure     Plan update today:  Continue care.  Appreciate consultants.  Continue hemodialysis per nephrology  Continue sodium thiosulfate with dialysis for calciphylaxis  At this time will hold empiric IV antibiotics.  I am not convinced that the patient's wound is infected.  Will discuss with wound care tomorrow.  Continue local wound care.  General surgery consultation tomorrow for possible surgical debridement.  The patient is appropriate for inpatient status based on severity of illness with ongoing workup and management as above.  Case was discussed with case management today including multi disciplinary rounds.  High-risk patient secondary to severe exacerbation of chronic illness.  VTE prophylaxis with Lovenox; hold Coumadin due to possible surgery in near future    Yang Royal  [facility] Hospitalist  07/08/2020

## 2020-07-08 NOTE — NURSING
Patient refusing wound care, states she doesn't want anyone touching or putting anything on it. Will try again later

## 2020-07-09 LAB
ALBUMIN SERPL BCP-MCNC: 2.5 G/DL (ref 3.5–5.2)
ANION GAP SERPL CALC-SCNC: 11 MMOL/L (ref 8–16)
BUN SERPL-MCNC: 24 MG/DL (ref 8–23)
CALCIUM SERPL-MCNC: 7.5 MG/DL (ref 8.7–10.5)
CHLORIDE SERPL-SCNC: 99 MMOL/L (ref 95–110)
CO2 SERPL-SCNC: 25 MMOL/L (ref 23–29)
CREAT SERPL-MCNC: 4.7 MG/DL (ref 0.5–1.4)
ERYTHROCYTE [DISTWIDTH] IN BLOOD BY AUTOMATED COUNT: 19.6 % (ref 11.5–14.5)
EST. GFR  (AFRICAN AMERICAN): 9.9 ML/MIN/1.73 M^2
EST. GFR  (NON AFRICAN AMERICAN): 8.6 ML/MIN/1.73 M^2
GLUCOSE SERPL-MCNC: 123 MG/DL (ref 70–110)
HCT VFR BLD AUTO: 26 % (ref 37–48.5)
HGB BLD-MCNC: 7.7 G/DL (ref 12–16)
INR PPP: 1.7
MCH RBC QN AUTO: 27.9 PG (ref 27–31)
MCHC RBC AUTO-ENTMCNC: 29.6 G/DL (ref 32–36)
MCV RBC AUTO: 94 FL (ref 82–98)
PHOSPHATE SERPL-MCNC: 4.7 MG/DL (ref 2.7–4.5)
PLATELET # BLD AUTO: 186 K/UL (ref 150–350)
PMV BLD AUTO: 11.4 FL (ref 9.2–12.9)
POTASSIUM SERPL-SCNC: 3.9 MMOL/L (ref 3.5–5.1)
PROTHROMBIN TIME: 18.8 SEC (ref 10.6–14.8)
PTH-INTACT SERPL-MCNC: 75.6 PG/ML (ref 9–77)
RBC # BLD AUTO: 2.76 M/UL (ref 4–5.4)
SODIUM SERPL-SCNC: 135 MMOL/L (ref 136–145)
WBC # BLD AUTO: 5.07 K/UL (ref 3.9–12.7)

## 2020-07-09 PROCEDURE — 12000002 HC ACUTE/MED SURGE SEMI-PRIVATE ROOM

## 2020-07-09 PROCEDURE — 25000003 PHARM REV CODE 250: Performed by: INTERNAL MEDICINE

## 2020-07-09 PROCEDURE — 27000221 HC OXYGEN, UP TO 24 HOURS

## 2020-07-09 PROCEDURE — 63600175 PHARM REV CODE 636 W HCPCS: Performed by: NURSE PRACTITIONER

## 2020-07-09 PROCEDURE — 94761 N-INVAS EAR/PLS OXIMETRY MLT: CPT

## 2020-07-09 PROCEDURE — 99900035 HC TECH TIME PER 15 MIN (STAT)

## 2020-07-09 PROCEDURE — 85610 PROTHROMBIN TIME: CPT

## 2020-07-09 PROCEDURE — 83970 ASSAY OF PARATHORMONE: CPT

## 2020-07-09 PROCEDURE — 85027 COMPLETE CBC AUTOMATED: CPT

## 2020-07-09 PROCEDURE — 80069 RENAL FUNCTION PANEL: CPT

## 2020-07-09 PROCEDURE — 36415 COLL VENOUS BLD VENIPUNCTURE: CPT

## 2020-07-09 PROCEDURE — 25000003 PHARM REV CODE 250: Performed by: NURSE PRACTITIONER

## 2020-07-09 RX ORDER — ONDANSETRON 4 MG/1
8 TABLET, ORALLY DISINTEGRATING ORAL EVERY 6 HOURS PRN
Status: DISCONTINUED | OUTPATIENT
Start: 2020-07-09 | End: 2020-07-15 | Stop reason: HOSPADM

## 2020-07-09 RX ADMIN — MORPHINE SULFATE 2 MG: 2 INJECTION, SOLUTION INTRAMUSCULAR; INTRAVENOUS at 11:07

## 2020-07-09 RX ADMIN — METOPROLOL TARTRATE 25 MG: 25 TABLET, FILM COATED ORAL at 09:07

## 2020-07-09 RX ADMIN — MAGNESIUM OXIDE 400 MG: 400 TABLET ORAL at 09:07

## 2020-07-09 RX ADMIN — LOSARTAN POTASSIUM 25 MG: 25 TABLET, FILM COATED ORAL at 09:07

## 2020-07-09 RX ADMIN — MORPHINE SULFATE 2 MG: 2 INJECTION, SOLUTION INTRAMUSCULAR; INTRAVENOUS at 01:07

## 2020-07-09 RX ADMIN — ONDANSETRON 8 MG: 4 TABLET, ORALLY DISINTEGRATING ORAL at 05:07

## 2020-07-09 RX ADMIN — SEVELAMER CARBONATE 1600 MG: 800 TABLET, FILM COATED ORAL at 09:07

## 2020-07-09 RX ADMIN — ASPIRIN 81 MG: 81 TABLET, DELAYED RELEASE ORAL at 09:07

## 2020-07-09 RX ADMIN — ONDANSETRON 4 MG: 2 INJECTION INTRAMUSCULAR; INTRAVENOUS at 11:07

## 2020-07-09 RX ADMIN — PANTOPRAZOLE SODIUM 40 MG: 40 TABLET, DELAYED RELEASE ORAL at 05:07

## 2020-07-09 RX ADMIN — DILTIAZEM HYDROCHLORIDE 180 MG: 180 CAPSULE, COATED, EXTENDED RELEASE ORAL at 09:07

## 2020-07-09 RX ADMIN — PANTOPRAZOLE SODIUM 40 MG: 40 TABLET, DELAYED RELEASE ORAL at 06:07

## 2020-07-09 RX ADMIN — OXYCODONE AND ACETAMINOPHEN 1 TABLET: 5; 325 TABLET ORAL at 04:07

## 2020-07-09 RX ADMIN — ISOSORBIDE MONONITRATE 30 MG: 30 TABLET, EXTENDED RELEASE ORAL at 09:07

## 2020-07-09 RX ADMIN — TRAMADOL HYDROCHLORIDE 50 MG: 50 TABLET, FILM COATED ORAL at 06:07

## 2020-07-09 RX ADMIN — ZINC SULFATE 220 MG (50 MG) CAPSULE 220 MG: CAPSULE at 09:07

## 2020-07-09 NOTE — PROGRESS NOTES
[facility] Medicine  Progress Note  Patient Name: Griselda Neely  MRN: [id number]  Patient Class: IP- Inpatient   Admission Date: 7/7/2020 10:03 AM   Attending Physician:  Yang Royal MD  Primary Care Provider: Kalie Neely MD  Face-to-Face encounter date: 07/09/2020    HISTORY OF PRESENT ILLNESS:   Griselda Neely is a 74 y.o. Black or  female who  has a past medical history of Anemia, Calciphylaxis (07/2017), CHF (congestive heart failure), Encounter for blood transfusion (03/2016), Gout, Hypertension, Mitral valve regurgitation, Osteoarthritis, Pancreatitis, Peripheral vascular disease, Peritoneal dialysis catheter in place, Pneumonia (09/09/2017), and Renal failure.. The patient presented to [facility] on 7/7/2020 with a primary complaint of   History was obtained from the patient and the family present at the bedside and ER physician Sign-out. Patient is referred to the ED by wound care for wound to left medial thigh. The patient reports she has been treated outpatient with antibiotics and she has been having worsening pain to her left leg. She was seen by wound care today and was instructed to come to the ED for further evaluation. She has a history of noncompliance and refuses to have U/S of legs in the ED.  The patient has a history of ESRD on HD. She had her last HD on yesterday. She denies fever, chills, chest pain, sob, vomiting, abdominal pain, diarrhea, dizziness or LOC.   In the ED, VSS. CBC shows anemia that is chronic. H/H stable at baseline. She denies black or bloody stools. CMP was unremarkable.  EKG shows A-fib with controlled rate. No new signs of ischemia. CXR shows cardiomegaly and mild pulmonary edema with trace bilateral pleural effusions. Blood cultures were drawn and the patient was started on IV antibiotics.   Decision to admit was taken and patient was informed about the plan of care.     Interval history:   Today the  patient reports persistent pain to wound of left leg, constant timing, mild intensity, improved with pain medication.  No fever or chills.  No chest pain or shortness of breath.     PHYSICAL EXAM:     Vitals Reviewed  General appearance: Well-developed, no apparent distress, thin, nontoxic  Skin: Skin is dry and warm; left leg wound dressed; wound photos reviewed yesterday  Neuro:  Nonfocal motor exam, fluent speech, alert and oriented  HENT: Atraumatic head. Moist mucous membranes of oral cavity.  Eyes:  Anicteric sclerae, no conjunctival discharge, PERRLA.  Lungs: Clear to auscultation bilaterally.  Comfortable work of breathing  Heart: Irregularly irregular rhythm.  2+ radial pulses  Abdomen: Soft, non-distended, non-tender.   Psych:  Mood okay, Flat mood and affect.  Insight fair     LABS AND IMAGING:     Labs Reviewed   CBC W/ AUTO DIFFERENTIAL - Abnormal; Notable for the following components:       Result Value    RBC 2.65 (*)     Hemoglobin 7.4 (*)     Hematocrit 25.2 (*)     Mean Corpuscular Hemoglobin Conc 29.4 (*)     RDW 19.2 (*)     Immature Granulocytes 0.6 (*)     Lymph # 0.5 (*)     nRBC 1 (*)     Gran% 77.3 (*)     Lymph% 9.8 (*)     All other components within normal limits   COMPREHENSIVE METABOLIC PANEL - Abnormal; Notable for the following components:    Sodium 133 (*)     Potassium 5.2 (*)     Chloride 94 (*)     BUN, Bld 37 (*)     Creatinine 5.3 (*)     Calcium 8.4 (*)     Albumin 3.0 (*)     Total Bilirubin 1.3 (*)     eGFR if  8.6 (*)     eGFR if non  7.5 (*)     All other components within normal limits   PROTIME-INR - Abnormal; Notable for the following components:    PT 17.7 (*)     All other components within normal limits   TSH   LACTIC ACID, PLASMA   SARS-COV-2 RNA AMPLIFICATION, QUAL       X-Ray Chest AP Portable   Final Result          ASSESSMENT & PLAN:   Calciphylaxis:  Chronic wound to left medial thigh; acute on chronic pain is concerning for  secondary bacterial infection  Admit to Med-tele  Consult wound care  Continue IV Zosyn/Vanc  Blood and wound cultures pending  Prn analgesics  Refused to have ultrasound of legs  AM labs to include phosphorus level    ESRD on HD:  Consult Dr. Ingram for HD MWF  No respiratory distress at this time  CXR with mild pulmonary edema    Anemia:  Chronic; H/H stable at baseline  No signs of acute bleeding at this time; denies black or bloody stools  Follow h/h    Persistent A-fib:  Controlled rate at this time  Continue metoprolol and coumadin (INR 1.5 today)  Daily PT/INR    DVT Prophylaxis: Continue coumadin for DVT prophylaxis and will be advised to be as mobile as possible and sit in a chair as tolerated.     Anemia   Calciphylaxis   both legs   CHF (congestive heart failure)   Encounter for blood transfusion   Gout   Hypertension   Mitral valve regurgitation   Osteoarthritis   Pancreatitis   Peripheral vascular disease   Peritoneal dialysis catheter in place   Pneumonia   Renal failure     Plan update today:  Continue care.  Appreciate consultants.  General surgery consultation for possible debridement  Continue local wound care  Continue hemodialysis per nephrology  Continue sodium thiosulfate with dialysis for calciphylaxis  Continue to hold empiric IV antibiotics.  I am not convinced that the patient's wound is infected.  Will discuss with wound care.  Moderate-risk patient secondary to moderate exacerbation of chronic illness; prescription drug management  VTE prophylaxis with Lovenox; hold Coumadin due to possible surgery in near future    Yang Royal  [facility] Hospitalist  07/09/2020

## 2020-07-09 NOTE — PROGRESS NOTES
Progress Note  [facility]    Admit Date: 7/7/2020   LOS: 1 day     SUBJECTIVE:     Follow-up For:  ESRD    C/o pain 10/10 left thigh, nurse at bedside with pain meds IV  No c/o otherwise  Reports pain worsens on dialysis, adjust UF  Appetite fair    Review of Systems:  GENERAL: Pain thigh wd; No fever, chills, night sweats, decreased intake, weight loss.  HEENT: No blurry vision, decreased vision, floaters, hearing defects, sore throat.  CV:  No CP, ARGUELLO, orthopnea, PND, palpitations.  PULM:  No cough, hemoptysis, wheezing.  GI:  No nausea, vomiting, diarrhea, BRBPR, melena, abdominal pain.  :  No dysuria, frequency, urgency, hematuria.  NEURO: No LOC, seizure activity, dizziness.  SKIN:  Wd thigh with abd in place; No rash, pruritis, petechiae  MS:  No arthralgia, muscle aches, arthritis  PSYCH: No depression, anxiety  ENDO:  No polydipsia, polyuria, heat or cold intolerance  Dialysis fistula right arm    OBJECTIVE:     Vital Signs (Most Recent)  Temp: 97.8 °F (36.6 °C) (07/09/20 0813)  Pulse: 76 (07/09/20 0856)  Resp: 15 (07/09/20 0856)  BP: 132/79 (07/09/20 0813)  SpO2: 99 % (07/09/20 0856)    Vital Signs Range (Last 24H):  Temp:  [97.8 °F (36.6 °C)-98 °F (36.7 °C)]   Pulse:  []   Resp:  [15-18]   BP: (113-169)/(62-98)   SpO2:  [99 %]     I/O last 3 completed shifts:  In: 900 [P.O.:50; Other:500; IV Piggyback:350]  Out: 3000 [Other:3000]    Physical Exam:   General: Awake and alert. Pain thigh.  HEENT: No scleral icterus, MM moist.  NECK: No JVD. No carotid bruits.  CV: Regular rate and rhthym with murmurs  PULM: Clear without crackles, rhonchi, wheezes.  ABD: Soft, nl BS, NT, ND.  EXTR: No edema, clubbing, cyanosis.   NEURO: Non focal and grossly intact.  SKIN: Wd left thigh ABD pain dry intact uninterrupted   MS: No joint effusions or erythema.   PSYCH: Normal Mood.  Right arm fistula positive thrill and bruit with dressing intact    Laboratory:  Recent Labs   Lab 07/09/20  0517    HGB 7.7*   HCT 26.0*   WBC 5.07          Recent Labs   Lab 07/07/20  1040 07/08/20  0611 07/09/20  0516   * 137 135*   K 5.2* 5.6* 3.9   CL 94* 96 99   CO2 27 26 25   BUN 37* 42* 24*   CREATININE 5.3* 6.1* 4.7*    56* 123*   CALCIUM 8.4* 8.2* 7.5*   PHOS 6.0* 6.3* 4.7*       Lab Results   Component Value Date    PTH 75.6 07/09/2020    CALCIUM 7.5 (L) 07/09/2020    CAION 0.84 (L) 07/26/2017    PHOS 4.7 (H) 07/09/2020       Lab Results   Component Value Date    URICACID 4.0 03/17/2020       BNP  No results for input(s): BNP in the last 168 hours.    Problem List:    Active Hospital Problems    Diagnosis  POA    Wound infection [T14.8XXA, L08.9]  Yes      Resolved Hospital Problems   No resolved problems to display.       Nephrology Assessment/Plan:    ESRD  -no acute issues  -continue MWF schedule  -volume good     Calciphylaxis:  -phosphorus controlled with binders, continue as ordered with meals  -sodium thiosulfate with dialysis  -aggressive wound care     Left thigh wound:  -defer to wound care here  -I have discussed the need for long-term wound care and LTAC  -give sodium thiosulfate post dialysis which she has been receiving outpatient     Hyperkalemia:  -Resolved  -using a low K dialysate  -monitor serum potassium     Anemia:  -DONNY's with dialysis  -no symptoms of bleeding  -no thrombocytopenia     Secondary hyperparathyroidism:  -hypocalcemia corrects with hypoalbuminemia  -binders with meals  -monitor calcium and phosphorus and check PTH     Hypertension:  -volume good  -variable control but acceptable  -continue current meds       RL Chung/Joseph Ingram M.D.

## 2020-07-09 NOTE — PROGRESS NOTES
Pharmacist Renal Dose Adjustment Note    Griselda Neely is a 74 y.o. female being treated with the medication enoxaparin    Patient Data:    Vital Signs (Most Recent):  Temp: 98 °F (36.7 °C) (07/08/20 1910)  Pulse: 80 (07/08/20 2144)  Resp: 16 (07/08/20 2144)  BP: 118/72 (07/08/20 1910)  SpO2: 99 % (07/08/20 2144) Vital Signs (72h Range):  Temp:  [97.4 °F (36.3 °C)-98.3 °F (36.8 °C)]   Pulse:  []   Resp:  [11-23]   BP: ()/(50-98)   SpO2:  [96 %-100 %]      Recent Labs   Lab 07/07/20  1040 07/08/20  0611   CREATININE 5.3* 6.1*     Serum creatinine: 6.1 mg/dL (H) 07/08/20 0611  Estimated creatinine clearance: 7.4 mL/min (A)    Medication:enoxaparin 40 mg q 24 h will be changed to 30 mg q 24 h per crcl < 30 ml/min    Pharmacist's Name: Dario Sargent  Pharmacist's Extension: [phone]

## 2020-07-09 NOTE — PLAN OF CARE
07/08/20 9118   Patient Assessment/Suction   Level of Consciousness (AVPU) alert   Respiratory Effort Normal;Unlabored   Expansion/Accessory Muscles/Retractions expansion symmetric;no retractions;no use of accessory muscles   All Lung Fields Breath Sounds clear;equal bilaterally   PRE-TX-O2   O2 Device (Oxygen Therapy) nasal cannula   Flow (L/min) 3   SpO2 99 %   Pulse Oximetry Type Intermittent   $ Pulse Oximetry - Multiple Charge Pulse Oximetry - Multiple   Pulse 80   Resp 16   Aerosol Therapy   $ Aerosol Therapy Charges PRN treatment not required   Respiratory Evaluation   $ Care Plan Tech Time 15 min   Evaluation For   (careplan)

## 2020-07-09 NOTE — PLAN OF CARE
07/09/20 0856   Patient Assessment/Suction   Level of Consciousness (AVPU) alert   All Lung Fields Breath Sounds clear   PRE-TX-O2   O2 Device (Oxygen Therapy) nasal cannula   $ Is the patient on Low Flow Oxygen? Yes   Flow (L/min) 3  (decreased to 2)   SpO2 99 %   Pulse Oximetry Type Intermittent   $ Pulse Oximetry - Multiple Charge Pulse Oximetry - Multiple   Pulse 76   Resp 15   Aerosol Therapy   $ Aerosol Therapy Charges PRN treatment not required   Respiratory Evaluation   $ Care Plan Tech Time 15 min

## 2020-07-10 ENCOUNTER — EXTERNAL HOME HEALTH (OUTPATIENT)
Dept: HOME HEALTH SERVICES | Facility: HOSPITAL | Age: 75
End: 2020-07-10

## 2020-07-10 PROBLEM — L97.922 CALCIPHYLAXIS OF LEFT LOWER EXTREMITY WITH NONHEALING ULCER WITH FAT LAYER EXPOSED: Status: ACTIVE | Noted: 2020-07-10

## 2020-07-10 PROBLEM — E83.59 CALCIPHYLAXIS OF LEFT LOWER EXTREMITY WITH NONHEALING ULCER WITH FAT LAYER EXPOSED: Status: ACTIVE | Noted: 2020-07-10

## 2020-07-10 LAB
ALBUMIN SERPL BCP-MCNC: 2.7 G/DL (ref 3.5–5.2)
ANION GAP SERPL CALC-SCNC: 12 MMOL/L (ref 8–16)
BUN SERPL-MCNC: 28 MG/DL (ref 8–23)
CALCIUM SERPL-MCNC: 8.1 MG/DL (ref 8.7–10.5)
CHLORIDE SERPL-SCNC: 99 MMOL/L (ref 95–110)
CO2 SERPL-SCNC: 26 MMOL/L (ref 23–29)
CREAT SERPL-MCNC: 5.9 MG/DL (ref 0.5–1.4)
ERYTHROCYTE [DISTWIDTH] IN BLOOD BY AUTOMATED COUNT: 19.2 % (ref 11.5–14.5)
EST. GFR  (AFRICAN AMERICAN): 7.6 ML/MIN/1.73 M^2
EST. GFR  (NON AFRICAN AMERICAN): 6.6 ML/MIN/1.73 M^2
GLUCOSE SERPL-MCNC: 65 MG/DL (ref 70–110)
HCT VFR BLD AUTO: 27.8 % (ref 37–48.5)
HGB BLD-MCNC: 8.3 G/DL (ref 12–16)
INR PPP: 1.8
MCH RBC QN AUTO: 27.9 PG (ref 27–31)
MCHC RBC AUTO-ENTMCNC: 29.9 G/DL (ref 32–36)
MCV RBC AUTO: 94 FL (ref 82–98)
PHOSPHATE SERPL-MCNC: 5.3 MG/DL (ref 2.7–4.5)
PLATELET # BLD AUTO: 205 K/UL (ref 150–350)
PMV BLD AUTO: 11.2 FL (ref 9.2–12.9)
POTASSIUM SERPL-SCNC: 4.5 MMOL/L (ref 3.5–5.1)
PROTHROMBIN TIME: 20.4 SEC (ref 10.6–14.8)
RBC # BLD AUTO: 2.97 M/UL (ref 4–5.4)
SODIUM SERPL-SCNC: 137 MMOL/L (ref 136–145)
WBC # BLD AUTO: 5.38 K/UL (ref 3.9–12.7)

## 2020-07-10 PROCEDURE — 85027 COMPLETE CBC AUTOMATED: CPT

## 2020-07-10 PROCEDURE — 85610 PROTHROMBIN TIME: CPT

## 2020-07-10 PROCEDURE — 99900035 HC TECH TIME PER 15 MIN (STAT)

## 2020-07-10 PROCEDURE — 25000003 PHARM REV CODE 250: Performed by: INTERNAL MEDICINE

## 2020-07-10 PROCEDURE — 63600175 PHARM REV CODE 636 W HCPCS: Mod: TB | Performed by: INTERNAL MEDICINE

## 2020-07-10 PROCEDURE — 25000003 PHARM REV CODE 250: Performed by: NURSE PRACTITIONER

## 2020-07-10 PROCEDURE — 80069 RENAL FUNCTION PANEL: CPT

## 2020-07-10 PROCEDURE — 63600175 PHARM REV CODE 636 W HCPCS: Performed by: NURSE PRACTITIONER

## 2020-07-10 PROCEDURE — 12000002 HC ACUTE/MED SURGE SEMI-PRIVATE ROOM

## 2020-07-10 PROCEDURE — 63600175 PHARM REV CODE 636 W HCPCS: Performed by: INTERNAL MEDICINE

## 2020-07-10 PROCEDURE — 90935 HEMODIALYSIS ONE EVALUATION: CPT

## 2020-07-10 PROCEDURE — 36415 COLL VENOUS BLD VENIPUNCTURE: CPT

## 2020-07-10 PROCEDURE — 94761 N-INVAS EAR/PLS OXIMETRY MLT: CPT

## 2020-07-10 RX ADMIN — MORPHINE SULFATE 2 MG: 2 INJECTION, SOLUTION INTRAMUSCULAR; INTRAVENOUS at 03:07

## 2020-07-10 RX ADMIN — EPOETIN ALFA-EPBX 2900 UNITS: 10000 INJECTION, SOLUTION INTRAVENOUS; SUBCUTANEOUS at 10:07

## 2020-07-10 RX ADMIN — PANTOPRAZOLE SODIUM 40 MG: 40 TABLET, DELAYED RELEASE ORAL at 05:07

## 2020-07-10 RX ADMIN — ENOXAPARIN SODIUM 30 MG: 30 INJECTION SUBCUTANEOUS at 05:07

## 2020-07-10 RX ADMIN — SEVELAMER CARBONATE 1600 MG: 800 TABLET, FILM COATED ORAL at 05:07

## 2020-07-10 RX ADMIN — OXYCODONE AND ACETAMINOPHEN 1 TABLET: 5; 325 TABLET ORAL at 08:07

## 2020-07-10 RX ADMIN — ZINC SULFATE 220 MG (50 MG) CAPSULE 220 MG: CAPSULE at 08:07

## 2020-07-10 RX ADMIN — TRAMADOL HYDROCHLORIDE 50 MG: 50 TABLET, FILM COATED ORAL at 03:07

## 2020-07-10 RX ADMIN — OXYCODONE AND ACETAMINOPHEN 1 TABLET: 5; 325 TABLET ORAL at 01:07

## 2020-07-10 RX ADMIN — MAGNESIUM OXIDE 400 MG: 400 TABLET ORAL at 08:07

## 2020-07-10 RX ADMIN — ASPIRIN 81 MG: 81 TABLET, DELAYED RELEASE ORAL at 08:07

## 2020-07-10 RX ADMIN — METOPROLOL TARTRATE 25 MG: 25 TABLET, FILM COATED ORAL at 08:07

## 2020-07-10 RX ADMIN — COLLAGENASE SANTYL: 250 OINTMENT TOPICAL at 08:07

## 2020-07-10 RX ADMIN — OXYCODONE AND ACETAMINOPHEN 1 TABLET: 5; 325 TABLET ORAL at 03:07

## 2020-07-10 RX ADMIN — MORPHINE SULFATE 2 MG: 2 INJECTION, SOLUTION INTRAMUSCULAR; INTRAVENOUS at 05:07

## 2020-07-10 RX ADMIN — SEVELAMER CARBONATE 1600 MG: 800 TABLET, FILM COATED ORAL at 08:07

## 2020-07-10 RX ADMIN — SEVELAMER CARBONATE 1600 MG: 800 TABLET, FILM COATED ORAL at 12:07

## 2020-07-10 NOTE — PROGRESS NOTES
Progress Note  [facility]    Admit Date: 7/7/2020   LOS: 2 days     SUBJECTIVE:     Follow-up For:  ESRD    Still with pain to left thigh wound  No c/o otherwise  Ctae dialysis well this am without cramping 3L UF  Appetite fair    Review of Systems:  GENERAL: Pain thigh wd; No fever, chills, night sweats, decreased intake, weight loss.  HEENT: No blurry vision, decreased vision, floaters, hearing defects, sore throat.  CV:  No CP, ARGUELLO, orthopnea, PND, palpitations.  PULM:  No cough, hemoptysis, wheezing.  GI:  No nausea, vomiting, diarrhea, BRBPR, melena, abdominal pain.  :  No dysuria, frequency, urgency, hematuria.  NEURO: No LOC, seizure activity, dizziness.  SKIN:  Wd thigh with abd in place; No rash, pruritis, petechiae  MS:  No arthralgia, muscle aches, arthritis  PSYCH: No depression, anxiety  ENDO:  No polydipsia, polyuria, heat or cold intolerance  Dialysis fistula right arm    OBJECTIVE:     Vital Signs (Most Recent)  Temp: 98.1 °F (36.7 °C) (07/10/20 0758)  Pulse: 77 (07/10/20 0758)  Resp: 19 (07/10/20 0758)  BP: 121/72 (07/10/20 0413)  SpO2: 98 % (07/10/20 0758)    Vital Signs Range (Last 24H):  Temp:  [97.5 °F (36.4 °C)-98.4 °F (36.9 °C)]   Pulse:  [54-77]   Resp:  [15-22]   BP: (121-132)/(71-79)   SpO2:  [94 %-100 %]     I/O last 3 completed shifts:  In: -   Out: 400 [Emesis/NG output:400]    Physical Exam:   General: Awake and alert.   HEENT: No scleral icterus, MM moist.  NECK: No JVD. No carotid bruits.  CV: Regular rate and rhthym with murmurs  PULM: Clear without crackles, rhonchi, wheezes.  ABD: Soft, nl BS, NT, ND.  EXTR: No edema, clubbing, cyanosis.   NEURO: Non focal and grossly intact.  SKIN: Wd left thigh ABD pain dry intact uninterrupted   MS: No joint effusions or erythema.   PSYCH: Normal Mood.  Right arm fistula positive thrill and bruit with dressing intact    Laboratory:  Recent Labs   Lab 07/10/20  0435   HGB 8.3*   HCT 27.8*   WBC 5.38          Recent  Labs   Lab 07/08/20  0611 07/09/20  0516 07/10/20  0435    135* 137   K 5.6* 3.9 4.5   CL 96 99 99   CO2 26 25 26   BUN 42* 24* 28*   CREATININE 6.1* 4.7* 5.9*   GLU 56* 123* 65*   CALCIUM 8.2* 7.5* 8.1*   PHOS 6.3* 4.7* 5.3*       Lab Results   Component Value Date    PTH 75.6 07/09/2020    CALCIUM 8.1 (L) 07/10/2020    CAION 0.84 (L) 07/26/2017    PHOS 5.3 (H) 07/10/2020       Lab Results   Component Value Date    URICACID 4.0 03/17/2020       BNP  No results for input(s): BNP in the last 168 hours.    Problem List:    Active Hospital Problems    Diagnosis  POA    Wound infection [T14.8XXA, L08.9]  Yes      Resolved Hospital Problems   No resolved problems to display.       Nephrology Assessment/Plan:    ESRD  -no acute issues  -continue MWF schedule  -Renal dose all meds  -Low Phos/potassium diet     Calciphylaxis:  -phosphorus controlled with binders, continue as ordered with meals  -sodium thiosulfate with dialysis  -aggressive wound care     Left thigh wound:  -defer to wound care here  -I have discussed the need for long-term wound care and LTAC  -give sodium thiosulfate post dialysis which she has been receiving outpatient     Hyperkalemia:  -Resolved  -using a low K dialysate  -monitor serum potassium     Anemia:  -DONNY's with dialysis  -no symptoms of bleeding  -no thrombocytopenia     Secondary hyperparathyroidism:  -hypocalcemia corrects with hypoalbuminemia  -binders with meals  -monitor calcium and phosphorus; last PTH okay     Hypertension:  -volume good  -variable control but acceptable  -continue current meds       RL Chung/Joseph Ingram M.D.

## 2020-07-10 NOTE — PLAN OF CARE
"  Continues to refuse wound care; stating that she is in too much pain. After given pain medication, still refusing for anyone to touch or remove wound dressing on left thigh.      Continues to refuse administration of Lovenox medication, even after explained importance of same due to not presently receiving Coumadin 2/2 upcoming surgery; stating that "those needles hurt too much".    Problem: Adult Inpatient Plan of Care  Goal: Plan of Care Review  7/9/2020 1921 by Vielka Avelar RN  Outcome: Ongoing, Progressing  7/9/2020 1709 by Vielka Avelar RN  Outcome: Ongoing, Progressing  Goal: Patient-Specific Goal (Individualization)  7/9/2020 1921 by Vielka Avelar RN  Outcome: Ongoing, Progressing  7/9/2020 1709 by Vielka Avelar RN  Outcome: Ongoing, Progressing  Goal: Absence of Hospital-Acquired Illness or Injury  7/9/2020 1921 by Vielka Avelar RN  Outcome: Ongoing, Progressing  7/9/2020 1709 by Vielka Avelar RN  Outcome: Ongoing, Progressing  Goal: Optimal Comfort and Wellbeing  7/9/2020 1921 by Vielka Avelar RN  Outcome: Ongoing, Progressing  7/9/2020 1709 by Vielka Avelar RN  Outcome: Ongoing, Progressing  Goal: Rounds/Family Conference  Outcome: Ongoing, Progressing     Problem: Fall Injury Risk  Goal: Absence of Fall and Fall-Related Injury  Outcome: Ongoing, Progressing     Problem: Device-Related Complication Risk (Hemodialysis)  Goal: Safe, Effective Therapy Delivery  Outcome: Ongoing, Progressing     Problem: Hemodynamic Instability (Hemodialysis)  Goal: Vital Signs Remain in Desired Range  7/9/2020 1921 by Vielka Avelar RN  Outcome: Ongoing, Progressing  7/9/2020 1709 by Vielka Avelar RN  Outcome: Ongoing, Progressing     Problem: Infection (Hemodialysis)  Goal: Absence of Infection Signs/Symptoms  Outcome: Ongoing, Progressing     Problem: Wound  Goal: Optimal Wound Healing  7/9/2020 1921 by Vielka Avelar RN  Outcome: Ongoing, Not Progressing  7/9/2020 1709 by Vielka Avelar RN  Outcome: Ongoing, " Progressing     Problem: Adult Inpatient Plan of Care  Goal: Readiness for Transition of Care  7/9/2020 1921 by Vielka Avelar, RN  Outcome: Ongoing, Not Progressing  7/9/2020 1709 by Vielka Avelar, RN  Outcome: Ongoing, Progressing

## 2020-07-10 NOTE — PLAN OF CARE
07/09/20 1900   Patient Assessment/Suction   Level of Consciousness (AVPU) alert   Respiratory Effort Normal;Unlabored   PRE-TX-O2   O2 Device (Oxygen Therapy) nasal cannula   Flow (L/min) 3   SpO2 96 %   Pulse Oximetry Type Intermittent   $ Pulse Oximetry - Multiple Charge Pulse Oximetry - Multiple   Aerosol Therapy   $ Aerosol Therapy Charges PRN treatment not required   Inhaler   $ Inhaler Charges PRN treatment not required   Respiratory Evaluation   $ Care Plan Tech Time 15 min   Evaluation For Re-Eval 3 day

## 2020-07-10 NOTE — PROGRESS NOTES
Net uf  3000 mls     07/10/20 1125   Post-Hemodialysis Assessment   Rinseback Volume (mL) 250 mL   Blood Volume Processed (Liters) 53.5 L   Dialyzer Clearance Clear   Duration of Treatment (minutes) 180 minutes   Hemodialysis Intake (mL) 500 mL   Total UF (mL) 3500 mL   Net Fluid Removal 3000   Patient Response to Treatment milagros well   Post-Treatment Weight 37.9 kg (83 lb 8.9 oz)   Treatment Weight Change -3   Post-Hemodialysis Comments treatment complete, pt stable. lines reinfused, needles removed. hemostasis achieved. thrill and bruit present post treatment

## 2020-07-10 NOTE — PLAN OF CARE
07/10/20 1500   Patient Assessment/Suction   Level of Consciousness (AVPU) alert   Respiratory Effort Normal;Unlabored   Expansion/Accessory Muscles/Retractions no use of accessory muscles   All Lung Fields Breath Sounds clear   Rhythm/Pattern, Respiratory no shortness of breath reported   PRE-TX-O2   O2 Device (Oxygen Therapy) room air   SpO2 100 %   Pulse Oximetry Type Intermittent   $ Pulse Oximetry - Multiple Charge Pulse Oximetry - Multiple   Oximetry Probe Site Assessed   Pulse 79   Resp (!) 24   Aerosol Therapy   $ Aerosol Therapy Charges PRN treatment not required  (patient c/o severe pain, informed nurse.)   Respiratory Treatment Status (SVN) PRN treatment not required   Respiratory Evaluation   $ Care Plan Tech Time 15 min

## 2020-07-10 NOTE — NURSING
Attempted to assess patient wounds.  Refuses to allow removal of bandage to assess.  Patient refuses Lovenox,  Re-educated patient on the need/reason for Lovenox.  Continues to refuse.

## 2020-07-10 NOTE — NURSING
Pt says that her wound hurts all the time, pain level 6, but she kashmir dit is a comfortable number for her.    Pt stable. VSS WDL. Pt has no complains at this moment.    Safety precautions implemented. Will continue to monitor.     PS.: when I did her dressing change, pt did not let me clean.

## 2020-07-10 NOTE — PROGRESS NOTES
[facility]  Progress Note  Patient Name: Griselda Neely  MRN: [id number]  Patient Class: IP- Inpatient   Admission Date: 7/7/2020 10:03 AM   Attending Physician:  Yang Royal MD  Primary Care Provider: Kalie Neely MD  Face-to-Face encounter date: 07/10/2020    HISTORY OF PRESENT ILLNESS:   Griselda Neely is a 74 y.o. Black or  female who  has a past medical history of Anemia, Calciphylaxis (07/2017), CHF (congestive heart failure), Encounter for blood transfusion (03/2016), Gout, Hypertension, Mitral valve regurgitation, Osteoarthritis, Pancreatitis, Peripheral vascular disease, Peritoneal dialysis catheter in place, Pneumonia (09/09/2017), and Renal failure.. The patient presented to [facility] on 7/7/2020 with a primary complaint of   History was obtained from the patient and the family present at the bedside and ER physician Sign-out. Patient is referred to the ED by wound care for wound to left medial thigh. The patient reports she has been treated outpatient with antibiotics and she has been having worsening pain to her left leg. She was seen by wound care today and was instructed to come to the ED for further evaluation. She has a history of noncompliance and refuses to have U/S of legs in the ED.  The patient has a history of ESRD on HD. She had her last HD on yesterday. She denies fever, chills, chest pain, sob, vomiting, abdominal pain, diarrhea, dizziness or LOC.   In the ED, VSS. CBC shows anemia that is chronic. H/H stable at baseline. She denies black or bloody stools. CMP was unremarkable.  EKG shows A-fib with controlled rate. No new signs of ischemia. CXR shows cardiomegaly and mild pulmonary edema with trace bilateral pleural effusions. Blood cultures were drawn and the patient was started on IV antibiotics.   Decision to admit was taken and patient was informed about the plan of care.     Interval history:   Today the  patient reports persistent pain to wound of left leg, constant timing, mild to moderate intensity, improved with pain medication, not much changed overall, worse after manipulation by examining physician.  No fever, chest pain, or shortness of breath.    PHYSICAL EXAM:     Vitals Reviewed  General appearance: Well-developed, no apparent distress, thin, nontoxic  Skin: Skin is dry and warm; left leg wound dressed  Neuro:  Nonfocal motor exam, fluent speech, alert and oriented  HENT: Atraumatic head. Moist mucous membranes of oral cavity.  Eyes:  Anicteric sclerae, no conjunctival discharge, PERRLA.  Lungs: Clear to auscultation bilaterally.  Comfortable work of breathing  Heart: Irregularly irregular rhythm.  2+ radial pulses  Abdomen: Soft, non-distended, non-tender.   Psych:  Mood okay, Flat mood and affect.  Insight fair     LABS AND IMAGING:     Labs Reviewed   CBC W/ AUTO DIFFERENTIAL - Abnormal; Notable for the following components:       Result Value    RBC 2.65 (*)     Hemoglobin 7.4 (*)     Hematocrit 25.2 (*)     Mean Corpuscular Hemoglobin Conc 29.4 (*)     RDW 19.2 (*)     Immature Granulocytes 0.6 (*)     Lymph # 0.5 (*)     nRBC 1 (*)     Gran% 77.3 (*)     Lymph% 9.8 (*)     All other components within normal limits   COMPREHENSIVE METABOLIC PANEL - Abnormal; Notable for the following components:    Sodium 133 (*)     Potassium 5.2 (*)     Chloride 94 (*)     BUN, Bld 37 (*)     Creatinine 5.3 (*)     Calcium 8.4 (*)     Albumin 3.0 (*)     Total Bilirubin 1.3 (*)     eGFR if  8.6 (*)     eGFR if non  7.5 (*)     All other components within normal limits   PROTIME-INR - Abnormal; Notable for the following components:    PT 17.7 (*)     All other components within normal limits   TSH   LACTIC ACID, PLASMA   SARS-COV-2 RNA AMPLIFICATION, QUAL       X-Ray Chest AP Portable   Final Result          ASSESSMENT & PLAN:   Calciphylaxis:  Chronic wound to left medial thigh; acute  on chronic pain is concerning for secondary bacterial infection  Admit to Med-tele  Consult wound care  Continue IV Zosyn/Vanc  Blood and wound cultures pending  Prn analgesics  Refused to have ultrasound of legs  AM labs to include phosphorus level    ESRD on HD:  Consult Dr. Ingram for HD MWF  No respiratory distress at this time  CXR with mild pulmonary edema    Anemia:  Chronic; H/H stable at baseline  No signs of acute bleeding at this time; denies black or bloody stools  Follow h/h    Persistent A-fib:  Controlled rate at this time  Continue metoprolol and coumadin (INR 1.5 today)  Daily PT/INR    DVT Prophylaxis: Continue coumadin for DVT prophylaxis and will be advised to be as mobile as possible and sit in a chair as tolerated.     Anemia   Calciphylaxis   both legs   CHF (congestive heart failure)   Encounter for blood transfusion   Gout   Hypertension   Mitral valve regurgitation   Osteoarthritis   Pancreatitis   Peripheral vascular disease   Peritoneal dialysis catheter in place   Pneumonia   Renal failure     Plan update today:  Continue care.  Appreciate consultants.  I discussed the case today with General surgery Dr. Elam  General surgery consultation for possible debridement in near future.  Plan for Monday.  Continue local wound care  Continue hemodialysis per nephrology  Continue sodium thiosulfate with dialysis for calciphylaxis  Continued to hold empiric IV antibiotics.  I am not convinced that the patient's wound is infected.  No episodes of fever or purulent drainage.  Mobilize as able with PT/OT  Moderate-risk patient secondary to moderate exacerbation of chronic illness; prescription drug management  VTE prophylaxis with Lovenox; hold Coumadin due to possible surgery in near future    Yang Royal  [facility] Hospitalist  07/10/2020

## 2020-07-11 LAB
ALBUMIN SERPL BCP-MCNC: 2.7 G/DL (ref 3.5–5.2)
ANION GAP SERPL CALC-SCNC: 12 MMOL/L (ref 8–16)
BUN SERPL-MCNC: 19 MG/DL (ref 8–23)
CALCIUM SERPL-MCNC: 8.4 MG/DL (ref 8.7–10.5)
CHLORIDE SERPL-SCNC: 97 MMOL/L (ref 95–110)
CO2 SERPL-SCNC: 26 MMOL/L (ref 23–29)
CREAT SERPL-MCNC: 4.8 MG/DL (ref 0.5–1.4)
ERYTHROCYTE [DISTWIDTH] IN BLOOD BY AUTOMATED COUNT: 19.3 % (ref 11.5–14.5)
EST. GFR  (AFRICAN AMERICAN): 9.7 ML/MIN/1.73 M^2
EST. GFR  (NON AFRICAN AMERICAN): 8.4 ML/MIN/1.73 M^2
GLUCOSE SERPL-MCNC: 58 MG/DL (ref 70–110)
HCT VFR BLD AUTO: 30.2 % (ref 37–48.5)
HGB BLD-MCNC: 8.9 G/DL (ref 12–16)
INR PPP: 1.8
MCH RBC QN AUTO: 27.6 PG (ref 27–31)
MCHC RBC AUTO-ENTMCNC: 29.5 G/DL (ref 32–36)
MCV RBC AUTO: 94 FL (ref 82–98)
PHOSPHATE SERPL-MCNC: 4.4 MG/DL (ref 2.7–4.5)
PLATELET # BLD AUTO: 225 K/UL (ref 150–350)
PMV BLD AUTO: 11.3 FL (ref 9.2–12.9)
POTASSIUM SERPL-SCNC: 4.3 MMOL/L (ref 3.5–5.1)
PROTHROMBIN TIME: 20.2 SEC (ref 10.6–14.8)
RBC # BLD AUTO: 3.22 M/UL (ref 4–5.4)
SODIUM SERPL-SCNC: 135 MMOL/L (ref 136–145)
WBC # BLD AUTO: 5.94 K/UL (ref 3.9–12.7)

## 2020-07-11 PROCEDURE — 25000003 PHARM REV CODE 250: Performed by: NURSE PRACTITIONER

## 2020-07-11 PROCEDURE — 12000002 HC ACUTE/MED SURGE SEMI-PRIVATE ROOM

## 2020-07-11 PROCEDURE — 97162 PT EVAL MOD COMPLEX 30 MIN: CPT

## 2020-07-11 PROCEDURE — 85027 COMPLETE CBC AUTOMATED: CPT

## 2020-07-11 PROCEDURE — 36415 COLL VENOUS BLD VENIPUNCTURE: CPT

## 2020-07-11 PROCEDURE — 63600175 PHARM REV CODE 636 W HCPCS: Performed by: NURSE PRACTITIONER

## 2020-07-11 PROCEDURE — 27000221 HC OXYGEN, UP TO 24 HOURS

## 2020-07-11 PROCEDURE — 25000003 PHARM REV CODE 250: Performed by: INTERNAL MEDICINE

## 2020-07-11 PROCEDURE — 94761 N-INVAS EAR/PLS OXIMETRY MLT: CPT

## 2020-07-11 PROCEDURE — 85610 PROTHROMBIN TIME: CPT

## 2020-07-11 PROCEDURE — 99900035 HC TECH TIME PER 15 MIN (STAT)

## 2020-07-11 PROCEDURE — 80069 RENAL FUNCTION PANEL: CPT

## 2020-07-11 PROCEDURE — 63600175 PHARM REV CODE 636 W HCPCS: Performed by: INTERNAL MEDICINE

## 2020-07-11 RX ORDER — PHYTONADIONE 5 MG/1
10 TABLET ORAL ONCE
Status: COMPLETED | OUTPATIENT
Start: 2020-07-12 | End: 2020-07-12

## 2020-07-11 RX ADMIN — ASPIRIN 81 MG: 81 TABLET, DELAYED RELEASE ORAL at 08:07

## 2020-07-11 RX ADMIN — ZINC SULFATE 220 MG (50 MG) CAPSULE 220 MG: CAPSULE at 08:07

## 2020-07-11 RX ADMIN — TRAMADOL HYDROCHLORIDE 50 MG: 50 TABLET, FILM COATED ORAL at 11:07

## 2020-07-11 RX ADMIN — MORPHINE SULFATE 2 MG: 2 INJECTION, SOLUTION INTRAMUSCULAR; INTRAVENOUS at 06:07

## 2020-07-11 RX ADMIN — PANTOPRAZOLE SODIUM 40 MG: 40 TABLET, DELAYED RELEASE ORAL at 05:07

## 2020-07-11 RX ADMIN — OXYCODONE AND ACETAMINOPHEN 1 TABLET: 5; 325 TABLET ORAL at 11:07

## 2020-07-11 RX ADMIN — SEVELAMER CARBONATE 1600 MG: 800 TABLET, FILM COATED ORAL at 07:07

## 2020-07-11 RX ADMIN — METOPROLOL TARTRATE 25 MG: 25 TABLET, FILM COATED ORAL at 08:07

## 2020-07-11 RX ADMIN — SEVELAMER CARBONATE 1600 MG: 800 TABLET, FILM COATED ORAL at 11:07

## 2020-07-11 RX ADMIN — ISOSORBIDE MONONITRATE 30 MG: 30 TABLET, EXTENDED RELEASE ORAL at 08:07

## 2020-07-11 RX ADMIN — LOSARTAN POTASSIUM 25 MG: 25 TABLET, FILM COATED ORAL at 08:07

## 2020-07-11 RX ADMIN — OXYCODONE AND ACETAMINOPHEN 1 TABLET: 5; 325 TABLET ORAL at 08:07

## 2020-07-11 RX ADMIN — SEVELAMER CARBONATE 1600 MG: 800 TABLET, FILM COATED ORAL at 05:07

## 2020-07-11 RX ADMIN — MAGNESIUM OXIDE 400 MG: 400 TABLET ORAL at 08:07

## 2020-07-11 RX ADMIN — DILTIAZEM HYDROCHLORIDE 180 MG: 180 CAPSULE, COATED, EXTENDED RELEASE ORAL at 08:07

## 2020-07-11 NOTE — PROGRESS NOTES
Counts include 234 beds at the [facility] Medicine  Progress Note  Patient Name: Griselda Neely  MRN: [id number]  Patient Class: IP- Inpatient   Admission Date: 7/7/2020 10:03 AM   Attending Physician:  Yang Royal MD  Primary Care Provider: Kalie Neely MD  Face-to-Face encounter date: 07/11/2020    HISTORY OF PRESENT ILLNESS:   Griselda Neely is a 74 y.o. Black or  female who  has a past medical history of Anemia, Calciphylaxis (07/2017), CHF (congestive heart failure), Encounter for blood transfusion (03/2016), Gout, Hypertension, Mitral valve regurgitation, Osteoarthritis, Pancreatitis, Peripheral vascular disease, Peritoneal dialysis catheter in place, Pneumonia (09/09/2017), and Renal failure.. The patient presented to [facility] on 7/7/2020 with a primary complaint of   History was obtained from the patient and the family present at the bedside and ER physician Sign-out. Patient is referred to the ED by wound care for wound to left medial thigh. The patient reports she has been treated outpatient with antibiotics and she has been having worsening pain to her left leg. She was seen by wound care today and was instructed to come to the ED for further evaluation. She has a history of noncompliance and refuses to have U/S of legs in the ED.  The patient has a history of ESRD on HD. She had her last HD on yesterday. She denies fever, chills, chest pain, sob, vomiting, abdominal pain, diarrhea, dizziness or LOC.   In the ED, VSS. CBC shows anemia that is chronic. H/H stable at baseline. She denies black or bloody stools. CMP was unremarkable.  EKG shows A-fib with controlled rate. No new signs of ischemia. CXR shows cardiomegaly and mild pulmonary edema with trace bilateral pleural effusions. Blood cultures were drawn and the patient was started on IV antibiotics.   Decision to admit was taken and patient was informed about the plan of care.     Interval history:   Today the  patient reports persistent pain to wound of left leg, constant timing, mild intensity, control with pain medication. No fever, chest pain, or shortness of breath.  Eating okay.    PHYSICAL EXAM:     Vitals Reviewed  General appearance: Well-developed, no apparent distress, thin, nontoxic  Skin: Skin is dry and warm; left leg wound dressed  HENT: Atraumatic head. Moist mucous membranes of oral cavity.  Lungs: Clear to auscultation bilaterally.  Comfortable work of breathing  Heart: Irregularly irregular rhythm.  2+ radial pulses  Abdomen: Soft, non-distended, non-tender.   Psych:  Mood okay, Flat mood and affect.  Insight fair     LABS AND IMAGING:     INR 1.8  Creatinine 4.8    ASSESSMENT & PLAN:   Calciphylaxis:  Chronic wound to left medial thigh; acute on chronic pain is concerning for secondary bacterial infection  Admit to Med-tele  Consult wound care  Continue IV Zosyn/Vanc  Blood and wound cultures pending  Prn analgesics  Refused to have ultrasound of legs  AM labs to include phosphorus level    ESRD on HD:  Consult Dr. Ingram for HD MWF  No respiratory distress at this time  CXR with mild pulmonary edema    Anemia:  Chronic; H/H stable at baseline  No signs of acute bleeding at this time; denies black or bloody stools  Follow h/h    Persistent A-fib:  Controlled rate at this time  Continue metoprolol and coumadin (INR 1.5 today)  Daily PT/INR    DVT Prophylaxis: Continue coumadin for DVT prophylaxis and will be advised to be as mobile as possible and sit in a chair as tolerated.     Anemia   Calciphylaxis   both legs   CHF (congestive heart failure)   Encounter for blood transfusion   Gout   Hypertension   Mitral valve regurgitation   Osteoarthritis   Pancreatitis   Peripheral vascular disease   Peritoneal dialysis catheter in place   Pneumonia   Renal failure     Plan update today:  Continue care.  Appreciate consultants.   General surgery consultation for possible debridement in near future.  Plan for  Monday.  NPO after midnight Sunday  Continue local wound care  Continue hemodialysis per nephrology  Continue sodium thiosulfate with dialysis for calciphylaxis  Continued to hold empiric IV antibiotics.  I am not convinced that the patient's wound is infected.  No episodes of fever or purulent drainage.  Mobilize as able with PT/OT  Moderate-risk patient secondary to moderate exacerbation of chronic illness; prescription drug management  VTE prophylaxis with Lovenox; hold Coumadin due to possible surgery in near future - vitamin K 10 mg p.o. x1 as INR remains well with the    Yang Royal  [facility] Hospitalist  07/11/2020

## 2020-07-11 NOTE — PLAN OF CARE
Problem: Wound  Goal: Optimal Wound Healing  Outcome: Ongoing, Progressing  Intervention: Promote Effective Wound Healing  Flowsheets (Taken 7/11/2020 0811)  Oral Nutrition Promotion: calorie dense liquids provided   Added Nepro TID to aid in meeting estimated needs.

## 2020-07-11 NOTE — PROGRESS NOTES
"[facility]  Adult Nutrition   Progress Note (Initial Assessment)     SUMMARY     * Note: Assessment completed remotely by review of EMR and with assistance by registered dietitians and other healthcare team members on premises as needed.*    Recommendations/Interventions:    Recommendation/Intervention: 1. Continue current diet as tolerated, encourage intake. 2. Added Nepro TID to aid in meeting estimated needs. 3.  to assist in meal choices daily.  Goals: 1. Patient to meet at least 75% of estimated needs via PO intake of meals and supplements.  Nutrition Goal Status: new    Dietitian Rounds Brief:  · Seen 2' LOS. 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 Debridement planned for Monday. Patient with hx of malnutrition and poor PO intake-added ONS to increase intake and aid in meeting protein needs to assist in wound healing. Encourage intake of meals and provide assistance when needed.  Reason for Assessment  Reason For Assessment: length of stay  Diagnosis: infection/sepsis  Relevant Medical History: CHF, HTN, ,itral valve regurgitation, ESRD-PD, anemia, malnutrition  Interdisciplinary Rounds: did not attend    Nutrition Risk Screen  Nutrition Risk Screen: large or nonhealing wound, burn or pressure injury    Nutrition/Diet History  Food Allergies: NKFA  Factors Affecting Nutritional Intake: decreased appetite    Anthropometrics  Temp: 97.6 °F (36.4 °C)  Height Method: Stated  Height: 5' 5" (165.1 cm)  Height (inches): 65 in  Weight Method: Bed Scale  Weight: 40.9 kg (90 lb 2.7 oz)  Weight (lb): 90.17 lb  Ideal Body Weight (IBW), Female: 125 lb  % Ideal Body Weight, Female (lb): 100.53 %  BMI (Calculated): 15  BMI Grade: less than 16 protein-energy malnutrition grade III       Weight History:  Wt Readings from Last 10 Encounters:   07/10/20 40.9 kg (90 lb 2.7 oz)   06/14/20 54.8 kg (120 lb 13 oz)   06/05/20 61.2 kg (134 lb 14.7 " oz)   06/03/20 69.4 kg (153 lb)   05/12/20 65 kg (143 lb 4.8 oz)   05/01/20 65.8 kg (145 lb)   04/23/20 62.6 kg (138 lb 0.1 oz)   04/14/20 59.3 kg (130 lb 11.2 oz)   03/19/20 58.7 kg (129 lb 6.4 oz)   02/19/20 (P) 64 kg (141 lb)     Lab/Procedures/Meds: Pertinent Labs Reviewed  Clinical Chemistry:  Recent Labs   Lab 07/08/20  0611 07/09/20  0516 07/10/20  0435 07/11/20  0517    135* 137 135*   K 5.6* 3.9 4.5 4.3   CL 96 99 99 97   CO2 26 25 26 26   GLU 56* 123* 65* 58*   BUN 42* 24* 28* 19   CREATININE 6.1* 4.7* 5.9* 4.8*   CALCIUM 8.2* 7.5* 8.1* 8.4*   PROT 6.9  --   --   --    ALBUMIN 2.7* 2.5* 2.7* 2.7*   BILITOT 0.9  --   --   --    ALKPHOS 110  --   --   --    AST 26  --   --   --    ALT 17  --   --   --    ANIONGAP 15 11 12 12   ESTGFRAFRICA 7.3* 9.9* 7.6* 9.7*   EGFRNONAA 6.3* 8.6* 6.6* 8.4*   MG 2.5  --   --   --    PHOS 6.3* 4.7* 5.3* 4.4     CBC:   Recent Labs   Lab 07/11/20  0517   WBC 5.94   RBC 3.22*   HGB 8.9*   HCT 30.2*      MCV 94   MCH 27.6   MCHC 29.5*     Thyroid & Parathyroid:  Recent Labs   Lab 07/07/20  1040   TSH 3.920     Medications: Pertinent Medications reviewed  Scheduled Meds:   sodium chloride 0.9%   Intravenous Once    aspirin  81 mg Oral Daily    collagenase   Topical (Top) Daily    diltiaZEM  180 mg Oral Daily    enoxparin  30 mg Subcutaneous Q24H    epoetin alfonzo-epbx  50 Units/kg Intravenous Every Mon, Wed, Fri    isosorbide mononitrate  30 mg Oral Daily    losartan  25 mg Oral Daily    magnesium oxide  400 mg Oral Daily    metoprolol tartrate  25 mg Oral BID    pantoprazole  40 mg Oral BID    sevelamer carbonate  1,600 mg Oral TID WM    zinc sulfate  220 mg Oral Daily     Continuous Infusions:  PRN Meds:.sodium chloride 0.9%, acetaminophen, albuterol sulfate, morphine, ondansetron, ondansetron, oxyCODONE-acetaminophen, senna-docusate 8.6-50 mg, sodium chloride 0.9%, traMADoL    Estimated/Assessed Needs    Weight Used For Calorie Calculations: 40.9 kg (90  lb 2.7 oz)  Energy Calorie Requirements (kcal): 9601-3071 kcals/day (40-45 kcals/kg)  Energy Need Method: Kcal/kg  Protein Requirements: 62-82 g/day (1.5-2.0 g/kg)  Weight Used For Protein Calculations: 40.9 kg (90 lb 2.7 oz)          Nutrition Prescription Ordered    Current Diet Order: Renal Diet  Nutrition Order Comments: NPO after midnight    Evaluation of Received Nutrient/Fluid Intake    Energy Calories Required: not meeting needs  Protein Required: not meeting needs  Fluid Required: meeting needs  Tolerance: tolerating  % Intake of Estimated Energy Needs: 0 - 25 %  % Meal Intake: 0 - 25 %    Intake/Output Summary (Last 24 hours) at 7/11/2020 0807  Last data filed at 7/10/2020 1125  Gross per 24 hour   Intake 500 ml   Output 3500 ml   Net -3000 ml      Nutrition Risk    Level of Risk/Frequency of Follow-up: moderate - high   Monitor and Evaluation    Food and Nutrient Intake: energy intake, food and beverage intake  Food and Nutrient Adminstration: diet order  Physical Activity and Function: nutrition-related ADLs and IADLs, factors affecting access to physical activity  Anthropometric Measurements: weight, weight change, body mass index  Biochemical Data, Medical Tests and Procedures: electrolyte and renal panel, lipid profile, glucose/endocrine profile, inflammatory profile, gastrointestinal profile  Nutrition-Focused Physical Findings: overall appearance     Nutrition Follow-Up    RD Follow-up?: Yes  Chelo Asif RD 07/11/2020 8:10 AM

## 2020-07-11 NOTE — PLAN OF CARE
07/11/20 0744   Patient Assessment/Suction   Level of Consciousness (AVPU) alert   Respiratory Effort Unlabored   Expansion/Accessory Muscles/Retractions no use of accessory muscles   All Lung Fields Breath Sounds clear   Rhythm/Pattern, Respiratory no shortness of breath reported;unlabored;pattern regular;depth regular   PRE-TX-O2   O2 Device (Oxygen Therapy) nasal cannula   $ Is the patient on Low Flow Oxygen? Yes   Flow (L/min) 3  (Pt reports home oxygen 3-4 LPM)   SpO2 96 %   Pulse Oximetry Type Intermittent   $ Pulse Oximetry - Multiple Charge Pulse Oximetry - Multiple   Pulse 81   Resp 16   Aerosol Therapy   $ Aerosol Therapy Charges PRN treatment not required   Respiratory Evaluation   $ Care Plan Tech Time 15 min

## 2020-07-11 NOTE — NURSING
Pt has been refusing to have her dressing changed even after I told her that I was going  to give her pain med.    Pt stable. VSS WDL. Pt has no pain, complains or stress at the moment. Her son has been in the room w/ her most of the time.     Safety precautions implemented. Will continue to monitor.

## 2020-07-11 NOTE — PLAN OF CARE
Problem: Physical Therapy Goal  Goal: Physical Therapy Goal  Description: Goals to be met by: 2020     Patient will increase functional independence with mobility by performin. Supine to sit with Contact Guard Assistance  2. Sit to stand transfer with Contact Guard Assistance  3. Bed to chair transfer with Contact Guard Assistance using Rolling Walker  4. Gait  x 150 feet with Contact Guard Assistance using Rolling Walker.   5. Lower extremity exercise program x20 reps     Outcome: Ongoing, Progressing   PT eval initiated. Pt keeping LLE flexed - calciphylaxis L posterior thigh wound with dressing. Pt completed few reps of thera ex. Pt ambulatory to bathroom with assist from kenna

## 2020-07-11 NOTE — NURSING
Pt did not let me touch her dressing today. She said it hurst too much, I said that I was going to give pain med, but she still refused.

## 2020-07-11 NOTE — PT/OT/SLP EVAL
Physical Therapy Evaluation    Patient Name:  Griselda Neely   MRN:  [id number]    Recommendations:     Discharge Recommendations:  LTACH (long-term acute care hospital), home health PT   Discharge Equipment Recommendations: none   Barriers to discharge: None    Assessment:     Griselda Neely is a 74 y.o. 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  She presents with the following impairments/functional limitations:  impaired self care skills, decreased lower extremity function, pain, decreased ROM, impaired skin, impaired cardiopulmonary response to activity . Pt appears set in ways. Son from [facility] at bedside and assisting with care. Pt stated of being ambulatory to bathroom with assist from son. Stated will not use commode or bedpan. Insisting that she is able to move around. Pt seen for thera ex and education. Pt kept LLE protected and flexed as she does not want pressure on posterior thigh wound . Pt stated for OR debridement on 07-13- pt to benefit from LTAC vs HHPT/wound care nurse    Rehab Prognosis: Fair; patient would benefit from acute skilled PT services to address these deficits and reach maximum level of function.    Recent Surgery: * No surgery found *      Plan:     During this hospitalization, patient to be seen 5 x/week to address the identified rehab impairments via gait training, therapeutic activities, therapeutic exercises and progress toward the following goals:    · Plan of Care Expires:  07/30/20    Subjective   Pt agreeable to few reps exercises only  Offered gait to bathroom but declined  Son stated he came down from [facility] to help her out and that she lived at home with her sister/bro in law  Son stated pt able to walk around house and to car  Uses scooter when at [facility]- pt O2 dependent at 4/5 liters  Chief Complaint: don't want to move much because I'll be hurting  Patient/Family Comments/goals:  ""surgery on Monday then home"  Pain/Comfort:  · Pain Rating 1: (did not rate)  · Location - Side 1: Left  · Location - Orientation 1: posterior  · Location 1: thigh  · Pain Addressed 1: Pre-medicate for activity, Reposition, Distraction    Patients cultural, spiritual, Catholic conflicts given the current situation:      Living Environment:  Home with sister and bro in law  Has steps/ramp  Prior to admission, patients level of function was ambulatory/O2 dependent/MWF HD.  Equipment used at home: wheelchair, walker, rolling.  DME owned (not currently used): none.  Upon discharge, patient will have assistance from family.    Objective:     Communicated with nurse Mendez prior to session.  Patient found HOB elevated with oxygen  upon PT entry to room.    General Precautions: Standard, fall, respiratory   Orthopedic Precautions:N/A   Braces: N/A     Exams:  · Skin Integrity/Edema:      · -       L posterior thigh wound with dressing  · RLE ROM: WFL  · RLE Strength: WFL  · LLE ROM: Deficits: keeping LLE flexed- able to extend  · LLE Strength: Deficits: 3/5    Functional Mobility:  · Bed Mobility:     · Rolling Left:  supervision  · Rolling Right: supervision  · Scooting: minimum assistance    Therapeutic Activities and Exercises:   thera ex with AP,SLR, attempted QS  Evaluation with lots of diversions  Pt on phone and playing [employer] 6 CLICK MOBILITY  Total Score:16     Patient left HOB elevated with all lines intact, call button in reach and son left to shower.    GOALS:   Multidisciplinary Problems     Physical Therapy Goals        Problem: Physical Therapy Goal    Goal Priority Disciplines Outcome Goal Variances Interventions   Physical Therapy Goal     PT, PT/OT Ongoing, Progressing     Description: Goals to be met by: 2020     Patient will increase functional independence with mobility by performin. Supine to sit with Contact Guard Assistance  2. Sit to stand transfer with Contact Guard " Assistance  3. Bed to chair transfer with Contact Guard Assistance using Rolling Walker  4. Gait  x 150 feet with Contact Guard Assistance using Rolling Walker.   5. Lower extremity exercise program x20 reps                      History:     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    both legs    CHF (congestive heart failure)     Encounter for blood transfusion 03/2016    Gout     Hypertension     Mitral valve regurgitation     Osteoarthritis     Pancreatitis     Peripheral vascular disease     Peritoneal dialysis catheter in place     Pneumonia 09/09/2017    Renal failure        Past Surgical History:   Procedure Laterality Date    ACHILLES TENDON SURGERY Right     ANGIOGRAPHY OF ARTERIOVENOUS SHUNT Right 1/7/2020    Procedure: Fistulogram with Possible Intervention;  Surgeon: Joseph Loyola MD;  Location: [facility] CATH/EP LAB;  Service: Cardiology;  Laterality: Right;    ANGIOGRAPHY OF LOWER EXTREMITY Left 3/18/2020    Procedure: Angiogram Extremity Unilateral;  Surgeon: Ali Khoobehi, MD;  Location: [facility] CATH/EP LAB;  Service: Cardiology;  Laterality: Left;    APPENDECTOMY      CARDIAC CATHETERIZATION  07/03/2017    CHOLECYSTECTOMY      heal surgery Right     HYSTERECTOMY      INSERTION OF STENT INTO PERIPHERAL VESSEL N/A 1/7/2020    Procedure: INSERTION, STENT, VESSEL, PERIPHERAL;  Surgeon: Joseph Loyola MD;  Location: [facility] CATH/EP LAB;  Service: Cardiology;  Laterality: N/A;    PARATHYROIDECTOMY      PARATHYROIDECTOMY  07/13/2017    PERCUTANEOUS TRANSLUMINAL ANGIOPLASTY OF ARTERIOVENOUS FISTULA N/A 1/7/2020    Procedure: PTA, AV FISTULA;  Surgeon: Joseph Loyola MD;  Location: [facility] CATH/EP LAB;  Service: Cardiology;  Laterality: N/A;    PERITONEAL CATHETER INSERTION      RENAL BIOPSY      vocal cord nodule         Time Tracking:     PT Received On: 07/11/20  PT Start Time: 0901     PT Stop Time: 0924  PT Total Time (min): 23 min     Billable  Minutes: Evaluation 23      Nancy Quijano, PT  07/11/2020

## 2020-07-11 NOTE — PT/OT/SLP PROGRESS
Occupational Therapy      Patient Name:  Griselda Neely   MRN:  [id number]    Patient not seen today secondary to refusal despite encouragment.  She denies any difficulty with ADLs and declines further OT services.  Orders discontinued.      Jayy Collins, OT  7/11/2020

## 2020-07-11 NOTE — CONSULTS
[facility]  General Surgery  Consult Note    Inpatient consult to General Surgery  Consult performed by: Carlos Elam III, MD  Consult ordered by: Yang Royal MD  Reason for consult: chronic calciphylaxis wound of left leg         Subjective:     Chief Complaint/Reason for Admission: chronic calciphylaxis wound of left leg     History of Present Illness: Patient is 74-year-old female with end-stage renal disease on dialysis and known calciphylaxis with chronic left thigh wound.  Patient states she has been dealing with the wound for a long time.  It has become acutely more painful.  Patient states that she can barely stand anything to touch it.  She states she has had debridement of the wound in the past.  She is afebrile.  She is hemodynamically stable.  She has an extensive past medical history below.  She was recently on Coumadin which has been stopped on admission.  The last INR is 1.8    No current facility-administered medications on file prior to encounter.      Current Outpatient Medications on File Prior to Encounter   Medication Sig    aspirin (ECOTRIN) 81 MG EC tablet Take 81 mg by mouth once daily.    calcium acetate (PHOSLO) 667 mg capsule Take 667 mg by mouth once daily.     diltiaZEM (CARDIZEM CD) 180 MG 24 hr capsule Take 180 mg by mouth once daily.    folic acid/vit B complex and C (JENNIFER-KODAK ORAL) Take 1 tablet by mouth once daily.    isosorbide mononitrate (IMDUR) 30 MG 24 hr tablet Take 30 mg by mouth once daily.    losartan (COZAAR) 25 MG tablet Take 25 mg by mouth once daily.    magnesium oxide (MAG-OX) 400 mg (241.3 mg magnesium) tablet Take 400 mg by mouth once daily.    metoprolol tartrate (LOPRESSOR) 25 MG tablet Take 25 mg by mouth 2 (two) times daily.    oxyCODONE-acetaminophen (PERCOCET) 5-325 mg per tablet Take 1-2 tablets by mouth daily as needed for Pain (on dialysis days).    traMADoL (ULTRAM) 50 mg tablet Take 50 mg by mouth every 8 (eight)  hours as needed for Pain.    warfarin (COUMADIN) 3 MG tablet Take 3 mg by mouth every evening.    zinc sulfate 220 mg Tab tablet Take 220 mg by mouth every other day.    mupirocin (BACTROBAN) 2 % ointment Apply topically 3 (three) times daily. (Patient taking differently: Apply 1 g topically 3 (three) times daily. )    pantoprazole (PROTONIX) 40 MG tablet Take 40 mg by mouth 2 (two) times daily.       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    both legs    CHF (congestive heart failure)     Encounter for blood transfusion 03/2016    Gout     Hypertension     Mitral valve regurgitation     Osteoarthritis     Pancreatitis     Peripheral vascular disease     Peritoneal dialysis catheter in place     Pneumonia 09/09/2017    Renal failure      Past Surgical History:   Procedure Laterality Date    ACHILLES TENDON SURGERY Right     ANGIOGRAPHY OF ARTERIOVENOUS SHUNT Right 1/7/2020    Procedure: Fistulogram with Possible Intervention;  Surgeon: Joseph Loyola MD;  Location: [facility] CATH/EP LAB;  Service: Cardiology;  Laterality: Right;    ANGIOGRAPHY OF LOWER EXTREMITY Left 3/18/2020    Procedure: Angiogram Extremity Unilateral;  Surgeon: Ali Khoobehi, MD;  Location: [facility] CATH/EP LAB;  Service: Cardiology;  Laterality: Left;    APPENDECTOMY      CARDIAC CATHETERIZATION  07/03/2017    CHOLECYSTECTOMY      heal surgery Right     HYSTERECTOMY      INSERTION OF STENT INTO PERIPHERAL VESSEL N/A 1/7/2020    Procedure: INSERTION, STENT, VESSEL, PERIPHERAL;  Surgeon: Joseph Loyola MD;  Location: [facility] CATH/EP LAB;  Service: Cardiology;  Laterality: N/A;    PARATHYROIDECTOMY      PARATHYROIDECTOMY  07/13/2017    PERCUTANEOUS TRANSLUMINAL ANGIOPLASTY OF ARTERIOVENOUS FISTULA N/A 1/7/2020    Procedure: PTA, AV FISTULA;  Surgeon: Joseph Loyola MD;  Location: [facility] CATH/EP LAB;  Service: Cardiology;   Laterality: N/A;    PERITONEAL CATHETER INSERTION      RENAL BIOPSY      vocal cord nodule       Family History     Problem Relation (Age of Onset)    Arthritis Mother    Diabetes Mother, Father    Early death Sister, Sister    Heart disease Sister, Maternal Grandfather, Mother    Heart failure Mother    Hypertension Mother        Tobacco Use    Smoking status: Current Some Day Smoker     Packs/day: 0.50     Years: 45.00     Pack years: 22.50     Types: Cigarettes    Smokeless tobacco: Never Used   Substance and Sexual Activity    Alcohol use: No    Drug use: No    Sexual activity: Not on file     Review of Systems   Constitutional: Negative for appetite change, chills, fever and unexpected weight change.   HENT: Negative for hearing loss, rhinorrhea, sore throat and voice change.    Eyes: Negative for photophobia and visual disturbance.   Respiratory: Negative for cough, choking and shortness of breath.    Cardiovascular: Negative for chest pain, palpitations and leg swelling.   Gastrointestinal: Negative for abdominal pain, blood in stool, constipation, diarrhea, nausea and vomiting.   Endocrine: Negative for cold intolerance, heat intolerance, polydipsia and polyuria.   Musculoskeletal: Negative for arthralgias, back pain, joint swelling and neck stiffness.   Skin: Positive for wound. Negative for color change, pallor and rash.   Neurological: Negative for dizziness, seizures, syncope and headaches.   Hematological: Negative for adenopathy. Does not bruise/bleed easily.   Psychiatric/Behavioral: Negative for agitation, behavioral problems and confusion.     Objective:     Vital Signs (Most Recent):  Temp: 98.2 °F (36.8 °C) (07/10/20 2008)  Pulse: 88 (07/10/20 2008)  Resp: 18 (07/10/20 2050)  BP: (!) 153/76 (07/10/20 2008)  SpO2: 100 % (07/10/20 2008) Vital Signs (24h Range):  Temp:  [97.5 °F (36.4 °C)-98.2 °F (36.8 °C)] 98.2 °F (36.8 °C)  Pulse:  [55-88] 88  Resp:  [16-24] 18  SpO2:  [98 %-100 %] 100  %  BP: (120-157)/(57-97) 153/76     Weight: 40.9 kg (90 lb 2.7 oz)  Body mass index is 15 kg/m².      Intake/Output Summary (Last 24 hours) at 7/10/2020 2125  Last data filed at 7/10/2020 1125  Gross per 24 hour   Intake 500 ml   Output 3500 ml   Net -3000 ml       Physical Exam  Constitutional:       General: She is not in acute distress.  Neck:      Musculoskeletal: Neck supple.      Trachea: Phonation normal.   Pulmonary:      Effort: Pulmonary effort is normal. No tachypnea, bradypnea or respiratory distress.   Abdominal:      General: There is no distension.      Palpations: Abdomen is soft.      Tenderness: There is no abdominal tenderness. There is no guarding or rebound.   Skin:            Comments: Large wound with irregular borders in the left mid medial thigh. It is ulcerated with subcutaneous tissue exposed. Does not appear to be infected. It is extremely tender. Difficult to examine due to pain.    Neurological:      Mental Status: She is alert and oriented to person, place, and time.         Significant Labs:  CBC:   Recent Labs   Lab 07/10/20  0435   WBC 5.38   RBC 2.97*   HGB 8.3*   HCT 27.8*      MCV 94   MCH 27.9   MCHC 29.9*     CMP:   Recent Labs   Lab 07/10/20  0435   GLU 65*   CALCIUM 8.1*   ALBUMIN 2.7*      K 4.5   CO2 26   CL 99   BUN 28*   CREATININE 5.9*       Significant Diagnostics:  I have reviewed all pertinent imaging results/findings within the past 24 hours.    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  It is extremely painful.  It does not appear to be infected. It is difficult to examine die to her severe pain. Will plan for exam under anesthesia on  Monday. Debridement or excision of the palpable calcium deposits may help with her pain.     Thank you for your consult.     Carlos Elam III, MD  General Surgery  [facility]

## 2020-07-12 LAB
ALBUMIN SERPL BCP-MCNC: 2.8 G/DL (ref 3.5–5.2)
ANION GAP SERPL CALC-SCNC: 14 MMOL/L (ref 8–16)
BACTERIA BLD CULT: NORMAL
BACTERIA BLD CULT: NORMAL
BUN SERPL-MCNC: 29 MG/DL (ref 8–23)
CALCIUM SERPL-MCNC: 8.7 MG/DL (ref 8.7–10.5)
CHLORIDE SERPL-SCNC: 95 MMOL/L (ref 95–110)
CO2 SERPL-SCNC: 25 MMOL/L (ref 23–29)
CREAT SERPL-MCNC: 6.4 MG/DL (ref 0.5–1.4)
EST. GFR  (AFRICAN AMERICAN): 6.8 ML/MIN/1.73 M^2
EST. GFR  (NON AFRICAN AMERICAN): 5.9 ML/MIN/1.73 M^2
GLUCOSE SERPL-MCNC: 56 MG/DL (ref 70–110)
INR PPP: 1.7
PHOSPHATE SERPL-MCNC: 5.3 MG/DL (ref 2.7–4.5)
POTASSIUM SERPL-SCNC: 5 MMOL/L (ref 3.5–5.1)
PROTHROMBIN TIME: 19.5 SEC (ref 10.6–14.8)
SODIUM SERPL-SCNC: 134 MMOL/L (ref 136–145)

## 2020-07-12 PROCEDURE — 99900035 HC TECH TIME PER 15 MIN (STAT)

## 2020-07-12 PROCEDURE — 25000003 PHARM REV CODE 250: Performed by: INTERNAL MEDICINE

## 2020-07-12 PROCEDURE — 63600175 PHARM REV CODE 636 W HCPCS: Performed by: NURSE PRACTITIONER

## 2020-07-12 PROCEDURE — 25000003 PHARM REV CODE 250: Performed by: NURSE PRACTITIONER

## 2020-07-12 PROCEDURE — 80069 RENAL FUNCTION PANEL: CPT

## 2020-07-12 PROCEDURE — 94761 N-INVAS EAR/PLS OXIMETRY MLT: CPT

## 2020-07-12 PROCEDURE — 36415 COLL VENOUS BLD VENIPUNCTURE: CPT

## 2020-07-12 PROCEDURE — 12000002 HC ACUTE/MED SURGE SEMI-PRIVATE ROOM

## 2020-07-12 PROCEDURE — 85610 PROTHROMBIN TIME: CPT

## 2020-07-12 PROCEDURE — 27000221 HC OXYGEN, UP TO 24 HOURS

## 2020-07-12 RX ORDER — OXYCODONE HYDROCHLORIDE 5 MG/1
10 TABLET ORAL ONCE
Status: COMPLETED | OUTPATIENT
Start: 2020-07-12 | End: 2020-07-12

## 2020-07-12 RX ORDER — PHYTONADIONE 5 MG/1
10 TABLET ORAL ONCE
Status: COMPLETED | OUTPATIENT
Start: 2020-07-12 | End: 2020-07-12

## 2020-07-12 RX ADMIN — PANTOPRAZOLE SODIUM 40 MG: 40 TABLET, DELAYED RELEASE ORAL at 06:07

## 2020-07-12 RX ADMIN — TRAMADOL HYDROCHLORIDE 50 MG: 50 TABLET, FILM COATED ORAL at 09:07

## 2020-07-12 RX ADMIN — METOPROLOL TARTRATE 25 MG: 25 TABLET, FILM COATED ORAL at 08:07

## 2020-07-12 RX ADMIN — LOSARTAN POTASSIUM 25 MG: 25 TABLET, FILM COATED ORAL at 09:07

## 2020-07-12 RX ADMIN — ASPIRIN 81 MG: 81 TABLET, DELAYED RELEASE ORAL at 09:07

## 2020-07-12 RX ADMIN — MAGNESIUM OXIDE 400 MG: 400 TABLET ORAL at 09:07

## 2020-07-12 RX ADMIN — OXYCODONE AND ACETAMINOPHEN 1 TABLET: 5; 325 TABLET ORAL at 09:07

## 2020-07-12 RX ADMIN — ZINC SULFATE 220 MG (50 MG) CAPSULE 220 MG: CAPSULE at 09:07

## 2020-07-12 RX ADMIN — PHYTONADIONE 10 MG: 5 TABLET ORAL at 01:07

## 2020-07-12 RX ADMIN — SEVELAMER CARBONATE 1600 MG: 800 TABLET, FILM COATED ORAL at 04:07

## 2020-07-12 RX ADMIN — MORPHINE SULFATE 2 MG: 2 INJECTION, SOLUTION INTRAMUSCULAR; INTRAVENOUS at 03:07

## 2020-07-12 RX ADMIN — PANTOPRAZOLE SODIUM 40 MG: 40 TABLET, DELAYED RELEASE ORAL at 04:07

## 2020-07-12 RX ADMIN — ISOSORBIDE MONONITRATE 30 MG: 30 TABLET, EXTENDED RELEASE ORAL at 09:07

## 2020-07-12 RX ADMIN — OXYCODONE HYDROCHLORIDE 10 MG: 5 TABLET ORAL at 02:07

## 2020-07-12 RX ADMIN — OXYCODONE AND ACETAMINOPHEN 1 TABLET: 5; 325 TABLET ORAL at 06:07

## 2020-07-12 RX ADMIN — MORPHINE SULFATE 2 MG: 2 INJECTION, SOLUTION INTRAMUSCULAR; INTRAVENOUS at 01:07

## 2020-07-12 RX ADMIN — METOPROLOL TARTRATE 25 MG: 25 TABLET, FILM COATED ORAL at 09:07

## 2020-07-12 RX ADMIN — TRAMADOL HYDROCHLORIDE 50 MG: 50 TABLET, FILM COATED ORAL at 03:07

## 2020-07-12 RX ADMIN — DILTIAZEM HYDROCHLORIDE 180 MG: 180 CAPSULE, COATED, EXTENDED RELEASE ORAL at 09:07

## 2020-07-12 RX ADMIN — PHYTONADIONE 10 MG: 5 TABLET ORAL at 02:07

## 2020-07-12 NOTE — PROGRESS NOTES
[facility] Medicine  Progress Note  Patient Name: Griselda Neely  MRN: [id number]  Patient Class: IP- Inpatient   Admission Date: 7/7/2020 10:03 AM   Attending Physician:  Yang Royal MD  Primary Care Provider: Kalie Neely MD  Face-to-Face encounter date: 07/12/2020    HISTORY OF PRESENT ILLNESS:   Griselda Neely is a 74 y.o. Black or  female who  has a past medical history of Anemia, Calciphylaxis (07/2017), CHF (congestive heart failure), Encounter for blood transfusion (03/2016), Gout, Hypertension, Mitral valve regurgitation, Osteoarthritis, Pancreatitis, Peripheral vascular disease, Peritoneal dialysis catheter in place, Pneumonia (09/09/2017), and Renal failure.. The patient presented to [facility] license of [facility] on 7/7/2020 with a primary complaint of   History was obtained from the patient and the family present at the bedside and ER physician Sign-out. Patient is referred to the ED by wound care for wound to left medial thigh. The patient reports she has been treated outpatient with antibiotics and she has been having worsening pain to her left leg. She was seen by wound care today and was instructed to come to the ED for further evaluation. She has a history of noncompliance and refuses to have U/S of legs in the ED.  The patient has a history of ESRD on HD. She had her last HD on yesterday. She denies fever, chills, chest pain, sob, vomiting, abdominal pain, diarrhea, dizziness or LOC.   In the ED, VSS. CBC shows anemia that is chronic. H/H stable at baseline. She denies black or bloody stools. CMP was unremarkable.  EKG shows A-fib with controlled rate. No new signs of ischemia. CXR shows cardiomegaly and mild pulmonary edema with trace bilateral pleural effusions. Blood cultures were drawn and the patient was started on IV antibiotics.   Decision to admit was taken and patient was informed about the plan of care.     Interval history:   Today the  patient reports worsening pain to wound of left leg, constant timing, severe intensity today, worse today despite oral pain medicine. No fever, chest pain, or shortness of breath.  Eating okay.    PHYSICAL EXAM:     Vitals Reviewed  General appearance: Well-developed, no apparent distress, thin, nontoxic  Skin: Skin is dry and warm; left leg wound dressed  HENT: Atraumatic head. Moist mucous membranes of oral cavity.  Lungs: Clear to auscultation bilaterally.  Comfortable work of breathing  Heart: Irregularly irregular rhythm.  2+ radial pulses  Abdomen: Soft, non-distended, non-tender.   Psych:  Mood okay, Flat mood and affect.  Insight fair     LABS AND IMAGING:     INR 1.7  Creatinine 6.4  Phosphorus 5.3    ASSESSMENT & PLAN:   Calciphylaxis:  Chronic wound to left medial thigh; acute on chronic pain is concerning for secondary bacterial infection  Admit to Med-tele  Consult wound care  Continue IV Zosyn/Vanc  Blood and wound cultures pending  Prn analgesics  Refused to have ultrasound of legs  AM labs to include phosphorus level    ESRD on HD:  Consult Dr. Ingram for HD MWF  No respiratory distress at this time  CXR with mild pulmonary edema    Anemia:  Chronic; H/H stable at baseline  No signs of acute bleeding at this time; denies black or bloody stools  Follow h/h    Persistent A-fib:  Controlled rate at this time  Continue metoprolol and coumadin (INR 1.5 today)  Daily PT/INR    DVT Prophylaxis: Continue coumadin for DVT prophylaxis and will be advised to be as mobile as possible and sit in a chair as tolerated.     Anemia   Calciphylaxis   both legs   CHF (congestive heart failure)   Encounter for blood transfusion   Gout   Hypertension   Mitral valve regurgitation   Osteoarthritis   Pancreatitis   Peripheral vascular disease   Peritoneal dialysis catheter in place   Pneumonia   Renal failure     Plan update today:  Continue care.  Appreciate consultants.   Will give an additional dose of oral oxycodone x1  now  General surgery consultation for possible debridement Monday.  NPO after midnight Sunday  Hold Lovenox preoperatively.  Vitamin K 10 mg p.o. x1.  Repeat a.m. INR.  Continue local wound care  Continue hemodialysis per nephrology  Continue sodium thiosulfate with dialysis for calciphylaxis  Continued to hold empiric IV antibiotics.  I am not convinced that the patient's wound is infected.  No episodes of fever or purulent drainage.  Mobilize as able with PT/OT  Moderate-risk patient secondary to moderate exacerbation of chronic illness; prescription drug management  VTE prophylaxis with Lovenox; hold Coumadin due to possible surgery in near future     Yang Royal  [facility] Hospitalist  07/12/2020

## 2020-07-12 NOTE — NURSING
Pt noncompliant. She is eating seafood and fast food. Her son just brought her food. This is why she is requesting her sevelemar now.   Pt still refusing dressing change.

## 2020-07-12 NOTE — PLAN OF CARE
07/12/20 0830   Patient Assessment/Suction   Level of Consciousness (AVPU) alert   Respiratory Effort Normal;Unlabored   Expansion/Accessory Muscles/Retractions no use of accessory muscles;no retractions   All Lung Fields Breath Sounds diminished   PRE-TX-O2   O2 Device (Oxygen Therapy) nasal cannula   $ Is the patient on Low Flow Oxygen? Yes   Flow (L/min) 4   SpO2 100 %   Pulse Oximetry Type Intermittent   $ Pulse Oximetry - Multiple Charge Pulse Oximetry - Multiple   Pulse 74   Resp 18   Aerosol Therapy   $ Aerosol Therapy Charges PRN treatment not required   Respiratory Evaluation   $ Care Plan Tech Time 15 min   Evaluation For   (care plan)

## 2020-07-12 NOTE — PLAN OF CARE
07/11/20 1900   Patient Assessment/Suction   Level of Consciousness (AVPU) alert   Respiratory Effort Normal;Unlabored   Expansion/Accessory Muscles/Retractions no use of accessory muscles   PRE-TX-O2   O2 Device (Oxygen Therapy) nasal cannula   Flow (L/min) 3   SpO2 98 %   Pulse Oximetry Type Intermittent   $ Pulse Oximetry - Multiple Charge Pulse Oximetry - Multiple   Pulse 78   Resp 18   Aerosol Therapy   $ Aerosol Therapy Charges PRN treatment not required   Respiratory Evaluation   $ Care Plan Tech Time 15 min

## 2020-07-13 ENCOUNTER — ANESTHESIA EVENT (OUTPATIENT)
Dept: SURGERY | Facility: HOSPITAL | Age: 75
DRG: 622 | End: 2020-07-13
Payer: MEDICARE

## 2020-07-13 ENCOUNTER — DOCUMENT SCAN (OUTPATIENT)
Dept: HOME HEALTH SERVICES | Facility: HOSPITAL | Age: 75
End: 2020-07-13

## 2020-07-13 ENCOUNTER — ANESTHESIA (OUTPATIENT)
Dept: SURGERY | Facility: HOSPITAL | Age: 75
DRG: 622 | End: 2020-07-13
Payer: MEDICARE

## 2020-07-13 PROBLEM — E83.59: Status: ACTIVE | Noted: 2020-07-13

## 2020-07-13 PROBLEM — L97.922: Status: ACTIVE | Noted: 2020-07-13

## 2020-07-13 LAB
ALBUMIN SERPL BCP-MCNC: 2.5 G/DL (ref 3.5–5.2)
ANION GAP SERPL CALC-SCNC: 14 MMOL/L (ref 8–16)
BUN SERPL-MCNC: 41 MG/DL (ref 8–23)
CALCIUM SERPL-MCNC: 8.4 MG/DL (ref 8.7–10.5)
CHLORIDE SERPL-SCNC: 97 MMOL/L (ref 95–110)
CO2 SERPL-SCNC: 23 MMOL/L (ref 23–29)
CREAT SERPL-MCNC: 7.7 MG/DL (ref 0.5–1.4)
EST. GFR  (AFRICAN AMERICAN): 5.5 ML/MIN/1.73 M^2
EST. GFR  (NON AFRICAN AMERICAN): 4.7 ML/MIN/1.73 M^2
GLUCOSE SERPL-MCNC: 55 MG/DL (ref 70–110)
INR PPP: 1.5
PHOSPHATE SERPL-MCNC: 6.4 MG/DL (ref 2.7–4.5)
POTASSIUM SERPL-SCNC: 5.4 MMOL/L (ref 3.5–5.1)
PROTHROMBIN TIME: 17.2 SEC (ref 10.6–14.8)
SODIUM SERPL-SCNC: 134 MMOL/L (ref 136–145)

## 2020-07-13 PROCEDURE — 11042 PR DEBRIDEMENT, SKIN, SUB-Q TISSUE,=<20 SQ CM: ICD-10-PCS | Mod: ,,, | Performed by: SURGERY

## 2020-07-13 PROCEDURE — 11045 DBRDMT SUBQ TISS EACH ADDL: CPT | Mod: ,,, | Performed by: SURGERY

## 2020-07-13 PROCEDURE — 63600175 PHARM REV CODE 636 W HCPCS: Performed by: ANESTHESIOLOGY

## 2020-07-13 PROCEDURE — 80069 RENAL FUNCTION PANEL: CPT

## 2020-07-13 PROCEDURE — 11045 PR DEB SUBQ TISSUE ADD-ON: ICD-10-PCS | Mod: ,,, | Performed by: SURGERY

## 2020-07-13 PROCEDURE — 25000003 PHARM REV CODE 250: Performed by: INTERNAL MEDICINE

## 2020-07-13 PROCEDURE — 87340 HEPATITIS B SURFACE AG IA: CPT

## 2020-07-13 PROCEDURE — 90935 HEMODIALYSIS ONE EVALUATION: CPT

## 2020-07-13 PROCEDURE — 25000003 PHARM REV CODE 250: Performed by: NURSE PRACTITIONER

## 2020-07-13 PROCEDURE — 88304 TISSUE EXAM BY PATHOLOGIST: CPT | Mod: TC

## 2020-07-13 PROCEDURE — 37000008 HC ANESTHESIA 1ST 15 MINUTES: Performed by: SURGERY

## 2020-07-13 PROCEDURE — 63600175 PHARM REV CODE 636 W HCPCS: Performed by: NURSE ANESTHETIST, CERTIFIED REGISTERED

## 2020-07-13 PROCEDURE — 94761 N-INVAS EAR/PLS OXIMETRY MLT: CPT

## 2020-07-13 PROCEDURE — 36000706: Performed by: SURGERY

## 2020-07-13 PROCEDURE — 27000675 HC TUBING MICRODRIP: Performed by: ANESTHESIOLOGY

## 2020-07-13 PROCEDURE — 27000221 HC OXYGEN, UP TO 24 HOURS

## 2020-07-13 PROCEDURE — 71000039 HC RECOVERY, EACH ADD'L HOUR: Performed by: SURGERY

## 2020-07-13 PROCEDURE — 37000009 HC ANESTHESIA EA ADD 15 MINS: Performed by: SURGERY

## 2020-07-13 PROCEDURE — 27000080 OPTIME MED/SURG SUP & DEVICES GENERAL CLASSIFICATION: Performed by: SURGERY

## 2020-07-13 PROCEDURE — 99900035 HC TECH TIME PER 15 MIN (STAT)

## 2020-07-13 PROCEDURE — 36415 COLL VENOUS BLD VENIPUNCTURE: CPT

## 2020-07-13 PROCEDURE — 27201423 OPTIME MED/SURG SUP & DEVICES STERILE SUPPLY: Performed by: SURGERY

## 2020-07-13 PROCEDURE — 27202107 HC XP QUATRO SENSOR: Performed by: ANESTHESIOLOGY

## 2020-07-13 PROCEDURE — 27000654 HC CATH IV JELCO: Performed by: ANESTHESIOLOGY

## 2020-07-13 PROCEDURE — 36000707: Performed by: SURGERY

## 2020-07-13 PROCEDURE — 27000671 HC TUBING MICROBORE EXT: Performed by: ANESTHESIOLOGY

## 2020-07-13 PROCEDURE — 71000033 HC RECOVERY, INTIAL HOUR: Performed by: SURGERY

## 2020-07-13 PROCEDURE — 12000002 HC ACUTE/MED SURGE SEMI-PRIVATE ROOM

## 2020-07-13 PROCEDURE — 25000003 PHARM REV CODE 250: Performed by: NURSE ANESTHETIST, CERTIFIED REGISTERED

## 2020-07-13 PROCEDURE — 11042 DBRDMT SUBQ TIS 1ST 20SQCM/<: CPT | Mod: ,,, | Performed by: SURGERY

## 2020-07-13 PROCEDURE — 25000003 PHARM REV CODE 250: Performed by: ANESTHESIOLOGY

## 2020-07-13 PROCEDURE — 85610 PROTHROMBIN TIME: CPT

## 2020-07-13 PROCEDURE — 63600175 PHARM REV CODE 636 W HCPCS: Mod: TB | Performed by: INTERNAL MEDICINE

## 2020-07-13 RX ORDER — FENTANYL CITRATE 50 UG/ML
25 INJECTION, SOLUTION INTRAMUSCULAR; INTRAVENOUS
Status: COMPLETED | OUTPATIENT
Start: 2020-07-13 | End: 2020-07-13

## 2020-07-13 RX ORDER — PROPOFOL 10 MG/ML
VIAL (ML) INTRAVENOUS
Status: DISCONTINUED | OUTPATIENT
Start: 2020-07-13 | End: 2020-07-13

## 2020-07-13 RX ORDER — PROMETHAZINE HYDROCHLORIDE 25 MG/ML
INJECTION, SOLUTION INTRAMUSCULAR; INTRAVENOUS
Status: DISCONTINUED | OUTPATIENT
Start: 2020-07-13 | End: 2020-07-13

## 2020-07-13 RX ORDER — DEXAMETHASONE SODIUM PHOSPHATE 4 MG/ML
INJECTION, SOLUTION INTRA-ARTICULAR; INTRALESIONAL; INTRAMUSCULAR; INTRAVENOUS; SOFT TISSUE
Status: DISCONTINUED | OUTPATIENT
Start: 2020-07-13 | End: 2020-07-13

## 2020-07-13 RX ORDER — SODIUM THIOSULFATE 250 MG/ML
12.5 INJECTION, SOLUTION INTRAVENOUS ONCE
Status: COMPLETED | OUTPATIENT
Start: 2020-07-13 | End: 2020-07-13

## 2020-07-13 RX ORDER — LIDOCAINE HYDROCHLORIDE 20 MG/ML
JELLY TOPICAL
Status: DISCONTINUED | OUTPATIENT
Start: 2020-07-13 | End: 2020-07-13

## 2020-07-13 RX ORDER — SUCCINYLCHOLINE CHLORIDE 20 MG/ML
INJECTION INTRAMUSCULAR; INTRAVENOUS
Status: DISCONTINUED | OUTPATIENT
Start: 2020-07-13 | End: 2020-07-13

## 2020-07-13 RX ORDER — ONDANSETRON 2 MG/ML
4 INJECTION INTRAMUSCULAR; INTRAVENOUS DAILY PRN
Status: DISCONTINUED | OUTPATIENT
Start: 2020-07-13 | End: 2020-07-14

## 2020-07-13 RX ORDER — DIPHENHYDRAMINE HYDROCHLORIDE 50 MG/ML
12.5 INJECTION INTRAMUSCULAR; INTRAVENOUS
Status: DISCONTINUED | OUTPATIENT
Start: 2020-07-13 | End: 2020-07-14

## 2020-07-13 RX ORDER — CEFAZOLIN SODIUM 1 G/3ML
INJECTION, POWDER, FOR SOLUTION INTRAMUSCULAR; INTRAVENOUS
Status: DISCONTINUED | OUTPATIENT
Start: 2020-07-13 | End: 2020-07-13

## 2020-07-13 RX ORDER — OXYCODONE HYDROCHLORIDE 5 MG/1
5 TABLET ORAL
Status: DISCONTINUED | OUTPATIENT
Start: 2020-07-13 | End: 2020-07-14

## 2020-07-13 RX ORDER — SODIUM CHLORIDE 0.9 % (FLUSH) 0.9 %
10 SYRINGE (ML) INJECTION
Status: DISCONTINUED | OUTPATIENT
Start: 2020-07-13 | End: 2020-07-15 | Stop reason: HOSPADM

## 2020-07-13 RX ADMIN — PROPOFOL 100 MG: 10 INJECTION, EMULSION INTRAVENOUS at 09:07

## 2020-07-13 RX ADMIN — LIDOCAINE HYDROCHLORIDE 5 ML: 20 JELLY TOPICAL at 09:07

## 2020-07-13 RX ADMIN — SUCCINYLCHOLINE CHLORIDE 160 MG: 20 INJECTION, SOLUTION INTRAMUSCULAR; INTRAVENOUS at 09:07

## 2020-07-13 RX ADMIN — DEXAMETHASONE SODIUM PHOSPHATE 8 MG: 4 INJECTION, SOLUTION INTRAMUSCULAR; INTRAVENOUS at 09:07

## 2020-07-13 RX ADMIN — FENTANYL CITRATE 100 MCG: 50 INJECTION INTRAMUSCULAR; INTRAVENOUS at 09:07

## 2020-07-13 RX ADMIN — EPOETIN ALFA-EPBX 2900 UNITS: 10000 INJECTION, SOLUTION INTRAVENOUS; SUBCUTANEOUS at 12:07

## 2020-07-13 RX ADMIN — CEFAZOLIN 2 G: 330 INJECTION, POWDER, FOR SOLUTION INTRAMUSCULAR; INTRAVENOUS at 09:07

## 2020-07-13 RX ADMIN — ONDANSETRON 4 MG: 2 INJECTION INTRAMUSCULAR; INTRAVENOUS at 09:07

## 2020-07-13 RX ADMIN — PROMETHAZINE HYDROCHLORIDE 6.25 MG: 25 INJECTION INTRAMUSCULAR; INTRAVENOUS at 09:07

## 2020-07-13 RX ADMIN — PANTOPRAZOLE SODIUM 40 MG: 40 TABLET, DELAYED RELEASE ORAL at 05:07

## 2020-07-13 RX ADMIN — OXYCODONE HYDROCHLORIDE 5 MG: 5 TABLET ORAL at 10:07

## 2020-07-13 RX ADMIN — FENTANYL CITRATE 25 MCG: 50 INJECTION INTRAMUSCULAR; INTRAVENOUS at 10:07

## 2020-07-13 RX ADMIN — OXYCODONE AND ACETAMINOPHEN 1 TABLET: 5; 325 TABLET ORAL at 05:07

## 2020-07-13 RX ADMIN — SODIUM THIOSULFATE 12.5 G: 250 INJECTION, SOLUTION INTRAVENOUS at 02:07

## 2020-07-13 RX ADMIN — SODIUM CHLORIDE: 0.9 INJECTION, SOLUTION INTRAVENOUS at 09:07

## 2020-07-13 RX ADMIN — METOPROLOL TARTRATE 25 MG: 25 TABLET, FILM COATED ORAL at 08:07

## 2020-07-13 NOTE — ANESTHESIA PROCEDURE NOTES
Intubation  Performed by: Geoff Brown CRNA  Authorized by: Ian Patricia MD     Intubation:     Induction:  Intravenous    Intubated:  Postinduction    Mask Ventilation:  N/a    Attempts:  1    Attempted By:  CRNA    Method of Intubation:  Direct    Blade:  Patricia 2    Laryngeal View Grade: Grade I - full view of chords      Difficult Airway Encountered?: No      Complications:  None    Airway Device:  Oral endotracheal tube    Airway Device Size:  7.5    Style/Cuff Inflation:  Cuffed    Tube secured:  22    Secured at:  The lips    Placement Verified By:  Capnometry    Complicating Factors:  None    Findings Post-Intubation:  BS equal bilateral and atraumatic/condition of teeth unchanged

## 2020-07-13 NOTE — BRIEF OP NOTE
[facility]  Brief Operative Note    SUMMARY     Surgery Date: 7/13/2020     Surgeon(s) and Role:     * Carlos Elam III, MD - Primary    Assisting Surgeon: None    Pre-op Diagnosis:  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 [E83.59, L97.922]    Post-op Diagnosis:  Post-Op Diagnosis Codes:     * 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 [E83.59, L97.922]    Procedure  1. excisional debridement of ulcerated skin and subcutaneous tissue at the borders of the previous wound  2.  Placement of wound VAC dressing over open wound of left leg    Anesthesia: General    Description of Procedure:  Patient brought to the operating room and transferred to the operating room table supine position.  She was given general anesthesia and intubated.  The left leg was prepped and draped.  The ulcerated wound had some irregular borders.  The entire wound was indurated and firm.  I made an incision at the borders of the wound circumferentially.  Dissection was carried down into the subcutaneous tissue where the tissue started to feel softer.  The entire wound was resected and included skin and subcutaneous tissue.  Size was 12 x 6 cm.  Hemostasis was obtained.  I placed a negative pressure wound VAC dressing over the open wound.  There was a good seal when hooked to suction.  Patient was extubated and brought to recovery room stable condition.  Description of the findings of the procedure: large ulcerated wound excised at the borders and into the underlying subcutaneous tissue. Wound vac placed over wound     Estimated Blood Loss: 20mL    Complications none    Instrument counts correct        Specimens:   Specimen (12h ago, onward)     Start     Ordered    07/13/20 0949  Specimen to Pathology - Surgery  Once     Comments: Pre-op Diagnosis: 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 [E83.59,  L97.922]Procedure(s):DEBRIDEMENT, WOUND Number of specimens: 1Name of specimens: chronic calciphylaxis wound left thigh      07/13/20 0949

## 2020-07-13 NOTE — PROGRESS NOTES
"Progress Note  [facility]    Admit Date: 7/7/2020   LOS: 5 days     SUBJECTIVE:     Follow-up For:  ESRD    Seen while on dialysis  S/p I&D left thigh wound now with wound vac  Reports "I still have pain", unrealistic expectation for no pain with calciphylaxis, counseled disease, treatment, prevention  No c/o otherwise  Appetite fair    Review of Systems:  GENERAL: Pain thigh wd with wd vac; No fever, chills, night sweats, decreased intake, weight loss.  HEENT: No blurry vision, decreased vision, floaters, hearing defects, sore throat.  CV:  No CP, ARGUELLO, orthopnea, PND, palpitations.  PULM:  No cough, hemoptysis, wheezing.  GI:  No nausea, vomiting, diarrhea, BRBPR, melena, abdominal pain.  :  No dysuria, frequency, urgency, hematuria.  NEURO: No LOC, seizure activity, dizziness.  SKIN:  No rash, pruritis, petechiae  MS:  No arthralgia, muscle aches, arthritis  PSYCH: No depression, anxiety  ENDO:  No polydipsia, polyuria, heat or cold intolerance  Dialysis fistula right arm in use    OBJECTIVE:     Vital Signs (Most Recent)  Temp: 97.6 °F (36.4 °C) (07/13/20 0730)  Pulse: (!) 56 (07/13/20 0900)  Resp: 17 (07/13/20 1024)  BP: 123/62 (07/13/20 0900)  SpO2: 95 % (07/13/20 0754)    Vital Signs Range (Last 24H):  Temp:  [97.5 °F (36.4 °C)-97.8 °F (36.6 °C)]   Pulse:  [52-67]   Resp:  [15-20]   BP: (115-136)/(58-68)   SpO2:  [95 %-99 %]     No intake/output data recorded.    Physical Exam:   General: Awake and alert. Dialysis in progress  HEENT: No scleral icterus, MM moist.  NECK: No JVD. No carotid bruits.  CV: Iregular rate and rhthym with murmur  PULM: Clear without crackles, rhonchi, wheezes.  ABD: Soft, nl BS, NT, ND.  EXTR: No edema, clubbing, cyanosis.   NEURO: Non focal and grossly intact.  SKIN: Wd left thigh wd vac in place sanguinous drainage, uninterrupted   MS: No joint effusions or erythema.   PSYCH: Normal Mood.  Right arm fistula positive thrill and bruit in " use      Laboratory:  Recent Labs   Lab 07/11/20  0517   HGB 8.9*   HCT 30.2*   WBC 5.94          Recent Labs   Lab 07/11/20  0517 07/12/20  0610 07/13/20  0501   * 134* 134*   K 4.3 5.0 5.4*   CL 97 95 97   CO2 26 25 23   BUN 19 29* 41*   CREATININE 4.8* 6.4* 7.7*   GLU 58* 56* 55*   CALCIUM 8.4* 8.7 8.4*   PHOS 4.4 5.3* 6.4*       Lab Results   Component Value Date    PTH 75.6 07/09/2020    CALCIUM 8.4 (L) 07/13/2020    CAION 0.84 (L) 07/26/2017    PHOS 6.4 (H) 07/13/2020       Lab Results   Component Value Date    URICACID 4.0 03/17/2020       BNP  No results for input(s): BNP in the last 168 hours.    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       Nephrology Assessment/Plan:    ESRD  -Hyponatremia and hyperkalemia, HD now  -continue MWF schedule  -Renal dose all meds  -Low Phos/potassium diet     Calciphylaxis:  -phosphorus controlled with binders, continue as ordered with meals  -sodium thiosulfate with dialysis  -aggressive wound care  -Education on disease process, prevention, complications     Left thigh wound:  -s/p I&D with wd vac  -I have rediscussed the need for long-term wound care and LTAC  -Continue sodium thiosulfate post dialysis which she has been receiving outpatient     Hyperkalemia:  -using a low K dialysate  -monitor serum potassium     Anemia:  -DONNY's with dialysis  -no symptoms of bleeding  -no thrombocytopenia     Secondary hyperparathyroidism:  -hypocalcemia corrects with hypoalbuminemia  -above goal range 3-6, binders with meals   -Diet education and compliance addressed  -monitor calcium and phosphorus; last PTH  okay     Hypertension:  -volume good  -variable control but acceptable  -control pain  -continue current meds       RL Chung/Joseph Ingram M.D.

## 2020-07-13 NOTE — OP NOTE
Surgery Date: 7/13/2020     Surgeon(s) and Role:     * Carlos Elam III, MD - Primary    Assisting Surgeon: None    Pre-op Diagnosis:  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 [E83.59, L97.922]    Post-op Diagnosis:  Post-Op Diagnosis Codes:     * 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 [E83.59, L97.922]    Procedure  1. excisional debridement of ulcerated skin and subcutaneous tissue at the borders of the previous wound  2.  Placement of wound VAC dressing over open wound of left leg    Anesthesia: General    Description of Procedure:  Patient brought to the operating room and transferred to the operating room table supine position.  She was given general anesthesia and intubated.  The left leg was prepped and draped.  The ulcerated wound had some irregular borders.  The entire wound was indurated and firm.  I made an incision at the borders of the wound circumferentially.  Dissection was carried down into the subcutaneous tissue where the tissue started to feel softer.  The entire wound was resected and included skin and subcutaneous tissue.  Size was 12 x 6 cm.  Hemostasis was obtained.  I placed a negative pressure wound VAC dressing over the open wound.  There was a good seal when hooked to suction.  Patient was extubated and brought to recovery room stable condition.  Description of the findings of the procedure: large ulcerated wound excised at the borders and into the underlying subcutaneous tissue. Wound vac placed over wound     Estimated Blood Loss: 20mL    Complications none    Instrument counts correct        Specimens:   Specimen (12h ago, onward)     Start     Ordered    07/13/20 0949  Specimen to Pathology - Surgery  Once     Comments: Pre-op Diagnosis: 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 [E83.59, L97.922]Procedure(s):DEBRIDEMENT, WOUND Number of specimens: 1Name  of specimens: chronic calciphylaxis wound left thigh      07/13/20 0949

## 2020-07-13 NOTE — ANESTHESIA POSTPROCEDURE EVALUATION
Anesthesia Post Evaluation    Patient: Griselda Neely    Procedure(s) Performed: Procedure(s) (LRB):  DEBRIDEMENT, WOUND (Left)    Final Anesthesia Type: general    Patient location during evaluation: PACU  Patient participation: Yes- Able to Participate  Level of consciousness: awake and alert, oriented and awake  Post-procedure vital signs: reviewed and stable  Pain management: adequate  Airway patency: patent    PONV status at discharge: No PONV  Anesthetic complications: no      Cardiovascular status: blood pressure returned to baseline, hemodynamically stable and stable  Respiratory status: unassisted, spontaneous ventilation and nasal cannula  Hydration status: euvolemic  Follow-up not needed.          Vitals Value Taken Time   /72 07/13/20 1100   Temp 36.4 °C (97.6 °F) 07/13/20 1100   Pulse 72 07/13/20 1111   Resp 26 07/13/20 1111   SpO2 92 % 07/13/20 1111   Vitals shown include unvalidated device data.      No case tracking events are documented in the log.      Pain/Skip Score: Pain Rating Prior to Med Admin: 10 (7/13/2020 10:36 AM)  Pain Rating Post Med Admin: 1 (7/12/2020  3:00 PM)  Skip Score: 10 (7/13/2020 11:00 AM)

## 2020-07-13 NOTE — PLAN OF CARE
07/13/20 0754   Patient Assessment/Suction   Level of Consciousness (AVPU) alert   All Lung Fields Breath Sounds clear;diminished   PRE-TX-O2   O2 Device (Oxygen Therapy) room air   SpO2 95 %   Pulse Oximetry Type Intermittent   $ Pulse Oximetry - Multiple Charge Pulse Oximetry - Multiple   Pulse (!) 56   Resp 15   Aerosol Therapy   $ Aerosol Therapy Charges PRN treatment not required   Respiratory Evaluation   $ Care Plan Tech Time 15 min

## 2020-07-13 NOTE — ANESTHESIA PREPROCEDURE EVALUATION
07/13/2020  Griselda Neely is a 74 y.o., female.  Patient Active Problem List   Diagnosis    HTN (hypertension)    PND (paroxysmal nocturnal dyspnea)    Tobacco dependence    ESRD (end stage renal disease) on HD M,W, F    Hyperparathyroidism    Other persistent atrial fibrillation    Anemia due to chronic kidney disease    Pulmonary hypertension    Non-rheumatic mitral regurgitation    DNR (do not resuscitate)    Chronic respiratory failure    Coronary arteriosclerosis in native artery    Gastroesophageal reflux disease    S/P parathyroidectomy    Tension-type headache    Hyperparathyroidism due to renal insufficiency    Functional dyspepsia    HLD (hyperlipidemia)    PVD (peripheral vascular disease)    Left atrial thrombus    Encephalopathy    Chronic systolic heart failure    Peripheral vascular disease now with left lower extremity occlusion    H/O mitral valve replacement    Gout    Anemia    Chronic pain on chronic narcotics    Left leg pain    Atrial fibrillation with RVR    Acute on chronic respiratory failure    Volume overload    Calciphylaxis    Encephalopathy chronic    Wound infection    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       Past Surgical History:   Procedure Laterality Date    ACHILLES TENDON SURGERY Right     ANGIOGRAPHY OF ARTERIOVENOUS SHUNT Right 1/7/2020    Procedure: Fistulogram with Possible Intervention;  Surgeon: Joseph Loyola MD;  Location: [facility] CATH/EP LAB;  Service: Cardiology;  Laterality: Right;    ANGIOGRAPHY OF LOWER EXTREMITY Left 3/18/2020    Procedure: Angiogram Extremity Unilateral;  Surgeon: Ali Khoobehi, MD;  Location: [facility] CATH/EP LAB;  Service: Cardiology;  Laterality: Left;    APPENDECTOMY      CARDIAC CATHETERIZATION  07/03/2017    CHOLECYSTECTOMY      heal surgery Right      HYSTERECTOMY      INSERTION OF STENT INTO PERIPHERAL VESSEL N/A 1/7/2020    Procedure: INSERTION, STENT, VESSEL, PERIPHERAL;  Surgeon: Joseph Loyola MD;  Location: [facility] CATH/EP LAB;  Service: Cardiology;  Laterality: N/A;    PARATHYROIDECTOMY      PARATHYROIDECTOMY  07/13/2017    PERCUTANEOUS TRANSLUMINAL ANGIOPLASTY OF ARTERIOVENOUS FISTULA N/A 1/7/2020    Procedure: PTA, AV FISTULA;  Surgeon: Joseph Loyola MD;  Location: [facility] CATH/EP LAB;  Service: Cardiology;  Laterality: N/A;    PERITONEAL CATHETER INSERTION      RENAL BIOPSY      vocal cord nodule          Tobacco Use:  The patient  reports that she has been smoking cigarettes. She has a 22.50 pack-year smoking history. She has never used smokeless tobacco.     Results for orders placed or performed during the hospital encounter of 07/07/20   EKG 12-lead    Collection Time: 07/07/20 10:42 AM    Narrative    Test Reason : N18.9,    Vent. Rate : 088 BPM     Atrial Rate : 115 BPM     P-R Int : 000 ms          QRS Dur : 100 ms      QT Int : 418 ms       P-R-T Axes : 000 -29 038 degrees     QTc Int : 505 ms    Atrial fibrillation  Prolonged QT  Abnormal ECG  When compared with ECG of 14-JUN-2020 14:49,  Nonspecific T wave abnormality has replaced inverted T waves in Lateral  leads  QT has lengthened  Confirmed by Jerrod Asif MD ([id number]) on 7/8/2020 10:49:46 PM    Referred By: [email or web address]   SELF           Confirmed By:Jerrod Asif MD             Lab Results   Component Value Date    WBC 5.94 07/11/2020    HGB 8.9 (L) 07/11/2020    HCT 30.2 (L) 07/11/2020    MCV 94 07/11/2020     07/11/2020     BMP  Lab Results   Component Value Date     (L) 07/13/2020    K 5.4 (H) 07/13/2020    CL 97 07/13/2020    CO2 23 07/13/2020    BUN 41 (H) 07/13/2020    CREATININE 7.7 (H) 07/13/2020    CALCIUM 8.4 (L) 07/13/2020    ANIONGAP 14 07/13/2020    ESTGFRAFRICA 5.5 (A) 07/13/2020    EGFRNONAA 4.7 (A) 07/13/2020       Echocardiography Findings     Left  Ventricle Severe decreased ejection fraction at 25%. Eccentric hypertrophy observed. Grade IV (severe) left ventricular diastolic dysfunction. Elevated left atrial pressure.   Right Ventricle The right ventricle is moderately dilated. The systolic function is moderately reduced.   Left Atrium There is severe left atrial enlargement. Layered thrombus suggested. The thrombus is fixed and located in the inferior cavity.   Right Atrium There is moderate right atrial enlargement.   Aortic Valve Aortic valve sclerosis is moderate. The LVOT diameter is 2.09 cm.   Mitral Valve Trace regurgitation. There is a bioprosthetic mitral valve. The prosthetic valve is normal.   Tricuspid Valve Moderate regurgitation. Mild to moderate pulmonary hypertension present.   Pulmonic Valve Mild to moderate regurgitation.   Pericardium Small posterior pericardial effusion. No cardiac tamponade present.       Pre-op Assessment    I have reviewed the Patient Summary Reports.     I have reviewed the Nursing Notes.    I have reviewed the Medications.     Review of Systems  Anesthesia Hx:  No problems with previous Anesthesia  History of prior surgery of interest to airway management or planning: Previous anesthesia: General Denies Family Hx of Anesthesia complications.   Denies Personal Hx of Anesthesia complications.   Social:  Smoker, No Alcohol Use    Hematology/Oncology:         -- Anemia: Hematology Comments: Anemia secondary to CKD   Cardiovascular:   Hypertension, poorly controlled Valvular problems/Murmurs, MR CAD  Dysrhythmias atrial fibrillation CHF Orthopnea PND PVD hyperlipidemia ARGUELLO ECG has been reviewed. Hx. Lt. Atrial Thrombus   Diastolic Heart Failure   EF 25% Chronic CHF   Pulmonary HTN   Pulmonary:   Pneumonia COPD, severe Shortness of breath Acute on chronic respiratory failure with hypoxia   Rt. Pleural Effusion   Home O2 3L/min  BiPAP on admit   Renal/:   Chronic Renal Disease, Dialysis, CRI, ESRD Dialysis M,W,F  Last  dialysis Friday    Hepatic/GI:   GERD Melena this admit   Musculoskeletal:   Arthritis  Left Leg Wound   Neurological:   Headaches Seizures    Endocrine:  Endocrine Normal Hypoglycemia Denies Thyroid Disease  Parathyroid Disease, Hyperparathyroidism, s/p parathyroidectomy Hyperparathyroidism secondary to renal insufficiency          Physical Exam  General:  Malnutrition       Chest/Lungs:  Chest/Lungs Findings: (bibasilar crackles) Decreased Breath Sounds Bilateral     Heart/Vascular:  Heart Findings: Rate: Normal  Rhythm: Irregularly Irregular        Mental Status:  Mental Status Findings:  Cooperative, Alert and Oriented         Anesthesia Plan  Type of Anesthesia, risks & benefits discussed:  Anesthesia Type:  MAC  Patient's Preference: MAC  Intra-op Monitoring Plan: standard ASA monitors  Intra-op Monitoring Plan Comments:   Post Op Pain Control Plan: per primary service following discharge from PACU  Post Op Pain Control Plan Comments:   Induction:    Beta Blocker:  Patient is on a Beta-Blocker and has received one dose within the past 24 hours (No further documentation required).       Informed Consent:    ASA Score: 4  emergent   Day of Surgery Review of History & Physical:        Anesthesia Plan Notes: General LMA OK   No IV lidocaine secondary to Hx Seizures   Minimal IVF's         Ready For Surgery From Anesthesia Perspective.

## 2020-07-13 NOTE — NURSING
Patient returned to unit via bed, AAOx3 and wound vac in progress to left lower thigh. EDINSON with dressing to fistula dry and intact. + Thrill and Bruit. Refuse care and comfort measures. IV saline locks in place no redness noted. Report to on-coming nurse. NAD

## 2020-07-13 NOTE — PLAN OF CARE
07/12/20 1930   Patient Assessment/Suction   Level of Consciousness (AVPU) alert   Respiratory Effort Normal;Unlabored   Expansion/Accessory Muscles/Retractions no use of accessory muscles   PRE-TX-O2   O2 Device (Oxygen Therapy) nasal cannula   Flow (L/min) 4   Pulse Oximetry Type Intermittent   $ Pulse Oximetry - Multiple Charge Pulse Oximetry - Multiple   Pulse 60   Resp 18   Aerosol Therapy   $ Aerosol Therapy Charges PRN treatment not required   Respiratory Evaluation   $ Care Plan Tech Time 15 min

## 2020-07-13 NOTE — TRANSFER OF CARE
"Anesthesia Transfer of Care Note    Patient: Griselda Neely    Procedure(s) Performed: Procedure(s) (LRB):  DEBRIDEMENT, WOUND (Left)    Patient location: PACU    Anesthesia Type: general    Transport from OR: Transported from OR on room air with adequate spontaneous ventilation    Post pain: adequate analgesia    Post assessment: no apparent anesthetic complications    Post vital signs: stable    Level of consciousness: awake and alert    Nausea/Vomiting: no nausea/vomiting    Complications: none    Transfer of care protocol was followed      Last vitals:   Visit Vitals  /62   Pulse (!) 56   Temp 36.4 °C (97.6 °F)   Resp 15   Ht 5' 5" (1.651 m)   Wt 40.9 kg (90 lb 2.7 oz)   LMP  (LMP Unknown)   SpO2 95%   Breastfeeding No   BMI 15.00 kg/m²     "

## 2020-07-13 NOTE — PHYSICIAN QUERY
PT Name: Griselda Neely  MR #: [id number]     Documentation Clarification      CDS/: José Miguel Le               Contact information: [phone]    This form is a permanent document in the medical record.     Query Date: July 13, 2020    By submitting this query, we are merely seeking further clarification of documentation. Please utilize your independent clinical judgment when addressing the question(s) below.    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 excisional debridement of ulcerated skin and subcutaneous tissue at the borders of the previous wound  2.  Placement of wound VAC dressing over open wound of left leg    Description of Procedure:  Patient brought to the operating room and transferred to the operating room table supine position.  She was given general anesthesia and intubated.  The left leg was prepped and draped.  The ulcerated wound had some irregular borders.  The entire wound was indurated and firm.  I made an incision at the borders of the wound circumferentially.  Dissection was carried down into the subcutaneous tissue where the tissue started to feel softer.  The entire wound was resected and included skin and subcutaneous tissue.  Size was 12 x 6 cm.  Hemostasis was obtained.  I placed a negative pressure wound VAC dressing over the open wound.  There was a good seal when hooked to suction.  Patient was extubated and brought to recovery room stable condition.  Description of the findings of the procedure: large ulcerated wound excised at the borders and into the underlying    7/13/20 Op Note by Dr. Elam                                                                            Provider, please specify the tool you used to excisionally debride the ulcerated skin and subcutaneous tissue:    Provider Use:      15 blade and  Josefa

## 2020-07-13 NOTE — PT/OT/SLP PROGRESS
Physical Therapy      Patient Name:  Griselda Neely   MRN:  [id number]    Patient not seen today secondary to Unavailable ( Patient in surgery for debridement). Will follow-up 7/14/2020.    Rosy Viera PT

## 2020-07-14 PROBLEM — E44.0 MALNUTRITION OF MODERATE DEGREE: Chronic | Status: ACTIVE | Noted: 2020-07-14

## 2020-07-14 LAB
ALBUMIN SERPL BCP-MCNC: 2.6 G/DL (ref 3.5–5.2)
ANION GAP SERPL CALC-SCNC: 15 MMOL/L (ref 8–16)
BASOPHILS # BLD AUTO: 0 K/UL (ref 0–0.2)
BASOPHILS NFR BLD: 0 % (ref 0–1.9)
BUN SERPL-MCNC: 29 MG/DL (ref 8–23)
CALCIUM SERPL-MCNC: 8.2 MG/DL (ref 8.7–10.5)
CHLORIDE SERPL-SCNC: 96 MMOL/L (ref 95–110)
CO2 SERPL-SCNC: 23 MMOL/L (ref 23–29)
CREAT SERPL-MCNC: 5.7 MG/DL (ref 0.5–1.4)
DIFFERENTIAL METHOD: ABNORMAL
EOSINOPHIL # BLD AUTO: 0 K/UL (ref 0–0.5)
EOSINOPHIL NFR BLD: 0 % (ref 0–8)
ERYTHROCYTE [DISTWIDTH] IN BLOOD BY AUTOMATED COUNT: 18.4 % (ref 11.5–14.5)
EST. GFR  (AFRICAN AMERICAN): 7.9 ML/MIN/1.73 M^2
EST. GFR  (NON AFRICAN AMERICAN): 6.8 ML/MIN/1.73 M^2
GLUCOSE SERPL-MCNC: 141 MG/DL (ref 70–110)
HBV SURFACE AG SERPL QL IA: NEGATIVE
HCT VFR BLD AUTO: 28.6 % (ref 37–48.5)
HGB BLD-MCNC: 8.7 G/DL (ref 12–16)
IMM GRANULOCYTES # BLD AUTO: 0.02 K/UL (ref 0–0.04)
IMM GRANULOCYTES NFR BLD AUTO: 0.3 % (ref 0–0.5)
INR PPP: 1.2
LYMPHOCYTES # BLD AUTO: 0.5 K/UL (ref 1–4.8)
LYMPHOCYTES NFR BLD: 8.7 % (ref 18–48)
MCH RBC QN AUTO: 27.6 PG (ref 27–31)
MCHC RBC AUTO-ENTMCNC: 30.4 G/DL (ref 32–36)
MCV RBC AUTO: 91 FL (ref 82–98)
MONOCYTES # BLD AUTO: 0.3 K/UL (ref 0.3–1)
MONOCYTES NFR BLD: 4.9 % (ref 4–15)
NEUTROPHILS # BLD AUTO: 5.3 K/UL (ref 1.8–7.7)
NEUTROPHILS NFR BLD: 86.1 % (ref 38–73)
NRBC BLD-RTO: 0 /100 WBC
PHOSPHATE SERPL-MCNC: 6 MG/DL (ref 2.7–4.5)
PLATELET # BLD AUTO: 273 K/UL (ref 150–350)
PMV BLD AUTO: 10.8 FL (ref 9.2–12.9)
POTASSIUM SERPL-SCNC: 4.8 MMOL/L (ref 3.5–5.1)
PROTHROMBIN TIME: 14.9 SEC (ref 10.6–14.8)
RBC # BLD AUTO: 3.15 M/UL (ref 4–5.4)
SODIUM SERPL-SCNC: 134 MMOL/L (ref 136–145)
WBC # BLD AUTO: 6.1 K/UL (ref 3.9–12.7)

## 2020-07-14 PROCEDURE — 94761 N-INVAS EAR/PLS OXIMETRY MLT: CPT

## 2020-07-14 PROCEDURE — 97116 GAIT TRAINING THERAPY: CPT

## 2020-07-14 PROCEDURE — 27000221 HC OXYGEN, UP TO 24 HOURS

## 2020-07-14 PROCEDURE — 85610 PROTHROMBIN TIME: CPT

## 2020-07-14 PROCEDURE — 25000003 PHARM REV CODE 250: Performed by: SURGERY

## 2020-07-14 PROCEDURE — 36415 COLL VENOUS BLD VENIPUNCTURE: CPT

## 2020-07-14 PROCEDURE — 80069 RENAL FUNCTION PANEL: CPT

## 2020-07-14 PROCEDURE — 99900035 HC TECH TIME PER 15 MIN (STAT)

## 2020-07-14 PROCEDURE — 25000003 PHARM REV CODE 250: Performed by: INTERNAL MEDICINE

## 2020-07-14 PROCEDURE — 85025 COMPLETE CBC W/AUTO DIFF WBC: CPT

## 2020-07-14 PROCEDURE — 12000002 HC ACUTE/MED SURGE SEMI-PRIVATE ROOM

## 2020-07-14 RX ORDER — ONDANSETRON 4 MG/1
8 TABLET, ORALLY DISINTEGRATING ORAL EVERY 8 HOURS PRN
Status: DISCONTINUED | OUTPATIENT
Start: 2020-07-14 | End: 2020-07-15 | Stop reason: HOSPADM

## 2020-07-14 RX ORDER — MUPIROCIN 20 MG/G
1 OINTMENT TOPICAL 2 TIMES DAILY
Status: DISCONTINUED | OUTPATIENT
Start: 2020-07-14 | End: 2020-07-15 | Stop reason: HOSPADM

## 2020-07-14 RX ORDER — WARFARIN 1 MG/1
3 TABLET ORAL ONCE
Status: COMPLETED | OUTPATIENT
Start: 2020-07-14 | End: 2020-07-14

## 2020-07-14 RX ORDER — HYDROCODONE BITARTRATE AND ACETAMINOPHEN 5; 325 MG/1; MG/1
1 TABLET ORAL EVERY 4 HOURS PRN
Status: DISCONTINUED | OUTPATIENT
Start: 2020-07-14 | End: 2020-07-15 | Stop reason: HOSPADM

## 2020-07-14 RX ADMIN — LOSARTAN POTASSIUM 25 MG: 25 TABLET, FILM COATED ORAL at 09:07

## 2020-07-14 RX ADMIN — HYDROCODONE BITARTRATE AND ACETAMINOPHEN 1 TABLET: 5; 325 TABLET ORAL at 01:07

## 2020-07-14 RX ADMIN — DILTIAZEM HYDROCHLORIDE 180 MG: 180 CAPSULE, COATED, EXTENDED RELEASE ORAL at 09:07

## 2020-07-14 RX ADMIN — ZINC SULFATE 220 MG (50 MG) CAPSULE 220 MG: CAPSULE at 09:07

## 2020-07-14 RX ADMIN — SEVELAMER CARBONATE 1600 MG: 800 TABLET, FILM COATED ORAL at 05:07

## 2020-07-14 RX ADMIN — HYDROCODONE BITARTRATE AND ACETAMINOPHEN 1 TABLET: 5; 325 TABLET ORAL at 11:07

## 2020-07-14 RX ADMIN — SEVELAMER CARBONATE 1600 MG: 800 TABLET, FILM COATED ORAL at 09:07

## 2020-07-14 RX ADMIN — METOPROLOL TARTRATE 25 MG: 25 TABLET, FILM COATED ORAL at 08:07

## 2020-07-14 RX ADMIN — SEVELAMER CARBONATE 1600 MG: 800 TABLET, FILM COATED ORAL at 11:07

## 2020-07-14 RX ADMIN — PANTOPRAZOLE SODIUM 40 MG: 40 TABLET, DELAYED RELEASE ORAL at 05:07

## 2020-07-14 RX ADMIN — MAGNESIUM OXIDE 400 MG: 400 TABLET ORAL at 09:07

## 2020-07-14 RX ADMIN — ASPIRIN 81 MG: 81 TABLET, DELAYED RELEASE ORAL at 09:07

## 2020-07-14 RX ADMIN — METOPROLOL TARTRATE 25 MG: 25 TABLET, FILM COATED ORAL at 09:07

## 2020-07-14 RX ADMIN — HYDROCODONE BITARTRATE AND ACETAMINOPHEN 1 TABLET: 5; 325 TABLET ORAL at 09:07

## 2020-07-14 RX ADMIN — WARFARIN SODIUM 3 MG: 1 TABLET ORAL at 05:07

## 2020-07-14 RX ADMIN — ISOSORBIDE MONONITRATE 30 MG: 30 TABLET, EXTENDED RELEASE ORAL at 09:07

## 2020-07-14 NOTE — HOSPITAL COURSE
I, Dr Portillo assumed care of this patient on 7/14/20. 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  Known history of significant peripheral vascular disease with significant vascular calcification.  Patient on sodium thiosulfate with dialysis.  Chronic pain due to calciphylaxis.  She was admitted and empirically started on broad-spectrum antibiotics however lower suspicion of infection and subsequently antibiotics discontinued.  General surgery was consulted for further wound care management including debridement.  Coumadin was held.  On 07/13 underwent debridement with wound VAC placement by Dr. Elam.  Nephrology following for dialysis in the setting of ESRD.  Patient adamantly declining LTAC placement, son visiting from out of town, patient requesting discharge home with home health.  On 07/15 seen by surgery, wound VAC in place, recommendations to be discharged with wound VAC in place, needs to be changed twice weekly.  Patient and son were counseled about discharge plan including medication changes, Coumadin restarted, need for repeat INR, short course of narcotics were prescribed, Norco and tramadol-patient on this medication chronically, advice to follow-up with providers for refills, follow-up as well as return precautions.  All questions and concerns addressed.  Discussed with wound care on day of discharge.  Communicated with general surgeon on day of discharge.    Discharge examination  Chronically ill female patient sitting in bed no apparent distress, cooperative  On supplemental oxygen via nasal cannula  Abdomen soft and nontender  Alert and oriented x3  Wound VAC to the left medial thigh in place

## 2020-07-14 NOTE — PROGRESS NOTES
[facility]  Progress Note  Patient Name: Griselda Neely  MRN: [id number]  Patient Class: IP- Inpatient   Admission Date: 7/7/2020 10:03 AM   Attending Physician:  Yang Royal MD  Primary Care Provider: Kalie Neely MD  Face-to-Face encounter date: 07/13/2020    HISTORY OF PRESENT ILLNESS:   Griselda Neely is a 74 y.o. Black or  female who  has a past medical history of Anemia, Calciphylaxis (07/2017), CHF (congestive heart failure), Encounter for blood transfusion (03/2016), Gout, Hypertension, Mitral valve regurgitation, Osteoarthritis, Pancreatitis, Peripheral vascular disease, Peritoneal dialysis catheter in place, Pneumonia (09/09/2017), and Renal failure.. The patient presented to [facility] on 7/7/2020 with a primary complaint of   History was obtained from the patient and the family present at the bedside and ER physician Sign-out. Patient is referred to the ED by wound care for wound to left medial thigh. The patient reports she has been treated outpatient with antibiotics and she has been having worsening pain to her left leg. She was seen by wound care today and was instructed to come to the ED for further evaluation. She has a history of noncompliance and refuses to have U/S of legs in the ED.  The patient has a history of ESRD on HD. She had her last HD on yesterday. She denies fever, chills, chest pain, sob, vomiting, abdominal pain, diarrhea, dizziness or LOC.   In the ED, VSS. CBC shows anemia that is chronic. H/H stable at baseline. She denies black or bloody stools. CMP was unremarkable.  EKG shows A-fib with controlled rate. No new signs of ischemia. CXR shows cardiomegaly and mild pulmonary edema with trace bilateral pleural effusions. Blood cultures were drawn and the patient was started on IV antibiotics.   Decision to admit was taken and patient was informed about the plan of care.     Interval history:   Today the  patient reports severe pain to postoperative site of calciphylaxis wound of left leg, constant timing, severe intensity, only partially controlled with pain medications. No fever, chest pain, or shortness of breath.  No abdominal pain, vomiting, or bleeding.  Mild nausea.  She is still drowsy from surgery.    PHYSICAL EXAM:     Vitals Reviewed  General appearance: Well-developed, no apparent distress, thin, nontoxic, generalized muscle wasting  Skin: Skin is dry and warm; left leg wound dressed  HENT: Atraumatic head. Moist mucous membranes of oral cavity.  Lungs: Clear to auscultation bilaterally.  Comfortable work of breathing  Heart: Irregularly irregular rhythm.  2+ radial pulses  Abdomen: Soft, non-distended, non-tender.   Psych:  Mood okay, Flat mood and affect.  Insight fair     LABS AND IMAGING:     INR 1.5  Potassium 5.4  Creatinine 7.7    ASSESSMENT & PLAN:     Assessment:  Calciphylaxis status post excisional debridement:  ESRD on HD  Mild hyperkalemia  Anemia of ESRD  Persistent A-fib on Coumadin  Chronic diastolic CHF  Gout  Hypertension and hypertensive heart disease with diastolic dysfunction  Mitral valve regurgitation  Osteoarthritis  Peripheral vascular disease  History of pancreatitis    Plan update today:  Continue care.  Appreciate consultants. Status post surgical excisional debridement of wound today in the OR  Held Lovenox preoperatively and administered Vitamin K 10 mg p.o. preoperatively due to persistent elevated INR.  Repeat a.m. INR.    Resume prophylactic dose Lovenox in postoperative period okay with surgery.  Resume Coumadin at discharge likely.  Continue local wound care  Continue hemodialysis per nephrology  Continue sodium thiosulfate with dialysis for calciphylaxis  Empiric IV vancomycin and Zosyn started and then stopped as there is no evidence of secondary bacterial infection  Continued to hold empiric IV antibiotics.  I am not convinced that the patient's wound is infected.  No  episodes of fever or purulent drainage.  Ultrasound of legs ordered however patient refused  Mobilize as able with PT/OT  Moderate-risk patient secondary to moderate exacerbation of chronic illness; prescription drug management  VTE prophylaxis with SCDs; Resume prophylactic dose Lovenox in postoperative period okay with surgery.  Resume Coumadin at discharge likely.  Disposition:  High risk for placement pending mobilization with PT/OT over the next couple days    Yang Royal  [facility] Hospitalist  07/13/2020

## 2020-07-14 NOTE — ASSESSMENT & PLAN NOTE
Severe calciphylaxis with chronic wounds, most prominent left inner thigh  Was seen from outpatient wound care with concern for infection, less likely antibiotics discontinued  Now status post debridement 7/13 OR by Dr. Elam with wound VAC applied  Continue local wound care, as per patient wound care will evaluate tomorrow to determine if wound VAC to continue or removed  Patient has declined LTAC placement, high risk for rehospitalization, only wishes to return home with home health  Continue p.r.n. p.o. pain control  Continue sodium thiosulfate with dialysis  Appreciate general surgery input

## 2020-07-14 NOTE — HPI
Griselda Neely is a 74 y.o. Black or  female who  has a past medical history of Anemia, Calciphylaxis (07/2017), CHF (congestive heart failure), Encounter for blood transfusion (03/2016), Gout, Hypertension, Mitral valve regurgitation, Osteoarthritis, Pancreatitis, Peripheral vascular disease, Peritoneal dialysis catheter in place, Pneumonia (09/09/2017), and Renal failure.. The patient presented to [facility] on 7/7/2020 with a primary complaint of Wound Infection (sent by wound care for wound on left leg)    History was obtained from the patient and the family present at the bedside and ER physician Sign-out. Patient is referred to the ED by wound care for wound to left medial thigh. The patient reports she has been treated outpatient with antibiotics and she has been having worsening pain to her left leg. She was seen by wound care today and was instructed to come to the ED for further evaluation. She has a history of noncompliance and refuses to have U/S of legs in the ED.  The patient has a history of ESRD on HD. She had her last HD on yesterday. She denies fever, chills, chest pain, sob, vomiting, abdominal pain, diarrhea, dizziness or LOC.      In the ED, VSS. CBC shows anemia that is chronic. H/H stable at baseline. She denies black or bloody stools. CMP was unremarkable.  EKG shows A-fib with controlled rate. No new signs of ischemia. CXR shows cardiomegaly and mild pulmonary edema with trace bilateral pleural effusions. Blood cultures were drawn and the patient was started on IV antibiotics.      Decision to admit was taken and patient was informed about the plan of care.

## 2020-07-14 NOTE — PROGRESS NOTES
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 Black or  female who  has a past medical history of Anemia, Calciphylaxis (07/2017), CHF (congestive heart failure), Encounter for blood transfusion (03/2016), Gout, Hypertension, Mitral valve regurgitation, Osteoarthritis, Pancreatitis, Peripheral vascular disease, Peritoneal dialysis catheter in place, Pneumonia (09/09/2017), and Renal failure.. The patient presented to [facility] on 7/7/2020 with a primary complaint of Wound Infection (sent by wound care for wound on left leg)    History was obtained from the patient and the family present at the bedside and ER physician Sign-out. Patient is referred to the ED by wound care for wound to left medial thigh. The patient reports she has been treated outpatient with antibiotics and she has been having worsening pain to her left leg. She was seen by wound care today and was instructed to come to the ED for further evaluation. She has a history of noncompliance and refuses to have U/S of legs in the ED.  The patient has a history of ESRD on HD. She had her last HD on yesterday. She denies fever, chills, chest pain, sob, vomiting, abdominal pain, diarrhea, dizziness or LOC.      In the ED, VSS. CBC shows anemia that is chronic. H/H stable at baseline. She denies black or bloody stools. CMP was unremarkable.  EKG shows A-fib with controlled rate. No new signs of ischemia. CXR shows cardiomegaly and mild pulmonary edema with trace bilateral pleural effusions. Blood cultures were drawn and the patient  was started on IV antibiotics.      Decision to admit was taken and patient was informed about the plan of care.     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  Known history of significant peripheral vascular disease.  Patient on sodium thiosulfate with dialysis.  Chronic pain.  She was admitted and empirically started on broad-spectrum antibiotics however lower suspicion of infection and subsequently antibiotics discontinued.  General surgery was consulted for further wound care management including debridement.  Coumadin was held.  On 07/13 underwent debridement with wound VAC placement by Dr. Elam.  Nephrology following for dialysis in the setting of ESRD.  Patient adamantly declining LTAC placement, son visiting from out of town, hopeful for discharge soon.    Interval History:  Patient continues to report moderate intensity left thigh wound, chronic, wound VAC in place.  States she is on 3-4 L home oxygen chronically.  Denies any worsening shortness of breath.  Has remained afebrile.  Blood pressure better today.  States she is on Coumadin at home however same held.  Discussed discharge planning, patient is eager for discharge home tomorrow, states son from out of [facility] would be leaving soon, discussed LTAC and close wound care however she adamantly refuses same, states she lives with her sister at home.  Patient does have frequent hospitalizations.  Labs reviewed.  Discussed with nursing.      Review of Systems   Constitutional: Negative for chills and fever.   Respiratory: Negative for shortness of breath.    Cardiovascular: Negative for chest pain.   Gastrointestinal: Negative for nausea and vomiting.   Skin: Positive for color change and wound.   Neurological:  Negative for headaches.   Psychiatric/Behavioral: Negative for confusion.     Objective:     Vital Signs (Most Recent):  Temp: 98.1 °F (36.7 °C) (07/14/20 1653)  Pulse: 63 (07/14/20 1653)  Resp: 18 (07/14/20 1653)  BP: (!) 136/52 (07/14/20 1653)  SpO2: 99 % (07/14/20 1653) Vital Signs (24h Range):  Temp:  [97.4 °F (36.3 °C)-98.1 °F (36.7 °C)] 98.1 °F (36.7 °C)  Pulse:  [63-92] 63  Resp:  [16-20] 18  SpO2:  [99 %-100 %] 99 %  BP: (136-155)/(52-80) 136/52     Weight: 40.9 kg (90 lb 2.7 oz)  Body mass index is 15 kg/m².  No intake or output data in the 24 hours ending 07/14/20 1709   Physical Exam  Vitals signs and nursing note reviewed.   Constitutional:       Appearance: Normal appearance.      Comments: Chronically ill-appearing, sitting in bed eating dinner, cooperative   HENT:      Head: Normocephalic and atraumatic.   Eyes:      General:         Right eye: No discharge.         Left eye: No discharge.   Cardiovascular:      Rate and Rhythm: Normal rate.      Heart sounds: Murmur present.      Comments: Significantly reduced peripheral pulses  Pulmonary:      Comments: On supplemental oxygen via nasal cannula, reduced air entry at bases  Abdominal:      General: There is no distension.      Tenderness: There is no abdominal tenderness. There is no guarding.   Skin:     Comments: Left inner thigh with wound VAC applied.  AV fistula in place.   Neurological:      Mental Status: She is alert.   Psychiatric:         Mood and Affect: Mood normal.         Significant Labs:   Bilirubin:   Recent Labs   Lab 06/15/20  0214 07/07/20  1040 07/08/20  0611   BILITOT 1.6* 1.3* 0.9     Blood Culture: No results for input(s): LABBLOO in the last 48 hours.  BMP:   Recent Labs   Lab 07/14/20  0552   *   *   K 4.8   CL 96   CO2 23   BUN 29*   CREATININE 5.7*   CALCIUM 8.2*     CBC:   Recent Labs   Lab 07/14/20  0552   WBC 6.10   HGB 8.7*   HCT 28.6*        CMP:   Recent Labs   Lab 07/13/20  0501 07/14/20  0581    * 134*   K 5.4* 4.8   CL 97 96   CO2 23 23   GLU 55* 141*   BUN 41* 29*   CREATININE 7.7* 5.7*   CALCIUM 8.4* 8.2*   ALBUMIN 2.5* 2.6*   ANIONGAP 14 15   EGFRNONAA 4.7* 6.8*     Lactic Acid: No results for input(s): LACTATE in the last 48 hours.  Magnesium: No results for input(s): MG in the last 48 hours.  POCT Glucose: No results for input(s): POCTGLUCOSE in the last 48 hours.  Prealbumin: No results for input(s): PREALBUMIN in the last 48 hours.  Respiratory Culture: No results for input(s): GSRESP, RESPIRATORYC in the last 48 hours.  Troponin: No results for input(s): TROPONINI in the last 48 hours.  Urine Culture: No results for input(s): LABURIN in the last 48 hours.  Urine Studies: No results for input(s): COLORU, APPEARANCEUA, PHUR, SPECGRAV, PROTEINUA, GLUCUA, KETONESU, BILIRUBINUA, OCCULTUA, NITRITE, UROBILINOGEN, LEUKOCYTESUR, RBCUA, WBCUA, BACTERIA, SQUAMEPITHEL, HYALINECASTS in the last 48 hours.    Invalid input(s): WRIGHTSUR  All pertinent labs within the past 24 hours have been reviewed.    Significant Imaging: I have reviewed all pertinent imaging results/findings within the past 24 hours.     X-ray Chest Ap Portable    Result Date: 7/7/2020  CLINICAL HISTORY: 74 years (1945) Female Chronic kidney disease Left leg pain/ wound medial left thigh area / hx of mi, chf, pvd, pneumonia TECHNIQUE: Portable AP radiograph the chest. COMPARISON: Most recent radiograph from June 14, 2020 FINDINGS: There is vascular congestion with increased interstitial markings findings indicating mild pulmonary edema improved from the previous exam.  There is blunting of both costophrenic angles consistent with trace pleural effusions and adjacent atelectasis. No pneumothorax is identified. The cardiac silhouette is markedly enlarged, with a metallic annular aortic valve replacement. Atheromatous calcifications are seen at the aortic arch. Osseous structures show degenerative changes in the spine. The visualized  upper abdomen is unremarkable. IMPRESSION: Cardiomegaly and findings of mild pulmonary edema with trace bilateral pleural effusions. . Electronically Signed by VALENTINA Rodriguez on 7/7/2020 11:20 AM    Echocardiogram  · Eccentric left ventricular hypertrophy.  · Severely decreased left ventricular systolic function. The estimated ejection fraction is 25%.  · Grade IV (severe) left ventricular diastolic dysfunction.  · Moderate right ventricular enlargement.  · Moderately reduced right ventricular systolic function.  · Severe left atrial enlargement.  · Layered left atrial thrombus suggested. The thrombus is fixed and located in the inferior cavity.  · Moderate right atrial enlargement.  · There is a bioprosthetic mitral valve. Prosthetic mitral valve is normal.  · Moderate tricuspid regurgitation.  · Mild to moderate pulmonary hypertension present. Estimated PA systolic pressure 50 mm of Hg.  · Mild to moderate pulmonic regurgitation.  · Small posterior pericardial effusion.      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 Elam with wound VAC applied  Continue local wound care, as per patient wound care will evaluate tomorrow to determine if wound VAC to continue or removed  Patient has declined LTAC placement, high risk for rehospitalization, only wishes to return home with home health  Continue p.r.n. p.o. pain control  Continue sodium thiosulfate with dialysis  Appreciate general surgery input    Peripheral vascular disease now with left lower extremity occlusion  No significant peripheral vascular disease, not amendable to intervention, significant calcifications      Other persistent atrial fibrillation  Persistent atrial  fibrillation followed by Dr. Powell  Resuming Coumadin at 3 mg with daily INR.      Malnutrition of moderate degree  BMI of 15 with muscle wasting with chronic medical condition.      Chronic pain on chronic narcotics  Cautious use as patient has had increased somnolence with additional doses of narcotics.      Anemia  Anemia chronic disease.  Continue DONNY with dialysis.  Trending CBC.      H/O mitral valve replacement  Patient with a history of mitral valve replacement March 2019      Chronic systolic heart failure  Reduced EF on recent echo.  Dialysis dependent.      Left atrial thrombus  Resuming Coumadin.  Daily INR.      Chronic respiratory failure  Patient on 3-4 chronic home oxygen.      Pulmonary hypertension  Last echocardiogram with PASP 50      ESRD (end stage renal disease) on HD M,W, F  Known ESRD on HD MWF.  Patient is followed by Dr. Ingram.  Complicated by hyperphosphatemia, secondary hyperparathyroidism, anemia.  Continue HD as per nephrology.  Renally dosing all medications.      Tobacco dependence  Nicotine dependence, continues to smoke despite smoking cessation counseling        VTE Risk Mitigation (From admission, onward)         Ordered     warfarin (COUMADIN) tablet 3 mg  Once      07/14/20 1729     Reason for No Pharmacological VTE Prophylaxis  Once     Question:  Reasons:  Answer:  Already adequately anticoagulated on oral Anticoagulants    07/07/20 1331     IP VTE HIGH RISK PATIENT  Once      07/07/20 1331                      Lissa Portillo MD  Department of Hospital Medicine   [facility]

## 2020-07-14 NOTE — PROGRESS NOTES
Progress Note  [facility]    Admit Date: 7/7/2020   LOS: 6 days     SUBJECTIVE:     Follow-up For:  ESRD    HD 7/13 3L UF  Pain thigh as usual  No c/o otherwise  Anxious to go home    Review of Systems:  GENERAL: Pain thigh wd with wd vac; No fever, chills, night sweats, decreased intake, weight loss.  HEENT: No blurry vision, decreased vision, floaters, hearing defects, sore throat.  CV:  No CP, ARGUELLO, orthopnea, PND, palpitations.  PULM:  No cough, hemoptysis, wheezing.  GI:  No nausea, vomiting, diarrhea, BRBPR, melena, abdominal pain.  :  No dysuria, frequency, urgency, hematuria.  NEURO: No LOC, seizure activity, dizziness.  SKIN:  No rash, pruritis, petechiae  MS:  No arthralgia, muscle aches, arthritis  PSYCH: No depression, anxiety  ENDO:  No polydipsia, polyuria, heat or cold intolerance  Dialysis fistula right arm     OBJECTIVE:     Vital Signs (Most Recent)  Temp: 97.9 °F (36.6 °C) (07/14/20 0700)  Pulse: 83 (07/14/20 0700)  Resp: 19 (07/14/20 0907)  BP: (!) 154/71 (07/14/20 0700)  SpO2: 100 % (07/14/20 0700)    Vital Signs Range (Last 24H):  Temp:  [97.4 °F (36.3 °C)-97.9 °F (36.6 °C)]   Pulse:  [59-87]   Resp:  [16-20]   BP: (127-169)/(48-88)   SpO2:  [97 %-100 %]     I/O last 3 completed shifts:  In: 550 [Other:550]  Out: 3050 [Other:3050]    Physical Exam:   General: Awake and alert.  HEENT: No scleral icterus, MM moist.  NECK: No JVD. No carotid bruits.  CV: Iregular rate and rhthym with murmur  PULM: Clear without crackles, rhonchi, wheezes.  ABD: Soft, nl BS, NT, ND.  EXTR: No edema, clubbing, cyanosis.   NEURO: Non focal and grossly intact.  SKIN: Wd left thigh wd vac in place sanguinous drainage, uninterrupted   MS: No joint effusions or erythema.   PSYCH: Normal Mood.  Right arm fistula positive thrill and bruit       Laboratory:  Recent Labs   Lab 07/14/20  0552   HGB 8.7*   HCT 28.6*   WBC 6.10          Recent Labs   Lab 07/12/20  0610 07/13/20  0501 07/14/20  0552    * 134* 134*   K 5.0 5.4* 4.8   CL 95 97 96   CO2 25 23 23   BUN 29* 41* 29*   CREATININE 6.4* 7.7* 5.7*   GLU 56* 55* 141*   CALCIUM 8.7 8.4* 8.2*   PHOS 5.3* 6.4* 6.0*       Lab Results   Component Value Date    PTH 75.6 07/09/2020    CALCIUM 8.2 (L) 07/14/2020    CAION 0.84 (L) 07/26/2017    PHOS 6.0 (H) 07/14/2020       Lab Results   Component Value Date    URICACID 4.0 03/17/2020       BNP  No results for input(s): BNP in the last 168 hours.    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       Nephrology Assessment/Plan:    ESRD  -continue MWF schedule  -Renal dose all meds  -Low Phos/potassium diet  -Labs pre dialysis     Calciphylaxis:  -phosphorus controlled with binders, continue as ordered with meals  -sodium thiosulfate with dialysis  -aggressive wound care  -Education on disease process, prevention, complications     Left thigh wound:  -s/p I&D with wd vac  -I have rediscussed the need for long-term wound care and LTAC  -Continue sodium thiosulfate post dialysis which she has been receiving outpatient     Hyperkalemia:  -using a low K dialysate  -monitor serum potassium     Anemia:  -DONNY's with dialysis  -no symptoms of bleeding  -no thrombocytopenia     Secondary hyperparathyroidism:  -hypocalcemia corrects with hypoalbuminemia  -above goal range 3-6, binders with meals   -Diet education and compliance addressed  -monitor calcium and phosphorus; last PTH okay     Hypertension:  -volume good  -variable control but acceptable  -control pain  -continue current meds       RL Chung/Joseph Ingram M.D.

## 2020-07-14 NOTE — CARE UPDATE
07/13/20 1730   Patient Assessment/Suction   Level of Consciousness (AVPU) alert   Respiratory Effort Normal;Unlabored   All Lung Fields Breath Sounds clear   Cough Type nonproductive   PRE-TX-O2   O2 Device (Oxygen Therapy) nasal cannula   $ Is the patient on Low Flow Oxygen? Yes   Flow (L/min) 3   Pulse Oximetry Type Intermittent   $ Pulse Oximetry - Multiple Charge Pulse Oximetry - Multiple   Pulse 69   Resp 17   Aerosol Therapy   $ Aerosol Therapy Charges PRN treatment not required

## 2020-07-14 NOTE — ASSESSMENT & PLAN NOTE
No significant peripheral vascular disease, not amendable to intervention, significant calcifications

## 2020-07-14 NOTE — PROGRESS NOTES
"[facility]  Adult Nutrition   Progress Note (Follow-Up)    SUMMARY     Recommendations  Recommendation/Intervention: 1. Continue diet as tolerated by pt, encourage intake 2.  to assist with meal choices daily 3. RD to discontinue Nepro--not consuming  Goals: 1. Patient to meet at least 75% estimated needs via PO diet  Nutrition Goal Status: progressing towards goal    Dietitian Rounds Brief    Pt seen for f/u. Not consuming hospital meals due to dislike of meals. Eating well, but outside foods provided by family. No N/V reported. Not consuming Nepro--will discontinue. Noted wound vac to LE. Declined Michael supplement. RD encouraged continued adequate nutrition for wound healing.    Reason for Assessment  Reason For Assessment: RD follow-up  Diagnosis: infection/sepsis  Relevant Medical History: CHF, HTN, ,itral valve regurgitation, ESRD-PD, anemia, malnutrition  Interdisciplinary Rounds: did not attend    Nutrition Risk Screen  Nutrition Risk Screen: large or nonhealing wound, burn or pressure injury    Nutrition/Diet History  Food Allergies: NKFA  Factors Affecting Nutritional Intake: other (see comments)(dislike of meals)    Anthropometrics  Temp: 97.9 °F (36.6 °C)  Height Method: Stated  Height: 5' 5" (165.1 cm)  Height (inches): 65 in  Weight Method: Bed Scale  Weight: 40.9 kg (90 lb 2.7 oz)  Weight (lb): 90.17 lb  Ideal Body Weight (IBW), Female: 125 lb  % Ideal Body Weight, Female (lb): 100.53 %  BMI (Calculated): 15  BMI Grade: less than 16 protein-energy malnutrition grade III       Weight History:  Wt Readings from Last 10 Encounters:   07/10/20 40.9 kg (90 lb 2.7 oz)   06/14/20 54.8 kg (120 lb 13 oz)   06/05/20 61.2 kg (134 lb 14.7 oz)   06/03/20 69.4 kg (153 lb)   05/12/20 65 kg (143 lb 4.8 oz)   05/01/20 65.8 kg (145 lb)   04/23/20 62.6 kg (138 lb 0.1 oz)   04/14/20 59.3 kg (130 lb 11.2 oz)   03/19/20 58.7 kg (129 lb 6.4 oz)   02/19/20 (P) 64 kg (141 lb)       Lab/Procedures/Meds: " Pertinent Labs Reviewed  Clinical Chemistry:  Recent Labs   Lab 07/08/20  0611  07/12/20  0610 07/13/20  0501 07/14/20  0552      < > 134* 134* 134*   K 5.6*   < > 5.0 5.4* 4.8   CL 96   < > 95 97 96   CO2 26   < > 25 23 23   GLU 56*   < > 56* 55* 141*   BUN 42*   < > 29* 41* 29*   CREATININE 6.1*   < > 6.4* 7.7* 5.7*   CALCIUM 8.2*   < > 8.7 8.4* 8.2*   PROT 6.9  --   --   --   --    ALBUMIN 2.7*   < > 2.8* 2.5* 2.6*   BILITOT 0.9  --   --   --   --    ALKPHOS 110  --   --   --   --    AST 26  --   --   --   --    ALT 17  --   --   --   --    ANIONGAP 15   < > 14 14 15   ESTGFRAFRICA 7.3*   < > 6.8* 5.5* 7.9*   EGFRNONAA 6.3*   < > 5.9* 4.7* 6.8*   MG 2.5  --   --   --   --    PHOS 6.3*   < > 5.3* 6.4* 6.0*    < > = values in this interval not displayed.     CBC:   Recent Labs   Lab 07/14/20  0552   WBC 6.10   RBC 3.15*   HGB 8.7*   HCT 28.6*      MCV 91   MCH 27.6   MCHC 30.4*       Thyroid & Parathyroid:  Recent Labs   Lab 07/07/20  1040   TSH 3.920       Medications: Pertinent Medications reviewed  Scheduled Meds:   aspirin  81 mg Oral Daily    collagenase   Topical (Top) Daily    diltiaZEM  180 mg Oral Daily    epoetin alfonzo-epbx  50 Units/kg Intravenous Every Mon, Wed, Fri    isosorbide mononitrate  30 mg Oral Daily    losartan  25 mg Oral Daily    magnesium oxide  400 mg Oral Daily    metoprolol tartrate  25 mg Oral BID    mupirocin  1 g Nasal BID    pantoprazole  40 mg Oral BID    sevelamer carbonate  1,600 mg Oral TID WM    zinc sulfate  220 mg Oral Daily     Continuous Infusions:  PRN Meds:.sodium chloride 0.9%, acetaminophen, albuterol sulfate, HYDROcodone-acetaminophen, morphine, ondansetron, ondansetron, ondansetron, oxyCODONE-acetaminophen, senna-docusate 8.6-50 mg, sodium chloride 0.9%, sodium chloride 0.9%, traMADoL    Estimated/Assessed Needs  Weight Used For Calorie Calculations: 40.9 kg (90 lb 2.7 oz)  Energy Calorie Requirements (kcal): 5452-6613 kcals/day (40-45  kcals/kg)  Energy Need Method: Kcal/kg  Protein Requirements: 62-82 g/day (1.5-2.0 g/kg)  Weight Used For Protein Calculations: 40.9 kg (90 lb 2.7 oz)        RDA Method (mL): 1636       Nutrition Prescription Ordered  Current Diet Order: 2000 ADA  Oral Nutrition Supplement: Nepro TID    Evaluation of Received Nutrient/Fluid Intake  Energy Calories Required: meeting needs  Protein Required: meeting needs  Fluid Required: meeting needs  Tolerance: tolerating(outside foods)     Intake/Output Summary (Last 24 hours) at 7/14/2020 0912  Last data filed at 7/13/2020 1450  Gross per 24 hour   Intake 550 ml   Output 3050 ml   Net -2500 ml        % Meal Intake: 75 - 100 %    Nutrition Risk  Level of Risk/Frequency of Follow-up: moderate - high     Monitor and Evaluation  Food and Nutrient Intake: food and beverage intake, energy intake  Food and Nutrient Adminstration: diet order  Physical Activity and Function: nutrition-related ADLs and IADLs  Anthropometric Measurements: weight change, weight  Biochemical Data, Medical Tests and Procedures: electrolyte and renal panel, gastrointestinal profile, glucose/endocrine profile  Nutrition-Focused Physical Findings: overall appearance     Nutrition Follow-Up  RD Follow-up?: Yes    Treasure Daley RD 07/14/2020 9:12 AM

## 2020-07-14 NOTE — PLAN OF CARE
Initial discharge planning assessment completed at bedside with patient and son, Jovany who lives in [place]. [facility] currently with [facility] and desires to resume upon discharge. Son voices question about going to [facility] for rehab prior to going home. Instructed will see how well patient does with therapy and will review the recommendation. Understanding verbalized. CM following.      07/14/20 2488   Discharge Assessment   Assessment Type Discharge Planning Assessment   Confirmed/corrected address and phone number on facesheet? Yes   Assessment information obtained from? Patient   Prior to hospitilization cognitive status: Alert/Oriented   Prior to hospitalization functional status: Partially Dependent   Current cognitive status: Alert/Oriented   Current Functional Status: Partially Dependent   Lives With sibling(s)   Able to Return to Prior Arrangements yes   Who are your caregiver(s) and their phone number(s)? Aide Watson - [employer] - [phone]   Readmission Within the Last 30 Days current reason for admission unrelated to previous admission   If yes, most recent facility name: Critical access hospital   Patient currently receives any other outside agency services? Yes   Name and contact number of agency or person providing outside services [facility]   Is it the patient/care giver preference to resume care with the current outside agency? Yes   Equipment Currently Used at Home wheelchair;walker, rolling;hospital bed   Part D Coverage [employer]   Do you have any problems affording any of your prescribed medications? No   Is the patient taking medications as prescribed? yes   Does the patient have transportation home? Yes   Transportation Anticipated family or friend will provide   Discharge Plan A Home Health   Discharge Plan B Long-term acute care facility (LTAC)   DME Needed Upon Discharge  none   Patient/Family in Agreement with Plan yes   Readmission Questionnaire   At the time of your  discharge, did someone talk to you about what your health problems were? Yes   At the time of discharge, did someone talk to you about what to watch out for regarding worsening of your health problem? Yes   At the time of discharge, did someone talk to you about what to do if you experienced worsening of your health problem? Yes   At the time of discharge, did someone talk to you about which medication to take when you left the hospital and which ones to stop taking? Yes   At the time of discharge, did someone talk to you about when and where to follow up with a doctor after you left the hospital? Yes   How often do you need to have someone help you when you read instructions, pamphlets, or other written material from your doctor or pharmacy? Sometimes   Do you have problems taking your medications as prescribed? No   Do you have any problems affording any of  your prescribed medications? No   Do you have problems obtaining/receiving your medications? No   Does the patient have transportation to healthcare appointments? Yes   Living Arrangements house   Does the patient have family/friends to help with healtcare needs after discharge? yes   Does your caregiver provide all the help you need? Yes   Are you currently feeling confused? No   Are you currently having problems thinking? No   Are you currently having memory problems? No   Have you felt down, depressed, or hopeless? 0   Have you felt little interest or pleasure in doing things? 0   In the last 7 days, my sleep quality was: good

## 2020-07-14 NOTE — ASSESSMENT & PLAN NOTE
Persistent atrial fibrillation followed by Dr. Powell  Resuming Coumadin at 3 mg with daily INR.

## 2020-07-14 NOTE — ASSESSMENT & PLAN NOTE
Known ESRD on HD MWF.  Patient is followed by Dr. Ingram.  Complicated by hyperphosphatemia, secondary hyperparathyroidism, anemia.  Continue HD as per nephrology.  Renally dosing all medications.

## 2020-07-14 NOTE — PT/OT/SLP PROGRESS
"Physical Therapy Treatment    Patient Name:  Griselda Neely   MRN:  [id number]    Recommendations:     Discharge Recommendations:  LTACH (long-term acute care [facility] [place])   Discharge Equipment Recommendations: none   Barriers to discharge: wound vac    Assessment:     Griselda Neely is a 74 y.o. 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  She presents with the following impairments/functional limitations:  weakness, impaired endurance, impaired sensation, impaired functional mobilty, gait instability, impaired balance, decreased coordination, decreased lower extremity function, decreased safety awareness, pain, impaired cardiopulmonary response to activity.    Rehab Prognosis: Good and Fair; patient would benefit from acute skilled PT services to address these deficits and reach maximum level of function.    Recent Surgery: Procedure(s) (LRB):  DEBRIDEMENT, WOUND (Left) 1 Day Post-Op    Plan:     During this hospitalization, patient to be seen 5 x/week to address the identified rehab impairments via gait training, therapeutic activities, therapeutic exercises, neuromuscular re-education and progress toward the following goals:    · Plan of Care Expires:  07/30/20    Subjective     Chief Complaint: RLE pain  Patient/Family Comments/goals: "Y'all need to make sure I get my pain medicine before you do this stuff to me."  Pain/Comfort:  · Pain Rating 1: 8/10  · Location - Side 1: Left  · Location - Orientation 1: lower  · Location 1: leg  · Pain Addressed 1: Pre-medicate for activity      Objective:     Communicated with nurse prior to session.  Patient found supine with oxygen, wound vac(2L/min) upon PT entry to room.     General Precautions: Standard, fall   Orthopedic Precautions:N/A   Braces:       Functional Mobility:  · Bed Mobility:     · Rolling Right: contact guard assistance  · Supine to Sit: contact guard assistance  · Sit to " Supine: contact guard assistance  · Transfers:     · Sit to Stand:  contact guard assistance with rolling walker  · Gait: 14' using RW requiring Yaya      AM-PAC 6 CLICK MOBILITY  Turning over in bed (including adjusting bedclothes, sheets and blankets)?: 3  Sitting down on and standing up from a chair with arms (e.g., wheelchair, bedside commode, etc.): 3  Moving from lying on back to sitting on the side of the bed?: 3  Moving to and from a bed to a chair (including a wheelchair)?: 3  Need to walk in hospital room?: 3  Climbing 3-5 steps with a railing?: 1  Basic Mobility Total Score: 16       Therapeutic Activities and Exercises:   Pt did not want to sit [facility] for lunch, then refused lunch provided.    Patient left HOB elevated with all lines intact, call button in reach, bed alarm on and nurse notified..    GOALS:   Multidisciplinary Problems     Physical Therapy Goals        Problem: Physical Therapy Goal    Goal Priority Disciplines Outcome Goal Variances Interventions   Physical Therapy Goal     PT, PT/OT Ongoing, Progressing     Description: Goals to be met by: 2020     Patient will increase functional independence with mobility by performin. Supine to sit with Contact Guard Assistance  2. Sit to stand transfer with Contact Guard Assistance  3. Bed to chair transfer with Contact Guard Assistance using Rolling Walker  4. Gait  x 150 feet with Contact Guard Assistance using Rolling Walker.   5. Lower extremity exercise program x20 reps                      Time Tracking:     PT Received On: 20  PT Start Time: 1154     PT Stop Time: 1209  PT Total Time (min): 15 min     Billable Minutes: Gait Training 15    Treatment Type: Treatment  PT/PTA: PT     PTA Visit Number: 0     Lizett Dunbar, ANNMARIE  2020

## 2020-07-14 NOTE — CONSULTS
"Met with patient at bedside discussing LTAC. Son no longer at bedside. Patient strongly voices, "I am not going anywhere but home with home health. My son is leaving on Friday. It doesn't matter what he wants. My sister can take care of me with home health". Nurse and MD updated. CM following.   "

## 2020-07-14 NOTE — SUBJECTIVE & OBJECTIVE
Interval History:  Patient continues to report moderate intensity left thigh wound, chronic, wound VAC in place.  States she is on 3-4 L home oxygen chronically.  Denies any worsening shortness of breath.  Has remained afebrile.  Blood pressure better today.  States she is on Coumadin at home however same held.  Discussed discharge planning, patient is eager for discharge home tomorrow, states son from out of town would be leaving soon, discussed LTAC and close wound care however she adamantly refuses same, states she lives with her sister at home.  Patient does have frequent hospitalizations.  Labs reviewed.  Discussed with nursing.      Review of Systems   Constitutional: Negative for chills and fever.   Respiratory: Negative for shortness of breath.    Cardiovascular: Negative for chest pain.   Gastrointestinal: Negative for nausea and vomiting.   Skin: Positive for color change and wound.   Neurological: Negative for headaches.   Psychiatric/Behavioral: Negative for confusion.     Objective:     Vital Signs (Most Recent):  Temp: 98.1 °F (36.7 °C) (07/14/20 1653)  Pulse: 63 (07/14/20 1653)  Resp: 18 (07/14/20 1653)  BP: (!) 136/52 (07/14/20 1653)  SpO2: 99 % (07/14/20 1653) Vital Signs (24h Range):  Temp:  [97.4 °F (36.3 °C)-98.1 °F (36.7 °C)] 98.1 °F (36.7 °C)  Pulse:  [63-92] 63  Resp:  [16-20] 18  SpO2:  [99 %-100 %] 99 %  BP: (136-155)/(52-80) 136/52     Weight: 40.9 kg (90 lb 2.7 oz)  Body mass index is 15 kg/m².  No intake or output data in the 24 hours ending 07/14/20 1709   Physical Exam  Vitals signs and nursing note reviewed.   Constitutional:       Appearance: Normal appearance.      Comments: Chronically ill-appearing, sitting in bed eating dinner, cooperative   HENT:      Head: Normocephalic and atraumatic.   Eyes:      General:         Right eye: No discharge.         Left eye: No discharge.   Cardiovascular:      Rate and Rhythm: Normal rate.      Heart sounds: Murmur present.      Comments:  Significantly reduced peripheral pulses  Pulmonary:      Comments: On supplemental oxygen via nasal cannula, reduced air entry at bases  Abdominal:      General: There is no distension.      Tenderness: There is no abdominal tenderness. There is no guarding.   Skin:     Comments: Left inner thigh with wound VAC applied.  AV fistula in place.   Neurological:      Mental Status: She is alert.   Psychiatric:         Mood and Affect: Mood normal.         Significant Labs:   Bilirubin:   Recent Labs   Lab 06/15/20  0214 07/07/20  1040 07/08/20  0611   BILITOT 1.6* 1.3* 0.9     Blood Culture: No results for input(s): LABBLOO in the last 48 hours.  BMP:   Recent Labs   Lab 07/14/20  0552   *   *   K 4.8   CL 96   CO2 23   BUN 29*   CREATININE 5.7*   CALCIUM 8.2*     CBC:   Recent Labs   Lab 07/14/20  0552   WBC 6.10   HGB 8.7*   HCT 28.6*        CMP:   Recent Labs   Lab 07/13/20  0501 07/14/20  0552   * 134*   K 5.4* 4.8   CL 97 96   CO2 23 23   GLU 55* 141*   BUN 41* 29*   CREATININE 7.7* 5.7*   CALCIUM 8.4* 8.2*   ALBUMIN 2.5* 2.6*   ANIONGAP 14 15   EGFRNONAA 4.7* 6.8*     Lactic Acid: No results for input(s): LACTATE in the last 48 hours.  Magnesium: No results for input(s): MG in the last 48 hours.  POCT Glucose: No results for input(s): POCTGLUCOSE in the last 48 hours.  Prealbumin: No results for input(s): PREALBUMIN in the last 48 hours.  Respiratory Culture: No results for input(s): GSRESP, RESPIRATORYC in the last 48 hours.  Troponin: No results for input(s): TROPONINI in the last 48 hours.  Urine Culture: No results for input(s): LABURIN in the last 48 hours.  Urine Studies: No results for input(s): COLORU, APPEARANCEUA, PHUR, SPECGRAV, PROTEINUA, GLUCUA, KETONESU, BILIRUBINUA, OCCULTUA, NITRITE, UROBILINOGEN, LEUKOCYTESUR, RBCUA, WBCUA, BACTERIA, SQUAMEPITHEL, HYALINECASTS in the last 48 hours.    Invalid input(s): DONISR  All pertinent labs within the past 24 hours have been  reviewed.    Significant Imaging: I have reviewed all pertinent imaging results/findings within the past 24 hours.     X-ray Chest Ap Portable    Result Date: 7/7/2020  CLINICAL HISTORY: 74 years (1945) Female Chronic kidney disease Left leg pain/ wound medial left thigh area / hx of mi, chf, pvd, pneumonia TECHNIQUE: Portable AP radiograph the chest. COMPARISON: Most recent radiograph from June 14, 2020 FINDINGS: There is vascular congestion with increased interstitial markings findings indicating mild pulmonary edema improved from the previous exam.  There is blunting of both costophrenic angles consistent with trace pleural effusions and adjacent atelectasis. No pneumothorax is identified. The cardiac silhouette is markedly enlarged, with a metallic annular aortic valve replacement. Atheromatous calcifications are seen at the aortic arch. Osseous structures show degenerative changes in the spine. The visualized upper abdomen is unremarkable. IMPRESSION: Cardiomegaly and findings of mild pulmonary edema with trace bilateral pleural effusions. . Electronically Signed by VALENTINA Rodriguez on 7/7/2020 11:20 AM    Echocardiogram  · Eccentric left ventricular hypertrophy.  · Severely decreased left ventricular systolic function. The estimated ejection fraction is 25%.  · Grade IV (severe) left ventricular diastolic dysfunction.  · Moderate right ventricular enlargement.  · Moderately reduced right ventricular systolic function.  · Severe left atrial enlargement.  · Layered left atrial thrombus suggested. The thrombus is fixed and located in the inferior cavity.  · Moderate right atrial enlargement.  · There is a bioprosthetic mitral valve. Prosthetic mitral valve is normal.  · Moderate tricuspid regurgitation.  · Mild to moderate pulmonary hypertension present. Estimated PA systolic pressure 50 mm of Hg.  · Mild to moderate pulmonic regurgitation.  · Small posterior pericardial effusion.

## 2020-07-14 NOTE — PROGRESS NOTES
[facility]  General Surgery  Progress Note    Subjective:     Interval History: Patient feels little better after wound excision. Afebrile. Wound VAC is inplace with good seal.      Post-Op Info:  Procedure(s) (LRB):  DEBRIDEMENT, WOUND (Left)   1 Day Post-Op      Medications:  Continuous Infusions:  Scheduled Meds:   aspirin  81 mg Oral Daily    collagenase   Topical (Top) Daily    diltiaZEM  180 mg Oral Daily    epoetin alfonzo-epbx  50 Units/kg Intravenous Every Mon, Wed, Fri    isosorbide mononitrate  30 mg Oral Daily    losartan  25 mg Oral Daily    magnesium oxide  400 mg Oral Daily    metoprolol tartrate  25 mg Oral BID    mupirocin  1 g Nasal BID    pantoprazole  40 mg Oral BID    sevelamer carbonate  1,600 mg Oral TID WM    zinc sulfate  220 mg Oral Daily     PRN Meds:sodium chloride 0.9%, acetaminophen, albuterol sulfate, HYDROcodone-acetaminophen, morphine, ondansetron, ondansetron, ondansetron, oxyCODONE-acetaminophen, senna-docusate 8.6-50 mg, sodium chloride 0.9%, sodium chloride 0.9%, traMADoL     Objective:     Vital Signs (Most Recent):  Temp: 98 °F (36.7 °C) (07/14/20 1205)  Pulse: 92 (07/14/20 1205)  Resp: 18 (07/14/20 1307)  BP: (!) 149/80 (07/14/20 1205)  SpO2: 100 % (07/14/20 1205) Vital Signs (24h Range):  Temp:  [97.4 °F (36.3 °C)-98 °F (36.7 °C)] 98 °F (36.7 °C)  Pulse:  [69-92] 92  Resp:  [16-20] 18  SpO2:  [100 %] 100 %  BP: (139-155)/(62-80) 149/80     No intake or output data in the 24 hours ending 07/14/20 1517    Physical Exam  Constitutional:       General: She is not in acute distress.  Pulmonary:      Effort: Pulmonary effort is normal. No respiratory distress.   Skin:     Comments: Wound vac in place with good seal. Less tender around the wound edge    Neurological:      Mental Status: She is alert and oriented to person, place, and time.         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 Will evaluate need for continued wound vac verses other wound care techniques.     Carlos Elam III, MD  General Surgery  [facility]

## 2020-07-15 VITALS
RESPIRATION RATE: 18 BRPM | BODY MASS INDEX: 15.03 KG/M2 | SYSTOLIC BLOOD PRESSURE: 126 MMHG | WEIGHT: 90.19 LBS | DIASTOLIC BLOOD PRESSURE: 62 MMHG | TEMPERATURE: 98 F | HEART RATE: 71 BPM | OXYGEN SATURATION: 100 % | HEIGHT: 65 IN

## 2020-07-15 PROBLEM — I51.3 LEFT ATRIAL THROMBUS: Status: RESOLVED | Noted: 2020-01-03 | Resolved: 2020-07-15

## 2020-07-15 PROCEDURE — 63600175 PHARM REV CODE 636 W HCPCS: Mod: TB | Performed by: SURGERY

## 2020-07-15 PROCEDURE — 25000003 PHARM REV CODE 250: Performed by: SURGERY

## 2020-07-15 PROCEDURE — 90935 HEMODIALYSIS ONE EVALUATION: CPT

## 2020-07-15 PROCEDURE — 25000003 PHARM REV CODE 250: Performed by: INTERNAL MEDICINE

## 2020-07-15 RX ORDER — OXYCODONE AND ACETAMINOPHEN 5; 325 MG/1; MG/1
1 TABLET ORAL EVERY 8 HOURS PRN
Qty: 21 TABLET | Refills: 0 | Status: SHIPPED | OUTPATIENT
Start: 2020-07-15 | End: 2020-07-22

## 2020-07-15 RX ORDER — TRAMADOL HYDROCHLORIDE 50 MG/1
50 TABLET ORAL EVERY 8 HOURS PRN
Qty: 21 TABLET | Refills: 0 | Status: SHIPPED | OUTPATIENT
Start: 2020-07-15 | End: 2020-07-22

## 2020-07-15 RX ORDER — SODIUM THIOSULFATE 250 MG/ML
12.5 INJECTION, SOLUTION INTRAVENOUS ONCE
Status: COMPLETED | OUTPATIENT
Start: 2020-07-15 | End: 2020-07-15

## 2020-07-15 RX ADMIN — LOSARTAN POTASSIUM 25 MG: 25 TABLET, FILM COATED ORAL at 03:07

## 2020-07-15 RX ADMIN — PANTOPRAZOLE SODIUM 40 MG: 40 TABLET, DELAYED RELEASE ORAL at 06:07

## 2020-07-15 RX ADMIN — METOPROLOL TARTRATE 25 MG: 25 TABLET, FILM COATED ORAL at 03:07

## 2020-07-15 RX ADMIN — TRAMADOL HYDROCHLORIDE 50 MG: 50 TABLET, FILM COATED ORAL at 07:07

## 2020-07-15 RX ADMIN — HYDROCODONE BITARTRATE AND ACETAMINOPHEN 1 TABLET: 5; 325 TABLET ORAL at 06:07

## 2020-07-15 RX ADMIN — EPOETIN ALFA-EPBX 2900 UNITS: 10000 INJECTION, SOLUTION INTRAVENOUS; SUBCUTANEOUS at 08:07

## 2020-07-15 RX ADMIN — ONDANSETRON 8 MG: 4 TABLET, ORALLY DISINTEGRATING ORAL at 07:07

## 2020-07-15 RX ADMIN — ISOSORBIDE MONONITRATE 30 MG: 30 TABLET, EXTENDED RELEASE ORAL at 03:07

## 2020-07-15 RX ADMIN — DILTIAZEM HYDROCHLORIDE 180 MG: 180 CAPSULE, COATED, EXTENDED RELEASE ORAL at 03:07

## 2020-07-15 RX ADMIN — SEVELAMER CARBONATE 1600 MG: 800 TABLET, FILM COATED ORAL at 03:07

## 2020-07-15 RX ADMIN — ZINC SULFATE 220 MG (50 MG) CAPSULE 220 MG: CAPSULE at 03:07

## 2020-07-15 RX ADMIN — SODIUM THIOSULFATE 12.5 G: 250 INJECTION, SOLUTION INTRAVENOUS at 09:07

## 2020-07-15 RX ADMIN — ASPIRIN 81 MG: 81 TABLET, DELAYED RELEASE ORAL at 03:07

## 2020-07-15 RX ADMIN — MAGNESIUM OXIDE 400 MG: 400 TABLET ORAL at 03:07

## 2020-07-15 NOTE — PLAN OF CARE
07/15/20 1546   Final Note   Assessment Type Final Discharge Note   Anticipated Discharge Disposition Home-Health   Post-Acute Status   Post-Acute Authorization Home Health   Home Health Status Set-up Complete     Choice form completed for resumption of HHC per [facility]. Sent via Epic with call placed and pt is accepted to resume care.

## 2020-07-15 NOTE — CONSULTS
Patient has been approved for [facility]hab [employer] wound vac.  Patient going home with vac that is currently in use.  Small amount of dark  blood drainage in canister.  Approx. 50cc Son given the phone number for [facility] for [facility] [employer].  He will deliver supplies to her home.

## 2020-07-15 NOTE — PLAN OF CARE
Discussed plan of care with patient including maintaining wound vac, dialysis today, and medication management.  Verbalized understanding.

## 2020-07-15 NOTE — CONSULTS
Spoke to Dr. Elam left leg wound measures [id number],. Wound vac will need to be changed 2 times a week. -125 megahertz. Using green foam

## 2020-07-15 NOTE — PT/OT/SLP PROGRESS
Physical Therapy      Patient Name:  Griselda Neely   MRN:  [id number]    Patient not seen today secondary to Dialysis. Will follow-up 07/16/20.    Jackie Toro, PT

## 2020-07-15 NOTE — RESPIRATORY THERAPY
"   07/14/20 2118   Patient Assessment/Suction   Level of Consciousness (AVPU) alert   Respiratory Effort Normal;Unlabored   Expansion/Accessory Muscles/Retractions expansion symmetric;no retractions;no use of accessory muscles   All Lung Fields Breath Sounds clear   Rhythm/Pattern, Respiratory no shortness of breath reported;pattern regular;unlabored   PRE-TX-O2   O2 Device (Oxygen Therapy) nasal cannula   Flow (L/min) 3   SpO2 98 %   Pulse Oximetry Type Intermittent   $ Pulse Oximetry - Multiple Charge Pulse Oximetry - Multiple   Pulse 69   Resp 20   Aerosol Therapy   $ Aerosol Therapy Charges PRN treatment not required   Respiratory Interventions   Cough And Deep Breathing done with encouragement   Breathing Techniques/Airway Clearance deep/controlled cough encouraged;diaphragmatic breathing promoted   Respiratory Evaluation   $ Care Plan Tech Time 15 min   Evaluation For   (care plan)   Home Oxygen   Has Home Oxygen? Yes   Liter Flow 3   Duration continuous   Route nasal cannula   Mode continuous   Device home concentrator   Home Aerosol, MDI, DPI, and Other Treatments/Therapies   Home Respiratory Therapy Per Patient/Review of Chart No   Oxygen Care Plan   Oxygen Care Plan Per Protocol   SPO2 Goal (%) MD order   Rationale Maintain Home oxygen   Bronchodilator Care Plan   Bronchodilator Care Plan Aerosol   Aerosol Meds w/ frequency Ventolin/Proventil(Albuterol Sulfate) 2.5mg PRN   Rationale Chest "tightness" with breathing;Shortness of breath (increased WOB)     "

## 2020-07-15 NOTE — PROGRESS NOTES
Progress Note  [facility]    Admit Date: 7/7/2020   LOS: 7 days     SUBJECTIVE:     Follow-up For:  ESRD    HD 7/15 3L UF, no cramping on machine  Pain thigh as usual  Anxious to go home    Review of Systems:  GENERAL: Pain thigh wd with wd vac; No fever, chills, night sweats, decreased intake, weight loss.  HEENT: No blurry vision, decreased vision, floaters, hearing defects, sore throat.  CV:  No CP, ARGUELLO, orthopnea, PND, palpitations.  PULM:  No cough, hemoptysis, wheezing.  GI:  No nausea, vomiting, diarrhea, BRBPR, melena, abdominal pain.  :  No dysuria, frequency, urgency, hematuria.  NEURO: No LOC, seizure activity, dizziness.  SKIN:  No rash, pruritis, petechiae  MS:  No arthralgia, muscle aches, arthritis  PSYCH: No depression, anxiety  ENDO:  No polydipsia, polyuria, heat or cold intolerance  Dialysis fistula right arm     OBJECTIVE:     Vital Signs (Most Recent)  Temp: 98 °F (36.7 °C) (07/15/20 0755)  Pulse: 65 (07/15/20 0800)  Resp: 18 (07/15/20 0755)  BP: (!) 167/83 (07/15/20 0800)  SpO2: 100 % (07/15/20 0747)    Vital Signs Range (Last 24H):  Temp:  [97.4 °F (36.3 °C)-98.4 °F (36.9 °C)]   Pulse:  [56-92]   Resp:  [16-20]   BP: (136-167)/(52-83)   SpO2:  [98 %-100 %]     No intake/output data recorded.    Physical Exam:   General: Awake and alert.  HEENT: No scleral icterus, MM moist.  NECK: No JVD. No carotid bruits.  CV: Iregular rate and rhthym with murmur  PULM: Clear without crackles, rhonchi, wheezes.  ABD: Soft, nl BS, NT, ND.  EXTR: No edema, clubbing, cyanosis.   NEURO: Non focal and grossly intact.  SKIN: Wd left thigh wd vac in place sanguinous drainage, uninterrupted   MS: No joint effusions or erythema.   PSYCH: Normal Mood.  Right arm fistula positive thrill and bruit       Laboratory:  Recent Labs   Lab 07/14/20  0552   HGB 8.7*   HCT 28.6*   WBC 6.10          Recent Labs   Lab 07/12/20  0610 07/13/20  0501 07/14/20  0552   * 134* 134*   K 5.0 5.4* 4.8    CL 95 97 96   CO2 25 23 23   BUN 29* 41* 29*   CREATININE 6.4* 7.7* 5.7*   GLU 56* 55* 141*   CALCIUM 8.7 8.4* 8.2*   PHOS 5.3* 6.4* 6.0*       Lab Results   Component Value Date    PTH 75.6 07/09/2020    CALCIUM 8.2 (L) 07/14/2020    CAION 0.84 (L) 07/26/2017    PHOS 6.0 (H) 07/14/2020       Lab Results   Component Value Date    URICACID 4.0 03/17/2020       BNP  No results for input(s): BNP in the last 168 hours.    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 [E83.59, L97.922]  Yes    Malnutrition of moderate degree [E44.0]  Yes     Chronic    H/O mitral valve replacement [Z95.2]  Not Applicable     Chronic    Anemia [D64.9]  Yes     Chronic    Chronic pain on chronic narcotics [R52]  Yes     Chronic    Peripheral vascular disease now with left lower extremity occlusion [I70.90]  Yes    Chronic systolic heart failure [I50.22]  Yes    Left atrial thrombus [I51.3]  Yes    Chronic respiratory failure [J96.10]  Yes     Chronic     3 L O2 at home      Pulmonary hypertension [I27.20]  Yes     Chronic    Other persistent atrial fibrillation [I48.19]  Yes    ESRD (end stage renal disease) on HD M,W, F [N18.6]  Yes     Chronic    Tobacco dependence [F17.200]  Yes     Chronic      Resolved Hospital Problems   No resolved problems to display.       Nephrology Assessment/Plan:    ESRD  -continue MWF schedule  -Renal dose all meds  -Low Phos/potassium diet  -Labs pre dialysis  -cleared renal standpoint for dc     Calciphylaxis:  -phosphorus controlled with binders, continue as ordered with meals  -sodium thiosulfate with dialysis  -aggressive wound care  -Education on disease process, prevention, complications     Left thigh wound:  -s/p I&D with wd vac  -I have rediscussed the need for long-term wound care and LTAC  -Continue sodium thiosulfate post dialysis which she has been receiving  outpatient     Hyperkalemia:  -using a low K dialysate  -monitor serum potassium     Anemia:  -DONNY's with dialysis  -no symptoms of bleeding  -no thrombocytopenia     Secondary hyperparathyroidism:  -hypocalcemia corrects with hypoalbuminemia  -above goal range 3-6, binders with meals   -Diet education and compliance addressed  -monitor calcium and phosphorus; last PTH okay     Hypertension:  -volume good  -variable control but acceptable  -control pain  -continue current meds       RL Chung/Joseph Ingram M.D.

## 2020-07-16 ENCOUNTER — TELEPHONE (OUTPATIENT)
Dept: FAMILY MEDICINE | Facility: CLINIC | Age: 75
End: 2020-07-16

## 2020-07-16 NOTE — ASSESSMENT & PLAN NOTE
Nicotine dependence, continues to smoke despite smoking cessation counseling.  Not interested in smoking cessation.

## 2020-07-16 NOTE — ASSESSMENT & PLAN NOTE
Severe calciphylaxis with chronic wounds, most prominent left inner thigh  Was seen from outpatient wound care with concern for infection, less likely antibiotics discontinued  Now status post debridement 7/13 OR by Dr. Elam with wound VAC applied  Patient has declined LTAC placement, high risk for rehospitalization, only wishes to return home with home health  Discharge home with wound VAC, to be changed twice weekly, follow-up outpatient general surgery, home health with wound care

## 2020-07-16 NOTE — ASSESSMENT & PLAN NOTE
Persistent atrial fibrillation followed by Dr. Powell-has appointment on 07/16  To resume Coumadin with INR checked on 07/16 by [facility]

## 2020-07-16 NOTE — DISCHARGE SUMMARY
[facility] Medicine  Discharge Summary      Patient Name: Griselda Neely  MRN: [id number]  Admission Date: 7/7/2020  Hospital Length of Stay: 7 days  Discharge Date and Time: 7/15/2020  3:38 PM  Attending Physician: No att. providers found   Discharging Provider: Lissa Portillo MD  Primary Care Provider: Kalie Neely MD      HPI:   Griselda Neely is a 74 y.o. Black or  female who  has a past medical history of Anemia, Calciphylaxis (07/2017), CHF (congestive heart failure), Encounter for blood transfusion (03/2016), Gout, Hypertension, Mitral valve regurgitation, Osteoarthritis, Pancreatitis, Peripheral vascular disease, Peritoneal dialysis catheter in place, Pneumonia (09/09/2017), and Renal failure.. The patient presented to [facility] on 7/7/2020 with a primary complaint of Wound Infection (sent by wound care for wound on left leg)    History was obtained from the patient and the family present at the bedside and ER physician Sign-out. Patient is referred to the ED by wound care for wound to left medial thigh. The patient reports she has been treated outpatient with antibiotics and she has been having worsening pain to her left leg. She was seen by wound care today and was instructed to come to the ED for further evaluation. She has a history of noncompliance and refuses to have U/S of legs in the ED.  The patient has a history of ESRD on HD. She had her last HD on yesterday. She denies fever, chills, chest pain, sob, vomiting, abdominal pain, diarrhea, dizziness or LOC.      In the ED, VSS. CBC shows anemia that is chronic. H/H stable at baseline. She denies black or bloody stools. CMP was unremarkable.  EKG shows A-fib with controlled rate. No new signs of ischemia. CXR shows cardiomegaly and mild pulmonary edema with trace bilateral pleural effusions. Blood cultures were drawn and the patient was started on IV antibiotics.      Decision to  admit was taken and patient was informed about the plan of care.     Procedure(s) (LRB):  DEBRIDEMENT, WOUND (Left)      Hospital Course:   I, Dr Portillo assumed care of this patient on 7/14/20. 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  Known history of significant peripheral vascular disease with significant vascular calcification.  Patient on sodium thiosulfate with dialysis.  Chronic pain due to calciphylaxis.  She was admitted and empirically started on broad-spectrum antibiotics however lower suspicion of infection and subsequently antibiotics discontinued.  General surgery was consulted for further wound care management including debridement.  Coumadin was held.  On 07/13 underwent debridement with wound VAC placement by Dr. Elam.  Nephrology following for dialysis in the setting of ESRD.  Patient adamantly declining LTAC placement, son visiting from out of town, patient requesting discharge home with home health.  On 07/15 seen by surgery, wound VAC in place, recommendations to be discharged with wound VAC in place, needs to be changed twice weekly.  Patient and son were counseled about discharge plan including medication changes, Coumadin restarted, need for repeat INR, short course of narcotics were prescribed, Norco and tramadol-patient on this medication chronically, advice to follow-up with providers for refills, follow-up as well as return precautions.  All questions and concerns addressed.  Discussed with wound care on day of discharge.  Communicated with general surgeon on day of discharge.    Discharge examination  Chronically ill female patient sitting in bed no apparent distress, cooperative  On supplemental oxygen via nasal cannula  Abdomen soft and nontender  Alert and  oriented x3  Wound VAC to the left medial thigh in place     Consults:   Consults (From admission, onward)        Status Ordering Provider     Inpatient consult to General Surgery  Once     Provider:  Susan Stroud MD    Completed JOSHUA CORBIN     Inpatient consult to Nephrology  Once     Provider:  Joseph Ingram MD    Completed SKYE DOBBS     Inpatient consult to   Once     Provider:  (Not yet assigned)    Completed EVELYN NAVARRO     Inpatient consult to Social Work/Case Management  Once     Provider:  (Not yet assigned)    Completed EVELYN NAVARRO          * 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 Elam with wound VAC applied  Patient has declined LTAC placement, high risk for rehospitalization, only wishes to return home with home health  Discharge home with wound VAC, to be changed twice weekly, follow-up outpatient general surgery, home health with wound care    Peripheral vascular disease now with left lower extremity occlusion  No significant peripheral vascular disease, not amendable to intervention, significant calcifications      Other persistent atrial fibrillation  Persistent atrial fibrillation followed by Dr. Powell-has appointment on 07/16  To resume Coumadin with INR checked on 07/16 by home health      Malnutrition of moderate degree  BMI of 15 with muscle wasting with chronic medical condition.      Chronic pain on chronic narcotics  Cautious use as patient has had increased somnolence with additional doses of narcotics.  Patient given short course of pain medication given wound VAC and need for ongoing wound care.    Anemia  Anemia chronic disease.  Continue DONNY with dialysis.     H/O  mitral valve replacement  Patient with a history of mitral valve replacement March 2019      Chronic systolic heart failure  Reduced EF on recent echo.  Dialysis dependent.      Chronic respiratory failure  Patient on 3-4 chronic home oxygen.      Pulmonary hypertension  Last echocardiogram with PASP 50      ESRD (end stage renal disease) on HD M,W, F  Known ESRD on HD MWF.  Patient is followed by Dr. Ingram.  Complicated by hyperphosphatemia, secondary hyperparathyroidism, anemia.  Continue HD as per nephrology.  Renally dosing all medications.      Tobacco dependence  Nicotine dependence, continues to smoke despite smoking cessation counseling.  Not interested in smoking cessation.        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 [E83.59, L97.922] 07/10/2020 Yes    Peripheral vascular disease now with left lower extremity occlusion [I70.90] 03/14/2020 Yes    Other persistent atrial fibrillation [I48.19] 07/18/2017 Yes    Malnutrition of moderate degree [E44.0] 07/14/2020 Yes     Chronic    H/O mitral valve replacement [Z95.2] 03/17/2020 Not Applicable     Chronic    Anemia [D64.9] 03/17/2020 Yes     Chronic    Chronic pain on chronic narcotics [R52] 03/17/2020 Yes     Chronic    Chronic systolic heart failure [I50.22] 02/03/2020 Yes    Chronic respiratory failure [J96.10] 05/13/2019 Yes     Chronic    Pulmonary hypertension [I27.20] 07/25/2017 Yes     Chronic    ESRD (end stage renal disease) on HD M,W, F [N18.6] 05/11/2016 Yes     Chronic    Tobacco dependence [F17.200] 09/09/2013 Yes     Chronic      Problems Resolved During this Admission:    Diagnosis Date Noted Date Resolved POA    Left atrial thrombus [I51.3] 01/03/2020 07/15/2020 Yes       Discharged Condition: fair    Disposition: Home-Health Care c    Follow Up:   Contact information for follow-up providers     Carlos RODRIGUEZ  Papa TERRY MD. Schedule an appointment as soon as possible for a visit in 2 weeks.    Specialties: General Surgery, Surgery  Why: wound follow up  Contact information:  [address]  [address]  [place] [address]  [phone]             Katharina Powell MD On 7/16/2020.    Specialties: Cardiovascular Disease, Cardiology, INTERVENTIONAL CARDIOLOGY  Contact information:  [address]  [address]  [place] [address]  [phone]             Kalie Neely MD. Schedule an appointment as soon as possible for a visit in 2 weeks.    Specialty: Family Medicine  Why: post hospital follow up  Contact information:  [address]  [address]  [place] [address]  [phone]                   Contact information for after-discharge care     Home Medical Care     [facility] .    Service: Home Health Services  Contact information:  [address] [place] [address] [id number] [phone]                           Patient Instructions:      Protime-INR   Standing Status: Future Standing Exp. Date: 09/13/21   Order Comments: Please check INR on 7/16/20 and send results to Dr Powell     Diet renal     Notify your health care provider if you experience any of the following:  temperature >100.4     Notify your health care provider if you experience any of the following:  redness, tenderness, or signs of infection (pain, swelling, redness, odor or green/yellow discharge around incision site)     Change dressing (specify)   Order Comments: Wound VAC to left leg to be changed twice weekly     Activity as tolerated       Significant Diagnostic Studies: Labs:   BMP:   Recent Labs   Lab 07/14/20  0552   *   *   K 4.8   CL 96   CO2 23   BUN 29*   CREATININE 5.7*   CALCIUM 8.2*   , CMP   Recent Labs   Lab 07/14/20  0552   *   K 4.8   CL 96   CO2 23   *   BUN 29*   CREATININE 5.7*   CALCIUM 8.2*   ALBUMIN 2.6*   ANIONGAP 15   ESTGFRAFRICA 7.9*   EGFRNONAA 6.8*   , CBC   Recent  Labs   Lab 07/14/20  0552   WBC 6.10   HGB 8.7*   HCT 28.6*      , INR   Lab Results   Component Value Date    INR 1.2 07/14/2020    INR 1.5 07/13/2020    INR 1.7 07/12/2020   , Lipid Panel   Lab Results   Component Value Date    CHOL 155 10/19/2019    HDL 56 10/19/2019    LDLCALC 82.6 10/19/2019    TRIG 82 10/19/2019    CHOLHDL 36.1 10/19/2019   , Troponin No results for input(s): TROPONINI in the last 168 hours. and A1C: No results for input(s): HGBA1C in the last 4320 hours.  X-ray Chest Ap Portable    Result Date: 7/7/2020  CLINICAL HISTORY: 74 years (1945) Female Chronic kidney disease Left leg pain/ wound medial left thigh area / hx of mi, chf, pvd, pneumonia TECHNIQUE: Portable AP radiograph the chest. COMPARISON: Most recent radiograph from June 14, 2020 FINDINGS: There is vascular congestion with increased interstitial markings findings indicating mild pulmonary edema improved from the previous exam.  There is blunting of both costophrenic angles consistent with trace pleural effusions and adjacent atelectasis. No pneumothorax is identified. The cardiac silhouette is markedly enlarged, with a metallic annular aortic valve replacement. Atheromatous calcifications are seen at the aortic arch. Osseous structures show degenerative changes in the spine. The visualized upper abdomen is unremarkable. IMPRESSION: Cardiomegaly and findings of mild pulmonary edema with trace bilateral pleural effusions. . Electronically Signed by VALENTINA Rodriguez on 7/7/2020 11:20 AM    Pending Diagnostic Studies:     None         Medications:  Reconciled Home Medications:      Medication List      CHANGE how you take these medications    oxyCODONE-acetaminophen 5-325 mg per tablet  Commonly known as: PERCOCET  Take 1 tablet by mouth every 8 (eight) hours as needed for Pain (on dialysis days).  What changed:   · how much to take  · when to take this        CONTINUE taking these medications    aspirin 81 MG EC  tablet  Commonly known as: ECOTRIN  Take 81 mg by mouth once daily.     calcium acetate(phosphat bind) 667 mg capsule  Commonly known as: PHOSLO  Take 667 mg by mouth once daily.     diltiaZEM 180 MG 24 hr capsule  Commonly known as: CARDIZEM CD  Take 180 mg by mouth once daily.     isosorbide mononitrate 30 MG 24 hr tablet  Commonly known as: IMDUR  Take 30 mg by mouth once daily.     losartan 25 MG tablet  Commonly known as: COZAAR  Take 25 mg by mouth once daily.     magnesium oxide 400 mg (241.3 mg magnesium) tablet  Commonly known as: MAG-OX  Take 400 mg by mouth once daily.     metoprolol tartrate 25 MG tablet  Commonly known as: LOPRESSOR  Take 25 mg by mouth 2 (two) times daily.     pantoprazole 40 MG tablet  Commonly known as: PROTONIX  Take 40 mg by mouth 2 (two) times daily.     JENNIFER-KODAK ORAL  Take 1 tablet by mouth once daily.     traMADoL 50 mg tablet  Commonly known as: ULTRAM  Take 1 tablet (50 mg total) by mouth every 8 (eight) hours as needed for Pain.     warfarin 3 MG tablet  Commonly known as: COUMADIN  Take 3 mg by mouth every evening.     zinc sulfate 220 mg Tab tablet  Take 220 mg by mouth every other day.        STOP taking these medications    clindamycin 300 MG capsule  Commonly known as: CLEOCIN     mupirocin 2 % ointment  Commonly known as: BACTROBAN            Indwelling Lines/Drains at time of discharge:   Lines/Drains/Airways     Drain                 Hemodialysis AV Fistula 08/08/19 0214 Right upper arm 342 days                Time spent on the discharge of patient: 36 minutes  Patient was seen and examined on the date of discharge and determined to be suitable for discharge.         Lissa Portillo MD  Department of Hospital Medicine  [facility]

## 2020-07-20 ENCOUNTER — EXTERNAL HOME HEALTH (OUTPATIENT)
Dept: HOME HEALTH SERVICES | Facility: HOSPITAL | Age: 75
End: 2020-07-20

## 2020-07-20 NOTE — PROGRESS NOTES
SOC Order # [id number]  Cert Period: 04/27 to 06/25/2020 with [facility] - Dr. Kalie Neely

## 2020-07-21 ENCOUNTER — HOSPITAL ENCOUNTER (OUTPATIENT)
Dept: PREADMISSION TESTING | Facility: HOSPITAL | Age: 75
Discharge: HOME | End: 2020-07-21
Attending: INTERNAL MEDICINE
Payer: MEDICARE

## 2020-07-21 DIAGNOSIS — Z01.810 PRE-PROCEDURAL CARDIOVASCULAR EXAMINATION: Primary | ICD-10-CM

## 2020-07-21 LAB
ALBUMIN SERPL BCP-MCNC: 3.1 G/DL (ref 3.5–5.2)
ALP SERPL-CCNC: 191 U/L (ref 55–135)
ALT SERPL W/O P-5'-P-CCNC: 26 U/L (ref 10–44)
ANION GAP SERPL CALC-SCNC: 14 MMOL/L (ref 8–16)
APTT PPP: 49 SEC (ref 23.6–33.3)
AST SERPL-CCNC: 76 U/L (ref 10–40)
BILIRUB SERPL-MCNC: 0.7 MG/DL (ref 0.1–1)
BUN SERPL-MCNC: 40 MG/DL (ref 8–23)
CALCIUM SERPL-MCNC: 9.3 MG/DL (ref 8.7–10.5)
CHLORIDE SERPL-SCNC: 95 MMOL/L (ref 95–110)
CO2 SERPL-SCNC: 27 MMOL/L (ref 23–29)
CREAT SERPL-MCNC: 5.5 MG/DL (ref 0.5–1.4)
ERYTHROCYTE [DISTWIDTH] IN BLOOD BY AUTOMATED COUNT: 18.7 % (ref 11.5–14.5)
EST. GFR  (AFRICAN AMERICAN): 8.2 ML/MIN/1.73 M^2
EST. GFR  (NON AFRICAN AMERICAN): 7.1 ML/MIN/1.73 M^2
GLUCOSE SERPL-MCNC: 94 MG/DL (ref 70–110)
HCT VFR BLD AUTO: 32 % (ref 37–48.5)
HGB BLD-MCNC: 9.5 G/DL (ref 12–16)
INR PPP: 1.8
MCH RBC QN AUTO: 27 PG (ref 27–31)
MCHC RBC AUTO-ENTMCNC: 29.7 G/DL (ref 32–36)
MCV RBC AUTO: 91 FL (ref 82–98)
PLATELET # BLD AUTO: 350 K/UL (ref 150–350)
PMV BLD AUTO: 10.5 FL (ref 9.2–12.9)
POTASSIUM SERPL-SCNC: 5.3 MMOL/L (ref 3.5–5.1)
PROT SERPL-MCNC: 8.2 G/DL (ref 6–8.4)
PROTHROMBIN TIME: 19.7 SEC (ref 10.6–14.8)
RBC # BLD AUTO: 3.52 M/UL (ref 4–5.4)
SODIUM SERPL-SCNC: 136 MMOL/L (ref 136–145)
WBC # BLD AUTO: 7.03 K/UL (ref 3.9–12.7)

## 2020-07-21 PROCEDURE — 36415 COLL VENOUS BLD VENIPUNCTURE: CPT

## 2020-07-21 PROCEDURE — 85730 THROMBOPLASTIN TIME PARTIAL: CPT

## 2020-07-21 PROCEDURE — 85610 PROTHROMBIN TIME: CPT

## 2020-07-21 PROCEDURE — 80053 COMPREHEN METABOLIC PANEL: CPT

## 2020-07-21 PROCEDURE — U0003 INFECTIOUS AGENT DETECTION BY NUCLEIC ACID (DNA OR RNA); SEVERE ACUTE RESPIRATORY SYNDROME CORONAVIRUS 2 (SARS-COV-2) (CORONAVIRUS DISEASE [COVID-19]), AMPLIFIED PROBE TECHNIQUE, MAKING USE OF HIGH THROUGHPUT TECHNOLOGIES AS DESCRIBED BY CMS-2020-01-R: HCPCS

## 2020-07-21 PROCEDURE — 85027 COMPLETE CBC AUTOMATED: CPT

## 2020-07-21 NOTE — DISCHARGE INSTRUCTIONS
 Nothing to eat or drink after midnight the night before your procedure.   Do not take any medications the morning of your procedure   Bring all your medications with you in the original pill bottles from pharmacy.   Make arrangements for someone you know to drive you home after your procedure. Taxi and Uber are not acceptable.

## 2020-07-22 ENCOUNTER — HOSPITAL ENCOUNTER (OUTPATIENT)
Facility: HOSPITAL | Age: 75
Discharge: HOME HEALTH | End: 2020-07-23
Attending: EMERGENCY MEDICINE | Admitting: INTERNAL MEDICINE
Payer: MEDICARE

## 2020-07-22 DIAGNOSIS — I51.3 LA THROMBUS: ICD-10-CM

## 2020-07-22 DIAGNOSIS — I34.0 MITRAL REGURGITATION: ICD-10-CM

## 2020-07-22 DIAGNOSIS — L97.929 ULCER OF LEFT LOWER EXTREMITY, UNSPECIFIED ULCER STAGE: ICD-10-CM

## 2020-07-22 DIAGNOSIS — I50.9 OTHER CONGESTIVE HEART FAILURE: ICD-10-CM

## 2020-07-22 DIAGNOSIS — Z51.89 VISIT FOR WOUND CHECK: Primary | ICD-10-CM

## 2020-07-22 DIAGNOSIS — I34.0 NONRHEUMATIC MITRAL VALVE REGURGITATION: ICD-10-CM

## 2020-07-22 LAB
ALBUMIN SERPL BCP-MCNC: 3.2 G/DL (ref 3.5–5.2)
ALP SERPL-CCNC: 175 U/L (ref 55–135)
ALT SERPL W/O P-5'-P-CCNC: 18 U/L (ref 10–44)
ANION GAP SERPL CALC-SCNC: 13 MMOL/L (ref 8–16)
AST SERPL-CCNC: 31 U/L (ref 10–40)
BASOPHILS # BLD AUTO: 0.03 K/UL (ref 0–0.2)
BASOPHILS NFR BLD: 0.3 % (ref 0–1.9)
BILIRUB SERPL-MCNC: 0.6 MG/DL (ref 0.1–1)
BUN SERPL-MCNC: 19 MG/DL (ref 8–23)
CALCIUM SERPL-MCNC: 9.7 MG/DL (ref 8.7–10.5)
CHLORIDE SERPL-SCNC: 94 MMOL/L (ref 95–110)
CO2 SERPL-SCNC: 29 MMOL/L (ref 23–29)
CREAT SERPL-MCNC: 3.9 MG/DL (ref 0.5–1.4)
DIFFERENTIAL METHOD: ABNORMAL
EOSINOPHIL # BLD AUTO: 0.2 K/UL (ref 0–0.5)
EOSINOPHIL NFR BLD: 1.5 % (ref 0–8)
ERYTHROCYTE [DISTWIDTH] IN BLOOD BY AUTOMATED COUNT: 18.5 % (ref 11.5–14.5)
EST. GFR  (AFRICAN AMERICAN): 12.5 ML/MIN/1.73 M^2
EST. GFR  (NON AFRICAN AMERICAN): 10.8 ML/MIN/1.73 M^2
GLUCOSE SERPL-MCNC: 75 MG/DL (ref 70–110)
HCT VFR BLD AUTO: 34.3 % (ref 37–48.5)
HGB BLD-MCNC: 10.2 G/DL (ref 12–16)
IMM GRANULOCYTES # BLD AUTO: 0.04 K/UL (ref 0–0.04)
IMM GRANULOCYTES NFR BLD AUTO: 0.4 % (ref 0–0.5)
INR PPP: 1.9
LYMPHOCYTES # BLD AUTO: 1 K/UL (ref 1–4.8)
LYMPHOCYTES NFR BLD: 9.1 % (ref 18–48)
MAGNESIUM SERPL-MCNC: 2.1 MG/DL (ref 1.6–2.6)
MCH RBC QN AUTO: 27 PG (ref 27–31)
MCHC RBC AUTO-ENTMCNC: 29.7 G/DL (ref 32–36)
MCV RBC AUTO: 91 FL (ref 82–98)
MONOCYTES # BLD AUTO: 1.1 K/UL (ref 0.3–1)
MONOCYTES NFR BLD: 9.8 % (ref 4–15)
NEUTROPHILS # BLD AUTO: 8.5 K/UL (ref 1.8–7.7)
NEUTROPHILS NFR BLD: 78.9 % (ref 38–73)
NRBC BLD-RTO: 0 /100 WBC
PHOSPHATE SERPL-MCNC: 2.3 MG/DL (ref 2.7–4.5)
PLATELET # BLD AUTO: 316 K/UL (ref 150–350)
PMV BLD AUTO: 10.6 FL (ref 9.2–12.9)
POTASSIUM SERPL-SCNC: 4.8 MMOL/L (ref 3.5–5.1)
PROT SERPL-MCNC: 8.7 G/DL (ref 6–8.4)
PROTHROMBIN TIME: 21 SEC (ref 10.6–14.8)
RBC # BLD AUTO: 3.78 M/UL (ref 4–5.4)
SARS-COV-2 RDRP RESP QL NAA+PROBE: NEGATIVE
SODIUM SERPL-SCNC: 136 MMOL/L (ref 136–145)
WBC # BLD AUTO: 10.79 K/UL (ref 3.9–12.7)

## 2020-07-22 PROCEDURE — 25000003 PHARM REV CODE 250: Performed by: STUDENT IN AN ORGANIZED HEALTH CARE EDUCATION/TRAINING PROGRAM

## 2020-07-22 PROCEDURE — 85025 COMPLETE CBC W/AUTO DIFF WBC: CPT

## 2020-07-22 PROCEDURE — 99285 EMERGENCY DEPT VISIT HI MDM: CPT | Mod: 25

## 2020-07-22 PROCEDURE — 80053 COMPREHEN METABOLIC PANEL: CPT

## 2020-07-22 PROCEDURE — 83735 ASSAY OF MAGNESIUM: CPT

## 2020-07-22 PROCEDURE — 96374 THER/PROPH/DIAG INJ IV PUSH: CPT

## 2020-07-22 PROCEDURE — 63600175 PHARM REV CODE 636 W HCPCS: Performed by: STUDENT IN AN ORGANIZED HEALTH CARE EDUCATION/TRAINING PROGRAM

## 2020-07-22 PROCEDURE — 93005 ELECTROCARDIOGRAM TRACING: CPT | Performed by: INTERNAL MEDICINE

## 2020-07-22 PROCEDURE — G0378 HOSPITAL OBSERVATION PER HR: HCPCS

## 2020-07-22 PROCEDURE — 85610 PROTHROMBIN TIME: CPT

## 2020-07-22 PROCEDURE — 84100 ASSAY OF PHOSPHORUS: CPT

## 2020-07-22 PROCEDURE — U0002 COVID-19 LAB TEST NON-CDC: HCPCS

## 2020-07-22 RX ORDER — ONDANSETRON 2 MG/ML
4 INJECTION INTRAMUSCULAR; INTRAVENOUS EVERY 6 HOURS PRN
Status: DISCONTINUED | OUTPATIENT
Start: 2020-07-22 | End: 2020-07-23 | Stop reason: HOSPADM

## 2020-07-22 RX ORDER — LANOLIN ALCOHOL/MO/W.PET/CERES
400 CREAM (GRAM) TOPICAL DAILY
Status: DISCONTINUED | OUTPATIENT
Start: 2020-07-23 | End: 2020-07-23 | Stop reason: HOSPADM

## 2020-07-22 RX ORDER — METOPROLOL TARTRATE 25 MG/1
25 TABLET, FILM COATED ORAL ONCE
Status: COMPLETED | OUTPATIENT
Start: 2020-07-22 | End: 2020-07-22

## 2020-07-22 RX ORDER — ISOSORBIDE MONONITRATE 30 MG/1
30 TABLET, EXTENDED RELEASE ORAL ONCE
Status: DISCONTINUED | OUTPATIENT
Start: 2020-07-22 | End: 2020-07-22

## 2020-07-22 RX ORDER — HYDROMORPHONE HYDROCHLORIDE 1 MG/ML
1 INJECTION, SOLUTION INTRAMUSCULAR; INTRAVENOUS; SUBCUTANEOUS
Status: COMPLETED | OUTPATIENT
Start: 2020-07-22 | End: 2020-07-22

## 2020-07-22 RX ORDER — LOSARTAN POTASSIUM 25 MG/1
25 TABLET ORAL DAILY
Status: DISCONTINUED | OUTPATIENT
Start: 2020-07-23 | End: 2020-07-22

## 2020-07-22 RX ORDER — ISOSORBIDE MONONITRATE 30 MG/1
30 TABLET, EXTENDED RELEASE ORAL DAILY
Status: DISCONTINUED | OUTPATIENT
Start: 2020-07-23 | End: 2020-07-23 | Stop reason: HOSPADM

## 2020-07-22 RX ORDER — AMOXICILLIN 250 MG
1 CAPSULE ORAL 2 TIMES DAILY PRN
Status: DISCONTINUED | OUTPATIENT
Start: 2020-07-22 | End: 2020-07-23 | Stop reason: HOSPADM

## 2020-07-22 RX ORDER — CALCIUM ACETATE 667 MG/1
667 CAPSULE ORAL DAILY
Status: DISCONTINUED | OUTPATIENT
Start: 2020-07-23 | End: 2020-07-22

## 2020-07-22 RX ORDER — ISOSORBIDE MONONITRATE 30 MG/1
30 TABLET, EXTENDED RELEASE ORAL DAILY
Status: DISCONTINUED | OUTPATIENT
Start: 2020-07-23 | End: 2020-07-22

## 2020-07-22 RX ORDER — TRAMADOL HYDROCHLORIDE 50 MG/1
50 TABLET ORAL EVERY 8 HOURS PRN
Status: DISCONTINUED | OUTPATIENT
Start: 2020-07-22 | End: 2020-07-23 | Stop reason: HOSPADM

## 2020-07-22 RX ORDER — PANTOPRAZOLE SODIUM 40 MG/1
40 TABLET, DELAYED RELEASE ORAL 2 TIMES DAILY
Status: DISCONTINUED | OUTPATIENT
Start: 2020-07-22 | End: 2020-07-23 | Stop reason: HOSPADM

## 2020-07-22 RX ORDER — OXYCODONE AND ACETAMINOPHEN 5; 325 MG/1; MG/1
1 TABLET ORAL EVERY 8 HOURS PRN
Status: DISCONTINUED | OUTPATIENT
Start: 2020-07-22 | End: 2020-07-23 | Stop reason: HOSPADM

## 2020-07-22 RX ORDER — ASPIRIN 81 MG/1
81 TABLET ORAL DAILY
Status: DISCONTINUED | OUTPATIENT
Start: 2020-07-23 | End: 2020-07-22

## 2020-07-22 RX ORDER — LORAZEPAM 2 MG/ML
1 INJECTION INTRAMUSCULAR ONCE
Status: DISCONTINUED | OUTPATIENT
Start: 2020-07-22 | End: 2020-07-22

## 2020-07-22 RX ORDER — METOPROLOL TARTRATE 25 MG/1
25 TABLET, FILM COATED ORAL 2 TIMES DAILY
Status: DISCONTINUED | OUTPATIENT
Start: 2020-07-23 | End: 2020-07-23 | Stop reason: HOSPADM

## 2020-07-22 RX ORDER — LANOLIN ALCOHOL/MO/W.PET/CERES
400 CREAM (GRAM) TOPICAL ONCE
Status: DISCONTINUED | OUTPATIENT
Start: 2020-07-22 | End: 2020-07-22

## 2020-07-22 RX ORDER — CALCIUM ACETATE 667 MG/1
667 CAPSULE ORAL DAILY
Status: DISCONTINUED | OUTPATIENT
Start: 2020-07-23 | End: 2020-07-23 | Stop reason: HOSPADM

## 2020-07-22 RX ORDER — WARFARIN SODIUM 5 MG/1
5 TABLET ORAL ONCE
Status: DISCONTINUED | OUTPATIENT
Start: 2020-07-23 | End: 2020-07-23 | Stop reason: HOSPADM

## 2020-07-22 RX ORDER — LOSARTAN POTASSIUM 25 MG/1
25 TABLET ORAL ONCE
Status: DISCONTINUED | OUTPATIENT
Start: 2020-07-22 | End: 2020-07-22

## 2020-07-22 RX ORDER — WARFARIN SODIUM 5 MG/1
5 TABLET ORAL ONCE
Status: COMPLETED | OUTPATIENT
Start: 2020-07-22 | End: 2020-07-22

## 2020-07-22 RX ORDER — ZINC SULFATE 50(220)MG
220 CAPSULE ORAL DAILY
Status: DISCONTINUED | OUTPATIENT
Start: 2020-07-23 | End: 2020-07-23 | Stop reason: HOSPADM

## 2020-07-22 RX ORDER — LOSARTAN POTASSIUM 25 MG/1
25 TABLET ORAL DAILY
Status: DISCONTINUED | OUTPATIENT
Start: 2020-07-23 | End: 2020-07-23 | Stop reason: HOSPADM

## 2020-07-22 RX ORDER — DILTIAZEM HYDROCHLORIDE 180 MG/1
180 CAPSULE, COATED, EXTENDED RELEASE ORAL ONCE
Status: DISCONTINUED | OUTPATIENT
Start: 2020-07-22 | End: 2020-07-22

## 2020-07-22 RX ORDER — SODIUM CHLORIDE 0.9 % (FLUSH) 0.9 %
10 SYRINGE (ML) INJECTION
Status: DISCONTINUED | OUTPATIENT
Start: 2020-07-22 | End: 2020-07-23 | Stop reason: HOSPADM

## 2020-07-22 RX ORDER — ACETAMINOPHEN 325 MG/1
650 TABLET ORAL EVERY 4 HOURS PRN
Status: DISCONTINUED | OUTPATIENT
Start: 2020-07-22 | End: 2020-07-23 | Stop reason: HOSPADM

## 2020-07-22 RX ORDER — CALCIUM ACETATE 667 MG/1
667 CAPSULE ORAL ONCE
Status: DISCONTINUED | OUTPATIENT
Start: 2020-07-22 | End: 2020-07-22

## 2020-07-22 RX ORDER — DILTIAZEM HYDROCHLORIDE 180 MG/1
180 CAPSULE, COATED, EXTENDED RELEASE ORAL DAILY
Status: DISCONTINUED | OUTPATIENT
Start: 2020-07-23 | End: 2020-07-22

## 2020-07-22 RX ORDER — WARFARIN SODIUM 5 MG/1
5 TABLET ORAL ONCE
Status: DISCONTINUED | OUTPATIENT
Start: 2020-07-22 | End: 2020-07-22

## 2020-07-22 RX ORDER — DILTIAZEM HYDROCHLORIDE 180 MG/1
180 CAPSULE, COATED, EXTENDED RELEASE ORAL DAILY
Status: DISCONTINUED | OUTPATIENT
Start: 2020-07-23 | End: 2020-07-23 | Stop reason: HOSPADM

## 2020-07-22 RX ORDER — LANOLIN ALCOHOL/MO/W.PET/CERES
400 CREAM (GRAM) TOPICAL DAILY
Status: DISCONTINUED | OUTPATIENT
Start: 2020-07-23 | End: 2020-07-22

## 2020-07-22 RX ORDER — ASPIRIN 81 MG/1
81 TABLET ORAL ONCE
Status: DISCONTINUED | OUTPATIENT
Start: 2020-07-22 | End: 2020-07-22

## 2020-07-22 RX ORDER — ASPIRIN 81 MG/1
81 TABLET ORAL DAILY
Status: DISCONTINUED | OUTPATIENT
Start: 2020-07-23 | End: 2020-07-23 | Stop reason: HOSPADM

## 2020-07-22 RX ORDER — METOPROLOL TARTRATE 25 MG/1
25 TABLET, FILM COATED ORAL 2 TIMES DAILY
Status: DISCONTINUED | OUTPATIENT
Start: 2020-07-22 | End: 2020-07-22

## 2020-07-22 RX ADMIN — WARFARIN SODIUM 5 MG: 5 TABLET ORAL at 09:07

## 2020-07-22 RX ADMIN — HYDROMORPHONE HYDROCHLORIDE 1 MG: 1 INJECTION, SOLUTION INTRAMUSCULAR; INTRAVENOUS; SUBCUTANEOUS at 08:07

## 2020-07-22 RX ADMIN — METOPROLOL TARTRATE 25 MG: 25 TABLET ORAL at 09:07

## 2020-07-22 NOTE — ED PROVIDER NOTES
Encounter Date: 7/22/2020       History     Chief Complaint   Patient presents with    Wound Check     Referred here for removal of adhered wound vac dressing, home health unable to remove, L leg     74-year-old found male with end-stage renal disease on HD, last dialysis today, AFib on Coumadin, recent admission July 7-15 s for infected left lower extremity wound s/p surgical debridement, status post wound VAC placement, sent to ED by wound care as they were unable to remove the sponge from her wound VAC.  She refused her wound VAC change on Monday, and is now adhered to her skin, and she is in too much pain to have removed been taking Percocet and tramadol, but this had not helped.  Her sister provided us a history, says has been afebrile and otherwise healthy.        Review of patient's allergies indicates:  No Known Allergies  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    both legs    CHF (congestive heart failure)     Encounter for blood transfusion 03/2016    Gout     Hemodialysis access, AV graft     Hypertension     Mitral valve regurgitation     Osteoarthritis     Pancreatitis     Peripheral vascular disease     Pneumonia 09/09/2017    Renal failure      Past Surgical History:   Procedure Laterality Date    ACHILLES TENDON SURGERY Right     ANGIOGRAPHY OF ARTERIOVENOUS SHUNT Right 1/7/2020    Procedure: Fistulogram with Possible Intervention;  Surgeon: Joseph Loyola MD;  Location: [facility] CATH/EP LAB;  Service: Cardiology;  Laterality: Right;    ANGIOGRAPHY OF LOWER EXTREMITY Left 3/18/2020    Procedure: Angiogram Extremity Unilateral;  Surgeon: Ali Khoobehi, MD;  Location: [facility] CATH/EP LAB;  Service: Cardiology;  Laterality: Left;    APPENDECTOMY      CARDIAC CATHETERIZATION  07/03/2017    CHOLECYSTECTOMY      heal surgery Right     HYSTERECTOMY      INSERTION OF STENT INTO PERIPHERAL VESSEL N/A 1/7/2020    Procedure: INSERTION, STENT, VESSEL,  PERIPHERAL;  Surgeon: Joseph Loyola MD;  Location: [facility] CATH/EP LAB;  Service: Cardiology;  Laterality: N/A;    MITRAL VALVE REPLACEMENT      PARATHYROIDECTOMY      PARATHYROIDECTOMY  07/13/2017    PERCUTANEOUS TRANSLUMINAL ANGIOPLASTY OF ARTERIOVENOUS FISTULA N/A 1/7/2020    Procedure: PTA, AV FISTULA;  Surgeon: Joseph Loyola MD;  Location: [facility] CATH/EP LAB;  Service: Cardiology;  Laterality: N/A;    PERITONEAL CATHETER INSERTION      RENAL BIOPSY      vocal cord nodule      WOUND DEBRIDEMENT Left 7/13/2020    Procedure: DEBRIDEMENT, WOUND;  Surgeon: Carlos Elam III, MD;  Location: [facility] OR;  Service: General;  Laterality: Left;     Family History   Problem Relation Age of Onset    Heart disease Sister     Early death Sister         heart as baby    Heart disease Maternal Grandfather     Diabetes Mother     Hypertension Mother     Heart failure Mother     Heart disease Mother     Arthritis Mother     Diabetes Father     Early death Sister         infant     Social History     Tobacco Use    Smoking status: Current Some Day Smoker     Packs/day: 0.50     Years: 45.00     Pack years: 22.50     Types: Cigarettes    Smokeless tobacco: Never Used   Substance Use Topics    Alcohol use: No    Drug use: No     Review of Systems   Constitutional: Negative for chills and fever.   HENT: Negative for congestion and sore throat.    Eyes: Negative for photophobia and visual disturbance.   Respiratory: Negative for shortness of breath.    Cardiovascular: Negative for chest pain and palpitations.   Gastrointestinal: Negative for blood in stool, diarrhea, nausea and vomiting.   Genitourinary: Negative for dysuria and flank pain.   Musculoskeletal: Negative for back pain.   Skin: Positive for wound. Negative for rash.   Neurological: Negative for weakness and light-headedness.   Hematological: Does not bruise/bleed easily.   Psychiatric/Behavioral: Negative for agitation and behavioral problems. The  patient is nervous/anxious.    All other systems reviewed and are negative.      Physical Exam     Initial Vitals [07/22/20 1357]   BP Pulse Resp Temp SpO2   107/62 104 20 98.9 °F (37.2 °C) 97 %      MAP       --         Physical Exam    Nursing note and vitals reviewed.  Constitutional: She appears well-developed. She appears distressed.   HENT:   Head: Normocephalic and atraumatic.   Eyes: EOM are normal. Pupils are equal, round, and reactive to light.   Neck: Normal range of motion.   Cardiovascular: Normal heart sounds and intact distal pulses.   Pulmonary/Chest: Breath sounds normal. No respiratory distress.   Abdominal: Soft. Bowel sounds are normal.   Musculoskeletal: Normal range of motion. Tenderness (Large surgical to left inner thigh, with wound VAC sponge in place.  Exam limited due to pain.) present.   Neurological: She is alert and oriented to person, place, and time.   Skin: Skin is warm and dry.   Psychiatric: She has a normal mood and affect. Thought content normal.         ED Course   Procedures  Labs Reviewed   CBC W/ AUTO DIFFERENTIAL - Abnormal; Notable for the following components:       Result Value    RBC 3.78 (*)     Hemoglobin 10.2 (*)     Hematocrit 34.3 (*)     Mean Corpuscular Hemoglobin Conc 29.7 (*)     RDW 18.5 (*)     Gran # (ANC) 8.5 (*)     Mono # 1.1 (*)     Gran% 78.9 (*)     Lymph% 9.1 (*)     All other components within normal limits   COMPREHENSIVE METABOLIC PANEL - Abnormal; Notable for the following components:    Chloride 94 (*)     Creatinine 3.9 (*)     Total Protein 8.7 (*)     Albumin 3.2 (*)     Alkaline Phosphatase 175 (*)     eGFR if  12.5 (*)     eGFR if non  10.8 (*)     All other components within normal limits   PHOSPHORUS - Abnormal; Notable for the following components:    Phosphorus 2.3 (*)     All other components within normal limits   PROTIME-INR - Abnormal; Notable for the following components:    PT 21.0 (*)     All other  components within normal limits   MAGNESIUM   SARS-COV-2 RNA AMPLIFICATION, QUAL     EKG Readings: (Independently Interpreted)   Initial Reading: No STEMI. Previous EKG: Compared with most recent EKG Rhythm: Atrial Fibrillation. Heart Rate: 110. Clinical Impression: Atrial Fibrillation       Imaging Results    None          Medical Decision Making:   History:   I obtained history from: someone other than patient.  Old Medical Records: I decided to obtain old medical records.  Old Records Summarized: records from clinic visits and records from previous admission(s).  Initial Assessment:   PGY-4 MDM    Assessment:  74-year-old female with ESRD, AFib, here for wound VAC issue.  Mildly tachycardic but in pain.  Exam of wound VAC sponge limited due to her pain.  Last HD today.   Ddx:  Wound VAC issue  Workup:  CBC, CMP, Mag, phos, and EKG.  Surgery consulted as I was told by nursing staff that they wanted to be contacted when she arrived.  While she was waiting in the waiting room, wound care was consulted with no response.  Dispo:   Pending work-up and and re-evaluation.  Anticipate discharge if lab work normal and wound vac issue resolved.     Case discussed with Dr. Saji Jaramillo  Internal Medicine/Emergency Medicine, PGY-4  7:34 PM      I inadvertently documented under resident attestation.  I personally evaluated and examined this patient.  Please see previous attestation.  I agree with midlevel provider documentation.  I was present for entire procedure.              Attending Attestation:   Physician Attestation Statement for Resident:  As the supervising MD   Physician Attestation Statement: I have personally seen and examined this patient.   I agree with the above history. -: Patient with a history of calciphylaxis.  Patient has a large left medial thigh wound.  Problems with wound care.  Home health unable to get wound VAC on wound.  There is a large amount of serous drainage.  Patient is  end-stage renal disease.  She did have dialysis today.  History of atrial thrombus and schedule for T tomorrow.   As the supervising MD I agree with the above PE.   -: Large left medial thigh ulcer/wound no significant surrounding erythema.  Large amount of serous drainage despite ABD pad and Kerlix.   As the supervising MD I agree with the above treatment, course, plan, and disposition.   -: Patient presents with large wound with continued serous drainage.  Discussed with Dr. Barr call for Dr. Elam.  He agrees best course of action is admit for wound care and wound VAC placement tomorrow.  Discussed with Dr. Eason and house supervisor to ensure patient still can have T tomorrow.  I was personally present during the entire procedure.  I have reviewed and agree with the residents interpretation of the following: lab data.  I have reviewed the following: old records at this facility.                    ED Course as of Jul 22 2104   Wed Jul 22, 2020 2035 Spoke with cardiologist, Dr. Eason.  He states that it should be no problem to coordinate her TTE and wound VAC change while sedated tomorrow.  Will notify house supervisor.  Hospital medicine consulted for admission.     [CH]      ED Course User Index  [CH] Angelika Jaramillo MD                Clinical Impression:     Left leg wound with intractable pain                             Angelika Jaramillo MD  Resident  07/22/20 2104       Sudheer Lennon MD  07/22/20 2124       Sudheer Lennon MD  08/05/20 5013

## 2020-07-23 ENCOUNTER — ANESTHESIA EVENT (OUTPATIENT)
Dept: CARDIOLOGY | Facility: HOSPITAL | Age: 75
End: 2020-07-23
Payer: MEDICARE

## 2020-07-23 ENCOUNTER — CLINICAL SUPPORT (OUTPATIENT)
Dept: CARDIOLOGY | Facility: HOSPITAL | Age: 75
End: 2020-07-23
Attending: INTERNAL MEDICINE
Payer: MEDICARE

## 2020-07-23 ENCOUNTER — ANESTHESIA (OUTPATIENT)
Dept: CARDIOLOGY | Facility: HOSPITAL | Age: 75
End: 2020-07-23
Payer: MEDICARE

## 2020-07-23 VITALS
DIASTOLIC BLOOD PRESSURE: 63 MMHG | RESPIRATION RATE: 18 BRPM | HEIGHT: 65 IN | SYSTOLIC BLOOD PRESSURE: 124 MMHG | HEART RATE: 121 BPM | BODY MASS INDEX: 20.17 KG/M2 | TEMPERATURE: 99 F | WEIGHT: 121.06 LBS | OXYGEN SATURATION: 100 %

## 2020-07-23 LAB
ALBUMIN SERPL BCP-MCNC: 2.6 G/DL (ref 3.5–5.2)
ALP SERPL-CCNC: 137 U/L (ref 55–135)
ALT SERPL W/O P-5'-P-CCNC: 14 U/L (ref 10–44)
ANION GAP SERPL CALC-SCNC: 11 MMOL/L (ref 8–16)
AST SERPL-CCNC: 27 U/L (ref 10–40)
BASOPHILS # BLD AUTO: 0.01 K/UL (ref 0–0.2)
BASOPHILS NFR BLD: 0.1 % (ref 0–1.9)
BILIRUB SERPL-MCNC: 0.8 MG/DL (ref 0.1–1)
BUN SERPL-MCNC: 25 MG/DL (ref 8–23)
CALCIUM SERPL-MCNC: 8.5 MG/DL (ref 8.7–10.5)
CHLORIDE SERPL-SCNC: 98 MMOL/L (ref 95–110)
CO2 SERPL-SCNC: 27 MMOL/L (ref 23–29)
CREAT SERPL-MCNC: 4.6 MG/DL (ref 0.5–1.4)
DIFFERENTIAL METHOD: ABNORMAL
EOSINOPHIL # BLD AUTO: 0.1 K/UL (ref 0–0.5)
EOSINOPHIL NFR BLD: 0.7 % (ref 0–8)
ERYTHROCYTE [DISTWIDTH] IN BLOOD BY AUTOMATED COUNT: 18.1 % (ref 11.5–14.5)
EST. GFR  (AFRICAN AMERICAN): 10.2 ML/MIN/1.73 M^2
EST. GFR  (NON AFRICAN AMERICAN): 8.9 ML/MIN/1.73 M^2
GLUCOSE SERPL-MCNC: 53 MG/DL (ref 70–110)
HCT VFR BLD AUTO: 28.6 % (ref 37–48.5)
HGB BLD-MCNC: 8.6 G/DL (ref 12–16)
IMM GRANULOCYTES # BLD AUTO: 0.03 K/UL (ref 0–0.04)
IMM GRANULOCYTES NFR BLD AUTO: 0.3 % (ref 0–0.5)
INR PPP: 2.1
LYMPHOCYTES # BLD AUTO: 1.1 K/UL (ref 1–4.8)
LYMPHOCYTES NFR BLD: 10 % (ref 18–48)
MAGNESIUM SERPL-MCNC: 1.9 MG/DL (ref 1.6–2.6)
MCH RBC QN AUTO: 26.8 PG (ref 27–31)
MCHC RBC AUTO-ENTMCNC: 30.1 G/DL (ref 32–36)
MCV RBC AUTO: 89 FL (ref 82–98)
MONOCYTES # BLD AUTO: 1 K/UL (ref 0.3–1)
MONOCYTES NFR BLD: 9.3 % (ref 4–15)
NEUTROPHILS # BLD AUTO: 8.7 K/UL (ref 1.8–7.7)
NEUTROPHILS NFR BLD: 79.6 % (ref 38–73)
NRBC BLD-RTO: 0 /100 WBC
PHOSPHATE SERPL-MCNC: 2.3 MG/DL (ref 2.7–4.5)
PLATELET # BLD AUTO: 284 K/UL (ref 150–350)
PMV BLD AUTO: 11.1 FL (ref 9.2–12.9)
POTASSIUM SERPL-SCNC: 5 MMOL/L (ref 3.5–5.1)
PROT SERPL-MCNC: 7 G/DL (ref 6–8.4)
PROTHROMBIN TIME: 22.8 SEC (ref 10.6–14.8)
RBC # BLD AUTO: 3.21 M/UL (ref 4–5.4)
SARS-COV-2 RNA RESP QL NAA+PROBE: NOT DETECTED
SODIUM SERPL-SCNC: 136 MMOL/L (ref 136–145)
WBC # BLD AUTO: 10.94 K/UL (ref 3.9–12.7)

## 2020-07-23 PROCEDURE — 25000003 PHARM REV CODE 250: Performed by: NURSE PRACTITIONER

## 2020-07-23 PROCEDURE — 63600175 PHARM REV CODE 636 W HCPCS: Performed by: INTERNAL MEDICINE

## 2020-07-23 PROCEDURE — 25000003 PHARM REV CODE 250: Performed by: NURSE ANESTHETIST, CERTIFIED REGISTERED

## 2020-07-23 PROCEDURE — 27000671 HC TUBING MICROBORE EXT: Performed by: ANESTHESIOLOGY

## 2020-07-23 PROCEDURE — G0378 HOSPITAL OBSERVATION PER HR: HCPCS | Mod: CS

## 2020-07-23 PROCEDURE — 63600175 PHARM REV CODE 636 W HCPCS

## 2020-07-23 PROCEDURE — 83735 ASSAY OF MAGNESIUM: CPT

## 2020-07-23 PROCEDURE — 37000008 HC ANESTHESIA 1ST 15 MINUTES: Performed by: INTERNAL MEDICINE

## 2020-07-23 PROCEDURE — 85025 COMPLETE CBC W/AUTO DIFF WBC: CPT

## 2020-07-23 PROCEDURE — 36415 COLL VENOUS BLD VENIPUNCTURE: CPT

## 2020-07-23 PROCEDURE — 84100 ASSAY OF PHOSPHORUS: CPT

## 2020-07-23 PROCEDURE — 37000009 HC ANESTHESIA EA ADD 15 MINS: Performed by: INTERNAL MEDICINE

## 2020-07-23 PROCEDURE — 93321 DOPPLER ECHO F-UP/LMTD STD: CPT

## 2020-07-23 PROCEDURE — 96375 TX/PRO/DX INJ NEW DRUG ADDON: CPT | Mod: 59

## 2020-07-23 PROCEDURE — 63600175 PHARM REV CODE 636 W HCPCS: Performed by: NURSE ANESTHETIST, CERTIFIED REGISTERED

## 2020-07-23 PROCEDURE — 85610 PROTHROMBIN TIME: CPT

## 2020-07-23 PROCEDURE — 96376 TX/PRO/DX INJ SAME DRUG ADON: CPT | Mod: 59

## 2020-07-23 PROCEDURE — 63600175 PHARM REV CODE 636 W HCPCS: Performed by: SURGERY

## 2020-07-23 PROCEDURE — 80053 COMPREHEN METABOLIC PANEL: CPT

## 2020-07-23 RX ORDER — PROPOFOL 10 MG/ML
VIAL (ML) INTRAVENOUS
Status: DISCONTINUED | OUTPATIENT
Start: 2020-07-23 | End: 2020-07-23

## 2020-07-23 RX ORDER — HYDROMORPHONE HYDROCHLORIDE 1 MG/ML
0.5 INJECTION, SOLUTION INTRAMUSCULAR; INTRAVENOUS; SUBCUTANEOUS ONCE
Status: COMPLETED | OUTPATIENT
Start: 2020-07-23 | End: 2020-07-23

## 2020-07-23 RX ORDER — BALSAM PERU/CASTOR OIL
OINTMENT (GRAM) TOPICAL DAILY
Status: DISCONTINUED | OUTPATIENT
Start: 2020-07-23 | End: 2020-07-23 | Stop reason: HOSPADM

## 2020-07-23 RX ORDER — MORPHINE SULFATE 4 MG/ML
INJECTION, SOLUTION INTRAMUSCULAR; INTRAVENOUS
Status: COMPLETED
Start: 2020-07-23 | End: 2020-07-23

## 2020-07-23 RX ORDER — MORPHINE SULFATE 4 MG/ML
4 INJECTION, SOLUTION INTRAMUSCULAR; INTRAVENOUS ONCE
Status: DISCONTINUED | OUTPATIENT
Start: 2020-07-23 | End: 2020-07-23 | Stop reason: HOSPADM

## 2020-07-23 RX ORDER — SODIUM CHLORIDE 9 MG/ML
INJECTION, SOLUTION INTRAVENOUS CONTINUOUS PRN
Status: DISCONTINUED | OUTPATIENT
Start: 2020-07-23 | End: 2020-07-23

## 2020-07-23 RX ADMIN — LOSARTAN POTASSIUM 25 MG: 25 TABLET, FILM COATED ORAL at 10:07

## 2020-07-23 RX ADMIN — PROPOFOL 20 MG: 10 INJECTION, EMULSION INTRAVENOUS at 08:07

## 2020-07-23 RX ADMIN — PANTOPRAZOLE SODIUM 40 MG: 40 TABLET, DELAYED RELEASE ORAL at 10:07

## 2020-07-23 RX ADMIN — ZINC SULFATE 220 MG (50 MG) CAPSULE 220 MG: CAPSULE at 10:07

## 2020-07-23 RX ADMIN — PROPOFOL 30 MG: 10 INJECTION, EMULSION INTRAVENOUS at 08:07

## 2020-07-23 RX ADMIN — DILTIAZEM HYDROCHLORIDE 180 MG: 180 CAPSULE, COATED, EXTENDED RELEASE ORAL at 10:07

## 2020-07-23 RX ADMIN — MORPHINE SULFATE: 4 INJECTION INTRAVENOUS at 12:07

## 2020-07-23 RX ADMIN — ISOSORBIDE MONONITRATE 30 MG: 30 TABLET, EXTENDED RELEASE ORAL at 10:07

## 2020-07-23 RX ADMIN — HYDROMORPHONE HYDROCHLORIDE 0.5 MG: 1 INJECTION, SOLUTION INTRAMUSCULAR; INTRAVENOUS; SUBCUTANEOUS at 01:07

## 2020-07-23 RX ADMIN — TRAMADOL HYDROCHLORIDE 50 MG: 50 TABLET, FILM COATED ORAL at 05:07

## 2020-07-23 RX ADMIN — METOPROLOL TARTRATE 25 MG: 25 TABLET, FILM COATED ORAL at 10:07

## 2020-07-23 RX ADMIN — OXYCODONE AND ACETAMINOPHEN 1 TABLET: 5; 325 TABLET ORAL at 10:07

## 2020-07-23 RX ADMIN — MAGNESIUM OXIDE 400 MG: 400 TABLET ORAL at 10:07

## 2020-07-23 RX ADMIN — SODIUM CHLORIDE: 9 INJECTION, SOLUTION INTRAVENOUS at 08:07

## 2020-07-23 NOTE — PROGRESS NOTES
Patient is status post wide debridement of a left thigh wound by Dr. Elam.    Wound VAC was placed and she was discharged home with home health.  Patient refused to allow them to remove the wound VAC on Monday.  She came to the emergency room yesterday because of increased drainage.  Initially she was refusing to allow the wound care nurse to remove the wound VAC as well.    I discussed with patient that she must allow this to be removed.  She was given oral pain medications as well as IV morphine.  I was at the bedside when the sponge was removed.  The area seems to be granulating very well.  There was minimal oozing.    Discussed with wound care nurse.  We will have to resort to daily wound care and dressing changes if the patient is not going to allow the wound VAC sponge to be removed when appropriate.  Discussed with hospitalist.  Patient will be discharged home today.

## 2020-07-23 NOTE — PLAN OF CARE
07/23/20 1437   CORTEZ Message   Medicare Outpatient and Observation Notification regarding financial responsibility Given to patient/caregiver;Explained to patient/caregiver;Signed/date by patient/caregiver   Date CORTEZ was signed 07/23/20   Time CORTEZ was signed [id number]

## 2020-07-23 NOTE — ANESTHESIA POSTPROCEDURE EVALUATION
Anesthesia Post Evaluation    Patient: Griselda Neely    Procedure(s) Performed: Procedure(s) (LRB):  ECHOCARDIOGRAM,TRANSESOPHAGEAL (N/A)    Final Anesthesia Type: MAC    Patient location during evaluation: OPS  Patient participation: Yes- Able to Participate  Level of consciousness: awake and alert  Post-procedure vital signs: reviewed and stable  Pain management: adequate  Airway patency: patent    PONV status at discharge: No PONV  Anesthetic complications: no      Cardiovascular status: hemodynamically stable  Respiratory status: unassisted, spontaneous ventilation and nasal cannula  Hydration status: euvolemic  Follow-up not needed.          Vitals Value Taken Time   /60 07/23/20 0830   Temp  07/23/20 1018   Pulse 111 07/23/20 0845   Resp 31 07/23/20 0844   SpO2 100 % 07/23/20 0845   Vitals shown include unvalidated device data.      No case tracking events are documented in the log.      Pain/Skip Score: Pain Rating Prior to Med Admin: 10 (7/23/2020  5:51 AM)  Pain Rating Post Med Admin: 0 (7/23/2020  1:09 AM)  Skip Score: 10 (7/23/2020  8:14 AM)

## 2020-07-23 NOTE — ANESTHESIA PREPROCEDURE EVALUATION
07/23/2020  Griselda Neely is a 74 y.o., female.  Patient Active Problem List   Diagnosis    HTN (hypertension)    PND (paroxysmal nocturnal dyspnea)    Tobacco dependence    ESRD (end stage renal disease) on HD M,W, F    Hyperparathyroidism    Other persistent atrial fibrillation    Anemia due to chronic kidney disease    Pulmonary hypertension    Non-rheumatic mitral regurgitation    DNR (do not resuscitate)    Chronic respiratory failure    Coronary arteriosclerosis in native artery    Gastroesophageal reflux disease    S/P parathyroidectomy    Tension-type headache    Hyperparathyroidism due to renal insufficiency    Functional dyspepsia    HLD (hyperlipidemia)    PVD (peripheral vascular disease)    Encephalopathy    Chronic systolic heart failure    Peripheral vascular disease now with left lower extremity occlusion    H/O mitral valve replacement    Gout    Anemia    Chronic pain on chronic narcotics    Left leg pain    Atrial fibrillation with RVR    Acute on chronic respiratory failure    Volume overload    Calciphylaxis    Encephalopathy chronic    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    Malnutrition of moderate degree    Visit for wound check       Past Surgical History:   Procedure Laterality Date    ACHILLES TENDON SURGERY Right     ANGIOGRAPHY OF ARTERIOVENOUS SHUNT Right 1/7/2020    Procedure: Fistulogram with Possible Intervention;  Surgeon: Joseph Loyola MD;  Location: [facility] CATH/EP LAB;  Service: Cardiology;  Laterality: Right;    ANGIOGRAPHY OF LOWER EXTREMITY Left 3/18/2020    Procedure: Angiogram Extremity Unilateral;  Surgeon: Ali Khoobehi, MD;  Location: [facility] CATH/EP LAB;  Service: Cardiology;  Laterality: Left;    APPENDECTOMY      CARDIAC CATHETERIZATION  07/03/2017    CHOLECYSTECTOMY      heal  surgery Right     HYSTERECTOMY      INSERTION OF STENT INTO PERIPHERAL VESSEL N/A 1/7/2020    Procedure: INSERTION, STENT, VESSEL, PERIPHERAL;  Surgeon: Joseph Loyola MD;  Location: [facility] CATH/EP LAB;  Service: Cardiology;  Laterality: N/A;    MITRAL VALVE REPLACEMENT      PARATHYROIDECTOMY      PARATHYROIDECTOMY  07/13/2017    PERCUTANEOUS TRANSLUMINAL ANGIOPLASTY OF ARTERIOVENOUS FISTULA N/A 1/7/2020    Procedure: PTA, AV FISTULA;  Surgeon: Joseph Loyola MD;  Location: [facility] CATH/EP LAB;  Service: Cardiology;  Laterality: N/A;    PERITONEAL CATHETER INSERTION      RENAL BIOPSY      vocal cord nodule      WOUND DEBRIDEMENT Left 7/13/2020    Procedure: DEBRIDEMENT, WOUND;  Surgeon: Carlos Elam III, MD;  Location: [facility] OR;  Service: General;  Laterality: Left;        Tobacco Use:  The patient  reports that she has been smoking cigarettes. She has a 22.50 pack-year smoking history. She has never used smokeless tobacco.     Results for orders placed or performed during the hospital encounter of 07/22/20   EKG 12-lead    Collection Time: 07/22/20  7:13 PM    Narrative    Test Reason : E87.5,    Vent. Rate : 110 BPM     Atrial Rate : 094 BPM     P-R Int : 000 ms          QRS Dur : 098 ms      QT Int : 338 ms       P-R-T Axes : 000 -24 090 degrees     QTc Int : 457 ms    Atrial fibrillation with rapid ventricular response  Nonspecific T wave abnormality  Abnormal ECG  When compared with ECG of 07-JUL-2020 10:42,  Nonspecific T wave abnormality, worse in Lateral leads    Referred By: [email or web address]   SELF           Confirmed By:              Lab Results   Component Value Date    WBC 10.94 07/23/2020    HGB 8.6 (L) 07/23/2020    HCT 28.6 (L) 07/23/2020    MCV 89 07/23/2020     07/23/2020     BMP  Lab Results   Component Value Date     07/23/2020    K 5.0 07/23/2020    CL 98 07/23/2020    CO2 27 07/23/2020    BUN 25 (H) 07/23/2020    CREATININE 4.6 (H) 07/23/2020    CALCIUM 8.5 (L) 07/23/2020     ANIONGAP 11 07/23/2020    ESTGFRAFRICA 10.2 (A) 07/23/2020    EGFRNONAA 8.9 (A) 07/23/2020       Echocardiography Findings     Left Ventricle Severe decreased ejection fraction at 25%. Eccentric hypertrophy observed. Grade IV (severe) left ventricular diastolic dysfunction. Elevated left atrial pressure.   Right Ventricle The right ventricle is moderately dilated. The systolic function is moderately reduced.   Left Atrium There is severe left atrial enlargement. Layered thrombus suggested. The thrombus is fixed and located in the inferior cavity.   Right Atrium There is moderate right atrial enlargement.   Aortic Valve Aortic valve sclerosis is moderate. The LVOT diameter is 2.09 cm.   Mitral Valve Trace regurgitation. There is a bioprosthetic mitral valve. The prosthetic valve is normal.   Tricuspid Valve Moderate regurgitation. Mild to moderate pulmonary hypertension present.   Pulmonic Valve Mild to moderate regurgitation.   Pericardium Small posterior pericardial effusion. No cardiac tamponade present.       Pre-op Assessment    I have reviewed the Patient Summary Reports.     I have reviewed the Nursing Notes.    I have reviewed the Medications.     Review of Systems  Anesthesia Hx:  No problems with previous Anesthesia  History of prior surgery of interest to airway management or planning: Previous anesthesia: General Denies Family Hx of Anesthesia complications.   Denies Personal Hx of Anesthesia complications.   Social:  Smoker, No Alcohol Use    Hematology/Oncology:         -- Anemia: Hematology Comments: Anemia secondary to CKD   Cardiovascular:   Hypertension, poorly controlled Valvular problems/Murmurs, MR CAD  Dysrhythmias atrial fibrillation CHF Orthopnea PND PVD hyperlipidemia ARGUELLO ECG has been reviewed. Hx. Lt. Atrial Thrombus   Diastolic Heart Failure   EF 25% Chronic CHF   Pulmonary HTN   Pulmonary:   Pneumonia COPD, severe Shortness of breath Acute on chronic respiratory failure with hypoxia   Rt.  Pleural Effusion   Home O2 3L/min  BiPAP on admit   Renal/:   Chronic Renal Disease, Dialysis, CRI, ESRD Dialysis M,W,F  Last dialysis Friday    Hepatic/GI:   GERD Melena this admit   Musculoskeletal:   Arthritis  Left Leg Wound   Neurological:   Headaches Seizures    Endocrine:  Endocrine Normal Hypoglycemia Denies Thyroid Disease  Parathyroid Disease, Hyperparathyroidism, s/p parathyroidectomy Hyperparathyroidism secondary to renal insufficiency          Physical Exam  General:  Malnutrition       Chest/Lungs:  Chest/Lungs Findings: (bibasilar crackles) Decreased Breath Sounds Bilateral     Heart/Vascular:  Heart Findings: Rate: Normal  Rhythm: Irregularly Irregular        Mental Status:  Mental Status Findings:  Cooperative, Alert and Oriented         Anesthesia Plan  Type of Anesthesia, risks & benefits discussed:  Anesthesia Type:  MAC  Patient's Preference: MAC  Intra-op Monitoring Plan: standard ASA monitors  Intra-op Monitoring Plan Comments:   Post Op Pain Control Plan: per primary service following discharge from PACU  Post Op Pain Control Plan Comments:   Induction:    Beta Blocker:  Patient is on a Beta-Blocker and has received one dose within the past 24 hours (No further documentation required).       Informed Consent:    ASA Score: 4  emergent   Day of Surgery Review of History & Physical:        Anesthesia Plan Notes: MAC  Propofol        Ready For Surgery From Anesthesia Perspective.

## 2020-07-23 NOTE — NURSING
Patient and sister given discharge instructions understanding verbalized. IV discontinued patient tolerated well. Patient discharged via wheelchair to personal vehicle.

## 2020-07-23 NOTE — INTERVAL H&P NOTE
The patient has been examined and the H&P has been reviewed:    I concur with the findings and no changes have occurred since H&P was written.    Anesthesia/Surgery risks, benefits and alternative options discussed and understood by patient/family.          Active Hospital Problems    Diagnosis  POA    Visit for wound check [Z51.89]  Not Applicable      Resolved Hospital Problems   No resolved problems to display.

## 2020-07-23 NOTE — TRANSFER OF CARE
"Anesthesia Transfer of Care Note    Patient: Griselda Neely    Procedure(s) Performed: Procedure(s) (LRB):  ECHOCARDIOGRAM,TRANSESOPHAGEAL (N/A)    Patient location: PACU    Anesthesia Type: general    Transport from OR: Transported from OR on room air with adequate spontaneous ventilation    Post pain: adequate analgesia    Post assessment: no apparent anesthetic complications    Post vital signs: stable    Level of consciousness: awake and alert    Nausea/Vomiting: no nausea/vomiting    Complications: none    Transfer of care protocol was followed      Last vitals:   Visit Vitals  /72   Pulse 110   Temp 36.6 °C (97.8 °F) (Axillary)   Resp 20   Ht 5' 5" (1.651 m)   Wt 54.9 kg (121 lb 0.5 oz)   LMP  (LMP Unknown)   SpO2 100%   Breastfeeding No   BMI 20.14 kg/m²     "

## 2020-07-23 NOTE — HOSPITAL COURSE
Patient was admitted for the following medical problems  Left leg wound with intractable pain:  Wound vac sponge adhered to wound  Consult general surgery and wound care consult appreciated  Wound dressing that stuck to the wound removed after adequate analgesia  Advised daily dressing on discharge with home health     Left atrial thrombus:  Patient had vadim with Dr. Powell  The clot size decreased, recommended to continue with warfarin     A-fib:  Rate   Continue coumadin and BB  INR2,1  Daily PT/INR     ESRD on HD:  Patient can resume her outpatient dialysis tomorrow  Did not require dialysis in hospital    Instructions provided to follow up with primary care physician as outpatient. Patient verbalized understanding and is aware to contact primary care physician or return to ED if new or worsening symptoms.    Physical exam on the day of discharge:  General: Patient resting comfortably in no acute distress.  Thin built appears dry chronic ill-looking  Lungs: CTA. Good air entry.  Cor: Regular rate and rhythm. No murmurs. No pedal edema.  Abd: Soft. Nontender. Non-distended.  Neuro: A&O x3. Moving all 4 extremities equally  Ext: No clubbing. No cyanosis.      left thigh wound dressing changed

## 2020-07-23 NOTE — DISCHARGE SUMMARY
[facility] Medicine  Discharge Summary      Patient Name: Griselda Neely  MRN: [id number]  Admission Date: 7/22/2020  Hospital Length of Stay: 0 days  Discharge Date and Time:  07/23/2020 2:22 PM  Attending Physician: Cierra Thomas MD   Discharging Provider: Cierra Thomas MD  Primary Care Provider: Kalie Neely MD      HPI:   No notes on file    Procedure(s) (LRB):  ECHOCARDIOGRAM,TRANSESOPHAGEAL (N/A)      Hospital Course:   Patient was admitted for the following medical problems  Left leg wound with intractable pain:  Wound vac sponge adhered to wound  Consult general surgery and wound care consult appreciated  Wound dressing that stuck to the wound removed after adequate analgesia  Advised daily dressing on discharge with home health     Left atrial thrombus:  Patient had vadim with Dr. Powell  The clot size decreased, recommended to continue with warfarin     A-fib:  Rate   Continue coumadin and BB  INR2,1  Daily PT/INR     ESRD on HD:  Patient can resume her outpatient dialysis tomorrow  Did not require dialysis in hospital    Instructions provided to follow up with primary care physician as outpatient. Patient verbalized understanding and is aware to contact primary care physician or return to ED if new or worsening symptoms.    Physical exam on the day of discharge:  General: Patient resting comfortably in no acute distress.  Thin built appears dry chronic ill-looking  Lungs: CTA. Good air entry.  Cor: Regular rate and rhythm. No murmurs. No pedal edema.  Abd: Soft. Nontender. Non-distended.  Neuro: A&O x3. Moving all 4 extremities equally  Ext: No clubbing. No cyanosis.      left thigh wound dressing changed     Consults:   Consults (From admission, onward)        Status Ordering Provider     Inpatient consult to Cardiology  Once     Provider:  MD Helder Lange NERISSA     Inpatient consult to General surgery  Once      Provider:  Carlos Elam III, MD    Acknowledged GRIS HIGGINBOTHAM     Inpatient consult to General Surgery  Once     Provider:  Carlos Elam III, MD    Acknowledged SKYE DOBBS     Inpatient consult to Hospitalist  Once     Provider:  Chalino Victoria MD    Acknowledged GRIS HIGGINBOTHAM          No new Assessment & Plan notes have been filed under this hospital service since the last note was generated.  Service: Hospital Medicine    Final Active Diagnoses:    Diagnosis Date Noted POA    PRINCIPAL PROBLEM:  Visit for wound check [Z51.89] 07/22/2020 Not Applicable      Problems Resolved During this Admission:       Discharged Condition: stable    Disposition: Home-Health Care [facility] [place]    Follow Up:  Follow-up Information     Kalie Neely MD In 1 week.    Specialty: Family Medicine  Contact information:  [facility]  [address] [place] [id number] [phone]                 Patient Instructions:      Diet Cardiac     Notify your health care provider if you experience any of the following:  temperature >100.4     Notify your health care provider if you experience any of the following:  persistent nausea and vomiting or diarrhea     Notify your health care provider if you experience any of the following:  persistent dizziness, light-headedness, or visual disturbances     Activity as tolerated       Significant Diagnostic Studies: Labs:   BMP:   Recent Labs   Lab 07/22/20 1943 07/23/20  0458   GLU 75 53*    136   K 4.8 5.0   CL 94* 98   CO2 29 27   BUN 19 25*   CREATININE 3.9* 4.6*   CALCIUM 9.7 8.5*   MG 2.1 1.9    and CBC   Recent Labs   Lab 07/22/20 1943 07/23/20  0458   WBC 10.79 10.94   HGB 10.2* 8.6*   HCT 34.3* 28.6*    284       Pending Diagnostic Studies:     Procedure Component Value Units Date/Time    Transesophageal echo (TRUPTI) [[id number]] Resulted: 07/23/20 0841    Order Status: Sent Lab Status: In process Updated: 07/23/20 0842     BSA 1.51 m2           Medications:  Reconciled Home Medications:      Medication List      CONTINUE taking these medications    aspirin 81 MG EC tablet  Commonly known as: ECOTRIN  Take 81 mg by mouth once daily.     calcium acetate(phosphat bind) 667 mg capsule  Commonly known as: PHOSLO  Take 667 mg by mouth once daily.     diltiaZEM 180 MG 24 hr capsule  Commonly known as: CARDIZEM CD  Take 180 mg by mouth once daily.     isosorbide mononitrate 30 MG 24 hr tablet  Commonly known as: IMDUR  Take 30 mg by mouth once daily.     losartan 25 MG tablet  Commonly known as: COZAAR  Take 25 mg by mouth once daily.     magnesium oxide 400 mg (241.3 mg magnesium) tablet  Commonly known as: MAG-OX  Take 400 mg by mouth once daily.     metoprolol tartrate 25 MG tablet  Commonly known as: LOPRESSOR  Take 25 mg by mouth 2 (two) times daily.     pantoprazole 40 MG tablet  Commonly known as: PROTONIX  Take 40 mg by mouth 2 (two) times daily.     JENNIFER-KODAK ORAL  Take 1 tablet by mouth once daily.     warfarin 3 MG tablet  Commonly known as: COUMADIN  Take 3 mg by mouth every evening.     zinc sulfate 220 mg Tab tablet  Take 220 mg by mouth every other day.        ASK your doctor about these medications    oxyCODONE-acetaminophen 5-325 mg per tablet  Commonly known as: PERCOCET  Take 1 tablet by mouth every 8 (eight) hours as needed for Pain (on dialysis days).  Ask about: Should I take this medication?     traMADoL 50 mg tablet  Commonly known as: ULTRAM  Take 1 tablet (50 mg total) by mouth every 8 (eight) hours as needed for Pain.  Ask about: Should I take this medication?            Indwelling Lines/Drains at time of discharge:   Lines/Drains/Airways     Drain                 Hemodialysis AV Fistula 08/08/19 0214 Right upper arm 350 days                Time spent on the discharge of patient: 25 minutes  Patient was seen and examined on the date of discharge and determined to be suitable for discharge.         Cierra Thomas MD  Department of  [facility] Medicine  [facility]

## 2020-07-23 NOTE — NURSING
74 yr old female well known to wound care and very noncompliant. Daughter at bedside.  With last visit to the hospital, pt had I&D to the lt upper inner thigh and was sent home with NPWT with home health dressing change and outpt wound care. She is here now because she refused to let anyone change the dressing since Monday, so the dressing has been in for at least 7 days or even more. She says it hurts to bad to touch and that we don't understand the pain she is in.    She would not let me undress the site. Daughter says the tubing has fell off. Asked if the plastic is still on and she says yes. Says it has been draining also. States that wound care screwed her leg up.  This is a very angry and manipulative person. She can be very mean and verbal. She is not going to let me take the dressing out.      Surgery has been consulted.      She c/o pain to the lt heel. Heel boggy, red. Says that the place took the heel pillows off. Then this happened.       Was able to place mepilex to bilat heels and placed heel pillows. She was able to roll over and allow me to see her buttock and sacral. Loose skin noted to the sacral with moisture. No breaks. Placed a mepilex.       Scar noted to the rt medial lower leg. Hx of wound to the area. Was able to get this wound to heal. Now has a small white spot in the center of the scar. With monitor.        Will wait to see if surgery can help to remove the dressing.

## 2020-07-23 NOTE — NURSING
74 yr old female       Dr Nunez present. For removal of the NPWT sponge that is stuck to the inner thigh of the lt upper leg.  Pt has had a pain pill and is being ordered a IV push.      Outer dressing removed.  Foam soaked with chlorhexidine/ns. Foam edges are crusted to the skin. Pickups and tongue blade used to loosen edges. Foam removed and ritchie wound cleaned. Was not able to clean the wound. Wound packed with aquacel ag with 4x4 and abd pad. kerlex and light ace wrap.       Upper lt thigh inner 3x3x.2 pink and yellow slough    10.5x8x1  Pink red yellow tan slough. Mod amt dark yellow drainage.     Recommend Every other day dressing changes with aquacel Ag. Clean wound with chlorhexidine/ns. Apply ns moist aquacel ag to the wound bed. Cover with adaptic, 4x4, abd pad and kerlex.

## 2020-07-23 NOTE — PROGRESS NOTES
Wound on left inner thigh. Non adherent dsg and kerlex in place. Wound vac sponge stuck inside wound.  Therapy currently off. Patient would not allow for current dressing to be removed

## 2020-07-23 NOTE — H&P
[facility] Medicine History & Physical Examination   Patient Name: Griselda Neely  MRN: [id number]  Patient Class: OP- Observation   Admission Date: 7/22/2020  5:23 PM  Length of Stay: 0  Attending Physician: Chalino Victoria MD  Primary Care Provider: Kalie Neely MD  Face-to-Face encounter date: 07/22/2020  Code Status: full code  Chief Complaint: Wound Check (Referred here for removal of adhered wound vac dressing, home health unable to remove, L leg)        Patient information was obtained from patient, past medical records and ER records.   HISTORY OF PRESENT ILLNESS:   Griselda Neely is a 74 y.o. Black or  female who  has a past medical history of Anemia, Calciphylaxis (07/2017), CHF (congestive heart failure), Encounter for blood transfusion (03/2016), Gout, Hemodialysis access, AV graft, Hypertension, Mitral valve regurgitation, Osteoarthritis, Pancreatitis, Peripheral vascular disease, Pneumonia (09/09/2017), and Renal failure.. The patient presented to [facility] on 7/22/2020 with a primary complaint of Wound Check (Referred here for removal of adhered wound vac dressing, home health unable to remove, L leg)  .       History was obtained from the patient and ER physician Sign-out. Patient presents to the ED for a wound check. She was recently discharged from the hospital here on July 15, 2020 after treatment for an infected wound to left leg s/p surgical debridement and wound vac placement. The patient was sent to the ED by the wound care nurse as she was unable to remove the wound vac sponge. The patient refused to have the wound vac changed on Monday, and it is now adhered to her skin. She is in too much pain to have the sponge removed from her leg. She denies fever, chills, nausea, vomiting, abdominal pain, chest pain, sob, numbness, tingling, or LOC. The patient has ESRD on HD MWF, and she went to dialysis today.     In the ED, EDD INGRAM  was unremarkable. CMP was unremarkable.  EKG shows A-fib with HR 110s. Surgery was consulted by the ED provider. It was recommended that the patient be admitted for wound care and wound vac placement in the AM. The patient reports she has a TRUPTI scheduled with Dr. Eason tomorrow for apical thrombus. Dr. Eason was contacted and the plan is for the patient to still get her TRUPTI in the AM.     Decision to admit was taken and patient was informed about the plan of care.   REVIEW OF SYSTEMS:   10 Point Review of System was performed and was found to be negative except for that mentioned already in the HPI above.     PAST MEDICAL HISTORY:     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    both legs    CHF (congestive heart failure)     Encounter for blood transfusion 03/2016    Gout     Hemodialysis access, AV graft     Hypertension     Mitral valve regurgitation     Osteoarthritis     Pancreatitis     Peripheral vascular disease     Pneumonia 09/09/2017    Renal failure        PAST SURGICAL HISTORY:     Past Surgical History:   Procedure Laterality Date    ACHILLES TENDON SURGERY Right     ANGIOGRAPHY OF ARTERIOVENOUS SHUNT Right 1/7/2020    Procedure: Fistulogram with Possible Intervention;  Surgeon: Joseph Loyola MD;  Location: [facility] CATH/EP LAB;  Service: Cardiology;  Laterality: Right;    ANGIOGRAPHY OF LOWER EXTREMITY Left 3/18/2020    Procedure: Angiogram Extremity Unilateral;  Surgeon: Ali Khoobehi, MD;  Location: [facility] CATH/EP LAB;  Service: Cardiology;  Laterality: Left;    APPENDECTOMY      CARDIAC CATHETERIZATION  07/03/2017    CHOLECYSTECTOMY      heal surgery Right     HYSTERECTOMY      INSERTION OF STENT INTO PERIPHERAL VESSEL N/A 1/7/2020    Procedure: INSERTION, STENT, VESSEL, PERIPHERAL;  Surgeon: Joseph Loyola MD;  Location: [facility] CATH/EP LAB;  Service: Cardiology;  Laterality: N/A;    MITRAL VALVE REPLACEMENT      PARATHYROIDECTOMY       PARATHYROIDECTOMY  07/13/2017    PERCUTANEOUS TRANSLUMINAL ANGIOPLASTY OF ARTERIOVENOUS FISTULA N/A 1/7/2020    Procedure: PTA, AV FISTULA;  Surgeon: Joseph Loyola MD;  Location: [facility] CATH/EP LAB;  Service: Cardiology;  Laterality: N/A;    PERITONEAL CATHETER INSERTION      RENAL BIOPSY      vocal cord nodule      WOUND DEBRIDEMENT Left 7/13/2020    Procedure: DEBRIDEMENT, WOUND;  Surgeon: Carlos Elam III, MD;  Location: [facility] OR;  Service: General;  Laterality: Left;       ALLERGIES:   Patient has no known allergies.    FAMILY HISTORY:     Family History   Problem Relation Age of Onset    Heart disease Sister     Early death Sister         heart as baby    Heart disease Maternal Grandfather     Diabetes Mother     Hypertension Mother     Heart failure Mother     Heart disease Mother     Arthritis Mother     Diabetes Father     Early death Sister         infant       SOCIAL HISTORY:     Social History     Tobacco Use    Smoking status: Current Some Day Smoker     Packs/day: 0.50     Years: 45.00     Pack years: 22.50     Types: Cigarettes    Smokeless tobacco: Never Used   Substance Use Topics    Alcohol use: No        Social History     Substance and Sexual Activity   Sexual Activity Not on file        HOME MEDICATIONS:     Prior to Admission medications    Medication Sig Start Date End Date Taking? Authorizing Provider   aspirin (ECOTRIN) 81 MG EC tablet Take 81 mg by mouth once daily.    Historical Provider, MD   calcium acetate (PHOSLO) 667 mg capsule Take 667 mg by mouth once daily.     Historical Provider, MD   diltiaZEM (CARDIZEM CD) 180 MG 24 hr capsule Take 180 mg by mouth once daily.    Historical Provider, MD   folic acid/vit B complex and C (JENNIFER-KODAK ORAL) Take 1 tablet by mouth once daily.    Historical Provider, MD   isosorbide mononitrate (IMDUR) 30 MG 24 hr tablet Take 30 mg by mouth once daily.    Historical Provider, MD   losartan (COZAAR) 25 MG tablet Take 25 mg by  "mouth once daily.    Historical Provider, MD   magnesium oxide (MAG-OX) 400 mg (241.3 mg magnesium) tablet Take 400 mg by mouth once daily.    Historical Provider, MD   metoprolol tartrate (LOPRESSOR) 25 MG tablet Take 25 mg by mouth 2 (two) times daily.    Historical Provider, MD   oxyCODONE-acetaminophen (PERCOCET) 5-325 mg per tablet Take 1 tablet by mouth every 8 (eight) hours as needed for Pain (on dialysis days). 7/15/20 7/22/20  Lissa Portillo MD   pantoprazole (PROTONIX) 40 MG tablet Take 40 mg by mouth 2 (two) times daily.    Historical Provider, MD   traMADoL (ULTRAM) 50 mg tablet Take 1 tablet (50 mg total) by mouth every 8 (eight) hours as needed for Pain. 7/15/20 7/22/20  Lissa Portillo MD   warfarin (COUMADIN) 3 MG tablet Take 3 mg by mouth every evening.    Historical Provider, MD   zinc sulfate 220 mg Tab tablet Take 220 mg by mouth every other day.    Historical Provider, MD         PHYSICAL EXAM:   /70   Pulse (!) 122   Temp 98.9 °F (37.2 °C) (Oral)   Resp (!) 22   Ht 5' 5" (1.651 m)   Wt 49.9 kg (110 lb)   LMP  (LMP Unknown)   SpO2 (!) 93%   BMI 18.30 kg/m²   Vitals Reviewed  General appearance: Well-developed, cachectic  female in no apparent distress.  Skin: No Rash. Large surgical wound to left inner thigh with wound vac sponge in place with large amount of serous drainage. Exam limited due to pain.  Neuro: Motor and sensory exams grossly intact. Good tone. Power in all 4 extremities 5/5.   HENT: Atraumatic head. Moist mucous membranes of oral cavity.  Eyes: Normal extraocular movements. PERRLa  Neck: Supple. No evidence of lymphadenopathy. No thyroidomegaly.  Lungs: Clear to auscultation bilaterally. No wheezing present.   Heart: Regular rate and rhythm. S1 and S2 present with no murmurs/gallop/rub. No pedal edema. No JVD present.   Abdomen: Soft, non-distended, non-tender. No rebound tenderness/guarding. No masses or organomegaly. Bowel sounds are normal. " Bladder is not palpable.   Extremities: No cyanosis, clubbing. Dark discoloration to BLE  Psych/mental status: Flat affect. Uncoooperative. Responds appropriately to questions.   EMERGENCY DEPARTMENT LABS AND IMAGING:     Labs Reviewed   CBC W/ AUTO DIFFERENTIAL - Abnormal; Notable for the following components:       Result Value    RBC 3.78 (*)     Hemoglobin 10.2 (*)     Hematocrit 34.3 (*)     Mean Corpuscular Hemoglobin Conc 29.7 (*)     RDW 18.5 (*)     Gran # (ANC) 8.5 (*)     Mono # 1.1 (*)     Gran% 78.9 (*)     Lymph% 9.1 (*)     All other components within normal limits   COMPREHENSIVE METABOLIC PANEL - Abnormal; Notable for the following components:    Chloride 94 (*)     Creatinine 3.9 (*)     Total Protein 8.7 (*)     Albumin 3.2 (*)     Alkaline Phosphatase 175 (*)     eGFR if  12.5 (*)     eGFR if non  10.8 (*)     All other components within normal limits   PHOSPHORUS - Abnormal; Notable for the following components:    Phosphorus 2.3 (*)     All other components within normal limits   PROTIME-INR - Abnormal; Notable for the following components:    PT 21.0 (*)     All other components within normal limits   MAGNESIUM   SARS-COV-2 RNA AMPLIFICATION, QUAL       No orders to display       ASSESSMENT & PLAN:   Left leg wound with intractable pain:  Wound vac sponge adhered to wound  Admit to Med-tele  Consult general surgery  Consult wound care for wound vac change  Prn analgesics    Apical thrombus:  Patient is scheduled for TRUPTI in the AM with Dr. Eason  Consult cardiology; plans to have TRUPTI inpatient    A-fib:  Rate   Continue coumadin and BB  INR 1.9  Daily PT/INR    ESRD on HD:  Patient reports she had HD today  Denies CP/SOB  K+ 4.8  Continue phosphate binders  Consult to Dr. Ingram for HD management if patient is not discharged home tomorrow.     DVT Prophylaxis: continue coumadin for DVT prophylaxis and will be advised to be as mobile as possible and sit  in a chair as tolerated.   ________________________________________________________________________________    Discharge Planning and Disposition: No mobility needs. Ambulating well. Patient will be discharged in 1-2 days  Face-to-Face encounter date: 07/22/2020  Encounter included review of the medical records, interviewing and examining the patient face-to-face, discussion with family and other health care providers including emergency medicine physician, admission orders, interpreting lab/test results and formulating a plan of care.   Medical Decision Making during this encounter was  [_] Low Complexity  [x_] Moderate Complexity  [ ] High Complexity  _________________________________________________________________________________    INPATIENT LIST OF MEDICATIONS     Current Facility-Administered Medications:     acetaminophen tablet 650 mg, 650 mg, Oral, Q4H PRN, Rebeca Blair NP    [START ON 7/23/2020] aspirin EC tablet 81 mg, 81 mg, Oral, Daily, Rebeca Blair NP    [START ON 7/23/2020] calcium acetate(phosphat bind) capsule 667 mg, 667 mg, Oral, Daily, Rebeca Blair NP    [START ON 7/23/2020] diltiaZEM 24 hr capsule 180 mg, 180 mg, Oral, Daily, Rebeca Blair NP    [START ON 7/23/2020] isosorbide mononitrate 24 hr tablet 30 mg, 30 mg, Oral, Daily, Rebeca Blair NP    [START ON 7/23/2020] losartan tablet 25 mg, 25 mg, Oral, Daily, Rebeca Blair NP    [START ON 7/23/2020] magnesium oxide tablet 400 mg, 400 mg, Oral, Daily, Rebeca Blair NP    [START ON 7/23/2020] metoprolol tartrate (LOPRESSOR) tablet 25 mg, 25 mg, Oral, BID, Rebeca Blair NP    ondansetron injection 4 mg, 4 mg, Intravenous, Q6H PRN, Rebeca Blair NP    oxyCODONE-acetaminophen 5-325 mg per tablet 1 tablet, 1 tablet, Oral, Q8H PRN, Rebeca Blair NP    pantoprazole EC tablet 40 mg, 40 mg, Oral, BID, Rebeca Blair NP    senna-docusate 8.6-50 mg per tablet 1 tablet, 1 tablet, Oral,  BID PRN, Rebeca Blair NP    sodium chloride 0.9% flush 10 mL, 10 mL, Intravenous, PRN, Rebeca Blair NP    traMADoL tablet 50 mg, 50 mg, Oral, Q8H PRN, Rebeca Blair NP    [START ON 7/23/2020] warfarin (COUMADIN) tablet 5 mg, 5 mg, Oral, Once, Rebeca Blair NP    [START ON 7/23/2020] zinc sulfate capsule 220 mg, 220 mg, Oral, Daily, Rebeca Blair NP    Current Outpatient Medications:     aspirin (ECOTRIN) 81 MG EC tablet, Take 81 mg by mouth once daily., Disp: , Rfl:     calcium acetate (PHOSLO) 667 mg capsule, Take 667 mg by mouth once daily. , Disp: , Rfl:     diltiaZEM (CARDIZEM CD) 180 MG 24 hr capsule, Take 180 mg by mouth once daily., Disp: , Rfl:     folic acid/vit B complex and C (JENNIFER-KODAK ORAL), Take 1 tablet by mouth once daily., Disp: , Rfl:     isosorbide mononitrate (IMDUR) 30 MG 24 hr tablet, Take 30 mg by mouth once daily., Disp: , Rfl:     losartan (COZAAR) 25 MG tablet, Take 25 mg by mouth once daily., Disp: , Rfl:     magnesium oxide (MAG-OX) 400 mg (241.3 mg magnesium) tablet, Take 400 mg by mouth once daily., Disp: , Rfl:     metoprolol tartrate (LOPRESSOR) 25 MG tablet, Take 25 mg by mouth 2 (two) times daily., Disp: , Rfl:     oxyCODONE-acetaminophen (PERCOCET) 5-325 mg per tablet, Take 1 tablet by mouth every 8 (eight) hours as needed for Pain (on dialysis days)., Disp: 21 tablet, Rfl: 0    pantoprazole (PROTONIX) 40 MG tablet, Take 40 mg by mouth 2 (two) times daily., Disp: , Rfl:     traMADoL (ULTRAM) 50 mg tablet, Take 1 tablet (50 mg total) by mouth every 8 (eight) hours as needed for Pain., Disp: 21 tablet, Rfl: 0    warfarin (COUMADIN) 3 MG tablet, Take 3 mg by mouth every evening., Disp: , Rfl:     zinc sulfate 220 mg Tab tablet, Take 220 mg by mouth every other day., Disp: , Rfl:       Scheduled Meds:   [START ON 7/23/2020] aspirin  81 mg Oral Daily    [START ON 7/23/2020] calcium acetate(phosphat bind)  667 mg Oral Daily    [START ON  7/23/2020] diltiaZEM  180 mg Oral Daily    [START ON 7/23/2020] isosorbide mononitrate  30 mg Oral Daily    [START ON 7/23/2020] losartan  25 mg Oral Daily    [START ON 7/23/2020] magnesium oxide  400 mg Oral Daily    [START ON 7/23/2020] metoprolol tartrate  25 mg Oral BID    pantoprazole  40 mg Oral BID    [START ON 7/23/2020] warfarin  5 mg Oral Once    [START ON 7/23/2020] zinc sulfate  220 mg Oral Daily     Continuous Infusions:  PRN Meds:.acetaminophen, ondansetron, oxyCODONE-acetaminophen, senna-docusate 8.6-50 mg, sodium chloride 0.9%, traMADoL      Rebeca Blair  [facility] Hospitalist  07/22/2020

## 2020-07-24 ENCOUNTER — TELEPHONE (OUTPATIENT)
Dept: FAMILY MEDICINE | Facility: CLINIC | Age: 75
End: 2020-07-24

## 2020-07-27 NOTE — PLAN OF CARE
07/27/20 1439   Final Note   Assessment Type Final Discharge Note   Anticipated Discharge Disposition Home-Health   Post-Acute Status   Post-Acute Authorization Home Health   Home Health Status Set-up Complete     Patient had Observation stay with order to resume home health with [facility].

## 2020-07-28 ENCOUNTER — DOCUMENT SCAN (OUTPATIENT)
Dept: HOME HEALTH SERVICES | Facility: HOSPITAL | Age: 75
End: 2020-07-28

## 2020-07-29 ENCOUNTER — DOCUMENT SCAN (OUTPATIENT)
Dept: HOME HEALTH SERVICES | Facility: HOSPITAL | Age: 75
End: 2020-07-29

## 2020-07-29 LAB — BSA FOR ECHO PROCEDURE: 1.51 M2

## 2020-07-30 ENCOUNTER — DOCUMENTATION ONLY (OUTPATIENT)
Dept: HOME HEALTH SERVICES | Facility: HOSPITAL | Age: 75
End: 2020-07-30

## 2020-07-30 ENCOUNTER — DOCUMENT SCAN (OUTPATIENT)
Dept: HOME HEALTH SERVICES | Facility: HOSPITAL | Age: 75
End: 2020-07-30

## 2020-08-04 ENCOUNTER — OFFICE VISIT (OUTPATIENT)
Dept: SURGERY | Facility: CLINIC | Age: 75
End: 2020-08-04
Payer: MEDICARE

## 2020-08-04 VITALS — HEIGHT: 65 IN | TEMPERATURE: 98 F | BODY MASS INDEX: 20.16 KG/M2 | WEIGHT: 121 LBS

## 2020-08-04 DIAGNOSIS — L97.922 CALCIPHYLAXIS OF LEFT LOWER EXTREMITY WITH NONHEALING ULCER WITH FAT LAYER EXPOSED: Primary | ICD-10-CM

## 2020-08-04 DIAGNOSIS — E83.59 CALCIPHYLAXIS OF LEFT LOWER EXTREMITY WITH NONHEALING ULCER WITH FAT LAYER EXPOSED: Primary | ICD-10-CM

## 2020-08-04 PROCEDURE — 99024 POSTOP FOLLOW-UP VISIT: CPT | Mod: S$GLB,,, | Performed by: SURGERY

## 2020-08-04 PROCEDURE — 99024 PR POST-OP FOLLOW-UP VISIT: ICD-10-PCS | Mod: S$GLB,,, | Performed by: SURGERY

## 2020-08-10 ENCOUNTER — DOCUMENT SCAN (OUTPATIENT)
Dept: HOME HEALTH SERVICES | Facility: HOSPITAL | Age: 75
End: 2020-08-10

## 2020-08-13 ENCOUNTER — EXTERNAL HOME HEALTH (OUTPATIENT)
Dept: HOME HEALTH SERVICES | Facility: HOSPITAL | Age: 75
End: 2020-08-13

## 2020-08-17 ENCOUNTER — ANESTHESIA (OUTPATIENT)
Dept: SURGERY | Facility: HOSPITAL | Age: 75
DRG: 377 | End: 2020-08-17
Payer: MEDICARE

## 2020-08-17 ENCOUNTER — ANESTHESIA EVENT (OUTPATIENT)
Dept: SURGERY | Facility: HOSPITAL | Age: 75
DRG: 377 | End: 2020-08-17
Payer: MEDICARE

## 2020-08-17 ENCOUNTER — HOSPITAL ENCOUNTER (INPATIENT)
Facility: HOSPITAL | Age: 75
LOS: 2 days | Discharge: HOME | DRG: 377 | End: 2020-08-19
Attending: EMERGENCY MEDICINE | Admitting: HOSPITALIST
Payer: MEDICARE

## 2020-08-17 DIAGNOSIS — Z99.2 STAGE 5 CHRONIC KIDNEY DISEASE ON CHRONIC DIALYSIS: ICD-10-CM

## 2020-08-17 DIAGNOSIS — D64.9 ANEMIA, UNSPECIFIED TYPE: Primary | ICD-10-CM

## 2020-08-17 DIAGNOSIS — K92.2 GI BLEED: ICD-10-CM

## 2020-08-17 DIAGNOSIS — R07.9 CHEST PAIN: ICD-10-CM

## 2020-08-17 DIAGNOSIS — N18.6 STAGE 5 CHRONIC KIDNEY DISEASE ON CHRONIC DIALYSIS: ICD-10-CM

## 2020-08-17 LAB
ABO + RH BLD: NORMAL
ALBUMIN SERPL BCP-MCNC: 2.4 G/DL (ref 3.5–5.2)
ALP SERPL-CCNC: 72 U/L (ref 55–135)
ALT SERPL W/O P-5'-P-CCNC: 11 U/L (ref 10–44)
ANION GAP SERPL CALC-SCNC: 14 MMOL/L (ref 8–16)
ANISOCYTOSIS BLD QL SMEAR: SLIGHT
AST SERPL-CCNC: 18 U/L (ref 10–40)
BASOPHILS NFR BLD: 0 % (ref 0–1.9)
BILIRUB SERPL-MCNC: 0.6 MG/DL (ref 0.1–1)
BLD GP AB SCN CELLS X3 SERPL QL: NORMAL
BLD PROD TYP BPU: NORMAL
BLOOD UNIT EXPIRATION DATE: NORMAL
BLOOD UNIT TYPE CODE: 1700
BLOOD UNIT TYPE CODE: 1700
BLOOD UNIT TYPE CODE: 7300
BLOOD UNIT TYPE: NORMAL
BNP SERPL-MCNC: >4500 PG/ML (ref 0–99)
BUN SERPL-MCNC: 71 MG/DL (ref 8–23)
CALCIUM SERPL-MCNC: 8.1 MG/DL (ref 8.7–10.5)
CHLORIDE SERPL-SCNC: 93 MMOL/L (ref 95–110)
CO2 SERPL-SCNC: 28 MMOL/L (ref 23–29)
CODING SYSTEM: NORMAL
CREAT SERPL-MCNC: 6.5 MG/DL (ref 0.5–1.4)
DIFFERENTIAL METHOD: ABNORMAL
DISPENSE STATUS: NORMAL
EOSINOPHIL NFR BLD: 0 % (ref 0–8)
ERYTHROCYTE [DISTWIDTH] IN BLOOD BY AUTOMATED COUNT: 16 % (ref 11.5–14.5)
ERYTHROCYTE [DISTWIDTH] IN BLOOD BY AUTOMATED COUNT: 19.7 % (ref 11.5–14.5)
ERYTHROCYTE [DISTWIDTH] IN BLOOD BY AUTOMATED COUNT: 21.7 % (ref 11.5–14.5)
EST. GFR  (AFRICAN AMERICAN): 6.7 ML/MIN/1.73 M^2
EST. GFR  (NON AFRICAN AMERICAN): 5.8 ML/MIN/1.73 M^2
GLUCOSE SERPL-MCNC: 79 MG/DL (ref 70–110)
HCT VFR BLD AUTO: 14.3 % (ref 37–48.5)
HCT VFR BLD AUTO: 19.4 % (ref 37–48.5)
HCT VFR BLD AUTO: 32.6 % (ref 37–48.5)
HGB BLD-MCNC: 11 G/DL (ref 12–16)
HGB BLD-MCNC: 4.2 G/DL (ref 12–16)
HGB BLD-MCNC: 5.9 G/DL (ref 12–16)
IMM GRANULOCYTES # BLD AUTO: ABNORMAL K/UL (ref 0–0.04)
IMM GRANULOCYTES NFR BLD AUTO: ABNORMAL % (ref 0–0.5)
INR PPP: 3.5
LYMPHOCYTES NFR BLD: 18 % (ref 18–48)
MCH RBC QN AUTO: 28.2 PG (ref 27–31)
MCH RBC QN AUTO: 29.1 PG (ref 27–31)
MCH RBC QN AUTO: 30 PG (ref 27–31)
MCHC RBC AUTO-ENTMCNC: 29.4 G/DL (ref 32–36)
MCHC RBC AUTO-ENTMCNC: 30.4 G/DL (ref 32–36)
MCHC RBC AUTO-ENTMCNC: 33.7 G/DL (ref 32–36)
MCV RBC AUTO: 89 FL (ref 82–98)
MCV RBC AUTO: 96 FL (ref 82–98)
MCV RBC AUTO: 96 FL (ref 82–98)
MONOCYTES NFR BLD: 6 % (ref 4–15)
NEUTROPHILS NFR BLD: 71 % (ref 38–73)
NEUTS BAND NFR BLD MANUAL: 5 %
NRBC BLD-RTO: 0 /100 WBC
NUM UNITS TRANS FFP: NORMAL
NUM UNITS TRANS FFP: NORMAL
NUM UNITS TRANS PACKED RBC: NORMAL
OB PNL STL: POSITIVE
PLATELET # BLD AUTO: 189 K/UL (ref 150–350)
PLATELET # BLD AUTO: 222 K/UL (ref 150–350)
PLATELET # BLD AUTO: 226 K/UL (ref 150–350)
PMV BLD AUTO: 10.3 FL (ref 9.2–12.9)
PMV BLD AUTO: 10.5 FL (ref 9.2–12.9)
PMV BLD AUTO: 10.9 FL (ref 9.2–12.9)
POIKILOCYTOSIS BLD QL SMEAR: SLIGHT
POLYCHROMASIA BLD QL SMEAR: ABNORMAL
POTASSIUM SERPL-SCNC: 6.1 MMOL/L (ref 3.5–5.1)
PROT SERPL-MCNC: 5.8 G/DL (ref 6–8.4)
PROTHROMBIN TIME: 33.9 SEC (ref 10.6–14.8)
RBC # BLD AUTO: 1.49 M/UL (ref 4–5.4)
RBC # BLD AUTO: 2.03 M/UL (ref 4–5.4)
RBC # BLD AUTO: 3.67 M/UL (ref 4–5.4)
SARS-COV-2 RDRP RESP QL NAA+PROBE: NEGATIVE
SODIUM SERPL-SCNC: 135 MMOL/L (ref 136–145)
WBC # BLD AUTO: 5.54 K/UL (ref 3.9–12.7)
WBC # BLD AUTO: 6.49 K/UL (ref 3.9–12.7)
WBC # BLD AUTO: 6.52 K/UL (ref 3.9–12.7)

## 2020-08-17 PROCEDURE — 25000003 PHARM REV CODE 250: Performed by: INTERNAL MEDICINE

## 2020-08-17 PROCEDURE — 93005 ELECTROCARDIOGRAM TRACING: CPT | Performed by: INTERNAL MEDICINE

## 2020-08-17 PROCEDURE — 20000000 HC ICU ROOM

## 2020-08-17 PROCEDURE — 63600175 PHARM REV CODE 636 W HCPCS: Performed by: HOSPITALIST

## 2020-08-17 PROCEDURE — 25000003 PHARM REV CODE 250: Performed by: HOSPITALIST

## 2020-08-17 PROCEDURE — 94761 N-INVAS EAR/PLS OXIMETRY MLT: CPT

## 2020-08-17 PROCEDURE — 99291 CRITICAL CARE FIRST HOUR: CPT

## 2020-08-17 PROCEDURE — 90935 HEMODIALYSIS ONE EVALUATION: CPT

## 2020-08-17 PROCEDURE — 85610 PROTHROMBIN TIME: CPT

## 2020-08-17 PROCEDURE — 86850 RBC ANTIBODY SCREEN: CPT

## 2020-08-17 PROCEDURE — 96375 TX/PRO/DX INJ NEW DRUG ADDON: CPT

## 2020-08-17 PROCEDURE — 63600175 PHARM REV CODE 636 W HCPCS: Performed by: NURSE ANESTHETIST, CERTIFIED REGISTERED

## 2020-08-17 PROCEDURE — 83880 ASSAY OF NATRIURETIC PEPTIDE: CPT

## 2020-08-17 PROCEDURE — U0002 COVID-19 LAB TEST NON-CDC: HCPCS

## 2020-08-17 PROCEDURE — 82272 OCCULT BLD FECES 1-3 TESTS: CPT

## 2020-08-17 PROCEDURE — 27000656 HC EYE GOGGLES: Performed by: STUDENT IN AN ORGANIZED HEALTH CARE EDUCATION/TRAINING PROGRAM

## 2020-08-17 PROCEDURE — 36430 TRANSFUSION BLD/BLD COMPNT: CPT

## 2020-08-17 PROCEDURE — 86920 COMPATIBILITY TEST SPIN: CPT

## 2020-08-17 PROCEDURE — 36415 COLL VENOUS BLD VENIPUNCTURE: CPT

## 2020-08-17 PROCEDURE — 85007 BL SMEAR W/DIFF WBC COUNT: CPT

## 2020-08-17 PROCEDURE — 85027 COMPLETE CBC AUTOMATED: CPT

## 2020-08-17 PROCEDURE — 25000242 PHARM REV CODE 250 ALT 637 W/ HCPCS: Performed by: HOSPITALIST

## 2020-08-17 PROCEDURE — 37000009 HC ANESTHESIA EA ADD 15 MINS: Performed by: INTERNAL MEDICINE

## 2020-08-17 PROCEDURE — 27000221 HC OXYGEN, UP TO 24 HOURS

## 2020-08-17 PROCEDURE — 27202049 HC PROBE, APC ERBE: Performed by: INTERNAL MEDICINE

## 2020-08-17 PROCEDURE — 27201107 HC STYLET, STANDARD: Performed by: STUDENT IN AN ORGANIZED HEALTH CARE EDUCATION/TRAINING PROGRAM

## 2020-08-17 PROCEDURE — 96374 THER/PROPH/DIAG INJ IV PUSH: CPT

## 2020-08-17 PROCEDURE — 43255 EGD CONTROL BLEEDING ANY: CPT | Performed by: INTERNAL MEDICINE

## 2020-08-17 PROCEDURE — P9017 PLASMA 1 DONOR FRZ W/IN 8 HR: HCPCS

## 2020-08-17 PROCEDURE — 85027 COMPLETE CBC AUTOMATED: CPT | Mod: 91

## 2020-08-17 PROCEDURE — 37000008 HC ANESTHESIA 1ST 15 MINUTES: Performed by: INTERNAL MEDICINE

## 2020-08-17 PROCEDURE — P9016 RBC LEUKOCYTES REDUCED: HCPCS

## 2020-08-17 PROCEDURE — C9113 INJ PANTOPRAZOLE SODIUM, VIA: HCPCS | Performed by: EMERGENCY MEDICINE

## 2020-08-17 PROCEDURE — 80053 COMPREHEN METABOLIC PANEL: CPT

## 2020-08-17 PROCEDURE — 63600175 PHARM REV CODE 636 W HCPCS: Performed by: EMERGENCY MEDICINE

## 2020-08-17 PROCEDURE — C9113 INJ PANTOPRAZOLE SODIUM, VIA: HCPCS | Performed by: HOSPITALIST

## 2020-08-17 PROCEDURE — 25000003 PHARM REV CODE 250: Performed by: NURSE ANESTHETIST, CERTIFIED REGISTERED

## 2020-08-17 PROCEDURE — 63600175 PHARM REV CODE 636 W HCPCS: Performed by: INTERNAL MEDICINE

## 2020-08-17 PROCEDURE — 94640 AIRWAY INHALATION TREATMENT: CPT

## 2020-08-17 PROCEDURE — 25000003 PHARM REV CODE 250: Performed by: EMERGENCY MEDICINE

## 2020-08-17 RX ORDER — POLYETHYLENE GLYCOL 3350 17 G/17G
17 POWDER, FOR SOLUTION ORAL DAILY
Status: DISCONTINUED | OUTPATIENT
Start: 2020-08-17 | End: 2020-08-19 | Stop reason: HOSPADM

## 2020-08-17 RX ORDER — ONDANSETRON 2 MG/ML
4 INJECTION INTRAMUSCULAR; INTRAVENOUS EVERY 6 HOURS PRN
Status: DISCONTINUED | OUTPATIENT
Start: 2020-08-17 | End: 2020-08-19 | Stop reason: HOSPADM

## 2020-08-17 RX ORDER — WARFARIN SODIUM 5 MG/1
5 TABLET ORAL DAILY
Status: ON HOLD | COMMUNITY
End: 2020-08-19 | Stop reason: HOSPADM

## 2020-08-17 RX ORDER — ACETAMINOPHEN 325 MG/1
650 TABLET ORAL EVERY 4 HOURS PRN
Status: DISCONTINUED | OUTPATIENT
Start: 2020-08-17 | End: 2020-08-19 | Stop reason: HOSPADM

## 2020-08-17 RX ORDER — IPRATROPIUM BROMIDE AND ALBUTEROL SULFATE 2.5; .5 MG/3ML; MG/3ML
3 SOLUTION RESPIRATORY (INHALATION) EVERY 8 HOURS
Status: DISCONTINUED | OUTPATIENT
Start: 2020-08-17 | End: 2020-08-18

## 2020-08-17 RX ORDER — DIPHENHYDRAMINE HYDROCHLORIDE 50 MG/ML
25 INJECTION INTRAMUSCULAR; INTRAVENOUS EVERY 4 HOURS
Status: DISCONTINUED | OUTPATIENT
Start: 2020-08-17 | End: 2020-08-17

## 2020-08-17 RX ORDER — HYDROCODONE BITARTRATE AND ACETAMINOPHEN 5; 325 MG/1; MG/1
1 TABLET ORAL EVERY 6 HOURS PRN
Status: DISCONTINUED | OUTPATIENT
Start: 2020-08-17 | End: 2020-08-19 | Stop reason: HOSPADM

## 2020-08-17 RX ORDER — PANTOPRAZOLE SODIUM 40 MG/10ML
40 INJECTION, POWDER, LYOPHILIZED, FOR SOLUTION INTRAVENOUS
Status: COMPLETED | OUTPATIENT
Start: 2020-08-17 | End: 2020-08-17

## 2020-08-17 RX ORDER — CALCIUM ACETATE 667 MG/1
667 CAPSULE ORAL DAILY
Status: DISCONTINUED | OUTPATIENT
Start: 2020-08-17 | End: 2020-08-19 | Stop reason: HOSPADM

## 2020-08-17 RX ORDER — HYDROCODONE BITARTRATE AND ACETAMINOPHEN 500; 5 MG/1; MG/1
TABLET ORAL
Status: DISCONTINUED | OUTPATIENT
Start: 2020-08-17 | End: 2020-08-17

## 2020-08-17 RX ORDER — DIPHENHYDRAMINE HYDROCHLORIDE 50 MG/ML
25 INJECTION INTRAMUSCULAR; INTRAVENOUS EVERY 4 HOURS PRN
Status: DISCONTINUED | OUTPATIENT
Start: 2020-08-17 | End: 2020-08-19 | Stop reason: HOSPADM

## 2020-08-17 RX ORDER — PROPOFOL 10 MG/ML
VIAL (ML) INTRAVENOUS
Status: DISCONTINUED | OUTPATIENT
Start: 2020-08-17 | End: 2020-08-17

## 2020-08-17 RX ORDER — AMOXICILLIN 250 MG
1 CAPSULE ORAL 2 TIMES DAILY
Status: DISCONTINUED | OUTPATIENT
Start: 2020-08-17 | End: 2020-08-19 | Stop reason: HOSPADM

## 2020-08-17 RX ORDER — SODIUM CHLORIDE 0.9 % (FLUSH) 0.9 %
10 SYRINGE (ML) INJECTION
Status: DISCONTINUED | OUTPATIENT
Start: 2020-08-17 | End: 2020-08-19 | Stop reason: HOSPADM

## 2020-08-17 RX ORDER — LIDOCAINE HYDROCHLORIDE 20 MG/ML
INJECTION, SOLUTION EPIDURAL; INFILTRATION; INTRACAUDAL; PERINEURAL
Status: DISCONTINUED | OUTPATIENT
Start: 2020-08-17 | End: 2020-08-17

## 2020-08-17 RX ORDER — LANOLIN ALCOHOL/MO/W.PET/CERES
400 CREAM (GRAM) TOPICAL DAILY
Status: DISCONTINUED | OUTPATIENT
Start: 2020-08-17 | End: 2020-08-19 | Stop reason: HOSPADM

## 2020-08-17 RX ORDER — HYDROMORPHONE HYDROCHLORIDE 1 MG/ML
0.5 INJECTION, SOLUTION INTRAMUSCULAR; INTRAVENOUS; SUBCUTANEOUS
Status: COMPLETED | OUTPATIENT
Start: 2020-08-17 | End: 2020-08-17

## 2020-08-17 RX ORDER — METOPROLOL TARTRATE 25 MG/1
25 TABLET, FILM COATED ORAL 2 TIMES DAILY
Status: DISCONTINUED | OUTPATIENT
Start: 2020-08-18 | End: 2020-08-19 | Stop reason: HOSPADM

## 2020-08-17 RX ORDER — DILTIAZEM HYDROCHLORIDE 180 MG/1
180 CAPSULE, COATED, EXTENDED RELEASE ORAL DAILY
Status: DISCONTINUED | OUTPATIENT
Start: 2020-08-17 | End: 2020-08-19 | Stop reason: HOSPADM

## 2020-08-17 RX ORDER — ONDANSETRON 2 MG/ML
4 INJECTION INTRAMUSCULAR; INTRAVENOUS EVERY 8 HOURS PRN
Status: DISCONTINUED | OUTPATIENT
Start: 2020-08-17 | End: 2020-08-17

## 2020-08-17 RX ORDER — HYDROCODONE BITARTRATE AND ACETAMINOPHEN 500; 5 MG/1; MG/1
TABLET ORAL
Status: DISCONTINUED | OUTPATIENT
Start: 2020-08-17 | End: 2020-08-19 | Stop reason: HOSPADM

## 2020-08-17 RX ORDER — WARFARIN 2.5 MG/1
2.5 TABLET ORAL DAILY
Status: ON HOLD | COMMUNITY
End: 2020-08-19 | Stop reason: SDUPTHER

## 2020-08-17 RX ORDER — MORPHINE SULFATE 2 MG/ML
2 INJECTION, SOLUTION INTRAMUSCULAR; INTRAVENOUS EVERY 4 HOURS PRN
Status: DISCONTINUED | OUTPATIENT
Start: 2020-08-17 | End: 2020-08-19 | Stop reason: HOSPADM

## 2020-08-17 RX ORDER — SODIUM THIOSULFATE 250 MG/ML
0.25 INJECTION, SOLUTION INTRAVENOUS
Status: DISCONTINUED | OUTPATIENT
Start: 2020-08-17 | End: 2020-08-19 | Stop reason: HOSPADM

## 2020-08-17 RX ORDER — ISOSORBIDE MONONITRATE 30 MG/1
30 TABLET, EXTENDED RELEASE ORAL DAILY
Status: DISCONTINUED | OUTPATIENT
Start: 2020-08-17 | End: 2020-08-19 | Stop reason: HOSPADM

## 2020-08-17 RX ADMIN — ONDANSETRON 4 MG: 2 INJECTION INTRAMUSCULAR; INTRAVENOUS at 12:08

## 2020-08-17 RX ADMIN — SODIUM THIOSULFATE 0.25 G: 250 INJECTION, SOLUTION INTRAVENOUS at 04:08

## 2020-08-17 RX ADMIN — PANTOPRAZOLE SODIUM 40 MG: 40 INJECTION, POWDER, LYOPHILIZED, FOR SOLUTION INTRAVENOUS at 09:08

## 2020-08-17 RX ADMIN — CALCIUM ACETATE 667 MG: 667 CAPSULE ORAL at 05:08

## 2020-08-17 RX ADMIN — IPRATROPIUM BROMIDE AND ALBUTEROL SULFATE 3 ML: .5; 3 SOLUTION RESPIRATORY (INHALATION) at 04:08

## 2020-08-17 RX ADMIN — PROPOFOL 50 MG: 10 INJECTION, EMULSION INTRAVENOUS at 12:08

## 2020-08-17 RX ADMIN — HYDROMORPHONE HYDROCHLORIDE 0.5 MG: 1 INJECTION, SOLUTION INTRAMUSCULAR; INTRAVENOUS; SUBCUTANEOUS at 08:08

## 2020-08-17 RX ADMIN — DILTIAZEM HYDROCHLORIDE 180 MG: 180 CAPSULE, COATED, EXTENDED RELEASE ORAL at 05:08

## 2020-08-17 RX ADMIN — LIDOCAINE HYDROCHLORIDE 100 MG: 20 INJECTION, SOLUTION EPIDURAL; INFILTRATION; INTRACAUDAL; PERINEURAL at 12:08

## 2020-08-17 RX ADMIN — PROPOFOL 50 MG: 10 INJECTION, EMULSION INTRAVENOUS at 01:08

## 2020-08-17 RX ADMIN — MAGNESIUM OXIDE 400 MG: 400 TABLET ORAL at 05:08

## 2020-08-17 RX ADMIN — PHYTONADIONE 10 MG: 10 INJECTION, EMULSION INTRAMUSCULAR; INTRAVENOUS; SUBCUTANEOUS at 10:08

## 2020-08-17 RX ADMIN — DEXTROSE 8 MG/HR: 50 INJECTION, SOLUTION INTRAVENOUS at 08:08

## 2020-08-17 RX ADMIN — MORPHINE SULFATE 2 MG: 2 INJECTION, SOLUTION INTRAMUSCULAR; INTRAVENOUS at 03:08

## 2020-08-17 RX ADMIN — EPOETIN ALFA-EPBX 6100 UNITS: 10000 INJECTION, SOLUTION INTRAVENOUS; SUBCUTANEOUS at 05:08

## 2020-08-17 RX ADMIN — IPRATROPIUM BROMIDE AND ALBUTEROL SULFATE 3 ML: .5; 3 SOLUTION RESPIRATORY (INHALATION) at 07:08

## 2020-08-17 RX ADMIN — HYDROCODONE BITARTRATE AND ACETAMINOPHEN 1 TABLET: 5; 325 TABLET ORAL at 08:08

## 2020-08-17 RX ADMIN — SENNOSIDES AND DOCUSATE SODIUM 1 TABLET: 8.6; 5 TABLET ORAL at 08:08

## 2020-08-17 RX ADMIN — SODIUM CHLORIDE 500 ML: 0.9 INJECTION, SOLUTION INTRAVENOUS at 01:08

## 2020-08-17 NOTE — ED PROVIDER NOTES
Encounter Date: 8/17/2020       History     Chief Complaint   Patient presents with    Hematemesis     this am.. and complains of L leg pain from a wound     74-year-old female has a history of hypertension, chronic kidney disease, peripheral vascular disease, CHF and anemia, presents emergency room with complaints of vomiting blood this morning.  After leaving being in the emergency room the patient started bleeding rectally.  She is on warfarin but states she had taken in 2 days.  She denies any dizziness or syncope.  Patient is aneuric.  She is due for dialysis today.  No complaints of any dizziness.  She does complain of intense pain to her left medial thigh where she has had a wound for an extended period of time.  No fever or chills.        Review of patient's allergies indicates:  No Known Allergies  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    both legs    CHF (congestive heart failure)     Encounter for blood transfusion 03/2016    Gout     Hemodialysis access, AV graft     Hypertension     Mitral valve regurgitation     Osteoarthritis     Pancreatitis     Peripheral vascular disease     Pneumonia 09/09/2017    Renal failure      Past Surgical History:   Procedure Laterality Date    ACHILLES TENDON SURGERY Right     ANGIOGRAPHY OF ARTERIOVENOUS SHUNT Right 1/7/2020    Procedure: Fistulogram with Possible Intervention;  Surgeon: Joseph Loyola MD;  Location: [facility] CATH/EP LAB;  Service: Cardiology;  Laterality: Right;    ANGIOGRAPHY OF LOWER EXTREMITY Left 3/18/2020    Procedure: Angiogram Extremity Unilateral;  Surgeon: Ali Khoobehi, MD;  Location: [facility] CATH/EP LAB;  Service: Cardiology;  Laterality: Left;    APPENDECTOMY      CARDIAC CATHETERIZATION  07/03/2017    CHOLECYSTECTOMY      heal surgery Right     HYSTERECTOMY      INSERTION OF STENT INTO PERIPHERAL VESSEL N/A 1/7/2020    Procedure: INSERTION, STENT, VESSEL, PERIPHERAL;  Surgeon: Joseph PENN  MD Nir;  Location: [facility] CATH/EP LAB;  Service: Cardiology;  Laterality: N/A;    MITRAL VALVE REPLACEMENT      PARATHYROIDECTOMY      PARATHYROIDECTOMY  07/13/2017    PERCUTANEOUS TRANSLUMINAL ANGIOPLASTY OF ARTERIOVENOUS FISTULA N/A 1/7/2020    Procedure: PTA, AV FISTULA;  Surgeon: Joseph Loyola MD;  Location: [facility] CATH/EP LAB;  Service: Cardiology;  Laterality: N/A;    PERITONEAL CATHETER INSERTION      RENAL BIOPSY      vocal cord nodule      WOUND DEBRIDEMENT Left 7/13/2020    Procedure: DEBRIDEMENT, WOUND;  Surgeon: Carlos Elam III, MD;  Location: [facility] OR;  Service: General;  Laterality: Left;     Family History   Problem Relation Age of Onset    Heart disease Sister     Early death Sister         heart as baby    Heart disease Maternal Grandfather     Diabetes Mother     Hypertension Mother     Heart failure Mother     Heart disease Mother     Arthritis Mother     Diabetes Father     Early death Sister         infant     Social History     Tobacco Use    Smoking status: Current Some Day Smoker     Packs/day: 0.50     Years: 45.00     Pack years: 22.50     Types: Cigarettes    Smokeless tobacco: Never Used   Substance Use Topics    Alcohol use: No    Drug use: No     Review of Systems   Constitutional: Negative for appetite change, chills, diaphoresis and fever.   HENT: Negative for nosebleeds, rhinorrhea, sore throat and trouble swallowing.    Eyes: Negative for visual disturbance.   Respiratory: Positive for shortness of breath.    Cardiovascular: Negative for chest pain.   Gastrointestinal: Positive for anal bleeding and blood in stool. Negative for abdominal pain, constipation, diarrhea, nausea and vomiting.   Genitourinary:        Aneuric   Musculoskeletal: Positive for myalgias. Negative for back pain.   Skin: Positive for wound.   Neurological: Negative for dizziness, syncope, weakness and light-headedness.   Hematological: Does not bruise/bleed easily.   All other  systems reviewed and are negative.      Physical Exam     Initial Vitals [08/17/20 0728]   BP Pulse Resp Temp SpO2   139/63 110 20 97.7 °F (36.5 °C) --      MAP       --         Physical Exam    Vitals reviewed.  Constitutional: She appears well-developed and well-nourished. She is not diaphoretic. She appears distressed.   HENT:   Head: Normocephalic and atraumatic.   Nose: Nose normal.   Mouth/Throat: Oropharynx is clear and moist. No oropharyngeal exudate.   Eyes: Conjunctivae are normal. Pupils are equal, round, and reactive to light. Right eye exhibits no discharge. Left eye exhibits no discharge. No scleral icterus.   Neck: Normal range of motion. Neck supple. No thyromegaly present. No tracheal deviation present. No JVD present.   Cardiovascular: Normal rate, normal heart sounds and intact distal pulses. Exam reveals no gallop and no friction rub.    No murmur heard.  Pulmonary/Chest: Breath sounds normal. No stridor. No respiratory distress. She has no wheezes. She has no rhonchi. She has no rales.   Abdominal: Soft. Bowel sounds are normal. She exhibits no distension. There is no abdominal tenderness. There is no rebound and no guarding.   Musculoskeletal: Normal range of motion. No tenderness or edema.      Comments: Peripheral pulses not palpable   Lymphadenopathy:     She has no cervical adenopathy.   Neurological: She is alert and oriented to person, place, and time. She has normal strength. No cranial nerve deficit. GCS score is 15. GCS eye subscore is 4. GCS verbal subscore is 5. GCS motor subscore is 6.   Skin: Skin is warm and dry. Capillary refill takes less than 2 seconds. No rash noted. No erythema. No pallor.   Psychiatric: She has a normal mood and affect. Her behavior is normal. Judgment and thought content normal.         ED Course   Critical Care    Date/Time: 8/17/2020 11:16 AM  Performed by: Kenji Sanford Jr., MD  Authorized by: Cristian Lopez MD   Direct patient critical care  time: 20 minutes  Ordering / reviewing critical care time: 5 minutes  Documentation critical care time: 5 minutes  Consulting other physicians critical care time: 5 minutes  Total critical care time (exclusive of procedural time) : 35 minutes        Labs Reviewed - No data to display       Imaging Results    None                       Attending Attestation:             Attending ED Notes:   This 74-year-old dialysis patient presented with bleeding, both upper and lower who has an H&H of 4 and 14.  Chemistry showed as expected her poor renal function.  The PT INR 3.5.  During the ED course the patient was typed and screen in orders were placed for 2 units of blood and FFP.  She also received 10 mg of vitamin K IV.  During the ED course hospital Medicine is consulted Dr. Lopez will be admitting the patient to ICU.  CLARE Barclay was also consulted.  Dr. Ingram her nephrologist was notified of her presentation.  The patient's EKG showed a atrial fib rhythm.  Rate was controlled at 1:05 a.m..  Patient's blood pressure maintain a systolic  around 100.  Peripheral IVs established.                          Clinical Impression:       ICD-10-CM ICD-9-CM   1. Anemia, unspecified type  D64.9 285.9   2. GI bleed  K92.2 578.9   3. Stage 5 chronic kidney disease on chronic dialysis  N18.6 585.6    Z99.2 V45.11                                Kenji Sanford Jr., MD  08/17/20 1022       Kenji Sanford Jr., MD  08/17/20 1139

## 2020-08-17 NOTE — ANESTHESIA POSTPROCEDURE EVALUATION
Anesthesia Post Evaluation    Patient: Griselda Neely    Procedure(s) Performed: Procedure(s) (LRB):  EGD (ESOPHAGOGASTRODUODENOSCOPY) (Left)    Final Anesthesia Type: general    Patient location during evaluation: PACU  Patient participation: Yes- Able to Participate  Level of consciousness: responds to stimulation and awake  Post-procedure vital signs: reviewed and stable  Pain management: adequate  Airway patency: patent  LÓPEZ mitigation strategies: Extubation while patient is awake and Extubation and recovery carried out in lateral, semiupright, or other nonsupine position  PONV status at discharge: No PONV  Anesthetic complications: no      Cardiovascular status: hemodynamically stable  Respiratory status: nasal cannula  Hydration status: euvolemic  Follow-up not needed.  Comments: Patient being released from PACU to ICU as bed is now ready          Vitals Value Taken Time   /96 08/17/20 1401   Temp 97 08/17/20 1413   Pulse 97 08/17/20 1412   Resp 20 08/17/20 1412   SpO2 94% 08/17/20 1412   Vitals shown include unvalidated device data.      No case tracking events are documented in the log.      Pain/Skip Score: Pain Rating Prior to Med Admin: 10 (8/17/2020  8:36 AM)  Skip Score: 8 (8/17/2020  1:46 PM)

## 2020-08-17 NOTE — CONSULTS
[facility]  Nephrology  Consult Note    Patient Name: Griselda Neely  MRN: [id number]  Admission Date: 8/17/2020  Hospital Length of Stay: 0 days  Attending Provider: Cristian Lopez MD   Primary Care Physician: Kalie Neely MD  Principal Problem:<principal problem not specified>    Consults ESRD  Subjective:     HPI:  Griselda Neely is 74-year-old female accompanied by sister who presents to ED for evaluation of vomiting blood after eating breakfast.  PMHx including hypertension, chronic kidney disease, peripheral vascular disease, MVR, CHF, AF, and anemia.  Rectal  bleeding noted in ED. HGB 4.2.  Denies lightheadedness/dizziness.  She takes Coumadin daily.  Last dose two days ago. She is ESRD followed by Dr. Ingram.  [facility]  She has a right arm AVF.  Last dialyzed on Friday.  Currently treated for Calciphylaxis.  Chronic left upper thigh wound.  Nephrology consulted for ESRD.     She is seen and examined in ED and soon en-route to Endoscopy. Finishing up 1 st unit of PRBC and 3 more to follow.   HD to follow,  once stabilized.       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    both legs    CHF (congestive heart failure)     Encounter for blood transfusion 03/2016    Gout     Hemodialysis access, AV graft     Hypertension     Mitral valve regurgitation     Osteoarthritis     Pancreatitis     Peripheral vascular disease     Pneumonia 09/09/2017    Renal failure        Past Surgical History:   Procedure Laterality Date    ACHILLES TENDON SURGERY Right     ANGIOGRAPHY OF ARTERIOVENOUS SHUNT Right 1/7/2020    Procedure: Fistulogram with Possible Intervention;  Surgeon: Joseph Loyola MD;  Location: [facility] CATH/EP LAB;  Service: Cardiology;  Laterality: Right;    ANGIOGRAPHY OF LOWER EXTREMITY Left 3/18/2020    Procedure: Angiogram Extremity Unilateral;  Surgeon: Ali Khoobehi, MD;  Location: [facility] CATH/EP LAB;  Service:  Cardiology;  Laterality: Left;    APPENDECTOMY      CARDIAC CATHETERIZATION  07/03/2017    CHOLECYSTECTOMY      heal surgery Right     HYSTERECTOMY      INSERTION OF STENT INTO PERIPHERAL VESSEL N/A 1/7/2020    Procedure: INSERTION, STENT, VESSEL, PERIPHERAL;  Surgeon: Joseph Loyola MD;  Location: [facility] CATH/EP LAB;  Service: Cardiology;  Laterality: N/A;    MITRAL VALVE REPLACEMENT      PARATHYROIDECTOMY      PARATHYROIDECTOMY  07/13/2017    PERCUTANEOUS TRANSLUMINAL ANGIOPLASTY OF ARTERIOVENOUS FISTULA N/A 1/7/2020    Procedure: PTA, AV FISTULA;  Surgeon: Joseph Loyola MD;  Location: [facility] CATH/EP LAB;  Service: Cardiology;  Laterality: N/A;    PERITONEAL CATHETER INSERTION      RENAL BIOPSY      vocal cord nodule      WOUND DEBRIDEMENT Left 7/13/2020    Procedure: DEBRIDEMENT, WOUND;  Surgeon: Carlos Elam III, MD;  Location: [facility] OR;  Service: General;  Laterality: Left;       Review of patient's allergies indicates:  No Known Allergies  Current Facility-Administered Medications   Medication Frequency    0.9%  NaCl infusion (for blood administration) Q24H PRN    0.9%  NaCl infusion (for blood administration) Q24H PRN    acetaminophen tablet 650 mg Q4H PRN    albuterol-ipratropium 2.5 mg-0.5 mg/3 mL nebulizer solution 3 mL Q8H    [START ON 8/18/2020] calcium acetate(phosphat bind) capsule 667 mg Daily    [START ON 8/18/2020] diltiaZEM 24 hr capsule 180 mg Daily    HYDROcodone-acetaminophen 5-325 mg per tablet 1 tablet Q6H PRN    [START ON 8/18/2020] isosorbide mononitrate 24 hr tablet 30 mg Daily    [START ON 8/18/2020] magnesium oxide tablet 400 mg Daily    [START ON 8/18/2020] metoprolol tartrate (LOPRESSOR) tablet 25 mg BID    morphine injection 2 mg Q4H PRN    ondansetron injection 4 mg Q8H PRN    pantoprazole (PROTONIX) 40 mg in dextrose 5 % 100 mL infusion Continuous    polyethylene glycol packet 17 g Daily    promethazine (PHENERGAN) 6.25 mg in dextrose 5 % 50 mL IVPB  Q6H PRN    senna-docusate 8.6-50 mg per tablet 1 tablet BID    sodium chloride 0.9% flush 10 mL PRN     Current Outpatient Medications   Medication    aspirin (ECOTRIN) 81 MG EC tablet    calcium acetate (PHOSLO) 667 mg capsule    diltiaZEM (CARDIZEM CD) 180 MG 24 hr capsule    folic acid/vit B complex and C (JENNIFER-KODAK ORAL)    isosorbide mononitrate (IMDUR) 30 MG 24 hr tablet    losartan (COZAAR) 25 MG tablet    magnesium oxide (MAG-OX) 400 mg (241.3 mg magnesium) tablet    metoprolol tartrate (LOPRESSOR) 25 MG tablet    pantoprazole (PROTONIX) 40 MG tablet    warfarin (COUMADIN) 2.5 MG tablet    warfarin (COUMADIN) 5 MG tablet    zinc sulfate 220 mg Tab tablet     Family History     Problem Relation (Age of Onset)    Arthritis Mother    Diabetes Mother, Father    Early death Sister, Sister    Heart disease Sister, Maternal Grandfather, Mother    Heart failure Mother    Hypertension Mother        Tobacco Use    Smoking status: Current Some Day Smoker     Packs/day: 0.50     Years: 45.00     Pack years: 22.50     Types: Cigarettes    Smokeless tobacco: Never Used   Substance and Sexual Activity    Alcohol use: No    Drug use: No    Sexual activity: Not on file     Review of Systems   Constitutional: Negative for chills and fever.   HENT: Negative.    Respiratory: Positive for cough. Negative for shortness of breath, wheezing and stridor.         Home O2   Cardiovascular: Negative.    Gastrointestinal: Positive for blood in stool, nausea and vomiting. Negative for abdominal distention, abdominal pain, constipation and diarrhea.   Musculoskeletal: Positive for arthralgias.   Skin: Positive for wound (left upper thigh; tx for calciphylaxis. ).   Neurological: Positive for weakness. Negative for dizziness, light-headedness, numbness and headaches.     Objective:     Vital Signs (Most Recent):  Temp: 97.9 °F (36.6 °C) (08/17/20 1219)  Pulse: 94 (08/17/20 1219)  Resp: 16 (08/17/20 1219)  BP: (!) 103/50  (08/17/20 1233)  SpO2: (!) 90 % (08/17/20 1200)  O2 Device (Oxygen Therapy): nasal cannula (08/17/20 1233) Vital Signs (24h Range):  Temp:  [97.1 °F (36.2 °C)-97.9 °F (36.6 °C)] 97.9 °F (36.6 °C)  Pulse:  [] 94  Resp:  [16-25] 16  SpO2:  [71 %-91 %] 90 %  BP: ()/(44-77) 103/50     Weight: 54.4 kg (120 lb) (08/17/20 0728)  Body mass index is 23.44 kg/m².  Body surface area is 1.52 meters squared.    No intake/output data recorded.    Physical Exam  Vitals signs and nursing note reviewed.   Constitutional:       General: She is not in acute distress.     Appearance: She is ill-appearing.   HENT:      Head: Normocephalic and atraumatic.      Comments: No JVD     Mouth/Throat:      Mouth: Mucous membranes are moist.   Eyes:      Extraocular Movements: Extraocular movements intact.      Conjunctiva/sclera: Conjunctivae normal.      Pupils: Pupils are equal, round, and reactive to light.   Neck:      Musculoskeletal: Normal range of motion and neck supple.   Cardiovascular:      Rate and Rhythm: Tachycardia present. Rhythm irregular.      Comments: A-fib  Pulmonary:      Effort: Pulmonary effort is normal. No respiratory distress.      Breath sounds: Normal breath sounds.   Abdominal:      General: Bowel sounds are normal. There is no distension.      Palpations: Abdomen is soft.      Tenderness: There is no abdominal tenderness.   Musculoskeletal: Normal range of motion.      Right lower leg: No edema.      Left lower leg: No edema.   Skin:     Findings: Rash (dry skin; hyperpigmented; B U&L Ext's) present.      Comments: Right arm:  AVF +bruit/thrill      Left upper thigh wound covered with clean and dry dressing; chronic wound secondary to Calciphylaxis   Neurological:      Mental Status: She is alert and oriented to person, place, and time.         Significant Labs:  BMP:   Recent Labs   Lab 08/17/20  0835   GLU 79   CL 93*   CO2 28   BUN 71*   CREATININE 6.5*   CALCIUM 8.1*     Cardiac Markers: No results  for input(s): CKMB, TROPONINT, MYOGLOBIN in the last 168 hours.  CBC:   Recent Labs   Lab 08/17/20  1217   WBC 6.52   RBC 2.03*   HGB 5.9*   HCT 19.4*      MCV 96   MCH 29.1   MCHC 30.4*     CMP:   Recent Labs   Lab 08/17/20  0835   GLU 79   CALCIUM 8.1*   ALBUMIN 2.4*   PROT 5.8*   *   K 6.1*   CO2 28   CL 93*   BUN 71*   CREATININE 6.5*   ALKPHOS 72   ALT 11   AST 18   BILITOT 0.6     Coagulation:   Recent Labs   Lab 08/17/20  0835   INR 3.5     All labs within the past 24 hours have been reviewed.    Significant Imaging:      Assessment/Plan:     Active Diagnoses:    Diagnosis Date Noted POA    GI bleed [K92.2] 08/17/2020 Yes    Calciphylaxis [E83.59] 06/05/2020 Yes    Peripheral vascular disease now with left lower extremity occlusion [I70.90] 03/14/2020 Yes    Chronic respiratory failure [J96.10] 05/13/2019 Yes     Chronic    Non-rheumatic mitral regurgitation [I34.0] 07/25/2017 Yes    Other persistent atrial fibrillation [I48.19] 07/18/2017 Yes    Coronary arteriosclerosis in native artery [I25.10] 04/04/2017 Yes    ESRD (end stage renal disease) on HD M,W, F [N18.6] 05/11/2016 Yes     Chronic      Problems Resolved During this Admission:       Assessment and Plan:      1.  ESRD- HD M-W-F schedule; Plan is for Dialysis today once stable;  Potassium 6.1;  Low k/phos diet, strict I&O, daily weight, renal dose all meds appropriate GFR, No BP/IV stick right arm.      2.  Anemia- HGB 4.2>5.9 after one unit PRBC; 3 PRBC to follow. Blood vomit/stool Currently in endoscopy.  Following  GI recommendations.      3. Renal BMD- Calcium corrects 9.4 for low albumin; awaiting phos; taking Ca Acetate; currently treated for Calciphylaxis with Sodium Thiosulfate.      4.  Hypertension-   Currently hypotensive; Hold BP meds while hypotensive;   goal SBP <140>90; titrate as needed.    5.  GI Bleed- Defer to GI      Thank you for your consult. I will follow-up with patient. Please contact us if you have any  additional questions.    Gabe Barksdale NP  Nephrology  [facility]

## 2020-08-17 NOTE — ANESTHESIA PREPROCEDURE EVALUATION
08/17/2020  Griselda Neely is a 74 y.o., female.  Patient Active Problem List   Diagnosis    HTN (hypertension)    PND (paroxysmal nocturnal dyspnea)    Tobacco dependence    ESRD (end stage renal disease) on HD M,W, F    Hyperparathyroidism    Other persistent atrial fibrillation    Anemia due to chronic kidney disease    Pulmonary hypertension    Non-rheumatic mitral regurgitation    DNR (do not resuscitate)    Chronic respiratory failure    Coronary arteriosclerosis in native artery    Gastroesophageal reflux disease    S/P parathyroidectomy    Tension-type headache    Hyperparathyroidism due to renal insufficiency    Functional dyspepsia    HLD (hyperlipidemia)    PVD (peripheral vascular disease)    Encephalopathy    Chronic systolic heart failure    Peripheral vascular disease now with left lower extremity occlusion    H/O mitral valve replacement    Gout    Anemia    Chronic pain on chronic narcotics    Left leg pain    Atrial fibrillation with RVR    Acute on chronic respiratory failure    Volume overload    Calciphylaxis    Encephalopathy chronic    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    Malnutrition of moderate degree    Visit for wound check    GI bleed       Past Surgical History:   Procedure Laterality Date    ACHILLES TENDON SURGERY Right     ANGIOGRAPHY OF ARTERIOVENOUS SHUNT Right 1/7/2020    Procedure: Fistulogram with Possible Intervention;  Surgeon: Joseph Loyola MD;  Location: [facility] CATH/EP LAB;  Service: Cardiology;  Laterality: Right;    ANGIOGRAPHY OF LOWER EXTREMITY Left 3/18/2020    Procedure: Angiogram Extremity Unilateral;  Surgeon: Ali Khoobehi, MD;  Location: [facility] CATH/EP LAB;  Service: Cardiology;  Laterality: Left;    APPENDECTOMY      CARDIAC CATHETERIZATION  07/03/2017     CHOLECYSTECTOMY      heal surgery Right     HYSTERECTOMY      INSERTION OF STENT INTO PERIPHERAL VESSEL N/A 1/7/2020    Procedure: INSERTION, STENT, VESSEL, PERIPHERAL;  Surgeon: Joseph Loyola MD;  Location: [facility] CATH/EP LAB;  Service: Cardiology;  Laterality: N/A;    MITRAL VALVE REPLACEMENT      PARATHYROIDECTOMY      PARATHYROIDECTOMY  07/13/2017    PERCUTANEOUS TRANSLUMINAL ANGIOPLASTY OF ARTERIOVENOUS FISTULA N/A 1/7/2020    Procedure: PTA, AV FISTULA;  Surgeon: Joseph Loyola MD;  Location: [facility] CATH/EP LAB;  Service: Cardiology;  Laterality: N/A;    PERITONEAL CATHETER INSERTION      RENAL BIOPSY      vocal cord nodule      WOUND DEBRIDEMENT Left 7/13/2020    Procedure: DEBRIDEMENT, WOUND;  Surgeon: Carlos Elam III, MD;  Location: [facility] OR;  Service: General;  Laterality: Left;        Tobacco Use:  The patient  reports that she has been smoking cigarettes. She has a 22.50 pack-year smoking history. She has never used smokeless tobacco.     Results for orders placed or performed during the hospital encounter of 08/17/20   EKG 12-lead    Collection Time: 08/17/20  8:14 AM    Narrative    Test Reason : K92.2,    Vent. Rate : 105 BPM     Atrial Rate : 300 BPM     P-R Int : 000 ms          QRS Dur : 092 ms      QT Int : 336 ms       P-R-T Axes : 000 -37 098 degrees     QTc Int : 444 ms    Atrial flutter with variable A-V block  Left axis deviation  Abnormal QRS-T angle, consider primary T wave abnormality  Abnormal ECG  When compared with ECG of 22-JUL-2020 19:13,  Atrial flutter has replaced Atrial fibrillation    Referred By: [email or web address]   SELF           Confirmed By:              Lab Results   Component Value Date    WBC 5.54 08/17/2020    HGB 4.2 (LL) 08/17/2020    HCT 14.3 (LL) 08/17/2020    MCV 96 08/17/2020     08/17/2020     BMP  Lab Results   Component Value Date     (L) 08/17/2020    K 6.1 (H) 08/17/2020    CL 93 (L) 08/17/2020    CO2 28 08/17/2020    BUN 71 (H)  08/17/2020    CREATININE 6.5 (H) 08/17/2020    CALCIUM 8.1 (L) 08/17/2020    ANIONGAP 14 08/17/2020    ESTGFRAFRICA 6.7 (A) 08/17/2020    EGFRNONAA 5.8 (A) 08/17/2020       Echocardiography Findings     Left Ventricle Severe decreased ejection fraction at 25%. Eccentric hypertrophy observed. Grade IV (severe) left ventricular diastolic dysfunction. Elevated left atrial pressure.   Right Ventricle The right ventricle is moderately dilated. The systolic function is moderately reduced.   Left Atrium There is severe left atrial enlargement. Layered thrombus suggested. The thrombus is fixed and located in the inferior cavity.   Right Atrium There is moderate right atrial enlargement.   Aortic Valve Aortic valve sclerosis is moderate. The LVOT diameter is 2.09 cm.   Mitral Valve Trace regurgitation. There is a bioprosthetic mitral valve. The prosthetic valve is normal.   Tricuspid Valve Moderate regurgitation. Mild to moderate pulmonary hypertension present.   Pulmonic Valve Mild to moderate regurgitation.   Pericardium Small posterior pericardial effusion. No cardiac tamponade present.       Pre-op Assessment    I have reviewed the Patient Summary Reports.     I have reviewed the Nursing Notes. I have reviewed the NPO Status.   I have reviewed the Medications.     Review of Systems  Anesthesia Hx:  No problems with previous Anesthesia  History of prior surgery of interest to airway management or planning: Previous anesthesia: General Denies Family Hx of Anesthesia complications.   Denies Personal Hx of Anesthesia complications.   Social:  Smoker, No Alcohol Use    Hematology/Oncology:         -- Anemia: Hematology Comments: Emesis on admission, H/H: 4.2/14.3    Cardiovascular:   Hypertension, poorly controlled Valvular problems/Murmurs, MR CAD  Dysrhythmias atrial fibrillation CHF Orthopnea PND PVD hyperlipidemia ARGUELLO ECG has been reviewed. Hx. Lt. Atrial Thrombus   Diastolic Heart Failure   EF 25% Chronic CHF   Pulmonary  HTN   Pulmonary:   Pneumonia COPD, severe Shortness of breath Home O2 3L/min   Renal/:   Chronic Renal Disease, Dialysis, CRI, ESRD Dialysis M,W,F; vomitting blood at HD this am, did not have session, transferred to ED    Hepatic/GI:   GERD Emesis this admit    Musculoskeletal:   Arthritis  Left Leg Wound   Neurological:   Headaches Seizures    Endocrine:  Endocrine Normal Hypoglycemia Denies Thyroid Disease  Parathyroid Disease, Hyperparathyroidism, s/p parathyroidectomy Hyperparathyroidism secondary to renal insufficiency          Physical Exam  General:  Malnutrition    Airway/Jaw/Neck:  Airway Findings: Mouth Opening: Normal General Airway Assessment: Adult  Mallampati: II      Dental:  Dental Findings:   Chest/Lungs:  Chest/Lungs Findings: (bibasilar crackles) Decreased Breath Sounds Bilateral, Tachypnea     Heart/Vascular:  Heart Findings: Rate: Normal  Rhythm: Irregularly Irregular        Mental Status:  Mental Status Findings:  Cooperative, Alert and Oriented         Anesthesia Plan  Type of Anesthesia, risks & benefits discussed:  Anesthesia Type:  general  Patient's Preference: MAC  Intra-op Monitoring Plan: standard ASA monitors  Intra-op Monitoring Plan Comments:   Post Op Pain Control Plan: per primary service following discharge from PACU  Post Op Pain Control Plan Comments:   Induction:   IV  Beta Blocker:  Patient is on a Beta-Blocker and has received one dose within the past 24 hours (No further documentation required).       Informed Consent: Patient understands risks and agrees with Anesthesia plan.  Questions answered. Anesthesia consent signed with patient.  ASA Score: 4  emergent   Day of Surgery Review of History & Physical:        Anesthesia Plan Notes: GETA RSI  s/p 1u PRBC, will  tranfuse another unit intraop,   Potassium 6.1 this am, no Succ,  Zofran ppx             Ready For Surgery From Anesthesia Perspective.

## 2020-08-17 NOTE — PHARMACY MED REC
[place] pharmacy tech from [id number] stated that pt has prescription for warfarin 2.5mg and 5mg. She did not see rx strength for 3mg.    Pt's sister states that pt is taking warfarin 2.5mg and 5mg but is not sure of the dosage. Pt was informed to hold warfarin Thursday thru Sunday due to elevated INR

## 2020-08-17 NOTE — PLAN OF CARE
Pt brought to recovery to wait on bed availability in ICU.  S/p EGD and repair of small bowel avm.  Crackles noted in bilateral lung bases.  Respirations are mildly labored.  Pt due for dialysis now.

## 2020-08-17 NOTE — ED NOTES
Patient identifiers for Griselda Neely checked and correct.  LOC: Patient is awake, alert, and aware of environment with an appropriate affect. Patient is oriented x 3 and speaking appropriately.  APPEARANCE: Patient is complaining of L leg pain , vomiting blood and is in mild distress. Patient is clean and well groomed, patient's clothing is properly fastened.  SKIN: The skin is warm and dry. Patient has poor skin turgor and moist mucus membrances. Wound to L upper leg  MUSKULOSKELETAL: Patient is moving all extremities , wound noted to L upper leg with swelling to L lower leg. Pulses intact.   RESPIRATORY: Airway is open and patent. Respirations are spontaneous and non-labored with normal effort and rate. Uses o2 at home per NC  CARDIAC: Patient has a normal rate and rhythm. No peripheral edema noted. Capillary refill < 3 seconds. Dialysis access to R upper arm  ABDOMEN: No distention noted. Bowel sounds active in all 4 quadrants. Soft and non-tender upon palpation.  NEUROLOGICAL: PERRL. Facial expression is symmetrical. Hand grasps are equal bilaterally. Normal sensation in all extremities when touched with finger.  Allergies reported:   Review of patient's allergies indicates:  No Known Allergies

## 2020-08-17 NOTE — PLAN OF CARE
Plan of care reviewed with patient and family. Time allowed for questions and concerns to be addressed. Will continue to monitor.

## 2020-08-17 NOTE — TRANSFER OF CARE
Anesthesia Transfer of Care Note    Patient: Griselda Neely    Procedure(s) Performed: Procedure(s) (LRB):  EGD (ESOPHAGOGASTRODUODENOSCOPY) (Left)    Patient location: PACU    Anesthesia Type: general    Transport from OR: Transported from OR on room air with adequate spontaneous ventilation    Post pain: adequate analgesia    Post assessment: no apparent anesthetic complications    Post vital signs: stable    Level of consciousness: awake and alert    Nausea/Vomiting: no nausea/vomiting    Complications: none    Transfer of care protocol was followed      Last vitals:   Visit Vitals  BP (!) 103/50   Pulse 94   Temp 36.6 °C (97.9 °F) (Oral)   Resp 16   Ht 5' (1.524 m)   Wt 54.4 kg (120 lb)   LMP  (LMP Unknown)   SpO2 (!) 90%   Breastfeeding No   BMI 23.44 kg/m²

## 2020-08-17 NOTE — H&P
[facility] Medicine  History & Physical    DOS: 08/17/2020 at 10:20am    Patient Name: Griselda Neely  MRN: [id number]  Admission Date: 8/17/2020  Attending Physician: Cristian Lopez MD   Primary Care Provider: Kalie Neely MD         Patient information was obtained from patient, past medical records and ER records.     Subjective:     Principal Problem:GI bleed    Chief Complaint:   Chief Complaint   Patient presents with    Hematemesis     this am.. and complains of L leg pain from a wound        HPI: Griselda Neely is a 74 y.o. Black or  female who  has a past medical history of Anemia, Calciphylaxis (07/2017), CHF (congestive heart failure), Encounter for blood transfusion (03/2016), Gout, Hemodialysis access, AV graft, Hypertension, Mitral valve regurgitation, Osteoarthritis, Pancreatitis, Peripheral vascular disease, Pneumonia (09/09/2017), and Renal failure.. The patient presented to [facility] on 7/22/2020 with a primary complaint of Wound Check (Referred here for removal of adhered wound vac dressing, home health unable to remove, L leg) who came from dialysis unit due to hematemesis.  Upon further asking patient mentioned that she was in her usual state of health until 5:00 a.m. today when she started throwing up blood however she decided to go to her dialysis unit and she had another episode of being hematemesis and dialysis was aborted and was sent to ED.  Of note she takes Coumadin. She reports mild dizziness otherwise denies any new symptoms.     In ED patient was found to be profoundly anemic, hypotensive, tachycardic and her CBC showed hemoglobin of 4.2 with INR of 3.5.  GI, Nephrology was consulted stat.  She received vitamin K, 2 units FFP and when I was interviewing she was getting 2nd unit of blood.  She was hypotensive during my interview however appears completely asymptomatic.     Past Medical History:   Diagnosis Date     Anemia     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    both legs    CHF (congestive heart failure)     Encounter for blood transfusion 03/2016    Gout     Hemodialysis access, AV graft     Hypertension     Mitral valve regurgitation     Osteoarthritis     Pancreatitis     Peripheral vascular disease     Pneumonia 09/09/2017    Renal failure        Past Surgical History:   Procedure Laterality Date    ACHILLES TENDON SURGERY Right     ANGIOGRAPHY OF ARTERIOVENOUS SHUNT Right 1/7/2020    Procedure: Fistulogram with Possible Intervention;  Surgeon: Joseph Loyola MD;  Location: [facility] CATH/EP LAB;  Service: Cardiology;  Laterality: Right;    ANGIOGRAPHY OF LOWER EXTREMITY Left 3/18/2020    Procedure: Angiogram Extremity Unilateral;  Surgeon: Ali Khoobehi, MD;  Location: [facility] CATH/EP LAB;  Service: Cardiology;  Laterality: Left;    APPENDECTOMY      CARDIAC CATHETERIZATION  07/03/2017    CHOLECYSTECTOMY      heal surgery Right     HYSTERECTOMY      INSERTION OF STENT INTO PERIPHERAL VESSEL N/A 1/7/2020    Procedure: INSERTION, STENT, VESSEL, PERIPHERAL;  Surgeon: Joseph Loyola MD;  Location: [facility] CATH/EP LAB;  Service: Cardiology;  Laterality: N/A;    MITRAL VALVE REPLACEMENT      PARATHYROIDECTOMY      PARATHYROIDECTOMY  07/13/2017    PERCUTANEOUS TRANSLUMINAL ANGIOPLASTY OF ARTERIOVENOUS FISTULA N/A 1/7/2020    Procedure: PTA, AV FISTULA;  Surgeon: Joseph Loyola MD;  Location: [facility] CATH/EP LAB;  Service: Cardiology;  Laterality: N/A;    PERITONEAL CATHETER INSERTION      RENAL BIOPSY      vocal cord nodule      WOUND DEBRIDEMENT Left 7/13/2020    Procedure: DEBRIDEMENT, WOUND;  Surgeon: Carlos Elam III, MD;  Location: [facility] OR;  Service: General;  Laterality: Left;       Review of patient's allergies indicates:  No Known Allergies    No current facility-administered medications on file prior to encounter.      Current Outpatient Medications on File Prior to Encounter   Medication Sig    aspirin  (ECOTRIN) 81 MG EC tablet Take 81 mg by mouth once daily.    calcium acetate (PHOSLO) 667 mg capsule Take 667 mg by mouth once daily.     diltiaZEM (CARDIZEM CD) 180 MG 24 hr capsule Take 180 mg by mouth once daily.    folic acid/vit B complex and C (JENNIFER-KODAK ORAL) Take 1 tablet by mouth once daily.    isosorbide mononitrate (IMDUR) 30 MG 24 hr tablet Take 30 mg by mouth once daily.    losartan (COZAAR) 25 MG tablet Take 25 mg by mouth once daily.    magnesium oxide (MAG-OX) 400 mg (241.3 mg magnesium) tablet Take 1 tablet by mouth once daily    metoprolol tartrate (LOPRESSOR) 25 MG tablet Take 25 mg by mouth 2 (two) times daily.    pantoprazole (PROTONIX) 40 MG tablet Take 40 mg by mouth 2 (two) times daily.    warfarin (COUMADIN) 2.5 MG tablet Take 2.5 mg by mouth once daily. Dose unknown ON HOLD SINCE 8/13    warfarin (COUMADIN) 5 MG tablet Take 5 mg by mouth once daily. Unsure of dosage ON HOLD SINCE 813    zinc sulfate 220 mg Tab tablet Take 220 mg by mouth every other day.    [DISCONTINUED] JENNIFER-KODAK RX 1- mg-mg-mcg Tab Take 1 tablet by mouth once daily    [DISCONTINUED] warfarin (COUMADIN) 3 MG tablet Take 3 mg by mouth every evening.     Family History     Problem Relation (Age of Onset)    Arthritis Mother    Diabetes Mother, Father    Early death Sister, Sister    Heart disease Sister, Maternal Grandfather, Mother    Heart failure Mother    Hypertension Mother        Tobacco Use    Smoking status: Current Some Day Smoker     Packs/day: 0.50     Years: 45.00     Pack years: 22.50     Types: Cigarettes    Smokeless tobacco: Never Used   Substance and Sexual Activity    Alcohol use: No    Drug use: No    Sexual activity: Not on file     Review of Systems   Constitutional: Positive for diaphoresis and fatigue. Negative for chills and fever.   HENT: Negative for sore throat.    Eyes: Negative for redness and itching.   Respiratory: Positive for shortness of breath. Negative for chest  tightness.    Cardiovascular: Negative for chest pain and palpitations.   Gastrointestinal: Positive for blood in stool. Negative for nausea.        Hematemesis   Genitourinary: Negative for dysuria.   Musculoskeletal: Positive for arthralgias and back pain. Negative for gait problem.   Skin: Positive for color change and wound.   Neurological: Positive for dizziness and weakness. Negative for tremors.   Psychiatric/Behavioral: Negative for behavioral problems and sleep disturbance.   All other systems reviewed and are negative.    Objective:     Vital Signs (Most Recent):  Temp: 97.9 °F (36.6 °C) (08/17/20 1219)  Pulse: 94 (08/17/20 1219)  Resp: 16 (08/17/20 1219)  BP: (!) 103/50 (08/17/20 1233)  SpO2: (!) 90 % (08/17/20 1200) Vital Signs (24h Range):  Temp:  [97.1 °F (36.2 °C)-97.9 °F (36.6 °C)] 97.9 °F (36.6 °C)  Pulse:  [] 94  Resp:  [16-25] 16  SpO2:  [71 %-91 %] 90 %  BP: ()/(44-77) 103/50     Weight: 54.4 kg (120 lb)  Body mass index is 23.44 kg/m².    Physical Exam  Vitals signs and nursing note reviewed.   Constitutional:       General: She is in acute distress.      Appearance: She is well-developed. She is ill-appearing and diaphoretic.   HENT:      Head: Atraumatic.      Mouth/Throat:      Comments: Dry oral mucosa  Neck:      Musculoskeletal: Normal range of motion and neck supple.      Vascular: No JVD.   Cardiovascular:      Rate and Rhythm: Regular rhythm. Tachycardia present.      Heart sounds: Normal heart sounds. No murmur. No gallop.    Pulmonary:      Effort: Pulmonary effort is normal. No respiratory distress.      Breath sounds: Normal breath sounds. No wheezing.      Comments: Mild tachypnea, on supplemental oxygen  Abdominal:      General: Abdomen is flat. Bowel sounds are normal. There is no distension.      Palpations: Abdomen is soft.      Tenderness: There is no guarding or rebound.      Comments: Mild right upper quadrant tenderness   Musculoskeletal: Normal range of motion.       Comments: Chronic skin changes in lower extremities   Lymphadenopathy:      Cervical: No cervical adenopathy.   Skin:     General: Skin is warm.      Capillary Refill: Capillary refill takes less than 2 seconds.      Findings: No rash.   Neurological:      Mental Status: She is alert and oriented to person, place, and time.      Cranial Nerves: No cranial nerve deficit.   Psychiatric:         Behavior: Behavior normal.             Significant Labs: All pertinent labs within the past 24 hours have been reviewed.    Significant Imaging: I have reviewed all pertinent imaging results/findings within the past 24 hours.    Assessment/Plan:     74-year-old lady with numerous cardio renal comorbidities including CHF, AFib, ESRD on hemodialysis, a mitral valve replacement, numerous hospital admissions came from dialysis unit due to hematemesis found to be profoundly anemic and hypotensive in ED.    Assessment:  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]  Yes    Anemia [D64.9]  Yes     Chronic    H/O mitral valve replacement [Z95.2]  Not Applicable     Chronic    Peripheral vascular disease now with left lower extremity occlusion [I70.90]  Yes    DNR (do not resuscitate) [Z66]  Yes     Chronic    Chronic respiratory failure [J96.10]  Yes     Chronic     3 L O2 at home      Non-rheumatic mitral regurgitation [I34.0]  Yes    Other persistent atrial fibrillation [I48.19]  Yes    Coronary arteriosclerosis in native artery [I25.10]  Yes    ESRD (end stage renal disease) on HD M,W, F [N18.6]  Yes     Chronic      Resolved Hospital Problems   No resolved problems to display.       Plan:  Admit to ICU-inpatient    In ED 2 units FFP, 2 units blood, vitamin K was ordered.  Will transfuse further with dialysis    Consulted GI who is planning to scope her soon    Hold aspirin and Coumadin    Consult Dr. Ingram for dialysis    NPO    Protonix bolus followed by  javier    Consult Dr. Powell    Further management as per endoscopic findings    Patient remains critically ill and she clearly expressed to me as well as family member that she is DNR    DNR and DNI  VTE Risk Mitigation (From admission, onward)         Ordered     Reason for No Pharmacological VTE Prophylaxis  Once     Question:  Reasons:  Answer:  Active Bleeding    08/17/20 1030     IP VTE HIGH RISK PATIENT  Once      08/17/20 1030     Place sequential compression device  Until discontinued      08/17/20 1030                   Cristian Lopez MD  Department of Hospital Medicine   Critical access hospital

## 2020-08-17 NOTE — CONSULTS
[facility]  Cardiology  Consult Note    Patient Name: Griselda Neely  MRN: [id number]  Admission Date: 8/17/2020  Hospital Length of Stay: 0 days  Code Status: DNR   Attending Provider: Cristian Lopez MD   Consulting Provider: Katharina Powell MD  Primary Care Physician: Kalie Neely MD  Principal Problem:GI bleed    Patient information was obtained from patient, past medical records and ER records.     Inpatient consult to Cardiology  Consult performed by: Katharina Powell MD  Consult ordered by: Cristian Lopez MD        Subjective:     REASON FOR CONSULT:  YOUNG thrombus, atrial fibrillation, CHF     HPI:  74-year-old female with a past medical history significant for persistent atrial fibrillation, left atrial/left atrial appendage thrombus, status post mitral valve replacement, end-stage renal disease on hemodialysis presented to the hospital after she reportedly had vomited blood.  She reportedly had an episode of hematemesis earlier this morning.  She went to dialysis and had another episode dialysis.  She was subsequently sent to the emergency room for further evaluation and management.  She reportedly had an EGD and at the time of my interview the report was pending.  Per verbal report it showed AV malformations.  In the emergency room she was found to be profoundly anemic and she received packed RBC transfusion.  She currently denies any chest pain, shortness of breath, palpitations.    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    both legs    CHF (congestive heart failure)     Encounter for blood transfusion 03/2016    Gout     Hemodialysis access, AV graft     Hypertension     Mitral valve regurgitation     Osteoarthritis     Pancreatitis     Peripheral vascular disease     Pneumonia 09/09/2017    Renal failure        Past Surgical History:   Procedure Laterality Date    ACHILLES TENDON SURGERY Right     ANGIOGRAPHY OF  ARTERIOVENOUS SHUNT Right 1/7/2020    Procedure: Fistulogram with Possible Intervention;  Surgeon: Joseph Loyola MD;  Location: [facility] CATH/EP LAB;  Service: Cardiology;  Laterality: Right;    ANGIOGRAPHY OF LOWER EXTREMITY Left 3/18/2020    Procedure: Angiogram Extremity Unilateral;  Surgeon: Ali Khoobehi, MD;  Location: [facility] CATH/EP LAB;  Service: Cardiology;  Laterality: Left;    APPENDECTOMY      CARDIAC CATHETERIZATION  07/03/2017    CHOLECYSTECTOMY      heal surgery Right     HYSTERECTOMY      INSERTION OF STENT INTO PERIPHERAL VESSEL N/A 1/7/2020    Procedure: INSERTION, STENT, VESSEL, PERIPHERAL;  Surgeon: Joseph Loyola MD;  Location: [facility] CATH/EP LAB;  Service: Cardiology;  Laterality: N/A;    MITRAL VALVE REPLACEMENT      PARATHYROIDECTOMY      PARATHYROIDECTOMY  07/13/2017    PERCUTANEOUS TRANSLUMINAL ANGIOPLASTY OF ARTERIOVENOUS FISTULA N/A 1/7/2020    Procedure: PTA, AV FISTULA;  Surgeon: Joseph Loyola MD;  Location: [facility] CATH/EP LAB;  Service: Cardiology;  Laterality: N/A;    PERITONEAL CATHETER INSERTION      RENAL BIOPSY      vocal cord nodule      WOUND DEBRIDEMENT Left 7/13/2020    Procedure: DEBRIDEMENT, WOUND;  Surgeon: Carlos Elam III, MD;  Location: [facility] OR;  Service: General;  Laterality: Left;       Review of patient's allergies indicates:  No Known Allergies    No current facility-administered medications on file prior to encounter.      Current Outpatient Medications on File Prior to Encounter   Medication Sig    aspirin (ECOTRIN) 81 MG EC tablet Take 81 mg by mouth once daily.    calcium acetate (PHOSLO) 667 mg capsule Take 667 mg by mouth once daily.     diltiaZEM (CARDIZEM CD) 180 MG 24 hr capsule Take 180 mg by mouth once daily.    folic acid/vit B complex and C (JENNIFER-KODAK ORAL) Take 1 tablet by mouth once daily.    isosorbide mononitrate (IMDUR) 30 MG 24 hr tablet Take 30 mg by mouth once daily.    losartan (COZAAR) 25 MG tablet Take 25 mg by mouth  once daily.    magnesium oxide (MAG-OX) 400 mg (241.3 mg magnesium) tablet Take 1 tablet by mouth once daily    metoprolol tartrate (LOPRESSOR) 25 MG tablet Take 25 mg by mouth 2 (two) times daily.    pantoprazole (PROTONIX) 40 MG tablet Take 40 mg by mouth 2 (two) times daily.    warfarin (COUMADIN) 2.5 MG tablet Take 2.5 mg by mouth once daily. Dose unknown ON HOLD SINCE 8/13    warfarin (COUMADIN) 5 MG tablet Take 5 mg by mouth once daily. Unsure of dosage ON HOLD SINCE 813    zinc sulfate 220 mg Tab tablet Take 220 mg by mouth every other day.    [DISCONTINUED] JENNIFER-KODAK RX 1- mg-mg-mcg Tab Take 1 tablet by mouth once daily    [DISCONTINUED] warfarin (COUMADIN) 3 MG tablet Take 3 mg by mouth every evening.       Scheduled Meds:   albuterol-ipratropium  3 mL Nebulization Q8H    calcium acetate(phosphat bind)  667 mg Oral Daily    diltiaZEM  180 mg Oral Daily    diphenhydrAMINE  25 mg Intravenous Q4H    isosorbide mononitrate  30 mg Oral Daily    magnesium oxide  400 mg Oral Daily    [START ON 8/18/2020] metoprolol tartrate  25 mg Oral BID    polyethylene glycol  17 g Oral Daily    senna-docusate 8.6-50 mg  1 tablet Oral BID    sodium thiosulfate  0.25 g Intravenous Three Times Weekly     Continuous Infusions:   pantoprozole (PROTONIX) IV infusion 8 mg/hr (08/17/20 1119)     PRN Meds:.sodium chloride, acetaminophen, HYDROcodone-acetaminophen, morphine, ondansetron, promethazine (PHENERGAN) IVPB, promethazine (PHENERGAN) IVPB, sodium chloride 0.9%    Family History     Problem Relation (Age of Onset)    Arthritis Mother    Diabetes Mother, Father    Early death Sister, Sister    Heart disease Sister, Maternal Grandfather, Mother    Heart failure Mother    Hypertension Mother          Tobacco Use    Smoking status: Current Some Day Smoker     Packs/day: 0.50     Years: 45.00     Pack years: 22.50     Types: Cigarettes    Smokeless tobacco: Never Used   Substance and Sexual Activity     Alcohol use: No    Drug use: No    Sexual activity: Not on file       ROS   No significant headaches or sore throat or runny nose.   No recent changes in vision.   No recent changes in hearing.  No dysphagia or odynophagia.  Denies chest pain and shortness of breath.   Denies any cough or hemoptysis.   Denies any abdominal pain.   Denies any fevers or chills.   Denies any recent significant weight changes.   Denies bleeding diathesis    Objective:     Vital Signs (Most Recent):  Temp: 98 °F (36.7 °C) (08/17/20 1528)  Pulse: (!) 119 (08/17/20 1528)  Resp: 20 (08/17/20 1528)  BP: (!) 80/57 (08/17/20 1515)  SpO2: (!) 67 % (08/17/20 1515) Vital Signs (24h Range):  Temp:  [97.1 °F (36.2 °C)-98 °F (36.7 °C)] 98 °F (36.7 °C)  Pulse:  [] 119  Resp:  [16-29] 20  SpO2:  [67 %-100 %] 67 %  BP: ()/() 80/57     Weight: 61.4 kg (135 lb 5.8 oz)  Body mass index is 26.44 kg/m².    SpO2: (!) 67 %  O2 Device (Oxygen Therapy): nasal cannula      Intake/Output Summary (Last 24 hours) at 8/17/2020 1557  Last data filed at 8/17/2020 1406  Gross per 24 hour   Intake 963 ml   Output --   Net 963 ml       Lines/Drains/Airways     Drain                 Hemodialysis AV Fistula 08/08/19 0214 Right upper arm 375 days          Peripheral Intravenous Line                 Peripheral IV - Single Lumen 08/17/20 0805 20 G Left Wrist less than 1 day         Peripheral IV - Single Lumen 08/17/20 0900 20 G Left Upper Arm less than 1 day                Physical Exam  HEENT: Normocephalic, atraumatic, PERRL, Conjunctiva pink, no scleral icterus.   CVS: S1S2+, iRRR, no murmurs, rubs or gallops, JVP: Normal.  LUNGS: Clear  ABDOMEN: Soft, NT, BS+  EXTREMITIES: No cyanosis, clubbing. + edema  NEURO: AAO X 3.       Significant Labs:   BMP:   Recent Labs   Lab 08/17/20  0835   GLU 79   *   K 6.1*   CL 93*   CO2 28   BUN 71*   CREATININE 6.5*   CALCIUM 8.1*   , CMP   Recent Labs   Lab 08/17/20  0835   *   K 6.1*   CL 93*   CO2 28    GLU 79   BUN 71*   CREATININE 6.5*   CALCIUM 8.1*   PROT 5.8*   ALBUMIN 2.4*   BILITOT 0.6   ALKPHOS 72   AST 18   ALT 11   ANIONGAP 14   ESTGFRAFRICA 6.7*   EGFRNONAA 5.8*   , CBC   Recent Labs   Lab 08/17/20  0835 08/17/20  1217   WBC 5.54 6.52   HGB 4.2* 5.9*   HCT 14.3* 19.4*    222   , INR   Recent Labs   Lab 08/17/20  0835   INR 3.5   , Lipid Panel No results for input(s): CHOL, HDL, LDLCALC, TRIG, CHOLHDL in the last 48 hours. and Troponin No results for input(s): TROPONINI in the last 48 hours.    Significant Imaging: Reviewed  Assessment and Plan:     IMPRESSION:  Severe Anemia. Acute on chronic.     GI bleed.     Congestive heart failure. Reduced LV systolic function.  Euvolemic on exam.    Mitral regurgitation.  Severe.  With severe calcification.  Not a candidate for percutaneous interventions.  After discussion with Dr. Lora, she decided to be treated only with medical management. Newly reduced LVEF to 35-40% on 4/11/2019. Status post mitral valve replacement with a size 25 mm [employer] bovine pericardial prosthesis via right thoracotomy on by Dr. Lora 3/21/2019.     Atrial fibrillation.  Persistent. Currently in atrial fibrillation with controlled ventricular response by exam.  On coumadin with supratherapeutic INR.      End-stage renal disease on hemodialysis.  MWF. Follows with Dr. Ingram.      Tobacco abuse.  Ongoing despite counseling.    Left atrial thrombus.     CODE STATUS was addressed on 7/16/2020 and the patient was alert, oriented ×3.  She preferred to be a DNI.  She would like to be resuscitated. CODE STATUS was addressed with the presence of her sister and son.       RECOMMENDATIONS:    1.  Continue to hold therapeutic anticoagulation.  2.  Serial H&H and transfuse as needed.  3.  If blood pressure is stable we will resume Cardizem and Toprol for better rate control.  4.  Discussed with Dr. Lopez.      Thank you for your consult. I will follow-up with patient.  Please contact us if you have any additional questions.    Katharina Powell MD  Cardiology   Critical access hospital

## 2020-08-17 NOTE — HPI
Griselda Neely is a 74 y.o. Black or  female who  has a past medical history of Anemia, Calciphylaxis (07/2017), CHF (congestive heart failure), Encounter for blood transfusion (03/2016), Gout, Hemodialysis access, AV graft, Hypertension, Mitral valve regurgitation, Osteoarthritis, Pancreatitis, Peripheral vascular disease, Pneumonia (09/09/2017), and Renal failure.. The patient presented to [facility] on 7/22/2020 with a primary complaint of Wound Check (Referred here for removal of adhered wound vac dressing, home health unable to remove, L leg) who came from dialysis unit due to hematemesis.  Upon further asking patient mentioned that she was in her usual state of health until 5:00 a.m. today when she started throwing up blood however she decided to go to her dialysis unit and she had another episode of being hematemesis and dialysis was aborted and was sent to ED.  Of note she takes Coumadin. She reports mild dizziness otherwise denies any new symptoms.     In ED patient was found to be profoundly anemic, hypotensive, tachycardic and her CBC showed hemoglobin of 4.2 with INR of 3.5.  GI, Nephrology was consulted stat.  She received vitamin K, 2 units FFP and when I was interviewing she was getting 2nd unit of blood.  She was hypotensive during my interview however appears completely asymptomatic.

## 2020-08-18 LAB
ALBUMIN SERPL BCP-MCNC: 2.8 G/DL (ref 3.5–5.2)
ALP SERPL-CCNC: 72 U/L (ref 55–135)
ALT SERPL W/O P-5'-P-CCNC: 12 U/L (ref 10–44)
ANION GAP SERPL CALC-SCNC: 14 MMOL/L (ref 8–16)
APTT PPP: 32.6 SEC (ref 23.6–33.3)
AST SERPL-CCNC: 21 U/L (ref 10–40)
BASOPHILS # BLD AUTO: 0.02 K/UL (ref 0–0.2)
BASOPHILS NFR BLD: 0.3 % (ref 0–1.9)
BILIRUB SERPL-MCNC: 0.9 MG/DL (ref 0.1–1)
BUN SERPL-MCNC: 40 MG/DL (ref 8–23)
CALCIUM SERPL-MCNC: 7.4 MG/DL (ref 8.7–10.5)
CHLORIDE SERPL-SCNC: 90 MMOL/L (ref 95–110)
CO2 SERPL-SCNC: 25 MMOL/L (ref 23–29)
CREAT SERPL-MCNC: 4.6 MG/DL (ref 0.5–1.4)
DIFFERENTIAL METHOD: ABNORMAL
EOSINOPHIL # BLD AUTO: 0.1 K/UL (ref 0–0.5)
EOSINOPHIL NFR BLD: 1.7 % (ref 0–8)
ERYTHROCYTE [DISTWIDTH] IN BLOOD BY AUTOMATED COUNT: 16.1 % (ref 11.5–14.5)
ERYTHROCYTE [DISTWIDTH] IN BLOOD BY AUTOMATED COUNT: 17.6 % (ref 11.5–14.5)
EST. GFR  (AFRICAN AMERICAN): 10.2 ML/MIN/1.73 M^2
EST. GFR  (NON AFRICAN AMERICAN): 8.9 ML/MIN/1.73 M^2
GLUCOSE SERPL-MCNC: 111 MG/DL (ref 70–110)
GLUCOSE SERPL-MCNC: 321 MG/DL (ref 70–110)
GLUCOSE SERPL-MCNC: 79 MG/DL (ref 70–110)
GLUCOSE SERPL-MCNC: 89 MG/DL (ref 70–110)
HCT VFR BLD AUTO: 28.6 % (ref 37–48.5)
HCT VFR BLD AUTO: 31.6 % (ref 37–48.5)
HGB BLD-MCNC: 10.5 G/DL (ref 12–16)
HGB BLD-MCNC: 9.9 G/DL (ref 12–16)
IMM GRANULOCYTES # BLD AUTO: 0.03 K/UL (ref 0–0.04)
IMM GRANULOCYTES NFR BLD AUTO: 0.5 % (ref 0–0.5)
INR PPP: 1.2
LYMPHOCYTES # BLD AUTO: 0.9 K/UL (ref 1–4.8)
LYMPHOCYTES NFR BLD: 15.4 % (ref 18–48)
MAGNESIUM SERPL-MCNC: 1.9 MG/DL (ref 1.6–2.6)
MCH RBC QN AUTO: 30.2 PG (ref 27–31)
MCH RBC QN AUTO: 30.6 PG (ref 27–31)
MCHC RBC AUTO-ENTMCNC: 33.2 G/DL (ref 32–36)
MCHC RBC AUTO-ENTMCNC: 34.6 G/DL (ref 32–36)
MCV RBC AUTO: 87 FL (ref 82–98)
MCV RBC AUTO: 92 FL (ref 82–98)
MONOCYTES # BLD AUTO: 0.4 K/UL (ref 0.3–1)
MONOCYTES NFR BLD: 7.6 % (ref 4–15)
NEUTROPHILS # BLD AUTO: 4.3 K/UL (ref 1.8–7.7)
NEUTROPHILS NFR BLD: 74.5 % (ref 38–73)
NRBC BLD-RTO: 0 /100 WBC
PHOSPHATE SERPL-MCNC: 2.1 MG/DL (ref 2.7–4.5)
PLATELET # BLD AUTO: 183 K/UL (ref 150–350)
PLATELET # BLD AUTO: 208 K/UL (ref 150–350)
PMV BLD AUTO: 10.2 FL (ref 9.2–12.9)
PMV BLD AUTO: 10.8 FL (ref 9.2–12.9)
POTASSIUM SERPL-SCNC: 4.2 MMOL/L (ref 3.5–5.1)
PROT SERPL-MCNC: 6.4 G/DL (ref 6–8.4)
PROTHROMBIN TIME: 14.7 SEC (ref 10.6–14.8)
RBC # BLD AUTO: 3.28 M/UL (ref 4–5.4)
RBC # BLD AUTO: 3.43 M/UL (ref 4–5.4)
SODIUM SERPL-SCNC: 129 MMOL/L (ref 136–145)
WBC # BLD AUTO: 5.78 K/UL (ref 3.9–12.7)
WBC # BLD AUTO: 7.7 K/UL (ref 3.9–12.7)

## 2020-08-18 PROCEDURE — 85027 COMPLETE CBC AUTOMATED: CPT

## 2020-08-18 PROCEDURE — 25000242 PHARM REV CODE 250 ALT 637 W/ HCPCS: Performed by: HOSPITALIST

## 2020-08-18 PROCEDURE — 25000003 PHARM REV CODE 250

## 2020-08-18 PROCEDURE — 25000003 PHARM REV CODE 250: Performed by: HOSPITALIST

## 2020-08-18 PROCEDURE — 84100 ASSAY OF PHOSPHORUS: CPT

## 2020-08-18 PROCEDURE — 94640 AIRWAY INHALATION TREATMENT: CPT

## 2020-08-18 PROCEDURE — 63600175 PHARM REV CODE 636 W HCPCS: Performed by: HOSPITALIST

## 2020-08-18 PROCEDURE — 85025 COMPLETE CBC W/AUTO DIFF WBC: CPT

## 2020-08-18 PROCEDURE — 85730 THROMBOPLASTIN TIME PARTIAL: CPT

## 2020-08-18 PROCEDURE — 27000221 HC OXYGEN, UP TO 24 HOURS

## 2020-08-18 PROCEDURE — 94761 N-INVAS EAR/PLS OXIMETRY MLT: CPT

## 2020-08-18 PROCEDURE — 85610 PROTHROMBIN TIME: CPT

## 2020-08-18 PROCEDURE — 63600175 PHARM REV CODE 636 W HCPCS: Performed by: STUDENT IN AN ORGANIZED HEALTH CARE EDUCATION/TRAINING PROGRAM

## 2020-08-18 PROCEDURE — 80053 COMPREHEN METABOLIC PANEL: CPT

## 2020-08-18 PROCEDURE — 21400001 HC TELEMETRY ROOM

## 2020-08-18 PROCEDURE — 83735 ASSAY OF MAGNESIUM: CPT

## 2020-08-18 PROCEDURE — C9113 INJ PANTOPRAZOLE SODIUM, VIA: HCPCS | Performed by: HOSPITALIST

## 2020-08-18 PROCEDURE — 99900035 HC TECH TIME PER 15 MIN (STAT)

## 2020-08-18 PROCEDURE — 36415 COLL VENOUS BLD VENIPUNCTURE: CPT

## 2020-08-18 RX ORDER — CHLORHEXIDINE GLUCONATE ORAL RINSE 1.2 MG/ML
15 SOLUTION DENTAL 2 TIMES DAILY
Status: DISCONTINUED | OUTPATIENT
Start: 2020-08-18 | End: 2020-08-19 | Stop reason: HOSPADM

## 2020-08-18 RX ORDER — IBUPROFEN 200 MG
24 TABLET ORAL
Status: DISCONTINUED | OUTPATIENT
Start: 2020-08-18 | End: 2020-08-19 | Stop reason: HOSPADM

## 2020-08-18 RX ORDER — INSULIN ASPART 100 [IU]/ML
0-5 INJECTION, SOLUTION INTRAVENOUS; SUBCUTANEOUS
Status: DISCONTINUED | OUTPATIENT
Start: 2020-08-18 | End: 2020-08-19 | Stop reason: HOSPADM

## 2020-08-18 RX ORDER — SODIUM,POTASSIUM PHOSPHATES 280-250MG
1 POWDER IN PACKET (EA) ORAL
Status: DISCONTINUED | OUTPATIENT
Start: 2020-08-18 | End: 2020-08-19 | Stop reason: HOSPADM

## 2020-08-18 RX ORDER — IBUPROFEN 200 MG
16 TABLET ORAL
Status: DISCONTINUED | OUTPATIENT
Start: 2020-08-18 | End: 2020-08-19 | Stop reason: HOSPADM

## 2020-08-18 RX ORDER — MUPIROCIN 20 MG/G
OINTMENT TOPICAL 2 TIMES DAILY
Status: DISCONTINUED | OUTPATIENT
Start: 2020-08-18 | End: 2020-08-19 | Stop reason: HOSPADM

## 2020-08-18 RX ORDER — GLUCAGON 1 MG
1 KIT INJECTION
Status: DISCONTINUED | OUTPATIENT
Start: 2020-08-18 | End: 2020-08-19 | Stop reason: HOSPADM

## 2020-08-18 RX ORDER — IPRATROPIUM BROMIDE AND ALBUTEROL SULFATE 2.5; .5 MG/3ML; MG/3ML
3 SOLUTION RESPIRATORY (INHALATION)
Status: DISCONTINUED | OUTPATIENT
Start: 2020-08-18 | End: 2020-08-19 | Stop reason: HOSPADM

## 2020-08-18 RX ADMIN — HYDROCODONE BITARTRATE AND ACETAMINOPHEN 1 TABLET: 5; 325 TABLET ORAL at 02:08

## 2020-08-18 RX ADMIN — MAGNESIUM OXIDE 400 MG: 400 TABLET ORAL at 08:08

## 2020-08-18 RX ADMIN — MUPIROCIN: 20 OINTMENT TOPICAL at 10:08

## 2020-08-18 RX ADMIN — IPRATROPIUM BROMIDE AND ALBUTEROL SULFATE 3 ML: .5; 3 SOLUTION RESPIRATORY (INHALATION) at 12:08

## 2020-08-18 RX ADMIN — CALCIUM ACETATE 667 MG: 667 CAPSULE ORAL at 08:08

## 2020-08-18 RX ADMIN — MORPHINE SULFATE 2 MG: 2 INJECTION, SOLUTION INTRAMUSCULAR; INTRAVENOUS at 01:08

## 2020-08-18 RX ADMIN — DIPHENHYDRAMINE HYDROCHLORIDE 25 MG: 50 INJECTION INTRAMUSCULAR; INTRAVENOUS at 03:08

## 2020-08-18 RX ADMIN — ISOSORBIDE MONONITRATE 30 MG: 30 TABLET, EXTENDED RELEASE ORAL at 08:08

## 2020-08-18 RX ADMIN — IPRATROPIUM BROMIDE AND ALBUTEROL SULFATE 3 ML: .5; 3 SOLUTION RESPIRATORY (INHALATION) at 08:08

## 2020-08-18 RX ADMIN — DEXTROSE 8 MG/HR: 50 INJECTION, SOLUTION INTRAVENOUS at 11:08

## 2020-08-18 RX ADMIN — CHLORHEXIDINE GLUCONATE 15 ML: 1.2 RINSE ORAL at 10:08

## 2020-08-18 RX ADMIN — DEXTROSE 8 MG/HR: 50 INJECTION, SOLUTION INTRAVENOUS at 12:08

## 2020-08-18 RX ADMIN — DEXTROSE 8 MG/HR: 50 INJECTION, SOLUTION INTRAVENOUS at 05:08

## 2020-08-18 RX ADMIN — DILTIAZEM HYDROCHLORIDE 180 MG: 180 CAPSULE, COATED, EXTENDED RELEASE ORAL at 08:08

## 2020-08-18 RX ADMIN — METOPROLOL TARTRATE 25 MG: 25 TABLET, FILM COATED ORAL at 09:08

## 2020-08-18 RX ADMIN — HYDROCODONE BITARTRATE AND ACETAMINOPHEN 1 TABLET: 5; 325 TABLET ORAL at 03:08

## 2020-08-18 RX ADMIN — SENNOSIDES AND DOCUSATE SODIUM 1 TABLET: 8.6; 5 TABLET ORAL at 08:08

## 2020-08-18 RX ADMIN — POLYETHYLENE GLYCOL 3350 17 G: 17 POWDER, FOR SOLUTION ORAL at 08:08

## 2020-08-18 RX ADMIN — SENNOSIDES AND DOCUSATE SODIUM 1 TABLET: 8.6; 5 TABLET ORAL at 09:08

## 2020-08-18 RX ADMIN — HYDROCODONE BITARTRATE AND ACETAMINOPHEN 1 TABLET: 5; 325 TABLET ORAL at 08:08

## 2020-08-18 NOTE — PROGRESS NOTES
[facility]  Cardiology  Progress Note    Patient Name: Griselda Neely  MRN: [id number]  Admission Date: 8/17/2020  Hospital Length of Stay: 1 days  Code Status: DNR   Attending Physician: Cristian Lopez MD   Primary Care Physician: Kalie Neely MD  Expected Discharge Date:   Principal Problem:GI bleed    Subjective:       Interval History: Denies any chest pain or shortness of breath. Feels better. Denies any vomiting.     ROS  Objective:     Vital Signs (Most Recent):  Temp: 98 °F (36.7 °C) (08/18/20 1555)  Pulse: 72 (08/18/20 1555)  Resp: 20 (08/18/20 1555)  BP: (!) 147/71 (08/18/20 1555)  SpO2: 100 % (08/18/20 1555) Vital Signs (24h Range):  Temp:  [97.9 °F (36.6 °C)-98.8 °F (37.1 °C)] 98 °F (36.7 °C)  Pulse:  [] 72  Resp:  [13-41] 20  SpO2:  [62 %-100 %] 100 %  BP: ()/() 147/71     Weight: 58 kg (127 lb 13.9 oz)  Body mass index is 21.28 kg/m².    SpO2: 100 %  O2 Device (Oxygen Therapy): nasal cannula      Intake/Output Summary (Last 24 hours) at 8/18/2020 1658  Last data filed at 8/18/2020 1100  Gross per 24 hour   Intake 2988.67 ml   Output 4329 ml   Net -1340.33 ml       Lines/Drains/Airways     Drain                 Hemodialysis AV Fistula 08/08/19 0214 Right upper arm 376 days          Peripheral Intravenous Line                 Peripheral IV - Single Lumen 08/17/20 0805 20 G Left Wrist 1 day         Peripheral IV - Single Lumen 08/17/20 0900 20 G Left Upper Arm 1 day                Scheduled Meds:   albuterol-ipratropium  3 mL Nebulization Q6H WAKE    calcium acetate(phosphat bind)  667 mg Oral Daily    chlorhexidine  15 mL Mouth/Throat BID    diltiaZEM  180 mg Oral Daily    epoetin alfonzo-epbx  100 Units/kg Subcutaneous Every Mon, Wed, Fri    isosorbide mononitrate  30 mg Oral Daily    magnesium oxide  400 mg Oral Daily    metoprolol tartrate  25 mg Oral BID    mupirocin   Nasal BID    polyethylene glycol  17 g Oral Daily    senna-docusate 8.6-50 mg  1  tablet Oral BID    sodium thiosulfate  0.25 g Intravenous Three Times Weekly     Continuous Infusions:   pantoprozole (PROTONIX) IV infusion 8 mg/hr (08/18/20 1107)     PRN Meds:.sodium chloride, acetaminophen, dextrose 50%, dextrose 50%, diphenhydrAMINE, glucagon (human recombinant), glucose, glucose, HYDROcodone-acetaminophen, insulin aspart U-100, morphine, ondansetron, promethazine (PHENERGAN) IVPB, promethazine (PHENERGAN) IVPB, sodium chloride 0.9%     Physical Exam  HEENT: Normocephalic, atraumatic, PERRL, Conjunctiva pink, no scleral icterus.   CVS: S1S2+, iRRR, SM+, no rubs or gallops, JVP: Normal.  LUNGS: Clear  ABDOMEN: Soft, NT, BS+  EXTREMITIES: No cyanosis, clubbing. + edema  NEURO: AAO X 3.       Significant Labs:   BMP:   Recent Labs   Lab 08/17/20  0835 08/18/20  0900   GLU 79 321*   * 129*   K 6.1* 4.2   CL 93* 90*   CO2 28 25   BUN 71* 40*   CREATININE 6.5* 4.6*   CALCIUM 8.1* 7.4*   MG  --  1.9   , CMP   Recent Labs   Lab 08/17/20  0835 08/18/20  0900   * 129*   K 6.1* 4.2   CL 93* 90*   CO2 28 25   GLU 79 321*   BUN 71* 40*   CREATININE 6.5* 4.6*   CALCIUM 8.1* 7.4*   PROT 5.8* 6.4   ALBUMIN 2.4* 2.8*   BILITOT 0.6 0.9   ALKPHOS 72 72   AST 18 21   ALT 11 12   ANIONGAP 14 14   ESTGFRAFRICA 6.7* 10.2*   EGFRNONAA 5.8* 8.9*   , CBC   Recent Labs   Lab 08/17/20  1752 08/18/20  0039 08/18/20  0900   WBC 6.49 7.70 5.78   HGB 11.0* 9.9* 10.5*   HCT 32.6* 28.6* 31.6*    183 208   , INR   Recent Labs   Lab 08/17/20  0835 08/18/20  0900   INR 3.5 1.2   , Lipid Panel No results for input(s): CHOL, HDL, LDLCALC, TRIG, CHOLHDL in the last 48 hours. and Troponin No results for input(s): TROPONINI in the last 48 hours.    Significant Imaging: Reviewed  Assessment and Plan:     IMPRESSION:  Severe Anemia. Acute on chronic. s/p EGD with AAVMs s/p cauterization.      GI bleed.      Congestive heart failure. Reduced LV systolic function.  Euvolemic on exam.     Mitral regurgitation.  Severe.   With severe calcification.  Not a candidate for percutaneous interventions.  After discussion with Dr. Lora, she decided to be treated only with medical management. Newly reduced LVEF to 35-40% on 4/11/2019. Status post mitral valve replacement with a size 25 mm [employer] bovine pericardial prosthesis via right thoracotomy on by Dr. Lora 3/21/2019.      Atrial fibrillation.  Persistent. Currently in atrial fibrillation with controlled ventricular response by exam.  On coumadin with supratherapeutic INR.       End-stage renal disease on hemodialysis.  MWF. Follows with Dr. Ingram.        Tobacco abuse.  Ongoing despite counseling.     Left atrial thrombus.      CODE STATUS was addressed on 7/16/2020 and the patient was alert, oriented ×3.  She preferred to be a DNI.  She would like to be resuscitated. CODE STATUS was addressed with the presence of her sister and son.     PLAN:  1. Continue current management.         Katharina Powell MD  Cardiology  Critical access hospital

## 2020-08-18 NOTE — PROGRESS NOTES
[facility]  Progress Note    Patient name: Griselda Neely  MRN: [id number]  Admit Date: 8/17/2020   LOS: 1 day     SUBJECTIVE:     Principal problem: GI bleed    Interval History:  Patient was seen and examined bedside.  She improved remarkably with 4 units PRBCs.  No more bleeding noted.  No new complaints.  Hemodynamically stable.  After holding her Cardizem and other medicines her blood pressure was going up and she went into Afib.     Scheduled Meds:   albuterol-ipratropium  3 mL Nebulization Q6H WAKE    calcium acetate(phosphat bind)  667 mg Oral Daily    chlorhexidine  15 mL Mouth/Throat BID    diltiaZEM  180 mg Oral Daily    epoetin alfonzo-epbx  100 Units/kg Subcutaneous Every Mon, Wed, Fri    isosorbide mononitrate  30 mg Oral Daily    magnesium oxide  400 mg Oral Daily    metoprolol tartrate  25 mg Oral BID    mupirocin   Nasal BID    polyethylene glycol  17 g Oral Daily    senna-docusate 8.6-50 mg  1 tablet Oral BID    sodium thiosulfate  0.25 g Intravenous Three Times Weekly     Continuous Infusions:   pantoprozole (PROTONIX) IV infusion 8 mg/hr (08/18/20 0525)     PRN Meds:sodium chloride, acetaminophen, diphenhydrAMINE, HYDROcodone-acetaminophen, morphine, ondansetron, promethazine (PHENERGAN) IVPB, promethazine (PHENERGAN) IVPB, sodium chloride 0.9%    Review of patient's allergies indicates:  No Known Allergies    Review of Systems: As per interval history    OBJECTIVE:     Vital Signs (Most Recent)  Temp: 98.8 °F (37.1 °C) (08/18/20 0800)  Pulse: 74 (08/18/20 1000)  Resp: 15 (08/18/20 1000)  BP: 122/63 (08/18/20 1000)  SpO2: 98 % (08/18/20 1000)    Vital Signs Range (Last 24H):  Temp:  [97.1 °F (36.2 °C)-98.8 °F (37.1 °C)]   Pulse:  []   Resp:  [13-41]   BP: ()/()   SpO2:  [62 %-100 %]     I & O (Last 24H):    Intake/Output Summary (Last 24 hours) at 8/18/2020 1040  Last data filed at 8/18/2020 1000  Gross per 24 hour   Intake 4561.67 ml    Output 4329 ml   Net 232.67 ml       Physical Exam:  General:  Chronically ill-appearing, nontoxic Patient resting comfortably in no acute distress. Appears as stated age. Calm  Eyes: No conjunctival injection. No scleral icterus.  ENT: Hearing grossly intact. No discharge from ears. No nasal discharge.   Neck: Supple, trachea midline. No JVD  CVS: RRR. No LE edema BL  Lungs:  On 3 L oxygen mild crackles bilaterally, no wheezing or crackles. Good breath sounds. No accessory muscle use. No acute respiratory distress  Abdomen:  Soft, nontender and nondistended.  No organomegaly  Neuro: AOx3. Moves all extremities. Follows commands. Responds appropriately   Skin:  No rash or erythema noted.  Bilateral lower extremity skin changes    Laboratory:  CBC:   Recent Labs   Lab 08/18/20  0900   WBC 5.78   RBC 3.43*   HGB 10.5*   HCT 31.6*      MCV 92   MCH 30.6   MCHC 33.2     CMP:   Recent Labs   Lab 08/18/20  0900   *   CALCIUM 7.4*   ALBUMIN 2.8*   PROT 6.4   *   K 4.2   CO2 25   CL 90*   BUN 40*   CREATININE 4.6*   ALKPHOS 72   ALT 12   AST 21   BILITOT 0.9       Diagnostic Results:  Reviewed all imaging    ASSESSMENT/PLAN:     74-year-old lady with numerous cardio renal comorbidities including CHF, AFib, ESRD on hemodialysis, a mitral valve replacement, numerous hospital admissions came from dialysis unit due to hematemesis found to be profoundly anemic and hypotensive in ED.    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]  Yes    Anemia [D64.9]  Yes     Chronic    H/O mitral valve replacement [Z95.2]  Not Applicable     Chronic    Peripheral vascular disease now with left lower extremity occlusion [I70.90]  Yes    DNR (do not resuscitate) [Z66]  Yes     Chronic    Chronic respiratory failure [J96.10]  Yes     Chronic     3 L O2 at home      Non-rheumatic mitral regurgitation [I34.0]  Yes    Other persistent atrial fibrillation [I48.19]  Yes     Coronary arteriosclerosis in native artery [I25.10]  Yes    ESRD (end stage renal disease) on HD M,W, F [N18.6]  Yes     Chronic      Resolved Hospital Problems   No resolved problems to display.         Plan:     Status post 4 units PRBCs.  She had two AVMs with 1 bleeding AVM in duodenum status post successful cauterization    Slowly resume her home medications.  Continue holding Coumadin at least today    Help from GI, Nephrology and Cardiology appreciated    Continue PPI    DNR and DNI    Daily labs including INR    Likely discharge tomorrow if she remains stable and CBC is stable    VTE Risk Mitigation (From admission, onward)         Ordered     Reason for No Pharmacological VTE Prophylaxis  Once     Question:  Reasons:  Answer:  Active Bleeding    08/17/20 1030     IP VTE HIGH RISK PATIENT  Once      08/17/20 1030     Place sequential compression device  Until discontinued      08/17/20 1030                  Department Hospital Medicine  [facility]  Cristian Lopez MD  Date of service: 08/18/2020

## 2020-08-18 NOTE — NURSING TRANSFER
Nursing Transfer Note      8/18/2020     Transfer To: [id number]    Transfer via bed    Transfer with  to O2, cardiac monitoring    Transported by Carla Neely RN     Medicines sent: protonix IVPB drip and insulin pen    Chart send with patient: Yes    Notified: sister at bedside with patient    Patient reassessed at: 08/18/2020 at 1300    Upon arrival to floor: cardiac monitor applied, patient oriented to room, call bell in reach and bed in lowest position.      Patient asking for new lunch tray.  Spoke with dietary who stated they would send the tray to [facility].  
None

## 2020-08-18 NOTE — CONSULTS
"Critical access hospital  Adult Nutrition   Consult Note (Initial Assessment)     SUMMARY     Recommendations  Recommendation/Intervention: 1. Continue Dental Soft (IDDSI Level 6) Renal Diet. 2. Advance diet texture as tolerated/ as medically able. 3. Encourage PO intake. 4. Menu  to obtain meal selections and obtain food preferences daily. 5. Hypophosphatemia (phos 2.1). Recommend continued monitoring and correction as appropriate. Instance of hyperglycemia per lab results (Glu 321). Recommend continued monitoring.  Goals: 1. Patient to meet at least 75% of estimated energy and protein needs. 2. Blood glucose and electrolytes to trend towards normal limits/ target ranges.  Nutrition Goal Status: new  Communication of RD Recs: reviewed with RN    Dietitian Rounds Brief  Patient assessed 2' consult "new admit" and ICU status. Patient has poor intake, patient was on mechanical soft diet. No chewing issues per pt and per RN. RD recommended changing diet texture. RN advanced to Dental Soft (IDDSI level 6). RD offered shake and added Nepro. Patient will not drink nepro and wants ice cream shake. BG elevated, but POC glucose 79 and HA1c shows good glycemia control. Phos low and K+ WNL. Added vanilla milkshake once. Monitor labs and provide more often if appropriate. Patient transferred to cardiology after rounds.    Reason for Assessment  Reason For Assessment: consult  Relevant Medical History: ESRD on HD; Anemia; GI bleed    Nutrition Risk Screen  Nutrition Risk Screen: dysphagia or difficulty swallowing      Nutrition/Diet History  Food Allergies: NKFA  Factors Affecting Nutritional Intake: (possible dysphagia however, patient denies)    Anthropometrics  Temp: 97.9 °F (36.6 °C)  Height Method: Stated  Height: 5' 5" (165.1 cm)  Height (inches): 65 in  Weight Method: Bed Scale  Weight: 58 kg (127 lb 13.9 oz)  Weight (lb): 127.87 lb  Ideal Body Weight (IBW), Female: 125 lb  % Ideal Body Weight, Female (lb): 102.3 " %  BMI (Calculated): 21.3  BMI Grade: 18.5-24.9 - normal       Weight History:  Wt Readings from Last 10 Encounters:   08/17/20 58 kg (127 lb 13.9 oz)   08/04/20 54.9 kg (121 lb)   07/22/20 54.9 kg (121 lb 0.5 oz)   07/10/20 40.9 kg (90 lb 2.7 oz)   06/14/20 54.8 kg (120 lb 13 oz)   06/05/20 61.2 kg (134 lb 14.7 oz)   06/03/20 69.4 kg (153 lb)   05/12/20 65 kg (143 lb 4.8 oz)   05/01/20 65.8 kg (145 lb)   04/23/20 62.6 kg (138 lb 0.1 oz)       Lab/Procedures/Meds: Pertinent Labs Reviewed    Clinical Chemistry:  Recent Labs   Lab 08/18/20  0900   *   K 4.2   CL 90*   CO2 25   *   BUN 40*   CREATININE 4.6*   CALCIUM 7.4*   PROT 6.4   ALBUMIN 2.8*   BILITOT 0.9   ALKPHOS 72   AST 21   ALT 12   ANIONGAP 14   ESTGFRAFRICA 10.2*   EGFRNONAA 8.9*   MG 1.9   PHOS 2.1*       CBC:   Recent Labs   Lab 08/18/20  0900   WBC 5.78   RBC 3.43*   HGB 10.5*   HCT 31.6*      MCV 92   MCH 30.6   MCHC 33.2       Cardiac Profile:  Recent Labs   Lab 08/17/20  0835   BNP >4,500*       Diabetes:  Lab Results   Component Value Date    HGBA1C 5.0 10/19/2019     Medications: Pertinent Medications reviewed    Scheduled Meds:   albuterol-ipratropium  3 mL Nebulization Q6H WAKE    calcium acetate(phosphat bind)  667 mg Oral Daily    chlorhexidine  15 mL Mouth/Throat BID    diltiaZEM  180 mg Oral Daily    epoetin alfonzo-epbx  100 Units/kg Subcutaneous Every Mon, Wed, Fri    isosorbide mononitrate  30 mg Oral Daily    magnesium oxide  400 mg Oral Daily    metoprolol tartrate  25 mg Oral BID    mupirocin   Nasal BID    polyethylene glycol  17 g Oral Daily    senna-docusate 8.6-50 mg  1 tablet Oral BID    sodium thiosulfate  0.25 g Intravenous Three Times Weekly       Continuous Infusions:   pantoprozole (PROTONIX) IV infusion 8 mg/hr (08/18/20 1107)       PRN Meds:.sodium chloride, acetaminophen, dextrose 50%, dextrose 50%, diphenhydrAMINE, glucagon (human recombinant), glucose, glucose, HYDROcodone-acetaminophen,  insulin aspart U-100, morphine, ondansetron, promethazine (PHENERGAN) IVPB, promethazine (PHENERGAN) IVPB, sodium chloride 0.9%    Estimated/Assessed Needs  Weight Used For Calorie Calculations: 58 kg (127 lb 13.9 oz)  Energy Calorie Requirements (kcal): 1450 - 1740 (25- 30)  Energy Need Method: Kcal/kg  Protein Requirements: 70 - 87 (1.2 - 1.5)  Weight Used For Protein Calculations: 58 kg (127 lb 13.9 oz)  Fluid Requirements (mL): UOP + 1000 ml/day or per MD  Estimated Fluid Requirement Method: other (see comments)  RDA Method (mL): 1450       Nutrition Prescription Ordered  Current Diet Order: Mechanical Soft IDDSI Level 5    Evaluation of Received Nutrient/Fluid Intake  Energy Calories Required: not meeting needs  Protein Required: not meeting needs  Fluid Required: meeting needs  Tolerance: tolerating     Intake/Output Summary (Last 24 hours) at 8/18/2020 1539  Last data filed at 8/18/2020 1100  Gross per 24 hour   Intake 3618.67 ml   Output 4329 ml   Net -710.33 ml      % Intake of Estimated Energy Needs: 25 - 50 %  % Meal Intake: 25 - 50 %    Nutrition Risk  Level of Risk/Frequency of Follow-up: high     Monitor and Evaluation  Food and Nutrient Intake: energy intake, food and beverage intake  Food and Nutrient Adminstration: diet order  Physical Activity and Function: nutrition-related ADLs and IADLs, factors affecting access to physical activity  Anthropometric Measurements: weight, weight change, body mass index  Biochemical Data, Medical Tests and Procedures: electrolyte and renal panel, inflammatory profile, gastrointestinal profile, lipid profile, glucose/endocrine profile  Nutrition-Focused Physical Findings: overall appearance     Nutrition Follow-Up  RD Follow-up?: Yes    Bel Leo RD 08/18/2020 3:40 PM

## 2020-08-18 NOTE — [employer]ATION PATIENT INSTRUCTIONS
Discharge Summary/Instructions after an Endoscopic Procedure  Patient Name: Griselda Neely  Patient MRN: [id number]  Patient YOB: 1945 Monday, August 17, 2020  Talat Trevizo MD  RESTRICTIONS:  During your procedure today, you received medications for sedation.  These   medications may affect your judgment, balance and coordination.  Therefore,   for 24 hours, you have the following restrictions:   - DO NOT drive a car, operate machinery, make legal/financial decisions,   sign important papers or drink alcohol.    ACTIVITY:  Today: no heavy lifting, straining or running due to procedural   sedation/anesthesia.  The following day: return to full activity including work.  DIET:  Eat and drink normally unless instructed otherwise.     TREATMENT FOR COMMON SIDE EFFECTS:  - Mild abdominal pain, nausea, belching, bloating or excessive gas:  rest,   eat lightly and use a heating pad.  - Sore Throat: treat with throat lozenges and/or gargle with warm salt   water.  - Because air was used during the procedure, expelling large amounts of air   from your rectum or belching is normal.  - If a bowel prep was taken, you may not have a bowel movement for 1-3 days.    This is normal.  SYMPTOMS TO WATCH FOR AND REPORT TO YOUR PHYSICIAN:  1. Abdominal pain or bloating, other than gas cramps.  2. Chest pain.  3. Back pain.  4. Signs of infection such as: chills or fever occurring within 24 hours   after the procedure.  5. Rectal bleeding, which would show as bright red, maroon, or black stools.   (A tablespoon of blood from the rectum is not serious, especially if   hemorrhoids are present.)  6. Vomiting.  7. Weakness or dizziness.  GO DIRECTLY TO THE NEAREST EMERGENCY ROOM IF YOU HAVE ANY OF THE FOLLOWING:      Difficulty breathing              Chills and/or fever over 101 F   Persistent vomiting and/or vomiting blood   Severe abdominal pain   Severe chest pain   Black, tarry stools   Bleeding- more than one tablespoon   Any  other symptom or condition that you feel may need urgent attention  Your doctor recommends these additional instructions:  If any biopsies were taken, your doctors clinic will contact you in 1 to 2   weeks with any results.  -Monitor h/h and resume coumadin in 24 hours  -Outpatient colonoscopy     - Return patient to ICU for ongoing care.  For questions, problems or results please call your physician - Talat Trevizo MD at Work:  [phone].  [facility], EMERGENCY ROOM PHONE NUMBER: [phone]  IF A COMPLICATION OR EMERGENCY SITUATION ARISES AND YOU ARE UNABLE TO REACH   YOUR PHYSICIAN - GO DIRECTLY TO THE EMERGENCY ROOM.  MD Talat Moreno MD  8/18/2020 9:36:56 AM  This report has been verified and signed electronically.  [employer]

## 2020-08-18 NOTE — PROGRESS NOTES
[facility]  Nephrology  Progress Note    Patient Name: Griselda Neely  MRN: [id number]  Admission Date: 8/17/2020  Hospital Length of Stay: 1 days  Attending Provider: Cristian Lopez MD   Primary Care Physician: Kalie Neely MD  Principal Problem:GI bleed    Consults  Subjective:     Interval History:     Sitting up in bed  Feels better today  hgb 10.5 s/p 4 units PRBC yesterday; UGI results reviewed  Hypophosphatemia-replete  Hyponatremia- limit free water  Hypoalbuminemia- maximize nutrition  No acute changes   Am labs.        Review of patient's allergies indicates:  No Known Allergies  Current Facility-Administered Medications   Medication Frequency    0.9%  NaCl infusion (for blood administration) Q24H PRN    acetaminophen tablet 650 mg Q4H PRN    albuterol-ipratropium 2.5 mg-0.5 mg/3 mL nebulizer solution 3 mL Q6H WAKE    calcium acetate(phosphat bind) capsule 667 mg Daily    chlorhexidine 0.12 % solution 15 mL BID    dextrose 50% injection 12.5 g PRN    dextrose 50% injection 25 g PRN    diltiaZEM 24 hr capsule 180 mg Daily    diphenhydrAMINE injection 25 mg Q4H PRN    epoetin alfonzo-epbx injection 6,100 Units Every Mon, Wed, Fri    glucagon (human recombinant) injection 1 mg PRN    glucose chewable tablet 16 g PRN    glucose chewable tablet 24 g PRN    HYDROcodone-acetaminophen 5-325 mg per tablet 1 tablet Q6H PRN    insulin aspart U-100 pen 0-5 Units QID (AC + HS) PRN    isosorbide mononitrate 24 hr tablet 30 mg Daily    magnesium oxide tablet 400 mg Daily    metoprolol tartrate (LOPRESSOR) tablet 25 mg BID    morphine injection 2 mg Q4H PRN    mupirocin 2 % ointment BID    ondansetron injection 4 mg Q6H PRN    pantoprazole (PROTONIX) 40 mg in dextrose 5 % 100 mL infusion Continuous    polyethylene glycol packet 17 g Daily    promethazine (PHENERGAN) 6.25 mg in dextrose 5 % 50 mL IVPB Q6H PRN    promethazine (PHENERGAN) 6.25 mg in dextrose 5 % 50 mL IVPB Q10  Min PRN    senna-docusate 8.6-50 mg per tablet 1 tablet BID    sodium chloride 0.9% flush 10 mL PRN    sodium thiosulfate 12.5 gram/50 mL (250 mg/mL) injection 0.25 g Three Times Weekly       Objective:     Vital Signs (Most Recent):  Temp: 98 °F (36.7 °C) (08/18/20 1555)  Pulse: 72 (08/18/20 1555)  Resp: 20 (08/18/20 1555)  BP: (!) 147/71 (08/18/20 1555)  SpO2: 100 % (08/18/20 1555)  O2 Device (Oxygen Therapy): nasal cannula (08/18/20 1555) Vital Signs (24h Range):  Temp:  [97.9 °F (36.6 °C)-98.8 °F (37.1 °C)] 98 °F (36.7 °C)  Pulse:  [] 72  Resp:  [13-41] 20  SpO2:  [62 %-100 %] 100 %  BP: ()/() 147/71     Weight: 58 kg (127 lb 13.9 oz) (08/17/20 1901)  Body mass index is 21.28 kg/m².  Body surface area is 1.63 meters squared.    I/O last 3 completed shifts:  In: 4361.7 [P.O.:240; I.V.:199.7; Blood:1543; Other:2329; IV Piggyback:50]  Out: 4329 [Other:4329]    Physical Exam  Vitals signs and nursing note reviewed.   Constitutional:       Appearance: Normal appearance.   HENT:      Head:      Comments: No JVD     Mouth/Throat:      Mouth: Mucous membranes are moist.   Neck:      Musculoskeletal: Normal range of motion and neck supple.   Cardiovascular:      Rate and Rhythm: Normal rate.      Pulses: Normal pulses.      Comments: irregular  Pulmonary:      Effort: Pulmonary effort is normal. No respiratory distress.      Breath sounds: Normal breath sounds.   Skin:     Comments: Rash (dry skin; hyperpigmented; B U&L Ext's) present.      Comments: Right arm:  AVF +bruit/thrill   Neurological:      Mental Status: She is alert and oriented to person, place, and time.   Psychiatric:         Mood and Affect: Mood normal.         Behavior: Behavior normal.         Significant Labs:sure  CBC:   Recent Labs   Lab 08/18/20  0900   WBC 5.78   RBC 3.43*   HGB 10.5*   HCT 31.6*      MCV 92   MCH 30.6   MCHC 33.2     CMP:   Recent Labs   Lab 08/18/20  0900   *   CALCIUM 7.4*   ALBUMIN 2.8*    PROT 6.4   *   K 4.2   CO2 25   CL 90*   BUN 40*   CREATININE 4.6*   ALKPHOS 72   ALT 12   AST 21   BILITOT 0.9     All labs within the past 24 hours have been reviewed.    Significant Imaging:      Assessment/Plan:     Active Diagnoses:    Diagnosis Date Noted POA    PRINCIPAL PROBLEM:  GI bleed [K92.2] 08/17/2020 Yes    Calciphylaxis [E83.59] 06/05/2020 Yes    Anemia [D64.9] 03/17/2020 Yes     Chronic    H/O mitral valve replacement [Z95.2] 03/17/2020 Not Applicable     Chronic    Peripheral vascular disease now with left lower extremity occlusion [I70.90] 03/14/2020 Yes    DNR (do not resuscitate) [Z66] 09/30/2019 Yes     Chronic    Chronic respiratory failure [J96.10] 05/13/2019 Yes     Chronic    Non-rheumatic mitral regurgitation [I34.0] 07/25/2017 Yes    Other persistent atrial fibrillation [I48.19] 07/18/2017 Yes    Coronary arteriosclerosis in native artery [I25.10] 04/04/2017 Yes    ESRD (end stage renal disease) on HD M,W, F [N18.6] 05/11/2016 Yes     Chronic      Problems Resolved During this Admission:       Assessment and Plan:        1.  ESRD- HD M-W-F schedule; Potassium normalized;  Low k/phos diet, strict I&O, daily weight, renal dose all meds appropriate GFR, No BP/IV stick right arm.  Monitor renal chemistries.       2.  Anemia- HGB 10.5 s/p 4 units PRBC's yesterday.  EPO with dialysis.   Duodenal bleeds were treated and ablated per GI. Resuming coumadin after 24 hours.   Monitor H/H closely and outpatient coloscopy.        3. Renal BMD- Calcium corrects 8.4 for low albumin; phos 2.1- replete; taking Ca Acetate; currently treated for Calciphylaxis with Sodium Thiosulfate.       4.  Hypertension-   O.K for now; resume BP meds; goal SBP <140>90; titrate as needed.    5.  Hyponatremia- trending down; challenge UF with HD; No hypotonic fluids; limit free water.       5.  GI Bleed- Defer to GI               Gabe Barksdale NP  Nephrology  [facility]

## 2020-08-18 NOTE — PROGRESS NOTES
Cardiac Rehab     Griselda DARRICK Neely   [id number]   8/18/2020         Cardiac Rehab Phase Taught: Phase 1    Teaching Method: Verbal    Handouts: None    Educational Videos: None    Understanding:  Learning indicated by feedback and Verbalize understanding    Comments: pt sitting up in chair, review of CHF and smoking cessation, pt states to return later. Will follow    Total Time Spent:10mins            Marlene Frank RN

## 2020-08-19 VITALS
DIASTOLIC BLOOD PRESSURE: 91 MMHG | RESPIRATION RATE: 18 BRPM | OXYGEN SATURATION: 98 % | HEIGHT: 65 IN | SYSTOLIC BLOOD PRESSURE: 176 MMHG | TEMPERATURE: 98 F | HEART RATE: 98 BPM | BODY MASS INDEX: 21.31 KG/M2 | WEIGHT: 127.88 LBS

## 2020-08-19 PROBLEM — K92.2 GI BLEED: Status: RESOLVED | Noted: 2020-08-17 | Resolved: 2020-08-19

## 2020-08-19 LAB
ALBUMIN SERPL BCP-MCNC: 2.9 G/DL (ref 3.5–5.2)
ALBUMIN SERPL BCP-MCNC: 2.9 G/DL (ref 3.5–5.2)
ALP SERPL-CCNC: 81 U/L (ref 55–135)
ALT SERPL W/O P-5'-P-CCNC: 11 U/L (ref 10–44)
ANION GAP SERPL CALC-SCNC: 16 MMOL/L (ref 8–16)
ANION GAP SERPL CALC-SCNC: 16 MMOL/L (ref 8–16)
APTT PPP: 33.7 SEC (ref 23.6–33.3)
AST SERPL-CCNC: 20 U/L (ref 10–40)
BASOPHILS # BLD AUTO: 0.02 K/UL (ref 0–0.2)
BASOPHILS NFR BLD: 0.3 % (ref 0–1.9)
BILIRUB SERPL-MCNC: 1 MG/DL (ref 0.1–1)
BUN SERPL-MCNC: 56 MG/DL (ref 8–23)
BUN SERPL-MCNC: 56 MG/DL (ref 8–23)
CALCIUM SERPL-MCNC: 7.9 MG/DL (ref 8.7–10.5)
CALCIUM SERPL-MCNC: 7.9 MG/DL (ref 8.7–10.5)
CHLORIDE SERPL-SCNC: 93 MMOL/L (ref 95–110)
CHLORIDE SERPL-SCNC: 93 MMOL/L (ref 95–110)
CO2 SERPL-SCNC: 25 MMOL/L (ref 23–29)
CO2 SERPL-SCNC: 25 MMOL/L (ref 23–29)
CREAT SERPL-MCNC: 6 MG/DL (ref 0.5–1.4)
CREAT SERPL-MCNC: 6 MG/DL (ref 0.5–1.4)
DIFFERENTIAL METHOD: ABNORMAL
EOSINOPHIL # BLD AUTO: 0.1 K/UL (ref 0–0.5)
EOSINOPHIL NFR BLD: 1.3 % (ref 0–8)
ERYTHROCYTE [DISTWIDTH] IN BLOOD BY AUTOMATED COUNT: 18 % (ref 11.5–14.5)
EST. GFR  (AFRICAN AMERICAN): 7.4 ML/MIN/1.73 M^2
EST. GFR  (AFRICAN AMERICAN): 7.4 ML/MIN/1.73 M^2
EST. GFR  (NON AFRICAN AMERICAN): 6.4 ML/MIN/1.73 M^2
EST. GFR  (NON AFRICAN AMERICAN): 6.4 ML/MIN/1.73 M^2
GLUCOSE SERPL-MCNC: 141 MG/DL (ref 70–110)
GLUCOSE SERPL-MCNC: 55 MG/DL (ref 70–110)
HCT VFR BLD AUTO: 32.8 % (ref 37–48.5)
HGB BLD-MCNC: 10.7 G/DL (ref 12–16)
IMM GRANULOCYTES # BLD AUTO: 0.03 K/UL (ref 0–0.04)
IMM GRANULOCYTES NFR BLD AUTO: 0.4 % (ref 0–0.5)
INR PPP: 1.2
LYMPHOCYTES # BLD AUTO: 0.8 K/UL (ref 1–4.8)
LYMPHOCYTES NFR BLD: 11.4 % (ref 18–48)
MAGNESIUM SERPL-MCNC: 2.2 MG/DL (ref 1.6–2.6)
MCH RBC QN AUTO: 30.5 PG (ref 27–31)
MCHC RBC AUTO-ENTMCNC: 32.6 G/DL (ref 32–36)
MCV RBC AUTO: 93 FL (ref 82–98)
MONOCYTES # BLD AUTO: 0.7 K/UL (ref 0.3–1)
MONOCYTES NFR BLD: 10.6 % (ref 4–15)
NEUTROPHILS # BLD AUTO: 5.2 K/UL (ref 1.8–7.7)
NEUTROPHILS NFR BLD: 76 % (ref 38–73)
NRBC BLD-RTO: 0 /100 WBC
PHOSPHATE SERPL-MCNC: 3.3 MG/DL (ref 2.7–4.5)
PHOSPHATE SERPL-MCNC: 3.3 MG/DL (ref 2.7–4.5)
PLATELET # BLD AUTO: 198 K/UL (ref 150–350)
PMV BLD AUTO: 10.2 FL (ref 9.2–12.9)
POTASSIUM SERPL-SCNC: 4.7 MMOL/L (ref 3.5–5.1)
POTASSIUM SERPL-SCNC: 4.7 MMOL/L (ref 3.5–5.1)
PROT SERPL-MCNC: 6.5 G/DL (ref 6–8.4)
PROTHROMBIN TIME: 14.2 SEC (ref 10.6–14.8)
RBC # BLD AUTO: 3.51 M/UL (ref 4–5.4)
SODIUM SERPL-SCNC: 134 MMOL/L (ref 136–145)
SODIUM SERPL-SCNC: 134 MMOL/L (ref 136–145)
WBC # BLD AUTO: 6.9 K/UL (ref 3.9–12.7)

## 2020-08-19 PROCEDURE — 84100 ASSAY OF PHOSPHORUS: CPT

## 2020-08-19 PROCEDURE — 80053 COMPREHEN METABOLIC PANEL: CPT

## 2020-08-19 PROCEDURE — 63600175 PHARM REV CODE 636 W HCPCS: Performed by: INTERNAL MEDICINE

## 2020-08-19 PROCEDURE — 94640 AIRWAY INHALATION TREATMENT: CPT

## 2020-08-19 PROCEDURE — 27000221 HC OXYGEN, UP TO 24 HOURS

## 2020-08-19 PROCEDURE — 25000003 PHARM REV CODE 250: Performed by: HOSPITALIST

## 2020-08-19 PROCEDURE — 90935 HEMODIALYSIS ONE EVALUATION: CPT

## 2020-08-19 PROCEDURE — 63600175 PHARM REV CODE 636 W HCPCS: Performed by: HOSPITALIST

## 2020-08-19 PROCEDURE — 36415 COLL VENOUS BLD VENIPUNCTURE: CPT

## 2020-08-19 PROCEDURE — 94761 N-INVAS EAR/PLS OXIMETRY MLT: CPT

## 2020-08-19 PROCEDURE — 85610 PROTHROMBIN TIME: CPT

## 2020-08-19 PROCEDURE — 25000242 PHARM REV CODE 250 ALT 637 W/ HCPCS: Performed by: HOSPITALIST

## 2020-08-19 PROCEDURE — C9113 INJ PANTOPRAZOLE SODIUM, VIA: HCPCS | Performed by: HOSPITALIST

## 2020-08-19 PROCEDURE — 82962 GLUCOSE BLOOD TEST: CPT

## 2020-08-19 PROCEDURE — 83735 ASSAY OF MAGNESIUM: CPT

## 2020-08-19 PROCEDURE — 99900035 HC TECH TIME PER 15 MIN (STAT)

## 2020-08-19 PROCEDURE — 85730 THROMBOPLASTIN TIME PARTIAL: CPT

## 2020-08-19 PROCEDURE — 85025 COMPLETE CBC W/AUTO DIFF WBC: CPT

## 2020-08-19 RX ORDER — PANTOPRAZOLE SODIUM 40 MG/1
40 TABLET, DELAYED RELEASE ORAL 2 TIMES DAILY
Qty: 60 TABLET | Refills: 0 | Status: SHIPPED | OUTPATIENT
Start: 2020-08-19 | End: 2020-09-17 | Stop reason: SDUPTHER

## 2020-08-19 RX ORDER — PANTOPRAZOLE SODIUM 40 MG/1
40 TABLET, DELAYED RELEASE ORAL 2 TIMES DAILY
Status: DISCONTINUED | OUTPATIENT
Start: 2020-08-19 | End: 2020-08-19 | Stop reason: HOSPADM

## 2020-08-19 RX ORDER — WARFARIN 2.5 MG/1
2.5 TABLET ORAL DAILY
Qty: 30 TABLET | Refills: 0 | Status: ON HOLD | OUTPATIENT
Start: 2020-08-19 | End: 2020-09-06 | Stop reason: HOSPADM

## 2020-08-19 RX ORDER — METOPROLOL SUCCINATE 25 MG/1
25 TABLET, EXTENDED RELEASE ORAL NIGHTLY
Qty: 30 TABLET | Refills: 0 | Status: SHIPPED | OUTPATIENT
Start: 2020-08-19 | End: 2020-09-17 | Stop reason: SDUPTHER

## 2020-08-19 RX ADMIN — ISOSORBIDE MONONITRATE 30 MG: 30 TABLET, EXTENDED RELEASE ORAL at 05:08

## 2020-08-19 RX ADMIN — DEXTROSE 8 MG/HR: 50 INJECTION, SOLUTION INTRAVENOUS at 02:08

## 2020-08-19 RX ADMIN — HYDROCODONE BITARTRATE AND ACETAMINOPHEN 1 TABLET: 5; 325 TABLET ORAL at 01:08

## 2020-08-19 RX ADMIN — MORPHINE SULFATE 2 MG: 2 INJECTION, SOLUTION INTRAMUSCULAR; INTRAVENOUS at 10:08

## 2020-08-19 RX ADMIN — DEXTROSE 8 MG/HR: 50 INJECTION, SOLUTION INTRAVENOUS at 07:08

## 2020-08-19 RX ADMIN — PANTOPRAZOLE SODIUM 40 MG: 40 TABLET, DELAYED RELEASE ORAL at 05:08

## 2020-08-19 RX ADMIN — EPOETIN ALFA-EPBX 6100 UNITS: 10000 INJECTION, SOLUTION INTRAVENOUS; SUBCUTANEOUS at 04:08

## 2020-08-19 RX ADMIN — DILTIAZEM HYDROCHLORIDE 180 MG: 180 CAPSULE, COATED, EXTENDED RELEASE ORAL at 05:08

## 2020-08-19 RX ADMIN — DEXTROSE MONOHYDRATE 12.5 G: 25 INJECTION, SOLUTION INTRAVENOUS at 06:08

## 2020-08-19 RX ADMIN — METOPROLOL TARTRATE 25 MG: 25 TABLET, FILM COATED ORAL at 05:08

## 2020-08-19 RX ADMIN — IPRATROPIUM BROMIDE AND ALBUTEROL SULFATE 3 ML: .5; 3 SOLUTION RESPIRATORY (INHALATION) at 08:08

## 2020-08-19 RX ADMIN — MAGNESIUM OXIDE 400 MG: 400 TABLET ORAL at 05:08

## 2020-08-19 RX ADMIN — HYDROCODONE BITARTRATE AND ACETAMINOPHEN 1 TABLET: 5; 325 TABLET ORAL at 07:08

## 2020-08-19 RX ADMIN — CALCIUM ACETATE 667 MG: 667 CAPSULE ORAL at 05:08

## 2020-08-19 NOTE — DISCHARGE SUMMARY
[facility]  Discharge Summary      Patient Name: Griselda Neely  MRN: [id number]  Admission Date: 8/17/2020  Hospital Length of Stay: 2 days  Discharge Date and Time:  08/19/2020 6:36 PM  Attending Physician: Cristian Lopez MD   Discharging Provider: Cristian Lopez MD  Primary Care Provider: Kalie Neely MD      HPI:   Griselda Neely is a 74 y.o. Black or  female who  has a past medical history of Anemia, Calciphylaxis (07/2017), CHF (congestive heart failure), Encounter for blood transfusion (03/2016), Gout, Hemodialysis access, AV graft, Hypertension, Mitral valve regurgitation, Osteoarthritis, Pancreatitis, Peripheral vascular disease, Pneumonia (09/09/2017), and Renal failure.. The patient presented to [facility] on 7/22/2020 with a primary complaint of Wound Check (Referred here for removal of adhered wound vac dressing, home health unable to remove, L leg) who came from dialysis unit due to hematemesis.  Upon further asking patient mentioned that she was in her usual state of health until 5:00 a.m. today when she started throwing up blood however she decided to go to her dialysis unit and she had another episode of being hematemesis and dialysis was aborted and was sent to ED.  Of note she takes Coumadin. She reports mild dizziness otherwise denies any new symptoms.     In ED patient was found to be profoundly anemic, hypotensive, tachycardic and her CBC showed hemoglobin of 4.2 with INR of 3.5.  GI, Nephrology was consulted stat.  She received vitamin K, 2 units FFP and when I was interviewing she was getting 2nd unit of blood.  She was hypotensive during my interview however appears completely asymptomatic.     Procedure(s) (LRB):  EGD (ESOPHAGOGASTRODUODENOSCOPY) (Left)      Hospital Course:   During her short hospital stay patient was treated for severe symptomatic blood loss anemia due to hematemesis secondary to bleeding AVM  in duodenum.  She required 4 units PRBC an EGD with cauterization of bleeding lesions.  In spite of her advanced cardiorenal comorbidities patient did really well.  Her CBC remained stable.  She received dialysis as per schedule.  Her INR was reversed due to severe upper GI bleed.  GI and Cardiology cleared her for Coumadin resumption.  Today she was discharged home in hemodynamically stable condition with advised to resume Coumadin.  We arranged home health.  I provided script for repeat INR on August 21st and August 24th by home health nurse.  She was advised to follow-up with her PCP, cardiologist and dialysis unit.     Instructions provided to follow up with primary care physician as outpatient. Patient verbalized understanding and is aware to contact primary care physician or return to ED if new or worsening symptoms.    Physical exam on the day of discharge:  General: Patient resting comfortably in no acute distress.  Chronically ill-appearing lady  Lungs: CTA. Good air entry.  Cor: Regular rate and rhythm. No murmurs. No pedal edema.  Abd: Soft. Nontender. Non-distended.  Neuro: A&O x3. Moving all 4 extremities equally  Ext: No clubbing. No cyanosis.  Chronic skin changes in bilateral lower extremities     Consults:   Consults (From admission, onward)        Status Ordering Provider     Inpatient consult to Cardiology  Once     Provider:  Katharina Powell MD    Completed CRISTIAN ARAGON     Inpatient consult to Gastroenterology  Once     Provider:  (Not yet assigned)    Acknowledged CRISTIAN ARAGON     Inpatient consult to Hospitalist  Once     Provider:  Cristian Aragon MD    Acknowledged CRISTIAN ARAGON     Inpatient consult to Nephrology  Once     Provider:  Joseph Ingram MD    Acknowledged CRISTIAN ARAGON     Inpatient consult to Registered Dietitian/Nutritionist  Once     Provider:  (Not yet assigned)    Completed CRISTIAN ARAGON          No new Assessment & Plan notes have been  filed under this hospital service since the last note was generated.  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] 06/05/2020 Yes    Anemia [D64.9] 03/17/2020 Yes     Chronic    H/O mitral valve replacement [Z95.2] 03/17/2020 Not Applicable     Chronic    Peripheral vascular disease now with left lower extremity occlusion [I70.90] 03/14/2020 Yes    DNR (do not resuscitate) [Z66] 09/30/2019 Yes     Chronic    Chronic respiratory failure [J96.10] 05/13/2019 Yes     Chronic    Non-rheumatic mitral regurgitation [I34.0] 07/25/2017 Yes    Other persistent atrial fibrillation [I48.19] 07/18/2017 Yes    Coronary arteriosclerosis in native artery [I25.10] 04/04/2017 Yes    ESRD (end stage renal disease) on HD M,W, F [N18.6] 05/11/2016 Yes     Chronic      Problems Resolved During this Admission:    Diagnosis Date Noted Date Resolved POA    PRINCIPAL PROBLEM:  GI bleed [K92.2] 08/17/2020 08/19/2020 Yes       Discharged Condition: fair    Disposition: Home or Self Care    Follow Up:  Follow-up Information     Call Joseph Ingram MD.    Specialty: Nephrology  Why: As needed  Contact information:  [place] [address] [phone]                 Patient Instructions:      Ambulatory referral/consult to Home Health   Standing Status: Future   Referral Priority: Routine Referral Type: Home Health Care   Referral Reason: Specialty Services Required   Requested Specialty: Home Health Services   Number of Visits Requested: 1     Diet renal     Notify your health care provider if you experience any of the following:  temperature >100.4     Notify your health care provider if you experience any of the following:  persistent dizziness, light-headedness, or visual disturbances     Notify your health care provider if you experience any of the following:   Order Comments: Blood in stool, bloody vomiting     Activity as tolerated        Significant Diagnostic Studies: Labs:   CBC   Recent Labs   Lab 08/18/20  0039 08/18/20  0900 08/19/20  0819   WBC 7.70 5.78 6.90   HGB 9.9* 10.5* 10.7*   HCT 28.6* 31.6* 32.8*    208 198       Pending Diagnostic Studies:     None         Medications:  Reconciled Home Medications:      Medication List      START taking these medications    metoprolol succinate 25 MG 24 hr tablet  Commonly known as: TOPROL-XL  Take 1 tablet (25 mg total) by mouth nightly.        CHANGE how you take these medications    warfarin 2.5 MG tablet  Commonly known as: COUMADIN  Take 1 tablet (2.5 mg total) by mouth Daily.  What changed:   · when to take this  · additional instructions  · Another medication with the same name was removed. Continue taking this medication, and follow the directions you see here.        CONTINUE taking these medications    aspirin 81 MG EC tablet  Commonly known as: ECOTRIN  Take 81 mg by mouth once daily.     calcium acetate(phosphat bind) 667 mg capsule  Commonly known as: PHOSLO  Take 667 mg by mouth once daily.     diltiaZEM 180 MG 24 hr capsule  Commonly known as: CARDIZEM CD  Take 180 mg by mouth once daily.     isosorbide mononitrate 30 MG 24 hr tablet  Commonly known as: IMDUR  Take 30 mg by mouth once daily.     losartan 25 MG tablet  Commonly known as: COZAAR  Take 25 mg by mouth once daily.     magnesium oxide 400 mg (241.3 mg magnesium) tablet  Commonly known as: MAG-OX  Take 1 tablet by mouth once daily     pantoprazole 40 MG tablet  Commonly known as: PROTONIX  Take 1 tablet (40 mg total) by mouth 2 (two) times daily.     JENNIFER-KODAK ORAL  Take 1 tablet by mouth once daily.     zinc sulfate 220 mg Tab tablet  Take 220 mg by mouth every other day.        STOP taking these medications    metoprolol tartrate 25 MG tablet  Commonly known as: LOPRESSOR            Indwelling Lines/Drains at time of discharge:   Lines/Drains/Airways     Drain                 Hemodialysis AV Fistula 08/08/19  0214 Right upper arm 377 days                Time spent on the discharge of patient: 37 minutes  Patient was seen and examined on the date of discharge and determined to be suitable for discharge.         Cristian Lopez MD  Department of Hospital Medicine  [facility]

## 2020-08-19 NOTE — PLAN OF CARE
08/19/20 1516   Final Note   Assessment Type Final Discharge Note   Anticipated Discharge Disposition Home   Post-Acute Status   Post-Acute Authorization Other   Other Status No Post-Acute Service Needs   Discharge Delays None known at this time

## 2020-08-19 NOTE — NURSING
74 yr old female   Dressing change to the lt inner thigh   Dressing removed and area cleaned with ns. Very little because pt would not allow. Said it was hurting.   No pictures taken.  Placed aquacel ag, adaptic, 4x4 and kerlex.   Top wound 2x2x.1 pink,red beefy  Lower wound 12x5x.8 pink red beef.

## 2020-08-19 NOTE — PROGRESS NOTES
[facility]  Cardiology  Progress Note    Patient Name: Griselda Neely  MRN: [id number]  Admission Date: 8/17/2020  Hospital Length of Stay: 2 days  Code Status: DNR   Attending Physician: Cristian Lopez MD   Primary Care Physician: Kalie Neely MD  Expected Discharge Date:   Principal Problem:GI bleed    Subjective:       Interval History: Denies any chest pain or shortness of breath. Feels better. Denies any vomiting. Wants to go home.     ROS  Objective:     Vital Signs (Most Recent):  Temp: 97.6 °F (36.4 °C) (08/19/20 0303)  Pulse: 70 (08/19/20 0303)  Resp: 20 (08/19/20 0730)  BP: (!) 188/84 (08/19/20 0303)  SpO2: 98 % (08/19/20 0303) Vital Signs (24h Range):  Temp:  [97.6 °F (36.4 °C)-98.3 °F (36.8 °C)] 97.6 °F (36.4 °C)  Pulse:  [50-83] 70  Resp:  [15-25] 20  SpO2:  [95 %-100 %] 98 %  BP: (122-188)/(63-84) 188/84     Weight: 58 kg (127 lb 13.9 oz)  Body mass index is 21.28 kg/m².    SpO2: 98 %  O2 Device (Oxygen Therapy): nasal cannula      Intake/Output Summary (Last 24 hours) at 8/19/2020 0815  Last data filed at 8/18/2020 1100  Gross per 24 hour   Intake 220 ml   Output --   Net 220 ml       Lines/Drains/Airways     Drain                 Hemodialysis AV Fistula 08/08/19 0214 Right upper arm 377 days          Peripheral Intravenous Line                 Peripheral IV - Single Lumen 08/17/20 0805 20 G Left Wrist 2 days         Peripheral IV - Single Lumen 08/17/20 0900 20 G Left Upper Arm 1 day                Scheduled Meds:   albuterol-ipratropium  3 mL Nebulization Q6H WAKE    calcium acetate(phosphat bind)  667 mg Oral Daily    chlorhexidine  15 mL Mouth/Throat BID    diltiaZEM  180 mg Oral Daily    epoetin alfonzo-epbx  100 Units/kg Subcutaneous Every Mon, Wed, Fri    isosorbide mononitrate  30 mg Oral Daily    magnesium oxide  400 mg Oral Daily    metoprolol tartrate  25 mg Oral BID    mupirocin   Nasal BID    polyethylene glycol  17 g Oral Daily    potassium, sodium  phosphates  1 packet Oral QID (AC & HS)    senna-docusate 8.6-50 mg  1 tablet Oral BID    sodium thiosulfate  0.25 g Intravenous Three Times Weekly     Continuous Infusions:   pantoprozole (PROTONIX) IV infusion 8 mg/hr (08/19/20 0732)     PRN Meds:.sodium chloride, acetaminophen, dextrose 50%, dextrose 50%, diphenhydrAMINE, glucagon (human recombinant), glucose, glucose, HYDROcodone-acetaminophen, insulin aspart U-100, morphine, ondansetron, promethazine (PHENERGAN) IVPB, promethazine (PHENERGAN) IVPB, sodium chloride 0.9%     Physical Exam  HEENT: Normocephalic, atraumatic, PERRL, Conjunctiva pink, no scleral icterus.   CVS: S1S2+, iRRR, SM+, no rubs or gallops, JVP: Normal.  LUNGS: Clear  ABDOMEN: Soft, NT, BS+  EXTREMITIES: No cyanosis, clubbing. + edema  NEURO: AAO X 3.       Significant Labs:   BMP:   Recent Labs   Lab 08/17/20  0835 08/18/20  0900   GLU 79 321*   * 129*   K 6.1* 4.2   CL 93* 90*   CO2 28 25   BUN 71* 40*   CREATININE 6.5* 4.6*   CALCIUM 8.1* 7.4*   MG  --  1.9   , CMP   Recent Labs   Lab 08/17/20  0835 08/18/20  0900   * 129*   K 6.1* 4.2   CL 93* 90*   CO2 28 25   GLU 79 321*   BUN 71* 40*   CREATININE 6.5* 4.6*   CALCIUM 8.1* 7.4*   PROT 5.8* 6.4   ALBUMIN 2.4* 2.8*   BILITOT 0.6 0.9   ALKPHOS 72 72   AST 18 21   ALT 11 12   ANIONGAP 14 14   ESTGFRAFRICA 6.7* 10.2*   EGFRNONAA 5.8* 8.9*   , CBC   Recent Labs   Lab 08/17/20  1752 08/18/20  0039 08/18/20  0900   WBC 6.49 7.70 5.78   HGB 11.0* 9.9* 10.5*   HCT 32.6* 28.6* 31.6*    183 208   , INR   Recent Labs   Lab 08/17/20  0835 08/18/20  0900   INR 3.5 1.2   , Lipid Panel No results for input(s): CHOL, HDL, LDLCALC, TRIG, CHOLHDL in the last 48 hours. and Troponin No results for input(s): TROPONINI in the last 48 hours.    Significant Imaging: Reviewed  Assessment and Plan:     IMPRESSION:  Severe Anemia. Acute on chronic. s/p EGD with AVMs s/p cauterization. H&H accetable     GI bleed.      Congestive heart failure.  Reduced LV systolic function.  Euvolemic on exam.     Mitral regurgitation.  Severe.  With severe calcification.  Not a candidate for percutaneous interventions.  After discussion with Dr. Lora, she decided to be treated only with medical management. Newly reduced LVEF to 35-40% on 4/11/2019. Status post mitral valve replacement with a size 25 mm [employer] bovine pericardial prosthesis via right thoracotomy on by Dr. Lora 3/21/2019.      Atrial fibrillation.  Persistent. Currently in atrial fibrillation with controlled ventricular response by exam.  On coumadin with supratherapeutic INR.       End-stage renal disease on hemodialysis.  MWF. Follows with Dr. Ingram.        Tobacco abuse.  Ongoing despite counseling.     Left atrial thrombus.      CODE STATUS was addressed on 7/16/2020 and the patient was alert, oriented ×3.  She preferred to be a DNI.  She would like to be resuscitated. CODE STATUS was addressed with the presence of her sister and son.     PLAN:  1. Ok to discharge home from Cardiology stand point.   2. Follow up in clinic in 2-3 weeks or sooner if needed.   3. Have INR checked on Friday by HHN and report to me.   4. Discussed with Dr Lopez.       Katharina Powell MD  Cardiology  [facility]

## 2020-08-19 NOTE — HOSPITAL COURSE
During her short hospital stay patient was treated for severe symptomatic blood loss anemia due to hematemesis secondary to bleeding AVM in duodenum.  She required 4 units PRBC an EGD with cauterization of bleeding lesions.  In spite of her advanced cardiorenal comorbidities patient did really well.  Her CBC remained stable.  She received dialysis as per schedule.  Her INR was reversed due to severe upper GI bleed.  GI and Cardiology cleared her for Coumadin resumption.  Today she was discharged home in hemodynamically stable condition with advised to resume Coumadin.  We arranged home health.  I provided script for repeat INR on August 21st and August 24th by home health nurse.  She was advised to follow-up with her PCP, cardiologist and dialysis unit.     Instructions provided to follow up with primary care physician as outpatient. Patient verbalized understanding and is aware to contact primary care physician or return to ED if new or worsening symptoms.    Physical exam on the day of discharge:  General: Patient resting comfortably in no acute distress.  Chronically ill-appearing lady  Lungs: CTA. Good air entry.  Cor: Regular rate and rhythm. No murmurs. No pedal edema.  Abd: Soft. Nontender. Non-distended.  Neuro: A&O x3. Moving all 4 extremities equally  Ext: No clubbing. No cyanosis.  Chronic skin changes in bilateral lower extremities

## 2020-08-19 NOTE — PLAN OF CARE
08/19/20 0823   Patient Assessment/Suction   Level of Consciousness (AVPU) alert   Respiratory Effort Unlabored   All Lung Fields Breath Sounds crackles   PRE-TX-O2   O2 Device (Oxygen Therapy) nasal cannula   $ Is the patient on Low Flow Oxygen? Yes   Flow (L/min) 3   SpO2 100 %   Pulse Oximetry Type Intermittent   $ Pulse Oximetry - Multiple Charge Pulse Oximetry - Multiple   Pulse 70   Resp 17   Aerosol Therapy   $ Aerosol Therapy Charges Aerosol Treatment   Daily Review of Necessity (SVN) completed   Respiratory Treatment Status (SVN) given   Treatment Route (SVN) mask   Patient Position (SVN) HOB elevated   Post Treatment Assessment (SVN) breath sounds unchanged   Signs of Intolerance (SVN) none

## 2020-08-19 NOTE — PLAN OF CARE
08/18/20 2029   Patient Assessment/Suction   Level of Consciousness (AVPU) alert   Respiratory Effort Normal;Unlabored   Expansion/Accessory Muscles/Retractions no use of accessory muscles   All Lung Fields Breath Sounds diminished;clear   Rhythm/Pattern, Respiratory depth regular;pattern regular   Cough Frequency infrequent   Cough Type no productive sputum   PRE-TX-O2   O2 Device (Oxygen Therapy) nasal cannula   $ Is the patient on Low Flow Oxygen? Yes   Flow (L/min) 3   SpO2 98 %   Pulse Oximetry Type Intermittent   $ Pulse Oximetry - Multiple Charge Pulse Oximetry - Multiple   Pulse (!) 50   Resp 16   Positioning   Body Position positioned/repositioned independently   Head of Bed (HOB) HOB elevated   Aerosol Therapy   $ Aerosol Therapy Charges Aerosol Treatment   Daily Review of Necessity (SVN) completed   Respiratory Treatment Status (SVN) given   Treatment Route (SVN) oxygen;mask   Patient Position (SVN) HOB elevated   Post Treatment Assessment (SVN) breath sounds improved   Signs of Intolerance (SVN) none   Breath Sounds Post-Respiratory Treatment   Throughout All Fields Post-Treatment All Fields   Throughout All Fields Post-Treatment aeration increased   Post-treatment Heart Rate (beats/min) 51   Post-treatment Resp Rate (breaths/min) 16   Respiratory Evaluation   $ Care Plan Tech Time 15 min

## 2020-08-19 NOTE — PROGRESS NOTES
Blood count stable  Will sign off  Okay for anticoagulation if needed and monitor h/h while inpatient and outpatient

## 2020-08-19 NOTE — NURSING
Patient being discharged to home. Wheeled out via wheelchair where sister picked her up. All personal belongings returned. Peripheral IV's taken out, monitor removed.

## 2020-08-19 NOTE — PROGRESS NOTES
[facility]  Nephrology  Progress Note    Patient Name: Griselda Neely  MRN: [id number]  Admission Date: 8/17/2020  Hospital Length of Stay: 2 days  Attending Provider: Cristian Lopez MD   Primary Care Physician: Kalie Neely MD  Principal Problem:GI bleed    Consults  Subjective:     Interval History:     Sitting up in bed  Feels better today  H/h stable  Renal chemistries stable          Review of patient's allergies indicates:  No Known Allergies  Current Facility-Administered Medications   Medication Frequency    0.9%  NaCl infusion (for blood administration) Q24H PRN    acetaminophen tablet 650 mg Q4H PRN    albuterol-ipratropium 2.5 mg-0.5 mg/3 mL nebulizer solution 3 mL Q6H WAKE    calcium acetate(phosphat bind) capsule 667 mg Daily    chlorhexidine 0.12 % solution 15 mL BID    dextrose 50% injection 12.5 g PRN    dextrose 50% injection 25 g PRN    diltiaZEM 24 hr capsule 180 mg Daily    diphenhydrAMINE injection 25 mg Q4H PRN    epoetin alfonzo-epbx injection 6,100 Units Every Mon, Wed, Fri    glucagon (human recombinant) injection 1 mg PRN    glucose chewable tablet 16 g PRN    glucose chewable tablet 24 g PRN    HYDROcodone-acetaminophen 5-325 mg per tablet 1 tablet Q6H PRN    insulin aspart U-100 pen 0-5 Units QID (AC + HS) PRN    isosorbide mononitrate 24 hr tablet 30 mg Daily    magnesium oxide tablet 400 mg Daily    metoprolol tartrate (LOPRESSOR) tablet 25 mg BID    morphine injection 2 mg Q4H PRN    mupirocin 2 % ointment BID    ondansetron injection 4 mg Q6H PRN    pantoprazole EC tablet 40 mg BID    polyethylene glycol packet 17 g Daily    potassium, sodium phosphates 280-160-250 mg packet 1 packet QID (AC & HS)    promethazine (PHENERGAN) 6.25 mg in dextrose 5 % 50 mL IVPB Q6H PRN    promethazine (PHENERGAN) 6.25 mg in dextrose 5 % 50 mL IVPB Q10 Min PRN    senna-docusate 8.6-50 mg per tablet 1 tablet BID    sodium chloride 0.9% flush 10 mL PRN     sodium thiosulfate 12.5 gram/50 mL (250 mg/mL) injection 0.25 g Three Times Weekly       Objective:     Vital Signs (Most Recent):  Temp: 98.2 °F (36.8 °C) (08/19/20 1103)  Pulse: 67 (08/19/20 1103)  Resp: 20 (08/19/20 1103)  BP: (!) 111/57 (08/19/20 1103)  SpO2: 98 % (08/19/20 1103)  O2 Device (Oxygen Therapy): nasal cannula (08/19/20 1103) Vital Signs (24h Range):  Temp:  [97.6 °F (36.4 °C)-98.3 °F (36.8 °C)] 98.2 °F (36.8 °C)  Pulse:  [50-78] 67  Resp:  [16-24] 20  SpO2:  [95 %-100 %] 98 %  BP: (111-188)/(57-84) 111/57     Weight: 58 kg (127 lb 13.9 oz) (08/17/20 1901)  Body mass index is 21.28 kg/m².  Body surface area is 1.63 meters squared.    I/O last 3 completed shifts:  In: 659.7 [P.O.:360; I.V.:299.7]  Out: -     Physical Exam  Vitals signs and nursing note reviewed.   Constitutional:       Appearance: Normal appearance.   HENT:      Head:      Comments: No JVD     Mouth/Throat:      Mouth: Mucous membranes are moist.   Neck:      Musculoskeletal: Normal range of motion and neck supple.   Cardiovascular:      Rate and Rhythm: Normal rate.      Pulses: Normal pulses.      Comments: irregular  Pulmonary:      Effort: Pulmonary effort is normal. No respiratory distress.      Breath sounds: Normal breath sounds.   Skin:     Comments: Rash (dry skin; hyperpigmented; B U&L Ext's) present.      Comments: Right arm:  AVF +bruit/thrill   Neurological:      Mental Status: She is alert and oriented to person, place, and time.   Psychiatric:         Mood and Affect: Mood normal.         Behavior: Behavior normal.         Significant Labs:sure  CBC:   Recent Labs   Lab 08/19/20  0819   WBC 6.90   RBC 3.51*   HGB 10.7*   HCT 32.8*      MCV 93   MCH 30.5   MCHC 32.6     CMP:   Recent Labs   Lab 08/19/20  0819   *  141*   CALCIUM 7.9*  7.9*   ALBUMIN 2.9*  2.9*   PROT 6.5   *  134*   K 4.7  4.7   CO2 25  25   CL 93*  93*   BUN 56*  56*   CREATININE 6.0*  6.0*   ALKPHOS 81   ALT 11   AST 20    BILITOT 1.0     All labs within the past 24 hours have been reviewed.    Significant Imaging:      Assessment/Plan:     Active Diagnoses:    Diagnosis Date Noted POA    PRINCIPAL PROBLEM:  GI bleed [K92.2] 08/17/2020 Yes    Calciphylaxis [E83.59] 06/05/2020 Yes    Anemia [D64.9] 03/17/2020 Yes     Chronic    H/O mitral valve replacement [Z95.2] 03/17/2020 Not Applicable     Chronic    Peripheral vascular disease now with left lower extremity occlusion [I70.90] 03/14/2020 Yes    DNR (do not resuscitate) [Z66] 09/30/2019 Yes     Chronic    Chronic respiratory failure [J96.10] 05/13/2019 Yes     Chronic    Non-rheumatic mitral regurgitation [I34.0] 07/25/2017 Yes    Other persistent atrial fibrillation [I48.19] 07/18/2017 Yes    Coronary arteriosclerosis in native artery [I25.10] 04/04/2017 Yes    ESRD (end stage renal disease) on HD M,W, F [N18.6] 05/11/2016 Yes     Chronic      Problems Resolved During this Admission:       Assessment and Plan:        1.  ESRD- HD M-W-F schedule; Potassium normalized;  Low k/phos diet, strict I&O, daily weight, renal dose all meds appropriate GFR, No BP/IV stick right arm.  Monitor renal chemistries.       2.  Anemia- stable h/h.  EPO with dialysis.   Duodenal bleeds were treated and ablated per GI. Resuming coumadin after 24 hours.   Monitor H/H closely and outpatient coloscopy.        3. Renal BMD- Calcium corrects 8.4 for low albumin; phos improved; taking Ca Acetate; currently treated for Calciphylaxis with Sodium Thiosulfate.       4.  Hypertension-   O.K for now; resume BP meds; goal SBP <140>90; titrate as needed.    5.  Hyponatremia- ; challenge UF with HD; No hypotonic fluids; limit free water.       5.  GI Bleed- Defer to GI               Gabe Barksdale NP  Nephrology  [facility]

## 2020-08-20 ENCOUNTER — TELEPHONE (OUTPATIENT)
Dept: FAMILY MEDICINE | Facility: CLINIC | Age: 75
End: 2020-08-20

## 2020-08-20 NOTE — PLAN OF CARE
08/20/20 1450   Final Note   Assessment Type Final Discharge Note   Anticipated Discharge Disposition Home-Health   Spoke with patient about Freedom of Choice Form, explained to patient and family that they have the right to choose any agency, and a list of agencies was provided to patient and family to review, they verbalized an understanding, Received phone consent and form scanned into CM notes.  Sent home health referral to [facility].

## 2020-08-24 ENCOUNTER — DOCUMENT SCAN (OUTPATIENT)
Dept: HOME HEALTH SERVICES | Facility: HOSPITAL | Age: 75
End: 2020-08-24
Payer: MEDICARE

## 2020-08-24 NOTE — CARE UPDATE
Juana Bose from [facility] called to find out if home health is coming out because no one came out yet. Called [facility] and talked to Dayron who said patient is on schedule for admit today 8/24/2020. Called back Ms. Arias to let her know that [facility] will be out to see her today. [facility]  Juana Bose Number is [phone]

## 2020-08-25 ENCOUNTER — DOCUMENT SCAN (OUTPATIENT)
Dept: HOME HEALTH SERVICES | Facility: HOSPITAL | Age: 75
End: 2020-08-25
Payer: MEDICARE

## 2020-09-01 ENCOUNTER — HOSPITAL ENCOUNTER (EMERGENCY)
Facility: HOSPITAL | Age: 75
Discharge: LEFT BEFORE DISCHARGE | End: 2020-09-01
Attending: EMERGENCY MEDICINE
Payer: MEDICARE

## 2020-09-01 ENCOUNTER — EXTERNAL HOME HEALTH (OUTPATIENT)
Dept: HOME HEALTH SERVICES | Facility: HOSPITAL | Age: 75
End: 2020-09-01

## 2020-09-01 VITALS
WEIGHT: 120 LBS | SYSTOLIC BLOOD PRESSURE: 123 MMHG | DIASTOLIC BLOOD PRESSURE: 82 MMHG | HEART RATE: 130 BPM | HEIGHT: 65 IN | TEMPERATURE: 98 F | RESPIRATION RATE: 17 BRPM | BODY MASS INDEX: 19.99 KG/M2 | OXYGEN SATURATION: 98 %

## 2020-09-01 DIAGNOSIS — S00.83XA TRAUMATIC HEMATOMA OF FOREHEAD, INITIAL ENCOUNTER: ICD-10-CM

## 2020-09-01 DIAGNOSIS — Z53.29 LEFT AGAINST MEDICAL ADVICE: Primary | ICD-10-CM

## 2020-09-01 PROCEDURE — 99284 EMERGENCY DEPT VISIT MOD MDM: CPT | Mod: 25

## 2020-09-01 RX ORDER — TRAMADOL HYDROCHLORIDE 50 MG/1
50 TABLET ORAL EVERY 8 HOURS PRN
COMMUNITY
End: 2020-10-26

## 2020-09-01 RX ORDER — OXYCODONE AND ACETAMINOPHEN 5; 325 MG/1; MG/1
1 TABLET ORAL EVERY 6 HOURS PRN
COMMUNITY
End: 2021-03-16

## 2020-09-01 RX ORDER — WARFARIN SODIUM 5 MG/1
5 TABLET ORAL DAILY
COMMUNITY
End: 2020-10-01

## 2020-09-01 RX ORDER — ONDANSETRON 4 MG/1
4 TABLET, ORALLY DISINTEGRATING ORAL EVERY 6 HOURS PRN
COMMUNITY
End: 2021-01-01

## 2020-09-01 NOTE — DISCHARGE INSTRUCTIONS
You are leaving against medical advice.  You are risking heart attack, brain damage, permanent disability, chronic pain and DEATH.  Please return at any time if you change your mind regarding our recommended care.  Please see your physician as soon as possible.

## 2020-09-01 NOTE — ED NOTES
"Attempting to eval pt and place pt on cardiac monitor. Pt refusing to undress and states "I just came here to have my head scanned" Pt refusing bloodwork and ECG. Sister at bedside and agrees with pt.   "

## 2020-09-02 NOTE — ED PROVIDER NOTES
Encounter Date: 9/1/2020       History     Chief Complaint   Patient presents with    Fall     This is a 75-year-old female with multiple medical problems on Coumadin who presents with complaints of tripping falling, striking her head on the ground with subsequent swelling to her forehead.  She states that she tripped and fell.  She did not feel that she passed out.  She has mild pain to the forehead area.  She denies any other injuries problems or complaints.  Symptoms were mild in nature.  She has a dry cough that she has had for several days denies chest pain.  She has chronic shortness of breath and denies any sudden change or worsening in this.  She denies any focal weakness numbness tingling or paresthesias.  She denies any other problems or complaints.        Review of patient's allergies indicates:  No Known Allergies  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    both legs    CHF (congestive heart failure)     Encounter for blood transfusion 03/2016    Gout     Hemodialysis access, AV graft     Hypertension     Mitral valve regurgitation     Osteoarthritis     Pancreatitis     Peripheral vascular disease     Pneumonia 09/09/2017    Renal failure      Past Surgical History:   Procedure Laterality Date    ACHILLES TENDON SURGERY Right     ANGIOGRAPHY OF ARTERIOVENOUS SHUNT Right 1/7/2020    Procedure: Fistulogram with Possible Intervention;  Surgeon: Joseph Loyola MD;  Location: [facility] CATH/EP LAB;  Service: Cardiology;  Laterality: Right;    ANGIOGRAPHY OF LOWER EXTREMITY Left 3/18/2020    Procedure: Angiogram Extremity Unilateral;  Surgeon: Ali Khoobehi, MD;  Location: [facility] CATH/EP LAB;  Service: Cardiology;  Laterality: Left;    APPENDECTOMY      CARDIAC CATHETERIZATION  07/03/2017    CHOLECYSTECTOMY      ESOPHAGOGASTRODUODENOSCOPY Left 8/17/2020    Procedure: EGD (ESOPHAGOGASTRODUODENOSCOPY);  Surgeon: Talat Trevizo MD;  Location: [facility] ENDO;   Service: Endoscopy;  Laterality: Left;    heal surgery Right     HYSTERECTOMY      INSERTION OF STENT INTO PERIPHERAL VESSEL N/A 1/7/2020    Procedure: INSERTION, STENT, VESSEL, PERIPHERAL;  Surgeon: Joseph Loyola MD;  Location: [facility] CATH/EP LAB;  Service: Cardiology;  Laterality: N/A;    MITRAL VALVE REPLACEMENT      PARATHYROIDECTOMY      PARATHYROIDECTOMY  07/13/2017    PERCUTANEOUS TRANSLUMINAL ANGIOPLASTY OF ARTERIOVENOUS FISTULA N/A 1/7/2020    Procedure: PTA, AV FISTULA;  Surgeon: Joseph Loyola MD;  Location: [facility] CATH/EP LAB;  Service: Cardiology;  Laterality: N/A;    PERITONEAL CATHETER INSERTION      RENAL BIOPSY      vocal cord nodule      WOUND DEBRIDEMENT Left 7/13/2020    Procedure: DEBRIDEMENT, WOUND;  Surgeon: Carlos Elam III, MD;  Location: [facility] OR;  Service: General;  Laterality: Left;     Family History   Problem Relation Age of Onset    Heart disease Sister     Early death Sister         heart as baby    Heart disease Maternal Grandfather     Diabetes Mother     Hypertension Mother     Heart failure Mother     Heart disease Mother     Arthritis Mother     Diabetes Father     Early death Sister         infant     Social History     Tobacco Use    Smoking status: Current Some Day Smoker     Packs/day: 0.50     Years: 45.00     Pack years: 22.50     Types: Cigarettes    Smokeless tobacco: Never Used   Substance Use Topics    Alcohol use: No    Drug use: No     Review of Systems   Constitutional: Negative.  Negative for activity change, appetite change, chills, fatigue and fever.   HENT: Negative.  Negative for congestion, dental problem, ear pain, rhinorrhea, sinus pressure, sinus pain, sore throat and trouble swallowing.    Eyes: Negative.  Negative for photophobia, pain, redness and visual disturbance.   Respiratory: Positive for cough. Negative for chest tightness, shortness of breath and wheezing.    Cardiovascular: Negative.  Negative for chest pain,  palpitations and leg swelling.   Gastrointestinal: Negative.  Negative for abdominal distention, abdominal pain, anal bleeding, blood in stool, constipation, diarrhea, nausea and vomiting.   Endocrine: Negative.    Genitourinary: Negative.  Negative for decreased urine volume, difficulty urinating, dysuria, flank pain, frequency, hematuria, pelvic pain and urgency.   Musculoskeletal: Negative.  Negative for arthralgias, back pain, gait problem, joint swelling, myalgias, neck pain and neck stiffness.   Skin: Negative.  Negative for color change, pallor and rash.   Neurological: Negative.  Negative for dizziness, tremors, seizures, syncope, facial asymmetry, speech difficulty, weakness, light-headedness, numbness and headaches.   Hematological: Negative.  Does not bruise/bleed easily.   Psychiatric/Behavioral: Negative.  Negative for confusion.   All other systems reviewed and are negative.      Physical Exam     Initial Vitals [09/01/20 0856]   BP Pulse Resp Temp SpO2   110/76 102 17 98.1 °F (36.7 °C) 99 %      MAP       --         Physical Exam    Nursing note and vitals reviewed.  Constitutional: She is active and cooperative.  Non-toxic appearance. She does not have a sickly appearance. She does not appear ill. No distress.   HENT:   Head: Normocephalic and atraumatic.   Right Ear: Tympanic membrane normal.   Left Ear: Tympanic membrane normal.   Nose: Nose normal.   Mouth/Throat: Uvula is midline, oropharynx is clear and moist and mucous membranes are normal. No oral lesions. No uvula swelling. No oropharyngeal exudate, posterior oropharyngeal edema or posterior oropharyngeal erythema.   Eyes: Conjunctivae, EOM and lids are normal. Pupils are equal, round, and reactive to light. No scleral icterus.   Neck: Trachea normal, normal range of motion, full passive range of motion without pain and phonation normal. Neck supple. No thyroid mass and no thyromegaly present. No stridor present. No spinous process tenderness  and no muscular tenderness present. No tracheal deviation, no edema, no erythema and normal range of motion present. No neck rigidity. No JVD present.   Cardiovascular: Regular rhythm, normal heart sounds, intact distal pulses and normal pulses. Tachycardia present.  Exam reveals no gallop, no distant heart sounds and no friction rub.    No murmur heard.  Pulmonary/Chest: Effort normal. No accessory muscle usage or stridor. No tachypnea. No respiratory distress. She has decreased breath sounds. She has no wheezes. She has rhonchi. She has no rales.   Abdominal: Soft. Normal appearance and bowel sounds are normal. She exhibits no distension, no pulsatile midline mass and no mass. There is no abdominal tenderness. There is no rigidity, no guarding and no CVA tenderness.   Musculoskeletal: Normal range of motion. No tenderness or edema.      Cervical back: Normal. She exhibits normal range of motion, no tenderness, no bony tenderness and no swelling.      Thoracic back: Normal. She exhibits normal range of motion, no tenderness, no bony tenderness and no swelling.      Lumbar back: Normal. She exhibits normal range of motion, no tenderness, no bony tenderness and no swelling.      Comments: Pulses are 2+ throughout, cap refill is less than 2 sec throughout, extremities are nontender throughout with full range of motion. There is no spinal tenderness to palpation.  Left great toe appears ecchymotic with tenderness to palpation.  The toe is not warm fluctuant and is not cold to palpation.   Lymphadenopathy:     She has no cervical adenopathy.   Neurological: She is alert and oriented to person, place, and time. She has normal strength. She displays normal reflexes. No cranial nerve deficit or sensory deficit.   No focal deficits.   Skin: Skin is warm, dry and intact. Capillary refill takes less than 2 seconds. No ecchymosis, no petechiae and no rash noted. No erythema. No pallor.   Psychiatric: She has a normal mood and  affect. Her speech is normal and behavior is normal. Judgment and thought content normal. Cognition and memory are normal.         ED Course   Procedures  Labs Reviewed - No data to display       Imaging Results          CT Cervical Spine Without Contrast (Final result)  Result time 09/01/20 10:00:47    Final result by Keily Hernandez MD (09/01/20 10:00:47)                 Narrative:    All CT scans at this facility used dose modulation, iterative  reconstruction and/or weight-based dosing when appropriate to reduce  radiation doses  as low as reasonably achievable.    CT scan of the cervical spine    Clinical history is Neck trauma, mechanically unstable    Axial images the cervical spine were obtained with sagittal and  coronal reconstructed images. The cervical spine is in satisfactory  alignment. The vertebral bodies are of normal height. There is no  fracture or subluxation. The facet joints are aligned. The odontoid  process is intact the cranial cervical junction is normal. There is no  spinal stenosis or foraminal narrowing. The paraspinous soft tissues  are normal. There is moderate vascular calcification at the carotid  bifurcations. The paraspinous soft tissues are normal.    There are emphysematous changes in the upper lobes.    IMPRESSION: Normal CT scan of the cervical spine    Moderate vascular calcification bilaterally.    Electronically Signed by Keily Hernandez M.D. on 9/1/2020 10:12 AM                             CT Head Without Contrast (Final result)  Result time 09/01/20 09:45:41    Final result by Harish Liu MD (09/01/20 09:45:41)                 Narrative:    CMS MANDATED QUALITY DATA - CT RADIATION  436    All CT scans at this facility utilize dose modulation, iterative  reconstruction, and/or weight based dosing when appropriate to reduce  radiation dose to as low as reasonably achievable.    CT HEAD WITHOUT CONTRAST    CLINICAL HISTORY:  75 years Female Head trauma, minor (Age  => 66y)    COMPARISON: April 22, 2020    FINDINGS: Negative for acute intracranial hemorrhage, midline shift,  or mass effect. Ventricles and sulci are normal in size.  Periventricular and deep white matter hypoattenuation consistent with  microangiopathic change, stable from prior. Focal hypodensity  involving anterior limb of left lentiform nucleus and corona radiata  consistent with sequela of remote infarct, stable from prior. Stable  calcifications within the right cerebral hemisphere. Cerebellar  hemispheres and brainstem are unremarkable. Atherosclerotic  calcification of the cavernous carotid arteries.    No calvarial lesion or fracture. Visualized paranasal sinuses and  mastoid air cells are clear. Large right frontal scalp hematoma.    IMPRESSION:    No CT evidence of acute intracranial pathology.    Large right frontal scalp hematoma.    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    Electronically Signed by Harish CARCAMO on 9/1/2020 9:59 AM                               Medical Decision Making:   Clinical Tests:   Radiological Study: Reviewed  ED Management:  Patient refused any evaluation other than undergoing a CT scan of her head and cervical spine.  I explained the patient that she appeared to be in atrial fibrillation with rapid ventricular response, explained that her lung sounds were not normal.  I explained the need for diagnostic lab test as well as x-rays and likely admission to the hospital based on hemodynamics.  The patient was awake, alert and oriented x4.  She has a normal mental status exam.  She is not exhibiting any evidence of intoxication and is demonstrating adequate decision making capabilities.  She is refusing any in all further care other than the CT scan of the head.  Risk of leaving against medical advice have been explained in detail including permanent disability, brain damage, chronic pain, heart attack and death.   Patient voices understanding.  I have urged the patient to return at any time if she changed her mind regarding our care and I have urged the patient to follow up closely outpatient with her primary care physician and cardiologist.  The patient is leaving against medical advice                                 Clinical Impression:       ICD-10-CM ICD-9-CM   1. Left against medical advice  Z53.20 V64.2   2. Traumatic hematoma of forehead, initial encounter  S00.83XA 920             ED Disposition Condition    AMA                           Nereida Sauer MD  09/01/20 [id number]

## 2020-09-05 ENCOUNTER — HOSPITAL ENCOUNTER (INPATIENT)
Facility: HOSPITAL | Age: 75
LOS: 1 days | Discharge: HOME HEALTH | DRG: 189 | End: 2020-09-06
Attending: EMERGENCY MEDICINE | Admitting: INTERNAL MEDICINE
Payer: MEDICARE

## 2020-09-05 DIAGNOSIS — I10 POORLY-CONTROLLED HYPERTENSION: ICD-10-CM

## 2020-09-05 DIAGNOSIS — I50.9 CONGESTIVE HEART FAILURE, UNSPECIFIED HF CHRONICITY, UNSPECIFIED HEART FAILURE TYPE: ICD-10-CM

## 2020-09-05 DIAGNOSIS — I50.9 ACUTE ON CHRONIC HEART FAILURE, UNSPECIFIED HEART FAILURE TYPE: ICD-10-CM

## 2020-09-05 DIAGNOSIS — J96.00 ACUTE RESPIRATORY FAILURE: ICD-10-CM

## 2020-09-05 DIAGNOSIS — J96.20 ACUTE ON CHRONIC RESPIRATORY FAILURE: ICD-10-CM

## 2020-09-05 DIAGNOSIS — I48.91 ATRIAL FIBRILLATION WITH RVR: Primary | ICD-10-CM

## 2020-09-05 DIAGNOSIS — I51.3 ATRIAL THROMBUS: ICD-10-CM

## 2020-09-05 DIAGNOSIS — R07.9 CHEST PAIN: ICD-10-CM

## 2020-09-05 DIAGNOSIS — R06.02 SHORTNESS OF BREATH: ICD-10-CM

## 2020-09-05 DIAGNOSIS — J96.01 ACUTE RESPIRATORY FAILURE WITH HYPOXIA: ICD-10-CM

## 2020-09-05 DIAGNOSIS — J44.1 COPD WITH ACUTE EXACERBATION: ICD-10-CM

## 2020-09-05 DIAGNOSIS — J96.21 ACUTE ON CHRONIC RESPIRATORY FAILURE WITH HYPOXIA: ICD-10-CM

## 2020-09-05 PROBLEM — Z79.01 LONG TERM CURRENT USE OF ANTICOAGULANT: Status: ACTIVE | Noted: 2020-09-05

## 2020-09-05 LAB
ALBUMIN SERPL BCP-MCNC: 3.1 G/DL (ref 3.5–5.2)
ALP SERPL-CCNC: 140 U/L (ref 55–135)
ALT SERPL W/O P-5'-P-CCNC: 24 U/L (ref 10–44)
ANION GAP SERPL CALC-SCNC: 12 MMOL/L (ref 8–16)
AST SERPL-CCNC: 43 U/L (ref 10–40)
BASOPHILS # BLD AUTO: 0.03 K/UL (ref 0–0.2)
BASOPHILS NFR BLD: 0.4 % (ref 0–1.9)
BILIRUB SERPL-MCNC: 1.2 MG/DL (ref 0.1–1)
BNP SERPL-MCNC: >4500 PG/ML (ref 0–99)
BUN SERPL-MCNC: 35 MG/DL (ref 8–23)
CALCIUM SERPL-MCNC: 8.6 MG/DL (ref 8.7–10.5)
CHLORIDE SERPL-SCNC: 94 MMOL/L (ref 95–110)
CO2 SERPL-SCNC: 33 MMOL/L (ref 23–29)
CREAT SERPL-MCNC: 5.1 MG/DL (ref 0.5–1.4)
DIFFERENTIAL METHOD: ABNORMAL
EOSINOPHIL # BLD AUTO: 0.1 K/UL (ref 0–0.5)
EOSINOPHIL NFR BLD: 0.7 % (ref 0–8)
ERYTHROCYTE [DISTWIDTH] IN BLOOD BY AUTOMATED COUNT: 16 % (ref 11.5–14.5)
EST. GFR  (AFRICAN AMERICAN): 8.9 ML/MIN/1.73 M^2
EST. GFR  (NON AFRICAN AMERICAN): 7.8 ML/MIN/1.73 M^2
GLUCOSE SERPL-MCNC: 83 MG/DL (ref 70–110)
HCT VFR BLD AUTO: 34.9 % (ref 37–48.5)
HGB BLD-MCNC: 10.6 G/DL (ref 12–16)
IMM GRANULOCYTES # BLD AUTO: 0.03 K/UL (ref 0–0.04)
IMM GRANULOCYTES NFR BLD AUTO: 0.4 % (ref 0–0.5)
INR PPP: 1.3
LYMPHOCYTES # BLD AUTO: 0.8 K/UL (ref 1–4.8)
LYMPHOCYTES NFR BLD: 11 % (ref 18–48)
MAGNESIUM SERPL-MCNC: 1.9 MG/DL (ref 1.6–2.6)
MCH RBC QN AUTO: 28.6 PG (ref 27–31)
MCHC RBC AUTO-ENTMCNC: 30.4 G/DL (ref 32–36)
MCV RBC AUTO: 94 FL (ref 82–98)
MONOCYTES # BLD AUTO: 0.5 K/UL (ref 0.3–1)
MONOCYTES NFR BLD: 6.4 % (ref 4–15)
NEUTROPHILS # BLD AUTO: 5.7 K/UL (ref 1.8–7.7)
NEUTROPHILS NFR BLD: 81.1 % (ref 38–73)
NRBC BLD-RTO: 0 /100 WBC
PLATELET # BLD AUTO: 263 K/UL (ref 150–350)
PMV BLD AUTO: 10.4 FL (ref 9.2–12.9)
POTASSIUM SERPL-SCNC: 4.3 MMOL/L (ref 3.5–5.1)
PROT SERPL-MCNC: 7.7 G/DL (ref 6–8.4)
PROTHROMBIN TIME: 15.7 SEC (ref 10.6–14.8)
RBC # BLD AUTO: 3.71 M/UL (ref 4–5.4)
SARS-COV-2 RDRP RESP QL NAA+PROBE: NEGATIVE
SODIUM SERPL-SCNC: 139 MMOL/L (ref 136–145)
TROPONIN I SERPL DL<=0.01 NG/ML-MCNC: 0.03 NG/ML
TROPONIN I SERPL DL<=0.01 NG/ML-MCNC: 0.04 NG/ML
WBC # BLD AUTO: 7.02 K/UL (ref 3.9–12.7)

## 2020-09-05 PROCEDURE — 83880 ASSAY OF NATRIURETIC PEPTIDE: CPT

## 2020-09-05 PROCEDURE — U0002 COVID-19 LAB TEST NON-CDC: HCPCS

## 2020-09-05 PROCEDURE — 99223 1ST HOSP IP/OBS HIGH 75: CPT | Mod: GC,,, | Performed by: INTERNAL MEDICINE

## 2020-09-05 PROCEDURE — 99900035 HC TECH TIME PER 15 MIN (STAT)

## 2020-09-05 PROCEDURE — 83735 ASSAY OF MAGNESIUM: CPT

## 2020-09-05 PROCEDURE — 93010 ELECTROCARDIOGRAM REPORT: CPT | Mod: ,,, | Performed by: INTERNAL MEDICINE

## 2020-09-05 PROCEDURE — 90935 HEMODIALYSIS ONE EVALUATION: CPT

## 2020-09-05 PROCEDURE — 80053 COMPREHEN METABOLIC PANEL: CPT

## 2020-09-05 PROCEDURE — 85025 COMPLETE CBC W/AUTO DIFF WBC: CPT

## 2020-09-05 PROCEDURE — 93005 ELECTROCARDIOGRAM TRACING: CPT | Performed by: INTERNAL MEDICINE

## 2020-09-05 PROCEDURE — 84484 ASSAY OF TROPONIN QUANT: CPT

## 2020-09-05 PROCEDURE — 20000000 HC ICU ROOM

## 2020-09-05 PROCEDURE — 25000003 PHARM REV CODE 250: Performed by: EMERGENCY MEDICINE

## 2020-09-05 PROCEDURE — 25000003 PHARM REV CODE 250: Performed by: PHYSICAL MEDICINE & REHABILITATION

## 2020-09-05 PROCEDURE — 84484 ASSAY OF TROPONIN QUANT: CPT | Mod: 91

## 2020-09-05 PROCEDURE — 85610 PROTHROMBIN TIME: CPT

## 2020-09-05 PROCEDURE — 99223 PR INITIAL HOSPITAL CARE,LEVL III: ICD-10-PCS | Mod: GC,,, | Performed by: INTERNAL MEDICINE

## 2020-09-05 PROCEDURE — 93010 EKG 12-LEAD: ICD-10-PCS | Mod: 76,,, | Performed by: INTERNAL MEDICINE

## 2020-09-05 PROCEDURE — 36415 COLL VENOUS BLD VENIPUNCTURE: CPT

## 2020-09-05 PROCEDURE — 93010 ELECTROCARDIOGRAM REPORT: CPT | Mod: 76,,, | Performed by: INTERNAL MEDICINE

## 2020-09-05 PROCEDURE — 94660 CPAP INITIATION&MGMT: CPT

## 2020-09-05 PROCEDURE — 87340 HEPATITIS B SURFACE AG IA: CPT

## 2020-09-05 PROCEDURE — 99291 CRITICAL CARE FIRST HOUR: CPT

## 2020-09-05 PROCEDURE — 25000003 PHARM REV CODE 250: Performed by: INTERNAL MEDICINE

## 2020-09-05 PROCEDURE — 86706 HEP B SURFACE ANTIBODY: CPT

## 2020-09-05 PROCEDURE — 27000221 HC OXYGEN, UP TO 24 HOURS

## 2020-09-05 RX ORDER — IBUPROFEN 200 MG
16 TABLET ORAL
Status: DISCONTINUED | OUTPATIENT
Start: 2020-09-05 | End: 2020-09-06 | Stop reason: HOSPADM

## 2020-09-05 RX ORDER — ISOSORBIDE MONONITRATE 30 MG/1
30 TABLET, EXTENDED RELEASE ORAL DAILY
Status: DISCONTINUED | OUTPATIENT
Start: 2020-09-05 | End: 2020-09-06 | Stop reason: HOSPADM

## 2020-09-05 RX ORDER — LOSARTAN POTASSIUM 25 MG/1
25 TABLET ORAL DAILY
Status: DISCONTINUED | OUTPATIENT
Start: 2020-09-05 | End: 2020-09-06 | Stop reason: HOSPADM

## 2020-09-05 RX ORDER — LANOLIN ALCOHOL/MO/W.PET/CERES
400 CREAM (GRAM) TOPICAL DAILY
Status: DISCONTINUED | OUTPATIENT
Start: 2020-09-05 | End: 2020-09-06 | Stop reason: HOSPADM

## 2020-09-05 RX ORDER — ONDANSETRON 2 MG/ML
4 INJECTION INTRAMUSCULAR; INTRAVENOUS EVERY 8 HOURS PRN
Status: DISCONTINUED | OUTPATIENT
Start: 2020-09-05 | End: 2020-09-06 | Stop reason: HOSPADM

## 2020-09-05 RX ORDER — WARFARIN 2.5 MG/1
2.5 TABLET ORAL DAILY
Status: DISCONTINUED | OUTPATIENT
Start: 2020-09-05 | End: 2020-09-06 | Stop reason: HOSPADM

## 2020-09-05 RX ORDER — TRAMADOL HYDROCHLORIDE 50 MG/1
50 TABLET ORAL EVERY 8 HOURS PRN
Status: DISCONTINUED | OUTPATIENT
Start: 2020-09-05 | End: 2020-09-06 | Stop reason: HOSPADM

## 2020-09-05 RX ORDER — SODIUM CHLORIDE 0.9 % (FLUSH) 0.9 %
10 SYRINGE (ML) INJECTION
Status: DISCONTINUED | OUTPATIENT
Start: 2020-09-05 | End: 2020-09-06 | Stop reason: HOSPADM

## 2020-09-05 RX ORDER — POLYETHYLENE GLYCOL 3350 17 G/17G
17 POWDER, FOR SOLUTION ORAL DAILY PRN
Status: DISCONTINUED | OUTPATIENT
Start: 2020-09-05 | End: 2020-09-06 | Stop reason: HOSPADM

## 2020-09-05 RX ORDER — NALOXONE HYDROCHLORIDE 4 MG/.1ML
SPRAY NASAL
COMMUNITY
Start: 2020-08-04 | End: 2020-09-08

## 2020-09-05 RX ORDER — METOPROLOL TARTRATE 1 MG/ML
2.5 INJECTION, SOLUTION INTRAVENOUS
Status: COMPLETED | OUTPATIENT
Start: 2020-09-05 | End: 2020-09-05

## 2020-09-05 RX ORDER — DILTIAZEM HCL 1 MG/ML
5 INJECTION, SOLUTION INTRAVENOUS CONTINUOUS
Status: DISCONTINUED | OUTPATIENT
Start: 2020-09-05 | End: 2020-09-06

## 2020-09-05 RX ORDER — CALCIUM ACETATE 667 MG/1
667 CAPSULE ORAL DAILY
Status: DISCONTINUED | OUTPATIENT
Start: 2020-09-05 | End: 2020-09-06 | Stop reason: HOSPADM

## 2020-09-05 RX ORDER — OXYCODONE AND ACETAMINOPHEN 5; 325 MG/1; MG/1
1 TABLET ORAL EVERY 6 HOURS PRN
Status: DISCONTINUED | OUTPATIENT
Start: 2020-09-05 | End: 2020-09-06 | Stop reason: HOSPADM

## 2020-09-05 RX ORDER — SODIUM CHLORIDE 9 MG/ML
INJECTION, SOLUTION INTRAVENOUS
Status: DISCONTINUED | OUTPATIENT
Start: 2020-09-05 | End: 2020-09-06 | Stop reason: HOSPADM

## 2020-09-05 RX ORDER — DILTIAZEM HYDROCHLORIDE 5 MG/ML
10 INJECTION INTRAVENOUS
Status: COMPLETED | OUTPATIENT
Start: 2020-09-05 | End: 2020-09-05

## 2020-09-05 RX ORDER — MUPIROCIN 20 MG/G
OINTMENT TOPICAL 2 TIMES DAILY
Status: DISCONTINUED | OUTPATIENT
Start: 2020-09-05 | End: 2020-09-06 | Stop reason: HOSPADM

## 2020-09-05 RX ORDER — ACETAMINOPHEN 325 MG/1
650 TABLET ORAL EVERY 4 HOURS PRN
Status: DISCONTINUED | OUTPATIENT
Start: 2020-09-05 | End: 2020-09-06 | Stop reason: HOSPADM

## 2020-09-05 RX ORDER — METOPROLOL SUCCINATE 25 MG/1
25 TABLET, EXTENDED RELEASE ORAL NIGHTLY
Status: DISCONTINUED | OUTPATIENT
Start: 2020-09-05 | End: 2020-09-06 | Stop reason: HOSPADM

## 2020-09-05 RX ORDER — SODIUM CHLORIDE 9 MG/ML
INJECTION, SOLUTION INTRAVENOUS ONCE
Status: DISCONTINUED | OUTPATIENT
Start: 2020-09-05 | End: 2020-09-06 | Stop reason: HOSPADM

## 2020-09-05 RX ORDER — IBUPROFEN 200 MG
24 TABLET ORAL
Status: DISCONTINUED | OUTPATIENT
Start: 2020-09-05 | End: 2020-09-06 | Stop reason: HOSPADM

## 2020-09-05 RX ORDER — ASPIRIN 81 MG/1
81 TABLET ORAL DAILY
Status: DISCONTINUED | OUTPATIENT
Start: 2020-09-05 | End: 2020-09-06 | Stop reason: HOSPADM

## 2020-09-05 RX ORDER — ACETAMINOPHEN 325 MG/1
650 TABLET ORAL EVERY 8 HOURS PRN
Status: DISCONTINUED | OUTPATIENT
Start: 2020-09-05 | End: 2020-09-06 | Stop reason: HOSPADM

## 2020-09-05 RX ORDER — PANTOPRAZOLE SODIUM 40 MG/1
40 TABLET, DELAYED RELEASE ORAL 2 TIMES DAILY
Status: DISCONTINUED | OUTPATIENT
Start: 2020-09-05 | End: 2020-09-06 | Stop reason: HOSPADM

## 2020-09-05 RX ADMIN — DILTIAZEM HYDROCHLORIDE 10 MG: 5 INJECTION INTRAVENOUS at 12:09

## 2020-09-05 RX ADMIN — METOPROLOL TARTRATE 2.5 MG: 5 INJECTION, SOLUTION INTRAVENOUS at 01:09

## 2020-09-05 RX ADMIN — PANTOPRAZOLE SODIUM 40 MG: 40 TABLET, DELAYED RELEASE ORAL at 10:09

## 2020-09-05 RX ADMIN — OXYCODONE AND ACETAMINOPHEN 1 TABLET: 5; 325 TABLET ORAL at 10:09

## 2020-09-05 RX ADMIN — OXYCODONE AND ACETAMINOPHEN 1 TABLET: 5; 325 TABLET ORAL at 04:09

## 2020-09-05 RX ADMIN — METOPROLOL SUCCINATE 25 MG: 25 TABLET, FILM COATED, EXTENDED RELEASE ORAL at 10:09

## 2020-09-05 RX ADMIN — WARFARIN SODIUM 2.5 MG: 2.5 TABLET ORAL at 10:09

## 2020-09-05 RX ADMIN — DILTIAZEM HYDROCHLORIDE 5 MG/HR: 5 INJECTION INTRAVENOUS at 12:09

## 2020-09-05 RX ADMIN — MUPIROCIN: 20 OINTMENT TOPICAL at 10:09

## 2020-09-05 NOTE — NURSING
Pt still refuses iv.  Sweetened oj given to pt.  Will follow up on glucose.  Dr wilder is aware.   patient is d/c by TESS Masters

## 2020-09-05 NOTE — ED PROVIDER NOTES
Encounter Date: 9/5/2020       History     Chief Complaint   Patient presents with    Shortness of Breath     THIS AM     75-year-old female has a history of chronic kidney disease on hemodialysis for 3 years, CHF, gout, hypertension, pancreatitis, PVD, COPD, pneumonia, gout, and anemia, presents with complaints of shortness of breath.  The patient last had dialysis yesterday.  She is not aware of a normal dry weight is.  The patient in reviewing or records also has had atrial fib.  She is unclear as to what her medications are at this time.  She denies any complaints of chest pain.  She does complain of shortness of breath and palpitations.  She denies any history of any thyroid disease.  No fever.  She denies any chest pain, nausea, vomiting.  The patient's weight has been stable.  She is aneuric.  She has had some occasional cough productive of clear sputum and she continues to smoke.  No alcohol use.        Review of patient's allergies indicates:  No Known Allergies  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    both legs    CHF (congestive heart failure)     Encounter for blood transfusion 03/2016    Gout     Hemodialysis access, AV graft     Hypertension     Mitral valve regurgitation     Osteoarthritis     Pancreatitis     Peripheral vascular disease     Pneumonia 09/09/2017    Renal failure      Past Surgical History:   Procedure Laterality Date    ACHILLES TENDON SURGERY Right     ANGIOGRAPHY OF ARTERIOVENOUS SHUNT Right 1/7/2020    Procedure: Fistulogram with Possible Intervention;  Surgeon: Joseph Loyola MD;  Location: [facility] CATH/EP LAB;  Service: Cardiology;  Laterality: Right;    ANGIOGRAPHY OF LOWER EXTREMITY Left 3/18/2020    Procedure: Angiogram Extremity Unilateral;  Surgeon: Ali Khoobehi, MD;  Location: [facility] CATH/EP LAB;  Service: Cardiology;  Laterality: Left;    APPENDECTOMY      CARDIAC CATHETERIZATION  07/03/2017    CHOLECYSTECTOMY       ESOPHAGOGASTRODUODENOSCOPY Left 8/17/2020    Procedure: EGD (ESOPHAGOGASTRODUODENOSCOPY);  Surgeon: Talat Trevizo MD;  Location: [facility] ENDO;  Service: Endoscopy;  Laterality: Left;    heal surgery Right     HYSTERECTOMY      INSERTION OF STENT INTO PERIPHERAL VESSEL N/A 1/7/2020    Procedure: INSERTION, STENT, VESSEL, PERIPHERAL;  Surgeon: Joseph Loyola MD;  Location: [facility] CATH/EP LAB;  Service: Cardiology;  Laterality: N/A;    MITRAL VALVE REPLACEMENT      PARATHYROIDECTOMY      PARATHYROIDECTOMY  07/13/2017    PERCUTANEOUS TRANSLUMINAL ANGIOPLASTY OF ARTERIOVENOUS FISTULA N/A 1/7/2020    Procedure: PTA, AV FISTULA;  Surgeon: Joseph Loyola MD;  Location: [facility] CATH/EP LAB;  Service: Cardiology;  Laterality: N/A;    PERITONEAL CATHETER INSERTION      RENAL BIOPSY      vocal cord nodule      WOUND DEBRIDEMENT Left 7/13/2020    Procedure: DEBRIDEMENT, WOUND;  Surgeon: Carlos Elam III, MD;  Location: [facility] OR;  Service: General;  Laterality: Left;     Family History   Problem Relation Age of Onset    Heart disease Sister     Early death Sister         heart as baby    Heart disease Maternal Grandfather     Diabetes Mother     Hypertension Mother     Heart failure Mother     Heart disease Mother     Arthritis Mother     Diabetes Father     Early death Sister         infant     Social History     Tobacco Use    Smoking status: Current Some Day Smoker     Packs/day: 0.50     Years: 45.00     Pack years: 22.50     Types: Cigarettes    Smokeless tobacco: Never Used   Substance Use Topics    Alcohol use: No    Drug use: No     Review of Systems   Constitutional: Negative for fever.   HENT: Negative for sore throat.    Respiratory: Negative for shortness of breath.    Cardiovascular: Negative for chest pain.   Gastrointestinal: Negative for nausea.   Genitourinary: Negative for dysuria.   Musculoskeletal: Negative for back pain.   Skin: Negative for rash.   Neurological: Negative for  weakness.   Hematological: Does not bruise/bleed easily.   All other systems reviewed and are negative.      Physical Exam     Initial Vitals   BP Pulse Resp Temp SpO2   -- -- -- -- --      MAP       --         Physical Exam    ED Course   Critical Care    Date/Time: 9/5/2020 12:44 PM  Performed by: Kenji Sanford Jr., MD  Authorized by: Kenji Sanford Jr., MD   Direct patient critical care time: 40 minutes  Ordering / reviewing critical care time: 10 minutes  Consulting other physicians critical care time: 5 minutes  Total critical care time (exclusive of procedural time) : 55 minutes        Labs Reviewed   CBC W/ AUTO DIFFERENTIAL   COMPREHENSIVE METABOLIC PANEL   B-TYPE NATRIURETIC PEPTIDE   TROPONIN I   PROTIME-INR   SARS-COV-2 RNA AMPLIFICATION, QUAL          Imaging Results    None                       Attending Attestation:             Attending ED Notes:   75-year-old female who has a history of chronic kidney disease on dialysis was dialyzed yesterday, presents complaining of shortness of breath.  The patient is found to have been in atrial fib with a rapid ventricular response.  She was started on Cardizem had some improvement rate as well as blood pressure.  The patient's chest x-ray looks much worse than that last done in August of this year.  There is diffuse infiltrate.  The patient's BNP is over 4500.  The patient is aneuric.  She will require admission to a telemetry a floor for titrating Cardizem.  BiPAP has been started and the patient will be followed by Hospital Medicine.                        Clinical Impression:       ICD-10-CM ICD-9-CM   1. Atrial fibrillation with RVR  I48.91 427.31   2. Shortness of breath  R06.02 786.05   3. COPD with acute exacerbation  J44.1 491.21   4. Congestive heart failure, unspecified HF chronicity, unspecified heart failure type  I50.9 428.0   5. Poorly-controlled hypertension  I10 401.9                                Kenji Sanford Jr., MD  09/05/20  [id number]

## 2020-09-05 NOTE — ED NOTES
Patient states IV hurts, BP cuff was misplaced, readjusted and IV flushes with ease and without pain.

## 2020-09-05 NOTE — ED PROVIDER NOTES
Encounter Date: 9/5/2020       History   No chief complaint on file.    Presents with SOB  Onset this AM  Pt reports she woke with SOB  Last dialysis  was yesterday         Review of patient's allergies indicates:  No Known Allergies  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    both legs    CHF (congestive heart failure)     Encounter for blood transfusion 03/2016    Gout     Hemodialysis access, AV graft     Hypertension     Mitral valve regurgitation     Osteoarthritis     Pancreatitis     Peripheral vascular disease     Pneumonia 09/09/2017    Renal failure      Past Surgical History:   Procedure Laterality Date    ACHILLES TENDON SURGERY Right     ANGIOGRAPHY OF ARTERIOVENOUS SHUNT Right 1/7/2020    Procedure: Fistulogram with Possible Intervention;  Surgeon: Jsoeph Loyola MD;  Location: [facility] CATH/EP LAB;  Service: Cardiology;  Laterality: Right;    ANGIOGRAPHY OF LOWER EXTREMITY Left 3/18/2020    Procedure: Angiogram Extremity Unilateral;  Surgeon: Ali Khoobehi, MD;  Location: [facility] CATH/EP LAB;  Service: Cardiology;  Laterality: Left;    APPENDECTOMY      CARDIAC CATHETERIZATION  07/03/2017    CHOLECYSTECTOMY      ESOPHAGOGASTRODUODENOSCOPY Left 8/17/2020    Procedure: EGD (ESOPHAGOGASTRODUODENOSCOPY);  Surgeon: Talat Trevizo MD;  Location: [facility] ENDO;  Service: Endoscopy;  Laterality: Left;    heal surgery Right     HYSTERECTOMY      INSERTION OF STENT INTO PERIPHERAL VESSEL N/A 1/7/2020    Procedure: INSERTION, STENT, VESSEL, PERIPHERAL;  Surgeon: Joseph Loyola MD;  Location: [facility] CATH/EP LAB;  Service: Cardiology;  Laterality: N/A;    MITRAL VALVE REPLACEMENT      PARATHYROIDECTOMY      PARATHYROIDECTOMY  07/13/2017    PERCUTANEOUS TRANSLUMINAL ANGIOPLASTY OF ARTERIOVENOUS FISTULA N/A 1/7/2020    Procedure: PTA, AV FISTULA;  Surgeon: Joseph Loyola MD;  Location: [facility] CATH/EP LAB;  Service: Cardiology;  Laterality: N/A;    PERITONEAL  CATHETER INSERTION      RENAL BIOPSY      vocal cord nodule      WOUND DEBRIDEMENT Left 7/13/2020    Procedure: DEBRIDEMENT, WOUND;  Surgeon: Carlos Elam III, MD;  Location: [facility] [place] OR;  Service: General;  Laterality: Left;     Family History   Problem Relation Age of Onset    Heart disease Sister     Early death Sister         heart as baby    Heart disease Maternal Grandfather     Diabetes Mother     Hypertension Mother     Heart failure Mother     Heart disease Mother     Arthritis Mother     Diabetes Father     Early death Sister         infant     Social History     Tobacco Use    Smoking status: Current Some Day Smoker     Packs/day: 0.50     Years: 45.00     Pack years: 22.50     Types: Cigarettes    Smokeless tobacco: Never Used   Substance Use Topics    Alcohol use: No    Drug use: No     Review of Systems   Constitutional: Negative for chills and fever.   HENT: Positive for congestion. Negative for sore throat and trouble swallowing.    Respiratory: Positive for shortness of breath. Negative for cough and wheezing.    Cardiovascular: Negative for chest pain, palpitations and leg swelling.   Gastrointestinal: Negative for abdominal pain, diarrhea, nausea and vomiting.   Genitourinary: Negative for dysuria.   Musculoskeletal: Negative for back pain.   Skin: Negative for rash.   Neurological: Negative for dizziness, weakness and headaches.       Physical Exam     Initial Vitals   BP Pulse Resp Temp SpO2   -- -- -- -- --      MAP       --         Physical Exam    Constitutional: She appears well-developed and well-nourished.   HENT:   Head: Normocephalic and atraumatic.   Mouth/Throat: Oropharynx is clear and moist.   Eyes: Conjunctivae are normal.   Neck: Normal range of motion. Neck supple.   Cardiovascular: Normal rate and regular rhythm.   Pulmonary/Chest: Breath sounds normal. No respiratory distress. She has no wheezes. She has no rhonchi.   Pt appears SOB on 5 liters of O2   "  Abdominal: Soft. Bowel sounds are normal. She exhibits no distension. There is no abdominal tenderness.   Musculoskeletal: Normal range of motion.      Comments: Pt needed assistance to get into the bed  Pt usually walks with a walker+ 2 edema to lower ext .    Neurological: She is alert and oriented to person, place, and time. GCS score is 15. GCS eye subscore is 4. GCS verbal subscore is 5. GCS motor subscore is 6.   Skin: Skin is warm and dry. Capillary refill takes less than 2 seconds.   Psychiatric: She has a normal mood and affect. Thought content normal.         ED Course   Procedures  Labs Reviewed   CBC W/ AUTO DIFFERENTIAL   COMPREHENSIVE METABOLIC PANEL   B-TYPE NATRIURETIC PEPTIDE   TROPONIN I   PROTIME-INR          Imaging Results    None          Medical Decision Making:   Initial Assessment:   SOB Onset this AM  Pt is currently on 5 liters of O2 from home  States her sister " turned it up " from her usual 2 liters.  ED Management:  Care of pt transferred to Dr. Claribel Sanford in to examine pt                                  Clinical Impression:       ICD-10-CM ICD-9-CM   1. Atrial fibrillation with RVR  I48.91 427.31   2. Shortness of breath  R06.02 786.05   3. COPD with acute exacerbation  J44.1 491.21   4. Congestive heart failure, unspecified HF chronicity, unspecified heart failure type  I50.9 428.0   5. Poorly-controlled hypertension  I10 401.9   6. Chest pain  R07.9 786.50   7. Acute respiratory failure  J96.00 518.81   8. Acute on chronic respiratory failure with hypoxia  J96.21 518.84     799.02   9. Acute on chronic heart failure, unspecified heart failure type  I50.9 428.0   10. Acute respiratory failure with hypoxia  J96.01 518.81   11. Acute on chronic respiratory failure  J96.20 518.84                                Kayli Devries NP  09/05/20 2045    "

## 2020-09-05 NOTE — CONSULTS
[facility]  Department of Cardiology  Consult Note      PATIENT NAME: Griselda Neely  MRN: [id number]  TODAY'S DATE: 09/05/2020  ADMIT DATE: 9/5/2020                          CONSULT REQUESTED BY: Twan Yu MD    SUBJECTIVE     PRINCIPAL PROBLEM: Acute on chronic respiratory failure      REASON FOR CONSULT:  AFIB with RVR  CHF       HPI:  75 year old female patient followed by Dr. Powell  She has a history of f Anemia, Atrial fibrillation, Calciphylaxis (07/2017), CHF (congestive heart failure), Encounter for blood transfusion (03/2016), Gout, Hemodialysis access, AV graft, Hypertension, Mitral valve regurgitation, Osteoarthritis, Pancreatitis, Peripheral vascular disease, Pneumonia (09/09/2017), and Renal failure.  She went to Dialysis yesterday and stayed the whole time and was doing well last night  She woke up this am with SOB that was severe   EMS Called   Upon arrival to ER Sats in 80s Bipap initiated  AFIB with RVR and HTN  Patient seen in the ER          FROM H AND P      Patient information was obtained from patient, past medical records and ER records.   Case personally discussed with Dr. Sanford in the ER, Dr. KIKE Asif and Dr. Ingram  HISTORY OF PRESENT ILLNESS:   Griselda Neely is a 75 y.o. AAF female who  has a past medical history of Anemia, Atrial fibrillation, Calciphylaxis (07/2017), CHF (congestive heart failure), Encounter for blood transfusion (03/2016), Gout, Hemodialysis access, AV graft, Hypertension, Mitral valve regurgitation, Osteoarthritis, Pancreatitis, Peripheral vascular disease, Pneumonia (09/09/2017), and Renal failure.. The patient presented to [facility] on 9/5/2020 with a primary complaint of Shortness of Breath (THIS AM)     Patient presented to ER with complaint of sudden onset of shortness of breath this morning.  EMS was called, her initial O2 sat was in 80s.  She was brought to the ER.  Upon arrival, blood pressure was 132/80, respiration  28, heart rate 144, temperature 97.8°.  Patient was started on BiPAP and O2 sat improved to 100%.  Patient was given Cardizem time to IV with no improvement and Cardizem drip was initiated.  Patient heart rate remained in 140s and 130s on Cardizem drip.  Sister informed me that patient had her hemodialysis yesterday as scheduled.  X-ray chest indicates fluid overload.  Patient does not create urine.      After my evaluation, patient called the nurse and informed her that she feels she is having a heart attack.  Her blood pressure was high in 170s over 100's.  I re-evaluated the patient again in the ER.  Dr. KIKE Asif was consulted.  Troponin and stat EKG ordered.  Dr. KIKE Asif evaluated the patient in the ER.  Ordered metoprolol 2.5 mg IV x1 which improved the heart rate as well as her blood pressure improved to 140s over 80s  I also called Dr. Ingram, discussed the case, he will place the dialysis order for ultrafiltration.  Patient will be admitted to ICU per Dr. KIKE Asif as advised     Noted CBC shows H&H 10.6/34.9 with platelet 263  PT INR 15.7/1.3 (subtherapeutic)  CMP shows chloride 94, bicarb 33, BUN/creatinine 35/5.1 with GFR 8.9, alk-phos 140, albumin 3.1, T bili 1.2, AST 43, ALT 24  BNP greater than 4500, troponin 0.036  COVID rapid negative  X-ray personally reviewed shows cardiomegaly and pulmonary edema  EKG personally reviewed shows AFib with RVR and T-wave abnormality           Review of patient's allergies indicates:  No Known Allergies    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    both legs    CHF (congestive heart failure)     Encounter for blood transfusion 03/2016    Gout     Hemodialysis access, AV graft     Hypertension     Mitral valve regurgitation     Osteoarthritis     Pancreatitis     Peripheral vascular disease     Pneumonia 09/09/2017    Renal failure      Past Surgical History:   Procedure Laterality Date     ACHILLES TENDON SURGERY Right     ANGIOGRAPHY OF ARTERIOVENOUS SHUNT Right 1/7/2020    Procedure: Fistulogram with Possible Intervention;  Surgeon: Joseph Loyola MD;  Location: [facility] CATH/EP LAB;  Service: Cardiology;  Laterality: Right;    ANGIOGRAPHY OF LOWER EXTREMITY Left 3/18/2020    Procedure: Angiogram Extremity Unilateral;  Surgeon: Ali Khoobehi, MD;  Location: [facility] CATH/EP LAB;  Service: Cardiology;  Laterality: Left;    APPENDECTOMY      CARDIAC CATHETERIZATION  07/03/2017    CHOLECYSTECTOMY      ESOPHAGOGASTRODUODENOSCOPY Left 8/17/2020    Procedure: EGD (ESOPHAGOGASTRODUODENOSCOPY);  Surgeon: Talat Trevizo MD;  Location: [facility] ENDO;  Service: Endoscopy;  Laterality: Left;    heal surgery Right     HYSTERECTOMY      INSERTION OF STENT INTO PERIPHERAL VESSEL N/A 1/7/2020    Procedure: INSERTION, STENT, VESSEL, PERIPHERAL;  Surgeon: Joseph Loyola MD;  Location: [facility] CATH/EP LAB;  Service: Cardiology;  Laterality: N/A;    MITRAL VALVE REPLACEMENT      PARATHYROIDECTOMY      PARATHYROIDECTOMY  07/13/2017    PERCUTANEOUS TRANSLUMINAL ANGIOPLASTY OF ARTERIOVENOUS FISTULA N/A 1/7/2020    Procedure: PTA, AV FISTULA;  Surgeon: Joseph Loyola MD;  Location: [facility] CATH/EP LAB;  Service: Cardiology;  Laterality: N/A;    PERITONEAL CATHETER INSERTION      RENAL BIOPSY      vocal cord nodule      WOUND DEBRIDEMENT Left 7/13/2020    Procedure: DEBRIDEMENT, WOUND;  Surgeon: Carlos Elam III, MD;  Location: [facility] OR;  Service: General;  Laterality: Left;     Social History     Tobacco Use    Smoking status: Current Some Day Smoker     Packs/day: 0.50     Years: 45.00     Pack years: 22.50     Types: Cigarettes    Smokeless tobacco: Never Used   Substance Use Topics    Alcohol use: No    Drug use: No        REVIEW OF SYSTEMS  CONSTITUTIONAL: Negative for chills, fatigue and fever.  Apparently patient had a recent fall with bilateral periorbital swelling noted.  Along with ecchymosis  EYES:  No double vision, No blurred vision  NEURO: No headaches, No dizziness  RESPIRATORY: sob+   CARDIOVASCULAR: Negative for chest pain. Negative for palpitations and leg swelling.   GI: Negative for abdominal pain, No melena, diarrhea, nausea and vomiting.   : Negative for dysuria and frequency, Negative for hematuria  SKIN: Negative for bruising, Negative for edema or discoloration noted.   ENDOCRINE: Negative for polyphagia, Negative for heat intolerance, Negative for cold intolerance  PSYCHIATRIC: Negative for depression, Negative for anxiety, Negative for memory loss  MUSCULOSKELETAL: Negative for neck pain, Negative for muscle weakness, Negative for back pain     OBJECTIVE     VITAL SIGNS (Most Recent)  Temp: 97.8 °F (36.6 °C) (09/05/20 1059)  Pulse: (!) 125 (09/05/20 1230)  Resp: (!) 24 (09/05/20 1230)  BP: (!) 150/86 (09/05/20 1230)  SpO2: 100 % (09/05/20 1230)    VENTILATION STATUS  Resp: (!) 24 (09/05/20 1230)  SpO2: 100 % (09/05/20 1230)       I & O (Last 24H):No intake or output data in the 24 hours ending 09/05/20 1446    WEIGHTS  Wt Readings from Last 1 Encounters:   09/05/20 1059 54.4 kg (120 lb)       PHYSICAL EXAM  GENERAL: thin female on bipap  HEENT: Normocephalic. Pupils normal and conjunctivae normal.  Mucous membranes normal, no cyanosis or icterus, trachea central,no pallor or icterus is noted..  Bilateral periorbital edema and ecchymosis is present.  NECK: No JVD. No bruit..   THYROID: Thyroid not enlarged. No nodules present..   CARDIAC: IRR  CHEST ANATOMY: normal.   LUNGS: scattered crackles bilaterally  ABDOMEN: Soft no masses or organomegaly.  No abdomen pulsations or bruits.  Normal bowel sounds. No pulsations and no masses felt, No guarding or rebound.   URINARY: No núñez catheter   EXTREMITIES: No cyanosis, clubbing or edema noted at this time., no calf tenderness bilaterally.   PERIPHERAL VASCULAR SYSTEM: Good palpable distal pulses.   CENTRAL NERVOUS SYSTEM: No focal motor or sensory  deficits noted.   SKIN: Skin without lesions, moist, well perfused.   MUSCLE STRENGTH & TONE: No noteable weakness, atrophy or abnormal movement.     HOME MEDICATIONS:  No current facility-administered medications on file prior to encounter.      Current Outpatient Medications on File Prior to Encounter   Medication Sig Dispense Refill    aspirin (ECOTRIN) 81 MG EC tablet Take 81 mg by mouth once daily.      calcium acetate (PHOSLO) 667 mg capsule Take 667 mg by mouth once daily.       diltiaZEM (CARDIZEM CD) 180 MG 24 hr capsule Take 180 mg by mouth once daily.      folic acid/vit B complex and C (JENNIFER-KODAK ORAL) Take 1 tablet by mouth once daily.      isosorbide mononitrate (IMDUR) 30 MG 24 hr tablet Take 30 mg by mouth once daily.      losartan (COZAAR) 25 MG tablet Take 25 mg by mouth once daily.      magnesium oxide (MAG-OX) 400 mg (241.3 mg magnesium) tablet Take 1 tablet by mouth once daily 30 tablet 0    metoprolol succinate (TOPROL-XL) 25 MG 24 hr tablet Take 1 tablet (25 mg total) by mouth nightly. 30 tablet 0    ondansetron (ZOFRAN-ODT) 4 MG TbDL Take 4 mg by mouth every 6 (six) hours as needed.      oxyCODONE-acetaminophen (PERCOCET) 5-325 mg per tablet Take 1 tablet by mouth every 6 (six) hours as needed for Pain.      pantoprazole (PROTONIX) 40 MG tablet Take 1 tablet (40 mg total) by mouth 2 (two) times daily. 60 tablet 0    traMADoL (ULTRAM) 50 mg tablet Take 50 mg by mouth every 8 (eight) hours as needed for Pain.      warfarin (COUMADIN) 5 MG tablet Take 5 mg by mouth once daily.      zinc sulfate 220 mg Tab tablet Take 220 mg by mouth every other day.      NARCAN 4 mg/actuation Louann CALL 911. ADMINISTER A SINGLE SPRAY INTRANASALLY INTO ONE NOSTRIL UPON SIGNS OF OPIOID OVERDOSE. MAY REPEAT AFTER 3 MINUTES IF NO RESPONSE.      warfarin (COUMADIN) 2.5 MG tablet Take 1 tablet (2.5 mg total) by mouth Daily. 30 tablet 0       SCHEDULED MEDS:   aspirin  81 mg Oral Daily    calcium  acetate(phosphat bind)  667 mg Oral Daily    isosorbide mononitrate  30 mg Oral Daily    losartan  25 mg Oral Daily    magnesium oxide  400 mg Oral Daily    metoprolol succinate  25 mg Oral Nightly    pantoprazole  40 mg Oral BID    warfarin  2.5 mg Oral Daily       CONTINUOUS INFUSIONS:   dilTIAZem 10 mg/hr (09/05/20 1245)       PRN MEDS:acetaminophen, acetaminophen, dextrose 50%, dextrose 50%, glucose, glucose, ondansetron, oxyCODONE-acetaminophen, polyethylene glycol, sodium chloride 0.9%, traMADoL    LABS AND DIAGNOSTICS     CBC LAST 3 DAYS  Recent Labs   Lab 09/05/20  1128   WBC 7.02   RBC 3.71*   HGB 10.6*   HCT 34.9*   MCV 94   MCH 28.6   MCHC 30.4*   RDW 16.0*      MPV 10.4   GRAN 81.1*  5.7   LYMPH 11.0*  0.8*   MONO 6.4  0.5   BASO 0.03   NRBC 0       COAGULATION LAST 3 DAYS  Recent Labs   Lab 09/05/20  1128   LABPT 15.7*   INR 1.3       CHEMISTRY LAST 3 DAYS  Recent Labs   Lab 09/05/20  1128 09/05/20  1321     --    K 4.3  --    CL 94*  --    CO2 33*  --    ANIONGAP 12  --    BUN 35*  --    CREATININE 5.1*  --    GLU 83  --    CALCIUM 8.6*  --    MG  --  1.9   ALBUMIN 3.1*  --    PROT 7.7  --    ALKPHOS 140*  --    ALT 24  --    AST 43*  --    BILITOT 1.2*  --        CARDIAC PROFILE LAST 3 DAYS  Recent Labs   Lab 09/05/20  1128 09/05/20  1321   BNP >4,500*  --    TROPONINI 0.036 0.031       ENDOCRINE LAST 3 DAYS  No results for input(s): TSH, PROCAL in the last 168 hours.    LAST ARTERIAL BLOOD GAS  ABG  No results for input(s): PH, PO2, PCO2, HCO3, BE in the last 168 hours.    LAST 7 DAYS MICROBIOLOGY   Microbiology Results (last 7 days)     ** No results found for the last 168 hours. **          MOST RECENT IMAGING  X-Ray Chest AP Portable  HISTORY: Dyspnea, CHF.    FINDINGS: Portable chest radiograph at 1139 hours compared to  08/17/2020 shows the cardiac silhouette is moderately enlarged, stable  in size, with the pulmonary vasculature mildly prominent centrally,  stable. There  are aortic vascular calcifications and changes of prior  valvuloplasty.    There are scattered reticulonodular densities throughout both lungs,  with right midlung and bilateral lower lung zone airspace opacities,  as well as small bilateral pleural effusions, obscuring the diaphragm.  There is no pneumothorax. The bones are diffusely osteopenic.    IMPRESSION:  1. Mild scattered interstitial opacities in both lungs, potentially  pulmonary edema and or hypervolemia, or viral or atypical pneumonia.  2. Nonspecific lower lung airspace opacities suggesting atelectasis  and or pneumonia, with small bilateral pleural effusions.  3. Stable moderate cardiomegaly.    Electronically Signed by Brian CARCAMO on 9/5/2020 12:15 PM      ECHOCARDIOGRAM RESULTS (last 5)  Results for orders placed during the hospital encounter of 07/22/20   Transesophageal echo (TRUPTI)    Narrative Indication:   Left atrial thrombus.     Counseling:   Patient as well as her family member were informed of the risks, benefits   as well as alternatives to the procedure and informed consent was   obtained.     Procedure:  After obtaining informed consent the patient was brought in to the cath   lab in a fasting state and with an IV in place. Patient was sedated with   anesthesia team help. A TRUPTI probe was advanced without difficulty and   images were obtained. Patient tolerated the procedure well and no   immediate complications were noted. Patient was transferred out of the   heart center in stable medical condition.     Complications:   None     Findings:  1.  Large echogenic mass  in the roof of the left atrium seen on previous   TRUPTI has resolved. Significant amount of smoke and possible soft thrombus   noted in the left atrial appendage extending into the left atrium.  2.  Mitral valve bioprosthesis in stable position.  Leaflets appear to be   moving well.  Mild mitral regurgitation noted.  3.  Mild tricuspid regurgitation noted.  4. Aortic valve  appears calcified. There appears to be mild restrictions   of leaflet motion. No significant regurgitation.   5. Pulmonic valve was not well visualized.   6.  No obvious immediate complications.    Conclusions:  1.  Large echogenic mass in the roof of the left atrium seen on previous   TRUPTI has resolved. Significant amount of smoke and possible soft thrombus   noted in the left atrial appendage extending into the left atrium.     Results for orders placed during the hospital encounter of 01/02/20   Transesophageal echo (TRUPTI)    Narrative 1.  Large echogenic mass likely representing clot noted in the roof of the   left atrium and in the left atrial appendage.  2.  Mitral valve bioprosthesis in stable position.  Leaflets appear to be   moving well.  Mild mitral regurgitation noted.  3.  Mild tricuspid regurgitation noted.  4.  Mild plaque noted in the visualized portions of the descending   thoracic aorta.  5.  No obvious immediate complications.   Echo Color Flow Doppler? Yes    Narrative · Eccentric left ventricular hypertrophy.  · Severely decreased left ventricular systolic function. The estimated   ejection fraction is 25%.  · Grade IV (severe) left ventricular diastolic dysfunction.  · Moderate right ventricular enlargement.  · Moderately reduced right ventricular systolic function.  · Severe left atrial enlargement.  · Layered left atrial thrombus suggested. The thrombus is fixed and   located in the inferior cavity.  · Moderate right atrial enlargement.  · There is a bioprosthetic mitral valve. Prosthetic mitral valve is   normal.  · Moderate tricuspid regurgitation.  · Mild to moderate pulmonary hypertension present. Estimated PA systolic   pressure 50 mm of Hg.  · Mild to moderate pulmonic regurgitation.  · Small posterior pericardial effusion.      Results for orders placed during the hospital encounter of 11/13/17   2D echo with color flow doppler    Narrative Date of Procedure: 11/13/2017        TEST  DESCRIPTION   Technical Quality: This is a technically adequate study.     Aorta: The aortic root is normal in size, measuring 2.1 cm at sinotubular junction and 2.7 cm at Sinuses of Valsalva. The proximal ascending aorta is normal in size, measuring 3.0 cm across.     Left Atrium: The left atrial volume index is severely enlarged, measuring 118.16 cc/m2.     Left Ventricle: The left ventricle is normal in size, with an end-diastolic diameter of 5.4 cm, and an end-systolic diameter of 3.5 cm. LV wall thickness is normal, with the septum and the posterior wall each measuring 0.9 cm across. Relative wall   thickness was normal at 0.33, and the LV mass index was increased at 136.0 g/m2 consistent with severe eccentric left ventricular hypertrophy. There are no regional wall motion abnormalities. Left ventricular systolic function appears hyperdynamic.   Visually estimated ejection fraction is >70%.         Right Atrium: The right atrium is normal in size, measuring 4.5 cm in length and 2.8 cm in width in the apical view.     Right Ventricle: The right ventricle is normal in size measuring 4.6 cm at the base in the apical right ventricle-focused view. Global right ventricular systolic function appears normal. Tricuspid annular plane systolic excursion (TAPSE) is 2.5 cm.   Tissue Doppler-derived tricuspid annular peak systolic velocity (S prime) is 15.3 cm/s. The estimated PA systolic pressure is 29 mmHg.     Aortic Valve:  The aortic valve is mildly sclerotic with normal leaflet mobility.     Mitral Valve:  The mitral valve is mildly sclerotic with mildly restricted leaflet mobility. The mean gradient obtained across the mitral valve is 8 mmHg. The pressure half time is 112.0 msec. The calculated mitral valve area is 1.96 cm2 consistent with   mild mitral stenosis. There is moderate to severe mitral regurgitation. There is marked mitral annular calcification.     Tricuspid Valve:  The tricuspid valve is normal in  structure. There is mild tricuspid regurgitation.     Pulmonary Valve:  The pulmonic valve is normal in structure.     IVC: IVC is normal in size and collapses > 50% with a sniff, suggesting normal right atrial pressure of 3 mmHg.     Intracavitary: There is no evidence of pericardial effusion, intracavity mass, thrombi, or vegetation.         CONCLUSIONS     1 - Eccentric LVH with hyperdynamic left ventricular systolic function (EF >70%).     2 - Severe left atrial enlargement.     3 - Normal right ventricular systolic function .     4 - Significant mitral annular calcification with mean gradient of 8mmHg and +3 MR.             This document has been electronically    SIGNED BY: Elyssa Carbajal MD On: 11/13/2017 10:25       CURRENT/PREVIOUS VISIT EKG  Results for orders placed or performed during the hospital encounter of 09/05/20   EKG 12-lead    Collection Time: 09/05/20  1:20 PM    Narrative    Test Reason : R07.9,    Vent. Rate : 114 BPM     Atrial Rate : 105 BPM     P-R Int : 000 ms          QRS Dur : 088 ms      QT Int : 370 ms       P-R-T Axes : 000 -16 091 degrees     QTc Int : 509 ms    Atrial fibrillation with rapid ventricular response  Abnormal QRS-T angle, consider primary T wave abnormality  Abnormal ECG  When compared with ECG of 05-SEP-2020 11:17,  Atrial fibrillation has replaced Atrial flutter  ST no longer depressed in Anterior leads    Referred By: [email or web address]   SELF           Confirmed By:            ASSESSMENT/PLAN:     Active Hospital Problems    Diagnosis    *Acute on chronic respiratory failure    Acute on chronic heart failure    Atrial thrombus    Long term current use of anticoagulant    Acute respiratory failure    Atrial fibrillation with RVR    DNR (do not resuscitate)    ESRD (end stage renal disease) on HD M,W, F    Tobacco dependence    HTN (hypertension)       ASSESSMENT & PLAN:       1. AFIB with RVR  2. Acute CHF Exacerbation  3. HTN  4. ESRD ON HD  5. HTN  6. Left Atrial  Thrombus  7. Chronic Coumadin currently INR is subtherapeutic  8. Acute on Chronic Respiratory failure secondary to #1 and 2      RECOMMENDATIONS:  Recommend to initiate a intravenous Lopressor for rate control and optimize the blood pressure as well.  Consider ultrafiltration/hemodialysis further to mobilize some fluid.  Consider arm optimizing her anticoagulation therapy her INR is subtherapeutic in the presence of left atrial thrombus.  She may benefit from heparin therapy while she is subtherapeutic  May continue on diltiazem but there is room to increase the dose to 240 mg daily  Consider using short-acting metoprolol with further titration as did needed.  THANK YOU FOR THE CONSULTATION      Mary Do NP  [facility]  Department of Cardiology  Date of Service: 09/05/2020        I have personally interviewed and examined the patient, I have reviewed the Nurse Practitioner's history and physical, assessment, and plan. I agree with the findings and plan.      Jerrod Asif M.D.  [facility]  Department of Cardiology  Date of Service: 09/05/2020  2:46 PM

## 2020-09-05 NOTE — FIRST PROVIDER EVALUATION
Medical screening exam completed.  I have conducted a focused provider triage encounter, findings are as follows  Brief history of present illness:  Shortness of breath Onset this AM  Pt is dialysis pt Had dialysis yesterday     There were no vitals filed for this visit.    Pertinent physical exam:  Pt is tachypnic     Brief workup plan:  Pt brought to ED room for further evaluation     Preliminary workup initiated; this workup will be continued and followed by the physician or advanced practice provider that is assigned to the patient when roomed.

## 2020-09-05 NOTE — H&P
Replaced by [facility] History & Physical Examination   Patient Name: Griselda Neely  MRN: [id number]  Patient Class: IP- Inpatient   Admission Date: 9/5/2020 10:56 AM  Length of Stay: 0  Attending Physician: Twan Yu MD  Primary Care Provider: Kalie Neely MD  Face-to-Face encounter date: 09/05/2020  Code Status: DNR  Surrogate decision maker:  Sister Aide Watson [phone]  Chief Complaint: Shortness of Breath (THIS AM)        Patient information was obtained from patient, past medical records and ER records.   Case personally discussed with Dr. Sanford in the ER, Dr. KIKE Asif and Dr. Ingram  HISTORY OF PRESENT ILLNESS:   Griselda Neely is a 75 y.o. AAF female who  has a past medical history of Anemia, Atrial fibrillation, Calciphylaxis (07/2017), CHF (congestive heart failure), Encounter for blood transfusion (03/2016), Gout, Hemodialysis access, AV graft, Hypertension, Mitral valve regurgitation, Osteoarthritis, Pancreatitis, Peripheral vascular disease, Pneumonia (09/09/2017), and Renal failure.. The patient presented to [facility] on 9/5/2020 with a primary complaint of Shortness of Breath (THIS AM)    Patient presented to ER with complaint of sudden onset of shortness of breath this morning.  EMS was called, her initial O2 sat was in 80s.  She was brought to the ER.  Upon arrival, blood pressure was 132/80, respiration 28, heart rate 144, temperature 97.8°.  Patient was started on BiPAP and O2 sat improved to 100%.  Patient was given Cardizem time to IV with no improvement and Cardizem drip was initiated.  Patient heart rate remained in 140s and 130s on Cardizem drip.  Sister informed me that patient had her hemodialysis yesterday as scheduled.  X-ray chest indicates fluid overload.  Patient does not create urine.     After my evaluation, patient called the nurse and informed her that she feels she is having a heart attack.  Her blood pressure was high in  170s over 100's.  I re-evaluated the patient again in the ER.  Dr. KIKE Asif was consulted.  Troponin and stat EKG ordered.  Dr. KIKE Asif evaluated the patient in the ER.  Ordered metoprolol 2.5 mg IV x1 which improved the heart rate as well as her blood pressure improved to 140s over 80s  I also called Dr. Ingram, discussed the case, he will place the dialysis order for ultrafiltration.  Patient will be admitted to ICU per Dr. KIKE Asif as advised    Noted CBC shows H&H 10.6/34.9 with platelet 263  PT INR 15.7/1.3 (subtherapeutic)  CMP shows chloride 94, bicarb 33, BUN/creatinine 35/5.1 with GFR 8.9, alk-phos 140, albumin 3.1, T bili 1.2, AST 43, ALT 24  BNP greater than 4500, troponin 0.036  COVID rapid negative  X-ray personally reviewed shows cardiomegaly and pulmonary edema  EKG personally reviewed shows AFib with RVR and T-wave abnormality    PAST MEDICAL HISTORY:     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    both legs    CHF (congestive heart failure)     Encounter for blood transfusion 03/2016    Gout     Hemodialysis access, AV graft     Hypertension     Mitral valve regurgitation     Osteoarthritis     Pancreatitis     Peripheral vascular disease     Pneumonia 09/09/2017    Renal failure        PAST SURGICAL HISTORY:     Past Surgical History:   Procedure Laterality Date    ACHILLES TENDON SURGERY Right     ANGIOGRAPHY OF ARTERIOVENOUS SHUNT Right 1/7/2020    Procedure: Fistulogram with Possible Intervention;  Surgeon: Joseph Loyola MD;  Location: [facility] CATH/EP LAB;  Service: Cardiology;  Laterality: Right;    ANGIOGRAPHY OF LOWER EXTREMITY Left 3/18/2020    Procedure: Angiogram Extremity Unilateral;  Surgeon: Ali Khoobehi, MD;  Location: [facility] CATH/EP LAB;  Service: Cardiology;  Laterality: Left;    APPENDECTOMY      CARDIAC CATHETERIZATION  07/03/2017    CHOLECYSTECTOMY      ESOPHAGOGASTRODUODENOSCOPY Left 8/17/2020     Procedure: EGD (ESOPHAGOGASTRODUODENOSCOPY);  Surgeon: Talat Trevizo MD;  Location: [facility] ENDO;  Service: Endoscopy;  Laterality: Left;    heal surgery Right     HYSTERECTOMY      INSERTION OF STENT INTO PERIPHERAL VESSEL N/A 1/7/2020    Procedure: INSERTION, STENT, VESSEL, PERIPHERAL;  Surgeon: Joseph Loyola MD;  Location: [facility] CATH/EP LAB;  Service: Cardiology;  Laterality: N/A;    MITRAL VALVE REPLACEMENT      PARATHYROIDECTOMY      PARATHYROIDECTOMY  07/13/2017    PERCUTANEOUS TRANSLUMINAL ANGIOPLASTY OF ARTERIOVENOUS FISTULA N/A 1/7/2020    Procedure: PTA, AV FISTULA;  Surgeon: Joseph Loyola MD;  Location: [facility] CATH/EP LAB;  Service: Cardiology;  Laterality: N/A;    PERITONEAL CATHETER INSERTION      RENAL BIOPSY      vocal cord nodule      WOUND DEBRIDEMENT Left 7/13/2020    Procedure: DEBRIDEMENT, WOUND;  Surgeon: Carlos Elam III, MD;  Location: [facility] OR;  Service: General;  Laterality: Left;       ALLERGIES:   Patient has no known allergies.    FAMILY HISTORY:     Family History   Problem Relation Age of Onset    Heart disease Sister     Early death Sister         heart as baby    Heart disease Maternal Grandfather     Diabetes Mother     Hypertension Mother     Heart failure Mother     Heart disease Mother     Arthritis Mother     Diabetes Father     Early death Sister         infant       SOCIAL HISTORY:     Social History     Tobacco Use    Smoking status: Current Some Day Smoker     Packs/day: 0.50     Years: 45.00     Pack years: 22.50     Types: Cigarettes    Smokeless tobacco: Never Used   Substance Use Topics    Alcohol use: No        Social History     Substance and Sexual Activity   Sexual Activity Not on file        HOME MEDICATIONS:     Prior to Admission medications    Medication Sig Start Date End Date Taking? Authorizing Provider   aspirin (ECOTRIN) 81 MG EC tablet Take 81 mg by mouth once daily.   Yes Historical Provider, MD   calcium acetate (PHOSLO) 199  mg capsule Take 667 mg by mouth once daily.    Yes Historical Provider, MD   diltiaZEM (CARDIZEM CD) 180 MG 24 hr capsule Take 180 mg by mouth once daily.   Yes Historical Provider, MD   folic acid/vit B complex and C (JENNIFER-KODAK ORAL) Take 1 tablet by mouth once daily.   Yes Historical Provider, MD   isosorbide mononitrate (IMDUR) 30 MG 24 hr tablet Take 30 mg by mouth once daily.   Yes Historical Provider, MD   losartan (COZAAR) 25 MG tablet Take 25 mg by mouth once daily.   Yes Historical Provider, MD   magnesium oxide (MAG-OX) 400 mg (241.3 mg magnesium) tablet Take 1 tablet by mouth once daily 7/23/20  Yes Juan Esparza MD   metoprolol succinate (TOPROL-XL) 25 MG 24 hr tablet Take 1 tablet (25 mg total) by mouth nightly. 8/19/20 9/18/20 Yes Cristian Lopez MD   ondansetron (ZOFRAN-ODT) 4 MG TbDL Take 4 mg by mouth every 6 (six) hours as needed.   Yes Historical Provider, MD   oxyCODONE-acetaminophen (PERCOCET) 5-325 mg per tablet Take 1 tablet by mouth every 6 (six) hours as needed for Pain.   Yes Historical Provider, MD   pantoprazole (PROTONIX) 40 MG tablet Take 1 tablet (40 mg total) by mouth 2 (two) times daily. 8/19/20 9/18/20 Yes Cristian Lopez MD   traMADoL (ULTRAM) 50 mg tablet Take 50 mg by mouth every 8 (eight) hours as needed for Pain.   Yes Historical Provider, MD   warfarin (COUMADIN) 5 MG tablet Take 5 mg by mouth once daily.   Yes Historical Provider, MD   zinc sulfate 220 mg Tab tablet Take 220 mg by mouth every other day.   Yes Historical Provider, MD   NARCAN 4 mg/actuation [place] CALL 911. ADMINISTER A SINGLE SPRAY INTRANASALLY INTO ONE NOSTRIL UPON SIGNS OF OPIOID OVERDOSE. MAY REPEAT AFTER 3 MINUTES IF NO RESPONSE. 8/4/20   Historical Provider, MD   warfarin (COUMADIN) 2.5 MG tablet Take 1 tablet (2.5 mg total) by mouth Daily. 8/19/20 9/18/20  Cristian Lopez MD       REVIEW OF SYSTEMS:   10 Point Review of System was performed and was found to be negative except for that  "mentioned already in the HPI above.     PHYSICAL EXAM:   BP (!) 150/86   Pulse (!) 125   Temp 97.8 °F (36.6 °C) (Oral)   Resp (!) 24   Ht 5' 3" (1.6 m)   Wt 54.4 kg (120 lb)   LMP  (LMP Unknown)   SpO2 100%   BMI 21.26 kg/m²     Vitals Reviewed  General appearance: underweight, elderly female , uncomfortable and on Bipap.  HENT: facial edema from recent fall  Eyes: Normal extraocular movements.   Neck: Supple.   Lungs: Course breath sounds, +creptation and end expiratory wheeze  Heart: very feeble heart sounds due to Bipap triggered breaths   Abdomen: Soft, non-distended, non-tender. No rebound tenderness/guarding. Bowel sounds are normal.   Extremities: No cyanosis, clubbing, or edema.  Skin: wound left toe.   Neuro: Motor and sensory exams grossly intact. Good tone. Muscle strength 5/5 in all 4 extremities  Psych/mental status: Appropriate mood and affect. Responds appropriately to questions. Awake and alert    EMERGENCY DEPARTMENT LABS AND IMAGING:     Labs Reviewed   CBC W/ AUTO DIFFERENTIAL - Abnormal; Notable for the following components:       Result Value    RBC 3.71 (*)     Hemoglobin 10.6 (*)     Hematocrit 34.9 (*)     Mean Corpuscular Hemoglobin Conc 30.4 (*)     RDW 16.0 (*)     Lymph # 0.8 (*)     Gran% 81.1 (*)     Lymph% 11.0 (*)     All other components within normal limits   COMPREHENSIVE METABOLIC PANEL - Abnormal; Notable for the following components:    Chloride 94 (*)     CO2 33 (*)     BUN, Bld 35 (*)     Creatinine 5.1 (*)     Calcium 8.6 (*)     Albumin 3.1 (*)     Total Bilirubin 1.2 (*)     Alkaline Phosphatase 140 (*)     AST 43 (*)     eGFR if  8.9 (*)     eGFR if non  7.8 (*)     All other components within normal limits    Narrative:     Recoll. [id number] by CYNDIE at 09/05/2020 12:02, reason: Specimen   hemolyzed  Notified Micheal Viera   B-TYPE NATRIURETIC PEPTIDE - Abnormal; Notable for the following components:    BNP >4,500 (*)     All " other components within normal limits   PROTIME-INR - Abnormal; Notable for the following components:    PT 15.7 (*)     All other components within normal limits   TROPONIN I   SARS-COV-2 RNA AMPLIFICATION, QUAL   TROPONIN I       X-Ray Chest AP Portable   Final Result          ASSESSMENT & PLAN:   Griselda Neely is a 75 y.o. female admitted for    Active Hospital Problems    Diagnosis    *Acute on chronic respiratory failure    Atrial fibrillation with RVR    Acute on chronic heart failure    HTN (hypertension)    ESRD (end stage renal disease) on HD M,W, F    Tobacco dependence    DNR (do not resuscitate)    Atrial thrombus    Long term current use of anticoagulant    Acute respiratory failure     Plan  Admit as inpatient to ICU  Cardiology Dr. KIKE Asif consulted, evaluated the patient in the ER, much appreciated  Nephrology Dr. Ingram consulted, discussed his case personally with him.  Will place dialysis ordered  BiPAP now and then p.r.n. if tolerated  Received Cardizem IV x2 in the ER, now on Cardizem drip at 10  Received metoprolol 2.5 mg IV x1 for elevated blood pressure with marked improvement and blood pressure and heart rate  INRs subtherapeutic, pharmacy consulted for dose adjustment- goal 2-3  Ordered Mag gas at on for now  Continue home medications for chronic medical condition, hold diltiazem oral for now  Continue home pain medication oxycodone 5/325 mg and tramadol  MiraLax p.r.n. constipation  Zofran/Phenergan for nausea/vomiting  Keep patient NPO for now till off of BiPAP    DVT Prophylaxis: already on coumadin    Critical care was time spent personally by me on the following activities: development of treatment plan with patient or surrogate and bedside caregivers, discussions with consultants, evaluation of patient's response to treatment, examination of patient, ordering and performing treatments and interventions, ordering and review of laboratory studies, ordering and review of  radiographic studies, pulse oximetry, re-evaluation of patient's condition 2 more times after initial eval. This critical care time did not overlap with that of any other provider or involve time for any procedures. Time spent 63 mins    ________________________________________________________________________________    Discharge Planning and Disposition: No mobility needs. Ambulating well per sister. Good social support system. Patient will be discharged in 2-3 days    Face-to-Face encounter date: 09/05/2020  Encounter included review of the medical records, interviewing and examining the patient face-to-face, discussion with family and other health care providers including emergency medicine physician, admission orders, interpreting lab/test results and formulating a plan of care.     Medical Decision Making during this encounter was  [_] Low Complexity  [_] Moderate Complexity  [X] High Complexity  _________________________________________________________________________________    INPATIENT LIST OF MEDICATIONS     Current Facility-Administered Medications:     acetaminophen tablet 650 mg, 650 mg, Oral, Q4H PRN, Twan Yu MD    acetaminophen tablet 650 mg, 650 mg, Oral, Q8H PRN, Twan Yu MD    aspirin EC tablet 81 mg, 81 mg, Oral, Daily, Twan Yu MD    calcium acetate(phosphat bind) capsule 667 mg, 667 mg, Oral, Daily, Twan Yu MD    dextrose 50% injection 12.5 g, 12.5 g, Intravenous, PRN, Twan Yu MD    dextrose 50% injection 25 g, 25 g, Intravenous, PRN, Twan Yu MD    diltiaZEM 125 mg in D5W 125 mL infusion, 5 mg/hr, Intravenous, Continuous, Kenji Sanford Jr., MD, Last Rate: 10 mL/hr at 09/05/20 1245, 10 mg/hr at 09/05/20 1245    glucose chewable tablet 16 g, 16 g, Oral, PRN, Twan Yu MD    glucose chewable tablet 24 g, 24 g, Oral, PRN, Twan Yu MD    isosorbide mononitrate 24 hr tablet 30 mg, 30 mg, Oral, Daily, Twan Yu MD    losartan tablet 25 mg, 25 mg, Oral,  Daily, Twan Yu MD    magnesium oxide tablet 400 mg, 400 mg, Oral, Daily, Twan Yu MD    metoprolol succinate (TOPROL-XL) 24 hr tablet 25 mg, 25 mg, Oral, Nightly, Twan Yu MD    ondansetron injection 4 mg, 4 mg, Intravenous, Q8H PRN, Twan Yu MD    oxyCODONE-acetaminophen 5-325 mg per tablet 1 tablet, 1 tablet, Oral, Q6H PRN, Twan Yu MD    pantoprazole EC tablet 40 mg, 40 mg, Oral, BID, Twan Yu MD    polyethylene glycol packet 17 g, 17 g, Oral, Daily PRN, Twan Yu MD    sodium chloride 0.9% flush 10 mL, 10 mL, Intravenous, PRN, Twan Yu MD    traMADoL tablet 50 mg, 50 mg, Oral, Q8H PRN, Twan Yu MD    warfarin (COUMADIN) tablet 2.5 mg, 2.5 mg, Oral, Daily, Twan Yu MD    Current Outpatient Medications:     aspirin (ECOTRIN) 81 MG EC tablet, Take 81 mg by mouth once daily., Disp: , Rfl:     calcium acetate (PHOSLO) 667 mg capsule, Take 667 mg by mouth once daily. , Disp: , Rfl:     diltiaZEM (CARDIZEM CD) 180 MG 24 hr capsule, Take 180 mg by mouth once daily., Disp: , Rfl:     folic acid/vit B complex and C (JENNIFER-KODAK ORAL), Take 1 tablet by mouth once daily., Disp: , Rfl:     isosorbide mononitrate (IMDUR) 30 MG 24 hr tablet, Take 30 mg by mouth once daily., Disp: , Rfl:     losartan (COZAAR) 25 MG tablet, Take 25 mg by mouth once daily., Disp: , Rfl:     magnesium oxide (MAG-OX) 400 mg (241.3 mg magnesium) tablet, Take 1 tablet by mouth once daily, Disp: 30 tablet, Rfl: 0    metoprolol succinate (TOPROL-XL) 25 MG 24 hr tablet, Take 1 tablet (25 mg total) by mouth nightly., Disp: 30 tablet, Rfl: 0    ondansetron (ZOFRAN-ODT) 4 MG TbDL, Take 4 mg by mouth every 6 (six) hours as needed., Disp: , Rfl:     oxyCODONE-acetaminophen (PERCOCET) 5-325 mg per tablet, Take 1 tablet by mouth every 6 (six) hours as needed for Pain., Disp: , Rfl:     pantoprazole (PROTONIX) 40 MG tablet, Take 1 tablet (40 mg total) by mouth 2 (two) times daily., Disp: 60 tablet, Rfl: 0     traMADoL (ULTRAM) 50 mg tablet, Take 50 mg by mouth every 8 (eight) hours as needed for Pain., Disp: , Rfl:     warfarin (COUMADIN) 5 MG tablet, Take 5 mg by mouth once daily., Disp: , Rfl:     zinc sulfate 220 mg Tab tablet, Take 220 mg by mouth every other day., Disp: , Rfl:     NARCAN 4 mg/actuation [place], CALL 911. ADMINISTER A SINGLE SPRAY INTRANASALLY INTO ONE NOSTRIL UPON SIGNS OF OPIOID OVERDOSE. MAY REPEAT AFTER 3 MINUTES IF NO RESPONSE., Disp: , Rfl:     warfarin (COUMADIN) 2.5 MG tablet, Take 1 tablet (2.5 mg total) by mouth Daily., Disp: 30 tablet, Rfl: 0      Scheduled Meds:   aspirin  81 mg Oral Daily    calcium acetate(phosphat bind)  667 mg Oral Daily    isosorbide mononitrate  30 mg Oral Daily    losartan  25 mg Oral Daily    magnesium oxide  400 mg Oral Daily    metoprolol succinate  25 mg Oral Nightly    pantoprazole  40 mg Oral BID    warfarin  2.5 mg Oral Daily     Continuous Infusions:   dilTIAZem 10 mg/hr (09/05/20 1245)     PRN Meds:.acetaminophen, acetaminophen, dextrose 50%, dextrose 50%, glucose, glucose, ondansetron, oxyCODONE-acetaminophen, polyethylene glycol, sodium chloride 0.9%, traMADoL      Twan Yu MD  [facility] Hospitalist  09/05/2020

## 2020-09-05 NOTE — NURSING
Received from er, vitals stable, on cardizem drip, for dialysis this evening,pt c/o of left toe pain, states she needs the nail removed, on bipap sats 100%, moveing senthil arms and senthil legs, noted, wound to left thigh, pictures taken and redressed. Pupils equal and reactive

## 2020-09-06 VITALS
TEMPERATURE: 98 F | RESPIRATION RATE: 20 BRPM | BODY MASS INDEX: 23.4 KG/M2 | HEART RATE: 86 BPM | OXYGEN SATURATION: 99 % | DIASTOLIC BLOOD PRESSURE: 82 MMHG | SYSTOLIC BLOOD PRESSURE: 153 MMHG | HEIGHT: 63 IN | WEIGHT: 132.06 LBS

## 2020-09-06 PROBLEM — I50.43 ACUTE ON CHRONIC COMBINED SYSTOLIC AND DIASTOLIC HEART FAILURE: Status: RESOLVED | Noted: 2020-09-05 | Resolved: 2020-09-06

## 2020-09-06 PROBLEM — I48.91 ATRIAL FIBRILLATION WITH RVR: Status: RESOLVED | Noted: 2020-05-09 | Resolved: 2020-09-06

## 2020-09-06 PROBLEM — J96.21 ACUTE ON CHRONIC RESPIRATORY FAILURE WITH HYPOXIA: Status: ACTIVE | Noted: 2020-06-05

## 2020-09-06 PROBLEM — I50.43 ACUTE ON CHRONIC COMBINED SYSTOLIC AND DIASTOLIC HEART FAILURE: Status: ACTIVE | Noted: 2020-09-05

## 2020-09-06 PROBLEM — J96.21 ACUTE ON CHRONIC RESPIRATORY FAILURE WITH HYPOXIA: Status: RESOLVED | Noted: 2020-06-05 | Resolved: 2020-09-06

## 2020-09-06 LAB
ALBUMIN SERPL BCP-MCNC: 2.8 G/DL (ref 3.5–5.2)
ALP SERPL-CCNC: 129 U/L (ref 55–135)
ALT SERPL W/O P-5'-P-CCNC: 18 U/L (ref 10–44)
ANION GAP SERPL CALC-SCNC: 14 MMOL/L (ref 8–16)
AST SERPL-CCNC: 27 U/L (ref 10–40)
BASOPHILS # BLD AUTO: 0.03 K/UL (ref 0–0.2)
BASOPHILS NFR BLD: 0.6 % (ref 0–1.9)
BILIRUB SERPL-MCNC: 1.4 MG/DL (ref 0.1–1)
BUN SERPL-MCNC: 42 MG/DL (ref 8–23)
CALCIUM SERPL-MCNC: 8.4 MG/DL (ref 8.7–10.5)
CHLORIDE SERPL-SCNC: 95 MMOL/L (ref 95–110)
CO2 SERPL-SCNC: 30 MMOL/L (ref 23–29)
CREAT SERPL-MCNC: 5.8 MG/DL (ref 0.5–1.4)
DIFFERENTIAL METHOD: ABNORMAL
EOSINOPHIL # BLD AUTO: 0.1 K/UL (ref 0–0.5)
EOSINOPHIL NFR BLD: 2.4 % (ref 0–8)
ERYTHROCYTE [DISTWIDTH] IN BLOOD BY AUTOMATED COUNT: 16.2 % (ref 11.5–14.5)
EST. GFR  (AFRICAN AMERICAN): 7.7 ML/MIN/1.73 M^2
EST. GFR  (NON AFRICAN AMERICAN): 6.6 ML/MIN/1.73 M^2
GLUCOSE SERPL-MCNC: 52 MG/DL (ref 70–110)
GLUCOSE SERPL-MCNC: 68 MG/DL (ref 70–110)
HCT VFR BLD AUTO: 31.6 % (ref 37–48.5)
HGB BLD-MCNC: 9.7 G/DL (ref 12–16)
IMM GRANULOCYTES # BLD AUTO: 0.01 K/UL (ref 0–0.04)
IMM GRANULOCYTES NFR BLD AUTO: 0.2 % (ref 0–0.5)
INR PPP: 1.4
LYMPHOCYTES # BLD AUTO: 0.9 K/UL (ref 1–4.8)
LYMPHOCYTES NFR BLD: 18.9 % (ref 18–48)
MAGNESIUM SERPL-MCNC: 1.8 MG/DL (ref 1.6–2.6)
MCH RBC QN AUTO: 28.7 PG (ref 27–31)
MCHC RBC AUTO-ENTMCNC: 30.7 G/DL (ref 32–36)
MCV RBC AUTO: 94 FL (ref 82–98)
MONOCYTES # BLD AUTO: 0.5 K/UL (ref 0.3–1)
MONOCYTES NFR BLD: 9.7 % (ref 4–15)
NEUTROPHILS # BLD AUTO: 3.4 K/UL (ref 1.8–7.7)
NEUTROPHILS NFR BLD: 68.2 % (ref 38–73)
NRBC BLD-RTO: 0 /100 WBC
PHOSPHATE SERPL-MCNC: 3.9 MG/DL (ref 2.7–4.5)
PLATELET # BLD AUTO: 221 K/UL (ref 150–350)
PMV BLD AUTO: 11.3 FL (ref 9.2–12.9)
POTASSIUM SERPL-SCNC: 4.4 MMOL/L (ref 3.5–5.1)
PROT SERPL-MCNC: 6.8 G/DL (ref 6–8.4)
PROTHROMBIN TIME: 16.1 SEC (ref 10.6–14.8)
RBC # BLD AUTO: 3.38 M/UL (ref 4–5.4)
SODIUM SERPL-SCNC: 139 MMOL/L (ref 136–145)
WBC # BLD AUTO: 4.93 K/UL (ref 3.9–12.7)

## 2020-09-06 PROCEDURE — 25000003 PHARM REV CODE 250: Performed by: INTERNAL MEDICINE

## 2020-09-06 PROCEDURE — 84100 ASSAY OF PHOSPHORUS: CPT

## 2020-09-06 PROCEDURE — 36415 COLL VENOUS BLD VENIPUNCTURE: CPT

## 2020-09-06 PROCEDURE — 27000221 HC OXYGEN, UP TO 24 HOURS

## 2020-09-06 PROCEDURE — 85610 PROTHROMBIN TIME: CPT

## 2020-09-06 PROCEDURE — 25000003 PHARM REV CODE 250: Performed by: NURSE PRACTITIONER

## 2020-09-06 PROCEDURE — 80053 COMPREHEN METABOLIC PANEL: CPT

## 2020-09-06 PROCEDURE — 94660 CPAP INITIATION&MGMT: CPT

## 2020-09-06 PROCEDURE — 85025 COMPLETE CBC W/AUTO DIFF WBC: CPT

## 2020-09-06 PROCEDURE — 83735 ASSAY OF MAGNESIUM: CPT

## 2020-09-06 PROCEDURE — 94761 N-INVAS EAR/PLS OXIMETRY MLT: CPT

## 2020-09-06 PROCEDURE — 99900035 HC TECH TIME PER 15 MIN (STAT)

## 2020-09-06 RX ORDER — CHLORHEXIDINE GLUCONATE ORAL RINSE 1.2 MG/ML
15 SOLUTION DENTAL 2 TIMES DAILY
Status: DISCONTINUED | OUTPATIENT
Start: 2020-09-06 | End: 2020-09-06 | Stop reason: HOSPADM

## 2020-09-06 RX ORDER — DILTIAZEM HYDROCHLORIDE 180 MG/1
180 CAPSULE, COATED, EXTENDED RELEASE ORAL DAILY
Status: DISCONTINUED | OUTPATIENT
Start: 2020-09-06 | End: 2020-09-06 | Stop reason: HOSPADM

## 2020-09-06 RX ORDER — WARFARIN 2.5 MG/1
5 TABLET ORAL ONCE
Status: COMPLETED | OUTPATIENT
Start: 2020-09-06 | End: 2020-09-06

## 2020-09-06 RX ADMIN — MAGNESIUM OXIDE 400 MG: 400 TABLET ORAL at 08:09

## 2020-09-06 RX ADMIN — DILTIAZEM HYDROCHLORIDE 180 MG: 180 CAPSULE, COATED, EXTENDED RELEASE ORAL at 12:09

## 2020-09-06 RX ADMIN — MUPIROCIN: 20 OINTMENT TOPICAL at 08:09

## 2020-09-06 RX ADMIN — DILTIAZEM HYDROCHLORIDE 5 MG/HR: 5 INJECTION INTRAVENOUS at 12:09

## 2020-09-06 RX ADMIN — WARFARIN SODIUM 5 MG: 2.5 TABLET ORAL at 12:09

## 2020-09-06 RX ADMIN — ASPIRIN 81 MG: 81 TABLET, DELAYED RELEASE ORAL at 08:09

## 2020-09-06 RX ADMIN — ISOSORBIDE MONONITRATE 30 MG: 30 TABLET, EXTENDED RELEASE ORAL at 08:09

## 2020-09-06 RX ADMIN — LOSARTAN POTASSIUM 25 MG: 25 TABLET, FILM COATED ORAL at 08:09

## 2020-09-06 RX ADMIN — PANTOPRAZOLE SODIUM 40 MG: 40 TABLET, DELAYED RELEASE ORAL at 08:09

## 2020-09-06 RX ADMIN — CALCIUM ACETATE 667 MG: 667 CAPSULE ORAL at 08:09

## 2020-09-06 NOTE — PLAN OF CARE
09/06/20 0800   Patient Assessment/Suction   Level of Consciousness (AVPU) alert   Respiratory Effort Unlabored   All Lung Fields Breath Sounds coarse   Rhythm/Pattern, Respiratory unlabored   PRE-TX-O2   O2 Device (Oxygen Therapy) nasal cannula   $ Is the patient on Low Flow Oxygen? Yes   Flow (L/min) 3   Oxygen Concentration (%) 32   SpO2 99 %   Pulse Oximetry Type Continuous   $ Pulse Oximetry - Multiple Charge Pulse Oximetry - Multiple   Pulse 70   Ready to Wean/Extubation Screen   FIO2<=50 (chart decimal) 0.32   Preset CPAP/BiPAP Settings   Mode Of Delivery Standby;BiPAP S/T   $ CPAP/BiPAP Daily Charge BiPAP/CPAP Daily   $ Is patient using? Yes   Equipment Type V60   Ipap 16   EPAP (cm H2O) 8   Pressure Support (cm H2O) 8   continue bipap as needed

## 2020-09-06 NOTE — PLAN OF CARE
Problem: Adult Inpatient Plan of Care  Goal: Plan of Care Review  Outcome: Adequate for Care Transition  Goal: Patient-Specific Goal (Individualization)  Outcome: Adequate for Care Transition  Goal: Absence of Hospital-Acquired Illness or Injury  Outcome: Adequate for Care Transition  Goal: Optimal Comfort and Wellbeing  Outcome: Adequate for Care Transition  Goal: Readiness for Transition of Care  Outcome: Adequate for Care Transition  Goal: Rounds/Family Conference  Outcome: Adequate for Care Transition     Problem: Wound  Goal: Optimal Wound Healing  Outcome: Adequate for Care Transition     Problem: Fall Injury Risk  Goal: Absence of Fall and Fall-Related Injury  Outcome: Adequate for Care Transition     Problem: Skin Injury Risk Increased  Goal: Skin Health and Integrity  Outcome: Adequate for Care Transition     Problem: Device-Related Complication Risk (Hemodialysis)  Goal: Safe, Effective Therapy Delivery  Outcome: Adequate for Care Transition     Problem: Hemodynamic Instability (Hemodialysis)  Goal: Vital Signs Remain in Desired Range  Outcome: Adequate for Care Transition     Problem: Infection (Hemodialysis)  Goal: Absence of Infection Signs/Symptoms  Outcome: Adequate for Care Transition

## 2020-09-06 NOTE — HOSPITAL COURSE
75-year-old female with multiple medical comorbidities including but not limited to chronic combined congestive heart failure, ESRD on hemodialysis, paroxysmal atrial fibrillation on warfarin therapy presents from dialysis unit secondary to shortness of breath.  She was found to be in acute on chronic hypoxic respiratory failure and in AF with RVR.  Symptoms likely secondary to fluid overload in the setting of atrial fibrillation with RVR.  She underwent dialysis last night with improvement in her symptoms.  She is back to her baseline O2 requirements.  AFib with RVR resolved; currently rates are well controlled but remains in atrial fibrillation.  She was found to be subtherapeutic on admission; advised to increase home warfarin to 5 mg daily for the next 2 days and have INR checked by home health nurse on Wednesday and follow-up with cardiology.    Instructions provided to follow up with primary care physician and cardiology as outpatient. Patient verbalized understanding and is aware to contact primary care physician or return to ED if new or worsening symptoms.    Physical exam on the day of discharge:  General: Patient resting comfortably in no acute distress.  Lungs: CTA. Good air entry.  Bibasilar crackles audible  Cor:  Irregularly irregular. No murmurs. No pedal edema.  Abd: Soft. Nontender. Non-distended.  Neuro: A&O x3. Moving all 4 extremities equally  Ext: No clubbing. No cyanosis.  Left thigh dressing is clean, dry and intact

## 2020-09-06 NOTE — CONSULTS
HISTORY OF PRESENT ILLNESS:   Griselda Neely is a 75 y.o. AAF female who  has a past medical history of ESRD (MWF with our group),  Anemia, Atrial fibrillation, Calciphylaxis (07/2017), CHF (congestive heart failure),  Gout, Hemodialysis access, AV graft, Hypertension, Mitral valve regurgitation, Osteoarthritis, Pancreatitis, Peripheral vascular disease, Pneumonia (09/09/2017).   Patient presented to ER with complaint of sudden onset of shortness of breath this morning.  EMS was called, her initial O2 sat was in 80s.  She was brought to the ER.  Upon arrival, blood pressure was 132/80, respiration 28, heart rate 144, temperature 97.8°.  Patient was started on BiPAP and O2 sat improved to 100%.  Patient was given Cardizem time to IV with no improvement and Cardizem drip was initiated.  Patient heart rate remained in 140s and 130s on Cardizem drip. Patient had her hemodialysis yesterday as scheduled.  X-ray chest indicates fluid overload.         PAST MEDICAL HISTORY:           Past Medical History:   Diagnosis Date    Anemia      Atrial fibrillation      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     both legs    CHF (congestive heart failure)      Encounter for blood transfusion 03/2016    Gout      Hemodialysis access, AV graft      Hypertension      Mitral valve regurgitation      Osteoarthritis      Pancreatitis      Peripheral vascular disease      Pneumonia 09/09/2017    Renal failure           PAST SURGICAL HISTORY:            Past Surgical History:   Procedure Laterality Date    ACHILLES TENDON SURGERY Right      ANGIOGRAPHY OF ARTERIOVENOUS SHUNT Right 1/7/2020     Procedure: Fistulogram with Possible Intervention;  Surgeon: Joseph Loyola MD;  Location: [facility] CATH/EP LAB;  Service: Cardiology;  Laterality: Right;    ANGIOGRAPHY OF LOWER EXTREMITY Left 3/18/2020     Procedure: Angiogram Extremity Unilateral;  Surgeon: Ali Khoobehi, MD;  Location: [facility] CATH/EP LAB;  Service: Cardiology;  Laterality: Left;     APPENDECTOMY        CARDIAC CATHETERIZATION   07/03/2017    CHOLECYSTECTOMY        ESOPHAGOGASTRODUODENOSCOPY Left 8/17/2020     Procedure: EGD (ESOPHAGOGASTRODUODENOSCOPY);  Surgeon: Talat Trevizo MD;  Location: [facility] ENDO;  Service: Endoscopy;  Laterality: Left;    heal surgery Right      HYSTERECTOMY        INSERTION OF STENT INTO PERIPHERAL VESSEL N/A 1/7/2020     Procedure: INSERTION, STENT, VESSEL, PERIPHERAL;  Surgeon: Joseph Loyola MD;  Location: [facility] CATH/EP LAB;  Service: Cardiology;  Laterality: N/A;    MITRAL VALVE REPLACEMENT        PARATHYROIDECTOMY        PARATHYROIDECTOMY   07/13/2017    PERCUTANEOUS TRANSLUMINAL ANGIOPLASTY OF ARTERIOVENOUS FISTULA N/A 1/7/2020     Procedure: PTA, AV FISTULA;  Surgeon: Joseph Loyola MD;  Location: [facility] CATH/EP LAB;  Service: Cardiology;  Laterality: N/A;    PERITONEAL CATHETER INSERTION        RENAL BIOPSY        vocal cord nodule        WOUND DEBRIDEMENT Left 7/13/2020     Procedure: DEBRIDEMENT, WOUND;  Surgeon: Carlos Elam III, MD;  Location: [facility] OR;  Service: General;  Laterality: Left;         ALLERGIES:   Patient has no known allergies.     FAMILY HISTORY:            Family History   Problem Relation Age of Onset    Heart disease Sister      Early death Sister           heart as baby    Heart disease Maternal Grandfather      Diabetes Mother      Hypertension Mother      Heart failure Mother      Heart disease Mother      Arthritis Mother      Diabetes Father      Early death Sister           infant         SOCIAL HISTORY:      Social History            Tobacco Use    Smoking status: Current Some Day Smoker       Packs/day: 0.50       Years: 45.00       Pack years: 22.50       Types: Cigarettes    Smokeless tobacco: Never Used   Substance Use Topics    Alcohol use: No         Social History          Substance and Sexual Activity   Sexual Activity Not on file         HOME MEDICATIONS:              Prior to Admission  medications    Medication Sig Start Date End Date Taking? Authorizing Provider   aspirin (ECOTRIN) 81 MG EC tablet Take 81 mg by mouth once daily.     Yes Historical Provider, MD   calcium acetate (PHOSLO) 667 mg capsule Take 667 mg by mouth once daily.      Yes Historical Provider, MD   diltiaZEM (CARDIZEM CD) 180 MG 24 hr capsule Take 180 mg by mouth once daily.     Yes Historical Provider, MD   folic acid/vit B complex and C (JENNIFER-KODAK ORAL) Take 1 tablet by mouth once daily.     Yes Historical Provider, MD   isosorbide mononitrate (IMDUR) 30 MG 24 hr tablet Take 30 mg by mouth once daily.     Yes Historical Provider, MD   losartan (COZAAR) 25 MG tablet Take 25 mg by mouth once daily.     Yes Historical Provider, MD   magnesium oxide (MAG-OX) 400 mg (241.3 mg magnesium) tablet Take 1 tablet by mouth once daily 7/23/20   Yes Juan Esparza MD   metoprolol succinate (TOPROL-XL) 25 MG 24 hr tablet Take 1 tablet (25 mg total) by mouth nightly. 8/19/20 9/18/20 Yes Cristian Lopez MD   ondansetron (ZOFRAN-ODT) 4 MG TbDL Take 4 mg by mouth every 6 (six) hours as needed.     Yes Historical Provider, MD   oxyCODONE-acetaminophen (PERCOCET) 5-325 mg per tablet Take 1 tablet by mouth every 6 (six) hours as needed for Pain.     Yes Historical Provider, MD   pantoprazole (PROTONIX) 40 MG tablet Take 1 tablet (40 mg total) by mouth 2 (two) times daily. 8/19/20 9/18/20 Yes Cristian Lopez MD   traMADoL (ULTRAM) 50 mg tablet Take 50 mg by mouth every 8 (eight) hours as needed for Pain.     Yes Historical Provider, MD   warfarin (COUMADIN) 5 MG tablet Take 5 mg by mouth once daily.     Yes Historical Provider, MD   zinc sulfate 220 mg Tab tablet Take 220 mg by mouth every other day.     Yes Historical Provider, MD   NARCAN 4 mg/actuation [place] CALL 911. ADMINISTER A SINGLE SPRAY INTRANASALLY INTO ONE NOSTRIL UPON SIGNS OF OPIOID OVERDOSE. MAY REPEAT AFTER 3 MINUTES IF NO RESPONSE. 8/4/20     Historical Provider, MD    warfarin (COUMADIN) 2.5 MG tablet Take 1 tablet (2.5 mg total) by mouth Daily. 8/19/20 9/18/20   Cristian Lopez MD           ROS:  +SOB, ARGUELLO with abrupt onset early this AM  +dry cough  +othopnea  +chest pressure-nonradiating and centrally located, better with improvement in breathing  +fatigue, chronic    -f/c/sweats  -anginal pain, palpitations  -HEENT issues  -focal weakness  -bleeding  -n/v/d, abd pain      PE:  (seen via tele with HD nurse)  AA  On BiPAP  On HD  B JVD noted  nonlabored respirations. Normal pattern  RRR  nondistended abd  + LE edema  05/20 1715  --  --  22Abnormal   171/79Abnormal   --  --  --  --  --  --  --  --  --     09/05/20 1700  --  99  17  180/84Abnormal   --  121  100 %  --  --  --  --  --  --     09/05/20 1650  97.7 °F (36.5 °C)  --  --  193/84Abnormal   Lying  --  --  --  --  --  --  --  --     09/05/20 1630  --  92  20  182/87Abnormal   --  125  100 %  --  --  --  --  --  --     09/05/20 1615  --  98  26Abnormal   155/88Abnormal   --  115  100 %  --  3  --  nasal cannula  --  --     09/05/20 1602  --  --  25Abnormal   --  --  --  --  59.9 kg (132 lb 0.9 oz)  --  --  --  --  --     09/05/20 1600  --  100  24Abnormal   164/100Abnormal   --  122  100 %  --  --  --  --  --  --     09/05/20 1545  --  98  30Abnormal   160/89Abnormal   --  119  100 %  --  --  --  --  --  --     09/05/20 1530  97.9 °F (36.6 °C)  95  28Abnormal   133/93Abnormal   Lying  109                           LABS:     Ref. Range 9/5/2020 11:28   WBC Latest Ref Range: 3.90 - 12.70 K/uL 7.02   RBC Latest Ref Range: 4.00 - 5.40 M/uL 3.71 (L)   Hemoglobin Latest Ref Range: 12.0 - 16.0 g/dL 10.6 (L)   Hematocrit Latest Ref Range: 37.0 - 48.5 % 34.9 (L)   MCV Latest Ref Range: 82 - 98 fL 94   MCH Latest Ref Range: 27.0 - 31.0 pg 28.6   MCHC Latest Ref Range: 32.0 - 36.0 g/dL 30.4 (L)   RDW Latest Ref Range: 11.5 - 14.5 % 16.0 (H)   Platelets Latest Ref Range: 150 - 350 K/uL 263   MPV Latest Ref Range:  9.2 - 12.9 fL 10.4   Gran% Latest Ref Range: 38.0 - 73.0 % 81.1 (H)   Gran # (ANC) Latest Ref Range: 1.8 - 7.7 K/uL 5.7   Lymph% Latest Ref Range: 18.0 - 48.0 % 11.0 (L)   Lymph # Latest Ref Range: 1.0 - 4.8 K/uL 0.8 (L)   Mono% Latest Ref Range: 4.0 - 15.0 % 6.4   Mono # Latest Ref Range: 0.3 - 1.0 K/uL 0.5   Eosinophil% Latest Ref Range: 0.0 - 8.0 % 0.7   Eos # Latest Ref Range: 0.0 - 0.5 K/uL 0.1   Basophil% Latest Ref Range: 0.0 - 1.9 % 0.4   Baso # Latest Ref Range: 0.00 - 0.20 K/uL 0.03   nRBC Latest Ref Range: 0 /100 WBC 0   Differential Method Unknown Automated   Immature Grans (Abs) Latest Ref Range: 0.00 - 0.04 K/uL 0.03   Immature Granulocytes Latest Ref Range: 0.0 - 0.5 % 0.4   INR Unknown 1.3   PT Latest Ref Range: 10.6 - 14.8 sec 15.7 (H)   Sodium Latest Ref Range: 136 - 145 mmol/L 139   Potassium Latest Ref Range: 3.5 - 5.1 mmol/L 4.3   Chloride Latest Ref Range: 95 - 110 mmol/L 94 (L)   CO2 Latest Ref Range: 23 - 29 mmol/L 33 (H)   Anion Gap Latest Ref Range: 8 - 16 mmol/L 12   BUN, Bld Latest Ref Range: 8 - 23 mg/dL 35 (H)   Creatinine Latest Ref Range: 0.5 - 1.4 mg/dL 5.1 (H)   eGFR if non African American Latest Ref Range: >60 mL/min/1.73 m^2 7.8 (A)   eGFR if African American Latest Ref Range: >60 mL/min/1.73 m^2 8.9 (A)   Glucose Latest Ref Range: 70 - 110 mg/dL 83   Calcium Latest Ref Range: 8.7 - 10.5 mg/dL 8.6 (L)   Alkaline Phosphatase Latest Ref Range: 55 - 135 U/L 140 (H)   PROTEIN TOTAL Latest Ref Range: 6.0 - 8.4 g/dL 7.7   Albumin Latest Ref Range: 3.5 - 5.2 g/dL 3.1 (L)   BILIRUBIN TOTAL Latest Ref Range: 0.1 - 1.0 mg/dL 1.2 (H)   AST Latest Ref Range: 10 - 40 U/L 43 (H)   ALT Latest Ref Range: 10 - 44 U/L 24   BNP Latest Ref Range: 0 - 99 pg/mL >4,500 (H)   Troponin I Latest Ref Range: <=0.040 ng/mL 0.036         A/P:  A/w acute decompensated LV CHF and pulmonary edema.  At the time of my exam, on HD and tolerating from the outset well        Acute CHF and pulmonary edema  UF on  admit  F/u AM for possible additional treatments      CP  As usual for her, better with PPV and volume control  Cards following      HTN  Usually responds to UF  F/u with UF and home meds      Sec HPT  Ca OK  F/u PO4      Anemia  DONNY with TIW HD

## 2020-09-06 NOTE — DISCHARGE SUMMARY
[facility]  Discharge Summary      Patient Name: Griselda Neely  MRN: [id number]  Admission Date: 9/5/2020  Hospital Length of Stay: 1 days  Discharge Date and Time:  09/06/2020 2:34 PM  Attending Physician: Homer Carlson MD   Discharging Provider: Homer Carlson MD  Primary Care Provider: Kalie Neely MD      Hospital Course:   75-year-old female with multiple medical comorbidities including but not limited to chronic combined congestive heart failure, ESRD on hemodialysis, paroxysmal atrial fibrillation on warfarin therapy presents from dialysis unit secondary to shortness of breath.  She was found to be in acute on chronic hypoxic respiratory failure and in AF with RVR.  Symptoms likely secondary to fluid overload in the setting of atrial fibrillation with RVR.  She underwent dialysis last night with improvement in her symptoms.  She is back to her baseline O2 requirements.  AFib with RVR resolved; currently rates are well controlled but remains in atrial fibrillation.  She was found to be subtherapeutic on admission; advised to increase home warfarin to 5 mg daily for the next 2 days and have INR checked by home health nurse on Wednesday and follow-up with cardiology.    Instructions provided to follow up with primary care physician and cardiology as outpatient. Patient verbalized understanding and is aware to contact primary care physician or return to ED if new or worsening symptoms.    Physical exam on the day of discharge:  General: Patient resting comfortably in no acute distress.  Lungs: CTA. Good air entry.  Bibasilar crackles audible  Cor:  Irregularly irregular. No murmurs. No pedal edema.  Abd: Soft. Nontender. Non-distended.  Neuro: A&O x3. Moving all 4 extremities equally  Ext: No clubbing. No cyanosis.  Left thigh dressing is clean, dry and intact       Consults:   Consults (From admission, onward)        Status Ordering Provider     Inpatient consult to  Cardiology  Once     Provider:  Jerrod Asif MD    Completed GABE, TWAN     Inpatient consult to Hospitalist  Once     Provider:  Twan Yu MD    Acknowledged GABE, TWAN     Inpatient consult to Nephrology  Once     Provider:  Joseph Ingram MD    Acknowledged GABE, TWAN     Inpatient consult to Registered Dietitian/Nutritionist  Once     Provider:  (Not yet assigned)    Acknowledged GABE, TWAN     Pharmacy to dose Warfarin consult  Once     Provider:  (Not yet assigned)    Acknowledged GABE, TWAN          No new Assessment & Plan notes have been filed under this hospital service since the last note was generated.  Service: Hospital Medicine    Final Active Diagnoses:    Diagnosis Date Noted POA    Atrial thrombus [I51.3] 09/05/2020 Yes    Long term current use of anticoagulant [Z79.01] 09/05/2020 Not Applicable    Poorly-controlled hypertension [I10] 09/05/2020 Yes    DNR (do not resuscitate) [Z66] 09/30/2019 Yes     Chronic    ESRD (end stage renal disease) on HD M,W, F [N18.6] 05/11/2016 Yes     Chronic    Tobacco dependence [F17.200] 09/09/2013 Yes     Chronic    HTN (hypertension) [I10] 08/15/2013 Yes     Chronic      Problems Resolved During this Admission:    Diagnosis Date Noted Date Resolved POA    PRINCIPAL PROBLEM:  Atrial fibrillation with RVR [I48.91] 05/09/2020 09/06/2020 Yes    Acute on chronic combined systolic and diastolic heart failure [I50.43] 09/05/2020 09/06/2020 Yes    Acute on chronic respiratory failure with hypoxia [J96.21] 06/05/2020 09/06/2020 Yes       Discharged Condition: fair    Disposition: Home-Health Care [facility]    Follow Up:  Follow-up Information     Kalie Neely MD In 2 weeks.    Specialty: Family Medicine  Why: As needed  Contact information:  [address]  [address]  [facility] [facility] [address]  [phone]             Katharina Powell MD. Schedule an appointment as soon as possible for a visit in 2 weeks.    Specialties: Cardiovascular Disease,  Cardiology, INTERVENTIONAL CARDIOLOGY  Why: As needed  Contact information:  [address] [facility]  [address]  [place] [address]  [phone]                 Patient Instructions:      Diet Cardiac     Diet renal     Notify your health care provider if you experience any of the following:  temperature >100.4     Notify your health care provider if you experience any of the following:  persistent nausea and vomiting or diarrhea     Notify your health care provider if you experience any of the following:  persistent dizziness, light-headedness, or visual disturbances     Notify your health care provider if you experience any of the following:  difficulty breathing or increased cough     Notify your health care provider if you experience any of the following:   Order Comments: Recurrent or worsening symptoms     Notify your health care provider if you experience any of the following:  increased confusion or weakness     Activity as tolerated       Significant Diagnostic Studies: Labs:   CMP   Recent Labs   Lab 09/05/20 1128 09/06/20  0400    139   K 4.3 4.4   CL 94* 95   CO2 33* 30*   GLU 83 52*   BUN 35* 42*   CREATININE 5.1* 5.8*   CALCIUM 8.6* 8.4*   PROT 7.7 6.8   ALBUMIN 3.1* 2.8*   BILITOT 1.2* 1.4*   ALKPHOS 140* 129   AST 43* 27   ALT 24 18   ANIONGAP 12 14   ESTGFRAFRICA 8.9* 7.7*   EGFRNONAA 7.8* 6.6*   , CBC   Recent Labs   Lab 09/05/20  1128 09/06/20  0400   WBC 7.02 4.93   HGB 10.6* 9.7*   HCT 34.9* 31.6*    221    and INR   Lab Results   Component Value Date    INR 1.4 09/06/2020    INR 1.3 09/05/2020    INR 1.2 08/19/2020       Pending Diagnostic Studies:     Procedure Component Value Units Date/Time    Hepatitis B surface antibody [[id number]] Collected: 09/05/20 1700    Order Status: Sent Lab Status: In process Updated: 09/05/20 1705    Specimen: Blood     Narrative:      Collection has been rescheduled by GBT at 09/05/2020 15:56 Reason: to   be drawn when pt goes to Dialysis  Collection has  been rescheduled by GBT at 09/05/2020 15:57 Reason:   tubes left with cinthia    Hepatitis B surface antigen [[id number]] Collected: 09/05/20 1700    Order Status: Sent Lab Status: In process Updated: 09/05/20 1705    Specimen: Blood     Narrative:      Collection has been rescheduled by GBT at 09/05/2020 15:56 Reason: to   be drawn when pt goes to Dialysis  Collection has been rescheduled by GBT at 09/05/2020 15:57 Reason:   tubes left with cinthia         Medications:  Reconciled Home Medications:      Medication List      CHANGE how you take these medications    warfarin 5 MG tablet  Commonly known as: COUMADIN  Take 5 mg by mouth once daily.  What changed: Another medication with the same name was removed. Continue taking this medication, and follow the directions you see here.        CONTINUE taking these medications    aspirin 81 MG EC tablet  Commonly known as: ECOTRIN  Take 81 mg by mouth once daily.     calcium acetate(phosphat bind) 667 mg capsule  Commonly known as: PHOSLO  Take 667 mg by mouth once daily.     diltiaZEM 180 MG 24 hr capsule  Commonly known as: CARDIZEM CD  Take 180 mg by mouth once daily.     isosorbide mononitrate 30 MG 24 hr tablet  Commonly known as: IMDUR  Take 30 mg by mouth once daily.     losartan 25 MG tablet  Commonly known as: COZAAR  Take 25 mg by mouth once daily.     magnesium oxide 400 mg (241.3 mg magnesium) tablet  Commonly known as: MAG-OX  Take 1 tablet by mouth once daily     metoprolol succinate 25 MG 24 hr tablet  Commonly known as: TOPROL-XL  Take 1 tablet (25 mg total) by mouth nightly.     NARCAN 4 mg/actuation [place]  Generic drug: naloxone  CALL 911. ADMINISTER A SINGLE SPRAY INTRANASALLY INTO ONE NOSTRIL UPON SIGNS OF OPIOID OVERDOSE. MAY REPEAT AFTER 3 MINUTES IF NO RESPONSE.     ondansetron 4 MG Tbdl  Commonly known as: ZOFRAN-ODT  Take 4 mg by mouth every 6 (six) hours as needed.     oxyCODONE-acetaminophen 5-325 mg per tablet  Commonly known as: PERCOCET  Take 1 tablet  by mouth every 6 (six) hours as needed for Pain.     pantoprazole 40 MG tablet  Commonly known as: PROTONIX  Take 1 tablet (40 mg total) by mouth 2 (two) times daily.     JENNIFER-KODAK ORAL  Take 1 tablet by mouth once daily.     traMADoL 50 mg tablet  Commonly known as: ULTRAM  Take 50 mg by mouth every 8 (eight) hours as needed for Pain.     zinc sulfate 220 mg Tab tablet  Take 220 mg by mouth every other day.            Indwelling Lines/Drains at time of discharge:   Lines/Drains/Airways     Drain                 Hemodialysis AV Fistula 08/08/19 0214 Right upper arm 395 days                Time spent on the discharge of patient: 35 minutes  Patient was seen and examined on the date of discharge and determined to be suitable for discharge.         Homer Carlson MD  Department of Hospital Medicine  Critical access hospital

## 2020-09-06 NOTE — DISCHARGE INSTRUCTIONS
Pt to take Coumadin as ordered, and make Appointment with Dr Frazier-- call office on Tuesday 9/8/2020.  Pt and sister agreed.  Full D/C instructions discussed with pt and Sister ( Aide) at bedside per Dr Rojas

## 2020-09-06 NOTE — NURSING
Walked pt around entire MICU/SICU .  Tolerated well. Dr Rojas notified.  Plans for D/C to home underway.  Pt's sister Aide in route to [facility] for d/c

## 2020-09-06 NOTE — PLAN OF CARE
09/06/20 1510   Final Note   Assessment Type Final Discharge Note   Anticipated Discharge Disposition Home-Health   Order noted for resumption of home health and patient current with [facility]. Spoke with Tere at [facility] home health and with patient being less than 24 hour admit not actually a resumption of care.

## 2020-09-06 NOTE — PROGRESS NOTES
[facility]  Department of Cardiology  Progress Note      PATIENT NAME: Griselda Neely  MRN: [id number]  TODAY'S DATE: 09/06/2020  ADMIT DATE: 9/5/2020                          CONSULT REQUESTED BY: Homer Carlson MD    SUBJECTIVE   9/6/2020 INTERVAL HISTORY:      Ms. Neely is sitting up in the chair in no distress. She is now on nasal cannula on 2 liters  She is doing well and eager to go home.  She is feeling much better overall.  Denies having any chest discomfort  Heart rates much better controlled          PRINCIPAL PROBLEM: Acute on chronic respiratory failure      REASON FOR CONSULT:  AFIB with RVR  CHF       HPI:  75 year old female patient followed by Dr. Powell  She has a history of f Anemia, Atrial fibrillation, Calciphylaxis (07/2017), CHF (congestive heart failure), Encounter for blood transfusion (03/2016), Gout, Hemodialysis access, AV graft, Hypertension, Mitral valve regurgitation, Osteoarthritis, Pancreatitis, Peripheral vascular disease, Pneumonia (09/09/2017), and Renal failure.  She went to Dialysis yesterday and stayed the whole time and was doing well last night  She woke up this am with SOB that was severe   EMS Called   Upon arrival to ER Sats in 80s Bipap initiated  AFIB with RVR and HTN  Patient seen in the ER          FROM H AND P      Patient information was obtained from patient, past medical records and ER records.   Case personally discussed with Dr. Sanford in the ER, Dr. KIKE Asif and Dr. Ingram    HISTORY OF PRESENT ILLNESS:   Griselda Neely is a 75 y.o. AAF female who  has a past medical history of Anemia, Atrial fibrillation, Calciphylaxis (07/2017), CHF (congestive heart failure), Encounter for blood transfusion (03/2016), Gout, Hemodialysis access, AV graft, Hypertension, Mitral valve regurgitation, Osteoarthritis, Pancreatitis, Peripheral vascular disease, Pneumonia (09/09/2017), and Renal failure.. The patient presented to [facility] on  9/5/2020 with a primary complaint of Shortness of Breath (THIS AM)     Patient presented to ER with complaint of sudden onset of shortness of breath this morning.  EMS was called, her initial O2 sat was in 80s.  She was brought to the ER.  Upon arrival, blood pressure was 132/80, respiration 28, heart rate 144, temperature 97.8°.  Patient was started on BiPAP and O2 sat improved to 100%.  Patient was given Cardizem time to IV with no improvement and Cardizem drip was initiated.  Patient heart rate remained in 140s and 130s on Cardizem drip.  Sister informed me that patient had her hemodialysis yesterday as scheduled.  X-ray chest indicates fluid overload.  Patient does not create urine.      After my evaluation, patient called the nurse and informed her that she feels she is having a heart attack.  Her blood pressure was high in 170s over 100's.  I re-evaluated the patient again in the ER.  Dr. KIKE Asif was consulted.  Troponin and stat EKG ordered.  Dr. KIKE Asif evaluated the patient in the ER.  Ordered metoprolol 2.5 mg IV x1 which improved the heart rate as well as her blood pressure improved to 140s over 80s  I also called Dr. Ingram, discussed the case, he will place the dialysis order for ultrafiltration.  Patient will be admitted to ICU per Dr. KIKE Asif as advised     Noted CBC shows H&H 10.6/34.9 with platelet 263  PT INR 15.7/1.3 (subtherapeutic)  CMP shows chloride 94, bicarb 33, BUN/creatinine 35/5.1 with GFR 8.9, alk-phos 140, albumin 3.1, T bili 1.2, AST 43, ALT 24  BNP greater than 4500, troponin 0.036  COVID rapid negative  X-ray personally reviewed shows cardiomegaly and pulmonary edema  EKG personally reviewed shows AFib with RVR and T-wave abnormality           Review of patient's allergies indicates:  No Known Allergies    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    both legs    CHF (congestive heart failure)     Encounter for blood  transfusion 03/2016    Gout     Hemodialysis access, AV graft     Hypertension     Mitral valve regurgitation     Osteoarthritis     Pancreatitis     Peripheral vascular disease     Pneumonia 09/09/2017    Renal failure      Past Surgical History:   Procedure Laterality Date    ACHILLES TENDON SURGERY Right     ANGIOGRAPHY OF ARTERIOVENOUS SHUNT Right 1/7/2020    Procedure: Fistulogram with Possible Intervention;  Surgeon: Joseph Loyola MD;  Location: [facility] CATH/EP LAB;  Service: Cardiology;  Laterality: Right;    ANGIOGRAPHY OF LOWER EXTREMITY Left 3/18/2020    Procedure: Angiogram Extremity Unilateral;  Surgeon: Ali Khoobehi, MD;  Location: [facility] CATH/EP LAB;  Service: Cardiology;  Laterality: Left;    APPENDECTOMY      CARDIAC CATHETERIZATION  07/03/2017    CHOLECYSTECTOMY      ESOPHAGOGASTRODUODENOSCOPY Left 8/17/2020    Procedure: EGD (ESOPHAGOGASTRODUODENOSCOPY);  Surgeon: Talat Trevizo MD;  Location: [facility] ENDO;  Service: Endoscopy;  Laterality: Left;    heal surgery Right     HYSTERECTOMY      INSERTION OF STENT INTO PERIPHERAL VESSEL N/A 1/7/2020    Procedure: INSERTION, STENT, VESSEL, PERIPHERAL;  Surgeon: Joseph Loyola MD;  Location: [facility] CATH/EP LAB;  Service: Cardiology;  Laterality: N/A;    MITRAL VALVE REPLACEMENT      PARATHYROIDECTOMY      PARATHYROIDECTOMY  07/13/2017    PERCUTANEOUS TRANSLUMINAL ANGIOPLASTY OF ARTERIOVENOUS FISTULA N/A 1/7/2020    Procedure: PTA, AV FISTULA;  Surgeon: Joseph Loyola MD;  Location: [facility] CATH/EP LAB;  Service: Cardiology;  Laterality: N/A;    PERITONEAL CATHETER INSERTION      RENAL BIOPSY      vocal cord nodule      WOUND DEBRIDEMENT Left 7/13/2020    Procedure: DEBRIDEMENT, WOUND;  Surgeon: Carlos Elam III, MD;  Location: [facility] OR;  Service: General;  Laterality: Left;     Social History     Tobacco Use    Smoking status: Current Some Day Smoker     Packs/day: 0.50     Years: 45.00     Pack years: 22.50     Types:  Cigarettes    Smokeless tobacco: Never Used   Substance Use Topics    Alcohol use: No    Drug use: No        REVIEW OF SYSTEMS  CONSTITUTIONAL: Negative for chills, fatigue and fever.  Apparently patient had a recent fall with bilateral periorbital swelling noted.  Along with ecchymosis  EYES: No double vision, No blurred vision  NEURO: No headaches, No dizziness  RESPIRATORY: sob+   CARDIOVASCULAR: Negative for chest pain. Negative for palpitations and leg swelling.   GI: Negative for abdominal pain, No melena, diarrhea, nausea and vomiting.   : Negative for dysuria and frequency, Negative for hematuria  SKIN: Negative for bruising, Negative for edema or discoloration noted.   ENDOCRINE: Negative for polyphagia, Negative for heat intolerance, Negative for cold intolerance  PSYCHIATRIC: Negative for depression, Negative for anxiety, Negative for memory loss  MUSCULOSKELETAL: Negative for neck pain, Negative for muscle weakness, Negative for back pain     OBJECTIVE     VITAL SIGNS (Most Recent)  Temp: 98 °F (36.7 °C) (09/06/20 0730)  Pulse: 70 (09/06/20 0800)  Resp: (!) 22 (09/06/20 0730)  BP: (!) 140/66 (09/06/20 0730)  SpO2: 99 % (09/06/20 0800)    VENTILATION STATUS  Resp: (!) 22 (09/06/20 0730)  SpO2: 99 % (09/06/20 0800)  Oxygen Concentration (%):  [30-32] 32    I & O (Last 24H):    Intake/Output Summary (Last 24 hours) at 9/6/2020 0948  Last data filed at 9/6/2020 0500  Gross per 24 hour   Intake 124.83 ml   Output 3000 ml   Net -2875.17 ml       WEIGHTS  Wt Readings from Last 1 Encounters:   09/05/20 1602 59.9 kg (132 lb 0.9 oz)   09/05/20 1059 54.4 kg (120 lb)       PHYSICAL EXAM  GENERAL: thin female on bipap  HEENT: Normocephalic. Pupils normal and conjunctivae normal.  Mucous membranes normal, no cyanosis or icterus, trachea central,no pallor or icterus is noted..  Bilateral periorbital edema and ecchymosis is present.  NECK: No JVD. No bruit..   THYROID: Thyroid not enlarged. No nodules present..    CARDIAC: IRR  CHEST ANATOMY: normal.   LUNGS: Clear  ABDOMEN: Soft no masses or organomegaly.  No abdomen pulsations or bruits.  Normal bowel sounds. No pulsations and no masses felt, No guarding or rebound.   URINARY: No núñez catheter   EXTREMITIES: No cyanosis, clubbing or edema noted at this time., no calf tenderness bilaterally.   PERIPHERAL VASCULAR SYSTEM: Good palpable distal pulses.   CENTRAL NERVOUS SYSTEM: No focal motor or sensory deficits noted.   SKIN: Skin without lesions, moist, well perfused.   MUSCLE STRENGTH & TONE: No noteable weakness, atrophy or abnormal movement.     HOME MEDICATIONS:  No current facility-administered medications on file prior to encounter.      Current Outpatient Medications on File Prior to Encounter   Medication Sig Dispense Refill    aspirin (ECOTRIN) 81 MG EC tablet Take 81 mg by mouth once daily.      calcium acetate (PHOSLO) 667 mg capsule Take 667 mg by mouth once daily.       diltiaZEM (CARDIZEM CD) 180 MG 24 hr capsule Take 180 mg by mouth once daily.      folic acid/vit B complex and C (JENNIFER-KODAK ORAL) Take 1 tablet by mouth once daily.      isosorbide mononitrate (IMDUR) 30 MG 24 hr tablet Take 30 mg by mouth once daily.      losartan (COZAAR) 25 MG tablet Take 25 mg by mouth once daily.      magnesium oxide (MAG-OX) 400 mg (241.3 mg magnesium) tablet Take 1 tablet by mouth once daily 30 tablet 0    metoprolol succinate (TOPROL-XL) 25 MG 24 hr tablet Take 1 tablet (25 mg total) by mouth nightly. 30 tablet 0    ondansetron (ZOFRAN-ODT) 4 MG TbDL Take 4 mg by mouth every 6 (six) hours as needed.      oxyCODONE-acetaminophen (PERCOCET) 5-325 mg per tablet Take 1 tablet by mouth every 6 (six) hours as needed for Pain.      pantoprazole (PROTONIX) 40 MG tablet Take 1 tablet (40 mg total) by mouth 2 (two) times daily. 60 tablet 0    traMADoL (ULTRAM) 50 mg tablet Take 50 mg by mouth every 8 (eight) hours as needed for Pain.      warfarin (COUMADIN) 5 MG  tablet Take 5 mg by mouth once daily.      zinc sulfate 220 mg Tab tablet Take 220 mg by mouth every other day.      NARCAN 4 mg/actuation [place] CALL 911. ADMINISTER A SINGLE SPRAY INTRANASALLY INTO ONE NOSTRIL UPON SIGNS OF OPIOID OVERDOSE. MAY REPEAT AFTER 3 MINUTES IF NO RESPONSE.      warfarin (COUMADIN) 2.5 MG tablet Take 1 tablet (2.5 mg total) by mouth Daily. 30 tablet 0       SCHEDULED MEDS:   sodium chloride 0.9%   Intravenous Once    aspirin  81 mg Oral Daily    calcium acetate(phosphat bind)  667 mg Oral Daily    chlorhexidine  15 mL Mouth/Throat BID    isosorbide mononitrate  30 mg Oral Daily    losartan  25 mg Oral Daily    magnesium oxide  400 mg Oral Daily    metoprolol succinate  25 mg Oral Nightly    mupirocin   Nasal BID    pantoprazole  40 mg Oral BID    warfarin  2.5 mg Oral Daily       CONTINUOUS INFUSIONS:   dilTIAZem 10 mg/hr (09/06/20 0500)       PRN MEDS:sodium chloride 0.9%, acetaminophen, acetaminophen, dextrose 50%, dextrose 50%, glucose, glucose, ondansetron, oxyCODONE-acetaminophen, polyethylene glycol, sodium chloride 0.9%, traMADoL    LABS AND DIAGNOSTICS     CBC LAST 3 DAYS  Recent Labs   Lab 09/05/20  1128 09/06/20  0400   WBC 7.02 4.93   RBC 3.71* 3.38*   HGB 10.6* 9.7*   HCT 34.9* 31.6*   MCV 94 94   MCH 28.6 28.7   MCHC 30.4* 30.7*   RDW 16.0* 16.2*    221   MPV 10.4 11.3   GRAN 81.1*  5.7 68.2  3.4   LYMPH 11.0*  0.8* 18.9  0.9*   MONO 6.4  0.5 9.7  0.5   BASO 0.03 0.03   NRBC 0 0       COAGULATION LAST 3 DAYS  Recent Labs   Lab 09/05/20  1128 09/06/20  0400   LABPT 15.7* 16.1*   INR 1.3 1.4       CHEMISTRY LAST 3 DAYS  Recent Labs   Lab 09/05/20  1128 09/05/20  1321 09/06/20  0400     --  139   K 4.3  --  4.4   CL 94*  --  95   CO2 33*  --  30*   ANIONGAP 12  --  14   BUN 35*  --  42*   CREATININE 5.1*  --  5.8*   GLU 83  --  52*   CALCIUM 8.6*  --  8.4*   MG  --  1.9 1.8   ALBUMIN 3.1*  --  2.8*   PROT 7.7  --  6.8   ALKPHOS 140*  --  129   ALT  24  --  18   AST 43*  --  27   BILITOT 1.2*  --  1.4*       CARDIAC PROFILE LAST 3 DAYS  Recent Labs   Lab 09/05/20  1128 09/05/20  1321   BNP >4,500*  --    TROPONINI 0.036 0.031       ENDOCRINE LAST 3 DAYS  No results for input(s): TSH, PROCAL in the last 168 hours.    LAST ARTERIAL BLOOD GAS  ABG  No results for input(s): PH, PO2, PCO2, HCO3, BE in the last 168 hours.    LAST 7 DAYS MICROBIOLOGY   Microbiology Results (last 7 days)     ** No results found for the last 168 hours. **          MOST RECENT IMAGING  X-Ray Chest AP Portable  HISTORY: Dyspnea, CHF.    FINDINGS: Portable chest radiograph at 1139 hours compared to  08/17/2020 shows the cardiac silhouette is moderately enlarged, stable  in size, with the pulmonary vasculature mildly prominent centrally,  stable. There are aortic vascular calcifications and changes of prior  valvuloplasty.    There are scattered reticulonodular densities throughout both lungs,  with right midlung and bilateral lower lung zone airspace opacities,  as well as small bilateral pleural effusions, obscuring the diaphragm.  There is no pneumothorax. The bones are diffusely osteopenic.    IMPRESSION:  1. Mild scattered interstitial opacities in both lungs, potentially  pulmonary edema and or hypervolemia, or viral or atypical pneumonia.  2. Nonspecific lower lung airspace opacities suggesting atelectasis  and or pneumonia, with small bilateral pleural effusions.  3. Stable moderate cardiomegaly.    Electronically Signed by Brian CARCAMO on 9/5/2020 12:15 PM      ECHOCARDIOGRAM RESULTS (last 5)  Results for orders placed during the hospital encounter of 07/22/20   Transesophageal echo (TRUPTI)    Narrative Indication:   Left atrial thrombus.     Counseling:   Patient as well as her family member were informed of the risks, benefits   as well as alternatives to the procedure and informed consent was   obtained.     Procedure:  After obtaining informed consent the patient was brought  in to the cath   lab in a fasting state and with an IV in place. Patient was sedated with   anesthesia team help. A TRUPTI probe was advanced without difficulty and   images were obtained. Patient tolerated the procedure well and no   immediate complications were noted. Patient was transferred out of the   heart center in stable medical condition.     Complications:   None     Findings:  1.  Large echogenic mass  in the roof of the left atrium seen on previous   TRUPTI has resolved. Significant amount of smoke and possible soft thrombus   noted in the left atrial appendage extending into the left atrium.  2.  Mitral valve bioprosthesis in stable position.  Leaflets appear to be   moving well.  Mild mitral regurgitation noted.  3.  Mild tricuspid regurgitation noted.  4. Aortic valve appears calcified. There appears to be mild restrictions   of leaflet motion. No significant regurgitation.   5. Pulmonic valve was not well visualized.   6.  No obvious immediate complications.    Conclusions:  1.  Large echogenic mass in the roof of the left atrium seen on previous   TRUPTI has resolved. Significant amount of smoke and possible soft thrombus   noted in the left atrial appendage extending into the left atrium.     Results for orders placed during the hospital encounter of 01/02/20   Transesophageal echo (TRUPTI)    Narrative 1.  Large echogenic mass likely representing clot noted in the roof of the   left atrium and in the left atrial appendage.  2.  Mitral valve bioprosthesis in stable position.  Leaflets appear to be   moving well.  Mild mitral regurgitation noted.  3.  Mild tricuspid regurgitation noted.  4.  Mild plaque noted in the visualized portions of the descending   thoracic aorta.  5.  No obvious immediate complications.   Echo Color Flow Doppler? Yes    Narrative · Eccentric left ventricular hypertrophy.  · Severely decreased left ventricular systolic function. The estimated   ejection fraction is 25%.  · Grade IV  (severe) left ventricular diastolic dysfunction.  · Moderate right ventricular enlargement.  · Moderately reduced right ventricular systolic function.  · Severe left atrial enlargement.  · Layered left atrial thrombus suggested. The thrombus is fixed and   located in the inferior cavity.  · Moderate right atrial enlargement.  · There is a bioprosthetic mitral valve. Prosthetic mitral valve is   normal.  · Moderate tricuspid regurgitation.  · Mild to moderate pulmonary hypertension present. Estimated PA systolic   pressure 50 mm of Hg.  · Mild to moderate pulmonic regurgitation.  · Small posterior pericardial effusion.      Results for orders placed during the hospital encounter of 11/13/17   2D echo with color flow doppler    Narrative Date of Procedure: 11/13/2017        TEST DESCRIPTION   Technical Quality: This is a technically adequate study.     Aorta: The aortic root is normal in size, measuring 2.1 cm at sinotubular junction and 2.7 cm at Sinuses of Valsalva. The proximal ascending aorta is normal in size, measuring 3.0 cm across.     Left Atrium: The left atrial volume index is severely enlarged, measuring 118.16 cc/m2.     Left Ventricle: The left ventricle is normal in size, with an end-diastolic diameter of 5.4 cm, and an end-systolic diameter of 3.5 cm. LV wall thickness is normal, with the septum and the posterior wall each measuring 0.9 cm across. Relative wall   thickness was normal at 0.33, and the LV mass index was increased at 136.0 g/m2 consistent with severe eccentric left ventricular hypertrophy. There are no regional wall motion abnormalities. Left ventricular systolic function appears hyperdynamic.   Visually estimated ejection fraction is >70%.         Right Atrium: The right atrium is normal in size, measuring 4.5 cm in length and 2.8 cm in width in the apical view.     Right Ventricle: The right ventricle is normal in size measuring 4.6 cm at the base in the apical right ventricle-focused  view. Global right ventricular systolic function appears normal. Tricuspid annular plane systolic excursion (TAPSE) is 2.5 cm.   Tissue Doppler-derived tricuspid annular peak systolic velocity (S prime) is 15.3 cm/s. The estimated PA systolic pressure is 29 mmHg.     Aortic Valve:  The aortic valve is mildly sclerotic with normal leaflet mobility.     Mitral Valve:  The mitral valve is mildly sclerotic with mildly restricted leaflet mobility. The mean gradient obtained across the mitral valve is 8 mmHg. The pressure half time is 112.0 msec. The calculated mitral valve area is 1.96 cm2 consistent with   mild mitral stenosis. There is moderate to severe mitral regurgitation. There is marked mitral annular calcification.     Tricuspid Valve:  The tricuspid valve is normal in structure. There is mild tricuspid regurgitation.     Pulmonary Valve:  The pulmonic valve is normal in structure.     IVC: IVC is normal in size and collapses > 50% with a sniff, suggesting normal right atrial pressure of 3 mmHg.     Intracavitary: There is no evidence of pericardial effusion, intracavity mass, thrombi, or vegetation.         CONCLUSIONS     1 - Eccentric LVH with hyperdynamic left ventricular systolic function (EF >70%).     2 - Severe left atrial enlargement.     3 - Normal right ventricular systolic function .     4 - Significant mitral annular calcification with mean gradient of 8mmHg and +3 MR.             This document has been electronically    SIGNED BY: Elyssa Carbajal MD On: 11/13/2017 10:25       CURRENT/PREVIOUS VISIT EKG  Results for orders placed or performed during the hospital encounter of 09/05/20   EKG 12-lead    Collection Time: 09/05/20  1:20 PM    Narrative    Test Reason : R07.9,    Vent. Rate : 114 BPM     Atrial Rate : 105 BPM     P-R Int : 000 ms          QRS Dur : 088 ms      QT Int : 370 ms       P-R-T Axes : 000 -16 091 degrees     QTc Int : 509 ms    Atrial fibrillation with rapid ventricular  response  Abnormal QRS-T angle, consider primary T wave abnormality  Abnormal ECG  When compared with ECG of 05-SEP-2020 11:17,  Atrial fibrillation has replaced Atrial flutter  ST no longer depressed in Anterior leads  Confirmed by Jerrod Asif MD ([id number]) on 9/5/2020 5:46:11 PM    Referred By: MURTAZA   SELF           Confirmed By:Jerrod Asif MD           ASSESSMENT/PLAN:     Active Hospital Problems    Diagnosis    *Acute on chronic respiratory failure    Acute on chronic heart failure    Atrial thrombus    Long term current use of anticoagulant    Acute respiratory failure    Poorly-controlled hypertension    Congestive heart failure    Atrial fibrillation with RVR    DNR (do not resuscitate)    ESRD (end stage renal disease) on HD M,W, F    Tobacco dependence    HTN (hypertension)       ASSESSMENT & PLAN:       1. AFIB with RVR- IMPROVED- rates are better  2. Acute CHF Exacerbation-IMPROVED S/P Dialysis  3. HTN  4. ESRD ON HD  5. HTN  6. Left Atrial Thrombus  7. Chronic Coumadin currently INR is subtherapeutic 1.2  8. Acute on Chronic Respiratory failure secondary to #1 and 2      RECOMMENDATIONS:    Pulmonary Edema and oxygenation is improved  INR is subtherapeutic- consider lovenox addition  Until INR is at least 1.8  Increase Coumadin to 5 mg 1 time today the 2.5 daily  Continue Cardizem 180 mg daily  No further recommendations  She can follow up in the office with Dr. Powell at discharge    Mary Do NP  [facility]  Department of Cardiology  Date of Service: 09/06/2020        I have personally interviewed and examined the patient, I have reviewed the Nurse Practitioner's history and physical, assessment, and plan. I agree with the findings and plan.      Jerrod Asif M.D.  [facility]  Department of Cardiology  Date of Service: 09/06/2020  2:46 PM

## 2020-09-08 ENCOUNTER — TELEPHONE (OUTPATIENT)
Dept: FAMILY MEDICINE | Facility: CLINIC | Age: 75
End: 2020-09-08

## 2020-09-08 ENCOUNTER — OFFICE VISIT (OUTPATIENT)
Dept: SURGERY | Facility: CLINIC | Age: 75
End: 2020-09-08
Payer: MEDICARE

## 2020-09-08 VITALS
HEART RATE: 65 BPM | SYSTOLIC BLOOD PRESSURE: 123 MMHG | DIASTOLIC BLOOD PRESSURE: 82 MMHG | BODY MASS INDEX: 19.99 KG/M2 | WEIGHT: 120 LBS | TEMPERATURE: 95 F | HEIGHT: 65 IN

## 2020-09-08 DIAGNOSIS — L97.922 CALCIPHYLAXIS OF LEFT LOWER EXTREMITY WITH NONHEALING ULCER WITH FAT LAYER EXPOSED: Primary | ICD-10-CM

## 2020-09-08 DIAGNOSIS — E83.59 CALCIPHYLAXIS OF LEFT LOWER EXTREMITY WITH NONHEALING ULCER WITH FAT LAYER EXPOSED: Primary | ICD-10-CM

## 2020-09-08 LAB
HBV SURFACE AB SER QL: REACTIVE
HBV SURFACE AG SERPL QL IA: NEGATIVE

## 2020-09-08 PROCEDURE — 99024 POSTOP FOLLOW-UP VISIT: CPT | Mod: S$GLB,,, | Performed by: SURGERY

## 2020-09-08 PROCEDURE — 99024 PR POST-OP FOLLOW-UP VISIT: ICD-10-PCS | Mod: S$GLB,,, | Performed by: SURGERY

## 2020-09-08 NOTE — PROGRESS NOTES
Subjective:       Patient ID: Griselda Neely is a 75 y.o. female.    Chief Complaint: Post-op Evaluation (Leg Wound Debridement 7/13/2020 (1st p/o))      HPI:  Patient returns for wound check. She has been having home health treat wound. She has no new complaints. The pain is similar and related to the calciphylaxis.     Review of Systems    Objective:      Physical Exam  Pulmonary:      Effort: Pulmonary effort is normal. No respiratory distress.   Skin:     Comments: Wound is more shallow. Now nearly flush with the skin. Wound bed has good granulation.    Neurological:      Mental Status: She is alert and oriented to person, place, and time.         Assessment/Plan:   Griselda was seen today for post-op evaluation.    Diagnoses and all orders for this visit:    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    Other orders  -     wheelchair An; 1 Device by Misc.(Non-Drug; Combo Route) route as needed (needed for transport).      Doing well. Wound looks to be improving. Continue current care.

## 2020-09-10 ENCOUNTER — DOCUMENT SCAN (OUTPATIENT)
Dept: HOME HEALTH SERVICES | Facility: HOSPITAL | Age: 75
End: 2020-09-10

## 2020-09-10 ENCOUNTER — TELEPHONE (OUTPATIENT)
Dept: CARDIOLOGY | Facility: CLINIC | Age: 75
End: 2020-09-10

## 2020-09-10 NOTE — TELEPHONE ENCOUNTER
----- Message from Sharron Levine LPN sent at 9/10/2020 12:31 PM CDT -----  Regarding: INR results  Contact: Jenny Alvarado with [facility] -[phone]    INR- 1.9  PT-23.4  Patient is currently taking Coumadin 5mg daily.  Was in [facility] over the weekend with A-fib.  Discharged Sunday.

## 2020-09-14 ENCOUNTER — DOCUMENT SCAN (OUTPATIENT)
Dept: HOME HEALTH SERVICES | Facility: HOSPITAL | Age: 75
End: 2020-09-14

## 2020-09-15 NOTE — PROGRESS NOTES
Subjective:       Patient ID: Griselda Neely is a 75 y.o. female.    Chief Complaint: Post-op Evaluation (FU DOS 7/13/2020 Leg Wound Debridement )      HPI:  Patient has history of calciphylaxis with a large nonhealing left leg wound. It was very painful. I performed debridement of the wound while she was inpatient. Wound feels little better. She has home health dressing the wound. She is afebrile.     Review of Systems    Objective:      Physical Exam  Pulmonary:      Effort: Pulmonary effort is normal. No respiratory distress.   Skin:            Comments: Wound is clean. Tissue appears healthy.  around the edges. Looking improved   Neurological:      Mental Status: She is alert and oriented to person, place, and time.         Assessment/Plan:   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      Looking better. Continue current wound care.RTC one month for wound check.

## 2020-09-17 ENCOUNTER — DOCUMENT SCAN (OUTPATIENT)
Dept: HOME HEALTH SERVICES | Facility: HOSPITAL | Age: 75
End: 2020-09-17
Payer: MEDICARE

## 2020-09-17 PROCEDURE — 99499 NO LOS: ICD-10-PCS | Mod: ,,, | Performed by: INTERNAL MEDICINE

## 2020-09-17 PROCEDURE — 99499 UNLISTED E&M SERVICE: CPT | Mod: ,,, | Performed by: INTERNAL MEDICINE

## 2020-09-18 ENCOUNTER — DOCUMENT SCAN (OUTPATIENT)
Dept: HOME HEALTH SERVICES | Facility: HOSPITAL | Age: 75
End: 2020-09-18
Payer: MEDICARE

## 2020-09-18 RX ORDER — METOPROLOL SUCCINATE 25 MG/1
25 TABLET, EXTENDED RELEASE ORAL NIGHTLY
Qty: 30 TABLET | Refills: 0 | Status: SHIPPED | OUTPATIENT
Start: 2020-09-18 | End: 2020-12-10

## 2020-09-18 RX ORDER — PANTOPRAZOLE SODIUM 40 MG/1
40 TABLET, DELAYED RELEASE ORAL DAILY
Qty: 90 TABLET | Refills: 0 | Status: SHIPPED | OUTPATIENT
Start: 2020-09-18 | End: 2021-03-02 | Stop reason: SDUPTHER

## 2020-09-18 NOTE — TELEPHONE ENCOUNTER
Spoke with patients caregiver, pt declines to schedule at this time.  Will call to schedule at a later date -DN

## 2020-09-18 NOTE — TELEPHONE ENCOUNTER
----- Message from Melissa Fonseca sent at 9/18/2020  2:07 PM CDT -----  Regarding: INR Results  INR is 2.2 - she is on Warfarin 5mg daily

## 2020-09-18 NOTE — TELEPHONE ENCOUNTER
INR 2.2 Was suppose to take 7.5/5mg but patient was only getting 5mg qd. Per Dr. SHERWOOD pt needs to take 2.5mg tonight and alt 5mg tomorrow. Recheck INR Monday call with results.

## 2020-09-20 ENCOUNTER — HOSPITAL ENCOUNTER (INPATIENT)
Facility: HOSPITAL | Age: 75
LOS: 3 days | Discharge: HOME HEALTH | DRG: 291 | End: 2020-09-23
Attending: EMERGENCY MEDICINE | Admitting: INTERNAL MEDICINE
Payer: MEDICARE

## 2020-09-20 DIAGNOSIS — J96.20 ACUTE ON CHRONIC RESPIRATORY FAILURE: ICD-10-CM

## 2020-09-20 DIAGNOSIS — R07.9 CHEST PAIN: ICD-10-CM

## 2020-09-20 DIAGNOSIS — N18.6 ESRD (END STAGE RENAL DISEASE) ON DIALYSIS: ICD-10-CM

## 2020-09-20 DIAGNOSIS — R06.00 DYSPNEA: ICD-10-CM

## 2020-09-20 DIAGNOSIS — Z99.2 ESRD (END STAGE RENAL DISEASE) ON DIALYSIS: ICD-10-CM

## 2020-09-20 DIAGNOSIS — J96.21 ACUTE ON CHRONIC RESPIRATORY FAILURE WITH HYPOXIA: Primary | ICD-10-CM

## 2020-09-20 DIAGNOSIS — J44.1 COPD WITH ACUTE EXACERBATION: ICD-10-CM

## 2020-09-20 DIAGNOSIS — J90 PLEURAL EFFUSION, RIGHT: ICD-10-CM

## 2020-09-20 DIAGNOSIS — I48.91 ATRIAL FIBRILLATION WITH RVR: ICD-10-CM

## 2020-09-20 PROBLEM — I50.9 ACUTE ON CHRONIC HEART FAILURE: Status: ACTIVE | Noted: 2020-09-20

## 2020-09-20 LAB
ALBUMIN SERPL BCP-MCNC: 3.3 G/DL (ref 3.5–5.2)
ALP SERPL-CCNC: 128 U/L (ref 55–135)
ALT SERPL W/O P-5'-P-CCNC: 19 U/L (ref 10–44)
ANION GAP SERPL CALC-SCNC: 19 MMOL/L (ref 8–16)
AST SERPL-CCNC: 36 U/L (ref 10–40)
BASOPHILS # BLD AUTO: 0.03 K/UL (ref 0–0.2)
BASOPHILS NFR BLD: 0.4 % (ref 0–1.9)
BILIRUB SERPL-MCNC: 0.7 MG/DL (ref 0.1–1)
BNP SERPL-MCNC: >4500 PG/ML (ref 0–99)
BUN SERPL-MCNC: 84 MG/DL (ref 8–23)
CALCIUM SERPL-MCNC: 8.6 MG/DL (ref 8.7–10.5)
CHLORIDE SERPL-SCNC: 94 MMOL/L (ref 95–110)
CO2 SERPL-SCNC: 28 MMOL/L (ref 23–29)
CREAT SERPL-MCNC: 6.7 MG/DL (ref 0.5–1.4)
DIFFERENTIAL METHOD: ABNORMAL
EOSINOPHIL # BLD AUTO: 0.1 K/UL (ref 0–0.5)
EOSINOPHIL NFR BLD: 1.4 % (ref 0–8)
ERYTHROCYTE [DISTWIDTH] IN BLOOD BY AUTOMATED COUNT: 17.3 % (ref 11.5–14.5)
EST. GFR  (AFRICAN AMERICAN): 6.4 ML/MIN/1.73 M^2
EST. GFR  (NON AFRICAN AMERICAN): 5.6 ML/MIN/1.73 M^2
GLUCOSE SERPL-MCNC: 94 MG/DL (ref 70–110)
HCT VFR BLD AUTO: 29.8 % (ref 37–48.5)
HGB BLD-MCNC: 9.2 G/DL (ref 12–16)
IMM GRANULOCYTES # BLD AUTO: 0.03 K/UL (ref 0–0.04)
IMM GRANULOCYTES NFR BLD AUTO: 0.4 % (ref 0–0.5)
INR PPP: 2.9
LACTATE SERPL-SCNC: 1.1 MMOL/L (ref 0.5–1.9)
LYMPHOCYTES # BLD AUTO: 1 K/UL (ref 1–4.8)
LYMPHOCYTES NFR BLD: 12.5 % (ref 18–48)
MCH RBC QN AUTO: 27.8 PG (ref 27–31)
MCHC RBC AUTO-ENTMCNC: 30.9 G/DL (ref 32–36)
MCV RBC AUTO: 90 FL (ref 82–98)
MONOCYTES # BLD AUTO: 0.5 K/UL (ref 0.3–1)
MONOCYTES NFR BLD: 7.1 % (ref 4–15)
NEUTROPHILS # BLD AUTO: 6 K/UL (ref 1.8–7.7)
NEUTROPHILS NFR BLD: 78.2 % (ref 38–73)
NRBC BLD-RTO: 0 /100 WBC
PLATELET # BLD AUTO: 291 K/UL (ref 150–350)
PMV BLD AUTO: 10.6 FL (ref 9.2–12.9)
POTASSIUM SERPL-SCNC: 4.8 MMOL/L (ref 3.5–5.1)
PROT SERPL-MCNC: 7.7 G/DL (ref 6–8.4)
PROTHROMBIN TIME: 29.2 SEC (ref 10.6–14.8)
RBC # BLD AUTO: 3.31 M/UL (ref 4–5.4)
SARS-COV-2 RDRP RESP QL NAA+PROBE: NEGATIVE
SODIUM SERPL-SCNC: 141 MMOL/L (ref 136–145)
TROPONIN I SERPL DL<=0.01 NG/ML-MCNC: 0.03 NG/ML
WBC # BLD AUTO: 7.61 K/UL (ref 3.9–12.7)

## 2020-09-20 PROCEDURE — 85025 COMPLETE CBC W/AUTO DIFF WBC: CPT

## 2020-09-20 PROCEDURE — 85610 PROTHROMBIN TIME: CPT

## 2020-09-20 PROCEDURE — 96375 TX/PRO/DX INJ NEW DRUG ADDON: CPT

## 2020-09-20 PROCEDURE — 27000221 HC OXYGEN, UP TO 24 HOURS

## 2020-09-20 PROCEDURE — 93010 ELECTROCARDIOGRAM REPORT: CPT | Mod: ,,, | Performed by: INTERNAL MEDICINE

## 2020-09-20 PROCEDURE — 25000242 PHARM REV CODE 250 ALT 637 W/ HCPCS: Performed by: EMERGENCY MEDICINE

## 2020-09-20 PROCEDURE — 36415 COLL VENOUS BLD VENIPUNCTURE: CPT

## 2020-09-20 PROCEDURE — 63600175 PHARM REV CODE 636 W HCPCS: Performed by: EMERGENCY MEDICINE

## 2020-09-20 PROCEDURE — 83880 ASSAY OF NATRIURETIC PEPTIDE: CPT

## 2020-09-20 PROCEDURE — U0002 COVID-19 LAB TEST NON-CDC: HCPCS

## 2020-09-20 PROCEDURE — 80053 COMPREHEN METABOLIC PANEL: CPT

## 2020-09-20 PROCEDURE — 93010 EKG 12-LEAD: ICD-10-PCS | Mod: ,,, | Performed by: INTERNAL MEDICINE

## 2020-09-20 PROCEDURE — 99900035 HC TECH TIME PER 15 MIN (STAT)

## 2020-09-20 PROCEDURE — 96374 THER/PROPH/DIAG INJ IV PUSH: CPT

## 2020-09-20 PROCEDURE — 87040 BLOOD CULTURE FOR BACTERIA: CPT

## 2020-09-20 PROCEDURE — 94640 AIRWAY INHALATION TREATMENT: CPT

## 2020-09-20 PROCEDURE — 93005 ELECTROCARDIOGRAM TRACING: CPT | Performed by: INTERNAL MEDICINE

## 2020-09-20 PROCEDURE — 20000000 HC ICU ROOM

## 2020-09-20 PROCEDURE — 99291 CRITICAL CARE FIRST HOUR: CPT

## 2020-09-20 PROCEDURE — 99900031 HC PATIENT EDUCATION (STAT)

## 2020-09-20 PROCEDURE — 94761 N-INVAS EAR/PLS OXIMETRY MLT: CPT

## 2020-09-20 PROCEDURE — 83605 ASSAY OF LACTIC ACID: CPT

## 2020-09-20 PROCEDURE — 84484 ASSAY OF TROPONIN QUANT: CPT

## 2020-09-20 RX ORDER — IPRATROPIUM BROMIDE AND ALBUTEROL SULFATE 2.5; .5 MG/3ML; MG/3ML
3 SOLUTION RESPIRATORY (INHALATION)
Status: COMPLETED | OUTPATIENT
Start: 2020-09-20 | End: 2020-09-20

## 2020-09-20 RX ORDER — CEFEPIME HYDROCHLORIDE 1 G/50ML
2 INJECTION, SOLUTION INTRAVENOUS
Status: DISCONTINUED | OUTPATIENT
Start: 2020-09-20 | End: 2020-09-23

## 2020-09-20 RX ORDER — NITROGLYCERIN 0.4 MG/1
0.4 TABLET SUBLINGUAL
Status: COMPLETED | OUTPATIENT
Start: 2020-09-20 | End: 2020-09-20

## 2020-09-20 RX ORDER — LORAZEPAM 2 MG/ML
0.5 INJECTION INTRAMUSCULAR
Status: COMPLETED | OUTPATIENT
Start: 2020-09-20 | End: 2020-09-20

## 2020-09-20 RX ORDER — METHYLPREDNISOLONE SOD SUCC 125 MG
125 VIAL (EA) INJECTION
Status: COMPLETED | OUTPATIENT
Start: 2020-09-20 | End: 2020-09-20

## 2020-09-20 RX ADMIN — LORAZEPAM 0.5 MG: 2 INJECTION INTRAMUSCULAR; INTRAVENOUS at 11:09

## 2020-09-20 RX ADMIN — IPRATROPIUM BROMIDE AND ALBUTEROL SULFATE 3 ML: .5; 3 SOLUTION RESPIRATORY (INHALATION) at 09:09

## 2020-09-20 RX ADMIN — CEFEPIME HYDROCHLORIDE 2 G: 2 INJECTION, SOLUTION INTRAVENOUS at 10:09

## 2020-09-20 RX ADMIN — METHYLPREDNISOLONE SODIUM SUCCINATE 125 MG: 125 INJECTION, POWDER, FOR SOLUTION INTRAMUSCULAR; INTRAVENOUS at 10:09

## 2020-09-20 RX ADMIN — NITROGLYCERIN 0.4 MG: 0.4 TABLET SUBLINGUAL at 09:09

## 2020-09-21 LAB
ALBUMIN SERPL BCP-MCNC: 3.4 G/DL (ref 3.5–5.2)
ALLENS TEST: ABNORMAL
ALP SERPL-CCNC: 141 U/L (ref 55–135)
ALT SERPL W/O P-5'-P-CCNC: 24 U/L (ref 10–44)
ANION GAP SERPL CALC-SCNC: 20 MMOL/L (ref 8–16)
AST SERPL-CCNC: 43 U/L (ref 10–40)
BASOPHILS # BLD AUTO: 0.02 K/UL (ref 0–0.2)
BASOPHILS NFR BLD: 0.2 % (ref 0–1.9)
BILIRUB SERPL-MCNC: 0.9 MG/DL (ref 0.1–1)
BNP SERPL-MCNC: >4500 PG/ML (ref 0–99)
BUN SERPL-MCNC: 92 MG/DL (ref 8–23)
CALCIUM SERPL-MCNC: 8.8 MG/DL (ref 8.7–10.5)
CHLORIDE SERPL-SCNC: 95 MMOL/L (ref 95–110)
CO2 SERPL-SCNC: 25 MMOL/L (ref 23–29)
CREAT SERPL-MCNC: 6.8 MG/DL (ref 0.5–1.4)
DELSYS: ABNORMAL
DIFFERENTIAL METHOD: ABNORMAL
EOSINOPHIL # BLD AUTO: 0 K/UL (ref 0–0.5)
EOSINOPHIL NFR BLD: 0 % (ref 0–8)
EP: 8
ERYTHROCYTE [DISTWIDTH] IN BLOOD BY AUTOMATED COUNT: 17.4 % (ref 11.5–14.5)
EST. GFR  (AFRICAN AMERICAN): 6.3 ML/MIN/1.73 M^2
EST. GFR  (NON AFRICAN AMERICAN): 5.5 ML/MIN/1.73 M^2
FIO2: 30
GLUCOSE SERPL-MCNC: 158 MG/DL (ref 70–110)
HCO3 UR-SCNC: 29.1 MMOL/L (ref 24–28)
HCT VFR BLD AUTO: 31.5 % (ref 37–48.5)
HGB BLD-MCNC: 9.6 G/DL (ref 12–16)
IMM GRANULOCYTES # BLD AUTO: 0.03 K/UL (ref 0–0.04)
IMM GRANULOCYTES NFR BLD AUTO: 0.4 % (ref 0–0.5)
INR PPP: 3.2
IP: 14
LYMPHOCYTES # BLD AUTO: 0.4 K/UL (ref 1–4.8)
LYMPHOCYTES NFR BLD: 4.3 % (ref 18–48)
MAGNESIUM SERPL-MCNC: 1.9 MG/DL (ref 1.6–2.6)
MCH RBC QN AUTO: 27.5 PG (ref 27–31)
MCHC RBC AUTO-ENTMCNC: 30.5 G/DL (ref 32–36)
MCV RBC AUTO: 90 FL (ref 82–98)
MODE: ABNORMAL
MONOCYTES # BLD AUTO: 0.1 K/UL (ref 0.3–1)
MONOCYTES NFR BLD: 0.7 % (ref 4–15)
NEUTROPHILS # BLD AUTO: 7.9 K/UL (ref 1.8–7.7)
NEUTROPHILS NFR BLD: 94.4 % (ref 38–73)
NRBC BLD-RTO: 0 /100 WBC
PCO2 BLDA: 47.1 MMHG (ref 35–45)
PH SMN: 7.4 [PH] (ref 7.35–7.45)
PHOSPHATE SERPL-MCNC: 2.4 MG/DL (ref 2.7–4.5)
PLATELET # BLD AUTO: 307 K/UL (ref 150–350)
PMV BLD AUTO: 10.9 FL (ref 9.2–12.9)
PO2 BLDA: 44 MMHG (ref 40–60)
POC BE: 4 MMOL/L
POC SATURATED O2: 79 % (ref 95–100)
POC TCO2: 30 MMOL/L (ref 24–29)
POTASSIUM SERPL-SCNC: 5.2 MMOL/L (ref 3.5–5.1)
PROT SERPL-MCNC: 8.2 G/DL (ref 6–8.4)
PROTHROMBIN TIME: 31.7 SEC (ref 10.6–14.8)
RBC # BLD AUTO: 3.49 M/UL (ref 4–5.4)
SAMPLE: ABNORMAL
SITE: ABNORMAL
SODIUM SERPL-SCNC: 140 MMOL/L (ref 136–145)
SP02: 97
TROPONIN I SERPL DL<=0.01 NG/ML-MCNC: 0.03 NG/ML
TSH SERPL DL<=0.005 MIU/L-ACNC: 3.21 UIU/ML (ref 0.34–5.6)
WBC # BLD AUTO: 8.41 K/UL (ref 3.9–12.7)

## 2020-09-21 PROCEDURE — 99900035 HC TECH TIME PER 15 MIN (STAT)

## 2020-09-21 PROCEDURE — 93005 ELECTROCARDIOGRAM TRACING: CPT | Performed by: INTERNAL MEDICINE

## 2020-09-21 PROCEDURE — 80053 COMPREHEN METABOLIC PANEL: CPT

## 2020-09-21 PROCEDURE — 63600175 PHARM REV CODE 636 W HCPCS: Performed by: NURSE PRACTITIONER

## 2020-09-21 PROCEDURE — 25000003 PHARM REV CODE 250

## 2020-09-21 PROCEDURE — 84100 ASSAY OF PHOSPHORUS: CPT

## 2020-09-21 PROCEDURE — 84484 ASSAY OF TROPONIN QUANT: CPT

## 2020-09-21 PROCEDURE — 63600175 PHARM REV CODE 636 W HCPCS: Performed by: EMERGENCY MEDICINE

## 2020-09-21 PROCEDURE — 90935 HEMODIALYSIS ONE EVALUATION: CPT

## 2020-09-21 PROCEDURE — 94660 CPAP INITIATION&MGMT: CPT

## 2020-09-21 PROCEDURE — 85025 COMPLETE CBC W/AUTO DIFF WBC: CPT

## 2020-09-21 PROCEDURE — 82803 BLOOD GASES ANY COMBINATION: CPT

## 2020-09-21 PROCEDURE — 83880 ASSAY OF NATRIURETIC PEPTIDE: CPT

## 2020-09-21 PROCEDURE — 84443 ASSAY THYROID STIM HORMONE: CPT

## 2020-09-21 PROCEDURE — 25000242 PHARM REV CODE 250 ALT 637 W/ HCPCS: Performed by: NURSE PRACTITIONER

## 2020-09-21 PROCEDURE — 94761 N-INVAS EAR/PLS OXIMETRY MLT: CPT

## 2020-09-21 PROCEDURE — 25000003 PHARM REV CODE 250: Performed by: NURSE PRACTITIONER

## 2020-09-21 PROCEDURE — 20000000 HC ICU ROOM

## 2020-09-21 PROCEDURE — 85610 PROTHROMBIN TIME: CPT

## 2020-09-21 PROCEDURE — 99900031 HC PATIENT EDUCATION (STAT)

## 2020-09-21 PROCEDURE — 94640 AIRWAY INHALATION TREATMENT: CPT

## 2020-09-21 PROCEDURE — 99223 PR INITIAL HOSPITAL CARE,LEVL III: ICD-10-PCS | Mod: ,,, | Performed by: INTERNAL MEDICINE

## 2020-09-21 PROCEDURE — 93010 ELECTROCARDIOGRAM REPORT: CPT | Mod: ,,, | Performed by: INTERNAL MEDICINE

## 2020-09-21 PROCEDURE — 83735 ASSAY OF MAGNESIUM: CPT

## 2020-09-21 PROCEDURE — 36600 WITHDRAWAL OF ARTERIAL BLOOD: CPT

## 2020-09-21 PROCEDURE — 99223 1ST HOSP IP/OBS HIGH 75: CPT | Mod: ,,, | Performed by: INTERNAL MEDICINE

## 2020-09-21 PROCEDURE — 27000221 HC OXYGEN, UP TO 24 HOURS

## 2020-09-21 PROCEDURE — 93010 EKG 12-LEAD: ICD-10-PCS | Mod: ,,, | Performed by: INTERNAL MEDICINE

## 2020-09-21 PROCEDURE — 36415 COLL VENOUS BLD VENIPUNCTURE: CPT

## 2020-09-21 RX ORDER — LORAZEPAM 2 MG/ML
1 INJECTION INTRAMUSCULAR ONCE
Status: COMPLETED | OUTPATIENT
Start: 2020-09-21 | End: 2020-09-21

## 2020-09-21 RX ORDER — ZINC SULFATE 50(220)MG
220 CAPSULE ORAL DAILY
Status: DISCONTINUED | OUTPATIENT
Start: 2020-09-21 | End: 2020-09-23 | Stop reason: HOSPADM

## 2020-09-21 RX ORDER — LOSARTAN POTASSIUM 25 MG/1
25 TABLET ORAL DAILY
Status: DISCONTINUED | OUTPATIENT
Start: 2020-09-21 | End: 2020-09-21

## 2020-09-21 RX ORDER — CHLORHEXIDINE GLUCONATE ORAL RINSE 1.2 MG/ML
15 SOLUTION DENTAL 2 TIMES DAILY
Status: DISCONTINUED | OUTPATIENT
Start: 2020-09-21 | End: 2020-09-23 | Stop reason: HOSPADM

## 2020-09-21 RX ORDER — LORAZEPAM 2 MG/ML
1 INJECTION INTRAMUSCULAR EVERY 4 HOURS PRN
Status: DISCONTINUED | OUTPATIENT
Start: 2020-09-21 | End: 2020-09-23 | Stop reason: HOSPADM

## 2020-09-21 RX ORDER — LEVALBUTEROL INHALATION SOLUTION 0.63 MG/3ML
0.63 SOLUTION RESPIRATORY (INHALATION) EVERY 6 HOURS
Status: DISCONTINUED | OUTPATIENT
Start: 2020-09-21 | End: 2020-09-23

## 2020-09-21 RX ORDER — DILTIAZEM HCL 1 MG/ML
5 INJECTION, SOLUTION INTRAVENOUS CONTINUOUS
Status: DISCONTINUED | OUTPATIENT
Start: 2020-09-21 | End: 2020-09-22

## 2020-09-21 RX ORDER — LANOLIN ALCOHOL/MO/W.PET/CERES
400 CREAM (GRAM) TOPICAL DAILY
Status: DISCONTINUED | OUTPATIENT
Start: 2020-09-21 | End: 2020-09-23 | Stop reason: HOSPADM

## 2020-09-21 RX ORDER — DILTIAZEM HYDROCHLORIDE 180 MG/1
180 CAPSULE, COATED, EXTENDED RELEASE ORAL DAILY
Status: DISCONTINUED | OUTPATIENT
Start: 2020-09-21 | End: 2020-09-23 | Stop reason: HOSPADM

## 2020-09-21 RX ORDER — SODIUM CHLORIDE 0.9 % (FLUSH) 0.9 %
10 SYRINGE (ML) INJECTION
Status: DISCONTINUED | OUTPATIENT
Start: 2020-09-21 | End: 2020-09-23 | Stop reason: HOSPADM

## 2020-09-21 RX ORDER — METOPROLOL SUCCINATE 25 MG/1
25 TABLET, EXTENDED RELEASE ORAL NIGHTLY
Status: DISCONTINUED | OUTPATIENT
Start: 2020-09-21 | End: 2020-09-23 | Stop reason: HOSPADM

## 2020-09-21 RX ORDER — MUPIROCIN 20 MG/G
OINTMENT TOPICAL 2 TIMES DAILY
Status: DISCONTINUED | OUTPATIENT
Start: 2020-09-21 | End: 2020-09-23 | Stop reason: HOSPADM

## 2020-09-21 RX ORDER — ISOSORBIDE MONONITRATE 30 MG/1
30 TABLET, EXTENDED RELEASE ORAL DAILY
Status: DISCONTINUED | OUTPATIENT
Start: 2020-09-21 | End: 2020-09-23 | Stop reason: HOSPADM

## 2020-09-21 RX ORDER — TRAMADOL HYDROCHLORIDE 50 MG/1
50 TABLET ORAL EVERY 8 HOURS PRN
Status: DISCONTINUED | OUTPATIENT
Start: 2020-09-21 | End: 2020-09-23 | Stop reason: HOSPADM

## 2020-09-21 RX ORDER — WARFARIN 2.5 MG/1
5 TABLET ORAL DAILY
Status: DISCONTINUED | OUTPATIENT
Start: 2020-09-21 | End: 2020-09-21

## 2020-09-21 RX ORDER — SODIUM CHLORIDE 9 MG/ML
INJECTION, SOLUTION INTRAVENOUS
Status: DISCONTINUED | OUTPATIENT
Start: 2020-09-21 | End: 2020-09-23 | Stop reason: HOSPADM

## 2020-09-21 RX ORDER — CALCIUM ACETATE 667 MG/1
667 CAPSULE ORAL DAILY
Status: DISCONTINUED | OUTPATIENT
Start: 2020-09-21 | End: 2020-09-23 | Stop reason: HOSPADM

## 2020-09-21 RX ORDER — ASPIRIN 81 MG/1
81 TABLET ORAL DAILY
Status: DISCONTINUED | OUTPATIENT
Start: 2020-09-21 | End: 2020-09-23 | Stop reason: HOSPADM

## 2020-09-21 RX ORDER — IPRATROPIUM BROMIDE 0.5 MG/2.5ML
0.5 SOLUTION RESPIRATORY (INHALATION) EVERY 6 HOURS
Status: DISCONTINUED | OUTPATIENT
Start: 2020-09-21 | End: 2020-09-23 | Stop reason: HOSPADM

## 2020-09-21 RX ORDER — SODIUM CHLORIDE 9 MG/ML
INJECTION, SOLUTION INTRAVENOUS ONCE
Status: DISCONTINUED | OUTPATIENT
Start: 2020-09-21 | End: 2020-09-23 | Stop reason: HOSPADM

## 2020-09-21 RX ORDER — PANTOPRAZOLE SODIUM 40 MG/1
40 TABLET, DELAYED RELEASE ORAL
Status: DISCONTINUED | OUTPATIENT
Start: 2020-09-21 | End: 2020-09-23 | Stop reason: HOSPADM

## 2020-09-21 RX ADMIN — LEVALBUTEROL HYDROCHLORIDE 0.63 MG: 0.63 SOLUTION RESPIRATORY (INHALATION) at 01:09

## 2020-09-21 RX ADMIN — TRAMADOL HYDROCHLORIDE 50 MG: 50 TABLET, FILM COATED ORAL at 08:09

## 2020-09-21 RX ADMIN — CEFEPIME HYDROCHLORIDE 2 G: 2 INJECTION, SOLUTION INTRAVENOUS at 11:09

## 2020-09-21 RX ADMIN — LORAZEPAM 1 MG: 2 INJECTION INTRAMUSCULAR; INTRAVENOUS at 04:09

## 2020-09-21 RX ADMIN — METOPROLOL SUCCINATE 25 MG: 25 TABLET, FILM COATED, EXTENDED RELEASE ORAL at 08:09

## 2020-09-21 RX ADMIN — DILTIAZEM HYDROCHLORIDE 10 MG/HR: 5 INJECTION INTRAVENOUS at 10:09

## 2020-09-21 RX ADMIN — LORAZEPAM 1 MG: 2 INJECTION INTRAMUSCULAR; INTRAVENOUS at 01:09

## 2020-09-21 RX ADMIN — LEVALBUTEROL HYDROCHLORIDE 0.63 MG: 0.63 SOLUTION RESPIRATORY (INHALATION) at 08:09

## 2020-09-21 RX ADMIN — CHLORHEXIDINE GLUCONATE 15 ML: 1.2 RINSE ORAL at 10:09

## 2020-09-21 RX ADMIN — IPRATROPIUM BROMIDE 0.5 MG: 0.5 SOLUTION RESPIRATORY (INHALATION) at 01:09

## 2020-09-21 RX ADMIN — IPRATROPIUM BROMIDE 0.5 MG: 0.5 SOLUTION RESPIRATORY (INHALATION) at 08:09

## 2020-09-21 RX ADMIN — CEFEPIME HYDROCHLORIDE 2 G: 2 INJECTION, SOLUTION INTRAVENOUS at 03:09

## 2020-09-21 RX ADMIN — DILTIAZEM HYDROCHLORIDE 5 MG/HR: 5 INJECTION INTRAVENOUS at 03:09

## 2020-09-21 RX ADMIN — MUPIROCIN: 20 OINTMENT TOPICAL at 10:09

## 2020-09-21 NOTE — ED PROVIDER NOTES
Encounter Date: 9/20/2020       History     Chief Complaint   Patient presents with    Shortness of Breath     onset today with worsening of SOB- reports hx of COPD. Home O2in use @ 4L/NC during triage     This is a 75-year-old female with past medical history of COPD on oxygen at home, end-stage renal disease on hemodialysis, congestive heart failure, atrial fibrillation, calciphylaxis, who presents emergency department with shortness of breath and a cough.  She says that she has had shortness of breath for the last several days.  She has had a cough that is clear sputum.  No fever or chills reported.  She denies any chest pain or tightness.  Not recently been exposed anyone with coronavirus.  She says she normally has swelling in her legs unchanged from baseline.  She is currently being treated for chronic wound on her left upper thigh which she says is well healing.  She went to her last dialysis on Friday.  She does not make any urine.        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    both legs    CHF (congestive heart failure)     Encounter for blood transfusion 03/2016    Gout     Hemodialysis access, AV graft     Hypertension     Mitral valve regurgitation     Osteoarthritis     Pancreatitis     Peripheral vascular disease     Pneumonia 09/09/2017    Renal failure      Past Surgical History:   Procedure Laterality Date    ACHILLES TENDON SURGERY Right     ANGIOGRAPHY OF ARTERIOVENOUS SHUNT Right 1/7/2020    Procedure: Fistulogram with Possible Intervention;  Surgeon: Joseph Loyola MD;  Location: [facility] CATH/EP LAB;  Service: Cardiology;  Laterality: Right;    ANGIOGRAPHY OF LOWER EXTREMITY Left 3/18/2020    Procedure: Angiogram Extremity Unilateral;  Surgeon: Ali Khoobehi, MD;  Location: [facility] CATH/EP LAB;  Service: Cardiology;  Laterality: Left;    APPENDECTOMY       CARDIAC CATHETERIZATION  07/03/2017    CHOLECYSTECTOMY      ESOPHAGOGASTRODUODENOSCOPY Left 8/17/2020    Procedure: EGD (ESOPHAGOGASTRODUODENOSCOPY);  Surgeon: Talat Trevizo MD;  Location: [facility] ENDO;  Service: Endoscopy;  Laterality: Left;    heal surgery Right     HYSTERECTOMY      INSERTION OF STENT INTO PERIPHERAL VESSEL N/A 1/7/2020    Procedure: INSERTION, STENT, VESSEL, PERIPHERAL;  Surgeon: Joseph Loyola MD;  Location: [facility] CATH/EP LAB;  Service: Cardiology;  Laterality: N/A;    MITRAL VALVE REPLACEMENT      PARATHYROIDECTOMY      PARATHYROIDECTOMY  07/13/2017    PERCUTANEOUS TRANSLUMINAL ANGIOPLASTY OF ARTERIOVENOUS FISTULA N/A 1/7/2020    Procedure: PTA, AV FISTULA;  Surgeon: Joseph Loyola MD;  Location: [facility] CATH/EP LAB;  Service: Cardiology;  Laterality: N/A;    PERITONEAL CATHETER INSERTION      RENAL BIOPSY      vocal cord nodule      WOUND DEBRIDEMENT Left 7/13/2020    Procedure: DEBRIDEMENT, WOUND;  Surgeon: Carlos Elam III, MD;  Location: [facility] OR;  Service: General;  Laterality: Left;     Family History   Problem Relation Age of Onset    Heart disease Sister     Early death Sister         heart as baby    Heart disease Maternal Grandfather     Diabetes Mother     Hypertension Mother     Heart failure Mother     Heart disease Mother     Arthritis Mother     Diabetes Father     Early death Sister         infant     Social History     Tobacco Use    Smoking status: Current Some Day Smoker     Packs/day: 0.50     Years: 45.00     Pack years: 22.50     Types: Cigarettes    Smokeless tobacco: Never Used   Substance Use Topics    Alcohol use: No    Drug use: No     Review of Systems   Constitutional: Negative for chills and fever.   HENT: Negative for sore throat.    Respiratory: Positive for cough and shortness of breath.    Cardiovascular: Positive for leg swelling. Negative for chest pain and palpitations.   Gastrointestinal: Negative for abdominal pain, nausea  and vomiting.   Genitourinary: Negative for dysuria.   Musculoskeletal: Negative for back pain.   Skin: Positive for wound. Negative for rash.   Neurological: Negative for weakness and headaches.   Hematological: Does not bruise/bleed easily.   All other systems reviewed and are negative.      Physical Exam     Initial Vitals [09/20/20 2044]   BP Pulse Resp Temp SpO2   (!) 184/81 101 20 97.9 °F (36.6 °C) 99 %      MAP       --         Physical Exam    Nursing note and vitals reviewed.  Constitutional: She appears well-developed and well-nourished. No distress.   HENT:   Head: Normocephalic and atraumatic.   Mouth/Throat: No oropharyngeal exudate.   Eyes: Conjunctivae and EOM are normal. Pupils are equal, round, and reactive to light.   Neck: Neck supple. No tracheal deviation present.   Cardiovascular: Normal heart sounds and intact distal pulses.   No murmur heard.  Tachycardic, irregular regular   Pulmonary/Chest: No stridor. No respiratory distress. She has no wheezes. She has rhonchi (Present in the right upper and right lower lobe l.). She has no rales.   Left lung sounds clear   Abdominal: Soft. She exhibits no distension. There is no abdominal tenderness. There is no rebound and no guarding.   Musculoskeletal: Normal range of motion. Edema (1+ pitting pretibial feet bilaterally.) present. No tenderness.      Comments: Right arm:  There is a dialysis fistula present with a palpable thrill and bruit.    Left leg:  There is a chronic appearing wound to the left medial distal thigh region without any surrounding erythema or induration or any drainage.  It is bandaged.   Neurological: She is alert and oriented to person, place, and time. She has normal strength. No cranial nerve deficit or sensory deficit.   Skin: Skin is warm and dry. Capillary refill takes less than 2 seconds. No rash noted. No erythema. No pallor.   Psychiatric: She has a normal mood and affect. Her behavior is normal. Thought content normal.          ED Course   Procedures  Labs Reviewed   CBC W/ AUTO DIFFERENTIAL - Abnormal; Notable for the following components:       Result Value    RBC 3.31 (*)     Hemoglobin 9.2 (*)     Hematocrit 29.8 (*)     Mean Corpuscular Hemoglobin Conc 30.9 (*)     RDW 17.3 (*)     Gran% 78.2 (*)     Lymph% 12.5 (*)     All other components within normal limits   COMPREHENSIVE METABOLIC PANEL - Abnormal; Notable for the following components:    Chloride 94 (*)     BUN, Bld 84 (*)     Creatinine 6.7 (*)     Calcium 8.6 (*)     Albumin 3.3 (*)     Anion Gap 19 (*)     eGFR if  6.4 (*)     eGFR if non  5.6 (*)     All other components within normal limits   B-TYPE NATRIURETIC PEPTIDE - Abnormal; Notable for the following components:    BNP >4,500 (*)     All other components within normal limits   PROTIME-INR - Abnormal; Notable for the following components:    PT 29.2 (*)     All other components within normal limits   ISTAT PROCEDURE - Abnormal; Notable for the following components:    POC PCO2 47.1 (*)     POC HCO3 29.1 (*)     POC SATURATED O2 79 (*)     POC TCO2 30 (*)     All other components within normal limits   CULTURE, BLOOD   CULTURE, BLOOD   TROPONIN I   SARS-COV-2 RNA AMPLIFICATION, QUAL   LACTIC ACID, PLASMA           Results for orders placed or performed during the hospital encounter of 09/20/20   CBC auto differential   Result Value Ref Range    WBC 7.61 3.90 - 12.70 K/uL    RBC 3.31 (L) 4.00 - 5.40 M/uL    Hemoglobin 9.2 (L) 12.0 - 16.0 g/dL    Hematocrit 29.8 (L) 37.0 - 48.5 %    Mean Corpuscular Volume 90 82 - 98 fL    Mean Corpuscular Hemoglobin 27.8 27.0 - 31.0 pg    Mean Corpuscular Hemoglobin Conc 30.9 (L) 32.0 - 36.0 g/dL    RDW 17.3 (H) 11.5 - 14.5 %    Platelets 291 150 - 350 K/uL    MPV 10.6 9.2 - 12.9 fL    Immature Granulocytes 0.4 0.0 - 0.5 %    Gran # (ANC) 6.0 1.8 - 7.7 K/uL    Immature Grans (Abs) 0.03 0.00 - 0.04 K/uL    Lymph # 1.0 1.0 - 4.8 K/uL    Mono # 0.5  0.3 - 1.0 K/uL    Eos # 0.1 0.0 - 0.5 K/uL    Baso # 0.03 0.00 - 0.20 K/uL    nRBC 0 0 /100 WBC    Gran% 78.2 (H) 38.0 - 73.0 %    Lymph% 12.5 (L) 18.0 - 48.0 %    Mono% 7.1 4.0 - 15.0 %    Eosinophil% 1.4 0.0 - 8.0 %    Basophil% 0.4 0.0 - 1.9 %    Differential Method Automated    Comprehensive metabolic panel   Result Value Ref Range    Sodium 141 136 - 145 mmol/L    Potassium 4.8 3.5 - 5.1 mmol/L    Chloride 94 (L) 95 - 110 mmol/L    CO2 28 23 - 29 mmol/L    Glucose 94 70 - 110 mg/dL    BUN, Bld 84 (H) 8 - 23 mg/dL    Creatinine 6.7 (H) 0.5 - 1.4 mg/dL    Calcium 8.6 (L) 8.7 - 10.5 mg/dL    Total Protein 7.7 6.0 - 8.4 g/dL    Albumin 3.3 (L) 3.5 - 5.2 g/dL    Total Bilirubin 0.7 0.1 - 1.0 mg/dL    Alkaline Phosphatase 128 55 - 135 U/L    AST 36 10 - 40 U/L    ALT 19 10 - 44 U/L    Anion Gap 19 (H) 8 - 16 mmol/L    eGFR if African American 6.4 (A) >60 mL/min/1.73 m^2    eGFR if non African American 5.6 (A) >60 mL/min/1.73 m^2   Brain natriuretic peptide   Result Value Ref Range    BNP >4,500 (H) 0 - 99 pg/mL   Troponin I   Result Value Ref Range    Troponin I 0.033 <=0.040 ng/mL   Protime-INR   Result Value Ref Range    PT 29.2 (H) 10.6 - 14.8 sec    INR 2.9    COVID-19 Rapid Screening   Result Value Ref Range    SARS-CoV-2 RNA, Amplification, Qual Negative Negative   Lactic acid, plasma   Result Value Ref Range    Lactate (Lactic Acid) 1.1 0.5 - 1.9 mmol/L   ISTAT PROCEDURE   Result Value Ref Range    POC PH 7.398 7.35 - 7.45    POC PCO2 47.1 (H) 35 - 45 mmHg    POC PO2 44 40 - 60 mmHg    POC HCO3 29.1 (H) 24 - 28 mmol/L    POC BE 4 -2 to 2 mmol/L    POC SATURATED O2 79 (L) 95 - 100 %    POC TCO2 30 (H) 24 - 29 mmol/L    Sample VENOUS     Site LR     Allens Test N/A     DelSys CPAP/BiPAP     Mode BiPAP     FiO2 30     Sp02 97     IP 14     EP 8      X-Ray Chest AP Portable   ED Interpretation   Abnormal x-ray with right pleural effusion, COPD changes infiltrate or atelectasis in lower lobes bilaterally.           Imaging Results          X-Ray Chest AP Portable (Preliminary result)  Result time 09/20/20 21:28:33   Procedure changed from X-Ray Chest 1 View     ED Interpretation by Zane Morales DO (09/20/20 21:28:33, [facility], Emergency Medicine)    Abnormal x-ray with right pleural effusion, COPD changes infiltrate or atelectasis in lower lobes bilaterally.                               Medical Decision Making:   ED Management:  Patient presents emergency department shortness of breath.  Is multifactorial in the setting of end-stage renal disease with send questionable mild volume overload, COPD exacerbation with cough sputum and may have superimposed pneumonia.  Patient treated breathing treatments steroids antibiotics and oxygen is stable.  Patient requested multiple times to be placed on BiPAP.  She stated she felt better after being placed on BiPAP although I did not believe her respiratory symptoms were significant enough to require this but she did say she felt better on BiPAP.  Blood gas was obtained is relatively within normal limits.  Patient is requesting to be put on dialysis.  At this point she is not in any respiratory distress she is not any more hypoxemic than which she is at her baseline..  I did place a consultation to Nephrology per her request but do not think that she needs emergent dialysis at this point.  She will be admitted to the hospitalist for further care and evaluation of her symptoms.              Attending Attestation:         Attending Critical Care:   Critical Care Times:   Direct Patient Care (initial evaluation, reassessments, and time considering the case)................................................................15 minutes.   Additional History from reviewing old medical records or taking additional history from the family, EMS, PCP, etc.......................5 minutes.   Ordering, Reviewing, and Interpreting Diagnostic  Studies...............................................................................................................5 minutes.   Documentation..................................................................................................................................................................................10 minutes.   Consultation with other Physicians. .................................................................................................................................................5 minutes.   Consultation with the patient's family directly relating to the patient's condition, care, and DNR status (when patient unable)......5 minutes.   ==============================================================  · Total Critical Care Time - exclusive of procedural time: 45 minutes.  ==============================================================  Critical care was necessary to treat or prevent imminent or life-threatening deterioration of the following conditions: cardiac arrhythmia and COPD exacerbation.   The following critical care procedures were done by me (see procedure notes): blood draw for specimens and pulse oximetry.   Critical care was time spent personally by me on the following activities: obtaining history from patient or relative, review of x-rays / CT sent with the patient, review of old charts, ordering lab, x-rays, and/or EKG, development of treatment plan with patient or relative, evaluation of patient's response to treatment, discussions with primary provider, interpretation of cardiac measurements and re-evaluation of patient's conition.   Critical Care Condition: potentially life-threatening               ED Course as of Sep 21 0246   Sun Sep 20, 2020   2121 EKG 9:02 P.M. ATRIAL FIBRILLATION RATE , ARTIFACT PRESENT WHICH LIMITS EKG INTERPRETATION.  LEFT AXIS DEVIATION.  T-WAVES INVERTED LATERALLY.  NO STEMI.  EKG UNCHANGED FROM PRIOR.    [JR]   2440 Re-evaluated the  patient she is on BiPAP, somewhat drowsy, will check a blood gas .    [JR]   7522 Regina will admit the patient to the hospital    [JR]      ED Course User Index  [JR] Zane Morales DO            Clinical Impression:       ICD-10-CM ICD-9-CM   1. COPD with acute exacerbation  J44.1 491.21   2. Dyspnea  R06.00 786.09   3. ESRD (end stage renal disease) on dialysis  N18.6 585.6    Z99.2 V45.11   4. Acute on chronic respiratory failure  J96.20 518.84   5. Chest pain  R07.9 786.50                          ED Disposition Condition    Admit                             Zane Morales DO  09/21/20 0251

## 2020-09-21 NOTE — H&P
[facility] Medicine History & Physical Examination   Patient Name: Griselda Neely  MRN: [id number]  Patient Class: Emergency   Admission Date: 9/20/2020  8:49 PM  Length of Stay: 0  Attending Physician:   Primary Care Provider: Kalie Neely MD  Face-to-Face encounter date: 09/20/2020  Code Status:MARCEL FARIA:  Chief Complaint: Shortness of Breath (onset today with worsening of SOB- reports hx of COPD. Home O2in use @ 4L/NC during triage)        Patient information was obtained from patient, past medical records and ER records.   HISTORY OF PRESENT ILLNESS:   Griselda Neely is a 75 y.o. 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 The patient presented to [facility] on 9/20/2020 with a primary complaint of Shortness of Breath (onset today with worsening of SOB- reports hx of COPD. Home O2in use @ 4L/NC during triage)  .   75 year old  female with a known history end-stage renal disease requiring hemodialysis 3 times a week, hypertension, atrial fibrillation, atrial thrombus, calciphylaxis, hyperlipidemia presents to the emergency room with complaints of shortness of breath.      The patient also states she a history of COPD is on oxygen at 4 L a minute at home.      The patient presents with complaints of worsening shortness of breath over the last few days.  She endorses a productive cough that is clear in color and thick in consistency.  She denies fever, chills, hematemesis or hemoptysis.      She states that she has been compliant with her hemodialysis session and her last hemodialysis was on Friday.  She normally dialyzed on Monday Wednesdays  and Fridays.  She dialyzed for her full time.    She is currently being treated for left upper thigh wound that is healing well.      REVIEW OF SYSTEMS:   10 Point Review of System was performed and was found to be negative except for that mentioned already in the HPI and   Review of Systems (Negative unless checked off)  Review of Systems   Constitutional: Positive for malaise/fatigue.   HENT: Negative.    Eyes: Negative.    Respiratory: Positive for cough, sputum production and shortness of breath.    Cardiovascular: Positive for leg swelling.   Gastrointestinal: Negative.    Genitourinary: Negative.    Musculoskeletal: Positive for back pain.   Skin: Negative.         Skin discoloration to bilateral lower legs + calciphylaxis   Neurological: Positive for weakness.   Psychiatric/Behavioral: The patient is nervous/anxious.            PAST MEDICAL HISTORY:     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    both legs    CHF (congestive heart failure)     Encounter for blood transfusion 03/2016    Gout     Hemodialysis access, AV graft     Hypertension     Mitral valve regurgitation     Osteoarthritis     Pancreatitis     Peripheral vascular disease     Pneumonia 09/09/2017    Renal failure        PAST SURGICAL HISTORY:     Past Surgical History:   Procedure Laterality Date    ACHILLES TENDON SURGERY Right     ANGIOGRAPHY OF ARTERIOVENOUS SHUNT Right 1/7/2020    Procedure: Fistulogram with Possible Intervention;  Surgeon: Joseph Loyola MD;  Location: [facility] CATH/EP LAB;  Service: Cardiology;  Laterality: Right;    ANGIOGRAPHY OF LOWER EXTREMITY Left 3/18/2020    Procedure: Angiogram Extremity Unilateral;  Surgeon: Ali Khoobehi, MD;  Location: [facility] CATH/EP LAB;  Service: Cardiology;  Laterality: Left;    APPENDECTOMY      CARDIAC CATHETERIZATION  07/03/2017    CHOLECYSTECTOMY      ESOPHAGOGASTRODUODENOSCOPY Left 8/17/2020    Procedure: EGD  (ESOPHAGOGASTRODUODENOSCOPY);  Surgeon: Talat Trevizo MD;  Location: [facility] ENDO;  Service: Endoscopy;  Laterality: Left;    heal surgery Right     HYSTERECTOMY      INSERTION OF STENT INTO PERIPHERAL VESSEL N/A 1/7/2020    Procedure: INSERTION, STENT, VESSEL, PERIPHERAL;  Surgeon: Joseph Loyola MD;  Location: [facility] CATH/EP LAB;  Service: Cardiology;  Laterality: N/A;    MITRAL VALVE REPLACEMENT      PARATHYROIDECTOMY      PARATHYROIDECTOMY  07/13/2017    PERCUTANEOUS TRANSLUMINAL ANGIOPLASTY OF ARTERIOVENOUS FISTULA N/A 1/7/2020    Procedure: PTA, AV FISTULA;  Surgeon: Joseph Loyola MD;  Location: [facility] CATH/EP LAB;  Service: Cardiology;  Laterality: N/A;    PERITONEAL CATHETER INSERTION      RENAL BIOPSY      vocal cord nodule      WOUND DEBRIDEMENT Left 7/13/2020    Procedure: DEBRIDEMENT, WOUND;  Surgeon: Carlos Elam III, MD;  Location: [facility] OR;  Service: General;  Laterality: Left;       ALLERGIES:   Patient has no known allergies.    FAMILY HISTORY:     Family History   Problem Relation Age of Onset    Heart disease Sister     Early death Sister         heart as baby    Heart disease Maternal Grandfather     Diabetes Mother     Hypertension Mother     Heart failure Mother     Heart disease Mother     Arthritis Mother     Diabetes Father     Early death Sister         infant       SOCIAL HISTORY:     Social History     Tobacco Use    Smoking status: Current Some Day Smoker     Packs/day: 0.50     Years: 45.00     Pack years: 22.50     Types: Cigarettes    Smokeless tobacco: Never Used   Substance Use Topics    Alcohol use: No        Social History     Substance and Sexual Activity   Sexual Activity Not on file        HOME MEDICATIONS:     Prior to Admission medications    Medication Sig Start Date End Date Taking? Authorizing Provider   aspirin (ECOTRIN) 81 MG EC tablet Take 81 mg by mouth once daily.    Historical Provider   calcium acetate (PHOSLO) 667 mg capsule Take 667 mg  "by mouth once daily.     Historical Provider   diltiaZEM (CARDIZEM CD) 180 MG 24 hr capsule Take 180 mg by mouth once daily.    Historical Provider   folic acid/vit B complex and C (JENNIFER-KODAK ORAL) Take 1 tablet by mouth once daily.    Historical Provider   isosorbide mononitrate (IMDUR) 30 MG 24 hr tablet Take 30 mg by mouth once daily.    Historical Provider   losartan (COZAAR) 25 MG tablet Take 25 mg by mouth once daily.    Historical Provider   magnesium oxide (MAG-OX) 400 mg (241.3 mg magnesium) tablet Take 1 tablet by mouth once daily 7/23/20   Juan Esparza MD   metoprolol succinate (TOPROL-XL) 25 MG 24 hr tablet Take 1 tablet (25 mg total) by mouth nightly. 9/18/20 10/18/20  Katharina Powell MD   ondansetron (ZOFRAN-ODT) 4 MG TbDL Take 4 mg by mouth every 6 (six) hours as needed.    Historical Provider   oxyCODONE-acetaminophen (PERCOCET) 5-325 mg per tablet Take 1 tablet by mouth every 6 (six) hours as needed for Pain.    Historical Provider   pantoprazole (PROTONIX) 40 MG tablet Take 1 tablet (40 mg total) by mouth once daily. 9/18/20 10/18/20  Katharina Powell MD   traMADoL (ULTRAM) 50 mg tablet Take 50 mg by mouth every 8 (eight) hours as needed for Pain.    Historical Provider   warfarin (COUMADIN) 5 MG tablet Take 5 mg by mouth once daily.    Historical Provider   wheelchair An 1 Device by Misc.(Non-Drug; Combo Route) route as needed (needed for transport). 9/8/20   Carlos Elam III, MD   zinc sulfate 220 mg Tab tablet Take 220 mg by mouth every other day.    Historical Provider         PHYSICAL EXAM:   BP (!) 163/95   Pulse 95   Temp 97.9 °F (36.6 °C) (Oral)   Resp (!) 22   Ht 5' 5" (1.651 m)   Wt 54.4 kg (120 lb)   LMP  (LMP Unknown)   SpO2 98%   Breastfeeding No   BMI 19.97 kg/m²   Vitals Reviewed  General appearance:  Ill-appearing cachectic female in no apparent distress.  Skin: No Rash.   Neuro: Motor and sensory exams grossly intact. Good tone. Power in all 4 " extremities 5/5.   HENT: Atraumatic head. Moist mucous membranes of oral cavity.  Eyes: Normal extraocular movements.   Neck: Supple. No evidence of lymphadenopathy. No thyroidomegaly.  Lungs: Clear to auscultation bilaterally. No wheezing present.   Heart:  Irregularly irregular rhythm positive arm S1 and S2 present with no murmurs/gallop/rub. No pedal edema.  Positives JVD present.   Abdomen: Soft, non-distended, non-tender. No rebound tenderness/guarding. No masses or organomegaly. Bowel sounds are normal. Bladder is not palpable.   Extremities: No cyanosis, clubbing, +edema bilateral lower legs right upper arm fistula with good thrill and bruit discoloration to bilateral lower extremities  Psych/mental status:  Drowsy just received Ativan prior to my exam on BiPAP.   EMERGENCY DEPARTMENT LABS AND IMAGING:   Following labs were Reviewed   Recent Labs   Lab 09/20/20 2115   WBC 7.61   HGB 9.2*   HCT 29.8*      CALCIUM 8.6*   ALBUMIN 3.3*   PROT 7.7      K 4.8   CO2 28   CL 94*   BUN 84*   CREATININE 6.7*   ALKPHOS 128   ALT 19   AST 36   BILITOT 0.7         BMP:   Recent Labs   Lab 09/20/20 2115   GLU 94      K 4.8   CL 94*   CO2 28   BUN 84*   CREATININE 6.7*   CALCIUM 8.6*   , CMP   Recent Labs   Lab 09/20/20 2115      K 4.8   CL 94*   CO2 28   GLU 94   BUN 84*   CREATININE 6.7*   CALCIUM 8.6*   PROT 7.7   ALBUMIN 3.3*   BILITOT 0.7   ALKPHOS 128   AST 36   ALT 19   ANIONGAP 19*   ESTGFRAFRICA 6.4*   EGFRNONAA 5.6*   , CBC   Recent Labs   Lab 09/20/20 2115   WBC 7.61   HGB 9.2*   HCT 29.8*      , INR   Lab Results   Component Value Date    INR 2.9 09/20/2020    INR 1.4 09/06/2020    INR 1.3 09/05/2020   , Lipid Panel   Lab Results   Component Value Date    CHOL 155 10/19/2019    HDL 56 10/19/2019    LDLCALC 82.6 10/19/2019    TRIG 82 10/19/2019    CHOLHDL 36.1 10/19/2019   , Troponin   Recent Labs   Lab 09/20/20 2115   TROPONINI 0.033   , A1C: No results for input(s): HGBA1C  in the last 4320 hours. and All labs within the past 24 hours have been reviewed  Microbiology Results (last 7 days)     Procedure Component Value Units Date/Time    Blood culture #1 **CANNOT BE ORDERED STAT** [[id number]] Collected: 09/20/20 2200    Order Status: Sent Specimen: Blood from Peripheral, Antecubital, Left Updated: 09/20/20 2239    Blood culture #2 **CANNOT BE ORDERED STAT** [[id number]] Collected: 09/20/20 2215    Order Status: Sent Specimen: Blood from Peripheral, Antecubital, Left Updated: 09/20/20 2239        X-Ray Chest AP Portable   ED Interpretation   Abnormal x-ray with right pleural effusion, COPD changes infiltrate or atelectasis in lower lobes bilaterally.      Ct Head Without Contrast    Result Date: 9/1/2020  CMS MANDATED QUALITY DATA - CT RADIATION  436 All CT scans at this facility utilize dose modulation, iterative reconstruction, and/or weight based dosing when appropriate to reduce radiation dose to as low as reasonably achievable. CT HEAD WITHOUT CONTRAST CLINICAL HISTORY: 75 years Female Head trauma, minor (Age => 66y) COMPARISON: April 22, 2020 FINDINGS: Negative for acute intracranial hemorrhage, midline shift, or mass effect. Ventricles and sulci are normal in size. Periventricular and deep white matter hypoattenuation consistent with microangiopathic change, stable from prior. Focal hypodensity involving anterior limb of left lentiform nucleus and corona radiata consistent with sequela of remote infarct, stable from prior. Stable calcifications within the right cerebral hemisphere. Cerebellar hemispheres and brainstem are unremarkable. Atherosclerotic calcification of the cavernous carotid arteries. No calvarial lesion or fracture. Visualized paranasal sinuses and mastoid air cells are clear. Large right frontal scalp hematoma. IMPRESSION: No CT evidence of acute intracranial pathology. Large right frontal scalp hematoma. 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 Electronically Signed by Harish CARCAMO on 9/1/2020 9:59 AM    Ct Cervical Spine Without Contrast    Result Date: 9/1/2020  All CT scans at this facility used dose modulation, iterative reconstruction and/or weight-based dosing when appropriate to reduce radiation doses  as low as reasonably achievable. CT scan of the cervical spine Clinical history is Neck trauma, mechanically unstable Axial images the cervical spine were obtained with sagittal and coronal reconstructed images. The cervical spine is in satisfactory alignment. The vertebral bodies are of normal height. There is no fracture or subluxation. The facet joints are aligned. The odontoid process is intact the cranial cervical junction is normal. There is no spinal stenosis or foraminal narrowing. The paraspinous soft tissues are normal. There is moderate vascular calcification at the carotid bifurcations. The paraspinous soft tissues are normal. There are emphysematous changes in the upper lobes. IMPRESSION: Normal CT scan of the cervical spine Moderate vascular calcification bilaterally. Electronically Signed by Keily Hernandez M.D. on 9/1/2020 10:12 AM    X-ray Chest Ap Portable    Result Date: 9/5/2020  HISTORY: Dyspnea, CHF. FINDINGS: Portable chest radiograph at 1139 hours compared to 08/17/2020 shows the cardiac silhouette is moderately enlarged, stable in size, with the pulmonary vasculature mildly prominent centrally, stable. There are aortic vascular calcifications and changes of prior valvuloplasty. There are scattered reticulonodular densities throughout both lungs, with right midlung and bilateral lower lung zone airspace opacities, as well as small bilateral pleural effusions, obscuring the diaphragm. There is no pneumothorax. The bones are diffusely osteopenic. IMPRESSION: 1. Mild scattered interstitial opacities in both lungs, potentially pulmonary edema and or hypervolemia, or viral or atypical  pneumonia. 2. Nonspecific lower lung airspace opacities suggesting atelectasis and or pneumonia, with small bilateral pleural effusions. 3. Stable moderate cardiomegaly. Electronically Signed by Brian CARCAMO on 9/5/2020 12:15 PM    ASSESSMENT & PLAN:   Griselda Neely is a 75 y.o. female admitted for    1.  Acute on chronic respiratory failure-multifactorial  -BiPAP  -aerosol nebulizer treatments with Xopenex and Atrovent  -continuous pulse oximetry monitoring  -pulmonary consult    2.  Acute on chronic heart failure  -IV Lasix  -trend cardiac enzymes and troponin  -monitor closely    3.  End-stage renal disease with hemodialysis 3 times a week  -consult nephrologist Dr. Ingram  -plan for hemodialysis in a.m.    4.  COPD exacerbation  -xopenox and atrovent Neb Tx Q6    5. history of atrial thrombus  -anticoagulated with Coumadin  -INR - 2.9  - Daily INR -     6.  Active tobacco use  -cessation discussed and encouraged    7.  The left upper thigh wound  -does not appear actively infected    8.  The patient is a DNR    9.  Possible pneumonia  -IV antibiotic therapy    10.  Atrial fibrillation with RVR  -Cardizem IV initiated drip if persistent tachycardia occurs  -on Coumadin     Critical Care time Spent: 59 minutes    High risk for clinical decline s/t acute on chronic respiratory failure acute on chronic heart disease end-stage renal disease in need of dialysis and multiple other comorbidities  DVT Prophylaxis: will be placed on on Coumadin for DVT prophylaxis and will be advised to be as mobile as possible and sit in a chair as tolerated.   ________________________________________________________________________________      Face-to-Face encounter date: 09/20/2020  Encounter included review of the medical records, interviewing and examining the patient face-to-face, discussion with family and other health care providers including emergency medicine physician, admission orders, interpreting lab/test results  and formulating a plan of care.   Medical Decision Making during this encounter was  [_] Low Complexity  [_] Moderate Complexity  [x] High Complexity  _________________________________________________________________________________    INPATIENT LIST OF MEDICATIONS     Current Facility-Administered Medications:     cefepime in dextrose 5 % IVPB 2 g, 2 g, Intravenous, Q12H, Zane Morales, DO, 2 g at 09/20/20 2201    Current Outpatient Medications:     aspirin (ECOTRIN) 81 MG EC tablet, Take 81 mg by mouth once daily., Disp: , Rfl:     calcium acetate (PHOSLO) 667 mg capsule, Take 667 mg by mouth once daily. , Disp: , Rfl:     diltiaZEM (CARDIZEM CD) 180 MG 24 hr capsule, Take 180 mg by mouth once daily., Disp: , Rfl:     folic acid/vit B complex and C (JENNIFER-KODAK ORAL), Take 1 tablet by mouth once daily., Disp: , Rfl:     isosorbide mononitrate (IMDUR) 30 MG 24 hr tablet, Take 30 mg by mouth once daily., Disp: , Rfl:     losartan (COZAAR) 25 MG tablet, Take 25 mg by mouth once daily., Disp: , Rfl:     magnesium oxide (MAG-OX) 400 mg (241.3 mg magnesium) tablet, Take 1 tablet by mouth once daily, Disp: 30 tablet, Rfl: 0    metoprolol succinate (TOPROL-XL) 25 MG 24 hr tablet, Take 1 tablet (25 mg total) by mouth nightly., Disp: 30 tablet, Rfl: 0    ondansetron (ZOFRAN-ODT) 4 MG TbDL, Take 4 mg by mouth every 6 (six) hours as needed., Disp: , Rfl:     oxyCODONE-acetaminophen (PERCOCET) 5-325 mg per tablet, Take 1 tablet by mouth every 6 (six) hours as needed for Pain., Disp: , Rfl:     pantoprazole (PROTONIX) 40 MG tablet, Take 1 tablet (40 mg total) by mouth once daily., Disp: 90 tablet, Rfl: 0    traMADoL (ULTRAM) 50 mg tablet, Take 50 mg by mouth every 8 (eight) hours as needed for Pain., Disp: , Rfl:     warfarin (COUMADIN) 5 MG tablet, Take 5 mg by mouth once daily., Disp: , Rfl:     wheelchair An, 1 Device by Misc.(Non-Drug; Combo Route) route as needed (needed for transport)., Disp: , Rfl: 0     zinc sulfate 220 mg Tab tablet, Take 220 mg by mouth every other day., Disp: , Rfl:       Scheduled Meds:   ceFEPime (MAXIPIME) IVPB  2 g Intravenous Q12H     Continuous Infusions:  PRN Meds:.      Chery Salazar  [facility] Hospitalist NP  09/20/2020

## 2020-09-21 NOTE — PLAN OF CARE
Spoke with pt's sister Aide Watson. Pt uses [facility]. Dr Kalie Neely is PCP. Pt has [facility] and it should be resumed at discharge. Cm to follow until discharge from hospital.   09/21/20 1011   Discharge Assessment   Assessment Type Discharge Planning Assessment   Confirmed/corrected address and phone number on facesheet? Yes   Assessment information obtained from? Other  (Aide Watson Sister  [phone])   Communicated expected length of stay with patient/caregiver no   Prior to hospitilization cognitive status: Alert/Oriented   Prior to hospitalization functional status: Assistive Equipment  (walker and cane cannot walk very far.)   Current cognitive status: Unable to Assess   Current Functional Status: Partially Dependent   Lives With sibling(s)  (Lives with Aide Watson.)   Able to Return to Prior Arrangements yes   Is patient able to care for self after discharge? Unable to determine at this time (comments)   Who are your caregiver(s) and their phone number(s)? Aide Watson Sister  [phone] Elvi Hi  sister [phone]   Patient's perception of discharge disposition home health  ([facility])   Readmission Within the Last 30 Days previous discharge plan unsuccessful   If yes, most recent facility name: [facility]   Patient currently being followed by outpatient case management? Yes   If yes, name of outpatient case management following: insurance company assigned oupatient case management   Patient currently receives any other outside agency services? No   Equipment Currently Used at Home walker, rolling;hospital bed;3-in-1 commode;oxygen;nebulizer   Part D Coverage [employer]   Do you have any problems affording any of your prescribed medications? No   Is the patient taking medications as prescribed? yes   Does the patient have transportation home? Yes   Transportation Anticipated family or friend will provide   Dialysis Name and Scheduled days [facility]   MWF   Does the patient receive services at the [facility]? No   Discharge Plan A Home Health   Discharge Plan B Home Health   DME Needed Upon Discharge  wheelchair   Patient/Family in Agreement with Plan no   Readmission Questionnaire   At the time of your discharge, did someone talk to you about what your health problems were? Yes   At the time of discharge, did someone talk to you about what to watch out for regarding worsening of your health problem? Yes   At the time of discharge, did someone talk to you about what to do if you experienced worsening of your health problem? Yes   At the time of discharge, did someone talk to you about which medication to take when you left the hospital and which ones to stop taking? Yes   At the time of discharge, did someone talk to you about when and where to follow up with a doctor after you left the hospital? Yes   What do you believe caused you to be sick enough to be re-admitted? SOB   How often do you need to have someone help you when you read instructions, pamphlets, or other written material from your doctor or pharmacy? Never   Do you have problems taking your medications as prescribed? No   Do you have any problems affording any of  your prescribed medications? No   Do you have problems obtaining/receiving your medications? No   Does the patient have transportation to healthcare appointments? No   Living Arrangements house   Does the patient have family/friends to help with healtcare needs after discharge? yes   Does your caregiver provide all the help you need? Yes

## 2020-09-21 NOTE — PLAN OF CARE
09/21/20 0856   Patient Assessment/Suction   Level of Consciousness (AVPU) alert   All Lung Fields Breath Sounds coarse   Cough Frequency no cough   PRE-TX-O2   O2 Device (Oxygen Therapy) BiPAP   $ Is the patient on Low Flow Oxygen? Yes   SpO2 100 %   Pulse Oximetry Type Continuous   $ Pulse Oximetry - Multiple Charge Pulse Oximetry - Multiple   Pulse 110   Resp (!) 28   Aerosol Therapy   $ Aerosol Therapy Charges Aerosol Treatment   Respiratory Treatment Status (SVN) given   Treatment Route (SVN) in-line   Patient Position (SVN) semi-Clark's   Post Treatment Assessment (SVN) breath sounds unchanged   Signs of Intolerance (SVN) none   Breath Sounds Post-Respiratory Treatment   Post-treatment Heart Rate (beats/min) 103   Post-treatment Resp Rate (breaths/min) 26   Preset CPAP/BiPAP Settings   Mode Of Delivery BiPAP   $ Is patient using? Yes   Size of Mask Small/Medium   Sized Appropriately? Yes   Ipap 14   EPAP (cm H2O) 6   Pressure Support (cm H2O) 8   ITime (sec) 0.9   Rise Time (sec) 3   Patient CPAP/BiPAP Settings   RR Total (Breaths/Min) 28   Tidal Volume (mL) 438   VE Minute Ventilation (L/min) 12.1 L/min   Peak Inspiratory Pressure (cm H2O) 15   Total Leak (L/Min) 18   CPAP/BiPAP Backup Settings   Backup Rate 12 breaths per minute (bpm)   CPAP/BiPAP Alarms   High Pressure (cm H2O) 40   Low Pressure (cm H2O) 10   Minute Ventilation (L/Min) 2   High RR (breaths/min) 40   Low RR (breaths/min) 8   Apnea (Sec) 20

## 2020-09-21 NOTE — RESPIRATORY THERAPY
09/20/20 2133   Patient Assessment/Suction   Level of Consciousness (AVPU) alert   Respiratory Effort Mild;Shallow   Expansion/Accessory Muscles/Retractions accessory muscle use;expansion symmetric;no retractions   All Lung Fields Breath Sounds absent   Rhythm/Pattern, Respiratory shallow;tachypneic;pattern regular   PRE-TX-O2   O2 Device (Oxygen Therapy) nasal cannula   $ Is the patient on Low Flow Oxygen? Yes   Flow (L/min) 4   SpO2 100 %   Pulse Oximetry Type Continuous   $ Pulse Oximetry - Multiple Charge Pulse Oximetry - Multiple   Pulse 104   Resp (!) 22   Aerosol Therapy   $ Aerosol Therapy Charges Aerosol Treatment   Daily Review of Necessity (SVN) completed   Respiratory Treatment Status (SVN) given   Treatment Route (SVN) air;mask   Patient Position (SVN) Clark's   Post Treatment Assessment (SVN) increased aeration   Signs of Intolerance (SVN) none   Breath Sounds Post-Respiratory Treatment   Throughout All Fields Post-Treatment All Fields   Throughout All Fields Post-Treatment aeration increased   Post-treatment Heart Rate (beats/min) 90   Post-treatment Resp Rate (breaths/min) 24   Respiratory Interventions   Cough And Deep Breathing done with encouragement   Breathing Techniques/Airway Clearance deep/controlled cough encouraged;diaphragmatic breathing promoted   Education   $ Education Bronchodilator;Other (see comment);15 min  (o2,deep diaphragmatic breathing exercises)   Respiratory Evaluation   $ Care Plan Tech Time 15 min   Evaluation For New Orders   Admitting Diagnosis SOB        09/20/20 2133   Patient Assessment/Suction   Level of Consciousness (AVPU) alert   Respiratory Effort Mild;Shallow   Expansion/Accessory Muscles/Retractions accessory muscle use;expansion symmetric;no retractions   All Lung Fields Breath Sounds absent   Rhythm/Pattern, Respiratory shallow;tachypneic;pattern regular   PRE-TX-O2   O2 Device (Oxygen Therapy) nasal cannula   $ Is the patient on Low Flow Oxygen? Yes   Flow  (L/min) 4   SpO2 100 %   Pulse Oximetry Type Continuous   $ Pulse Oximetry - Multiple Charge Pulse Oximetry - Multiple   Pulse 104   Resp (!) 22   Aerosol Therapy   $ Aerosol Therapy Charges Aerosol Treatment   Daily Review of Necessity (SVN) completed   Respiratory Treatment Status (SVN) given   Treatment Route (SVN) air;mask   Patient Position (SVN) Clark's   Post Treatment Assessment (SVN) increased aeration   Signs of Intolerance (SVN) none   Breath Sounds Post-Respiratory Treatment   Throughout All Fields Post-Treatment All Fields   Throughout All Fields Post-Treatment aeration increased   Post-treatment Heart Rate (beats/min) 90   Post-treatment Resp Rate (breaths/min) 24   Respiratory Interventions   Cough And Deep Breathing done with encouragement   Breathing Techniques/Airway Clearance deep/controlled cough encouraged;diaphragmatic breathing promoted   Education   $ Education Bronchodilator;Other (see comment);15 min  (o2,deep diaphragmatic breathing exercises)   Respiratory Evaluation   $ Care Plan Tech Time 15 min   Evaluation For New Orders   Admitting Diagnosis SOB

## 2020-09-21 NOTE — CONSULTS
[facility]  Nephrology  Consult Note    Patient Name: Griselda Neely  MRN: [id number]  Admission Date: 9/20/2020  Hospital Length of Stay: 1 days  Attending Provider: Cirstian Lopez MD   Primary Care Physician: Kalie Neely MD  Principal Problem:Acute on chronic respiratory failure    Consults  Subjective:     HPI: Griselda Neely is a 75 year-old female who arrived at [facility] on 9/20 with SOB for a few days but worsening upon arrival.  PMHx of Anemia, AF, CHF, COPD (home O2), Gout, osteoarthritis, Pancreatitis,PVD, PNA, Calciphylaxis, and ESRD. Denied any other associated symptoms.  Currently being treated for a left upper thigh wound.  She attends HD [facility] ([facility] M-W-F schedule.  Right arm AVF.  Her nephrologist is Dr. Ingram. Nephrology is consulted for ESRD management. Last HD on Friday.       HD in progress during consult.        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    both legs    CHF (congestive heart failure)     Encounter for blood transfusion 03/2016    Gout     Hemodialysis access, AV graft     Hypertension     Mitral valve regurgitation     Osteoarthritis     Pancreatitis     Peripheral vascular disease     Pneumonia 09/09/2017    Renal failure        Past Surgical History:   Procedure Laterality Date    ACHILLES TENDON SURGERY Right     ANGIOGRAPHY OF ARTERIOVENOUS SHUNT Right 1/7/2020    Procedure: Fistulogram with Possible Intervention;  Surgeon: Joseph Loyola MD;  Location: [facility] CATH/EP LAB;  Service: Cardiology;  Laterality: Right;    ANGIOGRAPHY OF LOWER EXTREMITY Left 3/18/2020    Procedure: Angiogram Extremity Unilateral;  Surgeon: Ali Khoobehi, MD;  Location: [facility] CATH/EP LAB;  Service: Cardiology;  Laterality: Left;    APPENDECTOMY      CARDIAC CATHETERIZATION  07/03/2017    CHOLECYSTECTOMY      ESOPHAGOGASTRODUODENOSCOPY Left 8/17/2020    Procedure: EGD  (ESOPHAGOGASTRODUODENOSCOPY);  Surgeon: Talat Trevizo MD;  Location: [facility] ENDO;  Service: Endoscopy;  Laterality: Left;    heal surgery Right     HYSTERECTOMY      INSERTION OF STENT INTO PERIPHERAL VESSEL N/A 1/7/2020    Procedure: INSERTION, STENT, VESSEL, PERIPHERAL;  Surgeon: Joseph Loyola MD;  Location: [facility] CATH/EP LAB;  Service: Cardiology;  Laterality: N/A;    MITRAL VALVE REPLACEMENT      PARATHYROIDECTOMY      PARATHYROIDECTOMY  07/13/2017    PERCUTANEOUS TRANSLUMINAL ANGIOPLASTY OF ARTERIOVENOUS FISTULA N/A 1/7/2020    Procedure: PTA, AV FISTULA;  Surgeon: Joseph Loyola MD;  Location: [facility] CATH/EP LAB;  Service: Cardiology;  Laterality: N/A;    PERITONEAL CATHETER INSERTION      RENAL BIOPSY      vocal cord nodule      WOUND DEBRIDEMENT Left 7/13/2020    Procedure: DEBRIDEMENT, WOUND;  Surgeon: Carlos Elam III, MD;  Location: [facility] OR;  Service: General;  Laterality: Left;       Review of patient's allergies indicates:  No Known Allergies  Current Facility-Administered Medications   Medication Frequency    0.9%  NaCl infusion PRN    0.9%  NaCl infusion Once    aspirin EC tablet 81 mg Daily    calcium acetate(phosphat bind) capsule 667 mg Daily    cefepime in dextrose 5 % IVPB 2 g Q12H    chlorhexidine 0.12 % solution 15 mL BID    diltiaZEM 125 mg in D5W 125 mL infusion Continuous    diltiaZEM 24 hr capsule 180 mg Daily    ipratropium 0.02 % nebulizer solution 0.5 mg Q6H    isosorbide mononitrate 24 hr tablet 30 mg Daily    levalbuterol nebulizer solution 0.63 mg Q6H    lorazepam injection 1 mg Q4H PRN    magnesium oxide tablet 400 mg Daily    metoprolol succinate (TOPROL-XL) 24 hr tablet 25 mg Nightly    mupirocin 2 % ointment BID    pantoprazole EC tablet 40 mg Before breakfast    sodium chloride 0.9% flush 10 mL PRN    traMADoL tablet 50 mg Q8H PRN    warfarin (COUMADIN) tablet 5 mg Daily    zinc sulfate capsule 220 mg Daily     Family History     Problem  Relation (Age of Onset)    Arthritis Mother    Diabetes Mother, Father    Early death Sister, Sister    Heart disease Sister, Maternal Grandfather, Mother    Heart failure Mother    Hypertension Mother        Tobacco Use    Smoking status: Current Some Day Smoker     Packs/day: 0.50     Years: 45.00     Pack years: 22.50     Types: Cigarettes    Smokeless tobacco: Never Used   Substance and Sexual Activity    Alcohol use: No    Drug use: No    Sexual activity: Not on file     Review of Systems   Unable to perform ROS: Other (fatigued; unclear speech)     Objective:     Vital Signs (Most Recent):  Temp: 96.8 °F (36 °C) (09/21/20 1101)  Pulse: (!) 127 (09/21/20 1431)  Resp: (!) 26 (09/21/20 1431)  BP: 104/68 (09/21/20 1431)  SpO2: 100 % (09/21/20 1431)  O2 Device (Oxygen Therapy): nasal cannula (09/21/20 1326) Vital Signs (24h Range):  Temp:  [96.8 °F (36 °C)-97.9 °F (36.6 °C)] 96.8 °F (36 °C)  Pulse:  [] 127  Resp:  [20-33] 26  SpO2:  [92 %-100 %] 100 %  BP: (104-187)/() 104/68     Weight: 58 kg (127 lb 13.9 oz) (09/21/20 0701)  Body mass index is 21.28 kg/m².  Body surface area is 1.63 meters squared.    No intake/output data recorded.    Physical Exam  Constitutional:       Appearance: She is ill-appearing.   HENT:      Head: Normocephalic and atraumatic.      Nose: Nose normal.      Mouth/Throat:      Mouth: Mucous membranes are moist.   Eyes:      Extraocular Movements: Extraocular movements intact.      Pupils: Pupils are equal, round, and reactive to light.   Neck:      Comments: No JVD  Cardiovascular:      Rate and Rhythm: Tachycardia present. Rhythm irregular.      Comments: A-fib RVR  Pulmonary:      Breath sounds: No stridor. Wheezing present. No rhonchi.      Comments: Diminished; fine crackles  Abdominal:      General: There is no distension.      Palpations: Abdomen is soft.      Tenderness: There is no abdominal tenderness.   Musculoskeletal:         General: No swelling.      Right  lower leg: No edema.      Left lower leg: No edema.   Skin:     General: Skin is warm and dry.      Comments: AVF right arm +bruit/thrill   Neurological:      Comments: Sleepy/fatigued; PA at this time         Significant Labs:  ABGs:   Recent Labs   Lab 09/21/20  0006   PH 7.398   PCO2 47.1*   HCO3 29.1*   POCSATURATED 79*   BE 4     BMP:   Recent Labs   Lab 09/21/20  0337   *   CL 95   CO2 25   BUN 92*   CREATININE 6.8*   CALCIUM 8.8   MG 1.9     Cardiac Markers: No results for input(s): CKMB, TROPONINT, MYOGLOBIN in the last 168 hours.  CBC:   Recent Labs   Lab 09/21/20 0339   WBC 8.41   RBC 3.49*   HGB 9.6*   HCT 31.5*      MCV 90   MCH 27.5   MCHC 30.5*     CMP:   Recent Labs   Lab 09/21/20 0337   *   CALCIUM 8.8   ALBUMIN 3.4*   PROT 8.2      K 5.2*   CO2 25   CL 95   BUN 92*   CREATININE 6.8*   ALKPHOS 141*   ALT 24   AST 43*   BILITOT 0.9     Coagulation:   Recent Labs   Lab 09/21/20  0548   INR 3.2     LFTs:   Recent Labs   Lab 09/21/20 0337   ALT 24   AST 43*   ALKPHOS 141*   BILITOT 0.9   PROT 8.2   ALBUMIN 3.4*     Microbiology Results (last 7 days)     Procedure Component Value Units Date/Time    Blood culture #1 **CANNOT BE ORDERED STAT** [[id number]] Collected: 09/20/20 2200    Order Status: Completed Specimen: Blood from Peripheral, Antecubital, Left Updated: 09/21/20 0517     Blood Culture, Routine No Growth to date    Blood culture #2 **CANNOT BE ORDERED STAT** [[id number]] Collected: 09/20/20 2215    Order Status: Completed Specimen: Blood from Peripheral, Antecubital, Left Updated: 09/21/20 0517     Blood Culture, Routine No Growth to date        Specimen (12h ago, onward)    None        PTH: No results for input(s): PTH in the last 168 hours.  TSH:   Recent Labs   Lab 09/21/20  0337   TSH 3.210     No results for input(s): COLORU, CLARITYU, SPECGRAV, PHUR, PROTEINUA, GLUCOSEU, BILIRUBINCON, BLOODU, WBCU, RBCU, BACTERIA, MUCUS, NITRITE, LEUKOCYTESUR, UROBILINOGEN,  HYALINECASTS in the last 168 hours.  All labs within the past 24 hours have been reviewed.    Significant Imaging:      Assessment/Plan:     Active Diagnoses:    Diagnosis Date Noted POA    PRINCIPAL PROBLEM:  Acute on chronic respiratory failure [J96.20] 05/13/2019 Unknown    Acute on chronic heart failure [I50.9] 09/20/2020 Yes    Atrial thrombus [I51.3] 09/05/2020 Yes    H/O mitral valve replacement [Z95.2] 03/17/2020 Not Applicable     Chronic    DNR (do not resuscitate) [Z66] 09/30/2019 Yes     Chronic    ESRD (end stage renal disease) on HD M,W, F [N18.6] 05/11/2016 Yes     Chronic    Tobacco dependence [F17.200] 09/09/2013 Yes     Chronic    HTN (hypertension) [I10] 08/15/2013 Yes     Chronic      Problems Resolved During this Admission:     Assessment and Plan:    ESRD:  -M-W-F schedule  -CRS- hypervolemia; HD today goal 4 L or until BP drops  -renal chemistries stable  -high protein/ low K diet  -strict I&O  -Daily weight  -Renal dose all meds/appropriate GFR  -No BP/IV stick right arm  -Anuric    Acute on chronic HF:  -elevated BNP  -last echo 1/2020; 25%  -trending enzymes  -CXR- revealing small right-sided effusion and trace amount of left-sided pleural  fluid.  -Cardiology consulted    Atrial Fibrillation with RVR:  -continues Cardizem gtt  -on coumadin    Acute on chronic Resp.Failure:  -nebulizers  -O2; titrate  -pulmonology consulting    Anemia:  -below goal 10-12  -monitor closley    Renal BMD:  -Ca and Phos ok  -may hold binders    Hypoalbuminemia:  -3.4g/dL  -maximize nutrition    Hypokalemia:  -titrate bath with HD        Thank you for your consult. I will follow-up with patient. Please contact us if you have any additional questions.    Gabe Barksdale, KANE  Nephrology  [facility]

## 2020-09-21 NOTE — CONSULTS
[facility]  Cardiology  Consult Note    Patient Name: Griselda Neely  MRN: [id number]  Admission Date: 9/20/2020  Hospital Length of Stay: 1 days  Code Status: DNR   Attending Provider: Cristian Lopez MD   Consulting Provider: Katharina Powell MD  Primary Care Physician: Kalie Neely MD  Principal Problem:Acute on chronic respiratory failure    Patient information was obtained from patient, past medical records and ER records.     Inpatient consult to Cardiology  Consult performed by: Katharina Powell MD  Consult ordered by: Cristian Lopez MD        Subjective:     REASON FOR CONSULT:  CHF     HPI:  75-year-old female with a past medical history significant for congestive heart failure with a reduced LV systolic function, persistent atrial fibrillation, left atrial thrombus, end-stage renal disease on hemodialysis presented to the hospital with complaints of shortness of shortness of breath reportedly has been going on over the past 3 days or so with increased intensity.    His history was obtained from the sister as patient is currently drowsy and no meaningful history could be obtained.  Per sister a patient reportedly has not complained of any chest pain.  She however has been short of breath and her blood pressure reportedly has been running high.  She reportedly has been going to the dialysis.  She continues to smoke.  No history of any increased lower extremity edema.  No history of any fevers.  She has been reportedly coughing productive of whitish sputum.  Patient was reportedly very agitated and received Ativan overnight and she has been drowsy.    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    both legs    CHF (congestive heart failure)     Encounter for blood transfusion 03/2016    Gout     Hemodialysis access, AV graft     Hypertension     Mitral valve regurgitation     Osteoarthritis      Pancreatitis     Peripheral vascular disease     Pneumonia 09/09/2017    Renal failure        Past Surgical History:   Procedure Laterality Date    ACHILLES TENDON SURGERY Right     ANGIOGRAPHY OF ARTERIOVENOUS SHUNT Right 1/7/2020    Procedure: Fistulogram with Possible Intervention;  Surgeon: Joseph Loyola MD;  Location: [facility] CATH/EP LAB;  Service: Cardiology;  Laterality: Right;    ANGIOGRAPHY OF LOWER EXTREMITY Left 3/18/2020    Procedure: Angiogram Extremity Unilateral;  Surgeon: Ali Khoobehi, MD;  Location: [facility] CATH/EP LAB;  Service: Cardiology;  Laterality: Left;    APPENDECTOMY      CARDIAC CATHETERIZATION  07/03/2017    CHOLECYSTECTOMY      ESOPHAGOGASTRODUODENOSCOPY Left 8/17/2020    Procedure: EGD (ESOPHAGOGASTRODUODENOSCOPY);  Surgeon: Talat Trevizo MD;  Location: [facility] ENDO;  Service: Endoscopy;  Laterality: Left;    heal surgery Right     HYSTERECTOMY      INSERTION OF STENT INTO PERIPHERAL VESSEL N/A 1/7/2020    Procedure: INSERTION, STENT, VESSEL, PERIPHERAL;  Surgeon: Joseph Loyola MD;  Location: [facility] CATH/EP LAB;  Service: Cardiology;  Laterality: N/A;    MITRAL VALVE REPLACEMENT      PARATHYROIDECTOMY      PARATHYROIDECTOMY  07/13/2017    PERCUTANEOUS TRANSLUMINAL ANGIOPLASTY OF ARTERIOVENOUS FISTULA N/A 1/7/2020    Procedure: PTA, AV FISTULA;  Surgeon: Joseph Loyola MD;  Location: [facility] CATH/EP LAB;  Service: Cardiology;  Laterality: N/A;    PERITONEAL CATHETER INSERTION      RENAL BIOPSY      vocal cord nodule      WOUND DEBRIDEMENT Left 7/13/2020    Procedure: DEBRIDEMENT, WOUND;  Surgeon: Carlos Elam III, MD;  Location: [facility] OR;  Service: General;  Laterality: Left;       Review of patient's allergies indicates:  No Known Allergies    No current facility-administered medications on file prior to encounter.      Current Outpatient Medications on File Prior to Encounter   Medication Sig    aspirin (ECOTRIN) 81 MG EC tablet Take 81 mg by mouth once daily.     calcium acetate (PHOSLO) 667 mg capsule Take 667 mg by mouth once daily.     diltiaZEM (CARDIZEM CD) 180 MG 24 hr capsule Take 180 mg by mouth once daily.    losartan (COZAAR) 25 MG tablet Take 25 mg by mouth once daily.    metoprolol succinate (TOPROL-XL) 25 MG 24 hr tablet Take 1 tablet (25 mg total) by mouth nightly.    ondansetron (ZOFRAN-ODT) 4 MG TbDL Take 4 mg by mouth every 6 (six) hours as needed.    oxyCODONE-acetaminophen (PERCOCET) 5-325 mg per tablet Take 1 tablet by mouth every 6 (six) hours as needed for Pain.    pantoprazole (PROTONIX) 40 MG tablet Take 1 tablet (40 mg total) by mouth once daily.    traMADoL (ULTRAM) 50 mg tablet Take 50 mg by mouth every 8 (eight) hours as needed for Pain.    warfarin (COUMADIN) 5 MG tablet Take 5 mg by mouth once daily.    folic acid/vit B complex and C (JENNIFER-KODAK ORAL) Take 1 tablet by mouth once daily.    isosorbide mononitrate (IMDUR) 30 MG 24 hr tablet Take 30 mg by mouth once daily.    magnesium oxide (MAG-OX) 400 mg (241.3 mg magnesium) tablet Take 1 tablet by mouth once daily    wheelchair An 1 Device by Misc.(Non-Drug; Combo Route) route as needed (needed for transport).    zinc sulfate 220 mg Tab tablet Take 220 mg by mouth every other day.       Scheduled Meds:   sodium chloride 0.9%   Intravenous Once    aspirin  81 mg Oral Daily    calcium acetate(phosphat bind)  667 mg Oral Daily    ceFEPime (MAXIPIME) IVPB  2 g Intravenous Q12H    chlorhexidine  15 mL Mouth/Throat BID    diltiaZEM  180 mg Oral Daily    ipratropium  0.5 mg Nebulization Q6H    isosorbide mononitrate  30 mg Oral Daily    levalbuterol  0.63 mg Nebulization Q6H    magnesium oxide  400 mg Oral Daily    metoprolol succinate  25 mg Oral Nightly    mupirocin   Nasal BID    pantoprazole  40 mg Oral Before breakfast    zinc sulfate  220 mg Oral Daily     Continuous Infusions:   dilTIAZem 10 mg/hr (09/21/20 1400)     PRN Meds:.sodium chloride 0.9%, lorazepam,  sodium chloride 0.9%, traMADoL    Family History     Problem Relation (Age of Onset)    Arthritis Mother    Diabetes Mother, Father    Early death Sister, Sister    Heart disease Sister, Maternal Grandfather, Mother    Heart failure Mother    Hypertension Mother          Tobacco Use    Smoking status: Current Some Day Smoker     Packs/day: 0.50     Years: 45.00     Pack years: 22.50     Types: Cigarettes    Smokeless tobacco: Never Used   Substance and Sexual Activity    Alcohol use: No    Drug use: No    Sexual activity: Not on file       ROS   Unobtainable.   Objective:     Vital Signs (Most Recent):  Temp: 97 °F (36.1 °C) (09/21/20 1501)  Pulse: (!) 137 (09/21/20 1601)  Resp: (!) 22 (09/21/20 1601)  BP: (!) 142/75 (09/21/20 1601)  SpO2: 100 % (09/21/20 1601) Vital Signs (24h Range):  Temp:  [96.8 °F (36 °C)-97.9 °F (36.6 °C)] 97 °F (36.1 °C)  Pulse:  [] 137  Resp:  [20-33] 22  SpO2:  [92 %-100 %] 100 %  BP: (104-187)/() 142/75     Weight: 58 kg (127 lb 13.9 oz)  Body mass index is 21.28 kg/m².    SpO2: 100 %  O2 Device (Oxygen Therapy): nasal cannula    No intake or output data in the 24 hours ending 09/21/20 1630    Lines/Drains/Airways     Drain                 Hemodialysis AV Fistula 08/08/19 0214 Right upper arm 410 days          Peripheral Intravenous Line                 Peripheral IV - Single Lumen 09/20/20 2142 22 G Left Hand less than 1 day         Peripheral IV - Single Lumen 09/21/20 0815 20 G Anterior;Left;Proximal;Lateral Forearm less than 1 day                Physical Exam  HEENT: Normocephalic, atraumatic, PERRL, Conjunctiva pink, no scleral icterus.   CVS: S1S2+, RRR, no murmurs, rubs or gallops, JVP: Normal.  LUNGS: Crackles+  ABDOMEN: Soft, NT, BS+  EXTREMITIES: No cyanosis, clubbing or edema  NEURO: Currently drowsy and does not obey simple commands. .       Significant Labs:   BMP:   Recent Labs   Lab 09/20/20 2115 09/21/20  0337   GLU 94 158*    140   K 4.8 5.2*   CL  94* 95   CO2 28 25   BUN 84* 92*   CREATININE 6.7* 6.8*   CALCIUM 8.6* 8.8   MG  --  1.9   , CMP   Recent Labs   Lab 09/20/20 2115 09/21/20  0337    140   K 4.8 5.2*   CL 94* 95   CO2 28 25   GLU 94 158*   BUN 84* 92*   CREATININE 6.7* 6.8*   CALCIUM 8.6* 8.8   PROT 7.7 8.2   ALBUMIN 3.3* 3.4*   BILITOT 0.7 0.9   ALKPHOS 128 141*   AST 36 43*   ALT 19 24   ANIONGAP 19* 20*   ESTGFRAFRICA 6.4* 6.3*   EGFRNONAA 5.6* 5.5*   , CBC   Recent Labs   Lab 09/20/20 2115 09/21/20  0339   WBC 7.61 8.41   HGB 9.2* 9.6*   HCT 29.8* 31.5*    307   , INR   Recent Labs   Lab 09/20/20 2115 09/21/20  0548   INR 2.9 3.2   , Lipid Panel No results for input(s): CHOL, HDL, LDLCALC, TRIG, CHOLHDL in the last 48 hours. and Troponin   Recent Labs   Lab 09/20/20 2115 09/21/20  0337   TROPONINI 0.033 0.033       Significant Imaging: Reviewed  Assessment and Plan:     IMPRESSION:  Congestive heart failure. Reduced LV systolic function.  Volume overload on exam.     Mitral regurgitation.  Severe.  With severe calcification.  Not a candidate for percutaneous interventions.  After discussion with Dr. Lora, she decided to be treated only with medical management. Newly reduced LVEF to 35-40% on 4/11/2019. Status post mitral valve replacement with a size 25 mm [employer] bovine pericardial prosthesis via right thoracotomy on by Dr. Lora 3/21/2019.      Atrial fibrillation.  Persistent. Currently in atrial fibrillation with RVR.  On coumadin with therapeutic INR.       End-stage renal disease on hemodialysis.  MWF. Follows with Dr. Ingram.      Tobacco abuse.  Ongoing despite counseling.     Left atrial thrombus.      CODE STATUS was addressed on 7/16/2020 and the patient was alert, oriented ×3.  She preferred to be a DNI.  She would like to be resuscitated. CODE STATUS was addressed with the presence of her sister and son.       RECOMMENDATIONS:  1.  Continue fluid removal with hemodialysis.  2.  Continue Cardizem  GTT.   3.  Continue her home medications otherwise.  4.  Discussed with patient's sister.  5.  Further decisions to follow based upon the hospital course.    Thank you for your consult. I will follow-up with patient. Please contact us if you have any additional questions.    Katharina Powell MD  Cardiology   [facility], later [facility]

## 2020-09-21 NOTE — ED NOTES
Left leg wound unwrapped by ED physician and redressed with non adherent dressing and coban. Wound healing well. No signs of infection.

## 2020-09-21 NOTE — RESPIRATORY THERAPY
This note also relates to the following rows which could not be included:  SpO2 - Cannot attach notes to unvalidated device data  Pulse - Cannot attach notes to unvalidated device data  Resp - Cannot attach notes to unvalidated device data  BP - Cannot attach notes to unvalidated device data       09/20/20 2200   Patient Assessment/Suction   Level of Consciousness (AVPU) alert   Respiratory Effort Slight   Expansion/Accessory Muscles/Retractions accessory muscle use   All Lung Fields Breath Sounds clear   Rhythm/Pattern, Respiratory assisted mechanically   Cough Type bronchospastic;fair;nonproductive   PRE-TX-O2   O2 Device (Oxygen Therapy) BiPAP   Pulse Oximetry Type Continuous   Respiratory Interventions   Airway/Ventilation Support comfort measures provided   Preset CPAP/BiPAP Settings   Mode Of Delivery BiPAP S/T   $ Initial CPAP/BiPAP Setup? Yes   $ Is patient using? Yes   Size of Mask Small/Medium   Sized Appropriately? Yes   Equipment Type V60   Airway Device Type medium full face mask   Ipap 12   EPAP (cm H2O) 6   Pressure Support (cm H2O) 6   ITime (sec) 1   Rise Time (sec) 0.2   Patient CPAP/BiPAP Settings   Timed Inspiration (Sec) 1   IPAP Rise Time (sec) 0.2   RR Total (Breaths/Min) 30   Tidal Volume (mL) 503   VE Minute Ventilation (L/min) 15 L/min   Peak Inspiratory Pressure (cm H2O) 13   TiTOT (%) 35   Total Leak (L/Min) 4   Patient Trigger - ST Mode Only (%) 80   Education   $ Education BiPAP   Respiratory Evaluation   $ Care Plan Tech Time 15 min   Evaluation For New Orders        09/20/20 2200   Patient Assessment/Suction   Level of Consciousness (AVPU) alert   Respiratory Effort Slight   Expansion/Accessory Muscles/Retractions accessory muscle use   All Lung Fields Breath Sounds clear   Rhythm/Pattern, Respiratory assisted mechanically   Cough Type bronchospastic;fair;nonproductive   PRE-TX-O2   O2 Device (Oxygen Therapy) BiPAP   Pulse Oximetry Type Continuous   Respiratory Interventions    Airway/Ventilation Support comfort measures provided   Preset CPAP/BiPAP Settings   Mode Of Delivery BiPAP S/T   $ Initial CPAP/BiPAP Setup? Yes   $ Is patient using? Yes   Size of Mask Small/Medium   Sized Appropriately? Yes   Equipment Type V60   Airway Device Type medium full face mask   Ipap 12   EPAP (cm H2O) 6   Pressure Support (cm H2O) 6   ITime (sec) 1   Rise Time (sec) 0.2   Patient CPAP/BiPAP Settings   Timed Inspiration (Sec) 1   IPAP Rise Time (sec) 0.2   RR Total (Breaths/Min) 30   Tidal Volume (mL) 503   VE Minute Ventilation (L/min) 15 L/min   Peak Inspiratory Pressure (cm H2O) 13   TiTOT (%) 35   Total Leak (L/Min) 4   Patient Trigger - ST Mode Only (%) 80   Education   $ Education BiPAP   Respiratory Evaluation   $ Care Plan Tech Time 15 min   Evaluation For New Orders   Pt still C/O of SOB post Neb tx. Pt ask about Bipap. Int Bipap.

## 2020-09-21 NOTE — RESPIRATORY THERAPY
This note also relates to the following rows which could not be included:  SpO2 - Cannot attach notes to unvalidated device data  Pulse - Cannot attach notes to unvalidated device data  Resp - Cannot attach notes to unvalidated device data       09/21/20 0004   Patient Assessment/Suction   Level of Consciousness (AVPU) responds to pain   PRE-TX-O2   O2 Device (Oxygen Therapy) BiPAP   Pulse Oximetry Type Continuous   Preset CPAP/BiPAP Settings   Mode Of Delivery BiPAP S/T   $ Is patient using? Yes   Ipap 14   EPAP (cm H2O) 8   Pressure Support (cm H2O) 6   Education   $ Education Other (see comment);15 min  (abg)   Labs   $ Was an ABG obtained? Arterial Puncture;ISTAT - Blood gas   $ Labs Tech Time 15 min   Critical Value Communication   Date Result Received 09/21/20   Time Result Received 0006   Resulting Department of Critical Value resp   Who communicated critical value from resulting department? dblack   Critical Test #1 po2   Critical Test #1 Result 44   Name of Notified Physician/Designee KANE Salazar   Date Notified 09/21/20   Time Notified 0009   Read Back Verification Yes   Physician Directive venous sample

## 2020-09-21 NOTE — PROGRESS NOTES
Pt on bipap, sats 99%. Confused. Does not follow commands. Restless. Refused care. Moves spontaneously. Unable to get pt to cooperate.  AV fistula to the right arm-bruit and thrill present. cardizem drip in progress. Will continue to monitor.

## 2020-09-21 NOTE — PROGRESS NOTES
[facility]  Progress Note    Patient name: Griselda Neely  MRN: [id number]  Admit Date: 9/20/2020   LOS: 1 day     SUBJECTIVE:     Principal problem: Acute on chronic respiratory failure    Interval History:  Patient was seen and examined bedside.  She remains on BiPAP.  Family is present at bedside and tells me that she has worsening shortness of breath for last 3 days.  No acute events overnight as per nursing staff.  She is about to get her dialysis.     Scheduled Meds:   sodium chloride 0.9%   Intravenous Once    aspirin  81 mg Oral Daily    calcium acetate(phosphat bind)  667 mg Oral Daily    ceFEPime (MAXIPIME) IVPB  2 g Intravenous Q12H    chlorhexidine  15 mL Mouth/Throat BID    diltiaZEM  180 mg Oral Daily    ipratropium  0.5 mg Nebulization Q6H    isosorbide mononitrate  30 mg Oral Daily    levalbuterol  0.63 mg Nebulization Q6H    magnesium oxide  400 mg Oral Daily    metoprolol succinate  25 mg Oral Nightly    mupirocin   Nasal BID    pantoprazole  40 mg Oral Before breakfast    zinc sulfate  220 mg Oral Daily     Continuous Infusions:   dilTIAZem 10 mg/hr (09/21/20 1400)     PRN Meds:sodium chloride 0.9%, lorazepam, sodium chloride 0.9%, traMADoL    Review of patient's allergies indicates:  No Known Allergies    Review of Systems: As per interval history    OBJECTIVE:     Vital Signs (Most Recent)  Temp: 96.8 °F (36 °C) (09/21/20 1101)  Pulse: (!) 120 (09/21/20 1501)  Resp: (!) 23 (09/21/20 1501)  BP: 122/77 (09/21/20 1501)  SpO2: 100 % (09/21/20 1501)    Vital Signs Range (Last 24H):  Temp:  [96.8 °F (36 °C)-97.9 °F (36.6 °C)]   Pulse:  []   Resp:  [20-33]   BP: (104-187)/()   SpO2:  [92 %-100 %]     I & O (Last 24H):No intake or output data in the 24 hours ending 09/21/20 1530    Physical Exam:  General:  Chronically ill-appearing, Patient resting comfortably in no acute distress. Appears as stated age. Calm  Eyes: No conjunctival injection.  No scleral icterus.  ENT: Hearing grossly intact. No discharge from ears. No nasal discharge.   Neck: Supple, trachea midline. No JVD  CVS:  Irregularly irregular, No LE edema BL, murmur+  Lungs: CTA BL, no wheezing or crackles. Good breath sounds. No accessory muscle use. No acute respiratory distress  Abdomen:  Soft, nontender and nondistended.  No organomegaly  Neuro:  Easily aroused, alert however does not like to talk. Moves all extremities. Follows commands. Responds appropriately   Skin:  No rash or erythema noted, chronic skin changes from kidney disease    Laboratory:  CBC:   Recent Labs   Lab 09/21/20  0339   WBC 8.41   RBC 3.49*   HGB 9.6*   HCT 31.5*      MCV 90   MCH 27.5   MCHC 30.5*     CMP:   Recent Labs   Lab 09/21/20  0337   *   CALCIUM 8.8   ALBUMIN 3.4*   PROT 8.2      K 5.2*   CO2 25   CL 95   BUN 92*   CREATININE 6.8*   ALKPHOS 141*   ALT 24   AST 43*   BILITOT 0.9       Diagnostic Results:  Reviewed all imaging    ASSESSMENT/PLAN:     75-year-old lady with numerous medical problems are numerous hospitalization came with chief complaint of shortness of breath and a found to have acute respiratory failure requiring BiPAP.    Active Hospital Problems    Diagnosis  POA    *Acute on chronic respiratory failure [J96.20]  Unknown     3 L O2 at home      Acute on chronic systolic heart failure [I50.9]  Yes    Atrial thrombus [I51.3]  Yes    Long term current use of anticoagulant [Z79.01]  Not Applicable    H/O mitral valve replacement [Z95.2]  Not Applicable     Chronic    DNR (do not resuscitate) [Z66]  Yes     Chronic    ESRD (end stage renal disease) on HD M,W, F [N18.6]  Yes     Chronic    Tobacco dependence [F17.200]  Yes     Chronic    HTN (hypertension) [I10]  Yes     Chronic      Resolved Hospital Problems   No resolved problems to display.         Plan:   Overall presentation is consistent with her prior episodes of hypervolemia, acute on chronic hypoxic  respiratory failure    Consulted Dr. Ingram for dialysis.  I hope after dialysis we can wean her off to regular nasal cannula    Consulted Dr. Andre Oliva, wean BiPAP as tolerated    Check procalcitonin, continue empiric antibiotics    Daily labs including INR    Hold Coumadin tonight due to supratherapeutic INR    Continue current orders      VTE Risk Mitigation (From admission, onward)    None            Department Hospital Medicine  [facility]  Cristian Lopez MD  Date of service: 09/21/2020

## 2020-09-21 NOTE — PLAN OF CARE
09/21/20 0208   Patient Assessment/Suction   Level of Consciousness (AVPU) responds to voice   Respiratory Effort Short of breath;Slight   Expansion/Accessory Muscles/Retractions accessory muscle use   All Lung Fields Breath Sounds wheezes, expiratory;wheezes, inspiratory   Rhythm/Pattern, Respiratory tachypneic   PRE-TX-O2   O2 Device (Oxygen Therapy) BiPAP   $ Is the patient on Low Flow Oxygen? Yes   Oxygen Concentration (%) 40   Pulse Oximetry Type Continuous   $ Pulse Oximetry - Multiple Charge Pulse Oximetry - Multiple   Ready to Wean/Extubation Screen   FIO2<=50 (chart decimal) 0.4   Preset CPAP/BiPAP Settings   Mode Of Delivery BiPAP S/T   $ CPAP/BiPAP Daily Charge BiPAP/CPAP Daily   $ Initial CPAP/BiPAP Setup? Yes   $ Is patient using? Yes   Size of Mask Small/Medium   Sized Appropriately? Yes   Equipment Type V60   Airway Device Type medium full face mask   Ipap 14   EPAP (cm H2O) 6   Pressure Support (cm H2O) 8   Patient CPAP/BiPAP Settings   RR Total (Breaths/Min) 30   Tidal Volume (mL) 467   VE Minute Ventilation (L/min) 10.6 L/min   Peak Inspiratory Pressure (cm H2O) 14   TiTOT (%) 35   Total Leak (L/Min) 0   Patient Trigger - ST Mode Only (%) 100   Education   $ Education BiPAP   Respiratory Evaluation   $ Care Plan Tech Time 15 min   Evaluation For New Orders

## 2020-09-22 ENCOUNTER — DOCUMENT SCAN (OUTPATIENT)
Dept: HOME HEALTH SERVICES | Facility: HOSPITAL | Age: 75
End: 2020-09-22

## 2020-09-22 PROBLEM — I50.23 ACUTE ON CHRONIC SYSTOLIC CHF (CONGESTIVE HEART FAILURE): Status: ACTIVE | Noted: 2020-09-22

## 2020-09-22 LAB
ALBUMIN SERPL BCP-MCNC: 3 G/DL (ref 3.5–5.2)
ANION GAP SERPL CALC-SCNC: 20 MMOL/L (ref 8–16)
BASOPHILS # BLD AUTO: 0.02 K/UL (ref 0–0.2)
BASOPHILS NFR BLD: 0.2 % (ref 0–1.9)
BUN SERPL-MCNC: 87 MG/DL (ref 8–23)
CALCIUM SERPL-MCNC: 8.8 MG/DL (ref 8.7–10.5)
CHLORIDE SERPL-SCNC: 95 MMOL/L (ref 95–110)
CO2 SERPL-SCNC: 22 MMOL/L (ref 23–29)
CREAT SERPL-MCNC: 5.5 MG/DL (ref 0.5–1.4)
DIFFERENTIAL METHOD: ABNORMAL
EOSINOPHIL # BLD AUTO: 0 K/UL (ref 0–0.5)
EOSINOPHIL NFR BLD: 0 % (ref 0–8)
ERYTHROCYTE [DISTWIDTH] IN BLOOD BY AUTOMATED COUNT: 18 % (ref 11.5–14.5)
EST. GFR  (AFRICAN AMERICAN): 8.2 ML/MIN/1.73 M^2
EST. GFR  (NON AFRICAN AMERICAN): 7.1 ML/MIN/1.73 M^2
GLUCOSE SERPL-MCNC: 155 MG/DL (ref 70–110)
HCT VFR BLD AUTO: 27.2 % (ref 37–48.5)
HGB BLD-MCNC: 8.2 G/DL (ref 12–16)
IMM GRANULOCYTES # BLD AUTO: 0.05 K/UL (ref 0–0.04)
IMM GRANULOCYTES NFR BLD AUTO: 0.4 % (ref 0–0.5)
INR PPP: 3.1
LYMPHOCYTES # BLD AUTO: 0.8 K/UL (ref 1–4.8)
LYMPHOCYTES NFR BLD: 6.5 % (ref 18–48)
MAGNESIUM SERPL-MCNC: 1.9 MG/DL (ref 1.6–2.6)
MCH RBC QN AUTO: 27.6 PG (ref 27–31)
MCHC RBC AUTO-ENTMCNC: 30.1 G/DL (ref 32–36)
MCV RBC AUTO: 92 FL (ref 82–98)
MONOCYTES # BLD AUTO: 0.9 K/UL (ref 0.3–1)
MONOCYTES NFR BLD: 7.4 % (ref 4–15)
NEUTROPHILS # BLD AUTO: 9.8 K/UL (ref 1.8–7.7)
NEUTROPHILS NFR BLD: 85.5 % (ref 38–73)
NRBC BLD-RTO: 0 /100 WBC
PHOSPHATE SERPL-MCNC: 3.2 MG/DL (ref 2.7–4.5)
PLATELET # BLD AUTO: 268 K/UL (ref 150–350)
PMV BLD AUTO: 10.9 FL (ref 9.2–12.9)
POTASSIUM SERPL-SCNC: 4.5 MMOL/L (ref 3.5–5.1)
PROCALCITONIN SERPL IA-MCNC: 0.39 NG/ML (ref 0–0.5)
PROTHROMBIN TIME: 30.4 SEC (ref 10.6–14.8)
RBC # BLD AUTO: 2.97 M/UL (ref 4–5.4)
SODIUM SERPL-SCNC: 137 MMOL/L (ref 136–145)
WBC # BLD AUTO: 11.51 K/UL (ref 3.9–12.7)

## 2020-09-22 PROCEDURE — 94761 N-INVAS EAR/PLS OXIMETRY MLT: CPT

## 2020-09-22 PROCEDURE — 80069 RENAL FUNCTION PANEL: CPT

## 2020-09-22 PROCEDURE — 85025 COMPLETE CBC W/AUTO DIFF WBC: CPT

## 2020-09-22 PROCEDURE — 99232 PR SUBSEQUENT HOSPITAL CARE,LEVL II: ICD-10-PCS | Mod: ,,, | Performed by: INTERNAL MEDICINE

## 2020-09-22 PROCEDURE — 27000221 HC OXYGEN, UP TO 24 HOURS

## 2020-09-22 PROCEDURE — 94640 AIRWAY INHALATION TREATMENT: CPT

## 2020-09-22 PROCEDURE — 63600175 PHARM REV CODE 636 W HCPCS: Performed by: NURSE PRACTITIONER

## 2020-09-22 PROCEDURE — 94660 CPAP INITIATION&MGMT: CPT

## 2020-09-22 PROCEDURE — 36415 COLL VENOUS BLD VENIPUNCTURE: CPT

## 2020-09-22 PROCEDURE — 99900035 HC TECH TIME PER 15 MIN (STAT)

## 2020-09-22 PROCEDURE — 85610 PROTHROMBIN TIME: CPT

## 2020-09-22 PROCEDURE — 25000003 PHARM REV CODE 250: Performed by: NURSE PRACTITIONER

## 2020-09-22 PROCEDURE — 84145 PROCALCITONIN (PCT): CPT

## 2020-09-22 PROCEDURE — 63600175 PHARM REV CODE 636 W HCPCS: Performed by: EMERGENCY MEDICINE

## 2020-09-22 PROCEDURE — 99232 SBSQ HOSP IP/OBS MODERATE 35: CPT | Mod: ,,, | Performed by: INTERNAL MEDICINE

## 2020-09-22 PROCEDURE — 25000003 PHARM REV CODE 250

## 2020-09-22 PROCEDURE — 83735 ASSAY OF MAGNESIUM: CPT

## 2020-09-22 PROCEDURE — 25000242 PHARM REV CODE 250 ALT 637 W/ HCPCS: Performed by: NURSE PRACTITIONER

## 2020-09-22 PROCEDURE — 21400001 HC TELEMETRY ROOM

## 2020-09-22 RX ORDER — SODIUM CHLORIDE 9 MG/ML
INJECTION, SOLUTION INTRAVENOUS
Status: DISCONTINUED | OUTPATIENT
Start: 2020-09-22 | End: 2020-09-23 | Stop reason: HOSPADM

## 2020-09-22 RX ORDER — SODIUM CHLORIDE 9 MG/ML
INJECTION, SOLUTION INTRAVENOUS ONCE
Status: DISCONTINUED | OUTPATIENT
Start: 2020-09-22 | End: 2020-09-23 | Stop reason: HOSPADM

## 2020-09-22 RX ADMIN — LEVALBUTEROL HYDROCHLORIDE 0.63 MG: 0.63 SOLUTION RESPIRATORY (INHALATION) at 08:09

## 2020-09-22 RX ADMIN — LORAZEPAM 0.5 MG: 2 INJECTION INTRAMUSCULAR; INTRAVENOUS at 12:09

## 2020-09-22 RX ADMIN — CEFEPIME HYDROCHLORIDE 2 G: 2 INJECTION, SOLUTION INTRAVENOUS at 11:09

## 2020-09-22 RX ADMIN — LORAZEPAM 1 MG: 2 INJECTION INTRAMUSCULAR; INTRAVENOUS at 09:09

## 2020-09-22 RX ADMIN — DILTIAZEM HYDROCHLORIDE 180 MG: 180 CAPSULE, COATED, EXTENDED RELEASE ORAL at 08:09

## 2020-09-22 RX ADMIN — CALCIUM ACETATE 667 MG: 667 CAPSULE ORAL at 08:09

## 2020-09-22 RX ADMIN — MAGNESIUM OXIDE 400 MG: 400 TABLET ORAL at 08:09

## 2020-09-22 RX ADMIN — MUPIROCIN: 20 OINTMENT TOPICAL at 08:09

## 2020-09-22 RX ADMIN — ASPIRIN 81 MG: 81 TABLET, COATED ORAL at 09:09

## 2020-09-22 RX ADMIN — IPRATROPIUM BROMIDE 0.5 MG: 0.5 SOLUTION RESPIRATORY (INHALATION) at 08:09

## 2020-09-22 RX ADMIN — ZINC SULFATE 220 MG (50 MG) CAPSULE 220 MG: CAPSULE at 08:09

## 2020-09-22 RX ADMIN — METOPROLOL SUCCINATE 25 MG: 25 TABLET, FILM COATED, EXTENDED RELEASE ORAL at 08:09

## 2020-09-22 RX ADMIN — ISOSORBIDE MONONITRATE 30 MG: 30 TABLET, EXTENDED RELEASE ORAL at 08:09

## 2020-09-22 RX ADMIN — CHLORHEXIDINE GLUCONATE 15 ML: 1.2 RINSE ORAL at 08:09

## 2020-09-22 RX ADMIN — DILTIAZEM HYDROCHLORIDE 5 MG/HR: 5 INJECTION INTRAVENOUS at 04:09

## 2020-09-22 NOTE — PLAN OF CARE
09/21/20 1900   Patient Assessment/Suction   Level of Consciousness (AVPU) alert   Respiratory Effort Mild;Short of breath   Expansion/Accessory Muscles/Retractions no use of accessory muscles   All Lung Fields Breath Sounds equal bilaterally;diminished;crackles, fine   Rhythm/Pattern, Respiratory unlabored   Cough Frequency no cough   Cough Type weak;nonproductive   PRE-TX-O2   O2 Device (Oxygen Therapy) nasal cannula   Flow (L/min) 5   Pulse Oximetry Type Continuous   $ Pulse Oximetry - Multiple Charge Pulse Oximetry - Multiple   Aerosol Therapy   $ Aerosol Therapy Charges Refused   Education   $ Education Bronchodilator;15 min   Respiratory Evaluation   $ Care Plan Tech Time 15 min   Evaluation For New Orders   Pt refusing aerosol tx and BiPAP.

## 2020-09-22 NOTE — PLAN OF CARE
Pt had dialysis and 3 liters removed.  She feeds self.  She is unsteady on feet and requires min assist w transfers.

## 2020-09-22 NOTE — PROGRESS NOTES
[facility]  Nephrology  Progress Note    Patient Name: Griselda Neely  MRN: [id number]  Admission Date: 9/20/2020  Hospital Length of Stay: 2 days  Attending Provider: Cristian Lopez MD   Primary Care Physician: Kalie Neely MD  Principal Problem:Acute on chronic respiratory failure    Consults  Subjective:     Interval History:   Sleeping; easily arousal; feeling better today  Sob improved-mental clearing; HD again today  No acute events overnight    Review of patient's allergies indicates:  No Known Allergies  Current Facility-Administered Medications   Medication Frequency    0.9%  NaCl infusion PRN    0.9%  NaCl infusion Once    0.9%  NaCl infusion PRN    0.9%  NaCl infusion Once    aspirin EC tablet 81 mg Daily    calcium acetate(phosphat bind) capsule 667 mg Daily    cefepime in dextrose 5 % IVPB 2 g Q12H    chlorhexidine 0.12 % solution 15 mL BID    diltiaZEM 125 mg in D5W 125 mL infusion Continuous    diltiaZEM 24 hr capsule 180 mg Daily    ipratropium 0.02 % nebulizer solution 0.5 mg Q6H    isosorbide mononitrate 24 hr tablet 30 mg Daily    levalbuterol nebulizer solution 0.63 mg Q6H    lorazepam injection 1 mg Q4H PRN    magnesium oxide tablet 400 mg Daily    metoprolol succinate (TOPROL-XL) 24 hr tablet 25 mg Nightly    mupirocin 2 % ointment BID    pantoprazole EC tablet 40 mg Before breakfast    sodium chloride 0.9% flush 10 mL PRN    traMADoL tablet 50 mg Q8H PRN    zinc sulfate capsule 220 mg Daily       Objective:     Vital Signs (Most Recent):  Temp: 97.7 °F (36.5 °C) (09/22/20 1201)  Pulse: 85 (09/22/20 1201)  Resp: (!) 25 (09/22/20 1001)  BP: 132/77 (09/22/20 1201)  SpO2: (!) 90 % (09/22/20 1201)  O2 Device (Oxygen Therapy): nasal cannula (09/22/20 1101) Vital Signs (24h Range):  Temp:  [97 °F (36.1 °C)-98.1 °F (36.7 °C)] 97.7 °F (36.5 °C)  Pulse:  [] 85  Resp:  [21-29] 25  SpO2:  [83 %-100 %] 90 %  BP: (104-187)/() 132/77     Weight: 58 kg  (127 lb 13.9 oz) (09/21/20 0701)  Body mass index is 21.28 kg/m².  Body surface area is 1.63 meters squared.    I/O last 3 completed shifts:  In: 1444 [P.O.:550; I.V.:244; Other:500; IV Piggyback:150]  Out: 4500 [Other:4500]    Physical Exam     Physical Exam  Constitutional:       Appearance: She is ill-appearing.   HENT:      Head: Normocephalic and atraumatic.      Nose: Nose normal.      Mouth/Throat:      Mouth: Mucous membranes are moist.   Eyes:      Extraocular Movements: Extraocular movements intact.      Pupils: Pupils are equal, round, and reactive to light.   Neck:      Comments: No JVD  Cardiovascular:      Rate and Rhythm: Tachycardia present. Rhythm irregular.      Comments: A-fib RVR  Pulmonary:      Breath sounds: No stridor. Wheezing present. No rhonchi.      Comments: Diminished; fine crackles  Abdominal:      General: There is no distension.      Palpations: Abdomen is soft.      Tenderness: There is no abdominal tenderness.   Musculoskeletal:         General: No swelling.      Right lower leg: No edema.      Left lower leg: No edema.   Skin:     General: Skin is warm and dry.      Comments: AVF right arm +bruit/thrill   Neurological:      Comments: Sleepy/fatigued; PA at this time             Significant Labs:sure  ABGs:   Recent Labs   Lab 09/21/20  0006   PH 7.398   PCO2 47.1*   HCO3 29.1*   POCSATURATED 79*   BE 4     BMP:   Recent Labs   Lab 09/22/20  0948   *   CL 95   CO2 22*   BUN 87*   CREATININE 5.5*   CALCIUM 8.8   MG 1.9     Cardiac Markers: No results for input(s): CKMB, TROPONINT, MYOGLOBIN in the last 168 hours.  CBC:   Recent Labs   Lab 09/22/20  0948   WBC 11.51   RBC 2.97*   HGB 8.2*   HCT 27.2*      MCV 92   MCH 27.6   MCHC 30.1*     CMP:   Recent Labs   Lab 09/21/20  0337 09/22/20  0948   * 155*   CALCIUM 8.8 8.8   ALBUMIN 3.4* 3.0*   PROT 8.2  --     137   K 5.2* 4.5   CO2 25 22*   CL 95 95   BUN 92* 87*   CREATININE 6.8* 5.5*   ALKPHOS 141*  --     ALT 24  --    AST 43*  --    BILITOT 0.9  --      Coagulation:   Recent Labs   Lab 09/22/20 0948   INR 3.1     LFTs:   Recent Labs   Lab 09/21/20 0337 09/22/20 0948   ALT 24  --    AST 43*  --    ALKPHOS 141*  --    BILITOT 0.9  --    PROT 8.2  --    ALBUMIN 3.4* 3.0*     Microbiology Results (last 7 days)     Procedure Component Value Units Date/Time    Blood culture #1 **CANNOT BE ORDERED STAT** [[id number]] Collected: 09/20/20 2200    Order Status: Completed Specimen: Blood from Peripheral, Antecubital, Left Updated: 09/22/20 0232     Blood Culture, Routine No Growth to date      No Growth to date    Blood culture #2 **CANNOT BE ORDERED STAT** [[id number]] Collected: 09/20/20 2215    Order Status: Completed Specimen: Blood from Peripheral, Antecubital, Left Updated: 09/22/20 0232     Blood Culture, Routine No Growth to date      No Growth to date        Specimen (12h ago, onward)    None        PTH: No results for input(s): PTH in the last 168 hours.  TSH:   Recent Labs   Lab 09/21/20 0337   TSH 3.210     No results for input(s): COLORU, CLARITYU, SPECGRAV, PHUR, PROTEINUA, GLUCOSEU, BILIRUBINCON, BLOODU, WBCU, RBCU, BACTERIA, MUCUS, NITRITE, LEUKOCYTESUR, UROBILINOGEN, HYALINECASTS in the last 168 hours.  All labs within the past 24 hours have been reviewed.    Significant Imaging:      Assessment/Plan:     Active Diagnoses:    Diagnosis Date Noted POA    PRINCIPAL PROBLEM:  Acute on chronic respiratory failure [J96.20] 05/13/2019 Unknown    Acute on chronic systolic CHF (congestive heart failure) [I50.23] 09/22/2020 Yes    Acute on chronic systolic heart failure [I50.9] 09/20/2020 Yes    Atrial thrombus [I51.3] 09/05/2020 Yes    Long term current use of anticoagulant [Z79.01] 09/05/2020 Not Applicable    H/O mitral valve replacement [Z95.2] 03/17/2020 Not Applicable     Chronic    DNR (do not resuscitate) [Z66] 09/30/2019 Yes     Chronic    ESRD (end stage renal disease) on HD M,W, F [N18.6]  05/11/2016 Yes     Chronic    Tobacco dependence [F17.200] 09/09/2013 Yes     Chronic    HTN (hypertension) [I10] 08/15/2013 Yes     Chronic      Problems Resolved During this Admission:       Assessment and Plan:     ESRD:  -M-W-F schedule  -CRS- hypervolemia; clinically improved today; plan for HD today  -renal chemistries stable  -high protein/ low K diet  -strict I&O  -Daily weight  -Renal dose all meds/appropriate GFR  -No BP/IV stick right arm  -Anuric     Acute on chronic HF:  -elevated BNP  -last echo 1/2020; 25%  -CXR- revealing small right-sided effusion and trace amount of left-sided pleural  Fluid.  -breathing improved today  -Cardiology consult noted and following     Atrial Fibrillation with RVR:  -off Cardizem gtt  -on coumadin     Acute on chronic Resp.Failure:  -nebulizers  -O2; titrate  -pulmonology consulting     Anemia:  -below goal 10-12  -monitor closley     Renal BMD:  -Ca and Phos ok  -may hold binders     Hypoalbuminemia:  -3.4g/dL  -maximize nutrition     Hyperkalemia:  -improved today  -titrate bath with HD        Gabe Barksdale NP  Nephrology  [facility]

## 2020-09-22 NOTE — HPI
"As per admitting provider's note:    "Griselda Neely is a 75 y.o. 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 The patient presented to [facility] on 9/20/2020 with a primary complaint of Shortness of Breath (onset today with worsening of SOB- reports hx of COPD. Home O2in use @ 4L/NC during triage)  .   75 year old  female with a known history end-stage renal disease requiring hemodialysis 3 times a week, hypertension, atrial fibrillation, atrial thrombus, calciphylaxis, hyperlipidemia presents to the emergency room with complaints of shortness of breath.       The patient also states she a history of COPD is on oxygen at 4 L a minute at home.       The patient presents with complaints of worsening shortness of breath over the last few days.  She endorses a productive cough that is clear in color and thick in consistency.  She denies fever, chills, hematemesis or hemoptysis.       She states that she has been compliant with her hemodialysis session and her last hemodialysis was on Friday.  She normally dialyzed on Monday Wednesdays and Fridays.  She dialyzed for her full time.     She is currently being treated for left upper thigh wound that is healing well."  "

## 2020-09-22 NOTE — PROGRESS NOTES
[facility]  Progress Note    Patient name: Griselda Neely  MRN: [id number]  Admit Date: 9/20/2020   LOS: 2 days     SUBJECTIVE:     Principal problem: Acute on chronic respiratory failure    Interval History:  Patient was seen and examined bedside.  She improved significantly after dialysis(3.7 L was removed) yesterday.  Currently she is sitting in the chair breathing comfortably on 2-4 L oxygen.  She is alert, oriented x3 and is very cooperative with physical exam and interview.   No acute events overnight as per nursing staff.  She is about to get short run of dialysis soon    Scheduled Meds:   sodium chloride 0.9%   Intravenous Once    aspirin  81 mg Oral Daily    calcium acetate(phosphat bind)  667 mg Oral Daily    ceFEPime (MAXIPIME) IVPB  2 g Intravenous Q12H    chlorhexidine  15 mL Mouth/Throat BID    diltiaZEM  180 mg Oral Daily    ipratropium  0.5 mg Nebulization Q6H    isosorbide mononitrate  30 mg Oral Daily    levalbuterol  0.63 mg Nebulization Q6H    magnesium oxide  400 mg Oral Daily    metoprolol succinate  25 mg Oral Nightly    mupirocin   Nasal BID    pantoprazole  40 mg Oral Before breakfast    zinc sulfate  220 mg Oral Daily     Continuous Infusions:   dilTIAZem Stopped (09/22/20 0801)     PRN Meds:sodium chloride 0.9%, lorazepam, sodium chloride 0.9%, traMADoL    Review of patient's allergies indicates:  No Known Allergies    Review of Systems: As per interval history    OBJECTIVE:     Vital Signs (Most Recent)  Temp: 98.1 °F (36.7 °C) (09/22/20 0701)  Pulse: 93 (09/22/20 0901)  Resp: (!) 23 (09/22/20 0901)  BP: (!) 162/94 (09/22/20 0901)  SpO2: 100 % (09/22/20 0901)    Vital Signs Range (Last 24H):  Temp:  [96.8 °F (36 °C)-98.1 °F (36.7 °C)]   Pulse:  []   Resp:  [21-32]   BP: (104-187)/()   SpO2:  [83 %-100 %]     I & O (Last 24H):    Intake/Output Summary (Last 24 hours) at 9/22/2020 0947  Last data filed at 9/22/2020 0801  Gross per  24 hour   Intake 1579 ml   Output 4500 ml   Net -2921 ml       Physical Exam:  General: Chronically ill-appearing, Patient resting comfortably in no acute distress. Appears as stated age. Calm  Eyes: No conjunctival injection. No scleral icterus.  ENT: Hearing grossly intact. No discharge from ears. No nasal discharge.   Neck: Supple, trachea midline. No JVD  CVS:  Irregularly irregular, No LE edema BL, murmur+  Lungs: CTA BL, no wheezing or crackles. Good breath sounds. No accessory muscle use. No acute respiratory distress, on nasal cannula  Abdomen:  Soft, nontender and nondistended.  No organomegaly  Neuro:  Easily aroused, alert however does not like to talk. Moves all extremities. Follows commands. Responds appropriately   Skin: No rash or erythema noted, chronic skin changes from kidney disease    Laboratory:  CBC:   Recent Labs   Lab 09/21/20  0339   WBC 8.41   RBC 3.49*   HGB 9.6*   HCT 31.5*      MCV 90   MCH 27.5   MCHC 30.5*     CMP:   Recent Labs   Lab 09/21/20  0337   *   CALCIUM 8.8   ALBUMIN 3.4*   PROT 8.2      K 5.2*   CO2 25   CL 95   BUN 92*   CREATININE 6.8*   ALKPHOS 141*   ALT 24   AST 43*   BILITOT 0.9       Diagnostic Results:  Reviewed all imaging    ASSESSMENT/PLAN:     75-year-old lady with numerous medical problems are numerous hospitalization came with chief complaint of shortness of breath and a found to have acute respiratory failure requiring BiPAP.    Active Hospital Problems    Diagnosis  POA    *Acute on chronic respiratory failure [J96.20]  Unknown     3 L O2 at home      Acute on chronic systolic heart failure [I50.9]  Yes    Atrial thrombus [I51.3]  Yes    Long term current use of anticoagulant [Z79.01]  Not Applicable    H/O mitral valve replacement [Z95.2]  Not Applicable     Chronic    DNR (do not resuscitate) [Z66]  Yes     Chronic    ESRD (end stage renal disease) on HD M,W, F [N18.6]  Yes     Chronic    Tobacco dependence [F17.200]  Yes      Chronic    HTN (hypertension) [I10]  Yes     Chronic      Resolved Hospital Problems   No resolved problems to display.         Plan:   Overall presentation is consistent with her prior episodes of hypervolemia, acute on chronic hypoxic respiratory failure.  Significantly improved with 1 session of dialysis    Consulted Dr. Ingram for dialysis.  3.7 L fluid was removed yesterday, another short run of dialysis is planned today    Consulted Dr. Powell-off Cardizem drip now, on p.o. meds    Nebs, wean BiPAP as tolerated    Check procalcitonin, continue empiric antibiotics    Daily labs including INR    Hold Coumadin tonight due to supratherapeutic INR    Continue current orders    Transfer out of ICU    VTE Risk Mitigation (From admission, onward)    None            Department Hospital Medicine  [facility]  Cristian Lopez MD  Date of service: 09/22/2020

## 2020-09-22 NOTE — SUBJECTIVE & OBJECTIVE
Interval History: discharged    Review of Systems   Constitutional: Positive for diaphoresis, fatigue and fever.   HENT: Negative.    Eyes: Negative.    Respiratory: Positive for chest tightness and shortness of breath.    Cardiovascular: Negative.      Objective:     Vital Signs (Most Recent):  Temp: 97.3 °F (36.3 °C) (09/21/20 2300)  Pulse: 98 (09/22/20 0100)  Resp: (!) 23 (09/22/20 0100)  BP: (!) 153/109 (09/22/20 0100)  SpO2: 100 % (09/22/20 0100) Vital Signs (24h Range):  Temp:  [96.8 °F (36 °C)-97.8 °F (36.6 °C)] 97.3 °F (36.3 °C)  Pulse:  [] 98  Resp:  [21-33] 23  SpO2:  [83 %-100 %] 100 %  BP: (104-187)/() 153/109     Weight: 58 kg (127 lb 13.9 oz)  Body mass index is 21.28 kg/m².    Intake/Output Summary (Last 24 hours) at 9/22/2020 0408  Last data filed at 9/22/2020 0000  Gross per 24 hour   Intake 1220 ml   Output 4500 ml   Net -3280 ml      Physical Exam    Significant Labs: All pertinent labs within the past 24 hours have been reviewed.    Significant Imaging: Reviewed all imaging

## 2020-09-23 VITALS
OXYGEN SATURATION: 100 % | RESPIRATION RATE: 20 BRPM | DIASTOLIC BLOOD PRESSURE: 95 MMHG | HEART RATE: 90 BPM | TEMPERATURE: 98 F | BODY MASS INDEX: 20.42 KG/M2 | SYSTOLIC BLOOD PRESSURE: 161 MMHG | HEIGHT: 65 IN | WEIGHT: 122.56 LBS

## 2020-09-23 PROBLEM — I50.23 ACUTE ON CHRONIC SYSTOLIC CHF (CONGESTIVE HEART FAILURE): Status: RESOLVED | Noted: 2020-09-22 | Resolved: 2020-09-23

## 2020-09-23 LAB
ALBUMIN SERPL BCP-MCNC: 2.9 G/DL (ref 3.5–5.2)
ANION GAP SERPL CALC-SCNC: 16 MMOL/L (ref 8–16)
BASOPHILS # BLD AUTO: 0.02 K/UL (ref 0–0.2)
BASOPHILS NFR BLD: 0.2 % (ref 0–1.9)
BUN SERPL-MCNC: 103 MG/DL (ref 8–23)
CALCIUM SERPL-MCNC: 8.5 MG/DL (ref 8.7–10.5)
CHLORIDE SERPL-SCNC: 98 MMOL/L (ref 95–110)
CO2 SERPL-SCNC: 23 MMOL/L (ref 23–29)
CREAT SERPL-MCNC: 6.5 MG/DL (ref 0.5–1.4)
DIFFERENTIAL METHOD: ABNORMAL
EOSINOPHIL # BLD AUTO: 0.1 K/UL (ref 0–0.5)
EOSINOPHIL NFR BLD: 0.8 % (ref 0–8)
ERYTHROCYTE [DISTWIDTH] IN BLOOD BY AUTOMATED COUNT: 18.6 % (ref 11.5–14.5)
EST. GFR  (AFRICAN AMERICAN): 6.7 ML/MIN/1.73 M^2
EST. GFR  (NON AFRICAN AMERICAN): 5.8 ML/MIN/1.73 M^2
GLUCOSE SERPL-MCNC: 83 MG/DL (ref 70–110)
HCT VFR BLD AUTO: 28.9 % (ref 37–48.5)
HGB BLD-MCNC: 8.8 G/DL (ref 12–16)
IMM GRANULOCYTES # BLD AUTO: 0.05 K/UL (ref 0–0.04)
IMM GRANULOCYTES NFR BLD AUTO: 0.5 % (ref 0–0.5)
LYMPHOCYTES # BLD AUTO: 1 K/UL (ref 1–4.8)
LYMPHOCYTES NFR BLD: 11 % (ref 18–48)
MCH RBC QN AUTO: 27.8 PG (ref 27–31)
MCHC RBC AUTO-ENTMCNC: 30.4 G/DL (ref 32–36)
MCV RBC AUTO: 91 FL (ref 82–98)
MONOCYTES # BLD AUTO: 0.8 K/UL (ref 0.3–1)
MONOCYTES NFR BLD: 8.1 % (ref 4–15)
NEUTROPHILS # BLD AUTO: 7.3 K/UL (ref 1.8–7.7)
NEUTROPHILS NFR BLD: 79.4 % (ref 38–73)
NRBC BLD-RTO: 0 /100 WBC
PHOSPHATE SERPL-MCNC: 2.7 MG/DL (ref 2.7–4.5)
PLATELET # BLD AUTO: 273 K/UL (ref 150–350)
PMV BLD AUTO: 10.8 FL (ref 9.2–12.9)
POTASSIUM SERPL-SCNC: 4.4 MMOL/L (ref 3.5–5.1)
RBC # BLD AUTO: 3.17 M/UL (ref 4–5.4)
SODIUM SERPL-SCNC: 137 MMOL/L (ref 136–145)
WBC # BLD AUTO: 9.24 K/UL (ref 3.9–12.7)

## 2020-09-23 PROCEDURE — 94761 N-INVAS EAR/PLS OXIMETRY MLT: CPT

## 2020-09-23 PROCEDURE — 36415 COLL VENOUS BLD VENIPUNCTURE: CPT

## 2020-09-23 PROCEDURE — 27000221 HC OXYGEN, UP TO 24 HOURS

## 2020-09-23 PROCEDURE — 94660 CPAP INITIATION&MGMT: CPT

## 2020-09-23 PROCEDURE — 25000242 PHARM REV CODE 250 ALT 637 W/ HCPCS: Performed by: NURSE PRACTITIONER

## 2020-09-23 PROCEDURE — 90935 HEMODIALYSIS ONE EVALUATION: CPT

## 2020-09-23 PROCEDURE — 25000003 PHARM REV CODE 250: Performed by: INTERNAL MEDICINE

## 2020-09-23 PROCEDURE — 25000003 PHARM REV CODE 250: Performed by: NURSE PRACTITIONER

## 2020-09-23 PROCEDURE — 63600175 PHARM REV CODE 636 W HCPCS: Performed by: NURSE PRACTITIONER

## 2020-09-23 PROCEDURE — 25000003 PHARM REV CODE 250

## 2020-09-23 PROCEDURE — 85025 COMPLETE CBC W/AUTO DIFF WBC: CPT

## 2020-09-23 PROCEDURE — 94640 AIRWAY INHALATION TREATMENT: CPT

## 2020-09-23 PROCEDURE — 99900035 HC TECH TIME PER 15 MIN (STAT)

## 2020-09-23 PROCEDURE — 99232 PR SUBSEQUENT HOSPITAL CARE,LEVL II: ICD-10-PCS | Mod: ,,, | Performed by: INTERNAL MEDICINE

## 2020-09-23 PROCEDURE — 80069 RENAL FUNCTION PANEL: CPT

## 2020-09-23 PROCEDURE — 99232 SBSQ HOSP IP/OBS MODERATE 35: CPT | Mod: ,,, | Performed by: INTERNAL MEDICINE

## 2020-09-23 RX ORDER — SODIUM THIOSULFATE 250 MG/ML
25 INJECTION, SOLUTION INTRAVENOUS
Status: DISCONTINUED | OUTPATIENT
Start: 2020-09-23 | End: 2020-09-23 | Stop reason: HOSPADM

## 2020-09-23 RX ORDER — LEVALBUTEROL INHALATION SOLUTION 0.63 MG/3ML
0.63 SOLUTION RESPIRATORY (INHALATION) EVERY 6 HOURS
Status: DISCONTINUED | OUTPATIENT
Start: 2020-09-23 | End: 2020-09-23 | Stop reason: HOSPADM

## 2020-09-23 RX ADMIN — DILTIAZEM HYDROCHLORIDE 180 MG: 180 CAPSULE, COATED, EXTENDED RELEASE ORAL at 08:09

## 2020-09-23 RX ADMIN — PANTOPRAZOLE SODIUM 40 MG: 40 TABLET, DELAYED RELEASE ORAL at 05:09

## 2020-09-23 RX ADMIN — ISOSORBIDE MONONITRATE 30 MG: 30 TABLET, EXTENDED RELEASE ORAL at 08:09

## 2020-09-23 RX ADMIN — IPRATROPIUM BROMIDE 0.5 MG: 0.5 SOLUTION RESPIRATORY (INHALATION) at 06:09

## 2020-09-23 RX ADMIN — CALCIUM ACETATE 667 MG: 667 CAPSULE ORAL at 08:09

## 2020-09-23 RX ADMIN — IPRATROPIUM BROMIDE 0.5 MG: 0.5 SOLUTION RESPIRATORY (INHALATION) at 02:09

## 2020-09-23 RX ADMIN — MAGNESIUM OXIDE 400 MG: 400 TABLET ORAL at 09:09

## 2020-09-23 RX ADMIN — CHLORHEXIDINE GLUCONATE 15 ML: 1.2 RINSE ORAL at 08:09

## 2020-09-23 RX ADMIN — SODIUM THIOSULFATE 25 G: 250 INJECTION, SOLUTION INTRAVENOUS at 03:09

## 2020-09-23 RX ADMIN — LEVALBUTEROL HYDROCHLORIDE 0.63 MG: 0.63 SOLUTION RESPIRATORY (INHALATION) at 02:09

## 2020-09-23 RX ADMIN — LEVALBUTEROL HYDROCHLORIDE 0.63 MG: 0.63 SOLUTION RESPIRATORY (INHALATION) at 06:09

## 2020-09-23 RX ADMIN — ASPIRIN 81 MG: 81 TABLET, COATED ORAL at 08:09

## 2020-09-23 RX ADMIN — MUPIROCIN: 20 OINTMENT TOPICAL at 08:09

## 2020-09-23 RX ADMIN — ZINC SULFATE 220 MG (50 MG) CAPSULE 220 MG: CAPSULE at 08:09

## 2020-09-23 RX ADMIN — LORAZEPAM 1 MG: 2 INJECTION INTRAMUSCULAR; INTRAVENOUS at 04:09

## 2020-09-23 NOTE — DISCHARGE INSTRUCTIONS
Pt diacharged to home with sister.  Pt discharged by wheelchair with oxygen.  Pt discharge instructions given to sister.

## 2020-09-23 NOTE — PLAN OF CARE
09/23/20 1721   Medicare Message   Important Message from Medicare regarding Discharge Appeal Rights Given to patient/caregiver;Explained to patient/caregiver;Signed/date by patient/caregiver   Date IMM was signed 09/23/20   Time IMM was signed [id number]

## 2020-09-23 NOTE — CARE UPDATE
09/22/20 2020   Patient Assessment/Suction   Level of Consciousness (AVPU) alert   Respiratory Effort Unlabored   Expansion/Accessory Muscles/Retractions no use of accessory muscles   All Lung Fields Breath Sounds clear;coarse   Rhythm/Pattern, Respiratory no shortness of breath reported   Cough Frequency infrequent   Cough Type no productive sputum   PRE-TX-O2   O2 Device (Oxygen Therapy) nasal cannula   $ Is the patient on Low Flow Oxygen? Yes   Flow (L/min) 3   SpO2 97 %   Pulse Oximetry Type Continuous   $ Pulse Oximetry - Multiple Charge Pulse Oximetry - Multiple   Pulse 107   Resp 15   Positioning   Head of Bed (HOB) HOB elevated   Aerosol Therapy   $ Aerosol Therapy Charges Aerosol Treatment   Daily Review of Necessity (SVN) completed   Respiratory Treatment Status (SVN) given   Treatment Route (SVN) mask   Patient Position (SVN) HOB elevated   Post Treatment Assessment (SVN) breath sounds improved   Signs of Intolerance (SVN) none   Breath Sounds Post-Respiratory Treatment   Throughout All Fields Post-Treatment All Fields   Throughout All Fields Post-Treatment aeration increased   Post-treatment Heart Rate (beats/min) 110   Post-treatment Resp Rate (breaths/min) 99   Respiratory Evaluation   $ Care Plan Tech Time 15 min

## 2020-09-23 NOTE — PLAN OF CARE
09/23/20 0648   Patient Assessment/Suction   Level of Consciousness (AVPU) alert   Respiratory Effort Normal;Unlabored   Expansion/Accessory Muscles/Retractions no use of accessory muscles   All Lung Fields Breath Sounds coarse   STEPHANIE Breath Sounds crackles   LLL Breath Sounds crackles   RUL Breath Sounds crackles   RML Breath Sounds crackles   RLL Breath Sounds coarse   Rhythm/Pattern, Respiratory pattern regular   Cough Frequency infrequent   Cough Type nonproductive   PRE-TX-O2   O2 Device (Oxygen Therapy) nasal cannula   $ Is the patient on Low Flow Oxygen? Yes   Flow (L/min) 3   Oxygen Concentration (%) 32   SpO2 97 %   Pulse Oximetry Type Continuous   $ Pulse Oximetry - Multiple Charge Pulse Oximetry - Multiple   Pulse 78   Resp 20   Aerosol Therapy   $ Aerosol Therapy Charges Aerosol Treatment   Daily Review of Necessity (SVN) completed   Respiratory Treatment Status (SVN) given   Treatment Route (SVN) mask   Patient Position (SVN) HOB elevated   Post Treatment Assessment (SVN) breath sounds improved   Signs of Intolerance (SVN) none   Breath Sounds Post-Respiratory Treatment   Throughout All Fields Post-Treatment All Fields   Throughout All Fields Post-Treatment aeration increased   Post-treatment Heart Rate (beats/min) 93   Post-treatment Resp Rate (breaths/min) 21   Wound Care   $ Wound Care Tech Time 15 min   Area of Concern Bridge of nose   Skin Color/Characteristics without discoloration   Skin Temperature warm   Ready to Wean/Extubation Screen   FIO2<=50 (chart decimal) 0.32   Preset CPAP/BiPAP Settings   Mode Of Delivery BiPAP S/T;Standby   $ CPAP/BiPAP Daily Charge BiPAP/CPAP Daily   $ Is patient using? No/refused   Size of Mask Small/Medium   Equipment Type V60   Ipap 14   EPAP (cm H2O) 6   Pressure Support (cm H2O) 8   continue therapy, bipap available

## 2020-09-23 NOTE — PROGRESS NOTES
"[facility]  Nephrology  Progress Note    Patient Name: Griselda Neely  MRN: [id number]  Admission Date: 9/20/2020  Hospital Length of Stay: 3 days  Attending Provider: Cristian Lopez MD   Primary Care Physician: Kalie Neely MD  Principal Problem:Acute on chronic respiratory failure    Consults  Subjective:     Interval History:   Alert and confused; repeating "I want to go Home"  Breathing improved  Dialysis in progress; goal 3 liters  No acute events overnight    Review of patient's allergies indicates:  No Known Allergies  Current Facility-Administered Medications   Medication Frequency    0.9%  NaCl infusion PRN    0.9%  NaCl infusion Once    0.9%  NaCl infusion PRN    0.9%  NaCl infusion Once    aspirin EC tablet 81 mg Daily    calcium acetate(phosphat bind) capsule 667 mg Daily    chlorhexidine 0.12 % solution 15 mL BID    diltiaZEM 24 hr capsule 180 mg Daily    ipratropium 0.02 % nebulizer solution 0.5 mg Q6H    isosorbide mononitrate 24 hr tablet 30 mg Daily    levalbuterol nebulizer solution 0.63 mg Q6H    lorazepam injection 1 mg Q4H PRN    magnesium oxide tablet 400 mg Daily    metoprolol succinate (TOPROL-XL) 24 hr tablet 25 mg Nightly    mupirocin 2 % ointment BID    pantoprazole EC tablet 40 mg Before breakfast    sodium chloride 0.9% flush 10 mL PRN    sodium thiosulfate 12.5 gram/50 mL (250 mg/mL) injection 25 g Every Mon, Wed, Fri    traMADoL tablet 50 mg Q8H PRN    zinc sulfate capsule 220 mg Daily       Objective:     Vital Signs (Most Recent):  Temp: 98 °F (36.7 °C) (09/23/20 1255)  Pulse: 87 (09/23/20 1330)  Resp: 20 (09/23/20 1255)  BP: (!) 140/85 (09/23/20 1330)  SpO2: 100 % (09/23/20 1101)  O2 Device (Oxygen Therapy): nasal cannula (09/23/20 1100) Vital Signs (24h Range):  Temp:  [97.5 °F (36.4 °C)-98.1 °F (36.7 °C)] 98 °F (36.7 °C)  Pulse:  [] 87  Resp:  [15-49] 20  SpO2:  [68 %-100 %] 100 %  BP: (117-170)/(52-90) 140/85     Weight: 55.6 kg " "(122 lb 9.2 oz) (09/22/20 1701)  Body mass index is 20.4 kg/m².  Body surface area is 1.6 meters squared.    I/O last 3 completed shifts:  In: 1589 [P.O.:850; I.V.:139; Other:500; IV Piggyback:100]  Out: 3500 [Other:3500]    Physical Exam     Physical Exam  Constitutional:       Appearance: She is ill-appearing.   HENT:      Head: Normocephalic and atraumatic.      Nose: Nose normal.      Mouth/Throat:      Mouth: Mucous membranes are moist.   Eyes:      Extraocular Movements: Extraocular movements intact.      Pupils: Pupils are equal, round, and reactive to light.   Neck:      Comments: No JVD  Cardiovascular:      Rate and Rhythm: Tachycardia present. Rhythm irregular.      Comments: A-fib RVR  Pulmonary:      Breath sounds: No stridor. No rhonchi.      Comments: Diminished; fine crackles  Abdominal:      General: There is no distension.      Palpations: Abdomen is soft.      Tenderness: There is no abdominal tenderness.   Musculoskeletal:         General: No swelling.      Right lower leg: No edema.      Left lower leg: No edema.   Skin:     General: Skin is warm and dry.      Comments: AVF right arm +bruit/thrill   Neurological:      Comments: alert and confused; repeatedly "I want to go Home"               Significant Labs:sure  ABGs:   Recent Labs   Lab 09/21/20  0006   PH 7.398   PCO2 47.1*   HCO3 29.1*   POCSATURATED 79*   BE 4     BMP:   Recent Labs   Lab 09/22/20  0948 09/23/20  0341   * 83   CL 95 98   CO2 22* 23   BUN 87* 103*   CREATININE 5.5* 6.5*   CALCIUM 8.8 8.5*   MG 1.9  --      Cardiac Markers: No results for input(s): CKMB, TROPONINT, MYOGLOBIN in the last 168 hours.  CBC:   Recent Labs   Lab 09/23/20  0341   WBC 9.24   RBC 3.17*   HGB 8.8*   HCT 28.9*      MCV 91   MCH 27.8   MCHC 30.4*     CMP:   Recent Labs   Lab 09/21/20  0337  09/23/20  0341   *   < > 83   CALCIUM 8.8   < > 8.5*   ALBUMIN 3.4*   < > 2.9*   PROT 8.2  --   --       < > 137   K 5.2*   < > 4.4   CO2 " 25   < > 23   CL 95   < > 98   BUN 92*   < > 103*   CREATININE 6.8*   < > 6.5*   ALKPHOS 141*  --   --    ALT 24  --   --    AST 43*  --   --    BILITOT 0.9  --   --     < > = values in this interval not displayed.     Coagulation:   Recent Labs   Lab 09/22/20  0948   INR 3.1     LFTs:   Recent Labs   Lab 09/21/20  0337  09/23/20  0341   ALT 24  --   --    AST 43*  --   --    ALKPHOS 141*  --   --    BILITOT 0.9  --   --    PROT 8.2  --   --    ALBUMIN 3.4*   < > 2.9*    < > = values in this interval not displayed.     Microbiology Results (last 7 days)     Procedure Component Value Units Date/Time    Blood culture #1 **CANNOT BE ORDERED STAT** [[id number]] Collected: 09/20/20 2200    Order Status: Completed Specimen: Blood from Peripheral, Antecubital, Left Updated: 09/23/20 0232     Blood Culture, Routine No Growth to date      No Growth to date      No Growth to date    Blood culture #2 **CANNOT BE ORDERED STAT** [[id number]] Collected: 09/20/20 2215    Order Status: Completed Specimen: Blood from Peripheral, Antecubital, Left Updated: 09/23/20 0232     Blood Culture, Routine No Growth to date      No Growth to date      No Growth to date        Specimen (12h ago, onward)    None        PTH: No results for input(s): PTH in the last 168 hours.  TSH:   Recent Labs   Lab 09/21/20 0337   TSH 3.210     No results for input(s): COLORU, CLARITYU, SPECGRAV, PHUR, PROTEINUA, GLUCOSEU, BILIRUBINCON, BLOODU, WBCU, RBCU, BACTERIA, MUCUS, NITRITE, LEUKOCYTESUR, UROBILINOGEN, HYALINECASTS in the last 168 hours.  All labs within the past 24 hours have been reviewed.    Significant Imaging:      Assessment/Plan:     Active Diagnoses:    Diagnosis Date Noted POA    PRINCIPAL PROBLEM:  Acute on chronic respiratory failure [J96.20] 05/13/2019 Unknown    Acute on chronic systolic CHF (congestive heart failure) [I50.23] 09/22/2020 Yes    Acute on chronic systolic heart failure [I50.9] 09/20/2020 Yes    Atrial thrombus [I51.3]  09/05/2020 Yes    Long term current use of anticoagulant [Z79.01] 09/05/2020 Not Applicable    H/O mitral valve replacement [Z95.2] 03/17/2020 Not Applicable     Chronic    DNR (do not resuscitate) [Z66] 09/30/2019 Yes     Chronic    ESRD (end stage renal disease) on HD M,W, F [N18.6] 05/11/2016 Yes     Chronic    Tobacco dependence [F17.200] 09/09/2013 Yes     Chronic    HTN (hypertension) [I10] 08/15/2013 Yes     Chronic      Problems Resolved During this Admission:       Assessment and Plan:     ESRD:  -M-W-F schedule  -CRS- hypervolemia; HD today; goal 3 liters; confused and yelling out  -negative fluid balance  -renal chemistries stable  -high protein/ low K diet  -strict I&O  -Daily weight  -Renal dose all meds/appropriate GFR  -No BP/IV stick right arm  -Anuric     Acute on chronic HF:  -elevated BNP  -last echo 1/2020; 25%  -CXR- revealing small right-sided effusion and trace amount of left-sided pleural  Fluid.  -breathing improved today  -Cardiology consult noted and following     Atrial Fibrillation with RVR:  -off Cardizem gtt  -on coumadin     Acute on chronic Resp.Failure:  -nebulizers  -O2; titrate  -pulmonology consulting     Anemia:  -below goal 10-12  -monitor closley     Renal BMD:  -Ca and Phos ok  -may hold binders     Hypoalbuminemia:  -3.4g/dL  -maximize nutrition     Hyperkalemia:  -improved today  -titrate bath with HD    Plan is tentatively  D/C after dialysis.  Close follow-up outpatient.         Gabe Barksdale NP  Nephrology  [facility]

## 2020-09-23 NOTE — PROGRESS NOTES
[facility]  Cardiology  Progress Note    Patient Name: Griselda Neely  MRN: [id number]  Admission Date: 9/20/2020  Hospital Length of Stay: 2 days  Code Status: DNR   Attending Physician: Cristian Lopez MD   Primary Care Physician: Kalie Neely MD  Expected Discharge Date:   Principal Problem:Acute on chronic respiratory failure    Subjective:       Interval History: Still with shortness of breath. Denies any chest pain.     ROS   Denies any bleeding issues.   Objective:     Vital Signs (Most Recent):  Temp: 98.1 °F (36.7 °C) (09/22/20 1915)  Pulse: 107 (09/22/20 2020)  Resp: 15 (09/22/20 2020)  BP: 137/71 (09/22/20 1900)  SpO2: 97 % (09/22/20 2020) Vital Signs (24h Range):  Temp:  [97.3 °F (36.3 °C)-98.1 °F (36.7 °C)] 98.1 °F (36.7 °C)  Pulse:  [] 107  Resp:  [15-28] 15  SpO2:  [68 %-100 %] 97 %  BP: (117-184)/() 137/71     Weight: 55.6 kg (122 lb 9.2 oz)  Body mass index is 20.4 kg/m².    SpO2: 97 %  O2 Device (Oxygen Therapy): nasal cannula      Intake/Output Summary (Last 24 hours) at 9/22/2020 2117  Last data filed at 9/22/2020 1701  Gross per 24 hour   Intake 1539 ml   Output 3500 ml   Net -1961 ml       Lines/Drains/Airways     Drain                 Hemodialysis AV Fistula 08/08/19 0214 Right upper arm 411 days          Peripheral Intravenous Line                 Peripheral IV - Single Lumen 09/20/20 2142 22 G Left Hand 1 day         Peripheral IV - Single Lumen 09/21/20 0815 20 G Anterior;Left;Proximal;Lateral Forearm 1 day                Scheduled Meds:   sodium chloride 0.9%   Intravenous Once    sodium chloride 0.9%   Intravenous Once    aspirin  81 mg Oral Daily    calcium acetate(phosphat bind)  667 mg Oral Daily    ceFEPime (MAXIPIME) IVPB  2 g Intravenous Q12H    chlorhexidine  15 mL Mouth/Throat BID    diltiaZEM  180 mg Oral Daily    ipratropium  0.5 mg Nebulization Q6H    isosorbide mononitrate  30 mg Oral Daily    levalbuterol  0.63 mg Nebulization Q6H     magnesium oxide  400 mg Oral Daily    metoprolol succinate  25 mg Oral Nightly    mupirocin   Nasal BID    pantoprazole  40 mg Oral Before breakfast    zinc sulfate  220 mg Oral Daily     Continuous Infusions:  PRN Meds:.sodium chloride 0.9%, sodium chloride 0.9%, lorazepam, sodium chloride 0.9%, traMADoL     Physical Exam  HEENT: Normocephalic, atraumatic, PERRL, Conjunctiva pink, no scleral icterus.   CVS: S1S2+, RRR, no murmurs, rubs or gallops.  LUNGS: Crackles+  ABDOMEN: Soft, NT, BS+  EXTREMITIES: No cyanosis, clubbing or edema  NEURO: Obeys simple commands. .     Significant Labs:   BMP:   Recent Labs   Lab 09/21/20 0337 09/22/20  0948   * 155*    137   K 5.2* 4.5   CL 95 95   CO2 25 22*   BUN 92* 87*   CREATININE 6.8* 5.5*   CALCIUM 8.8 8.8   MG 1.9 1.9   , CMP   Recent Labs   Lab 09/21/20 0337 09/22/20  0948    137   K 5.2* 4.5   CL 95 95   CO2 25 22*   * 155*   BUN 92* 87*   CREATININE 6.8* 5.5*   CALCIUM 8.8 8.8   PROT 8.2  --    ALBUMIN 3.4* 3.0*   BILITOT 0.9  --    ALKPHOS 141*  --    AST 43*  --    ALT 24  --    ANIONGAP 20* 20*   ESTGFRAFRICA 6.3* 8.2*   EGFRNONAA 5.5* 7.1*   , CBC   Recent Labs   Lab 09/21/20 0339 09/22/20 0948   WBC 8.41 11.51   HGB 9.6* 8.2*   HCT 31.5* 27.2*    268   , INR   Recent Labs   Lab 09/21/20  0548 09/22/20 0948   INR 3.2 3.1   , Lipid Panel No results for input(s): CHOL, HDL, LDLCALC, TRIG, CHOLHDL in the last 48 hours. and Troponin   Recent Labs   Lab 09/21/20 0337   TROPONINI 0.033       Significant Imaging: Reviewed  Assessment and Plan:     IMPRESSION:  Congestive heart failure. Reduced LV systolic function.  Volume overload on exam.     Mitral regurgitation.  Severe.  With severe calcification.  Not a candidate for percutaneous interventions.  After discussion with Dr. oLra, she decided to be treated only with medical management. Newly reduced LVEF to 35-40% on 4/11/2019. Status post mitral valve replacement with a  size 25 mm [employer] bovine pericardial prosthesis via right thoracotomy on by Dr. Lora 3/21/2019.      Atrial fibrillation.  Persistent. Currently in atrial fibrillation with RVR.  On coumadin with therapeutic INR.       End-stage renal disease on hemodialysis.  MWF. Follows with Dr. Ingram.      Tobacco abuse.  Ongoing despite counseling.     Left atrial thrombus.      CODE STATUS was addressed on 7/16/2020 and the patient was alert, oriented ×3.  She preferred to be a DNI.  She would like to be resuscitated. CODE STATUS was addressed with the presence of her sister and son.      PLAN:  1. Dialysis with fluid removal again today.  2. Continue current management.       Katharina Powell MD  Cardiology  Counts include 234 beds at the [facility]

## 2020-09-23 NOTE — DISCHARGE SUMMARY
"[facility]  Discharge Summary      Patient Name: Griselda Neely  MRN: [id number]  Admission Date: 9/20/2020  Hospital Length of Stay: 3 days  Discharge Date and Time:  09/23/2020 6:49 PM  Attending Physician: No att. providers found   Discharging Provider: Cristian Lopez MD  Primary Care Provider: Kalie Neely MD      HPI:   As per admitting provider's note:    "Griselda Neely is a 75 y.o. 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 The patient presented to [facility] on 9/20/2020 with a primary complaint of Shortness of Breath (onset today with worsening of SOB- reports hx of COPD. Home O2in use @ 4L/NC during triage)  .   75 year old  female with a known history end-stage renal disease requiring hemodialysis 3 times a week, hypertension, atrial fibrillation, atrial thrombus, calciphylaxis, hyperlipidemia presents to the emergency room with complaints of shortness of breath.       The patient also states she a history of COPD is on oxygen at 4 L a minute at home.       The patient presents with complaints of worsening shortness of breath over the last few days.  She endorses a productive cough that is clear in color and thick in consistency.  She denies fever, chills, hematemesis or hemoptysis.       She states that she has been compliant with her hemodialysis session and her last hemodialysis was on Friday.  She normally dialyzed on Monday Wednesdays and Fridays.  She dialyzed for her full time.     She is currently being treated for left upper thigh wound that is healing well."    * No surgery found *      Hospital Course: "   During her hospital stay patient was treated in ICU for acute hypoxic respiratory failure due to acute systolic CHF exacerbation.  She received dialysis 3 days in a row and improved remarkably and her oxygen requirement was back to her normal.  In the beginning of admission there was a concern of pneumonia however procalcitonin came back negative and we stop antibiotics.  This patient has numerous hospital admissions mainly for hypervolemia, not able to go to dialysis, noncompliance.  As per my discussion with Dr. Ingram he might run extra session of dialysis on Saturday outpatient.  Upon my re-evaluation later in the day she is eating her dinner and her sister is present at bedside. She appears back to her baseline and is very irritated being in the hospital.     Instructions provided to follow up with primary care physician as outpatient. Patient verbalized understanding and is aware to contact primary care physician or return to ED if new or worsening symptoms.    Physical exam on the day of discharge:  General: Patient resting comfortably in no acute distress.  Chronically ill-appearing  Lungs:  On nasal cannula,  CTA. Good air entry.  Cor: Regular rate and rhythm. No murmurs. No pedal edema.  Abd: Soft. Nontender. Non-distended.  Neuro: A&O x3. Moving all 4 extremities equally  Ext: No clubbing. No cyanosis.   Skin:  Chronic skin changes due to kidney disease     Consults:   Consults (From admission, onward)        Status Ordering Provider     Inpatient consult to Cardiology  Once     Provider:  Katharina Powell MD    Completed GREG ARAGON     Inpatient consult to Hospitalist  Once     Provider:  Chalino De Leon MD    Acknowledged ANGELITO MAURICE     Inpatient consult to Nephrology  Once     Provider:  Joseph Ingram MD    Acknowledged ANGELITO MAURICE     Inpatient consult to Nephrology  Once     Provider:  Joseph Ingram MD    Acknowledged HARSHAL NAVARRETE new Assessment & Plan  notes have been filed under this hospital service since the last note was generated.  Service: Hospital Medicine    Final Active Diagnoses:    Diagnosis Date Noted POA    PRINCIPAL PROBLEM:  Acute on chronic respiratory failure [J96.20] 05/13/2019 Unknown    Acute on chronic systolic heart failure [I50.9] 09/20/2020 Yes    Atrial thrombus [I51.3] 09/05/2020 Yes    Long term current use of anticoagulant [Z79.01] 09/05/2020 Not Applicable    H/O mitral valve replacement [Z95.2] 03/17/2020 Not Applicable     Chronic    DNR (do not resuscitate) [Z66] 09/30/2019 Yes     Chronic    ESRD (end stage renal disease) on HD M,W, F [N18.6] 05/11/2016 Yes     Chronic    Tobacco dependence [F17.200] 09/09/2013 Yes     Chronic    HTN (hypertension) [I10] 08/15/2013 Yes     Chronic      Problems Resolved During this Admission:    Diagnosis Date Noted Date Resolved POA    Acute on chronic systolic CHF (congestive heart failure) [I50.23] 09/22/2020 09/23/2020 Yes       Discharged Condition: good    [place] [facility]    Follow Up:  Follow-up Information     Kalie Neely MD In 1 week.    Specialty: Family Medicine  Contact information:  [address] [facility]  [address]  [place] [address]  [phone]             Joseph Ingram MD In 1 week.    Specialty: Nephrology  Contact information:  [address] [facility] [address]  [phone]             Katharina Powell MD In 2 weeks.    Specialties: Cardiovascular Disease, Cardiology, INTERVENTIONAL CARDIOLOGY  Contact information:  [address]  [address]  [place] [address]  [phone]                 Patient Instructions:      Diet renal     Notify your health care provider if you experience any of the following:  temperature >100.4     Notify your health care provider if you experience any of the following:  difficulty breathing or increased cough     Notify your health care provider if you experience any of the following:  increased confusion  or weakness     Activity as tolerated       Significant Diagnostic Studies: Labs: All labs within the past 24 hours have been reviewed    Pending Diagnostic Studies:     None         Medications:  Reconciled Home Medications:      Medication List      CONTINUE taking these medications    aspirin 81 MG EC tablet  Commonly known as: ECOTRIN  Take 81 mg by mouth once daily.     calcium acetate(phosphat bind) 667 mg capsule  Commonly known as: PHOSLO  Take 667 mg by mouth once daily.     diltiaZEM 180 MG 24 hr capsule  Commonly known as: CARDIZEM CD  Take 180 mg by mouth once daily.     isosorbide mononitrate 30 MG 24 hr tablet  Commonly known as: IMDUR  Take 30 mg by mouth once daily.     losartan 25 MG tablet  Commonly known as: COZAAR  Take 25 mg by mouth once daily.     magnesium oxide 400 mg (241.3 mg magnesium) tablet  Commonly known as: MAG-OX  Take 1 tablet by mouth once daily     metoprolol succinate 25 MG 24 hr tablet  Commonly known as: TOPROL-XL  Take 1 tablet (25 mg total) by mouth nightly.     ondansetron 4 MG Tbdl  Commonly known as: ZOFRAN-ODT  Take 4 mg by mouth every 6 (six) hours as needed.     oxyCODONE-acetaminophen 5-325 mg per tablet  Commonly known as: PERCOCET  Take 1 tablet by mouth every 6 (six) hours as needed for Pain.     pantoprazole 40 MG tablet  Commonly known as: PROTONIX  Take 1 tablet (40 mg total) by mouth once daily.     JENNIFER-KODAK ORAL  Take 1 tablet by mouth once daily.     traMADoL 50 mg tablet  Commonly known as: ULTRAM  Take 50 mg by mouth every 8 (eight) hours as needed for Pain.     warfarin 5 MG tablet  Commonly known as: COUMADIN  Take 5 mg by mouth once daily.     wheelchair An  1 Device by Misc.(Non-Drug; Combo Route) route as needed (needed for transport).     zinc sulfate 220 mg Tab tablet  Take 220 mg by mouth every other day.            Indwelling Lines/Drains at time of discharge:   Lines/Drains/Airways     Drain                 Hemodialysis AV Fistula 08/08/19 3869  Right upper arm 412 days                Time spent on the discharge of patient: 43 minutes  Patient was seen and examined on the date of discharge and determined to be suitable for discharge.         Cristian Lopez MD  Department of Hospital Medicine  [facility]

## 2020-09-23 NOTE — PLAN OF CARE
09/23/20 1728   Final Note   Assessment Type Final Discharge Note   Anticipated Discharge Disposition Home-Health   Post-Acute Status   Post-Acute Authorization Home Health   Home Health Status Referrals Sent  ([facility])   Part D Coverage [employer]   Patient choice form signed by patient/caregiver List with quality metrics by geographic area provided   Discharge Delays None known at this time

## 2020-09-24 ENCOUNTER — TELEPHONE (OUTPATIENT)
Dept: FAMILY MEDICINE | Facility: CLINIC | Age: 75
End: 2020-09-24

## 2020-09-24 ENCOUNTER — TELEPHONE (OUTPATIENT)
Dept: CARDIOLOGY | Facility: CLINIC | Age: 75
End: 2020-09-24

## 2020-09-24 NOTE — TELEPHONE ENCOUNTER
----- Message from Clotilde Anthony sent at 9/24/2020  3:02 PM CDT -----  Regarding: Discharged  [facility] Summer needs to know if patient INR needs to be checked  [phone]

## 2020-09-24 NOTE — TELEPHONE ENCOUNTER
Spoke with Summer  nurse regarding pt next INR. Pt was just released from [facility]. Spoke with Dr. SHERWOOD pt can recheck inr Tuesday. Informed Summer she would go Tuesday and call us with results.

## 2020-09-24 NOTE — PROGRESS NOTES
[facility]  Cardiology  Progress Note    Patient Name: Griselda Neely  MRN: [id number]  Admission Date: 9/20/2020  Hospital Length of Stay: 3 days  Code Status: Prior   Attending Physician: No att. providers found   Primary Care Physician: Kalie Neely MD  Expected Discharge Date: 9/23/2020  Principal Problem:Acute on chronic respiratory failure    Subjective:       Interval History: Patient seen earlier this AM. She was still short of breath although better.     ROS  Objective:     Vital Signs (Most Recent):  Temp: 98 °F (36.7 °C) (09/23/20 1255)  Pulse: 90 (09/23/20 1535)  Resp: 20 (09/23/20 1255)  BP: (!) 161/95 (09/23/20 1535)  SpO2: 100 % (09/23/20 1101) Vital Signs (24h Range):  Temp:  [97.5 °F (36.4 °C)-98 °F (36.7 °C)] 98 °F (36.7 °C)  Pulse:  [] 90  Resp:  [20-49] 20  SpO2:  [95 %-100 %] 100 %  BP: (136-170)/(70-95) 161/95     Weight: 55.6 kg (122 lb 9.2 oz)  Body mass index is 20.4 kg/m².    SpO2: 100 %  O2 Device (Oxygen Therapy): High Flow nasal Cannula      Intake/Output Summary (Last 24 hours) at 9/23/2020 2133  Last data filed at 9/23/2020 1535  Gross per 24 hour   Intake 890 ml   Output 2600 ml   Net -1710 ml       Lines/Drains/Airways     Drain                 Hemodialysis AV Fistula 08/08/19 0214 Right upper arm 412 days          Peripheral Intravenous Line                 Peripheral IV - Single Lumen 09/20/20 2142 22 G Left Hand 2 days         Peripheral IV - Single Lumen 09/21/20 0815 20 G Anterior;Left;Proximal;Lateral Forearm 2 days                Scheduled Meds:  Continuous Infusions:  PRN Meds:.     Physical Exam  HEENT: Normocephalic, atraumatic, PERRL, Conjunctiva pink, no scleral icterus.   CVS: S1S2+, iRRR, no murmurs, rubs or gallops.  LUNGS: Crackles+  ABDOMEN: Soft, NT, BS+  EXTREMITIES: No cyanosis, clubbing. Trace edema  NEURO: AAO X 3.       Significant Labs:   BMP:   Recent Labs   Lab 09/22/20  0948 09/23/20  0341   * 83    137   K 4.5 4.4    CL 95 98   CO2 22* 23   BUN 87* 103*   CREATININE 5.5* 6.5*   CALCIUM 8.8 8.5*   MG 1.9  --    , CMP   Recent Labs   Lab 09/22/20  0948 09/23/20  0341    137   K 4.5 4.4   CL 95 98   CO2 22* 23   * 83   BUN 87* 103*   CREATININE 5.5* 6.5*   CALCIUM 8.8 8.5*   ALBUMIN 3.0* 2.9*   ANIONGAP 20* 16   ESTGFRAFRICA 8.2* 6.7*   EGFRNONAA 7.1* 5.8*   , CBC   Recent Labs   Lab 09/22/20  0948 09/23/20  0341   WBC 11.51 9.24   HGB 8.2* 8.8*   HCT 27.2* 28.9*    273   , INR   Recent Labs   Lab 09/22/20  0948   INR 3.1   , Lipid Panel No results for input(s): CHOL, HDL, LDLCALC, TRIG, CHOLHDL in the last 48 hours. and Troponin No results for input(s): TROPONINI in the last 48 hours.    Significant Imaging: Reviewed  Assessment and Plan:     IMPRESSION:  Congestive heart failure. Reduced LV systolic function.  Volume overload on exam.     Mitral regurgitation.  Severe.  With severe calcification.  Not a candidate for percutaneous interventions.  After discussion with Dr. Lora, she decided to be treated only with medical management. Newly reduced LVEF to 35-40% on 4/11/2019. Status post mitral valve replacement with a size 25 mm [employer] bovine pericardial prosthesis via right thoracotomy on by Dr. Lora 3/21/2019.      Atrial fibrillation.  Persistent. Currently in atrial fibrillation with RVR.  On coumadin with therapeutic INR.       End-stage renal disease on hemodialysis.  MWF. Follows with Dr. Ingram.      Tobacco abuse.  Ongoing despite counseling.     Left atrial thrombus.      CODE STATUS was addressed on 7/16/2020 and the patient was alert, oriented ×3.  She preferred to be a DNI.  She would like to be resuscitated. CODE STATUS was addressed with the presence of her sister and son.      PLAN:  1. Plans for HD today.   2. Patient could be discharged from Cardiology stand point after HD.   3. Follow up in clinic as previously scheduled.   4. Dr Ingram planning UF on Saturdays.          Katharina Powell MD  Cardiology  [facility]

## 2020-09-25 ENCOUNTER — DOCUMENT SCAN (OUTPATIENT)
Dept: HOME HEALTH SERVICES | Facility: HOSPITAL | Age: 75
End: 2020-09-25

## 2020-09-26 LAB
BACTERIA BLD CULT: NORMAL
BACTERIA BLD CULT: NORMAL

## 2020-10-01 ENCOUNTER — INFUSION (OUTPATIENT)
Dept: INFUSION THERAPY | Facility: HOSPITAL | Age: 75
DRG: 377 | End: 2020-10-01
Attending: INTERNAL MEDICINE
Payer: MEDICARE

## 2020-10-01 ENCOUNTER — TELEPHONE (OUTPATIENT)
Dept: CARDIOLOGY | Facility: CLINIC | Age: 75
End: 2020-10-01

## 2020-10-01 ENCOUNTER — HOSPITAL ENCOUNTER (INPATIENT)
Facility: HOSPITAL | Age: 75
LOS: 3 days | Discharge: HOME HEALTH | DRG: 377 | End: 2020-10-04
Attending: EMERGENCY MEDICINE | Admitting: INTERNAL MEDICINE
Payer: MEDICARE

## 2020-10-01 VITALS
BODY MASS INDEX: 19.66 KG/M2 | OXYGEN SATURATION: 100 % | SYSTOLIC BLOOD PRESSURE: 133 MMHG | HEIGHT: 65 IN | DIASTOLIC BLOOD PRESSURE: 80 MMHG | HEART RATE: 100 BPM | WEIGHT: 118 LBS | TEMPERATURE: 98 F | RESPIRATION RATE: 20 BRPM

## 2020-10-01 DIAGNOSIS — N18.6 ESRD (END STAGE RENAL DISEASE) ON DIALYSIS: ICD-10-CM

## 2020-10-01 DIAGNOSIS — D62 ACUTE BLOOD LOSS ANEMIA: ICD-10-CM

## 2020-10-01 DIAGNOSIS — Z99.2 ESRD (END STAGE RENAL DISEASE) ON DIALYSIS: ICD-10-CM

## 2020-10-01 DIAGNOSIS — K92.2 ACUTE UPPER GI BLEEDING: ICD-10-CM

## 2020-10-01 DIAGNOSIS — I48.91 A-FIB: ICD-10-CM

## 2020-10-01 DIAGNOSIS — D64.9 ANEMIA, UNSPECIFIED TYPE: ICD-10-CM

## 2020-10-01 DIAGNOSIS — D64.9 ANEMIA, UNSPECIFIED TYPE: Primary | ICD-10-CM

## 2020-10-01 DIAGNOSIS — K92.2 LOWER GI BLEED: Primary | ICD-10-CM

## 2020-10-01 DIAGNOSIS — R06.00 DYSPNEA: ICD-10-CM

## 2020-10-01 LAB
ABO + RH BLD: NORMAL
ALBUMIN SERPL BCP-MCNC: 2.6 G/DL (ref 3.5–5.2)
ALP SERPL-CCNC: 94 U/L (ref 55–135)
ALT SERPL W/O P-5'-P-CCNC: 16 U/L (ref 10–44)
ANION GAP SERPL CALC-SCNC: 18 MMOL/L (ref 8–16)
APTT PPP: 46.8 SEC (ref 23.6–33.3)
AST SERPL-CCNC: 20 U/L (ref 10–40)
BASOPHILS # BLD AUTO: 0.03 K/UL (ref 0–0.2)
BASOPHILS NFR BLD: 0.5 % (ref 0–1.9)
BILIRUB SERPL-MCNC: 0.7 MG/DL (ref 0.1–1)
BLD GP AB SCN CELLS X3 SERPL QL: NORMAL
BLD PROD TYP BPU: NORMAL
BLD PROD TYP BPU: NORMAL
BLOOD UNIT EXPIRATION DATE: NORMAL
BLOOD UNIT EXPIRATION DATE: NORMAL
BLOOD UNIT TYPE CODE: 7300
BLOOD UNIT TYPE CODE: 7300
BLOOD UNIT TYPE: NORMAL
BLOOD UNIT TYPE: NORMAL
BNP SERPL-MCNC: >4500 PG/ML (ref 0–99)
BUN SERPL-MCNC: 83 MG/DL (ref 8–23)
CALCIUM SERPL-MCNC: 7.8 MG/DL (ref 8.7–10.5)
CHLORIDE SERPL-SCNC: 95 MMOL/L (ref 95–110)
CO2 SERPL-SCNC: 24 MMOL/L (ref 23–29)
CODING SYSTEM: NORMAL
CODING SYSTEM: NORMAL
CREAT SERPL-MCNC: 4.7 MG/DL (ref 0.5–1.4)
DIFFERENTIAL METHOD: ABNORMAL
DISPENSE STATUS: NORMAL
DISPENSE STATUS: NORMAL
EOSINOPHIL # BLD AUTO: 0 K/UL (ref 0–0.5)
EOSINOPHIL NFR BLD: 0.7 % (ref 0–8)
ERYTHROCYTE [DISTWIDTH] IN BLOOD BY AUTOMATED COUNT: 18.7 % (ref 11.5–14.5)
EST. GFR  (AFRICAN AMERICAN): 9.9 ML/MIN/1.73 M^2
EST. GFR  (NON AFRICAN AMERICAN): 8.6 ML/MIN/1.73 M^2
GLUCOSE SERPL-MCNC: 107 MG/DL (ref 70–110)
HCT VFR BLD AUTO: 21.9 % (ref 37–48.5)
HCT VFR BLD AUTO: 22.4 % (ref 37–48.5)
HGB BLD-MCNC: 6.7 G/DL (ref 12–16)
HGB BLD-MCNC: 7.1 G/DL (ref 12–16)
IMM GRANULOCYTES # BLD AUTO: 0.02 K/UL (ref 0–0.04)
IMM GRANULOCYTES NFR BLD AUTO: 0.3 % (ref 0–0.5)
INR PPP: 4.8
LYMPHOCYTES # BLD AUTO: 0.8 K/UL (ref 1–4.8)
LYMPHOCYTES NFR BLD: 13.4 % (ref 18–48)
MCH RBC QN AUTO: 28.4 PG (ref 27–31)
MCHC RBC AUTO-ENTMCNC: 30.6 G/DL (ref 32–36)
MCV RBC AUTO: 93 FL (ref 82–98)
MONOCYTES # BLD AUTO: 0.6 K/UL (ref 0.3–1)
MONOCYTES NFR BLD: 10.8 % (ref 4–15)
NEUTROPHILS # BLD AUTO: 4.4 K/UL (ref 1.8–7.7)
NEUTROPHILS NFR BLD: 74.3 % (ref 38–73)
NRBC BLD-RTO: 1 /100 WBC
NUM UNITS TRANS PACKED RBC: NORMAL
NUM UNITS TRANS PACKED RBC: NORMAL
PLATELET # BLD AUTO: 167 K/UL (ref 150–350)
PMV BLD AUTO: 10.4 FL (ref 9.2–12.9)
POTASSIUM SERPL-SCNC: 5.6 MMOL/L (ref 3.5–5.1)
PROT SERPL-MCNC: 6 G/DL (ref 6–8.4)
PROTHROMBIN TIME: 43.5 SEC (ref 10.6–14.8)
RBC # BLD AUTO: 2.36 M/UL (ref 4–5.4)
SARS-COV-2 RDRP RESP QL NAA+PROBE: NEGATIVE
SODIUM SERPL-SCNC: 137 MMOL/L (ref 136–145)
TROPONIN I SERPL DL<=0.01 NG/ML-MCNC: <0.03 NG/ML
WBC # BLD AUTO: 5.9 K/UL (ref 3.9–12.7)

## 2020-10-01 PROCEDURE — 25000003 PHARM REV CODE 250

## 2020-10-01 PROCEDURE — 36415 COLL VENOUS BLD VENIPUNCTURE: CPT

## 2020-10-01 PROCEDURE — 63600175 PHARM REV CODE 636 W HCPCS: Performed by: INTERNAL MEDICINE

## 2020-10-01 PROCEDURE — 85730 THROMBOPLASTIN TIME PARTIAL: CPT

## 2020-10-01 PROCEDURE — 36430 TRANSFUSION BLD/BLD COMPNT: CPT

## 2020-10-01 PROCEDURE — 25000003 PHARM REV CODE 250: Performed by: INTERNAL MEDICINE

## 2020-10-01 PROCEDURE — 99223 1ST HOSP IP/OBS HIGH 75: CPT | Mod: ,,, | Performed by: INTERNAL MEDICINE

## 2020-10-01 PROCEDURE — C9113 INJ PANTOPRAZOLE SODIUM, VIA: HCPCS | Performed by: INTERNAL MEDICINE

## 2020-10-01 PROCEDURE — 85014 HEMATOCRIT: CPT

## 2020-10-01 PROCEDURE — 80053 COMPREHEN METABOLIC PANEL: CPT

## 2020-10-01 PROCEDURE — 85025 COMPLETE CBC W/AUTO DIFF WBC: CPT

## 2020-10-01 PROCEDURE — 90935 HEMODIALYSIS ONE EVALUATION: CPT

## 2020-10-01 PROCEDURE — 93005 ELECTROCARDIOGRAM TRACING: CPT | Performed by: INTERNAL MEDICINE

## 2020-10-01 PROCEDURE — 99285 EMERGENCY DEPT VISIT HI MDM: CPT | Mod: 25

## 2020-10-01 PROCEDURE — 96374 THER/PROPH/DIAG INJ IV PUSH: CPT

## 2020-10-01 PROCEDURE — 84484 ASSAY OF TROPONIN QUANT: CPT

## 2020-10-01 PROCEDURE — 99223 PR INITIAL HOSPITAL CARE,LEVL III: ICD-10-PCS | Mod: ,,, | Performed by: INTERNAL MEDICINE

## 2020-10-01 PROCEDURE — 83880 ASSAY OF NATRIURETIC PEPTIDE: CPT

## 2020-10-01 PROCEDURE — 96375 TX/PRO/DX INJ NEW DRUG ADDON: CPT

## 2020-10-01 PROCEDURE — 85610 PROTHROMBIN TIME: CPT

## 2020-10-01 PROCEDURE — 93010 EKG 12-LEAD: ICD-10-PCS | Mod: ,,, | Performed by: INTERNAL MEDICINE

## 2020-10-01 PROCEDURE — 21000000 HC CCU ICU ROOM CHARGE

## 2020-10-01 PROCEDURE — 27000221 HC OXYGEN, UP TO 24 HOURS

## 2020-10-01 PROCEDURE — 93010 ELECTROCARDIOGRAM REPORT: CPT | Mod: ,,, | Performed by: INTERNAL MEDICINE

## 2020-10-01 PROCEDURE — 86850 RBC ANTIBODY SCREEN: CPT

## 2020-10-01 PROCEDURE — 63600175 PHARM REV CODE 636 W HCPCS: Performed by: EMERGENCY MEDICINE

## 2020-10-01 PROCEDURE — 86920 COMPATIBILITY TEST SPIN: CPT

## 2020-10-01 PROCEDURE — C9113 INJ PANTOPRAZOLE SODIUM, VIA: HCPCS | Performed by: EMERGENCY MEDICINE

## 2020-10-01 PROCEDURE — 20000000 HC ICU ROOM

## 2020-10-01 PROCEDURE — U0002 COVID-19 LAB TEST NON-CDC: HCPCS

## 2020-10-01 PROCEDURE — P9016 RBC LEUKOCYTES REDUCED: HCPCS

## 2020-10-01 PROCEDURE — 85018 HEMOGLOBIN: CPT

## 2020-10-01 RX ORDER — CALCIUM ACETATE 667 MG/1
667 CAPSULE ORAL DAILY
Status: DISCONTINUED | OUTPATIENT
Start: 2020-10-02 | End: 2020-10-04 | Stop reason: HOSPADM

## 2020-10-01 RX ORDER — HYDROCODONE BITARTRATE AND ACETAMINOPHEN 500; 5 MG/1; MG/1
TABLET ORAL
Status: DISCONTINUED | OUTPATIENT
Start: 2020-10-01 | End: 2020-10-04 | Stop reason: HOSPADM

## 2020-10-01 RX ORDER — TRAMADOL HYDROCHLORIDE 50 MG/1
50 TABLET ORAL ONCE
Status: COMPLETED | OUTPATIENT
Start: 2020-10-01 | End: 2020-10-01

## 2020-10-01 RX ORDER — SODIUM CHLORIDE 9 MG/ML
INJECTION, SOLUTION INTRAVENOUS
Status: DISCONTINUED | OUTPATIENT
Start: 2020-10-01 | End: 2020-10-04 | Stop reason: HOSPADM

## 2020-10-01 RX ORDER — PANTOPRAZOLE SODIUM 40 MG/10ML
80 INJECTION, POWDER, LYOPHILIZED, FOR SOLUTION INTRAVENOUS
Status: COMPLETED | OUTPATIENT
Start: 2020-10-01 | End: 2020-10-01

## 2020-10-01 RX ORDER — SODIUM CHLORIDE 0.9 % (FLUSH) 0.9 %
10 SYRINGE (ML) INJECTION
Status: DISCONTINUED | OUTPATIENT
Start: 2020-10-01 | End: 2020-10-04 | Stop reason: HOSPADM

## 2020-10-01 RX ORDER — DILTIAZEM HCL 1 MG/ML
5 INJECTION, SOLUTION INTRAVENOUS CONTINUOUS
Status: DISCONTINUED | OUTPATIENT
Start: 2020-10-01 | End: 2020-10-04

## 2020-10-01 RX ORDER — MUPIROCIN 20 MG/G
OINTMENT TOPICAL 2 TIMES DAILY
Status: DISCONTINUED | OUTPATIENT
Start: 2020-10-01 | End: 2020-10-04 | Stop reason: HOSPADM

## 2020-10-01 RX ORDER — LEVALBUTEROL INHALATION SOLUTION 0.63 MG/3ML
0.63 SOLUTION RESPIRATORY (INHALATION)
Status: DISCONTINUED | OUTPATIENT
Start: 2020-10-01 | End: 2020-10-03

## 2020-10-01 RX ORDER — PHYTONADIONE 5 MG/1
10 TABLET ORAL ONCE
Status: COMPLETED | OUTPATIENT
Start: 2020-10-01 | End: 2020-10-01

## 2020-10-01 RX ORDER — FENTANYL CITRATE 50 UG/ML
25 INJECTION, SOLUTION INTRAMUSCULAR; INTRAVENOUS
Status: DISCONTINUED | OUTPATIENT
Start: 2020-10-01 | End: 2020-10-04 | Stop reason: HOSPADM

## 2020-10-01 RX ORDER — ONDANSETRON 4 MG/1
8 TABLET, ORALLY DISINTEGRATING ORAL EVERY 8 HOURS PRN
Status: DISCONTINUED | OUTPATIENT
Start: 2020-10-01 | End: 2020-10-04 | Stop reason: HOSPADM

## 2020-10-01 RX ORDER — IPRATROPIUM BROMIDE 21 UG/1
2 SPRAY, METERED NASAL
COMMUNITY
End: 2020-10-01 | Stop reason: CLARIF

## 2020-10-01 RX ORDER — SODIUM CHLORIDE 9 MG/ML
INJECTION, SOLUTION INTRAVENOUS ONCE
Status: DISCONTINUED | OUTPATIENT
Start: 2020-10-01 | End: 2020-10-04 | Stop reason: HOSPADM

## 2020-10-01 RX ADMIN — PROMETHAZINE HYDROCHLORIDE 6.25 MG: 25 INJECTION INTRAMUSCULAR; INTRAVENOUS at 09:10

## 2020-10-01 RX ADMIN — PANTOPRAZOLE SODIUM 8 MG/HR: 40 INJECTION, POWDER, FOR SOLUTION INTRAVENOUS at 03:10

## 2020-10-01 RX ADMIN — PHYTONADIONE 10 MG: 5 TABLET ORAL at 10:10

## 2020-10-01 RX ADMIN — DILTIAZEM HYDROCHLORIDE 5 MG/HR: 5 INJECTION INTRAVENOUS at 11:10

## 2020-10-01 RX ADMIN — PANTOPRAZOLE SODIUM 80 MG: 40 INJECTION, POWDER, FOR SOLUTION INTRAVENOUS at 02:10

## 2020-10-01 RX ADMIN — PANTOPRAZOLE SODIUM 8 MG/HR: 40 INJECTION, POWDER, FOR SOLUTION INTRAVENOUS at 11:10

## 2020-10-01 RX ADMIN — MUPIROCIN: 20 OINTMENT TOPICAL at 09:10

## 2020-10-01 RX ADMIN — TRAMADOL HYDROCHLORIDE 50 MG: 50 TABLET, FILM COATED ORAL at 11:10

## 2020-10-01 NOTE — SUBJECTIVE & OBJECTIVE
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    both legs    CHF (congestive heart failure)     Encounter for blood transfusion 03/2016    Gout     Hemodialysis access, AV graft     Hypertension     Mitral valve regurgitation     Osteoarthritis     Pancreatitis     Peripheral vascular disease     Pneumonia 09/09/2017    Renal failure        Past Surgical History:   Procedure Laterality Date    ACHILLES TENDON SURGERY Right     ANGIOGRAPHY OF ARTERIOVENOUS SHUNT Right 1/7/2020    Procedure: Fistulogram with Possible Intervention;  Surgeon: Joseph Loyola MD;  Location: [facility] CATH/EP LAB;  Service: Cardiology;  Laterality: Right;    ANGIOGRAPHY OF LOWER EXTREMITY Left 3/18/2020    Procedure: Angiogram Extremity Unilateral;  Surgeon: Ali Khoobehi, MD;  Location: [facility] CATH/EP LAB;  Service: Cardiology;  Laterality: Left;    APPENDECTOMY      CARDIAC CATHETERIZATION  07/03/2017    CHOLECYSTECTOMY      ESOPHAGOGASTRODUODENOSCOPY Left 8/17/2020    Procedure: EGD (ESOPHAGOGASTRODUODENOSCOPY);  Surgeon: Talat Trevizo MD;  Location: [facility] ENDO;  Service: Endoscopy;  Laterality: Left;    heal surgery Right     HYSTERECTOMY      INSERTION OF STENT INTO PERIPHERAL VESSEL N/A 1/7/2020    Procedure: INSERTION, STENT, VESSEL, PERIPHERAL;  Surgeon: Joseph Loyola MD;  Location: [facility] CATH/EP LAB;  Service: Cardiology;  Laterality: N/A;    MITRAL VALVE REPLACEMENT      PARATHYROIDECTOMY      PARATHYROIDECTOMY  07/13/2017    PERCUTANEOUS TRANSLUMINAL ANGIOPLASTY OF ARTERIOVENOUS FISTULA N/A 1/7/2020    Procedure: PTA, AV FISTULA;  Surgeon: Joseph Loyola MD;  Location: [facility] CATH/EP LAB;  Service: Cardiology;  Laterality: N/A;    PERITONEAL CATHETER INSERTION      RENAL BIOPSY      vocal cord nodule      WOUND DEBRIDEMENT Left 7/13/2020    Procedure: DEBRIDEMENT, WOUND;  Surgeon: Carlos Elam III, MD;  Location: [facility] OR;  Service: General;   Laterality: Left;       Review of patient's allergies indicates:  No Known Allergies    Current Facility-Administered Medications on File Prior to Encounter   Medication    0.9%  NaCl infusion (for blood administration)     Current Outpatient Medications on File Prior to Encounter   Medication Sig    calcium acetate (PHOSLO) 667 mg capsule Take 667 mg by mouth once daily.     diltiaZEM (CARDIZEM CD) 180 MG 24 hr capsule Take 180 mg by mouth once daily.    folic acid/vit B complex and C (JENNIFER-KODAK ORAL) Take 1 tablet by mouth once daily.    isosorbide mononitrate (IMDUR) 30 MG 24 hr tablet Take 30 mg by mouth once daily.    losartan (COZAAR) 25 MG tablet Take 25 mg by mouth once daily.    magnesium oxide (MAG-OX) 400 mg (241.3 mg magnesium) tablet Take 1 tablet by mouth once daily (Patient taking differently: Take 400 mg by mouth once daily. )    metoprolol succinate (TOPROL-XL) 25 MG 24 hr tablet Take 1 tablet (25 mg total) by mouth nightly.    ondansetron (ZOFRAN-ODT) 4 MG TbDL Take 4 mg by mouth every 6 (six) hours as needed.    oxyCODONE-acetaminophen (PERCOCET) 5-325 mg per tablet Take 1 tablet by mouth every 6 (six) hours as needed for Pain.    pantoprazole (PROTONIX) 40 MG tablet Take 1 tablet (40 mg total) by mouth once daily.    traMADoL (ULTRAM) 50 mg tablet Take 50 mg by mouth every 8 (eight) hours as needed for Pain.    zinc sulfate 220 mg Tab tablet Take 220 mg by mouth every other day.    [DISCONTINUED] aspirin (ECOTRIN) 81 MG EC tablet Take 81 mg by mouth once daily.    [DISCONTINUED] ipratropium (ATROVENT) 0.03 % nasal spray 2 sprays by Nasal route every 12 (twelve) hours.    [DISCONTINUED] warfarin (COUMADIN) 5 MG tablet Take 5 mg by mouth once daily.    wheelchair An 1 Device by Misc.(Non-Drug; Combo Route) route as needed (needed for transport).     Family History     Problem Relation (Age of Onset)    Arthritis Mother    Diabetes Mother, Father    Early death Sister, Sister     Heart disease Sister, Maternal Grandfather, Mother    Heart failure Mother    Hypertension Mother        Tobacco Use    Smoking status: Current Some Day Smoker     Packs/day: 0.50     Years: 45.00     Pack years: 22.50     Types: Cigarettes    Smokeless tobacco: Never Used   Substance and Sexual Activity    Alcohol use: No    Drug use: No    Sexual activity: Not on file     Review of Systems   Constitutional: Positive for diaphoresis and fatigue. Negative for chills and fever.   HENT: Negative.    Eyes: Negative.    Respiratory: Positive for shortness of breath and wheezing.    Cardiovascular: Negative.    Gastrointestinal: Positive for abdominal distention, anal bleeding, blood in stool and nausea. Negative for abdominal pain and vomiting.   Endocrine: Positive for cold intolerance and heat intolerance.   Genitourinary: Positive for decreased urine volume.   Musculoskeletal: Positive for arthralgias, gait problem and myalgias.   Neurological: Positive for dizziness, weakness and light-headedness.   Hematological: Bruises/bleeds easily.   Psychiatric/Behavioral: Positive for decreased concentration. The patient is nervous/anxious.      Objective:     Vital Signs (Most Recent):  Temp: 98.2 °F (36.8 °C) (10/01/20 1735)  Pulse: (!) 113 (10/01/20 1735)  Resp: (!) 28 (10/01/20 1735)  BP: (!) 146/75 (10/01/20 1735)  SpO2: 100 % (10/01/20 1735) Vital Signs (24h Range):  Temp:  [97.8 °F (36.6 °C)-98.7 °F (37.1 °C)] 98.2 °F (36.8 °C)  Pulse:  [] 113  Resp:  [18-28] 28  SpO2:  [95 %-100 %] 100 %  BP: (123-146)/(58-86) 146/75     Weight: 53.5 kg (118 lb)  Body mass index is 19.64 kg/m².    Physical Exam  Vitals signs and nursing note reviewed.   Constitutional:       General: She is not in acute distress.     Appearance: Normal appearance. She is not ill-appearing.   HENT:      Head: Normocephalic.      Nose: Nose normal.      Mouth/Throat:      Mouth: Mucous membranes are moist.   Eyes:      Extraocular Movements:  Extraocular movements intact.      Pupils: Pupils are equal, round, and reactive to light.      Comments: Pale conjunctiva   Neck:      Musculoskeletal: Normal range of motion and neck supple.   Cardiovascular:      Rate and Rhythm: Normal rate and regular rhythm.      Heart sounds: Gallop present.    Pulmonary:      Breath sounds: Rales present.      Comments: bibasilar  Abdominal:      General: There is distension.      Tenderness: There is no abdominal tenderness. There is no guarding or rebound.   Musculoskeletal: Normal range of motion.   Skin:     General: Skin is warm.   Neurological:      General: No focal deficit present.      Mental Status: She is alert.   Psychiatric:      Comments: Dulled affect           CRANIAL NERVES     CN III, IV, VI   Pupils are equal, round, and reactive to light.       Significant Labs:   Recent Lab Results       10/01/20  1527   10/01/20  1503   10/01/20  1443   10/01/20  1439        Unit Blood Type Code 7300[P]            7300[P]           Unit Expiration [id number][P]            [id number][P]           Unit Blood Type B POS[P]            B POS[P]           Albumin   2.6         Alkaline Phosphatase   94         ALT   16         Anion Gap   18         aPTT   46.8  Comment:  Therapeutic Range of 67.5-105.1 secs.  Equivalent to Heparin Concentration of anti-Xa 0.3-0.7 IU/ml.           AST   20         Baso #       0.03     Basophil%       0.5     BILIRUBIN TOTAL   0.7  Comment:  For infants and newborns, interpretation of results should be based  on gestational age, weight and in agreement with clinical  observations.  Premature Infant recommended reference ranges:  Up to 24 hours.............<8.0 mg/dL  Up to 48 hours............<12.0 mg/dL  3-5 days..................<15.0 mg/dL  6-29 days.................<15.0 mg/dL           BNP   >4,500  Comment:  Values of less than 100 pg/ml are consistent with non-CHF populations.         BUN, Bld   83         Calcium   7.8          Chloride   95         CO2   24         CODING SYSTEM [id number][P]            [id number][P]           Creatinine   4.7         Differential Method       Automated     DISPENSE STATUS CROSSMATCHED[P]            ISSUED[P]           eGFR if    9.9         eGFR if non    8.6  Comment:  Calculation used to obtain the estimated glomerular filtration  rate (eGFR) is the CKD-EPI equation.            Eos #       0.0     Eosinophil%       0.7     Glucose   107         Gran # (ANC)       4.4     Gran%       74.3     Group & Rh   B POS         Hematocrit       21.9     Hemoglobin       6.7  Comment:  Hgb critical result(s) repeated. Called and verbal readback obtained   from Mary Christianson RN/ED by JBSona 10/01/2020 15:23       Immature Grans (Abs)       0.02  Comment:  Mild elevation in immature granulocytes is non specific and   can be seen in a variety of conditions including stress response,   acute inflammation, trauma and pregnancy. Correlation with other   laboratory and clinical findings is essential.       Immature Granulocytes       0.3     INDIRECT YADIRA   NEG         INR   4.8  Comment:  Coumadin Therapy:  INR: 2.0-3.0 conventional anticoagulation  INR: 2.5-3.5 intensive anticoagulation           Lymph #       0.8     Lymph%       13.4     MCH       28.4     MCHC       30.6     MCV       93     Mono #       0.6     Mono%       10.8     MPV       10.4     nRBC       1     Platelets       167     Potassium   5.6         Product Code [id number][P]            [id number][P]           PROTEIN TOTAL   6.0         PT   43.5         RBC       2.36     RDW       18.7     SARS-CoV-2 RNA, Amplification, Qual     Negative  Comment:  This test utilizes isothermal nucleic acid amplification   technology to detect the SARS-CoV-2 RdRp nucleic acid segment.   The analytical sensitivity (limit of detection) is 125 genome   equivalents/mL.   A POSITIVE result implies infection with the SARS-CoV-2 virus;  the  patient is presumed to be contagious.    A NEGATIVE result means that SARS-CoV-2 nucleic acids are not  present above the limit of detection. A NEGATIVE result should be   treated as presumptive. It does not rule out the possibility of   COVID-19 and should not be the sole basis for treatment decisions.   If COVID-19 is strongly suspected based on clinical and exposure   history, re-testing using an alternate molecular assay should be   considered.   This test is only for use under the Food and Drug   Administration s Emergency Use Authorization (EUA).   Commercial kits are provided by [employer].   Performance characteristics of the EUA have been independently  verified by [facility] Department of  Pathology and Laboratory Medicine.   _________________________________________________________________  The ID NOW COVID-19 Letter of Authorization, along with the   authorized Fact Sheet for Healthcare Providers, the authorized Fact  Sheet for Patients, and authorized labeling are available on the FDA   website:  www.fda.gov/MedicalDevices/Safety/EmergencySituations/hrm283011.htm         Sodium   137         Troponin I   <0.030         UNIT NUMBER [id number][P]            [id number][P]           WBC       5.90           Significant Imaging: I have reviewed all pertinent imaging results/findings within the past 24 hours.

## 2020-10-01 NOTE — TELEPHONE ENCOUNTER
----- Message from Clotilde Anthony sent at 10/1/2020 12:18 PM CDT -----  Regarding: warfarin  Patient is bleeding dark color blood from the rectum please give her sister Elvi a call [phone]

## 2020-10-01 NOTE — TELEPHONE ENCOUNTER
Spoke with patient sister that patient needs to go to ED due to rectal bleeding and taking Coumadin. Sister advised patient agreed to go. Informed Dr. SHERWOOD.

## 2020-10-01 NOTE — ED NOTES
Pt 100% on RA. Angrily states she cant breathe and that she is on 4L at home and wants to be placed on Bi-pap. Pt resp even and unlabored.

## 2020-10-01 NOTE — ED PROVIDER NOTES
Encounter Date: 10/1/2020       History     Chief Complaint   Patient presents with    Rectal Bleeding     blood in stool  had blood transfuion today     This is a 75-year-old female with past medical history of end-stage renal disease on hemodialysis, atrial fibrillation on Coumadin, hypertension, peripheral vascular disease, calciphylaxis who presents emergency department with rectal bleeding.  She says that she was admitted last week to the hospital and have a upper endoscopy with a Cruz I something in her stomach.  She says that she has been fine up until yesterday where she had multiple episodes of black looking dark tarry type stool.  Today she has had 2-3 episodes as well.  She was told that her hemoglobin was low at and had an outpatient blood transfusion earlier today.  She received 1 unit packed red blood cells.  No other labs were checked.  INR was not checked today.  She denies vomiting any blood.  She denies any pain associated with this specifically no abdominal pain.  Says she is short of breath chronically and wears 3 L of oxygen with her COPD but no worse than usual.      Pt's son states he is POA  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    both legs    CHF (congestive heart failure)     Encounter for blood transfusion 03/2016    Gout     Hemodialysis access, AV graft     Hypertension     Mitral valve regurgitation     Osteoarthritis     Pancreatitis     Peripheral vascular disease     Pneumonia 09/09/2017    Renal failure      Past Surgical History:   Procedure Laterality Date    ACHILLES TENDON SURGERY Right     ANGIOGRAPHY OF ARTERIOVENOUS SHUNT Right 1/7/2020    Procedure: Fistulogram with Possible Intervention;  Surgeon: Joseph Loyola MD;  Location: [facility] CATH/EP LAB;  Service: Cardiology;  Laterality: Right;    ANGIOGRAPHY OF LOWER EXTREMITY Left  3/18/2020    Procedure: Angiogram Extremity Unilateral;  Surgeon: Ali Khoobehi, MD;  Location: [facility] CATH/EP LAB;  Service: Cardiology;  Laterality: Left;    APPENDECTOMY      CARDIAC CATHETERIZATION  07/03/2017    CHOLECYSTECTOMY      COLONOSCOPY Left 10/4/2020    Procedure: COLONOSCOPY;  Surgeon: Jordan Kilpatrick MD;  Location: [facility] ENDO;  Service: Endoscopy;  Laterality: Left;    ESOPHAGOGASTRODUODENOSCOPY Left 8/17/2020    Procedure: EGD (ESOPHAGOGASTRODUODENOSCOPY);  Surgeon: Talat Trevizo MD;  Location: [facility] ENDO;  Service: Endoscopy;  Laterality: Left;    ESOPHAGOGASTRODUODENOSCOPY Left 10/2/2020    Procedure: EGD (ESOPHAGOGASTRODUODENOSCOPY);  Surgeon: Talat Trevizo MD;  Location: [facility] ENDO;  Service: Endoscopy;  Laterality: Left;    heal surgery Right     HYSTERECTOMY      INSERTION OF STENT INTO PERIPHERAL VESSEL N/A 1/7/2020    Procedure: INSERTION, STENT, VESSEL, PERIPHERAL;  Surgeon: Joseph Loyola MD;  Location: [facility] CATH/EP LAB;  Service: Cardiology;  Laterality: N/A;    MITRAL VALVE REPLACEMENT      PARATHYROIDECTOMY      PARATHYROIDECTOMY  07/13/2017    PERCUTANEOUS TRANSLUMINAL ANGIOPLASTY OF ARTERIOVENOUS FISTULA N/A 1/7/2020    Procedure: PTA, AV FISTULA;  Surgeon: Joseph Loyola MD;  Location: [facility] CATH/EP LAB;  Service: Cardiology;  Laterality: N/A;    PERITONEAL CATHETER INSERTION      RENAL BIOPSY      vocal cord nodule      WOUND DEBRIDEMENT Left 7/13/2020    Procedure: DEBRIDEMENT, WOUND;  Surgeon: Carlos Elam III, MD;  Location: [facility] OR;  Service: General;  Laterality: Left;     Family History   Problem Relation Age of Onset    Heart disease Sister     Early death Sister         heart as baby    Heart disease Maternal Grandfather     Diabetes Mother     Hypertension Mother     Heart failure Mother     Heart disease Mother     Arthritis Mother     Diabetes Father     Early death Sister         infant     Social History     Tobacco Use    Smoking  status: Current Some Day Smoker     Packs/day: 0.50     Years: 45.00     Pack years: 22.50     Types: Cigarettes    Smokeless tobacco: Never Used   Substance Use Topics    Alcohol use: No    Drug use: No     Review of Systems   Constitutional: Negative for fever.   HENT: Negative for sore throat.    Respiratory: Positive for shortness of breath.    Cardiovascular: Negative for chest pain.   Gastrointestinal: Positive for blood in stool. Negative for abdominal pain, diarrhea, nausea and vomiting.   Genitourinary: Negative for dysuria.   Musculoskeletal: Negative for back pain.   Skin: Negative for rash.   Neurological: Negative for seizures, weakness, numbness and headaches.   Hematological: Does not bruise/bleed easily.   All other systems reviewed and are negative.      Physical Exam     Initial Vitals   BP Pulse Resp Temp SpO2   10/01/20 1353 10/01/20 1353 10/01/20 1353 10/01/20 1353 10/01/20 1418   131/70 88 20 97.8 °F (36.6 °C) 100 %      MAP       --                Physical Exam    Nursing note and vitals reviewed.  Constitutional: She appears well-developed and well-nourished. No distress.   Frail and chronically ill-appearing.   HENT:   Head: Normocephalic and atraumatic.   Mouth/Throat: No oropharyngeal exudate.   Eyes: Conjunctivae and EOM are normal. Pupils are equal, round, and reactive to light.   Neck: Neck supple. No tracheal deviation present.   Cardiovascular: Normal heart sounds and intact distal pulses.   No murmur heard.  Borderline tachycardic.  Irregularly irregular   Pulmonary/Chest: Breath sounds normal. No stridor. No respiratory distress. She has no wheezes. She has no rhonchi. She has no rales.   Abdominal: Soft. She exhibits no distension. There is no abdominal tenderness. There is no rebound and no guarding.   Genitourinary:    Genitourinary Comments: Patient declined rectal exam.  Sign showed me a picture of the stool in the toilet bowl.  It appeared to be black tarry .      Musculoskeletal: Normal range of motion. No tenderness or edema.      Comments: Right upper arm dialysis fistula with palpable thrill and bruit.   Neurological: She is alert and oriented to person, place, and time. She has normal strength. No cranial nerve deficit or sensory deficit.   Skin: Skin is warm and dry. Capillary refill takes less than 2 seconds. No rash noted. No erythema. No pallor.   Psychiatric: She has a normal mood and affect. Thought content normal.         ED Course   Procedures  Labs Reviewed   CBC W/ AUTO DIFFERENTIAL - Abnormal; Notable for the following components:       Result Value    RBC 2.36 (*)     Hemoglobin 6.7 (*)     Hematocrit 21.9 (*)     Mean Corpuscular Hemoglobin Conc 30.6 (*)     RDW 18.7 (*)     Lymph # 0.8 (*)     nRBC 1 (*)     Gran% 74.3 (*)     Lymph% 13.4 (*)     All other components within normal limits    Narrative:      Hgb critical result(s) repeated. Called and verbal readback obtained   from Mary Christianson RN/ED by SHAWN 10/01/2020 15:23   COMPREHENSIVE METABOLIC PANEL - Abnormal; Notable for the following components:    Potassium 5.6 (*)     BUN, Bld 83 (*)     Creatinine 4.7 (*)     Calcium 7.8 (*)     Albumin 2.6 (*)     Anion Gap 18 (*)     eGFR if  9.9 (*)     eGFR if non  8.6 (*)     All other components within normal limits   APTT - Abnormal; Notable for the following components:    aPTT 46.8 (*)     All other components within normal limits   PROTIME-INR - Abnormal; Notable for the following components:    PT 43.5 (*)     All other components within normal limits   B-TYPE NATRIURETIC PEPTIDE - Abnormal; Notable for the following components:    BNP >4,500 (*)     All other components within normal limits   TROPONIN I   SARS-COV-2 RNA AMPLIFICATION, QUAL   TYPE & SCREEN        ECG Results          EKG 12-lead (Final result)  Result time 10/05/20 07:08:43    Final result by Interface, Lab In [facility] (10/05/20 07:08:43)                  Narrative:    Test Reason : R06.00,    Vent. Rate : 114 BPM     Atrial Rate : 136 BPM     P-R Int : 000 ms          QRS Dur : 094 ms      QT Int : 314 ms       P-R-T Axes : 000 -37 100 degrees     QTc Int : 432 ms    Atrial fibrillation with rapid ventricular response  Left axis deviation  Abnormal QRS-T angle, consider primary T wave abnormality  Abnormal ECG  When compared with ECG of 21-SEP-2020 16:13,  No significant change was found  Confirmed by Gordon Eason MD ([id number]) on 10/5/2020 7:08:31 AM    Referred By: MURTAZA   SELF           Confirmed By:Gordon Eason MD                            Imaging Results          X-Ray Chest AP Portable (Final result)  Result time 10/01/20 15:03:57   Procedure changed from X-Ray Chest 1 View     Final result by Yang Chamorro MD (10/01/20 15:03:57)                 Narrative:    REASON: dyspnea Dyspnea, Rectal Bleeding?(blood in stool had blood  transfuion today)    FINDINGS:    Portable chest radiograph with comparison chest x-ray September 20, 2020. There is unchanged enlargement of the cardiomediastinal  silhouette. Aortic prosthetic valve again noted. Prominence of the  pulmonary hilar vascular structures. Bilateral mixed interstitial and  airspace opacities of the lungs again noted, likely reflecting  pulmonary edema. Bilateral pleural effusions are unchanged, right  greater than left. No acute osseous abnormality.    IMPRESSION:    CHF lung pattern with enlarged cardiomediastinal silhouette, bilateral  mixed interstitial and airspace opacities and pleural effusions as  described.    Electronically Signed by Yang Chamorro on 10/1/2020 3:06 PM                                            Attending Attestation:         Attending Critical Care:   Critical Care Times:   Direct Patient Care (initial evaluation, reassessments, and time considering the case)................................................................15 minutes.   Additional History  from reviewing old medical records or taking additional history from the family, EMS, PCP, etc.......................5 minutes.   Ordering, Reviewing, and Interpreting Diagnostic Studies...............................................................................................................5 minutes.   Documentation..................................................................................................................................................................................5 minutes.   Consultation with other Physicians. .................................................................................................................................................5 minutes.   Consultation with the patient's family directly relating to the patient's condition, care, and DNR status (when patient unable)......5 minutes.   ==============================================================  · Total Critical Care Time - exclusive of procedural time: 40 minutes.  ==============================================================  Critical care was necessary to treat or prevent imminent or life-threatening deterioration of the following conditions: GI bleeding.   The following critical care procedures were done by me (see procedure notes): blood draw for specimens and pulse oximetry.   Critical care was time spent personally by me on the following activities: examination of patient, obtaining history from patient or relative, review of old charts, ordering lab, x-rays, and/or EKG, development of treatment plan with patient or relative, ordering and performing treatments and interventions, evaluation of patient's response to treatment, interpretation of cardiac measurements and re-evaluation of patient's conition.   Critical Care Condition: potentially life-threatening               ED Course as of Oct 08 0400   Thu Oct 01, 2020   1446 EKG 1439 atrial fibrillation with rapid ventricular response rate of 114.   T-waves are inverted in lead aVL.  T-waves are somewhat he is anteriorly in V2 through V6.  No STEMI.  EKG interpreted independently by me.    [JR]   1653 Discussed the case with Dr. Trevizo who recommended continuation of current care and did recommend Kcentra treatment.  He plans to scope her tomorrow morning    [JR]   1715 Patient is requesting to be put on BiPAP where she wants to leave against medical advice to be transferred somewhere else.  She is in no acute respiratory distress at this point, she has some crackles in her lungs exhibiting signs of mild volume overload.  Will place her BiPAP per her request      [JR]   1744 I discussed the case with Dr. Gupta who stated to call the dialysis unit out to come dialyze the patient he will take care the orders    [JR]      ED Course User Index  [JR] Zane Morales DO            Clinical Impression:       ICD-10-CM ICD-9-CM   1. Lower GI bleed  K92.2 578.9   2. Dyspnea  R06.00 786.09   3. Acute upper GI bleeding  K92.2 578.9   4. ESRD (end stage renal disease) on dialysis  N18.6 585.6    Z99.2 V45.11   5. Anemia, unspecified type  D64.9 285.9   6. Acute blood loss anemia  D62 285.1   7. A-fib  I48.91 427.31                          ED Disposition Condition    Admit                             Zane Morales DO  10/08/20 0401

## 2020-10-01 NOTE — H&P
[facility]  History & Physical    Patient Name: Griselda Neely  MRN: [id number]  Admission Date: 10/1/2020  Attending Physician: Chalino De Leon MD  Primary Care Provider: Kalie Neely MD         Patient information was obtained from patient, relative(s), past medical records and ER records.     Subjective:     Principal Problem:Acute blood loss anemia    Chief Complaint:   Chief Complaint   Patient presents with    Rectal Bleeding     blood in stool  had blood transfuion today        HPI:    This is a 75-year-old female with past medical history of end-stage renal disease on hemodialysis, atrial fibrillation on Coumadin, hypertension, peripheral vascular disease, calciphylaxis who presents emergency department with rectal bleeding.  She says that she was admitted last week to the hospital and have a upper endoscopy with a Cruz I something in her stomach.  She says that she has been fine up until yesterday where she had multiple episodes of black looking dark tarry type stool.  Today she has had 2-3 episodes as well.  She was told that her hemoglobin was low at and had an outpatient blood transfusion earlier today.  She received 1 unit packed red blood cells.  No other labs were checked.  INR was not checked today.  She denies vomiting any blood.  She denies any pain associated with this specifically no abdominal pain.  Says she is short of breath chronically and wears 3 L of oxygen with her COPD but no worse than usual.  I have spoken to Dr Powell/cardiology  Dr Morales/ED has spoen to GI and is following their recommendations  ED awaiting a call from Dr Ingram/nephrology      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    both legs    CHF (congestive heart failure)     Encounter for blood transfusion 03/2016    Gout     Hemodialysis access, AV graft     Hypertension     Mitral valve regurgitation      Osteoarthritis     Pancreatitis     Peripheral vascular disease     Pneumonia 09/09/2017    Renal failure        Past Surgical History:   Procedure Laterality Date    ACHILLES TENDON SURGERY Right     ANGIOGRAPHY OF ARTERIOVENOUS SHUNT Right 1/7/2020    Procedure: Fistulogram with Possible Intervention;  Surgeon: Joseph Loyola MD;  Location: [facility] CATH/EP LAB;  Service: Cardiology;  Laterality: Right;    ANGIOGRAPHY OF LOWER EXTREMITY Left 3/18/2020    Procedure: Angiogram Extremity Unilateral;  Surgeon: Ali Khoobehi, MD;  Location: [facility] CATH/EP LAB;  Service: Cardiology;  Laterality: Left;    APPENDECTOMY      CARDIAC CATHETERIZATION  07/03/2017    CHOLECYSTECTOMY      ESOPHAGOGASTRODUODENOSCOPY Left 8/17/2020    Procedure: EGD (ESOPHAGOGASTRODUODENOSCOPY);  Surgeon: Talat Trevizo MD;  Location: [facility] ENDO;  Service: Endoscopy;  Laterality: Left;    heal surgery Right     HYSTERECTOMY      INSERTION OF STENT INTO PERIPHERAL VESSEL N/A 1/7/2020    Procedure: INSERTION, STENT, VESSEL, PERIPHERAL;  Surgeon: Joseph Loyola MD;  Location: [facility] CATH/EP LAB;  Service: Cardiology;  Laterality: N/A;    MITRAL VALVE REPLACEMENT      PARATHYROIDECTOMY      PARATHYROIDECTOMY  07/13/2017    PERCUTANEOUS TRANSLUMINAL ANGIOPLASTY OF ARTERIOVENOUS FISTULA N/A 1/7/2020    Procedure: PTA, AV FISTULA;  Surgeon: Joseph Loyola MD;  Location: [facility] CATH/EP LAB;  Service: Cardiology;  Laterality: N/A;    PERITONEAL CATHETER INSERTION      RENAL BIOPSY      vocal cord nodule      WOUND DEBRIDEMENT Left 7/13/2020    Procedure: DEBRIDEMENT, WOUND;  Surgeon: Carlos Elam III, MD;  Location: [facility] OR;  Service: General;  Laterality: Left;       Review of patient's allergies indicates:  No Known Allergies    Current Facility-Administered Medications on File Prior to Encounter   Medication    0.9%  NaCl infusion (for blood administration)     Current Outpatient Medications on File Prior to Encounter    Medication Sig    calcium acetate (PHOSLO) 667 mg capsule Take 667 mg by mouth once daily.     diltiaZEM (CARDIZEM CD) 180 MG 24 hr capsule Take 180 mg by mouth once daily.    folic acid/vit B complex and C (JENNIFER-KODAK ORAL) Take 1 tablet by mouth once daily.    isosorbide mononitrate (IMDUR) 30 MG 24 hr tablet Take 30 mg by mouth once daily.    losartan (COZAAR) 25 MG tablet Take 25 mg by mouth once daily.    magnesium oxide (MAG-OX) 400 mg (241.3 mg magnesium) tablet Take 1 tablet by mouth once daily (Patient taking differently: Take 400 mg by mouth once daily. )    metoprolol succinate (TOPROL-XL) 25 MG 24 hr tablet Take 1 tablet (25 mg total) by mouth nightly.    ondansetron (ZOFRAN-ODT) 4 MG TbDL Take 4 mg by mouth every 6 (six) hours as needed.    oxyCODONE-acetaminophen (PERCOCET) 5-325 mg per tablet Take 1 tablet by mouth every 6 (six) hours as needed for Pain.    pantoprazole (PROTONIX) 40 MG tablet Take 1 tablet (40 mg total) by mouth once daily.    traMADoL (ULTRAM) 50 mg tablet Take 50 mg by mouth every 8 (eight) hours as needed for Pain.    zinc sulfate 220 mg Tab tablet Take 220 mg by mouth every other day.    [DISCONTINUED] aspirin (ECOTRIN) 81 MG EC tablet Take 81 mg by mouth once daily.    [DISCONTINUED] ipratropium (ATROVENT) 0.03 % nasal spray 2 sprays by Nasal route every 12 (twelve) hours.    [DISCONTINUED] warfarin (COUMADIN) 5 MG tablet Take 5 mg by mouth once daily.    wheelchair An 1 Device by Misc.(Non-Drug; Combo Route) route as needed (needed for transport).     Family History     Problem Relation (Age of Onset)    Arthritis Mother    Diabetes Mother, Father    Early death Sister, Sister    Heart disease Sister, Maternal Grandfather, Mother    Heart failure Mother    Hypertension Mother        Tobacco Use    Smoking status: Current Some Day Smoker     Packs/day: 0.50     Years: 45.00     Pack years: 22.50     Types: Cigarettes    Smokeless tobacco: Never Used    Substance and Sexual Activity    Alcohol use: No    Drug use: No    Sexual activity: Not on file     Review of Systems   Constitutional: Positive for diaphoresis and fatigue. Negative for chills and fever.   HENT: Negative.    Eyes: Negative.    Respiratory: Positive for shortness of breath and wheezing.    Cardiovascular: Negative.    Gastrointestinal: Positive for abdominal distention, anal bleeding, blood in stool and nausea. Negative for abdominal pain and vomiting.   Endocrine: Positive for cold intolerance and heat intolerance.   Genitourinary: Positive for decreased urine volume.   Musculoskeletal: Positive for arthralgias, gait problem and myalgias.   Neurological: Positive for dizziness, weakness and light-headedness.   Hematological: Bruises/bleeds easily.   Psychiatric/Behavioral: Positive for decreased concentration. The patient is nervous/anxious.      Objective:     Vital Signs (Most Recent):  Temp: 98.2 °F (36.8 °C) (10/01/20 1735)  Pulse: (!) 113 (10/01/20 1735)  Resp: (!) 28 (10/01/20 1735)  BP: (!) 146/75 (10/01/20 1735)  SpO2: 100 % (10/01/20 1735) Vital Signs (24h Range):  Temp:  [97.8 °F (36.6 °C)-98.7 °F (37.1 °C)] 98.2 °F (36.8 °C)  Pulse:  [] 113  Resp:  [18-28] 28  SpO2:  [95 %-100 %] 100 %  BP: (123-146)/(58-86) 146/75     Weight: 53.5 kg (118 lb)  Body mass index is 19.64 kg/m².    Physical Exam  Vitals signs and nursing note reviewed.   Constitutional:       General: She is not in acute distress.     Appearance: Normal appearance. She is not ill-appearing.   HENT:      Head: Normocephalic.      Nose: Nose normal.      Mouth/Throat:      Mouth: Mucous membranes are moist.   Eyes:      Extraocular Movements: Extraocular movements intact.      Pupils: Pupils are equal, round, and reactive to light.      Comments: Pale conjunctiva   Neck:      Musculoskeletal: Normal range of motion and neck supple.   Cardiovascular:      Rate and Rhythm: irregular rhythm with tachycardia     Heart  sounds:    Pulmonary:      Breath sounds: Rales present.      Comments: bibasilar  Abdominal:      General: There is distension.      Tenderness: There is no abdominal tenderness. There is no guarding or rebound.   Musculoskeletal: Normal range of motion.   Skin:     General: Skin is warm.   Neurological:      General: No focal deficit present.      Mental Status: She is alert.   Psychiatric:      Comments: Dulled affect           CRANIAL NERVES     CN III, IV, VI   Pupils are equal, round, and reactive to light.       Significant Labs:   Recent Lab Results       10/01/20  1527   10/01/20  1503   10/01/20  1443   10/01/20  1439        Unit Blood Type Code 7300[P]            7300[P]           Unit Expiration [id number][P]            [id number][P]           Unit Blood Type B POS[P]            B POS[P]           Albumin   2.6         Alkaline Phosphatase   94         ALT   16         Anion Gap   18         aPTT   46.8  Comment:  Therapeutic Range of 67.5-105.1 secs.  Equivalent to Heparin Concentration of anti-Xa 0.3-0.7 IU/ml.           AST   20         Baso #       0.03     Basophil%       0.5     BILIRUBIN TOTAL   0.7  Comment:  For infants and newborns, interpretation of results should be based  on gestational age, weight and in agreement with clinical  observations.  Premature Infant recommended reference ranges:  Up to 24 hours.............<8.0 mg/dL  Up to 48 hours............<12.0 mg/dL  3-5 days..................<15.0 mg/dL  6-29 days.................<15.0 mg/dL           BNP   >4,500  Comment:  Values of less than 100 pg/ml are consistent with non-CHF populations.         BUN, Bld   83         Calcium   7.8         Chloride   95         CO2   24         CODING SYSTEM UUXM764[P]            [id number][P]           Creatinine   4.7         Differential Method       Automated     DISPENSE STATUS CROSSMATCHED[P]            ISSUED[P]           eGFR if    9.9         eGFR if non     8.6  Comment:  Calculation used to obtain the estimated glomerular filtration  rate (eGFR) is the CKD-EPI equation.            Eos #       0.0     Eosinophil%       0.7     Glucose   107         Gran # (ANC)       4.4     Gran%       74.3     Group & Rh   B POS         Hematocrit       21.9     Hemoglobin       6.7  Comment:  Hgb critical result(s) repeated. Called and verbal readback obtained   from Mary Christianson RN/ED by JBSona 10/01/2020 15:23       Immature Grans (Abs)       0.02  Comment:  Mild elevation in immature granulocytes is non specific and   can be seen in a variety of conditions including stress response,   acute inflammation, trauma and pregnancy. Correlation with other   laboratory and clinical findings is essential.       Immature Granulocytes       0.3     INDIRECT YADIRA   NEG         INR   4.8  Comment:  Coumadin Therapy:  INR: 2.0-3.0 conventional anticoagulation  INR: 2.5-3.5 intensive anticoagulation           Lymph #       0.8     Lymph%       13.4     MCH       28.4     MCHC       30.6     MCV       93     Mono #       0.6     Mono%       10.8     MPV       10.4     nRBC       1     Platelets       167     Potassium   5.6         Product Code U7944G47[P]            [id number][P]           PROTEIN TOTAL   6.0         PT   43.5         RBC       2.36     RDW       18.7     SARS-CoV-2 RNA, Amplification, Qual     Negative  Comment:  This test utilizes isothermal nucleic acid amplification   technology to detect the SARS-CoV-2 RdRp nucleic acid segment.   The analytical sensitivity (limit of detection) is 125 genome   equivalents/mL.   A POSITIVE result implies infection with the SARS-CoV-2 virus;  the patient is presumed to be contagious.    A NEGATIVE result means that SARS-CoV-2 nucleic acids are not  present above the limit of detection. A NEGATIVE result should be   treated as presumptive. It does not rule out the possibility of   COVID-19 and should not be the sole basis for treatment  decisions.   If COVID-19 is strongly suspected based on clinical and exposure   history, re-testing using an alternate molecular assay should be   considered.   This test is only for use under the Food and Drug   Administration s Emergency Use Authorization (EUA).   Commercial kits are provided by [employer].   Performance characteristics of the EUA have been independently  verified by [facility] Department of  Pathology and Laboratory Medicine.   _________________________________________________________________  The ID NOW COVID-19 Letter of Authorization, along with the   authorized Fact Sheet for Healthcare Providers, the authorized Fact  Sheet for Patients, and authorized labeling are available on the FDA   website:  www.fda.gov/MedicalDevices/Safety/EmergencySituations/lnh561724.htm         Sodium   137         Troponin I   <0.030         UNIT NUMBER [id number][P]            [id number][P]           WBC       5.90           Significant Imaging: I have reviewed all pertinent imaging results/findings within the past 24 hours.    Assessment/Plan:   10/1/2020  A)  Acute blood loss anemia  Recurrent lower GI bleed in a dialysis patient with C/O dyspnea and bibasilar crackles after receiving blood products. Now on BIPAP  HB 6  COPD  Atrial fibrillation with RVR < 120  Pulmonary HTN  Chronic systolic CHF  PVD  Calciphylaxis  P)  Admit to ICU  Dialysis per Dr Oliva this PM  Transfuse Kaycentra per GI  Blood transfusions as indicated  GI and cardiology following  IV protonix  Xopenex treatments Q 8  Fentanyl for pain  BIPAP as indicated  Neuro checks Q 4  Xopenex treatments Q 8  44 minutes of critical care        No notes have been filed under this hospital service.  Service: Hospital Medicine    VTE Risk Mitigation (From admission, onward)    None             Chalino De Leon MD  Department of Hospital Medicine   Counts include 234 beds at the [facility]

## 2020-10-01 NOTE — PROGRESS NOTES
Pt tolerated 1 unit of PRBC without problems.  Refused to wait reacton time.   States wants to go home.  Discharged to home with son via wheelchair- to resume dialysis tomorrow.  Will follow up with MD

## 2020-10-01 NOTE — HPI
This is a 75-year-old female with past medical history of end-stage renal disease on hemodialysis, atrial fibrillation on Coumadin, hypertension, peripheral vascular disease, calciphylaxis who presents emergency department with rectal bleeding.  She says that she was admitted last week to the hospital and have a upper endoscopy with a Cruz I something in her stomach.  She says that she has been fine up until yesterday where she had multiple episodes of black looking dark tarry type stool.  Today she has had 2-3 episodes as well.  She was told that her hemoglobin was low at and had an outpatient blood transfusion earlier today.  She received 1 unit packed red blood cells.  No other labs were checked.  INR was not checked today.  She denies vomiting any blood.  She denies any pain associated with this specifically no abdominal pain.  Says she is short of breath chronically and wears 3 L of oxygen with her COPD but no worse than usual.  I have spoken to Dr Powell/cardiology  Dr Morales/ED has spoen to GI and is following their recommendations   ED awaiting a call from Dr Ingram/nephrology

## 2020-10-01 NOTE — ED NOTES
Pt c/o blood in stool x2 days/ Pt states this happened about 2 weeks ago as well. Pt denies hemoptysis. Pt is resting on stretcher, NAD, aaax3 and son is at bedside.

## 2020-10-02 ENCOUNTER — ANESTHESIA EVENT (OUTPATIENT)
Dept: SURGERY | Facility: HOSPITAL | Age: 75
DRG: 377 | End: 2020-10-02
Payer: MEDICARE

## 2020-10-02 ENCOUNTER — ANESTHESIA (OUTPATIENT)
Dept: SURGERY | Facility: HOSPITAL | Age: 75
DRG: 377 | End: 2020-10-02
Payer: MEDICARE

## 2020-10-02 LAB
ANION GAP SERPL CALC-SCNC: 11 MMOL/L (ref 8–16)
BASOPHILS # BLD AUTO: 0.01 K/UL (ref 0–0.2)
BASOPHILS NFR BLD: 0.2 % (ref 0–1.9)
BUN SERPL-MCNC: 43 MG/DL (ref 8–23)
CALCIUM SERPL-MCNC: 7.8 MG/DL (ref 8.7–10.5)
CHLORIDE SERPL-SCNC: 100 MMOL/L (ref 95–110)
CO2 SERPL-SCNC: 25 MMOL/L (ref 23–29)
CREAT SERPL-MCNC: 3.1 MG/DL (ref 0.5–1.4)
DIFFERENTIAL METHOD: ABNORMAL
EOSINOPHIL # BLD AUTO: 0.1 K/UL (ref 0–0.5)
EOSINOPHIL NFR BLD: 1.3 % (ref 0–8)
ERYTHROCYTE [DISTWIDTH] IN BLOOD BY AUTOMATED COUNT: 16.8 % (ref 11.5–14.5)
EST. GFR  (AFRICAN AMERICAN): 16.3 ML/MIN/1.73 M^2
EST. GFR  (NON AFRICAN AMERICAN): 14.2 ML/MIN/1.73 M^2
GLUCOSE SERPL-MCNC: 68 MG/DL (ref 70–110)
HCT VFR BLD AUTO: 26.2 % (ref 37–48.5)
HCT VFR BLD AUTO: 27.6 % (ref 37–48.5)
HCT VFR BLD AUTO: 29.5 % (ref 37–48.5)
HGB BLD-MCNC: 8.4 G/DL (ref 12–16)
HGB BLD-MCNC: 8.8 G/DL (ref 12–16)
HGB BLD-MCNC: 9.6 G/DL (ref 12–16)
IMM GRANULOCYTES # BLD AUTO: 0.02 K/UL (ref 0–0.04)
IMM GRANULOCYTES NFR BLD AUTO: 0.4 % (ref 0–0.5)
INR PPP: 2
LYMPHOCYTES # BLD AUTO: 0.7 K/UL (ref 1–4.8)
LYMPHOCYTES NFR BLD: 13.9 % (ref 18–48)
MCH RBC QN AUTO: 29.1 PG (ref 27–31)
MCHC RBC AUTO-ENTMCNC: 32.1 G/DL (ref 32–36)
MCV RBC AUTO: 91 FL (ref 82–98)
MONOCYTES # BLD AUTO: 0.6 K/UL (ref 0.3–1)
MONOCYTES NFR BLD: 10.7 % (ref 4–15)
NEUTROPHILS # BLD AUTO: 3.9 K/UL (ref 1.8–7.7)
NEUTROPHILS NFR BLD: 73.5 % (ref 38–73)
NRBC BLD-RTO: 0 /100 WBC
PLATELET # BLD AUTO: 147 K/UL (ref 150–350)
PMV BLD AUTO: 10.2 FL (ref 9.2–12.9)
POTASSIUM SERPL-SCNC: 4.1 MMOL/L (ref 3.5–5.1)
PROTHROMBIN TIME: 22 SEC (ref 10.6–14.8)
RBC # BLD AUTO: 2.89 M/UL (ref 4–5.4)
SODIUM SERPL-SCNC: 136 MMOL/L (ref 136–145)
WBC # BLD AUTO: 5.34 K/UL (ref 3.9–12.7)

## 2020-10-02 PROCEDURE — 93010 EKG 12-LEAD: ICD-10-PCS | Mod: ,,, | Performed by: INTERNAL MEDICINE

## 2020-10-02 PROCEDURE — 25000003 PHARM REV CODE 250: Performed by: INTERNAL MEDICINE

## 2020-10-02 PROCEDURE — 36415 COLL VENOUS BLD VENIPUNCTURE: CPT

## 2020-10-02 PROCEDURE — 85610 PROTHROMBIN TIME: CPT

## 2020-10-02 PROCEDURE — 63600175 PHARM REV CODE 636 W HCPCS: Performed by: INTERNAL MEDICINE

## 2020-10-02 PROCEDURE — 93010 ELECTROCARDIOGRAM REPORT: CPT | Mod: ,,, | Performed by: INTERNAL MEDICINE

## 2020-10-02 PROCEDURE — 43235 EGD DIAGNOSTIC BRUSH WASH: CPT | Performed by: INTERNAL MEDICINE

## 2020-10-02 PROCEDURE — 94640 AIRWAY INHALATION TREATMENT: CPT

## 2020-10-02 PROCEDURE — 99900035 HC TECH TIME PER 15 MIN (STAT)

## 2020-10-02 PROCEDURE — 25000003 PHARM REV CODE 250: Performed by: NURSE ANESTHETIST, CERTIFIED REGISTERED

## 2020-10-02 PROCEDURE — 85018 HEMOGLOBIN: CPT | Mod: 91

## 2020-10-02 PROCEDURE — C9113 INJ PANTOPRAZOLE SODIUM, VIA: HCPCS | Performed by: INTERNAL MEDICINE

## 2020-10-02 PROCEDURE — 20000000 HC ICU ROOM

## 2020-10-02 PROCEDURE — 37000008 HC ANESTHESIA 1ST 15 MINUTES: Performed by: INTERNAL MEDICINE

## 2020-10-02 PROCEDURE — 85014 HEMATOCRIT: CPT | Mod: 91

## 2020-10-02 PROCEDURE — 27000221 HC OXYGEN, UP TO 24 HOURS

## 2020-10-02 PROCEDURE — 63600175 PHARM REV CODE 636 W HCPCS: Performed by: NURSE ANESTHETIST, CERTIFIED REGISTERED

## 2020-10-02 PROCEDURE — 37000009 HC ANESTHESIA EA ADD 15 MINS: Performed by: INTERNAL MEDICINE

## 2020-10-02 PROCEDURE — 80048 BASIC METABOLIC PNL TOTAL CA: CPT

## 2020-10-02 PROCEDURE — 85025 COMPLETE CBC W/AUTO DIFF WBC: CPT

## 2020-10-02 PROCEDURE — 93005 ELECTROCARDIOGRAM TRACING: CPT | Performed by: INTERNAL MEDICINE

## 2020-10-02 PROCEDURE — 94761 N-INVAS EAR/PLS OXIMETRY MLT: CPT

## 2020-10-02 PROCEDURE — 99900031 HC PATIENT EDUCATION (STAT)

## 2020-10-02 PROCEDURE — 90935 HEMODIALYSIS ONE EVALUATION: CPT

## 2020-10-02 PROCEDURE — 25000242 PHARM REV CODE 250 ALT 637 W/ HCPCS: Performed by: INTERNAL MEDICINE

## 2020-10-02 RX ORDER — SODIUM THIOSULFATE 250 MG/ML
0.25 INJECTION, SOLUTION INTRAVENOUS ONCE
Status: DISCONTINUED | OUTPATIENT
Start: 2020-10-02 | End: 2020-10-02

## 2020-10-02 RX ORDER — SODIUM THIOSULFATE 250 MG/ML
25 INJECTION, SOLUTION INTRAVENOUS
Status: DISCONTINUED | OUTPATIENT
Start: 2020-10-02 | End: 2020-10-04 | Stop reason: HOSPADM

## 2020-10-02 RX ORDER — SODIUM THIOSULFATE 250 MG/ML
25 INJECTION, SOLUTION INTRAVENOUS ONCE
Status: DISCONTINUED | OUTPATIENT
Start: 2020-10-02 | End: 2020-10-02

## 2020-10-02 RX ORDER — LIDOCAINE HYDROCHLORIDE 20 MG/ML
INJECTION, SOLUTION EPIDURAL; INFILTRATION; INTRACAUDAL; PERINEURAL
Status: DISCONTINUED | OUTPATIENT
Start: 2020-10-02 | End: 2020-10-02

## 2020-10-02 RX ORDER — METOPROLOL SUCCINATE 25 MG/1
25 TABLET, EXTENDED RELEASE ORAL DAILY
Status: DISCONTINUED | OUTPATIENT
Start: 2020-10-02 | End: 2020-10-04

## 2020-10-02 RX ORDER — SODIUM CHLORIDE 9 MG/ML
INJECTION, SOLUTION INTRAVENOUS
Status: DISCONTINUED | OUTPATIENT
Start: 2020-10-02 | End: 2020-10-04 | Stop reason: HOSPADM

## 2020-10-02 RX ORDER — SODIUM CHLORIDE 9 MG/ML
INJECTION, SOLUTION INTRAVENOUS ONCE
Status: DISCONTINUED | OUTPATIENT
Start: 2020-10-02 | End: 2020-10-04 | Stop reason: HOSPADM

## 2020-10-02 RX ORDER — OXYCODONE AND ACETAMINOPHEN 5; 325 MG/1; MG/1
1 TABLET ORAL EVERY 4 HOURS PRN
Status: DISCONTINUED | OUTPATIENT
Start: 2020-10-02 | End: 2020-10-04 | Stop reason: HOSPADM

## 2020-10-02 RX ORDER — PHENYLEPHRINE HYDROCHLORIDE 10 MG/ML
INJECTION INTRAVENOUS
Status: DISCONTINUED | OUTPATIENT
Start: 2020-10-02 | End: 2020-10-02

## 2020-10-02 RX ORDER — PROPOFOL 10 MG/ML
VIAL (ML) INTRAVENOUS
Status: DISCONTINUED | OUTPATIENT
Start: 2020-10-02 | End: 2020-10-02

## 2020-10-02 RX ADMIN — EPOETIN ALFA-EPBX 5400 UNITS: 10000 INJECTION, SOLUTION INTRAVENOUS; SUBCUTANEOUS at 01:10

## 2020-10-02 RX ADMIN — CALCIUM ACETATE 667 MG: 667 CAPSULE ORAL at 08:10

## 2020-10-02 RX ADMIN — PHENYLEPHRINE HYDROCHLORIDE 200 MCG: 10 INJECTION INTRAVENOUS at 07:10

## 2020-10-02 RX ADMIN — FENTANYL CITRATE 25 MCG: 50 INJECTION, SOLUTION INTRAMUSCULAR; INTRAVENOUS at 03:10

## 2020-10-02 RX ADMIN — SODIUM THIOSULFATE 25 G: 250 INJECTION, SOLUTION INTRAVENOUS at 10:10

## 2020-10-02 RX ADMIN — PANTOPRAZOLE SODIUM 8 MG/HR: 40 INJECTION, POWDER, FOR SOLUTION INTRAVENOUS at 04:10

## 2020-10-02 RX ADMIN — LEVALBUTEROL HYDROCHLORIDE 0.63 MG: 0.63 SOLUTION RESPIRATORY (INHALATION) at 08:10

## 2020-10-02 RX ADMIN — PHENYLEPHRINE HYDROCHLORIDE 100 MCG: 10 INJECTION INTRAVENOUS at 07:10

## 2020-10-02 RX ADMIN — MUPIROCIN: 20 OINTMENT TOPICAL at 09:10

## 2020-10-02 RX ADMIN — DILTIAZEM HYDROCHLORIDE 5 MG/HR: 5 INJECTION INTRAVENOUS at 05:10

## 2020-10-02 RX ADMIN — PANTOPRAZOLE SODIUM 8 MG/HR: 40 INJECTION, POWDER, FOR SOLUTION INTRAVENOUS at 09:10

## 2020-10-02 RX ADMIN — METOPROLOL SUCCINATE 25 MG: 25 TABLET, FILM COATED, EXTENDED RELEASE ORAL at 01:10

## 2020-10-02 RX ADMIN — PROPOFOL 40 MG: 10 INJECTION, EMULSION INTRAVENOUS at 07:10

## 2020-10-02 RX ADMIN — MUPIROCIN: 20 OINTMENT TOPICAL at 08:10

## 2020-10-02 RX ADMIN — LEVALBUTEROL HYDROCHLORIDE 0.63 MG: 0.63 SOLUTION RESPIRATORY (INHALATION) at 01:10

## 2020-10-02 RX ADMIN — PROPOFOL 60 MG: 10 INJECTION, EMULSION INTRAVENOUS at 07:10

## 2020-10-02 RX ADMIN — SIMETHICONE 30 ML: 20 SUSPENSION/ DROPS ORAL at 07:10

## 2020-10-02 RX ADMIN — PANTOPRAZOLE SODIUM 8 MG/HR: 40 INJECTION, POWDER, FOR SOLUTION INTRAVENOUS at 11:10

## 2020-10-02 RX ADMIN — OXYCODONE AND ACETAMINOPHEN 1 TABLET: 5; 325 TABLET ORAL at 09:10

## 2020-10-02 RX ADMIN — LIDOCAINE HYDROCHLORIDE 60 MG: 20 INJECTION, SOLUTION INTRAVENOUS at 07:10

## 2020-10-02 NOTE — PROGRESS NOTES
[facility]  Cardiology  Progress Note    Patient Name: Griselda Neely  MRN: [id number]  Admission Date: 10/1/2020  Hospital Length of Stay: 1 days  Code Status: DNR   Attending Physician: Hola Rubio MD   Primary Care Physician: Kalie Neely MD  Expected Discharge Date:   Principal Problem:Acute blood loss anemia    Subjective:       Interval History: She states that the SOB is better. She denies any chest pain. She was dialyzed yesterday.     ROS   Denies any focal weakness.   Reports left hand pain at the site of IV infusion.   Objective:     Vital Signs (Most Recent):  Temp: 97.6 °F (36.4 °C) (10/02/20 1501)  Pulse: (!) 111 (10/02/20 1531)  Resp: 19 (10/02/20 1531)  BP: (!) 152/77 (10/02/20 1531)  SpO2: 100 % (10/02/20 1531) Vital Signs (24h Range):  Temp:  [97.6 °F (36.4 °C)-98.7 °F (37.1 °C)] 97.6 °F (36.4 °C)  Pulse:  [] 111  Resp:  [15-32] 19  SpO2:  [50 %-100 %] 100 %  BP: ()/() 152/77     Weight: 53.5 kg (118 lb)  Body mass index is 19.64 kg/m².    SpO2: 100 %  O2 Device (Oxygen Therapy): nasal cannula      Intake/Output Summary (Last 24 hours) at 10/2/2020 1652  Last data filed at 10/2/2020 1230  Gross per 24 hour   Intake 2500 ml   Output 5440 ml   Net -2940 ml       Lines/Drains/Airways     Drain                 Hemodialysis AV Fistula 08/08/19 0214 Right upper arm 421 days          Peripheral Intravenous Line                 Peripheral IV - Single Lumen 10/01/20 1430 22 G Left Hand 1 day         Peripheral IV - Single Lumen 10/02/20 0749 20 G Anterior;Distal;Left Wrist less than 1 day                Scheduled Meds:   sodium chloride 0.9%   Intravenous Once    sodium chloride 0.9%   Intravenous Once    calcium acetate(phosphat bind)  667 mg Oral Daily    epoetin alfonzo-epbx  100 Units/kg Subcutaneous Every Mon, Wed, Fri    human prothrombin complex (PCC)  35 Units/kg Intravenous Once    levalbuterol  0.63 mg Nebulization TID WAKE    metoprolol  succinate  25 mg Oral Daily    mupirocin   Nasal BID    sodium thiosulfate  25 g Intravenous Every Mon, Wed, Fri     Continuous Infusions:   dilTIAZem 5 mg/hr (10/01/20 2300)    pantoprazole 40 mg in dextrose 5 % 100 mL infusion (ready to mix system) 8 mg/hr (10/02/20 1608)     PRN Meds:.sodium chloride, sodium chloride, sodium chloride 0.9%, sodium chloride 0.9%, fentaNYL, ondansetron, oxyCODONE-acetaminophen, promethazine (PHENERGAN) IVPB, sodium chloride 0.9%     Physical Exam  HEENT: Normocephalic, atraumatic, PERRL, Conjunctiva pink, no scleral icterus.   CVS: S1S2+, iRRR, no murmurs, rubs or gallops, JVP: Normal.  LUNGS: Crackles+  ABDOMEN: Soft, NT, BS+  EXTREMITIES: No cyanosis, clubbing or edema  NEURO: Alert and answers simple questions appropriately.     Significant Labs:   BMP:   Recent Labs   Lab 10/01/20  1503 10/02/20  0504    68*    136   K 5.6* 4.1   CL 95 100   CO2 24 25   BUN 83* 43*   CREATININE 4.7* 3.1*   CALCIUM 7.8* 7.8*   , CMP   Recent Labs   Lab 10/01/20  1503 10/02/20  0504    136   K 5.6* 4.1   CL 95 100   CO2 24 25    68*   BUN 83* 43*   CREATININE 4.7* 3.1*   CALCIUM 7.8* 7.8*   PROT 6.0  --    ALBUMIN 2.6*  --    BILITOT 0.7  --    ALKPHOS 94  --    AST 20  --    ALT 16  --    ANIONGAP 18* 11   ESTGFRAFRICA 9.9* 16.3*   EGFRNONAA 8.6* 14.2*   , CBC   Recent Labs   Lab 10/01/20  1439  10/02/20  0504 10/02/20  1043 10/02/20  1434   WBC 5.90  --  5.34  --   --    HGB 6.7*   < > 8.4* 9.6* 8.8*   HCT 21.9*   < > 26.2* 29.5* 27.6*     --  147*  --   --     < > = values in this interval not displayed.   , INR   Recent Labs   Lab 10/01/20  1503 10/02/20  0504   INR 4.8 2.0   , Lipid Panel No results for input(s): CHOL, HDL, LDLCALC, TRIG, CHOLHDL in the last 48 hours. and Troponin   Recent Labs   Lab 10/01/20  1503   TROPONINI <0.030       Significant Imaging: Reviewed.   Assessment and Plan:     IMPRESSION:  GI bleed. S/p EGD - unremarkable.      Acute on  chronic blood loss anemia. H&H better and stable.     Congestive heart failure. Reduced LV systolic function.  Volume overload on exam.     Mitral regurgitation.  Severe.  With severe calcification.  Not a candidate for percutaneous interventions.  After discussion with Dr. Lora, she decided to be treated only with medical management. Newly reduced LVEF to 35-40% on 4/11/2019. Status post mitral valve replacement with a size 25 mm [employer] bovine pericardial prosthesis via right thoracotomy on by Dr. Lora 3/21/2019.      Atrial fibrillation.  Persistent. Currently in atrial fibrillation with RVR.  Coumadin on hold due to bleeding.        End-stage renal disease on hemodialysis.  MWF. Follows with Dr. Ingram.     Tobacco abuse.  Ongoing despite counseling.     Left atrial thrombus. Improved per last TRUPTI.      CODE STATUS was addressed on 10/1/2020 and the patient was alert, oriented ×3.  She preferred to be a DNR/DNI.  CODE STATUS was addressed in the presence of her sister and son.     PLAN:  1. Start cardizem GTT.   2. Resumed Toprol at 25 mg daily. Increase to 50 mg tomorrow if BP stable.   3. Discussed with Dr Rubio.       Katharina Powell MD  Cardiology  [facility]

## 2020-10-02 NOTE — PLAN OF CARE
Dialysis completed. EGD done with no signs of bleeding, colonoscopy to be done when INR is wnl. Full liquid diet with little intake noted. A-Fib on monitor with cardizem drip infusing. Left leg painful with dressing change ronni be needed concetta  orrow.

## 2020-10-02 NOTE — CONSULTS
[facility]  Cardiology  Consult Note    Patient Name: Griselda Neely  MRN: [id number]  Admission Date: 10/1/2020  Hospital Length of Stay: 0 days  Code Status: Full Code   Attending Provider: Chalino De Leon MD   Consulting Provider: Katharina Powell MD  Primary Care Physician: Kalie Neely MD  Principal Problem:Acute blood loss anemia    Patient information was obtained from patient, past medical records and ER records.     Consults  Subjective:     REASON FOR CONSULT:  GI bleed.      HPI:   75-year-old female with past medical history significant for end-stage renal disease on hemodialysis, atrial fibrillation, left atrial appendage thrombus on Coumadin, hypertension, peripheral vascular disease, calciphylaxis presents emergency department with rectal bleeding.  She was admitted to the hospital in August with GI bleed at which time she had an EGD which showed AV malformations which were ablated.  She says that she has been fine up until yesterday where she had multiple episodes of black looking dark tarry type stool.  Today she has had 2-3 episodes as well.  She was told that her hemoglobin was low at and had an outpatient blood transfusion earlier today.    She currently reports shortness of breath which is at her baseline.  She denies any chest pain.  She denies any dizzy spells.  She denies any syncopal episodes.    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    both legs    CHF (congestive heart failure)     Encounter for blood transfusion 03/2016    Gout     Hemodialysis access, AV graft     Hypertension     Mitral valve regurgitation     Osteoarthritis     Pancreatitis     Peripheral vascular disease     Pneumonia 09/09/2017    Renal failure        Past Surgical History:   Procedure Laterality Date    ACHILLES TENDON SURGERY Right     ANGIOGRAPHY OF ARTERIOVENOUS SHUNT Right 1/7/2020    Procedure: Fistulogram  with Possible Intervention;  Surgeon: Joseph Loyola MD;  Location: [facility] CATH/EP LAB;  Service: Cardiology;  Laterality: Right;    ANGIOGRAPHY OF LOWER EXTREMITY Left 3/18/2020    Procedure: Angiogram Extremity Unilateral;  Surgeon: Ali Khoobehi, MD;  Location: [facility] CATH/EP LAB;  Service: Cardiology;  Laterality: Left;    APPENDECTOMY      CARDIAC CATHETERIZATION  07/03/2017    CHOLECYSTECTOMY      ESOPHAGOGASTRODUODENOSCOPY Left 8/17/2020    Procedure: EGD (ESOPHAGOGASTRODUODENOSCOPY);  Surgeon: Talat Trevizo MD;  Location: [facility] ENDO;  Service: Endoscopy;  Laterality: Left;    heal surgery Right     HYSTERECTOMY      INSERTION OF STENT INTO PERIPHERAL VESSEL N/A 1/7/2020    Procedure: INSERTION, STENT, VESSEL, PERIPHERAL;  Surgeon: Joseph Loyola MD;  Location: [facility] CATH/EP LAB;  Service: Cardiology;  Laterality: N/A;    MITRAL VALVE REPLACEMENT      PARATHYROIDECTOMY      PARATHYROIDECTOMY  07/13/2017    PERCUTANEOUS TRANSLUMINAL ANGIOPLASTY OF ARTERIOVENOUS FISTULA N/A 1/7/2020    Procedure: PTA, AV FISTULA;  Surgeon: Joseph Loyola MD;  Location: [facility] CATH/EP LAB;  Service: Cardiology;  Laterality: N/A;    PERITONEAL CATHETER INSERTION      RENAL BIOPSY      vocal cord nodule      WOUND DEBRIDEMENT Left 7/13/2020    Procedure: DEBRIDEMENT, WOUND;  Surgeon: Carlos Elam III, MD;  Location: [facility] OR;  Service: General;  Laterality: Left;       Review of patient's allergies indicates:  No Known Allergies    No current facility-administered medications on file prior to encounter.      Current Outpatient Medications on File Prior to Encounter   Medication Sig    calcium acetate (PHOSLO) 667 mg capsule Take 667 mg by mouth once daily.     diltiaZEM (CARDIZEM CD) 180 MG 24 hr capsule Take 180 mg by mouth once daily.    folic acid/vit B complex and C (JENNIFER-KODAK ORAL) Take 1 tablet by mouth once daily.    isosorbide mononitrate (IMDUR) 30 MG 24 hr tablet Take 30 mg by mouth once daily.     losartan (COZAAR) 25 MG tablet Take 25 mg by mouth once daily.    magnesium oxide (MAG-OX) 400 mg (241.3 mg magnesium) tablet Take 1 tablet by mouth once daily (Patient taking differently: Take 400 mg by mouth once daily. )    metoprolol succinate (TOPROL-XL) 25 MG 24 hr tablet Take 1 tablet (25 mg total) by mouth nightly.    ondansetron (ZOFRAN-ODT) 4 MG TbDL Take 4 mg by mouth every 6 (six) hours as needed.    oxyCODONE-acetaminophen (PERCOCET) 5-325 mg per tablet Take 1 tablet by mouth every 6 (six) hours as needed for Pain.    pantoprazole (PROTONIX) 40 MG tablet Take 1 tablet (40 mg total) by mouth once daily.    traMADoL (ULTRAM) 50 mg tablet Take 50 mg by mouth every 8 (eight) hours as needed for Pain.    zinc sulfate 220 mg Tab tablet Take 220 mg by mouth every other day.    [DISCONTINUED] aspirin (ECOTRIN) 81 MG EC tablet Take 81 mg by mouth once daily.    [DISCONTINUED] ipratropium (ATROVENT) 0.03 % nasal spray 2 sprays by Nasal route every 12 (twelve) hours.    [DISCONTINUED] warfarin (COUMADIN) 5 MG tablet Take 5 mg by mouth once daily.    wheelchair An 1 Device by Misc.(Non-Drug; Combo Route) route as needed (needed for transport).       Scheduled Meds:   sodium chloride 0.9%   Intravenous Once    [START ON 10/2/2020] calcium acetate(phosphat bind)  667 mg Oral Daily    human prothrombin complex (PCC)  35 Units/kg Intravenous Once    levalbuterol  0.63 mg Nebulization TID WAKE    mupirocin   Nasal BID     Continuous Infusions:   pantoprazole 40 mg in dextrose 5 % 100 mL infusion (ready to mix system) 8 mg/hr (10/01/20 1530)     PRN Meds:.sodium chloride, sodium chloride, sodium chloride 0.9%, fentaNYL, ondansetron, promethazine (PHENERGAN) IVPB, sodium chloride 0.9%    Family History     Problem Relation (Age of Onset)    Arthritis Mother    Diabetes Mother, Father    Early death Sister, Sister    Heart disease Sister, Maternal Grandfather, Mother    Heart failure Mother     Hypertension Mother          Tobacco Use    Smoking status: Current Some Day Smoker     Packs/day: 0.50     Years: 45.00     Pack years: 22.50     Types: Cigarettes    Smokeless tobacco: Never Used   Substance and Sexual Activity    Alcohol use: No    Drug use: No    Sexual activity: Not on file       ROS   No significant headaches or sore throat or runny nose.   No recent changes in vision.   No recent changes in hearing.  No dysphagia or odynophagia.  Reportsshortness of breath at baseline.   Denies any cough or hemoptysis.   Denies any abdominal pain, nausea, vomiting, diarrhea or constipation.   Denies any dysuria or polyuria.   Denies any fevers or chills.   Denies any recent significant weight changes.   Denies bleeding diathesis         Objective:     Vital Signs (Most Recent):  Temp: 98.2 °F (36.8 °C) (10/01/20 1750)  Pulse: (!) 115 (10/01/20 1818)  Resp: (!) 30 (10/01/20 1818)  BP: 128/81 (10/01/20 1750)  SpO2: 100 % (10/01/20 1818) Vital Signs (24h Range):  Temp:  [97.8 °F (36.6 °C)-98.7 °F (37.1 °C)] 98.2 °F (36.8 °C)  Pulse:  [] 115  Resp:  [18-30] 30  SpO2:  [95 %-100 %] 100 %  BP: (123-146)/(58-86) 128/81     Weight: 53.5 kg (118 lb)  Body mass index is 19.64 kg/m².    SpO2: 100 %  O2 Device (Oxygen Therapy): (P) nasal cannula      Intake/Output Summary (Last 24 hours) at 10/1/2020 2139  Last data filed at 10/1/2020 2047  Gross per 24 hour   Intake 300 ml   Output --   Net 300 ml       Lines/Drains/Airways     Drain                 Hemodialysis AV Fistula 08/08/19 0214 Right upper arm 420 days          Peripheral Intravenous Line                 Peripheral IV - Single Lumen 10/01/20 1430 22 G Left Hand less than 1 day                Physical Exam  HEENT: Normocephalic, atraumatic, PERRL, Conjunctiva pink, no scleral icterus.   CVS: S1S2+, iRRR, no murmurs, rubs or gallops, JVP: Normal.  LUNGS: Crackles+  ABDOMEN: Soft, NT, BS+  EXTREMITIES: No cyanosis, clubbing or edema  NEURO: AAO  X3.    Significant Labs:   BMP:   Recent Labs   Lab 10/01/20  1503         K 5.6*   CL 95   CO2 24   BUN 83*   CREATININE 4.7*   CALCIUM 7.8*   , CMP   Recent Labs   Lab 10/01/20  1503      K 5.6*   CL 95   CO2 24      BUN 83*   CREATININE 4.7*   CALCIUM 7.8*   PROT 6.0   ALBUMIN 2.6*   BILITOT 0.7   ALKPHOS 94   AST 20   ALT 16   ANIONGAP 18*   ESTGFRAFRICA 9.9*   EGFRNONAA 8.6*   , CBC   Recent Labs   Lab 10/01/20  1439   WBC 5.90   HGB 6.7*   HCT 21.9*      , INR   Recent Labs   Lab 10/01/20  1503   INR 4.8   , Lipid Panel No results for input(s): CHOL, HDL, LDLCALC, TRIG, CHOLHDL in the last 48 hours. and Troponin   Recent Labs   Lab 10/01/20  1503   TROPONINI <0.030       Significant Imaging: Reviewed  Assessment and Plan:     IMPRESSION:  GI bleed.      Acute on chronic blood loss anemia. H&H better and stable.      Congestive heart failure. Reduced LV systolic function.  Volume overload on exam.     Mitral regurgitation.  Severe.  With severe calcification.  Not a candidate for percutaneous interventions.  After discussion with Dr. Lora, she decided to be treated only with medical management. Newly reduced LVEF to 35-40% on 4/11/2019. Status post mitral valve replacement with a size 25 mm [employer] bovine pericardial prosthesis via right thoracotomy on by Dr. Lora 3/21/2019.      Atrial fibrillation.  Persistent. Currently in atrial fibrillation with RVR.  Coumadin on hold due to bleeding.        End-stage renal disease on hemodialysis.  MWF. Follows with Dr. Ingram.     Tobacco abuse.  Ongoing despite counseling.     Left atrial thrombus. Improved per last TRUPTI.      CODE STATUS was addressed on 10/1/2020 and the patient was alert, oriented ×3.  She preferred to be a DNR/DNI.  CODE STATUS was addressed in the presence of her sister and son.       RECOMMENDATIONS:  1. Give Vitamin K. hold Coumadin.  2. Plans noted for HD today.   3.  Continue her home medical  regimen otherwise.    Thank you for your consult. I will follow-up with patient. Please contact us if you have any additional questions.    Katharina Powell MD  Cardiology   [facility]

## 2020-10-02 NOTE — CONSULTS
[facility]  Nephrology  Consult Note    Patient Name: Griselda Neely  MRN: [id number]  Admission Date: 10/1/2020  Hospital Length of Stay: 1 days  Attending Provider: Hola Rubio MD   Primary Care Physician: Kalie Neely MD  Principal Problem:Acute blood loss anemia    Consults  Subjective:     HPI: Griselda Neely is a 75-year-old female who arrived at [facility] on 10/1 for evaluation of 2-3 day history of rectal bleeding/dark tarry stool.  PMHx including: ESRD, A-Fib, Htn, PVD, and Calciphylaxis.  She was given one Unit PRBC outpatient-Dialysis one day prior to arrival. She underwent EGD in August with ablation of several AVM's. EGD performed today/unremarkable.  Scheduled for colonoscopy for Sunday to allow INR to trend down and stabilize A-fib.  Total of 3 Units PRBC given.  Post Hgb 8.8g/dL.  Nephrology is consulted for ESRD management.  She attends [facility] on M-W-F schedule.  Her Nephrologist is Dr. Ingram.   Seen and examined while getting dialyzed today.  She has a very well established left upper arm AVF.            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    both legs    CHF (congestive heart failure)     Encounter for blood transfusion 03/2016    Gout     Hemodialysis access, AV graft     Hypertension     Mitral valve regurgitation     Osteoarthritis     Pancreatitis     Peripheral vascular disease     Pneumonia 09/09/2017    Renal failure        Past Surgical History:   Procedure Laterality Date    ACHILLES TENDON SURGERY Right     ANGIOGRAPHY OF ARTERIOVENOUS SHUNT Right 1/7/2020    Procedure: Fistulogram with Possible Intervention;  Surgeon: Joseph Loyola MD;  Location: [facility] CATH/EP LAB;  Service: Cardiology;  Laterality: Right;    ANGIOGRAPHY OF LOWER EXTREMITY Left 3/18/2020    Procedure: Angiogram Extremity Unilateral;  Surgeon: Ali Khoobehi, MD;  Location: [facility] CATH/EP LAB;   Service: Cardiology;  Laterality: Left;    APPENDECTOMY      CARDIAC CATHETERIZATION  07/03/2017    CHOLECYSTECTOMY      ESOPHAGOGASTRODUODENOSCOPY Left 8/17/2020    Procedure: EGD (ESOPHAGOGASTRODUODENOSCOPY);  Surgeon: Talat Trevizo MD;  Location: [facility] ENDO;  Service: Endoscopy;  Laterality: Left;    heal surgery Right     HYSTERECTOMY      INSERTION OF STENT INTO PERIPHERAL VESSEL N/A 1/7/2020    Procedure: INSERTION, STENT, VESSEL, PERIPHERAL;  Surgeon: Joseph Loyola MD;  Location: [facility] CATH/EP LAB;  Service: Cardiology;  Laterality: N/A;    MITRAL VALVE REPLACEMENT      PARATHYROIDECTOMY      PARATHYROIDECTOMY  07/13/2017    PERCUTANEOUS TRANSLUMINAL ANGIOPLASTY OF ARTERIOVENOUS FISTULA N/A 1/7/2020    Procedure: PTA, AV FISTULA;  Surgeon: Joseph Loyola MD;  Location: [facility] CATH/EP LAB;  Service: Cardiology;  Laterality: N/A;    PERITONEAL CATHETER INSERTION      RENAL BIOPSY      vocal cord nodule      WOUND DEBRIDEMENT Left 7/13/2020    Procedure: DEBRIDEMENT, WOUND;  Surgeon: Carlos Elam III, MD;  Location: [facility] OR;  Service: General;  Laterality: Left;       Review of patient's allergies indicates:  No Known Allergies  Current Facility-Administered Medications   Medication Frequency    0.9%  NaCl infusion (for blood administration) Q24H PRN    0.9%  NaCl infusion (for blood administration) Q24H PRN    0.9%  NaCl infusion PRN    0.9%  NaCl infusion Once    0.9%  NaCl infusion PRN    0.9%  NaCl infusion Once    calcium acetate(phosphat bind) capsule 667 mg Daily    diltiaZEM 125 mg in D5W 125 mL infusion Continuous    epoetin alfonzo-epbx injection 5,400 Units Every Mon, Wed, Fri    fentaNYL injection 25 mcg Q2H PRN    human prothrombin complex (PCC) (KCENTRA) 1,872.5 Units in sterile water for injection IVPB Once    levalbuterol nebulizer solution 0.63 mg TID WAKE    metoprolol succinate (TOPROL-XL) 24 hr tablet 25 mg Daily    mupirocin 2 % ointment BID    ondansetron  disintegrating tablet 8 mg Q8H PRN    oxyCODONE-acetaminophen 5-325 mg per tablet 1 tablet Q4H PRN    pantoprazole 40 mg in dextrose 5 % 100 mL infusion (ready to mix system) Continuous    promethazine (PHENERGAN) 6.25 mg in dextrose 5 % 50 mL IVPB Q6H PRN    sodium chloride 0.9% flush 10 mL PRN    sodium thiosulfate 12.5 gram/50 mL (250 mg/mL) injection 25 g Every Mon, Wed, Fri     Family History     Problem Relation (Age of Onset)    Arthritis Mother    Diabetes Mother, Father    Early death Sister, Sister    Heart disease Sister, Maternal Grandfather, Mother    Heart failure Mother    Hypertension Mother        Tobacco Use    Smoking status: Current Some Day Smoker     Packs/day: 0.50     Years: 45.00     Pack years: 22.50     Types: Cigarettes    Smokeless tobacco: Never Used   Substance and Sexual Activity    Alcohol use: No    Drug use: No    Sexual activity: Not on file     Review of Systems   Constitutional: Positive for fatigue. Negative for chills and fever.   HENT: Negative.    Respiratory: Positive for shortness of breath. Negative for wheezing and stridor.    Cardiovascular: Negative.  Negative for chest pain.   Gastrointestinal: Positive for blood in stool. Negative for diarrhea, nausea, rectal pain and vomiting.        Abdominal cramping on HD   Genitourinary: Negative for flank pain.        Anuric   Musculoskeletal: Negative.    Neurological: Negative.    Psychiatric/Behavioral: Positive for agitation. The patient is nervous/anxious.      Objective:     Vital Signs (Most Recent):  Temp: 97.6 °F (36.4 °C) (10/02/20 1230)  Pulse: (!) 117 (10/02/20 1331)  Resp: 17 (10/02/20 1331)  BP: (!) 115/91 (10/02/20 1331)  SpO2: 100 % (10/02/20 1331)  O2 Device (Oxygen Therapy): nasal cannula (10/02/20 1310) Vital Signs (24h Range):  Temp:  [97.6 °F (36.4 °C)-98.7 °F (37.1 °C)] 97.6 °F (36.4 °C)  Pulse:  [] 117  Resp:  [15-32] 17  SpO2:  [50 %-100 %] 100 %  BP: ()/() 115/91     Weight:  53.5 kg (118 lb) (10/01/20 1353)  Body mass index is 19.64 kg/m².  Body surface area is 1.57 meters squared.    I/O last 3 completed shifts:  In: 2000 [P.O.:200; I.V.:100; Blood:900; Other:800]  Out: 2440 [Other:2440]    Physical Exam  Vitals signs and nursing note reviewed.   Constitutional:       General: She is not in acute distress.     Appearance: Normal appearance. She is ill-appearing.   HENT:      Head: Normocephalic and atraumatic.      Nose: Nose normal.      Mouth/Throat:      Mouth: Mucous membranes are moist.   Eyes:      General: No scleral icterus.     Pupils: Pupils are equal, round, and reactive to light.   Neck:      Musculoskeletal: Normal range of motion and neck supple.      Comments: No JVD  Cardiovascular:      Rate and Rhythm: Tachycardia present. Rhythm irregular.      Pulses: Normal pulses.   Pulmonary:      Effort: Pulmonary effort is normal.      Comments: Scattered crackles/rhonci throughout-non-rebreather  Abdominal:      General: Bowel sounds are normal. There is distension.      Palpations: Abdomen is soft.      Tenderness: There is no abdominal tenderness.   Musculoskeletal:      Right lower leg: No edema.      Left lower leg: No edema.   Skin:     General: Skin is warm and dry.      Comments: Left upper arm AVF   Neurological:      Mental Status: She is alert and oriented to person, place, and time.         Significant Labs:  CBC:   Recent Labs   Lab 10/02/20  0504  10/02/20  1434   WBC 5.34  --   --    RBC 2.89*  --   --    HGB 8.4*   < > 8.8*   HCT 26.2*   < > 27.6*   *  --   --    MCV 91  --   --    MCH 29.1  --   --    MCHC 32.1  --   --     < > = values in this interval not displayed.     CMP:   Recent Labs   Lab 10/01/20  1503 10/02/20  0504    68*   CALCIUM 7.8* 7.8*   ALBUMIN 2.6*  --    PROT 6.0  --     136   K 5.6* 4.1   CO2 24 25   CL 95 100   BUN 83* 43*   CREATININE 4.7* 3.1*   ALKPHOS 94  --    ALT 16  --    AST 20  --    BILITOT 0.7  --      All  labs within the past 24 hours have been reviewed.    Significant Imaging:      Assessment/Plan:     Active Diagnoses:    Diagnosis Date Noted POA    PRINCIPAL PROBLEM:  Acute blood loss anemia [D62] 10/01/2020 Yes    Acute upper GI bleeding [K92.2] 10/01/2020 Yes    Lower GI bleed [K92.2] 08/17/2020 Yes    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 [E83.59, L97.922] 07/10/2020 Yes    Calciphylaxis [E83.59] 06/05/2020 Yes    Encephalopathy chronic [G93.49] 06/05/2020 Yes    Chronic systolic heart failure [I50.22] 02/03/2020 Yes    PVD (peripheral vascular disease) [I73.9] 12/20/2019 Yes     Chronic    Pulmonary hypertension [I27.20] 07/25/2017 Yes     Chronic    ESRD (end stage renal disease) on HD M,W, F [N18.6] 05/11/2016 Yes     Chronic    HTN (hypertension) [I10] 08/15/2013 Yes     Chronic      Problems Resolved During this Admission:     Assessment and Plan    GI bleed:  -S/P 3 units PRBC; post Hgb 8.8g/dL  -EGD negative; await colonoscopy on Sunday  -getting Protonix IV  -clear liquids for now  -coumadin and ASA stopped  -most recent PT/INR 22/2.0  -GI consulting and following    A-Fib with RVR:  -getting Cardizem  -Cardiology following    HTN:  -above goal  -continue home meds  Cardiology consulting    ESRD:  -M-W-F schedule  -seen wile HD today; not tolerating well; cramping; 2.5 net UF removed  -renal chemistries good  -Strict I&O    Anemia:  -below goal  -getting DONNY's  -transfuse <7 or symtomatic    Renal BMD:  -Ca corrects for low albumin; consult nutrition  -check phos; receiving Sodium Thiosulfate for Calciphylaxis            Thank you for your consult. I will follow-up with patient. Please contact us if you have any additional questions.    Gabe Barksdale NP  Nephrology  [facility]

## 2020-10-02 NOTE — [employer] PATIENT INSTRUCTIONS
Discharge Summary/Instructions after an Endoscopic Procedure  Patient Name: Griselda Neely  Patient MRN: [id number]  Patient YOB: 1945 Friday, October 2, 2020  Talat Trevizo MD  RESTRICTIONS:  During your procedure today, you received medications for sedation.  These   medications may affect your judgment, balance and coordination.  Therefore,   for 24 hours, you have the following restrictions:   - DO NOT drive a car, operate machinery, make legal/financial decisions,   sign important papers or drink alcohol.    ACTIVITY:  Today: no heavy lifting, straining or running due to procedural   sedation/anesthesia.  The following day: return to full activity including work.  DIET:  Eat and drink normally unless instructed otherwise.     TREATMENT FOR COMMON SIDE EFFECTS:  - Mild abdominal pain, nausea, belching, bloating or excessive gas:  rest,   eat lightly and use a heating pad.  - Sore Throat: treat with throat lozenges and/or gargle with warm salt   water.  - Because air was used during the procedure, expelling large amounts of air   from your rectum or belching is normal.  - If a bowel prep was taken, you may not have a bowel movement for 1-3 days.    This is normal.  SYMPTOMS TO WATCH FOR AND REPORT TO YOUR PHYSICIAN:  1. Abdominal pain or bloating, other than gas cramps.  2. Chest pain.  3. Back pain.  4. Signs of infection such as: chills or fever occurring within 24 hours   after the procedure.  5. Rectal bleeding, which would show as bright red, maroon, or black stools.   (A tablespoon of blood from the rectum is not serious, especially if   hemorrhoids are present.)  6. Vomiting.  7. Weakness or dizziness.  GO DIRECTLY TO THE NEAREST EMERGENCY ROOM IF YOU HAVE ANY OF THE FOLLOWING:      Difficulty breathing              Chills and/or fever over 101 F   Persistent vomiting and/or vomiting blood   Severe abdominal pain   Severe chest pain   Black, tarry stools   Bleeding- more than one tablespoon   Any  other symptom or condition that you feel may need urgent attention  Your doctor recommends these additional instructions:  If any biopsies were taken, your doctors clinic will contact you in 1 to 2   weeks with any results.  -Colonoscopy Sunday if clinically stable from afib and to allow INR to drop   further.  - Return patient to ICU for ongoing care.  For questions, problems or results please call your physician - Talat Trevizo MD at Work:  [phone].  [facility], EMERGENCY ROOM PHONE NUMBER: [phone]  IF A COMPLICATION OR EMERGENCY SITUATION ARISES AND YOU ARE UNABLE TO REACH   YOUR PHYSICIAN - GO DIRECTLY TO THE EMERGENCY ROOM.  MD Talat Moreno MD  10/2/2020 8:11:51 AM  This report has been verified and signed electronically.  [employer]

## 2020-10-02 NOTE — TRANSFER OF CARE
"Anesthesia Transfer of Care Note    Patient: Griselda Neely    Procedure(s) Performed: Procedure(s) (LRB):  EGD (ESOPHAGOGASTRODUODENOSCOPY) (Left)    Patient location: Telemetry/Step Down Unit    Anesthesia Type: MAC    Transport from OR: Transported from OR on 6-10 L/min O2 by face mask with adequate spontaneous ventilation    Post pain: adequate analgesia    Post assessment: no apparent anesthetic complications    Post vital signs: stable    Level of consciousness: responds to stimulation and sedated    Nausea/Vomiting: no nausea/vomiting    Complications: none    Transfer of care protocol was followed      Last vitals:   Visit Vitals  BP (!) 108/48   Pulse (!) 124   Temp 36.7 °C (98.1 °F) (Oral)   Resp 19   Ht 5' 5" (1.651 m)   Wt 53.5 kg (118 lb)   LMP  (LMP Unknown)   SpO2 100%   Breastfeeding No   BMI 19.64 kg/m²     "

## 2020-10-02 NOTE — BRIEF OP NOTE
EGD  -no old or new blood throughout the visualized upper tract.  Stomach had patchy antral gastritis characterized as nodularity with erythema.  No fresh blood.  No ulcers.  Duodenum was unremarkable but very spastic.  Scope passed to the 3rd portion.  Esophagus overall unremarkable other than a small hiatal hernia    Plan-will allow patient's INR to down trend further stabilize her heart rate in regards to AFib and plan for possible colonoscopy on Sunday.  Full liquids today clears tomorrow

## 2020-10-02 NOTE — ANESTHESIA POSTPROCEDURE EVALUATION
Anesthesia Post Evaluation    Patient: Griselda Neely    Procedure(s) Performed: Procedure(s) (LRB):  EGD (ESOPHAGOGASTRODUODENOSCOPY) (Left)    Final Anesthesia Type: MAC    Patient location during evaluation: GI PACU  Patient participation: Yes- Able to Participate  Level of consciousness: awake and alert  Post-procedure vital signs: reviewed and stable  Pain management: adequate  Airway patency: patent    PONV status at discharge: No PONV  Anesthetic complications: no      Cardiovascular status: hemodynamically stable  Respiratory status: unassisted, spontaneous ventilation and face mask  Hydration status: euvolemic  Follow-up not needed.          Vitals Value Taken Time   /48 10/02/20 0702   Temp 36.7 °C (98.1 °F) 10/02/20 0700   Pulse 120 10/02/20 0730   Resp 22 10/02/20 0730   SpO2 100 % 10/02/20 0730   Vitals shown include unvalidated device data.      No case tracking events are documented in the log.      Pain/Skip Score: Pain Rating Prior to Med Admin: 10 (10/2/2020  3:32 AM)

## 2020-10-02 NOTE — PLAN OF CARE
10/02/20 1310   Patient Assessment/Suction   Level of Consciousness (AVPU) alert   Respiratory Effort Normal;Unlabored   Expansion/Accessory Muscles/Retractions no use of accessory muscles   All Lung Fields Breath Sounds crackles;diminished   STEPHANIE Breath Sounds diminished   LLL Breath Sounds crackles   RUL Breath Sounds diminished   RML Breath Sounds diminished   RLL Breath Sounds diminished   Rhythm/Pattern, Respiratory no shortness of breath reported   PRE-TX-O2   O2 Device (Oxygen Therapy) nasal cannula   Flow (L/min) 3   SpO2 96 %   Pulse (!) 130   Resp (!) 25   Aerosol Therapy   $ Aerosol Therapy Charges Aerosol Treatment   Daily Review of Necessity (SVN) completed   Respiratory Treatment Status (SVN) given   Treatment Route (SVN) mask;oxygen   Patient Position (SVN) HOB elevated   Post Treatment Assessment (SVN) increased aeration   Signs of Intolerance (SVN) none   Breath Sounds Post-Respiratory Treatment   Throughout All Fields Post-Treatment All Fields   Throughout All Fields Post-Treatment aeration increased   Post-treatment Heart Rate (beats/min) 128   Post-treatment Resp Rate (breaths/min) 24   continue therapy

## 2020-10-02 NOTE — NURSING
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 Very painful to the pt and is very controlling of the wound care of this area.   No pictures have been taken.   Recommend   Lt upper inner thigh  Clean with chlohrexidine/ns  pat dry  cover open wound with dermagel, aquacel ag, adaptic abd pad, kerlex and paper tape. Change dressing every 2 days.

## 2020-10-02 NOTE — NURSING
Unable to maintain or start a second IV    Pt refuses central line or PICC   cardizem infusing   protonix held

## 2020-10-02 NOTE — ANESTHESIA PREPROCEDURE EVALUATION
10/02/2020  Griselda Neely is a 75 y.o., female.  Patient Active Problem List   Diagnosis    HTN (hypertension)    PND (paroxysmal nocturnal dyspnea)    Tobacco dependence    ESRD (end stage renal disease) on HD M,W, F    Hyperparathyroidism    Other persistent atrial fibrillation    Anemia due to chronic kidney disease    Pulmonary hypertension    Non-rheumatic mitral regurgitation    DNR (do not resuscitate)    Acute on chronic respiratory failure    Coronary arteriosclerosis in native artery    Gastroesophageal reflux disease    S/P parathyroidectomy    Tension-type headache    Hyperparathyroidism due to renal insufficiency    Functional dyspepsia    HLD (hyperlipidemia)    PVD (peripheral vascular disease)    Encephalopathy    Chronic systolic heart failure    Peripheral vascular disease now with left lower extremity occlusion    H/O mitral valve replacement    Gout    Anemia    Chronic pain on chronic narcotics    Left leg pain    Volume overload    Calciphylaxis    Encephalopathy chronic    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    Malnutrition of moderate degree    Visit for wound check    Lower GI bleed    Atrial thrombus    Long term current use of anticoagulant    Poorly-controlled hypertension    Acute on chronic systolic heart failure    Acute blood loss anemia    Acute upper GI bleeding       Past Surgical History:   Procedure Laterality Date    ACHILLES TENDON SURGERY Right     ANGIOGRAPHY OF ARTERIOVENOUS SHUNT Right 1/7/2020    Procedure: Fistulogram with Possible Intervention;  Surgeon: Joseph Loyola MD;  Location: [facility] CATH/EP LAB;  Service: Cardiology;  Laterality: Right;    ANGIOGRAPHY OF LOWER EXTREMITY Left 3/18/2020    Procedure: Angiogram Extremity Unilateral;  Surgeon: Ali Khoobehi, MD;   Location: [facility] CATH/EP LAB;  Service: Cardiology;  Laterality: Left;    APPENDECTOMY      CARDIAC CATHETERIZATION  07/03/2017    CHOLECYSTECTOMY      ESOPHAGOGASTRODUODENOSCOPY Left 8/17/2020    Procedure: EGD (ESOPHAGOGASTRODUODENOSCOPY);  Surgeon: Talat Trevizo MD;  Location: [facility] ENDO;  Service: Endoscopy;  Laterality: Left;    heal surgery Right     HYSTERECTOMY      INSERTION OF STENT INTO PERIPHERAL VESSEL N/A 1/7/2020    Procedure: INSERTION, STENT, VESSEL, PERIPHERAL;  Surgeon: Joseph Loyola MD;  Location: [facility] CATH/EP LAB;  Service: Cardiology;  Laterality: N/A;    MITRAL VALVE REPLACEMENT      PARATHYROIDECTOMY      PARATHYROIDECTOMY  07/13/2017    PERCUTANEOUS TRANSLUMINAL ANGIOPLASTY OF ARTERIOVENOUS FISTULA N/A 1/7/2020    Procedure: PTA, AV FISTULA;  Surgeon: Joseph Loyola MD;  Location: [facility] CATH/EP LAB;  Service: Cardiology;  Laterality: N/A;    PERITONEAL CATHETER INSERTION      RENAL BIOPSY      vocal cord nodule      WOUND DEBRIDEMENT Left 7/13/2020    Procedure: DEBRIDEMENT, WOUND;  Surgeon: Carlos Elam III, MD;  Location: [facility] OR;  Service: General;  Laterality: Left;        Tobacco Use:  The patient  reports that she has been smoking cigarettes. She has a 22.50 pack-year smoking history. She has never used smokeless tobacco.     Results for orders placed or performed during the hospital encounter of 10/01/20   EKG 12-lead    Collection Time: 10/01/20  2:39 PM    Narrative    Test Reason : R06.00,    Vent. Rate : 114 BPM     Atrial Rate : 136 BPM     P-R Int : 000 ms          QRS Dur : 094 ms      QT Int : 314 ms       P-R-T Axes : 000 -37 100 degrees     QTc Int : 432 ms    Atrial fibrillation with rapid ventricular response  Left axis deviation  Abnormal QRS-T angle, consider primary T wave abnormality  Abnormal ECG  When compared with ECG of 21-SEP-2020 16:13,  No significant change was found    Referred By: [email or web address]   SELF           Confirmed By:               Lab Results   Component Value Date    WBC 5.34 10/02/2020    HGB 8.4 (L) 10/02/2020    HCT 26.2 (L) 10/02/2020    MCV 91 10/02/2020     (L) 10/02/2020     BMP  Lab Results   Component Value Date     10/02/2020    K 4.1 10/02/2020     10/02/2020    CO2 25 10/02/2020    BUN 43 (H) 10/02/2020    CREATININE 3.1 (H) 10/02/2020    CALCIUM 7.8 (L) 10/02/2020    ANIONGAP 11 10/02/2020    ESTGFRAFRICA 16.3 (A) 10/02/2020    EGFRNONAA 14.2 (A) 10/02/2020       Echocardiography Findings     Left Ventricle Severe decreased ejection fraction at 25%. Eccentric hypertrophy observed. Grade IV (severe) left ventricular diastolic dysfunction. Elevated left atrial pressure.   Right Ventricle The right ventricle is moderately dilated. The systolic function is moderately reduced.   Left Atrium There is severe left atrial enlargement. Layered thrombus suggested. The thrombus is fixed and located in the inferior cavity.   Right Atrium There is moderate right atrial enlargement.   Aortic Valve Aortic valve sclerosis is moderate. The LVOT diameter is 2.09 cm.   Mitral Valve Trace regurgitation. There is a bioprosthetic mitral valve. The prosthetic valve is normal.   Tricuspid Valve Moderate regurgitation. Mild to moderate pulmonary hypertension present.   Pulmonic Valve Mild to moderate regurgitation.   Pericardium Small posterior pericardial effusion. No cardiac tamponade present.       Pre-op Assessment    I have reviewed the Patient Summary Reports.     I have reviewed the Nursing Notes. I have reviewed the NPO Status.   I have reviewed the Medications.     Review of Systems  Anesthesia Hx:  No problems with previous Anesthesia  History of prior surgery of interest to airway management or planning: Previous anesthesia: General Denies Family Hx of Anesthesia complications.   Denies Personal Hx of Anesthesia complications.   Social:  Smoker, No Alcohol Use    Hematology/Oncology:         -- Anemia: Hematology  Comments: Emesis on admission, H/H: 4.2/14.3    Cardiovascular:   Hypertension, poorly controlled Valvular problems/Murmurs, MR CAD  Dysrhythmias atrial fibrillation CHF Orthopnea PND PVD hyperlipidemia ARGUELLO ECG has been reviewed. Hx. Lt. Atrial Thrombus   Diastolic Heart Failure   EF 25% Chronic CHF   Pulmonary HTN   Pulmonary:   Pneumonia COPD, severe Shortness of breath Home O2 3L/min   Renal/:   Chronic Renal Disease, Dialysis, CRI, ESRD Dialysis M,W,F;, did not have session, transferred to ED    Hepatic/GI:   GERD Emesis this admit    Musculoskeletal:   Arthritis  Left Leg Wound   Neurological:   Headaches Seizures    Endocrine:  Endocrine Normal Hypoglycemia Denies Thyroid Disease  Parathyroid Disease, Hyperparathyroidism, s/p parathyroidectomy Hyperparathyroidism secondary to renal insufficiency          Physical Exam  General:  Malnutrition    Airway/Jaw/Neck:  Airway Findings: Mouth Opening: Normal General Airway Assessment: Adult  Mallampati: II      Dental:  Dental Findings:   Chest/Lungs:  Chest/Lungs Findings: (bibasilar crackles) Decreased Breath Sounds Bilateral, Tachypnea     Heart/Vascular:  Heart Findings: Rate: Normal  Rhythm: Irregularly Irregular        Mental Status:  Mental Status Findings:  Cooperative, Alert and Oriented         Anesthesia Plan  Type of Anesthesia, risks & benefits discussed:  Anesthesia Type:  MAC  Patient's Preference: MAC  Intra-op Monitoring Plan: standard ASA monitors  Intra-op Monitoring Plan Comments:   Post Op Pain Control Plan: per primary service following discharge from PACU  Post Op Pain Control Plan Comments:   Induction:    Beta Blocker:  Patient is on a Beta-Blocker and has received one dose within the past 24 hours (No further documentation required).       Informed Consent: Patient understands risks and agrees with Anesthesia plan.  Questions answered. Anesthesia consent signed with patient.  ASA Score: 4     Day of Surgery Review of History & Physical:         Anesthesia Plan Notes:  MAC        Ready For Surgery From Anesthesia Perspective.

## 2020-10-02 NOTE — PLAN OF CARE
Assessment completed via phone with Pt's sister Aide.  She is , has 1 adult son that lives out of town, and has not addressed POA / AD.  Her PCP is Dr. Kalie Neely, and [facility].  She confirmed insurance, and has [employer] Medicare.  Plan for discharge at this time is home, with resumption of care with [facility] .  Case Management to continue to follow.    [facility]  Case Management  Ext. [phone]       10/02/20 8527   Discharge Assessment   Assessment Type Discharge Planning Assessment   Confirmed/corrected address and phone number on facesheet? Yes   Assessment information obtained from? Medical Record;Caregiver   Communicated expected length of stay with patient/caregiver no   Prior to hospitilization cognitive status: Unable to Assess   Prior to hospitalization functional status: Assistive Equipment   Current cognitive status: Unable to Assess   Current Functional Status: Assistive Equipment   Facility Arrived From: Home   Lives With sibling(s)   Able to Return to Prior Arrangements yes   Is patient able to care for self after discharge? No   Who are your caregiver(s) and their phone number(s)? Aide Watson, sister ([phone])   Elvi Hi, sister ([phone])   Patient's perception of discharge disposition home health   Readmission Within the Last 30 Days previous discharge plan unsuccessful   If yes, most recent facility name: [facility]   Patient currently being followed by outpatient case management? No   Patient currently receives any other outside agency services? Yes   Name and contact number of agency or person providing outside [facility]    Is it the patient/care giver preference to resume care with the current outside agency? Yes   Equipment Currently Used at Home rollator;cane, quad   Do you have any problems affording any of your prescribed medications? No   Is the patient taking medications as prescribed? yes   Does the patient  "have transportation home? Yes   Transportation Anticipated family or friend will provide   Dialysis Name and Scheduled days [facility] [place]   Does the patient receive services at the [facility]? No   Discharge Plan A Home Health   Discharge Plan B Home Health   DME Needed Upon Discharge  none   Patient/Family in Agreement with Plan yes   Readmission Questionnaire   At the time of your discharge, did someone talk to you about what your health problems were? Yes   At the time of discharge, did someone talk to you about what to watch out for regarding worsening of your health problem? Yes   At the time of discharge, did someone talk to you about what to do if you experienced worsening of your health problem? Yes   At the time of discharge, did someone talk to you about which medication to take when you left the hospital and which ones to stop taking? Yes   At the time of discharge, did someone talk to you about when and where to follow up with a doctor after you left the hospital? Yes   What do you believe caused you to be sick enough to be re-admitted? Pt's sister stated, "She started bleeding again."   How often do you need to have someone help you when you read instructions, pamphlets, or other written material from your doctor or pharmacy? Rarely   Do you have problems taking your medications as prescribed? No   Do you have any problems affording any of  your prescribed medications? No   Do you have problems obtaining/receiving your medications? No   Does the patient have transportation to healthcare appointments? Yes   Living Arrangements house   Does the patient have family/friends to help with healtcare needs after discharge? yes   Does your caregiver provide all the help you need? Yes   Are you currently feeling confused? Yes   Are you currently having problems thinking? Yes   Are you currently having memory problems? Yes   Have you felt down, depressed, or hopeless? 0   Have you " felt little interest or pleasure in doing things? 0   In the last 7 days, my sleep quality was: poor

## 2020-10-02 NOTE — PROGRESS NOTES
"[facility] Medicine Progress Note  Patient Name: Griselda Neely MRN: [id number]   Patient Class: IP- Inpatient  Length of Stay: 1   Admission Date: 10/1/2020  1:58 PM Attending Physician: Hola Rubio MD   Primary Care Provider: Kalie Neely MD Face-to-Face encounter date: 10/02/2020   Chief Complaint: Rectal Bleeding (blood in stool  had blood transfuion today)    Assessment & Plan:   Griselda Neely is a 75 y.o. female admitted for      GI bleeding  ABLA  COPD  Atrial fibrillation with RVR  Chronic systolic CHF  PVD  Calciphylaxis    S/p EGD  Planning for colonoscopy on Sunday  Dialysis as per Nephrology  IV Protonix  Discussed with cardiologist. Patient elected to be DNR/DNI  I have changed the status.     Core measures:  - Code status: full code   - Consultants:   Nephrology, Cardiology  - Diet: Regular/  - DVT PPx: bilateral SCDs  - lines: PIV   - GI PPx: PPI     Hospital Course     10/02/2020  Admitted with a-fib RVR, ABLA d/t GI bleed, negative EGD, colonoscopy Sunday      Discharge Planning:     PT C/S for debility/fraility/deconditioning and assistance in discharge needs.     Subjective:    Interval History   Patient is doing fairly well.  Reports hurting on the leg due to calciphylaxis.     Denies chest pain, shortness of breath, palpitations, abdominal pain, nausea/vomiting.   No concerns/issues overnight reported by the patient or the nursing staff.  Reviewed the labs and discussed the plan of care.   No family present at bedside.     Review of Systems   All other Review of Systems were found to be negative expect for that mentioned already in HPI.   Objective:   Physical Exam  BP (!) 152/77   Pulse (!) 111   Temp 97.6 °F (36.4 °C) (Oral)   Resp 19   Ht 5' 5" (1.651 m)   Wt 53.5 kg (118 lb)   LMP  (LMP Unknown)   SpO2 100%   Breastfeeding No   BMI 19.64 kg/m²   Vitals reviewed.    Constitutional: No distress.   HENT: Atraumatic.   Cardiovascular: Normal " rate, regular rhythm and normal heart sounds.   Pulmonary/Chest: Effort normal. Clear to auscultation bilaterally. No wheezes.   Abdominal: Soft. Bowel sounds are normal. Exhibits no distension and no mass. No tenderness  Neurological: Alert.   Skin: Skin is warm and dry.     Following labs were Reviewed   Recent Labs   Lab 10/02/20  0504  10/02/20  1434   WBC 5.34  --   --    HGB 8.4*   < > 8.8*   HCT 26.2*   < > 27.6*   *  --   --    CALCIUM 7.8*  --   --      --   --    K 4.1  --   --    CO2 25  --   --      --   --    BUN 43*  --   --    CREATININE 3.1*  --   --     < > = values in this interval not displayed.     No results found for: POCTGLUCOSE     All labs within the past 24 hours have been reviewed  Microbiology Results (last 7 days)     ** No results found for the last 168 hours. **        X-Ray Chest AP Portable   Final Result          Inpatient medications  Scheduled Meds:   sodium chloride 0.9%   Intravenous Once    sodium chloride 0.9%   Intravenous Once    calcium acetate(phosphat bind)  667 mg Oral Daily    epoetin alfonzo-epbx  100 Units/kg Subcutaneous Every Mon, Wed, Fri    human prothrombin complex (PCC)  35 Units/kg Intravenous Once    levalbuterol  0.63 mg Nebulization TID WAKE    metoprolol succinate  25 mg Oral Daily    mupirocin   Nasal BID    sodium thiosulfate  25 g Intravenous Every Mon, Wed, Fri     Continuous Infusions:   dilTIAZem 5 mg/hr (10/01/20 2300)    pantoprazole 40 mg in dextrose 5 % 100 mL infusion (ready to mix system) 8 mg/hr (10/02/20 1608)     PRN Meds:.sodium chloride, sodium chloride, sodium chloride 0.9%, sodium chloride 0.9%, fentaNYL, ondansetron, oxyCODONE-acetaminophen, promethazine (PHENERGAN) IVPB, sodium chloride 0.9%    Above encounter included review of the medical records, interviewing and examining the patient face-to-face, discussion with family and other health care providers, ordering and interpreting lab/test results and  formulating a plan of care.     Medical Decision Making:    [] Low Complexity  [x] Moderate Complexity  [] High Complexity    Hola Rubio  [facility] Hospitalist  10/02/2020

## 2020-10-02 NOTE — NURSING
HD tx complete, pt did not tolerate well  Patient remained in afib the entire tx with HR reaching 150-160  MD notified, RN obtained order for Cardizem gtt  Pt screamed almost the entire tx that she was in pain  Pt did not calm down until RN was able to obtain an order for tramadol finally  1 unit PRBC administered  Difficulty achieving hemostasis. Patient continued to wake up and move around, putting pressure on access arm  Net UF 1.6L

## 2020-10-02 NOTE — CONSULTS
Chief Complaint:  Melena    HPI:  75-year-old female with end-stage renal disease on dialysis, AFib on Coumadin, peripheral vascular disease who presented to the hospital with complaints of dark black stool.  Patient underwent EGD back in August with ablation of several AVMs.  Patient presents to the hospital with INR greater than for.  She received Kcentra and INRs to today.  Hemoglobin up to 8.  In AFib on Cardizem drip.    Patient states that she had been doing well up until day prior to admission when she had multiple black tar sticky stools    Review of Systems: Complete ROS performed and negative unless stated above in HPI          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    both legs    CHF (congestive heart failure)     Encounter for blood transfusion 03/2016    Gout     Hemodialysis access, AV graft     Hypertension     Mitral valve regurgitation     Osteoarthritis     Pancreatitis     Peripheral vascular disease     Pneumonia 09/09/2017    Renal failure        Past Surgical History:   Procedure Laterality Date    ACHILLES TENDON SURGERY Right     ANGIOGRAPHY OF ARTERIOVENOUS SHUNT Right 1/7/2020    Procedure: Fistulogram with Possible Intervention;  Surgeon: Joseph Loyola MD;  Location: [facility] CATH/EP LAB;  Service: Cardiology;  Laterality: Right;    ANGIOGRAPHY OF LOWER EXTREMITY Left 3/18/2020    Procedure: Angiogram Extremity Unilateral;  Surgeon: Ali Khoobehi, MD;  Location: [facility] CATH/EP LAB;  Service: Cardiology;  Laterality: Left;    APPENDECTOMY      CARDIAC CATHETERIZATION  07/03/2017    CHOLECYSTECTOMY      ESOPHAGOGASTRODUODENOSCOPY Left 8/17/2020    Procedure: EGD (ESOPHAGOGASTRODUODENOSCOPY);  Surgeon: Talat Trevizo MD;  Location: [facility] ENDO;  Service: Endoscopy;  Laterality: Left;    heal surgery Right     HYSTERECTOMY      INSERTION OF STENT INTO PERIPHERAL VESSEL N/A 1/7/2020    Procedure: INSERTION, STENT,  VESSEL, PERIPHERAL;  Surgeon: Joseph Loyola MD;  Location: [facility] CATH/EP LAB;  Service: Cardiology;  Laterality: N/A;    MITRAL VALVE REPLACEMENT      PARATHYROIDECTOMY      PARATHYROIDECTOMY  07/13/2017    PERCUTANEOUS TRANSLUMINAL ANGIOPLASTY OF ARTERIOVENOUS FISTULA N/A 1/7/2020    Procedure: PTA, AV FISTULA;  Surgeon: Joseph Loyola MD;  Location: [facility] CATH/EP LAB;  Service: Cardiology;  Laterality: N/A;    PERITONEAL CATHETER INSERTION      RENAL BIOPSY      vocal cord nodule      WOUND DEBRIDEMENT Left 7/13/2020    Procedure: DEBRIDEMENT, WOUND;  Surgeon: Carlos Elam III, MD;  Location: [facility] OR;  Service: General;  Laterality: Left;       Family History   Problem Relation Age of Onset    Heart disease Sister     Early death Sister         heart as baby    Heart disease Maternal Grandfather     Diabetes Mother     Hypertension Mother     Heart failure Mother     Heart disease Mother     Arthritis Mother     Diabetes Father     Early death Sister         infant       Social History     Socioeconomic History    Marital status:      Spouse name: Not on file    Number of children: Not on file    Years of education: Not on file    Highest education level: Not on file   Occupational History    Not on file   Social Needs    Financial resource strain: Not on file    Food insecurity     Worry: Not on file     Inability: Not on file    Transportation needs     Medical: Not on file     Non-medical: Not on file   Tobacco Use    Smoking status: Current Some Day Smoker     Packs/day: 0.50     Years: 45.00     Pack years: 22.50     Types: Cigarettes    Smokeless tobacco: Never Used   Substance and Sexual Activity    Alcohol use: No    Drug use: No    Sexual activity: Not on file   Lifestyle    Physical activity     Days per week: Not on file     Minutes per session: Not on file    Stress: Not on file   Relationships    Social connections     Talks on phone: Not on file      "Gets together: Not on file     Attends Sikhism service: Not on file     Active member of club or organization: Not on file     Attends meetings of clubs or organizations: Not on file     Relationship status: Not on file   Other Topics Concern    Not on file   Social History Narrative    Not on file       Review of patient's allergies indicates:  No Known Allergies    No current facility-administered medications on file prior to encounter.      Current Outpatient Medications on File Prior to Encounter   Medication Sig Dispense Refill    calcium acetate (PHOSLO) 667 mg capsule Take 667 mg by mouth once daily.       diltiaZEM (CARDIZEM CD) 180 MG 24 hr capsule Take 180 mg by mouth once daily.      folic acid/vit B complex and C (JENNIFER-KODAK ORAL) Take 1 tablet by mouth once daily.      isosorbide mononitrate (IMDUR) 30 MG 24 hr tablet Take 30 mg by mouth once daily.      losartan (COZAAR) 25 MG tablet Take 25 mg by mouth once daily.      magnesium oxide (MAG-OX) 400 mg (241.3 mg magnesium) tablet Take 1 tablet by mouth once daily (Patient taking differently: Take 400 mg by mouth once daily. ) 30 tablet 0    metoprolol succinate (TOPROL-XL) 25 MG 24 hr tablet Take 1 tablet (25 mg total) by mouth nightly. 30 tablet 0    ondansetron (ZOFRAN-ODT) 4 MG TbDL Take 4 mg by mouth every 6 (six) hours as needed.      oxyCODONE-acetaminophen (PERCOCET) 5-325 mg per tablet Take 1 tablet by mouth every 6 (six) hours as needed for Pain.      pantoprazole (PROTONIX) 40 MG tablet Take 1 tablet (40 mg total) by mouth once daily. 90 tablet 0    traMADoL (ULTRAM) 50 mg tablet Take 50 mg by mouth every 8 (eight) hours as needed for Pain.      zinc sulfate 220 mg Tab tablet Take 220 mg by mouth every other day.      wheelchair An 1 Device by Misc.(Non-Drug; Combo Route) route as needed (needed for transport).  0       Objective:  BP (!) 108/48   Pulse (!) 124   Temp 98.1 °F (36.7 °C) (Oral)   Resp 19   Ht 5' 5" (1.651 " m)   Wt 53.5 kg (118 lb)   LMP  (LMP Unknown)   SpO2 100%   Breastfeeding No   BMI 19.64 kg/m²   General: thin, ill appearing, nad  HE: ncat, perrl, eomi  ENT: op pink and moist without lesions or exudates  CV: +s1s2, no mrg, rrr  Resp: ctapb, no wrr  GI: bs active, abd soft, nt, nd  Skin: no lesions, no rash  Neuro: cn 2-12 in tact, no focal deficits, no asterixis  Psych: regular rate speech, normal affect    Labs:  Lab Results   Component Value Date    WBC 5.34 10/02/2020    HGB 8.4 (L) 10/02/2020    HCT 26.2 (L) 10/02/2020    MCV 91 10/02/2020     (L) 10/02/2020       Lab Results   Component Value Date    INR 2.0 10/02/2020    INR 4.8 10/01/2020    INR 3.1 09/22/2020     BMP  Lab Results   Component Value Date     10/02/2020    K 4.1 10/02/2020     10/02/2020    CO2 25 10/02/2020    BUN 43 (H) 10/02/2020    CREATININE 3.1 (H) 10/02/2020    CALCIUM 7.8 (L) 10/02/2020    ANIONGAP 11 10/02/2020    ESTGFRAFRICA 16.3 (A) 10/02/2020    EGFRNONAA 14.2 (A) 10/02/2020     Lab Results   Component Value Date    ALT 16 10/01/2020    AST 20 10/01/2020    ALKPHOS 94 10/01/2020    BILITOT 0.7 10/01/2020        Assessment:  75 F with symptomatic blood loss anemia likely from additional avms    Plan:  EGD now  Received protonix infusion

## 2020-10-03 LAB
ANION GAP SERPL CALC-SCNC: 12 MMOL/L (ref 8–16)
BASOPHILS # BLD AUTO: 0.01 K/UL (ref 0–0.2)
BASOPHILS # BLD AUTO: 0.01 K/UL (ref 0–0.2)
BASOPHILS NFR BLD: 0.2 % (ref 0–1.9)
BASOPHILS NFR BLD: 0.2 % (ref 0–1.9)
BUN SERPL-MCNC: 25 MG/DL (ref 8–23)
CALCIUM SERPL-MCNC: 8.3 MG/DL (ref 8.7–10.5)
CHLORIDE SERPL-SCNC: 98 MMOL/L (ref 95–110)
CO2 SERPL-SCNC: 23 MMOL/L (ref 23–29)
CREAT SERPL-MCNC: 3.3 MG/DL (ref 0.5–1.4)
DIFFERENTIAL METHOD: ABNORMAL
DIFFERENTIAL METHOD: ABNORMAL
EOSINOPHIL # BLD AUTO: 0.1 K/UL (ref 0–0.5)
EOSINOPHIL # BLD AUTO: 0.1 K/UL (ref 0–0.5)
EOSINOPHIL NFR BLD: 1.1 % (ref 0–8)
EOSINOPHIL NFR BLD: 1.1 % (ref 0–8)
ERYTHROCYTE [DISTWIDTH] IN BLOOD BY AUTOMATED COUNT: 17.9 % (ref 11.5–14.5)
ERYTHROCYTE [DISTWIDTH] IN BLOOD BY AUTOMATED COUNT: 17.9 % (ref 11.5–14.5)
EST. GFR  (AFRICAN AMERICAN): 15.1 ML/MIN/1.73 M^2
EST. GFR  (NON AFRICAN AMERICAN): 13.1 ML/MIN/1.73 M^2
GLUCOSE SERPL-MCNC: 87 MG/DL (ref 70–110)
HCT VFR BLD AUTO: 28.2 % (ref 37–48.5)
HCT VFR BLD AUTO: 28.2 % (ref 37–48.5)
HGB BLD-MCNC: 8.8 G/DL (ref 12–16)
HGB BLD-MCNC: 8.8 G/DL (ref 12–16)
IMM GRANULOCYTES # BLD AUTO: 0.02 K/UL (ref 0–0.04)
IMM GRANULOCYTES # BLD AUTO: 0.02 K/UL (ref 0–0.04)
IMM GRANULOCYTES NFR BLD AUTO: 0.4 % (ref 0–0.5)
IMM GRANULOCYTES NFR BLD AUTO: 0.4 % (ref 0–0.5)
LYMPHOCYTES # BLD AUTO: 0.9 K/UL (ref 1–4.8)
LYMPHOCYTES # BLD AUTO: 0.9 K/UL (ref 1–4.8)
LYMPHOCYTES NFR BLD: 16.4 % (ref 18–48)
LYMPHOCYTES NFR BLD: 16.4 % (ref 18–48)
MAGNESIUM SERPL-MCNC: 1.9 MG/DL (ref 1.6–2.6)
MCH RBC QN AUTO: 29.1 PG (ref 27–31)
MCH RBC QN AUTO: 29.1 PG (ref 27–31)
MCHC RBC AUTO-ENTMCNC: 31.2 G/DL (ref 32–36)
MCHC RBC AUTO-ENTMCNC: 31.2 G/DL (ref 32–36)
MCV RBC AUTO: 93 FL (ref 82–98)
MCV RBC AUTO: 93 FL (ref 82–98)
MONOCYTES # BLD AUTO: 0.6 K/UL (ref 0.3–1)
MONOCYTES # BLD AUTO: 0.6 K/UL (ref 0.3–1)
MONOCYTES NFR BLD: 10.8 % (ref 4–15)
MONOCYTES NFR BLD: 10.8 % (ref 4–15)
NEUTROPHILS # BLD AUTO: 3.8 K/UL (ref 1.8–7.7)
NEUTROPHILS # BLD AUTO: 3.8 K/UL (ref 1.8–7.7)
NEUTROPHILS NFR BLD: 71.1 % (ref 38–73)
NEUTROPHILS NFR BLD: 71.1 % (ref 38–73)
NRBC BLD-RTO: 0 /100 WBC
NRBC BLD-RTO: 0 /100 WBC
PLATELET # BLD AUTO: 155 K/UL (ref 150–350)
PLATELET # BLD AUTO: 155 K/UL (ref 150–350)
PMV BLD AUTO: 10.8 FL (ref 9.2–12.9)
PMV BLD AUTO: 10.8 FL (ref 9.2–12.9)
POTASSIUM SERPL-SCNC: 4.5 MMOL/L (ref 3.5–5.1)
RBC # BLD AUTO: 3.02 M/UL (ref 4–5.4)
RBC # BLD AUTO: 3.02 M/UL (ref 4–5.4)
SODIUM SERPL-SCNC: 133 MMOL/L (ref 136–145)
WBC # BLD AUTO: 5.3 K/UL (ref 3.9–12.7)
WBC # BLD AUTO: 5.3 K/UL (ref 3.9–12.7)

## 2020-10-03 PROCEDURE — 80048 BASIC METABOLIC PNL TOTAL CA: CPT

## 2020-10-03 PROCEDURE — 27000221 HC OXYGEN, UP TO 24 HOURS

## 2020-10-03 PROCEDURE — 25000003 PHARM REV CODE 250: Performed by: INTERNAL MEDICINE

## 2020-10-03 PROCEDURE — 94640 AIRWAY INHALATION TREATMENT: CPT

## 2020-10-03 PROCEDURE — 63600175 PHARM REV CODE 636 W HCPCS: Performed by: INTERNAL MEDICINE

## 2020-10-03 PROCEDURE — 94761 N-INVAS EAR/PLS OXIMETRY MLT: CPT

## 2020-10-03 PROCEDURE — 25000242 PHARM REV CODE 250 ALT 637 W/ HCPCS: Performed by: INTERNAL MEDICINE

## 2020-10-03 PROCEDURE — 85025 COMPLETE CBC W/AUTO DIFF WBC: CPT

## 2020-10-03 PROCEDURE — 97530 THERAPEUTIC ACTIVITIES: CPT

## 2020-10-03 PROCEDURE — 20000000 HC ICU ROOM

## 2020-10-03 PROCEDURE — 83735 ASSAY OF MAGNESIUM: CPT

## 2020-10-03 PROCEDURE — C9113 INJ PANTOPRAZOLE SODIUM, VIA: HCPCS | Performed by: INTERNAL MEDICINE

## 2020-10-03 PROCEDURE — 99900035 HC TECH TIME PER 15 MIN (STAT)

## 2020-10-03 PROCEDURE — 36415 COLL VENOUS BLD VENIPUNCTURE: CPT

## 2020-10-03 PROCEDURE — 97165 OT EVAL LOW COMPLEX 30 MIN: CPT

## 2020-10-03 RX ORDER — LEVALBUTEROL INHALATION SOLUTION 0.63 MG/3ML
0.63 SOLUTION RESPIRATORY (INHALATION) EVERY 6 HOURS PRN
Status: DISCONTINUED | OUTPATIENT
Start: 2020-10-03 | End: 2020-10-04 | Stop reason: HOSPADM

## 2020-10-03 RX ORDER — POLYETHYLENE GLYCOL 3350 17 G/17G
238 POWDER, FOR SOLUTION ORAL ONCE
Status: COMPLETED | OUTPATIENT
Start: 2020-10-03 | End: 2020-10-03

## 2020-10-03 RX ADMIN — MUPIROCIN: 20 OINTMENT TOPICAL at 09:10

## 2020-10-03 RX ADMIN — CALCIUM ACETATE 667 MG: 667 CAPSULE ORAL at 10:10

## 2020-10-03 RX ADMIN — POLYETHYLENE GLYCOL 3350 221 G: 17 POWDER, FOR SOLUTION ORAL at 09:10

## 2020-10-03 RX ADMIN — PANTOPRAZOLE SODIUM 8 MG/HR: 40 INJECTION, POWDER, FOR SOLUTION INTRAVENOUS at 10:10

## 2020-10-03 RX ADMIN — OXYCODONE AND ACETAMINOPHEN 1 TABLET: 5; 325 TABLET ORAL at 01:10

## 2020-10-03 RX ADMIN — MUPIROCIN: 20 OINTMENT TOPICAL at 10:10

## 2020-10-03 RX ADMIN — METOPROLOL SUCCINATE 25 MG: 25 TABLET, FILM COATED, EXTENDED RELEASE ORAL at 10:10

## 2020-10-03 RX ADMIN — LEVALBUTEROL HYDROCHLORIDE 0.63 MG: 0.63 SOLUTION RESPIRATORY (INHALATION) at 08:10

## 2020-10-03 RX ADMIN — PANTOPRAZOLE SODIUM 8 MG/HR: 40 INJECTION, POWDER, FOR SOLUTION INTRAVENOUS at 05:10

## 2020-10-03 RX ADMIN — PANTOPRAZOLE SODIUM 8 MG/HR: 40 INJECTION, POWDER, FOR SOLUTION INTRAVENOUS at 06:10

## 2020-10-03 RX ADMIN — PANTOPRAZOLE SODIUM 8 MG/HR: 40 INJECTION, POWDER, FOR SOLUTION INTRAVENOUS at 01:10

## 2020-10-03 NOTE — PROGRESS NOTES
[facility]  Nephrology  Progress Note    Patient Name: Griselda Neely  MRN: [id number]  Admission Date: 10/1/2020  Hospital Length of Stay: 2 days  Attending Provider: Hola Rubio MD   Primary Care Physician: Kalie Neely MD  Principal Problem:Acute blood loss anemia      Subjective:     Interval History: No overnight complications. Lying in bed with no complaints other then she is hungry. Possible colonoscopy tomorrow per GI. Possible D/C.    Review of patient's allergies indicates:  No Known Allergies  Current Facility-Administered Medications   Medication Frequency    0.9%  NaCl infusion (for blood administration) Q24H PRN    0.9%  NaCl infusion (for blood administration) Q24H PRN    0.9%  NaCl infusion PRN    0.9%  NaCl infusion Once    0.9%  NaCl infusion PRN    0.9%  NaCl infusion Once    calcium acetate(phosphat bind) capsule 667 mg Daily    diltiaZEM 125 mg in D5W 125 mL infusion Continuous    epoetin alfonzo-epbx injection 5,400 Units Every Mon, Wed, Fri    fentaNYL injection 25 mcg Q2H PRN    human prothrombin complex (PCC) (KCENTRA) 1,872.5 Units in sterile water for injection IVPB Once    levalbuterol nebulizer solution 0.63 mg TID WAKE    metoprolol succinate (TOPROL-XL) 24 hr tablet 25 mg Daily    mupirocin 2 % ointment BID    ondansetron disintegrating tablet 8 mg Q8H PRN    oxyCODONE-acetaminophen 5-325 mg per tablet 1 tablet Q4H PRN    pantoprazole 40 mg in dextrose 5 % 100 mL infusion (ready to mix system) Continuous    promethazine (PHENERGAN) 6.25 mg in dextrose 5 % 50 mL IVPB Q6H PRN    sodium chloride 0.9% flush 10 mL PRN    sodium thiosulfate 12.5 gram/50 mL (250 mg/mL) injection 25 g Every Mon, Wed, Fri       Objective:     Vital Signs (Most Recent):  Temp: 97.8 °F (36.6 °C) (10/03/20 0500)  Pulse: 71 (10/03/20 0700)  Resp: (!) 23 (10/03/20 0700)  BP: (!) 155/91 (10/03/20 0600)  SpO2: 99 % (10/03/20 0700)  O2 Device (Oxygen Therapy): nasal  cannula (10/03/20 0700) Vital Signs (24h Range):  Temp:  [97.6 °F (36.4 °C)-98 °F (36.7 °C)] 97.8 °F (36.6 °C)  Pulse:  [] 71  Resp:  [14-32] 23  SpO2:  [50 %-100 %] 99 %  BP: ()/() 155/91     Weight: 53.5 kg (118 lb) (10/01/20 1353)  Body mass index is 19.64 kg/m².  Body surface area is 1.57 meters squared.    I/O last 3 completed shifts:  In: 3500.8 [P.O.:580; I.V.:720.8; Blood:900; Other:1300]  Out: 7940 [Other:7940]    Physical Exam  Constitutional:       Appearance: Normal appearance.   HENT:      Head: Normocephalic.      Mouth/Throat:      Mouth: Mucous membranes are dry.      Pharynx: Oropharynx is clear.   Eyes:      Extraocular Movements: Extraocular movements intact.      Conjunctiva/sclera: Conjunctivae normal.      Pupils: Pupils are equal, round, and reactive to light.   Cardiovascular:      Rate and Rhythm: Normal rate. Rhythm irregular.      Pulses: Normal pulses.      Heart sounds: Normal heart sounds.   Pulmonary:      Effort: Pulmonary effort is normal.      Breath sounds: Normal breath sounds.   Abdominal:      General: Bowel sounds are normal.      Palpations: Abdomen is soft.   Musculoskeletal: Normal range of motion.   Skin:     General: Skin is warm and dry.      Capillary Refill: Capillary refill takes less than 2 seconds.   Neurological:      Mental Status: She is alert and oriented to person, place, and time. Mental status is at baseline.   Psychiatric:         Mood and Affect: Mood normal.         Behavior: Behavior normal.         Thought Content: Thought content normal.         Judgment: Judgment normal.         Significant Labs:sure  BMP:   Recent Labs   Lab 10/03/20  0550   GLU 87   CL 98   CO2 23   BUN 25*   CREATININE 3.3*   CALCIUM 8.3*   MG 1.9     CBC:   Recent Labs   Lab 10/03/20  0550   WBC 5.30  5.30   RBC 3.02*  3.02*   HGB 8.8*  8.8*   HCT 28.2*  28.2*     155   MCV 93  93   MCH 29.1  29.1   MCHC 31.2*  31.2*     CMP:   Recent Labs   Lab  10/01/20  1503  10/03/20  0550      < > 87   CALCIUM 7.8*   < > 8.3*   ALBUMIN 2.6*  --   --    PROT 6.0  --   --       < > 133*   K 5.6*   < > 4.5   CO2 24   < > 23   CL 95   < > 98   BUN 83*   < > 25*   CREATININE 4.7*   < > 3.3*   ALKPHOS 94  --   --    ALT 16  --   --    AST 20  --   --    BILITOT 0.7  --   --     < > = values in this interval not displayed.     All labs within the past 24 hours have been reviewed.    Significant Imaging:  Labs: Reviewed    Assessment/Plan:     GI bleed:  -S/P 3 units PRBC; post Hgb 8.8g/dL  -EGD negative; await colonoscopy on Sunday  -getting Protonix IV  -clear liquids for now  -coumadin and ASA stopped  -most recent PT/INR 22/2.0  -Possible colonoscopy tomorrow  -GI consulting and following     A-Fib with RVR:  -getting Cardizem  -Cardiology following     HTN:  -above goal  -continue home meds  Cardiology consulting     ESRD:  -M-W-F schedule  -renal chemistries good  -Lytes stable  -Strict I&O  -Per staff possible D/C follow up at HD outpatient center     Anemia:  -below goal  -getting DONNY's  -transfuse <7 or symtomatic    HypoNa  Mild  Will address with HD     Renal BMD:  -Ca corrects for low albumin; consult nutrition  -check phos; receiving Sodium Thiosulfate for Calciphylaxis      Harinder Hatfield NP  Nephrology  [facility]

## 2020-10-03 NOTE — PROGRESS NOTES
"[facility] Medicine Progress Note  Patient Name: Griselda Neely MRN: [id number]   Patient Class: IP- Inpatient  Length of Stay: 2   Admission Date: 10/1/2020  1:58 PM Attending Physician: Hola Rubio MD   Primary Care Provider: Kalie Neely MD Face-to-Face encounter date: 10/03/2020   Chief Complaint: Rectal Bleeding (blood in stool  had blood transfuion today)    Assessment & Plan:   Griselda Neely is a 75 y.o. female admitted for      GI bleeding  ABLA  COPD  Atrial fibrillation with RVR  Chronic systolic CHF  PVD  Calciphylaxis    S/p EGD  Planning for colonoscopy on Sunday  Dialysis as per Nephrology  IV Protonix   DNR/DNI    Core measures:  - Code status:DNR/DNI  - Consultants:   Nephrology, Cardiology  - Diet: Regular/  - DVT PPx: bilateral SCDs  - lines: PIV   - GI PPx: PPI     Hospital Course     10/02/2020  Admitted with a-fib RVR, ABLA d/t GI bleed, negative EGD, colonoscopy [place]  10/03: stable. No signs of bleeding. D/c tomorrow after colonscopy    Discharge Planning:     PT C/S for debility/fraility/deconditioning and assistance in discharge needs.     Subjective:    Interval History   Patient is doing fairly well.  Reports hurting on the leg due to calciphylaxis.     Denies chest pain, shortness of breath, palpitations, abdominal pain, nausea/vomiting.   No concerns/issues overnight reported by the patient or the nursing staff.  Reviewed the labs and discussed the plan of care.   No family present at bedside.     Review of Systems   All other Review of Systems were found to be negative expect for that mentioned already in HPI.   Objective:   Physical Exam  BP (!) 157/77   Pulse 82   Temp 97.8 °F (36.6 °C)   Resp (!) 27   Ht 5' 5" (1.651 m)   Wt 53.5 kg (117 lb 15.1 oz)   LMP  (LMP Unknown)   SpO2 99%   Breastfeeding No   BMI 19.63 kg/m²   Vitals reviewed.    Constitutional: No distress.   HENT: Atraumatic.   Cardiovascular: Normal rate, regular rhythm " and normal heart sounds.   Pulmonary/Chest: Effort normal. Clear to auscultation bilaterally. No wheezes.   Abdominal: Soft. Bowel sounds are normal. Exhibits no distension and no mass. No tenderness  Neurological: Alert.   Skin: Skin is warm and dry.     Following labs were Reviewed   Recent Labs   Lab 10/03/20  0550   WBC 5.30  5.30   HGB 8.8*  8.8*   HCT 28.2*  28.2*     155   CALCIUM 8.3*   *   K 4.5   CO2 23   CL 98   BUN 25*   CREATININE 3.3*     No results found for: POCTGLUCOSE     All labs within the past 24 hours have been reviewed  Microbiology Results (last 7 days)     ** No results found for the last 168 hours. **        X-Ray Chest AP Portable   Final Result          Inpatient medications  Scheduled Meds:   sodium chloride 0.9%   Intravenous Once    sodium chloride 0.9%   Intravenous Once    calcium acetate(phosphat bind)  667 mg Oral Daily    epoetin alfonzo-epbx  100 Units/kg Subcutaneous Every Mon, Wed, Fri    human prothrombin complex (PCC)  35 Units/kg Intravenous Once    levalbuterol  0.63 mg Nebulization TID WAKE    metoprolol succinate  25 mg Oral Daily    mupirocin   Nasal BID    sodium thiosulfate  25 g Intravenous Every Mon, Wed, Fri     Continuous Infusions:   dilTIAZem 5 mg/hr (10/02/20 1719)    pantoprazole 40 mg in dextrose 5 % 100 mL infusion (ready to mix system) 8 mg/hr (10/03/20 1311)     PRN Meds:.sodium chloride, sodium chloride, sodium chloride 0.9%, sodium chloride 0.9%, fentaNYL, ondansetron, oxyCODONE-acetaminophen, promethazine (PHENERGAN) IVPB, sodium chloride 0.9%    Above encounter included review of the medical records, interviewing and examining the patient face-to-face, discussion with family and other health care providers, ordering and interpreting lab/test results and formulating a plan of care.     Medical Decision Making:    [] Low Complexity  [x] Moderate Complexity  [] High Complexity    Hola Rubio  [facility] Hospitalist  10/03/2020

## 2020-10-03 NOTE — PROGRESS NOTES
Progress Note  Gastroenterology/Hepatology    SUBJECTIVE:     Follow-up For:  melena    HPI/Interval history: no complaints today except for feeling hungry.  1 BM, no melena    PMH/FH/SH/Review of Systems: See H and P/Consult dated 10/1/2020    OBJECTIVE:     Vital Signs Range (Last 24H):  Temp:  [97.8 °F (36.6 °C)-98 °F (36.7 °C)]   Pulse:  []   Resp:  [14-27]   BP: (130-159)/()   SpO2:  [91 %-100 %]     I & O (Last 24H):    Intake/Output Summary (Last 24 hours) at 10/3/2020 1705  Last data filed at 10/3/2020 0600  Gross per 24 hour   Intake 1000.84 ml   Output 2500 ml   Net -1499.16 ml       Physical Exam:  General: well developed, well nourished  Lungs:  clear to auscultation bilaterally and normal respiratory effort  Cardiovascular: Heart: regular rate and rhythm, S1, S2 normal, no murmur, click, rub or gallop. Chest Wall: no tenderness. Extremities: no cyanosis or edema, or clubbing. Pulses: 2+ and symmetric.  Abdomen/Rectal: Abdomen: soft, non-tender non-distented; bowel sounds normal; no masses,  no organomegaly. Rectal: not examined    Lab/X-ray Results:  CBC:   Recent Labs   Lab 10/03/20  0550   WBC 5.30  5.30   RBC 3.02*  3.02*   HGB 8.8*  8.8*   HCT 28.2*  28.2*     155   MCV 93  93   MCH 29.1  29.1   MCHC 31.2*  31.2*     BMP:   Recent Labs   Lab 10/03/20  0550   GLU 87   *   K 4.5   CL 98   CO2 23   BUN 25*   CREATININE 3.3*   CALCIUM 8.3*   MG 1.9     CMP:   Recent Labs   Lab 10/01/20  1503  10/03/20  0550      < > 87   CALCIUM 7.8*   < > 8.3*   ALBUMIN 2.6*  --   --    PROT 6.0  --   --       < > 133*   K 5.6*   < > 4.5   CO2 24   < > 23   CL 95   < > 98   BUN 83*   < > 25*   CREATININE 4.7*   < > 3.3*   ALKPHOS 94  --   --    ALT 16  --   --    AST 20  --   --    BILITOT 0.7  --   --     < > = values in this interval not displayed.     LFTs:   Recent Labs   Lab 10/01/20  1503   ALT 16   AST 20   ALKPHOS 94   BILITOT 0.7   PROT 6.0   ALBUMIN 2.6*      Coagulation:   Recent Labs   Lab 10/01/20  1503 10/02/20  0504   INR 4.8 2.0   APTT 46.8*  --        ASSESSMENT/PLAN:   This is a very pleasant 74yo F with ESRD on HD, atrial fib on Coumadin, PVD, who was admitted on 10/1 with black stools and decrease in Hgb.  Had previous hx of AVms in the upper GI tract treated with EGD in 8/2020.   EGD yesterday with Dr. Santhosh kuo.        1. Melena- Hgb stable at this time, no further signs of bleeding.  Will plan for colonoscopy tomorrow for further evaluation of recent bleeding and to rule out bleeding AVMs from the right colon.  Continue to hold blood thinners.  Colon prep tonight and NPO at midnight for Colon with MAC tomorrow.  Further recs pending results        Thank you very much for the consult.  I will continue to follow.  Please do not hesitate to contact me with any questions or concerns.    Jordan Kilpatrick MD

## 2020-10-03 NOTE — PT/OT/SLP PROGRESS
Physical Therapy      Patient Name:  Griselda Neely   MRN:  [id number]    PT eval attempted-  Nurse Tammy stated to let pt rest. Will re attempt  10-.    Nancy Quijano, PT

## 2020-10-03 NOTE — PLAN OF CARE
This note also relates to the following rows which could not be included:  SpO2 - Cannot attach notes to unvalidated device data  Pulse - Cannot attach notes to unvalidated device data  Resp - Cannot attach notes to unvalidated device data  ETCO2 (mmHg) - Cannot attach notes to unvalidated device data       10/03/20 0700   PRE-TX-O2   O2 Device (Oxygen Therapy) nasal cannula   Flow (L/min) 3   Pulse Oximetry Type Continuous   $ Pulse Oximetry - Multiple Charge Pulse Oximetry - Multiple   Aerosol Therapy   $ Aerosol Therapy Charges Aerosol Treatment;Refused

## 2020-10-03 NOTE — PROGRESS NOTES
"[facility]  Adult Nutrition   Progress Note (Initial Assessment)     SUMMARY     Recommendations/Interventions:    Recommendation/Intervention: 1.  Pt is on HD and BMI at low end of normal range. Encouraged intake. Advance diet as medically able to renal diet.  2.  to assist with menu selctions when diet advances.  Goals: 1. Pt to meet at least 75% of estimated nutritional needs via po intake  Nutrition Goal Status: new  Communication of RD Recs: reviewed with RN    Dietitian Rounds Brief:    Spoke with pt- she did not drink much at lunch.  She does not want Ensure clear added to her diet order.  She has a procedure planned for Sunday and is presently receiving a clear liquid diet.      Reason for Assessment  Reason For Assessment: other (see comments)(ICU pt)  Diagnosis: other (see comments)(Acute Blood Loss anemia)  Relevant Medical History: ESRD on HD, CHF, HTN, PVD  Interdisciplinary Rounds: did not attend    Nutrition Risk Screen  Nutrition Risk Screen: no indicators present     Nutrition/Diet History  Food Allergies: NKFA  Factors Affecting Nutritional Intake: clear liquid diet    Anthropometrics  Temp: 97.8 °F (36.6 °C)  Height Method: Stated  Height: 5' 5" (165.1 cm)  Height (inches): 65 in  Weight Method: Stated  Weight: 53.5 kg (117 lb 15.1 oz)  Weight (lb): 117.95 lb  Ideal Body Weight (IBW), Female: 125 lb  % Ideal Body Weight, Female (lb): 94.36 %  BMI (Calculated): 19.6  BMI Grade: 18.5-24.9 - normal       Weight History:  Wt Readings from Last 10 Encounters:   10/03/20 53.5 kg (117 lb 15.1 oz)   10/01/20 53.5 kg (118 lb)   09/22/20 55.6 kg (122 lb 9.2 oz)   09/08/20 54.4 kg (120 lb)   09/05/20 59.9 kg (132 lb 0.9 oz)   09/01/20 54.4 kg (120 lb)   08/17/20 58 kg (127 lb 13.9 oz)   08/04/20 54.9 kg (121 lb)   07/22/20 54.9 kg (121 lb 0.5 oz)   07/10/20 40.9 kg (90 lb 2.7 oz)   }  Lab/Procedures/Meds: Pertinent Labs Reviewed  Clinical Chemistry:  Recent Labs   Lab 10/01/20  1504 " 10/03/20  0550    133*   K 5.6* 4.5   CL 95 98   CO2 24 23    87   BUN 83* 25*   CREATININE 4.7* 3.3*   CALCIUM 7.8* 8.3*   PROT 6.0  --    ALBUMIN 2.6*  --    BILITOT 0.7  --    ALKPHOS 94  --    AST 20  --    ALT 16  --    ANIONGAP 18* 12   ESTGFRAFRICA 9.9* 15.1*   EGFRNONAA 8.6* 13.1*   MG  --  1.9    < > = values in this interval not displayed.     CBC:   Recent Labs   Lab 10/03/20  0550   WBC 5.30  5.30   RBC 3.02*  3.02*   HGB 8.8*  8.8*   HCT 28.2*  28.2*     155   MCV 93  93   MCH 29.1  29.1   MCHC 31.2*  31.2*     Cardiac Profile:  Recent Labs   Lab 10/01/20  1503   BNP >4,500*   TROPONINI <0.030     Medications: Pertinent Medications reviewed  Scheduled Meds:   sodium chloride 0.9%   Intravenous Once    sodium chloride 0.9%   Intravenous Once    calcium acetate(phosphat bind)  667 mg Oral Daily    epoetin alfonzo-epbx  100 Units/kg Subcutaneous Every Mon, Wed, Fri    human prothrombin complex (PCC)  35 Units/kg Intravenous Once    levalbuterol  0.63 mg Nebulization TID WAKE    metoprolol succinate  25 mg Oral Daily    mupirocin   Nasal BID    sodium thiosulfate  25 g Intravenous Every Mon, Wed, Fri     Continuous Infusions:   dilTIAZem 5 mg/hr (10/02/20 5290)    pantoprazole 40 mg in dextrose 5 % 100 mL infusion (ready to mix system) 8 mg/hr (10/03/20 5420)     PRN Meds:.sodium chloride, sodium chloride, sodium chloride 0.9%, sodium chloride 0.9%, fentaNYL, ondansetron, oxyCODONE-acetaminophen, promethazine (PHENERGAN) IVPB, sodium chloride 0.9%    Estimated/Assessed Needs    Weight Used For Calorie Calculations: 53.5 kg (117 lb 15.1 oz)  Energy Calorie Requirements (kcal): 2914-2019 calories (30-35 leila/kg BW 2' to HD)  Energy Need Method: Kcal/kg  Protein Requirements: 64-75 g (1.2-1.4 g/kg BW)  Weight Used For Protein Calculations: 53.5 kg (117 lb 15.1 oz)  Fluid Requirements (mL): 1000 ml or per MD       Nutrition Prescription Ordered    Current Diet Order: Clear  liquids  Nutrition Order Comments: - pt to possibly have a colonoscopy on Sunday    Evaluation of Received Nutrient/Fluid Intake    Energy Calories Required: not meeting needs  Protein Required: not meeting needs  Fluid Required: meeting needs  Tolerance: tolerating  % Intake of Estimated Energy Needs: 0 - 25 %  % Meal Intake: 0 - 25 %    Intake/Output Summary (Last 24 hours) at 10/3/2020 0908  Last data filed at 10/3/2020 0600  Gross per 24 hour   Intake 1500.84 ml   Output 5500 ml   Net -3999.16 ml      Nutrition Risk    Level of Risk/Frequency of Follow-up: high   Monitor and Evaluation    Food and Nutrient Intake: energy intake, food and beverage intake  Food and Nutrient Adminstration: diet order  Knowledge/Beliefs/Attitudes: beliefs and attitudes, food and nutrition knowledge/skill  Physical Activity and Function: nutrition-related ADLs and IADLs  Anthropometric Measurements: weight, weight change  Biochemical Data, Medical Tests and Procedures: lipid profile, electrolyte and renal panel, gastrointestinal profile, glucose/endocrine profile, inflammatory profile  Nutrition-Focused Physical Findings: overall appearance     Nutrition Follow-Up     Yes   Alfreda Sellers 10/3/20

## 2020-10-03 NOTE — PLAN OF CARE
10/02/20 2023   Patient Assessment/Suction   Level of Consciousness (AVPU) alert   Respiratory Effort Normal;Unlabored   Expansion/Accessory Muscles/Retractions no use of accessory muscles   All Lung Fields Breath Sounds equal bilaterally;coarse;crackles   Rhythm/Pattern, Respiratory unlabored   Cough Frequency frequent   Cough Type fair;loose;nonproductive   PRE-TX-O2   O2 Device (Oxygen Therapy) nasal cannula   $ Is the patient on Low Flow Oxygen? Yes   Flow (L/min) 3   SpO2 100 %   Pulse Oximetry Type Continuous   $ Pulse Oximetry - Multiple Charge Pulse Oximetry - Multiple   Pulse 85   Resp 18   Aerosol Therapy   $ Aerosol Therapy Charges Aerosol Treatment   Daily Review of Necessity (SVN) completed   Respiratory Treatment Status (SVN) given   Treatment Route (SVN) mask   Patient Position (SVN) HOB elevated   Post Treatment Assessment (SVN) breath sounds unchanged   Signs of Intolerance (SVN) none   Education   $ Education Bronchodilator;15 min   Respiratory Evaluation   $ Care Plan Tech Time 15 min   Evaluation For New Orders

## 2020-10-03 NOTE — PLAN OF CARE
This note also relates to the following rows which could not be included:  SpO2 - Cannot attach notes to unvalidated device data  Pulse - Cannot attach notes to unvalidated device data  Resp - Cannot attach notes to unvalidated device data  ETCO2 (mmHg) - Cannot attach notes to unvalidated device data       10/03/20 0700   PRE-TX-O2   O2 Device (Oxygen Therapy) nasal cannula   Flow (L/min) 3   Pulse Oximetry Type Continuous   $ Pulse Oximetry - Multiple Charge Pulse Oximetry - Multiple   Aerosol Therapy   $ Aerosol Therapy Charges Aerosol Treatment;Refused   consider discontinue tx, patient states treatments dont help

## 2020-10-03 NOTE — PT/OT/SLP EVAL
"Occupational Therapy   Evaluation and Discharge Note    Name: Griselda Neely  MRN: [id number]  Admitting Diagnosis:  Acute blood loss anemia 1 Day Post-Op    Recommendations:     Discharge Recommendations: home  Discharge Equipment Recommendations:  (pt's irritable affect impeaded OT interview from formally occuring to obtain full information; pt refusing at points of the OT evaluation performance; unable to make appropriate recommendations)  Barriers to discharge:  None    Assessment:     Griselda Neely is a 75 y.o. female with a medical diagnosis of Acute blood loss anemia who was found extremely upset and irritated regarding not being allowed to ambulate until this time with OT per nursing care. At this time, patient is functioning at their prior level of function and does not require further acute OT services.     Plan:     During this hospitalization, patient does not require further acute OT services.  Please re-consult if situation changes.    · Plan of Care Reviewed with: patient, son    Subjective     Chief Complaint: "This is ridiculous; why can't y'all tell me why I couldn't get up until they came here" pt very upset and irritable about not being able to walk when she had asked the nurses  Patient/Family Comments/goals: "Mom they are here to help you get up" "I want to walk in the hallway" pt needed education on role of OT for room ambulation for ADL's but the pt was upset initially with this information as evidence by the pt stating, "I don't want to do anything but walk in the hallway; this is ridiculous"; the OT offered room ambulation and the son was able to convince the pt to at least walk in the room to the window and bathroom for ADL assessment in which the pt decided to participate.     Occupational Profile:  Living Environment: pt's irritable affect impeaded OT interview from formally occuring to obtain full information; pt refusing at points of the OT evaluation performance  Previous level of " "function: RW ambulation   Roles and Routines: ?; lives with her sister;  pt's irritable affect impeaded OT interview from formally occuring to obtain full information; pt refusing at points of the OT evaluation performance    Equipment Used at home:  walker, rolling(pt's irritable affect impeaded OT interview from formally occuring to obtain full information; pt refusing at points of the OT evaluation performance)  Assistance upon Discharge: family    Pain/Comfort:  Pain Rating 1: 0/10  Location - Side 1: Left  Location - Orientation 1: posterior  Location 1: hand  Pain Addressed 1: Reposition, Nurse notified(moist heat applied)  Pain Rating Post-Intervention 1: (during mobiltiy, pt's left hand is painful; swollen;)    Patients cultural, spiritual, Christianity conflicts given the current situation: no    Objective:     Communicated with: Nursing and nurse extender prior to session.  Patient found sitting on the side of the bed with her son next to her; pt upset and wanting to have walked in the hallway with nursing; pt with impaired insight as she states, " I don't understand why I just can't get up and walk in her when I want to"; pt with extensive medical lines attached with OT explaining and pt appeared to be more satisfied after the explanation with blood pressure cuff, telemetry, pulse ox (continuous), peripheral IV upon OT entry to room.    General Precautions: Standard, fall   Orthopedic Precautions:N/A   Braces: N/A     Occupational Performance:    Functional Mobility/Transfers:  · Patient completed Sit <> Stand Transfer with supervision  with  rolling walker   · Patient completed Bed <> Chair Transfer using Step Transfer technique with stand by assistance with rolling walker  · Patient completed Toilet Transfer Transfer: refused: "No; I'm not doing that"  · Functional Mobility: room ambulation from the bedside to the window ~ 18 feet with RW and SBA/supervision; from the window to the bedside chair ~ 14 " "feet;     Activities of Daily Living:  · Feeding:  independence however, the pt states, "lady; they aren't letting me eat; I haven't eaten in 3 days" OT attempts to redirect the pt to the point regarding her abiltiy to bring the cup and a spoon to her mouth, however, pt cont with argumentative affect   · Grooming: set due to refusal to enter the bathroom; pt was happy to receive a comb from the OT; "Oh, thank you so much; can I keep it?"    · Lower Body Dressing: pt refuses to cooperate; 'why do I have to do all this?"; OT attempts to explain however,the pt is resistive to receiving education     · Toileting:  pt refuses to cooperate; 'why do I have to do all this?"; OT attempts to explain however,the pt is resistive to receiving education    ·   Cognitive/Visual Perceptual: pt refuses to cooperate; 'why do I have to do all this?"; OT attempts to explain however,the pt is resistive to receiving education      Physical Exam:  Balance: -       good sitting balance; good - static standing balance  Skin integrity: Wound L medial thigh with coband wrapped dressing   Edema:  Moderate L dorsal hand; "they had to take out my IV"   Dominant hand: -       right  Upper Extremity Range of Motion:     -       Right Upper Extremity: WFL  -       Left Upper Extremity: WFL except dorsal left hand swollen, painful and limited wrist motion due to pain  Fine Motor Coordination:    -       Impaired  Left hand, manipulation of objects impaired due to swelling and pain    AMPAC 6 Click ADL:  AMPAC Total Score:  Pt refuses cooperation    Treatment & Education: role of OT, call don't fall; Therapeutic Activity: L UE positioning with elevation education, moist heat application and benefits; room ambulation with RW with SBA   Education:    Patient left up in chair with all lines intact, call button in reach and nurse and extender regarding pt's affect and refusals including chair belt  notified    GOALS:   Multidisciplinary Problems     " Occupational Therapy Goals     Not on file                History:     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    both legs    CHF (congestive heart failure)     Encounter for blood transfusion 03/2016    Gout     Hemodialysis access, AV graft     Hypertension     Mitral valve regurgitation     Osteoarthritis     Pancreatitis     Peripheral vascular disease     Pneumonia 09/09/2017    Renal failure        Past Surgical History:   Procedure Laterality Date    ACHILLES TENDON SURGERY Right     ANGIOGRAPHY OF ARTERIOVENOUS SHUNT Right 1/7/2020    Procedure: Fistulogram with Possible Intervention;  Surgeon: Joseph Loyola MD;  Location: [facility] CATH/EP LAB;  Service: Cardiology;  Laterality: Right;    ANGIOGRAPHY OF LOWER EXTREMITY Left 3/18/2020    Procedure: Angiogram Extremity Unilateral;  Surgeon: Ali Khoobehi, MD;  Location: [facility] CATH/EP LAB;  Service: Cardiology;  Laterality: Left;    APPENDECTOMY      CARDIAC CATHETERIZATION  07/03/2017    CHOLECYSTECTOMY      ESOPHAGOGASTRODUODENOSCOPY Left 8/17/2020    Procedure: EGD (ESOPHAGOGASTRODUODENOSCOPY);  Surgeon: Talat Trevizo MD;  Location: [facility] ENDO;  Service: Endoscopy;  Laterality: Left;    heal surgery Right     HYSTERECTOMY      INSERTION OF STENT INTO PERIPHERAL VESSEL N/A 1/7/2020    Procedure: INSERTION, STENT, VESSEL, PERIPHERAL;  Surgeon: Joseph Loyola MD;  Location: [facility] CATH/EP LAB;  Service: Cardiology;  Laterality: N/A;    MITRAL VALVE REPLACEMENT      PARATHYROIDECTOMY      PARATHYROIDECTOMY  07/13/2017    PERCUTANEOUS TRANSLUMINAL ANGIOPLASTY OF ARTERIOVENOUS FISTULA N/A 1/7/2020    Procedure: PTA, AV FISTULA;  Surgeon: Joseph Loyola MD;  Location: [facility] CATH/EP LAB;  Service: Cardiology;  Laterality: N/A;    PERITONEAL CATHETER INSERTION      RENAL BIOPSY      vocal cord nodule      WOUND DEBRIDEMENT Left 7/13/2020    Procedure: DEBRIDEMENT, WOUND;   Surgeon: Carlos Elam III, MD;  Location: [facility];  Service: General;  Laterality: Left;       Time Tracking:     OT Date of Treatment: 10/03/20  OT Start Time: 0928  OT Stop Time: 0953  OT Total Time (min): 25 min    Billable Minutes:Evaluation 10  Therapeutic Activity 15    Jany Rivero OT  10/3/46540:20 PM

## 2020-10-04 ENCOUNTER — ANESTHESIA EVENT (OUTPATIENT)
Dept: SURGERY | Facility: HOSPITAL | Age: 75
DRG: 377 | End: 2020-10-04
Payer: MEDICARE

## 2020-10-04 ENCOUNTER — ANESTHESIA (OUTPATIENT)
Dept: SURGERY | Facility: HOSPITAL | Age: 75
DRG: 377 | End: 2020-10-04
Payer: MEDICARE

## 2020-10-04 VITALS
OXYGEN SATURATION: 100 % | DIASTOLIC BLOOD PRESSURE: 86 MMHG | HEIGHT: 65 IN | WEIGHT: 117.94 LBS | BODY MASS INDEX: 19.65 KG/M2 | HEART RATE: 101 BPM | SYSTOLIC BLOOD PRESSURE: 156 MMHG | RESPIRATION RATE: 38 BRPM | TEMPERATURE: 98 F

## 2020-10-04 PROCEDURE — 25000003 PHARM REV CODE 250: Performed by: INTERNAL MEDICINE

## 2020-10-04 PROCEDURE — 27000675 HC TUBING MICRODRIP: Performed by: ANESTHESIOLOGY

## 2020-10-04 PROCEDURE — 99900035 HC TECH TIME PER 15 MIN (STAT)

## 2020-10-04 PROCEDURE — 27000221 HC OXYGEN, UP TO 24 HOURS

## 2020-10-04 PROCEDURE — 63600175 PHARM REV CODE 636 W HCPCS: Performed by: NURSE ANESTHETIST, CERTIFIED REGISTERED

## 2020-10-04 PROCEDURE — 45388 COLONOSCOPY W/ABLATION: CPT | Performed by: INTERNAL MEDICINE

## 2020-10-04 PROCEDURE — 37000009 HC ANESTHESIA EA ADD 15 MINS: Performed by: INTERNAL MEDICINE

## 2020-10-04 PROCEDURE — 63600175 PHARM REV CODE 636 W HCPCS: Performed by: INTERNAL MEDICINE

## 2020-10-04 PROCEDURE — 94761 N-INVAS EAR/PLS OXIMETRY MLT: CPT

## 2020-10-04 PROCEDURE — C9113 INJ PANTOPRAZOLE SODIUM, VIA: HCPCS | Performed by: INTERNAL MEDICINE

## 2020-10-04 PROCEDURE — 25000003 PHARM REV CODE 250: Performed by: NURSE ANESTHETIST, CERTIFIED REGISTERED

## 2020-10-04 PROCEDURE — 37000008 HC ANESTHESIA 1ST 15 MINUTES: Performed by: INTERNAL MEDICINE

## 2020-10-04 PROCEDURE — 27201038 HC PROBE, BI-POLAR: Performed by: INTERNAL MEDICINE

## 2020-10-04 PROCEDURE — 27202103: Performed by: ANESTHESIOLOGY

## 2020-10-04 RX ORDER — ASPIRIN 81 MG/1
81 TABLET ORAL DAILY
Qty: 30 TABLET | Refills: 0 | Status: SHIPPED | OUTPATIENT
Start: 2020-10-04 | End: 2020-10-04

## 2020-10-04 RX ORDER — SODIUM CHLORIDE 9 MG/ML
INJECTION, SOLUTION INTRAVENOUS CONTINUOUS PRN
Status: DISCONTINUED | OUTPATIENT
Start: 2020-10-04 | End: 2020-10-04

## 2020-10-04 RX ORDER — PROPOFOL 10 MG/ML
INJECTION, EMULSION INTRAVENOUS
Status: DISCONTINUED | OUTPATIENT
Start: 2020-10-04 | End: 2020-10-04

## 2020-10-04 RX ORDER — WARFARIN SODIUM 5 MG/1
5 TABLET ORAL DAILY
Qty: 30 TABLET | Refills: 0 | Status: SHIPPED | OUTPATIENT
Start: 2020-10-04 | End: 2020-10-04

## 2020-10-04 RX ORDER — ASPIRIN 81 MG/1
81 TABLET ORAL DAILY
Qty: 30 TABLET | Refills: 0 | Status: ON HOLD | OUTPATIENT
Start: 2020-10-07 | End: 2020-12-03 | Stop reason: HOSPADM

## 2020-10-04 RX ORDER — METOPROLOL SUCCINATE 50 MG/1
50 TABLET, EXTENDED RELEASE ORAL DAILY
Status: DISCONTINUED | OUTPATIENT
Start: 2020-10-05 | End: 2020-10-04 | Stop reason: HOSPADM

## 2020-10-04 RX ORDER — WARFARIN SODIUM 5 MG/1
5 TABLET ORAL DAILY
Qty: 30 TABLET | Refills: 0 | Status: SHIPPED | OUTPATIENT
Start: 2020-10-07 | End: 2020-10-26

## 2020-10-04 RX ADMIN — METOPROLOL SUCCINATE 25 MG: 25 TABLET, FILM COATED, EXTENDED RELEASE ORAL at 07:10

## 2020-10-04 RX ADMIN — PROPOFOL 20 MG: 10 INJECTION, EMULSION INTRAVENOUS at 01:10

## 2020-10-04 RX ADMIN — SODIUM CHLORIDE: 900 INJECTION, SOLUTION INTRAVENOUS at 12:10

## 2020-10-04 RX ADMIN — CALCIUM ACETATE 667 MG: 667 CAPSULE ORAL at 07:10

## 2020-10-04 RX ADMIN — PROPOFOL 30 MG: 10 INJECTION, EMULSION INTRAVENOUS at 01:10

## 2020-10-04 RX ADMIN — PANTOPRAZOLE SODIUM 8 MG/HR: 40 INJECTION, POWDER, FOR SOLUTION INTRAVENOUS at 07:10

## 2020-10-04 RX ADMIN — PANTOPRAZOLE SODIUM 8 MG/HR: 40 INJECTION, POWDER, FOR SOLUTION INTRAVENOUS at 02:10

## 2020-10-04 RX ADMIN — PROPOFOL 50 MG: 10 INJECTION, EMULSION INTRAVENOUS at 01:10

## 2020-10-04 RX ADMIN — OXYCODONE AND ACETAMINOPHEN 1 TABLET: 5; 325 TABLET ORAL at 07:10

## 2020-10-04 NOTE — PLAN OF CARE
Problem: Fall Injury Risk  Goal: Absence of Fall and Fall-Related Injury  Outcome: Adequate for Care Transition     Problem: Adult Inpatient Plan of Care  Goal: Plan of Care Review  Outcome: Adequate for Care Transition  Goal: Patient-Specific Goal (Individualization)  Outcome: Adequate for Care Transition  Goal: Absence of Hospital-Acquired Illness or Injury  Outcome: Adequate for Care Transition  Goal: Optimal Comfort and Wellbeing  Outcome: Adequate for Care Transition  Goal: Readiness for Transition of Care  Outcome: Adequate for Care Transition  Goal: Rounds/Family Conference  Outcome: Adequate for Care Transition     Problem: Device-Related Complication Risk (Hemodialysis)  Goal: Safe, Effective Therapy Delivery  Outcome: Adequate for Care Transition     Problem: Hemodynamic Instability (Hemodialysis)  Goal: Vital Signs Remain in Desired Range  Outcome: Adequate for Care Transition     Problem: Infection (Hemodialysis)  Goal: Absence of Infection Signs/Symptoms  Outcome: Adequate for Care Transition     Problem: Wound  Goal: Optimal Wound Healing  Outcome: Adequate for Care Transition     Problem: Skin Injury Risk Increased  Goal: Skin Health and Integrity  Outcome: Adequate for Care Transition

## 2020-10-04 NOTE — ANESTHESIA POSTPROCEDURE EVALUATION
Anesthesia Post Evaluation    Patient: Griselda Neely    Procedure(s) Performed: Procedure(s) (LRB):  COLONOSCOPY (Left)    Final Anesthesia Type: MAC    Patient location during evaluation: ICU  Patient participation: Yes- Able to Participate  Level of consciousness: awake and alert and oriented  Post-procedure vital signs: reviewed and stable  Pain management: adequate  Airway patency: patent    PONV status at discharge: No PONV  Anesthetic complications: no      Cardiovascular status: blood pressure returned to baseline, hemodynamically stable and stable  Respiratory status: unassisted, spontaneous ventilation and room air  Hydration status: euvolemic  Follow-up not needed.          Vitals Value Taken Time   /80 10/04/20 1423   Temp 36 10/04/20 1709   Pulse 88 10/04/20 1548   Resp 18 10/04/20 1548   SpO2 98 % 10/04/20 1424   Vitals shown include unvalidated device data.      No case tracking events are documented in the log.      Pain/Skip Score: Pain Rating Prior to Med Admin: 10 (10/4/2020  7:51 AM)  Pain Rating Post Med Admin: 0 (10/4/2020  8:38 AM)

## 2020-10-04 NOTE — H&P
[facility]   History & Physical    SUBJECTIVE:     Chief Complaint/Reason for Admission: melena    History of Present Illness:  melena    Facility-Administered Medications Prior to Admission   Medication    0.9%  NaCl infusion (for blood administration)     PTA Medications   Medication Sig    calcium acetate (PHOSLO) 667 mg capsule Take 667 mg by mouth once daily.     diltiaZEM (CARDIZEM CD) 180 MG 24 hr capsule Take 180 mg by mouth once daily.    folic acid/vit B complex and C (JENNIFER-KODAK ORAL) Take 1 tablet by mouth once daily.    isosorbide mononitrate (IMDUR) 30 MG 24 hr tablet Take 30 mg by mouth once daily.    losartan (COZAAR) 25 MG tablet Take 25 mg by mouth once daily.    magnesium oxide (MAG-OX) 400 mg (241.3 mg magnesium) tablet Take 1 tablet by mouth once daily (Patient taking differently: Take 400 mg by mouth once daily. )    metoprolol succinate (TOPROL-XL) 25 MG 24 hr tablet Take 1 tablet (25 mg total) by mouth nightly.    ondansetron (ZOFRAN-ODT) 4 MG TbDL Take 4 mg by mouth every 6 (six) hours as needed.    oxyCODONE-acetaminophen (PERCOCET) 5-325 mg per tablet Take 1 tablet by mouth every 6 (six) hours as needed for Pain.    pantoprazole (PROTONIX) 40 MG tablet Take 1 tablet (40 mg total) by mouth once daily.    traMADoL (ULTRAM) 50 mg tablet Take 50 mg by mouth every 8 (eight) hours as needed for Pain.    zinc sulfate 220 mg Tab tablet Take 220 mg by mouth every other day.    wheelchair An 1 Device by Misc.(Non-Drug; Combo Route) route as needed (needed for transport).       Review of patient's allergies indicates:  No Known Allergies    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    both legs    CHF (congestive heart failure)     Encounter for blood transfusion 03/2016    Gout     Hemodialysis access, AV graft     Hypertension     Mitral valve regurgitation     Osteoarthritis     Pancreatitis      Peripheral vascular disease     Pneumonia 09/09/2017    Renal failure      Past Surgical History:   Procedure Laterality Date    ACHILLES TENDON SURGERY Right     ANGIOGRAPHY OF ARTERIOVENOUS SHUNT Right 1/7/2020    Procedure: Fistulogram with Possible Intervention;  Surgeon: Joseph Loyola MD;  Location: [facility] CATH/EP LAB;  Service: Cardiology;  Laterality: Right;    ANGIOGRAPHY OF LOWER EXTREMITY Left 3/18/2020    Procedure: Angiogram Extremity Unilateral;  Surgeon: Ali Khoobehi, MD;  Location: [facility] CATH/EP LAB;  Service: Cardiology;  Laterality: Left;    APPENDECTOMY      CARDIAC CATHETERIZATION  07/03/2017    CHOLECYSTECTOMY      ESOPHAGOGASTRODUODENOSCOPY Left 8/17/2020    Procedure: EGD (ESOPHAGOGASTRODUODENOSCOPY);  Surgeon: Talat Trevizo MD;  Location: [facility] ENDO;  Service: Endoscopy;  Laterality: Left;    heal surgery Right     HYSTERECTOMY      INSERTION OF STENT INTO PERIPHERAL VESSEL N/A 1/7/2020    Procedure: INSERTION, STENT, VESSEL, PERIPHERAL;  Surgeon: Joseph Loyola MD;  Location: [facility] CATH/EP LAB;  Service: Cardiology;  Laterality: N/A;    MITRAL VALVE REPLACEMENT      PARATHYROIDECTOMY      PARATHYROIDECTOMY  07/13/2017    PERCUTANEOUS TRANSLUMINAL ANGIOPLASTY OF ARTERIOVENOUS FISTULA N/A 1/7/2020    Procedure: PTA, AV FISTULA;  Surgeon: Joseph Loyola MD;  Location: [facility] CATH/EP LAB;  Service: Cardiology;  Laterality: N/A;    PERITONEAL CATHETER INSERTION      RENAL BIOPSY      vocal cord nodule      WOUND DEBRIDEMENT Left 7/13/2020    Procedure: DEBRIDEMENT, WOUND;  Surgeon: Carlos Elam III, MD;  Location: [facility] OR;  Service: General;  Laterality: Left;     Family History   Problem Relation Age of Onset    Heart disease Sister     Early death Sister         heart as baby    Heart disease Maternal Grandfather     Diabetes Mother     Hypertension Mother     Heart failure Mother     Heart disease Mother     Arthritis Mother     Diabetes Father     Early  death Sister         infant     Social History     Tobacco Use    Smoking status: Current Some Day Smoker     Packs/day: 0.50     Years: 45.00     Pack years: 22.50     Types: Cigarettes    Smokeless tobacco: Never Used   Substance Use Topics    Alcohol use: No    Drug use: No        Review of Systems:     No fever or chills.     No chest pain.     No shortness of breath.        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

## 2020-10-04 NOTE — DISCHARGE SUMMARY
[facility]  Discharge Summary  Patient Name: Griselda Neely MRN: [id number]   Patient Class: IP- Inpatient  Length of Stay: 3   Admission Date: 10/1/2020  1:58 PM Attending Physician: Hola Rubio MD   Primary Care Provider: Kalie Neely MD Face-to-Face encounter date: 10/04/2020   Chief Complaint: Rectal Bleeding (blood in stool  had blood transfuion today)    Date of Discharge: 10/4/2020  Discharge Disposition: Home  Condition: Stable    Reason for Hospitalization   Primary Diagnosis: GI bleeding    Secondary Diagnosis: ABLA s/p 3U, ESRD, hyponatremia, COPD  Atrial fibrillation with RVR  Chronic systolic CHF  PVD  Calciphylaxis      Patient Active Problem List   Diagnosis    HTN (hypertension)    PND (paroxysmal nocturnal dyspnea)    Tobacco dependence    ESRD (end stage renal disease) on HD M,W, F    Hyperparathyroidism    Other persistent atrial fibrillation    Anemia due to chronic kidney disease    Pulmonary hypertension    Non-rheumatic mitral regurgitation    DNR (do not resuscitate)    Acute on chronic respiratory failure    Coronary arteriosclerosis in native artery    Gastroesophageal reflux disease    S/P parathyroidectomy    Tension-type headache    Hyperparathyroidism due to renal insufficiency    Functional dyspepsia    HLD (hyperlipidemia)    PVD (peripheral vascular disease)    Encephalopathy    Chronic systolic heart failure    Peripheral vascular disease now with left lower extremity occlusion    H/O mitral valve replacement    Gout    Anemia    Chronic pain on chronic narcotics    Left leg pain    Volume overload    Calciphylaxis    Encephalopathy chronic    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    Malnutrition of moderate degree    Visit for wound check    Lower GI bleed    Atrial thrombus    Long term current use of anticoagulant    Poorly-controlled hypertension    Acute on chronic  "systolic heart failure    Acute blood loss anemia    Acute upper GI bleeding       Brief History of Present Illness    Griselda Neely is a 75 y.o. female who  has a past medical history of Anemia, Atrial fibrillation, Calciphylaxis (07/2017), CHF (congestive heart failure), Encounter for blood transfusion (03/2016), Gout, Hemodialysis access, AV graft, Hypertension, Mitral valve regurgitation, Osteoarthritis, Pancreatitis, Peripheral vascular disease, Pneumonia (09/09/2017), and Renal failure.. The patient presented to [facility] on 10/1/2020 with a primary complaint of Rectal Bleeding (blood in stool  had blood transfuion today)    For the full HPI please refer to the History & Physical from this admission.    Hospital Course By Problem with Pertinent Findings     Patient admitted for GI bleeding workup after presenting with dark black stools. Patient had EGD remained unremarkable and colonscopy showed colonic angioectasia. Treated with a heated probe.  . Patient advised to starte back again on Warfarin in 3 days. Patient will follow up with PCP in 1 week time. Patient was made DNR/DNI during this admission.         Physical Exam  BP (!) 156/86   Pulse 101   Temp 97.6 °F (36.4 °C) (Oral)   Resp (!) 38   Ht 5' 5" (1.651 m)   Wt 53.5 kg (117 lb 15.1 oz)   LMP  (LMP Unknown)   SpO2 100%   Breastfeeding No   BMI 19.63 kg/m²   Vitals reviewed.    Constitutional: No distress.   HENT: Atraumatic.   Cardiovascular: Normal rate, regular rhythm and normal heart sounds.   Pulmonary/Chest: Effort normal. Clear to auscultation bilaterally. No wheezes.   Abdominal: Soft. Bowel sounds are normal. Exhibits no distension and no mass. No tenderness  Neurological: Alert.   Skin: Skin is warm and dry.     Following labs were Reviewed   No results for input(s): WBC, HGB, HCT, PLT, GLUCOSE, CALCIUM, ALBUMIN, PROT, NA, K, CO2, CL, BUN, CREATININE, ALKPHOS, ALT, AST, BILITOT in the last 24 hours.  No results " found for: POCTGLUCOSE     All labs within the past 24 hours have been reviewed    Microbiology Results (last 7 days)     ** No results found for the last 168 hours. **        X-Ray Chest AP Portable   Final Result          No results found for this or any previous visit.      Consultants and Procedures   Consultants:  Nephrology  Cardiology  Gastroenterology    Procedures:   EGD  Colonscopy    Discharge Information:   Diet:  Resume regular diet    Physical Activity:  Activity as tolerated    Instructions:  1. Take all medications as prescribed  2. Keep all follow-up appointments  3. Return to the hospital or call your primary care physicians if any worsening symptoms such as chest pain, shortness of breath occur.  4. Hold Warfarin and aspirin for 3 days.     Follow-Up Appointments:  1. Please call your primary care physician to schedule an appointment in 1 week time.  2. Please call your Gastroenterologist to schedule an appointment in 1 week time.      Pending laboratory work/Tests to be performed/followed by the Primary care Physician: None    The patient was discharged in the care of her parents//wife/family/caregiver, with discharge instructions were reviewed in written and verbal form. All pertinent questions were discussed and prescriptions were provided. The importance of making follow up appointments and compliance of medications has been stressed repeatedly. The patient will follow up in 1 week or sooner as needed with the PCP, and the patient is on board with the plan. Upon discharge, patient needs to be on following medications.    Discharge Medications:     Medication List      CHANGE how you take these medications    warfarin 5 MG tablet  Commonly known as: COUMADIN  Take 1 tablet (5 mg total) by mouth Daily.  What changed: when to take this        CONTINUE taking these medications    aspirin 81 MG EC tablet  Commonly known as: ECOTRIN  Take 1 tablet (81 mg total) by mouth once daily.      calcium acetate(phosphat bind) 667 mg capsule  Commonly known as: PHOSLO     diltiaZEM 180 MG 24 hr capsule  Commonly known as: CARDIZEM CD     isosorbide mononitrate 30 MG 24 hr tablet  Commonly known as: IMDUR     losartan 25 MG tablet  Commonly known as: COZAAR     magnesium oxide 400 mg (241.3 mg magnesium) tablet  Commonly known as: MAG-OX  Take 1 tablet by mouth once daily     metoprolol succinate 25 MG 24 hr tablet  Commonly known as: TOPROL-XL  Take 1 tablet (25 mg total) by mouth nightly.     ondansetron 4 MG Tbdl  Commonly known as: ZOFRAN-ODT     oxyCODONE-acetaminophen 5-325 mg per tablet  Commonly known as: PERCOCET     pantoprazole 40 MG tablet  Commonly known as: PROTONIX  Take 1 tablet (40 mg total) by mouth once daily.     JENNIFER-KODAK ORAL     traMADoL 50 mg tablet  Commonly known as: ULTRAM     wheelchair An  1 Device by Misc.(Non-Drug; Combo Route) route as needed (needed for transport).     zinc sulfate 220 mg Tab tablet           Where to Get Your Medications      These medications were sent to [facility] [address] [place] [id number]    Phone: [phone]   · aspirin 81 MG EC tablet  · warfarin 5 MG tablet           I spent 45 minutes preparing the discharge including reviewing records from previous encounters, preparation of discharge summary, assessing and final examination of the patient, discharge medicine reconciliation, discussing plan of care, follow up and education and prescriptions.       Hola Rubio  [facility] Hospitalist  10/04/2020

## 2020-10-04 NOTE — [employer] PATIENT INSTRUCTIONS
Discharge Summary/Instructions after an Endoscopic Procedure  Patient Name: Griselda Neely  Patient MRN: [id number]  Patient YOB: 1945 Sunday, October 4, 2020  Jordan Kilpatrick MD  RESTRICTIONS:  During your procedure today, you received medications for sedation.  These   medications may affect your judgment, balance and coordination.  Therefore,   for 24 hours, you have the following restrictions:   - DO NOT drive a car, operate machinery, make legal/financial decisions,   sign important papers or drink alcohol.    ACTIVITY:  Today: no heavy lifting, straining or running due to procedural   sedation/anesthesia.  The following day: return to full activity including work.  DIET:  Eat and drink normally unless instructed otherwise.     TREATMENT FOR COMMON SIDE EFFECTS:  - Mild abdominal pain, nausea, belching, bloating or excessive gas:  rest,   eat lightly and use a heating pad.  - Sore Throat: treat with throat lozenges and/or gargle with warm salt   water.  - Because air was used during the procedure, expelling large amounts of air   from your rectum or belching is normal.  - If a bowel prep was taken, you may not have a bowel movement for 1-3 days.    This is normal.  SYMPTOMS TO WATCH FOR AND REPORT TO YOUR PHYSICIAN:  1. Abdominal pain or bloating, other than gas cramps.  2. Chest pain.  3. Back pain.  4. Signs of infection such as: chills or fever occurring within 24 hours   after the procedure.  5. Rectal bleeding, which would show as bright red, maroon, or black stools.   (A tablespoon of blood from the rectum is not serious, especially if   hemorrhoids are present.)  6. Vomiting.  7. Weakness or dizziness.  GO DIRECTLY TO THE NEAREST EMERGENCY ROOM IF YOU HAVE ANY OF THE FOLLOWING:      Difficulty breathing              Chills and/or fever over 101 F   Persistent vomiting and/or vomiting blood   Severe abdominal pain   Severe chest pain   Black, tarry stools   Bleeding- more than one  tablespoon   Any other symptom or condition that you feel may need urgent attention  Your doctor recommends these additional instructions:  If any biopsies were taken, your doctors clinic will contact you in 1 to 2   weeks with any results.  - Ok to advance diet as tolerated.  Ok to d/c home later today if no further   signs of bleeding.   - Ok to resume Coumadin in 3 days  - F/u with Dr. Trevizo in office in 1-2 weeks after discharge  - Suspect patient may have additional small bowel AVMs.  She may benefit   from capsule endoscopy, which can be arranged in the outpatient setting.   - Discharge patient to home.  For questions, problems or results please call your physician - Jordan Kilpatrick MD at Work:  [phone].  [facility], EMERGENCY ROOM PHONE NUMBER: [phone]  IF A COMPLICATION OR EMERGENCY SITUATION ARISES AND YOU ARE UNABLE TO REACH   YOUR PHYSICIAN - GO DIRECTLY TO THE EMERGENCY ROOM.  Jordan Kilpatrick MD  10/4/2020 2:34:17 PM  This report has been verified and signed electronically.  [employer]

## 2020-10-04 NOTE — NURSING
"Pt colonoscopy complete,   Dr Manning notified---  Dr boyce states pt may D/C to home, and f/u with Dr Gonzalez  And to hold coumadin for 3 days.        Pt awake, agitated, demanding food from staff.  When asked what type of food , pt states,  " Just food!"    Meal delivered from dietary, pt states  " Not that food, ya'll can't even give people food that they want!"   Asked again what she would like to be served, pt doesn't reveal this.      Pt shouting at staff.       Attempts made to please and console pt without success.    "

## 2020-10-04 NOTE — TRANSFER OF CARE
"Anesthesia Transfer of Care Note    Patient: Griselda Neely    Procedure(s) Performed: Procedure(s) (LRB):  COLONOSCOPY (Left)    Patient location: ICU    Anesthesia Type: general    Transport from OR: Transported from OR on 100% O2 by closed face mask with adequate spontaneous ventilation. Continuous ECG monitoring in transport. Continuous SpO2 monitoring in transport. Continuos invasive BP monitoring in transport    Post pain: adequate analgesia    Post assessment: no apparent anesthetic complications    Post vital signs: stable    Nausea/Vomiting: no nausea/vomiting    Complications: none    Transfer of care protocol was followed      Last vitals:   Visit Vitals  BP (!) 156/86   Pulse 95   Temp 36.4 °C (97.6 °F) (Oral)   Resp (!) 29   Ht 5' 5" (1.651 m)   Wt 53.5 kg (117 lb 15.1 oz)   LMP  (LMP Unknown)   SpO2 100%   Breastfeeding No   BMI 19.63 kg/m²     "

## 2020-10-04 NOTE — CARE UPDATE
This note also relates to the following rows which could not be included:  Pulse - Cannot attach notes to unvalidated device data  Resp - Cannot attach notes to unvalidated device data       10/04/20 0838   Patient Assessment/Suction   Level of Consciousness (AVPU) alert   Respiratory Effort Normal;Unlabored   Expansion/Accessory Muscles/Retractions no use of accessory muscles   All Lung Fields Breath Sounds coarse   PRE-TX-O2   O2 Device (Oxygen Therapy) nasal cannula   $ Is the patient on Low Flow Oxygen? Yes   Flow (L/min) 2   Pulse Oximetry Type Continuous   $ Pulse Oximetry - Multiple Charge Pulse Oximetry - Multiple   Positioning HOB elevated 45 degrees   Aerosol Therapy   $ Aerosol Therapy Charges PRN treatment not required   Respiratory Evaluation   $ Care Plan Tech Time 15 min

## 2020-10-04 NOTE — PLAN OF CARE
Spoke with Dr Eason about discontinuing cardizem drip. He said was not seeing pt today not on list of Dr Frazier  Message  sent to Dr Rubio concerning this and haven't received reply

## 2020-10-04 NOTE — NURSING
Pt has tolerated 12 packs of Mirilax in 48 ounces of gatorade. Stools not yet clear. Explained the importance of clear stools for procedure. Pt refuses to drink anymore. Has c/o some nausea.Will cont to monitor

## 2020-10-04 NOTE — ANESTHESIA PREPROCEDURE EVALUATION
10/04/2020  Griselda Neely is a 75 y.o., female.    Anesthesia Evaluation    I have reviewed the Patient Summary Reports.    I have reviewed the Nursing Notes.    I have reviewed the Medications.     Review of Systems  Anesthesia Hx:  No problems with previous Anesthesia  Neg history of prior surgery. Denies Family Hx of Anesthesia complications.   Denies Personal Hx of Anesthesia complications.   Social:  No Alcohol Use, Smoker    Hematology/Oncology:         -- Anemia: Hematology Comments: Hx of transfusion   Cardiovascular:   Hypertension CAD  Dysrhythmias atrial fibrillation CHF PND    Pulmonary:   Shortness of breath    Renal/:   Chronic Renal Disease, ESRD, Dialysis    Hepatic/GI:   GERD    Musculoskeletal:   Arthritis  Gout   Neurological:   Headaches        Patient Active Problem List   Diagnosis    HTN (hypertension)    PND (paroxysmal nocturnal dyspnea)    Tobacco dependence    ESRD (end stage renal disease) on HD M,W, F    Hyperparathyroidism    Other persistent atrial fibrillation    Anemia due to chronic kidney disease    Pulmonary hypertension    Non-rheumatic mitral regurgitation    DNR (do not resuscitate)    Acute on chronic respiratory failure    Coronary arteriosclerosis in native artery    Gastroesophageal reflux disease    S/P parathyroidectomy    Tension-type headache    Hyperparathyroidism due to renal insufficiency    Functional dyspepsia    HLD (hyperlipidemia)    PVD (peripheral vascular disease)    Encephalopathy    Chronic systolic heart failure    Peripheral vascular disease now with left lower extremity occlusion    H/O mitral valve replacement    Gout    Anemia    Chronic pain on chronic narcotics    Left leg pain    Volume overload    Calciphylaxis    Encephalopathy chronic    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  fat layer exposed    Malnutrition of moderate degree    Visit for wound check    Lower GI bleed    Atrial thrombus    Long term current use of anticoagulant    Poorly-controlled hypertension    Acute on chronic systolic heart failure    Acute blood loss anemia    Acute upper GI bleeding       Past Surgical History:   Procedure Laterality Date    ACHILLES TENDON SURGERY Right     ANGIOGRAPHY OF ARTERIOVENOUS SHUNT Right 1/7/2020    Procedure: Fistulogram with Possible Intervention;  Surgeon: Joseph Loyola MD;  Location: [facility] CATH/EP LAB;  Service: Cardiology;  Laterality: Right;    ANGIOGRAPHY OF LOWER EXTREMITY Left 3/18/2020    Procedure: Angiogram Extremity Unilateral;  Surgeon: Ali Khoobehi, MD;  Location: [facility] CATH/EP LAB;  Service: Cardiology;  Laterality: Left;    APPENDECTOMY      CARDIAC CATHETERIZATION  07/03/2017    CHOLECYSTECTOMY      ESOPHAGOGASTRODUODENOSCOPY Left 8/17/2020    Procedure: EGD (ESOPHAGOGASTRODUODENOSCOPY);  Surgeon: Talat Trevizo MD;  Location: [facility] ENDO;  Service: Endoscopy;  Laterality: Left;    heal surgery Right     HYSTERECTOMY      INSERTION OF STENT INTO PERIPHERAL VESSEL N/A 1/7/2020    Procedure: INSERTION, STENT, VESSEL, PERIPHERAL;  Surgeon: Joseph Loyola MD;  Location: [facility] CATH/EP LAB;  Service: Cardiology;  Laterality: N/A;    MITRAL VALVE REPLACEMENT      PARATHYROIDECTOMY      PARATHYROIDECTOMY  07/13/2017    PERCUTANEOUS TRANSLUMINAL ANGIOPLASTY OF ARTERIOVENOUS FISTULA N/A 1/7/2020    Procedure: PTA, AV FISTULA;  Surgeon: Joseph Loyola MD;  Location: [facility] CATH/EP LAB;  Service: Cardiology;  Laterality: N/A;    PERITONEAL CATHETER INSERTION      RENAL BIOPSY      vocal cord nodule      WOUND DEBRIDEMENT Left 7/13/2020    Procedure: DEBRIDEMENT, WOUND;  Surgeon: Carlos Elam III, MD;  Location: [facility] OR;  Service: General;  Laterality: Left;        Tobacco Use:  The patient  reports that she has been smoking cigarettes. She has a  22.50 pack-year smoking history. She has never used smokeless tobacco.     Results for orders placed or performed during the hospital encounter of 10/01/20   EKG 12-lead    Collection Time: 10/02/20  4:45 PM    Narrative    Test Reason : I48.91,    Vent. Rate : 097 BPM     Atrial Rate : 097 BPM     P-R Int : 000 ms          QRS Dur : 092 ms      QT Int : 372 ms       P-R-T Axes : 000 -38 094 degrees     QTc Int : 472 ms    Atrial fibrillation  Left axis deviation  Nonspecific ST and T wave abnormality  Prolonged QT  Abnormal ECG  When compared with ECG of 01-OCT-2020 14:39,  No significant change was found    Referred By: [email or web address]   SELF           Confirmed By:         Imaging Results          X-Ray Chest AP Portable (Final result)  Result time 10/01/20 15:03:57   Procedure changed from X-Ray Chest 1 View     Final result by Yang Chamorro MD (10/01/20 15:03:57)                 Narrative:    REASON: dyspnea Dyspnea, Rectal Bleeding?(blood in stool had blood  transfuion today)    FINDINGS:    Portable chest radiograph with comparison chest x-ray September 20, 2020. There is unchanged enlargement of the cardiomediastinal  silhouette. Aortic prosthetic valve again noted. Prominence of the  pulmonary hilar vascular structures. Bilateral mixed interstitial and  airspace opacities of the lungs again noted, likely reflecting  pulmonary edema. Bilateral pleural effusions are unchanged, right  greater than left. No acute osseous abnormality.    IMPRESSION:    CHF lung pattern with enlarged cardiomediastinal silhouette, bilateral  mixed interstitial and airspace opacities and pleural effusions as  described.    Electronically Signed by Yang Chamorro on 10/1/2020 3:06 PM                               Lab Results   Component Value Date    WBC 5.30 10/03/2020    WBC 5.30 10/03/2020    HGB 8.8 (L) 10/03/2020    HGB 8.8 (L) 10/03/2020    HCT 28.2 (L) 10/03/2020    HCT 28.2 (L) 10/03/2020    MCV 93 10/03/2020    MCV 93  10/03/2020     10/03/2020     10/03/2020     BMP  Lab Results   Component Value Date     (L) 10/03/2020    K 4.5 10/03/2020    CL 98 10/03/2020    CO2 23 10/03/2020    BUN 25 (H) 10/03/2020    CREATININE 3.3 (H) 10/03/2020    CALCIUM 8.3 (L) 10/03/2020    ANIONGAP 12 10/03/2020    GLU 87 10/03/2020    GLU 68 (L) 10/02/2020     10/01/2020     Results for SRIKANTH LUONG (MRN [id number]) as of 10/4/2020 12:09   Ref. Range 10/2/2020 05:04   INR Unknown 2.0     Results for orders placed during the hospital encounter of 01/02/20   Echo Color Flow Doppler? Yes    Narrative · Eccentric left ventricular hypertrophy.  · Severely decreased left ventricular systolic function. The estimated   ejection fraction is 25%.  · Grade IV (severe) left ventricular diastolic dysfunction.  · Moderate right ventricular enlargement.  · Moderately reduced right ventricular systolic function.  · Severe left atrial enlargement.  · Layered left atrial thrombus suggested. The thrombus is fixed and   located in the inferior cavity.  · Moderate right atrial enlargement.  · There is a bioprosthetic mitral valve. Prosthetic mitral valve is   normal.  · Moderate tricuspid regurgitation.  · Mild to moderate pulmonary hypertension present. Estimated PA systolic   pressure 50 mm of Hg.  · Mild to moderate pulmonic regurgitation.  · Small posterior pericardial effusion.              Physical Exam  General:  Well nourished    Airway/Jaw/Neck:  Airway Findings: Mouth Opening: Normal Tongue: Normal  General Airway Assessment: Adult  Mallampati: II  Improves to II with phonation.  TM Distance: Normal, at least 6 cm  Jaw/Neck Findings:  Neck ROM: Normal ROM       Chest/Lungs:  Chest/Lungs Findings: Clear to auscultation, Normal Respiratory Rate     Heart/Vascular:  Heart Findings: Rate: Normal  Rhythm: Irregularly Irregular  Sounds: Normal        Mental Status:  Mental Status Findings:  Cooperative, Alert and Oriented          Anesthesia Plan  Type of Anesthesia, risks & benefits discussed:  Anesthesia Type:  MAC  Patient's Preference:   Intra-op Monitoring Plan: standard ASA monitors  Intra-op Monitoring Plan Comments:   Post Op Pain Control Plan: multimodal analgesia  Post Op Pain Control Plan Comments:   Induction:   IV  Beta Blocker:  Patient is not currently on a Beta-Blocker (No further documentation required).       Informed Consent: Patient understands risks and agrees with Anesthesia plan.  Questions answered. Anesthesia consent signed with patient.  ASA Score: 3     Day of Surgery Review of History & Physical:    H&P update referred to the provider.     Anesthesia Plan Notes: Minidrip for fluids  Propofol        Ready For Surgery From Anesthesia Perspective.

## 2020-10-04 NOTE — RESPIRATORY THERAPY
10/03/20 2012   Patient Assessment/Suction   Respiratory Effort Unlabored   All Lung Fields Breath Sounds clear   Rhythm/Pattern, Respiratory pattern regular   PRE-TX-O2   O2 Device (Oxygen Therapy) nasal cannula   $ Is the patient on Low Flow Oxygen? Yes   Flow (L/min) 2   SpO2 100 %   Pulse 72   Resp 19   Aerosol Therapy   $ Aerosol Therapy Charges Aerosol Treatment   Respiratory Treatment Status (SVN) given   Treatment Route (SVN) mask   Patient Position (SVN) HOB elevated   Post Treatment Assessment (SVN) breath sounds unchanged   Signs of Intolerance (SVN) none   Breath Sounds Post-Respiratory Treatment   Throughout All Fields Post-Treatment All Fields   Throughout All Fields Post-Treatment no change   Post-treatment Heart Rate (beats/min) 73   Post-treatment Resp Rate (breaths/min) 20   Respiratory Evaluation   $ Care Plan Tech Time 15 min   Admitting Diagnosis ANEMIA   Pulmonary Diagnosis COPD

## 2020-10-04 NOTE — DISCHARGE INSTRUCTIONS
Diet:  Resume regular diet     Physical Activity:  Activity as tolerated     Instructions:  1. Take all medications as prescribed  2. Keep all follow-up appointments  3. Return to the hospital or call your primary care physicians if any worsening symptoms such as chest pain, shortness of breath occur.  4. Hold Warfarin and aspirin for 3 days.      Follow-Up Appointments:  1. Please call your primary care physician to schedule an appointment in 1 week time.  2. Please call your Gastroenterologist to schedule an appointment in 1 week time.

## 2020-10-04 NOTE — PROGRESS NOTES
[facility]  Nephrology  Progress Note    Patient Name: Griselda Neely  MRN: [id number]  Admission Date: 10/1/2020  Hospital Length of Stay: 3 days  Attending Provider: Hola Rubio MD   Primary Care Physician: Kalie Neely MD  Principal Problem:Acute blood loss anemia      Subjective:     Interval History: No overnight complications. Lying in bed with no complaints other then she is hungry. Plan for is for colonoscopy today.    Review of patient's allergies indicates:  No Known Allergies  Current Facility-Administered Medications   Medication Frequency    0.9%  NaCl infusion (for blood administration) Q24H PRN    0.9%  NaCl infusion (for blood administration) Q24H PRN    0.9%  NaCl infusion PRN    0.9%  NaCl infusion Once    0.9%  NaCl infusion PRN    0.9%  NaCl infusion Once    calcium acetate(phosphat bind) capsule 667 mg Daily    diltiaZEM 125 mg in D5W 125 mL infusion Continuous    epoetin alfonzo-epbx injection 5,400 Units Every Mon, Wed, Fri    fentaNYL injection 25 mcg Q2H PRN    human prothrombin complex (PCC) (KCENTRA) 1,872.5 Units in sterile water for injection IVPB Once    levalbuterol nebulizer solution 0.63 mg Q6H PRN    metoprolol succinate (TOPROL-XL) 24 hr tablet 25 mg Daily    mupirocin 2 % ointment BID    ondansetron disintegrating tablet 8 mg Q8H PRN    oxyCODONE-acetaminophen 5-325 mg per tablet 1 tablet Q4H PRN    pantoprazole 40 mg in dextrose 5 % 100 mL infusion (ready to mix system) Continuous    promethazine (PHENERGAN) 6.25 mg in dextrose 5 % 50 mL IVPB Q6H PRN    sodium chloride 0.9% flush 10 mL PRN    sodium thiosulfate 12.5 gram/50 mL (250 mg/mL) injection 25 g Every Mon, Wed, Fri       Objective:     Vital Signs (Most Recent):  Temp: 97.7 °F (36.5 °C) (10/04/20 0728)  Pulse: 103 (10/04/20 0838)  Resp: (!) 21 (10/04/20 0838)  BP: (!) 165/88 (10/04/20 0300)  SpO2: (!) 80 % (10/04/20 0745)  O2 Device (Oxygen Therapy): nasal cannula (10/04/20  0838) Vital Signs (24h Range):  Temp:  [97.7 °F (36.5 °C)-98.3 °F (36.8 °C)] 97.7 °F (36.5 °C)  Pulse:  [] 103  Resp:  [14-33] 21  SpO2:  [80 %-100 %] 80 %  BP: (118-165)/(64-98) 165/88     Weight: 53.5 kg (117 lb 15.1 oz) (10/03/20 0903)  Body mass index is 19.63 kg/m².  Body surface area is 1.57 meters squared.    I/O last 3 completed shifts:  In: 2420 [P.O.:1700; I.V.:720]  Out: -     Physical Exam  Constitutional:       Appearance: Normal appearance.   HENT:      Head: Normocephalic.      Mouth/Throat:      Mouth: Mucous membranes are dry.      Pharynx: Oropharynx is clear.   Eyes:      Extraocular Movements: Extraocular movements intact.      Conjunctiva/sclera: Conjunctivae normal.      Pupils: Pupils are equal, round, and reactive to light.   Cardiovascular:      Rate and Rhythm: Normal rate. Rhythm irregular.      Pulses: Normal pulses.      Heart sounds: Normal heart sounds.      Comments: AFIB  Pulmonary:      Effort: Pulmonary effort is normal.      Breath sounds: Normal breath sounds.   Abdominal:      General: Bowel sounds are normal.      Palpations: Abdomen is soft.   Musculoskeletal: Normal range of motion.   Skin:     General: Skin is warm and dry.      Capillary Refill: Capillary refill takes less than 2 seconds.   Neurological:      Mental Status: She is alert and oriented to person, place, and time. Mental status is at baseline.   Psychiatric:         Mood and Affect: Mood normal.         Behavior: Behavior normal.         Thought Content: Thought content normal.         Judgment: Judgment normal.         Significant Labs:sure  BMP:   Recent Labs   Lab 10/03/20  0550   GLU 87   CL 98   CO2 23   BUN 25*   CREATININE 3.3*   CALCIUM 8.3*   MG 1.9     CBC:   Recent Labs   Lab 10/03/20  0550   WBC 5.30  5.30   RBC 3.02*  3.02*   HGB 8.8*  8.8*   HCT 28.2*  28.2*     155   MCV 93  93   MCH 29.1  29.1   MCHC 31.2*  31.2*     CMP:   Recent Labs   Lab 10/01/20  1503  10/03/20  0550       < > 87   CALCIUM 7.8*   < > 8.3*   ALBUMIN 2.6*  --   --    PROT 6.0  --   --       < > 133*   K 5.6*   < > 4.5   CO2 24   < > 23   CL 95   < > 98   BUN 83*   < > 25*   CREATININE 4.7*   < > 3.3*   ALKPHOS 94  --   --    ALT 16  --   --    AST 20  --   --    BILITOT 0.7  --   --     < > = values in this interval not displayed.     All labs within the past 24 hours have been reviewed.    Significant Imaging:  Labs: Reviewed    Assessment/Plan:     GI bleed:  -S/P 3 units PRBC; post Hgb 8.8g/dL  -EGD negative; await colonoscopy on Sunday  -getting Protonix IV  -clear liquids for now  -coumadin and ASA stopped  -most recent PT/INR 22/2.0  -Possible colonoscopy day  -GI consulting and following    ESRD:  -M-W-F schedule with Na thiosulfate for calciphylaxis  -renal chemistries good  -Lytes stable  -Strict I&O  -Per staff possible D/C follow up at HD outpatient center     Anemia:  -below goal  -getting DONNY's  -transfuse <7 or symtomatic    HypoNa  Mild  Will address with HD     Renal BMD:  -Ca corrects for low albumin; consult nutrition  -check phos; receiving Sodium Thiosulfate for Calciphylaxis    HTN:  -above goal  -continue home meds  Cardiology consulting     A-Fib with RVR:  -Cardiology following      Harinder Hatfield NP  Nephrology  [facility]

## 2020-10-04 NOTE — PT/OT/SLP PROGRESS
Physical Therapy      Patient Name:  Griselda Neely   MRN:  [id number]    Patient not seen today secondary to Patient unwilling to participate. Will follow-up 10/5/20.    Ivan Quijano, PT

## 2020-10-05 ENCOUNTER — DOCUMENT SCAN (OUTPATIENT)
Dept: HOME HEALTH SERVICES | Facility: HOSPITAL | Age: 75
End: 2020-10-05

## 2020-10-05 ENCOUNTER — TELEPHONE (OUTPATIENT)
Dept: FAMILY MEDICINE | Facility: CLINIC | Age: 75
End: 2020-10-05

## 2020-10-06 ENCOUNTER — TELEPHONE (OUTPATIENT)
Dept: CARDIOLOGY | Facility: CLINIC | Age: 75
End: 2020-10-06

## 2020-10-06 ENCOUNTER — OFFICE VISIT (OUTPATIENT)
Dept: SURGERY | Facility: CLINIC | Age: 75
End: 2020-10-06
Payer: MEDICARE

## 2020-10-06 VITALS
DIASTOLIC BLOOD PRESSURE: 64 MMHG | HEIGHT: 65 IN | TEMPERATURE: 97 F | BODY MASS INDEX: 19.66 KG/M2 | SYSTOLIC BLOOD PRESSURE: 139 MMHG | HEART RATE: 77 BPM | RESPIRATION RATE: 20 BRPM | WEIGHT: 118 LBS

## 2020-10-06 DIAGNOSIS — L97.922 CALCIPHYLAXIS OF LEFT LOWER EXTREMITY WITH NONHEALING ULCER WITH FAT LAYER EXPOSED: ICD-10-CM

## 2020-10-06 DIAGNOSIS — E83.59 CALCIPHYLAXIS OF LEFT LOWER EXTREMITY WITH NONHEALING ULCER WITH FAT LAYER EXPOSED: ICD-10-CM

## 2020-10-06 DIAGNOSIS — E83.59 CALCIPHYLAXIS: Primary | ICD-10-CM

## 2020-10-06 DIAGNOSIS — J96.21 ACUTE ON CHRONIC RESPIRATORY FAILURE WITH HYPOXIA: ICD-10-CM

## 2020-10-06 PROCEDURE — 99024 PR POST-OP FOLLOW-UP VISIT: ICD-10-PCS | Mod: S$GLB,,, | Performed by: SURGERY

## 2020-10-06 PROCEDURE — 99024 POSTOP FOLLOW-UP VISIT: CPT | Mod: S$GLB,,, | Performed by: SURGERY

## 2020-10-06 NOTE — TELEPHONE ENCOUNTER
----- Message from Alexandra Neely sent at 10/5/2020  3:21 PM CDT -----  Regarding: Hospital f/u  Pt need 2 week f/u scheduled. Please contact her sister, Elvi to schedule this [phone]

## 2020-10-06 NOTE — PROGRESS NOTES
Subjective:       Patient ID: Griselda Neely is a 75 y.o. female.    Chief Complaint: Other (FU DOS 7/13/2020 Leg wound debridement)      HPI:  Patient returns for wound check. She has been having home health treat wound. She has no new complaints. She is happy with the progress of wound healing. The pain is similar and related to the calciphylaxis.     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    both legs    CHF (congestive heart failure)     Encounter for blood transfusion 03/2016    Gout     Hemodialysis access, AV graft     Hypertension     Mitral valve regurgitation     Osteoarthritis     Pancreatitis     Peripheral vascular disease     Pneumonia 09/09/2017    Renal failure      Past Surgical History:   Procedure Laterality Date    ACHILLES TENDON SURGERY Right     ANGIOGRAPHY OF ARTERIOVENOUS SHUNT Right 1/7/2020    Procedure: Fistulogram with Possible Intervention;  Surgeon: Joseph Loyola MD;  Location: [facility] CATH/EP LAB;  Service: Cardiology;  Laterality: Right;    ANGIOGRAPHY OF LOWER EXTREMITY Left 3/18/2020    Procedure: Angiogram Extremity Unilateral;  Surgeon: Ali Khoobehi, MD;  Location: [facility] CATH/EP LAB;  Service: Cardiology;  Laterality: Left;    APPENDECTOMY      CARDIAC CATHETERIZATION  07/03/2017    CHOLECYSTECTOMY      COLONOSCOPY Left 10/4/2020    Procedure: COLONOSCOPY;  Surgeon: Jordan Kilpatrick MD;  Location: [facility] ENDO;  Service: Endoscopy;  Laterality: Left;    ESOPHAGOGASTRODUODENOSCOPY Left 8/17/2020    Procedure: EGD (ESOPHAGOGASTRODUODENOSCOPY);  Surgeon: Talat Trevizo MD;  Location: [facility] ENDO;  Service: Endoscopy;  Laterality: Left;    ESOPHAGOGASTRODUODENOSCOPY Left 10/2/2020    Procedure: EGD (ESOPHAGOGASTRODUODENOSCOPY);  Surgeon: Talat Trevizo MD;  Location: [facility];  Service: Endoscopy;  Laterality: Left;    heal surgery Right     HYSTERECTOMY      INSERTION OF STENT INTO PERIPHERAL  VESSEL N/A 1/7/2020    Procedure: INSERTION, STENT, VESSEL, PERIPHERAL;  Surgeon: Joseph Loyola MD;  Location: [facility] CATH/EP LAB;  Service: Cardiology;  Laterality: N/A;    MITRAL VALVE REPLACEMENT      PARATHYROIDECTOMY      PARATHYROIDECTOMY  07/13/2017    PERCUTANEOUS TRANSLUMINAL ANGIOPLASTY OF ARTERIOVENOUS FISTULA N/A 1/7/2020    Procedure: PTA, AV FISTULA;  Surgeon: Joseph Loyola MD;  Location: [facility] CATH/EP LAB;  Service: Cardiology;  Laterality: N/A;    PERITONEAL CATHETER INSERTION      RENAL BIOPSY      vocal cord nodule      WOUND DEBRIDEMENT Left 7/13/2020    Procedure: DEBRIDEMENT, WOUND;  Surgeon: Carlos Elam III, MD;  Location: [facility] OR;  Service: General;  Laterality: Left;     Review of patient's allergies indicates:  No Known Allergies  Medication List with Changes/Refills   Current Medications    ASPIRIN (ECOTRIN) 81 MG EC TABLET    Take 1 tablet (81 mg total) by mouth once daily.    CALCIUM ACETATE (PHOSLO) 667 MG CAPSULE    Take 667 mg by mouth once daily.     DILTIAZEM (CARDIZEM CD) 180 MG 24 HR CAPSULE    Take 180 mg by mouth once daily.    FOLIC ACID/VIT B COMPLEX AND C (JENNIFER-KODAK ORAL)    Take 1 tablet by mouth once daily.    ISOSORBIDE MONONITRATE (IMDUR) 30 MG 24 HR TABLET    Take 30 mg by mouth once daily.    LOSARTAN (COZAAR) 25 MG TABLET    Take 25 mg by mouth once daily.    MAGNESIUM OXIDE (MAG-OX) 400 MG (241.3 MG MAGNESIUM) TABLET    Take 1 tablet by mouth once daily    METOPROLOL SUCCINATE (TOPROL-XL) 25 MG 24 HR TABLET    Take 1 tablet (25 mg total) by mouth nightly.    ONDANSETRON (ZOFRAN-ODT) 4 MG TBDL    Take 4 mg by mouth every 6 (six) hours as needed.    OXYCODONE-ACETAMINOPHEN (PERCOCET) 5-325 MG PER TABLET    Take 1 tablet by mouth every 6 (six) hours as needed for Pain.    PANTOPRAZOLE (PROTONIX) 40 MG TABLET    Take 1 tablet (40 mg total) by mouth once daily.    TRAMADOL (ULTRAM) 50 MG TABLET    Take 50 mg by mouth every 8 (eight) hours as needed for  Pain.    WARFARIN (COUMADIN) 5 MG TABLET    Take 1 tablet (5 mg total) by mouth Daily.    WHEELCHAIR HARRISON    1 Device by Misc.(Non-Drug; Combo Route) route as needed (needed for transport).    ZINC SULFATE 220 MG TAB TABLET    Take 220 mg by mouth every other day.     Family History   Problem Relation Age of Onset    Heart disease Sister     Early death Sister         heart as baby    Heart disease Maternal Grandfather     Diabetes Mother     Hypertension Mother     Heart failure Mother     Heart disease Mother     Arthritis Mother     Diabetes Father     Early death Sister         infant     Social History     Socioeconomic History    Marital status:      Spouse name: Not on file    Number of children: Not on file    Years of education: Not on file    Highest education level: Not on file   Occupational History    Not on file   Social Needs    Financial resource strain: Not on file    Food insecurity     Worry: Not on file     Inability: Not on file    Transportation needs     Medical: Not on file     Non-medical: Not on file   Tobacco Use    Smoking status: Current Some Day Smoker     Packs/day: 0.50     Years: 45.00     Pack years: 22.50     Types: Cigarettes    Smokeless tobacco: Never Used   Substance and Sexual Activity    Alcohol use: No    Drug use: No    Sexual activity: Not on file   Lifestyle    Physical activity     Days per week: Not on file     Minutes per session: Not on file    Stress: Not on file   Relationships    Social connections     Talks on phone: Not on file     Gets together: Not on file     Attends Anabaptism service: Not on file     Active member of club or organization: Not on file     Attends meetings of clubs or organizations: Not on file     Relationship status: Not on file   Other Topics Concern    Not on file   Social History Narrative    Not on file         Review of Systems   Constitutional: Negative for appetite change, chills, fever and unexpected  weight change.   HENT: Negative for hearing loss, rhinorrhea, sore throat and voice change.    Eyes: Negative for photophobia and visual disturbance.   Respiratory: Negative for cough, choking and shortness of breath.    Cardiovascular: Negative for chest pain, palpitations and leg swelling.   Gastrointestinal: Negative for abdominal pain, blood in stool, constipation, diarrhea, nausea and vomiting.   Endocrine: Negative for cold intolerance, heat intolerance and polyphagia.   Genitourinary: Negative for dysuria.   Musculoskeletal: Negative for arthralgias and back pain.   Skin: Negative for color change.   Neurological: Negative for dizziness, seizures, syncope and headaches.   Hematological: Negative for adenopathy. Does not bruise/bleed easily.       Objective:      Physical Exam  Pulmonary:      Effort: Pulmonary effort is normal. No respiratory distress.   Skin:     Comments: Wound is more shallow. Now nearly flush with the skin. Wound bed has good granulation.    Neurological:      Mental Status: She is alert and oriented to person, place, and time.         Assessment/Plan:   Griselda was seen today for other.    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  It is slow to heal but this is somewhat expected.  No new recommendations.  Continue current care.

## 2020-10-07 ENCOUNTER — DOCUMENT SCAN (OUTPATIENT)
Dept: HOME HEALTH SERVICES | Facility: HOSPITAL | Age: 75
End: 2020-10-07

## 2020-10-09 ENCOUNTER — DOCUMENT SCAN (OUTPATIENT)
Dept: HOME HEALTH SERVICES | Facility: HOSPITAL | Age: 75
End: 2020-10-09
Payer: MEDICARE

## 2020-10-12 ENCOUNTER — TELEPHONE (OUTPATIENT)
Dept: CARDIOLOGY | Facility: CLINIC | Age: 75
End: 2020-10-12

## 2020-10-12 NOTE — TELEPHONE ENCOUNTER
DD from  called due to patient refusing to take 5mg Coumadin. Patient only wanted to take 2.5mg due to being afraid of bleeding. Patient INR 1.2 today. Per KP there is nothing we can do since patient is refusing to take 5mg.

## 2020-10-13 ENCOUNTER — DOCUMENT SCAN (OUTPATIENT)
Dept: HOME HEALTH SERVICES | Facility: HOSPITAL | Age: 75
End: 2020-10-13
Payer: MEDICARE

## 2020-10-15 ENCOUNTER — TELEPHONE (OUTPATIENT)
Dept: CARDIOLOGY | Facility: CLINIC | Age: 75
End: 2020-10-15

## 2020-10-15 ENCOUNTER — TELEPHONE (OUTPATIENT)
Dept: FAMILY MEDICINE | Facility: CLINIC | Age: 75
End: 2020-10-15

## 2020-10-15 NOTE — TELEPHONE ENCOUNTER
----- Message from Eliceo Jasmine sent at 10/13/2020 12:38 PM CDT -----  Regarding: hosp appt  Pt got out of the hosp last week needing hosp f.u appt   Pt sister janes [phone]

## 2020-10-15 NOTE — TELEPHONE ENCOUNTER
JUAN at [phone] [facility]  Called regarding patient INR she did not get our call on Monday. JUAN told patient she should be taking 5mg but she still refused. Patient sister (caregiver) said she was going to try to put 5mg tablets of Coumadin in her med container.

## 2020-10-16 ENCOUNTER — DOCUMENT SCAN (OUTPATIENT)
Dept: HOME HEALTH SERVICES | Facility: HOSPITAL | Age: 75
End: 2020-10-16
Payer: MEDICARE

## 2020-10-22 ENCOUNTER — EXTERNAL HOME HEALTH (OUTPATIENT)
Dept: HOME HEALTH SERVICES | Facility: HOSPITAL | Age: 75
End: 2020-10-22

## 2020-10-22 ENCOUNTER — OFFICE VISIT (OUTPATIENT)
Dept: CARDIOLOGY | Facility: CLINIC | Age: 75
End: 2020-10-22
Payer: MEDICARE

## 2020-10-22 VITALS
WEIGHT: 133 LBS | BODY MASS INDEX: 22.16 KG/M2 | HEART RATE: 65 BPM | OXYGEN SATURATION: 98 % | HEIGHT: 65 IN | SYSTOLIC BLOOD PRESSURE: 126 MMHG | RESPIRATION RATE: 16 BRPM | DIASTOLIC BLOOD PRESSURE: 70 MMHG

## 2020-10-22 DIAGNOSIS — Z95.2 H/O MITRAL VALVE REPLACEMENT: Chronic | ICD-10-CM

## 2020-10-22 DIAGNOSIS — I10 POORLY-CONTROLLED HYPERTENSION: ICD-10-CM

## 2020-10-22 DIAGNOSIS — I50.22 CHRONIC SYSTOLIC HEART FAILURE: Primary | ICD-10-CM

## 2020-10-22 DIAGNOSIS — E78.5 HYPERLIPIDEMIA, UNSPECIFIED HYPERLIPIDEMIA TYPE: Chronic | ICD-10-CM

## 2020-10-22 DIAGNOSIS — I10 ESSENTIAL HYPERTENSION: Chronic | ICD-10-CM

## 2020-10-22 DIAGNOSIS — Z66 DNR (DO NOT RESUSCITATE): Chronic | ICD-10-CM

## 2020-10-22 DIAGNOSIS — I48.11 LONGSTANDING PERSISTENT ATRIAL FIBRILLATION: ICD-10-CM

## 2020-10-22 DIAGNOSIS — I73.9 PVD (PERIPHERAL VASCULAR DISEASE): Chronic | ICD-10-CM

## 2020-10-22 PROCEDURE — 99214 OFFICE O/P EST MOD 30 MIN: CPT | Mod: S$GLB,,, | Performed by: INTERNAL MEDICINE

## 2020-10-22 PROCEDURE — 99499 RISK ADDL DX/OHS AUDIT: ICD-10-PCS | Mod: S$GLB,,, | Performed by: INTERNAL MEDICINE

## 2020-10-22 PROCEDURE — 99214 PR OFFICE/OUTPT VISIT, EST, LEVL IV, 30-39 MIN: ICD-10-PCS | Mod: S$GLB,,, | Performed by: INTERNAL MEDICINE

## 2020-10-22 PROCEDURE — 99499 UNLISTED E&M SERVICE: CPT | Mod: S$GLB,,, | Performed by: INTERNAL MEDICINE

## 2020-10-23 ENCOUNTER — TELEPHONE (OUTPATIENT)
Dept: CARDIOLOGY | Facility: CLINIC | Age: 75
End: 2020-10-23

## 2020-10-23 NOTE — TELEPHONE ENCOUNTER
----- Message from Liz Liu sent at 10/23/2020 10:05 AM CDT -----  Regarding: results  Pt  son dropped off labs    Labs were put in dr claire box for kavin Soni

## 2020-10-26 ENCOUNTER — HOSPITAL ENCOUNTER (INPATIENT)
Facility: HOSPITAL | Age: 75
LOS: 3 days | Discharge: HOME | DRG: 308 | End: 2020-10-29
Attending: EMERGENCY MEDICINE | Admitting: STUDENT IN AN ORGANIZED HEALTH CARE EDUCATION/TRAINING PROGRAM
Payer: MEDICARE

## 2020-10-26 DIAGNOSIS — J18.9 PNEUMONIA OF RIGHT LOWER LOBE DUE TO INFECTIOUS ORGANISM: ICD-10-CM

## 2020-10-26 DIAGNOSIS — I48.91 ATRIAL FIBRILLATION WITH RAPID VENTRICULAR RESPONSE: ICD-10-CM

## 2020-10-26 DIAGNOSIS — R07.9 CHEST PAIN: ICD-10-CM

## 2020-10-26 DIAGNOSIS — N18.6 ESRD (END STAGE RENAL DISEASE): Primary | Chronic | ICD-10-CM

## 2020-10-26 PROBLEM — K92.2 ACUTE UPPER GI BLEEDING: Status: RESOLVED | Noted: 2020-10-01 | Resolved: 2020-10-26

## 2020-10-26 PROBLEM — D62 ACUTE BLOOD LOSS ANEMIA: Status: RESOLVED | Noted: 2020-10-01 | Resolved: 2020-10-26

## 2020-10-26 LAB
ALBUMIN SERPL BCP-MCNC: 2.9 G/DL (ref 3.5–5.2)
ALP SERPL-CCNC: 124 U/L (ref 55–135)
ALT SERPL W/O P-5'-P-CCNC: 26 U/L (ref 10–44)
ANION GAP SERPL CALC-SCNC: 18 MMOL/L (ref 8–16)
AST SERPL-CCNC: 40 U/L (ref 10–40)
BASOPHILS # BLD AUTO: 0.03 K/UL (ref 0–0.2)
BASOPHILS NFR BLD: 0.2 % (ref 0–1.9)
BILIRUB SERPL-MCNC: 1.6 MG/DL (ref 0.1–1)
BNP SERPL-MCNC: >4500 PG/ML (ref 0–99)
BUN SERPL-MCNC: 36 MG/DL (ref 8–23)
CALCIUM SERPL-MCNC: 8.7 MG/DL (ref 8.7–10.5)
CHLORIDE SERPL-SCNC: 94 MMOL/L (ref 95–110)
CO2 SERPL-SCNC: 27 MMOL/L (ref 23–29)
CREAT SERPL-MCNC: 5 MG/DL (ref 0.5–1.4)
DIFFERENTIAL METHOD: ABNORMAL
EOSINOPHIL # BLD AUTO: 0 K/UL (ref 0–0.5)
EOSINOPHIL NFR BLD: 0 % (ref 0–8)
ERYTHROCYTE [DISTWIDTH] IN BLOOD BY AUTOMATED COUNT: 17.2 % (ref 11.5–14.5)
EST. GFR  (AFRICAN AMERICAN): 9.2 ML/MIN/1.73 M^2
EST. GFR  (NON AFRICAN AMERICAN): 7.9 ML/MIN/1.73 M^2
GLUCOSE SERPL-MCNC: 77 MG/DL (ref 70–110)
HCT VFR BLD AUTO: 31.8 % (ref 37–48.5)
HGB BLD-MCNC: 9.7 G/DL (ref 12–16)
IMM GRANULOCYTES # BLD AUTO: 0.08 K/UL (ref 0–0.04)
IMM GRANULOCYTES NFR BLD AUTO: 0.6 % (ref 0–0.5)
LACTATE SERPL-SCNC: 2.3 MMOL/L (ref 0.5–1.9)
LYMPHOCYTES # BLD AUTO: 0.6 K/UL (ref 1–4.8)
LYMPHOCYTES NFR BLD: 4.5 % (ref 18–48)
MAGNESIUM SERPL-MCNC: 1.8 MG/DL (ref 1.6–2.6)
MCH RBC QN AUTO: 27.9 PG (ref 27–31)
MCHC RBC AUTO-ENTMCNC: 30.5 G/DL (ref 32–36)
MCV RBC AUTO: 91 FL (ref 82–98)
MONOCYTES # BLD AUTO: 0.5 K/UL (ref 0.3–1)
MONOCYTES NFR BLD: 3.7 % (ref 4–15)
NEUTROPHILS # BLD AUTO: 11.9 K/UL (ref 1.8–7.7)
NEUTROPHILS NFR BLD: 91 % (ref 38–73)
NRBC BLD-RTO: 0 /100 WBC
PLATELET # BLD AUTO: 236 K/UL (ref 150–350)
PMV BLD AUTO: 10.5 FL (ref 9.2–12.9)
POTASSIUM SERPL-SCNC: 5.1 MMOL/L (ref 3.5–5.1)
PROCALCITONIN SERPL IA-MCNC: 18.83 NG/ML (ref 0–0.5)
PROT SERPL-MCNC: 7.1 G/DL (ref 6–8.4)
RBC # BLD AUTO: 3.48 M/UL (ref 4–5.4)
SARS-COV-2 RDRP RESP QL NAA+PROBE: NEGATIVE
SODIUM SERPL-SCNC: 139 MMOL/L (ref 136–145)
TROPONIN I SERPL DL<=0.01 NG/ML-MCNC: 0.07 NG/ML
WBC # BLD AUTO: 13.06 K/UL (ref 3.9–12.7)

## 2020-10-26 PROCEDURE — 99285 EMERGENCY DEPT VISIT HI MDM: CPT | Mod: 25

## 2020-10-26 PROCEDURE — 83880 ASSAY OF NATRIURETIC PEPTIDE: CPT

## 2020-10-26 PROCEDURE — 94761 N-INVAS EAR/PLS OXIMETRY MLT: CPT

## 2020-10-26 PROCEDURE — 63600175 PHARM REV CODE 636 W HCPCS: Performed by: EMERGENCY MEDICINE

## 2020-10-26 PROCEDURE — 36415 COLL VENOUS BLD VENIPUNCTURE: CPT

## 2020-10-26 PROCEDURE — 84145 PROCALCITONIN (PCT): CPT

## 2020-10-26 PROCEDURE — 80053 COMPREHEN METABOLIC PANEL: CPT

## 2020-10-26 PROCEDURE — 25000242 PHARM REV CODE 250 ALT 637 W/ HCPCS: Performed by: NURSE PRACTITIONER

## 2020-10-26 PROCEDURE — 21000000 HC CCU ICU ROOM CHARGE

## 2020-10-26 PROCEDURE — U0002 COVID-19 LAB TEST NON-CDC: HCPCS

## 2020-10-26 PROCEDURE — 96376 TX/PRO/DX INJ SAME DRUG ADON: CPT

## 2020-10-26 PROCEDURE — 83735 ASSAY OF MAGNESIUM: CPT

## 2020-10-26 PROCEDURE — 25000003 PHARM REV CODE 250: Performed by: NURSE PRACTITIONER

## 2020-10-26 PROCEDURE — 96375 TX/PRO/DX INJ NEW DRUG ADDON: CPT

## 2020-10-26 PROCEDURE — 87040 BLOOD CULTURE FOR BACTERIA: CPT | Mod: 59

## 2020-10-26 PROCEDURE — 63600175 PHARM REV CODE 636 W HCPCS: Performed by: NURSE PRACTITIONER

## 2020-10-26 PROCEDURE — 25000003 PHARM REV CODE 250: Performed by: EMERGENCY MEDICINE

## 2020-10-26 PROCEDURE — 83605 ASSAY OF LACTIC ACID: CPT

## 2020-10-26 PROCEDURE — 93010 ELECTROCARDIOGRAM REPORT: CPT | Mod: ,,, | Performed by: INTERNAL MEDICINE

## 2020-10-26 PROCEDURE — 96365 THER/PROPH/DIAG IV INF INIT: CPT

## 2020-10-26 PROCEDURE — 94640 AIRWAY INHALATION TREATMENT: CPT

## 2020-10-26 PROCEDURE — 93005 ELECTROCARDIOGRAM TRACING: CPT | Performed by: INTERNAL MEDICINE

## 2020-10-26 PROCEDURE — 99900035 HC TECH TIME PER 15 MIN (STAT)

## 2020-10-26 PROCEDURE — 84484 ASSAY OF TROPONIN QUANT: CPT

## 2020-10-26 PROCEDURE — 85025 COMPLETE CBC W/AUTO DIFF WBC: CPT

## 2020-10-26 PROCEDURE — 93010 EKG 12-LEAD: ICD-10-PCS | Mod: ,,, | Performed by: INTERNAL MEDICINE

## 2020-10-26 RX ORDER — ISOSORBIDE MONONITRATE 30 MG/1
30 TABLET, EXTENDED RELEASE ORAL DAILY
Status: DISCONTINUED | OUTPATIENT
Start: 2020-10-27 | End: 2020-10-29 | Stop reason: HOSPADM

## 2020-10-26 RX ORDER — DILTIAZEM HYDROCHLORIDE 5 MG/ML
10 INJECTION INTRAVENOUS
Status: COMPLETED | OUTPATIENT
Start: 2020-10-26 | End: 2020-10-26

## 2020-10-26 RX ORDER — DILTIAZEM HCL/D5W 125 MG/125
5 PLASTIC BAG, INJECTION (ML) INTRAVENOUS CONTINUOUS
Status: DISCONTINUED | OUTPATIENT
Start: 2020-10-26 | End: 2020-10-29 | Stop reason: HOSPADM

## 2020-10-26 RX ORDER — LANOLIN ALCOHOL/MO/W.PET/CERES
400 CREAM (GRAM) TOPICAL DAILY
Status: DISCONTINUED | OUTPATIENT
Start: 2020-10-27 | End: 2020-10-29 | Stop reason: HOSPADM

## 2020-10-26 RX ORDER — TALC
6 POWDER (GRAM) TOPICAL NIGHTLY PRN
Status: DISCONTINUED | OUTPATIENT
Start: 2020-10-26 | End: 2020-10-29 | Stop reason: HOSPADM

## 2020-10-26 RX ORDER — SODIUM CHLORIDE 0.9 % (FLUSH) 0.9 %
10 SYRINGE (ML) INJECTION
Status: DISCONTINUED | OUTPATIENT
Start: 2020-10-26 | End: 2020-10-29 | Stop reason: HOSPADM

## 2020-10-26 RX ORDER — PANTOPRAZOLE SODIUM 40 MG/1
40 TABLET, DELAYED RELEASE ORAL DAILY
Status: DISCONTINUED | OUTPATIENT
Start: 2020-10-27 | End: 2020-10-29 | Stop reason: HOSPADM

## 2020-10-26 RX ORDER — METOPROLOL SUCCINATE 25 MG/1
25 TABLET, EXTENDED RELEASE ORAL NIGHTLY
Status: DISCONTINUED | OUTPATIENT
Start: 2020-10-26 | End: 2020-10-29 | Stop reason: HOSPADM

## 2020-10-26 RX ORDER — POLYETHYLENE GLYCOL 3350 17 G/17G
17 POWDER, FOR SOLUTION ORAL 2 TIMES DAILY PRN
Status: DISCONTINUED | OUTPATIENT
Start: 2020-10-26 | End: 2020-10-29 | Stop reason: HOSPADM

## 2020-10-26 RX ORDER — LEVALBUTEROL 1.25 MG/.5ML
1.25 SOLUTION, CONCENTRATE RESPIRATORY (INHALATION)
Status: DISCONTINUED | OUTPATIENT
Start: 2020-10-26 | End: 2020-10-29 | Stop reason: HOSPADM

## 2020-10-26 RX ORDER — MAGNESIUM SULFATE 1 G/100ML
1 INJECTION INTRAVENOUS ONCE
Status: COMPLETED | OUTPATIENT
Start: 2020-10-26 | End: 2020-10-27

## 2020-10-26 RX ORDER — AMOXICILLIN 250 MG
1 CAPSULE ORAL 2 TIMES DAILY
Status: DISCONTINUED | OUTPATIENT
Start: 2020-10-26 | End: 2020-10-29 | Stop reason: HOSPADM

## 2020-10-26 RX ORDER — ACETAMINOPHEN 325 MG/1
650 TABLET ORAL
Status: COMPLETED | OUTPATIENT
Start: 2020-10-26 | End: 2020-10-26

## 2020-10-26 RX ORDER — VANCOMYCIN HCL IN 5 % DEXTROSE 1G/250ML
1000 PLASTIC BAG, INJECTION (ML) INTRAVENOUS ONCE
Status: COMPLETED | OUTPATIENT
Start: 2020-10-26 | End: 2020-10-26

## 2020-10-26 RX ORDER — ACETAMINOPHEN 325 MG/1
650 TABLET ORAL EVERY 4 HOURS PRN
Status: DISCONTINUED | OUTPATIENT
Start: 2020-10-26 | End: 2020-10-29 | Stop reason: HOSPADM

## 2020-10-26 RX ORDER — WARFARIN 2.5 MG/1
2.5 TABLET ORAL DAILY
Status: ON HOLD | COMMUNITY
End: 2020-11-10 | Stop reason: HOSPADM

## 2020-10-26 RX ORDER — ONDANSETRON 2 MG/ML
4 INJECTION INTRAMUSCULAR; INTRAVENOUS EVERY 8 HOURS PRN
Status: DISCONTINUED | OUTPATIENT
Start: 2020-10-26 | End: 2020-10-29 | Stop reason: HOSPADM

## 2020-10-26 RX ORDER — CALCIUM ACETATE 667 MG/1
667 CAPSULE ORAL DAILY
Status: DISCONTINUED | OUTPATIENT
Start: 2020-10-27 | End: 2020-10-29 | Stop reason: HOSPADM

## 2020-10-26 RX ADMIN — LEVALBUTEROL HYDROCHLORIDE 1.25 MG: 1.25 SOLUTION, CONCENTRATE RESPIRATORY (INHALATION) at 08:10

## 2020-10-26 RX ADMIN — DILTIAZEM HYDROCHLORIDE 10 MG: 5 INJECTION INTRAVENOUS at 05:10

## 2020-10-26 RX ADMIN — VANCOMYCIN HYDROCHLORIDE 1000 MG: 1 INJECTION, POWDER, LYOPHILIZED, FOR SOLUTION INTRAVENOUS at 10:10

## 2020-10-26 RX ADMIN — PIPERACILLIN SODIUM AND TAZOBACTAM SODIUM 3.38 G: 3; .375 INJECTION, POWDER, LYOPHILIZED, FOR SOLUTION INTRAVENOUS at 05:10

## 2020-10-26 RX ADMIN — DILTIAZEM HYDROCHLORIDE 5 MG/HR: 5 INJECTION INTRAVENOUS at 05:10

## 2020-10-26 RX ADMIN — ACETAMINOPHEN 650 MG: 325 TABLET ORAL at 04:10

## 2020-10-26 RX ADMIN — ONDANSETRON 4 MG: 2 INJECTION INTRAMUSCULAR; INTRAVENOUS at 09:10

## 2020-10-26 NOTE — ED NOTES
Pt presents to ed with son who reports pt was here yesterday and has returned due to fever. Was here yesterday for afib. Pt denies any pain. Son says she is not herself today. Call light in reach, will monitor.

## 2020-10-26 NOTE — ED NOTES
Attempted x 3 to establish iv access. Pt very combative hitting nurse. Able to obtain some labs but unable to maintain iv access. Will attempt again with another nurses assistance.

## 2020-10-26 NOTE — FIRST PROVIDER EVALUATION
Medical screening exam completed.  I have conducted a focused provider triage encounter, findings are as follows:    Brief history of present illness:  Chest pain with SOB )nset yesterday Completed dialysis today   There were no vitals filed for this visit.    Pertinent physical exam:  Pt has temp 101.5 HRR  Chest CTA      Brief workup plan:  Cardiac work up     Preliminary workup initiated; this workup will be continued and followed by the physician or advanced practice provider that is assigned to the patient when roomed.

## 2020-10-26 NOTE — ED PROVIDER NOTES
Encounter Date: 10/26/2020       History     Chief Complaint   Patient presents with    Altered Mental Status     Patient here with reported altered mental status per her son patient was seen in the emergency department last night for similar symptoms she was found to be in AFib with RVR she was not admitted at that time she is discharged for recommendations for dialysis this morning she was able to attend dialysis with removal of 3 L of fluid without significant complication but patient was persistently tachycardic patient's son reported back to the emergency department after she was seen by her home health nurse this morning she is being evaluated and treated for calcinosis cutis the area of her legs seem to be healing probably but she was concerned about the persistent tachycardic when she arrived emergency department patient was found to be febrile 101.6 son reports that she does get episodes of confusion when she gets ill no further history available secondary to patient's mental status    The history is provided by a relative. The history is limited by the condition of the patient.     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    both legs    CHF (congestive heart failure)     Encounter for blood transfusion 03/2016    Gout     Hemodialysis access, AV graft     Hypertension     Mitral valve regurgitation     Osteoarthritis     Pancreatitis     Peripheral vascular disease     Pneumonia 09/09/2017    Renal failure      Past Surgical History:   Procedure Laterality Date    ACHILLES TENDON SURGERY Right     ANGIOGRAPHY OF ARTERIOVENOUS SHUNT Right 1/7/2020    Procedure: Fistulogram with Possible Intervention;  Surgeon: Joseph Loyola MD;  Location: [facility] CATH/EP LAB;  Service: Cardiology;  Laterality: Right;    ANGIOGRAPHY OF LOWER EXTREMITY Left 3/18/2020    Procedure:  Angiogram Extremity Unilateral;  Surgeon: Ali Khoobehi, MD;  Location: [facility] CATH/EP LAB;  Service: Cardiology;  Laterality: Left;    APPENDECTOMY      CARDIAC CATHETERIZATION  07/03/2017    CHOLECYSTECTOMY      COLONOSCOPY Left 10/4/2020    Procedure: COLONOSCOPY;  Surgeon: Jordan Kilpatrick MD;  Location: [facility] ENDO;  Service: Endoscopy;  Laterality: Left;    ESOPHAGOGASTRODUODENOSCOPY Left 8/17/2020    Procedure: EGD (ESOPHAGOGASTRODUODENOSCOPY);  Surgeon: Talat Trevizo MD;  Location: [facility] ENDO;  Service: Endoscopy;  Laterality: Left;    ESOPHAGOGASTRODUODENOSCOPY Left 10/2/2020    Procedure: EGD (ESOPHAGOGASTRODUODENOSCOPY);  Surgeon: Talat Trevizo MD;  Location: [facility] ENDO;  Service: Endoscopy;  Laterality: Left;    heal surgery Right     HYSTERECTOMY      INSERTION OF STENT INTO PERIPHERAL VESSEL N/A 1/7/2020    Procedure: INSERTION, STENT, VESSEL, PERIPHERAL;  Surgeon: Joseph Loyola MD;  Location: [facility] CATH/EP LAB;  Service: Cardiology;  Laterality: N/A;    MITRAL VALVE REPLACEMENT      PARATHYROIDECTOMY      PARATHYROIDECTOMY  07/13/2017    PERCUTANEOUS TRANSLUMINAL ANGIOPLASTY OF ARTERIOVENOUS FISTULA N/A 1/7/2020    Procedure: PTA, AV FISTULA;  Surgeon: Joseph Loyola MD;  Location: [facility] CATH/EP LAB;  Service: Cardiology;  Laterality: N/A;    PERITONEAL CATHETER INSERTION      RENAL BIOPSY      vocal cord nodule      WOUND DEBRIDEMENT Left 7/13/2020    Procedure: DEBRIDEMENT, WOUND;  Surgeon: Carlos Elam III, MD;  Location: [facility] OR;  Service: General;  Laterality: Left;     Family History   Problem Relation Age of Onset    Heart disease Sister     Early death Sister         heart as baby    Heart disease Maternal Grandfather     Diabetes Mother     Hypertension Mother     Heart failure Mother     Heart disease Mother     Arthritis Mother     Diabetes Father     Early death Sister         infant     Social History     Tobacco Use    Smoking status: Current Some  Day Smoker     Packs/day: 0.50     Years: 45.00     Pack years: 22.50     Types: Cigarettes    Smokeless tobacco: Never Used   Substance Use Topics    Alcohol use: No    Drug use: No     Review of Systems   Unable to perform ROS: Mental status change       Physical Exam     Initial Vitals [10/26/20 1523]   BP Pulse Resp Temp SpO2   (!) 181/81 (!) 155 (!) 22 (!) 101.5 °F (38.6 °C) 98 %      MAP       --         Physical Exam    Constitutional: She appears well-developed. She appears lethargic. She appears ill. She appears distressed.   HENT:   Head: Normocephalic and atraumatic.   Right Ear: External ear normal.   Left Ear: External ear normal.   Mouth/Throat: Oropharynx is clear and moist.   Eyes: Conjunctivae and EOM are normal. Pupils are equal, round, and reactive to light.   Neck: Normal range of motion. Neck supple.   Cardiovascular: Normal heart sounds and intact distal pulses.   Irregularly irregular tachycardic   Pulmonary/Chest: She is in respiratory distress. She has rhonchi. She has rales.   Abdominal: Soft. Bowel sounds are normal. There is no abdominal tenderness.   Musculoskeletal: Normal range of motion. No tenderness or edema.   Neurological: She appears lethargic. GCS eye subscore is 4. GCS verbal subscore is 5. GCS motor subscore is 6.   Who is all 4 extremities   Skin: Skin is warm and dry. Capillary refill takes less than 2 seconds.   Psychiatric: She has a normal mood and affect. Her behavior is normal.         ED Course   Procedures  Labs Reviewed   CBC W/ AUTO DIFFERENTIAL - Abnormal; Notable for the following components:       Result Value    WBC 13.06 (*)     RBC 3.48 (*)     Hemoglobin 9.7 (*)     Hematocrit 31.8 (*)     Mean Corpuscular Hemoglobin Conc 30.5 (*)     RDW 17.2 (*)     Immature Granulocytes 0.6 (*)     Gran # (ANC) 11.9 (*)     Immature Grans (Abs) 0.08 (*)     Lymph # 0.6 (*)     Gran% 91.0 (*)     Lymph% 4.5 (*)     Mono% 3.7 (*)     All other components within normal  limits   COMPREHENSIVE METABOLIC PANEL - Abnormal; Notable for the following components:    Chloride 94 (*)     BUN, Bld 36 (*)     Creatinine 5.0 (*)     Albumin 2.9 (*)     Total Bilirubin 1.6 (*)     Anion Gap 18 (*)     eGFR if  9.2 (*)     eGFR if non  7.9 (*)     All other components within normal limits   TROPONIN I - Abnormal; Notable for the following components:    Troponin I 0.072 (*)     All other components within normal limits   B-TYPE NATRIURETIC PEPTIDE - Abnormal; Notable for the following components:    BNP >4,500 (*)     All other components within normal limits   LACTIC ACID, PLASMA - Abnormal; Notable for the following components:    Lactate (Lactic Acid) 2.3 (*)     All other components within normal limits    Narrative:     LA critical result(s) repeated. Called and verbal readback obtained   from Adeel Watson RN/ED by [facility] 10/26/2020 17:28   CULTURE, BLOOD   CULTURE, BLOOD   SARS-COV-2 RNA AMPLIFICATION, QUAL   MAGNESIUM        ECG Results          EKG 12-lead (In process)  Result time 10/26/20 15:58:53    In process by Interface, Lab In [facility] (10/26/20 15:58:53)                 Narrative:    Test Reason : R07.9,    Vent. Rate : 149 BPM     Atrial Rate : 166 BPM     P-R Int : 000 ms          QRS Dur : 086 ms      QT Int : 298 ms       P-R-T Axes : 000 -26 096 degrees     QTc Int : 469 ms    Atrial fibrillation with rapid ventricular response  Abnormal QRS-T angle, consider primary T wave abnormality  Abnormal ECG  When compared with ECG of 25-OCT-2020 11:52,  No significant change was found    Referred By: [email or web address]   SELF           Confirmed By:                             Imaging Results          X-Ray Chest AP Portable (Final result)  Result time 10/26/20 16:16:26    Final result by Brian Eugene MD (10/26/20 16:16:26)                 Narrative:    HISTORY: Chest pain, atrial fibrillation.    FINDINGS: Portable chest radiograph at 1618 hours compared to  multiple  prior exams including 08/25/2020 shows changes of prior valvuloplasty,  with stable moderately enlarged cardiac silhouette. The pulmonary  vasculature is mildly prominent centrally, stable, with aortic  vascular calcifications.    The lungs are symmetrically expanded, with scattered reticulonodular  densities in both lungs, and patchy right lung airspace opacities  including new nodular opacities in the right upper lobe laterally.  There is minor blunting the costophrenic angles, with no pneumothorax.  The bones are diffusely osteopenic.    IMPRESSION:  1. New patchy right lung airspace opacities, nonspecific, potentially  atelectasis and/or multifocal pneumonia, in the appropriate clinical  setting. Radiographic follow-up is recommended.  2. Stable moderate cardiomegaly and diffuse interstitial prominence.    Electronically Signed by Brian CARCAMO on 10/26/2020 4:19 PM                               Medical Decision Making:   ED Management:  Patient's radiographs show evidence of new pneumonia is not present wound last night radiographs she is on Cardizem drip she was given 2 boluses of Cardizem in the emergency department she also received Zosyn 3.375 g IV I spoke to hospitalist to evaluate patient emergency department for admission                             Clinical Impression:       ICD-10-CM ICD-9-CM   1. Chest pain  R07.9 786.50   2. Atrial fibrillation with rapid ventricular response  I48.91 427.31                          ED Disposition Condition    Admit                             Gilbert Gutierrez MD  10/26/20 [id number]

## 2020-10-26 NOTE — ED TRIAGE NOTES
"Per her son the pt has not been herself since yesterday. States that " her a-fib is acting up." The son states she was treated for the same symptoms yesterday and released home.   "

## 2020-10-27 LAB
ANION GAP SERPL CALC-SCNC: 17 MMOL/L (ref 8–16)
BUN SERPL-MCNC: 45 MG/DL (ref 8–23)
CALCIUM SERPL-MCNC: 8.1 MG/DL (ref 8.7–10.5)
CHLORIDE SERPL-SCNC: 93 MMOL/L (ref 95–110)
CO2 SERPL-SCNC: 26 MMOL/L (ref 23–29)
CREAT SERPL-MCNC: 5.8 MG/DL (ref 0.5–1.4)
EST. GFR  (AFRICAN AMERICAN): 7.7 ML/MIN/1.73 M^2
EST. GFR  (NON AFRICAN AMERICAN): 6.6 ML/MIN/1.73 M^2
GLUCOSE SERPL-MCNC: 76 MG/DL (ref 70–110)
INR PPP: 4.5
INR PPP: 4.7
LACTATE SERPL-SCNC: 1.6 MMOL/L (ref 0.5–1.9)
MAGNESIUM SERPL-MCNC: 2.1 MG/DL (ref 1.6–2.6)
PHOSPHATE SERPL-MCNC: 5.7 MG/DL (ref 2.7–4.5)
POTASSIUM SERPL-SCNC: 4.8 MMOL/L (ref 3.5–5.1)
PROTHROMBIN TIME: 41.4 SEC (ref 10.6–14.8)
PROTHROMBIN TIME: 42.3 SEC (ref 10.6–14.8)
SODIUM SERPL-SCNC: 136 MMOL/L (ref 136–145)
TROPONIN I SERPL DL<=0.01 NG/ML-MCNC: 0.06 NG/ML
VANCOMYCIN SERPL-MCNC: 11.9 UG/ML

## 2020-10-27 PROCEDURE — 36415 COLL VENOUS BLD VENIPUNCTURE: CPT

## 2020-10-27 PROCEDURE — 84100 ASSAY OF PHOSPHORUS: CPT

## 2020-10-27 PROCEDURE — 94640 AIRWAY INHALATION TREATMENT: CPT

## 2020-10-27 PROCEDURE — 83605 ASSAY OF LACTIC ACID: CPT

## 2020-10-27 PROCEDURE — 63600175 PHARM REV CODE 636 W HCPCS: Performed by: NURSE PRACTITIONER

## 2020-10-27 PROCEDURE — 99900035 HC TECH TIME PER 15 MIN (STAT)

## 2020-10-27 PROCEDURE — 85610 PROTHROMBIN TIME: CPT | Mod: 91

## 2020-10-27 PROCEDURE — 84484 ASSAY OF TROPONIN QUANT: CPT

## 2020-10-27 PROCEDURE — 27000221 HC OXYGEN, UP TO 24 HOURS

## 2020-10-27 PROCEDURE — 80202 ASSAY OF VANCOMYCIN: CPT

## 2020-10-27 PROCEDURE — 80048 BASIC METABOLIC PNL TOTAL CA: CPT

## 2020-10-27 PROCEDURE — 25000003 PHARM REV CODE 250: Performed by: NURSE PRACTITIONER

## 2020-10-27 PROCEDURE — 83735 ASSAY OF MAGNESIUM: CPT

## 2020-10-27 PROCEDURE — 99900031 HC PATIENT EDUCATION (STAT)

## 2020-10-27 PROCEDURE — 94761 N-INVAS EAR/PLS OXIMETRY MLT: CPT

## 2020-10-27 PROCEDURE — 85610 PROTHROMBIN TIME: CPT

## 2020-10-27 PROCEDURE — 21000000 HC CCU ICU ROOM CHARGE

## 2020-10-27 PROCEDURE — 63600175 PHARM REV CODE 636 W HCPCS: Performed by: INTERNAL MEDICINE

## 2020-10-27 PROCEDURE — 25000242 PHARM REV CODE 250 ALT 637 W/ HCPCS: Performed by: NURSE PRACTITIONER

## 2020-10-27 RX ORDER — VANCOMYCIN HCL IN 5 % DEXTROSE 1G/250ML
1000 PLASTIC BAG, INJECTION (ML) INTRAVENOUS ONCE
Status: COMPLETED | OUTPATIENT
Start: 2020-10-27 | End: 2020-10-28

## 2020-10-27 RX ADMIN — LEVALBUTEROL HYDROCHLORIDE 1.25 MG: 1.25 SOLUTION, CONCENTRATE RESPIRATORY (INHALATION) at 08:10

## 2020-10-27 RX ADMIN — MAGNESIUM SULFATE 1 G: 1 INJECTION INTRAVENOUS at 01:10

## 2020-10-27 RX ADMIN — VANCOMYCIN HYDROCHLORIDE 1000 MG: 1 INJECTION, POWDER, LYOPHILIZED, FOR SOLUTION INTRAVENOUS at 11:10

## 2020-10-27 RX ADMIN — METOPROLOL SUCCINATE 25 MG: 25 TABLET, FILM COATED, EXTENDED RELEASE ORAL at 08:10

## 2020-10-27 RX ADMIN — PANTOPRAZOLE SODIUM 40 MG: 40 TABLET, DELAYED RELEASE ORAL at 08:10

## 2020-10-27 RX ADMIN — LEVALBUTEROL HYDROCHLORIDE 1.25 MG: 1.25 SOLUTION, CONCENTRATE RESPIRATORY (INHALATION) at 02:10

## 2020-10-27 RX ADMIN — MAGNESIUM OXIDE 400 MG: 400 TABLET ORAL at 08:10

## 2020-10-27 RX ADMIN — MELATONIN 6 MG: at 08:10

## 2020-10-27 RX ADMIN — PIPERACILLIN AND TAZOBACTAM 3.38 G: 3; .375 INJECTION, POWDER, FOR SOLUTION INTRAVENOUS at 05:10

## 2020-10-27 RX ADMIN — DILTIAZEM HYDROCHLORIDE 5 MG/HR: 5 INJECTION INTRAVENOUS at 03:10

## 2020-10-27 RX ADMIN — CALCIUM ACETATE 667 MG: 667 CAPSULE ORAL at 08:10

## 2020-10-27 NOTE — PLAN OF CARE
Problem: Adult Inpatient Plan of Care  Goal: Plan of Care Review  10/27/2020 1843 by Cheryl Gann RN  Outcome: Ongoing, Progressing  10/27/2020 1842 by Cheryl Gann RN  Outcome: Ongoing, Progressing  Goal: Patient-Specific Goal (Individualization)  10/27/2020 1843 by Cheryl Gann RN  Outcome: Ongoing, Progressing  10/27/2020 1842 by Cheryl Gann RN  Outcome: Ongoing, Progressing  Goal: Absence of Hospital-Acquired Illness or Injury  10/27/2020 1843 by Cheryl Gann RN  Outcome: Ongoing, Progressing  10/27/2020 1842 by Cheryl Gann RN  Outcome: Ongoing, Progressing  Goal: Optimal Comfort and Wellbeing  10/27/2020 1843 by Cheryl Gann RN  Outcome: Ongoing, Progressing  10/27/2020 1842 by Cheryl Gann RN  Outcome: Ongoing, Progressing  Goal: Readiness for Transition of Care  10/27/2020 1843 by Cheryl Gann RN  Outcome: Ongoing, Progressing  10/27/2020 1842 by Cheryl Gann RN  Outcome: Ongoing, Progressing  Goal: Rounds/Family Conference  10/27/2020 1843 by Cheryl Gann RN  Outcome: Ongoing, Progressing  10/27/2020 1842 by Cheryl Gann RN  Outcome: Ongoing, Progressing     Problem: Adjustment to Illness (Sepsis/Septic Shock)  Goal: Optimal Coping  10/27/2020 1843 by Cheryl Gann RN  Outcome: Ongoing, Progressing  10/27/2020 1842 by Cheryl Gann RN  Outcome: Ongoing, Progressing     Problem: Bleeding (Sepsis/Septic Shock)  Goal: Absence of Bleeding  10/27/2020 1843 by Cheryl Gann RN  Outcome: Ongoing, Progressing  10/27/2020 1842 by Cheryl Gann RN  Outcome: Ongoing, Progressing     Problem: Glycemic Control Impaired (Sepsis/Septic Shock)  Goal: Blood Glucose Level Within Desired Range  10/27/2020 1843 by Cheryl Gann RN  Outcome: Ongoing, Progressing  10/27/2020 1842 by Cheryl Gann RN  Outcome: Ongoing, Progressing     Problem: Hemodynamic Instability (Sepsis/Septic Shock)  Goal: Effective Tissue Perfusion  10/27/2020 1843 by Cheryl Gann RN  Outcome: Ongoing,  Progressing  10/27/2020 1842 by Cheryl Gann RN  Outcome: Ongoing, Progressing     Problem: Infection (Sepsis/Septic Shock)  Goal: Absence of Infection Signs/Symptoms  10/27/2020 1843 by Cheryl Gann RN  Outcome: Ongoing, Progressing  10/27/2020 1842 by Cheryl Gann RN  Outcome: Ongoing, Progressing     Problem: Nutrition Impaired (Sepsis/Septic Shock)  Goal: Optimal Nutrition Intake  10/27/2020 1843 by Cheryl Gann RN  Outcome: Ongoing, Progressing  10/27/2020 1842 by Cheryl Gann RN  Outcome: Ongoing, Progressing     Problem: Respiratory Compromise (Sepsis/Septic Shock)  Goal: Effective Oxygenation and Ventilation  10/27/2020 1843 by Cheryl Gann RN  Outcome: Ongoing, Progressing  10/27/2020 1842 by Cheryl Gann RN  Outcome: Ongoing, Progressing     Problem: Fluid Imbalance (Pneumonia)  Goal: Fluid Balance  10/27/2020 1843 by Cheryl Gann RN  Outcome: Ongoing, Progressing  10/27/2020 1842 by Cheryl Gann RN  Outcome: Ongoing, Progressing     Problem: Infection (Pneumonia)  Goal: Resolution of Infection Signs/Symptoms  10/27/2020 1843 by Cheryl Gann RN  Outcome: Ongoing, Progressing  10/27/2020 1842 by Cheryl Gann RN  Outcome: Ongoing, Progressing     Problem: Respiratory Compromise (Pneumonia)  Goal: Effective Oxygenation and Ventilation  10/27/2020 1843 by Cheryl Gann RN  Outcome: Ongoing, Progressing  10/27/2020 1842 by Cheryl Gann RN  Outcome: Ongoing, Progressing     Problem: Wound  Goal: Optimal Wound Healing  10/27/2020 1843 by Cheryl Gann RN  Outcome: Ongoing, Progressing  10/27/2020 1842 by Cheryl Gann RN  Outcome: Ongoing, Progressing     Problem: Skin Injury Risk Increased  Goal: Skin Health and Integrity  10/27/2020 1843 by Cheryl Gann RN  Outcome: Ongoing, Progressing  10/27/2020 1842 by Cheryl Gann RN  Outcome: Ongoing, Progressing     Problem: Fall Injury Risk  Goal: Absence of Fall and Fall-Related Injury  10/27/2020 1843 by Cheryl Gann  RN  Outcome: Ongoing, Progressing  10/27/2020 1842 by Cheryl Gann RN  Outcome: Ongoing, Progressing     Problem: Arrhythmia/Dysrhythmia  Goal: Normalized Cardiac Rhythm  10/27/2020 1843 by Cheryl Gann RN  Outcome: Ongoing, Progressing  10/27/2020 1842 by Cheryl Gann RN  Outcome: Ongoing, Progressing     Problem: Device-Related Complication Risk (Hemodialysis)  Goal: Safe, Effective Therapy Delivery  10/27/2020 1843 by Cheryl Gann RN  Outcome: Ongoing, Progressing  10/27/2020 1842 by Cheryl Gann RN  Outcome: Ongoing, Progressing     Problem: Hemodynamic Instability (Hemodialysis)  Goal: Vital Signs Remain in Desired Range  10/27/2020 1843 by Cheryl Gann RN  Outcome: Ongoing, Progressing  10/27/2020 1842 by Cheryl Gann RN  Outcome: Ongoing, Progressing     Problem: Infection (Hemodialysis)  Goal: Absence of Infection Signs/Symptoms  10/27/2020 1843 by Cheryl Gann RN  Outcome: Ongoing, Progressing  10/27/2020 1842 by Cheryl Gann RN  Outcome: Ongoing, Progressing

## 2020-10-27 NOTE — PLAN OF CARE
Problem: Adult Inpatient Plan of Care  Goal: Plan of Care Review  Outcome: Ongoing, Progressing  Goal: Patient-Specific Goal (Individualization)  Outcome: Ongoing, Progressing  Goal: Absence of Hospital-Acquired Illness or Injury  Outcome: Ongoing, Progressing  Goal: Optimal Comfort and Wellbeing  Outcome: Ongoing, Progressing  Goal: Readiness for Transition of Care  Outcome: Ongoing, Progressing  Goal: Rounds/Family Conference  Outcome: Ongoing, Progressing     Problem: Adjustment to Illness (Sepsis/Septic Shock)  Goal: Optimal Coping  Outcome: Ongoing, Progressing     Problem: Bleeding (Sepsis/Septic Shock)  Goal: Absence of Bleeding  Outcome: Ongoing, Progressing     Problem: Glycemic Control Impaired (Sepsis/Septic Shock)  Goal: Blood Glucose Level Within Desired Range  Outcome: Ongoing, Progressing     Problem: Hemodynamic Instability (Sepsis/Septic Shock)  Goal: Effective Tissue Perfusion  Outcome: Ongoing, Progressing     Problem: Infection (Sepsis/Septic Shock)  Goal: Absence of Infection Signs/Symptoms  Outcome: Ongoing, Progressing     Problem: Nutrition Impaired (Sepsis/Septic Shock)  Goal: Optimal Nutrition Intake  Outcome: Ongoing, Progressing     Problem: Respiratory Compromise (Sepsis/Septic Shock)  Goal: Effective Oxygenation and Ventilation  Outcome: Ongoing, Progressing     Problem: Fluid Imbalance (Pneumonia)  Goal: Fluid Balance  Outcome: Ongoing, Progressing     Problem: Infection (Pneumonia)  Goal: Resolution of Infection Signs/Symptoms  Outcome: Ongoing, Progressing     Problem: Respiratory Compromise (Pneumonia)  Goal: Effective Oxygenation and Ventilation  Outcome: Ongoing, Progressing     Problem: Wound  Goal: Optimal Wound Healing  Outcome: Ongoing, Progressing     Problem: Skin Injury Risk Increased  Goal: Skin Health and Integrity  Outcome: Ongoing, Progressing     Problem: Fall Injury Risk  Goal: Absence of Fall and Fall-Related Injury  Outcome: Ongoing, Progressing     Problem:  Arrhythmia/Dysrhythmia  Goal: Normalized Cardiac Rhythm  Outcome: Ongoing, Progressing

## 2020-10-27 NOTE — SUBJECTIVE & OBJECTIVE
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    both legs    CHF (congestive heart failure)     Encounter for blood transfusion 03/2016    Gout     Hemodialysis access, AV graft     Hypertension     Mitral valve regurgitation     Osteoarthritis     Pancreatitis     Peripheral vascular disease     Pneumonia 09/09/2017    Renal failure        Past Surgical History:   Procedure Laterality Date    ACHILLES TENDON SURGERY Right     ANGIOGRAPHY OF ARTERIOVENOUS SHUNT Right 1/7/2020    Procedure: Fistulogram with Possible Intervention;  Surgeon: Joseph Loyola MD;  Location: [facility] CATH/EP LAB;  Service: Cardiology;  Laterality: Right;    ANGIOGRAPHY OF LOWER EXTREMITY Left 3/18/2020    Procedure: Angiogram Extremity Unilateral;  Surgeon: Ali Khoobehi, MD;  Location: [facility] CATH/EP LAB;  Service: Cardiology;  Laterality: Left;    APPENDECTOMY      CARDIAC CATHETERIZATION  07/03/2017    CHOLECYSTECTOMY      COLONOSCOPY Left 10/4/2020    Procedure: COLONOSCOPY;  Surgeon: Jordan Kilpatrick MD;  Location: [facility] ENDO;  Service: Endoscopy;  Laterality: Left;    ESOPHAGOGASTRODUODENOSCOPY Left 8/17/2020    Procedure: EGD (ESOPHAGOGASTRODUODENOSCOPY);  Surgeon: Talat Trevizo MD;  Location: [facility] ENDO;  Service: Endoscopy;  Laterality: Left;    ESOPHAGOGASTRODUODENOSCOPY Left 10/2/2020    Procedure: EGD (ESOPHAGOGASTRODUODENOSCOPY);  Surgeon: Talat Trevizo MD;  Location: [facility] ENDO;  Service: Endoscopy;  Laterality: Left;    heal surgery Right     HYSTERECTOMY      INSERTION OF STENT INTO PERIPHERAL VESSEL N/A 1/7/2020    Procedure: INSERTION, STENT, VESSEL, PERIPHERAL;  Surgeon: Joseph Loyola MD;  Location: [facility] CATH/EP LAB;  Service: Cardiology;  Laterality: N/A;    MITRAL VALVE REPLACEMENT      PARATHYROIDECTOMY      PARATHYROIDECTOMY  07/13/2017    PERCUTANEOUS TRANSLUMINAL ANGIOPLASTY OF ARTERIOVENOUS FISTULA N/A 1/7/2020    Procedure:  PTA, AV FISTULA;  Surgeon: Joseph Loyola MD;  Location: [facility] CATH/EP LAB;  Service: Cardiology;  Laterality: N/A;    PERITONEAL CATHETER INSERTION      RENAL BIOPSY      vocal cord nodule      WOUND DEBRIDEMENT Left 7/13/2020    Procedure: DEBRIDEMENT, WOUND;  Surgeon: Carlos Elam III, MD;  Location: [facility] OR;  Service: General;  Laterality: Left;       Review of patient's allergies indicates:  No Known Allergies    No current facility-administered medications on file prior to encounter.      Current Outpatient Medications on File Prior to Encounter   Medication Sig    aspirin (ECOTRIN) 81 MG EC tablet Take 1 tablet (81 mg total) by mouth once daily.    calcium acetate (PHOSLO) 667 mg capsule Take 667 mg by mouth once daily.     diltiaZEM (CARDIZEM CD) 180 MG 24 hr capsule Take 180 mg by mouth once daily.    folic acid/vit B complex and C (JENNIFER-KODAK ORAL) Take 1 tablet by mouth once daily.    isosorbide mononitrate (IMDUR) 30 MG 24 hr tablet Take 30 mg by mouth once daily.    losartan (COZAAR) 25 MG tablet Take 25 mg by mouth once daily.    magnesium oxide (MAG-OX) 400 mg (241.3 mg magnesium) tablet Take 1 tablet by mouth once daily (Patient taking differently: Take 400 mg by mouth once daily. )    metoprolol succinate (TOPROL-XL) 25 MG 24 hr tablet Take 1 tablet (25 mg total) by mouth nightly.    ondansetron (ZOFRAN-ODT) 4 MG TbDL Take 4 mg by mouth every 6 (six) hours as needed.    oxyCODONE-acetaminophen (PERCOCET) 5-325 mg per tablet Take 1 tablet by mouth every 6 (six) hours as needed for Pain.    pantoprazole (PROTONIX) 40 MG tablet Take 1 tablet (40 mg total) by mouth once daily.    sevelamer carbonate (RENVELA) 800 mg Tab Take 1 tablet by mouth 3 (three) times daily with meals.     warfarin (COUMADIN) 2.5 MG tablet Take 2.5 mg by mouth once daily.    zinc sulfate 220 mg Tab tablet Take 220 mg by mouth every other day.    heparin sodium,porcine (HEPARIN, PORCINE,) 1,000 unit/mL  injection 1,000 Units.     wheelchair An 1 Device by Misc.(Non-Drug; Combo Route) route as needed (needed for transport).    [DISCONTINUED] ergocalciferol (ERGOCALCIFEROL) 50,000 unit Cap Take 1.25 mg by mouth every 7 days.     [DISCONTINUED] traMADoL (ULTRAM) 50 mg tablet Take 50 mg by mouth every 8 (eight) hours as needed for Pain.    [DISCONTINUED] warfarin (COUMADIN) 5 MG tablet Take 1 tablet (5 mg total) by mouth Daily. (Patient not taking: Reported on 10/6/2020)     Family History     Problem Relation (Age of Onset)    Arthritis Mother    Diabetes Mother, Father    Early death Sister, Sister    Heart disease Sister, Maternal Grandfather, Mother    Heart failure Mother    Hypertension Mother        Tobacco Use    Smoking status: Current Some Day Smoker     Packs/day: 0.50     Years: 45.00     Pack years: 22.50     Types: Cigarettes    Smokeless tobacco: Never Used   Substance and Sexual Activity    Alcohol use: No    Drug use: No    Sexual activity: Not on file     Review of Systems   Constitutional: Positive for chills and fever. Negative for activity change, appetite change, diaphoresis, fatigue and unexpected weight change.   HENT: Negative for congestion, ear pain, facial swelling, hearing loss, sore throat and trouble swallowing.    Eyes: Negative for pain and discharge.   Respiratory: Positive for cough and shortness of breath. Negative for chest tightness and wheezing.    Cardiovascular: Negative for chest pain, palpitations and leg swelling.   Gastrointestinal: Negative for abdominal pain, blood in stool, diarrhea, nausea and vomiting.   Endocrine: Negative for polydipsia, polyphagia and polyuria.   Genitourinary: Negative for dysuria, flank pain, frequency and urgency.   Musculoskeletal: Negative for arthralgias, back pain, joint swelling, neck pain and neck stiffness.   Skin: Negative for rash and wound.   Allergic/Immunologic: Negative for environmental allergies and immunocompromised  state.   Neurological: Negative for dizziness, seizures, syncope, speech difficulty, weakness, light-headedness, numbness and headaches.   Hematological: Negative for adenopathy.   Psychiatric/Behavioral: Negative for sleep disturbance and suicidal ideas. The patient is not nervous/anxious.    All other systems reviewed and are negative.    Objective:     Vital Signs (Most Recent):  Temp: (!) 101 °F (38.3 °C) (10/26/20 1621)  Pulse: (!) 128 (10/26/20 1921)  Resp: (!) 22 (10/26/20 1830)  BP: 115/62 (10/26/20 1921)  SpO2: 97 % (10/26/20 1921) Vital Signs (24h Range):  Temp:  [101 °F (38.3 °C)-101.5 °F (38.6 °C)] 101 °F (38.3 °C)  Pulse:  [] 128  Resp:  [16-23] 22  SpO2:  [97 %-100 %] 97 %  BP: (100-181)/(59-99) 115/62     Weight: 60.3 kg (133 lb)  Body mass index is 22.13 kg/m².    Physical Exam  Vitals signs and nursing note reviewed.   Constitutional:       Appearance: She is ill-appearing.   HENT:      Head: Normocephalic and atraumatic.      Nose: Nose normal.      Mouth/Throat:      Mouth: Mucous membranes are moist.      Pharynx: Oropharynx is clear.   Eyes:      Extraocular Movements: Extraocular movements intact.      Pupils: Pupils are equal, round, and reactive to light.   Neck:      Musculoskeletal: Normal range of motion and neck supple.   Cardiovascular:      Rate and Rhythm: Tachycardia present. Rhythm irregular.      Pulses: Normal pulses.      Heart sounds: Murmur present.   Pulmonary:      Effort: Pulmonary effort is normal.      Breath sounds: Rhonchi and rales present.      Comments: RLL coarse  Abdominal:      General: Bowel sounds are normal.      Palpations: Abdomen is soft.   Musculoskeletal: Normal range of motion.   Skin:     General: Skin is warm and dry.      Capillary Refill: Capillary refill takes less than 2 seconds.   Neurological:      General: No focal deficit present.      Mental Status: She is alert and oriented to person, place, and time.   Psychiatric:      Comments: Cursing,  physically abusive to staff           CRANIAL NERVES     CN III, IV, VI   Pupils are equal, round, and reactive to light.       Significant Labs:   CBC:   Recent Labs   Lab 10/25/20  1218 10/26/20  1559   WBC 14.15* 13.06*   HGB 10.1* 9.7*   HCT 33.5* 31.8*    236     CMP:   Recent Labs   Lab 10/25/20  1218 10/26/20  1559    139   K 5.3* 5.1   CL 97 94*   CO2 26 27   GLU 92 77   BUN 61* 36*   CREATININE 7.0* 5.0*   CALCIUM 8.0* 8.7   PROT 7.3 7.1   ALBUMIN 3.0* 2.9*   BILITOT 1.1* 1.6*   ALKPHOS 152* 124   AST 70* 40   ALT 35 26   ANIONGAP 18* 18*   EGFRNONAA 5.3* 7.9*     Cardiac Markers:   Recent Labs   Lab 10/26/20  1559   BNP >4,500*     Troponin:   Recent Labs   Lab 10/25/20  1218 10/26/20  1559   TROPONINI 0.068* 0.072*     TSH:   Recent Labs   Lab 09/21/20  0337   TSH 3.210       Significant Imaging: EKG: I have reviewed all pertinent results/findings within the past 24 hours and my personal findings are: afib RVR     X-ray Chest Ap Portable    Result Date: 10/26/2020  HISTORY: Chest pain, atrial fibrillation. FINDINGS: Portable chest radiograph at 1618 hours compared to multiple prior exams including 08/25/2020 shows changes of prior valvuloplasty, with stable moderately enlarged cardiac silhouette. The pulmonary vasculature is mildly prominent centrally, stable, with aortic vascular calcifications. The lungs are symmetrically expanded, with scattered reticulonodular densities in both lungs, and patchy right lung airspace opacities including new nodular opacities in the right upper lobe laterally. There is minor blunting the costophrenic angles, with no pneumothorax. The bones are diffusely osteopenic. IMPRESSION: 1. New patchy right lung airspace opacities, nonspecific, potentially atelectasis and/or multifocal pneumonia, in the appropriate clinical setting. Radiographic follow-up is recommended. 2. Stable moderate cardiomegaly and diffuse interstitial prominence. Electronically Signed by Brian  Valorie CARCAMO on 10/26/2020 4:19 PM    X-ray Chest Ap Portable    Result Date: 10/25/2020  Examination: AP portable chest. CLINICAL HISTORY: Shortness of breath. COMPARISON: 10/1/2020. FINDINGS: The heart is prominent in size with evidence of biventricular dominance. Patient has undergone previous aortic valve repair. Diffuse vascular prominence with with diffuse initial prominence and evidence of fluid tracking within the minor fissure. Small right-sided pleural effusion again redemonstrated of scarring the right costophrenic angle. Overlying telemetry leads identified in the field-of-view. Tracheal lumen appears midline without obstruction. The thoracic cage appears intact. IMPRESSION: Radiographic pattern compatible with interstitial pulmonary edema secondary to uncompensated congestive heart failure. There has been no further reduction as compared to previous. Electronically Signed by Alonso CARCAMO on 10/25/2020 12:52 PM

## 2020-10-27 NOTE — PLAN OF CARE
10/27/20 0851   Patient Assessment/Suction   Level of Consciousness (AVPU) alert   Respiratory Effort Normal;Unlabored   Expansion/Accessory Muscles/Retractions no use of accessory muscles   All Lung Fields Breath Sounds diminished   Rhythm/Pattern, Respiratory shortness of breath   Cough Frequency no cough   PRE-TX-O2   O2 Device (Oxygen Therapy) room air   SpO2 (!) 86 %   Pulse (!) 114   Resp (!) 22   Aerosol Therapy   $ Aerosol Therapy Charges Aerosol Treatment   Daily Review of Necessity (SVN) completed   Respiratory Treatment Status (SVN) given   Treatment Route (SVN) mask   Patient Position (SVN) sitting in chair   Post Treatment Assessment (SVN) breath sounds improved   Signs of Intolerance (SVN) none   Breath Sounds Post-Respiratory Treatment   Throughout All Fields Post-Treatment All Fields   Throughout All Fields Post-Treatment aeration increased   Post-treatment Heart Rate (beats/min) 114   Post-treatment Resp Rate (breaths/min) 26   Respiratory Evaluation   $ Care Plan Tech Time 15 min

## 2020-10-27 NOTE — HPI
"Griselda Neely is a 75 y.o.  female who has a past medical history of Anemia, Calciphylaxis (07/2017), CHF (congestive heart failure), Gout, Hypertension, Mitral valve regurgitation, Osteoarthritis, Pancreatitis, Peripheral vascular disease, Pneumonia (09/09/2017), afib on coumadin, and ESRD on HD MWF who presents today with complaints of altered mental status. It is severe. It is associated with cough with hemoptysis, SOB, fever up to 101, and weakness. She denies N/V/D, chest pain, dizziness, or LOC. She is reportedly hitting the staff. On my exam, she is not physically abusive, but states, "why are you asking me all this dumbass shit" when I was asking her orientation questions, but is oriented to person, place, and time. She is disoriented to situation. She was seen in the ED yesterday for afib RVR and volume overload. She was reportedly offered admission but opted to go home and go to dialysis today. She had 3L removed. She returned to the ED again in afib RVR and febrile. Pt was a DNI on previous admission. Discussed code status with patient and she maintains she would like to be a DNI.  "

## 2020-10-27 NOTE — H&P
"[facility] Medicine  History & Physical    DOS: 10/26/2020  6:40 PM    Patient Name: Griselda Neely  MRN: [id number]  Admission Date: 10/26/2020  Attending Physician: Dr. Chamorro  Primary Care Provider: Kalie Neely MD         Patient information was obtained from patient and ER records.     Subjective:     Principal Problem:Atrial fibrillation with rapid ventricular response    Chief Complaint:   Chief Complaint   Patient presents with    Altered Mental Status        HPI: Griselda Neely is a 75 y.o.  female who has a past medical history of Anemia, Calciphylaxis (07/2017), CHF (congestive heart failure), Gout, Hypertension, Mitral valve regurgitation, Osteoarthritis, Pancreatitis, Peripheral vascular disease, Pneumonia (09/09/2017), afib on coumadin, and ESRD on HD MWF who presents today with complaints of altered mental status. It is severe. It is associated with cough with hemoptysis, SOB, fever up to 101, and weakness. She denies N/V/D, chest pain, dizziness, or LOC. She is reportedly hitting the staff. On my exam, she is not physically abusive, but states, "why are you asking me all this dumbass shit" when I was asking her orientation questions, but is oriented to person, place, and time. She is disoriented to situation. She was seen in the ED yesterday for afib RVR and volume overload. She was reportedly offered admission but opted to go home and go to dialysis today. She had 3L removed. She returned to the ED again in afib RVR and febrile. Pt was a DNI on previous admission. Discussed code status with patient and she maintains she would like to be a DNI.    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    both legs    CHF (congestive heart failure)     Encounter for blood transfusion 03/2016    Gout     Hemodialysis access, AV graft     Hypertension     Mitral valve regurgitation     Osteoarthritis     " Pancreatitis     Peripheral vascular disease     Pneumonia 09/09/2017    Renal failure        Past Surgical History:   Procedure Laterality Date    ACHILLES TENDON SURGERY Right     ANGIOGRAPHY OF ARTERIOVENOUS SHUNT Right 1/7/2020    Procedure: Fistulogram with Possible Intervention;  Surgeon: Joseph Loyola MD;  Location: [facility] CATH/EP LAB;  Service: Cardiology;  Laterality: Right;    ANGIOGRAPHY OF LOWER EXTREMITY Left 3/18/2020    Procedure: Angiogram Extremity Unilateral;  Surgeon: Ali Khoobehi, MD;  Location: [facility] CATH/EP LAB;  Service: Cardiology;  Laterality: Left;    APPENDECTOMY      CARDIAC CATHETERIZATION  07/03/2017    CHOLECYSTECTOMY      COLONOSCOPY Left 10/4/2020    Procedure: COLONOSCOPY;  Surgeon: Jordan Kilpatrick MD;  Location: [facility] ENDO;  Service: Endoscopy;  Laterality: Left;    ESOPHAGOGASTRODUODENOSCOPY Left 8/17/2020    Procedure: EGD (ESOPHAGOGASTRODUODENOSCOPY);  Surgeon: Talat Trevizo MD;  Location: [facility] ENDO;  Service: Endoscopy;  Laterality: Left;    ESOPHAGOGASTRODUODENOSCOPY Left 10/2/2020    Procedure: EGD (ESOPHAGOGASTRODUODENOSCOPY);  Surgeon: Talat Trevizo MD;  Location: [facility] ENDO;  Service: Endoscopy;  Laterality: Left;    heal surgery Right     HYSTERECTOMY      INSERTION OF STENT INTO PERIPHERAL VESSEL N/A 1/7/2020    Procedure: INSERTION, STENT, VESSEL, PERIPHERAL;  Surgeon: Joseph Loyola MD;  Location: [facility] CATH/EP LAB;  Service: Cardiology;  Laterality: N/A;    MITRAL VALVE REPLACEMENT      PARATHYROIDECTOMY      PARATHYROIDECTOMY  07/13/2017    PERCUTANEOUS TRANSLUMINAL ANGIOPLASTY OF ARTERIOVENOUS FISTULA N/A 1/7/2020    Procedure: PTA, AV FISTULA;  Surgeon: Joseph Loyola MD;  Location: [facility] CATH/EP LAB;  Service: Cardiology;  Laterality: N/A;    PERITONEAL CATHETER INSERTION      RENAL BIOPSY      vocal cord nodule      WOUND DEBRIDEMENT Left 7/13/2020    Procedure: DEBRIDEMENT, WOUND;  Surgeon: Carlos Elam III, MD;  Location:  [facility] OR;  Service: General;  Laterality: Left;       Review of patient's allergies indicates:  No Known Allergies    No current facility-administered medications on file prior to encounter.      Current Outpatient Medications on File Prior to Encounter   Medication Sig    aspirin (ECOTRIN) 81 MG EC tablet Take 1 tablet (81 mg total) by mouth once daily.    calcium acetate (PHOSLO) 667 mg capsule Take 667 mg by mouth once daily.     diltiaZEM (CARDIZEM CD) 180 MG 24 hr capsule Take 180 mg by mouth once daily.    folic acid/vit B complex and C (JENNIFER-KODAK ORAL) Take 1 tablet by mouth once daily.    isosorbide mononitrate (IMDUR) 30 MG 24 hr tablet Take 30 mg by mouth once daily.    losartan (COZAAR) 25 MG tablet Take 25 mg by mouth once daily.    magnesium oxide (MAG-OX) 400 mg (241.3 mg magnesium) tablet Take 1 tablet by mouth once daily (Patient taking differently: Take 400 mg by mouth once daily. )    metoprolol succinate (TOPROL-XL) 25 MG 24 hr tablet Take 1 tablet (25 mg total) by mouth nightly.    ondansetron (ZOFRAN-ODT) 4 MG TbDL Take 4 mg by mouth every 6 (six) hours as needed.    oxyCODONE-acetaminophen (PERCOCET) 5-325 mg per tablet Take 1 tablet by mouth every 6 (six) hours as needed for Pain.    pantoprazole (PROTONIX) 40 MG tablet Take 1 tablet (40 mg total) by mouth once daily.    sevelamer carbonate (RENVELA) 800 mg Tab Take 1 tablet by mouth 3 (three) times daily with meals.     warfarin (COUMADIN) 2.5 MG tablet Take 2.5 mg by mouth once daily.    zinc sulfate 220 mg Tab tablet Take 220 mg by mouth every other day.    heparin sodium,porcine (HEPARIN, PORCINE,) 1,000 unit/mL injection 1,000 Units.     wheelchair An 1 Device by Misc.(Non-Drug; Combo Route) route as needed (needed for transport).    [DISCONTINUED] ergocalciferol (ERGOCALCIFEROL) 50,000 unit Cap Take 1.25 mg by mouth every 7 days.     [DISCONTINUED] traMADoL (ULTRAM) 50 mg tablet Take 50 mg by mouth every 8 (eight)  hours as needed for Pain.    [DISCONTINUED] warfarin (COUMADIN) 5 MG tablet Take 1 tablet (5 mg total) by mouth Daily. (Patient not taking: Reported on 10/6/2020)     Family History     Problem Relation (Age of Onset)    Arthritis Mother    Diabetes Mother, Father    Early death Sister, Sister    Heart disease Sister, Maternal Grandfather, Mother    Heart failure Mother    Hypertension Mother        Tobacco Use    Smoking status: Current Some Day Smoker     Packs/day: 0.50     Years: 45.00     Pack years: 22.50     Types: Cigarettes    Smokeless tobacco: Never Used   Substance and Sexual Activity    Alcohol use: No    Drug use: No    Sexual activity: Not on file     Review of Systems   Constitutional: Positive for chills and fever. Negative for activity change, appetite change, diaphoresis, fatigue and unexpected weight change.   HENT: Negative for congestion, ear pain, facial swelling, hearing loss, sore throat and trouble swallowing.    Eyes: Negative for pain and discharge.   Respiratory: Positive for cough and shortness of breath. Negative for chest tightness and wheezing.    Cardiovascular: Negative for chest pain, palpitations and leg swelling.   Gastrointestinal: Negative for abdominal pain, blood in stool, diarrhea, nausea and vomiting.   Endocrine: Negative for polydipsia, polyphagia and polyuria.   Genitourinary: Negative for dysuria, flank pain, frequency and urgency.   Musculoskeletal: Negative for arthralgias, back pain, joint swelling, neck pain and neck stiffness.   Skin: Negative for rash and wound.   Allergic/Immunologic: Negative for environmental allergies and immunocompromised state.   Neurological: Negative for dizziness, seizures, syncope, speech difficulty, weakness, light-headedness, numbness and headaches.   Hematological: Negative for adenopathy.   Psychiatric/Behavioral: Negative for sleep disturbance and suicidal ideas. The patient is not nervous/anxious.    All other systems  reviewed and are negative.    Objective:     Vital Signs (Most Recent):  Temp: (!) 101 °F (38.3 °C) (10/26/20 1621)  Pulse: (!) 128 (10/26/20 1921)  Resp: (!) 22 (10/26/20 1830)  BP: 115/62 (10/26/20 1921)  SpO2: 97 % (10/26/20 1921) Vital Signs (24h Range):  Temp:  [101 °F (38.3 °C)-101.5 °F (38.6 °C)] 101 °F (38.3 °C)  Pulse:  [] 128  Resp:  [16-23] 22  SpO2:  [97 %-100 %] 97 %  BP: (100-181)/(59-99) 115/62     Weight: 60.3 kg (133 lb)  Body mass index is 22.13 kg/m².    Physical Exam  Vitals signs and nursing note reviewed.   Constitutional:       Appearance: She is ill-appearing.   HENT:      Head: Normocephalic and atraumatic.      Nose: Nose normal.      Mouth/Throat:      Mouth: Mucous membranes are moist.      Pharynx: Oropharynx is clear.   Eyes:      Extraocular Movements: Extraocular movements intact.      Pupils: Pupils are equal, round, and reactive to light.   Neck:      Musculoskeletal: Normal range of motion and neck supple.   Cardiovascular:      Rate and Rhythm: Tachycardia present. Rhythm irregular.      Pulses: Normal pulses.      Heart sounds: Murmur present.   Pulmonary:      Effort: Pulmonary effort is normal.      Breath sounds: Rhonchi and rales present.      Comments: RLL coarse  Abdominal:      General: Bowel sounds are normal.      Palpations: Abdomen is soft.   Musculoskeletal: Normal range of motion.   Skin:     General: Skin is warm and dry.      Capillary Refill: Capillary refill takes less than 2 seconds.   Neurological:      General: No focal deficit present.      Mental Status: She is alert and oriented to person, place, and time.   Psychiatric:      Comments: Cursing, physically abusive to staff           CRANIAL NERVES     CN III, IV, VI   Pupils are equal, round, and reactive to light.       Significant Labs:   CBC:   Recent Labs   Lab 10/25/20  1218 10/26/20  1559   WBC 14.15* 13.06*   HGB 10.1* 9.7*   HCT 33.5* 31.8*    236     CMP:   Recent Labs   Lab  10/25/20  1218 10/26/20  1559    139   K 5.3* 5.1   CL 97 94*   CO2 26 27   GLU 92 77   BUN 61* 36*   CREATININE 7.0* 5.0*   CALCIUM 8.0* 8.7   PROT 7.3 7.1   ALBUMIN 3.0* 2.9*   BILITOT 1.1* 1.6*   ALKPHOS 152* 124   AST 70* 40   ALT 35 26   ANIONGAP 18* 18*   EGFRNONAA 5.3* 7.9*     Cardiac Markers:   Recent Labs   Lab 10/26/20  1559   BNP >4,500*     Troponin:   Recent Labs   Lab 10/25/20  1218 10/26/20  1559   TROPONINI 0.068* 0.072*     TSH:   Recent Labs   Lab 09/21/20  0337   TSH 3.210       Significant Imaging: EKG: I have reviewed all pertinent results/findings within the past 24 hours and my personal findings are: afib RVR     X-ray Chest Ap Portable    Result Date: 10/26/2020  HISTORY: Chest pain, atrial fibrillation. FINDINGS: Portable chest radiograph at 1618 hours compared to multiple prior exams including 08/25/2020 shows changes of prior valvuloplasty, with stable moderately enlarged cardiac silhouette. The pulmonary vasculature is mildly prominent centrally, stable, with aortic vascular calcifications. The lungs are symmetrically expanded, with scattered reticulonodular densities in both lungs, and patchy right lung airspace opacities including new nodular opacities in the right upper lobe laterally. There is minor blunting the costophrenic angles, with no pneumothorax. The bones are diffusely osteopenic. IMPRESSION: 1. New patchy right lung airspace opacities, nonspecific, potentially atelectasis and/or multifocal pneumonia, in the appropriate clinical setting. Radiographic follow-up is recommended. 2. Stable moderate cardiomegaly and diffuse interstitial prominence. Electronically Signed by Brian CARCAMO on 10/26/2020 4:19 PM    X-ray Chest Ap Portable    Result Date: 10/25/2020  Examination: AP portable chest. CLINICAL HISTORY: Shortness of breath. COMPARISON: 10/1/2020. FINDINGS: The heart is prominent in size with evidence of biventricular dominance. Patient has undergone previous  aortic valve repair. Diffuse vascular prominence with with diffuse initial prominence and evidence of fluid tracking within the minor fissure. Small right-sided pleural effusion again redemonstrated of scarring the right costophrenic angle. Overlying telemetry leads identified in the field-of-view. Tracheal lumen appears midline without obstruction. The thoracic cage appears intact. IMPRESSION: Radiographic pattern compatible with interstitial pulmonary edema secondary to uncompensated congestive heart failure. There has been no further reduction as compared to previous. Electronically Signed by Alonso CARCAMO on 10/25/2020 12:52 PM      Assessment/Plan:     Active Hospital Problems    Diagnosis    *Atrial fibrillation with rapid ventricular response    Right lower lobe pneumonia    Acute on chronic systolic heart failure    Encephalopathy chronic    Sepsis    Anemia due to chronic kidney disease     Likely of chronic disease.       ESRD (end stage renal disease) on HD M,W, F    HTN (hypertension)     PLAN:  Admit to Cardiology A  cardizem gtt  Blood cultures drawn in ED  Get sputum cultures with gram stain  Cover with zosyn and vanc given multiple recent admissions  Check procal  levalbuterol nebulizers  Consult nephrology for HD  Repeat lactic acid in 4 hours  Cannot give fluid bolus or maintenance fluids as patient is an HD patient and already overloaded. She is not in shock   Will hold losartan and add parameters to metoprolol and imdur  Holding PO cardizem while getting IV, resume when clinically indicated  INR was 4 yesterday, holding coumadin and repeat PT/INR daily, resume when clinically indicated  DNI  VTE Risk Mitigation (From admission, onward)         Ordered     IP VTE HIGH RISK PATIENT  Once      10/26/20 1935     Place sequential compression device  Until discontinued      10/26/20 1935                   Maggie Carpio NP  Department of Hospital Medicine   [facility]

## 2020-10-27 NOTE — PROGRESS NOTES
VANCOMYCIN PHARMACOKINETIC NOTE:  Vancomycin Day # 1    Objective/Assessment:    Diagnosis/Indication for Vancomycin: Pneumonia     75 y.o., female; Actual Body Weight = 60.3 kg (133 lb).    The patient has the following labs:  10/26/2020 Estimated Creatinine Clearance: 8.9 mL/min (A) (based on SCr of 5 mg/dL (H)). Lab Results   Component Value Date    BUN 36 (H) 10/26/2020     Lab Results   Component Value Date    WBC 13.06 (H) 10/26/2020        Plan:  Adjust vancomycin dose and/or frequency based on the patient's actual weight and renal function:  Initiate Vancomycin 1000 mg IV intermittent dosing.  Orders have been entered into patient's chart.Dialysis on MWF.    Random vancomycin level has been on 10/27/20 @ 20:30.    Pharmacy will manage vancomycin therapy, monitor serum vancomycin levels, monitor renal function and adjust regimen as necessary.    Thank you for allowing us to participate in this patient's care.     Nickolas Macdonald 10/26/2020 8:08 PM  Department of Pharmacy  Ext [phone]

## 2020-10-27 NOTE — PLAN OF CARE
CONNER informed Dr. Lea that pt is requesting a WC. [facility] bed: Scar Varner from [facility], pt got a bed in October of 2017 from [facility] and she informed them they would need to purchase a new one if desired.

## 2020-10-27 NOTE — PLAN OF CARE
Important Message from Medicare was sign, explained and given to patient/caregiver on 10/27/2020 at 2:47am     answered all questions.

## 2020-10-27 NOTE — PLAN OF CARE
D/c planning assessment completed at bedside with pt and son/POA Jovany Neely. Per record, She is , and Jovany is her only adult son. Her PCP is Dr. Kalie Neely, and she uses [facility].      Pt and son are requesting a new WC and hospital bed if possible. Jovany states they asked DME company about this and were told they would have to purchase a new hospital bed. Pt lives with her sister and has good family support. SW/CM to continue to follow.     10/27/20 1037   Discharge Assessment   Assessment Type Discharge Planning Assessment   Confirmed/corrected address and phone number on facesheet? Yes   Assessment information obtained from? Patient;Caregiver;Medical Record   Prior to hospitilization cognitive status: Alert/Oriented   Prior to hospitalization functional status: Needs Assistance;Assistive Equipment   Current cognitive status: Alert/Oriented   Current Functional Status: Needs Assistance;Assistive Equipment   Facility Arrived From: home   Lives With sibling(s)   Able to Return to Prior Arrangements yes   Is patient able to care for self after discharge? Unable to determine at this time (comments)   Who are your caregiver(s) and their phone number(s)? POA is Jovany Neely [phone]. OR planning contact is Aide Watson with whom pt lives [phone].   Readmission Within the Last 30 Days previous discharge plan unsuccessful   If yes, most recent facility name: [facility] 10/2/20-10/4/20   Patient currently being followed by outpatient case management? No   Patient currently receives any other outside agency services? Yes   Name and contact number of agency or person providing outside services Pt gets wound care (through [facility] ?) 2x/weekly   Is it the patient/care giver preference to resume care with the current outside agency? Yes   Equipment Currently Used at Home walker, rolling;hospital bed;3-in-1 commode;wound care supplies   Part D Coverage [employer]   Do you have any  problems affording any of your prescribed medications? No   Is the patient taking medications as prescribed? yes   Does the patient have transportation home? Yes   Transportation Anticipated family or friend will provide   Dialysis Name and Scheduled days [facility] [address] Fri 6:30 AM   Discharge Plan A Home Health   Discharge Plan B Home with family   DME Needed Upon Discharge  wheelchair   Patient/Family in Agreement with Plan yes   Readmission Questionnaire   At the time of your discharge, did someone talk to you about what your health problems were? Yes   At the time of discharge, did someone talk to you about what to watch out for regarding worsening of your health problem? Yes   At the time of discharge, did someone talk to you about what to do if you experienced worsening of your health problem? Yes   At the time of discharge, did someone talk to you about which medication to take when you left the hospital and which ones to stop taking? Yes   At the time of discharge, did someone talk to you about when and where to follow up with a doctor after you left the hospital? Yes   What do you believe caused you to be sick enough to be re-admitted? a fib   Living Arrangements house   Does the patient have family/friends to help with healtcare needs after discharge? yes   Are you currently feeling confused? No   Are you currently having problems thinking? No   Are you currently having memory problems? No

## 2020-10-27 NOTE — PLAN OF CARE
10/26/20 2035   Patient Assessment/Suction   Level of Consciousness (AVPU) alert   Respiratory Effort Normal;Unlabored   Expansion/Accessory Muscles/Retractions no retractions;no use of accessory muscles   All Lung Fields Breath Sounds diminished   STEPHANIE Breath Sounds wheezes, expiratory   RUL Breath Sounds wheezes, expiratory   PRE-TX-O2   O2 Device (Oxygen Therapy) nasal cannula   Flow (L/min) 4   SpO2 (!) 94 %   Pulse Oximetry Type Continuous   $ Pulse Oximetry - Multiple Charge Pulse Oximetry - Multiple   Pulse (!) 115   Resp (!) 22   Positioning   Body Position weight shift assistance provided;side-lying, left   Head of Bed (HOB) HOB at 20-30 degrees   Positioning/Transfer Devices pillows;in use   Aerosol Therapy   $ Aerosol Therapy Charges Aerosol Treatment   Daily Review of Necessity (SVN) completed   Respiratory Treatment Status (SVN) given   Treatment Route (SVN) mask   Patient Position (SVN) semi-Clark's   Post Treatment Assessment (SVN) increased aeration   Signs of Intolerance (SVN) none   Breath Sounds Post-Respiratory Treatment   Throughout All Fields Post-Treatment All Fields   Throughout All Fields Post-Treatment aeration increased   Post-treatment Heart Rate (beats/min) 120   Post-treatment Resp Rate (breaths/min) 20   Wound Care   Skin Temperature warm   continue tx. As ordered

## 2020-10-28 LAB
ANION GAP SERPL CALC-SCNC: 19 MMOL/L (ref 8–16)
BASOPHILS # BLD AUTO: 0.02 K/UL (ref 0–0.2)
BASOPHILS NFR BLD: 0.2 % (ref 0–1.9)
BUN SERPL-MCNC: 55 MG/DL (ref 8–23)
CALCIUM SERPL-MCNC: 7.5 MG/DL (ref 8.7–10.5)
CHLORIDE SERPL-SCNC: 91 MMOL/L (ref 95–110)
CO2 SERPL-SCNC: 23 MMOL/L (ref 23–29)
CREAT SERPL-MCNC: 6.7 MG/DL (ref 0.5–1.4)
DIFFERENTIAL METHOD: ABNORMAL
EOSINOPHIL # BLD AUTO: 0.1 K/UL (ref 0–0.5)
EOSINOPHIL NFR BLD: 1.5 % (ref 0–8)
ERYTHROCYTE [DISTWIDTH] IN BLOOD BY AUTOMATED COUNT: 17.1 % (ref 11.5–14.5)
EST. GFR  (AFRICAN AMERICAN): 6.4 ML/MIN/1.73 M^2
EST. GFR  (NON AFRICAN AMERICAN): 5.6 ML/MIN/1.73 M^2
GLUCOSE SERPL-MCNC: 75 MG/DL (ref 70–110)
HCT VFR BLD AUTO: 31.7 % (ref 37–48.5)
HGB BLD-MCNC: 9.4 G/DL (ref 12–16)
IMM GRANULOCYTES # BLD AUTO: 0.03 K/UL (ref 0–0.04)
IMM GRANULOCYTES NFR BLD AUTO: 0.4 % (ref 0–0.5)
INR PPP: 2.8
LYMPHOCYTES # BLD AUTO: 0.6 K/UL (ref 1–4.8)
LYMPHOCYTES NFR BLD: 6.9 % (ref 18–48)
MAGNESIUM SERPL-MCNC: 2.1 MG/DL (ref 1.6–2.6)
MCH RBC QN AUTO: 27.6 PG (ref 27–31)
MCHC RBC AUTO-ENTMCNC: 29.7 G/DL (ref 32–36)
MCV RBC AUTO: 93 FL (ref 82–98)
MONOCYTES # BLD AUTO: 0.5 K/UL (ref 0.3–1)
MONOCYTES NFR BLD: 6.7 % (ref 4–15)
NEUTROPHILS # BLD AUTO: 6.8 K/UL (ref 1.8–7.7)
NEUTROPHILS NFR BLD: 84.3 % (ref 38–73)
NRBC BLD-RTO: 0 /100 WBC
PHOSPHATE SERPL-MCNC: 5.5 MG/DL (ref 2.7–4.5)
PLATELET # BLD AUTO: 207 K/UL (ref 150–350)
PMV BLD AUTO: 10.7 FL (ref 9.2–12.9)
POTASSIUM SERPL-SCNC: 4.2 MMOL/L (ref 3.5–5.1)
PROTHROMBIN TIME: 28.6 SEC (ref 10.6–14.8)
RBC # BLD AUTO: 3.4 M/UL (ref 4–5.4)
SODIUM SERPL-SCNC: 133 MMOL/L (ref 136–145)
WBC # BLD AUTO: 8.07 K/UL (ref 3.9–12.7)

## 2020-10-28 PROCEDURE — 84100 ASSAY OF PHOSPHORUS: CPT

## 2020-10-28 PROCEDURE — 80048 BASIC METABOLIC PNL TOTAL CA: CPT

## 2020-10-28 PROCEDURE — 63600175 PHARM REV CODE 636 W HCPCS: Performed by: NURSE PRACTITIONER

## 2020-10-28 PROCEDURE — 25000242 PHARM REV CODE 250 ALT 637 W/ HCPCS: Performed by: NURSE PRACTITIONER

## 2020-10-28 PROCEDURE — 27000221 HC OXYGEN, UP TO 24 HOURS

## 2020-10-28 PROCEDURE — 25000003 PHARM REV CODE 250: Performed by: INTERNAL MEDICINE

## 2020-10-28 PROCEDURE — 25000003 PHARM REV CODE 250: Performed by: NURSE PRACTITIONER

## 2020-10-28 PROCEDURE — 94761 N-INVAS EAR/PLS OXIMETRY MLT: CPT

## 2020-10-28 PROCEDURE — 83735 ASSAY OF MAGNESIUM: CPT

## 2020-10-28 PROCEDURE — 21000000 HC CCU ICU ROOM CHARGE

## 2020-10-28 PROCEDURE — 99900031 HC PATIENT EDUCATION (STAT)

## 2020-10-28 PROCEDURE — 85025 COMPLETE CBC W/AUTO DIFF WBC: CPT

## 2020-10-28 PROCEDURE — 94640 AIRWAY INHALATION TREATMENT: CPT

## 2020-10-28 PROCEDURE — 90935 HEMODIALYSIS ONE EVALUATION: CPT

## 2020-10-28 PROCEDURE — 85610 PROTHROMBIN TIME: CPT

## 2020-10-28 PROCEDURE — 36415 COLL VENOUS BLD VENIPUNCTURE: CPT

## 2020-10-28 PROCEDURE — 99900035 HC TECH TIME PER 15 MIN (STAT)

## 2020-10-28 RX ORDER — DILTIAZEM HYDROCHLORIDE 30 MG/1
30 TABLET, FILM COATED ORAL EVERY 6 HOURS
Status: DISCONTINUED | OUTPATIENT
Start: 2020-10-28 | End: 2020-10-29 | Stop reason: HOSPADM

## 2020-10-28 RX ADMIN — DILTIAZEM HYDROCHLORIDE 30 MG: 30 TABLET, FILM COATED ORAL at 06:10

## 2020-10-28 RX ADMIN — PIPERACILLIN AND TAZOBACTAM 3.38 G: 3; .375 INJECTION, POWDER, FOR SOLUTION INTRAVENOUS at 04:10

## 2020-10-28 RX ADMIN — LEVALBUTEROL HYDROCHLORIDE 1.25 MG: 1.25 SOLUTION, CONCENTRATE RESPIRATORY (INHALATION) at 01:10

## 2020-10-28 RX ADMIN — LEVALBUTEROL HYDROCHLORIDE 1.25 MG: 1.25 SOLUTION, CONCENTRATE RESPIRATORY (INHALATION) at 08:10

## 2020-10-28 RX ADMIN — DILTIAZEM HYDROCHLORIDE 30 MG: 30 TABLET, FILM COATED ORAL at 09:10

## 2020-10-28 RX ADMIN — MAGNESIUM OXIDE 400 MG: 400 TABLET ORAL at 02:10

## 2020-10-28 RX ADMIN — PANTOPRAZOLE SODIUM 40 MG: 40 TABLET, DELAYED RELEASE ORAL at 02:10

## 2020-10-28 RX ADMIN — METOPROLOL SUCCINATE 25 MG: 25 TABLET, FILM COATED, EXTENDED RELEASE ORAL at 08:10

## 2020-10-28 RX ADMIN — PIPERACILLIN AND TAZOBACTAM 3.38 G: 3; .375 INJECTION, POWDER, FOR SOLUTION INTRAVENOUS at 06:10

## 2020-10-28 RX ADMIN — CALCIUM ACETATE 667 MG: 667 CAPSULE ORAL at 02:10

## 2020-10-28 RX ADMIN — MELATONIN 6 MG: at 08:10

## 2020-10-28 RX ADMIN — LEVALBUTEROL HYDROCHLORIDE 1.25 MG: 1.25 SOLUTION, CONCENTRATE RESPIRATORY (INHALATION) at 07:10

## 2020-10-28 NOTE — PROGRESS NOTES
"[facility] Progress Note    Subjective:     Feels better since admission. Has been much more calm and cooperative. Remains in afib with rate in the low 100s.  C/o some pain in the R great toe that has been there for "months and months"     Objective:   Last 24 Hour Vital Signs:  BP  Min: 78/60  Max: 135/57  Temp  Av.4 °F (36.9 °C)  Min: 97.7 °F (36.5 °C)  Max: 99.9 °F (37.7 °C)  Pulse  Av  Min: 95  Max: 134  Resp  Av.4  Min: 19  Max: 32  SpO2  Av.3 %  Min: 86 %  Max: 100 %  Height  Av' 5" (165.1 cm)  Min: 5' 5" (165.1 cm)  Max: 5' 5" (165.1 cm)  Weight  Av.6 kg (131 lb 6.3 oz)  Min: 59.1 kg (130 lb 4.7 oz)  Max: 60.1 kg (132 lb 7.9 oz)  I/O last 3 completed shifts:  In: 805 [P.O.:240; I.V.:465; IV Piggyback:100]  Out: 0     Physical Examination:  Physical Exam  Vitals signs and nursing note reviewed.   Constitutional:       Appearance: She is not in distress.  HENT:      Head: Normocephalic and atraumatic.      Nose: Nose normal.      Mouth/Throat:      Mouth: Mucous membranes are moist.      Pharynx: Oropharynx is clear.   Eyes:      Extraocular Movements: Extraocular movements intact.      Pupils: Pupils are equal, round, and reactive to light.   Neck:      Musculoskeletal: Normal range of motion and neck supple.   R great toe is mildly TTP. Full passive ROM without pain, no lesions or erythema appreciate.   Cardiovascular:      Rate and Rhythm: Tachycardia present. Rhythm irregular.      Pulses: Normal pulses.      Heart sounds: Murmur present.   Pulmonary:      Effort: Pulmonary effort is normal.      Breath sounds: Rhonchi and rales present.      Comments: RLL coarse  Abdominal:      General: Bowel sounds are normal.      Palpations: Abdomen is soft.   Musculoskeletal: Normal range of motion.   Skin:     General: Skin is warm and dry.      Capillary Refill: Capillary refill takes less than 2 seconds.   Neurological:      General: No focal deficit present.      Mental " Status: She is alert and oriented to person, place, and time.          Laboratory:  Laboratory Data Reviewed: yes    Most Recent Data:  CBC:   Lab Results   Component Value Date    WBC 13.06 (H) 10/26/2020    HGB 9.7 (L) 10/26/2020    HCT 31.8 (L) 10/26/2020     10/26/2020    MCV 91 10/26/2020    RDW 17.2 (H) 10/26/2020     BMP:   Lab Results   Component Value Date     10/27/2020    K 4.8 10/27/2020    CL 93 (L) 10/27/2020    CO2 26 10/27/2020    BUN 45 (H) 10/27/2020    GLU 76 10/27/2020    CALCIUM 8.1 (L) 10/27/2020    MG 2.1 10/27/2020    PHOS 5.7 (H) 10/27/2020     LFTs:   Lab Results   Component Value Date    PROT 7.1 10/26/2020    ALBUMIN 2.9 (L) 10/26/2020    BILITOT 1.6 (H) 10/26/2020    AST 40 10/26/2020    ALKPHOS 124 10/26/2020    ALT 26 10/26/2020     Coags:   Lab Results   Component Value Date    INR 4.5 10/27/2020     FLP:   Lab Results   Component Value Date    CHOL 155 10/19/2019    HDL 56 10/19/2019    LDLCALC 82.6 10/19/2019    TRIG 82 10/19/2019    CHOLHDL 36.1 10/19/2019     DM:   Lab Results   Component Value Date    HGBA1C 5.0 10/19/2019    HGBA1C 5.2 08/07/2019    HGBA1C 4.9 07/25/2017    LDLCALC 82.6 10/19/2019    CREATININE 5.8 (H) 10/27/2020     Thyroid:   Lab Results   Component Value Date    TSH 3.210 09/21/2020    FREET4 0.90 04/12/2020     Anemia:   Lab Results   Component Value Date    IRON 28 (L) 07/18/2017    TIBC 153 (L) 07/18/2017    FERRITIN 912 (H) 04/22/2020    QQDDHTPP19 1045 (H) 08/08/2019    FOLATE 4.5 08/08/2019     Cardiac:   Lab Results   Component Value Date    TROPONINI 0.059 (H) 10/27/2020    BNP >4,500 (H) 10/26/2020     Urinalysis:   Lab Results   Component Value Date    LABURIN  05/28/2016     70,000 cfu/ml of 4 or more different organisms of mixed     LABURIN  05/28/2016     positive/negative rosamaria isolated. None in predominance.    LABURIN Probable contamination, no further work-up. 05/28/2016    COLORU Yellow 05/28/2016    SPECGRAV 1.015 05/28/2016     NITRITE Negative 05/28/2016    KETONESU Negative 05/28/2016    UROBILINOGEN Negative 05/28/2016    WBCUA 3 05/28/2016        Radiology:  Data Reviewed: yes  XR CHEST AP PORTABLE      Current Medications:     Infusions:   dilTIAZem 5 mg/hr (10/27/20 1512)        Scheduled:   calcium acetate(phosphat bind)  667 mg Oral Daily    isosorbide mononitrate  30 mg Oral Daily    levalbuterol  1.25 mg Nebulization TID WAKE    magnesium oxide  400 mg Oral Daily    metoprolol succinate  25 mg Oral Nightly    pantoprazole  40 mg Oral Daily    piperacillin-tazobactam (ZOSYN) IVPB  3.375 g Intravenous Q12H    senna-docusate 8.6-50 mg  1 tablet Oral BID        PRN:  acetaminophen, melatonin, ondansetron, polyethylene glycol, sodium chloride 0.9%, Pharmacy to dose Vancomycin consult **AND** vancomycin - pharmacy to dose    Lines and Day Number of Therapy:      Microbiology Data:  Reviewed: yes  Microbiology Results (last 7 days)     Procedure Component Value Units Date/Time    Blood culture #2 **CANNOT BE ORDERED STAT** [[id number]] Collected: 10/26/20 1652    Order Status: Completed Specimen: Blood from Peripheral, Forearm, Left Updated: 10/27/20 1832     Blood Culture, Routine No Growth to date      No Growth to date    Blood culture #1 **CANNOT BE ORDERED STAT** [[id number]] Collected: 10/26/20 1642    Order Status: Completed Specimen: Blood from Peripheral, Antecubital, Left Updated: 10/27/20 1832     Blood Culture, Routine No Growth to date      No Growth to date    Culture, Respiratory with Gram Stain [[id number]]     Order Status: Sent Specimen: Respiratory from Sputum, Expectorated            Antibiotics and Day Number of Therapy:  Antibiotics (From admission, onward)    Start     Stop Route Frequency Ordered    10/27/20 0500  piperacillin-tazobactam 3.375 g in dextrose 5 % 50 mL IVPB (ready to mix system)      -- IV Every 12 hours (non-standard times) 10/26/20 1935    10/26/20 1935  vancomycin - pharmacy to dose   (vancomycin IVPB)      -- IV pharmacy to manage frequency 10/26/20 1935         Antivirals (From admission, onward)    None             Assessment/Plan:     Griselda Neely is a 75 y.o.female with       Active Hospital Problems    Diagnosis  POA    *Atrial fibrillation with rapid ventricular response [I48.91]  Yes    Right lower lobe pneumonia [J18.9]  Yes    Acute on chronic systolic heart failure [I50.9]  Yes    Encephalopathy chronic [G93.49]  Yes    Sepsis [A41.9]  Yes    Anemia due to chronic kidney disease [N18.9, D63.1]  Yes     Likely of chronic disease.       ESRD (end stage renal disease) on HD M,W, F [N18.6]  Yes     Chronic    HTN (hypertension) [I10]  Yes     Chronic      Resolved Hospital Problems   No resolved problems to display.     PLAN:  Admit to Cardiology A  cardizem gtt - wean as tolerated  Blood cultures drawn in ED  Get sputum cultures with gram stain  Cover with zosyn and vanc given multiple recent admissions  Check procal  levalbuterol nebulizers  Consult nephrology for HD  Repeat lactic acid in 4 hours  Will hold losartan and add parameters to metoprolol and imdur  Holding PO cardizem while getting IV, resume when clinically indicated  INR was 4 yesterday, holding coumadin and repeat PT/INR daily, resume when clinically indicated  DNI       Daren Lea  [facility] Medicine

## 2020-10-28 NOTE — PROGRESS NOTES
Pharmacokinetic Assessment Follow Up: IV Vancomycin    Vancomycin serum concentration assessment(s):    The random level was drawn correctly and can be used to guide therapy at this time. The measurement is within the desired definitive target range of 10 to 15 mcg/mL.    Vancomycin Regimen Plan:    Re-dose with 1000 mg. Draw Random level after next dialysis    Drug levels (last 3 results):  Recent Labs   Lab Result Units 10/27/20  2038   Vancomycin, Random ug/mL 11.9       Pharmacy will continue to follow and monitor vancomycin.    Please contact pharmacy at extension [phone] for questions regarding this assessment.    Thank you for the consult,   Dario Sargent       Patient brief summary:  Griselda Neely is a 75 y.o. female initiated on antimicrobial therapy with IV Vancomycin for treatment of  pneumonia    The patient's current regimen is intermittent dosing based on random levels    Drug Allergies:   Review of patient's allergies indicates:  No Known Allergies    Actual Body Weight:   59.1 kg    Renal Function:   Estimated Creatinine Clearance: 7.7 mL/min (A) (based on SCr of 5.8 mg/dL (H)).,     Dialysis Method (if applicable):  intermittent HD

## 2020-10-28 NOTE — PROGRESS NOTES
[facility]  Nephrology  Progress Note    Patient Name: Griselda Neely  MRN: [id number]  Admission Date: 10/26/2020  Hospital Length of Stay: 2 days  Attending Provider: Daren Lea MD   Primary Care Physician: Kalie Neely MD  Principal Problem:Atrial fibrillation with rapid ventricular response    Consults  Subjective:     Interval History:   Awake and alert; seen and examined during hemodialysis.  No acute events overnight      Review of patient's allergies indicates:  No Known Allergies  Current Facility-Administered Medications   Medication Frequency    acetaminophen tablet 650 mg Q4H PRN    calcium acetate(phosphat bind) capsule 667 mg Daily    diltiaZEM 125 mg in dextrose 5% 125 mL infusion (non-titrating) Continuous    diltiaZEM tablet 30 mg Q6H    isosorbide mononitrate 24 hr tablet 30 mg Daily    levalbuterol nebulizer solution 1.25 mg TID WAKE    magnesium oxide tablet 400 mg Daily    melatonin tablet 6 mg Nightly PRN    metoprolol succinate (TOPROL-XL) 24 hr tablet 25 mg Nightly    ondansetron injection 4 mg Q8H PRN    pantoprazole EC tablet 40 mg Daily    piperacillin-tazobactam 3.375 g in dextrose 5 % 50 mL IVPB (ready to mix system) Q12H    polyethylene glycol packet 17 g BID PRN    senna-docusate 8.6-50 mg per tablet 1 tablet BID    sodium chloride 0.9% flush 10 mL PRN    vancomycin - pharmacy to dose pharmacy to manage frequency       Objective:     Vital Signs (Most Recent):  Temp: 97.7 °F (36.5 °C) (10/28/20 1500)  Pulse: (!) 129 (10/28/20 1500)  Resp: 19 (10/28/20 1500)  BP: (!) 93/54 (10/28/20 1500)  SpO2: 100 % (10/28/20 1350)  O2 Device (Oxygen Therapy): nasal cannula (10/28/20 1500) Vital Signs (24h Range):  Temp:  [97.4 °F (36.3 °C)-98 °F (36.7 °C)] 97.7 °F (36.5 °C)  Pulse:  [] 129  Resp:  [15-30] 19  SpO2:  [88 %-100 %] 100 %  BP: ()/(32-68) 93/54     Weight: 59.1 kg (130 lb 4.7 oz) (10/27/20 0340)  Body mass index is 21.68  kg/m².  Body surface area is 1.65 meters squared.    I/O last 3 completed shifts:  In: 855 [P.O.:240; I.V.:465; IV Piggyback:150]  Out: 0     Physical Exam     Physical Exam  Vitals signs and nursing note reviewed.   Constitutional:       Appearance: Normal appearance.   HENT:      Head: Normocephalic and atraumatic.      Nose: Nose normal.      Mouth/Throat:      Mouth: Mucous membranes are moist.   Eyes:      Pupils: Pupils are equal, round, and reactive to light.   Neck:      Musculoskeletal: Normal range of motion and neck supple.      Comments: No JVD  Cardiovascular:      Rate and Rhythm: Tachycardia present. Rhythm irregular.      Pulses: Normal pulses.   Pulmonary:      Effort: Pulmonary effort is normal.      Breath sounds: Normal breath sounds.   Abdominal:      General: Bowel sounds are normal.      Palpations: Abdomen is soft.   Musculoskeletal:      Right lower leg: No edema.      Left lower leg: No edema.   Skin:     General: Skin is warm and dry.   Neurological:      Mental Status: She is alert and oriented to person, place, and time.   Psychiatric:         Mood and Affect: Mood normal.         Behavior: Behavior normal.        Significant Labs:sure  CBC:   Recent Labs   Lab 10/28/20  0518   WBC 8.07   RBC 3.40*   HGB 9.4*   HCT 31.7*      MCV 93   MCH 27.6   MCHC 29.7*     CMP:   Recent Labs   Lab 10/26/20  1559  10/28/20  0517   GLU 77   < > 75   CALCIUM 8.7   < > 7.5*   ALBUMIN 2.9*  --   --    PROT 7.1  --   --       < > 133*   K 5.1   < > 4.2   CO2 27   < > 23   CL 94*   < > 91*   BUN 36*   < > 55*   CREATININE 5.0*   < > 6.7*   ALKPHOS 124  --   --    ALT 26  --   --    AST 40  --   --    BILITOT 1.6*  --   --     < > = values in this interval not displayed.     All labs within the past 24 hours have been reviewed.    Significant Imaging:      Assessment/Plan:     Active Diagnoses:    Diagnosis Date Noted POA    PRINCIPAL PROBLEM:  Atrial fibrillation with rapid ventricular  response [I48.91] 10/26/2020 Yes    Right lower lobe pneumonia [J18.9] 10/26/2020 Yes    Acute on chronic systolic heart failure [I50.9] 09/20/2020 Yes    Encephalopathy chronic [G93.49] 06/05/2020 Yes    Sepsis [A41.9] 09/07/2019 Yes    Anemia due to chronic kidney disease [N18.9, D63.1] 07/25/2017 Yes    ESRD (end stage renal disease) on HD M,W, F [N18.6] 05/11/2016 Yes     Chronic    HTN (hypertension) [I10] 08/15/2013 Yes     Chronic      Problems Resolved During this Admission:     Assessment and plan:     ESRD:  -M-W-F HD schedule; HD UF 2 L today, tolerated with no issues  -clinically improved today; wants to go home  -renal chemistries stable  -strict I&O, daily weight, low K/phos diet, avoid nephrotoxins (GFR appropriate)     Pneumonia:  -chest x-ray revealing potentially atelectasis and/or multifocal pneumonia   -empiric antibiotics  -HM following     Sepsis:  -lactic acid normalized  -Await blood cultures and sputum culture  -empiric antibiotics     Acute on chronic CHF:   -elevated BNP     Encephalopathy:  -mental clearing     Atrial fibrillation with RVR:  -Cardizem drip  -Coumadin on hold; INR 4     Anemia:  -stable at goal  -monitor closely    Hyponatremia:  -avoid hypotonic fluids  -limit free water intake  -titrate HD bath     Hypertension:  -controlled     Renal BMD:  -calcium corrects for low albumin  -phos elevated; continue binders  -maximize nutrition     Gabe Barksdale NP  Nephrology  [facility]

## 2020-10-28 NOTE — PROGRESS NOTES
Net uf 2000 mls     10/28/20 1120   Post-Hemodialysis Assessment   Rinseback Volume (mL) 250 mL   Blood Volume Processed (Liters) 47.7 L   Dialyzer Clearance Lightly streaked   Duration of Treatment (minutes) 250 minutes   Hemodialysis Intake (mL) 500 mL   Total UF (mL) 2500 mL   Net Fluid Removal 2000   Patient Response to Treatment tolerated well   Post-Treatment Weight 63.6 kg (140 lb 3.4 oz)   Treatment Weight Change -2   Arterial bleeding stop time (min) 10 min   Venous bleeding stop time (min) 10 min   Post-Hemodialysis Comments treatment complete, pt stable. no complications.

## 2020-10-28 NOTE — PLAN OF CARE
10/27/20 2013   Patient Assessment/Suction   Level of Consciousness (AVPU) alert   Respiratory Effort Unlabored   Expansion/Accessory Muscles/Retractions no use of accessory muscles   All Lung Fields Breath Sounds equal bilaterally;diminished   Rhythm/Pattern, Respiratory unlabored   Cough Frequency no cough   PRE-TX-O2   O2 Device (Oxygen Therapy) nasal cannula   Flow (L/min) 3   SpO2 96 %   Pulse Oximetry Type Continuous   $ Pulse Oximetry - Multiple Charge Pulse Oximetry - Multiple   Pulse 100   Resp 20   Aerosol Therapy   $ Aerosol Therapy Charges Aerosol Treatment   Daily Review of Necessity (SVN) completed   Respiratory Treatment Status (SVN) given   Treatment Route (SVN) mask   Patient Position (SVN) HOB elevated   Post Treatment Assessment (SVN) breath sounds unchanged   Signs of Intolerance (SVN) none   Breath Sounds Post-Respiratory Treatment   Post-treatment Heart Rate (beats/min) 104   Post-treatment Resp Rate (breaths/min) 20   Education   $ Education Bronchodilator;15 min   Respiratory Evaluation   $ Care Plan Tech Time 15 min   Evaluation For New Orders

## 2020-10-28 NOTE — CONSULTS
[facility]  Nephrology  Consult Note    Patient Name: Griselda Neely  MRN: [id number]  Admission Date: 10/26/2020  Hospital Length of Stay: 1 days  Attending Provider: Daren Lea MD   Primary Care Physician: Kalie Neely MD  Principal Problem:Atrial fibrillation with rapid ventricular response    Consults  Subjective:     HPI:  Griselda Neely is a 75-year-old female who arrived to the [facility] on 10/26 for evaluation of altered mental status, cough, shortness of breath, and fever.  She was seen in this ED 1 day prior for AFib RVR and volume overload.  She declined admission and instead followed up with dialysis with 3 L removed.  Past medical history including ESRD, CHF, anemia, hypertension, MVR, osteo, pancreatitis, PVD, pneumonia, and calciphylaxis.  Nephrology is consulted for ESRD management.  She attends [facility]) on M-W-F schedule.  Her nephrologist is Dr. Ingram.  Chest x-ray suggestive of multifocal pneumonia.  Started empiric antibiotics and await sputum and blood cultures.  Lactic acid normalized.  Continues on Cardizem drip for AFib.  Coumadin held for INR of 4.    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    both legs    CHF (congestive heart failure)     Encounter for blood transfusion 03/2016    Gout     Hemodialysis access, AV graft     Hypertension     Mitral valve regurgitation     Osteoarthritis     Pancreatitis     Peripheral vascular disease     Pneumonia 09/09/2017    Renal failure        Past Surgical History:   Procedure Laterality Date    ACHILLES TENDON SURGERY Right     ANGIOGRAPHY OF ARTERIOVENOUS SHUNT Right 1/7/2020    Procedure: Fistulogram with Possible Intervention;  Surgeon: Joseph Loyola MD;  Location: [facility] CATH/EP LAB;  Service: Cardiology;  Laterality: Right;    ANGIOGRAPHY OF LOWER EXTREMITY Left 3/18/2020    Procedure: Angiogram Extremity Unilateral;   Surgeon: Ali Khoobehi, MD;  Location: [facility] CATH/EP LAB;  Service: Cardiology;  Laterality: Left;    APPENDECTOMY      CARDIAC CATHETERIZATION  07/03/2017    CHOLECYSTECTOMY      COLONOSCOPY Left 10/4/2020    Procedure: COLONOSCOPY;  Surgeon: Jordan Kilpatrick MD;  Location: [facility] ENDO;  Service: Endoscopy;  Laterality: Left;    ESOPHAGOGASTRODUODENOSCOPY Left 8/17/2020    Procedure: EGD (ESOPHAGOGASTRODUODENOSCOPY);  Surgeon: Talat Trevizo MD;  Location: [facility] ENDO;  Service: Endoscopy;  Laterality: Left;    ESOPHAGOGASTRODUODENOSCOPY Left 10/2/2020    Procedure: EGD (ESOPHAGOGASTRODUODENOSCOPY);  Surgeon: Talat Trevizo MD;  Location: [facility] ENDO;  Service: Endoscopy;  Laterality: Left;    heal surgery Right     HYSTERECTOMY      INSERTION OF STENT INTO PERIPHERAL VESSEL N/A 1/7/2020    Procedure: INSERTION, STENT, VESSEL, PERIPHERAL;  Surgeon: Joseph Loyola MD;  Location: [facility] CATH/EP LAB;  Service: Cardiology;  Laterality: N/A;    MITRAL VALVE REPLACEMENT      PARATHYROIDECTOMY      PARATHYROIDECTOMY  07/13/2017    PERCUTANEOUS TRANSLUMINAL ANGIOPLASTY OF ARTERIOVENOUS FISTULA N/A 1/7/2020    Procedure: PTA, AV FISTULA;  Surgeon: Joseph Loyola MD;  Location: [facility] CATH/EP LAB;  Service: Cardiology;  Laterality: N/A;    PERITONEAL CATHETER INSERTION      RENAL BIOPSY      vocal cord nodule      WOUND DEBRIDEMENT Left 7/13/2020    Procedure: DEBRIDEMENT, WOUND;  Surgeon: Carlos Elam III, MD;  Location: [facility] OR;  Service: General;  Laterality: Left;       Review of patient's allergies indicates:  No Known Allergies  Current Facility-Administered Medications   Medication Frequency    acetaminophen tablet 650 mg Q4H PRN    calcium acetate(phosphat bind) capsule 667 mg Daily    diltiaZEM 125 mg in dextrose 5% 125 mL infusion (non-titrating) Continuous    isosorbide mononitrate 24 hr tablet 30 mg Daily    levalbuterol nebulizer solution 1.25 mg TID WAKE    magnesium oxide tablet 400  mg Daily    melatonin tablet 6 mg Nightly PRN    metoprolol succinate (TOPROL-XL) 24 hr tablet 25 mg Nightly    ondansetron injection 4 mg Q8H PRN    pantoprazole EC tablet 40 mg Daily    piperacillin-tazobactam 3.375 g in dextrose 5 % 50 mL IVPB (ready to mix system) Q12H    polyethylene glycol packet 17 g BID PRN    senna-docusate 8.6-50 mg per tablet 1 tablet BID    sodium chloride 0.9% flush 10 mL PRN    vancomycin - pharmacy to dose pharmacy to manage frequency     Family History     Problem Relation (Age of Onset)    Arthritis Mother    Diabetes Mother, Father    Early death Sister, Sister    Heart disease Sister, Maternal Grandfather, Mother    Heart failure Mother    Hypertension Mother        Tobacco Use    Smoking status: Current Some Day Smoker     Packs/day: 0.50     Years: 45.00     Pack years: 22.50     Types: Cigarettes    Smokeless tobacco: Never Used   Substance and Sexual Activity    Alcohol use: No    Drug use: No    Sexual activity: Not on file     Review of Systems   Constitutional: Negative for chills and fever.   Respiratory: Negative.  Negative for shortness of breath.    Cardiovascular: Negative.    Gastrointestinal: Negative.    Genitourinary: Negative.    Musculoskeletal: Negative.    Neurological: Negative.      Objective:     Vital Signs (Most Recent):  Temp: 97.8 °F (36.6 °C) (10/27/20 1910)  Pulse: 100 (10/27/20 2013)  Resp: 20 (10/27/20 2013)  BP: (!) 108/56 (10/27/20 1910)  SpO2: 96 % (10/27/20 2013)  O2 Device (Oxygen Therapy): High Flow nasal Cannula (10/27/20 1910) Vital Signs (24h Range):  Temp:  [97.7 °F (36.5 °C)-99.9 °F (37.7 °C)] 97.8 °F (36.6 °C)  Pulse:  [] 100  Resp:  [19-32] 20  SpO2:  [86 %-100 %] 96 %  BP: ()/(50-77) 108/56     Weight: 59.1 kg (130 lb 4.7 oz) (10/27/20 0340)  Body mass index is 21.68 kg/m².  Body surface area is 1.65 meters squared.    I/O last 3 completed shifts:  In: 805 [P.O.:240; I.V.:465; IV Piggyback:100]  Out: 0      Physical Exam  Vitals signs and nursing note reviewed.   Constitutional:       Appearance: Normal appearance.   HENT:      Head: Normocephalic and atraumatic.      Nose: Nose normal.      Mouth/Throat:      Mouth: Mucous membranes are moist.   Eyes:      Pupils: Pupils are equal, round, and reactive to light.   Neck:      Musculoskeletal: Normal range of motion and neck supple.      Comments: No JVD  Cardiovascular:      Rate and Rhythm: Tachycardia present. Rhythm irregular.      Pulses: Normal pulses.   Pulmonary:      Effort: Pulmonary effort is normal.      Breath sounds: Normal breath sounds.   Abdominal:      General: Bowel sounds are normal.      Palpations: Abdomen is soft.   Musculoskeletal:      Right lower leg: No edema.      Left lower leg: No edema.   Skin:     General: Skin is warm and dry.   Neurological:      Mental Status: She is alert and oriented to person, place, and time.   Psychiatric:         Mood and Affect: Mood normal.         Behavior: Behavior normal.         Significant Labs:  CBC:   Recent Labs   Lab 10/26/20  1559   WBC 13.06*   RBC 3.48*   HGB 9.7*   HCT 31.8*      MCV 91   MCH 27.9   MCHC 30.5*     CMP:   Recent Labs   Lab 10/26/20  1559 10/27/20  0606   GLU 77 76   CALCIUM 8.7 8.1*   ALBUMIN 2.9*  --    PROT 7.1  --     136   K 5.1 4.8   CO2 27 26   CL 94* 93*   BUN 36* 45*   CREATININE 5.0* 5.8*   ALKPHOS 124  --    ALT 26  --    AST 40  --    BILITOT 1.6*  --      All labs within the past 24 hours have been reviewed.    Significant Imaging:      Assessment/Plan:     Active Diagnoses:    Diagnosis Date Noted POA    PRINCIPAL PROBLEM:  Atrial fibrillation with rapid ventricular response [I48.91] 10/26/2020 Yes    Right lower lobe pneumonia [J18.9] 10/26/2020 Yes    Acute on chronic systolic heart failure [I50.9] 09/20/2020 Yes    Encephalopathy chronic [G93.49] 06/05/2020 Yes    Sepsis [A41.9] 09/07/2019 Yes    Anemia due to chronic kidney disease [N18.9,  D63.1] 07/25/2017 Yes    ESRD (end stage renal disease) on HD M,W, F [N18.6] 05/11/2016 Yes     Chronic    HTN (hypertension) [I10] 08/15/2013 Yes     Chronic      Problems Resolved During this Admission:     Assessment and plan:    ESRD:  -M-W-F HD schedule  -renal chemistries stable  -strict I&O, daily weight, low K/phos diet, avoid nephrotoxins (GFR appropriate)    Pneumonia:  -chest x-ray revealing potentially atelectasis and/or multifocal pneumonia   -empiric antibiotics  -HM following    Sepsis:  -lactic acid normalized  -Await blood cultures and sputum culture  -empiric antibiotics    Acute on chronic CHF:   -elevated BNP    Encephalopathy:  -mental clearing    Atrial fibrillation with RVR:  -Cardizem drip  -Coumadin on hold; INR 4    Anemia:  -stable at goal  -monitor closely    Hypertension:  -controlled    Renal BMD:  -calcium corrects for low albumin  -phos elevated; continue binders  -maximize nutrition        Thank you for your consult. I will follow-up with patient. Please contact us if you have any additional questions.    Gabe Barksdale, NP  Nephrology  [facility]

## 2020-10-28 NOTE — CARE UPDATE
10/28/20 0730   Patient Assessment/Suction   Level of Consciousness (AVPU) alert   Respiratory Effort Normal;Unlabored   Expansion/Accessory Muscles/Retractions no use of accessory muscles   All Lung Fields Breath Sounds diminished;clear   Rhythm/Pattern, Respiratory unlabored;pattern regular;depth regular   PRE-TX-O2   O2 Device (Oxygen Therapy) nasal cannula   Flow (L/min) 3   SpO2 99 %   Pulse Oximetry Type Continuous   $ Pulse Oximetry - Multiple Charge Pulse Oximetry - Multiple   Pulse 94   Resp (!) 22   Positioning Sitting in chair   Aerosol Therapy   $ Aerosol Therapy Charges Aerosol Treatment   Daily Review of Necessity (SVN) completed   Respiratory Treatment Status (SVN) given   Treatment Route (SVN) mask;oxygen   Patient Position (SVN) sitting in chair   Post Treatment Assessment (SVN) breath sounds unchanged   Signs of Intolerance (SVN) none   Education   $ Education Bronchodilator;15 min   Respiratory Evaluation   $ Care Plan Tech Time 15 min

## 2020-10-29 VITALS
TEMPERATURE: 97 F | OXYGEN SATURATION: 100 % | WEIGHT: 130.31 LBS | SYSTOLIC BLOOD PRESSURE: 124 MMHG | HEIGHT: 65 IN | BODY MASS INDEX: 21.71 KG/M2 | HEART RATE: 87 BPM | RESPIRATION RATE: 17 BRPM | DIASTOLIC BLOOD PRESSURE: 58 MMHG

## 2020-10-29 PROBLEM — A41.9 SEPSIS: Status: RESOLVED | Noted: 2019-09-07 | Resolved: 2020-10-29

## 2020-10-29 PROBLEM — I50.9 ACUTE ON CHRONIC HEART FAILURE: Status: RESOLVED | Noted: 2020-09-20 | Resolved: 2020-10-29

## 2020-10-29 LAB
ANION GAP SERPL CALC-SCNC: 14 MMOL/L (ref 8–16)
BUN SERPL-MCNC: 35 MG/DL (ref 8–23)
CALCIUM SERPL-MCNC: 7.5 MG/DL (ref 8.7–10.5)
CHLORIDE SERPL-SCNC: 98 MMOL/L (ref 95–110)
CO2 SERPL-SCNC: 24 MMOL/L (ref 23–29)
CREAT SERPL-MCNC: 4.9 MG/DL (ref 0.5–1.4)
EST. GFR  (AFRICAN AMERICAN): 9.4 ML/MIN/1.73 M^2
EST. GFR  (NON AFRICAN AMERICAN): 8.1 ML/MIN/1.73 M^2
GLUCOSE SERPL-MCNC: 71 MG/DL (ref 70–110)
INR PPP: 2
MAGNESIUM SERPL-MCNC: 1.9 MG/DL (ref 1.6–2.6)
PHOSPHATE SERPL-MCNC: 3.8 MG/DL (ref 2.7–4.5)
POTASSIUM SERPL-SCNC: 4.1 MMOL/L (ref 3.5–5.1)
PROTHROMBIN TIME: 22 SEC (ref 10.6–14.8)
SODIUM SERPL-SCNC: 136 MMOL/L (ref 136–145)
VANCOMYCIN SERPL-MCNC: 19.3 UG/ML

## 2020-10-29 PROCEDURE — 85610 PROTHROMBIN TIME: CPT

## 2020-10-29 PROCEDURE — 99223 1ST HOSP IP/OBS HIGH 75: CPT | Mod: ,,, | Performed by: INTERNAL MEDICINE

## 2020-10-29 PROCEDURE — 25000242 PHARM REV CODE 250 ALT 637 W/ HCPCS: Performed by: NURSE PRACTITIONER

## 2020-10-29 PROCEDURE — 80202 ASSAY OF VANCOMYCIN: CPT

## 2020-10-29 PROCEDURE — 25000003 PHARM REV CODE 250: Performed by: NURSE PRACTITIONER

## 2020-10-29 PROCEDURE — 84100 ASSAY OF PHOSPHORUS: CPT

## 2020-10-29 PROCEDURE — 99900035 HC TECH TIME PER 15 MIN (STAT)

## 2020-10-29 PROCEDURE — 94761 N-INVAS EAR/PLS OXIMETRY MLT: CPT

## 2020-10-29 PROCEDURE — 83735 ASSAY OF MAGNESIUM: CPT

## 2020-10-29 PROCEDURE — 63600175 PHARM REV CODE 636 W HCPCS: Performed by: NURSE PRACTITIONER

## 2020-10-29 PROCEDURE — 25000003 PHARM REV CODE 250: Performed by: INTERNAL MEDICINE

## 2020-10-29 PROCEDURE — 99223 PR INITIAL HOSPITAL CARE,LEVL III: ICD-10-PCS | Mod: ,,, | Performed by: INTERNAL MEDICINE

## 2020-10-29 PROCEDURE — 94640 AIRWAY INHALATION TREATMENT: CPT

## 2020-10-29 PROCEDURE — 27000221 HC OXYGEN, UP TO 24 HOURS

## 2020-10-29 PROCEDURE — 80048 BASIC METABOLIC PNL TOTAL CA: CPT

## 2020-10-29 RX ORDER — AMOXICILLIN AND CLAVULANATE POTASSIUM 250; 125 MG/1; MG/1
1 TABLET, FILM COATED ORAL EVERY 12 HOURS
Qty: 14 TABLET | Refills: 0 | Status: ON HOLD | OUTPATIENT
Start: 2020-10-29 | End: 2020-11-06 | Stop reason: HOSPADM

## 2020-10-29 RX ADMIN — LEVALBUTEROL HYDROCHLORIDE 1.25 MG: 1.25 SOLUTION, CONCENTRATE RESPIRATORY (INHALATION) at 07:10

## 2020-10-29 RX ADMIN — PIPERACILLIN AND TAZOBACTAM 3.38 G: 3; .375 INJECTION, POWDER, FOR SOLUTION INTRAVENOUS at 05:10

## 2020-10-29 RX ADMIN — PANTOPRAZOLE SODIUM 40 MG: 40 TABLET, DELAYED RELEASE ORAL at 10:10

## 2020-10-29 RX ADMIN — CALCIUM ACETATE 667 MG: 667 CAPSULE ORAL at 10:10

## 2020-10-29 RX ADMIN — ISOSORBIDE MONONITRATE 30 MG: 30 TABLET, EXTENDED RELEASE ORAL at 10:10

## 2020-10-29 RX ADMIN — DILTIAZEM HYDROCHLORIDE 30 MG: 30 TABLET, FILM COATED ORAL at 05:10

## 2020-10-29 RX ADMIN — MAGNESIUM OXIDE 400 MG: 400 TABLET ORAL at 10:10

## 2020-10-29 RX ADMIN — SENNOSIDES AND DOCUSATE SODIUM 1 TABLET: 8.6; 5 TABLET ORAL at 10:10

## 2020-10-29 RX ADMIN — DILTIAZEM HYDROCHLORIDE 30 MG: 30 TABLET, FILM COATED ORAL at 12:10

## 2020-10-29 NOTE — PROGRESS NOTES
"[facility] Medicine Progress Note    Subjective:     10/27:  Feels better since admission. Has been much more calm and cooperative. Remains in afib with rate in the low 100s.  C/o some pain in the R great toe that has been there for "months and months"    10/28:  Says she wants to go home. Still on cardizem gtt, though at lower dose. Rate 110-120s. Denies pain, sob while at rest.      Objective:   Last 24 Hour Vital Signs:  BP  Min: 48/32  Max: 156/79  Temp  Av.8 °F (36.6 °C)  Min: 97.4 °F (36.3 °C)  Max: 98.5 °F (36.9 °C)  Pulse  Av.9  Min: 60  Max: 132  Resp  Av.9  Min: 15  Max: 30  SpO2  Av.3 %  Min: 88 %  Max: 100 %  I/O last 3 completed shifts:  In: 1245 [P.O.:480; I.V.:115; Other:500; IV Piggyback:150]  Out: 2500 [Other:2500]    Physical Examination:  Physical Exam  Vitals signs and nursing note reviewed.   Constitutional:       Appearance: She is not in distress.  HENT:      Head: Normocephalic and atraumatic.      Nose: Nose normal.      Mouth/Throat:      Mouth: Mucous membranes are moist.      Pharynx: Oropharynx is clear.   Eyes:      Extraocular Movements: Extraocular movements intact.      Pupils: Pupils are equal, round, and reactive to light.   Neck:      Musculoskeletal: Normal range of motion and neck supple.   R great toe is mildly TTP. Full passive ROM without pain, no lesions or erythema appreciate.   Cardiovascular:      Rate and Rhythm: Tachycardia present. Rhythm irregular.      Pulses: Normal pulses.      Heart sounds: Murmur present.   Pulmonary:      Effort: Pulmonary effort is normal.      Breath sounds: Rhonchi and rales present.      Comments: RLL coarse  Abdominal:      General: Bowel sounds are normal.      Palpations: Abdomen is soft.   Musculoskeletal: Normal range of motion.   Skin:     General: Skin is warm and dry.      Capillary Refill: Capillary refill takes less than 2 seconds.   Neurological:      General: No focal deficit present.      Mental Status: She " is alert and oriented to person, place, and time.          Laboratory:  Laboratory Data Reviewed: yes    Most Recent Data:  CBC:   Lab Results   Component Value Date    WBC 8.07 10/28/2020    HGB 9.4 (L) 10/28/2020    HCT 31.7 (L) 10/28/2020     10/28/2020    MCV 93 10/28/2020    RDW 17.1 (H) 10/28/2020     BMP:   Lab Results   Component Value Date     (L) 10/28/2020    K 4.2 10/28/2020    CL 91 (L) 10/28/2020    CO2 23 10/28/2020    BUN 55 (H) 10/28/2020    GLU 75 10/28/2020    CALCIUM 7.5 (L) 10/28/2020    MG 2.1 10/28/2020    PHOS 5.5 (H) 10/28/2020     LFTs:   Lab Results   Component Value Date    PROT 7.1 10/26/2020    ALBUMIN 2.9 (L) 10/26/2020    BILITOT 1.6 (H) 10/26/2020    AST 40 10/26/2020    ALKPHOS 124 10/26/2020    ALT 26 10/26/2020     Coags:   Lab Results   Component Value Date    INR 2.8 10/28/2020     FLP:   Lab Results   Component Value Date    CHOL 155 10/19/2019    HDL 56 10/19/2019    LDLCALC 82.6 10/19/2019    TRIG 82 10/19/2019    CHOLHDL 36.1 10/19/2019     DM:   Lab Results   Component Value Date    HGBA1C 5.0 10/19/2019    HGBA1C 5.2 08/07/2019    HGBA1C 4.9 07/25/2017    LDLCALC 82.6 10/19/2019    CREATININE 6.7 (H) 10/28/2020     Thyroid:   Lab Results   Component Value Date    TSH 3.210 09/21/2020    FREET4 0.90 04/12/2020     Anemia:   Lab Results   Component Value Date    IRON 28 (L) 07/18/2017    TIBC 153 (L) 07/18/2017    FERRITIN 912 (H) 04/22/2020    [id number] 1045 (H) 08/08/2019    FOLATE 4.5 08/08/2019     Cardiac:   Lab Results   Component Value Date    TROPONINI 0.059 (H) 10/27/2020    BNP >4,500 (H) 10/26/2020     Urinalysis:   Lab Results   Component Value Date    LABURIN  05/28/2016     70,000 cfu/ml of 4 or more different organisms of mixed     LABURIN  05/28/2016     positive/negative rosamaria isolated. None in predominance.    LABURIN Probable contamination, no further work-up. 05/28/2016    COLORU Yellow 05/28/2016    SPECGRAV 1.015 05/28/2016    NITRITE  Negative 05/28/2016    KETONESU Negative 05/28/2016    UROBILINOGEN Negative 05/28/2016    WBCUA 3 05/28/2016        Radiology:  Data Reviewed: yes  XR CHEST AP PORTABLE      Current Medications:     Infusions:   dilTIAZem Stopped (10/28/20 0730)        Scheduled:   calcium acetate(phosphat bind)  667 mg Oral Daily    diltiaZEM  30 mg Oral Q6H    isosorbide mononitrate  30 mg Oral Daily    levalbuterol  1.25 mg Nebulization TID WAKE    magnesium oxide  400 mg Oral Daily    metoprolol succinate  25 mg Oral Nightly    pantoprazole  40 mg Oral Daily    piperacillin-tazobactam (ZOSYN) IVPB  3.375 g Intravenous Q12H    senna-docusate 8.6-50 mg  1 tablet Oral BID        PRN:  acetaminophen, melatonin, ondansetron, polyethylene glycol, sodium chloride 0.9%, Pharmacy to dose Vancomycin consult **AND** vancomycin - pharmacy to dose    Lines and Day Number of Therapy:      Microbiology Data:  Reviewed: yes  Microbiology Results (last 7 days)     Procedure Component Value Units Date/Time    Blood culture #1 **CANNOT BE ORDERED STAT** [[id number]] Collected: 10/26/20 1642    Order Status: Completed Specimen: Blood from Peripheral, Antecubital, Left Updated: 10/28/20 1832     Blood Culture, Routine No Growth to date      No Growth to date      No Growth to date    Blood culture #2 **CANNOT BE ORDERED STAT** [[id number]] Collected: 10/26/20 1652    Order Status: Completed Specimen: Blood from Peripheral, Forearm, Left Updated: 10/28/20 1832     Blood Culture, Routine No Growth to date      No Growth to date      No Growth to date    Culture, Respiratory with Gram Stain [[id number]]     Order Status: Sent Specimen: Respiratory from Sputum, Expectorated            Antibiotics and Day Number of Therapy:  Antibiotics (From admission, onward)    Start     Stop Route Frequency Ordered    10/27/20 0500  piperacillin-tazobactam 3.375 g in dextrose 5 % 50 mL IVPB (ready to mix system)      -- IV Every 12 hours (non-standard times)  10/26/20 1935    10/26/20 1935  vancomycin - pharmacy to dose  (vancomycin IVPB)      -- IV pharmacy to manage frequency 10/26/20 1935         Antivirals (From admission, onward)    None             Assessment/Plan:     Griselda Neely is a 75 y.o.female with    Active Hospital Problems    Diagnosis  POA    *Atrial fibrillation with rapid ventricular response [I48.91]  Yes    Right lower lobe pneumonia [J18.9]  Yes    Acute on chronic systolic heart failure [I50.9]  Yes    Encephalopathy chronic [G93.49]  Yes    Sepsis [A41.9]  Yes    Anemia due to chronic kidney disease [N18.9, D63.1]  Yes     Likely of chronic disease.       ESRD (end stage renal disease) on HD M,W, F [N18.6]  Yes     Chronic    HTN (hypertension) [I10]  Yes     Chronic      Resolved Hospital Problems   No resolved problems to display.     PLAN:  cardizem gtt - wean as tolerated  Blood cultures drawn in ED  Get sputum cultures with gram stain  Cover with zosyn and vanc given multiple recent admissions  - d/c vanc if cultures remain negative tmrw   levalbuterol nebulizers  Consult nephrology for HD  Will hold losartan and add parameters to metoprolol and imdur  Resume PO cardizem and attempt to wean drip   holding coumadin and repeat PT/INR daily, resume when clinically indicated discussed with Dr. Powell, goal INR ~2 due to hx of GIB issues.   DNI       Daren Lea  [facility]

## 2020-10-29 NOTE — CARE UPDATE
Patient requests Medicaid. CM spoke with patient and sent e-mail to Christel Patel to facilitate process.

## 2020-10-29 NOTE — PROGRESS NOTES
[facility]  Nephrology  Progress Note    Patient Name: Griselda Neely  MRN: [id number]  Admission Date: 10/26/2020  Hospital Length of Stay: 3 days  Attending Provider: No att. providers found   Primary Care Physician: Kalie Neely MD  Principal Problem:Atrial fibrillation with rapid ventricular response    Consults  Subjective:     Interval History:   Awake and alert; feeling better; discharging today.  No acute events overnight      Review of patient's allergies indicates:  No Known Allergies  Current Facility-Administered Medications   Medication Frequency    acetaminophen tablet 650 mg Q4H PRN    calcium acetate(phosphat bind) capsule 667 mg Daily    diltiaZEM 125 mg in dextrose 5% 125 mL infusion (non-titrating) Continuous    diltiaZEM tablet 30 mg Q6H    isosorbide mononitrate 24 hr tablet 30 mg Daily    levalbuterol nebulizer solution 1.25 mg TID WAKE    magnesium oxide tablet 400 mg Daily    melatonin tablet 6 mg Nightly PRN    metoprolol succinate (TOPROL-XL) 24 hr tablet 25 mg Nightly    ondansetron injection 4 mg Q8H PRN    pantoprazole EC tablet 40 mg Daily    piperacillin-tazobactam 3.375 g in dextrose 5 % 50 mL IVPB (ready to mix system) Q12H    polyethylene glycol packet 17 g BID PRN    senna-docusate 8.6-50 mg per tablet 1 tablet BID    sodium chloride 0.9% flush 10 mL PRN    vancomycin - pharmacy to dose pharmacy to manage frequency     Current Outpatient Medications   Medication    aspirin (ECOTRIN) 81 MG EC tablet    calcium acetate (PHOSLO) 667 mg capsule    diltiaZEM (CARDIZEM CD) 180 MG 24 hr capsule    folic acid/vit B complex and C (JENNIFER-KODAK ORAL)    isosorbide mononitrate (IMDUR) 30 MG 24 hr tablet    losartan (COZAAR) 25 MG tablet    magnesium oxide (MAG-OX) 400 mg (241.3 mg magnesium) tablet    metoprolol succinate (TOPROL-XL) 25 MG 24 hr tablet    ondansetron (ZOFRAN-ODT) 4 MG TbDL    oxyCODONE-acetaminophen (PERCOCET) 5-325 mg per tablet     pantoprazole (PROTONIX) 40 MG tablet    warfarin (COUMADIN) 2.5 MG tablet    zinc sulfate 220 mg Tab tablet    amoxicillin-clavulanate 250-125mg (AUGMENTIN) 250-125 mg Tab    wheelchair An       Objective:     Vital Signs (Most Recent):  Temp: 97.4 °F (36.3 °C) (10/29/20 1200)  Pulse: 87 (10/29/20 1200)  Resp: 17 (10/29/20 1200)  BP: (!) 124/58 (10/29/20 1200)  SpO2: 100 % (10/29/20 0737)  O2 Device (Oxygen Therapy): nasal cannula (10/29/20 0930) Vital Signs (24h Range):  Temp:  [97.4 °F (36.3 °C)-98.5 °F (36.9 °C)] 97.4 °F (36.3 °C)  Pulse:  [] 87  Resp:  [15-19] 17  SpO2:  [94 %-100 %] 100 %  BP: (105-175)/() 124/58     Weight: 59.1 kg (130 lb 4.7 oz) (10/27/20 0340)  Body mass index is 21.68 kg/m².  Body surface area is 1.65 meters squared.    I/O last 3 completed shifts:  In: 790 [P.O.:240; Other:500; IV Piggyback:50]  Out: 2500 [Other:2500]    Physical Exam     Physical Exam  Vitals signs and nursing note reviewed.   Constitutional:       Appearance: Normal appearance.   HENT:      Head: Normocephalic and atraumatic.      Nose: Nose normal.      Mouth/Throat:      Mouth: Mucous membranes are moist.   Eyes:      Pupils: Pupils are equal, round, and reactive to light.   Neck:      Musculoskeletal: Normal range of motion and neck supple.      Comments: No JVD  Cardiovascular:      Rate and Rhythm: normal rate; irregular rhythm .      Pulses: Normal pulses.   Pulmonary:      Effort: Pulmonary effort is normal.      Breath sounds: Normal breath sounds.   Abdominal:      General: Bowel sounds are normal.      Palpations: Abdomen is soft.   Musculoskeletal:      Right lower leg: No edema.      Left lower leg: No edema.   Skin:     General: Skin is warm and dry.   Neurological:      Mental Status: She is alert and oriented to person, place, and time.   Psychiatric:         Mood and Affect: Mood normal.         Behavior: Behavior normal.        Significant Labs:sure  CBC:   Recent Labs   Lab  10/28/20  0518   WBC 8.07   RBC 3.40*   HGB 9.4*   HCT 31.7*      MCV 93   MCH 27.6   MCHC 29.7*     CMP:   Recent Labs   Lab 10/26/20  1559  10/29/20  0349   GLU 77   < > 71   CALCIUM 8.7   < > 7.5*   ALBUMIN 2.9*  --   --    PROT 7.1  --   --       < > 136   K 5.1   < > 4.1   CO2 27   < > 24   CL 94*   < > 98   BUN 36*   < > 35*   CREATININE 5.0*   < > 4.9*   ALKPHOS 124  --   --    ALT 26  --   --    AST 40  --   --    BILITOT 1.6*  --   --     < > = values in this interval not displayed.     All labs within the past 24 hours have been reviewed.    Significant Imaging:      Assessment/Plan:     Active Diagnoses:    Diagnosis Date Noted POA    PRINCIPAL PROBLEM:  Atrial fibrillation with rapid ventricular response [I48.91] 10/26/2020 Yes    Right lower lobe pneumonia [J18.9] 10/26/2020 Yes    Encephalopathy chronic [G93.49] 06/05/2020 Yes    Anemia due to chronic kidney disease [N18.9, D63.1] 07/25/2017 Yes    ESRD (end stage renal disease) on HD M,W, F [N18.6] 05/11/2016 Yes     Chronic    HTN (hypertension) [I10] 08/15/2013 Yes     Chronic      Problems Resolved During this Admission:    Diagnosis Date Noted Date Resolved POA    Acute on chronic systolic heart failure [I50.9] 09/20/2020 10/29/2020 Yes    Sepsis [A41.9] 09/07/2019 10/29/2020 Yes     Assessment and plan:     ESRD:  -M-W-F HD schedule  -clinically improved today; plan for discharge today  -renal chemistries stable  -strict I&O, daily weight, low K/phos diet, avoid nephrotoxins (GFR appropriate)     Pneumonia:  -chest x-ray revealing potentially atelectasis and/or multifocal pneumonia   -empiric antibiotics  -HM following     Sepsis:  -lactic acid normalized  -Await blood cultures and sputum culture  -empiric antibiotics     Acute on chronic CHF:   -elevated BNP     Encephalopathy:  -mental clearing     Atrial fibrillation with RVR:  -Cardizem drip  -Coumadin on hold; INR 4     Anemia:  -stable at goal  -monitor  closely    Hyponatremia:  -avoid hypotonic fluids  -limit free water intake  -titrate HD bath     Hypertension:  -controlled     Renal BMD:  -calcium corrects for low albumin  -phos elevated; continue binders  -maximize nutrition    Plan for discharge today.  Maintain M-W-F HD schedule.  Patient defers any other diagnostic testing.  Follow- up on one week       Gabe Barksdale NP  Nephrology  [facility] hospital

## 2020-10-29 NOTE — PLAN OF CARE
10/29/20 0737   Patient Assessment/Suction   Level of Consciousness (AVPU) alert   Respiratory Effort Unlabored   All Lung Fields Breath Sounds clear   PRE-TX-O2   O2 Device (Oxygen Therapy) nasal cannula   $ Is the patient on Low Flow Oxygen? Yes   Flow (L/min) 3   SpO2 100 %   Pulse Oximetry Type Continuous   $ Pulse Oximetry - Multiple Charge Pulse Oximetry - Multiple   Pulse 80   Resp 16   Aerosol Therapy   $ Aerosol Therapy Charges Aerosol Treatment   Daily Review of Necessity (SVN) completed   Respiratory Treatment Status (SVN) given   Treatment Route (SVN) mask   Patient Position (SVN) sitting in chair   Post Treatment Assessment (SVN) breath sounds unchanged   Signs of Intolerance (SVN) none   Respiratory Evaluation   $ Care Plan Tech Time 15 min

## 2020-10-29 NOTE — PLAN OF CARE
10/29/20 1446   Final Note   Assessment Type Final Discharge Note   Anticipated Discharge Disposition Home   No needs at this time.

## 2020-10-29 NOTE — CONSULTS
[facility]  Cardiology  Consult Note    Patient Name: Griselda Neely  MRN: [id number]  Admission Date: 10/26/2020  Hospital Length of Stay: 3 days  Code Status: Partial Code   Attending Provider: Daren Lea MD   Consulting Provider: Katharina Powell MD  Primary Care Physician: Kalie Neely MD  Principal Problem:Atrial fibrillation with rapid ventricular response    Patient information was obtained from patient, past medical records and ER records.     Consults  Subjective:     REASON FOR CONSULT:  Atrial fibrillation.      HPI:  75-year-old female with a past medical history significant for congestive heart failure, left atrial appendage thrombus, atrial fibrillation, end-stage renal disease on hemodialysis, calciphylaxis presented to the hospital initially on the Sunday after she reportedly was confused.  She was sent back home however she still was confused and brought into the emergency room for further evaluation and management.  She was noted to have temperature of 101°.  Currently at the time of my interview she is alert, oriented and does not voice any complaints.  She could not remember any events from Sunday to Tuesday.  She denies any chest pain.  Her shortness of breath is at baseline.  She denies any lightheadedness, dizziness or any syncopal episodes.  She reports pain in the left big toe.    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    both legs    CHF (congestive heart failure)     Encounter for blood transfusion 03/2016    Gout     Hemodialysis access, AV graft     Hypertension     Mitral valve regurgitation     Osteoarthritis     Pancreatitis     Peripheral vascular disease     Pneumonia 09/09/2017    Renal failure        Past Surgical History:   Procedure Laterality Date    ACHILLES TENDON SURGERY Right     ANGIOGRAPHY OF ARTERIOVENOUS SHUNT Right 1/7/2020    Procedure: Fistulogram with  Possible Intervention;  Surgeon: Joseph Loyola MD;  Location: [facility] CATH/EP LAB;  Service: Cardiology;  Laterality: Right;    ANGIOGRAPHY OF LOWER EXTREMITY Left 3/18/2020    Procedure: Angiogram Extremity Unilateral;  Surgeon: Ali Khoobehi, MD;  Location: [facility] CATH/EP LAB;  Service: Cardiology;  Laterality: Left;    APPENDECTOMY      CARDIAC CATHETERIZATION  07/03/2017    CHOLECYSTECTOMY      COLONOSCOPY Left 10/4/2020    Procedure: COLONOSCOPY;  Surgeon: Jordan Kilpatrick MD;  Location: [facility] ENDO;  Service: Endoscopy;  Laterality: Left;    ESOPHAGOGASTRODUODENOSCOPY Left 8/17/2020    Procedure: EGD (ESOPHAGOGASTRODUODENOSCOPY);  Surgeon: Talat Trevizo MD;  Location: [facility] ENDO;  Service: Endoscopy;  Laterality: Left;    ESOPHAGOGASTRODUODENOSCOPY Left 10/2/2020    Procedure: EGD (ESOPHAGOGASTRODUODENOSCOPY);  Surgeon: Talat Trevizo MD;  Location: [facility] ENDO;  Service: Endoscopy;  Laterality: Left;    heal surgery Right     HYSTERECTOMY      INSERTION OF STENT INTO PERIPHERAL VESSEL N/A 1/7/2020    Procedure: INSERTION, STENT, VESSEL, PERIPHERAL;  Surgeon: Joseph Loyola MD;  Location: [facility] CATH/EP LAB;  Service: Cardiology;  Laterality: N/A;    MITRAL VALVE REPLACEMENT      PARATHYROIDECTOMY      PARATHYROIDECTOMY  07/13/2017    PERCUTANEOUS TRANSLUMINAL ANGIOPLASTY OF ARTERIOVENOUS FISTULA N/A 1/7/2020    Procedure: PTA, AV FISTULA;  Surgeon: Joseph Loyola MD;  Location: [facility] CATH/EP LAB;  Service: Cardiology;  Laterality: N/A;    PERITONEAL CATHETER INSERTION      RENAL BIOPSY      vocal cord nodule      WOUND DEBRIDEMENT Left 7/13/2020    Procedure: DEBRIDEMENT, WOUND;  Surgeon: Carlos Elam III, MD;  Location: [facility] OR;  Service: General;  Laterality: Left;       Review of patient's allergies indicates:  No Known Allergies    No current facility-administered medications on file prior to encounter.      Current Outpatient Medications on File Prior to Encounter   Medication Sig     aspirin (ECOTRIN) 81 MG EC tablet Take 1 tablet (81 mg total) by mouth once daily.    calcium acetate (PHOSLO) 667 mg capsule Take 667 mg by mouth once daily.     diltiaZEM (CARDIZEM CD) 180 MG 24 hr capsule Take 180 mg by mouth once daily.    folic acid/vit B complex and C (JENNIFER-KODAK ORAL) Take 1 tablet by mouth once daily.    isosorbide mononitrate (IMDUR) 30 MG 24 hr tablet Take 30 mg by mouth once daily.    losartan (COZAAR) 25 MG tablet Take 25 mg by mouth once daily.    magnesium oxide (MAG-OX) 400 mg (241.3 mg magnesium) tablet Take 1 tablet by mouth once daily (Patient taking differently: Take 400 mg by mouth once daily. )    metoprolol succinate (TOPROL-XL) 25 MG 24 hr tablet Take 1 tablet (25 mg total) by mouth nightly.    ondansetron (ZOFRAN-ODT) 4 MG TbDL Take 4 mg by mouth every 6 (six) hours as needed.    oxyCODONE-acetaminophen (PERCOCET) 5-325 mg per tablet Take 1 tablet by mouth every 6 (six) hours as needed for Pain.    pantoprazole (PROTONIX) 40 MG tablet Take 1 tablet (40 mg total) by mouth once daily.    sevelamer carbonate (RENVELA) 800 mg Tab Take 1 tablet by mouth 3 (three) times daily with meals.     warfarin (COUMADIN) 2.5 MG tablet Take 2.5 mg by mouth once daily.    zinc sulfate 220 mg Tab tablet Take 220 mg by mouth every other day.    heparin sodium,porcine (HEPARIN, PORCINE,) 1,000 unit/mL injection 1,000 Units.     wheelchair An 1 Device by Misc.(Non-Drug; Combo Route) route as needed (needed for transport).       Scheduled Meds:   calcium acetate(phosphat bind)  667 mg Oral Daily    diltiaZEM  30 mg Oral Q6H    isosorbide mononitrate  30 mg Oral Daily    levalbuterol  1.25 mg Nebulization TID WAKE    magnesium oxide  400 mg Oral Daily    metoprolol succinate  25 mg Oral Nightly    pantoprazole  40 mg Oral Daily    piperacillin-tazobactam (ZOSYN) IVPB  3.375 g Intravenous Q12H    senna-docusate 8.6-50 mg  1 tablet Oral BID     Continuous Infusions:    dilTIAZem Stopped (10/28/20 0730)     PRN Meds:.acetaminophen, melatonin, ondansetron, polyethylene glycol, sodium chloride 0.9%, Pharmacy to dose Vancomycin consult **AND** vancomycin - pharmacy to dose    Family History     Problem Relation (Age of Onset)    Arthritis Mother    Diabetes Mother, Father    Early death Sister, Sister    Heart disease Sister, Maternal Grandfather, Mother    Heart failure Mother    Hypertension Mother          Tobacco Use    Smoking status: Current Some Day Smoker     Packs/day: 0.50     Years: 45.00     Pack years: 22.50     Types: Cigarettes    Smokeless tobacco: Never Used   Substance and Sexual Activity    Alcohol use: No    Drug use: No    Sexual activity: Not on file       ROS   No significant headaches or sore throat or runny nose.   No recent changes in vision.   No recent changes in hearing.  No dysphagia or odynophagia.  Reports shortness of breath at baseline.   Denies any cough or hemoptysis.   Denies any abdominal pain, nausea, vomiting, diarrhea or constipation.   Denies any dysuria or polyuria.   Denies any fevers or chills.   Denies any recent significant weight changes.   Denies bleeding diathesis    Objective:     Vital Signs (Most Recent):  Temp: 97.8 °F (36.6 °C) (10/29/20 0720)  Pulse: 80 (10/29/20 0737)  Resp: 16 (10/29/20 0737)  BP: 105/75 (10/29/20 0720)  SpO2: 100 % (10/29/20 0737) Vital Signs (24h Range):  Temp:  [97.4 °F (36.3 °C)-98.5 °F (36.9 °C)] 97.8 °F (36.6 °C)  Pulse:  [] 80  Resp:  [15-30] 16  SpO2:  [94 %-100 %] 100 %  BP: ()/() 105/75     Weight: 59.1 kg (130 lb 4.7 oz)  Body mass index is 21.68 kg/m².    SpO2: 100 %  O2 Device (Oxygen Therapy): nasal cannula      Intake/Output Summary (Last 24 hours) at 10/29/2020 1038  Last data filed at 10/28/2020 1300  Gross per 24 hour   Intake 620 ml   Output 2500 ml   Net -1880 ml       Lines/Drains/Airways     Drain                 Hemodialysis AV Fistula 08/08/19 0640 Right upper arm  448 days          Peripheral Intravenous Line                 Peripheral IV - Single Lumen 10/26/20 1900 20 G Anterior;Distal;Left Forearm 2 days                Physical Exam  HEENT: Normocephalic, atraumatic, PERRL, Conjunctiva pink, no scleral icterus.   CVS: S1S2+, iRRR, no murmurs, rubs or gallops, JVP: Normal.  LUNGS: Clear  ABDOMEN: Soft, NT, BS+  EXTREMITIES: No cyanosis, clubbing or edema  NEURO: AAO X 3.       Significant Labs:   BMP:   Recent Labs   Lab 10/28/20  0517 10/29/20  0349   GLU 75 71   * 136   K 4.2 4.1   CL 91* 98   CO2 23 24   BUN 55* 35*   CREATININE 6.7* 4.9*   CALCIUM 7.5* 7.5*   MG 2.1 1.9   , CMP   Recent Labs   Lab 10/28/20  0517 10/29/20  0349   * 136   K 4.2 4.1   CL 91* 98   CO2 23 24   GLU 75 71   BUN 55* 35*   CREATININE 6.7* 4.9*   CALCIUM 7.5* 7.5*   ANIONGAP 19* 14   ESTGFRAFRICA 6.4* 9.4*   EGFRNONAA 5.6* 8.1*   , CBC   Recent Labs   Lab 10/28/20  0518   WBC 8.07   HGB 9.4*   HCT 31.7*      , INR   Recent Labs   Lab 10/28/20  0518 10/29/20  0349   INR 2.8 2.0   , Lipid Panel No results for input(s): CHOL, HDL, LDLCALC, TRIG, CHOLHDL in the last 48 hours. and Troponin No results for input(s): TROPONINI in the last 48 hours.    Significant Imaging: Reviewed  Assessment and Plan:     IMPRESSION:  Reported altered mental status on admission.  Resolved.    Fever.  COVID-19 negative.  Etiology?  Bronchitis.  Improved.    Atrial fibrillation.  Rapid ventricular response.  Rates better controlled today.    H/o GI bleed. S/p EGD - unremarkable.       Acute on chronic blood loss anemia. H&H better and stable.      Congestive heart failure. Reduced LV systolic function.  Volume overload on exam.     Mitral regurgitation.  Severe.  With severe calcification.  Not a candidate for percutaneous interventions.  After discussion with Dr. Lora, she decided to be treated only with medical management. Newly reduced LVEF to 35-40% on 4/11/2019. Status post mitral valve  replacement with a size 25 mm [employer] bovine pericardial prosthesis via right thoracotomy on by Dr. Lora 3/21/2019.      End-stage renal disease on hemodialysis.  MWF. Follows with Dr. Ingram.     Tobacco abuse.  Ongoing despite counseling.     Left atrial thrombus. Improved per last TRUPTI.      CODE STATUS was addressed on 10/1/2020 and the patient was alert, oriented ×3.  She preferred to be a DNR/DNI.  CODE STATUS was addressed in the presence of her sister and son.       RECOMMENDATIONS:  1.  In view of the altered mental status advised the patient to have a CT of the head done.  At this point in time she does not want any further testing she wants to be discharged home.  2.  It is okay from the Cardiology standpoint for the patient to be discharged.  Resume 180 mg of Cardizem CD at home.  3.  Continue her home medical regimen from the cardiac standpoint including Coumadin.  4.  Check INR on Monday.  5.  Discussed with case management to discuss with the patient about possible Medicaid application since the patient is not able to cope up with her bills at this point in time.  6.  Follow-up in the clinic in about 2-4 weeks or sooner if needed.    7.  Discussed with Dr. Lea.    Thank you for your consult. I will sign off. Please contact us if you have any additional questions.    Katharina Powell MD  Cardiology   [facility]

## 2020-10-29 NOTE — CARE UPDATE
10/28/20 2019   Patient Assessment/Suction   Level of Consciousness (AVPU) alert   Respiratory Effort Unlabored   Expansion/Accessory Muscles/Retractions no use of accessory muscles   All Lung Fields Breath Sounds clear;diminished   Rhythm/Pattern, Respiratory no shortness of breath reported   Cough Frequency no cough   PRE-TX-O2   O2 Device (Oxygen Therapy) nasal cannula   $ Is the patient on Low Flow Oxygen? Yes   SpO2 96 %   Pulse Oximetry Type Continuous   $ Pulse Oximetry - Multiple Charge Pulse Oximetry - Multiple   Pulse (!) 122   Resp 15   Positioning   Head of Bed (HOB) HOB elevated;HOB at 90 degrees   Aerosol Therapy   $ Aerosol Therapy Charges Aerosol Treatment   Daily Review of Necessity (SVN) completed   Respiratory Treatment Status (SVN) given   Treatment Route (SVN) mask   Patient Position (SVN) sitting in chair   Post Treatment Assessment (SVN) breath sounds improved   Signs of Intolerance (SVN) none   Breath Sounds Post-Respiratory Treatment   Throughout All Fields Post-Treatment All Fields   Throughout All Fields Post-Treatment aeration increased   Post-treatment Heart Rate (beats/min) 118   Post-treatment Resp Rate (breaths/min) 22   Respiratory Evaluation   $ Care Plan Tech Time 15 min

## 2020-10-29 NOTE — PROGRESS NOTES
Pharmacokinetic Assessment Follow Up: IV Vancomycin    Vancomycin serum concentration assessment(s):    The random level was drawn correctly and can be used to guide therapy at this time. The measurement is above the desired definitive target range of 10 to 15 mcg/mL.    Vancomycin Regimen Plan:    Re-dose when the random level is less than 15 mcg/mL, next level to be drawn at am labs on 10/31    Drug levels (last 3 results):  Recent Labs   Lab Result Units 10/27/20  2038 10/29/20  0349   Vancomycin, Random ug/mL 11.9 19.3       Pharmacy will continue to follow and monitor vancomycin.    Please contact pharmacy at extension [phone] for questions regarding this assessment.    Thank you for the consult,   Dario Sargent       Patient brief summary:  Griselda Neely is a 75 y.o. female initiated on antimicrobial therapy with IV Vancomycin for treatment of pneumonia     The patient's current regimen is intermittent dosing based on random levels    Drug Allergies:   Review of patient's allergies indicates:  No Known Allergies    Actual Body Weight:   59.1 kg    Renal Function:   Estimated Creatinine Clearance: 9.1 mL/min (A) (based on SCr of 4.9 mg/dL (H)).    Dialysis Method (if applicable):  intermittent HD MWF

## 2020-10-30 ENCOUNTER — TELEPHONE (OUTPATIENT)
Dept: FAMILY MEDICINE | Facility: CLINIC | Age: 75
End: 2020-10-30

## 2020-10-30 ENCOUNTER — TELEPHONE (OUTPATIENT)
Dept: CARDIOLOGY | Facility: CLINIC | Age: 75
End: 2020-10-30

## 2020-10-30 NOTE — TELEPHONE ENCOUNTER
----- Message from Clotilde Anthony sent at 10/30/2020  2:22 PM CDT -----  Regarding: next PTINR check  Please call Zoya with [facility] [phone]

## 2020-10-30 NOTE — DISCHARGE SUMMARY
[facility] Discharge Summary    Date of Admit: 10/26/2020  Date of Discharge: 10/29/2020    Discharge to: Home or Self Care  Condition: fair    Discharge Diagnoses     Problem Noted   Atrial Fibrillation With Rapid Ventricular Response 10/26/2020   Right Lower Lobe Pneumonia 10/26/2020   Encephalopathy Chronic 6/5/2020   Anemia Due to Chronic Kidney Disease 7/25/2017    Likely of chronic disease.      Other Persistent Atrial Fibrillation 7/18/2017   ESRD (end stage renal disease) on HD M,W, F 5/11/2016   Htn (Hypertension) 8/15/2013   Acute on chronic systolic heart failure (Resolved) 9/20/2020   Sepsis (Resolved) 9/7/2019   Altered Mental Status (Resolved) 8/12/2019   Disorientation (Resolved) 8/6/2019   Encephalopathy, metabolic  (Resolved) 8/6/2019   Acute Encephalopathy (Resolved) 8/6/2019   Hypoglycemia (Resolved) 8/6/2019   Elevated Lactic Acid Level (Resolved) 8/6/2019   Altered Mental Status (Resolved) 8/6/2019   Pneumonia Due to Infectious Organism (Resolved) 8/6/2019        Patient Active Problem List   Diagnosis    HTN (hypertension)    PND (paroxysmal nocturnal dyspnea)    Tobacco dependence    ESRD (end stage renal disease) on HD M,W, F    Hyperparathyroidism    Other persistent atrial fibrillation    Anemia due to chronic kidney disease    Pulmonary hypertension    Non-rheumatic mitral regurgitation    DNR (do not resuscitate)    Acute on chronic respiratory failure    Coronary arteriosclerosis in native artery    Gastroesophageal reflux disease    S/P parathyroidectomy    Tension-type headache    Hyperparathyroidism due to renal insufficiency    Functional dyspepsia    HLD (hyperlipidemia)    PVD (peripheral vascular disease)    Encephalopathy    Chronic systolic heart failure    Peripheral vascular disease now with left lower extremity occlusion    H/O mitral valve replacement    Gout    Anemia    Chronic pain on chronic narcotics    Left leg pain    Volume  overload    Calciphylaxis    Encephalopathy chronic    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    Malnutrition of moderate degree    Visit for wound check    Lower GI bleed    Atrial thrombus    Long term current use of anticoagulant    Poorly-controlled hypertension    Atrial fibrillation with rapid ventricular response    Right lower lobe pneumonia        Brief History of Present Illness      Griselda Neely is a 75 y.o. female who  has a past medical history of Anemia, Atrial fibrillation, Calciphylaxis (07/2017), CHF (congestive heart failure), Encounter for blood transfusion (03/2016), Gout, Hemodialysis access, AV graft, Hypertension, Mitral valve regurgitation, Osteoarthritis, Pancreatitis, Peripheral vascular disease, Pneumonia (09/09/2017), and Renal failure.  The patient presented to [facility] on 10/26/2020 with a primary complaint of Altered Mental Status  .       For the full HPI please refer to the History & Physical from this admission.    Hospital Course     Admitted with pneumonia and afib w/ RVR. Seen in conjunction with nephrology and cardiology. Treated with abx and cardizem gtt and responded well. Now transitioned to PO meds and feeling back to baseline. Wants to go home and refuses any further workup at this time.   Will d/c PO abx and cardiac med regimen as outlined by cardiology.   Return precautions provided.     Physical exam     Physical Exam  Constitutional:       General: She is not in acute distress.  HENT:      Head: Normocephalic and atraumatic.      Mouth/Throat:      Mouth: Mucous membranes are moist.      Pharynx: Oropharynx is clear.   Cardiovascular:      Rate and Rhythm: Normal rate. Rhythm irregular.   Pulmonary:      Effort: Pulmonary effort is normal. No respiratory distress.   Abdominal:      General: There is no distension.      Tenderness: There is no abdominal tenderness.   Musculoskeletal:         General: No swelling.    Neurological:      Mental Status: She is alert.   Psychiatric:         Mood and Affect: Mood normal.         Behavior: Behavior normal.           Discharge Medications        Medication List      START taking these medications    amoxicillin-clavulanate 250-125mg 250-125 mg Tab  Commonly known as: AUGMENTIN  Take 1 tablet by mouth every 12 (twelve) hours.        CONTINUE taking these medications    aspirin 81 MG EC tablet  Commonly known as: ECOTRIN  Take 1 tablet (81 mg total) by mouth once daily.     calcium acetate(phosphat bind) 667 mg capsule  Commonly known as: PHOSLO     diltiaZEM 180 MG 24 hr capsule  Commonly known as: CARDIZEM CD     isosorbide mononitrate 30 MG 24 hr tablet  Commonly known as: IMDUR     losartan 25 MG tablet  Commonly known as: COZAAR     magnesium oxide 400 mg (241.3 mg magnesium) tablet  Commonly known as: MAG-OX  Take 1 tablet by mouth once daily     metoprolol succinate 25 MG 24 hr tablet  Commonly known as: TOPROL-XL  Take 1 tablet (25 mg total) by mouth nightly.     ondansetron 4 MG Tbdl  Commonly known as: ZOFRAN-ODT     oxyCODONE-acetaminophen 5-325 mg per tablet  Commonly known as: PERCOCET     pantoprazole 40 MG tablet  Commonly known as: PROTONIX  Take 1 tablet (40 mg total) by mouth once daily.     JENNIFER-KODAK ORAL     warfarin 2.5 MG tablet  Commonly known as: COUMADIN     wheelchair An  1 Device by Misc.(Non-Drug; Combo Route) route as needed (needed for transport).     zinc sulfate 220 mg Tab tablet        STOP taking these medications    heparin (porcine) 1,000 unit/mL injection     RENVELA 800 mg Tab  Generic drug: sevelamer carbonate           Where to Get Your Medications      These medications were sent to [id number] [place] [address] [address]    Phone: [phone]   · amoxicillin-clavulanate 250-125mg 250-125 mg Tab         Instructions:  1. Take all medications as prescribed  2. Keep all follow-up  appointments  3. Return to the hospital or call your primary care physicians for any new, worsening, or recurrent symptoms.    Follow-Up:    Nephrology in 1 week  Cardiology in 1-2 week      Time spent on discharge totalled 40 minutes       Daren Lea MD  9:54 PM  10/29/2020

## 2020-10-31 LAB
BACTERIA BLD CULT: NORMAL
BACTERIA BLD CULT: NORMAL

## 2020-11-03 ENCOUNTER — DOCUMENT SCAN (OUTPATIENT)
Dept: HOME HEALTH SERVICES | Facility: HOSPITAL | Age: 75
End: 2020-11-03

## 2020-11-04 ENCOUNTER — HOSPITAL ENCOUNTER (INPATIENT)
Facility: HOSPITAL | Age: 75
LOS: 2 days | Discharge: HOME HEALTH | DRG: 871 | End: 2020-11-06
Attending: EMERGENCY MEDICINE | Admitting: INTERNAL MEDICINE
Payer: MEDICARE

## 2020-11-04 DIAGNOSIS — N39.0 URINARY TRACT INFECTION WITH HEMATURIA, SITE UNSPECIFIED: ICD-10-CM

## 2020-11-04 DIAGNOSIS — R53.81 PHYSICAL DECONDITIONING: ICD-10-CM

## 2020-11-04 DIAGNOSIS — A41.9 SEVERE SEPSIS: Primary | ICD-10-CM

## 2020-11-04 DIAGNOSIS — R41.82 AMS (ALTERED MENTAL STATUS): ICD-10-CM

## 2020-11-04 DIAGNOSIS — J18.9 PNEUMONIA DUE TO INFECTIOUS ORGANISM, UNSPECIFIED LATERALITY, UNSPECIFIED PART OF LUNG: ICD-10-CM

## 2020-11-04 DIAGNOSIS — G93.40 ENCEPHALOPATHY: ICD-10-CM

## 2020-11-04 DIAGNOSIS — R31.9 URINARY TRACT INFECTION WITH HEMATURIA, SITE UNSPECIFIED: ICD-10-CM

## 2020-11-04 DIAGNOSIS — R65.20 SEVERE SEPSIS: Primary | ICD-10-CM

## 2020-11-04 PROBLEM — N30.00 ACUTE CYSTITIS: Status: ACTIVE | Noted: 2020-11-04

## 2020-11-04 PROBLEM — E87.5 HYPERKALEMIA: Status: ACTIVE | Noted: 2020-11-04

## 2020-11-04 LAB
ALBUMIN SERPL BCP-MCNC: 2.8 G/DL (ref 3.5–5.2)
ALP SERPL-CCNC: 131 U/L (ref 55–135)
ALT SERPL W/O P-5'-P-CCNC: 16 U/L (ref 10–44)
ANION GAP SERPL CALC-SCNC: 15 MMOL/L (ref 8–16)
AST SERPL-CCNC: 34 U/L (ref 10–40)
BACTERIA #/AREA URNS HPF: NEGATIVE /HPF
BASOPHILS # BLD AUTO: 0.03 K/UL (ref 0–0.2)
BASOPHILS NFR BLD: 0.2 % (ref 0–1.9)
BILIRUB SERPL-MCNC: 1.3 MG/DL (ref 0.1–1)
BILIRUB UR QL STRIP: ABNORMAL
BNP SERPL-MCNC: >4500 PG/ML (ref 0–99)
BUN SERPL-MCNC: 40 MG/DL (ref 8–23)
CALCIUM SERPL-MCNC: 7.6 MG/DL (ref 8.7–10.5)
CHLORIDE SERPL-SCNC: 100 MMOL/L (ref 95–110)
CLARITY UR: ABNORMAL
CO2 SERPL-SCNC: 25 MMOL/L (ref 23–29)
COLOR UR: ABNORMAL
CREAT SERPL-MCNC: 6.9 MG/DL (ref 0.5–1.4)
DIFFERENTIAL METHOD: ABNORMAL
EOSINOPHIL # BLD AUTO: 0 K/UL (ref 0–0.5)
EOSINOPHIL NFR BLD: 0.2 % (ref 0–8)
ERYTHROCYTE [DISTWIDTH] IN BLOOD BY AUTOMATED COUNT: 18.3 % (ref 11.5–14.5)
EST. GFR  (AFRICAN AMERICAN): 6.2 ML/MIN/1.73 M^2
EST. GFR  (NON AFRICAN AMERICAN): 5.4 ML/MIN/1.73 M^2
GLUCOSE SERPL-MCNC: 85 MG/DL (ref 70–110)
GLUCOSE UR QL STRIP: NEGATIVE
HCT VFR BLD AUTO: 32.8 % (ref 37–48.5)
HGB BLD-MCNC: 9.9 G/DL (ref 12–16)
HGB UR QL STRIP: ABNORMAL
HYALINE CASTS #/AREA URNS LPF: 81 /LPF
IMM GRANULOCYTES # BLD AUTO: 0.12 K/UL (ref 0–0.04)
IMM GRANULOCYTES NFR BLD AUTO: 0.6 % (ref 0–0.5)
INFLUENZA A, MOLECULAR: NEGATIVE
INFLUENZA B, MOLECULAR: NEGATIVE
INR PPP: 1.8
KETONES UR QL STRIP: NEGATIVE
LACTATE SERPL-SCNC: 1.7 MMOL/L (ref 0.5–1.9)
LACTATE SERPL-SCNC: 1.7 MMOL/L (ref 0.5–1.9)
LACTATE SERPL-SCNC: 2 MMOL/L (ref 0.5–1.9)
LEUKOCYTE ESTERASE UR QL STRIP: ABNORMAL
LYMPHOCYTES # BLD AUTO: 0.8 K/UL (ref 1–4.8)
LYMPHOCYTES NFR BLD: 4.1 % (ref 18–48)
MAGNESIUM SERPL-MCNC: 1.6 MG/DL (ref 1.6–2.6)
MCH RBC QN AUTO: 27.7 PG (ref 27–31)
MCHC RBC AUTO-ENTMCNC: 30.2 G/DL (ref 32–36)
MCV RBC AUTO: 92 FL (ref 82–98)
MICROSCOPIC COMMENT: ABNORMAL
MONOCYTES # BLD AUTO: 0.3 K/UL (ref 0.3–1)
MONOCYTES NFR BLD: 1.8 % (ref 4–15)
NEUTROPHILS # BLD AUTO: 17.4 K/UL (ref 1.8–7.7)
NEUTROPHILS NFR BLD: 93.1 % (ref 38–73)
NITRITE UR QL STRIP: POSITIVE
NRBC BLD-RTO: 0 /100 WBC
PH UR STRIP: 7 [PH] (ref 5–8)
PLATELET # BLD AUTO: 369 K/UL (ref 150–350)
PMV BLD AUTO: 10.7 FL (ref 9.2–12.9)
POTASSIUM SERPL-SCNC: 5.7 MMOL/L (ref 3.5–5.1)
PROCALCITONIN SERPL IA-MCNC: 2.1 NG/ML (ref 0–0.5)
PROT SERPL-MCNC: 7.7 G/DL (ref 6–8.4)
PROT UR QL STRIP: ABNORMAL
PROTHROMBIN TIME: 19.7 SEC (ref 10.6–14.8)
RBC # BLD AUTO: 3.58 M/UL (ref 4–5.4)
RBC #/AREA URNS HPF: >100 /HPF (ref 0–4)
SARS-COV-2 RDRP RESP QL NAA+PROBE: NEGATIVE
SODIUM SERPL-SCNC: 140 MMOL/L (ref 136–145)
SP GR UR STRIP: 1.02 (ref 1–1.03)
SPECIMEN SOURCE: NORMAL
SQUAMOUS #/AREA URNS HPF: 39 /HPF
TROPONIN I SERPL DL<=0.01 NG/ML-MCNC: 0.05 NG/ML
TROPONIN I SERPL DL<=0.01 NG/ML-MCNC: 0.06 NG/ML
URN SPEC COLLECT METH UR: ABNORMAL
UROBILINOGEN UR STRIP-ACNC: 1 EU/DL
WBC # BLD AUTO: 18.7 K/UL (ref 3.9–12.7)
WBC #/AREA URNS HPF: >100 /HPF (ref 0–5)

## 2020-11-04 PROCEDURE — 90935 HEMODIALYSIS ONE EVALUATION: CPT

## 2020-11-04 PROCEDURE — 25000003 PHARM REV CODE 250: Performed by: INTERNAL MEDICINE

## 2020-11-04 PROCEDURE — 25000003 PHARM REV CODE 250: Performed by: EMERGENCY MEDICINE

## 2020-11-04 PROCEDURE — 87086 URINE CULTURE/COLONY COUNT: CPT

## 2020-11-04 PROCEDURE — 93010 ELECTROCARDIOGRAM REPORT: CPT | Mod: ,,, | Performed by: INTERNAL MEDICINE

## 2020-11-04 PROCEDURE — 25000003 PHARM REV CODE 250

## 2020-11-04 PROCEDURE — 83605 ASSAY OF LACTIC ACID: CPT

## 2020-11-04 PROCEDURE — 83735 ASSAY OF MAGNESIUM: CPT

## 2020-11-04 PROCEDURE — 87040 BLOOD CULTURE FOR BACTERIA: CPT | Mod: 59

## 2020-11-04 PROCEDURE — 81001 URINALYSIS AUTO W/SCOPE: CPT

## 2020-11-04 PROCEDURE — 96365 THER/PROPH/DIAG IV INF INIT: CPT | Mod: 59

## 2020-11-04 PROCEDURE — 12000002 HC ACUTE/MED SURGE SEMI-PRIVATE ROOM

## 2020-11-04 PROCEDURE — 25000003 PHARM REV CODE 250: Performed by: FAMILY MEDICINE

## 2020-11-04 PROCEDURE — 84484 ASSAY OF TROPONIN QUANT: CPT | Mod: 91

## 2020-11-04 PROCEDURE — 83605 ASSAY OF LACTIC ACID: CPT | Mod: 91

## 2020-11-04 PROCEDURE — 87502 INFLUENZA DNA AMP PROBE: CPT

## 2020-11-04 PROCEDURE — 63600175 PHARM REV CODE 636 W HCPCS: Performed by: EMERGENCY MEDICINE

## 2020-11-04 PROCEDURE — 27000221 HC OXYGEN, UP TO 24 HOURS

## 2020-11-04 PROCEDURE — 85025 COMPLETE CBC W/AUTO DIFF WBC: CPT

## 2020-11-04 PROCEDURE — 99291 CRITICAL CARE FIRST HOUR: CPT

## 2020-11-04 PROCEDURE — 63600175 PHARM REV CODE 636 W HCPCS: Performed by: INTERNAL MEDICINE

## 2020-11-04 PROCEDURE — 84145 PROCALCITONIN (PCT): CPT

## 2020-11-04 PROCEDURE — 36415 COLL VENOUS BLD VENIPUNCTURE: CPT

## 2020-11-04 PROCEDURE — 96368 THER/DIAG CONCURRENT INF: CPT

## 2020-11-04 PROCEDURE — 84484 ASSAY OF TROPONIN QUANT: CPT

## 2020-11-04 PROCEDURE — 83880 ASSAY OF NATRIURETIC PEPTIDE: CPT

## 2020-11-04 PROCEDURE — 93010 EKG 12-LEAD: ICD-10-PCS | Mod: ,,, | Performed by: INTERNAL MEDICINE

## 2020-11-04 PROCEDURE — 93005 ELECTROCARDIOGRAM TRACING: CPT | Performed by: INTERNAL MEDICINE

## 2020-11-04 PROCEDURE — 80053 COMPREHEN METABOLIC PANEL: CPT

## 2020-11-04 PROCEDURE — 99900035 HC TECH TIME PER 15 MIN (STAT)

## 2020-11-04 PROCEDURE — 94761 N-INVAS EAR/PLS OXIMETRY MLT: CPT

## 2020-11-04 PROCEDURE — 85610 PROTHROMBIN TIME: CPT

## 2020-11-04 PROCEDURE — U0002 COVID-19 LAB TEST NON-CDC: HCPCS

## 2020-11-04 RX ORDER — LANOLIN ALCOHOL/MO/W.PET/CERES
800 CREAM (GRAM) TOPICAL
Status: DISCONTINUED | OUTPATIENT
Start: 2020-11-04 | End: 2020-11-06 | Stop reason: HOSPADM

## 2020-11-04 RX ORDER — WARFARIN 2.5 MG/1
2.5 TABLET ORAL DAILY
Status: DISCONTINUED | OUTPATIENT
Start: 2020-11-04 | End: 2020-11-06 | Stop reason: HOSPADM

## 2020-11-04 RX ORDER — VANCOMYCIN HCL IN 5 % DEXTROSE 1G/250ML
1000 PLASTIC BAG, INJECTION (ML) INTRAVENOUS ONCE
Status: COMPLETED | OUTPATIENT
Start: 2020-11-04 | End: 2020-11-04

## 2020-11-04 RX ORDER — POLYETHYLENE GLYCOL 3350 17 G/17G
17 POWDER, FOR SOLUTION ORAL 2 TIMES DAILY PRN
Status: DISCONTINUED | OUTPATIENT
Start: 2020-11-04 | End: 2020-11-06 | Stop reason: HOSPADM

## 2020-11-04 RX ORDER — ASPIRIN 81 MG/1
81 TABLET ORAL DAILY
Status: DISCONTINUED | OUTPATIENT
Start: 2020-11-04 | End: 2020-11-06 | Stop reason: HOSPADM

## 2020-11-04 RX ORDER — MAGNESIUM SULFATE HEPTAHYDRATE 40 MG/ML
4 INJECTION, SOLUTION INTRAVENOUS
Status: DISCONTINUED | OUTPATIENT
Start: 2020-11-04 | End: 2020-11-06 | Stop reason: HOSPADM

## 2020-11-04 RX ORDER — LANOLIN ALCOHOL/MO/W.PET/CERES
400 CREAM (GRAM) TOPICAL DAILY
Status: DISCONTINUED | OUTPATIENT
Start: 2020-11-04 | End: 2020-11-06 | Stop reason: HOSPADM

## 2020-11-04 RX ORDER — POTASSIUM CHLORIDE 7.45 MG/ML
20 INJECTION INTRAVENOUS
Status: DISCONTINUED | OUTPATIENT
Start: 2020-11-04 | End: 2020-11-06 | Stop reason: HOSPADM

## 2020-11-04 RX ORDER — IPRATROPIUM BROMIDE 0.5 MG/2.5ML
0.5 SOLUTION RESPIRATORY (INHALATION) EVERY 6 HOURS PRN
Status: DISCONTINUED | OUTPATIENT
Start: 2020-11-04 | End: 2020-11-06 | Stop reason: HOSPADM

## 2020-11-04 RX ORDER — MAGNESIUM SULFATE HEPTAHYDRATE 40 MG/ML
2 INJECTION, SOLUTION INTRAVENOUS
Status: DISCONTINUED | OUTPATIENT
Start: 2020-11-04 | End: 2020-11-06 | Stop reason: HOSPADM

## 2020-11-04 RX ORDER — CALCIUM ACETATE 667 MG/1
667 CAPSULE ORAL DAILY
Status: DISCONTINUED | OUTPATIENT
Start: 2020-11-04 | End: 2020-11-06 | Stop reason: HOSPADM

## 2020-11-04 RX ORDER — CHLORHEXIDINE GLUCONATE ORAL RINSE 1.2 MG/ML
15 SOLUTION DENTAL 2 TIMES DAILY
Status: DISCONTINUED | OUTPATIENT
Start: 2020-11-04 | End: 2020-11-06 | Stop reason: HOSPADM

## 2020-11-04 RX ORDER — PANTOPRAZOLE SODIUM 40 MG/1
40 TABLET, DELAYED RELEASE ORAL
Status: DISCONTINUED | OUTPATIENT
Start: 2020-11-04 | End: 2020-11-06 | Stop reason: HOSPADM

## 2020-11-04 RX ORDER — LOSARTAN POTASSIUM 25 MG/1
25 TABLET ORAL DAILY
Status: DISCONTINUED | OUTPATIENT
Start: 2020-11-04 | End: 2020-11-06 | Stop reason: HOSPADM

## 2020-11-04 RX ORDER — POTASSIUM CHLORIDE 20 MEQ/1
20 TABLET, EXTENDED RELEASE ORAL
Status: DISCONTINUED | OUTPATIENT
Start: 2020-11-04 | End: 2020-11-06 | Stop reason: HOSPADM

## 2020-11-04 RX ORDER — POTASSIUM CHLORIDE 7.45 MG/ML
40 INJECTION INTRAVENOUS
Status: DISCONTINUED | OUTPATIENT
Start: 2020-11-04 | End: 2020-11-06 | Stop reason: HOSPADM

## 2020-11-04 RX ORDER — TALC
6 POWDER (GRAM) TOPICAL NIGHTLY PRN
Status: DISCONTINUED | OUTPATIENT
Start: 2020-11-04 | End: 2020-11-06 | Stop reason: HOSPADM

## 2020-11-04 RX ORDER — ONDANSETRON 2 MG/ML
4 INJECTION INTRAMUSCULAR; INTRAVENOUS EVERY 6 HOURS PRN
Status: DISCONTINUED | OUTPATIENT
Start: 2020-11-04 | End: 2020-11-06 | Stop reason: HOSPADM

## 2020-11-04 RX ORDER — MAGNESIUM SULFATE 1 G/100ML
1 INJECTION INTRAVENOUS
Status: DISCONTINUED | OUTPATIENT
Start: 2020-11-04 | End: 2020-11-06 | Stop reason: HOSPADM

## 2020-11-04 RX ORDER — LEVALBUTEROL INHALATION SOLUTION 0.63 MG/3ML
0.63 SOLUTION RESPIRATORY (INHALATION) EVERY 6 HOURS PRN
Status: DISCONTINUED | OUTPATIENT
Start: 2020-11-04 | End: 2020-11-06 | Stop reason: HOSPADM

## 2020-11-04 RX ORDER — METOPROLOL SUCCINATE 25 MG/1
25 TABLET, EXTENDED RELEASE ORAL NIGHTLY
Status: DISCONTINUED | OUTPATIENT
Start: 2020-11-04 | End: 2020-11-06 | Stop reason: HOSPADM

## 2020-11-04 RX ORDER — SODIUM THIOSULFATE 250 MG/ML
25 INJECTION, SOLUTION INTRAVENOUS
Status: DISCONTINUED | OUTPATIENT
Start: 2020-11-04 | End: 2020-11-06 | Stop reason: HOSPADM

## 2020-11-04 RX ORDER — SODIUM CHLORIDE 9 MG/ML
INJECTION, SOLUTION INTRAVENOUS
Status: DISCONTINUED | OUTPATIENT
Start: 2020-11-04 | End: 2020-11-06 | Stop reason: HOSPADM

## 2020-11-04 RX ORDER — SODIUM THIOSULFATE 250 MG/ML
12.5 INJECTION, SOLUTION INTRAVENOUS
Status: DISCONTINUED | OUTPATIENT
Start: 2020-11-04 | End: 2020-11-04

## 2020-11-04 RX ORDER — SODIUM CHLORIDE 0.9 % (FLUSH) 0.9 %
10 SYRINGE (ML) INJECTION
Status: DISCONTINUED | OUTPATIENT
Start: 2020-11-04 | End: 2020-11-06 | Stop reason: HOSPADM

## 2020-11-04 RX ORDER — POTASSIUM CHLORIDE 20 MEQ/1
40 TABLET, EXTENDED RELEASE ORAL
Status: DISCONTINUED | OUTPATIENT
Start: 2020-11-04 | End: 2020-11-06 | Stop reason: HOSPADM

## 2020-11-04 RX ORDER — DILTIAZEM HYDROCHLORIDE 180 MG/1
180 CAPSULE, COATED, EXTENDED RELEASE ORAL DAILY
Status: DISCONTINUED | OUTPATIENT
Start: 2020-11-04 | End: 2020-11-06 | Stop reason: HOSPADM

## 2020-11-04 RX ORDER — ACETAMINOPHEN 10 MG/ML
1000 INJECTION, SOLUTION INTRAVENOUS
Status: COMPLETED | OUTPATIENT
Start: 2020-11-04 | End: 2020-11-04

## 2020-11-04 RX ORDER — ISOSORBIDE MONONITRATE 30 MG/1
30 TABLET, EXTENDED RELEASE ORAL DAILY
Status: DISCONTINUED | OUTPATIENT
Start: 2020-11-04 | End: 2020-11-06 | Stop reason: HOSPADM

## 2020-11-04 RX ORDER — MUPIROCIN 20 MG/G
OINTMENT TOPICAL 2 TIMES DAILY
Status: DISCONTINUED | OUTPATIENT
Start: 2020-11-04 | End: 2020-11-06 | Stop reason: HOSPADM

## 2020-11-04 RX ORDER — ACETAMINOPHEN 325 MG/1
650 TABLET ORAL EVERY 4 HOURS PRN
Status: DISCONTINUED | OUTPATIENT
Start: 2020-11-04 | End: 2020-11-06 | Stop reason: HOSPADM

## 2020-11-04 RX ORDER — LEVALBUTEROL INHALATION SOLUTION 0.63 MG/3ML
0.63 SOLUTION RESPIRATORY (INHALATION)
Status: DISCONTINUED | OUTPATIENT
Start: 2020-11-04 | End: 2020-11-04

## 2020-11-04 RX ORDER — SODIUM CHLORIDE 9 MG/ML
INJECTION, SOLUTION INTRAVENOUS ONCE
Status: DISCONTINUED | OUTPATIENT
Start: 2020-11-04 | End: 2020-11-06 | Stop reason: HOSPADM

## 2020-11-04 RX ORDER — INDOMETHACIN 25 MG/1
50 CAPSULE ORAL
Status: COMPLETED | OUTPATIENT
Start: 2020-11-04 | End: 2020-11-04

## 2020-11-04 RX ADMIN — METOPROLOL SUCCINATE 25 MG: 25 TABLET, FILM COATED, EXTENDED RELEASE ORAL at 08:11

## 2020-11-04 RX ADMIN — MUPIROCIN: 20 OINTMENT TOPICAL at 02:11

## 2020-11-04 RX ADMIN — MAGNESIUM OXIDE 400 MG: 400 TABLET ORAL at 12:11

## 2020-11-04 RX ADMIN — PIPERACILLIN SODIUM AND TAZOBACTAM SODIUM 3.38 G: 3; .375 INJECTION, POWDER, LYOPHILIZED, FOR SOLUTION INTRAVENOUS at 02:11

## 2020-11-04 RX ADMIN — VANCOMYCIN HYDROCHLORIDE 1000 MG: 1 INJECTION, POWDER, LYOPHILIZED, FOR SOLUTION INTRAVENOUS at 03:11

## 2020-11-04 RX ADMIN — DILTIAZEM HYDROCHLORIDE 180 MG: 180 CAPSULE, COATED, EXTENDED RELEASE ORAL at 08:11

## 2020-11-04 RX ADMIN — ACETAMINOPHEN 650 MG: 325 TABLET, FILM COATED ORAL at 04:11

## 2020-11-04 RX ADMIN — ISOSORBIDE MONONITRATE 30 MG: 30 TABLET, EXTENDED RELEASE ORAL at 12:11

## 2020-11-04 RX ADMIN — PANTOPRAZOLE SODIUM 40 MG: 40 TABLET, DELAYED RELEASE ORAL at 06:11

## 2020-11-04 RX ADMIN — CHLORHEXIDINE GLUCONATE 15 ML: 1.2 RINSE ORAL at 02:11

## 2020-11-04 RX ADMIN — ASPIRIN 81 MG: 81 TABLET, DELAYED RELEASE ORAL at 12:11

## 2020-11-04 RX ADMIN — LOSARTAN POTASSIUM 25 MG: 25 TABLET, FILM COATED ORAL at 12:11

## 2020-11-04 RX ADMIN — WARFARIN SODIUM 2.5 MG: 2.5 TABLET ORAL at 05:11

## 2020-11-04 RX ADMIN — SODIUM BICARBONATE 50 MEQ: 84 INJECTION, SOLUTION INTRAVENOUS at 03:11

## 2020-11-04 RX ADMIN — ACETAMINOPHEN 1000 MG: 10 INJECTION, SOLUTION INTRAVENOUS at 03:11

## 2020-11-04 RX ADMIN — SODIUM THIOSULFATE 25 G: 250 INJECTION, SOLUTION INTRAVENOUS at 11:11

## 2020-11-04 RX ADMIN — PIPERACILLIN SODIUM,TAZOBACTAM SODIUM 3.38 G: 3; .375 INJECTION, POWDER, FOR SOLUTION INTRAVENOUS at 03:11

## 2020-11-04 NOTE — ASSESSMENT & PLAN NOTE
Patient meets sepsis criteria with possible source including pneumonia and UTI  CXR with persistent infiltrate but unclear to me if this is an active infection versus continued radiographic evidence of previous pneumonia treated 10/26-10/29.  Sputum, BCx ordered. IV abx.  Speech eval ordered to evaluate for possible aspiration if this is a recurrence.  Lung exam with bilateral rhonchi.  UA indicative of cough but given that she does not make urine, not sure if this is just colonization of bladder versus true infection. Broad coverage IV abx.   Fever in the ED.  Procal is 2 which is actually improved from 18 10/26/20 per my chart review. Encephalopathy and HR improved with improvement in temperature.   Review of skin did not indicate a soft tissue infection  Will need to closely monitor to see if she is trying to declare a source other than pneumonia or possible UTI  IVFs not ordered due to ESRD and Chronic HF with BNP 4500. Not hypotensive.  Repeating Lactic acid.

## 2020-11-04 NOTE — CARE UPDATE
This note also relates to the following rows which could not be included:  SpO2 - Cannot attach notes to unvalidated device data  Pulse - Cannot attach notes to unvalidated device data  Resp - Cannot attach notes to unvalidated device data       11/04/20 1015   Patient Assessment/Suction   Level of Consciousness (AVPU) alert   Respiratory Effort Normal;Unlabored   Expansion/Accessory Muscles/Retractions no use of accessory muscles   All Lung Fields Breath Sounds diminished;coarse  (ON DIALYSIS)   Rhythm/Pattern, Respiratory unlabored;pattern regular;depth regular   PRE-TX-O2   O2 Device (Oxygen Therapy) nasal cannula   $ Is the patient on Low Flow Oxygen? Yes   Flow (L/min) 3   Pulse Oximetry Type Continuous   $ Pulse Oximetry - Multiple Charge Pulse Oximetry - Multiple   Aerosol Therapy   $ Aerosol Therapy Charges PRN treatment not required   Respiratory Evaluation   $ Care Plan Tech Time 15 min

## 2020-11-04 NOTE — HPI
75 year old female with PMHx ESRD on HD, parox Afib on Coumadin, Chronic HF, PVD presents to the ED with encephalopathy.  Patient has no memory of tonight's events.  History supplemented by ED MD and chart.  Patient recently admitted with fever and encephalopathy 10/26.  CXR revealing of pnuemonia.  She was also treated for Afib with RVR during that hospital stay.  Patient discharged 10/29 after declining further treatment and was discharged on Augmentin.  Patient reports she took Augmentin as prescribed and had been feeling well at home other than a cough with clear sputum. No fevers or chills.  Per ED MD, patient's family noted she went to bed in her usual state of health but was later found on the floor confused at approximately 0200.  In the ED she was noted to have a 103 fever and was in Afib with RVR.  Rate has improved with improvement in temperature. RN reports patient quite confused on presentation but is now improved from a mentation standpoint.  She is able to answer my orientation questions of name, location, year though initially tells me she already answered these questions. She does not recall being confused or being found on the floor.  In the ED, breath sounds are quite rhonchus. She reports she does not really make urine any more at home.

## 2020-11-04 NOTE — PT/OT/SLP PROGRESS
Speech Language Pathology      Griselda Neely  MRN: [id number]    Patient not seen today secondary to Patient unwilling to participate.   Patient in fetal position in light resting state. Initially agreeable to evaluation. Communicated w/ caregiver, Aide who reported occasional coughing with meals. Oxygen compliance & appropriate positioning during and following intake reported. Reinforced oral hygiene in decolonization of oral bacteria and prevention of aspiration related complication. Pt currently wears dentures w/out any oral care. Following brief communication with caregiver, Pt then adamantly refusing evaluation and crying out. With clinician quickly agreeable to return at later date and de escalation, Pt continued to cry out refusing evaluation.     Codey Glover, CCC-SLP

## 2020-11-04 NOTE — PROGRESS NOTES
Pharmacokinetic Initial Assessment: IV Vancomycin    Assessment/Plan:    Initiate intravenous vancomycin with loading dose of 1000 mg once with subsequent doses when random concentrations are less than 20 mcg/mL  Desired empiric serum trough concentration is 10 to 15 mcg/mL  Draw vancomycin random level on 11/05 at 0200.  Pharmacy will continue to follow and monitor vancomycin.      Please contact pharmacy at extension [phone] with any questions regarding this assessment.     Thank you for the consult,   Baudilio Downs       Patient brief summary:  Griselda Neely is a 75 y.o. female initiated on antimicrobial therapy with IV Vancomycin for treatment of suspected lower respiratory infection    Drug Allergies:   Review of patient's allergies indicates:  No Known Allergies    Actual Body Weight:   60.8 kg    Renal Function:   Estimated Creatinine Clearance: 6.4 mL/min (A) (based on SCr of 6.9 mg/dL (H)).,     CBC (last 72 hours):  Recent Labs   Lab Result Units 11/04/20  0249   WBC K/uL 18.70*   Hemoglobin g/dL 9.9*   Hematocrit % 32.8*   Platelets K/uL 369*   Gran % % 93.1*   Lymph % % 4.1*   Mono % % 1.8*   Eosinophil % % 0.2   Basophil % % 0.2   Differential Method  Automated       Metabolic Panel (last 72 hours):  Recent Labs   Lab Result Units 11/04/20  0249 11/04/20  0307   Sodium mmol/L 140  --    Potassium mmol/L 5.7*  --    Chloride mmol/L 100  --    CO2 mmol/L 25  --    Glucose mg/dL 85  --    Glucose, UA   --  Negative   BUN mg/dL 40*  --    Creatinine mg/dL 6.9*  --    Albumin g/dL 2.8*  --    Total Bilirubin mg/dL 1.3*  --    Alkaline Phosphatase U/L 131  --    AST U/L 34  --    ALT U/L 16  --        Drug levels (last 3 results):  No results for input(s): VANCOMYCINRA, VANCOMYCINPE, VANCOMYCINTR in the last 72 hours.    Microbiologic Results:  Microbiology Results (last 7 days)       Procedure Component Value Units Date/Time    Culture, Respiratory with Gram Stain [[id number]]     Order Status: No result  Specimen: Respiratory     Urine culture [[id number]] Collected: 11/04/20 0307    Order Status: No result Specimen: Urine Updated: 11/04/20 0338    Blood culture x two cultures. Draw prior to antibiotics. [[id number]] Collected: 11/04/20 0245    Order Status: Sent Specimen: Blood from Peripheral, Forearm, Left Updated: 11/04/20 0316    Blood culture x two cultures. Draw prior to antibiotics. [[id number]] Collected: 11/04/20 0245    Order Status: Sent Specimen: Blood from Peripheral, Antecubital, Left Updated: 11/04/20 0316

## 2020-11-04 NOTE — ASSESSMENT & PLAN NOTE
Suspect due to infection and fever.  See sepsis for plan.  Head CT done in the ED and no acute process.  No focal deficits on my exam.

## 2020-11-04 NOTE — SUBJECTIVE & OBJECTIVE
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    both legs    CHF (congestive heart failure)     Encounter for blood transfusion 03/2016    Gout     Hemodialysis access, AV graft     Hypertension     Mitral valve regurgitation     Osteoarthritis     Pancreatitis     Peripheral vascular disease     Pneumonia 09/09/2017    Renal failure        Past Surgical History:   Procedure Laterality Date    ACHILLES TENDON SURGERY Right     ANGIOGRAPHY OF ARTERIOVENOUS SHUNT Right 1/7/2020    Procedure: Fistulogram with Possible Intervention;  Surgeon: Joseph Loyola MD;  Location: [facility] CATH/EP LAB;  Service: Cardiology;  Laterality: Right;    ANGIOGRAPHY OF LOWER EXTREMITY Left 3/18/2020    Procedure: Angiogram Extremity Unilateral;  Surgeon: Ali Khoobehi, MD;  Location: [facility] CATH/EP LAB;  Service: Cardiology;  Laterality: Left;    APPENDECTOMY      CARDIAC CATHETERIZATION  07/03/2017    CHOLECYSTECTOMY      COLONOSCOPY Left 10/4/2020    Procedure: COLONOSCOPY;  Surgeon: Jordan Kilpatrick MD;  Location: [facility] ENDO;  Service: Endoscopy;  Laterality: Left;    ESOPHAGOGASTRODUODENOSCOPY Left 8/17/2020    Procedure: EGD (ESOPHAGOGASTRODUODENOSCOPY);  Surgeon: Talat Trevizo MD;  Location: [facility] ENDO;  Service: Endoscopy;  Laterality: Left;    ESOPHAGOGASTRODUODENOSCOPY Left 10/2/2020    Procedure: EGD (ESOPHAGOGASTRODUODENOSCOPY);  Surgeon: Talat Trevizo MD;  Location: [facility] ENDO;  Service: Endoscopy;  Laterality: Left;    heal surgery Right     HYSTERECTOMY      INSERTION OF STENT INTO PERIPHERAL VESSEL N/A 1/7/2020    Procedure: INSERTION, STENT, VESSEL, PERIPHERAL;  Surgeon: Joseph Loyola MD;  Location: [facility] CATH/EP LAB;  Service: Cardiology;  Laterality: N/A;    MITRAL VALVE REPLACEMENT      PARATHYROIDECTOMY      PARATHYROIDECTOMY  07/13/2017    PERCUTANEOUS TRANSLUMINAL ANGIOPLASTY OF ARTERIOVENOUS FISTULA N/A 1/7/2020    Procedure:  PTA, AV FISTULA;  Surgeon: Joseph Loyola MD;  Location: [facility] CATH/EP LAB;  Service: Cardiology;  Laterality: N/A;    PERITONEAL CATHETER INSERTION      RENAL BIOPSY      vocal cord nodule      WOUND DEBRIDEMENT Left 7/13/2020    Procedure: DEBRIDEMENT, WOUND;  Surgeon: Carlos Elam III, MD;  Location: [facility] OR;  Service: General;  Laterality: Left;       Review of patient's allergies indicates:  No Known Allergies    No current facility-administered medications on file prior to encounter.      Current Outpatient Medications on File Prior to Encounter   Medication Sig    amoxicillin-clavulanate 250-125mg (AUGMENTIN) 250-125 mg Tab Take 1 tablet by mouth every 12 (twelve) hours.    aspirin (ECOTRIN) 81 MG EC tablet Take 1 tablet (81 mg total) by mouth once daily.    calcium acetate (PHOSLO) 667 mg capsule Take 667 mg by mouth once daily.     diltiaZEM (CARDIZEM CD) 180 MG 24 hr capsule Take 1 capsule by mouth once daily    folic acid/vit B complex and C (JENNIFER-KODAK ORAL) Take 1 tablet by mouth once daily.    isosorbide mononitrate (IMDUR) 30 MG 24 hr tablet Take 30 mg by mouth once daily.    losartan (COZAAR) 25 MG tablet Take 25 mg by mouth once daily.    magnesium oxide (MAG-OX) 400 mg (241.3 mg magnesium) tablet Take 1 tablet by mouth once daily (Patient taking differently: Take 400 mg by mouth once daily. )    metoprolol succinate (TOPROL-XL) 25 MG 24 hr tablet Take 1 tablet (25 mg total) by mouth nightly.    ondansetron (ZOFRAN-ODT) 4 MG TbDL Take 4 mg by mouth every 6 (six) hours as needed.    oxyCODONE-acetaminophen (PERCOCET) 5-325 mg per tablet Take 1 tablet by mouth every 6 (six) hours as needed for Pain.    pantoprazole (PROTONIX) 40 MG tablet Take 1 tablet (40 mg total) by mouth once daily.    warfarin (COUMADIN) 2.5 MG tablet Take 2.5 mg by mouth once daily.    wheelchair An 1 Device by Misc.(Non-Drug; Combo Route) route as needed (needed for transport).    zinc sulfate 220 mg  Tab tablet Take 220 mg by mouth every other day.     Family History     Problem Relation (Age of Onset)    Arthritis Mother    Diabetes Mother, Father    Early death Sister, Sister    Heart disease Sister, Maternal Grandfather, Mother    Heart failure Mother    Hypertension Mother        Tobacco Use    Smoking status: Current Some Day Smoker     Packs/day: 0.50     Years: 45.00     Pack years: 22.50     Types: Cigarettes    Smokeless tobacco: Never Used   Substance and Sexual Activity    Alcohol use: No    Drug use: No    Sexual activity: Not Currently     Review of Systems   Constitutional: Positive for fever.   HENT: Negative.    Eyes: Negative.    Respiratory: Positive for cough and shortness of breath.    Cardiovascular: Negative.    Gastrointestinal: Negative.    Endocrine: Negative.    Genitourinary: Negative.    Musculoskeletal: Negative.    Skin: Negative.    Allergic/Immunologic: Negative.    Neurological: Negative.    Hematological: Negative.    Psychiatric/Behavioral: Positive for confusion.     Objective:     Vital Signs (Most Recent):  Temp: 97.7 °F (36.5 °C) (11/04/20 0524)  Pulse: 78 (11/04/20 0529)  Resp: (!) 23 (11/04/20 0529)  BP: 112/62 (11/04/20 0524)  SpO2: 95 % (11/04/20 0529) Vital Signs (24h Range):  Temp:  [97.7 °F (36.5 °C)-103.4 °F (39.7 °C)] 97.7 °F (36.5 °C)  Pulse:  [] 78  Resp:  [21-28] 23  SpO2:  [94 %-100 %] 95 %  BP: (112-142)/(55-81) 112/62     Weight: 60.8 kg (134 lb 0.6 oz)  Body mass index is 22.31 kg/m².    Physical Exam  Vitals signs and nursing note reviewed.   Constitutional:       General: She is not in acute distress.     Appearance: She is well-developed.      Comments: thin   HENT:      Head: Normocephalic and atraumatic.   Eyes:      Pupils: Pupils are equal, round, and reactive to light.   Neck:      Musculoskeletal: Normal range of motion.   Cardiovascular:      Heart sounds: No murmur.      Comments: Irregular   Pulmonary:      Effort: Pulmonary effort is  normal.      Breath sounds: Rhonchi present. No wheezing.      Comments: O2 per NC  Abdominal:      General: There is no distension.      Palpations: Abdomen is soft.      Tenderness: There is no abdominal tenderness. There is no guarding or rebound.   Musculoskeletal: Normal range of motion.   Skin:     Findings: No rash.   Neurological:      Mental Status: She is alert and oriented to person, place, and time.      Cranial Nerves: No cranial nerve deficit.      Sensory: No sensory deficit.           CRANIAL NERVES     CN III, IV, VI   Pupils are equal, round, and reactive to light.       Significant Labs:   CBC:   Recent Labs   Lab 11/04/20  0249   WBC 18.70*   HGB 9.9*   HCT 32.8*   *     CMP:   Recent Labs   Lab 11/04/20 0249      K 5.7*      CO2 25   GLU 85   BUN 40*   CREATININE 6.9*   CALCIUM 7.6*   PROT 7.7   ALBUMIN 2.8*   BILITOT 1.3*   ALKPHOS 131   AST 34   ALT 16   ANIONGAP 15   EGFRNONAA 5.4*     Lactic Acid:   Recent Labs   Lab 11/04/20  0246   LACTATE 2.0*     Troponin:   Recent Labs   Lab 11/04/20  0249   TROPONINI 0.057*     Urine Studies:   Recent Labs   Lab 11/04/20  0307   COLORU Brown*   APPEARANCEUA Cloudy*   PHUR 7.0   SPECGRAV 1.020   PROTEINUA 3+*   GLUCUA Negative   KETONESU Negative   BILIRUBINUA 2+*   OCCULTUA 3+*   NITRITE Positive*   UROBILINOGEN 1.0   LEUKOCYTESUR 2+*   RBCUA >100*   WBCUA >100*   BACTERIA Negative   SQUAMEPITHEL 39   HYALINECASTS 81*       Significant Imaging: CXR: I have reviewed all pertinent results/findings within the past 24 hours and my personal findings are:  Persistent right lung infiltrates

## 2020-11-04 NOTE — ASSESSMENT & PLAN NOTE
Chronic medical condition.  Continuing home medication.  Monitoring.  Renal consulted for routine HD  BNP is 4500 which appears to be her baseline

## 2020-11-04 NOTE — PLAN OF CARE
Pt has altered mental status and lives with sister Aide Watson who is her cg. Pt uses [facility]. Dr Kalie Neely is pt's PCP. Cm will give sister a 5 Wishes Booklet to discuss with pt when she is more alert and can make decisions which Aide states when she is at home she is alert/oriented.Pt has son who now lives in [place]. Cm to follow until discharge from hospital.    11/04/20 1345   Discharge Assessment   Assessment Type Discharge Planning Assessment   Confirmed/corrected address and phone number on facesheet? Yes   Assessment information obtained from? Other  (Aide Watson sister [phone])   Communicated expected length of stay with patient/caregiver no   Prior to hospitilization cognitive status: Unable to Assess   Prior to hospitalization functional status: Assistive Equipment;Wheelchair Bound  (Uses walker/cane or wc)   Current cognitive status: Unable to Assess   Current Functional Status: Completely Dependent   Lives With sibling(s)   Able to Return to Prior Arrangements yes   Is patient able to care for self after discharge? Unable to determine at this time (comments)   Who are your caregiver(s) and their phone number(s)? Aide Watson sister [phone]  Elvi Hi  sister  [phone]   Patient's perception of discharge disposition home health   Readmission Within the Last 30 Days previous discharge plan unsuccessful  (Was told she still has pneumonia)   If yes, most recent facility [facility]: [facility]   Patient currently being followed by outpatient case management? Unable to determine (comments)   Patient currently receives any other outside agency services? No   Equipment Currently Used at Home walker, rolling;cane, straight;3-in-1 commode;oxygen;nebulizer;shower chair   Part D Coverage [employer]   Do you have any problems affording any of your prescribed medications? No   Is the patient taking medications as prescribed? yes   Does the patient have transportation home? Yes   Transportation  Anticipated family or friend will provide   Dialysis Name and Scheduled days [facility]   Does the patient receive services at the [facility]? No   Discharge Plan A Home Health   Discharge Plan B Home Health   DME Needed Upon Discharge  wheelchair   Patient/Family in Agreement with Plan yes   Readmission Questionnaire   At the time of your discharge, did someone talk to you about what your health problems were? Yes   At the time of discharge, did someone talk to you about what to watch out for regarding worsening of your health problem? Yes   At the time of discharge, did someone talk to you about what to do if you experienced worsening of your health problem? Yes   At the time of discharge, did someone talk to you about which medication to take when you left the hospital and which ones to stop taking? Yes   At the time of discharge, did someone talk to you about when and where to follow up with a doctor after you left the hospital? Yes   What do you believe caused you to be sick enough to be re-admitted? Altered mental status   How often do you need to have someone help you when you read instructions, pamphlets, or other written material from your doctor or pharmacy? Sometimes   Do you have problems taking your medications as prescribed? No   Do you have any problems affording any of  your prescribed medications? No   Do you have problems obtaining/receiving your medications? No   Does the patient have transportation to healthcare appointments? Yes   Living Arrangements house   Does the patient have family/friends to help with healtcare needs after discharge? yes   Does your caregiver provide all the help you need? Yes      -s/p VATs and pleurex. 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

## 2020-11-04 NOTE — ASSESSMENT & PLAN NOTE
Broad IV abx.   Sputum Cx, BCx  Recently admitted for the same.  Discharged on Augmentin which she reports she took.  Speech eval to assess swallow to make sure she is not aspirating.  CXR with persistent right lung infiltrates but not unexpected given recent pnuemonia

## 2020-11-04 NOTE — H&P
[facility] Medicine  History & Physical    Patient Name: Griselda Neely  MRN: [id number]  Admission Date: 11/4/2020  Attending Physician: Hilary La MD  Primary Care Provider: Kalie Neely MD         Patient information was obtained from patient and ER records.     Subjective:     Principal Problem:Encephalopathy    Chief Complaint:   Chief Complaint   Patient presents with    Fatigue     last normal 3 hours ago. Weakness generally.     Altered Mental Status    Fever     103.4 temp        HPI: 75 year old female with PMHx ESRD on HD, parox Afib on Coumadin, Chronic HF, PVD presents to the ED with encephalopathy.  Patient has no memory of tonight's events.  History supplemented by ED MD and chart.  Patient recently admitted with fever and encephalopathy 10/26.  CXR revealing of pnuemonia.  She was also treated for Afib with RVR during that hospital stay.  Patient discharged 10/29 after declining further treatment and was discharged on Augmentin.  Patient reports she took Augmentin as prescribed and had been feeling well at home other than a cough with clear sputum. No fevers or chills.  Per ED MD, patient's family noted she went to bed in her usual state of health but was later found on the floor confused at approximately 0200.  In the ED she was noted to have a 103 fever and was in Afib with RVR.  Rate has improved with improvement in temperature. RN reports patient quite confused on presentation but is now improved from a mentation standpoint.  She is able to answer my orientation questions of name, location, year though initially tells me she already answered these questions. She does not recall being confused or being found on the floor.  In the ED, breath sounds are quite rhonchus. She reports she does not really make urine any more at home.     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    both legs    CHF  (congestive heart failure)     Encounter for blood transfusion 03/2016    Gout     Hemodialysis access, AV graft     Hypertension     Mitral valve regurgitation     Osteoarthritis     Pancreatitis     Peripheral vascular disease     Pneumonia 09/09/2017    Renal failure        Past Surgical History:   Procedure Laterality Date    ACHILLES TENDON SURGERY Right     ANGIOGRAPHY OF ARTERIOVENOUS SHUNT Right 1/7/2020    Procedure: Fistulogram with Possible Intervention;  Surgeon: Joseph Loyola MD;  Location: [facility] CATH/EP LAB;  Service: Cardiology;  Laterality: Right;    ANGIOGRAPHY OF LOWER EXTREMITY Left 3/18/2020    Procedure: Angiogram Extremity Unilateral;  Surgeon: Ali Khoobehi, MD;  Location: [facility] CATH/EP LAB;  Service: Cardiology;  Laterality: Left;    APPENDECTOMY      CARDIAC CATHETERIZATION  07/03/2017    CHOLECYSTECTOMY      COLONOSCOPY Left 10/4/2020    Procedure: COLONOSCOPY;  Surgeon: Jordan Kilpatrick MD;  Location: [facility] ENDO;  Service: Endoscopy;  Laterality: Left;    ESOPHAGOGASTRODUODENOSCOPY Left 8/17/2020    Procedure: EGD (ESOPHAGOGASTRODUODENOSCOPY);  Surgeon: Talat Trevizo MD;  Location: [facility] ENDO;  Service: Endoscopy;  Laterality: Left;    ESOPHAGOGASTRODUODENOSCOPY Left 10/2/2020    Procedure: EGD (ESOPHAGOGASTRODUODENOSCOPY);  Surgeon: Talat Trevizo MD;  Location: [facility] ENDO;  Service: Endoscopy;  Laterality: Left;    heal surgery Right     HYSTERECTOMY      INSERTION OF STENT INTO PERIPHERAL VESSEL N/A 1/7/2020    Procedure: INSERTION, STENT, VESSEL, PERIPHERAL;  Surgeon: Joseph Loyola MD;  Location: [facility] CATH/EP LAB;  Service: Cardiology;  Laterality: N/A;    MITRAL VALVE REPLACEMENT      PARATHYROIDECTOMY      PARATHYROIDECTOMY  07/13/2017    PERCUTANEOUS TRANSLUMINAL ANGIOPLASTY OF ARTERIOVENOUS FISTULA N/A 1/7/2020    Procedure: PTA, AV FISTULA;  Surgeon: Joseph Loyola MD;  Location: [facility] CATH/EP LAB;  Service: Cardiology;  Laterality: N/A;     PERITONEAL CATHETER INSERTION      RENAL BIOPSY      vocal cord nodule      WOUND DEBRIDEMENT Left 7/13/2020    Procedure: DEBRIDEMENT, WOUND;  Surgeon: Carlos Elam III, MD;  Location: [facility];  Service: General;  Laterality: Left;       Review of patient's allergies indicates:  No Known Allergies    No current facility-administered medications on file prior to encounter.      Current Outpatient Medications on File Prior to Encounter   Medication Sig    amoxicillin-clavulanate 250-125mg (AUGMENTIN) 250-125 mg Tab Take 1 tablet by mouth every 12 (twelve) hours.    aspirin (ECOTRIN) 81 MG EC tablet Take 1 tablet (81 mg total) by mouth once daily.    calcium acetate (PHOSLO) 667 mg capsule Take 667 mg by mouth once daily.     diltiaZEM (CARDIZEM CD) 180 MG 24 hr capsule Take 1 capsule by mouth once daily    folic acid/vit B complex and C (JENNIFER-KODAK ORAL) Take 1 tablet by mouth once daily.    isosorbide mononitrate (IMDUR) 30 MG 24 hr tablet Take 30 mg by mouth once daily.    losartan (COZAAR) 25 MG tablet Take 25 mg by mouth once daily.    magnesium oxide (MAG-OX) 400 mg (241.3 mg magnesium) tablet Take 1 tablet by mouth once daily (Patient taking differently: Take 400 mg by mouth once daily. )    metoprolol succinate (TOPROL-XL) 25 MG 24 hr tablet Take 1 tablet (25 mg total) by mouth nightly.    ondansetron (ZOFRAN-ODT) 4 MG TbDL Take 4 mg by mouth every 6 (six) hours as needed.    oxyCODONE-acetaminophen (PERCOCET) 5-325 mg per tablet Take 1 tablet by mouth every 6 (six) hours as needed for Pain.    pantoprazole (PROTONIX) 40 MG tablet Take 1 tablet (40 mg total) by mouth once daily.    warfarin (COUMADIN) 2.5 MG tablet Take 2.5 mg by mouth once daily.    wheelchair An 1 Device by Misc.(Non-Drug; Combo Route) route as needed (needed for transport).    zinc sulfate 220 mg Tab tablet Take 220 mg by mouth every other day.     Family History     Problem Relation (Age of Onset)    Arthritis  Mother    Diabetes Mother, Father    Early death Sister, Sister    Heart disease Sister, Maternal Grandfather, Mother    Heart failure Mother    Hypertension Mother        Tobacco Use    Smoking status: Current Some Day Smoker     Packs/day: 0.50     Years: 45.00     Pack years: 22.50     Types: Cigarettes    Smokeless tobacco: Never Used   Substance and Sexual Activity    Alcohol use: No    Drug use: No    Sexual activity: Not Currently     Review of Systems   Constitutional: Positive for fever.   HENT: Negative.    Eyes: Negative.    Respiratory: Positive for cough and shortness of breath.    Cardiovascular: Negative.    Gastrointestinal: Negative.    Endocrine: Negative.    Genitourinary: Negative.    Musculoskeletal: Negative.    Skin: Negative.    Allergic/Immunologic: Negative.    Neurological: Negative.    Hematological: Negative.    Psychiatric/Behavioral: Positive for confusion.     Objective:     Vital Signs (Most Recent):  Temp: 97.7 °F (36.5 °C) (11/04/20 0524)  Pulse: 78 (11/04/20 0529)  Resp: (!) 23 (11/04/20 0529)  BP: 112/62 (11/04/20 0524)  SpO2: 95 % (11/04/20 0529) Vital Signs (24h Range):  Temp:  [97.7 °F (36.5 °C)-103.4 °F (39.7 °C)] 97.7 °F (36.5 °C)  Pulse:  [] 78  Resp:  [21-28] 23  SpO2:  [94 %-100 %] 95 %  BP: (112-142)/(55-81) 112/62     Weight: 60.8 kg (134 lb 0.6 oz)  Body mass index is 22.31 kg/m².    Physical Exam  Vitals signs and nursing note reviewed.   Constitutional:       General: She is not in acute distress.     Appearance: She is well-developed.      Comments: thin   HENT:      Head: Normocephalic and atraumatic.   Eyes:      Pupils: Pupils are equal, round, and reactive to light.   Neck:      Musculoskeletal: Normal range of motion.   Cardiovascular:      Heart sounds: No murmur.      Comments: Irregular   Pulmonary:      Effort: Pulmonary effort is normal.      Breath sounds: Rhonchi present. No wheezing.      Comments: O2 per NC  Abdominal:      General: There is  no distension.      Palpations: Abdomen is soft.      Tenderness: There is no abdominal tenderness. There is no guarding or rebound.   Musculoskeletal: Normal range of motion.   Skin:     Findings: No rash.   Neurological:      Mental Status: She is alert and oriented to person, place, and time.      Cranial Nerves: No cranial nerve deficit.      Sensory: No sensory deficit.           CRANIAL NERVES     CN III, IV, VI   Pupils are equal, round, and reactive to light.       Significant Labs:   CBC:   Recent Labs   Lab 11/04/20  0249   WBC 18.70*   HGB 9.9*   HCT 32.8*   *     CMP:   Recent Labs   Lab 11/04/20  0249      K 5.7*      CO2 25   GLU 85   BUN 40*   CREATININE 6.9*   CALCIUM 7.6*   PROT 7.7   ALBUMIN 2.8*   BILITOT 1.3*   ALKPHOS 131   AST 34   ALT 16   ANIONGAP 15   EGFRNONAA 5.4*     Lactic Acid:   Recent Labs   Lab 11/04/20  0246   LACTATE 2.0*     Troponin:   Recent Labs   Lab 11/04/20  0249   TROPONINI 0.057*     Urine Studies:   Recent Labs   Lab 11/04/20  0307   COLORU Brown*   APPEARANCEUA Cloudy*   PHUR 7.0   SPECGRAV 1.020   PROTEINUA 3+*   GLUCUA Negative   KETONESU Negative   BILIRUBINUA 2+*   OCCULTUA 3+*   NITRITE Positive*   UROBILINOGEN 1.0   LEUKOCYTESUR 2+*   RBCUA >100*   WBCUA >100*   BACTERIA Negative   SQUAMEPITHEL 39   HYALINECASTS 81*       Significant Imaging: CXR: I have reviewed all pertinent results/findings within the past 24 hours and my personal findings are:  Persistent right lung infiltrates     Assessment/Plan:     * Encephalopathy  Suspect due to infection and fever.  See sepsis for plan.  Head CT done in the ED and no acute process.  No focal deficits on my exam.       Hyperkalemia  Renal consulted for routine HD  Monitoring labs       Acute cystitis  Per report, does not make a lot of urine due to ESRD state.  Not sure if this represents a true UTI versus colonization but will cover with broad IV abx    Right lower lobe pneumonia  Broad IV abx.    Sputum Cx, BCx  Recently admitted for the same.  Discharged on Augmentin which she reports she took.  Speech eval to assess swallow to make sure she is not aspirating.  CXR with persistent right lung infiltrates but not unexpected given recent pnuemonia      Atrial fibrillation with rapid ventricular response  HR initially 123. Was febrile at that time. Improved to low 100s with improvement in temperature.  Will admit to telemetry in the event she needs a gtt.  None required at this time.  Continuing her home rate controlling medication as well as coumadin.  INR 1.8.  Repeating in AM.      Calciphylaxis  Has chronic skin findings. No acute issues.       Anemia  Chronic.  H/H at her baseline per my chart review.       Chronic systolic heart failure  Chronic medical condition.  Continuing home medication.  Monitoring.  Renal consulted for routine HD  BNP is 4500 which appears to be her baseline       HLD (hyperlipidemia)  Chronic medical condition.  Continuing home medication.  Monitoring.        Sepsis  Patient meets sepsis criteria with possible source including pneumonia and UTI  CXR with persistent infiltrate but unclear to me if this is an active infection versus continued radiographic evidence of previous pneumonia treated 10/26-10/29.  Sputum, BCx ordered. IV abx.  Speech eval ordered to evaluate for possible aspiration if this is a recurrence.  Lung exam with bilateral rhonchi.  UA indicative of cough but given that she does not make urine, not sure if this is just colonization of bladder versus true infection. Broad coverage IV abx.   Fever in the ED.  Procal is 2 which is actually improved from 18 10/26/20 per my chart review. Encephalopathy and HR improved with improvement in temperature.   Review of skin did not indicate a soft tissue infection  Will need to closely monitor to see if she is trying to declare a source other than pneumonia or possible UTI  IVFs not ordered due to ESRD and Chronic HF with  BNP 4500. Not hypotensive.  Repeating Lactic acid.     ESRD (end stage renal disease) on HD M,W, F  Consulting renal for routine HD  K elevated at 5.7.  Monitoring electrolytes.      HTN (hypertension)  Chronic medical condition.  Continuing home medication.  Monitoring.          VTE Risk Mitigation (From admission, onward)         Ordered     warfarin (COUMADIN) tablet 2.5 mg  Daily      11/04/20 0429     IP VTE HIGH RISK PATIENT  Once      11/04/20 0429     Place sequential compression device  Until discontinued      11/04/20 0429                   Hilary La MD  Department of Hospital Medicine   [facility]

## 2020-11-04 NOTE — PROGRESS NOTES
Pharmacist Renal Dose Adjustment Note    Griselda Neely is a 75 y.o. female being treated with the medication Zosyn    Patient Data:    Vital Signs (Most Recent):  Temp: 98.7 °F (37.1 °C) (11/04/20 0439)  Pulse: 75 (11/04/20 0502)  Resp: (!) 21 (11/04/20 0502)  BP: (!) 119/55 (11/04/20 0500)  SpO2: 97 % (11/04/20 0502)   Vital Signs (72h Range):  Temp:  [98.7 °F (37.1 °C)-103.4 °F (39.7 °C)]   Pulse:  []   Resp:  [21-28]   BP: (118-142)/(55-81)   SpO2:  [94 %-100 %]      Recent Labs   Lab 10/29/20  0349 11/04/20  0249   CREATININE 4.9* 6.9*     Serum creatinine: 6.9 mg/dL (H) 11/04/20 0249  Estimated creatinine clearance: 6.4 mL/min (A)    Medication:Zosyn dose: 3.375 mg frequency Q8H will be changed to medication:Zosyn dose:3.375 mg frequency:Q12H    Pharmacist's Name: Baudilio Downs  Pharmacist's Extension: [phone]

## 2020-11-04 NOTE — CONSULTS
"[facility]  Adult Nutrition   Consult Note (Initial Assessment)     SUMMARY     Recommendations  Recommendation/Intervention:   1. Recommend least restrictive diet as per SLP recommendations--encourage intake  2.  to assist with meal choices daily  Goals: 1. Patient to meet at least 75% estimated needs through diet intake  Nutrition Goal Status: new  Communication of RD Recs: reviewed with RN    Dietitian Rounds Brief    Pt seen for consult. HD ongoing at time of rounds. No bkfst consumed this AM per RN--pt refused. Pt with history of poor oral intake per old chart review. Typically refuses oral supplements. Weight fluctuations noted--? fluid changes with HD. SLP consulted for evaluation of swallow function/aspiration. LBM 11/3. Continue to encourage intake as able.     Reason for Assessment  Reason For Assessment: consult  Diagnosis: other (see comments)(encephalopathy)  Relevant Medical History: ESRD on HD, afib, CHF, PVD    Nutrition Risk Screen  Nutrition Risk Screen: no indicators present     MST Score: 3  Have you recently lost weight without trying?: Yes: Unsure how much  Weight loss score: 2  Have you been eating poorly because of a decreased appetite?: Yes  Appetite score: 1       Nutrition/Diet History  Food Allergies: NKFA  Factors Affecting Nutritional Intake: decreased appetite    Anthropometrics  Temp: 98 °F (36.7 °C)  Height Method: Stated  Height: 5' 5" (165.1 cm)  Height (inches): 65 in  Weight Method: Bed Scale  Weight: 60.8 kg (134 lb 0.6 oz)  Weight (lb): 134.04 lb  Ideal Body Weight (IBW), Female: 125 lb  % Ideal Body Weight, Female (lb): 107.23 %  BMI (Calculated): 22.3  BMI Grade: 18.5-24.9 - normal       Weight History:  Wt Readings from Last 10 Encounters:   11/04/20 60.8 kg (134 lb 0.6 oz)   10/27/20 59.1 kg (130 lb 4.7 oz)   10/25/20 60.3 kg (133 lb)   10/22/20 60.3 kg (133 lb)   10/06/20 53.5 kg (118 lb)   10/03/20 53.5 kg (117 lb 15.1 oz)   10/01/20 53.5 kg (118 lb) "   09/22/20 55.6 kg (122 lb 9.2 oz)   09/08/20 54.4 kg (120 lb)   09/05/20 59.9 kg (132 lb 0.9 oz)       Lab/Procedures/Meds: Pertinent Labs Reviewed    Clinical Chemistry:  Recent Labs   Lab 10/29/20  0349 11/04/20 0249 11/04/20  0836    140  --    K 4.1 5.7*  --    CL 98 100  --    CO2 24 25  --    GLU 71 85  --    BUN 35* 40*  --    CREATININE 4.9* 6.9*  --    CALCIUM 7.5* 7.6*  --    PROT  --  7.7  --    ALBUMIN  --  2.8*  --    BILITOT  --  1.3*  --    ALKPHOS  --  131  --    AST  --  34  --    ALT  --  16  --    ANIONGAP 14 15  --    ESTGFRAFRICA 9.4* 6.2*  --    EGFRNONAA 8.1* 5.4*  --    MG 1.9  --  1.6   PHOS 3.8  --   --        CBC:   Recent Labs   Lab 11/04/20 0249   WBC 18.70*   RBC 3.58*   HGB 9.9*   HCT 32.8*   *   MCV 92   MCH 27.7   MCHC 30.2*       Cardiac Profile:  Recent Labs   Lab 11/04/20 0249 11/04/20  0836   BNP >4,500*  --    TROPONINI 0.057* 0.051*         Medications: Pertinent Medications reviewed    Scheduled Meds:   sodium chloride 0.9%   Intravenous Once    aspirin  81 mg Oral Daily    calcium acetate(phosphat bind)  667 mg Oral Daily    diltiaZEM  180 mg Oral Daily    isosorbide mononitrate  30 mg Oral Daily    losartan  25 mg Oral Daily    magnesium oxide  400 mg Oral Daily    metoprolol succinate  25 mg Oral Nightly    mupirocin   Nasal BID    pantoprazole  40 mg Oral Before breakfast    piperacillin-tazobactam (ZOSYN) IVPB  3.375 g Intravenous Q12H    sodium thiosulfate  12.5 g Intravenous Every Mon, Wed, Fri    sodium thiosulfate  12.5 g Intravenous Every Mon, Wed, Fri    sodium thiosulfate  25 g Intravenous Every Mon, Wed, Fri    warfarin  2.5 mg Oral Daily       Continuous Infusions:    PRN Meds:.sodium chloride 0.9%, acetaminophen, calcium chloride IVPB, calcium chloride IVPB, calcium chloride IVPB, ipratropium, levalbuterol, magnesium oxide, magnesium sulfate IVPB, magnesium sulfate IVPB, magnesium sulfate IVPB, magnesium sulfate IVPB, melatonin,  ondansetron, polyethylene glycol, potassium chloride in water, potassium chloride in water, potassium chloride in water, potassium chloride in water, potassium chloride, potassium chloride, potassium chloride, potassium chloride, sodium chloride 0.9%, sodium phosphate IVPB, sodium phosphate IVPB, sodium phosphate IVPB, sodium phosphate IVPB, sodium phosphate IVPB, Pharmacy to dose Vancomycin consult **AND** vancomycin - pharmacy to dose    Estimated/Assessed Needs  Weight Used For Calorie Calculations: 60.8 kg (134 lb 0.6 oz)  Energy Calorie Requirements (kcal): 6035-5379 (25-30 kcal/kg)  Energy Need Method: Kcal/kg  Protein Requirements: 73-85 g (1.2-1.4 g/kg) 2' HD  Weight Used For Protein Calculations: 60.8 kg (134 lb 0.6 oz)  Fluid Requirements (mL): 1000 ml + UOP  Estimated Fluid Requirement Method: other (see comments)  RDA Method (mL): 1520       Nutrition Prescription Ordered  Current Diet Order: Renal    Evaluation of Received Nutrient/Fluid Intake  Energy Calories Required: not meeting needs  Protein Required: not meeting needs  Tolerance: other (see comments)(Unable to assess)     Intake/Output Summary (Last 24 hours) at 11/4/2020 1035  Last data filed at 11/4/2020 0459  Gross per 24 hour   Intake 400 ml   Output --   Net 400 ml        % Meal Intake: Other: no bkfst this AM    Nutrition Risk  Level of Risk/Frequency of Follow-up: high     Monitor and Evaluation  Food and Nutrient Intake: energy intake, food and beverage intake  Food and Nutrient Adminstration: diet order  Physical Activity and Function: nutrition-related ADLs and IADLs  Anthropometric Measurements: weight, weight change  Biochemical Data, Medical Tests and Procedures: gastrointestinal profile, electrolyte and renal panel, glucose/endocrine profile  Nutrition-Focused Physical Findings: overall appearance     Nutrition Follow-Up  RD Follow-up?: Yes    Treasure Daley RD 11/04/2020 10:35 AM

## 2020-11-04 NOTE — ASSESSMENT & PLAN NOTE
Per report, does not make a lot of urine due to ESRD state.  Not sure if this represents a true UTI versus colonization but will cover with broad IV abx

## 2020-11-04 NOTE — ED NOTES
Pt temp is now 98.7. Pt is alert, sitting up in bed and is oriented x4. Pt updated on plan of care. No needs at this time. Will continue to monitor.

## 2020-11-04 NOTE — ED PROVIDER NOTES
Encounter Date: 11/4/2020       History     Chief Complaint   Patient presents with    Fatigue     last normal 3 hours ago. Weakness generally.     Altered Mental Status    Fever     103.4 temp     75-year-old female with a history of AFib (on Coumadin), CHF, HTN, ESRD presents to the ER with altered mental status.  EMS states that family reported that she went to sleep in her usual state of health.  At 2:00 a.m., they awoke to find her lying on the floor appearing altered.  States that she was moaning incomprehensibly.  EMS did not have any further information.  Patient was unable to give me of any other information other than she was not in pain.  Chart shows that patient was recently discharged with pneumonia.  She also presented altered at that time.        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    both legs    CHF (congestive heart failure)     Encounter for blood transfusion 03/2016    Gout     Hemodialysis access, AV graft     Hypertension     Mitral valve regurgitation     Osteoarthritis     Pancreatitis     Peripheral vascular disease     Pneumonia 09/09/2017    Renal failure      Past Surgical History:   Procedure Laterality Date    ACHILLES TENDON SURGERY Right     ANGIOGRAPHY OF ARTERIOVENOUS SHUNT Right 1/7/2020    Procedure: Fistulogram with Possible Intervention;  Surgeon: Joseph Loyola MD;  Location: [facility] CATH/EP LAB;  Service: Cardiology;  Laterality: Right;    ANGIOGRAPHY OF LOWER EXTREMITY Left 3/18/2020    Procedure: Angiogram Extremity Unilateral;  Surgeon: Ali Khoobehi, MD;  Location: [facility] CATH/EP LAB;  Service: Cardiology;  Laterality: Left;    APPENDECTOMY      CARDIAC CATHETERIZATION  07/03/2017    CHOLECYSTECTOMY      COLONOSCOPY Left 10/4/2020    Procedure: COLONOSCOPY;  Surgeon: Jordan Kilpatrick MD;  Location: [facility] ENDO;  Service:  Endoscopy;  Laterality: Left;    ESOPHAGOGASTRODUODENOSCOPY Left 8/17/2020    Procedure: EGD (ESOPHAGOGASTRODUODENOSCOPY);  Surgeon: Talat Trevizo MD;  Location: [facility] ENDO;  Service: Endoscopy;  Laterality: Left;    ESOPHAGOGASTRODUODENOSCOPY Left 10/2/2020    Procedure: EGD (ESOPHAGOGASTRODUODENOSCOPY);  Surgeon: Talat Trevizo MD;  Location: [facility] ENDO;  Service: Endoscopy;  Laterality: Left;    heal surgery Right     HYSTERECTOMY      INSERTION OF STENT INTO PERIPHERAL VESSEL N/A 1/7/2020    Procedure: INSERTION, STENT, VESSEL, PERIPHERAL;  Surgeon: Joseph Loyola MD;  Location: [facility] CATH/EP LAB;  Service: Cardiology;  Laterality: N/A;    MITRAL VALVE REPLACEMENT      PARATHYROIDECTOMY      PARATHYROIDECTOMY  07/13/2017    PERCUTANEOUS TRANSLUMINAL ANGIOPLASTY OF ARTERIOVENOUS FISTULA N/A 1/7/2020    Procedure: PTA, AV FISTULA;  Surgeon: Joseph Loyola MD;  Location: [facility] CATH/EP LAB;  Service: Cardiology;  Laterality: N/A;    PERITONEAL CATHETER INSERTION      RENAL BIOPSY      vocal cord nodule      WOUND DEBRIDEMENT Left 7/13/2020    Procedure: DEBRIDEMENT, WOUND;  Surgeon: Carlos Elam III, MD;  Location: [facility] OR;  Service: General;  Laterality: Left;     Family History   Problem Relation Age of Onset    Heart disease Sister     Early death Sister         heart as baby    Heart disease Maternal Grandfather     Diabetes Mother     Hypertension Mother     Heart failure Mother     Heart disease Mother     Arthritis Mother     Diabetes Father     Early death Sister         infant     Social History     Tobacco Use    Smoking status: Current Some Day Smoker     Packs/day: 0.50     Years: 45.00     Pack years: 22.50     Types: Cigarettes    Smokeless tobacco: Never Used   Substance Use Topics    Alcohol use: No    Drug use: No     Review of Systems   Unable to perform ROS: Mental status change       Physical Exam     Initial Vitals [11/04/20 0236]   BP Pulse Resp Temp SpO2  "  129/81 (!) 123 (!) 28 (!) 103.4 °F (39.7 °C) 98 %      MAP       --         Physical Exam    Constitutional: She appears well-developed and well-nourished. She appears distressed.   HENT:   Head: Normocephalic and atraumatic.   Mouth/Throat: Oropharynx is clear and moist.   Eyes: Conjunctivae and EOM are normal. Pupils are equal, round, and reactive to light.   Neck: Normal range of motion.   Cardiovascular: Normal heart sounds and intact distal pulses. An irregularly irregular rhythm present.  Tachycardia present.    No murmur heard.  Pulmonary/Chest: No accessory muscle usage. Tachypnea noted. No respiratory distress. She has decreased breath sounds. She has no wheezes. She has rhonchi (Diffuse). She has no rales.   Abdominal: Soft. Bowel sounds are normal. She exhibits no distension. There is no abdominal tenderness. There is no rebound and no guarding.   Musculoskeletal: Normal range of motion. Edema ( dense edema to the shins bilaterally.) present. No tenderness.      Comments: AV fistula to right upper extremity.  Palpable thrill.   Neurological: She is alert. GCS eye subscore is 4. GCS verbal subscore is 3. GCS motor subscore is 6.   Alert but not answering orientation questions.  Confused responses. PERRL, EOMI.  Moving upper extremities normally.  Occasional movements in lower extremities, but will not follow commands.  When I ask her if she can move her legs, she screams at me "Yes!", but then she does not move them.   Skin: Skin is warm and dry.   Psychiatric: She has a normal mood and affect. Thought content normal.         ED Course   Procedures  Labs Reviewed   CBC W/ AUTO DIFFERENTIAL - Abnormal; Notable for the following components:       Result Value    WBC 18.70 (*)     RBC 3.58 (*)     Hemoglobin 9.9 (*)     Hematocrit 32.8 (*)     MCHC 30.2 (*)     RDW 18.3 (*)     Platelets 369 (*)     Immature Granulocytes 0.6 (*)     Gran # (ANC) 17.4 (*)     Immature Grans (Abs) 0.12 (*)     Lymph # 0.8 (*) "     Gran % 93.1 (*)     Lymph % 4.1 (*)     Mono % 1.8 (*)     All other components within normal limits   COMPREHENSIVE METABOLIC PANEL - Abnormal; Notable for the following components:    Potassium 5.7 (*)     BUN 40 (*)     Creatinine 6.9 (*)     Calcium 7.6 (*)     Albumin 2.8 (*)     Total Bilirubin 1.3 (*)     eGFR if  6.2 (*)     eGFR if non  5.4 (*)     All other components within normal limits   LACTIC ACID, PLASMA - Abnormal; Notable for the following components:    Lactate (Lactic Acid) 2.0 (*)     All other components within normal limits    Narrative:      Lactic Acid critical result(s) repeated. Called and verbal readback   obtained from Selwyn Harp Rn/ER by SUN 11/04/2020 03:25   URINALYSIS, REFLEX TO URINE CULTURE - Abnormal; Notable for the following components:    Color, UA Brown (*)     Appearance, UA Cloudy (*)     Protein, UA 3+ (*)     Bilirubin (UA) 2+ (*)     Occult Blood UA 3+ (*)     Nitrite, UA Positive (*)     Leukocytes, UA 2+ (*)     All other components within normal limits    Narrative:     Specimen Source->Urine   PROTIME-INR - Abnormal; Notable for the following components:    PT 19.7 (*)     All other components within normal limits   B-TYPE NATRIURETIC PEPTIDE - Abnormal; Notable for the following components:    BNP >4,500 (*)     All other components within normal limits   TROPONIN I - Abnormal; Notable for the following components:    Troponin I 0.057 (*)     All other components within normal limits   PROCALCITONIN - Abnormal; Notable for the following components:    Procalcitonin 2.10 (*)     All other components within normal limits   URINALYSIS MICROSCOPIC - Abnormal; Notable for the following components:    RBC, UA >100 (*)     WBC, UA >100 (*)     Hyaline Casts, UA 81 (*)     All other components within normal limits    Narrative:     Specimen Source->Urine   CULTURE, BLOOD   CULTURE, BLOOD   CULTURE, URINE   CULTURE, RESPIRATORY    INFLUENZA A AND B ANTIGEN    Narrative:     Specimen Source->Nasopharyngeal Swab   SARS-COV-2 RNA AMPLIFICATION, QUAL        ECG Results          EKG 12-lead (In process)  Result time 11/04/20 05:14:14    In process by Interface, Lab In [facility] (11/04/20 05:14:14)                 Narrative:    Test Reason : R41.82,    Vent. Rate : 121 BPM     Atrial Rate : 121 BPM     P-R Int : 194 ms          QRS Dur : 088 ms      QT Int : 282 ms       P-R-T Axes : 078 -38 110 degrees     QTc Int : 400 ms    Sinus tachycardia  Left axis deviation  Abnormal QRS-T angle, consider primary T wave abnormality  Abnormal ECG  When compared with ECG of 26-OCT-2020 15:35,  Sinus rhythm has replaced Atrial fibrillation    Referred By: [email or web address]   SELF           Confirmed By:                             Imaging Results          CT Head Without Contrast (Final result)  Result time 11/04/20 02:44:00    Final result by Lesly Alfaro MD (11/04/20 02:44:00)                 Narrative:    EXAM:  CT Head Without Intravenous Contrast    CLINICAL HISTORY:  The patient is 75 years old and is Female; Altered mental status; ams    TECHNIQUE:  Axial computed tomography images of the head/brain without intravenous contrast.  Sagittal and coronal reformatted images were created and reviewed.  This CT exam was performed using one or more of the following dose reduction techniques:  automated exposure control, adjustment of the mA and/or kV according to patient size, and/or use of iterative reconstruction technique.    COMPARISON:  CT of the head September 1, 2020    FINDINGS:  ARTIFACTS:  The exam is suboptimal secondary to motion artifact.  BRAIN:  Evidence of prior infarct in the region of the right basal ganglia is noted. There is diffuse cerebral atrophy present, consistent with this patient's age.  There is patchy hypoattenuation of the deep white matter which is non-specific, but most likely owing to chronic small vessel ischemic change in a  patient of this age group.  No intracranial hemorrhage, mass effect, or midline shift is seen. There are no extra-axial fluid collections.  VENTRICLES:  Unremarkable.  No ventriculomegaly.  BONES/JOINTS:  No acute fracture.  SOFT TISSUES:  Unremarkable.  SINUSES:  Unremarkable as visualized.  No acute sinusitis.  MASTOID AIR CELLS:  Unremarkable as visualized.  No mastoid effusion.  ORBITS:  Unremarkable as visualized.    IMPRESSION:  Age-related atrophy and chronic white matter ischemic changes, with no evidence of an acute intracranial abnormality.    Electronically signed by:  Lesly Alfaro MD  11/4/2020 3:47 AM CST Workstation: [id number]                             X-Ray Chest AP Portable (Final result)  Result time 11/04/20 02:58:17    Final result by Camacho Arce IV, MD (11/04/20 02:58:17)                 Narrative:    Chest, single view    HISTORY: Sepsis.    Comparison is made to 10/26/2020.    The cardiac silhouette is enlarged but stable. Central pulmonary  vascular prominence is unchanged. There are postoperative changes and  arteriosclerosis.    Diffuse interstitial and groundglass type pulmonary opacities persist  within the lungs. There is more confluent airspace opacity or volume  loss in the right lung base, slightly improved in the interval. Minor  blunting of the right costophrenic angle is unchanged. Monitoring  electrodes overlie the chest. Urographic displaced fracture of the  lateral aspect of the right sixth rib is noted.    IMPRESSION:    Persistence of bilateral interstitial and groundglass opacities with  more confluent airspace disease in the right lung base.    Stable cardiomegaly.    Unchanged blunting of right costophrenic angle.    Additional observations as above.    Electronically Signed by Camacho Arce M.D. on 11/4/2020 6:51 AM                               Medical Decision Making:   Initial Assessment:   75-year-old female with a history of AFib (on Coumadin), CHF, HTN, ESRD  presents to the ER with altered mental status since 2am.  ED Management:  Plan:  Temp 103.4°, HR 120s, RR 20s.  O2 sat 98% on home O2.  Concern for sepsis causing patient's altered mental status.  Lower suspicion for CVA.  Lab showed WBC 18.7, which is significantly up from discharge.  K 5.7.  BUN 40, creatinine 6.9.  Troponin 0.057, similar to previous.  BNP greater than 4500.  Lactate 2.0.  Flu and COVID negative.  UA and procalcitonin pending.  Blood cultures drawn.  Chest x-ray shows diffuse interstitial markings with right lung airspace disease, similar to previous.  Starting on vanc and Zosyn.  Being very judicious with fluids as patient is already showing evidence of overload.  Patient will likely need to be in the ICU.    Reassessed.  HR improved to 70s with Tylenol.  Mental status also improved.  UA shows nitrite positive, RBC greater than 100, WBC greater than 100, bacteria negative.  Procalcitonin 2.1.  Bedside ultrasound performed by me shows depressed EF.  No evidence of acute right heart strain or pericardial effusion.  IVC not collapsible.  Do not feel that patient could tolerate any IV fluids at this time.  Will admit for severe sepsis.              Attending Attestation:         Attending Critical Care:   Critical Care Times:   Direct Patient Care (initial evaluation, reassessments, and time considering the case)................................................................20 minutes.   Additional History from reviewing old medical records or taking additional history from the family, EMS, PCP, etc.......................10 minutes.   Ordering, Reviewing, and Interpreting Diagnostic Studies...............................................................................................................10 minutes.    Documentation..................................................................................................................................................................................15 minutes.   ==============================================================  · Total Critical Care Time - exclusive of procedural time: 55 minutes.  ==============================================================  Critical care was necessary to treat or prevent imminent or life-threatening deterioration of the following conditions: sepsis.   Critical care was time spent personally by me on the following activities: obtaining history from patient or relative, examination of patient, review of x-rays / CT sent with the patient, review of old charts, ordering lab, x-rays, and/or EKG, ordering and performing treatments and interventions, evaluation of patient's response to treatment, interpretation of cardiac measurements and re-evaluation of patient's conition.   Critical Care Condition: life-threatening                         Clinical Impression:       ICD-10-CM ICD-9-CM   1. Severe sepsis  A41.9 038.9    R65.20 995.92   2. AMS (altered mental status)  R41.82 780.97   3. Pneumonia due to infectious organism, unspecified laterality, unspecified part of lung  J18.9 486   4. Urinary tract infection with hematuria, site unspecified  N39.0 599.0    R31.9 599.70                          ED Disposition Condition    Admit                             Sunny Agosto MD  11/04/20 0710

## 2020-11-05 LAB
ALBUMIN SERPL BCP-MCNC: 2.4 G/DL (ref 3.5–5.2)
ANION GAP SERPL CALC-SCNC: 16 MMOL/L (ref 8–16)
ANION GAP SERPL CALC-SCNC: 16 MMOL/L (ref 8–16)
BASOPHILS # BLD AUTO: 0.03 K/UL (ref 0–0.2)
BASOPHILS NFR BLD: 0.2 % (ref 0–1.9)
BUN SERPL-MCNC: 31 MG/DL (ref 8–23)
BUN SERPL-MCNC: 31 MG/DL (ref 8–23)
CALCIUM SERPL-MCNC: 8.2 MG/DL (ref 8.7–10.5)
CALCIUM SERPL-MCNC: 8.2 MG/DL (ref 8.7–10.5)
CHLORIDE SERPL-SCNC: 100 MMOL/L (ref 95–110)
CHLORIDE SERPL-SCNC: 100 MMOL/L (ref 95–110)
CO2 SERPL-SCNC: 23 MMOL/L (ref 23–29)
CO2 SERPL-SCNC: 23 MMOL/L (ref 23–29)
CREAT SERPL-MCNC: 5.7 MG/DL (ref 0.5–1.4)
CREAT SERPL-MCNC: 5.7 MG/DL (ref 0.5–1.4)
DIFFERENTIAL METHOD: ABNORMAL
EOSINOPHIL # BLD AUTO: 0.1 K/UL (ref 0–0.5)
EOSINOPHIL NFR BLD: 0.6 % (ref 0–8)
ERYTHROCYTE [DISTWIDTH] IN BLOOD BY AUTOMATED COUNT: 17.8 % (ref 11.5–14.5)
EST. GFR  (AFRICAN AMERICAN): 7.8 ML/MIN/1.73 M^2
EST. GFR  (AFRICAN AMERICAN): 7.8 ML/MIN/1.73 M^2
EST. GFR  (NON AFRICAN AMERICAN): 6.8 ML/MIN/1.73 M^2
EST. GFR  (NON AFRICAN AMERICAN): 6.8 ML/MIN/1.73 M^2
GLUCOSE SERPL-MCNC: 72 MG/DL (ref 70–110)
GLUCOSE SERPL-MCNC: 72 MG/DL (ref 70–110)
HCT VFR BLD AUTO: 27.9 % (ref 37–48.5)
HGB BLD-MCNC: 8.4 G/DL (ref 12–16)
IMM GRANULOCYTES # BLD AUTO: 0.05 K/UL (ref 0–0.04)
IMM GRANULOCYTES NFR BLD AUTO: 0.4 % (ref 0–0.5)
INR PPP: 2
LYMPHOCYTES # BLD AUTO: 0.6 K/UL (ref 1–4.8)
LYMPHOCYTES NFR BLD: 4.3 % (ref 18–48)
MAGNESIUM SERPL-MCNC: 1.8 MG/DL (ref 1.6–2.6)
MCH RBC QN AUTO: 27.2 PG (ref 27–31)
MCHC RBC AUTO-ENTMCNC: 30.1 G/DL (ref 32–36)
MCV RBC AUTO: 90 FL (ref 82–98)
MONOCYTES # BLD AUTO: 0.6 K/UL (ref 0.3–1)
MONOCYTES NFR BLD: 3.9 % (ref 4–15)
NEUTROPHILS # BLD AUTO: 12.8 K/UL (ref 1.8–7.7)
NEUTROPHILS NFR BLD: 90.6 % (ref 38–73)
NRBC BLD-RTO: 0 /100 WBC
PHOSPHATE SERPL-MCNC: 4.2 MG/DL (ref 2.7–4.5)
PHOSPHATE SERPL-MCNC: 4.2 MG/DL (ref 2.7–4.5)
PLATELET # BLD AUTO: 289 K/UL (ref 150–350)
PMV BLD AUTO: 10.6 FL (ref 9.2–12.9)
POTASSIUM SERPL-SCNC: 4.4 MMOL/L (ref 3.5–5.1)
POTASSIUM SERPL-SCNC: 4.4 MMOL/L (ref 3.5–5.1)
PROTHROMBIN TIME: 22.2 SEC (ref 10.6–14.8)
RBC # BLD AUTO: 3.09 M/UL (ref 4–5.4)
SODIUM SERPL-SCNC: 139 MMOL/L (ref 136–145)
SODIUM SERPL-SCNC: 139 MMOL/L (ref 136–145)
VANCOMYCIN SERPL-MCNC: 18.2 UG/ML
WBC # BLD AUTO: 14.16 K/UL (ref 3.9–12.7)

## 2020-11-05 PROCEDURE — 12000002 HC ACUTE/MED SURGE SEMI-PRIVATE ROOM

## 2020-11-05 PROCEDURE — 85610 PROTHROMBIN TIME: CPT

## 2020-11-05 PROCEDURE — 99900035 HC TECH TIME PER 15 MIN (STAT)

## 2020-11-05 PROCEDURE — 83735 ASSAY OF MAGNESIUM: CPT

## 2020-11-05 PROCEDURE — 94761 N-INVAS EAR/PLS OXIMETRY MLT: CPT

## 2020-11-05 PROCEDURE — 92610 EVALUATE SWALLOWING FUNCTION: CPT

## 2020-11-05 PROCEDURE — 63600175 PHARM REV CODE 636 W HCPCS: Performed by: INTERNAL MEDICINE

## 2020-11-05 PROCEDURE — 36415 COLL VENOUS BLD VENIPUNCTURE: CPT

## 2020-11-05 PROCEDURE — 25000003 PHARM REV CODE 250: Performed by: FAMILY MEDICINE

## 2020-11-05 PROCEDURE — 85025 COMPLETE CBC W/AUTO DIFF WBC: CPT

## 2020-11-05 PROCEDURE — 25000003 PHARM REV CODE 250: Performed by: INTERNAL MEDICINE

## 2020-11-05 PROCEDURE — 25000003 PHARM REV CODE 250

## 2020-11-05 PROCEDURE — 80069 RENAL FUNCTION PANEL: CPT

## 2020-11-05 PROCEDURE — 80202 ASSAY OF VANCOMYCIN: CPT

## 2020-11-05 RX ADMIN — CHLORHEXIDINE GLUCONATE 15 ML: 1.2 RINSE ORAL at 08:11

## 2020-11-05 RX ADMIN — ISOSORBIDE MONONITRATE 30 MG: 30 TABLET, EXTENDED RELEASE ORAL at 08:11

## 2020-11-05 RX ADMIN — METOPROLOL SUCCINATE 25 MG: 25 TABLET, FILM COATED, EXTENDED RELEASE ORAL at 08:11

## 2020-11-05 RX ADMIN — LOSARTAN POTASSIUM 25 MG: 25 TABLET, FILM COATED ORAL at 08:11

## 2020-11-05 RX ADMIN — MAGNESIUM OXIDE 400 MG: 400 TABLET ORAL at 08:11

## 2020-11-05 RX ADMIN — PIPERACILLIN SODIUM AND TAZOBACTAM SODIUM 3.38 G: 3; .375 INJECTION, POWDER, LYOPHILIZED, FOR SOLUTION INTRAVENOUS at 02:11

## 2020-11-05 RX ADMIN — ASPIRIN 81 MG: 81 TABLET, DELAYED RELEASE ORAL at 08:11

## 2020-11-05 RX ADMIN — PANTOPRAZOLE SODIUM 40 MG: 40 TABLET, DELAYED RELEASE ORAL at 06:11

## 2020-11-05 RX ADMIN — DILTIAZEM HYDROCHLORIDE 180 MG: 180 CAPSULE, COATED, EXTENDED RELEASE ORAL at 08:11

## 2020-11-05 RX ADMIN — MUPIROCIN: 20 OINTMENT TOPICAL at 08:11

## 2020-11-05 RX ADMIN — CALCIUM ACETATE 667 MG: 667 CAPSULE ORAL at 08:11

## 2020-11-05 RX ADMIN — PIPERACILLIN SODIUM AND TAZOBACTAM SODIUM 3.38 G: 3; .375 INJECTION, POWDER, LYOPHILIZED, FOR SOLUTION INTRAVENOUS at 03:11

## 2020-11-05 RX ADMIN — WARFARIN SODIUM 2.5 MG: 2.5 TABLET ORAL at 04:11

## 2020-11-05 NOTE — CONSULTS
[facility]  Nephrology  Consult Note    Patient Name: Griselda Neely  MRN: [id number]  Admission Date: 11/4/2020  Hospital Length of Stay: 0 days  Attending Provider: Darlene Lin MD   Primary Care Physician: Kalie Neely MD  Principal Problem:Encephalopathy    Consults  Subjective:     HPI:  Griselda Neely is a 75-year-old female who arrived at [facility] on 11/04 for evaluation of encephalopathy, fever, and AFib-RVR.  Apparently, patient was found by family at approximately 2:00 a.m. on the floor and confused.  She was recently discharged on 10/29 for treatment of fever, encephalopathy, pneumonia, and AFib-RVR.  She was discharged after refusing further treatment and was sent home on Augmentin.  PMHx including:  Anemia, AFib, CHF, hypertension, MVR, osteoarthritis, PVD, calciphylaxis, and ESRD.  In the ED, empiric antibiotics were started for treatment of sepsis with possible source including pneumonia and UTI.  Nephrology consulted for ESRD management.  The patient attends [facility] on M-W-F schedule.  Her nephrologist is Dr. Inrgam.    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    both legs    CHF (congestive heart failure)     Encounter for blood transfusion 03/2016    Gout     Hemodialysis access, AV graft     Hypertension     Mitral valve regurgitation     Osteoarthritis     Pancreatitis     Peripheral vascular disease     Pneumonia 09/09/2017    Renal failure        Past Surgical History:   Procedure Laterality Date    ACHILLES TENDON SURGERY Right     ANGIOGRAPHY OF ARTERIOVENOUS SHUNT Right 1/7/2020    Procedure: Fistulogram with Possible Intervention;  Surgeon: Joseph Loyola MD;  Location: [facility] CATH/EP LAB;  Service: Cardiology;  Laterality: Right;    ANGIOGRAPHY OF LOWER EXTREMITY Left 3/18/2020    Procedure: Angiogram Extremity Unilateral;  Surgeon: Ali Khoobehi, MD;  Location: [facility]  CATH/EP LAB;  Service: Cardiology;  Laterality: Left;    APPENDECTOMY      CARDIAC CATHETERIZATION  07/03/2017    CHOLECYSTECTOMY      COLONOSCOPY Left 10/4/2020    Procedure: COLONOSCOPY;  Surgeon: Jordan Kilpatrick MD;  Location: [facility] ENDO;  Service: Endoscopy;  Laterality: Left;    ESOPHAGOGASTRODUODENOSCOPY Left 8/17/2020    Procedure: EGD (ESOPHAGOGASTRODUODENOSCOPY);  Surgeon: Talat Trevizo MD;  Location: [facility] ENDO;  Service: Endoscopy;  Laterality: Left;    ESOPHAGOGASTRODUODENOSCOPY Left 10/2/2020    Procedure: EGD (ESOPHAGOGASTRODUODENOSCOPY);  Surgeon: Talat Trevizo MD;  Location: [facility] ENDO;  Service: Endoscopy;  Laterality: Left;    heal surgery Right     HYSTERECTOMY      INSERTION OF STENT INTO PERIPHERAL VESSEL N/A 1/7/2020    Procedure: INSERTION, STENT, VESSEL, PERIPHERAL;  Surgeon: Joseph Loyola MD;  Location: [facility] CATH/EP LAB;  Service: Cardiology;  Laterality: N/A;    MITRAL VALVE REPLACEMENT      PARATHYROIDECTOMY      PARATHYROIDECTOMY  07/13/2017    PERCUTANEOUS TRANSLUMINAL ANGIOPLASTY OF ARTERIOVENOUS FISTULA N/A 1/7/2020    Procedure: PTA, AV FISTULA;  Surgeon: Joseph Loyola MD;  Location: [facility] CATH/EP LAB;  Service: Cardiology;  Laterality: N/A;    PERITONEAL CATHETER INSERTION      RENAL BIOPSY      vocal cord nodule      WOUND DEBRIDEMENT Left 7/13/2020    Procedure: DEBRIDEMENT, WOUND;  Surgeon: Carlos Elam III, MD;  Location: [facility] OR;  Service: General;  Laterality: Left;       Review of patient's allergies indicates:  No Known Allergies  Current Facility-Administered Medications   Medication Frequency    0.9%  NaCl infusion PRN    0.9%  NaCl infusion Once    acetaminophen tablet 650 mg Q4H PRN    aspirin EC tablet 81 mg Daily    calcium acetate(phosphat bind) capsule 667 mg Daily    calcium chloride 1 g in dextrose 5 % 100 mL IVPB PRN    calcium chloride 1 g in dextrose 5 % 100 mL IVPB PRN    calcium chloride 1 g in dextrose 5 % 100 mL IVPB  PRN    chlorhexidine 0.12 % solution 15 mL BID    diltiaZEM 24 hr capsule 180 mg Daily    ipratropium 0.02 % nebulizer solution 0.5 mg Q6H PRN    isosorbide mononitrate 24 hr tablet 30 mg Daily    levalbuterol nebulizer solution 0.63 mg Q6H PRN    losartan tablet 25 mg Daily    magnesium oxide tablet 400 mg Daily    magnesium oxide tablet 800 mg PRN    magnesium sulfate 2g in water 50mL IVPB (premix) PRN    magnesium sulfate 2g in water 50mL IVPB (premix) PRN    magnesium sulfate 2g in water 50mL IVPB (premix) PRN    magnesium sulfate in dextrose IVPB (premix) 1 g PRN    melatonin tablet 6 mg Nightly PRN    metoprolol succinate (TOPROL-XL) 24 hr tablet 25 mg Nightly    mupirocin 2 % ointment BID    ondansetron injection 4 mg Q6H PRN    pantoprazole EC tablet 40 mg Before breakfast    piperacillin-tazobactam 3.375 g in dextrose 5 % 50 mL IVPB (ready to mix system) Q12H    polyethylene glycol packet 17 g BID PRN    potassium chloride 10 mEq in 100 mL IVPB PRN    potassium chloride 10 mEq in 100 mL IVPB PRN    potassium chloride 10 mEq in 100 mL IVPB PRN    potassium chloride 10 mEq in 100 mL IVPB PRN    potassium chloride SA CR tablet 20 mEq PRN    potassium chloride SA CR tablet 20 mEq PRN    potassium chloride SA CR tablet 40 mEq PRN    potassium chloride SA CR tablet 40 mEq PRN    sodium chloride 0.9% flush 10 mL PRN    sodium phosphate 15 mmol in dextrose 5 % 250 mL IVPB PRN    sodium phosphate 15 mmol in dextrose 5 % 250 mL IVPB PRN    sodium phosphate 20.01 mmol in dextrose 5 % 250 mL IVPB PRN    sodium phosphate 20.01 mmol in dextrose 5 % 250 mL IVPB PRN    sodium phosphate 30 mmol in dextrose 5 % 250 mL IVPB PRN    sodium thiosulfate 12.5 gram/50 mL (250 mg/mL) injection 25 g Every Mon, Wed, Fri    vancomycin - pharmacy to dose pharmacy to manage frequency    warfarin (COUMADIN) tablet 2.5 mg Daily     Family History     Problem Relation (Age of Onset)    Arthritis Mother     Diabetes Mother, Father    Early death Sister, Sister    Heart disease Sister, Maternal Grandfather, Mother    Heart failure Mother    Hypertension Mother        Tobacco Use    Smoking status: Current Some Day Smoker     Packs/day: 0.50     Years: 45.00     Pack years: 22.50     Types: Cigarettes    Smokeless tobacco: Never Used   Substance and Sexual Activity    Alcohol use: No    Drug use: No    Sexual activity: Not Currently     Review of Systems   Constitutional: Negative for chills and fever.   Respiratory: Negative.    Cardiovascular: Negative.    Gastrointestinal: Negative.    Skin: Negative.    Neurological: Negative.      Objective:     Vital Signs (Most Recent):  Temp: (!) 101.1 °F (38.4 °C) (11/04/20 1627)  Pulse: 75 (11/04/20 1900)  Resp: 20 (11/04/20 1900)  BP: (!) 114/57 (11/04/20 1900)  SpO2: 98 % (11/04/20 1900)  O2 Device (Oxygen Therapy): nasal cannula (11/04/20 1901) Vital Signs (24h Range):  Temp:  [97.7 °F (36.5 °C)-103.4 °F (39.7 °C)] 101.1 °F (38.4 °C)  Pulse:  [] 75  Resp:  [20-39] 20  SpO2:  [81 %-100 %] 98 %  BP: ()/() 114/57     Weight: 60.8 kg (134 lb 0.6 oz) (11/04/20 1030)  Body mass index is 22.31 kg/m².  Body surface area is 1.67 meters squared.    I/O last 3 completed shifts:  In: 900 [Other:500; IV Piggyback:400]  Out: 3500 [Other:3500]    Physical Exam  Vitals signs and nursing note reviewed.   Constitutional:       General: She is not in acute distress.     Appearance: Normal appearance.   HENT:      Head: Normocephalic and atraumatic.      Nose: Nose normal.      Mouth/Throat:      Mouth: Mucous membranes are moist.   Eyes:      Extraocular Movements: Extraocular movements intact.      Conjunctiva/sclera: Conjunctivae normal.      Pupils: Pupils are equal, round, and reactive to light.   Neck:      Musculoskeletal: Normal range of motion and neck supple.      Comments: No JVD  Cardiovascular:      Rate and Rhythm: Normal rate and regular rhythm.    Pulmonary:      Effort: Pulmonary effort is normal.      Breath sounds: Normal breath sounds.      Comments: Faint rhonchi throughout  Abdominal:      General: Bowel sounds are normal. There is no distension.      Palpations: Abdomen is soft.      Tenderness: There is no abdominal tenderness.   Musculoskeletal:      Right lower leg: No edema.      Left lower leg: No edema.   Skin:     General: Skin is warm and dry.   Neurological:      Mental Status: She is alert and oriented to person, place, and time.   Psychiatric:         Mood and Affect: Mood normal.         Behavior: Behavior normal.         Significant Labs:  CBC:   Recent Labs   Lab 11/04/20  0249   WBC 18.70*   RBC 3.58*   HGB 9.9*   HCT 32.8*   *   MCV 92   MCH 27.7   MCHC 30.2*     CMP:   Recent Labs   Lab 11/04/20 0249   GLU 85   CALCIUM 7.6*   ALBUMIN 2.8*   PROT 7.7      K 5.7*   CO2 25      BUN 40*   CREATININE 6.9*   ALKPHOS 131   ALT 16   AST 34   BILITOT 1.3*     All labs within the past 24 hours have been reviewed.    Significant Imaging:      Assessment/Plan:     Active Diagnoses:    Diagnosis Date Noted POA    PRINCIPAL PROBLEM:  Encephalopathy [G93.40] 01/07/2020 Yes    Acute cystitis [N30.00] 11/04/2020 Yes    Hyperkalemia [E87.5] 11/04/2020 Yes    Atrial fibrillation with rapid ventricular response [I48.91] 10/26/2020 Yes    Right lower lobe pneumonia [J18.9] 10/26/2020 Yes    Calciphylaxis [E83.59] 06/05/2020 Yes    Anemia [D64.9] 03/17/2020 Yes     Chronic    Chronic systolic heart failure [I50.22] 02/03/2020 Yes    HLD (hyperlipidemia) [E78.5] 12/20/2019 Yes     Chronic    Sepsis [A41.9] 09/07/2019 Yes    Anemia due to chronic kidney disease [N18.9, D63.1] 07/25/2017 Yes    ESRD (end stage renal disease) on HD M,W, F [N18.6] 05/11/2016 Yes     Chronic    HTN (hypertension) [I10] 08/15/2013 Yes     Chronic      Problems Resolved During this Admission:     Assessment and plan    Sepsis:  -await sputum and  blood culture  -? Source pneumonia and UTI  -Abx per HM  -lactic acid initially increased but now normalized    Encephalopathy:  -mental clearing  -head CT revealing no acute finding    Right lower lobe pneumonia:  -ABX per HM  -? aspiration (speech following)  -trending x-ray    Acute cystitis:  -ABX per HM  -await culture    CHF:  -compensated  -elevated BNP (baseline)    AFib with RVR:  -heart rate regular (70-80)  -continue home meds  -on Coumadin    ESRD:  -M-W-F HD schedule; HD in progress today  -renal chemistries stable  -strict I&O, daily weight, and avoid nephrotoxins (GFR appropriate)  -await a.m. chemistries    Hyperkalemia:  -titrate HD bath  -monitor closely    Calciphylaxis:  -receiving sodium thiosulfate  -no lesions noted    Anemia:  -slightly under goal  -monitor closely    Renal BMD:  -Ca corrects for hypoalbuminemia  -Check phos in a.m.  -continue binder for now    Hypertension:  -controlled  -continue home meds  -monitor closely      Thank you for your consult. I will follow-up with patient. Please contact us if you have any additional questions.    Gabe Barksdale, KANE  Nephrology  [facility]

## 2020-11-05 NOTE — PROGRESS NOTES
"[facility] Medicine  Progress Note    Patient Name: Griselda Neely  MRN: [id number]  Patient Class: IP- Inpatient   Admission Date: 11/4/2020  2:35 AM  Attending Physician: Darlene Lin MD  Primary Care Provider: Kalie Neely MD  Face-to-Face encounter date: 11/05/2020    Assessment & Plan:   Griselda Neely is a 75 y.o. female with:    Sepsis due to UTI  Recently admitted for pneumonia  - continue zosyn  - out of ICU  - infection workup in progress    On chronic anticoagulation  A-fib  - daily INR   - telemetry  - coumadin, metoprolol, diltiazem    ESRD  - HD  - nephrology following, thank you    Other chronic conditions:  Home meds as appropriate  Electrolyte derangement:  Trending BMP, Mg; Replacement prn  DVT ppx:  Home coumadin  DNR    Discharge Planning:     PT/OT to evaluate.    Subjective:      11/5:  Pt with improving mental status.  Alert, aware, cooperative.  Wants to go home but understands the need for IV abx at this time.  Possible discharge tomorrow.    Review of Systems   All systems reviewed and are negative except as noted per above.  Objective:     Physical Exam  BP (!) 146/69   Pulse 72   Temp 97.6 °F (36.4 °C) (Axillary)   Resp (!) 23   Ht 5' 5" (1.651 m)   Wt 60.8 kg (134 lb 0.6 oz)   LMP  (LMP Unknown)   SpO2 99%   Breastfeeding No   BMI 22.31 kg/m²     Gen: alert, responsive   HEENT:  Eyes - no pallor  External ears with no lesions  Nares patent  Mouth - lips chapped  CV: iRRR  Lungs: COARSE  Abd: +BS, soft, NT, ND  Ext: no edema, +atrophy  Skin: warm, dry  Neuro: grossly intact  Psych: pleasant    Recent Labs   Lab 11/05/20  0246   WBC 14.16*   HGB 8.4*   HCT 27.9*        Recent Labs   Lab 11/05/20  0246   CALCIUM 8.2*  8.2*   ALBUMIN 2.4*     139   K 4.4  4.4   CO2 23  23     100   BUN 31*  31*   CREATININE 5.7*  5.7*     No results found for: POCTGLUCOSE   Microbiology Results (last 7 days)     Procedure Component " Value Units Date/Time    Urine culture [[id number]] Collected: 11/04/20 0307    Order Status: Completed Specimen: Urine Updated: 11/05/20 0901     Urine Culture, Routine No growth to date    Narrative:      Specimen Source->Urine    Blood culture x two cultures. Draw prior to antibiotics. [[id number]] Collected: 11/04/20 0245    Order Status: Completed Specimen: Blood from Peripheral, Forearm, Left Updated: 11/05/20 0432     Blood Culture, Routine No Growth to date      No Growth to date    Narrative:      Aerobic and anaerobic    Blood culture x two cultures. Draw prior to antibiotics. [[id number]] Collected: 11/04/20 0245    Order Status: Completed Specimen: Blood from Peripheral, Antecubital, Left Updated: 11/05/20 0432     Blood Culture, Routine No Growth to date      No Growth to date    Narrative:      Aerobic and anaerobic    Culture, Respiratory with Gram Stain [[id number]]     Order Status: No result Specimen: Respiratory         CT Head Without Contrast   Final Result      X-Ray Chest AP Portable   Final Result          All labs, images, and other studies - including those not included in this note -- were reviewed personally by me.    Inpatient medications  Scheduled Meds:   sodium chloride 0.9%   Intravenous Once    aspirin  81 mg Oral Daily    calcium acetate(phosphat bind)  667 mg Oral Daily    chlorhexidine  15 mL Mouth/Throat BID    diltiaZEM  180 mg Oral Daily    isosorbide mononitrate  30 mg Oral Daily    losartan  25 mg Oral Daily    magnesium oxide  400 mg Oral Daily    metoprolol succinate  25 mg Oral Nightly    mupirocin   Nasal BID    pantoprazole  40 mg Oral Before breakfast    piperacillin-tazobactam (ZOSYN) IVPB  3.375 g Intravenous Q12H    sodium thiosulfate  25 g Intravenous Every Mon, Wed, Fri    warfarin  2.5 mg Oral Daily     Continuous Infusions:  PRN Meds:.sodium chloride 0.9%, acetaminophen, calcium chloride IVPB, calcium chloride IVPB, calcium chloride IVPB, ipratropium,  levalbuterol, magnesium oxide, magnesium sulfate IVPB, magnesium sulfate IVPB, magnesium sulfate IVPB, magnesium sulfate IVPB, melatonin, ondansetron, polyethylene glycol, potassium chloride in water, potassium chloride in water, potassium chloride in water, potassium chloride in water, potassium chloride, potassium chloride, potassium chloride, potassium chloride, sodium chloride 0.9%, sodium phosphate IVPB, sodium phosphate IVPB, sodium phosphate IVPB, sodium phosphate IVPB, sodium phosphate IVPB, Pharmacy to dose Vancomycin consult **AND** vancomycin - pharmacy to dose    Above encounter included review of the medical records, interviewing and examining the patient face-to-face, discussion with family and other health care providers, ordering and interpreting lab/test results and formulating a plan of care.     Darlene Lin MD  [facility] Hospitalist

## 2020-11-05 NOTE — PLAN OF CARE
Important Message from Medicare was sign, explained to patient/caregiver on 11/05/2020 at 1:59pm     answered all questions.    Patient stated she did not need a copy of the IMM

## 2020-11-05 NOTE — PT/OT/SLP EVAL
Speech Language Pathology Evaluation  Bedside Swallow    Patient Name:  Griselda Neely   MRN:  [id number]  Admitting Diagnosis: Encephalopathy    Recommendations:                 General Recommendations:  Dysphagia therapy  Diet recommendations:  Dental Soft(level 7 easy to chew or dental soft), Thin   Aspiration Precautions: 1 bite/sip at a time, Eliminate distractions, Feed only when awake/alert, HOB to 90 degrees, Meds whole 1 at a time, Small bites/sips and Wear oxygen during intake ; wear dentures if possible; monitor chest xrays for negative change from status as compared to 11/4/20 chest xray;  LIQUID IN SMALL, SINGLE SIPS  General Precautions: Standard, fall, aspiration, respiratory  Communication strategies:  none    History:     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    both legs    CHF (congestive heart failure)     Encounter for blood transfusion 03/2016    Gout     Hemodialysis access, AV graft     Hypertension     Mitral valve regurgitation     Osteoarthritis     Pancreatitis     Peripheral vascular disease     Pneumonia 09/09/2017    Renal failure        Past Surgical History:   Procedure Laterality Date    ACHILLES TENDON SURGERY Right     ANGIOGRAPHY OF ARTERIOVENOUS SHUNT Right 1/7/2020    Procedure: Fistulogram with Possible Intervention;  Surgeon: Joseph Loyola MD;  Location: [facility] CATH/EP LAB;  Service: Cardiology;  Laterality: Right;    ANGIOGRAPHY OF LOWER EXTREMITY Left 3/18/2020    Procedure: Angiogram Extremity Unilateral;  Surgeon: Ali Khoobehi, MD;  Location: [facility] CATH/EP LAB;  Service: Cardiology;  Laterality: Left;    APPENDECTOMY      CARDIAC CATHETERIZATION  07/03/2017    CHOLECYSTECTOMY      COLONOSCOPY Left 10/4/2020    Procedure: COLONOSCOPY;  Surgeon: Jordan Kilpatrick MD;  Location: [facility] ENDO;  Service: Endoscopy;  Laterality: Left;    ESOPHAGOGASTRODUODENOSCOPY Left 8/17/2020    Procedure: EGD  (ESOPHAGOGASTRODUODENOSCOPY);  Surgeon: Talat Trevizo MD;  Location: [facility] ENDO;  Service: Endoscopy;  Laterality: Left;    ESOPHAGOGASTRODUODENOSCOPY Left 10/2/2020    Procedure: EGD (ESOPHAGOGASTRODUODENOSCOPY);  Surgeon: Talat Trevizo MD;  Location: [facility] ENDO;  Service: Endoscopy;  Laterality: Left;    heal surgery Right     HYSTERECTOMY      INSERTION OF STENT INTO PERIPHERAL VESSEL N/A 1/7/2020    Procedure: INSERTION, STENT, VESSEL, PERIPHERAL;  Surgeon: Joseph Loyola MD;  Location: [facility] CATH/EP LAB;  Service: Cardiology;  Laterality: N/A;    MITRAL VALVE REPLACEMENT      PARATHYROIDECTOMY      PARATHYROIDECTOMY  07/13/2017    PERCUTANEOUS TRANSLUMINAL ANGIOPLASTY OF ARTERIOVENOUS FISTULA N/A 1/7/2020    Procedure: PTA, AV FISTULA;  Surgeon: Joseph Loyola MD;  Location: [facility] CATH/EP LAB;  Service: Cardiology;  Laterality: N/A;    PERITONEAL CATHETER INSERTION      RENAL BIOPSY      vocal cord nodule      WOUND DEBRIDEMENT Left 7/13/2020    Procedure: DEBRIDEMENT, WOUND;  Surgeon: Carlos Elam III, MD;  Location: [facility] OR;  Service: General;  Laterality: Left;       Social History: deferred.    Prior Intubation HX:  Not associated with this admission    Modified Barium Swallow: n/a at this time    Chest X-Rays:    Imaging Results          CT Head Without Contrast (Final result)  Result time 11/04/20 02:44:00    Final result by Lesly Alfaro MD (11/04/20 02:44:00)                 Narrative:    EXAM:  CT Head Without Intravenous Contrast    CLINICAL HISTORY:  The patient is 75 years old and is Female; Altered mental status; ams    TECHNIQUE:  Axial computed tomography images of the head/brain without intravenous contrast.  Sagittal and coronal reformatted images were created and reviewed.  This CT exam was performed using one or more of the following dose reduction techniques:  automated exposure control, adjustment of the mA and/or kV according to patient size, and/or use of  iterative reconstruction technique.    COMPARISON:  CT of the head September 1, 2020    FINDINGS:  ARTIFACTS:  The exam is suboptimal secondary to motion artifact.  BRAIN:  Evidence of prior infarct in the region of the right basal ganglia is noted. There is diffuse cerebral atrophy present, consistent with this patient's age.  There is patchy hypoattenuation of the deep white matter which is non-specific, but most likely owing to chronic small vessel ischemic change in a patient of this age group.  No intracranial hemorrhage, mass effect, or midline shift is seen. There are no extra-axial fluid collections.  VENTRICLES:  Unremarkable.  No ventriculomegaly.  BONES/JOINTS:  No acute fracture.  SOFT TISSUES:  Unremarkable.  SINUSES:  Unremarkable as visualized.  No acute sinusitis.  MASTOID AIR CELLS:  Unremarkable as visualized.  No mastoid effusion.  ORBITS:  Unremarkable as visualized.    IMPRESSION:  Age-related atrophy and chronic white matter ischemic changes, with no evidence of an acute intracranial abnormality.    Electronically signed by:  Lesly Alfaro MD  11/4/2020 3:47 AM CST Workstation: [id number]                             X-Ray Chest AP Portable (Final result)  Result time 11/04/20 02:58:17    Final result by Camacho Arce IV, MD (11/04/20 02:58:17)                 Narrative:    Chest, single view    HISTORY: Sepsis.    Comparison is made to 10/26/2020.    The cardiac silhouette is enlarged but stable. Central pulmonary  vascular prominence is unchanged. There are postoperative changes and  arteriosclerosis.    Diffuse interstitial and groundglass type pulmonary opacities persist  within the lungs. There is more confluent airspace opacity or volume  loss in the right lung base, slightly improved in the interval. Minor  blunting of the right costophrenic angle is unchanged. Monitoring  electrodes overlie the chest. Urographic displaced fracture of the  lateral aspect of the right sixth rib is  "noted.    IMPRESSION:    Persistence of bilateral interstitial and groundglass opacities with  more confluent airspace disease in the right lung base.    Stable cardiomegaly.    Unchanged blunting of right costophrenic angle.    Additional observations as above.    Electronically Signed by Camacho Arce M.D. on 11/4/2020 6:51 AM                                  Prior diet: pt denies any restrictions.    Occupation/hobbies/homemaking: deferred.    Subjective     "who ordered this?"  Patient goals: none stated; wants take out food.     Pain/Comfort:  · Pain Rating 1: 0/10    Objective:     Oral Musculature Evaluation  · Oral Musculature: WFL  · Dentition: upper and lower dentures  · Secretion Management: adequate  · Mucosal Quality: adequate  · Mandibular Strength and Mobility: WFL  · Oral Labial Strength and Mobility: WFL  · Lingual Strength and Mobility: WFL  · Velar Elevation: other (see comments)(deferred)  · Buccal Strength and Mobility: other (see comments)(did not follow instruction to smile (didn't want to))  · Volitional Cough: present  · Volitional Swallow: present  · Voice Prior to PO Intake: WFL dry and clear    Bedside Swallow Eval:   Consistencies Assessed:  · Thin liquids (ice chips, water by spoon, cup sip, straw sips for total intake of liquid approximately 2 1/2 oz -- pt stated she was on liquid restriction and declined further intake of water)  · Puree (pudding x 3 tsp)  · Soft solids (soft sweet potato fries x 2)   · Solids (half a hamburger with bun brought in by sister)    Oral Phase:   · WFL    Pharyngeal Phase:   · ice -- no issues   · Water by spoon and straw -- no sign of aspiration  · Water by cup sip -- cough after one of 3 cup sips (voice dry -- it was a larger cup sip)    Compensatory Strategies  · small sips    Treatment: educated pt and her sister regarding recommended swallow safety precautions; sister verbalized understanding; no comment by pt but I believe she understood " recommendations.    Assessment:     Griselda Neely is a 75 y.o. female referred to SLP for assessment of swallow.  She tolerated solids and puree well.  Overall she tolerated liquids functionally, although demonstrating one small cough after one larger sip.  She is alert and verbal at conversation level with strong volitional cough.  Recommendations as above. She stated she will not eat any hospital food and is reportedly only eating food brought in by sister.    Goals:   Multidisciplinary Problems     SLP Goals        Problem: SLP Goal    Goal Priority Disciplines Outcome   SLP Goal     SLP    Description: 1. Tolerate dental soft diet with thin liquid (preferably with dentures in place), following swallow safety precautions 80%, min cues.                   Plan:     · Patient to be seen:  3 x/week   · Plan of Care expires:  11/26/20  · Plan of Care reviewed with:  patient, sibling, other (see comments)(nurse)   · SLP Follow-Up:  Yes       Discharge recommendations:  other (see comments)(to be determined)   Barriers to Discharge:  to be determined    Time Tracking:     SLP Treatment Date:   11/05/20  Speech Start Time:  1150  Speech Stop Time:  1213     Speech Total Time (min):  23 min    Billable Minutes: Eval Swallow and Oral Function 23    Sidra Yañez CCC-SLP  11/05/2020

## 2020-11-05 NOTE — PLAN OF CARE
Pt allowed evaluation.  SLP will recommend dental soft due to pt aversion to keeping her dentures in mouth.  General swallow safety precautions; slow rate. Will follow.

## 2020-11-05 NOTE — PROGRESS NOTES
Today's H&P reviewed; I agree with assessment and plan.  Pt improving since admission.  Stable for transfer to floor.  Continue current management otherwise.

## 2020-11-05 NOTE — NURSING TRANSFER
Nursing Transfer Note      11/5/2020     Transfer To: 1208    Transfer via wheelchair    Transfer with 3L NC to O2, cardiac monitoring    Transported by YASEMIN Guerra RN    Medicines sent: None    Chart send with patient: Yes    Notified: sister at bedside    Patient reassessed at: 11/05/2020 at 1200    Upon arrival to floor: patient oriented to room, call bell in reach and bed in lowest position.  TYRONE Umanzor at bedside

## 2020-11-05 NOTE — PLAN OF CARE
Plan of care reviewed and patient verbalized understanding. Will continue to update patient on plan of care.

## 2020-11-06 VITALS
HEART RATE: 61 BPM | SYSTOLIC BLOOD PRESSURE: 133 MMHG | HEIGHT: 65 IN | RESPIRATION RATE: 20 BRPM | BODY MASS INDEX: 22.34 KG/M2 | WEIGHT: 134.06 LBS | TEMPERATURE: 98 F | DIASTOLIC BLOOD PRESSURE: 64 MMHG | OXYGEN SATURATION: 99 %

## 2020-11-06 PROBLEM — I48.91 ATRIAL FIBRILLATION WITH RAPID VENTRICULAR RESPONSE: Status: RESOLVED | Noted: 2020-10-26 | Resolved: 2020-11-06

## 2020-11-06 PROBLEM — J18.9 RIGHT LOWER LOBE PNEUMONIA: Status: RESOLVED | Noted: 2020-10-26 | Resolved: 2020-11-06

## 2020-11-06 PROBLEM — G93.40 ENCEPHALOPATHY: Status: RESOLVED | Noted: 2020-01-07 | Resolved: 2020-11-06

## 2020-11-06 PROBLEM — I51.3 ATRIAL THROMBUS: Status: RESOLVED | Noted: 2020-09-05 | Resolved: 2020-11-06

## 2020-11-06 PROBLEM — E87.5 HYPERKALEMIA: Status: RESOLVED | Noted: 2020-11-04 | Resolved: 2020-11-06

## 2020-11-06 PROBLEM — N30.00 ACUTE CYSTITIS: Status: RESOLVED | Noted: 2020-11-04 | Resolved: 2020-11-06

## 2020-11-06 PROBLEM — A41.9 SEPSIS: Status: RESOLVED | Noted: 2019-09-07 | Resolved: 2020-11-06

## 2020-11-06 PROBLEM — K92.2 LOWER GI BLEED: Status: RESOLVED | Noted: 2020-08-17 | Resolved: 2020-11-06

## 2020-11-06 PROBLEM — E87.70 VOLUME OVERLOAD: Status: RESOLVED | Noted: 2020-06-05 | Resolved: 2020-11-06

## 2020-11-06 PROBLEM — G93.49 ENCEPHALOPATHY CHRONIC: Status: RESOLVED | Noted: 2020-06-05 | Resolved: 2020-11-06

## 2020-11-06 PROBLEM — J96.20 ACUTE ON CHRONIC RESPIRATORY FAILURE: Status: RESOLVED | Noted: 2019-05-13 | Resolved: 2020-11-06

## 2020-11-06 LAB
ALBUMIN SERPL BCP-MCNC: 2.4 G/DL (ref 3.5–5.2)
ANION GAP SERPL CALC-SCNC: 16 MMOL/L (ref 8–16)
ANION GAP SERPL CALC-SCNC: 16 MMOL/L (ref 8–16)
BACTERIA UR CULT: NO GROWTH
BASOPHILS # BLD AUTO: 0.02 K/UL (ref 0–0.2)
BASOPHILS NFR BLD: 0.2 % (ref 0–1.9)
BUN SERPL-MCNC: 45 MG/DL (ref 8–23)
BUN SERPL-MCNC: 45 MG/DL (ref 8–23)
CALCIUM SERPL-MCNC: 7.6 MG/DL (ref 8.7–10.5)
CALCIUM SERPL-MCNC: 7.6 MG/DL (ref 8.7–10.5)
CHLORIDE SERPL-SCNC: 95 MMOL/L (ref 95–110)
CHLORIDE SERPL-SCNC: 95 MMOL/L (ref 95–110)
CO2 SERPL-SCNC: 23 MMOL/L (ref 23–29)
CO2 SERPL-SCNC: 23 MMOL/L (ref 23–29)
CREAT SERPL-MCNC: 7 MG/DL (ref 0.5–1.4)
CREAT SERPL-MCNC: 7 MG/DL (ref 0.5–1.4)
DIFFERENTIAL METHOD: ABNORMAL
EOSINOPHIL # BLD AUTO: 0.1 K/UL (ref 0–0.5)
EOSINOPHIL NFR BLD: 1.4 % (ref 0–8)
ERYTHROCYTE [DISTWIDTH] IN BLOOD BY AUTOMATED COUNT: 17.8 % (ref 11.5–14.5)
EST. GFR  (AFRICAN AMERICAN): 6.1 ML/MIN/1.73 M^2
EST. GFR  (AFRICAN AMERICAN): 6.1 ML/MIN/1.73 M^2
EST. GFR  (NON AFRICAN AMERICAN): 5.3 ML/MIN/1.73 M^2
EST. GFR  (NON AFRICAN AMERICAN): 5.3 ML/MIN/1.73 M^2
GLUCOSE SERPL-MCNC: 66 MG/DL (ref 70–110)
GLUCOSE SERPL-MCNC: 66 MG/DL (ref 70–110)
HCT VFR BLD AUTO: 27.7 % (ref 37–48.5)
HGB BLD-MCNC: 8.4 G/DL (ref 12–16)
IMM GRANULOCYTES # BLD AUTO: 0.03 K/UL (ref 0–0.04)
IMM GRANULOCYTES NFR BLD AUTO: 0.3 % (ref 0–0.5)
LYMPHOCYTES # BLD AUTO: 0.7 K/UL (ref 1–4.8)
LYMPHOCYTES NFR BLD: 7.9 % (ref 18–48)
MAGNESIUM SERPL-MCNC: 1.9 MG/DL (ref 1.6–2.6)
MCH RBC QN AUTO: 26.9 PG (ref 27–31)
MCHC RBC AUTO-ENTMCNC: 30.3 G/DL (ref 32–36)
MCV RBC AUTO: 89 FL (ref 82–98)
MONOCYTES # BLD AUTO: 0.5 K/UL (ref 0.3–1)
MONOCYTES NFR BLD: 5.4 % (ref 4–15)
NEUTROPHILS # BLD AUTO: 7.5 K/UL (ref 1.8–7.7)
NEUTROPHILS NFR BLD: 84.8 % (ref 38–73)
NRBC BLD-RTO: 0 /100 WBC
PHOSPHATE SERPL-MCNC: 4.7 MG/DL (ref 2.7–4.5)
PHOSPHATE SERPL-MCNC: 4.7 MG/DL (ref 2.7–4.5)
PLATELET # BLD AUTO: 292 K/UL (ref 150–350)
PMV BLD AUTO: 10.9 FL (ref 9.2–12.9)
POTASSIUM SERPL-SCNC: 4.2 MMOL/L (ref 3.5–5.1)
POTASSIUM SERPL-SCNC: 4.2 MMOL/L (ref 3.5–5.1)
RBC # BLD AUTO: 3.12 M/UL (ref 4–5.4)
SODIUM SERPL-SCNC: 134 MMOL/L (ref 136–145)
SODIUM SERPL-SCNC: 134 MMOL/L (ref 136–145)
VANCOMYCIN SERPL-MCNC: 16.3 UG/ML
WBC # BLD AUTO: 8.87 K/UL (ref 3.9–12.7)

## 2020-11-06 PROCEDURE — 63600175 PHARM REV CODE 636 W HCPCS: Performed by: FAMILY MEDICINE

## 2020-11-06 PROCEDURE — 80069 RENAL FUNCTION PANEL: CPT

## 2020-11-06 PROCEDURE — 36415 COLL VENOUS BLD VENIPUNCTURE: CPT

## 2020-11-06 PROCEDURE — 83735 ASSAY OF MAGNESIUM: CPT

## 2020-11-06 PROCEDURE — 80202 ASSAY OF VANCOMYCIN: CPT

## 2020-11-06 PROCEDURE — 85025 COMPLETE CBC W/AUTO DIFF WBC: CPT

## 2020-11-06 PROCEDURE — 25000003 PHARM REV CODE 250: Performed by: FAMILY MEDICINE

## 2020-11-06 RX ORDER — AMOXICILLIN AND CLAVULANATE POTASSIUM 500; 125 MG/1; MG/1
1 TABLET, FILM COATED ORAL ONCE
Status: COMPLETED | OUTPATIENT
Start: 2020-11-06 | End: 2020-11-06

## 2020-11-06 RX ORDER — AMOXICILLIN AND CLAVULANATE POTASSIUM 500; 125 MG/1; MG/1
1 TABLET, FILM COATED ORAL NIGHTLY
Qty: 7 TABLET | Refills: 0 | Status: ON HOLD | OUTPATIENT
Start: 2020-11-06 | End: 2020-11-10 | Stop reason: SDUPTHER

## 2020-11-06 RX ADMIN — PIPERACILLIN SODIUM AND TAZOBACTAM SODIUM 3.38 G: 3; .375 INJECTION, POWDER, LYOPHILIZED, FOR SOLUTION INTRAVENOUS at 02:11

## 2020-11-06 RX ADMIN — AMOXICILLIN AND CLAVULANATE POTASSIUM 500 MG: 500; 125 TABLET, FILM COATED ORAL at 09:11

## 2020-11-06 RX ADMIN — PANTOPRAZOLE SODIUM 40 MG: 40 TABLET, DELAYED RELEASE ORAL at 05:11

## 2020-11-06 NOTE — DISCHARGE SUMMARY
Counts include 234 beds at the [facility]  Discharge Summary      Patient Name: Griselda Neely  MRN: [id number]  Admission Date: 11/4/2020  Discharge Date and Time:  11/07/2020 8:13 PM  Discharging Provider: Darlene Lin MD  Primary Care Provider: Kalie Neely MD    HPI:   75 year old female with PMHx ESRD on HD, parox Afib on Coumadin, Chronic HF, PVD presents to the ED with encephalopathy.  Patient has no memory of tonight's events.  History supplemented by ED MD and chart.  Patient recently admitted with fever and encephalopathy 10/26.  CXR revealing of pnuemonia.  She was also treated for Afib with RVR during that hospital stay.  Patient discharged 10/29 after declining further treatment and was discharged on Augmentin.  Patient reports she took Augmentin as prescribed and had been feeling well at home other than a cough with clear sputum. No fevers or chills.  Per ED MD, patient's family noted she went to bed in her usual state of health but was later found on the floor confused at approximately 0200.  In the ED she was noted to have a 103 fever and was in Afib with RVR.  Rate has improved with improvement in temperature. RN reports patient quite confused on presentation but is now improved from a mentation standpoint.  She is able to answer my orientation questions of name, location, year though initially tells me she already answered these questions. She does not recall being confused or being found on the floor.  In the ED, breath sounds are quite rhonchus. She reports she does not really make urine any more at home.     * No surgery found *      Hospital Course:     Pt admitted for:     Sepsis (resolved) due to UTI (improved)  Recently admitted for pneumonia, resolved  - continue zosyn, transitioning to augmentin at discharge  - discharge today after HD     On chronic anticoagulation  A-fib, stable  - daily INR   - telemetry  - coumadin, metoprolol, diltiazem     ESRD  - HD  - nephrology  "following, thank you    Pt discharged on augmentin.  One dose given before discharge.  Pt scheduled to have HD at noon at her regular chair today.  Pt instructed to take her first dose of augmentin on outpt basis this evening.      Pt improved during inpt stay.  Pt received maximum benefit to inpt stay.  Patient was seen and examined on the date of discharge and determined to be suitable for discharge.    /69 (BP Location: Left arm, Patient Position: Lying)   Pulse 77   Temp 97.8 °F (36.6 °C)   Resp 16   Ht 5' 5" (1.651 m)   Wt 60.8 kg (134 lb 0.6 oz)   LMP  (LMP Unknown)   SpO2 100%   Breastfeeding No   BMI 22.31 kg/m²   Gen: alert, responsive   HEENT:  Eyes - no pallor  External ears with no lesions  Nares patent  Mouth - lips chapped  CV: iRRR  Lungs: COARSE  Abd: +BS, soft, NT, ND  Ext: no edema, +atrophy  Skin: warm, dry  Neuro: grossly intact  Psych: pleasant    Final Active Diagnoses:    Diagnosis Date Noted POA    PRINCIPAL PROBLEM:  ESRD (end stage renal disease) on HD M,W, F [N18.6] 05/11/2016 Yes     Chronic    Calciphylaxis [E83.59] 06/05/2020 Yes    Anemia [D64.9] 03/17/2020 Yes     Chronic    Chronic systolic heart failure [I50.22] 02/03/2020 Yes    HLD (hyperlipidemia) [E78.5] 12/20/2019 Yes     Chronic    Anemia due to chronic kidney disease [N18.9, D63.1] 07/25/2017 Yes    HTN (hypertension) [I10] 08/15/2013 Yes     Chronic      Problems Resolved During this Admission:    Diagnosis Date Noted Date Resolved POA    Acute cystitis [N30.00] 11/04/2020 11/06/2020 Yes    Hyperkalemia [E87.5] 11/04/2020 11/06/2020 Yes    Atrial fibrillation with rapid ventricular response [I48.91] 10/26/2020 11/06/2020 Yes    Right lower lobe pneumonia [J18.9] 10/26/2020 11/06/2020 Yes    Encephalopathy [G93.40] 01/07/2020 11/06/2020 Yes    Sepsis [A41.9] 09/07/2019 11/06/2020 Yes       Discharged Condition: stable    Disposition: Home-Health Care Svc    Follow Up:  Contact information for " after-discharge care     Home Medical Care     [facility] .    Service: Home Health Services  Contact information:  [address] [place] [facility], [address]  [facility] [id number] [phone]                     Patient Instructions:      Ambulatory referral/consult to Home Health   Standing Status: Future   Referral Priority: Routine Referral Type: Home Health   Referral Reason: Specialty Services Required   Requested Specialty: Home Health Services   Number of Visits Requested: 1     Pending Diagnostic Studies:     None         Medications:  Reconciled Home Medications:      Medication List      START taking these medications    amoxicillin-clavulanate 500-125mg 500-125 mg Tab  Commonly known as: AUGMENTIN  Take 1 tablet (500 mg total) by mouth every evening. Take AFTER DIALYSIS on dialysis days. for 7 days  Replaces: amoxicillin-clavulanate 250-125mg 250-125 mg Tab        CONTINUE taking these medications    aspirin 81 MG EC tablet  Commonly known as: ECOTRIN  Take 1 tablet (81 mg total) by mouth once daily.     calcium acetate(phosphat bind) 667 mg capsule  Commonly known as: PHOSLO  Take 667 mg by mouth once daily.     diltiaZEM 180 MG 24 hr capsule  Commonly known as: CARDIZEM CD  Take 1 capsule by mouth once daily     isosorbide mononitrate 30 MG 24 hr tablet  Commonly known as: IMDUR  Take 30 mg by mouth once daily.     losartan 25 MG tablet  Commonly known as: COZAAR  Take 25 mg by mouth once daily.     magnesium oxide 400 mg (241.3 mg magnesium) tablet  Commonly known as: MAG-OX  Take 1 tablet by mouth once daily     metoprolol succinate 25 MG 24 hr tablet  Commonly known as: TOPROL-XL  Take 1 tablet (25 mg total) by mouth nightly.     ondansetron 4 MG Tbdl  Commonly known as: ZOFRAN-ODT  Take 4 mg by mouth every 6 (six) hours as needed.     oxyCODONE-acetaminophen 5-325 mg per tablet  Commonly known as: PERCOCET  Take 1 tablet by mouth every 6 (six) hours as needed for Pain.      pantoprazole 40 MG tablet  Commonly known as: PROTONIX  Take 1 tablet (40 mg total) by mouth once daily.     JENNIFER-KODAK ORAL  Take 1 tablet by mouth once daily.     warfarin 2.5 MG tablet  Commonly known as: COUMADIN  Take 2.5 mg by mouth once daily.     wheelchair An  1 Device by Misc.(Non-Drug; Combo Route) route as needed (needed for transport).     zinc sulfate 220 mg Tab tablet  Take 220 mg by mouth every other day.        STOP taking these medications    amoxicillin-clavulanate 250-125mg 250-125 mg Tab  Commonly known as: AUGMENTIN  Replaced by: amoxicillin-clavulanate 500-125mg 500-125 mg Tab          Darlene Lin MD  Department of Hospital Medicine  [facility]

## 2020-11-06 NOTE — NURSING
Patient discharged home with family. Discharge instructions given to patient and understanding demonstrated. Belongings such as cell phone, , slippers, and robe on patient self at discharge.

## 2020-11-06 NOTE — RESPIRATORY THERAPY
11/05/20 2335   Patient Assessment/Suction   Level of Consciousness (AVPU)   (sleep)   Respiratory Effort Unlabored   Expansion/Accessory Muscles/Retractions expansion symmetric;no retractions;no use of accessory muscles   All Lung Fields Breath Sounds diminished;clear   Rhythm/Pattern, Respiratory no shortness of breath reported;pattern regular;unlabored   PRE-TX-O2   O2 Device (Oxygen Therapy) room air   SpO2 96 %   Pulse Oximetry Type Intermittent   $ Pulse Oximetry - Multiple Charge Pulse Oximetry - Multiple   Aerosol Therapy   $ Aerosol Therapy Charges PRN treatment not required   Respiratory Evaluation   $ Care Plan Tech Time 15 min   Evaluation For   (care plan)        11/05/20 2335   Patient Assessment/Suction   Level of Consciousness (AVPU)   (sleep)   Respiratory Effort Unlabored   Expansion/Accessory Muscles/Retractions expansion symmetric;no retractions;no use of accessory muscles   All Lung Fields Breath Sounds diminished;clear   Rhythm/Pattern, Respiratory no shortness of breath reported;pattern regular;unlabored   PRE-TX-O2   O2 Device (Oxygen Therapy) room air   SpO2 96 %   Pulse Oximetry Type Intermittent   $ Pulse Oximetry - Multiple Charge Pulse Oximetry - Multiple   Aerosol Therapy   $ Aerosol Therapy Charges PRN treatment not required   Respiratory Evaluation   $ Care Plan Tech Time 15 min   Evaluation For   (care plan)

## 2020-11-06 NOTE — PROGRESS NOTES
[facility]  Nephrology  Progress Note    Patient Name: Griselda Neely  MRN: [id number]  Admission Date: 11/4/2020  Hospital Length of Stay: 1 days  Attending Provider: Darlene Lin MD   Primary Care Physician: Kalie Neely MD  Principal Problem:Encephalopathy    Consults  Subjective:     Interval History:   Sitting up in bed; alert and oriented.  Feeling better; perseverating about going home  No acute events overnight    Review of patient's allergies indicates:  No Known Allergies  Current Facility-Administered Medications   Medication Frequency    0.9%  NaCl infusion PRN    0.9%  NaCl infusion Once    acetaminophen tablet 650 mg Q4H PRN    aspirin EC tablet 81 mg Daily    calcium acetate(phosphat bind) capsule 667 mg Daily    calcium chloride 1 g in dextrose 5 % 100 mL IVPB PRN    calcium chloride 1 g in dextrose 5 % 100 mL IVPB PRN    calcium chloride 1 g in dextrose 5 % 100 mL IVPB PRN    chlorhexidine 0.12 % solution 15 mL BID    diltiaZEM 24 hr capsule 180 mg Daily    ipratropium 0.02 % nebulizer solution 0.5 mg Q6H PRN    isosorbide mononitrate 24 hr tablet 30 mg Daily    levalbuterol nebulizer solution 0.63 mg Q6H PRN    losartan tablet 25 mg Daily    magnesium oxide tablet 400 mg Daily    magnesium oxide tablet 800 mg PRN    magnesium sulfate 2g in water 50mL IVPB (premix) PRN    magnesium sulfate 2g in water 50mL IVPB (premix) PRN    magnesium sulfate 2g in water 50mL IVPB (premix) PRN    magnesium sulfate in dextrose IVPB (premix) 1 g PRN    melatonin tablet 6 mg Nightly PRN    metoprolol succinate (TOPROL-XL) 24 hr tablet 25 mg Nightly    mupirocin 2 % ointment BID    ondansetron injection 4 mg Q6H PRN    pantoprazole EC tablet 40 mg Before breakfast    piperacillin-tazobactam 3.375 g in dextrose 5 % 50 mL IVPB (ready to mix system) Q12H    polyethylene glycol packet 17 g BID PRN    potassium chloride 10 mEq in 100 mL IVPB PRN    potassium chloride  10 mEq in 100 mL IVPB PRN    potassium chloride 10 mEq in 100 mL IVPB PRN    potassium chloride 10 mEq in 100 mL IVPB PRN    potassium chloride SA CR tablet 20 mEq PRN    potassium chloride SA CR tablet 20 mEq PRN    potassium chloride SA CR tablet 40 mEq PRN    potassium chloride SA CR tablet 40 mEq PRN    sodium chloride 0.9% flush 10 mL PRN    sodium phosphate 15 mmol in dextrose 5 % 250 mL IVPB PRN    sodium phosphate 15 mmol in dextrose 5 % 250 mL IVPB PRN    sodium phosphate 20.01 mmol in dextrose 5 % 250 mL IVPB PRN    sodium phosphate 20.01 mmol in dextrose 5 % 250 mL IVPB PRN    sodium phosphate 30 mmol in dextrose 5 % 250 mL IVPB PRN    sodium thiosulfate 12.5 gram/50 mL (250 mg/mL) injection 25 g Every Mon, Wed, Fri    warfarin (COUMADIN) tablet 2.5 mg Daily       Objective:     Vital Signs (Most Recent):  Temp: 98.3 °F (36.8 °C) (11/05/20 1939)  Pulse: 69 (11/05/20 1939)  Resp: 16 (11/05/20 1939)  BP: (!) 144/65 (11/05/20 1939)  SpO2: 98 % (11/05/20 1939)  O2 Device (Oxygen Therapy): nasal cannula (11/05/20 1905) Vital Signs (24h Range):  Temp:  [97.6 °F (36.4 °C)-98.4 °F (36.9 °C)] 98.3 °F (36.8 °C)  Pulse:  [67-84] 69  Resp:  [16-31] 16  SpO2:  [92 %-100 %] 98 %  BP: (112-167)/(55-86) 144/65     Weight: 60.8 kg (134 lb 0.6 oz) (11/04/20 1030)  Body mass index is 22.31 kg/m².  Body surface area is 1.67 meters squared.    I/O last 3 completed shifts:  In: 960 [P.O.:360; Other:500; IV Piggyback:100]  Out: 3500 [Other:3500]    Physical Exam     Physical Exam  Vitals signs and nursing note reviewed.   Constitutional:       General: She is not in acute distress.     Appearance: Normal appearance.   HENT:      Head: Normocephalic and atraumatic.      Nose: Nose normal.      Mouth/Throat:      Mouth: Mucous membranes are moist.   Eyes:      Extraocular Movements: Extraocular movements intact.      Conjunctiva/sclera: Conjunctivae normal.      Pupils: Pupils are equal, round, and reactive to  light.   Neck:      Musculoskeletal: Normal range of motion and neck supple.      Comments: No JVD  Cardiovascular:      Rate and Rhythm: Normal rate and irregular rhythm    Pulmonary:      Effort: Pulmonary effort is normal.      Breath sounds: Normal breath sounds.      Comments: Faint rhonchi throughout  Abdominal:      General: Bowel sounds are normal. There is no distension.      Palpations: Abdomen is soft.      Tenderness: There is no abdominal tenderness.   Musculoskeletal:      Right lower leg: No edema.      Left lower leg: No edema.   Skin:     General: Skin is warm and dry.   Neurological:      Mental Status: She is alert and oriented to person, place, and time.   Psychiatric:         Mood and Affect: Mood normal.         Behavior: Behavior normal.        Significant Labs:sure  CBC:   Recent Labs   Lab 11/05/20 0246   WBC 14.16*   RBC 3.09*   HGB 8.4*   HCT 27.9*      MCV 90   MCH 27.2   MCHC 30.1*     CMP:   Recent Labs   Lab 11/04/20 0249 11/05/20 0246   GLU 85 72  72   CALCIUM 7.6* 8.2*  8.2*   ALBUMIN 2.8* 2.4*   PROT 7.7  --     139  139   K 5.7* 4.4  4.4   CO2 25 23  23    100  100   BUN 40* 31*  31*   CREATININE 6.9* 5.7*  5.7*   ALKPHOS 131  --    ALT 16  --    AST 34  --    BILITOT 1.3*  --      All labs within the past 24 hours have been reviewed.    Significant Imaging:      Assessment/Plan:     Active Diagnoses:    Diagnosis Date Noted POA    PRINCIPAL PROBLEM:  Encephalopathy [G93.40] 01/07/2020 Yes    Acute cystitis [N30.00] 11/04/2020 Yes    Hyperkalemia [E87.5] 11/04/2020 Yes    Atrial fibrillation with rapid ventricular response [I48.91] 10/26/2020 Yes    Right lower lobe pneumonia [J18.9] 10/26/2020 Yes    Calciphylaxis [E83.59] 06/05/2020 Yes    Anemia [D64.9] 03/17/2020 Yes     Chronic    Chronic systolic heart failure [I50.22] 02/03/2020 Yes    HLD (hyperlipidemia) [E78.5] 12/20/2019 Yes     Chronic    Sepsis [A41.9] 09/07/2019 Yes    Anemia  due to chronic kidney disease [N18.9, D63.1] 07/25/2017 Yes    ESRD (end stage renal disease) on HD M,W, F [N18.6] 05/11/2016 Yes     Chronic    HTN (hypertension) [I10] 08/15/2013 Yes     Chronic      Problems Resolved During this Admission:     Assessment and plan     Sepsis:  -most likely secondary to UTI  -clinically improving  -awaiting infection workup  -antibiotics per      Encephalopathy:  -resolved     CHF:  -compensated  -elevated BNP (baseline)     AFib with RVR:  -heart rate regular (70-80)  -continue home meds  -on Coumadin     ESRD:  -M-W-F HD schedule  -renal chemistries stable  -strict I&O, daily weight, and avoid nephrotoxins (GFR appropriate)  -await a.m. chemistries     Calciphylaxis:  -Sodium thiosulfate  -no lesions noted     Anemia:  -slightly under goal  -monitor closely     Renal BMD:  -Ca corrects for hypoalbuminemia  -Check phos in a.m.  -continue binder for now     Hypertension:  -controlled  -continue home meds  -monitor closely      Gabe Barksdale NP  Nephrology  [facility]

## 2020-11-06 NOTE — PLAN OF CARE
Discharged home with [facility]. Sent via [employer]; call placed; spoke with Jackelin and services will be restarted.        11/06/20 0910   Final Note   Assessment Type Final Discharge Note   Anticipated Discharge Disposition Home-Health   Post-Acute Status   Post-Acute Authorization Home Health   Home Health Status Set-up Complete   Patient choice form signed by patient/caregiver   (Preference - continue with [facility])

## 2020-11-06 NOTE — CARE UPDATE
Spoke with Dr. Ingram, he is okay with pt going to outpatient clinic for treatment. Spoke with TYRONE Escobar said pt would be d/c and will go to clinic via sister for transportation. Spoke with TYRONE Elias-manager at [facility] and stated they would take pt at 12:00PM today.

## 2020-11-07 ENCOUNTER — HOSPITAL ENCOUNTER (INPATIENT)
Facility: HOSPITAL | Age: 75
LOS: 3 days | Discharge: HOME HEALTH | DRG: 377 | End: 2020-11-10
Attending: EMERGENCY MEDICINE | Admitting: FAMILY MEDICINE
Payer: MEDICARE

## 2020-11-07 DIAGNOSIS — I48.91 ATRIAL FIBRILLATION: ICD-10-CM

## 2020-11-07 DIAGNOSIS — D62 ACUTE BLOOD LOSS ANEMIA: ICD-10-CM

## 2020-11-07 DIAGNOSIS — K62.5 RECTAL BLEEDING: ICD-10-CM

## 2020-11-07 DIAGNOSIS — N18.6 STAGE 5 CHRONIC KIDNEY DISEASE ON CHRONIC DIALYSIS: Primary | ICD-10-CM

## 2020-11-07 DIAGNOSIS — I51.3 ATRIAL THROMBUS: ICD-10-CM

## 2020-11-07 DIAGNOSIS — R07.9 CHEST PAIN: ICD-10-CM

## 2020-11-07 DIAGNOSIS — Z99.2 STAGE 5 CHRONIC KIDNEY DISEASE ON CHRONIC DIALYSIS: Primary | ICD-10-CM

## 2020-11-07 DIAGNOSIS — D64.9 ANEMIA, UNSPECIFIED TYPE: ICD-10-CM

## 2020-11-07 LAB
ABO + RH BLD: NORMAL
ALBUMIN SERPL BCP-MCNC: 2.6 G/DL (ref 3.5–5.2)
ALP SERPL-CCNC: 93 U/L (ref 55–135)
ALT SERPL W/O P-5'-P-CCNC: 10 U/L (ref 10–44)
ANION GAP SERPL CALC-SCNC: 15 MMOL/L (ref 8–16)
APTT PPP: 49.6 SEC (ref 23.6–33.3)
AST SERPL-CCNC: 14 U/L (ref 10–40)
BASOPHILS # BLD AUTO: 0.02 K/UL (ref 0–0.2)
BASOPHILS NFR BLD: 0.3 % (ref 0–1.9)
BILIRUB SERPL-MCNC: 0.9 MG/DL (ref 0.1–1)
BLD GP AB SCN CELLS X3 SERPL QL: NORMAL
BLD PROD TYP BPU: NORMAL
BLOOD UNIT EXPIRATION DATE: NORMAL
BLOOD UNIT TYPE CODE: 7300
BLOOD UNIT TYPE: NORMAL
BUN SERPL-MCNC: 38 MG/DL (ref 8–23)
CALCIUM SERPL-MCNC: 8.2 MG/DL (ref 8.7–10.5)
CHLORIDE SERPL-SCNC: 98 MMOL/L (ref 95–110)
CO2 SERPL-SCNC: 28 MMOL/L (ref 23–29)
CODING SYSTEM: NORMAL
CREAT SERPL-MCNC: 5.2 MG/DL (ref 0.5–1.4)
DIFFERENTIAL METHOD: ABNORMAL
DISPENSE STATUS: NORMAL
EOSINOPHIL # BLD AUTO: 0.1 K/UL (ref 0–0.5)
EOSINOPHIL NFR BLD: 1 % (ref 0–8)
ERYTHROCYTE [DISTWIDTH] IN BLOOD BY AUTOMATED COUNT: 17.9 % (ref 11.5–14.5)
EST. GFR  (AFRICAN AMERICAN): 8.7 ML/MIN/1.73 M^2
EST. GFR  (NON AFRICAN AMERICAN): 7.6 ML/MIN/1.73 M^2
GLUCOSE SERPL-MCNC: 103 MG/DL (ref 70–110)
HCT VFR BLD AUTO: 24.2 % (ref 37–48.5)
HCT VFR BLD AUTO: 26.9 % (ref 37–48.5)
HGB BLD-MCNC: 7.1 G/DL (ref 12–16)
HGB BLD-MCNC: 8.1 G/DL (ref 12–16)
IMM GRANULOCYTES # BLD AUTO: 0.02 K/UL (ref 0–0.04)
IMM GRANULOCYTES NFR BLD AUTO: 0.3 % (ref 0–0.5)
INR PPP: 2.2
LYMPHOCYTES # BLD AUTO: 0.8 K/UL (ref 1–4.8)
LYMPHOCYTES NFR BLD: 13.1 % (ref 18–48)
MCH RBC QN AUTO: 26.6 PG (ref 27–31)
MCHC RBC AUTO-ENTMCNC: 29.3 G/DL (ref 32–36)
MCV RBC AUTO: 91 FL (ref 82–98)
MONOCYTES # BLD AUTO: 0.5 K/UL (ref 0.3–1)
MONOCYTES NFR BLD: 8 % (ref 4–15)
NEUTROPHILS # BLD AUTO: 4.7 K/UL (ref 1.8–7.7)
NEUTROPHILS NFR BLD: 77.3 % (ref 38–73)
NRBC BLD-RTO: 0 /100 WBC
NUM UNITS TRANS PACKED RBC: NORMAL
PLATELET # BLD AUTO: 304 K/UL (ref 150–350)
PMV BLD AUTO: 10.8 FL (ref 9.2–12.9)
POTASSIUM SERPL-SCNC: 4.5 MMOL/L (ref 3.5–5.1)
PROT SERPL-MCNC: 7.1 G/DL (ref 6–8.4)
PROTHROMBIN TIME: 23.7 SEC (ref 10.6–14.8)
RBC # BLD AUTO: 2.67 M/UL (ref 4–5.4)
SARS-COV-2 RDRP RESP QL NAA+PROBE: NEGATIVE
SODIUM SERPL-SCNC: 141 MMOL/L (ref 136–145)
TROPONIN I SERPL DL<=0.01 NG/ML-MCNC: 0.03 NG/ML
WBC # BLD AUTO: 6.12 K/UL (ref 3.9–12.7)

## 2020-11-07 PROCEDURE — 84484 ASSAY OF TROPONIN QUANT: CPT

## 2020-11-07 PROCEDURE — 25000003 PHARM REV CODE 250: Performed by: FAMILY MEDICINE

## 2020-11-07 PROCEDURE — 12000002 HC ACUTE/MED SURGE SEMI-PRIVATE ROOM

## 2020-11-07 PROCEDURE — 93005 ELECTROCARDIOGRAM TRACING: CPT | Performed by: INTERNAL MEDICINE

## 2020-11-07 PROCEDURE — 99285 EMERGENCY DEPT VISIT HI MDM: CPT | Mod: 25

## 2020-11-07 PROCEDURE — C9113 INJ PANTOPRAZOLE SODIUM, VIA: HCPCS | Performed by: FAMILY MEDICINE

## 2020-11-07 PROCEDURE — 83036 HEMOGLOBIN GLYCOSYLATED A1C: CPT

## 2020-11-07 PROCEDURE — 30000890 LABCORP MISCELLANEOUS TEST

## 2020-11-07 PROCEDURE — 85014 HEMATOCRIT: CPT

## 2020-11-07 PROCEDURE — P9016 RBC LEUKOCYTES REDUCED: HCPCS

## 2020-11-07 PROCEDURE — 86920 COMPATIBILITY TEST SPIN: CPT

## 2020-11-07 PROCEDURE — 93010 EKG 12-LEAD: ICD-10-PCS | Mod: 76,,, | Performed by: INTERNAL MEDICINE

## 2020-11-07 PROCEDURE — 85025 COMPLETE CBC W/AUTO DIFF WBC: CPT

## 2020-11-07 PROCEDURE — 36430 TRANSFUSION BLD/BLD COMPNT: CPT

## 2020-11-07 PROCEDURE — 80053 COMPREHEN METABOLIC PANEL: CPT

## 2020-11-07 PROCEDURE — 85610 PROTHROMBIN TIME: CPT

## 2020-11-07 PROCEDURE — 85018 HEMOGLOBIN: CPT

## 2020-11-07 PROCEDURE — 93010 ELECTROCARDIOGRAM REPORT: CPT | Mod: ,,, | Performed by: INTERNAL MEDICINE

## 2020-11-07 PROCEDURE — 36415 COLL VENOUS BLD VENIPUNCTURE: CPT

## 2020-11-07 PROCEDURE — U0002 COVID-19 LAB TEST NON-CDC: HCPCS

## 2020-11-07 PROCEDURE — 63600175 PHARM REV CODE 636 W HCPCS: Performed by: FAMILY MEDICINE

## 2020-11-07 PROCEDURE — 86850 RBC ANTIBODY SCREEN: CPT

## 2020-11-07 PROCEDURE — 93010 ELECTROCARDIOGRAM REPORT: CPT | Mod: 76,,, | Performed by: INTERNAL MEDICINE

## 2020-11-07 PROCEDURE — 90935 HEMODIALYSIS ONE EVALUATION: CPT

## 2020-11-07 PROCEDURE — 85730 THROMBOPLASTIN TIME PARTIAL: CPT

## 2020-11-07 PROCEDURE — 99900035 HC TECH TIME PER 15 MIN (STAT)

## 2020-11-07 RX ORDER — METOPROLOL TARTRATE 1 MG/ML
5 INJECTION, SOLUTION INTRAVENOUS EVERY 5 MIN PRN
Status: DISCONTINUED | OUTPATIENT
Start: 2020-11-07 | End: 2020-11-10 | Stop reason: HOSPADM

## 2020-11-07 RX ORDER — TALC
6 POWDER (GRAM) TOPICAL NIGHTLY PRN
Status: DISCONTINUED | OUTPATIENT
Start: 2020-11-07 | End: 2020-11-10 | Stop reason: HOSPADM

## 2020-11-07 RX ORDER — VIT B COMP NO.3/FOLIC/C/BIOTIN 1 MG-60 MG
TABLET ORAL
COMMUNITY
End: 2020-11-07 | Stop reason: SDUPTHER

## 2020-11-07 RX ORDER — SODIUM CHLORIDE 9 MG/ML
INJECTION, SOLUTION INTRAVENOUS CONTINUOUS
Status: DISCONTINUED | OUTPATIENT
Start: 2020-11-07 | End: 2020-11-07

## 2020-11-07 RX ORDER — HYDROCODONE BITARTRATE AND ACETAMINOPHEN 7.5; 325 MG/1; MG/1
TABLET ORAL
COMMUNITY
End: 2020-11-07

## 2020-11-07 RX ORDER — METOPROLOL SUCCINATE 25 MG/1
25 TABLET, EXTENDED RELEASE ORAL NIGHTLY
Status: DISCONTINUED | OUTPATIENT
Start: 2020-11-07 | End: 2020-11-10 | Stop reason: HOSPADM

## 2020-11-07 RX ORDER — HYDROCODONE BITARTRATE AND ACETAMINOPHEN 500; 5 MG/1; MG/1
TABLET ORAL
Status: DISCONTINUED | OUTPATIENT
Start: 2020-11-07 | End: 2020-11-10 | Stop reason: HOSPADM

## 2020-11-07 RX ORDER — PROCHLORPERAZINE EDISYLATE 5 MG/ML
5 INJECTION INTRAMUSCULAR; INTRAVENOUS EVERY 6 HOURS PRN
Status: DISCONTINUED | OUTPATIENT
Start: 2020-11-07 | End: 2020-11-10 | Stop reason: HOSPADM

## 2020-11-07 RX ORDER — CALCIUM ACETATE 667 MG/1
667 CAPSULE ORAL DAILY
Status: DISCONTINUED | OUTPATIENT
Start: 2020-11-07 | End: 2020-11-10 | Stop reason: HOSPADM

## 2020-11-07 RX ORDER — ACETAMINOPHEN 325 MG/1
650 TABLET ORAL EVERY 8 HOURS PRN
Status: DISCONTINUED | OUTPATIENT
Start: 2020-11-07 | End: 2020-11-10 | Stop reason: HOSPADM

## 2020-11-07 RX ORDER — MORPHINE SULFATE 4 MG/ML
4 INJECTION, SOLUTION INTRAMUSCULAR; INTRAVENOUS EVERY 4 HOURS PRN
Status: DISCONTINUED | OUTPATIENT
Start: 2020-11-07 | End: 2020-11-10 | Stop reason: HOSPADM

## 2020-11-07 RX ORDER — PANTOPRAZOLE SODIUM 40 MG/10ML
40 INJECTION, POWDER, LYOPHILIZED, FOR SOLUTION INTRAVENOUS 2 TIMES DAILY
Status: DISCONTINUED | OUTPATIENT
Start: 2020-11-07 | End: 2020-11-10 | Stop reason: HOSPADM

## 2020-11-07 RX ORDER — NALOXONE HYDROCHLORIDE 4 MG/.1ML
SPRAY NASAL
COMMUNITY
End: 2020-11-07

## 2020-11-07 RX ORDER — HYDRALAZINE HYDROCHLORIDE 10 MG/1
10 TABLET, FILM COATED ORAL EVERY 8 HOURS PRN
Status: DISCONTINUED | OUTPATIENT
Start: 2020-11-07 | End: 2020-11-10 | Stop reason: HOSPADM

## 2020-11-07 RX ORDER — IBUPROFEN 200 MG
16 TABLET ORAL
Status: DISCONTINUED | OUTPATIENT
Start: 2020-11-07 | End: 2020-11-10 | Stop reason: HOSPADM

## 2020-11-07 RX ORDER — SODIUM CHLORIDE 0.9 % (FLUSH) 0.9 %
10 SYRINGE (ML) INJECTION
Status: DISCONTINUED | OUTPATIENT
Start: 2020-11-07 | End: 2020-11-10 | Stop reason: HOSPADM

## 2020-11-07 RX ORDER — TRAMADOL HYDROCHLORIDE 50 MG/1
TABLET ORAL
COMMUNITY
End: 2020-11-07

## 2020-11-07 RX ORDER — LANOLIN ALCOHOL/MO/W.PET/CERES
400 CREAM (GRAM) TOPICAL DAILY
Status: DISCONTINUED | OUTPATIENT
Start: 2020-11-07 | End: 2020-11-10 | Stop reason: HOSPADM

## 2020-11-07 RX ORDER — CALCIUM ACETATE 667 MG/1
667 CAPSULE ORAL DAILY
Status: DISCONTINUED | OUTPATIENT
Start: 2020-11-07 | End: 2020-11-07

## 2020-11-07 RX ORDER — GLUCAGON 1 MG
1 KIT INJECTION
Status: DISCONTINUED | OUTPATIENT
Start: 2020-11-07 | End: 2020-11-10 | Stop reason: HOSPADM

## 2020-11-07 RX ORDER — POLYETHYLENE GLYCOL 3350 17 G/17G
17 POWDER, FOR SOLUTION ORAL 2 TIMES DAILY PRN
Status: DISCONTINUED | OUTPATIENT
Start: 2020-11-07 | End: 2020-11-10 | Stop reason: HOSPADM

## 2020-11-07 RX ORDER — IBUPROFEN 200 MG
24 TABLET ORAL
Status: DISCONTINUED | OUTPATIENT
Start: 2020-11-07 | End: 2020-11-10 | Stop reason: HOSPADM

## 2020-11-07 RX ORDER — ONDANSETRON 2 MG/ML
4 INJECTION INTRAMUSCULAR; INTRAVENOUS EVERY 8 HOURS PRN
Status: DISCONTINUED | OUTPATIENT
Start: 2020-11-07 | End: 2020-11-10 | Stop reason: HOSPADM

## 2020-11-07 RX ORDER — ENOXAPARIN SODIUM 100 MG/ML
60 INJECTION SUBCUTANEOUS DAILY
Status: ON HOLD | COMMUNITY
End: 2020-11-10 | Stop reason: HOSPADM

## 2020-11-07 RX ORDER — IPRATROPIUM BROMIDE AND ALBUTEROL SULFATE 2.5; .5 MG/3ML; MG/3ML
3 SOLUTION RESPIRATORY (INHALATION) EVERY 4 HOURS PRN
Status: DISCONTINUED | OUTPATIENT
Start: 2020-11-07 | End: 2020-11-10 | Stop reason: HOSPADM

## 2020-11-07 RX ORDER — SODIUM THIOSULFATE 250 MG/ML
25 INJECTION, SOLUTION INTRAVENOUS
Status: DISCONTINUED | OUTPATIENT
Start: 2020-11-09 | End: 2020-11-10 | Stop reason: HOSPADM

## 2020-11-07 RX ORDER — CHLORHEXIDINE GLUCONATE ORAL RINSE 1.2 MG/ML
15 SOLUTION DENTAL 2 TIMES DAILY
Status: DISPENSED | OUTPATIENT
Start: 2020-11-07 | End: 2020-11-10

## 2020-11-07 RX ORDER — DILTIAZEM HYDROCHLORIDE 180 MG/1
180 CAPSULE, COATED, EXTENDED RELEASE ORAL DAILY
Status: DISCONTINUED | OUTPATIENT
Start: 2020-11-08 | End: 2020-11-09

## 2020-11-07 RX ORDER — CLINDAMYCIN HYDROCHLORIDE 300 MG/1
300 CAPSULE ORAL 3 TIMES DAILY
Status: ON HOLD | COMMUNITY
End: 2020-11-18 | Stop reason: HOSPADM

## 2020-11-07 RX ORDER — METOPROLOL TARTRATE 25 MG/1
TABLET, FILM COATED ORAL
COMMUNITY
End: 2020-11-07 | Stop reason: SDUPTHER

## 2020-11-07 RX ADMIN — PANTOPRAZOLE SODIUM 40 MG: 40 INJECTION, POWDER, FOR SOLUTION INTRAVENOUS at 03:11

## 2020-11-07 RX ADMIN — METOPROLOL SUCCINATE 25 MG: 25 TABLET, FILM COATED, EXTENDED RELEASE ORAL at 08:11

## 2020-11-07 RX ADMIN — PIPERACILLIN SODIUM AND TAZOBACTAM SODIUM 3.38 G: 3; .375 INJECTION, POWDER, LYOPHILIZED, FOR SOLUTION INTRAVENOUS at 08:11

## 2020-11-07 RX ADMIN — MAGNESIUM OXIDE 400 MG: 400 TABLET ORAL at 03:11

## 2020-11-07 NOTE — ED PROVIDER NOTES
Encounter Date: 11/7/2020       History     Chief Complaint   Patient presents with    Rectal Bleeding     bloody stools x1 day     75-year-old female brought to emergency room by her son with a history of multiple medical problems including atrial fibrillation on Coumadin, CHF, chronic kidney disease on hemodialysis which was last on yesterday, peripheral vascular disease and who had a recent hospitalization for sepsis thought that been secondary to pneumonia as well as UTI, now presents with complaints of rectal bleeding.  Patient states prior to discharge yesterday she had rectal bleeding but failed to mention it to her physician in only mention did nursing.  She has also noted some epistaxis.  No history of any hemoptysis.  No bruising easily.  The patient does not make much of any urine.  No fever or chills.  No shortness of breath.  She does complain of feeling fatigued.  She admits that couple months ago she had had an EGD and colonoscopy which required some cauterization but is unclear as to what was cauterized.  In reviewing her most recent hospitalization the patient a decline much workup and left the hospital probably prematurely.  Currently no complaint of any significant shortness of breath or chest pain.  No nausea        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    both legs    CHF (congestive heart failure)     Encounter for blood transfusion 03/2016    Gout     Hemodialysis access, AV graft     Hypertension     Mitral valve regurgitation     Osteoarthritis     Pancreatitis     Peripheral vascular disease     Pneumonia 09/09/2017    Renal failure      Past Surgical History:   Procedure Laterality Date    ACHILLES TENDON SURGERY Right     ANGIOGRAPHY OF ARTERIOVENOUS SHUNT Right 1/7/2020    Procedure: Fistulogram with Possible Intervention;  Surgeon: Joseph Loyola,  MD;  Location: [facility] CATH/EP LAB;  Service: Cardiology;  Laterality: Right;    ANGIOGRAPHY OF LOWER EXTREMITY Left 3/18/2020    Procedure: Angiogram Extremity Unilateral;  Surgeon: Ali Khoobehi, MD;  Location: [facility] CATH/EP LAB;  Service: Cardiology;  Laterality: Left;    APPENDECTOMY      CARDIAC CATHETERIZATION  07/03/2017    CHOLECYSTECTOMY      COLONOSCOPY Left 10/4/2020    Procedure: COLONOSCOPY;  Surgeon: Jordan Kilpatrick MD;  Location: [facility] ENDO;  Service: Endoscopy;  Laterality: Left;    ESOPHAGOGASTRODUODENOSCOPY Left 8/17/2020    Procedure: EGD (ESOPHAGOGASTRODUODENOSCOPY);  Surgeon: Talat Trevizo MD;  Location: [facility] ENDO;  Service: Endoscopy;  Laterality: Left;    ESOPHAGOGASTRODUODENOSCOPY Left 10/2/2020    Procedure: EGD (ESOPHAGOGASTRODUODENOSCOPY);  Surgeon: Talat Trevizo MD;  Location: [facility] ENDO;  Service: Endoscopy;  Laterality: Left;    heal surgery Right     HYSTERECTOMY      INSERTION OF STENT INTO PERIPHERAL VESSEL N/A 1/7/2020    Procedure: INSERTION, STENT, VESSEL, PERIPHERAL;  Surgeon: Joseph Loyola MD;  Location: [facility] CATH/EP LAB;  Service: Cardiology;  Laterality: N/A;    MITRAL VALVE REPLACEMENT      PARATHYROIDECTOMY      PARATHYROIDECTOMY  07/13/2017    PERCUTANEOUS TRANSLUMINAL ANGIOPLASTY OF ARTERIOVENOUS FISTULA N/A 1/7/2020    Procedure: PTA, AV FISTULA;  Surgeon: Joseph Loyola MD;  Location: [facility] CATH/EP LAB;  Service: Cardiology;  Laterality: N/A;    PERITONEAL CATHETER INSERTION      RENAL BIOPSY      vocal cord nodule      WOUND DEBRIDEMENT Left 7/13/2020    Procedure: DEBRIDEMENT, WOUND;  Surgeon: Carlos Elam III, MD;  Location: [facility] OR;  Service: General;  Laterality: Left;     Family History   Problem Relation Age of Onset    Heart disease Sister     Early death Sister         heart as baby    Heart disease Maternal Grandfather     Diabetes Mother     Hypertension Mother     Heart failure Mother     Heart disease Mother      Arthritis Mother     Diabetes Father     Early death Sister         infant     Social History     Tobacco Use    Smoking status: Current Some Day Smoker     Packs/day: 0.50     Years: 45.00     Pack years: 22.50     Types: Cigarettes    Smokeless tobacco: Never Used   Substance Use Topics    Alcohol use: No    Drug use: No     Review of Systems   Constitutional: Negative for activity change, appetite change, chills, diaphoresis and fever.   HENT: Positive for nosebleeds. Negative for sinus pressure, sneezing, sore throat and trouble swallowing.    Respiratory: Negative for cough, choking, chest tightness and shortness of breath.    Cardiovascular: Negative for chest pain, palpitations and leg swelling.   Gastrointestinal: Positive for anal bleeding and blood in stool. Negative for abdominal pain, diarrhea, nausea and vomiting.   Genitourinary: Positive for decreased urine volume. Negative for dysuria and vaginal bleeding.   Musculoskeletal: Negative for back pain.   Skin: Negative.  Negative for rash.   Neurological: Negative for dizziness, weakness, light-headedness and headaches.   Hematological: Does not bruise/bleed easily.       Physical Exam     Initial Vitals [11/07/20 1151]   BP Pulse Resp Temp SpO2   (!) 164/77 80 18 98.4 °F (36.9 °C) 98 %      MAP       --         Physical Exam    Vitals reviewed.  Constitutional: She is not diaphoretic. No distress.   Chronically ill-appearing, sleepy but arousable   HENT:   Head: Normocephalic and atraumatic.   Nose: Nose normal.   Mouth/Throat: Oropharynx is clear and moist. No oropharyngeal exudate.   Eyes: Conjunctivae are normal. Pupils are equal, round, and reactive to light.   Neck: Normal range of motion. Neck supple. No JVD present.   Cardiovascular: Normal rate, regular rhythm, normal heart sounds and intact distal pulses. Exam reveals no gallop and no friction rub.    No murmur heard.  Pulmonary/Chest: Breath sounds normal. No respiratory distress. She  has no wheezes. She has no rhonchi. She has no rales. She exhibits no tenderness.   Abdominal: Soft. Bowel sounds are normal. She exhibits no distension. There is no abdominal tenderness. There is no rebound and no guarding.   Rectal exam reveals a relatively empty vault but dark blood on the examining digit is noted.   Musculoskeletal: Normal range of motion. Edema present. No tenderness.      Comments: Both lower extremities are edematous with some chronic edema.  Peripheral pulses not palpable.   Lymphadenopathy:     She has no cervical adenopathy.   Neurological: She is alert and oriented to person, place, and time. She has normal strength. GCS score is 15. GCS eye subscore is 4. GCS verbal subscore is 5. GCS motor subscore is 6.   Skin: Skin is warm and dry. Capillary refill takes less than 2 seconds. No rash noted. No erythema. No pallor.   Psychiatric: She has a normal mood and affect. Her behavior is normal. Judgment and thought content normal.         ED Course   Procedures  Labs Reviewed   CBC W/ AUTO DIFFERENTIAL - Abnormal; Notable for the following components:       Result Value    RBC 2.67 (*)     Hemoglobin 7.1 (*)     Hematocrit 24.2 (*)     MCH 26.6 (*)     MCHC 29.3 (*)     RDW 17.9 (*)     Lymph # 0.8 (*)     Gran % 77.3 (*)     Lymph % 13.1 (*)     All other components within normal limits   COMPREHENSIVE METABOLIC PANEL - Abnormal; Notable for the following components:    BUN 38 (*)     Creatinine 5.2 (*)     Calcium 8.2 (*)     Albumin 2.6 (*)     eGFR if  8.7 (*)     eGFR if non  7.6 (*)     All other components within normal limits   APTT - Abnormal; Notable for the following components:    aPTT 49.6 (*)     All other components within normal limits   PROTIME-INR - Abnormal; Notable for the following components:    PT 23.7 (*)     All other components within normal limits   SARS-COV-2 RNA AMPLIFICATION, QUAL   TYPE & SCREEN   PREPARE RBC SOFT        ECG Results           EKG 12-lead (In process)  Result time 11/07/20 13:43:02    In process by Interface, Lab In [facility] (11/07/20 13:43:02)                 Narrative:    Test Reason : I48.91,    Vent. Rate : 119 BPM     Atrial Rate : 153 BPM     P-R Int : 000 ms          QRS Dur : 088 ms      QT Int : 350 ms       P-R-T Axes : 000 -30 093 degrees     QTc Int : 492 ms    Atrial fibrillation with rapid ventricular response  Left axis deviation  Nonspecific T wave abnormality  Abnormal ECG  When compared with ECG of 04-NOV-2020 02:40,  Atrial fibrillation has replaced Sinus rhythm  T wave inversion now evident in Anterior leads    Referred By: [email or web address]   SELF           Confirmed By:                             Imaging Results          X-Ray Chest AP Portable (Final result)  Result time 11/07/20 12:59:05    Final result by Harish Liu MD (11/07/20 12:59:05)                 Impression:      Development of small right pleural effusion.    Cardiomegaly with evidence of pulmonary vascular congestion.      Electronically signed by: Harish Liu MD  Date:    11/07/2020  Time:    12:59             Narrative:    EXAMINATION:  XR CHEST AP PORTABLE    CLINICAL HISTORY:  Rectal bleeding;    COMPARISON:  11/04/2020    FINDINGS:  Cardiac silhouette size is enlarged and stable from prior.  Mitral valve replacement.  Atherosclerotic calcification of the aorta.    Small right pleural effusion.  Trace fluid within the right minor fissure.  Prominent central pulmonary vascularity without missy pulmonary edema.  No acute osseous abnormality.                                              Attending Attestation:             Attending ED Notes:   This 75-year-old female was discharged from the hospital yesterday who return to complaints of rectal bleeding who admits that she had rectal bleeding yesterday before discharge, has a benign abdomen on exam.  The rectal exam reveals grossly dark blood.  The patient's H&H is little bit lower with  hematocrit 24 at this time as opposed to it being 27 a day or so ago.  Her INR is 2.2.  The patient additionally states she had had some epistaxis but not at this time.  The hospital medicine service was contacted Dr. Jimbo MALDONADO who discharged the patient yesterday will be readmitting at this time.                    Clinical Impression:     ICD-10-CM ICD-9-CM   1. Stage 5 chronic kidney disease on chronic dialysis  N18.6 585.6    Z99.2 V45.11   2. Atrial fibrillation  I48.91 427.31   3. Rectal bleeding  K62.5 569.3   4. Anemia, unspecified type  D64.9 285.9                          ED Disposition Condition    Admit                             Kenji Sanford Jr., MD  11/07/20 1443       Kenji Sanford Jr., MD  11/07/20 5701

## 2020-11-07 NOTE — ED NOTES
Pt resting in bed. rr even and unlabored on home o2. Nadn. Updated pt and son of plan of care. Both verbalized understanding

## 2020-11-07 NOTE — ED NOTES
Pt sleeping. rr even and unlabored. Nadn. Updated on plan of care. Pt son verbalized understanding

## 2020-11-07 NOTE — ED NOTES
Pt presents to the ED with c/o rectal bleeding. Pt states she was discarged from the hospital yesterday and had blood in her stools yesterday. Pt taking blood thinners. Pt with c/o generalized weakness. Pt is dialysis pt with EDINSON fistula

## 2020-11-07 NOTE — ED NOTES
Pt resting in bed. rr even and unlabored. Nadn. Pt updated on plan of care. Pt verbalized understanding. Informed pt on NPO status. Pt verbalized understanding.

## 2020-11-08 ENCOUNTER — CLINICAL SUPPORT (OUTPATIENT)
Dept: CARDIOLOGY | Facility: HOSPITAL | Age: 75
DRG: 377 | End: 2020-11-08
Attending: FAMILY MEDICINE
Payer: MEDICARE

## 2020-11-08 LAB
AORTIC ROOT ANNULUS: 3.02 CM
AORTIC VALVE CUSP SEPERATION: 1.33 CM
AV INDEX (PROSTH): 1.29
AV MEAN GRADIENT: 4 MMHG
AV PEAK GRADIENT: 8 MMHG
AV VALVE AREA: 3.96 CM2
AV VELOCITY RATIO: 76.33
BSA FOR ECHO PROCEDURE: 1.67 M2
CV ECHO LV RWT: 0.99 CM
DOP CALC AO PEAK VEL: 1.4 M/S
DOP CALC AO VTI: 16.28 CM
DOP CALC LVOT AREA: 3.1 CM2
DOP CALC LVOT DIAMETER: 1.98 CM
DOP CALC LVOT PEAK VEL: 106.86 M/S
DOP CALC LVOT STROKE VOLUME: 64.5 CM3
DOP CALCLVOT PEAK VEL VTI: 20.96 CM
E WAVE DECELERATION TIME: 395.16 MSEC
E/E' RATIO: 50 M/S
ECHO LV POSTERIOR WALL: 1.82 CM (ref 0.6–1.1)
FRACTIONAL SHORTENING: 26 % (ref 28–44)
INTERVENTRICULAR SEPTUM: 1.82 CM (ref 0.6–1.1)
LEFT ATRIUM SIZE: 5.73 CM
LEFT INTERNAL DIMENSION IN SYSTOLE: 2.71 CM (ref 2.1–4)
LEFT VENTRICLE MASS INDEX: 171 G/M2
LEFT VENTRICULAR INTERNAL DIMENSION IN DIASTOLE: 3.68 CM (ref 3.5–6)
LEFT VENTRICULAR MASS: 285.47 G
LV LATERAL E/E' RATIO: 50 M/S
LV SEPTAL E/E' RATIO: 50 M/S
MV PEAK E VEL: 2 M/S
PISA TR MAX VEL: 2.84 M/S
PV PEAK VELOCITY: 83.11 CM/S
RA PRESSURE: 8 MMHG
RIGHT VENTRICULAR END-DIASTOLIC DIMENSION: 330 CM
TDI LATERAL: 0.04 M/S
TDI SEPTAL: 0.04 M/S
TDI: 0.04 M/S
TR MAX PG: 32 MMHG
TV REST PULMONARY ARTERY PRESSURE: 40 MMHG

## 2020-11-08 PROCEDURE — 93306 TTE W/DOPPLER COMPLETE: CPT | Mod: 26,,, | Performed by: INTERNAL MEDICINE

## 2020-11-08 PROCEDURE — 93306 TTE W/DOPPLER COMPLETE: CPT

## 2020-11-08 PROCEDURE — 94761 N-INVAS EAR/PLS OXIMETRY MLT: CPT

## 2020-11-08 PROCEDURE — 99223 1ST HOSP IP/OBS HIGH 75: CPT | Mod: 25,,, | Performed by: INTERNAL MEDICINE

## 2020-11-08 PROCEDURE — 12000002 HC ACUTE/MED SURGE SEMI-PRIVATE ROOM

## 2020-11-08 PROCEDURE — 63600175 PHARM REV CODE 636 W HCPCS: Performed by: FAMILY MEDICINE

## 2020-11-08 PROCEDURE — 93306 ECHO (CUPID ONLY): ICD-10-PCS | Mod: 26,,, | Performed by: INTERNAL MEDICINE

## 2020-11-08 PROCEDURE — 99900031 HC PATIENT EDUCATION (STAT)

## 2020-11-08 PROCEDURE — 27000221 HC OXYGEN, UP TO 24 HOURS

## 2020-11-08 PROCEDURE — 99900035 HC TECH TIME PER 15 MIN (STAT)

## 2020-11-08 PROCEDURE — C9113 INJ PANTOPRAZOLE SODIUM, VIA: HCPCS | Performed by: FAMILY MEDICINE

## 2020-11-08 PROCEDURE — 25000003 PHARM REV CODE 250: Performed by: FAMILY MEDICINE

## 2020-11-08 PROCEDURE — 99223 PR INITIAL HOSPITAL CARE,LEVL III: ICD-10-PCS | Mod: 25,,, | Performed by: INTERNAL MEDICINE

## 2020-11-08 RX ORDER — ISOSORBIDE MONONITRATE 30 MG/1
30 TABLET, EXTENDED RELEASE ORAL DAILY
Status: DISCONTINUED | OUTPATIENT
Start: 2020-11-08 | End: 2020-11-10 | Stop reason: HOSPADM

## 2020-11-08 RX ADMIN — PANTOPRAZOLE SODIUM 40 MG: 40 INJECTION, POWDER, FOR SOLUTION INTRAVENOUS at 08:11

## 2020-11-08 RX ADMIN — MAGNESIUM OXIDE 400 MG: 400 TABLET ORAL at 09:11

## 2020-11-08 RX ADMIN — CALCIUM ACETATE 667 MG: 667 CAPSULE ORAL at 09:11

## 2020-11-08 RX ADMIN — PANTOPRAZOLE SODIUM 40 MG: 40 INJECTION, POWDER, FOR SOLUTION INTRAVENOUS at 09:11

## 2020-11-08 RX ADMIN — DILTIAZEM HYDROCHLORIDE 180 MG: 180 CAPSULE, COATED, EXTENDED RELEASE ORAL at 09:11

## 2020-11-08 RX ADMIN — ONDANSETRON 4 MG: 2 INJECTION, SOLUTION INTRAMUSCULAR; INTRAVENOUS at 09:11

## 2020-11-08 RX ADMIN — METOPROLOL SUCCINATE 25 MG: 25 TABLET, FILM COATED, EXTENDED RELEASE ORAL at 08:11

## 2020-11-08 RX ADMIN — ISOSORBIDE MONONITRATE 30 MG: 30 TABLET, EXTENDED RELEASE ORAL at 05:11

## 2020-11-08 RX ADMIN — PIPERACILLIN SODIUM AND TAZOBACTAM SODIUM 3.38 G: 3; .375 INJECTION, POWDER, LYOPHILIZED, FOR SOLUTION INTRAVENOUS at 08:11

## 2020-11-08 RX ADMIN — PIPERACILLIN SODIUM AND TAZOBACTAM SODIUM 3.38 G: 3; .375 INJECTION, POWDER, LYOPHILIZED, FOR SOLUTION INTRAVENOUS at 09:11

## 2020-11-08 NOTE — CARE UPDATE
11/08/20 1009   Patient Assessment/Suction   Level of Consciousness (AVPU) alert   Respiratory Effort Normal;Unlabored   Expansion/Accessory Muscles/Retractions no use of accessory muscles   All Lung Fields Breath Sounds Anterior:;diminished;clear   Rhythm/Pattern, Respiratory unlabored;pattern regular;depth regular   PRE-TX-O2   O2 Device (Oxygen Therapy) Oxymask   $ Is the patient on Low Flow Oxygen? Yes   Flow (L/min) 3  (WEANED FROM 10)   SpO2   (PT COLD-DIFICULTY GETTING READING)   Pulse Oximetry Type Intermittent   $ Pulse Oximetry - Multiple Charge Pulse Oximetry - Multiple   Positioning Sitting in chair   Aerosol Therapy   $ Aerosol Therapy Charges PRN treatment not required   Education   $ Education Bronchodilator;15 min  (OXYGEN)   Respiratory Evaluation   $ Care Plan Tech Time 15 min

## 2020-11-08 NOTE — PROGRESS NOTES
RN notified me that patient felt SOB and had chest pressure.  STAT EKG done:  Afib, no ST elevation.  Rate controlled HR in the 80s.  100% on oxy mask 5L as she is a mouth breather.  Is normally on 4L NC at home.  Patient with cough with clear sputum. Her son was initially not in the room but was present upon my arrival. STAT Troponin done and 0.031, which is less than the previous of 0.051.  Repeat H/H done which is 8.1/26.9 which is up from 7.1/24.2.  RN reports that she went to HD but they were not able to get as much fluid off as they wished. Repeat CXR done stat and reveals persistent right effusion that does not look significantly changed to me. Patient also reports headache and a scab in her left nostril that she requested I look at. She does have a scan in her left nostril with some mild oozing but not significant.  RN started humidified oxygen.  I discussed not to pick at scan with finger or tissue and this will need to heal itself overtime.  I suggested O2 could be drying out her nasal passages but she told me she doubted it as she has been on oxygen for quite a while and this has never happened before. Will continue to monitor vitals and any change in clinical course.

## 2020-11-08 NOTE — HPI
74 yo AAF with PMH as noted below presenting with rectal bleeding.  She was discharged yesterday after recovering from sepsis due to UTI  Bleeding started on day of discharge  However she didn't report this finding to me  She wanted to go home  Bleeding has continued since discharge  No SOB, no CP, no dizziness, no palpitations  +worsening fatigue  She takes coumadin for a-fib  Pt previously had GI bleed cauterized per her report a few months ago  No fever, no chills

## 2020-11-08 NOTE — PROGRESS NOTES
"Physical Therapy      Patient Name:  Griselda Neely   MRN:  [id number]    Patient not seen today secondary to (P) Patient unwilling to participate(Pt states, "I'm not walking. I haven't had anything to eat for 3 days and I'm tired."). Will follow-up 11/9/20.    Lizett Dunbar, PT    "

## 2020-11-08 NOTE — PROGRESS NOTES
"[facility] Medicine  Progress Note    Patient Name: Griselda Neely  MRN: [id number]  Patient Class: IP- Inpatient   Admission Date: 11/7/2020 11:50 AM  Attending Physician: Darlene Lin MD  Primary Care Provider: Kalie Neely MD  Face-to-Face encounter date: 11/08/2020    Assessment & Plan:   Griselda Neely is a 75 y.o. female with:    Acute Blood Loss Anemia  Due to GI bleed  Complicated by anemia of chronic disease  Chronically anticoagulated due to a-fib with atrial thrombus  - trending H&H; however pt refused lab draws today  - pRBC transfusion yesterday  - PPI   - holding home coumadin  - daily INR  - echo  - GI and cardiology consulted, thank you     ESRD  - HD today  - nephrology consulted, thank you     Toe pain / Injury  - podiatry consulted, thank you     Recently discharged for sepsis  Due to UTI vs PNA  - continue zosyn     Other chronic conditions: home meds as appropriate  Electrolyte derangement:  Replacement prn  VTE ppx: SCDs, actively bleeding  DNR    Discharge Planning:     Home at discharge with  PT/OT.    Subjective:      11/8:  Pt upset because she has been NPO for a possible GI procedure.  She wants to leave AMA.  As it is now afternoon, I am allowing her to eat some food to prevent her from leaving.  NPO midnight.  No CP, no palpitations, no dizziness.  Chronic SOB on supplemental oxygen.    Review of Systems   All systems reviewed and are negative except as noted per above.  Objective:     Physical Exam  BP (!) 172/83   Pulse 80   Temp 97.3 °F (36.3 °C)   Resp 19   Ht 5' 5" (1.651 m)   Wt 60.8 kg (134 lb)   LMP  (LMP Unknown)   SpO2 99%   BMI 22.30 kg/m²     Gen: alert, responsive, FRAIL   HEENT:  Eyes - no pallor  External ears with no lesions  Nares patent  Mouth - lips chapped  CV: iRRR  Lungs: COARSE, ON SUPPLEMENTAL OXYGEN  Abd: +BS, soft, NT, ND  Ext: +CHRONIC atrophy; NO edema  Skin: warm, dry; L TOE WITH TISSUE PINCHED IN NAIL " BED  Neuro: grossly intact  Psych: pleasant    Recent Labs   Lab 11/07/20 2030   HGB 8.1*   HCT 26.9*     No results for input(s): GLUCOSE, CALCIUM, ALBUMIN, PROT, NA, K, CO2, CL, BUN, CREATININE, ALKPHOS, ALT, AST, BILITOT in the last 24 hours.  No results found for: POCTGLUCOSE   Microbiology Results (last 7 days)     ** No results found for the last 168 hours. **        X-Ray Chest AP Portable   Final Result      X-Ray Chest AP Portable   Final Result      Development of small right pleural effusion.      Cardiomegaly with evidence of pulmonary vascular congestion.         Electronically signed by: Harish Liu MD   Date:    11/07/2020   Time:    12:59          All labs, images, and other studies - including those not included in this note -- were reviewed personally by me.    Inpatient medications  Scheduled Meds:   calcium acetate(phosphat bind)  667 mg Oral Daily    chlorhexidine  15 mL Mouth/Throat BID    diltiaZEM  180 mg Oral Daily    [START ON 11/9/2020] epoetin alfonzo-epbx  100 Units/kg Subcutaneous Every Mon, Wed, Fri    magnesium oxide  400 mg Oral Daily    metoprolol succinate  25 mg Oral Nightly    pantoprazole  40 mg Intravenous BID    piperacillin-tazobactam (ZOSYN) IVPB  3.375 g Intravenous Q12H    [START ON 11/9/2020] sodium thiosulfate  25 g Intravenous Every Mon, Wed, Fri     Continuous Infusions:  PRN Meds:.sodium chloride, acetaminophen, albuterol-ipratropium, dextrose 50%, dextrose 50%, glucagon (human recombinant), glucose, glucose, hydrALAZINE, melatonin, metoprolol, morphine, ondansetron, polyethylene glycol, prochlorperazine, sodium chloride 0.9%    Above encounter included review of the medical records, interviewing and examining the patient face-to-face, discussion with family and other health care providers, ordering and interpreting lab/test results and formulating a plan of care.     Darlene Lin MD  [facility]ist

## 2020-11-08 NOTE — H&P
[facility] Medicine  History & Physical    Patient Name: Griselda Neely  MRN: [id number]  Admission Date: 11/7/2020  Attending Physician: Darlene Lin MD   Primary Care Provider: Kalie Neely MD    Patient information was obtained from patient, relative(s), past medical records, ER physician and ER records.     Subjective:     Principal Problem:Acute blood loss anemia    Chief Complaint:   Chief Complaint   Patient presents with    Rectal Bleeding     bloody stools x1 day        HPI: 74 yo AAF with PMH as noted below presenting with rectal bleeding.  She was discharged yesterday after recovering from sepsis due to UTI  Bleeding started on day of discharge  However she didn't report this finding to me  She wanted to go home  Bleeding has continued since discharge  No SOB, no CP, no dizziness, no palpitations  +worsening fatigue  She takes coumadin for a-fib  Pt previously had GI bleed cauterized per her report a few months ago  No fever, no chills    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    both legs    CHF (congestive heart failure)     Encounter for blood transfusion 03/2016    Gout     Hemodialysis access, AV graft     Hypertension     Mitral valve regurgitation     Osteoarthritis     Pancreatitis     Peripheral vascular disease     Pneumonia 09/09/2017    Renal failure        Past Surgical History:   Procedure Laterality Date    ACHILLES TENDON SURGERY Right     ANGIOGRAPHY OF ARTERIOVENOUS SHUNT Right 1/7/2020    Procedure: Fistulogram with Possible Intervention;  Surgeon: Joseph Loyola MD;  Location: [facility] CATH/EP LAB;  Service: Cardiology;  Laterality: Right;    ANGIOGRAPHY OF LOWER EXTREMITY Left 3/18/2020    Procedure: Angiogram Extremity Unilateral;  Surgeon: Ali Khoobehi, MD;  Location: [facility] CATH/EP LAB;  Service: Cardiology;  Laterality: Left;    APPENDECTOMY      CARDIAC CATHETERIZATION   07/03/2017    CHOLECYSTECTOMY      COLONOSCOPY Left 10/4/2020    Procedure: COLONOSCOPY;  Surgeon: Jordan Kilpatrick MD;  Location: [facility] ENDO;  Service: Endoscopy;  Laterality: Left;    ESOPHAGOGASTRODUODENOSCOPY Left 8/17/2020    Procedure: EGD (ESOPHAGOGASTRODUODENOSCOPY);  Surgeon: Talat Trevizo MD;  Location: [facility] ENDO;  Service: Endoscopy;  Laterality: Left;    ESOPHAGOGASTRODUODENOSCOPY Left 10/2/2020    Procedure: EGD (ESOPHAGOGASTRODUODENOSCOPY);  Surgeon: Talat Trevizo MD;  Location: [facility] ENDO;  Service: Endoscopy;  Laterality: Left;    heal surgery Right     HYSTERECTOMY      INSERTION OF STENT INTO PERIPHERAL VESSEL N/A 1/7/2020    Procedure: INSERTION, STENT, VESSEL, PERIPHERAL;  Surgeon: Joseph Loyola MD;  Location: [facility] CATH/EP LAB;  Service: Cardiology;  Laterality: N/A;    MITRAL VALVE REPLACEMENT      PARATHYROIDECTOMY      PARATHYROIDECTOMY  07/13/2017    PERCUTANEOUS TRANSLUMINAL ANGIOPLASTY OF ARTERIOVENOUS FISTULA N/A 1/7/2020    Procedure: PTA, AV FISTULA;  Surgeon: Joseph Loyola MD;  Location: [facility] CATH/EP LAB;  Service: Cardiology;  Laterality: N/A;    PERITONEAL CATHETER INSERTION      RENAL BIOPSY      vocal cord nodule      WOUND DEBRIDEMENT Left 7/13/2020    Procedure: DEBRIDEMENT, WOUND;  Surgeon: Carlos Elam III, MD;  Location: [facility] OR;  Service: General;  Laterality: Left;       Review of patient's allergies indicates:  No Known Allergies    No current facility-administered medications on file prior to encounter.      Current Outpatient Medications on File Prior to Encounter   Medication Sig    amoxicillin-clavulanate 500-125mg (AUGMENTIN) 500-125 mg Tab Take 1 tablet (500 mg total) by mouth every evening. Take AFTER DIALYSIS on dialysis days. for 7 days    aspirin (ECOTRIN) 81 MG EC tablet Take 1 tablet (81 mg total) by mouth once daily.    calcium acetate (PHOSLO) 667 mg capsule Take 667 mg by mouth once daily.     diltiaZEM (CARDIZEM CD) 180 MG  24 hr capsule Take 1 capsule by mouth once daily (Patient taking differently: Take 180 mg by mouth once daily. )    folic acid/vit B complex and C (JENNIFER-KODAK ORAL) Take 1 tablet by mouth once daily.    isosorbide mononitrate (IMDUR) 30 MG 24 hr tablet Take 30 mg by mouth once daily.    losartan (COZAAR) 25 MG tablet Take 25 mg by mouth once daily.    magnesium oxide (MAG-OX) 400 mg (241.3 mg magnesium) tablet Take 1 tablet by mouth once daily (Patient taking differently: Take 400 mg by mouth once daily. )    metoprolol succinate (TOPROL-XL) 25 MG 24 hr tablet Take 1 tablet (25 mg total) by mouth nightly.    ondansetron (ZOFRAN-ODT) 4 MG TbDL Take 4 mg by mouth every 6 (six) hours as needed.    oxyCODONE-acetaminophen (PERCOCET) 5-325 mg per tablet Take 1 tablet by mouth every 6 (six) hours as needed for Pain.    pantoprazole (PROTONIX) 40 MG tablet Take 1 tablet (40 mg total) by mouth once daily.    warfarin (COUMADIN) 2.5 MG tablet Take 2.5 mg by mouth once daily.    zinc sulfate 220 mg Tab tablet Take 220 mg by mouth every other day.    clindamycin (CLEOCIN) 300 MG capsule Take 300 mg by mouth 3 (three) times daily.     enoxaparin (LOVENOX) 60 mg/0.6 mL Syrg Inject 60 mg into the skin once daily.     wheelchair An 1 Device by Misc.(Non-Drug; Combo Route) route as needed (needed for transport).    [DISCONTINUED] HYDROcodone-acetaminophen (NORCO) 7.5-325 mg per tablet hydrocodone 7.5 mg-acetaminophen 325 mg tablet   TAKE ONE TABLET BY MOUTH PRIOR TO WOUND DEBRIDEMENT. REPEAT DOSE IN 8 HOURS POST DEBRIDEMENT.    [DISCONTINUED] metoprolol tartrate (LOPRESSOR) 25 MG tablet metoprolol tartrate 25 mg tablet   TAKE 1 TABLET BY MOUTH EVERY 12 HOURS    [DISCONTINUED] naloxone (NARCAN) 4 mg/actuation Spry Narcan 4 mg/actuation nasal spray    [DISCONTINUED] traMADoL (ULTRAM) 50 mg tablet tramadol 50 mg tablet    [DISCONTINUED] vit b cmplx 3-fa-vit c-biotin 1- mg-mg-mcg (JENNIFER-KODAK RX) 1-  mg-mg-mcg Tab Rebeca-Andrew Rx 1 mg-60 mg-300 mcg tablet   TAKE 1 TABLET BY MOUTH ONCE DAILY     Family History     Problem Relation (Age of Onset)    Arthritis Mother    Diabetes Mother, Father    Early death Sister, Sister    Heart disease Sister, Maternal Grandfather, Mother    Heart failure Mother    Hypertension Mother        Tobacco Use    Smoking status: Current Some Day Smoker     Packs/day: 0.50     Years: 45.00     Pack years: 22.50     Types: Cigarettes    Smokeless tobacco: Never Used   Substance and Sexual Activity    Alcohol use: No    Drug use: No    Sexual activity: Not Currently     Review of Systems     All systems reviewed and are negative except as noted per above.    Objective:     Vital Signs (Most Recent):  Temp: 98.1 °F (36.7 °C) (11/07/20 1750)  Pulse: 88 (11/07/20 1935)  Resp: 18 (11/07/20 1750)  BP: (!) 165/90 (11/07/20 1935)  SpO2: 100 % (11/07/20 1631) Vital Signs (24h Range):  Temp:  [98.1 °F (36.7 °C)-98.4 °F (36.9 °C)] 98.1 °F (36.7 °C)  Pulse:  [] 88  Resp:  [18] 18  SpO2:  [98 %-100 %] 100 %  BP: (134-171)/() 165/90     Weight: 60.8 kg (134 lb)  Body mass index is 22.3 kg/m².    Physical Exam    Gen: alert, responsive  HEENT:  Eyes - no pallor  External ears with no lesions  Nares patent  Mouth - lips chapped  CV: iRRR  Lungs: CRACKLES  Abd: +BS, soft, NT, ND  Ext: +chronic atrophy; no edema  Skin: warm, dry  Neuro: grossly intact  Psych: pleasant    Significant Labs:   CBC:   Recent Labs   Lab 11/06/20  0600 11/07/20  1242   WBC 8.87 6.12   HGB 8.4* 7.1*   HCT 27.7* 24.2*    304     CMP:   Recent Labs   Lab 11/06/20  0600 11/07/20  1242   *  134* 141   K 4.2  4.2 4.5   CL 95  95 98   CO2 23  23 28   GLU 66*  66* 103   BUN 45*  45* 38*   CREATININE 7.0*  7.0* 5.2*   CALCIUM 7.6*  7.6* 8.2*   PROT  --  7.1   ALBUMIN 2.4* 2.6*   BILITOT  --  0.9   ALKPHOS  --  93   AST  --  14   ALT  --  10   ANIONGAP 16  16 15   EGFRNONAA 5.3*  5.3* 7.6*      Significant Imaging:    Imaging Results          X-Ray Chest AP Portable (Final result)  Result time 11/07/20 12:59:05    Final result by Harish Liu MD (11/07/20 12:59:05)                 Impression:      Development of small right pleural effusion.    Cardiomegaly with evidence of pulmonary vascular congestion.      Electronically signed by: Harish Liu MD  Date:    11/07/2020  Time:    12:59             Narrative:    EXAMINATION:  XR CHEST AP PORTABLE    CLINICAL HISTORY:  Rectal bleeding;    COMPARISON:  11/04/2020    FINDINGS:  Cardiac silhouette size is enlarged and stable from prior.  Mitral valve replacement.  Atherosclerotic calcification of the aorta.    Small right pleural effusion.  Trace fluid within the right minor fissure.  Prominent central pulmonary vascularity without missy pulmonary edema.  No acute osseous abnormality.                                  Assessment/Plan:     Acute Blood Loss Anemia  Due to GI bleed  Complicated by anemia of chronic disease  Chronically anticoagulated due to a-fib with atrial thrombus  - trending H&H  - pRBC transfusion  - PPI   - holding home coumadin  - daily INR  - echo  - GI and cardiology consulted, thank you    ESRD  - HD today  - nephrology consulted, thank you    Toe pain / Injury  - podiatry consulted, thank you    Recently discharged for sepsis  Due to UTI vs PNA  - continue zosyn    Other chronic conditions: home meds as appropriate  Electrolyte derangement:  Replacement prn  VTE ppx: SCDs, actively bleeding  DNR    VTE Risk Mitigation (From admission, onward)         Ordered     IP VTE HIGH RISK PATIENT  Once      11/07/20 1450     Place sequential compression device  Until discontinued      11/07/20 1450     Reason for No Pharmacological VTE Prophylaxis  Once     Question:  Reasons:  Answer:  Active Bleeding    11/07/20 1450              Darlene Lin MD  Department of Hospital Medicine   [facility]

## 2020-11-08 NOTE — PLAN OF CARE
11/08/20 1120   Discharge Assessment   Assessment Type Discharge Planning Assessment   Confirmed/corrected address and phone number on facesheet? Yes   Assessment information obtained from? Patient;Medical Record   Prior to hospitilization cognitive status: Alert/Oriented   Prior to hospitalization functional status: Assistive Equipment;Needs Assistance   Current cognitive status: Alert/Oriented   Current Functional Status: Assistive Equipment;Needs Assistance   Facility Arrived From: Home   Lives With sibling(s)   Able to Return to Prior Arrangements yes   Is patient able to care for self after discharge? Unable to determine at this time (comments)   Who are your caregiver(s) and their phone number(s)? Sisters Aide Watson [phone] and Elvi Hi [phone], pt's son Jovany Neely [phone]   Readmission Within the Last 30 Days previous discharge plan unsuccessful   If yes, most recent facility name: [facility]   Patient currently being followed by outpatient case management? No   Patient currently receives any other outside agency services? Yes   Name and contact number of agency or person providing outside services [facility]   Is it the patient/care giver preference to resume care with the current outside agency? Yes   Equipment Currently Used at Home walker, rolling;3-in-1 commode;wound care supplies;hospital bed;wheelchair   Part D Coverage [employer]   Do you have any problems affording any of your prescribed medications? No   Is the patient taking medications as prescribed? yes   Does the patient have transportation home? Yes   Transportation Anticipated family or friend will provide   Dialysis Name and Scheduled days [facility] First shift 6:30am   Does the patient receive services at the Coumadin Clinic? No   Discharge Plan A Home with family;Home Health   Discharge Plan B Home with family;Home Health   DME Needed Upon Discharge  none    Patient/Family in Agreement with Plan unable to assess   Readmission Questionnaire   At the time of your discharge, did someone talk to you about what your health problems were? Yes   At the time of discharge, did someone talk to you about what to watch out for regarding worsening of your health problem? Yes   At the time of discharge, did someone talk to you about what to do if you experienced worsening of your health problem? Yes   At the time of discharge, did someone talk to you about which medication to take when you left the hospital and which ones to stop taking? Yes   At the time of discharge, did someone talk to you about when and where to follow up with a doctor after you left the hospital? Yes   What do you believe caused you to be sick enough to be re-admitted? Not sure. started to have rectal bleeding   How often do you need to have someone help you when you read instructions, pamphlets, or other written material from your doctor or pharmacy? Rarely   Do you have problems taking your medications as prescribed? No   Do you have any problems affording any of  your prescribed medications? No   Do you have problems obtaining/receiving your medications? No   Does the patient have transportation to healthcare appointments? Yes   Living Arrangements house   Does the patient have family/friends to help with healtcare needs after discharge? yes   Does your caregiver provide all the help you need? Yes   Are you currently feeling confused? No   Are you currently having problems thinking? No   Are you currently having memory problems? No   DPS Date:  TBD  PCP: Dr. Kalie Neely  Meds to Beds:  TBD  Pharmacy: [facility]  [facility] Health: [facility] [place] Needs:  TB   Advance Directives/ POA: pt was provided 5 wish booklet on last admission. Pt's son is POA.

## 2020-11-08 NOTE — PT/OT/SLP PROGRESS
Occupational Therapy      Patient Name:  Griselda Neely   MRN:  [id number]    Patient not seen today secondary to unwilling to participate.  Will follow-up 11/9.   Jayy Collins OT  11/8/2020

## 2020-11-08 NOTE — PLAN OF CARE
11/07/20 2000   Patient Assessment/Suction   Level of Consciousness (AVPU) alert   Respiratory Effort Unlabored   Expansion/Accessory Muscles/Retractions no use of accessory muscles   All Lung Fields Breath Sounds clear   Rhythm/Pattern, Respiratory unlabored   Cough Frequency no cough   PRE-TX-O2   O2 Device (Oxygen Therapy) Oxymask   Flow (L/min) 4   SpO2 99 %   Pulse 80   Resp 18   Aerosol Therapy   $ Aerosol Therapy Charges PRN treatment not required   Respiratory Treatment Status (SVN) PRN treatment not required   Respiratory Evaluation   $ Care Plan Tech Time 15 min   Evaluation For   (CARE PLAN)

## 2020-11-08 NOTE — CONSULTS
[facility]  Department of Cardiology  Consult Note      PATIENT NAME: Griselda Neely  MRN: [id number]  TODAY'S DATE: 11/08/2020  ADMIT DATE: 11/7/2020                          CONSULT REQUESTED BY: Darlene Lin MD    SUBJECTIVE     PRINCIPAL PROBLEM: Acute blood loss anemia  75 year old female with PMHx ESRD on HD, parox Afib on Coumadin, Chronic HF, PVD presents to the ED with encephalopathy.  Patient has no memory of tonight's events.  History supplemented by ED MD and chart.  Patient recently admitted with fever and encephalopathy 10/26.  CXR revealing of pnuemonia.  She was also treated for Afib with RVR during that hospital stay.  Patient discharged 10/29 after declining further treatment and was discharged on Augmentin.  Patient reports she took Augmentin as prescribed and had been feeling well at home other than a cough with clear sputum. No fevers or chills.  Per ED MD, patient's family noted she went to bed in her usual state of health but was later found on the floor confused at approximately 0200.  In the ED she was noted to have a 103 fever and was in Afib with RVR.  Rate has improved with improvement in temperature. RN reports patient quite confused on presentation but is now improved from a mentation standpoint.  She is able to answer my orientation questions of name, location, year though initially tells me she already answered these questions. She does not recall being confused or being found on the floor.  In the ED, breath sounds are quite rhonchus. She reports she does not really make urine any more at home.     REASON FOR CONSULT:  Chest pain  RN notified me that patient felt SOB and had chest pressure.  STAT EKG done:  Afib, no ST elevation.  Rate controlled HR in the 80s.  100% on oxy mask 5L as she is a mouth breather.  Is normally on 4L NC at home.  Patient with cough with clear sputum. Her son was initially not in the room but was present upon my arrival. STAT Troponin done  and 0.031, which is less than the previous of 0.051.  Repeat H/H done which is 8.1/26.9 which is up from 7.1/24.2.  RN reports that she went to HD but they were not able to get as much fluid off as they wished. Repeat CXR done stat and reveals persistent right effusion that does not look significantly changed to me. Patient also reports headache and a scab in her left nostril that she requested I look at. She does have a scan in her left nostril with some mild oozing but not significant.  RN started humidified oxygen.  I discussed not to pick at scan with finger or tissue and this will need to heal itself overtime.  I suggested O2 could be drying out her nasal passages but she told me she doubted it as she has been on oxygen for quite a while and this has never happened before. Will continue to monitor vitals and any change in clinical course.     HPI:  Patient is 75-year-old lady 1 the with past medical history of end-stage renal disease on hemodialysis and paroxysmal atrial fibrillation on Coumadin he had been in the hospital since 10/26  Patient done of the chest discomfort yesterday it was substernal and she also felt short of breath.  EKG was done did not show any new changes.  At the present time patient is comfortable not any acute distress denies any chest pain or tightness or heaviness.  Her breathing is stable.  Denies any dizziness or lightheadedness or loss of consciousness.    Review of patient's allergies indicates:  No Known Allergies    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    both legs    CHF (congestive heart failure)     Encounter for blood transfusion 03/2016    Gout     Hemodialysis access, AV graft     Hypertension     Mitral valve regurgitation     Osteoarthritis     Pancreatitis     Peripheral vascular disease     Pneumonia 09/09/2017    Renal failure      Past Surgical History:   Procedure Laterality Date    ACHILLES  TENDON SURGERY Right     ANGIOGRAPHY OF ARTERIOVENOUS SHUNT Right 1/7/2020    Procedure: Fistulogram with Possible Intervention;  Surgeon: Joseph Loyola MD;  Location: [facility] CATH/EP LAB;  Service: Cardiology;  Laterality: Right;    ANGIOGRAPHY OF LOWER EXTREMITY Left 3/18/2020    Procedure: Angiogram Extremity Unilateral;  Surgeon: Ali Khoobehi, MD;  Location: [facility] CATH/EP LAB;  Service: Cardiology;  Laterality: Left;    APPENDECTOMY      CARDIAC CATHETERIZATION  07/03/2017    CHOLECYSTECTOMY      COLONOSCOPY Left 10/4/2020    Procedure: COLONOSCOPY;  Surgeon: Jordan Kilpatrick MD;  Location: [facility] ENDO;  Service: Endoscopy;  Laterality: Left;    ESOPHAGOGASTRODUODENOSCOPY Left 8/17/2020    Procedure: EGD (ESOPHAGOGASTRODUODENOSCOPY);  Surgeon: Talat Trevizo MD;  Location: [facility] ENDO;  Service: Endoscopy;  Laterality: Left;    ESOPHAGOGASTRODUODENOSCOPY Left 10/2/2020    Procedure: EGD (ESOPHAGOGASTRODUODENOSCOPY);  Surgeon: Talat Trevizo MD;  Location: [facility] ENDO;  Service: Endoscopy;  Laterality: Left;    heal surgery Right     HYSTERECTOMY      INSERTION OF STENT INTO PERIPHERAL VESSEL N/A 1/7/2020    Procedure: INSERTION, STENT, VESSEL, PERIPHERAL;  Surgeon: Joseph Loyola MD;  Location: [facility] CATH/EP LAB;  Service: Cardiology;  Laterality: N/A;    MITRAL VALVE REPLACEMENT      PARATHYROIDECTOMY      PARATHYROIDECTOMY  07/13/2017    PERCUTANEOUS TRANSLUMINAL ANGIOPLASTY OF ARTERIOVENOUS FISTULA N/A 1/7/2020    Procedure: PTA, AV FISTULA;  Surgeon: Joseph Loyola MD;  Location: [facility] CATH/EP LAB;  Service: Cardiology;  Laterality: N/A;    PERITONEAL CATHETER INSERTION      RENAL BIOPSY      vocal cord nodule      WOUND DEBRIDEMENT Left 7/13/2020    Procedure: DEBRIDEMENT, WOUND;  Surgeon: Carlos Elam III, MD;  Location: [facility] OR;  Service: General;  Laterality: Left;     Social History     Tobacco Use    Smoking status: Current Some Day Smoker     Packs/day: 0.50     Years: 45.00      Pack years: 22.50     Types: Cigarettes    Smokeless tobacco: Never Used   Substance Use Topics    Alcohol use: No    Drug use: No     Family History      Problem Relation (Age of Onset)     Arthritis Mother     Diabetes Mother, Father     Early death Sister, Sister     Heart disease Sister, Maternal Grandfather, Mother     Heart failure Mother     Hypertension Mother          REVIEW OF SYSTEMS  CONSTITUTIONAL: Negative for chills, fatigue and fever.   EYES: No double vision, No blurred vision  NEURO: No headaches, No dizziness  RESPIRATORY:  Shortness of breath and dyspnea on exertion much improved now.  CARDIOVASCULAR:  Had chest pain yesterday.. Negative for palpitations and leg swelling.   GI: Negative for abdominal pain, No melena, diarrhea, nausea and vomiting.   : Negative for dysuria and frequency, Negative for hematuria  SKIN: Negative for bruising, Negative for edema or discoloration noted.   ENDOCRINE: Negative for polyphagia, Negative for heat intolerance, Negative for cold intolerance  PSYCHIATRIC: Negative for depression, Negative for anxiety, Negative for memory loss  MUSCULOSKELETAL: Negative for neck pain, Negative for muscle weakness, Negative for back pain     OBJECTIVE     VITAL SIGNS (Most Recent)  Temp: 97.3 °F (36.3 °C) (11/08/20 1149)  Pulse: 80 (11/08/20 1149)  Resp: 19 (11/08/20 1149)  BP: (!) 172/83 (11/08/20 1149)  SpO2: 99 % (11/08/20 1149)    VENTILATION STATUS  Resp: 19 (11/08/20 1149)  SpO2: 99 % (11/08/20 1149)       I & O (Last 24H):    Intake/Output Summary (Last 24 hours) at 11/8/2020 1235  Last data filed at 11/7/2020 2300  Gross per 24 hour   Intake 615 ml   Output 507 ml   Net 108 ml       WEIGHTS  Wt Readings from Last 1 Encounters:   11/07/20 1151 60.8 kg (134 lb)       PHYSICAL EXAM  GENERAL: well built, well nourished, well-developed in no apparent distress alert and oriented.   HEENT: Normocephalic. Pupils normal   NECK: No JVD. No bruit..   THYROID: Thyroid not  enlarged.  CARDIAC: Regular rate and rhythm. S1 is normal.S2 is normal.No gallops, clicks or murmurs noted at this time.  CHEST ANATOMY: normal.   LUNGS:  Bilateral decreased breath sounds in both lung bases  ABDOMEN: Soft    URINARY: No núñez catheter   EXTREMITIES: No cyanosis, clubbing or edema noted at this time., no calf tenderness bilaterally.   PERIPHERAL VASCULAR SYSTEM: Good palpable distal pulses.   CENTRAL NERVOUS SYSTEM: No focal motor or sensory deficits noted.   SKIN: Skin without lesions, moist, well perfused.   MUSCLE STRENGTH & TONE: No noteable weakness, atrophy or abnormal movement.     HOME MEDICATIONS:  No current facility-administered medications on file prior to encounter.      Current Outpatient Medications on File Prior to Encounter   Medication Sig Dispense Refill    amoxicillin-clavulanate 500-125mg (AUGMENTIN) 500-125 mg Tab Take 1 tablet (500 mg total) by mouth every evening. Take AFTER DIALYSIS on dialysis days. for 7 days 7 tablet 0    aspirin (ECOTRIN) 81 MG EC tablet Take 1 tablet (81 mg total) by mouth once daily. 30 tablet 0    calcium acetate (PHOSLO) 667 mg capsule Take 667 mg by mouth once daily.       diltiaZEM (CARDIZEM CD) 180 MG 24 hr capsule Take 1 capsule by mouth once daily (Patient taking differently: Take 180 mg by mouth once daily. ) 90 capsule 0    folic acid/vit B complex and C (JENNIFER-KODAK ORAL) Take 1 tablet by mouth once daily.      isosorbide mononitrate (IMDUR) 30 MG 24 hr tablet Take 30 mg by mouth once daily.      losartan (COZAAR) 25 MG tablet Take 25 mg by mouth once daily.      magnesium oxide (MAG-OX) 400 mg (241.3 mg magnesium) tablet Take 1 tablet by mouth once daily (Patient taking differently: Take 400 mg by mouth once daily. ) 30 tablet 0    metoprolol succinate (TOPROL-XL) 25 MG 24 hr tablet Take 1 tablet (25 mg total) by mouth nightly. 30 tablet 0    ondansetron (ZOFRAN-ODT) 4 MG TbDL Take 4 mg by mouth every 6 (six) hours as needed.       oxyCODONE-acetaminophen (PERCOCET) 5-325 mg per tablet Take 1 tablet by mouth every 6 (six) hours as needed for Pain.      pantoprazole (PROTONIX) 40 MG tablet Take 1 tablet (40 mg total) by mouth once daily. 90 tablet 0    warfarin (COUMADIN) 2.5 MG tablet Take 2.5 mg by mouth once daily.      zinc sulfate 220 mg Tab tablet Take 220 mg by mouth every other day.      clindamycin (CLEOCIN) 300 MG capsule Take 300 mg by mouth 3 (three) times daily.       enoxaparin (LOVENOX) 60 mg/0.6 mL Syrg Inject 60 mg into the skin once daily.       wheelchair An 1 Device by Misc.(Non-Drug; Combo Route) route as needed (needed for transport).  0       SCHEDULED MEDS:   calcium acetate(phosphat bind)  667 mg Oral Daily    chlorhexidine  15 mL Mouth/Throat BID    diltiaZEM  180 mg Oral Daily    [START ON 11/9/2020] epoetin alfonzo-epbx  100 Units/kg Subcutaneous Every Mon, Wed, Fri    magnesium oxide  400 mg Oral Daily    metoprolol succinate  25 mg Oral Nightly    pantoprazole  40 mg Intravenous BID    piperacillin-tazobactam (ZOSYN) IVPB  3.375 g Intravenous Q12H    [START ON 11/9/2020] sodium thiosulfate  25 g Intravenous Every Mon, Wed, Fri       CONTINUOUS INFUSIONS:    PRN MEDS:sodium chloride, acetaminophen, albuterol-ipratropium, dextrose 50%, dextrose 50%, glucagon (human recombinant), glucose, glucose, hydrALAZINE, melatonin, metoprolol, morphine, ondansetron, polyethylene glycol, prochlorperazine, sodium chloride 0.9%    LABS AND DIAGNOSTICS     CBC LAST 3 DAYS  Recent Labs   Lab 11/05/20  0246 11/06/20  0600 11/07/20  1242 11/07/20  2030   WBC 14.16* 8.87 6.12  --    RBC 3.09* 3.12* 2.67*  --    HGB 8.4* 8.4* 7.1* 8.1*   HCT 27.9* 27.7* 24.2* 26.9*   MCV 90 89 91  --    MCH 27.2 26.9* 26.6*  --    MCHC 30.1* 30.3* 29.3*  --    RDW 17.8* 17.8* 17.9*  --     292 304  --    MPV 10.6 10.9 10.8  --    GRAN 90.6*  12.8* 84.8*  7.5 77.3*  4.7  --    LYMPH 4.3*  0.6* 7.9*  0.7* 13.1*  0.8*  --    MONO  3.9*  0.6 5.4  0.5 8.0  0.5  --    BASO 0.03 0.02 0.02  --    NRBC 0 0 0  --        COAGULATION LAST 3 DAYS  Recent Labs   Lab 11/04/20 0249 11/05/20 0246 11/07/20  1242   LABPT 19.7* 22.2* 23.7*   INR 1.8 2.0 2.2   APTT  --   --  49.6*       CHEMISTRY LAST 3 DAYS  Recent Labs   Lab 11/04/20 0249 11/04/20  0836 11/05/20  0246 11/06/20  0600 11/07/20  1242     --  139  139 134*  134* 141   K 5.7*  --  4.4  4.4 4.2  4.2 4.5     --  100  100 95  95 98   CO2 25  --  23  23 23  23 28   ANIONGAP 15  --  16  16 16  16 15   BUN 40*  --  31*  31* 45*  45* 38*   CREATININE 6.9*  --  5.7*  5.7* 7.0*  7.0* 5.2*   GLU 85  --  72  72 66*  66* 103   CALCIUM 7.6*  --  8.2*  8.2* 7.6*  7.6* 8.2*   MG  --  1.6 1.8 1.9  --    ALBUMIN 2.8*  --  2.4* 2.4* 2.6*   PROT 7.7  --   --   --  7.1   ALKPHOS 131  --   --   --  93   ALT 16  --   --   --  10   AST 34  --   --   --  14   BILITOT 1.3*  --   --   --  0.9       CARDIAC PROFILE LAST 3 DAYS  Recent Labs   Lab 11/04/20 0249 11/04/20  0836 11/07/20  2030   BNP >4,500*  --   --    TROPONINI 0.057* 0.051* 0.031       ENDOCRINE LAST 3 DAYS  Recent Labs   Lab 11/04/20  0303   PROCAL 2.10*       LAST ARTERIAL BLOOD GAS  ABG  No results for input(s): PH, PO2, PCO2, HCO3, BE in the last 168 hours.    LAST 7 DAYS MICROBIOLOGY   Microbiology Results (last 7 days)     ** No results found for the last 168 hours. **          MOST RECENT IMAGING  X-Ray Chest AP Portable  HISTORY: Chest pain.    FINDINGS: Portable chest radiograph at 2100 hours compared to 1240  hours shows stable enlarged cardiac silhouette, with normal pulmonary  vascularity and aortic vascular calcifications. Previous mild central  pulmonary vascular congestion has resolved.    There are persistent right perihilar and right basilar airspace  opacities, with right pleural effusion obscuring right hemidiaphragm.  The right upper lung and left lung are normally expanded and clear,  with no large  left pleural effusion or evidence of pulmonary edema. No  pneumothorax. The bones are diffusely osteopenic.    IMPRESSION:  1. Persistent cardiomegaly, with resolution of previous central  pulmonary vascular congestion.  2. Unchanged right perihilar and right basilar airspace opacities,  potentially atelectasis, with small right pleural effusion.    Electronically Signed by Brian CARCAMO on 11/8/2020 6:35 AM      ECHOCARDIOGRAM RESULTS (last 5)  Results for orders placed during the hospital encounter of 07/22/20   Transesophageal echo (TRUPTI)    Narrative Indication:   Left atrial thrombus.     Counseling:   Patient as well as her family member were informed of the risks, benefits   as well as alternatives to the procedure and informed consent was   obtained.     Procedure:  After obtaining informed consent the patient was brought in to the cath   lab in a fasting state and with an IV in place. Patient was sedated with   anesthesia team help. A TRUPTI probe was advanced without difficulty and   images were obtained. Patient tolerated the procedure well and no   immediate complications were noted. Patient was transferred out of the   heart center in stable medical condition.     Complications:   None     Findings:  1.  Large echogenic mass  in the roof of the left atrium seen on previous   TRUPTI has resolved. Significant amount of smoke and possible soft thrombus   noted in the left atrial appendage extending into the left atrium.  2.  Mitral valve bioprosthesis in stable position.  Leaflets appear to be   moving well.  Mild mitral regurgitation noted.  3.  Mild tricuspid regurgitation noted.  4. Aortic valve appears calcified. There appears to be mild restrictions   of leaflet motion. No significant regurgitation.   5. Pulmonic valve was not well visualized.   6.  No obvious immediate complications.    Conclusions:  1.  Large echogenic mass in the roof of the left atrium seen on previous   TRUPTI has resolved. Significant  amount of smoke and possible soft thrombus   noted in the left atrial appendage extending into the left atrium.     Results for orders placed during the hospital encounter of 01/02/20   Transesophageal echo (TRUPTI)    Narrative 1.  Large echogenic mass likely representing clot noted in the roof of the   left atrium and in the left atrial appendage.  2.  Mitral valve bioprosthesis in stable position.  Leaflets appear to be   moving well.  Mild mitral regurgitation noted.  3.  Mild tricuspid regurgitation noted.  4.  Mild plaque noted in the visualized portions of the descending   thoracic aorta.  5.  No obvious immediate complications.   Echo Color Flow Doppler? Yes    Narrative · Eccentric left ventricular hypertrophy.  · Severely decreased left ventricular systolic function. The estimated   ejection fraction is 25%.  · Grade IV (severe) left ventricular diastolic dysfunction.  · Moderate right ventricular enlargement.  · Moderately reduced right ventricular systolic function.  · Severe left atrial enlargement.  · Layered left atrial thrombus suggested. The thrombus is fixed and   located in the inferior cavity.  · Moderate right atrial enlargement.  · There is a bioprosthetic mitral valve. Prosthetic mitral valve is   normal.  · Moderate tricuspid regurgitation.  · Mild to moderate pulmonary hypertension present. Estimated PA systolic   pressure 50 mm of Hg.  · Mild to moderate pulmonic regurgitation.  · Small posterior pericardial effusion.      Results for orders placed during the hospital encounter of 11/13/17   2D echo with color flow doppler    Narrative Date of Procedure: 11/13/2017        TEST DESCRIPTION   Technical Quality: This is a technically adequate study.     Aorta: The aortic root is normal in size, measuring 2.1 cm at sinotubular junction and 2.7 cm at Sinuses of Valsalva. The proximal ascending aorta is normal in size, measuring 3.0 cm across.     Left Atrium: The left atrial volume index is  severely enlarged, measuring 118.16 cc/m2.     Left Ventricle: The left ventricle is normal in size, with an end-diastolic diameter of 5.4 cm, and an end-systolic diameter of 3.5 cm. LV wall thickness is normal, with the septum and the posterior wall each measuring 0.9 cm across. Relative wall   thickness was normal at 0.33, and the LV mass index was increased at 136.0 g/m2 consistent with severe eccentric left ventricular hypertrophy. There are no regional wall motion abnormalities. Left ventricular systolic function appears hyperdynamic.   Visually estimated ejection fraction is >70%.         Right Atrium: The right atrium is normal in size, measuring 4.5 cm in length and 2.8 cm in width in the apical view.     Right Ventricle: The right ventricle is normal in size measuring 4.6 cm at the base in the apical right ventricle-focused view. Global right ventricular systolic function appears normal. Tricuspid annular plane systolic excursion (TAPSE) is 2.5 cm.   Tissue Doppler-derived tricuspid annular peak systolic velocity (S prime) is 15.3 cm/s. The estimated PA systolic pressure is 29 mmHg.     Aortic Valve:  The aortic valve is mildly sclerotic with normal leaflet mobility.     Mitral Valve:  The mitral valve is mildly sclerotic with mildly restricted leaflet mobility. The mean gradient obtained across the mitral valve is 8 mmHg. The pressure half time is 112.0 msec. The calculated mitral valve area is 1.96 cm2 consistent with   mild mitral stenosis. There is moderate to severe mitral regurgitation. There is marked mitral annular calcification.     Tricuspid Valve:  The tricuspid valve is normal in structure. There is mild tricuspid regurgitation.     Pulmonary Valve:  The pulmonic valve is normal in structure.     IVC: IVC is normal in size and collapses > 50% with a sniff, suggesting normal right atrial pressure of 3 mmHg.     Intracavitary: There is no evidence of pericardial effusion, intracavity mass,  thrombi, or vegetation.         CONCLUSIONS     1 - Eccentric LVH with hyperdynamic left ventricular systolic function (EF >70%).     2 - Severe left atrial enlargement.     3 - Normal right ventricular systolic function .     4 - Significant mitral annular calcification with mean gradient of 8mmHg and +3 MR.             This document has been electronically    SIGNED BY: Elyssa Carbajal MD On: 11/13/2017 10:25       CURRENT/PREVIOUS VISIT EKG  Results for orders placed or performed during the hospital encounter of 11/07/20   EKG 12-lead    Collection Time: 11/07/20  9:55 PM    Narrative    Test Reason : R07.9,    Vent. Rate : 088 BPM     Atrial Rate : 227 BPM     P-R Int : 000 ms          QRS Dur : 094 ms      QT Int : 414 ms       P-R-T Axes : 000 -25 103 degrees     QTc Int : 500 ms    Atrial fibrillation  ST and T wave abnormality, consider anterolateral ischemia  Abnormal ECG  When compared with ECG of 07-NOV-2020 12:45,  No significant change was found    Referred By: [email or web address]   SELF           Confirmed By:            ASSESSMENT/PLAN:     Active Hospital Problems    Diagnosis    *Acute blood loss anemia    Rectal bleeding    Chronic systolic heart failure    DNR (do not resuscitate)    Anemia due to chronic kidney disease     Likely of chronic disease.       Pulmonary hypertension    Other persistent atrial fibrillation    ESRD (end stage renal disease) on HD M,W, F    Gastroesophageal reflux disease    HTN (hypertension)       ASSESSMENT & PLAN:   1.  Chest discomfort and chest pressure angina  2.  Persistent atrial fibrillation  3.  Mass in the left atrium.  4.  Acute blood loss anemia  5.  Rectal bleeding  6.  Chronic systolic heart the  7.  Pulmonary hypertension  8.  End-stage renal disease on hemodialysis  9.  Essential hypertension  10.  Gastroesophageal reflux disease      RECOMMENDATIO   1.  Patient is on Coumadin for atrial mass would hold off for now and switch her over to Lovenox.  2.   Persistent atrial fibrillation.  She requires anticoagulation  3.  She has chest discomfort and needs further cardiac testing including Lexiscan myocardial perfusion study.  This is to evaluate ischemia.  4.  Her blood pressure remains elevated would add amlodipine 2.5 mg p.o. b.i.d. and hold amlodipine if blood pressure drops less than 120.  5.  The further recommendations to follow thank you for the consultation.  Dr. Powell would resume her care.    Vel Asif MD  [facility]  Department of Cardiology  Date of Service: 11/08/2020  12:35 PM

## 2020-11-08 NOTE — NURSING
Observed Lab attempt blood draw, unsuccessful first attempt and patient refused any further attempts

## 2020-11-08 NOTE — CONSULTS
Consult Note  Gastroenterology    Consult Requested By: hospital medicine  Reason for Consult: recurrent rectal bleeding    SUBJECTIVE:     History of Present Illness:  Patient is a 75 y.o. female presents with with recurrent rectal bleeding.  She was just D/C'ed yesterday after a lengthy stay for sepsis - she also has had recurrent rectal bleeding with anemia. Just last month José Trevizo and Finesse performed EGD/ colonoscopy respectively.  EGD was normal, colonoscopy revealed diverticulosis and one angiodysplasia with was treated with heater probe.  Pt was to have out patient capsule study.  HCT has remained sable at 29-30%.   She is on chronic coumadin for A fib. INR is 2.2 . Onset of symptoms was abrupt starting 1 day ago with unchanged course since that time. Patient denies abdominal pain. Symptoms are aggravated by none. Symptoms improve with none. Past history includes above. Previous studies include colonoscopy and upper endoscopy.     Review of patient's allergies indicates:  No Known Allergies    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    both legs    CHF (congestive heart failure)     Encounter for blood transfusion 03/2016    Gout     Hemodialysis access, AV graft     Hypertension     Mitral valve regurgitation     Osteoarthritis     Pancreatitis     Peripheral vascular disease     Pneumonia 09/09/2017    Renal failure      Past Surgical History:   Procedure Laterality Date    ACHILLES TENDON SURGERY Right     ANGIOGRAPHY OF ARTERIOVENOUS SHUNT Right 1/7/2020    Procedure: Fistulogram with Possible Intervention;  Surgeon: Joseph Loyola MD;  Location: [facility] CATH/EP LAB;  Service: Cardiology;  Laterality: Right;    ANGIOGRAPHY OF LOWER EXTREMITY Left 3/18/2020    Procedure: Angiogram Extremity Unilateral;  Surgeon: Ali Khoobehi, MD;  Location: [facility] CATH/EP LAB;  Service: Cardiology;  Laterality: Left;    APPENDECTOMY       CARDIAC CATHETERIZATION  07/03/2017    CHOLECYSTECTOMY      COLONOSCOPY Left 10/4/2020    Procedure: COLONOSCOPY;  Surgeon: Jordan Kilpatrick MD;  Location: [facility] ENDO;  Service: Endoscopy;  Laterality: Left;    ESOPHAGOGASTRODUODENOSCOPY Left 8/17/2020    Procedure: EGD (ESOPHAGOGASTRODUODENOSCOPY);  Surgeon: Talat Trevizo MD;  Location: [facility] ENDO;  Service: Endoscopy;  Laterality: Left;    ESOPHAGOGASTRODUODENOSCOPY Left 10/2/2020    Procedure: EGD (ESOPHAGOGASTRODUODENOSCOPY);  Surgeon: Talat Trevizo MD;  Location: [facility] ENDO;  Service: Endoscopy;  Laterality: Left;    heal surgery Right     HYSTERECTOMY      INSERTION OF STENT INTO PERIPHERAL VESSEL N/A 1/7/2020    Procedure: INSERTION, STENT, VESSEL, PERIPHERAL;  Surgeon: Joseph Loyola MD;  Location: [facility] CATH/EP LAB;  Service: Cardiology;  Laterality: N/A;    MITRAL VALVE REPLACEMENT      PARATHYROIDECTOMY      PARATHYROIDECTOMY  07/13/2017    PERCUTANEOUS TRANSLUMINAL ANGIOPLASTY OF ARTERIOVENOUS FISTULA N/A 1/7/2020    Procedure: PTA, AV FISTULA;  Surgeon: Joseph Loyola MD;  Location: [facility] CATH/EP LAB;  Service: Cardiology;  Laterality: N/A;    PERITONEAL CATHETER INSERTION      RENAL BIOPSY      vocal cord nodule      WOUND DEBRIDEMENT Left 7/13/2020    Procedure: DEBRIDEMENT, WOUND;  Surgeon: Carlos Elam III, MD;  Location: [facility] OR;  Service: General;  Laterality: Left;     Family History   Problem Relation Age of Onset    Heart disease Sister     Early death Sister         heart as baby    Heart disease Maternal Grandfather     Diabetes Mother     Hypertension Mother     Heart failure Mother     Heart disease Mother     Arthritis Mother     Diabetes Father     Early death Sister         infant     Social History     Tobacco Use    Smoking status: Current Some Day Smoker     Packs/day: 0.50     Years: 45.00     Pack years: 22.50     Types: Cigarettes    Smokeless tobacco: Never Used   Substance Use Topics     Alcohol use: No    Drug use: No       Review of Systems:  GENERAL:  No weight loss, fever or chills.  HEENT:  No changes in vision, rhinorrhea, nasal congestion.  PULMONARY:  No shortness of breath, coughing or wheezing.  CARDIOVASCULAR:  No chest pain, palpitations or syncope.  GI:  No abdominal pain, nausea, vomiting, diarrhea or constipation.  :  No dysuria, urgency or frequency.  MUSCULOSKELETAL:  No joint pain or arthralgia.  SKIN:  No rashes or skin breakdown.  ENDOCRINE:  No polyuria or polydipsia. No thyroid disease.  NEURO/PSYCH:  No headaches or trouble with cognition. Stable mood.        OBJECTIVE:     Vital Signs (Most Recent)  Temp: 97.3 °F (36.3 °C) (11/08/20 1149)  Pulse: 80 (11/08/20 1149)  Resp: 19 (11/08/20 1149)  BP: (!) 172/83 (11/08/20 1149)  SpO2: 99 % (11/08/20 1149)    Temperature Range Min/Max (Last 24H):  Temp:  [97.1 °F (36.2 °C)-98.3 °F (36.8 °C)]     Physical Exam:  GENERAL:  No apparent distress. Alert and oriented times three  HEENT:  PERRLA, EOMI. Conjunctivae intact. Posterior pharynx clear  NECK:  Supple with no lymphadenopathy or thyromegaly  LUNGS:  No respiratory distress. Clear to auscultation bilaterally with good air movement  CARDIAC:  RRR without murmur, rub or gallop  ABDOMEN:  Positive bowel sounds. Nontender and nondistended. No organomegaly  EXTREMITIES:  Peripheral pulses are 2+. Hands and feet are warm. Good capillary refill in fingers. No clubbing, cyanosis or edema  SKIN:  Skin color, texture and turgor normal. No rashes, ulcerations or nodules  NEUROLOGIC:  CN II-XII intact. Light touch sensation intact. Muscle strength 5/5 in all extremities     Laboratory:  CBC:   Recent Labs   Lab 11/07/20  1242 11/07/20  2030   WBC 6.12  --    RBC 2.67*  --    HGB 7.1* 8.1*   HCT 24.2* 26.9*     --    MCV 91  --    MCH 26.6*  --    MCHC 29.3*  --      CMP:   Recent Labs   Lab 11/07/20  1242      CALCIUM 8.2*   ALBUMIN 2.6*   PROT 7.1      K 4.5   CO2 28    CL 98   BUN 38*   CREATININE 5.2*   ALKPHOS 93   ALT 10   AST 14   BILITOT 0.9     Coagulation:   Recent Labs   Lab 11/07/20  1242   INR 2.2   APTT 49.6*       Diagnostic Results:  No Further    Imaging Reports:  Imaging Results          X-Ray Chest AP Portable (Final result)  Result time 11/07/20 12:59:05    Final result by Harish Liu MD (11/07/20 12:59:05)                 Impression:      Development of small right pleural effusion.    Cardiomegaly with evidence of pulmonary vascular congestion.      Electronically signed by: Harish Liu MD  Date:    11/07/2020  Time:    12:59             Narrative:    EXAMINATION:  XR CHEST AP PORTABLE    CLINICAL HISTORY:  Rectal bleeding;    COMPARISON:  11/04/2020    FINDINGS:  Cardiac silhouette size is enlarged and stable from prior.  Mitral valve replacement.  Atherosclerotic calcification of the aorta.    Small right pleural effusion.  Trace fluid within the right minor fissure.  Prominent central pulmonary vascularity without missy pulmonary edema.  No acute osseous abnormality.                                  ASSESSMENT/PLAN:   History of Present Illness:  Patient is a 75 y.o. female presents with with recurrent rectal bleeding.  She was just D/C'ed yesterday after a lengthy stay for sepsis - she also has had recurrent rectal bleeding with anemia. Just last month José Trevizo and Finesse performed EGD/ colonoscopy respectively.  EGD was normal, colonoscopy revealed diverticulosis and one angiodysplasia with was treated with heater probe.  Pt was to have out patient capsule study.  HCT has remained sable at 29-30%.   She is on chronic coumadin for A fib. INR is 2.2 .        Plan: I suspect bleeding from other angiodysplasia lesions in the SM - if possible, would decrease coumadin - suggest out pt SB capsule study..

## 2020-11-08 NOTE — PROGRESS NOTES
Pt wanting off the machine states her heart hurts. Tx ended 15 mins ealry. Pt let 283 ml positive

## 2020-11-08 NOTE — NURSING
Received patient from dialysis, patient complained of chest pain that felt like pressure was on her chest. Notified MD and performed EKG and recorded vitals. MD came to patient's room and assessed patient, ordered, troponin, H/H, CXR. Patient calmer when patient's son arrived. Will continue to monitor.

## 2020-11-08 NOTE — SUBJECTIVE & OBJECTIVE
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    both legs    CHF (congestive heart failure)     Encounter for blood transfusion 03/2016    Gout     Hemodialysis access, AV graft     Hypertension     Mitral valve regurgitation     Osteoarthritis     Pancreatitis     Peripheral vascular disease     Pneumonia 09/09/2017    Renal failure        Past Surgical History:   Procedure Laterality Date    ACHILLES TENDON SURGERY Right     ANGIOGRAPHY OF ARTERIOVENOUS SHUNT Right 1/7/2020    Procedure: Fistulogram with Possible Intervention;  Surgeon: Joseph Loyola MD;  Location: [facility] CATH/EP LAB;  Service: Cardiology;  Laterality: Right;    ANGIOGRAPHY OF LOWER EXTREMITY Left 3/18/2020    Procedure: Angiogram Extremity Unilateral;  Surgeon: Ali Khoobehi, MD;  Location: [facility] CATH/EP LAB;  Service: Cardiology;  Laterality: Left;    APPENDECTOMY      CARDIAC CATHETERIZATION  07/03/2017    CHOLECYSTECTOMY      COLONOSCOPY Left 10/4/2020    Procedure: COLONOSCOPY;  Surgeon: Jordan Kilpatrick MD;  Location: [facility] ENDO;  Service: Endoscopy;  Laterality: Left;    ESOPHAGOGASTRODUODENOSCOPY Left 8/17/2020    Procedure: EGD (ESOPHAGOGASTRODUODENOSCOPY);  Surgeon: Talat Trevizo MD;  Location: [facility] ENDO;  Service: Endoscopy;  Laterality: Left;    ESOPHAGOGASTRODUODENOSCOPY Left 10/2/2020    Procedure: EGD (ESOPHAGOGASTRODUODENOSCOPY);  Surgeon: Talat Trevizo MD;  Location: [facility] ENDO;  Service: Endoscopy;  Laterality: Left;    heal surgery Right     HYSTERECTOMY      INSERTION OF STENT INTO PERIPHERAL VESSEL N/A 1/7/2020    Procedure: INSERTION, STENT, VESSEL, PERIPHERAL;  Surgeon: Joseph Loyola MD;  Location: [facility] CATH/EP LAB;  Service: Cardiology;  Laterality: N/A;    MITRAL VALVE REPLACEMENT      PARATHYROIDECTOMY      PARATHYROIDECTOMY  07/13/2017    PERCUTANEOUS TRANSLUMINAL ANGIOPLASTY OF ARTERIOVENOUS FISTULA N/A 1/7/2020    Procedure:  PTA, AV FISTULA;  Surgeon: Joseph Loyola MD;  Location: [facility] CATH/EP LAB;  Service: Cardiology;  Laterality: N/A;    PERITONEAL CATHETER INSERTION      RENAL BIOPSY      vocal cord nodule      WOUND DEBRIDEMENT Left 7/13/2020    Procedure: DEBRIDEMENT, WOUND;  Surgeon: Carlos Elam III, MD;  Location: [facility] OR;  Service: General;  Laterality: Left;       Review of patient's allergies indicates:  No Known Allergies    No current facility-administered medications on file prior to encounter.      Current Outpatient Medications on File Prior to Encounter   Medication Sig    amoxicillin-clavulanate 500-125mg (AUGMENTIN) 500-125 mg Tab Take 1 tablet (500 mg total) by mouth every evening. Take AFTER DIALYSIS on dialysis days. for 7 days    aspirin (ECOTRIN) 81 MG EC tablet Take 1 tablet (81 mg total) by mouth once daily.    calcium acetate (PHOSLO) 667 mg capsule Take 667 mg by mouth once daily.     diltiaZEM (CARDIZEM CD) 180 MG 24 hr capsule Take 1 capsule by mouth once daily (Patient taking differently: Take 180 mg by mouth once daily. )    folic acid/vit B complex and C (JENNIFER-KODAK ORAL) Take 1 tablet by mouth once daily.    isosorbide mononitrate (IMDUR) 30 MG 24 hr tablet Take 30 mg by mouth once daily.    losartan (COZAAR) 25 MG tablet Take 25 mg by mouth once daily.    magnesium oxide (MAG-OX) 400 mg (241.3 mg magnesium) tablet Take 1 tablet by mouth once daily (Patient taking differently: Take 400 mg by mouth once daily. )    metoprolol succinate (TOPROL-XL) 25 MG 24 hr tablet Take 1 tablet (25 mg total) by mouth nightly.    ondansetron (ZOFRAN-ODT) 4 MG TbDL Take 4 mg by mouth every 6 (six) hours as needed.    oxyCODONE-acetaminophen (PERCOCET) 5-325 mg per tablet Take 1 tablet by mouth every 6 (six) hours as needed for Pain.    pantoprazole (PROTONIX) 40 MG tablet Take 1 tablet (40 mg total) by mouth once daily.    warfarin (COUMADIN) 2.5 MG tablet Take 2.5 mg by mouth once daily.     zinc sulfate 220 mg Tab tablet Take 220 mg by mouth every other day.    clindamycin (CLEOCIN) 300 MG capsule Take 300 mg by mouth 3 (three) times daily.     enoxaparin (LOVENOX) 60 mg/0.6 mL Syrg Inject 60 mg into the skin once daily.     wheelchair An 1 Device by Misc.(Non-Drug; Combo Route) route as needed (needed for transport).    [DISCONTINUED] HYDROcodone-acetaminophen (NORCO) 7.5-325 mg per tablet hydrocodone 7.5 mg-acetaminophen 325 mg tablet   TAKE ONE TABLET BY MOUTH PRIOR TO WOUND DEBRIDEMENT. REPEAT DOSE IN 8 HOURS POST DEBRIDEMENT.    [DISCONTINUED] metoprolol tartrate (LOPRESSOR) 25 MG tablet metoprolol tartrate 25 mg tablet   TAKE 1 TABLET BY MOUTH EVERY 12 HOURS    [DISCONTINUED] naloxone (NARCAN) 4 mg/actuation Spry Narcan 4 mg/actuation nasal spray    [DISCONTINUED] traMADoL (ULTRAM) 50 mg tablet tramadol 50 mg tablet    [DISCONTINUED] vit b cmplx 3-fa-vit c-biotin 1- mg-mg-mcg (JENNIFER-KODAK RX) 1- mg-mg-mcg Tab Jennifer-Kodak Rx 1 mg-60 mg-300 mcg tablet   TAKE 1 TABLET BY MOUTH ONCE DAILY     Family History     Problem Relation (Age of Onset)    Arthritis Mother    Diabetes Mother, Father    Early death Sister, Sister    Heart disease Sister, Maternal Grandfather, Mother    Heart failure Mother    Hypertension Mother        Tobacco Use    Smoking status: Current Some Day Smoker     Packs/day: 0.50     Years: 45.00     Pack years: 22.50     Types: Cigarettes    Smokeless tobacco: Never Used   Substance and Sexual Activity    Alcohol use: No    Drug use: No    Sexual activity: Not Currently     Review of Systems     All systems reviewed and are negative except as noted per above.    Objective:     Vital Signs (Most Recent):  Temp: 98.1 °F (36.7 °C) (11/07/20 1750)  Pulse: 88 (11/07/20 1935)  Resp: 18 (11/07/20 1750)  BP: (!) 165/90 (11/07/20 1935)  SpO2: 100 % (11/07/20 1631) Vital Signs (24h Range):  Temp:  [98.1 °F (36.7 °C)-98.4 °F (36.9 °C)] 98.1 °F (36.7 °C)  Pulse:   [] 88  Resp:  [18] 18  SpO2:  [98 %-100 %] 100 %  BP: (134-171)/() 165/90     Weight: 60.8 kg (134 lb)  Body mass index is 22.3 kg/m².    Physical Exam    Gen: alert, responsive  HEENT:  Eyes - no pallor  External ears with no lesions  Nares patent  Mouth - lips chapped  CV: iRRR  Lungs: CRACKLES  Abd: +BS, soft, NT, ND  Ext: +chronic atrophy; no edema  Skin: warm, dry  Neuro: grossly intact  Psych: pleasant    Significant Labs:   CBC:   Recent Labs   Lab 11/06/20 0600 11/07/20  1242   WBC 8.87 6.12   HGB 8.4* 7.1*   HCT 27.7* 24.2*    304     CMP:   Recent Labs   Lab 11/06/20 0600 11/07/20  1242   *  134* 141   K 4.2  4.2 4.5   CL 95  95 98   CO2 23  23 28   GLU 66*  66* 103   BUN 45*  45* 38*   CREATININE 7.0*  7.0* 5.2*   CALCIUM 7.6*  7.6* 8.2*   PROT  --  7.1   ALBUMIN 2.4* 2.6*   BILITOT  --  0.9   ALKPHOS  --  93   AST  --  14   ALT  --  10   ANIONGAP 16  16 15   EGFRNONAA 5.3*  5.3* 7.6*     Significant Imaging:    Imaging Results          X-Ray Chest AP Portable (Final result)  Result time 11/07/20 12:59:05    Final result by Harish Liu MD (11/07/20 12:59:05)                 Impression:      Development of small right pleural effusion.    Cardiomegaly with evidence of pulmonary vascular congestion.      Electronically signed by: Harish Liu MD  Date:    11/07/2020  Time:    12:59             Narrative:    EXAMINATION:  XR CHEST AP PORTABLE    CLINICAL HISTORY:  Rectal bleeding;    COMPARISON:  11/04/2020    FINDINGS:  Cardiac silhouette size is enlarged and stable from prior.  Mitral valve replacement.  Atherosclerotic calcification of the aorta.    Small right pleural effusion.  Trace fluid within the right minor fissure.  Prominent central pulmonary vascularity without missy pulmonary edema.  No acute osseous abnormality.

## 2020-11-09 PROBLEM — G89.29 CHRONIC TOE PAIN, LEFT FOOT: Status: ACTIVE | Noted: 2020-11-09

## 2020-11-09 PROBLEM — M79.675 CHRONIC TOE PAIN, LEFT FOOT: Status: ACTIVE | Noted: 2020-11-09

## 2020-11-09 LAB
ANION GAP SERPL CALC-SCNC: 15 MMOL/L (ref 8–16)
BACTERIA BLD CULT: NORMAL
BACTERIA BLD CULT: NORMAL
BASOPHILS # BLD AUTO: 0.03 K/UL (ref 0–0.2)
BASOPHILS NFR BLD: 0.5 % (ref 0–1.9)
BLD PROD TYP BPU: NORMAL
BLOOD UNIT EXPIRATION DATE: NORMAL
BLOOD UNIT TYPE CODE: 1700
BLOOD UNIT TYPE: NORMAL
BUN SERPL-MCNC: 41 MG/DL (ref 8–23)
CALCIUM SERPL-MCNC: 7.9 MG/DL (ref 8.7–10.5)
CHLORIDE SERPL-SCNC: 100 MMOL/L (ref 95–110)
CO2 SERPL-SCNC: 20 MMOL/L (ref 23–29)
CODING SYSTEM: NORMAL
CREAT SERPL-MCNC: 6.2 MG/DL (ref 0.5–1.4)
DIFFERENTIAL METHOD: ABNORMAL
DISPENSE STATUS: NORMAL
EOSINOPHIL # BLD AUTO: 0.1 K/UL (ref 0–0.5)
EOSINOPHIL NFR BLD: 1.6 % (ref 0–8)
ERYTHROCYTE [DISTWIDTH] IN BLOOD BY AUTOMATED COUNT: 19.9 % (ref 11.5–14.5)
EST. GFR  (AFRICAN AMERICAN): 7.1 ML/MIN/1.73 M^2
EST. GFR  (NON AFRICAN AMERICAN): 6.1 ML/MIN/1.73 M^2
GLUCOSE SERPL-MCNC: 124 MG/DL (ref 70–110)
HCT VFR BLD AUTO: 24.8 % (ref 37–48.5)
HGB BLD-MCNC: 7.5 G/DL (ref 12–16)
IMM GRANULOCYTES # BLD AUTO: 0.04 K/UL (ref 0–0.04)
IMM GRANULOCYTES NFR BLD AUTO: 0.7 % (ref 0–0.5)
INR PPP: 1.6
LYMPHOCYTES # BLD AUTO: 0.8 K/UL (ref 1–4.8)
LYMPHOCYTES NFR BLD: 13.7 % (ref 18–48)
MAGNESIUM SERPL-MCNC: 1.9 MG/DL (ref 1.6–2.6)
MCH RBC QN AUTO: 26.9 PG (ref 27–31)
MCHC RBC AUTO-ENTMCNC: 30.2 G/DL (ref 32–36)
MCV RBC AUTO: 89 FL (ref 82–98)
MONOCYTES # BLD AUTO: 0.4 K/UL (ref 0.3–1)
MONOCYTES NFR BLD: 6.6 % (ref 4–15)
NEUTROPHILS # BLD AUTO: 4.2 K/UL (ref 1.8–7.7)
NEUTROPHILS NFR BLD: 76.9 % (ref 38–73)
NRBC BLD-RTO: 0 /100 WBC
NUM UNITS TRANS PACKED RBC: NORMAL
PHOSPHATE SERPL-MCNC: 4.7 MG/DL (ref 2.7–4.5)
PLATELET # BLD AUTO: 263 K/UL (ref 150–350)
PMV BLD AUTO: 10.4 FL (ref 9.2–12.9)
POTASSIUM SERPL-SCNC: 4.2 MMOL/L (ref 3.5–5.1)
PROTHROMBIN TIME: 18.2 SEC (ref 10.6–14.8)
RBC # BLD AUTO: 2.79 M/UL (ref 4–5.4)
SODIUM SERPL-SCNC: 135 MMOL/L (ref 136–145)
WBC # BLD AUTO: 5.48 K/UL (ref 3.9–12.7)

## 2020-11-09 PROCEDURE — 80048 BASIC METABOLIC PNL TOTAL CA: CPT

## 2020-11-09 PROCEDURE — 99900035 HC TECH TIME PER 15 MIN (STAT)

## 2020-11-09 PROCEDURE — 84100 ASSAY OF PHOSPHORUS: CPT

## 2020-11-09 PROCEDURE — 85025 COMPLETE CBC W/AUTO DIFF WBC: CPT

## 2020-11-09 PROCEDURE — 85610 PROTHROMBIN TIME: CPT

## 2020-11-09 PROCEDURE — P9016 RBC LEUKOCYTES REDUCED: HCPCS

## 2020-11-09 PROCEDURE — 36430 TRANSFUSION BLD/BLD COMPNT: CPT

## 2020-11-09 PROCEDURE — 90935 HEMODIALYSIS ONE EVALUATION: CPT

## 2020-11-09 PROCEDURE — 36415 COLL VENOUS BLD VENIPUNCTURE: CPT

## 2020-11-09 PROCEDURE — 12000002 HC ACUTE/MED SURGE SEMI-PRIVATE ROOM

## 2020-11-09 PROCEDURE — C9113 INJ PANTOPRAZOLE SODIUM, VIA: HCPCS | Performed by: FAMILY MEDICINE

## 2020-11-09 PROCEDURE — 97162 PT EVAL MOD COMPLEX 30 MIN: CPT

## 2020-11-09 PROCEDURE — 25000003 PHARM REV CODE 250: Performed by: FAMILY MEDICINE

## 2020-11-09 PROCEDURE — 63600175 PHARM REV CODE 636 W HCPCS: Mod: TB | Performed by: FAMILY MEDICINE

## 2020-11-09 PROCEDURE — 83735 ASSAY OF MAGNESIUM: CPT

## 2020-11-09 RX ORDER — DILTIAZEM HYDROCHLORIDE 240 MG/1
240 CAPSULE, EXTENDED RELEASE ORAL DAILY
Status: DISCONTINUED | OUTPATIENT
Start: 2020-11-10 | End: 2020-11-10 | Stop reason: HOSPADM

## 2020-11-09 RX ADMIN — CALCIUM ACETATE 667 MG: 667 CAPSULE ORAL at 02:11

## 2020-11-09 RX ADMIN — METOPROLOL SUCCINATE 25 MG: 25 TABLET, FILM COATED, EXTENDED RELEASE ORAL at 09:11

## 2020-11-09 RX ADMIN — CHLORHEXIDINE GLUCONATE 15 ML: 1.2 RINSE ORAL at 09:11

## 2020-11-09 RX ADMIN — PANTOPRAZOLE SODIUM 40 MG: 40 INJECTION, POWDER, FOR SOLUTION INTRAVENOUS at 09:11

## 2020-11-09 RX ADMIN — PANTOPRAZOLE SODIUM 40 MG: 40 INJECTION, POWDER, FOR SOLUTION INTRAVENOUS at 02:11

## 2020-11-09 RX ADMIN — PIPERACILLIN SODIUM AND TAZOBACTAM SODIUM 3.38 G: 3; .375 INJECTION, POWDER, LYOPHILIZED, FOR SOLUTION INTRAVENOUS at 02:11

## 2020-11-09 RX ADMIN — PIPERACILLIN SODIUM AND TAZOBACTAM SODIUM 3.38 G: 3; .375 INJECTION, POWDER, LYOPHILIZED, FOR SOLUTION INTRAVENOUS at 09:11

## 2020-11-09 RX ADMIN — DILTIAZEM HYDROCHLORIDE 180 MG: 180 CAPSULE, COATED, EXTENDED RELEASE ORAL at 02:11

## 2020-11-09 RX ADMIN — ISOSORBIDE MONONITRATE 30 MG: 30 TABLET, EXTENDED RELEASE ORAL at 02:11

## 2020-11-09 RX ADMIN — EPOETIN ALFA-EPBX 6100 UNITS: 10000 INJECTION, SOLUTION INTRAVENOUS; SUBCUTANEOUS at 10:11

## 2020-11-09 RX ADMIN — MAGNESIUM OXIDE 400 MG: 400 TABLET ORAL at 02:11

## 2020-11-09 NOTE — PROGRESS NOTES
Net uf 3000 ml  1 unit of prbc given during treatment.      11/09/20 1325   Post-Hemodialysis Assessment   Rinseback Volume (mL) 250 mL   Dialyzer Clearance Clear   Duration of Treatment (minutes) 180 minutes   Hemodialysis Intake (mL) 800 mL   Total UF (mL) 3800 mL   Net Fluid Removal 3000   Patient Response to Treatment tolerated well   Post-Treatment Weight 60 kg (132 lb 4.4 oz)   Treatment Weight Change -3   Arterial bleeding stop time (min) 10 min   Venous bleeding stop time (min) 10 min   Post-Hemodialysis Comments treatment complete, pt stable. no complications.

## 2020-11-09 NOTE — ASSESSMENT & PLAN NOTE
Vascular consult placed as patient does not have palpable pulses.     Patient appears to have an ingrown nail without signs of infection of the left great toenail.  I will order an x-ray to rule out an alternate cause for patient's pain.    Patient can follow up outpatient to have the nail removed.  This is not need to be done inpatient as there are no signs of infection.  However, patient will need vascular workup and likely intervention prior to any nail removal.  In order to have her nail removed she needs to have her blood flow optimized or the nail removal will not heal.  As there are no signs of infection, vascular workup and intervention can be done prior to nail removal.

## 2020-11-09 NOTE — PT/OT/SLP EVAL
"Physical Therapy Evaluation    Patient Name:  Griselda Neely   MRN:  [id number]    Recommendations:     Discharge Recommendations:  home   Discharge Equipment Recommendations: none   Barriers to discharge: None    Assessment:     Griselda Neely is a 75 y.o. female admitted with a medical diagnosis of Acute blood loss anemia.  She presents with the following impairments/functional limitations:  weakness, impaired endurance, impaired functional mobilty, gait instability, decreased safety awareness, impaired cardiopulmonary response to activity. Pt presented seated in chair and  was reluctant for PT eval stating "I can walk.  I don't need no help. I've been going to the bathroom by myself." Pt was encouraged to seek assistance for t/f to BR. Pt returned to bed with SBA but had severe SOB. She was transported to dialysis. She lives at home with family  and reported independence with ADL's and gait with  Quad cane.    Rehab Prognosis: Fair; patient would benefit from acute skilled PT services to address these deficits and reach maximum level of function.    Recent Surgery: * No surgery found *      Plan:     During this hospitalization, patient to be seen 5 x/week to address the identified rehab impairments via gait training, therapeutic activities and progress toward the following goals:    · Plan of Care Expires:       Subjective     Chief Complaint: ARGUELLO  Patient/Family Comments/goals: home with family  Pain/Comfort:  ·      Patients cultural, spiritual, Church conflicts given the current situation:      Living Environment:  Pt lives with her children in a one story house with ramp entrance.  Prior to admission, patients level of function was independent.  Equipment used at home: walker, rolling, wheelchair, 3-in-1 commode, hospital bed.  DME owned (not currently used): none.  Upon discharge, patient will have assistance from family.    Objective:     Communicated with nurse prior to session.  Patient found up in " chair with    upon PT entry to room.    General Precautions: Standard, fall, aspiration   Orthopedic Precautions:    Braces:       Exams:  · Cognitive Exam:  Patient is oriented to Person, Place, Time and Situation  · RUE Strength: WFL  · LUE Strength: WFL  · RLE Strength: WFL  · LLE Strength: WFL    Functional Mobility:  · Bed Mobility:     · Sit to Supine: stand by assistance  · Transfers:     · Sit to Stand:  stand by assistance with no AD  · Balance: good sitting balance,  fair standing balance    Therapeutic Activities and Exercises:  Pt was educated on the need for PT eval. She gave reasons for not needing PT.  Therapist explained that her doctor needed documentation of her functional level to which pt responded that she was about to go to dialysis.  Pt stood at  chair with SBA and returned to supine with SBA with severe SOB on 4 L 02.  Did not assess gait.    AM-PAC 6 CLICK MOBILITY  Total Score:15     Patient left supine with 02 in place as pt was t/f'ed to dialysis.    GOALS:   Multidisciplinary Problems     Physical Therapy Goals        Problem: Physical Therapy Goal    Goal Priority Disciplines Outcome Goal Variances Interventions   Physical Therapy Goal     PT, PT/OT      Description: Goals to be met by:D/C    Patient will increase functional independence with mobility by performin. Gait  x 20  feet with Stand-by Assistance using Rolling Walker.                      History:     Past Medical History:   Diagnosis Date    Anemia     Atrial fibrillation     Calciphylaxis 2017    both legs    CHF (congestive heart failure)     Encounter for blood transfusion 2016    Gout     Hemodialysis access, AV graft     Hypertension     Mitral valve regurgitation     Osteoarthritis     Pancreatitis     Peripheral vascular disease     Pneumonia 2017    Renal failure        Past Surgical History:   Procedure Laterality Date    ACHILLES TENDON SURGERY Right     ANGIOGRAPHY OF  ARTERIOVENOUS SHUNT Right 1/7/2020    Procedure: Fistulogram with Possible Intervention;  Surgeon: Joseph Loyola MD;  Location: [facility] CATH/EP LAB;  Service: Cardiology;  Laterality: Right;    ANGIOGRAPHY OF LOWER EXTREMITY Left 3/18/2020    Procedure: Angiogram Extremity Unilateral;  Surgeon: Ali Khoobehi, MD;  Location: [facility] CATH/EP LAB;  Service: Cardiology;  Laterality: Left;    APPENDECTOMY      CARDIAC CATHETERIZATION  07/03/2017    CHOLECYSTECTOMY      COLONOSCOPY Left 10/4/2020    Procedure: COLONOSCOPY;  Surgeon: Jordan Kilpatrick MD;  Location: [facility] ENDO;  Service: Endoscopy;  Laterality: Left;    ESOPHAGOGASTRODUODENOSCOPY Left 8/17/2020    Procedure: EGD (ESOPHAGOGASTRODUODENOSCOPY);  Surgeon: Talat Trevizo MD;  Location: [facility] ENDO;  Service: Endoscopy;  Laterality: Left;    ESOPHAGOGASTRODUODENOSCOPY Left 10/2/2020    Procedure: EGD (ESOPHAGOGASTRODUODENOSCOPY);  Surgeon: Talat Trevizo MD;  Location: [facility] ENDO;  Service: Endoscopy;  Laterality: Left;    heal surgery Right     HYSTERECTOMY      INSERTION OF STENT INTO PERIPHERAL VESSEL N/A 1/7/2020    Procedure: INSERTION, STENT, VESSEL, PERIPHERAL;  Surgeon: Joseph Loyola MD;  Location: [facility] CATH/EP LAB;  Service: Cardiology;  Laterality: N/A;    MITRAL VALVE REPLACEMENT      PARATHYROIDECTOMY      PARATHYROIDECTOMY  07/13/2017    PERCUTANEOUS TRANSLUMINAL ANGIOPLASTY OF ARTERIOVENOUS FISTULA N/A 1/7/2020    Procedure: PTA, AV FISTULA;  Surgeon: Joseph Loyola MD;  Location: [facility] CATH/EP LAB;  Service: Cardiology;  Laterality: N/A;    PERITONEAL CATHETER INSERTION      RENAL BIOPSY      vocal cord nodule      WOUND DEBRIDEMENT Left 7/13/2020    Procedure: DEBRIDEMENT, WOUND;  Surgeon: Carlos Elam III, MD;  Location: [facility] OR;  Service: General;  Laterality: Left;       Time Tracking:     PT Received On: 11/09/20  PT Start Time: 1005     PT Stop Time: 1011  PT Total Time (min): 6 min     Billable Minutes: Evaluation 6  mirian Gilliland, PT  11/09/2020

## 2020-11-09 NOTE — PT/OT/SLP PROGRESS
Occupational Therapy      Patient Name:  Griselda Neely   MRN:  [id number]    Patient not seen today x 2 attempts secondary to Other (Comment)(Pt refused on 1st attempt; Pt off unit at dialysis on 2nd attempt.). Will follow-up next service date.    Nicole Self OT  11/9/2020

## 2020-11-09 NOTE — PROGRESS NOTES
Former[facility]  Nephrology  Progress Note    Patient Name: Griselda Neely  MRN: [id number]  Admission Date: 11/7/2020  Hospital Length of Stay: 2 days  Attending Provider: Darlene Lin MD   Primary Care Physician: Kalie Neely MD  Principal Problem:Acute blood loss anemia    Consults  Subjective:     Interval History:   Sitting up in bed; alert and oriented.  HD with blood transfusion in progress today.  Tolerating without complaints  Wants to go home  No acute events overnight    Review of patient's allergies indicates:  No Known Allergies  Current Facility-Administered Medications   Medication Frequency    0.9%  NaCl infusion (for blood administration) Q24H PRN    acetaminophen tablet 650 mg Q8H PRN    albuterol-ipratropium 2.5 mg-0.5 mg/3 mL nebulizer solution 3 mL Q4H PRN    calcium acetate(phosphat bind) capsule 667 mg Daily    chlorhexidine 0.12 % solution 15 mL BID    dextrose 50% injection 12.5 g PRN    dextrose 50% injection 25 g PRN    diltiaZEM 24 hr capsule 180 mg Daily    epoetin alfonzo-epbx injection 6,100 Units Every Mon, Wed, Fri    glucagon (human recombinant) injection 1 mg PRN    glucose chewable tablet 16 g PRN    glucose chewable tablet 24 g PRN    hydrALAZINE tablet 10 mg Q8H PRN    isosorbide mononitrate 24 hr tablet 30 mg Daily    magnesium oxide tablet 400 mg Daily    melatonin tablet 6 mg Nightly PRN    metoprolol injection 5 mg Q5 Min PRN    metoprolol succinate (TOPROL-XL) 24 hr tablet 25 mg Nightly    morphine injection 4 mg Q4H PRN    ondansetron injection 4 mg Q8H PRN    pantoprazole injection 40 mg BID    piperacillin-tazobactam 3.375 g in dextrose 5 % 50 mL IVPB (ready to mix system) Q12H    polyethylene glycol packet 17 g BID PRN    prochlorperazine injection Soln 5 mg Q6H PRN    sodium chloride 0.9% flush 10 mL PRN    sodium thiosulfate 12.5 gram/50 mL (250 mg/mL) injection 25 g Every Mon, Wed, Fri       Objective:     Vital Signs (Most  Recent):  Temp: 97 °F (36.1 °C) (11/09/20 1325)  Pulse: 85 (11/09/20 1325)  Resp: 18 (11/09/20 1325)  BP: (!) 172/103 (11/09/20 1325)  SpO2: 97 % (11/09/20 0701)  O2 Device (Oxygen Therapy): nasal cannula (11/09/20 1325) Vital Signs (24h Range):  Temp:  [96.8 °F (36 °C)-98.3 °F (36.8 °C)] 97 °F (36.1 °C)  Pulse:  [] 85  Resp:  [17-18] 18  SpO2:  [97 %-100 %] 97 %  BP: (131-209)/() 172/103     Weight: 61.6 kg (135 lb 12.9 oz) (11/09/20 0400)  Body mass index is 22.6 kg/m².  Body surface area is 1.68 meters squared.    I/O last 3 completed shifts:  In: 965 [P.O.:465; Other:500]  Out: 507 [Other:507]    Physical Exam     Physical Exam  Vitals signs and nursing note reviewed.   Constitutional:       General: She is not in acute distress.     Appearance: Normal appearance.   HENT:      Head: Normocephalic and atraumatic.      Nose: Nose normal.      Mouth/Throat:      Mouth: Mucous membranes are moist.   Eyes:      Extraocular Movements: Extraocular movements intact.      Conjunctiva/sclera: Conjunctivae normal.      Pupils: Pupils are equal, round, and reactive to light.   Neck:      Musculoskeletal: Normal range of motion and neck supple.      Comments: No JVD  Cardiovascular:      Rate and Rhythm: Normal rate and irregular rhythm    Pulmonary:      Effort: Pulmonary effort is normal.      Breath sounds: Normal breath sounds.      Comments: lungs sounds clear  Abdominal:      General: Bowel sounds are normal. There is no distension.      Palpations: Abdomen is soft.      Tenderness: There is no abdominal tenderness.   Musculoskeletal:      Right lower leg: No edema.      Left lower leg: No edema.   Skin:     General: Skin is warm and dry.   Neurological:      Mental Status: She is alert and oriented to person, place, and time.   Psychiatric:         Mood and Affect: Mood normal.         Behavior: Behavior normal.        Significant Labs:sure  CBC:   Recent Labs   Lab 11/09/20  1020   WBC 5.48   RBC 2.79*    HGB 7.5*   HCT 24.8*      MCV 89   MCH 26.9*   MCHC 30.2*     CMP:   Recent Labs   Lab 11/07/20  1242 11/09/20  1020    124*   CALCIUM 8.2* 7.9*   ALBUMIN 2.6*  --    PROT 7.1  --     135*   K 4.5 4.2   CO2 28 20*   CL 98 100   BUN 38* 41*   CREATININE 5.2* 6.2*   ALKPHOS 93  --    ALT 10  --    AST 14  --    BILITOT 0.9  --      All labs within the past 24 hours have been reviewed.    Significant Imaging:      Assessment/Plan:     Active Diagnoses:    Diagnosis Date Noted POA    PRINCIPAL PROBLEM:  Acute blood loss anemia [D62] 10/01/2020 Unknown    Chronic toe pain, left foot [M79.675, G89.29] 11/09/2020 Yes    Rectal bleeding [K62.5] 11/07/2020 Yes    Chronic systolic heart failure [I50.22] 02/03/2020 Yes    DNR (do not resuscitate) [Z66] 09/30/2019 Yes     Chronic    Anemia due to chronic kidney disease [N18.9, D63.1] 07/25/2017 Yes    Pulmonary hypertension [I27.20] 07/25/2017 Yes     Chronic    Other persistent atrial fibrillation [I48.19] 07/18/2017 Yes    ESRD (end stage renal disease) on HD M,W, F [N18.6] 05/11/2016 Yes     Chronic    Gastroesophageal reflux disease [K21.9] 02/02/2016 Yes    HTN (hypertension) [I10] 08/15/2013 Yes     Chronic      Problems Resolved During this Admission:     Assessment and plan     GI bleed:  -PRBC with dilaysis today; net UF 3L  -recurrent rectal bleeding  -recent colonoscopy revealing diverticulosis and angiodysplasia; plan for outpatient  capsule study  -GI following; suspicion is for bleeding of other angiodysplasia lesions      HTN:  -above goal  -continue home meds  -HM following     ESRD:  -HD with PRBC today; resume M-W-F schedule  -renal chemistries stable  -strict I&O, daily weight, and avoid nephrotoxins (GFR appropriate)  -await a.m. chemistries    Hyponatremia:  -mild  -titrate HD bath  -monitor closely\    NAGMA:  -titrate HD  -monitor closely      Calciphylaxis:  -Sodium thiosulfate  -no lesions noted     Anemia, chronic  disease:  -below goal  -getting DONNY with HD  -PRBC today  -monitor closely     Renal BMD:  -Ca corrects for hypoalbuminemia  -phos slightly elevated  -continue binders    Gabe Barksdale NP  Nephrology  [facility]

## 2020-11-09 NOTE — CONSULTS
HPI: 74 yo AAF with PMH as noted below presenting with rectal bleeding.  She was discharged Friday PM after recovering from sepsis due to UTI; bleeding started on day of discharge. On admit, her INR is 2.2, but her hgb and HCt have decreased from just 2 days prior.   Consult for ESRD          Past Medical History:   Diagnosis Date    Anemia      Atrial fibrillation      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     both legs    CHF (congestive heart failure)      Encounter for blood transfusion 03/2016    Gout      Hemodialysis access, AV graft      Hypertension      Mitral valve regurgitation      Osteoarthritis      Pancreatitis      Peripheral vascular disease      Pneumonia 09/09/2017    Renal failure                 Past Surgical History:   Procedure Laterality Date    ACHILLES TENDON SURGERY Right      ANGIOGRAPHY OF ARTERIOVENOUS SHUNT Right 1/7/2020     Procedure: Fistulogram with Possible Intervention;  Surgeon: Joseph oLyola MD;  Location: [facility] CATH/EP LAB;  Service: Cardiology;  Laterality: Right;    ANGIOGRAPHY OF LOWER EXTREMITY Left 3/18/2020     Procedure: Angiogram Extremity Unilateral;  Surgeon: Ali Khoobehi, MD;  Location: [facility] CATH/EP LAB;  Service: Cardiology;  Laterality: Left;    APPENDECTOMY        CARDIAC CATHETERIZATION   07/03/2017    CHOLECYSTECTOMY        COLONOSCOPY Left 10/4/2020     Procedure: COLONOSCOPY;  Surgeon: Jordan Kilpatrick MD;  Location: [facility] ENDO;  Service: Endoscopy;  Laterality: Left;    ESOPHAGOGASTRODUODENOSCOPY Left 8/17/2020     Procedure: EGD (ESOPHAGOGASTRODUODENOSCOPY);  Surgeon: Talat Trevizo MD;  Location: [facility] ENDO;  Service: Endoscopy;  Laterality: Left;    ESOPHAGOGASTRODUODENOSCOPY Left 10/2/2020     Procedure: EGD (ESOPHAGOGASTRODUODENOSCOPY);  Surgeon: Talat Trevizo MD;  Location: [facility];  Service: Endoscopy;  Laterality: Left;    heal surgery Right      HYSTERECTOMY        INSERTION OF STENT INTO PERIPHERAL VESSEL N/A 1/7/2020      Procedure: INSERTION, STENT, VESSEL, PERIPHERAL;  Surgeon: Joseph Loyola MD;  Location: [facility] CATH/EP LAB;  Service: Cardiology;  Laterality: N/A;    MITRAL VALVE REPLACEMENT        PARATHYROIDECTOMY        PARATHYROIDECTOMY   07/13/2017    PERCUTANEOUS TRANSLUMINAL ANGIOPLASTY OF ARTERIOVENOUS FISTULA N/A 1/7/2020     Procedure: PTA, AV FISTULA;  Surgeon: Joseph Loyola MD;  Location: [facility] CATH/EP LAB;  Service: Cardiology;  Laterality: N/A;    PERITONEAL CATHETER INSERTION        RENAL BIOPSY        vocal cord nodule        WOUND DEBRIDEMENT Left 7/13/2020     Procedure: DEBRIDEMENT, WOUND;  Surgeon: Carlos Elam III, MD;  Location: [facility] OR;  Service: General;  Laterality: Left;         Review of patient's allergies indicates:  No Known Allergies     No current facility-administered medications on file prior to encounter.            Current Outpatient Medications on File Prior to Encounter   Medication Sig    amoxicillin-clavulanate 500-125mg (AUGMENTIN) 500-125 mg Tab Take 1 tablet (500 mg total) by mouth every evening. Take AFTER DIALYSIS on dialysis days. for 7 days    aspirin (ECOTRIN) 81 MG EC tablet Take 1 tablet (81 mg total) by mouth once daily.    calcium acetate (PHOSLO) 667 mg capsule Take 667 mg by mouth once daily.     diltiaZEM (CARDIZEM CD) 180 MG 24 hr capsule Take 1 capsule by mouth once daily (Patient taking differently: Take 180 mg by mouth once daily. )    folic acid/vit B complex and C (JENNIFER-KODAK ORAL) Take 1 tablet by mouth once daily.    isosorbide mononitrate (IMDUR) 30 MG 24 hr tablet Take 30 mg by mouth once daily.    losartan (COZAAR) 25 MG tablet Take 25 mg by mouth once daily.    magnesium oxide (MAG-OX) 400 mg (241.3 mg magnesium) tablet Take 1 tablet by mouth once daily (Patient taking differently: Take 400 mg by mouth once daily. )    metoprolol succinate (TOPROL-XL) 25 MG 24 hr tablet Take 1 tablet (25 mg total) by mouth nightly.    ondansetron  (ZOFRAN-ODT) 4 MG TbDL Take 4 mg by mouth every 6 (six) hours as needed.    oxyCODONE-acetaminophen (PERCOCET) 5-325 mg per tablet Take 1 tablet by mouth every 6 (six) hours as needed for Pain.    pantoprazole (PROTONIX) 40 MG tablet Take 1 tablet (40 mg total) by mouth once daily.    warfarin (COUMADIN) 2.5 MG tablet Take 2.5 mg by mouth once daily.    zinc sulfate 220 mg Tab tablet Take 220 mg by mouth every other day.    clindamycin (CLEOCIN) 300 MG capsule Take 300 mg by mouth 3 (three) times daily.     enoxaparin (LOVENOX) 60 mg/0.6 mL Syrg Inject 60 mg into the skin once daily.     wheelchair An 1 Device by Misc.(Non-Drug; Combo Route) route as needed (needed for transport).    [DISCONTINUED] HYDROcodone-acetaminophen (NORCO) 7.5-325 mg per tablet hydrocodone 7.5 mg-acetaminophen 325 mg tablet   TAKE ONE TABLET BY MOUTH PRIOR TO WOUND DEBRIDEMENT. REPEAT DOSE IN 8 HOURS POST DEBRIDEMENT.    [DISCONTINUED] metoprolol tartrate (LOPRESSOR) 25 MG tablet metoprolol tartrate 25 mg tablet   TAKE 1 TABLET BY MOUTH EVERY 12 HOURS    [DISCONTINUED] naloxone (NARCAN) 4 mg/actuation Spry Narcan 4 mg/actuation nasal spray    [DISCONTINUED] traMADoL (ULTRAM) 50 mg tablet tramadol 50 mg tablet    [DISCONTINUED] vit b cmplx 3-fa-vit c-biotin 1- mg-mg-mcg (JENNIFER-KODAK RX) 1- mg-mg-mcg Tab Jennifer-Kodak Rx 1 mg-60 mg-300 mcg tablet   TAKE 1 TABLET BY MOUTH ONCE DAILY           Family History      Problem Relation (Age of Onset)     Arthritis Mother     Diabetes Mother, Father     Early death Sister, Sister     Heart disease Sister, Maternal Grandfather, Mother     Heart failure Mother     Hypertension Mother          Tobacco Use    Smoking status: Current Some Day Smoker       Packs/day: 0.50       Years: 45.00       Pack years: 22.50       Types: Cigarettes    Smokeless tobacco: Never Used   Substance and Sexual Activity    Alcohol use: No    Drug use: No    Sexual activity: Not Currently      Review  of Systems   +bleeding as noted above  +chronic fatigue  +L thigh wound (calciphylaxis) - stable  +stable ARGUELLO  +dry cough  +chest pressure with SOB on admit, resolved after HD  +nausea, decreased appetite.    -f/csweats  -angina, orthopnea  -other bleeding  -wheezing, hemoptysis  -other skin lesions        PE:  AA  NAD  Upright no SOB  No JVD  Stable B rales; no overt crackles  RRR, no rub  + LE edema  L thigh dressing c/d/i  View Graph    11/07 0700 11/08 0659 11/08 0700 11/08 2321  Most Recent     Temp (°F) 97.1-98.4 97.3-98.3  98.3 (36.8)  11/08 1935   Pulse  70-80  75  11/08 2043   Resp 18-20 17-19  18  11/08 2030   //79 135//83  165/70   11/08 2043   MAP (mmHg)          SpO2 (%)    99  11/08 2030   Weight (kg) 60.8         Intake/Output     Report  View Table   [id number]  507  500  115  --  +108  --  615  (10.1)  507  --  1 x  7 x  P.O.  Other  In  Out  Other  Net  Stool Occurrence            LABS:      11/06 0600 11/07 1242 11/07 2030   Sodium 134Low      141     --        134Low      --     --       Potassium 4.2     4.5     --        4.2     --     --       Chloride 95     98     --        95     --     --       CO2 23     28     --        23     --     --       BUN 45High      38High      --        45High      --     --       Creatinine 7.0High      5.2High      --        7.0High      --     --       Glucose 66Low      103     --        66Low      --     --       Magnesium 1.9     --     --       WBC 8.87     6.12     --       Hemoglobin --     7.1Low      8.1Low        Hematocrit --     24.2Low      26.9Low        Platelets 292     304     --       INR --     2.2     --       Group & Rh --     B POS     --       Phosphorus 4.7High      --     --        4.7High      --     --       Troponin I --     --     0.0                A/P:  A/w GIB, anemia. S/p PRBCs on HD on admit. Appears otherwise at baseline on admit.      GIB  GI following  S/p  PRBCs      ESRD  S/p HD yesterday  Will return to MWF schedule after today      HTN  Home regimen  F/u with additional UF      Anemia, chronic disease  DONNY with HD      COPD  Stable      Sec HPT  Binders with meals

## 2020-11-09 NOTE — PROGRESS NOTES
[facility]  Progress Note    Patient Name: Griselda Neely  MRN: [id number]  Patient Class: IP- Inpatient   Admission Date: 11/7/2020 11:50 AM  Attending Physician: Darlene Lin MD  Primary Care Provider: Kalie Neely MD  Face-to-Face encounter date: 11/09/2020    Assessment & Plan:   Griselda Neely is a 75 y.o. female with:    Acute Blood Loss Anemia  Due to GI bleed  Complicated by anemia of chronic disease  Chronically anticoagulated due to a-fib with atrial thrombus  - trending H&H; worsening anemia despite pRBC transfusion  - another pRBC transfusion today  - PPI   - holding home coumadin  - daily INR  - echo reviewed  - no cardiac stress procedure or cath to be done per cardiology  - however pt still requires coagulation per their report  - will restart coagulation when anemia has stablized  - GI and cardiology following, thank you     ESRD  - HD today  - nephrology following, thank you     Toe pain / Ingrown toenail  Severe PAD  - podiatry recommends vascular evaluation  - vascular consulted, thank you     Recently discharged for sepsis  Due to UTI vs PNA  - continue zosyn    HTN  - BP elevated today  - now undergoing HD  - adjusting medication prn    Chronic respiratory failure  - continue home oxygen supplemental at 3L NC     Other chronic conditions: home meds as appropriate  Electrolyte derangement:  Replacement prn  VTE ppx: SCDs, actively bleeding  DNR    Discharge Planning:     Home at discharge with  PT/OT.    Subjective:      11/8:  Pt upset because she has been NPO for a possible GI procedure.  She wants to leave AMA.  As it is now afternoon, I am allowing her to eat some food to prevent her from leaving.  NPO midnight.  No CP, no palpitations, no dizziness.  Chronic SOB on supplemental oxygen.    11/9:  Pt continues to be upset that she requires more pRBC today.  She denies any bleeding or abdominal pain, but she has a habit of not being forthcoming  "because she wants to go home.  We discussed hospice options, which she and her family currently refuse.  Hopefully she can be discharged tomorrow.    Review of Systems   All systems reviewed and are negative except as noted per above.  Objective:     Physical Exam  BP (!) (P) 166/82   Pulse (P) 87   Temp 96.8 °F (36 °C) (Tympanic)   Resp 18   Ht 5' 5" (1.651 m)   Wt 61.6 kg (135 lb 12.9 oz)   LMP  (LMP Unknown)   SpO2 97%   BMI 22.60 kg/m²     Gen: alert, responsive, FRAIL   HEENT:  Eyes - no pallor  External ears with no lesions  Nares patent  Mouth - lips chapped  CV: iRRR  Lungs: COARSE, ON SUPPLEMENTAL OXYGEN  Abd: +BS, soft, NT, ND  Ext: +CHRONIC atrophy; NO edema  Skin: warm, dry; L TOE WITH INGROWN TOENAIL  Neuro: grossly intact  Psych: pleasant    Recent Labs   Lab 11/09/20  1020   WBC 5.48   HGB 7.5*   HCT 24.8*        Recent Labs   Lab 11/09/20  1020   CALCIUM 7.9*   *   K 4.2   CO2 20*        No results found for: POCTGLUCOSE   Microbiology Results (last 7 days)     ** No results found for the last 168 hours. **        X-Ray Chest AP Portable   Final Result      X-Ray Chest AP Portable   Final Result      Development of small right pleural effusion.      Cardiomegaly with evidence of pulmonary vascular congestion.         Electronically signed by: Harish Liu MD   Date:    11/07/2020   Time:    12:59          All labs, images, and other studies - including those not included in this note -- were reviewed personally by me.    Inpatient medications  Scheduled Meds:   calcium acetate(phosphat bind)  667 mg Oral Daily    chlorhexidine  15 mL Mouth/Throat BID    diltiaZEM  180 mg Oral Daily    epoetin alfonzo-epbx  100 Units/kg Subcutaneous Every Mon, Wed, Fri    isosorbide mononitrate  30 mg Oral Daily    magnesium oxide  400 mg Oral Daily    metoprolol succinate  25 mg Oral Nightly    pantoprazole  40 mg Intravenous BID    piperacillin-tazobactam (ZOSYN) IVPB  3.375 g " Intravenous Q12H    sodium thiosulfate  25 g Intravenous Every Mon, Wed, Fri     Continuous Infusions:  PRN Meds:.sodium chloride, acetaminophen, albuterol-ipratropium, dextrose 50%, dextrose 50%, glucagon (human recombinant), glucose, glucose, hydrALAZINE, melatonin, metoprolol, morphine, ondansetron, polyethylene glycol, prochlorperazine, sodium chloride 0.9%    Above encounter included review of the medical records, interviewing and examining the patient face-to-face, discussion with family and other health care providers, ordering and interpreting lab/test results and formulating a plan of care.     Darlene Lin MD  [facility] Hospitalist

## 2020-11-09 NOTE — HPI
75-year-old female with a history of multiple medical problems including atrial fibrillation on Coumadin, CHF, chronic kidney disease on hemodialysis,  peripheral vascular disease who I was consulted on to assess patient's pain in her left great toe.  Patient states that she has had chronic pain to the left great toenail.  She states it hurts when pressure is applied the nail bed and around the nail itself.  No drainage or open wound present.  No signs of infection to the nail bed.

## 2020-11-09 NOTE — CONSULTS
Patient state her leg is healed and she is not having any treatment or dressing.  She does not want anything done to left leg.

## 2020-11-09 NOTE — CONSULTS
[facility]  Vascular Surgery  Consult Note    Inpatient consult to Vascular Surgery  Consult performed by: Ali Khoobehi, MD  Consult ordered by: Lucero Raymond DPM        Subjective:     Chief Complaint/Reason for Admission: PVD    History of Present Illness:   Patient is a 75-year-old woman with history of end-stage renal disease, calciphylaxis, known to our practice, who has an ingrown left hallux toenail.  We are consult by Podiatry for vascular evaluation.  Prior ultrasound from March demonstrates occlusions in the left posterior tibial and peroneal artery and poor waveforms in the anterior tibial artery.  I subsequently performed an angiogram of her left leg in March which shows that she has severe infra malleolar disease in the left foot and a diseased the pain anterior tibial artery.  No intervention was feasible at the time.    Medications Prior to Admission   Medication Sig Dispense Refill Last Dose    amoxicillin-clavulanate 500-125mg (AUGMENTIN) 500-125 mg Tab Take 1 tablet (500 mg total) by mouth every evening. Take AFTER DIALYSIS on dialysis days. for 7 days 7 tablet 0 11/6/2020 at Unknown time    aspirin (ECOTRIN) 81 MG EC tablet Take 1 tablet (81 mg total) by mouth once daily. 30 tablet 0 11/7/2020 at 05:00    calcium acetate (PHOSLO) 667 mg capsule Take 667 mg by mouth once daily.    11/7/2020 at 05:00    diltiaZEM (CARDIZEM CD) 180 MG 24 hr capsule Take 1 capsule by mouth once daily (Patient taking differently: Take 180 mg by mouth once daily. ) 90 capsule 0 11/7/2020 at 05:00    folic acid/vit B complex and C (JENNIFER-KODAK ORAL) Take 1 tablet by mouth once daily.   11/7/2020 at 05:00    isosorbide mononitrate (IMDUR) 30 MG 24 hr tablet Take 30 mg by mouth once daily.   11/7/2020 at 05:00    losartan (COZAAR) 25 MG tablet Take 25 mg by mouth once daily.   11/7/2020 at 05:00    magnesium oxide (MAG-OX) 400 mg (241.3 mg magnesium) tablet Take 1 tablet by mouth once daily (Patient  taking differently: Take 400 mg by mouth once daily. ) 30 tablet 0 11/7/2020 at 05:00    metoprolol succinate (TOPROL-XL) 25 MG 24 hr tablet Take 1 tablet (25 mg total) by mouth nightly. 30 tablet 0 11/6/2020 at Unknown time    ondansetron (ZOFRAN-ODT) 4 MG TbDL Take 4 mg by mouth every 6 (six) hours as needed.   Past Week at Unknown time    oxyCODONE-acetaminophen (PERCOCET) 5-325 mg per tablet Take 1 tablet by mouth every 6 (six) hours as needed for Pain.   Past Month at Unknown time    pantoprazole (PROTONIX) 40 MG tablet Take 1 tablet (40 mg total) by mouth once daily. 90 tablet 0 11/7/2020 at 05:00    warfarin (COUMADIN) 2.5 MG tablet Take 2.5 mg by mouth once daily.   11/7/2020 at 05:00    zinc sulfate 220 mg Tab tablet Take 220 mg by mouth every other day.   11/7/2020 at 05:00    clindamycin (CLEOCIN) 300 MG capsule Take 300 mg by mouth 3 (three) times daily.    Unknown at Unknown time    enoxaparin (LOVENOX) 60 mg/0.6 mL Syrg Inject 60 mg into the skin once daily.    Unknown at Unknown time    wheelchair An 1 Device by Misc.(Non-Drug; Combo Route) route as needed (needed for transport).  0 Unknown at Unknown time       Review of patient's allergies indicates:  No Known Allergies    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    both legs    CHF (congestive heart failure)     Encounter for blood transfusion 03/2016    Gout     Hemodialysis access, AV graft     Hypertension     Mitral valve regurgitation     Osteoarthritis     Pancreatitis     Peripheral vascular disease     Pneumonia 09/09/2017    Renal failure      Past Surgical History:   Procedure Laterality Date    ACHILLES TENDON SURGERY Right     ANGIOGRAPHY OF ARTERIOVENOUS SHUNT Right 1/7/2020    Procedure: Fistulogram with Possible Intervention;  Surgeon: Joseph Loyola MD;  Location: [facility] CATH/EP LAB;  Service: Cardiology;  Laterality: Right;    ANGIOGRAPHY OF LOWER  EXTREMITY Left 3/18/2020    Procedure: Angiogram Extremity Unilateral;  Surgeon: Ali Khoobehi, MD;  Location: [facility] CATH/EP LAB;  Service: Cardiology;  Laterality: Left;    APPENDECTOMY      CARDIAC CATHETERIZATION  07/03/2017    CHOLECYSTECTOMY      COLONOSCOPY Left 10/4/2020    Procedure: COLONOSCOPY;  Surgeon: Jordan Kilpatrick MD;  Location: [facility] ENDO;  Service: Endoscopy;  Laterality: Left;    ESOPHAGOGASTRODUODENOSCOPY Left 8/17/2020    Procedure: EGD (ESOPHAGOGASTRODUODENOSCOPY);  Surgeon: Talat Trevizo MD;  Location: [facility] ENDO;  Service: Endoscopy;  Laterality: Left;    ESOPHAGOGASTRODUODENOSCOPY Left 10/2/2020    Procedure: EGD (ESOPHAGOGASTRODUODENOSCOPY);  Surgeon: Talat Trevizo MD;  Location: [facility] ENDO;  Service: Endoscopy;  Laterality: Left;    heal surgery Right     HYSTERECTOMY      INSERTION OF STENT INTO PERIPHERAL VESSEL N/A 1/7/2020    Procedure: INSERTION, STENT, VESSEL, PERIPHERAL;  Surgeon: Joseph Loyola MD;  Location: [facility] CATH/EP LAB;  Service: Cardiology;  Laterality: N/A;    MITRAL VALVE REPLACEMENT      PARATHYROIDECTOMY      PARATHYROIDECTOMY  07/13/2017    PERCUTANEOUS TRANSLUMINAL ANGIOPLASTY OF ARTERIOVENOUS FISTULA N/A 1/7/2020    Procedure: PTA, AV FISTULA;  Surgeon: Joseph Loyola MD;  Location: [facility] CATH/EP LAB;  Service: Cardiology;  Laterality: N/A;    PERITONEAL CATHETER INSERTION      RENAL BIOPSY      vocal cord nodule      WOUND DEBRIDEMENT Left 7/13/2020    Procedure: DEBRIDEMENT, WOUND;  Surgeon: Carlos Elam III, MD;  Location: [facility] OR;  Service: General;  Laterality: Left;     Family History     Problem Relation (Age of Onset)    Arthritis Mother    Diabetes Mother, Father    Early death Sister, Sister    Heart disease Sister, Maternal Grandfather, Mother    Heart failure Mother    Hypertension Mother        Tobacco Use    Smoking status: Current Some Day Smoker     Packs/day: 0.50     Years: 45.00     Pack years: 22.50     Types:  Cigarettes    Smokeless tobacco: Never Used   Substance and Sexual Activity    Alcohol use: No    Drug use: No    Sexual activity: Not Currently     Review of Systems   Constitutional: Negative for chills and fever.   Respiratory: Negative for chest tightness.    Cardiovascular: Positive for leg swelling. Negative for chest pain.   Gastrointestinal: Negative for abdominal pain.   All other systems reviewed and are negative.    Objective:     Vital Signs (Most Recent):  Temp: 97 °F (36.1 °C) (11/09/20 1325)  Pulse: 85 (11/09/20 1325)  Resp: 18 (11/09/20 1325)  BP: (!) 172/103 (11/09/20 1325)  SpO2: 97 % (11/09/20 0701) Vital Signs (24h Range):  Temp:  [96.8 °F (36 °C)-98.3 °F (36.8 °C)] 97 °F (36.1 °C)  Pulse:  [] 85  Resp:  [17-18] 18  SpO2:  [97 %-100 %] 97 %  BP: (131-209)/() 172/103     Weight: 61.6 kg (135 lb 12.9 oz)  Body mass index is 22.6 kg/m².    Date 11/09/20 0700 - 11/10/20 0659   Shift 5671-5036 6059-8070 4837-1343 24 Hour Total   INTAKE   Blood 626   626   Other 800   800   Shift Total(mL/kg) 1426(23.1)   1426(23.1)   OUTPUT   Other 3800   3800   Shift Total(mL/kg) 3800(61.7)   3800(61.7)   Weight (kg) 61.6 61.6 61.6 61.6       Physical Exam  Vitals signs and nursing note reviewed.   Constitutional:       Appearance: Normal appearance.   HENT:      Head: Normocephalic and atraumatic.      Mouth/Throat:      Mouth: Mucous membranes are moist.   Neck:      Musculoskeletal: Neck supple.      Vascular: No carotid bruit.   Cardiovascular:      Rate and Rhythm: Normal rate and regular rhythm.      Pulses:           Femoral pulses are 2+ on the right side and 2+ on the left side.       Dorsalis pedis pulses are detected w/ Doppler on the right side and detected w/ Doppler on the left side.        Posterior tibial pulses are detected w/ Doppler on the right side and detected w/ Doppler on the left side.      Comments: Left hallux tenderness  Pulmonary:      Effort: Pulmonary effort is normal.       Breath sounds: Normal breath sounds.   Abdominal:      General: Abdomen is flat.      Palpations: Abdomen is soft.      Tenderness: There is no abdominal tenderness.   Neurological:      Mental Status: She is alert.         Significant Labs:  CBC:   Recent Labs   Lab 11/09/20  1020   WBC 5.48   RBC 2.79*   HGB 7.5*   HCT 24.8*      MCV 89   MCH 26.9*   MCHC 30.2*     CMP:   Recent Labs   Lab 11/09/20  1020   *   CALCIUM 7.9*   *   K 4.2   CO2 20*      BUN 41*   CREATININE 6.2*     Coagulation:   Recent Labs   Lab 11/09/20  1020   INR 1.6       Significant Diagnostics:  I have reviewed all pertinent imaging results/findings within the past 24 hours.    Assessment/Plan:   Patient has significant PVD in the left leg and ingrown toenail of the left hallux.  In particular she has severe pedal vessel disease and I would not recommend surgery on her left foot is thickened be at all avoided.  She would be at high risk for limb loss as she does not have a good surgical options.  I discussed the case with Dr. Raymond.    Active Diagnoses:    Diagnosis Date Noted POA    PRINCIPAL PROBLEM:  Acute blood loss anemia [D62] 10/01/2020 Unknown    Chronic toe pain, left foot [M79.675, G89.29] 11/09/2020 Yes    Rectal bleeding [K62.5] 11/07/2020 Yes    Chronic systolic heart failure [I50.22] 02/03/2020 Yes    DNR (do not resuscitate) [Z66] 09/30/2019 Yes     Chronic    Anemia due to chronic kidney disease [N18.9, D63.1] 07/25/2017 Yes    Pulmonary hypertension [I27.20] 07/25/2017 Yes     Chronic    Other persistent atrial fibrillation [I48.19] 07/18/2017 Yes    ESRD (end stage renal disease) on HD M,W, F [N18.6] 05/11/2016 Yes     Chronic    Gastroesophageal reflux disease [K21.9] 02/02/2016 Yes    HTN (hypertension) [I10] 08/15/2013 Yes     Chronic      Problems Resolved During this Admission:       Thank you for your consult. I will sign off. Please contact us if you have any additional  questions.    Ali Khoobehi, MD  Vascular Surgery  [facility]

## 2020-11-09 NOTE — PLAN OF CARE
Problem: Fall Injury Risk  Goal: Absence of Fall and Fall-Related Injury  Outcome: Ongoing, Progressing     Problem: Adult Inpatient Plan of Care  Goal: Plan of Care Review  Outcome: Ongoing, Progressing     Problem: Device-Related Complication Risk (Hemodialysis)  Goal: Safe, Effective Therapy Delivery  Outcome: Ongoing, Progressing

## 2020-11-09 NOTE — SUBJECTIVE & OBJECTIVE
Scheduled Meds:   calcium acetate(phosphat bind)  667 mg Oral Daily    chlorhexidine  15 mL Mouth/Throat BID    diltiaZEM  180 mg Oral Daily    epoetin alfonzo-epbx  100 Units/kg Subcutaneous Every Mon, Wed, Fri    isosorbide mononitrate  30 mg Oral Daily    magnesium oxide  400 mg Oral Daily    metoprolol succinate  25 mg Oral Nightly    pantoprazole  40 mg Intravenous BID    piperacillin-tazobactam (ZOSYN) IVPB  3.375 g Intravenous Q12H    sodium thiosulfate  25 g Intravenous Every Mon, Wed, Fri     Continuous Infusions:  PRN Meds:sodium chloride, acetaminophen, albuterol-ipratropium, dextrose 50%, dextrose 50%, glucagon (human recombinant), glucose, glucose, hydrALAZINE, melatonin, metoprolol, morphine, ondansetron, polyethylene glycol, prochlorperazine, sodium chloride 0.9%    Review of patient's allergies indicates:  No Known Allergies     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    both legs    CHF (congestive heart failure)     Encounter for blood transfusion 03/2016    Gout     Hemodialysis access, AV graft     Hypertension     Mitral valve regurgitation     Osteoarthritis     Pancreatitis     Peripheral vascular disease     Pneumonia 09/09/2017    Renal failure      Past Surgical History:   Procedure Laterality Date    ACHILLES TENDON SURGERY Right     ANGIOGRAPHY OF ARTERIOVENOUS SHUNT Right 1/7/2020    Procedure: Fistulogram with Possible Intervention;  Surgeon: Joseph Loyola MD;  Location: [facility] CATH/EP LAB;  Service: Cardiology;  Laterality: Right;    ANGIOGRAPHY OF LOWER EXTREMITY Left 3/18/2020    Procedure: Angiogram Extremity Unilateral;  Surgeon: Ali Khoobehi, MD;  Location: [facility] CATH/EP LAB;  Service: Cardiology;  Laterality: Left;    APPENDECTOMY      CARDIAC CATHETERIZATION  07/03/2017    CHOLECYSTECTOMY      COLONOSCOPY Left 10/4/2020    Procedure: COLONOSCOPY;  Surgeon: Jordan Kilpatrick MD;  Location:  [facility] ENDO;  Service: Endoscopy;  Laterality: Left;    ESOPHAGOGASTRODUODENOSCOPY Left 8/17/2020    Procedure: EGD (ESOPHAGOGASTRODUODENOSCOPY);  Surgeon: Talat Trevizo MD;  Location: [facility] ENDO;  Service: Endoscopy;  Laterality: Left;    ESOPHAGOGASTRODUODENOSCOPY Left 10/2/2020    Procedure: EGD (ESOPHAGOGASTRODUODENOSCOPY);  Surgeon: Talat Trevizo MD;  Location: [facility] ENDO;  Service: Endoscopy;  Laterality: Left;    heal surgery Right     HYSTERECTOMY      INSERTION OF STENT INTO PERIPHERAL VESSEL N/A 1/7/2020    Procedure: INSERTION, STENT, VESSEL, PERIPHERAL;  Surgeon: Joseph Loyola MD;  Location: [facility] CATH/EP LAB;  Service: Cardiology;  Laterality: N/A;    MITRAL VALVE REPLACEMENT      PARATHYROIDECTOMY      PARATHYROIDECTOMY  07/13/2017    PERCUTANEOUS TRANSLUMINAL ANGIOPLASTY OF ARTERIOVENOUS FISTULA N/A 1/7/2020    Procedure: PTA, AV FISTULA;  Surgeon: Joseph Loyola MD;  Location: [facility] CATH/EP LAB;  Service: Cardiology;  Laterality: N/A;    PERITONEAL CATHETER INSERTION      RENAL BIOPSY      vocal cord nodule      WOUND DEBRIDEMENT Left 7/13/2020    Procedure: DEBRIDEMENT, WOUND;  Surgeon: Carlos Elam III, MD;  Location: [facility] OR;  Service: General;  Laterality: Left;       Family History     Problem Relation (Age of Onset)    Arthritis Mother    Diabetes Mother, Father    Early death Sister, Sister    Heart disease Sister, Maternal Grandfather, Mother    Heart failure Mother    Hypertension Mother        Tobacco Use    Smoking status: Current Some Day Smoker     Packs/day: 0.50     Years: 45.00     Pack years: 22.50     Types: Cigarettes    Smokeless tobacco: Never Used   Substance and Sexual Activity    Alcohol use: No    Drug use: No    Sexual activity: Not Currently     Review of Systems  Objective:     Vital Signs (Most Recent):  Temp: 96.8 °F (36 °C) (11/09/20 1015)  Pulse: 80 (11/09/20 1015)  Resp: 18 (11/09/20 1015)  BP: (!) 156/53 (11/09/20 1015)  SpO2: 97 %  (11/09/20 0701) Vital Signs (24h Range):  Temp:  [96.8 °F (36 °C)-98.3 °F (36.8 °C)] 96.8 °F (36 °C)  Pulse:  [70-83] 80  Resp:  [17-19] 18  SpO2:  [97 %-100 %] 97 %  BP: (131-172)/(53-87) 156/53     Weight: 61.6 kg (135 lb 12.9 oz)  Body mass index is 22.6 kg/m².    Foot Exam    Right Foot/Ankle     Neurovascular  Dorsalis pedis: absent  Posterior tibial: absent      Left Foot/Ankle      Neurovascular  Dorsalis pedis: absent  Posterior tibial: absent          Left great toenail is hypertrophic, dystrophic, discolored tanish brown with tan, gray crumbly subungual debris. Tender to distal nail plate pressure    Laboratory:  All pertinent labs reviewed within the last 24 hours.    Diagnostic Results:  I have reviewed all pertinent imaging results/findings within the past 24 hours.

## 2020-11-09 NOTE — NURSING
Contacted MD about patient's request for diet change of NPO @ midnight , MD stated that patient needs to be NPO still, patient says she will not adhere to NPO protocol and will eat/drink.

## 2020-11-09 NOTE — PLAN OF CARE
Important Message from Medicare was sign, explained and given to patient/caregiver on 11/09/2020 at 8:42am     answered all questions.

## 2020-11-09 NOTE — PROGRESS NOTES
[facility]  Cardiology  Progress Note    Patient Name: Griselda Neely  MRN: [id number]  Admission Date: 11/7/2020  Hospital Length of Stay: 2 days  Code Status: DNR   Attending Physician: Darlene Lin MD   Primary Care Physician: Kalie Neely MD  Expected Discharge Date:   Principal Problem:Acute blood loss anemia    Subjective:       Interval History: Denies any chest pain today. Shortness of breath is at baseline.     ROS   Denies any abdominal pain.   Denies any dizziness.   Reports toe pain.   Objective:     Vital Signs (Most Recent):  Temp: 97 °F (36.1 °C) (11/09/20 1325)  Pulse: 85 (11/09/20 1325)  Resp: 18 (11/09/20 1325)  BP: (!) 172/103 (11/09/20 1325)  SpO2: 97 % (11/09/20 0701) Vital Signs (24h Range):  Temp:  [96.8 °F (36 °C)-98.3 °F (36.8 °C)] 97 °F (36.1 °C)  Pulse:  [] 85  Resp:  [17-18] 18  SpO2:  [97 %-100 %] 97 %  BP: (131-209)/() 172/103     Weight: 61.6 kg (135 lb 12.9 oz)  Body mass index is 22.6 kg/m².    SpO2: 97 %  O2 Device (Oxygen Therapy): nasal cannula      Intake/Output Summary (Last 24 hours) at 11/9/2020 1743  Last data filed at 11/9/2020 1325  Gross per 24 hour   Intake 1776 ml   Output 3800 ml   Net -2024 ml       Lines/Drains/Airways     Drain                 Hemodialysis AV Fistula 08/08/19 0214 Right upper arm 459 days          Peripheral Intravenous Line                 Peripheral IV - Single Lumen 11/07/20 1219 20 G Left Hand 2 days                Scheduled Meds:   calcium acetate(phosphat bind)  667 mg Oral Daily    chlorhexidine  15 mL Mouth/Throat BID    diltiaZEM  180 mg Oral Daily    epoetin alfonzo-epbx  100 Units/kg Subcutaneous Every Mon, Wed, Fri    isosorbide mononitrate  30 mg Oral Daily    magnesium oxide  400 mg Oral Daily    metoprolol succinate  25 mg Oral Nightly    pantoprazole  40 mg Intravenous BID    piperacillin-tazobactam (ZOSYN) IVPB  3.375 g Intravenous Q12H    sodium thiosulfate  25 g Intravenous Every Mon,  Wed, Fri     Continuous Infusions:  PRN Meds:.sodium chloride, acetaminophen, albuterol-ipratropium, dextrose 50%, dextrose 50%, glucagon (human recombinant), glucose, glucose, hydrALAZINE, melatonin, metoprolol, morphine, ondansetron, polyethylene glycol, prochlorperazine, sodium chloride 0.9%     Physical Exam  HEENT: Normocephalic, atraumatic, PERRL, Conjunctiva pink, no scleral icterus.   CVS: S1S2+, iRRR, SM+, no rubs or gallops, JVP: Normal.  LUNGS: Clear  ABDOMEN: Soft, NT, BS+  EXTREMITIES: No cyanosis or edema  NEURO: AAO X 3.       Significant Labs:   BMP:   Recent Labs   Lab 11/09/20  1020   *   *   K 4.2      CO2 20*   BUN 41*   CREATININE 6.2*   CALCIUM 7.9*   MG 1.9   , CMP   Recent Labs   Lab 11/09/20  1020   *   K 4.2      CO2 20*   *   BUN 41*   CREATININE 6.2*   CALCIUM 7.9*   ANIONGAP 15   ESTGFRAFRICA 7.1*   EGFRNONAA 6.1*   , CBC   Recent Labs   Lab 11/07/20  2030 11/09/20  1020   WBC  --  5.48   HGB 8.1* 7.5*   HCT 26.9* 24.8*   PLT  --  263   , INR   Recent Labs   Lab 11/09/20  1020   INR 1.6   , Lipid Panel No results for input(s): CHOL, HDL, LDLCALC, TRIG, CHOLHDL in the last 48 hours. and Troponin   Recent Labs   Lab 11/07/20  2030   TROPONINI 0.031       Significant Imaging: Reviewed  Assessment and Plan:     IMPRESSION:  Chest pain. Troponins not consistent with ACS. EKG with no dynamic changes. Currently chest pain free.     Atrial fibrillation.  Rate controlled.      GI bleed. Evaluated by GI and recommend decreasing the dose of coumadin.       Acute on chronic blood loss anemia. H&H better and stable.      Congestive heart failure. Reduced LV systolic function.  Does not appear to be volume overload on exam.     Mitral regurgitation.  Severe.  With severe calcification.  Not a candidate for percutaneous interventions.  After discussion with Dr. Lora, she decided to be treated only with medical management. Newly reduced LVEF to 35-40% on  4/11/2019. Status post mitral valve replacement with a size 25 mm [employer] bovine pericardial prosthesis via right thoracotomy on by Dr. Lora 3/21/2019.      End-stage renal disease on hemodialysis.  MWF. Follows with Dr. Ingram.     Tobacco abuse.  Ongoing despite counseling.     Left atrial thrombus. Improved per last TRUPTI.     PVD. Conservative management recommended.      CODE STATUS was addressed on 10/1/2020 and the patient was alert, oriented ×3.  She preferred to be a DNR/DNI.  CODE STATUS was addressed in the presence of her sister and son.       PLAN:  1. Increase diltiazem.   2. Continue current regimen otherwise.   3. Patient had nonobstructive disease last year. She would need any further ischemic work up at this point. She would be managed conservatively at this point.   4. Discussed with Dr Lin.       Katharina Powell MD  Cardiology  On license of [facility]

## 2020-11-09 NOTE — CONSULTS
Former[facility]  Podiatry  Consult Note    Patient Name: Griselda Neely  MRN: [id number]  Admission Date: 11/7/2020  Hospital Length of Stay: 2 days  Attending Physician: Darlene Lin MD  Primary Care Provider: Kalie Neely MD     Consults  Subjective:     History of Present Illness:  75-year-old female with a history of multiple medical problems including atrial fibrillation on Coumadin, CHF, chronic kidney disease on hemodialysis,  peripheral vascular disease who I was consulted on to assess patient's pain in her left great toe.  Patient states that she has had chronic pain to the left great toenail.  She states it hurts when pressure is applied the nail bed and around the nail itself.  No drainage or open wound present.  No signs of infection to the nail bed.        Scheduled Meds:   calcium acetate(phosphat bind)  667 mg Oral Daily    chlorhexidine  15 mL Mouth/Throat BID    diltiaZEM  180 mg Oral Daily    epoetin alfonzo-epbx  100 Units/kg Subcutaneous Every Mon, Wed, Fri    isosorbide mononitrate  30 mg Oral Daily    magnesium oxide  400 mg Oral Daily    metoprolol succinate  25 mg Oral Nightly    pantoprazole  40 mg Intravenous BID    piperacillin-tazobactam (ZOSYN) IVPB  3.375 g Intravenous Q12H    sodium thiosulfate  25 g Intravenous Every Mon, Wed, Fri     Continuous Infusions:  PRN Meds:sodium chloride, acetaminophen, albuterol-ipratropium, dextrose 50%, dextrose 50%, glucagon (human recombinant), glucose, glucose, hydrALAZINE, melatonin, metoprolol, morphine, ondansetron, polyethylene glycol, prochlorperazine, sodium chloride 0.9%    Review of patient's allergies indicates:  No Known Allergies     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    both legs    CHF (congestive heart failure)     Encounter for blood transfusion 03/2016    Gout     Hemodialysis access, AV graft     Hypertension     Mitral valve  regurgitation     Osteoarthritis     Pancreatitis     Peripheral vascular disease     Pneumonia 09/09/2017    Renal failure      Past Surgical History:   Procedure Laterality Date    ACHILLES TENDON SURGERY Right     ANGIOGRAPHY OF ARTERIOVENOUS SHUNT Right 1/7/2020    Procedure: Fistulogram with Possible Intervention;  Surgeon: Joseph Loyola MD;  Location: [facility] CATH/EP LAB;  Service: Cardiology;  Laterality: Right;    ANGIOGRAPHY OF LOWER EXTREMITY Left 3/18/2020    Procedure: Angiogram Extremity Unilateral;  Surgeon: Ali Khoobehi, MD;  Location: [facility] CATH/EP LAB;  Service: Cardiology;  Laterality: Left;    APPENDECTOMY      CARDIAC CATHETERIZATION  07/03/2017    CHOLECYSTECTOMY      COLONOSCOPY Left 10/4/2020    Procedure: COLONOSCOPY;  Surgeon: Jordan Kilpatrick MD;  Location: [facility] ENDO;  Service: Endoscopy;  Laterality: Left;    ESOPHAGOGASTRODUODENOSCOPY Left 8/17/2020    Procedure: EGD (ESOPHAGOGASTRODUODENOSCOPY);  Surgeon: Talat Trevizo MD;  Location: [facility] ENDO;  Service: Endoscopy;  Laterality: Left;    ESOPHAGOGASTRODUODENOSCOPY Left 10/2/2020    Procedure: EGD (ESOPHAGOGASTRODUODENOSCOPY);  Surgeon: Talat Trevizo MD;  Location: [facility] ENDO;  Service: Endoscopy;  Laterality: Left;    heal surgery Right     HYSTERECTOMY      INSERTION OF STENT INTO PERIPHERAL VESSEL N/A 1/7/2020    Procedure: INSERTION, STENT, VESSEL, PERIPHERAL;  Surgeon: Joseph Loyola MD;  Location: [facility] CATH/EP LAB;  Service: Cardiology;  Laterality: N/A;    MITRAL VALVE REPLACEMENT      PARATHYROIDECTOMY      PARATHYROIDECTOMY  07/13/2017    PERCUTANEOUS TRANSLUMINAL ANGIOPLASTY OF ARTERIOVENOUS FISTULA N/A 1/7/2020    Procedure: PTA, AV FISTULA;  Surgeon: Joseph Loyola MD;  Location: [facility] CATH/EP LAB;  Service: Cardiology;  Laterality: N/A;    PERITONEAL CATHETER INSERTION      RENAL BIOPSY      vocal cord nodule      WOUND DEBRIDEMENT Left 7/13/2020    Procedure: DEBRIDEMENT, WOUND;  Surgeon:  Carlos Elam III, MD;  Location: [facility];  Service: General;  Laterality: Left;       Family History     Problem Relation (Age of Onset)    Arthritis Mother    Diabetes Mother, Father    Early death Sister, Sister    Heart disease Sister, Maternal Grandfather, Mother    Heart failure Mother    Hypertension Mother        Tobacco Use    Smoking status: Current Some Day Smoker     Packs/day: 0.50     Years: 45.00     Pack years: 22.50     Types: Cigarettes    Smokeless tobacco: Never Used   Substance and Sexual Activity    Alcohol use: No    Drug use: No    Sexual activity: Not Currently     Review of Systems  Objective:     Vital Signs (Most Recent):  Temp: 96.8 °F (36 °C) (11/09/20 1015)  Pulse: 80 (11/09/20 1015)  Resp: 18 (11/09/20 1015)  BP: (!) 156/53 (11/09/20 1015)  SpO2: 97 % (11/09/20 0701) Vital Signs (24h Range):  Temp:  [96.8 °F (36 °C)-98.3 °F (36.8 °C)] 96.8 °F (36 °C)  Pulse:  [70-83] 80  Resp:  [17-19] 18  SpO2:  [97 %-100 %] 97 %  BP: (131-172)/(53-87) 156/53     Weight: 61.6 kg (135 lb 12.9 oz)  Body mass index is 22.6 kg/m².    Foot Exam    Right Foot/Ankle     Neurovascular  Dorsalis pedis: absent  Posterior tibial: absent      Left Foot/Ankle      Neurovascular  Dorsalis pedis: absent  Posterior tibial: absent          Left great toenail is hypertrophic, dystrophic, discolored tanish brown with tan, gray crumbly subungual debris. Tender to distal nail plate pressure    Laboratory:  All pertinent labs reviewed within the last 24 hours.    Diagnostic Results:  I have reviewed all pertinent imaging results/findings within the past 24 hours.      Assessment/Plan:     Chronic toe pain, left foot  Vascular consult placed as patient does not have palpable pulses.     Patient appears to have an ingrown nail without signs of infection of the left great toenail.  I will order an x-ray to rule out an alternate cause for patient's pain.    Patient can follow up outpatient to have the nail removed.   This is not need to be done inpatient as there are no signs of infection.  However, patient will need vascular workup and likely intervention prior to any nail removal.  In order to have her nail removed she needs to have her blood flow optimized or the nail removal will not heal.  As there are no signs of infection, vascular workup and intervention can be done prior to nail removal.            Thank you for your consult. I will sign off. Please contact us if you have any additional questions.    Lucero Raymond DPM  Podiatry  [facility]

## 2020-11-09 NOTE — PLAN OF CARE
11/08/20 2030   Patient Assessment/Suction   Level of Consciousness (AVPU) alert   Respiratory Effort Unlabored   Expansion/Accessory Muscles/Retractions no use of accessory muscles   All Lung Fields Breath Sounds clear   Rhythm/Pattern, Respiratory unlabored   Cough Frequency no cough   PRE-TX-O2   O2 Device (Oxygen Therapy) room air   SpO2 99 %   Pulse 73   Resp 18   Aerosol Therapy   $ Aerosol Therapy Charges PRN treatment not required   Respiratory Treatment Status (SVN) PRN treatment not required   Respiratory Evaluation   $ Care Plan Tech Time 15 min   Evaluation For   (CARE PLAN)

## 2020-11-10 VITALS
SYSTOLIC BLOOD PRESSURE: 162 MMHG | HEART RATE: 80 BPM | BODY MASS INDEX: 22.63 KG/M2 | WEIGHT: 135.81 LBS | RESPIRATION RATE: 16 BRPM | OXYGEN SATURATION: 100 % | TEMPERATURE: 99 F | DIASTOLIC BLOOD PRESSURE: 89 MMHG | HEIGHT: 65 IN

## 2020-11-10 LAB
BASOPHILS # BLD AUTO: 0.03 K/UL (ref 0–0.2)
BASOPHILS NFR BLD: 0.6 % (ref 0–1.9)
DIFFERENTIAL METHOD: ABNORMAL
EOSINOPHIL # BLD AUTO: 0.1 K/UL (ref 0–0.5)
EOSINOPHIL NFR BLD: 1.7 % (ref 0–8)
ERYTHROCYTE [DISTWIDTH] IN BLOOD BY AUTOMATED COUNT: 19.2 % (ref 11.5–14.5)
ESTIMATED AVG GLUCOSE: NORMAL MG/DL (ref 68–131)
HBA1C MFR BLD HPLC: NORMAL % (ref 4.5–6.2)
HCT VFR BLD AUTO: 27.5 % (ref 37–48.5)
HGB BLD-MCNC: 8.3 G/DL (ref 12–16)
IMM GRANULOCYTES # BLD AUTO: 0.02 K/UL (ref 0–0.04)
IMM GRANULOCYTES NFR BLD AUTO: 0.4 % (ref 0–0.5)
INR PPP: 1.6
LABCORP MISC TEST CODE: 1453
LABCORP MISC TEST NAME: NORMAL
LABCORP MISCELLANEOUS TEST: NORMAL
LYMPHOCYTES # BLD AUTO: 0.7 K/UL (ref 1–4.8)
LYMPHOCYTES NFR BLD: 14.5 % (ref 18–48)
MCH RBC QN AUTO: 27.2 PG (ref 27–31)
MCHC RBC AUTO-ENTMCNC: 30.2 G/DL (ref 32–36)
MCV RBC AUTO: 90 FL (ref 82–98)
MONOCYTES # BLD AUTO: 0.3 K/UL (ref 0.3–1)
MONOCYTES NFR BLD: 7.1 % (ref 4–15)
NEUTROPHILS # BLD AUTO: 3.6 K/UL (ref 1.8–7.7)
NEUTROPHILS NFR BLD: 75.7 % (ref 38–73)
NRBC BLD-RTO: 0 /100 WBC
PLATELET # BLD AUTO: 235 K/UL (ref 150–350)
PMV BLD AUTO: 10.9 FL (ref 9.2–12.9)
PROTHROMBIN TIME: 18.5 SEC (ref 10.6–14.8)
RBC # BLD AUTO: 3.05 M/UL (ref 4–5.4)
WBC # BLD AUTO: 4.76 K/UL (ref 3.9–12.7)

## 2020-11-10 PROCEDURE — 80069 RENAL FUNCTION PANEL: CPT

## 2020-11-10 PROCEDURE — 25000242 PHARM REV CODE 250 ALT 637 W/ HCPCS: Performed by: INTERNAL MEDICINE

## 2020-11-10 PROCEDURE — 85610 PROTHROMBIN TIME: CPT

## 2020-11-10 PROCEDURE — 83735 ASSAY OF MAGNESIUM: CPT

## 2020-11-10 PROCEDURE — 63600175 PHARM REV CODE 636 W HCPCS: Performed by: FAMILY MEDICINE

## 2020-11-10 PROCEDURE — 36415 COLL VENOUS BLD VENIPUNCTURE: CPT

## 2020-11-10 PROCEDURE — 25000003 PHARM REV CODE 250: Performed by: FAMILY MEDICINE

## 2020-11-10 PROCEDURE — 85025 COMPLETE CBC W/AUTO DIFF WBC: CPT

## 2020-11-10 PROCEDURE — C9113 INJ PANTOPRAZOLE SODIUM, VIA: HCPCS | Performed by: FAMILY MEDICINE

## 2020-11-10 RX ORDER — DILTIAZEM HYDROCHLORIDE 240 MG/1
240 CAPSULE, EXTENDED RELEASE ORAL DAILY
Qty: 30 CAPSULE | Refills: 11 | Status: SHIPPED | OUTPATIENT
Start: 2020-11-10 | End: 2021-01-11 | Stop reason: SDUPTHER

## 2020-11-10 RX ORDER — AMOXICILLIN AND CLAVULANATE POTASSIUM 500; 125 MG/1; MG/1
1 TABLET, FILM COATED ORAL NIGHTLY
Qty: 4 TABLET | Refills: 0 | Status: ON HOLD | OUTPATIENT
Start: 2020-11-10 | End: 2020-11-18 | Stop reason: HOSPADM

## 2020-11-10 RX ORDER — WARFARIN 2 MG/1
2 TABLET ORAL DAILY
Qty: 30 TABLET | Refills: 3 | Status: ON HOLD | OUTPATIENT
Start: 2020-11-10 | End: 2020-11-18 | Stop reason: HOSPADM

## 2020-11-10 RX ADMIN — PANTOPRAZOLE SODIUM 40 MG: 40 INJECTION, POWDER, FOR SOLUTION INTRAVENOUS at 09:11

## 2020-11-10 RX ADMIN — DILTIAZEM HYDROCHLORIDE 240 MG: 240 CAPSULE, EXTENDED RELEASE ORAL at 09:11

## 2020-11-10 RX ADMIN — CALCIUM ACETATE 667 MG: 667 CAPSULE ORAL at 09:11

## 2020-11-10 RX ADMIN — MAGNESIUM OXIDE 400 MG: 400 TABLET ORAL at 09:11

## 2020-11-10 RX ADMIN — ISOSORBIDE MONONITRATE 30 MG: 30 TABLET, EXTENDED RELEASE ORAL at 09:11

## 2020-11-10 NOTE — DISCHARGE SUMMARY
Count includes the [facility] Medicine  Discharge Summary      Patient Name: Griselda Neely  MRN: [id number]  Admission Date: 11/7/2020  Hospital Length of Stay: 3 days  Discharge Date and Time:  11/10/2020 9:01 AM  Attending Physician: Cierra Thomas MD   Discharging Provider: Cierra Thomas MD  Primary Care Provider: Kalie Neely MD      HPI:   76 yo AAF with PMH as noted below presenting with rectal bleeding.  She was discharged yesterday after recovering from sepsis due to UTI  Bleeding started on day of discharge  However she didn't report this finding to me  She wanted to go home  Bleeding has continued since discharge  No SOB, no CP, no dizziness, no palpitations  +worsening fatigue  She takes coumadin for a-fib  Pt previously had GI bleed cauterized per her report a few months ago  No fever, no chills    * No surgery found *      Hospital Course:   Pt was treated for the following medical problems in [facility] on this admission:  Acute Blood Loss Anemia  Due to lower GI bleed  Complicated by anemia of chronic disease  Chronically anticoagulated due to a-fib with atrial thrombus which   - trending H&H; worsening anemia despite pRBC transfusion  - s/p 2 u  pRBC transfusion  - PPI   - held  home coumadin during hospital course, case discussed with cardiology Dr. Powell advised lower the warfarin dose to 2 mg daily and maintain the INR level between 1.5-2  -patient denied any GI bleed on the day of discharge  - echo reviewed  - no cardiac stress procedure or cath to be done per cardiology  - GI and cardiology consult appreciated  Outpatient GI follow-up given for small-bowel capsule endoscopy  Case discussed with Dr Brown on the day of d/c     ESRD  - HD last ON 11/9 M/W/F  - nephrology following     Toe pain / Ingrown toenail  Severe PAD  - podiatry and  vascular evaluation appreciated  -conservative mx and OP f/u     Recently discharged for sepsis  Due to UTI vs PNA  - on iv  Zosyn ,  advised to complete previously prescribed Augmentin for 4days,   -BC and UC ntd     HTN  - BP elevated, diltizem dose increased  - adjusting medication prn     Chronic respiratory failure  - continue home oxygen supplemental at 4L NC    Code status : DNR/DNI, pt and family refused hospice d/c   Will be d/c with HH to home.  Instructions provided to follow up with primary care physician as outpatient. Patient verbalized understanding and is aware to contact primary care physician or return to ED if new or worsening symptoms.    Physical exam on the day of discharge:  HEENT: Normocephalic, atraumatic, PERRL, Conjunctiva pink, no scleral icterus.   CVS: S1S2+, iRRR, SM+, no rubs or gallops, JVP: Normal.  LUNGS: Clear  ABDOMEN: Soft, NT, BS+  EXTREMITIES: No cyanosis or edema  NEURO: AAO X 3.         Consults:   Consults (From admission, onward)        Status Ordering Provider     Inpatient consult to Cardiology  Once     Provider:  Vel Asif MD    Acknowledged DARLENE LIN     Inpatient consult to Gastroenterology  Once     Provider:  Christopher Chavez Jr., MD    Completed DARLENE LIN     Inpatient consult to Hospitalist  Once     Provider:  Darlene Lin MD    Acknowledged DARLENE LIN     Inpatient consult to Nephrology  Once     Provider:  Steve Gupta MD    Acknowledged DARLENE LIN     Inpatient consult to Podiatry  Once     Provider:  Lucero Raymond DPM    Acknowledged DARLENE LIN     Inpatient consult to Vascular Surgery  Once     Provider:  Joseph Loyola MD    Completed LUCERO RAYMOND          No new Assessment & Plan notes have been filed under this hospital service since the last note was generated.  Service: Hospital Medicine    Final Active Diagnoses:    Diagnosis Date Noted POA    PRINCIPAL PROBLEM:  Acute blood loss anemia [D62] 10/01/2020 Unknown    Chronic toe pain, left foot [M79.675, G89.29] 11/09/2020 Yes    Rectal bleeding [K62.5] 11/07/2020 Yes    Chronic  systolic heart failure [I50.22] 02/03/2020 Yes    DNR (do not resuscitate) [Z66] 09/30/2019 Yes     Chronic    Anemia due to chronic kidney disease [N18.9, D63.1] 07/25/2017 Yes    Pulmonary hypertension [I27.20] 07/25/2017 Yes     Chronic    Other persistent atrial fibrillation [I48.19] 07/18/2017 Yes    ESRD (end stage renal disease) on HD M,W, F [N18.6] 05/11/2016 Yes     Chronic    Gastroesophageal reflux disease [K21.9] 02/02/2016 Yes    HTN (hypertension) [I10] 08/15/2013 Yes     Chronic      Problems Resolved During this Admission:       Discharged Condition: stable    Disposition: Home-Health Care Svc    Follow Up:  Follow-up Information     Kalie Neely MD In 1 week.    Specialty: Family Medicine  Contact information:  [address]  [address]  [place] [address]  [phone]             Katharina Powell MD In 2 weeks.    Specialties: INTERVENTIONAL CARDIOLOGY, Cardiology, Cardiovascular Disease  Contact information:  [address]  [address]  [place] [address]  [phone]                 Patient Instructions:      Diet Cardiac     Notify your health care provider if you experience any of the following:  temperature >100.4     Notify your health care provider if you experience any of the following:  persistent nausea and vomiting or diarrhea     Notify your health care provider if you experience any of the following:  persistent dizziness, light-headedness, or visual disturbances     Activity as tolerated       Significant Diagnostic Studies: Labs:   BMP:   Recent Labs   Lab 11/09/20  1020   *   *   K 4.2      CO2 20*   BUN 41*   CREATININE 6.2*   CALCIUM 7.9*   MG 1.9   , CBC   Recent Labs   Lab 11/09/20  1020   WBC 5.48   HGB 7.5*   HCT 24.8*       and INR   Lab Results   Component Value Date    INR 1.6 11/09/2020    INR 2.2 11/07/2020    INR 2.0 11/05/2020       Pending Diagnostic Studies:     Procedure Component Value Units Date/Time    Basic Metabolic  Panel (BMP) [[id number]]     Order Status: Sent Lab Status: No result     Specimen: Blood     Basic Metabolic Panel (BMP) [[id number]]     Order Status: Sent Lab Status: No result     Specimen: Blood     CBC with Automated Differential [[id number]]     Order Status: Sent Lab Status: No result     Specimen: Blood     CBC with Automated Differential [[id number]] Collected: 11/08/20 0520    Order Status: Sent Lab Status: In process Updated: 11/08/20 0520    Specimen: Blood     Narrative:      Collection has been rescheduled by CHARLES at 11/08/2020 02:53 Reason:   Unable to collect, pt being uncooperative    EKG 12-lead [[id number]] Collected: 11/07/20 2155    Order Status: Sent Lab Status: In process Updated: 11/08/20 0540    Narrative:      Test Reason : R07.9,    Vent. Rate : 088 BPM     Atrial Rate : 227 BPM     P-R Int : 000 ms          QRS Dur : 094 ms      QT Int : 414 ms       P-R-T Axes : 000 -25 103 degrees     QTc Int : 500 ms    Atrial fibrillation  ST and T wave abnormality, consider anterolateral ischemia  Abnormal ECG  When compared with ECG of 07-NOV-2020 12:45,  No significant change was found    Referred By: [email or web address]   SELF           Confirmed By:     Hematocrit [[id number]] Collected: 11/08/20 0520    Order Status: Sent Lab Status: In process Updated: 11/08/20 0520    Specimen: Blood     Narrative:      Collection has been rescheduled by CHARLES at 11/08/2020 02:53 Reason:   Unable to collect, pt being uncooperative    Hemoglobin [[id number]] Collected: 11/08/20 0520    Order Status: Sent Lab Status: In process Updated: 11/08/20 0520    Specimen: Blood     Narrative:      Collection has been rescheduled by CHARLES at 11/08/2020 02:53 Reason:   Unable to collect, pt being uncooperative    Magnesium [[id number]]     Order Status: Sent Lab Status: No result     Specimen: Blood     Magnesium [[id number]]     Order Status: Sent Lab Status: No result     Specimen: Blood     Phosphorus [[id number]]     Order Status: Sent Lab  Status: No result     Specimen: Blood     Phosphorus [[id number]]     Order Status: Sent Lab Status: No result     Specimen: Blood     Protime-INR [[id number]]     Order Status: Sent Lab Status: No result     Specimen: Blood     Protime-INR [[id number]]     Order Status: Sent Lab Status: No result     Specimen: Blood     Renal Function Panel [[id number]]     Order Status: Sent Lab Status: No result     Specimen: Blood          Medications:  Reconciled Home Medications:      Medication List      START taking these medications    diltiaZEM 240 MG Cdcr  Commonly known as: DILACOR XR  Take 1 capsule (240 mg total) by mouth once daily.  Replaces: diltiaZEM 180 MG 24 hr capsule        CHANGE how you take these medications    warfarin 2 MG tablet  Commonly known as: COUMADIN  Take 1 tablet (2 mg total) by mouth Daily.  What changed:   · medication strength  · how much to take  · when to take this        CONTINUE taking these medications    amoxicillin-clavulanate 500-125mg 500-125 mg Tab  Commonly known as: AUGMENTIN  Take 1 tablet (500 mg total) by mouth every evening. Take AFTER DIALYSIS on dialysis days. for 4 days     aspirin 81 MG EC tablet  Commonly known as: ECOTRIN  Take 1 tablet (81 mg total) by mouth once daily.     calcium acetate(phosphat bind) 667 mg capsule  Commonly known as: PHOSLO  Take 667 mg by mouth once daily.     clindamycin 300 MG capsule  Commonly known as: CLEOCIN  Take 300 mg by mouth 3 (three) times daily.     isosorbide mononitrate 30 MG 24 hr tablet  Commonly known as: IMDUR  Take 30 mg by mouth once daily.     losartan 25 MG tablet  Commonly known as: COZAAR  Take 25 mg by mouth once daily.     magnesium oxide 400 mg (241.3 mg magnesium) tablet  Commonly known as: MAG-OX  Take 1 tablet by mouth once daily     metoprolol succinate 25 MG 24 hr tablet  Commonly known as: TOPROL-XL  Take 1 tablet (25 mg total) by mouth nightly.     ondansetron 4 MG Tbdl  Commonly known as: ZOFRAN-ODT  Take 4 mg by  mouth every 6 (six) hours as needed.     oxyCODONE-acetaminophen 5-325 mg per tablet  Commonly known as: PERCOCET  Take 1 tablet by mouth every 6 (six) hours as needed for Pain.     pantoprazole 40 MG tablet  Commonly known as: PROTONIX  Take 1 tablet (40 mg total) by mouth once daily.     JENNIFER-KODAK ORAL  Take 1 tablet by mouth once daily.     wheelchair An  1 Device by Misc.(Non-Drug; Combo Route) route as needed (needed for transport).     zinc sulfate 220 mg Tab tablet  Take 220 mg by mouth every other day.        STOP taking these medications    diltiaZEM 180 MG 24 hr capsule  Commonly known as: CARDIZEM CD  Replaced by: diltiaZEM 240 MG Cdcr     enoxaparin 60 mg/0.6 mL Syrg  Commonly known as: LOVENOX     HYDROcodone-acetaminophen 7.5-325 mg per tablet  Commonly known as: NORCO            Indwelling Lines/Drains at time of discharge:   Lines/Drains/Airways     Drain                 Hemodialysis AV Fistula 08/08/19 0214 Right upper arm 460 days                Time spent on the discharge of patient: 33  minutes  Patient was seen and examined on the date of discharge and determined to be suitable for discharge.         Cierra Thomas MD  Department of Hospital Medicine  [facility]

## 2020-11-10 NOTE — PLAN OF CARE
Pt established with [facility] resumption orders sent, no other  needs identified   11/10/20 0903   Final Note   Assessment Type Final Discharge Note   Anticipated Discharge Disposition Home-Health   Post-Acute Status   Post-Acute Authorization Home Health   Home Health Status Set-up Complete   Discharge Delays None known at this time

## 2020-11-10 NOTE — PT/OT/SLP PROGRESS
Physical Therapy      Patient Name:  Griselda Neely   MRN:  [id number]    Patient not seen today secondary to patient DC prior to treatment.  Rosy Viera, PT

## 2020-11-10 NOTE — PLAN OF CARE
Problem: Fall Injury Risk  Goal: Absence of Fall and Fall-Related Injury  Outcome: Ongoing, Progressing     Problem: Adult Inpatient Plan of Care  Goal: Plan of Care Review  Outcome: Ongoing, Progressing  Goal: Patient-Specific Goal (Individualization)  Outcome: Ongoing, Progressing  Goal: Optimal Comfort and Wellbeing  Outcome: Ongoing, Progressing  Goal: Rounds/Family Conference  Outcome: Ongoing, Progressing

## 2020-11-10 NOTE — HOSPITAL COURSE
Pt was treated for the following medical problems in [facility] on this admission:  Acute Blood Loss Anemia  Due to lower GI bleed  Complicated by anemia of chronic disease  Chronically anticoagulated due to a-fib with atrial thrombus which   - trending H&H; worsening anemia despite pRBC transfusion  - s/p 2 u  pRBC transfusion  - PPI   - held  home coumadin during hospital course, case discussed with cardiology Dr. Powell advised lower the warfarin dose to 2 mg daily and maintain the INR level between 1.5-2  - echo reviewed  - no cardiac stress procedure or cath to be done per cardiology  - GI and cardiology consult appreciated  Outpatient GI follow-up given for small-bowel capsule endoscopy     ESRD  - HD last ON 11/9 M/W/F  - nephrology following     Toe pain / Ingrown toenail  Severe PAD  - podiatry and  vascular evaluation appreciated  -conservative mx and OP f/u     Recently discharged for sepsis  Due to UTI vs PNA  - on iv  Zosyn , advised to complete previously prescribed Augmentin for 4days,   -BC and UC ntd     HTN  - BP elevated, diltizem dose increased  - adjusting medication prn     Chronic respiratory failure  - continue home oxygen supplemental at 4L NC    Code status : DNR/DNI, pt and family refused hospice d/c   Will be d/c with HH to home.  Instructions provided to follow up with primary care physician as outpatient. Patient verbalized understanding and is aware to contact primary care physician or return to ED if new or worsening symptoms.    Physical exam on the day of discharge:  HEENT: Normocephalic, atraumatic, PERRL, Conjunctiva pink, no scleral icterus.   CVS: S1S2+, iRRR, SM+, no rubs or gallops, JVP: Normal.  LUNGS: Clear  ABDOMEN: Soft, NT, BS+  EXTREMITIES: No cyanosis or edema  NEURO: AAO X 3.

## 2020-11-11 ENCOUNTER — TELEPHONE (OUTPATIENT)
Dept: CARDIOLOGY | Facility: CLINIC | Age: 75
End: 2020-11-11

## 2020-11-11 ENCOUNTER — TELEPHONE (OUTPATIENT)
Dept: FAMILY MEDICINE | Facility: CLINIC | Age: 75
End: 2020-11-11

## 2020-11-11 NOTE — TELEPHONE ENCOUNTER
----- Message from Kirti Flores MA sent at 11/11/2020  1:13 PM CST -----  Type: Needs Medical Advice  Who Called:  Zoya - [facility]  Best Call Back Number: [phone]  Additional Information: nurse needs order for INR.  She was just discharged from hospital and no orders are in place.

## 2020-11-12 ENCOUNTER — DOCUMENT SCAN (OUTPATIENT)
Dept: HOME HEALTH SERVICES | Facility: HOSPITAL | Age: 75
End: 2020-11-12

## 2020-11-12 ENCOUNTER — DOCUMENT SCAN (OUTPATIENT)
Dept: HOME HEALTH SERVICES | Facility: HOSPITAL | Age: 75
End: 2020-11-12
Payer: MEDICARE

## 2020-11-13 ENCOUNTER — PATIENT OUTREACH (OUTPATIENT)
Dept: FAMILY MEDICINE | Facility: CLINIC | Age: 75
End: 2020-11-13

## 2020-11-13 ENCOUNTER — TELEPHONE (OUTPATIENT)
Dept: FAMILY MEDICINE | Facility: CLINIC | Age: 75
End: 2020-11-13

## 2020-11-13 NOTE — TELEPHONE ENCOUNTER
----- Message from Margarette Briscoe sent at 11/12/2020 11:03 AM CST -----  Pt's sister is calling stated the pt needs a HFU in one week from 11/10/2020.    # [phone]

## 2020-11-13 NOTE — PATIENT INSTRUCTIONS
Discharge Information     Discharge Date:   11.10.2020    Primary Discharge Diagnosis:  Acute Blood Loss Anemia     Discharge Summary:  Reviewed      Medication & Order Review     Were medication changes made or new medications added?   Yes    If so, has the patient filled the prescriptions?  Yes     Was Home Health ordered? No    If so, has Home Health contacted patient and/or initiated services?  No    Name of Home Health Agency? N/A    Durable Medical Equipment ordered?  No     If so, has the DME provider contacted patient and delivered equipment?  N/A    Follow Up               Any problems since discharge? No    How is the patient feeling since returning home?      Have you set up recommended follow up appointments?  (cardiology, surgery, etc.)    Schedule Hospital Follow-up appointment within 7-14 days (preferably 7).      Notes:  HFU visit scheduled 11.17.2020 with PCP dr martha Carrizales

## 2020-11-15 ENCOUNTER — HOSPITAL ENCOUNTER (INPATIENT)
Facility: HOSPITAL | Age: 75
LOS: 1 days | Discharge: HOME HEALTH | DRG: 871 | End: 2020-11-18
Attending: EMERGENCY MEDICINE | Admitting: FAMILY MEDICINE
Payer: MEDICARE

## 2020-11-15 DIAGNOSIS — R00.0 TACHYCARDIA: ICD-10-CM

## 2020-11-15 DIAGNOSIS — J18.9 PNEUMONIA: ICD-10-CM

## 2020-11-15 DIAGNOSIS — R50.9 FEVER: ICD-10-CM

## 2020-11-15 DIAGNOSIS — J18.9 PNEUMONIA DUE TO INFECTIOUS ORGANISM, UNSPECIFIED LATERALITY, UNSPECIFIED PART OF LUNG: Primary | ICD-10-CM

## 2020-11-15 LAB
ALBUMIN SERPL BCP-MCNC: 3 G/DL (ref 3.5–5.2)
ALP SERPL-CCNC: 135 U/L (ref 55–135)
ALT SERPL W/O P-5'-P-CCNC: 12 U/L (ref 10–44)
ANION GAP SERPL CALC-SCNC: 19 MMOL/L (ref 8–16)
AST SERPL-CCNC: 24 U/L (ref 10–40)
BASOPHILS # BLD AUTO: 0.05 K/UL (ref 0–0.2)
BASOPHILS NFR BLD: 0.2 % (ref 0–1.9)
BILIRUB SERPL-MCNC: 1.1 MG/DL (ref 0.1–1)
BNP SERPL-MCNC: >4500 PG/ML (ref 0–99)
BUN SERPL-MCNC: 58 MG/DL (ref 8–23)
CALCIUM SERPL-MCNC: 7.9 MG/DL (ref 8.7–10.5)
CHLORIDE SERPL-SCNC: 96 MMOL/L (ref 95–110)
CO2 SERPL-SCNC: 21 MMOL/L (ref 23–29)
CREAT SERPL-MCNC: 8.1 MG/DL (ref 0.5–1.4)
CRP SERPL-MCNC: 5.11 MG/DL
DIFFERENTIAL METHOD: ABNORMAL
EOSINOPHIL # BLD AUTO: 0 K/UL (ref 0–0.5)
EOSINOPHIL NFR BLD: 0 % (ref 0–8)
ERYTHROCYTE [DISTWIDTH] IN BLOOD BY AUTOMATED COUNT: 19.3 % (ref 11.5–14.5)
ERYTHROCYTE [SEDIMENTATION RATE] IN BLOOD BY WESTERGREN METHOD: 16 MM/HR (ref 0–20)
EST. GFR  (AFRICAN AMERICAN): 5.1 ML/MIN/1.73 M^2
EST. GFR  (NON AFRICAN AMERICAN): 4.4 ML/MIN/1.73 M^2
FERRITIN SERPL-MCNC: 606 NG/ML (ref 20–300)
GLUCOSE SERPL-MCNC: 83 MG/DL (ref 70–110)
HCT VFR BLD AUTO: 31.5 % (ref 37–48.5)
HGB BLD-MCNC: 9.4 G/DL (ref 12–16)
IMM GRANULOCYTES # BLD AUTO: 0.22 K/UL (ref 0–0.04)
IMM GRANULOCYTES NFR BLD AUTO: 1 % (ref 0–0.5)
LACTATE SERPL-SCNC: 1.6 MMOL/L (ref 0.5–1.9)
LACTATE SERPL-SCNC: 2.6 MMOL/L (ref 0.5–1.9)
LDH SERPL L TO P-CCNC: 196 U/L (ref 110–260)
LYMPHOCYTES # BLD AUTO: 1 K/UL (ref 1–4.8)
LYMPHOCYTES NFR BLD: 4.8 % (ref 18–48)
MCH RBC QN AUTO: 27 PG (ref 27–31)
MCHC RBC AUTO-ENTMCNC: 29.8 G/DL (ref 32–36)
MCV RBC AUTO: 91 FL (ref 82–98)
MONOCYTES # BLD AUTO: 0.5 K/UL (ref 0.3–1)
MONOCYTES NFR BLD: 2.4 % (ref 4–15)
NEUTROPHILS # BLD AUTO: 19.9 K/UL (ref 1.8–7.7)
NEUTROPHILS NFR BLD: 91.6 % (ref 38–73)
NRBC BLD-RTO: 0 /100 WBC
PLATELET # BLD AUTO: 353 K/UL (ref 150–350)
PLATELET BLD QL SMEAR: ABNORMAL
PMV BLD AUTO: 10.5 FL (ref 9.2–12.9)
POTASSIUM SERPL-SCNC: 4.9 MMOL/L (ref 3.5–5.1)
PROCALCITONIN SERPL IA-MCNC: 1.99 NG/ML (ref 0–0.5)
PROT SERPL-MCNC: 8.1 G/DL (ref 6–8.4)
RBC # BLD AUTO: 3.48 M/UL (ref 4–5.4)
SARS-COV-2 RDRP RESP QL NAA+PROBE: NEGATIVE
SODIUM SERPL-SCNC: 136 MMOL/L (ref 136–145)
TROPONIN I SERPL DL<=0.01 NG/ML-MCNC: 0.06 NG/ML
TROPONIN I SERPL DL<=0.01 NG/ML-MCNC: 0.09 NG/ML
WBC # BLD AUTO: 21.76 K/UL (ref 3.9–12.7)

## 2020-11-15 PROCEDURE — U0002 COVID-19 LAB TEST NON-CDC: HCPCS

## 2020-11-15 PROCEDURE — 25000003 PHARM REV CODE 250: Performed by: EMERGENCY MEDICINE

## 2020-11-15 PROCEDURE — 93010 EKG 12-LEAD: ICD-10-PCS | Mod: ,,, | Performed by: INTERNAL MEDICINE

## 2020-11-15 PROCEDURE — G0378 HOSPITAL OBSERVATION PER HR: HCPCS

## 2020-11-15 PROCEDURE — 85025 COMPLETE CBC W/AUTO DIFF WBC: CPT

## 2020-11-15 PROCEDURE — 83880 ASSAY OF NATRIURETIC PEPTIDE: CPT

## 2020-11-15 PROCEDURE — 96375 TX/PRO/DX INJ NEW DRUG ADDON: CPT

## 2020-11-15 PROCEDURE — 84484 ASSAY OF TROPONIN QUANT: CPT

## 2020-11-15 PROCEDURE — 96365 THER/PROPH/DIAG IV INF INIT: CPT

## 2020-11-15 PROCEDURE — 85651 RBC SED RATE NONAUTOMATED: CPT

## 2020-11-15 PROCEDURE — 99285 EMERGENCY DEPT VISIT HI MDM: CPT | Mod: 25

## 2020-11-15 PROCEDURE — 85730 THROMBOPLASTIN TIME PARTIAL: CPT

## 2020-11-15 PROCEDURE — 80053 COMPREHEN METABOLIC PANEL: CPT

## 2020-11-15 PROCEDURE — 82728 ASSAY OF FERRITIN: CPT

## 2020-11-15 PROCEDURE — 83615 LACTATE (LD) (LDH) ENZYME: CPT

## 2020-11-15 PROCEDURE — 36415 COLL VENOUS BLD VENIPUNCTURE: CPT

## 2020-11-15 PROCEDURE — 63600175 PHARM REV CODE 636 W HCPCS: Performed by: EMERGENCY MEDICINE

## 2020-11-15 PROCEDURE — 84145 PROCALCITONIN (PCT): CPT

## 2020-11-15 PROCEDURE — 83605 ASSAY OF LACTIC ACID: CPT

## 2020-11-15 PROCEDURE — 85610 PROTHROMBIN TIME: CPT

## 2020-11-15 PROCEDURE — 84484 ASSAY OF TROPONIN QUANT: CPT | Mod: 91

## 2020-11-15 PROCEDURE — 93005 ELECTROCARDIOGRAM TRACING: CPT | Performed by: INTERNAL MEDICINE

## 2020-11-15 PROCEDURE — 87040 BLOOD CULTURE FOR BACTERIA: CPT | Mod: 59

## 2020-11-15 PROCEDURE — 86140 C-REACTIVE PROTEIN: CPT

## 2020-11-15 PROCEDURE — 93010 ELECTROCARDIOGRAM REPORT: CPT | Mod: ,,, | Performed by: INTERNAL MEDICINE

## 2020-11-15 RX ORDER — DILTIAZEM HYDROCHLORIDE 120 MG/1
240 CAPSULE, COATED, EXTENDED RELEASE ORAL DAILY
Status: DISCONTINUED | OUTPATIENT
Start: 2020-11-16 | End: 2020-11-18 | Stop reason: HOSPADM

## 2020-11-15 RX ORDER — DILTIAZEM HYDROCHLORIDE 5 MG/ML
10 INJECTION INTRAVENOUS
Status: COMPLETED | OUTPATIENT
Start: 2020-11-15 | End: 2020-11-15

## 2020-11-15 RX ORDER — SODIUM CHLORIDE 0.9 % (FLUSH) 0.9 %
10 SYRINGE (ML) INJECTION
Status: DISCONTINUED | OUTPATIENT
Start: 2020-11-15 | End: 2020-11-18 | Stop reason: HOSPADM

## 2020-11-15 RX ORDER — GLUCAGON 1 MG
1 KIT INJECTION
Status: DISCONTINUED | OUTPATIENT
Start: 2020-11-15 | End: 2020-11-18 | Stop reason: HOSPADM

## 2020-11-15 RX ORDER — PANTOPRAZOLE SODIUM 40 MG/10ML
40 INJECTION, POWDER, LYOPHILIZED, FOR SOLUTION INTRAVENOUS DAILY
Status: DISCONTINUED | OUTPATIENT
Start: 2020-11-15 | End: 2020-11-18

## 2020-11-15 RX ORDER — SODIUM CHLORIDE 9 MG/ML
1000 INJECTION, SOLUTION INTRAVENOUS
Status: DISCONTINUED | OUTPATIENT
Start: 2020-11-15 | End: 2020-11-15

## 2020-11-15 RX ORDER — ONDANSETRON 2 MG/ML
4 INJECTION INTRAMUSCULAR; INTRAVENOUS EVERY 6 HOURS PRN
Status: DISCONTINUED | OUTPATIENT
Start: 2020-11-15 | End: 2020-11-18 | Stop reason: HOSPADM

## 2020-11-15 RX ORDER — LANOLIN ALCOHOL/MO/W.PET/CERES
400 CREAM (GRAM) TOPICAL DAILY
Status: DISCONTINUED | OUTPATIENT
Start: 2020-11-16 | End: 2020-11-18 | Stop reason: HOSPADM

## 2020-11-15 RX ORDER — METOPROLOL SUCCINATE 25 MG/1
25 TABLET, EXTENDED RELEASE ORAL NIGHTLY
Status: DISCONTINUED | OUTPATIENT
Start: 2020-11-15 | End: 2020-11-18 | Stop reason: HOSPADM

## 2020-11-15 RX ORDER — IBUPROFEN 200 MG
16 TABLET ORAL
Status: DISCONTINUED | OUTPATIENT
Start: 2020-11-15 | End: 2020-11-18 | Stop reason: HOSPADM

## 2020-11-15 RX ORDER — CALCIUM ACETATE 667 MG/1
667 CAPSULE ORAL DAILY
Status: DISCONTINUED | OUTPATIENT
Start: 2020-11-16 | End: 2020-11-18 | Stop reason: HOSPADM

## 2020-11-15 RX ORDER — HEPARIN SODIUM,PORCINE/D5W 25000/250
12 INTRAVENOUS SOLUTION INTRAVENOUS CONTINUOUS
Status: DISCONTINUED | OUTPATIENT
Start: 2020-11-16 | End: 2020-11-17

## 2020-11-15 RX ORDER — ACETAMINOPHEN 650 MG/1
650 SUPPOSITORY RECTAL
Status: COMPLETED | OUTPATIENT
Start: 2020-11-15 | End: 2020-11-15

## 2020-11-15 RX ORDER — VANCOMYCIN HCL IN 5 % DEXTROSE 1G/250ML
1000 PLASTIC BAG, INJECTION (ML) INTRAVENOUS ONCE
Status: COMPLETED | OUTPATIENT
Start: 2020-11-15 | End: 2020-11-16

## 2020-11-15 RX ORDER — ASPIRIN 81 MG/1
81 TABLET ORAL DAILY
Status: DISCONTINUED | OUTPATIENT
Start: 2020-11-16 | End: 2020-11-18 | Stop reason: HOSPADM

## 2020-11-15 RX ORDER — ACETAMINOPHEN 650 MG/1
650 SUPPOSITORY RECTAL EVERY 4 HOURS PRN
Status: DISCONTINUED | OUTPATIENT
Start: 2020-11-15 | End: 2020-11-18 | Stop reason: HOSPADM

## 2020-11-15 RX ORDER — IBUPROFEN 200 MG
24 TABLET ORAL
Status: DISCONTINUED | OUTPATIENT
Start: 2020-11-15 | End: 2020-11-18 | Stop reason: HOSPADM

## 2020-11-15 RX ORDER — ISOSORBIDE MONONITRATE 30 MG/1
30 TABLET, EXTENDED RELEASE ORAL DAILY
Status: DISCONTINUED | OUTPATIENT
Start: 2020-11-16 | End: 2020-11-18 | Stop reason: HOSPADM

## 2020-11-15 RX ORDER — LOSARTAN POTASSIUM 25 MG/1
25 TABLET ORAL DAILY
Status: DISCONTINUED | OUTPATIENT
Start: 2020-11-16 | End: 2020-11-18 | Stop reason: HOSPADM

## 2020-11-15 RX ORDER — FUROSEMIDE 10 MG/ML
20 INJECTION INTRAMUSCULAR; INTRAVENOUS
Status: ACTIVE | OUTPATIENT
Start: 2020-11-15 | End: 2020-11-16

## 2020-11-15 RX ADMIN — VANCOMYCIN HYDROCHLORIDE 1000 MG: 1 INJECTION, POWDER, LYOPHILIZED, FOR SOLUTION INTRAVENOUS at 09:11

## 2020-11-15 RX ADMIN — ACETAMINOPHEN 325 MG: 325 SUPPOSITORY RECTAL at 09:11

## 2020-11-15 RX ADMIN — ACETAMINOPHEN 650 MG: 650 SUPPOSITORY RECTAL at 09:11

## 2020-11-15 RX ADMIN — DILTIAZEM HYDROCHLORIDE 10 MG: 5 INJECTION INTRAVENOUS at 07:11

## 2020-11-15 RX ADMIN — PIPERACILLIN SODIUM,TAZOBACTAM SODIUM 3.38 G: 3; .375 INJECTION, POWDER, FOR SOLUTION INTRAVENOUS at 08:11

## 2020-11-16 PROBLEM — Z79.01 SUBTHERAPEUTIC ANTICOAGULATION: Status: ACTIVE | Noted: 2020-11-16

## 2020-11-16 PROBLEM — Z51.81 SUBTHERAPEUTIC ANTICOAGULATION: Status: ACTIVE | Noted: 2020-11-16

## 2020-11-16 PROBLEM — J18.9 PNEUMONIA: Status: ACTIVE | Noted: 2020-11-16

## 2020-11-16 LAB
ALBUMIN SERPL BCP-MCNC: 2.7 G/DL (ref 3.5–5.2)
ALP SERPL-CCNC: 127 U/L (ref 55–135)
ALT SERPL W/O P-5'-P-CCNC: 11 U/L (ref 10–44)
ANION GAP SERPL CALC-SCNC: 15 MMOL/L (ref 8–16)
APTT PPP: 26.4 SEC (ref 23.6–33.3)
APTT PPP: 33.6 SEC (ref 23.6–33.3)
APTT PPP: 34.4 SEC (ref 23.6–33.3)
APTT PPP: 36 SEC (ref 23.6–33.3)
APTT PPP: 41 SEC (ref 23.6–33.3)
APTT PPP: 47.9 SEC (ref 23.6–33.3)
AST SERPL-CCNC: 20 U/L (ref 10–40)
BASOPHILS # BLD AUTO: 0.05 K/UL (ref 0–0.2)
BASOPHILS NFR BLD: 0.2 % (ref 0–1.9)
BILIRUB SERPL-MCNC: 1.1 MG/DL (ref 0.1–1)
BUN SERPL-MCNC: 65 MG/DL (ref 8–23)
CALCIUM SERPL-MCNC: 7.9 MG/DL (ref 8.7–10.5)
CHLORIDE SERPL-SCNC: 100 MMOL/L (ref 95–110)
CO2 SERPL-SCNC: 23 MMOL/L (ref 23–29)
CREAT SERPL-MCNC: 8.8 MG/DL (ref 0.5–1.4)
DIFFERENTIAL METHOD: ABNORMAL
EOSINOPHIL # BLD AUTO: 0 K/UL (ref 0–0.5)
EOSINOPHIL NFR BLD: 0 % (ref 0–8)
ERYTHROCYTE [DISTWIDTH] IN BLOOD BY AUTOMATED COUNT: 19.2 % (ref 11.5–14.5)
EST. GFR  (AFRICAN AMERICAN): 4.6 ML/MIN/1.73 M^2
EST. GFR  (NON AFRICAN AMERICAN): 4 ML/MIN/1.73 M^2
GLUCOSE SERPL-MCNC: 128 MG/DL (ref 70–110)
GLUCOSE SERPL-MCNC: 135 MG/DL (ref 70–110)
GLUCOSE SERPL-MCNC: 33 MG/DL (ref 70–110)
GLUCOSE SERPL-MCNC: 52 MG/DL (ref 70–110)
GLUCOSE SERPL-MCNC: 69 MG/DL (ref 70–110)
GLUCOSE SERPL-MCNC: 73 MG/DL (ref 70–110)
GLUCOSE SERPL-MCNC: 74 MG/DL (ref 70–110)
GLUCOSE SERPL-MCNC: 75 MG/DL (ref 70–110)
GLUCOSE SERPL-MCNC: 92 MG/DL (ref 70–110)
HCT VFR BLD AUTO: 29.3 % (ref 37–48.5)
HGB BLD-MCNC: 9 G/DL (ref 12–16)
IMM GRANULOCYTES # BLD AUTO: 0.34 K/UL (ref 0–0.04)
IMM GRANULOCYTES NFR BLD AUTO: 1.5 % (ref 0–0.5)
INR PPP: 1.4
INR PPP: 1.5
LYMPHOCYTES # BLD AUTO: 1.3 K/UL (ref 1–4.8)
LYMPHOCYTES NFR BLD: 5.7 % (ref 18–48)
MAGNESIUM SERPL-MCNC: 1.8 MG/DL (ref 1.6–2.6)
MCH RBC QN AUTO: 28 PG (ref 27–31)
MCHC RBC AUTO-ENTMCNC: 30.7 G/DL (ref 32–36)
MCV RBC AUTO: 91 FL (ref 82–98)
MONOCYTES # BLD AUTO: 0.5 K/UL (ref 0.3–1)
MONOCYTES NFR BLD: 2.1 % (ref 4–15)
NEUTROPHILS # BLD AUTO: 20.6 K/UL (ref 1.8–7.7)
NEUTROPHILS NFR BLD: 90.5 % (ref 38–73)
NRBC BLD-RTO: 0 /100 WBC
PHOSPHATE SERPL-MCNC: 4.7 MG/DL (ref 2.7–4.5)
PLATELET # BLD AUTO: 304 K/UL (ref 150–350)
PLATELET BLD QL SMEAR: ABNORMAL
PMV BLD AUTO: 10.4 FL (ref 9.2–12.9)
POTASSIUM SERPL-SCNC: 5 MMOL/L (ref 3.5–5.1)
PROT SERPL-MCNC: 7.9 G/DL (ref 6–8.4)
PROTHROMBIN TIME: 16.8 SEC (ref 10.6–14.8)
PROTHROMBIN TIME: 17.2 SEC (ref 10.6–14.8)
RBC # BLD AUTO: 3.22 M/UL (ref 4–5.4)
SODIUM SERPL-SCNC: 138 MMOL/L (ref 136–145)
TROPONIN I SERPL DL<=0.01 NG/ML-MCNC: 0.06 NG/ML
TROPONIN I SERPL DL<=0.01 NG/ML-MCNC: 0.06 NG/ML
VANCOMYCIN SERPL-MCNC: 15.8 UG/ML
WBC # BLD AUTO: 22.82 K/UL (ref 3.9–12.7)

## 2020-11-16 PROCEDURE — 85610 PROTHROMBIN TIME: CPT

## 2020-11-16 PROCEDURE — 99900035 HC TECH TIME PER 15 MIN (STAT)

## 2020-11-16 PROCEDURE — 90935 HEMODIALYSIS ONE EVALUATION: CPT

## 2020-11-16 PROCEDURE — 85730 THROMBOPLASTIN TIME PARTIAL: CPT | Mod: 91

## 2020-11-16 PROCEDURE — 27000221 HC OXYGEN, UP TO 24 HOURS

## 2020-11-16 PROCEDURE — 63600175 PHARM REV CODE 636 W HCPCS: Performed by: INTERNAL MEDICINE

## 2020-11-16 PROCEDURE — 80053 COMPREHEN METABOLIC PANEL: CPT

## 2020-11-16 PROCEDURE — 82947 ASSAY GLUCOSE BLOOD QUANT: CPT

## 2020-11-16 PROCEDURE — 82962 GLUCOSE BLOOD TEST: CPT

## 2020-11-16 PROCEDURE — 94761 N-INVAS EAR/PLS OXIMETRY MLT: CPT

## 2020-11-16 PROCEDURE — 99223 1ST HOSP IP/OBS HIGH 75: CPT | Mod: ,,, | Performed by: INTERNAL MEDICINE

## 2020-11-16 PROCEDURE — 84484 ASSAY OF TROPONIN QUANT: CPT | Mod: 91

## 2020-11-16 PROCEDURE — 83735 ASSAY OF MAGNESIUM: CPT

## 2020-11-16 PROCEDURE — 63600175 PHARM REV CODE 636 W HCPCS: Performed by: FAMILY MEDICINE

## 2020-11-16 PROCEDURE — 80202 ASSAY OF VANCOMYCIN: CPT

## 2020-11-16 PROCEDURE — 99223 PR INITIAL HOSPITAL CARE,LEVL III: ICD-10-PCS | Mod: ,,, | Performed by: INTERNAL MEDICINE

## 2020-11-16 PROCEDURE — C9113 INJ PANTOPRAZOLE SODIUM, VIA: HCPCS | Performed by: NURSE PRACTITIONER

## 2020-11-16 PROCEDURE — 36415 COLL VENOUS BLD VENIPUNCTURE: CPT

## 2020-11-16 PROCEDURE — 25000003 PHARM REV CODE 250: Performed by: FAMILY MEDICINE

## 2020-11-16 PROCEDURE — 63600175 PHARM REV CODE 636 W HCPCS: Performed by: NURSE PRACTITIONER

## 2020-11-16 PROCEDURE — 25000003 PHARM REV CODE 250: Performed by: NURSE PRACTITIONER

## 2020-11-16 PROCEDURE — 84100 ASSAY OF PHOSPHORUS: CPT

## 2020-11-16 PROCEDURE — 25000003 PHARM REV CODE 250: Performed by: INTERNAL MEDICINE

## 2020-11-16 PROCEDURE — G0378 HOSPITAL OBSERVATION PER HR: HCPCS

## 2020-11-16 PROCEDURE — 85025 COMPLETE CBC W/AUTO DIFF WBC: CPT

## 2020-11-16 RX ORDER — MORPHINE SULFATE 2 MG/ML
2 INJECTION, SOLUTION INTRAMUSCULAR; INTRAVENOUS ONCE
Status: COMPLETED | OUTPATIENT
Start: 2020-11-16 | End: 2020-11-16

## 2020-11-16 RX ORDER — SODIUM CHLORIDE 9 MG/ML
INJECTION, SOLUTION INTRAVENOUS
Status: DISCONTINUED | OUTPATIENT
Start: 2020-11-16 | End: 2020-11-18 | Stop reason: HOSPADM

## 2020-11-16 RX ORDER — SODIUM CHLORIDE 9 MG/ML
INJECTION, SOLUTION INTRAVENOUS ONCE
Status: DISCONTINUED | OUTPATIENT
Start: 2020-11-16 | End: 2020-11-18 | Stop reason: HOSPADM

## 2020-11-16 RX ORDER — MUPIROCIN 20 MG/G
OINTMENT TOPICAL 2 TIMES DAILY
Status: DISCONTINUED | OUTPATIENT
Start: 2020-11-16 | End: 2020-11-18 | Stop reason: HOSPADM

## 2020-11-16 RX ORDER — SODIUM THIOSULFATE 250 MG/ML
25 INJECTION, SOLUTION INTRAVENOUS
Status: DISCONTINUED | OUTPATIENT
Start: 2020-11-16 | End: 2020-11-18 | Stop reason: HOSPADM

## 2020-11-16 RX ADMIN — HEPARIN SODIUM AND DEXTROSE 12 UNITS/KG/HR: 10000; 5 INJECTION INTRAVENOUS at 12:11

## 2020-11-16 RX ADMIN — DEXTROSE MONOHYDRATE 12.5 G: 25 INJECTION, SOLUTION INTRAVENOUS at 02:11

## 2020-11-16 RX ADMIN — MUPIROCIN: 20 OINTMENT TOPICAL at 10:11

## 2020-11-16 RX ADMIN — PANTOPRAZOLE SODIUM 40 MG: 40 INJECTION, POWDER, FOR SOLUTION INTRAVENOUS at 12:11

## 2020-11-16 RX ADMIN — HEPARIN SODIUM AND DEXTROSE 15 UNITS/KG/HR: 10000; 5 INJECTION INTRAVENOUS at 08:11

## 2020-11-16 RX ADMIN — HEPARIN SODIUM AND DEXTROSE 18 UNITS/KG/HR: 10000; 5 INJECTION INTRAVENOUS at 04:11

## 2020-11-16 RX ADMIN — METOPROLOL SUCCINATE 25 MG: 25 TABLET, FILM COATED, EXTENDED RELEASE ORAL at 10:11

## 2020-11-16 RX ADMIN — DEXTROSE MONOHYDRATE 25 G: 25 INJECTION, SOLUTION INTRAVENOUS at 11:11

## 2020-11-16 RX ADMIN — SODIUM THIOSULFATE 25 G: 250 INJECTION, SOLUTION INTRAVENOUS at 11:11

## 2020-11-16 RX ADMIN — MORPHINE SULFATE 2 MG: 2 INJECTION, SOLUTION INTRAMUSCULAR; INTRAVENOUS at 02:11

## 2020-11-16 RX ADMIN — DEXTROSE MONOHYDRATE 12.5 G: 25 INJECTION, SOLUTION INTRAVENOUS at 06:11

## 2020-11-16 RX ADMIN — MEROPENEM 500 MG: 1 INJECTION, POWDER, FOR SOLUTION INTRAVENOUS at 12:11

## 2020-11-16 NOTE — PROGRESS NOTES
Pharmacokinetic Initial Assessment: IV Vancomycin    Assessment/Plan:    Initiate intravenous vancomycin with loading dose of 1000 mg once with subsequent doses when random concentrations are less than 20 mcg/mL  Desired empiric serum trough concentration is 10 to 15 mcg/mL  Draw vancomycin random level on 11/16 at 2030.  Pharmacy will continue to follow and monitor vancomycin.      Please contact pharmacy at extension [phone] with any questions regarding this assessment.     Thank you for the consult,   Baudilio Downs       Patient brief summary:  Griselda Neely is a 75 y.o. female initiated on antimicrobial therapy with IV Vancomycin for treatment of suspected lower respiratory infection    Drug Allergies:   Review of patient's allergies indicates:  No Known Allergies    Actual Body Weight:   63 kg    Renal Function:   Estimated Creatinine Clearance: 5.5 mL/min (A) (based on SCr of 8.1 mg/dL (H)).,     CBC (last 72 hours):  Recent Labs   Lab Result Units 11/15/20  1920   WBC K/uL 21.76*   Hemoglobin g/dL 9.4*   Hematocrit % 31.5*   Platelets K/uL 353*   Gran % % 91.6*   Lymph % % 4.8*   Mono % % 2.4*   Eosinophil % % 0.0   Basophil % % 0.2   Differential Method  Automated       Metabolic Panel (last 72 hours):  Recent Labs   Lab Result Units 11/15/20  1920   Sodium mmol/L 136   Potassium mmol/L 4.9   Chloride mmol/L 96   CO2 mmol/L 21*   Glucose mg/dL 83   BUN mg/dL 58*   Creatinine mg/dL 8.1*   Albumin g/dL 3.0*   Total Bilirubin mg/dL 1.1*   Alkaline Phosphatase U/L 135   AST U/L 24   ALT U/L 12       Drug levels (last 3 results):  No results for input(s): VANCOMYCINRA, VANCOMYCINPE, VANCOMYCINTR in the last 72 hours.    Microbiologic Results:  Microbiology Results (last 7 days)       Procedure Component Value Units Date/Time    Culture, Respiratory with Gram Stain [[id number]]     Order Status: Sent Specimen: Respiratory from Sputum     Blood culture x two cultures. Draw prior to antibiotics. [[id number]] Collected:  11/15/20 1920    Order Status: Sent Specimen: Blood from Peripheral, Wrist, Left Updated: 11/15/20 2005    Blood culture x two cultures. Draw prior to antibiotics. [[id number]] Collected: 11/15/20 1935    Order Status: Sent Specimen: Blood from Peripheral, Hand, Left Updated: 11/15/20 1944

## 2020-11-16 NOTE — CONSULTS
[facility]  Cardiology  Consult Note    Patient Name: Griselda Neely  MRN: [id number]  Admission Date: 11/15/2020  Hospital Length of Stay: 0 days  Code Status: DNR   Attending Provider: Andree Monsivais MD   Consulting Provider: Katharina Powell MD  Primary Care Physician: Kalie Neely MD  Principal Problem:<principal problem not specified>    Patient information was obtained from patient, past medical records and ER records.     Consults  Subjective:     REASON FOR CONSULT:  Tachycadrai     HPI:  75-year-old female with a past medical history significant for congestive heart failure with LV systolic dysfunction, end-stage renal disease on hemodialysis, persistent longstanding atrial fibrillation, left atrial appendage thrombus was brought in by family members secondary to altered mental status and fever.  Patient reportedly was in her usual state of health till yesterday.  She went to [employer] and all of a sudden she started having some confusion.  Patient's temperature initially was normal however towards the evening son noticed that the were temperature is has been as high as 102.  He subsequently brought her to the emergency room for further evaluation and management.  No meaningful history could be obtained from the patient due to altered mental status at this point in time.  Per the patient's son, she has not complained of any chest pain, increased shortness of breath, palpitations.  She has been having some cough.  She did not complain of any abdominal pain.  She reportedly had some diarrhea last night.  She did not complain of any dysuria.    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    both legs    CHF (congestive heart failure)     Encounter for blood transfusion 03/2016    Gout     Hemodialysis access, AV graft     Hypertension     Mitral valve regurgitation     Osteoarthritis     Pancreatitis     Peripheral  vascular disease     Pneumonia 09/09/2017    Renal failure        Past Surgical History:   Procedure Laterality Date    ACHILLES TENDON SURGERY Right     ANGIOGRAPHY OF ARTERIOVENOUS SHUNT Right 1/7/2020    Procedure: Fistulogram with Possible Intervention;  Surgeon: Joseph Loyola MD;  Location: [facility] CATH/EP LAB;  Service: Cardiology;  Laterality: Right;    ANGIOGRAPHY OF LOWER EXTREMITY Left 3/18/2020    Procedure: Angiogram Extremity Unilateral;  Surgeon: Ali Khoobehi, MD;  Location: [facility] CATH/EP LAB;  Service: Cardiology;  Laterality: Left;    APPENDECTOMY      CARDIAC CATHETERIZATION  07/03/2017    CHOLECYSTECTOMY      COLONOSCOPY Left 10/4/2020    Procedure: COLONOSCOPY;  Surgeon: Jordan Kilpatrick MD;  Location: [facility] ENDO;  Service: Endoscopy;  Laterality: Left;    ESOPHAGOGASTRODUODENOSCOPY Left 8/17/2020    Procedure: EGD (ESOPHAGOGASTRODUODENOSCOPY);  Surgeon: Talat Trevizo MD;  Location: [facility] ENDO;  Service: Endoscopy;  Laterality: Left;    ESOPHAGOGASTRODUODENOSCOPY Left 10/2/2020    Procedure: EGD (ESOPHAGOGASTRODUODENOSCOPY);  Surgeon: Talat Trevizo MD;  Location: [facility] ENDO;  Service: Endoscopy;  Laterality: Left;    heal surgery Right     HYSTERECTOMY      INSERTION OF STENT INTO PERIPHERAL VESSEL N/A 1/7/2020    Procedure: INSERTION, STENT, VESSEL, PERIPHERAL;  Surgeon: Joseph Loyola MD;  Location: [facility] CATH/EP LAB;  Service: Cardiology;  Laterality: N/A;    MITRAL VALVE REPLACEMENT      PARATHYROIDECTOMY      PARATHYROIDECTOMY  07/13/2017    PERCUTANEOUS TRANSLUMINAL ANGIOPLASTY OF ARTERIOVENOUS FISTULA N/A 1/7/2020    Procedure: PTA, AV FISTULA;  Surgeon: Joseph Loyola MD;  Location: [facility] CATH/EP LAB;  Service: Cardiology;  Laterality: N/A;    PERITONEAL CATHETER INSERTION      RENAL BIOPSY      vocal cord nodule      WOUND DEBRIDEMENT Left 7/13/2020    Procedure: DEBRIDEMENT, WOUND;  Surgeon: Carlos Elam III, MD;  Location: [facility] OR;  Service: General;   Laterality: Left;       Review of patient's allergies indicates:  No Known Allergies    No current facility-administered medications on file prior to encounter.      Current Outpatient Medications on File Prior to Encounter   Medication Sig    aspirin (ECOTRIN) 81 MG EC tablet Take 1 tablet (81 mg total) by mouth once daily.    calcium acetate (PHOSLO) 667 mg capsule Take 667 mg by mouth once daily.     clindamycin (CLEOCIN) 300 MG capsule Take 300 mg by mouth 3 (three) times daily.     diltiaZEM (DILACOR XR) 240 MG CDCR Take 1 capsule (240 mg total) by mouth once daily.    folic acid/vit B complex and C (JENNIFER-KODAK ORAL) Take 1 tablet by mouth once daily.    isosorbide mononitrate (IMDUR) 30 MG 24 hr tablet Take 30 mg by mouth once daily.    losartan (COZAAR) 25 MG tablet Take 25 mg by mouth once daily.    magnesium oxide (MAG-OX) 400 mg (241.3 mg magnesium) tablet Take 1 tablet by mouth once daily (Patient taking differently: Take 400 mg by mouth once daily. )    metoprolol succinate (TOPROL-XL) 25 MG 24 hr tablet Take 1 tablet (25 mg total) by mouth nightly.    ondansetron (ZOFRAN-ODT) 4 MG TbDL Take 4 mg by mouth every 6 (six) hours as needed.    oxyCODONE-acetaminophen (PERCOCET) 5-325 mg per tablet Take 1 tablet by mouth every 6 (six) hours as needed for Pain.    pantoprazole (PROTONIX) 40 MG tablet Take 1 tablet (40 mg total) by mouth once daily.    warfarin (COUMADIN) 2 MG tablet Take 1 tablet (2 mg total) by mouth Daily.    wheelchair An 1 Device by Misc.(Non-Drug; Combo Route) route as needed (needed for transport).    zinc sulfate 220 mg Tab tablet Take 220 mg by mouth every other day.       Scheduled Meds:   sodium chloride 0.9%   Intravenous Once    aspirin  81 mg Oral Daily    calcium acetate(phosphat bind)  667 mg Oral Daily    diltiaZEM  240 mg Oral Daily    isosorbide mononitrate  30 mg Oral Daily    losartan  25 mg Oral Daily    magnesium oxide  400 mg Oral Daily     meropenem (MERREM) IVPB  500 mg Intravenous Q24H    metoprolol succinate  25 mg Oral Nightly    mupirocin   Nasal BID    pantoprazole  40 mg Intravenous Daily    sodium thiosulfate  25 g Intravenous Every Mon, Wed, Fri     Continuous Infusions:   heparin (porcine) in D5W 18 Units/kg/hr (11/16/20 1631)     PRN Meds:.sodium chloride 0.9%, acetaminophen, dextrose 50%, dextrose 50%, glucagon (human recombinant), glucose, glucose, heparin (PORCINE), heparin (PORCINE), insulin regular, ondansetron, sodium chloride 0.9%, Pharmacy to dose Vancomycin consult **AND** vancomycin - pharmacy to dose    Family History     Problem Relation (Age of Onset)    Arthritis Mother    Diabetes Mother, Father    Early death Sister, Sister    Heart disease Sister, Maternal Grandfather, Mother    Heart failure Mother    Hypertension Mother          Tobacco Use    Smoking status: Current Some Day Smoker     Packs/day: 0.50     Years: 45.00     Pack years: 22.50     Types: Cigarettes    Smokeless tobacco: Never Used   Substance and Sexual Activity    Alcohol use: No    Drug use: No    Sexual activity: Not Currently       ROS   Unobtainable due to altered mental status  Objective:     Vital Signs (Most Recent):  Temp: 98.2 °F (36.8 °C) (11/16/20 1500)  Pulse: 96 (11/16/20 1500)  Resp: 18 (11/16/20 1500)  BP: 132/87 (11/16/20 1500)  SpO2: 100 % (11/16/20 1500) Vital Signs (24h Range):  Temp:  [98 °F (36.7 °C)-102.4 °F (39.1 °C)] 98.2 °F (36.8 °C)  Pulse:  [] 96  Resp:  [16-31] 18  SpO2:  [93 %-100 %] 100 %  BP: (126-186)/() 132/87     Weight: 63 kg (138 lb 14.2 oz)  Body mass index is 23.11 kg/m².    SpO2: 100 %  O2 Device (Oxygen Therapy): nasal cannula      Intake/Output Summary (Last 24 hours) at 11/16/2020 1700  Last data filed at 11/16/2020 1315  Gross per 24 hour   Intake 686.21 ml   Output 3500 ml   Net -2813.79 ml       Lines/Drains/Airways     Drain                 Hemodialysis AV Fistula 08/08/19 0214 Right upper  arm 466 days          Peripheral Intravenous Line                 Peripheral IV - Single Lumen 11/15/20 1920 20 G Left Wrist less than 1 day                Physical Exam  HEENT: Normocephalic, atraumatic, PERRL, Conjunctiva pink, no scleral icterus.   CVS: S1S2+, iRRR, no murmurs, rubs or gallops, JVP: Normal.  LUNGS: No wheezes or crackles  ABDOMEN: Soft, NT, BS+  EXTREMITIES: No cyanosis, clubbing or edema  NEURO: Does not obey simple commands.     Significant Labs:   BMP:   Recent Labs   Lab 11/15/20  1920 11/16/20  0430 11/16/20  1118   GLU 83 69* 75    138  --    K 4.9 5.0  --    CL 96 100  --    CO2 21* 23  --    BUN 58* 65*  --    CREATININE 8.1* 8.8*  --    CALCIUM 7.9* 7.9*  --    MG  --  1.8  --    , CMP   Recent Labs   Lab 11/15/20  1920 11/16/20  0430 11/16/20  1118    138  --    K 4.9 5.0  --    CL 96 100  --    CO2 21* 23  --    GLU 83 69* 75   BUN 58* 65*  --    CREATININE 8.1* 8.8*  --    CALCIUM 7.9* 7.9*  --    PROT 8.1 7.9  --    ALBUMIN 3.0* 2.7*  --    BILITOT 1.1* 1.1*  --    ALKPHOS 135 127  --    AST 24 20  --    ALT 12 11  --    ANIONGAP 19* 15  --    ESTGFRAFRICA 5.1* 4.6*  --    EGFRNONAA 4.4* 4.0*  --    , CBC   Recent Labs   Lab 11/15/20  1920 11/16/20  0430   WBC 21.76* 22.82*   HGB 9.4* 9.0*   HCT 31.5* 29.3*   * 304   , INR   Recent Labs   Lab 11/15/20  2348 11/16/20  0430   INR 1.4 1.5   , Lipid Panel No results for input(s): CHOL, HDL, LDLCALC, TRIG, CHOLHDL in the last 48 hours. and Troponin   Recent Labs   Lab 11/15/20  2315 11/16/20 0430 11/16/20  1118   TROPONINI 0.055* 0.064* 0.055*       Significant Imaging: Reviewed  Assessment and Plan:     IMPRESSION:  Fever. Etiology?    Altered mental status. ?Menigitis or encephalitis.    Atrial fibrillation.  Rate controlled.      Recurrent GI bleed. Evaluated by GI and recommend decreasing the dose of coumadin.       Congestive heart failure. Reduced LV systolic function.  Does not appear to be volume overload on  exam.     Mitral regurgitation.  Severe.  With severe calcification.  Not a candidate for percutaneous interventions.  After discussion with Dr. Lora, she decided to be treated only with medical management. Newly reduced LVEF to 35-40% on 4/11/2019. Status post mitral valve replacement with a size 25 mm [employer] bovine pericardial prosthesis via right thoracotomy on by Dr. Lora 3/21/2019.      End-stage renal disease on hemodialysis.  MWF. Follows with Dr. Ingram.     Tobacco abuse.  Ongoing despite counseling.     Left atrial thrombus. Improved per last TRUPTI.      PVD. Conservative management recommended.      CODE STATUS was addressed on 10/1/2020 and the patient was alert, oriented ×3.  She preferred to be a DNR/DNI.  CODE STATUS was addressed in the presence of her sister and son.     RECOMMENDATIONS:  1.  Follow-up on the cultures.  2.  Continue her home medical regimen from a cardiac standpoint including Toprol-XL as well as Cardizem.  3.  If fevers do not subside she may need to have a TRUPTI done to evaluate for endocarditis.  4.  Further decisions to follow based upon the hospital course.  5. Discussed with Hospitalist.     Thank you for your consult. I will follow-up with patient. Please contact us if you have any additional questions.    Katharina Powell MD  Cardiology   [facility]

## 2020-11-16 NOTE — PROGRESS NOTES
Critical access hospital Medicine    Progress Note    Patient Name: Griselda Neely  MRN: [id number]  Patient Class: OP- Observation   Admission Date: 11/15/2020  6:50 PM  Length of Stay: 0  Attending Physician: Andree Monsivais MD  Primary Care Provider: Kalie Neely MD  Face-to-Face encounter date: 11/16/2020  Code status:  Chief Complaint: Fever (TMAX (100.6) 30 mins PTA - no meds given PTA. Hx of dialysis MWF (compliant with dialysis); Recently admitted for pnuemonia x 2 within 2 months. Oriented to person.)        Subjective:    HPI: History was obtained from the the family present at the bedside and ER physician Sign-out. The patient is unable to provide history due to mental status changes. Patient presents to the ED by her son who reports she is weak and confused. The patient went to [employer] with her son today who reports they had to leave [employer] early because the patient reports she was not feeling well. This afternoon just prior to arrival he checked her temperature and she was noted to have a fever 100.6, so he brought her to the hospital for evaluation. The son reports the patient was recently admitted to the hospital for pneumonia and she presented confused at that time. She was admitted a second time recently for GI bleed. He states he took it upon himself to discontinue the coumadin for repeated admission for GI bleeds.   In the emergency room, patient is febrile 100.6 initially and then 102.4. She is tachycardic and hypertensive. Her oxygen saturation is wnl on her usual home dose of 4L NC. CBC with WBC 21,000, H/H 9.4/31.5. CMP with CO2 21, BuN 58, creat 8.1. BNP >4500 and troponin elevated 0.088. I have reviewed the EKG and it shows A-flutter at 132bpm, nonspecific T wave abnormality. CXR with central pulmonary vascular prominence and tiny right pleural effusion. The patient is treated with antipyretics, IV antibiotics,  IV diltiazem.      Handoff summary: Loi assumed care  11/16/20 only  11/16:  Patient stable and AOx3.  Patient currently being treated for pneumonia which pro calcitonin elevated at 1.99. Patient continued on IV Zosyn and vancomycin, sputum and blood cultures pending.  Patient to have dialysis today.  Cardiology consulted for recommendations as patient has known atrial thrombus and is in atrial fib and has MVR.  Patient was placed on heparin drip pending cardiac consultation     Interval History: Patient is doing well.  No concerns/issues overnight reported by the patient or the nursing staff.    Review of Systems All other Review of Systems were found to be negative expect for that mentioned already in HPI.     Objective:     Vitals:    11/16/20 1200 11/16/20 1230 11/16/20 1300 11/16/20 1315   BP: (!) 186/93 (!) 178/80 (!) 175/79 (!) 145/79   BP Location:       Patient Position:       Pulse: 90 (!) 117 (!) 115 (!) 118   Resp:    16   Temp:    98 °F (36.7 °C)   TempSrc:       SpO2:       Weight:       Height:            Vitals reviewed.  Constitutional: No distress.   HENT: NC  Head: Atraumatic.   Cardiovascular: Normal rate, regular rhythm and normal heart sounds.   Pulmonary/Chest: Effort normal. No wheezes.   Abdominal: Soft. Bowel sounds are normal. No distension and no mass. No tenderness  Neurological: Alert.   Skin: Skin is warm and dry.   Psych: Appropriate mood and affect    Following labs were Reviewed   CBC:  Recent Labs   Lab 11/16/20  0430   WBC 22.82*   HGB 9.0*   HCT 29.3*        CMP:  Recent Labs   Lab 11/16/20  0430   CALCIUM 7.9*   ALBUMIN 2.7*   PROT 7.9      K 5.0   CO2 23      BUN 65*   CREATININE 8.8*   ALKPHOS 127   ALT 11   AST 20   BILITOT 1.1*       Micro Results  Microbiology Results (last 7 days)     Procedure Component Value Units Date/Time    Clostridium difficile EIA [[id number]]     Order Status: No result Specimen: Stool     Blood culture x two cultures. Draw prior to antibiotics. [[id number]] Collected: 11/15/20 1920     Order Status: Completed Specimen: Blood from Peripheral, Wrist, Left Updated: 11/16/20 0317     Blood Culture, Routine No Growth to date    Narrative:      Aerobic and anaerobic    Blood culture x two cultures. Draw prior to antibiotics. [[id number]] Collected: 11/15/20 1935    Order Status: Completed Specimen: Blood from Peripheral, Hand, Left Updated: 11/16/20 0158     Blood Culture, Routine No Growth to date    Narrative:      Aerobic and anaerobic    Culture, Respiratory with Gram Stain [[id number]]     Order Status: Sent Specimen: Respiratory from Sputum            Radiology Reports  X-ray Chest Ap Portable  Result Date: 11/15/2020  Unchanged cardiomegaly, central pulmonary vascular prominence, and tiny right pleural effusion.       Meds  Scheduled Meds:   sodium chloride 0.9%   Intravenous Once    aspirin  81 mg Oral Daily    calcium acetate(phosphat bind)  667 mg Oral Daily    diltiaZEM  240 mg Oral Daily    isosorbide mononitrate  30 mg Oral Daily    losartan  25 mg Oral Daily    magnesium oxide  400 mg Oral Daily    meropenem (MERREM) IVPB  500 mg Intravenous Q24H    metoprolol succinate  25 mg Oral Nightly    mupirocin   Nasal BID    pantoprazole  40 mg Intravenous Daily    sodium thiosulfate  25 g Intravenous Every Mon, Wed, Fri     Continuous Infusions:   heparin (porcine) in D5W 15 Units/kg/hr (11/16/20 0824)     PRN Meds:.sodium chloride 0.9%, acetaminophen, dextrose 50%, dextrose 50%, glucagon (human recombinant), glucose, glucose, heparin (PORCINE), heparin (PORCINE), insulin regular, ondansetron, sodium chloride 0.9%, Pharmacy to dose Vancomycin consult **AND** vancomycin - pharmacy to dose.    Assessment & Plan:     Active Hospital Problems    Diagnosis    Pneumonia  - sputum culture, blood culture pending  - continue IV Zosyn and vancomycin  - continue to monitor      Chronic systolic heart failure  - compensated  - continue home medication of isosorbide mononitrate 30 mg  daily    Subtherapeutic INR  - INR 1.5  - patient not taking Coumadin anticoagulation on hold due to multiple GI bleeds  - cardiac consultation for evaluation of atrial thrombosis  - patient on heparin drip pending cardiac consultation      Anemia due to chronic kidney disease  - stable  - continue to monitor      ESRD (end stage renal disease) on HD M,W, F  - patient for dialysis      HTN (hypertension)  - uncontrolled  - resume home medication of losartan 25 mg daily, Toprol XL 25 mg daily       Discharge Planning:   Is the patient medically ready for discharge?: No    Reason for patient still in hospital (select all that apply): Patient trending condition and Treatment    Above encounter included review of the medical records, interviewing and examining the patient face-to-face, discussion with family and other health care providers, ordering and interpreting lab/test results and formulating a plan of care.     Medical Decision Making:      [_] Low Complexity  [_] Moderate Complexity  [x] High Complexity      Andree Monsivais MD  Department of Hospital Medicine   [facility]

## 2020-11-16 NOTE — PLAN OF CARE
11/16/20 0700   PRE-TX-O2   O2 Device (Oxygen Therapy) nasal cannula   $ Is the patient on Low Flow Oxygen? Yes   Flow (L/min) 4   SpO2 100 %   Pulse Oximetry Type Intermittent   $ Pulse Oximetry - Multiple Charge Pulse Oximetry - Multiple   Pulse 100   Resp 18   Respiratory Evaluation   $ Care Plan Tech Time 15 min

## 2020-11-16 NOTE — ED PROVIDER NOTES
Encounter Date: 11/15/2020       History     Chief Complaint   Patient presents with    Fever     TMAX (100.6) 30 mins PTA - no meds given PTA. Hx of dialysis MWF (compliant with dialysis); Recently admitted for pnuemonia x 2 within 2 months. Oriented to person.     Chief complaint is decreased level of consciousness.  This nice patient went to [facility].  Now she is weak and slightly confused.  She has been like this in the past when she has been septic.  She recently was admitted to the hospital here.  She is a dialysis patient.  She is a DNR as well.  She was dialyzed Friday 2 L. And as a mention with discharge today.  She normally 4 L oxygen at home.  The son wants no CPR.  No intubation.  Only oxygenation and CPAP or BiPAP as needed.  She does have a history of atrial fib and her heart rate now is 119.  She has no complaints.        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    both legs    CHF (congestive heart failure)     Encounter for blood transfusion 03/2016    Gout     Hemodialysis access, AV graft     Hypertension     Mitral valve regurgitation     Osteoarthritis     Pancreatitis     Peripheral vascular disease     Pneumonia 09/09/2017    Renal failure      Past Surgical History:   Procedure Laterality Date    ACHILLES TENDON SURGERY Right     ANGIOGRAPHY OF ARTERIOVENOUS SHUNT Right 1/7/2020    Procedure: Fistulogram with Possible Intervention;  Surgeon: Joseph Loyola MD;  Location: [facility] CATH/EP LAB;  Service: Cardiology;  Laterality: Right;    ANGIOGRAPHY OF LOWER EXTREMITY Left 3/18/2020    Procedure: Angiogram Extremity Unilateral;  Surgeon: Ali Khoobehi, MD;  Location: [facility] CATH/EP LAB;  Service: Cardiology;  Laterality: Left;    APPENDECTOMY      CARDIAC CATHETERIZATION  07/03/2017    CHOLECYSTECTOMY      COLONOSCOPY Left 10/4/2020    Procedure:  COLONOSCOPY;  Surgeon: Jordan Kilpatrick MD;  Location: [facility] ENDO;  Service: Endoscopy;  Laterality: Left;    ESOPHAGOGASTRODUODENOSCOPY Left 8/17/2020    Procedure: EGD (ESOPHAGOGASTRODUODENOSCOPY);  Surgeon: Talat Trevizo MD;  Location: [facility] ENDO;  Service: Endoscopy;  Laterality: Left;    ESOPHAGOGASTRODUODENOSCOPY Left 10/2/2020    Procedure: EGD (ESOPHAGOGASTRODUODENOSCOPY);  Surgeon: Talat Trevizo MD;  Location: [facility] ENDO;  Service: Endoscopy;  Laterality: Left;    heal surgery Right     HYSTERECTOMY      INSERTION OF STENT INTO PERIPHERAL VESSEL N/A 1/7/2020    Procedure: INSERTION, STENT, VESSEL, PERIPHERAL;  Surgeon: Joseph Loyola MD;  Location: [facility] CATH/EP LAB;  Service: Cardiology;  Laterality: N/A;    MITRAL VALVE REPLACEMENT      PARATHYROIDECTOMY      PARATHYROIDECTOMY  07/13/2017    PERCUTANEOUS TRANSLUMINAL ANGIOPLASTY OF ARTERIOVENOUS FISTULA N/A 1/7/2020    Procedure: PTA, AV FISTULA;  Surgeon: Joseph Loyola MD;  Location: [facility] CATH/EP LAB;  Service: Cardiology;  Laterality: N/A;    PERITONEAL CATHETER INSERTION      RENAL BIOPSY      vocal cord nodule      WOUND DEBRIDEMENT Left 7/13/2020    Procedure: DEBRIDEMENT, WOUND;  Surgeon: Carlos Elam III, MD;  Location: [facility] OR;  Service: General;  Laterality: Left;     Family History   Problem Relation Age of Onset    Heart disease Sister     Early death Sister         heart as baby    Heart disease Maternal Grandfather     Diabetes Mother     Hypertension Mother     Heart failure Mother     Heart disease Mother     Arthritis Mother     Diabetes Father     Early death Sister         infant     Social History     Tobacco Use    Smoking status: Current Some Day Smoker     Packs/day: 0.50     Years: 45.00     Pack years: 22.50     Types: Cigarettes    Smokeless tobacco: Never Used   Substance Use Topics    Alcohol use: No    Drug use: No     Review of Systems   Constitutional: Positive for fever. Negative for  chills.   HENT: Negative for ear pain, rhinorrhea and sore throat.    Eyes: Negative for pain and visual disturbance.   Respiratory: Negative for cough and shortness of breath.    Cardiovascular: Negative for chest pain and palpitations.   Gastrointestinal: Negative for abdominal pain, constipation, diarrhea, nausea and vomiting.   Genitourinary: Negative for dysuria, frequency, hematuria and urgency.   Musculoskeletal: Negative for back pain, joint swelling and myalgias.   Skin: Negative for rash.   Neurological: Negative for dizziness, seizures, weakness and headaches.   Psychiatric/Behavioral: Positive for decreased concentration. Negative for dysphoric mood. The patient is not nervous/anxious.        Physical Exam     Initial Vitals [11/15/20 1847]   BP Pulse Resp Temp SpO2   (!) 186/103 (!) 129 (!) 28 (!) 100.6 °F (38.1 °C) (!) 93 %      MAP       --         Physical Exam    Nursing note and vitals reviewed.  Constitutional: She appears well-developed and well-nourished.   HENT:   Head: Normocephalic and atraumatic.   Mouth/Throat: Oropharynx is clear and moist.   Eyes: Conjunctivae, EOM and lids are normal. Pupils are equal, round, and reactive to light.   Neck: Trachea normal and normal range of motion. Neck supple. No thyroid mass and no thyromegaly present.   Cardiovascular: Normal rate, regular rhythm and normal heart sounds.   Pulmonary/Chest: Effort normal.   Patient has rales or rhonchi right lower base.   Abdominal: Soft. Normal appearance and bowel sounds are normal. There is no abdominal tenderness.   Musculoskeletal: Normal range of motion.   Neurological: She is alert and oriented to person, place, and time. She has normal strength and normal reflexes. No cranial nerve deficit or sensory deficit.   Patient not answering questions at the present time.   Skin: Skin is warm and dry.   Psychiatric: Her speech is normal.         ED Course   Procedures  Labs Reviewed - No data to display       Imaging  Results    None          Medical Decision Making:   Initial Assessment:   Chief complaint is fever decreased level consciousness  Differential Diagnosis:   Include sepsis from many or any source.  Including the lung, or intra-abdominal area or urinary tract infection.  Her change in mental status could be electrolyte related.  Could be anemia related.  She denies any trauma.  She may have hemodynamic instability with the AFib as well.  Her change in mental status could be from any intracranial abnormality including stroke or meningitis.  The etiology of her change in mental status could be metabolic as well.  ED Management:  The patient was treated for atrial fibrillation.  She had lab work done as well.  She will be admitted to the hospitalist.                   ED Course as of Nov 17 2359   Sun Nov 15, 2020   2105 Had a ferritin level of 606,    [US]   2105 The patient had a ferritin level of 606, CO2 21, BUN 58, creatinine 8.1, calcium 7.9, albumin 3.0, lactic acid 2.6, C-reactive protein 5.11  procalcitonin 1.99 CBC reveals white count of 21,000 hemoglobin 9.4 platelets 353, chest x-ray shows cardiomegaly no definite infiltrate, comparing x-ray to previous seems to be improving.  The patient was given Zosyn and will add vancomycin as antibiotics to the regimen.  Patient will be admitted and hospitalist was consulted.   MCH: 27.0 [US]      ED Course User Index  [US] Janki Rivas MD            Clinical Impression:       ICD-10-CM ICD-9-CM   1. Tachycardia  R00.0 785.0                                               Janki Rivas MD  11/18/20 0000

## 2020-11-16 NOTE — NURSING
Patient arrived to unit at this time via stretcher. Cardiac monitoring in place. Son at bedside. Patient oriented to room. Call light within reach. RN to complete admit assessment.

## 2020-11-16 NOTE — PROGRESS NOTES
Pharmacist Renal Dose Adjustment Note    Griselda Neely is a 75 y.o. female being treated with the medication Merrem    Patient Data:    Vital Signs (Most Recent):  Temp: (!) 102.4 °F (39.1 °C) (11/15/20 2141)  Pulse: (!) 115 (11/15/20 2200)  Resp: (!) 22 (11/15/20 2200)  BP: 139/83 (11/15/20 2200)  SpO2: 100 % (11/15/20 2200)   Vital Signs (72h Range):  Temp:  [100.6 °F (38.1 °C)-102.4 °F (39.1 °C)]   Pulse:  [108-129]   Resp:  [22-31]   BP: (129-186)/()   SpO2:  [93 %-100 %]      Recent Labs   Lab 11/09/20  1020 11/15/20  1920   CREATININE 6.2* 8.1*     Serum creatinine: 8.1 mg/dL (H) 11/15/20 1920  Estimated creatinine clearance: 5.5 mL/min (A)    Medication:Merrem dose: 1 g frequency Q12H will be changed to medication:Merrem dose:500 mg frequency:Q24H    Pharmacist's Name: Baudilio Downs  Pharmacist's Extension: [phone]

## 2020-11-16 NOTE — H&P
[facility] History & Physical Examination   Patient Name: Griselda Neely  MRN: [id number]  Patient Class: OP- Observation   Admission Date: 11/15/2020  6:50 PM  Length of Stay: 0  Attending Physician: Jhoan Fuentes MD  Primary Care Provider: Kalie Neely MD  Face-to-Face encounter date: 11/15/2020  Code Status: DNR  Chief Complaint: Fever (TMAX (100.6) 30 mins PTA - no meds given PTA. Hx of dialysis MWF (compliant with dialysis); Recently admitted for pnuemonia x 2 within 2 months. Oriented to person.)        Patient information was obtained from patient, past medical records and ER records.   HISTORY OF PRESENT ILLNESS:   History was obtained from the the family present at the bedside and ER physician Sign-out. The patient is unable to provide history due to mental status changes. Patient presents to the ED by her son who reports she is weak and confused. The patient went to [employer] with her son today who reports they had to leave [employer] early because the patient reports she was not feeling well. This afternoon just prior to arrival he checked her temperature and she was noted to have a fever 100.6, so he brought her to the hospital for evaluation. The son reports the patient was recently admitted to the hospital for pneumonia and she presented confused at that time. She was admitted a second time recently for GI bleed. He states he took it upon himself to discontinue the coumadin for repeated admission for GI bleeds.     In the emergency room, patient is febrile 100.6 initially and then 102.4. She is tachycardic and hypertensive. Her oxygen saturation is wnl on her usual home dose of 4L NC. CBC with WBC 21,000, H/H 9.4/31.5. CMP with CO2 21, BuN 58, creat 8.1. BNP >4500 and troponin elevated 0.088. I have reviewed the EKG and it shows A-flutter at 132bpm, nonspecific T wave abnormality. CXR with central pulmonary vascular prominence and tiny right pleural effusion. The  patient is treated with antipyretics, IV antibiotics,  IV diltiazem.     Decision to admit was taken and patient was informed about the plan of care.   REVIEW OF SYSTEMS:   10 Point Review of System was performed and was found to be negative except for that mentioned already in the HPI above.     PAST MEDICAL HISTORY:     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    both legs    CHF (congestive heart failure)     Encounter for blood transfusion 03/2016    Gout     Hemodialysis access, AV graft     Hypertension     Mitral valve regurgitation     Osteoarthritis     Pancreatitis     Peripheral vascular disease     Pneumonia 09/09/2017    Renal failure        PAST SURGICAL HISTORY:     Past Surgical History:   Procedure Laterality Date    ACHILLES TENDON SURGERY Right     ANGIOGRAPHY OF ARTERIOVENOUS SHUNT Right 1/7/2020    Procedure: Fistulogram with Possible Intervention;  Surgeon: Joseph Loyola MD;  Location: [facility] CATH/EP LAB;  Service: Cardiology;  Laterality: Right;    ANGIOGRAPHY OF LOWER EXTREMITY Left 3/18/2020    Procedure: Angiogram Extremity Unilateral;  Surgeon: Ali Khoobehi, MD;  Location: [facility] CATH/EP LAB;  Service: Cardiology;  Laterality: Left;    APPENDECTOMY      CARDIAC CATHETERIZATION  07/03/2017    CHOLECYSTECTOMY      COLONOSCOPY Left 10/4/2020    Procedure: COLONOSCOPY;  Surgeon: Jordan Kilpatrick MD;  Location: [facility];  Service: Endoscopy;  Laterality: Left;    ESOPHAGOGASTRODUODENOSCOPY Left 8/17/2020    Procedure: EGD (ESOPHAGOGASTRODUODENOSCOPY);  Surgeon: Talat Trevizo MD;  Location: [facility];  Service: Endoscopy;  Laterality: Left;    ESOPHAGOGASTRODUODENOSCOPY Left 10/2/2020    Procedure: EGD (ESOPHAGOGASTRODUODENOSCOPY);  Surgeon: Talat Trevizo MD;  Location: [facility];  Service: Endoscopy;  Laterality: Left;    heal surgery Right     HYSTERECTOMY      INSERTION OF STENT INTO PERIPHERAL VESSEL  N/A 1/7/2020    Procedure: INSERTION, STENT, VESSEL, PERIPHERAL;  Surgeon: Joseph Loyola MD;  Location: [facility] CATH/EP LAB;  Service: Cardiology;  Laterality: N/A;    MITRAL VALVE REPLACEMENT      PARATHYROIDECTOMY      PARATHYROIDECTOMY  07/13/2017    PERCUTANEOUS TRANSLUMINAL ANGIOPLASTY OF ARTERIOVENOUS FISTULA N/A 1/7/2020    Procedure: PTA, AV FISTULA;  Surgeon: Joseph Loyola MD;  Location: [facility] CATH/EP LAB;  Service: Cardiology;  Laterality: N/A;    PERITONEAL CATHETER INSERTION      RENAL BIOPSY      vocal cord nodule      WOUND DEBRIDEMENT Left 7/13/2020    Procedure: DEBRIDEMENT, WOUND;  Surgeon: Carlos Elam III, MD;  Location: [facility] OR;  Service: General;  Laterality: Left;       ALLERGIES:   Patient has no known allergies.    FAMILY HISTORY:     Family History   Problem Relation Age of Onset    Heart disease Sister     Early death Sister         heart as baby    Heart disease Maternal Grandfather     Diabetes Mother     Hypertension Mother     Heart failure Mother     Heart disease Mother     Arthritis Mother     Diabetes Father     Early death Sister         infant       SOCIAL HISTORY:     Social History     Tobacco Use    Smoking status: Current Some Day Smoker     Packs/day: 0.50     Years: 45.00     Pack years: 22.50     Types: Cigarettes    Smokeless tobacco: Never Used   Substance Use Topics    Alcohol use: No        Social History     Substance and Sexual Activity   Sexual Activity Not Currently        HOME MEDICATIONS:     Prior to Admission medications    Medication Sig Start Date End Date Taking? Authorizing Provider   amoxicillin-clavulanate 500-125mg (AUGMENTIN) 500-125 mg Tab Take 1 tablet (500 mg total) by mouth every evening. Take AFTER DIALYSIS on dialysis days. for 4 days 11/10/20 11/14/20  Cierra Thomas MD   aspirin (ECOTRIN) 81 MG EC tablet Take 1 tablet (81 mg total) by mouth once daily. 10/7/20 11/7/20  Hola Rubio MD   calcium acetate  "(PHOSLO) 667 mg capsule Take 667 mg by mouth once daily.     Historical Provider   clindamycin (CLEOCIN) 300 MG capsule Take 300 mg by mouth 3 (three) times daily.     Historical Provider   diltiaZEM (DILACOR XR) 240 MG CDCR Take 1 capsule (240 mg total) by mouth once daily. 11/10/20 11/10/21  Cierra Thomas MD   folic acid/vit B complex and C (JENNIFER-KODAK ORAL) Take 1 tablet by mouth once daily.    Historical Provider   isosorbide mononitrate (IMDUR) 30 MG 24 hr tablet Take 30 mg by mouth once daily.    Historical Provider   losartan (COZAAR) 25 MG tablet Take 25 mg by mouth once daily.    Historical Provider   magnesium oxide (MAG-OX) 400 mg (241.3 mg magnesium) tablet Take 1 tablet by mouth once daily  Patient taking differently: Take 400 mg by mouth once daily.  7/23/20   Juan Esparza MD   metoprolol succinate (TOPROL-XL) 25 MG 24 hr tablet Take 1 tablet (25 mg total) by mouth nightly. 9/18/20 11/7/20  Katharina Powell MD   ondansetron (ZOFRAN-ODT) 4 MG TbDL Take 4 mg by mouth every 6 (six) hours as needed.    Historical Provider   oxyCODONE-acetaminophen (PERCOCET) 5-325 mg per tablet Take 1 tablet by mouth every 6 (six) hours as needed for Pain.    Historical Provider   pantoprazole (PROTONIX) 40 MG tablet Take 1 tablet (40 mg total) by mouth once daily. 9/18/20 11/7/20  Katharina Powell MD   warfarin (COUMADIN) 2 MG tablet Take 1 tablet (2 mg total) by mouth Daily. 11/10/20 11/10/21  Cierra Thomas MD   wheelchair An 1 Device by Misc.(Non-Drug; Combo Route) route as needed (needed for transport). 9/8/20   Carlos Elam III, MD   zinc sulfate 220 mg Tab tablet Take 220 mg by mouth every other day.    Historical Provider         PHYSICAL EXAM:   /83   Pulse (!) 115   Temp (!) 102.4 °F (39.1 °C)   Resp (!) 22   Ht 5' 5" (1.651 m)   Wt 59.4 kg (131 lb)   LMP  (LMP Unknown)   SpO2 100%   BMI 21.80 kg/m²   Vitals Reviewed  General appearance: Well-developed, " well-nourished, disheveled, sick appearing  female .  Skin: Calciphylaxis to left inner thigh with dressing CDI. Dark discolored BLE  Neuro: Oriented to self and place. Drowsy, easily aroused. Follows commands. Motor and sensory exams grossly intact. Good tone. Power in all 4 extremities 5/5. Generalized weakness  HENT: Atraumatic head. Moist mucous membranes of oral cavity.  Eyes: Normal extraocular movements. PERRLA  Neck: Supple. No evidence of lymphadenopathy. No thyroidomegaly.  Lungs: Tachypnea. Rhonchi. No wheezing present.   Heart: irregularly irregular. S1 and S2 present with no murmurs/gallop/rub. No pedal edema. No JVD present.   Abdomen: Soft, non-distended, non-tender. No rebound tenderness/guarding. No masses or organomegaly. Bowel sounds are normal. Bladder is not palpable.   Extremities: Dark discoloration to BLE. Capillary refill 2-3 seconds.  Psych/mental status: Agitated mood. Cooperative. Responds appropriately to questions.   EMERGENCY DEPARTMENT LABS AND IMAGING:     Labs Reviewed   CBC W/ AUTO DIFFERENTIAL - Abnormal; Notable for the following components:       Result Value    WBC 21.76 (*)     RBC 3.48 (*)     Hemoglobin 9.4 (*)     Hematocrit 31.5 (*)     MCHC 29.8 (*)     RDW 19.3 (*)     Platelets 353 (*)     Immature Granulocytes 1.0 (*)     Gran # (ANC) 19.9 (*)     Immature Grans (Abs) 0.22 (*)     Gran % 91.6 (*)     Lymph % 4.8 (*)     Mono % 2.4 (*)     Platelet Estimate Increased (*)     All other components within normal limits   COMPREHENSIVE METABOLIC PANEL - Abnormal; Notable for the following components:    CO2 21 (*)     BUN 58 (*)     Creatinine 8.1 (*)     Calcium 7.9 (*)     Albumin 3.0 (*)     Total Bilirubin 1.1 (*)     Anion Gap 19 (*)     eGFR if  5.1 (*)     eGFR if non  4.4 (*)     All other components within normal limits   LACTIC ACID, PLASMA - Abnormal; Notable for the following components:    Lactate (Lactic Acid)  2.6 (*)     All other components within normal limits    Narrative:     Lactic Acid critical result(s) called and verbal readback obtained   from Anamika Rider RN ER.  by TC1 11/15/2020 20:02   PROCALCITONIN - Abnormal; Notable for the following components:    Procalcitonin 1.99 (*)     All other components within normal limits   FERRITIN - Abnormal; Notable for the following components:    Ferritin 606 (*)     All other components within normal limits   C-REACTIVE PROTEIN - Abnormal; Notable for the following components:    CRP 5.11 (*)     All other components within normal limits   CULTURE, BLOOD   CULTURE, BLOOD   SARS-COV-2 RNA AMPLIFICATION, QUAL   LACTATE DEHYDROGENASE   SEDIMENTATION RATE   B-TYPE NATRIURETIC PEPTIDE   TROPONIN I   URINALYSIS, REFLEX TO URINE CULTURE   B-TYPE NATRIURETIC PEPTIDE   TROPONIN I   LACTIC ACID, PLASMA       X-Ray Chest AP Portable   Final Result      Unchanged cardiomegaly, central pulmonary vascular prominence, and tiny right pleural effusion.         Electronically signed by: Leonel Aguilar MD   Date:    11/15/2020   Time:    19:40          ASSESSMENT & PLAN:   Sepsis  Encephalopathy   Pneumonia  ESRD on HD MWF  Anemia chronic disease  Afib with atrial thrombus  Chronic systolic and diastolic HF  GERD    Admit to Cardio B  Mentation improved with improvement of fever. No focal weakness noted or reported  Source of infection likely pneumonia; Procalcitonin 1.9; Covid 19 negative  Unable to give IV fluids per sepsis protocol due to ESRD  IV Merrem/Vanc; Zosyn given in the ED  Blood cultures pending  Sputum culture  No respiratory distress noted; patient tolerating usual 4L  Consult Dr. Ingram for HD management  H/H stable. No signs of acute blood loss; follow levels  Trend trop- 1st 0.088; patient denies chest pain; possibly due to renal insufficiency  Patient in A-fib with RVR on arrival; given diltiazem 10mg IVP x1 in ED with improvement in HR; likely exacerbated by  fever  Restart home diltiazem in the AM  Patient currently not anticoagulation as son has discontinued coumadin  Consult cardiology for recommendations as patient has known atrial thrombus and is in atrial fib and has MVR  Will start heparin gtt  Patient is DNR/DNI per her son; He will allow BiPAP/CPAP if needed      DVT Prophylaxis: Mechanical DVT prophylaxis and will be advised to be as mobile as possible and sit in a chair as tolerated.   ________________________________________________________________________________    Discharge Planning and Disposition: No mobility needs. Ambulating well. Patient will be discharged in 2-3 days  Face-to-Face encounter date: 11/15/2020  Encounter included review of the medical records, interviewing and examining the patient face-to-face, discussion with family and other health care providers including emergency medicine physician, admission orders, interpreting lab/test results and formulating a plan of care.   Medical Decision Making during this encounter was  [_] Low Complexity  [_] Moderate Complexity  [x] High Complexity  _________________________________________________________________________________    INPATIENT LIST OF MEDICATIONS     Current Facility-Administered Medications:     furosemide injection 20 mg, 20 mg, Intravenous, ED 1 Time, Janki Rivas MD    Pharmacy to dose Vancomycin consult, , , Once **AND** vancomycin - pharmacy to dose, , Intravenous, pharmacy to manage frequency, Janki Rivas MD    vancomycin in dextrose 5 % 1 gram/250 mL IVPB 1,000 mg, 1,000 mg, Intravenous, Once, Janki Rivas MD, Last Rate: 166.7 mL/hr at 11/15/20 2142, 1,000 mg at 11/15/20 2142    Current Outpatient Medications:     aspirin (ECOTRIN) 81 MG EC tablet, Take 1 tablet (81 mg total) by mouth once daily., Disp: 30 tablet, Rfl: 0    calcium acetate (PHOSLO) 667 mg capsule, Take 667 mg by mouth once daily. , Disp: , Rfl:     clindamycin (CLEOCIN) 300 MG capsule,  Take 300 mg by mouth 3 (three) times daily. , Disp: , Rfl:     diltiaZEM (DILACOR XR) 240 MG CDCR, Take 1 capsule (240 mg total) by mouth once daily., Disp: 30 capsule, Rfl: 11    folic acid/vit B complex and C (JENNIFER-KODAK ORAL), Take 1 tablet by mouth once daily., Disp: , Rfl:     isosorbide mononitrate (IMDUR) 30 MG 24 hr tablet, Take 30 mg by mouth once daily., Disp: , Rfl:     losartan (COZAAR) 25 MG tablet, Take 25 mg by mouth once daily., Disp: , Rfl:     magnesium oxide (MAG-OX) 400 mg (241.3 mg magnesium) tablet, Take 1 tablet by mouth once daily (Patient taking differently: Take 400 mg by mouth once daily. ), Disp: 30 tablet, Rfl: 0    metoprolol succinate (TOPROL-XL) 25 MG 24 hr tablet, Take 1 tablet (25 mg total) by mouth nightly., Disp: 30 tablet, Rfl: 0    ondansetron (ZOFRAN-ODT) 4 MG TbDL, Take 4 mg by mouth every 6 (six) hours as needed., Disp: , Rfl:     oxyCODONE-acetaminophen (PERCOCET) 5-325 mg per tablet, Take 1 tablet by mouth every 6 (six) hours as needed for Pain., Disp: , Rfl:     pantoprazole (PROTONIX) 40 MG tablet, Take 1 tablet (40 mg total) by mouth once daily., Disp: 90 tablet, Rfl: 0    warfarin (COUMADIN) 2 MG tablet, Take 1 tablet (2 mg total) by mouth Daily., Disp: 30 tablet, Rfl: 3    wheelchair An, 1 Device by Misc.(Non-Drug; Combo Route) route as needed (needed for transport)., Disp: , Rfl: 0    zinc sulfate 220 mg Tab tablet, Take 220 mg by mouth every other day., Disp: , Rfl:       Scheduled Meds:   furosemide (LASIX) IV  20 mg Intravenous ED 1 Time    vancomycin (VANCOCIN) IVPB  1,000 mg Intravenous Once     Continuous Infusions:  PRN Meds:.Pharmacy to dose Vancomycin consult **AND** vancomycin - pharmacy to dose      Rebeca Blair  [facility] Hospitalist  11/15/2020

## 2020-11-16 NOTE — NURSING
Blood glucose level 69 on AM labs. 12.5g D50 given per PRN orders. / Julien notified and states he will put in accucheck orders. Glucose level to be redrawn at 0630.

## 2020-11-17 PROBLEM — R00.0 TACHYCARDIA: Status: ACTIVE | Noted: 2020-11-17

## 2020-11-17 LAB
ALBUMIN SERPL BCP-MCNC: 2.5 G/DL (ref 3.5–5.2)
ALP SERPL-CCNC: 104 U/L (ref 55–135)
ALT SERPL W/O P-5'-P-CCNC: 8 U/L (ref 10–44)
ANION GAP SERPL CALC-SCNC: 14 MMOL/L (ref 8–16)
APTT PPP: 40.4 SEC (ref 23.6–33.3)
AST SERPL-CCNC: 13 U/L (ref 10–40)
BASOPHILS # BLD AUTO: 0.03 K/UL (ref 0–0.2)
BASOPHILS NFR BLD: 0.3 % (ref 0–1.9)
BILIRUB SERPL-MCNC: 0.9 MG/DL (ref 0.1–1)
BUN SERPL-MCNC: 38 MG/DL (ref 8–23)
C DIFF GDH STL QL: POSITIVE
C DIFF TOX A+B STL QL IA: NEGATIVE
C DIFF TOX GENS STL QL NAA+PROBE: NEGATIVE
CALCIUM SERPL-MCNC: 7.8 MG/DL (ref 8.7–10.5)
CHLORIDE SERPL-SCNC: 100 MMOL/L (ref 95–110)
CO2 SERPL-SCNC: 24 MMOL/L (ref 23–29)
CREAT SERPL-MCNC: 6.6 MG/DL (ref 0.5–1.4)
DIFFERENTIAL METHOD: ABNORMAL
EOSINOPHIL # BLD AUTO: 0.1 K/UL (ref 0–0.5)
EOSINOPHIL NFR BLD: 0.8 % (ref 0–8)
ERYTHROCYTE [DISTWIDTH] IN BLOOD BY AUTOMATED COUNT: 18.7 % (ref 11.5–14.5)
EST. GFR  (AFRICAN AMERICAN): 6.6 ML/MIN/1.73 M^2
EST. GFR  (NON AFRICAN AMERICAN): 5.7 ML/MIN/1.73 M^2
GLUCOSE SERPL-MCNC: 106 MG/DL (ref 70–110)
GLUCOSE SERPL-MCNC: 123 MG/DL (ref 70–110)
GLUCOSE SERPL-MCNC: 135 MG/DL (ref 70–110)
GLUCOSE SERPL-MCNC: 136 MG/DL (ref 70–110)
GLUCOSE SERPL-MCNC: 76 MG/DL (ref 70–110)
HCT VFR BLD AUTO: 27.3 % (ref 37–48.5)
HGB BLD-MCNC: 8 G/DL (ref 12–16)
IMM GRANULOCYTES # BLD AUTO: 0.03 K/UL (ref 0–0.04)
IMM GRANULOCYTES NFR BLD AUTO: 0.3 % (ref 0–0.5)
INR PPP: 1.4
LYMPHOCYTES # BLD AUTO: 0.6 K/UL (ref 1–4.8)
LYMPHOCYTES NFR BLD: 5.5 % (ref 18–48)
MAGNESIUM SERPL-MCNC: 1.8 MG/DL (ref 1.6–2.6)
MCH RBC QN AUTO: 26.8 PG (ref 27–31)
MCHC RBC AUTO-ENTMCNC: 29.3 G/DL (ref 32–36)
MCV RBC AUTO: 91 FL (ref 82–98)
MONOCYTES # BLD AUTO: 0.5 K/UL (ref 0.3–1)
MONOCYTES NFR BLD: 4.7 % (ref 4–15)
NEUTROPHILS # BLD AUTO: 9 K/UL (ref 1.8–7.7)
NEUTROPHILS NFR BLD: 88.4 % (ref 38–73)
NRBC BLD-RTO: 0 /100 WBC
PHOSPHATE SERPL-MCNC: 4.3 MG/DL (ref 2.7–4.5)
PLATELET # BLD AUTO: 278 K/UL (ref 150–350)
PMV BLD AUTO: 10.4 FL (ref 9.2–12.9)
POTASSIUM SERPL-SCNC: 3.8 MMOL/L (ref 3.5–5.1)
PROT SERPL-MCNC: 6.8 G/DL (ref 6–8.4)
PROTHROMBIN TIME: 16.4 SEC (ref 10.6–14.8)
RBC # BLD AUTO: 2.99 M/UL (ref 4–5.4)
SODIUM SERPL-SCNC: 138 MMOL/L (ref 136–145)
VANCOMYCIN SERPL-MCNC: 13.4 UG/ML
WBC # BLD AUTO: 10.17 K/UL (ref 3.9–12.7)

## 2020-11-17 PROCEDURE — 94761 N-INVAS EAR/PLS OXIMETRY MLT: CPT

## 2020-11-17 PROCEDURE — 99900031 HC PATIENT EDUCATION (STAT)

## 2020-11-17 PROCEDURE — 25000003 PHARM REV CODE 250: Performed by: NURSE PRACTITIONER

## 2020-11-17 PROCEDURE — 82962 GLUCOSE BLOOD TEST: CPT

## 2020-11-17 PROCEDURE — 63600175 PHARM REV CODE 636 W HCPCS: Performed by: FAMILY MEDICINE

## 2020-11-17 PROCEDURE — 63600175 PHARM REV CODE 636 W HCPCS: Performed by: INTERNAL MEDICINE

## 2020-11-17 PROCEDURE — 87449 NOS EACH ORGANISM AG IA: CPT

## 2020-11-17 PROCEDURE — 25000003 PHARM REV CODE 250: Performed by: STUDENT IN AN ORGANIZED HEALTH CARE EDUCATION/TRAINING PROGRAM

## 2020-11-17 PROCEDURE — 87324 CLOSTRIDIUM AG IA: CPT

## 2020-11-17 PROCEDURE — 25000003 PHARM REV CODE 250: Performed by: FAMILY MEDICINE

## 2020-11-17 PROCEDURE — 27000221 HC OXYGEN, UP TO 24 HOURS

## 2020-11-17 PROCEDURE — 84100 ASSAY OF PHOSPHORUS: CPT

## 2020-11-17 PROCEDURE — 80202 ASSAY OF VANCOMYCIN: CPT

## 2020-11-17 PROCEDURE — 99900035 HC TECH TIME PER 15 MIN (STAT)

## 2020-11-17 PROCEDURE — 83735 ASSAY OF MAGNESIUM: CPT

## 2020-11-17 PROCEDURE — 87493 C DIFF AMPLIFIED PROBE: CPT

## 2020-11-17 PROCEDURE — 80053 COMPREHEN METABOLIC PANEL: CPT

## 2020-11-17 PROCEDURE — 63600175 PHARM REV CODE 636 W HCPCS: Performed by: NURSE PRACTITIONER

## 2020-11-17 PROCEDURE — 85025 COMPLETE CBC W/AUTO DIFF WBC: CPT

## 2020-11-17 PROCEDURE — 85730 THROMBOPLASTIN TIME PARTIAL: CPT

## 2020-11-17 PROCEDURE — 85610 PROTHROMBIN TIME: CPT

## 2020-11-17 PROCEDURE — C9113 INJ PANTOPRAZOLE SODIUM, VIA: HCPCS | Performed by: NURSE PRACTITIONER

## 2020-11-17 PROCEDURE — 21400001 HC TELEMETRY ROOM

## 2020-11-17 RX ORDER — ENOXAPARIN SODIUM 100 MG/ML
1 INJECTION SUBCUTANEOUS
Status: DISCONTINUED | OUTPATIENT
Start: 2020-11-17 | End: 2020-11-17

## 2020-11-17 RX ORDER — VANCOMYCIN HCL IN 5 % DEXTROSE 1G/250ML
1000 PLASTIC BAG, INJECTION (ML) INTRAVENOUS ONCE
Status: COMPLETED | OUTPATIENT
Start: 2020-11-17 | End: 2020-11-17

## 2020-11-17 RX ORDER — ENOXAPARIN SODIUM 100 MG/ML
1 INJECTION SUBCUTANEOUS EVERY 24 HOURS
Status: DISCONTINUED | OUTPATIENT
Start: 2020-11-17 | End: 2020-11-18 | Stop reason: HOSPADM

## 2020-11-17 RX ADMIN — MEROPENEM 500 MG: 1 INJECTION, POWDER, FOR SOLUTION INTRAVENOUS at 01:11

## 2020-11-17 RX ADMIN — METOPROLOL SUCCINATE 12.5 MG: 25 TABLET, EXTENDED RELEASE ORAL at 11:11

## 2020-11-17 RX ADMIN — LOSARTAN POTASSIUM 25 MG: 25 TABLET, FILM COATED ORAL at 08:11

## 2020-11-17 RX ADMIN — DILTIAZEM HYDROCHLORIDE 240 MG: 120 CAPSULE, COATED, EXTENDED RELEASE ORAL at 08:11

## 2020-11-17 RX ADMIN — VANCOMYCIN HYDROCHLORIDE 1000 MG: 1 INJECTION, POWDER, LYOPHILIZED, FOR SOLUTION INTRAVENOUS at 09:11

## 2020-11-17 RX ADMIN — METOPROLOL SUCCINATE 25 MG: 25 TABLET, FILM COATED, EXTENDED RELEASE ORAL at 09:11

## 2020-11-17 RX ADMIN — ISOSORBIDE MONONITRATE 30 MG: 30 TABLET, EXTENDED RELEASE ORAL at 08:11

## 2020-11-17 RX ADMIN — MUPIROCIN: 20 OINTMENT TOPICAL at 08:11

## 2020-11-17 RX ADMIN — PANTOPRAZOLE SODIUM 40 MG: 40 INJECTION, POWDER, FOR SOLUTION INTRAVENOUS at 08:11

## 2020-11-17 RX ADMIN — MAGNESIUM OXIDE 400 MG: 400 TABLET ORAL at 08:11

## 2020-11-17 RX ADMIN — MEROPENEM 500 MG: 1 INJECTION, POWDER, FOR SOLUTION INTRAVENOUS at 11:11

## 2020-11-17 RX ADMIN — ASPIRIN 81 MG: 81 TABLET, DELAYED RELEASE ORAL at 08:11

## 2020-11-17 RX ADMIN — ENOXAPARIN SODIUM 60 MG: 60 INJECTION SUBCUTANEOUS at 03:11

## 2020-11-17 RX ADMIN — CALCIUM ACETATE 667 MG: 667 CAPSULE ORAL at 08:11

## 2020-11-17 NOTE — PROGRESS NOTES
Pharmacokinetic Assessment Follow Up: IV Vancomycin    Vancomycin serum concentration assessment(s):    The random level was drawn correctly and can be used to guide therapy at this time. The measurement is within the desired definitive target range of 10 to 15 mcg/mL.    Vancomycin Regimen Plan:    Administer Vancomycin 1000 mg IV once with next serum random concentration measured at 04:00 on 11/19    Drug levels (last 3 results):  Recent Labs   Lab Result Units 11/16/20 2034 11/17/20  1143   Vancomycin, Random ug/mL 15.8 13.4       Pharmacy will continue to follow and monitor vancomycin.    Please contact pharmacy at extension [phone] for questions regarding this assessment.    Thank you for the consult,   Katlyn Downs       Patient brief summary:  Griselda Neely is a 75 y.o. female initiated on antimicrobial therapy with IV Vancomycin for treatment of  pneumonia    The patient's current regimen is intermittent dosing to follow random labs    Drug Allergies:   Review of patient's allergies indicates:  No Known Allergies    Actual Body Weight:   60.8 kg    Renal Function:   Estimated Creatinine Clearance: 6.7 mL/min (A) (based on SCr of 6.6 mg/dL (H)).,     Dialysis Method (if applicable):  intermittent HD    CBC (last 72 hours):  Recent Labs   Lab Result Units 11/15/20  1920 11/16/20  0430 11/17/20  1143   WBC K/uL 21.76* 22.82* 10.17   Hemoglobin g/dL 9.4* 9.0* 8.0*   Hematocrit % 31.5* 29.3* 27.3*   Platelets K/uL 353* 304 278   Gran % % 91.6* 90.5* 88.4*   Lymph % % 4.8* 5.7* 5.5*   Mono % % 2.4* 2.1* 4.7   Eosinophil % % 0.0 0.0 0.8   Basophil % % 0.2 0.2 0.3   Differential Method  Automated Automated Automated       Metabolic Panel (last 72 hours):  Recent Labs   Lab Result Units 11/15/20  1920 11/16/20  0430 11/16/20  1118 11/17/20  1143   Sodium mmol/L 136 138  --  138   Potassium mmol/L 4.9 5.0  --  3.8   Chloride mmol/L 96 100  --  100   CO2 mmol/L 21* 23  --  24   Glucose mg/dL 83 69* 75 106   BUN mg/dL 58*  65*  --  38*   Creatinine mg/dL 8.1* 8.8*  --  6.6*   Albumin g/dL 3.0* 2.7*  --  2.5*   Total Bilirubin mg/dL 1.1* 1.1*  --  0.9   Alkaline Phosphatase U/L 135 127  --  104   AST U/L 24 20  --  13   ALT U/L 12 11  --  8*   Magnesium mg/dL  --  1.8  --  1.8   Phosphorus mg/dL  --  4.7*  --  4.3       Vancomycin Administrations:  vancomycin given in the last 96 hours                     vancomycin in dextrose 5 % 1 gram/250 mL IVPB 1,000 mg (mg) 1,000 mg New Bag 11/15/20 2142                    Microbiologic Results:  Microbiology Results (last 7 days)       Procedure Component Value Units Date/Time    C Diff Toxin by PCR [[id number]] Collected: 11/17/20 0330    Order Status: Completed Updated: 11/17/20 0658     C. diff PCR Negative    Clostridium difficile EIA [[id number]]  (Abnormal) Collected: 11/17/20 0330    Order Status: Completed Specimen: Stool Updated: 11/17/20 0543     C. diff Antigen Positive     Comment: CDIFF result(s) called and verbal readback obtained from TYRONE Fonseca. by [facility] 11/17/2020 05:42          C difficile Toxins A+B, EIA Negative     Comment: Testing not recommended for children <24 months old.       Narrative:      CDIFF result(s) called and verbal readback obtained from TYRONE Fonseca. by [facility] 11/17/2020 05:42    Blood culture x two cultures. Draw prior to antibiotics. [[id number]] Collected: 11/15/20 1935    Order Status: Completed Specimen: Blood from Peripheral, Hand, Left Updated: 11/16/20 2032     Blood Culture, Routine No Growth to date      No Growth to date    Narrative:      Aerobic and anaerobic    Blood culture x two cultures. Draw prior to antibiotics. [[id number]] Collected: 11/15/20 1920    Order Status: Completed Specimen: Blood from Peripheral, Wrist, Left Updated: 11/16/20 2032     Blood Culture, Routine No Growth to date      No Growth to date    Narrative:      Aerobic and anaerobic    Culture, Respiratory with Gram Stain [[id number]]     Order Status:  Sent Specimen: Respiratory from Sputum

## 2020-11-17 NOTE — NURSING
Pt required labs for heparin gtt and AM labs. Pt refused for labs to be drawn. Dr. Victoria notified that heparin gtt cannot be started without a new ptt since pt removed the IV access that we initially had. Will continue to monitor pt.

## 2020-11-17 NOTE — PROGRESS NOTES
Assessment completed at bedside with Pt.  She is , has 1 adult child, and has not addressed POA / AD.  Her PCP is Dr. Neely, and she uses [facility] on [address]  She confirmed insurance, and has [employer] Medicare.  Plan for discharge at this time is home with HH, with assistance from family as needed. Case Management to continue to follow.    ISAAC Roach Student  Case Management  Ext. 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    Little Burks LCSW  Case Management  Ext. 1938 11/17/20 1421   Discharge Assessment   Assessment Type Discharge Planning Assessment   Confirmed/corrected address and phone number on facesheet? Yes   Assessment information obtained from? Patient;Medical Record   Communicated expected length of stay with patient/caregiver no   Prior to hospitilization cognitive status: Alert/Oriented   Prior to hospitalization functional status: Independent   Current cognitive status: Alert/Oriented   Current Functional Status: Independent   Facility Arrived From: Home   Lives With sibling(s)   Able to Return to Prior Arrangements yes   Is patient able to care for self after discharge? Yes   Who are your caregiver(s) and their phone number(s)? (Sister) Aide Watson [phone] (Sister) Elvi Hi [phone]   Patient's perception of discharge disposition home or selfcare   Readmission Within the Last 30 Days current reason for admission unrelated to previous admission   If yes, most recent facility name: [facility]   Patient currently being followed by outpatient case management? No   Patient currently receives any other outside agency services? No   How many hours a day does the patient receive services?   (Pt reports everyday but no particular time)   Name and contact number of agency or person providing outside services Home health   Is it the patient/care giver preference to resume care with the current outside agency? Yes    Equipment Currently Used at Home wheelchair;cane, straight   Part D Coverage n/a   Do you have any problems affording any of your prescribed medications? No   Is the patient taking medications as prescribed? yes   Does the patient have transportation home? Yes   Transportation Anticipated family or friend will provide   Dialysis Name and Scheduled days [facility] on [facility]  [id number]   Does the patient receive services at the [facility]? No   Discharge Plan A Home;Home with family;Home Health   Discharge Plan B Home Health;Home;Home with family   DME Needed Upon Discharge  wheelchair   Patient/Family in Agreement with Plan yes   Readmission Questionnaire   At the time of your discharge, did someone talk to you about what your health problems were? Yes   At the time of discharge, did someone talk to you about what to watch out for regarding worsening of your health problem? Yes   At the time of discharge, did someone talk to you about what to do if you experienced worsening of your health problem? Yes   At the time of discharge, did someone talk to you about which medication to take when you left the hospital and which ones to stop taking? Yes   What do you believe caused you to be sick enough to be re-admitted? Pt reports a fever   How often do you need to have someone help you when you read instructions, pamphlets, or other written material from your doctor or pharmacy? Never   Do you have problems taking your medications as prescribed? No   Do you have any problems affording any of  your prescribed medications? No   Do you have problems obtaining/receiving your medications? No   Does the patient have transportation to healthcare appointments? Yes   Living Arrangements house   Does the patient have family/friends to help with healtcare needs after discharge? yes   Does your caregiver provide all the help you need? Yes   Are you currently feeling confused? No   Are you currently having problems  "thinking? No   Are you currently having memory problems? Yes  (Pt reports "sometimes")   Have you felt down, depressed, or hopeless? 0   Have you felt little interest or pleasure in doing things? 1   In the last 7 days, my sleep quality was: fair     "

## 2020-11-17 NOTE — PLAN OF CARE
Problem: Fall Injury Risk  Goal: Absence of Fall and Fall-Related Injury  Outcome: Ongoing, Progressing     Problem: Adult Inpatient Plan of Care  Goal: Plan of Care Review  Outcome: Ongoing, Progressing  Goal: Patient-Specific Goal (Individualization)  Outcome: Ongoing, Progressing  Goal: Absence of Hospital-Acquired Illness or Injury  Outcome: Ongoing, Progressing  Goal: Optimal Comfort and Wellbeing  Outcome: Ongoing, Progressing  Goal: Readiness for Transition of Care  Outcome: Ongoing, Progressing  Goal: Rounds/Family Conference  Outcome: Ongoing, Progressing     Problem: Wound  Goal: Optimal Wound Healing  Outcome: Ongoing, Progressing     Problem: Skin Injury Risk Increased  Goal: Skin Health and Integrity  Outcome: Ongoing, Progressing     Problem: Infection  Goal: Infection Symptom Resolution  Outcome: Ongoing, Progressing     Problem: Device-Related Complication Risk (Hemodialysis)  Goal: Safe, Effective Therapy Delivery  Outcome: Ongoing, Progressing     Problem: Hemodynamic Instability (Hemodialysis)  Goal: Vital Signs Remain in Desired Range  Outcome: Ongoing, Progressing     Problem: Infection (Hemodialysis)  Goal: Absence of Infection Signs/Symptoms  Outcome: Ongoing, Progressing     Problem: Fluid Imbalance (Pneumonia)  Goal: Fluid Balance  Outcome: Ongoing, Progressing     Problem: Infection (Pneumonia)  Goal: Resolution of Infection Signs/Symptoms  Outcome: Ongoing, Progressing     Problem: Respiratory Compromise (Pneumonia)  Goal: Effective Oxygenation and Ventilation  Outcome: Ongoing, Progressing

## 2020-11-17 NOTE — PLAN OF CARE
Important Message from Medicare was sign, explained and given to patient/caregiver on 11/17/2020 at 2:55pm     answered all questions.

## 2020-11-17 NOTE — HOSPITAL COURSE
HPI: History was obtained from the the family present at the bedside and ER physician Sign-out. The patient is unable to provide history due to mental status changes. Patient presents to the ED by her son who reports she is weak and confused. The patient went to [employer] with her son today who reports they had to leave [employer] early because the patient reports she was not feeling well. This afternoon just prior to arrival he checked her temperature and she was noted to have a fever 100.6, so he brought her to the hospital for evaluation. The son reports the patient was recently admitted to the hospital for pneumonia and she presented confused at that time. She was admitted a second time recently for GI bleed. He states he took it upon himself to discontinue the coumadin for repeated admission for GI bleeds.   In the emergency room, patient is febrile 100.6 initially and then 102.4. She is tachycardic and hypertensive. Her oxygen saturation is wnl on her usual home dose of 4L NC. CBC with WBC 21,000, H/H 9.4/31.5. CMP with CO2 21, BuN 58, creat 8.1. BNP >4500 and troponin elevated 0.088. I have reviewed the EKG and it shows A-flutter at 132bpm, nonspecific T wave abnormality. CXR with central pulmonary vascular prominence and tiny right pleural effusion. The patient is treated with antipyretics, IV antibiotics,  IV diltiazem.      Handoff summary: [facility] care 11/16/20 only  11/16:  Patient stable and AOx3.  Patient currently being treated for pneumonia which pro calcitonin elevated at 1.99. Patient continued on IV Zosyn and vancomycin, sputum and blood cultures pending.  Patient to have dialysis today.  Cardiology consulted for recommendations as patient has known atrial thrombus and is in atrial fib and has MVR.  Patient was placed on heparin drip pending cardiac consultation

## 2020-11-17 NOTE — PROGRESS NOTES
[facility] Medicine  Progress Note    Patient Name: Griselda Neely  MRN: [id number]  Patient Class: IP- Inpatient   Admission Date: 11/15/2020  Length of Stay: 0 days  Attending Physician: Maris Chamorro DO  Primary Care Provider: Kalie Neely MD        Subjective:     Principal Problem:<principal problem not specified>  Chief Complaint   Patient presents with    Fever     TMAX (100.6) 30 mins PTA - no meds given PTA. Hx of dialysis MWF (compliant with dialysis); Recently admitted for pnuemonia x 2 within 2 months. Oriented to person.       Interval History:   Assumed care 11/17  tmax 100.4 on 11/16 @ 1940. Telemetry reviewed Afib with rate 110's.  cdiff antigen positive, toxin and pcr negative. Patient reports 1 bm yesterday and 1 bm today.  (3 bm's recorded per chart review).  Overnight patient refused blood draws for aPTT so heparin gtt was discontinued and full dose lovenox started.     Patient seen and examined. States that she feel ok. She is now agreeable to blood draw x 1 (cbc, cmp, mg, phos ordered). Denies chest pain, palpations, sob, cough, ab pain, n/n, f/c.      Review of Systems   As above  Objective:     Vital Signs (Most Recent):  Temp: 98.2 °F (36.8 °C) (11/17/20 0734)  Pulse: (!) 115 (11/17/20 0734)  Resp: 20 (11/17/20 0734)  BP: 127/72 (11/17/20 0734)  SpO2: 100 % (11/17/20 0734) Vital Signs (24h Range):  Temp:  [32 °F (0 °C)-100.4 °F (38 °C)] 98.2 °F (36.8 °C)  Pulse:  [] 115  Resp:  [15-20] 20  SpO2:  [97 %-100 %] 100 %  BP: (127-186)/(65-93) 127/72     Weight: 60.8 kg (134 lb 0.6 oz)  Body mass index is 22.31 kg/m².    Intake/Output Summary (Last 24 hours) at 11/17/2020 1053  Last data filed at 11/17/2020 0400  Gross per 24 hour   Intake 957.25 ml   Output 3500 ml   Net -2542.75 ml      Physical Exam  Vitals signs and nursing note reviewed.   Constitutional:       General: She is not in acute distress.     Appearance: She is well-developed. She is not  toxic-appearing.      Comments: Frail, eldery   HENT:      Head: Atraumatic.      Nose:      Comments: Nasal canula  Eyes:      Conjunctiva/sclera: Conjunctivae normal.      Pupils: Pupils are equal, round, and reactive to light.   Neck:      Musculoskeletal: Normal range of motion and neck supple.      Thyroid: No thyromegaly.      Vascular: No JVD.   Cardiovascular:      Rate and Rhythm: Tachycardia present. Rhythm irregular.      Heart sounds: Normal heart sounds. No murmur. No friction rub. No gallop.    Pulmonary:      Effort: Pulmonary effort is normal.      Breath sounds: Normal breath sounds. No wheezing or rales.   Abdominal:      General: Bowel sounds are normal. There is no distension.      Palpations: Abdomen is soft.      Tenderness: There is no abdominal tenderness.   Musculoskeletal: Normal range of motion.         General: No tenderness.      Right lower leg: No edema.      Left lower leg: No edema.   Skin:     General: Skin is warm and dry.   Neurological:      Mental Status: She is alert and oriented to person, place, and time.      Deep Tendon Reflexes: Reflexes are normal and symmetric.   Psychiatric:         Behavior: Behavior normal.         Significant Labs:   CBC:   Recent Labs   Lab 11/15/20  1920 11/16/20  0430   WBC 21.76* 22.82*   HGB 9.4* 9.0*   HCT 31.5* 29.3*   * 304     CMP:   Recent Labs   Lab 11/15/20  1920 11/16/20  0430 11/16/20  1118    138  --    K 4.9 5.0  --    CL 96 100  --    CO2 21* 23  --    GLU 83 69* 75   BUN 58* 65*  --    CREATININE 8.1* 8.8*  --    CALCIUM 7.9* 7.9*  --    PROT 8.1 7.9  --    ALBUMIN 3.0* 2.7*  --    BILITOT 1.1* 1.1*  --    ALKPHOS 135 127  --    AST 24 20  --    ALT 12 11  --    ANIONGAP 19* 15  --    EGFRNONAA 4.4* 4.0*  --      All pertinent labs within the past 24 hours have been reviewed.    Significant Imaging: no new images today    Microbiology Results (last 7 days)     Procedure Component Value Units Date/Time    C Diff Toxin by  PCR [[id number]] Collected: 11/17/20 0330    Order Status: Completed Updated: 11/17/20 0658     C. diff PCR Negative    Clostridium difficile EIA [[id number]]  (Abnormal) Collected: 11/17/20 0330    Order Status: Completed Specimen: Stool Updated: 11/17/20 0543     C. diff Antigen Positive     Comment: CDIFF result(s) called and verbal readback obtained from TYRONE Fonseca. by TC 11/17/2020 05:42          C difficile Toxins A+B, EIA Negative     Comment: Testing not recommended for children <24 months old.       Narrative:      CDIFF result(s) called and verbal readback obtained from TYRONE Fonseca. by TC 11/17/2020 05:42    Blood culture x two cultures. Draw prior to antibiotics. [[id number]] Collected: 11/15/20 1935    Order Status: Completed Specimen: Blood from Peripheral, Hand, Left Updated: 11/16/20 2032     Blood Culture, Routine No Growth to date      No Growth to date    Narrative:      Aerobic and anaerobic    Blood culture x two cultures. Draw prior to antibiotics. [[id number]] Collected: 11/15/20 1920    Order Status: Completed Specimen: Blood from Peripheral, Wrist, Left Updated: 11/16/20 2032     Blood Culture, Routine No Growth to date      No Growth to date    Narrative:      Aerobic and anaerobic    Culture, Respiratory with Gram Stain [[id number]]     Order Status: Sent Specimen: Respiratory from Sputum           Assessment/Plan:      Active Hospital Problems    Diagnosis  POA    *Pneumonia [J18.9]  Yes    Tachycardia [R00.0]  Yes    Subtherapeutic anticoagulation [Z51.81, Z79.01]  Not Applicable    Fever [R50.9]  Yes    Chronic systolic heart failure [I50.22]  Yes    DNR (do not resuscitate) [Z66]  Yes     Chronic    Anemia due to chronic kidney disease [N18.9, D63.1]  Yes     Likely of chronic disease.       Other persistent atrial fibrillation [I48.19]  Yes    ESRD (end stage renal disease) on HD M,W, F [N18.6]  Yes     Chronic    HTN (hypertension) [I10]  Yes      Chronic      Resolved Hospital Problems   No resolved problems to display.           Pneumonia   - sputum culture, blood culture pending : ngtd  - continue IV Zosyn and vancomycin  - continue to monitor  - supportive care  - supplemental o2 (on home o2)       Chronic systolic heart failure  - compensated  - continue home medication of isosorbide mononitrate 30 mg daily     Subtherapeutic INR  - INR 1.5  - patient not taking Coumadin anticoagulation on hold due to multiple GI bleeds  - cardiac consultation for evaluation of atrial thrombosis  - full dose lovenox fornow      Anemia due to chronic kidney disease  - stable  - continue to monitor      ESRD (end stage renal disease) on HD M,W, F  - nephrology consulted  - last hd 11/16      HTN (hypertension)  AFIB  - resume home medication of losartan 25 mg daily, Toprol XL 25 mg daily  - telemetry reviewed afib with rate 110's  - will add extra toprol 12.5 mg this AM  - heparin gtt stopped as patient was declining lab draws  c/w full dose lovenox  - cardiology consulted           VTE Risk Mitigation (From admission, onward)         Ordered     enoxaparin injection 60 mg  Every 24 hours      11/17/20 0206     IP VTE HIGH RISK PATIENT  Once      11/15/20 2234     Place sequential compression device  Until discontinued      11/15/20 2234     Reason for No Pharmacological VTE Prophylaxis  Once     Question:  Reasons:  Answer:  Risk of Bleeding    11/15/20 2234                Discharge Planning   SIENNA:      Code Status: DNR   Is the patient medically ready for discharge?:     Reason for patient still in hospital (select all that apply): Treatment and Consult recommendations             Maris Chamorro DO  Department of Hospital Medicine   [facility]

## 2020-11-17 NOTE — CARE UPDATE
11/16/20 2010   Patient Assessment/Suction   Level of Consciousness (AVPU) alert   Respiratory Effort Unlabored   Expansion/Accessory Muscles/Retractions no use of accessory muscles   All Lung Fields Breath Sounds clear;diminished   Rhythm/Pattern, Respiratory no shortness of breath reported   Cough Frequency no cough   PRE-TX-O2   O2 Device (Oxygen Therapy) nasal cannula   Flow (L/min) 4   SpO2 97 %   Pulse Oximetry Type Continuous   $ Pulse Oximetry - Multiple Charge Pulse Oximetry - Multiple   Pulse (!) 115   Resp 15   Positioning   Head of Bed (HOB) HOB elevated   Respiratory Evaluation   $ Care Plan Tech Time 15 min

## 2020-11-17 NOTE — NURSING
"Pt refused AM labs and stated "I have been stuck enough and that is why I can't use my arm anymore." Notified Dr. Victoria. Will continue to monitor pt.   "

## 2020-11-17 NOTE — PROGRESS NOTES
Replaced by [facility]  Nephrology  Progress Note    Patient Name: Griselda Neely  MRN: [id number]  Admission Date: 11/15/2020  Hospital Length of Stay: 0 days  Attending Provider: Maris Chamorro DO   Primary Care Physician: Kalie Neely MD  Principal Problem:Pneumonia    Consults  Subjective:     Interval History:   Sitting up in bed eating lunch  Feels better  No acute events overnight    Review of patient's allergies indicates:  No Known Allergies  Current Facility-Administered Medications   Medication Frequency    0.9%  NaCl infusion PRN    0.9%  NaCl infusion Once    acetaminophen suppository 650 mg Q4H PRN    aspirin EC tablet 81 mg Daily    calcium acetate(phosphat bind) capsule 667 mg Daily    dextrose 50% injection 12.5 g PRN    dextrose 50% injection 25 g PRN    diltiaZEM 24 hr capsule 240 mg Daily    enoxaparin injection 60 mg Q24H    glucagon (human recombinant) injection 1 mg PRN    glucose chewable tablet 16 g PRN    glucose chewable tablet 24 g PRN    insulin regular injection 1-6 Units PRN    isosorbide mononitrate 24 hr tablet 30 mg Daily    losartan tablet 25 mg Daily    magnesium oxide tablet 400 mg Daily    meropenem (MERREM) 500 mg in sodium chloride 0.9% 100 mL IVPB Q24H    metoprolol succinate (TOPROL-XL) 24 hr tablet 25 mg Nightly    mupirocin 2 % ointment BID    ondansetron injection 4 mg Q6H PRN    pantoprazole injection 40 mg Daily    sodium chloride 0.9% flush 10 mL PRN    sodium thiosulfate 12.5 gram/50 mL (250 mg/mL) injection 25 g Every Mon, Wed, Fri    vancomycin - pharmacy to dose pharmacy to manage frequency    vancomycin in dextrose 5 % 1 gram/250 mL IVPB 1,000 mg Once       Objective:     Vital Signs (Most Recent):  Temp: 98.6 °F (37 °C) (11/17/20 1529)  Pulse: 104 (11/17/20 1529)  Resp: 18 (11/17/20 1529)  BP: 134/73 (11/17/20 1529)  SpO2: 99 % (11/17/20 1529)  O2 Device (Oxygen Therapy): nasal cannula (11/17/20 1529) Vital Signs (24h  Range):  Temp:  [32 °F (0 °C)-100.4 °F (38 °C)] 98.6 °F (37 °C)  Pulse:  [104-123] 104  Resp:  [15-20] 18  SpO2:  [97 %-100 %] 99 %  BP: (127-158)/(65-92) 134/73     Weight: 60.8 kg (134 lb 0.6 oz) (11/17/20 0401)  Body mass index is 22.31 kg/m².  Body surface area is 1.67 meters squared.    I/O last 3 completed shifts:  In: 1143.5 [P.O.:220; I.V.:173.5; Other:500; IV Piggyback:250]  Out: 3500 [Other:3500]    Physical Exam     Physical Exam  Vitals signs and nursing note reviewed.   Constitutional:       Appearance: Normal appearance.   HENT:      Head: Normocephalic.      Nose: Nose normal.      Mouth/Throat:      Mouth: Mucous membranes are moist.   Eyes:      Pupils: Pupils are equal, round, and reactive to light.   Neck:      Musculoskeletal: Normal range of motion and neck supple.      Comments: No JVD  Cardiovascular:      Rate and Rhythm: Tachycardia present. Rhythm irregular.   Pulmonary:      Effort: Pulmonary effort is normal.      Breath sounds: Normal breath sounds.   Abdominal:      General: Bowel sounds are normal. There is no distension.      Palpations: Abdomen is soft.   Musculoskeletal:      Right lower leg: No edema.      Left lower leg: No edema.   Skin:     General: Skin is warm and dry.   Neurological:      Mental Status: She is alert and oriented to person, place, and time.   Psychiatric:         Mood and Affect: Mood normal.         Behavior: Behavior normal.        Significant Labs:sure  CBC:   Recent Labs   Lab 11/17/20  1143   WBC 10.17   RBC 2.99*   HGB 8.0*   HCT 27.3*      MCV 91   MCH 26.8*   MCHC 29.3*     CMP:   Recent Labs   Lab 11/17/20  1143      CALCIUM 7.8*   ALBUMIN 2.5*   PROT 6.8      K 3.8   CO2 24      BUN 38*   CREATININE 6.6*   ALKPHOS 104   ALT 8*   AST 13   BILITOT 0.9     All labs within the past 24 hours have been reviewed.    Significant Imaging:      Assessment/Plan:     Active Diagnoses:    Diagnosis Date Noted POA    PRINCIPAL PROBLEM:   Pneumonia [J18.9] 11/16/2020 Yes    Tachycardia [R00.0] 11/17/2020 Yes    Subtherapeutic anticoagulation [Z51.81, Z79.01] 11/16/2020 Not Applicable    Fever [R50.9] 11/15/2020 Yes    Chronic systolic heart failure [I50.22] 02/03/2020 Yes    DNR (do not resuscitate) [Z66] 09/30/2019 Yes     Chronic    Anemia due to chronic kidney disease [N18.9, D63.1] 07/25/2017 Yes    Other persistent atrial fibrillation [I48.19] 07/18/2017 Yes    ESRD (end stage renal disease) on HD M,W, F [N18.6] 05/11/2016 Yes     Chronic    HTN (hypertension) [I10] 08/15/2013 Yes     Chronic      Problems Resolved During this Admission:     Assessment and plan:     Pneumonia:  -await sputum and blood culture.  -antibiotics HM     ESRD:  -M-W-F schedule  -renal chemistries stable  -clinically improved  -negative fluid balance 950/3500  -sodium thiosulfate for calciphylaxis  -avoid nephrotoxins, strict I&O, and daily weight     Renal BMD:  -Ca corrects for hypoalbuminemia  -Phos ok     Hypertension:  -okay for now  -continue home meds  -monitor closely     Anemia:  -under goal; stable  -transfuse for hemoglobin less than 7  -monitor closely     Chronic systolic heart failure:  -compensated  -chronically elevated BNP  -continue home meds     Subtherapeutic INR:  -refused blood draws; heparin discontinued  -Lovenox started      Gabe Barksdale NP  Nephrology  [facility]

## 2020-11-17 NOTE — CONSULTS
[facility]  Nephrology  Consult Note    Patient Name: Griselda Neely  MRN: [id number]  Admission Date: 11/15/2020  Hospital Length of Stay: 0 days  Attending Provider: Andree Monsivais MD   Primary Care Physician: Kalie Neely MD  Principal Problem:<principal problem not specified>    Consults  Subjective:     HPI:  Griselda Neely is a 71-year-old female who arrived to [facility] by son for evaluation of weakness, confusion, and fever that started prior to arrival.  PMHx including: anemia, atrial fibrillation, CHF, hypertension, PVD, ESRD, and calciphylaxis.  Recent hospitalizations for pneumonia and twice for GI bleed.  Apparently, her son took her off Coumadin because of recurrent GI bleed.  Nephrology consulted for ESRD management.  She attends [facility]F schedule.  She is well-known to our practice and her nephrologist is Dr. Ingram.    Antibiotics were started for the treatment of pneumonia with sputum and blood cultures pending.  IV heparin infusion until cardiology consult.    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    both legs    CHF (congestive heart failure)     Encounter for blood transfusion 03/2016    Gout     Hemodialysis access, AV graft     Hypertension     Mitral valve regurgitation     Osteoarthritis     Pancreatitis     Peripheral vascular disease     Pneumonia 09/09/2017    Renal failure        Past Surgical History:   Procedure Laterality Date    ACHILLES TENDON SURGERY Right     ANGIOGRAPHY OF ARTERIOVENOUS SHUNT Right 1/7/2020    Procedure: Fistulogram with Possible Intervention;  Surgeon: Joseph Loyola MD;  Location: [facility] CATH/EP LAB;  Service: Cardiology;  Laterality: Right;    ANGIOGRAPHY OF LOWER EXTREMITY Left 3/18/2020    Procedure: Angiogram Extremity Unilateral;  Surgeon: Ali Khoobehi, MD;  Location: [facility] CATH/EP LAB;  Service: Cardiology;  Laterality: Left;    APPENDECTOMY       CARDIAC CATHETERIZATION  07/03/2017    CHOLECYSTECTOMY      COLONOSCOPY Left 10/4/2020    Procedure: COLONOSCOPY;  Surgeon: Jordan Kilpatrick MD;  Location: [facility] ENDO;  Service: Endoscopy;  Laterality: Left;    ESOPHAGOGASTRODUODENOSCOPY Left 8/17/2020    Procedure: EGD (ESOPHAGOGASTRODUODENOSCOPY);  Surgeon: Talat Trevizo MD;  Location: [facility] ENDO;  Service: Endoscopy;  Laterality: Left;    ESOPHAGOGASTRODUODENOSCOPY Left 10/2/2020    Procedure: EGD (ESOPHAGOGASTRODUODENOSCOPY);  Surgeon: Talat Trevizo MD;  Location: [facility] ENDO;  Service: Endoscopy;  Laterality: Left;    heal surgery Right     HYSTERECTOMY      INSERTION OF STENT INTO PERIPHERAL VESSEL N/A 1/7/2020    Procedure: INSERTION, STENT, VESSEL, PERIPHERAL;  Surgeon: Joseph Loyola MD;  Location: [facility] CATH/EP LAB;  Service: Cardiology;  Laterality: N/A;    MITRAL VALVE REPLACEMENT      PARATHYROIDECTOMY      PARATHYROIDECTOMY  07/13/2017    PERCUTANEOUS TRANSLUMINAL ANGIOPLASTY OF ARTERIOVENOUS FISTULA N/A 1/7/2020    Procedure: PTA, AV FISTULA;  Surgeon: Joseph Loyola MD;  Location: [facility] CATH/EP LAB;  Service: Cardiology;  Laterality: N/A;    PERITONEAL CATHETER INSERTION      RENAL BIOPSY      vocal cord nodule      WOUND DEBRIDEMENT Left 7/13/2020    Procedure: DEBRIDEMENT, WOUND;  Surgeon: Carlos Elam III, MD;  Location: [facility] OR;  Service: General;  Laterality: Left;       Review of patient's allergies indicates:  No Known Allergies  Current Facility-Administered Medications   Medication Frequency    0.9%  NaCl infusion PRN    0.9%  NaCl infusion Once    acetaminophen suppository 650 mg Q4H PRN    aspirin EC tablet 81 mg Daily    calcium acetate(phosphat bind) capsule 667 mg Daily    dextrose 50% injection 12.5 g PRN    dextrose 50% injection 25 g PRN    diltiaZEM 24 hr capsule 240 mg Daily    glucagon (human recombinant) injection 1 mg PRN    glucose chewable tablet 16 g PRN    glucose chewable tablet 24  g PRN    heparin 25,000 units in dextrose 5% (100 units/ml) IV bolus from bag - ADDITIONAL PRN BOLUS - 30 units/kg PRN    heparin 25,000 units in dextrose 5% (100 units/ml) IV bolus from bag - ADDITIONAL PRN BOLUS - 60 units/kg PRN    heparin 25,000 units in dextrose 5% 250 mL (100 units/mL) infusion LOW INTENSITY nomogram - [facility] Continuous    insulin regular injection 1-6 Units PRN    isosorbide mononitrate 24 hr tablet 30 mg Daily    losartan tablet 25 mg Daily    magnesium oxide tablet 400 mg Daily    meropenem (MERREM) 500 mg in sodium chloride 0.9% 100 mL IVPB Q24H    metoprolol succinate (TOPROL-XL) 24 hr tablet 25 mg Nightly    mupirocin 2 % ointment BID    ondansetron injection 4 mg Q6H PRN    pantoprazole injection 40 mg Daily    sodium chloride 0.9% flush 10 mL PRN    sodium thiosulfate 12.5 gram/50 mL (250 mg/mL) injection 25 g Every Mon, Wed, Fri    vancomycin - pharmacy to dose pharmacy to manage frequency     Family History     Problem Relation (Age of Onset)    Arthritis Mother    Diabetes Mother, Father    Early death Sister, Sister    Heart disease Sister, Maternal Grandfather, Mother    Heart failure Mother    Hypertension Mother        Tobacco Use    Smoking status: Current Some Day Smoker     Packs/day: 0.50     Years: 45.00     Pack years: 22.50     Types: Cigarettes    Smokeless tobacco: Never Used   Substance and Sexual Activity    Alcohol use: No    Drug use: No    Sexual activity: Not Currently     Review of Systems   Constitutional: Negative for chills and fever.   HENT: Negative.    Respiratory: Negative.    Cardiovascular: Negative.    Gastrointestinal: Negative.    Genitourinary: Negative.    Musculoskeletal: Negative.    Skin: Negative.      Objective:     Vital Signs (Most Recent):  Temp: (!) 100.4 °F (38 °C) (11/16/20 1940)  Pulse: (!) 123 (11/16/20 1940)  Resp: 16 (11/16/20 1940)  BP: 135/75 (11/16/20 1940)  SpO2: 98 % (11/16/20 1940)  O2 Device (Oxygen Therapy):  nasal cannula (11/16/20 1940) Vital Signs (24h Range):  Temp:  [98 °F (36.7 °C)-102.4 °F (39.1 °C)] 100.4 °F (38 °C)  Pulse:  [] 123  Resp:  [16-31] 16  SpO2:  [95 %-100 %] 98 %  BP: (126-186)/(61-93) 135/75     Weight: 63 kg (138 lb 14.2 oz) (11/15/20 2236)  Body mass index is 23.11 kg/m².  Body surface area is 1.7 meters squared.    I/O last 3 completed shifts:  In: 686.2 [I.V.:36.2; Other:500; IV Piggyback:150]  Out: 3500 [Other:3500]    Physical Exam  Vitals signs and nursing note reviewed.   Constitutional:       Appearance: Normal appearance.   HENT:      Head: Normocephalic.      Nose: Nose normal.      Mouth/Throat:      Mouth: Mucous membranes are moist.   Eyes:      Pupils: Pupils are equal, round, and reactive to light.   Neck:      Musculoskeletal: Normal range of motion and neck supple.      Comments: No JVD  Cardiovascular:      Rate and Rhythm: Tachycardia present. Rhythm irregular.   Pulmonary:      Effort: Pulmonary effort is normal.      Breath sounds: Normal breath sounds.   Abdominal:      General: Bowel sounds are normal. There is no distension.      Palpations: Abdomen is soft.   Musculoskeletal:      Right lower leg: No edema.      Left lower leg: No edema.   Skin:     General: Skin is warm and dry.   Neurological:      Mental Status: She is alert and oriented to person, place, and time.   Psychiatric:         Mood and Affect: Mood normal.         Behavior: Behavior normal.         Significant Labs:  CBC:   Recent Labs   Lab 11/16/20 0430   WBC 22.82*   RBC 3.22*   HGB 9.0*   HCT 29.3*      MCV 91   MCH 28.0   MCHC 30.7*     CMP:   Recent Labs   Lab 11/16/20 0430 11/16/20  1118   GLU 69* 75   CALCIUM 7.9*  --    ALBUMIN 2.7*  --    PROT 7.9  --      --    K 5.0  --    CO2 23  --      --    BUN 65*  --    CREATININE 8.8*  --    ALKPHOS 127  --    ALT 11  --    AST 20  --    BILITOT 1.1*  --      All labs within the past 24 hours have been reviewed.    Significant  Imaging:      Assessment/Plan:     Active Diagnoses:    Diagnosis Date Noted POA    Pneumonia [J18.9] 11/16/2020 Yes    Subtherapeutic anticoagulation [Z51.81, Z79.01] 11/16/2020 Not Applicable    Fever [R50.9] 11/15/2020 Yes    Chronic systolic heart failure [I50.22] 02/03/2020 Yes    Anemia due to chronic kidney disease [N18.9, D63.1] 07/25/2017 Yes    ESRD (end stage renal disease) on HD M,W, F [N18.6] 05/11/2016 Yes     Chronic    HTN (hypertension) [I10] 08/15/2013 Yes     Chronic      Problems Resolved During this Admission:     Assessment and plan:    Pneumonia:  -await sputum and blood culture.  -antibiotics HM    ESRD:  -M-W-F schedule; HD in progress today 3 L Net goal  -renal chemistry stable  -sodium thiosulfate for calciphylaxis  -avoid nephrotoxins, strict I&O, and daily weight    Renal BMD:  -Ca corrects for hypoalbuminemia  -possible ok    Hypertension:  -okay for now  -continue home meds  -monitor closely    Anemia:  -under goal; stable  -transfuse for hemoglobin less than 7  -monitor closely    Chronic systolic heart failure:  -compensated  -chronically elevated BNP  -continue home meds    Subtherapeutic INR:  -off Coumadin  -heparin drip until cardiac consult         Thank you for your consult. I will follow-up with patient. Please contact us if you have any additional questions.    Gabe Barksdale, KANE  Nephrology  [facility]

## 2020-11-17 NOTE — PROGRESS NOTES
Pharmacist Renal Dose Adjustment Note    Griselda Neely is a 75 y.o. female being treated with the medication Lovenox    Patient Data:    Vital Signs (Most Recent):  Temp: 99 °F (37.2 °C) (11/16/20 2348)  Pulse: (!) 116 (11/16/20 2348)  Resp: 17 (11/16/20 2348)  BP: (!) 147/92 (11/16/20 2348)  SpO2: 100 % (11/16/20 2348)   Vital Signs (72h Range):  Temp:  [98 °F (36.7 °C)-102.4 °F (39.1 °C)]   Pulse:  []   Resp:  [16-31]   BP: (126-186)/()   SpO2:  [93 %-100 %]      Recent Labs   Lab 11/15/20  1920 11/16/20  0430   CREATININE 8.1* 8.8*     Serum creatinine: 8.8 mg/dL (H) 11/16/20 0430  Estimated creatinine clearance: 5 mL/min (A)    Medication:Lovenox dose: 60 mg frequency Q12H will be changed to medication:Lovenox dose:60 mg frequency:Q24H    Pharmacist's Name: Baudilio Downs  Pharmacist's Extension: [phone]

## 2020-11-17 NOTE — SUBJECTIVE & OBJECTIVE
Interval History:   Assumed care 11/17  tmax 100.4 on 11/16 @ 1940. Telemetry reviewed Afib with rate 110's.  cdiff antigen positive, toxin and pcr negative. Patient reports 1 bm yesterday and 1 bm today.  (3 bm's recorded per chart review).  Overnight patient refused blood draws for aPTT so heparin gtt was discontinued and full dose lovenox started.     Patient seen and examined. States that she feel ok. She is now agreeable to blood draw x 1 (cbc, cmp, mg, phos ordered). Denies chest pain, palpations, sob, cough, ab pain, n/n, f/c.      Review of Systems   As above  Objective:     Vital Signs (Most Recent):  Temp: 98.2 °F (36.8 °C) (11/17/20 0734)  Pulse: (!) 115 (11/17/20 0734)  Resp: 20 (11/17/20 0734)  BP: 127/72 (11/17/20 0734)  SpO2: 100 % (11/17/20 0734) Vital Signs (24h Range):  Temp:  [32 °F (0 °C)-100.4 °F (38 °C)] 98.2 °F (36.8 °C)  Pulse:  [] 115  Resp:  [15-20] 20  SpO2:  [97 %-100 %] 100 %  BP: (127-186)/(65-93) 127/72     Weight: 60.8 kg (134 lb 0.6 oz)  Body mass index is 22.31 kg/m².    Intake/Output Summary (Last 24 hours) at 11/17/2020 1053  Last data filed at 11/17/2020 0400  Gross per 24 hour   Intake 957.25 ml   Output 3500 ml   Net -2542.75 ml      Physical Exam  Vitals signs and nursing note reviewed.   Constitutional:       General: She is not in acute distress.     Appearance: She is well-developed. She is not toxic-appearing.      Comments: Frail, eldery   HENT:      Head: Atraumatic.      Nose:      Comments: Nasal canula  Eyes:      Conjunctiva/sclera: Conjunctivae normal.      Pupils: Pupils are equal, round, and reactive to light.   Neck:      Musculoskeletal: Normal range of motion and neck supple.      Thyroid: No thyromegaly.      Vascular: No JVD.   Cardiovascular:      Rate and Rhythm: Tachycardia present. Rhythm irregular.      Heart sounds: Normal heart sounds. No murmur. No friction rub. No gallop.    Pulmonary:      Effort: Pulmonary effort is normal.      Breath  sounds: Normal breath sounds. No wheezing or rales.   Abdominal:      General: Bowel sounds are normal. There is no distension.      Palpations: Abdomen is soft.      Tenderness: There is no abdominal tenderness.   Musculoskeletal: Normal range of motion.         General: No tenderness.      Right lower leg: No edema.      Left lower leg: No edema.   Skin:     General: Skin is warm and dry.   Neurological:      Mental Status: She is alert and oriented to person, place, and time.      Deep Tendon Reflexes: Reflexes are normal and symmetric.   Psychiatric:         Behavior: Behavior normal.         Significant Labs:   CBC:   Recent Labs   Lab 11/15/20  1920 11/16/20  0430   WBC 21.76* 22.82*   HGB 9.4* 9.0*   HCT 31.5* 29.3*   * 304     CMP:   Recent Labs   Lab 11/15/20  1920 11/16/20  0430 11/16/20  1118    138  --    K 4.9 5.0  --    CL 96 100  --    CO2 21* 23  --    GLU 83 69* 75   BUN 58* 65*  --    CREATININE 8.1* 8.8*  --    CALCIUM 7.9* 7.9*  --    PROT 8.1 7.9  --    ALBUMIN 3.0* 2.7*  --    BILITOT 1.1* 1.1*  --    ALKPHOS 135 127  --    AST 24 20  --    ALT 12 11  --    ANIONGAP 19* 15  --    EGFRNONAA 4.4* 4.0*  --      All pertinent labs within the past 24 hours have been reviewed.    Significant Imaging: no new images today

## 2020-11-17 NOTE — CARE UPDATE
Patient refusing to have blood drawn for PTT. Her iv access came out, and she suffered multiple stick before access was re-established. After that she has refused to be stuck any further. The heparin has been off 5 hours. Rather than bolusing and resticking again, the patient will be started on full dose enoxaparin rather than the heparin. She needs full anticoagulation for A fib.

## 2020-11-18 VITALS
WEIGHT: 131.19 LBS | OXYGEN SATURATION: 98 % | TEMPERATURE: 98 F | RESPIRATION RATE: 16 BRPM | DIASTOLIC BLOOD PRESSURE: 70 MMHG | HEIGHT: 65 IN | BODY MASS INDEX: 21.86 KG/M2 | HEART RATE: 82 BPM | SYSTOLIC BLOOD PRESSURE: 125 MMHG

## 2020-11-18 LAB
ALBUMIN SERPL BCP-MCNC: 2.5 G/DL (ref 3.5–5.2)
ALP SERPL-CCNC: 100 U/L (ref 55–135)
ALT SERPL W/O P-5'-P-CCNC: 9 U/L (ref 10–44)
ANION GAP SERPL CALC-SCNC: 13 MMOL/L (ref 8–16)
AST SERPL-CCNC: 14 U/L (ref 10–40)
BASOPHILS # BLD AUTO: 0.02 K/UL (ref 0–0.2)
BASOPHILS NFR BLD: 0.3 % (ref 0–1.9)
BILIRUB SERPL-MCNC: 0.8 MG/DL (ref 0.1–1)
BUN SERPL-MCNC: 45 MG/DL (ref 8–23)
CALCIUM SERPL-MCNC: 7.4 MG/DL (ref 8.7–10.5)
CHLORIDE SERPL-SCNC: 99 MMOL/L (ref 95–110)
CO2 SERPL-SCNC: 23 MMOL/L (ref 23–29)
CREAT SERPL-MCNC: 7.5 MG/DL (ref 0.5–1.4)
DIFFERENTIAL METHOD: ABNORMAL
EOSINOPHIL # BLD AUTO: 0.1 K/UL (ref 0–0.5)
EOSINOPHIL NFR BLD: 1.8 % (ref 0–8)
ERYTHROCYTE [DISTWIDTH] IN BLOOD BY AUTOMATED COUNT: 18.3 % (ref 11.5–14.5)
EST. GFR  (AFRICAN AMERICAN): 5.6 ML/MIN/1.73 M^2
EST. GFR  (NON AFRICAN AMERICAN): 4.9 ML/MIN/1.73 M^2
GLUCOSE SERPL-MCNC: 90 MG/DL (ref 70–110)
GLUCOSE SERPL-MCNC: 95 MG/DL (ref 70–110)
HCT VFR BLD AUTO: 28.4 % (ref 37–48.5)
HGB BLD-MCNC: 8.5 G/DL (ref 12–16)
IMM GRANULOCYTES # BLD AUTO: 0.03 K/UL (ref 0–0.04)
IMM GRANULOCYTES NFR BLD AUTO: 0.4 % (ref 0–0.5)
INR PPP: 1.3
LYMPHOCYTES # BLD AUTO: 0.6 K/UL (ref 1–4.8)
LYMPHOCYTES NFR BLD: 7.9 % (ref 18–48)
MAGNESIUM SERPL-MCNC: 1.8 MG/DL (ref 1.6–2.6)
MCH RBC QN AUTO: 26.7 PG (ref 27–31)
MCHC RBC AUTO-ENTMCNC: 29.9 G/DL (ref 32–36)
MCV RBC AUTO: 89 FL (ref 82–98)
MONOCYTES # BLD AUTO: 0.4 K/UL (ref 0.3–1)
MONOCYTES NFR BLD: 6.1 % (ref 4–15)
NEUTROPHILS # BLD AUTO: 6 K/UL (ref 1.8–7.7)
NEUTROPHILS NFR BLD: 83.5 % (ref 38–73)
NRBC BLD-RTO: 0 /100 WBC
PHOSPHATE SERPL-MCNC: 4.1 MG/DL (ref 2.7–4.5)
PLATELET # BLD AUTO: 296 K/UL (ref 150–350)
PMV BLD AUTO: 9.9 FL (ref 9.2–12.9)
POTASSIUM SERPL-SCNC: 3.7 MMOL/L (ref 3.5–5.1)
PROT SERPL-MCNC: 7 G/DL (ref 6–8.4)
PROTHROMBIN TIME: 15.8 SEC (ref 10.6–14.8)
RBC # BLD AUTO: 3.18 M/UL (ref 4–5.4)
SODIUM SERPL-SCNC: 135 MMOL/L (ref 136–145)
WBC # BLD AUTO: 7.17 K/UL (ref 3.9–12.7)

## 2020-11-18 PROCEDURE — 25000003 PHARM REV CODE 250: Performed by: STUDENT IN AN ORGANIZED HEALTH CARE EDUCATION/TRAINING PROGRAM

## 2020-11-18 PROCEDURE — 83735 ASSAY OF MAGNESIUM: CPT

## 2020-11-18 PROCEDURE — 25000003 PHARM REV CODE 250: Performed by: NURSE PRACTITIONER

## 2020-11-18 PROCEDURE — 63600175 PHARM REV CODE 636 W HCPCS: Performed by: INTERNAL MEDICINE

## 2020-11-18 PROCEDURE — 36415 COLL VENOUS BLD VENIPUNCTURE: CPT

## 2020-11-18 PROCEDURE — 63600175 PHARM REV CODE 636 W HCPCS: Performed by: NURSE PRACTITIONER

## 2020-11-18 PROCEDURE — 84100 ASSAY OF PHOSPHORUS: CPT

## 2020-11-18 PROCEDURE — 90935 HEMODIALYSIS ONE EVALUATION: CPT

## 2020-11-18 PROCEDURE — C9113 INJ PANTOPRAZOLE SODIUM, VIA: HCPCS | Performed by: NURSE PRACTITIONER

## 2020-11-18 PROCEDURE — 80053 COMPREHEN METABOLIC PANEL: CPT

## 2020-11-18 PROCEDURE — 85025 COMPLETE CBC W/AUTO DIFF WBC: CPT

## 2020-11-18 PROCEDURE — 85610 PROTHROMBIN TIME: CPT

## 2020-11-18 PROCEDURE — 99900035 HC TECH TIME PER 15 MIN (STAT)

## 2020-11-18 PROCEDURE — 94761 N-INVAS EAR/PLS OXIMETRY MLT: CPT

## 2020-11-18 RX ORDER — LEVOFLOXACIN 250 MG/1
250 TABLET ORAL
Qty: 3 TABLET | Refills: 0 | Status: SHIPPED | OUTPATIENT
Start: 2020-11-19 | End: 2020-11-30

## 2020-11-18 RX ORDER — LEVOFLOXACIN 750 MG/1
750 TABLET ORAL ONCE
Status: COMPLETED | OUTPATIENT
Start: 2020-11-18 | End: 2020-11-18

## 2020-11-18 RX ORDER — LEVOFLOXACIN 250 MG/1
250 TABLET ORAL
Status: DISCONTINUED | OUTPATIENT
Start: 2020-11-19 | End: 2020-11-18 | Stop reason: HOSPADM

## 2020-11-18 RX ORDER — PANTOPRAZOLE SODIUM 40 MG/1
40 TABLET, DELAYED RELEASE ORAL
Status: DISCONTINUED | OUTPATIENT
Start: 2020-11-19 | End: 2020-11-18 | Stop reason: HOSPADM

## 2020-11-18 RX ADMIN — MAGNESIUM OXIDE 400 MG: 400 TABLET ORAL at 08:11

## 2020-11-18 RX ADMIN — LOSARTAN POTASSIUM 25 MG: 25 TABLET, FILM COATED ORAL at 08:11

## 2020-11-18 RX ADMIN — ENOXAPARIN SODIUM 60 MG: 60 INJECTION SUBCUTANEOUS at 04:11

## 2020-11-18 RX ADMIN — LEVOFLOXACIN 750 MG: 750 TABLET, FILM COATED ORAL at 08:11

## 2020-11-18 RX ADMIN — CALCIUM ACETATE 667 MG: 667 CAPSULE ORAL at 08:11

## 2020-11-18 RX ADMIN — MUPIROCIN: 20 OINTMENT TOPICAL at 08:11

## 2020-11-18 RX ADMIN — DILTIAZEM HYDROCHLORIDE 240 MG: 120 CAPSULE, COATED, EXTENDED RELEASE ORAL at 08:11

## 2020-11-18 RX ADMIN — ASPIRIN 81 MG: 81 TABLET, DELAYED RELEASE ORAL at 08:11

## 2020-11-18 RX ADMIN — ISOSORBIDE MONONITRATE 30 MG: 30 TABLET, EXTENDED RELEASE ORAL at 08:11

## 2020-11-18 RX ADMIN — PANTOPRAZOLE SODIUM 40 MG: 40 INJECTION, POWDER, FOR SOLUTION INTRAVENOUS at 08:11

## 2020-11-18 NOTE — PLAN OF CARE
11/18/20 1012   Final Note   Assessment Type Final Discharge Note   Anticipated Discharge Disposition Home-Health   Hospital Follow Up  Appt(s) scheduled? Yes   Discharge plans and expectations educations in teach back method with documentation complete? Yes   Post-Acute Status   Post-Acute Authorization Home Health   Home Health Status Referrals Sent   Discharge Delays None known at this time

## 2020-11-18 NOTE — RESPIRATORY THERAPY
11/17/20 2321   Patient Assessment/Suction   Level of Consciousness (AVPU) alert   Respiratory Effort Unlabored   Expansion/Accessory Muscles/Retractions expansion symmetric;no retractions;no use of accessory muscles   All Lung Fields Breath Sounds clear   Rhythm/Pattern, Respiratory no shortness of breath reported;pattern regular;unlabored   PRE-TX-O2   O2 Device (Oxygen Therapy) nasal cannula   Flow (L/min) 3   SpO2 100 %   Pulse Oximetry Type Intermittent   $ Pulse Oximetry - Multiple Charge Pulse Oximetry - Multiple   Pulse 80   Resp 14   Respiratory Interventions   Cough And Deep Breathing done with encouragement   Breathing Techniques/Airway Clearance deep/controlled cough encouraged;diaphragmatic breathing promoted   Education   $ Education Other (see comment);15 min  (resp ass.,deep diaphragmatic breathing exercises)   Respiratory Evaluation   $ Care Plan Tech Time 15 min   Evaluation For   (care plan)        11/17/20 2321   Patient Assessment/Suction   Level of Consciousness (AVPU) alert   Respiratory Effort Unlabored   Expansion/Accessory Muscles/Retractions expansion symmetric;no retractions;no use of accessory muscles   All Lung Fields Breath Sounds clear   Rhythm/Pattern, Respiratory no shortness of breath reported;pattern regular;unlabored   PRE-TX-O2   O2 Device (Oxygen Therapy) nasal cannula   Flow (L/min) 3   SpO2 100 %   Pulse Oximetry Type Intermittent   $ Pulse Oximetry - Multiple Charge Pulse Oximetry - Multiple   Pulse 80   Resp 14   Respiratory Interventions   Cough And Deep Breathing done with encouragement   Breathing Techniques/Airway Clearance deep/controlled cough encouraged;diaphragmatic breathing promoted   Education   $ Education Other (see comment);15 min  (resp ass.,deep diaphragmatic breathing exercises)   Respiratory Evaluation   $ Care Plan Tech Time 15 min   Evaluation For   (care plan)

## 2020-11-18 NOTE — DISCHARGE SUMMARY
[facility]  Discharge Summary      Patient Name: Griselda Neely  MRN: [id number]  Admission Date: 11/15/2020  Hospital Length of Stay: 1 days  Discharge Date and Time: 11/18/2020  2:22 PM  Attending Physician: Maris Chamorro DO   Discharging Provider: Maris Chamorro DO  Primary Care Provider: Kalie Neely MD      Hospital Course:   HPI: History was obtained from the the family present at the bedside and ER physician Sign-out. The patient is unable to provide history due to mental status changes. Patient presents to the ED by her son who reports she is weak and confused. The patient went to [employer] with her son today who reports they had to leave [employer] early because the patient reports she was not feeling well. This afternoon just prior to arrival he checked her temperature and she was noted to have a fever 100.6, so he brought her to the hospital for evaluation. The son reports the patient was recently admitted to the hospital for pneumonia and she presented confused at that time. She was admitted a second time recently for GI bleed. He states he took it upon himself to discontinue the coumadin for repeated admission for GI bleeds.   In the emergency room, patient is febrile 100.6 initially and then 102.4. She is tachycardic and hypertensive. Her oxygen saturation is wnl on her usual home dose of 4L NC. CBC with WBC 21,000, H/H 9.4/31.5. CMP with CO2 21, BuN 58, creat 8.1. BNP >4500 and troponin elevated 0.088. I have reviewed the EKG and it shows A-flutter at 132bpm, nonspecific T wave abnormality. CXR with central pulmonary vascular prominence and tiny right pleural effusion. The patient is treated with antipyretics, IV antibiotics,  IV diltiazem.      Handoff summary:   Patient was admitted for sepsis secondary to pneumonia started on broad-spectrum antibiotics (vanc and Zosyn) later transitioned to meropenem IV fluids per protocol were not given secondary to ESRD and  "concerns for volume overload.  Blood and sputum cultures no growth by time of discharge.  C diff antigen positive however toxin and PCR negative.  Patient remained afebrile greater than 24 hours by time of discharge.  She will be discharged with levofloxacin to complete 7 day antibiotic course.     Patient was in AFib RVR on ED arrival given 10 mg diltiazem push with improvement to heart rate. Cardiology consulted for recommendations as patient has known atrial thrombus and is in atrial fib and has MVR.  Patient was placed on heparin drip pending cardiac consultation.  Patient refused repeat PTT draws and heparin drip was discontinued and therapeutic Lovenox was initiated. Case d/w cardiology, given hx of gi bleeds inr goal around 1.5 and will be managed as outpatient. Cleared from cardiology standpoint for dc.   Nephrology consulted for routine Monday Wednesday Friday dialysis.    Discharge planning discussed with patient and patient's son who was present at bedside.  Agreeable to plan.  Return precautions given.  Physical exam on day of discharge:     /70   Pulse 82   Temp 98 °F (36.7 °C)   Resp 16   Ht 5' 5" (1.651 m)   Wt 59.5 kg (131 lb 2.8 oz)   LMP  (LMP Unknown)   SpO2 98%   Breastfeeding No   BMI 21.83 kg/m²     Gen: nad, elderly, laying in bed  CV: irr rate controlled  Resp: CTA B/l, nasal canula  AB: +bs soft ntnd  Skin: dry, warm      Consults:   Consults (From admission, onward)        Status Ordering Provider     Inpatient consult to Cardio Pulmonary Rehab  Once     Provider:  (Not yet assigned)    Acknowledged GAGANDEEP GREGORIO     Inpatient consult to Cardiology  Once     Provider:  Jerrod Asif MD    Acknowledged SKYE BLAIR     Inpatient consult to Hospitalist  Once     Provider:  Skye Blair NP    Acknowledged VENANCIO KELLER     Inpatient consult to Nephrology  Once     Provider:  Joseph Ingram MD    Acknowledged SKYE BLAIR     Pharmacy to dose " Vancomycin consult  Once     Provider:  (Not yet assigned)    Acknowledged SKYE DOBBS          No new Assessment & Plan notes have been filed under this hospital service since the last note was generated.  Service: Hospital Medicine    Final Active Diagnoses:    Diagnosis Date Noted POA    PRINCIPAL PROBLEM:  Pneumonia [J18.9] 11/16/2020 Yes    Tachycardia [R00.0] 11/17/2020 Yes    Subtherapeutic anticoagulation [Z51.81, Z79.01] 11/16/2020 Not Applicable    Fever [R50.9] 11/15/2020 Yes    Chronic systolic heart failure [I50.22] 02/03/2020 Yes    DNR (do not resuscitate) [Z66] 09/30/2019 Yes     Chronic    Anemia due to chronic kidney disease [N18.9, D63.1] 07/25/2017 Yes    Other persistent atrial fibrillation [I48.19] 07/18/2017 Yes    ESRD (end stage renal disease) on HD M,W, F [N18.6] 05/11/2016 Yes     Chronic    HTN (hypertension) [I10] 08/15/2013 Yes     Chronic      Problems Resolved During this Admission:       Discharged Condition: stable    Disposition: Home-Health Care [facility]    Follow Up:  Follow-up Information     Kalie Neely MD.    Specialty: Family Medicine  Contact information:  [address]  [address]  [place] [address]  [phone]             Katharina Powell MD.    Specialties: INTERVENTIONAL CARDIOLOGY, Cardiology, Cardiovascular Disease  Contact information:  [address]  [address]  [place] [address]  [phone]             Joseph Ingram MD.    Specialty: Nephrology  Contact information:  [address] [facility] [address]  [phone]                 Patient Instructions:      Diet diabetic     Diet renal     Notify your health care provider if you experience any of the following:  temperature >100.4     Notify your health care provider if you experience any of the following:  persistent nausea and vomiting or diarrhea     Notify your health care provider if you experience any of the following:  difficulty breathing or increased cough     Notify  your health care provider if you experience any of the following:  persistent dizziness, light-headedness, or visual disturbances     Notify your health care provider if you experience any of the following:  increased confusion or weakness     HOME HEALTH ORDERS   Order Comments: Subsequent Home Health Orders    Current Medications:  Current Facility-Administered Medications:  0.9%  NaCl infusion, , Intravenous, PRN, Joseph Ingram MD  0.9%  NaCl infusion, , Intravenous, Once, Joseph Ingram MD  acetaminophen suppository 650 mg, 650 mg, Rectal, Q4H PRN, Rebeca Blair NP  aspirin EC tablet 81 mg, 81 mg, Oral, Daily, Rebeca Blair NP, 81 mg at 11/17/20 0829  calcium acetate(phosphat bind) capsule 667 mg, 667 mg, Oral, Daily, Rebeca Blair NP, 667 mg at 11/17/20 0829  dextrose 50% injection 12.5 g, 12.5 g, Intravenous, PRN, Rebeca Blair NP, 12.5 g at 11/16/20 1442  dextrose 50% injection 25 g, 25 g, Intravenous, PRN, Rebeca Blair NP, 25 g at 11/16/20 1119  diltiaZEM 24 hr capsule 240 mg, 240 mg, Oral, Daily, Rebeca Blair NP, 240 mg at 11/17/20 0829  enoxaparin injection 60 mg, 1 mg/kg, Subcutaneous, Q24H, Chalino Victoria MD, 60 mg at 11/18/20 0407  glucagon (human recombinant) injection 1 mg, 1 mg, Intramuscular, PRN, Rebeca Blair NP  glucose chewable tablet 16 g, 16 g, Oral, PRN, Rebeca Rastafarian, NP  glucose chewable tablet 24 g, 24 g, Oral, PRN, Rebeca Blair NP  insulin regular injection 1-6 Units, 1-6 Units, Subcutaneous, PRN, Chalino Victoria MD  isosorbide mononitrate 24 hr tablet 30 mg, 30 mg, Oral, Daily, Rebeca Blair NP, 30 mg at 11/17/20 0830  [START ON 11/19/2020] levoFLOXacin tablet 250 mg, 250 mg, Oral, Before breakfast, Maris Chamorro DO  levoFLOXacin tablet 750 mg, 750 mg, Oral, Once, Maris Chamorro DO  losartan tablet 25 mg, 25 mg, Oral, Daily, Rebeca Blair NP, 25 mg at 11/17/20 0830  magnesium oxide tablet 400 mg, 400 mg, Oral, Daily,  Rebeca Blair NP, 400 mg at 11/17/20 0833  metoprolol succinate (TOPROL-XL) 24 hr tablet 25 mg, 25 mg, Oral, Nightly, Rebeca Blair NP, 25 mg at 11/17/20 2136  mupirocin 2 % ointment, , Nasal, BID, Joseph Ingram MD  ondansetron injection 4 mg, 4 mg, Intravenous, Q6H PRN, Rebeca Blair NP  pantoprazole injection 40 mg, 40 mg, Intravenous, Daily, Rebeca Blair NP, 40 mg at 11/17/20 0830  sodium chloride 0.9% flush 10 mL, 10 mL, Intravenous, PRN, Rebeca Blair NP  sodium thiosulfate 12.5 gram/50 mL (250 mg/mL) injection 25 g, 25 g, Intravenous, Every Mon, Wed, Fri, Joseph Ingram MD, 25 g at 11/16/20 1120  vancomycin - pharmacy to dose, , Intravenous, pharmacy to manage frequency, Rebeca Blair NP        Nursing:   Resume HH orders    Diet:   renal diet    Activities:   activity as tolerated    Labs:  Report Lab results to PCP.    Referrals/ Consults  Physical Therapy to evaluate and treat. Evaluate for home safety and equipment needs; Establish/upgrade home exercise program. Perform / instruct on therapeutic exercises, gait training, transfer training, and Range of Motion.  Occupational Therapy to evaluate and treat. Evaluate home environment for safety and equipment needs. Perform/Instruct on transfers, ADL training, ROM, and therapeutic exercises.  Speech Therapy  to evaluate and treat for  Swallowing.    Home Health Aide:  Nursing Three times weekly, Physical Therapy Three times weekly, Occupational Therapy Three times weekly, Speech Language Pathology Three times weekly and Home Health Aide Three times weekly     Order Specific Question Answer Comments   What Home Health Agency is the patient currently using? Other/External      Activity as tolerated       Significant Diagnostic Studies: Labs:   CBC   Recent Labs   Lab 11/17/20  1143 11/18/20  0506   WBC 10.17 7.17   HGB 8.0* 8.5*   HCT 27.3* 28.4*    296   , INR   Lab Results   Component Value Date    INR 1.3 11/18/2020     INR 1.4 11/17/2020    INR 1.5 11/16/2020    and All labs within the past 24 hours have been reviewed    cdiff antigen positive, toxin negative, PCR negative  Blood cultures no growth to date    Medications:  Reconciled Home Medications:      Medication List      START taking these medications    levoFLOXacin 250 MG tablet  Commonly known as: LEVAQUIN  Take 1 tablet (250 mg total) by mouth before breakfast.  Start taking on: November 19, 2020        CONTINUE taking these medications    aspirin 81 MG EC tablet  Commonly known as: ECOTRIN  Take 1 tablet (81 mg total) by mouth once daily.     calcium acetate(phosphat bind) 667 mg capsule  Commonly known as: PHOSLO  Take 667 mg by mouth once daily.     diltiaZEM 240 MG Cdcr  Commonly known as: DILACOR XR  Take 1 capsule (240 mg total) by mouth once daily.     isosorbide mononitrate 30 MG 24 hr tablet  Commonly known as: IMDUR  Take 30 mg by mouth once daily.     losartan 25 MG tablet  Commonly known as: COZAAR  Take 25 mg by mouth once daily.     magnesium oxide 400 mg (241.3 mg magnesium) tablet  Commonly known as: MAG-OX  Take 1 tablet by mouth once daily     metoprolol succinate 25 MG 24 hr tablet  Commonly known as: TOPROL-XL  Take 1 tablet (25 mg total) by mouth nightly.     ondansetron 4 MG Tbdl  Commonly known as: ZOFRAN-ODT  Take 4 mg by mouth every 6 (six) hours as needed.     oxyCODONE-acetaminophen 5-325 mg per tablet  Commonly known as: PERCOCET  Take 1 tablet by mouth every 6 (six) hours as needed for Pain.     pantoprazole 40 MG tablet  Commonly known as: PROTONIX  Take 1 tablet (40 mg total) by mouth once daily.     JENNIFER-KODAK ORAL  Take 1 tablet by mouth once daily.     wheelchair An  1 Device by Misc.(Non-Drug; Combo Route) route as needed (needed for transport).     zinc sulfate 220 mg Tab tablet  Take 220 mg by mouth every other day.        STOP taking these medications    amoxicillin-clavulanate 500-125mg 500-125 mg Tab  Commonly known as:  AUGMENTIN     clindamycin 300 MG capsule  Commonly known as: CLEOCIN     warfarin 2 MG tablet  Commonly known as: COUMADIN            Indwelling Lines/Drains at time of discharge:   Lines/Drains/Airways     Drain                 Hemodialysis AV Fistula 08/08/19 0214 Right upper arm 468 days                Time spent on the discharge of patient: 32 minutes  Patient was seen and examined on the date of discharge and determined to be suitable for discharge.         Maris Chamorro DO  Department of Hospital Medicine  Counts include 234 beds at the [facility]

## 2020-11-18 NOTE — CARE UPDATE
11/18/20 0853   PRE-TX-O2   O2 Device (Oxygen Therapy) nasal cannula   Flow (L/min) 4   SpO2 98 %   Pulse Oximetry Type Intermittent   $ Pulse Oximetry - Multiple Charge Pulse Oximetry - Multiple   Pulse 60   Resp 16   Respiratory Evaluation   $ Care Plan Tech Time 15 min

## 2020-11-18 NOTE — PLAN OF CARE
CONNER spoke w/ cecy  @ [facility]. Verified the orders need to be sent over. CONNER confirmed and sent. Will follow up

## 2020-11-18 NOTE — PROGRESS NOTES
Pharmacist Intervention IV to PO Note    Griselda Neely is a 75 y.o. female being treated with IV medication pantoprazole    Patient Data:    Vital Signs (Most Recent):  Temp: 98 °F (36.7 °C) (11/18/20 0905)  Pulse: 77 (11/18/20 1130)  Resp: 16 (11/18/20 0905)  BP: (!) 143/88 (11/18/20 1130)  SpO2: 99 % (11/18/20 0712)   Vital Signs (72h Range):  Temp:  [32 °F (0 °C)-102.4 °F (39.1 °C)]   Pulse:  []   Resp:  [14-31]   BP: (115-186)/()   SpO2:  [93 %-100 %]      CBC:  Recent Labs   Lab 11/16/20  0430 11/17/20  1143 11/18/20  0506   WBC 22.82* 10.17 7.17   RBC 3.22* 2.99* 3.18*   HGB 9.0* 8.0* 8.5*   HCT 29.3* 27.3* 28.4*    278 296   MCV 91 91 89   MCH 28.0 26.8* 26.7*   MCHC 30.7* 29.3* 29.9*     CMP:     Recent Labs   Lab 11/16/20  0430 11/16/20  1118 11/17/20  1143 11/18/20  0506   GLU 69* 75 106 95   CALCIUM 7.9*  --  7.8* 7.4*   ALBUMIN 2.7*  --  2.5* 2.5*   PROT 7.9  --  6.8 7.0     --  138 135*   K 5.0  --  3.8 3.7   CO2 23  --  24 23     --  100 99   BUN 65*  --  38* 45*   CREATININE 8.8*  --  6.6* 7.5*   ALKPHOS 127  --  104 100   ALT 11  --  8* 9*   AST 20  --  13 14   BILITOT 1.1*  --  0.9 0.8       Dietary Orders:  Diet Orders            Diet renal [facility]; Isolation Tray - Regular [place]: Renal starting at 11/16 1046            Based on the following criteria, this patient qualifies for intravenous to oral conversion:  [x] The patients gastrointestinal tract is functioning (tolerating medications via oral or enteral route for 24 hours and tolerating food or enteral feeds for 24 hours).  [x] The patient is not scheduled for surgery within the next 24 hours    IV medication Pantoprazole 40 mg daily will be changed to oral medication Pantoprazole 40 mg daily    Pharmacist's Name: Katlyn Downs  Pharmacist's Extension: [phone]

## 2020-11-18 NOTE — PLAN OF CARE
SW spoke w/ pt via phone. Pt denies any needs for d/c. Pt expressed the desire to [facility] - for [facility]. SW will send d/c orders off.

## 2020-11-18 NOTE — PROGRESS NOTES
[facility]  Cardiology  Progress Note    Patient Name: Griselda Neely  MRN: [id number]  Admission Date: 11/15/2020  Hospital Length of Stay: 0 days  Code Status: DNR   Attending Physician: Maris Chamorro DO   Primary Care Physician: Kalie Neely MD  Expected Discharge Date:   Principal Problem:Pneumonia    Subjective:       Interval History: Patient is more alert. Denies any chest pain or shortness of breath. Tele shows atrial fibrillation with controlled ventricular response.     ROS   Denies any diarrhea.   Denies any abdominal pain.   Objective:     Vital Signs (Most Recent):  Temp: 98.6 °F (37 °C) (11/17/20 1529)  Pulse: 104 (11/17/20 1529)  Resp: 18 (11/17/20 1529)  BP: 134/73 (11/17/20 1529)  SpO2: 99 % (11/17/20 1529) Vital Signs (24h Range):  Temp:  [32 °F (0 °C)-100.4 °F (38 °C)] 98.6 °F (37 °C)  Pulse:  [104-123] 104  Resp:  [15-20] 18  SpO2:  [97 %-100 %] 99 %  BP: (127-158)/(65-92) 134/73     Weight: 60.8 kg (134 lb 0.6 oz)  Body mass index is 22.31 kg/m².    SpO2: 99 %  O2 Device (Oxygen Therapy): nasal cannula      Intake/Output Summary (Last 24 hours) at 11/17/2020 1849  Last data filed at 11/17/2020 1400  Gross per 24 hour   Intake 817.25 ml   Output --   Net 817.25 ml       Lines/Drains/Airways     Drain                 Hemodialysis AV Fistula 08/08/19 0214 Right upper arm 467 days          Peripheral Intravenous Line                 Peripheral IV - Single Lumen 11/17/20 0101 20 G Anterior;Left Forearm less than 1 day                Scheduled Meds:   sodium chloride 0.9%   Intravenous Once    aspirin  81 mg Oral Daily    calcium acetate(phosphat bind)  667 mg Oral Daily    diltiaZEM  240 mg Oral Daily    enoxparin  1 mg/kg Subcutaneous Q24H    isosorbide mononitrate  30 mg Oral Daily    losartan  25 mg Oral Daily    magnesium oxide  400 mg Oral Daily    meropenem (MERREM) IVPB  500 mg Intravenous Q24H    metoprolol succinate  25 mg Oral Nightly    mupirocin   Nasal  BID    pantoprazole  40 mg Intravenous Daily    sodium thiosulfate  25 g Intravenous Every Mon, Wed, Fri    vancomycin (VANCOCIN) IVPB  1,000 mg Intravenous Once     Continuous Infusions:  PRN Meds:.sodium chloride 0.9%, acetaminophen, dextrose 50%, dextrose 50%, glucagon (human recombinant), glucose, glucose, insulin regular, ondansetron, sodium chloride 0.9%, Pharmacy to dose Vancomycin consult **AND** vancomycin - pharmacy to dose     Physical Exam  HEENT: Normocephalic, atraumatic, PERRL, Conjunctiva pink, no scleral icterus.   CVS: S1S2+, iRRR, SM+, no rubs or gallops, JVP: Normal.  LUNGS: Clear  ABDOMEN: Soft, NT, BS+  EXTREMITIES: No cyanosis or edema  NEURO: AAO X 3.    Significant Labs:   BMP:   Recent Labs   Lab 11/15/20  1920 11/16/20  0430 11/16/20  1118 11/17/20  1143   GLU 83 69* 75 106    138  --  138   K 4.9 5.0  --  3.8   CL 96 100  --  100   CO2 21* 23  --  24   BUN 58* 65*  --  38*   CREATININE 8.1* 8.8*  --  6.6*   CALCIUM 7.9* 7.9*  --  7.8*   MG  --  1.8  --  1.8   , CMP   Recent Labs   Lab 11/15/20  1920 11/16/20  0430 11/16/20  1118 11/17/20  1143    138  --  138   K 4.9 5.0  --  3.8   CL 96 100  --  100   CO2 21* 23  --  24   GLU 83 69* 75 106   BUN 58* 65*  --  38*   CREATININE 8.1* 8.8*  --  6.6*   CALCIUM 7.9* 7.9*  --  7.8*   PROT 8.1 7.9  --  6.8   ALBUMIN 3.0* 2.7*  --  2.5*   BILITOT 1.1* 1.1*  --  0.9   ALKPHOS 135 127  --  104   AST 24 20  --  13   ALT 12 11  --  8*   ANIONGAP 19* 15  --  14   ESTGFRAFRICA 5.1* 4.6*  --  6.6*   EGFRNONAA 4.4* 4.0*  --  5.7*   , CBC   Recent Labs   Lab 11/15/20  1920 11/16/20  0430 11/17/20  1143   WBC 21.76* 22.82* 10.17   HGB 9.4* 9.0* 8.0*   HCT 31.5* 29.3* 27.3*   * 304 278   , INR   Recent Labs   Lab 11/15/20  2348 11/16/20  0430 11/17/20  1143   INR 1.4 1.5 1.4   , Lipid Panel No results for input(s): CHOL, HDL, LDLCALC, TRIG, CHOLHDL in the last 48 hours. and Troponin   Recent Labs   Lab 11/15/20  2315 11/16/20  0430  11/16/20  1118   TROPONINI 0.055* 0.064* 0.055*       Significant Imaging: Reviewed  Assessment and Plan:     IMPRESSION:  Fever. Etiology?     Altered mental status. ?Menigitis or encephalitis.     Atrial fibrillation.  Rate controlled.      Recurrent GI bleed. Evaluated by GI and recommend decreasing the dose of coumadin.       Congestive heart failure. Reduced LV systolic function.  Does not appear to be volume overload on exam.     Mitral regurgitation.  Severe.  With severe calcification.  Not a candidate for percutaneous interventions.  After discussion with Dr. Lora, she decided to be treated only with medical management. Newly reduced LVEF to 35-40% on 4/11/2019. Status post mitral valve replacement with a size 25 mm [email or web address] bovine pericardial prosthesis via right thoracotomy on by Dr. Lora 3/21/2019.      End-stage renal disease on hemodialysis.  MWF. Follows with Dr. Ingram.     Tobacco abuse.  Ongoing despite counseling.     Left atrial thrombus. Improved per last TRUPTI.      PVD. Conservative management recommended.      CODE STATUS was addressed on 10/1/2020 and the patient was alert, oriented ×3.  She preferred to be a DNR/DNI.  CODE STATUS was addressed in the presence of her sister and son.       PLAN:  1. Continue current management.         Katharina Powell MD  Cardiology  [facility]

## 2020-11-19 ENCOUNTER — TELEPHONE (OUTPATIENT)
Dept: CARDIOLOGY | Facility: CLINIC | Age: 75
End: 2020-11-19
Payer: MEDICARE

## 2020-11-19 ENCOUNTER — TELEPHONE (OUTPATIENT)
Dept: FAMILY MEDICINE | Facility: CLINIC | Age: 75
End: 2020-11-19

## 2020-11-19 NOTE — TELEPHONE ENCOUNTER
----- Message from Alexandra Riley, Patient Care Assistant sent at 11/19/2020  1:44 PM CST -----  Regarding: advice  Contact: cecily with [facility]  Type: Needs Medical Advice  Who Called:  cecily with [facility]  Best Call Back Number:    Additional Information: cecily with [facility] states to let the nurse know next time check the pt's PTINR. Thanks!

## 2020-11-19 NOTE — TELEPHONE ENCOUNTER
----- Message from Eliceo Jasmine sent at 11/19/2020  9:31 AM CST -----  Regarding: hosp f.u appt  Pt just got of the hosp yesterday and is needing hosp f.u appt pt had pneumonia d.c from [facility]  Pt [phone]

## 2020-11-20 LAB
BACTERIA BLD CULT: NORMAL
BACTERIA BLD CULT: NORMAL

## 2020-11-23 ENCOUNTER — TELEPHONE (OUTPATIENT)
Dept: CARDIOLOGY | Facility: CLINIC | Age: 75
End: 2020-11-23

## 2020-11-23 NOTE — TELEPHONE ENCOUNTER
----- Message from Alexandra Riley, Patient Care Assistant sent at 11/23/2020 11:04 AM CST -----  Regarding: advice  Contact: Zoya with [facility]  Type: Needs Medical Advice  Who Called:  Zoya with [facility]  Best Call Back Number:    Additional Information: Zoya with [facility] states she would like a callback regarding should the pt continue to take warsarin. Thanks!

## 2020-11-24 ENCOUNTER — DOCUMENT SCAN (OUTPATIENT)
Dept: HOME HEALTH SERVICES | Facility: HOSPITAL | Age: 75
End: 2020-11-24
Payer: MEDICARE

## 2020-11-25 ENCOUNTER — TELEPHONE (OUTPATIENT)
Dept: CARDIOLOGY | Facility: CLINIC | Age: 75
End: 2020-11-25

## 2020-11-25 NOTE — TELEPHONE ENCOUNTER
DD WITH HH CALLED REGARDING INR   INR 1.2 TAKING 2.5MG QHS  PER KP PT RANGE 1.5-2.0  TOLD HH NURSE WOULD CALL BACK WITH DOSAGE.  [phone]    Per kp take 5mg tonight 2.5mg tomrw repeat Friday. DD was notified.

## 2020-11-27 ENCOUNTER — DOCUMENT SCAN (OUTPATIENT)
Dept: HOME HEALTH SERVICES | Facility: HOSPITAL | Age: 75
End: 2020-11-27
Payer: MEDICARE

## 2020-11-30 ENCOUNTER — OFFICE VISIT (OUTPATIENT)
Dept: FAMILY MEDICINE | Facility: CLINIC | Age: 75
End: 2020-11-30
Payer: MEDICARE

## 2020-11-30 VITALS
SYSTOLIC BLOOD PRESSURE: 138 MMHG | HEIGHT: 65 IN | BODY MASS INDEX: 22.49 KG/M2 | WEIGHT: 135 LBS | DIASTOLIC BLOOD PRESSURE: 84 MMHG | HEART RATE: 72 BPM

## 2020-11-30 DIAGNOSIS — Z66 DNR (DO NOT RESUSCITATE): ICD-10-CM

## 2020-11-30 DIAGNOSIS — I10 ESSENTIAL HYPERTENSION: ICD-10-CM

## 2020-11-30 DIAGNOSIS — I50.32 CHRONIC DIASTOLIC HEART FAILURE: ICD-10-CM

## 2020-11-30 DIAGNOSIS — K92.2 GASTROINTESTINAL HEMORRHAGE, UNSPECIFIED GASTROINTESTINAL HEMORRHAGE TYPE: ICD-10-CM

## 2020-11-30 DIAGNOSIS — Z99.2 ANEMIA DUE TO CHRONIC KIDNEY DISEASE, ON CHRONIC DIALYSIS: ICD-10-CM

## 2020-11-30 DIAGNOSIS — N18.6 ANEMIA DUE TO CHRONIC KIDNEY DISEASE, ON CHRONIC DIALYSIS: ICD-10-CM

## 2020-11-30 DIAGNOSIS — J44.9 CHRONIC OBSTRUCTIVE PULMONARY DISEASE, UNSPECIFIED COPD TYPE: ICD-10-CM

## 2020-11-30 DIAGNOSIS — I48.91 ATRIAL FIBRILLATION WITH RVR: Primary | ICD-10-CM

## 2020-11-30 DIAGNOSIS — N18.6 ESRD (END STAGE RENAL DISEASE): ICD-10-CM

## 2020-11-30 DIAGNOSIS — D63.1 ANEMIA DUE TO CHRONIC KIDNEY DISEASE, ON CHRONIC DIALYSIS: ICD-10-CM

## 2020-11-30 DIAGNOSIS — R53.81 PHYSICAL DEBILITY: ICD-10-CM

## 2020-11-30 DIAGNOSIS — J96.11 CHRONIC RESPIRATORY FAILURE WITH HYPOXIA: ICD-10-CM

## 2020-11-30 DIAGNOSIS — I27.20 PULMONARY HYPERTENSION: ICD-10-CM

## 2020-11-30 PROCEDURE — 3008F PR BODY MASS INDEX (BMI) DOCUMENTED: ICD-10-PCS | Mod: S$GLB,,, | Performed by: FAMILY MEDICINE

## 2020-11-30 PROCEDURE — 3288F PR FALLS RISK ASSESSMENT DOCUMENTED: ICD-10-PCS | Mod: S$GLB,,, | Performed by: FAMILY MEDICINE

## 2020-11-30 PROCEDURE — 3288F FALL RISK ASSESSMENT DOCD: CPT | Mod: S$GLB,,, | Performed by: FAMILY MEDICINE

## 2020-11-30 PROCEDURE — 1101F PT FALLS ASSESS-DOCD LE1/YR: CPT | Mod: S$GLB,,, | Performed by: FAMILY MEDICINE

## 2020-11-30 PROCEDURE — 99495 TCM SERVICES (MODERATE COMPLEXITY): ICD-10-PCS | Mod: S$GLB,,, | Performed by: FAMILY MEDICINE

## 2020-11-30 PROCEDURE — 99495 TRANSJ CARE MGMT MOD F2F 14D: CPT | Mod: S$GLB,,, | Performed by: FAMILY MEDICINE

## 2020-11-30 PROCEDURE — 3008F BODY MASS INDEX DOCD: CPT | Mod: S$GLB,,, | Performed by: FAMILY MEDICINE

## 2020-11-30 PROCEDURE — 1101F PR PT FALLS ASSESS DOC 0-1 FALLS W/OUT INJ PAST YR: ICD-10-PCS | Mod: S$GLB,,, | Performed by: FAMILY MEDICINE

## 2020-11-30 RX ORDER — TRAMADOL HYDROCHLORIDE 50 MG/1
50 TABLET ORAL
COMMUNITY
End: 2021-03-16

## 2020-11-30 RX ORDER — WARFARIN 2 MG/1
TABLET ORAL
Status: ON HOLD | COMMUNITY
End: 2020-12-03 | Stop reason: HOSPADM

## 2020-11-30 RX ORDER — LEVALBUTEROL INHALATION SOLUTION 0.63 MG/3ML
1 SOLUTION RESPIRATORY (INHALATION) EVERY 8 HOURS PRN
Qty: 1 BOX | Refills: 4 | Status: SHIPPED | OUTPATIENT
Start: 2020-11-30 | End: 2020-12-31 | Stop reason: SDUPTHER

## 2020-11-30 NOTE — PROGRESS NOTES
SUBJECTIVE:    Patient ID: Griselda Neely is a 75 y.o. female.    Chief Complaint: HFU    The patient with multiple chronic illnesses including end-stage renal disease on dialysis, COPD with chronic hypoxemia cardiomyopathy with chronic congestive heart failure is seen for follow-up visit.  Patient has had numerous hospitalizations this year.  Her most recent involved acute shortness of breath after dialysis she was noted to be in AFib with congestive heart failure and also had pneumonia.  She was given appropriate antibiotics and diuresed well dialysis.  She is discharged home in the care of home health and family.  She is pretty much at her baseline.  They would like additional help with her transport and she needs new medications for nebulizer.  Medications and she has them appropriate      Admit Date: 11/15/20  Discharge Date: 11/18/20  Discharge Facility: Hospital    Medication Reconciliation:  No medication changes.   New Prescriptions filled after discharge: not applicable  Discharge summary reviewed:  yes  Pending test results at discharge reviewed:   not applicable  Follow up appointments scheduled:  yes   with Cardiology   Follow up labs/tests ordered:   not applicable  Home Health ordered on discharge:   yes  Home Health company name: [facility]  DME ordered at discharge:   not applicable  How patient is feeling since discharge from the hospital?  ok         No results displayed because visit has over 200 results.      Admission on 11/07/2020, Discharged on 11/10/2020   Component Date Value Ref Range Status    WBC 11/07/2020 6.12  3.90 - 12.70 K/uL Final    RBC 11/07/2020 2.67* 4.00 - 5.40 M/uL Final    Hemoglobin 11/07/2020 7.1* 12.0 - 16.0 g/dL Final    Hematocrit 11/07/2020 24.2* 37.0 - 48.5 % Final    MCV 11/07/2020 91  82 - 98 fL Final    MCH 11/07/2020 26.6* 27.0 - 31.0 pg Final    MCHC 11/07/2020 29.3* 32.0 - 36.0 g/dL Final    RDW 11/07/2020 17.9* 11.5 - 14.5 % Final    Platelets 11/07/2020  304  150 - 350 K/uL Final    MPV 11/07/2020 10.8  9.2 - 12.9 fL Final    Immature Granulocytes 11/07/2020 0.3  0.0 - 0.5 % Final    Gran # (ANC) 11/07/2020 4.7  1.8 - 7.7 K/uL Final    Immature Grans (Abs) 11/07/2020 0.02  0.00 - 0.04 K/uL Final    Lymph # 11/07/2020 0.8* 1.0 - 4.8 K/uL Final    Mono # 11/07/2020 0.5  0.3 - 1.0 K/uL Final    Eos # 11/07/2020 0.1  0.0 - 0.5 K/uL Final    Baso # 11/07/2020 0.02  0.00 - 0.20 K/uL Final    nRBC 11/07/2020 0  0 /100 WBC Final    Gran % 11/07/2020 77.3* 38.0 - 73.0 % Final    Lymph % 11/07/2020 13.1* 18.0 - 48.0 % Final    Mono % 11/07/2020 8.0  4.0 - 15.0 % Final    Eosinophil % 11/07/2020 1.0  0.0 - 8.0 % Final    Basophil % 11/07/2020 0.3  0.0 - 1.9 % Final    Differential Method 11/07/2020 Automated   Final    Sodium 11/07/2020 141  136 - 145 mmol/L Final    Potassium 11/07/2020 4.5  3.5 - 5.1 mmol/L Final    Chloride 11/07/2020 98  95 - 110 mmol/L Final    CO2 11/07/2020 28  23 - 29 mmol/L Final    Glucose 11/07/2020 103  70 - 110 mg/dL Final    BUN 11/07/2020 38* 8 - 23 mg/dL Final    Creatinine 11/07/2020 5.2* 0.5 - 1.4 mg/dL Final    Calcium 11/07/2020 8.2* 8.7 - 10.5 mg/dL Final    Total Protein 11/07/2020 7.1  6.0 - 8.4 g/dL Final    Albumin 11/07/2020 2.6* 3.5 - 5.2 g/dL Final    Total Bilirubin 11/07/2020 0.9  0.1 - 1.0 mg/dL Final    Alkaline Phosphatase 11/07/2020 93  55 - 135 U/L Final    AST 11/07/2020 14  10 - 40 U/L Final    ALT 11/07/2020 10  10 - 44 U/L Final    Anion Gap 11/07/2020 15  8 - 16 mmol/L Final    eGFR if  11/07/2020 8.7* >60 mL/min/1.73 m^2 Final    eGFR if non African American 11/07/2020 7.6* >60 mL/min/1.73 m^2 Final    aPTT 11/07/2020 49.6* 23.6 - 33.3 sec Final    PT 11/07/2020 23.7* 10.6 - 14.8 sec Final    INR 11/07/2020 2.2   Final    UNIT NUMBER 11/07/2020 [id number]   Final    Product Code 11/07/2020 H4341V58   Final    DISPENSE STATUS 11/07/2020 TRANSFUSED   Final     CODING SYSTEM 11/07/2020 [id number]   Final    Unit Blood Type Code 11/07/2020 7300   Final    Unit Blood Type 11/07/2020 B POS   Final    Unit Expiration 11/07/2020 [id number]   Final    Group & Rh 11/07/2020 B POS   Final    Indirect Yasmin 11/07/2020 NEG   Final    SARS-CoV-2 RNA, Amplification, Qual 11/07/2020 Negative  Negative Final    Hemoglobin A1C 11/07/2020 SEE COMMENT  4.5 - 6.2 % Final    Estimated Avg Glucose 11/07/2020 SEE COMMENT  68 - 131 mg/dL Final    Hemoglobin 11/07/2020 8.1* 12.0 - 16.0 g/dL Final    Hematocrit 11/07/2020 26.9* 37.0 - 48.5 % Final    Troponin I 11/07/2020 0.031  <=0.040 ng/mL Final    BSA 11/08/2020 1.67  m2 Final    TDI SEPTAL 11/08/2020 0.04  m/s Final    LV LATERAL E/E' RATIO 11/08/2020 50.00  m/s Final    LV SEPTAL E/E' RATIO 11/08/2020 50.00  m/s Final    Right Atrial Pressure (from IVC) 11/08/2020 8  mmHg Final    AORTIC VALVE CUSP SEPERATION 11/08/2020 1.33  cm Final    TDI LATERAL 11/08/2020 0.04  m/s Final    PV PEAK VELOCITY 11/08/2020 83.11  cm/s Final    LVIDd 11/08/2020 3.68  3.5 - 6.0 cm Final    IVS 11/08/2020 1.82* 0.6 - 1.1 cm Final    Posterior Wall 11/08/2020 1.82* 0.6 - 1.1 cm Final    Ao root annulus 11/08/2020 3.02  cm Final    LVIDs 11/08/2020 2.71  2.1 - 4.0 cm Final    FS 11/08/2020 26  28 - 44 % Final    LV mass 11/08/2020 285.47  g Final    LA size 11/08/2020 5.73  cm Final    RVDD 11/08/2020 330.00  cm Final    Left Ventricle Relative Wall Thick* 11/08/2020 0.99  cm Final    AV mean gradient 11/08/2020 4  mmHg Final    AV valve area 11/08/2020 3.96  cm2 Final    AV Velocity Ratio 11/08/2020 76.33   Final    AV index (prosthetic) 11/08/2020 1.29   Final    Mean e' 11/08/2020 0.04  m/s Final    E wave decelartion time 11/08/2020 395.16  msec Final    LVOT diameter 11/08/2020 1.98  cm Final    LVOT area 11/08/2020 3.1  cm2 Final    LVOT peak ashtyn 11/08/2020 106.86  m/s Final    LVOT peak VTI 11/08/2020 20.96  cm Final     Ao peak kirill 11/08/2020 1.40  m/s Final    Ao VTI 11/08/2020 16.28  cm Final    LVOT stroke volume 11/08/2020 64.50  cm3 Final    AV peak gradient 11/08/2020 8  mmHg Final    TV rest pulmonary artery pressure 11/08/2020 40  mmHg Final    E/E' ratio 11/08/2020 50.00  m/s Final    MV Peak E Kirill 11/08/2020 2.00  m/s Final    TR Max Kirill 11/08/2020 2.84  m/s Final    LV Mass Index 11/08/2020 171  g/m2 Final    Triscuspid Valve Regurgitation Pea* 11/08/2020 32  mmHg Final    Sodium 11/09/2020 135* 136 - 145 mmol/L Final    Potassium 11/09/2020 4.2  3.5 - 5.1 mmol/L Final    Chloride 11/09/2020 100  95 - 110 mmol/L Final    CO2 11/09/2020 20* 23 - 29 mmol/L Final    Glucose 11/09/2020 124* 70 - 110 mg/dL Final    BUN 11/09/2020 41* 8 - 23 mg/dL Final    Creatinine 11/09/2020 6.2* 0.5 - 1.4 mg/dL Final    Calcium 11/09/2020 7.9* 8.7 - 10.5 mg/dL Final    Anion Gap 11/09/2020 15  8 - 16 mmol/L Final    eGFR if African American 11/09/2020 7.1* >60 mL/min/1.73 m^2 Final    eGFR if non African American 11/09/2020 6.1* >60 mL/min/1.73 m^2 Final    Magnesium 11/09/2020 1.9  1.6 - 2.6 mg/dL Final    Phosphorus 11/09/2020 4.7* 2.7 - 4.5 mg/dL Final    WBC 11/09/2020 5.48  3.90 - 12.70 K/uL Final    RBC 11/09/2020 2.79* 4.00 - 5.40 M/uL Final    Hemoglobin 11/09/2020 7.5* 12.0 - 16.0 g/dL Final    Hematocrit 11/09/2020 24.8* 37.0 - 48.5 % Final    MCV 11/09/2020 89  82 - 98 fL Final    MCH 11/09/2020 26.9* 27.0 - 31.0 pg Final    MCHC 11/09/2020 30.2* 32.0 - 36.0 g/dL Final    RDW 11/09/2020 19.9* 11.5 - 14.5 % Final    Platelets 11/09/2020 263  150 - 350 K/uL Final    MPV 11/09/2020 10.4  9.2 - 12.9 fL Final    Immature Granulocytes 11/09/2020 0.7* 0.0 - 0.5 % Final    Gran # (ANC) 11/09/2020 4.2  1.8 - 7.7 K/uL Final    Immature Grans (Abs) 11/09/2020 0.04  0.00 - 0.04 K/uL Final    Lymph # 11/09/2020 0.8* 1.0 - 4.8 K/uL Final    Mono # 11/09/2020 0.4  0.3 - 1.0 K/uL Final    Eos #  11/09/2020 0.1  0.0 - 0.5 K/uL Final    Baso # 11/09/2020 0.03  0.00 - 0.20 K/uL Final    nRBC 11/09/2020 0  0 /100 WBC Final    Gran % 11/09/2020 76.9* 38.0 - 73.0 % Final    Lymph % 11/09/2020 13.7* 18.0 - 48.0 % Final    Mono % 11/09/2020 6.6  4.0 - 15.0 % Final    Eosinophil % 11/09/2020 1.6  0.0 - 8.0 % Final    Basophil % 11/09/2020 0.5  0.0 - 1.9 % Final    Differential Method 11/09/2020 Automated   Final    PT 11/09/2020 18.2* 10.6 - 14.8 sec Final    INR 11/09/2020 1.6   Final    [facility] Miscellaneous Test 11/07/2020 COMMENT   Final    [facility] Test Code: 11/07/2020 519681   Final    [facility] Test Name: 11/07/2020 [id number]   Final    UNIT NUMBER 11/07/2020 [id number]   Final    Product Code 11/07/2020 [id number]   Final    DISPENSE STATUS 11/07/2020 TRANSFUSED   Final    CODING SYSTEM 11/07/2020 [id number]   Final    Unit Blood Type Code 11/07/2020 1700   Final    Unit Blood Type 11/07/2020 B NEG   Final    Unit Expiration 11/07/2020 [id number]   Final    WBC 11/10/2020 4.76  3.90 - 12.70 K/uL Final    RBC 11/10/2020 3.05* 4.00 - 5.40 M/uL Final    Hemoglobin 11/10/2020 8.3* 12.0 - 16.0 g/dL Final    Hematocrit 11/10/2020 27.5* 37.0 - 48.5 % Final    MCV 11/10/2020 90  82 - 98 fL Final    MCH 11/10/2020 27.2  27.0 - 31.0 pg Final    MCHC 11/10/2020 30.2* 32.0 - 36.0 g/dL Final    RDW 11/10/2020 19.2* 11.5 - 14.5 % Final    Platelets 11/10/2020 235  150 - 350 K/uL Final    MPV 11/10/2020 10.9  9.2 - 12.9 fL Final    Immature Granulocytes 11/10/2020 0.4  0.0 - 0.5 % Final    Gran # (ANC) 11/10/2020 3.6  1.8 - 7.7 K/uL Final    Immature Grans (Abs) 11/10/2020 0.02  0.00 - 0.04 K/uL Final    Lymph # 11/10/2020 0.7* 1.0 - 4.8 K/uL Final    Mono # 11/10/2020 0.3  0.3 - 1.0 K/uL Final    Eos # 11/10/2020 0.1  0.0 - 0.5 K/uL Final    Baso # 11/10/2020 0.03  0.00 - 0.20 K/uL Final    nRBC 11/10/2020 0  0 /100 WBC Final    Gran % 11/10/2020 75.7* 38.0 - 73.0 % Final    Lymph %  11/10/2020 14.5* 18.0 - 48.0 % Final    Mono % 11/10/2020 7.1  4.0 - 15.0 % Final    Eosinophil % 11/10/2020 1.7  0.0 - 8.0 % Final    Basophil % 11/10/2020 0.6  0.0 - 1.9 % Final    Differential Method 11/10/2020 Automated   Final    PT 11/10/2020 18.5* 10.6 - 14.8 sec Final    INR 11/10/2020 1.6   Final   Admission on 11/04/2020, Discharged on 11/06/2020   Component Date Value Ref Range Status    Blood Culture, Routine 11/04/2020 No growth after 5 days.   Final    Blood Culture, Routine 11/04/2020 No growth after 5 days.   Final    WBC 11/04/2020 18.70* 3.90 - 12.70 K/uL Final    RBC 11/04/2020 3.58* 4.00 - 5.40 M/uL Final    Hemoglobin 11/04/2020 9.9* 12.0 - 16.0 g/dL Final    Hematocrit 11/04/2020 32.8* 37.0 - 48.5 % Final    MCV 11/04/2020 92  82 - 98 fL Final    MCH 11/04/2020 27.7  27.0 - 31.0 pg Final    MCHC 11/04/2020 30.2* 32.0 - 36.0 g/dL Final    RDW 11/04/2020 18.3* 11.5 - 14.5 % Final    Platelets 11/04/2020 369* 150 - 350 K/uL Final    MPV 11/04/2020 10.7  9.2 - 12.9 fL Final    Immature Granulocytes 11/04/2020 0.6* 0.0 - 0.5 % Final    Gran # (ANC) 11/04/2020 17.4* 1.8 - 7.7 K/uL Final    Immature Grans (Abs) 11/04/2020 0.12* 0.00 - 0.04 K/uL Final    Lymph # 11/04/2020 0.8* 1.0 - 4.8 K/uL Final    Mono # 11/04/2020 0.3  0.3 - 1.0 K/uL Final    Eos # 11/04/2020 0.0  0.0 - 0.5 K/uL Final    Baso # 11/04/2020 0.03  0.00 - 0.20 K/uL Final    nRBC 11/04/2020 0  0 /100 WBC Final    Gran % 11/04/2020 93.1* 38.0 - 73.0 % Final    Lymph % 11/04/2020 4.1* 18.0 - 48.0 % Final    Mono % 11/04/2020 1.8* 4.0 - 15.0 % Final    Eosinophil % 11/04/2020 0.2  0.0 - 8.0 % Final    Basophil % 11/04/2020 0.2  0.0 - 1.9 % Final    Differential Method 11/04/2020 Automated   Final    Sodium 11/04/2020 140  136 - 145 mmol/L Final    Potassium 11/04/2020 5.7* 3.5 - 5.1 mmol/L Final    Chloride 11/04/2020 100  95 - 110 mmol/L Final    CO2 11/04/2020 25  23 - 29 mmol/L Final    Glucose  11/04/2020 85  70 - 110 mg/dL Final    BUN 11/04/2020 40* 8 - 23 mg/dL Final    Creatinine 11/04/2020 6.9* 0.5 - 1.4 mg/dL Final    Calcium 11/04/2020 7.6* 8.7 - 10.5 mg/dL Final    Total Protein 11/04/2020 7.7  6.0 - 8.4 g/dL Final    Albumin 11/04/2020 2.8* 3.5 - 5.2 g/dL Final    Total Bilirubin 11/04/2020 1.3* 0.1 - 1.0 mg/dL Final    Alkaline Phosphatase 11/04/2020 131  55 - 135 U/L Final    AST 11/04/2020 34  10 - 40 U/L Final    ALT 11/04/2020 16  10 - 44 U/L Final    Anion Gap 11/04/2020 15  8 - 16 mmol/L Final    eGFR if African American 11/04/2020 6.2* >60 mL/min/1.73 m^2 Final    eGFR if non African American 11/04/2020 5.4* >60 mL/min/1.73 m^2 Final    Lactate (Lactic Acid) 11/04/2020 2.0* 0.5 - 1.9 mmol/L Final    Lactate (Lactic Acid) 11/04/2020 1.7  0.5 - 1.9 mmol/L Final    Specimen UA 11/04/2020 Urine, Clean Catch   Final    Color, UA 11/04/2020 Brown* Yellow, Straw, Jolene Final    Appearance, UA 11/04/2020 Cloudy* Clear Final    pH, UA 11/04/2020 7.0  5.0 - 8.0 Final    Specific [place], UA 11/04/2020 1.020  1.005 - 1.030 Final    Protein, UA 11/04/2020 3+* Negative Final    Glucose, UA 11/04/2020 Negative  Negative Final    Ketones, UA 11/04/2020 Negative  Negative Final    Bilirubin (UA) 11/04/2020 2+* Negative Final    Occult Blood UA 11/04/2020 3+* Negative Final    Nitrite, UA 11/04/2020 Positive* Negative Final    Urobilinogen, UA 11/04/2020 1.0  Negative EU/dL Final    Leukocytes, UA 11/04/2020 2+* Negative Final    Influenza A, Molecular 11/04/2020 Negative  Negative Final    Influenza B, Molecular 11/04/2020 Negative  Negative Final    Flu A & B Source 11/04/2020 Nasal swab   Final    PT 11/04/2020 19.7* 10.6 - 14.8 sec Final    INR 11/04/2020 1.8   Final    BNP 11/04/2020 >4,500* 0 - 99 pg/mL Final    Troponin I 11/04/2020 0.057* <=0.040 ng/mL Final    Procalcitonin 11/04/2020 2.10* 0.00 - 0.50 ng/mL Final    SARS-CoV-2 RNA, Amplification, Qual  11/04/2020 Negative  Negative Final    RBC, UA 11/04/2020 >100* 0 - 4 /hpf Final    WBC, UA 11/04/2020 >100* 0 - 5 /hpf Final    Bacteria 11/04/2020 Negative  None-Occ /hpf Final    Squam Epithel, UA 11/04/2020 39  /hpf Final    Hyaline Casts, UA 11/04/2020 81* 0-1/lpf /lpf Final    Microscopic Comment 11/04/2020 SEE COMMENT   Final    Urine Culture, Routine 11/04/2020 No growth   Final    Lactate (Lactic Acid) 11/04/2020 1.7  0.5 - 1.9 mmol/L Final    Troponin I 11/04/2020 0.051* <=0.040 ng/mL Final    Magnesium 11/04/2020 1.6  1.6 - 2.6 mg/dL Final    Sodium 11/05/2020 139  136 - 145 mmol/L Final    Potassium 11/05/2020 4.4  3.5 - 5.1 mmol/L Final    Chloride 11/05/2020 100  95 - 110 mmol/L Final    CO2 11/05/2020 23  23 - 29 mmol/L Final    Glucose 11/05/2020 72  70 - 110 mg/dL Final    BUN 11/05/2020 31* 8 - 23 mg/dL Final    Creatinine 11/05/2020 5.7* 0.5 - 1.4 mg/dL Final    Calcium 11/05/2020 8.2* 8.7 - 10.5 mg/dL Final    Anion Gap 11/05/2020 16  8 - 16 mmol/L Final    eGFR if African American 11/05/2020 7.8* >60 mL/min/1.73 m^2 Final    eGFR if non African American 11/05/2020 6.8* >60 mL/min/1.73 m^2 Final    Magnesium 11/05/2020 1.8  1.6 - 2.6 mg/dL Final    WBC 11/05/2020 14.16* 3.90 - 12.70 K/uL Final    RBC 11/05/2020 3.09* 4.00 - 5.40 M/uL Final    Hemoglobin 11/05/2020 8.4* 12.0 - 16.0 g/dL Final    Hematocrit 11/05/2020 27.9* 37.0 - 48.5 % Final    MCV 11/05/2020 90  82 - 98 fL Final    MCH 11/05/2020 27.2  27.0 - 31.0 pg Final    MCHC 11/05/2020 30.1* 32.0 - 36.0 g/dL Final    RDW 11/05/2020 17.8* 11.5 - 14.5 % Final    Platelets 11/05/2020 289  150 - 350 K/uL Final    MPV 11/05/2020 10.6  9.2 - 12.9 fL Final    Immature Granulocytes 11/05/2020 0.4  0.0 - 0.5 % Final    Gran # (ANC) 11/05/2020 12.8* 1.8 - 7.7 K/uL Final    Immature Grans (Abs) 11/05/2020 0.05* 0.00 - 0.04 K/uL Final    Lymph # 11/05/2020 0.6* 1.0 - 4.8 K/uL Final    Mono # 11/05/2020 0.6  0.3 -  1.0 K/uL Final    Eos # 11/05/2020 0.1  0.0 - 0.5 K/uL Final    Baso # 11/05/2020 0.03  0.00 - 0.20 K/uL Final    nRBC 11/05/2020 0  0 /100 WBC Final    Gran % 11/05/2020 90.6* 38.0 - 73.0 % Final    Lymph % 11/05/2020 4.3* 18.0 - 48.0 % Final    Mono % 11/05/2020 3.9* 4.0 - 15.0 % Final    Eosinophil % 11/05/2020 0.6  0.0 - 8.0 % Final    Basophil % 11/05/2020 0.2  0.0 - 1.9 % Final    Differential Method 11/05/2020 Automated   Final    Phosphorus 11/05/2020 4.2  2.7 - 4.5 mg/dL Final    PT 11/05/2020 22.2* 10.6 - 14.8 sec Final    INR 11/05/2020 2.0   Final    Vancomycin, Random 11/05/2020 18.2  Not established ug/mL Final    Glucose 11/05/2020 72  70 - 110 mg/dL Final    Sodium 11/05/2020 139  136 - 145 mmol/L Final    Potassium 11/05/2020 4.4  3.5 - 5.1 mmol/L Final    Chloride 11/05/2020 100  95 - 110 mmol/L Final    CO2 11/05/2020 23  23 - 29 mmol/L Final    BUN 11/05/2020 31* 8 - 23 mg/dL Final    Calcium 11/05/2020 8.2* 8.7 - 10.5 mg/dL Final    Creatinine 11/05/2020 5.7* 0.5 - 1.4 mg/dL Final    Albumin 11/05/2020 2.4* 3.5 - 5.2 g/dL Final    Phosphorus 11/05/2020 4.2  2.7 - 4.5 mg/dL Final    eGFR if African American 11/05/2020 7.8* >60 mL/min/1.73 m^2 Final    eGFR if non African American 11/05/2020 6.8* >60 mL/min/1.73 m^2 Final    Anion Gap 11/05/2020 16  8 - 16 mmol/L Final    Sodium 11/06/2020 134* 136 - 145 mmol/L Final    Potassium 11/06/2020 4.2  3.5 - 5.1 mmol/L Final    Chloride 11/06/2020 95  95 - 110 mmol/L Final    CO2 11/06/2020 23  23 - 29 mmol/L Final    Glucose 11/06/2020 66* 70 - 110 mg/dL Final    BUN 11/06/2020 45* 8 - 23 mg/dL Final    Creatinine 11/06/2020 7.0* 0.5 - 1.4 mg/dL Final    Calcium 11/06/2020 7.6* 8.7 - 10.5 mg/dL Final    Anion Gap 11/06/2020 16  8 - 16 mmol/L Final    eGFR if African American 11/06/2020 6.1* >60 mL/min/1.73 m^2 Final    eGFR if non African American 11/06/2020 5.3* >60 mL/min/1.73 m^2 Final    Magnesium 11/06/2020 1.9   1.6 - 2.6 mg/dL Final    WBC 11/06/2020 8.87  3.90 - 12.70 K/uL Final    RBC 11/06/2020 3.12* 4.00 - 5.40 M/uL Final    Hemoglobin 11/06/2020 8.4* 12.0 - 16.0 g/dL Final    Hematocrit 11/06/2020 27.7* 37.0 - 48.5 % Final    MCV 11/06/2020 89  82 - 98 fL Final    MCH 11/06/2020 26.9* 27.0 - 31.0 pg Final    MCHC 11/06/2020 30.3* 32.0 - 36.0 g/dL Final    RDW 11/06/2020 17.8* 11.5 - 14.5 % Final    Platelets 11/06/2020 292  150 - 350 K/uL Final    MPV 11/06/2020 10.9  9.2 - 12.9 fL Final    Immature Granulocytes 11/06/2020 0.3  0.0 - 0.5 % Final    Gran # (ANC) 11/06/2020 7.5  1.8 - 7.7 K/uL Final    Immature Grans (Abs) 11/06/2020 0.03  0.00 - 0.04 K/uL Final    Lymph # 11/06/2020 0.7* 1.0 - 4.8 K/uL Final    Mono # 11/06/2020 0.5  0.3 - 1.0 K/uL Final    Eos # 11/06/2020 0.1  0.0 - 0.5 K/uL Final    Baso # 11/06/2020 0.02  0.00 - 0.20 K/uL Final    nRBC 11/06/2020 0  0 /100 WBC Final    Gran % 11/06/2020 84.8* 38.0 - 73.0 % Final    Lymph % 11/06/2020 7.9* 18.0 - 48.0 % Final    Mono % 11/06/2020 5.4  4.0 - 15.0 % Final    Eosinophil % 11/06/2020 1.4  0.0 - 8.0 % Final    Basophil % 11/06/2020 0.2  0.0 - 1.9 % Final    Differential Method 11/06/2020 Automated   Final    Phosphorus 11/06/2020 4.7* 2.7 - 4.5 mg/dL Final    Vancomycin, Random 11/06/2020 16.3  Not established ug/mL Final    Glucose 11/06/2020 66* 70 - 110 mg/dL Final    Sodium 11/06/2020 134* 136 - 145 mmol/L Final    Potassium 11/06/2020 4.2  3.5 - 5.1 mmol/L Final    Chloride 11/06/2020 95  95 - 110 mmol/L Final    CO2 11/06/2020 23  23 - 29 mmol/L Final    BUN 11/06/2020 45* 8 - 23 mg/dL Final    Calcium 11/06/2020 7.6* 8.7 - 10.5 mg/dL Final    Creatinine 11/06/2020 7.0* 0.5 - 1.4 mg/dL Final    Albumin 11/06/2020 2.4* 3.5 - 5.2 g/dL Final    Phosphorus 11/06/2020 4.7* 2.7 - 4.5 mg/dL Final    eGFR if African American 11/06/2020 6.1* >60 mL/min/1.73 m^2 Final    eGFR if non African American 11/06/2020 5.3* >60  mL/min/1.73 m^2 Final    Anion Gap 11/06/2020 16  8 - 16 mmol/L Final   Admission on 10/26/2020, Discharged on 10/29/2020   Component Date Value Ref Range Status    WBC 10/26/2020 13.06* 3.90 - 12.70 K/uL Final    RBC 10/26/2020 3.48* 4.00 - 5.40 M/uL Final    Hemoglobin 10/26/2020 9.7* 12.0 - 16.0 g/dL Final    Hematocrit 10/26/2020 31.8* 37.0 - 48.5 % Final    MCV 10/26/2020 91  82 - 98 fL Final    MCH 10/26/2020 27.9  27.0 - 31.0 pg Final    MCHC 10/26/2020 30.5* 32.0 - 36.0 g/dL Final    RDW 10/26/2020 17.2* 11.5 - 14.5 % Final    Platelets 10/26/2020 236  150 - 350 K/uL Final    MPV 10/26/2020 10.5  9.2 - 12.9 fL Final    Immature Granulocytes 10/26/2020 0.6* 0.0 - 0.5 % Final    Gran # (ANC) 10/26/2020 11.9* 1.8 - 7.7 K/uL Final    Immature Grans (Abs) 10/26/2020 0.08* 0.00 - 0.04 K/uL Final    Lymph # 10/26/2020 0.6* 1.0 - 4.8 K/uL Final    Mono # 10/26/2020 0.5  0.3 - 1.0 K/uL Final    Eos # 10/26/2020 0.0  0.0 - 0.5 K/uL Final    Baso # 10/26/2020 0.03  0.00 - 0.20 K/uL Final    nRBC 10/26/2020 0  0 /100 WBC Final    Gran % 10/26/2020 91.0* 38.0 - 73.0 % Final    Lymph % 10/26/2020 4.5* 18.0 - 48.0 % Final    Mono % 10/26/2020 3.7* 4.0 - 15.0 % Final    Eosinophil % 10/26/2020 0.0  0.0 - 8.0 % Final    Basophil % 10/26/2020 0.2  0.0 - 1.9 % Final    Differential Method 10/26/2020 Automated   Final    Sodium 10/26/2020 139  136 - 145 mmol/L Final    Potassium 10/26/2020 5.1  3.5 - 5.1 mmol/L Final    Chloride 10/26/2020 94* 95 - 110 mmol/L Final    CO2 10/26/2020 27  23 - 29 mmol/L Final    Glucose 10/26/2020 77  70 - 110 mg/dL Final    BUN 10/26/2020 36* 8 - 23 mg/dL Final    Creatinine 10/26/2020 5.0* 0.5 - 1.4 mg/dL Final    Calcium 10/26/2020 8.7  8.7 - 10.5 mg/dL Final    Total Protein 10/26/2020 7.1  6.0 - 8.4 g/dL Final    Albumin 10/26/2020 2.9* 3.5 - 5.2 g/dL Final    Total Bilirubin 10/26/2020 1.6* 0.1 - 1.0 mg/dL Final    Alkaline Phosphatase 10/26/2020 124  55  - 135 U/L Final    AST 10/26/2020 40  10 - 40 U/L Final    ALT 10/26/2020 26  10 - 44 U/L Final    Anion Gap 10/26/2020 18* 8 - 16 mmol/L Final    eGFR if African American 10/26/2020 9.2* >60 mL/min/1.73 m^2 Final    eGFR if non African American 10/26/2020 7.9* >60 mL/min/1.73 m^2 Final    Troponin I 10/26/2020 0.072* <=0.040 ng/mL Final    BNP 10/26/2020 >4,500* 0 - 99 pg/mL Final    SARS-CoV-2 RNA, Amplification, Qual 10/26/2020 Negative  Negative Final    Blood Culture, Routine 10/26/2020 No growth after 5 days.   Final    Blood Culture, Routine 10/26/2020 No growth after 5 days.   Final    Lactate (Lactic Acid) 10/26/2020 2.3* 0.5 - 1.9 mmol/L Final    Magnesium 10/26/2020 1.8  1.6 - 2.6 mg/dL Final    Troponin I 10/27/2020 0.059* <=0.040 ng/mL Final    PT 10/27/2020 42.3* 10.6 - 14.8 sec Final    INR 10/27/2020 4.7   Final    Lactate (Lactic Acid) 10/27/2020 1.6  0.5 - 1.9 mmol/L Final    Procalcitonin 10/26/2020 18.83* 0.00 - 0.50 ng/mL Final    Sodium 10/27/2020 136  136 - 145 mmol/L Final    Potassium 10/27/2020 4.8  3.5 - 5.1 mmol/L Final    Chloride 10/27/2020 93* 95 - 110 mmol/L Final    CO2 10/27/2020 26  23 - 29 mmol/L Final    Glucose 10/27/2020 76  70 - 110 mg/dL Final    BUN 10/27/2020 45* 8 - 23 mg/dL Final    Creatinine 10/27/2020 5.8* 0.5 - 1.4 mg/dL Final    Calcium 10/27/2020 8.1* 8.7 - 10.5 mg/dL Final    Anion Gap 10/27/2020 17* 8 - 16 mmol/L Final    eGFR if African American 10/27/2020 7.7* >60 mL/min/1.73 m^2 Final    eGFR if non African American 10/27/2020 6.6* >60 mL/min/1.73 m^2 Final    Magnesium 10/27/2020 2.1  1.6 - 2.6 mg/dL Final    Phosphorus 10/27/2020 5.7* 2.7 - 4.5 mg/dL Final    PT 10/27/2020 41.4* 10.6 - 14.8 sec Final    INR 10/27/2020 4.5   Final    Vancomycin, Random 10/27/2020 11.9  Not established ug/mL Final    Sodium 10/28/2020 133* 136 - 145 mmol/L Final    Potassium 10/28/2020 4.2  3.5 - 5.1 mmol/L Final    Chloride 10/28/2020 91* 95  - 110 mmol/L Final    CO2 10/28/2020 23  23 - 29 mmol/L Final    Glucose 10/28/2020 75  70 - 110 mg/dL Final    BUN 10/28/2020 55* 8 - 23 mg/dL Final    Creatinine 10/28/2020 6.7* 0.5 - 1.4 mg/dL Final    Calcium 10/28/2020 7.5* 8.7 - 10.5 mg/dL Final    Anion Gap 10/28/2020 19* 8 - 16 mmol/L Final    eGFR if African American 10/28/2020 6.4* >60 mL/min/1.73 m^2 Final    eGFR if non African American 10/28/2020 5.6* >60 mL/min/1.73 m^2 Final    Magnesium 10/28/2020 2.1  1.6 - 2.6 mg/dL Final    Phosphorus 10/28/2020 5.5* 2.7 - 4.5 mg/dL Final    PT 10/28/2020 28.6* 10.6 - 14.8 sec Final    INR 10/28/2020 2.8   Final    WBC 10/28/2020 8.07  3.90 - 12.70 K/uL Final    RBC 10/28/2020 3.40* 4.00 - 5.40 M/uL Final    Hemoglobin 10/28/2020 9.4* 12.0 - 16.0 g/dL Final    Hematocrit 10/28/2020 31.7* 37.0 - 48.5 % Final    MCV 10/28/2020 93  82 - 98 fL Final    MCH 10/28/2020 27.6  27.0 - 31.0 pg Final    MCHC 10/28/2020 29.7* 32.0 - 36.0 g/dL Final    RDW 10/28/2020 17.1* 11.5 - 14.5 % Final    Platelets 10/28/2020 207  150 - 350 K/uL Final    MPV 10/28/2020 10.7  9.2 - 12.9 fL Final    Immature Granulocytes 10/28/2020 0.4  0.0 - 0.5 % Final    Gran # (ANC) 10/28/2020 6.8  1.8 - 7.7 K/uL Final    Immature Grans (Abs) 10/28/2020 0.03  0.00 - 0.04 K/uL Final    Lymph # 10/28/2020 0.6* 1.0 - 4.8 K/uL Final    Mono # 10/28/2020 0.5  0.3 - 1.0 K/uL Final    Eos # 10/28/2020 0.1  0.0 - 0.5 K/uL Final    Baso # 10/28/2020 0.02  0.00 - 0.20 K/uL Final    nRBC 10/28/2020 0  0 /100 WBC Final    Gran % 10/28/2020 84.3* 38.0 - 73.0 % Final    Lymph % 10/28/2020 6.9* 18.0 - 48.0 % Final    Mono % 10/28/2020 6.7  4.0 - 15.0 % Final    Eosinophil % 10/28/2020 1.5  0.0 - 8.0 % Final    Basophil % 10/28/2020 0.2  0.0 - 1.9 % Final    Differential Method 10/28/2020 Automated   Final    Sodium 10/29/2020 136  136 - 145 mmol/L Final    Potassium 10/29/2020 4.1  3.5 - 5.1 mmol/L Final    Chloride 10/29/2020  98  95 - 110 mmol/L Final    CO2 10/29/2020 24  23 - 29 mmol/L Final    Glucose 10/29/2020 71  70 - 110 mg/dL Final    BUN 10/29/2020 35* 8 - 23 mg/dL Final    Creatinine 10/29/2020 4.9* 0.5 - 1.4 mg/dL Final    Calcium 10/29/2020 7.5* 8.7 - 10.5 mg/dL Final    Anion Gap 10/29/2020 14  8 - 16 mmol/L Final    eGFR if African American 10/29/2020 9.4* >60 mL/min/1.73 m^2 Final    eGFR if non African American 10/29/2020 8.1* >60 mL/min/1.73 m^2 Final    Magnesium 10/29/2020 1.9  1.6 - 2.6 mg/dL Final    Phosphorus 10/29/2020 3.8  2.7 - 4.5 mg/dL Final    PT 10/29/2020 22.0* 10.6 - 14.8 sec Final    INR 10/29/2020 2.0   Final    Vancomycin, Random 10/29/2020 19.3  Not established ug/mL Final   Admission on 10/25/2020, Discharged on 10/25/2020   Component Date Value Ref Range Status    WBC 10/25/2020 14.15* 3.90 - 12.70 K/uL Final    RBC 10/25/2020 3.54* 4.00 - 5.40 M/uL Final    Hemoglobin 10/25/2020 10.1* 12.0 - 16.0 g/dL Final    Hematocrit 10/25/2020 33.5* 37.0 - 48.5 % Final    MCV 10/25/2020 95  82 - 98 fL Final    MCH 10/25/2020 28.5  27.0 - 31.0 pg Final    MCHC 10/25/2020 30.1* 32.0 - 36.0 g/dL Final    RDW 10/25/2020 17.6* 11.5 - 14.5 % Final    Platelets 10/25/2020 250  150 - 350 K/uL Final    MPV 10/25/2020 10.5  9.2 - 12.9 fL Final    Immature Granulocytes 10/25/2020 1.1* 0.0 - 0.5 % Final    Gran # (ANC) 10/25/2020 12.7* 1.8 - 7.7 K/uL Final    Immature Grans (Abs) 10/25/2020 0.16* 0.00 - 0.04 K/uL Final    Lymph # 10/25/2020 0.8* 1.0 - 4.8 K/uL Final    Mono # 10/25/2020 0.4  0.3 - 1.0 K/uL Final    Eos # 10/25/2020 0.0  0.0 - 0.5 K/uL Final    Baso # 10/25/2020 0.03  0.00 - 0.20 K/uL Final    nRBC 10/25/2020 0  0 /100 WBC Final    Gran % 10/25/2020 90.0* 38.0 - 73.0 % Final    Lymph % 10/25/2020 5.7* 18.0 - 48.0 % Final    Mono % 10/25/2020 3.0* 4.0 - 15.0 % Final    Eosinophil % 10/25/2020 0.0  0.0 - 8.0 % Final    Basophil % 10/25/2020 0.2  0.0 - 1.9 % Final     Platelet Estimate 10/25/2020 Appears normal   Final    Aniso 10/25/2020 Slight   Final    Poik 10/25/2020 Slight   Final    Poly 10/25/2020 Occasional   Final    Target Cells 10/25/2020 Occasional   Final    Differential Method 10/25/2020 Automated   Final    Sodium 10/25/2020 141  136 - 145 mmol/L Final    Potassium 10/25/2020 5.3* 3.5 - 5.1 mmol/L Final    Chloride 10/25/2020 97  95 - 110 mmol/L Final    CO2 10/25/2020 26  23 - 29 mmol/L Final    Glucose 10/25/2020 92  70 - 110 mg/dL Final    BUN 10/25/2020 61* 8 - 23 mg/dL Final    Creatinine 10/25/2020 7.0* 0.5 - 1.4 mg/dL Final    Calcium 10/25/2020 8.0* 8.7 - 10.5 mg/dL Final    Total Protein 10/25/2020 7.3  6.0 - 8.4 g/dL Final    Albumin 10/25/2020 3.0* 3.5 - 5.2 g/dL Final    Total Bilirubin 10/25/2020 1.1* 0.1 - 1.0 mg/dL Final    Alkaline Phosphatase 10/25/2020 152* 55 - 135 U/L Final    AST 10/25/2020 70* 10 - 40 U/L Final    ALT 10/25/2020 35  10 - 44 U/L Final    Anion Gap 10/25/2020 18* 8 - 16 mmol/L Final    eGFR if African American 10/25/2020 6.1* >60 mL/min/1.73 m^2 Final    eGFR if non African American 10/25/2020 5.3* >60 mL/min/1.73 m^2 Final    Troponin I 10/25/2020 0.068* <=0.040 ng/mL Final    BNP 10/25/2020 >4,500* 0 - 99 pg/mL Final    PT 10/25/2020 37.8* 10.6 - 14.8 sec Final    INR 10/25/2020 4.0   Final    BNP 10/25/2020 >4,500* 0 - 99 pg/mL Final   Admission on 10/01/2020, Discharged on 10/04/2020   Component Date Value Ref Range Status    WBC 10/01/2020 5.90  3.90 - 12.70 K/uL Final    RBC 10/01/2020 2.36* 4.00 - 5.40 M/uL Final    Hemoglobin 10/01/2020 6.7* 12.0 - 16.0 g/dL Final    Hematocrit 10/01/2020 21.9* 37.0 - 48.5 % Final    MCV 10/01/2020 93  82 - 98 fL Final    MCH 10/01/2020 28.4  27.0 - 31.0 pg Final    MCHC 10/01/2020 30.6* 32.0 - 36.0 g/dL Final    RDW 10/01/2020 18.7* 11.5 - 14.5 % Final    Platelets 10/01/2020 167  150 - 350 K/uL Final    MPV 10/01/2020 10.4  9.2 - 12.9 fL Final     Immature Granulocytes 10/01/2020 0.3  0.0 - 0.5 % Final    Gran # (ANC) 10/01/2020 4.4  1.8 - 7.7 K/uL Final    Immature Grans (Abs) 10/01/2020 0.02  0.00 - 0.04 K/uL Final    Lymph # 10/01/2020 0.8* 1.0 - 4.8 K/uL Final    Mono # 10/01/2020 0.6  0.3 - 1.0 K/uL Final    Eos # 10/01/2020 0.0  0.0 - 0.5 K/uL Final    Baso # 10/01/2020 0.03  0.00 - 0.20 K/uL Final    nRBC 10/01/2020 1* 0 /100 WBC Final    Gran % 10/01/2020 74.3* 38.0 - 73.0 % Final    Lymph % 10/01/2020 13.4* 18.0 - 48.0 % Final    Mono % 10/01/2020 10.8  4.0 - 15.0 % Final    Eosinophil % 10/01/2020 0.7  0.0 - 8.0 % Final    Basophil % 10/01/2020 0.5  0.0 - 1.9 % Final    Differential Method 10/01/2020 Automated   Final    Sodium 10/01/2020 137  136 - 145 mmol/L Final    Potassium 10/01/2020 5.6* 3.5 - 5.1 mmol/L Final    Chloride 10/01/2020 95  95 - 110 mmol/L Final    CO2 10/01/2020 24  23 - 29 mmol/L Final    Glucose 10/01/2020 107  70 - 110 mg/dL Final    BUN 10/01/2020 83* 8 - 23 mg/dL Final    Creatinine 10/01/2020 4.7* 0.5 - 1.4 mg/dL Final    Calcium 10/01/2020 7.8* 8.7 - 10.5 mg/dL Final    Total Protein 10/01/2020 6.0  6.0 - 8.4 g/dL Final    Albumin 10/01/2020 2.6* 3.5 - 5.2 g/dL Final    Total Bilirubin 10/01/2020 0.7  0.1 - 1.0 mg/dL Final    Alkaline Phosphatase 10/01/2020 94  55 - 135 U/L Final    AST 10/01/2020 20  10 - 40 U/L Final    ALT 10/01/2020 16  10 - 44 U/L Final    Anion Gap 10/01/2020 18* 8 - 16 mmol/L Final    eGFR if African American 10/01/2020 9.9* >60 mL/min/1.73 m^2 Final    eGFR if non African American 10/01/2020 8.6* >60 mL/min/1.73 m^2 Final    aPTT 10/01/2020 46.8* 23.6 - 33.3 sec Final    PT 10/01/2020 43.5* 10.6 - 14.8 sec Final    INR 10/01/2020 4.8   Final    Troponin I 10/01/2020 <0.030  <=0.040 ng/mL Final    BNP 10/01/2020 >4,500* 0 - 99 pg/mL Final    SARS-CoV-2 RNA, Amplification, Qual 10/01/2020 Negative  Negative Final    UNIT NUMBER 10/01/2020 [id number]   Final     Product Code 10/01/2020 [id number]   Final    DISPENSE STATUS 10/01/2020 TRANSFUSED   Final    CODING SYSTEM 10/01/2020 [id number]   Final    Unit Blood Type Code 10/01/2020 7300   Final    Unit Blood Type 10/01/2020 B POS   Final    Unit Expiration 10/01/2020 [id number]   Final    UNIT NUMBER 10/01/2020 [id number]   Final    Product Code 10/01/2020 [id number]   Final    DISPENSE STATUS 10/01/2020 TRANSFUSED   Final    CODING SYSTEM 10/01/2020 [id number]   Final    Unit Blood Type Code 10/01/2020 7300   Final    Unit Blood Type 10/01/2020 B POS   Final    Unit Expiration 10/01/2020 [id number]   Final    Group & Rh 10/01/2020 B POS   Final    Indirect Yasmin 10/01/2020 NEG   Final    Hemoglobin 10/01/2020 7.1* 12.0 - 16.0 g/dL Final    Hematocrit 10/01/2020 22.4* 37.0 - 48.5 % Final    Sodium 10/02/2020 136  136 - 145 mmol/L Final    Potassium 10/02/2020 4.1  3.5 - 5.1 mmol/L Final    Chloride 10/02/2020 100  95 - 110 mmol/L Final    CO2 10/02/2020 25  23 - 29 mmol/L Final    Glucose 10/02/2020 68* 70 - 110 mg/dL Final    BUN 10/02/2020 43* 8 - 23 mg/dL Final    Creatinine 10/02/2020 3.1* 0.5 - 1.4 mg/dL Final    Calcium 10/02/2020 7.8* 8.7 - 10.5 mg/dL Final    Anion Gap 10/02/2020 11  8 - 16 mmol/L Final    eGFR if African American 10/02/2020 16.3* >60 mL/min/1.73 m^2 Final    eGFR if non African American 10/02/2020 14.2* >60 mL/min/1.73 m^2 Final    WBC 10/02/2020 5.34  3.90 - 12.70 K/uL Final    RBC 10/02/2020 2.89* 4.00 - 5.40 M/uL Final    Hemoglobin 10/02/2020 8.4* 12.0 - 16.0 g/dL Final    Hematocrit 10/02/2020 26.2* 37.0 - 48.5 % Final    MCV 10/02/2020 91  82 - 98 fL Final    MCH 10/02/2020 29.1  27.0 - 31.0 pg Final    MCHC 10/02/2020 32.1  32.0 - 36.0 g/dL Final    RDW 10/02/2020 16.8* 11.5 - 14.5 % Final    Platelets 10/02/2020 147* 150 - 350 K/uL Final    MPV 10/02/2020 10.2  9.2 - 12.9 fL Final    Immature Granulocytes 10/02/2020 0.4  0.0 - 0.5 % Final    Gran #  (ANC) 10/02/2020 3.9  1.8 - 7.7 K/uL Final    Immature Grans (Abs) 10/02/2020 0.02  0.00 - 0.04 K/uL Final    Lymph # 10/02/2020 0.7* 1.0 - 4.8 K/uL Final    Mono # 10/02/2020 0.6  0.3 - 1.0 K/uL Final    Eos # 10/02/2020 0.1  0.0 - 0.5 K/uL Final    Baso # 10/02/2020 0.01  0.00 - 0.20 K/uL Final    nRBC 10/02/2020 0  0 /100 WBC Final    Gran % 10/02/2020 73.5* 38.0 - 73.0 % Final    Lymph % 10/02/2020 13.9* 18.0 - 48.0 % Final    Mono % 10/02/2020 10.7  4.0 - 15.0 % Final    Eosinophil % 10/02/2020 1.3  0.0 - 8.0 % Final    Basophil % 10/02/2020 0.2  0.0 - 1.9 % Final    Differential Method 10/02/2020 Automated   Final    PT 10/02/2020 22.0* 10.6 - 14.8 sec Final    INR 10/02/2020 2.0   Final    Hemoglobin 10/02/2020 9.6* 12.0 - 16.0 g/dL Final    Hematocrit 10/02/2020 29.5* 37.0 - 48.5 % Final    Hemoglobin 10/02/2020 8.8* 12.0 - 16.0 g/dL Final    Hematocrit 10/02/2020 27.6* 37.0 - 48.5 % Final    WBC 10/03/2020 5.30  3.90 - 12.70 K/uL Final    RBC 10/03/2020 3.02* 4.00 - 5.40 M/uL Final    Hemoglobin 10/03/2020 8.8* 12.0 - 16.0 g/dL Final    Hematocrit 10/03/2020 28.2* 37.0 - 48.5 % Final    MCV 10/03/2020 93  82 - 98 fL Final    MCH 10/03/2020 29.1  27.0 - 31.0 pg Final    MCHC 10/03/2020 31.2* 32.0 - 36.0 g/dL Final    RDW 10/03/2020 17.9* 11.5 - 14.5 % Final    Platelets 10/03/2020 155  150 - 350 K/uL Final    MPV 10/03/2020 10.8  9.2 - 12.9 fL Final    Immature Granulocytes 10/03/2020 0.4  0.0 - 0.5 % Final    Gran # (ANC) 10/03/2020 3.8  1.8 - 7.7 K/uL Final    Immature Grans (Abs) 10/03/2020 0.02  0.00 - 0.04 K/uL Final    Lymph # 10/03/2020 0.9* 1.0 - 4.8 K/uL Final    Mono # 10/03/2020 0.6  0.3 - 1.0 K/uL Final    Eos # 10/03/2020 0.1  0.0 - 0.5 K/uL Final    Baso # 10/03/2020 0.01  0.00 - 0.20 K/uL Final    nRBC 10/03/2020 0  0 /100 WBC Final    Gran % 10/03/2020 71.1  38.0 - 73.0 % Final    Lymph % 10/03/2020 16.4* 18.0 - 48.0 % Final    Mono % 10/03/2020 10.8   4.0 - 15.0 % Final    Eosinophil % 10/03/2020 1.1  0.0 - 8.0 % Final    Basophil % 10/03/2020 0.2  0.0 - 1.9 % Final    Differential Method 10/03/2020 Automated   Final    Sodium 10/03/2020 133* 136 - 145 mmol/L Final    Potassium 10/03/2020 4.5  3.5 - 5.1 mmol/L Final    Chloride 10/03/2020 98  95 - 110 mmol/L Final    CO2 10/03/2020 23  23 - 29 mmol/L Final    Glucose 10/03/2020 87  70 - 110 mg/dL Final    BUN 10/03/2020 25* 8 - 23 mg/dL Final    Creatinine 10/03/2020 3.3* 0.5 - 1.4 mg/dL Final    Calcium 10/03/2020 8.3* 8.7 - 10.5 mg/dL Final    Anion Gap 10/03/2020 12  8 - 16 mmol/L Final    eGFR if African American 10/03/2020 15.1* >60 mL/min/1.73 m^2 Final    eGFR if non African American 10/03/2020 13.1* >60 mL/min/1.73 m^2 Final    WBC 10/03/2020 5.30  3.90 - 12.70 K/uL Final    RBC 10/03/2020 3.02* 4.00 - 5.40 M/uL Final    Hemoglobin 10/03/2020 8.8* 12.0 - 16.0 g/dL Final    Hematocrit 10/03/2020 28.2* 37.0 - 48.5 % Final    MCV 10/03/2020 93  82 - 98 fL Final    MCH 10/03/2020 29.1  27.0 - 31.0 pg Final    MCHC 10/03/2020 31.2* 32.0 - 36.0 g/dL Final    RDW 10/03/2020 17.9* 11.5 - 14.5 % Final    Platelets 10/03/2020 155  150 - 350 K/uL Final    MPV 10/03/2020 10.8  9.2 - 12.9 fL Final    Immature Granulocytes 10/03/2020 0.4  0.0 - 0.5 % Final    Gran # (ANC) 10/03/2020 3.8  1.8 - 7.7 K/uL Final    Immature Grans (Abs) 10/03/2020 0.02  0.00 - 0.04 K/uL Final    Lymph # 10/03/2020 0.9* 1.0 - 4.8 K/uL Final    Mono # 10/03/2020 0.6  0.3 - 1.0 K/uL Final    Eos # 10/03/2020 0.1  0.0 - 0.5 K/uL Final    Baso # 10/03/2020 0.01  0.00 - 0.20 K/uL Final    nRBC 10/03/2020 0  0 /100 WBC Final    Gran % 10/03/2020 71.1  38.0 - 73.0 % Final    Lymph % 10/03/2020 16.4* 18.0 - 48.0 % Final    Mono % 10/03/2020 10.8  4.0 - 15.0 % Final    Eosinophil % 10/03/2020 1.1  0.0 - 8.0 % Final    Basophil % 10/03/2020 0.2  0.0 - 1.9 % Final    Differential Method 10/03/2020 Automated   Final     Magnesium 10/03/2020 1.9  1.6 - 2.6 mg/dL Final   Lab Visit on 09/30/2020   Component Date Value Ref Range Status    Group & Rh 09/30/2020 B POS   Final    Indirect Yasmin 09/30/2020 NEG   Final    UNIT NUMBER 09/30/2020 [id number]   Final    Product Code 09/30/2020 [id number]   Final    DISPENSE STATUS 09/30/2020 TRANSFUSED   Final    CODING SYSTEM 09/30/2020 [id number]   Final    Unit Blood Type Code 09/30/2020 7300   Final    Unit Blood Type 09/30/2020 B POS   Final    Unit Expiration 09/30/2020 [id number]   Final   Admission on 09/20/2020, Discharged on 09/23/2020   Component Date Value Ref Range Status    WBC 09/20/2020 7.61  3.90 - 12.70 K/uL Final    RBC 09/20/2020 3.31* 4.00 - 5.40 M/uL Final    Hemoglobin 09/20/2020 9.2* 12.0 - 16.0 g/dL Final    Hematocrit 09/20/2020 29.8* 37.0 - 48.5 % Final    MCV 09/20/2020 90  82 - 98 fL Final    MCH 09/20/2020 27.8  27.0 - 31.0 pg Final    MCHC 09/20/2020 30.9* 32.0 - 36.0 g/dL Final    RDW 09/20/2020 17.3* 11.5 - 14.5 % Final    Platelets 09/20/2020 291  150 - 350 K/uL Final    MPV 09/20/2020 10.6  9.2 - 12.9 fL Final    Immature Granulocytes 09/20/2020 0.4  0.0 - 0.5 % Final    Gran # (ANC) 09/20/2020 6.0  1.8 - 7.7 K/uL Final    Immature Grans (Abs) 09/20/2020 0.03  0.00 - 0.04 K/uL Final    Lymph # 09/20/2020 1.0  1.0 - 4.8 K/uL Final    Mono # 09/20/2020 0.5  0.3 - 1.0 K/uL Final    Eos # 09/20/2020 0.1  0.0 - 0.5 K/uL Final    Baso # 09/20/2020 0.03  0.00 - 0.20 K/uL Final    nRBC 09/20/2020 0  0 /100 WBC Final    Gran % 09/20/2020 78.2* 38.0 - 73.0 % Final    Lymph % 09/20/2020 12.5* 18.0 - 48.0 % Final    Mono % 09/20/2020 7.1  4.0 - 15.0 % Final    Eosinophil % 09/20/2020 1.4  0.0 - 8.0 % Final    Basophil % 09/20/2020 0.4  0.0 - 1.9 % Final    Differential Method 09/20/2020 Automated   Final    Sodium 09/20/2020 141  136 - 145 mmol/L Final    Potassium 09/20/2020 4.8  3.5 - 5.1 mmol/L Final    Chloride 09/20/2020 94*  95 - 110 mmol/L Final    CO2 09/20/2020 28  23 - 29 mmol/L Final    Glucose 09/20/2020 94  70 - 110 mg/dL Final    BUN 09/20/2020 84* 8 - 23 mg/dL Final    Creatinine 09/20/2020 6.7* 0.5 - 1.4 mg/dL Final    Calcium 09/20/2020 8.6* 8.7 - 10.5 mg/dL Final    Total Protein 09/20/2020 7.7  6.0 - 8.4 g/dL Final    Albumin 09/20/2020 3.3* 3.5 - 5.2 g/dL Final    Total Bilirubin 09/20/2020 0.7  0.1 - 1.0 mg/dL Final    Alkaline Phosphatase 09/20/2020 128  55 - 135 U/L Final    AST 09/20/2020 36  10 - 40 U/L Final    ALT 09/20/2020 19  10 - 44 U/L Final    Anion Gap 09/20/2020 19* 8 - 16 mmol/L Final    eGFR if African American 09/20/2020 6.4* >60 mL/min/1.73 m^2 Final    eGFR if non African American 09/20/2020 5.6* >60 mL/min/1.73 m^2 Final    BNP 09/20/2020 >4,500* 0 - 99 pg/mL Final    Troponin I 09/20/2020 0.033  <=0.040 ng/mL Final    PT 09/20/2020 29.2* 10.6 - 14.8 sec Final    INR 09/20/2020 2.9   Final    SARS-CoV-2 RNA, Amplification, Qual 09/20/2020 Negative  Negative Final    Blood Culture, Routine 09/20/2020 No growth after 5 days.   Final    Blood Culture, Routine 09/20/2020 No growth after 5 days.   Final    Lactate (Lactic Acid) 09/20/2020 1.1  0.5 - 1.9 mmol/L Final    POC PH 09/21/2020 7.398  7.35 - 7.45 Final    POC PCO2 09/21/2020 47.1* 35 - 45 mmHg Final    POC PO2 09/21/2020 44  40 - 60 mmHg Final    POC HCO3 09/21/2020 29.1* 24 - 28 mmol/L Final    POC BE 09/21/2020 4  -2 to 2 mmol/L Final    POC SATURATED O2 09/21/2020 79* 95 - 100 % Final    POC TCO2 09/21/2020 30* 24 - 29 mmol/L Final    Sample 09/21/2020 VENOUS   Final    Site 09/21/2020 LR   Final    Allens Test 09/21/2020 N/A   Final    DelSys 09/21/2020 CPAP/BiPAP   Final    Mode 09/21/2020 BiPAP   Final    FiO2 09/21/2020 30   Final    Sp02 09/21/2020 97   Final    IP 09/21/2020 14   Final    EP 09/21/2020 8   Final    WBC 09/21/2020 8.41  3.90 - 12.70 K/uL Final    RBC 09/21/2020 3.49* 4.00 - 5.40 M/uL  Final    Hemoglobin 09/21/2020 9.6* 12.0 - 16.0 g/dL Final    Hematocrit 09/21/2020 31.5* 37.0 - 48.5 % Final    MCV 09/21/2020 90  82 - 98 fL Final    MCH 09/21/2020 27.5  27.0 - 31.0 pg Final    MCHC 09/21/2020 30.5* 32.0 - 36.0 g/dL Final    RDW 09/21/2020 17.4* 11.5 - 14.5 % Final    Platelets 09/21/2020 307  150 - 350 K/uL Final    MPV 09/21/2020 10.9  9.2 - 12.9 fL Final    Immature Granulocytes 09/21/2020 0.4  0.0 - 0.5 % Final    Gran # (ANC) 09/21/2020 7.9* 1.8 - 7.7 K/uL Final    Immature Grans (Abs) 09/21/2020 0.03  0.00 - 0.04 K/uL Final    Lymph # 09/21/2020 0.4* 1.0 - 4.8 K/uL Final    Mono # 09/21/2020 0.1* 0.3 - 1.0 K/uL Final    Eos # 09/21/2020 0.0  0.0 - 0.5 K/uL Final    Baso # 09/21/2020 0.02  0.00 - 0.20 K/uL Final    nRBC 09/21/2020 0  0 /100 WBC Final    Gran % 09/21/2020 94.4* 38.0 - 73.0 % Final    Lymph % 09/21/2020 4.3* 18.0 - 48.0 % Final    Mono % 09/21/2020 0.7* 4.0 - 15.0 % Final    Eosinophil % 09/21/2020 0.0  0.0 - 8.0 % Final    Basophil % 09/21/2020 0.2  0.0 - 1.9 % Final    Differential Method 09/21/2020 Automated   Final    Troponin I 09/21/2020 0.033  <=0.040 ng/mL Final    Sodium 09/21/2020 140  136 - 145 mmol/L Final    Potassium 09/21/2020 5.2* 3.5 - 5.1 mmol/L Final    Chloride 09/21/2020 95  95 - 110 mmol/L Final    CO2 09/21/2020 25  23 - 29 mmol/L Final    Glucose 09/21/2020 158* 70 - 110 mg/dL Final    BUN 09/21/2020 92* 8 - 23 mg/dL Final    Creatinine 09/21/2020 6.8* 0.5 - 1.4 mg/dL Final    Calcium 09/21/2020 8.8  8.7 - 10.5 mg/dL Final    Total Protein 09/21/2020 8.2  6.0 - 8.4 g/dL Final    Albumin 09/21/2020 3.4* 3.5 - 5.2 g/dL Final    Total Bilirubin 09/21/2020 0.9  0.1 - 1.0 mg/dL Final    Alkaline Phosphatase 09/21/2020 141* 55 - 135 U/L Final    AST 09/21/2020 43* 10 - 40 U/L Final    ALT 09/21/2020 24  10 - 44 U/L Final    Anion Gap 09/21/2020 20* 8 - 16 mmol/L Final    eGFR if African American 09/21/2020 6.3* >60  mL/min/1.73 m^2 Final    eGFR if non African American 09/21/2020 5.5* >60 mL/min/1.73 m^2 Final    TSH 09/21/2020 3.210  0.340 - 5.600 uIU/mL Final    Magnesium 09/21/2020 1.9  1.6 - 2.6 mg/dL Final    Phosphorus 09/21/2020 2.4* 2.7 - 4.5 mg/dL Final    BNP 09/21/2020 >4,500* 0 - 99 pg/mL Final    PT 09/21/2020 31.7* 10.6 - 14.8 sec Final    INR 09/21/2020 3.2   Final    WBC 09/22/2020 11.51  3.90 - 12.70 K/uL Final    RBC 09/22/2020 2.97* 4.00 - 5.40 M/uL Final    Hemoglobin 09/22/2020 8.2* 12.0 - 16.0 g/dL Final    Hematocrit 09/22/2020 27.2* 37.0 - 48.5 % Final    MCV 09/22/2020 92  82 - 98 fL Final    MCH 09/22/2020 27.6  27.0 - 31.0 pg Final    MCHC 09/22/2020 30.1* 32.0 - 36.0 g/dL Final    RDW 09/22/2020 18.0* 11.5 - 14.5 % Final    Platelets 09/22/2020 268  150 - 350 K/uL Final    MPV 09/22/2020 10.9  9.2 - 12.9 fL Final    Immature Granulocytes 09/22/2020 0.4  0.0 - 0.5 % Final    Gran # (ANC) 09/22/2020 9.8* 1.8 - 7.7 K/uL Final    Immature Grans (Abs) 09/22/2020 0.05* 0.00 - 0.04 K/uL Final    Lymph # 09/22/2020 0.8* 1.0 - 4.8 K/uL Final    Mono # 09/22/2020 0.9  0.3 - 1.0 K/uL Final    Eos # 09/22/2020 0.0  0.0 - 0.5 K/uL Final    Baso # 09/22/2020 0.02  0.00 - 0.20 K/uL Final    nRBC 09/22/2020 0  0 /100 WBC Final    Gran % 09/22/2020 85.5* 38.0 - 73.0 % Final    Lymph % 09/22/2020 6.5* 18.0 - 48.0 % Final    Mono % 09/22/2020 7.4  4.0 - 15.0 % Final    Eosinophil % 09/22/2020 0.0  0.0 - 8.0 % Final    Basophil % 09/22/2020 0.2  0.0 - 1.9 % Final    Differential Method 09/22/2020 Automated   Final    Glucose 09/22/2020 155* 70 - 110 mg/dL Final    Sodium 09/22/2020 137  136 - 145 mmol/L Final    Potassium 09/22/2020 4.5  3.5 - 5.1 mmol/L Final    Chloride 09/22/2020 95  95 - 110 mmol/L Final    CO2 09/22/2020 22* 23 - 29 mmol/L Final    BUN 09/22/2020 87* 8 - 23 mg/dL Final    Calcium 09/22/2020 8.8  8.7 - 10.5 mg/dL Final    Creatinine 09/22/2020 5.5* 0.5 - 1.4  mg/dL Final    Albumin 09/22/2020 3.0* 3.5 - 5.2 g/dL Final    Phosphorus 09/22/2020 3.2  2.7 - 4.5 mg/dL Final    eGFR if African American 09/22/2020 8.2* >60 mL/min/1.73 m^2 Final    eGFR if non African American 09/22/2020 7.1* >60 mL/min/1.73 m^2 Final    Anion Gap 09/22/2020 20* 8 - 16 mmol/L Final    Magnesium 09/22/2020 1.9  1.6 - 2.6 mg/dL Final    PT 09/22/2020 30.4* 10.6 - 14.8 sec Final    INR 09/22/2020 3.1   Final    Procalcitonin 09/22/2020 0.39  0.00 - 0.50 ng/mL Final    Glucose 09/23/2020 83  70 - 110 mg/dL Final    Sodium 09/23/2020 137  136 - 145 mmol/L Final    Potassium 09/23/2020 4.4  3.5 - 5.1 mmol/L Final    Chloride 09/23/2020 98  95 - 110 mmol/L Final    CO2 09/23/2020 23  23 - 29 mmol/L Final    BUN 09/23/2020 103* 8 - 23 mg/dL Final    Calcium 09/23/2020 8.5* 8.7 - 10.5 mg/dL Final    Creatinine 09/23/2020 6.5* 0.5 - 1.4 mg/dL Final    Albumin 09/23/2020 2.9* 3.5 - 5.2 g/dL Final    Phosphorus 09/23/2020 2.7  2.7 - 4.5 mg/dL Final    eGFR if  09/23/2020 6.7* >60 mL/min/1.73 m^2 Final    eGFR if non African American 09/23/2020 5.8* >60 mL/min/1.73 m^2 Final    Anion Gap 09/23/2020 16  8 - 16 mmol/L Final    WBC 09/23/2020 9.24  3.90 - 12.70 K/uL Final    RBC 09/23/2020 3.17* 4.00 - 5.40 M/uL Final    Hemoglobin 09/23/2020 8.8* 12.0 - 16.0 g/dL Final    Hematocrit 09/23/2020 28.9* 37.0 - 48.5 % Final    MCV 09/23/2020 91  82 - 98 fL Final    MCH 09/23/2020 27.8  27.0 - 31.0 pg Final    MCHC 09/23/2020 30.4* 32.0 - 36.0 g/dL Final    RDW 09/23/2020 18.6* 11.5 - 14.5 % Final    Platelets 09/23/2020 273  150 - 350 K/uL Final    MPV 09/23/2020 10.8  9.2 - 12.9 fL Final    Immature Granulocytes 09/23/2020 0.5  0.0 - 0.5 % Final    Gran # (ANC) 09/23/2020 7.3  1.8 - 7.7 K/uL Final    Immature Grans (Abs) 09/23/2020 0.05* 0.00 - 0.04 K/uL Final    Lymph # 09/23/2020 1.0  1.0 - 4.8 K/uL Final    Mono # 09/23/2020 0.8  0.3 - 1.0 K/uL Final    Eos  # 09/23/2020 0.1  0.0 - 0.5 K/uL Final    Baso # 09/23/2020 0.02  0.00 - 0.20 K/uL Final    nRBC 09/23/2020 0  0 /100 WBC Final    Gran % 09/23/2020 79.4* 38.0 - 73.0 % Final    Lymph % 09/23/2020 11.0* 18.0 - 48.0 % Final    Mono % 09/23/2020 8.1  4.0 - 15.0 % Final    Eosinophil % 09/23/2020 0.8  0.0 - 8.0 % Final    Basophil % 09/23/2020 0.2  0.0 - 1.9 % Final    Differential Method 09/23/2020 Automated   Final   Admission on 09/05/2020, Discharged on 09/06/2020   Component Date Value Ref Range Status    WBC 09/05/2020 7.02  3.90 - 12.70 K/uL Final    RBC 09/05/2020 3.71* 4.00 - 5.40 M/uL Final    Hemoglobin 09/05/2020 10.6* 12.0 - 16.0 g/dL Final    Hematocrit 09/05/2020 34.9* 37.0 - 48.5 % Final    MCV 09/05/2020 94  82 - 98 fL Final    MCH 09/05/2020 28.6  27.0 - 31.0 pg Final    MCHC 09/05/2020 30.4* 32.0 - 36.0 g/dL Final    RDW 09/05/2020 16.0* 11.5 - 14.5 % Final    Platelets 09/05/2020 263  150 - 350 K/uL Final    MPV 09/05/2020 10.4  9.2 - 12.9 fL Final    Immature Granulocytes 09/05/2020 0.4  0.0 - 0.5 % Final    Gran # (ANC) 09/05/2020 5.7  1.8 - 7.7 K/uL Final    Immature Grans (Abs) 09/05/2020 0.03  0.00 - 0.04 K/uL Final    Lymph # 09/05/2020 0.8* 1.0 - 4.8 K/uL Final    Mono # 09/05/2020 0.5  0.3 - 1.0 K/uL Final    Eos # 09/05/2020 0.1  0.0 - 0.5 K/uL Final    Baso # 09/05/2020 0.03  0.00 - 0.20 K/uL Final    nRBC 09/05/2020 0  0 /100 WBC Final    Gran % 09/05/2020 81.1* 38.0 - 73.0 % Final    Lymph % 09/05/2020 11.0* 18.0 - 48.0 % Final    Mono % 09/05/2020 6.4  4.0 - 15.0 % Final    Eosinophil % 09/05/2020 0.7  0.0 - 8.0 % Final    Basophil % 09/05/2020 0.4  0.0 - 1.9 % Final    Differential Method 09/05/2020 Automated   Final    Sodium 09/05/2020 139  136 - 145 mmol/L Final    Potassium 09/05/2020 4.3  3.5 - 5.1 mmol/L Final    Chloride 09/05/2020 94* 95 - 110 mmol/L Final    CO2 09/05/2020 33* 23 - 29 mmol/L Final    Glucose 09/05/2020 83  70 - 110 mg/dL  Final    BUN 09/05/2020 35* 8 - 23 mg/dL Final    Creatinine 09/05/2020 5.1* 0.5 - 1.4 mg/dL Final    Calcium 09/05/2020 8.6* 8.7 - 10.5 mg/dL Final    Total Protein 09/05/2020 7.7  6.0 - 8.4 g/dL Final    Albumin 09/05/2020 3.1* 3.5 - 5.2 g/dL Final    Total Bilirubin 09/05/2020 1.2* 0.1 - 1.0 mg/dL Final    Alkaline Phosphatase 09/05/2020 140* 55 - 135 U/L Final    AST 09/05/2020 43* 10 - 40 U/L Final    ALT 09/05/2020 24  10 - 44 U/L Final    Anion Gap 09/05/2020 12  8 - 16 mmol/L Final    eGFR if African American 09/05/2020 8.9* >60 mL/min/1.73 m^2 Final    eGFR if non African American 09/05/2020 7.8* >60 mL/min/1.73 m^2 Final    BNP 09/05/2020 >4,500* 0 - 99 pg/mL Final    Troponin I 09/05/2020 0.036  <=0.040 ng/mL Final    PT 09/05/2020 15.7* 10.6 - 14.8 sec Final    INR 09/05/2020 1.3   Final    SARS-CoV-2 RNA, Amplification, Qual 09/05/2020 Negative  Negative Final    Troponin I 09/05/2020 0.031  <=0.040 ng/mL Final    Magnesium 09/05/2020 1.9  1.6 - 2.6 mg/dL Final    Hep B S Ab 09/05/2020 Reactive   Final    Hepatitis B Surface Ag 09/05/2020 Negative  Negative Final    Sodium 09/06/2020 139  136 - 145 mmol/L Final    Potassium 09/06/2020 4.4  3.5 - 5.1 mmol/L Final    Chloride 09/06/2020 95  95 - 110 mmol/L Final    CO2 09/06/2020 30* 23 - 29 mmol/L Final    Glucose 09/06/2020 52* 70 - 110 mg/dL Final    BUN 09/06/2020 42* 8 - 23 mg/dL Final    Creatinine 09/06/2020 5.8* 0.5 - 1.4 mg/dL Final    Calcium 09/06/2020 8.4* 8.7 - 10.5 mg/dL Final    Total Protein 09/06/2020 6.8  6.0 - 8.4 g/dL Final    Albumin 09/06/2020 2.8* 3.5 - 5.2 g/dL Final    Total Bilirubin 09/06/2020 1.4* 0.1 - 1.0 mg/dL Final    Alkaline Phosphatase 09/06/2020 129  55 - 135 U/L Final    AST 09/06/2020 27  10 - 40 U/L Final    ALT 09/06/2020 18  10 - 44 U/L Final    Anion Gap 09/06/2020 14  8 - 16 mmol/L Final    eGFR if African American 09/06/2020 7.7* >60 mL/min/1.73 m^2 Final    eGFR if non   09/06/2020 6.6* >60 mL/min/1.73 m^2 Final    Magnesium 09/06/2020 1.8  1.6 - 2.6 mg/dL Final    Phosphorus 09/06/2020 3.9  2.7 - 4.5 mg/dL Final    WBC 09/06/2020 4.93  3.90 - 12.70 K/uL Final    RBC 09/06/2020 3.38* 4.00 - 5.40 M/uL Final    Hemoglobin 09/06/2020 9.7* 12.0 - 16.0 g/dL Final    Hematocrit 09/06/2020 31.6* 37.0 - 48.5 % Final    MCV 09/06/2020 94  82 - 98 fL Final    MCH 09/06/2020 28.7  27.0 - 31.0 pg Final    MCHC 09/06/2020 30.7* 32.0 - 36.0 g/dL Final    RDW 09/06/2020 16.2* 11.5 - 14.5 % Final    Platelets 09/06/2020 221  150 - 350 K/uL Final    MPV 09/06/2020 11.3  9.2 - 12.9 fL Final    Immature Granulocytes 09/06/2020 0.2  0.0 - 0.5 % Final    Gran # (ANC) 09/06/2020 3.4  1.8 - 7.7 K/uL Final    Immature Grans (Abs) 09/06/2020 0.01  0.00 - 0.04 K/uL Final    Lymph # 09/06/2020 0.9* 1.0 - 4.8 K/uL Final    Mono # 09/06/2020 0.5  0.3 - 1.0 K/uL Final    Eos # 09/06/2020 0.1  0.0 - 0.5 K/uL Final    Baso # 09/06/2020 0.03  0.00 - 0.20 K/uL Final    nRBC 09/06/2020 0  0 /100 WBC Final    Gran % 09/06/2020 68.2  38.0 - 73.0 % Final    Lymph % 09/06/2020 18.9  18.0 - 48.0 % Final    Mono % 09/06/2020 9.7  4.0 - 15.0 % Final    Eosinophil % 09/06/2020 2.4  0.0 - 8.0 % Final    Basophil % 09/06/2020 0.6  0.0 - 1.9 % Final    Differential Method 09/06/2020 Automated   Final    PT 09/06/2020 16.1* 10.6 - 14.8 sec Final    INR 09/06/2020 1.4   Final    POC Glucose 09/06/2020 68* 70 - 110 Final   No results displayed because visit has over 200 results.      There may be more visits with results that are not included.       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    both legs    CHF (congestive heart failure)     Encounter for blood transfusion 03/2016    Gout     Hemodialysis access, AV graft     Hypertension     Mitral valve regurgitation     Osteoarthritis     Pancreatitis     Peripheral  vascular disease     Pneumonia 09/09/2017    Renal failure      Past Surgical History:   Procedure Laterality Date    ACHILLES TENDON SURGERY Right     ANGIOGRAPHY OF ARTERIOVENOUS SHUNT Right 1/7/2020    Procedure: Fistulogram with Possible Intervention;  Surgeon: Joseph Loyola MD;  Location: [facility] CATH/EP LAB;  Service: Cardiology;  Laterality: Right;    ANGIOGRAPHY OF LOWER EXTREMITY Left 3/18/2020    Procedure: Angiogram Extremity Unilateral;  Surgeon: Ali Khoobehi, MD;  Location: [facility] CATH/EP LAB;  Service: Cardiology;  Laterality: Left;    APPENDECTOMY      CARDIAC CATHETERIZATION  07/03/2017    CHOLECYSTECTOMY      COLONOSCOPY Left 10/4/2020    Procedure: COLONOSCOPY;  Surgeon: Jordan Kilpatrick MD;  Location: [facility] ENDO;  Service: Endoscopy;  Laterality: Left;    ESOPHAGOGASTRODUODENOSCOPY Left 8/17/2020    Procedure: EGD (ESOPHAGOGASTRODUODENOSCOPY);  Surgeon: Talat Trevizo MD;  Location: [facility] ENDO;  Service: Endoscopy;  Laterality: Left;    ESOPHAGOGASTRODUODENOSCOPY Left 10/2/2020    Procedure: EGD (ESOPHAGOGASTRODUODENOSCOPY);  Surgeon: Talat Trevizo MD;  Location: [facility] ENDO;  Service: Endoscopy;  Laterality: Left;    heal surgery Right     HYSTERECTOMY      INSERTION OF STENT INTO PERIPHERAL VESSEL N/A 1/7/2020    Procedure: INSERTION, STENT, VESSEL, PERIPHERAL;  Surgeon: Joseph Loyola MD;  Location: [facility] CATH/EP LAB;  Service: Cardiology;  Laterality: N/A;    MITRAL VALVE REPLACEMENT      PARATHYROIDECTOMY      PARATHYROIDECTOMY  07/13/2017    PERCUTANEOUS TRANSLUMINAL ANGIOPLASTY OF ARTERIOVENOUS FISTULA N/A 1/7/2020    Procedure: PTA, AV FISTULA;  Surgeon: Joseph Loyola MD;  Location: [facility] CATH/EP LAB;  Service: Cardiology;  Laterality: N/A;    PERITONEAL CATHETER INSERTION      RENAL BIOPSY      SMALL BOWEL ENTEROSCOPY N/A 12/2/2020    Procedure: ENTEROSCOPY;  Surgeon: Sudheer Brown III, MD;  Location: [facility] ENDO;  Service: Endoscopy;  Laterality: N/A;     vocal cord nodule      WOUND DEBRIDEMENT Left 7/13/2020    Procedure: DEBRIDEMENT, WOUND;  Surgeon: Carlos Elam III, MD;  Location: [facility] OR;  Service: General;  Laterality: Left;     Family History   Problem Relation Age of Onset    Heart disease Sister     Early death Sister         heart as baby    Heart disease Maternal Grandfather     Diabetes Mother     Hypertension Mother     Heart failure Mother     Heart disease Mother     Arthritis Mother     Diabetes Father     Early death Sister         infant       Marital Status:   Alcohol History:  reports no history of alcohol use.  Tobacco History:  reports that she has been smoking cigarettes. She has a 22.50 pack-year smoking history. She has never used smokeless tobacco.  Drug History:  reports no history of drug use.    Review of patient's allergies indicates:  No Known Allergies    Current Outpatient Medications:     calcium acetate (PHOSLO) 667 mg capsule, Take 667 mg by mouth once daily. , Disp: , Rfl:     diltiaZEM (DILACOR XR) 240 MG CDCR, Take 1 capsule (240 mg total) by mouth once daily., Disp: 30 capsule, Rfl: 11    isosorbide mononitrate (IMDUR) 30 MG 24 hr tablet, Take 30 mg by mouth once daily., Disp: , Rfl:     losartan (COZAAR) 25 MG tablet, Take 25 mg by mouth once daily., Disp: , Rfl:     magnesium oxide (MAG-OX) 400 mg (241.3 mg magnesium) tablet, Take 1 tablet by mouth once daily (Patient taking differently: Take 400 mg by mouth once daily. ), Disp: 30 tablet, Rfl: 0    ondansetron (ZOFRAN-ODT) 4 MG TbDL, Take 4 mg by mouth every 6 (six) hours as needed., Disp: , Rfl:     pantoprazole (PROTONIX) 40 MG tablet, Take 1 tablet (40 mg total) by mouth once daily., Disp: 90 tablet, Rfl: 0    traMADoL (ULTRAM) 50 mg tablet, Take 50 mg by mouth 3 (three) times a week., Disp: , Rfl:     albuterol sulfate 2.5 mg/0.5 mL Nebu, Take 2.5 mg by nebulization every 4 (four) hours as needed. Rescue, Disp: 20 each, Rfl: 1     "cloNIDine (CATAPRES) 0.1 MG tablet, Take 1 tablet (0.1 mg total) by mouth 2 (two) times daily as needed (for BP >160/90)., Disp: 90 tablet, Rfl: 1    folic acid/vit B complex and C (JENNIFER-KODAK ORAL), Take 1 tablet by mouth once daily., Disp: , Rfl:     hydrALAZINE (APRESOLINE) 50 MG tablet, Take 1 tablet (50 mg total) by mouth every 8 (eight) hours., Disp: 90 tablet, Rfl: 11    levalbuterol (XOPENEX) 0.63 mg/3 mL nebulizer solution, Take 3 mLs (0.63 mg total) by nebulization every 8 (eight) hours as needed for Wheezing or Shortness of Breath. Rescue, Disp: 1 Box, Rfl: 4    metoprolol succinate (TOPROL-XL) 25 MG 24 hr tablet, Take 1 tablet by mouth nightly, Disp: 30 tablet, Rfl: 2    nicotine (NICODERM CQ) 14 mg/24 hr, Place 1 patch onto the skin once daily., Disp: 30 patch, Rfl: 0    oxyCODONE-acetaminophen (PERCOCET) 5-325 mg per tablet, Take 1 tablet by mouth every 6 (six) hours as needed for Pain., Disp: , Rfl:     wheelchair An, 1 Device by Misc.(Non-Drug; Combo Route) route as needed (needed for transport)., Disp: , Rfl: 0    zinc sulfate 220 mg Tab tablet, Take 220 mg by mouth every other day., Disp: , Rfl:     Review of Systems   Constitutional: Positive for fatigue.   HENT: Positive for congestion.    Respiratory: Positive for cough and shortness of breath.    Cardiovascular: Negative for palpitations.   Gastrointestinal: Negative for constipation and diarrhea.   Musculoskeletal: Negative for arthralgias.   Psychiatric/Behavioral: Negative for agitation.        Objective:      Vitals:    11/30/20 1154   BP: 138/84   Pulse: 72   Weight: 61.2 kg (135 lb)   Height: 5' 5" (1.651 m)     Physical Exam  Constitutional:       Comments: Frail, chronically ill-appearing female seated in wheelchair.  Oxygen in place nasal cannula   HENT:      Head: Normocephalic and atraumatic.      Nose: Nose normal.      Mouth/Throat:      Mouth: Mucous membranes are moist.   Eyes:      Extraocular Movements: Extraocular " movements intact.      Pupils: Pupils are equal, round, and reactive to light.   Cardiovascular:      Rate and Rhythm: Regular rhythm.      Heart sounds: Murmur present.   Pulmonary:      Effort: Pulmonary effort is normal.   Musculoskeletal:         General: No swelling.   Skin:     General: Skin is dry.   Neurological:      Mental Status: She is alert and oriented to person, place, and time. Mental status is at baseline.      Cranial Nerves: No cranial nerve deficit.      Motor: Weakness present.   Psychiatric:         Mood and Affect: Mood normal.         Behavior: Behavior normal.         Assessment:       1. Atrial fibrillation with RVR    2. Chronic respiratory failure with hypoxia    3. Physical debility    4. Essential hypertension    5. Pulmonary hypertension    6. Chronic diastolic heart failure    7. ESRD (end stage renal disease) on HD M,W, F    8. DNR (do not resuscitate)    9. Anemia due to chronic kidney disease, on chronic dialysis    10. Chronic obstructive pulmonary disease, unspecified COPD type    11. Gastrointestinal hemorrhage, unspecified gastrointestinal hemorrhage type         Plan:       Atrial fibrillation with RVR    Chronic respiratory failure with hypoxia  -     WHEELCHAIR FOR HOME USE    Physical debility  -     WHEELCHAIR FOR HOME USE    Essential hypertension    Pulmonary hypertension    Chronic diastolic heart failure    ESRD (end stage renal disease) on HD M,W, F    DNR (do not resuscitate)    Anemia due to chronic kidney disease, on chronic dialysis    Chronic obstructive pulmonary disease, unspecified COPD type  -     levalbuterol (XOPENEX) 0.63 mg/3 mL nebulizer solution; Take 3 mLs (0.63 mg total) by nebulization every 8 (eight) hours as needed for Wheezing or Shortness of Breath. Rescue  Dispense: 1 Box; Refill: 4    Gastrointestinal hemorrhage, unspecified gastrointestinal hemorrhage type      Follow up in about 3 months (around 2/28/2021).

## 2020-12-01 ENCOUNTER — HOSPITAL ENCOUNTER (INPATIENT)
Facility: HOSPITAL | Age: 75
LOS: 2 days | Discharge: HOME HEALTH | DRG: 377 | End: 2020-12-03
Attending: EMERGENCY MEDICINE | Admitting: INTERNAL MEDICINE
Payer: MEDICARE

## 2020-12-01 DIAGNOSIS — D64.9 ANEMIA, UNSPECIFIED TYPE: Primary | ICD-10-CM

## 2020-12-01 DIAGNOSIS — R06.02 SHORTNESS OF BREATH: ICD-10-CM

## 2020-12-01 DIAGNOSIS — K92.2 GASTROINTESTINAL HEMORRHAGE, UNSPECIFIED GASTROINTESTINAL HEMORRHAGE TYPE: ICD-10-CM

## 2020-12-01 LAB
ABO + RH BLD: NORMAL
ALBUMIN SERPL BCP-MCNC: 2.9 G/DL (ref 3.5–5.2)
ALP SERPL-CCNC: 102 U/L (ref 55–135)
ALT SERPL W/O P-5'-P-CCNC: 9 U/L (ref 10–44)
ANION GAP SERPL CALC-SCNC: 16 MMOL/L (ref 8–16)
AST SERPL-CCNC: 14 U/L (ref 10–40)
BASOPHILS # BLD AUTO: 0.03 K/UL (ref 0–0.2)
BASOPHILS NFR BLD: 0.4 % (ref 0–1.9)
BILIRUB SERPL-MCNC: 0.7 MG/DL (ref 0.1–1)
BLD GP AB SCN CELLS X3 SERPL QL: NORMAL
BLD PROD TYP BPU: NORMAL
BLD PROD TYP BPU: NORMAL
BLOOD UNIT EXPIRATION DATE: NORMAL
BLOOD UNIT EXPIRATION DATE: NORMAL
BLOOD UNIT TYPE CODE: 7300
BLOOD UNIT TYPE CODE: 7300
BLOOD UNIT TYPE: NORMAL
BLOOD UNIT TYPE: NORMAL
BNP SERPL-MCNC: >4500 PG/ML (ref 0–99)
BUN SERPL-MCNC: 79 MG/DL (ref 8–23)
CALCIUM SERPL-MCNC: 8 MG/DL (ref 8.7–10.5)
CHLORIDE SERPL-SCNC: 97 MMOL/L (ref 95–110)
CO2 SERPL-SCNC: 26 MMOL/L (ref 23–29)
CODING SYSTEM: NORMAL
CODING SYSTEM: NORMAL
CREAT SERPL-MCNC: 6.1 MG/DL (ref 0.5–1.4)
DIFFERENTIAL METHOD: ABNORMAL
DISPENSE STATUS: NORMAL
DISPENSE STATUS: NORMAL
EOSINOPHIL # BLD AUTO: 0.1 K/UL (ref 0–0.5)
EOSINOPHIL NFR BLD: 1.5 % (ref 0–8)
ERYTHROCYTE [DISTWIDTH] IN BLOOD BY AUTOMATED COUNT: 19.9 % (ref 11.5–14.5)
EST. GFR  (AFRICAN AMERICAN): 7.2 ML/MIN/1.73 M^2
EST. GFR  (NON AFRICAN AMERICAN): 6.2 ML/MIN/1.73 M^2
GLUCOSE SERPL-MCNC: 83 MG/DL (ref 70–110)
HCT VFR BLD AUTO: 21.7 % (ref 37–48.5)
HGB BLD-MCNC: 6.3 G/DL (ref 12–16)
IMM GRANULOCYTES # BLD AUTO: 0.06 K/UL (ref 0–0.04)
IMM GRANULOCYTES NFR BLD AUTO: 0.8 % (ref 0–0.5)
INR PPP: 1.4
LACTATE SERPL-SCNC: 1 MMOL/L (ref 0.5–1.9)
LYMPHOCYTES # BLD AUTO: 1.3 K/UL (ref 1–4.8)
LYMPHOCYTES NFR BLD: 16.6 % (ref 18–48)
MCH RBC QN AUTO: 27 PG (ref 27–31)
MCHC RBC AUTO-ENTMCNC: 29 G/DL (ref 32–36)
MCV RBC AUTO: 93 FL (ref 82–98)
MONOCYTES # BLD AUTO: 0.7 K/UL (ref 0.3–1)
MONOCYTES NFR BLD: 9.3 % (ref 4–15)
NEUTROPHILS # BLD AUTO: 5.4 K/UL (ref 1.8–7.7)
NEUTROPHILS NFR BLD: 71.4 % (ref 38–73)
NRBC BLD-RTO: 0 /100 WBC
NUM UNITS TRANS PACKED RBC: NORMAL
NUM UNITS TRANS PACKED RBC: NORMAL
PLATELET # BLD AUTO: 277 K/UL (ref 150–350)
PMV BLD AUTO: 10.4 FL (ref 9.2–12.9)
POTASSIUM SERPL-SCNC: 5.1 MMOL/L (ref 3.5–5.1)
PROT SERPL-MCNC: 7.9 G/DL (ref 6–8.4)
PROTHROMBIN TIME: 16.3 SEC (ref 10.6–14.8)
RBC # BLD AUTO: 2.33 M/UL (ref 4–5.4)
SARS-COV-2 RDRP RESP QL NAA+PROBE: NEGATIVE
SODIUM SERPL-SCNC: 139 MMOL/L (ref 136–145)
TROPONIN I SERPL DL<=0.01 NG/ML-MCNC: 0.03 NG/ML
WBC # BLD AUTO: 7.51 K/UL (ref 3.9–12.7)

## 2020-12-01 PROCEDURE — 25000003 PHARM REV CODE 250

## 2020-12-01 PROCEDURE — 63600175 PHARM REV CODE 636 W HCPCS: Performed by: STUDENT IN AN ORGANIZED HEALTH CARE EDUCATION/TRAINING PROGRAM

## 2020-12-01 PROCEDURE — 80053 COMPREHEN METABOLIC PANEL: CPT

## 2020-12-01 PROCEDURE — 83880 ASSAY OF NATRIURETIC PEPTIDE: CPT

## 2020-12-01 PROCEDURE — 25000003 PHARM REV CODE 250: Performed by: EMERGENCY MEDICINE

## 2020-12-01 PROCEDURE — 93010 ELECTROCARDIOGRAM REPORT: CPT | Mod: ,,, | Performed by: INTERNAL MEDICINE

## 2020-12-01 PROCEDURE — 12000002 HC ACUTE/MED SURGE SEMI-PRIVATE ROOM

## 2020-12-01 PROCEDURE — 94761 N-INVAS EAR/PLS OXIMETRY MLT: CPT

## 2020-12-01 PROCEDURE — 25000003 PHARM REV CODE 250: Performed by: STUDENT IN AN ORGANIZED HEALTH CARE EDUCATION/TRAINING PROGRAM

## 2020-12-01 PROCEDURE — 36430 TRANSFUSION BLD/BLD COMPNT: CPT

## 2020-12-01 PROCEDURE — 25000003 PHARM REV CODE 250: Performed by: NURSE PRACTITIONER

## 2020-12-01 PROCEDURE — 85610 PROTHROMBIN TIME: CPT

## 2020-12-01 PROCEDURE — 99285 EMERGENCY DEPT VISIT HI MDM: CPT | Mod: 25

## 2020-12-01 PROCEDURE — U0002 COVID-19 LAB TEST NON-CDC: HCPCS

## 2020-12-01 PROCEDURE — P9016 RBC LEUKOCYTES REDUCED: HCPCS

## 2020-12-01 PROCEDURE — 99900031 HC PATIENT EDUCATION (STAT)

## 2020-12-01 PROCEDURE — 94640 AIRWAY INHALATION TREATMENT: CPT

## 2020-12-01 PROCEDURE — 86920 COMPATIBILITY TEST SPIN: CPT

## 2020-12-01 PROCEDURE — S4991 NICOTINE PATCH NONLEGEND: HCPCS | Performed by: STUDENT IN AN ORGANIZED HEALTH CARE EDUCATION/TRAINING PROGRAM

## 2020-12-01 PROCEDURE — 99900035 HC TECH TIME PER 15 MIN (STAT)

## 2020-12-01 PROCEDURE — 90935 HEMODIALYSIS ONE EVALUATION: CPT

## 2020-12-01 PROCEDURE — 86901 BLOOD TYPING SEROLOGIC RH(D): CPT

## 2020-12-01 PROCEDURE — 93010 EKG 12-LEAD: ICD-10-PCS | Mod: ,,, | Performed by: INTERNAL MEDICINE

## 2020-12-01 PROCEDURE — 25000242 PHARM REV CODE 250 ALT 637 W/ HCPCS: Performed by: EMERGENCY MEDICINE

## 2020-12-01 PROCEDURE — 83605 ASSAY OF LACTIC ACID: CPT

## 2020-12-01 PROCEDURE — 84484 ASSAY OF TROPONIN QUANT: CPT

## 2020-12-01 PROCEDURE — 85025 COMPLETE CBC W/AUTO DIFF WBC: CPT

## 2020-12-01 PROCEDURE — 93005 ELECTROCARDIOGRAM TRACING: CPT | Performed by: INTERNAL MEDICINE

## 2020-12-01 PROCEDURE — 25000003 PHARM REV CODE 250: Performed by: INTERNAL MEDICINE

## 2020-12-01 PROCEDURE — 27000221 HC OXYGEN, UP TO 24 HOURS

## 2020-12-01 PROCEDURE — 87040 BLOOD CULTURE FOR BACTERIA: CPT | Mod: 59

## 2020-12-01 RX ORDER — MUPIROCIN 20 MG/G
OINTMENT TOPICAL 2 TIMES DAILY
Status: DISCONTINUED | OUTPATIENT
Start: 2020-12-01 | End: 2020-12-03 | Stop reason: HOSPADM

## 2020-12-01 RX ORDER — TRAMADOL HYDROCHLORIDE 50 MG/1
50 TABLET ORAL
Status: DISCONTINUED | OUTPATIENT
Start: 2020-12-01 | End: 2020-12-03 | Stop reason: HOSPADM

## 2020-12-01 RX ORDER — HYDROCODONE BITARTRATE AND ACETAMINOPHEN 500; 5 MG/1; MG/1
TABLET ORAL
Status: DISCONTINUED | OUTPATIENT
Start: 2020-12-01 | End: 2020-12-03 | Stop reason: HOSPADM

## 2020-12-01 RX ORDER — METOPROLOL SUCCINATE 25 MG/1
25 TABLET, EXTENDED RELEASE ORAL NIGHTLY
Status: DISCONTINUED | OUTPATIENT
Start: 2020-12-01 | End: 2020-12-03 | Stop reason: HOSPADM

## 2020-12-01 RX ORDER — LOSARTAN POTASSIUM 25 MG/1
25 TABLET ORAL DAILY
Status: DISCONTINUED | OUTPATIENT
Start: 2020-12-01 | End: 2020-12-03 | Stop reason: HOSPADM

## 2020-12-01 RX ORDER — SODIUM CHLORIDE 9 MG/ML
INJECTION, SOLUTION INTRAVENOUS ONCE
Status: COMPLETED | OUTPATIENT
Start: 2020-12-01 | End: 2020-12-01

## 2020-12-01 RX ORDER — IBUPROFEN 200 MG
1 TABLET ORAL DAILY
Status: DISCONTINUED | OUTPATIENT
Start: 2020-12-01 | End: 2020-12-03 | Stop reason: HOSPADM

## 2020-12-01 RX ORDER — CHLORHEXIDINE GLUCONATE ORAL RINSE 1.2 MG/ML
15 SOLUTION DENTAL 2 TIMES DAILY
Status: DISCONTINUED | OUTPATIENT
Start: 2020-12-01 | End: 2020-12-03 | Stop reason: HOSPADM

## 2020-12-01 RX ORDER — IPRATROPIUM BROMIDE AND ALBUTEROL SULFATE 2.5; .5 MG/3ML; MG/3ML
3 SOLUTION RESPIRATORY (INHALATION)
Status: COMPLETED | OUTPATIENT
Start: 2020-12-01 | End: 2020-12-01

## 2020-12-01 RX ORDER — SODIUM CHLORIDE 0.9 % (FLUSH) 0.9 %
10 SYRINGE (ML) INJECTION
Status: DISCONTINUED | OUTPATIENT
Start: 2020-12-01 | End: 2020-12-03 | Stop reason: HOSPADM

## 2020-12-01 RX ORDER — PANTOPRAZOLE SODIUM 40 MG/1
40 TABLET, DELAYED RELEASE ORAL DAILY
Status: DISCONTINUED | OUTPATIENT
Start: 2020-12-01 | End: 2020-12-01

## 2020-12-01 RX ORDER — SODIUM THIOSULFATE 250 MG/ML
25 INJECTION, SOLUTION INTRAVENOUS
Status: DISCONTINUED | OUTPATIENT
Start: 2020-12-02 | End: 2020-12-03 | Stop reason: HOSPADM

## 2020-12-01 RX ORDER — PANTOPRAZOLE SODIUM 40 MG/10ML
40 INJECTION, POWDER, LYOPHILIZED, FOR SOLUTION INTRAVENOUS DAILY
Status: DISCONTINUED | OUTPATIENT
Start: 2020-12-01 | End: 2020-12-01

## 2020-12-01 RX ORDER — LORAZEPAM 2 MG/ML
1 INJECTION INTRAMUSCULAR ONCE
Status: COMPLETED | OUTPATIENT
Start: 2020-12-01 | End: 2020-12-01

## 2020-12-01 RX ORDER — LANOLIN ALCOHOL/MO/W.PET/CERES
400 CREAM (GRAM) TOPICAL DAILY
Status: DISCONTINUED | OUTPATIENT
Start: 2020-12-01 | End: 2020-12-03 | Stop reason: HOSPADM

## 2020-12-01 RX ORDER — TALC
6 POWDER (GRAM) TOPICAL NIGHTLY PRN
Status: DISCONTINUED | OUTPATIENT
Start: 2020-12-01 | End: 2020-12-03 | Stop reason: HOSPADM

## 2020-12-01 RX ORDER — SODIUM CHLORIDE 9 MG/ML
INJECTION, SOLUTION INTRAVENOUS
Status: DISCONTINUED | OUTPATIENT
Start: 2020-12-01 | End: 2020-12-03 | Stop reason: HOSPADM

## 2020-12-01 RX ORDER — HYDROMORPHONE HYDROCHLORIDE 1 MG/ML
1 INJECTION, SOLUTION INTRAMUSCULAR; INTRAVENOUS; SUBCUTANEOUS EVERY 6 HOURS PRN
Status: DISCONTINUED | OUTPATIENT
Start: 2020-12-01 | End: 2020-12-03 | Stop reason: HOSPADM

## 2020-12-01 RX ORDER — CALCIUM ACETATE 667 MG/1
667 CAPSULE ORAL DAILY
Status: DISCONTINUED | OUTPATIENT
Start: 2020-12-01 | End: 2020-12-03 | Stop reason: HOSPADM

## 2020-12-01 RX ORDER — OXYCODONE AND ACETAMINOPHEN 5; 325 MG/1; MG/1
1 TABLET ORAL EVERY 6 HOURS PRN
Status: DISCONTINUED | OUTPATIENT
Start: 2020-12-01 | End: 2020-12-03 | Stop reason: HOSPADM

## 2020-12-01 RX ORDER — ONDANSETRON 4 MG/1
4 TABLET, ORALLY DISINTEGRATING ORAL EVERY 6 HOURS PRN
Status: DISCONTINUED | OUTPATIENT
Start: 2020-12-01 | End: 2020-12-03 | Stop reason: HOSPADM

## 2020-12-01 RX ORDER — PANTOPRAZOLE SODIUM 40 MG/1
40 TABLET, DELAYED RELEASE ORAL DAILY
Status: DISCONTINUED | OUTPATIENT
Start: 2020-12-01 | End: 2020-12-03 | Stop reason: HOSPADM

## 2020-12-01 RX ORDER — LEVALBUTEROL INHALATION SOLUTION 0.63 MG/3ML
1 SOLUTION RESPIRATORY (INHALATION) EVERY 8 HOURS PRN
Status: DISCONTINUED | OUTPATIENT
Start: 2020-12-01 | End: 2020-12-03 | Stop reason: HOSPADM

## 2020-12-01 RX ORDER — METOPROLOL TARTRATE 1 MG/ML
5 INJECTION, SOLUTION INTRAVENOUS EVERY 5 MIN PRN
Status: DISCONTINUED | OUTPATIENT
Start: 2020-12-01 | End: 2020-12-03 | Stop reason: HOSPADM

## 2020-12-01 RX ORDER — DILTIAZEM HYDROCHLORIDE 120 MG/1
240 CAPSULE, COATED, EXTENDED RELEASE ORAL DAILY
Status: DISCONTINUED | OUTPATIENT
Start: 2020-12-01 | End: 2020-12-03 | Stop reason: HOSPADM

## 2020-12-01 RX ORDER — ISOSORBIDE MONONITRATE 30 MG/1
30 TABLET, EXTENDED RELEASE ORAL DAILY
Status: DISCONTINUED | OUTPATIENT
Start: 2020-12-01 | End: 2020-12-03 | Stop reason: HOSPADM

## 2020-12-01 RX ORDER — PANTOPRAZOLE SODIUM 40 MG/1
40 TABLET, DELAYED RELEASE ORAL 2 TIMES DAILY
Status: DISCONTINUED | OUTPATIENT
Start: 2020-12-01 | End: 2020-12-01

## 2020-12-01 RX ORDER — LORAZEPAM 2 MG/ML
1 INJECTION INTRAMUSCULAR ONCE
Status: DISCONTINUED | OUTPATIENT
Start: 2020-12-01 | End: 2020-12-01

## 2020-12-01 RX ADMIN — DILTIAZEM HYDROCHLORIDE 240 MG: 120 CAPSULE, COATED, EXTENDED RELEASE ORAL at 01:12

## 2020-12-01 RX ADMIN — OXYCODONE AND ACETAMINOPHEN 1 TABLET: 5; 325 TABLET ORAL at 03:12

## 2020-12-01 RX ADMIN — ISOSORBIDE MONONITRATE 30 MG: 30 TABLET, EXTENDED RELEASE ORAL at 01:12

## 2020-12-01 RX ADMIN — SODIUM THIOSULFATE 25 G: 250 INJECTION, SOLUTION INTRAVENOUS at 12:12

## 2020-12-01 RX ADMIN — NICOTINE 1 PATCH: 14 PATCH, EXTENDED RELEASE TRANSDERMAL at 03:12

## 2020-12-01 RX ADMIN — IPRATROPIUM BROMIDE AND ALBUTEROL SULFATE 3 ML: .5; 3 SOLUTION RESPIRATORY (INHALATION) at 04:12

## 2020-12-01 RX ADMIN — TRAMADOL HYDROCHLORIDE 50 MG: 50 TABLET, FILM COATED ORAL at 11:12

## 2020-12-01 RX ADMIN — CHLORHEXIDINE GLUCONATE 15 ML: 1.2 RINSE ORAL at 09:12

## 2020-12-01 RX ADMIN — SODIUM CHLORIDE 100 ML/HR: 0.9 INJECTION, SOLUTION INTRAVENOUS at 12:12

## 2020-12-01 RX ADMIN — MUPIROCIN: 20 OINTMENT TOPICAL at 09:12

## 2020-12-01 RX ADMIN — METOPROLOL SUCCINATE 25 MG: 25 TABLET, FILM COATED, EXTENDED RELEASE ORAL at 09:12

## 2020-12-01 RX ADMIN — NITROGLYCERIN 1 INCH: 20 OINTMENT TOPICAL at 04:12

## 2020-12-01 RX ADMIN — PANTOPRAZOLE SODIUM 40 MG: 40 TABLET, DELAYED RELEASE ORAL at 03:12

## 2020-12-01 RX ADMIN — LORAZEPAM 1 MG: 2 INJECTION INTRAMUSCULAR; INTRAVENOUS at 09:12

## 2020-12-01 RX ADMIN — MAGNESIUM OXIDE 400 MG: 400 TABLET ORAL at 01:12

## 2020-12-01 RX ADMIN — LOSARTAN POTASSIUM 25 MG: 25 TABLET, FILM COATED ORAL at 01:12

## 2020-12-01 RX ADMIN — CALCIUM ACETATE 667 MG: 667 CAPSULE ORAL at 01:12

## 2020-12-01 NOTE — PROGRESS NOTES
Hospital medicine rounding note    76 yo F with PMH of ESRD (MWF/anuric), Chronic combined systolic and diastolic heart failure and prior GI admitted earlier this AM for acute blood loss anemia secondary to GI bleed and acute on chronic combined CHF exacerbation.     H&P fby NP Martin from earlier today reviewed  Pertinent imaging and labs reviewed  Labs: h/h 623/12.7, BNP greater than 4500, troponin wnl  Imaging: cxr: Stable moderate cardiomegaly, with scattered nonspecific interstitial opacities, lower lung subsegmental atelectasis, and small bilateral pleural effusions.    patient seen and examined while boarding in the Ed.  Afvvs: She reported sob but sating 100% on 4LNC.  Denied any CP, cough, N/V/D, headaches, fever or chills.       [facility] with plan as detailed in h[facility]P  [facility]  - 2 units prbc ordered  - case d/w GI: plans for EGD in am, ok for clears then npo [place]  - will step to med/surg with remote tele    Will continue to follow.   Maris Chamorro D.O.  12/01/2020

## 2020-12-01 NOTE — ED PROVIDER NOTES
Encounter Date: 12/1/2020       History     Chief Complaint   Patient presents with    Shortness of Breath     75-year-old female with past medical history significant of AFib, CHF, end-stage renal disease on dialysis presents with shortness of breath.  Patient states she had a severe case of shortness of breath and decided to come to emergency room for further assessment.  Patient states she completed her dialysis session on today without any complications but developed shortness of breath on tonight.  Patient was also told that she was losing blood from unknown source and has appointment scheduled with GI for endoscopy.  She denies any fevers, chills, sweats, nausea vomiting.  She is otherwise stable and has no other complaints.        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    both legs    CHF (congestive heart failure)     Encounter for blood transfusion 03/2016    Gout     Hemodialysis access, AV graft     Hypertension     Mitral valve regurgitation     Osteoarthritis     Pancreatitis     Peripheral vascular disease     Pneumonia 09/09/2017    Renal failure      Past Surgical History:   Procedure Laterality Date    ACHILLES TENDON SURGERY Right     ANGIOGRAPHY OF ARTERIOVENOUS SHUNT Right 1/7/2020    Procedure: Fistulogram with Possible Intervention;  Surgeon: Joseph Loyola MD;  Location: [facility] CATH/EP LAB;  Service: Cardiology;  Laterality: Right;    ANGIOGRAPHY OF LOWER EXTREMITY Left 3/18/2020    Procedure: Angiogram Extremity Unilateral;  Surgeon: Ali Khoobehi, MD;  Location: [facility] CATH/EP LAB;  Service: Cardiology;  Laterality: Left;    APPENDECTOMY      CARDIAC CATHETERIZATION  07/03/2017    CHOLECYSTECTOMY      COLONOSCOPY Left 10/4/2020    Procedure: COLONOSCOPY;  Surgeon: Jordan Kilpatrick MD;  Location: [facility] ENDO;  Service: Endoscopy;  Laterality: Left;     ESOPHAGOGASTRODUODENOSCOPY Left 8/17/2020    Procedure: EGD (ESOPHAGOGASTRODUODENOSCOPY);  Surgeon: Talat Trevizo MD;  Location: [place] ENDO;  Service: Endoscopy;  Laterality: Left;    ESOPHAGOGASTRODUODENOSCOPY Left 10/2/2020    Procedure: EGD (ESOPHAGOGASTRODUODENOSCOPY);  Surgeon: Talat Trevizo MD;  Location: [facility] ENDO;  Service: Endoscopy;  Laterality: Left;    heal surgery Right     HYSTERECTOMY      INSERTION OF STENT INTO PERIPHERAL VESSEL N/A 1/7/2020    Procedure: INSERTION, STENT, VESSEL, PERIPHERAL;  Surgeon: Joseph Loyola MD;  Location: [facility] CATH/EP LAB;  Service: Cardiology;  Laterality: N/A;    MITRAL VALVE REPLACEMENT      PARATHYROIDECTOMY      PARATHYROIDECTOMY  07/13/2017    PERCUTANEOUS TRANSLUMINAL ANGIOPLASTY OF ARTERIOVENOUS FISTULA N/A 1/7/2020    Procedure: PTA, AV FISTULA;  Surgeon: Joseph Loyola MD;  Location: [facility] CATH/EP LAB;  Service: Cardiology;  Laterality: N/A;    PERITONEAL CATHETER INSERTION      RENAL BIOPSY      vocal cord nodule      WOUND DEBRIDEMENT Left 7/13/2020    Procedure: DEBRIDEMENT, WOUND;  Surgeon: Carlos Elam III, MD;  Location: [facility] OR;  Service: General;  Laterality: Left;     Family History   Problem Relation Age of Onset    Heart disease Sister     Early death Sister         heart as baby    Heart disease Maternal Grandfather     Diabetes Mother     Hypertension Mother     Heart failure Mother     Heart disease Mother     Arthritis Mother     Diabetes Father     Early death Sister         infant     Social History     Tobacco Use    Smoking status: Current Some Day Smoker     Packs/day: 0.50     Years: 45.00     Pack years: 22.50     Types: Cigarettes    Smokeless tobacco: Never Used   Substance Use Topics    Alcohol use: No    Drug use: No     Review of Systems   Constitutional: Negative for fever.   HENT: Negative for sore throat.    Respiratory: Positive for cough, shortness of breath and wheezing.    Cardiovascular:  Negative for chest pain.   Gastrointestinal: Negative for nausea.   Genitourinary: Negative for dysuria.   Musculoskeletal: Negative for back pain.   Skin: Negative for rash.   Neurological: Negative for weakness.   Hematological: Does not bruise/bleed easily.   All other systems reviewed and are negative.      Physical Exam     Initial Vitals [12/01/20 0417]   BP Pulse Resp Temp SpO2   (!) 228/99 94 (!) 36 98.4 °F (36.9 °C) 100 %      MAP       --         Physical Exam    Nursing note and vitals reviewed.  Constitutional: She appears well-developed and well-nourished. She appears distressed.   HENT:   Head: Normocephalic and atraumatic.   Mouth/Throat: Oropharynx is clear and moist.   Eyes: Conjunctivae and EOM are normal. Pupils are equal, round, and reactive to light.   Neck: Normal range of motion. No tracheal deviation present. No JVD present.   Cardiovascular: Normal rate, regular rhythm, normal heart sounds and intact distal pulses. Exam reveals no gallop and no friction rub.    No murmur heard.  Pulmonary/Chest: She is in respiratory distress. She has wheezes. She has rhonchi. She exhibits no tenderness.   Abdominal: Soft. Bowel sounds are normal. She exhibits no distension. There is no abdominal tenderness. There is no rebound and no guarding.   Musculoskeletal: Normal range of motion. No tenderness or edema.   Neurological: She is alert and oriented to person, place, and time. She has normal strength. No cranial nerve deficit or sensory deficit.   Skin: Skin is warm and dry. Capillary refill takes less than 2 seconds. No erythema.   Psychiatric: She has a normal mood and affect.         ED Course   Procedures  Labs Reviewed   CBC W/ AUTO DIFFERENTIAL - Abnormal; Notable for the following components:       Result Value    RBC 2.33 (*)     Hemoglobin 6.3 (*)     Hematocrit 21.7 (*)     MCHC 29.0 (*)     RDW 19.9 (*)     Immature Granulocytes 0.8 (*)     Immature Grans (Abs) 0.06 (*)     Lymph % 16.6 (*)      All other components within normal limits    Narrative:        HGB ritical result(s) called and verbal readback obtained from   Ysabel Jasmine RN ED by MP4 12/01/2020 05:14   COMPREHENSIVE METABOLIC PANEL - Abnormal; Notable for the following components:    BUN 79 (*)     Creatinine 6.1 (*)     Calcium 8.0 (*)     Albumin 2.9 (*)     ALT 9 (*)     eGFR if  7.2 (*)     eGFR if non  6.2 (*)     All other components within normal limits   B-TYPE NATRIURETIC PEPTIDE - Abnormal; Notable for the following components:    BNP >4,500 (*)     All other components within normal limits   PROTIME-INR - Abnormal; Notable for the following components:    PT 16.3 (*)     All other components within normal limits   CULTURE, BLOOD   CULTURE, BLOOD   TROPONIN I   LACTIC ACID, PLASMA   SARS-COV-2 RNA AMPLIFICATION, QUAL   URINALYSIS, REFLEX TO URINE CULTURE   TYPE & SCREEN        ECG Results          EKG 12-lead (In process)  Result time 12/01/20 05:14:06    In process by Interface, Lab In [facility] (12/01/20 05:14:06)                 Narrative:    Test Reason : R06.02,    Vent. Rate : 090 BPM     Atrial Rate : 090 BPM     P-R Int : 186 ms          QRS Dur : 088 ms      QT Int : 380 ms       P-R-T Axes : 061 -29 074 degrees     QTc Int : 464 ms    Normal sinus rhythm  Nonspecific T wave abnormality  Abnormal ECG  When compared with ECG of 15-NOV-2020 18:56,  Sinus rhythm has replaced Atrial flutter    Referred By: [email or web address]   SELF           Confirmed By:                             Imaging Results          X-Ray Chest AP Portable (In process)                  Medical Decision Making:   Initial Assessment:   [age] year female initial assessment respiratory distress.  Patient is alert oriented x3, neurologically neurovascular intact no focal deficits.  She is tachypneic and hypertensive otherwise nontoxic appearing at this time.  Differential Diagnosis:   CHF versus COPD exacerbation, fluid overload  status, electrolyte abnormality, dehydration, anemia, pneumonia, PE, COVID-19  Independently Interpreted Test(s):   I have ordered and independently interpreted X-rays - see prior notes.  I have ordered and independently interpreted EKG Reading(s) - see prior notes  Clinical Tests:   Lab Tests: Ordered and Reviewed  The following lab test(s) were unremarkable: CBC, CMP, Urinalysis, Troponin, BNP and Lactate  Radiological Study: Ordered and Reviewed  Medical Tests: Ordered and Reviewed  ED Management:  Patient has been reassessed noted to have no acute changes condition.  She has symptomatic anemic with H&H less than 7 therefore likely requiring blood transfusion as well as another round of hemodialysis.  Patient has otherwise remained stable while in the ED and Ms. Neely is aware of the plan and in agreement with admission.    Laboratory results and radiology imaging results reviewed.  Based on the patient's history, physical, and workup here in the emergency department I believe the patient requires admission for a diagnosis of symptomatic anemia.  I discussed the patient's case with the on-call NP who has agreed to evaluate the patient for admission.  In addition, I discussed the results with the patient and they have verbalized understanding of the results, plan, and need for admission.    ________________________  EVE Shannon MD   Emergency Medicine                               Clinical Impression:       ICD-10-CM ICD-9-CM   1. Anemia, unspecified type  D64.9 285.9   2. Shortness of breath  R06.02 786.05                          ED Disposition Condition    Observation                             Rambo Shannon MD  12/01/20 0600

## 2020-12-01 NOTE — CARE UPDATE
12/01/20 0437   Patient Assessment/Suction   Level of Consciousness (AVPU) alert   Respiratory Effort Mild   Expansion/Accessory Muscles/Retractions accessory muscle use   All Lung Fields Breath Sounds coarse   Rhythm/Pattern, Respiratory shortness of breath   Cough Type congested;fair;nonproductive   PRE-TX-O2   SpO2 97 %   Pulse 86   Resp (!) 30   Aerosol Therapy   $ Aerosol Therapy Charges Aerosol Treatment   Daily Review of Necessity (SVN) completed   Respiratory Treatment Status (SVN) given;mouth rinsed post treatment   Treatment Route (SVN) air;mask   Patient Position (SVN) Clark's   Post Treatment Assessment (SVN) increased aeration;patient reports breathing improved   Signs of Intolerance (SVN) none   Breath Sounds Post-Respiratory Treatment   Throughout All Fields Post-Treatment All Fields   Throughout All Fields Post-Treatment aeration increased   Post-treatment Heart Rate (beats/min) 90   Post-treatment Resp Rate (breaths/min) 24   Respiratory Interventions   Cough And Deep Breathing done with encouragement   Breathing Techniques/Airway Clearance deep/controlled cough encouraged;diaphragmatic breathing promoted   Education   $ Education Bronchodilator;Other (see comment);15 min  (deep diaphragmatic breathing exercises)   Respiratory Evaluation   $ Care Plan Tech Time 15 min   Evaluation For New Orders

## 2020-12-01 NOTE — H&P
[facility] History & Physical Examination   Patient Name: Griselda Neely  MRN: [id number]  Patient Class: Emergency   Admission Date: 12/1/2020  4:14 AM  Length of Stay: 0  Attending Physician:   Primary Care Provider: Kalie Neely MD  Face-to-Face encounter date: 12/01/2020  Code Status:DNR  MPOA:  Chief Complaint: Shortness of Breath        Patient information was obtained from patient, past medical records and ER records.   HISTORY OF PRESENT ILLNESS:   Griselda Neely is a 75 y.o. 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 The patient presented to [facility] on 12/1/2020 with a primary complaint of Shortness of Breath  .     75-year-old female well known to hospitalist service presents emergency room with shortness of breath.  This patient has a noted history of atrial fibrillation, end-stage renal disease requiring hemodialysis 3 times a week CHF hypertension calciphylaxis of both legs anemia of chronic disease and long-term anticoagulation use for atrial thrombus.  The patient states her last hemodialysis was yesterday but she has persistent shortness of breath and decided to come to the emergency room for further evaluation.  The patient states she dialyzed for her full time yesterday without complications.  The patient has a known history of a GI bleed and was told that she is scheduled GI appointment for endoscopy.  She denies fever, chills, cough, hematemesis hemoptysis nausea vomiting lightheadedness dizziness she describes her symptoms as moderate in severity with no exacerbation alleviation.  She does complain of profound weakness  REVIEW  OF SYSTEMS:   10 Point Review of System was performed and was found to be negative except for that mentioned already in the HPI and   Review of Systems (Negative unless checked off)  Review of Systems   Constitutional: Positive for malaise/fatigue.   HENT: Negative.    Eyes: Negative.    Respiratory: Positive for shortness of breath.    Cardiovascular: Negative.    Gastrointestinal: Positive for nausea.   Genitourinary: Positive for urgency.   Musculoskeletal: Negative.    Skin: Negative.    Neurological: Positive for weakness.   Endo/Heme/Allergies: Negative.    Psychiatric/Behavioral: The patient is nervous/anxious.            PAST MEDICAL HISTORY:     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    both legs    CHF (congestive heart failure)     Encounter for blood transfusion 03/2016    Gout     Hemodialysis access, AV graft     Hypertension     Mitral valve regurgitation     Osteoarthritis     Pancreatitis     Peripheral vascular disease     Pneumonia 09/09/2017    Renal failure        PAST SURGICAL HISTORY:     Past Surgical History:   Procedure Laterality Date    ACHILLES TENDON SURGERY Right     ANGIOGRAPHY OF ARTERIOVENOUS SHUNT Right 1/7/2020    Procedure: Fistulogram with Possible Intervention;  Surgeon: Joseph Loyola MD;  Location: [facility] [place] CATH/EP LAB;  Service: Cardiology;  Laterality: Right;    ANGIOGRAPHY OF LOWER EXTREMITY Left 3/18/2020    Procedure: Angiogram Extremity Unilateral;  Surgeon: Ali Khoobehi, MD;  Location: [facility] [place] CATH/EP LAB;  Service: Cardiology;  Laterality: Left;    APPENDECTOMY      CARDIAC CATHETERIZATION  07/03/2017    CHOLECYSTECTOMY      COLONOSCOPY Left 10/4/2020    Procedure: COLONOSCOPY;  Surgeon: Jordan Kilpatrick MD;  Location: [facility] [place] ENDO;  Service: Endoscopy;  Laterality: Left;    ESOPHAGOGASTRODUODENOSCOPY Left 8/17/2020    Procedure: EGD (ESOPHAGOGASTRODUODENOSCOPY);  Surgeon: Talat Trevizo,  MD;  Location: [facility] ENDO;  Service: Endoscopy;  Laterality: Left;    ESOPHAGOGASTRODUODENOSCOPY Left 10/2/2020    Procedure: EGD (ESOPHAGOGASTRODUODENOSCOPY);  Surgeon: Talat Trevizo MD;  Location: [facility] ENDO;  Service: Endoscopy;  Laterality: Left;    heal surgery Right     HYSTERECTOMY      INSERTION OF STENT INTO PERIPHERAL VESSEL N/A 1/7/2020    Procedure: INSERTION, STENT, VESSEL, PERIPHERAL;  Surgeon: Joseph Loyola MD;  Location: [facility] CATH/EP LAB;  Service: Cardiology;  Laterality: N/A;    MITRAL VALVE REPLACEMENT      PARATHYROIDECTOMY      PARATHYROIDECTOMY  07/13/2017    PERCUTANEOUS TRANSLUMINAL ANGIOPLASTY OF ARTERIOVENOUS FISTULA N/A 1/7/2020    Procedure: PTA, AV FISTULA;  Surgeon: Joseph Loyola MD;  Location: [facility] CATH/EP LAB;  Service: Cardiology;  Laterality: N/A;    PERITONEAL CATHETER INSERTION      RENAL BIOPSY      vocal cord nodule      WOUND DEBRIDEMENT Left 7/13/2020    Procedure: DEBRIDEMENT, WOUND;  Surgeon: Carlos Elam III, MD;  Location: [facility] OR;  Service: General;  Laterality: Left;       ALLERGIES:   Patient has no known allergies.    FAMILY HISTORY:     Family History   Problem Relation Age of Onset    Heart disease Sister     Early death Sister         heart as baby    Heart disease Maternal Grandfather     Diabetes Mother     Hypertension Mother     Heart failure Mother     Heart disease Mother     Arthritis Mother     Diabetes Father     Early death Sister         infant       SOCIAL HISTORY:     Social History     Tobacco Use    Smoking status: Current Some Day Smoker     Packs/day: 0.50     Years: 45.00     Pack years: 22.50     Types: Cigarettes    Smokeless tobacco: Never Used   Substance Use Topics    Alcohol use: No        Social History     Substance and Sexual Activity   Sexual Activity Not Currently        HOME MEDICATIONS:     Prior to Admission medications    Medication Sig Start Date End Date Taking? Authorizing Provider   aspirin  (ECOTRIN) 81 MG EC tablet Take 1 tablet (81 mg total) by mouth once daily. 10/7/20 12/1/20 Yes Hola Rubio MD   calcium acetate (PHOSLO) 667 mg capsule Take 667 mg by mouth once daily.    Yes Historical Provider   folic acid/vit B complex and C (JENNIFER-KODAK ORAL) Take 1 tablet by mouth once daily.   Yes Historical Provider   isosorbide mononitrate (IMDUR) 30 MG 24 hr tablet Take 30 mg by mouth once daily.   Yes Historical Provider   losartan (COZAAR) 25 MG tablet Take 25 mg by mouth once daily.   Yes Historical Provider   magnesium oxide (MAG-OX) 400 mg (241.3 mg magnesium) tablet Take 1 tablet by mouth once daily  Patient taking differently: Take 400 mg by mouth once daily.  7/23/20  Yes Juan Esparza MD   metoprolol succinate (TOPROL-XL) 25 MG 24 hr tablet Take 1 tablet (25 mg total) by mouth nightly. 9/18/20 12/1/20 Yes Katharina Powell MD   ondansetron (ZOFRAN-ODT) 4 MG TbDL Take 4 mg by mouth every 6 (six) hours as needed.   Yes Historical Provider   pantoprazole (PROTONIX) 40 MG tablet Take 1 tablet (40 mg total) by mouth once daily. 9/18/20 12/1/20 Yes Katharina Powell MD   warfarin (COUMADIN) 2 MG tablet warfarin 2 mg tablet   Yes Historical Provider   zinc sulfate 220 mg Tab tablet Take 220 mg by mouth every other day.   Yes Historical Provider   diltiaZEM (DILACOR XR) 240 MG CDCR Take 1 capsule (240 mg total) by mouth once daily. 11/10/20 11/10/21  Cierra Thomas MD   levalbuterol (XOPENEX) 0.63 mg/3 mL nebulizer solution Take 3 mLs (0.63 mg total) by nebulization every 8 (eight) hours as needed for Wheezing or Shortness of Breath. Rescue 11/30/20 11/30/21  Kalie Neely MD   oxyCODONE-acetaminophen (PERCOCET) 5-325 mg per tablet Take 1 tablet by mouth every 6 (six) hours as needed for Pain.    Historical Provider   traMADoL (ULTRAM) 50 mg tablet Take 50 mg by mouth 3 (three) times a week.    Historical Provider   wheelchair An 1 Device by Misc.(Non-Drug; Combo Route)  "route as needed (needed for transport). 9/8/20   Carlos Elam III, MD         PHYSICAL EXAM:   BP (!) 171/74   Pulse 87   Temp 98.4 °F (36.9 °C) (Oral)   Resp (!) 24   Ht 5' 5" (1.651 m)   Wt 61.2 kg (134 lb 14.7 oz)   LMP  (LMP Unknown)   SpO2 100%   Breastfeeding No   BMI 22.45 kg/m²   Vitals Reviewed  General appearance: Well-developed, well-nourished female in no apparent distress.  Skin: No Rash.   Neuro: Motor and sensory exams grossly intact. Good tone. Power in all 4 extremities 5/5.   HENT: Atraumatic head. Moist mucous membranes of oral cavity.  Eyes: Normal extraocular movements.   Neck: Supple. No evidence of lymphadenopathy. No thyroidomegaly.  Lungs:  Moderate rhonchi to bases moderate tachypnea inspiratory expiratory wheezing   Heart: Regular rate and rhythm. S1 and S2 present with + murmurs/gallop/rub. No pedal edema. No JVD present.   Abdomen: Soft, non-distended, non-tender. No rebound tenderness/guarding. No masses or organomegaly. Bowel sounds are normal. Bladder is not palpable.   Extremities: + edema.  Positive chronic discoloration to bilateral lower extremities history of calciphylaxis  Psych/mental status: mild confusion Cooperative. Responds appropriately to questions.   EMERGENCY DEPARTMENT LABS AND IMAGING:   Following labs were Reviewed   Recent Labs   Lab 12/01/20 0420   WBC 7.51   HGB 6.3*   HCT 21.7*      CALCIUM 8.0*   ALBUMIN 2.9*   PROT 7.9      K 5.1   CO2 26   CL 97   BUN 79*   CREATININE 6.1*   ALKPHOS 102   ALT 9*   AST 14   BILITOT 0.7         BMP:   Recent Labs   Lab 12/01/20  0420   GLU 83      K 5.1   CL 97   CO2 26   BUN 79*   CREATININE 6.1*   CALCIUM 8.0*   , CMP   Recent Labs   Lab 12/01/20 0420      K 5.1   CL 97   CO2 26   GLU 83   BUN 79*   CREATININE 6.1*   CALCIUM 8.0*   PROT 7.9   ALBUMIN 2.9*   BILITOT 0.7   ALKPHOS 102   AST 14   ALT 9*   ANIONGAP 16   ESTGFRAFRICA 7.2*   EGFRNONAA 6.2*   , CBC   Recent Labs   Lab " 12/01/20  0420   WBC 7.51   HGB 6.3*   HCT 21.7*      , INR   Lab Results   Component Value Date    INR 1.4 12/01/2020    INR 1.3 11/18/2020    INR 1.4 11/17/2020   , Lipid Panel   Lab Results   Component Value Date    CHOL 155 10/19/2019    HDL 56 10/19/2019    LDLCALC 82.6 10/19/2019    TRIG 82 10/19/2019    CHOLHDL 36.1 10/19/2019   , Troponin   Recent Labs   Lab 12/01/20  0420   TROPONINI 0.031   , A1C:   Recent Labs   Lab 11/07/20  1242   HGBA1C SEE COMMENT    and All labs within the past 24 hours have been reviewed  Microbiology Results (last 7 days)     Procedure Component Value Units Date/Time    Blood Culture #2 **CANNOT BE ORDERED STAT** [[id number]] Collected: 12/01/20 0507    Order Status: Sent Specimen: Blood from Peripheral, Forearm, Left Updated: 12/01/20 0511    Blood Culture #1 **CANNOT BE ORDERED STAT** [[id number]] Collected: 12/01/20 0420    Order Status: Sent Specimen: Blood from Peripheral, Antecubital, Left Updated: 12/01/20 0505        X-Ray Chest AP Portable    (Results Pending)     X-ray Foot Complete Left    Result Date: 11/9/2020  3 views of the left foot Clinical history is pain to the first digit There is osteopenia. There is moderate peripheral vascular calcification. There are no fractures, dislocations or acute osseous abnormalities. There are erosions of the tip of the distal phalanx of the first digit. IMPRESSION: Erosions of the tip of the distal phalanx of the first digit No acute osseous abnormality Diffuse peripheral vascular calcification Electronically Signed by Keily Hernandez M.D. on 11/9/2020 3:30 PM    Ct Head Without Contrast    Result Date: 11/4/2020  EXAM: CT Head Without Intravenous Contrast CLINICAL HISTORY: The patient is 75 years old and is Female; Altered mental status; ams TECHNIQUE: Axial computed tomography images of the head/brain without intravenous contrast.  Sagittal and coronal reformatted images were created and reviewed.  This CT exam was performed  using one or more of the following dose reduction techniques:  automated exposure control, adjustment of the mA and/or kV according to patient size, and/or use of iterative reconstruction technique. COMPARISON: CT of the head September 1, 2020 FINDINGS: ARTIFACTS:  The exam is suboptimal secondary to motion artifact. BRAIN:  Evidence of prior infarct in the region of the right basal ganglia is noted. There is diffuse cerebral atrophy present, consistent with this patient's age.  There is patchy hypoattenuation of the deep white matter which is non-specific, but most likely owing to chronic small vessel ischemic change in a patient of this age group.  No intracranial hemorrhage, mass effect, or midline shift is seen. There are no extra-axial fluid collections. VENTRICLES:  Unremarkable.  No ventriculomegaly. BONES/JOINTS:  No acute fracture. SOFT TISSUES:  Unremarkable. SINUSES:  Unremarkable as visualized.  No acute sinusitis. MASTOID AIR CELLS:  Unremarkable as visualized.  No mastoid effusion. ORBITS:  Unremarkable as visualized. IMPRESSION: Age-related atrophy and chronic white matter ischemic changes, with no evidence of an acute intracranial abnormality. Electronically signed by:  Lesly Alfaro MD  11/4/2020 3:47 AM CST Workstation: [id number]    X-ray Chest Ap Portable    Result Date: 11/15/2020  EXAMINATION: XR CHEST AP PORTABLE CLINICAL HISTORY: Sepsis; FINDINGS: Portable chest at 1937 compared with 11/07/2020 shows unchanged enlarged cardiac silhouette size with normal mediastinal contours.  Prior cardiac valve replacement again evident. Central pulmonary vascular prominence remains unchanged as do diffuse pulmonary mild interstitial opacities.  Blunting of lateral right costophrenic angle suggesting trace right pleural effusion unchanged.  No acute osseous abnormality.  Vascular stent in right upper arm again evident.     Unchanged cardiomegaly, central pulmonary vascular prominence, and tiny right pleural  effusion. Electronically signed by: Leonel Aguilar MD Date:    11/15/2020 Time:    19:40    X-ray Chest Ap Portable    Result Date: 11/7/2020  HISTORY: Chest pain. FINDINGS: Portable chest radiograph at 2100 hours compared to 1240 hours shows stable enlarged cardiac silhouette, with normal pulmonary vascularity and aortic vascular calcifications. Previous mild central pulmonary vascular congestion has resolved. There are persistent right perihilar and right basilar airspace opacities, with right pleural effusion obscuring right hemidiaphragm. The right upper lung and left lung are normally expanded and clear, with no large left pleural effusion or evidence of pulmonary edema. No pneumothorax. The bones are diffusely osteopenic. IMPRESSION: 1. Persistent cardiomegaly, with resolution of previous central pulmonary vascular congestion. 2. Unchanged right perihilar and right basilar airspace opacities, potentially atelectasis, with small right pleural effusion. Electronically Signed by Brian CARCAMO on 11/8/2020 6:35 AM    X-ray Chest Ap Portable    Result Date: 11/7/2020  EXAMINATION: XR CHEST AP PORTABLE CLINICAL HISTORY: Rectal bleeding; COMPARISON: 11/04/2020 FINDINGS: Cardiac silhouette size is enlarged and stable from prior.  Mitral valve replacement.  Atherosclerotic calcification of the aorta. Small right pleural effusion.  Trace fluid within the right minor fissure.  Prominent central pulmonary vascularity without missy pulmonary edema.  No acute osseous abnormality.     Development of small right pleural effusion. Cardiomegaly with evidence of pulmonary vascular congestion. Electronically signed by: Harish Liu MD Date:    11/07/2020 Time:    12:59    X-ray Chest Ap Portable    Result Date: 11/4/2020  Chest, single view HISTORY: Sepsis. Comparison is made to 10/26/2020. The cardiac silhouette is enlarged but stable. Central pulmonary vascular prominence is unchanged. There are postoperative changes and  arteriosclerosis. Diffuse interstitial and groundglass type pulmonary opacities persist within the lungs. There is more confluent airspace opacity or volume loss in the right lung base, slightly improved in the interval. Minor blunting of the right costophrenic angle is unchanged. Monitoring electrodes overlie the chest. Urographic displaced fracture of the lateral aspect of the right sixth rib is noted. IMPRESSION: Persistence of bilateral interstitial and groundglass opacities with more confluent airspace disease in the right lung base. Stable cardiomegaly. Unchanged blunting of right costophrenic angle. Additional observations as above. Electronically Signed by Camacho Arce M.D. on 11/4/2020 6:51 AM    12 lead EKG with Normal sinus rhythm left axis deviation good R-wave progression LVH by voltage ST depression T-wave inversion in the lateral leads rate 90  millisecond    ASSESSMENT & PLAN:   Griselda Neely is a 75 y.o. female admitted for    1.Acute Blood Loss Anemia  Due to GI bleed  Complicated by anemia of chronic disease  Chronically anticoagulated due to a-fib with atrial thrombus  - trending H&H  -  transfusion with HD 2 units of prbc  - PPI   - hold  home coumadin for now has been taking 2.5mg daily  - daily INR  - GI and cardiology consult    2. ESRD  - HD ( last HD was yesterday) may need repeat today  - Consult Nephro Dr. Ingram        3. Acute on Chronic systolic/diastolic HF  - continue home meds      4. Chronic Atrial Fib  -rate controlled     5. Other chronic conditions: home meds as appropriate  Electrolyte derangement:  Replacement prn  VTE ppx: SCDs, actively bleeding  DNR      Critical care time spent 58 minutes  High risk for clinical  DVT Prophylaxis: will be placed on SCDs for DVT prophylaxis and will be advised to be as mobile as possible and sit in a chair as tolerated.   ________________________________________________________________________________      Face-to-Face encounter  date: 12/01/2020  Encounter included review of the medical records, interviewing and examining the patient face-to-face, discussion with family and other health care providers including emergency medicine physician, admission orders, interpreting lab/test results and formulating a plan of care.   Medical Decision Making during this encounter was  [_] Low Complexity  [_] Moderate Complexity  [x] High Complexity  _________________________________________________________________________________    INPATIENT LIST OF MEDICATIONS   No current facility-administered medications for this encounter.     Current Outpatient Medications:     aspirin (ECOTRIN) 81 MG EC tablet, Take 1 tablet (81 mg total) by mouth once daily., Disp: 30 tablet, Rfl: 0    calcium acetate (PHOSLO) 667 mg capsule, Take 667 mg by mouth once daily. , Disp: , Rfl:     folic acid/vit B complex and C (JENNIFER-KODAK ORAL), Take 1 tablet by mouth once daily., Disp: , Rfl:     isosorbide mononitrate (IMDUR) 30 MG 24 hr tablet, Take 30 mg by mouth once daily., Disp: , Rfl:     losartan (COZAAR) 25 MG tablet, Take 25 mg by mouth once daily., Disp: , Rfl:     magnesium oxide (MAG-OX) 400 mg (241.3 mg magnesium) tablet, Take 1 tablet by mouth once daily (Patient taking differently: Take 400 mg by mouth once daily. ), Disp: 30 tablet, Rfl: 0    metoprolol succinate (TOPROL-XL) 25 MG 24 hr tablet, Take 1 tablet (25 mg total) by mouth nightly., Disp: 30 tablet, Rfl: 0    ondansetron (ZOFRAN-ODT) 4 MG TbDL, Take 4 mg by mouth every 6 (six) hours as needed., Disp: , Rfl:     pantoprazole (PROTONIX) 40 MG tablet, Take 1 tablet (40 mg total) by mouth once daily., Disp: 90 tablet, Rfl: 0    warfarin (COUMADIN) 2 MG tablet, warfarin 2 mg tablet, Disp: , Rfl:     zinc sulfate 220 mg Tab tablet, Take 220 mg by mouth every other day., Disp: , Rfl:     diltiaZEM (DILACOR XR) 240 MG CDCR, Take 1 capsule (240 mg total) by mouth once daily., Disp: 30 capsule, Rfl: 11     levalbuterol (XOPENEX) 0.63 mg/3 mL nebulizer solution, Take 3 mLs (0.63 mg total) by nebulization every 8 (eight) hours as needed for Wheezing or Shortness of Breath. Rescue, Disp: 1 Box, Rfl: 4    oxyCODONE-acetaminophen (PERCOCET) 5-325 mg per tablet, Take 1 tablet by mouth every 6 (six) hours as needed for Pain., Disp: , Rfl:     traMADoL (ULTRAM) 50 mg tablet, Take 50 mg by mouth 3 (three) times a week., Disp: , Rfl:     wheelchair An, 1 Device by Misc.(Non-Drug; Combo Route) route as needed (needed for transport)., Disp: , Rfl: 0      Scheduled Meds:  Continuous Infusions:  PRN Meds:.      Chery Salazar  [facility] Hospitalist NP  12/01/2020

## 2020-12-01 NOTE — ED NOTES
Pt stated that earlier tonight she became SOB. Pt stated that she has a cough that has been persistent since she was diagnosed with pneumonia. Cough is productive. Pt is on 4LPM O2 NC at home. Son stated that SpO2 dropped to 90 at home prior to coming to ED. Pt denies fever and chest pain. Pt states she is losing blood because she has black stools.

## 2020-12-01 NOTE — ED NOTES
"RN to bedside. Pt keeps repeating, "I cant breathe. I cant breathe." RN attempts to calm the patient.Pt is currently on 6 lpm/nc and SpO2 sats are 100%. She is hunched over on the stretcher toward her right side. She is repositioned but quickly returns to a Right-sided position.   "

## 2020-12-01 NOTE — ED NOTES
"First contact with pt. Son is at bedside. She is connected to beside monitor. She is "talking out of her head" and son says that she does this when she gets fluid overload and needs dialysis. She does however, answer direct questions and speaks coherently intermixed with the nonsensical speech. The son is asking when she will have dialysis, because they were told it would happen this morning. The son also states they told her yesterday at dialysis that her blood count was "low."     RN will message Dr. Chamorro to find out more regarding dialysis and when that might take place.   "

## 2020-12-01 NOTE — CONSULTS
[facility]  Nephrology  Consult Note    Patient Name: Griselda Neely  MRN: [id number]  Admission Date: 12/1/2020  Hospital Length of Stay: 0 days  Attending Provider: Maris Chamorro DO   Primary Care Physician: Kalie Neely MD  Principal Problem:<principal problem not specified>    Consults  Subjective:     HPI: Griselda Neely is a 71-year-old female who arrived to [facility] today for evaluation of increasing shortness of breath..  PMHx including: anemia, atrial fibrillation, CHF, hypertension, PVD, ESRD, and calciphylaxis.  Recent hospitalizations for pneumonia and twice for GI bleed.  Apparently, her low blood count was noted in dialysis and she had upcoming follow-up appointment with GI.  Nephrology consulted for ESRD management.  She attends [facility] M-W-F schedule.  She was last dialyzed yesterday without complication.  Shortness of breath developed during the night. She is well-known to our practice and her nephrologist is Dr. Ingram.    BNP >4500  CXR; bilateral pleural effusions    Admitted for CHF exacerbation and acute blood loss    Ativan was given for anxiety this morning and plan is for HD with 2 units PRBC transfusion.         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    both legs    CHF (congestive heart failure)     Encounter for blood transfusion 03/2016    Gout     Hemodialysis access, AV graft     Hypertension     Mitral valve regurgitation     Osteoarthritis     Pancreatitis     Peripheral vascular disease     Pneumonia 09/09/2017    Renal failure        Past Surgical History:   Procedure Laterality Date    ACHILLES TENDON SURGERY Right     ANGIOGRAPHY OF ARTERIOVENOUS SHUNT Right 1/7/2020    Procedure: Fistulogram with Possible Intervention;  Surgeon: Jospeh oLyola MD;  Location: [facility] CATH/EP LAB;  Service: Cardiology;  Laterality: Right;    ANGIOGRAPHY OF LOWER EXTREMITY Left 3/18/2020     Procedure: Angiogram Extremity Unilateral;  Surgeon: Ali Khoobehi, MD;  Location: [facility] CATH/EP LAB;  Service: Cardiology;  Laterality: Left;    APPENDECTOMY      CARDIAC CATHETERIZATION  07/03/2017    CHOLECYSTECTOMY      COLONOSCOPY Left 10/4/2020    Procedure: COLONOSCOPY;  Surgeon: Jordan Kilpatrick MD;  Location: [facility] ENDO;  Service: Endoscopy;  Laterality: Left;    ESOPHAGOGASTRODUODENOSCOPY Left 8/17/2020    Procedure: EGD (ESOPHAGOGASTRODUODENOSCOPY);  Surgeon: Talat Trevizo MD;  Location: [facility] ENDO;  Service: Endoscopy;  Laterality: Left;    ESOPHAGOGASTRODUODENOSCOPY Left 10/2/2020    Procedure: EGD (ESOPHAGOGASTRODUODENOSCOPY);  Surgeon: Talat Trevizo MD;  Location: [facility] ENDO;  Service: Endoscopy;  Laterality: Left;    heal surgery Right     HYSTERECTOMY      INSERTION OF STENT INTO PERIPHERAL VESSEL N/A 1/7/2020    Procedure: INSERTION, STENT, VESSEL, PERIPHERAL;  Surgeon: Joseph Loyola MD;  Location: [facility] CATH/EP LAB;  Service: Cardiology;  Laterality: N/A;    MITRAL VALVE REPLACEMENT      PARATHYROIDECTOMY      PARATHYROIDECTOMY  07/13/2017    PERCUTANEOUS TRANSLUMINAL ANGIOPLASTY OF ARTERIOVENOUS FISTULA N/A 1/7/2020    Procedure: PTA, AV FISTULA;  Surgeon: Joseph Loyola MD;  Location: [facility] CATH/EP LAB;  Service: Cardiology;  Laterality: N/A;    PERITONEAL CATHETER INSERTION      RENAL BIOPSY      vocal cord nodule      WOUND DEBRIDEMENT Left 7/13/2020    Procedure: DEBRIDEMENT, WOUND;  Surgeon: Carlos Elam III, MD;  Location: [facility] OR;  Service: General;  Laterality: Left;       Review of patient's allergies indicates:  No Known Allergies  Current Facility-Administered Medications   Medication Frequency    0.9%  NaCl infusion (for blood administration) Q24H PRN    0.9%  NaCl infusion PRN    calcium acetate(phosphat bind) capsule 667 mg Daily    chlorhexidine 0.12 % solution 15 mL BID    diltiaZEM 24 hr capsule 240 mg Daily    HYDROmorphone injection 1 mg Q6H  PRN    isosorbide mononitrate 24 hr tablet 30 mg Daily    levalbuterol nebulizer solution 0.63 mg Q8H PRN    losartan tablet 25 mg Daily    magnesium oxide tablet 400 mg Daily    melatonin tablet 6 mg Nightly PRN    metoprolol injection 5 mg Q5 Min PRN    metoprolol succinate (TOPROL-XL) 24 hr tablet 25 mg Nightly    mupirocin 2 % ointment BID    nicotine 14 mg/24 hr 1 patch Daily    ondansetron disintegrating tablet 4 mg Q6H PRN    oxyCODONE-acetaminophen 5-325 mg per tablet 1 tablet Q6H PRN    pantoprazole EC tablet 40 mg Daily    sodium chloride 0.9% flush 10 mL PRN    [START ON 12/2/2020] sodium thiosulfate 12.5 gram/50 mL (250 mg/mL) injection 25 g Every Mon, Wed, Fri    traMADoL tablet 50 mg Three Times Weekly     Family History     Problem Relation (Age of Onset)    Arthritis Mother    Diabetes Mother, Father    Early death Sister, Sister    Heart disease Sister, Maternal Grandfather, Mother    Heart failure Mother    Hypertension Mother        Tobacco Use    Smoking status: Current Some Day Smoker     Packs/day: 0.50     Years: 45.00     Pack years: 22.50     Types: Cigarettes    Smokeless tobacco: Never Used   Substance and Sexual Activity    Alcohol use: No    Drug use: No    Sexual activity: Not Currently     Review of Systems   Unable to perform ROS: Acuity of condition     Objective:     Vital Signs (Most Recent):  Temp: 98 °F (36.7 °C) (12/01/20 1330)  Pulse: (!) 112 (12/01/20 1415)  Resp: 18 (12/01/20 1516)  BP: (!) 185/88 (12/01/20 1415)  SpO2: 96 % (12/01/20 1400)  O2 Device (Oxygen Therapy): nasal cannula (12/01/20 1301) Vital Signs (24h Range):  Temp:  [97.8 °F (36.6 °C)-98.4 °F (36.9 °C)] 98 °F (36.7 °C)  Pulse:  [] 112  Resp:  [18-36] 18  SpO2:  [83 %-100 %] 96 %  BP: (145-228)/() 185/88     Weight: 60.1 kg (132 lb 7.9 oz) (12/01/20 1021)  Body mass index is 22.05 kg/m².  Body surface area is 1.66 meters squared.    No intake/output data recorded.    Physical  Exam  Vitals signs and nursing note reviewed.   Constitutional:       General: She is in acute distress.      Appearance: She is ill-appearing.   HENT:      Head: Normocephalic and atraumatic.      Nose: Nose normal.      Mouth/Throat:      Mouth: Mucous membranes are moist.   Eyes:      Conjunctiva/sclera: Conjunctivae normal.      Pupils: Pupils are equal, round, and reactive to light.   Neck:      Musculoskeletal: Normal range of motion and neck supple.      Comments: Neck veins noted  Cardiovascular:      Rate and Rhythm: Tachycardia present.   Pulmonary:      Breath sounds: No stridor. Wheezing present.      Comments: Expiratory wheezing throughout  Tachypneic  Abdominal:      General: There is no distension.      Palpations: Abdomen is soft.      Tenderness: There is no abdominal tenderness.   Musculoskeletal:      Right lower leg: No edema.      Left lower leg: No edema.   Skin:     General: Skin is warm and dry.   Neurological:      Mental Status: She is alert.      Comments: Unable to assess; too anxious         Significant Labs:  CBC:   Recent Labs   Lab 12/01/20  0420   WBC 7.51   RBC 2.33*   HGB 6.3*   HCT 21.7*      MCV 93   MCH 27.0   MCHC 29.0*     CMP:   Recent Labs   Lab 12/01/20  0420   GLU 83   CALCIUM 8.0*   ALBUMIN 2.9*   PROT 7.9      K 5.1   CO2 26   CL 97   BUN 79*   CREATININE 6.1*   ALKPHOS 102   ALT 9*   AST 14   BILITOT 0.7     All labs within the past 24 hours have been reviewed.    Significant Imaging:    Assessment/Plan:     Active Diagnoses:    Diagnosis Date Noted POA    Acute blood loss anemia [D62] 10/01/2020 Yes    Anemia [D64.9] 03/17/2020 Yes     Chronic      Problems Resolved During this Admission:     Assessment and plan:     Acute blood loss Anemia:  -2 units PRBC infusing with HD  -EGD in am  -GI following     ESRD:  -M-W-F schedule; volume overload- HD in progress today; 4 L UF goal  -renal chemistries stable  -sodium thiosulfate for calciphylaxis  -avoid  nephrotoxins, strict I&O, and daily weight     Renal BMD:  -Ca corrects for hypoalbuminemia  -check phos;continue binder for now     Hypertension:  -uncontrolled  -continue home meds; titrate as needed  -HM following     Chronic systolic and diastolic heart failure:  -CXR revealing- small bilateral infiltrates  -last echo (11/20) EF 48%  -chronically elevated BNP >4500  -continue home meds       Thank you for your consult. I will follow-up with patient. Please contact us if you have any additional questions.    Gabe Barksdale, KANE  Nephrology  [facility]

## 2020-12-01 NOTE — PLAN OF CARE
Pt refuses to talk so called sister Aide Watson, who is her cg and she lives with to see if anything has changed since the last hospitalization. Aide states she starts to get in trouble when she has the 2 days over the weekend that she does not have dialysis. Cm to go home with no cm needs. Cm to follow until discharge from hospital.    12/01/20 1415   Discharge Assessment   Assessment Type Discharge Planning Assessment   Confirmed/corrected address and phone number on facesheet? Yes   Assessment information obtained from? Other  (Dileep Lind son [phone])   Communicated expected length of stay with patient/caregiver no   Prior to hospitilization cognitive status: Unable to Assess   Prior to hospitalization functional status: Independent   Current cognitive status: Alert/Oriented   Current Functional Status: Partially Dependent   Lives With sibling(s)   Able to Return to Prior Arrangements yes   Is patient able to care for self after discharge? Unable to determine at this time (comments)   Who are your caregiver(s) and their phone number(s)? Jovany pierson [phone]  Aide Watson sister  [phone]   Patient's perception of discharge disposition home or selfcare   Readmission Within the Last 30 Days previous discharge plan unsuccessful   If yes, most recent facility name: [facility]   Patient currently being followed by outpatient case management? No   Patient currently receives any other outside agency services? No   Equipment Currently Used at Home wheelchair;cane, straight   Part D Coverage [employer]   Do you have any problems affording any of your prescribed medications? No   Is the patient taking medications as prescribed? yes   Does the patient have transportation home? Yes   Transportation Anticipated family or friend will provide   [facility]   Does the patient receive services at the [facility]mad[facility] Clinic? No   Discharge Plan A Home with family    Discharge Plan B Home with family   DME Needed Upon Discharge  none   Readmission Questionnaire   At the time of your discharge, did someone talk to you about what your health problems were? Yes   At the time of discharge, did someone talk to you about what to watch out for regarding worsening of your health problem? Yes   At the time of discharge, did someone talk to you about what to do if you experienced worsening of your health problem? Yes   At the time of discharge, did someone talk to you about which medication to take when you left the hospital and which ones to stop taking? Yes   At the time of discharge, did someone talk to you about when and where to follow up with a doctor after you left the hospital? Yes   What do you believe caused you to be sick enough to be re-admitted? Anemia   How often do you need to have someone help you when you read instructions, pamphlets, or other written material from your doctor or pharmacy? Sometimes   Do you have problems taking your medications as prescribed? No   Do you have any problems affording any of  your prescribed medications? No   Do you have problems obtaining/receiving your medications? No   Does the patient have transportation to healthcare appointments? Yes   Living Arrangements house   Does the patient have family/friends to help with healtcare needs after discharge? yes   Does your caregiver provide all the help you need? Yes

## 2020-12-02 ENCOUNTER — ANESTHESIA (OUTPATIENT)
Dept: SURGERY | Facility: HOSPITAL | Age: 75
DRG: 377 | End: 2020-12-02
Payer: MEDICARE

## 2020-12-02 ENCOUNTER — ANESTHESIA EVENT (OUTPATIENT)
Dept: SURGERY | Facility: HOSPITAL | Age: 75
DRG: 377 | End: 2020-12-02
Payer: MEDICARE

## 2020-12-02 PROBLEM — M89.9 CHRONIC KIDNEY DISEASE-MINERAL AND BONE DISORDER: Status: ACTIVE | Noted: 2020-12-02

## 2020-12-02 PROBLEM — E83.9 CHRONIC KIDNEY DISEASE-MINERAL AND BONE DISORDER: Status: ACTIVE | Noted: 2020-12-02

## 2020-12-02 PROBLEM — N18.9 CHRONIC KIDNEY DISEASE-MINERAL AND BONE DISORDER: Status: ACTIVE | Noted: 2020-12-02

## 2020-12-02 LAB
ANION GAP SERPL CALC-SCNC: 14 MMOL/L (ref 8–16)
BASOPHILS # BLD AUTO: 0.04 K/UL (ref 0–0.2)
BASOPHILS # BLD AUTO: 0.04 K/UL (ref 0–0.2)
BASOPHILS NFR BLD: 0.7 % (ref 0–1.9)
BASOPHILS NFR BLD: 0.7 % (ref 0–1.9)
BUN SERPL-MCNC: 52 MG/DL (ref 8–23)
CALCIUM SERPL-MCNC: 8.1 MG/DL (ref 8.7–10.5)
CHLORIDE SERPL-SCNC: 96 MMOL/L (ref 95–110)
CO2 SERPL-SCNC: 26 MMOL/L (ref 23–29)
CREAT SERPL-MCNC: 4.7 MG/DL (ref 0.5–1.4)
DIFFERENTIAL METHOD: ABNORMAL
DIFFERENTIAL METHOD: ABNORMAL
EOSINOPHIL # BLD AUTO: 0.1 K/UL (ref 0–0.5)
EOSINOPHIL # BLD AUTO: 0.1 K/UL (ref 0–0.5)
EOSINOPHIL NFR BLD: 1.1 % (ref 0–8)
EOSINOPHIL NFR BLD: 1.1 % (ref 0–8)
ERYTHROCYTE [DISTWIDTH] IN BLOOD BY AUTOMATED COUNT: 19 % (ref 11.5–14.5)
ERYTHROCYTE [DISTWIDTH] IN BLOOD BY AUTOMATED COUNT: 19 % (ref 11.5–14.5)
EST. GFR  (AFRICAN AMERICAN): 9.9 ML/MIN/1.73 M^2
EST. GFR  (NON AFRICAN AMERICAN): 8.6 ML/MIN/1.73 M^2
GLUCOSE SERPL-MCNC: 50 MG/DL (ref 70–110)
GLUCOSE SERPL-MCNC: 75 MG/DL (ref 70–110)
GLUCOSE SERPL-MCNC: 96 MG/DL (ref 70–110)
HCT VFR BLD AUTO: 25.8 % (ref 37–48.5)
HCT VFR BLD AUTO: 25.8 % (ref 37–48.5)
HGB BLD-MCNC: 7.7 G/DL (ref 12–16)
HGB BLD-MCNC: 7.7 G/DL (ref 12–16)
IMM GRANULOCYTES # BLD AUTO: 0.02 K/UL (ref 0–0.04)
IMM GRANULOCYTES # BLD AUTO: 0.02 K/UL (ref 0–0.04)
IMM GRANULOCYTES NFR BLD AUTO: 0.3 % (ref 0–0.5)
IMM GRANULOCYTES NFR BLD AUTO: 0.3 % (ref 0–0.5)
INR PPP: 1.3
LYMPHOCYTES # BLD AUTO: 0.7 K/UL (ref 1–4.8)
LYMPHOCYTES # BLD AUTO: 0.7 K/UL (ref 1–4.8)
LYMPHOCYTES NFR BLD: 10.8 % (ref 18–48)
LYMPHOCYTES NFR BLD: 10.8 % (ref 18–48)
MCH RBC QN AUTO: 27.1 PG (ref 27–31)
MCH RBC QN AUTO: 27.1 PG (ref 27–31)
MCHC RBC AUTO-ENTMCNC: 29.8 G/DL (ref 32–36)
MCHC RBC AUTO-ENTMCNC: 29.8 G/DL (ref 32–36)
MCV RBC AUTO: 91 FL (ref 82–98)
MCV RBC AUTO: 91 FL (ref 82–98)
MONOCYTES # BLD AUTO: 0.5 K/UL (ref 0.3–1)
MONOCYTES # BLD AUTO: 0.5 K/UL (ref 0.3–1)
MONOCYTES NFR BLD: 8.5 % (ref 4–15)
MONOCYTES NFR BLD: 8.5 % (ref 4–15)
NEUTROPHILS # BLD AUTO: 4.8 K/UL (ref 1.8–7.7)
NEUTROPHILS # BLD AUTO: 4.8 K/UL (ref 1.8–7.7)
NEUTROPHILS NFR BLD: 78.6 % (ref 38–73)
NEUTROPHILS NFR BLD: 78.6 % (ref 38–73)
NRBC BLD-RTO: 0 /100 WBC
NRBC BLD-RTO: 0 /100 WBC
PLATELET # BLD AUTO: 152 K/UL (ref 150–350)
PLATELET # BLD AUTO: 152 K/UL (ref 150–350)
PMV BLD AUTO: 10.8 FL (ref 9.2–12.9)
PMV BLD AUTO: 10.8 FL (ref 9.2–12.9)
POTASSIUM SERPL-SCNC: 4.5 MMOL/L (ref 3.5–5.1)
PROTHROMBIN TIME: 15.8 SEC (ref 10.6–14.8)
RBC # BLD AUTO: 2.84 M/UL (ref 4–5.4)
RBC # BLD AUTO: 2.84 M/UL (ref 4–5.4)
SODIUM SERPL-SCNC: 136 MMOL/L (ref 136–145)
WBC # BLD AUTO: 6.09 K/UL (ref 3.9–12.7)
WBC # BLD AUTO: 6.09 K/UL (ref 3.9–12.7)

## 2020-12-02 PROCEDURE — 27000675 HC TUBING MICRODRIP: Performed by: NURSE ANESTHETIST, CERTIFIED REGISTERED

## 2020-12-02 PROCEDURE — 27100019 HC AMBU BAG ADULT/PED: Performed by: ANESTHESIOLOGY

## 2020-12-02 PROCEDURE — 27202106: Performed by: ANESTHESIOLOGY

## 2020-12-02 PROCEDURE — 44378 SMALL BOWEL ENDOSCOPY: CPT | Performed by: INTERNAL MEDICINE

## 2020-12-02 PROCEDURE — 36415 COLL VENOUS BLD VENIPUNCTURE: CPT

## 2020-12-02 PROCEDURE — 85610 PROTHROMBIN TIME: CPT

## 2020-12-02 PROCEDURE — 25000003 PHARM REV CODE 250

## 2020-12-02 PROCEDURE — 96374 THER/PROPH/DIAG INJ IV PUSH: CPT | Performed by: INTERNAL MEDICINE

## 2020-12-02 PROCEDURE — 27202103: Performed by: NURSE ANESTHETIST, CERTIFIED REGISTERED

## 2020-12-02 PROCEDURE — 25000003 PHARM REV CODE 250: Performed by: NURSE PRACTITIONER

## 2020-12-02 PROCEDURE — 27000671 HC TUBING MICROBORE EXT: Performed by: NURSE ANESTHETIST, CERTIFIED REGISTERED

## 2020-12-02 PROCEDURE — 99900035 HC TECH TIME PER 15 MIN (STAT)

## 2020-12-02 PROCEDURE — 25000003 PHARM REV CODE 250: Performed by: NURSE ANESTHETIST, CERTIFIED REGISTERED

## 2020-12-02 PROCEDURE — 37000009 HC ANESTHESIA EA ADD 15 MINS: Performed by: INTERNAL MEDICINE

## 2020-12-02 PROCEDURE — 12000002 HC ACUTE/MED SURGE SEMI-PRIVATE ROOM

## 2020-12-02 PROCEDURE — 25000003 PHARM REV CODE 250: Performed by: ANESTHESIOLOGY

## 2020-12-02 PROCEDURE — 37000008 HC ANESTHESIA 1ST 15 MINUTES: Performed by: INTERNAL MEDICINE

## 2020-12-02 PROCEDURE — 27000284 HC CANNULA NASAL: Performed by: NURSE ANESTHETIST, CERTIFIED REGISTERED

## 2020-12-02 PROCEDURE — 27200997: Performed by: INTERNAL MEDICINE

## 2020-12-02 PROCEDURE — 27202049 HC PROBE, APC ERBE: Performed by: INTERNAL MEDICINE

## 2020-12-02 PROCEDURE — 63600175 PHARM REV CODE 636 W HCPCS: Mod: TB | Performed by: INTERNAL MEDICINE

## 2020-12-02 PROCEDURE — 80048 BASIC METABOLIC PNL TOTAL CA: CPT

## 2020-12-02 PROCEDURE — 90935 HEMODIALYSIS ONE EVALUATION: CPT

## 2020-12-02 PROCEDURE — 25000003 PHARM REV CODE 250: Performed by: INTERNAL MEDICINE

## 2020-12-02 PROCEDURE — 63600175 PHARM REV CODE 636 W HCPCS: Performed by: NURSE ANESTHETIST, CERTIFIED REGISTERED

## 2020-12-02 PROCEDURE — 85025 COMPLETE CBC W/AUTO DIFF WBC: CPT

## 2020-12-02 PROCEDURE — 82962 GLUCOSE BLOOD TEST: CPT | Performed by: INTERNAL MEDICINE

## 2020-12-02 RX ORDER — PHENYLEPHRINE HYDROCHLORIDE 10 MG/ML
INJECTION INTRAVENOUS
Status: DISCONTINUED | OUTPATIENT
Start: 2020-12-02 | End: 2020-12-02

## 2020-12-02 RX ORDER — PROPOFOL 10 MG/ML
VIAL (ML) INTRAVENOUS
Status: DISCONTINUED | OUTPATIENT
Start: 2020-12-02 | End: 2020-12-02

## 2020-12-02 RX ORDER — HYDRALAZINE HYDROCHLORIDE 25 MG/1
25 TABLET, FILM COATED ORAL EVERY 8 HOURS PRN
Status: DISCONTINUED | OUTPATIENT
Start: 2020-12-02 | End: 2020-12-03 | Stop reason: HOSPADM

## 2020-12-02 RX ORDER — DEXTROSE 50 % IN WATER (D50W) INTRAVENOUS SYRINGE
25 ONCE
Status: COMPLETED | OUTPATIENT
Start: 2020-12-02 | End: 2020-12-02

## 2020-12-02 RX ORDER — SODIUM CHLORIDE 9 MG/ML
INJECTION, SOLUTION INTRAVENOUS CONTINUOUS PRN
Status: DISCONTINUED | OUTPATIENT
Start: 2020-12-02 | End: 2020-12-02

## 2020-12-02 RX ORDER — DIPHENHYDRAMINE HYDROCHLORIDE 50 MG/ML
25 INJECTION INTRAMUSCULAR; INTRAVENOUS EVERY 6 HOURS PRN
Status: DISCONTINUED | OUTPATIENT
Start: 2020-12-02 | End: 2020-12-03 | Stop reason: HOSPADM

## 2020-12-02 RX ADMIN — PHENYLEPHRINE HYDROCHLORIDE 200 MCG: 10 INJECTION INTRAVENOUS at 08:12

## 2020-12-02 RX ADMIN — PROPOFOL 20 MG: 10 INJECTION, EMULSION INTRAVENOUS at 08:12

## 2020-12-02 RX ADMIN — DEXTROSE MONOHYDRATE 25 G: 25 INJECTION, SOLUTION INTRAVENOUS at 08:12

## 2020-12-02 RX ADMIN — DILTIAZEM HYDROCHLORIDE 240 MG: 120 CAPSULE, COATED, EXTENDED RELEASE ORAL at 04:12

## 2020-12-02 RX ADMIN — ISOSORBIDE MONONITRATE 30 MG: 30 TABLET, EXTENDED RELEASE ORAL at 04:12

## 2020-12-02 RX ADMIN — METOPROLOL SUCCINATE 25 MG: 25 TABLET, FILM COATED, EXTENDED RELEASE ORAL at 08:12

## 2020-12-02 RX ADMIN — LOSARTAN POTASSIUM 25 MG: 25 TABLET, FILM COATED ORAL at 04:12

## 2020-12-02 RX ADMIN — SODIUM THIOSULFATE 25 G: 250 INJECTION, SOLUTION INTRAVENOUS at 04:12

## 2020-12-02 RX ADMIN — DIPHENHYDRAMINE HYDROCHLORIDE 25 MG: 50 INJECTION INTRAMUSCULAR; INTRAVENOUS at 01:12

## 2020-12-02 RX ADMIN — PROPOFOL 30 MG: 10 INJECTION, EMULSION INTRAVENOUS at 08:12

## 2020-12-02 RX ADMIN — EPOETIN ALFA-EPBX 6000 UNITS: 10000 INJECTION, SOLUTION INTRAVENOUS; SUBCUTANEOUS at 01:12

## 2020-12-02 RX ADMIN — SIMETHICONE 20 MG: 20 SUSPENSION/ DROPS ORAL at 08:12

## 2020-12-02 RX ADMIN — SODIUM CHLORIDE: 0.9 INJECTION, SOLUTION INTRAVENOUS at 07:12

## 2020-12-02 RX ADMIN — PROPOFOL 50 MG: 10 INJECTION, EMULSION INTRAVENOUS at 08:12

## 2020-12-02 NOTE — ANESTHESIA POSTPROCEDURE EVALUATION
Anesthesia Post Evaluation    Patient: Griselda Neely    Procedure(s) Performed: Procedure(s) (LRB):  ENTEROSCOPY (N/A)    Final Anesthesia Type: MAC    Patient location during evaluation: GI PACU  Patient participation: Yes- Able to Participate  Level of consciousness: awake and alert  Post-procedure vital signs: reviewed and stable  Pain management: adequate  Airway patency: patent    PONV status at discharge: No PONV  Anesthetic complications: no      Cardiovascular status: stable  Respiratory status: unassisted  Hydration status: euvolemic  Follow-up not needed.          Vitals Value Taken Time   /67 12/02/20 1000   Temp 37 °C (98.6 °F) 12/02/20 0940   Pulse 87 12/02/20 1000   Resp 16 12/02/20 1000   SpO2 99 % 12/02/20 1000         Event Time   Out of Recovery 12/02/2020 10:04:00         Pain/Skip Score: Pain Rating Prior to Med Admin: 10 (12/1/2020  3:16 PM)

## 2020-12-02 NOTE — [employer] PATIENT INSTRUCTIONS
Discharge Summary/Instructions after an Endoscopic Procedure  Patient Name: Griselda Neely  Patient MRN: [id number]  Patient YOB: 1945 Wednesday, December 2, 2020  Sudheer Brown III, MD  RESTRICTIONS:  During your procedure today, you received medications for sedation.  These   medications may affect your judgment, balance and coordination.  Therefore,   for 24 hours, you have the following restrictions:   - DO NOT drive a car, operate machinery, make legal/financial decisions,   sign important papers or drink alcohol.    ACTIVITY:  Today: no heavy lifting, straining or running due to procedural   sedation/anesthesia.  The following day: return to full activity including work.  DIET:  Eat and drink normally unless instructed otherwise.     TREATMENT FOR COMMON SIDE EFFECTS:  - Mild abdominal pain, nausea, belching, bloating or excessive gas:  rest,   eat lightly and use a heating pad.  - Sore Throat: treat with throat lozenges and/or gargle with warm salt   water.  - Because air was used during the procedure, expelling large amounts of air   from your rectum or belching is normal.  - If a bowel prep was taken, you may not have a bowel movement for 1-3 days.    This is normal.  SYMPTOMS TO WATCH FOR AND REPORT TO YOUR PHYSICIAN:  1. Abdominal pain or bloating, other than gas cramps.  2. Chest pain.  3. Back pain.  4. Signs of infection such as: chills or fever occurring within 24 hours   after the procedure.  5. Rectal bleeding, which would show as bright red, maroon, or black stools.   (A tablespoon of blood from the rectum is not serious, especially if   hemorrhoids are present.)  6. Vomiting.  7. Weakness or dizziness.  GO DIRECTLY TO THE NEAREST EMERGENCY ROOM IF YOU HAVE ANY OF THE FOLLOWING:      Difficulty breathing              Chills and/or fever over 101 F   Persistent vomiting and/or vomiting blood   Severe abdominal pain   Severe chest pain   Black, tarry stools   Bleeding- more than one  tablespoon   Any other symptom or condition that you feel may need urgent attention  Your doctor recommends these additional instructions:  If any biopsies were taken, your doctors clinic will contact you in 1 to 2   weeks with any results.  - Return patient to hospital baez for ongoing care.   - Set up capsule within next week to ensure no other small bowel source.   Dark pigmented material in stomach w/ oozing AVM c/w stomach AVM as likely   source of ongoing melena / anemia.  For questions, problems or results please call your physician - Sudheer Brown III, MD at Work:  [phone].  [facility], EMERGENCY ROOM PHONE NUMBER: [phone]  IF A COMPLICATION OR EMERGENCY SITUATION ARISES AND YOU ARE UNABLE TO REACH   YOUR PHYSICIAN - GO DIRECTLY TO THE EMERGENCY ROOM.  Sudheer Brown III, MD  12/2/2020 8:56:00 AM  This report has been verified and signed electronically.  [employer]

## 2020-12-02 NOTE — TRANSFER OF CARE
"Anesthesia Transfer of Care Note    Patient: Griselda Neely    Procedure(s) Performed: Procedure(s) (LRB):  ENTEROSCOPY (N/A)    Patient location: GI    Anesthesia Type: MAC    Transport from OR: Transported from OR on room air with adequate spontaneous ventilation    Post pain: adequate analgesia    Post assessment: no apparent anesthetic complications    Post vital signs: stable    Level of consciousness: awake and alert    Nausea/Vomiting: no nausea/vomiting    Complications: none    Transfer of care protocol was followed      Last vitals:   Visit Vitals  BP (!) 110/45 (BP Location: Right leg, Patient Position: Lying)   Pulse 84   Temp 36 °C (96.8 °F) (Axillary)   Resp (!) 22   Ht 5' 5" (1.651 m)   Wt 60.1 kg (132 lb 7.9 oz)   LMP  (LMP Unknown)   SpO2 (!) 92%   Breastfeeding No   BMI 22.05 kg/m²     "

## 2020-12-02 NOTE — RESPIRATORY THERAPY
12/01/20 2303   Patient Assessment/Suction   Level of Consciousness (AVPU)   (sleeping )   Respiratory Effort Shallow   Expansion/Accessory Muscles/Retractions expansion symmetric;no retractions;no use of accessory muscles   All Lung Fields Breath Sounds diminished   Rhythm/Pattern, Respiratory pattern regular;unlabored   PRE-TX-O2   O2 Device (Oxygen Therapy) nasal cannula   Flow (L/min) 5   SpO2 95 %   Pulse Oximetry Type Intermittent   $ Pulse Oximetry - Multiple Charge Pulse Oximetry - Multiple   Pulse (!) 59   Resp 20   Aerosol Therapy   $ Aerosol Therapy Charges PRN treatment not required   Respiratory Evaluation   $ Care Plan Tech Time 15 min   Evaluation For   (care plan)        12/01/20 2303   Patient Assessment/Suction   Level of Consciousness (AVPU)   (sleeping )   Respiratory Effort Shallow   Expansion/Accessory Muscles/Retractions expansion symmetric;no retractions;no use of accessory muscles   All Lung Fields Breath Sounds diminished   Rhythm/Pattern, Respiratory pattern regular;unlabored   PRE-TX-O2   O2 Device (Oxygen Therapy) nasal cannula   Flow (L/min) 5   SpO2 95 %   Pulse Oximetry Type Intermittent   $ Pulse Oximetry - Multiple Charge Pulse Oximetry - Multiple   Pulse (!) 59   Resp 20   Aerosol Therapy   $ Aerosol Therapy Charges PRN treatment not required   Respiratory Evaluation   $ Care Plan Tech Time 15 min   Evaluation For   (care plan)

## 2020-12-02 NOTE — PROGRESS NOTES
[facility]  Progress Note    Patient Name: Griselda Neely  MRN: [id number]  Patient Class: IP- Inpatient   Admission Date: 12/1/2020  Length of Stay: 1 days  Attending Physician: Maris Chamorro DO  Primary Care Provider: Kalie Neely MD        Subjective:     Principal Problem:Acute blood loss anemia  Chief Complaint   Patient presents with    Shortness of Breath         Interval History:  Status post 2 units PRBC yesterday with improvement in hemoglobin from 6.3 to 7.7. Patient underwent small bowel enteroscopy today with two bleeding angiodysplastic lesions noted in stomach, treated with ACP and clips.  Patient went to HD today.    Seen following hd.  Patient states she feels improvement to her shortness of breath but feels generalized fatigue.  No further episodes of bleeding noted.      Review of Systems   Per interval history, all other systems reviewed and negative.     Objective:     Vital Signs (Most Recent):  Temp: 98 °F (36.7 °C) (12/02/20 1355)  Pulse: 108 (12/02/20 1355)  Resp: 16 (12/02/20 1355)  BP: (!) 156/85 (12/02/20 1355)  SpO2: 99 % (12/02/20 1000) Vital Signs (24h Range):  Temp:  [96.8 °F (36 °C)-98.6 °F (37 °C)] 98 °F (36.7 °C)  Pulse:  [] 108  Resp:  [15-22] 16  SpO2:  [92 %-100 %] 99 %  BP: (104-181)/(45-86) 156/85     Weight: 60.1 kg (132 lb 7.9 oz)  Body mass index is 22.05 kg/m².    Intake/Output Summary (Last 24 hours) at 12/2/2020 1538  Last data filed at 12/2/2020 1355  Gross per 24 hour   Intake 500 ml   Output 875 ml   Net -375 ml      Physical Exam  Vitals signs and nursing note reviewed.   Constitutional:       Appearance: She is well-developed.   HENT:      Head: Atraumatic.   Eyes:      Conjunctiva/sclera: Conjunctivae normal.      Pupils: Pupils are equal, round, and reactive to light.   Neck:      Musculoskeletal: Normal range of motion and neck supple.      Thyroid: No thyromegaly.      Vascular: No JVD.   Cardiovascular:       Rate and Rhythm: Normal rate and regular rhythm.      Heart sounds: Normal heart sounds. No murmur. No friction rub. No gallop.    Pulmonary:      Effort: Pulmonary effort is normal.      Breath sounds: No wheezing or rales.   Abdominal:      General: Bowel sounds are normal. There is no distension.      Palpations: Abdomen is soft.      Tenderness: There is no abdominal tenderness.   Musculoskeletal: Normal range of motion.   Skin:     General: Skin is warm and dry.   Neurological:      Mental Status: She is alert and oriented to person, place, and time.      Deep Tendon Reflexes: Reflexes are normal and symmetric.   Psychiatric:         Behavior: Behavior normal.         Significant Labs:   CBC:   Recent Labs   Lab 12/01/20  0420 12/02/20  1009   WBC 7.51 6.09  6.09   HGB 6.3* 7.7*  7.7*   HCT 21.7* 25.8*  25.8*    152  152     CMP:   Recent Labs   Lab 12/01/20  0420 12/02/20  0433    136   K 5.1 4.5   CL 97 96   CO2 26 26   GLU 83 50*   BUN 79* 52*   CREATININE 6.1* 4.7*   CALCIUM 8.0* 8.1*   PROT 7.9  --    ALBUMIN 2.9*  --    BILITOT 0.7  --    ALKPHOS 102  --    AST 14  --    ALT 9*  --    ANIONGAP 16 14   EGFRNONAA 6.2* 8.6*     All pertinent labs within the past 24 hours have been reviewed.    Significant Imaging:          Small bowel enteroscopy  Impression:             - Normal esophagus.   - Two bleeding angiodysplastic lesions in the stomach. Treated with argon plasma coagulation  (APC). Clip was placed.    - Normal stomach.   - Normal examined duodenum.    - A single non-bleeding angioectasia in the duodenum. Treated with argon plasma coagulation (APC).   - The examined portion of the jejunum was normal.    - No specimens collected.            Assessment/Plan:      Active Hospital Problems    Diagnosis  POA    *Acute blood loss anemia [D62]  Yes    Chronic kidney disease-mineral and bone disorder [N18.9, E83.9, M89.9]  Yes    Anemia [D64.9]  Yes     Chronic    Chronic systolic heart  failure [I50.22]  Yes    ESRD (end stage renal disease) on HD M,W, F [N18.6]  Yes     Chronic    Gastroesophageal reflux disease [K21.9]  Yes    HTN (hypertension) [I10]  Yes     Chronic      Resolved Hospital Problems   No resolved problems to display.       Plan:  - s/p egd with 2 bleeding avms in stomach noted  - plans for outpatient follow with gi for capsule study  - HD with UF yesterday and HD today  - monitor h/h  - continue home medications for chronic conditions  - monitor sob/respiratory status   - code: dnr        VTE Risk Mitigation (From admission, onward)         Ordered     IP VTE LOW RISK PATIENT  Once      12/01/20 0624     Place ROSANNA hose  Until discontinued      12/01/20 0624                Discharge Planning   SIENNA:      Code Status: DNR   Is the patient medically ready for discharge?:     Reason for patient still in hospital (select all that apply): Treatment  Discharge Plan A: Home with family                  Maris Chamorro DO  Department of Hospital Medicine   [facility]

## 2020-12-02 NOTE — ANESTHESIA PREPROCEDURE EVALUATION
12/02/2020  Griselda Neely is a 75 y.o., female.    Pre-op Assessment    I have reviewed the Patient Summary Reports.     I have reviewed the Nursing Notes. I have reviewed the NPO Status.   I have reviewed the Medications.     Review of Systems  Anesthesia Hx:  History of prior surgery of interest to airway management or planning: heart surgery. Denies Family Hx of Anesthesia complications.   Denies Personal Hx of Anesthesia complications.   Social:  No Alcohol Use, Smoker    Hematology/Oncology:     Oncology Normal    -- Anemia: Hematology Comments: Coumadin.  Transfused yesterday.    EENT/Dental:   Full dentures (out)   Cardiovascular:   Hypertension Valvular problems/Murmurs (s/p MVR) CAD  Dysrhythmias atrial fibrillation CHF PND PVD Recent echo: 48% EF; mod TR, ME, and pulm HTN   Pulmonary:   Pneumonia (about a month ago, nearly resolved) Shortness of breath    Renal/:   Chronic Renal Disease, ESRD, Dialysis    Hepatic/GI:   GERD    Musculoskeletal:   Arthritis  Gout   Neurological:   Headaches        Patient Active Problem List   Diagnosis    HTN (hypertension)    PND (paroxysmal nocturnal dyspnea)    Tobacco dependence    ESRD (end stage renal disease) on HD M,W, F    Hyperparathyroidism    Other persistent atrial fibrillation    Anemia due to chronic kidney disease    Pulmonary hypertension    Non-rheumatic mitral regurgitation    DNR (do not resuscitate)    Coronary arteriosclerosis in native artery    Gastroesophageal reflux disease    S/P parathyroidectomy    Tension-type headache    Hyperparathyroidism due to renal insufficiency    Functional dyspepsia    HLD (hyperlipidemia)    PVD (peripheral vascular disease)    Chronic systolic heart failure    Peripheral vascular disease now with left lower extremity occlusion    H/O mitral valve replacement    Gout    Anemia     Chronic pain on chronic narcotics    Left leg pain    Calciphylaxis    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    Malnutrition of moderate degree    Visit for wound check    Long term current use of anticoagulant    Poorly-controlled hypertension    Acute blood loss anemia    Rectal bleeding    Chronic toe pain, left foot    Fever    Pneumonia    Subtherapeutic anticoagulation    Tachycardia       Past Surgical History:   Procedure Laterality Date    ACHILLES TENDON SURGERY Right     ANGIOGRAPHY OF ARTERIOVENOUS SHUNT Right 1/7/2020    Procedure: Fistulogram with Possible Intervention;  Surgeon: Joseph Loyola MD;  Location: [facility] CATH/EP LAB;  Service: Cardiology;  Laterality: Right;    ANGIOGRAPHY OF LOWER EXTREMITY Left 3/18/2020    Procedure: Angiogram Extremity Unilateral;  Surgeon: Ali Khoobehi, MD;  Location: [facility] CATH/EP LAB;  Service: Cardiology;  Laterality: Left;    APPENDECTOMY      CARDIAC CATHETERIZATION  07/03/2017    CHOLECYSTECTOMY      COLONOSCOPY Left 10/4/2020    Procedure: COLONOSCOPY;  Surgeon: Jordan Kilpatrick MD;  Location: [facility] ENDO;  Service: Endoscopy;  Laterality: Left;    ESOPHAGOGASTRODUODENOSCOPY Left 8/17/2020    Procedure: EGD (ESOPHAGOGASTRODUODENOSCOPY);  Surgeon: Talat Trevizo MD;  Location: [facility] ENDO;  Service: Endoscopy;  Laterality: Left;    ESOPHAGOGASTRODUODENOSCOPY Left 10/2/2020    Procedure: EGD (ESOPHAGOGASTRODUODENOSCOPY);  Surgeon: Talat Trevizo MD;  Location: [facility] ENDO;  Service: Endoscopy;  Laterality: Left;    heal surgery Right     HYSTERECTOMY      INSERTION OF STENT INTO PERIPHERAL VESSEL N/A 1/7/2020    Procedure: INSERTION, STENT, VESSEL, PERIPHERAL;  Surgeon: Joseph Loyola MD;  Location: [facility] CATH/EP LAB;  Service: Cardiology;  Laterality: N/A;    MITRAL VALVE REPLACEMENT      PARATHYROIDECTOMY      PARATHYROIDECTOMY  07/13/2017    PERCUTANEOUS TRANSLUMINAL ANGIOPLASTY OF  ARTERIOVENOUS FISTULA N/A 1/7/2020    Procedure: PTA, AV FISTULA;  Surgeon: Joseph Loyola MD;  Location: [facility] CATH/EP LAB;  Service: Cardiology;  Laterality: N/A;    PERITONEAL CATHETER INSERTION      RENAL BIOPSY      vocal cord nodule      WOUND DEBRIDEMENT Left 7/13/2020    Procedure: DEBRIDEMENT, WOUND;  Surgeon: Carlos Elam III, MD;  Location: [facility] OR;  Service: General;  Laterality: Left;        Tobacco Use:  The patient  reports that she has been smoking cigarettes. She has a 22.50 pack-year smoking history. She has never used smokeless tobacco.     Results for orders placed or performed during the hospital encounter of 12/01/20   EKG 12-lead    Collection Time: 12/01/20  4:24 AM    Narrative    Test Reason : R06.02,    Vent. Rate : 090 BPM     Atrial Rate : 090 BPM     P-R Int : 186 ms          QRS Dur : 088 ms      QT Int : 380 ms       P-R-T Axes : 061 -29 074 degrees     QTc Int : 464 ms    Normal sinus rhythm  Nonspecific T wave abnormality  Abnormal ECG  When compared with ECG of 15-NOV-2020 18:56,  Sinus rhythm has replaced Atrial flutter    Referred By: [email or web address]   SELF           Confirmed By:              Lab Results   Component Value Date    WBC 7.51 12/01/2020    HGB 6.3 (LL) 12/01/2020    HCT 21.7 (L) 12/01/2020    MCV 93 12/01/2020     12/01/2020     BMP  Lab Results   Component Value Date     12/02/2020    K 4.5 12/02/2020    CL 96 12/02/2020    CO2 26 12/02/2020    BUN 52 (H) 12/02/2020    CREATININE 4.7 (H) 12/02/2020    CALCIUM 8.1 (L) 12/02/2020    ANIONGAP 14 12/02/2020    GLU 50 (L) 12/02/2020    GLU 83 12/01/2020    GLU 95 11/18/2020     Results for SRIKANTH LUONG (MRN [id number]) as of 10/4/2020 12:09   Ref. Range 10/2/2020 05:04   INR Unknown 2.0     Results for orders placed during the hospital encounter of 01/02/20   Echo Color Flow Doppler? Yes    Narrative · Eccentric left ventricular hypertrophy.  · Severely decreased left ventricular systolic function.  The estimated   ejection fraction is 25%.  · Grade IV (severe) left ventricular diastolic dysfunction.  · Moderate right ventricular enlargement.  · Moderately reduced right ventricular systolic function.  · Severe left atrial enlargement.  · Layered left atrial thrombus suggested. The thrombus is fixed and   located in the inferior cavity.  · Moderate right atrial enlargement.  · There is a bioprosthetic mitral valve. Prosthetic mitral valve is   normal.  · Moderate tricuspid regurgitation.  · Mild to moderate pulmonary hypertension present. Estimated PA systolic   pressure 50 mm of Hg.  · Mild to moderate pulmonic regurgitation.  · Small posterior pericardial effusion.              Physical Exam  General:  Well nourished    Airway/Jaw/Neck:  Airway Findings: Mouth Opening: Normal Tongue: Normal  General Airway Assessment: Adult  Mallampati: III  TM Distance: Normal, at least 6 cm  Jaw/Neck Findings:  Neck ROM: Normal ROM       Chest/Lungs:  Chest/Lungs Findings: Clear to auscultation, Normal Respiratory Rate     Heart/Vascular:  Heart Findings: Rate: Normal  Rhythm: Irregularly Irregular  Sounds: Normal  Heart murmur: negative       Mental Status:  Mental Status Findings:  Cooperative, Alert and Oriented         Anesthesia Plan  Type of Anesthesia, risks & benefits discussed:  Anesthesia Type:  MAC  Patient's Preference:   Intra-op Monitoring Plan: standard ASA monitors  Intra-op Monitoring Plan Comments:   Post Op Pain Control Plan:   Post Op Pain Control Plan Comments:   Induction:   IV  Beta Blocker:  Patient is on a Beta-Blocker and has received one dose within the past 24 hours (No further documentation required).       Informed Consent: Patient understands risks and agrees with Anesthesia plan.  Questions answered. Anesthesia consent signed with patient.  ASA Score: 4     Day of Surgery Review of History & Physical:    H&P update referred to the provider.     Anesthesia Plan Notes: Limit IVF        Ready For  Surgery From Anesthesia Perspective.

## 2020-12-02 NOTE — SUBJECTIVE & OBJECTIVE
Interval History:  Status post 2 units PRBC yesterday with improvement in hemoglobin from 6.3 to 7.7. Patient underwent small bowel enteroscopy today with two bleeding angiodysplastic lesions noted in stomach, treated with ACP and clips.  Patient went to HD today.    Seen following hd      Review of Systems   Per interval history, all other systems reviewed and negative.     Objective:     Vital Signs (Most Recent):  Temp: 98 °F (36.7 °C) (12/02/20 1355)  Pulse: 108 (12/02/20 1355)  Resp: 16 (12/02/20 1355)  BP: (!) 156/85 (12/02/20 1355)  SpO2: 99 % (12/02/20 1000) Vital Signs (24h Range):  Temp:  [96.8 °F (36 °C)-98.6 °F (37 °C)] 98 °F (36.7 °C)  Pulse:  [] 108  Resp:  [15-22] 16  SpO2:  [92 %-100 %] 99 %  BP: (104-181)/(45-86) 156/85     Weight: 60.1 kg (132 lb 7.9 oz)  Body mass index is 22.05 kg/m².    Intake/Output Summary (Last 24 hours) at 12/2/2020 1538  Last data filed at 12/2/2020 1355  Gross per 24 hour   Intake 500 ml   Output 875 ml   Net -375 ml      Physical Exam  Vitals signs and nursing note reviewed.   Constitutional:       Appearance: She is well-developed.   HENT:      Head: Atraumatic.   Eyes:      Conjunctiva/sclera: Conjunctivae normal.      Pupils: Pupils are equal, round, and reactive to light.   Neck:      Musculoskeletal: Normal range of motion and neck supple.      Thyroid: No thyromegaly.      Vascular: No JVD.   Cardiovascular:      Rate and Rhythm: Normal rate and regular rhythm.      Heart sounds: Normal heart sounds. No murmur. No friction rub. No gallop.    Pulmonary:      Effort: Pulmonary effort is normal.      Breath sounds: No wheezing or rales.   Abdominal:      General: Bowel sounds are normal. There is no distension.      Palpations: Abdomen is soft.      Tenderness: There is no abdominal tenderness.   Musculoskeletal: Normal range of motion.   Skin:     General: Skin is warm and dry.   Neurological:      Mental Status: She is alert and oriented to person, place, and  time.      Deep Tendon Reflexes: Reflexes are normal and symmetric.   Psychiatric:         Behavior: Behavior normal.         Significant Labs:   CBC:   Recent Labs   Lab 12/01/20  0420 12/02/20  1009   WBC 7.51 6.09  6.09   HGB 6.3* 7.7*  7.7*   HCT 21.7* 25.8*  25.8*    152  152     CMP:   Recent Labs   Lab 12/01/20  0420 12/02/20  0433    136   K 5.1 4.5   CL 97 96   CO2 26 26   GLU 83 50*   BUN 79* 52*   CREATININE 6.1* 4.7*   CALCIUM 8.0* 8.1*   PROT 7.9  --    ALBUMIN 2.9*  --    BILITOT 0.7  --    ALKPHOS 102  --    AST 14  --    ALT 9*  --    ANIONGAP 16 14   EGFRNONAA 6.2* 8.6*     All pertinent labs within the past 24 hours have been reviewed.    Significant Imaging:          Small bowel enteroscopy  Impression:             - Normal esophagus.   - Two bleeding angiodysplastic lesions in the stomach. Treated with argon plasma coagulation  (APC). Clip was placed.    - Normal stomach.   - Normal examined duodenum.    - A single non-bleeding angioectasia in the duodenum. Treated with argon plasma coagulation (APC).   - The examined portion of the jejunum was normal.    - No specimens collected.

## 2020-12-02 NOTE — NURSING
Consent and Hep b status verified,removed 0 uf net, post thrill and bruit noted, pt SOB during treatment, oxymask used last 45 min of treatment because patient was mouth breathing and O2 sats started to drop into low 80s.

## 2020-12-03 VITALS
OXYGEN SATURATION: 98 % | HEART RATE: 59 BPM | HEIGHT: 65 IN | RESPIRATION RATE: 16 BRPM | TEMPERATURE: 98 F | WEIGHT: 132.5 LBS | BODY MASS INDEX: 22.08 KG/M2 | SYSTOLIC BLOOD PRESSURE: 176 MMHG | DIASTOLIC BLOOD PRESSURE: 75 MMHG

## 2020-12-03 LAB
ALBUMIN SERPL BCP-MCNC: 2.8 G/DL (ref 3.5–5.2)
ALP SERPL-CCNC: 99 U/L (ref 55–135)
ALT SERPL W/O P-5'-P-CCNC: 9 U/L (ref 10–44)
ANION GAP SERPL CALC-SCNC: 20 MMOL/L (ref 8–16)
AST SERPL-CCNC: 15 U/L (ref 10–40)
BASOPHILS # BLD AUTO: 0.03 K/UL (ref 0–0.2)
BASOPHILS NFR BLD: 0.7 % (ref 0–1.9)
BILIRUB SERPL-MCNC: 0.8 MG/DL (ref 0.1–1)
BUN SERPL-MCNC: 32 MG/DL (ref 8–23)
CALCIUM SERPL-MCNC: 8.8 MG/DL (ref 8.7–10.5)
CHLORIDE SERPL-SCNC: 101 MMOL/L (ref 95–110)
CO2 SERPL-SCNC: 18 MMOL/L (ref 23–29)
CREAT SERPL-MCNC: 4.3 MG/DL (ref 0.5–1.4)
DIFFERENTIAL METHOD: ABNORMAL
EOSINOPHIL # BLD AUTO: 0.1 K/UL (ref 0–0.5)
EOSINOPHIL NFR BLD: 1.5 % (ref 0–8)
ERYTHROCYTE [DISTWIDTH] IN BLOOD BY AUTOMATED COUNT: 19.6 % (ref 11.5–14.5)
EST. GFR  (AFRICAN AMERICAN): 11 ML/MIN/1.73 M^2
EST. GFR  (NON AFRICAN AMERICAN): 9.5 ML/MIN/1.73 M^2
GLUCOSE SERPL-MCNC: 71 MG/DL (ref 70–110)
GLUCOSE SERPL-MCNC: 97 MG/DL (ref 70–110)
HCT VFR BLD AUTO: 28.1 % (ref 37–48.5)
HGB BLD-MCNC: 8.5 G/DL (ref 12–16)
IMM GRANULOCYTES # BLD AUTO: 0.02 K/UL (ref 0–0.04)
IMM GRANULOCYTES NFR BLD AUTO: 0.4 % (ref 0–0.5)
INR PPP: 1.4
LYMPHOCYTES # BLD AUTO: 0.7 K/UL (ref 1–4.8)
LYMPHOCYTES NFR BLD: 15.1 % (ref 18–48)
MCH RBC QN AUTO: 27.6 PG (ref 27–31)
MCHC RBC AUTO-ENTMCNC: 30.2 G/DL (ref 32–36)
MCV RBC AUTO: 91 FL (ref 82–98)
MONOCYTES # BLD AUTO: 0.3 K/UL (ref 0.3–1)
MONOCYTES NFR BLD: 7.4 % (ref 4–15)
NEUTROPHILS # BLD AUTO: 3.4 K/UL (ref 1.8–7.7)
NEUTROPHILS NFR BLD: 74.9 % (ref 38–73)
NRBC BLD-RTO: 0 /100 WBC
PLATELET # BLD AUTO: 168 K/UL (ref 150–350)
PMV BLD AUTO: 10.1 FL (ref 9.2–12.9)
POTASSIUM SERPL-SCNC: 5.2 MMOL/L (ref 3.5–5.1)
PROT SERPL-MCNC: 7.5 G/DL (ref 6–8.4)
PROTHROMBIN TIME: 16.8 SEC (ref 10.6–14.8)
RBC # BLD AUTO: 3.08 M/UL (ref 4–5.4)
SODIUM SERPL-SCNC: 139 MMOL/L (ref 136–145)
WBC # BLD AUTO: 4.57 K/UL (ref 3.9–12.7)

## 2020-12-03 PROCEDURE — 36415 COLL VENOUS BLD VENIPUNCTURE: CPT

## 2020-12-03 PROCEDURE — 25000003 PHARM REV CODE 250

## 2020-12-03 PROCEDURE — 25000003 PHARM REV CODE 250: Performed by: NURSE PRACTITIONER

## 2020-12-03 PROCEDURE — 85610 PROTHROMBIN TIME: CPT

## 2020-12-03 PROCEDURE — 25000003 PHARM REV CODE 250: Performed by: STUDENT IN AN ORGANIZED HEALTH CARE EDUCATION/TRAINING PROGRAM

## 2020-12-03 PROCEDURE — 80053 COMPREHEN METABOLIC PANEL: CPT

## 2020-12-03 PROCEDURE — S4991 NICOTINE PATCH NONLEGEND: HCPCS | Performed by: STUDENT IN AN ORGANIZED HEALTH CARE EDUCATION/TRAINING PROGRAM

## 2020-12-03 PROCEDURE — 85025 COMPLETE CBC W/AUTO DIFF WBC: CPT

## 2020-12-03 RX ORDER — IBUPROFEN 200 MG
1 TABLET ORAL DAILY
Qty: 30 PATCH | Refills: 0 | Status: SHIPPED | OUTPATIENT
Start: 2020-12-03 | End: 2020-12-31 | Stop reason: SDUPTHER

## 2020-12-03 RX ADMIN — CHLORHEXIDINE GLUCONATE 15 ML: 1.2 RINSE ORAL at 08:12

## 2020-12-03 RX ADMIN — DILTIAZEM HYDROCHLORIDE 240 MG: 120 CAPSULE, COATED, EXTENDED RELEASE ORAL at 08:12

## 2020-12-03 RX ADMIN — LOSARTAN POTASSIUM 25 MG: 25 TABLET, FILM COATED ORAL at 08:12

## 2020-12-03 RX ADMIN — CALCIUM ACETATE 667 MG: 667 CAPSULE ORAL at 08:12

## 2020-12-03 RX ADMIN — PANTOPRAZOLE SODIUM 40 MG: 40 TABLET, DELAYED RELEASE ORAL at 08:12

## 2020-12-03 RX ADMIN — MAGNESIUM OXIDE 400 MG: 400 TABLET ORAL at 08:12

## 2020-12-03 RX ADMIN — ISOSORBIDE MONONITRATE 30 MG: 30 TABLET, EXTENDED RELEASE ORAL at 08:12

## 2020-12-03 RX ADMIN — NICOTINE 1 PATCH: 14 PATCH, EXTENDED RELEASE TRANSDERMAL at 08:12

## 2020-12-03 NOTE — PLAN OF CARE
Important Message from Medicare was sign, explained and given to patient/caregiver on 12/03/2020 at 9:04am     answered all questions.

## 2020-12-03 NOTE — PLAN OF CARE
Problem: Fall Injury Risk  Goal: Absence of Fall and Fall-Related Injury  Outcome: Ongoing, Progressing     Problem: Adult Inpatient Plan of Care  Goal: Plan of Care Review  Outcome: Ongoing, Progressing  Goal: Patient-Specific Goal (Individualization)  Outcome: Ongoing, Progressing  Goal: Absence of Hospital-Acquired Illness or Injury  Outcome: Ongoing, Progressing  Goal: Optimal Comfort and Wellbeing  Outcome: Ongoing, Progressing  Goal: Readiness for Transition of Care  Outcome: Ongoing, Progressing  Goal: Rounds/Family Conference  Outcome: Ongoing, Progressing     Problem: Fluid Imbalance (Pneumonia)  Goal: Fluid Balance  Outcome: Ongoing, Progressing     Problem: Infection (Pneumonia)  Goal: Resolution of Infection Signs/Symptoms  Outcome: Ongoing, Progressing     Problem: Respiratory Compromise (Pneumonia)  Goal: Effective Oxygenation and Ventilation  Outcome: Ongoing, Progressing     Problem: Skin Injury Risk Increased  Goal: Skin Health and Integrity  Outcome: Ongoing, Progressing     Problem: Device-Related Complication Risk (Hemodialysis)  Goal: Safe, Effective Therapy Delivery  Outcome: Ongoing, Progressing     Problem: Hemodynamic Instability (Hemodialysis)  Goal: Vital Signs Remain in Desired Range  Outcome: Ongoing, Progressing     Problem: Infection (Hemodialysis)  Goal: Absence of Infection Signs/Symptoms  Outcome: Ongoing, Progressing

## 2020-12-03 NOTE — PLAN OF CARE
12/02/20 2309   Patient Assessment/Suction   Level of Consciousness (AVPU) alert   Positioning   Body Position positioned/repositioned independently   Head of Bed (HOB) HOB at 20-30 degrees   Respiratory Evaluation   $ Care Plan Tech Time 15 min   Evaluation For   (CARE PLAN)

## 2020-12-03 NOTE — PLAN OF CARE
Discharged home with son and [facility] to resume home health.      12/03/20 1001   Final Note   Assessment Type Final Discharge Note   Anticipated Discharge Disposition Home-Health

## 2020-12-03 NOTE — PROGRESS NOTES
[facility]  Nephrology  Progress Note    Patient Name: Griselda Neely  MRN: [id number]  Admission Date: 12/1/2020  Hospital Length of Stay: 1 days  Attending Provider: Maris Chamorro DO   Primary Care Physician: Kalie Neely MD  Principal Problem:Acute blood loss anemia    Consults  Subjective:     Interval History:   -sitting up in bed; feeling better today.  -HD in progress today  -no acute events overnight    Review of patient's allergies indicates:  No Known Allergies  Current Facility-Administered Medications   Medication Frequency    0.9%  NaCl infusion (for blood administration) Q24H PRN    0.9%  NaCl infusion PRN    calcium acetate(phosphat bind) capsule 667 mg Daily    chlorhexidine 0.12 % solution 15 mL BID    diltiaZEM 24 hr capsule 240 mg Daily    diphenhydrAMINE injection 25 mg Q6H PRN    epoetin alfonzo-epbx injection 6,000 Units Every Mon, Wed, Fri    hydrALAZINE tablet 25 mg Q8H PRN    HYDROmorphone injection 1 mg Q6H PRN    isosorbide mononitrate 24 hr tablet 30 mg Daily    levalbuterol nebulizer solution 0.63 mg Q8H PRN    losartan tablet 25 mg Daily    magnesium oxide tablet 400 mg Daily    melatonin tablet 6 mg Nightly PRN    metoprolol injection 5 mg Q5 Min PRN    metoprolol succinate (TOPROL-XL) 24 hr tablet 25 mg Nightly    mupirocin 2 % ointment BID    nicotine 14 mg/24 hr 1 patch Daily    ondansetron disintegrating tablet 4 mg Q6H PRN    oxyCODONE-acetaminophen 5-325 mg per tablet 1 tablet Q6H PRN    pantoprazole EC tablet 40 mg Daily    simethicone 40 mg/0.6 mL oral syringe PRN    sodium chloride 0.9% flush 10 mL PRN    sodium thiosulfate 12.5 gram/50 mL (250 mg/mL) injection 25 g Every Mon, Wed, Fri    traMADoL tablet 50 mg Three Times Weekly       Objective:     Vital Signs (Most Recent):  Temp: 97.5 °F (36.4 °C) (12/02/20 2112)  Pulse: 96 (12/02/20 2112)  Resp: 18 (12/02/20 2112)  BP: (!) 174/80 (12/02/20 2112)  SpO2: 99 % (12/02/20 1000)  O2  Device (Oxygen Therapy): nasal cannula (12/02/20 2112) Vital Signs (24h Range):  Temp:  [96.8 °F (36 °C)-98.8 °F (37.1 °C)] 97.5 °F (36.4 °C)  Pulse:  [] 96  Resp:  [15-22] 18  SpO2:  [92 %-100 %] 99 %  BP: (104-181)/() 174/80     Weight: 60.1 kg (132 lb 7.9 oz) (12/01/20 1021)  Body mass index is 22.05 kg/m².  Body surface area is 1.66 meters squared.    I/O last 3 completed shifts:  In: 1000 [Other:1000]  Out: 5377 [Other:5377]    Physical Exam   Physical Exam  Vitals signs and nursing note reviewed.   Constitutional:       General: Alert     Appearance: She is ill-appearing.   HENT:      Head: Normocephalic and atraumatic.      Nose: Nose normal.      Mouth/Throat:      Mouth: Mucous membranes are moist.   Eyes:      Conjunctiva/sclera: Conjunctivae normal.      Pupils: Pupils are equal, round, and reactive to light.   Neck:      Musculoskeletal: Normal range of motion and neck supple.      Comments: Neck veins noted  Cardiovascular:      Rate and Rhythm: Tachycardia present.   Pulmonary:      Breath sounds: No stridor. Wheezing present.      Comments: faint exp. Wheezing throughout.  Abdominal:      General: There is no distension.      Palpations: Abdomen is soft.      Tenderness: There is no abdominal tenderness.   Musculoskeletal:      Right lower leg: No edema.      Left lower leg: No edema.   Skin:     General: Skin is warm and dry.   Neurological:      Mental Status: She is alert.      Comments: alert and oriented x3    Significant Labs:sure  CBC:   Recent Labs   Lab 12/02/20  1009   WBC 6.09  6.09   RBC 2.84*  2.84*   HGB 7.7*  7.7*   HCT 25.8*  25.8*     152   MCV 91  91   MCH 27.1  27.1   MCHC 29.8*  29.8*     CMP:   Recent Labs   Lab 12/01/20  0420 12/02/20  0433   GLU 83 50*   CALCIUM 8.0* 8.1*   ALBUMIN 2.9*  --    PROT 7.9  --     136   K 5.1 4.5   CO2 26 26   CL 97 96   BUN 79* 52*   CREATININE 6.1* 4.7*   ALKPHOS 102  --    ALT 9*  --    AST 14  --    BILITOT 0.7   --      All labs within the past 24 hours have been reviewed.    Significant Imaging:    Assessment/Plan:     Active Diagnoses:    Diagnosis Date Noted POA    PRINCIPAL PROBLEM:  Acute blood loss anemia [D62] 10/01/2020 Yes    Chronic kidney disease-mineral and bone disorder [N18.9, E83.9, M89.9] 12/02/2020 Yes    Anemia [D64.9] 03/17/2020 Yes     Chronic    Chronic systolic heart failure [I50.22] 02/03/2020 Yes    ESRD (end stage renal disease) on HD M,W, F [N18.6] 05/11/2016 Yes     Chronic    Gastroesophageal reflux disease [K21.9] 02/02/2016 Yes    HTN (hypertension) [I10] 08/15/2013 Yes     Chronic      Problems Resolved During this Admission:     Assessment and plan:     Acute blood loss Anemia:  -improved after PRBC transfusion yesterday  -small bowel Enteroscopy today-2 bleeding angiodysplastic lesions and treated with ACP and clips   -GI following     ESRD:  -M-W-F schedule; HD in progress today; kept even; desaturating without o2  -clinically improved today  -renal chemistries stable  -sodium thiosulfate for calciphylaxis  -avoid nephrotoxins, strict I&O, and daily weight     Renal BMD:  -Ca corrects for hypoalbuminemia  -check phos;continue binder for now     Hypertension:  -uncontrolled  -continue home meds; titrate as needed  - following     Chronic systolic and diastolic heart failure:  -CXR revealing- small bilateral infiltrates  -last echo (11/20) EF 48%  -chronically elevated BNP >4500  -continue home meds         Gabe Barksdale NP  Nephrology  [facility]

## 2020-12-03 NOTE — PLAN OF CARE
Order received for resumption of home health; patient with [facility]. Call placed [phone]; spoke with [place] who is informing intake team of discharge for resumption of care. Also sent via Epic.      12/03/20 6695   Discharge Reassessment   Assessment Type Discharge Planning Reassessment   Anticipated Discharge Disposition Home-Health   Discharge Plan A Home Health   Discharge Plan B Home Health   DME Needed Upon Discharge  none   Post-Acute Status   Post-Acute Authorization Home Health   Home Health Status Set-up Complete

## 2020-12-04 ENCOUNTER — DOCUMENT SCAN (OUTPATIENT)
Dept: HOME HEALTH SERVICES | Facility: HOSPITAL | Age: 75
End: 2020-12-04

## 2020-12-04 ENCOUNTER — TELEPHONE (OUTPATIENT)
Dept: CARDIOLOGY | Facility: CLINIC | Age: 75
End: 2020-12-04

## 2020-12-04 NOTE — TELEPHONE ENCOUNTER
----- Message from Princess NANO Hatfield sent at 12/4/2020 12:58 PM CST -----  Contact: Home Health  Type: Needs Medical Advice  Who Called:  Zoya ledesma/ Home Health  Best Call Back Number:   Additional Information: requesting  call in regards to patient just came out of the ED and would lik to know when is the next to check her INR.

## 2020-12-04 NOTE — DISCHARGE SUMMARY
[facility] Medicine  Discharge Summary      Patient Name: Griselda Neely  MRN: [id number]  Admission Date: 12/1/2020  Hospital Length of Stay: 2 days  Discharge Date and Time: 12/3/2020 10:04 AM  Attending Physician: No att. providers found   Discharging Provider: Maris Chamorro DO  Primary Care Provider: Kalie Neely MD      HPI per Leyda Salazar on 12/1/2020  75-year-old female well known to hospitalist service presents emergency room with shortness of breath.  This patient has a noted history of atrial fibrillation, end-stage renal disease requiring hemodialysis 3 times a week CHF hypertension calciphylaxis of both legs anemia of chronic disease and long-term anticoagulation use for atrial thrombus.  The patient states her last hemodialysis was yesterday but she has persistent shortness of breath and decided to come to the emergency room for further evaluation.  The patient states she dialyzed for her full time yesterday without complications.  The patient has a known history of a GI bleed and was told that she is scheduled GI appointment for endoscopy.  She denies fever, chills, cough, hematemesis hemoptysis nausea vomiting lightheadedness dizziness she describes her symptoms as moderate in severity with no exacerbation alleviation.  She does complain of profound weakness.    Procedure(s) (LRB):  ENTEROSCOPY (N/A)      Hospital Course:   Patient was admitted for acute blood loss anemia secondary acute GI bleed.  Patient was transfused 2 units PRBC and underwent dialysis x 2 with UF.  She had improvement of hemoglobin from 6.3-7.7.  Gastroenterology was consulted and patient underwent small bowel enteroscopy. 2 bleeding angiodysplastic lesions were noted in the stomach and treated with a CP and clips.  Patient returned to floor and was monitored overnight.  She is recommended to follow GI as an outpatient for capsule study.  Patient was seen and examined on day of discharge, she had  "Returned to baseline function.  Son present at bedside and plan of care discussed. Home health resumed.     Physical exam on day of discharge:   BP (!) 176/75   Pulse (!) 59   Temp 97.7 °F (36.5 °C) (Oral)   Resp 16   Ht 5' 5" (1.651 m)   Wt 60.1 kg (132 lb 7.9 oz)   LMP  (LMP Unknown)   SpO2 98%   Breastfeeding No   BMI 22.05 kg/m²     Gen: nad, laying in bed  CV: rrr no mrg  Resp: CTA, 2LNC (home levels)  AB: +bs soft ntnd  Skin: dry, warm      Consults:   Consults (From admission, onward)        Status Ordering Provider     Inpatient consult to Gastroenterology  Once     Provider:  Sudheer Brown III, MD    Completed HARSHAL NAVARRETE          No new Assessment & Plan notes have been filed under this hospital service since the last note was generated.  Service: Hospital Medicine    Final Active Diagnoses:    Diagnosis Date Noted POA    PRINCIPAL PROBLEM:  Acute blood loss anemia [D62] 10/01/2020 Yes    Chronic kidney disease-mineral and bone disorder [N18.9, E83.9, M89.9] 12/02/2020 Yes    Anemia [D64.9] 03/17/2020 Yes     Chronic    Chronic systolic heart failure [I50.22] 02/03/2020 Yes    ESRD (end stage renal disease) on HD M,W, F [N18.6] 05/11/2016 Yes     Chronic    Gastroesophageal reflux disease [K21.9] 02/02/2016 Yes    HTN (hypertension) [I10] 08/15/2013 Yes     Chronic      Problems Resolved During this Admission:       Discharged Condition: stable    Disposition: Home-Health Care [facility]    Follow Up:   Contact information for follow-up providers     Kalie Neely MD.    Specialty: Family Medicine  Contact information:  [address]  [address]  [place] [address]  [phone]             Sudheer Brown III, MD. Schedule an appointment as soon as possible for a visit today.    Specialty: Gastroenterology  Contact information:  [address] [address] [facility] [address] [phone]             Katharina Powell MD. Schedule an appointment as soon as possible for a visit " today.    Specialties: INTERVENTIONAL CARDIOLOGY, Cardiology, Cardiovascular Disease  Why: hospital follow up  Contact information:  [address]  [address]  [place] [facility] [place] [address]  [phone]             Joseph Ingram MD.    Specialty: Nephrology  Why: go to routine HD  Contact information:  [address]  [place] [address]  [phone]                   Contact information for after-discharge care     Home Medical Care     [facility] .    Service: Home Health Services  Contact information:  [address], [address]  [facility] [id number] [phone]                           Patient Instructions:      Diet renal     Notify your health care provider if you experience any of the following:  temperature >100.4     Notify your health care provider if you experience any of the following:  persistent nausea and vomiting or diarrhea     Notify your health care provider if you experience any of the following:  difficulty breathing or increased cough     Notify your health care provider if you experience any of the following:  severe persistent headache     Notify your health care provider if you experience any of the following:  persistent dizziness, light-headedness, or visual disturbances     HOME HEALTH ORDERS   Order Comments: Subsequent Home Health Orders    Current Medications:  Current Facility-Administered Medications:  0.9%  NaCl infusion (for blood administration), , Intravenous, Q24H PRN, Chery Salazar NP  0.9%  NaCl infusion, , Intravenous, PRN, Joseph Ingram MD  calcium acetate(phosphat bind) capsule 667 mg, 667 mg, Oral, Daily, Chery Salazar NP, 667 mg at 12/03/20 0819  chlorhexidine 0.12 % solution 15 mL, 15 mL, Mouth/Throat, BID, Lizett Mann PharmD, 15 mL at 12/03/20 0820  diltiaZEM 24 hr capsule 240 mg, 240 mg, Oral, Daily, Chery Salazar NP, 240 mg at 12/03/20 0820  diphenhydrAMINE injection 25 mg, 25 mg, Intravenous, Q6H PRN, Joseph Ingram MD, 25  mg at 12/02/20 1330  epoetin alfonzo-epbx injection 6,000 Units, 100 Units/kg, Subcutaneous, Every Mon, Wed, Fri, Joseph Ingram MD, 6,000 Units at 12/02/20 1325  hydrALAZINE tablet 25 mg, 25 mg, Oral, Q8H PRN, Maris Chamorro DO  HYDROmorphone injection 1 mg, 1 mg, Intravenous, Q6H PRN, Chery Salazar NP  isosorbide mononitrate 24 hr tablet 30 mg, 30 mg, Oral, Daily, Chery Salazar NP, 30 mg at 12/03/20 0820  levalbuterol nebulizer solution 0.63 mg, 1 ampule, Nebulization, Q8H PRN, Chery Salazar NP  losartan tablet 25 mg, 25 mg, Oral, Daily, Chery Salazar NP, 25 mg at 12/03/20 0820  magnesium oxide tablet 400 mg, 400 mg, Oral, Daily, Chery Salazar NP, 400 mg at 12/03/20 0820  melatonin tablet 6 mg, 6 mg, Oral, Nightly PRN, Chery Salazar NP  metoprolol injection 5 mg, 5 mg, Intravenous, Q5 Min PRN, Maris Chamorro DO  metoprolol succinate (TOPROL-XL) 24 hr tablet 25 mg, 25 mg, Oral, Nightly, Chery Salazar NP, 25 mg at 12/02/20 2045  mupirocin 2 % ointment, , Nasal, BID, Joseph Ingram MD  nicotine 14 mg/24 hr 1 patch, 1 patch, Transdermal, Daily, Maris Chamorro DO, 1 patch at 12/03/20 0821  ondansetron disintegrating tablet 4 mg, 4 mg, Oral, Q6H PRN, Chery Salazar NP  oxyCODONE-acetaminophen 5-325 mg per tablet 1 tablet, 1 tablet, Oral, Q6H PRN, Chery Salazar NP, 1 tablet at 12/01/20 1516  pantoprazole EC tablet 40 mg, 40 mg, Oral, Daily, Maris Chamorro DO, 40 mg at 12/03/20 0820  simethicone 40 mg/0.6 mL oral syringe, , , PRN, Sudheer Brown III, MD, 20 mg at 12/02/20 0831  sodium chloride 0.9% flush 10 mL, 10 mL, Intravenous, PRN, Chery Salazar NP  sodium thiosulfate 12.5 gram/50 mL (250 mg/mL) injection 25 g, 25 g, Intravenous, Every Mon, Wed, Fri, Joseph Ingram MD, Last Rate: 600 mL/hr at 12/02/20 1652, 25 g at 12/02/20 1652  traMADoL tablet 50 mg, 50 mg, Oral, Three Times Weekly, Chery Salazar NP, 50 mg at 12/01/20 1104        Nursing:   N/A    Diet:   renal diet    Activities:   activity as  tolerated    Labs:  Report Lab results to PCP.    Referrals/ Consults  Physical Therapy to evaluate and treat. Evaluate for home safety and equipment needs; Establish/upgrade home exercise program. Perform / instruct on therapeutic exercises, gait training, transfer training, and Range of Motion.  Occupational Therapy to evaluate and treat. Evaluate home environment for safety and equipment needs. Perform/Instruct on transfers, ADL training, ROM, and therapeutic exercises.    Home Health Aide:  Physical Therapy Other: resume prior orders , Occupational Therapy Other: resume prior orders and Home Health Aide Other: resume prior orders     Order Specific Question Answer Comments   What Home Health Agency is the patient currently using? Other/External      Activity as tolerated       Significant Diagnostic Studies: Labs: All labs within the past 24 hours have been reviewed    Pending Diagnostic Studies:     None         Medications:  Reconciled Home Medications:      Medication List      START taking these medications    nicotine 14 mg/24 hr  Commonly known as: NICODERM CQ  Place 1 patch onto the skin once daily.        CONTINUE taking these medications    calcium acetate(phosphat bind) 667 mg capsule  Commonly known as: PHOSLO  Take 667 mg by mouth once daily.     diltiaZEM 240 MG Cdcr  Commonly known as: DILACOR XR  Take 1 capsule (240 mg total) by mouth once daily.     isosorbide mononitrate 30 MG 24 hr tablet  Commonly known as: IMDUR  Take 30 mg by mouth once daily.     levalbuterol 0.63 mg/3 mL nebulizer solution  Commonly known as: XOPENEX  Take 3 mLs (0.63 mg total) by nebulization every 8 (eight) hours as needed for Wheezing or Shortness of Breath. Rescue     losartan 25 MG tablet  Commonly known as: COZAAR  Take 25 mg by mouth once daily.     magnesium oxide 400 mg (241.3 mg magnesium) tablet  Commonly known as: MAG-OX  Take 1 tablet by mouth once daily     metoprolol succinate 25 MG 24 hr tablet  Commonly  known as: TOPROL-XL  Take 1 tablet (25 mg total) by mouth nightly.     ondansetron 4 MG Tbdl  Commonly known as: ZOFRAN-ODT  Take 4 mg by mouth every 6 (six) hours as needed.     oxyCODONE-acetaminophen 5-325 mg per tablet  Commonly known as: PERCOCET  Take 1 tablet by mouth every 6 (six) hours as needed for Pain.     pantoprazole 40 MG tablet  Commonly known as: PROTONIX  Take 1 tablet (40 mg total) by mouth once daily.     JENNIFER-KODAK ORAL  Take 1 tablet by mouth once daily.     traMADoL 50 mg tablet  Commonly known as: ULTRAM  Take 50 mg by mouth 3 (three) times a week.     wheelchair An  1 Device by Misc.(Non-Drug; Combo Route) route as needed (needed for transport).     zinc sulfate 220 mg Tab tablet  Take 220 mg by mouth every other day.        STOP taking these medications    aspirin 81 MG EC tablet  Commonly known as: ECOTRIN     warfarin 2 MG tablet  Commonly known as: COUMADIN            Indwelling Lines/Drains at time of discharge:   Lines/Drains/Airways     Drain                 Hemodialysis AV Fistula 08/08/19 0214 Right upper arm 484 days                Time spent on the discharge of patient: 32 minutes  Patient was seen and examined on the date of discharge and determined to be suitable for discharge.         Maris Chamorro DO  Department of Hospital Medicine  [facility]

## 2020-12-06 LAB
BACTERIA BLD CULT: NORMAL
BACTERIA BLD CULT: NORMAL

## 2020-12-07 ENCOUNTER — DOCUMENT SCAN (OUTPATIENT)
Dept: HOME HEALTH SERVICES | Facility: HOSPITAL | Age: 75
End: 2020-12-07
Payer: MEDICARE

## 2020-12-07 ENCOUNTER — DOCUMENT SCAN (OUTPATIENT)
Dept: HOME HEALTH SERVICES | Facility: HOSPITAL | Age: 75
End: 2020-12-07

## 2020-12-07 ENCOUNTER — TELEPHONE (OUTPATIENT)
Dept: CARDIOLOGY | Facility: CLINIC | Age: 75
End: 2020-12-07

## 2020-12-07 NOTE — TELEPHONE ENCOUNTER
Lindsay from [facility] calling to report patient has INR of 1.1.  Patient has been holding her Coumadin since her last  hospital stay (12/4/2020) for a GI bleed.     Her previous dose was 2.5mg daily.    Her range is 1.5-2 due to recurrent bleeding.    Please advise if patient can resume Coumadin.    Return call to Lindsay.    [phone]

## 2020-12-08 NOTE — TELEPHONE ENCOUNTER
Recommendations given to JUAN,  nurse.    Per  nurse, GI said patient can resume Coumadin.  Just need to verify dosage to restart.    Patient is not bleeding at this time.    If she takes 5mg, will start bleeding again.  Was previously taking 2.5mg daily.    Please advise.

## 2020-12-09 ENCOUNTER — DOCUMENT SCAN (OUTPATIENT)
Dept: HOME HEALTH SERVICES | Facility: HOSPITAL | Age: 75
End: 2020-12-09

## 2020-12-10 ENCOUNTER — DOCUMENT SCAN (OUTPATIENT)
Dept: HOME HEALTH SERVICES | Facility: HOSPITAL | Age: 75
End: 2020-12-10
Payer: MEDICARE

## 2020-12-10 ENCOUNTER — TELEPHONE (OUTPATIENT)
Dept: CARDIOLOGY | Facility: CLINIC | Age: 75
End: 2020-12-10

## 2020-12-10 DIAGNOSIS — J96.11 CHRONIC RESPIRATORY FAILURE WITH HYPOXIA: Primary | ICD-10-CM

## 2020-12-10 RX ORDER — METOPROLOL SUCCINATE 25 MG/1
TABLET, EXTENDED RELEASE ORAL
Qty: 30 TABLET | Refills: 2 | Status: SHIPPED | OUTPATIENT
Start: 2020-12-10 | End: 2021-01-01 | Stop reason: SDUPTHER

## 2020-12-10 RX ORDER — ALBUTEROL SULFATE 2.5 MG/.5ML
2.5 SOLUTION RESPIRATORY (INHALATION) EVERY 4 HOURS PRN
Qty: 20 EACH | Refills: 1 | Status: ON HOLD | OUTPATIENT
Start: 2020-12-10 | End: 2021-01-01 | Stop reason: HOSPADM

## 2020-12-10 NOTE — TELEPHONE ENCOUNTER
----- Message from Margarette Briscoe sent at 12/10/2020  9:13 AM CST -----  Pt's sister Elvi calling in refill on Albuterol for the nebulizer to [facility] in [place]   Cb # [phone]

## 2020-12-10 NOTE — TELEPHONE ENCOUNTER
----- Message from Rose Whitfield sent at 12/10/2020  9:40 AM CST -----  Regarding: refill  Metoprolol succinate 25mg Adelina Kelley

## 2020-12-12 ENCOUNTER — HOSPITAL ENCOUNTER (INPATIENT)
Facility: HOSPITAL | Age: 75
LOS: 2 days | Discharge: HOME | DRG: 291 | End: 2020-12-14
Attending: EMERGENCY MEDICINE | Admitting: HOSPITALIST
Payer: MEDICARE

## 2020-12-12 DIAGNOSIS — R06.02 SOB (SHORTNESS OF BREATH): ICD-10-CM

## 2020-12-12 DIAGNOSIS — I50.9 CONGESTIVE HEART FAILURE, UNSPECIFIED HF CHRONICITY, UNSPECIFIED HEART FAILURE TYPE: Primary | ICD-10-CM

## 2020-12-12 DIAGNOSIS — I10 UNCONTROLLED HYPERTENSION: ICD-10-CM

## 2020-12-12 DIAGNOSIS — I50.9 CHF (CONGESTIVE HEART FAILURE): ICD-10-CM

## 2020-12-12 DIAGNOSIS — I16.1 HYPERTENSIVE EMERGENCY: ICD-10-CM

## 2020-12-12 LAB
ALBUMIN SERPL BCP-MCNC: 3.6 G/DL (ref 3.5–5.2)
ALP SERPL-CCNC: 135 U/L (ref 55–135)
ALT SERPL W/O P-5'-P-CCNC: 12 U/L (ref 10–44)
ANION GAP SERPL CALC-SCNC: 15 MMOL/L (ref 8–16)
AST SERPL-CCNC: 23 U/L (ref 10–40)
BASOPHILS # BLD AUTO: 0.02 K/UL (ref 0–0.2)
BASOPHILS NFR BLD: 0.3 % (ref 0–1.9)
BILIRUB SERPL-MCNC: 1.1 MG/DL (ref 0.1–1)
BNP SERPL-MCNC: >4500 PG/ML (ref 0–99)
BUN SERPL-MCNC: 45 MG/DL (ref 8–23)
CALCIUM SERPL-MCNC: 8 MG/DL (ref 8.7–10.5)
CHLORIDE SERPL-SCNC: 101 MMOL/L (ref 95–110)
CO2 SERPL-SCNC: 24 MMOL/L (ref 23–29)
CREAT SERPL-MCNC: 6.8 MG/DL (ref 0.5–1.4)
DIFFERENTIAL METHOD: ABNORMAL
EOSINOPHIL # BLD AUTO: 0.1 K/UL (ref 0–0.5)
EOSINOPHIL NFR BLD: 2.3 % (ref 0–8)
ERYTHROCYTE [DISTWIDTH] IN BLOOD BY AUTOMATED COUNT: 17.8 % (ref 11.5–14.5)
EST. GFR  (AFRICAN AMERICAN): 6.3 ML/MIN/1.73 M^2
EST. GFR  (NON AFRICAN AMERICAN): 5.5 ML/MIN/1.73 M^2
GLUCOSE SERPL-MCNC: 105 MG/DL (ref 70–110)
HCT VFR BLD AUTO: 35.2 % (ref 37–48.5)
HGB BLD-MCNC: 10.2 G/DL (ref 12–16)
IMM GRANULOCYTES # BLD AUTO: 0.03 K/UL (ref 0–0.04)
IMM GRANULOCYTES NFR BLD AUTO: 0.5 % (ref 0–0.5)
INR PPP: 1.1
LYMPHOCYTES # BLD AUTO: 1.3 K/UL (ref 1–4.8)
LYMPHOCYTES NFR BLD: 22 % (ref 18–48)
MCH RBC QN AUTO: 26.9 PG (ref 27–31)
MCHC RBC AUTO-ENTMCNC: 29 G/DL (ref 32–36)
MCV RBC AUTO: 93 FL (ref 82–98)
MONOCYTES # BLD AUTO: 0.4 K/UL (ref 0.3–1)
MONOCYTES NFR BLD: 7.1 % (ref 4–15)
NEUTROPHILS # BLD AUTO: 3.9 K/UL (ref 1.8–7.7)
NEUTROPHILS NFR BLD: 67.8 % (ref 38–73)
NRBC BLD-RTO: 0 /100 WBC
PLATELET # BLD AUTO: 194 K/UL (ref 150–350)
PMV BLD AUTO: 10.9 FL (ref 9.2–12.9)
POTASSIUM SERPL-SCNC: 3.9 MMOL/L (ref 3.5–5.1)
PROT SERPL-MCNC: 9.5 G/DL (ref 6–8.4)
PROTHROMBIN TIME: 13.2 SEC (ref 10.6–14.8)
RBC # BLD AUTO: 3.79 M/UL (ref 4–5.4)
SARS-COV-2 RDRP RESP QL NAA+PROBE: NEGATIVE
SODIUM SERPL-SCNC: 140 MMOL/L (ref 136–145)
TROPONIN I SERPL DL<=0.01 NG/ML-MCNC: 0.03 NG/ML
TROPONIN I SERPL DL<=0.01 NG/ML-MCNC: 0.06 NG/ML
WBC # BLD AUTO: 5.77 K/UL (ref 3.9–12.7)

## 2020-12-12 PROCEDURE — 99291 CRITICAL CARE FIRST HOUR: CPT

## 2020-12-12 PROCEDURE — 27000221 HC OXYGEN, UP TO 24 HOURS

## 2020-12-12 PROCEDURE — 25000242 PHARM REV CODE 250 ALT 637 W/ HCPCS: Performed by: EMERGENCY MEDICINE

## 2020-12-12 PROCEDURE — 83880 ASSAY OF NATRIURETIC PEPTIDE: CPT

## 2020-12-12 PROCEDURE — 94640 AIRWAY INHALATION TREATMENT: CPT

## 2020-12-12 PROCEDURE — 85025 COMPLETE CBC W/AUTO DIFF WBC: CPT

## 2020-12-12 PROCEDURE — 93005 ELECTROCARDIOGRAM TRACING: CPT | Performed by: INTERNAL MEDICINE

## 2020-12-12 PROCEDURE — 84484 ASSAY OF TROPONIN QUANT: CPT | Mod: 91

## 2020-12-12 PROCEDURE — 94761 N-INVAS EAR/PLS OXIMETRY MLT: CPT

## 2020-12-12 PROCEDURE — 84484 ASSAY OF TROPONIN QUANT: CPT

## 2020-12-12 PROCEDURE — 80053 COMPREHEN METABOLIC PANEL: CPT

## 2020-12-12 PROCEDURE — 21000000 HC CCU ICU ROOM CHARGE

## 2020-12-12 PROCEDURE — 25000003 PHARM REV CODE 250: Performed by: EMERGENCY MEDICINE

## 2020-12-12 PROCEDURE — 99900035 HC TECH TIME PER 15 MIN (STAT)

## 2020-12-12 PROCEDURE — 90935 HEMODIALYSIS ONE EVALUATION: CPT

## 2020-12-12 PROCEDURE — 30000890 LABCORP MISCELLANEOUS TEST

## 2020-12-12 PROCEDURE — 25000003 PHARM REV CODE 250: Performed by: HOSPITALIST

## 2020-12-12 PROCEDURE — U0002 COVID-19 LAB TEST NON-CDC: HCPCS

## 2020-12-12 PROCEDURE — 93010 EKG 12-LEAD: ICD-10-PCS | Mod: ,,, | Performed by: INTERNAL MEDICINE

## 2020-12-12 PROCEDURE — 99900031 HC PATIENT EDUCATION (STAT)

## 2020-12-12 PROCEDURE — 85610 PROTHROMBIN TIME: CPT

## 2020-12-12 PROCEDURE — 83036 HEMOGLOBIN GLYCOSYLATED A1C: CPT

## 2020-12-12 PROCEDURE — 94644 CONT INHLJ TX 1ST HOUR: CPT

## 2020-12-12 PROCEDURE — 96374 THER/PROPH/DIAG INJ IV PUSH: CPT

## 2020-12-12 PROCEDURE — 93010 ELECTROCARDIOGRAM REPORT: CPT | Mod: ,,, | Performed by: INTERNAL MEDICINE

## 2020-12-12 PROCEDURE — 94660 CPAP INITIATION&MGMT: CPT

## 2020-12-12 PROCEDURE — 36415 COLL VENOUS BLD VENIPUNCTURE: CPT

## 2020-12-12 RX ORDER — TRAMADOL HYDROCHLORIDE 50 MG/1
50 TABLET ORAL
Status: DISCONTINUED | OUTPATIENT
Start: 2020-12-14 | End: 2020-12-14 | Stop reason: HOSPADM

## 2020-12-12 RX ORDER — LOSARTAN POTASSIUM 25 MG/1
25 TABLET ORAL DAILY
Status: DISCONTINUED | OUTPATIENT
Start: 2020-12-13 | End: 2020-12-14 | Stop reason: HOSPADM

## 2020-12-12 RX ORDER — HYDRALAZINE HYDROCHLORIDE 25 MG/1
25 TABLET, FILM COATED ORAL EVERY 8 HOURS
Status: DISCONTINUED | OUTPATIENT
Start: 2020-12-12 | End: 2020-12-14

## 2020-12-12 RX ORDER — ZINC SULFATE 50(220)MG
220 CAPSULE ORAL DAILY
Status: DISCONTINUED | OUTPATIENT
Start: 2020-12-13 | End: 2020-12-14 | Stop reason: HOSPADM

## 2020-12-12 RX ORDER — CLONIDINE 0.2 MG/24H
1 PATCH, EXTENDED RELEASE TRANSDERMAL
Status: DISCONTINUED | OUTPATIENT
Start: 2020-12-12 | End: 2020-12-12

## 2020-12-12 RX ORDER — ONDANSETRON 2 MG/ML
4 INJECTION INTRAMUSCULAR; INTRAVENOUS EVERY 6 HOURS PRN
Status: DISCONTINUED | OUTPATIENT
Start: 2020-12-12 | End: 2020-12-14 | Stop reason: HOSPADM

## 2020-12-12 RX ORDER — ISOSORBIDE MONONITRATE 30 MG/1
30 TABLET, EXTENDED RELEASE ORAL DAILY
Status: DISCONTINUED | OUTPATIENT
Start: 2020-12-13 | End: 2020-12-14 | Stop reason: HOSPADM

## 2020-12-12 RX ORDER — CLONIDINE HYDROCHLORIDE 0.1 MG/1
0.2 TABLET ORAL ONCE
Status: COMPLETED | OUTPATIENT
Start: 2020-12-12 | End: 2020-12-12

## 2020-12-12 RX ORDER — SODIUM CHLORIDE 9 MG/ML
INJECTION, SOLUTION INTRAVENOUS ONCE
Status: CANCELLED | OUTPATIENT
Start: 2020-12-12 | End: 2020-12-12

## 2020-12-12 RX ORDER — SODIUM CHLORIDE 9 MG/ML
INJECTION, SOLUTION INTRAVENOUS
Status: CANCELLED | OUTPATIENT
Start: 2020-12-12

## 2020-12-12 RX ORDER — LEVALBUTEROL INHALATION SOLUTION 0.63 MG/3ML
1 SOLUTION RESPIRATORY (INHALATION) EVERY 8 HOURS PRN
Status: DISCONTINUED | OUTPATIENT
Start: 2020-12-12 | End: 2020-12-14 | Stop reason: HOSPADM

## 2020-12-12 RX ORDER — LABETALOL HYDROCHLORIDE 5 MG/ML
10 INJECTION, SOLUTION INTRAVENOUS
Status: COMPLETED | OUTPATIENT
Start: 2020-12-12 | End: 2020-12-12

## 2020-12-12 RX ORDER — ENOXAPARIN SODIUM 100 MG/ML
30 INJECTION SUBCUTANEOUS EVERY 24 HOURS
Status: DISCONTINUED | OUTPATIENT
Start: 2020-12-12 | End: 2020-12-14 | Stop reason: HOSPADM

## 2020-12-12 RX ORDER — IPRATROPIUM BROMIDE AND ALBUTEROL SULFATE 2.5; .5 MG/3ML; MG/3ML
9 SOLUTION RESPIRATORY (INHALATION)
Status: COMPLETED | OUTPATIENT
Start: 2020-12-12 | End: 2020-12-12

## 2020-12-12 RX ORDER — DILTIAZEM HYDROCHLORIDE 120 MG/1
240 CAPSULE, COATED, EXTENDED RELEASE ORAL DAILY
Status: DISCONTINUED | OUTPATIENT
Start: 2020-12-13 | End: 2020-12-14 | Stop reason: HOSPADM

## 2020-12-12 RX ORDER — LANOLIN ALCOHOL/MO/W.PET/CERES
400 CREAM (GRAM) TOPICAL DAILY
Status: DISCONTINUED | OUTPATIENT
Start: 2020-12-13 | End: 2020-12-14 | Stop reason: HOSPADM

## 2020-12-12 RX ORDER — MUPIROCIN 20 MG/G
OINTMENT TOPICAL 2 TIMES DAILY
Status: CANCELLED | OUTPATIENT
Start: 2020-12-12 | End: 2020-12-17

## 2020-12-12 RX ORDER — OXYCODONE AND ACETAMINOPHEN 5; 325 MG/1; MG/1
1 TABLET ORAL EVERY 6 HOURS PRN
Status: DISCONTINUED | OUTPATIENT
Start: 2020-12-12 | End: 2020-12-14 | Stop reason: HOSPADM

## 2020-12-12 RX ORDER — ACETAMINOPHEN 325 MG/1
650 TABLET ORAL EVERY 6 HOURS PRN
Status: DISCONTINUED | OUTPATIENT
Start: 2020-12-12 | End: 2020-12-14 | Stop reason: HOSPADM

## 2020-12-12 RX ORDER — IBUPROFEN 200 MG
1 TABLET ORAL DAILY
Status: DISCONTINUED | OUTPATIENT
Start: 2020-12-13 | End: 2020-12-14 | Stop reason: HOSPADM

## 2020-12-12 RX ORDER — CALCIUM ACETATE 667 MG/1
667 CAPSULE ORAL DAILY
Status: DISCONTINUED | OUTPATIENT
Start: 2020-12-13 | End: 2020-12-14 | Stop reason: HOSPADM

## 2020-12-12 RX ORDER — METOPROLOL SUCCINATE 25 MG/1
25 TABLET, EXTENDED RELEASE ORAL NIGHTLY
Status: DISCONTINUED | OUTPATIENT
Start: 2020-12-12 | End: 2020-12-14 | Stop reason: HOSPADM

## 2020-12-12 RX ADMIN — METOPROLOL SUCCINATE 25 MG: 25 TABLET, FILM COATED, EXTENDED RELEASE ORAL at 09:12

## 2020-12-12 RX ADMIN — CLONIDINE HYDROCHLORIDE 0.2 MG: 0.1 TABLET ORAL at 09:12

## 2020-12-12 RX ADMIN — IPRATROPIUM BROMIDE AND ALBUTEROL SULFATE 9 ML: .5; 3 SOLUTION RESPIRATORY (INHALATION) at 01:12

## 2020-12-12 RX ADMIN — HYDRALAZINE HYDROCHLORIDE 25 MG: 25 TABLET, FILM COATED ORAL at 10:12

## 2020-12-12 RX ADMIN — LABETALOL HYDROCHLORIDE 10 MG: 5 INJECTION INTRAVENOUS at 01:12

## 2020-12-12 NOTE — H&P
[facility] Medicine  History & Physical    Patient Name: Griselda Neely  MRN: [id number]  Admission Date: 12/12/2020  Attending Physician: Kenji Sanford Jr., *   Primary Care Provider: Kalie Neely MD         Patient information was obtained from patient and ER records.  And ED physician    Subjective:     Principal Problem:Congestive heart failure    Chief Complaint:   Chief Complaint   Patient presents with    Shortness of Breath     X 1 DAY    Cough        HPI: 75-year-old female well known to hospitalist service presents ED w SOB. Pattient has hx atrial fibrillation, atrial thrombus ESRD HD MWF,CHF EF48% HTN calciphylaxis of both legs anemia of chronic disease   Patient was recently hospitalized 12 /1 through 12/ 4 for acute blood loss anemia  which was found to be secondary to angiodysplastic lesions that required coagulation and clips being placed.  Patient was taken off her Coumadin at that time and has not restarted.  Patient states she last received dialysis yesterday and normal amount of fluid was taken off.  She returns to the emergency department today with complaints of of shortness of breath x1 day.  In the ED blood pressure was 247/114 and chest x-ray showed findings consistent with CHF.  Patient currently on BiPAP                            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    both legs    CHF (congestive heart failure)     Encounter for blood transfusion 03/2016    Gout     Hemodialysis access, AV graft     Hypertension     Mitral valve regurgitation     Osteoarthritis     Pancreatitis     Peripheral vascular disease     Pneumonia 09/09/2017    Renal failure        Past Surgical History:   Procedure Laterality Date    ACHILLES TENDON SURGERY Right     ANGIOGRAPHY OF ARTERIOVENOUS SHUNT Right 1/7/2020    Procedure: Fistulogram with Possible Intervention;  Surgeon: Joseph Loyola,  MD;  Location: [facility] CATH/EP LAB;  Service: Cardiology;  Laterality: Right;    ANGIOGRAPHY OF LOWER EXTREMITY Left 3/18/2020    Procedure: Angiogram Extremity Unilateral;  Surgeon: Ali Khoobehi, MD;  Location: [facility] CATH/EP LAB;  Service: Cardiology;  Laterality: Left;    APPENDECTOMY      CARDIAC CATHETERIZATION  07/03/2017    CHOLECYSTECTOMY      COLONOSCOPY Left 10/4/2020    Procedure: COLONOSCOPY;  Surgeon: Jodran Kilpatrick MD;  Location: [facility] ENDO;  Service: Endoscopy;  Laterality: Left;    ESOPHAGOGASTRODUODENOSCOPY Left 8/17/2020    Procedure: EGD (ESOPHAGOGASTRODUODENOSCOPY);  Surgeon: Talat Trevizo MD;  Location: [facility] ENDO;  Service: Endoscopy;  Laterality: Left;    ESOPHAGOGASTRODUODENOSCOPY Left 10/2/2020    Procedure: EGD (ESOPHAGOGASTRODUODENOSCOPY);  Surgeon: Talat Trevizo MD;  Location: [facility] ENDO;  Service: Endoscopy;  Laterality: Left;    heal surgery Right     HYSTERECTOMY      INSERTION OF STENT INTO PERIPHERAL VESSEL N/A 1/7/2020    Procedure: INSERTION, STENT, VESSEL, PERIPHERAL;  Surgeon: Joseph Loyola MD;  Location: [facility] CATH/EP LAB;  Service: Cardiology;  Laterality: N/A;    MITRAL VALVE REPLACEMENT      PARATHYROIDECTOMY      PARATHYROIDECTOMY  07/13/2017    PERCUTANEOUS TRANSLUMINAL ANGIOPLASTY OF ARTERIOVENOUS FISTULA N/A 1/7/2020    Procedure: PTA, AV FISTULA;  Surgeon: Joseph Loyola MD;  Location: [facility] CATH/EP LAB;  Service: Cardiology;  Laterality: N/A;    PERITONEAL CATHETER INSERTION      RENAL BIOPSY      SMALL BOWEL ENTEROSCOPY N/A 12/2/2020    Procedure: ENTEROSCOPY;  Surgeon: Sudheer Brown III, MD;  Location: [facility] ENDO;  Service: Endoscopy;  Laterality: N/A;    vocal cord nodule      WOUND DEBRIDEMENT Left 7/13/2020    Procedure: DEBRIDEMENT, WOUND;  Surgeon: Carlos Elam III, MD;  Location: [facility] OR;  Service: General;  Laterality: Left;       Review of patient's allergies indicates:  No Known Allergies    No current facility-administered  medications on file prior to encounter.      Current Outpatient Medications on File Prior to Encounter   Medication Sig    albuterol sulfate 2.5 mg/0.5 mL Nebu Take 2.5 mg by nebulization every 4 (four) hours as needed. Rescue    calcium acetate (PHOSLO) 667 mg capsule Take 667 mg by mouth once daily.     diltiaZEM (DILACOR XR) 240 MG CDCR Take 1 capsule (240 mg total) by mouth once daily.    folic acid/vit B complex and C (JENNIFER-KODAK ORAL) Take 1 tablet by mouth once daily.    isosorbide mononitrate (IMDUR) 30 MG 24 hr tablet Take 30 mg by mouth once daily.    levalbuterol (XOPENEX) 0.63 mg/3 mL nebulizer solution Take 3 mLs (0.63 mg total) by nebulization every 8 (eight) hours as needed for Wheezing or Shortness of Breath. Rescue    losartan (COZAAR) 25 MG tablet Take 25 mg by mouth once daily.    magnesium oxide (MAG-OX) 400 mg (241.3 mg magnesium) tablet Take 1 tablet by mouth once daily (Patient taking differently: Take 400 mg by mouth once daily. )    metoprolol succinate (TOPROL-XL) 25 MG 24 hr tablet Take 1 tablet by mouth nightly    nicotine (NICODERM CQ) 14 mg/24 hr Place 1 patch onto the skin once daily.    ondansetron (ZOFRAN-ODT) 4 MG TbDL Take 4 mg by mouth every 6 (six) hours as needed.    oxyCODONE-acetaminophen (PERCOCET) 5-325 mg per tablet Take 1 tablet by mouth every 6 (six) hours as needed for Pain.    traMADoL (ULTRAM) 50 mg tablet Take 50 mg by mouth 3 (three) times a week.    wheelchair An 1 Device by Misc.(Non-Drug; Combo Route) route as needed (needed for transport).    zinc sulfate 220 mg Tab tablet Take 220 mg by mouth every other day.    pantoprazole (PROTONIX) 40 MG tablet Take 1 tablet (40 mg total) by mouth once daily.     Family History     Problem Relation (Age of Onset)    Arthritis Mother    Diabetes Mother, Father    Early death Sister, Sister    Heart disease Sister, Maternal Grandfather, Mother    Heart failure Mother    Hypertension Mother        Tobacco Use     Smoking status: Current Some Day Smoker     Packs/day: 0.50     Years: 45.00     Pack years: 22.50     Types: Cigarettes    Smokeless tobacco: Never Used   Substance and Sexual Activity    Alcohol use: No    Drug use: No    Sexual activity: Not Currently     Review of Systems comprehensive review of systems are as per HPI otherwise noncontributory  Objective:     Vital Signs (Most Recent):  Temp: 98.2 °F (36.8 °C) (12/12/20 1312)  Pulse: 82 (12/12/20 1430)  Resp: (!) 22 (12/12/20 1430)  BP: (!) 182/85 (12/12/20 1430)  SpO2: 100 % (12/12/20 1430) Vital Signs (24h Range):  Temp:  [98.2 °F (36.8 °C)] 98.2 °F (36.8 °C)  Pulse:  [] 82  Resp:  [22-34] 22  SpO2:  [96 %-100 %] 100 %  BP: (180-247)/() 182/85     Weight: 59.9 kg (132 lb)  Body mass index is 21.97 kg/m².    Physical Exam patient appears in no extreme acute distress lying on a [facility] emergency department currently on BiPAP  HEENT sclerae nonicteric  Neck is supple nontender  Lungs coarse scattered rales throughout moderate air movement symmetrical expansion  Heart irregular irregular rate in the 90s  Abdomen is soft nontender  Extremities no edema  Neuro patient is alert oriented x3 motor exam is nonfocal   exam no Mauricio  Skin no rash  Neuro patient is alert oriented x3 motor exam is nonfocal  Psych patient is pleasant, cooperative          Significant Labs:   BMP:   Recent Labs   Lab 12/12/20  1327         K 3.9      CO2 24   BUN 45*   CREATININE 6.8*   CALCIUM 8.0*     CBC:   Recent Labs   Lab 12/12/20  1327   WBC 5.77   HGB 10.2*   HCT 35.2*        CMP:   Recent Labs   Lab 12/12/20  1327      K 3.9      CO2 24      BUN 45*   CREATININE 6.8*   CALCIUM 8.0*   PROT 9.5*   ALBUMIN 3.6   BILITOT 1.1*   ALKPHOS 135   AST 23   ALT 12   ANIONGAP 15   EGFRNONAA 5.5*     Lactic Acid: No results for input(s): LACTATE in the last 48 hours.  Magnesium: No results for input(s): MG in the last 48  hours.  Troponin:   Recent Labs   Lab 12/12/20  1327   TROPONINI 0.030     SARS-CoV-2 negative  BNP 4500    Significant Imaging:   IMPRESSION: No change. Moderate cardiomegaly. Chronic groundglass,  interstitial opacities, small right greater than left pleural  effusions.    Assessment/Plan:     #1 Acute hypertensive emergency-now improved  Will continue to monitor blood pressure, may require continuous IV medication infusion.  #2 Acute diastolic CHF rEF 48%-secondary to to #1.  ED has consulted Renal.  Patient will most likely need dialysis today  #3 Paroxysmal atrial fibrillation/hx atrial thrombus-patient currently off her Coumadin due to recent GI bleed (angiodysplastic lesions x2)  #4 ESRD HD as per renal   #5 Anemia chronic disease-follow H&H  #6 Hx caliphylaxis     Renal consult,, IV meds for blood pressure control.  Follow serial labs    VTE Risk Mitigation (From admission, onward)         Ordered     enoxaparin injection 30 mg  Every 24 hours      12/12/20 1532     IP VTE HIGH RISK PATIENT  Once      12/12/20 1532     Place sequential compression device  Until discontinued      12/12/20 1532                   Basil Velasquez DO  Department of Hospital Medicine   [facility]

## 2020-12-12 NOTE — SUBJECTIVE & OBJECTIVE
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    both legs    CHF (congestive heart failure)     Encounter for blood transfusion 03/2016    Gout     Hemodialysis access, AV graft     Hypertension     Mitral valve regurgitation     Osteoarthritis     Pancreatitis     Peripheral vascular disease     Pneumonia 09/09/2017    Renal failure        Past Surgical History:   Procedure Laterality Date    ACHILLES TENDON SURGERY Right     ANGIOGRAPHY OF ARTERIOVENOUS SHUNT Right 1/7/2020    Procedure: Fistulogram with Possible Intervention;  Surgeon: Joseph Loyola MD;  Location: [facility] CATH/EP LAB;  Service: Cardiology;  Laterality: Right;    ANGIOGRAPHY OF LOWER EXTREMITY Left 3/18/2020    Procedure: Angiogram Extremity Unilateral;  Surgeon: Ali Khoobehi, MD;  Location: [facility] CATH/EP LAB;  Service: Cardiology;  Laterality: Left;    APPENDECTOMY      CARDIAC CATHETERIZATION  07/03/2017    CHOLECYSTECTOMY      COLONOSCOPY Left 10/4/2020    Procedure: COLONOSCOPY;  Surgeon: Jordan Kilpatrick MD;  Location: [facility] ENDO;  Service: Endoscopy;  Laterality: Left;    ESOPHAGOGASTRODUODENOSCOPY Left 8/17/2020    Procedure: EGD (ESOPHAGOGASTRODUODENOSCOPY);  Surgeon: Talat Trevizo MD;  Location: [facility] ENDO;  Service: Endoscopy;  Laterality: Left;    ESOPHAGOGASTRODUODENOSCOPY Left 10/2/2020    Procedure: EGD (ESOPHAGOGASTRODUODENOSCOPY);  Surgeon: Talat Trevizo MD;  Location: [facility] ENDO;  Service: Endoscopy;  Laterality: Left;    heal surgery Right     HYSTERECTOMY      INSERTION OF STENT INTO PERIPHERAL VESSEL N/A 1/7/2020    Procedure: INSERTION, STENT, VESSEL, PERIPHERAL;  Surgeon: Joseph Loyola MD;  Location: [facility] CATH/EP LAB;  Service: Cardiology;  Laterality: N/A;    MITRAL VALVE REPLACEMENT      PARATHYROIDECTOMY      PARATHYROIDECTOMY  07/13/2017    PERCUTANEOUS TRANSLUMINAL ANGIOPLASTY OF ARTERIOVENOUS FISTULA N/A 1/7/2020    Procedure:  PTA, AV FISTULA;  Surgeon: Joseph Loyola MD;  Location: [facility] CATH/EP LAB;  Service: Cardiology;  Laterality: N/A;    PERITONEAL CATHETER INSERTION      RENAL BIOPSY      SMALL BOWEL ENTEROSCOPY N/A 12/2/2020    Procedure: ENTEROSCOPY;  Surgeon: Sudheer Brown III, MD;  Location: [facility] ENDO;  Service: Endoscopy;  Laterality: N/A;    vocal cord nodule      WOUND DEBRIDEMENT Left 7/13/2020    Procedure: DEBRIDEMENT, WOUND;  Surgeon: Carlos Elam III, MD;  Location: [facility] OR;  Service: General;  Laterality: Left;       Review of patient's allergies indicates:  No Known Allergies    No current facility-administered medications on file prior to encounter.      Current Outpatient Medications on File Prior to Encounter   Medication Sig    albuterol sulfate 2.5 mg/0.5 mL Nebu Take 2.5 mg by nebulization every 4 (four) hours as needed. Rescue    calcium acetate (PHOSLO) 667 mg capsule Take 667 mg by mouth once daily.     diltiaZEM (DILACOR XR) 240 MG CDCR Take 1 capsule (240 mg total) by mouth once daily.    folic acid/vit B complex and C (JENNIFER-KODAK ORAL) Take 1 tablet by mouth once daily.    isosorbide mononitrate (IMDUR) 30 MG 24 hr tablet Take 30 mg by mouth once daily.    levalbuterol (XOPENEX) 0.63 mg/3 mL nebulizer solution Take 3 mLs (0.63 mg total) by nebulization every 8 (eight) hours as needed for Wheezing or Shortness of Breath. Rescue    losartan (COZAAR) 25 MG tablet Take 25 mg by mouth once daily.    magnesium oxide (MAG-OX) 400 mg (241.3 mg magnesium) tablet Take 1 tablet by mouth once daily (Patient taking differently: Take 400 mg by mouth once daily. )    metoprolol succinate (TOPROL-XL) 25 MG 24 hr tablet Take 1 tablet by mouth nightly    nicotine (NICODERM CQ) 14 mg/24 hr Place 1 patch onto the skin once daily.    ondansetron (ZOFRAN-ODT) 4 MG TbDL Take 4 mg by mouth every 6 (six) hours as needed.    oxyCODONE-acetaminophen (PERCOCET) 5-325 mg per tablet Take 1 tablet by mouth  every 6 (six) hours as needed for Pain.    traMADoL (ULTRAM) 50 mg tablet Take 50 mg by mouth 3 (three) times a week.    wheelchair An 1 Device by Misc.(Non-Drug; Combo Route) route as needed (needed for transport).    zinc sulfate 220 mg Tab tablet Take 220 mg by mouth every other day.    pantoprazole (PROTONIX) 40 MG tablet Take 1 tablet (40 mg total) by mouth once daily.     Family History     Problem Relation (Age of Onset)    Arthritis Mother    Diabetes Mother, Father    Early death Sister, Sister    Heart disease Sister, Maternal Grandfather, Mother    Heart failure Mother    Hypertension Mother        Tobacco Use    Smoking status: Current Some Day Smoker     Packs/day: 0.50     Years: 45.00     Pack years: 22.50     Types: Cigarettes    Smokeless tobacco: Never Used   Substance and Sexual Activity    Alcohol use: No    Drug use: No    Sexual activity: Not Currently     Review of Systems comprehensive review of systems are as per HPI otherwise noncontributory  Objective:     Vital Signs (Most Recent):  Temp: 98.2 °F (36.8 °C) (12/12/20 1312)  Pulse: 82 (12/12/20 1430)  Resp: (!) 22 (12/12/20 1430)  BP: (!) 182/85 (12/12/20 1430)  SpO2: 100 % (12/12/20 1430) Vital Signs (24h Range):  Temp:  [98.2 °F (36.8 °C)] 98.2 °F (36.8 °C)  Pulse:  [] 82  Resp:  [22-34] 22  SpO2:  [96 %-100 %] 100 %  BP: (180-247)/() 182/85     Weight: 59.9 kg (132 lb)  Body mass index is 21.97 kg/m².    Physical Exam patient appears in no extreme acute distress lying on a [facility] emergency department currently on BiPAP  HEENT sclerae nonicteric  Neck is supple nontender  Lungs coarse scattered rales throughout moderate air movement symmetrical expansion  Heart irregular irregular rate in the 90s  Abdomen is soft nontender  Extremities no edema  Neuro patient is alert oriented x3 motor exam is nonfocal   exam no Mauricio  Skin no rash  Neuro patient is alert oriented x3 motor exam is nonfocal  Psych patient is  naeem, cooperative          Significant Labs:   BMP:   Recent Labs   Lab 12/12/20  1327         K 3.9      CO2 24   BUN 45*   CREATININE 6.8*   CALCIUM 8.0*     CBC:   Recent Labs   Lab 12/12/20  1327   WBC 5.77   HGB 10.2*   HCT 35.2*        CMP:   Recent Labs   Lab 12/12/20  1327      K 3.9      CO2 24      BUN 45*   CREATININE 6.8*   CALCIUM 8.0*   PROT 9.5*   ALBUMIN 3.6   BILITOT 1.1*   ALKPHOS 135   AST 23   ALT 12   ANIONGAP 15   EGFRNONAA 5.5*     Lactic Acid: No results for input(s): LACTATE in the last 48 hours.  Magnesium: No results for input(s): MG in the last 48 hours.  Troponin:   Recent Labs   Lab 12/12/20  1327   TROPONINI 0.030     SARS-CoV-2 negative  BNP 4500    Significant Imaging:   IMPRESSION: No change. Moderate cardiomegaly. Chronic groundglass,  interstitial opacities, small right greater than left pleural  effusions.

## 2020-12-12 NOTE — ED PROVIDER NOTES
Encounter Date: 12/12/2020       History     Chief Complaint   Patient presents with    Shortness of Breath     X 1 DAY    Cough     75-year-old female who has a history of hypertension, chronic kidney disease on hemodialysis on Monday Wednesdays and Fridays, anemia, atrial fib, CHF, mitral regurg, pancreatitis, peripheral vascular disease, presents with complaints of having acute shortness of breath.  Symptoms have been present for 1 day.  No history any fever or cough.  No associated chest pain, nausea vomiting.  Patient admits being aneuric.  She admits having been dialyzed yesterday.  She does have some cough which is nonproductive.  No wheezing.  Patient states she continues to smoke.  She is normally on 4 L nasal cannula at home.  No complaints of any abdominal pain nausea vomiting.        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    both legs    CHF (congestive heart failure)     Encounter for blood transfusion 03/2016    Gout     Hemodialysis access, AV graft     Hypertension     Mitral valve regurgitation     Osteoarthritis     Pancreatitis     Peripheral vascular disease     Pneumonia 09/09/2017    Renal failure      Past Surgical History:   Procedure Laterality Date    ACHILLES TENDON SURGERY Right     ANGIOGRAPHY OF ARTERIOVENOUS SHUNT Right 1/7/2020    Procedure: Fistulogram with Possible Intervention;  Surgeon: Joseph Loyola MD;  Location: [facility] CATH/EP LAB;  Service: Cardiology;  Laterality: Right;    ANGIOGRAPHY OF LOWER EXTREMITY Left 3/18/2020    Procedure: Angiogram Extremity Unilateral;  Surgeon: Ali Khoobehi, MD;  Location: [facility] CATH/EP LAB;  Service: Cardiology;  Laterality: Left;    APPENDECTOMY      CARDIAC CATHETERIZATION  07/03/2017    CHOLECYSTECTOMY      COLONOSCOPY Left 10/4/2020    Procedure: COLONOSCOPY;  Surgeon: Jordan Kilpatrick MD;   Location: [facility] ENDO;  Service: Endoscopy;  Laterality: Left;    ESOPHAGOGASTRODUODENOSCOPY Left 8/17/2020    Procedure: EGD (ESOPHAGOGASTRODUODENOSCOPY);  Surgeon: Talat Trevizo MD;  Location: [facility] ENDO;  Service: Endoscopy;  Laterality: Left;    ESOPHAGOGASTRODUODENOSCOPY Left 10/2/2020    Procedure: EGD (ESOPHAGOGASTRODUODENOSCOPY);  Surgeon: Talat Trevizo MD;  Location: [facility] ENDO;  Service: Endoscopy;  Laterality: Left;    heal surgery Right     HYSTERECTOMY      INSERTION OF STENT INTO PERIPHERAL VESSEL N/A 1/7/2020    Procedure: INSERTION, STENT, VESSEL, PERIPHERAL;  Surgeon: Joseph Loyola MD;  Location: [facility] CATH/EP LAB;  Service: Cardiology;  Laterality: N/A;    MITRAL VALVE REPLACEMENT      PARATHYROIDECTOMY      PARATHYROIDECTOMY  07/13/2017    PERCUTANEOUS TRANSLUMINAL ANGIOPLASTY OF ARTERIOVENOUS FISTULA N/A 1/7/2020    Procedure: PTA, AV FISTULA;  Surgeon: Joseph Loyola MD;  Location: [facility] CATH/EP LAB;  Service: Cardiology;  Laterality: N/A;    PERITONEAL CATHETER INSERTION      RENAL BIOPSY      SMALL BOWEL ENTEROSCOPY N/A 12/2/2020    Procedure: ENTEROSCOPY;  Surgeon: Sudheer Brown III, MD;  Location: [facility] ENDO;  Service: Endoscopy;  Laterality: N/A;    vocal cord nodule      WOUND DEBRIDEMENT Left 7/13/2020    Procedure: DEBRIDEMENT, WOUND;  Surgeon: Carlos Elam III, MD;  Location: [facility] OR;  Service: General;  Laterality: Left;     Family History   Problem Relation Age of Onset    Heart disease Sister     Early death Sister         heart as baby    Heart disease Maternal Grandfather     Diabetes Mother     Hypertension Mother     Heart failure Mother     Heart disease Mother     Arthritis Mother     Diabetes Father     Early death Sister         infant     Social History     Tobacco Use    Smoking status: Current Some Day Smoker     Packs/day: 0.50     Years: 45.00     Pack years: 22.50     Types: Cigarettes    Smokeless tobacco: Never Used   Substance  Use Topics    Alcohol use: No    Drug use: No     Review of Systems   Constitutional: Positive for activity change. Negative for chills, diaphoresis and fever.   HENT: Negative for congestion, ear pain, mouth sores, rhinorrhea, sinus pressure, sinus pain, sore throat and trouble swallowing.    Respiratory: Positive for cough, shortness of breath and wheezing. Negative for chest tightness.    Cardiovascular: Negative for chest pain, palpitations and leg swelling.   Gastrointestinal: Negative for abdominal pain, diarrhea, nausea and vomiting.   Genitourinary: Negative.  Negative for difficulty urinating, dysuria, flank pain and frequency.        Aneuric   Musculoskeletal: Negative for back pain.   Skin: Negative for rash.   Neurological: Negative for dizziness, syncope, weakness, numbness and headaches.   Hematological: Does not bruise/bleed easily.   All other systems reviewed and are negative.      Physical Exam     Initial Vitals [12/12/20 1312]   BP Pulse Resp Temp SpO2   (!) 247/114 (!) 112 (!) 28 98.2 °F (36.8 °C) 96 %      MAP       --         Physical Exam    Vitals reviewed.  Constitutional: She appears well-developed and well-nourished. She is not diaphoretic. No distress.   HENT:   Head: Normocephalic and atraumatic.   Nose: Nose normal.   Mouth/Throat: Oropharynx is clear and moist. No oropharyngeal exudate.   Eyes: Conjunctivae are normal. Pupils are equal, round, and reactive to light. Right eye exhibits no discharge. Left eye exhibits no discharge.   Neck: Normal range of motion. Neck supple. No JVD present.   Cardiovascular: Normal rate, regular rhythm, normal heart sounds and intact distal pulses. Exam reveals no gallop and no friction rub.    No murmur heard.  Pulmonary/Chest: Breath sounds normal. She is in respiratory distress. She has no wheezes. She has no rhonchi. She has no rales.   Abdominal: Soft. Bowel sounds are normal. She exhibits no distension. There is no abdominal tenderness. There  is no rebound and no guarding.   Musculoskeletal: Normal range of motion. No tenderness or edema.   Lymphadenopathy:     She has no cervical adenopathy.   Neurological: She is alert and oriented to person, place, and time. She has normal strength. GCS score is 15. GCS eye subscore is 4. GCS verbal subscore is 5. GCS motor subscore is 6.   Skin: Skin is warm and dry. Capillary refill takes less than 2 seconds. No rash noted. No erythema. No pallor.   Psychiatric: She has a normal mood and affect. Her behavior is normal. Judgment and thought content normal.         ED Course   Critical Care    Date/Time: 12/12/2020 3:13 PM  Performed by: Kenji Sanford Jr., MD  Authorized by: Kenji Sanford Jr., MD   Direct patient critical care time: 20 minutes  Ordering / reviewing critical care time: 5 minutes  Total critical care time (exclusive of procedural time) : 25 minutes        Labs Reviewed   CBC W/ AUTO DIFFERENTIAL - Abnormal; Notable for the following components:       Result Value    RBC 3.79 (*)     Hemoglobin 10.2 (*)     Hematocrit 35.2 (*)     MCH 26.9 (*)     MCHC 29.0 (*)     RDW 17.8 (*)     All other components within normal limits   COMPREHENSIVE METABOLIC PANEL - Abnormal; Notable for the following components:    BUN 45 (*)     Creatinine 6.8 (*)     Calcium 8.0 (*)     Total Protein 9.5 (*)     Total Bilirubin 1.1 (*)     eGFR if  6.3 (*)     eGFR if non  5.5 (*)     All other components within normal limits   B-TYPE NATRIURETIC PEPTIDE - Abnormal; Notable for the following components:    BNP >4,500 (*)     All other components within normal limits   TROPONIN I   PROTIME-INR   SARS-COV-2 RNA AMPLIFICATION, QUAL        ECG Results          EKG 12-lead (In process)  Result time 12/12/20 14:55:45    In process by Interface, Lab In [facility] (12/12/20 14:55:45)                 Narrative:    Test Reason : R06.02,    Vent. Rate : 106 BPM     Atrial Rate : 106 BPM     P-R Int :  160 ms          QRS Dur : 086 ms      QT Int : 342 ms       P-R-T Axes : 062 -13 083 degrees     QTc Int : 454 ms    Sinus tachycardia with occasional Premature ventricular complexes  Septal infarct ,age undetermined  ST and T wave abnormality, consider lateral ischemia  Abnormal ECG  When compared with ECG of 01-DEC-2020 04:24,  Premature ventricular complexes are now Present  Septal infarct is now Present    Referred By: [email or web address]   SELF           Confirmed By:                             Imaging Results          X-Ray Chest AP Portable (Final result)  Result time 12/12/20 13:37:25    Final result by Nga Porter MD (12/12/20 13:37:25)                 Narrative:    PROCEDURE:   XR CHEST AP PORTABLE  dated  12/12/2020 1:42 PM    CLINICAL HISTORY:   Female 75 years of age.   SOB    TECHNIQUE: AP view of the chest obtained portably at 1:42 PM.    PREVIOUS STUDIES:  December 1, 2020. September 5, 2020.    FINDINGS:    Moderate cardiomegaly, small right greater than left pleural effusions  and bilateral interstitial opacities and groundglass are stable and  chronic.    IMPRESSION: No change. Moderate cardiomegaly. Chronic groundglass,  interstitial opacities, small right greater than left pleural  effusions.    Electronically Signed by Nga Porter on 12/12/2020 1:55 PM                                            Attending Attestation:             Attending ED Notes:   This 75-year-old female who has a history of chronic kidney disease on dialysis, CHF in the history to of atrial fib, presented acutely dyspneic despite having been dialyzed yesterday.  Patient's blood pressure was noticeably elevated with a diastolic of 114 a systolic over 200.  During the ED course the patient was placed on BiPAP and also given labetalol 10 mg IV for her hypertension.  The respiratory distress markedly improved her blood pressure improved to 180/84.  Hospital Medicine is consulted and consult will also be placed to Nephrology,   Juan Jose.  The patient's chest x-ray is read as not being appreciably different from old films but is markedly abnormal with bilateral infiltrates.                    Clinical Impression:     ICD-10-CM ICD-9-CM   1. Congestive heart failure, unspecified HF chronicity, unspecified heart failure type  I50.9 428.0   2. SOB (shortness of breath)  R06.02 786.05   3. Uncontrolled hypertension  I10 401.9                          ED Disposition Condition    Admit                             Kenji Sanford Jr., MD  12/12/20 6098

## 2020-12-12 NOTE — CARE UPDATE
12/12/20 1656   Patient Assessment/Suction   Level of Consciousness (AVPU) alert   Respiratory Effort Normal;Unlabored   PRE-TX-O2   O2 Device (Oxygen Therapy) BiPAP   SpO2 100 %   Pulse 83   Resp (!) 32   Wound Care   $ Wound Care Tech Time 15 min   Area of Concern Bridge of nose   Skin Color/Characteristics without discoloration   Skin Temperature warm   Preset CPAP/BiPAP Settings   Mode Of Delivery BiPAP S/T   Transport Patient   $ Transport Tech Time Charge 30 min   Oxygen Method   (5 OXYMASK)   Transport to [facility]   Toleration Good   RETURNED PT TO BIPAP AT PREVIOUS SETTINGS

## 2020-12-12 NOTE — HPI
75-year-old female well known to hospitalist service presents ED w SOB. Pattient has hx atrial fibrillation, atrial thrombus ESRD HD MWF,CHF EF48% HTN calciphylaxis of both legs anemia of chronic disease   Patient was recently hospitalized 12 /1 through 12/ 4 for acute blood loss anemia  which was found to be secondary to angiodysplastic lesions that required coagulation and clips being placed.  Patient was taken off her Coumadin at that time and has not restarted.  Patient states she last received dialysis yesterday and normal amount of fluid was taken off.  She returns to the emergency department today with complaints of of shortness of breath x1 day.  In the ED blood pressure was 247/114 and chest x-ray showed findings consistent with CHF.  Patient currently on BiPAP

## 2020-12-13 LAB
ANION GAP SERPL CALC-SCNC: 12 MMOL/L (ref 8–16)
BUN SERPL-MCNC: 32 MG/DL (ref 8–23)
CALCIUM SERPL-MCNC: 8 MG/DL (ref 8.7–10.5)
CHLORIDE SERPL-SCNC: 102 MMOL/L (ref 95–110)
CO2 SERPL-SCNC: 26 MMOL/L (ref 23–29)
CREAT SERPL-MCNC: 5.5 MG/DL (ref 0.5–1.4)
ERYTHROCYTE [DISTWIDTH] IN BLOOD BY AUTOMATED COUNT: 17.6 % (ref 11.5–14.5)
EST. GFR  (AFRICAN AMERICAN): 8.2 ML/MIN/1.73 M^2
EST. GFR  (NON AFRICAN AMERICAN): 7.1 ML/MIN/1.73 M^2
ESTIMATED AVG GLUCOSE: NORMAL MG/DL (ref 68–131)
GLUCOSE SERPL-MCNC: 70 MG/DL (ref 70–110)
HBA1C MFR BLD HPLC: NORMAL % (ref 4.5–6.2)
HCT VFR BLD AUTO: 30.3 % (ref 37–48.5)
HGB BLD-MCNC: 8.7 G/DL (ref 12–16)
MAGNESIUM SERPL-MCNC: 1.9 MG/DL (ref 1.6–2.6)
MCH RBC QN AUTO: 26.4 PG (ref 27–31)
MCHC RBC AUTO-ENTMCNC: 28.7 G/DL (ref 32–36)
MCV RBC AUTO: 92 FL (ref 82–98)
PLATELET # BLD AUTO: 138 K/UL (ref 150–350)
PMV BLD AUTO: 10.8 FL (ref 9.2–12.9)
POTASSIUM SERPL-SCNC: 4.5 MMOL/L (ref 3.5–5.1)
RBC # BLD AUTO: 3.3 M/UL (ref 4–5.4)
SODIUM SERPL-SCNC: 140 MMOL/L (ref 136–145)
TROPONIN I SERPL DL<=0.01 NG/ML-MCNC: 0.26 NG/ML
WBC # BLD AUTO: 3.37 K/UL (ref 3.9–12.7)

## 2020-12-13 PROCEDURE — 25000003 PHARM REV CODE 250: Performed by: INTERNAL MEDICINE

## 2020-12-13 PROCEDURE — 36415 COLL VENOUS BLD VENIPUNCTURE: CPT

## 2020-12-13 PROCEDURE — 27000221 HC OXYGEN, UP TO 24 HOURS

## 2020-12-13 PROCEDURE — 80048 BASIC METABOLIC PNL TOTAL CA: CPT

## 2020-12-13 PROCEDURE — 99900031 HC PATIENT EDUCATION (STAT)

## 2020-12-13 PROCEDURE — 94660 CPAP INITIATION&MGMT: CPT

## 2020-12-13 PROCEDURE — 94761 N-INVAS EAR/PLS OXIMETRY MLT: CPT

## 2020-12-13 PROCEDURE — 99900035 HC TECH TIME PER 15 MIN (STAT)

## 2020-12-13 PROCEDURE — 85027 COMPLETE CBC AUTOMATED: CPT

## 2020-12-13 PROCEDURE — 21000000 HC CCU ICU ROOM CHARGE

## 2020-12-13 PROCEDURE — 25000003 PHARM REV CODE 250: Performed by: HOSPITALIST

## 2020-12-13 PROCEDURE — 83735 ASSAY OF MAGNESIUM: CPT

## 2020-12-13 RX ORDER — LABETALOL HYDROCHLORIDE 5 MG/ML
10 INJECTION, SOLUTION INTRAVENOUS EVERY 4 HOURS PRN
Status: DISCONTINUED | OUTPATIENT
Start: 2020-12-13 | End: 2020-12-14 | Stop reason: HOSPADM

## 2020-12-13 RX ORDER — METOPROLOL TARTRATE 1 MG/ML
5 INJECTION, SOLUTION INTRAVENOUS ONCE
Status: COMPLETED | OUTPATIENT
Start: 2020-12-13 | End: 2020-12-13

## 2020-12-13 RX ORDER — SODIUM THIOSULFATE 250 MG/ML
12.5 INJECTION, SOLUTION INTRAVENOUS
Status: DISCONTINUED | OUTPATIENT
Start: 2020-12-14 | End: 2020-12-14 | Stop reason: HOSPADM

## 2020-12-13 RX ADMIN — MAGNESIUM OXIDE 400 MG: 400 TABLET ORAL at 11:12

## 2020-12-13 RX ADMIN — ISOSORBIDE MONONITRATE 30 MG: 30 TABLET, EXTENDED RELEASE ORAL at 11:12

## 2020-12-13 RX ADMIN — LABETALOL HYDROCHLORIDE 10 MG: 5 INJECTION, SOLUTION INTRAVENOUS at 06:12

## 2020-12-13 RX ADMIN — METOPROLOL TARTRATE 5 MG: 5 INJECTION, SOLUTION INTRAVENOUS at 06:12

## 2020-12-13 RX ADMIN — LOSARTAN POTASSIUM 25 MG: 25 TABLET, FILM COATED ORAL at 11:12

## 2020-12-13 RX ADMIN — HYDRALAZINE HYDROCHLORIDE 25 MG: 25 TABLET, FILM COATED ORAL at 06:12

## 2020-12-13 RX ADMIN — DILTIAZEM HYDROCHLORIDE 240 MG: 120 CAPSULE, COATED, EXTENDED RELEASE ORAL at 11:12

## 2020-12-13 RX ADMIN — METOPROLOL SUCCINATE 25 MG: 25 TABLET, FILM COATED, EXTENDED RELEASE ORAL at 08:12

## 2020-12-13 RX ADMIN — HYDRALAZINE HYDROCHLORIDE 25 MG: 25 TABLET, FILM COATED ORAL at 01:12

## 2020-12-13 RX ADMIN — CALCIUM ACETATE 667 MG: 667 CAPSULE ORAL at 11:12

## 2020-12-13 RX ADMIN — HYDRALAZINE HYDROCHLORIDE 25 MG: 25 TABLET, FILM COATED ORAL at 10:12

## 2020-12-13 RX ADMIN — ACETAMINOPHEN 650 MG: 325 TABLET, FILM COATED ORAL at 01:12

## 2020-12-13 RX ADMIN — ZINC SULFATE 220 MG (50 MG) CAPSULE 220 MG: CAPSULE at 11:12

## 2020-12-13 RX ADMIN — LABETALOL HYDROCHLORIDE 10 MG: 5 INJECTION, SOLUTION INTRAVENOUS at 08:12

## 2020-12-13 NOTE — PROGRESS NOTES
[facility] Medicine  Progress Note    Patient Name: Griselda Neely  MRN: [id number]  Admission Date: 12/12/2020  Attending Physician: Yang Royal MD   Primary Care Provider: Kalie Neely MD   Date of service:  12/13/2020    Subjective:     Principal Problem:Hypertensive emergency    Chief Complaint:   Chief Complaint   Patient presents with    Shortness of Breath     X 1 DAY    Cough        HPI: 75-year-old female well known to hospitalist service presents ED w SOB. Pattient has hx atrial fibrillation, atrial thrombus ESRD HD MWF,CHF EF48% HTN calciphylaxis of both legs anemia of chronic disease   Patient was recently hospitalized 12 /1 through 12/ 4 for acute blood loss anemia  which was found to be secondary to angiodysplastic lesions that required coagulation and clips being placed.  Patient was taken off her Coumadin at that time and has not restarted.  Patient states she last received dialysis yesterday and normal amount of fluid was taken off.  She returns to the emergency department today with complaints of of shortness of breath x1 day.  In the ED blood pressure was 247/114 and chest x-ray showed findings consistent with CHF.  Patient currently on BiPAP    Interval history:  Today the patient reports improved symptoms overnight with persistent shortness of breath is mild intensity, worse with exertion, constant timing.  No fever.  Minimal cough.  Blood pressure still elevated.  Patient refused blood pressure medicines this morning but later agreed to take them.  She feels better with minimal headache.     Physical exam:  Vital signs reviewed  General:  Comfortable, nontoxic, frail  Head and eyes:  Anicteric sclerae, no conjunctival discharge  ENT moist mucous membranes, no thrush  Pulmonary:  Comfortable work breathing, lungs clear  Cardiovascular:  2 radial pulses, regular rate and rhythm  GI:  Abdomen soft and nontender  Skin:  Dry and warm no jaundice  Psych:   Mood is calm, affect normal insight fair   Neuro:  Nonfocal motor exam fluent speech alert    Laboratory data:  Labs reviewed    Assessment/Plan:     #1 Acute hypertensive emergency-now improved  Will continue to monitor blood pressure, may require continuous IV medication infusion.  #2 Acute diastolic CHF rEF 48%-secondary to to #1.  ED has consulted Renal.  Patient will most likely need dialysis today  #3 Paroxysmal atrial fibrillation/hx atrial thrombus-patient currently off her Coumadin due to recent GI bleed (angiodysplastic lesions x2)  #4 ESRD HD as per renal   #5 Anemia chronic disease-follow H&H  #6 Hx caliphylaxis     Plan update today:  Continue care  Continue home blood pressure medications.  Titrate regimen as needed.  IV labetalol as needed  Continue hemodialysis per Nephrology.  Renal dose meds.  Monitor urine output and fluid status.  Continue supportive care including pain control and antiemetics as needed  Serial labs.  Replace electrolytes needed.  Mobilize as able  High risk secondary to acute illness with risk to life from hypertensive emergency  Disposition:  Possible discharge next 24 hours if patient stabilizes with additional fluid loss and stabilization of blood pressure    VTE Risk Mitigation (From admission, onward)         Ordered     enoxaparin injection 30 mg  Every 24 hours      12/12/20 1532     IP VTE HIGH RISK PATIENT  Once      12/12/20 1532     Place sequential compression device  Until discontinued      12/12/20 1532                   Yang Royal MD  Department of Hospital Medicine   [facility]

## 2020-12-13 NOTE — PLAN OF CARE
12/12/20 2000   Patient Assessment/Suction   Level of Consciousness (AVPU) alert   Respiratory Effort Normal;Unlabored   Expansion/Accessory Muscles/Retractions no use of accessory muscles   All Lung Fields Breath Sounds equal bilaterally   Rhythm/Pattern, Respiratory assisted mechanically   PRE-TX-O2   O2 Device (Oxygen Therapy) BiPAP   SpO2 100 %   Pulse Oximetry Type Continuous   $ Pulse Oximetry - Multiple Charge Pulse Oximetry - Multiple   Pulse 82   Resp (!) 32   BP (!) 213/98   Wound Care   $ Wound Care Tech Time 15 min   Area of Concern Bridge of nose   Skin Color/Characteristics without discoloration   Skin Temperature cool   Preset CPAP/BiPAP Settings   Mode Of Delivery BiPAP   $ Is patient using? Yes   Size of Mask Small   Sized Appropriately? Yes   Equipment Type V60   Airway Device Type small full face mask   Ipap 16   EPAP (cm H2O) 8   Pressure Support (cm H2O) 8   ITime (sec) 0.8   Rise Time (sec) 2   Education   $ Education BiPAP;15 min   Respiratory Evaluation   $ Care Plan Tech Time 15 min

## 2020-12-13 NOTE — NURSING
Notified Dr. Lea of increasing HR. HR increased through the shift from in the 80's to in the 120's . Lopressor 5mg iv was ordered and noted

## 2020-12-13 NOTE — CONSULTS
Critical access hospital  Nephrology  Consult Note    Patient Name: Griselda Neely  MRN: [id number]  Admission Date: 12/12/2020  Hospital Length of Stay: 1 days  Attending Provider: Basil Velasquez DO   Primary Care Physician: Kalie Neely MD  Principal Problem:Hypertensive emergency    Consults  Subjective:     HPI:  Griselda Neely is a 75-year-old female who arrived at [facility] on 12/12/2020 for evaluation of shortness of breath for 1 day.  PMHx including:  ESRD, Atrial fibrillation, CHF, hypertension, and calciphylaxis.  Patient recently hospitalized approximately 1 week ago for acute blood loss.  Angiodysplastic lesions requiring coagulation and clips.  She is well-known to our practice and attends [facility] ([facility]) on M-W-F schedule.  She was last dialyzed on 11/11/2020.  Her nephrologist is Dr. Ingram.  Nephrology is consulted for ESRD management.    In the ED, hypertensive crisis was treated with labetalol and placed on BiPAP.  Patient was hemodialyzed yesterday with shortness of breath improving.  Today, patient off BiPAP and on nasal cannula 4 L. She is feeling better today.       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    both legs    CHF (congestive heart failure)     Encounter for blood transfusion 03/2016    Gout     Hemodialysis access, AV graft     Hypertension     Mitral valve regurgitation     Osteoarthritis     Pancreatitis     Peripheral vascular disease     Pneumonia 09/09/2017    Renal failure        Past Surgical History:   Procedure Laterality Date    ACHILLES TENDON SURGERY Right     ANGIOGRAPHY OF ARTERIOVENOUS SHUNT Right 1/7/2020    Procedure: Fistulogram with Possible Intervention;  Surgeon: Joseph Loyola MD;  Location: [facility] CATH/EP LAB;  Service: Cardiology;  Laterality: Right;    ANGIOGRAPHY OF LOWER EXTREMITY Left 3/18/2020    Procedure: Angiogram Extremity Unilateral;  Surgeon: Collin  Khoobehi, MD;  Location: [facility] CATH/EP LAB;  Service: Cardiology;  Laterality: Left;    APPENDECTOMY      CARDIAC CATHETERIZATION  07/03/2017    CHOLECYSTECTOMY      COLONOSCOPY Left 10/4/2020    Procedure: COLONOSCOPY;  Surgeon: Jordan Kilpatrick MD;  Location: [facility] ENDO;  Service: Endoscopy;  Laterality: Left;    ESOPHAGOGASTRODUODENOSCOPY Left 8/17/2020    Procedure: EGD (ESOPHAGOGASTRODUODENOSCOPY);  Surgeon: Talat Trevizo MD;  Location: [facility] ENDO;  Service: Endoscopy;  Laterality: Left;    ESOPHAGOGASTRODUODENOSCOPY Left 10/2/2020    Procedure: EGD (ESOPHAGOGASTRODUODENOSCOPY);  Surgeon: Talat Trevizo MD;  Location: [facility] ENDO;  Service: Endoscopy;  Laterality: Left;    heal surgery Right     HYSTERECTOMY      INSERTION OF STENT INTO PERIPHERAL VESSEL N/A 1/7/2020    Procedure: INSERTION, STENT, VESSEL, PERIPHERAL;  Surgeon: Joseph Loyola MD;  Location: [facility] CATH/EP LAB;  Service: Cardiology;  Laterality: N/A;    MITRAL VALVE REPLACEMENT      PARATHYROIDECTOMY      PARATHYROIDECTOMY  07/13/2017    PERCUTANEOUS TRANSLUMINAL ANGIOPLASTY OF ARTERIOVENOUS FISTULA N/A 1/7/2020    Procedure: PTA, AV FISTULA;  Surgeon: Joseph Loyola MD;  Location: [facility] CATH/EP LAB;  Service: Cardiology;  Laterality: N/A;    PERITONEAL CATHETER INSERTION      RENAL BIOPSY      SMALL BOWEL ENTEROSCOPY N/A 12/2/2020    Procedure: ENTEROSCOPY;  Surgeon: Sudheer Brown III, MD;  Location: [facility] ENDO;  Service: Endoscopy;  Laterality: N/A;    vocal cord nodule      WOUND DEBRIDEMENT Left 7/13/2020    Procedure: DEBRIDEMENT, WOUND;  Surgeon: Carlos Elam III, MD;  Location: [facility] OR;  Service: General;  Laterality: Left;       Review of patient's allergies indicates:  No Known Allergies  Current Facility-Administered Medications   Medication Frequency    acetaminophen tablet 650 mg Q6H PRN    calcium acetate(phosphat bind) capsule 667 mg Daily    diltiaZEM 24 hr capsule 240 mg Daily    enoxaparin  injection 30 mg Q24H    hydrALAZINE tablet 25 mg Q8H    isosorbide mononitrate 24 hr tablet 30 mg Daily    levalbuterol nebulizer solution 0.63 mg Q8H PRN    losartan tablet 25 mg Daily    magnesium oxide tablet 400 mg Daily    metoprolol succinate (TOPROL-XL) 24 hr tablet 25 mg Nightly    nicotine 14 mg/24 hr 1 patch Daily    ondansetron injection 4 mg Q6H PRN    oxyCODONE-acetaminophen 5-325 mg per tablet 1 tablet Q6H PRN    [START ON 12/14/2020] traMADoL tablet 50 mg Three Times Weekly    zinc sulfate capsule 220 mg Daily     Family History     Problem Relation (Age of Onset)    Arthritis Mother    Diabetes Mother, Father    Early death Sister, Sister    Heart disease Sister, Maternal Grandfather, Mother    Heart failure Mother    Hypertension Mother        Tobacco Use    Smoking status: Current Some Day Smoker     Packs/day: 0.50     Years: 45.00     Pack years: 22.50     Types: Cigarettes    Smokeless tobacco: Never Used   Substance and Sexual Activity    Alcohol use: No    Drug use: No    Sexual activity: Not Currently     Review of Systems   Constitutional: Negative for chills and fever.   Respiratory: Positive for shortness of breath (improving).    Cardiovascular: Negative.    Gastrointestinal: Negative.    Neurological: Negative.      Objective:     Vital Signs (Most Recent):  Temp: 98.4 °F (36.9 °C) (12/13/20 1110)  Pulse: 95 (12/13/20 1110)  Resp: 20 (12/13/20 1110)  BP: (!) 177/78 (12/13/20 1110)  SpO2: 100 % (12/13/20 1110)  O2 Device (Oxygen Therapy): High Flow nasal Cannula (12/13/20 0945) Vital Signs (24h Range):  Temp:  [97.3 °F (36.3 °C)-98.4 °F (36.9 °C)] 98.4 °F (36.9 °C)  Pulse:  [] 95  Resp:  [12-36] 20  SpO2:  [97 %-100 %] 100 %  BP: (140-224)/() 177/78     Weight: 59.9 kg (132 lb) (12/12/20 1312)  Body mass index is 21.97 kg/m².  Body surface area is 1.66 meters squared.    I/O last 3 completed shifts:  In: 500 [Other:500]  Out: 3500 [Other:3500]    Physical  Exam  Vitals signs and nursing note reviewed.   Constitutional:       Appearance: Normal appearance.   HENT:      Head: Normocephalic and atraumatic.      Mouth/Throat:      Mouth: Mucous membranes are moist.   Eyes:      Pupils: Pupils are equal, round, and reactive to light.   Neck:      Musculoskeletal: Normal range of motion and neck supple.      Comments: Neck veins present while sitting; refusing to lay down  Cardiovascular:      Rate and Rhythm: Normal rate.   Pulmonary:      Effort: Pulmonary effort is normal.      Breath sounds: Rhonchi (right worse than left) present. No wheezing or rales.   Abdominal:      General: There is no distension.      Palpations: Abdomen is soft.   Musculoskeletal:      Right lower leg: No edema.      Left lower leg: No edema.   Skin:     General: Skin is warm and dry.   Neurological:      Mental Status: She is alert and oriented to person, place, and time.   Psychiatric:         Mood and Affect: Mood normal.         Behavior: Behavior normal.         Significant Labs:  CBC:   Recent Labs   Lab 12/13/20  0617   WBC 3.37*   RBC 3.30*   HGB 8.7*   HCT 30.3*   *   MCV 92   MCH 26.4*   MCHC 28.7*     CMP:   Recent Labs   Lab 12/12/20  1327 12/13/20  0617    70   CALCIUM 8.0* 8.0*   ALBUMIN 3.6  --    PROT 9.5*  --     140   K 3.9 4.5   CO2 24 26    102   BUN 45* 32*   CREATININE 6.8* 5.5*   ALKPHOS 135  --    ALT 12  --    AST 23  --    BILITOT 1.1*  --      All labs within the past 24 hours have been reviewed.    Significant Imaging:      Assessment/Plan:     Active Diagnoses:    Diagnosis Date Noted POA    PRINCIPAL PROBLEM:  Hypertensive emergency [I16.1] 12/12/2020 Unknown    Congestive heart failure [I50.9] 12/12/2020 Yes      Problems Resolved During this Admission:       Assessment and Plan:    ESRD:  -HD on M-W-F schedule  -improved renal function after HD yesterday.  -Clinically improved today.  -renal chemistries stable.  -sodium thiosulfate with  HD for calciphylaxis    Hypertension:  -uncontrolled; improving  -refused a.m. meds.  -titrate as needed.  -HM following    CHF:  -Off BiPAP; 4 L nasal cannula  -EF 48% (11/8/20)  -symptoms resolving after HD yesterday.  -chest x-ray revealing small right greater than left pleural effusions    Anemia:  -below goal; stable for now   -monitor closely    Renal BMD:  -hypocalcemia  -albumin ok  -check phosphorus; continue binders for now.            Thank you for your consult. I will follow-up with patient. Please contact us if you have any additional questions.    Gabe Barksdale NP  Nephrology  [facility]

## 2020-12-13 NOTE — NURSING
@1000 pt refused to take morning meds, asked nurse to come back at a later time. Will try again.    @1105 Pt agreed to take morning meds.

## 2020-12-13 NOTE — PLAN OF CARE
12/13/20 0900   Patient Assessment/Suction   Level of Consciousness (AVPU) alert   Respiratory Effort Unlabored;Normal   Expansion/Accessory Muscles/Retractions no use of accessory muscles   Rhythm/Pattern, Respiratory unlabored   PRE-TX-O2   O2 Device (Oxygen Therapy) High Flow nasal Cannula   $ Is the patient on Low Flow Oxygen? Yes   Flow (L/min) 4   SpO2 100 %   Pulse (!) 127   Resp (!) 27   Aerosol Therapy   $ Aerosol Therapy Charges PRN treatment not required   Preset CPAP/BiPAP Settings   Mode Of Delivery BiPAP   $ CPAP/BiPAP Daily Charge BiPAP/CPAP Daily   $ Initial CPAP/BiPAP Setup? No   $ Is patient using? Other (see comments)  (standby)   Respiratory Evaluation   $ Care Plan Tech Time 15 min

## 2020-12-14 VITALS
HEIGHT: 65 IN | RESPIRATION RATE: 18 BRPM | HEART RATE: 66 BPM | TEMPERATURE: 98 F | WEIGHT: 142.44 LBS | SYSTOLIC BLOOD PRESSURE: 178 MMHG | DIASTOLIC BLOOD PRESSURE: 79 MMHG | OXYGEN SATURATION: 100 % | BODY MASS INDEX: 23.73 KG/M2

## 2020-12-14 LAB
ALBUMIN SERPL BCP-MCNC: 2.9 G/DL (ref 3.5–5.2)
ANION GAP SERPL CALC-SCNC: 10 MMOL/L (ref 8–16)
BUN SERPL-MCNC: 47 MG/DL (ref 8–23)
CALCIUM SERPL-MCNC: 7.8 MG/DL (ref 8.7–10.5)
CHLORIDE SERPL-SCNC: 100 MMOL/L (ref 95–110)
CO2 SERPL-SCNC: 25 MMOL/L (ref 23–29)
CREAT SERPL-MCNC: 6.6 MG/DL (ref 0.5–1.4)
EST. GFR  (AFRICAN AMERICAN): 6.6 ML/MIN/1.73 M^2
EST. GFR  (NON AFRICAN AMERICAN): 5.7 ML/MIN/1.73 M^2
GLUCOSE SERPL-MCNC: 70 MG/DL (ref 70–110)
PHOSPHATE SERPL-MCNC: 4.3 MG/DL (ref 2.7–4.5)
POTASSIUM SERPL-SCNC: 3.9 MMOL/L (ref 3.5–5.1)
SODIUM SERPL-SCNC: 135 MMOL/L (ref 136–145)

## 2020-12-14 PROCEDURE — 94761 N-INVAS EAR/PLS OXIMETRY MLT: CPT

## 2020-12-14 PROCEDURE — 36415 COLL VENOUS BLD VENIPUNCTURE: CPT

## 2020-12-14 PROCEDURE — 99900035 HC TECH TIME PER 15 MIN (STAT)

## 2020-12-14 PROCEDURE — 27000221 HC OXYGEN, UP TO 24 HOURS

## 2020-12-14 PROCEDURE — 25000003 PHARM REV CODE 250: Performed by: INTERNAL MEDICINE

## 2020-12-14 PROCEDURE — 90935 HEMODIALYSIS ONE EVALUATION: CPT

## 2020-12-14 PROCEDURE — S4991 NICOTINE PATCH NONLEGEND: HCPCS | Performed by: HOSPITALIST

## 2020-12-14 PROCEDURE — 25000003 PHARM REV CODE 250: Performed by: NURSE PRACTITIONER

## 2020-12-14 PROCEDURE — 80069 RENAL FUNCTION PANEL: CPT

## 2020-12-14 PROCEDURE — 25000003 PHARM REV CODE 250: Performed by: HOSPITALIST

## 2020-12-14 PROCEDURE — 94660 CPAP INITIATION&MGMT: CPT

## 2020-12-14 RX ORDER — HYDRALAZINE HYDROCHLORIDE 50 MG/1
50 TABLET, FILM COATED ORAL EVERY 8 HOURS
Qty: 90 TABLET | Refills: 11 | Status: SHIPPED | OUTPATIENT
Start: 2020-12-14 | End: 2020-12-31 | Stop reason: SDUPTHER

## 2020-12-14 RX ORDER — SODIUM CHLORIDE 9 MG/ML
INJECTION, SOLUTION INTRAVENOUS ONCE
Status: CANCELLED | OUTPATIENT
Start: 2020-12-14 | End: 2020-12-14

## 2020-12-14 RX ORDER — HYDRALAZINE HYDROCHLORIDE 25 MG/1
50 TABLET, FILM COATED ORAL EVERY 8 HOURS
Status: DISCONTINUED | OUTPATIENT
Start: 2020-12-14 | End: 2020-12-14 | Stop reason: HOSPADM

## 2020-12-14 RX ORDER — MUPIROCIN 20 MG/G
OINTMENT TOPICAL 2 TIMES DAILY
Status: CANCELLED | OUTPATIENT
Start: 2020-12-14 | End: 2020-12-19

## 2020-12-14 RX ORDER — SODIUM CHLORIDE 9 MG/ML
INJECTION, SOLUTION INTRAVENOUS
Status: CANCELLED | OUTPATIENT
Start: 2020-12-14

## 2020-12-14 RX ADMIN — LABETALOL HYDROCHLORIDE 10 MG: 5 INJECTION, SOLUTION INTRAVENOUS at 08:12

## 2020-12-14 RX ADMIN — DILTIAZEM HYDROCHLORIDE 240 MG: 120 CAPSULE, COATED, EXTENDED RELEASE ORAL at 08:12

## 2020-12-14 RX ADMIN — ZINC SULFATE 220 MG (50 MG) CAPSULE 220 MG: CAPSULE at 08:12

## 2020-12-14 RX ADMIN — ISOSORBIDE MONONITRATE 30 MG: 30 TABLET, EXTENDED RELEASE ORAL at 08:12

## 2020-12-14 RX ADMIN — LABETALOL HYDROCHLORIDE 10 MG: 5 INJECTION, SOLUTION INTRAVENOUS at 12:12

## 2020-12-14 RX ADMIN — SODIUM THIOSULFATE 12.5 G: 250 INJECTION, SOLUTION INTRAVENOUS at 09:12

## 2020-12-14 RX ADMIN — HYDRALAZINE HYDROCHLORIDE 25 MG: 25 TABLET, FILM COATED ORAL at 06:12

## 2020-12-14 RX ADMIN — CALCIUM ACETATE 667 MG: 667 CAPSULE ORAL at 08:12

## 2020-12-14 RX ADMIN — LOSARTAN POTASSIUM 25 MG: 25 TABLET, FILM COATED ORAL at 08:12

## 2020-12-14 RX ADMIN — NICOTINE 1 PATCH: 14 PATCH, EXTENDED RELEASE TRANSDERMAL at 08:12

## 2020-12-14 RX ADMIN — MAGNESIUM OXIDE 400 MG: 400 TABLET ORAL at 08:12

## 2020-12-14 NOTE — PLAN OF CARE
This note also relates to the following rows which could not be included:  SpO2 - Cannot attach notes to unvalidated device data  Pulse - Cannot attach notes to unvalidated device data  Resp - Cannot attach notes to unvalidated device data  BP - Cannot attach notes to unvalidated device data       12/13/20 2200   Patient Assessment/Suction   Level of Consciousness (AVPU) alert   Respiratory Effort Normal;Unlabored   Expansion/Accessory Muscles/Retractions no use of accessory muscles   All Lung Fields Breath Sounds equal bilaterally;diminished   PRE-TX-O2   O2 Device (Oxygen Therapy) BiPAP   Pulse Oximetry Type Continuous   $ Pulse Oximetry - Multiple Charge Pulse Oximetry - Multiple   Preset CPAP/BiPAP Settings   Mode Of Delivery BiPAP   $ Is patient using? Yes   Size of Mask Small   Sized Appropriately? Yes   Equipment Type V60   Airway Device Type small full face mask   Ipap 16   EPAP (cm H2O) 8   Pressure Support (cm H2O) 8   Education   $ Education BiPAP;15 min   Respiratory Evaluation   $ Care Plan Tech Time 15 min   Evaluation For Re-Eval 3 day

## 2020-12-14 NOTE — PROGRESS NOTES
Critical access hospital  Nephrology  Progress Note    Patient Name: Griselda Neely  MRN: [id number]  Admission Date: 12/12/2020  Hospital Length of Stay: 2 days  Attending Provider: Yang Royal MD   Primary Care Physician: Kalie Neely MD  Principal Problem:Hypertensive emergency    Consults  Subjective:     Interval History: Patient seen and examined at bedside. No acute overnight issues. Will HD today. Agreeable to POC, although she does not want to. No complaints other then wanting to go home. States breathing is improved.    Review of patient's allergies indicates:  No Known Allergies  Current Facility-Administered Medications   Medication Frequency    acetaminophen tablet 650 mg Q6H PRN    calcium acetate(phosphat bind) capsule 667 mg Daily    diltiaZEM 24 hr capsule 240 mg Daily    enoxaparin injection 30 mg Q24H    hydrALAZINE tablet 25 mg Q8H    isosorbide mononitrate 24 hr tablet 30 mg Daily    labetaloL injection 10 mg Q4H PRN    levalbuterol nebulizer solution 0.63 mg Q8H PRN    losartan tablet 25 mg Daily    magnesium oxide tablet 400 mg Daily    metoprolol succinate (TOPROL-XL) 24 hr tablet 25 mg Nightly    nicotine 14 mg/24 hr 1 patch Daily    ondansetron injection 4 mg Q6H PRN    oxyCODONE-acetaminophen 5-325 mg per tablet 1 tablet Q6H PRN    sodium thiosulfate 12.5 gram/50 mL (250 mg/mL) injection 12.5 g Every Mon, Wed, Fri    traMADoL tablet 50 mg Three Times Weekly    zinc sulfate capsule 220 mg Daily       Objective:     Vital Signs (Most Recent):  Temp: 97.7 °F (36.5 °C) (12/14/20 0710)  Pulse: 74 (12/14/20 0852)  Resp: 19 (12/14/20 0852)  BP: (!) 185/86(notified nurse) (12/14/20 0710)  SpO2: 100 % (12/14/20 0852)  O2 Device (Oxygen Therapy): BiPAP (12/14/20 0852) Vital Signs (24h Range):  Temp:  [97.7 °F (36.5 °C)-97.8 °F (36.6 °C)] 97.7 °F (36.5 °C)  Pulse:  [68-84] 74  Resp:  [19-26] 19  SpO2:  [98 %-100 %] 100 %  BP: (167-187)/(77-89) 185/86     Weight: 64.6 kg (142 lb 6.7  oz) (12/14/20 5645)  Body mass index is 23.7 kg/m².  Body surface area is 1.72 meters squared.    I/O last 3 completed shifts:  In: 800 [P.O.:300; Other:500]  Out: 3500 [Other:3500]    Physical Exam  Constitutional:       Appearance: Normal appearance. She is ill-appearing.   HENT:      Head: Normocephalic and atraumatic.      Mouth/Throat:      Mouth: Mucous membranes are dry.      Pharynx: Oropharynx is clear.   Eyes:      Extraocular Movements: Extraocular movements intact.   Cardiovascular:      Rate and Rhythm: Normal rate and regular rhythm.      Pulses: Normal pulses.      Heart sounds: Normal heart sounds.   Pulmonary:      Effort: Pulmonary effort is normal.      Breath sounds: Normal breath sounds.   Abdominal:      General: Bowel sounds are normal.      Palpations: Abdomen is soft.   Musculoskeletal: Normal range of motion.   Skin:     General: Skin is warm and dry.      Capillary Refill: Capillary refill takes less than 2 seconds.   Neurological:      Mental Status: She is alert and oriented to person, place, and time.   Psychiatric:         Mood and Affect: Mood normal.         Thought Content: Thought content normal.         Judgment: Judgment normal.         Significant Labs:sure  BMP:   Recent Labs   Lab 12/13/20  0617 12/14/20  0519   GLU 70 70    100   CO2 26 25   BUN 32* 47*   CREATININE 5.5* 6.6*   CALCIUM 8.0* 7.8*   MG 1.9  --      CBC:   Recent Labs   Lab 12/13/20  0617   WBC 3.37*   RBC 3.30*   HGB 8.7*   HCT 30.3*   *   MCV 92   MCH 26.4*   MCHC 28.7*     CMP:   Recent Labs   Lab 12/12/20  1327  12/14/20  0519      < > 70   CALCIUM 8.0*   < > 7.8*   ALBUMIN 3.6  --  2.9*   PROT 9.5*  --   --       < > 135*   K 3.9   < > 3.9   CO2 24   < > 25      < > 100   BUN 45*   < > 47*   CREATININE 6.8*   < > 6.6*   ALKPHOS 135  --   --    ALT 12  --   --    AST 23  --   --    BILITOT 1.1*  --   --     < > = values in this interval not displayed.     No results for  input(s): COLORU, CLARITYU, SPECGRAV, PHUR, PROTEINUA, GLUCOSEU, BILIRUBINCON, BLOODU, WBCU, RBCU, BACTERIA, MUCUS, NITRITE, LEUKOCYTESUR, UROBILINOGEN, HYALINECASTS in the last 168 hours.    Significant Imaging:  Labs: Reviewed    Assessment/Plan:     ESRD:  -HD on M-W-F schedule  -improved renal function after HD yesterday.  -Clinically improved today.  -renal chemistries stable.       Hypertension:  -Variable  -HM following     CHF:  -Off BiPAP; 4 L nasal cannula  -EF 48% (11/8/20)  -symptoms resolving after HD yesterday.  -chest x-ray revealing small right greater than left pleural effusions     Anemia:  -Overall stable  -monitor closely     Renal BMD:  -Calcium corrects for Albumin  -Phos stable; continue binders for now.  sodium thiosulfate with HD for calciphylaxis    Harinder Hatfield NP  Nephrology  [facility]

## 2020-12-14 NOTE — PLAN OF CARE
Spoke with pt on her room phone, verified her name, address, phone numbers from  and her Insurance with [employer]. Her Pharmacy is [facility] on [address] in [place]. No LW, No POA. Dialysis with [facility] on [facility] due to ESRD. Sees Dr Ortiz for her Coumadin levels.Was at [facility] about 3 weeks ago said pt for Bleeding Rectum and this admit for HTN and SOB. Using a Bipap now at [facility] and asked for a CPAP at home and explained that you need top have the Sleep Study first before insurance will allow a CPAP, and pt verbalized an understanding. PCP is Dr Kalie Neely.     Also asked about a WC, said the one she using is her moms old one. Noted that her PCP office has an order on 11- for a WC already.        12/14/20 1215   Discharge Assessment   Assessment Type Discharge Planning Assessment   Confirmed/corrected address and phone number on facesheet? Yes   Assessment information obtained from? Patient   Expected Length of Stay (days) 2   Communicated expected length of stay with patient/caregiver yes   Prior to hospitilization cognitive status: Alert/Oriented   Prior to hospitalization functional status: Independent   Current cognitive status: Alert/Oriented   Current Functional Status: Assistive Equipment;Needs Assistance;Partially Dependent   Facility Arrived From: Home   Lives With sibling(s)  (Lives with her sister Mrs Crowley in their family home)   Able to Return to Prior Arrangements yes   Is patient able to care for self after discharge? Yes   Who are your caregiver(s) and their phone number(s)? Son Mr Jovany Neely at cell [phone]; Sister Mrs Aide Watson at cell [phone]   Patient's perception of discharge disposition home or selfcare   Readmission Within the Last 30 Days current reason for admission unrelated to previous admission   If yes, most recent facility name: [facility]   Patient currently being followed by outpatient case management? Yes   If yes, name of  outpatient case management following: [facility] outpatient case management   Patient currently receives any other outside agency services? No   Equipment Currently Used at Home wheelchair;walker, rolling;cane, quad;cane, straight;bedside commode;shower chair;nebulizer;glucometer;grab bar   Part D Coverage [employer]   Do you have any problems affording any of your prescribed medications? No   Is the patient taking medications as prescribed? yes   Does the patient have transportation home? Yes   Transportation Anticipated family or friend will provide   Dialysis Name and Scheduled days ESRD with HD MWF at [facility] on [address]   Does the patient receive services at the Coumadin Clinic? Yes  (2.5 mg and Dr Ortiz office follows for Lab Levels)   Discharge Plan A Home with family   Discharge Plan B Home with family   DME Needed Upon Discharge  other (see comments)  (Pt says she needs a CPAP at home, but needs the Sleep Study First)   Patient/Family in Agreement with Plan yes   Readmission Questionnaire   At the time of your discharge, did someone talk to you about what your health problems were? Yes   At the time of discharge, did someone talk to you about what to watch out for regarding worsening of your health problem? Yes   At the time of discharge, did someone talk to you about what to do if you experienced worsening of your health problem? Yes   At the time of discharge, did someone talk to you about which medication to take when you left the hospital and which ones to stop taking? Yes   At the time of discharge, did someone talk to you about when and where to follow up with a doctor after you left the hospital? Yes   What do you believe caused you to be sick enough to be re-admitted? HTN and SOB   How often do you need to have someone help you when you read instructions, pamphlets, or other written material from your doctor or pharmacy? Never   Do you have problems taking your  medications as prescribed? No   Do you have any problems affording any of  your prescribed medications? No   Do you have problems obtaining/receiving your medications? No   Does the patient have transportation to healthcare appointments? Yes   Living Arrangements house   Does the patient have family/friends to help with healtcare needs after discharge? yes   Does your caregiver provide all the help you need? Yes   Are you currently feeling confused? No   Are you currently having problems thinking? No   Are you currently having memory problems? No   Have you felt down, depressed, or hopeless? 0   Have you felt little interest or pleasure in doing things? 0   In the last 7 days, my sleep quality was: good

## 2020-12-14 NOTE — PLAN OF CARE
Problem: Fall Injury Risk  Goal: Absence of Fall and Fall-Related Injury  Outcome: Ongoing, Progressing     Problem: Adult Inpatient Plan of Care  Goal: Plan of Care Review  Outcome: Ongoing, Progressing  Goal: Patient-Specific Goal (Individualization)  Outcome: Ongoing, Progressing  Goal: Absence of Hospital-Acquired Illness or Injury  Outcome: Ongoing, Progressing  Goal: Optimal Comfort and Wellbeing  Outcome: Ongoing, Progressing  Goal: Readiness for Transition of Care  Outcome: Ongoing, Progressing  Goal: Rounds/Family Conference  Outcome: Ongoing, Progressing     Problem: Fluid Imbalance (Pneumonia)  Goal: Fluid Balance  Outcome: Ongoing, Progressing     Problem: Fluid Imbalance (Pneumonia)  Goal: Fluid Balance  Outcome: Ongoing, Progressing

## 2020-12-14 NOTE — PLAN OF CARE
Plan of care, medications and safety reviewed with patient.      Problem: Fall Injury Risk  Goal: Absence of Fall and Fall-Related Injury  Outcome: Ongoing, Progressing     Problem: Adult Inpatient Plan of Care  Goal: Plan of Care Review  Outcome: Ongoing, Progressing  Goal: Patient-Specific Goal (Individualization)  Outcome: Ongoing, Progressing  Goal: Absence of Hospital-Acquired Illness or Injury  Outcome: Ongoing, Progressing  Goal: Optimal Comfort and Wellbeing  Outcome: Ongoing, Progressing  Goal: Readiness for Transition of Care  Outcome: Ongoing, Progressing  Goal: Rounds/Family Conference  Outcome: Ongoing, Progressing     Problem: Fluid Imbalance (Pneumonia)  Goal: Fluid Balance  Outcome: Ongoing, Progressing     Problem: Infection (Pneumonia)  Goal: Resolution of Infection Signs/Symptoms  Outcome: Ongoing, Progressing     Problem: Respiratory Compromise (Pneumonia)  Goal: Effective Oxygenation and Ventilation  Outcome: Ongoing, Progressing

## 2020-12-14 NOTE — PROGRESS NOTES
Net uf 3000 mls     12/14/20 1715   Post-Hemodialysis Assessment   Rinseback Volume (mL) 250 mL   Blood Volume Processed (Liters) 50 L   Dialyzer Clearance Lightly streaked   Duration of Treatment (minutes) 180 minutes   Hemodialysis Intake (mL) 500 mL   Total UF (mL) 3500 mL   Net Fluid Removal 3000   Patient Response to Treatment pt tolerated well   Post-Treatment Weight 61.6 kg (135 lb 12.9 oz)   Treatment Weight Change -3   Arterial bleeding stop time (min) 5 min   Venous bleeding stop time (min) 5 min   Post-Hemodialysis Comments treatment complete, pt stable. no complicaitons.

## 2020-12-14 NOTE — PROGRESS NOTES
"[facility]  Adult Nutrition   Progress Note (Initial Assessment)     SUMMARY     Recommendations/Interventions:  Recommendation/Intervention:   1. Continue current diet as tolerated by patient, encourage intake.  Goals: 1. Patient to meet at least 75% of estimated energy needs via PO intake.  Nutrition Goal Status: new    Dietitian Rounds Brief:  Seen 2' to nutrition risk screening--MST score 3. Patient presents with CHF. On HD M-W-F. Transitioned from BiPAP to 4 L nasal cannula today. She endorses poor PO intake, states she does not like hospital food. Offered ONS and patient declined. Noted patient food preferences. Possible d/c next 24 hours pending additional fluid loss and stable blood pressure. Will continue to monitor.    Reason for Assessment  Reason For Assessment: identified at risk by screening criteria(MST score 3)  Diagnosis: cardiac disease  Relevant Medical History: Pneumonia; calciphylaxis; anemia; A-fib; CHF; Gout; HTN; mitral valve regurgitation; osteoarthritis; pancreatitis; peripheral vascular disease; renal failure on HD  Interdisciplinary Rounds: did not attend    Nutrition Risk Screen  Nutrition Risk Screen: no indicators present  MST Score: 3  Have you recently lost weight without trying?: Unsure  Weight loss score: 2  Have you been eating poorly because of a decreased appetite?: Yes  Appetite score: 1       Nutrition/Diet History  Patient Reported Diet/Restrictions/Preferences: general  Spiritual, Cultural Beliefs, [employer] Practices, Values that Affect Care: no  Food Allergies: NKFA  Factors Affecting Nutritional Intake: other (see comments)(does not like hospital food)    Anthropometrics  Temp: 97.9 °F (36.6 °C)  Height Method: Stated  Height: 5' 5" (165.1 cm)  Height (inches): 65 in  Weight Method: Bed Scale  Weight: 64.6 kg (142 lb 6.7 oz)  Weight (lb): 142.42 lb  Ideal Body Weight (IBW), Female: 125 lb  % Ideal Body Weight, Female (lb): 113.94 %  BMI (Calculated): " 23.7  BMI Grade: 18.5-24.9 - normal      Weight History:  Wt Readings from Last 10 Encounters:   12/14/20 64.6 kg (142 lb 6.7 oz)   12/01/20 60.1 kg (132 lb 7.9 oz)   11/30/20 61.2 kg (135 lb)   11/18/20 59.5 kg (131 lb 2.8 oz)   11/09/20 61.6 kg (135 lb 12.9 oz)   11/04/20 60.8 kg (134 lb 0.6 oz)   10/27/20 59.1 kg (130 lb 4.7 oz)   10/25/20 60.3 kg (133 lb)   10/22/20 60.3 kg (133 lb)   10/06/20 53.5 kg (118 lb)     Lab/Procedures/Meds: Pertinent Labs Reviewed  Clinical Chemistry:  Recent Labs   Lab 12/12/20  1327 12/13/20  0617 12/14/20  0519    140 135*   K 3.9 4.5 3.9    102 100   CO2 24 26 25    70 70   BUN 45* 32* 47*   CREATININE 6.8* 5.5* 6.6*   CALCIUM 8.0* 8.0* 7.8*   PROT 9.5*  --   --    ALBUMIN 3.6  --  2.9*   BILITOT 1.1*  --   --    ALKPHOS 135  --   --    AST 23  --   --    ALT 12  --   --    ANIONGAP 15 12 10   ESTGFRAFRICA 6.3* 8.2* 6.6*   EGFRNONAA 5.5* 7.1* 5.7*   MG  --  1.9  --    PHOS  --   --  4.3     CBC:   Recent Labs   Lab 12/13/20  0617   WBC 3.37*   RBC 3.30*   HGB 8.7*   HCT 30.3*   *   MCV 92   MCH 26.4*   MCHC 28.7*     Cardiac Profile:  Recent Labs   Lab 12/12/20  1327 12/12/20  1745 12/12/20  2345   BNP >4,500*  --   --    TROPONINI 0.030 0.059* 0.264*     Diabetes:  Recent Labs   Lab 12/12/20  1745   HGBA1C SEE COMMENT     Medications: Pertinent Medications reviewed  Scheduled Meds:   calcium acetate(phosphat bind)  667 mg Oral Daily    diltiaZEM  240 mg Oral Daily    enoxaparin  30 mg Subcutaneous Q24H    hydrALAZINE  25 mg Oral Q8H    isosorbide mononitrate  30 mg Oral Daily    losartan  25 mg Oral Daily    magnesium oxide  400 mg Oral Daily    metoprolol succinate  25 mg Oral Nightly    nicotine  1 patch Transdermal Daily    sodium thiosulfate  12.5 g Intravenous Every Mon, Wed, Fri    traMADoL  50 mg Oral Three Times Weekly    zinc sulfate  220 mg Oral Daily     Continuous Infusions:  PRN Meds:.acetaminophen, labetalol, levalbuterol,  ondansetron, oxyCODONE-acetaminophen    Antibiotics (From admission, onward)    None           Estimated/Assessed Needs  Weight Used For Calorie Calculations: 64.6 kg (142 lb 6.7 oz)  Energy Calorie Requirements (kcal): 7775-8032 (25-30 kcal/kg)  Energy Need Method: Kcal/kg  Protein Requirements: 65-85 (1.0-1.3 g/kg)  Weight Used For Protein Calculations: 64.6 kg (142 lb 6.7 oz)  Estimated Fluid Requirement Method: RDA Method  RDA Method (mL): 1615    Nutrition Prescription Ordered  Current Diet Order: Renal    Evaluation of Received Nutrient/Fluid Intake  Energy Calories Required: not meeting needs  Protein Required: not meeting needs  Fluid Required: meeting needs  Tolerance: tolerating  % Meal Intake: 0 - 25 %    Intake/Output Summary (Last 24 hours) at 12/14/2020 1203  Last data filed at 12/14/2020 0500  Gross per 24 hour   Intake 300 ml   Output 0 ml   Net 300 ml      Nutrition Risk  Level of Risk/Frequency of Follow-up: moderate - high     Monitor and Evaluation  Food and Nutrient Intake: energy intake, food and beverage intake  Knowledge/Beliefs/Attitudes: food and nutrition knowledge/skill, beliefs and attitudes  Physical Activity and Function: nutrition-related ADLs and IADLs, factors affecting access to physical activity  Anthropometric Measurements: weight, weight change, body mass index  Biochemical Data, Medical Tests and Procedures: electrolyte and renal panel, gastrointestinal profile, glucose/endocrine profile, inflammatory profile, lipid profile  Nutrition-Focused Physical Findings: overall appearance     Nutrition Follow-Up  RD Follow-up?: Yes    Mehnaz Park, 12/14/2020, 12:15 PM

## 2020-12-14 NOTE — PLAN OF CARE
Vital signs, cardiac, and lab monitoring. Increase activity as tolerated. Bed alarm on. Watch for falls. Watch for signs and symptoms of bleeding.  Breathing treatments and adjust oxygen as needed.Strict I&O. Daily weights.    Problem: Fall Injury Risk  Goal: Absence of Fall and Fall-Related Injury  Outcome: Ongoing, Progressing     Problem: Adult Inpatient Plan of Care  Goal: Plan of Care Review  Outcome: Ongoing, Progressing  Goal: Patient-Specific Goal (Individualization)  Outcome: Ongoing, Progressing  Goal: Absence of Hospital-Acquired Illness or Injury  Outcome: Ongoing, Progressing  Goal: Optimal Comfort and Wellbeing  Outcome: Ongoing, Progressing  Goal: Readiness for Transition of Care  Outcome: Ongoing, Progressing  Goal: Rounds/Family Conference  Outcome: Ongoing, Progressing     Problem: Fluid Imbalance (Pneumonia)  Goal: Fluid Balance  Outcome: Ongoing, Progressing     Problem: Infection (Pneumonia)  Goal: Resolution of Infection Signs/Symptoms  Outcome: Ongoing, Progressing     Problem: Respiratory Compromise (Pneumonia)  Goal: Effective Oxygenation and Ventilation  Outcome: Ongoing, Progressing

## 2020-12-14 NOTE — PLAN OF CARE
This note also relates to the following rows which could not be included:  SpO2 - Cannot attach notes to unvalidated device data  Pulse - Cannot attach notes to unvalidated device data  Resp - Cannot attach notes to unvalidated device data       12/14/20 0852   Patient Assessment/Suction   Level of Consciousness (AVPU) alert   All Lung Fields Breath Sounds clear;diminished   PRE-TX-O2   O2 Device (Oxygen Therapy) BiPAP   $ Is the patient on Low Flow Oxygen? Yes   Pulse Oximetry Type Continuous   $ Pulse Oximetry - Multiple Charge Pulse Oximetry - Multiple   Aerosol Therapy   $ Aerosol Therapy Charges PRN treatment not required   Preset CPAP/BiPAP Settings   Mode Of Delivery BiPAP   $ CPAP/BiPAP Daily Charge BiPAP/CPAP Daily   $ Initial CPAP/BiPAP Setup? No   $ Is patient using? Yes   Size of Mask Small   Equipment Type V60   Ipap 16   EPAP (cm H2O) 8   Pressure Support (cm H2O) 8   Set Rate (Breaths/Min) 16   Patient CPAP/BiPAP Settings   Tidal Volume (mL) 588   VE Minute Ventilation (L/min) 12.2 L/min   Respiratory Evaluation   $ Care Plan Tech Time 15 min

## 2020-12-15 ENCOUNTER — PATIENT OUTREACH (OUTPATIENT)
Dept: FAMILY MEDICINE | Facility: CLINIC | Age: 75
End: 2020-12-15

## 2020-12-15 ENCOUNTER — TELEPHONE (OUTPATIENT)
Dept: FAMILY MEDICINE | Facility: CLINIC | Age: 75
End: 2020-12-15

## 2020-12-15 LAB
LABCORP MISC TEST CODE: 1453
LABCORP MISC TEST NAME: NORMAL
LABCORP MISCELLANEOUS TEST: NORMAL

## 2020-12-15 NOTE — PROGRESS NOTES
Discharge Information     Discharge Date:   12/14    Primary Discharge Diagnosis:  SOB    Discharge Summary:  Reviewed      Medication & Order Review     Were medication changes made or new medications added?   Yes    If so, has the patient filled the prescriptions?  No     Was Home Health ordered? Yes    If so, has Home Health contacted patient and/or initiated services?  Yes    Name of Home Health Agency? [facility]    Durable Medical Equipment ordered?  No     If so, has the DME provider contacted patient and delivered equipment?  N/A    Follow Up               Any problems since discharge? No    How is the patient feeling since returning home? Better     Have you set up recommended follow up appointments?  (cardiology, surgery, etc.)    Schedule Hospital Follow-up appointment within 7-14 days (preferably 7).      Notes:  Pt scheduled for HFU on 12/17/2020 12pm      Susan Arroyo

## 2020-12-15 NOTE — DISCHARGE SUMMARY
[facility]  Discharge Summary      Patient Name: Griselda Neely  MRN: [id number]  Admission Date: 12/12/2020  Hospital Length of Stay: 2 days  Discharge Date and Time:  12/14/2020 6:02 PM  Attending Physician: Yang Royal MD   Discharging Provider: Yang Royal MD  Primary Care Provider: Kalie Neely MD    HPI:   75-year-old female well known to hospitalist service presents ED w SOB. Pattient has hx atrial fibrillation, atrial thrombus ESRD HD MWF,CHF EF48% HTN calciphylaxis of both legs anemia of chronic disease   Patient was recently hospitalized 12 /1 through 12/ 4 for acute blood loss anemia which was found to be secondary to angiodysplastic lesions that required coagulation and clips being placed.  Patient was taken off her Coumadin at that time and has not restarted.  Patient states she last received dialysis yesterday and normal amount of fluid was taken off.  She returns to the emergency department today with complaints of of shortness of breath x1 day.  In the ED blood pressure was 247/114 and chest x-ray showed findings consistent with CHF.  Patient currently on BiPAP.    Hospital Course:   The patient was admitted to the hospital for workup and treatment including antihypertensives and BiPAP.  Nephrology was consulted for urgent hemodialysis.  Subsequently the patient's blood pressure improved and pulmonary edema improved.  She was weaned from BiPAP.  With treatment the patient improved. The patient underwent titration of medical regimen with addition of hydralazine. Today the patient reports feeling much improved and requesting discharge home.  The patient is medically stable.  She will follow up as outpatient with Nephrology and hemodialysis as scheduled.  The patient is in understanding and agreement with the discharge plan today.    Consults:   Consults (From admission, onward)        Status Ordering Provider     Inpatient consult to Hospitalist   Once     Provider:  DO Helder Flores EVAN L.     Inpatient consult to Nephrology  Once     Provider:  Joseph Ingram MD    Acknowledged IDA CUENCA     Inpatient consult to Nephrology  Once     Provider:  (Not yet assigned)    Acknowledged IDA CUENCA     IP consult to case management  Once     Provider:  (Not yet assigned)    Completed IDA CUENCA        Discharge diagnoses:  Hypertensive emergency with acute on chronic combined systolic and diastolic CHF exacerbation  Paroxysmal atrial fibrillation  ESRD on hemodialysis  Anemia of chronic disease  Calciphylaxis  History of atrial thrombus  Off anticoagulation secondary to GI bleed risk  Additional discharge Diagnoses:    Diagnosis Date Noted POA    PRINCIPAL PROBLEM:  Hypertensive emergency [I16.1] 12/12/2020 Yes    Congestive heart failure [I50.9] 12/12/2020 Yes     Discharge physical exam:  General:  Comfortable, nontoxic, frail  Pulmonary:  Comfortable work breathing, lungs clear  Cardiovascular:  2 radial pulses, regular rate and rhythm  Neuro:  Alert and oriented, fluent speech, in good spirits    Discharged Condition: stable    Disposition: Home or Self Care    Follow Up:  Follow-up Information     Kalie Neely MD In 2 weeks.    Specialty: Family Medicine  Contact information:  [address]  [address]  [facility] [id number] [phone]             Joseph Ingram MD In 1 week.    Specialty: Nephrology  Why: As needed  Contact information:  [address] [place], [place] [address] [id number] [phone]             Hemodialysis as scheduled.               Patient Instructions:      Diet renal     Diet Cardiac     Notify your health care provider if you experience any of the following:  persistent nausea and vomiting or diarrhea     Notify your health care provider if you experience any of the following:  temperature >100.4     Notify your health care provider if you experience any of the following:  severe  uncontrolled pain     Notify your health care provider if you experience any of the following:  difficulty breathing or increased cough     Activity as tolerated       Pending Diagnostic Studies:     None         Medications:  Reconciled Home Medications:      Medication List      START taking these medications    hydrALAZINE 50 MG tablet  Commonly known as: APRESOLINE  Take 1 tablet (50 mg total) by mouth every 8 (eight) hours.        CONTINUE taking these medications    albuterol sulfate 2.5 mg/0.5 mL Nebu  Take 2.5 mg by nebulization every 4 (four) hours as needed. Rescue     calcium acetate(phosphat bind) 667 mg capsule  Commonly known as: PHOSLO  Take 667 mg by mouth once daily.     diltiaZEM 240 MG Cdcr  Commonly known as: DILACOR XR  Take 1 capsule (240 mg total) by mouth once daily.     isosorbide mononitrate 30 MG 24 hr tablet  Commonly known as: IMDUR  Take 30 mg by mouth once daily.     levalbuterol 0.63 mg/3 mL nebulizer solution  Commonly known as: XOPENEX  Take 3 mLs (0.63 mg total) by nebulization every 8 (eight) hours as needed for Wheezing or Shortness of Breath. Rescue     losartan 25 MG tablet  Commonly known as: COZAAR  Take 25 mg by mouth once daily.     magnesium oxide 400 mg (241.3 mg magnesium) tablet  Commonly known as: MAG-OX  Take 1 tablet by mouth once daily     metoprolol succinate 25 MG 24 hr tablet  Commonly known as: TOPROL-XL  Take 1 tablet by mouth nightly     nicotine 14 mg/24 hr  Commonly known as: NICODERM CQ  Place 1 patch onto the skin once daily.     ondansetron 4 MG Tbdl  Commonly known as: ZOFRAN-ODT  Take 4 mg by mouth every 6 (six) hours as needed.     oxyCODONE-acetaminophen 5-325 mg per tablet  Commonly known as: PERCOCET  Take 1 tablet by mouth every 6 (six) hours as needed for Pain.     pantoprazole 40 MG tablet  Commonly known as: PROTONIX  Take 1 tablet (40 mg total) by mouth once daily.     JENNIFER-KODAK ORAL  Take 1 tablet by mouth once daily.     traMADoL 50 mg  tablet  Commonly known as: ULTRAM  Take 50 mg by mouth 3 (three) times a week.     wheelchair An  1 Device by Misc.(Non-Drug; Combo Route) route as needed (needed for transport).     zinc sulfate 220 mg Tab tablet  Take 220 mg by mouth every other day.            Indwelling Lines/Drains at time of discharge:   Lines/Drains/Airways     Drain                 Hemodialysis AV Fistula 08/08/19 0214 Right upper arm 494 days                Time spent on the discharge of patient: 26 minutes  Patient was seen and examined on the date of discharge and determined to be suitable for discharge.         Yang Royal MD  Department of Hospital Medicine  [facility]

## 2020-12-15 NOTE — PLAN OF CARE
Problem: Fall Injury Risk  Goal: Absence of Fall and Fall-Related Injury  12/14/2020 1819 by Tracey Malloy RN  Outcome: Met  12/14/2020 1334 by Tracey Malloy RN  Outcome: Ongoing, Progressing     Problem: Adult Inpatient Plan of Care  Goal: Plan of Care Review  12/14/2020 1819 by Tracey Malloy RN  Outcome: Met  12/14/2020 1334 by Tracey Malloy RN  Outcome: Ongoing, Progressing  Goal: Patient-Specific Goal (Individualization)  12/14/2020 1819 by Tracey Malloy RN  Outcome: Met  12/14/2020 1334 by Tracey Malloy RN  Outcome: Ongoing, Progressing  Goal: Absence of Hospital-Acquired Illness or Injury  12/14/2020 1819 by Tracey Malloy RN  Outcome: Met  12/14/2020 1334 by Tracey Malloy RN  Outcome: Ongoing, Progressing  Goal: Optimal Comfort and Wellbeing  12/14/2020 1819 by Tracey Malloy RN  Outcome: Met  12/14/2020 1334 by Tracey Malloy RN  Outcome: Ongoing, Progressing  Goal: Readiness for Transition of Care  12/14/2020 1819 by Tracey Malloy RN  Outcome: Met  12/14/2020 1334 by Tracey Malloy RN  Outcome: Ongoing, Progressing  Goal: Rounds/Family Conference  12/14/2020 1819 by Tracey Malloy RN  Outcome: Met  12/14/2020 1334 by Tracey Malloy RN  Outcome: Ongoing, Progressing

## 2020-12-17 ENCOUNTER — OFFICE VISIT (OUTPATIENT)
Dept: FAMILY MEDICINE | Facility: CLINIC | Age: 75
End: 2020-12-17
Payer: MEDICARE

## 2020-12-17 ENCOUNTER — TELEPHONE (OUTPATIENT)
Dept: FAMILY MEDICINE | Facility: CLINIC | Age: 75
End: 2020-12-17

## 2020-12-17 VITALS — SYSTOLIC BLOOD PRESSURE: 138 MMHG | DIASTOLIC BLOOD PRESSURE: 72 MMHG | HEART RATE: 70 BPM

## 2020-12-17 DIAGNOSIS — I48.20 CHRONIC A-FIB: ICD-10-CM

## 2020-12-17 DIAGNOSIS — Z53.20 SCREENING MAMMOGRAPHY DECLINED: ICD-10-CM

## 2020-12-17 DIAGNOSIS — Z09 HOSPITAL DISCHARGE FOLLOW-UP: Primary | ICD-10-CM

## 2020-12-17 DIAGNOSIS — N18.6 ESRD (END STAGE RENAL DISEASE): ICD-10-CM

## 2020-12-17 DIAGNOSIS — Z79.01 LONG TERM CURRENT USE OF ANTICOAGULANT: ICD-10-CM

## 2020-12-17 DIAGNOSIS — Z28.21 PNEUMOCOCCAL VACCINATION DECLINED: ICD-10-CM

## 2020-12-17 DIAGNOSIS — I10 ACCELERATED HYPERTENSION: ICD-10-CM

## 2020-12-17 DIAGNOSIS — R53.81 PHYSICAL DEBILITY: ICD-10-CM

## 2020-12-17 PROCEDURE — 99496 TRANSJ CARE MGMT HIGH F2F 7D: CPT | Mod: S$GLB,,, | Performed by: FAMILY MEDICINE

## 2020-12-17 PROCEDURE — 3288F FALL RISK ASSESSMENT DOCD: CPT | Mod: S$GLB,,, | Performed by: FAMILY MEDICINE

## 2020-12-17 PROCEDURE — 99496 TRANSITIONAL CARE MANAGE SERVICE 7 DAY DISCHARGE: ICD-10-PCS | Mod: S$GLB,,, | Performed by: FAMILY MEDICINE

## 2020-12-17 PROCEDURE — 3288F PR FALLS RISK ASSESSMENT DOCUMENTED: ICD-10-PCS | Mod: S$GLB,,, | Performed by: FAMILY MEDICINE

## 2020-12-17 PROCEDURE — 1101F PT FALLS ASSESS-DOCD LE1/YR: CPT | Mod: S$GLB,,, | Performed by: FAMILY MEDICINE

## 2020-12-17 PROCEDURE — 1101F PR PT FALLS ASSESS DOC 0-1 FALLS W/OUT INJ PAST YR: ICD-10-PCS | Mod: S$GLB,,, | Performed by: FAMILY MEDICINE

## 2020-12-17 RX ORDER — CLONIDINE HYDROCHLORIDE 0.1 MG/1
0.1 TABLET ORAL 2 TIMES DAILY PRN
Qty: 90 TABLET | Refills: 1 | Status: SHIPPED | OUTPATIENT
Start: 2020-12-17 | End: 2020-12-31 | Stop reason: SDUPTHER

## 2020-12-17 NOTE — PROGRESS NOTES
SUBJECTIVE:    Patient ID: Griselda Neely is a 75 y.o. female.    Chief Complaint: HFU, transport w/c request from [facility], mammogram order / dexa order, and tdap/pneumovax 23 due per links    HPI    Admit Date: 12/12/20   Discharge Date: 12/14/20  Discharge Facility: Hospital    Medication Reconciliation:  Medications changed/added/deleted.   New Prescriptions filled after discharge: yes  Discharge summary reviewed:  yes  Pending test results at discharge reviewed:   not applicable  Follow up appointments scheduled:  yes   with Cardiology   Follow up labs/tests ordered:   no  Home Health ordered on discharge:   yes  Home Health company name: [facility]  DME ordered at discharge:   yes  How patient is feeling since discharge from the hospital?  At baseline             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    both legs    CHF (congestive heart failure)     Encounter for blood transfusion 03/2016    Gout     Hemodialysis access, AV graft     Hypertension     Mitral valve regurgitation     Osteoarthritis     Pancreatitis     Peripheral vascular disease     Pneumonia 09/09/2017    Renal failure      Past Surgical History:   Procedure Laterality Date    ACHILLES TENDON SURGERY Right     ANGIOGRAPHY OF ARTERIOVENOUS SHUNT Right 1/7/2020    Procedure: Fistulogram with Possible Intervention;  Surgeon: Joseph Loyola MD;  Location: [facility] CATH/EP LAB;  Service: Cardiology;  Laterality: Right;    ANGIOGRAPHY OF LOWER EXTREMITY Left 3/18/2020    Procedure: Angiogram Extremity Unilateral;  Surgeon: Ali Khoobehi, MD;  Location: [facility] CATH/EP LAB;  Service: Cardiology;  Laterality: Left;    APPENDECTOMY      CARDIAC CATHETERIZATION  07/03/2017    CHOLECYSTECTOMY      COLONOSCOPY Left 10/4/2020    Procedure: COLONOSCOPY;  Surgeon: Jordan Kilpatrick MD;  Location: [facility] ENDO;  Service: Endoscopy;  Laterality: Left;     ESOPHAGOGASTRODUODENOSCOPY Left 8/17/2020    Procedure: EGD (ESOPHAGOGASTRODUODENOSCOPY);  Surgeon: Talat Trevizo MD;  Location: [facility] ENDO;  Service: Endoscopy;  Laterality: Left;    ESOPHAGOGASTRODUODENOSCOPY Left 10/2/2020    Procedure: EGD (ESOPHAGOGASTRODUODENOSCOPY);  Surgeon: Talat Trevizo MD;  Location: [facility] ENDO;  Service: Endoscopy;  Laterality: Left;    heal surgery Right     HYSTERECTOMY      INSERTION OF STENT INTO PERIPHERAL VESSEL N/A 1/7/2020    Procedure: INSERTION, STENT, VESSEL, PERIPHERAL;  Surgeon: Joseph Loyola MD;  Location: [facility] CATH/EP LAB;  Service: Cardiology;  Laterality: N/A;    MITRAL VALVE REPLACEMENT      PARATHYROIDECTOMY      PARATHYROIDECTOMY  07/13/2017    PERCUTANEOUS TRANSLUMINAL ANGIOPLASTY OF ARTERIOVENOUS FISTULA N/A 1/7/2020    Procedure: PTA, AV FISTULA;  Surgeon: Joseph Loyola MD;  Location: [facility] CATH/EP LAB;  Service: Cardiology;  Laterality: N/A;    PERITONEAL CATHETER INSERTION      RENAL BIOPSY      SMALL BOWEL ENTEROSCOPY N/A 12/2/2020    Procedure: ENTEROSCOPY;  Surgeon: Sudheer Brown III, MD;  Location: [facility] ENDO;  Service: Endoscopy;  Laterality: N/A;    vocal cord nodule      WOUND DEBRIDEMENT Left 7/13/2020    Procedure: DEBRIDEMENT, WOUND;  Surgeon: Carlos Elam III, MD;  Location: [facility] OR;  Service: General;  Laterality: Left;     Family History   Problem Relation Age of Onset    Heart disease Sister     Early death Sister         heart as baby    Heart disease Maternal Grandfather     Diabetes Mother     Hypertension Mother     Heart failure Mother     Heart disease Mother     Arthritis Mother     Diabetes Father     Early death Sister         infant       Marital Status:   Alcohol History:  reports no history of alcohol use.  Tobacco History:  reports that she has been smoking cigarettes. She has a 22.50 pack-year smoking history. She has never used smokeless tobacco.  Drug History:  reports no history of  drug use.    Review of patient's allergies indicates:  No Known Allergies    Current Outpatient Medications:     albuterol sulfate 2.5 mg/0.5 mL Nebu, Take 2.5 mg by nebulization every 4 (four) hours as needed. Rescue, Disp: 20 each, Rfl: 1    calcium acetate (PHOSLO) 667 mg capsule, Take 667 mg by mouth once daily. , Disp: , Rfl:     diltiaZEM (DILACOR XR) 240 MG CDCR, Take 1 capsule (240 mg total) by mouth once daily., Disp: 30 capsule, Rfl: 11    folic acid/vit B complex and C (JENNIFER-KODAK ORAL), Take 1 tablet by mouth once daily., Disp: , Rfl:     isosorbide mononitrate (IMDUR) 30 MG 24 hr tablet, Take 30 mg by mouth once daily., Disp: , Rfl:     losartan (COZAAR) 25 MG tablet, Take 25 mg by mouth once daily., Disp: , Rfl:     magnesium oxide (MAG-OX) 400 mg (241.3 mg magnesium) tablet, Take 1 tablet by mouth once daily (Patient taking differently: Take 400 mg by mouth once daily. ), Disp: 30 tablet, Rfl: 0    metoprolol succinate (TOPROL-XL) 25 MG 24 hr tablet, Take 1 tablet by mouth nightly, Disp: 30 tablet, Rfl: 2    ondansetron (ZOFRAN-ODT) 4 MG TbDL, Take 4 mg by mouth every 6 (six) hours as needed., Disp: , Rfl:     oxyCODONE-acetaminophen (PERCOCET) 5-325 mg per tablet, Take 1 tablet by mouth every 6 (six) hours as needed for Pain., Disp: , Rfl:     traMADoL (ULTRAM) 50 mg tablet, Take 50 mg by mouth 3 (three) times a week., Disp: , Rfl:     wheelchair An, 1 Device by Misc.(Non-Drug; Combo Route) route as needed (needed for transport)., Disp: , Rfl: 0    zinc sulfate 220 mg Tab tablet, Take 220 mg by mouth every other day., Disp: , Rfl:     albuterol (PROVENTIL) 2.5 mg /3 mL (0.083 %) nebulizer solution, USE 1 VIAL IN NEBULIZER EVERY 4 HOURS AS NEEDED (RESCUE), Disp: , Rfl:     cloNIDine (CATAPRES) 0.1 MG tablet, Take 1 tablet (0.1 mg total) by mouth 2 (two) times daily as needed (for BP >160/90)., Disp: 90 tablet, Rfl: 1    diltiaZEM (CARTIA XT) 180 MG 24 hr capsule, Cartia  mg  capsule,extended release, Disp: , Rfl:     hydrALAZINE (APRESOLINE) 50 MG tablet, Take 1 tablet (50 mg total) by mouth every 8 (eight) hours., Disp: 90 tablet, Rfl: 11    levalbuterol (XOPENEX) 0.63 mg/3 mL nebulizer solution, Take 3 mLs (0.63 mg total) by nebulization every 8 (eight) hours as needed for Wheezing or Shortness of Breath. Rescue, Disp: 1 Box, Rfl: 4    nicotine (NICODERM CQ) 14 mg/24 hr, Place 1 patch onto the skin once daily., Disp: 30 patch, Rfl: 0    pantoprazole (PROTONIX) 40 MG tablet, Take 1 tablet (40 mg total) by mouth once daily., Disp: 90 tablet, Rfl: 0    warfarin (COUMADIN) 1 MG tablet, warfarin 1 mg tablet, Disp: , Rfl:     warfarin (COUMADIN) 5 MG tablet, warfarin 5 mg tablet, Disp: , Rfl:     Review of Systems     Objective:      Vitals:    12/17/20 1158   BP: 138/72   Pulse: 70     Physical Exam    Assessment:       1. Hospital discharge follow-up    2. Pneumococcal vaccination declined    3. Screening mammography declined    4. Chronic a-fib    5. ESRD (end stage renal disease) on HD M,W, F    6. Accelerated hypertension    7. Long term current use of anticoagulant    8. Physical debility         Plan:       Hospital discharge follow-up    Pneumococcal vaccination declined    Screening mammography declined    Chronic a-fib    ESRD (end stage renal disease) on HD M,W, F    Accelerated hypertension  -     Discontinue: cloNIDine (CATAPRES) 0.1 MG tablet; Take 1 tablet (0.1 mg total) by mouth 2 (two) times daily as needed (for BP >160/90).  Dispense: 90 tablet; Refill: 1    Long term current use of anticoagulant    Physical debility  -     WHEELCHAIR FOR HOME USE      Follow up keep current.

## 2020-12-17 NOTE — TELEPHONE ENCOUNTER
----- Message from Margarette Briscoe sent at 12/17/2020  8:55 AM CST -----  Nurse  with [facility] calling they are requesting a referral/auth for a transport wheelchair.  To the clinical care teamPhone # [phone],  they want to get it from [place] [facility] on [facility].

## 2020-12-18 ENCOUNTER — DOCUMENT SCAN (OUTPATIENT)
Dept: HOME HEALTH SERVICES | Facility: HOSPITAL | Age: 75
End: 2020-12-18

## 2020-12-21 ENCOUNTER — DOCUMENT SCAN (OUTPATIENT)
Dept: HOME HEALTH SERVICES | Facility: HOSPITAL | Age: 75
End: 2020-12-21

## 2020-12-23 ENCOUNTER — EXTERNAL HOME HEALTH (OUTPATIENT)
Dept: HOME HEALTH SERVICES | Facility: HOSPITAL | Age: 75
End: 2020-12-23

## 2020-12-28 ENCOUNTER — DOCUMENT SCAN (OUTPATIENT)
Dept: HOME HEALTH SERVICES | Facility: HOSPITAL | Age: 75
End: 2020-12-28

## 2020-12-29 ENCOUNTER — TELEPHONE (OUTPATIENT)
Dept: CARDIOLOGY | Facility: CLINIC | Age: 75
End: 2020-12-29

## 2020-12-29 ENCOUNTER — DOCUMENT SCAN (OUTPATIENT)
Dept: HOME HEALTH SERVICES | Facility: HOSPITAL | Age: 75
End: 2020-12-29

## 2020-12-29 ENCOUNTER — DOCUMENT SCAN (OUTPATIENT)
Dept: HOME HEALTH SERVICES | Facility: HOSPITAL | Age: 75
End: 2020-12-29
Payer: MEDICARE

## 2020-12-29 ENCOUNTER — OFFICE VISIT (OUTPATIENT)
Dept: PODIATRY | Facility: CLINIC | Age: 75
End: 2020-12-29
Payer: MEDICARE

## 2020-12-29 VITALS — BODY MASS INDEX: 23.66 KG/M2 | WEIGHT: 142 LBS | HEIGHT: 65 IN

## 2020-12-29 DIAGNOSIS — I70.90 ARTERIAL OCCLUSION DUE TO ARTERIOSCLEROSIS: ICD-10-CM

## 2020-12-29 DIAGNOSIS — B35.1 ONYCHOMYCOSIS DUE TO DERMATOPHYTE: ICD-10-CM

## 2020-12-29 DIAGNOSIS — L60.2 OG (ONYCHOGRYPHOSIS): ICD-10-CM

## 2020-12-29 DIAGNOSIS — I73.9 PVD (PERIPHERAL VASCULAR DISEASE): Primary | ICD-10-CM

## 2020-12-29 DIAGNOSIS — M79.674 TOE PAIN, BILATERAL: ICD-10-CM

## 2020-12-29 DIAGNOSIS — R26.2 DIFFICULTY WALKING: ICD-10-CM

## 2020-12-29 DIAGNOSIS — E83.59 CALCIPHYLAXIS: ICD-10-CM

## 2020-12-29 DIAGNOSIS — M79.675 TOE PAIN, BILATERAL: ICD-10-CM

## 2020-12-29 PROCEDURE — 1101F PR PT FALLS ASSESS DOC 0-1 FALLS W/OUT INJ PAST YR: ICD-10-PCS | Mod: CPTII,S$GLB,, | Performed by: PODIATRIST

## 2020-12-29 PROCEDURE — 3008F PR BODY MASS INDEX (BMI) DOCUMENTED: ICD-10-PCS | Mod: CPTII,S$GLB,, | Performed by: PODIATRIST

## 2020-12-29 PROCEDURE — 11721 DEBRIDE NAIL 6 OR MORE: CPT | Mod: S$GLB,,, | Performed by: PODIATRIST

## 2020-12-29 PROCEDURE — 11721 PR DEBRIDEMENT OF NAILS, 6 OR MORE: ICD-10-PCS | Mod: S$GLB,,, | Performed by: PODIATRIST

## 2020-12-29 PROCEDURE — 1125F AMNT PAIN NOTED PAIN PRSNT: CPT | Mod: S$GLB,,, | Performed by: PODIATRIST

## 2020-12-29 PROCEDURE — 1159F PR MEDICATION LIST DOCUMENTED IN MEDICAL RECORD: ICD-10-PCS | Mod: S$GLB,,, | Performed by: PODIATRIST

## 2020-12-29 PROCEDURE — 99203 PR OFFICE/OUTPT VISIT, NEW, LEVL III, 30-44 MIN: ICD-10-PCS | Mod: 25,S$GLB,, | Performed by: PODIATRIST

## 2020-12-29 PROCEDURE — 1125F PR PAIN SEVERITY QUANTIFIED, PAIN PRESENT: ICD-10-PCS | Mod: S$GLB,,, | Performed by: PODIATRIST

## 2020-12-29 PROCEDURE — 99203 OFFICE O/P NEW LOW 30 MIN: CPT | Mod: 25,S$GLB,, | Performed by: PODIATRIST

## 2020-12-29 PROCEDURE — 3008F BODY MASS INDEX DOCD: CPT | Mod: CPTII,S$GLB,, | Performed by: PODIATRIST

## 2020-12-29 PROCEDURE — 1159F MED LIST DOCD IN RCRD: CPT | Mod: S$GLB,,, | Performed by: PODIATRIST

## 2020-12-29 PROCEDURE — 3288F FALL RISK ASSESSMENT DOCD: CPT | Mod: CPTII,S$GLB,, | Performed by: PODIATRIST

## 2020-12-29 PROCEDURE — 3288F PR FALLS RISK ASSESSMENT DOCUMENTED: ICD-10-PCS | Mod: CPTII,S$GLB,, | Performed by: PODIATRIST

## 2020-12-29 PROCEDURE — 1101F PT FALLS ASSESS-DOCD LE1/YR: CPT | Mod: CPTII,S$GLB,, | Performed by: PODIATRIST

## 2020-12-29 RX ORDER — WARFARIN 1 MG/1
TABLET ORAL
COMMUNITY
End: 2021-01-01

## 2020-12-29 RX ORDER — WARFARIN SODIUM 5 MG/1
TABLET ORAL
COMMUNITY
End: 2021-01-11 | Stop reason: SDUPTHER

## 2020-12-29 RX ORDER — DILTIAZEM HYDROCHLORIDE 180 MG/1
CAPSULE, COATED, EXTENDED RELEASE ORAL
COMMUNITY
End: 2021-03-16 | Stop reason: ALTCHOICE

## 2020-12-29 RX ORDER — ALBUTEROL SULFATE 90 UG/1
1 AEROSOL, METERED RESPIRATORY (INHALATION) EVERY 4 HOURS PRN
COMMUNITY
Start: 2020-12-11

## 2020-12-29 NOTE — PROGRESS NOTES
"  [address]  [place] [address]  Phone: [phone]   [phone]    Patient's PCP:Kalie Neely MD  Referring Provider: Aaareferral Self    Subjective:      Chief Complaint:: Nail Care (left great toenail pain-thick )    HPI  Griselda Neely is a 75 y.o. female who presents with a complaint of painful left first toenail  lasting for awhile . Onset of symptoms none and reports no trauma.  Current symptoms include pain.  Aggravating factors are closed toe shoe and to touch. Symptoms have progressed. Treatment to date have included epson salt soaks.        Vitals:    12/29/20 1008   Weight: 64.4 kg (142 lb)   Height: 5' 5" (1.651 m)   PainSc: 10-Worst pain ever      Shoe Size: 9-9.5    Past Surgical History:   Procedure Laterality Date    ACHILLES TENDON SURGERY Right     ANGIOGRAPHY OF ARTERIOVENOUS SHUNT Right 1/7/2020    Procedure: Fistulogram with Possible Intervention;  Surgeon: Joseph Loyola MD;  Location: [facility] CATH/EP LAB;  Service: Cardiology;  Laterality: Right;    ANGIOGRAPHY OF LOWER EXTREMITY Left 3/18/2020    Procedure: Angiogram Extremity Unilateral;  Surgeon: Ali Khoobehi, MD;  Location: [facility] CATH/EP LAB;  Service: Cardiology;  Laterality: Left;    APPENDECTOMY      CARDIAC CATHETERIZATION  07/03/2017    CHOLECYSTECTOMY      COLONOSCOPY Left 10/4/2020    Procedure: COLONOSCOPY;  Surgeon: Jordan Kilpatrick MD;  Location: [facility];  Service: Endoscopy;  Laterality: Left;    ESOPHAGOGASTRODUODENOSCOPY Left 8/17/2020    Procedure: EGD (ESOPHAGOGASTRODUODENOSCOPY);  Surgeon: Talat Trevizo MD;  Location: [facility] ENDO;  Service: Endoscopy;  Laterality: Left;    ESOPHAGOGASTRODUODENOSCOPY Left 10/2/2020    Procedure: EGD (ESOPHAGOGASTRODUODENOSCOPY);  Surgeon: Talat Trevizo MD;  Location: [facility];  Service: Endoscopy;  Laterality: Left;    heal surgery Right     HYSTERECTOMY      INSERTION OF STENT INTO PERIPHERAL VESSEL N/A 1/7/2020    Procedure: " INSERTION, STENT, VESSEL, PERIPHERAL;  Surgeon: Joseph Loyola MD;  Location: [facility] CATH/EP LAB;  Service: Cardiology;  Laterality: N/A;    MITRAL VALVE REPLACEMENT      PARATHYROIDECTOMY      PARATHYROIDECTOMY  07/13/2017    PERCUTANEOUS TRANSLUMINAL ANGIOPLASTY OF ARTERIOVENOUS FISTULA N/A 1/7/2020    Procedure: PTA, AV FISTULA;  Surgeon: Joseph Loyola MD;  Location: [facility] CATH/EP LAB;  Service: Cardiology;  Laterality: N/A;    PERITONEAL CATHETER INSERTION      RENAL BIOPSY      SMALL BOWEL ENTEROSCOPY N/A 12/2/2020    Procedure: ENTEROSCOPY;  Surgeon: Sudheer Brown III, MD;  Location: [facility] ENDO;  Service: Endoscopy;  Laterality: N/A;    vocal cord nodule      WOUND DEBRIDEMENT Left 7/13/2020    Procedure: DEBRIDEMENT, WOUND;  Surgeon: Carlos Elam III, MD;  Location: [facility] OR;  Service: General;  Laterality: Left;     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    both legs    CHF (congestive heart failure)     Encounter for blood transfusion 03/2016    Gout     Hemodialysis access, AV graft     Hypertension     Mitral valve regurgitation     Osteoarthritis     Pancreatitis     Peripheral vascular disease     Pneumonia 09/09/2017    Renal failure      Family History   Problem Relation Age of Onset    Heart disease Sister     Early death Sister         heart as baby    Heart disease Maternal Grandfather     Diabetes Mother     Hypertension Mother     Heart failure Mother     Heart disease Mother     Arthritis Mother     Diabetes Father     Early death Sister         infant        Social History:   Marital Status:   Alcohol History:  reports no history of alcohol use.  Tobacco History:  reports that she has been smoking cigarettes. She has a 22.50 pack-year smoking history. She has never used smokeless tobacco.  Drug History:  reports no history of drug use.    Review of patient's allergies indicates:  No  Known Allergies    Current Outpatient Medications   Medication Sig Dispense Refill    albuterol (PROVENTIL) 2.5 mg /3 mL (0.083 %) nebulizer solution USE 1 VIAL IN NEBULIZER EVERY 4 HOURS AS NEEDED (RESCUE)      albuterol sulfate 2.5 mg/0.5 mL Nebu Take 2.5 mg by nebulization every 4 (four) hours as needed. Rescue 20 each 1    calcium acetate (PHOSLO) 667 mg capsule Take 667 mg by mouth once daily.       cloNIDine (CATAPRES) 0.1 MG tablet Take 1 tablet (0.1 mg total) by mouth 2 (two) times daily as needed (for BP >160/90). 90 tablet 1    diltiaZEM (CARTIA XT) 180 MG 24 hr capsule Cartia  mg capsule,extended release      diltiaZEM (DILACOR XR) 240 MG CDCR Take 1 capsule (240 mg total) by mouth once daily. 30 capsule 11    folic acid/vit B complex and C (JENNIFER-KODAK ORAL) Take 1 tablet by mouth once daily.      hydrALAZINE (APRESOLINE) 50 MG tablet Take 1 tablet (50 mg total) by mouth every 8 (eight) hours. 90 tablet 11    isosorbide mononitrate (IMDUR) 30 MG 24 hr tablet Take 30 mg by mouth once daily.      levalbuterol (XOPENEX) 0.63 mg/3 mL nebulizer solution Take 3 mLs (0.63 mg total) by nebulization every 8 (eight) hours as needed for Wheezing or Shortness of Breath. Rescue 1 Box 4    losartan (COZAAR) 25 MG tablet Take 25 mg by mouth once daily.      magnesium oxide (MAG-OX) 400 mg (241.3 mg magnesium) tablet Take 1 tablet by mouth once daily (Patient taking differently: Take 400 mg by mouth once daily. ) 30 tablet 0    metoprolol succinate (TOPROL-XL) 25 MG 24 hr tablet Take 1 tablet by mouth nightly 30 tablet 2    nicotine (NICODERM CQ) 14 mg/24 hr Place 1 patch onto the skin once daily. 30 patch 0    ondansetron (ZOFRAN-ODT) 4 MG TbDL Take 4 mg by mouth every 6 (six) hours as needed.      oxyCODONE-acetaminophen (PERCOCET) 5-325 mg per tablet Take 1 tablet by mouth every 6 (six) hours as needed for Pain.      pantoprazole (PROTONIX) 40 MG tablet Take 1 tablet (40 mg total) by mouth once  daily. 90 tablet 0    traMADoL (ULTRAM) 50 mg tablet Take 50 mg by mouth 3 (three) times a week.      warfarin (COUMADIN) 1 MG tablet warfarin 1 mg tablet      warfarin (COUMADIN) 5 MG tablet warfarin 5 mg tablet      wheelchair An 1 Device by Misc.(Non-Drug; Combo Route) route as needed (needed for transport).  0    zinc sulfate 220 mg Tab tablet Take 220 mg by mouth every other day.       No current facility-administered medications for this visit.        Review of Systems      Objective:        Physical Exam:   Foot Exam    General  General Appearance: appears stated age and healthy   Orientation: alert and oriented to person, place, and time   Affect: appropriate   Gait: antalgic   Assistance: walker use       Right Foot/Ankle     Inspection and Palpation  Skin Exam: abnormal color (Hyper pigmented skin with some calciphylaxis); (Thick deformed toenails all toes)    Neurovascular  Dorsalis pedis: absent  Posterior tibial: absent  Saphenous nerve sensation: normal  Tibial nerve sensation: normal  Superficial peroneal nerve sensation: normal  Deep peroneal nerve sensation: normal  Sural nerve sensation: normal      Left Foot/Ankle      Inspection and Palpation  Skin Exam: abnormal color (Hyper pigmented skin with some calciphylaxis); (Thick deformed toenails all toes)    Neurovascular  Dorsalis pedis: absent  Posterior tibial: absent  Saphenous nerve sensation: normal  Tibial nerve sensation: normal  Superficial peroneal nerve sensation: normal  Deep peroneal nerve sensation: normal  Sural nerve sensation: normal          Physical Exam   Cardiovascular:   Pulses:       Dorsalis pedis pulses are absent on the right side and absent on the left side.        Posterior tibial pulses are absent on the right side and absent on the left side.       Imaging:            Assessment:       1. PVD (peripheral vascular disease)    2. OG (onychogryphosis)    3. Toe pain, bilateral    4. Onychomycosis due to dermatophyte     5. Difficulty walking    6. Calciphylaxis    7. Peripheral vascular disease now with left lower extremity occlusion      Plan:   PVD (peripheral vascular disease)    OG (onychogryphosis)    Toe pain, bilateral    Onychomycosis due to dermatophyte    Difficulty walking    Calciphylaxis    Peripheral vascular disease now with left lower extremity occlusion     1. Today evaluate patient discuss with her the clinical findings of severe peripheral vascular disease.  I also discussed with her the fungal infection of her toenails and exposed skin at the tip of the toes which are not ulcerated today but very close to being ulcerated.  She has use shoes that have deep toe box wide spaced she does rub on the tips of her toes.  She has continue follow-up with vascular surgery for calciphylaxis.  The patient is high risk of loss of limb.  2.Utilizing sterile nail nippers and electric , I aggressively debrided nails 1-5 bilaterally down to nail beds to thin the nail plates. Pt. tolerated well. No blood was drawn.  3.  Return as needed  No follow-ups on file.    Procedures  No notes on file       Counseling:     I provided patient education verbally regarding:   Patient diagnosis, treatment options, as well as alternatives, risks, and benefits.     This note was created using Dragon voice recognition software that occasionally misinterpreted phrases or words.

## 2020-12-29 NOTE — TELEPHONE ENCOUNTER
INR 1.2 yest,-- 2.5mg qd.  1.4 today . S/w [facility] nurse & advised no change. Re-check Thursday 12/31/20

## 2020-12-29 NOTE — TELEPHONE ENCOUNTER
----- Message from Clotilde Anthony sent at 12/29/2020  8:44 AM CST -----  Regarding: INR  # home health said they have not heard indirak from when she called in a INR report on patient she need a call back to see if changes needs to made or not [phone] Domitila

## 2020-12-31 DIAGNOSIS — I10 ACCELERATED HYPERTENSION: ICD-10-CM

## 2020-12-31 DIAGNOSIS — J44.9 CHRONIC OBSTRUCTIVE PULMONARY DISEASE, UNSPECIFIED COPD TYPE: ICD-10-CM

## 2020-12-31 RX ORDER — LEVALBUTEROL INHALATION SOLUTION 0.63 MG/3ML
1 SOLUTION RESPIRATORY (INHALATION) EVERY 8 HOURS PRN
Qty: 1 BOX | Refills: 4 | Status: ON HOLD | OUTPATIENT
Start: 2020-12-31 | End: 2022-01-01 | Stop reason: HOSPADM

## 2020-12-31 RX ORDER — CLONIDINE HYDROCHLORIDE 0.1 MG/1
0.1 TABLET ORAL 2 TIMES DAILY PRN
Qty: 90 TABLET | Refills: 1 | Status: SHIPPED | OUTPATIENT
Start: 2020-12-31 | End: 2021-01-01

## 2020-12-31 RX ORDER — IBUPROFEN 200 MG
1 TABLET ORAL DAILY
Qty: 30 PATCH | Refills: 0 | Status: SHIPPED | OUTPATIENT
Start: 2020-12-31 | End: 2021-03-16

## 2020-12-31 RX ORDER — HYDRALAZINE HYDROCHLORIDE 50 MG/1
50 TABLET, FILM COATED ORAL EVERY 8 HOURS
Qty: 90 TABLET | Refills: 11 | Status: ON HOLD | OUTPATIENT
Start: 2020-12-31 | End: 2022-01-01 | Stop reason: HOSPADM

## 2021-01-01 ENCOUNTER — ANTI-COAG VISIT (OUTPATIENT)
Dept: CARDIOLOGY | Facility: CLINIC | Age: 76
End: 2021-01-01
Payer: MEDICARE

## 2021-01-01 ENCOUNTER — EXTERNAL HOME HEALTH (OUTPATIENT)
Dept: HOME HEALTH SERVICES | Facility: HOSPITAL | Age: 76
End: 2021-01-01

## 2021-01-01 ENCOUNTER — TELEPHONE (OUTPATIENT)
Dept: FAMILY MEDICINE | Facility: CLINIC | Age: 76
End: 2021-01-01

## 2021-01-01 ENCOUNTER — TELEPHONE (OUTPATIENT)
Dept: CARDIOLOGY | Facility: CLINIC | Age: 76
End: 2021-01-01

## 2021-01-01 ENCOUNTER — OFFICE VISIT (OUTPATIENT)
Dept: PODIATRY | Facility: CLINIC | Age: 76
End: 2021-01-01
Payer: MEDICARE

## 2021-01-01 ENCOUNTER — OFFICE VISIT (OUTPATIENT)
Dept: CARDIOLOGY | Facility: CLINIC | Age: 76
End: 2021-01-01
Payer: MEDICARE

## 2021-01-01 ENCOUNTER — TELEPHONE (OUTPATIENT)
Dept: FAMILY MEDICINE | Facility: CLINIC | Age: 76
End: 2021-01-01
Payer: MEDICARE

## 2021-01-01 ENCOUNTER — LAB VISIT (OUTPATIENT)
Dept: LAB | Facility: HOSPITAL | Age: 76
End: 2021-01-01
Attending: INTERNAL MEDICINE
Payer: MEDICARE

## 2021-01-01 ENCOUNTER — OFFICE VISIT (OUTPATIENT)
Dept: FAMILY MEDICINE | Facility: CLINIC | Age: 76
End: 2021-01-01
Payer: MEDICARE

## 2021-01-01 ENCOUNTER — DOCUMENT SCAN (OUTPATIENT)
Dept: HOME HEALTH SERVICES | Facility: HOSPITAL | Age: 76
End: 2021-01-01
Payer: MEDICARE

## 2021-01-01 ENCOUNTER — ANTI-COAG VISIT (OUTPATIENT)
Dept: CARDIOLOGY | Facility: CLINIC | Age: 76
End: 2021-01-01

## 2021-01-01 ENCOUNTER — EXTERNAL HOME HEALTH (OUTPATIENT)
Dept: HOME HEALTH SERVICES | Facility: HOSPITAL | Age: 76
End: 2021-01-01
Payer: MEDICARE

## 2021-01-01 ENCOUNTER — HOSPITAL ENCOUNTER (OUTPATIENT)
Facility: HOSPITAL | Age: 76
Discharge: HOME | End: 2021-06-30
Attending: EMERGENCY MEDICINE | Admitting: STUDENT IN AN ORGANIZED HEALTH CARE EDUCATION/TRAINING PROGRAM
Payer: MEDICARE

## 2021-01-01 ENCOUNTER — HOSPITAL ENCOUNTER (OUTPATIENT)
Facility: HOSPITAL | Age: 76
Discharge: HOME | End: 2021-11-20
Attending: EMERGENCY MEDICINE | Admitting: INTERNAL MEDICINE
Payer: MEDICARE

## 2021-01-01 ENCOUNTER — DOCUMENT SCAN (OUTPATIENT)
Dept: HOME HEALTH SERVICES | Facility: HOSPITAL | Age: 76
End: 2021-01-01

## 2021-01-01 ENCOUNTER — TELEPHONE (OUTPATIENT)
Dept: CARDIOLOGY | Facility: CLINIC | Age: 76
End: 2021-01-01
Payer: MEDICARE

## 2021-01-01 ENCOUNTER — ANESTHESIA (OUTPATIENT)
Dept: SURGERY | Facility: HOSPITAL | Age: 76
End: 2021-01-01
Payer: MEDICARE

## 2021-01-01 ENCOUNTER — ANESTHESIA EVENT (OUTPATIENT)
Dept: SURGERY | Facility: HOSPITAL | Age: 76
End: 2021-01-01
Payer: MEDICARE

## 2021-01-01 ENCOUNTER — CLINICAL SUPPORT (OUTPATIENT)
Dept: CARDIOLOGY | Facility: HOSPITAL | Age: 76
End: 2021-01-01
Attending: HOSPITALIST
Payer: MEDICARE

## 2021-01-01 ENCOUNTER — HOSPITAL ENCOUNTER (OUTPATIENT)
Facility: HOSPITAL | Age: 76
Discharge: HOME HEALTH | End: 2021-10-14
Attending: EMERGENCY MEDICINE | Admitting: INTERNAL MEDICINE
Payer: MEDICARE

## 2021-01-01 VITALS
TEMPERATURE: 98 F | WEIGHT: 132 LBS | OXYGEN SATURATION: 95 % | HEIGHT: 65 IN | BODY MASS INDEX: 21.99 KG/M2 | DIASTOLIC BLOOD PRESSURE: 65 MMHG | HEART RATE: 75 BPM | RESPIRATION RATE: 18 BRPM | SYSTOLIC BLOOD PRESSURE: 128 MMHG

## 2021-01-01 VITALS
RESPIRATION RATE: 18 BRPM | DIASTOLIC BLOOD PRESSURE: 66 MMHG | WEIGHT: 132 LBS | OXYGEN SATURATION: 97 % | HEART RATE: 71 BPM | HEIGHT: 65 IN | BODY MASS INDEX: 21.99 KG/M2 | SYSTOLIC BLOOD PRESSURE: 126 MMHG

## 2021-01-01 VITALS
DIASTOLIC BLOOD PRESSURE: 82 MMHG | BODY MASS INDEX: 22.66 KG/M2 | SYSTOLIC BLOOD PRESSURE: 138 MMHG | RESPIRATION RATE: 16 BRPM | HEART RATE: 69 BPM | WEIGHT: 136 LBS | HEIGHT: 65 IN | OXYGEN SATURATION: 93 %

## 2021-01-01 VITALS
OXYGEN SATURATION: 100 % | WEIGHT: 136 LBS | HEIGHT: 65 IN | SYSTOLIC BLOOD PRESSURE: 120 MMHG | DIASTOLIC BLOOD PRESSURE: 78 MMHG | BODY MASS INDEX: 22.66 KG/M2 | RESPIRATION RATE: 18 BRPM | HEART RATE: 99 BPM

## 2021-01-01 VITALS
SYSTOLIC BLOOD PRESSURE: 124 MMHG | DIASTOLIC BLOOD PRESSURE: 80 MMHG | OXYGEN SATURATION: 100 % | TEMPERATURE: 98 F | HEART RATE: 101 BPM | RESPIRATION RATE: 25 BRPM | HEIGHT: 65 IN | WEIGHT: 137.56 LBS | BODY MASS INDEX: 22.92 KG/M2

## 2021-01-01 VITALS
TEMPERATURE: 98 F | HEART RATE: 67 BPM | RESPIRATION RATE: 18 BRPM | WEIGHT: 138.25 LBS | SYSTOLIC BLOOD PRESSURE: 134 MMHG | OXYGEN SATURATION: 100 % | BODY MASS INDEX: 23.03 KG/M2 | HEIGHT: 65 IN | DIASTOLIC BLOOD PRESSURE: 75 MMHG

## 2021-01-01 VITALS
SYSTOLIC BLOOD PRESSURE: 114 MMHG | BODY MASS INDEX: 22.63 KG/M2 | HEART RATE: 51 BPM | HEIGHT: 65 IN | DIASTOLIC BLOOD PRESSURE: 62 MMHG

## 2021-01-01 DIAGNOSIS — I48.11 LONGSTANDING PERSISTENT ATRIAL FIBRILLATION: Primary | ICD-10-CM

## 2021-01-01 DIAGNOSIS — K92.2 GASTROINTESTINAL HEMORRHAGE, UNSPECIFIED GASTROINTESTINAL HEMORRHAGE TYPE: Primary | ICD-10-CM

## 2021-01-01 DIAGNOSIS — Z79.01 LONG TERM (CURRENT) USE OF ANTICOAGULANTS: ICD-10-CM

## 2021-01-01 DIAGNOSIS — K52.9 COLITIS: ICD-10-CM

## 2021-01-01 DIAGNOSIS — K92.2 GASTROINTESTINAL HEMORRHAGE: ICD-10-CM

## 2021-01-01 DIAGNOSIS — N18.6 ESRD (END STAGE RENAL DISEASE): ICD-10-CM

## 2021-01-01 DIAGNOSIS — M26.622 ARTHRALGIA OF LEFT TEMPOROMANDIBULAR JOINT: ICD-10-CM

## 2021-01-01 DIAGNOSIS — R06.02 SHORTNESS OF BREATH: ICD-10-CM

## 2021-01-01 DIAGNOSIS — Z95.2 H/O MITRAL VALVE REPLACEMENT: ICD-10-CM

## 2021-01-01 DIAGNOSIS — I48.11 LONGSTANDING PERSISTENT ATRIAL FIBRILLATION: ICD-10-CM

## 2021-01-01 DIAGNOSIS — I48.91 A-FIB: ICD-10-CM

## 2021-01-01 DIAGNOSIS — R07.9 CHEST PAIN: ICD-10-CM

## 2021-01-01 DIAGNOSIS — E83.59 CALCIPHYLAXIS: ICD-10-CM

## 2021-01-01 DIAGNOSIS — Z79.01 WARFARIN ANTICOAGULATION: ICD-10-CM

## 2021-01-01 DIAGNOSIS — I49.9 ARRHYTHMIA: ICD-10-CM

## 2021-01-01 DIAGNOSIS — Z79.01 LONG TERM (CURRENT) USE OF ANTICOAGULANTS: Primary | ICD-10-CM

## 2021-01-01 DIAGNOSIS — L60.2 OG (ONYCHOGRYPHOSIS): ICD-10-CM

## 2021-01-01 DIAGNOSIS — Z79.01 LONG TERM CURRENT USE OF ANTICOAGULANT: ICD-10-CM

## 2021-01-01 DIAGNOSIS — M79.675 CHRONIC TOE PAIN, LEFT FOOT: ICD-10-CM

## 2021-01-01 DIAGNOSIS — I73.9 PVD (PERIPHERAL VASCULAR DISEASE): Chronic | ICD-10-CM

## 2021-01-01 DIAGNOSIS — Z95.2 H/O MITRAL VALVE REPLACEMENT: Primary | ICD-10-CM

## 2021-01-01 DIAGNOSIS — Z66 DNR (DO NOT RESUSCITATE): ICD-10-CM

## 2021-01-01 DIAGNOSIS — R06.02 SOB (SHORTNESS OF BREATH): ICD-10-CM

## 2021-01-01 DIAGNOSIS — I10 ESSENTIAL HYPERTENSION: Chronic | ICD-10-CM

## 2021-01-01 DIAGNOSIS — I50.30 DIASTOLIC CONGESTIVE HEART FAILURE, UNSPECIFIED HF CHRONICITY: Primary | ICD-10-CM

## 2021-01-01 DIAGNOSIS — I50.42 CHRONIC COMBINED SYSTOLIC AND DIASTOLIC CONGESTIVE HEART FAILURE: ICD-10-CM

## 2021-01-01 DIAGNOSIS — I70.90 ARTERIAL OCCLUSION DUE TO ARTERIOSCLEROSIS: ICD-10-CM

## 2021-01-01 DIAGNOSIS — I50.30 DIASTOLIC CONGESTIVE HEART FAILURE, UNSPECIFIED HF CHRONICITY: ICD-10-CM

## 2021-01-01 DIAGNOSIS — K92.2 GI BLEED: ICD-10-CM

## 2021-01-01 DIAGNOSIS — G89.29 CHRONIC TOE PAIN, LEFT FOOT: ICD-10-CM

## 2021-01-01 DIAGNOSIS — R00.0 TACHYCARDIA: ICD-10-CM

## 2021-01-01 DIAGNOSIS — I48.91 ATRIAL FIBRILLATION WITH RAPID VENTRICULAR RESPONSE: ICD-10-CM

## 2021-01-01 DIAGNOSIS — E78.00 PURE HYPERCHOLESTEROLEMIA: Chronic | ICD-10-CM

## 2021-01-01 DIAGNOSIS — Z95.2 H/O MITRAL VALVE REPLACEMENT: Chronic | ICD-10-CM

## 2021-01-01 DIAGNOSIS — K57.92 ACUTE DIVERTICULITIS: Primary | ICD-10-CM

## 2021-01-01 DIAGNOSIS — I50.9 CHF (CONGESTIVE HEART FAILURE): ICD-10-CM

## 2021-01-01 DIAGNOSIS — I73.9 PVD (PERIPHERAL VASCULAR DISEASE): Primary | ICD-10-CM

## 2021-01-01 DIAGNOSIS — I70.90 ARTERIAL OCCLUSION DUE TO ARTERIOSCLEROSIS: Primary | ICD-10-CM

## 2021-01-01 DIAGNOSIS — B35.1 ONYCHOMYCOSIS DUE TO DERMATOPHYTE: ICD-10-CM

## 2021-01-01 LAB
ABO + RH BLD: NORMAL
ALBUMIN SERPL BCP-MCNC: 2.9 G/DL (ref 3.5–5.2)
ALBUMIN SERPL BCP-MCNC: 3 G/DL (ref 3.5–5.2)
ALBUMIN SERPL BCP-MCNC: 3.1 G/DL (ref 3.5–5.2)
ALBUMIN SERPL BCP-MCNC: 3.2 G/DL (ref 3.5–5.2)
ALBUMIN SERPL BCP-MCNC: 3.5 G/DL (ref 3.5–5.2)
ALBUMIN SERPL BCP-MCNC: 3.7 G/DL (ref 3.5–5.2)
ALP SERPL-CCNC: 74 U/L (ref 55–135)
ALP SERPL-CCNC: 77 U/L (ref 55–135)
ALP SERPL-CCNC: 79 U/L (ref 55–135)
ALP SERPL-CCNC: 81 U/L (ref 55–135)
ALP SERPL-CCNC: 81 U/L (ref 55–135)
ALT SERPL W/O P-5'-P-CCNC: 10 U/L (ref 10–44)
ALT SERPL W/O P-5'-P-CCNC: 10 U/L (ref 10–44)
ALT SERPL W/O P-5'-P-CCNC: 7 U/L (ref 10–44)
ALT SERPL W/O P-5'-P-CCNC: 8 U/L (ref 10–44)
ALT SERPL W/O P-5'-P-CCNC: 9 U/L (ref 10–44)
ANION GAP SERPL CALC-SCNC: 12 MMOL/L (ref 8–16)
ANION GAP SERPL CALC-SCNC: 13 MMOL/L (ref 8–16)
ANION GAP SERPL CALC-SCNC: 13 MMOL/L (ref 8–16)
ANION GAP SERPL CALC-SCNC: 14 MMOL/L (ref 8–16)
ANION GAP SERPL CALC-SCNC: 16 MMOL/L (ref 8–16)
ANION GAP SERPL CALC-SCNC: 17 MMOL/L (ref 8–16)
ANION GAP SERPL CALC-SCNC: 18 MMOL/L (ref 8–16)
AORTIC ROOT ANNULUS: 2.98 CM
AORTIC VALVE CUSP SEPERATION: 1.21 CM
APTT PPP: 27 SEC (ref 25.6–35.8)
AST SERPL-CCNC: 13 U/L (ref 10–40)
AST SERPL-CCNC: 13 U/L (ref 10–40)
AST SERPL-CCNC: 14 U/L (ref 10–40)
AST SERPL-CCNC: 15 U/L (ref 10–40)
AST SERPL-CCNC: 19 U/L (ref 10–40)
AV INDEX (PROSTH): 0.62
AV MEAN GRADIENT: 4 MMHG
AV PEAK GRADIENT: 7 MMHG
AV VALVE AREA: 1.9 CM2
AV VELOCITY RATIO: 56.05
BACTERIA BLD CULT: NORMAL
BASOPHILS # BLD AUTO: 0.01 K/UL (ref 0–0.2)
BASOPHILS # BLD AUTO: 0.01 K/UL (ref 0–0.2)
BASOPHILS # BLD AUTO: 0.02 K/UL (ref 0–0.2)
BASOPHILS # BLD AUTO: 0.02 K/UL (ref 0–0.2)
BASOPHILS # BLD AUTO: 0.03 K/UL (ref 0–0.2)
BASOPHILS NFR BLD: 0.2 % (ref 0–1.9)
BASOPHILS NFR BLD: 0.3 % (ref 0–1.9)
BASOPHILS NFR BLD: 0.5 % (ref 0–1.9)
BASOPHILS NFR BLD: 0.6 % (ref 0–1.9)
BASOPHILS NFR BLD: 0.7 % (ref 0–1.9)
BILIRUB SERPL-MCNC: 0.7 MG/DL (ref 0.1–1)
BILIRUB SERPL-MCNC: 0.9 MG/DL (ref 0.1–1)
BILIRUB SERPL-MCNC: 1 MG/DL (ref 0.1–1)
BILIRUB SERPL-MCNC: 1.1 MG/DL (ref 0.1–1)
BILIRUB SERPL-MCNC: 1.2 MG/DL (ref 0.1–1)
BLD GP AB SCN CELLS X3 SERPL QL: NORMAL
BNP SERPL-MCNC: >4500 PG/ML (ref 0–99)
BSA FOR ECHO PROCEDURE: 1.69 M2
BUN SERPL-MCNC: 30 MG/DL (ref 8–23)
BUN SERPL-MCNC: 34 MG/DL (ref 8–23)
BUN SERPL-MCNC: 39 MG/DL (ref 8–23)
BUN SERPL-MCNC: 44 MG/DL (ref 8–23)
BUN SERPL-MCNC: 51 MG/DL (ref 8–23)
BUN SERPL-MCNC: 53 MG/DL (ref 8–23)
BUN SERPL-MCNC: 69 MG/DL (ref 8–23)
CALCIUM SERPL-MCNC: 7.2 MG/DL (ref 8.7–10.5)
CALCIUM SERPL-MCNC: 7.9 MG/DL (ref 8.7–10.5)
CALCIUM SERPL-MCNC: 8 MG/DL (ref 8.7–10.5)
CALCIUM SERPL-MCNC: 8.2 MG/DL (ref 8.7–10.5)
CALCIUM SERPL-MCNC: 8.3 MG/DL (ref 8.7–10.5)
CALCIUM SERPL-MCNC: 8.4 MG/DL (ref 8.7–10.5)
CALCIUM SERPL-MCNC: 8.4 MG/DL (ref 8.7–10.5)
CHLORIDE SERPL-SCNC: 100 MMOL/L (ref 95–110)
CHLORIDE SERPL-SCNC: 100 MMOL/L (ref 95–110)
CHLORIDE SERPL-SCNC: 91 MMOL/L (ref 95–110)
CHLORIDE SERPL-SCNC: 93 MMOL/L (ref 95–110)
CHLORIDE SERPL-SCNC: 96 MMOL/L (ref 95–110)
CHLORIDE SERPL-SCNC: 96 MMOL/L (ref 95–110)
CHLORIDE SERPL-SCNC: 98 MMOL/L (ref 95–110)
CHOLEST SERPL-MCNC: 157 MG/DL (ref 120–199)
CHOLEST/HDLC SERPL: 2.4 {RATIO} (ref 2–5)
CO2 SERPL-SCNC: 23 MMOL/L (ref 23–29)
CO2 SERPL-SCNC: 24 MMOL/L (ref 23–29)
CO2 SERPL-SCNC: 25 MMOL/L (ref 23–29)
CO2 SERPL-SCNC: 29 MMOL/L (ref 23–29)
CO2 SERPL-SCNC: 29 MMOL/L (ref 23–29)
CREAT SERPL-MCNC: 4.6 MG/DL (ref 0.5–1.4)
CREAT SERPL-MCNC: 4.8 MG/DL (ref 0.5–1.4)
CREAT SERPL-MCNC: 5.3 MG/DL (ref 0.5–1.4)
CREAT SERPL-MCNC: 7.1 MG/DL (ref 0.5–1.4)
CREAT SERPL-MCNC: 7.7 MG/DL (ref 0.5–1.4)
CREAT SERPL-MCNC: 8 MG/DL (ref 0.5–1.4)
CREAT SERPL-MCNC: 9.5 MG/DL (ref 0.5–1.4)
CV ECHO LV RWT: 1.08 CM
DIFFERENTIAL METHOD: ABNORMAL
DOP CALC AO PEAK VEL: 1.29 M/S
DOP CALC AO VTI: 19.57 CM
DOP CALC LVOT AREA: 3.1 CM2
DOP CALC LVOT DIAMETER: 1.98 CM
DOP CALC LVOT PEAK VEL: 72.3 M/S
DOP CALC LVOT STROKE VOLUME: 37.18 CM3
DOP CALCLVOT PEAK VEL VTI: 12.08 CM
E WAVE DECELERATION TIME: 240.35 MSEC
ECHO LV POSTERIOR WALL: 2.08 CM (ref 0.6–1.1)
EJECTION FRACTION: 25 %
EOSINOPHIL # BLD AUTO: 0.1 K/UL (ref 0–0.5)
EOSINOPHIL NFR BLD: 1.2 % (ref 0–8)
EOSINOPHIL NFR BLD: 1.7 % (ref 0–8)
EOSINOPHIL NFR BLD: 1.8 % (ref 0–8)
EOSINOPHIL NFR BLD: 2.2 % (ref 0–8)
EOSINOPHIL NFR BLD: 2.3 % (ref 0–8)
ERYTHROCYTE [DISTWIDTH] IN BLOOD BY AUTOMATED COUNT: 15.5 % (ref 11.5–14.5)
ERYTHROCYTE [DISTWIDTH] IN BLOOD BY AUTOMATED COUNT: 15.6 % (ref 11.5–14.5)
ERYTHROCYTE [DISTWIDTH] IN BLOOD BY AUTOMATED COUNT: 15.7 % (ref 11.5–14.5)
ERYTHROCYTE [DISTWIDTH] IN BLOOD BY AUTOMATED COUNT: 15.9 % (ref 11.5–14.5)
ERYTHROCYTE [DISTWIDTH] IN BLOOD BY AUTOMATED COUNT: 15.9 % (ref 11.5–14.5)
EST. GFR  (AFRICAN AMERICAN): 10.1 ML/MIN/1.73 M^2
EST. GFR  (AFRICAN AMERICAN): 4.2 ML/MIN/1.73 M^2
EST. GFR  (AFRICAN AMERICAN): 5.2 ML/MIN/1.73 M^2
EST. GFR  (AFRICAN AMERICAN): 5.4 ML/MIN/1.73 M^2
EST. GFR  (AFRICAN AMERICAN): 6 ML/MIN/1.73 M^2
EST. GFR  (AFRICAN AMERICAN): 8.5 ML/MIN/1.73 M^2
EST. GFR  (AFRICAN AMERICAN): 9.6 ML/MIN/1.73 M^2
EST. GFR  (NON AFRICAN AMERICAN): 3.6 ML/MIN/1.73 M^2
EST. GFR  (NON AFRICAN AMERICAN): 4.5 ML/MIN/1.73 M^2
EST. GFR  (NON AFRICAN AMERICAN): 4.7 ML/MIN/1.73 M^2
EST. GFR  (NON AFRICAN AMERICAN): 5.2 ML/MIN/1.73 M^2
EST. GFR  (NON AFRICAN AMERICAN): 7.4 ML/MIN/1.73 M^2
EST. GFR  (NON AFRICAN AMERICAN): 8.4 ML/MIN/1.73 M^2
EST. GFR  (NON AFRICAN AMERICAN): 8.8 ML/MIN/1.73 M^2
FRACTIONAL SHORTENING: 9 % (ref 28–44)
GLUCOSE SERPL-MCNC: 101 MG/DL (ref 70–110)
GLUCOSE SERPL-MCNC: 148 MG/DL (ref 70–110)
GLUCOSE SERPL-MCNC: 65 MG/DL (ref 70–110)
GLUCOSE SERPL-MCNC: 65 MG/DL (ref 70–110)
GLUCOSE SERPL-MCNC: 69 MG/DL (ref 70–110)
GLUCOSE SERPL-MCNC: 70 MG/DL (ref 70–110)
GLUCOSE SERPL-MCNC: 77 MG/DL (ref 70–110)
GLUCOSE SERPL-MCNC: 79 MG/DL (ref 70–110)
GLUCOSE SERPL-MCNC: 89 MG/DL (ref 70–110)
HCT VFR BLD AUTO: 29.9 % (ref 37–48.5)
HCT VFR BLD AUTO: 31.4 % (ref 37–48.5)
HCT VFR BLD AUTO: 32 % (ref 37–48.5)
HCT VFR BLD AUTO: 32.4 % (ref 37–48.5)
HCT VFR BLD AUTO: 34.7 % (ref 37–48.5)
HCT VFR BLD AUTO: 34.8 % (ref 37–48.5)
HCT VFR BLD AUTO: 35.4 % (ref 37–48.5)
HDLC SERPL-MCNC: 66 MG/DL (ref 40–75)
HDLC SERPL: 42 % (ref 20–50)
HGB BLD-MCNC: 10 G/DL (ref 12–16)
HGB BLD-MCNC: 10 G/DL (ref 12–16)
HGB BLD-MCNC: 10.5 G/DL (ref 12–16)
HGB BLD-MCNC: 10.5 G/DL (ref 12–16)
HGB BLD-MCNC: 10.6 G/DL (ref 12–16)
HGB BLD-MCNC: 9.2 G/DL (ref 12–16)
HGB BLD-MCNC: 9.9 G/DL (ref 12–16)
IMM GRANULOCYTES # BLD AUTO: 0.01 K/UL (ref 0–0.04)
IMM GRANULOCYTES # BLD AUTO: 0.02 K/UL (ref 0–0.04)
IMM GRANULOCYTES # BLD AUTO: 0.03 K/UL (ref 0–0.04)
IMM GRANULOCYTES NFR BLD AUTO: 0.2 % (ref 0–0.5)
IMM GRANULOCYTES NFR BLD AUTO: 0.3 % (ref 0–0.5)
IMM GRANULOCYTES NFR BLD AUTO: 0.3 % (ref 0–0.5)
IMM GRANULOCYTES NFR BLD AUTO: 0.5 % (ref 0–0.5)
IMM GRANULOCYTES NFR BLD AUTO: 0.5 % (ref 0–0.5)
INR PPP: 1.1
INR PPP: 1.2
INR PPP: 1.3
INR PPP: 1.4
INR PPP: 1.5
INR PPP: 1.6
INR PPP: 2.2
INR PPP: 2.2
INR PPP: 2.3
INR PPP: 5.2
INTERVENTRICULAR SEPTUM: 1.99 CM (ref 0.6–1.1)
LDLC SERPL CALC-MCNC: 76.4 MG/DL (ref 63–159)
LEFT ATRIUM SIZE: 5.13 CM
LEFT INTERNAL DIMENSION IN SYSTOLE: 3.51 CM (ref 2.1–4)
LEFT VENTRICLE DIASTOLIC VOLUME INDEX: 50.12 ML/M2
LEFT VENTRICLE DIASTOLIC VOLUME: 84.7 ML
LEFT VENTRICLE MASS INDEX: 217 G/M2
LEFT VENTRICLE SYSTOLIC VOLUME INDEX: 24.5 ML/M2
LEFT VENTRICLE SYSTOLIC VOLUME: 41.4 ML
LEFT VENTRICULAR INTERNAL DIMENSION IN DIASTOLE: 3.85 CM (ref 3.5–6)
LEFT VENTRICULAR MASS: 366.45 G
LIPASE SERPL-CCNC: 37 U/L (ref 4–60)
LYMPHOCYTES # BLD AUTO: 0.5 K/UL (ref 1–4.8)
LYMPHOCYTES # BLD AUTO: 0.5 K/UL (ref 1–4.8)
LYMPHOCYTES # BLD AUTO: 0.7 K/UL (ref 1–4.8)
LYMPHOCYTES # BLD AUTO: 0.8 K/UL (ref 1–4.8)
LYMPHOCYTES # BLD AUTO: 0.8 K/UL (ref 1–4.8)
LYMPHOCYTES NFR BLD: 13.8 % (ref 18–48)
LYMPHOCYTES NFR BLD: 13.9 % (ref 18–48)
LYMPHOCYTES NFR BLD: 15.6 % (ref 18–48)
LYMPHOCYTES NFR BLD: 17.9 % (ref 18–48)
LYMPHOCYTES NFR BLD: 18.2 % (ref 18–48)
MAGNESIUM SERPL-MCNC: 1.5 MG/DL (ref 1.6–2.6)
MAGNESIUM SERPL-MCNC: 1.5 MG/DL (ref 1.6–2.6)
MAGNESIUM SERPL-MCNC: 1.6 MG/DL (ref 1.6–2.6)
MAGNESIUM SERPL-MCNC: 1.6 MG/DL (ref 1.6–2.6)
MAGNESIUM SERPL-MCNC: 1.8 MG/DL (ref 1.6–2.6)
MAGNESIUM SERPL-MCNC: 1.9 MG/DL (ref 1.6–2.6)
MCH RBC QN AUTO: 27.3 PG (ref 27–31)
MCH RBC QN AUTO: 27.9 PG (ref 27–31)
MCH RBC QN AUTO: 27.9 PG (ref 27–31)
MCH RBC QN AUTO: 28 PG (ref 27–31)
MCH RBC QN AUTO: 28.2 PG (ref 27–31)
MCH RBC QN AUTO: 28.2 PG (ref 27–31)
MCH RBC QN AUTO: 29 PG (ref 27–31)
MCHC RBC AUTO-ENTMCNC: 29.7 G/DL (ref 32–36)
MCHC RBC AUTO-ENTMCNC: 30.3 G/DL (ref 32–36)
MCHC RBC AUTO-ENTMCNC: 30.5 G/DL (ref 32–36)
MCHC RBC AUTO-ENTMCNC: 30.8 G/DL (ref 32–36)
MCHC RBC AUTO-ENTMCNC: 30.9 G/DL (ref 32–36)
MCHC RBC AUTO-ENTMCNC: 30.9 G/DL (ref 32–36)
MCHC RBC AUTO-ENTMCNC: 31.8 G/DL (ref 32–36)
MCV RBC AUTO: 89 FL (ref 82–98)
MCV RBC AUTO: 91 FL (ref 82–98)
MCV RBC AUTO: 93 FL (ref 82–98)
MCV RBC AUTO: 93 FL (ref 82–98)
MCV RBC AUTO: 94 FL (ref 82–98)
MONOCYTES # BLD AUTO: 0.3 K/UL (ref 0.3–1)
MONOCYTES # BLD AUTO: 0.4 K/UL (ref 0.3–1)
MONOCYTES # BLD AUTO: 0.5 K/UL (ref 0.3–1)
MONOCYTES # BLD AUTO: 0.5 K/UL (ref 0.3–1)
MONOCYTES # BLD AUTO: 0.6 K/UL (ref 0.3–1)
MONOCYTES NFR BLD: 10 % (ref 4–15)
MONOCYTES NFR BLD: 10.7 % (ref 4–15)
MONOCYTES NFR BLD: 11.2 % (ref 4–15)
MONOCYTES NFR BLD: 12.7 % (ref 4–15)
MONOCYTES NFR BLD: 8.3 % (ref 4–15)
MV PEAK E VEL: 2.26 M/S
NEUTROPHILS # BLD AUTO: 2.4 K/UL (ref 1.8–7.7)
NEUTROPHILS # BLD AUTO: 2.7 K/UL (ref 1.8–7.7)
NEUTROPHILS # BLD AUTO: 2.8 K/UL (ref 1.8–7.7)
NEUTROPHILS # BLD AUTO: 3 K/UL (ref 1.8–7.7)
NEUTROPHILS # BLD AUTO: 4.2 K/UL (ref 1.8–7.7)
NEUTROPHILS NFR BLD: 66 % (ref 38–73)
NEUTROPHILS NFR BLD: 68.6 % (ref 38–73)
NEUTROPHILS NFR BLD: 70 % (ref 38–73)
NEUTROPHILS NFR BLD: 74.2 % (ref 38–73)
NEUTROPHILS NFR BLD: 75.6 % (ref 38–73)
NONHDLC SERPL-MCNC: 91 MG/DL
NRBC BLD-RTO: 0 /100 WBC
OB PNL STL: POSITIVE
PHOSPHATE SERPL-MCNC: 2.2 MG/DL (ref 2.7–4.5)
PHOSPHATE SERPL-MCNC: 3.1 MG/DL (ref 2.7–4.5)
PHOSPHATE SERPL-MCNC: 3.4 MG/DL (ref 2.7–4.5)
PISA TR MAX VEL: 2.85 M/S
PLATELET # BLD AUTO: 133 K/UL (ref 150–450)
PLATELET # BLD AUTO: 152 K/UL (ref 150–450)
PLATELET # BLD AUTO: 153 K/UL (ref 150–450)
PLATELET # BLD AUTO: 153 K/UL (ref 150–450)
PLATELET # BLD AUTO: 157 K/UL (ref 150–450)
PLATELET # BLD AUTO: 159 K/UL (ref 150–450)
PLATELET # BLD AUTO: 81 K/UL (ref 150–450)
PLATELET BLD QL SMEAR: ABNORMAL
PMV BLD AUTO: 10.3 FL (ref 9.2–12.9)
PMV BLD AUTO: 10.8 FL (ref 9.2–12.9)
PMV BLD AUTO: 11 FL (ref 9.2–12.9)
PMV BLD AUTO: 11.2 FL (ref 9.2–12.9)
PMV BLD AUTO: 11.2 FL (ref 9.2–12.9)
PMV BLD AUTO: 11.9 FL (ref 9.2–12.9)
PMV BLD AUTO: 11.9 FL (ref 9.2–12.9)
POTASSIUM SERPL-SCNC: 3.6 MMOL/L (ref 3.5–5.1)
POTASSIUM SERPL-SCNC: 3.6 MMOL/L (ref 3.5–5.1)
POTASSIUM SERPL-SCNC: 4 MMOL/L (ref 3.5–5.1)
POTASSIUM SERPL-SCNC: 4 MMOL/L (ref 3.5–5.1)
POTASSIUM SERPL-SCNC: 4.6 MMOL/L (ref 3.5–5.1)
POTASSIUM SERPL-SCNC: 4.7 MMOL/L (ref 3.5–5.1)
POTASSIUM SERPL-SCNC: 4.8 MMOL/L (ref 3.5–5.1)
PROCALCITONIN SERPL IA-MCNC: 0.24 NG/ML (ref 0–0.5)
PROCALCITONIN SERPL IA-MCNC: 0.26 NG/ML (ref 0–0.5)
PROCALCITONIN SERPL IA-MCNC: 0.33 NG/ML (ref 0–0.5)
PROT SERPL-MCNC: 6.6 G/DL (ref 6–8.4)
PROT SERPL-MCNC: 7.8 G/DL (ref 6–8.4)
PROT SERPL-MCNC: 7.8 G/DL (ref 6–8.4)
PROT SERPL-MCNC: 7.9 G/DL (ref 6–8.4)
PROT SERPL-MCNC: 8.2 G/DL (ref 6–8.4)
PROTHROMBIN TIME: 13.4 SEC (ref 11.8–14.3)
PROTHROMBIN TIME: 14.4 SEC (ref 11.8–14.3)
PROTHROMBIN TIME: 14.8 SEC (ref 11.8–14.3)
PROTHROMBIN TIME: 14.9 SEC (ref 11.8–14.3)
PROTHROMBIN TIME: 15.4 SEC (ref 11.8–14.3)
PROTHROMBIN TIME: 15.7 SEC (ref 11.4–13.7)
PROTHROMBIN TIME: 17.2 SEC (ref 11.4–13.7)
PROTHROMBIN TIME: 17.7 SEC (ref 11.8–14.3)
PROTHROMBIN TIME: 18.2 SEC (ref 11.8–14.3)
PV PEAK VELOCITY: 86.78 CM/S
RBC # BLD AUTO: 3.37 M/UL (ref 4–5.4)
RBC # BLD AUTO: 3.45 M/UL (ref 4–5.4)
RBC # BLD AUTO: 3.53 M/UL (ref 4–5.4)
RBC # BLD AUTO: 3.58 M/UL (ref 4–5.4)
RBC # BLD AUTO: 3.72 M/UL (ref 4–5.4)
RBC # BLD AUTO: 3.76 M/UL (ref 4–5.4)
RBC # BLD AUTO: 3.77 M/UL (ref 4–5.4)
RIGHT VENTRICULAR END-DIASTOLIC DIMENSION: 325 CM
SARS-COV-2 RDRP RESP QL NAA+PROBE: NEGATIVE
SODIUM SERPL-SCNC: 134 MMOL/L (ref 136–145)
SODIUM SERPL-SCNC: 135 MMOL/L (ref 136–145)
SODIUM SERPL-SCNC: 137 MMOL/L (ref 136–145)
SODIUM SERPL-SCNC: 138 MMOL/L (ref 136–145)
SODIUM SERPL-SCNC: 143 MMOL/L (ref 136–145)
TR MAX PG: 32 MMHG
TRIGL SERPL-MCNC: 73 MG/DL (ref 30–150)
TROPONIN I SERPL DL<=0.01 NG/ML-MCNC: 0.04 NG/ML
TROPONIN I SERPL DL<=0.01 NG/ML-MCNC: 0.05 NG/ML
WBC # BLD AUTO: 3.47 K/UL (ref 3.9–12.7)
WBC # BLD AUTO: 3.63 K/UL (ref 3.9–12.7)
WBC # BLD AUTO: 4.08 K/UL (ref 3.9–12.7)
WBC # BLD AUTO: 4.11 K/UL (ref 3.9–12.7)
WBC # BLD AUTO: 4.4 K/UL (ref 3.9–12.7)
WBC # BLD AUTO: 4.65 K/UL (ref 3.9–12.7)
WBC # BLD AUTO: 5.7 K/UL (ref 3.9–12.7)

## 2021-01-01 PROCEDURE — 1126F PR PAIN SEVERITY QUANTIFIED, NO PAIN PRESENT: ICD-10-PCS | Mod: S$GLB,,, | Performed by: PODIATRIST

## 2021-01-01 PROCEDURE — 93793 ANTICOAG MGMT PT WARFARIN: CPT | Mod: S$GLB,,,

## 2021-01-01 PROCEDURE — 93010 ELECTROCARDIOGRAM REPORT: CPT | Mod: ,,, | Performed by: INTERNAL MEDICINE

## 2021-01-01 PROCEDURE — 99900035 HC TECH TIME PER 15 MIN (STAT)

## 2021-01-01 PROCEDURE — 27000221 HC OXYGEN, UP TO 24 HOURS

## 2021-01-01 PROCEDURE — 85610 PROTHROMBIN TIME: CPT | Performed by: INTERNAL MEDICINE

## 2021-01-01 PROCEDURE — 93793 PR ANTICOAGULANT MGMT FOR PT TAKING WARFARIN: ICD-10-PCS | Mod: S$GLB,,,

## 2021-01-01 PROCEDURE — 83690 ASSAY OF LIPASE: CPT | Performed by: EMERGENCY MEDICINE

## 2021-01-01 PROCEDURE — 3079F DIAST BP 80-89 MM HG: CPT | Mod: CPTII,S$GLB,, | Performed by: INTERNAL MEDICINE

## 2021-01-01 PROCEDURE — 96365 THER/PROPH/DIAG IV INF INIT: CPT | Mod: 59

## 2021-01-01 PROCEDURE — 3078F DIAST BP <80 MM HG: CPT | Mod: CPTII,S$GLB,, | Performed by: PODIATRIST

## 2021-01-01 PROCEDURE — 37000008 HC ANESTHESIA 1ST 15 MINUTES: Performed by: INTERNAL MEDICINE

## 2021-01-01 PROCEDURE — 99211 OFF/OP EST MAY X REQ PHY/QHP: CPT | Mod: S$GLB,,, | Performed by: INTERNAL MEDICINE

## 2021-01-01 PROCEDURE — 3288F PR FALLS RISK ASSESSMENT DOCUMENTED: ICD-10-PCS | Mod: CPTII,S$GLB,, | Performed by: PODIATRIST

## 2021-01-01 PROCEDURE — 3008F BODY MASS INDEX DOCD: CPT | Mod: CPTII,S$GLB,, | Performed by: PODIATRIST

## 2021-01-01 PROCEDURE — 1101F PT FALLS ASSESS-DOCD LE1/YR: CPT | Mod: CPTII,S$GLB,, | Performed by: PODIATRIST

## 2021-01-01 PROCEDURE — 83735 ASSAY OF MAGNESIUM: CPT | Performed by: EMERGENCY MEDICINE

## 2021-01-01 PROCEDURE — 85025 COMPLETE CBC W/AUTO DIFF WBC: CPT | Performed by: EMERGENCY MEDICINE

## 2021-01-01 PROCEDURE — 36415 COLL VENOUS BLD VENIPUNCTURE: CPT | Performed by: EMERGENCY MEDICINE

## 2021-01-01 PROCEDURE — 99499 NO LOS: ICD-10-PCS | Mod: S$GLB,,, | Performed by: PODIATRIST

## 2021-01-01 PROCEDURE — 3066F PR DOCUMENTATION OF TREATMENT FOR NEPHROPATHY: ICD-10-PCS | Mod: CPTII,S$GLB,, | Performed by: INTERNAL MEDICINE

## 2021-01-01 PROCEDURE — 63600175 PHARM REV CODE 636 W HCPCS: Performed by: NURSE ANESTHETIST, CERTIFIED REGISTERED

## 2021-01-01 PROCEDURE — U0002 COVID-19 LAB TEST NON-CDC: HCPCS | Performed by: STUDENT IN AN ORGANIZED HEALTH CARE EDUCATION/TRAINING PROGRAM

## 2021-01-01 PROCEDURE — 36415 COLL VENOUS BLD VENIPUNCTURE: CPT | Performed by: INTERNAL MEDICINE

## 2021-01-01 PROCEDURE — 94761 N-INVAS EAR/PLS OXIMETRY MLT: CPT

## 2021-01-01 PROCEDURE — 90935 HEMODIALYSIS ONE EVALUATION: CPT

## 2021-01-01 PROCEDURE — 80053 COMPREHEN METABOLIC PANEL: CPT | Performed by: EMERGENCY MEDICINE

## 2021-01-01 PROCEDURE — 3066F NEPHROPATHY DOC TX: CPT | Mod: CPTII,S$GLB,, | Performed by: INTERNAL MEDICINE

## 2021-01-01 PROCEDURE — 83880 ASSAY OF NATRIURETIC PEPTIDE: CPT | Performed by: EMERGENCY MEDICINE

## 2021-01-01 PROCEDURE — 25000003 PHARM REV CODE 250: Performed by: NURSE PRACTITIONER

## 2021-01-01 PROCEDURE — C9113 INJ PANTOPRAZOLE SODIUM, VIA: HCPCS | Performed by: STUDENT IN AN ORGANIZED HEALTH CARE EDUCATION/TRAINING PROGRAM

## 2021-01-01 PROCEDURE — 25000003 PHARM REV CODE 250: Performed by: STUDENT IN AN ORGANIZED HEALTH CARE EDUCATION/TRAINING PROGRAM

## 2021-01-01 PROCEDURE — 99900031 HC PATIENT EDUCATION (STAT)

## 2021-01-01 PROCEDURE — 93306 TTE W/DOPPLER COMPLETE: CPT | Mod: 26,,, | Performed by: INTERNAL MEDICINE

## 2021-01-01 PROCEDURE — 84145 PROCALCITONIN (PCT): CPT | Performed by: NURSE PRACTITIONER

## 2021-01-01 PROCEDURE — 63600175 PHARM REV CODE 636 W HCPCS: Performed by: NURSE PRACTITIONER

## 2021-01-01 PROCEDURE — 99220 PR INITIAL OBSERVATION CARE,LEVL III: ICD-10-PCS | Mod: ,,, | Performed by: INTERNAL MEDICINE

## 2021-01-01 PROCEDURE — G0378 HOSPITAL OBSERVATION PER HR: HCPCS

## 2021-01-01 PROCEDURE — 85610 PROTHROMBIN TIME: CPT | Performed by: NURSE PRACTITIONER

## 2021-01-01 PROCEDURE — 37000009 HC ANESTHESIA EA ADD 15 MINS: Performed by: INTERNAL MEDICINE

## 2021-01-01 PROCEDURE — 94660 CPAP INITIATION&MGMT: CPT

## 2021-01-01 PROCEDURE — 3075F SYST BP GE 130 - 139MM HG: CPT | Mod: CPTII,S$GLB,, | Performed by: INTERNAL MEDICINE

## 2021-01-01 PROCEDURE — 1159F PR MEDICATION LIST DOCUMENTED IN MEDICAL RECORD: ICD-10-PCS | Mod: S$GLB,,, | Performed by: FAMILY MEDICINE

## 2021-01-01 PROCEDURE — 1159F MED LIST DOCD IN RCRD: CPT | Mod: CPTII,S$GLB,, | Performed by: INTERNAL MEDICINE

## 2021-01-01 PROCEDURE — 93005 ELECTROCARDIOGRAM TRACING: CPT | Mod: 59 | Performed by: INTERNAL MEDICINE

## 2021-01-01 PROCEDURE — 3008F PR BODY MASS INDEX (BMI) DOCUMENTED: ICD-10-PCS | Mod: S$GLB,,, | Performed by: FAMILY MEDICINE

## 2021-01-01 PROCEDURE — 93005 ELECTROCARDIOGRAM TRACING: CPT | Performed by: INTERNAL MEDICINE

## 2021-01-01 PROCEDURE — 3288F PR FALLS RISK ASSESSMENT DOCUMENTED: ICD-10-PCS | Mod: S$GLB,,, | Performed by: FAMILY MEDICINE

## 2021-01-01 PROCEDURE — 25000003 PHARM REV CODE 250: Performed by: INTERNAL MEDICINE

## 2021-01-01 PROCEDURE — 96376 TX/PRO/DX INJ SAME DRUG ADON: CPT | Mod: 59

## 2021-01-01 PROCEDURE — 83735 ASSAY OF MAGNESIUM: CPT | Mod: 91 | Performed by: NURSE PRACTITIONER

## 2021-01-01 PROCEDURE — 96375 TX/PRO/DX INJ NEW DRUG ADDON: CPT

## 2021-01-01 PROCEDURE — 85027 COMPLETE CBC AUTOMATED: CPT | Performed by: STUDENT IN AN ORGANIZED HEALTH CARE EDUCATION/TRAINING PROGRAM

## 2021-01-01 PROCEDURE — 99499 UNLISTED E&M SERVICE: CPT | Mod: S$GLB,,, | Performed by: PODIATRIST

## 2021-01-01 PROCEDURE — 36415 COLL VENOUS BLD VENIPUNCTURE: CPT | Performed by: NURSE PRACTITIONER

## 2021-01-01 PROCEDURE — 80061 LIPID PANEL: CPT | Performed by: NURSE PRACTITIONER

## 2021-01-01 PROCEDURE — 84145 PROCALCITONIN (PCT): CPT | Performed by: HOSPITALIST

## 2021-01-01 PROCEDURE — 1160F PR REVIEW ALL MEDS BY PRESCRIBER/CLIN PHARMACIST DOCUMENTED: ICD-10-PCS | Mod: CPTII,S$GLB,, | Performed by: PODIATRIST

## 2021-01-01 PROCEDURE — 84484 ASSAY OF TROPONIN QUANT: CPT | Performed by: HOSPITALIST

## 2021-01-01 PROCEDURE — 80053 COMPREHEN METABOLIC PANEL: CPT | Mod: 91 | Performed by: NURSE PRACTITIONER

## 2021-01-01 PROCEDURE — 1160F RVW MEDS BY RX/DR IN RCRD: CPT | Mod: CPTII,S$GLB,, | Performed by: INTERNAL MEDICINE

## 2021-01-01 PROCEDURE — 99220 PR INITIAL OBSERVATION CARE,LEVL III: CPT | Mod: ,,, | Performed by: INTERNAL MEDICINE

## 2021-01-01 PROCEDURE — 99285 EMERGENCY DEPT VISIT HI MDM: CPT | Mod: 25,CS

## 2021-01-01 PROCEDURE — 1126F PR PAIN SEVERITY QUANTIFIED, NO PAIN PRESENT: ICD-10-PCS | Mod: CPTII,S$GLB,, | Performed by: INTERNAL MEDICINE

## 2021-01-01 PROCEDURE — 93306 TTE W/DOPPLER COMPLETE: CPT

## 2021-01-01 PROCEDURE — 3288F FALL RISK ASSESSMENT DOCD: CPT | Mod: CPTII,S$GLB,, | Performed by: INTERNAL MEDICINE

## 2021-01-01 PROCEDURE — 11721 PR DEBRIDEMENT OF NAILS, 6 OR MORE: ICD-10-PCS | Mod: Q8,S$GLB,, | Performed by: PODIATRIST

## 2021-01-01 PROCEDURE — 1101F PR PT FALLS ASSESS DOC 0-1 FALLS W/OUT INJ PAST YR: ICD-10-PCS | Mod: S$GLB,,, | Performed by: FAMILY MEDICINE

## 2021-01-01 PROCEDURE — G0378 HOSPITAL OBSERVATION PER HR: HCPCS | Mod: 59

## 2021-01-01 PROCEDURE — 36415 COLL VENOUS BLD VENIPUNCTURE: CPT | Performed by: HOSPITALIST

## 2021-01-01 PROCEDURE — 4010F ACE/ARB THERAPY RXD/TAKEN: CPT | Mod: CPTII,S$GLB,, | Performed by: INTERNAL MEDICINE

## 2021-01-01 PROCEDURE — 85025 COMPLETE CBC W/AUTO DIFF WBC: CPT | Performed by: STUDENT IN AN ORGANIZED HEALTH CARE EDUCATION/TRAINING PROGRAM

## 2021-01-01 PROCEDURE — 99211 PR OFFICE/OUTPT VISIT, EST, LEVL I: ICD-10-PCS | Mod: S$GLB,,, | Performed by: INTERNAL MEDICINE

## 2021-01-01 PROCEDURE — 99284 EMERGENCY DEPT VISIT MOD MDM: CPT | Mod: 25,CS

## 2021-01-01 PROCEDURE — 4010F PR ACE/ARB THEARPY RXD/TAKEN: ICD-10-PCS | Mod: CPTII,S$GLB,, | Performed by: INTERNAL MEDICINE

## 2021-01-01 PROCEDURE — 3079F PR MOST RECENT DIASTOLIC BLOOD PRESSURE 80-89 MM HG: ICD-10-PCS | Mod: CPTII,S$GLB,, | Performed by: INTERNAL MEDICINE

## 2021-01-01 PROCEDURE — 94640 AIRWAY INHALATION TREATMENT: CPT

## 2021-01-01 PROCEDURE — 25000242 PHARM REV CODE 250 ALT 637 W/ HCPCS: Performed by: NURSE PRACTITIONER

## 2021-01-01 PROCEDURE — 84100 ASSAY OF PHOSPHORUS: CPT | Performed by: NURSE PRACTITIONER

## 2021-01-01 PROCEDURE — 85730 THROMBOPLASTIN TIME PARTIAL: CPT | Performed by: STUDENT IN AN ORGANIZED HEALTH CARE EDUCATION/TRAINING PROGRAM

## 2021-01-01 PROCEDURE — 84484 ASSAY OF TROPONIN QUANT: CPT | Performed by: NURSE PRACTITIONER

## 2021-01-01 PROCEDURE — 25000003 PHARM REV CODE 250: Performed by: EMERGENCY MEDICINE

## 2021-01-01 PROCEDURE — 96376 TX/PRO/DX INJ SAME DRUG ADON: CPT

## 2021-01-01 PROCEDURE — 93306 ECHO (CUPID ONLY): ICD-10-PCS | Mod: 26,,, | Performed by: INTERNAL MEDICINE

## 2021-01-01 PROCEDURE — 84145 PROCALCITONIN (PCT): CPT | Performed by: STUDENT IN AN ORGANIZED HEALTH CARE EDUCATION/TRAINING PROGRAM

## 2021-01-01 PROCEDURE — U0002 COVID-19 LAB TEST NON-CDC: HCPCS | Performed by: EMERGENCY MEDICINE

## 2021-01-01 PROCEDURE — 86900 BLOOD TYPING SEROLOGIC ABO: CPT | Performed by: STUDENT IN AN ORGANIZED HEALTH CARE EDUCATION/TRAINING PROGRAM

## 2021-01-01 PROCEDURE — 11721 DEBRIDE NAIL 6 OR MORE: CPT | Mod: Q8,S$GLB,, | Performed by: PODIATRIST

## 2021-01-01 PROCEDURE — 63600175 PHARM REV CODE 636 W HCPCS: Performed by: HOSPITALIST

## 2021-01-01 PROCEDURE — 99214 PR OFFICE/OUTPT VISIT, EST, LEVL IV, 30-39 MIN: ICD-10-PCS | Mod: S$GLB,,, | Performed by: FAMILY MEDICINE

## 2021-01-01 PROCEDURE — 99214 OFFICE O/P EST MOD 30 MIN: CPT | Mod: S$GLB,,, | Performed by: FAMILY MEDICINE

## 2021-01-01 PROCEDURE — 84100 ASSAY OF PHOSPHORUS: CPT | Performed by: STUDENT IN AN ORGANIZED HEALTH CARE EDUCATION/TRAINING PROGRAM

## 2021-01-01 PROCEDURE — 83880 ASSAY OF NATRIURETIC PEPTIDE: CPT | Performed by: HOSPITALIST

## 2021-01-01 PROCEDURE — 36415 COLL VENOUS BLD VENIPUNCTURE: CPT | Performed by: FAMILY MEDICINE

## 2021-01-01 PROCEDURE — 1101F PT FALLS ASSESS-DOCD LE1/YR: CPT | Mod: S$GLB,,, | Performed by: FAMILY MEDICINE

## 2021-01-01 PROCEDURE — 63600175 PHARM REV CODE 636 W HCPCS: Performed by: FAMILY MEDICINE

## 2021-01-01 PROCEDURE — 63600175 PHARM REV CODE 636 W HCPCS: Performed by: EMERGENCY MEDICINE

## 2021-01-01 PROCEDURE — 82272 OCCULT BLD FECES 1-3 TESTS: CPT | Performed by: EMERGENCY MEDICINE

## 2021-01-01 PROCEDURE — 96365 THER/PROPH/DIAG IV INF INIT: CPT

## 2021-01-01 PROCEDURE — 85027 COMPLETE CBC AUTOMATED: CPT | Performed by: HOSPITALIST

## 2021-01-01 PROCEDURE — 83735 ASSAY OF MAGNESIUM: CPT | Performed by: NURSE PRACTITIONER

## 2021-01-01 PROCEDURE — 93010 EKG 12-LEAD: ICD-10-PCS | Mod: ,,, | Performed by: INTERNAL MEDICINE

## 2021-01-01 PROCEDURE — 3066F NEPHROPATHY DOC TX: CPT | Mod: CPTII,S$GLB,, | Performed by: PODIATRIST

## 2021-01-01 PROCEDURE — 3288F PR FALLS RISK ASSESSMENT DOCUMENTED: ICD-10-PCS | Mod: CPTII,S$GLB,, | Performed by: INTERNAL MEDICINE

## 2021-01-01 PROCEDURE — 3078F DIAST BP <80 MM HG: CPT | Mod: S$GLB,,, | Performed by: FAMILY MEDICINE

## 2021-01-01 PROCEDURE — 96372 THER/PROPH/DIAG INJ SC/IM: CPT | Mod: 59

## 2021-01-01 PROCEDURE — 25000003 PHARM REV CODE 250: Performed by: NURSE ANESTHETIST, CERTIFIED REGISTERED

## 2021-01-01 PROCEDURE — 87040 BLOOD CULTURE FOR BACTERIA: CPT | Performed by: FAMILY MEDICINE

## 2021-01-01 PROCEDURE — 3074F PR MOST RECENT SYSTOLIC BLOOD PRESSURE < 130 MM HG: ICD-10-PCS | Mod: CPTII,S$GLB,, | Performed by: PODIATRIST

## 2021-01-01 PROCEDURE — 3008F BODY MASS INDEX DOCD: CPT | Mod: S$GLB,,, | Performed by: FAMILY MEDICINE

## 2021-01-01 PROCEDURE — 25000003 PHARM REV CODE 250: Performed by: FAMILY MEDICINE

## 2021-01-01 PROCEDURE — 94799 UNLISTED PULMONARY SVC/PX: CPT

## 2021-01-01 PROCEDURE — 83735 ASSAY OF MAGNESIUM: CPT | Performed by: STUDENT IN AN ORGANIZED HEALTH CARE EDUCATION/TRAINING PROGRAM

## 2021-01-01 PROCEDURE — 4010F ACE/ARB THERAPY RXD/TAKEN: CPT | Mod: CPTII,S$GLB,, | Performed by: PODIATRIST

## 2021-01-01 PROCEDURE — 1159F MED LIST DOCD IN RCRD: CPT | Mod: CPTII,S$GLB,, | Performed by: PODIATRIST

## 2021-01-01 PROCEDURE — 25000003 PHARM REV CODE 250: Performed by: HOSPITALIST

## 2021-01-01 PROCEDURE — 25000242 PHARM REV CODE 250 ALT 637 W/ HCPCS

## 2021-01-01 PROCEDURE — 3078F PR MOST RECENT DIASTOLIC BLOOD PRESSURE < 80 MM HG: ICD-10-PCS | Mod: CPTII,S$GLB,, | Performed by: PODIATRIST

## 2021-01-01 PROCEDURE — 1126F AMNT PAIN NOTED NONE PRSNT: CPT | Mod: CPTII,S$GLB,, | Performed by: PODIATRIST

## 2021-01-01 PROCEDURE — 3074F SYST BP LT 130 MM HG: CPT | Mod: S$GLB,,, | Performed by: FAMILY MEDICINE

## 2021-01-01 PROCEDURE — 80069 RENAL FUNCTION PANEL: CPT | Performed by: NURSE PRACTITIONER

## 2021-01-01 PROCEDURE — 1159F MED LIST DOCD IN RCRD: CPT | Mod: S$GLB,,, | Performed by: FAMILY MEDICINE

## 2021-01-01 PROCEDURE — 85610 PROTHROMBIN TIME: CPT | Performed by: STUDENT IN AN ORGANIZED HEALTH CARE EDUCATION/TRAINING PROGRAM

## 2021-01-01 PROCEDURE — 99214 OFFICE O/P EST MOD 30 MIN: CPT | Mod: S$GLB,,, | Performed by: INTERNAL MEDICINE

## 2021-01-01 PROCEDURE — 80053 COMPREHEN METABOLIC PANEL: CPT | Performed by: STUDENT IN AN ORGANIZED HEALTH CARE EDUCATION/TRAINING PROGRAM

## 2021-01-01 PROCEDURE — 85025 COMPLETE CBC W/AUTO DIFF WBC: CPT | Performed by: NURSE PRACTITIONER

## 2021-01-01 PROCEDURE — 80053 COMPREHEN METABOLIC PANEL: CPT | Performed by: NURSE PRACTITIONER

## 2021-01-01 PROCEDURE — 84484 ASSAY OF TROPONIN QUANT: CPT | Mod: 91 | Performed by: NURSE PRACTITIONER

## 2021-01-01 PROCEDURE — 25000003 PHARM REV CODE 250

## 2021-01-01 PROCEDURE — 3074F SYST BP LT 130 MM HG: CPT | Mod: CPTII,S$GLB,, | Performed by: PODIATRIST

## 2021-01-01 PROCEDURE — 63600175 PHARM REV CODE 636 W HCPCS: Performed by: STUDENT IN AN ORGANIZED HEALTH CARE EDUCATION/TRAINING PROGRAM

## 2021-01-01 PROCEDURE — 1159F PR MEDICATION LIST DOCUMENTED IN MEDICAL RECORD: ICD-10-PCS | Mod: CPTII,S$GLB,, | Performed by: PODIATRIST

## 2021-01-01 PROCEDURE — 1160F RVW MEDS BY RX/DR IN RCRD: CPT | Mod: CPTII,S$GLB,, | Performed by: PODIATRIST

## 2021-01-01 PROCEDURE — 3075F PR MOST RECENT SYSTOLIC BLOOD PRESS GE 130-139MM HG: ICD-10-PCS | Mod: CPTII,S$GLB,, | Performed by: INTERNAL MEDICINE

## 2021-01-01 PROCEDURE — 99214 PR OFFICE/OUTPT VISIT, EST, LEVL IV, 30-39 MIN: ICD-10-PCS | Mod: S$GLB,,, | Performed by: INTERNAL MEDICINE

## 2021-01-01 PROCEDURE — 3008F PR BODY MASS INDEX (BMI) DOCUMENTED: ICD-10-PCS | Mod: CPTII,S$GLB,, | Performed by: PODIATRIST

## 2021-01-01 PROCEDURE — 1126F AMNT PAIN NOTED NONE PRSNT: CPT | Mod: S$GLB,,, | Performed by: PODIATRIST

## 2021-01-01 PROCEDURE — 99285 EMERGENCY DEPT VISIT HI MDM: CPT | Mod: 25

## 2021-01-01 PROCEDURE — 1159F PR MEDICATION LIST DOCUMENTED IN MEDICAL RECORD: ICD-10-PCS | Mod: CPTII,S$GLB,, | Performed by: INTERNAL MEDICINE

## 2021-01-01 PROCEDURE — 43235 EGD DIAGNOSTIC BRUSH WASH: CPT | Performed by: INTERNAL MEDICINE

## 2021-01-01 PROCEDURE — 1160F PR REVIEW ALL MEDS BY PRESCRIBER/CLIN PHARMACIST DOCUMENTED: ICD-10-PCS | Mod: CPTII,S$GLB,, | Performed by: INTERNAL MEDICINE

## 2021-01-01 PROCEDURE — 3288F FALL RISK ASSESSMENT DOCD: CPT | Mod: CPTII,S$GLB,, | Performed by: PODIATRIST

## 2021-01-01 PROCEDURE — 84484 ASSAY OF TROPONIN QUANT: CPT | Performed by: EMERGENCY MEDICINE

## 2021-01-01 PROCEDURE — 1101F PT FALLS ASSESS-DOCD LE1/YR: CPT | Mod: CPTII,S$GLB,, | Performed by: INTERNAL MEDICINE

## 2021-01-01 PROCEDURE — 3074F PR MOST RECENT SYSTOLIC BLOOD PRESSURE < 130 MM HG: ICD-10-PCS | Mod: S$GLB,,, | Performed by: FAMILY MEDICINE

## 2021-01-01 PROCEDURE — G0378 HOSPITAL OBSERVATION PER HR: HCPCS | Mod: CS

## 2021-01-01 PROCEDURE — 1126F PR PAIN SEVERITY QUANTIFIED, NO PAIN PRESENT: ICD-10-PCS | Mod: CPTII,S$GLB,, | Performed by: PODIATRIST

## 2021-01-01 PROCEDURE — G0378 HOSPITAL OBSERVATION PER HR: HCPCS | Mod: 59,CS

## 2021-01-01 PROCEDURE — 80048 BASIC METABOLIC PNL TOTAL CA: CPT | Performed by: STUDENT IN AN ORGANIZED HEALTH CARE EDUCATION/TRAINING PROGRAM

## 2021-01-01 PROCEDURE — 85025 COMPLETE CBC W/AUTO DIFF WBC: CPT | Mod: 91 | Performed by: NURSE PRACTITIONER

## 2021-01-01 PROCEDURE — 84484 ASSAY OF TROPONIN QUANT: CPT | Mod: 91 | Performed by: EMERGENCY MEDICINE

## 2021-01-01 PROCEDURE — 1101F PR PT FALLS ASSESS DOC 0-1 FALLS W/OUT INJ PAST YR: ICD-10-PCS | Mod: CPTII,S$GLB,, | Performed by: INTERNAL MEDICINE

## 2021-01-01 PROCEDURE — 3078F PR MOST RECENT DIASTOLIC BLOOD PRESSURE < 80 MM HG: ICD-10-PCS | Mod: S$GLB,,, | Performed by: FAMILY MEDICINE

## 2021-01-01 PROCEDURE — 3066F PR DOCUMENTATION OF TREATMENT FOR NEPHROPATHY: ICD-10-PCS | Mod: CPTII,S$GLB,, | Performed by: PODIATRIST

## 2021-01-01 PROCEDURE — 1126F AMNT PAIN NOTED NONE PRSNT: CPT | Mod: CPTII,S$GLB,, | Performed by: INTERNAL MEDICINE

## 2021-01-01 PROCEDURE — 4010F PR ACE/ARB THEARPY RXD/TAKEN: ICD-10-PCS | Mod: CPTII,S$GLB,, | Performed by: PODIATRIST

## 2021-01-01 PROCEDURE — S0030 INJECTION, METRONIDAZOLE: HCPCS | Performed by: FAMILY MEDICINE

## 2021-01-01 PROCEDURE — 3288F FALL RISK ASSESSMENT DOCD: CPT | Mod: S$GLB,,, | Performed by: FAMILY MEDICINE

## 2021-01-01 PROCEDURE — 1101F PR PT FALLS ASSESS DOC 0-1 FALLS W/OUT INJ PAST YR: ICD-10-PCS | Mod: CPTII,S$GLB,, | Performed by: PODIATRIST

## 2021-01-01 RX ORDER — WARFARIN SODIUM 5 MG/1
5 TABLET ORAL DAILY
Qty: 30 TABLET | Refills: 0 | Status: ON HOLD | OUTPATIENT
Start: 2021-01-01 | End: 2022-01-01 | Stop reason: SDUPTHER

## 2021-01-01 RX ORDER — CLONIDINE HYDROCHLORIDE 0.1 MG/1
0.1 TABLET ORAL 2 TIMES DAILY PRN
Status: DISCONTINUED | OUTPATIENT
Start: 2021-01-01 | End: 2021-01-01 | Stop reason: HOSPADM

## 2021-01-01 RX ORDER — PANTOPRAZOLE SODIUM 40 MG/10ML
40 INJECTION, POWDER, LYOPHILIZED, FOR SOLUTION INTRAVENOUS 2 TIMES DAILY
Status: ACTIVE | OUTPATIENT
Start: 2021-01-01 | End: 2021-01-01

## 2021-01-01 RX ORDER — ISOSORBIDE MONONITRATE 30 MG/1
30 TABLET, EXTENDED RELEASE ORAL DAILY
Status: DISCONTINUED | OUTPATIENT
Start: 2021-01-01 | End: 2021-01-01 | Stop reason: HOSPADM

## 2021-01-01 RX ORDER — ALBUTEROL SULFATE 90 UG/1
1 AEROSOL, METERED RESPIRATORY (INHALATION) EVERY 4 HOURS PRN
Status: DISCONTINUED | OUTPATIENT
Start: 2021-01-01 | End: 2021-01-01 | Stop reason: HOSPADM

## 2021-01-01 RX ORDER — SODIUM CHLORIDE 9 MG/ML
INJECTION, SOLUTION INTRAVENOUS ONCE
Status: DISCONTINUED | OUTPATIENT
Start: 2021-01-01 | End: 2021-01-01

## 2021-01-01 RX ORDER — METRONIDAZOLE 500 MG/100ML
500 INJECTION, SOLUTION INTRAVENOUS
Status: DISCONTINUED | OUTPATIENT
Start: 2021-01-01 | End: 2021-01-01 | Stop reason: HOSPADM

## 2021-01-01 RX ORDER — PANTOPRAZOLE SODIUM 40 MG/10ML
80 INJECTION, POWDER, LYOPHILIZED, FOR SOLUTION INTRAVENOUS ONCE
Status: COMPLETED | OUTPATIENT
Start: 2021-01-01 | End: 2021-01-01

## 2021-01-01 RX ORDER — METOPROLOL SUCCINATE 25 MG/1
25 TABLET, EXTENDED RELEASE ORAL DAILY
Status: DISCONTINUED | OUTPATIENT
Start: 2021-01-01 | End: 2021-01-01

## 2021-01-01 RX ORDER — LEVOFLOXACIN 5 MG/ML
750 INJECTION, SOLUTION INTRAVENOUS ONCE
Status: COMPLETED | OUTPATIENT
Start: 2021-01-01 | End: 2021-01-01

## 2021-01-01 RX ORDER — DILTIAZEM HYDROCHLORIDE 30 MG/1
30 TABLET, FILM COATED ORAL
Status: DISCONTINUED | OUTPATIENT
Start: 2021-01-01 | End: 2021-01-01 | Stop reason: HOSPADM

## 2021-01-01 RX ORDER — ONDANSETRON 2 MG/ML
4 INJECTION INTRAMUSCULAR; INTRAVENOUS EVERY 8 HOURS PRN
Status: DISCONTINUED | OUTPATIENT
Start: 2021-01-01 | End: 2021-01-01 | Stop reason: HOSPADM

## 2021-01-01 RX ORDER — PANTOPRAZOLE SODIUM 40 MG/1
40 TABLET, DELAYED RELEASE ORAL
Status: DISCONTINUED | OUTPATIENT
Start: 2021-01-01 | End: 2021-01-01 | Stop reason: HOSPADM

## 2021-01-01 RX ORDER — BENZONATATE 100 MG/1
100 CAPSULE ORAL 3 TIMES DAILY PRN
Status: DISCONTINUED | OUTPATIENT
Start: 2021-01-01 | End: 2021-01-01 | Stop reason: HOSPADM

## 2021-01-01 RX ORDER — DILTIAZEM HYDROCHLORIDE 5 MG/ML
10 INJECTION INTRAVENOUS
Status: COMPLETED | OUTPATIENT
Start: 2021-01-01 | End: 2021-01-01

## 2021-01-01 RX ORDER — METOPROLOL SUCCINATE 25 MG/1
25 TABLET, EXTENDED RELEASE ORAL NIGHTLY
Qty: 30 TABLET | Refills: 3 | Status: ON HOLD | OUTPATIENT
Start: 2021-01-01 | End: 2022-01-01

## 2021-01-01 RX ORDER — SODIUM CHLORIDE 9 MG/ML
INJECTION, SOLUTION INTRAVENOUS ONCE
Status: DISCONTINUED | OUTPATIENT
Start: 2021-01-01 | End: 2021-01-01 | Stop reason: HOSPADM

## 2021-01-01 RX ORDER — MORPHINE SULFATE 2 MG/ML
1 INJECTION, SOLUTION INTRAMUSCULAR; INTRAVENOUS EVERY 6 HOURS PRN
Status: DISCONTINUED | OUTPATIENT
Start: 2021-01-01 | End: 2021-01-01 | Stop reason: HOSPADM

## 2021-01-01 RX ORDER — SODIUM CHLORIDE 9 MG/ML
INJECTION, SOLUTION INTRAVENOUS ONCE
Status: CANCELLED | OUTPATIENT
Start: 2021-01-01 | End: 2021-01-01

## 2021-01-01 RX ORDER — SODIUM CHLORIDE 9 MG/ML
INJECTION, SOLUTION INTRAVENOUS CONTINUOUS PRN
Status: DISCONTINUED | OUTPATIENT
Start: 2021-01-01 | End: 2021-01-01

## 2021-01-01 RX ORDER — METOPROLOL SUCCINATE 25 MG/1
25 TABLET, EXTENDED RELEASE ORAL NIGHTLY
Status: DISCONTINUED | OUTPATIENT
Start: 2021-01-01 | End: 2021-01-01 | Stop reason: HOSPADM

## 2021-01-01 RX ORDER — MUPIROCIN 20 MG/G
OINTMENT TOPICAL 2 TIMES DAILY
Status: DISCONTINUED | OUTPATIENT
Start: 2021-01-01 | End: 2021-01-01 | Stop reason: HOSPADM

## 2021-01-01 RX ORDER — DILTIAZEM HYDROCHLORIDE 30 MG/1
30 TABLET, FILM COATED ORAL EVERY 6 HOURS
Status: DISCONTINUED | OUTPATIENT
Start: 2021-01-01 | End: 2021-01-01 | Stop reason: HOSPADM

## 2021-01-01 RX ORDER — HYDRALAZINE HYDROCHLORIDE 25 MG/1
50 TABLET, FILM COATED ORAL EVERY 8 HOURS
Status: DISCONTINUED | OUTPATIENT
Start: 2021-01-01 | End: 2021-01-01 | Stop reason: HOSPADM

## 2021-01-01 RX ORDER — LEVALBUTEROL INHALATION SOLUTION 0.63 MG/3ML
1 SOLUTION RESPIRATORY (INHALATION) EVERY 8 HOURS
Status: DISCONTINUED | OUTPATIENT
Start: 2021-01-01 | End: 2021-01-01 | Stop reason: HOSPADM

## 2021-01-01 RX ORDER — LOSARTAN POTASSIUM 25 MG/1
25 TABLET ORAL NIGHTLY
Status: DISCONTINUED | OUTPATIENT
Start: 2021-01-01 | End: 2021-01-01

## 2021-01-01 RX ORDER — ASPIRIN 81 MG/1
81 TABLET ORAL DAILY
Status: DISCONTINUED | OUTPATIENT
Start: 2021-01-01 | End: 2021-01-01 | Stop reason: HOSPADM

## 2021-01-01 RX ORDER — PREDNISONE 20 MG/1
20 TABLET ORAL DAILY
Status: DISCONTINUED | OUTPATIENT
Start: 2021-01-01 | End: 2021-01-01

## 2021-01-01 RX ORDER — SODIUM CHLORIDE 0.9 % (FLUSH) 0.9 %
10 SYRINGE (ML) INJECTION
Status: DISCONTINUED | OUTPATIENT
Start: 2021-01-01 | End: 2021-01-01 | Stop reason: HOSPADM

## 2021-01-01 RX ORDER — LEVALBUTEROL INHALATION SOLUTION 0.63 MG/3ML
1 SOLUTION RESPIRATORY (INHALATION) EVERY 8 HOURS PRN
Status: DISCONTINUED | OUTPATIENT
Start: 2021-01-01 | End: 2021-01-01

## 2021-01-01 RX ORDER — BENZONATATE 100 MG/1
100 CAPSULE ORAL 3 TIMES DAILY PRN
Status: DISCONTINUED | OUTPATIENT
Start: 2021-01-01 | End: 2021-01-01

## 2021-01-01 RX ORDER — PANTOPRAZOLE SODIUM 40 MG/10ML
40 INJECTION, POWDER, LYOPHILIZED, FOR SOLUTION INTRAVENOUS 2 TIMES DAILY
Status: DISCONTINUED | OUTPATIENT
Start: 2021-01-01 | End: 2021-01-01

## 2021-01-01 RX ORDER — CARVEDILOL 25 MG/1
25 TABLET ORAL 2 TIMES DAILY
COMMUNITY
Start: 2021-01-01 | End: 2021-01-01

## 2021-01-01 RX ORDER — WARFARIN 2.5 MG/1
5 TABLET ORAL DAILY
Status: DISCONTINUED | OUTPATIENT
Start: 2021-01-01 | End: 2021-01-01 | Stop reason: HOSPADM

## 2021-01-01 RX ORDER — ENOXAPARIN SODIUM 100 MG/ML
1 INJECTION SUBCUTANEOUS ONCE
Status: DISCONTINUED | OUTPATIENT
Start: 2021-01-01 | End: 2021-01-01

## 2021-01-01 RX ORDER — LEVOFLOXACIN 5 MG/ML
500 INJECTION, SOLUTION INTRAVENOUS
Status: DISCONTINUED | OUTPATIENT
Start: 2021-01-01 | End: 2021-01-01 | Stop reason: HOSPADM

## 2021-01-01 RX ORDER — DIGOXIN 0.25 MG/ML
250 INJECTION INTRAMUSCULAR; INTRAVENOUS ONCE
Status: COMPLETED | OUTPATIENT
Start: 2021-01-01 | End: 2021-01-01

## 2021-01-01 RX ORDER — ONDANSETRON 2 MG/ML
4 INJECTION INTRAMUSCULAR; INTRAVENOUS EVERY 6 HOURS PRN
Status: DISCONTINUED | OUTPATIENT
Start: 2021-01-01 | End: 2021-01-01 | Stop reason: HOSPADM

## 2021-01-01 RX ORDER — ACETAMINOPHEN 325 MG/1
650 TABLET ORAL EVERY 8 HOURS PRN
Status: DISCONTINUED | OUTPATIENT
Start: 2021-01-01 | End: 2021-01-01 | Stop reason: HOSPADM

## 2021-01-01 RX ORDER — MORPHINE SULFATE 2 MG/ML
2 INJECTION, SOLUTION INTRAMUSCULAR; INTRAVENOUS EVERY 4 HOURS PRN
Status: DISCONTINUED | OUTPATIENT
Start: 2021-01-01 | End: 2021-01-01 | Stop reason: HOSPADM

## 2021-01-01 RX ORDER — LORAZEPAM 0.5 MG/1
0.5 TABLET ORAL EVERY 6 HOURS PRN
Status: DISCONTINUED | OUTPATIENT
Start: 2021-01-01 | End: 2021-01-01 | Stop reason: HOSPADM

## 2021-01-01 RX ORDER — WARFARIN 2.5 MG/1
2.5 TABLET ORAL DAILY
Status: DISCONTINUED | OUTPATIENT
Start: 2021-01-01 | End: 2021-01-01 | Stop reason: HOSPADM

## 2021-01-01 RX ORDER — METOPROLOL SUCCINATE 25 MG/1
25 TABLET, EXTENDED RELEASE ORAL DAILY
Status: DISCONTINUED | OUTPATIENT
Start: 2021-01-01 | End: 2021-01-01 | Stop reason: HOSPADM

## 2021-01-01 RX ORDER — BENZONATATE 100 MG/1
100 CAPSULE ORAL 3 TIMES DAILY PRN
COMMUNITY
End: 2021-01-01

## 2021-01-01 RX ORDER — WARFARIN 2.5 MG/1
5 TABLET ORAL DAILY
Status: DISCONTINUED | OUTPATIENT
Start: 2021-01-01 | End: 2021-01-01

## 2021-01-01 RX ORDER — CHLORHEXIDINE GLUCONATE ORAL RINSE 1.2 MG/ML
15 SOLUTION DENTAL 2 TIMES DAILY
Status: DISCONTINUED | OUTPATIENT
Start: 2021-01-01 | End: 2021-01-01 | Stop reason: HOSPADM

## 2021-01-01 RX ORDER — ONDANSETRON 4 MG/1
8 TABLET, ORALLY DISINTEGRATING ORAL EVERY 8 HOURS PRN
Status: DISCONTINUED | OUTPATIENT
Start: 2021-01-01 | End: 2021-01-01 | Stop reason: HOSPADM

## 2021-01-01 RX ORDER — WARFARIN SODIUM 5 MG/1
2.5-5 TABLET ORAL DAILY
Qty: 30 TABLET | Refills: 3 | Status: ON HOLD | OUTPATIENT
Start: 2021-01-01 | End: 2021-01-01 | Stop reason: HOSPADM

## 2021-01-01 RX ORDER — LOSARTAN POTASSIUM 25 MG/1
25 TABLET ORAL DAILY
Status: DISCONTINUED | OUTPATIENT
Start: 2021-01-01 | End: 2021-01-01 | Stop reason: HOSPADM

## 2021-01-01 RX ORDER — PROPOFOL 10 MG/ML
VIAL (ML) INTRAVENOUS
Status: DISCONTINUED | OUTPATIENT
Start: 2021-01-01 | End: 2021-01-01

## 2021-01-01 RX ORDER — ONDANSETRON 4 MG/1
4 TABLET, ORALLY DISINTEGRATING ORAL EVERY 6 HOURS PRN
Status: DISCONTINUED | OUTPATIENT
Start: 2021-01-01 | End: 2021-01-01 | Stop reason: HOSPADM

## 2021-01-01 RX ORDER — LEVALBUTEROL INHALATION SOLUTION 0.63 MG/3ML
SOLUTION RESPIRATORY (INHALATION)
Status: COMPLETED
Start: 2021-01-01 | End: 2021-01-01

## 2021-01-01 RX ORDER — WARFARIN 2.5 MG/1
2.5 TABLET ORAL DAILY
Qty: 30 TABLET | Refills: 11 | Status: ON HOLD | OUTPATIENT
Start: 2021-01-01 | End: 2021-01-01 | Stop reason: HOSPADM

## 2021-01-01 RX ORDER — SODIUM CHLORIDE 9 MG/ML
INJECTION, SOLUTION INTRAVENOUS
Status: DISCONTINUED | OUTPATIENT
Start: 2021-01-01 | End: 2021-01-01 | Stop reason: HOSPADM

## 2021-01-01 RX ORDER — WARFARIN 2.5 MG/1
2.5 TABLET ORAL DAILY
Status: DISCONTINUED | OUTPATIENT
Start: 2021-01-01 | End: 2021-01-01

## 2021-01-01 RX ORDER — PREDNISONE 20 MG/1
40 TABLET ORAL DAILY
Qty: 6 TABLET | Refills: 0 | Status: SHIPPED | OUTPATIENT
Start: 2021-01-01 | End: 2021-01-01

## 2021-01-01 RX ORDER — CALCIUM ACETATE 667 MG/1
667 CAPSULE ORAL DAILY
Status: DISCONTINUED | OUTPATIENT
Start: 2021-01-01 | End: 2021-01-01 | Stop reason: HOSPADM

## 2021-01-01 RX ORDER — SODIUM CHLORIDE 9 MG/ML
INJECTION, SOLUTION INTRAVENOUS
Status: DISCONTINUED | OUTPATIENT
Start: 2021-01-01 | End: 2021-01-01

## 2021-01-01 RX ORDER — CODEINE PHOSPHATE AND GUAIFENESIN 10; 100 MG/5ML; MG/5ML
5 SOLUTION ORAL 3 TIMES DAILY PRN
Qty: 118 ML | Refills: 0 | Status: SHIPPED | OUTPATIENT
Start: 2021-01-01 | End: 2021-01-01

## 2021-01-01 RX ORDER — NITROGLYCERIN 0.4 MG/1
0.4 TABLET SUBLINGUAL EVERY 5 MIN PRN
Status: DISCONTINUED | OUTPATIENT
Start: 2021-01-01 | End: 2021-01-01 | Stop reason: HOSPADM

## 2021-01-01 RX ORDER — DILTIAZEM HYDROCHLORIDE 30 MG/1
30 TABLET, FILM COATED ORAL
Qty: 120 TABLET | Refills: 11 | Status: SHIPPED | OUTPATIENT
Start: 2021-01-01 | End: 2022-01-01

## 2021-01-01 RX ORDER — DILTIAZEM HYDROCHLORIDE 180 MG/1
180 CAPSULE, COATED, EXTENDED RELEASE ORAL DAILY
Status: DISCONTINUED | OUTPATIENT
Start: 2021-01-01 | End: 2021-01-01

## 2021-01-01 RX ORDER — CODEINE PHOSPHATE AND GUAIFENESIN 10; 100 MG/5ML; MG/5ML
5 SOLUTION ORAL 3 TIMES DAILY PRN
Status: DISCONTINUED | OUTPATIENT
Start: 2021-01-01 | End: 2021-01-01 | Stop reason: HOSPADM

## 2021-01-01 RX ORDER — PREDNISONE 20 MG/1
40 TABLET ORAL DAILY
Status: DISCONTINUED | OUTPATIENT
Start: 2021-01-01 | End: 2021-01-01 | Stop reason: HOSPADM

## 2021-01-01 RX ORDER — BENZONATATE 100 MG/1
200 CAPSULE ORAL 3 TIMES DAILY PRN
Status: DISCONTINUED | OUTPATIENT
Start: 2021-01-01 | End: 2021-01-01 | Stop reason: HOSPADM

## 2021-01-01 RX ORDER — NALOXONE HCL 0.4 MG/ML
0.02 VIAL (ML) INJECTION
Status: DISCONTINUED | OUTPATIENT
Start: 2021-01-01 | End: 2021-01-01 | Stop reason: HOSPADM

## 2021-01-01 RX ORDER — METOPROLOL SUCCINATE 25 MG/1
25 TABLET, EXTENDED RELEASE ORAL NIGHTLY
Status: DISCONTINUED | OUTPATIENT
Start: 2021-01-01 | End: 2021-01-01

## 2021-01-01 RX ORDER — MUPIROCIN 20 MG/G
OINTMENT TOPICAL 2 TIMES DAILY
Status: DISCONTINUED | OUTPATIENT
Start: 2021-01-01 | End: 2021-01-01

## 2021-01-01 RX ORDER — PANTOPRAZOLE SODIUM 40 MG/1
40 TABLET, DELAYED RELEASE ORAL DAILY
Status: ON HOLD | COMMUNITY
Start: 2021-01-01 | End: 2022-01-01

## 2021-01-01 RX ORDER — ACETAMINOPHEN 325 MG/1
650 TABLET ORAL EVERY 4 HOURS PRN
Status: DISCONTINUED | OUTPATIENT
Start: 2021-01-01 | End: 2021-01-01 | Stop reason: HOSPADM

## 2021-01-01 RX ORDER — AMOXICILLIN 250 MG
1 CAPSULE ORAL 2 TIMES DAILY PRN
Status: DISCONTINUED | OUTPATIENT
Start: 2021-01-01 | End: 2021-01-01 | Stop reason: HOSPADM

## 2021-01-01 RX ORDER — DILTIAZEM HYDROCHLORIDE 180 MG/1
180 CAPSULE, COATED, EXTENDED RELEASE ORAL DAILY
Status: DISCONTINUED | OUTPATIENT
Start: 2021-01-01 | End: 2021-01-01 | Stop reason: HOSPADM

## 2021-01-01 RX ORDER — DEXTROSE 50 % IN WATER (D50W) INTRAVENOUS SYRINGE
25
Status: COMPLETED | OUTPATIENT
Start: 2021-01-01 | End: 2021-01-01

## 2021-01-01 RX ORDER — LEVALBUTEROL INHALATION SOLUTION 0.63 MG/3ML
1 SOLUTION RESPIRATORY (INHALATION) EVERY 8 HOURS PRN
Status: DISCONTINUED | OUTPATIENT
Start: 2021-01-01 | End: 2021-01-01 | Stop reason: HOSPADM

## 2021-01-01 RX ORDER — MAGNESIUM SULFATE HEPTAHYDRATE 40 MG/ML
2 INJECTION, SOLUTION INTRAVENOUS ONCE
Status: COMPLETED | OUTPATIENT
Start: 2021-01-01 | End: 2021-01-01

## 2021-01-01 RX ORDER — LEVOFLOXACIN 500 MG/1
500 TABLET, FILM COATED ORAL EVERY OTHER DAY
Qty: 5 TABLET | Refills: 0 | Status: SHIPPED | OUTPATIENT
Start: 2021-01-01 | End: 2021-01-01

## 2021-01-01 RX ORDER — SODIUM CHLORIDE 9 MG/ML
INJECTION, SOLUTION INTRAVENOUS
Status: CANCELLED | OUTPATIENT
Start: 2021-01-01

## 2021-01-01 RX ORDER — MAGNESIUM SULFATE 1 G/100ML
1 INJECTION INTRAVENOUS ONCE
Status: COMPLETED | OUTPATIENT
Start: 2021-01-01 | End: 2021-01-01

## 2021-01-01 RX ORDER — DILTIAZEM HYDROCHLORIDE 180 MG/1
180 CAPSULE, COATED, EXTENDED RELEASE ORAL DAILY
Status: ON HOLD | COMMUNITY
End: 2021-01-01 | Stop reason: HOSPADM

## 2021-01-01 RX ORDER — PREDNISONE 20 MG/1
20 TABLET ORAL DAILY
Qty: 5 TABLET | Refills: 0 | Status: SHIPPED | OUTPATIENT
Start: 2021-01-01 | End: 2021-01-01

## 2021-01-01 RX ORDER — LOSARTAN POTASSIUM 25 MG/1
25 TABLET ORAL NIGHTLY
Status: ON HOLD | COMMUNITY
End: 2022-01-01 | Stop reason: SDUPTHER

## 2021-01-01 RX ORDER — METRONIDAZOLE 500 MG/1
500 TABLET ORAL 3 TIMES DAILY
Qty: 30 TABLET | Refills: 0 | Status: SHIPPED | OUTPATIENT
Start: 2021-01-01 | End: 2021-01-01

## 2021-01-01 RX ORDER — METOPROLOL TARTRATE 25 MG/1
25 TABLET, FILM COATED ORAL 2 TIMES DAILY
Status: DISCONTINUED | OUTPATIENT
Start: 2021-01-01 | End: 2021-01-01

## 2021-01-01 RX ORDER — LIDOCAINE HYDROCHLORIDE 20 MG/ML
INJECTION, SOLUTION EPIDURAL; INFILTRATION; INTRACAUDAL; PERINEURAL
Status: DISCONTINUED | OUTPATIENT
Start: 2021-01-01 | End: 2021-01-01

## 2021-01-01 RX ORDER — ALBUTEROL SULFATE 0.83 MG/ML
2.5 SOLUTION RESPIRATORY (INHALATION) EVERY 4 HOURS PRN
Status: DISCONTINUED | OUTPATIENT
Start: 2021-01-01 | End: 2021-01-01

## 2021-01-01 RX ADMIN — ASPIRIN 81 MG: 81 TABLET, COATED ORAL at 09:11

## 2021-01-01 RX ADMIN — ISOSORBIDE MONONITRATE 30 MG: 30 TABLET, EXTENDED RELEASE ORAL at 09:06

## 2021-01-01 RX ADMIN — BENZONATATE 200 MG: 100 CAPSULE ORAL at 11:10

## 2021-01-01 RX ADMIN — LOSARTAN POTASSIUM 25 MG: 25 TABLET, FILM COATED ORAL at 09:11

## 2021-01-01 RX ADMIN — DEXTROSE MONOHYDRATE 25 G: 25 INJECTION, SOLUTION INTRAVENOUS at 12:11

## 2021-01-01 RX ADMIN — LOSARTAN POTASSIUM 25 MG: 25 TABLET, FILM COATED ORAL at 09:06

## 2021-01-01 RX ADMIN — LEVALBUTEROL HYDROCHLORIDE 0.63 MG: 0.63 SOLUTION RESPIRATORY (INHALATION) at 07:10

## 2021-01-01 RX ADMIN — HYDRALAZINE HYDROCHLORIDE 50 MG: 25 TABLET, FILM COATED ORAL at 03:11

## 2021-01-01 RX ADMIN — CALCIUM ACETATE 667 MG: 667 CAPSULE ORAL at 09:10

## 2021-01-01 RX ADMIN — METOPROLOL SUCCINATE 25 MG: 25 TABLET, FILM COATED, EXTENDED RELEASE ORAL at 10:06

## 2021-01-01 RX ADMIN — DILTIAZEM HYDROCHLORIDE 10 MG: 5 INJECTION INTRAVENOUS at 01:10

## 2021-01-01 RX ADMIN — DIGOXIN 250 MCG: 0.25 INJECTION INTRAMUSCULAR; INTRAVENOUS at 11:06

## 2021-01-01 RX ADMIN — DILTIAZEM HYDROCHLORIDE 30 MG: 30 TABLET, FILM COATED ORAL at 01:10

## 2021-01-01 RX ADMIN — ASPIRIN 81 MG: 81 TABLET, COATED ORAL at 09:10

## 2021-01-01 RX ADMIN — MAGNESIUM SULFATE 2 G: 2 INJECTION INTRAVENOUS at 03:11

## 2021-01-01 RX ADMIN — MUPIROCIN: 20 OINTMENT TOPICAL at 08:10

## 2021-01-01 RX ADMIN — METOPROLOL SUCCINATE 25 MG: 25 TABLET, EXTENDED RELEASE ORAL at 10:10

## 2021-01-01 RX ADMIN — PROPOFOL 30 MG: 10 INJECTION, EMULSION INTRAVENOUS at 02:06

## 2021-01-01 RX ADMIN — LEVALBUTEROL HYDROCHLORIDE 0.63 MG: 0.63 SOLUTION RESPIRATORY (INHALATION) at 05:10

## 2021-01-01 RX ADMIN — GUAIFENESIN AND CODEINE PHOSPHATE 5 ML: 100; 10 SOLUTION ORAL at 03:10

## 2021-01-01 RX ADMIN — LOSARTAN POTASSIUM 25 MG: 25 TABLET, FILM COATED ORAL at 08:10

## 2021-01-01 RX ADMIN — ASPIRIN 81 MG: 81 TABLET, COATED ORAL at 08:10

## 2021-01-01 RX ADMIN — MUPIROCIN: 20 OINTMENT TOPICAL at 09:10

## 2021-01-01 RX ADMIN — MAGNESIUM SULFATE 1 G: 1 INJECTION INTRAVENOUS at 12:06

## 2021-01-01 RX ADMIN — PREDNISONE 40 MG: 20 TABLET ORAL at 08:10

## 2021-01-01 RX ADMIN — ISOSORBIDE MONONITRATE 30 MG: 30 TABLET, EXTENDED RELEASE ORAL at 08:10

## 2021-01-01 RX ADMIN — METOPROLOL SUCCINATE 25 MG: 25 TABLET, FILM COATED, EXTENDED RELEASE ORAL at 08:10

## 2021-01-01 RX ADMIN — LOSARTAN POTASSIUM 25 MG: 25 TABLET, FILM COATED ORAL at 09:10

## 2021-01-01 RX ADMIN — LEVALBUTEROL HYDROCHLORIDE 0.63 MG: 0.63 SOLUTION RESPIRATORY (INHALATION) at 03:10

## 2021-01-01 RX ADMIN — PIPERACILLIN AND TAZOBACTAM 3.38 G: 3; .375 INJECTION, POWDER, LYOPHILIZED, FOR SOLUTION INTRAVENOUS; PARENTERAL at 12:11

## 2021-01-01 RX ADMIN — METOPROLOL SUCCINATE 25 MG: 25 TABLET, FILM COATED, EXTENDED RELEASE ORAL at 01:06

## 2021-01-01 RX ADMIN — CALCIUM ACETATE 667 MG: 667 CAPSULE ORAL at 08:10

## 2021-01-01 RX ADMIN — CALCIUM ACETATE 667 MG: 667 CAPSULE ORAL at 09:11

## 2021-01-01 RX ADMIN — ISOSORBIDE MONONITRATE 30 MG: 30 TABLET, EXTENDED RELEASE ORAL at 09:11

## 2021-01-01 RX ADMIN — HYDRALAZINE HYDROCHLORIDE 50 MG: 25 TABLET, FILM COATED ORAL at 05:06

## 2021-01-01 RX ADMIN — ISOSORBIDE MONONITRATE 30 MG: 30 TABLET, EXTENDED RELEASE ORAL at 09:10

## 2021-01-01 RX ADMIN — SODIUM CHLORIDE: 9 INJECTION, SOLUTION INTRAVENOUS at 02:06

## 2021-01-01 RX ADMIN — LORAZEPAM 0.5 MG: 0.5 TABLET ORAL at 09:06

## 2021-01-01 RX ADMIN — CHLORHEXIDINE GLUCONATE 15 ML: 1.2 RINSE ORAL at 09:06

## 2021-01-01 RX ADMIN — METRONIDAZOLE 500 MG: 500 INJECTION, SOLUTION INTRAVENOUS at 12:11

## 2021-01-01 RX ADMIN — WARFARIN SODIUM 5 MG: 2.5 TABLET ORAL at 04:11

## 2021-01-01 RX ADMIN — PREDNISONE 40 MG: 20 TABLET ORAL at 09:10

## 2021-01-01 RX ADMIN — MUPIROCIN: 20 OINTMENT TOPICAL at 11:11

## 2021-01-01 RX ADMIN — ONDANSETRON 4 MG: 4 TABLET, ORALLY DISINTEGRATING ORAL at 06:10

## 2021-01-01 RX ADMIN — LIDOCAINE HYDROCHLORIDE 60 MG: 20 INJECTION, SOLUTION EPIDURAL; INFILTRATION; INTRACAUDAL; PERINEURAL at 02:06

## 2021-01-01 RX ADMIN — DILTIAZEM HYDROCHLORIDE 30 MG: 30 TABLET, FILM COATED ORAL at 05:11

## 2021-01-01 RX ADMIN — DILTIAZEM HYDROCHLORIDE 180 MG: 180 CAPSULE, COATED, EXTENDED RELEASE ORAL at 07:06

## 2021-01-01 RX ADMIN — DILTIAZEM HYDROCHLORIDE 30 MG: 30 TABLET, FILM COATED ORAL at 11:11

## 2021-01-01 RX ADMIN — MUPIROCIN: 20 OINTMENT TOPICAL at 09:06

## 2021-01-01 RX ADMIN — LORAZEPAM 0.5 MG: 0.5 TABLET ORAL at 08:06

## 2021-01-01 RX ADMIN — LEVOFLOXACIN 750 MG: 750 INJECTION, SOLUTION INTRAVENOUS at 09:11

## 2021-01-01 RX ADMIN — DILTIAZEM HYDROCHLORIDE 5 MG/HR: 100 INJECTION, POWDER, LYOPHILIZED, FOR SOLUTION INTRAVENOUS at 01:10

## 2021-01-01 RX ADMIN — PROPOFOL 20 MG: 10 INJECTION, EMULSION INTRAVENOUS at 02:06

## 2021-01-01 RX ADMIN — DILTIAZEM HYDROCHLORIDE 15 MG/HR: 100 INJECTION, POWDER, LYOPHILIZED, FOR SOLUTION INTRAVENOUS at 02:10

## 2021-01-01 RX ADMIN — METOPROLOL SUCCINATE 25 MG: 25 TABLET, FILM COATED, EXTENDED RELEASE ORAL at 03:11

## 2021-01-01 RX ADMIN — METOPROLOL SUCCINATE 25 MG: 25 TABLET, FILM COATED, EXTENDED RELEASE ORAL at 09:10

## 2021-01-01 RX ADMIN — CALCIUM ACETATE 667 MG: 667 CAPSULE ORAL at 09:06

## 2021-01-01 RX ADMIN — LEVALBUTEROL HYDROCHLORIDE 0.63 MG: 0.63 SOLUTION RESPIRATORY (INHALATION) at 12:10

## 2021-01-01 RX ADMIN — PANTOPRAZOLE SODIUM 80 MG: 40 INJECTION, POWDER, LYOPHILIZED, FOR SOLUTION INTRAVENOUS at 12:06

## 2021-01-01 RX ADMIN — WARFARIN SODIUM 2.5 MG: 2.5 TABLET ORAL at 05:10

## 2021-01-01 NOTE — HOSPITAL COURSE
From: Pantera Sargent  To: Dafne Shankarjane  Sent: 2021 5:46 PM CST  Subject: Sick    This message is being sent by Mary Sargent on behalf of Pantera Sargent.    Pantera’s making a gargling sound that seems like he’s congested. It’s causing him to spit up quite a bit. Could this be him sick and what can I do?    Patient admitted with acute on chronic encephalopathy  Chronic encephalopathy with waxing and waning acute exacerbations has been going on for years   This time she got admitted from Dialysis center after staff there thought pt need to get admitted !?  Patient takes Percocet and ultram eventhough it was discussed multiple times before, not to do so   Patient has a history of Inpatient admission with Encephalopathy   almost every month last year   She has been extensively investigated from Neurology point of view, last year and all the tests were normal  She has chronic end-stage renal disease and on hemodialysis with calciphylaxis as complication  She also has significant electrolyte abnormalities      This Time she was also found to have V TACH in the background of chronic lyte abnormalities which are all chronic issue  Patient is DNR,She is not yet ready for hospice  This patient should not get admitted with chronic issues

## 2021-01-05 ENCOUNTER — TELEPHONE (OUTPATIENT)
Dept: CARDIOLOGY | Facility: CLINIC | Age: 76
End: 2021-01-05

## 2021-01-06 ENCOUNTER — DOCUMENT SCAN (OUTPATIENT)
Dept: HOME HEALTH SERVICES | Facility: HOSPITAL | Age: 76
End: 2021-01-06

## 2021-01-06 ENCOUNTER — DOCUMENT SCAN (OUTPATIENT)
Dept: HOME HEALTH SERVICES | Facility: HOSPITAL | Age: 76
End: 2021-01-06
Payer: MEDICARE

## 2021-01-07 ENCOUNTER — TELEPHONE (OUTPATIENT)
Dept: CARDIOLOGY | Facility: CLINIC | Age: 76
End: 2021-01-07
Payer: MEDICARE

## 2021-01-07 NOTE — TELEPHONE ENCOUNTER
----- Message from Rose Whitfield sent at 1/7/2021  8:35 AM CST -----  Regarding: appt  Annual check up   I advised pt hes leaving but she wanted to see what you ana rosa ssuggested       [phone] Sister Aide Watson

## 2021-01-07 NOTE — TELEPHONE ENCOUNTER
----- Message from Rose Whitfield sent at 1/7/2021  8:35 AM CST -----  Regarding: appt  Annual check up   I advised pt hes leaving but she wanted to see what you ana rosa ssuggested       [phone] Sister Aide Watson

## 2021-01-07 NOTE — TELEPHONE ENCOUNTER
Spoke with pt sister and informed her Dr. Rodriguez leaving and pt may want to see Dr. KESSLER since she knows the nurse for him when she saw DR. Barr. Will send message to Baylee.

## 2021-01-11 ENCOUNTER — TELEPHONE (OUTPATIENT)
Dept: CARDIOLOGY | Facility: CLINIC | Age: 76
End: 2021-01-11

## 2021-01-11 NOTE — TELEPHONE ENCOUNTER
----- Message from Rose Whitfield sent at 1/11/2021  3:33 PM CST -----  Regarding: refill  losartan (COZAAR) 25 MG tablet  warfarin (COUMADIN) 5 MG tablet>>>she says 2.5  isosorbide mononitrate (IMDUR) 30 MG 24 hr tablet      diltiaZEM (DILACOR XR) 240 MG CDCR<<<im showing sent November with 11 refills [facility] telling her they have no refills       [facility] [place]       [phone] Elvi sister   Please call her she is concered about her not having these meds

## 2021-01-12 ENCOUNTER — TELEPHONE (OUTPATIENT)
Dept: CARDIOLOGY | Facility: CLINIC | Age: 76
End: 2021-01-12

## 2021-01-12 RX ORDER — DILTIAZEM HYDROCHLORIDE 240 MG/1
240 CAPSULE, EXTENDED RELEASE ORAL DAILY
Qty: 90 CAPSULE | Refills: 3 | Status: SHIPPED | OUTPATIENT
Start: 2021-01-12 | End: 2021-03-25 | Stop reason: SDUPTHER

## 2021-01-12 RX ORDER — WARFARIN SODIUM 5 MG/1
2.5 TABLET ORAL DAILY
Qty: 45 TABLET | Refills: 3 | Status: SHIPPED | OUTPATIENT
Start: 2021-01-12 | End: 2021-01-01 | Stop reason: SDUPTHER

## 2021-01-12 RX ORDER — DILTIAZEM HYDROCHLORIDE 240 MG/1
240 CAPSULE, EXTENDED RELEASE ORAL DAILY
Qty: 90 CAPSULE | Refills: 3 | Status: SHIPPED | OUTPATIENT
Start: 2021-01-12 | End: 2021-03-16 | Stop reason: SDUPTHER

## 2021-01-12 RX ORDER — LOSARTAN POTASSIUM 25 MG/1
25 TABLET ORAL DAILY
Qty: 90 TABLET | Refills: 3 | Status: SHIPPED | OUTPATIENT
Start: 2021-01-12 | End: 2021-01-31 | Stop reason: SDUPTHER

## 2021-01-12 RX ORDER — LOSARTAN POTASSIUM 25 MG/1
25 TABLET ORAL DAILY
Qty: 90 TABLET | Refills: 3 | Status: SHIPPED | OUTPATIENT
Start: 2021-01-12 | End: 2021-01-31

## 2021-01-12 RX ORDER — ISOSORBIDE MONONITRATE 30 MG/1
30 TABLET, EXTENDED RELEASE ORAL DAILY
Qty: 90 TABLET | Refills: 3 | Status: ON HOLD | OUTPATIENT
Start: 2021-01-12 | End: 2022-01-01 | Stop reason: SDUPTHER

## 2021-01-26 ENCOUNTER — TELEPHONE (OUTPATIENT)
Dept: CARDIOLOGY | Facility: CLINIC | Age: 76
End: 2021-01-26

## 2021-01-27 ENCOUNTER — DOCUMENT SCAN (OUTPATIENT)
Dept: HOME HEALTH SERVICES | Facility: HOSPITAL | Age: 76
End: 2021-01-27
Payer: MEDICARE

## 2021-01-28 ENCOUNTER — TELEPHONE (OUTPATIENT)
Dept: CARDIOLOGY | Facility: CLINIC | Age: 76
End: 2021-01-28

## 2021-02-09 ENCOUNTER — TELEPHONE (OUTPATIENT)
Dept: CARDIOLOGY | Facility: CLINIC | Age: 76
End: 2021-02-09

## 2021-02-09 ENCOUNTER — DOCUMENT SCAN (OUTPATIENT)
Dept: HOME HEALTH SERVICES | Facility: HOSPITAL | Age: 76
End: 2021-02-09
Payer: MEDICARE

## 2021-02-10 ENCOUNTER — TELEPHONE (OUTPATIENT)
Dept: CARDIOLOGY | Facility: CLINIC | Age: 76
End: 2021-02-10

## 2021-02-11 PROBLEM — I48.11 LONGSTANDING PERSISTENT ATRIAL FIBRILLATION: Status: ACTIVE | Noted: 2017-07-18

## 2021-02-12 ENCOUNTER — TELEPHONE (OUTPATIENT)
Dept: FAMILY MEDICINE | Facility: CLINIC | Age: 76
End: 2021-02-12

## 2021-02-12 ENCOUNTER — OFFICE VISIT (OUTPATIENT)
Dept: CARDIOLOGY | Facility: CLINIC | Age: 76
End: 2021-02-12
Payer: MEDICARE

## 2021-02-12 VITALS
RESPIRATION RATE: 16 BRPM | WEIGHT: 145 LBS | DIASTOLIC BLOOD PRESSURE: 70 MMHG | HEART RATE: 78 BPM | HEIGHT: 65 IN | SYSTOLIC BLOOD PRESSURE: 130 MMHG | OXYGEN SATURATION: 94 % | BODY MASS INDEX: 24.16 KG/M2

## 2021-02-12 DIAGNOSIS — I10 ESSENTIAL HYPERTENSION: Chronic | ICD-10-CM

## 2021-02-12 DIAGNOSIS — R00.0 TACHYCARDIA: ICD-10-CM

## 2021-02-12 DIAGNOSIS — I51.3 LEFT ATRIAL THROMBUS: ICD-10-CM

## 2021-02-12 DIAGNOSIS — Z95.2 H/O MITRAL VALVE REPLACEMENT: Chronic | ICD-10-CM

## 2021-02-12 DIAGNOSIS — I50.22 CHRONIC SYSTOLIC HEART FAILURE: Primary | ICD-10-CM

## 2021-02-12 DIAGNOSIS — I48.11 LONGSTANDING PERSISTENT ATRIAL FIBRILLATION: ICD-10-CM

## 2021-02-12 DIAGNOSIS — E78.5 HYPERLIPIDEMIA, UNSPECIFIED HYPERLIPIDEMIA TYPE: Chronic | ICD-10-CM

## 2021-02-12 PROCEDURE — 3008F BODY MASS INDEX DOCD: CPT | Mod: CPTII,S$GLB,, | Performed by: INTERNAL MEDICINE

## 2021-02-12 PROCEDURE — 3008F PR BODY MASS INDEX (BMI) DOCUMENTED: ICD-10-PCS | Mod: CPTII,S$GLB,, | Performed by: INTERNAL MEDICINE

## 2021-02-12 PROCEDURE — 1159F MED LIST DOCD IN RCRD: CPT | Mod: S$GLB,,, | Performed by: INTERNAL MEDICINE

## 2021-02-12 PROCEDURE — 99214 PR OFFICE/OUTPT VISIT, EST, LEVL IV, 30-39 MIN: ICD-10-PCS | Mod: S$GLB,,, | Performed by: INTERNAL MEDICINE

## 2021-02-12 PROCEDURE — 99499 UNLISTED E&M SERVICE: CPT | Mod: S$GLB,,, | Performed by: INTERNAL MEDICINE

## 2021-02-12 PROCEDURE — 1126F AMNT PAIN NOTED NONE PRSNT: CPT | Mod: S$GLB,,, | Performed by: INTERNAL MEDICINE

## 2021-02-12 PROCEDURE — 99499 RISK ADDL DX/OHS AUDIT: ICD-10-PCS | Mod: S$GLB,,, | Performed by: INTERNAL MEDICINE

## 2021-02-12 PROCEDURE — 1126F PR PAIN SEVERITY QUANTIFIED, NO PAIN PRESENT: ICD-10-PCS | Mod: S$GLB,,, | Performed by: INTERNAL MEDICINE

## 2021-02-12 PROCEDURE — 1159F PR MEDICATION LIST DOCUMENTED IN MEDICAL RECORD: ICD-10-PCS | Mod: S$GLB,,, | Performed by: INTERNAL MEDICINE

## 2021-02-12 PROCEDURE — 99214 OFFICE O/P EST MOD 30 MIN: CPT | Mod: S$GLB,,, | Performed by: INTERNAL MEDICINE

## 2021-02-17 ENCOUNTER — TELEPHONE (OUTPATIENT)
Dept: CARDIOLOGY | Facility: CLINIC | Age: 76
End: 2021-02-17

## 2021-02-18 ENCOUNTER — TELEPHONE (OUTPATIENT)
Dept: CARDIOLOGY | Facility: CLINIC | Age: 76
End: 2021-02-18

## 2021-02-18 ENCOUNTER — DOCUMENT SCAN (OUTPATIENT)
Dept: HOME HEALTH SERVICES | Facility: HOSPITAL | Age: 76
End: 2021-02-18
Payer: MEDICARE

## 2021-02-21 ENCOUNTER — HOSPITAL ENCOUNTER (OUTPATIENT)
Facility: HOSPITAL | Age: 76
Discharge: LEFT BEFORE DISCHARGE | End: 2021-02-22
Attending: EMERGENCY MEDICINE | Admitting: INTERNAL MEDICINE
Payer: MEDICARE

## 2021-02-21 DIAGNOSIS — Z99.2 ESRD (END STAGE RENAL DISEASE) ON DIALYSIS: Primary | ICD-10-CM

## 2021-02-21 DIAGNOSIS — R00.1 BRADYCARDIA: ICD-10-CM

## 2021-02-21 DIAGNOSIS — R00.1 SYMPTOMATIC BRADYCARDIA: ICD-10-CM

## 2021-02-21 DIAGNOSIS — R06.00 DYSPNEA: ICD-10-CM

## 2021-02-21 DIAGNOSIS — N18.6 ESRD (END STAGE RENAL DISEASE) ON DIALYSIS: Primary | ICD-10-CM

## 2021-02-21 LAB
ALBUMIN SERPL BCP-MCNC: 3.3 G/DL (ref 3.5–5.2)
ALP SERPL-CCNC: 101 U/L (ref 55–135)
ALT SERPL W/O P-5'-P-CCNC: 11 U/L (ref 10–44)
ANION GAP SERPL CALC-SCNC: 19 MMOL/L (ref 8–16)
APTT PPP: 33.9 SEC (ref 23.6–33.3)
AST SERPL-CCNC: 20 U/L (ref 10–40)
BASOPHILS # BLD AUTO: 0.02 K/UL (ref 0–0.2)
BASOPHILS NFR BLD: 0.5 % (ref 0–1.9)
BILIRUB SERPL-MCNC: 0.5 MG/DL (ref 0.1–1)
BUN SERPL-MCNC: 65 MG/DL (ref 8–23)
CALCIUM SERPL-MCNC: 7.3 MG/DL (ref 8.7–10.5)
CHLORIDE SERPL-SCNC: 92 MMOL/L (ref 95–110)
CO2 SERPL-SCNC: 24 MMOL/L (ref 23–29)
CREAT SERPL-MCNC: 8.9 MG/DL (ref 0.5–1.4)
DIFFERENTIAL METHOD: ABNORMAL
EOSINOPHIL # BLD AUTO: 0.1 K/UL (ref 0–0.5)
EOSINOPHIL NFR BLD: 1.3 % (ref 0–8)
ERYTHROCYTE [DISTWIDTH] IN BLOOD BY AUTOMATED COUNT: 17.8 % (ref 11.5–14.5)
EST. GFR  (AFRICAN AMERICAN): 4.6 ML/MIN/1.73 M^2
EST. GFR  (NON AFRICAN AMERICAN): 4 ML/MIN/1.73 M^2
GLUCOSE SERPL-MCNC: 88 MG/DL (ref 70–110)
HCT VFR BLD AUTO: 40.3 % (ref 37–48.5)
HGB BLD-MCNC: 12.3 G/DL (ref 12–16)
IMM GRANULOCYTES # BLD AUTO: 0.01 K/UL (ref 0–0.04)
IMM GRANULOCYTES NFR BLD AUTO: 0.3 % (ref 0–0.5)
INR PPP: 1.3
LYMPHOCYTES # BLD AUTO: 0.6 K/UL (ref 1–4.8)
LYMPHOCYTES NFR BLD: 14.6 % (ref 18–48)
MAGNESIUM SERPL-MCNC: 1.8 MG/DL (ref 1.6–2.6)
MCH RBC QN AUTO: 26.3 PG (ref 27–31)
MCHC RBC AUTO-ENTMCNC: 30.5 G/DL (ref 32–36)
MCV RBC AUTO: 86 FL (ref 82–98)
MONOCYTES # BLD AUTO: 0.4 K/UL (ref 0.3–1)
MONOCYTES NFR BLD: 11.1 % (ref 4–15)
NEUTROPHILS # BLD AUTO: 2.9 K/UL (ref 1.8–7.7)
NEUTROPHILS NFR BLD: 72.2 % (ref 38–73)
NRBC BLD-RTO: 0 /100 WBC
PLATELET # BLD AUTO: 156 K/UL (ref 150–350)
PMV BLD AUTO: 11.2 FL (ref 9.2–12.9)
POTASSIUM SERPL-SCNC: 5 MMOL/L (ref 3.5–5.1)
PROT SERPL-MCNC: 8.1 G/DL (ref 6–8.4)
PROTHROMBIN TIME: 15.7 SEC (ref 10.6–14.8)
RBC # BLD AUTO: 4.67 M/UL (ref 4–5.4)
SARS-COV-2 RDRP RESP QL NAA+PROBE: NEGATIVE
SODIUM SERPL-SCNC: 135 MMOL/L (ref 136–145)
TROPONIN I SERPL DL<=0.01 NG/ML-MCNC: 0.04 NG/ML
WBC # BLD AUTO: 3.96 K/UL (ref 3.9–12.7)

## 2021-02-21 PROCEDURE — 27000221 HC OXYGEN, UP TO 24 HOURS

## 2021-02-21 PROCEDURE — 80053 COMPREHEN METABOLIC PANEL: CPT

## 2021-02-21 PROCEDURE — 84484 ASSAY OF TROPONIN QUANT: CPT

## 2021-02-21 PROCEDURE — 85025 COMPLETE CBC W/AUTO DIFF WBC: CPT

## 2021-02-21 PROCEDURE — U0002 COVID-19 LAB TEST NON-CDC: HCPCS

## 2021-02-21 PROCEDURE — 36415 COLL VENOUS BLD VENIPUNCTURE: CPT

## 2021-02-21 PROCEDURE — 99900031 HC PATIENT EDUCATION (STAT)

## 2021-02-21 PROCEDURE — 84484 ASSAY OF TROPONIN QUANT: CPT | Mod: 91

## 2021-02-21 PROCEDURE — 93010 ELECTROCARDIOGRAM REPORT: CPT | Mod: ,,, | Performed by: INTERNAL MEDICINE

## 2021-02-21 PROCEDURE — 99285 EMERGENCY DEPT VISIT HI MDM: CPT | Mod: 25

## 2021-02-21 PROCEDURE — 85730 THROMBOPLASTIN TIME PARTIAL: CPT

## 2021-02-21 PROCEDURE — 93010 EKG 12-LEAD: ICD-10-PCS | Mod: ,,, | Performed by: INTERNAL MEDICINE

## 2021-02-21 PROCEDURE — 93005 ELECTROCARDIOGRAM TRACING: CPT | Performed by: INTERNAL MEDICINE

## 2021-02-21 PROCEDURE — 85610 PROTHROMBIN TIME: CPT

## 2021-02-21 PROCEDURE — 25000003 PHARM REV CODE 250: Performed by: EMERGENCY MEDICINE

## 2021-02-21 PROCEDURE — G0378 HOSPITAL OBSERVATION PER HR: HCPCS

## 2021-02-21 PROCEDURE — 83735 ASSAY OF MAGNESIUM: CPT

## 2021-02-21 PROCEDURE — 99900035 HC TECH TIME PER 15 MIN (STAT)

## 2021-02-21 RX ORDER — ACETAMINOPHEN 325 MG/1
650 TABLET ORAL EVERY 8 HOURS PRN
Status: DISCONTINUED | OUTPATIENT
Start: 2021-02-21 | End: 2021-02-22 | Stop reason: HOSPADM

## 2021-02-21 RX ORDER — ONDANSETRON 2 MG/ML
4 INJECTION INTRAMUSCULAR; INTRAVENOUS EVERY 8 HOURS PRN
Status: DISCONTINUED | OUTPATIENT
Start: 2021-02-21 | End: 2021-02-22 | Stop reason: HOSPADM

## 2021-02-21 RX ORDER — IBUPROFEN 200 MG
1 TABLET ORAL DAILY
Status: DISCONTINUED | OUTPATIENT
Start: 2021-02-22 | End: 2021-02-22 | Stop reason: HOSPADM

## 2021-02-21 RX ORDER — ISOSORBIDE MONONITRATE 30 MG/1
30 TABLET, EXTENDED RELEASE ORAL DAILY
Status: DISCONTINUED | OUTPATIENT
Start: 2021-02-22 | End: 2021-02-22 | Stop reason: HOSPADM

## 2021-02-21 RX ORDER — SODIUM CHLORIDE 0.9 % (FLUSH) 0.9 %
10 SYRINGE (ML) INJECTION
Status: DISCONTINUED | OUTPATIENT
Start: 2021-02-21 | End: 2021-02-22 | Stop reason: HOSPADM

## 2021-02-21 RX ORDER — TALC
6 POWDER (GRAM) TOPICAL NIGHTLY PRN
Status: DISCONTINUED | OUTPATIENT
Start: 2021-02-21 | End: 2021-02-22 | Stop reason: HOSPADM

## 2021-02-21 RX ORDER — ALBUTEROL SULFATE 0.83 MG/ML
2.5 SOLUTION RESPIRATORY (INHALATION) EVERY 4 HOURS PRN
Refills: 1 | Status: DISCONTINUED | OUTPATIENT
Start: 2021-02-21 | End: 2021-02-22 | Stop reason: HOSPADM

## 2021-02-21 RX ORDER — LANOLIN ALCOHOL/MO/W.PET/CERES
400 CREAM (GRAM) TOPICAL DAILY
Status: DISCONTINUED | OUTPATIENT
Start: 2021-02-22 | End: 2021-02-22 | Stop reason: HOSPADM

## 2021-02-21 RX ORDER — ASPIRIN 325 MG
325 TABLET ORAL
Status: COMPLETED | OUTPATIENT
Start: 2021-02-21 | End: 2021-02-21

## 2021-02-21 RX ORDER — CALCIUM ACETATE 667 MG/1
667 CAPSULE ORAL DAILY
Status: DISCONTINUED | OUTPATIENT
Start: 2021-02-22 | End: 2021-02-22 | Stop reason: HOSPADM

## 2021-02-21 RX ORDER — PANTOPRAZOLE SODIUM 40 MG/1
40 TABLET, DELAYED RELEASE ORAL
Status: DISCONTINUED | OUTPATIENT
Start: 2021-02-22 | End: 2021-02-22 | Stop reason: HOSPADM

## 2021-02-21 RX ORDER — LEVALBUTEROL INHALATION SOLUTION 0.63 MG/3ML
1 SOLUTION RESPIRATORY (INHALATION) EVERY 8 HOURS PRN
Status: DISCONTINUED | OUTPATIENT
Start: 2021-02-21 | End: 2021-02-21

## 2021-02-21 RX ORDER — WARFARIN 2.5 MG/1
2.5 TABLET ORAL DAILY
Status: DISCONTINUED | OUTPATIENT
Start: 2021-02-22 | End: 2021-02-22 | Stop reason: HOSPADM

## 2021-02-21 RX ORDER — ZINC SULFATE 50(220)MG
220 CAPSULE ORAL DAILY
Status: DISCONTINUED | OUTPATIENT
Start: 2021-02-22 | End: 2021-02-22 | Stop reason: HOSPADM

## 2021-02-21 RX ADMIN — ASPIRIN 325 MG ORAL TABLET 325 MG: 325 PILL ORAL at 10:02

## 2021-02-22 ENCOUNTER — TELEPHONE (OUTPATIENT)
Dept: CARDIOLOGY | Facility: CLINIC | Age: 76
End: 2021-02-22

## 2021-02-22 ENCOUNTER — EXTERNAL HOME HEALTH (OUTPATIENT)
Dept: HOME HEALTH SERVICES | Facility: HOSPITAL | Age: 76
End: 2021-02-22

## 2021-02-22 VITALS
HEIGHT: 65 IN | RESPIRATION RATE: 21 BRPM | BODY MASS INDEX: 24.16 KG/M2 | WEIGHT: 145 LBS | TEMPERATURE: 99 F | SYSTOLIC BLOOD PRESSURE: 108 MMHG | OXYGEN SATURATION: 95 % | HEART RATE: 53 BPM | DIASTOLIC BLOOD PRESSURE: 53 MMHG

## 2021-02-22 LAB — TROPONIN I SERPL DL<=0.01 NG/ML-MCNC: 0.04 NG/ML

## 2021-02-22 PROCEDURE — G0378 HOSPITAL OBSERVATION PER HR: HCPCS

## 2021-02-24 ENCOUNTER — TELEPHONE (OUTPATIENT)
Dept: FAMILY MEDICINE | Facility: CLINIC | Age: 76
End: 2021-02-24

## 2021-03-01 NOTE — CARE UPDATE
06/14/20 1438   Patient Assessment/Suction   Level of Consciousness (AVPU) alert   Respiratory Effort Mild;Short of breath   All Lung Fields Breath Sounds Anterior:;clear   PRE-TX-O2   O2 Device (Oxygen Therapy) BiPAP   $ Is the patient on Low Flow Oxygen? Yes   Oxygen Concentration (%) 40   SpO2 98 %   Pulse Oximetry Type Continuous   Pulse 109   Resp (!) 28   BP (!) 157/73   Preset CPAP/BiPAP Settings   Mode Of Delivery BiPAP S/T   $ CPAP/BiPAP Daily Charge BiPAP/CPAP Daily   $ Initial CPAP/BiPAP Setup? Yes   Size of Mask Small   Sized Appropriately? Yes   Equipment Type V60   Humidifier full   Ipap 14   EPAP (cm H2O) 6   Pressure Support (cm H2O) 8   ITime (sec) 0.8   Rise Time (sec) 1   Patient CPAP/BiPAP Settings   RR Total (Breaths/Min) 32   Tidal Volume (mL) 434   VE Minute Ventilation (L/min) 13.1 L/min   Peak Inspiratory Pressure (cm H2O) 15   TiTOT (%) 43   Total Leak (L/Min) 21   Patient Trigger - ST Mode Only (%) 73   CPAP/BiPAP Backup Settings   IPAP Backup 14 cmH2O   EPAP Backup 6 cmH2O   Backup Rate 20 breaths per minute (bpm)   FIO2 Backup 40 %   CPAP/BiPAP Alarms   High Pressure (cm H2O) 40   Low Pressure (cm H2O) 10   Low Pressure Delay (Sec) 10   Minute Ventilation (L/Min) 3   High RR (breaths/min) 50   Low RR (breaths/min) 10   Apnea (Sec) 10   Respiratory Evaluation   $ Care Plan Tech Time 15 min   Evaluation For New Orders      See your MD for recheck as needed

## 2021-03-02 ENCOUNTER — OFFICE VISIT (OUTPATIENT)
Dept: FAMILY MEDICINE | Facility: CLINIC | Age: 76
End: 2021-03-02
Payer: MEDICARE

## 2021-03-02 VITALS
DIASTOLIC BLOOD PRESSURE: 74 MMHG | BODY MASS INDEX: 22.49 KG/M2 | WEIGHT: 135 LBS | HEIGHT: 65 IN | SYSTOLIC BLOOD PRESSURE: 126 MMHG

## 2021-03-02 DIAGNOSIS — E83.59 CALCIPHYLAXIS: ICD-10-CM

## 2021-03-02 DIAGNOSIS — R15.1 FECAL SMEARING: Primary | ICD-10-CM

## 2021-03-02 DIAGNOSIS — Z79.01 LONG TERM CURRENT USE OF ANTICOAGULANT: ICD-10-CM

## 2021-03-02 DIAGNOSIS — J31.0 RHINITIS, UNSPECIFIED TYPE: ICD-10-CM

## 2021-03-02 DIAGNOSIS — Z66 DNR (DO NOT RESUSCITATE): Chronic | ICD-10-CM

## 2021-03-02 DIAGNOSIS — J96.11 CHRONIC RESPIRATORY FAILURE WITH HYPOXIA: ICD-10-CM

## 2021-03-02 DIAGNOSIS — I48.11 LONGSTANDING PERSISTENT ATRIAL FIBRILLATION: ICD-10-CM

## 2021-03-02 DIAGNOSIS — J44.9 CHRONIC OBSTRUCTIVE PULMONARY DISEASE, UNSPECIFIED COPD TYPE: ICD-10-CM

## 2021-03-02 DIAGNOSIS — K21.9 GASTROESOPHAGEAL REFLUX DISEASE WITHOUT ESOPHAGITIS: ICD-10-CM

## 2021-03-02 DIAGNOSIS — I50.42 CHRONIC COMBINED SYSTOLIC AND DIASTOLIC CONGESTIVE HEART FAILURE: ICD-10-CM

## 2021-03-02 DIAGNOSIS — G25.2 INTENTION TREMOR: ICD-10-CM

## 2021-03-02 DIAGNOSIS — N18.6 ESRD (END STAGE RENAL DISEASE): Chronic | ICD-10-CM

## 2021-03-02 PROCEDURE — 99214 OFFICE O/P EST MOD 30 MIN: CPT | Mod: S$GLB,,, | Performed by: FAMILY MEDICINE

## 2021-03-02 PROCEDURE — 3008F BODY MASS INDEX DOCD: CPT | Mod: S$GLB,,, | Performed by: FAMILY MEDICINE

## 2021-03-02 PROCEDURE — 1159F MED LIST DOCD IN RCRD: CPT | Mod: S$GLB,,, | Performed by: FAMILY MEDICINE

## 2021-03-02 PROCEDURE — 99214 PR OFFICE/OUTPT VISIT, EST, LEVL IV, 30-39 MIN: ICD-10-PCS | Mod: S$GLB,,, | Performed by: FAMILY MEDICINE

## 2021-03-02 PROCEDURE — 3008F PR BODY MASS INDEX (BMI) DOCUMENTED: ICD-10-PCS | Mod: S$GLB,,, | Performed by: FAMILY MEDICINE

## 2021-03-02 PROCEDURE — 1159F PR MEDICATION LIST DOCUMENTED IN MEDICAL RECORD: ICD-10-PCS | Mod: S$GLB,,, | Performed by: FAMILY MEDICINE

## 2021-03-02 RX ORDER — MONTELUKAST SODIUM 10 MG/1
10 TABLET ORAL NIGHTLY
Qty: 90 TABLET | Refills: 1 | Status: SHIPPED | OUTPATIENT
Start: 2021-03-02 | End: 2021-04-01

## 2021-03-02 RX ORDER — MUPIROCIN 20 MG/G
OINTMENT TOPICAL 3 TIMES DAILY
Qty: 90 G | Refills: 2 | Status: SHIPPED | OUTPATIENT
Start: 2021-03-02 | End: 2021-01-01

## 2021-03-02 RX ORDER — PANTOPRAZOLE SODIUM 40 MG/1
40 TABLET, DELAYED RELEASE ORAL DAILY
Qty: 90 TABLET | Refills: 1 | Status: SHIPPED | OUTPATIENT
Start: 2021-03-02 | End: 2021-03-16 | Stop reason: ALTCHOICE

## 2021-03-09 ENCOUNTER — TELEPHONE (OUTPATIENT)
Dept: CARDIOLOGY | Facility: CLINIC | Age: 76
End: 2021-03-09

## 2021-03-10 RX ORDER — PANTOPRAZOLE SODIUM 40 MG/1
TABLET, DELAYED RELEASE ORAL
Qty: 90 TABLET | Refills: 0 | Status: SHIPPED | OUTPATIENT
Start: 2021-03-10 | End: 2021-03-16 | Stop reason: ALTCHOICE

## 2021-03-16 ENCOUNTER — OFFICE VISIT (OUTPATIENT)
Dept: CARDIOLOGY | Facility: CLINIC | Age: 76
End: 2021-03-16
Payer: MEDICARE

## 2021-03-16 VITALS
DIASTOLIC BLOOD PRESSURE: 60 MMHG | OXYGEN SATURATION: 95 % | BODY MASS INDEX: 22.49 KG/M2 | SYSTOLIC BLOOD PRESSURE: 116 MMHG | HEIGHT: 65 IN | HEART RATE: 50 BPM | RESPIRATION RATE: 16 BRPM | WEIGHT: 135 LBS

## 2021-03-16 DIAGNOSIS — Z95.2 H/O MITRAL VALVE REPLACEMENT: Chronic | ICD-10-CM

## 2021-03-16 DIAGNOSIS — I50.22 CHRONIC SYSTOLIC HEART FAILURE: ICD-10-CM

## 2021-03-16 DIAGNOSIS — R00.1 SYMPTOMATIC BRADYCARDIA: ICD-10-CM

## 2021-03-16 DIAGNOSIS — I10 ESSENTIAL HYPERTENSION: Primary | ICD-10-CM

## 2021-03-16 DIAGNOSIS — E78.00 PURE HYPERCHOLESTEROLEMIA: Chronic | ICD-10-CM

## 2021-03-16 PROCEDURE — 1101F PR PT FALLS ASSESS DOC 0-1 FALLS W/OUT INJ PAST YR: ICD-10-PCS | Mod: CPTII,S$GLB,, | Performed by: INTERNAL MEDICINE

## 2021-03-16 PROCEDURE — 3008F PR BODY MASS INDEX (BMI) DOCUMENTED: ICD-10-PCS | Mod: CPTII,S$GLB,, | Performed by: INTERNAL MEDICINE

## 2021-03-16 PROCEDURE — 1126F AMNT PAIN NOTED NONE PRSNT: CPT | Mod: S$GLB,,, | Performed by: INTERNAL MEDICINE

## 2021-03-16 PROCEDURE — 99214 PR OFFICE/OUTPT VISIT, EST, LEVL IV, 30-39 MIN: ICD-10-PCS | Mod: S$GLB,,, | Performed by: INTERNAL MEDICINE

## 2021-03-16 PROCEDURE — 93000 EKG 12-LEAD: ICD-10-PCS | Mod: S$GLB,,, | Performed by: SPECIALIST

## 2021-03-16 PROCEDURE — 3288F FALL RISK ASSESSMENT DOCD: CPT | Mod: CPTII,S$GLB,, | Performed by: INTERNAL MEDICINE

## 2021-03-16 PROCEDURE — 1126F PR PAIN SEVERITY QUANTIFIED, NO PAIN PRESENT: ICD-10-PCS | Mod: S$GLB,,, | Performed by: INTERNAL MEDICINE

## 2021-03-16 PROCEDURE — 1159F MED LIST DOCD IN RCRD: CPT | Mod: S$GLB,,, | Performed by: INTERNAL MEDICINE

## 2021-03-16 PROCEDURE — 3008F BODY MASS INDEX DOCD: CPT | Mod: CPTII,S$GLB,, | Performed by: INTERNAL MEDICINE

## 2021-03-16 PROCEDURE — 1101F PT FALLS ASSESS-DOCD LE1/YR: CPT | Mod: CPTII,S$GLB,, | Performed by: INTERNAL MEDICINE

## 2021-03-16 PROCEDURE — 3288F PR FALLS RISK ASSESSMENT DOCUMENTED: ICD-10-PCS | Mod: CPTII,S$GLB,, | Performed by: INTERNAL MEDICINE

## 2021-03-16 PROCEDURE — 93000 ELECTROCARDIOGRAM COMPLETE: CPT | Mod: S$GLB,,, | Performed by: SPECIALIST

## 2021-03-16 PROCEDURE — 1159F PR MEDICATION LIST DOCUMENTED IN MEDICAL RECORD: ICD-10-PCS | Mod: S$GLB,,, | Performed by: INTERNAL MEDICINE

## 2021-03-16 PROCEDURE — 99214 OFFICE O/P EST MOD 30 MIN: CPT | Mod: S$GLB,,, | Performed by: INTERNAL MEDICINE

## 2021-03-16 RX ORDER — ASPIRIN 81 MG/1
81 TABLET ORAL DAILY
COMMUNITY

## 2021-03-25 RX ORDER — DILTIAZEM HYDROCHLORIDE 240 MG/1
240 CAPSULE, EXTENDED RELEASE ORAL DAILY
Qty: 90 CAPSULE | Refills: 3 | Status: SHIPPED | OUTPATIENT
Start: 2021-03-25 | End: 2021-04-09 | Stop reason: SDUPTHER

## 2021-03-30 ENCOUNTER — OFFICE VISIT (OUTPATIENT)
Dept: PODIATRY | Facility: CLINIC | Age: 76
End: 2021-03-30
Payer: MEDICARE

## 2021-03-30 VITALS
WEIGHT: 135 LBS | HEIGHT: 65 IN | SYSTOLIC BLOOD PRESSURE: 135 MMHG | BODY MASS INDEX: 22.49 KG/M2 | DIASTOLIC BLOOD PRESSURE: 73 MMHG | HEART RATE: 68 BPM

## 2021-03-30 DIAGNOSIS — E83.9 CHRONIC KIDNEY DISEASE-MINERAL AND BONE DISORDER: Primary | ICD-10-CM

## 2021-03-30 DIAGNOSIS — G89.29 CHRONIC TOE PAIN, LEFT FOOT: ICD-10-CM

## 2021-03-30 DIAGNOSIS — M89.9 CHRONIC KIDNEY DISEASE-MINERAL AND BONE DISORDER: Primary | ICD-10-CM

## 2021-03-30 DIAGNOSIS — I73.9 PVD (PERIPHERAL VASCULAR DISEASE): Chronic | ICD-10-CM

## 2021-03-30 DIAGNOSIS — E83.59 CALCIPHYLAXIS: ICD-10-CM

## 2021-03-30 DIAGNOSIS — N18.9 CHRONIC KIDNEY DISEASE-MINERAL AND BONE DISORDER: Primary | ICD-10-CM

## 2021-03-30 DIAGNOSIS — L60.2 OG (ONYCHOGRYPHOSIS): ICD-10-CM

## 2021-03-30 DIAGNOSIS — M79.675 CHRONIC TOE PAIN, LEFT FOOT: ICD-10-CM

## 2021-03-30 DIAGNOSIS — N18.6 ESRD (END STAGE RENAL DISEASE): Chronic | ICD-10-CM

## 2021-03-30 PROCEDURE — 3288F PR FALLS RISK ASSESSMENT DOCUMENTED: ICD-10-PCS | Mod: CPTII,S$GLB,, | Performed by: PODIATRIST

## 2021-03-30 PROCEDURE — 11721 PR DEBRIDEMENT OF NAILS, 6 OR MORE: ICD-10-PCS | Mod: Q8,S$GLB,, | Performed by: PODIATRIST

## 2021-03-30 PROCEDURE — 99499 NO LOS: ICD-10-PCS | Mod: S$GLB,,, | Performed by: PODIATRIST

## 2021-03-30 PROCEDURE — 99499 UNLISTED E&M SERVICE: CPT | Mod: S$GLB,,, | Performed by: PODIATRIST

## 2021-03-30 PROCEDURE — 1125F AMNT PAIN NOTED PAIN PRSNT: CPT | Mod: S$GLB,,, | Performed by: PODIATRIST

## 2021-03-30 PROCEDURE — 11721 DEBRIDE NAIL 6 OR MORE: CPT | Mod: Q8,S$GLB,, | Performed by: PODIATRIST

## 2021-03-30 PROCEDURE — 1101F PR PT FALLS ASSESS DOC 0-1 FALLS W/OUT INJ PAST YR: ICD-10-PCS | Mod: CPTII,S$GLB,, | Performed by: PODIATRIST

## 2021-03-30 PROCEDURE — 3008F PR BODY MASS INDEX (BMI) DOCUMENTED: ICD-10-PCS | Mod: CPTII,S$GLB,, | Performed by: PODIATRIST

## 2021-03-30 PROCEDURE — 3008F BODY MASS INDEX DOCD: CPT | Mod: CPTII,S$GLB,, | Performed by: PODIATRIST

## 2021-03-30 PROCEDURE — 1125F PR PAIN SEVERITY QUANTIFIED, PAIN PRESENT: ICD-10-PCS | Mod: S$GLB,,, | Performed by: PODIATRIST

## 2021-03-30 PROCEDURE — 1101F PT FALLS ASSESS-DOCD LE1/YR: CPT | Mod: CPTII,S$GLB,, | Performed by: PODIATRIST

## 2021-03-30 PROCEDURE — 3288F FALL RISK ASSESSMENT DOCD: CPT | Mod: CPTII,S$GLB,, | Performed by: PODIATRIST

## 2021-03-30 RX ORDER — LEVOFLOXACIN 250 MG/1
TABLET ORAL
COMMUNITY
Start: 2021-03-26 | End: 2021-01-01

## 2021-03-30 RX ORDER — SEVELAMER CARBONATE 800 MG/1
2 TABLET, FILM COATED ORAL
COMMUNITY
Start: 2021-02-16 | End: 2021-01-01

## 2021-04-09 RX ORDER — DILTIAZEM HYDROCHLORIDE 180 MG/1
CAPSULE, COATED, EXTENDED RELEASE ORAL
Qty: 90 CAPSULE | Refills: 0 | Status: SHIPPED | OUTPATIENT
Start: 2021-04-09 | End: 2021-01-01 | Stop reason: CLARIF

## 2021-04-12 ENCOUNTER — DOCUMENT SCAN (OUTPATIENT)
Dept: HOME HEALTH SERVICES | Facility: HOSPITAL | Age: 76
End: 2021-04-12
Payer: MEDICARE

## 2021-04-13 ENCOUNTER — TELEPHONE (OUTPATIENT)
Dept: FAMILY MEDICINE | Facility: CLINIC | Age: 76
End: 2021-04-13

## 2021-04-14 DIAGNOSIS — R10.9 STOMACH ACHE: Primary | ICD-10-CM

## 2021-04-15 ENCOUNTER — LAB VISIT (OUTPATIENT)
Dept: LAB | Facility: HOSPITAL | Age: 76
End: 2021-04-15
Attending: INTERNAL MEDICINE
Payer: MEDICARE

## 2021-04-15 DIAGNOSIS — D64.9 ANEMIA, UNSPECIFIED: Primary | ICD-10-CM

## 2021-04-15 LAB
ALBUMIN SERPL BCP-MCNC: 3.6 G/DL (ref 3.5–5.2)
ALP SERPL-CCNC: 79 U/L (ref 55–135)
ALT SERPL W/O P-5'-P-CCNC: 8 U/L (ref 10–44)
ANION GAP SERPL CALC-SCNC: 12 MMOL/L (ref 8–16)
AST SERPL-CCNC: 18 U/L (ref 10–40)
BILIRUB SERPL-MCNC: 1 MG/DL (ref 0.1–1)
BUN SERPL-MCNC: 31 MG/DL (ref 8–23)
CALCIUM SERPL-MCNC: 8.3 MG/DL (ref 8.7–10.5)
CHLORIDE SERPL-SCNC: 92 MMOL/L (ref 95–110)
CO2 SERPL-SCNC: 30 MMOL/L (ref 23–29)
CREAT SERPL-MCNC: 5.3 MG/DL (ref 0.5–1.4)
ERYTHROCYTE [DISTWIDTH] IN BLOOD BY AUTOMATED COUNT: 19.9 % (ref 11.5–14.5)
EST. GFR  (AFRICAN AMERICAN): 8.5 ML/MIN/1.73 M^2
EST. GFR  (NON AFRICAN AMERICAN): 7.4 ML/MIN/1.73 M^2
GLUCOSE SERPL-MCNC: 73 MG/DL (ref 70–110)
HCT VFR BLD AUTO: 31.8 % (ref 37–48.5)
HGB BLD-MCNC: 9.6 G/DL (ref 12–16)
LIPASE SERPL-CCNC: 51 U/L (ref 4–60)
MCH RBC QN AUTO: 27.3 PG (ref 27–31)
MCHC RBC AUTO-ENTMCNC: 30.2 G/DL (ref 32–36)
MCV RBC AUTO: 90 FL (ref 82–98)
PLATELET # BLD AUTO: 193 K/UL (ref 150–450)
PMV BLD AUTO: 11.3 FL (ref 9.2–12.9)
POTASSIUM SERPL-SCNC: 5 MMOL/L (ref 3.5–5.1)
PROT SERPL-MCNC: 8.3 G/DL (ref 6–8.4)
RBC # BLD AUTO: 3.52 M/UL (ref 4–5.4)
SODIUM SERPL-SCNC: 134 MMOL/L (ref 136–145)
WBC # BLD AUTO: 3.59 K/UL (ref 3.9–12.7)

## 2021-04-15 PROCEDURE — 36415 COLL VENOUS BLD VENIPUNCTURE: CPT | Performed by: INTERNAL MEDICINE

## 2021-04-15 PROCEDURE — 85027 COMPLETE CBC AUTOMATED: CPT | Performed by: INTERNAL MEDICINE

## 2021-04-15 PROCEDURE — 83690 ASSAY OF LIPASE: CPT | Performed by: INTERNAL MEDICINE

## 2021-04-15 PROCEDURE — 80053 COMPREHEN METABOLIC PANEL: CPT | Performed by: INTERNAL MEDICINE

## 2021-04-16 ENCOUNTER — TELEPHONE (OUTPATIENT)
Dept: CARDIOLOGY | Facility: CLINIC | Age: 76
End: 2021-04-16

## 2021-04-26 ENCOUNTER — LAB VISIT (OUTPATIENT)
Dept: LAB | Facility: HOSPITAL | Age: 76
End: 2021-04-26
Attending: INTERNAL MEDICINE
Payer: MEDICARE

## 2021-04-26 ENCOUNTER — EXTERNAL HOME HEALTH (OUTPATIENT)
Dept: HOME HEALTH SERVICES | Facility: HOSPITAL | Age: 76
End: 2021-04-26

## 2021-04-26 ENCOUNTER — DOCUMENT SCAN (OUTPATIENT)
Dept: HOME HEALTH SERVICES | Facility: HOSPITAL | Age: 76
End: 2021-04-26
Payer: MEDICARE

## 2021-04-26 DIAGNOSIS — N18.6 END STAGE RENAL DISEASE: Primary | ICD-10-CM

## 2021-04-26 LAB
ALBUMIN SERPL BCP-MCNC: 3.9 G/DL (ref 3.5–5.2)
ALP SERPL-CCNC: 96 U/L (ref 55–135)
ALT SERPL W/O P-5'-P-CCNC: 11 U/L (ref 10–44)
AMYLASE SERPL-CCNC: 276 U/L (ref 20–110)
AST SERPL-CCNC: 26 U/L (ref 10–40)
BILIRUB DIRECT SERPL-MCNC: 0.3 MG/DL (ref 0.1–0.3)
BILIRUB SERPL-MCNC: 0.7 MG/DL (ref 0.1–1)
LIPASE SERPL-CCNC: 64 U/L (ref 4–60)
PROT SERPL-MCNC: 9.1 G/DL (ref 6–8.4)

## 2021-04-26 PROCEDURE — 82150 ASSAY OF AMYLASE: CPT | Performed by: INTERNAL MEDICINE

## 2021-04-26 PROCEDURE — 80076 HEPATIC FUNCTION PANEL: CPT | Performed by: INTERNAL MEDICINE

## 2021-04-26 PROCEDURE — 83690 ASSAY OF LIPASE: CPT | Performed by: INTERNAL MEDICINE

## 2021-04-26 PROCEDURE — 36415 COLL VENOUS BLD VENIPUNCTURE: CPT | Performed by: INTERNAL MEDICINE

## 2021-04-27 ENCOUNTER — HOSPITAL ENCOUNTER (OUTPATIENT)
Dept: RADIOLOGY | Facility: HOSPITAL | Age: 76
Discharge: HOME | End: 2021-04-27
Attending: INTERNAL MEDICINE
Payer: MEDICARE

## 2021-04-27 DIAGNOSIS — R10.9 STOMACH ACHE: ICD-10-CM

## 2021-04-27 PROCEDURE — 74176 CT ABD & PELVIS W/O CONTRAST: CPT | Mod: TC,PO

## 2021-04-28 ENCOUNTER — DOCUMENT SCAN (OUTPATIENT)
Dept: HOME HEALTH SERVICES | Facility: HOSPITAL | Age: 76
End: 2021-04-28
Payer: MEDICARE

## 2021-04-29 ENCOUNTER — TELEPHONE (OUTPATIENT)
Dept: CARDIOLOGY | Facility: CLINIC | Age: 76
End: 2021-04-29

## 2021-05-04 ENCOUNTER — DOCUMENT SCAN (OUTPATIENT)
Dept: HOME HEALTH SERVICES | Facility: HOSPITAL | Age: 76
End: 2021-05-04
Payer: MEDICARE

## 2021-05-06 ENCOUNTER — TELEPHONE (OUTPATIENT)
Dept: CARDIOLOGY | Facility: CLINIC | Age: 76
End: 2021-05-06

## 2021-05-19 PROBLEM — Z79.01 LONG TERM (CURRENT) USE OF ANTICOAGULANTS: Status: ACTIVE | Noted: 2021-01-01

## 2021-06-28 PROBLEM — K92.2 GASTROINTESTINAL HEMORRHAGE: Status: ACTIVE | Noted: 2021-01-01

## 2021-10-13 PROBLEM — I48.91 ATRIAL FIBRILLATION WITH RAPID VENTRICULAR RESPONSE: Status: ACTIVE | Noted: 2021-01-01

## 2021-11-20 PROBLEM — I50.22 CHRONIC SYSTOLIC HEART FAILURE: Chronic | Status: ACTIVE | Noted: 2020-02-03

## 2021-11-20 PROBLEM — K57.92 ACUTE DIVERTICULITIS: Status: ACTIVE | Noted: 2021-01-01

## 2021-11-20 PROBLEM — I48.91 ATRIAL FIBRILLATION: Chronic | Status: ACTIVE | Noted: 2021-01-01

## 2021-11-20 PROBLEM — J96.11 CHRONIC HYPOXEMIC RESPIRATORY FAILURE: Chronic | Status: ACTIVE | Noted: 2021-01-01

## 2021-11-20 PROBLEM — J44.9 COPD (CHRONIC OBSTRUCTIVE PULMONARY DISEASE): Status: ACTIVE | Noted: 2019-09-29

## 2021-11-20 PROBLEM — J44.9 COPD (CHRONIC OBSTRUCTIVE PULMONARY DISEASE): Chronic | Status: ACTIVE | Noted: 2019-09-29

## 2021-11-20 PROBLEM — K57.90 DIVERTICULOSIS: Chronic | Status: ACTIVE | Noted: 2021-01-01

## 2022-01-01 ENCOUNTER — ANTI-COAG VISIT (OUTPATIENT)
Dept: CARDIOLOGY | Facility: CLINIC | Age: 77
End: 2022-01-01
Payer: MEDICARE

## 2022-01-01 ENCOUNTER — DOCUMENT SCAN (OUTPATIENT)
Dept: HOME HEALTH SERVICES | Facility: HOSPITAL | Age: 77
End: 2022-01-01
Payer: MEDICARE

## 2022-01-01 ENCOUNTER — HOSPITAL ENCOUNTER (INPATIENT)
Facility: HOSPITAL | Age: 77
LOS: 3 days | Discharge: HOME | DRG: 438 | End: 2022-01-27
Attending: EMERGENCY MEDICINE | Admitting: FAMILY MEDICINE
Payer: MEDICARE

## 2022-01-01 ENCOUNTER — TELEPHONE (OUTPATIENT)
Dept: FAMILY MEDICINE | Facility: CLINIC | Age: 77
End: 2022-01-01
Payer: MEDICARE

## 2022-01-01 ENCOUNTER — EXTERNAL HOME HEALTH (OUTPATIENT)
Dept: HOME HEALTH SERVICES | Facility: HOSPITAL | Age: 77
End: 2022-01-01

## 2022-01-01 ENCOUNTER — OFFICE VISIT (OUTPATIENT)
Dept: FAMILY MEDICINE | Facility: CLINIC | Age: 77
End: 2022-01-01
Payer: MEDICARE

## 2022-01-01 ENCOUNTER — HOSPITAL ENCOUNTER (INPATIENT)
Facility: HOSPITAL | Age: 77
LOS: 2 days | Discharge: HOME HEALTH | DRG: 308 | End: 2022-03-04
Attending: EMERGENCY MEDICINE
Payer: MEDICARE

## 2022-01-01 ENCOUNTER — CLINICAL SUPPORT (OUTPATIENT)
Dept: CARDIOLOGY | Facility: HOSPITAL | Age: 77
DRG: 291 | End: 2022-01-01
Payer: MEDICARE

## 2022-01-01 ENCOUNTER — HOSPITAL ENCOUNTER (OUTPATIENT)
Facility: HOSPITAL | Age: 77
Discharge: HOME HEALTH | End: 2022-04-02
Attending: EMERGENCY MEDICINE | Admitting: INTERNAL MEDICINE
Payer: MEDICARE

## 2022-01-01 ENCOUNTER — HOSPITAL ENCOUNTER (INPATIENT)
Facility: HOSPITAL | Age: 77
LOS: 2 days | Discharge: HOME HEALTH | DRG: 291 | End: 2022-04-28
Attending: EMERGENCY MEDICINE | Admitting: INTERNAL MEDICINE
Payer: MEDICARE

## 2022-01-01 ENCOUNTER — TELEPHONE (OUTPATIENT)
Dept: CARDIOLOGY | Facility: CLINIC | Age: 77
End: 2022-01-01
Payer: MEDICARE

## 2022-01-01 ENCOUNTER — OFFICE VISIT (OUTPATIENT)
Dept: CARDIOLOGY | Facility: CLINIC | Age: 77
End: 2022-01-01
Payer: MEDICARE

## 2022-01-01 ENCOUNTER — HOSPITAL ENCOUNTER (EMERGENCY)
Facility: HOSPITAL | Age: 77
End: 2022-05-07
Attending: EMERGENCY MEDICINE
Payer: MEDICARE

## 2022-01-01 ENCOUNTER — TELEPHONE (OUTPATIENT)
Dept: PHARMACY | Facility: CLINIC | Age: 77
End: 2022-01-01
Payer: MEDICARE

## 2022-01-01 ENCOUNTER — HOSPITAL ENCOUNTER (EMERGENCY)
Facility: HOSPITAL | Age: 77
Discharge: HOME | End: 2022-05-06
Attending: EMERGENCY MEDICINE
Payer: MEDICARE

## 2022-01-01 ENCOUNTER — EXTERNAL HOME HEALTH (OUTPATIENT)
Dept: HOME HEALTH SERVICES | Facility: HOSPITAL | Age: 77
End: 2022-01-01
Payer: MEDICARE

## 2022-01-01 ENCOUNTER — NURSE TRIAGE (OUTPATIENT)
Dept: ADMINISTRATIVE | Facility: CLINIC | Age: 77
End: 2022-01-01
Payer: MEDICARE

## 2022-01-01 ENCOUNTER — HOSPITAL ENCOUNTER (OUTPATIENT)
Facility: HOSPITAL | Age: 77
Discharge: HOME | End: 2022-05-01
Attending: EMERGENCY MEDICINE | Admitting: HOSPITALIST
Payer: MEDICARE

## 2022-01-01 ENCOUNTER — HOSPITAL ENCOUNTER (OUTPATIENT)
Facility: HOSPITAL | Age: 77
Discharge: HOME | End: 2022-04-11
Attending: SURGERY | Admitting: SURGERY
Payer: MEDICARE

## 2022-01-01 VITALS
HEART RATE: 108 BPM | SYSTOLIC BLOOD PRESSURE: 110 MMHG | WEIGHT: 125 LBS | BODY MASS INDEX: 20.83 KG/M2 | DIASTOLIC BLOOD PRESSURE: 58 MMHG | TEMPERATURE: 98 F | OXYGEN SATURATION: 96 % | HEIGHT: 65 IN

## 2022-01-01 VITALS
OXYGEN SATURATION: 100 % | RESPIRATION RATE: 18 BRPM | DIASTOLIC BLOOD PRESSURE: 75 MMHG | TEMPERATURE: 98 F | HEIGHT: 65 IN | SYSTOLIC BLOOD PRESSURE: 106 MMHG | WEIGHT: 127.31 LBS | BODY MASS INDEX: 21.21 KG/M2 | HEART RATE: 74 BPM

## 2022-01-01 VITALS
BODY MASS INDEX: 21.45 KG/M2 | SYSTOLIC BLOOD PRESSURE: 100 MMHG | BODY MASS INDEX: 22.16 KG/M2 | DIASTOLIC BLOOD PRESSURE: 68 MMHG | HEART RATE: 54 BPM | WEIGHT: 128.75 LBS | HEIGHT: 65 IN | SYSTOLIC BLOOD PRESSURE: 123 MMHG | OXYGEN SATURATION: 95 % | RESPIRATION RATE: 18 BRPM | OXYGEN SATURATION: 97 % | HEIGHT: 65 IN | WEIGHT: 133 LBS | TEMPERATURE: 98 F | RESPIRATION RATE: 16 BRPM | HEART RATE: 80 BPM | DIASTOLIC BLOOD PRESSURE: 49 MMHG | TEMPERATURE: 97 F

## 2022-01-01 VITALS
WEIGHT: 133 LBS | HEART RATE: 116 BPM | RESPIRATION RATE: 28 BRPM | TEMPERATURE: 98 F | OXYGEN SATURATION: 96 % | BODY MASS INDEX: 22.16 KG/M2 | HEIGHT: 65 IN | DIASTOLIC BLOOD PRESSURE: 83 MMHG | SYSTOLIC BLOOD PRESSURE: 123 MMHG

## 2022-01-01 VITALS
OXYGEN SATURATION: 98 % | WEIGHT: 128 LBS | DIASTOLIC BLOOD PRESSURE: 64 MMHG | HEART RATE: 89 BPM | HEIGHT: 65 IN | HEART RATE: 74 BPM | DIASTOLIC BLOOD PRESSURE: 70 MMHG | BODY MASS INDEX: 21.33 KG/M2 | SYSTOLIC BLOOD PRESSURE: 122 MMHG | HEIGHT: 65 IN | OXYGEN SATURATION: 100 % | BODY MASS INDEX: 21.56 KG/M2 | SYSTOLIC BLOOD PRESSURE: 132 MMHG | WEIGHT: 129.44 LBS

## 2022-01-01 VITALS
TEMPERATURE: 97 F | SYSTOLIC BLOOD PRESSURE: 144 MMHG | HEART RATE: 106 BPM | DIASTOLIC BLOOD PRESSURE: 66 MMHG | BODY MASS INDEX: 21.79 KG/M2 | OXYGEN SATURATION: 96 % | RESPIRATION RATE: 16 BRPM | HEIGHT: 65 IN | WEIGHT: 130.75 LBS

## 2022-01-01 VITALS
SYSTOLIC BLOOD PRESSURE: 110 MMHG | HEART RATE: 88 BPM | BODY MASS INDEX: 20.79 KG/M2 | TEMPERATURE: 98 F | DIASTOLIC BLOOD PRESSURE: 82 MMHG | OXYGEN SATURATION: 97 % | RESPIRATION RATE: 16 BRPM | WEIGHT: 124.81 LBS | HEIGHT: 65 IN

## 2022-01-01 VITALS — WEIGHT: 133 LBS | BODY MASS INDEX: 22.16 KG/M2 | HEIGHT: 65 IN

## 2022-01-01 VITALS
SYSTOLIC BLOOD PRESSURE: 159 MMHG | DIASTOLIC BLOOD PRESSURE: 97 MMHG | RESPIRATION RATE: 18 BRPM | OXYGEN SATURATION: 100 % | HEART RATE: 90 BPM

## 2022-01-01 DIAGNOSIS — Z95.2 H/O MITRAL VALVE REPLACEMENT: ICD-10-CM

## 2022-01-01 DIAGNOSIS — Z79.01 LONG TERM (CURRENT) USE OF ANTICOAGULANTS: ICD-10-CM

## 2022-01-01 DIAGNOSIS — I48.91 ATRIAL FIBRILLATION WITH RVR: Primary | ICD-10-CM

## 2022-01-01 DIAGNOSIS — E78.00 PURE HYPERCHOLESTEROLEMIA: Chronic | ICD-10-CM

## 2022-01-01 DIAGNOSIS — R06.03 RESPIRATORY DISTRESS: ICD-10-CM

## 2022-01-01 DIAGNOSIS — I48.11 LONGSTANDING PERSISTENT ATRIAL FIBRILLATION: Primary | ICD-10-CM

## 2022-01-01 DIAGNOSIS — M79.604 PAIN IN BOTH LOWER EXTREMITIES: Primary | ICD-10-CM

## 2022-01-01 DIAGNOSIS — R10.9 ABDOMINAL PAIN: ICD-10-CM

## 2022-01-01 DIAGNOSIS — R45.89 ANXIETY ABOUT HEALTH: ICD-10-CM

## 2022-01-01 DIAGNOSIS — R53.81 PHYSICAL DEBILITY: ICD-10-CM

## 2022-01-01 DIAGNOSIS — I10 ESSENTIAL HYPERTENSION: ICD-10-CM

## 2022-01-01 DIAGNOSIS — N18.6 ESRD (END STAGE RENAL DISEASE) ON DIALYSIS: ICD-10-CM

## 2022-01-01 DIAGNOSIS — K57.92 DIVERTICULITIS: Primary | ICD-10-CM

## 2022-01-01 DIAGNOSIS — J44.9 CHRONIC OBSTRUCTIVE PULMONARY DISEASE, UNSPECIFIED COPD TYPE: ICD-10-CM

## 2022-01-01 DIAGNOSIS — R07.9 CHEST PAIN: ICD-10-CM

## 2022-01-01 DIAGNOSIS — Z95.2 H/O MITRAL VALVE REPLACEMENT: Primary | ICD-10-CM

## 2022-01-01 DIAGNOSIS — I46.9 CARDIAC ARREST: Primary | ICD-10-CM

## 2022-01-01 DIAGNOSIS — A04.72 CLOSTRIDIUM DIFFICILE COLITIS: ICD-10-CM

## 2022-01-01 DIAGNOSIS — I50.43 ACUTE ON CHRONIC COMBINED SYSTOLIC AND DIASTOLIC HEART FAILURE: ICD-10-CM

## 2022-01-01 DIAGNOSIS — I48.21 PERMANENT ATRIAL FIBRILLATION: Chronic | ICD-10-CM

## 2022-01-01 DIAGNOSIS — I50.31 ACUTE DIASTOLIC CONGESTIVE HEART FAILURE: ICD-10-CM

## 2022-01-01 DIAGNOSIS — I48.11 LONGSTANDING PERSISTENT ATRIAL FIBRILLATION: ICD-10-CM

## 2022-01-01 DIAGNOSIS — R10.13 EPIGASTRIC PAIN: ICD-10-CM

## 2022-01-01 DIAGNOSIS — A04.72 COLITIS, CLOSTRIDIUM DIFFICILE: Primary | ICD-10-CM

## 2022-01-01 DIAGNOSIS — Z95.2 H/O MITRAL VALVE REPLACEMENT: Primary | Chronic | ICD-10-CM

## 2022-01-01 DIAGNOSIS — R06.02 SOB (SHORTNESS OF BREATH): ICD-10-CM

## 2022-01-01 DIAGNOSIS — I50.20 SYSTOLIC CONGESTIVE HEART FAILURE, UNSPECIFIED HF CHRONICITY: ICD-10-CM

## 2022-01-01 DIAGNOSIS — J81.0 ACUTE PULMONARY EDEMA: ICD-10-CM

## 2022-01-01 DIAGNOSIS — J96.11 CHRONIC HYPOXEMIC RESPIRATORY FAILURE: Chronic | ICD-10-CM

## 2022-01-01 DIAGNOSIS — K21.9 GASTROESOPHAGEAL REFLUX DISEASE WITHOUT ESOPHAGITIS: ICD-10-CM

## 2022-01-01 DIAGNOSIS — Z99.2 END STAGE RENAL DISEASE ON DIALYSIS: ICD-10-CM

## 2022-01-01 DIAGNOSIS — I48.20 CHRONIC ATRIAL FIBRILLATION: Chronic | ICD-10-CM

## 2022-01-01 DIAGNOSIS — J81.0 ACUTE PULMONARY EDEMA: Primary | ICD-10-CM

## 2022-01-01 DIAGNOSIS — N18.6 END STAGE RENAL DISEASE: ICD-10-CM

## 2022-01-01 DIAGNOSIS — I50.43 ACUTE ON CHRONIC HEART FAILURE WITH REDUCED EJECTION FRACTION AND DIASTOLIC DYSFUNCTION: ICD-10-CM

## 2022-01-01 DIAGNOSIS — I48.91 ATRIAL FIBRILLATION WITH RAPID VENTRICULAR RESPONSE: ICD-10-CM

## 2022-01-01 DIAGNOSIS — T82.590A MALFUNCTION OF ARTERIOVENOUS DIALYSIS FISTULA, INITIAL ENCOUNTER: Primary | ICD-10-CM

## 2022-01-01 DIAGNOSIS — R00.0 TACHYCARDIA: ICD-10-CM

## 2022-01-01 DIAGNOSIS — N18.6 ESRD (END STAGE RENAL DISEASE): Chronic | ICD-10-CM

## 2022-01-01 DIAGNOSIS — A04.72 C. DIFFICILE COLITIS: ICD-10-CM

## 2022-01-01 DIAGNOSIS — R06.02 SHORTNESS OF BREATH: ICD-10-CM

## 2022-01-01 DIAGNOSIS — Z01.818 PRE-OP EXAM: ICD-10-CM

## 2022-01-01 DIAGNOSIS — N18.6 END STAGE RENAL DISEASE ON DIALYSIS: ICD-10-CM

## 2022-01-01 DIAGNOSIS — I38 ACUTE ON CHRONIC COMBINED SYSTOLIC AND DIASTOLIC HEART FAILURE DUE TO VALVULAR DISEASE: ICD-10-CM

## 2022-01-01 DIAGNOSIS — I48.0 PAROXYSMAL ATRIAL FIBRILLATION: Chronic | ICD-10-CM

## 2022-01-01 DIAGNOSIS — I10 PRIMARY HYPERTENSION: Chronic | ICD-10-CM

## 2022-01-01 DIAGNOSIS — D64.9 ANEMIA, UNSPECIFIED TYPE: Chronic | ICD-10-CM

## 2022-01-01 DIAGNOSIS — M79.605 PAIN IN BOTH LOWER EXTREMITIES: Primary | ICD-10-CM

## 2022-01-01 DIAGNOSIS — K85.90 ACUTE PANCREATITIS: ICD-10-CM

## 2022-01-01 DIAGNOSIS — I48.0 PAROXYSMAL ATRIAL FIBRILLATION WITH RVR: ICD-10-CM

## 2022-01-01 DIAGNOSIS — R52 PAIN: ICD-10-CM

## 2022-01-01 DIAGNOSIS — R19.7 DIARRHEA, UNSPECIFIED TYPE: ICD-10-CM

## 2022-01-01 DIAGNOSIS — Z51.81 SUBTHERAPEUTIC ANTICOAGULATION: ICD-10-CM

## 2022-01-01 DIAGNOSIS — I50.22 CHRONIC SYSTOLIC HEART FAILURE: Chronic | ICD-10-CM

## 2022-01-01 DIAGNOSIS — Z99.2 ESRD (END STAGE RENAL DISEASE) ON DIALYSIS: ICD-10-CM

## 2022-01-01 DIAGNOSIS — I50.9 CONGESTIVE HEART FAILURE, UNSPECIFIED HF CHRONICITY, UNSPECIFIED HEART FAILURE TYPE: Primary | ICD-10-CM

## 2022-01-01 DIAGNOSIS — M26.622 ARTHRALGIA OF LEFT TEMPOROMANDIBULAR JOINT: ICD-10-CM

## 2022-01-01 DIAGNOSIS — Z95.2 H/O MITRAL VALVE REPLACEMENT: Chronic | ICD-10-CM

## 2022-01-01 DIAGNOSIS — Z79.01 WARFARIN ANTICOAGULATION: ICD-10-CM

## 2022-01-01 DIAGNOSIS — Z79.01 SUBTHERAPEUTIC ANTICOAGULATION: ICD-10-CM

## 2022-01-01 DIAGNOSIS — I50.43 ACUTE ON CHRONIC COMBINED SYSTOLIC AND DIASTOLIC HEART FAILURE DUE TO VALVULAR DISEASE: ICD-10-CM

## 2022-01-01 DIAGNOSIS — J96.11 CHRONIC HYPOXEMIC RESPIRATORY FAILURE: ICD-10-CM

## 2022-01-01 DIAGNOSIS — K52.9 COLITIS: ICD-10-CM

## 2022-01-01 DIAGNOSIS — R21 RASH: ICD-10-CM

## 2022-01-01 LAB
ABO + RH BLD: NORMAL
ALBUMIN SERPL BCP-MCNC: 3.2 G/DL (ref 3.5–5.2)
ALBUMIN SERPL BCP-MCNC: 3.3 G/DL (ref 3.5–5.2)
ALBUMIN SERPL BCP-MCNC: 3.4 G/DL (ref 3.5–5.2)
ALBUMIN SERPL BCP-MCNC: 3.5 G/DL (ref 3.5–5.2)
ALBUMIN SERPL BCP-MCNC: 3.6 G/DL (ref 3.5–5.2)
ALBUMIN SERPL BCP-MCNC: 3.7 G/DL (ref 3.5–5.2)
ALBUMIN SERPL BCP-MCNC: 3.7 G/DL (ref 3.5–5.2)
ALLENS TEST: ABNORMAL
ALP SERPL-CCNC: 103 U/L (ref 55–135)
ALP SERPL-CCNC: 103 U/L (ref 55–135)
ALP SERPL-CCNC: 107 U/L (ref 55–135)
ALP SERPL-CCNC: 117 U/L (ref 55–135)
ALP SERPL-CCNC: 62 U/L (ref 55–135)
ALP SERPL-CCNC: 77 U/L (ref 55–135)
ALP SERPL-CCNC: 78 U/L (ref 55–135)
ALP SERPL-CCNC: 86 U/L (ref 55–135)
ALP SERPL-CCNC: 86 U/L (ref 55–135)
ALP SERPL-CCNC: 97 U/L (ref 55–135)
ALT SERPL W/O P-5'-P-CCNC: 11 U/L (ref 10–44)
ALT SERPL W/O P-5'-P-CCNC: 19 U/L (ref 10–44)
ALT SERPL W/O P-5'-P-CCNC: 22 U/L (ref 10–44)
ALT SERPL W/O P-5'-P-CCNC: 24 U/L (ref 10–44)
ALT SERPL W/O P-5'-P-CCNC: 26 U/L (ref 10–44)
ALT SERPL W/O P-5'-P-CCNC: 27 U/L (ref 10–44)
ALT SERPL W/O P-5'-P-CCNC: 28 U/L (ref 10–44)
ALT SERPL W/O P-5'-P-CCNC: 30 U/L (ref 10–44)
ALT SERPL W/O P-5'-P-CCNC: 8 U/L (ref 10–44)
ALT SERPL W/O P-5'-P-CCNC: 9 U/L (ref 10–44)
ANION GAP SERPL CALC-SCNC: 12 MMOL/L (ref 8–16)
ANION GAP SERPL CALC-SCNC: 13 MMOL/L (ref 8–16)
ANION GAP SERPL CALC-SCNC: 13 MMOL/L (ref 8–16)
ANION GAP SERPL CALC-SCNC: 14 MMOL/L (ref 8–16)
ANION GAP SERPL CALC-SCNC: 15 MMOL/L (ref 8–16)
ANION GAP SERPL CALC-SCNC: 15 MMOL/L (ref 8–16)
ANION GAP SERPL CALC-SCNC: 16 MMOL/L (ref 8–16)
ANION GAP SERPL CALC-SCNC: 17 MMOL/L (ref 8–16)
ANION GAP SERPL CALC-SCNC: 18 MMOL/L (ref 8–16)
ANION GAP SERPL CALC-SCNC: 20 MMOL/L (ref 8–16)
ANION GAP SERPL CALC-SCNC: 20 MMOL/L (ref 8–16)
ANION GAP SERPL CALC-SCNC: 22 MMOL/L (ref 8–16)
APTT PPP: 34.3 SEC (ref 23.3–35.1)
APTT PPP: 36.5 SEC (ref 23.3–35.1)
APTT PPP: 37.6 SEC (ref 23.3–35.1)
AST SERPL-CCNC: 15 U/L (ref 10–40)
AST SERPL-CCNC: 15 U/L (ref 10–40)
AST SERPL-CCNC: 17 U/L (ref 10–40)
AST SERPL-CCNC: 31 U/L (ref 10–40)
AST SERPL-CCNC: 32 U/L (ref 10–40)
AST SERPL-CCNC: 36 U/L (ref 10–40)
AST SERPL-CCNC: 44 U/L (ref 10–40)
AST SERPL-CCNC: 49 U/L (ref 10–40)
AST SERPL-CCNC: 55 U/L (ref 10–40)
AST SERPL-CCNC: 64 U/L (ref 10–40)
AV INDEX (PROSTH): 0.53
AV MEAN GRADIENT: 5 MMHG
AV VALVE AREA: 1.79 CM2
BACTERIA BLD CULT: ABNORMAL
BACTERIA BLD CULT: NORMAL
BASOPHILS # BLD AUTO: 0 K/UL (ref 0–0.2)
BASOPHILS # BLD AUTO: 0.01 K/UL (ref 0–0.2)
BASOPHILS # BLD AUTO: 0.02 K/UL (ref 0–0.2)
BASOPHILS # BLD AUTO: 0.03 K/UL (ref 0–0.2)
BASOPHILS # BLD AUTO: 0.04 K/UL (ref 0–0.2)
BASOPHILS # BLD AUTO: 0.04 K/UL (ref 0–0.2)
BASOPHILS NFR BLD: 0 % (ref 0–1.9)
BASOPHILS NFR BLD: 0.1 % (ref 0–1.9)
BASOPHILS NFR BLD: 0.1 % (ref 0–1.9)
BASOPHILS NFR BLD: 0.2 % (ref 0–1.9)
BASOPHILS NFR BLD: 0.2 % (ref 0–1.9)
BASOPHILS NFR BLD: 0.4 % (ref 0–1.9)
BASOPHILS NFR BLD: 0.5 % (ref 0–1.9)
BASOPHILS NFR BLD: 0.6 % (ref 0–1.9)
BASOPHILS NFR BLD: 0.7 % (ref 0–1.9)
BASOPHILS NFR BLD: 1 % (ref 0–1.9)
BILIRUB SERPL-MCNC: 0.6 MG/DL (ref 0.1–1)
BILIRUB SERPL-MCNC: 0.6 MG/DL (ref 0.1–1)
BILIRUB SERPL-MCNC: 0.7 MG/DL (ref 0.1–1)
BILIRUB SERPL-MCNC: 0.8 MG/DL (ref 0.1–1)
BILIRUB SERPL-MCNC: 1.3 MG/DL (ref 0.1–1)
BILIRUB SERPL-MCNC: 1.5 MG/DL (ref 0.1–1)
BILIRUB SERPL-MCNC: 1.5 MG/DL (ref 0.1–1)
BILIRUB SERPL-MCNC: 1.6 MG/DL (ref 0.1–1)
BILIRUB SERPL-MCNC: 1.6 MG/DL (ref 0.1–1)
BILIRUB SERPL-MCNC: 1.7 MG/DL (ref 0.1–1)
BLD GP AB SCN CELLS X3 SERPL QL: NORMAL
BNP SERPL-MCNC: >4500 PG/ML (ref 0–99)
BSA FOR ECHO PROCEDURE: 1.66 M2
BUN SERPL-MCNC: 21 MG/DL (ref 8–23)
BUN SERPL-MCNC: 21 MG/DL (ref 8–23)
BUN SERPL-MCNC: 24 MG/DL (ref 8–23)
BUN SERPL-MCNC: 26 MG/DL (ref 8–23)
BUN SERPL-MCNC: 26 MG/DL (ref 8–23)
BUN SERPL-MCNC: 27 MG/DL (ref 8–23)
BUN SERPL-MCNC: 28 MG/DL (ref 8–23)
BUN SERPL-MCNC: 28 MG/DL (ref 8–23)
BUN SERPL-MCNC: 30 MG/DL (ref 8–23)
BUN SERPL-MCNC: 34 MG/DL (ref 8–23)
BUN SERPL-MCNC: 37 MG/DL (ref 8–23)
BUN SERPL-MCNC: 37 MG/DL (ref 8–23)
BUN SERPL-MCNC: 38 MG/DL (ref 8–23)
BUN SERPL-MCNC: 38 MG/DL (ref 8–23)
BUN SERPL-MCNC: 39 MG/DL (ref 8–23)
BUN SERPL-MCNC: 44 MG/DL (ref 8–23)
BUN SERPL-MCNC: 49 MG/DL (ref 8–23)
BUN SERPL-MCNC: 51 MG/DL (ref 8–23)
C DIFF GDH STL QL: NEGATIVE
C DIFF GDH STL QL: POSITIVE
C DIFF TOX A+B STL QL IA: NEGATIVE
C DIFF TOX A+B STL QL IA: NEGATIVE
C DIFF TOX GENS STL QL NAA+PROBE: POSITIVE
CALCIUM SERPL-MCNC: 7.7 MG/DL (ref 8.7–10.5)
CALCIUM SERPL-MCNC: 7.9 MG/DL (ref 8.7–10.5)
CALCIUM SERPL-MCNC: 7.9 MG/DL (ref 8.7–10.5)
CALCIUM SERPL-MCNC: 8 MG/DL (ref 8.7–10.5)
CALCIUM SERPL-MCNC: 8.1 MG/DL (ref 8.7–10.5)
CALCIUM SERPL-MCNC: 8.2 MG/DL (ref 8.7–10.5)
CALCIUM SERPL-MCNC: 8.4 MG/DL (ref 8.7–10.5)
CALCIUM SERPL-MCNC: 8.5 MG/DL (ref 8.7–10.5)
CALCIUM SERPL-MCNC: 8.6 MG/DL (ref 8.7–10.5)
CALCIUM SERPL-MCNC: 8.8 MG/DL (ref 8.7–10.5)
CALCIUM SERPL-MCNC: 8.8 MG/DL (ref 8.7–10.5)
CALCIUM SERPL-MCNC: 9 MG/DL (ref 8.7–10.5)
CHLORIDE SERPL-SCNC: 86 MMOL/L (ref 95–110)
CHLORIDE SERPL-SCNC: 87 MMOL/L (ref 95–110)
CHLORIDE SERPL-SCNC: 90 MMOL/L (ref 95–110)
CHLORIDE SERPL-SCNC: 91 MMOL/L (ref 95–110)
CHLORIDE SERPL-SCNC: 93 MMOL/L (ref 95–110)
CHLORIDE SERPL-SCNC: 94 MMOL/L (ref 95–110)
CHLORIDE SERPL-SCNC: 95 MMOL/L (ref 95–110)
CHLORIDE SERPL-SCNC: 96 MMOL/L (ref 95–110)
CHLORIDE SERPL-SCNC: 97 MMOL/L (ref 95–110)
CHLORIDE SERPL-SCNC: 98 MMOL/L (ref 95–110)
CK SERPL-CCNC: 50 U/L (ref 20–180)
CO2 SERPL-SCNC: 22 MMOL/L (ref 23–29)
CO2 SERPL-SCNC: 24 MMOL/L (ref 23–29)
CO2 SERPL-SCNC: 25 MMOL/L (ref 23–29)
CO2 SERPL-SCNC: 26 MMOL/L (ref 23–29)
CO2 SERPL-SCNC: 27 MMOL/L (ref 23–29)
CO2 SERPL-SCNC: 31 MMOL/L (ref 23–29)
CREAT SERPL-MCNC: 4.2 MG/DL (ref 0.5–1.4)
CREAT SERPL-MCNC: 4.2 MG/DL (ref 0.5–1.4)
CREAT SERPL-MCNC: 4.4 MG/DL (ref 0.5–1.4)
CREAT SERPL-MCNC: 4.8 MG/DL (ref 0.5–1.4)
CREAT SERPL-MCNC: 4.9 MG/DL (ref 0.5–1.4)
CREAT SERPL-MCNC: 5.1 MG/DL (ref 0.5–1.4)
CREAT SERPL-MCNC: 5.2 MG/DL (ref 0.5–1.4)
CREAT SERPL-MCNC: 5.4 MG/DL (ref 0.5–1.4)
CREAT SERPL-MCNC: 5.5 MG/DL (ref 0.5–1.4)
CREAT SERPL-MCNC: 5.7 MG/DL (ref 0.5–1.4)
CREAT SERPL-MCNC: 5.8 MG/DL (ref 0.5–1.4)
CREAT SERPL-MCNC: 5.9 MG/DL (ref 0.5–1.4)
CREAT SERPL-MCNC: 5.9 MG/DL (ref 0.5–1.4)
CREAT SERPL-MCNC: 6 MG/DL (ref 0.5–1.4)
CREAT SERPL-MCNC: 6.8 MG/DL (ref 0.5–1.4)
CREAT SERPL-MCNC: 7 MG/DL (ref 0.5–1.4)
CREAT SERPL-MCNC: 7.3 MG/DL (ref 0.5–1.4)
CREAT SERPL-MCNC: 7.4 MG/DL (ref 0.5–1.4)
CRP SERPL-MCNC: 1.35 MG/DL
CRP SERPL-MCNC: 1.54 MG/DL
CV ECHO LV RWT: 0.98 CM
DELSYS: ABNORMAL
DIFFERENTIAL METHOD: ABNORMAL
DOP CALC AO VTI: 21.83 CM
DOP CALC LVOT AREA: 3.4 CM2
DOP CALC LVOT DIAMETER: 2.08 CM
DOP CALC LVOT PEAK VEL: 75.78 M/S
DOP CALC LVOT STROKE VOLUME: 38.99 CM3
DOP CALCLVOT PEAK VEL VTI: 11.48 CM
ECHO LV POSTERIOR WALL: 1.95 CM (ref 0.6–1.1)
EJECTION FRACTION: 50 %
EOSINOPHIL # BLD AUTO: 0 K/UL (ref 0–0.5)
EOSINOPHIL # BLD AUTO: 0.1 K/UL (ref 0–0.5)
EOSINOPHIL NFR BLD: 0 % (ref 0–8)
EOSINOPHIL NFR BLD: 0.3 % (ref 0–8)
EOSINOPHIL NFR BLD: 0.3 % (ref 0–8)
EOSINOPHIL NFR BLD: 0.6 % (ref 0–8)
EOSINOPHIL NFR BLD: 0.6 % (ref 0–8)
EOSINOPHIL NFR BLD: 0.8 % (ref 0–8)
EOSINOPHIL NFR BLD: 0.9 % (ref 0–8)
EOSINOPHIL NFR BLD: 1 % (ref 0–8)
EOSINOPHIL NFR BLD: 1.1 % (ref 0–8)
EOSINOPHIL NFR BLD: 1.2 % (ref 0–8)
EOSINOPHIL NFR BLD: 1.4 % (ref 0–8)
EOSINOPHIL NFR BLD: 1.7 % (ref 0–8)
EOSINOPHIL NFR BLD: 1.8 % (ref 0–8)
EOSINOPHIL NFR BLD: 2.2 % (ref 0–8)
EOSINOPHIL NFR BLD: 2.4 % (ref 0–8)
ERYTHROCYTE [DISTWIDTH] IN BLOOD BY AUTOMATED COUNT: 15.4 % (ref 11.5–14.5)
ERYTHROCYTE [DISTWIDTH] IN BLOOD BY AUTOMATED COUNT: 15.4 % (ref 11.5–14.5)
ERYTHROCYTE [DISTWIDTH] IN BLOOD BY AUTOMATED COUNT: 15.6 % (ref 11.5–14.5)
ERYTHROCYTE [DISTWIDTH] IN BLOOD BY AUTOMATED COUNT: 15.7 % (ref 11.5–14.5)
ERYTHROCYTE [DISTWIDTH] IN BLOOD BY AUTOMATED COUNT: 16.3 % (ref 11.5–14.5)
ERYTHROCYTE [DISTWIDTH] IN BLOOD BY AUTOMATED COUNT: 16.4 % (ref 11.5–14.5)
ERYTHROCYTE [DISTWIDTH] IN BLOOD BY AUTOMATED COUNT: 16.4 % (ref 11.5–14.5)
ERYTHROCYTE [DISTWIDTH] IN BLOOD BY AUTOMATED COUNT: 16.5 % (ref 11.5–14.5)
ERYTHROCYTE [DISTWIDTH] IN BLOOD BY AUTOMATED COUNT: 16.5 % (ref 11.5–14.5)
ERYTHROCYTE [DISTWIDTH] IN BLOOD BY AUTOMATED COUNT: 16.8 % (ref 11.5–14.5)
ERYTHROCYTE [DISTWIDTH] IN BLOOD BY AUTOMATED COUNT: 17.2 % (ref 11.5–14.5)
ERYTHROCYTE [DISTWIDTH] IN BLOOD BY AUTOMATED COUNT: 17.3 % (ref 11.5–14.5)
ERYTHROCYTE [DISTWIDTH] IN BLOOD BY AUTOMATED COUNT: 17.3 % (ref 11.5–14.5)
ERYTHROCYTE [DISTWIDTH] IN BLOOD BY AUTOMATED COUNT: 17.4 % (ref 11.5–14.5)
ERYTHROCYTE [DISTWIDTH] IN BLOOD BY AUTOMATED COUNT: 18.5 % (ref 11.5–14.5)
ERYTHROCYTE [DISTWIDTH] IN BLOOD BY AUTOMATED COUNT: 18.6 % (ref 11.5–14.5)
ERYTHROCYTE [DISTWIDTH] IN BLOOD BY AUTOMATED COUNT: 18.7 % (ref 11.5–14.5)
ERYTHROCYTE [DISTWIDTH] IN BLOOD BY AUTOMATED COUNT: 18.8 % (ref 11.5–14.5)
ERYTHROCYTE [SEDIMENTATION RATE] IN BLOOD BY WESTERGREN METHOD: 3 MM/H
EST. GFR  (AFRICAN AMERICAN): 10.6 ML/MIN/1.73 M^2
EST. GFR  (AFRICAN AMERICAN): 11.2 ML/MIN/1.73 M^2
EST. GFR  (AFRICAN AMERICAN): 11.2 ML/MIN/1.73 M^2
EST. GFR  (AFRICAN AMERICAN): 5.7 ML/MIN/1.73 M^2
EST. GFR  (AFRICAN AMERICAN): 5.8 ML/MIN/1.73 M^2
EST. GFR  (AFRICAN AMERICAN): 6.1 ML/MIN/1.73 M^2
EST. GFR  (AFRICAN AMERICAN): 6.3 ML/MIN/1.73 M^2
EST. GFR  (AFRICAN AMERICAN): 7.3 ML/MIN/1.73 M^2
EST. GFR  (AFRICAN AMERICAN): 7.4 ML/MIN/1.73 M^2
EST. GFR  (AFRICAN AMERICAN): 7.4 ML/MIN/1.73 M^2
EST. GFR  (AFRICAN AMERICAN): 7.6 ML/MIN/1.73 M^2
EST. GFR  (AFRICAN AMERICAN): 7.8 ML/MIN/1.73 M^2
EST. GFR  (AFRICAN AMERICAN): 8.1 ML/MIN/1.73 M^2
EST. GFR  (AFRICAN AMERICAN): 8.3 ML/MIN/1.73 M^2
EST. GFR  (AFRICAN AMERICAN): 8.7 ML/MIN/1.73 M^2
EST. GFR  (AFRICAN AMERICAN): 8.9 ML/MIN/1.73 M^2
EST. GFR  (AFRICAN AMERICAN): 9.3 ML/MIN/1.73 M^2
EST. GFR  (AFRICAN AMERICAN): 9.6 ML/MIN/1.73 M^2
EST. GFR  (NON AFRICAN AMERICAN): 4.9 ML/MIN/1.73 M^2
EST. GFR  (NON AFRICAN AMERICAN): 5 ML/MIN/1.73 M^2
EST. GFR  (NON AFRICAN AMERICAN): 5.3 ML/MIN/1.73 M^2
EST. GFR  (NON AFRICAN AMERICAN): 5.4 ML/MIN/1.73 M^2
EST. GFR  (NON AFRICAN AMERICAN): 6.3 ML/MIN/1.73 M^2
EST. GFR  (NON AFRICAN AMERICAN): 6.5 ML/MIN/1.73 M^2
EST. GFR  (NON AFRICAN AMERICAN): 6.5 ML/MIN/1.73 M^2
EST. GFR  (NON AFRICAN AMERICAN): 6.6 ML/MIN/1.73 M^2
EST. GFR  (NON AFRICAN AMERICAN): 6.7 ML/MIN/1.73 M^2
EST. GFR  (NON AFRICAN AMERICAN): 7 ML/MIN/1.73 M^2
EST. GFR  (NON AFRICAN AMERICAN): 7.2 ML/MIN/1.73 M^2
EST. GFR  (NON AFRICAN AMERICAN): 7.5 ML/MIN/1.73 M^2
EST. GFR  (NON AFRICAN AMERICAN): 7.7 ML/MIN/1.73 M^2
EST. GFR  (NON AFRICAN AMERICAN): 8.1 ML/MIN/1.73 M^2
EST. GFR  (NON AFRICAN AMERICAN): 8.3 ML/MIN/1.73 M^2
EST. GFR  (NON AFRICAN AMERICAN): 9.2 ML/MIN/1.73 M^2
EST. GFR  (NON AFRICAN AMERICAN): 9.7 ML/MIN/1.73 M^2
EST. GFR  (NON AFRICAN AMERICAN): 9.7 ML/MIN/1.73 M^2
FERRITIN SERPL-MCNC: 1364 NG/ML (ref 20–300)
FERRITIN SERPL-MCNC: 906 NG/ML (ref 20–300)
FIO2: 32
FOLATE SERPL-MCNC: 6.1 NG/ML (ref 4–24)
FRACTIONAL SHORTENING: 11 % (ref 28–44)
GLUCOSE SERPL-MCNC: 102 MG/DL (ref 70–110)
GLUCOSE SERPL-MCNC: 107 MG/DL (ref 70–110)
GLUCOSE SERPL-MCNC: 109 MG/DL (ref 70–110)
GLUCOSE SERPL-MCNC: 110 MG/DL (ref 70–110)
GLUCOSE SERPL-MCNC: 110 MG/DL (ref 70–110)
GLUCOSE SERPL-MCNC: 117 MG/DL (ref 70–110)
GLUCOSE SERPL-MCNC: 118 MG/DL (ref 70–110)
GLUCOSE SERPL-MCNC: 134 MG/DL (ref 70–110)
GLUCOSE SERPL-MCNC: 142 MG/DL (ref 70–110)
GLUCOSE SERPL-MCNC: 142 MG/DL (ref 70–110)
GLUCOSE SERPL-MCNC: 157 MG/DL (ref 70–110)
GLUCOSE SERPL-MCNC: 265 MG/DL (ref 70–110)
GLUCOSE SERPL-MCNC: 46 MG/DL (ref 70–110)
GLUCOSE SERPL-MCNC: 49 MG/DL (ref 70–110)
GLUCOSE SERPL-MCNC: 52 MG/DL (ref 70–110)
GLUCOSE SERPL-MCNC: 53 MG/DL (ref 70–110)
GLUCOSE SERPL-MCNC: 57 MG/DL (ref 70–110)
GLUCOSE SERPL-MCNC: 57 MG/DL (ref 70–110)
GLUCOSE SERPL-MCNC: 58 MG/DL (ref 70–110)
GLUCOSE SERPL-MCNC: 60 MG/DL (ref 70–110)
GLUCOSE SERPL-MCNC: 63 MG/DL (ref 70–110)
GLUCOSE SERPL-MCNC: 66 MG/DL (ref 70–110)
GLUCOSE SERPL-MCNC: 68 MG/DL (ref 70–110)
GLUCOSE SERPL-MCNC: 70 MG/DL (ref 70–110)
GLUCOSE SERPL-MCNC: 72 MG/DL (ref 70–110)
GLUCOSE SERPL-MCNC: 72 MG/DL (ref 70–110)
GLUCOSE SERPL-MCNC: 73 MG/DL (ref 70–110)
GLUCOSE SERPL-MCNC: 75 MG/DL (ref 70–110)
GLUCOSE SERPL-MCNC: 75 MG/DL (ref 70–110)
GLUCOSE SERPL-MCNC: 78 MG/DL (ref 70–110)
GLUCOSE SERPL-MCNC: 78 MG/DL (ref 70–110)
GLUCOSE SERPL-MCNC: 81 MG/DL (ref 70–110)
GLUCOSE SERPL-MCNC: 81 MG/DL (ref 70–110)
GLUCOSE SERPL-MCNC: 83 MG/DL (ref 70–110)
GLUCOSE SERPL-MCNC: 84 MG/DL (ref 70–110)
GLUCOSE SERPL-MCNC: 84 MG/DL (ref 70–110)
GLUCOSE SERPL-MCNC: 85 MG/DL (ref 70–110)
GLUCOSE SERPL-MCNC: 90 MG/DL (ref 70–110)
GLUCOSE SERPL-MCNC: 94 MG/DL (ref 70–110)
GLUCOSE SERPL-MCNC: 96 MG/DL (ref 70–110)
HBV CORE AB SERPL QL IA: NEGATIVE
HBV SURFACE AB SER QL: NON REACTIVE
HBV SURFACE AG SERPL QL IA: NEGATIVE
HCO3 UR-SCNC: 21.9 MMOL/L (ref 24–28)
HCT VFR BLD AUTO: 31 % (ref 37–48.5)
HCT VFR BLD AUTO: 31.2 % (ref 37–48.5)
HCT VFR BLD AUTO: 33.9 % (ref 37–48.5)
HCT VFR BLD AUTO: 34.5 % (ref 37–48.5)
HCT VFR BLD AUTO: 34.6 % (ref 37–48.5)
HCT VFR BLD AUTO: 34.7 % (ref 37–48.5)
HCT VFR BLD AUTO: 34.9 % (ref 37–48.5)
HCT VFR BLD AUTO: 35.6 % (ref 37–48.5)
HCT VFR BLD AUTO: 35.7 % (ref 37–48.5)
HCT VFR BLD AUTO: 36.2 % (ref 37–48.5)
HCT VFR BLD AUTO: 36.4 % (ref 37–48.5)
HCT VFR BLD AUTO: 36.8 % (ref 37–48.5)
HCT VFR BLD AUTO: 38.2 % (ref 37–48.5)
HCT VFR BLD AUTO: 39.9 % (ref 37–48.5)
HCT VFR BLD AUTO: 41.5 % (ref 37–48.5)
HCT VFR BLD AUTO: 41.6 % (ref 37–48.5)
HCT VFR BLD AUTO: 41.7 % (ref 37–48.5)
HCT VFR BLD AUTO: 42 % (ref 37–48.5)
HGB BLD-MCNC: 10.4 G/DL (ref 12–16)
HGB BLD-MCNC: 10.5 G/DL (ref 12–16)
HGB BLD-MCNC: 10.6 G/DL (ref 12–16)
HGB BLD-MCNC: 11 G/DL (ref 12–16)
HGB BLD-MCNC: 11.1 G/DL (ref 12–16)
HGB BLD-MCNC: 11.1 G/DL (ref 12–16)
HGB BLD-MCNC: 11.2 G/DL (ref 12–16)
HGB BLD-MCNC: 11.3 G/DL (ref 12–16)
HGB BLD-MCNC: 11.3 G/DL (ref 12–16)
HGB BLD-MCNC: 11.5 G/DL (ref 12–16)
HGB BLD-MCNC: 12 G/DL (ref 12–16)
HGB BLD-MCNC: 12.5 G/DL (ref 12–16)
HGB BLD-MCNC: 13 G/DL (ref 12–16)
HGB BLD-MCNC: 13 G/DL (ref 12–16)
HGB BLD-MCNC: 13.1 G/DL (ref 12–16)
HGB BLD-MCNC: 13.1 G/DL (ref 12–16)
HGB BLD-MCNC: 9.7 G/DL (ref 12–16)
HGB BLD-MCNC: 9.8 G/DL (ref 12–16)
IMM GRANULOCYTES # BLD AUTO: 0.01 K/UL (ref 0–0.04)
IMM GRANULOCYTES # BLD AUTO: 0.02 K/UL (ref 0–0.04)
IMM GRANULOCYTES # BLD AUTO: 0.03 K/UL (ref 0–0.04)
IMM GRANULOCYTES # BLD AUTO: 0.05 K/UL (ref 0–0.04)
IMM GRANULOCYTES # BLD AUTO: 0.08 K/UL (ref 0–0.04)
IMM GRANULOCYTES NFR BLD AUTO: 0.2 % (ref 0–0.5)
IMM GRANULOCYTES NFR BLD AUTO: 0.3 % (ref 0–0.5)
IMM GRANULOCYTES NFR BLD AUTO: 0.4 % (ref 0–0.5)
IMM GRANULOCYTES NFR BLD AUTO: 0.6 % (ref 0–0.5)
IMM GRANULOCYTES NFR BLD AUTO: 0.7 % (ref 0–0.5)
IMM GRANULOCYTES NFR BLD AUTO: 0.9 % (ref 0–0.5)
IMM GRANULOCYTES NFR BLD AUTO: 1.6 % (ref 0–0.5)
INFLUENZA A, MOLECULAR: NEGATIVE
INFLUENZA B, MOLECULAR: NEGATIVE
INR PPP: 1.2
INR PPP: 1.3
INR PPP: 1.4
INR PPP: 1.4
INR PPP: 1.5
INR PPP: 1.5
INR PPP: 1.6
INR PPP: 1.8
INR PPP: 1.9
INR PPP: 1.9
INR PPP: 2
INR PPP: 2.2
INR PPP: 2.3
INR PPP: 2.4
INR PPP: 2.7
INR PPP: 3
INR PPP: 3.4
INR PPP: 3.7
INR PPP: 5.1
INR PPP: 7
INTERVENTRICULAR SEPTUM: 1.62 CM (ref 0.6–1.1)
IRON SERPL-MCNC: 59 UG/DL (ref 30–160)
LACTATE SERPL-SCNC: 1.6 MMOL/L (ref 0.5–1.9)
LACTATE SERPL-SCNC: 1.9 MMOL/L (ref 0.5–1.9)
LACTATE SERPL-SCNC: 2.2 MMOL/L (ref 0.5–1.9)
LACTATE SERPL-SCNC: 2.3 MMOL/L (ref 0.5–1.9)
LACTATE SERPL-SCNC: 2.4 MMOL/L (ref 0.5–1.9)
LACTATE SERPL-SCNC: 2.6 MMOL/L (ref 0.5–1.9)
LACTATE SERPL-SCNC: 2.8 MMOL/L (ref 0.5–1.9)
LDH SERPL L TO P-CCNC: 224 U/L (ref 110–260)
LEFT ATRIUM SIZE: 5.85 CM
LEFT INTERNAL DIMENSION IN SYSTOLE: 3.53 CM (ref 2.1–4)
LEFT VENTRICLE MASS INDEX: 184 G/M2
LEFT VENTRICULAR INTERNAL DIMENSION IN DIASTOLE: 3.98 CM (ref 3.5–6)
LEFT VENTRICULAR MASS: 306.21 G
LIPASE SERPL-CCNC: 150 U/L (ref 4–60)
LIPASE SERPL-CCNC: 167 U/L (ref 4–60)
LIPASE SERPL-CCNC: 40 U/L (ref 4–60)
LIPASE SERPL-CCNC: 83 U/L (ref 4–60)
LIPASE SERPL-CCNC: 84 U/L (ref 4–60)
LIPASE SERPL-CCNC: 86 U/L (ref 4–60)
LIPASE SERPL-CCNC: 92 U/L (ref 4–60)
LIPASE SERPL-CCNC: 93 U/L (ref 4–60)
LIPASE SERPL-CCNC: 96 U/L (ref 4–60)
LIPASE SERPL-CCNC: 96 U/L (ref 4–60)
LYMPHOCYTES # BLD AUTO: 0.4 K/UL (ref 1–4.8)
LYMPHOCYTES # BLD AUTO: 0.4 K/UL (ref 1–4.8)
LYMPHOCYTES # BLD AUTO: 0.5 K/UL (ref 1–4.8)
LYMPHOCYTES # BLD AUTO: 0.6 K/UL (ref 1–4.8)
LYMPHOCYTES # BLD AUTO: 0.7 K/UL (ref 1–4.8)
LYMPHOCYTES # BLD AUTO: 0.8 K/UL (ref 1–4.8)
LYMPHOCYTES # BLD AUTO: 0.9 K/UL (ref 1–4.8)
LYMPHOCYTES # BLD AUTO: 1 K/UL (ref 1–4.8)
LYMPHOCYTES # BLD AUTO: 1 K/UL (ref 1–4.8)
LYMPHOCYTES # BLD AUTO: 1.1 K/UL (ref 1–4.8)
LYMPHOCYTES NFR BLD: 12 % (ref 18–48)
LYMPHOCYTES NFR BLD: 12.2 % (ref 18–48)
LYMPHOCYTES NFR BLD: 13.8 % (ref 18–48)
LYMPHOCYTES NFR BLD: 15.6 % (ref 18–48)
LYMPHOCYTES NFR BLD: 15.8 % (ref 18–48)
LYMPHOCYTES NFR BLD: 15.8 % (ref 18–48)
LYMPHOCYTES NFR BLD: 16 % (ref 18–48)
LYMPHOCYTES NFR BLD: 16.6 % (ref 18–48)
LYMPHOCYTES NFR BLD: 16.8 % (ref 18–48)
LYMPHOCYTES NFR BLD: 16.9 % (ref 18–48)
LYMPHOCYTES NFR BLD: 19 % (ref 18–48)
LYMPHOCYTES NFR BLD: 19.4 % (ref 18–48)
LYMPHOCYTES NFR BLD: 19.8 % (ref 18–48)
LYMPHOCYTES NFR BLD: 19.9 % (ref 18–48)
LYMPHOCYTES NFR BLD: 20.6 % (ref 18–48)
LYMPHOCYTES NFR BLD: 6 % (ref 18–48)
LYMPHOCYTES NFR BLD: 7 % (ref 18–48)
MAGNESIUM SERPL-MCNC: 1.5 MG/DL (ref 1.6–2.6)
MAGNESIUM SERPL-MCNC: 1.6 MG/DL (ref 1.6–2.6)
MAGNESIUM SERPL-MCNC: 1.7 MG/DL (ref 1.6–2.6)
MAGNESIUM SERPL-MCNC: 1.7 MG/DL (ref 1.6–2.6)
MAGNESIUM SERPL-MCNC: 1.8 MG/DL (ref 1.6–2.6)
MAGNESIUM SERPL-MCNC: 1.8 MG/DL (ref 1.6–2.6)
MAGNESIUM SERPL-MCNC: 1.9 MG/DL (ref 1.6–2.6)
MAGNESIUM SERPL-MCNC: 1.9 MG/DL (ref 1.6–2.6)
MCH RBC QN AUTO: 28.5 PG (ref 27–31)
MCH RBC QN AUTO: 28.6 PG (ref 27–31)
MCH RBC QN AUTO: 28.7 PG (ref 27–31)
MCH RBC QN AUTO: 28.9 PG (ref 27–31)
MCH RBC QN AUTO: 28.9 PG (ref 27–31)
MCH RBC QN AUTO: 29 PG (ref 27–31)
MCH RBC QN AUTO: 29.3 PG (ref 27–31)
MCH RBC QN AUTO: 29.6 PG (ref 27–31)
MCH RBC QN AUTO: 29.7 PG (ref 27–31)
MCH RBC QN AUTO: 29.7 PG (ref 27–31)
MCHC RBC AUTO-ENTMCNC: 30.1 G/DL (ref 32–36)
MCHC RBC AUTO-ENTMCNC: 30.3 G/DL (ref 32–36)
MCHC RBC AUTO-ENTMCNC: 30.5 G/DL (ref 32–36)
MCHC RBC AUTO-ENTMCNC: 30.9 G/DL (ref 32–36)
MCHC RBC AUTO-ENTMCNC: 31 G/DL (ref 32–36)
MCHC RBC AUTO-ENTMCNC: 31 G/DL (ref 32–36)
MCHC RBC AUTO-ENTMCNC: 31.2 G/DL (ref 32–36)
MCHC RBC AUTO-ENTMCNC: 31.3 G/DL (ref 32–36)
MCHC RBC AUTO-ENTMCNC: 31.4 G/DL (ref 32–36)
MCHC RBC AUTO-ENTMCNC: 31.5 G/DL (ref 32–36)
MCHC RBC AUTO-ENTMCNC: 31.5 G/DL (ref 32–36)
MCHC RBC AUTO-ENTMCNC: 31.9 G/DL (ref 32–36)
MCHC RBC AUTO-ENTMCNC: 32.6 G/DL (ref 32–36)
MCV RBC AUTO: 90 FL (ref 82–98)
MCV RBC AUTO: 90 FL (ref 82–98)
MCV RBC AUTO: 91 FL (ref 82–98)
MCV RBC AUTO: 92 FL (ref 82–98)
MCV RBC AUTO: 92 FL (ref 82–98)
MCV RBC AUTO: 93 FL (ref 82–98)
MCV RBC AUTO: 94 FL (ref 82–98)
MCV RBC AUTO: 95 FL (ref 82–98)
MCV RBC AUTO: 96 FL (ref 82–98)
MODE: ABNORMAL
MONOCYTES # BLD AUTO: 0.2 K/UL (ref 0.3–1)
MONOCYTES # BLD AUTO: 0.3 K/UL (ref 0.3–1)
MONOCYTES # BLD AUTO: 0.4 K/UL (ref 0.3–1)
MONOCYTES # BLD AUTO: 0.5 K/UL (ref 0.3–1)
MONOCYTES # BLD AUTO: 0.6 K/UL (ref 0.3–1)
MONOCYTES # BLD AUTO: 0.7 K/UL (ref 0.3–1)
MONOCYTES NFR BLD: 10 % (ref 4–15)
MONOCYTES NFR BLD: 10.5 % (ref 4–15)
MONOCYTES NFR BLD: 10.5 % (ref 4–15)
MONOCYTES NFR BLD: 11.2 % (ref 4–15)
MONOCYTES NFR BLD: 11.5 % (ref 4–15)
MONOCYTES NFR BLD: 11.8 % (ref 4–15)
MONOCYTES NFR BLD: 13.6 % (ref 4–15)
MONOCYTES NFR BLD: 13.9 % (ref 4–15)
MONOCYTES NFR BLD: 14.6 % (ref 4–15)
MONOCYTES NFR BLD: 5.7 % (ref 4–15)
MONOCYTES NFR BLD: 6 % (ref 4–15)
MONOCYTES NFR BLD: 7.4 % (ref 4–15)
MONOCYTES NFR BLD: 7.4 % (ref 4–15)
MONOCYTES NFR BLD: 7.6 % (ref 4–15)
MONOCYTES NFR BLD: 7.6 % (ref 4–15)
MONOCYTES NFR BLD: 8.3 % (ref 4–15)
MONOCYTES NFR BLD: 9.6 % (ref 4–15)
NEUTROPHILS # BLD AUTO: 2.2 K/UL (ref 1.8–7.7)
NEUTROPHILS # BLD AUTO: 2.7 K/UL (ref 1.8–7.7)
NEUTROPHILS # BLD AUTO: 2.7 K/UL (ref 1.8–7.7)
NEUTROPHILS # BLD AUTO: 2.9 K/UL (ref 1.8–7.7)
NEUTROPHILS # BLD AUTO: 3 K/UL (ref 1.8–7.7)
NEUTROPHILS # BLD AUTO: 3.1 K/UL (ref 1.8–7.7)
NEUTROPHILS # BLD AUTO: 3.2 K/UL (ref 1.8–7.7)
NEUTROPHILS # BLD AUTO: 3.4 K/UL (ref 1.8–7.7)
NEUTROPHILS # BLD AUTO: 3.4 K/UL (ref 1.8–7.7)
NEUTROPHILS # BLD AUTO: 3.6 K/UL (ref 1.8–7.7)
NEUTROPHILS # BLD AUTO: 3.6 K/UL (ref 1.8–7.7)
NEUTROPHILS # BLD AUTO: 3.7 K/UL (ref 1.8–7.7)
NEUTROPHILS # BLD AUTO: 3.8 K/UL (ref 1.8–7.7)
NEUTROPHILS # BLD AUTO: 5.9 K/UL (ref 1.8–7.7)
NEUTROPHILS # BLD AUTO: 6.4 K/UL (ref 1.8–7.7)
NEUTROPHILS NFR BLD: 63.3 % (ref 38–73)
NEUTROPHILS NFR BLD: 66.1 % (ref 38–73)
NEUTROPHILS NFR BLD: 67.4 % (ref 38–73)
NEUTROPHILS NFR BLD: 68.4 % (ref 38–73)
NEUTROPHILS NFR BLD: 68.6 % (ref 38–73)
NEUTROPHILS NFR BLD: 68.7 % (ref 38–73)
NEUTROPHILS NFR BLD: 69.5 % (ref 38–73)
NEUTROPHILS NFR BLD: 69.6 % (ref 38–73)
NEUTROPHILS NFR BLD: 70 % (ref 38–73)
NEUTROPHILS NFR BLD: 72.7 % (ref 38–73)
NEUTROPHILS NFR BLD: 73.4 % (ref 38–73)
NEUTROPHILS NFR BLD: 74 % (ref 38–73)
NEUTROPHILS NFR BLD: 75.2 % (ref 38–73)
NEUTROPHILS NFR BLD: 75.2 % (ref 38–73)
NEUTROPHILS NFR BLD: 81.7 % (ref 38–73)
NEUTROPHILS NFR BLD: 84.6 % (ref 38–73)
NEUTROPHILS NFR BLD: 87.3 % (ref 38–73)
NRBC BLD-RTO: 0 /100 WBC
NRBC BLD-RTO: 1 /100 WBC
NRBC BLD-RTO: 2 /100 WBC
NRBC BLD-RTO: 5 /100 WBC
O+P SPEC MICRO: NORMAL
OB PNL STL: NEGATIVE
OB PNL STL: POSITIVE
PCO2 BLDA: 34 MMHG (ref 35–45)
PH SMN: 7.42 [PH] (ref 7.35–7.45)
PHOSPHATE SERPL-MCNC: 3.7 MG/DL (ref 2.7–4.5)
PHOSPHATE SERPL-MCNC: 4 MG/DL (ref 2.7–4.5)
PHOSPHATE SERPL-MCNC: 4 MG/DL (ref 2.7–4.5)
PHOSPHATE SERPL-MCNC: 4.3 MG/DL (ref 2.7–4.5)
PHOSPHATE SERPL-MCNC: 4.5 MG/DL (ref 2.7–4.5)
PHOSPHATE SERPL-MCNC: 5.1 MG/DL (ref 2.7–4.5)
PHOSPHATE SERPL-MCNC: 5.9 MG/DL (ref 2.7–4.5)
PHOSPHATE SERPL-MCNC: 6 MG/DL (ref 2.7–4.5)
PHOSPHATE SERPL-MCNC: 6.7 MG/DL (ref 2.7–4.5)
PISA TR MAX VEL: 2.85 M/S
PLATELET # BLD AUTO: 102 K/UL (ref 150–450)
PLATELET # BLD AUTO: 108 K/UL (ref 150–450)
PLATELET # BLD AUTO: 111 K/UL (ref 150–450)
PLATELET # BLD AUTO: 114 K/UL (ref 150–450)
PLATELET # BLD AUTO: 118 K/UL (ref 150–450)
PLATELET # BLD AUTO: 119 K/UL (ref 150–450)
PLATELET # BLD AUTO: 121 K/UL (ref 150–450)
PLATELET # BLD AUTO: 125 K/UL (ref 150–450)
PLATELET # BLD AUTO: 138 K/UL (ref 150–450)
PLATELET # BLD AUTO: 143 K/UL (ref 150–450)
PLATELET # BLD AUTO: 143 K/UL (ref 150–450)
PLATELET # BLD AUTO: 150 K/UL (ref 150–450)
PLATELET # BLD AUTO: 155 K/UL (ref 150–450)
PLATELET # BLD AUTO: 158 K/UL (ref 150–450)
PLATELET # BLD AUTO: 168 K/UL (ref 150–450)
PLATELET # BLD AUTO: 176 K/UL (ref 150–450)
PLATELET # BLD AUTO: 73 K/UL (ref 150–450)
PLATELET # BLD AUTO: 78 K/UL (ref 150–450)
PLATELET BLD QL SMEAR: ABNORMAL
PMV BLD AUTO: 10.9 FL (ref 9.2–12.9)
PMV BLD AUTO: 11 FL (ref 9.2–12.9)
PMV BLD AUTO: 11 FL (ref 9.2–12.9)
PMV BLD AUTO: 11.1 FL (ref 9.2–12.9)
PMV BLD AUTO: 11.2 FL (ref 9.2–12.9)
PMV BLD AUTO: 11.3 FL (ref 9.2–12.9)
PMV BLD AUTO: 11.5 FL (ref 9.2–12.9)
PMV BLD AUTO: 11.5 FL (ref 9.2–12.9)
PMV BLD AUTO: 11.7 FL (ref 9.2–12.9)
PMV BLD AUTO: 11.7 FL (ref 9.2–12.9)
PMV BLD AUTO: 11.9 FL (ref 9.2–12.9)
PMV BLD AUTO: 12 FL (ref 9.2–12.9)
PMV BLD AUTO: 12 FL (ref 9.2–12.9)
PMV BLD AUTO: 12.1 FL (ref 9.2–12.9)
PMV BLD AUTO: 12.1 FL (ref 9.2–12.9)
PMV BLD AUTO: 12.4 FL (ref 9.2–12.9)
PMV BLD AUTO: 12.5 FL (ref 9.2–12.9)
PMV BLD AUTO: 12.9 FL (ref 9.2–12.9)
PO2 BLDA: 96 MMHG (ref 80–100)
POC BE: -3 MMOL/L
POC SATURATED O2: 98 % (ref 95–100)
POC TCO2: 23 MMOL/L (ref 23–27)
POTASSIUM SERPL-SCNC: 3.6 MMOL/L (ref 3.5–5.1)
POTASSIUM SERPL-SCNC: 3.7 MMOL/L (ref 3.5–5.1)
POTASSIUM SERPL-SCNC: 3.8 MMOL/L (ref 3.5–5.1)
POTASSIUM SERPL-SCNC: 3.9 MMOL/L (ref 3.5–5.1)
POTASSIUM SERPL-SCNC: 3.9 MMOL/L (ref 3.5–5.1)
POTASSIUM SERPL-SCNC: 4 MMOL/L (ref 3.5–5.1)
POTASSIUM SERPL-SCNC: 4.1 MMOL/L (ref 3.5–5.1)
POTASSIUM SERPL-SCNC: 4.1 MMOL/L (ref 3.5–5.1)
POTASSIUM SERPL-SCNC: 4.2 MMOL/L (ref 3.5–5.1)
POTASSIUM SERPL-SCNC: 4.2 MMOL/L (ref 3.5–5.1)
POTASSIUM SERPL-SCNC: 4.3 MMOL/L (ref 3.5–5.1)
POTASSIUM SERPL-SCNC: 4.5 MMOL/L (ref 3.5–5.1)
POTASSIUM SERPL-SCNC: 4.7 MMOL/L (ref 3.5–5.1)
POTASSIUM SERPL-SCNC: 5 MMOL/L (ref 3.5–5.1)
PROCALCITONIN SERPL IA-MCNC: 0.18 NG/ML (ref 0–0.5)
PROT SERPL-MCNC: 7.3 G/DL (ref 6–8.4)
PROT SERPL-MCNC: 7.6 G/DL (ref 6–8.4)
PROT SERPL-MCNC: 7.6 G/DL (ref 6–8.4)
PROT SERPL-MCNC: 7.7 G/DL (ref 6–8.4)
PROT SERPL-MCNC: 7.7 G/DL (ref 6–8.4)
PROT SERPL-MCNC: 7.8 G/DL (ref 6–8.4)
PROT SERPL-MCNC: 8 G/DL (ref 6–8.4)
PROT SERPL-MCNC: 8 G/DL (ref 6–8.4)
PROT SERPL-MCNC: 8.1 G/DL (ref 6–8.4)
PROT SERPL-MCNC: 8.3 G/DL (ref 6–8.4)
PROT SERPL-MCNC: 8.6 G/DL (ref 6–8.4)
PROT SERPL-MCNC: 8.7 G/DL (ref 6–8.4)
PROTHROMBIN TIME: 15.3 SEC (ref 11.4–13.7)
PROTHROMBIN TIME: 16.4 SEC (ref 11.4–13.7)
PROTHROMBIN TIME: 17.6 SEC (ref 11.4–13.7)
PROTHROMBIN TIME: 17.7 SEC (ref 11.4–13.7)
PROTHROMBIN TIME: 18.4 SEC (ref 11.4–13.7)
PROTHROMBIN TIME: 20.1 SEC (ref 11.4–13.7)
PROTHROMBIN TIME: 20.7 SEC (ref 11.4–13.7)
PROTHROMBIN TIME: 20.7 SEC (ref 11.4–13.7)
PROTHROMBIN TIME: 23.1 SEC (ref 11.4–13.7)
PROTHROMBIN TIME: 23.9 SEC (ref 11.4–13.7)
PROTHROMBIN TIME: 26.9 SEC (ref 11.4–13.7)
PROTHROMBIN TIME: 32.3 SEC (ref 11.4–13.7)
PROTHROMBIN TIME: 44.9 SEC (ref 11.4–13.7)
RA PRESSURE: 15 MMHG
RBC # BLD AUTO: 3.3 M/UL (ref 4–5.4)
RBC # BLD AUTO: 3.62 M/UL (ref 4–5.4)
RBC # BLD AUTO: 3.67 M/UL (ref 4–5.4)
RBC # BLD AUTO: 3.7 M/UL (ref 4–5.4)
RBC # BLD AUTO: 3.76 M/UL (ref 4–5.4)
RBC # BLD AUTO: 3.78 M/UL (ref 4–5.4)
RBC # BLD AUTO: 3.86 M/UL (ref 4–5.4)
RBC # BLD AUTO: 3.88 M/UL (ref 4–5.4)
RBC # BLD AUTO: 3.88 M/UL (ref 4–5.4)
RBC # BLD AUTO: 3.89 M/UL (ref 4–5.4)
RBC # BLD AUTO: 3.91 M/UL (ref 4–5.4)
RBC # BLD AUTO: 3.91 M/UL (ref 4–5.4)
RBC # BLD AUTO: 4.1 M/UL (ref 4–5.4)
RBC # BLD AUTO: 4.37 M/UL (ref 4–5.4)
RBC # BLD AUTO: 4.43 M/UL (ref 4–5.4)
RBC # BLD AUTO: 4.43 M/UL (ref 4–5.4)
RBC # BLD AUTO: 4.48 M/UL (ref 4–5.4)
RBC # BLD AUTO: 4.57 M/UL (ref 4–5.4)
RIGHT VENTRICULAR END-DIASTOLIC DIMENSION: 402 CM
SAMPLE: ABNORMAL
SARS-COV-2 RDRP RESP QL NAA+PROBE: NEGATIVE
SATURATED IRON: 33 % (ref 20–50)
SITE: ABNORMAL
SODIUM SERPL-SCNC: 131 MMOL/L (ref 136–145)
SODIUM SERPL-SCNC: 132 MMOL/L (ref 136–145)
SODIUM SERPL-SCNC: 133 MMOL/L (ref 136–145)
SODIUM SERPL-SCNC: 134 MMOL/L (ref 136–145)
SODIUM SERPL-SCNC: 135 MMOL/L (ref 136–145)
SODIUM SERPL-SCNC: 136 MMOL/L (ref 136–145)
SODIUM SERPL-SCNC: 137 MMOL/L (ref 136–145)
SODIUM SERPL-SCNC: 137 MMOL/L (ref 136–145)
SODIUM SERPL-SCNC: 140 MMOL/L (ref 136–145)
SPECIMEN SOURCE: NORMAL
STOOL CULTURE: NORMAL
STOOL CULTURE: NORMAL
TDI LATERAL: 0.04 M/S
TDI SEPTAL: 0.03 M/S
TDI: 0.04 M/S
TOTAL IRON BINDING CAPACITY: 179 UG/DL (ref 250–450)
TR MAX PG: 32 MMHG
TRANSFERRIN SERPL-MCNC: 128 MG/DL (ref 200–375)
TRICUSPID ANNULAR PLANE SYSTOLIC EXCURSION: 142 CM
TROPONIN I SERPL DL<=0.01 NG/ML-MCNC: 0.03 NG/ML
TROPONIN I SERPL DL<=0.01 NG/ML-MCNC: 0.04 NG/ML
TROPONIN I SERPL DL<=0.01 NG/ML-MCNC: 0.06 NG/ML
TROPONIN I SERPL DL<=0.01 NG/ML-MCNC: 0.06 NG/ML
TROPONIN I SERPL DL<=0.01 NG/ML-MCNC: 0.07 NG/ML
TROPONIN I SERPL DL<=0.01 NG/ML-MCNC: 0.07 NG/ML
TROPONIN I SERPL DL<=0.01 NG/ML-MCNC: 0.08 NG/ML
TV REST PULMONARY ARTERY PRESSURE: 47 MMHG
VIT B12 SERPL-MCNC: 444 PG/ML (ref 210–950)
WBC # BLD AUTO: 3.14 K/UL (ref 3.9–12.7)
WBC # BLD AUTO: 3.5 K/UL (ref 3.9–12.7)
WBC # BLD AUTO: 3.53 K/UL (ref 3.9–12.7)
WBC # BLD AUTO: 3.78 K/UL (ref 3.9–12.7)
WBC # BLD AUTO: 3.82 K/UL (ref 3.9–12.7)
WBC # BLD AUTO: 4.21 K/UL (ref 3.9–12.7)
WBC # BLD AUTO: 4.37 K/UL (ref 3.9–12.7)
WBC # BLD AUTO: 4.57 K/UL (ref 3.9–12.7)
WBC # BLD AUTO: 4.62 K/UL (ref 3.9–12.7)
WBC # BLD AUTO: 4.7 K/UL (ref 3.9–12.7)
WBC # BLD AUTO: 4.76 K/UL (ref 3.9–12.7)
WBC # BLD AUTO: 4.76 K/UL (ref 3.9–12.7)
WBC # BLD AUTO: 4.95 K/UL (ref 3.9–12.7)
WBC # BLD AUTO: 5.01 K/UL (ref 3.9–12.7)
WBC # BLD AUTO: 5.28 K/UL (ref 3.9–12.7)
WBC # BLD AUTO: 5.4 K/UL (ref 3.9–12.7)
WBC # BLD AUTO: 6.7 K/UL (ref 3.9–12.7)
WBC # BLD AUTO: 7.6 K/UL (ref 3.9–12.7)
WBC #/AREA STL HPF: NORMAL /[HPF]

## 2022-01-01 PROCEDURE — 63600175 PHARM REV CODE 636 W HCPCS

## 2022-01-01 PROCEDURE — 84484 ASSAY OF TROPONIN QUANT: CPT | Performed by: NURSE PRACTITIONER

## 2022-01-01 PROCEDURE — 85018 HEMOGLOBIN: CPT | Mod: 91 | Performed by: INTERNAL MEDICINE

## 2022-01-01 PROCEDURE — 25000003 PHARM REV CODE 250: Performed by: FAMILY MEDICINE

## 2022-01-01 PROCEDURE — 99900031 HC PATIENT EDUCATION (STAT)

## 2022-01-01 PROCEDURE — 80053 COMPREHEN METABOLIC PANEL: CPT | Performed by: EMERGENCY MEDICINE

## 2022-01-01 PROCEDURE — 93793 ANTICOAG MGMT PT WARFARIN: CPT | Mod: S$GLB,,,

## 2022-01-01 PROCEDURE — 27000221 HC OXYGEN, UP TO 24 HOURS

## 2022-01-01 PROCEDURE — 25000003 PHARM REV CODE 250: Performed by: NURSE PRACTITIONER

## 2022-01-01 PROCEDURE — 3078F PR MOST RECENT DIASTOLIC BLOOD PRESSURE < 80 MM HG: ICD-10-PCS | Mod: CPTII,S$GLB,, | Performed by: NURSE PRACTITIONER

## 2022-01-01 PROCEDURE — 99214 OFFICE O/P EST MOD 30 MIN: CPT | Mod: S$GLB,,, | Performed by: NURSE PRACTITIONER

## 2022-01-01 PROCEDURE — 83690 ASSAY OF LIPASE: CPT | Performed by: HOSPITALIST

## 2022-01-01 PROCEDURE — 99999 PR PBB SHADOW E&M-EST. PATIENT-LVL III: ICD-10-PCS | Mod: PBBFAC,,, | Performed by: NURSE PRACTITIONER

## 2022-01-01 PROCEDURE — 83615 LACTATE (LD) (LDH) ENZYME: CPT | Performed by: EMERGENCY MEDICINE

## 2022-01-01 PROCEDURE — 83880 ASSAY OF NATRIURETIC PEPTIDE: CPT | Performed by: EMERGENCY MEDICINE

## 2022-01-01 PROCEDURE — 99900035 HC TECH TIME PER 15 MIN (STAT)

## 2022-01-01 PROCEDURE — 99214 PR OFFICE/OUTPT VISIT, EST, LEVL IV, 30-39 MIN: ICD-10-PCS | Mod: ,,, | Performed by: INTERNAL MEDICINE

## 2022-01-01 PROCEDURE — 36901 INTRO CATH DIALYSIS CIRCUIT: CPT | Performed by: SURGERY

## 2022-01-01 PROCEDURE — 87040 BLOOD CULTURE FOR BACTERIA: CPT | Mod: 59 | Performed by: EMERGENCY MEDICINE

## 2022-01-01 PROCEDURE — 82728 ASSAY OF FERRITIN: CPT | Performed by: EMERGENCY MEDICINE

## 2022-01-01 PROCEDURE — 86706 HEP B SURFACE ANTIBODY: CPT | Performed by: STUDENT IN AN ORGANIZED HEALTH CARE EDUCATION/TRAINING PROGRAM

## 2022-01-01 PROCEDURE — 25000003 PHARM REV CODE 250: Performed by: EMERGENCY MEDICINE

## 2022-01-01 PROCEDURE — 1126F AMNT PAIN NOTED NONE PRSNT: CPT | Mod: CPTII,S$GLB,, | Performed by: INTERNAL MEDICINE

## 2022-01-01 PROCEDURE — 94799 UNLISTED PULMONARY SVC/PX: CPT

## 2022-01-01 PROCEDURE — 85025 COMPLETE CBC W/AUTO DIFF WBC: CPT | Performed by: NURSE PRACTITIONER

## 2022-01-01 PROCEDURE — 93793 PR ANTICOAGULANT MGMT FOR PT TAKING WARFARIN: ICD-10-PCS | Mod: S$GLB,,,

## 2022-01-01 PROCEDURE — 84100 ASSAY OF PHOSPHORUS: CPT | Performed by: FAMILY MEDICINE

## 2022-01-01 PROCEDURE — 85610 PROTHROMBIN TIME: CPT | Performed by: HOSPITALIST

## 2022-01-01 PROCEDURE — 99284 EMERGENCY DEPT VISIT MOD MDM: CPT | Mod: 25

## 2022-01-01 PROCEDURE — 96375 TX/PRO/DX INJ NEW DRUG ADDON: CPT

## 2022-01-01 PROCEDURE — 99291 CRITICAL CARE FIRST HOUR: CPT

## 2022-01-01 PROCEDURE — 3288F PR FALLS RISK ASSESSMENT DOCUMENTED: ICD-10-PCS | Mod: CPTII,S$GLB,, | Performed by: INTERNAL MEDICINE

## 2022-01-01 PROCEDURE — 83735 ASSAY OF MAGNESIUM: CPT | Performed by: INTERNAL MEDICINE

## 2022-01-01 PROCEDURE — 63600175 PHARM REV CODE 636 W HCPCS: Performed by: NURSE PRACTITIONER

## 2022-01-01 PROCEDURE — 1111F DSCHRG MED/CURRENT MED MERGE: CPT | Mod: CPTII,S$GLB,, | Performed by: NURSE PRACTITIONER

## 2022-01-01 PROCEDURE — 25000003 PHARM REV CODE 250: Performed by: INTERNAL MEDICINE

## 2022-01-01 PROCEDURE — 93010 EKG 12-LEAD: ICD-10-PCS | Mod: ,,, | Performed by: INTERNAL MEDICINE

## 2022-01-01 PROCEDURE — 1101F PT FALLS ASSESS-DOCD LE1/YR: CPT | Mod: CPTII,S$GLB,, | Performed by: NURSE PRACTITIONER

## 2022-01-01 PROCEDURE — 87040 BLOOD CULTURE FOR BACTERIA: CPT | Mod: 59 | Performed by: STUDENT IN AN ORGANIZED HEALTH CARE EDUCATION/TRAINING PROGRAM

## 2022-01-01 PROCEDURE — 93010 ELECTROCARDIOGRAM REPORT: CPT | Mod: ,,, | Performed by: SPECIALIST

## 2022-01-01 PROCEDURE — 94640 AIRWAY INHALATION TREATMENT: CPT

## 2022-01-01 PROCEDURE — 25000242 PHARM REV CODE 250 ALT 637 W/ HCPCS: Performed by: STUDENT IN AN ORGANIZED HEALTH CARE EDUCATION/TRAINING PROGRAM

## 2022-01-01 PROCEDURE — 94761 N-INVAS EAR/PLS OXIMETRY MLT: CPT

## 2022-01-01 PROCEDURE — 36415 COLL VENOUS BLD VENIPUNCTURE: CPT | Performed by: EMERGENCY MEDICINE

## 2022-01-01 PROCEDURE — 63600175 PHARM REV CODE 636 W HCPCS: Performed by: EMERGENCY MEDICINE

## 2022-01-01 PROCEDURE — 99999 PR PBB SHADOW E&M-EST. PATIENT-LVL III: CPT | Mod: PBBFAC,,, | Performed by: NURSE PRACTITIONER

## 2022-01-01 PROCEDURE — 21000000 HC CCU ICU ROOM CHARGE

## 2022-01-01 PROCEDURE — 83690 ASSAY OF LIPASE: CPT | Performed by: NURSE PRACTITIONER

## 2022-01-01 PROCEDURE — 36415 COLL VENOUS BLD VENIPUNCTURE: CPT | Performed by: FAMILY MEDICINE

## 2022-01-01 PROCEDURE — 3078F DIAST BP <80 MM HG: CPT | Mod: CPTII,S$GLB,, | Performed by: NURSE PRACTITIONER

## 2022-01-01 PROCEDURE — 90935 HEMODIALYSIS ONE EVALUATION: CPT

## 2022-01-01 PROCEDURE — 86140 C-REACTIVE PROTEIN: CPT | Mod: 91 | Performed by: NURSE PRACTITIONER

## 2022-01-01 PROCEDURE — 84100 ASSAY OF PHOSPHORUS: CPT | Performed by: HOSPITALIST

## 2022-01-01 PROCEDURE — 84484 ASSAY OF TROPONIN QUANT: CPT | Performed by: EMERGENCY MEDICINE

## 2022-01-01 PROCEDURE — 63600175 PHARM REV CODE 636 W HCPCS: Performed by: INTERNAL MEDICINE

## 2022-01-01 PROCEDURE — 99214 PR OFFICE/OUTPT VISIT, EST, LEVL IV, 30-39 MIN: ICD-10-PCS | Mod: S$GLB,,, | Performed by: INTERNAL MEDICINE

## 2022-01-01 PROCEDURE — 84466 ASSAY OF TRANSFERRIN: CPT | Performed by: INTERNAL MEDICINE

## 2022-01-01 PROCEDURE — 1159F PR MEDICATION LIST DOCUMENTED IN MEDICAL RECORD: ICD-10-PCS | Mod: CPTII,S$GLB,, | Performed by: INTERNAL MEDICINE

## 2022-01-01 PROCEDURE — U0002 COVID-19 LAB TEST NON-CDC: HCPCS | Performed by: NURSE PRACTITIONER

## 2022-01-01 PROCEDURE — 80048 BASIC METABOLIC PNL TOTAL CA: CPT | Performed by: INTERNAL MEDICINE

## 2022-01-01 PROCEDURE — 82272 OCCULT BLD FECES 1-3 TESTS: CPT | Performed by: EMERGENCY MEDICINE

## 2022-01-01 PROCEDURE — 3078F PR MOST RECENT DIASTOLIC BLOOD PRESSURE < 80 MM HG: ICD-10-PCS | Mod: CPTII,S$GLB,, | Performed by: INTERNAL MEDICINE

## 2022-01-01 PROCEDURE — 96376 TX/PRO/DX INJ SAME DRUG ADON: CPT

## 2022-01-01 PROCEDURE — 63600175 PHARM REV CODE 636 W HCPCS: Performed by: HOSPITALIST

## 2022-01-01 PROCEDURE — 83605 ASSAY OF LACTIC ACID: CPT | Performed by: STUDENT IN AN ORGANIZED HEALTH CARE EDUCATION/TRAINING PROGRAM

## 2022-01-01 PROCEDURE — 85025 COMPLETE CBC W/AUTO DIFF WBC: CPT | Performed by: EMERGENCY MEDICINE

## 2022-01-01 PROCEDURE — 12000002 HC ACUTE/MED SURGE SEMI-PRIVATE ROOM

## 2022-01-01 PROCEDURE — 96374 THER/PROPH/DIAG INJ IV PUSH: CPT

## 2022-01-01 PROCEDURE — 86140 C-REACTIVE PROTEIN: CPT | Performed by: EMERGENCY MEDICINE

## 2022-01-01 PROCEDURE — 25000003 PHARM REV CODE 250: Performed by: STUDENT IN AN ORGANIZED HEALTH CARE EDUCATION/TRAINING PROGRAM

## 2022-01-01 PROCEDURE — 86850 RBC ANTIBODY SCREEN: CPT | Performed by: NURSE PRACTITIONER

## 2022-01-01 PROCEDURE — 93010 ELECTROCARDIOGRAM REPORT: CPT | Mod: ,,, | Performed by: INTERNAL MEDICINE

## 2022-01-01 PROCEDURE — 36415 COLL VENOUS BLD VENIPUNCTURE: CPT | Performed by: NURSE PRACTITIONER

## 2022-01-01 PROCEDURE — 1101F PR PT FALLS ASSESS DOC 0-1 FALLS W/OUT INJ PAST YR: ICD-10-PCS | Mod: CPTII,S$GLB,, | Performed by: NURSE PRACTITIONER

## 2022-01-01 PROCEDURE — 85018 HEMOGLOBIN: CPT | Performed by: INTERNAL MEDICINE

## 2022-01-01 PROCEDURE — 25000003 PHARM REV CODE 250: Performed by: HOSPITALIST

## 2022-01-01 PROCEDURE — 87449 NOS EACH ORGANISM AG IA: CPT | Performed by: EMERGENCY MEDICINE

## 2022-01-01 PROCEDURE — 36415 COLL VENOUS BLD VENIPUNCTURE: CPT | Performed by: INTERNAL MEDICINE

## 2022-01-01 PROCEDURE — 85730 THROMBOPLASTIN TIME PARTIAL: CPT | Performed by: EMERGENCY MEDICINE

## 2022-01-01 PROCEDURE — 83605 ASSAY OF LACTIC ACID: CPT | Performed by: EMERGENCY MEDICINE

## 2022-01-01 PROCEDURE — 25000003 PHARM REV CODE 250

## 2022-01-01 PROCEDURE — 87502 INFLUENZA DNA AMP PROBE: CPT | Performed by: EMERGENCY MEDICINE

## 2022-01-01 PROCEDURE — 96367 TX/PROPH/DG ADDL SEQ IV INF: CPT

## 2022-01-01 PROCEDURE — 83690 ASSAY OF LIPASE: CPT | Performed by: FAMILY MEDICINE

## 2022-01-01 PROCEDURE — 87449 NOS EACH ORGANISM AG IA: CPT | Performed by: STUDENT IN AN ORGANIZED HEALTH CARE EDUCATION/TRAINING PROGRAM

## 2022-01-01 PROCEDURE — 93010 EKG 12-LEAD: ICD-10-PCS | Mod: ,,, | Performed by: SPECIALIST

## 2022-01-01 PROCEDURE — 85730 THROMBOPLASTIN TIME PARTIAL: CPT | Performed by: SURGERY

## 2022-01-01 PROCEDURE — 80048 BASIC METABOLIC PNL TOTAL CA: CPT | Performed by: FAMILY MEDICINE

## 2022-01-01 PROCEDURE — 3074F SYST BP LT 130 MM HG: CPT | Mod: CPTII,S$GLB,, | Performed by: NURSE PRACTITIONER

## 2022-01-01 PROCEDURE — 93010 ELECTROCARDIOGRAM REPORT: CPT | Mod: ,,, | Performed by: GENERAL PRACTICE

## 2022-01-01 PROCEDURE — 25000242 PHARM REV CODE 250 ALT 637 W/ HCPCS: Performed by: NURSE PRACTITIONER

## 2022-01-01 PROCEDURE — 99214 PR OFFICE/OUTPT VISIT, EST, LEVL IV, 30-39 MIN: ICD-10-PCS | Mod: S$GLB,,, | Performed by: NURSE PRACTITIONER

## 2022-01-01 PROCEDURE — 99223 1ST HOSP IP/OBS HIGH 75: CPT | Mod: ,,, | Performed by: INTERNAL MEDICINE

## 2022-01-01 PROCEDURE — 82962 GLUCOSE BLOOD TEST: CPT

## 2022-01-01 PROCEDURE — 85610 PROTHROMBIN TIME: CPT | Performed by: SURGERY

## 2022-01-01 PROCEDURE — 87040 BLOOD CULTURE FOR BACTERIA: CPT | Performed by: EMERGENCY MEDICINE

## 2022-01-01 PROCEDURE — C9113 INJ PANTOPRAZOLE SODIUM, VIA: HCPCS | Performed by: NURSE PRACTITIONER

## 2022-01-01 PROCEDURE — 1126F PR PAIN SEVERITY QUANTIFIED, NO PAIN PRESENT: ICD-10-PCS | Mod: CPTII,S$GLB,, | Performed by: INTERNAL MEDICINE

## 2022-01-01 PROCEDURE — G0378 HOSPITAL OBSERVATION PER HR: HCPCS | Mod: CS

## 2022-01-01 PROCEDURE — 63600175 PHARM REV CODE 636 W HCPCS: Performed by: SURGERY

## 2022-01-01 PROCEDURE — 1111F PR DISCHARGE MEDS RECONCILED W/ CURRENT OUTPATIENT MED LIST: ICD-10-PCS | Mod: CPTII,S$GLB,, | Performed by: NURSE PRACTITIONER

## 2022-01-01 PROCEDURE — 3074F PR MOST RECENT SYSTOLIC BLOOD PRESSURE < 130 MM HG: ICD-10-PCS | Mod: CPTII,S$GLB,, | Performed by: NURSE PRACTITIONER

## 2022-01-01 PROCEDURE — 93005 ELECTROCARDIOGRAM TRACING: CPT | Performed by: INTERNAL MEDICINE

## 2022-01-01 PROCEDURE — 3288F PR FALLS RISK ASSESSMENT DOCUMENTED: ICD-10-PCS | Mod: CPTII,S$GLB,, | Performed by: NURSE PRACTITIONER

## 2022-01-01 PROCEDURE — 82746 ASSAY OF FOLIC ACID SERUM: CPT | Performed by: INTERNAL MEDICINE

## 2022-01-01 PROCEDURE — 63700000 PHARM REV CODE 250 ALT 637 W/O HCPCS: Performed by: INTERNAL MEDICINE

## 2022-01-01 PROCEDURE — 84145 PROCALCITONIN (PCT): CPT | Performed by: NURSE PRACTITIONER

## 2022-01-01 PROCEDURE — 87340 HEPATITIS B SURFACE AG IA: CPT | Performed by: STUDENT IN AN ORGANIZED HEALTH CARE EDUCATION/TRAINING PROGRAM

## 2022-01-01 PROCEDURE — 93306 ECHO (CUPID ONLY): ICD-10-PCS | Mod: 26,,, | Performed by: INTERNAL MEDICINE

## 2022-01-01 PROCEDURE — 83690 ASSAY OF LIPASE: CPT | Performed by: EMERGENCY MEDICINE

## 2022-01-01 PROCEDURE — 3288F FALL RISK ASSESSMENT DOCD: CPT | Mod: CPTII,S$GLB,, | Performed by: INTERNAL MEDICINE

## 2022-01-01 PROCEDURE — 85025 COMPLETE CBC W/AUTO DIFF WBC: CPT | Performed by: HOSPITALIST

## 2022-01-01 PROCEDURE — 85610 PROTHROMBIN TIME: CPT | Performed by: EMERGENCY MEDICINE

## 2022-01-01 PROCEDURE — 83605 ASSAY OF LACTIC ACID: CPT | Mod: 91 | Performed by: NURSE PRACTITIONER

## 2022-01-01 PROCEDURE — 82728 ASSAY OF FERRITIN: CPT | Performed by: INTERNAL MEDICINE

## 2022-01-01 PROCEDURE — 63600175 PHARM REV CODE 636 W HCPCS: Performed by: FAMILY MEDICINE

## 2022-01-01 PROCEDURE — U0002 COVID-19 LAB TEST NON-CDC: HCPCS | Performed by: EMERGENCY MEDICINE

## 2022-01-01 PROCEDURE — 36415 COLL VENOUS BLD VENIPUNCTURE: CPT | Performed by: HOSPITALIST

## 2022-01-01 PROCEDURE — 80053 COMPREHEN METABOLIC PANEL: CPT | Performed by: HOSPITALIST

## 2022-01-01 PROCEDURE — 25000242 PHARM REV CODE 250 ALT 637 W/ HCPCS: Performed by: INTERNAL MEDICINE

## 2022-01-01 PROCEDURE — 3075F SYST BP GE 130 - 139MM HG: CPT | Mod: CPTII,S$GLB,, | Performed by: NURSE PRACTITIONER

## 2022-01-01 PROCEDURE — 99223 PR INITIAL HOSPITAL CARE,LEVL III: ICD-10-PCS | Mod: ,,, | Performed by: INTERNAL MEDICINE

## 2022-01-01 PROCEDURE — 85014 HEMATOCRIT: CPT | Performed by: INTERNAL MEDICINE

## 2022-01-01 PROCEDURE — 85025 COMPLETE CBC W/AUTO DIFF WBC: CPT | Performed by: FAMILY MEDICINE

## 2022-01-01 PROCEDURE — 1159F MED LIST DOCD IN RCRD: CPT | Mod: CPTII,S$GLB,, | Performed by: INTERNAL MEDICINE

## 2022-01-01 PROCEDURE — 99292 CRITICAL CARE ADDL 30 MIN: CPT

## 2022-01-01 PROCEDURE — 25500020 PHARM REV CODE 255: Performed by: EMERGENCY MEDICINE

## 2022-01-01 PROCEDURE — 1101F PR PT FALLS ASSESS DOC 0-1 FALLS W/OUT INJ PAST YR: ICD-10-PCS | Mod: CPTII,S$GLB,, | Performed by: INTERNAL MEDICINE

## 2022-01-01 PROCEDURE — 83880 ASSAY OF NATRIURETIC PEPTIDE: CPT | Performed by: NURSE PRACTITIONER

## 2022-01-01 PROCEDURE — 63600175 PHARM REV CODE 636 W HCPCS: Performed by: STUDENT IN AN ORGANIZED HEALTH CARE EDUCATION/TRAINING PROGRAM

## 2022-01-01 PROCEDURE — 93306 TTE W/DOPPLER COMPLETE: CPT | Mod: 26,,, | Performed by: INTERNAL MEDICINE

## 2022-01-01 PROCEDURE — 87209 SMEAR COMPLEX STAIN: CPT | Performed by: EMERGENCY MEDICINE

## 2022-01-01 PROCEDURE — 1126F PR PAIN SEVERITY QUANTIFIED, NO PAIN PRESENT: ICD-10-PCS | Mod: CPTII,S$GLB,, | Performed by: NURSE PRACTITIONER

## 2022-01-01 PROCEDURE — 93306 TTE W/DOPPLER COMPLETE: CPT

## 2022-01-01 PROCEDURE — 94760 N-INVAS EAR/PLS OXIMETRY 1: CPT | Mod: XB

## 2022-01-01 PROCEDURE — G0378 HOSPITAL OBSERVATION PER HR: HCPCS

## 2022-01-01 PROCEDURE — 93005 ELECTROCARDIOGRAM TRACING: CPT | Performed by: GENERAL PRACTICE

## 2022-01-01 PROCEDURE — 84100 ASSAY OF PHOSPHORUS: CPT | Performed by: NURSE PRACTITIONER

## 2022-01-01 PROCEDURE — 85025 COMPLETE CBC W/AUTO DIFF WBC: CPT | Performed by: INTERNAL MEDICINE

## 2022-01-01 PROCEDURE — 1159F PR MEDICATION LIST DOCUMENTED IN MEDICAL RECORD: ICD-10-PCS | Mod: CPTII,S$GLB,, | Performed by: NURSE PRACTITIONER

## 2022-01-01 PROCEDURE — C1894 INTRO/SHEATH, NON-LASER: HCPCS | Performed by: SURGERY

## 2022-01-01 PROCEDURE — 25000242 PHARM REV CODE 250 ALT 637 W/ HCPCS: Performed by: EMERGENCY MEDICINE

## 2022-01-01 PROCEDURE — 80053 COMPREHEN METABOLIC PANEL: CPT | Performed by: NURSE PRACTITIONER

## 2022-01-01 PROCEDURE — 83605 ASSAY OF LACTIC ACID: CPT | Mod: 91 | Performed by: FAMILY MEDICINE

## 2022-01-01 PROCEDURE — 31500 INSERT EMERGENCY AIRWAY: CPT

## 2022-01-01 PROCEDURE — 1159F MED LIST DOCD IN RCRD: CPT | Mod: CPTII,S$GLB,, | Performed by: NURSE PRACTITIONER

## 2022-01-01 PROCEDURE — 84100 ASSAY OF PHOSPHORUS: CPT | Performed by: STUDENT IN AN ORGANIZED HEALTH CARE EDUCATION/TRAINING PROGRAM

## 2022-01-01 PROCEDURE — 86900 BLOOD TYPING SEROLOGIC ABO: CPT | Performed by: NURSE PRACTITIONER

## 2022-01-01 PROCEDURE — 99214 OFFICE O/P EST MOD 30 MIN: CPT | Mod: ,,, | Performed by: INTERNAL MEDICINE

## 2022-01-01 PROCEDURE — 84484 ASSAY OF TROPONIN QUANT: CPT | Performed by: HOSPITALIST

## 2022-01-01 PROCEDURE — C9113 INJ PANTOPRAZOLE SODIUM, VIA: HCPCS | Performed by: EMERGENCY MEDICINE

## 2022-01-01 PROCEDURE — 20000000 HC ICU ROOM

## 2022-01-01 PROCEDURE — 84484 ASSAY OF TROPONIN QUANT: CPT | Mod: 91 | Performed by: NURSE PRACTITIONER

## 2022-01-01 PROCEDURE — 1160F PR REVIEW ALL MEDS BY PRESCRIBER/CLIN PHARMACIST DOCUMENTED: ICD-10-PCS | Mod: CPTII,S$GLB,, | Performed by: NURSE PRACTITIONER

## 2022-01-01 PROCEDURE — 36600 WITHDRAWAL OF ARTERIAL BLOOD: CPT

## 2022-01-01 PROCEDURE — 86901 BLOOD TYPING SEROLOGIC RH(D): CPT | Performed by: NURSE PRACTITIONER

## 2022-01-01 PROCEDURE — 3075F PR MOST RECENT SYSTOLIC BLOOD PRESS GE 130-139MM HG: ICD-10-PCS | Mod: CPTII,S$GLB,, | Performed by: NURSE PRACTITIONER

## 2022-01-01 PROCEDURE — 84484 ASSAY OF TROPONIN QUANT: CPT | Mod: 91 | Performed by: INTERNAL MEDICINE

## 2022-01-01 PROCEDURE — 82607 VITAMIN B-12: CPT | Performed by: INTERNAL MEDICINE

## 2022-01-01 PROCEDURE — 85610 PROTHROMBIN TIME: CPT | Performed by: INTERNAL MEDICINE

## 2022-01-01 PROCEDURE — 84100 ASSAY OF PHOSPHORUS: CPT | Performed by: INTERNAL MEDICINE

## 2022-01-01 PROCEDURE — 85730 THROMBOPLASTIN TIME PARTIAL: CPT | Performed by: NURSE PRACTITIONER

## 2022-01-01 PROCEDURE — 87045 FECES CULTURE AEROBIC BACT: CPT | Performed by: EMERGENCY MEDICINE

## 2022-01-01 PROCEDURE — 94640 AIRWAY INHALATION TREATMENT: CPT | Mod: 76

## 2022-01-01 PROCEDURE — 3074F PR MOST RECENT SYSTOLIC BLOOD PRESSURE < 130 MM HG: ICD-10-PCS | Mod: CPTII,S$GLB,, | Performed by: INTERNAL MEDICINE

## 2022-01-01 PROCEDURE — 85610 PROTHROMBIN TIME: CPT | Performed by: NURSE PRACTITIONER

## 2022-01-01 PROCEDURE — 99285 EMERGENCY DEPT VISIT HI MDM: CPT | Mod: 25,CS

## 2022-01-01 PROCEDURE — 80048 BASIC METABOLIC PNL TOTAL CA: CPT | Performed by: SURGERY

## 2022-01-01 PROCEDURE — 83735 ASSAY OF MAGNESIUM: CPT | Performed by: NURSE PRACTITIONER

## 2022-01-01 PROCEDURE — 25000003 PHARM REV CODE 250: Performed by: SURGERY

## 2022-01-01 PROCEDURE — 93005 ELECTROCARDIOGRAM TRACING: CPT | Performed by: SPECIALIST

## 2022-01-01 PROCEDURE — 1160F RVW MEDS BY RX/DR IN RCRD: CPT | Mod: CPTII,S$GLB,, | Performed by: NURSE PRACTITIONER

## 2022-01-01 PROCEDURE — 99285 EMERGENCY DEPT VISIT HI MDM: CPT | Mod: 25

## 2022-01-01 PROCEDURE — 82272 OCCULT BLD FECES 1-3 TESTS: CPT | Performed by: STUDENT IN AN ORGANIZED HEALTH CARE EDUCATION/TRAINING PROGRAM

## 2022-01-01 PROCEDURE — 85610 PROTHROMBIN TIME: CPT | Performed by: STUDENT IN AN ORGANIZED HEALTH CARE EDUCATION/TRAINING PROGRAM

## 2022-01-01 PROCEDURE — 99999 PR PBB SHADOW E&M-EST. PATIENT-LVL IV: CPT | Mod: PBBFAC,,, | Performed by: NURSE PRACTITIONER

## 2022-01-01 PROCEDURE — C1769 GUIDE WIRE: HCPCS | Performed by: SURGERY

## 2022-01-01 PROCEDURE — 99999 PR PBB SHADOW E&M-EST. PATIENT-LVL IV: ICD-10-PCS | Mod: PBBFAC,,, | Performed by: NURSE PRACTITIONER

## 2022-01-01 PROCEDURE — 87147 CULTURE TYPE IMMUNOLOGIC: CPT | Performed by: EMERGENCY MEDICINE

## 2022-01-01 PROCEDURE — 83735 ASSAY OF MAGNESIUM: CPT | Performed by: FAMILY MEDICINE

## 2022-01-01 PROCEDURE — 82803 BLOOD GASES ANY COMBINATION: CPT

## 2022-01-01 PROCEDURE — 93010 EKG 12-LEAD: ICD-10-PCS | Mod: ,,, | Performed by: GENERAL PRACTICE

## 2022-01-01 PROCEDURE — 96376 TX/PRO/DX INJ SAME DRUG ADON: CPT | Mod: 59

## 2022-01-01 PROCEDURE — 89055 LEUKOCYTE ASSESSMENT FECAL: CPT | Performed by: EMERGENCY MEDICINE

## 2022-01-01 PROCEDURE — 83735 ASSAY OF MAGNESIUM: CPT | Performed by: EMERGENCY MEDICINE

## 2022-01-01 PROCEDURE — 96360 HYDRATION IV INFUSION INIT: CPT

## 2022-01-01 PROCEDURE — 25000242 PHARM REV CODE 250 ALT 637 W/ HCPCS: Performed by: HOSPITALIST

## 2022-01-01 PROCEDURE — 3288F FALL RISK ASSESSMENT DOCD: CPT | Mod: CPTII,S$GLB,, | Performed by: NURSE PRACTITIONER

## 2022-01-01 PROCEDURE — 1160F RVW MEDS BY RX/DR IN RCRD: CPT | Mod: CPTII,S$GLB,, | Performed by: INTERNAL MEDICINE

## 2022-01-01 PROCEDURE — 85014 HEMATOCRIT: CPT | Mod: 91 | Performed by: INTERNAL MEDICINE

## 2022-01-01 PROCEDURE — 83605 ASSAY OF LACTIC ACID: CPT | Performed by: NURSE PRACTITIONER

## 2022-01-01 PROCEDURE — U0002 COVID-19 LAB TEST NON-CDC: HCPCS | Performed by: STUDENT IN AN ORGANIZED HEALTH CARE EDUCATION/TRAINING PROGRAM

## 2022-01-01 PROCEDURE — 1101F PT FALLS ASSESS-DOCD LE1/YR: CPT | Mod: CPTII,S$GLB,, | Performed by: INTERNAL MEDICINE

## 2022-01-01 PROCEDURE — 96366 THER/PROPH/DIAG IV INF ADDON: CPT

## 2022-01-01 PROCEDURE — 85610 PROTHROMBIN TIME: CPT | Performed by: FAMILY MEDICINE

## 2022-01-01 PROCEDURE — 99214 OFFICE O/P EST MOD 30 MIN: CPT | Mod: S$GLB,,, | Performed by: INTERNAL MEDICINE

## 2022-01-01 PROCEDURE — 86704 HEP B CORE ANTIBODY TOTAL: CPT | Performed by: STUDENT IN AN ORGANIZED HEALTH CARE EDUCATION/TRAINING PROGRAM

## 2022-01-01 PROCEDURE — 96365 THER/PROPH/DIAG IV INF INIT: CPT

## 2022-01-01 PROCEDURE — 84484 ASSAY OF TROPONIN QUANT: CPT | Performed by: INTERNAL MEDICINE

## 2022-01-01 PROCEDURE — 1160F PR REVIEW ALL MEDS BY PRESCRIBER/CLIN PHARMACIST DOCUMENTED: ICD-10-PCS | Mod: CPTII,S$GLB,, | Performed by: INTERNAL MEDICINE

## 2022-01-01 PROCEDURE — 94660 CPAP INITIATION&MGMT: CPT

## 2022-01-01 PROCEDURE — 80048 BASIC METABOLIC PNL TOTAL CA: CPT | Performed by: NURSE PRACTITIONER

## 2022-01-01 PROCEDURE — 1126F AMNT PAIN NOTED NONE PRSNT: CPT | Mod: CPTII,S$GLB,, | Performed by: NURSE PRACTITIONER

## 2022-01-01 PROCEDURE — 3074F SYST BP LT 130 MM HG: CPT | Mod: CPTII,S$GLB,, | Performed by: INTERNAL MEDICINE

## 2022-01-01 PROCEDURE — 96375 TX/PRO/DX INJ NEW DRUG ADDON: CPT | Mod: 59

## 2022-01-01 PROCEDURE — 3078F DIAST BP <80 MM HG: CPT | Mod: CPTII,S$GLB,, | Performed by: INTERNAL MEDICINE

## 2022-01-01 PROCEDURE — 87493 C DIFF AMPLIFIED PROBE: CPT | Performed by: STUDENT IN AN ORGANIZED HEALTH CARE EDUCATION/TRAINING PROGRAM

## 2022-01-01 PROCEDURE — 85027 COMPLETE CBC AUTOMATED: CPT | Performed by: SURGERY

## 2022-01-01 PROCEDURE — 96365 THER/PROPH/DIAG IV INF INIT: CPT | Mod: 59

## 2022-01-01 PROCEDURE — 36415 COLL VENOUS BLD VENIPUNCTURE: CPT | Performed by: STUDENT IN AN ORGANIZED HEALTH CARE EDUCATION/TRAINING PROGRAM

## 2022-01-01 PROCEDURE — 87046 STOOL CULTR AEROBIC BACT EA: CPT | Mod: 59 | Performed by: EMERGENCY MEDICINE

## 2022-01-01 PROCEDURE — 82550 ASSAY OF CK (CPK): CPT | Performed by: EMERGENCY MEDICINE

## 2022-01-01 RX ORDER — WARFARIN 1 MG/1
2 TABLET ORAL DAILY
Status: COMPLETED | OUTPATIENT
Start: 2022-01-01 | End: 2022-01-01

## 2022-01-01 RX ORDER — LORAZEPAM 0.5 MG/1
0.5 TABLET ORAL EVERY 6 HOURS PRN
Status: DISCONTINUED | OUTPATIENT
Start: 2022-01-01 | End: 2022-01-01 | Stop reason: HOSPADM

## 2022-01-01 RX ORDER — SODIUM CHLORIDE, SODIUM LACTATE, POTASSIUM CHLORIDE, CALCIUM CHLORIDE 600; 310; 30; 20 MG/100ML; MG/100ML; MG/100ML; MG/100ML
INJECTION, SOLUTION INTRAVENOUS CONTINUOUS
Status: DISCONTINUED | OUTPATIENT
Start: 2022-01-01 | End: 2022-01-01

## 2022-01-01 RX ORDER — ACETAMINOPHEN 325 MG/1
650 TABLET ORAL EVERY 8 HOURS PRN
Status: DISCONTINUED | OUTPATIENT
Start: 2022-01-01 | End: 2022-01-01 | Stop reason: HOSPADM

## 2022-01-01 RX ORDER — ACETAMINOPHEN 325 MG/1
650 TABLET ORAL EVERY 4 HOURS PRN
Status: DISCONTINUED | OUTPATIENT
Start: 2022-01-01 | End: 2022-01-01 | Stop reason: HOSPADM

## 2022-01-01 RX ORDER — POLYETHYLENE GLYCOL 3350 17 G/17G
17 POWDER, FOR SOLUTION ORAL DAILY PRN
Status: DISCONTINUED | OUTPATIENT
Start: 2022-01-01 | End: 2022-01-01 | Stop reason: HOSPADM

## 2022-01-01 RX ORDER — WARFARIN SODIUM 5 MG/1
5 TABLET ORAL DAILY
Status: DISCONTINUED | OUTPATIENT
Start: 2022-01-01 | End: 2022-01-01 | Stop reason: HOSPADM

## 2022-01-01 RX ORDER — AMIODARONE HYDROCHLORIDE 200 MG/1
200 TABLET ORAL 3 TIMES DAILY
Status: DISCONTINUED | OUTPATIENT
Start: 2022-01-01 | End: 2022-01-01 | Stop reason: HOSPADM

## 2022-01-01 RX ORDER — MORPHINE SULFATE 2 MG/ML
2 INJECTION, SOLUTION INTRAMUSCULAR; INTRAVENOUS
Status: COMPLETED | OUTPATIENT
Start: 2022-01-01 | End: 2022-01-01

## 2022-01-01 RX ORDER — MONTELUKAST SODIUM 10 MG/1
10 TABLET ORAL NIGHTLY
Status: ON HOLD | COMMUNITY
End: 2022-01-01 | Stop reason: HOSPADM

## 2022-01-01 RX ORDER — SODIUM CHLORIDE 0.9 % (FLUSH) 0.9 %
10 SYRINGE (ML) INJECTION EVERY 12 HOURS PRN
Status: DISCONTINUED | OUTPATIENT
Start: 2022-01-01 | End: 2022-01-01 | Stop reason: HOSPADM

## 2022-01-01 RX ORDER — SODIUM CHLORIDE 9 MG/ML
INJECTION, SOLUTION INTRAVENOUS
Status: CANCELLED | OUTPATIENT
Start: 2022-01-01

## 2022-01-01 RX ORDER — MAGNESIUM SULFATE 1 G/100ML
1 INJECTION INTRAVENOUS ONCE
Status: COMPLETED | OUTPATIENT
Start: 2022-01-01 | End: 2022-01-01

## 2022-01-01 RX ORDER — HEPARIN SODIUM 5000 [USP'U]/ML
5000 INJECTION, SOLUTION INTRAVENOUS; SUBCUTANEOUS EVERY 8 HOURS
Status: DISCONTINUED | OUTPATIENT
Start: 2022-01-01 | End: 2022-01-01

## 2022-01-01 RX ORDER — MIDODRINE HYDROCHLORIDE 2.5 MG/1
2.5 TABLET ORAL 3 TIMES DAILY
Status: DISCONTINUED | OUTPATIENT
Start: 2022-01-01 | End: 2022-01-01 | Stop reason: HOSPADM

## 2022-01-01 RX ORDER — PREDNISONE 20 MG/1
20 TABLET ORAL DAILY
Qty: 5 TABLET | Refills: 0 | Status: SHIPPED | OUTPATIENT
Start: 2022-01-01 | End: 2022-05-11

## 2022-01-01 RX ORDER — ASPIRIN 81 MG/1
81 TABLET ORAL DAILY
Status: DISCONTINUED | OUTPATIENT
Start: 2022-01-01 | End: 2022-01-01 | Stop reason: HOSPADM

## 2022-01-01 RX ORDER — IPRATROPIUM BROMIDE AND ALBUTEROL SULFATE 2.5; .5 MG/3ML; MG/3ML
3 SOLUTION RESPIRATORY (INHALATION) EVERY 6 HOURS
Status: DISCONTINUED | OUTPATIENT
Start: 2022-01-01 | End: 2022-01-01 | Stop reason: HOSPADM

## 2022-01-01 RX ORDER — LEVALBUTEROL INHALATION SOLUTION 1.25 MG/3ML
1.25 SOLUTION RESPIRATORY (INHALATION)
Status: COMPLETED | OUTPATIENT
Start: 2022-01-01 | End: 2022-01-01

## 2022-01-01 RX ORDER — GABAPENTIN 300 MG/1
300 CAPSULE ORAL ONCE
Status: COMPLETED | OUTPATIENT
Start: 2022-01-01 | End: 2022-01-01

## 2022-01-01 RX ORDER — PREDNISONE 20 MG/1
20 TABLET ORAL DAILY
Status: DISCONTINUED | OUTPATIENT
Start: 2022-01-01 | End: 2022-01-01 | Stop reason: HOSPADM

## 2022-01-01 RX ORDER — ONDANSETRON 2 MG/ML
4 INJECTION INTRAMUSCULAR; INTRAVENOUS EVERY 6 HOURS PRN
Status: DISCONTINUED | OUTPATIENT
Start: 2022-01-01 | End: 2022-01-01 | Stop reason: HOSPADM

## 2022-01-01 RX ORDER — METOPROLOL TARTRATE 1 MG/ML
5 INJECTION, SOLUTION INTRAVENOUS EVERY 4 HOURS PRN
Status: DISCONTINUED | OUTPATIENT
Start: 2022-01-01 | End: 2022-01-01 | Stop reason: HOSPADM

## 2022-01-01 RX ORDER — ONDANSETRON 2 MG/ML
8 INJECTION INTRAMUSCULAR; INTRAVENOUS
Status: DISPENSED | OUTPATIENT
Start: 2022-01-01 | End: 2022-01-01

## 2022-01-01 RX ORDER — SODIUM CHLORIDE 9 MG/ML
500 INJECTION, SOLUTION INTRAVENOUS
Status: COMPLETED | OUTPATIENT
Start: 2022-01-01 | End: 2022-01-01

## 2022-01-01 RX ORDER — LIDOCAINE HYDROCHLORIDE 10 MG/ML
INJECTION, SOLUTION EPIDURAL; INFILTRATION; INTRACAUDAL; PERINEURAL
Status: DISCONTINUED | OUTPATIENT
Start: 2022-01-01 | End: 2022-01-01 | Stop reason: HOSPADM

## 2022-01-01 RX ORDER — GABAPENTIN 100 MG/1
200 CAPSULE ORAL ONCE
Status: COMPLETED | OUTPATIENT
Start: 2022-01-01 | End: 2022-01-01

## 2022-01-01 RX ORDER — ISOSORBIDE MONONITRATE 30 MG/1
30 TABLET, EXTENDED RELEASE ORAL DAILY
Status: DISCONTINUED | OUTPATIENT
Start: 2022-01-01 | End: 2022-01-01 | Stop reason: HOSPADM

## 2022-01-01 RX ORDER — LEVALBUTEROL INHALATION SOLUTION 0.63 MG/3ML
0.63 SOLUTION RESPIRATORY (INHALATION) EVERY 8 HOURS
Status: DISCONTINUED | OUTPATIENT
Start: 2022-01-01 | End: 2022-01-01 | Stop reason: HOSPADM

## 2022-01-01 RX ORDER — CALCITRIOL 0.5 UG/1
0.5 CAPSULE ORAL
Qty: 12 CAPSULE | Refills: 2 | Status: SHIPPED | OUTPATIENT
Start: 2022-01-01 | End: 2022-01-01

## 2022-01-01 RX ORDER — ISOSORBIDE MONONITRATE 30 MG/1
30 TABLET, EXTENDED RELEASE ORAL DAILY
Status: DISCONTINUED | OUTPATIENT
Start: 2022-01-01 | End: 2022-01-01

## 2022-01-01 RX ORDER — SODIUM CHLORIDE 9 MG/ML
INJECTION, SOLUTION INTRAVENOUS ONCE
Status: CANCELLED | OUTPATIENT
Start: 2022-01-01 | End: 2022-01-01

## 2022-01-01 RX ORDER — LANOLIN ALCOHOL/MO/W.PET/CERES
1000 CREAM (GRAM) TOPICAL DAILY
Status: DISCONTINUED | OUTPATIENT
Start: 2022-01-01 | End: 2022-01-01 | Stop reason: HOSPADM

## 2022-01-01 RX ORDER — MUPIROCIN 20 MG/G
OINTMENT TOPICAL 2 TIMES DAILY
Status: DISCONTINUED | OUTPATIENT
Start: 2022-01-01 | End: 2022-01-01 | Stop reason: HOSPADM

## 2022-01-01 RX ORDER — FAMOTIDINE 20 MG/1
20 TABLET, FILM COATED ORAL
Status: DISCONTINUED | OUTPATIENT
Start: 2022-01-01 | End: 2022-01-01 | Stop reason: HOSPADM

## 2022-01-01 RX ORDER — ALBUTEROL SULFATE 0.83 MG/ML
1.25 SOLUTION RESPIRATORY (INHALATION) EVERY 6 HOURS PRN
Status: DISCONTINUED | OUTPATIENT
Start: 2022-01-01 | End: 2022-01-01 | Stop reason: HOSPADM

## 2022-01-01 RX ORDER — DEXTROSE 50 % IN WATER (D50W) INTRAVENOUS SYRINGE
25
Status: COMPLETED | OUTPATIENT
Start: 2022-01-01 | End: 2022-01-01

## 2022-01-01 RX ORDER — CHLORHEXIDINE GLUCONATE ORAL RINSE 1.2 MG/ML
15 SOLUTION DENTAL 2 TIMES DAILY
Status: DISCONTINUED | OUTPATIENT
Start: 2022-01-01 | End: 2022-01-01 | Stop reason: HOSPADM

## 2022-01-01 RX ORDER — METOPROLOL SUCCINATE 25 MG/1
25 TABLET, EXTENDED RELEASE ORAL NIGHTLY
Status: DISCONTINUED | OUTPATIENT
Start: 2022-01-01 | End: 2022-01-01 | Stop reason: HOSPADM

## 2022-01-01 RX ORDER — PANTOPRAZOLE SODIUM 40 MG/1
40 TABLET, DELAYED RELEASE ORAL DAILY
Status: DISCONTINUED | OUTPATIENT
Start: 2022-01-01 | End: 2022-01-01 | Stop reason: HOSPADM

## 2022-01-01 RX ORDER — NALOXONE HCL 0.4 MG/ML
0.02 VIAL (ML) INJECTION
Status: DISCONTINUED | OUTPATIENT
Start: 2022-01-01 | End: 2022-01-01 | Stop reason: HOSPADM

## 2022-01-01 RX ORDER — WARFARIN 2.5 MG/1
2.5 TABLET ORAL DAILY
Status: DISCONTINUED | OUTPATIENT
Start: 2022-01-01 | End: 2022-01-01

## 2022-01-01 RX ORDER — IPRATROPIUM BROMIDE AND ALBUTEROL SULFATE 2.5; .5 MG/3ML; MG/3ML
3 SOLUTION RESPIRATORY (INHALATION) EVERY 8 HOURS PRN
Status: DISCONTINUED | OUTPATIENT
Start: 2022-01-01 | End: 2022-01-01

## 2022-01-01 RX ORDER — SODIUM CHLORIDE 0.9 % (FLUSH) 0.9 %
10 SYRINGE (ML) INJECTION
Status: DISCONTINUED | OUTPATIENT
Start: 2022-01-01 | End: 2022-01-01 | Stop reason: HOSPADM

## 2022-01-01 RX ORDER — PANTOPRAZOLE SODIUM 40 MG/10ML
40 INJECTION, POWDER, LYOPHILIZED, FOR SOLUTION INTRAVENOUS
Status: COMPLETED | OUTPATIENT
Start: 2022-01-01 | End: 2022-01-01

## 2022-01-01 RX ORDER — WARFARIN 3 MG/1
3 TABLET ORAL DAILY
Qty: 30 TABLET | Refills: 0 | Status: SHIPPED | OUTPATIENT
Start: 2022-01-01 | End: 2022-05-31

## 2022-01-01 RX ORDER — DEXTROSE, SODIUM CHLORIDE, SODIUM LACTATE, POTASSIUM CHLORIDE, AND CALCIUM CHLORIDE 5; .6; .31; .03; .02 G/100ML; G/100ML; G/100ML; G/100ML; G/100ML
INJECTION, SOLUTION INTRAVENOUS CONTINUOUS
Status: DISCONTINUED | OUTPATIENT
Start: 2022-01-01 | End: 2022-01-01 | Stop reason: HOSPADM

## 2022-01-01 RX ORDER — FUROSEMIDE 10 MG/ML
40 INJECTION INTRAMUSCULAR; INTRAVENOUS
Status: COMPLETED | OUTPATIENT
Start: 2022-01-01 | End: 2022-01-01

## 2022-01-01 RX ORDER — WARFARIN SODIUM 5 MG/1
5 TABLET ORAL DAILY
Qty: 30 TABLET | Refills: 0
Start: 2022-01-01 | End: 2022-01-01

## 2022-01-01 RX ORDER — PREDNISONE 20 MG/1
20 TABLET ORAL DAILY
Qty: 3 TABLET | Refills: 0 | Status: SHIPPED | OUTPATIENT
Start: 2022-01-01 | End: 2022-01-01 | Stop reason: SDUPTHER

## 2022-01-01 RX ORDER — CALCITRIOL 0.25 UG/1
0.5 CAPSULE ORAL
Status: DISCONTINUED | OUTPATIENT
Start: 2022-01-01 | End: 2022-01-01 | Stop reason: HOSPADM

## 2022-01-01 RX ORDER — SODIUM CHLORIDE 0.9 % (FLUSH) 0.9 %
2 SYRINGE (ML) INJECTION
Status: DISCONTINUED | OUTPATIENT
Start: 2022-01-01 | End: 2022-01-01 | Stop reason: HOSPADM

## 2022-01-01 RX ORDER — LEVALBUTEROL INHALATION SOLUTION 0.63 MG/3ML
0.63 SOLUTION RESPIRATORY (INHALATION) EVERY 4 HOURS PRN
Status: DISCONTINUED | OUTPATIENT
Start: 2022-01-01 | End: 2022-01-01 | Stop reason: HOSPADM

## 2022-01-01 RX ORDER — ACETAMINOPHEN 325 MG/1
650 TABLET ORAL
Status: COMPLETED | OUTPATIENT
Start: 2022-01-01 | End: 2022-01-01

## 2022-01-01 RX ORDER — LOSARTAN POTASSIUM 25 MG/1
25 TABLET ORAL NIGHTLY
Status: DISCONTINUED | OUTPATIENT
Start: 2022-01-01 | End: 2022-01-01 | Stop reason: HOSPADM

## 2022-01-01 RX ORDER — EPINEPHRINE 0.1 MG/ML
INJECTION INTRAVENOUS CODE/TRAUMA/SEDATION MEDICATION
Status: COMPLETED | OUTPATIENT
Start: 2022-01-01 | End: 2022-01-01

## 2022-01-01 RX ORDER — METOPROLOL TARTRATE 1 MG/ML
2.5 INJECTION, SOLUTION INTRAVENOUS EVERY 4 HOURS PRN
Status: DISCONTINUED | OUTPATIENT
Start: 2022-01-01 | End: 2022-01-01 | Stop reason: HOSPADM

## 2022-01-01 RX ORDER — ASPIRIN 325 MG
325 TABLET ORAL
Status: DISPENSED | OUTPATIENT
Start: 2022-01-01 | End: 2022-01-01

## 2022-01-01 RX ORDER — PANTOPRAZOLE SODIUM 40 MG/10ML
40 INJECTION, POWDER, LYOPHILIZED, FOR SOLUTION INTRAVENOUS
Status: DISPENSED | OUTPATIENT
Start: 2022-01-01 | End: 2022-01-01

## 2022-01-01 RX ORDER — MORPHINE SULFATE 2 MG/ML
2 INJECTION, SOLUTION INTRAMUSCULAR; INTRAVENOUS EVERY 4 HOURS PRN
Status: CANCELLED | OUTPATIENT
Start: 2022-01-01

## 2022-01-01 RX ORDER — ONDANSETRON 2 MG/ML
4 INJECTION INTRAMUSCULAR; INTRAVENOUS
Status: COMPLETED | OUTPATIENT
Start: 2022-01-01 | End: 2022-01-01

## 2022-01-01 RX ORDER — ONDANSETRON 2 MG/ML
4 INJECTION INTRAMUSCULAR; INTRAVENOUS EVERY 8 HOURS PRN
Status: DISCONTINUED | OUTPATIENT
Start: 2022-01-01 | End: 2022-01-01 | Stop reason: HOSPADM

## 2022-01-01 RX ORDER — BUTYROSPERMUM PARKII(SHEA BUTTER), SIMMONDSIA CHINENSIS (JOJOBA) SEED OIL, ALOE BARBADENSIS LEAF EXTRACT .01; 1; 3.5 G/100G; G/100G; G/100G
1 LIQUID TOPICAL DAILY
COMMUNITY
Start: 2022-01-01

## 2022-01-01 RX ORDER — SIMETHICONE 80 MG
1 TABLET,CHEWABLE ORAL 4 TIMES DAILY PRN
Status: DISCONTINUED | OUTPATIENT
Start: 2022-01-01 | End: 2022-01-01 | Stop reason: HOSPADM

## 2022-01-01 RX ORDER — DILTIAZEM HYDROCHLORIDE 30 MG/1
30 TABLET, FILM COATED ORAL
Status: DISCONTINUED | OUTPATIENT
Start: 2022-01-01 | End: 2022-01-01 | Stop reason: HOSPADM

## 2022-01-01 RX ORDER — OXYCODONE AND ACETAMINOPHEN 5; 325 MG/1; MG/1
1 TABLET ORAL EVERY 4 HOURS PRN
Status: DISCONTINUED | OUTPATIENT
Start: 2022-01-01 | End: 2022-01-01 | Stop reason: HOSPADM

## 2022-01-01 RX ORDER — WARFARIN 2.5 MG/1
2.5 TABLET ORAL ONCE
Status: COMPLETED | OUTPATIENT
Start: 2022-01-01 | End: 2022-01-01

## 2022-01-01 RX ORDER — TALC
6 POWDER (GRAM) TOPICAL NIGHTLY PRN
Status: DISCONTINUED | OUTPATIENT
Start: 2022-01-01 | End: 2022-01-01 | Stop reason: HOSPADM

## 2022-01-01 RX ORDER — PROCHLORPERAZINE EDISYLATE 5 MG/ML
5 INJECTION INTRAMUSCULAR; INTRAVENOUS EVERY 6 HOURS PRN
Status: DISCONTINUED | OUTPATIENT
Start: 2022-01-01 | End: 2022-01-01 | Stop reason: HOSPADM

## 2022-01-01 RX ORDER — MIDAZOLAM HYDROCHLORIDE 1 MG/ML
INJECTION INTRAMUSCULAR; INTRAVENOUS
Status: DISCONTINUED | OUTPATIENT
Start: 2022-01-01 | End: 2022-01-01 | Stop reason: HOSPADM

## 2022-01-01 RX ORDER — WARFARIN 1 MG/1
3 TABLET ORAL DAILY
Status: DISCONTINUED | OUTPATIENT
Start: 2022-01-01 | End: 2022-01-01 | Stop reason: HOSPADM

## 2022-01-01 RX ORDER — LEVALBUTEROL INHALATION SOLUTION 0.63 MG/3ML
0.63 SOLUTION RESPIRATORY (INHALATION) EVERY 8 HOURS PRN
Status: DISCONTINUED | OUTPATIENT
Start: 2022-01-01 | End: 2022-01-01

## 2022-01-01 RX ORDER — WARFARIN SODIUM 5 MG/1
5 TABLET ORAL DAILY
Qty: 30 TABLET | Refills: 0 | Status: ON HOLD
Start: 2022-01-01 | End: 2022-01-01 | Stop reason: SDUPTHER

## 2022-01-01 RX ORDER — AMIODARONE HYDROCHLORIDE 200 MG/1
200 TABLET ORAL 2 TIMES DAILY
Qty: 30 TABLET | Refills: 0 | Status: SHIPPED | OUTPATIENT
Start: 2022-01-01 | End: 2022-05-15

## 2022-01-01 RX ORDER — HYDRALAZINE HYDROCHLORIDE 25 MG/1
50 TABLET, FILM COATED ORAL EVERY 8 HOURS
Status: DISCONTINUED | OUTPATIENT
Start: 2022-01-01 | End: 2022-01-01

## 2022-01-01 RX ORDER — WARFARIN SODIUM 5 MG/1
5 TABLET ORAL DAILY
Status: DISCONTINUED | OUTPATIENT
Start: 2022-01-01 | End: 2022-01-01

## 2022-01-01 RX ORDER — LANOLIN ALCOHOL/MO/W.PET/CERES
1000 CREAM (GRAM) TOPICAL DAILY
Qty: 30 TABLET | Refills: 0 | Status: SHIPPED | OUTPATIENT
Start: 2022-01-01 | End: 2022-01-01

## 2022-01-01 RX ORDER — LORAZEPAM 0.5 MG/1
0.5 TABLET ORAL EVERY 12 HOURS PRN
Qty: 14 TABLET | Refills: 0 | Status: SHIPPED | OUTPATIENT
Start: 2022-01-01 | End: 2022-01-01

## 2022-01-01 RX ORDER — CALCIUM ACETATE 667 MG/1
667 CAPSULE ORAL DAILY
Status: DISCONTINUED | OUTPATIENT
Start: 2022-01-01 | End: 2022-01-01 | Stop reason: HOSPADM

## 2022-01-01 RX ORDER — DIPHENHYDRAMINE HYDROCHLORIDE 50 MG/ML
6.25 INJECTION INTRAMUSCULAR; INTRAVENOUS ONCE
Status: COMPLETED | OUTPATIENT
Start: 2022-01-01 | End: 2022-01-01

## 2022-01-01 RX ORDER — ACETAMINOPHEN 500 MG
1000 TABLET ORAL
Status: COMPLETED | OUTPATIENT
Start: 2022-01-01 | End: 2022-01-01

## 2022-01-01 RX ORDER — PANTOPRAZOLE SODIUM 40 MG/1
40 TABLET, DELAYED RELEASE ORAL
Status: DISCONTINUED | OUTPATIENT
Start: 2022-01-01 | End: 2022-01-01 | Stop reason: HOSPADM

## 2022-01-01 RX ORDER — ONDANSETRON 4 MG/1
8 TABLET, ORALLY DISINTEGRATING ORAL EVERY 8 HOURS PRN
Status: DISCONTINUED | OUTPATIENT
Start: 2022-01-01 | End: 2022-01-01 | Stop reason: HOSPADM

## 2022-01-01 RX ORDER — HYDROCODONE BITARTRATE AND ACETAMINOPHEN 5; 325 MG/1; MG/1
1 TABLET ORAL EVERY 6 HOURS PRN
Status: DISCONTINUED | OUTPATIENT
Start: 2022-01-01 | End: 2022-01-01 | Stop reason: HOSPADM

## 2022-01-01 RX ORDER — BUTYROSPERMUM PARKII(SHEA BUTTER), SIMMONDSIA CHINENSIS (JOJOBA) SEED OIL, ALOE BARBADENSIS LEAF EXTRACT .01; 1; 3.5 G/100G; G/100G; G/100G
250 LIQUID TOPICAL
Status: DISCONTINUED | OUTPATIENT
Start: 2022-01-01 | End: 2022-01-01 | Stop reason: HOSPADM

## 2022-01-01 RX ORDER — LORAZEPAM 0.5 MG/1
0.5 TABLET ORAL EVERY 6 HOURS PRN
Qty: 30 TABLET | Refills: 0 | Status: SHIPPED | OUTPATIENT
Start: 2022-01-01 | End: 2022-01-01

## 2022-01-01 RX ORDER — VANCOMYCIN HYDROCHLORIDE 125 MG/1
125 CAPSULE ORAL EVERY 6 HOURS
Qty: 40 CAPSULE | Refills: 0
Start: 2022-01-01 | End: 2022-01-01

## 2022-01-01 RX ORDER — DEXTROSE 40 %
15 GEL (GRAM) ORAL
Status: CANCELLED | OUTPATIENT
Start: 2022-01-01

## 2022-01-01 RX ORDER — AMIODARONE HYDROCHLORIDE 200 MG/1
200 TABLET ORAL 3 TIMES DAILY
Qty: 21 TABLET | Refills: 0 | Status: SHIPPED | OUTPATIENT
Start: 2022-01-01 | End: 2022-05-08

## 2022-01-01 RX ORDER — ACETAMINOPHEN 325 MG/1
650 TABLET ORAL EVERY 6 HOURS PRN
Status: DISCONTINUED | OUTPATIENT
Start: 2022-01-01 | End: 2022-01-01 | Stop reason: HOSPADM

## 2022-01-01 RX ORDER — WARFARIN SODIUM 5 MG/1
5 TABLET ORAL ONCE
Status: DISCONTINUED | OUTPATIENT
Start: 2022-01-01 | End: 2022-01-01

## 2022-01-01 RX ORDER — IPRATROPIUM BROMIDE 0.5 MG/2.5ML
1 SOLUTION RESPIRATORY (INHALATION)
Status: COMPLETED | OUTPATIENT
Start: 2022-01-01 | End: 2022-01-01

## 2022-01-01 RX ORDER — WARFARIN 2.5 MG/1
2.5 TABLET ORAL DAILY
COMMUNITY

## 2022-01-01 RX ORDER — IPRATROPIUM BROMIDE 0.5 MG/2.5ML
0.5 SOLUTION RESPIRATORY (INHALATION) EVERY 8 HOURS
Status: DISCONTINUED | OUTPATIENT
Start: 2022-01-01 | End: 2022-01-01 | Stop reason: HOSPADM

## 2022-01-01 RX ORDER — METOPROLOL SUCCINATE 25 MG/1
25 TABLET, EXTENDED RELEASE ORAL NIGHTLY
Status: DISCONTINUED | OUTPATIENT
Start: 2022-01-01 | End: 2022-01-01

## 2022-01-01 RX ORDER — ONDANSETRON 4 MG/1
4 TABLET, ORALLY DISINTEGRATING ORAL EVERY 6 HOURS PRN
Status: DISCONTINUED | OUTPATIENT
Start: 2022-01-01 | End: 2022-01-01

## 2022-01-01 RX ORDER — SODIUM CHLORIDE 9 MG/ML
INJECTION, SOLUTION INTRAVENOUS
Status: COMPLETED | OUTPATIENT
Start: 2022-01-01 | End: 2022-01-01

## 2022-01-01 RX ORDER — IPRATROPIUM BROMIDE AND ALBUTEROL SULFATE 2.5; .5 MG/3ML; MG/3ML
3 SOLUTION RESPIRATORY (INHALATION) EVERY 6 HOURS PRN
Status: DISCONTINUED | OUTPATIENT
Start: 2022-01-01 | End: 2022-01-01 | Stop reason: HOSPADM

## 2022-01-01 RX ORDER — IPRATROPIUM BROMIDE 0.5 MG/2.5ML
0.5 SOLUTION RESPIRATORY (INHALATION) EVERY 8 HOURS PRN
Status: DISCONTINUED | OUTPATIENT
Start: 2022-01-01 | End: 2022-01-01 | Stop reason: HOSPADM

## 2022-01-01 RX ORDER — MUPIROCIN 20 MG/G
OINTMENT TOPICAL 3 TIMES DAILY
Qty: 2 G | Refills: 4 | Status: ON HOLD | OUTPATIENT
Start: 2022-01-01 | End: 2022-01-01 | Stop reason: HOSPADM

## 2022-01-01 RX ORDER — IBUPROFEN 200 MG
16 TABLET ORAL
Status: DISCONTINUED | OUTPATIENT
Start: 2022-01-01 | End: 2022-01-01 | Stop reason: HOSPADM

## 2022-01-01 RX ORDER — GLUCAGON 1 MG
1 KIT INJECTION
Status: DISCONTINUED | OUTPATIENT
Start: 2022-01-01 | End: 2022-01-01 | Stop reason: HOSPADM

## 2022-01-01 RX ORDER — HYDROMORPHONE HYDROCHLORIDE 1 MG/ML
0.5 INJECTION, SOLUTION INTRAMUSCULAR; INTRAVENOUS; SUBCUTANEOUS EVERY 4 HOURS PRN
Status: DISCONTINUED | OUTPATIENT
Start: 2022-01-01 | End: 2022-01-01

## 2022-01-01 RX ORDER — AMIODARONE HYDROCHLORIDE 200 MG/1
200 TABLET ORAL DAILY
Qty: 30 TABLET | Refills: 0 | Status: SHIPPED | OUTPATIENT
Start: 2022-05-09 | End: 2022-01-01 | Stop reason: SDUPTHER

## 2022-01-01 RX ORDER — BUTYROSPERMUM PARKII(SHEA BUTTER), SIMMONDSIA CHINENSIS (JOJOBA) SEED OIL, ALOE BARBADENSIS LEAF EXTRACT .01; 1; 3.5 G/100G; G/100G; G/100G
250 LIQUID TOPICAL
Qty: 30 CAPSULE | Refills: 0 | Status: SHIPPED | OUTPATIENT
Start: 2022-01-01 | End: 2022-01-01

## 2022-01-01 RX ORDER — CALCIUM CHLORIDE INJECTION 100 MG/ML
INJECTION, SOLUTION INTRAVENOUS CODE/TRAUMA/SEDATION MEDICATION
Status: COMPLETED | OUTPATIENT
Start: 2022-01-01 | End: 2022-01-01

## 2022-01-01 RX ORDER — BUTYROSPERMUM PARKII(SHEA BUTTER), SIMMONDSIA CHINENSIS (JOJOBA) SEED OIL, ALOE BARBADENSIS LEAF EXTRACT .01; 1; 3.5 G/100G; G/100G; G/100G
LIQUID TOPICAL
Status: ON HOLD | COMMUNITY
Start: 2022-01-01 | End: 2022-01-01 | Stop reason: HOSPADM

## 2022-01-01 RX ORDER — WARFARIN 2.5 MG/1
2.5 TABLET ORAL DAILY
Status: ON HOLD | COMMUNITY
Start: 2022-01-01 | End: 2022-01-01 | Stop reason: HOSPADM

## 2022-01-01 RX ORDER — DIGOXIN 0.25 MG/ML
125 INJECTION INTRAMUSCULAR; INTRAVENOUS ONCE
Status: COMPLETED | OUTPATIENT
Start: 2022-01-01 | End: 2022-01-01

## 2022-01-01 RX ORDER — CALCIUM ACETATE 667 MG/1
667 CAPSULE ORAL
COMMUNITY

## 2022-01-01 RX ORDER — HYDROMORPHONE HYDROCHLORIDE 1 MG/ML
0.5 INJECTION, SOLUTION INTRAMUSCULAR; INTRAVENOUS; SUBCUTANEOUS EVERY 4 HOURS PRN
Status: DISCONTINUED | OUTPATIENT
Start: 2022-01-01 | End: 2022-01-01 | Stop reason: HOSPADM

## 2022-01-01 RX ORDER — IBUPROFEN 200 MG
24 TABLET ORAL
Status: DISCONTINUED | OUTPATIENT
Start: 2022-01-01 | End: 2022-01-01 | Stop reason: HOSPADM

## 2022-01-01 RX ORDER — HYDROCODONE BITARTRATE AND ACETAMINOPHEN 5; 325 MG/1; MG/1
1 TABLET ORAL
Status: COMPLETED | OUTPATIENT
Start: 2022-01-01 | End: 2022-01-01

## 2022-01-01 RX ORDER — AMOXICILLIN 250 MG
2 CAPSULE ORAL 2 TIMES DAILY PRN
Status: DISCONTINUED | OUTPATIENT
Start: 2022-01-01 | End: 2022-01-01 | Stop reason: HOSPADM

## 2022-01-01 RX ORDER — IODIXANOL 320 MG/ML
100 INJECTION, SOLUTION INTRAVASCULAR
Status: COMPLETED | OUTPATIENT
Start: 2022-01-01 | End: 2022-01-01

## 2022-01-01 RX ORDER — CALCIUM ACETATE 667 MG/1
667 CAPSULE ORAL
Status: DISCONTINUED | OUTPATIENT
Start: 2022-01-01 | End: 2022-01-01 | Stop reason: HOSPADM

## 2022-01-01 RX ORDER — IPRATROPIUM BROMIDE AND ALBUTEROL SULFATE 2.5; .5 MG/3ML; MG/3ML
3 SOLUTION RESPIRATORY (INHALATION)
Status: COMPLETED | OUTPATIENT
Start: 2022-01-01 | End: 2022-01-01

## 2022-01-01 RX ORDER — HEPARIN SODIUM 10000 [USP'U]/ML
INJECTION, SOLUTION INTRAVENOUS; SUBCUTANEOUS
Status: ON HOLD | COMMUNITY
Start: 2022-01-01 | End: 2022-01-01 | Stop reason: HOSPADM

## 2022-01-01 RX ORDER — SODIUM BICARBONATE 1 MEQ/ML
SYRINGE (ML) INTRAVENOUS CODE/TRAUMA/SEDATION MEDICATION
Status: COMPLETED | OUTPATIENT
Start: 2022-01-01 | End: 2022-01-01

## 2022-01-01 RX ORDER — ALBUTEROL SULFATE 1.25 MG/3ML
1.25 SOLUTION RESPIRATORY (INHALATION) EVERY 6 HOURS PRN
COMMUNITY

## 2022-01-01 RX ORDER — DILTIAZEM HYDROCHLORIDE 30 MG/1
30 TABLET, FILM COATED ORAL
Status: COMPLETED | OUTPATIENT
Start: 2022-01-01 | End: 2022-01-01

## 2022-01-01 RX ORDER — FUROSEMIDE 10 MG/ML
20 INJECTION INTRAMUSCULAR; INTRAVENOUS ONCE
Status: DISCONTINUED | OUTPATIENT
Start: 2022-01-01 | End: 2022-01-01 | Stop reason: HOSPADM

## 2022-01-01 RX ORDER — LORAZEPAM 2 MG/ML
0.5 INJECTION INTRAMUSCULAR
Status: COMPLETED | OUTPATIENT
Start: 2022-01-01 | End: 2022-01-01

## 2022-01-01 RX ORDER — LEVOFLOXACIN 5 MG/ML
750 INJECTION, SOLUTION INTRAVENOUS
Status: DISCONTINUED | OUTPATIENT
Start: 2022-01-01 | End: 2022-01-01

## 2022-01-01 RX ORDER — FAMOTIDINE 20 MG/1
20 TABLET, FILM COATED ORAL DAILY
Status: DISCONTINUED | OUTPATIENT
Start: 2022-01-01 | End: 2022-01-01

## 2022-01-01 RX ORDER — PANTOPRAZOLE SODIUM 40 MG/10ML
40 INJECTION, POWDER, LYOPHILIZED, FOR SOLUTION INTRAVENOUS 2 TIMES DAILY
Status: DISCONTINUED | OUTPATIENT
Start: 2022-01-01 | End: 2022-01-01 | Stop reason: HOSPADM

## 2022-01-01 RX ADMIN — WARFARIN SODIUM 5 MG: 2.5 TABLET ORAL at 04:03

## 2022-01-01 RX ADMIN — CYANOCOBALAMIN TAB 1000 MCG 1000 MCG: 1000 TAB at 08:04

## 2022-01-01 RX ADMIN — DILTIAZEM HYDROCHLORIDE 30 MG: 30 TABLET, FILM COATED ORAL at 12:04

## 2022-01-01 RX ADMIN — LEVALBUTEROL HYDROCHLORIDE 0.63 MG: 0.63 SOLUTION RESPIRATORY (INHALATION) at 04:05

## 2022-01-01 RX ADMIN — Medication 125 MG: at 04:04

## 2022-01-01 RX ADMIN — LEVALBUTEROL HYDROCHLORIDE 1.25 MG: 1.25 SOLUTION RESPIRATORY (INHALATION) at 07:04

## 2022-01-01 RX ADMIN — METOPROLOL SUCCINATE 25 MG: 25 TABLET, EXTENDED RELEASE ORAL at 12:01

## 2022-01-01 RX ADMIN — CALCITRIOL CAPSULES 0.25 MCG 0.5 MCG: 0.25 CAPSULE ORAL at 05:04

## 2022-01-01 RX ADMIN — DILTIAZEM HYDROCHLORIDE 30 MG: 30 TABLET, FILM COATED ORAL at 10:01

## 2022-01-01 RX ADMIN — ISOSORBIDE MONONITRATE 30 MG: 30 TABLET, EXTENDED RELEASE ORAL at 09:04

## 2022-01-01 RX ADMIN — PIPERACILLIN SODIUM,TAZOBACTAM SODIUM 3.38 G: 3; .375 INJECTION, POWDER, FOR SOLUTION INTRAVENOUS at 01:04

## 2022-01-01 RX ADMIN — CALCIUM CHLORIDE 1 G: 100 INJECTION, SOLUTION INTRAVENOUS at 10:05

## 2022-01-01 RX ADMIN — DEXTROSE 125 ML: 10 SOLUTION INTRAVENOUS at 03:04

## 2022-01-01 RX ADMIN — Medication 6 MG: at 10:04

## 2022-01-01 RX ADMIN — MORPHINE SULFATE 2 MG: 2 INJECTION, SOLUTION INTRAMUSCULAR; INTRAVENOUS at 11:03

## 2022-01-01 RX ADMIN — METOPROLOL SUCCINATE 25 MG: 25 TABLET, EXTENDED RELEASE ORAL at 09:01

## 2022-01-01 RX ADMIN — LORAZEPAM 0.5 MG: 0.5 TABLET ORAL at 05:04

## 2022-01-01 RX ADMIN — OXYCODONE AND ACETAMINOPHEN 1 TABLET: 5; 325 TABLET ORAL at 05:01

## 2022-01-01 RX ADMIN — WARFARIN SODIUM 2.5 MG: 2.5 TABLET ORAL at 08:03

## 2022-01-01 RX ADMIN — EPINEPHRINE 1 MG: 0.1 INJECTION, SOLUTION ENDOTRACHEAL; INTRACARDIAC; INTRAVENOUS at 10:05

## 2022-01-01 RX ADMIN — LEVALBUTEROL HYDROCHLORIDE 0.63 MG: 0.63 SOLUTION RESPIRATORY (INHALATION) at 08:05

## 2022-01-01 RX ADMIN — DILTIAZEM HYDROCHLORIDE 30 MG: 30 TABLET, FILM COATED ORAL at 11:04

## 2022-01-01 RX ADMIN — Medication 16 G: at 06:01

## 2022-01-01 RX ADMIN — PREDNISONE 20 MG: 20 TABLET ORAL at 09:04

## 2022-01-01 RX ADMIN — PANTOPRAZOLE SODIUM 40 MG: 40 TABLET, DELAYED RELEASE ORAL at 06:05

## 2022-01-01 RX ADMIN — MIDODRINE HYDROCHLORIDE 2.5 MG: 2.5 TABLET ORAL at 08:04

## 2022-01-01 RX ADMIN — PANTOPRAZOLE SODIUM 40 MG: 40 INJECTION, POWDER, FOR SOLUTION INTRAVENOUS at 08:04

## 2022-01-01 RX ADMIN — OXYCODONE AND ACETAMINOPHEN 1 TABLET: 5; 325 TABLET ORAL at 08:01

## 2022-01-01 RX ADMIN — CALCIUM ACETATE 667 MG: 667 CAPSULE ORAL at 08:05

## 2022-01-01 RX ADMIN — CHLORHEXIDINE GLUCONATE 15 ML: 1.2 RINSE ORAL at 08:04

## 2022-01-01 RX ADMIN — IPRATROPIUM BROMIDE 0.5 MG: 0.5 SOLUTION RESPIRATORY (INHALATION) at 08:05

## 2022-01-01 RX ADMIN — AMIODARONE HYDROCHLORIDE 200 MG: 200 TABLET ORAL at 08:04

## 2022-01-01 RX ADMIN — LEVALBUTEROL HYDROCHLORIDE 0.63 MG: 0.63 SOLUTION RESPIRATORY (INHALATION) at 05:03

## 2022-01-01 RX ADMIN — IPRATROPIUM BROMIDE 0.5 MG: 0.5 SOLUTION RESPIRATORY (INHALATION) at 08:04

## 2022-01-01 RX ADMIN — DILTIAZEM HYDROCHLORIDE 30 MG: 30 TABLET, FILM COATED ORAL at 12:01

## 2022-01-01 RX ADMIN — AMIODARONE HYDROCHLORIDE 200 MG: 200 TABLET ORAL at 01:04

## 2022-01-01 RX ADMIN — CALCIUM ACETATE 667 MG: 667 CAPSULE ORAL at 09:03

## 2022-01-01 RX ADMIN — DIPHENHYDRAMINE HYDROCHLORIDE 6.25 MG: 50 INJECTION INTRAMUSCULAR; INTRAVENOUS at 08:01

## 2022-01-01 RX ADMIN — ACETAMINOPHEN 650 MG: 325 TABLET, FILM COATED ORAL at 09:04

## 2022-01-01 RX ADMIN — ASPIRIN 81 MG: 81 TABLET, DELAYED RELEASE ORAL at 10:04

## 2022-01-01 RX ADMIN — DILTIAZEM HYDROCHLORIDE 30 MG: 30 TABLET, FILM COATED ORAL at 07:04

## 2022-01-01 RX ADMIN — METOPROLOL SUCCINATE 25 MG: 25 TABLET, EXTENDED RELEASE ORAL at 09:03

## 2022-01-01 RX ADMIN — DILTIAZEM HYDROCHLORIDE 30 MG: 30 TABLET, FILM COATED ORAL at 02:04

## 2022-01-01 RX ADMIN — PANTOPRAZOLE SODIUM 40 MG: 40 TABLET, DELAYED RELEASE ORAL at 05:03

## 2022-01-01 RX ADMIN — OXYCODONE AND ACETAMINOPHEN 1 TABLET: 5; 325 TABLET ORAL at 04:01

## 2022-01-01 RX ADMIN — WARFARIN SODIUM 2.5 MG: 2.5 TABLET ORAL at 10:03

## 2022-01-01 RX ADMIN — IODIXANOL 100 ML: 320 INJECTION, SOLUTION INTRAVASCULAR at 12:04

## 2022-01-01 RX ADMIN — HYDROMORPHONE HYDROCHLORIDE 0.5 MG: 1 INJECTION, SOLUTION INTRAMUSCULAR; INTRAVENOUS; SUBCUTANEOUS at 10:01

## 2022-01-01 RX ADMIN — IPRATROPIUM BROMIDE AND ALBUTEROL SULFATE 3 ML: .5; 3 SOLUTION RESPIRATORY (INHALATION) at 12:04

## 2022-01-01 RX ADMIN — DEXTROSE MONOHYDRATE 25 G: 25 INJECTION, SOLUTION INTRAVENOUS at 06:01

## 2022-01-01 RX ADMIN — LACTOBACILLUS TAB 1 TABLET: TAB at 05:04

## 2022-01-01 RX ADMIN — IPRATROPIUM BROMIDE AND ALBUTEROL SULFATE 3 ML: .5; 3 SOLUTION RESPIRATORY (INHALATION) at 12:03

## 2022-01-01 RX ADMIN — DEXTROSE 125 ML: 10 SOLUTION INTRAVENOUS at 07:04

## 2022-01-01 RX ADMIN — ASPIRIN 81 MG: 81 TABLET, DELAYED RELEASE ORAL at 09:04

## 2022-01-01 RX ADMIN — METOPROLOL TARTRATE 2.5 MG: 5 INJECTION INTRAVENOUS at 02:04

## 2022-01-01 RX ADMIN — LOSARTAN POTASSIUM 25 MG: 25 TABLET, FILM COATED ORAL at 09:01

## 2022-01-01 RX ADMIN — ISOSORBIDE MONONITRATE 30 MG: 30 TABLET, EXTENDED RELEASE ORAL at 09:03

## 2022-01-01 RX ADMIN — CALCIUM ACETATE 667 MG: 667 CAPSULE ORAL at 09:01

## 2022-01-01 RX ADMIN — LORAZEPAM 0.5 MG: 0.5 TABLET ORAL at 01:04

## 2022-01-01 RX ADMIN — ASPIRIN 81 MG: 81 TABLET, DELAYED RELEASE ORAL at 08:01

## 2022-01-01 RX ADMIN — DILTIAZEM HYDROCHLORIDE 30 MG: 30 TABLET, FILM COATED ORAL at 05:03

## 2022-01-01 RX ADMIN — ASPIRIN 81 MG: 81 TABLET, DELAYED RELEASE ORAL at 09:01

## 2022-01-01 RX ADMIN — DIGOXIN 125 MCG: 0.25 INJECTION INTRAMUSCULAR; INTRAVENOUS at 01:04

## 2022-01-01 RX ADMIN — Medication 6 MG: at 04:04

## 2022-01-01 RX ADMIN — OXYCODONE AND ACETAMINOPHEN 1 TABLET: 5; 325 TABLET ORAL at 09:01

## 2022-01-01 RX ADMIN — CALCIUM ACETATE 667 MG: 667 CAPSULE ORAL at 01:04

## 2022-01-01 RX ADMIN — ISOSORBIDE MONONITRATE 30 MG: 30 TABLET, EXTENDED RELEASE ORAL at 08:01

## 2022-01-01 RX ADMIN — SODIUM CHLORIDE: 0.9 INJECTION, SOLUTION INTRAVENOUS at 01:01

## 2022-01-01 RX ADMIN — DILTIAZEM HYDROCHLORIDE 30 MG: 30 TABLET, FILM COATED ORAL at 09:01

## 2022-01-01 RX ADMIN — DILTIAZEM HYDROCHLORIDE 30 MG: 30 TABLET, FILM COATED ORAL at 09:03

## 2022-01-01 RX ADMIN — LEVALBUTEROL HYDROCHLORIDE 0.63 MG: 0.63 SOLUTION RESPIRATORY (INHALATION) at 09:03

## 2022-01-01 RX ADMIN — PANTOPRAZOLE SODIUM 40 MG: 40 INJECTION, POWDER, FOR SOLUTION INTRAVENOUS at 09:04

## 2022-01-01 RX ADMIN — IPRATROPIUM BROMIDE 1 MG: 0.5 SOLUTION RESPIRATORY (INHALATION) at 07:04

## 2022-01-01 RX ADMIN — HYDROMORPHONE HYDROCHLORIDE 0.5 MG: 1 INJECTION, SOLUTION INTRAMUSCULAR; INTRAVENOUS; SUBCUTANEOUS at 06:01

## 2022-01-01 RX ADMIN — DILTIAZEM HYDROCHLORIDE 30 MG: 30 TABLET, FILM COATED ORAL at 09:04

## 2022-01-01 RX ADMIN — AMIODARONE HYDROCHLORIDE 0.5 MG/MIN: 1.8 INJECTION, SOLUTION INTRAVENOUS at 02:03

## 2022-01-01 RX ADMIN — DILTIAZEM HYDROCHLORIDE 30 MG: 30 TABLET, FILM COATED ORAL at 06:01

## 2022-01-01 RX ADMIN — LOSARTAN POTASSIUM 25 MG: 25 TABLET, FILM COATED ORAL at 10:03

## 2022-01-01 RX ADMIN — LEVALBUTEROL HYDROCHLORIDE 0.63 MG: 0.63 SOLUTION RESPIRATORY (INHALATION) at 08:03

## 2022-01-01 RX ADMIN — LACTOBACILLUS TAB 1 TABLET: TAB at 08:05

## 2022-01-01 RX ADMIN — DEXTROSE MONOHYDRATE 25 G: 25 INJECTION, SOLUTION INTRAVENOUS at 05:01

## 2022-01-01 RX ADMIN — IPRATROPIUM BROMIDE 0.5 MG: 0.5 SOLUTION RESPIRATORY (INHALATION) at 11:04

## 2022-01-01 RX ADMIN — PANTOPRAZOLE SODIUM 40 MG: 40 TABLET, DELAYED RELEASE ORAL at 05:01

## 2022-01-01 RX ADMIN — LEVALBUTEROL HYDROCHLORIDE 0.63 MG: 0.63 SOLUTION RESPIRATORY (INHALATION) at 08:04

## 2022-01-01 RX ADMIN — ONDANSETRON 4 MG: 2 INJECTION INTRAMUSCULAR; INTRAVENOUS at 04:01

## 2022-01-01 RX ADMIN — MORPHINE SULFATE 2 MG: 2 INJECTION, SOLUTION INTRAMUSCULAR; INTRAVENOUS at 01:01

## 2022-01-01 RX ADMIN — AMIODARONE HYDROCHLORIDE 0.5 MG/MIN: 1.8 INJECTION, SOLUTION INTRAVENOUS at 01:03

## 2022-01-01 RX ADMIN — Medication 125 MG: at 06:04

## 2022-01-01 RX ADMIN — LOSARTAN POTASSIUM 25 MG: 25 TABLET, FILM COATED ORAL at 12:01

## 2022-01-01 RX ADMIN — CYANOCOBALAMIN TAB 1000 MCG 1000 MCG: 1000 TAB at 10:04

## 2022-01-01 RX ADMIN — OXYCODONE HYDROCHLORIDE AND ACETAMINOPHEN 1 TABLET: 5; 325 TABLET ORAL at 10:04

## 2022-01-01 RX ADMIN — METOPROLOL SUCCINATE 25 MG: 25 TABLET, EXTENDED RELEASE ORAL at 10:04

## 2022-01-01 RX ADMIN — ONDANSETRON 4 MG: 2 INJECTION INTRAMUSCULAR; INTRAVENOUS at 09:04

## 2022-01-01 RX ADMIN — GABAPENTIN 200 MG: 100 CAPSULE ORAL at 05:03

## 2022-01-01 RX ADMIN — Medication 125 MG: at 12:04

## 2022-01-01 RX ADMIN — MUPIROCIN: 20 OINTMENT TOPICAL at 08:04

## 2022-01-01 RX ADMIN — MUPIROCIN: 20 OINTMENT TOPICAL at 09:03

## 2022-01-01 RX ADMIN — LEVALBUTEROL HYDROCHLORIDE 0.63 MG: 0.63 SOLUTION RESPIRATORY (INHALATION) at 02:03

## 2022-01-01 RX ADMIN — DILTIAZEM HYDROCHLORIDE 30 MG: 30 TABLET, FILM COATED ORAL at 05:04

## 2022-01-01 RX ADMIN — CALCIUM ACETATE 667 MG: 667 CAPSULE ORAL at 12:04

## 2022-01-01 RX ADMIN — CALCIUM ACETATE 667 MG: 667 CAPSULE ORAL at 07:04

## 2022-01-01 RX ADMIN — LEVALBUTEROL HYDROCHLORIDE 1.25 MG: 1.25 SOLUTION RESPIRATORY (INHALATION) at 11:04

## 2022-01-01 RX ADMIN — LORAZEPAM 0.5 MG: 0.5 TABLET ORAL at 03:04

## 2022-01-01 RX ADMIN — LOSARTAN POTASSIUM 25 MG: 25 TABLET, FILM COATED ORAL at 09:03

## 2022-01-01 RX ADMIN — DILTIAZEM HYDROCHLORIDE 30 MG: 30 TABLET, FILM COATED ORAL at 06:04

## 2022-01-01 RX ADMIN — CALCIUM ACETATE 667 MG: 667 CAPSULE ORAL at 09:04

## 2022-01-01 RX ADMIN — WARFARIN SODIUM 5 MG: 5 TABLET ORAL at 05:01

## 2022-01-01 RX ADMIN — AMIODARONE HYDROCHLORIDE 200 MG: 200 TABLET ORAL at 08:05

## 2022-01-01 RX ADMIN — MIDODRINE HYDROCHLORIDE 2.5 MG: 2.5 TABLET ORAL at 03:04

## 2022-01-01 RX ADMIN — PANTOPRAZOLE SODIUM 40 MG: 40 TABLET, DELAYED RELEASE ORAL at 08:01

## 2022-01-01 RX ADMIN — Medication 6 MG: at 08:04

## 2022-01-01 RX ADMIN — IPRATROPIUM BROMIDE 0.5 MG: 0.5 SOLUTION RESPIRATORY (INHALATION) at 09:03

## 2022-01-01 RX ADMIN — LEVALBUTEROL HYDROCHLORIDE 0.63 MG: 0.63 SOLUTION RESPIRATORY (INHALATION) at 11:04

## 2022-01-01 RX ADMIN — ONDANSETRON 4 MG: 2 INJECTION INTRAMUSCULAR; INTRAVENOUS at 01:01

## 2022-01-01 RX ADMIN — IPRATROPIUM BROMIDE 0.5 MG: 0.5 SOLUTION RESPIRATORY (INHALATION) at 03:04

## 2022-01-01 RX ADMIN — CALCIUM ACETATE 667 MG: 667 CAPSULE ORAL at 04:04

## 2022-01-01 RX ADMIN — ISOSORBIDE MONONITRATE 30 MG: 30 TABLET, EXTENDED RELEASE ORAL at 08:05

## 2022-01-01 RX ADMIN — CALCIUM ACETATE 667 MG: 667 CAPSULE ORAL at 10:04

## 2022-01-01 RX ADMIN — FUROSEMIDE 40 MG: 10 INJECTION, SOLUTION INTRAMUSCULAR; INTRAVENOUS at 02:03

## 2022-01-01 RX ADMIN — LOSARTAN POTASSIUM 25 MG: 25 TABLET, FILM COATED ORAL at 10:01

## 2022-01-01 RX ADMIN — DILTIAZEM HYDROCHLORIDE 5 MG/HR: 100 INJECTION, POWDER, LYOPHILIZED, FOR SOLUTION INTRAVENOUS at 03:03

## 2022-01-01 RX ADMIN — LEVALBUTEROL HYDROCHLORIDE 0.63 MG: 0.63 SOLUTION RESPIRATORY (INHALATION) at 03:04

## 2022-01-01 RX ADMIN — WARFARIN SODIUM 3 MG: 1 TABLET ORAL at 03:05

## 2022-01-01 RX ADMIN — DEXTROSE, SODIUM CHLORIDE, SODIUM LACTATE, POTASSIUM CHLORIDE, AND CALCIUM CHLORIDE: 5; .6; .31; .03; .02 INJECTION, SOLUTION INTRAVENOUS at 06:01

## 2022-01-01 RX ADMIN — GABAPENTIN 300 MG: 300 CAPSULE ORAL at 03:03

## 2022-01-01 RX ADMIN — DEXTROSE 125 ML: 10 SOLUTION INTRAVENOUS at 12:04

## 2022-01-01 RX ADMIN — MAGNESIUM SULFATE IN DEXTROSE 1 G: 10 INJECTION, SOLUTION INTRAVENOUS at 10:04

## 2022-01-01 RX ADMIN — MUPIROCIN: 20 OINTMENT TOPICAL at 08:05

## 2022-01-01 RX ADMIN — DILTIAZEM HYDROCHLORIDE 30 MG: 30 TABLET, FILM COATED ORAL at 04:03

## 2022-01-01 RX ADMIN — DILTIAZEM HYDROCHLORIDE 30 MG: 30 TABLET, FILM COATED ORAL at 05:01

## 2022-01-01 RX ADMIN — ONDANSETRON 4 MG: 2 INJECTION INTRAMUSCULAR; INTRAVENOUS at 04:04

## 2022-01-01 RX ADMIN — DEXTROSE 125 ML: 10 SOLUTION INTRAVENOUS at 06:04

## 2022-01-01 RX ADMIN — CYANOCOBALAMIN TAB 1000 MCG 1000 MCG: 1000 TAB at 09:04

## 2022-01-01 RX ADMIN — METOPROLOL SUCCINATE 25 MG: 25 TABLET, EXTENDED RELEASE ORAL at 10:01

## 2022-01-01 RX ADMIN — LORAZEPAM 0.5 MG: 0.5 TABLET ORAL at 06:05

## 2022-01-01 RX ADMIN — IPRATROPIUM BROMIDE AND ALBUTEROL SULFATE 3 ML: .5; 3 SOLUTION RESPIRATORY (INHALATION) at 07:04

## 2022-01-01 RX ADMIN — PANTOPRAZOLE SODIUM 40 MG: 40 TABLET, DELAYED RELEASE ORAL at 05:04

## 2022-01-01 RX ADMIN — LORAZEPAM 0.5 MG: 0.5 TABLET ORAL at 08:04

## 2022-01-01 RX ADMIN — ACETAMINOPHEN 1000 MG: 500 TABLET, FILM COATED ORAL at 01:01

## 2022-01-01 RX ADMIN — IPRATROPIUM BROMIDE 0.5 MG: 0.5 SOLUTION RESPIRATORY (INHALATION) at 04:05

## 2022-01-01 RX ADMIN — LACTOBACILLUS TAB 1 TABLET: TAB at 07:04

## 2022-01-01 RX ADMIN — Medication 250 MG: at 05:04

## 2022-01-01 RX ADMIN — Medication 250 MG: at 11:04

## 2022-01-01 RX ADMIN — DEXTROSE MONOHYDRATE 25 G: 25 INJECTION, SOLUTION INTRAVENOUS at 10:04

## 2022-01-01 RX ADMIN — AMIODARONE HYDROCHLORIDE 150 MG: 1.5 INJECTION, SOLUTION INTRAVENOUS at 05:03

## 2022-01-01 RX ADMIN — AMIODARONE HYDROCHLORIDE 1 MG/MIN: 1.8 INJECTION, SOLUTION INTRAVENOUS at 06:03

## 2022-01-01 RX ADMIN — AMIODARONE HYDROCHLORIDE 200 MG: 200 TABLET ORAL at 03:05

## 2022-01-01 RX ADMIN — AMIODARONE HYDROCHLORIDE 200 MG: 200 TABLET ORAL at 05:04

## 2022-01-01 RX ADMIN — PREDNISONE 20 MG: 20 TABLET ORAL at 08:05

## 2022-01-01 RX ADMIN — LORAZEPAM 0.5 MG: 2 INJECTION INTRAMUSCULAR; INTRAVENOUS at 02:03

## 2022-01-01 RX ADMIN — DILTIAZEM HYDROCHLORIDE 30 MG: 30 TABLET, FILM COATED ORAL at 04:04

## 2022-01-01 RX ADMIN — IPRATROPIUM BROMIDE AND ALBUTEROL SULFATE 3 ML: .5; 3 SOLUTION RESPIRATORY (INHALATION) at 02:04

## 2022-01-01 RX ADMIN — CYANOCOBALAMIN TAB 1000 MCG 1000 MCG: 1000 TAB at 08:05

## 2022-01-01 RX ADMIN — IPRATROPIUM BROMIDE AND ALBUTEROL SULFATE 3 ML: .5; 3 SOLUTION RESPIRATORY (INHALATION) at 08:04

## 2022-01-01 RX ADMIN — ACETAMINOPHEN 650 MG: 325 TABLET, FILM COATED ORAL at 08:05

## 2022-01-01 RX ADMIN — MUPIROCIN: 20 OINTMENT TOPICAL at 09:04

## 2022-01-01 RX ADMIN — ISOSORBIDE MONONITRATE 30 MG: 30 TABLET, EXTENDED RELEASE ORAL at 10:04

## 2022-01-01 RX ADMIN — PANTOPRAZOLE SODIUM 40 MG: 40 TABLET, DELAYED RELEASE ORAL at 06:01

## 2022-01-01 RX ADMIN — WARFARIN SODIUM 2 MG: 1 TABLET ORAL at 03:05

## 2022-01-01 RX ADMIN — PIPERACILLIN AND TAZOBACTAM 4.5 G: 4; .5 INJECTION, POWDER, LYOPHILIZED, FOR SOLUTION INTRAVENOUS; PARENTERAL at 01:04

## 2022-01-01 RX ADMIN — WARFARIN SODIUM 5 MG: 5 TABLET ORAL at 06:01

## 2022-01-01 RX ADMIN — SODIUM CHLORIDE, SODIUM LACTATE, POTASSIUM CHLORIDE, AND CALCIUM CHLORIDE: .6; .31; .03; .02 INJECTION, SOLUTION INTRAVENOUS at 05:01

## 2022-01-01 RX ADMIN — MIDODRINE HYDROCHLORIDE 2.5 MG: 2.5 TABLET ORAL at 09:04

## 2022-01-01 RX ADMIN — DEXTROSE, SODIUM CHLORIDE, SODIUM LACTATE, POTASSIUM CHLORIDE, AND CALCIUM CHLORIDE: 5; .6; .31; .03; .02 INJECTION, SOLUTION INTRAVENOUS at 03:01

## 2022-01-01 RX ADMIN — CALCIUM ACETATE 667 MG: 667 CAPSULE ORAL at 05:04

## 2022-01-01 RX ADMIN — HYDROCODONE BITARTRATE AND ACETAMINOPHEN 1 TABLET: 5; 325 TABLET ORAL at 03:04

## 2022-01-01 RX ADMIN — HYDROMORPHONE HYDROCHLORIDE 0.5 MG: 1 INJECTION, SOLUTION INTRAMUSCULAR; INTRAVENOUS; SUBCUTANEOUS at 11:01

## 2022-01-01 RX ADMIN — SODIUM CHLORIDE 250 ML: 0.9 INJECTION, SOLUTION INTRAVENOUS at 04:05

## 2022-01-01 RX ADMIN — HYDROCODONE BITARTRATE AND ACETAMINOPHEN 1 TABLET: 5; 325 TABLET ORAL at 04:05

## 2022-01-01 RX ADMIN — WARFARIN SODIUM 3 MG: 1 TABLET ORAL at 05:04

## 2022-01-01 RX ADMIN — CALCIUM ACETATE 667 MG: 667 CAPSULE ORAL at 08:01

## 2022-01-01 RX ADMIN — OXYCODONE AND ACETAMINOPHEN 1 TABLET: 5; 325 TABLET ORAL at 12:01

## 2022-01-01 RX ADMIN — LACTOBACILLUS TAB 1 TABLET: TAB at 01:04

## 2022-01-01 RX ADMIN — ASPIRIN 81 MG: 81 TABLET, DELAYED RELEASE ORAL at 09:03

## 2022-01-01 RX ADMIN — ONDANSETRON 4 MG: 2 INJECTION INTRAMUSCULAR; INTRAVENOUS at 11:03

## 2022-01-01 RX ADMIN — ASPIRIN 81 MG: 81 TABLET, DELAYED RELEASE ORAL at 08:05

## 2022-01-01 RX ADMIN — SODIUM CHLORIDE 1000 ML: 9 INJECTION, SOLUTION INTRAVENOUS at 10:05

## 2022-01-01 RX ADMIN — FAMOTIDINE 20 MG: 20 TABLET ORAL at 09:04

## 2022-01-01 RX ADMIN — Medication 125 MG: at 05:04

## 2022-01-01 RX ADMIN — DILTIAZEM HYDROCHLORIDE 30 MG: 30 TABLET, FILM COATED ORAL at 08:04

## 2022-01-01 RX ADMIN — SODIUM BICARBONATE 50 MEQ: 84 INJECTION, SOLUTION INTRAVENOUS at 10:05

## 2022-01-01 RX ADMIN — METOPROLOL SUCCINATE 25 MG: 25 TABLET, EXTENDED RELEASE ORAL at 08:03

## 2022-01-01 RX ADMIN — DEXTROSE MONOHYDRATE 25 G: 25 INJECTION, SOLUTION INTRAVENOUS at 08:01

## 2022-01-13 NOTE — PROGRESS NOTES
- Continue home Synthroid   Consent verified, removed 2950 uf net, removed needles x2, access site bled > 10 min, pt stable

## 2022-01-24 PROBLEM — Z79.01 LONG TERM CURRENT USE OF ANTICOAGULANT: Chronic | Status: ACTIVE | Noted: 2020-09-05

## 2022-01-24 PROBLEM — Z87.19 HISTORY OF PANCREATITIS: Chronic | Status: ACTIVE | Noted: 2022-01-01

## 2022-01-24 NOTE — H&P
[facility] Medicine  History & Physical    Patient Name: Griselda Neely  MRN: [id number]  Patient Class: IP- Inpatient  Admission Date: 1/24/2022  Attending Physician: Jhoan Fuentes MD   Primary Care Provider: Kalie Neely MD         Patient information was obtained from patient, past medical records and ER records.     Subjective:     Principal Problem:Acute pancreatitis    Chief Complaint:   Chief Complaint   Patient presents with    Abdominal Pain     X 6 days. Recent ed visit for same complaint. Pt reports pain is no better than her last visit        HPI: HPI per patient and per patient's family is at bedside  76-year-old female with COPD on 4 L, current tobacco smoker, history of pancreatitis, atrial fibrillation on warfarin, mitral replacement,  ESRD (MWF), hypertension, hyperlipidemia, pulmonary hypertension, PVD, diverticulosis, DNR comes in for epigastric pain.  Patient was seen in the ED yesterday and was diagnosed with acute pancreatitis and was recommended to be admitted however patient refused.  Patient reports that she developed epigastric pain about 6 days ago.  Describes pain as sharp nonradiating severe pain.  Reported that she had decreased appetite.  States that she was unable to eat since about 3 days ago.  Denies fever, chills, chest pain, shortness of breath.  Patient reported the pain was persistent today and cannot tolerate anymore and came to the ED for further evaluation.      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    both legs    CHF (congestive heart failure)     Encounter for blood transfusion 03/2016    Gout     Hemodialysis access, AV graft     Hypertension     Mitral valve regurgitation     Osteoarthritis     Pancreatitis     Peripheral vascular disease     Pneumonia 09/09/2017    Renal failure        Past Surgical History:   Procedure Laterality Date    ACHILLES TENDON  SURGERY Right     ANGIOGRAPHY OF ARTERIOVENOUS SHUNT Right 1/7/2020    Procedure: Fistulogram with Possible Intervention;  Surgeon: Joseph Loyola MD;  Location: [facility] CATH/EP LAB;  Service: Cardiology;  Laterality: Right;    ANGIOGRAPHY OF LOWER EXTREMITY Left 3/18/2020    Procedure: Angiogram Extremity Unilateral;  Surgeon: Ali Khoobehi, MD;  Location: [facility] CATH/EP LAB;  Service: Cardiology;  Laterality: Left;    APPENDECTOMY      CARDIAC CATHETERIZATION  07/03/2017    CHOLECYSTECTOMY      COLONOSCOPY Left 10/4/2020    Procedure: COLONOSCOPY;  Surgeon: Jordan Kilpatrick MD;  Location: [facility] ENDO;  Service: Endoscopy;  Laterality: Left;    ESOPHAGOGASTRODUODENOSCOPY Left 8/17/2020    Procedure: EGD (ESOPHAGOGASTRODUODENOSCOPY);  Surgeon: Talat Trevizo MD;  Location: [facility] ENDO;  Service: Endoscopy;  Laterality: Left;    ESOPHAGOGASTRODUODENOSCOPY Left 10/2/2020    Procedure: EGD (ESOPHAGOGASTRODUODENOSCOPY);  Surgeon: Talat Trevizo MD;  Location: [facility] ENDO;  Service: Endoscopy;  Laterality: Left;    ESOPHAGOGASTRODUODENOSCOPY N/A 6/29/2021    Procedure: EGD (ESOPHAGOGASTRODUODENOSCOPY);  Surgeon: Sudheer Brown III, MD;  Location: [facility];  Service: Endoscopy;  Laterality: N/A;    heal surgery Right     HYSTERECTOMY      INSERTION OF STENT INTO PERIPHERAL VESSEL N/A 1/7/2020    Procedure: INSERTION, STENT, VESSEL, PERIPHERAL;  Surgeon: Joseph Loyola MD;  Location: [facility] CATH/EP LAB;  Service: Cardiology;  Laterality: N/A;    MITRAL VALVE REPLACEMENT      PARATHYROIDECTOMY      PARATHYROIDECTOMY  07/13/2017    PERCUTANEOUS TRANSLUMINAL ANGIOPLASTY OF ARTERIOVENOUS FISTULA N/A 1/7/2020    Procedure: PTA, AV FISTULA;  Surgeon: Joseph Loyola MD;  Location: [facility] CATH/EP LAB;  Service: Cardiology;  Laterality: N/A;    PERITONEAL CATHETER INSERTION      RENAL BIOPSY      SMALL BOWEL ENTEROSCOPY N/A 12/2/2020    Procedure: ENTEROSCOPY;  Surgeon: Sudheer Brown III, MD;  Location:  [facility] ENDO;  Service: Endoscopy;  Laterality: N/A;    vocal cord nodule      WOUND DEBRIDEMENT Left 7/13/2020    Procedure: DEBRIDEMENT, WOUND;  Surgeon: Carlos Elam III, MD;  Location: [facility] OR;  Service: General;  Laterality: Left;       Review of patient's allergies indicates:  No Known Allergies    No current facility-administered medications on file prior to encounter.     Current Outpatient Medications on File Prior to Encounter   Medication Sig    albuterol (ACCUNEB) 1.25 mg/3 mL Nebu Take 1.25 mg by nebulization every 6 (six) hours as needed. Rescue  HCS    albuterol (PROVENTIL/VENTOLIN HFA) 90 mcg/actuation inhaler Inhale 1 puff into the lungs every 4 (four) hours as needed for Wheezing. rescue    aspirin (ECOTRIN) 81 MG EC tablet Take 81 mg by mouth once daily.    calcium acetate (PHOSLO) 667 mg capsule Take 667 mg by mouth once daily.     diltiaZEM (CARDIZEM) 30 MG tablet Take 1 tablet (30 mg total) by mouth 4 (four) times daily before meals and nightly.    hydrALAZINE (APRESOLINE) 50 MG tablet Take 1 tablet (50 mg total) by mouth every 8 (eight) hours.    isosorbide mononitrate (IMDUR) 30 MG 24 hr tablet Take 1 tablet (30 mg total) by mouth once daily.    losartan (COZAAR) 25 MG tablet Take 25 mg by mouth every evening.    metoprolol succinate (TOPROL-XL) 25 MG 24 hr tablet Take 1 tablet (25 mg total) by mouth nightly.    ondansetron (ZOFRAN) 4 MG tablet Take 1 tablet (4 mg total) by mouth every 6 (six) hours as needed for Nausea.    oxyCODONE-acetaminophen (PERCOCET) 5-325 mg per tablet Take 1 tablet by mouth every 4 (four) hours as needed for Pain.    pantoprazole (PROTONIX) 40 MG tablet Take 40 mg by mouth once daily.    warfarin (COUMADIN) 5 MG tablet Take 1 tablet (5 mg total) by mouth Daily.    levalbuterol (XOPENEX) 0.63 mg/3 mL nebulizer solution Take 3 mLs (0.63 mg total) by nebulization every 8 (eight) hours as needed for Wheezing or Shortness of Breath. Rescue     Family  History     Problem Relation (Age of Onset)    Arthritis Mother    Diabetes Mother, Father    Early death Sister, Sister    Heart disease Sister, Maternal Grandfather, Mother    Heart failure Mother    Hypertension Mother        Tobacco Use    Smoking status: Current Some Day Smoker     Packs/day: 0.50     Years: 45.00     Pack years: 22.50     Types: Cigarettes    Smokeless tobacco: Never Used   Substance and Sexual Activity    Alcohol use: No    Drug use: No    Sexual activity: Not Currently     Review of Systems   Constitutional: Negative for chills, fatigue, fever and unexpected weight change.   HENT: Negative for ear pain, rhinorrhea, sneezing and sore throat.    Eyes: Negative for visual disturbance.   Respiratory: Negative for cough, chest tightness and shortness of breath.    Cardiovascular: Negative for chest pain.   Gastrointestinal: Negative for abdominal pain, constipation, diarrhea, nausea and vomiting.   Endocrine: Negative for polyuria.   Genitourinary: Negative for dysuria and hematuria.   Neurological: Negative for seizures and headaches.     Objective:     Vital Signs (Most Recent):  Temp: 97.6 °F (36.4 °C) (01/24/22 1201)  Pulse: (!) 141 (01/24/22 1400)  Resp: (!) 22 (01/24/22 1313)  BP: (!) 154/95 (01/24/22 1400)  SpO2: 100 % (01/24/22 1400) Vital Signs (24h Range):  Temp:  [97.6 °F (36.4 °C)] 97.6 °F (36.4 °C)  Pulse:  [134-144] 141  Resp:  [18-22] 22  SpO2:  [92 %-100 %] 100 %  BP: (139-195)/(95-99) 154/95     Weight: 55.8 kg (123 lb)  Body mass index is 20.47 kg/m².    Physical Exam  Vitals and nursing note reviewed.   Constitutional:       General: She is not in acute distress.     Appearance: Normal appearance. She is well-developed. She is ill-appearing (Chronically ill appearing). She is not toxic-appearing or diaphoretic.      Comments: Thin appearing   HENT:      Head: Normocephalic and atraumatic.      Right Ear: External ear normal.      Left Ear: External ear normal.      Nose:  Nose normal. No congestion.      Mouth/Throat:      Mouth: Mucous membranes are moist.      Pharynx: No oropharyngeal exudate or posterior oropharyngeal erythema.   Eyes:      General: No scleral icterus.        Right eye: No discharge.         Left eye: No discharge.      Extraocular Movements: Extraocular movements intact.      Conjunctiva/sclera: Conjunctivae normal.      Pupils: Pupils are equal, round, and reactive to light.   Neck:      Thyroid: No thyromegaly.   Cardiovascular:      Rate and Rhythm: Tachycardia present. Rhythm irregular.      Heart sounds: Normal heart sounds. No murmur heard.  No friction rub. No gallop.    Pulmonary:      Effort: Pulmonary effort is normal. No respiratory distress.      Breath sounds: Normal breath sounds. No wheezing or rales.   Chest:      Chest wall: No tenderness.   Abdominal:      General: Bowel sounds are normal. There is no distension.      Palpations: Abdomen is soft. There is no mass.      Tenderness: There is abdominal tenderness (Epigastric). There is no guarding or rebound.      Hernia: No hernia is present.   Musculoskeletal:         General: No tenderness. Normal range of motion.      Cervical back: Normal range of motion and neck supple.   Lymphadenopathy:      Cervical: No cervical adenopathy.   Skin:     General: Skin is warm.   Neurological:      Mental Status: She is alert and oriented to person, place, and time.   Psychiatric:         Mood and Affect: Mood normal.         Behavior: Behavior normal.         Thought Content: Thought content normal.         Judgment: Judgment normal.           CRANIAL NERVES     CN III, IV, VI   Pupils are equal, round, and reactive to light.       Significant Labs:   All pertinent labs within the past 24 hours have been reviewed.  CBC:   Recent Labs   Lab 01/23/22  0023 01/24/22  1329   WBC 4.01 5.01   HGB 12.0 13.0   HCT 38.0 39.9   * 108*     CMP:   Recent Labs   Lab 01/23/22  0023 01/24/22  1329   * 135*    K 3.4* 3.7   CL 93* 86*   CO2 25 27   GLU 84 52*   BUN 42* 26*   CREATININE 6.5* 4.9*   CALCIUM 7.5* 8.4*   PROT 7.5 7.7   ALBUMIN 3.3* 3.6   BILITOT 0.9 1.7*   ALKPHOS 75 86   AST 16 49*   ALT 9* 19   ANIONGAP 17* 22*   EGFRNONAA 5.7* 8.1*     Cardiac Markers:   Recent Labs   Lab 01/24/22  1329   BNP >4,500*  >4,500*     Lactic Acid:   Recent Labs   Lab 01/23/22  0023 01/24/22  1329   LACTATE 1.1 2.8*     Troponin:   Recent Labs   Lab 01/23/22  0023 01/24/22  1329   TROPONINI 0.042* 0.066*     EKG  My personal interpretation: No acute ST elevation or depression appreciated     Significant Imaging:     CT Abdomen Pelvis Without Contrast [[id number]] Collected: 01/24/22 1332   Order Status: Completed Updated: 01/24/22 1403   Narrative:     CMS MANDATED QUALITY DATA - CT RADIATION  436     All CT scans at this facility utilize dose modulation, iterative reconstruction, and/or weight based dosing when appropriate to reduce radiation dose to as low as reasonably achievable.     CT ABDOMEN PELVIS WITHOUT IV CONTRAST     CLINICAL HISTORY:   76 years Female Abdominal pain, acute, nonlocalized     COMPARISON: CT abdomen and pelvis January 23, 2022     FINDINGS: Images through the lower thorax demonstrate moderate right pleural fluid (which appears at least partially loculated) and small volume left pleural fluid. Bibasilar atelectasis/scarring.     Bone window images show no acute or aggressive osseous abnormality.     No focal hepatic lesion identified. Gallbladder is contracted or absent. Perihepatic fluid. Spleen is normal in size. Perisplenic fluid. Peripancreatic inflammatory stranding is evident, similar to prior, suggestive of acute pancreatitis. No adrenal lesion. Bilateral renal atrophy. Ureters are normal in caliber. Urinary bladder is collapsed.     No evidence of acute pathology involving the gastrointestinal tract. Distal colonic diverticula. Small volume free fluid within the abdomen and pelvis.     No free  intraperitoneal air. No pathologically enlarged abdominal or pelvic lymph nodes. Aortoiliac atherosclerotic calcification.     IMPRESSION:     Peripancreatic inflammatory stranding suggesting acute pancreatitis.     Free fluid within the abdomen and pelvis, similar to prior.     Partially loculated right pleural effusion.     Diverticulosis.     Aortoiliac atherosclerosis.          X-Ray Chest AP Portable [[id number]] Collected: 01/24/22 1234   Order Status: Completed Updated: 01/24/22 1305   Narrative:     Chest single view     CLINICAL DATA: Nausea, tachycardia     FINDINGS: AP view is compared to January 22.     The heart is enlarged. The aortic arch is calcified. Prosthetic mitral valve is noted. Prominence of the pulmonary vasculature and faint bilateral groundglass opacities are unchanged, with asymmetric airspace disease at the right lung base also stable. Small right pleural effusion is unchanged. There is no pneumothorax.     IMPRESSION:   1. No significant interval change compared to January 22.          Assessment/Plan:     Active Hospital Problems    Diagnosis  POA    *Acute pancreatitis [K85.90]  Yes    History of pancreatitis [Z87.19]  Not Applicable     Chronic    Chronic hypoxemic respiratory failure [J96.11]  Yes     Chronic     4 L      Diverticulosis [K57.90]  Yes     Chronic    Atrial fibrillation [I48.91]  Yes     Chronic    Long term current use of anticoagulant [Z79.01]  Not Applicable     Chronic    Chronic systolic heart failure [I50.22]  Yes     Chronic    HLD (hyperlipidemia) [E78.5]  Yes     Chronic    PVD (peripheral vascular disease) [I73.9]  Yes     Chronic    DNR (do not resuscitate) [Z66]  Yes     Chronic    COPD (chronic obstructive pulmonary disease) [J44.9]  Yes     Chronic    Pulmonary hypertension [I27.20]  Yes     Chronic    ESRD (end stage renal disease) on HD M,W, F [N18.6]  Yes     Chronic    HTN (hypertension) [I10]  Yes     Chronic      Resolved Hospital  Problems   No resolved problems to display.       Plan:  Pain control  NPO. Plan transition to clears once nausea improves  Careful with IVF due to ESRD  HD (MWF) per nephrology  Continue home medications  Tobacco cessation counseled.  Refused nicotine patch    Nephrology consult    DNR    VTE Risk Mitigation (From admission, onward)    None             Jhoan Fuentes MD  Department of Hospital Medicine   [facility]

## 2022-01-24 NOTE — SUBJECTIVE & OBJECTIVE
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    both legs    CHF (congestive heart failure)     Encounter for blood transfusion 03/2016    Gout     Hemodialysis access, AV graft     Hypertension     Mitral valve regurgitation     Osteoarthritis     Pancreatitis     Peripheral vascular disease     Pneumonia 09/09/2017    Renal failure        Past Surgical History:   Procedure Laterality Date    ACHILLES TENDON SURGERY Right     ANGIOGRAPHY OF ARTERIOVENOUS SHUNT Right 1/7/2020    Procedure: Fistulogram with Possible Intervention;  Surgeon: Joseph Loyola MD;  Location: [facility] CATH/EP LAB;  Service: Cardiology;  Laterality: Right;    ANGIOGRAPHY OF LOWER EXTREMITY Left 3/18/2020    Procedure: Angiogram Extremity Unilateral;  Surgeon: Ali Khoobehi, MD;  Location: [facility] CATH/EP LAB;  Service: Cardiology;  Laterality: Left;    APPENDECTOMY      CARDIAC CATHETERIZATION  07/03/2017    CHOLECYSTECTOMY      COLONOSCOPY Left 10/4/2020    Procedure: COLONOSCOPY;  Surgeon: Jordan Kilpatrick MD;  Location: [facility] ENDO;  Service: Endoscopy;  Laterality: Left;    ESOPHAGOGASTRODUODENOSCOPY Left 8/17/2020    Procedure: EGD (ESOPHAGOGASTRODUODENOSCOPY);  Surgeon: Talat Trevizo MD;  Location: [facility];  Service: Endoscopy;  Laterality: Left;    ESOPHAGOGASTRODUODENOSCOPY Left 10/2/2020    Procedure: EGD (ESOPHAGOGASTRODUODENOSCOPY);  Surgeon: Talat Trevizo MD;  Location: [facility];  Service: Endoscopy;  Laterality: Left;    ESOPHAGOGASTRODUODENOSCOPY N/A 6/29/2021    Procedure: EGD (ESOPHAGOGASTRODUODENOSCOPY);  Surgeon: Sudheer Brown III, MD;  Location: [facility] ENDO;  Service: Endoscopy;  Laterality: N/A;    heal surgery Right     HYSTERECTOMY      INSERTION OF STENT INTO PERIPHERAL VESSEL N/A 1/7/2020    Procedure: INSERTION, STENT, VESSEL, PERIPHERAL;  Surgeon: Joseph Loyola MD;  Location: [facility] CATH/EP LAB;  Service: Cardiology;   Laterality: N/A;    MITRAL VALVE REPLACEMENT      PARATHYROIDECTOMY      PARATHYROIDECTOMY  07/13/2017    PERCUTANEOUS TRANSLUMINAL ANGIOPLASTY OF ARTERIOVENOUS FISTULA N/A 1/7/2020    Procedure: PTA, AV FISTULA;  Surgeon: Joseph Loyola MD;  Location: [facility] CATH/EP LAB;  Service: Cardiology;  Laterality: N/A;    PERITONEAL CATHETER INSERTION      RENAL BIOPSY      SMALL BOWEL ENTEROSCOPY N/A 12/2/2020    Procedure: ENTEROSCOPY;  Surgeon: Sudheer Brown III, MD;  Location: [facility] ENDO;  Service: Endoscopy;  Laterality: N/A;    vocal cord nodule      WOUND DEBRIDEMENT Left 7/13/2020    Procedure: DEBRIDEMENT, WOUND;  Surgeon: Carlos Elam III, MD;  Location: [facility] OR;  Service: General;  Laterality: Left;       Review of patient's allergies indicates:  No Known Allergies    No current facility-administered medications on file prior to encounter.     Current Outpatient Medications on File Prior to Encounter   Medication Sig    albuterol (ACCUNEB) 1.25 mg/3 mL Nebu Take 1.25 mg by nebulization every 6 (six) hours as needed. Rescue  HCS    albuterol (PROVENTIL/VENTOLIN HFA) 90 mcg/actuation inhaler Inhale 1 puff into the lungs every 4 (four) hours as needed for Wheezing. rescue    aspirin (ECOTRIN) 81 MG EC tablet Take 81 mg by mouth once daily.    calcium acetate (PHOSLO) 667 mg capsule Take 667 mg by mouth once daily.     diltiaZEM (CARDIZEM) 30 MG tablet Take 1 tablet (30 mg total) by mouth 4 (four) times daily before meals and nightly.    hydrALAZINE (APRESOLINE) 50 MG tablet Take 1 tablet (50 mg total) by mouth every 8 (eight) hours.    isosorbide mononitrate (IMDUR) 30 MG 24 hr tablet Take 1 tablet (30 mg total) by mouth once daily.    losartan (COZAAR) 25 MG tablet Take 25 mg by mouth every evening.    metoprolol succinate (TOPROL-XL) 25 MG 24 hr tablet Take 1 tablet (25 mg total) by mouth nightly.    ondansetron (ZOFRAN) 4 MG tablet Take 1 tablet (4 mg total) by mouth every 6 (six)  hours as needed for Nausea.    oxyCODONE-acetaminophen (PERCOCET) 5-325 mg per tablet Take 1 tablet by mouth every 4 (four) hours as needed for Pain.    pantoprazole (PROTONIX) 40 MG tablet Take 40 mg by mouth once daily.    warfarin (COUMADIN) 5 MG tablet Take 1 tablet (5 mg total) by mouth Daily.    levalbuterol (XOPENEX) 0.63 mg/3 mL nebulizer solution Take 3 mLs (0.63 mg total) by nebulization every 8 (eight) hours as needed for Wheezing or Shortness of Breath. Rescue     Family History     Problem Relation (Age of Onset)    Arthritis Mother    Diabetes Mother, Father    Early death Sister, Sister    Heart disease Sister, Maternal Grandfather, Mother    Heart failure Mother    Hypertension Mother        Tobacco Use    Smoking status: Current Some Day Smoker     Packs/day: 0.50     Years: 45.00     Pack years: 22.50     Types: Cigarettes    Smokeless tobacco: Never Used   Substance and Sexual Activity    Alcohol use: No    Drug use: No    Sexual activity: Not Currently     Review of Systems   Constitutional: Negative for chills, fatigue, fever and unexpected weight change.   HENT: Negative for ear pain, rhinorrhea, sneezing and sore throat.    Eyes: Negative for visual disturbance.   Respiratory: Negative for cough, chest tightness and shortness of breath.    Cardiovascular: Negative for chest pain.   Gastrointestinal: Negative for abdominal pain, constipation, diarrhea, nausea and vomiting.   Endocrine: Negative for polyuria.   Genitourinary: Negative for dysuria and hematuria.   Neurological: Negative for seizures and headaches.     Objective:     Vital Signs (Most Recent):  Temp: 97.6 °F (36.4 °C) (01/24/22 1201)  Pulse: (!) 141 (01/24/22 1400)  Resp: (!) 22 (01/24/22 1313)  BP: (!) 154/95 (01/24/22 1400)  SpO2: 100 % (01/24/22 1400) Vital Signs (24h Range):  Temp:  [97.6 °F (36.4 °C)] 97.6 °F (36.4 °C)  Pulse:  [134-144] 141  Resp:  [18-22] 22  SpO2:  [92 %-100 %] 100 %  BP: (139-195)/(95-99) 154/95      Weight: 55.8 kg (123 lb)  Body mass index is 20.47 kg/m².    Physical Exam  Vitals and nursing note reviewed.   Constitutional:       General: She is not in acute distress.     Appearance: Normal appearance. She is well-developed. She is ill-appearing (Chronically ill appearing). She is not toxic-appearing or diaphoretic.      Comments: Thin appearing   HENT:      Head: Normocephalic and atraumatic.      Right Ear: External ear normal.      Left Ear: External ear normal.      Nose: Nose normal. No congestion.      Mouth/Throat:      Mouth: Mucous membranes are moist.      Pharynx: No oropharyngeal exudate or posterior oropharyngeal erythema.   Eyes:      General: No scleral icterus.        Right eye: No discharge.         Left eye: No discharge.      Extraocular Movements: Extraocular movements intact.      Conjunctiva/sclera: Conjunctivae normal.      Pupils: Pupils are equal, round, and reactive to light.   Neck:      Thyroid: No thyromegaly.   Cardiovascular:      Rate and Rhythm: Tachycardia present. Rhythm irregular.      Heart sounds: Normal heart sounds. No murmur heard.  No friction rub. No gallop.    Pulmonary:      Effort: Pulmonary effort is normal. No respiratory distress.      Breath sounds: Normal breath sounds. No wheezing or rales.   Chest:      Chest wall: No tenderness.   Abdominal:      General: Bowel sounds are normal. There is no distension.      Palpations: Abdomen is soft. There is no mass.      Tenderness: There is abdominal tenderness (Epigastric). There is no guarding or rebound.      Hernia: No hernia is present.   Musculoskeletal:         General: No tenderness. Normal range of motion.      Cervical back: Normal range of motion and neck supple.   Lymphadenopathy:      Cervical: No cervical adenopathy.   Skin:     General: Skin is warm.   Neurological:      Mental Status: She is alert and oriented to person, place, and time.   Psychiatric:         Mood and Affect: Mood normal.          Behavior: Behavior normal.         Thought Content: Thought content normal.         Judgment: Judgment normal.           CRANIAL NERVES     CN III, IV, VI   Pupils are equal, round, and reactive to light.       Significant Labs:   All pertinent labs within the past 24 hours have been reviewed.  CBC:   Recent Labs   Lab 01/23/22  0023 01/24/22  1329   WBC 4.01 5.01   HGB 12.0 13.0   HCT 38.0 39.9   * 108*     CMP:   Recent Labs   Lab 01/23/22  0023 01/24/22  1329   * 135*   K 3.4* 3.7   CL 93* 86*   CO2 25 27   GLU 84 52*   BUN 42* 26*   CREATININE 6.5* 4.9*   CALCIUM 7.5* 8.4*   PROT 7.5 7.7   ALBUMIN 3.3* 3.6   BILITOT 0.9 1.7*   ALKPHOS 75 86   AST 16 49*   ALT 9* 19   ANIONGAP 17* 22*   EGFRNONAA 5.7* 8.1*     Cardiac Markers:   Recent Labs   Lab 01/24/22  1329   BNP >4,500*  >4,500*     Lactic Acid:   Recent Labs   Lab 01/23/22  0023 01/24/22  1329   LACTATE 1.1 2.8*     Troponin:   Recent Labs   Lab 01/23/22  0023 01/24/22  1329   TROPONINI 0.042* 0.066*     EKG  My personal interpretation: No acute ST elevation or depression appreciated     Significant Imaging:     CT Abdomen Pelvis Without Contrast [[id number]] Collected: 01/24/22 1332   Order Status: Completed Updated: 01/24/22 1403   Narrative:     CMS MANDATED QUALITY DATA - CT RADIATION  436     All CT scans at this facility utilize dose modulation, iterative reconstruction, and/or weight based dosing when appropriate to reduce radiation dose to as low as reasonably achievable.     CT ABDOMEN PELVIS WITHOUT IV CONTRAST     CLINICAL HISTORY:   76 years Female Abdominal pain, acute, nonlocalized     COMPARISON: CT abdomen and pelvis January 23, 2022     FINDINGS: Images through the lower thorax demonstrate moderate right pleural fluid (which appears at least partially loculated) and small volume left pleural fluid. Bibasilar atelectasis/scarring.     Bone window images show no acute or aggressive osseous abnormality.     No focal hepatic lesion  identified. Gallbladder is contracted or absent. Perihepatic fluid. Spleen is normal in size. Perisplenic fluid. Peripancreatic inflammatory stranding is evident, similar to prior, suggestive of acute pancreatitis. No adrenal lesion. Bilateral renal atrophy. Ureters are normal in caliber. Urinary bladder is collapsed.     No evidence of acute pathology involving the gastrointestinal tract. Distal colonic diverticula. Small volume free fluid within the abdomen and pelvis.     No free intraperitoneal air. No pathologically enlarged abdominal or pelvic lymph nodes. Aortoiliac atherosclerotic calcification.     IMPRESSION:     Peripancreatic inflammatory stranding suggesting acute pancreatitis.     Free fluid within the abdomen and pelvis, similar to prior.     Partially loculated right pleural effusion.     Diverticulosis.     Aortoiliac atherosclerosis.          X-Ray Chest AP Portable [[id number]] Collected: 01/24/22 1234   Order Status: Completed Updated: 01/24/22 1305   Narrative:     Chest single view     CLINICAL DATA: Nausea, tachycardia     FINDINGS: AP view is compared to January 22.     The heart is enlarged. The aortic arch is calcified. Prosthetic mitral valve is noted. Prominence of the pulmonary vasculature and faint bilateral groundglass opacities are unchanged, with asymmetric airspace disease at the right lung base also stable. Small right pleural effusion is unchanged. There is no pneumothorax.     IMPRESSION:   1. No significant interval change compared to January 22.

## 2022-01-24 NOTE — FIRST PROVIDER EVALUATION
Emergency Department TeleTriage Encounter Note      CHIEF COMPLAINT    Chief Complaint   Patient presents with    Abdominal Pain     X 6 days. Recent ed visit for same complaint. Pt reports pain is no better than her last visit       VITAL SIGNS   Initial Vitals [01/24/22 1201]   BP Pulse Resp Temp SpO2   (!) 195/99 (!) 134 18 97.6 °F (36.4 °C) (!) 92 %      MAP       --            ALLERGIES    Review of patient's allergies indicates:  No Known Allergies    PROVIDER TRIAGE NOTE  This is a teletriage evaluation of a 76 y.o. female presenting to the ED complaining of abd pain with n/v/d for six days.  Was seen on 1/22 and CT report-   Mild peripancreatic inflammatory change suggests acute pancreatitis. No organized fluid collections. Correlate with lipase levels    Small loculated right pleural effusion.    Mild ascites.    Diverticulosis without evidence of acute diverticulitis..   Pt reports that she has had no improvement.      Initial orders will be placed and care will be transferred to an alternate provider when patient is roomed for a full evaluation. Any additional orders and the final disposition will be determined by that provider.           ORDERS  Labs Reviewed   CBC W/ AUTO DIFFERENTIAL   COMPREHENSIVE METABOLIC PANEL   LIPASE   URINALYSIS, REFLEX TO URINE CULTURE   LACTIC ACID, PLASMA       ED Orders (720h ago, onward)    Start Ordered     Status Ordering Provider    01/24/22 1211 01/24/22 1210  Vital signs  Every 2 hours         Ordered FRANC GRIER N.    01/24/22 1210 01/24/22 1210  Diet NPO  Diet effective now         Ordered FRANC GRIER N.    01/24/22 1210 01/24/22 1210  Insert peripheral IV  Once         Ordered FRANC GRIER N.    01/24/22 1210 01/24/22 1210  CBC W/ AUTO DIFFERENTIAL  STAT         Ordered FRANC GRIER N.    01/24/22 1210 01/24/22 1210  Comp. Metabolic Panel  STAT         Ordered FRANC GRIER N.    01/24/22 1210 01/24/22 1210   Lipase  STAT         Ordered MARVEL FRANC N.    01/24/22 1210 01/24/22 1210  Urinalysis, Reflex to Urine Culture Urine, Clean Catch  STAT         Ordered FRANC GRIER N.    01/24/22 1210 01/24/22 1210  EKG 12-lead  Once         Ordered MARVEL FRANC N.    01/24/22 1210 01/24/22 1210  Lactic acid, plasma  STAT         Ordered FRANC GRIER.            Virtual Visit Note: The provider triage portion of this emergency department evaluation and documentation was performed via [employer], a HIPAA-compliant telemedicine application, in concert with a tele-presenter in the room. A face to face patient evaluation with one of my colleagues will occur once the patient is placed in an emergency department room.      DISCLAIMER: This note was prepared with 3dim voice recognition transcription software. Garbled syntax, mangled pronouns, and other bizarre constructions may be attributed to that software system.

## 2022-01-24 NOTE — ED PROVIDER NOTES
Encounter Date: 1/24/2022       History     Chief Complaint   Patient presents with    Abdominal Pain     X 6 days. Recent ed visit for same complaint. Pt reports pain is no better than her last visit     HPI  76-year-old female with past medical history of persistent AFib on warfarin, CHF, ESRD on HD (MWF), HTN, mitral valve replacement, PVD, chronic pancreatitis, COPD on 5 L nasal cannula, gastritis presents complaining of escalating epigastric abdominal pain.  Symptoms have been present the past 6 days.  Patient was previously seen in the emergency room for similar symptoms and diagnosed with pancreatitis.  She was offered admission to the hospital by report on old chart review and declined at that time.  She presents secondary to persistent pain not improving but not worsening.  Pain has been moderate to severe in intensity.  She has had a decreased appetite.  She denies fever chest pain or shortness of breath.  She denies gastrointestinal bleeding.  She denies syncope or near-syncope.        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    both legs    CHF (congestive heart failure)     Encounter for blood transfusion 03/2016    Gout     Hemodialysis access, AV graft     Hypertension     Mitral valve regurgitation     Osteoarthritis     Pancreatitis     Peripheral vascular disease     Pneumonia 09/09/2017    Renal failure      Past Surgical History:   Procedure Laterality Date    ACHILLES TENDON SURGERY Right     ANGIOGRAPHY OF ARTERIOVENOUS SHUNT Right 1/7/2020    Procedure: Fistulogram with Possible Intervention;  Surgeon: Joseph Loyola MD;  Location: [facility] CATH/EP LAB;  Service: Cardiology;  Laterality: Right;    ANGIOGRAPHY OF LOWER EXTREMITY Left 3/18/2020    Procedure: Angiogram Extremity Unilateral;  Surgeon: Ali Khoobehi, MD;  Location: [facility] CATH/EP LAB;  Service:  Cardiology;  Laterality: Left;    APPENDECTOMY      CARDIAC CATHETERIZATION  07/03/2017    CHOLECYSTECTOMY      COLONOSCOPY Left 10/4/2020    Procedure: COLONOSCOPY;  Surgeon: Jordan Kilpatrick MD;  Location: [facility] ENDO;  Service: Endoscopy;  Laterality: Left;    ESOPHAGOGASTRODUODENOSCOPY Left 8/17/2020    Procedure: EGD (ESOPHAGOGASTRODUODENOSCOPY);  Surgeon: Talat Trevizo MD;  Location: [facility] ENDO;  Service: Endoscopy;  Laterality: Left;    ESOPHAGOGASTRODUODENOSCOPY Left 10/2/2020    Procedure: EGD (ESOPHAGOGASTRODUODENOSCOPY);  Surgeon: Talat Trevizo MD;  Location: [facility] ENDO;  Service: Endoscopy;  Laterality: Left;    ESOPHAGOGASTRODUODENOSCOPY N/A 6/29/2021    Procedure: EGD (ESOPHAGOGASTRODUODENOSCOPY);  Surgeon: Sudheer Brown III, MD;  Location: [facility] ENDO;  Service: Endoscopy;  Laterality: N/A;    heal surgery Right     HYSTERECTOMY      INSERTION OF STENT INTO PERIPHERAL VESSEL N/A 1/7/2020    Procedure: INSERTION, STENT, VESSEL, PERIPHERAL;  Surgeon: Joseph Loyola MD;  Location: [facility] CATH/EP LAB;  Service: Cardiology;  Laterality: N/A;    MITRAL VALVE REPLACEMENT      PARATHYROIDECTOMY      PARATHYROIDECTOMY  07/13/2017    PERCUTANEOUS TRANSLUMINAL ANGIOPLASTY OF ARTERIOVENOUS FISTULA N/A 1/7/2020    Procedure: PTA, AV FISTULA;  Surgeon: Joseph Loyola MD;  Location: [facility] CATH/EP LAB;  Service: Cardiology;  Laterality: N/A;    PERITONEAL CATHETER INSERTION      RENAL BIOPSY      SMALL BOWEL ENTEROSCOPY N/A 12/2/2020    Procedure: ENTEROSCOPY;  Surgeon: Sudheer Brown III, MD;  Location: [facility] ENDO;  Service: Endoscopy;  Laterality: N/A;    vocal cord nodule      WOUND DEBRIDEMENT Left 7/13/2020    Procedure: DEBRIDEMENT, WOUND;  Surgeon: Carlos Elam III, MD;  Location: [facility] OR;  Service: General;  Laterality: Left;     Family History   Problem Relation Age of Onset    Heart disease Sister     Early death Sister         heart as baby    Heart disease Maternal  Grandfather     Diabetes Mother     Hypertension Mother     Heart failure Mother     Heart disease Mother     Arthritis Mother     Diabetes Father     Early death Sister         infant     Social History     Tobacco Use    Smoking status: Current Some Day Smoker     Packs/day: 0.50     Years: 45.00     Pack years: 22.50     Types: Cigarettes    Smokeless tobacco: Never Used   Substance Use Topics    Alcohol use: No    Drug use: No     Review of Systems   Constitutional: Positive for appetite change and fatigue. Negative for activity change, chills and fever.   HENT: Negative.  Negative for congestion, dental problem, ear pain, rhinorrhea, sinus pressure, sinus pain, sore throat and trouble swallowing.    Eyes: Negative.  Negative for photophobia, pain, redness and visual disturbance.   Respiratory: Negative.  Negative for cough, chest tightness, shortness of breath and wheezing.    Cardiovascular: Negative.  Negative for chest pain, palpitations and leg swelling.   Gastrointestinal: Positive for abdominal distention and abdominal pain. Negative for anal bleeding, blood in stool, constipation, diarrhea, nausea and vomiting.   Endocrine: Negative.    Genitourinary: Negative.  Negative for decreased urine volume, difficulty urinating, dysuria, flank pain, frequency, hematuria, pelvic pain and urgency.   Musculoskeletal: Negative.  Negative for arthralgias, back pain, gait problem, joint swelling, myalgias, neck pain and neck stiffness.   Skin: Negative.  Negative for color change, pallor and rash.   Neurological: Negative.  Negative for dizziness, tremors, seizures, syncope, facial asymmetry, speech difficulty, weakness, light-headedness, numbness and headaches.   Hematological: Negative.  Does not bruise/bleed easily.   Psychiatric/Behavioral: Negative.  Negative for confusion.   All other systems reviewed and are negative.      Physical Exam     Initial Vitals [01/24/22 1201]   BP Pulse Resp Temp SpO2    (!) 195/99 (!) 134 18 97.6 °F (36.4 °C) (!) 92 %      MAP       --         Physical Exam    Nursing note and vitals reviewed.  Constitutional: She is not diaphoretic. She is active and cooperative.  Non-toxic appearance. She does not have a sickly appearance. She does not appear ill. No distress.   HENT:   Head: Normocephalic and atraumatic.   Right Ear: Tympanic membrane normal.   Left Ear: Tympanic membrane normal.   Nose: Nose normal.   Mouth/Throat: Uvula is midline, oropharynx is clear and moist and mucous membranes are normal. No oral lesions. No uvula swelling. No oropharyngeal exudate, posterior oropharyngeal edema or posterior oropharyngeal erythema.   Eyes: Conjunctivae, EOM and lids are normal. Pupils are equal, round, and reactive to light. Right eye exhibits no discharge. Left eye exhibits no discharge. No scleral icterus.   Neck: Trachea normal and phonation normal. Neck supple. No thyroid mass and no thyromegaly present. No stridor present. No tracheal deviation present. No JVD present.   Normal range of motion.   Full passive range of motion without pain.     Cardiovascular: Normal rate, regular rhythm, normal heart sounds, intact distal pulses and normal pulses. Exam reveals no gallop, no distant heart sounds and no friction rub.    No murmur heard.  Pulmonary/Chest: Effort normal and breath sounds normal. No accessory muscle usage or stridor. No tachypnea. No respiratory distress. She has no wheezes. She has no rhonchi. She has no rales.   Abdominal: Abdomen is soft. Normal appearance. She exhibits no distension, no pulsatile midline mass and no mass. Bowel sounds are decreased. There is generalized abdominal tenderness. No hernia.   No right CVA tenderness.  No left CVA tenderness. There is no rigidity, no rebound, no guarding, no CVA tenderness, no tenderness at McBurney's point and negative Rome's sign. positive Rovsing's sign  Musculoskeletal:         General: No tenderness or edema. Normal  range of motion.      Cervical back: Full passive range of motion without pain, normal range of motion and neck supple. No edema, erythema or rigidity. No spinous process tenderness or muscular tenderness. Normal range of motion.      Comments: Pulses are 2+ throughout, cap refill is less than 2 sec throughout, extremities are nontender throughout with full range of motion. There is no spinal tenderness to palpation.     Neurological: She is alert and oriented to person, place, and time. She has normal strength. She displays normal reflexes. No cranial nerve deficit or sensory deficit. Gait normal.   No focal deficits.   Skin: Skin is warm, dry and intact. Capillary refill takes less than 2 seconds. No ecchymosis, no petechiae and no rash noted. No erythema. No pallor.   Psychiatric: She has a normal mood and affect. Her speech is normal and behavior is normal. Judgment and thought content normal. Cognition and memory are normal.         ED Course   Procedures  Labs Reviewed   CBC W/ AUTO DIFFERENTIAL - Abnormal; Notable for the following components:       Result Value    RDW 15.4 (*)     Platelets 108 (*)     Immature Granulocytes 1.6 (*)     Immature Grans (Abs) 0.08 (*)     All other components within normal limits   COMPREHENSIVE METABOLIC PANEL - Abnormal; Notable for the following components:    Sodium 135 (*)     Chloride 86 (*)     Glucose 52 (*)     BUN 26 (*)     Creatinine 4.9 (*)     Calcium 8.4 (*)     Total Bilirubin 1.7 (*)     AST 49 (*)     Anion Gap 22 (*)     eGFR if  9.3 (*)     eGFR if non  8.1 (*)     All other components within normal limits    Narrative:     Glucose critical result(s) repeated. Called and verbal readback   obtained from [facility].  by CW1 01/24/2022 14:37   LIPASE - Abnormal; Notable for the following components:    Lipase 93 (*)     All other components within normal limits   LACTIC ACID, PLASMA - Abnormal; Notable for the  following components:    Lactate (Lactic Acid) 2.8 (*)     All other components within normal limits    Narrative:      Lactic Acid critical result(s) repeated. Called and verbal readback   obtained from Annmar[facility] CARRIZALES.  by CW1 01/24/2022 14:37   TROPONIN I - Abnormal; Notable for the following components:    Troponin I 0.066 (*)     All other components within normal limits   B-TYPE NATRIURETIC PEPTIDE - Abnormal; Notable for the following components:    BNP >4,500 (*)     All other components within normal limits   APTT - Abnormal; Notable for the following components:    aPTT 36.5 (*)     All other components within normal limits   B-TYPE NATRIURETIC PEPTIDE - Abnormal; Notable for the following components:    BNP >4,500 (*)     All other components within normal limits   PROTIME-INR - Abnormal; Notable for the following components:    PT 23.1 (*)     All other components within normal limits   POCT GLUCOSE - Abnormal; Notable for the following components:    POC Glucose 66 (*)     All other components within normal limits   MAGNESIUM   TSH        ECG Results          EKG 12-lead (In process)  Result time 01/24/22 13:20:05    In process by Interface, Lab In [facility] (01/24/22 13:20:05)                 Narrative:    Test Reason : R10.9,    Vent. Rate : 142 BPM     Atrial Rate : 000 BPM     P-R Int : 000 ms          QRS Dur : 096 ms      QT Int : 302 ms       P-R-T Axes : 000 -72 065 degrees     QTc Int : 464 ms    Atrial fibrillation with rapid ventricular response  Left anterior fascicular block  Minimal voltage criteria for LVH, may be normal variant ( Zachery product )  Nonspecific ST abnormality  Abnormal ECG  When compared with ECG of 22-JAN-2022 23:45,  Nonspecific T wave abnormality no longer evident in Anterior leads    Referred By: [email or web address]   SELF           Confirmed By:                             Imaging Results          CT Abdomen Pelvis  Without Contrast (Final result)  Result time 01/24/22  14:01:31    Final result by Harish Liu MD (01/24/22 14:01:31)                 Narrative:    CMS MANDATED QUALITY DATA - CT RADIATION  436    All CT scans at this facility utilize dose modulation, iterative reconstruction, and/or weight based dosing when appropriate to reduce radiation dose to as low as reasonably achievable.    CT ABDOMEN PELVIS WITHOUT IV CONTRAST    CLINICAL HISTORY:  76 years Female Abdominal pain, acute, nonlocalized    COMPARISON: CT abdomen and pelvis January 23, 2022    FINDINGS: Images through the lower thorax demonstrate moderate right pleural fluid (which appears at least partially loculated) and small volume left pleural fluid. Bibasilar atelectasis/scarring.    Bone window images show no acute or aggressive osseous abnormality.    No focal hepatic lesion identified. Gallbladder is contracted or absent. Perihepatic fluid. Spleen is normal in size. Perisplenic fluid. Peripancreatic inflammatory stranding is evident, similar to prior, suggestive of acute pancreatitis. No adrenal lesion. Bilateral renal atrophy. Ureters are normal in caliber. Urinary bladder is collapsed.    No evidence of acute pathology involving the gastrointestinal tract. Distal colonic diverticula. Small volume free fluid within the abdomen and pelvis.    No free intraperitoneal air. No pathologically enlarged abdominal or pelvic lymph nodes. Aortoiliac atherosclerotic calcification.    IMPRESSION:    Peripancreatic inflammatory stranding suggesting acute pancreatitis.    Free fluid within the abdomen and pelvis, similar to prior.    Partially loculated right pleural effusion.    Diverticulosis.    Aortoiliac atherosclerosis.    Electronically signed by:  Harish Liu MD  1/24/2022 2:01 PM CST Workstation: [id number]                             X-Ray Chest AP Portable (Final result)  Result time 01/24/22 13:03:15   Procedure changed from X-Ray Chest 1 View     Final result by Aidan Mullen MD (01/24/22  13:03:15)                 Narrative:    Chest single view    CLINICAL DATA: Nausea, tachycardia    FINDINGS: AP view is compared to January 22.    The heart is enlarged. The aortic arch is calcified. Prosthetic mitral valve is noted. Prominence of the pulmonary vasculature and faint bilateral groundglass opacities are unchanged, with asymmetric airspace disease at the right lung base also stable. Small right pleural effusion is unchanged. There is no pneumothorax.    IMPRESSION:  1. No significant interval change compared to January 22.    Electronically signed by:  Aidan Mullen MD  1/24/2022 1:03 PM [facility] Workstation: [id number]                               Medications   aspirin EC tablet 81 mg (81 mg Oral Given 1/25/22 0851)   calcium acetate(phosphat bind) capsule 667 mg (667 mg Oral Not Given 1/25/22 0900)   diltiaZEM tablet 30 mg (30 mg Oral Given 1/25/22 1701)   isosorbide mononitrate 24 hr tablet 30 mg (30 mg Oral Given 1/25/22 0851)   losartan tablet 25 mg (25 mg Oral Given 1/25/22 0003)   metoprolol succinate (TOPROL-XL) 24 hr tablet 25 mg (25 mg Oral Given 1/25/22 0004)   oxyCODONE-acetaminophen 5-325 mg per tablet 1 tablet (1 tablet Oral Given 1/25/22 1701)   pantoprazole EC tablet 40 mg (40 mg Oral Given 1/25/22 0624)   warfarin (COUMADIN) tablet 5 mg (5 mg Oral Given 1/25/22 1701)   sodium chloride 0.9% flush 2 mL (has no administration in time range)   acetaminophen tablet 650 mg (has no administration in time range)   polyethylene glycol packet 17 g (has no administration in time range)   ondansetron injection 4 mg (4 mg Intravenous Given 1/25/22 0410)   prochlorperazine injection Soln 5 mg (has no administration in time range)   albuterol-ipratropium 2.5 mg-0.5 mg/3 mL nebulizer solution 3 mL (has no administration in time range)   acetaminophen tablet 650 mg (has no administration in time range)   melatonin tablet 6 mg (has no administration in time range)   simethicone chewable tablet 80 mg  (has no administration in time range)   naloxone 0.4 mg/mL injection 0.02 mg (has no administration in time range)   metoprolol injection 5 mg (has no administration in time range)   HYDROmorphone injection 0.5 mg (0.5 mg Intravenous Given 1/25/22 1057)   dextrose 5 % in lactated ringers infusion ( Intravenous Canceled Entry 1/25/22 0730)   insulin regular injection 1-6 Units (has no administration in time range)   dextrose 50% injection 25 g (has no administration in time range)   dextrose 50% injection 12.5 g (has no administration in time range)   acetaminophen tablet 1,000 mg (1,000 mg Oral Given 1/24/22 1313)   morphine injection 2 mg (2 mg Intravenous Given 1/24/22 1313)   ondansetron injection 4 mg (4 mg Intravenous Given 1/24/22 1300)   0.9%  NaCl infusion ( Intravenous New Bag 1/24/22 1313)   piperacillin-tazobactam 4.5 g in dextrose 5 % 100 mL IVPB (ready to mix system) (0 g Intravenous Stopped 1/24/22 1650)   dextrose 50% injection 25 g (25 g Intravenous Given 1/24/22 2017)   diphenhydrAMINE injection 6.25 mg (6.25 mg Intravenous Given 1/24/22 2043)   dextrose 50% injection 25 g ( Intravenous Canceled Entry 1/25/22 0715)                          Clinical Impression:   Final diagnoses:  [R10.9] Abdominal pain  [R00.0] Tachycardia  [R07.9] Chest pain  [K85.90] Acute pancreatitis          ED Disposition Condition    Admit               Nereida Sauer MD  01/25/22 [id number]

## 2022-01-24 NOTE — HPI
HPI per patient and per patient's family is at bedside  76-year-old female with COPD on 4 L, current tobacco smoker, history of pancreatitis, atrial fibrillation on warfarin, mitral replacement,  ESRD (MWF), hypertension, hyperlipidemia, pulmonary hypertension, PVD, diverticulosis, DNR comes in for epigastric pain.  Patient was seen in the ED yesterday and was diagnosed with acute pancreatitis and was recommended to be admitted however patient refused.  Patient reports that she developed epigastric pain about 6 days ago.  Describes pain as sharp nonradiating severe pain.  Reported that she had decreased appetite.  States that she was unable to eat since about 3 days ago.  Denies fever, chills, chest pain, shortness of breath.  Patient reported the pain was persistent today and cannot tolerate anymore and came to the ED for further evaluation.

## 2022-01-25 NOTE — CARE UPDATE
01/24/22 2007   Patient Assessment/Suction   Level of Consciousness (AVPU) alert   Respiratory Effort Unlabored   Expansion/Accessory Muscles/Retractions expansion symmetric;no retractions;no use of accessory muscles   All Lung Fields Breath Sounds diminished   Rhythm/Pattern, Respiratory pattern regular;unlabored   PRE-TX-O2   O2 Device (Oxygen Therapy) nasal cannula   $ Is the patient on Low Flow Oxygen? Yes   Flow (L/min) 2   SpO2 99 %   Pulse 102   Resp 20   Respiratory Interventions   Cough And Deep Breathing done with encouragement   Breathing Techniques/Airway Clearance deep/controlled cough encouraged;diaphragmatic breathing promoted;pursed-lip breathing encouraged   Education   $ Education Other (see comment);15 min  (o2,pox,deep diaphragmatic breathing exercises)   Respiratory Evaluation   $ Care Plan Tech Time 15 min   $ Eval/Re-eval Charges Evaluation   Evaluation For New Orders   Home Oxygen   Has Home Oxygen? Yes   Liter Flow 4

## 2022-01-25 NOTE — PROGRESS NOTES
[facility] Medicine  Progress Note    Patient name: Griselda Neely  MRN: [id number]  Admit Date: 1/24/2022   LOS: 1 day     SUBJECTIVE:     Principal problem: Acute pancreatitis    Interval History:  Patient continued to be in a hypoglycemic overnight and required D5 infusion.  Continues to have epigastric pain.  Improved from yesterday.  Pain is controlled.  Has an appetite but does not want to apple juice or coffee.  Encourage patient with fluid intake.    Hospital course  Patient was admitted for acute pancreatitis.  Patient was placed on bowel rest and IV fluids.    Scheduled Meds:   aspirin  81 mg Oral Daily    calcium acetate(phosphat bind)  667 mg Oral Daily    diltiaZEM  30 mg Oral QID (AC & HS)    isosorbide mononitrate  30 mg Oral Daily    losartan  25 mg Oral QHS    metoprolol succinate  25 mg Oral Nightly    pantoprazole  40 mg Oral Daily    warfarin  5 mg Oral Daily     Continuous Infusions:   dextrose 5% lactated ringers 50 mL/hr at 01/25/22 0623     PRN Meds:acetaminophen, acetaminophen, albuterol-ipratropium, HYDROmorphone, melatonin, metoprolol, naloxone, ondansetron, oxyCODONE-acetaminophen, polyethylene glycol, prochlorperazine, simethicone, sodium chloride 0.9%    Review of patient's allergies indicates:  No Known Allergies    Review of Systems  As per subjective    OBJECTIVE:     Vital Signs (Most Recent)  Temp: 98.7 °F (37.1 °C) (01/25/22 1300)  Pulse: 99 (01/25/22 1300)  Resp: 19 (01/25/22 1300)  BP: 124/72 (01/25/22 1300)  SpO2: 98 % (01/25/22 1300)    Vital Signs Range (Last 24H):  Temp:  [97.5 °F (36.4 °C)-100.9 °F (38.3 °C)]   Pulse:  []   Resp:  [16-20]   BP: (102-165)/()   SpO2:  [95 %-100 %]     I & O (Last 24H):    Intake/Output Summary (Last 24 hours) at 1/25/2022 1408  Last data filed at 1/24/2022 1650  Gross per 24 hour   Intake 100 ml   Output --   Net 100 ml       Physical Exam:  General: Patient resting comfortably in no acute  distress. Appears as stated age. Calm. Thin-appearing  Eyes: EOM intact. No conjunctivae injection. No scleral icterus.  ENT: Hearing grossly intact. No discharge from ears. No nasal discharge.   CVS: IRR. No LE edema BL.  Lungs: CTA BL, no wheezing or crackles. Good breath sounds. No accessory muscle use. No acute respiratory distress  Ab: Mild epigastric pain  Neuro: Alert. Cranial nerves grossly intact. Moves all extremities equally. Follows commands. Responds appropriately   Psych: Mood, behavior, thought content and judgement normal    Laboratory:  All pertinent labs within the past 24 hours have been reviewed.  CBC:   Recent Labs   Lab 01/24/22  1329 01/25/22 0450   WBC 5.01 4.95   HGB 13.0 13.0   HCT 39.9 42.0   * 102*     CMP:   Recent Labs   Lab 01/24/22  1329 01/25/22 0450   * 134*   K 3.7 3.9   CL 86* 90*   CO2 27 24   GLU 52* 46*   BUN 26* 34*   CREATININE 4.9* 5.7*   CALCIUM 8.4* 8.2*   PROT 7.7  --    ALBUMIN 3.6  --    BILITOT 1.7*  --    ALKPHOS 86  --    AST 49*  --    ALT 19  --    ANIONGAP 22* 20*   EGFRNONAA 8.1* 6.7*       Microbiology Results (last 7 days)     Procedure Component Value Units Date/Time    Blood culture #1 **CANNOT BE ORDERED STAT** [[id number]] Collected: 01/24/22 1330    Order Status: Completed Specimen: Blood from Peripheral, Wrist, Left Updated: 01/24/22 2117     Blood Culture, Routine No Growth to date    Blood culture #2 **CANNOT BE ORDERED STAT** [[id number]] Collected: 01/24/22 1330    Order Status: Completed Specimen: Blood from Peripheral, Antecubital, Left Updated: 01/24/22 2117     Blood Culture, Routine No Growth to date           Diagnostic Results:      ASSESSMENT/PLAN:     Active Hospital Problems    Diagnosis  POA    *Acute pancreatitis [K85.90]  Yes    History of pancreatitis [Z87.19]  Not Applicable     Chronic    Chronic hypoxemic respiratory failure [J96.11]  Yes     Chronic     4 L      Diverticulosis [K57.90]  Yes     Chronic    Atrial  fibrillation [I48.91]  Yes     Chronic    Long term current use of anticoagulant [Z79.01]  Not Applicable     Chronic    Chronic systolic heart failure [I50.22]  Yes     Chronic    HLD (hyperlipidemia) [E78.5]  Yes     Chronic    PVD (peripheral vascular disease) [I73.9]  Yes     Chronic    DNR (do not resuscitate) [Z66]  Yes     Chronic    COPD (chronic obstructive pulmonary disease) [J44.9]  Yes     Chronic    Pulmonary hypertension [I27.20]  Yes     Chronic    ESRD (end stage renal disease) on HD M,W, F [N18.6]  Yes     Chronic    HTN (hypertension) [I10]  Yes     Chronic      Resolved Hospital Problems   No resolved problems to display.           Plan:   Pain control  Clears  D5 due to hypoglycemia and poor oral intake  HD (MWF) per nephrology  Continue home medications  Tobacco cessation counseled.  Refused nicotine patch     Nephrology consult     DNR      VTE Risk Mitigation (From admission, onward)         Ordered     IP VTE HIGH RISK PATIENT  Once         01/24/22 1955     Place sequential compression device  Until discontinued         01/24/22 1955     warfarin (COUMADIN) tablet 5 mg  Daily         01/24/22 1652                        Patient care time was spent personally by me on the following activities: > 35 min  · Obtaining a history.  · Examination of patient.  · Providing medical care at the patients bedside.  · Developing a treatment plan with patient or surrogate and bedside caregivers.  · Ordering and reviewing laboratory studies, radiographic studies, pulse oximetry.  · Ordering and performing treatments and interventions.  · Evaluation of patient's response to treatment.  · Discussions with consultants while on the unit and immediately available to the patient.  · Re-evaluation of the patient's condition.  · Documentation in the medical record.       Department Hospital Medicine  [facility]  Jhoan Fuentes MD    Please note: This note was transcribed using voice  recognition software. Because of this technology, there are often uinintended grammatical, spelling, and other transcription errors. Please disregard these errors.

## 2022-01-25 NOTE — NURSING
Mrs Neely glucose at 1954 yesterday was 46.  Dr Fuentes called.  Order noted D50W given IVP.  Glucose rechecked 2152 & was 72.  This AM lab called 0600 with glucose result 46.  Dr Thomas called.  D50W given IVP.  D5LR started at 50 ml hour.  0700 glucose rechecked & was 110.

## 2022-01-25 NOTE — NURSING
Patient arrived on unit via stretcher AAOx4 resp even and unlabored, O2 via NC in progress. IVF's infusing via pump no redness to IV site. >

## 2022-01-25 NOTE — PLAN OF CARE
Important Message from Medicare was sign, explained and given to patient/caregiver on 01/25/2022 at 8:39am     addressed any questions or concerns.    Important Message from Medicare document will be scanned into patient's medical record

## 2022-01-25 NOTE — PLAN OF CARE
Problem: Adult Inpatient Plan of Care  Goal: Plan of Care Review  Outcome: Ongoing, Progressing  Goal: Patient-Specific Goal (Individualized)  Outcome: Ongoing, Progressing  Goal: Absence of Hospital-Acquired Illness or Injury  Outcome: Ongoing, Progressing  Goal: Optimal Comfort and Wellbeing  Outcome: Ongoing, Progressing  Goal: Readiness for Transition of Care  Outcome: Ongoing, Progressing     Problem: Fall Injury Risk  Goal: Absence of Fall and Fall-Related Injury  Outcome: Ongoing, Progressing     Problem: Infection (Pneumonia)  Goal: Resolution of Infection Signs and Symptoms  Outcome: Ongoing, Progressing

## 2022-01-25 NOTE — PLAN OF CARE
[facility]  Initial Discharge Assessment       Primary Care Provider: Kalie Neely MD    Admission Diagnosis: Acute pancreatitis [K85.90]    Admission Date: 1/24/2022  Expected Discharge Date: 1/26/2022    Discharge Barriers Identified: None    Payor: [employer] MANAGED MEDICARE / Plan: [employer] MEDICARE HMO / Product Type: Capitation /     Extended Emergency Contact Information  Primary Emergency Contact: Aide Watson  Address: [address] [place] [address] [place]  Mobile Phone: [phone]  Relation: Sister  Secondary Emergency Contact: Elvi Hi  Address: UNKNOWN   [place]  Mobile Phone: [phone]  Relation: Sister    Discharge Plan A: Home Health  Discharge Plan B: Home Health      [id number] - [place], [place] - [address]  [address]  [facility] [place] [address]  Phone: [phone] Fax: [phone]    [facility] - [facility] [address]  [address] [facility] [address]  Phone: [phone] Fax: [phone]    [facility]  [address]  [facility] [address]  Phone: [phone] Fax: [phone]      Initial Assessment (most recent)     Adult Discharge Assessment - 01/25/22 1142        Discharge Assessment    Assessment Type Discharge Planning Assessment     Confirmed/corrected address, phone number and insurance Yes     Confirmed Demographics Correct on Facesheet     Source of Information patient;family     When was your last doctors appointment? --   unknown    Does patient/caregiver understand observation status Yes     Communicated SIENNA with patient/caregiver Date not available/Unable to determine     Reason For Admission Acute pancreatitis     Lives With sibling(s);other relative(s)     Do you expect to return to your current living situation? Yes     Do you have help at home or someone to help you manage your care at home? Yes     Who are your  caregiver(s) and their phone number(s)? Aide Watson sister [phone]     Prior to hospitilization cognitive status: Alert/Oriented     Current cognitive status: Alert/Oriented     Walking or Climbing Stairs Difficulty ambulation difficulty, requires equipment;stair climbing difficulty, requires equipment;transferring difficulty, requires equipment     Dressing/Bathing Difficulty bathing difficulty, requires equipment;dressing difficulty, requires equipment     Home Layout Able to live on 1st floor     Equipment Currently Used at Home cane, quad;shower chair;glucometer     Readmission within 30 days? No     Patient currently being followed by outpatient case management? No     Do you currently have service(s) that help you manage your care at home? Yes     [facility] [phone] (nursing)     Is the pt/caregiver preference to resume services with current agency Yes     Do you take prescription medications? Yes     Do you have prescription coverage? Yes     Coverage [employer]     Do you have any problems affording any of your prescribed medications? TBD     Is the patient taking medications as prescribed? yes     Who is going to help you get home at discharge? Aide Watson, sister [phone]     How do you get to doctors appointments? family or friend will provide     Are you on dialysis? Yes     Dialysis Name and Scheduled days [facility] MWF @ 0630     Do you take coumadin? Yes     Who monitors your labs? [facility] (pt sees Dr Jade)     Discharge Plan A Home Health     Discharge Plan B Home Health     DME Needed Upon Discharge  none     Discharge Plan discussed with: Sibling;Patient     Name(s) and Number(s) Aide Watson sister [phone]     Discharge Barriers Identified None        Relationship/Environment    Name(s) of Who Lives With Patient sister Ryan [phone], nephew, and brother

## 2022-01-26 NOTE — PLAN OF CARE
01/26/22 0735   Patient Assessment/Suction   Level of Consciousness (AVPU) alert   Respiratory Effort Normal;Unlabored   Expansion/Accessory Muscles/Retractions no use of accessory muscles;no retractions   All Lung Fields Breath Sounds diminished   PRE-TX-O2   O2 Device (Oxygen Therapy) nasal cannula   $ Is the patient on Low Flow Oxygen? Yes   Flow (L/min) 4  (decreased from 5L)   SpO2 99 %   Pulse Oximetry Type Intermittent   $ Pulse Oximetry - Multiple Charge Pulse Oximetry - Multiple   Pulse 86   Resp 16   Aerosol Therapy   $ Aerosol Therapy Charges PRN treatment not required   Education   $ Education Bronchodilator;15 min   Respiratory Evaluation   $ Care Plan Tech Time 15 min   $ Eval/Re-eval Charges Re-evaluation

## 2022-01-26 NOTE — NURSING
2300 IV infiltrated, discontinued.  Dr Felder attempted to start IV.  Stuck her one time, did not get IV & she refused to let him try again.  Dr Felder offered to insert a central line IV but she refused.

## 2022-01-26 NOTE — NURSING
Dr Fuentes on patient status of low blood glucose, low b/p and patient being non-compliant. Dr Fuentes present and rounded on patient. He got patient to drink OJ for low blood glucose.

## 2022-01-26 NOTE — CARE UPDATE
01/25/22 2210   Patient Assessment/Suction   Level of Consciousness (AVPU) alert   Respiratory Effort Unlabored   Expansion/Accessory Muscles/Retractions expansion symmetric;no retractions;no use of accessory muscles   All Lung Fields Breath Sounds diminished   Rhythm/Pattern, Respiratory no shortness of breath reported;pattern regular;unlabored   PRE-TX-O2   O2 Device (Oxygen Therapy) nasal cannula   $ Is the patient on Low Flow Oxygen? Yes   Flow (L/min) 4   SpO2 100 %   Pulse Oximetry Type Intermittent   $ Pulse Oximetry - Multiple Charge Pulse Oximetry - Multiple   Pulse 69   Resp 18   Aerosol Therapy   $ Aerosol Therapy Charges PRN treatment not required   Respiratory Interventions   Breathing Techniques/Airway Clearance diaphragmatic breathing promoted   Education   $ Education Bronchodilator;Other (see comment);15 min  (prn tx,deep diaphragmatic breathing exercises)   Respiratory Evaluation   $ Care Plan Tech Time 15 min   $ Eval/Re-eval Charges Re-evaluation   Evaluation For   (care plan)

## 2022-01-26 NOTE — NURSING
1900 Patient IV infiltrated, discontinued.  Patient last fingerstick glucose 46.  Unable to give D50W IV due to loss of IV.  Dr Fuentes called.  Glucose tablets ordered.  Patient refused glucose tablets.  Tried getting her to eat jello but she stated it made her stomach hurt.  She said she would drink ice tea but when I tried to give her the tea she refused it.  # different nurses tried to get an IV started but were unable to.  Patient brought to the ED where an IV was started with ultrasound.  D50W given IV push.

## 2022-01-26 NOTE — TREATMENT PLAN
Nephrology Dialysis Treatment Note    Griselda Neely    Patient seen and evaluated on hemodialysis session.  Currently tolerating prescription.  Patient normotensive on dialysis and meeting ultrafiltration goals.  No issues with current access.  Arterial and venous pressures within normal limits.  Patient with no current complaints during dialysis treatment.    Prescription and modifications reviewed with HD nurse at bedside.  Continue treatment as prescribed.  UF to goal.    NAD  NRIR, no rubs  CTABL, no IWB, on home O2  Soft, mild TTP over epigastric space  No appreciated edema, ROM intact     ml/min,  ml/min. K bath 4 mEq/L.     Ramiro Butler MD

## 2022-01-26 NOTE — PROGRESS NOTES
01/26/22 1430   Post-Hemodialysis Assessment   Rinseback Volume (mL) 250 mL   Blood Volume Processed (Liters) 60 L   Dialyzer Clearance Lightly streaked   Duration of Treatment (minutes) 180 minutes   Hemodialysis Intake (mL) 500 mL   Total UF (mL) 1000 mL   Net Fluid Removal 1500   Patient Response to Treatment tolerated well   Post-Treatment Weight 56.7 kg (125 lb)   Treatment Weight Change -1   Arterial bleeding stop time (min) 6 min   Venous bleeding stop time (min) 6 min   Post-Hemodialysis Comments no problems

## 2022-01-26 NOTE — CONSULTS
Formerly [facility], [facility]  Consult Note  Nephrology    Patient Name: Griselda Neely  Age: female 76 y.o. 1945  Consult Requested By: Jhoan Fuentes MD  Reason for Consult: ESRD on iHD evaluation and management    SUBJECTIVE:     History of Present Illness:  Griselda Neely is a 76 y.o. female with pertinent medical history of hypertension, hyperlipidemia, peripheral vascular disease, chronic biventricular heart failure, history of bioprosthetic mitral valve, gout, history calciphylaxis, COPD on home O2, chronic tobacco use, atrial fibrillation on anticoagulation with warfarin, h/o pancreatitis, and ESRD on MWF schedule at [facility]. Presented to ED on 1/24/22 with 6 days of epigastric pain. She was found to have acute episode of pancreatitis. She was previously evaluated the day before with similar complaint, however refused admission and further workup. She reports she has had epigastric pain and nausea x6 days and has been unable to tolerate PO intake for last 3 days. Her last iHD session was on Monday, however she did not go to treatment on prior Friday when pain began. Since admission, she has been on bowel rest, and receiving low rate IV fluids. Patient reports that her symptoms are improving and hopes to advance diet soon. Nephrology consulted for ESRD on iHD evaluation and management.    Patient denies any recent issues with her RUE AVF. Denies access alarms on last dialysis session. Tolerating iHD sessions in clinic.    Review Of Systems:  Review of Systems   Constitutional: Positive for weight loss. Negative for chills and fever.   HENT: Negative for congestion, ear pain and sore throat.    Eyes: Negative for blurred vision and pain.   Respiratory: Negative for cough, shortness of breath and wheezing.    Cardiovascular: Negative for chest pain, palpitations, orthopnea and leg swelling.   Gastrointestinal: Positive for abdominal pain and nausea. Negative for constipation, diarrhea and  heartburn.   Genitourinary:        Anuric at baselinea   Musculoskeletal: Negative for back pain, joint pain and myalgias.   Skin: Negative for rash.   Neurological: Negative for tingling, sensory change, focal weakness, weakness and headaches.   Endo/Heme/Allergies: Negative for polydipsia.   Psychiatric/Behavioral: Negative for depression and substance abuse.         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    both legs    CHF (congestive heart failure)     Encounter for blood transfusion 03/2016    Gout     Hemodialysis access, AV graft     Hypertension     Mitral valve regurgitation     Osteoarthritis     Pancreatitis     Peripheral vascular disease     Pneumonia 09/09/2017    Renal failure      Past Surgical History:   Procedure Laterality Date    ACHILLES TENDON SURGERY Right     ANGIOGRAPHY OF ARTERIOVENOUS SHUNT Right 1/7/2020    Procedure: Fistulogram with Possible Intervention;  Surgeon: Joseph Loyola MD;  Location: [facility] CATH/EP LAB;  Service: Cardiology;  Laterality: Right;    ANGIOGRAPHY OF LOWER EXTREMITY Left 3/18/2020    Procedure: Angiogram Extremity Unilateral;  Surgeon: Ali Khoobehi, MD;  Location: [facility] CATH/EP LAB;  Service: Cardiology;  Laterality: Left;    APPENDECTOMY      CARDIAC CATHETERIZATION  07/03/2017    CHOLECYSTECTOMY      COLONOSCOPY Left 10/4/2020    Procedure: COLONOSCOPY;  Surgeon: Jordan Kilpatrick MD;  Location: [facility];  Service: Endoscopy;  Laterality: Left;    ESOPHAGOGASTRODUODENOSCOPY Left 8/17/2020    Procedure: EGD (ESOPHAGOGASTRODUODENOSCOPY);  Surgeon: Talat Trevizo MD;  Location: [facility];  Service: Endoscopy;  Laterality: Left;    ESOPHAGOGASTRODUODENOSCOPY Left 10/2/2020    Procedure: EGD (ESOPHAGOGASTRODUODENOSCOPY);  Surgeon: Talat Trevizo MD;  Location: [facility];  Service: Endoscopy;  Laterality: Left;    ESOPHAGOGASTRODUODENOSCOPY N/A 6/29/2021    Procedure: EGD  (ESOPHAGOGASTRODUODENOSCOPY);  Surgeon: Sudheer Brown III, MD;  Location: [facility] ENDO;  Service: Endoscopy;  Laterality: N/A;    heal surgery Right     HYSTERECTOMY      INSERTION OF STENT INTO PERIPHERAL VESSEL N/A 1/7/2020    Procedure: INSERTION, STENT, VESSEL, PERIPHERAL;  Surgeon: Joseph Loyola MD;  Location: [facility] CATH/EP LAB;  Service: Cardiology;  Laterality: N/A;    MITRAL VALVE REPLACEMENT      PARATHYROIDECTOMY      PARATHYROIDECTOMY  07/13/2017    PERCUTANEOUS TRANSLUMINAL ANGIOPLASTY OF ARTERIOVENOUS FISTULA N/A 1/7/2020    Procedure: PTA, AV FISTULA;  Surgeon: Joseph Loyola MD;  Location: [facility] CATH/EP LAB;  Service: Cardiology;  Laterality: N/A;    PERITONEAL CATHETER INSERTION      RENAL BIOPSY      SMALL BOWEL ENTEROSCOPY N/A 12/2/2020    Procedure: ENTEROSCOPY;  Surgeon: Sudheer Borwn III, MD;  Location: [facility] ENDO;  Service: Endoscopy;  Laterality: N/A;    vocal cord nodule      WOUND DEBRIDEMENT Left 7/13/2020    Procedure: DEBRIDEMENT, WOUND;  Surgeon: Carlos Elam III, MD;  Location: [facility] OR;  Service: General;  Laterality: Left;     Family History   Problem Relation Age of Onset    Heart disease Sister     Early death Sister         heart as baby    Heart disease Maternal Grandfather     Diabetes Mother     Hypertension Mother     Heart failure Mother     Heart disease Mother     Arthritis Mother     Diabetes Father     Early death Sister         infant     Social History     Tobacco Use    Smoking status: Current Some Day Smoker     Packs/day: 0.50     Years: 45.00     Pack years: 22.50     Types: Cigarettes    Smokeless tobacco: Never Used   Substance Use Topics    Alcohol use: No    Drug use: No      Review of patient's allergies indicates:  No Known Allergies   Prior to Admission medications    Medication Sig Start Date End Date Taking? Authorizing Provider   albuterol (ACCUNEB) 1.25 mg/3 mL Nebu Take 1.25 mg by nebulization every 6 (six) hours  as needed. Rescue  HCS   Yes Historical Provider   albuterol (PROVENTIL/VENTOLIN HFA) 90 mcg/actuation inhaler Inhale 1 puff into the lungs every 4 (four) hours as needed for Wheezing. rescue 12/11/20  Yes Historical Provider   aspirin (ECOTRIN) 81 MG EC tablet Take 81 mg by mouth once daily.   Yes Historical Provider   calcium acetate (PHOSLO) 667 mg capsule Take 667 mg by mouth once daily.    Yes Historical Provider   diltiaZEM (CARDIZEM) 30 MG tablet Take 1 tablet (30 mg total) by mouth 4 (four) times daily before meals and nightly. 10/14/21 10/14/22 Yes Gabe Davis MD   hydrALAZINE (APRESOLINE) 50 MG tablet Take 1 tablet (50 mg total) by mouth every 8 (eight) hours. 12/31/20 12/31/21 Yes Kalie Neely MD   ondansetron (ZOFRAN) 4 MG tablet Take 1 tablet (4 mg total) by mouth every 6 (six) hours as needed for Nausea. 1/23/22  Yes Erna Cartagena MD   oxyCODONE-acetaminophen (PERCOCET) 5-325 mg per tablet Take 1 tablet by mouth every 4 (four) hours as needed for Pain. 1/23/22  Yes Erna Cartagena MD   warfarin (COUMADIN) 5 MG tablet Take 1 tablet (5 mg total) by mouth Daily. 11/20/21 11/20/22 Yes Jhoan Fuentes MD   isosorbide mononitrate (IMDUR) 30 MG 24 hr tablet Take 1 tablet (30 mg total) by mouth once daily. 1/25/22   Tosha Franklin PA-C   levalbuterol (XOPENEX) 0.63 mg/3 mL nebulizer solution Take 3 mLs (0.63 mg total) by nebulization every 8 (eight) hours as needed for Wheezing or Shortness of Breath. Rescue 12/31/20 12/31/21  Kalie Neely MD   losartan (COZAAR) 25 MG tablet Take 1 tablet (25 mg total) by mouth every evening. 1/25/22   Tosha Franklin PA-C   metoprolol succinate (TOPROL-XL) 25 MG 24 hr tablet Take 1 tablet by mouth nightly 1/25/22   Mary Do, KANE   pantoprazole (PROTONIX) 40 MG tablet Take 1 tablet by mouth once daily 1/25/22   Mary Do NP           dextrose 5% lactated ringers 50 mL/hr at 01/25/22 0623       aspirin  81 mg Oral Daily     calcium acetate(phosphat bind)  667 mg Oral Daily    diltiaZEM  30 mg Oral QID (AC & HS)    isosorbide mononitrate  30 mg Oral Daily    losartan  25 mg Oral QHS    metoprolol succinate  25 mg Oral Nightly    mupirocin   Nasal BID    pantoprazole  40 mg Oral Daily    warfarin  5 mg Oral Daily     acetaminophen, acetaminophen, albuterol-ipratropium, dextrose 50%, dextrose 50%, glucose, HYDROmorphone, insulin regular, melatonin, metoprolol, naloxone, ondansetron, oxyCODONE-acetaminophen, polyethylene glycol, prochlorperazine, simethicone, sodium chloride 0.9%       OBJECTIVE:     Vital Signs (Most Recent)  Temp: 98.6 °F (37 °C) (01/26/22 1110)  Pulse: 68 (01/26/22 1430)  Resp: 18 (01/26/22 1430)  BP: (!) 108/55 (01/26/22 1430)  SpO2: 99 % (01/26/22 1110)    Vital Signs Range (Last 24H):  Temp:  [97.7 °F (36.5 °C)-99.1 °F (37.3 °C)]   Pulse:  [59-99]   Resp:  [16-21]   BP: ()/(55-87)   SpO2:  [92 %-100 %]     I/O:    Intake/Output Summary (Last 24 hours) at 1/26/2022 1505  Last data filed at 1/26/2022 1430  Gross per 24 hour   Intake 500 ml   Output 1000 ml   Net -500 ml        Physical Exam:  Physical Exam  Vitals reviewed.   Constitutional:       General: She is not in acute distress.     Appearance: Normal appearance. She is normal weight. She is not ill-appearing.   HENT:      Head: Normocephalic and atraumatic.      Mouth/Throat:      Mouth: Mucous membranes are dry.      Pharynx: Oropharynx is clear. No oropharyngeal exudate.   Eyes:      Extraocular Movements: Extraocular movements intact.   Neck:      Vascular: No carotid bruit.   Cardiovascular:      Rate and Rhythm: Normal rate. Rhythm irregular.      Pulses: Normal pulses.      Heart sounds: No murmur heard.  No friction rub.   Pulmonary:      Effort: Pulmonary effort is normal.      Breath sounds: No rales.   Abdominal:      General: Abdomen is flat.      Palpations: Abdomen is soft.      Tenderness: There is abdominal tenderness (tender to  light palpation).   Musculoskeletal:         General: No swelling.      Cervical back: Normal range of motion.      Right lower leg: No edema.      Left lower leg: No edema.   Skin:     General: Skin is warm and dry.      Capillary Refill: Capillary refill takes less than 2 seconds.   Neurological:      General: No focal deficit present.      Mental Status: She is oriented to person, place, and time.   Psychiatric:         Mood and Affect: Mood normal.         Behavior: Behavior normal.     RUE AVF with adequate thrill, +bruit      Laboratory:  Recent Labs   Lab 01/24/22  1329 01/25/22  0450 01/26/22 0453   * 134* 132*   K 3.7 3.9 3.9   CL 86* 90* 87*   CO2 27 24 27   BUN 26* 34* 38*   CREATININE 4.9* 5.7* 7.0*   GLU 52* 46* 68*   CALCIUM 8.4* 8.2* 7.9*   PHOS  --  5.9* 6.7*       Recent Labs   Lab 01/24/22  1329 01/25/22 0450 01/26/22 0453   WBC 5.01 4.95 3.82*   HGB 13.0 13.0 13.1   HCT 39.9 42.0 41.7   * 102* 73*       Anemia Evaluation  Lab Results   Component Value Date    HGB 13.1 01/26/2022    MCV 91 01/26/2022    FERRITIN 606 (H) 11/15/2020    FOLATE 4.5 08/08/2019    KSQLKCRV15 1045 (H) 08/08/2019       CKD-MBD Evaluation  Lab Results   Component Value Date    [id number] <13 (L) 05/30/2016    CALCIUM 7.9 (L) 01/26/2022    PHOS 6.7 (H) 01/26/2022    MG 1.7 01/26/2022    CO2 27 01/26/2022    ALBUMIN 3.6 01/24/2022       Imaging:  Imaging Results          CT Abdomen Pelvis  Without Contrast (Final result)  Result time 01/24/22 14:01:31    Final result by Harish Liu MD (01/24/22 14:01:31)                 Narrative:    CMS MANDATED QUALITY DATA - CT RADIATION  436    All CT scans at this facility utilize dose modulation, iterative reconstruction, and/or weight based dosing when appropriate to reduce radiation dose to as low as reasonably achievable.    CT ABDOMEN PELVIS WITHOUT IV CONTRAST    CLINICAL HISTORY:  76 years Female Abdominal pain, acute, nonlocalized    COMPARISON: CT abdomen  and pelvis January 23, 2022    FINDINGS: Images through the lower thorax demonstrate moderate right pleural fluid (which appears at least partially loculated) and small volume left pleural fluid. Bibasilar atelectasis/scarring.    Bone window images show no acute or aggressive osseous abnormality.    No focal hepatic lesion identified. Gallbladder is contracted or absent. Perihepatic fluid. Spleen is normal in size. Perisplenic fluid. Peripancreatic inflammatory stranding is evident, similar to prior, suggestive of acute pancreatitis. No adrenal lesion. Bilateral renal atrophy. Ureters are normal in caliber. Urinary bladder is collapsed.    No evidence of acute pathology involving the gastrointestinal tract. Distal colonic diverticula. Small volume free fluid within the abdomen and pelvis.    No free intraperitoneal air. No pathologically enlarged abdominal or pelvic lymph nodes. Aortoiliac atherosclerotic calcification.    IMPRESSION:    Peripancreatic inflammatory stranding suggesting acute pancreatitis.    Free fluid within the abdomen and pelvis, similar to prior.    Partially loculated right pleural effusion.    Diverticulosis.    Aortoiliac atherosclerosis.    Electronically signed by:  Harish Liu MD  1/24/2022 2:01 PM [facility] Workstation: [id number]                             X-Ray Chest AP Portable (Final result)  Result time 01/24/22 13:03:15   Procedure changed from X-Ray Chest 1 View     Final result by Aidan Mullen MD (01/24/22 13:03:15)                 Narrative:    Chest single view    CLINICAL DATA: Nausea, tachycardia    FINDINGS: AP view is compared to January 22.    The heart is enlarged. The aortic arch is calcified. Prosthetic mitral valve is noted. Prominence of the pulmonary vasculature and faint bilateral groundglass opacities are unchanged, with asymmetric airspace disease at the right lung base also stable. Small right pleural effusion is unchanged. There is no  pneumothorax.    IMPRESSION:  1. No significant interval change compared to January 22.    Electronically signed by:  Aidan Mullen MD  1/24/2022 1:03 PM CST Workstation: [id number]                                ASSESSMENT/PLAN:     Active Hospital Problems    Diagnosis  POA    *Acute pancreatitis [K85.90]  Yes    History of pancreatitis [Z87.19]  Not Applicable     Chronic    Chronic hypoxemic respiratory failure [J96.11]  Yes     Chronic     4 L      Diverticulosis [K57.90]  Yes     Chronic    Atrial fibrillation [I48.91]  Yes     Chronic    Long term current use of anticoagulant [Z79.01]  Not Applicable     Chronic    Chronic systolic heart failure [I50.22]  Yes     Chronic    HLD (hyperlipidemia) [E78.5]  Yes     Chronic    PVD (peripheral vascular disease) [I73.9]  Yes     Chronic    DNR (do not resuscitate) [Z66]  Yes     Chronic    COPD (chronic obstructive pulmonary disease) [J44.9]  Yes     Chronic    Pulmonary hypertension [I27.20]  Yes     Chronic    ESRD (end stage renal disease) on HD M,W, F [N18.6]  Yes     Chronic    HTN (hypertension) [I10]  Yes     Chronic      Resolved Hospital Problems   No resolved problems to display.     #ESRD on iHD  MWF schedule at outpatient iHD unit.   Will plan for iHD today  Next session for 1/28/22 unless indication arises tomorrow    #acute pancreatitis  Lipase mildly elevated  Clinically improving with supportive care  Low rate IVF due to BiV HF and pulm hypertension  Can stop fluids when diet advanced    #hypertension  Diltizaem 30mg daily  Imdur 30mg daily  Losartan 25mg daily  Toprol XL 25mg  Monitor with UF    #chronic biventricular HF  #pulmonary hypertension  Chronically elevated BNP  Monitoring with UF  Appears clinically compensated currently    #CKD/MBD/nutrition  Continue home binders    Thank you for allowing us to consult on Griselda Neely.  Our group will follow-up. Patient can return to iHD unit for nephrology follow up at time of  hospital discharge.    Ramiro Butler MD  [facility] Nephrology  01/26/2022

## 2022-01-26 NOTE — PROGRESS NOTES
Critical access hospital Medicine  Progress Note    Patient name: Griselda Neely  MRN: [id number]  Admit Date: 1/24/2022   LOS: 2 days     SUBJECTIVE:     Principal problem: Acute pancreatitis    Interval History:  Patient lost IV access overnight and has been a difficult stick. Plan for US guided midline placement. Feels better today. First day of feeling hungry. Does not want clear liquid diet. Advance to full liquid diet. Encourage patient to increase oral intake.     Hospital course  Patient was admitted for acute pancreatitis.  Patient was placed on bowel rest, pain control, and IV fluids. Symptoms improved and patient started on a diet.    Scheduled Meds:   aspirin  81 mg Oral Daily    calcium acetate(phosphat bind)  667 mg Oral Daily    diltiaZEM  30 mg Oral QID (AC & HS)    isosorbide mononitrate  30 mg Oral Daily    losartan  25 mg Oral QHS    metoprolol succinate  25 mg Oral Nightly    mupirocin   Nasal BID    pantoprazole  40 mg Oral Daily    warfarin  5 mg Oral Daily     Continuous Infusions:   dextrose 5% lactated ringers 50 mL/hr at 01/25/22 0623     PRN Meds:acetaminophen, acetaminophen, albuterol-ipratropium, dextrose 50%, dextrose 50%, glucose, HYDROmorphone, insulin regular, melatonin, metoprolol, naloxone, ondansetron, oxyCODONE-acetaminophen, polyethylene glycol, prochlorperazine, simethicone, sodium chloride 0.9%    Review of patient's allergies indicates:  No Known Allergies    Review of Systems  As per subjective    OBJECTIVE:     Vital Signs (Most Recent)  Temp: 98.6 °F (37 °C) (01/26/22 1110)  Pulse: 65 (01/26/22 1230)  Resp: (!) 21 (01/26/22 1110)  BP: 102/76 (01/26/22 1230)  SpO2: 99 % (01/26/22 1110)    Vital Signs Range (Last 24H):  Temp:  [97.7 °F (36.5 °C)-99.1 °F (37.3 °C)]   Pulse:  [59-99]   Resp:  [16-21]   BP: ()/(65-87)   SpO2:  [92 %-100 %]     I & O (Last 24H):  No intake or output data in the 24 hours ending 01/26/22 1251    Physical Exam:  General:  Patient resting comfortably in no acute distress. Appears as stated age. Calm. Thin-appearing  Eyes: EOM intact. No conjunctivae injection. No scleral icterus.  ENT: Hearing grossly intact. No discharge from ears. No nasal discharge.   CVS: IRR. No LE edema BL.  Lungs: CTA BL, no wheezing or crackles. Good breath sounds. No accessory muscle use. No acute respiratory distress  Ab: Mild epigastric pain  Neuro: Alert. Cranial nerves grossly intact. Moves all extremities equally. Follows commands. Responds appropriately   Psych: Mood, behavior, thought content and judgement normal    Laboratory:  All pertinent labs within the past 24 hours have been reviewed.  CBC:   Recent Labs   Lab 01/24/22  1329 01/25/22  0450 01/26/22 0453   WBC 5.01 4.95 3.82*   HGB 13.0 13.0 13.1   HCT 39.9 42.0 41.7   * 102* 73*     CMP:   Recent Labs   Lab 01/24/22  1329 01/25/22  0450 01/26/22 0453   * 134* 132*   K 3.7 3.9 3.9   CL 86* 90* 87*   CO2 27 24 27   GLU 52* 46* 68*   BUN 26* 34* 38*   CREATININE 4.9* 5.7* 7.0*   CALCIUM 8.4* 8.2* 7.9*   PROT 7.7  --   --    ALBUMIN 3.6  --   --    BILITOT 1.7*  --   --    ALKPHOS 86  --   --    AST 49*  --   --    ALT 19  --   --    ANIONGAP 22* 20* 18*   EGFRNONAA 8.1* 6.7* 5.3*       Microbiology Results (last 7 days)     Procedure Component Value Units Date/Time    Blood culture #1 **CANNOT BE ORDERED STAT** [[id number]] Collected: 01/24/22 1330    Order Status: Completed Specimen: Blood from Peripheral, Wrist, Left Updated: 01/25/22 1432     Blood Culture, Routine No Growth to date      No Growth to date    Blood culture #2 **CANNOT BE ORDERED STAT** [[id number]] Collected: 01/24/22 1330    Order Status: Completed Specimen: Blood from Peripheral, Antecubital, Left Updated: 01/25/22 1432     Blood Culture, Routine No Growth to date      No Growth to date           Diagnostic Results:      ASSESSMENT/PLAN:     Active Hospital Problems    Diagnosis  POA    *Acute pancreatitis  [K85.90]  Yes    History of pancreatitis [Z87.19]  Not Applicable     Chronic    Chronic hypoxemic respiratory failure [J96.11]  Yes     Chronic     4 L      Diverticulosis [K57.90]  Yes     Chronic    Atrial fibrillation [I48.91]  Yes     Chronic    Long term current use of anticoagulant [Z79.01]  Not Applicable     Chronic    Chronic systolic heart failure [I50.22]  Yes     Chronic    HLD (hyperlipidemia) [E78.5]  Yes     Chronic    PVD (peripheral vascular disease) [I73.9]  Yes     Chronic    DNR (do not resuscitate) [Z66]  Yes     Chronic    COPD (chronic obstructive pulmonary disease) [J44.9]  Yes     Chronic    Pulmonary hypertension [I27.20]  Yes     Chronic    ESRD (end stage renal disease) on HD M,W, F [N18.6]  Yes     Chronic    HTN (hypertension) [I10]  Yes     Chronic      Resolved Hospital Problems   No resolved problems to display.           Plan:   Pain control  Full liquid diet  HD (MWF) per nephrology  Midline order placed  INR daily  Continue home medications  Tobacco cessation counseled.  Refused nicotine patch     Nephrology consult     DNR      VTE Risk Mitigation (From admission, onward)         Ordered     IP VTE HIGH RISK PATIENT  Once         01/24/22 1955     Place sequential compression device  Until discontinued         01/24/22 1955     warfarin (COUMADIN) tablet 5 mg  Daily         01/24/22 1652                        Patient care time was spent personally by me on the following activities: > 35 min  · Obtaining a history.  · Examination of patient.  · Providing medical care at the patients bedside.  · Developing a treatment plan with patient or surrogate and bedside caregivers.  · Ordering and reviewing laboratory studies, radiographic studies, pulse oximetry.  · Ordering and performing treatments and interventions.  · Evaluation of patient's response to treatment.  · Discussions with consultants while on the unit and immediately available to the patient.  · Re-evaluation  of the patient's condition.  · Documentation in the medical record.       Department Hospital Medicine  [facility]  Jhoan Fuentes MD    Please note: This note was transcribed using voice recognition software. Because of this technology, there are often uinintended grammatical, spelling, and other transcription errors. Please disregard these errors.

## 2022-01-27 NOTE — NURSING
Pt tolerated lunch, Iv removed cath intact, dc instructions reviewed by pt. Son to drive home pt wheeled out with 4l o2nc.

## 2022-01-27 NOTE — DISCHARGE SUMMARY
[facility] Medicine  Discharge Summary      Patient Name: Griselda Neely  MRN: [id number]  Patient Class: IP- Inpatient  Admission Date: 1/24/2022  Hospital Length of Stay: 3 days  Discharge Date and Time:  01/27/2022 4:13 PM  Attending Physician: Lily att. providers found   Discharging Provider: David Gaytan MD  Primary Care Provider: Kalie Neely MD      HPI:   HPI per patient and per patient's family is at bedside  76-year-old female with COPD on 4 L, current tobacco smoker, history of pancreatitis, atrial fibrillation on warfarin, mitral replacement,  ESRD (MWF), hypertension, hyperlipidemia, pulmonary hypertension, PVD, diverticulosis, DNR comes in for epigastric pain.  Patient was seen in the ED yesterday and was diagnosed with acute pancreatitis and was recommended to be admitted however patient refused.  Patient reports that she developed epigastric pain about 6 days ago.  Describes pain as sharp nonradiating severe pain.  Reported that she had decreased appetite.  States that she was unable to eat since about 3 days ago.  Denies fever, chills, chest pain, shortness of breath.  Patient reported the pain was persistent today and cannot tolerate anymore and came to the ED for further evaluation.      * No surgery found *      Hospital Course:     HPI: HPI per patient and per patient's family is at bedside  76-year-old female with COPD on 4 L, current tobacco smoker, history of pancreatitis, atrial fibrillation on warfarin, mitral replacement,  ESRD (MWF), hypertension, hyperlipidemia, pulmonary hypertension, PVD, diverticulosis, DNR comes in for epigastric pain.  Patient was seen in the ED yesterday and was diagnosed with acute pancreatitis and was recommended to be admitted however patient refused.  Patient reports that she developed epigastric pain about 6 days ago.  Describes pain as sharp nonradiating severe pain.  Reported that she had decreased appetite.  States that she was  unable to eat since about 3 days ago.  Denies fever, chills, chest pain, shortness of breath.  Patient reported the pain was persistent today and cannot tolerate anymore and came to the ED for further evaluation.    Hospital course  Patient was admitted with mild pancreatitis.  Lipase level was minimally elevated . He is a dialysis patient and she got dialysis during the hospital stay.  Later patient tolerated to the diet and discharged in stable condition to follow with primary care doctor as outpatient.       Goals of Care Treatment Preferences:  Code Status: DNR      Consults:   Consults (From admission, onward)        Status Ordering Provider     Inpatient consult to Nephrology  Once        Provider:  Joseph Ingram MD    Completed SANDRA VALLECILLO          No new Assessment & Plan notes have been filed under this hospital service since the last note was generated.  Service: Hospital Medicine    Final Active Diagnoses:    Diagnosis Date Noted POA    PRINCIPAL PROBLEM:  Acute pancreatitis [K85.90] 08/15/2013 Yes    ESRD (end stage renal disease) on HD M,W, F [N18.6] 05/11/2016 Yes     Chronic    History of pancreatitis [Z87.19] 01/24/2022 Not Applicable     Chronic    Chronic hypoxemic respiratory failure [J96.11] 11/20/2021 Yes     Chronic    Diverticulosis [K57.90] 11/20/2021 Yes     Chronic    Atrial fibrillation [I48.91] 10/13/2021 Yes     Chronic    Long term current use of anticoagulant [Z79.01] 09/05/2020 Not Applicable     Chronic    Chronic systolic heart failure [I50.22] 02/03/2020 Yes     Chronic    HLD (hyperlipidemia) [E78.5] 12/20/2019 Yes     Chronic    PVD (peripheral vascular disease) [I73.9] 12/20/2019 Yes     Chronic    DNR (do not resuscitate) [Z66] 09/30/2019 Yes     Chronic    COPD (chronic obstructive pulmonary disease) [J44.9] 09/29/2019 Yes     Chronic    Pulmonary hypertension [I27.20] 07/25/2017 Yes     Chronic    HTN (hypertension) [I10] 08/15/2013 Yes     Chronic       Problems Resolved During this Admission:       Discharged Condition: good    Disposition: Home or Self Care    Follow Up:   Follow-up Information     Kalie Neely MD In 2 weeks.    Specialty: Family Medicine  Contact information:  [address]  [address]  [place] [address] [phone]             NEPHROLOGY  In 1 week.                     Patient Instructions:      Ambulatory referral/consult to Home Health   Standing Status: Future   Referral Priority: Routine Referral Type: Home Health   Referral Reason: Specialty Services Required   Requested Specialty: Home Health Services   Number of Visits Requested: 1     Diet renal     Diet Cardiac     Activity as tolerated       Significant Diagnostic Studies: Labs:   CMP   Recent Labs   Lab 01/26/22  0453 01/27/22  0651   * 135*   K 3.9 4.0   CL 87* 96   CO2 27 22*   GLU 68* 83   BUN 38* 24*   CREATININE 7.0* 5.4*   CALCIUM 7.9* 8.1*   ANIONGAP 18* 17*   ESTGFRAFRICA 6.1* 8.3*   EGFRNONAA 5.3* 7.2*    and CBC   Recent Labs   Lab 01/26/22 0453 01/26/22 0453 01/27/22  0651   WBC 3.82*  --  3.14*   HGB 13.1  --  12.5   HCT 41.7   < > 41.5   PLT 73*  --  114*    < > = values in this interval not displayed.       Pending Diagnostic Studies:     None         Medications:  Reconciled Home Medications:      Medication List      CONTINUE taking these medications    * albuterol 90 mcg/actuation inhaler  Commonly known as: PROVENTIL/VENTOLIN HFA  Inhale 1 puff into the lungs every 4 (four) hours as needed for Wheezing. rescue     * albuterol 1.25 mg/3 mL Nebu  Commonly known as: ACCUNEB  Take 1.25 mg by nebulization every 6 (six) hours as needed. Rescue  HCS     aspirin 81 MG EC tablet  Commonly known as: ECOTRIN  Take 81 mg by mouth once daily.     calcium acetate(phosphat bind) 667 mg capsule  Commonly known as: PHOSLO  Take 667 mg by mouth once daily.     diltiaZEM 30 MG tablet  Commonly known as: CARDIZEM  Take 1 tablet (30 mg total) by mouth 4 (four) times  daily before meals and nightly.     hydrALAZINE 50 MG tablet  Commonly known as: APRESOLINE  Take 1 tablet (50 mg total) by mouth every 8 (eight) hours.     isosorbide mononitrate 30 MG 24 hr tablet  Commonly known as: IMDUR  Take 1 tablet (30 mg total) by mouth once daily.     losartan 25 MG tablet  Commonly known as: COZAAR  Take 1 tablet (25 mg total) by mouth every evening.     metoprolol succinate 25 MG 24 hr tablet  Commonly known as: TOPROL-XL  Take 1 tablet by mouth nightly     ondansetron 4 MG tablet  Commonly known as: ZOFRAN  Take 1 tablet (4 mg total) by mouth every 6 (six) hours as needed for Nausea.     oxyCODONE-acetaminophen 5-325 mg per tablet  Commonly known as: PERCOCET  Take 1 tablet by mouth every 4 (four) hours as needed for Pain.     pantoprazole 40 MG tablet  Commonly known as: PROTONIX  Take 1 tablet by mouth once daily     warfarin 5 MG tablet  Commonly known as: COUMADIN  Take 1 tablet (5 mg total) by mouth Daily.         * This list has 2 medication(s) that are the same as other medications prescribed for you. Read the directions carefully, and ask your doctor or other care provider to review them with you.            STOP taking these medications    levalbuterol 0.63 mg/3 mL nebulizer solution  Commonly known as: XOPENEX            Indwelling Lines/Drains at time of discharge:   Lines/Drains/Airways     Drain                 Hemodialysis AV Fistula 08/08/19 0214 Right upper arm 903 days              General: Patient resting comfortably in no acute distress. Appears as stated age. Calm  Eyes: EOM intact. No conjunctivae injection. No scleral icterus.  ENT: Hearing grossly intact. No discharge from ears. No nasal discharge.   CVS: RRR. No LE edema BL.  Lungs: CTA BL, no wheezing or crackles. Good breath sounds. No accessory muscle use. No acute respiratory distress  Neuro: non focal , Follows commands. Responds appropriately  Abdomen soft and non tender     Time spent on the discharge of  patient: 35 minutes         David Gaytan MD  Department of Hospital Medicine  [facility]

## 2022-01-27 NOTE — PROGRESS NOTES
[facility]  Progress Note  Nephrology    Patient Name: Griselda Neely  Age: female 76 y.o. 1945  Consult Requested By: David Gaytan MD  Reason for Consult: ESRD on iHD evaluation and management    SUBJECTIVE:     Interval History  Tolerating PO diet - ate entirety of breakfast tray  Abdominal pain improving.  Tolerated iHD yesterday without complaint.       OBJECTIVE:     Vital Signs (Most Recent)  Temp: 98.4 °F (36.9 °C) (01/27/22 0755)  Pulse: 74 (01/27/22 0755)  Resp: 18 (01/27/22 0755)  BP: 106/75 (01/27/22 0755)  SpO2: 100 % (01/27/22 0755)    Vital Signs Range (Last 24H):  Temp:  [97.7 °F (36.5 °C)-98.7 °F (37.1 °C)]   Pulse:  []   Resp:  [16-18]   BP: ()/(55-80)   SpO2:  [92 %-100 %]     I/O:    Intake/Output Summary (Last 24 hours) at 1/27/2022 1212  Last data filed at 1/26/2022 1430  Gross per 24 hour   Intake 500 ml   Output 1000 ml   Net -500 ml        Physical Exam:  Physical Exam  Vitals reviewed.   Constitutional:       General: She is not in acute distress.     Appearance: Normal appearance. She is normal weight. She is not ill-appearing.   HENT:      Head: Normocephalic and atraumatic.      Mouth/Throat:      Mouth: Mucous membranes are dry.      Pharynx: Oropharynx is clear. No oropharyngeal exudate.   Eyes:      Extraocular Movements: Extraocular movements intact.   Neck:      Vascular: No carotid bruit.   Cardiovascular:      Rate and Rhythm: Normal rate. Rhythm irregular.      Pulses: Normal pulses.      Heart sounds: No murmur heard.  No friction rub.   Pulmonary:      Effort: Pulmonary effort is normal.      Breath sounds: No rales.   Abdominal:      General: Abdomen is flat.      Palpations: Abdomen is soft.   Musculoskeletal:         General: No swelling.      Cervical back: Normal range of motion.      Right lower leg: No edema.      Left lower leg: No edema.   Skin:     General: Skin is warm and dry.      Capillary Refill: Capillary refill takes less than 2  seconds.   Neurological:      General: No focal deficit present.      Mental Status: She is oriented to person, place, and time.   Psychiatric:         Mood and Affect: Mood normal.         Behavior: Behavior normal.     RUE AVF with adequate thrill, +bruit      Laboratory:  Recent Labs   Lab 01/25/22 0450 01/26/22  0453 01/27/22  0651   * 132* 135*   K 3.9 3.9 4.0   CL 90* 87* 96   CO2 24 27 22*   BUN 34* 38* 24*   CREATININE 5.7* 7.0* 5.4*   GLU 46* 68* 83   CALCIUM 8.2* 7.9* 8.1*   PHOS 5.9* 6.7* 5.1*       Recent Labs   Lab 01/25/22  0450 01/26/22  0453 01/27/22  0651   WBC 4.95 3.82* 3.14*   HGB 13.0 13.1 12.5   HCT 42.0 41.7 41.5   * 73* 114*       Anemia Evaluation  Lab Results   Component Value Date    HGB 12.5 01/27/2022    MCV 95 01/27/2022    FERRITIN 606 (H) 11/15/2020    FOLATE 4.5 08/08/2019    [id number] 1045 (H) 08/08/2019       CKD-MBD Evaluation  Lab Results   Component Value Date    PKSFMONW20MJ <13 (L) 05/30/2016    CALCIUM 8.1 (L) 01/27/2022    PHOS 5.1 (H) 01/27/2022    MG 1.9 01/27/2022    CO2 22 (L) 01/27/2022    ALBUMIN 3.6 01/24/2022       Imaging:  Imaging Results          CT Abdomen Pelvis  Without Contrast (Final result)  Result time 01/24/22 14:01:31    Final result by Harish Liu MD (01/24/22 14:01:31)                 Narrative:    CMS MANDATED QUALITY DATA - CT RADIATION  436    All CT scans at this facility utilize dose modulation, iterative reconstruction, and/or weight based dosing when appropriate to reduce radiation dose to as low as reasonably achievable.    CT ABDOMEN PELVIS WITHOUT IV CONTRAST    CLINICAL HISTORY:  76 years Female Abdominal pain, acute, nonlocalized    COMPARISON: CT abdomen and pelvis January 23, 2022    FINDINGS: Images through the lower thorax demonstrate moderate right pleural fluid (which appears at least partially loculated) and small volume left pleural fluid. Bibasilar atelectasis/scarring.    Bone window images show no acute or  aggressive osseous abnormality.    No focal hepatic lesion identified. Gallbladder is contracted or absent. Perihepatic fluid. Spleen is normal in size. Perisplenic fluid. Peripancreatic inflammatory stranding is evident, similar to prior, suggestive of acute pancreatitis. No adrenal lesion. Bilateral renal atrophy. Ureters are normal in caliber. Urinary bladder is collapsed.    No evidence of acute pathology involving the gastrointestinal tract. Distal colonic diverticula. Small volume free fluid within the abdomen and pelvis.    No free intraperitoneal air. No pathologically enlarged abdominal or pelvic lymph nodes. Aortoiliac atherosclerotic calcification.    IMPRESSION:    Peripancreatic inflammatory stranding suggesting acute pancreatitis.    Free fluid within the abdomen and pelvis, similar to prior.    Partially loculated right pleural effusion.    Diverticulosis.    Aortoiliac atherosclerosis.    Electronically signed by:  Harish Liu MD  1/24/2022 2:01 PM CST Workstation: [id number]                             X-Ray Chest AP Portable (Final result)  Result time 01/24/22 13:03:15   Procedure changed from X-Ray Chest 1 View     Final result by Aidan Mullen MD (01/24/22 13:03:15)                 Narrative:    Chest single view    CLINICAL DATA: Nausea, tachycardia    FINDINGS: AP view is compared to January 22.    The heart is enlarged. The aortic arch is calcified. Prosthetic mitral valve is noted. Prominence of the pulmonary vasculature and faint bilateral groundglass opacities are unchanged, with asymmetric airspace disease at the right lung base also stable. Small right pleural effusion is unchanged. There is no pneumothorax.    IMPRESSION:  1. No significant interval change compared to January 22.    Electronically signed by:  Aidan Mullen MD  1/24/2022 1:03 PM CST Workstation: [id number]                                ASSESSMENT/PLAN:     Active Hospital Problems    Diagnosis  POA     *Acute pancreatitis [K85.90]  Yes    History of pancreatitis [Z87.19]  Not Applicable     Chronic    Chronic hypoxemic respiratory failure [J96.11]  Yes     Chronic     4 L      Diverticulosis [K57.90]  Yes     Chronic    Atrial fibrillation [I48.91]  Yes     Chronic    Long term current use of anticoagulant [Z79.01]  Not Applicable     Chronic    Chronic systolic heart failure [I50.22]  Yes     Chronic    HLD (hyperlipidemia) [E78.5]  Yes     Chronic    PVD (peripheral vascular disease) [I73.9]  Yes     Chronic    DNR (do not resuscitate) [Z66]  Yes     Chronic    COPD (chronic obstructive pulmonary disease) [J44.9]  Yes     Chronic    Pulmonary hypertension [I27.20]  Yes     Chronic    ESRD (end stage renal disease) on HD M,W, F [N18.6]  Yes     Chronic    HTN (hypertension) [I10]  Yes     Chronic      Resolved Hospital Problems   No resolved problems to display.     #ESRD on iHD  MWF schedule at outpatient iHD unit.   Next session for 1/28/22. Can be done outpatient  Stable for dispo from renal perspective    #acute pancreatitis  Lipase mildly elevated  Clinically improving with supportive care  Can stop fluids when diet advanced    #hypertension  Diltizaem 30mg daily  Imdur 30mg daily  Losartan 25mg daily  Toprol XL 25mg  Monitor with UF    #chronic biventricular HF  #pulmonary hypertension  Chronically elevated BNP  Monitoring with UF  Appears clinically compensated currently    #CKD/MBD/nutrition  Continue home binders    Thank you for allowing us to consult on Griselda Neely.  Our group will follow-up. Patient can return to iHD unit for nephrology follow up at time of hospital discharge.    Ramiro Butler MD  [facility] Nephrology  01/27/2022

## 2022-01-27 NOTE — HOSPITAL COURSE
HPI: HPI per patient and per patient's family is at bedside  76-year-old female with COPD on 4 L, current tobacco smoker, history of pancreatitis, atrial fibrillation on warfarin, mitral replacement,  ESRD (MWF), hypertension, hyperlipidemia, pulmonary hypertension, PVD, diverticulosis, DNR comes in for epigastric pain.  Patient was seen in the ED yesterday and was diagnosed with acute pancreatitis and was recommended to be admitted however patient refused.  Patient reports that she developed epigastric pain about 6 days ago.  Describes pain as sharp nonradiating severe pain.  Reported that she had decreased appetite.  States that she was unable to eat since about 3 days ago.  Denies fever, chills, chest pain, shortness of breath.  Patient reported the pain was persistent today and cannot tolerate anymore and came to the ED for further evaluation.    Hospital course  Patient was admitted with mild pancreatitis.  Lipase level was minimally elevated . He is a dialysis patient and she got dialysis during the hospital stay.  Later patient tolerated to the diet and discharged in stable condition to follow with primary care doctor as outpatient.

## 2022-01-27 NOTE — PLAN OF CARE
01/27/22 0755   Patient Assessment/Suction   Level of Consciousness (AVPU) alert   Respiratory Effort Normal;Unlabored   Expansion/Accessory Muscles/Retractions no retractions;no use of accessory muscles   All Lung Fields Breath Sounds diminished   PRE-TX-O2   O2 Device (Oxygen Therapy) High Flow nasal Cannula   $ Is the patient on Low Flow Oxygen? Yes   Flow (L/min) 4   SpO2 100 %   Pulse Oximetry Type Intermittent   $ Pulse Oximetry - Multiple Charge Pulse Oximetry - Multiple   Pulse 74   Resp 18   Aerosol Therapy   $ Aerosol Therapy Charges PRN treatment not required   Education   $ Education Bronchodilator;15 min   Respiratory Evaluation   $ Care Plan Tech Time 15 min   $ Eval/Re-eval Charges Re-evaluation

## 2022-01-27 NOTE — NURSING
Patient went to dialysis and no c/o voiced, returned and refuse to have bed alarm on. Patient sat up in chair and tatd she was hungry and wanted food, explained to her she needed to let her pancreas rest, she incline that she had no pain and was going get herself something to eat. At 1630 CBG was 53 and offered OJ and encourage to drink and refused. Dr Fuentes is aware of patients behavior.

## 2022-01-27 NOTE — PLAN OF CARE
01/27/22 1150   Final Note   Assessment Type Final Discharge Note   Anticipated Discharge Disposition Home-Health   What phone number can be called within the next 1-3 days to see how you are doing after discharge? [phone]   Post-Acute Status   Post-Acute Authorization Home Health;Dialysis   Home Health Status Set-up Complete/Auth obtained   Diaylsis Status Set-up Complete/Auth obtained   Discharge Delays None known at this time   [facility]  LENNIE  called [facility] and spoke to Kayla.  MARKELL will be tomorrow 01/28/2022.  Per Julsisa at [facility], pt will have dialysis tomorrow 01/28/2022.  Discharge orders reviewed.  No further case management needs noted.

## 2022-01-27 NOTE — PLAN OF CARE
Problem: Adult Inpatient Plan of Care  Goal: Plan of Care Review  Outcome: Ongoing, Progressing  Goal: Patient-Specific Goal (Individualized)  Outcome: Ongoing, Progressing  Goal: Absence of Hospital-Acquired Illness or Injury  Outcome: Ongoing, Progressing  Goal: Optimal Comfort and Wellbeing  Outcome: Ongoing, Progressing  Goal: Readiness for Transition of Care  Outcome: Ongoing, Progressing     Problem: Fluid Imbalance (Pneumonia)  Goal: Fluid Balance  Outcome: Ongoing, Progressing     Problem: Infection (Pneumonia)  Goal: Resolution of Infection Signs and Symptoms  Outcome: Ongoing, Progressing     Problem: Respiratory Compromise (Pneumonia)  Goal: Effective Oxygenation and Ventilation  Outcome: Ongoing, Progressing     Problem: Fall Injury Risk  Goal: Absence of Fall and Fall-Related Injury  Outcome: Ongoing, Progressing     Problem: Infection (Hemodialysis)  Goal: Absence of Infection Signs and Symptoms  Outcome: Ongoing, Progressing

## 2022-01-27 NOTE — PLAN OF CARE
Problem: Adult Inpatient Plan of Care  Goal: Plan of Care Review  Outcome: Ongoing, Progressing  Goal: Patient-Specific Goal (Individualized)  Outcome: Ongoing, Progressing  Goal: Absence of Hospital-Acquired Illness or Injury  Outcome: Ongoing, Progressing  Goal: Optimal Comfort and Wellbeing  Outcome: Ongoing, Progressing  Goal: Readiness for Transition of Care  Outcome: Ongoing, Progressing     Problem: Fluid Imbalance (Pneumonia)  Goal: Fluid Balance  Outcome: Ongoing, Progressing     Problem: Infection (Pneumonia)  Goal: Resolution of Infection Signs and Symptoms  Outcome: Ongoing, Progressing     Problem: Hemodynamic Instability (Hemodialysis)  Goal: Effective Tissue Perfusion  Outcome: Ongoing, Progressing

## 2022-01-28 NOTE — PROGRESS NOTES
Admitted 1/24-1/27. Hospital course: Patient was admitted with mild pancreatitis.  Lipase level was minimally elevated . He is a dialysis patient and she got dialysis during the hospital stay. Later patient tolerated to the diet and discharged in stable condition to follow with primary care doctor as outpatient.    No new medications. INR 5.1 on discharge.

## 2022-01-28 NOTE — PROGRESS NOTES
Devorah with [facility]  called in critical INR of 7.0. Devorah was advised of holding & follow up recommendations & communicated them with patient's sister who was present.     Patient received higher dosing than normal while in the hospital.

## 2022-02-08 NOTE — PROGRESS NOTES
INR has finally come down. Now slightly below goal. Will advise that she continue dose per calendar without boosting.

## 2022-03-02 PROBLEM — I48.0 PAROXYSMAL ATRIAL FIBRILLATION WITH RVR: Status: ACTIVE | Noted: 2020-10-26

## 2022-03-02 NOTE — ED NOTES
Report has been given. All questions and concerns addressed at this time. Will transfer after tele box arrives to unit.

## 2022-03-02 NOTE — ED PROVIDER NOTES
Encounter Date: 3/2/2022       History     Chief Complaint   Patient presents with    Shortness of Breath     Sob w/ vomiting and diarrhea x 2 weeks.     76-year-old female presented emergency department with cough and shortness of breath and nausea vomiting and diarrhea ongoing for the past 2 weeks.  Patient has history of end-stage renal disease and COPD and atrial fibrillation and on Coumadin.  Patient had dialysis this morning prior to coming here given patient's persistent symptoms.  Patient on home oxygen and uses 4 L of oxygen.  Patient while using home oxygen is not hypoxic.  Patient states she does not feel too bad however has been ongoing for the past 2 weeks so presented here.  Denies fever or chills.  Denies any chest pain or abdominal pain or any focal weakness or numbness however complains of having generalized malaise.        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    both legs    CHF (congestive heart failure)     Encounter for blood transfusion 03/2016    Gout     Hemodialysis access, AV graft     Hypertension     Mitral valve regurgitation     Osteoarthritis     Pancreatitis     Peripheral vascular disease     Pneumonia 09/09/2017    Renal failure      Past Surgical History:   Procedure Laterality Date    ACHILLES TENDON SURGERY Right     ANGIOGRAPHY OF ARTERIOVENOUS SHUNT Right 1/7/2020    Procedure: Fistulogram with Possible Intervention;  Surgeon: Joseph Loyola MD;  Location: [facility] CATH/EP LAB;  Service: Cardiology;  Laterality: Right;    ANGIOGRAPHY OF LOWER EXTREMITY Left 3/18/2020    Procedure: Angiogram Extremity Unilateral;  Surgeon: Ali Khoobehi, MD;  Location: [facility] CATH/EP LAB;  Service: Cardiology;  Laterality: Left;    APPENDECTOMY      CARDIAC CATHETERIZATION  07/03/2017    CHOLECYSTECTOMY      COLONOSCOPY Left 10/4/2020    Procedure:  COLONOSCOPY;  Surgeon: Jordan Kilpatrick MD;  Location: [facility] ENDO;  Service: Endoscopy;  Laterality: Left;    ESOPHAGOGASTRODUODENOSCOPY Left 8/17/2020    Procedure: EGD (ESOPHAGOGASTRODUODENOSCOPY);  Surgeon: Talat Trevizo MD;  Location: [facility] ENDO;  Service: Endoscopy;  Laterality: Left;    ESOPHAGOGASTRODUODENOSCOPY Left 10/2/2020    Procedure: EGD (ESOPHAGOGASTRODUODENOSCOPY);  Surgeon: Talat Trevizo MD;  Location: [facility] ENDO;  Service: Endoscopy;  Laterality: Left;    ESOPHAGOGASTRODUODENOSCOPY N/A 6/29/2021    Procedure: EGD (ESOPHAGOGASTRODUODENOSCOPY);  Surgeon: Sudheer Brown III, MD;  Location: [facility] ENDO;  Service: Endoscopy;  Laterality: N/A;    heal surgery Right     HYSTERECTOMY      INSERTION OF STENT INTO PERIPHERAL VESSEL N/A 1/7/2020    Procedure: INSERTION, STENT, VESSEL, PERIPHERAL;  Surgeon: Joseph Loyola MD;  Location: [facility] CATH/EP LAB;  Service: Cardiology;  Laterality: N/A;    MITRAL VALVE REPLACEMENT      PARATHYROIDECTOMY      PARATHYROIDECTOMY  07/13/2017    PERCUTANEOUS TRANSLUMINAL ANGIOPLASTY OF ARTERIOVENOUS FISTULA N/A 1/7/2020    Procedure: PTA, AV FISTULA;  Surgeon: Joseph Loyola MD;  Location: [facility] CATH/EP LAB;  Service: Cardiology;  Laterality: N/A;    PERITONEAL CATHETER INSERTION      RENAL BIOPSY      SMALL BOWEL ENTEROSCOPY N/A 12/2/2020    Procedure: ENTEROSCOPY;  Surgeon: Sudheer Brown III, MD;  Location: [facility] ENDO;  Service: Endoscopy;  Laterality: N/A;    vocal cord nodule      WOUND DEBRIDEMENT Left 7/13/2020    Procedure: DEBRIDEMENT, WOUND;  Surgeon: Carlos Elam III, MD;  Location: [facility] OR;  Service: General;  Laterality: Left;     Family History   Problem Relation Age of Onset    Heart disease Sister     Early death Sister         heart as baby    Heart disease Maternal Grandfather     Diabetes Mother     Hypertension Mother     Heart failure Mother     Heart disease Mother     Arthritis Mother     Diabetes Father      Early death Sister         infant     Social History     Tobacco Use    Smoking status: Current Some Day Smoker     Packs/day: 0.50     Years: 45.00     Pack years: 22.50     Types: Cigarettes    Smokeless tobacco: Never Used   Substance Use Topics    Alcohol use: No    Drug use: No     Review of Systems   Constitutional: Positive for fatigue.   HENT: Negative.    Eyes: Negative.    Respiratory: Positive for cough and shortness of breath.    Cardiovascular: Negative for chest pain.   Gastrointestinal: Positive for diarrhea, nausea and vomiting.   Endocrine: Negative.    Genitourinary: Negative.    Musculoskeletal: Negative.    Skin: Negative.    Allergic/Immunologic: Negative.    Neurological: Negative.    Hematological: Negative.    Psychiatric/Behavioral: Negative.    All other systems reviewed and are negative.      Physical Exam     Initial Vitals [03/02/22 1222]   BP Pulse Resp Temp SpO2   111/68 (!) 118 (!) 24 98.3 °F (36.8 °C) (!) 93 %      MAP       --         Physical Exam    Nursing note and vitals reviewed.  Constitutional: She appears well-developed and well-nourished.   HENT:   Head: Normocephalic and atraumatic.   Nose: Nose normal.   Mouth/Throat: Oropharynx is clear and moist.   Eyes: Conjunctivae and EOM are normal. Pupils are equal, round, and reactive to light.   Neck: Neck supple. No thyromegaly present. No tracheal deviation present. No JVD present.   Normal range of motion.  Cardiovascular: Normal rate, normal heart sounds and intact distal pulses.   No murmur heard.  Irregularly irregular rhythm   Pulmonary/Chest: No stridor. No respiratory distress. She has no wheezes. She has rales.   Abdominal: Abdomen is soft. Bowel sounds are normal. She exhibits no distension. There is no abdominal tenderness.   Musculoskeletal:         General: No edema. Normal range of motion.      Cervical back: Normal range of motion and neck supple.     Neurological: She is alert and oriented to person,  place, and time. She has normal strength. No sensory deficit. GCS score is 15. GCS eye subscore is 4. GCS verbal subscore is 5. GCS motor subscore is 6.   Skin: Skin is warm. Capillary refill takes less than 2 seconds.   Psychiatric: She has a normal mood and affect. Thought content normal.         ED Course   Procedures  Labs Reviewed   CBC W/ AUTO DIFFERENTIAL - Abnormal; Notable for the following components:       Result Value    RBC 3.88 (*)     Hemoglobin 11.1 (*)     Hematocrit 35.6 (*)     MCHC 31.2 (*)     RDW 16.4 (*)     Platelets 143 (*)     Lymph # 0.8 (*)     Gran % 75.2 (*)     Lymph % 15.8 (*)     All other components within normal limits   COMPREHENSIVE METABOLIC PANEL - Abnormal; Notable for the following components:    Sodium 135 (*)     Glucose 142 (*)     Creatinine 4.2 (*)     ALT 9 (*)     eGFR if  11.2 (*)     eGFR if non  9.7 (*)     All other components within normal limits   B-TYPE NATRIURETIC PEPTIDE - Abnormal; Notable for the following components:    BNP >4,500 (*)     All other components within normal limits   CBC W/ AUTO DIFFERENTIAL - Abnormal; Notable for the following components:    RBC 3.88 (*)     Hemoglobin 11.1 (*)     Hematocrit 35.6 (*)     MCHC 31.2 (*)     RDW 16.4 (*)     Platelets 143 (*)     Lymph # 0.8 (*)     Gran % 75.2 (*)     Lymph % 15.8 (*)     All other components within normal limits   COMPREHENSIVE METABOLIC PANEL - Abnormal; Notable for the following components:    Sodium 135 (*)     Glucose 142 (*)     Creatinine 4.2 (*)     ALT 9 (*)     eGFR if  11.2 (*)     eGFR if non  9.7 (*)     All other components within normal limits   C-REACTIVE PROTEIN - Abnormal; Notable for the following components:    CRP 1.54 (*)     All other components within normal limits   LACTIC ACID, PLASMA - Abnormal; Notable for the following components:    Lactate (Lactic Acid) 2.3 (*)     All other components within  normal limits    Narrative:     LA critical result(s) called and verbal readback obtained from Allyssa Medley RN by HS3 03/02/2022 14:21   CULTURE, BLOOD   CULTURE, BLOOD   TROPONIN I   LACTATE DEHYDROGENASE   CK   TROPONIN I   SARS-COV-2 RNA AMPLIFICATION, QUAL   FERRITIN   POCT INFLUENZA A/B MOLECULAR     EKG Readings: (Independently Interpreted)   Initial Reading: No STEMI. Rhythm: Atrial Fibrillation. Heart Rate: 113. Ectopy: No Ectopy. Conduction: Normal.     ECG Results          EKG 12-lead (In process)  Result time 03/02/22 12:50:59    In process by Interface, Lab In [facility] (03/02/22 12:50:59)                 Narrative:    Test Reason : R06.02,    Vent. Rate : 113 BPM     Atrial Rate : 136 BPM     P-R Int : 000 ms          QRS Dur : 094 ms      QT Int : 350 ms       P-R-T Axes : 000 -81 156 degrees     QTc Int : 480 ms    Atrial fibrillation with rapid ventricular response with premature  ventricular or aberrantly conducted complexes  Left anterior fascicular block  Nonspecific ST and T wave abnormality  Abnormal ECG  When compared with ECG of 24-JAN-2022 12:48,  Nonspecific T wave abnormality now evident in Lateral leads    Referred By:             Confirmed By:                             Imaging Results          X-Ray Chest AP Portable (Final result)  Result time 03/02/22 13:02:47    Final result by Yang Chamorro MD (03/02/22 13:02:47)                 Narrative:    Reason: COVID-19    FINDINGS:    PA and lateral chest with comparison chest x-ray January 24, 2022 show an enlarged cardiomediastinal silhouette is unchanged.  Hazy opacification of the right lung noted with blunting of the costophrenic angle and obscuration of the hemidiaphragm. Bilateral diffuse groundglass and interstitial lung opacities are noted. These findings are all similar to previous exam. No acute osseous abnormality.    IMPRESSION:    1.  Enlarged cardiac mediastinal silhouette diffuse interstitial and groundglass lung opacities  likely reflects CHF with pulmonary edema.  2.  Right pleural effusion with overlying compressive atelectasis or infiltrate is unchanged.    Electronically signed by:  Yang Chamorro DO  3/2/2022 1:02 PM CST Workstation: [id number]                               Medications   ondansetron injection 8 mg (8 mg Intravenous Not Given 3/2/22 1300)   pantoprazole injection 40 mg (40 mg Intravenous Not Given 3/2/22 1300)   diltiaZEM 100 mg in dextrose 5% 100 mL IVPB (ready to mix system) (titrating) (has no administration in time range)   furosemide injection 40 mg (has no administration in time range)   aspirin tablet 325 mg (has no administration in time range)   albuterol-ipratropium 2.5 mg-0.5 mg/3 mL nebulizer solution 3 mL (3 mLs Nebulization Given 3/2/22 1258)   lorazepam injection 0.5 mg (0.5 mg Intravenous Given 3/2/22 1440)     Medical Decision Making:   Differential Diagnosis:   76-year-old female presented emergency department with shortness of breath and nausea vomiting and diarrhea over the past 2 weeks.  Patient also noted to be tachycardic.  Patient having shortness of breath and has evidence of pulmonary edema.  Patient does not have fever or signs of pneumonia however has some persistent cough.  Patient does have elevated lactic acid however fluids not given as patient having evidence of acute pulmonary edema and is on dialysis.  Screening labs reviewed.  Patient started on Cardizem for treatment of atrial fibrillation.  Hospital Medicine consulted for evaluation for admission given patient's symptoms and presentation.  Clinical Tests:   Lab Tests: Reviewed  Radiological Study: Reviewed  Medical Tests: Reviewed                      Clinical Impression:   Final diagnoses:  [R06.02] Shortness of breath  [N18.6] End stage renal disease (Primary)  [I50.20] Systolic congestive heart failure, unspecified HF chronicity  [J81.0] Acute pulmonary edema  [I48.91] Atrial fibrillation with rapid ventricular  response          ED Disposition Condition    Admit               Flor Hill MD  03/02/22 2889

## 2022-03-02 NOTE — H&P
[facility] History & Physical Examination   Patient Name: Griselda Neely  MRN: [id number]  Patient Class: Emergency   Admission Date: 3/2/2022 12:21 PM  Length of Stay: 0  Attending Physician:   Primary Care Provider: Kalie Neely MD  Face-to-Face encounter date: 03/02/2022  Code Status: DNR  MPOA:  Chief Complaint: Shortness of Breath (Sob w/ vomiting and diarrhea x 2 weeks.)        Patient information was obtained from patient, past medical records and ER records.   HISTORY OF PRESENT ILLNESS:   Griselda Neely is a 76 y.o. 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 The patient presented to [facility] on 3/2/2022 with a primary complaint of Shortness of Breath (Sob w/ vomiting and diarrhea x 2 weeks.)  .   76-year-old  female presents to emergency room with shortness of breath vomiting and diarrhea for the past 2 weeks    Past medical history significant for atrial fibrillation, chronic systolic diastolic heart failure, end-stage renal disease with hemodialysis 3 times a week, calciphylaxis, hypertension with episodes of hypotension, chronic pancreatitis, mitral valve replacement and pulmonary hypertension.    The patient is a DNR status    The patient states for the past 2 weeks she has been having intermittent nausea vomiting and diarrhea.  She states currently she feels better and she came to the hospital today because of her shortness of breath.  She usually requires 4 L of oxygen at home patient states wishes when her oxygen oxygen saturations are stable.    The patient states she normally dialyzed 3 times a week and she has been  compliant with her hemodialysis sessions her last hemodialysis was today for her full-time    None unless she is concerned about her increased shortness of breath    Emergency room she was found to be in atrial fibrillation with a rapid ventricular response.  She also had evidence of pulmonary edema.  She was given a dose of IV Lasix in the emergency room placed on a Cardizem drip.  She became hypotensive and the Cardizem drip was discontinued per recommendations by my attending and the patient was placed on amiodarone.  The patient is anticoagulated with Coumadin and the INR level is pending    REVIEW OF SYSTEMS:   10 Point Review of System was performed and was found to be negative except for that mentioned already in the HPI and   Review of Systems (Negative unless checked off)  Review of Systems   Constitutional: Positive for malaise/fatigue.   HENT: Negative.    Eyes: Negative.    Respiratory: Positive for shortness of breath.    Cardiovascular: Negative.    Gastrointestinal: Positive for abdominal pain.   Genitourinary: Negative.    Musculoskeletal: Negative.    Skin: Negative.    Neurological: Positive for weakness.   Endo/Heme/Allergies: Negative.    Psychiatric/Behavioral: Negative.            PAST MEDICAL HISTORY:     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    both legs    CHF (congestive heart failure)     Encounter for blood transfusion 03/2016    Gout     Hemodialysis access, AV graft     Hypertension     Mitral valve regurgitation     Osteoarthritis     Pancreatitis     Peripheral vascular disease     Pneumonia 09/09/2017    Renal failure        PAST SURGICAL HISTORY:     Past Surgical History:   Procedure Laterality Date    ACHILLES TENDON SURGERY Right     ANGIOGRAPHY OF ARTERIOVENOUS SHUNT Right 1/7/2020    Procedure: Fistulogram with Possible Intervention;  Surgeon: Joseph Loyola MD;  Location: [facility] CATH/EP LAB;  Service:  Cardiology;  Laterality: Right;    ANGIOGRAPHY OF LOWER EXTREMITY Left 3/18/2020    Procedure: Angiogram Extremity Unilateral;  Surgeon: Ali Khoobehi, MD;  Location: [facility] CATH/EP LAB;  Service: Cardiology;  Laterality: Left;    APPENDECTOMY      CARDIAC CATHETERIZATION  07/03/2017    CHOLECYSTECTOMY      COLONOSCOPY Left 10/4/2020    Procedure: COLONOSCOPY;  Surgeon: Jordan Kilpatrick MD;  Location: [facility] ENDO;  Service: Endoscopy;  Laterality: Left;    ESOPHAGOGASTRODUODENOSCOPY Left 8/17/2020    Procedure: EGD (ESOPHAGOGASTRODUODENOSCOPY);  Surgeon: Talat Trevizo MD;  Location: [facility] ENDO;  Service: Endoscopy;  Laterality: Left;    ESOPHAGOGASTRODUODENOSCOPY Left 10/2/2020    Procedure: EGD (ESOPHAGOGASTRODUODENOSCOPY);  Surgeon: Talat Trevizo MD;  Location: [facility] ENDO;  Service: Endoscopy;  Laterality: Left;    ESOPHAGOGASTRODUODENOSCOPY N/A 6/29/2021    Procedure: EGD (ESOPHAGOGASTRODUODENOSCOPY);  Surgeon: Sudheer Brown III, MD;  Location: [facility];  Service: Endoscopy;  Laterality: N/A;    heal surgery Right     HYSTERECTOMY      INSERTION OF STENT INTO PERIPHERAL VESSEL N/A 1/7/2020    Procedure: INSERTION, STENT, VESSEL, PERIPHERAL;  Surgeon: Joseph Loyola MD;  Location: [facility] CATH/EP LAB;  Service: Cardiology;  Laterality: N/A;    MITRAL VALVE REPLACEMENT      PARATHYROIDECTOMY      PARATHYROIDECTOMY  07/13/2017    PERCUTANEOUS TRANSLUMINAL ANGIOPLASTY OF ARTERIOVENOUS FISTULA N/A 1/7/2020    Procedure: PTA, AV FISTULA;  Surgeon: Joseph Loyola MD;  Location: [facility] CATH/EP LAB;  Service: Cardiology;  Laterality: N/A;    PERITONEAL CATHETER INSERTION      RENAL BIOPSY      SMALL BOWEL ENTEROSCOPY N/A 12/2/2020    Procedure: ENTEROSCOPY;  Surgeon: Sudheer Brown III, MD;  Location: [facility] ENDO;  Service: Endoscopy;  Laterality: N/A;    vocal cord nodule      WOUND DEBRIDEMENT Left 7/13/2020    Procedure: DEBRIDEMENT, WOUND;  Surgeon: Carlos Elam III, MD;  Location:  [facility] OR;  Service: General;  Laterality: Left;       ALLERGIES:   Patient has no known allergies.    FAMILY HISTORY:     Family History   Problem Relation Age of Onset    Heart disease Sister     Early death Sister         heart as baby    Heart disease Maternal Grandfather     Diabetes Mother     Hypertension Mother     Heart failure Mother     Heart disease Mother     Arthritis Mother     Diabetes Father     Early death Sister         infant       SOCIAL HISTORY:     Social History     Tobacco Use    Smoking status: Current Some Day Smoker     Packs/day: 0.50     Years: 45.00     Pack years: 22.50     Types: Cigarettes    Smokeless tobacco: Never Used   Substance Use Topics    Alcohol use: No        Social History     Substance and Sexual Activity   Sexual Activity Not Currently        HOME MEDICATIONS:     Prior to Admission medications    Medication Sig Start Date End Date Taking? Authorizing Provider   albuterol (ACCUNEB) 1.25 mg/3 mL Nebu Take 1.25 mg by nebulization every 6 (six) hours as needed. Rescue  HCS   Yes Historical Provider   albuterol (PROVENTIL/VENTOLIN HFA) 90 mcg/actuation inhaler Inhale 1 puff into the lungs every 4 (four) hours as needed for Wheezing. rescue 12/11/20  Yes Historical Provider   aspirin (ECOTRIN) 81 MG EC tablet Take 81 mg by mouth once daily.   Yes Historical Provider   diltiaZEM (CARDIZEM) 30 MG tablet Take 1 tablet (30 mg total) by mouth 4 (four) times daily before meals and nightly. 10/14/21 10/14/22 Yes Gabe Davis MD   hydrALAZINE (APRESOLINE) 50 MG tablet Take 1 tablet (50 mg total) by mouth every 8 (eight) hours. 12/31/20 12/31/21 Yes Kalie Neely MD   isosorbide mononitrate (IMDUR) 30 MG 24 hr tablet Take 1 tablet (30 mg total) by mouth once daily. 1/25/22  Yes Tosha Franklin PA-C   losartan (COZAAR) 25 MG tablet Take 1 tablet (25 mg total) by mouth every evening. 1/25/22  Yes Tosha Franklin PA-C   metoprolol succinate (TOPROL-XL) 25 MG  "24 hr tablet Take 1 tablet by mouth nightly 1/25/22  Yes Mary Do NP   ondansetron (ZOFRAN) 4 MG tablet Take 1 tablet (4 mg total) by mouth every 6 (six) hours as needed for Nausea. 1/23/22  Yes Erna Cartagena MD   pantoprazole (PROTONIX) 40 MG tablet Take 1 tablet by mouth once daily 1/25/22  Yes Mary Do NP   warfarin (COUMADIN) 5 MG tablet Take 1 tablet (5 mg total) by mouth Daily.  Patient taking differently: Take 2.5 mg by mouth Daily. 1/27/22 1/27/23 Yes David Gaytan MD   calcium acetate (PHOSLO) 667 mg capsule Take 667 mg by mouth once daily.     Historical Provider   oxyCODONE-acetaminophen (PERCOCET) 5-325 mg per tablet Take 1 tablet by mouth every 4 (four) hours as needed for Pain. 1/23/22   Erna Cartagena MD         PHYSICAL EXAM:   /68 (BP Location: Left arm, Patient Position: Sitting)   Pulse 95   Temp 98.3 °F (36.8 °C) (Oral)   Resp 18   Ht 5' 5" (1.651 m)   Wt 59 kg (130 lb)   LMP  (LMP Unknown)   SpO2 99%   BMI 21.63 kg/m²   Vitals Reviewed  General appearance: Frail and ill appearing female in no apparent distress.  Skin: No Rash.   Neuro: Motor and sensory exams grossly intact. Good tone. Power in all 4 extremities 5/5.   HENT: Atraumatic head. Moist mucous membranes of oral cavity.  Eyes: Normal extraocular movements.   Neck: Supple. No evidence of lymphadenopathy. No thyroidomegaly.  Lungs: Clear to auscultation bilaterally. No wheezing present.   Heart: Regular rate and rhythm. S1 and S2 present with + murmurs/no gallop/no rub. No pedal edema. No JVD present.   Abdomen: Soft, non-distended, non-tender. No rebound tenderness/guarding. No masses or organomegaly. Bowel sounds are normal. Bladder is not palpable.   Extremities: No cyanosis, clubbing, or edema. Right upper arm AV fistula with good thrill/bruit, wrapped in stretch gaze (s/p recent HD)  Psych/mental status: Alert and oriented. Cooperative. Responds appropriately to questions.   EMERGENCY " DEPARTMENT LABS AND IMAGING:   Following labs were Reviewed   Recent Labs   Lab 03/02/22  1251   WBC 4.76  4.76   HGB 11.1*  11.1*   HCT 35.6*  35.6*   *  143*   CALCIUM 8.8  8.8   ALBUMIN 3.5  3.5   PROT 8.0  8.0   *  135*   K 4.1  4.1   CO2 26  26   CL 96  96   BUN 21  21   CREATININE 4.2*  4.2*   ALKPHOS 77  77   ALT 9*  9*   AST 17  17   BILITOT 0.6  0.6         BMP:   Recent Labs   Lab 03/02/22  1251   *  142*   *  135*   K 4.1  4.1   CL 96  96   CO2 26  26   BUN 21  21   CREATININE 4.2*  4.2*   CALCIUM 8.8  8.8   , CMP   Recent Labs   Lab 03/02/22  1251   *  135*   K 4.1  4.1   CL 96  96   CO2 26  26   *  142*   BUN 21  21   CREATININE 4.2*  4.2*   CALCIUM 8.8  8.8   PROT 8.0  8.0   ALBUMIN 3.5  3.5   BILITOT 0.6  0.6   ALKPHOS 77  77   AST 17  17   ALT 9*  9*   ANIONGAP 13  13   ESTGFRAFRICA 11.2*  11.2*   EGFRNONAA 9.7*  9.7*   , CBC   Recent Labs   Lab 03/02/22  1251   WBC 4.76  4.76   HGB 11.1*  11.1*   HCT 35.6*  35.6*   *  143*   , INR   Lab Results   Component Value Date    INR 1.2 02/21/2022    INR 1.2 02/14/2022    INR 1.2 02/07/2022   , Lipid Panel   Lab Results   Component Value Date    CHOL 157 10/13/2021    HDL 66 10/13/2021    LDLCALC 76.4 10/13/2021    TRIG 73 10/13/2021    CHOLHDL 42.0 10/13/2021   , Troponin   Recent Labs   Lab 03/02/22  1251   TROPONINI 0.034  0.034   , A1C: No results for input(s): HGBA1C in the last 4320 hours. and All labs within the past 24 hours have been reviewed  Microbiology Results (last 7 days)     Procedure Component Value Units Date/Time    Blood culture x two cultures. Draw prior to antibiotics. [[id number]] Collected: 03/02/22 1348    Order Status: Sent Specimen: Blood from Peripheral, Antecubital, Right Updated: 03/02/22 1354    Blood culture x two cultures. Draw prior to antibiotics. [[id number]] Collected: 03/02/22 1346    Order Status: Sent Specimen: Blood from  Peripheral, Antecubital, Left Updated: 03/02/22 1354        X-Ray Chest AP Portable   Final Result        X-Ray Chest AP Portable    Result Date: 3/2/2022  Reason: COVID-19 FINDINGS: PA and lateral chest with comparison chest x-ray January 24, 2022 show an enlarged cardiomediastinal silhouette is unchanged. Hazy opacification of the right lung noted with blunting of the costophrenic angle and obscuration of the hemidiaphragm. Bilateral diffuse groundglass and interstitial lung opacities are noted. These findings are all similar to previous exam. No acute osseous abnormality. IMPRESSION: 1.  Enlarged cardiac mediastinal silhouette diffuse interstitial and groundglass lung opacities likely reflects CHF with pulmonary edema. 2.  Right pleural effusion with overlying compressive atelectasis or infiltrate is unchanged. Electronically signed by:  Yang Chamorro DO  3/2/2022 1:02 PM CST Workstation: [id number]    I personally reviewed and agree and agree with the radiologist's findings    12  lead EKG reveals atrial fibrillation with RVR of 113 right axis deviation good R-wave progression LVH by voltage QTc: 480 millisecond  ASSESSMENT & PLAN:   Griselda Neely is a 76 y.o. female admitted for    1.  Atrial Fib With RVR  -placed on amiodarone  -was on Cardizem but had hypotension change to amiodarone per my attending  -continue Coumadin/daily INR  -cardiology consult    2. ESRD with HD 3 x week Mon/Wed/Friday  - last HD was today for her full time  -nephrology Dr. Ingram consult  -renal dose all medications  -avoidance of nephrotoxic medications    3. Acute on Chronic Systolic/Diastolic HF  - last EF 25% (06/29/2021)  - was given small dose of Lasix in ED  -cardiology consult  -daily Lasix if blood pressure permits    4. Chronic Pancreatitis  -patient did present with complaints of abdominal pain nausea vomiting but states the symptoms have resolved  -lipase pending    5.  COPD  -DuoNeb treatments Q 8 hours    6.   History of mitral valve replacement  -on Coumadin    7.  Elevated lactic acid level  -unclear source  -repeating 4 hour    8. DNR Status        DVT Prophylaxis: will be placed on Coumadin for DVT prophylaxis and will be advised to be as mobile as possible and sit in a chair as tolerated.   _____________________________________________________________  Face-to-Face encounter date: 03/02/2022  Encounter included review of the medical records, interviewing and examining the patient face-to-face, discussion with family and other health care providers including emergency medicine physician, admission orders, interpreting lab/test results and formulating a plan of care.   Medical Decision Making during this encounter was  [_] Low Complexity  [_] Moderate Complexity  [x] High Complexity  _________________________________________________________________________________    INPATIENT LIST OF MEDICATIONS     Current Facility-Administered Medications:     aspirin tablet 325 mg, 325 mg, Oral, ED 1 Time, Flor Hill MD    diltiaZEM 100 mg in dextrose 5% 100 mL IVPB (ready to mix system) (titrating), 0-15 mg/hr, Intravenous, Continuous, Flor Hill MD, Last Rate: 5 mL/hr at 03/02/22 1519, 5 mg/hr at 03/02/22 1519    levoFLOXacin 750 mg/150 mL IVPB 750 mg, 750 mg, Intravenous, ED 1 Time, Flor Hill MD    ondansetron injection 8 mg, 8 mg, Intravenous, ED 1 Time, Flor Hill MD    pantoprazole injection 40 mg, 40 mg, Intravenous, ED 1 Time, Flor Hill MD    Current Outpatient Medications:     albuterol (ACCUNEB) 1.25 mg/3 mL Nebu, Take 1.25 mg by nebulization every 6 (six) hours as needed. Rescue HCS, Disp: , Rfl:     albuterol (PROVENTIL/VENTOLIN HFA) 90 mcg/actuation inhaler, Inhale 1 puff into the lungs every 4 (four) hours as needed for Wheezing. rescue, Disp: , Rfl:     aspirin (ECOTRIN) 81 MG EC tablet, Take 81 mg by mouth once daily., Disp: , Rfl:     diltiaZEM (CARDIZEM) 30 MG tablet, Take 1 tablet (30 mg total) by  mouth 4 (four) times daily before meals and nightly., Disp: 120 tablet, Rfl: 11    hydrALAZINE (APRESOLINE) 50 MG tablet, Take 1 tablet (50 mg total) by mouth every 8 (eight) hours., Disp: 90 tablet, Rfl: 11    isosorbide mononitrate (IMDUR) 30 MG 24 hr tablet, Take 1 tablet (30 mg total) by mouth once daily., Disp: 90 tablet, Rfl: 3    losartan (COZAAR) 25 MG tablet, Take 1 tablet (25 mg total) by mouth every evening., Disp: 90 tablet, Rfl: 1    metoprolol succinate (TOPROL-XL) 25 MG 24 hr tablet, Take 1 tablet by mouth nightly, Disp: 90 tablet, Rfl: 0    ondansetron (ZOFRAN) 4 MG tablet, Take 1 tablet (4 mg total) by mouth every 6 (six) hours as needed for Nausea., Disp: 12 tablet, Rfl: 0    pantoprazole (PROTONIX) 40 MG tablet, Take 1 tablet by mouth once daily, Disp: 90 tablet, Rfl: 0    warfarin (COUMADIN) 5 MG tablet, Take 1 tablet (5 mg total) by mouth Daily. (Patient taking differently: Take 2.5 mg by mouth Daily.), Disp: 30 tablet, Rfl: 0    calcium acetate (PHOSLO) 667 mg capsule, Take 667 mg by mouth once daily. , Disp: , Rfl:     oxyCODONE-acetaminophen (PERCOCET) 5-325 mg per tablet, Take 1 tablet by mouth every 4 (four) hours as needed for Pain., Disp: 10 tablet, Rfl: 0      Scheduled Meds:   aspirin  325 mg Oral ED 1 Time    levoFLOXacin  750 mg Intravenous ED 1 Time    ondansetron  8 mg Intravenous ED 1 Time    pantoprazole  40 mg Intravenous ED 1 Time     Continuous Infusions:   dilTIAZem 5 mg/hr (03/02/22 1519)     PRN Meds:.      Chery Salazar  [facility] Hospitalist NP  03/02/2022

## 2022-03-02 NOTE — ED NOTES
"Entered room to administer Zofran, ASA, and Protonix.  Pt refused all.  States "There is nothing wrong with my stomach and I'm not nauseated."  MD notified.  "

## 2022-03-03 PROBLEM — I48.20 CHRONIC ATRIAL FIBRILLATION: Chronic | Status: ACTIVE | Noted: 2022-01-01

## 2022-03-03 PROBLEM — I38 VALVULAR HEART DISEASE: Chronic | Status: ACTIVE | Noted: 2022-01-01

## 2022-03-03 NOTE — CONSULTS
[facility]  Department of Cardiology  Consult Note      PATIENT NAME: Griselda Neely  MRN: [id number]  TODAY'S DATE: 03/03/2022  ADMIT DATE: 3/2/2022                          CONSULT REQUESTED BY: Lissa Portillo MD    SUBJECTIVE     PRINCIPAL PROBLEM: Paroxysmal atrial fibrillation with RVR      REASON FOR CONSULT:  AFib      HPI:    76-year-old female patient known to us.  She has a past medical history significant for chronic atrial fibrillation CHF, end-stage renal disease on hemodialysis, pancreatitis, PVD, osteoarthritis and mitral regurgitation presented to the emergency room with shortness of breath and diarrhea.  She was able to complete dialysis yesterday however after she started feeling worse and came to the ER.  In the ER she was found to be in AFib with RVR and evidence of pulmonary edema on her chest x-ray she was given IV Lasix and placed on a Cardizem drip.  This was later changed to amiodarone currently patient is on amiodarone drip.  The patient is anticoagulated on Coumadin and INR is only 1.4.  Currently she remains in atrial fibrillation heart rate is in the 90s.        From H&P  Griselda Neely is a 76 y.o. 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 The patient presented to [facility] on 3/2/2022 with a primary complaint of Shortness of Breath (Sob w/ vomiting and diarrhea x 2 weeks.)  .   76-year-old  female presents to emergency room with shortness of breath vomiting and diarrhea for the past 2 weeks     Past medical history significant for atrial fibrillation, chronic systolic diastolic heart failure, end-stage renal disease with  hemodialysis 3 times a week, calciphylaxis, hypertension with episodes of hypotension, chronic pancreatitis, mitral valve replacement and pulmonary hypertension.     The patient is a DNR status     The patient states for the past 2 weeks she has been having intermittent nausea vomiting and diarrhea.  She states currently she feels better and she came to the hospital today because of her shortness of breath.  She usually requires 4 L of oxygen at home patient states wishes when her oxygen oxygen saturations are stable.     The patient states she normally dialyzed 3 times a week and she has been compliant with her hemodialysis sessions her last hemodialysis was today for her full-time     None unless she is concerned about her increased shortness of breath     Emergency room she was found to be in atrial fibrillation with a rapid ventricular response.  She also had evidence of pulmonary edema.  She was given a dose of IV Lasix in the emergency room placed on a Cardizem drip.  She became hypotensive and the Cardizem drip was discontinued per recommendations by my attending and the patient was placed on amiodarone.  The patient is anticoagulated with Coumadin and the INR level is pending        Review of patient's allergies indicates:  No Known Allergies    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    both legs    CHF (congestive heart failure)     Encounter for blood transfusion 03/2016    Gout     Hemodialysis access, AV graft     Hypertension     Mitral valve regurgitation     Osteoarthritis     Pancreatitis     Peripheral vascular disease     Pneumonia 09/09/2017    Renal failure      Past Surgical History:   Procedure Laterality Date    ACHILLES TENDON SURGERY Right     ANGIOGRAPHY OF ARTERIOVENOUS SHUNT Right 1/7/2020    Procedure: Fistulogram with Possible Intervention;  Surgeon: Joseph Loyloa MD;  Location: [facility] CATH/EP LAB;  Service:  Cardiology;  Laterality: Right;    ANGIOGRAPHY OF LOWER EXTREMITY Left 3/18/2020    Procedure: Angiogram Extremity Unilateral;  Surgeon: Ali Khoobehi, MD;  Location: [facility] CATH/EP LAB;  Service: Cardiology;  Laterality: Left;    APPENDECTOMY      CARDIAC CATHETERIZATION  07/03/2017    CHOLECYSTECTOMY      COLONOSCOPY Left 10/4/2020    Procedure: COLONOSCOPY;  Surgeon: Jordan Kilpatrick MD;  Location: [facility] ENDO;  Service: Endoscopy;  Laterality: Left;    ESOPHAGOGASTRODUODENOSCOPY Left 8/17/2020    Procedure: EGD (ESOPHAGOGASTRODUODENOSCOPY);  Surgeon: Talat Trevizo MD;  Location: [facility] ENDO;  Service: Endoscopy;  Laterality: Left;    ESOPHAGOGASTRODUODENOSCOPY Left 10/2/2020    Procedure: EGD (ESOPHAGOGASTRODUODENOSCOPY);  Surgeon: Talat Trevizo MD;  Location: [facility] ENDO;  Service: Endoscopy;  Laterality: Left;    ESOPHAGOGASTRODUODENOSCOPY N/A 6/29/2021    Procedure: EGD (ESOPHAGOGASTRODUODENOSCOPY);  Surgeon: Sudheer Brown III, MD;  Location: [facility];  Service: Endoscopy;  Laterality: N/A;    heal surgery Right     HYSTERECTOMY      INSERTION OF STENT INTO PERIPHERAL VESSEL N/A 1/7/2020    Procedure: INSERTION, STENT, VESSEL, PERIPHERAL;  Surgeon: Joseph Loyola MD;  Location: [facility] CATH/EP LAB;  Service: Cardiology;  Laterality: N/A;    MITRAL VALVE REPLACEMENT      PARATHYROIDECTOMY      PARATHYROIDECTOMY  07/13/2017    PERCUTANEOUS TRANSLUMINAL ANGIOPLASTY OF ARTERIOVENOUS FISTULA N/A 1/7/2020    Procedure: PTA, AV FISTULA;  Surgeon: Joseph Loyola MD;  Location: [facility] CATH/EP LAB;  Service: Cardiology;  Laterality: N/A;    PERITONEAL CATHETER INSERTION      RENAL BIOPSY      SMALL BOWEL ENTEROSCOPY N/A 12/2/2020    Procedure: ENTEROSCOPY;  Surgeon: Sudheer Brown III, MD;  Location: [facility] ENDO;  Service: Endoscopy;  Laterality: N/A;    vocal cord nodule      WOUND DEBRIDEMENT Left 7/13/2020    Procedure: DEBRIDEMENT, WOUND;  Surgeon: Carlos Elam III, MD;  Location:  [facility] OR;  Service: General;  Laterality: Left;     Social History     Tobacco Use    Smoking status: Current Some Day Smoker     Packs/day: 0.50     Years: 45.00     Pack years: 22.50     Types: Cigarettes    Smokeless tobacco: Never Used   Substance Use Topics    Alcohol use: No    Drug use: No        REVIEW OF SYSTEMS  CONSTITUTIONAL: Negative for chills, fatigue and fever.   EYES: No double vision, No blurred vision  NEURO: No headaches, No dizziness  RESPIRATORY:  Positive shortness of breath  CARDIOVASCULAR:  Positive palpitations  GI: Negative for abdominal pain, No melena,   : Negative for dysuria and frequency, Negative for hematuria  SKIN: Negative for bruising, Negative for edema or discoloration noted.   ENDOCRINE: Negative for polyphagia, Negative for heat intolerance, Negative for cold intolerance  PSYCHIATRIC: Negative for depression, Negative for anxiety, Negative for memory loss  MUSCULOSKELETAL: Negative for neck pain, Negative for muscle weakness, Negative for back pain     OBJECTIVE     VITAL SIGNS (Most Recent)  Temp: 97.8 °F (36.6 °C) (03/03/22 0754)  Pulse: 100 (03/03/22 0841)  Resp: 18 (03/03/22 0841)  BP: 105/63 (03/03/22 0754)  SpO2: 95 % (03/03/22 0841)    VENTILATION STATUS  Resp: 18 (03/03/22 0841)  SpO2: 95 % (03/03/22 0841)       I & O (Last 24H):    Intake/Output Summary (Last 24 hours) at 3/3/2022 0916  Last data filed at 3/2/2022 2000  Gross per 24 hour   Intake 160 ml   Output --   Net 160 ml       WEIGHTS  Wt Readings from Last 3 Encounters:   03/03/22 0500 59.3 kg (130 lb 11.7 oz)   03/02/22 1222 59 kg (130 lb)   01/24/22 1735 57.7 kg (127 lb 5.1 oz)   01/24/22 1201 55.8 kg (123 lb)   01/22/22 2311 54.5 kg (120 lb 2.4 oz)       PHYSICAL EXAM  GENERAL: well built, well nourished, well-developed in no apparent distress alert and oriented.   HEENT: Normocephalic. Pupils normal and conjunctivae normal.  Mucous membranes normal, no cyanosis or icterus, trachea central,no pallor  or icterus is noted..   NECK: No JVD. No bruit..   THYROID: Thyroid not enlarged. No nodules present..   CARDIAC:  Irregular rate and rhythm systolic murmur  CHEST ANATOMY: normal.   LUNGS:  Coarse crackles and wheezing..   ABDOMEN:  Mild distention of abdomen  URINARY: No núñez catheter   EXTREMITIES: No cyanosis, clubbing or edema noted at this time., no calf tenderness bilaterally.   PERIPHERAL VASCULAR SYSTEM: Good palpable distal pulses.   CENTRAL NERVOUS SYSTEM: No focal motor or sensory deficits noted.   SKIN: Skin without lesions, moist, well perfused.   MUSCLE STRENGTH & TONE: No noteable weakness, atrophy or abnormal movement.     HOME MEDICATIONS:  No current facility-administered medications on file prior to encounter.     Current Outpatient Medications on File Prior to Encounter   Medication Sig Dispense Refill    albuterol (ACCUNEB) 1.25 mg/3 mL Nebu Take 1.25 mg by nebulization every 6 (six) hours as needed. Rescue  HCS      albuterol (PROVENTIL/VENTOLIN HFA) 90 mcg/actuation inhaler Inhale 1 puff into the lungs every 4 (four) hours as needed for Wheezing. rescue      aspirin (ECOTRIN) 81 MG EC tablet Take 81 mg by mouth once daily.      diltiaZEM (CARDIZEM) 30 MG tablet Take 1 tablet (30 mg total) by mouth 4 (four) times daily before meals and nightly. 120 tablet 11    hydrALAZINE (APRESOLINE) 50 MG tablet Take 1 tablet (50 mg total) by mouth every 8 (eight) hours. 90 tablet 11    isosorbide mononitrate (IMDUR) 30 MG 24 hr tablet Take 1 tablet (30 mg total) by mouth once daily. 90 tablet 3    losartan (COZAAR) 25 MG tablet Take 1 tablet (25 mg total) by mouth every evening. 90 tablet 1    metoprolol succinate (TOPROL-XL) 25 MG 24 hr tablet Take 1 tablet by mouth nightly 90 tablet 0    ondansetron (ZOFRAN) 4 MG tablet Take 1 tablet (4 mg total) by mouth every 6 (six) hours as needed for Nausea. 12 tablet 0    pantoprazole (PROTONIX) 40 MG tablet Take 1 tablet by mouth once daily 90 tablet 0     warfarin (COUMADIN) 5 MG tablet Take 1 tablet (5 mg total) by mouth Daily. (Patient taking differently: Take 2.5 mg by mouth Daily.) 30 tablet 0    calcium acetate (PHOSLO) 667 mg capsule Take 667 mg by mouth once daily.       oxyCODONE-acetaminophen (PERCOCET) 5-325 mg per tablet Take 1 tablet by mouth every 4 (four) hours as needed for Pain. 10 tablet 0       SCHEDULED MEDS:   aspirin  81 mg Oral Daily    calcium acetate(phosphat bind)  667 mg Oral Daily    diltiaZEM  30 mg Oral QID (AC & HS)    isosorbide mononitrate  30 mg Oral Daily    losartan  25 mg Oral QHS    metoprolol succinate  25 mg Oral Nightly    pantoprazole  40 mg Oral Daily    warfarin  2.5 mg Oral Daily       CONTINUOUS INFUSIONS:   amiodarone in dextrose 5% 0.5 mg/min (03/03/22 0015)       PRN MEDS:ipratropium, levalbuterol, melatonin, ondansetron, oxyCODONE-acetaminophen, sodium chloride 0.9%    LABS AND DIAGNOSTICS     CBC LAST 3 DAYS  Recent Labs   Lab 03/02/22  1251 03/03/22  0431   WBC 4.76  4.76 4.57   RBC 3.88*  3.88* 3.86*   HGB 11.1*  11.1* 11.0*   HCT 35.6*  35.6* 34.9*   MCV 92  92 90   MCH 28.6  28.6 28.5   MCHC 31.2*  31.2* 31.5*   RDW 16.4*  16.4* 16.3*   *  143* 118*   MPV 11.7  11.7 12.0   GRAN 75.2*  75.2*  3.6  3.6 74.0*  3.4   LYMPH 15.8*  15.8*  0.8*  0.8* 16.0*  0.7*   MONO 7.4  7.4  0.4  0.4 8.3  0.4   BASO 0.02  0.02 0.02   NRBC 0  0 1*       COAGULATION LAST 3 DAYS  Recent Labs   Lab 03/02/22  1843 03/03/22  0431   LABPT 17.6* 16.4*   INR 1.6 1.4       CHEMISTRY LAST 3 DAYS  Recent Labs   Lab 03/02/22  1251 03/03/22  0431   *  135* 135*   K 4.1  4.1 4.0   CL 96  96 98   CO2 26  26 22*   ANIONGAP 13  13 15   BUN 21  21 28*   CREATININE 4.2*  4.2* 4.8*   *  142* 81   CALCIUM 8.8  8.8 8.1*   ALBUMIN 3.5  3.5 3.2*   PROT 8.0  8.0 7.3   ALKPHOS 77  77 62   ALT 9*  9* 9*   AST 17  17 15   BILITOT 0.6  0.6 0.8       CARDIAC PROFILE LAST 3 DAYS  Recent Labs    Lab 03/02/22  1251 03/02/22  1843 03/03/22  0432   BNP >4,500*  --   --    CPK 50  --   --      --   --    TROPONINI 0.034  0.034 0.032 0.036       ENDOCRINE LAST 3 DAYS  Recent Labs   Lab 03/02/22  1843   PROCAL 0.18       LAST ARTERIAL BLOOD GAS  ABG  No results for input(s): PH, PO2, PCO2, HCO3, BE in the last 168 hours.    LAST 7 DAYS MICROBIOLOGY   Microbiology Results (last 7 days)     Procedure Component Value Units Date/Time    Blood culture x two cultures. Draw prior to antibiotics. [[id number]] Collected: 03/02/22 1347    Order Status: Completed Specimen: Blood from Peripheral, Antecubital, Left Updated: 03/02/22 2117     Blood Culture, Routine No Growth to date    Narrative:      Aerobic and anaerobic    Blood culture x two cultures. Draw prior to antibiotics. [[id number]] Collected: 03/02/22 1348    Order Status: Completed Specimen: Blood from Peripheral, Antecubital, Right Updated: 03/02/22 2117     Blood Culture, Routine No Growth to date    Narrative:      Aerobic and anaerobic          MOST RECENT IMAGING  X-Ray Chest AP Portable  Reason: COVID-19    FINDINGS:    PA and lateral chest with comparison chest x-ray January 24, 2022 show an enlarged cardiomediastinal silhouette is unchanged.  Hazy opacification of the right lung noted with blunting of the costophrenic angle and obscuration of the hemidiaphragm. Bilateral diffuse groundglass and interstitial lung opacities are noted. These findings are all similar to previous exam. No acute osseous abnormality.    IMPRESSION:    1.  Enlarged cardiac mediastinal silhouette diffuse interstitial and groundglass lung opacities likely reflects CHF with pulmonary edema.  2.  Right pleural effusion with overlying compressive atelectasis or infiltrate is unchanged.    Electronically signed by:  Yang Chamorro DO  3/2/2022 1:02 PM CST Workstation: [id number]      ECHOCARDIOGRAM RESULTS (last 5)  Results for orders placed during the hospital encounter of  06/28/21    Echo    Interpretation Summary  · The left ventricle is normal in size with mild concentric hypertrophy and severely decreased systolic function.  · The estimated ejection fraction is 25%.  · Left ventricular diastolic dysfunction.  · Mild right ventricular enlargement with mildly reduced right ventricular systolic function.  · Moderate left atrial enlargement.  · Mild right atrial enlargement.  · Mild to moderate tricuspid regurgitation.  · Mild to moderate pulmonic regurgitation.  · There is a bioprosthetic mitral valve. There is mild insufficiency present. Prosthetic mitral valve is normal.      Results for orders placed during the hospital encounter of 11/07/20    Echo Color Flow Doppler? Yes    Interpretation Summary  · There is moderate left ventricular concentric hypertrophy.  · The estimated PA systolic pressure is 40 mmHg.  · The left ventricle is normal in size with mildly decreased systolic function. The estimated ejection fraction is 48%.  · Diastolic dysfunction.  · There are segmental left ventricular wall motion abnormalities.  · Moderate tricuspid regurgitation.  · Mild to moderate pulmonic regurgitation.  · There is mild pulmonary hypertension.  · Mild right ventricular enlargement with low normal right ventricular systolic function.  · Mild right atrial enlargement.  · Moderate left atrial enlargement.  · There is a bioprosthetic mitral valve. Prosthetic mitral valve is abnormal with evidence of . The mitral valve area is 1.92 cm2      Results for orders placed during the hospital encounter of 07/22/20    Transesophageal echo (TRUPTI)    Interpretation Summary  Indication:  Left atrial thrombus.    Counseling:  Patient as well as her family member were informed of the risks, benefits as well as alternatives to the procedure and informed consent was obtained.    Procedure:  After obtaining informed consent the patient was brought in to the cath lab in a fasting state and with an IV in  place. Patient was sedated with anesthesia team help. A TRUPTI probe was advanced without difficulty and images were obtained. Patient tolerated the procedure well and no immediate complications were noted. Patient was transferred out of the heart center in stable medical condition.    Complications:  None    Findings:  1.  Large echogenic mass  in the roof of the left atrium seen on previous TRUPTI has resolved. Significant amount of smoke and possible soft thrombus noted in the left atrial appendage extending into the left atrium.  2.  Mitral valve bioprosthesis in stable position.  Leaflets appear to be moving well.  Mild mitral regurgitation noted.  3.  Mild tricuspid regurgitation noted.  4. Aortic valve appears calcified. There appears to be mild restrictions of leaflet motion. No significant regurgitation.  5. Pulmonic valve was not well visualized.  6.  No obvious immediate complications.    Conclusions:  1.  Large echogenic mass in the roof of the left atrium seen on previous TRUPTI has resolved. Significant amount of smoke and possible soft thrombus noted in the left atrial appendage extending into the left atrium.      Results for orders placed during the hospital encounter of 01/02/20    Transesophageal echo (TRUPTI)    Interpretation Summary  1.  Large echogenic mass likely representing clot noted in the roof of the left atrium and in the left atrial appendage.  2.  Mitral valve bioprosthesis in stable position.  Leaflets appear to be moving well.  Mild mitral regurgitation noted.  3.  Mild tricuspid regurgitation noted.  4.  Mild plaque noted in the visualized portions of the descending thoracic aorta.  5.  No obvious immediate complications.      Echo Color Flow Doppler? Yes    Interpretation Summary  · Eccentric left ventricular hypertrophy.  · Severely decreased left ventricular systolic function. The estimated ejection fraction is 25%.  · Grade IV (severe) left ventricular diastolic dysfunction.  · Moderate  right ventricular enlargement.  · Moderately reduced right ventricular systolic function.  · Severe left atrial enlargement.  · Layered left atrial thrombus suggested. The thrombus is fixed and located in the inferior cavity.  · Moderate right atrial enlargement.  · There is a bioprosthetic mitral valve. Prosthetic mitral valve is normal.  · Moderate tricuspid regurgitation.  · Mild to moderate pulmonary hypertension present. Estimated PA systolic pressure 50 mm of Hg.  · Mild to moderate pulmonic regurgitation.  · Small posterior pericardial effusion.      CURRENT/PREVIOUS VISIT EKG  Results for orders placed or performed during the hospital encounter of 03/02/22   EKG 12-lead    Collection Time: 03/02/22 12:49 PM    Narrative    Test Reason : R06.02,    Vent. Rate : 113 BPM     Atrial Rate : 136 BPM     P-R Int : 000 ms          QRS Dur : 094 ms      QT Int : 350 ms       P-R-T Axes : 000 -81 156 degrees     QTc Int : 480 ms    Atrial fibrillation with rapid ventricular response with premature  ventricular or aberrantly conducted complexes  Left anterior fascicular block  Nonspecific ST and T wave abnormality  Abnormal ECG  When compared with ECG of 24-JAN-2022 12:48,  Nonspecific T wave abnormality now evident in Lateral leads    Referred By:             Confirmed By:            ASSESSMENT/PLAN:     Active Hospital Problems    Diagnosis    *Paroxysmal atrial fibrillation with RVR    Calciphylaxis    H/O mitral valve replacement    HLD (hyperlipidemia)    DNR (do not resuscitate)    COPD (chronic obstructive pulmonary disease)    Pulmonary hypertension    Anemia due to chronic kidney disease     Likely of chronic disease.       ESRD (end stage renal disease) on HD M,W, F           1. AFib with RVR  2. History of mitral valve replacement  3. End-stage renal disease on hemodialysis  4. Pulmonary edema  5. Shortness of breath  6. COPD  7. Pulmonary hypertension  8. Acute on chronic heart failure combination  of systolic and diastolic  9. Chronic Coumadin therapy INR not at goal        RECOMMENDATIONS:    Patient is currently on amiodarone drip patient was in chronic atrial fibrillation  May consider discontinuing this due to significant side effects  Rate control is our goal  Continue metoprolol and Cardizem  Chest x-ray  She may benefit from another dialysis treatment  INR is only at 1.4 consider giving 5 mg of Coumadin tonight be rechecking tomorrow          Mary Do NP  [facility]  Department of Cardiology  Date of Service: 03/03/2022        I have personally interviewed and examined the patient, I have reviewed the Nurse Practitioner's history and physical, assessment, and plan. I agree with the findings and plan.      Jerrod Asif M.D.  [facility]  Department of Cardiology  Date of Service: 03/03/2022  9:16 AM

## 2022-03-03 NOTE — PROGRESS NOTES
[facility]  Progress Note    Patient Name: Griselda Neely  MRN: [id number]  Patient Class: IP- Inpatient   Admission Date: 3/2/2022  Length of Stay: 1 days  Attending Physician: Lissa Portillo MD  Primary Care Provider: Kalie Neely MD        Subjective:     Principal Problem:Chronic atrial fibrillation        HPI:  Griselda Neely is a 76 y.o. 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 The patient presented to [facility] on 3/2/2022 with a primary complaint of Shortness of Breath (Sob w/ vomiting and diarrhea x 2 weeks.)  .   76-year-old  female presents to emergency room with shortness of breath vomiting and diarrhea for the past 2 weeks     Past medical history significant for atrial fibrillation, chronic systolic diastolic heart failure, end-stage renal disease with hemodialysis 3 times a week, calciphylaxis, hypertension with episodes of hypotension, chronic pancreatitis, mitral valve replacement and pulmonary hypertension.     The patient is a DNR status     The patient states for the past 2 weeks she has been having intermittent nausea vomiting and diarrhea.  She states currently she feels better and she came to the hospital today because of her shortness of breath.  She usually requires 4 L of oxygen at home patient states wishes when her oxygen oxygen saturations are stable.     The patient states she normally dialyzed 3 times a week and she has been compliant with her hemodialysis sessions her last hemodialysis was today for her full-time     None unless she is concerned about her increased shortness of breath      Emergency room she was found to be in atrial fibrillation with a rapid ventricular response.  She also had evidence of pulmonary edema.  She was given a dose of IV Lasix in the emergency room placed on a Cardizem drip.  She became hypotensive and the Cardizem drip was discontinued per recommendations by my attending and the patient was placed on amiodarone.  The patient is anticoagulated with Coumadin and the INR level is pending      Overview/Hospital Course:  No notes on file    Interval History:  Patient reports she was more short of breath on presentation but somewhat more stable now, she is on 4-5 L home oxygen, states she is still smoking some.  States she does not make any urine.  Reports she has to be cautious with her Coumadin as she does have history of bleeding.  Telemetry with atrial fibrillation, heart rates in the low 100s.  Labs with INR 1.4, BNP greater than 4500, potassium 4.  Previous echo with EF of 25% with diastolic dysfunction with moderate TR/UT.  Discussed with patient.  No visitors at bedside.    Review of Systems   Constitutional:  Negative for fever.   Respiratory:  Positive for shortness of breath.    Gastrointestinal:  Negative for diarrhea, nausea and vomiting.   Genitourinary:         Does not make any urine   Psychiatric/Behavioral:  Negative for confusion.    Objective:     Vital Signs (Most Recent):  Temp: 97.9 °F (36.6 °C) (03/03/22 1548)  Pulse: (!) 118 (03/03/22 1548)  Resp: 18 (03/03/22 1548)  BP: 131/63 (03/03/22 1548)  SpO2: 97 % (03/03/22 1548)   Vital Signs (24h Range):  Temp:  [97.8 °F (36.6 °C)-98 °F (36.7 °C)] 97.9 °F (36.6 °C)  Pulse:  [] 118  Resp:  [13-36] 18  SpO2:  [91 %-100 %] 97 %  BP: ()/(55-79) 131/63     Weight: 59.3 kg (130 lb 11.7 oz)  Body mass index is 21.76 kg/m².    Intake/Output Summary (Last 24 hours) at 3/3/2022 1700  Last data filed at 3/3/2022 1600  Gross per 24 hour   Intake 640 ml   Output 0 ml   Net 640 ml      Physical Exam  Vitals  and nursing note reviewed.   Constitutional:       Comments: Elderly frail, chronically ill-appearing female, sitting out in wheelchair   HENT:      Mouth/Throat:      Mouth: Mucous membranes are moist.   Cardiovascular:      Rate and Rhythm: Tachycardia present. Rhythm irregular.      Heart sounds: Murmur heard.   Pulmonary:      Comments: On supplemental O2 via NC  Abdominal:      General: Bowel sounds are normal. There is no distension.      Palpations: Abdomen is soft.      Tenderness: There is no abdominal tenderness. There is no guarding.   Skin:     Comments: Right upper extremity AVF present   Neurological:      Mental Status: She is alert and oriented to person, place, and time.   Psychiatric:         Mood and Affect: Mood normal.       Significant Labs: Blood Culture:   Recent Labs   Lab 03/02/22  1347 03/02/22  1348   LABBLOO No Growth to date  No Growth to date No Growth to date  No Growth to date     BMP:   Recent Labs   Lab 03/03/22  0431   GLU 81   *   K 4.0   CL 98   CO2 22*   BUN 28*   CREATININE 4.8*   CALCIUM 8.1*     CBC:   Recent Labs   Lab 03/02/22  1251 03/03/22  0431   WBC 4.76  4.76 4.57   HGB 11.1*  11.1* 11.0*   HCT 35.6*  35.6* 34.9*   *  143* 118*     CMP:   Recent Labs   Lab 03/02/22  1251 03/03/22  0431   *  135* 135*   K 4.1  4.1 4.0   CL 96  96 98   CO2 26  26 22*   *  142* 81   BUN 21  21 28*   CREATININE 4.2*  4.2* 4.8*   CALCIUM 8.8  8.8 8.1*   PROT 8.0  8.0 7.3   ALBUMIN 3.5  3.5 3.2*   BILITOT 0.6  0.6 0.8   ALKPHOS 77  77 62   AST 17  17 15   ALT 9*  9* 9*   ANIONGAP 13  13 15   EGFRNONAA 9.7*  9.7* 8.3*     Cardiac Markers:   Recent Labs   Lab 03/02/22  1251   BNP >4,500*     Lactic Acid:   Recent Labs   Lab 03/02/22  1251 03/03/22  0432   LACTATE 2.3* 1.9     Magnesium: No results for input(s): MG in the last 48 hours.  POCT Glucose: No results for input(s): POCTGLUCOSE in the last 48 hours.  Respiratory Culture: No results for  input(s): GSRESP, RESPIRATORYC in the last 48 hours.  Troponin:   Recent Labs   Lab 03/02/22  1251 03/02/22  1843 03/03/22  0432   TROPONINI 0.034  0.034 0.032 0.036     TSH:   Recent Labs   Lab 01/23/22  0023   TSH 3.610     Urine Culture: No results for input(s): LABURIN in the last 48 hours.  Urine Studies: No results for input(s): COLORU, APPEARANCEUA, PHUR, SPECGRAV, PROTEINUA, GLUCUA, KETONESU, BILIRUBINUA, OCCULTUA, NITRITE, UROBILINOGEN, LEUKOCYTESUR, RBCUA, WBCUA, BACTERIA, SQUAMEPITHEL, HYALINECASTS in the last 48 hours.    Invalid input(s): WRIGHTSUR    Significant Imaging: I have reviewed and interpreted all pertinent imaging results/findings within the past 24 hours.    X-Ray Chest 1 View    Result Date: 3/3/2022  Reason: wheezing FINDINGS: PA and lateral chest with comparison chest x-ray March 2, 2022 show unchanged enlarged cardiomediastinal silhouette. Prosthetic mitral valve again noted. There is redemonstration of bilateral mixed interstitial and airspace lung opacities which are mildly increased in the right lung. Prominence of the central hilar vascular structures again noted. No acute osseous abnormality. IMPRESSION: 1.  Redemonstration of bilateral mixed interstitial and airspace lung opacities which have mildly increased in the right lung. 2.  Unchanged enlarged cardiomediastinal silhouette. Electronically signed by:  Yang Chamorro DO  3/3/2022 2:48 PM CST Workstation: [id number]    X-Ray Chest AP Portable    Result Date: 3/2/2022  Reason: COVID-19 FINDINGS: PA and lateral chest with comparison chest x-ray January 24, 2022 show an enlarged cardiomediastinal silhouette is unchanged. Hazy opacification of the right lung noted with blunting of the costophrenic angle and obscuration of the hemidiaphragm. Bilateral diffuse groundglass and interstitial lung opacities are noted. These findings are all similar to previous exam. No acute osseous abnormality. IMPRESSION: 1.  Enlarged cardiac  mediastinal silhouette diffuse interstitial and groundglass lung opacities likely reflects CHF with pulmonary edema. 2.  Right pleural effusion with overlying compressive atelectasis or infiltrate is unchanged. Electronically signed by:  Yang Chamorro DO  3/2/2022 1:02 PM CST Workstation: [id number]           Assessment/Plan:      Active Hospital Problems    Diagnosis    *Chronic atrial fibrillation, now with RVR    Valvular heart disease    Chronic hypoxemic respiratory failure     4 L      Subtherapeutic anticoagulation    Long term current use of anticoagulant    Calciphylaxis    H/O mitral valve replacement    Chronic systolic heart failure    HLD (hyperlipidemia)    PVD (peripheral vascular disease)    DNR (do not resuscitate)    COPD (chronic obstructive pulmonary disease)    Pulmonary hypertension    Anemia due to chronic kidney disease     Likely of chronic disease.       ESRD (end stage renal disease) on HD M,W, F    Tobacco dependence    HTN (hypertension)     Plan:  Continue care in progressive care unit with continuous cardiac monitoring  Continue amiodarone drip as per Cardiology, will discuss if digoxin is an option  Continue metoprolol and short release Cardizem p.o.  Increase Coumadin 5 mg tonight, repeat INR tomorrow, monitoring closely for bleeding  Continue home chronic O2 requirements, 4-5 L  Echo with EF of 25% with diastolic dysfunction with moderate TR/MA  Dialysis as per Nephrology, does have pulmonary edema, volume overload, usual schedule MWF  Not interested in smoking cessation counseling  Renally dosing all medications  A.m. labs ordered  Appreciate all consultant's input  Further plan as per clinical course    VTE Risk Mitigation (From admission, onward)         Ordered     warfarin (COUMADIN) tablet 5 mg  Daily         03/03/22 1643     warfarin (COUMADIN) tablet 5 mg  Once         03/03/22 1653     Reason for No Pharmacological VTE Prophylaxis  Once        Question:   Reasons:  Answer:  Already adequately anticoagulated on oral Anticoagulants    03/02/22 1724     IP VTE HIGH RISK PATIENT  Once         03/02/22 1724                Discharge Planning   SIENNA: 3/4/2022     Code Status: DNR   Is the patient medically ready for discharge?:     Reason for patient still in hospital (select all that apply): Patient trending condition                     Lissa Portillo MD  Department of Hospital Medicine   [facility]

## 2022-03-03 NOTE — HPI
Griselda Neely is a 76 y.o. 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 The patient presented to [facility] on 3/2/2022 with a primary complaint of Shortness of Breath (Sob w/ vomiting and diarrhea x 2 weeks.)  .   76-year-old  female presents to emergency room with shortness of breath vomiting and diarrhea for the past 2 weeks     Past medical history significant for atrial fibrillation, chronic systolic diastolic heart failure, end-stage renal disease with hemodialysis 3 times a week, calciphylaxis, hypertension with episodes of hypotension, chronic pancreatitis, mitral valve replacement and pulmonary hypertension.     The patient is a DNR status     The patient states for the past 2 weeks she has been having intermittent nausea vomiting and diarrhea.  She states currently she feels better and she came to the hospital today because of her shortness of breath.  She usually requires 4 L of oxygen at home patient states wishes when her oxygen oxygen saturations are stable.     The patient states she normally dialyzed 3 times a week and she has been compliant with her hemodialysis sessions her last hemodialysis was today for her full-time     None unless she is concerned about her increased shortness of breath     Emergency room she was found to be in atrial fibrillation with a rapid ventricular response.  She also had evidence of pulmonary edema.  She was given a dose of IV Lasix in the emergency room placed on a Cardizem drip.  She became hypotensive and the Cardizem drip was discontinued per recommendations by my attending and the patient was placed on amiodarone.  The  patient is anticoagulated with Coumadin and the INR level is pending

## 2022-03-03 NOTE — CONSULTS
[facility]  Consult Note  Nephrology    Patient Name: Griselda Neely  Age: female 76 y.o. 1945  Consult Requested By: Lissa Portillo MD  Reason for Consult: ESRD on iHD Evaluation and Management    SUBJECTIVE:     History of Present Illness:  Griselda Neely is a 76 y.o. female with pertinent medical history of hypertension, MVR, PVD, h/o GOUT, CHFrEF, osteoarthritis, recurrent pancreatitis and ESRD on iHD w/ MWF schedule @ [facility], followed by Dr. Ingram. Patient last completed full dialysis treatment on 3/2/22 and per unit charge, patient had been doing well at dialysis during last several treatments. Last treatment on Wednesday with no apparent complication. Patient presented to ED after dialysis for complaint of palpitation and acute on chronic shortness of breath (wears 4L O2 at home). She was found to be in Afib w/ RVR. Initially managed with CCB gtt, however, developed hypotension and then switched to IV amiodarone. Lab assessment consistent with ESRD. CXR with pulmonary edema in setting of afib w/ RVR. Nephrology consulted for ESRD on iHD evaluation and management.     Review Of Systems:  Review of Systems   Constitutional: Negative for chills, fever and weight loss.   HENT: Negative for congestion, ear pain and sore throat.    Eyes: Negative for blurred vision and pain.   Respiratory: Positive for cough and shortness of breath. Negative for wheezing.    Cardiovascular: Positive for chest pain and palpitations. Negative for orthopnea and leg swelling.   Gastrointestinal: Positive for diarrhea. Negative for abdominal pain, constipation, heartburn and nausea.   Genitourinary: Negative for dysuria and hematuria.   Musculoskeletal: Negative for back pain, joint pain and myalgias.   Skin: Negative for rash.   Neurological: Negative for tingling, sensory change, focal weakness, weakness and headaches.   Endo/Heme/Allergies: Negative for polydipsia.   Psychiatric/Behavioral:  Negative for depression and substance abuse.         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    both legs    CHF (congestive heart failure)     Encounter for blood transfusion 03/2016    Gout     Hemodialysis access, AV graft     Hypertension     Mitral valve regurgitation     Osteoarthritis     Pancreatitis     Peripheral vascular disease     Pneumonia 09/09/2017    Renal failure      Past Surgical History:   Procedure Laterality Date    ACHILLES TENDON SURGERY Right     ANGIOGRAPHY OF ARTERIOVENOUS SHUNT Right 1/7/2020    Procedure: Fistulogram with Possible Intervention;  Surgeon: Joseph Loyola MD;  Location: [facility] CATH/EP LAB;  Service: Cardiology;  Laterality: Right;    ANGIOGRAPHY OF LOWER EXTREMITY Left 3/18/2020    Procedure: Angiogram Extremity Unilateral;  Surgeon: Ali Khoobehi, MD;  Location: [facility] CATH/EP LAB;  Service: Cardiology;  Laterality: Left;    APPENDECTOMY      CARDIAC CATHETERIZATION  07/03/2017    CHOLECYSTECTOMY      COLONOSCOPY Left 10/4/2020    Procedure: COLONOSCOPY;  Surgeon: Jordan Kilpatrick MD;  Location: [facility];  Service: Endoscopy;  Laterality: Left;    ESOPHAGOGASTRODUODENOSCOPY Left 8/17/2020    Procedure: EGD (ESOPHAGOGASTRODUODENOSCOPY);  Surgeon: Talat Trevizo MD;  Location: [facility];  Service: Endoscopy;  Laterality: Left;    ESOPHAGOGASTRODUODENOSCOPY Left 10/2/2020    Procedure: EGD (ESOPHAGOGASTRODUODENOSCOPY);  Surgeon: Talat Trevizo MD;  Location: [facility];  Service: Endoscopy;  Laterality: Left;    ESOPHAGOGASTRODUODENOSCOPY N/A 6/29/2021    Procedure: EGD (ESOPHAGOGASTRODUODENOSCOPY);  Surgeon: Sudheer Brown III, MD;  Location: [facility];  Service: Endoscopy;  Laterality: N/A;    heal surgery Right     HYSTERECTOMY      INSERTION OF STENT INTO PERIPHERAL VESSEL N/A 1/7/2020    Procedure: INSERTION, STENT, VESSEL, PERIPHERAL;  Surgeon: Joseph Loyola MD;  Location:  [facility] CATH/EP LAB;  Service: Cardiology;  Laterality: N/A;    MITRAL VALVE REPLACEMENT      PARATHYROIDECTOMY      PARATHYROIDECTOMY  07/13/2017    PERCUTANEOUS TRANSLUMINAL ANGIOPLASTY OF ARTERIOVENOUS FISTULA N/A 1/7/2020    Procedure: PTA, AV FISTULA;  Surgeon: Joseph Loyola MD;  Location: [facility] CATH/EP LAB;  Service: Cardiology;  Laterality: N/A;    PERITONEAL CATHETER INSERTION      RENAL BIOPSY      SMALL BOWEL ENTEROSCOPY N/A 12/2/2020    Procedure: ENTEROSCOPY;  Surgeon: Sudheer Brown III, MD;  Location: [facility] ENDO;  Service: Endoscopy;  Laterality: N/A;    vocal cord nodule      WOUND DEBRIDEMENT Left 7/13/2020    Procedure: DEBRIDEMENT, WOUND;  Surgeon: Carlos Elam III, MD;  Location: [facility] OR;  Service: General;  Laterality: Left;     Family History   Problem Relation Age of Onset    Heart disease Sister     Early death Sister         heart as baby    Heart disease Maternal Grandfather     Diabetes Mother     Hypertension Mother     Heart failure Mother     Heart disease Mother     Arthritis Mother     Diabetes Father     Early death Sister         infant     Social History     Tobacco Use    Smoking status: Current Some Day Smoker     Packs/day: 0.50     Years: 45.00     Pack years: 22.50     Types: Cigarettes    Smokeless tobacco: Never Used   Substance Use Topics    Alcohol use: No    Drug use: No      Review of patient's allergies indicates:  No Known Allergies   Prior to Admission medications    Medication Sig Start Date End Date Taking? Authorizing Provider   albuterol (ACCUNEB) 1.25 mg/3 mL Nebu Take 1.25 mg by nebulization every 6 (six) hours as needed. Rescue  HCS   Yes Historical Provider   albuterol (PROVENTIL/VENTOLIN HFA) 90 mcg/actuation inhaler Inhale 1 puff into the lungs every 4 (four) hours as needed for Wheezing. rescue 12/11/20  Yes Historical Provider   aspirin (ECOTRIN) 81 MG EC tablet Take 81 mg by mouth once daily.   Yes Historical Provider    diltiaZEM (CARDIZEM) 30 MG tablet Take 1 tablet (30 mg total) by mouth 4 (four) times daily before meals and nightly. 10/14/21 10/14/22 Yes Gabe Davis MD   hydrALAZINE (APRESOLINE) 50 MG tablet Take 1 tablet (50 mg total) by mouth every 8 (eight) hours. 12/31/20 12/31/21 Yes Kalie Neely MD   isosorbide mononitrate (IMDUR) 30 MG 24 hr tablet Take 1 tablet (30 mg total) by mouth once daily. 1/25/22  Yes Tosha Franklin PA-C   losartan (COZAAR) 25 MG tablet Take 1 tablet (25 mg total) by mouth every evening. 1/25/22  Yes Tosha Franklin PA-C   metoprolol succinate (TOPROL-XL) 25 MG 24 hr tablet Take 1 tablet by mouth nightly 1/25/22  Yes Mary Do NP   ondansetron (ZOFRAN) 4 MG tablet Take 1 tablet (4 mg total) by mouth every 6 (six) hours as needed for Nausea. 1/23/22  Yes Erna Cartagena MD   pantoprazole (PROTONIX) 40 MG tablet Take 1 tablet by mouth once daily 1/25/22  Yes Mary Do NP   warfarin (COUMADIN) 5 MG tablet Take 1 tablet (5 mg total) by mouth Daily.  Patient taking differently: Take 2.5 mg by mouth Daily. 1/27/22 1/27/23 Yes David Gaytan MD   calcium acetate (PHOSLO) 667 mg capsule Take 667 mg by mouth once daily.     Historical Provider   oxyCODONE-acetaminophen (PERCOCET) 5-325 mg per tablet Take 1 tablet by mouth every 4 (four) hours as needed for Pain. 1/23/22   Erna Cartagena MD           amiodarone in dextrose 5% 0.5 mg/min (03/03/22 1306)       aspirin  81 mg Oral Daily    calcium acetate(phosphat bind)  667 mg Oral Daily    diltiaZEM  30 mg Oral QID (AC & HS)    isosorbide mononitrate  30 mg Oral Daily    losartan  25 mg Oral QHS    metoprolol succinate  25 mg Oral Nightly    pantoprazole  40 mg Oral Daily    warfarin  2.5 mg Oral Daily     ipratropium, levalbuterol, melatonin, ondansetron, oxyCODONE-acetaminophen, sodium chloride 0.9%       OBJECTIVE:     Vital Signs (Most Recent)  Temp: 97.8 °F (36.6 °C) (03/03/22 0754)  Pulse: 104  (03/03/22 1128)  Resp: 18 (03/03/22 1128)  BP: 114/62 (03/03/22 1128)  SpO2: 95 % (03/03/22 1128)    Vital Signs Range (Last 24H):  Temp:  [97.8 °F (36.6 °C)-98 °F (36.7 °C)]   Pulse:  []   Resp:  [13-47]   BP: ()/(55-79)   SpO2:  [91 %-100 %]     I/O:    Intake/Output Summary (Last 24 hours) at 3/3/2022 1350  Last data filed at 3/2/2022 2000  Gross per 24 hour   Intake 160 ml   Output --   Net 160 ml        Physical Exam:  Physical Exam  Vitals and nursing note reviewed.   Constitutional:       General: She is not in acute distress.     Appearance: Normal appearance. She is ill-appearing.   HENT:      Head: Atraumatic.      Nose: Nose normal. No congestion.      Mouth/Throat:      Mouth: Mucous membranes are dry.      Pharynx: Oropharynx is clear. No oropharyngeal exudate.   Eyes:      Extraocular Movements: Extraocular movements intact.      Pupils: Pupils are equal, round, and reactive to light.   Cardiovascular:      Rate and Rhythm: Tachycardia present. Rhythm irregular.      Pulses: Normal pulses.      Heart sounds: No murmur heard.    No friction rub.   Pulmonary:      Effort: Pulmonary effort is normal.      Breath sounds: Rales (bilaterally) present.   Abdominal:      General: Abdomen is flat.      Palpations: Abdomen is soft.      Tenderness: There is no abdominal tenderness.   Musculoskeletal:         General: No swelling. Normal range of motion.      Cervical back: Normal range of motion. No rigidity.      Right lower leg: No edema.      Left lower leg: No edema.   Skin:     General: Skin is warm and dry.      Capillary Refill: Capillary refill takes less than 2 seconds.   Neurological:      General: No focal deficit present.      Mental Status: She is alert and oriented to person, place, and time.   Psychiatric:         Mood and Affect: Mood normal.         Behavior: Behavior normal.     Access: PAULA LYNN AVF w/ +thrill, pulsatile, +bruit    Laboratory:  Recent Labs   Lab 03/02/22  1251  03/03/22  0431   *  135* 135*   K 4.1  4.1 4.0   CL 96  96 98   CO2 26  26 22*   BUN 21  21 28*   CREATININE 4.2*  4.2* 4.8*   *  142* 81   CALCIUM 8.8  8.8 8.1*   CPK 50  --        Recent Labs   Lab 03/02/22  1251 03/03/22  0431   WBC 4.76  4.76 4.57   HGB 11.1*  11.1* 11.0*   HCT 35.6*  35.6* 34.9*   *  143* 118*       Anemia Evaluation  Lab Results   Component Value Date    HGB 11.0 (L) 03/03/2022    MCV 90 03/03/2022    FERRITIN 1,364 (H) 03/02/2022    FOLATE 4.5 08/08/2019    [id number] 1045 (H) 08/08/2019       CKD-MBD Evaluation  Lab Results   Component Value Date    WRRVVZPW77EV <13 (L) 05/30/2016    CALCIUM 8.1 (L) 03/03/2022    PHOS 5.1 (H) 01/27/2022    MG 1.9 01/27/2022    CO2 22 (L) 03/03/2022    ALBUMIN 3.2 (L) 03/03/2022       Imaging:  Imaging Results          X-Ray Chest AP Portable (Final result)  Result time 03/02/22 13:02:47    Final result by Yang Chamorro MD (03/02/22 13:02:47)                 Narrative:    Reason: COVID-19    FINDINGS:    PA and lateral chest with comparison chest x-ray January 24, 2022 show an enlarged cardiomediastinal silhouette is unchanged.  Hazy opacification of the right lung noted with blunting of the costophrenic angle and obscuration of the hemidiaphragm. Bilateral diffuse groundglass and interstitial lung opacities are noted. These findings are all similar to previous exam. No acute osseous abnormality.    IMPRESSION:    1.  Enlarged cardiac mediastinal silhouette diffuse interstitial and groundglass lung opacities likely reflects CHF with pulmonary edema.  2.  Right pleural effusion with overlying compressive atelectasis or infiltrate is unchanged.    Electronically signed by:  Yang Chamorro DO  3/2/2022 1:02 PM CST Workstation: [id number]                                ASSESSMENT/PLAN:     Active Hospital Problems    Diagnosis  POA    *Paroxysmal atrial fibrillation with RVR [I48.0]  Unknown    Calciphylaxis [E83.59]  Yes     H/O mitral valve replacement [Z95.2]  Not Applicable     Chronic    HLD (hyperlipidemia) [E78.5]  Yes     Chronic    DNR (do not resuscitate) [Z66]  Yes     Chronic    COPD (chronic obstructive pulmonary disease) [J44.9]  Yes     Chronic    Pulmonary hypertension [I27.20]  Yes     Chronic    Anemia due to chronic kidney disease [N18.9, D63.1]  Yes     Likely of chronic disease.       ESRD (end stage renal disease) on HD M,W, F [N18.6]  Yes     Chronic      Resolved Hospital Problems   No resolved problems to display.      ESRD on iHD  MWF outpatient schedule  Completed full session of iHD yesterday  EDW recorded in clinic is 57kg  Offered UF session today with patient - patient refused and wants to run first in AM. Reports SOB is improving at this time.  Will follow closely and plan for iHD in AM     Hypertension  Toprol Xl 25mg QD  Losartan 25mg QD  Imdur 30mg QD  Diltizem 30mg BID  No changes at this time. Monitor with UF     Afib w/ RVR  Out of RVR on bedside tele  On IV Amiodarone  Cardiology consulted and following     Anemia  hgb at goal for ESRD. No need for DONNY at this time.     CKD/MBD/Nutrition  F/u phos level in AM; resume binders if tolerating PO  On calcitriol TID in clinic; will resume  nepro shakes when tolerating PO  Nephrocaps     Chronic lung disease  Continue home O2        Thank you for allowing us to consult on Griselda Neely.  Our group will follow-up. Patient to resume nephrology care with dialysis unit after discharge.     Ramiro Butler MD  [facility] Nephrology  03/03/2022

## 2022-03-03 NOTE — SUBJECTIVE & OBJECTIVE
Interval History:  Patient reports she was more short of breath on presentation but somewhat more stable now, she is on 4-5 L home oxygen, states she is still smoking some.  States she does not make any urine.  Reports she has to be cautious with her Coumadin as she does have history of bleeding.  Telemetry with atrial fibrillation, heart rates in the low 100s.  Labs with INR 1.4, BNP greater than 4500, potassium 4.  Previous echo with EF of 25% with diastolic dysfunction with moderate TR/GA.  Discussed with patient.  No visitors at bedside.    Review of Systems   Constitutional:  Negative for fever.   Respiratory:  Positive for shortness of breath.    Gastrointestinal:  Negative for diarrhea, nausea and vomiting.   Genitourinary:         Does not make any urine   Psychiatric/Behavioral:  Negative for confusion.    Objective:     Vital Signs (Most Recent):  Temp: 97.9 °F (36.6 °C) (03/03/22 1548)  Pulse: (!) 118 (03/03/22 1548)  Resp: 18 (03/03/22 1548)  BP: 131/63 (03/03/22 1548)  SpO2: 97 % (03/03/22 1548)   Vital Signs (24h Range):  Temp:  [97.8 °F (36.6 °C)-98 °F (36.7 °C)] 97.9 °F (36.6 °C)  Pulse:  [] 118  Resp:  [13-36] 18  SpO2:  [91 %-100 %] 97 %  BP: ()/(55-79) 131/63     Weight: 59.3 kg (130 lb 11.7 oz)  Body mass index is 21.76 kg/m².    Intake/Output Summary (Last 24 hours) at 3/3/2022 1700  Last data filed at 3/3/2022 1600  Gross per 24 hour   Intake 640 ml   Output 0 ml   Net 640 ml      Physical Exam  Vitals and nursing note reviewed.   Constitutional:       Comments: Elderly frail, chronically ill-appearing female, sitting out in wheelchair   HENT:      Mouth/Throat:      Mouth: Mucous membranes are moist.   Cardiovascular:      Rate and Rhythm: Tachycardia present. Rhythm irregular.      Heart sounds: Murmur heard.   Pulmonary:      Comments: On supplemental O2 via NC  Abdominal:      General: Bowel sounds are normal. There is no distension.      Palpations: Abdomen is soft.       Tenderness: There is no abdominal tenderness. There is no guarding.   Skin:     Comments: Right upper extremity AVF present   Neurological:      Mental Status: She is alert and oriented to person, place, and time.   Psychiatric:         Mood and Affect: Mood normal.       Significant Labs: Blood Culture:   Recent Labs   Lab 03/02/22  1347 03/02/22  1348   LABBLOO No Growth to date  No Growth to date No Growth to date  No Growth to date     BMP:   Recent Labs   Lab 03/03/22  0431   GLU 81   *   K 4.0   CL 98   CO2 22*   BUN 28*   CREATININE 4.8*   CALCIUM 8.1*     CBC:   Recent Labs   Lab 03/02/22  1251 03/03/22  0431   WBC 4.76  4.76 4.57   HGB 11.1*  11.1* 11.0*   HCT 35.6*  35.6* 34.9*   *  143* 118*     CMP:   Recent Labs   Lab 03/02/22  1251 03/03/22  0431   *  135* 135*   K 4.1  4.1 4.0   CL 96  96 98   CO2 26  26 22*   *  142* 81   BUN 21  21 28*   CREATININE 4.2*  4.2* 4.8*   CALCIUM 8.8  8.8 8.1*   PROT 8.0  8.0 7.3   ALBUMIN 3.5  3.5 3.2*   BILITOT 0.6  0.6 0.8   ALKPHOS 77  77 62   AST 17  17 15   ALT 9*  9* 9*   ANIONGAP 13  13 15   EGFRNONAA 9.7*  9.7* 8.3*     Cardiac Markers:   Recent Labs   Lab 03/02/22  1251   BNP >4,500*     Lactic Acid:   Recent Labs   Lab 03/02/22  1251 03/03/22  0432   LACTATE 2.3* 1.9     Magnesium: No results for input(s): MG in the last 48 hours.  POCT Glucose: No results for input(s): POCTGLUCOSE in the last 48 hours.  Respiratory Culture: No results for input(s): GSRESP, RESPIRATORYC in the last 48 hours.  Troponin:   Recent Labs   Lab 03/02/22  1251 03/02/22  1843 03/03/22  0432   TROPONINI 0.034  0.034 0.032 0.036     TSH:   Recent Labs   Lab 01/23/22  0023   TSH 3.610     Urine Culture: No results for input(s): LABURIN in the last 48 hours.  Urine Studies: No results for input(s): COLORU, APPEARANCEUA, PHUR, SPECGRAV, PROTEINUA, GLUCUA, KETONESU, BILIRUBINUA, OCCULTUA, NITRITE, UROBILINOGEN, LEUKOCYTESUR, RBCUA, WBCUA,  BACTERIA, SQUAMEPITHEL, HYALINECASTS in the last 48 hours.    Invalid input(s): BRITTANY    Significant Imaging: I have reviewed and interpreted all pertinent imaging results/findings within the past 24 hours.    X-Ray Chest 1 View    Result Date: 3/3/2022  Reason: wheezing FINDINGS: PA and lateral chest with comparison chest x-ray March 2, 2022 show unchanged enlarged cardiomediastinal silhouette. Prosthetic mitral valve again noted. There is redemonstration of bilateral mixed interstitial and airspace lung opacities which are mildly increased in the right lung. Prominence of the central hilar vascular structures again noted. No acute osseous abnormality. IMPRESSION: 1.  Redemonstration of bilateral mixed interstitial and airspace lung opacities which have mildly increased in the right lung. 2.  Unchanged enlarged cardiomediastinal silhouette. Electronically signed by:  Yang Chamorro DO  3/3/2022 2:48 PM CST Workstation: [id number]    X-Ray Chest AP Portable    Result Date: 3/2/2022  Reason: COVID-19 FINDINGS: PA and lateral chest with comparison chest x-ray January 24, 2022 show an enlarged cardiomediastinal silhouette is unchanged. Hazy opacification of the right lung noted with blunting of the costophrenic angle and obscuration of the hemidiaphragm. Bilateral diffuse groundglass and interstitial lung opacities are noted. These findings are all similar to previous exam. No acute osseous abnormality. IMPRESSION: 1.  Enlarged cardiac mediastinal silhouette diffuse interstitial and groundglass lung opacities likely reflects CHF with pulmonary edema. 2.  Right pleural effusion with overlying compressive atelectasis or infiltrate is unchanged. Electronically signed by:  Yang Chamorro DO  3/2/2022 1:02 PM CST Workstation: [id number]

## 2022-03-04 PROBLEM — N18.9 ANEMIA DUE TO CHRONIC KIDNEY DISEASE: Status: RESOLVED | Noted: 2017-07-25 | Resolved: 2022-01-01

## 2022-03-04 PROBLEM — D63.1 ANEMIA DUE TO CHRONIC KIDNEY DISEASE: Status: RESOLVED | Noted: 2017-07-25 | Resolved: 2022-01-01

## 2022-03-04 PROBLEM — E83.59 CALCIPHYLAXIS: Status: RESOLVED | Noted: 2020-06-05 | Resolved: 2022-01-01

## 2022-03-04 NOTE — PLAN OF CARE
Plan of care, medications and safety reviewed with patient.      Problem: Adult Inpatient Plan of Care  Goal: Plan of Care Review  Outcome: Ongoing, Progressing  Goal: Patient-Specific Goal (Individualized)  Outcome: Ongoing, Progressing  Goal: Absence of Hospital-Acquired Illness or Injury  Outcome: Ongoing, Progressing  Goal: Optimal Comfort and Wellbeing  Outcome: Ongoing, Progressing  Goal: Readiness for Transition of Care  Outcome: Ongoing, Progressing     Problem: Fluid Imbalance (Pneumonia)  Goal: Fluid Balance  Outcome: Ongoing, Progressing     Problem: Infection (Pneumonia)  Goal: Resolution of Infection Signs and Symptoms  Outcome: Ongoing, Progressing     Problem: Respiratory Compromise (Pneumonia)  Goal: Effective Oxygenation and Ventilation  Outcome: Ongoing, Progressing     Problem: Skin Injury Risk Increased  Goal: Skin Health and Integrity  Outcome: Ongoing, Progressing     Problem: Fall Injury Risk  Goal: Absence of Fall and Fall-Related Injury  Outcome: Ongoing, Progressing     Problem: Device-Related Complication Risk (Hemodialysis)  Goal: Safe, Effective Therapy Delivery  Outcome: Ongoing, Progressing     Problem: Hemodynamic Instability (Hemodialysis)  Goal: Effective Tissue Perfusion  Outcome: Ongoing, Progressing     Problem: Infection (Hemodialysis)  Goal: Absence of Infection Signs and Symptoms  Outcome: Ongoing, Progressing

## 2022-03-04 NOTE — PLAN OF CARE
Patient to resume services with [facility].  Per liaison Rosa, patient will be seen Monday, 3/7/22.       03/04/22 5283   Final Note   Assessment Type Final Discharge Note   Anticipated Discharge Disposition Home-Health   Hospital Resources/Appts/Education Provided Post-Acute resouces added to AVS   Post-Acute Status   Post-Acute Authorization Home Health   Home Health Status Set-up Complete/Auth obtained

## 2022-03-04 NOTE — NURSING
Patient assisted to chair from bed several times. She has tried o call her sister and her son thru the night for various reasons. She reported difficulty breathing. O2 sats 97%. Patient requesting anxiety med. Dr. Long notified and RT was called for a treatment.

## 2022-03-04 NOTE — DISCHARGE INSTRUCTIONS
Plan of care and future appointments reviewed with patient, as well as indications to return to the emergency room.

## 2022-03-04 NOTE — PLAN OF CARE
Problem: Adult Inpatient Plan of Care  Goal: Plan of Care Review  Outcome: Ongoing, Not Progressing  Goal: Patient-Specific Goal (Individualized)  Outcome: Ongoing, Not Progressing  Goal: Absence of Hospital-Acquired Illness or Injury  Outcome: Ongoing, Not Progressing  Goal: Optimal Comfort and Wellbeing  Outcome: Ongoing, Not Progressing  Goal: Readiness for Transition of Care  Outcome: Ongoing, Not Progressing     Problem: Fluid Imbalance (Pneumonia)  Goal: Fluid Balance  Outcome: Ongoing, Not Progressing     Problem: Infection (Pneumonia)  Goal: Resolution of Infection Signs and Symptoms  Outcome: Ongoing, Not Progressing     Problem: Respiratory Compromise (Pneumonia)  Goal: Effective Oxygenation and Ventilation  Outcome: Ongoing, Not Progressing     Problem: Skin Injury Risk Increased  Goal: Skin Health and Integrity  Outcome: Ongoing, Not Progressing     Problem: Fall Injury Risk  Goal: Absence of Fall and Fall-Related Injury  Outcome: Ongoing, Not Progressing     Problem: Device-Related Complication Risk (Hemodialysis)  Goal: Safe, Effective Therapy Delivery  Outcome: Ongoing, Not Progressing     Problem: Hemodynamic Instability (Hemodialysis)  Goal: Effective Tissue Perfusion  Outcome: Ongoing, Not Progressing     Problem: Infection (Hemodialysis)  Goal: Absence of Infection Signs and Symptoms  Outcome: Ongoing, Not Progressing

## 2022-03-04 NOTE — PLAN OF CARE
[facility]  Initial Discharge Assessment       Primary Care Provider: Kalie Neely MD    Admission Diagnosis: Atrial fibrillation with RVR [I48.91]    Admission Date: 3/2/2022  Expected Discharge Date: 3/4/2022    Discharge Barriers Identified: (P) None  Assessment completed over the phone with patient's sister, Aide Watson, [phone]. Patient lives with her sister and the plan is to return home. Patient is on oxygen and wants to resume care with [facility], [phone]. Confirmed patient is active with Nakul at facility. Patient receives home health from [facility] once a week, confirmed with facility that patient is active, patient wants to resume care. Patient does not have a POA or AD. Patient is requesting a quad cane upon discharge.     Payor: [employer] MEDICARE / Plan: [employer] MEDICARE HMO / Product Type: Capitation /     Extended Emergency Contact Information  Primary Emergency Contact: Aide Watson  Address: [address] [place] [address] [place]  Mobile Phone: [phone]  Relation: Sister  Secondary Emergency Contact: Elvi iH  Address: UNKNOWN   [place]  Mobile Phone: [phone]  Relation: Sister    Discharge Plan A: (P) Home with family  Discharge Plan B: (P) Home with family      [facility] - [place], [place] - [address]  [address] [address]  Phone: [phone] Fax: [phone]    [facility] - [facility] [address]  [address] [address]  Phone: [phone] Fax: [phone]    [facility]  [address]  [facility] [address]  Phone: [phone] Fax: [phone]      Initial Assessment (most recent)       Adult Discharge Assessment - 03/04/22 1046          Discharge Assessment    Assessment Type Discharge Planning Assessment     Confirmed/corrected address, phone  number and insurance Yes (P)      Source of Information family (P)      If unable to respond/provide information was family/caregiver contacted? Yes (P)      Contact Name/Number Aide Watson, [phone] (P)      When was your last doctors appointment? -- (P)    unknown    Communicated SIENNA with patient/caregiver Date not available/Unable to determine (P)      Reason For Admission couldn't breathe (P)      Lives With sibling(s) (P)      Do you expect to return to your current living situation? Yes (P)      Do you have help at home or someone to help you manage your care at home? Yes (P)      Who are your caregiver(s) and their phone number(s)? Aide Gerberd, [phone] (P)      Prior to hospitilization cognitive status: Alert/Oriented (P)      Current cognitive status: Unable to Assess (P)      Walking or Climbing Stairs Difficulty ambulation difficulty, requires equipment (P)      Mobility Management cane, wheelchair (P)      Dressing/Bathing Difficulty none (P)      Home Accessibility wheelchair accessible (P)      Home Layout Able to live on 1st floor (P)      Equipment Currently Used at Home wheelchair;cane, quad;oxygen (P)      Readmission within 30 days? No (P)      Do you currently have service(s) that help you manage your care at home? Yes (P)      How Many hours does patient receive services -- (P)    patient is seen 1x a week    Name and Contact number of agency [facility], [phone] (P)      Is the pt/caregiver preference to resume services with current agency Yes (P)      Do you take prescription medications? Yes (P)      Do you have prescription coverage? Yes (P)      Coverage [employer] Medicare (P)      Do you have any problems affording any of your prescribed medications? No (P)      Is the patient taking medications as prescribed? yes (P)      Who is going to help you get home at discharge? Aide Gerberd, [phone] (P)      How do you get to doctors appointments? family or  friend will provide (P)      Are you on dialysis? Yes (P)      Dialysis Name and Scheduled days [facility], [phone], MWF; confirmed with Rashel at facility (P)      Do you take coumadin? Yes (P)      Who monitors your labs? [facility], [phone] (P)      Discharge Plan A Home with family (P)      Discharge Plan B Home with family (P)      DME Needed Upon Discharge  cane, quad;oxygen (P)      Discharge Plan discussed with: Sibling (P)      Name(s) and Number(s) Aide Watson, [phone] (P)      Discharge Barriers Identified None (P)

## 2022-03-04 NOTE — DISCHARGE SUMMARY
[facility] Medicine  Discharge Summary      Patient Name: Griselda Neely  MRN: [id number]  Patient Class: IP- Inpatient  Admission Date: 3/2/2022  Hospital Length of Stay: 2 days  Discharge Date and Time:  03/04/2022 3:11 PM  Attending Physician: Homer Carlson MD   Discharging Provider: Homer Carlson MD  Primary Care Provider: Kalie Neely MD      HPI:   Griselda Neely is a 76 y.o. 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 The patient presented to [facility] on 3/2/2022 with a primary complaint of Shortness of Breath (Sob w/ vomiting and diarrhea x 2 weeks.)  .   76-year-old  female presents to emergency room with shortness of breath vomiting and diarrhea for the past 2 weeks     Past medical history significant for atrial fibrillation, chronic systolic diastolic heart failure, end-stage renal disease with hemodialysis 3 times a week, calciphylaxis, hypertension with episodes of hypotension, chronic pancreatitis, mitral valve replacement and pulmonary hypertension.     The patient is a DNR status     The patient states for the past 2 weeks she has been having intermittent nausea vomiting and diarrhea.  She states currently she feels better and she came to the hospital today because of her shortness of breath.  She usually requires 4 L of oxygen at home patient states wishes when her oxygen oxygen saturations are stable.     The patient states she normally dialyzed 3 times a week and she has been compliant with her hemodialysis sessions her last hemodialysis was today for her full-time     None unless she is concerned about her increased  shortness of breath     Emergency room she was found to be in atrial fibrillation with a rapid ventricular response.  She also had evidence of pulmonary edema.  She was given a dose of IV Lasix in the emergency room placed on a Cardizem drip.  She became hypotensive and the Cardizem drip was discontinued per recommendations by my attending and the patient was placed on amiodarone.  The patient is anticoagulated with Coumadin and the INR level is pending      * No surgery found *      Hospital Course:   Patient is a 76-year-old female with known essential hypertension, history of bioprosthetic mitral valve replacement, chronic atrial fibrillation, chronic systolic congestive heart failure and ESRD on hemodialysis admitted after presenting with palpitations and shortness of breath.  Also she has chronic hypoxic respiratory failure on 4 L of O2 at home.  She was initially on diltiazem drip however this was stopped due to hypotension after which she was switched to amiodarone.  Evaluated by Cardiology.  However given her chronic atrial fibrillation rate control strategy was recommended.  Amiodarone has been discontinued.  Her RVR has resolved spontaneously.  Seen by Nephrology for dialysis needs.  Patient was found to be subtherapeutic; adjustments made to warfarin (dose increased to 5 mg daily).  She gets INR checks through [employer]; advised to get next INR check in 2 days from now.  Deemed medically stable to be discharged home.  Provided a short supply of lorazepam for anxiety.      Physical exam on the day of discharge:  General: Patient resting comfortably in no acute distress.  Frail.  Lungs:  No tachypnea or accessory muscle use.  Bibasilar diminished breath sounds  Cor:  Irregularly irregular.  Rate control.  No pedal edema.  Abd: Soft. Nontender. Non-distended.  Neuro: A&O x3. Moving all 4 extremities equally           Goals of Care Treatment Preferences:  Code Status: DNR      Consults:   Consults  (From admission, onward)        Status Ordering Provider     Inpatient consult to Social Work/Case Management  Once        Provider:  (Not yet assigned)    Acknowledged AMAN HIDALGO     Inpatient consult to Cardiology  Once        Provider:  Jerrod Asif MD    Completed CHERY NAVARRETE     Inpatient consult to Nephrology  Once        Provider:  Joseph Ingram MD    Completed CHERY NAVARRETE     Inpatient consult to Cardiology  Once        Provider:  Vel Asif MD    Acknowledged CHERY NAVARRETE     Inpatient consult to Nephrology  Once        Provider:  Brenda Morocho MD    Completed MICHAEL VEGA     Inpatient consult to Hospitalist  Once        Provider:  Chery Navarrete NP    Acknowledged KENDALL CHAIREZ          No new Assessment & Plan notes have been filed under this hospital service since the last note was generated.  Service: Hospital Medicine    Final Active Diagnoses:    Diagnosis Date Noted POA    PRINCIPAL PROBLEM:  Chronic atrial fibrillation [I48.20] 03/03/2022 Yes     Chronic    Valvular heart disease [I38] 03/03/2022 Yes     Chronic    Chronic hypoxemic respiratory failure [J96.11] 11/20/2021 Yes     Chronic    Subtherapeutic anticoagulation [Z51.81, Z79.01] 11/16/2020 Not Applicable    Long term current use of anticoagulant [Z79.01] 09/05/2020 Not Applicable     Chronic    H/O mitral valve replacement [Z95.2] 03/17/2020 Not Applicable     Chronic    Chronic systolic heart failure [I50.22] 02/03/2020 Yes     Chronic    HLD (hyperlipidemia) [E78.5] 12/20/2019 Yes     Chronic    PVD (peripheral vascular disease) [I73.9] 12/20/2019 Yes     Chronic    DNR (do not resuscitate) [Z66] 09/30/2019 Yes     Chronic    COPD (chronic obstructive pulmonary disease) [J44.9] 09/29/2019 Yes     Chronic    Pulmonary hypertension [I27.20] 07/25/2017 Yes     Chronic    ESRD (end stage renal disease) on HD M,W, F [N18.6] 05/11/2016 Yes     Chronic    Tobacco dependence [F17.200] 09/09/2013  Yes     Chronic    HTN (hypertension) [I10] 08/15/2013 Yes     Chronic      Problems Resolved During this Admission:    Diagnosis Date Noted Date Resolved POA    Calciphylaxis [E83.59] 06/05/2020 03/04/2022 Yes    Anemia due to chronic kidney disease [N18.9, D63.1] 07/25/2017 03/04/2022 Yes       Discharged Condition: stable    [place] [facility]    [place] Up:   Follow-up Information     Kalie Neely MD Follow up in 4 week(s).    Specialty: Family Medicine  Why: As needed, If symptoms worsen  Contact information:  [address]  [address]  [facility] [address]  [phone]             Joseph Ingram MD Follow up in 1 week(s).    Specialty: Nephrology  Why: Resume dialysis as per schedule  Contact information:  [address]  [facility] [address]  [phone]                       Patient Instructions:      Ambulatory referral/consult to Home Health   Standing Status: Future   Referral Priority: Routine Referral Type: Home Health   Referral Reason: Specialty Services Required   Requested Specialty: Home Health Services   Number of Visits Requested: 1     Diet renal     Notify your health care provider if you experience any of the following:  severe uncontrolled pain     Notify your health care provider if you experience any of the following:  difficulty breathing or increased cough     Notify your health care provider if you experience any of the following:  persistent dizziness, light-headedness, or visual disturbances     Notify your health care provider if you experience any of the following:  increased confusion or weakness     Activity as tolerated       Significant Diagnostic Studies: Labs:   CMP   Recent Labs   Lab 03/03/22  0431 03/04/22  0632   * 133*   K 4.0 4.7   CL 98 96   CO2 22* 22*   GLU 81 84   BUN 28* 37*   CREATININE 4.8* 6.0*   CALCIUM 8.1* 7.9*   PROT 7.3 7.6   ALBUMIN 3.2* 3.3*   BILITOT 0.8 0.7   ALKPHOS 62 78   AST 15 15   ALT 9* 11   ANIONGAP 15 15    ESTGFRAFRICA 9.6* 7.3*   EGFRNONAA 8.3* 6.3*   , CBC   Recent Labs   Lab 03/03/22  0431 03/04/22  0632   WBC 4.57 5.40   HGB 11.0* 11.3*   HCT 34.9* 36.2*   * 125*    and INR   Lab Results   Component Value Date    INR 1.6 03/04/2022    INR 1.4 03/03/2022    INR 1.6 03/02/2022       Pending Diagnostic Studies:     None         Medications:  Reconciled Home Medications:      Medication List      START taking these medications    calcitRIOL 0.5 MCG Cap  Commonly known as: ROCALTROL  Take 1 capsule (0.5 mcg total) by mouth every Mon, Wed, Fri.     LORazepam 0.5 MG tablet  Commonly known as: ATIVAN  Take 1 tablet (0.5 mg total) by mouth every 12 (twelve) hours as needed for Anxiety.        CHANGE how you take these medications    warfarin 5 MG tablet  Commonly known as: COUMADIN  Take 1 tablet (5 mg total) by mouth Daily.  What changed: how much to take        CONTINUE taking these medications    * albuterol 90 mcg/actuation inhaler  Commonly known as: PROVENTIL/VENTOLIN HFA  Inhale 1 puff into the lungs every 4 (four) hours as needed for Wheezing. rescue     * albuterol 1.25 mg/3 mL Nebu  Commonly known as: ACCUNEB  Take 1.25 mg by nebulization every 6 (six) hours as needed. Rescue  HCS     aspirin 81 MG EC tablet  Commonly known as: ECOTRIN  Take 81 mg by mouth once daily.     calcium acetate(phosphat bind) 667 mg capsule  Commonly known as: PHOSLO  Take 667 mg by mouth once daily.     diltiaZEM 30 MG tablet  Commonly known as: CARDIZEM  Take 1 tablet (30 mg total) by mouth 4 (four) times daily before meals and nightly.     isosorbide mononitrate 30 MG 24 hr tablet  Commonly known as: IMDUR  Take 1 tablet (30 mg total) by mouth once daily.     losartan 25 MG tablet  Commonly known as: COZAAR  Take 1 tablet (25 mg total) by mouth every evening.     metoprolol succinate 25 MG 24 hr tablet  Commonly known as: TOPROL-XL  Take 1 tablet by mouth nightly     ondansetron 4 MG tablet  Commonly known as: ZOFRAN  Take 1  tablet (4 mg total) by mouth every 6 (six) hours as needed for Nausea.     oxyCODONE-acetaminophen 5-325 mg per tablet  Commonly known as: PERCOCET  Take 1 tablet by mouth every 4 (four) hours as needed for Pain.     pantoprazole 40 MG tablet  Commonly known as: PROTONIX  Take 1 tablet by mouth once daily         * This list has 2 medication(s) that are the same as other medications prescribed for you. Read the directions carefully, and ask your doctor or other care provider to review them with you.            STOP taking these medications    hydrALAZINE 50 MG tablet  Commonly known as: APRESOLINE            Indwelling Lines/Drains at time of discharge:   Lines/Drains/Airways     Drain  Duration                Hemodialysis AV Fistula 08/08/19 0214 Right upper arm 939 days                Time spent on the discharge of patient: 35 minutes         Homer Carlson MD  Department of Hospital Medicine  [facility]

## 2022-03-04 NOTE — NURSING
"Patient called out for nurse saying that he feels bad and that something is wrong however, she can not pinpoint any symptoms. She's just saying "I feel bad." B/p 100/70, HR 91 bpm, afib, O2 sats 97%, RR18. Blood glucose 81. Patient did not eat dinner last night. A sandwhich tray, frozen dinner and snacks were offered. Patient declined. She said she's going back to bed. Needs within reach. Will continue to monitor.   "

## 2022-03-04 NOTE — HOSPITAL COURSE
Patient is a 76-year-old female with known essential hypertension, history of bioprosthetic mitral valve replacement, chronic atrial fibrillation, chronic systolic congestive heart failure and ESRD on hemodialysis admitted after presenting with palpitations and shortness of breath.  Also she has chronic hypoxic respiratory failure on 4 L of O2 at home.  She was initially on diltiazem drip however this was stopped due to hypotension after which she was switched to amiodarone.  Evaluated by Cardiology.  However given her chronic atrial fibrillation rate control strategy was recommended.  Amiodarone has been discontinued.  Her RVR has resolved spontaneously.  Seen by Nephrology for dialysis needs.  Patient was found to be subtherapeutic; adjustments made to warfarin (dose increased to 5 mg daily).  She gets INR checks through [employer]; advised to get next INR check in 2 days from now.  Deemed medically stable to be discharged home.  Provided a short supply of lorazepam for anxiety.      Physical exam on the day of discharge:  General: Patient resting comfortably in no acute distress.  Frail.  Lungs:  No tachypnea or accessory muscle use.  Bibasilar diminished breath sounds  Cor:  Irregularly irregular.  Rate control.  No pedal edema.  Abd: Soft. Nontender. Non-distended.  Neuro: A&O x3. Moving all 4 extremities equally

## 2022-03-04 NOTE — PROGRESS NOTES
Formerly [facility], 1928–1983  Progress Note  Nephrology    Patient Name: Griselda Neely  Age: female 76 y.o. 1945  Consult Requested By: Homer Carlson MD  Reason for Consult: ESRD on iHD Evaluation and Management    SUBJECTIVE:     History of Present Illness:  Griselda Neely is a 76 y.o. female with pertinent medical history of hypertension, MVR, PVD, h/o GOUT, CHFrEF, osteoarthritis, recurrent pancreatitis and ESRD on iHD w/ MWF schedule @ [facility], followed by Dr. Ingram. Patient last completed full dialysis treatment on 3/2/22 and per unit charge, patient had been doing well at dialysis during last several treatments. Last treatment on Wednesday with no apparent complication. Patient presented to ED after dialysis for complaint of palpitation and acute on chronic shortness of breath (wears 4L O2 at home). She was found to be in Afib w/ RVR. Initially managed with CCB gtt, however, developed hypotension and then switched to IV amiodarone. Lab assessment consistent with ESRD. CXR with pulmonary edema in setting of afib w/ RVR. Nephrology consulted for ESRD on iHD evaluation and management.     Interval History  Doing well today  Patient seen and evaluated during iHD rounds this afternoon. Patient tolerating current prescription. Reviewed prescription and modifications with iHD RN at bedside. UF to goal  Patient without new complaints. SOB improved.           amiodarone in dextrose 5% 0.5 mg/min (03/04/22 0209)       aspirin  81 mg Oral Daily    calcitRIOL  0.5 mcg Oral Every Mon, Wed, Fri    calcium acetate(phosphat bind)  667 mg Oral Daily    diltiaZEM  30 mg Oral QID (AC & HS)    isosorbide mononitrate  30 mg Oral Daily    losartan  25 mg Oral QHS    metoprolol succinate  25 mg Oral Nightly    mupirocin   Nasal BID    pantoprazole  40 mg Oral Daily    vit b cmplx 3-fa-vit c-biotin 1- mg-mg-mcg  1 tablet Oral Daily    warfarin  5 mg Oral Daily     acetaminophen, ipratropium,  levalbuterol, melatonin, ondansetron, oxyCODONE-acetaminophen, senna-docusate 8.6-50 mg, sodium chloride 0.9%       OBJECTIVE:     Vital Signs (Most Recent)  Temp: 97 °F (36.1 °C) (03/04/22 1300)  Pulse: 106 (03/04/22 1300)  Resp: 16 (03/04/22 1300)  BP: (!) 144/66 (03/04/22 1300)  SpO2: 96 % (03/04/22 1300)    Vital Signs Range (Last 24H):  Temp:  [97 °F (36.1 °C)-98.2 °F (36.8 °C)]   Pulse:  []   Resp:  [15-24]   BP: ()/(47-85)   SpO2:  [96 %-100 %]     I/O:    Intake/Output Summary (Last 24 hours) at 3/4/2022 1428  Last data filed at 3/4/2022 1300  Gross per 24 hour   Intake 500 ml   Output 2501 ml   Net -2001 ml        Physical Exam:  Physical Exam  Vitals and nursing note reviewed.   Constitutional:       General: She is not in acute distress.     Appearance: Normal appearance. She is ill-appearing.   HENT:      Head: Atraumatic.      Nose: Nose normal. No congestion.      Mouth/Throat:      Mouth: Mucous membranes are dry.      Pharynx: Oropharynx is clear. No oropharyngeal exudate.   Eyes:      Extraocular Movements: Extraocular movements intact.      Pupils: Pupils are equal, round, and reactive to light.   Cardiovascular:      Rate and Rhythm: Tachycardia present. Rhythm irregular.      Pulses: Normal pulses.      Heart sounds: No murmur heard.    No friction rub.   Pulmonary:      Effort: Pulmonary effort is normal.      Breath sounds: Rales (bilaterally) present.   Abdominal:      General: Abdomen is flat.      Palpations: Abdomen is soft.      Tenderness: There is no abdominal tenderness.   Musculoskeletal:         General: No swelling. Normal range of motion.      Cervical back: Normal range of motion. No rigidity.      Right lower leg: No edema.      Left lower leg: No edema.   Skin:     General: Skin is warm and dry.      Capillary Refill: Capillary refill takes less than 2 seconds.   Neurological:      General: No focal deficit present.      Mental Status: She is alert and oriented to  person, place, and time.   Psychiatric:         Mood and Affect: Mood normal.         Behavior: Behavior normal.     Access: LUE BB AVF w/ +thrill, pulsatile, +bruit    Laboratory:  Recent Labs   Lab 03/02/22  1251 03/03/22  0431 03/04/22  0632   *  135* 135* 133*   K 4.1  4.1 4.0 4.7   CL 96  96 98 96   CO2 26  26 22* 22*   BUN 21  21 28* 37*   CREATININE 4.2*  4.2* 4.8* 6.0*   *  142* 81 84   CALCIUM 8.8  8.8 8.1* 7.9*   PHOS  --   --  4.0   CPK 50  --   --        Recent Labs   Lab 03/02/22  1251 03/03/22  0431 03/04/22  0632   WBC 4.76  4.76 4.57 5.40   HGB 11.1*  11.1* 11.0* 11.3*   HCT 35.6*  35.6* 34.9* 36.2*   *  143* 118* 125*       Anemia Evaluation  Lab Results   Component Value Date    HGB 11.3 (L) 03/04/2022    MCV 93 03/04/2022    FERRITIN 1,364 (H) 03/02/2022    FOLATE 4.5 08/08/2019    KWBMVJTT71 1045 (H) 08/08/2019       CKD-MBD Evaluation  Lab Results   Component Value Date    YYSGTPLJ23JB <13 (L) 05/30/2016    CALCIUM 7.9 (L) 03/04/2022    PHOS 4.0 03/04/2022    MG 1.5 (L) 03/04/2022    CO2 22 (L) 03/04/2022    ALBUMIN 3.3 (L) 03/04/2022       Imaging:  Imaging Results          X-Ray Chest AP Portable (Final result)  Result time 03/02/22 13:02:47    Final result by Yang Chamorro MD (03/02/22 13:02:47)                 Narrative:    Reason: COVID-19    FINDINGS:    PA and lateral chest with comparison chest x-ray January 24, 2022 show an enlarged cardiomediastinal silhouette is unchanged.  Hazy opacification of the right lung noted with blunting of the costophrenic angle and obscuration of the hemidiaphragm. Bilateral diffuse groundglass and interstitial lung opacities are noted. These findings are all similar to previous exam. No acute osseous abnormality.    IMPRESSION:    1.  Enlarged cardiac mediastinal silhouette diffuse interstitial and groundglass lung opacities likely reflects CHF with pulmonary edema.  2.  Right pleural effusion with overlying compressive  atelectasis or infiltrate is unchanged.    Electronically signed by:  Yang Chamorro DO  3/2/2022 1:02 PM CST Workstation: [id number]                                ASSESSMENT/PLAN:     Active Hospital Problems    Diagnosis  POA    *Chronic atrial fibrillation, now with RVR [I48.20]  Yes     Chronic    Valvular heart disease [I38]  Yes     Chronic    Chronic hypoxemic respiratory failure [J96.11]  Yes     Chronic     4 L      Subtherapeutic anticoagulation [Z51.81, Z79.01]  Not Applicable    Long term current use of anticoagulant [Z79.01]  Not Applicable     Chronic    Calciphylaxis [E83.59]  Yes    H/O mitral valve replacement [Z95.2]  Not Applicable     Chronic    Chronic systolic heart failure [I50.22]  Yes     Chronic    HLD (hyperlipidemia) [E78.5]  Yes     Chronic    PVD (peripheral vascular disease) [I73.9]  Yes     Chronic    DNR (do not resuscitate) [Z66]  Yes     Chronic    COPD (chronic obstructive pulmonary disease) [J44.9]  Yes     Chronic    Pulmonary hypertension [I27.20]  Yes     Chronic    Anemia due to chronic kidney disease [N18.9, D63.1]  Yes     Likely of chronic disease.       ESRD (end stage renal disease) on HD M,W, F [N18.6]  Yes     Chronic    Tobacco dependence [F17.200]  Yes     Chronic    HTN (hypertension) [I10]  Yes     Chronic      Resolved Hospital Problems   No resolved problems to display.      ESRD on iHD  MWF outpatient schedule  Completed full session of iHD yesterday  EDW recorded in clinic is 57kg  iHD this AM.   If remains admitted, will plan for next session on Monday     Hypertension  Toprol Xl 25mg QD  Losartan 25mg QD  Imdur 30mg QD  Diltizem 30mg BID  No changes at this time. Monitor with UF     Afib w/ RVR  Not in RVR on bedside tele  On IV Amiodarone  Cardiology consulted and following     Anemia  hgb at goal for ESRD. No need for DONNY at this time.     CKD/MBD/Nutrition  Phos 4.0. resume binders if tolerating PO  On calcitriol TID in clinic; will  resume  nepro shakes when tolerating PO  Nephrocaps     Chronic lung disease  Continue home O2        Thank you for allowing us to consult on Griselda Neely.  Our group will follow-up. Patient to resume nephrology care with dialysis unit after discharge.     Ramiro Butler MD  [facility]  03/04/2022

## 2022-03-04 NOTE — PT/OT/SLP PROGRESS
Physical Therapy      Patient Name:  Griselda Neely   MRN:  [id number]    Patient not seen today secondary to Patient unwilling to participate (PT ordered because pt supposedly asked for a QC upon discharge. Attempted eval, but pt denied asking for QC and not interested in PT.). Will not follow-up.

## 2022-03-07 NOTE — TELEPHONE ENCOUNTER
----- Message from Margarettesteve Briscoe sent at 3/7/2022  2:53 PM CST -----  Rachel her sitter calling said she needs a HFU asap that she is still having fluid build up after getting dialysis. Said there nurse came out and took 1.6 liters of fluid off her today.  Cb # [phone] or [phone]

## 2022-03-07 NOTE — TELEPHONE ENCOUNTER
Spoke with Rachel, instructed per dr thomas to contact cardiology regarding swelling.  HFU visit scheduled 3/14/22 -DN

## 2022-03-07 NOTE — PROGRESS NOTES
Admitted 3/2-3/4. Hospital course per d/c summary: Patient is a 76-year-old female with known essential hypertension, history of bioprosthetic mitral valve replacement, chronic atrial fibrillation, chronic systolic congestive heart failure and ESRD on hemodialysis admitted after presenting with palpitations and shortness of breath.  Also she has chronic hypoxic respiratory failure on 4 L of O2 at home.  She was initially on diltiazem drip however this was stopped due to hypotension after which she was switched to amiodarone.  Evaluated by Cardiology.  However given her chronic atrial fibrillation rate control strategy was recommended.  Amiodarone has been discontinued.  Her RVR has resolved spontaneously.  Seen by Nephrology for dialysis needs.  Patient was found to be subtherapeutic; adjustments made to warfarin (dose increased to 5 mg daily).  She gets INR checks through [employer]; advised to get next INR check in 2 days from now.  Deemed medically stable to be discharged home.  Provided a short supply of lorazepam for anxiety.    Patient received increased doses during admission & dose was increased to 5mg daily because inpatient team unaware of goal range of 1.3-1.5 as set by Dr. Asif. Will recommend she take previous maintenance dose we have set for her & INR today.

## 2022-03-09 NOTE — PHYSICIAN QUERY
PT Name: Griselda Neely  MR #: [id number]     DOCUMENTATION CLARIFICATION     CDS/: Flavia Felton               Contact information:  This form is a permanent document in the medical record.     Query Date: March 9, 2022    By submitting this query, we are merely seeking further clarification of documentation.  Please utilize your independent clinical judgment when addressing the question(s) below.    The Medical Record contains the following   Indicators Supporting Clinical Findings Location in Medical Record    Heart Failure documented Acute on chronic heart failure combination of systolic and diastolic     Chronic systolic heart failure Consults by Jerrod Asif MD at 3/3/2022  9:16     3/4/2022  3:10 PM Discharge Summary signed by Homer Carlson MD    BNP >4500 Lab Results 3/2    EF/Echo Previous echo with EF of 25% with diastolic dysfunction with moderate TR/RI   Results for orders placed during the hospital encounter of 06/28/21    Radiology findings Enlarged cardiac mediastinal silhouette diffuse interstitial and groundglass lung opacities likely reflects CHF with pulmonary edema Chest Xray 3/2    Subjective/Objective Respiratory Conditions SOB H&P by Chery Salazar NP  3/2    Diuretics/Meds She was given a dose of IV Lasix in the emergency room placed on a Cardizem drip   H&P by Chery Salazar NP  3/2         Provider, please specify the diagnosis associated with the above clinical findings.    [   ]  Acute Systolic Heart Failure (HFrEF or HFmrEF)    [   ]  Acute on Chronic Systolic Heart Failure (HFrEF or HFmrEF)    [   ]  Chronic Systolic Heart Failure (HFrEF or HFmrEF)    [   ]  Acute Diastolic Heart Failure (HFpEF)    [   ]  Acute on Chronic Diastolic Heart Failure (HFpEF)   [   ]  Chronic Diastolic Heart Failure (HFpEF)   [   ]  Acute Combined Systolic and Diastolic Heart Failure    [  x ]  Acute on Chronic Combined Systolic and Diastolic Heart Failure   [   ]  Chronic Combined Systolic and  Diastolic Heart Failure   [   ]  End Stage Heart Failure (Stage D Heart Failure)   [   ]  Other (please specify): ___________________________________         Please document in your progress notes daily for the duration of treatment until resolved and include in your discharge summary.    References:  American Heart Association editorial staff. (2017, May). Ejection Fraction Heart Failure Measurement. American Heart Association. https://www.heart.org/en/health-topics/heart-failure/diagnosing-heart-failure/ejection-fraction-heart-failure-measurement#:~:text=Ejection%20fraction%20(EF)%20is%20a,pushed%20out%20with%20each%20heartbeat  FILI Santos (2020, December 15). Heart failure with preserved ejection fraction: Clinical manifestations and diagnosis. [employer]. https://www.[employer].com/contents/heart-failure-with-preserved-ejection-fraction-clinical-manifestations-and-diagnosis.  ICD-10-CM/PCS Coding Clinic Third Quarter ICD-10, Effective with discharges: September 8, 2020 [facility] § Heart failure with mid-range or mildly reduced ejection fraction (2020).  Form No. 85582

## 2022-03-14 NOTE — TELEPHONE ENCOUNTER
----- Message from Monique Mike sent at 3/11/2022 11:10 AM CST -----  Patient sister(Elvi)called and stated she was told to call the nurse Akua back to reschedule the patient appointment please give her a call at [phone]

## 2022-03-15 NOTE — TELEPHONE ENCOUNTER
----- Message from Mary Pond sent at 3/15/2022 10:36 AM CDT -----  Pt sister is cancelling her hfu appt for Thursday   [phone] michell garcia

## 2022-03-18 NOTE — PATIENT INSTRUCTIONS
If you are due for any health screening(s) below please notify me so we can arrange them to be ordered and scheduled to maintain your health.     Health Maintenance   Topic Date Due    TETANUS VACCINE  Never done    DEXA Scan  Never done    LDCT Lung Screen  10/24/2020    Hemoglobin A1c  06/12/2021    Lipid Panel  10/13/2026    Hepatitis C Screening  Completed

## 2022-03-18 NOTE — PROGRESS NOTES
Subjective:       Patient ID: Griselda Neely is a 76 y.o. female.    Chief Complaint: No chief complaint on file.    HPI    Admission Date: 3/2/2022  Hospital Length of Stay: 2 days  Discharge Date and Time:  03/04/2022     Griselda Neely is a 76 y.o. old female who who presents today with daughter for hospital visit for Afib with RVR. She is new to me. Has a past medical history of Anemia, Atrial fibrillation, Calciphylaxis (07/2017), CHF (congestive heart failure), Encounter for blood transfusion (03/2016), Gout, Hemodialysis access, AV graft, Hypertension, Mitral valve regurgitation, Osteoarthritis, Pancreatitis, Peripheral vascular disease, Pneumonia (09/09/2017), and Renal failure. Patient is a DNR      Patient presented to the ER for shortness of breath vomiting and diarrhea for the past 2 weeks. Emergency room she was found to be in atrial fibrillation with a rapid ventricular response.  She also had evidence of pulmonary edema.  She was given a dose of IV Lasix in the emergency room placed on a Cardizem drip stopped due to low BP, started on amiodarone. INR was subtherapeutic; adjustments made to warfarin-RVR resloved.    9/21  :CHF; mitral valve replacement coumadin clinic    CKD: Does not urinate- HD on M-W-T  Right forarme fistula  chronic hypoxic respiratory failure on 4 L of O2 at home  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    both legs    CHF (congestive heart failure)     Encounter for blood transfusion 03/2016    Gout     Hemodialysis access, AV graft     Hypertension     Mitral valve regurgitation     Osteoarthritis     Pancreatitis     Peripheral vascular disease     Pneumonia 09/09/2017    Renal failure        Review of patient's allergies indicates:  No Known Allergies      Current Outpatient Medications:     albuterol (ACCUNEB) 1.25 mg/3 mL Nebu, Take 1.25 mg by nebulization every 6 (six) hours as needed.  Rescue HCS, Disp: , Rfl:     albuterol (PROVENTIL/VENTOLIN HFA) 90 mcg/actuation inhaler, Inhale 1 puff into the lungs every 4 (four) hours as needed for Wheezing. rescue, Disp: , Rfl:     aspirin (ECOTRIN) 81 MG EC tablet, Take 81 mg by mouth once daily., Disp: , Rfl:     calcitRIOL (ROCALTROL) 0.5 MCG Cap, Take 1 capsule (0.5 mcg total) by mouth every Mon, Wed, Fri., Disp: 12 capsule, Rfl: 2    calcium acetate (PHOSLO) 667 mg capsule, Take 667 mg by mouth once daily. , Disp: , Rfl:     diltiaZEM (CARDIZEM) 30 MG tablet, Take 1 tablet (30 mg total) by mouth 4 (four) times daily before meals and nightly., Disp: 120 tablet, Rfl: 11    isosorbide mononitrate (IMDUR) 30 MG 24 hr tablet, Take 1 tablet (30 mg total) by mouth once daily., Disp: 90 tablet, Rfl: 3    losartan (COZAAR) 25 MG tablet, Take 1 tablet (25 mg total) by mouth every evening., Disp: 90 tablet, Rfl: 1    metoprolol succinate (TOPROL-XL) 25 MG 24 hr tablet, Take 1 tablet by mouth nightly, Disp: 90 tablet, Rfl: 0    ondansetron (ZOFRAN) 4 MG tablet, Take 1 tablet (4 mg total) by mouth every 6 (six) hours as needed for Nausea., Disp: 12 tablet, Rfl: 0    oxyCODONE-acetaminophen (PERCOCET) 5-325 mg per tablet, Take 1 tablet by mouth every 4 (four) hours as needed for Pain., Disp: 10 tablet, Rfl: 0    pantoprazole (PROTONIX) 40 MG tablet, Take 1 tablet by mouth once daily, Disp: 90 tablet, Rfl: 0    warfarin (COUMADIN) 5 MG tablet, Take 1 tablet (5 mg total) by mouth Daily., Disp: 30 tablet, Rfl: 0    LORazepam (ATIVAN) 0.5 MG tablet, Take 1 tablet (0.5 mg total) by mouth every 6 (six) hours as needed for Anxiety., Disp: 30 tablet, Rfl: 0    Review of Systems   Constitutional: Negative for unexpected weight change.   HENT: Negative for trouble swallowing.    Eyes: Negative for visual disturbance.   Respiratory: Negative for shortness of breath.    Cardiovascular: Negative for chest pain, palpitations and leg swelling.   Gastrointestinal:  "Negative for blood in stool.   Genitourinary: Negative for hematuria.   Skin: Negative for rash.   Allergic/Immunologic: Negative for immunocompromised state.   Neurological: Negative for headaches.   Hematological: Does not bruise/bleed easily.   Psychiatric/Behavioral: Negative for agitation. The patient is not nervous/anxious.        Objective:      BP (!) 110/58 (BP Location: Right arm, Patient Position: Sitting, BP Method: Small (Manual))   Pulse 108   Temp 98.2 °F (36.8 °C)   Ht 5' 5" (1.651 m)   Wt 56.7 kg (125 lb) Comment: per pt.  LMP  (LMP Unknown)   SpO2 96%   BMI 20.80 kg/m²   Physical Exam  Constitutional:       Appearance: She is well-developed.   Eyes:      Pupils: Pupils are equal, round, and reactive to light.   Cardiovascular:      Rate and Rhythm: Normal rate and regular rhythm.      Heart sounds: Normal heart sounds.   Pulmonary:      Effort: Pulmonary effort is normal.      Breath sounds: Normal breath sounds.      Comments: Continuous O2  Abdominal:      General: Bowel sounds are normal.      Palpations: Abdomen is soft.   Musculoskeletal:         General: Normal range of motion.      Cervical back: Normal range of motion.      Comments: In wheelchair   Skin:     General: Skin is warm and dry.   Neurological:      Mental Status: She is alert and oriented to person, place, and time.   Psychiatric:         Behavior: Behavior normal.         Thought Content: Thought content normal.         Judgment: Judgment normal.         Assessment:       1. Longstanding persistent atrial fibrillation    2. Warfarin anticoagulation    3. Chronic obstructive pulmonary disease, unspecified COPD type    4. Arthralgia of left temporomandibular joint    5. Gastroesophageal reflux disease without esophagitis    6. Physical debility    7. Anxiety about health    8. Essential hypertension    9. ESRD (end stage renal disease) on dialysis    10. BMI 20.0-20.9, adult        Plan:       Longstanding persistent " atrial fibrillation  Stable, continue medication  Continue cardiology follow up, Next appointment 4/11/22  Warfarin anticoagulation  Stable, continue coumadin clinic follow up  Chronic obstructive pulmonary disease, unspecified COPD type  Stable, continue  Management  Patient is requesting small portable oxygen concentrators- I will have nurse research.  Arthralgia of left temporomandibular joint  Stable, take OTC tylenol  Gastroesophageal reflux disease without esophagitis  Stable, continue management  Physical debility  In wheelchair  Anxiety about health  -     LORazepam (ATIVAN) 0.5 MG tablet; Take 1 tablet (0.5 mg total) by mouth every 6 (six) hours as needed for Anxiety.  Dispense: 30 tablet; Refill: 0    Essential hypertension  -     CBC W/ AUTO DIFFERENTIAL; Future; Expected date: 03/18/2022  -     Comprehensive Metabolic Panel; Future; Expected date: 03/18/2022  -     Hemoglobin A1C; Future; Expected date: 03/18/2022  Stable, continue management  Low sodium diet  ESRD (end stage renal disease) on dialysis  Stable, continue HD and nephrology follow up  BMI 20.0-20.9, adult  Stable, continue managmetn        Patient readiness: acceptance and barriers:none    During the course of the visit the patient was educated and counseled about the following:     Hypertension:   Dietary sodium restriction.  Regular aerobic exercise.    Goals: Hypertension: Reduce Blood Pressure    Did patient meet goals/outcomes: Yes    The following self management tools provided: declined    Patient Instructions (the written plan) was given to the patient/family.     Time spent with patient: 15 minutes    Barriers to medications present (no )    Adverse reactions to current medications (no)    Over the counter medications reviewed (Yes)

## 2022-03-23 NOTE — PROGRESS NOTES
Patient, Griselda Neely (MRN #[id number]), presented with a recent Platelet count less than 150 K/uL consistent with the definition of thrombocytopenia (ICD10 - D69.6).    Platelets   Date Value Ref Range Status   03/04/2022 125 (L) 150 - 450 K/uL Final     The patient's thrombocytopenia was monitored, evaluated, addressed and/or treated. This addendum to the medical record is made on 03/23/2022.

## 2022-03-31 NOTE — TELEPHONE ENCOUNTER
Griselda is currently at home with her caregiver and sister.     Dicussed with caregiver,   Patient is feeling is weak, fatigued and running to the bathroom with ongoing diarrhea(x2 in 30 minutes) just now, also vomiting, cramping on and off and symptoms started yesterday morning after dialysis.     She did take imodium last night and this morning with no change.   And no new medication or change in diet,     Her blood pressure 109/79   Pulse 88      Please advise, Griselda says that this happens from time to time to her and would like something called in. Patient declines going to the ER at this time.

## 2022-03-31 NOTE — TELEPHONE ENCOUNTER
----- Message from Luisa Bernal sent at 3/31/2022 10:01 AM CDT -----  Regarding: pt called  Name of Who is Calling: SRIKANTH LUONG [[id number]] Rachel ( caregiver)      What is the request in detail: patient has  abd pain, fatigue, vomiting , diarrhea on yesterday and would like to be seen. Please advise       Can the clinic reply by [facility]: NO      What Number to Call Back if not in [facility]: [phone]

## 2022-03-31 NOTE — TELEPHONE ENCOUNTER
Reached out to the caregiver again, Griselda's son is on his way to the house and their going to head over to the ER.

## 2022-03-31 NOTE — TELEPHONE ENCOUNTER
Patient needs to be seen in ER or in Clinic to determine what's causing s/s and treatment. I would prefer patient be seen in ER for faster evaluation and treatment.

## 2022-04-01 NOTE — ED PROVIDER NOTES
Encounter Date: 3/31/2022       History     Chief Complaint   Patient presents with    Chest Pain     Chest pain x 1 day with sob, abdominal pain and diarrhea.      HPI     76-year-old female with past medical history of atrial fibrillation, CHF, COPD, ESRD on dialysis, pancreatitis presents with shortness of breath, abdominal pain, nausea vomiting and diarrhea for 2 days.  Patient states she went to dialysis yesterday and received full treatment.  However she did have diarrhea while she was there.  Her diarrhea is watery and yellow.  No gross blood.  She has diffuse abdominal pain that is worse in the epigastric area.    In triage patient stated she had chest pain, currently she states only her abdomen hurts.    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    both legs    CHF (congestive heart failure)     Encounter for blood transfusion 03/2016    Gout     Hemodialysis access, AV graft     Hypertension     Mitral valve regurgitation     Osteoarthritis     Pancreatitis     Peripheral vascular disease     Pneumonia 09/09/2017    Renal failure      Past Surgical History:   Procedure Laterality Date    ACHILLES TENDON SURGERY Right     ANGIOGRAPHY OF ARTERIOVENOUS SHUNT Right 1/7/2020    Procedure: Fistulogram with Possible Intervention;  Surgeon: Joseph Loyola MD;  Location: [facility] CATH/EP LAB;  Service: Cardiology;  Laterality: Right;    ANGIOGRAPHY OF LOWER EXTREMITY Left 3/18/2020    Procedure: Angiogram Extremity Unilateral;  Surgeon: Ali Khoobehi, MD;  Location: [facility] CATH/EP LAB;  Service: Cardiology;  Laterality: Left;    APPENDECTOMY      CARDIAC CATHETERIZATION  07/03/2017    CHOLECYSTECTOMY      COLONOSCOPY Left 10/4/2020    Procedure: COLONOSCOPY;  Surgeon: Jordan Kilpatrick MD;  Location: [facility] ENDO;  Service: Endoscopy;  Laterality: Left;     ESOPHAGOGASTRODUODENOSCOPY Left 8/17/2020    Procedure: EGD (ESOPHAGOGASTRODUODENOSCOPY);  Surgeon: Talat Trevizo MD;  Location: [facility] ENDO;  Service: Endoscopy;  Laterality: Left;    ESOPHAGOGASTRODUODENOSCOPY Left 10/2/2020    Procedure: EGD (ESOPHAGOGASTRODUODENOSCOPY);  Surgeon: Talat Trevizo MD;  Location: [facility] ENDO;  Service: Endoscopy;  Laterality: Left;    ESOPHAGOGASTRODUODENOSCOPY N/A 6/29/2021    Procedure: EGD (ESOPHAGOGASTRODUODENOSCOPY);  Surgeon: Sudheer Brown III, MD;  Location: [facility] ENDO;  Service: Endoscopy;  Laterality: N/A;    heal surgery Right     HYSTERECTOMY      INSERTION OF STENT INTO PERIPHERAL VESSEL N/A 1/7/2020    Procedure: INSERTION, STENT, VESSEL, PERIPHERAL;  Surgeon: Joseph Loyola MD;  Location: [facility] CATH/EP LAB;  Service: Cardiology;  Laterality: N/A;    MITRAL VALVE REPLACEMENT      PARATHYROIDECTOMY      PARATHYROIDECTOMY  07/13/2017    PERCUTANEOUS TRANSLUMINAL ANGIOPLASTY OF ARTERIOVENOUS FISTULA N/A 1/7/2020    Procedure: PTA, AV FISTULA;  Surgeon: Joseph Loyola MD;  Location: [facility] CATH/EP LAB;  Service: Cardiology;  Laterality: N/A;    PERITONEAL CATHETER INSERTION      RENAL BIOPSY      SMALL BOWEL ENTEROSCOPY N/A 12/2/2020    Procedure: ENTEROSCOPY;  Surgeon: Sudheer Brown III, MD;  Location: [facility] ENDO;  Service: Endoscopy;  Laterality: N/A;    vocal cord nodule      WOUND DEBRIDEMENT Left 7/13/2020    Procedure: DEBRIDEMENT, WOUND;  Surgeon: Carlos Elam III, MD;  Location: [facility] OR;  Service: General;  Laterality: Left;     Family History   Problem Relation Age of Onset    Heart disease Sister     Early death Sister         heart as baby    Heart disease Maternal Grandfather     Diabetes Mother     Hypertension Mother     Heart failure Mother     Heart disease Mother     Arthritis Mother     Diabetes Father     Early death Sister         infant     Social History     Tobacco Use    Smoking status: Current Some Day Smoker      Packs/day: 0.50     Years: 45.00     Pack years: 22.50     Types: Cigarettes    Smokeless tobacco: Never Used   Substance Use Topics    Alcohol use: No    Drug use: No     Review of Systems   Constitutional: Negative for chills and fever.   HENT: Negative for congestion and sore throat.    Eyes: Negative for pain and redness.   Respiratory: Positive for shortness of breath.    Cardiovascular: Positive for chest pain.   Gastrointestinal: Positive for diarrhea, nausea and vomiting.   Musculoskeletal: Negative for back pain and neck pain.   Skin: Negative for rash.   Neurological: Negative for weakness.   Hematological: Does not bruise/bleed easily.   Psychiatric/Behavioral: Negative for agitation and confusion.       Physical Exam     Initial Vitals [03/31/22 2214]   BP Pulse Resp Temp SpO2   96/65 (!) 117 20 98.3 °F (36.8 °C) 100 %      MAP       --         Physical Exam    Nursing note and vitals reviewed.  Constitutional: She appears well-developed and well-nourished.   HENT:   Oropharynx dry   Eyes: Pupils are equal, round, and reactive to light. No scleral icterus.   Neck: Neck supple.   Normal range of motion.  Cardiovascular: Normal rate and regular rhythm.   Pulmonary/Chest:   Patient is on home oxygen, 3-4 L. Speaking in full sentences.  No respiratory distress.   Abdominal: Abdomen is soft. She exhibits distension. There is abdominal tenderness (Epigastric area). There is no rebound and no guarding.   Musculoskeletal:         General: No edema. Normal range of motion.      Cervical back: Normal range of motion and neck supple.     Neurological: She is alert and oriented to person, place, and time. She has normal strength.   Skin: Skin is warm and dry.   Psychiatric: She has a normal mood and affect. Thought content normal.         ED Course   Procedures  Labs Reviewed   CBC W/ AUTO DIFFERENTIAL - Abnormal; Notable for the following components:       Result Value    RBC 3.89 (*)     Hemoglobin 11.5 (*)      Hematocrit 36.8 (*)     MCHC 31.3 (*)     RDW 18.8 (*)     Immature Granulocytes 0.9 (*)     Immature Grans (Abs) 0.05 (*)     Lymph # 0.7 (*)     Lymph % 13.8 (*)     All other components within normal limits   COMPREHENSIVE METABOLIC PANEL - Abnormal; Notable for the following components:    Sodium 135 (*)     Chloride 93 (*)     BUN 49 (*)     Creatinine 7.3 (*)     Calcium 8.0 (*)     Total Bilirubin 1.5 (*)     ALT 8 (*)     Anion Gap 17 (*)     eGFR if  5.8 (*)     eGFR if non  5.0 (*)     All other components within normal limits   B-TYPE NATRIURETIC PEPTIDE - Abnormal; Notable for the following components:    BNP >4,500 (*)     All other components within normal limits   TROPONIN I - Abnormal; Notable for the following components:    Troponin I 0.043 (*)     All other components within normal limits   PROTIME-INR - Abnormal; Notable for the following components:    PT 17.7 (*)     All other components within normal limits   OCCULT BLOOD X 1, STOOL - Abnormal; Notable for the following components:    Occult Blood Positive (*)     All other components within normal limits   CLOSTRIDIUM DIFFICILE   CULTURE, BLOOD   CULTURE, BLOOD   CULTURE, STOOL   LACTIC ACID, PLASMA   LIPASE   SARS-COV-2 RNA AMPLIFICATION, QUAL   STOOL EXAM-OVA,CYSTS,PARASITES   OCCULT BLOOD X 1, STOOL     EKG Readings: (Independently Interpreted)   Atrial fibrillation with RVR at 117.  No ST elevations present.       Imaging Results          CTA Chest Abdomen Pelvis (Final result)  Result time 04/01/22 00:55:09    Final result by Guido Sanchez MD (04/01/22 00:55:09)                 Narrative:    CLINICAL INDICATION:  concern for mesenteric ischemia    TECHNIQUE: Axial CT imaging of the chest, abdomen and pelvis was performed. Sagittal and coronal reconstructions were performed for review. Performed with angiography protocol. 2-D and 3-D reconstructions    CONTRAST: 70 mL Visipaque 320.    COMPARISON: CT  abdomen/pelvis January 24, 2022.    FINDINGS:  CT chest angiography: There is persistent loculated right pleural effusion similar to prior exam. There is a small layering left-sided pleural fluid collection.    The heart is enlarged. There is no significant pericardial effusion. Previous mitral valve repair noted. The aorta is atherosclerotic but normal size without dissection. The proximal great vessels are disease. The brachiocephalic and left carotid demonstrate no significant stenosis at their origin. The left subclavian and likely has moderate stenosis at its origin.    The pulmonary arteries are enlarged consistent with pulmonary arterial hypertension but no pulmonary emboli are demonstrated.    Infiltrate is present in the right lung which could represent pneumonia. Basilar lung atelectatic changes are noted.    There is a sclerotic lesion in the posterior left third rib. No other bone lesions are evident. Mild loss of height of T7 and T4.      CT abdomen/pelvis angiography: The liver is small and may be cirrhotic. The spleen is not enlarged. Both kidneys are atrophic. Ascites is present within the abdomen.    The uterus is absent. The bladder is nearly contracted. Adrenal gland hyperplasia is present. The pancreas is grossly normal in appearance.    No bowel distention. Extensive diverticulosis of the distal colon. Sigmoid diverticulitis may be present. The sigmoid colon appears thicker than previous exam.    The abdominal aorta is normal size without stenosis or dissection. Severe bilateral renal arterial disease is present with high-grade stenoses. The iliac arteries are densely calcified and also demonstrated areas of at least mild to moderate stenosis. The left internal iliac artery is occluded. Moderate to high-grade stenosis of the proximal celiac artery. High-grade stenosis of the proximal superior mesenteric artery. The inferior mesenteric artery is patent.      IMPRESSION:  1: Marked  cardiomegaly.  2: Right greater than left pleural effusions. Right pleural effusion remains loculated.  3: Right lung infiltrate which could represent pneumonia.  4: Possible cirrhosis of the liver.  5: Severely atrophic kidneys.  6: Increased abdominal ascites over January 2022.  7: Diverticulosis of the sigmoid colon with thickening suggesting sigmoid diverticulitis without complication.  8: Extensive arterial vascular disease of the aorta and branch vessels with high-grade bilateral renal artery stenosis, severe stenosis of the superior mesenteric artery and moderate to high-grade stenosis of the celiac artery.    RADIATION DOSE REDUCTION: All CT scans are performed using radiation dose reduction techniques, when applicable. Technical factors are evaluated and adjusted to ensure appropriate moderation of exposure.    Electronically signed by:  Guido Sanchez MD  4/1/2022 12:55 AM CDT Workstation: [id number]                             X-Ray Chest AP Portable (In process)                X-Ray KUB (In process)    Procedure changed from X-Ray Abdomen Flat And Erect                  Medications   piperacillin-tazobactam 4.5 g in dextrose 5 % 100 mL IVPB (ready to mix system) (4.5 g Intravenous New Bag 4/1/22 0127)   ondansetron injection 4 mg (4 mg Intravenous Given 3/31/22 2337)   morphine injection 2 mg (2 mg Intravenous Given 3/31/22 2338)   iodixanoL (VISIPAQUE 320) injection 100 mL (100 mLs Intravenous Given 4/1/22 0036)     Medical Decision Making:   Initial Assessment:   76-year-old female present abdominal pain, nausea, vomiting for 2 days.  Received a full dialysis treatment yesterday.  On initial exam, the patient is obviously in pain.  She is actively vomiting.  She has watery yellow stool.  The abdomen is distended but soft and compressible.  Her pain is worst in the epigastrium on palpation.  Overall her level of discomfort is out of proportion with exam.      Vitals are significant for hypertension  96/65 and tachycardia with a rate of 117. Patient is in AFib.  She is afebrile.     Will treat empirically for intra-abdominal infection with Zosyn.  Blood cultures drawn.  Blood pressure is improving spontaneously, will withhold fluid boluses secondary to patient is ESRD.    The patient has had repeated presentations is severe abdominal pain nausea vomiting.  She has many risk factors for vascular catastrophes, I will obtain   a CTA of her chest abdomen pelvis today to rule out mesenteric ischemia.    Zofran and morphine given.    Domenica Powers MD  [facility] Emergency Medicine Residency PGY-4  04/01/2022 12:22 AM       Differential Diagnosis:   Mesenteric ischemia, ACS, pancreatitis, gastritis, diverticulitis  ED Management:        UPDATE:   CTA  IMPRESSION:  1: Marked cardiomegaly.  2: Right greater than left pleural effusions. Right pleural effusion remains loculated.  3: Right lung infiltrate which could represent pneumonia.  4: Possible cirrhosis of the liver.  5: Severely atrophic kidneys.  6: Increased abdominal ascites over January 2022.  7: Diverticulosis of the sigmoid colon with thickening suggesting sigmoid diverticulitis without complication.  8: Extensive arterial vascular disease of the aorta and branch vessels with high-grade bilateral renal artery stenosis, severe stenosis of the superior mesenteric artery and moderate to high-grade stenosis of the celiac artery.      Patient feels better, no further vomiting.  She has had multiple episodes of diarrhea in the emergency department.  Stool studies sent.  BNP is undetectable a high.  Troponin is weakly positive at 0.043.  She does have occult blood positive stool.  She has high-grade stenosis of her mesenteric arteries but no occlusions.    Will admit to hospitalist for diverticulitis.     Domenica Powers MD  [facility] Emergency Medicine Residency PGY-4  04/01/2022 1:26 AM                           Clinical Impression:   Final diagnoses:  [R07.9] Chest pain  [R10.9]  Abdominal pain  [K57.92] Diverticulitis (Primary)  [R19.7] Diarrhea, unspecified type          ED Disposition Condition    Admit               Domenica Powers MD  04/01/22 0127

## 2022-04-01 NOTE — PLAN OF CARE
Problem: Adult Inpatient Plan of Care  Goal: Plan of Care Review  Outcome: Ongoing, Progressing  Goal: Readiness for Transition of Care  Outcome: Ongoing, Progressing     Problem: Fluid Imbalance (Pneumonia)  Goal: Fluid Balance  Outcome: Ongoing, Progressing     Problem: Infection  Goal: Absence of Infection Signs and Symptoms  Outcome: Ongoing, Progressing     Problem: Skin Injury Risk Increased  Goal: Skin Health and Integrity  Outcome: Ongoing, Progressing     Problem: Hemodynamic Instability (Hemodialysis)  Goal: Effective Tissue Perfusion  Outcome: Ongoing, Progressing     Problem: Infection (Hemodialysis)  Goal: Absence of Infection Signs and Symptoms  Outcome: Ongoing, Progressing

## 2022-04-01 NOTE — H&P
[facility] History & Physical Examination   Patient Name: Griselda Neely  MRN: [id number]  Patient Class: OP- Observation   Admission Date: 3/31/2022 10:11 PM  Length of Stay: 0  Attending Physician: Hola Rubio MD  Primary Care Provider: Denver Cerna NP  Face-to-Face encounter date: 04/01/2022  Code Status: DNR  Chief Complaint: Chest Pain (Chest pain x 1 day with sob, abdominal pain and diarrhea. )          Patient information was obtained from patient, past medical records and ER records.   HISTORY OF PRESENT ILLNESS:   History was obtained from the patient and collateral obtained from the family present at the bedside and ER physician Sign-out.   Patient is a 76-year-old female with past medical history of atrial fibrillation, CHF, COPD, ESRD on dialysis, pancreatitis presents with shortness of breath, abdominal pain, nausea vomiting and diarrhea for 2 days.  Patient states she went to dialysis yesterday and received full treatment.  However she did have diarrhea while she was there.  Her diarrhea is watery and yellow.  No gross blood.  She has diffuse abdominal pain that is worse in the epigastric area.    In triage patient stated she had chest pain, currently she states only her abdomen hurts.      Patient denies fever, chills, hematemesis, dysuria, melena, numbness, tingling or LOC. She denies any recent antibiotics.    In the ED she is afebrile. BP is 96/65. O2 sat wnl on home rate of 4L. CBC with normal WBC and consistent with chronic anemia.    CMP -shows sodium 135, bun 49, creat 7.3, tbili 1.5  BNP is >4500 and troponin is elevated 0.043. patient denies chest pain  EKG shows Afib with RVR at 120bpm         REVIEW OF SYSTEMS:   10 Point Review of System was performed and was found to be negative except for that mentioned already in the HPI above.     PAST MEDICAL HISTORY:     Past Medical History:   Diagnosis Date    Anemia     Atrial fibrillation      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    both legs    CHF (congestive heart failure)     Encounter for blood transfusion 03/2016    Gout     Hemodialysis access, AV graft     Hypertension     Mitral valve regurgitation     Osteoarthritis     Pancreatitis     Peripheral vascular disease     Pneumonia 09/09/2017    Renal failure        PAST SURGICAL HISTORY:     Past Surgical History:   Procedure Laterality Date    ACHILLES TENDON SURGERY Right     ANGIOGRAPHY OF ARTERIOVENOUS SHUNT Right 1/7/2020    Procedure: Fistulogram with Possible Intervention;  Surgeon: Joseph Loyola MD;  Location: [facility] CATH/EP LAB;  Service: Cardiology;  Laterality: Right;    ANGIOGRAPHY OF LOWER EXTREMITY Left 3/18/2020    Procedure: Angiogram Extremity Unilateral;  Surgeon: Ali Khoobehi, MD;  Location: [facility] CATH/EP LAB;  Service: Cardiology;  Laterality: Left;    APPENDECTOMY      CARDIAC CATHETERIZATION  07/03/2017    CHOLECYSTECTOMY      COLONOSCOPY Left 10/4/2020    Procedure: COLONOSCOPY;  Surgeon: Jordan Kilpatrick MD;  Location: [facility] ENDO;  Service: Endoscopy;  Laterality: Left;    ESOPHAGOGASTRODUODENOSCOPY Left 8/17/2020    Procedure: EGD (ESOPHAGOGASTRODUODENOSCOPY);  Surgeon: Talat Trevizo MD;  Location: [facility];  Service: Endoscopy;  Laterality: Left;    ESOPHAGOGASTRODUODENOSCOPY Left 10/2/2020    Procedure: EGD (ESOPHAGOGASTRODUODENOSCOPY);  Surgeon: Talat Trevizo MD;  Location: [facility];  Service: Endoscopy;  Laterality: Left;    ESOPHAGOGASTRODUODENOSCOPY N/A 6/29/2021    Procedure: EGD (ESOPHAGOGASTRODUODENOSCOPY);  Surgeon: Sudheer Brown III, MD;  Location: [facility];  Service: Endoscopy;  Laterality: N/A;    heal surgery Right     HYSTERECTOMY      INSERTION OF STENT INTO PERIPHERAL VESSEL N/A 1/7/2020    Procedure: INSERTION, STENT, VESSEL, PERIPHERAL;  Surgeon: Joseph Loyola MD;  Location: [facility] CATH/EP LAB;  Service: Cardiology;  Laterality: N/A;    MITRAL VALVE REPLACEMENT       PARATHYROIDECTOMY      PARATHYROIDECTOMY  07/13/2017    PERCUTANEOUS TRANSLUMINAL ANGIOPLASTY OF ARTERIOVENOUS FISTULA N/A 1/7/2020    Procedure: PTA, AV FISTULA;  Surgeon: Joseph Loyola MD;  Location: [facility] CATH/EP LAB;  Service: Cardiology;  Laterality: N/A;    PERITONEAL CATHETER INSERTION      RENAL BIOPSY      SMALL BOWEL ENTEROSCOPY N/A 12/2/2020    Procedure: ENTEROSCOPY;  Surgeon: Sudheer Brown III, MD;  Location: [facility] ENDO;  Service: Endoscopy;  Laterality: N/A;    vocal cord nodule      WOUND DEBRIDEMENT Left 7/13/2020    Procedure: DEBRIDEMENT, WOUND;  Surgeon: Carlos Elam III, MD;  Location: [facility] OR;  Service: General;  Laterality: Left;       ALLERGIES:   Patient has no known allergies.    FAMILY HISTORY:     Family History   Problem Relation Age of Onset    Heart disease Sister     Early death Sister         heart as baby    Heart disease Maternal Grandfather     Diabetes Mother     Hypertension Mother     Heart failure Mother     Heart disease Mother     Arthritis Mother     Diabetes Father     Early death Sister         infant       SOCIAL HISTORY:     Social History     Tobacco Use    Smoking status: Current Some Day Smoker     Packs/day: 0.50     Years: 45.00     Pack years: 22.50     Types: Cigarettes    Smokeless tobacco: Never Used   Substance Use Topics    Alcohol use: No        Social History     Substance and Sexual Activity   Sexual Activity Not Currently        HOME MEDICATIONS:     Prior to Admission medications    Medication Sig Start Date End Date Taking? Authorizing Provider   albuterol (ACCUNEB) 1.25 mg/3 mL Nebu Take 1.25 mg by nebulization every 6 (six) hours as needed. Rescue  HCS    Historical Provider   albuterol (PROVENTIL/VENTOLIN HFA) 90 mcg/actuation inhaler Inhale 1 puff into the lungs every 4 (four) hours as needed for Wheezing. rescue 12/11/20   Historical Provider   aspirin (ECOTRIN) 81 MG EC tablet Take 81 mg by mouth once daily.     Historical Provider   calcitRIOL (ROCALTROL) 0.5 MCG Cap Take 1 capsule (0.5 mcg total) by mouth every Mon, Wed, Fri. 3/4/22 6/2/22  Homer Carlson MD   calcium acetate (PHOSLO) 667 mg capsule Take 667 mg by mouth once daily.     Historical Provider   diltiaZEM (CARDIZEM) 30 MG tablet Take 1 tablet (30 mg total) by mouth 4 (four) times daily before meals and nightly. 10/14/21 10/14/22  Gabe Davis MD   isosorbide mononitrate (IMDUR) 30 MG 24 hr tablet Take 1 tablet (30 mg total) by mouth once daily. 1/25/22   Tosha Franklin PA-C   LORazepam (ATIVAN) 0.5 MG tablet Take 1 tablet (0.5 mg total) by mouth every 6 (six) hours as needed for Anxiety. 3/18/22 4/17/22  Denver eCrna NP   losartan (COZAAR) 25 MG tablet Take 1 tablet (25 mg total) by mouth every evening. 1/25/22   Tosha Franklin PA-C   metoprolol succinate (TOPROL-XL) 25 MG 24 hr tablet Take 1 tablet by mouth nightly 1/25/22   Mary Do NP   ondansetron (ZOFRAN) 4 MG tablet Take 1 tablet (4 mg total) by mouth every 6 (six) hours as needed for Nausea. 1/23/22   Erna Cartagena MD   oxyCODONE-acetaminophen (PERCOCET) 5-325 mg per tablet Take 1 tablet by mouth every 4 (four) hours as needed for Pain. 1/23/22   Erna Cartagena MD   pantoprazole (PROTONIX) 40 MG tablet Take 1 tablet by mouth once daily 1/25/22   Mary Do NP   warfarin (COUMADIN) 5 MG tablet Take 1 tablet (5 mg total) by mouth Daily. 3/4/22 3/4/23  Homer Carlson MD   hydrALAZINE (APRESOLINE) 50 MG tablet Take 1 tablet (50 mg total) by mouth every 8 (eight) hours. 12/31/20 3/4/22  Kalie Neely MD         PHYSICAL EXAM:   BP 96/65 (BP Location: Left arm, Patient Position: Sitting)   Pulse (!) 117   Temp 98.3 °F (36.8 °C) (Oral)   Resp 18   Wt 55.8 kg (123 lb)   LMP  (LMP Unknown)   SpO2 100%   BMI 20.47 kg/m²   Vitals Reviewed  General appearance: Well-developed, thin, chronically ill  female.  Skin: dry, flaky  Neuro:  AAOx3. Follows commands. Motor and sensory exams grossly intact. Good tone. Generalized weakness   HENT: Atraumatic head. Moist mucous membranes of oral cavity.  Eyes: Normal extraocular movements. PERRL  Neck: Supple. No evidence of lymphadenopathy. No thyroidomegaly.  Lungs: Lung sounds diminished to bases bilaterally. No wheezing present.   Heart: Regular rate and rhythm. S1 and S2 present with no murmurs/gallop/rub. No pedal edema. No JVD present.   Abdomen: Soft, distended, Tenderness to epigastric area. No rebound tenderness/guarding. No masses or organomegaly. Bowel sounds are normal. Bladder is not palpable.   Extremities: capillary refill less than 2 seconds.   Psych/mental status: Alert and oriented. Cooperative. Responds appropriately to questions.   EMERGENCY DEPARTMENT LABS AND IMAGING:     Labs Reviewed   CLOSTRIDIUM DIFFICILE - Abnormal; Notable for the following components:       Result Value    C. diff Antigen Positive (*)     All other components within normal limits   CBC W/ AUTO DIFFERENTIAL - Abnormal; Notable for the following components:    RBC 3.89 (*)     Hemoglobin 11.5 (*)     Hematocrit 36.8 (*)     MCHC 31.3 (*)     RDW 18.8 (*)     Immature Granulocytes 0.9 (*)     Immature Grans (Abs) 0.05 (*)     Lymph # 0.7 (*)     Lymph % 13.8 (*)     All other components within normal limits   COMPREHENSIVE METABOLIC PANEL - Abnormal; Notable for the following components:    Sodium 135 (*)     Chloride 93 (*)     BUN 49 (*)     Creatinine 7.3 (*)     Calcium 8.0 (*)     Total Bilirubin 1.5 (*)     ALT 8 (*)     Anion Gap 17 (*)     eGFR if  5.8 (*)     eGFR if non  5.0 (*)     All other components within normal limits   B-TYPE NATRIURETIC PEPTIDE - Abnormal; Notable for the following components:    BNP >4,500 (*)     All other components within normal limits   TROPONIN I - Abnormal; Notable for the following components:    Troponin I 0.043 (*)     All other  components within normal limits   PROTIME-INR - Abnormal; Notable for the following components:    PT 17.7 (*)     All other components within normal limits   OCCULT BLOOD X 1, STOOL - Abnormal; Notable for the following components:    Occult Blood Positive (*)     All other components within normal limits   CULTURE, BLOOD   CULTURE, BLOOD   CULTURE, STOOL   C DIFF TOXIN BY PCR   LACTIC ACID, PLASMA   LIPASE   SARS-COV-2 RNA AMPLIFICATION, QUAL   STOOL EXAM-OVA,CYSTS,PARASITES   OCCULT BLOOD X 1, STOOL       CTA Chest Abdomen Pelvis   Final Result      X-Ray Chest AP Portable    (Results Pending)   X-Ray KUB    (Results Pending)       ASSESSMENT & PLAN:   Griselda Neely is a 76 y.o. female admitted for    Active problems/reason for admission  C. Diff colitis  GI bleed  Afib with RVR      Chronic problems  HTN  COPD with home oxygen  CHF  ESRD on HD   Afib  MVR  PVD  Anemia        Plan  Admit to telemetry  Prn analgesics  Oral vancomycin  Consult ID  Patient tolerating PO at this time; no IVF as she is ESRD and requires 4LNC; bilateral pleural effusions on CT scan  Restart home diltiazem and metoprolol  Hold anticoagulants  Follow h/h  Consult nephrology to manage HD  Restart home medications as ordered for chronic conditions      Diet: clear liquid    DVT Prophylaxis: mechanical DVT prophylaxis. Encourage ambulation and OOB as tolerated.     Discharge Planning and Disposition:  Patient will be discharged in 2-3 days  ________________________________________________________________________________    Face-to-Face encounter date: 04/01/2022  Encounter included review of the medical records, interviewing and examining the patient face-to-face, discussion with family and other health care providers including emergency medicine physician, admission orders, interpreting lab/test results and formulating a plan of care.   Medical Decision Making during this encounter was  [_] Low Complexity  [_] Moderate Complexity  [x]  High Complexity  _________________________________________________________________________________    INPATIENT LIST OF MEDICATIONS     Current Facility-Administered Medications:     diltiaZEM tablet 30 mg, 30 mg, Oral, ED 1 Time, Domenica Powers MD    vancomycin 125 mg/5 mL oral solution 125 mg, 125 mg, Oral, Q6H, Rebeca Blair NP    Current Outpatient Medications:     albuterol (ACCUNEB) 1.25 mg/3 mL Nebu, Take 1.25 mg by nebulization every 6 (six) hours as needed. Rescue HCS, Disp: , Rfl:     albuterol (PROVENTIL/VENTOLIN HFA) 90 mcg/actuation inhaler, Inhale 1 puff into the lungs every 4 (four) hours as needed for Wheezing. rescue, Disp: , Rfl:     aspirin (ECOTRIN) 81 MG EC tablet, Take 81 mg by mouth once daily., Disp: , Rfl:     calcitRIOL (ROCALTROL) 0.5 MCG Cap, Take 1 capsule (0.5 mcg total) by mouth every Mon, Wed, Fri., Disp: 12 capsule, Rfl: 2    calcium acetate (PHOSLO) 667 mg capsule, Take 667 mg by mouth once daily. , Disp: , Rfl:     diltiaZEM (CARDIZEM) 30 MG tablet, Take 1 tablet (30 mg total) by mouth 4 (four) times daily before meals and nightly., Disp: 120 tablet, Rfl: 11    isosorbide mononitrate (IMDUR) 30 MG 24 hr tablet, Take 1 tablet (30 mg total) by mouth once daily., Disp: 90 tablet, Rfl: 3    LORazepam (ATIVAN) 0.5 MG tablet, Take 1 tablet (0.5 mg total) by mouth every 6 (six) hours as needed for Anxiety., Disp: 30 tablet, Rfl: 0    losartan (COZAAR) 25 MG tablet, Take 1 tablet (25 mg total) by mouth every evening., Disp: 90 tablet, Rfl: 1    metoprolol succinate (TOPROL-XL) 25 MG 24 hr tablet, Take 1 tablet by mouth nightly, Disp: 90 tablet, Rfl: 0    ondansetron (ZOFRAN) 4 MG tablet, Take 1 tablet (4 mg total) by mouth every 6 (six) hours as needed for Nausea., Disp: 12 tablet, Rfl: 0    oxyCODONE-acetaminophen (PERCOCET) 5-325 mg per tablet, Take 1 tablet by mouth every 4 (four) hours as needed for Pain., Disp: 10 tablet, Rfl: 0    pantoprazole (PROTONIX) 40 MG  tablet, Take 1 tablet by mouth once daily, Disp: 90 tablet, Rfl: 0    warfarin (COUMADIN) 5 MG tablet, Take 1 tablet (5 mg total) by mouth Daily., Disp: 30 tablet, Rfl: 0      Scheduled Meds:   diltiaZEM  30 mg Oral ED 1 Time    vancomycin  125 mg Oral Q6H     Continuous Infusions:  PRN Meds:.      Rebeca Blair  [facility] Hospitalist  04/01/2022

## 2022-04-01 NOTE — PLAN OF CARE
[facility]  Initial Discharge Assessment       Primary Care Provider: Denver Cerna NP    Admission Diagnosis: Diverticulitis [K57.92]    Admission Date: 3/31/2022  Expected Discharge Date: 4/3/2022    Discharge Barriers Identified: (P) None  Assessment completed at bedside with patient and via telephone with Aide Watson, [phone], sister. Patient does not have a POA or AD. Patient lives at home with her sister and plans to return home upon discharge. Patient uses a rolling walker and wheelchair at home as well as oxygen from [facility] (confirmed, seen note below). Patient also receives Home Health from [facility] (confirmed, see note below) 1x a week. Patient wants to resume  care when discharged. Patient states she has all the DME she needs and is not requesting any further DME for discharge.     Patient also receives dialysis MWF at [facility] (confirmed, see note below).       Payor: [employer] MEDICARE / Plan: [employer] MEDICARE HMO / Product Type: Capitation /     Extended Emergency Contact Information  Primary Emergency Contact: Aide Watson  Address: [address] [place] [address] [place]  Mobile Phone: [phone]  Relation: Sister  Secondary Emergency Contact: SussyElvi  Address: UNKNOWN   [place]  Mobile Phone: [phone]  Relation: Sister    Discharge Plan A: (P) Home with family, Home Health  Discharge Plan B: (P) Home with family, Home Health      [facility] [address], [place] [address]  [address] [facility] [address]  Phone: [phone] Fax: [phone]    [facility] - [facility] [address]  [address] [facility] [address]  Phone: [phone] Fax: [phone]    [facility]  [address]  [facility] [address]  Phone: [phone] Fax: [phone]      Initial Assessment (most recent)        Adult Discharge Assessment - 04/01/22 1053          Discharge Assessment    Assessment Type Discharge Planning Assessment (P)      Confirmed/corrected address, phone number and insurance Yes (P)      Confirmed Demographics Correct on Facesheet (P)      Source of Information patient;family (P)      When was your last doctors appointment? -- (P)    two weeks ago with NP    Communicated SIENNA with patient/caregiver Date not available/Unable to determine (P)      Reason For Admission vomiting, diarrhea, generally not feeling well (P)      Lives With sibling(s) (P)      Facility Arrived From: home (P)      Do you expect to return to your current living situation? Yes (P)      Do you have help at home or someone to help you manage your care at home? Yes (P)      Who are your caregiver(s) and their phone number(s)? Aide Watson, [phone], sister (P)      Prior to hospitilization cognitive status: Alert/Oriented (P)      Current cognitive status: Alert/Oriented (P)      Walking or Climbing Stairs Difficulty ambulation difficulty, requires equipment (P)      Mobility Management rolling walker, wheelchair (P)      Dressing/Bathing Difficulty bathing difficulty, assistance 1 person;dressing difficulty, assistance 1 person (P)      Dressing/Bathing Management her sister helps or the home health nurse (P)      Home Accessibility wheelchair accessible (P)      Home Layout Able to live on 1st floor (P)      Equipment Currently Used at Home walker, rolling;wheelchair;oxygen (P)    Pt receives oxygen from [facility], confirmed with Jose that patient has concentrator and portables; [phone]    Readmission within 30 days? Yes (P)    fluid in the lungs, stayed about 3 days, current admission is not related to prior stay    Patient currently being followed by outpatient case management? No (P)      Do you currently have service(s) that help you manage your care at home? Yes (P)      How Many hours does patient receive  services -- (P)    comes 1 day a week    Name and Contact number of agency [facility] , [phone]; confirmed with Jackelin that patient is receiving current services (P)      Is the pt/caregiver preference to resume services with current agency Yes (P)      Do you take prescription medications? Yes (P)      Do you have prescription coverage? Yes (P)      Coverage [employer] (P)      Do you have any problems affording any of your prescribed medications? No (P)      Is the patient taking medications as prescribed? yes (P)      Who is going to help you get home at discharge? Aide Watson, [phone], sister; Jovany Neely, son (P)      How do you get to doctors appointments? family or friend will provide (P)      Are you on dialysis? Yes (P)      Dialysis Name and Scheduled days [facility], [phone], [facility] (P)      Do you take coumadin? Yes (P)      Who monitors your labs? [facility] (P)      Discharge Plan A Home with family;Home Health (P)      Discharge Plan B Home with family;Home Health (P)      DME Needed Upon Discharge  none (P)      Discharge Plan discussed with: Sibling;Patient (P)      Name(s) and Number(s) Aide Watson, [phone], sister (P)      Discharge Barriers Identified None (P)

## 2022-04-01 NOTE — PLAN OF CARE
Medicare Outpatient Observation Notice was signed, explained and given to patient/caregiver on 04/01/2022 at 10:09am     addressed any questions or concerns.    Medicare Outpatient Observation Notice will be scanned into patient's medical record

## 2022-04-01 NOTE — PROGRESS NOTES
HD tx complete, net 1.5L removed       04/01/22 1640   Required for all Hemodialysis Patients   Hepatitis Status negative   Handoff Report   Received From [facility]   Given To Mary GONZALEZ   Treatment Type   Treatment Type Maintenance   Vital Signs   Temp 97.4 °F (36.3 °C)   Temp src Oral   Pulse 66   Heart Rate Source Monitor   Resp 18   SpO2 97 %   O2 Device (Oxygen Therapy) nasal cannula   /66   BP Location Left arm   BP Method Automatic   Patient Position Lying   Orthostatic VS No   Assessments (Pre/Post)   Consent Obtained yes   Safety vein preservation armband present   Date Hepatitis Profile Obtained 03/14/22   Blood Liters Processed (BLP) 50   Transport Modality bed   Level of Consciousness (AVPU) alert   Dialyzer Clearance mildly streaked        Hemodialysis AV Fistula 08/08/19 0214 Right upper arm   Placement Date/Time: 08/08/19 0214   Present Prior to Hospital Arrival?: Yes  Location: Right upper arm  Additional Comments: AV fistula   Needle Size 16ga   Site Assessment Clean;Dry;Intact;No redness   Patency Present;Thrill;Bruit   Status Deaccessed   Flows Good   Dressing Intervention First dressing   Site Condition No complications   Dressing Gauze   Post-Hemodialysis Assessment   Rinseback Volume (mL) 250 mL   Blood Volume Processed (Liters) 50 L   Dialyzer Clearance Lightly streaked   Duration of Treatment (minutes) 180 minutes   Additional Fluid Intake (mL) 500 mL   Total UF (mL) 2000 mL   Net Fluid Removal 1500   Patient Response to Treatment Tolerated well   Post-Treatment Weight 56.9 kg (125 lb 7.1 oz)   Treatment Weight Change -1.5   Arterial bleeding stop time (min) 5 min   Venous bleeding stop time (min) 5 min   Post-Hemodialysis Comments Tx ended per protocol, all blood returned without incident.

## 2022-04-01 NOTE — CONSULTS
Consult Note  Infectious Disease    Reason for Consult:  C diff    HPI: Griselda Neely is a 76 y.o. female with recent admissions for Diverticulitis November 2021 and pancreatitis in late January.  She was admitted in early March for 2 days with a history of vomiting and diarrhea, shortness of breath, and was found to be in uncontrolled atrial fibrillation congestive heart failure, pulmonary edema requiring Cardizem drip and additional dialysis.  She presented to the emergency room at the advice of her primary provider for complaints of diarrhea beginning while she was at dialysis yesterday.  She also had nausea and vomiting, abdominal pain, worse in the epigastric area.  The GI symptoms began earlier in the week and she treated herself with Imodium.  She denies recent antibiotic therapy (likely since November) are exposure to anyone else with a diarrheal illness.  She denies suspicious meals.  In the emergency room she was afebrile mildly hypotensive requiring her usual home oxygen supplementation of 4 L. She had a normal white blood cell count very elevated BNP of greater than 4500 and AFib with RVR at 120. Stool for C diff was found to be positive for the antigen negative for the toxin and PCR is now positive.   Chest x-ray showed chronic right opacities consistent with the substantial and partially loculated pleural effusion on the right the, KUB was unremarkable but CT scan of the abdomen pelvis and chest did show the right-sided effusions, extensive calcification of the mesenteric vessels, mild thickening of the sigmoid colon and a possible sigmoid diverticulitis.  She was started on oral vancomycin and given a dose of Zosyn yesterday but this has not been continued.  She has no fever her white blood cells are normal, lactic acid was normal, lipase was ordered but not performed (I called the lab and they will run it now).  Two stools have been recorded overnight.    Review of patient's allergies  indicates:  No Known Allergies  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    both legs    CHF (congestive heart failure)     Diverticulitis 11/2021    Encounter for blood transfusion 03/2016    Gout     Hemodialysis access, AV graft     Hypertension     Mitral valve regurgitation     Osteoarthritis     Pancreatitis     Peripheral vascular disease     Pneumonia 09/09/2017    Renal failure      Past Surgical History:   Procedure Laterality Date    ACHILLES TENDON SURGERY Right     ANGIOGRAPHY OF ARTERIOVENOUS SHUNT Right 1/7/2020    Procedure: Fistulogram with Possible Intervention;  Surgeon: Joseph Loyola MD;  Location: [facility] CATH/EP LAB;  Service: Cardiology;  Laterality: Right;    ANGIOGRAPHY OF LOWER EXTREMITY Left 3/18/2020    Procedure: Angiogram Extremity Unilateral;  Surgeon: Ali Khoobehi, MD;  Location: [facility] CATH/EP LAB;  Service: Cardiology;  Laterality: Left;    APPENDECTOMY      CARDIAC CATHETERIZATION  07/03/2017    CHOLECYSTECTOMY      COLONOSCOPY Left 10/4/2020    Procedure: COLONOSCOPY;  Surgeon: Jordan Kilpatrick MD;  Location: [facility];  Service: Endoscopy;  Laterality: Left;    ESOPHAGOGASTRODUODENOSCOPY Left 8/17/2020    Procedure: EGD (ESOPHAGOGASTRODUODENOSCOPY);  Surgeon: Talat Trevizo MD;  Location: [facility];  Service: Endoscopy;  Laterality: Left;    ESOPHAGOGASTRODUODENOSCOPY Left 10/2/2020    Procedure: EGD (ESOPHAGOGASTRODUODENOSCOPY);  Surgeon: Talat Trevizo MD;  Location: [facility];  Service: Endoscopy;  Laterality: Left;    ESOPHAGOGASTRODUODENOSCOPY N/A 6/29/2021    Procedure: EGD (ESOPHAGOGASTRODUODENOSCOPY);  Surgeon: Sudheer Brown III, MD;  Location: [facility];  Service: Endoscopy;  Laterality: N/A;    heal surgery Right     HYSTERECTOMY      INSERTION OF STENT INTO PERIPHERAL VESSEL N/A 1/7/2020    Procedure: INSERTION, STENT, VESSEL, PERIPHERAL;  Surgeon: Josehp Loyola MD;   Location: [facility] CATH/EP LAB;  Service: Cardiology;  Laterality: N/A;    MITRAL VALVE REPLACEMENT      PARATHYROIDECTOMY      PARATHYROIDECTOMY  07/13/2017    PERCUTANEOUS TRANSLUMINAL ANGIOPLASTY OF ARTERIOVENOUS FISTULA N/A 1/7/2020    Procedure: PTA, AV FISTULA;  Surgeon: Joseph Loyola MD;  Location: [facility] CATH/EP LAB;  Service: Cardiology;  Laterality: N/A;    PERITONEAL CATHETER INSERTION      RENAL BIOPSY      SMALL BOWEL ENTEROSCOPY N/A 12/2/2020    Procedure: ENTEROSCOPY;  Surgeon: Sudheer Brown III, MD;  Location: [facility] ENDO;  Service: Endoscopy;  Laterality: N/A;    vocal cord nodule      WOUND DEBRIDEMENT Left 7/13/2020    Procedure: DEBRIDEMENT, WOUND;  Surgeon: Carlos Elam III, MD;  Location: [facility] OR;  Service: General;  Laterality: Left;     Social History     Socioeconomic History    Marital status:    Tobacco Use    Smoking status: Current Some Day Smoker     Packs/day: 0.50     Years: 45.00     Pack years: 22.50     Types: Cigarettes    Smokeless tobacco: Never Used   Substance and Sexual Activity    Alcohol use: No    Drug use: No    Sexual activity: Not Currently     Family History   Problem Relation Age of Onset    Heart disease Sister     Early death Sister         heart as baby    Heart disease Maternal Grandfather     Diabetes Mother     Hypertension Mother     Heart failure Mother     Heart disease Mother     Arthritis Mother     Diabetes Father     Early death Sister         infant         Review of Systems:   No chills, fever, sweats,   No change in vision,      No pain in mouth or throat.  .  No chest pain,  syncope and she notes that her heart is always fast.  Minimal cough, clear sputum production, baseline shortness of breath, dyspnea on exertion,     nausea, vomiting, diarrhea, without obvious blood in stool, and epigastric abd pain,  No dysphagia, odynophagia     No swelling of joints, redness of joints, injuries,    No unusual headaches,     No anxiety, depression, but she is already ready to go home.  She wants to attend a  tomorrow.  No diabetes,    No obvious bleeding  malignancy, unusual bruising  No new rashes,     Implants:  Bioprosthetic mitral valve  Antibiotic History:  None that I can tell since 2021    EXAM & DIAGNOSTICS REVIEWED:   Vitals:     Temp:  [97.4 °F (36.3 °C)-98.3 °F (36.8 °C)]   Temp: 97.4 °F (36.3 °C) (22)  Pulse: 109 (22)  Resp: 18 (22)  BP: (!) 131/59 (22)  SpO2: 97 % (22)  No intake or output data in the 24 hours ending 22    General:  In NAD. Alert and attentive, cooperative, comfortable  Eyes:  Anicteric, PERRL, EOMI  ENT:  No ulcers, exudates, thrush, nares patent,  S dentures in place  Neck:  supple, no masses or adenopathy appreciated  Lungs: Clear decreased breath sounds  Heart:  iRRR, no gallop/murmur/rub noted  Abd:  Soft, tender in the epigastrium and slightly in the left lower quadrant, mildly distended mildly tympanitic , normal BS, no masses or organomegaly appreciated.  :  Voids  no flank tenderness  Musc:  Joints without effusion, swelling, erythema, synovitis,   , with poor muscle bulk  Skin:  No rashes. No palmar or plantar lesions, nails are painted  Neuro:             Alert, attentive, speech fluent, face symmetric, moves all extremities, no focal weakness. Ambulatory  Psych: Calm, cooperative  Lymphatic:     No cervical, supraclavicular  nodes  Extrem: No edema, erythema, phlebitis, cellulitis, warm and well perfused  VAD:  Peripheral.  Right arm AV fistula     Isolation:  Special contact  Wound:       Lines/Tubes/Drains:    General Labs reviewed:  Recent Labs   Lab 22  0507   WBC 5.28 4.70   HGB 11.5* 11.0*   HCT 36.8* 35.6*    155       Recent Labs   Lab 22  0507   * 135*   K 5.0 4.2   CL 93* 93*   CO2 25 24   BUN 49* 51*   CREATININE 7.3* 7.4*   CALCIUM 8.0* 7.7*    PROT 8.3  --    BILITOT 1.5*  --    ALKPHOS 77  --    ALT 8*  --    AST 17  --      No results for input(s): CRP in the last 168 hours.      Micro:  Microbiology Results (last 7 days)     Procedure Component Value Units Date/Time    C Diff Toxin by PCR [[id number]]  (Abnormal) Collected: 03/31/22 2354    Order Status: Completed Updated: 04/01/22 0903     C. diff PCR Positive     Comment: POS C. DIFF BY PCR  critical result(s) called and verbal readback   obtained from KAYLA [email or web address] by CD3 04/01/2022 09:03         Narrative:        POS C. DIFF BY PCR  critical result(s) called and verbal readback   obtained from KAYLA [email or web address] by CD3 04/01/2022 09:03    Clostridium difficile EIA [[id number]]  (Abnormal) Collected: 03/31/22 2354    Order Status: Completed Specimen: Stool Updated: 04/01/22 0157     C. diff Antigen Positive     C difficile Toxins A+B, EIA Negative     Comment: Testing not recommended for children <24 months old.       Blood culture #1 **CANNOT BE ORDERED STAT** [[id number]] Collected: 04/01/22 0114    Order Status: Sent Specimen: Blood from Line, Femoral, Left Updated: 04/01/22 0125    Blood culture #2 **CANNOT BE ORDERED STAT** [[id number]] Collected: 04/01/22 0114    Order Status: Sent Specimen: Blood from Peripheral, Antecubital, Left Updated: 04/01/22 0125    Stool culture [[id number]]     Order Status: No result Specimen: Stool           Imaging Reviewed:   CXR the   CTa chest abdomen and pelvis  1: Marked cardiomegaly.  2: Right greater than left pleural effusions. Right pleural effusion remains loculated.  3: Right lung infiltrate which could represent pneumonia.  4: Possible cirrhosis of the liver.  5: Severely atrophic kidneys.  6: Increased abdominal ascites over January 2022.  7: Diverticulosis of the sigmoid colon with thickening suggesting sigmoid diverticulitis without complication.  8: Extensive arterial vascular disease of the aorta and branch vessels with high-grade  bilateral renal artery stenosis, severe stenosis of the superior mesenteric artery and moderate to high-grade stenosis of the celiac artery.       Cardiology:    IMPRESSION & PLAN   1.  C difficile Colitis   I do not believe she has diverticulitis   History of pancreatitis, lipase pending  2.  End-stage renal disease on dialysis  3.  Chronic systolic and diastolic heart failure, chronic pleural effusions, ascites  4.  Chronic hypoxic respiratory failure on home oxygen, continuing to smoke  5.  Bioprosthetic mitral valve in situ  6.  Long-term use of oral anticoagulation, chronically positive stool for occult blood, hemoglobin stable      Recommendations:  Continue oral vancomycin 10-14 days  Adding oral probiotics  Advancing diet  Fluid management per Nephrology    Counseled that she should not attend a  tomorrow given the potential contagious nature of her colitis    Medical Decision Making during this encounter was  [_] Low Complexity  [_] Moderate Complexity  [ xx ] High Complexity

## 2022-04-02 NOTE — CONSULTS
GASTROENTEROLOGY INPATIENT CONSULT NOTE  Patient Name: Griselda Neely  Patient MRN: [id number]  Patient : 1945    Admit Date: 3/31/2022  Service date: 2022    Reason for Consult: anemia    PCP: Denver Cerna NP    Chief Complaint   Patient presents with    Chest Pain     Chest pain x 1 day with sob, abdominal pain and diarrhea.        HPI: Patient is a 76 y.o. female with PMHx GERD, hypotension (midodrine), ESRD (MWF; Right AVF), CHF on home O2, cholecystectomy, pancreatitis, appendectomy, LINDSAY, diverticulosis, UGI/colon AVM, Left atrial thrombus (Bethala; coumadin), chronic anemia, mesenteric vascular dz (suspected intestinal angina from HD) presents for evaluation of abd cramping and loose stools. Acute onset, intermittent, progressive on admission, now resolving. No signs of bleeding currently per patient. . Seen in f/u over past year and refused any further endoscopy including colonoscopy for known polyps.     CHART REVIEW:  C.diff +   CT Ch / Abd  - cardiomegaly; effusions; ?cirrhosis?; atrophic kidneys, increase abd ascites; extensive vascular dz  CT  - s/p LINDSAY / missael; tics; vascular dz; nml liver, panc, biliary, bowels  PUSH  - gastric / duodenal avm's s/p ablation; old blood in stomach.  Colon 10/'20 - Polyps NOT REMOVED: ascending AVM s/p ablation and sig diverticulosis;  EGD 10/'20 - min gastritis  CT ABd  - pleural effusions; cardiomegaly; Left atrial thrombus; vascular disease; tics;  RUQ u/s - prominence in head of pancreas; normal bile ducts; s/p missael    Past Medical History:  Past Medical History:   Diagnosis Date    Anemia     Atrial fibrillation     Calciphylaxis 2017    both legs    CHF (congestive heart failure)     Diverticulitis 2021    Encounter for blood transfusion 2016    Gout     Hemodialysis access, AV graft     Hypertension     Mitral valve regurgitation     Osteoarthritis     Pancreatitis     Peripheral vascular disease      Pneumonia 09/09/2017    Renal failure         Past Surgical History:  Past Surgical History:   Procedure Laterality Date    ACHILLES TENDON SURGERY Right     ANGIOGRAPHY OF ARTERIOVENOUS SHUNT Right 1/7/2020    Procedure: Fistulogram with Possible Intervention;  Surgeon: Joseph Loyola MD;  Location: [facility] CATH/EP LAB;  Service: Cardiology;  Laterality: Right;    ANGIOGRAPHY OF LOWER EXTREMITY Left 3/18/2020    Procedure: Angiogram Extremity Unilateral;  Surgeon: Ali Khoobehi, MD;  Location: [facility] CATH/EP LAB;  Service: Cardiology;  Laterality: Left;    APPENDECTOMY      CARDIAC CATHETERIZATION  07/03/2017    CHOLECYSTECTOMY      COLONOSCOPY Left 10/4/2020    Procedure: COLONOSCOPY;  Surgeon: Jordan Kilpatrick MD;  Location: [facility] ENDO;  Service: Endoscopy;  Laterality: Left;    ESOPHAGOGASTRODUODENOSCOPY Left 8/17/2020    Procedure: EGD (ESOPHAGOGASTRODUODENOSCOPY);  Surgeon: Talat Trevizo MD;  Location: [facility] ENDO;  Service: Endoscopy;  Laterality: Left;    ESOPHAGOGASTRODUODENOSCOPY Left 10/2/2020    Procedure: EGD (ESOPHAGOGASTRODUODENOSCOPY);  Surgeon: Talat Trevizo MD;  Location: [facility];  Service: Endoscopy;  Laterality: Left;    ESOPHAGOGASTRODUODENOSCOPY N/A 6/29/2021    Procedure: EGD (ESOPHAGOGASTRODUODENOSCOPY);  Surgeon: Sudheer Brown III, MD;  Location: [facility] ENDO;  Service: Endoscopy;  Laterality: N/A;    heal surgery Right     HYSTERECTOMY      INSERTION OF STENT INTO PERIPHERAL VESSEL N/A 1/7/2020    Procedure: INSERTION, STENT, VESSEL, PERIPHERAL;  Surgeon: Joseph Loyola MD;  Location: [facility] CATH/EP LAB;  Service: Cardiology;  Laterality: N/A;    MITRAL VALVE REPLACEMENT      PARATHYROIDECTOMY      PARATHYROIDECTOMY  07/13/2017    PERCUTANEOUS TRANSLUMINAL ANGIOPLASTY OF ARTERIOVENOUS FISTULA N/A 1/7/2020    Procedure: PTA, AV FISTULA;  Surgeon: Joseph Loyola MD;  Location: [facility] CATH/EP LAB;  Service: Cardiology;  Laterality: N/A;    PERITONEAL CATHETER INSERTION       RENAL BIOPSY      SMALL BOWEL ENTEROSCOPY N/A 12/2/2020    Procedure: ENTEROSCOPY;  Surgeon: Sudheer Brown III, MD;  Location: [facility] ENDO;  Service: Endoscopy;  Laterality: N/A;    vocal cord nodule      WOUND DEBRIDEMENT Left 7/13/2020    Procedure: DEBRIDEMENT, WOUND;  Surgeon: Carlos Elam III, MD;  Location: [facility] OR;  Service: General;  Laterality: Left;        Home Medications:  Medications Prior to Admission   Medication Sig Dispense Refill Last Dose    albuterol (ACCUNEB) 1.25 mg/3 mL Nebu Take 1.25 mg by nebulization every 6 (six) hours as needed. Rescue  HCS       albuterol (PROVENTIL/VENTOLIN HFA) 90 mcg/actuation inhaler Inhale 1 puff into the lungs every 4 (four) hours as needed for Wheezing. rescue       aspirin (ECOTRIN) 81 MG EC tablet Take 81 mg by mouth once daily.       calcitRIOL (ROCALTROL) 0.5 MCG Cap Take 1 capsule (0.5 mcg total) by mouth every Mon, Wed, Fri. 12 capsule 2     calcium acetate (PHOSLO) 667 mg capsule Take 667 mg by mouth once daily.        diltiaZEM (CARDIZEM) 30 MG tablet Take 1 tablet (30 mg total) by mouth 4 (four) times daily before meals and nightly. 120 tablet 11     isosorbide mononitrate (IMDUR) 30 MG 24 hr tablet Take 1 tablet (30 mg total) by mouth once daily. 90 tablet 3     LORazepam (ATIVAN) 0.5 MG tablet Take 1 tablet (0.5 mg total) by mouth every 6 (six) hours as needed for Anxiety. 30 tablet 0     losartan (COZAAR) 25 MG tablet Take 1 tablet (25 mg total) by mouth every evening. 90 tablet 1     metoprolol succinate (TOPROL-XL) 25 MG 24 hr tablet Take 1 tablet by mouth nightly 90 tablet 0     ondansetron (ZOFRAN) 4 MG tablet Take 1 tablet (4 mg total) by mouth every 6 (six) hours as needed for Nausea. 12 tablet 0     oxyCODONE-acetaminophen (PERCOCET) 5-325 mg per tablet Take 1 tablet by mouth every 4 (four) hours as needed for Pain. 10 tablet 0     pantoprazole (PROTONIX) 40 MG tablet Take 1 tablet by mouth once daily 90 tablet 0      warfarin (COUMADIN) 5 MG tablet Take 1 tablet (5 mg total) by mouth Daily. 30 tablet 0        Inpatient Medications:   aspirin  81 mg Oral Daily    calcitRIOL  0.5 mcg Oral Every Mon, Wed, Fri    calcium acetate(phosphat bind)  667 mg Oral Daily    cyanocobalamin  1,000 mcg Oral Daily    diltiaZEM  30 mg Oral QID (AC & HS)    [START ON 4/4/2022] epoetin alfonzo-epbx  50 Units/kg Subcutaneous Every Mon, Wed, Fri    [START ON 4/4/2022] famotidine  20 mg Oral Every Mon, Wed, Fri    isosorbide mononitrate  30 mg Oral Daily    losartan  25 mg Oral QHS    metoprolol succinate  25 mg Oral Nightly    mupirocin   Nasal BID    Saccharomyces boulardii  250 mg Oral with lunch    vancomycin  125 mg Oral Q6H     acetaminophen, albuterol, glycerin-dimeth-urea-w.pet-alc, LORazepam, naloxone, ondansetron, sodium chloride 0.9%    Review of patient's allergies indicates:  No Known Allergies    Social History:   Social History     Occupational History    Not on file   Tobacco Use    Smoking status: Current Some Day Smoker     Packs/day: 0.50     Years: 45.00     Pack years: 22.50     Types: Cigarettes    Smokeless tobacco: Never Used   Substance and Sexual Activity    Alcohol use: No    Drug use: No    Sexual activity: Not Currently       Family History:   Family History   Problem Relation Age of Onset    Heart disease Sister     Early death Sister         heart as baby    Heart disease Maternal Grandfather     Diabetes Mother     Hypertension Mother     Heart failure Mother     Heart disease Mother     Arthritis Mother     Diabetes Father     Early death Sister         infant       Review of Systems:  A 10 point review of systems was performed and was normal, except as mentioned in the HPI, including constitutional, HEENT, heme, lymph, cardiovascular, respiratory, gastrointestinal, genitourinary, neurologic, endocrine, psychiatric and musculoskeletal.      OBJECTIVE:    Physical Exam:  24 Hour Vital Sign  "Ranges: Temp:  [97.4 °F (36.3 °C)-98.3 °F (36.8 °C)] 97.6 °F (36.4 °C)  Pulse:  [54-86] 54  Resp:  [16-18] 18  SpO2:  [96 %-100 %] 97 %  BP: ()/(49-68) 123/49  Most recent vitals: BP (!) 123/49 (BP Location: Left arm, Patient Position: Sitting)   Pulse (!) 54   Temp 97.6 °F (36.4 °C) (Oral)   Resp 18   Ht 5' 5" (1.651 m)   Wt 58.4 kg (128 lb 12 oz)   LMP  (LMP Unknown)   SpO2 97%   Breastfeeding No   BMI 21.42 kg/m²    GEN: well-developed, well-nourished, awake and alert, non-toxic appearing adult BF  HEENT: PERRL, sclera anicteric, oral mucosa pink and moist without lesion  NECK: trachea midline; Good ROM  CV: regular rate and rhythm, no murmurs or gallops  RESP: clear to auscultation bilaterally, no wheezes, rhonci or rales  ABD: soft, min-tender, non-distended, normal bowel sounds  EXT: no swelling or edema, 1+ pulses distally  SKIN: no rashes or jaundice  PSYCH: normal affect    Labs:   Recent Labs     03/31/22 2255 04/01/22  0507 04/02/22  0441   WBC 5.28 4.70 3.78*   MCV 95 95 95    155 121*     Recent Labs     03/31/22 2255 04/01/22  0507 04/02/22  0441   * 135* 137   K 5.0 4.2 3.6   CL 93* 93* 97   CO2 25 24 26   BUN 49* 51* 30*   GLU 75 70 63*     No results for input(s): ALB in the last 72 hours.    Invalid input(s): ALKP, SGOT, SGPT, TBIL, DBIL, TPRO  Recent Labs     03/31/22 2255   INR 1.6         Radiology Review:  CTA Chest Abdomen Pelvis   Final Result      X-Ray Chest AP Portable   Final Result      1. Unchanged cardiomegaly.   2. Improvement of pulmonary alveolar opacities and central pulmonary vascular prominence suggesting improving pulmonary edema either related to heart failure or hypervolemia.  Superimposed infectious or inflammatory pneumonia can also be considered.   3. Unchanged tiny right pleural effusion.         Electronically signed by: Leonel Aguilar MD   Date:    04/01/2022   Time:    07:00      X-Ray KUB   Final Result      No specific signs of obstruction " or ileus.         Electronically signed by: Leonel Aguilar MD   Date:    04/01/2022   Time:    07:01            IMPRESSION / RECOMMENDATIONS:  76 y.o. female with PMHx GERD, hypotension (midodrine), ESRD (MWF; Right AVF), CHF on home O2, cholecystectomy, pancreatitis, appendectomy, LINDSAY, diverticulosis, UGI/colon AVM, Left atrial thrombus (Bethala; coumadin), chronic anemia, mesenteric vascular dz (suspected intestinal angina from HD) presents for evaluation of abd cramping and loose stools. C. Diff + on admission w/ good response to vanco. No signs of bleeding and despite slight drop, not far from baseline. Offered endoscopic evaluation similar to past but refused by patient. Delay in dx / risks discussed.     -Continue Vancomycin x 10 days at d/c  -Ok to continue coumadin given CV disease an no signs of bleeding  -F/u clinic 2 weeks w/ CBC to ensure not decreasing further    Thank you for this consult.    Sudheer SALAZAR Daazaliaive III  4/2/2022  2:53 PM

## 2022-04-02 NOTE — PROGRESS NOTES
[facility] Medicine  Progress Note    Patient name: Griselda Neely  MRN: [id number]  Admit Date: 3/31/2022   LOS: 0 days     SUBJECTIVE:     Principal problem: C. difficile colitis    Interval History:  Patient states she is better, no diarrhea today. Denies chest pain.    Scheduled Meds:   aspirin  81 mg Oral Daily    calcitRIOL  0.5 mcg Oral Every Mon, Wed, Fri    calcium acetate(phosphat bind)  667 mg Oral Daily    diltiaZEM  30 mg Oral QID (AC & HS)    isosorbide mononitrate  30 mg Oral Daily    losartan  25 mg Oral QHS    metoprolol succinate  25 mg Oral Nightly    mupirocin   Nasal BID    Saccharomyces boulardii  250 mg Oral with lunch    vancomycin  125 mg Oral Q6H     Continuous Infusions:  PRN Meds:acetaminophen, albuterol, glycerin-dimeth-urea-w.pet-alc, LORazepam, naloxone, ondansetron, sodium chloride 0.9%    Review of patient's allergies indicates:  No Known Allergies    Review of Systems: As per interval history    OBJECTIVE:     Vital Signs (Most Recent)  Temp: 97.4 °F (36.3 °C) (04/01/22 1640)  Pulse: 66 (04/01/22 1640)  Resp: 18 (04/01/22 1640)  BP: 122/66 (04/01/22 1640)  SpO2: 97 % (04/01/22 1640)    Vital Signs Range (Last 24H):  Temp:  [97.4 °F (36.3 °C)-98.3 °F (36.8 °C)]   Pulse:  []   Resp:  [18-20]   BP: ()/(54-76)   SpO2:  [91 %-100 %]     I & O (Last 24H):    Intake/Output Summary (Last 24 hours) at 4/1/2022 1936  Last data filed at 4/1/2022 1750  Gross per 24 hour   Intake 700 ml   Output 2000 ml   Net -1300 ml       Physical Exam:  General: Patient resting comfortably in no acute distress. Appears as stated age. Calm  Eyes: No conjunctival injection. No scleral icterus.  ENT: Hearing grossly intact. No discharge from ears. No nasal discharge.   Neck: Supple, trachea midline. No JVD  CVS: RRR. No LE edema BL  Lungs:  No tachypnea or accessory muscle use.  Clear to auscultation bilaterally  Abdomen:  Soft, nontender and nondistended.  Neuro:  "AOx3. Moves all extremities. Follows commands. Responds appropriately   Skin:  No rash or erythema noted    Laboratory:  CBC:   Recent Labs   Lab 04/01/22  0507   WBC 4.70   RBC 3.76*   HGB 11.0*   HCT 35.6*      MCV 95   MCH 29.3   MCHC 30.9*     BMP:   Recent Labs   Lab 04/01/22  0507   GLU 70   *   K 4.2   CL 93*   CO2 24   BUN 51*   CREATININE 7.4*   CALCIUM 7.7*   MG 1.6       Diagnostic Results:  Labs: Reviewed  ECG: Reviewed  X-Ray: Reviewed    ASSESSMENT/PLAN:     Griselda Neely is a 76 y.o. female admitted for     Active problems/reason for admission  C. Diff colitis  GI bleed. H.o. GI bleed in 2020, with small bowel enteroscopy showing Two bleeding angiodysplastic lesions in the                         stomach. Treated with argon plasma coagulation                         (APC). Clip was placed.                         - Normal stomach.                         - Normal examined duodenum.                         - A single non-bleeding angioectasia in the                         duodenum. Treated with argon plasma coagulation                         (APC).... " Dark pigmented material                         in stomach w/ oozing AVM c/w stomach AVM as likely                         source of ongoing melena / anemia"   Afib with RVR        Chronic problems  HTN  COPD with home oxygen  CHF  ESRD on HD   Afib  MVR  PVD  Anemia           Plan  Consult GI due to anemia with + guayac on anticioagulation with h.o. as per assessment.  Prn analgesics  Oral vancomycin  Will follow ID recommendneda, inp[ut appreciated.  Patient tolerating PO at this time; no IVF as she is ESRD and requires 4LNC; bilateral pleural effusions on CT scan  Restart home diltiazem and metoprolol  Hold anticoagulants  Follow h/h, trend troponin.  Fe/B12/FA levels.  Consult nephrology to manage HD  Restart home medications as ordered for chronic conditions        Diet: clear liquid     DVT Prophylaxis: mechanical DVT " prophylaxis. Encourage ambulation and OOB as tolerated.                                 Department Hospital Medicine  [facility]  Kulwinder Long MD  Date of service: 04/01/2022        Please note: This note was transcribed using voice recognition software. Because of this technology, there are often uinintended grammatical, spelling, and other transcription errors. Please disregard these errors.

## 2022-04-02 NOTE — PROGRESS NOTES
[facility]  Nephrology  Progress Note    Patient Name: Griselda Neely  MRN: [id number]  Admission Date: 3/31/2022  Hospital Length of Stay: 0 days  Attending Provider: Kulwinder Long MD   Primary Care Physician: Denver Cerna NP  Principal Problem:C. difficile colitis    Consults  Subjective:     Interval History:   No acute events overnight   Patient had stable iHD session yesterday - no indications for RRT this AM   Hgb decreased from 11 --> 9.7g/dL this AM     Review of patient's allergies indicates:  No Known Allergies  Current Facility-Administered Medications   Medication Frequency    acetaminophen tablet 650 mg Q4H PRN    albuterol nebulizer solution 1.25 mg Q6H PRN    aspirin EC tablet 81 mg Daily    calcitRIOL capsule 0.5 mcg Every Mon, Wed, Fri    calcium acetate(phosphat bind) capsule 667 mg Daily    cyanocobalamin tablet 1,000 mcg Daily    diltiaZEM tablet 30 mg QID (AC & HS)    [START ON 4/4/2022] epoetin alfonzo-epbx injection 2,900 Units Every Mon, Wed, Fri    famotidine tablet 20 mg Daily    glycerin-dimeth-urea-w.pet-alc Lotn Daily PRN    isosorbide mononitrate 24 hr tablet 30 mg Daily    LORazepam tablet 0.5 mg Q6H PRN    losartan tablet 25 mg QHS    metoprolol succinate (TOPROL-XL) 24 hr tablet 25 mg Nightly    mupirocin 2 % ointment BID    naloxone 0.4 mg/mL injection 0.02 mg PRN    ondansetron injection 4 mg Q6H PRN    Saccharomyces boulardii capsule 250 mg with lunch    sodium chloride 0.9% flush 10 mL PRN    vancomycin 125 mg/5 mL oral solution 125 mg Q6H       Objective:     Vital Signs (Most Recent):  Temp: 97.9 °F (36.6 °C) (04/02/22 0851)  Pulse: 81 (04/02/22 0851)  Resp: 17 (04/02/22 0851)  BP: 104/67 (04/02/22 0851)  SpO2: 99 % (04/02/22 0851)  O2 Device (Oxygen Therapy): room air (04/02/22 0851) Vital Signs (24h Range):  Temp:  [97.4 °F (36.3 °C)-98.3 °F (36.8 °C)] 97.9 °F (36.6 °C)  Pulse:  [57-94] 81  Resp:  [16-18] 17  SpO2:  [96 %-100 %] 99  %  BP: ()/(54-68) 104/67     Weight: 58.4 kg (128 lb 12 oz) (04/01/22 0400)  Body mass index is 21.42 kg/m².  Body surface area is 1.64 meters squared.    I/O last 3 completed shifts:  In: 700 [P.O.:200; Other:500]  Out: 2000 [Other:2000]      General:         In NAD, alert and oriented   Eyes:               PERRL, EOMI   ENT:                No ulcers, exudates, thrush, nares patent,   dentures in place  Neck:               Supple    Lungs:             CTA bilaterally with mild wheezing on right   Heart:              RRR   Abd:                 Soft, non tender, mildly distended   :                  No núñez on place   Musc:              No LE/ dependent edema   Skin:                Dry, intact, no rashes   Neuro:             No focal deficits   Psych:              Normal mood and affect   Extrem:           No edema, erythema, phlebitis, cellulitis, warm and well perfused  Access:  RUE AVF with (+) thrill and bruit                         Significant Labs:sure  BMP:   Recent Labs   Lab 04/02/22  0441   GLU 63*      K 3.6   CL 97   CO2 26   BUN 30*   CREATININE 5.8*   CALCIUM 8.0*   MG 1.8     CBC:   Recent Labs   Lab 04/02/22  0441   WBC 3.78*   RBC 3.30*   HGB 9.7*  9.8*   HCT 31.0*  31.2*   *   MCV 95   MCH 29.7   MCHC 31.4*     CMP:   Recent Labs   Lab 03/31/22  2255 04/01/22  0507 04/02/22  0441   GLU 75   < > 63*   CALCIUM 8.0*   < > 8.0*   ALBUMIN 3.5  --   --    PROT 8.3  --   --    *   < > 137   K 5.0   < > 3.6   CO2 25   < > 26   CL 93*   < > 97   BUN 49*   < > 30*   CREATININE 7.3*   < > 5.8*   ALKPHOS 77  --   --    ALT 8*  --   --    AST 17  --   --    BILITOT 1.5*  --   --     < > = values in this interval not displayed.     All labs within the past 24 hours have been reviewed.    Significant Imaging:  KUB 4/1:  FINDINGS:   Supine abdomen compared with 04/14/2019 shows nonobstructive bowel gas pattern.  No intra-mass effect organomegaly.  Vascular calcifications are present.   Degenerative changes affect the spine.    Impression:       No specific signs of obstruction or ileus.          Assessment/Plan:     Active Diagnoses:    Diagnosis Date Noted POA    PRINCIPAL PROBLEM:  C. difficile colitis [A04.72] 05/31/2016 Unknown    Epigastric pain [R10.13]  Yes    Atrial fibrillation [I48.91] 10/13/2021 Yes     Chronic    Rectal bleeding [K62.5] 11/07/2020 Yes    Diarrhea [R19.7] 05/28/2016 Yes    ESRD (end stage renal disease) on HD M,W, F [N18.6] 05/11/2016 Yes     Chronic      Problems Resolved During this Admission:        C. Diff colitis  -on PO Vancomycin - being followed by ID. GI consulted      ESRD  -patient had stable iHD session yesterday  -no urgent indications for RRT this - will continue on MWF iHD schedule while admitted   -next iHD session Monday 4/4 (if she remains admitted)      HTN  -controlled      Anemia, renal failure  -Fe replete based on most recent labs   -Hgb dropped to 9.7g/dL (will restart DONNY with next iHD session)      Sec HPT  -serum Ca and Phos at goal   -on Phos binders   -secondary hyperparathyroidism - continue Calcitriol      Will continue to follow, please call with any questions or problems   OK to be discharged from Nephrology standpoint - if OK with other services     Javier Santiago, DO  Nephrology  Formerly [facility]

## 2022-04-02 NOTE — PLAN OF CARE
04/01/22 2049   Patient Assessment/Suction   Level of Consciousness (AVPU) alert   Respiratory Effort Unlabored   Expansion/Accessory Muscles/Retractions expansion symmetric   All Lung Fields Breath Sounds coarse   Rhythm/Pattern, Respiratory depth regular;pattern regular   Cough Frequency infrequent   Cough Type good;nonproductive   PRE-TX-O2   O2 Device (Oxygen Therapy) nasal cannula   Flow (L/min) 4   SpO2 96 %   Pulse 65   Resp 16

## 2022-04-02 NOTE — PROGRESS NOTES
Consult Note  Infectious Disease    Reason for Consult:  C diff    HPI: Griselda Neely is a 76 y.o. female with recent admissions for Diverticulitis November 2021 and pancreatitis in late January.  She was admitted in early March for 2 days with a history of vomiting and diarrhea, shortness of breath, and was found to be in uncontrolled atrial fibrillation congestive heart failure, pulmonary edema requiring Cardizem drip and additional dialysis.  She presented to the emergency room at the advice of her primary provider for complaints of diarrhea beginning while she was at dialysis yesterday.  She also had nausea and vomiting, abdominal pain, worse in the epigastric area.  The GI symptoms began earlier in the week and she treated herself with Imodium.  She denies recent antibiotic therapy (likely since November) are exposure to anyone else with a diarrheal illness.  She denies suspicious meals.  In the emergency room she was afebrile mildly hypotensive requiring her usual home oxygen supplementation of 4 L. She had a normal white blood cell count very elevated BNP of greater than 4500 and AFib with RVR at 120. Stool for C diff was found to be positive for the antigen negative for the toxin and PCR is now positive.   Chest x-ray showed chronic right opacities consistent with the substantial and partially loculated pleural effusion on the right the, KUB was unremarkable but CT scan of the abdomen pelvis and chest did show the right-sided effusions, extensive calcification of the mesenteric vessels, mild thickening of the sigmoid colon and a possible sigmoid diverticulitis.  She was started on oral vancomycin and given a dose of Zosyn yesterday but this has not been continued.  She has no fever her white blood cells are normal, lactic acid was normal, lipase was ordered but not performed (I called the lab and they will run it now).  Two stools have been recorded overnight.    4/2: interim reviewed. Afebrile. No stools  recorded yesterday and she cannot recall having any overnight. She reports a formed stool this morning. She is eating solid food.   hgb is lower by 1 gram. She tells me she was dialyzed yesterday.    EXAM & DIAGNOSTICS REVIEWED:   Vitals:     Temp:  [97.4 °F (36.3 °C)-98.3 °F (36.8 °C)]   Temp: 97.9 °F (36.6 °C) (04/02/22 0851)  Pulse: 81 (04/02/22 0851)  Resp: 17 (04/02/22 0851)  BP: 104/67 (04/02/22 0851)  SpO2: 99 % (04/02/22 0851)    Intake/Output Summary (Last 24 hours) at 4/2/2022 0944  Last data filed at 4/2/2022 0845  Gross per 24 hour   Intake 1040 ml   Output 2000 ml   Net -960 ml       General:  In NAD. Alert and attentive, cooperative, comfortable  Eyes:  Anicteric,  EOMI  ENT:  No ulcers, exudates, thrush, nares patent,   dentures in place  Neck:  supple,    Lungs: Squeaks and rhonchi on the right, minimal on left. She reports that the right always sounds worse.   Heart:  iRRR, no gallop/murmur/rub noted  Abd:  Soft, non tender , minimally distended  , normal BS,   :  Voids     Musc:  Joints without effusion, swelling, erythema, synovitis,   , with poor muscle bulk  Skin:  No rashes.  nails are painted  Neuro:             Alert, attentive, speech fluent, face symmetric, moves all extremities, no focal weakness. Ambulatory  Psych:  Calm, cooperative     Extrem: No edema, erythema, phlebitis, cellulitis, warm and well perfused  VAD:  Peripheral.  Right arm AV fistula     Isolation:  Special contact  Wound:       Lines/Tubes/Drains:    General Labs reviewed:  Recent Labs   Lab 03/31/22 2255 04/01/22 0507 04/02/22  0441   WBC 5.28 4.70 3.78*   HGB 11.5* 11.0* 9.7*  9.8*   HCT 36.8* 35.6* 31.0*  31.2*    155 121*       Recent Labs   Lab 03/31/22 2255 04/01/22 0507 04/02/22  0441   * 135* 137   K 5.0 4.2 3.6   CL 93* 93* 97   CO2 25 24 26   BUN 49* 51* 30*   CREATININE 7.3* 7.4* 5.8*   CALCIUM 8.0* 7.7* 8.0*   PROT 8.3  --   --    BILITOT 1.5*  --   --    ALKPHOS 77  --   --    ALT 8*  --    --    AST 17  --   --      No results for input(s): CRP in the last 168 hours.      Micro:  Microbiology Results (last 7 days)     Procedure Component Value Units Date/Time    Blood culture #1 **CANNOT BE ORDERED STAT** [[id number]] Collected: 04/01/22 0114    Order Status: Completed Specimen: Blood from Line, Femoral, Left Updated: 04/02/22 0432     Blood Culture, Routine No Growth to date      No Growth to date    Blood culture #2 **CANNOT BE ORDERED STAT** [[id number]] Collected: 04/01/22 0114    Order Status: Completed Specimen: Blood from Peripheral, Antecubital, Left Updated: 04/02/22 0432     Blood Culture, Routine No Growth to date      No Growth to date    C Diff Toxin by PCR [[id number]]  (Abnormal) Collected: 03/31/22 2354    Order Status: Completed Updated: 04/01/22 0903     C. diff PCR Positive     Comment: POS C. DIFF BY PCR  critical result(s) called and verbal readback   obtained from CAROLE Buckley by CD3 04/01/2022 09:03         Narrative:        POS C. DIFF BY PCR  critical result(s) called and verbal readback   obtained from CAROLE Buckley by CD3 04/01/2022 09:03    Clostridium difficile EIA [[id number]]  (Abnormal) Collected: 03/31/22 2354    Order Status: Completed Specimen: Stool Updated: 04/01/22 0157     C. diff Antigen Positive     C difficile Toxins A+B, EIA Negative     Comment: Testing not recommended for children <24 months old.       Stool culture [[id number]]     Order Status: No result Specimen: Stool           Imaging Reviewed:   CXR the   CTa chest abdomen and pelvis  1: Marked cardiomegaly.  2: Right greater than left pleural effusions. Right pleural effusion remains loculated.  3: Right lung infiltrate which could represent pneumonia.  4: Possible cirrhosis of the liver.  5: Severely atrophic kidneys.  6: Increased abdominal ascites over January 2022.  7: Diverticulosis of the sigmoid colon with thickening suggesting sigmoid diverticulitis without complication.  8:  Extensive arterial vascular disease of the aorta and branch vessels with high-grade bilateral renal artery stenosis, severe stenosis of the superior mesenteric artery and moderate to high-grade stenosis of the celiac artery.       Cardiology:    IMPRESSION & PLAN   1.  C difficile Colitis   I do not believe she has diverticulitis   History of pancreatitis, lipase pending  2.  End-stage renal disease on dialysis  3.  Chronic systolic and diastolic heart failure, chronic pleural effusions, ascites  4.  Chronic hypoxic respiratory failure on home oxygen, continuing to smoke  5.  Bioprosthetic mitral valve in situ  6.  Long-term use of oral anticoagulation, chronically positive stool for occult blood, hemoglobin lower      Recommendations:  Continue oral vancomycin 10  days    oral probiotics for one month minimum     If other services agree, and no further w/u for drop in hgb,  no opposition to discharge.      D/w patient and sisters    Medical Decision Making during this encounter was  [_] Low Complexity  [_] Moderate Complexity  [ xx ] High Complexity

## 2022-04-02 NOTE — CONSULTS
[facility]  Nephrology  Consult Note    Patient Name: Griselda Neely  MRN: [id number]  Admission Date: 3/31/2022  Hospital Length of Stay: 0 days  Attending Provider: Kulwinder Long MD   Primary Care Physician: Denver Cerna NP  Principal Problem:C. difficile colitis    Consults  Subjective:       Patient is a 76-year-old female with past medical history of atrial fibrillation, CHF, COPD, ESRD (MWF with our group in [place]) chronic pancreatitis, s/p TAVR, presents with shortness of breath, abdominal pain, nausea vomiting and diarrhea for 2 days.  Patient states she went to dialysis yesterday and received full treatment.  However she did have diarrhea while she was there.  Her diarrhea is watery and yellow.  No gross blood.  She has diffuse abdominal pain that is worse in the epigastric area.          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    both legs    CHF (congestive heart failure)     Diverticulitis 11/2021    Encounter for blood transfusion 03/2016    Gout     Hemodialysis access, AV graft     Hypertension     Mitral valve regurgitation     Osteoarthritis     Pancreatitis     Peripheral vascular disease     Pneumonia 09/09/2017    Renal failure        Past Surgical History:   Procedure Laterality Date    ACHILLES TENDON SURGERY Right     ANGIOGRAPHY OF ARTERIOVENOUS SHUNT Right 1/7/2020    Procedure: Fistulogram with Possible Intervention;  Surgeon: Joseph Loyola MD;  Location: [facility] CATH/EP LAB;  Service: Cardiology;  Laterality: Right;    ANGIOGRAPHY OF LOWER EXTREMITY Left 3/18/2020    Procedure: Angiogram Extremity Unilateral;  Surgeon: Ali Khoobehi, MD;  Location: [facility] CATH/EP LAB;  Service: Cardiology;  Laterality: Left;    APPENDECTOMY      CARDIAC CATHETERIZATION  07/03/2017    CHOLECYSTECTOMY      COLONOSCOPY Left 10/4/2020    Procedure: COLONOSCOPY;  Surgeon: Jordan Kilpatrick MD;  Location:  [facility] ENDO;  Service: Endoscopy;  Laterality: Left;    ESOPHAGOGASTRODUODENOSCOPY Left 8/17/2020    Procedure: EGD (ESOPHAGOGASTRODUODENOSCOPY);  Surgeon: Talat Trevizo MD;  Location: [facility] ENDO;  Service: Endoscopy;  Laterality: Left;    ESOPHAGOGASTRODUODENOSCOPY Left 10/2/2020    Procedure: EGD (ESOPHAGOGASTRODUODENOSCOPY);  Surgeon: Talat Trevizo MD;  Location: [facility] ENDO;  Service: Endoscopy;  Laterality: Left;    ESOPHAGOGASTRODUODENOSCOPY N/A 6/29/2021    Procedure: EGD (ESOPHAGOGASTRODUODENOSCOPY);  Surgeon: Sudheer Brown III, MD;  Location: [facility] ENDO;  Service: Endoscopy;  Laterality: N/A;    heal surgery Right     HYSTERECTOMY      INSERTION OF STENT INTO PERIPHERAL VESSEL N/A 1/7/2020    Procedure: INSERTION, STENT, VESSEL, PERIPHERAL;  Surgeon: Joseph Loyola MD;  Location: [facility] CATH/EP LAB;  Service: Cardiology;  Laterality: N/A;    MITRAL VALVE REPLACEMENT      PARATHYROIDECTOMY      PARATHYROIDECTOMY  07/13/2017    PERCUTANEOUS TRANSLUMINAL ANGIOPLASTY OF ARTERIOVENOUS FISTULA N/A 1/7/2020    Procedure: PTA, AV FISTULA;  Surgeon: Joseph Loyola MD;  Location: [facility] CATH/EP LAB;  Service: Cardiology;  Laterality: N/A;    PERITONEAL CATHETER INSERTION      RENAL BIOPSY      SMALL BOWEL ENTEROSCOPY N/A 12/2/2020    Procedure: ENTEROSCOPY;  Surgeon: Sudheer Brown III, MD;  Location: [facility] ENDO;  Service: Endoscopy;  Laterality: N/A;    vocal cord nodule      WOUND DEBRIDEMENT Left 7/13/2020    Procedure: DEBRIDEMENT, WOUND;  Surgeon: Carlos Elam III, MD;  Location: [facility] OR;  Service: General;  Laterality: Left;       Review of patient's allergies indicates:  No Known Allergies  Current Facility-Administered Medications   Medication Frequency    acetaminophen tablet 650 mg Q4H PRN    albuterol nebulizer solution 1.25 mg Q6H PRN    aspirin EC tablet 81 mg Daily    calcitRIOL capsule 0.5 mcg Every Mon, Wed, Fri    calcium acetate(phosphat bind) capsule 667 mg Daily     cyanocobalamin tablet 1,000 mcg Daily    diltiaZEM tablet 30 mg QID (AC & HS)    famotidine tablet 20 mg Daily    glycerin-dimeth-urea-w.pet-alc Lotn Daily PRN    isosorbide mononitrate 24 hr tablet 30 mg Daily    LORazepam tablet 0.5 mg Q6H PRN    losartan tablet 25 mg QHS    metoprolol succinate (TOPROL-XL) 24 hr tablet 25 mg Nightly    mupirocin 2 % ointment BID    naloxone 0.4 mg/mL injection 0.02 mg PRN    ondansetron injection 4 mg Q6H PRN    Saccharomyces boulardii capsule 250 mg with lunch    sodium chloride 0.9% flush 10 mL PRN    vancomycin 125 mg/5 mL oral solution 125 mg Q6H     Family History     Problem Relation (Age of Onset)    Arthritis Mother    Diabetes Mother, Father    Early death Sister, Sister    Heart disease Sister, Maternal Grandfather, Mother    Heart failure Mother    Hypertension Mother        Tobacco Use    Smoking status: Current Some Day Smoker     Packs/day: 0.50     Years: 45.00     Pack years: 22.50     Types: Cigarettes    Smokeless tobacco: Never Used   Substance and Sexual Activity    Alcohol use: No    Drug use: No    Sexual activity: Not Currently     Review of Systems   +watery diarrhea  +crampy abd pain, better  +nausea and vomiting, nonbloody yesterday, now resolved  +chronic back pain    -angina, palpitations  -orthopnea  -wheezing, cough  -rectal bleeding  -focal neuro deficits        Objective:     Vital Signs (Most Recent):  Temp: 97.9 °F (36.6 °C) (04/02/22 0851)  Pulse: 81 (04/02/22 0851)  Resp: 17 (04/02/22 0851)  BP: 104/67 (04/02/22 0851)  SpO2: 99 % (04/02/22 0851)  O2 Device (Oxygen Therapy): room air (04/02/22 0851) Vital Signs (24h Range):  Temp:  [97.4 °F (36.3 °C)-98.3 °F (36.8 °C)] 97.9 °F (36.6 °C)  Pulse:  [57-94] 81  Resp:  [16-18] 17  SpO2:  [96 %-100 %] 99 %  BP: ()/(54-68) 104/67     Weight: 58.4 kg (128 lb 12 oz) (04/01/22 0400)  Body mass index is 21.42 kg/m².  Body surface area is 1.64 meters squared.    I/O last 3  completed shifts:  In: 700 [P.O.:200; Other:500]  Out: 2000 [Other:2000]    Physical Exam   AA  NAD  On HD  RRR, no rub  Stable faint B rales. No crackles  Soft but protuberant and diffusely TTP abd, no guarding no rebound        Significant Labs:  BMP:   Recent Labs   Lab 04/02/22  0441   GLU 63*      K 3.6   CL 97   CO2 26   BUN 30*   CREATININE 5.8*   CALCIUM 8.0*   MG 1.8     CBC:   Recent Labs   Lab 04/02/22  0441   WBC 3.78*   RBC 3.30*   HGB 9.7*  9.8*   HCT 31.0*  31.2*   *   MCV 95   MCH 29.7   MCHC 31.4*     CMP:   Recent Labs   Lab 03/31/22  2255 04/01/22  0507 04/02/22 0441   GLU 75   < > 63*   CALCIUM 8.0*   < > 8.0*   ALBUMIN 3.5  --   --    PROT 8.3  --   --    *   < > 137   K 5.0   < > 3.6   CO2 25   < > 26   CL 93*   < > 97   BUN 49*   < > 30*   CREATININE 7.3*   < > 5.8*   ALKPHOS 77  --   --    ALT 8*  --   --    AST 17  --   --    BILITOT 1.5*  --   --     < > = values in this interval not displayed.     Coagulation:   Recent Labs   Lab 03/31/22  2255   INR 1.6     LFTs:   Recent Labs   Lab 03/31/22  2255   ALT 8*   AST 17   ALKPHOS 77   BILITOT 1.5*   PROT 8.3   ALBUMIN 3.5     No results for input(s): COLORU, CLARITYU, SPECGRAV, PHUR, PROTEINUA, GLUCOSEU, BILIRUBINCON, BLOODU, WBCU, RBCU, BACTERIA, MUCUS, NITRITE, LEUKOCYTESUR, UROBILINOGEN, HYALINECASTS in the last 168 hours.    Significant Imaging:  Labs: Reviewed    Assessment/Plan:     Active Diagnoses:    Diagnosis Date Noted POA    PRINCIPAL PROBLEM:  C. difficile colitis [A04.72] 05/31/2016 Unknown    Epigastric pain [R10.13]  Yes    Atrial fibrillation [I48.91] 10/13/2021 Yes     Chronic    Rectal bleeding [K62.5] 11/07/2020 Yes    Diarrhea [R19.7] 05/28/2016 Yes    ESRD (end stage renal disease) on HD M,W, F [N18.6] 05/11/2016 Yes     Chronic      Problems Resolved During this Admission:             A/P:   A/w diarrhea, gastroenteritis and noted to be positive for C.Diff. On HD and tolerating well.     C.  Diff colitis  Management per primary, ID, GI     ESRD  Stable on HD today and will maintain MWF schedule for now     HTN  None on admit; actually mildly hypotensive today  UF as tolerated on HD  Tends to be labile and would continue anti HTN for now     Anemia, renal failure  DONNY TIW with HD     Sec HPT  Increase D/Ca supplf or hypocalcemia  PO4 stable          Thank you for your consult. I will follow-up with patient. Please contact us if you have any additional questions.    Steve Gupta MD  Nephrology  [facility]

## 2022-04-02 NOTE — NURSING
Patient refusing to take most medications. Complaint of painful right upper arm which has her fistula. Had patient roll off her arm for relief. Offered some medication for the pain but she refused this as well. Explained that it is after midnight and it is time for her to rest.. Bed alarm on, patient stable.

## 2022-04-02 NOTE — DISCHARGE INSTRUCTIONS
Follow up with PCP in 1 week.  Follow up with [facility] in 2 weeks, go to the lab to do CBC in 2 weeks prior to appointment.  Keep follow up with Nephrology on scheduled hemodialysis.  Take medicaiotns as prescribed.

## 2022-04-02 NOTE — DISCHARGE SUMMARY
[facility] Medicine  Discharge Summary      Patient Name: Griselda Neely  MRN: [id number]  Patient Class: OP- Observation  Admission Date: 3/31/2022  Hospital Length of Stay: 0 days  Discharge Date and Time:  04/02/2022 3:41 PM  Attending Physician: Dontae Negro MD   Discharging Provider: Dontae Negro MD  Primary Care Provider: Denver Cerna NP      HPI:   No notes on file    * No surgery found *      Hospital Course:   76-year-old female with past medical history of atrial fibrillation, CHF, COPD, ESRD on dialysis, pancreatitis presents with shortness of breath, abdominal pain, nausea vomiting and diarrhea for 2 days due to c.diff, she was started on vancomycin PO and diarrhea progressively resolved, she recevied dialysis while in the hospital, due to her anemia and h.o. Gi bleed in the past with enteroscopy showing two bleeding angiodysplastic lesions in the stomach. Treated with argon plasma coagulation (APC), I consulted GI (Dr Brown) who evaluated the patient, offered endoscopic evaluation but patient refused, he educated her about the risks of refusing it, ok to continue with Coumadin per his assessment despite her refusal of endcoscopies since patient has h.o. intracardiac thrombus, input appreciated. At the time of discharge, patient is stable, doing well, has been cleared for discharge by ID, GI and Nephrology from their standpoint, so she was discharged.        PE  Gen: eldery femal, no distress  Chest : ctab, no w/r/c/r  Heart: rrr, no m/g/r  Abd: soft, nt, nd, bs na  Ext: no pedal edema  Neuro: aao       Goals of Care Treatment Preferences:  Code Status: DNR      Consults:   Consults (From admission, onward)        Status Ordering Provider     Inpatient consult to Gastroenterology  Once        Provider:  Sudheer Brown III, MD    Completed DONTAE NEGRO     Inpatient consult to Nephrology  Once        Provider:  Brenda Morocho MD    Acknowledged  SKYE BLAIR     Inpatient consult to Infectious Diseases  Once        Provider:  Angelica Hobson MD    Completed SKYE BLAIR     Inpatient consult to Hospitalist  Once        Provider:  kSye Blair NP    Acknowledged SCOTTY SOTO          No new Assessment & Plan notes have been filed under this hospital service since the last note was generated.  Service: Hospital Medicine    Final Active Diagnoses:    Diagnosis Date Noted POA    PRINCIPAL PROBLEM:  C. difficile colitis [A04.72] 05/31/2016 Unknown    Epigastric pain [R10.13]  Yes    Atrial fibrillation [I48.91] 10/13/2021 Yes     Chronic    Rectal bleeding [K62.5] 11/07/2020 Yes    Diarrhea [R19.7] 05/28/2016 Yes    ESRD (end stage renal disease) on HD M,W, F [N18.6] 05/11/2016 Yes     Chronic      Problems Resolved During this Admission:       Discharged Condition: good    Disposition: Home or Self Care    Follow Up:   Follow-up Information     Denver Cerna NP Follow up in 1 week(s).    Specialty: Family Medicine  Contact information:  [address] [facility] [address]  [phone]             Sudheer Brown III, MD Follow up in 2 week(s).    Specialty: Gastroenterology  Contact information:  [address] [facility] [address]  [phone]                       Patient Instructions:      CBC auto differential   Standing Status: Future Standing Exp. Date: 06/01/23       Significant Diagnostic Studies: Labs:   BMP:   Recent Labs   Lab 03/31/22 2255 04/01/22 0507 04/02/22  0441   GLU 75 70 63*   * 135* 137   K 5.0 4.2 3.6   CL 93* 93* 97   CO2 25 24 26   BUN 49* 51* 30*   CREATININE 7.3* 7.4* 5.8*   CALCIUM 8.0* 7.7* 8.0*   MG  --  1.6 1.8    and CBC   Recent Labs   Lab 03/31/22 2255 04/01/22 0507 04/02/22  0441 04/02/22  1457   WBC 5.28 4.70 3.78*  --    HGB 11.5* 11.0* 9.7*  9.8* 10.4*   HCT 36.8* 35.6* 31.0*  31.2* 33.9*    155 121*  --        Pending Diagnostic Studies:     None          Medications:  Reconciled Home Medications:      Medication List      START taking these medications    cyanocobalamin 1000 MCG tablet  Commonly known as: VITAMIN B-12  Take 1 tablet (1,000 mcg total) by mouth once daily.  Start taking on: April 3, 2022     Saccharomyces boulardii 250 mg capsule  Commonly known as: FLORASTOR  Take 1 capsule (250 mg total) by mouth with lunch.  Start taking on: April 3, 2022     vancomycin 125 mg/5 mL Soln  Take 5 mLs (125 mg total) by mouth every 6 (six) hours. for 10 days        CONTINUE taking these medications    * albuterol 90 mcg/actuation inhaler  Commonly known as: PROVENTIL/VENTOLIN HFA  Inhale 1 puff into the lungs every 4 (four) hours as needed for Wheezing. rescue     * albuterol 1.25 mg/3 mL Nebu  Commonly known as: ACCUNEB  Take 1.25 mg by nebulization every 6 (six) hours as needed. Rescue  HCS     aspirin 81 MG EC tablet  Commonly known as: ECOTRIN  Take 81 mg by mouth once daily.     calcitRIOL 0.5 MCG Cap  Commonly known as: ROCALTROL  Take 1 capsule (0.5 mcg total) by mouth every Mon, Wed, Fri.     calcium acetate(phosphat bind) 667 mg capsule  Commonly known as: PHOSLO  Take 667 mg by mouth once daily.     diltiaZEM 30 MG tablet  Commonly known as: CARDIZEM  Take 1 tablet (30 mg total) by mouth 4 (four) times daily before meals and nightly.     isosorbide mononitrate 30 MG 24 hr tablet  Commonly known as: IMDUR  Take 1 tablet (30 mg total) by mouth once daily.     LORazepam 0.5 MG tablet  Commonly known as: ATIVAN  Take 1 tablet (0.5 mg total) by mouth every 6 (six) hours as needed for Anxiety.     losartan 25 MG tablet  Commonly known as: COZAAR  Take 1 tablet (25 mg total) by mouth every evening.     metoprolol succinate 25 MG 24 hr tablet  Commonly known as: TOPROL-XL  Take 1 tablet by mouth nightly     ondansetron 4 MG tablet  Commonly known as: ZOFRAN  Take 1 tablet (4 mg total) by mouth every 6 (six) hours as needed for Nausea.     oxyCODONE-acetaminophen  5-325 mg per tablet  Commonly known as: PERCOCET  Take 1 tablet by mouth every 4 (four) hours as needed for Pain.     pantoprazole 40 MG tablet  Commonly known as: PROTONIX  Take 1 tablet by mouth once daily     warfarin 5 MG tablet  Commonly known as: COUMADIN  Take 1 tablet (5 mg total) by mouth Daily.         * This list has 2 medication(s) that are the same as other medications prescribed for you. Read the directions carefully, and ask your doctor or other care provider to review them with you.            STOP taking these medications    hydrALAZINE 50 MG tablet  Commonly known as: APRESOLINE            Indwelling Lines/Drains at time of discharge:   Lines/Drains/Airways     Drain  Duration                Hemodialysis AV Fistula 08/08/19 0214 Right upper arm 968 days                Time spent on the discharge of patient: 40 minutes         Kulwinder Long MD  Department of Hospital Medicine  [facility]

## 2022-04-02 NOTE — CARE UPDATE
Orders reviewed. CONNER reviewed [facility] note and sent [facility] discharge orders. Patient is resumption of care in observation status. Waiting on [facility] to reach out with start of service date and to inform if there is anything else needed.

## 2022-04-02 NOTE — HOSPITAL COURSE
76-year-old female with past medical history of atrial fibrillation, CHF, COPD, ESRD on dialysis, pancreatitis presents with shortness of breath, abdominal pain, nausea vomiting and diarrhea for 2 days due to c.diff, she was started on vancomycin PO and diarrhea progressively resolved, she recevied dialysis while in the hospital, due to her anemia and h.o. Gi bleed in the past with enteroscopy showing two bleeding angiodysplastic lesions in the stomach. Treated with argon plasma coagulation (APC), I consulted GI (Dr Brown) who evaluated the patient, offered endoscopic evaluation but patient refused, he educated her about the risks of refusing it, ok to continue with Coumadin per his assessment despite her refusal of endcoscopies since patient has h.o. intracardiac thrombus, input appreciated. At the time of discharge, patient is stable, doing well, has been cleared for discharge by ID, GI and Nephrology from their standpoint, so she was discharged.        PE  Gen: eldery femal, no distress  Chest : ctab, no w/r/c/r  Heart: rrr, no m/g/r  Abd: soft, nt, nd, bs na  Ext: no pedal edema  Neuro: aao

## 2022-04-02 NOTE — PLAN OF CARE
Problem: Adult Inpatient Plan of Care  Goal: Plan of Care Review  Outcome: Ongoing, Progressing  Goal: Patient-Specific Goal (Individualized)  Outcome: Ongoing, Progressing  Goal: Absence of Hospital-Acquired Illness or Injury  Outcome: Ongoing, Progressing  Goal: Optimal Comfort and Wellbeing  Outcome: Ongoing, Progressing  Goal: Readiness for Transition of Care  Outcome: Ongoing, Progressing     Problem: Infection  Goal: Absence of Infection Signs and Symptoms  Outcome: Ongoing, Progressing     Problem: Skin Injury Risk Increased  Goal: Skin Health and Integrity  Outcome: Ongoing, Progressing     Problem: Device-Related Complication Risk (Hemodialysis)  Goal: Safe, Effective Therapy Delivery  Outcome: Ongoing, Progressing     Problem: Hemodynamic Instability (Hemodialysis)  Goal: Effective Tissue Perfusion  Outcome: Ongoing, Progressing     Problem: Infection (Hemodialysis)  Goal: Absence of Infection Signs and Symptoms  Outcome: Ongoing, Progressing

## 2022-04-04 NOTE — TELEPHONE ENCOUNTER
----- Message from Tito Montgomery sent at 4/4/2022 11:36 AM CDT -----  Contact: sister/Aide  Type: Needs Medical Advice  Who Called:  sister/Aide  Symptoms (please be specific):  hospital f/u  How long has patient had these symptoms:  3/31/22  Pharmacy name and phone #:    [id number] - [place], [place] - [address] [address]  Phone: [phone] Fax: [phone]      Best Call Back Number: [phone]  Additional Information: Aide called in and stated patient was admitted to [facility] on 3/31/22 for some type of infection.  Aide stated patient was discharged 4/2/22.  Aide stated the antibiotic listed below was called in for patient but [facility] will not cover it and it cost of $900.  Aide wanted to see if an alternative could be called in as patient has not had any medication since she has been home.    vancomycin 125 mg/5 mL Soln  0 4/2/2022 4/12/2022 No  Sig - Route: Take 5 mLs (125 mg total) by mouth every 6 (six) hours. for 10 days - Oral    Hospital follow up appointment scheduled for 4/13/22.

## 2022-04-04 NOTE — PLAN OF CARE
Patient [facility] and will be seen 4/4/2022(GERALD Bel [phone])   04/04/22 1011   Final Note   Assessment Type Final Discharge Note   Anticipated Discharge Disposition Home-Health   What phone number can be called within the next 1-3 days to see how you are doing after discharge? [phone]   Post-Acute Status   Discharge Delays None known at this time

## 2022-04-08 PROBLEM — A04.72 CLOSTRIDIUM DIFFICILE COLITIS: Status: ACTIVE | Noted: 2022-01-01

## 2022-04-08 NOTE — ASSESSMENT & PLAN NOTE
Clinically diarrhea has gotten better.  Nausea and vomiting has resolved.  Continue on vancomycin orally as well as a continue on arm pantoprazole

## 2022-04-08 NOTE — ASSESSMENT & PLAN NOTE
Blood pressure is relatively stable at this time continue on present therapy including metoprolol losartan and diltiazem

## 2022-04-08 NOTE — ASSESSMENT & PLAN NOTE
Continue on Coumadin for mechanical valve are.  And continue on present therapy and appears to be rate controlled and regular.

## 2022-04-08 NOTE — PROGRESS NOTES
Subjective:    Patient ID:  Griselda Neely is a 76 y.o. female patient here for evaluation Atrial Fibrillation, Congestive Heart Failure, and Hypertension      History of Present Illness:       Patient is here for follow-up evaluation.  Recently she was admitted to the hospital on 04/02/2020 abdominal discomfort and nausea vomiting and diarrhea.  She was thought to have C diff infection currently on vancomycin.  She is also receiving dialysis.  No new cardiac symptoms are noted.   apparently she is having some dialysis access issues and is scheduled for revision on Monday.       Patient Name: Griselda Neely  MRN: [id number]  Patient Class: OP- Observation  Admission Date: 3/31/2022  Hospital Length of Stay: 0 days  Discharge Date and Time:  04/02/2022 3:41 PM  Attending Physician: Kulwinder Long MD   Discharging Provider: Kulwinder Long MD  Primary Care Provider: Denver Cerna NP        HPI:   No notes on file     * No surgery found *       Hospital Course:   76-year-old female with past medical history of atrial fibrillation, CHF, COPD, ESRD on dialysis, pancreatitis presents with shortness of breath, abdominal pain, nausea vomiting and diarrhea for 2 days due to c.diff, she was started on vancomycin PO and diarrhea progressively resolved, she recevied dialysis while in the hospital, due to her anemia and h.o. Gi bleed in the past with enteroscopy showing two bleeding angiodysplastic lesions in the stomach. Treated with argon plasma coagulation (APC), I consulted GI (Dr Brown) who evaluated the patient, offered endoscopic evaluation but patient refused, he educated her about the risks of refusing it, ok to continue with Coumadin per his assessment despite her refusal of endcoscopies since patient has h.o. intracardiac thrombus, input appreciated. At the time of discharge, patient is stable, doing well, has been cleared for discharge by ID, GI and Nephrology from their standpoint, so she was  discharged.           PE  Gen: eldery femal, no distress  Chest : ctab, no w/r/c/r  Heart: rrr, no m/g/r  Abd: soft, nt, nd, bs na  Ext: no pedal edema  Neuro: aao        Goals of Care Treatment Preferences:  Code Status: DNR        Consults:           Consults (From admission, onward)         Status Ordering Provider       Inpatient consult to Gastroenterology  Once        Provider:  Sudheer Brown III, MD    Completed DONTAE NEGRO       Inpatient consult to Nephrology  Once        Provider:  Brenda Morocho MD    Acknowledged SKYE BLAIR       Inpatient consult to Infectious Diseases  Once        Provider:  Angelica Hobson MD    Completed SKYE BLAIR       Inpatient consult to Hospitalist  Once        Provider:  Skye Blair NP    Acknowledged SCOTTY SOTO             No new Assessment & Plan notes have been filed under this hospital service since the last note was generated.  Service: Hospital Medicine         Review of patient's allergies indicates:  No Known Allergies    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    both legs    CHF (congestive heart failure)     Diverticulitis 11/2021    Encounter for blood transfusion 03/2016    Gout     Hemodialysis access, AV graft     Hypertension     Mitral valve regurgitation     Osteoarthritis     Pancreatitis     Peripheral vascular disease     Pneumonia 09/09/2017    Renal failure      Past Surgical History:   Procedure Laterality Date    ACHILLES TENDON SURGERY Right     ANGIOGRAPHY OF ARTERIOVENOUS SHUNT Right 1/7/2020    Procedure: Fistulogram with Possible Intervention;  Surgeon: Joseph Loyola MD;  Location: [facility] CATH/EP LAB;  Service: Cardiology;  Laterality: Right;    ANGIOGRAPHY OF LOWER EXTREMITY Left 3/18/2020    Procedure: Angiogram Extremity Unilateral;  Surgeon: Ali Khoobehi, MD;  Location: [facility] CATH/EP LAB;  Service: Cardiology;  Laterality:  Left;    APPENDECTOMY      CARDIAC CATHETERIZATION  07/03/2017    CHOLECYSTECTOMY      COLONOSCOPY Left 10/4/2020    Procedure: COLONOSCOPY;  Surgeon: Jordan Kilpatrick MD;  Location: [facility] ENDO;  Service: Endoscopy;  Laterality: Left;    ESOPHAGOGASTRODUODENOSCOPY Left 8/17/2020    Procedure: EGD (ESOPHAGOGASTRODUODENOSCOPY);  Surgeon: Talat Trevizo MD;  Location: [facility] ENDO;  Service: Endoscopy;  Laterality: Left;    ESOPHAGOGASTRODUODENOSCOPY Left 10/2/2020    Procedure: EGD (ESOPHAGOGASTRODUODENOSCOPY);  Surgeon: Talat Trevizo MD;  Location: [facility] ENDO;  Service: Endoscopy;  Laterality: Left;    ESOPHAGOGASTRODUODENOSCOPY N/A 6/29/2021    Procedure: EGD (ESOPHAGOGASTRODUODENOSCOPY);  Surgeon: Sudheer Brown III, MD;  Location: [facility] ENDO;  Service: Endoscopy;  Laterality: N/A;    heal surgery Right     HYSTERECTOMY      INSERTION OF STENT INTO PERIPHERAL VESSEL N/A 1/7/2020    Procedure: INSERTION, STENT, VESSEL, PERIPHERAL;  Surgeon: Joseph Loyola MD;  Location: [facility] CATH/EP LAB;  Service: Cardiology;  Laterality: N/A;    MITRAL VALVE REPLACEMENT      PARATHYROIDECTOMY      PARATHYROIDECTOMY  07/13/2017    PERCUTANEOUS TRANSLUMINAL ANGIOPLASTY OF ARTERIOVENOUS FISTULA N/A 1/7/2020    Procedure: PTA, AV FISTULA;  Surgeon: Joseph Loyola MD;  Location: [facility] CATH/EP LAB;  Service: Cardiology;  Laterality: N/A;    PERITONEAL CATHETER INSERTION      RENAL BIOPSY      SMALL BOWEL ENTEROSCOPY N/A 12/2/2020    Procedure: ENTEROSCOPY;  Surgeon: Sudheer Brown III, MD;  Location: [facility] ENDO;  Service: Endoscopy;  Laterality: N/A;    vocal cord nodule      WOUND DEBRIDEMENT Left 7/13/2020    Procedure: DEBRIDEMENT, WOUND;  Surgeon: Carlos Elam III, MD;  Location: [facility] OR;  Service: General;  Laterality: Left;     Social History     Tobacco Use    Smoking status: Current Some Day Smoker     Packs/day: 0.50     Years: 45.00     Pack years: 22.50     Types: Cigarettes    Smokeless  tobacco: Never Used   Substance Use Topics    Alcohol use: No    Drug use: No        Review of Systems:    As noted in HPI in addition      REVIEW OF SYSTEMS  CARDIOVASCULAR: No recent chest pain, palpitations, arm, neck, or jaw pain  RESPIRATORY: No recent fever, cough chills, SOB or congestion  : No blood in the urine  GI: No Nausea, vomiting, constipation, diarrhea, blood, or reflux.  MUSCULOSKELETAL: No myalgias  NEURO: No lightheadedness or dizziness  EYES: No Double vision, blurry, vision or headache              Objective        Vitals:    04/08/22 1333   BP: 122/70   Pulse: 74       LIPIDS - LAST 2   Lab Results   Component Value Date    CHOL 157 10/13/2021    CHOL 155 10/19/2019    HDL 66 10/13/2021    HDL 56 10/19/2019    LDLCALC 76.4 10/13/2021    LDLCALC 82.6 10/19/2019    TRIG 73 10/13/2021    TRIG 82 10/19/2019    CHOLHDL 42.0 10/13/2021    CHOLHDL 36.1 10/19/2019       CBC - LAST 2  Lab Results   Component Value Date    WBC 3.78 (L) 04/02/2022    WBC 4.70 04/01/2022    RBC 3.30 (L) 04/02/2022    RBC 3.76 (L) 04/01/2022    HGB 10.4 (L) 04/02/2022    HGB 9.7 (L) 04/02/2022    HGB 9.8 (L) 04/02/2022    HCT 33.9 (L) 04/02/2022    HCT 31.0 (L) 04/02/2022    HCT 31.2 (L) 04/02/2022    MCV 95 04/02/2022    MCV 95 04/01/2022    MCH 29.7 04/02/2022    MCH 29.3 04/01/2022    MCHC 31.4 (L) 04/02/2022    MCHC 30.9 (L) 04/01/2022    RDW 18.5 (H) 04/02/2022    RDW 18.6 (H) 04/01/2022     (L) 04/02/2022     04/01/2022    MPV 11.0 04/02/2022    MPV 11.0 04/01/2022    GRAN 2.7 04/02/2022    GRAN 70.0 04/02/2022    LYMPH 0.5 (L) 04/02/2022    LYMPH 12.2 (L) 04/02/2022    MONO 0.6 04/02/2022    MONO 14.6 04/02/2022    BASO 0.02 04/02/2022    BASO 0.02 04/01/2022    NRBC 1 (A) 04/02/2022    NRBC 0 04/01/2022       CHEMISTRY & LIVER FUNCTION - LAST 2  Lab Results   Component Value Date     04/02/2022     (L) 04/01/2022    K 3.6 04/02/2022    K 4.2 04/01/2022    CL 97 04/02/2022    CL 93 (L)  04/01/2022    CO2 26 04/02/2022    CO2 24 04/01/2022    ANIONGAP 14 04/02/2022    ANIONGAP 18 (H) 04/01/2022    BUN 30 (H) 04/02/2022    BUN 51 (H) 04/01/2022    CREATININE 5.8 (H) 04/02/2022    CREATININE 7.4 (H) 04/01/2022    GLU 63 (L) 04/02/2022    GLU 70 04/01/2022    CALCIUM 8.0 (L) 04/02/2022    CALCIUM 7.7 (L) 04/01/2022    PH 7.419 01/23/2022    PH 7.398 09/21/2020    MG 1.8 04/02/2022    MG 1.6 04/01/2022    ALBUMIN 3.5 03/31/2022    ALBUMIN 3.3 (L) 03/04/2022    PROT 8.3 03/31/2022    PROT 7.6 03/04/2022    ALKPHOS 77 03/31/2022    ALKPHOS 78 03/04/2022    ALT 8 (L) 03/31/2022    ALT 11 03/04/2022    AST 17 03/31/2022    AST 15 03/04/2022    BILITOT 1.5 (H) 03/31/2022    BILITOT 0.7 03/04/2022        CARDIAC PROFILE - LAST 2  Lab Results   Component Value Date    BNP >4,500 (H) 03/31/2022    BNP >4,500 (H) 03/02/2022    CPK 50 03/02/2022    CPK 34 05/09/2020    CPKMB 1.4 05/09/2020    CPKMB 2.8 10/19/2019     03/02/2022     11/15/2020    TROPONINI 0.042 (H) 04/02/2022    TROPONINI 0.042 (H) 04/01/2022        COAGULATION - LAST 2  Lab Results   Component Value Date    LABPT 17.7 (H) 03/31/2022    LABPT 18.4 (H) 03/04/2022    INR 1.2 04/04/2022    INR 1.6 03/31/2022    APTT 36.5 (H) 01/24/2022    APTT 27.0 06/28/2021       ENDOCRINE & PSA - LAST 2  Lab Results   Component Value Date    HGBA1C SEE COMMENT 12/12/2020    HGBA1C SEE COMMENT 11/07/2020    TSH 3.610 01/23/2022    TSH 3.210 09/21/2020    PROCAL 0.18 03/02/2022    PROCAL 0.33 11/20/2021        ECHOCARDIOGRAM RESULTS  Results for orders placed during the hospital encounter of 06/28/21    Echo    Interpretation Summary  · The left ventricle is normal in size with mild concentric hypertrophy and severely decreased systolic function.  · The estimated ejection fraction is 25%.  · Left ventricular diastolic dysfunction.  · Mild right ventricular enlargement with mildly reduced right ventricular systolic function.  · Moderate left atrial  enlargement.  · Mild right atrial enlargement.  · Mild to moderate tricuspid regurgitation.  · Mild to moderate pulmonic regurgitation.  · There is a bioprosthetic mitral valve. There is mild insufficiency present. Prosthetic mitral valve is normal.      CURRENT/PREVIOUS VISIT EKG  Results for orders placed or performed during the hospital encounter of 03/31/22   EKG 12-lead    Collection Time: 03/31/22 10:49 PM    Narrative    Test Reason :     Vent. Rate : 117 BPM     Atrial Rate : 000 BPM     P-R Int : 000 ms          QRS Dur : 092 ms      QT Int : 340 ms       P-R-T Axes : 000 -42 166 degrees     QTc Int : 474 ms    Atrial fibrillation with rapid ventricular response  Left axis deviation  ST and T wave abnormality, consider lateral ischemia  Abnormal ECG  No previous ECGs available  Confirmed by Jaime MENDOZA, Rodríguez GAMING ([id number]) on 4/7/2022 8:54:37 PM    Referred By: MURTAZA   SELF           Confirmed By:Rodríguez Sandoval MD     No valid procedures specified.   No results found for this or any previous visit.    No valid procedures specified.    PHYSICAL EXAM  CONSTITUTIONAL: Well built, well nourished in no apparent distress  NECK: no carotid bruit, no JVD  LUNGS: CTA  CHEST WALL: no tenderness  HEART: regular rate and rhythm, S1, S2 normal, no murmur, click, rub or gallop   ABDOMEN: soft, non-tender; bowel sounds normal; no masses,  no organomegaly  EXTREMITIES: Extremities normal, no edema, no calf tenderness noted  NEURO: AAO X 3    I HAVE REVIEWED :    The vital signs, nurses notes, and all the pertinent radiology and labs.        Current Outpatient Medications   Medication Instructions    albuterol (ACCUNEB) 1.25 mg, Nebulization, Every 6 hours PRN, RescueHCS    albuterol (PROVENTIL/VENTOLIN HFA) 90 mcg/actuation inhaler 1 puff, Inhalation, Every 4 hours PRN, rescue    aspirin (ECOTRIN) 81 mg, Oral, Daily    cyanocobalamin (VITAMIN B-12) 1,000 mcg, Oral, Daily    diltiaZEM (CARDIZEM) 30 mg, Oral, Before  meals & nightly    isosorbide mononitrate (IMDUR) 30 mg, Oral, Daily    LORazepam (ATIVAN) 0.5 mg, Oral, Every 6 hours PRN    losartan (COZAAR) 25 mg, Oral, Nightly    metoprolol succinate (TOPROL-XL) 25 MG 24 hr tablet Take 1 tablet by mouth nightly    montelukast (SINGULAIR) 10 mg, Oral, Nightly    ondansetron (ZOFRAN) 4 mg, Oral, Every 6 hours PRN    oxyCODONE-acetaminophen (PERCOCET) 5-325 mg per tablet 1 tablet, Oral, Every 4 hours PRN    pantoprazole (PROTONIX) 40 MG tablet Take 1 tablet by mouth once daily    Saccharomyces boulardii (FLORASTOR) 250 mg, Oral, with lunch    vancomycin (VANCOCIN) 125 mg, Oral, Every 6 hours    warfarin (COUMADIN) 5 mg, Oral, Daily          Assessment & Plan     HTN (hypertension)  Blood pressure is relatively stable at this time continue on present therapy including metoprolol losartan and diltiazem    HLD (hyperlipidemia)  Continue on conservative treatment.    Chronic systolic heart failure  Fairly well compensated at this time on hemodialysis plus are she is receiving isosorbide mononitrate 30 mg daily Toprol-XL 25 mg a day and losartan 25 mg at nighttime.    H/O mitral valve replacement  Clinically stable.  Continue on Coumadin    Atrial fibrillation  Continue on Coumadin for mechanical valve are.  And continue on present therapy and appears to be rate controlled and regular.    Clostridium difficile colitis  Clinically diarrhea has gotten better.  Nausea and vomiting has resolved.  Continue on vancomycin orally as well as a continue on arm pantoprazole          No follow-ups on file.

## 2022-04-08 NOTE — ASSESSMENT & PLAN NOTE
Fairly well compensated at this time on hemodialysis plus are she is receiving isosorbide mononitrate 30 mg daily Toprol-XL 25 mg a day and losartan 25 mg at nighttime.

## 2022-04-11 PROBLEM — T82.590A DIALYSIS AV FISTULA MALFUNCTION: Status: ACTIVE | Noted: 2019-10-24

## 2022-04-11 NOTE — Clinical Note
15 ml of contrast were injected throughout the case. 35 mL of contrast was the total wasted during the case. 50 mL was the total amount used during the case.

## 2022-04-11 NOTE — OP NOTE
[facility]  Surgery Department  Operative Note    SUMMARY     Date of Procedure: 4/11/2022     Procedure: Procedure(s) (LRB):  FISTULOGRAM (Bilateral)     Surgeon(s) and Role:     * Joseph Loyola MD - Primary    Assisting Surgeon: None    Pre-Operative Diagnosis: Malfunction of arteriovenous dialysis fistula, initial encounter [T82.590A]    Post-Operative Diagnosis: Post-Op Diagnosis Codes:     * Malfunction of arteriovenous dialysis fistula, initial encounter [T82.590A]    Anesthesia: RN IV Sedation    Operative Findings (including complications, if any):  Patent right upper arm basilic vein transposition AV fistula    Description of Technical Procedures:  Right upper extremity AV fistulogram with physician directed nurse administered conscious sedation.    Right AV fistula accessed in retrograde fashion 5 [place] sheath placed fistulogram performed up the arm and with compression to get reflux across the arterial anastomosis.  Findings are widely patent arterial venous anastomosis distal sub basilic vein is widely patent and dilated there is a widely patent Via Holguin stent in the upper basilic vein and the axillary and subclavian veins are all patent there is no central vein stenosis.  Sheath was pulled and stitch closed.    Significant Surgical Tasks Conducted by the Assistant(s), if Applicable:     Estimated Blood Loss (EBL): * No values recorded between 4/11/2022 10:55 AM and 4/11/2022 11:04 AM *           Implants: * No implants in log *    Specimens:   Specimen (24h ago, onward)            None                  Condition: Good    Disposition: PACU - hemodynamically stable.    Attestation: I was present and scrubbed for the entire procedure.

## 2022-04-11 NOTE — Clinical Note
The site was marked. The right brachial was prepped. The site was prepped with ChloraPrep. The site was clipped. The patient was draped. The patient was positioned supine. The patient was secured using safety straps.

## 2022-04-12 NOTE — PROGRESS NOTES
4/12/22  Elvi with [facility] called regarding INR lab draw due 4/13/22, Patient needs to be seen for re cert today and asked if INR could be drawn today, date changed, order was faxed to [facility]

## 2022-04-13 NOTE — PROGRESS NOTES
Subjective:       Patient ID: Griselda Neely is a 76 y.o. female.    Chief Complaint: Hospital Follow Up    HPI    Patient presents today with son for hospital visit follow up. Patient was seen in ER on  4/11/22- for AV malfunction-HD MWF. She also was in ER on 3/31/22  for diverticulitis/C.diff PO vancomycin    4/8/22 Cardiology Dr.Vasantb Asif for aFib, CHF  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    both legs    CHF (congestive heart failure)     Diverticulitis 11/2021    Encounter for blood transfusion 03/2016    Gout     Hemodialysis access, AV graft     Hypertension     Mitral valve regurgitation     Osteoarthritis     Pancreatitis     Peripheral vascular disease     Pneumonia 09/09/2017    Renal failure        Review of patient's allergies indicates:  No Known Allergies      Current Outpatient Medications:     albuterol (ACCUNEB) 1.25 mg/3 mL Nebu, Take 1.25 mg by nebulization every 6 (six) hours as needed. Rescue HCS, Disp: , Rfl:     albuterol (PROVENTIL/VENTOLIN HFA) 90 mcg/actuation inhaler, Inhale 1 puff into the lungs every 4 (four) hours as needed for Wheezing. rescue, Disp: , Rfl:     aspirin (ECOTRIN) 81 MG EC tablet, Take 81 mg by mouth once daily., Disp: , Rfl:     cyanocobalamin (VITAMIN B-12) 1000 MCG tablet, Take 1 tablet (1,000 mcg total) by mouth once daily., Disp: 30 tablet, Rfl: 0    diltiaZEM (CARDIZEM) 30 MG tablet, Take 1 tablet (30 mg total) by mouth 4 (four) times daily before meals and nightly., Disp: 120 tablet, Rfl: 11    isosorbide mononitrate (IMDUR) 30 MG 24 hr tablet, Take 1 tablet (30 mg total) by mouth once daily., Disp: 90 tablet, Rfl: 3    LORazepam (ATIVAN) 0.5 MG tablet, Take 1 tablet (0.5 mg total) by mouth every 6 (six) hours as needed for Anxiety., Disp: 30 tablet, Rfl: 0    losartan (COZAAR) 25 MG tablet, Take 1 tablet (25 mg total) by mouth every evening., Disp: 90 tablet, Rfl: 1     metoprolol succinate (TOPROL-XL) 25 MG 24 hr tablet, Take 1 tablet by mouth nightly, Disp: 90 tablet, Rfl: 0    montelukast (SINGULAIR) 10 mg tablet, Take 10 mg by mouth every evening., Disp: , Rfl:     ondansetron (ZOFRAN) 4 MG tablet, Take 1 tablet (4 mg total) by mouth every 6 (six) hours as needed for Nausea., Disp: 12 tablet, Rfl: 0    Saccharomyces boulardii (FLORASTOR) 250 mg capsule, Take 1 capsule (250 mg total) by mouth with lunch., Disp: 30 capsule, Rfl: 0    sodium chloride 0.9% SolP 100 mL with iron sucrose 100 mg iron/5 mL Soln 100 mg, 50 mg., Disp: , Rfl:     warfarin (COUMADIN) 2.5 MG tablet, 1-2 tablet AS DIRECTED (route: oral), Disp: , Rfl:     warfarin (COUMADIN) 5 MG tablet, Take 1 tablet (5 mg total) by mouth Daily., Disp: 30 tablet, Rfl: 0    mupirocin (BACTROBAN) 2 % ointment, Apply topically 3 (three) times daily. for 14 days, Disp: 2 g, Rfl: 4    oxyCODONE-acetaminophen (PERCOCET) 5-325 mg per tablet, Take 1 tablet by mouth every 4 (four) hours as needed for Pain. (Patient not taking: Reported on 4/13/2022), Disp: 10 tablet, Rfl: 0    pantoprazole (PROTONIX) 40 MG tablet, Take 1 tablet by mouth once daily, Disp: 90 tablet, Rfl: 0    Review of Systems   Constitutional: Negative for unexpected weight change.   HENT: Negative for trouble swallowing.    Eyes: Negative for visual disturbance.   Respiratory: Negative for shortness of breath.    Cardiovascular: Negative for chest pain, palpitations and leg swelling.   Gastrointestinal: Negative for blood in stool.   Genitourinary: Negative for hematuria.   Skin: Negative for rash.   Allergic/Immunologic: Negative for immunocompromised state.   Neurological: Negative for headaches.   Hematological: Does not bruise/bleed easily.   Psychiatric/Behavioral: Negative for agitation. The patient is not nervous/anxious.        Objective:      /64 (BP Location: Left arm, Patient Position: Sitting, BP Method: Small (Manual))   Pulse 89   Ht  "5' 5" (1.651 m)   Wt 58.7 kg (129 lb 6.6 oz)   LMP  (LMP Unknown)   SpO2 98%   BMI 21.53 kg/m²   Physical Exam  Constitutional:       Appearance: She is well-developed.   Eyes:      Pupils: Pupils are equal, round, and reactive to light.   Cardiovascular:      Rate and Rhythm: Normal rate and regular rhythm.      Heart sounds: Normal heart sounds.   Pulmonary:      Effort: Pulmonary effort is normal.      Breath sounds: Normal breath sounds.      Comments: Continuous oxygen  Abdominal:      General: Bowel sounds are normal.      Palpations: Abdomen is soft.   Musculoskeletal:         General: Normal range of motion.      Cervical back: Normal range of motion.      Comments: In wheelchair   Skin:     General: Skin is warm and dry.   Neurological:      Mental Status: She is alert and oriented to person, place, and time.   Psychiatric:         Behavior: Behavior normal.         Thought Content: Thought content normal.         Judgment: Judgment normal.         Assessment:       1. Colitis, Clostridium difficile    2. Chronic hypoxemic respiratory failure    3. Chronic obstructive pulmonary disease, unspecified COPD type    4. Essential hypertension    5. Rash    6. ESRD (end stage renal disease) on dialysis    7. Longstanding persistent atrial fibrillation    8. BMI 21.0-21.9, adult        Plan:       Colitis, Clostridium difficile  Stable, patient on vancomycin  Diarrhea has improved  Hydrate with clear Pedialyte  Chronic hypoxemic respiratory failure  Stable, pulse ox 98% on 3 liters  Chronic obstructive pulmonary disease, unspecified COPD type  Stable, continue resp inhaler  Essential hypertension  Stable reading today, continue management  Low sodium diet  BP Readings from Last 3 Encounters:   04/13/22 132/64   04/11/22 (!) 159/97   04/08/22 122/70     Rash  -     mupirocin (BACTROBAN) 2 % ointment; Apply topically 3 (three) times daily. for 14 days  Dispense: 2 g; Refill: 4    ESRD (end stage renal disease) on " dialysis  Stable, continue MWF follow up    Longstanding persistent atrial fibrillation  Stable, on warfarin  Followed by cardiology    BMI 21.0-21.9, adult  Stable, continue management        Patient readiness: acceptance and barriers:none    During the course of the visit the patient was educated and counseled about the following:     Hypertension:   Dietary sodium restriction.  Regular aerobic exercise.    Goals: Hypertension: Reduce Blood Pressure    Did patient meet goals/outcomes: Yes    The following self management tools provided: declined    Patient Instructions (the written plan) was given to the patient/family.     Time spent with patient: 15 minutes    Barriers to medications present (no )    Adverse reactions to current medications (no)    Over the counter medications reviewed (Yes)

## 2022-04-13 NOTE — PATIENT INSTRUCTIONS
Jonathan Villalobos,     If you are due for any health screening(s) below please notify me so we can arrange them to be ordered and scheduled to maintain your health. Most healthy patients complete it. Don't lose out on improving your health.     Health Maintenance   Topic Date Due    Hemoglobin A1c  06/12/2021    DEXA Scan  04/13/2022 (Originally 9/1/1986)    TETANUS VACCINE  04/13/2023 (Originally 9/1/1963)    LDCT Lung Screen  04/13/2023 (Originally 10/24/2020)    Lipid Panel  10/13/2026    Hepatitis C Screening  Completed

## 2022-04-26 PROBLEM — K52.9 COLITIS: Status: ACTIVE | Noted: 2022-01-01

## 2022-04-26 PROBLEM — K86.1 CHRONIC PANCREATITIS: Status: ACTIVE | Noted: 2022-01-01

## 2022-04-26 NOTE — CARE UPDATE
04/26/22 1200   Patient Assessment/Suction   Level of Consciousness (AVPU) alert   Respiratory Effort Mild   Expansion/Accessory Muscles/Retractions no use of accessory muscles;no retractions;expansion symmetric   All Lung Fields Breath Sounds wheezes, inspiratory;wheezes, expiratory;coarse   Rhythm/Pattern, Respiratory pattern regular;depth regular;unlabored   Cough Frequency infrequent   PRE-TX-O2   O2 Device (Oxygen Therapy) nasal cannula   $ Is the patient on Low Flow Oxygen? Yes   Flow (L/min) 5   Pulse Oximetry Type Continuous   $ Pulse Oximetry - Multiple Charge Pulse Oximetry - Multiple   Aerosol Therapy   $ Aerosol Therapy Charges Aerosol Treatment   Daily Review of Necessity (SVN) completed   Respiratory Treatment Status (SVN) given   Treatment Route (SVN) mask;oxygen   Patient Position (SVN) HOB elevated   Post Treatment Assessment (SVN) breath sounds unchanged   Signs of Intolerance (SVN) none   Breath Sounds Post-Respiratory Treatment   Throughout All Fields Post-Treatment All Fields   Throughout All Fields Post-Treatment Anterior:;Posterior:;no change   Post-treatment Heart Rate (beats/min) 112   Post-treatment Resp Rate (breaths/min) 22   Education   $ Education Bronchodilator;15 min

## 2022-04-26 NOTE — CONSULTS
GASTROENTEROLOGY INPATIENT CONSULT NOTE  Patient Name: Griselda Neely  Patient MRN: [id number]  Patient : 1945    Admit Date: 2022  Service date: 2022    Reason for Consult: coffee ground n/v and diarrhea    PCP: Denver Cerna NP    Chief Complaint   Patient presents with    Vomiting     BLACK... EMSESIS UNK NUM DAYS    Diarrhea    Abdominal Pain    Shortness of Breath       HPI: Patient is a 76 y.o. 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 diff  presents for evaluation of abd cramping and loose stools. Acute onset, intermittent, non-resolving slight improved. Also notes n/v intermittently over past few days w/ trace dark emesis.  No melena or rectal bleeding. Seen in f/u over past year and refused any further endoscopy including colonoscopy for known polyps.     CHART REVIEW:  Labs  on admission - Hg 12 (Baseline 10-11ish); C. Diff negative.   CT Abd  - Nml liver, GB, biliary , panc; + vascular dz; ascites; L sided colitis  C.diff +  s/p vanco x 2 weeks  CT Ch / Abd  - cardiomegaly; effusions;?cirrhosis?; atrophic kidneys, increase abd ascites; extensive vascular dz  CT  - s/p LINDSAY / missael; tics; vascular dz; nml liver, panc, biliary, bowels  PUSH  - gastric / duodenal avm's s/p ablation; old blood in stomach.  Colon 10/'20 - Polyps NOT REMOVED: ascending AVM s/p ablation and sig diverticulosis;  EGD 10/'20 - min gastritis  CT ABd  - pleural effusions; cardiomegaly; Left atrial thrombus; vascular disease; tics;  RUQ u/s - prominence in head of pancreas; normal bile ducts; s/p missael    Past Medical History:  Past Medical History:   Diagnosis Date    Anemia     Atrial fibrillation     Calciphylaxis  07/2017    both legs    CHF (congestive heart failure)     Diverticulitis 11/2021    Encounter for blood transfusion 03/2016    Gout     Hemodialysis access, AV graft     Hypertension     Mitral valve regurgitation     Osteoarthritis     Pancreatitis     Peripheral vascular disease     Pneumonia 09/09/2017    Renal failure         Past Surgical History:  Past Surgical History:   Procedure Laterality Date    ACHILLES TENDON SURGERY Right     ANGIOGRAPHY OF ARTERIOVENOUS SHUNT Right 1/7/2020    Procedure: Fistulogram with Possible Intervention;  Surgeon: Joseph Loyola MD;  Location: [facility] CATH/EP LAB;  Service: Cardiology;  Laterality: Right;    ANGIOGRAPHY OF LOWER EXTREMITY Left 3/18/2020    Procedure: Angiogram Extremity Unilateral;  Surgeon: Ali Khoobehi, MD;  Location: [facility] CATH/EP LAB;  Service: Cardiology;  Laterality: Left;    APPENDECTOMY      CARDIAC CATHETERIZATION  07/03/2017    CHOLECYSTECTOMY      COLONOSCOPY Left 10/4/2020    Procedure: COLONOSCOPY;  Surgeon: Jordan Kilpatrick MD;  Location: [facility] ENDO;  Service: Endoscopy;  Laterality: Left;    ESOPHAGOGASTRODUODENOSCOPY Left 8/17/2020    Procedure: EGD (ESOPHAGOGASTRODUODENOSCOPY);  Surgeon: Talat Trevizo MD;  Location: [facility];  Service: Endoscopy;  Laterality: Left;    ESOPHAGOGASTRODUODENOSCOPY Left 10/2/2020    Procedure: EGD (ESOPHAGOGASTRODUODENOSCOPY);  Surgeon: Talat Trevizo MD;  Location: [facility];  Service: Endoscopy;  Laterality: Left;    ESOPHAGOGASTRODUODENOSCOPY N/A 6/29/2021    Procedure: EGD (ESOPHAGOGASTRODUODENOSCOPY);  Surgeon: Sudheer Brown III, MD;  Location: [facility];  Service: Endoscopy;  Laterality: N/A;    FISTULOGRAM Bilateral 4/11/2022    Procedure: FISTULOGRAM;  Surgeon: Joseph Loyola MD;  Location: [facility] CATH/EP LAB;  Service: General;  Laterality: Bilateral;    heal surgery Right     HYSTERECTOMY      INSERTION OF STENT INTO PERIPHERAL VESSEL N/A 1/7/2020    Procedure:  INSERTION, STENT, VESSEL, PERIPHERAL;  Surgeon: Joseph Loyola MD;  Location: [facility] CATH/EP LAB;  Service: Cardiology;  Laterality: N/A;    MITRAL VALVE REPLACEMENT      PARATHYROIDECTOMY      PARATHYROIDECTOMY  07/13/2017    PERCUTANEOUS TRANSLUMINAL ANGIOPLASTY OF ARTERIOVENOUS FISTULA N/A 1/7/2020    Procedure: PTA, AV FISTULA;  Surgeon: Joseph Loyola MD;  Location: [facility] CATH/EP LAB;  Service: Cardiology;  Laterality: N/A;    PERITONEAL CATHETER INSERTION      RENAL BIOPSY      SMALL BOWEL ENTEROSCOPY N/A 12/2/2020    Procedure: ENTEROSCOPY;  Surgeon: Sudheer Brown III, MD;  Location: [facility] ENDO;  Service: Endoscopy;  Laterality: N/A;    vocal cord nodule      WOUND DEBRIDEMENT Left 7/13/2020    Procedure: DEBRIDEMENT, WOUND;  Surgeon: Carlos Elam III, MD;  Location: [facility] OR;  Service: General;  Laterality: Left;        Home Medications:  Medications Prior to Admission   Medication Sig Dispense Refill Last Dose    albuterol (ACCUNEB) 1.25 mg/3 mL Nebu Take 1.25 mg by nebulization every 6 (six) hours as needed. Rescue  HCS   Past Week at Unknown time    albuterol (PROVENTIL/VENTOLIN HFA) 90 mcg/actuation inhaler Inhale 1 puff into the lungs every 4 (four) hours as needed for Wheezing. rescue   Past Week at Unknown time    aspirin (ECOTRIN) 81 MG EC tablet Take 81 mg by mouth once daily.   Past Week at Unknown time    cyanocobalamin (VITAMIN B-12) 1000 MCG tablet Take 1 tablet (1,000 mcg total) by mouth once daily. 30 tablet 0 Past Week at Unknown time    diltiaZEM (CARDIZEM) 30 MG tablet Take 1 tablet (30 mg total) by mouth 4 (four) times daily before meals and nightly. 120 tablet 11 Past Week at Unknown time    heparin sodium,porcine (HEPARIN, PORCINE,) 10,000 unit/mL injection Heparin Sodium (Porcine) 1,000 Units/mL Systemic       isosorbide mononitrate (IMDUR) 30 MG 24 hr tablet Take 1 tablet (30 mg total) by mouth once daily. 90 tablet 3 Past Week at Unknown time    LORazepam  (ATIVAN) 0.5 MG tablet Take 1 tablet (0.5 mg total) by mouth every 6 (six) hours as needed for Anxiety. 30 tablet 0 Past Week at Unknown time    losartan (COZAAR) 25 MG tablet Take 1 tablet (25 mg total) by mouth every evening. 90 tablet 1 Past Week at Unknown time    metoprolol succinate (TOPROL-XL) 25 MG 24 hr tablet Take 1 tablet by mouth nightly 90 tablet 0 Past Week at Unknown time    mupirocin (BACTROBAN) 2 % ointment Apply topically 3 (three) times daily. for 14 days 2 g 4 Past Week at Unknown time    ondansetron (ZOFRAN) 4 MG tablet Take 1 tablet (4 mg total) by mouth every 6 (six) hours as needed for Nausea. 12 tablet 0 4/26/2022 at 07:00    oxyCODONE-acetaminophen (PERCOCET) 5-325 mg per tablet Take 1 tablet by mouth every 4 (four) hours as needed for Pain. 10 tablet 0 4/25/2022 at 07:00    pantoprazole (PROTONIX) 40 MG tablet Take 1 tablet by mouth once daily 90 tablet 0 Past Week at Unknown time    Saccharomyces boulardii (FLORASTOR) 250 mg capsule 1 capsule DAILY (route: oral)       VITAMIN B COMPLEX ORAL 1 tablet DAILY (route: oral)       warfarin (COUMADIN) 2.5 MG tablet Take 2.5 mg by mouth Daily.   Past Week at Unknown time    calcium acetate,phosphat bind, (PHOSLO) 667 mg capsule Take 667 mg by mouth 3 (three) times daily with meals.   More than a month at Unknown time    montelukast (SINGULAIR) 10 mg tablet Take 10 mg by mouth every evening.       Saccharomyces boulardii (FLORASTOR) 250 mg capsule Take 1 capsule (250 mg total) by mouth with lunch. 30 capsule 0     sodium chloride 0.9% SolP 100 mL with iron sucrose 100 mg iron/5 mL Soln 100 mg 50 mg.          Inpatient Medications:   albuterol-ipratropium  3 mL Nebulization Q6H    calcium acetate(phosphat bind)  667 mg Oral TID WM    cyanocobalamin  1,000 mcg Oral Daily    diltiaZEM  30 mg Oral QID (AC & HS)    furosemide (LASIX) injection  20 mg Intravenous Once    metoprolol succinate  25 mg Oral Nightly    pantoprazole  40 mg  "Intravenous BID     dextrose 10%, dextrose 10%, HYDROcodone-acetaminophen, LORazepam, melatonin, ondansetron, sodium chloride 0.9%    Review of patient's allergies indicates:  No Known Allergies    Social History:   Social History     Occupational History    Not on file   Tobacco Use    Smoking status: Current Some Day Smoker     Packs/day: 0.50     Years: 45.00     Pack years: 22.50     Types: Cigarettes    Smokeless tobacco: Never Used   Substance and Sexual Activity    Alcohol use: No    Drug use: No    Sexual activity: Not Currently       Family History:   Family History   Problem Relation Age of Onset    Heart disease Sister     Early death Sister         heart as baby    Heart disease Maternal Grandfather     Diabetes Mother     Hypertension Mother     Heart failure Mother     Heart disease Mother     Arthritis Mother     Diabetes Father     Early death Sister         infant       Review of Systems:  A 10 point review of systems was performed and was normal, except as mentioned in the HPI, including constitutional, HEENT, heme, lymph, cardiovascular, respiratory, gastrointestinal, genitourinary, neurologic, endocrine, psychiatric and musculoskeletal.      OBJECTIVE:    Physical Exam:  24 Hour Vital Sign Ranges: Temp:  [98.6 °F (37 °C)] 98.6 °F (37 °C)  Pulse:  [107-114] 110  Resp:  [18-28] 18  SpO2:  [86 %-100 %] 98 %  BP: ()/(60-76) 116/70  Most recent vitals: /70   Pulse 110   Temp 98.6 °F (37 °C) (Oral)   Resp 18   Ht 5' 5" (1.651 m)   Wt 60.3 kg (133 lb)   LMP  (LMP Unknown)   SpO2 98%   BMI 22.13 kg/m²    GEN: somnolent, chronic ill appearing thin elderly BF  HEENT: PERRL, sclera anicteric, oral mucosa pink and moist without lesion  NECK: trachea midline; Good ROM  CV: irregular rate and rhythm,   RESP: clear to auscultation bilaterally, no wheezes, rhonci or rales  ABD: soft, min-tender, non-distended, hypoactive bowel sounds  EXT: no swelling or edema, 1+ pulses " distally  SKIN: no rashes or jaundice  PSYCH: normal affect    Labs:   Recent Labs     04/26/22  0910   WBC 7.60   MCV 93        Recent Labs     04/26/22  0910   *   K 4.2   CL 91*   CO2 22*   BUN 38*   GLU 58*     No results for input(s): ALB in the last 72 hours.    Invalid input(s): ALKP, SGOT, SGPT, TBIL, DBIL, TPRO  Recent Labs     04/26/22  0910   INR 2.3         Radiology Review:  CT Abdomen Pelvis  Without Contrast   Final Result      X-Ray Chest AP Portable   Final Result            IMPRESSION / RECOMMENDATIONS:  76 y.o. 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 diff 4/'22 presents for evaluation of abd cramping, loose stools and coffee ground emesis w/ colitis on CT. Hg stable, C. Diff negative and patient remains adamant that she wants no procedures.     -Conservative for now per patient  -PPI daily w/ anti-emetics prn  -If any concerns for ongoing bleeding, revisit endoscopy if patient agreeable  -If progressive abd distension or worsening ascites, could consider para (coumadin would need to be held)    Thank you for this consult.    Sudheer SALAZAR Dauterive III  4/26/2022  5:21 PM

## 2022-04-26 NOTE — ED NOTES
76 YOF c/o left abdominal pain, n/v/d X 3 days with no improvement. Stated coffee ground emesis. -SOB -CP. Fistula noted to right upper arm. Dialysis every Monday, Wednesday and Friday. Denies any other complaints.     Adult Physical Assessment  LOC: Patient is awake, alert, oriented and speaking appropriately at this time.  APPEARANCE: Patient resting comfortably and appears to be in no acute distress at this time. Patient is clean and well groomed, patient's clothing is properly fastened.  SKIN:The skin is warm and dry, color consistent with ethnicity, patient has normal skin turgor and moist mucus membranes, skin intact, no breakdown or brusing noted.  MUSCULOSKELETAL: Patient moving all extremities well, no obvious swelling or deformities noted.  RESPIRATORY: Airway is open and patent, respirations are spontaneous, patient has a normal effort and rate, no accessory muscle use noted.  CARDIAC: Patient has a normal rate and rhythm, no periphreal edema noted in any extremity, capillary refill < 3 seconds in all extremities.  ABDOMEN: Soft and non tender to palpation, no abdominal distention noted.  NEUROLOGIC: Eyes open spontaneously, behavior appropriate to situation, follows commands, facial expression symmetrical, bilateral hand grasp equal and even, purposeful motor response noted, normal sensation in all extremities when touched with a finger.      VSS. ED workup in progress. Sister, Carline [phone] at bedside. Call light within pt reach. Safety measures in place: side rails up X2. Will continue to monitor.

## 2022-04-26 NOTE — ED NOTES
Report given to TYRONE Munoz. All questions answered. Pt transferred to Room 2209 on continuous monitor via stretcher with nurse transport. Pt belongings on stretcher with pt. Safety measures in place: side rails up X2.

## 2022-04-26 NOTE — ED PROVIDER NOTES
Encounter Date: 4/26/2022       History     Chief Complaint   Patient presents with    Vomiting     BLACK... EMSESIS UNK NUM DAYS    Diarrhea    Abdominal Pain    Shortness of Breath     76-year-old female with history of multiple medical problems including end-stage renal disease followed by Dr. Ingram with dialysis on Monday the, Wednesday, Friday, hypertension, pulmonary hypertension, COPD on 4 L nasal cannula, anemia, hyperlipidemia, peripheral vascular disease, systolic heart failure, mitral valve replacement, atrial fibrillation on Coumadin, recent diagnosis of C diff colitis (3/31).  Patient presents emergency department with complaint of left lower quadrant abdominal pain, coffee-ground emesis and nausea vomiting for the last 1-2 days.  According to family patient with much more weakness which was noted over last 24 hours.  Decided come to emergency department for further evaluation.        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    both legs    CHF (congestive heart failure)     Diverticulitis 11/2021    Encounter for blood transfusion 03/2016    Gout     Hemodialysis access, AV graft     Hypertension     Mitral valve regurgitation     Osteoarthritis     Pancreatitis     Peripheral vascular disease     Pneumonia 09/09/2017    Renal failure      Past Surgical History:   Procedure Laterality Date    ACHILLES TENDON SURGERY Right     ANGIOGRAPHY OF ARTERIOVENOUS SHUNT Right 1/7/2020    Procedure: Fistulogram with Possible Intervention;  Surgeon: Joseph Loyola MD;  Location: [facility] CATH/EP LAB;  Service: Cardiology;  Laterality: Right;    ANGIOGRAPHY OF LOWER EXTREMITY Left 3/18/2020    Procedure: Angiogram Extremity Unilateral;  Surgeon: Ali Khoobehi, MD;  Location: [facility] CATH/EP LAB;  Service: Cardiology;  Laterality: Left;    APPENDECTOMY      CARDIAC  CATHETERIZATION  07/03/2017    CHOLECYSTECTOMY      COLONOSCOPY Left 10/4/2020    Procedure: COLONOSCOPY;  Surgeon: Jordan Kilpatrick MD;  Location: [facility] ENDO;  Service: Endoscopy;  Laterality: Left;    ESOPHAGOGASTRODUODENOSCOPY Left 8/17/2020    Procedure: EGD (ESOPHAGOGASTRODUODENOSCOPY);  Surgeon: Talat Trevizo MD;  Location: [facility] ENDO;  Service: Endoscopy;  Laterality: Left;    ESOPHAGOGASTRODUODENOSCOPY Left 10/2/2020    Procedure: EGD (ESOPHAGOGASTRODUODENOSCOPY);  Surgeon: Talat Trevizo MD;  Location: [facility] ENDO;  Service: Endoscopy;  Laterality: Left;    ESOPHAGOGASTRODUODENOSCOPY N/A 6/29/2021    Procedure: EGD (ESOPHAGOGASTRODUODENOSCOPY);  Surgeon: Sudheer Brown III, MD;  Location: [facility] ENDO;  Service: Endoscopy;  Laterality: N/A;    FISTULOGRAM Bilateral 4/11/2022    Procedure: FISTULOGRAM;  Surgeon: Joseph Loyola MD;  Location: [facility] CATH/EP LAB;  Service: General;  Laterality: Bilateral;    heal surgery Right     HYSTERECTOMY      INSERTION OF STENT INTO PERIPHERAL VESSEL N/A 1/7/2020    Procedure: INSERTION, STENT, VESSEL, PERIPHERAL;  Surgeon: Joseph Loyola MD;  Location: [facility] CATH/EP LAB;  Service: Cardiology;  Laterality: N/A;    MITRAL VALVE REPLACEMENT      PARATHYROIDECTOMY      PARATHYROIDECTOMY  07/13/2017    PERCUTANEOUS TRANSLUMINAL ANGIOPLASTY OF ARTERIOVENOUS FISTULA N/A 1/7/2020    Procedure: PTA, AV FISTULA;  Surgeon: Joseph Loyola MD;  Location: [facility] CATH/EP LAB;  Service: Cardiology;  Laterality: N/A;    PERITONEAL CATHETER INSERTION      RENAL BIOPSY      SMALL BOWEL ENTEROSCOPY N/A 12/2/2020    Procedure: ENTEROSCOPY;  Surgeon: Sudheer Brown III, MD;  Location: [facility] ENDO;  Service: Endoscopy;  Laterality: N/A;    vocal cord nodule      WOUND DEBRIDEMENT Left 7/13/2020    Procedure: DEBRIDEMENT, WOUND;  Surgeon: Carlos Elam III, MD;  Location: [facility] OR;  Service: General;  Laterality: Left;     Family History   Problem Relation Age of  Onset    Heart disease Sister     Early death Sister         heart as baby    Heart disease Maternal Grandfather     Diabetes Mother     Hypertension Mother     Heart failure Mother     Heart disease Mother     Arthritis Mother     Diabetes Father     Early death Sister         infant     Social History     Tobacco Use    Smoking status: Current Some Day Smoker     Packs/day: 0.50     Years: 45.00     Pack years: 22.50     Types: Cigarettes    Smokeless tobacco: Never Used   Substance Use Topics    Alcohol use: No    Drug use: No     Review of Systems   Constitutional: Positive for fatigue. Negative for fever.   HENT: Negative for sore throat.    Respiratory: Negative for shortness of breath.    Cardiovascular: Negative for chest pain.   Gastrointestinal: Positive for abdominal pain, diarrhea, nausea and vomiting.   Genitourinary: Negative for dysuria.   Musculoskeletal: Negative for back pain.   Skin: Negative for rash.   Neurological: Positive for weakness and light-headedness.   Hematological: Does not bruise/bleed easily.       Physical Exam     Initial Vitals [04/26/22 0817]   BP Pulse Resp Temp SpO2   127/75 (!) 112 (!) 28 98.6 °F (37 °C) (!) 86 %      MAP       --         Physical Exam    Nursing note and vitals reviewed.  Constitutional:   Thin elderly appearing female, appears older than stated age   HENT:   Head: Normocephalic and atraumatic.   Nose: Nose normal.   Mouth/Throat: Oropharynx is clear and moist.   Eyes: Conjunctivae and EOM are normal. Pupils are equal, round, and reactive to light.   Neck: Neck supple. No thyromegaly present. No tracheal deviation present.   Normal range of motion.  Cardiovascular: Normal rate, regular rhythm, normal heart sounds and intact distal pulses. Exam reveals no gallop and no friction rub.    No murmur heard.  Pulmonary/Chest: No stridor. No respiratory distress.   Coarse bibasilar breath sounds with bibasilar crackles noted   Abdominal: Abdomen is  soft. Bowel sounds are normal. She exhibits no distension and no mass. There is abdominal tenderness.   Mid epigastric, left upper quadrant abdominal pain There is no rebound and no guarding.   Musculoskeletal:         General: No edema. Normal range of motion.      Cervical back: Normal range of motion and neck supple.     Lymphadenopathy:     She has no cervical adenopathy.   Neurological: She is alert and oriented to person, place, and time. She has normal strength and normal reflexes. GCS score is 15. GCS eye subscore is 4. GCS verbal subscore is 5. GCS motor subscore is 6.   Skin: Skin is warm and dry. Capillary refill takes less than 2 seconds.   Psychiatric: She has a normal mood and affect.         ED Course   Procedures  Labs Reviewed   CBC W/ AUTO DIFFERENTIAL - Abnormal; Notable for the following components:       Result Value    MCHC 31.4 (*)     RDW 16.5 (*)     Lymph # 0.5 (*)     nRBC 1 (*)     Gran % 84.6 (*)     Lymph % 7.0 (*)     All other components within normal limits   COMPREHENSIVE METABOLIC PANEL - Abnormal; Notable for the following components:    Sodium 131 (*)     Chloride 91 (*)     CO2 22 (*)     Glucose 58 (*)     BUN 38 (*)     Creatinine 5.9 (*)     Calcium 8.4 (*)     Total Protein 8.7 (*)     Total Bilirubin 1.6 (*)     AST 55 (*)     Anion Gap 18 (*)     eGFR if  7.4 (*)     eGFR if non  6.5 (*)     All other components within normal limits    Narrative:     Glu critical result(s) repeated. Called and verbal readback obtained   from Susan Dos Santos RN/ED by RAMIRO 04/26/2022 10:41   TROPONIN I - Abnormal; Notable for the following components:    Troponin I 0.081 (*)     All other components within normal limits   B-TYPE NATRIURETIC PEPTIDE - Abnormal; Notable for the following components:    BNP >4,500 (*)     All other components within normal limits   PROTIME-INR - Abnormal; Notable for the following components:    PT 23.9 (*)     All other  components within normal limits   LIPASE - Abnormal; Notable for the following components:    Lipase 167 (*)     All other components within normal limits   LACTIC ACID, PLASMA - Abnormal; Notable for the following components:    Lactate (Lactic Acid) 2.6 (*)     All other components within normal limits    Narrative:     Lactic Acid critical result(s) repeated. Called and verbal readback   obtained from Joseph Michelle RN ER.  by CW1 04/26/2022 13:52   POCT GLUCOSE - Abnormal; Notable for the following components:    POC Glucose 63 (*)     All other components within normal limits   POCT GLUCOSE - Abnormal; Notable for the following components:    POC Glucose 157 (*)     All other components within normal limits   CLOSTRIDIUM DIFFICILE   CULTURE, STOOL   CULTURE, BLOOD   CULTURE, BLOOD   SARS-COV-2 RNA AMPLIFICATION, QUAL   B-TYPE NATRIURETIC PEPTIDE   WBC, STOOL   OCCULT BLOOD X 1, STOOL   APTT   APTT   STOOL EXAM-OVA,CYSTS,PARASITES   TROPONIN I   LACTIC ACID, PLASMA   TYPE & SCREEN   INDIRECT ANTIGLOBULIN TEST   POCT GLUCOSE   POCT GLUCOSE MONITORING CONTINUOUS        ECG Results          EKG 12-lead (In process)  Result time 04/26/22 08:34:08    In process by Interface, Lab In [facility] (04/26/22 08:34:08)                 Narrative:    Test Reason : R06.02,    Vent. Rate : 124 BPM     Atrial Rate : 068 BPM     P-R Int : 000 ms          QRS Dur : 098 ms      QT Int : 350 ms       P-R-T Axes : 000 -72 148 degrees     QTc Int : 502 ms    Atrial fibrillation with rapid ventricular response  Left anterior fascicular block  ST and T wave abnormality, consider lateral ischemia  Abnormal ECG  When compared with ECG of 31-MAR-2022 22:49,  No significant change was found    Referred By: [email or web address]   SELF           Confirmed By:                             Imaging Results          CT Abdomen Pelvis  Without Contrast (Final result)  Result time 04/26/22 11:19:00    Final result by Alonso Bain MD (04/26/22 11:19:00)                  Narrative:    CMS MANDATED QUALITY DATA - CT RADIATION  436    All CT scans at this facility utilize dose modulation, iterative reconstruction, and/or weight based dosing when appropriate to reduce radiation dose to as low as reasonably achievable.    CLINICAL HISTORY:  76 years (1945) Female LLQ abdominal pain    TECHNIQUE:  CT ABDOMEN PELVIS WITHOUT IV CONTRAST. 221 images obtained. Axial CT images of the abdomen and pelvis were obtained from the dome of the diaphragm to the proximal thigh.    CONTRAST:  No IV contrast was administered    COMPARISON:  CT from April 1, 2022.    FINDINGS:.  Lower Thorax:  Moderate right and trace left pleural effusion and adjacent atelectasis. The right pleural effusion is somewhat loculated at the right lung base but is unchanged from the previous exam. The heart is markedly enlarged in size with scattered coronary artery calcifications, mural left atrial calcification and annular mitral valve calcification.    CT Abdomen:  Liver: The liver is normal in size and imaging appearance.  Gallbladder: Not seen.  Biliary Tree: No intra or extrahepatic ductal dilation.  Spleen: Within normal limits.  Pancreas: The pancreas is normal.  Adrenal Glands: The adrenal glands appear within normal limits.  Kidneys: Small/atrophic with renal cortical thinning.  Vasculature: Diffuse circumferential vascular calcifications are seen throughout the arteries of the abdomen and pelvis.  Lymph nodes: No abdominal lymphadenopathy is seen.  Intraperitoneal structures: Moderate volume of abdominopelvic ascites and mild diffuse body wall edema (anasarca).  Bowel: Marked, sigmoid predominant diverticulosis, without focal diverticulitis identified on this exam. There is mild relative long segment thickening of the rectosigmoid colon. No finding of bowel obstruction, intra-abdominal free air or abscess.  Abdominal wall: Mild diffuse of body wall edema (anasarca).  Musculoskeletal: No acute  osseous abnormality is identified .    CT Pelvis:  Bladder: The urinary bladder is within normal limits.  Reproductive Organs: The uterus is nonvisualized, surgically absent.  Pelvic Lymph nodes: No lymphadenopathy.    IMPRESSION:  1. Mild relative long segment bowel wall thickening in the rectosigmoid colon suggestive of mild colitis without finding of bowel obstruction, intra-abdominal free air or abscess.  2. Marked, sigmoid predominant diverticulosis without focal diverticulitis.  3. Moderate volume of abdominopelvic ascites.  4. Moderate right and small left pleural effusion, similar to the previous exam.  5. Numerous additional, and incidental findings as noted above.                    .    Electronically signed by:  Alonso Bain MD  4/26/2022 11:19 AM CDT Workstation: [id number]                             X-Ray Chest AP Portable (Final result)  Result time 04/26/22 08:42:50    Final result by Leonel Aguilar MD (04/26/22 08:42:50)                 Narrative:    Reason: SOB Vomiting; Diarrhea; Abdominal Pain; Shortness of Breath    FINDINGS:  Portable chest at 837 compared with 3/31/2022 shows no change in enlarged cardiac silhouette size. Prominence of the main pulmonary outflow track may be related to patient rotation. Prior cardiac valve replacement again evident.    Central pulmonary vascular prominence has not significantly changed. Right mid and lower lung zone pleural-parenchymal opacities remain similar to the prior exam. Diffuse pulmonary groundglass opacities bilaterally have slightly progressed. No pneumothorax.    No acute osseous abnormality. Vascular stent noted in right upper arm, unchanged.    IMPRESSION:    1. Unchanged cardiomegaly and Central pulmonary vascular prominence.  2. Bilateral perihilar groundglass alveolar opacities suggest alveolar edema either related to heart failure or hypervolemia.  3. Persistent right basilar pleural-parenchymal opacity suggesting small right pleural  effusion perhaps with more confluent alveolar edema or other alveolar consolidation versus atelectasis.    Electronically signed by:  Leonel Aguilar MD  4/26/2022 8:42 AM CDT Workstation: [id number]                               Medications   calcium acetate(phosphat bind) capsule 667 mg (has no administration in time range)   cyanocobalamin tablet 1,000 mcg (has no administration in time range)   diltiaZEM tablet 30 mg (has no administration in time range)   LORazepam tablet 0.5 mg (0.5 mg Oral Given 4/26/22 1510)   metoprolol succinate (TOPROL-XL) 24 hr tablet 25 mg (has no administration in time range)   sodium chloride 0.9% flush 10 mL (has no administration in time range)   HYDROcodone-acetaminophen 5-325 mg per tablet 1 tablet (1 tablet Oral Given 4/26/22 1510)   ondansetron injection 4 mg (has no administration in time range)   melatonin tablet 6 mg (has no administration in time range)   albuterol-ipratropium 2.5 mg-0.5 mg/3 mL nebulizer solution 3 mL (has no administration in time range)   pantoprazole injection 40 mg (has no administration in time range)   dextrose 10% bolus 125 mL (has no administration in time range)   dextrose 10% bolus 250 mL (has no administration in time range)   piperacillin-tazobactam 3.375 g in dextrose 5 % 50 mL IVPB (ready to mix system) (has no administration in time range)   ondansetron injection 4 mg (4 mg Intravenous Given 4/26/22 0949)   pantoprazole injection 40 mg (40 mg Intravenous Given 4/26/22 0949)   levalbuterol nebulizer solution 1.25 mg (1.25 mg Nebulization Given 4/26/22 1159)   dextrose 50 % in water (D50W) injection 25 g (25 g Intravenous Given 4/26/22 1049)   piperacillin-tazobactam 3.375 g in dextrose 5 % 50 mL IVPB (ready to mix system) (0 g Intravenous Stopped 4/26/22 1416)     Medical Decision Making:   Initial Assessment:   76-year-old female with history of multiple medical problems including end-stage renal disease followed by Dr. Ingram with dialysis on  Monday the, Wednesday, Friday, hypertension, pulmonary hypertension, COPD on 4 L nasal cannula, anemia, hyperlipidemia, peripheral vascular disease, systolic heart failure, mitral valve replacement, atrial fibrillation on Coumadin, recent diagnosis of C diff colitis (3/31).  Patient presents emergency department with complaint of left lower quadrant abdominal pain, coffee-ground emesis and nausea vomiting for the last 1-2 days.  According to family patient with much more weakness which was noted over last 24 hours.  Decided come to emergency department for further evaluation.        Differential Diagnosis:   Colitis, gastritis, enteritis, dehydration, GI bleed,  Clinical Tests:   Lab Tests: Ordered and Reviewed  Radiological Study: Ordered and Reviewed  Medical Tests: Ordered and Reviewed            Attending Attestation:         Attending Critical Care:   Critical Care Times:   Direct Patient Care (initial evaluation, reassessments, and time considering the case)................................................................30 minutes.   Additional History from reviewing old medical records or taking additional history from the family, EMS, PCP, etc.......................10 minutes.   Ordering, Reviewing, and Interpreting Diagnostic Studies...............................................................................................................10 minutes.   Documentation..................................................................................................................................................................................10 minutes.   Consultation with other Physicians. .................................................................................................................................................10 minutes.   Consultation with the patient's family directly relating to the patient's condition, care, and DNR status (when patient unable)......5 minutes.    ==============================================================  · Total Critical Care Time - exclusive of procedural time: 75 minutes.  ==============================================================  Critical care was necessary to treat or prevent imminent or life-threatening deterioration of the following conditions: renal failure and metabolic crisis.   Critical care was time spent personally by me on the following activities: obtaining history from patient or relative, examination of patient, review of x-rays / CT sent with the patient, review of old charts, ordering lab, x-rays, and/or EKG, development of treatment plan with patient or relative, ordering and performing treatments and interventions, evaluation of patient's response to treatment, discussions with primary provider, discussion with consultants, interpretation of cardiac measurements and re-evaluation of patient's conition.   Critical Care Condition: potentially life-threatening           ED Course as of 04/26/22 1617   Tue Apr 26, 2022   1245 Patient seen evaluated emergency department.  Patient found with generalized abdominal pain and left lower quadrant abdominal pain.  CT abdomen pelvis non contrasted study found with evidence of acute colitis.  Patient did have elevation in lipase as well.  Patient with previous history of chronic pancreatitis.  Currently at this time patient also found to have increased interstitial edema on chest x-ray BNP are elevated at 4500. Patient was transition to BiPAP with longstanding history of COPD also given nebulized treatments emergency department.  Patient case was discussed with Nephrology and they will consider as 2 dialyzed patient during inpatient admission.  Patient was given Zosyn in emergency department.  Patient case also discussed with Gastroenterology currently stool cultures are pending.  Patient will be admitted to hospitalist services for treatment of colitis possible pancreatitis as well as  pulmonary edema secondary to chronic end-stage renal failure. [RM]      ED Course User Index  [RM] Daren Ramirez MD             Clinical Impression:   Final diagnoses:  [R06.02] SOB (shortness of breath)  [K52.9] Colitis (Primary)  [J81.0] Acute pulmonary edema  [N18.6] End stage renal disease  [R06.03] Respiratory distress  [I50.43] Acute on chronic heart failure with reduced ejection fraction and diastolic dysfunction          ED Disposition Condition    Admit               Daren Ramirez MD  04/26/22 1223       Daren Ramirez MD  04/26/22 1237       Daren Ramirez MD  04/26/22 1617

## 2022-04-26 NOTE — H&P
[facility] History & Physical Examination   Patient Name: Griselda Neely  MRN: [id number]  Patient Class: Emergency   Admission Date: 4/26/2022  8:09 AM  Length of Stay: 0  Attending Physician:   Primary Care Provider: Denver Cerna NP  Face-to-Face encounter date: 04/26/2022  Code Status:  DNR  MPOA:  Chief Complaint: Vomiting (BLACK... EMSESIS UNK NUM DAYS), Diarrhea, Abdominal Pain, and Shortness of Breath        Patient information was obtained from patient, past medical records and ER records.   HISTORY OF PRESENT ILLNESS:   Griselda Neely is a 76 y.o. old female who  has a past medical history of Anemia, Atrial fibrillation, Calciphylaxis (07/2017), CHF (congestive heart failure), Diverticulitis (11/2021), Encounter for blood transfusion (03/2016), Gout, Hemodialysis access, AV graft, Hypertension, Mitral valve regurgitation, Osteoarthritis, Pancreatitis, Peripheral vascular disease, Pneumonia (09/09/2017), and Renal failure.. The patient presented to [facility] on 4/26/2022 with a primary complaint of Vomiting (BLACK... EMSESIS UNK NUM DAYS), Diarrhea, Abdominal Pain, and Shortness of Breath  .   76-year-old  female presents to the emergency room with shortness of breath diarrhea abdominal pain and hematemesis    This patient has a known history of end-stage renal disease requiring hemodialysis 3 times a week on Monday Wednesdays and Fridays hypertension pulmonary hypertension valvular heart disease status post bioprosthetic mitral valve replacement, atrial fibrillation anticoagulated with Coumadin, C diff colitis diagnosed on March 31, 2022 had been on oral vancomycin, peripheral vascular disease, hyperlipidemia COPD and tobacco abuse    The patient presents emergency room complaints of left lower quadrant abdominal pain that has been on and off for the past 2-3 days.  The patient states that she had episode of coffee-ground emesis  along with the abdominal pain for the past 1-2 days.  The patient states she has been having generalized weakness and fatigue.  She denies fever chills black or bloody stools lightheadedness dizziness chest pain or syncope.  She describes her symptoms as moderate to severe severity with no alleviating or exacerbating factors    She did endorse worsening shortness of breath and dyspnea with minimal activity.  There was no associated chest pain with the shortness of breath.  The patient is on home oxygen at home and continues to use cigarettes daily    REVIEW OF SYSTEMS:   10 Point Review of System was performed and was found to be negative except for that mentioned already in the HPI and   Review of Systems (Negative unless checked off)  Review of Systems   Constitutional: Positive for malaise/fatigue.   HENT: Negative.    Eyes: Negative.    Respiratory: Positive for shortness of breath.    Cardiovascular: Positive for leg swelling.   Gastrointestinal: Positive for abdominal pain and diarrhea.   Genitourinary: Negative.    Musculoskeletal: Negative.    Skin: Negative.    Neurological: Positive for weakness.   Endo/Heme/Allergies: Negative.    Psychiatric/Behavioral: Negative.            PAST MEDICAL HISTORY:     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    both legs    CHF (congestive heart failure)     Diverticulitis 11/2021    Encounter for blood transfusion 03/2016    Gout     Hemodialysis access, AV graft     Hypertension     Mitral valve regurgitation     Osteoarthritis     Pancreatitis     Peripheral vascular disease     Pneumonia 09/09/2017    Renal failure        PAST SURGICAL HISTORY:     Past Surgical History:   Procedure Laterality Date    ACHILLES TENDON SURGERY Right     ANGIOGRAPHY OF ARTERIOVENOUS SHUNT Right 1/7/2020    Procedure: Fistulogram with Possible Intervention;  Surgeon: Joseph Loyola MD;  Location: [facility] CATH/EP LAB;   Service: Cardiology;  Laterality: Right;    ANGIOGRAPHY OF LOWER EXTREMITY Left 3/18/2020    Procedure: Angiogram Extremity Unilateral;  Surgeon: Ali Khoobehi, MD;  Location: [facility] CATH/EP LAB;  Service: Cardiology;  Laterality: Left;    APPENDECTOMY      CARDIAC CATHETERIZATION  07/03/2017    CHOLECYSTECTOMY      COLONOSCOPY Left 10/4/2020    Procedure: COLONOSCOPY;  Surgeon: Jordan Kilpatrick MD;  Location: [facility] ENDO;  Service: Endoscopy;  Laterality: Left;    ESOPHAGOGASTRODUODENOSCOPY Left 8/17/2020    Procedure: EGD (ESOPHAGOGASTRODUODENOSCOPY);  Surgeon: Talat Trevizo MD;  Location: [facility] ENDO;  Service: Endoscopy;  Laterality: Left;    ESOPHAGOGASTRODUODENOSCOPY Left 10/2/2020    Procedure: EGD (ESOPHAGOGASTRODUODENOSCOPY);  Surgeon: Talat Trevizo MD;  Location: [facility] ENDO;  Service: Endoscopy;  Laterality: Left;    ESOPHAGOGASTRODUODENOSCOPY N/A 6/29/2021    Procedure: EGD (ESOPHAGOGASTRODUODENOSCOPY);  Surgeon: Sudheer Brown III, MD;  Location: [facility];  Service: Endoscopy;  Laterality: N/A;    FISTULOGRAM Bilateral 4/11/2022    Procedure: FISTULOGRAM;  Surgeon: Joseph Loyola MD;  Location: [facility] CATH/EP LAB;  Service: General;  Laterality: Bilateral;    heal surgery Right     HYSTERECTOMY      INSERTION OF STENT INTO PERIPHERAL VESSEL N/A 1/7/2020    Procedure: INSERTION, STENT, VESSEL, PERIPHERAL;  Surgeon: Joseph Loyola MD;  Location: [facility] CATH/EP LAB;  Service: Cardiology;  Laterality: N/A;    MITRAL VALVE REPLACEMENT      PARATHYROIDECTOMY      PARATHYROIDECTOMY  07/13/2017    PERCUTANEOUS TRANSLUMINAL ANGIOPLASTY OF ARTERIOVENOUS FISTULA N/A 1/7/2020    Procedure: PTA, AV FISTULA;  Surgeon: Joseph Loyola MD;  Location: [facility] CATH/EP LAB;  Service: Cardiology;  Laterality: N/A;    PERITONEAL CATHETER INSERTION      RENAL BIOPSY      SMALL BOWEL ENTEROSCOPY N/A 12/2/2020    Procedure: ENTEROSCOPY;  Surgeon: Sudheer Brown III, MD;  Location: [facility];   Service: Endoscopy;  Laterality: N/A;    vocal cord nodule      WOUND DEBRIDEMENT Left 7/13/2020    Procedure: DEBRIDEMENT, WOUND;  Surgeon: Carlos Elam III, MD;  Location: [facility];  Service: General;  Laterality: Left;       ALLERGIES:   Patient has no known allergies.    FAMILY HISTORY:     Family History   Problem Relation Age of Onset    Heart disease Sister     Early death Sister         heart as baby    Heart disease Maternal Grandfather     Diabetes Mother     Hypertension Mother     Heart failure Mother     Heart disease Mother     Arthritis Mother     Diabetes Father     Early death Sister         infant       SOCIAL HISTORY:     Social History     Tobacco Use    Smoking status: Current Some Day Smoker     Packs/day: 0.50     Years: 45.00     Pack years: 22.50     Types: Cigarettes    Smokeless tobacco: Never Used   Substance Use Topics    Alcohol use: No        Social History     Substance and Sexual Activity   Sexual Activity Not Currently        HOME MEDICATIONS:     Prior to Admission medications    Medication Sig Start Date End Date Taking? Authorizing Provider   albuterol (ACCUNEB) 1.25 mg/3 mL Nebu Take 1.25 mg by nebulization every 6 (six) hours as needed. Rescue  HCS   Yes Historical Provider   albuterol (PROVENTIL/VENTOLIN HFA) 90 mcg/actuation inhaler Inhale 1 puff into the lungs every 4 (four) hours as needed for Wheezing. rescue 12/11/20  Yes Historical Provider   aspirin (ECOTRIN) 81 MG EC tablet Take 81 mg by mouth once daily.   Yes Historical Provider   cyanocobalamin (VITAMIN B-12) 1000 MCG tablet Take 1 tablet (1,000 mcg total) by mouth once daily. 4/3/22 5/3/22 Yes Kulwinder Long MD   diltiaZEM (CARDIZEM) 30 MG tablet Take 1 tablet (30 mg total) by mouth 4 (four) times daily before meals and nightly. 10/14/21 10/14/22 Yes Gabe Davis MD   heparin sodium,porcine (HEPARIN, PORCINE,) 10,000 unit/mL injection Heparin Sodium (Porcine) 1,000 Units/mL Systemic  4/20/22 4/19/23 Yes Historical Provider   isosorbide mononitrate (IMDUR) 30 MG 24 hr tablet Take 1 tablet (30 mg total) by mouth once daily. 1/25/22  Yes Tosha Franklin PA-C   LORazepam (ATIVAN) 0.5 MG tablet Take 1 tablet (0.5 mg total) by mouth every 6 (six) hours as needed for Anxiety. 3/18/22 4/26/22 Yes Denver Cerna NP   losartan (COZAAR) 25 MG tablet Take 1 tablet (25 mg total) by mouth every evening. 1/25/22  Yes Tosha Franklin PA-C   metoprolol succinate (TOPROL-XL) 25 MG 24 hr tablet Take 1 tablet by mouth nightly 1/25/22  Yes Mary Do NP   mupirocin (BACTROBAN) 2 % ointment Apply topically 3 (three) times daily. for 14 days 4/13/22 4/27/22 Yes Denver Cerna NP   ondansetron (ZOFRAN) 4 MG tablet Take 1 tablet (4 mg total) by mouth every 6 (six) hours as needed for Nausea. 1/23/22  Yes Erna Cartagena MD   oxyCODONE-acetaminophen (PERCOCET) 5-325 mg per tablet Take 1 tablet by mouth every 4 (four) hours as needed for Pain. 1/23/22  Yes Erna Cartagena MD   pantoprazole (PROTONIX) 40 MG tablet Take 1 tablet by mouth once daily 1/25/22  Yes Mary Do NP   Saccharomyces boulardii (FLORASTOR) 250 mg capsule 1 capsule DAILY (route: oral) 4/13/22  Yes Historical Provider   VITAMIN B COMPLEX ORAL 1 tablet DAILY (route: oral) 4/13/22  Yes Historical Provider   warfarin (COUMADIN) 2.5 MG tablet Take 2.5 mg by mouth Daily. 2/22/22  Yes Historical Provider   calcium acetate,phosphat bind, (PHOSLO) 667 mg capsule Take 667 mg by mouth 3 (three) times daily with meals.    Historical Provider   montelukast (SINGULAIR) 10 mg tablet Take 10 mg by mouth every evening.    Historical Provider   Saccharomyces boulardii (FLORASTOR) 250 mg capsule Take 1 capsule (250 mg total) by mouth with lunch. 4/3/22 5/3/22  Kulwinder Long MD   sodium chloride 0.9% SolP 100 mL with iron sucrose 100 mg iron/5 mL Soln 100 mg 50 mg. 4/4/22 4/3/23  Historical Provider   hydrALAZINE (APRESOLINE) 50 MG  "tablet Take 1 tablet (50 mg total) by mouth every 8 (eight) hours. 12/31/20 3/4/22  Kalie Neely MD         PHYSICAL EXAM:   BP 99/63   Pulse 108   Temp 98.6 °F (37 °C) (Oral)   Resp (!) 24   Ht 5' 5" (1.651 m)   Wt 60.3 kg (133 lb)   LMP  (LMP Unknown)   SpO2 97%   BMI 22.13 kg/m²   Vitals Reviewed  General appearance:  Frail and ill-appearing female in no apparent distress.  Skin: No Rash.  Dry  Neuro: Motor and sensory exams grossly intact. Good tone. Power in all 4 extremities 5/5.   HENT: Atraumatic head. Moist mucous membranes of oral cavity.  Eyes: Normal extraocular movements.   Neck: Supple. No evidence of lymphadenopathy. No thyroidomegaly.  Lungs:  Coarse rhonchi to bases bilat bilaterally. No wheezing present.   Heart:  Irregularly irregular rate and rhythm. S1 and S2 present with positive murmurs/gallop/rub.  Positive pedal edema. No JVD present.   Abdomen: Soft, non-distended, non-tender. No rebound tenderness/guarding. No masses or organomegaly. Bowel sounds are normal. Bladder is not palpable.   Extremities: No cyanosis, clubbing, positive edema.  Right upper arm AV fistula with good thrill and bruit  Psych/mental status: Alert and oriented. Cooperative. Responds appropriately to questions.   EMERGENCY DEPARTMENT LABS AND IMAGING:   Following labs were Reviewed   Recent Labs   Lab 04/26/22  0910   WBC 7.60   HGB 12.0   HCT 38.2      CALCIUM 8.4*   ALBUMIN 3.7   PROT 8.7*   *   K 4.2   CO2 22*   CL 91*   BUN 38*   CREATININE 5.9*   ALKPHOS 107   ALT 26   AST 55*   BILITOT 1.6*         BMP:   Recent Labs   Lab 04/26/22  0910   GLU 58*   *   K 4.2   CL 91*   CO2 22*   BUN 38*   CREATININE 5.9*   CALCIUM 8.4*   , CMP   Recent Labs   Lab 04/26/22  0910   *   K 4.2   CL 91*   CO2 22*   GLU 58*   BUN 38*   CREATININE 5.9*   CALCIUM 8.4*   PROT 8.7*   ALBUMIN 3.7   BILITOT 1.6*   ALKPHOS 107   AST 55*   ALT 26   ANIONGAP 18*   ESTGFRAFRICA 7.4*   EGFRNONAA 6.5*   , " CBC   Recent Labs   Lab 04/26/22  0910   WBC 7.60   HGB 12.0   HCT 38.2      , INR   Lab Results   Component Value Date    INR 2.3 04/26/2022    INR 1.5 04/18/2022    INR 1.3 04/11/2022   , Lipid Panel   Lab Results   Component Value Date    CHOL 157 10/13/2021    HDL 66 10/13/2021    LDLCALC 76.4 10/13/2021    TRIG 73 10/13/2021    CHOLHDL 42.0 10/13/2021   , Troponin   Recent Labs   Lab 04/26/22  0910   TROPONINI 0.081*   , A1C: No results for input(s): HGBA1C in the last 4320 hours. and All labs within the past 24 hours have been reviewed  Microbiology Results (last 7 days)     Procedure Component Value Units Date/Time    Blood culture [[id number]] Collected: 04/26/22 1225    Order Status: Sent Specimen: Blood from Peripheral, Forearm, Left Updated: 04/26/22 1259    Blood culture [[id number]] Collected: 04/26/22 1241    Order Status: Sent Specimen: Blood from Peripheral, Forearm, Left Updated: 04/26/22 1259    Stool culture **cannot be ordered stat** [[id number]]     Order Status: No result Specimen: Stool     Clostridium difficile EIA [[id number]]     Order Status: No result Specimen: Stool         CT Abdomen Pelvis  Without Contrast   Final Result      X-Ray Chest AP Portable   Final Result        X-Ray Chest AP Portable    Result Date: 4/26/2022  Reason: SOB Vomiting; Diarrhea; Abdominal Pain; Shortness of Breath FINDINGS: Portable chest at 837 compared with 3/31/2022 shows no change in enlarged cardiac silhouette size. Prominence of the main pulmonary outflow track may be related to patient rotation. Prior cardiac valve replacement again evident. Central pulmonary vascular prominence has not significantly changed. Right mid and lower lung zone pleural-parenchymal opacities remain similar to the prior exam. Diffuse pulmonary groundglass opacities bilaterally have slightly progressed. No pneumothorax. No acute osseous abnormality. Vascular stent noted in right upper arm, unchanged. IMPRESSION: 1. Unchanged  cardiomegaly and Central pulmonary vascular prominence. 2. Bilateral perihilar groundglass alveolar opacities suggest alveolar edema either related to heart failure or hypervolemia. 3. Persistent right basilar pleural-parenchymal opacity suggesting small right pleural effusion perhaps with more confluent alveolar edema or other alveolar consolidation versus atelectasis. Electronically signed by:  Leonel Aguilar MD  4/26/2022 8:42 AM CDT Workstation: [id number]    X-Ray Chest AP Portable    Result Date: 4/1/2022  EXAMINATION: XR CHEST AP PORTABLE CLINICAL HISTORY: chest pain; FINDINGS: Portable chest at 2308 03/03/2022 shows unchanged enlarged cardiac silhouette size with prior mitral valve replacement evident.  Mediastinal contours are normal. Bilateral pulmonary alveolar opacities show slight improved aeration since 03/03/2022.  Blunting of right costophrenic angle suggesting trace right pleural effusion unchanged.  Central pulmonary vascular prominence does persist, having slightly improved in the interval.  Diffuse interstitial opacities throughout both lungs unchanged.  No pneumothorax. No acute osseous abnormality identified.  Vascular stent in right upper arm partially visualized.     1. Unchanged cardiomegaly. 2. Improvement of pulmonary alveolar opacities and central pulmonary vascular prominence suggesting improving pulmonary edema either related to heart failure or hypervolemia.  Superimposed infectious or inflammatory pneumonia can also be considered. 3. Unchanged tiny right pleural effusion. Electronically signed by: Leonel Aguilar MD Date:    04/01/2022 Time:    07:00    Cardiac catheterization    Result Date: 4/11/2022  Procedure performed in the Invasive Lab  - See Procedure Log link below for nursing documentation  - See OpNote on Surgeries Tab for physician findings  - See Imaging Tab for radiologist dictation    CT Abdomen Pelvis  Without Contrast    Result Date: 4/26/2022  CMS MANDATED QUALITY DATA -  CT RADIATION  436 All CT scans at this facility utilize dose modulation, iterative reconstruction, and/or weight based dosing when appropriate to reduce radiation dose to as low as reasonably achievable. CLINICAL HISTORY: 76 years (1945) Female LLQ abdominal pain TECHNIQUE: CT ABDOMEN PELVIS WITHOUT IV CONTRAST. 221 images obtained. Axial CT images of the abdomen and pelvis were obtained from the dome of the diaphragm to the proximal thigh. CONTRAST: No IV contrast was administered COMPARISON: CT from April 1, 2022. FINDINGS:. Lower Thorax: Moderate right and trace left pleural effusion and adjacent atelectasis. The right pleural effusion is somewhat loculated at the right lung base but is unchanged from the previous exam. The heart is markedly enlarged in size with scattered coronary artery calcifications, mural left atrial calcification and annular mitral valve calcification. CT Abdomen: Liver: The liver is normal in size and imaging appearance. Gallbladder: Not seen. Biliary Tree: No intra or extrahepatic ductal dilation. Spleen: Within normal limits. Pancreas: The pancreas is normal. Adrenal Glands: The adrenal glands appear within normal limits. Kidneys: Small/atrophic with renal cortical thinning. Vasculature: Diffuse circumferential vascular calcifications are seen throughout the arteries of the abdomen and pelvis. Lymph nodes: No abdominal lymphadenopathy is seen. Intraperitoneal structures: Moderate volume of abdominopelvic ascites and mild diffuse body wall edema (anasarca). Bowel: Marked, sigmoid predominant diverticulosis, without focal diverticulitis identified on this exam. There is mild relative long segment thickening of the rectosigmoid colon. No finding of bowel obstruction, intra-abdominal free air or abscess. Abdominal wall: Mild diffuse of body wall edema (anasarca). Musculoskeletal: No acute osseous abnormality is identified . CT Pelvis: Bladder: The urinary bladder is within normal limits.  Reproductive Organs: The uterus is nonvisualized, surgically absent. Pelvic Lymph nodes: No lymphadenopathy. IMPRESSION: 1. Mild relative long segment bowel wall thickening in the rectosigmoid colon suggestive of mild colitis without finding of bowel obstruction, intra-abdominal free air or abscess. 2. Marked, sigmoid predominant diverticulosis without focal diverticulitis. 3. Moderate volume of abdominopelvic ascites. 4. Moderate right and small left pleural effusion, similar to the previous exam. 5. Numerous additional, and incidental findings as noted above. . Electronically signed by:  Alonso Bain MD  4/26/2022 11:19 AM CDT Workstation: [id number]    CTA Chest Abdomen Pelvis    Result Date: 4/1/2022  CLINICAL INDICATION:  concern for mesenteric ischemia TECHNIQUE: Axial CT imaging of the chest, abdomen and pelvis was performed. Sagittal and coronal reconstructions were performed for review. Performed with angiography protocol. 2-D and 3-D reconstructions CONTRAST: 70 mL Visipaque 320. COMPARISON: CT abdomen/pelvis January 24, 2022. FINDINGS: CT chest angiography: There is persistent loculated right pleural effusion similar to prior exam. There is a small layering left-sided pleural fluid collection. The heart is enlarged. There is no significant pericardial effusion. Previous mitral valve repair noted. The aorta is atherosclerotic but normal size without dissection. The proximal great vessels are disease. The brachiocephalic and left carotid demonstrate no significant stenosis at their origin. The left subclavian and likely has moderate stenosis at its origin. The pulmonary arteries are enlarged consistent with pulmonary arterial hypertension but no pulmonary emboli are demonstrated. Infiltrate is present in the right lung which could represent pneumonia. Basilar lung atelectatic changes are noted. There is a sclerotic lesion in the posterior left third rib. No other bone lesions are evident. Mild loss of  height of T7 and T4. CT abdomen/pelvis angiography: The liver is small and may be cirrhotic. The spleen is not enlarged. Both kidneys are atrophic. Ascites is present within the abdomen. The uterus is absent. The bladder is nearly contracted. Adrenal gland hyperplasia is present. The pancreas is grossly normal in appearance. No bowel distention. Extensive diverticulosis of the distal colon. Sigmoid diverticulitis may be present. The sigmoid colon appears thicker than previous exam. The abdominal aorta is normal size without stenosis or dissection. Severe bilateral renal arterial disease is present with high-grade stenoses. The iliac arteries are densely calcified and also demonstrated areas of at least mild to moderate stenosis. The left internal iliac artery is occluded. Moderate to high-grade stenosis of the proximal celiac artery. High-grade stenosis of the proximal superior mesenteric artery. The inferior mesenteric artery is patent. IMPRESSION: 1: Marked cardiomegaly. 2: Right greater than left pleural effusions. Right pleural effusion remains loculated. 3: Right lung infiltrate which could represent pneumonia. 4: Possible cirrhosis of the liver. 5: Severely atrophic kidneys. 6: Increased abdominal ascites over January 2022. 7: Diverticulosis of the sigmoid colon with thickening suggesting sigmoid diverticulitis without complication. 8: Extensive arterial vascular disease of the aorta and branch vessels with high-grade bilateral renal artery stenosis, severe stenosis of the superior mesenteric artery and moderate to high-grade stenosis of the celiac artery. RADIATION DOSE REDUCTION: All CT scans are performed using radiation dose reduction techniques, when applicable. Technical factors are evaluated and adjusted to ensure appropriate moderation of exposure. Electronically signed by:  Guido Sanchez MD  4/1/2022 12:55 AM CDT Workstation: [id number]    X-Ray KUB    Result Date: 4/1/2022  EXAMINATION: XR KUB  CLINICAL HISTORY: pain ; Unspecified abdominal pain FINDINGS: Supine abdomen compared with 04/14/2019 shows nonobstructive bowel gas pattern.  No intra-mass effect organomegaly.  Vascular calcifications are present.  Degenerative changes affect the spine.     No specific signs of obstruction or ileus. Electronically signed by: Leonel Aguilar MD Date:    04/01/2022 Time:    07:01    I personally reviewed and agree with the radiologist's findings      12 lead EKG reveals atrial fibrillation with  is a left axis deviation this good R-wave progression this LVH by voltage this ST depression and T-wave inversion in the lateral leads 1 and aVL and V6 QTc: 502 millisecond  ASSESSMENT & PLAN:   Griselda Neely is a 76 y.o. female admitted for    1. Acute on Chronic Diastolic HF-  -gentle IV diuresing  -accurate I's & O's  -2D echo  -cause consult    2.  Colitis with a recent history of C-Diff Colitis  -has been treated with oral vancomycin the past  -will hold of on zosyn for now changes on CT a most likely secondary from chronic inflammation, will monitor closely the patient is afebrile and does not have a leukocytosis    3. COPD with Chronic Hypoxic respiratory failure - on home O2  -continue BiPAP  -DuoNeb treatments  -pulmonary consult if not improved    4. Bioprosthetic Mitral Valve  - On Coumadin    5. ESRD with HD  -consult Nephrology  -nephrologist at bedside planning to do hemodialysis tomorrow  -electrolyte to stable no metabolic acidosis  -last hemodialysis testing was yesterday for her full time    6. Hematemesis  -hold Coumadin for now  -consult GI  -IV Protonix    7.  Chronic Pleural Effusion  -gentle IV diuresing  -monitor  -continue BiPAP    8.   History of calciphylaxis 6 and July 2017  -chronic and stable    9.  Chronic paroxysmal atrial fibrillation  -IV digoxin for rate control if needed  -hold Cardizem secondary to hypotension for now    10.  Tobacco use  -cessation discussed and encouraged    11.   Hypoglycemia  -given a half amp of D50 in the ED  -feet patient regularly  -Accu-Cheks Q 4 for now    12. Secondary hyperparathyroid disease  -continue binders with meals    13.  Chronic pancreatitis  -trend numbers  -trend lipase    14. Troponin elevation  -patient chest pain-free  - Trend   - mostly  s/t creatinine elevation    Critical care time spent 1 hour 15 minutes    High risk for clinical decline secondary to heart failure with hypotension, end-stage renal disease, possible upper GI bleed, valve replacement and atrial fib on Coumadin and multiple other comorbidities    DVT Prophylaxis: will be placed on Coumadin for DVT prophylaxis and will be advised to be as mobile as possible and sit in a chair as tolerated.   _______________________________________________________________  Face-to-Face encounter date: 04/26/2022  Encounter included review of the medical records, interviewing and examining the patient face-to-face, discussion with family and other health care providers including emergency medicine physician, admission orders, interpreting lab/test results and formulating a plan of care.   Medical Decision Making during this encounter was  [_] Low Complexity  [_] Moderate Complexity  [x] High Complexity  _________________________________________________________________________________    INPATIENT LIST OF MEDICATIONS     Current Facility-Administered Medications:     piperacillin-tazobactam 3.375 g in dextrose 5 % 50 mL IVPB (ready to mix system), 3.375 g, Intravenous, Once, Daren Ramirez MD    Current Outpatient Medications:     albuterol (ACCUNEB) 1.25 mg/3 mL Nebu, Take 1.25 mg by nebulization every 6 (six) hours as needed. Rescue HCS, Disp: , Rfl:     albuterol (PROVENTIL/VENTOLIN HFA) 90 mcg/actuation inhaler, Inhale 1 puff into the lungs every 4 (four) hours as needed for Wheezing. rescue, Disp: , Rfl:     aspirin (ECOTRIN) 81 MG EC tablet, Take 81 mg by mouth once daily., Disp: , Rfl:      cyanocobalamin (VITAMIN B-12) 1000 MCG tablet, Take 1 tablet (1,000 mcg total) by mouth once daily., Disp: 30 tablet, Rfl: 0    diltiaZEM (CARDIZEM) 30 MG tablet, Take 1 tablet (30 mg total) by mouth 4 (four) times daily before meals and nightly., Disp: 120 tablet, Rfl: 11    heparin sodium,porcine (HEPARIN, PORCINE,) 10,000 unit/mL injection, Heparin Sodium (Porcine) 1,000 Units/mL Systemic, Disp: , Rfl:     isosorbide mononitrate (IMDUR) 30 MG 24 hr tablet, Take 1 tablet (30 mg total) by mouth once daily., Disp: 90 tablet, Rfl: 3    LORazepam (ATIVAN) 0.5 MG tablet, Take 1 tablet (0.5 mg total) by mouth every 6 (six) hours as needed for Anxiety., Disp: 30 tablet, Rfl: 0    losartan (COZAAR) 25 MG tablet, Take 1 tablet (25 mg total) by mouth every evening., Disp: 90 tablet, Rfl: 1    metoprolol succinate (TOPROL-XL) 25 MG 24 hr tablet, Take 1 tablet by mouth nightly, Disp: 90 tablet, Rfl: 0    mupirocin (BACTROBAN) 2 % ointment, Apply topically 3 (three) times daily. for 14 days, Disp: 2 g, Rfl: 4    ondansetron (ZOFRAN) 4 MG tablet, Take 1 tablet (4 mg total) by mouth every 6 (six) hours as needed for Nausea., Disp: 12 tablet, Rfl: 0    oxyCODONE-acetaminophen (PERCOCET) 5-325 mg per tablet, Take 1 tablet by mouth every 4 (four) hours as needed for Pain., Disp: 10 tablet, Rfl: 0    pantoprazole (PROTONIX) 40 MG tablet, Take 1 tablet by mouth once daily, Disp: 90 tablet, Rfl: 0    Saccharomyces boulardii (FLORASTOR) 250 mg capsule, 1 capsule DAILY (route: oral), Disp: , Rfl:     VITAMIN B COMPLEX ORAL, 1 tablet DAILY (route: oral), Disp: , Rfl:     warfarin (COUMADIN) 2.5 MG tablet, Take 2.5 mg by mouth Daily., Disp: , Rfl:     calcium acetate,phosphat bind, (PHOSLO) 667 mg capsule, Take 667 mg by mouth 3 (three) times daily with meals., Disp: , Rfl:     montelukast (SINGULAIR) 10 mg tablet, Take 10 mg by mouth every evening., Disp: , Rfl:     Saccharomyces boulardii (FLORASTOR) 250 mg capsule, Take  1 capsule (250 mg total) by mouth with lunch., Disp: 30 capsule, Rfl: 0    sodium chloride 0.9% SolP 100 mL with iron sucrose 100 mg iron/5 mL Soln 100 mg, 50 mg., Disp: , Rfl:       Scheduled Meds:   piperacillin-tazobactam (ZOSYN) IVPB  3.375 g Intravenous Once     Continuous Infusions:  PRN Meds:.      Chery Salazar  [facility] Hospitalist NP  04/26/2022

## 2022-04-27 PROBLEM — I50.31 ACUTE DIASTOLIC CONGESTIVE HEART FAILURE: Status: ACTIVE | Noted: 2022-01-01

## 2022-04-27 NOTE — CARE UPDATE
04/27/22 0829   PRE-TX-O2   O2 Device (Oxygen Therapy) nasal cannula   $ Is the patient on Low Flow Oxygen? Yes   Flow (L/min) 4   SpO2 97 %   Pulse Oximetry Type Continuous   $ Pulse Oximetry - Multiple Charge Pulse Oximetry - Multiple   Pulse 98   Resp 19   Respiratory Evaluation   $ Care Plan Tech Time 15 min

## 2022-04-27 NOTE — PROGRESS NOTES
[facility] Medicine  Progress Note    Patient Name: Griselda Neely  MRN: [id number]  Patient Class: IP- Inpatient   Admission Date: 4/26/2022  Length of Stay: 1 days  Attending Physician: Robson Kaiser MD  Primary Care Provider: Denver Cerna NP        Subjective:     Principal Problem:Acute on chronic combined systolic and diastolic heart failure        HPI:  No notes on file    Overview/Hospital Course:  No notes on file    Interval History:  Patient was admitted with shortness of breath abdominal pain yesterday.  She is feeling much better today with volume removal with hemodialysis.  Blood pressure has improved significantly.  She is that she feels back to baseline.  She says she is functional independent at home.    Review of Systems   All other systems reviewed and are negative.  Objective:     Vital Signs (Most Recent):  Temp: 97.6 °F (36.4 °C) (04/27/22 1101)  Pulse: 89 (04/27/22 1401)  Resp: 18 (04/27/22 1401)  BP: 106/70 (04/27/22 1539)  SpO2: 97 % (04/27/22 1401) Vital Signs (24h Range):  Temp:  [97.4 °F (36.3 °C)-98 °F (36.7 °C)] 97.6 °F (36.4 °C)  Pulse:  [] 89  Resp:  [13-39] 18  SpO2:  [64 %-100 %] 97 %  BP: ()/(50-94) 106/70     Weight: 60.3 kg (133 lb)  Body mass index is 22.13 kg/m².    Intake/Output Summary (Last 24 hours) at 4/27/2022 1541  Last data filed at 4/27/2022 1245  Gross per 24 hour   Intake 830 ml   Output 3500 ml   Net -2670 ml      Physical Exam  Vitals reviewed.   Constitutional:       Appearance: Normal appearance. She is normal weight.   HENT:      Head: Normocephalic and atraumatic.      Nose: Nose normal.      Mouth/Throat:      Mouth: Mucous membranes are moist.   Eyes:      Conjunctiva/sclera: Conjunctivae normal.      Pupils: Pupils are equal, round, and reactive to light.   Cardiovascular:      Rate and Rhythm: Normal rate and regular rhythm.      Pulses: Normal pulses.      Heart sounds: Normal heart sounds. No murmur heard.     No friction rub. No gallop.   Pulmonary:      Effort: Pulmonary effort is normal.      Breath sounds: Rales present. No wheezing.   Abdominal:      General: Abdomen is flat. Bowel sounds are normal. There is no distension.      Palpations: Abdomen is soft.      Tenderness: There is no abdominal tenderness. There is no guarding.   Musculoskeletal:         General: No swelling. Normal range of motion.      Cervical back: Normal range of motion and neck supple.   Skin:     General: Skin is warm and dry.   Neurological:      General: No focal deficit present.      Mental Status: She is alert.   Psychiatric:         Mood and Affect: Mood normal.         Thought Content: Thought content normal.         Judgment: Judgment normal.       Significant Labs: All pertinent labs within the past 24 hours have been reviewed.    Significant Imaging: I have reviewed all pertinent imaging results/findings within the past 24 hours.      Assessment/Plan:      Acute diastolic congestive heart failure  Patient is identified as having Diastolic (HFpEF) heart failure that is Acute on chronic. CHF is currently controlled. Latest ECHO performed and demonstrates- Results for orders placed during the hospital encounter of 06/28/21    Echo    Interpretation Summary  · The left ventricle is normal in size with mild concentric hypertrophy and severely decreased systolic function.  · The estimated ejection fraction is 25%.  · Left ventricular diastolic dysfunction.  · Mild right ventricular enlargement with mildly reduced right ventricular systolic function.  · Moderate left atrial enlargement.  · Mild right atrial enlargement.  · Mild to moderate tricuspid regurgitation.  · Mild to moderate pulmonic regurgitation.  · There is a bioprosthetic mitral valve. There is mild insufficiency present. Prosthetic mitral valve is normal.  . Continue Beta Blocker and monitor clinical status closely. Monitor on telemetry. Patient is off CHF pathway.  Monitor  strict Is&Os and daily weights.  Place on fluid restriction of 1.5 L. Continue to stress to patient importance of self efficacy and  on diet for CHF. Last BNP reviewed- and noted below   Recent Labs   Lab 04/26/22  0910   BNP >4,500*   .    The patient was admitted with acute shortness of breath found to have CHF exacerbation due to volume overload.  She has need dialysis for volume removal.  She is on her home oxygen.      Atrial fibrillation  - continue rate control  - holding Coumadin given concerns for or hematemesis.      Gastrointestinal hemorrhage  Concern for hematemesis on admission  Holding Coumadin  Trend hemoglobin   gastroenterology consulted recommend conservative management for now  Continue Protonix    HTN (hypertension)  Holding home meds to hypotension      VTE Risk Mitigation (From admission, onward)         Ordered     IP VTE LOW RISK PATIENT  Once         04/26/22 1445     Place sequential compression device  Until discontinued         04/26/22 1445                Discharge Planning   SIENNA: 4/28/2022     Code Status: DNR   Is the patient medically ready for discharge?:     Reason for patient still in hospital (select all that apply): Treatment  Discharge Plan A: Home with family, Home Health                  Robson Kaiser MD  Department of Hospital Medicine   [facility]

## 2022-04-27 NOTE — SUBJECTIVE & OBJECTIVE
Interval History:  Patient was admitted with shortness of breath abdominal pain yesterday.  She is feeling much better today with volume removal with hemodialysis.  Blood pressure has improved significantly.  She is that she feels back to baseline.  She says she is functional independent at home.    Review of Systems   All other systems reviewed and are negative.  Objective:     Vital Signs (Most Recent):  Temp: 97.6 °F (36.4 °C) (04/27/22 1101)  Pulse: 89 (04/27/22 1401)  Resp: 18 (04/27/22 1401)  BP: 106/70 (04/27/22 1539)  SpO2: 97 % (04/27/22 1401) Vital Signs (24h Range):  Temp:  [97.4 °F (36.3 °C)-98 °F (36.7 °C)] 97.6 °F (36.4 °C)  Pulse:  [] 89  Resp:  [13-39] 18  SpO2:  [64 %-100 %] 97 %  BP: ()/(50-94) 106/70     Weight: 60.3 kg (133 lb)  Body mass index is 22.13 kg/m².    Intake/Output Summary (Last 24 hours) at 4/27/2022 1541  Last data filed at 4/27/2022 1245  Gross per 24 hour   Intake 830 ml   Output 3500 ml   Net -2670 ml      Physical Exam  Vitals reviewed.   Constitutional:       Appearance: Normal appearance. She is normal weight.   HENT:      Head: Normocephalic and atraumatic.      Nose: Nose normal.      Mouth/Throat:      Mouth: Mucous membranes are moist.   Eyes:      Conjunctiva/sclera: Conjunctivae normal.      Pupils: Pupils are equal, round, and reactive to light.   Cardiovascular:      Rate and Rhythm: Normal rate and regular rhythm.      Pulses: Normal pulses.      Heart sounds: Normal heart sounds. No murmur heard.    No friction rub. No gallop.   Pulmonary:      Effort: Pulmonary effort is normal.      Breath sounds: Rales present. No wheezing.   Abdominal:      General: Abdomen is flat. Bowel sounds are normal. There is no distension.      Palpations: Abdomen is soft.      Tenderness: There is no abdominal tenderness. There is no guarding.   Musculoskeletal:         General: No swelling. Normal range of motion.      Cervical back: Normal range of motion and neck supple.    Skin:     General: Skin is warm and dry.   Neurological:      General: No focal deficit present.      Mental Status: She is alert.   Psychiatric:         Mood and Affect: Mood normal.         Thought Content: Thought content normal.         Judgment: Judgment normal.       Significant Labs: All pertinent labs within the past 24 hours have been reviewed.    Significant Imaging: I have reviewed all pertinent imaging results/findings within the past 24 hours.

## 2022-04-27 NOTE — PLAN OF CARE
Important Message from Medicare was sign, explained and given to patient/caregiver on 04/27/2022 at 2:53pm     addressed any questions or concerns.    Important Message from Medicare document will be scanned into patient's medical record

## 2022-04-27 NOTE — CONSULTS
"[facility]  Consult Note  Nephrology    Patient Name: Griselda Neely  Age: female 76 y.o. 1945  Consult Requested By: Homer Carlson MD  Reason for Consult: ESRD on iHD Evaluation and Management.    SUBJECTIVE:     History of Present Illness:  Griselda Neely is a 76 y.o. female with pertinent medical history of atrial fibrillation, CHF, COPD on home oxygen treatment, recurrent/chronic pancreatitis, aortic stenosis s/p TAVR, recurrent tobacco use, and ESRD on iHD MWF followed by Dr. Ingram at [facility]. Patient recently discharged after hospital stay last month for colitis due to c.diff infection. Presented to ED today with multiple complaints including shortness of breath, recurrent abdominal pain, "loose non-bloody diarrhea", and nausea with dark colored emesis. In addition, patient is accompanied by family member today who reports patient is "weaker than usual" and drifts in and out of sleep. In ED, patient was noted to be hypotensive and tachycardic. Patient afebrile. Electrolytes reviewed and consistent with ESRD dx. Lipase mildly elevated at 167. BNP >4500. Troponin mildly elevated at 0.081. CXR with cardiomegaly and central vascular prominence. CT abdomen notable for presence of bilateral pleural effusions and abdominal ascites.     Patient is to be admitted to medicine for further evaluation of complaints. Patient last went to dialysis on 4/25/22 and tolerated treatment with UF completed to dry weight target of 58kg. Complaints of pain in LUE AVF during cannulation however no issues with vascular access flow or pressures when on iHD machine. Nephrology consulted for ESRD evaluation and management.     Review Of Systems:  Review of Systems   Constitutional: Positive for malaise/fatigue. Negative for chills, fever and weight loss.   HENT: Negative for congestion, ear pain and sore throat.    Eyes: Negative for blurred vision and pain.   Respiratory: Positive for cough and " shortness of breath. Negative for wheezing. Sputum production: related to exertion.    Cardiovascular: Positive for palpitations. Negative for chest pain, orthopnea and leg swelling.   Gastrointestinal: Positive for abdominal pain, diarrhea, nausea and vomiting. Negative for constipation and heartburn.   Genitourinary: Negative for dysuria and hematuria.   Musculoskeletal: Negative for back pain, joint pain and myalgias.   Skin: Negative for rash.   Neurological: Negative for tingling, sensory change, focal weakness, weakness and headaches.   Endo/Heme/Allergies: Negative for polydipsia.   Psychiatric/Behavioral: Negative for depression and substance abuse.         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    both legs    CHF (congestive heart failure)     Diverticulitis 11/2021    Encounter for blood transfusion 03/2016    Gout     Hemodialysis access, AV graft     Hypertension     Mitral valve regurgitation     Osteoarthritis     Pancreatitis     Peripheral vascular disease     Pneumonia 09/09/2017    Renal failure      Past Surgical History:   Procedure Laterality Date    ACHILLES TENDON SURGERY Right     ANGIOGRAPHY OF ARTERIOVENOUS SHUNT Right 1/7/2020    Procedure: Fistulogram with Possible Intervention;  Surgeon: Joseph Loyola MD;  Location: [facility] CATH/EP LAB;  Service: Cardiology;  Laterality: Right;    ANGIOGRAPHY OF LOWER EXTREMITY Left 3/18/2020    Procedure: Angiogram Extremity Unilateral;  Surgeon: Ali Khoobehi, MD;  Location: [facility] CATH/EP LAB;  Service: Cardiology;  Laterality: Left;    APPENDECTOMY      CARDIAC CATHETERIZATION  07/03/2017    CHOLECYSTECTOMY      COLONOSCOPY Left 10/4/2020    Procedure: COLONOSCOPY;  Surgeon: Jordan Kilpatrick MD;  Location: [facility] ENDO;  Service: Endoscopy;  Laterality: Left;    ESOPHAGOGASTRODUODENOSCOPY Left 8/17/2020    Procedure: EGD (ESOPHAGOGASTRODUODENOSCOPY);  Surgeon: Talat RICHARDS  MD Santhosh;  Location: [facility] ENDO;  Service: Endoscopy;  Laterality: Left;    ESOPHAGOGASTRODUODENOSCOPY Left 10/2/2020    Procedure: EGD (ESOPHAGOGASTRODUODENOSCOPY);  Surgeon: Talat Trevizo MD;  Location: [facility] ENDO;  Service: Endoscopy;  Laterality: Left;    ESOPHAGOGASTRODUODENOSCOPY N/A 6/29/2021    Procedure: EGD (ESOPHAGOGASTRODUODENOSCOPY);  Surgeon: Sudheer Brown III, MD;  Location: [facility] ENDO;  Service: Endoscopy;  Laterality: N/A;    FISTULOGRAM Bilateral 4/11/2022    Procedure: FISTULOGRAM;  Surgeon: Joseph Loyola MD;  Location: [facility] CATH/EP LAB;  Service: General;  Laterality: Bilateral;    heal surgery Right     HYSTERECTOMY      INSERTION OF STENT INTO PERIPHERAL VESSEL N/A 1/7/2020    Procedure: INSERTION, STENT, VESSEL, PERIPHERAL;  Surgeon: Joseph Loyola MD;  Location: [facility] CATH/EP LAB;  Service: Cardiology;  Laterality: N/A;    MITRAL VALVE REPLACEMENT      PARATHYROIDECTOMY      PARATHYROIDECTOMY  07/13/2017    PERCUTANEOUS TRANSLUMINAL ANGIOPLASTY OF ARTERIOVENOUS FISTULA N/A 1/7/2020    Procedure: PTA, AV FISTULA;  Surgeon: Joseph Loyola MD;  Location: [facility] CATH/EP LAB;  Service: Cardiology;  Laterality: N/A;    PERITONEAL CATHETER INSERTION      RENAL BIOPSY      SMALL BOWEL ENTEROSCOPY N/A 12/2/2020    Procedure: ENTEROSCOPY;  Surgeon: Sudheer Brown III, MD;  Location: [facility] ENDO;  Service: Endoscopy;  Laterality: N/A;    vocal cord nodule      WOUND DEBRIDEMENT Left 7/13/2020    Procedure: DEBRIDEMENT, WOUND;  Surgeon: Carlos Elam III, MD;  Location: [facility] OR;  Service: General;  Laterality: Left;     Family History   Problem Relation Age of Onset    Heart disease Sister     Early death Sister         heart as baby    Heart disease Maternal Grandfather     Diabetes Mother     Hypertension Mother     Heart failure Mother     Heart disease Mother     Arthritis Mother     Diabetes Father     Early death Sister         infant     Social History      Tobacco Use    Smoking status: Current Some Day Smoker     Packs/day: 0.50     Years: 45.00     Pack years: 22.50     Types: Cigarettes    Smokeless tobacco: Never Used   Substance Use Topics    Alcohol use: No    Drug use: No      Review of patient's allergies indicates:  No Known Allergies   Prior to Admission medications    Medication Sig Start Date End Date Taking? Authorizing Provider   albuterol (ACCUNEB) 1.25 mg/3 mL Nebu Take 1.25 mg by nebulization every 6 (six) hours as needed. Rescue  HCS   Yes Historical Provider   albuterol (PROVENTIL/VENTOLIN HFA) 90 mcg/actuation inhaler Inhale 1 puff into the lungs every 4 (four) hours as needed for Wheezing. rescue 12/11/20  Yes Historical Provider   aspirin (ECOTRIN) 81 MG EC tablet Take 81 mg by mouth once daily.   Yes Historical Provider   cyanocobalamin (VITAMIN B-12) 1000 MCG tablet Take 1 tablet (1,000 mcg total) by mouth once daily. 4/3/22 5/3/22 Yes Kulwinder Long MD   diltiaZEM (CARDIZEM) 30 MG tablet Take 1 tablet (30 mg total) by mouth 4 (four) times daily before meals and nightly. 10/14/21 10/14/22 Yes Gabe Davis MD   heparin sodium,porcine (HEPARIN, PORCINE,) 10,000 unit/mL injection Heparin Sodium (Porcine) 1,000 Units/mL Systemic 4/20/22 4/19/23 Yes Historical Provider   isosorbide mononitrate (IMDUR) 30 MG 24 hr tablet Take 1 tablet (30 mg total) by mouth once daily. 1/25/22  Yes Tosha Franklin PA-C   LORazepam (ATIVAN) 0.5 MG tablet Take 1 tablet (0.5 mg total) by mouth every 6 (six) hours as needed for Anxiety. 3/18/22 4/26/22 Yes Denver Cerna NP   losartan (COZAAR) 25 MG tablet Take 1 tablet (25 mg total) by mouth every evening. 1/25/22  Yes Tosha Franklin PA-C   metoprolol succinate (TOPROL-XL) 25 MG 24 hr tablet Take 1 tablet by mouth nightly 1/25/22  Yes Mary oD NP   mupirocin (BACTROBAN) 2 % ointment Apply topically 3 (three) times daily. for 14 days 4/13/22 4/27/22 Yes Denver Cerna, NP    ondansetron (ZOFRAN) 4 MG tablet Take 1 tablet (4 mg total) by mouth every 6 (six) hours as needed for Nausea. 1/23/22  Yes Erna Cartagena MD   oxyCODONE-acetaminophen (PERCOCET) 5-325 mg per tablet Take 1 tablet by mouth every 4 (four) hours as needed for Pain. 1/23/22  Yes Erna Cartagena MD   pantoprazole (PROTONIX) 40 MG tablet Take 1 tablet by mouth once daily 1/25/22  Yes Mary Do NP   Saccharomyces boulardii (FLORASTOR) 250 mg capsule 1 capsule DAILY (route: oral) 4/13/22  Yes Historical Provider   VITAMIN B COMPLEX ORAL 1 tablet DAILY (route: oral) 4/13/22  Yes Historical Provider   warfarin (COUMADIN) 2.5 MG tablet Take 2.5 mg by mouth Daily. 2/22/22  Yes Historical Provider   calcium acetate,phosphat bind, (PHOSLO) 667 mg capsule Take 667 mg by mouth 3 (three) times daily with meals.    Historical Provider   montelukast (SINGULAIR) 10 mg tablet Take 10 mg by mouth every evening.    Historical Provider   Saccharomyces boulardii (FLORASTOR) 250 mg capsule Take 1 capsule (250 mg total) by mouth with lunch. 4/3/22 5/3/22  Kulwinder Long MD   sodium chloride 0.9% SolP 100 mL with iron sucrose 100 mg iron/5 mL Soln 100 mg 50 mg. 4/4/22 4/3/23  Historical Provider   hydrALAZINE (APRESOLINE) 50 MG tablet Take 1 tablet (50 mg total) by mouth every 8 (eight) hours. 12/31/20 3/4/22  Kalie Neely MD              albuterol-ipratropium  3 mL Nebulization Q6H    calcium acetate(phosphat bind)  667 mg Oral TID WM    cyanocobalamin  1,000 mcg Oral Daily    diltiaZEM  30 mg Oral QID (AC & HS)    furosemide (LASIX) injection  20 mg Intravenous Once    metoprolol succinate  25 mg Oral Nightly    pantoprazole  40 mg Intravenous BID     dextrose 10%, dextrose 10%, HYDROcodone-acetaminophen, LORazepam, melatonin, ondansetron, sodium chloride 0.9%       OBJECTIVE:     Vital Signs (Most Recent)  Temp: 98 °F (36.7 °C) (04/26/22 1700)  Pulse: (!) 115 (04/26/22 1935)  Resp: 16 (04/26/22  1935)  BP: (!) 82/54 (04/26/22 1830)  SpO2: 100 % (04/26/22 1935)    Vital Signs Range (Last 24H):  Temp:  [98 °F (36.7 °C)-98.6 °F (37 °C)]   Pulse:  []   Resp:  [16-28]   BP: ()/(52-76)   SpO2:  [86 %-100 %]     I/O:  No intake or output data in the 24 hours ending 04/26/22 1942     Physical Exam:  Physical Exam  Vitals and nursing note reviewed.   Constitutional:       General: She is not in acute distress.     Appearance: She is ill-appearing and toxic-appearing. She is not diaphoretic.   HENT:      Head: Normocephalic and atraumatic.      Mouth/Throat:      Mouth: Mucous membranes are dry.      Pharynx: Oropharynx is clear. No oropharyngeal exudate or posterior oropharyngeal erythema.   Eyes:      Extraocular Movements: Extraocular movements intact.      Pupils: Pupils are equal, round, and reactive to light.   Cardiovascular:      Rate and Rhythm: Tachycardia present. Rhythm irregular.      Heart sounds: No murmur heard.    No friction rub.      Comments: Atrial fib without RVR  Pulmonary:      Effort: Respiratory distress (mild ) present.      Breath sounds: No wheezing or rales.      Comments: On 5L nasal cannula  Abdominal:      General: There is no distension.      Tenderness: There is abdominal tenderness (LLQ). There is no rebound.   Musculoskeletal:         General: No swelling.      Cervical back: Normal range of motion.      Right lower leg: No edema.      Left lower leg: No edema.   Lymphadenopathy:      Cervical: No cervical adenopathy.   Skin:     General: Skin is dry.      Coloration: Skin is not jaundiced.   Neurological:      General: No focal deficit present.      Mental Status: She is oriented to person, place, and time.   Psychiatric:         Mood and Affect: Mood normal.         Behavior: Behavior normal.     Access: LUE AVF with good systolic thrill, + bruit on ascultation, normal pulse augmentation      Laboratory:  Recent Labs   Lab 04/26/22  0910   *   K 4.2   CL 91*    CO2 22*   BUN 38*   CREATININE 5.9*   GLU 58*   CALCIUM 8.4*       Recent Labs   Lab 04/26/22  0910   WBC 7.60   HGB 12.0   HCT 38.2          Anemia Evaluation  Lab Results   Component Value Date    HGB 12.0 04/26/2022    MCV 93 04/26/2022    FERRITIN 906 (H) 04/02/2022    FESATURATED 33 04/02/2022    FOLATE 6.1 04/02/2022    EIOGJNFE24 444 04/02/2022       CKD-MBD Evaluation  Lab Results   Component Value Date    SJERWTPU12RS <13 (L) 05/30/2016    CALCIUM 8.4 (L) 04/26/2022    PHOS 4.3 04/02/2022    MG 1.8 04/02/2022    CO2 22 (L) 04/26/2022    ALBUMIN 3.7 04/26/2022       Imaging:  Imaging Results          CT Abdomen Pelvis  Without Contrast (Final result)  Result time 04/26/22 11:19:00    Final result by Alonso Bain MD (04/26/22 11:19:00)                 Narrative:    CMS MANDATED QUALITY DATA - CT RADIATION  436    All CT scans at this facility utilize dose modulation, iterative reconstruction, and/or weight based dosing when appropriate to reduce radiation dose to as low as reasonably achievable.    CLINICAL HISTORY:  76 years (1945) Female LLQ abdominal pain    TECHNIQUE:  CT ABDOMEN PELVIS WITHOUT IV CONTRAST. 221 images obtained. Axial CT images of the abdomen and pelvis were obtained from the dome of the diaphragm to the proximal thigh.    CONTRAST:  No IV contrast was administered    COMPARISON:  CT from April 1, 2022.    FINDINGS:.  Lower Thorax:  Moderate right and trace left pleural effusion and adjacent atelectasis. The right pleural effusion is somewhat loculated at the right lung base but is unchanged from the previous exam. The heart is markedly enlarged in size with scattered coronary artery calcifications, mural left atrial calcification and annular mitral valve calcification.    CT Abdomen:  Liver: The liver is normal in size and imaging appearance.  Gallbladder: Not seen.  Biliary Tree: No intra or extrahepatic ductal dilation.  Spleen: Within normal limits.  Pancreas: The  pancreas is normal.  Adrenal Glands: The adrenal glands appear within normal limits.  Kidneys: Small/atrophic with renal cortical thinning.  Vasculature: Diffuse circumferential vascular calcifications are seen throughout the arteries of the abdomen and pelvis.  Lymph nodes: No abdominal lymphadenopathy is seen.  Intraperitoneal structures: Moderate volume of abdominopelvic ascites and mild diffuse body wall edema (anasarca).  Bowel: Marked, sigmoid predominant diverticulosis, without focal diverticulitis identified on this exam. There is mild relative long segment thickening of the rectosigmoid colon. No finding of bowel obstruction, intra-abdominal free air or abscess.  Abdominal wall: Mild diffuse of body wall edema (anasarca).  Musculoskeletal: No acute osseous abnormality is identified .    CT Pelvis:  Bladder: The urinary bladder is within normal limits.  Reproductive Organs: The uterus is nonvisualized, surgically absent.  Pelvic Lymph nodes: No lymphadenopathy.    IMPRESSION:  1. Mild relative long segment bowel wall thickening in the rectosigmoid colon suggestive of mild colitis without finding of bowel obstruction, intra-abdominal free air or abscess.  2. Marked, sigmoid predominant diverticulosis without focal diverticulitis.  3. Moderate volume of abdominopelvic ascites.  4. Moderate right and small left pleural effusion, similar to the previous exam.  5. Numerous additional, and incidental findings as noted above.                    .    Electronically signed by:  Alonso Bain MD  4/26/2022 11:19 AM CDT Workstation: [id number]                             X-Ray Chest AP Portable (Final result)  Result time 04/26/22 08:42:50    Final result by Leonel Aguilar MD (04/26/22 08:42:50)                 Narrative:    Reason: SOB Vomiting; Diarrhea; Abdominal Pain; Shortness of Breath    FINDINGS:  Portable chest at 837 compared with 3/31/2022 shows no change in enlarged cardiac silhouette size. Prominence of  the main pulmonary outflow track may be related to patient rotation. Prior cardiac valve replacement again evident.    Central pulmonary vascular prominence has not significantly changed. Right mid and lower lung zone pleural-parenchymal opacities remain similar to the prior exam. Diffuse pulmonary groundglass opacities bilaterally have slightly progressed. No pneumothorax.    No acute osseous abnormality. Vascular stent noted in right upper arm, unchanged.    IMPRESSION:    1. Unchanged cardiomegaly and Central pulmonary vascular prominence.  2. Bilateral perihilar groundglass alveolar opacities suggest alveolar edema either related to heart failure or hypervolemia.  3. Persistent right basilar pleural-parenchymal opacity suggesting small right pleural effusion perhaps with more confluent alveolar edema or other alveolar consolidation versus atelectasis.    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 difficile colitis [A04.72]  Yes    ESRD (end stage renal disease) on HD M,W, F [N18.6]  Yes     Chronic    Tobacco dependence [F17.200]  Yes     Chronic      Resolved Hospital Problems   No resolved problems to display.     #ESRD on iHD  -MWF outpatient schedule. Completed dialysis to clinic recorded EDW (58kg) on 4/25/22  -CXR with central vascular prominence in addition has recurrent  pleural effusions and ascites on exam  -Patient refused iHD today with family member present to witness. Of note, patient was stable on nasal cannula and mentation at baseline per my assessment at time of ED visit.   -Will set up patient for dialysis in AM. Will attempt UF to goal. If does not tolerate patient may need vasopressors in ICU setting to facilitate meaningful UF.  -agree with sepsis evaluation    #shortness of breath  -need to consider all etiologies; consider decompensated COPD. Agree with bipap as tolerates  -also in atrial fibrillation; BP improving with rate control  -needs smoking cessation  -on home O2 at baseline  -UF for recurrent pleural effusions as tolerated    #abdominal ascites  -given recurrent colitis events, need to consider pursing IR guided paracentesis to evaluate abdominal fluid. May have SBP or bloody ascites...  -recommend fluid albumin, total protein, cell count + differential, and cultures.  -abx per [facility]  -UF as tolerated  -GI consulted    #atrial fibrillation  -see above. On metoprolol QD and diltiazem QID    #Hypotension  -possible IVVD from diarrhea/emesis  -also in a fib  -cultures ordered to evaluate for sepsis  -low threshold for abx in this patient with significant abdominal history    #MBD  -mild hypocalcemia; will adjust with iHD bath  -f/u phos; phos-lo with meals  - and vit D 30.4 at clinic labs on 4/11/22.       Thank you for allowing us to consult on Griselda Neely.  Our group will follow-up.    Ramiro Butler MD  [facility]  04/26/2022

## 2022-04-27 NOTE — CONSULTS
"[facility]  Adult Nutrition   Consult Note (Initial Assessment)     SUMMARY     Recommendations/Interventions:    Recommendation/Intervention: 1. Hyperphosphatemia (6.0)-recommend phosphorus binders with meals. 2. Continue current diet as tolerated, encourage intake. 3. Added Nepro TID (1260 kcals 57 g protein) to assist in meeting needs when meal intake is insufficient.  Goals: 1. Phosphate binders added and phosphorus to trend towards target range. 2. Patient to meet at least 75% of estimated needs through meal and supplement intake.  Nutrition Goal Status: new    Dietitian Rounds Brief:  · Consult. Patient presented with SOB, abdominal pain, diarrhea, nausea, and coffee ground emesis. BNP > 4500. Admitted 2' CHF exacerbation. Patient with minimal intake of breakfast. Added ONS. Will provide diet education once family is available-patient asleep during rounds. Will continue to monitor intake, labs, and plan of care.  Reason for Assessment  Reason For Assessment: consult  Diagnosis: cardiac disease  Relevant Medical History: tobacco dependence, ESRD_on HD, COPD, DNR, mitral valve replacement, CHF, HTN, HLD, PVD, malnutrition, diverticulosis, PND, CAD, GERD,  Interdisciplinary Rounds: attended    Nutrition Risk Screen  Nutrition Risk Screen: no indicators present    Nutrition/Diet History  Food Allergies: NKFA  Factors Affecting Nutritional Intake: None identified at this time    Anthropometrics  Temp: 97.9 °F (36.6 °C)  Height: 5' 5" (165.1 cm)  Height (inches): 65 in  Weight: 60.3 kg (133 lb)  Weight (lb): 133 lb  Ideal Body Weight (IBW), Female: 125 lb  % Ideal Body Weight, Female (lb): 106.4 %  BMI (Calculated): 22.1  BMI Grade: 18.5-24.9 - normal     Weight History:  Wt Readings from Last 10 Encounters:   04/26/22 60.3 kg (133 lb)   04/13/22 58.7 kg (129 lb 6.6 oz)   04/08/22 58.1 kg (128 lb)   04/01/22 58.4 kg (128 lb 12 oz)   03/18/22 56.7 kg (125 lb)   03/03/22 59.3 kg (130 lb 11.7 oz) "   01/24/22 57.7 kg (127 lb 5.1 oz)   01/22/22 54.5 kg (120 lb 2.4 oz)   11/20/21 62.7 kg (138 lb 3.7 oz)   10/26/21 59.9 kg (132 lb)     Lab/Procedures/Meds: Pertinent Labs Reviewed  Clinical Chemistry:  Recent Labs   Lab 04/26/22  0910 04/27/22  0503   * 134*   K 4.2 4.5   CL 91* 91*   CO2 22* 25   GLU 58* 78   BUN 38* 44*   CREATININE 5.9* 6.8*   CALCIUM 8.4* 8.0*   PROT 8.7* 7.6   ALBUMIN 3.7 3.2*   BILITOT 1.6* 1.6*   ALKPHOS 107 103   AST 55* 64*   ALT 26 30   ANIONGAP 18* 18*   ESTGFRAFRICA 7.4* 6.3*   EGFRNONAA 6.5* 5.4*   MG  --  1.7   PHOS  --  6.0*   LIPASE 167* 150*     CBC:   Recent Labs   Lab 04/27/22  0503   WBC 4.62   RBC 3.78*   HGB 11.2*   HCT 35.7*      MCV 94   MCH 29.6   MCHC 31.4*     Lipid Panel:  No results for input(s): CHOL, HDL, LDLCALC, TRIG, CHOLHDL in the last 168 hours.  Cardiac Profile:  Recent Labs   Lab 04/26/22  0910 04/26/22  1733   BNP >4,500*  --    TROPONINI 0.081* 0.071*     Medications: Pertinent Medications reviewed  Scheduled Meds:   albuterol-ipratropium  3 mL Nebulization Q6H    calcium acetate(phosphat bind)  667 mg Oral TID WM    cyanocobalamin  1,000 mcg Oral Daily    diltiaZEM  30 mg Oral QID (AC & HS)    furosemide (LASIX) injection  20 mg Intravenous Once    metoprolol succinate  25 mg Oral Nightly    midodrine  2.5 mg Oral TID    pantoprazole  40 mg Intravenous BID     Continuous Infusions:  PRN Meds:.dextrose 10%, dextrose 10%, HYDROcodone-acetaminophen, LORazepam, melatonin, metoprolol, ondansetron, sodium chloride 0.9%    Antibiotics (From admission, onward)            None        Estimated/Assessed Needs  Weight Used For Calorie Calculations: 60.3 kg (132 lb 15 oz)  Energy Calorie Requirements (kcal): 7493-6152 kcals/day (25-30 kcals/kg)  Energy Need Method: Kcal/kg  Protein Requirements: 73-91 g/day (1.2-1.5 g/kg 2' ESRD on HD)  Weight Used For Protein Calculations: 60.3 kg (132 lb 15 oz)  Fluid Requirements (mL): UOP + 1000 mL or per  MD  RDA Method (mL): 1508     Nutrition Prescription Ordered    Current Diet Order: 2 g Low sodium; Renal Diet    Evaluation of Received Nutrient/Fluid Intake    Energy Calories Required: not meeting needs  Protein Required: not meeting needs  Fluid Required: meeting needs  Tolerance: tolerating  % Intake of Estimated Energy Needs: 0 - 25 %  % Meal Intake: 0 - 25 %    Intake/Output Summary (Last 24 hours) at 4/27/2022 0731  Last data filed at 4/27/2022 0021  Gross per 24 hour   Intake 250 ml   Output --   Net 250 ml      Nutrition Risk    Level of Risk/Frequency of Follow-up: high   Monitor and Evaluation    Food and Nutrient Intake: energy intake, food and beverage intake  Food and Nutrient Adminstration: diet order  Physical Activity and Function: nutrition-related ADLs and IADLs, factors affecting access to physical activity  Anthropometric Measurements: weight, weight change, body mass index  Biochemical Data, Medical Tests and Procedures: electrolyte and renal panel, gastrointestinal profile, glucose/endocrine profile, inflammatory profile, lipid profile  Nutrition-Focused Physical Findings: overall appearance     Nutrition Follow-Up    RD Follow-up?: Yes  Chelo Asif RD 04/27/2022 9:37 AM

## 2022-04-27 NOTE — CARE UPDATE
04/27/22 1300   Patient Assessment/Suction   All Lung Fields Breath Sounds equal bilaterally;diminished   PRE-TX-O2   O2 Device (Oxygen Therapy) nasal cannula   $ Is the patient on Low Flow Oxygen? Yes   Flow (L/min) 2   Pulse Oximetry Type Continuous   $ Pulse Oximetry - Multiple Charge Pulse Oximetry - Multiple   Aerosol Therapy   $ Aerosol Therapy Charges On hold  (High HR)   Respiratory Evaluation   $ Care Plan Tech Time 15 min

## 2022-04-27 NOTE — PROGRESS NOTES
04/27/22 1245   Post-Hemodialysis Assessment   Rinseback Volume (mL) 250 mL   Blood Volume Processed (Liters) 68.2 L   Dialyzer Clearance Lightly streaked   Duration of Treatment 180 minutes   Additional Fluid Intake (mL) 500 mL   Total UF (mL) 3500 mL   Net Fluid Removal 3000   Patient Response to Treatment tolerated well   Post-Treatment Weight 57.2 kg (126 lb 1.7 oz)   Treatment Weight Change -2.8   Arterial bleeding stop time (min) 15 min   Venous bleeding stop time (min) 12 min   Post-Hemodialysis Comments prolonged bleeding post dialysis, hemostasis achieved, dressing CDI

## 2022-04-27 NOTE — ASSESSMENT & PLAN NOTE
Patient is identified as having Diastolic (HFpEF) heart failure that is Acute on chronic. CHF is currently controlled. Latest ECHO performed and demonstrates- Results for orders placed during the hospital encounter of 06/28/21    Echo    Interpretation Summary  · The left ventricle is normal in size with mild concentric hypertrophy and severely decreased systolic function.  · The estimated ejection fraction is 25%.  · Left ventricular diastolic dysfunction.  · Mild right ventricular enlargement with mildly reduced right ventricular systolic function.  · Moderate left atrial enlargement.  · Mild right atrial enlargement.  · Mild to moderate tricuspid regurgitation.  · Mild to moderate pulmonic regurgitation.  · There is a bioprosthetic mitral valve. There is mild insufficiency present. Prosthetic mitral valve is normal.  . Continue Beta Blocker and monitor clinical status closely. Monitor on telemetry. Patient is off CHF pathway.  Monitor strict Is&Os and daily weights.  Place on fluid restriction of 1.5 L. Continue to stress to patient importance of self efficacy and  on diet for CHF. Last BNP reviewed- and noted below   Recent Labs   Lab 04/26/22  0910   BNP >4,500*   .    The patient was admitted with acute shortness of breath found to have CHF exacerbation due to volume overload.  She has need dialysis for volume removal.  She is on her home oxygen.

## 2022-04-27 NOTE — PROGRESS NOTES
"[facility]  Progress Note  Nephrology    Patient Name: Griselda Neely  Age: female 76 y.o. 1945  Consult Requested By: Robson Kaiser MD  Reason for Consult: ESRD on iHD Evaluation and Management.    SUBJECTIVE:     History of Present Illness:  Griselda Neely is a 76 y.o. female with pertinent medical history of atrial fibrillation, CHF, COPD on home oxygen treatment, recurrent/chronic pancreatitis, aortic stenosis s/p TAVR, recurrent tobacco use, and ESRD on iHD MWF followed by Dr. Ingram at [facility]. Patient recently discharged after hospital stay last month for colitis due to c.diff infection. Presented to ED today with multiple complaints including shortness of breath, recurrent abdominal pain, "loose non-bloody diarrhea", and nausea with dark colored emesis. In addition, patient is accompanied by family member today who reports patient is "weaker than usual" and drifts in and out of sleep. In ED, patient was noted to be hypotensive and tachycardic. Patient afebrile. Electrolytes reviewed and consistent with ESRD dx. Lipase mildly elevated at 167. BNP >4500. Troponin mildly elevated at 0.081. CXR with cardiomegaly and central vascular prominence. CT abdomen notable for presence of bilateral pleural effusions and abdominal ascites.     Patient is to be admitted to medicine for further evaluation of complaints. Patient last went to dialysis on 4/25/22 and tolerated treatment with UF completed to dry weight target of 58kg. Complaints of pain in LUE AVF during cannulation however no issues with vascular access flow or pressures when on iHD machine. Nephrology consulted for ESRD evaluation and management.     Interval history:  Tolerated iHD this AM with 3L Uf goal achieved  Respiratory complaints improved. Back to baseline level of home O2  Patient refusing endoscopy. Wants to go home.      OBJECTIVE:     Vital Signs (Most Recent)  Temp: 97.4 °F (36.3 °C) (04/27/22 1501)  Pulse: " (!) 134 (04/27/22 1601)  Resp: (!) 28 (04/27/22 1601)  BP: 115/73 (04/27/22 1601)  SpO2: 98 % (04/27/22 1601)    Vital Signs Range (Last 24H):  Temp:  [97.4 °F (36.3 °C)-97.9 °F (36.6 °C)]   Pulse:  []   Resp:  [13-39]   BP: ()/(50-94)   SpO2:  [64 %-100 %]     I/O:    Intake/Output Summary (Last 24 hours) at 4/27/2022 1728  Last data filed at 4/27/2022 1245  Gross per 24 hour   Intake 830 ml   Output 3500 ml   Net -2670 ml        Physical Exam:  Physical Exam  Vitals and nursing note reviewed.   Constitutional:       General: She is not in acute distress.     Appearance: She is ill-appearing. She is not toxic-appearing or diaphoretic.   HENT:      Head: Normocephalic and atraumatic.      Mouth/Throat:      Mouth: Mucous membranes are dry.      Pharynx: Oropharynx is clear. No oropharyngeal exudate or posterior oropharyngeal erythema.   Eyes:      Extraocular Movements: Extraocular movements intact.      Pupils: Pupils are equal, round, and reactive to light.   Cardiovascular:      Rate and Rhythm: Tachycardia present. Rhythm irregular.      Heart sounds: No murmur heard.    No friction rub.      Comments: Atrial fib without RVR  Pulmonary:      Effort: No respiratory distress (mild ).      Breath sounds: No wheezing or rales.      Comments: On 5L nasal cannula  Abdominal:      General: There is no distension.      Tenderness: There is no abdominal tenderness (LLQ). There is no rebound.   Musculoskeletal:         General: No swelling.      Cervical back: Normal range of motion.      Right lower leg: No edema.      Left lower leg: No edema.   Lymphadenopathy:      Cervical: No cervical adenopathy.   Skin:     General: Skin is dry.      Coloration: Skin is not jaundiced.   Neurological:      General: No focal deficit present.      Mental Status: She is oriented to person, place, and time.   Psychiatric:         Mood and Affect: Mood normal.         Behavior: Behavior normal.     Access: E AVF with good  systolic thrill, + bruit on ascultation, normal pulse augmentation      Laboratory:  Recent Labs   Lab 04/26/22  0910 04/27/22  0503   * 134*   K 4.2 4.5   CL 91* 91*   CO2 22* 25   BUN 38* 44*   CREATININE 5.9* 6.8*   GLU 58* 78   CALCIUM 8.4* 8.0*   PHOS  --  6.0*       Recent Labs   Lab 04/26/22  0910 04/27/22  0503   WBC 7.60 4.62   HGB 12.0 11.2*   HCT 38.2 35.7*    150       Anemia Evaluation  Lab Results   Component Value Date    HGB 11.2 (L) 04/27/2022    MCV 94 04/27/2022    FERRITIN 906 (H) 04/02/2022    FESATURATED 33 04/02/2022    FOLATE 6.1 04/02/2022    [id number] 444 04/02/2022       CKD-MBD Evaluation  Lab Results   Component Value Date    MZENZHIQ71WA <13 (L) 05/30/2016    CALCIUM 8.0 (L) 04/27/2022    PHOS 6.0 (H) 04/27/2022    MG 1.7 04/27/2022    CO2 25 04/27/2022    ALBUMIN 3.2 (L) 04/27/2022       Imaging:  Imaging Results          CT Abdomen Pelvis  Without Contrast (Final result)  Result time 04/26/22 11:19:00    Final result by Alonso Bain MD (04/26/22 11:19:00)                 Narrative:    CMS MANDATED QUALITY DATA - CT RADIATION  436    All CT scans at this facility utilize dose modulation, iterative reconstruction, and/or weight based dosing when appropriate to reduce radiation dose to as low as reasonably achievable.    CLINICAL HISTORY:  76 years (1945) Female LLQ abdominal pain    TECHNIQUE:  CT ABDOMEN PELVIS WITHOUT IV CONTRAST. 221 images obtained. Axial CT images of the abdomen and pelvis were obtained from the dome of the diaphragm to the proximal thigh.    CONTRAST:  No IV contrast was administered    COMPARISON:  CT from April 1, 2022.    FINDINGS:.  Lower Thorax:  Moderate right and trace left pleural effusion and adjacent atelectasis. The right pleural effusion is somewhat loculated at the right lung base but is unchanged from the previous exam. The heart is markedly enlarged in size with scattered coronary artery calcifications, mural left atrial  calcification and annular mitral valve calcification.    CT Abdomen:  Liver: The liver is normal in size and imaging appearance.  Gallbladder: Not seen.  Biliary Tree: No intra or extrahepatic ductal dilation.  Spleen: Within normal limits.  Pancreas: The pancreas is normal.  Adrenal Glands: The adrenal glands appear within normal limits.  Kidneys: Small/atrophic with renal cortical thinning.  Vasculature: Diffuse circumferential vascular calcifications are seen throughout the arteries of the abdomen and pelvis.  Lymph nodes: No abdominal lymphadenopathy is seen.  Intraperitoneal structures: Moderate volume of abdominopelvic ascites and mild diffuse body wall edema (anasarca).  Bowel: Marked, sigmoid predominant diverticulosis, without focal diverticulitis identified on this exam. There is mild relative long segment thickening of the rectosigmoid colon. No finding of bowel obstruction, intra-abdominal free air or abscess.  Abdominal wall: Mild diffuse of body wall edema (anasarca).  Musculoskeletal: No acute osseous abnormality is identified .    CT Pelvis:  Bladder: The urinary bladder is within normal limits.  Reproductive Organs: The uterus is nonvisualized, surgically absent.  Pelvic Lymph nodes: No lymphadenopathy.    IMPRESSION:  1. Mild relative long segment bowel wall thickening in the rectosigmoid colon suggestive of mild colitis without finding of bowel obstruction, intra-abdominal free air or abscess.  2. Marked, sigmoid predominant diverticulosis without focal diverticulitis.  3. Moderate volume of abdominopelvic ascites.  4. Moderate right and small left pleural effusion, similar to the previous exam.  5. Numerous additional, and incidental findings as noted above.                    .    Electronically signed by:  Alonso Bain MD  4/26/2022 11:19 AM CDT Workstation: [id number]                             X-Ray Chest AP Portable (Final result)  Result time 04/26/22 08:42:50    Final result by Leonel  MD Lauren (04/26/22 08:42:50)                 Narrative:    Reason: SOB Vomiting; Diarrhea; Abdominal Pain; Shortness of Breath    FINDINGS:  Portable chest at 837 compared with 3/31/2022 shows no change in enlarged cardiac silhouette size. Prominence of the main pulmonary outflow track may be related to patient rotation. Prior cardiac valve replacement again evident.    Central pulmonary vascular prominence has not significantly changed. Right mid and lower lung zone pleural-parenchymal opacities remain similar to the prior exam. Diffuse pulmonary groundglass opacities bilaterally have slightly progressed. No pneumothorax.    No acute osseous abnormality. Vascular stent noted in right upper arm, unchanged.    IMPRESSION:    1. Unchanged cardiomegaly and Central pulmonary vascular prominence.  2. Bilateral perihilar groundglass alveolar opacities suggest alveolar edema either related to heart failure or hypervolemia.  3. Persistent right basilar pleural-parenchymal opacity suggesting small right pleural effusion perhaps with more confluent alveolar edema or other alveolar consolidation versus atelectasis.    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 difficile colitis [A04.72]  Yes    ESRD (end stage renal disease) on HD M,W, F [N18.6]  Yes     Chronic    HTN (hypertension) [I10]  Yes     Chronic      Resolved Hospital Problems   No resolved problems to display.     #ESRD on iHD  -MWF outpatient schedule. Completed dialysis to clinic recorded EDW (58kg) on 4/25/22  -CXR with central vascular prominence in addition has recurrent pleural effusions and ascites on exam  -tolerated iHD today with 3L Uf goal obtained.   -SOB complaint improved  -next iHD tentatively planned for Friday  -can f/u at dialysis unit if discharged prior to Friday    #shortness of breath  -a fib rate controlled  -on baseline level of home O2  -improved after UF/mgmt of HFpEF    #abdominal ascites  -given recurrent colitis events, need to consider pursing IR guided paracentesis to evaluate abdominal fluid. May have SBP or bloody ascites...  -recommend fluid albumin, total protein, cell count + differential, and cultures.  -abx per [facility]  -UF as tolerated  -GI consulted; paracentesis contraindicated while patient is on anticoagulation    #atrial fibrillation  -see above. On metoprolol QD and diltiazem QID    #Hypotension  -likely related to a fib + HFpEF as was able to tolerate UF today  -cultures ordered to evaluate for sepsis  -low threshold for abx in this patient with significant abdominal history    #MBD  -mild hypocalcemia; will adjust with iHD bath  -f/u phos; phos-lo with meals  - and vit D 30.4 at clinic labs on 4/11/22.       Thank you for allowing us to consult on Griselda Neely.  Our group will follow-up.    Ramiro Butler MD  [facility] Nephrology  04/27/2022

## 2022-04-27 NOTE — NURSING
"2100-Pts HR 130s Afib, B/P 76/61 , after repositioning and raising lower extremities b/p is 88/67. metoprolol 25mg and cardizem 30mg was held per Dr. Carlson.  2200-Midodrine 2.5mg was ordered and given  0015-BG 65, D10 bolus 125 ML given  0215- afib, metoprolol 2.5mg IV given  0300-BG 63, D10 bolus 125 ML given. Pt states " I feel weak" while adamant on getting out of the bed. Safety concerns were expressed and pt was educated on the safety needs of staying in the bed. Bed alarm on. Call light in reach, along with bedside table.   0335- , B/P 92/50    "

## 2022-04-27 NOTE — PLAN OF CARE
Problem: Adult Inpatient Plan of Care  Goal: Optimal Comfort and Wellbeing  Outcome: Ongoing, Progressing     Problem: Adult Inpatient Plan of Care  Goal: Readiness for Transition of Care  Outcome: Ongoing, Progressing     Problem: Fluid Imbalance (Pneumonia)  Goal: Fluid Balance  Outcome: Ongoing, Progressing     Problem: Fall Injury Risk  Goal: Absence of Fall and Fall-Related Injury  Outcome: Ongoing, Progressing     Problem: Hemodynamic Instability (Hemodialysis)  Goal: Effective Tissue Perfusion  Outcome: Ongoing, Progressing     Problem: Infection (Hemodialysis)  Goal: Absence of Infection Signs and Symptoms  Outcome: Ongoing, Progressing

## 2022-04-27 NOTE — PLAN OF CARE
[facility]  Initial Discharge Assessment       Primary Care Provider: Denver Cerna NP    Admission Diagnosis: Acute on chronic heart failure with reduced ejection fraction and diastolic dysfunction [I50.43]    Admission Date: 4/26/2022  Expected Discharge Date: 4/28/2022    Discharge Barriers Identified: None     Assessment completed at bedside.  Advanced directive not addressed at this time.  Patient intends to discharge home with [facility] home health for .     Payor: [employer] MANAGED MEDICARE / Plan: [employer] MEDICARE HMO / Product Type: Capitation /     Extended Emergency Contact Information  Primary Emergency Contact: Aide Watson  Address: [address] [place] [address] [place]  Mobile Phone: [phone]  Relation: Sister  Secondary Emergency Contact: WinfieldElvi  Address: UNKNOWN   [place]  Mobile Phone: [phone]  Relation: Sister    Discharge Plan A: Home with family, Home Health  Discharge Plan B: Home, Home with family, Home Health      [facility] [address] [place], [place] - [address]  [address]  [facility] [address]  Phone: [phone] Fax: [phone]    [facility] - [facility] [address] [facility] [address] [facility]  [address] [facility] [address]  Phone: [phone] Fax: [phone]    [facility]  [address]  [facility] [address]  Phone: [phone] Fax: [phone]      Initial Assessment (most recent)     Adult Discharge Assessment - 04/27/22 1147        Discharge Assessment    Assessment Type Discharge Planning Assessment     Confirmed/corrected address, phone number and insurance Yes     Confirmed Demographics Correct on Facesheet     Source of Information patient     When was your last doctors appointment? --   not sure    Communicated SIENNA with patient/caregiver Date not available/Unable to determine     Reason For Admission chf     Lives With sibling(s)      Facility Arrived From: home     Do you expect to return to your current living situation? Yes     Do you have help at home or someone to help you manage your care at home? Yes     Who are your caregiver(s) and their phone number(s)? Aide Watson [phone]     Prior to hospitilization cognitive status: Alert/Oriented     Current cognitive status: Alert/Oriented     Walking or Climbing Stairs Difficulty ambulation difficulty, requires equipment     Mobility Management uses prn cane or walker     Dressing/Bathing Difficulty none     Home Accessibility wheelchair accessible     Equipment Currently Used at Home walker, rolling;hospital bed;bedside commode;cane, straight;bath bench     Readmission within 30 days? No     Patient currently being followed by outpatient case management? No     Do you currently have service(s) that help you manage your care at home? Yes     Name and Contact number of agency [facility] tish sharif, GERALD Staples  [phone]     Is the pt/caregiver preference to resume services with current agency Yes     Do you take prescription medications? Yes     Do you have prescription coverage? Yes     Coverage humana     Do you have any problems affording any of your prescribed medications? No     Is the patient taking medications as prescribed? yes     Who is going to help you get home at discharge? Carline     How do you get to doctors appointments? family or friend will provide     Are you on dialysis? Yes     Dialysis Name and Scheduled days MWF [facility]     Do you take coumadin? Yes     Who monitors your labs? [facility]     Discharge Plan A Home with family;Home Health     Discharge Plan B Home;Home with family;Home Health     DME Needed Upon Discharge  none     Discharge Plan discussed with: Patient     Discharge Barriers Identified None        Relationship/Environment    Name(s) of Who Lives With Patient carline Watson

## 2022-04-27 NOTE — ASSESSMENT & PLAN NOTE
Concern for hematemesis on admission  Holding Coumadin  Trend hemoglobin   gastroenterology consulted recommend conservative management for now  Continue Protonix

## 2022-04-27 NOTE — PLAN OF CARE
04/26/22 1935   Patient Assessment/Suction   Level of Consciousness (AVPU) alert   Respiratory Effort Unlabored   Expansion/Accessory Muscles/Retractions expansion symmetric   All Lung Fields Breath Sounds diminished   Rhythm/Pattern, Respiratory depth regular;pattern regular   Cough Frequency infrequent   Cough Type fair;nonproductive   PRE-TX-O2   O2 Device (Oxygen Therapy) nasal cannula   $ Is the patient on Low Flow Oxygen? Yes   Flow (L/min) 4   SpO2 100 %   Pulse Oximetry Type Continuous   $ Pulse Oximetry - Multiple Charge Pulse Oximetry - Multiple   Pulse (!) 115   Resp 16   Aerosol Therapy   $ Aerosol Therapy Charges Aerosol Treatment   Daily Review of Necessity (SVN) completed   Respiratory Treatment Status (SVN) given   Treatment Route (SVN) mask   Patient Position (SVN) HOB elevated   Post Treatment Assessment (SVN) breath sounds unchanged   Signs of Intolerance (SVN) none   Breath Sounds Post-Respiratory Treatment   Throughout All Fields Post-Treatment All Fields   Throughout All Fields Post-Treatment diminished   Post-treatment Heart Rate (beats/min) 113   Post-treatment Resp Rate (breaths/min) 20

## 2022-04-28 NOTE — NURSING
Pt transferred to 1203 on remote tele. Report given to TYRONE Duran. Belongings at bedside, cell phone and  dentures are present.

## 2022-04-28 NOTE — CARE UPDATE
04/27/22 2004   Patient Assessment/Suction   Respiratory Effort Unlabored   All Lung Fields Breath Sounds diminished   Rhythm/Pattern, Respiratory pattern regular   PRE-TX-O2   O2 Device (Oxygen Therapy) nasal cannula   Flow (L/min) 2   SpO2 96 %   Pulse Oximetry Type Continuous   Pulse 98   Resp 20   Aerosol Therapy   $ Aerosol Therapy Charges Aerosol Treatment   Respiratory Treatment Status (SVN) given   Treatment Route (SVN) mask   Patient Position (SVN) HOB elevated   Post Treatment Assessment (SVN) increased aeration   Signs of Intolerance (SVN) none   Breath Sounds Post-Respiratory Treatment   Throughout All Fields Post-Treatment All Fields   Throughout All Fields Post-Treatment clear   Post-treatment Heart Rate (beats/min) 97   Post-treatment Resp Rate (breaths/min) 20   Education   $ Education 15 min;Bronchodilator   Respiratory Evaluation   $ Care Plan Tech Time 15 min   $ Eval/Re-eval Charges Evaluation   Evaluation For New Orders

## 2022-04-28 NOTE — DISCHARGE SUMMARY
[facility] Medicine  Discharge Summary      Patient Name: Griselda Neely  MRN: [id number]  Patient Class: IP- Inpatient  Admission Date: 4/26/2022  Hospital Length of Stay: 2 days  Discharge Date and Time:  04/28/2022 6:07 PM  Attending Physician: Lily att. providers found   Discharging Provider: Robson Kaiser MD  Primary Care Provider: Denver Cerna NP      HPI:   No notes on file    * No surgery found *      Hospital Course:   The patient is 76-year-old  female with a history of ESRD who presented with symptoms of shortness of breath and diarrhea.  She had symptoms of hypotension no on admission was placed in ICU.  However, she was volume overloaded and required dialysis for improvement in her respiratory status.  When she received dialysis she was feeling much much better.  Additionally, her diarrhea has resolved.  Gastroenterology was consulted and did recommend that she receive colonoscopy however she has declined at this time wants to return home.  Given that her laboratory values have improved with dialysis in addition to her symptoms shortness of breath.  She was felt safe to be rate turn home for ongoing management by her outpatient providers.  At discharge, she is tolerating her home oxygen requirement of 2-4 L. her home health was resumed at discharge for ongoing management as an outpatient.         Goals of Care Treatment Preferences:  Code Status: DNR      Consults:   Consults (From admission, onward)        Status Ordering Provider     Inpatient consult to Registered Dietitian/Nutritionist  Once        Provider:  (Not yet assigned)    HARSHAL Gallardo     Inpatient consult to Nephrology  Once        Provider:  Joseph Ingram MD    Completed ERENDIRA GASTELUM     Inpatient consult to Hospitalist  Once        Provider:  Homer Carlson MD    Acknowledged ERENDIRA GASTELUM     Inpatient consult to Gastroenterology  Once        Provider:  Sudheer GAMING  Kevin TERRY MD    Completed ERENDIRA GASTELUM          No new Assessment & Plan notes have been filed under this hospital service since the last note was generated.  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 difficile colitis [A04.72] 05/31/2016 Yes    ESRD (end stage renal disease) on HD M,W, F [N18.6] 05/11/2016 Yes     Chronic    HTN (hypertension) [I10] 08/15/2013 Yes     Chronic      Problems Resolved During this Admission:       Discharged Condition: good    Disposition: Home or Self Care    Follow Up:   Contact information for follow-up providers     Denver Cerna NP. Schedule an appointment as soon as possible for a visit in 1 week(s).    Specialty: Family Medicine  Contact information:  [address] [address] [phone]                   Contact information for after-discharge care     Dialysis/Infusion     [facility] .    Service:  Dialysis  Contact information:  [address]  [facility] [address]  [phone]                 Home Medical Care     [facility] .    Service: Home Nursing  Contact information:  [address]  [facility] [address]  [phone]                           Patient Instructions:      Ambulatory referral/consult to Home Health   Standing Status: Future   Referral Priority: Routine Referral Type: Home Health   Referral Reason: Specialty Services Required   Requested Specialty: Home Health Services   Number of Visits Requested: 1     Diet Adult Regular     No dressing needed     Activity as tolerated       Significant Diagnostic Studies: Labs:   BMP:   Recent Labs   Lab 04/27/22  0503   GLU 78   *   K 4.5   CL 91*   CO2 25   BUN 44*   CREATININE 6.8*   CALCIUM 8.0*   MG 1.7   , CBC   Recent Labs   Lab 04/27/22  0503   WBC 4.62   HGB 11.2*   HCT 35.7*       and All labs within the past 24 hours have been reviewed    Pending Diagnostic Studies:     None         Medications:  Reconciled Home Medications:      Medication List      CONTINUE taking these medications    * albuterol 90 mcg/actuation inhaler  Commonly known as: PROVENTIL/VENTOLIN HFA  Inhale 1 puff into the lungs every 4 (four) hours as needed for Wheezing. rescue     * albuterol 1.25 mg/3 mL Nebu  Commonly known as: ACCUNEB  Take 1.25 mg by nebulization every 6 (six) hours as needed. Rescue  HCS     aspirin 81 MG EC tablet  Commonly known as: ECOTRIN  Take 81 mg by mouth once daily.     calcium acetate(phosphat bind) 667 mg capsule  Commonly known as: PHOSLO  Take 667 mg by mouth 3 (three) times daily with meals.     cyanocobalamin 1000 MCG tablet  Commonly known as: VITAMIN B-12  Take 1 tablet (1,000 mcg total) by mouth once daily.     diltiaZEM 30 MG tablet  Commonly known as: CARDIZEM  Take 1 tablet (30 mg total) by mouth 4 (four) times daily before meals and nightly.     heparin (porcine)  10,000 unit/mL injection  Heparin Sodium (Porcine) 1,000 Units/mL Systemic     isosorbide mononitrate 30 MG 24 hr tablet  Commonly known as: IMDUR  Take 1 tablet (30 mg total) by mouth once daily.     LORazepam 0.5 MG tablet  Commonly known as: ATIVAN  Take 1 tablet (0.5 mg total) by mouth every 6 (six) hours as needed for Anxiety.     losartan 25 MG tablet  Commonly known as: COZAAR  Take 1 tablet (25 mg total) by mouth every evening.     metoprolol succinate 25 MG 24 hr tablet  Commonly known as: TOPROL-XL  Take 1 tablet by mouth nightly     montelukast 10 mg tablet  Commonly known as: SINGULAIR  Take 10 mg by mouth every evening.     ondansetron 4 MG tablet  Commonly known as: ZOFRAN  Take 1 tablet (4 mg total) by mouth every 6 (six) hours as needed for Nausea.     oxyCODONE-acetaminophen 5-325 mg per tablet  Commonly known as: PERCOCET  Take 1 tablet by mouth every 4 (four) hours as needed for Pain.     pantoprazole 40 MG tablet  Commonly known as: PROTONIX  Take 1 tablet by mouth once daily     * Saccharomyces boulardii 250 mg capsule  Commonly known as: FLORASTOR  Take 1 capsule (250 mg total) by mouth with lunch.     * Saccharomyces boulardii 250 mg capsule  Commonly known as: FLORASTOR  1 capsule DAILY (route: oral)     sodium chloride 0.9% SolP 100 mL with iron sucrose 100 mg iron/5 mL Soln 100 mg  50 mg.     VITAMIN B COMPLEX ORAL  1 tablet DAILY (route: oral)     warfarin 2.5 MG tablet  Commonly known as: COUMADIN  Take 2.5 mg by mouth Daily.         * This list has 4 medication(s) that are the same as other medications prescribed for you. Read the directions carefully, and ask your doctor or other care provider to review them with you.            STOP taking these medications    hydrALAZINE 50 MG tablet  Commonly known as: APRESOLINE     mupirocin 2 % ointment  Commonly known as: BACTROBAN            Indwelling Lines/Drains at time of discharge:   Lines/Drains/Airways     Drain  Duration                 Hemodialysis AV Fistula 08/08/19 0214 Right upper arm 994 days                Time spent on the discharge of patient: 55 minutes         Robson Kaiser MD  Department of Hospital Medicine  [facility]

## 2022-04-28 NOTE — PLAN OF CARE
04/28/22 1423   Final Note   Assessment Type Final Discharge Note   Anticipated Discharge Disposition Home-Health   Post-Acute Status   Post-Acute Authorization Home Health   Home Health Status Set-up Complete/Auth obtained  (Already on service with [facility])   Discharge Delays (!) Other  (Patient sister bringing her home oxygen)   CM call to [facility] [phone] spoke with Julissa. Verified her chair time is MWF. DC order/clinicals sent to [facility].   Per patient family takes her to her HD  Referral to [facility] for home health. Call to Rowena [phone] patient already on service with [facility]. Clinicals/order sent to [facility]. Patient verified that she is on service with [facility] for nursing.   Family will be picking up patient.   Once her Oxygen from home arrives, no other needs identified from Case Management.

## 2022-04-28 NOTE — HOSPITAL COURSE
The patient is 76-year-old  female with a history of ESRD who presented with symptoms of shortness of breath and diarrhea.  She had symptoms of hypotension no on admission was placed in ICU.  However, she was volume overloaded and required dialysis for improvement in her respiratory status.  When she received dialysis she was feeling much much better.  Additionally, her diarrhea has resolved.  Gastroenterology was consulted and did recommend that she receive colonoscopy however she has declined at this time wants to return home.  Given that her laboratory values have improved with dialysis in addition to her symptoms shortness of breath.  She was felt safe to be rate turn home for ongoing management by her outpatient providers.  At discharge, she is tolerating her home oxygen requirement of 2-4 L. her home health was resumed at discharge for ongoing management as an outpatient.

## 2022-04-28 NOTE — PLAN OF CARE
04/28/22 1421   Post-Acute Status   Post-Acute Authorization Home Health;Dialysis   Home Health Status Set-up Complete/Auth obtained   Diaylsis Status   (Patient already established chair at [facility] [phone] in [place].)   Discharge Delays (!) Other  (Family to bring home oxygen)   Discharge Plan   Discharge Plan A Home Health   Discharge Plan B Home Health

## 2022-04-28 NOTE — PLAN OF CARE
Problem: Adult Inpatient Plan of Care  Goal: Plan of Care Review  Outcome: Ongoing, Progressing  Goal: Patient-Specific Goal (Individualized)  Outcome: Ongoing, Progressing  Goal: Absence of Hospital-Acquired Illness or Injury  Outcome: Ongoing, Progressing  Goal: Optimal Comfort and Wellbeing  Outcome: Ongoing, Progressing  Goal: Readiness for Transition of Care  Outcome: Ongoing, Progressing     Problem: Fluid Imbalance (Pneumonia)  Goal: Fluid Balance  Outcome: Ongoing, Progressing     Problem: Infection (Pneumonia)  Goal: Resolution of Infection Signs and Symptoms  Outcome: Ongoing, Progressing     Problem: Infection (Hemodialysis)  Goal: Absence of Infection Signs and Symptoms  Outcome: Ongoing, Progressing     Problem: Hemodynamic Instability (Hemodialysis)  Goal: Effective Tissue Perfusion  Outcome: Ongoing, Progressing

## 2022-04-29 PROBLEM — J44.1 COPD EXACERBATION: Status: ACTIVE | Noted: 2022-01-01

## 2022-04-29 NOTE — PROGRESS NOTES
Admitted 4/26-4/28. Hospital course: The patient is 76-year-old  female with a history of ESRD who presented with symptoms of shortness of breath and diarrhea.  She had symptoms of hypotension no on admission was placed in ICU.  However, she was volume overloaded and required dialysis for improvement in her respiratory status.  When she received dialysis she was feeling much much better.  Additionally, her diarrhea has resolved.  Gastroenterology was consulted and did recommend that she receive colonoscopy however she has declined at this time wants to return home.  Given that her laboratory values have improved with dialysis in addition to her symptoms shortness of breath.  She was felt safe to be rate turn home for ongoing management by her outpatient providers.  At discharge, she is tolerating her home oxygen requirement of 2-4 L. her home health was resumed at discharge for ongoing management as an outpatient.

## 2022-04-29 NOTE — TELEPHONE ENCOUNTER
----- Message from Maximo Rubio MA sent at 4/29/2022  9:22 AM CDT -----  Contact: josh  Type: Needs Medical Advice    Who Called: josh-[facility]  Best Call Back Number: [phone]  Inquiry/Question: Would you kindly call josh regarding SRIKANTH LUONG [[id number]]  inr orders are not in place requesting order for inr after discharge from the hospital Thank you~

## 2022-04-29 NOTE — TELEPHONE ENCOUNTER
----- Message from Nila Dukes sent at 4/29/2022 12:30 PM CDT -----  Contact: [facility]  Type: Needs Medical Advice    Who Called: [facility]  Best Call Back Number: [phone]      Inquiry/Question: saw pt for a visit. Low bp 90/52, elevated heart rate 125, diminished lung sounds with very little air flow. Pt also had O2 level of 86-91 percent on 4 liters. Nurse advised pt to report to ER and pt decilined as she was just discharged yesterday       Thank you~

## 2022-04-29 NOTE — TELEPHONE ENCOUNTER
Spoke to HH nurse reported pt refuses to go to ER; spoke to pt advised she go to ER pt refuses family notified voiced understanding and stated they will bring her to ER if condition worsens NP notified.

## 2022-04-29 NOTE — TELEPHONE ENCOUNTER
Returned call to patient in regards to home health nurse concerns. No answer , left detailed voicemail to return call.

## 2022-04-30 NOTE — H&P
[facility] Medicine  History & Physical    Patient Name: Griselda Neely  MRN: [id number]  Patient Class: OP- Observation  Admission Date: 4/29/2022  Attending Physician: Cristian Lopez MD   Primary Care Provider: Denver Cerna NP         Patient information was obtained from patient, relative(s), past medical records and ER records.     Subjective:     Principal Problem:Acute diastolic congestive heart failure    Chief Complaint:   Chief Complaint   Patient presents with    Shortness of Breath     Sob and low o2 saturation starting this morning after being discharged from hospital yesterday        HPI: 74 y.o. female with numerous medical problems including but not limited to ESRD on hemodialysis, diastolic CHF, hypertension, longstanding AFib, Anemia, Calciphylaxis (07/2017), diastolic, Encounter for blood transfusion (03/2016), Gout, Hemodialysis access, AV graft, Hypertension, Mitral valve regurgitation, Osteoarthritis, Pancreatitis, Peripheral vascular disease, mitral valve replacement on Coumadin with numerous ER visits and hospital admissions who was just discharged from our service yesterday when she was admitted for hypotension, hypervolemia came back with chief complaint of shortness of breath.  Upon further asking family tells me that she did not get full run of dialysis today and later in the evening home health nurse noticed her blood pressure was low, heart rate was very high and oxygen saturation was low and she was brought to emergency room.  Patient continues to smoke a frequently takes her oxygen off.  Otherwise patient herself denies any fever, chills, headache, dizziness, chest pain, palpitations, cough, nausea, vomiting, bladder or bowel symptoms.     In ED she was found to have borderline hypotension however she was completely asymptomatic, AFib with intermittent heart rate up to 110. She was found to have mild hypervolemia and mild COPD exacerbation.        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    both legs    CHF (congestive heart failure)     Diverticulitis 11/2021    Encounter for blood transfusion 03/2016    Gout     Hemodialysis access, AV graft     Hypertension     Mitral valve regurgitation     Osteoarthritis     Pancreatitis     Peripheral vascular disease     Pneumonia 09/09/2017    Renal failure        Past Surgical History:   Procedure Laterality Date    ACHILLES TENDON SURGERY Right     ANGIOGRAPHY OF ARTERIOVENOUS SHUNT Right 1/7/2020    Procedure: Fistulogram with Possible Intervention;  Surgeon: Joseph Loyola MD;  Location: [facility] CATH/EP LAB;  Service: Cardiology;  Laterality: Right;    ANGIOGRAPHY OF LOWER EXTREMITY Left 3/18/2020    Procedure: Angiogram Extremity Unilateral;  Surgeon: Ali Khoobehi, MD;  Location: [facility] CATH/EP LAB;  Service: Cardiology;  Laterality: Left;    APPENDECTOMY      CARDIAC CATHETERIZATION  07/03/2017    CHOLECYSTECTOMY      COLONOSCOPY Left 10/4/2020    Procedure: COLONOSCOPY;  Surgeon: Jordan Kilpatrick MD;  Location: [facility] ENDO;  Service: Endoscopy;  Laterality: Left;    ESOPHAGOGASTRODUODENOSCOPY Left 8/17/2020    Procedure: EGD (ESOPHAGOGASTRODUODENOSCOPY);  Surgeon: Talat Trevizo MD;  Location: [facility];  Service: Endoscopy;  Laterality: Left;    ESOPHAGOGASTRODUODENOSCOPY Left 10/2/2020    Procedure: EGD (ESOPHAGOGASTRODUODENOSCOPY);  Surgeon: Talat Trevizo MD;  Location: [facility];  Service: Endoscopy;  Laterality: Left;    ESOPHAGOGASTRODUODENOSCOPY N/A 6/29/2021    Procedure: EGD (ESOPHAGOGASTRODUODENOSCOPY);  Surgeon: Sudheer Brown III, MD;  Location: [facility];  Service: Endoscopy;  Laterality: N/A;    FISTULOGRAM Bilateral 4/11/2022    Procedure: FISTULOGRAM;  Surgeon: Joseph Loyola MD;  Location: [facility] CATH/EP LAB;  Service: General;  Laterality: Bilateral;    heal surgery Right     HYSTERECTOMY       INSERTION OF STENT INTO PERIPHERAL VESSEL N/A 1/7/2020    Procedure: INSERTION, STENT, VESSEL, PERIPHERAL;  Surgeon: Joseph Loyola MD;  Location: [facility] CATH/EP LAB;  Service: Cardiology;  Laterality: N/A;    MITRAL VALVE REPLACEMENT      PARATHYROIDECTOMY      PARATHYROIDECTOMY  07/13/2017    PERCUTANEOUS TRANSLUMINAL ANGIOPLASTY OF ARTERIOVENOUS FISTULA N/A 1/7/2020    Procedure: PTA, AV FISTULA;  Surgeon: Joseph Loyola MD;  Location: [facility] CATH/EP LAB;  Service: Cardiology;  Laterality: N/A;    PERITONEAL CATHETER INSERTION      RENAL BIOPSY      SMALL BOWEL ENTEROSCOPY N/A 12/2/2020    Procedure: ENTEROSCOPY;  Surgeon: Sudheer Brown III, MD;  Location: [facility] ENDO;  Service: Endoscopy;  Laterality: N/A;    vocal cord nodule      WOUND DEBRIDEMENT Left 7/13/2020    Procedure: DEBRIDEMENT, WOUND;  Surgeon: Carlos Elam III, MD;  Location: [facility] OR;  Service: General;  Laterality: Left;       Review of patient's allergies indicates:  No Known Allergies    No current facility-administered medications on file prior to encounter.     Current Outpatient Medications on File Prior to Encounter   Medication Sig    albuterol (ACCUNEB) 1.25 mg/3 mL Nebu Take 1.25 mg by nebulization every 6 (six) hours as needed. Rescue  HCS    albuterol (PROVENTIL/VENTOLIN HFA) 90 mcg/actuation inhaler Inhale 1 puff into the lungs every 4 (four) hours as needed for Wheezing. rescue    aspirin (ECOTRIN) 81 MG EC tablet Take 81 mg by mouth once daily.    calcium acetate,phosphat bind, (PHOSLO) 667 mg capsule Take 667 mg by mouth 3 (three) times daily with meals.    cyanocobalamin (VITAMIN B-12) 1000 MCG tablet Take 1 tablet (1,000 mcg total) by mouth once daily.    diltiaZEM (CARDIZEM) 30 MG tablet Take 1 tablet (30 mg total) by mouth 4 (four) times daily before meals and nightly.    heparin sodium,porcine (HEPARIN, PORCINE,) 10,000 unit/mL injection Heparin Sodium (Porcine) 1,000 Units/mL Systemic    isosorbide  mononitrate (IMDUR) 30 MG 24 hr tablet Take 1 tablet (30 mg total) by mouth once daily.    LORazepam (ATIVAN) 0.5 MG tablet Take 1 tablet (0.5 mg total) by mouth every 6 (six) hours as needed for Anxiety.    losartan (COZAAR) 25 MG tablet Take 1 tablet (25 mg total) by mouth every evening.    metoprolol succinate (TOPROL-XL) 25 MG 24 hr tablet Take 1 tablet by mouth nightly    montelukast (SINGULAIR) 10 mg tablet Take 10 mg by mouth every evening.    ondansetron (ZOFRAN) 4 MG tablet Take 1 tablet (4 mg total) by mouth every 6 (six) hours as needed for Nausea.    oxyCODONE-acetaminophen (PERCOCET) 5-325 mg per tablet Take 1 tablet by mouth every 4 (four) hours as needed for Pain.    pantoprazole (PROTONIX) 40 MG tablet Take 1 tablet by mouth once daily    Saccharomyces boulardii (FLORASTOR) 250 mg capsule Take 1 capsule (250 mg total) by mouth with lunch.    Saccharomyces boulardii (FLORASTOR) 250 mg capsule 1 capsule DAILY (route: oral)    sodium chloride 0.9% SolP 100 mL with iron sucrose 100 mg iron/5 mL Soln 100 mg 50 mg.    VITAMIN B COMPLEX ORAL 1 tablet DAILY (route: oral)    warfarin (COUMADIN) 2.5 MG tablet Take 2.5 mg by mouth Daily.    [DISCONTINUED] hydrALAZINE (APRESOLINE) 50 MG tablet Take 1 tablet (50 mg total) by mouth every 8 (eight) hours.     Family History       Problem Relation (Age of Onset)    Arthritis Mother    Diabetes Mother, Father    Early death Sister, Sister    Heart disease Sister, Maternal Grandfather, Mother    Heart failure Mother    Hypertension Mother          Tobacco Use    Smoking status: Current Some Day Smoker     Packs/day: 0.50     Years: 45.00     Pack years: 22.50     Types: Cigarettes    Smokeless tobacco: Never Used   Substance and Sexual Activity    Alcohol use: No    Drug use: No    Sexual activity: Not Currently     Review of Systems   Constitutional:  Positive for fatigue. Negative for chills and fever.   HENT:  Negative for sore throat.    Eyes:   Negative for redness and itching.   Respiratory:  Positive for cough, shortness of breath and wheezing. Negative for chest tightness.    Cardiovascular:  Positive for leg swelling. Negative for chest pain and palpitations.   Gastrointestinal:  Negative for blood in stool and nausea.   Genitourinary:  Negative for dysuria.   Musculoskeletal:  Negative for gait problem and joint swelling.   Skin:  Positive for rash and wound.   Neurological:  Negative for tremors and weakness.   Psychiatric/Behavioral:  Negative for behavioral problems and sleep disturbance.    All other systems reviewed and are negative.  Objective:     Vital Signs (Most Recent):  Temp: 97.6 °F (36.4 °C) (04/29/22 1842)  Pulse: 104 (04/29/22 1954)  Resp: (!) 25 (04/29/22 1954)  BP: 113/71 (04/29/22 1842)  SpO2: 97 % (04/29/22 1954)   Vital Signs (24h Range):  Temp:  [97.6 °F (36.4 °C)] 97.6 °F (36.4 °C)  Pulse:  [] 104  Resp:  [22-28] 25  SpO2:  [89 %-97 %] 97 %  BP: (113)/(71) 113/71     Weight: 60.5 kg (133 lb 6.1 oz)  Body mass index is 22.2 kg/m².    Physical Exam  Vitals and nursing note reviewed.   Constitutional:       Appearance: She is well-developed. She is ill-appearing.      Comments: Chronically ill-appearing   HENT:      Head: Atraumatic.      Mouth/Throat:      Mouth: Mucous membranes are dry.   Neck:      Vascular: No JVD.   Cardiovascular:      Rate and Rhythm: Tachycardia present. Rhythm irregular.      Heart sounds: Murmur heard.     No gallop.   Pulmonary:      Effort: No respiratory distress.      Breath sounds: Wheezing present.      Comments: Bilateral basal crackles noted, bilateral wheezing noted, on supplemental oxygen  Abdominal:      General: Bowel sounds are normal. There is no distension.      Palpations: Abdomen is soft.      Tenderness: There is no guarding or rebound.   Musculoskeletal:      Cervical back: Neck supple.      Comments: Bilateral chronic lower extremity skin changes   Lymphadenopathy:       Cervical: No cervical adenopathy.   Skin:     General: Skin is warm.      Capillary Refill: Capillary refill takes less than 2 seconds.      Findings: No rash.   Neurological:      General: No focal deficit present.      Mental Status: She is alert and oriented to person, place, and time.      Cranial Nerves: No cranial nerve deficit.           Significant Labs: All pertinent labs within the past 24 hours have been reviewed.    Significant Imaging: I have reviewed all pertinent imaging results/findings within the past 24 hours.    Assessment/Plan:     76-year-old lady with numerous medical problems including but not limited to ESRD on hemodialysis, diastolic CHF, COPD, AFib on Coumadin, mitral valve replacement on Coumadin who was just discharged from our service yesterday came back with shortness of breath, hypertension, found to have acute on chronic diastolic CHF and COPD exacerbation.  Compliance is big challenge with this patient.  She has numerous advanced comorbidities, extremely poor compliance, frequently refuses interventions, had many hospitalizations with near death outcome.    Active Hospital Problems    Diagnosis  POA    *Acute diastolic congestive heart failure [I50.31]  Yes     Priority: 1 - High    COPD exacerbation [J44.1]  Yes     Priority: 2     Longstanding persistent atrial fibrillation [I48.11]  Yes     Priority: 2     ESRD (end stage renal disease) on HD M,W, F [N18.6]  Yes     Priority: 3      Chronic    History of pancreatitis [Z87.19]  Not Applicable     Chronic    Long term current use of anticoagulant [Z79.01]  Not Applicable     Chronic    H/O mitral valve replacement [Z95.2]  Not Applicable     Chronic    Left atrial thrombus [I51.3]  Yes    PVD (peripheral vascular disease) [I73.9]  Yes     Chronic    DNR (do not resuscitate) [Z66]  Yes     Chronic    COPD (chronic obstructive pulmonary disease) [J44.9]  Yes     Chronic    Pulmonary hypertension [I27.20]  Yes     Chronic     Non-rheumatic mitral regurgitation [I34.0]  Yes    Coronary arteriosclerosis in native artery [I25.10]  Yes    Gastroesophageal reflux disease [K21.9]  Yes    Tobacco dependence [F17.200]  Yes     Chronic    HTN (hypertension) [I10]  Yes     Chronic    PND (paroxysmal nocturnal dyspnea) [R06.00]  Yes      Resolved Hospital Problems   No resolved problems to display.       Plan:  Admit to cardiology-observation    Overall picture is consistent with COPD exacerbation due to ongoing smoking, hypervolemia due to incomplete dialysis resulting into acute on chronic diastolic CHF exacerbation    Consult nephrology for extra session of dialysis tomorrow    DuoNebs, steroids    Resume essential home medications    Will hold antihypertensives in light of hypotension    Counseled on smoking cessation, family tells me patient smokes with oxygen on.  She also has poor compliance with oxygen that Renal seen prolonged hypoxia resulting into AFib and tachycardia    Further management as per clinical course    Check lipase, Daily labs including INR    DNR and DNI-confirmed    VTE Risk Mitigation (From admission, onward)         Ordered     warfarin (COUMADIN) tablet 2.5 mg  Daily         04/29/22 2250     Reason for No Pharmacological VTE Prophylaxis  Once        Question:  Reasons:  Answer:  Already adequately anticoagulated on oral Anticoagulants    04/29/22 2250     IP VTE HIGH RISK PATIENT  Once         04/29/22 2250     Place sequential compression device  Until discontinued         04/29/22 2250                   Cristian Lopez MD  Department of Hospital Medicine   [facility]

## 2022-04-30 NOTE — PROGRESS NOTES
[facility] Medicine  Progress Note    Patient Name: Griselda Neely  MRN: [id number]  Patient Class: OP- Observation   Admission Date: 4/29/2022  Length of Stay: 0 days  Attending Physician: Robson Kaiser MD  Primary Care Provider: Denver Cerna NP        Subjective:     Principal Problem:Acute diastolic congestive heart failure        HPI:  74 y.o. female with numerous medical problems including but not limited to ESRD on hemodialysis, diastolic CHF, hypertension, longstanding AFib, Anemia, Calciphylaxis (07/2017), diastolic, Encounter for blood transfusion (03/2016), Gout, Hemodialysis access, AV graft, Hypertension, Mitral valve regurgitation, Osteoarthritis, Pancreatitis, Peripheral vascular disease, mitral valve replacement on Coumadin with numerous ER visits and hospital admissions who was just discharged from our service yesterday when she was admitted for hypotension, hypervolemia came back with chief complaint of shortness of breath.  Upon further asking family tells me that she did not get full run of dialysis today and later in the evening home health nurse noticed her blood pressure was low, heart rate was very high and oxygen saturation was low and she was brought to emergency room.  Patient continues to smoke a frequently takes her oxygen off.  Otherwise patient herself denies any fever, chills, headache, dizziness, chest pain, palpitations, cough, nausea, vomiting, bladder or bowel symptoms.     In ED she was found to have borderline hypotension however she was completely asymptomatic, AFib with intermittent heart rate up to 110. She was found to have mild hypervolemia and mild COPD exacerbation.       Overview/Hospital Course:  No notes on file    Interval History:  Readmitted yesterday with hypotension and shortness of breath.  Found to have COPD exacerbation.  Additionally she did not complete her full session of dialysis because she wanted to smoke.  She has been  rather noncompliant.  However, given her hypotension will need to adjust her diltiazem and beta blockade to an alternate agent for control of her atrial fibrillation to avoid hypotension.  Will adjust amiodarone monitor overnight to make sure that she has good rate control and return home tomorrow if improving.    Review of Systems   All other systems reviewed and are negative.  Objective:     Vital Signs (Most Recent):  Temp: 97 °F (36.1 °C) (04/30/22 1200)  Pulse: 61 (04/30/22 1200)  Resp: 16 (04/30/22 1200)  BP: 135/62 (04/30/22 1200)  SpO2: 98 % (04/30/22 1200) Vital Signs (24h Range):  Temp:  [97 °F (36.1 °C)-98.1 °F (36.7 °C)] 97 °F (36.1 °C)  Pulse:  [] 61  Resp:  [16-28] 16  SpO2:  [89 %-100 %] 98 %  BP: ()/(55-78) 135/62     Weight: 56.6 kg (124 lb 12.8 oz)  Body mass index is 20.77 kg/m².    Intake/Output Summary (Last 24 hours) at 4/30/2022 1342  Last data filed at 4/30/2022 1200  Gross per 24 hour   Intake 740 ml   Output 1397 ml   Net -657 ml      Physical Exam  Constitutional:       Appearance: Normal appearance. She is normal weight.   HENT:      Head: Normocephalic and atraumatic.      Nose: Nose normal.      Mouth/Throat:      Mouth: Mucous membranes are moist.   Eyes:      Conjunctiva/sclera: Conjunctivae normal.      Pupils: Pupils are equal, round, and reactive to light.   Cardiovascular:      Rate and Rhythm: Normal rate and regular rhythm.      Pulses: Normal pulses.      Heart sounds: Normal heart sounds. No murmur heard.    No friction rub. No gallop.   Pulmonary:      Effort: Pulmonary effort is normal.      Breath sounds: Wheezing present. No rales.      Comments: On home 4 L nasal cannula  Abdominal:      General: Abdomen is flat. Bowel sounds are normal. There is no distension.      Palpations: Abdomen is soft.      Tenderness: There is no abdominal tenderness. There is no guarding.   Musculoskeletal:         General: No swelling. Normal range of motion.      Cervical back:  Normal range of motion and neck supple.   Skin:     General: Skin is warm and dry.   Neurological:      General: No focal deficit present.      Mental Status: She is alert.   Psychiatric:         Mood and Affect: Mood normal.         Thought Content: Thought content normal.         Judgment: Judgment normal.       Significant Labs: All pertinent labs within the past 24 hours have been reviewed.    Significant Imaging: I have reviewed all pertinent imaging results/findings within the past 24 hours.      Assessment/Plan:      * Acute diastolic congestive heart failure  Patient is identified as having Diastolic (HFpEF) heart failure that is Acute on chronic. CHF is currently uncontrolled due to Rales/crackles on pulmonary exam and Pulmonary edema/pleural effusion on CXR. Latest ECHO performed and demonstrates- Results for orders placed during the hospital encounter of 04/26/22    Echo Saline Bubble? No    Interpretation Summary  · The left ventricle is normal in size with severe concentric hypertrophy and normal systolic function.  · The estimated ejection fraction is 50%.  · Indeterminate left ventricular diastolic function.  · There is mild left ventricular global hypokinesis.  · Moderate tricuspid regurgitation.  · There is a bioprosthetic mitral valve.  · Mild pulmonic regurgitation.  · There is abnormal septal wall motion. There is systolic flattening of the interventricular septum consistent with right ventricle pressure overload.  · Moderate right ventricular enlargement with low normal right ventricular systolic function.  · Severe left atrial enlargement.  · Severe right atrial enlargement.  · Mild aortic regurgitation.  · Small posterior pericardial effusion.  · Mild mitral regurgitation.  · Elevated central venous pressure (15 mmHg).  · The estimated PA systolic pressure is 47 mmHg.  · There is pulmonary hypertension.  . Continue Beta Blocker and monitor clinical status closely. Monitor on telemetry. Patient  is off CHF pathway.  Monitor strict Is&Os and daily weights.  Place on fluid restriction of 1.5 L. Continue to stress to patient importance of self efficacy and  on diet for CHF. Last BNP reviewed- and noted below   Recent Labs   Lab 04/29/22 2040   BNP >4,500*   .    - she gets most volume mgmt through HD, she missed half an HD session yesterday due to wanting to smoke  - she then came to the ED with SOB  - O2 requirement is at baseline.   - Getting HD today      Chronic hypoxemic respiratory failure  Patient with Hypercapnic and Hypoxic Respiratory failure which is Chronic.  she is on home oxygen at 4 LPM. Supplemental oxygen was provided and noted-  .   Signs/symptoms of respiratory failure include- tachypnea, increased work of breathing and wheezing. Contributing diagnoses includes - CHF and COPD Labs and images were reviewed. Patient Has not had a recent ABG. Will treat underlying causes and adjust management of respiratory failure as follows-     Continue home oxygen    Atrial fibrillation  She has chronic atrial fibrillation  Continue Coumadin  Daily INR  Adjusting her rate controlled amiodarone  Start with 200 mg t.i.d. for 7 days and then transition to 200 mg b.i.d.      COPD (chronic obstructive pulmonary disease)  Continue home medications  Oxygen supplementation is at baseline      ESRD (end stage renal disease) on HD M,W, F  Continue HD per regular schedule  Dr. Ingram is her nephrologist      HTN (hypertension)  She has been hypotensive at home  Holding beta-blocker calcium channel blocker  Will switch to amiodarone for rate control and atrial fibrillation        VTE Risk Mitigation (From admission, onward)         Ordered     warfarin (COUMADIN) tablet 2.5 mg  Daily         04/29/22 2250     Reason for No Pharmacological VTE Prophylaxis  Once        Question:  Reasons:  Answer:  Already adequately anticoagulated on oral Anticoagulants    04/29/22 2250     IP VTE HIGH RISK PATIENT  Once          04/29/22 2250     Place sequential compression device  Until discontinued         04/29/22 2250                Discharge Planning   SIENNA:      Code Status: DNR   Is the patient medically ready for discharge?:     Reason for patient still in hospital (select all that apply): Treatment  Discharge Plan A: Home with family, Home Health                  Robson Kaiser MD  Department of Hospital Medicine   [facility]

## 2022-04-30 NOTE — HPI
74 y.o. female with numerous medical problems including but not limited to ESRD on hemodialysis, diastolic CHF, hypertension, longstanding AFib, Anemia, Calciphylaxis (07/2017), diastolic, Encounter for blood transfusion (03/2016), Gout, Hemodialysis access, AV graft, Hypertension, Mitral valve regurgitation, Osteoarthritis, Pancreatitis, Peripheral vascular disease, mitral valve replacement on Coumadin with numerous ER visits and hospital admissions who was just discharged from our service yesterday when she was admitted for hypotension, hypervolemia came back with chief complaint of shortness of breath.  Upon further asking family tells me that she did not get full run of dialysis today and later in the evening home health nurse noticed her blood pressure was low, heart rate was very high and oxygen saturation was low and she was brought to emergency room.  Patient continues to smoke a frequently takes her oxygen off.  Otherwise patient herself denies any fever, chills, headache, dizziness, chest pain, palpitations, cough, nausea, vomiting, bladder or bowel symptoms.     In ED she was found to have borderline hypotension however she was completely asymptomatic, AFib with intermittent heart rate up to 110. She was found to have mild hypervolemia and mild COPD exacerbation.

## 2022-04-30 NOTE — SUBJECTIVE & OBJECTIVE
Interval History:  Readmitted yesterday with hypotension and shortness of breath.  Found to have COPD exacerbation.  Additionally she did not complete her full session of dialysis because she wanted to smoke.  She has been rather noncompliant.  However, given her hypotension will need to adjust her diltiazem and beta blockade to an alternate agent for control of her atrial fibrillation to avoid hypotension.  Will adjust amiodarone monitor overnight to make sure that she has good rate control and return home tomorrow if improving.    Review of Systems   All other systems reviewed and are negative.  Objective:     Vital Signs (Most Recent):  Temp: 97 °F (36.1 °C) (04/30/22 1200)  Pulse: 61 (04/30/22 1200)  Resp: 16 (04/30/22 1200)  BP: 135/62 (04/30/22 1200)  SpO2: 98 % (04/30/22 1200) Vital Signs (24h Range):  Temp:  [97 °F (36.1 °C)-98.1 °F (36.7 °C)] 97 °F (36.1 °C)  Pulse:  [] 61  Resp:  [16-28] 16  SpO2:  [89 %-100 %] 98 %  BP: ()/(55-78) 135/62     Weight: 56.6 kg (124 lb 12.8 oz)  Body mass index is 20.77 kg/m².    Intake/Output Summary (Last 24 hours) at 4/30/2022 1342  Last data filed at 4/30/2022 1200  Gross per 24 hour   Intake 740 ml   Output 1397 ml   Net -657 ml      Physical Exam  Constitutional:       Appearance: Normal appearance. She is normal weight.   HENT:      Head: Normocephalic and atraumatic.      Nose: Nose normal.      Mouth/Throat:      Mouth: Mucous membranes are moist.   Eyes:      Conjunctiva/sclera: Conjunctivae normal.      Pupils: Pupils are equal, round, and reactive to light.   Cardiovascular:      Rate and Rhythm: Normal rate and regular rhythm.      Pulses: Normal pulses.      Heart sounds: Normal heart sounds. No murmur heard.    No friction rub. No gallop.   Pulmonary:      Effort: Pulmonary effort is normal.      Breath sounds: Wheezing present. No rales.      Comments: On home 4 L nasal cannula  Abdominal:      General: Abdomen is flat. Bowel sounds are normal.  There is no distension.      Palpations: Abdomen is soft.      Tenderness: There is no abdominal tenderness. There is no guarding.   Musculoskeletal:         General: No swelling. Normal range of motion.      Cervical back: Normal range of motion and neck supple.   Skin:     General: Skin is warm and dry.   Neurological:      General: No focal deficit present.      Mental Status: She is alert.   Psychiatric:         Mood and Affect: Mood normal.         Thought Content: Thought content normal.         Judgment: Judgment normal.       Significant Labs: All pertinent labs within the past 24 hours have been reviewed.    Significant Imaging: I have reviewed all pertinent imaging results/findings within the past 24 hours.

## 2022-04-30 NOTE — RESPIRATORY THERAPY
04/30/22 0356   Patient Assessment/Suction   Level of Consciousness (AVPU) alert   Respiratory Effort Short of breath   Expansion/Accessory Muscles/Retractions no use of accessory muscles   All Lung Fields Breath Sounds Anterior:;coarse   Rhythm/Pattern, Respiratory shortness of breath   Cough Frequency infrequent   PRE-TX-O2   O2 Device (Oxygen Therapy) nasal cannula   Flow (L/min) 4   SpO2 100 %   Pulse Oximetry Type Intermittent   $ Pulse Oximetry - Multiple Charge Pulse Oximetry - Multiple   Pulse 108   Resp 17   Aerosol Therapy   $ Aerosol Therapy Charges Aerosol Treatment   Daily Review of Necessity (SVN) completed   Respiratory Treatment Status (SVN) given   Treatment Route (SVN) mask   Patient Position (SVN) HOB elevated   Post Treatment Assessment (SVN) increased aeration   Signs of Intolerance (SVN) none   Breath Sounds Post-Respiratory Treatment   Throughout All Fields Post-Treatment All Fields   Post-treatment Heart Rate (beats/min) 108   Post-treatment Resp Rate (breaths/min) 16   Education   $ Education Bronchodilator;15 min   Respiratory Evaluation   $ Care Plan Tech Time 15 min   $ Eval/Re-eval Charges Evaluation   Evaluation For New Orders

## 2022-04-30 NOTE — PLAN OF CARE
[facility]  Initial Discharge Assessment       Primary Care Provider: Denver Cerna NP    Admission Diagnosis: SOB (shortness of breath) [R06.02]    Admission Date: 4/29/2022  Expected Discharge Date:      Assessment completed: At bedside with patient  Advanced Directives: None  Marital Status:   Children: 1 Adult  Next of Kin: Son     Needs: None     Discharge Plan: Home with sisters. [facility]      CM  will continue to follow.        Discharge Barriers Identified: None    Payor: [employer] MANAGED MEDICARE / Plan: [employer] MEDICARE HMO / Product Type: Capitation /     Extended Emergency Contact Information  Primary Emergency Contact: WatsonAide venegas  Address: [address] [place] [address] [place]  Mobile Phone: [phone]  Relation: Sister  Secondary Emergency Contact: Elvi Hi  Address: UNKNOWN   [place]  Mobile Phone: [phone]  Relation: Sister    Discharge Plan A: Home with family, Home Health  Discharge Plan B: Home with family, Home Health      [facility] [address] [place], [place] - [address]  [address]  [place] [address]  Phone: [phone] Fax: [phone]    [facility] [address]  [address] [facility] [address]  Phone: [phone] Fax: [phone]    [facility]  [address]  [facility] [address]  Phone: [phone] Fax: [phone]      Initial Assessment (most recent)     Adult Discharge Assessment - 04/30/22 1341        Discharge Assessment    Assessment Type Discharge Planning Assessment     Confirmed/corrected address, phone number and insurance Yes     Confirmed Demographics Correct on Facesheet     Source of Information patient     When was your last doctors appointment? --   Patient does not remember    Does patient/caregiver understand observation status Yes     Communicated SIENNA with  patient/caregiver Date not available/Unable to determine     Lives With sibling(s)     Do you expect to return to your current living situation? Yes   Home    Do you have help at home or someone to help you manage your care at home? Yes     Who are your caregiver(s) and their phone number(s)? Aide Watson (sister) [phone]     Prior to hospitilization cognitive status: Alert/Oriented     Current cognitive status: Alert/Oriented     Walking or Climbing Stairs Difficulty ambulation difficulty, requires equipment     Dressing/Bathing Difficulty none     Home Accessibility wheelchair accessible     Home Layout Able to live on 1st floor     Equipment Currently Used at Home walker, rolling;hospital bed;bedside commode;cane, straight;bath bench     Readmission within 30 days? Yes     Patient currently being followed by outpatient case management? No     Do you currently have service(s) that help you manage your care at home? Yes     Name and Contact number of agency [facility]   [phone]     Is the pt/caregiver preference to resume services with current agency Yes     Do you take prescription medications? Yes     Do you have prescription coverage? Yes     Coverage [employer]     Do you have any problems affording any of your prescribed medications? No     Is the patient taking medications as prescribed? yes     Who is going to help you get home at discharge? Sister     How do you get to doctors appointments? family or friend will provide     Are you on dialysis? Yes     Dialysis Name and Scheduled days [facility] MWF     Do you take coumadin? Yes     Who monitors your labs? [facility]     Discharge Plan A Home with family;Home Health     Discharge Plan B Home with family;Home Health     DME Needed Upon Discharge  none     Discharge Plan discussed with: Patient     Discharge Barriers Identified None                 Readmission Assessment (most recent)     Readmission Assessment - 04/30/22 9077         Readmission    Why were you hospitalized in the last 30 days? acute pulmonary edema     Why were you readmitted? Alarmed about signs/symptoms     When you left the hospital how did you feel? Felt OK     When you left the hospital where did you go? Home with Home Health     Did patient/caregiver refused recommended DC plan? No     Tell me about what happened between when you left the hospital and the day you returned. Started feeling SOB     When did you start not feeling well? 4/29/22     Did you try to manage your symptoms your self? Yes     Did you call anyone? No     Why? SOB came back to the ED     Did you try to see or did see a doctor or nurse before you came? No     Why? Just came back to ED     Did you have  a follow-up appointment on discharge? No     Was this a planned readmission? No

## 2022-04-30 NOTE — PLAN OF CARE
Problem: Adult Inpatient Plan of Care  Goal: Plan of Care Review  Outcome: Ongoing, Progressing  Goal: Patient-Specific Goal (Individualized)  Outcome: Ongoing, Progressing  Goal: Absence of Hospital-Acquired Illness or Injury  Outcome: Ongoing, Progressing  Goal: Optimal Comfort and Wellbeing  Outcome: Ongoing, Progressing  Goal: Readiness for Transition of Care  Outcome: Ongoing, Progressing     Problem: Fluid Imbalance (Pneumonia)  Goal: Fluid Balance  Outcome: Ongoing, Progressing     Problem: Infection (Pneumonia)  Goal: Resolution of Infection Signs and Symptoms  Outcome: Ongoing, Progressing     Problem: Respiratory Compromise (Pneumonia)  Goal: Effective Oxygenation and Ventilation  Outcome: Ongoing, Progressing     Problem: Fall Injury Risk  Goal: Absence of Fall and Fall-Related Injury  Outcome: Ongoing, Progressing     Problem: Device-Related Complication Risk (Hemodialysis)  Goal: Safe, Effective Therapy Delivery  Outcome: Ongoing, Progressing     Problem: Hemodynamic Instability (Hemodialysis)  Goal: Effective Tissue Perfusion  Outcome: Ongoing, Progressing     Problem: Infection (Hemodialysis)  Goal: Absence of Infection Signs and Symptoms  Outcome: Ongoing, Progressing

## 2022-04-30 NOTE — PROGRESS NOTES
900 ml off net uf removed . Pt refusing to stay for the full 3 hrs . Trying to crawl out of bed yelling she needs to use the bathroon a bedpan was offer but pt refused. continues to scream. Dr. Santiago aware pt only ran 2 hrs.

## 2022-04-30 NOTE — ASSESSMENT & PLAN NOTE
She has been hypotensive at home  Holding beta-blocker calcium channel blocker  Will switch to amiodarone for rate control and atrial fibrillation

## 2022-04-30 NOTE — ASSESSMENT & PLAN NOTE
Patient with Hypercapnic and Hypoxic Respiratory failure which is Chronic.  she is on home oxygen at 4 LPM. Supplemental oxygen was provided and noted-  .   Signs/symptoms of respiratory failure include- tachypnea, increased work of breathing and wheezing. Contributing diagnoses includes - CHF and COPD Labs and images were reviewed. Patient Has not had a recent ABG. Will treat underlying causes and adjust management of respiratory failure as follows-     Continue home oxygen

## 2022-04-30 NOTE — ASSESSMENT & PLAN NOTE
Patient is identified as having Diastolic (HFpEF) heart failure that is Acute on chronic. CHF is currently uncontrolled due to Rales/crackles on pulmonary exam and Pulmonary edema/pleural effusion on CXR. Latest ECHO performed and demonstrates- Results for orders placed during the hospital encounter of 04/26/22    Echo Saline Bubble? No    Interpretation Summary  · The left ventricle is normal in size with severe concentric hypertrophy and normal systolic function.  · The estimated ejection fraction is 50%.  · Indeterminate left ventricular diastolic function.  · There is mild left ventricular global hypokinesis.  · Moderate tricuspid regurgitation.  · There is a bioprosthetic mitral valve.  · Mild pulmonic regurgitation.  · There is abnormal septal wall motion. There is systolic flattening of the interventricular septum consistent with right ventricle pressure overload.  · Moderate right ventricular enlargement with low normal right ventricular systolic function.  · Severe left atrial enlargement.  · Severe right atrial enlargement.  · Mild aortic regurgitation.  · Small posterior pericardial effusion.  · Mild mitral regurgitation.  · Elevated central venous pressure (15 mmHg).  · The estimated PA systolic pressure is 47 mmHg.  · There is pulmonary hypertension.  . Continue Beta Blocker and monitor clinical status closely. Monitor on telemetry. Patient is off CHF pathway.  Monitor strict Is&Os and daily weights.  Place on fluid restriction of 1.5 L. Continue to stress to patient importance of self efficacy and  on diet for CHF. Last BNP reviewed- and noted below   Recent Labs   Lab 04/29/22 2040   BNP >4,500*   .    - she gets most volume mgmt through HD, she missed half an HD session yesterday due to wanting to smoke  - she then came to the ED with SOB  - O2 requirement is at baseline.   - Getting HD today

## 2022-04-30 NOTE — ED PROVIDER NOTES
Encounter Date: 4/29/2022       History     Chief Complaint   Patient presents with    Shortness of Breath     Sob and low o2 saturation starting this morning after being discharged from hospital yesterday     The patient presents the emergency department with complaints of shortness of breath worsened since discharge from the hospital patient was admitted and treated for congestive heart failure released yesterday states that shortness of breath worsened today she does wear 4-5 L of oxygen per nasal cannula she was found be 89% arrival on her nasal cannula she denies any chest pain she reports cough productive of a clear sputum she denies any fever chills no abdominal pains for decreased appetite no nausea vomiting or diarrhea additionally patient has a history of end-stage renal disease and was dialyzed for volume overload during her hospitalization        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    both legs    CHF (congestive heart failure)     Diverticulitis 11/2021    Encounter for blood transfusion 03/2016    Gout     Hemodialysis access, AV graft     Hypertension     Mitral valve regurgitation     Osteoarthritis     Pancreatitis     Peripheral vascular disease     Pneumonia 09/09/2017    Renal failure      Past Surgical History:   Procedure Laterality Date    ACHILLES TENDON SURGERY Right     ANGIOGRAPHY OF ARTERIOVENOUS SHUNT Right 1/7/2020    Procedure: Fistulogram with Possible Intervention;  Surgeon: Joseph Loyola MD;  Location: [facility] CATH/EP LAB;  Service: Cardiology;  Laterality: Right;    ANGIOGRAPHY OF LOWER EXTREMITY Left 3/18/2020    Procedure: Angiogram Extremity Unilateral;  Surgeon: Ali Khoobehi, MD;  Location: [facility] CATH/EP LAB;  Service: Cardiology;  Laterality: Left;    APPENDECTOMY      CARDIAC CATHETERIZATION  07/03/2017    CHOLECYSTECTOMY       COLONOSCOPY Left 10/4/2020    Procedure: COLONOSCOPY;  Surgeon: Jordan Kilpatrick MD;  Location: [facility] ENDO;  Service: Endoscopy;  Laterality: Left;    ESOPHAGOGASTRODUODENOSCOPY Left 8/17/2020    Procedure: EGD (ESOPHAGOGASTRODUODENOSCOPY);  Surgeon: Talat Trevizo MD;  Location: [facility] ENDO;  Service: Endoscopy;  Laterality: Left;    ESOPHAGOGASTRODUODENOSCOPY Left 10/2/2020    Procedure: EGD (ESOPHAGOGASTRODUODENOSCOPY);  Surgeon: Talat Trevizo MD;  Location: [facility] ENDO;  Service: Endoscopy;  Laterality: Left;    ESOPHAGOGASTRODUODENOSCOPY N/A 6/29/2021    Procedure: EGD (ESOPHAGOGASTRODUODENOSCOPY);  Surgeon: Sudheer Brown III, MD;  Location: [facility] ENDO;  Service: Endoscopy;  Laterality: N/A;    FISTULOGRAM Bilateral 4/11/2022    Procedure: FISTULOGRAM;  Surgeon: Joseph Loyola MD;  Location: [facility] CATH/EP LAB;  Service: General;  Laterality: Bilateral;    heal surgery Right     HYSTERECTOMY      INSERTION OF STENT INTO PERIPHERAL VESSEL N/A 1/7/2020    Procedure: INSERTION, STENT, VESSEL, PERIPHERAL;  Surgeon: Joseph Loyoal MD;  Location: [facility] CATH/EP LAB;  Service: Cardiology;  Laterality: N/A;    MITRAL VALVE REPLACEMENT      PARATHYROIDECTOMY      PARATHYROIDECTOMY  07/13/2017    PERCUTANEOUS TRANSLUMINAL ANGIOPLASTY OF ARTERIOVENOUS FISTULA N/A 1/7/2020    Procedure: PTA, AV FISTULA;  Surgeon: Joseph Loyola MD;  Location: [facility] CATH/EP LAB;  Service: Cardiology;  Laterality: N/A;    PERITONEAL CATHETER INSERTION      RENAL BIOPSY      SMALL BOWEL ENTEROSCOPY N/A 12/2/2020    Procedure: ENTEROSCOPY;  Surgeon: Sudheer Brown III, MD;  Location: [facility] ENDO;  Service: Endoscopy;  Laterality: N/A;    vocal cord nodule      WOUND DEBRIDEMENT Left 7/13/2020    Procedure: DEBRIDEMENT, WOUND;  Surgeon: Carlos Elam III, MD;  Location: [facility] OR;  Service: General;  Laterality: Left;     Family History   Problem Relation Age of Onset    Heart disease Sister     Early death  Sister         heart as baby    Heart disease Maternal Grandfather     Diabetes Mother     Hypertension Mother     Heart failure Mother     Heart disease Mother     Arthritis Mother     Diabetes Father     Early death Sister         infant     Social History     Tobacco Use    Smoking status: Current Some Day Smoker     Packs/day: 0.50     Years: 45.00     Pack years: 22.50     Types: Cigarettes    Smokeless tobacco: Never Used   Substance Use Topics    Alcohol use: No    Drug use: No     Review of Systems   Constitutional: Positive for appetite change and fatigue. Negative for fever.   Respiratory: Positive for cough and shortness of breath. Negative for chest tightness.    Cardiovascular: Negative for chest pain, palpitations and leg swelling.   Gastrointestinal: Negative for abdominal pain, diarrhea, nausea and vomiting.   All other systems reviewed and are negative.      Physical Exam     Initial Vitals [04/29/22 1842]   BP Pulse Resp Temp SpO2   113/71 (!) 56 (!) 22 97.6 °F (36.4 °C) (!) 89 %      MAP       --         Physical Exam    Constitutional: She appears well-developed and well-nourished. She appears distressed.   HENT:   Head: Normocephalic and atraumatic.   Right Ear: External ear normal.   Left Ear: External ear normal.   Mouth/Throat: Oropharynx is clear and moist.   Eyes: Conjunctivae and EOM are normal. Pupils are equal, round, and reactive to light.   Neck: Neck supple.   Normal range of motion.  Cardiovascular: Normal rate, regular rhythm, normal heart sounds and intact distal pulses.   Pulmonary/Chest: She is in respiratory distress. She has wheezes. She has rales.   Abdominal: Abdomen is soft. Bowel sounds are normal. There is no abdominal tenderness.   Musculoskeletal:         General: No edema. Normal range of motion.      Cervical back: Normal range of motion and neck supple.     Neurological: She is alert and oriented to person, place, and time. GCS score is 15. GCS eye  subscore is 4. GCS verbal subscore is 5. GCS motor subscore is 6.   Skin: Skin is warm and dry. Capillary refill takes less than 2 seconds. No rash noted.   Psychiatric: She has a normal mood and affect. Her behavior is normal.         ED Course   Procedures  Labs Reviewed   CBC W/ AUTO DIFFERENTIAL - Abnormal; Notable for the following components:       Result Value    RBC 3.91 (*)     Hemoglobin 11.3 (*)     Hematocrit 36.4 (*)     MCHC 31.0 (*)     RDW 17.4 (*)     Platelets 138 (*)     Immature Granulocytes 0.7 (*)     Lymph # 0.7 (*)     nRBC 2 (*)     Gran % 73.4 (*)     Lymph % 16.9 (*)     All other components within normal limits   COMPREHENSIVE METABOLIC PANEL - Abnormal; Notable for the following components:    Chloride 94 (*)     Glucose 117 (*)     BUN 26 (*)     Creatinine 5.1 (*)     Calcium 8.6 (*)     Albumin 3.4 (*)     Total Bilirubin 1.3 (*)     AST 44 (*)     eGFR if  8.9 (*)     eGFR if non  7.7 (*)     All other components within normal limits   TROPONIN I - Abnormal; Notable for the following components:    Troponin I 0.058 (*)     All other components within normal limits   B-TYPE NATRIURETIC PEPTIDE - Abnormal; Notable for the following components:    BNP >4,500 (*)     All other components within normal limits   LIPASE - Abnormal; Notable for the following components:    Lipase 96 (*)     All other components within normal limits   PROTIME-INR - Abnormal; Notable for the following components:    PT 20.7 (*)     All other components within normal limits   CULTURE, RESPIRATORY   SARS-COV-2 RNA AMPLIFICATION, QUAL   INFLUENZA A AND B ANTIGEN    Narrative:     Specimen Source->Nasopharyngeal Swab   MAGNESIUM   PROTIME-INR   LIPASE          Imaging Results          X-Ray Chest AP Portable (Final result)  Result time 04/29/22 19:27:37    Final result by Yang Chamorro MD (04/29/22 19:27:37)                 Narrative:    Reason: CHF    FINDINGS:    PA and lateral  chest with comparison chest x-ray April 26, 2022 show unchanged enlarged cardiomediastinal silhouette. Right pleural effusion and bilateral perihilar hazy opacities are unchanged.  No pneumothorax. Pulmonary vasculature is normal. No acute osseous abnormality.    IMPRESSION:    No significant change compared with previous exam.    Electronically signed by:  Yang Chamorro DO  4/29/2022 7:27 PM CDT Workstation: [id number]                               Medications   aspirin EC tablet 81 mg (has no administration in time range)   calcium acetate(phosphat bind) capsule 667 mg (has no administration in time range)   cyanocobalamin tablet 1,000 mcg (has no administration in time range)   isosorbide mononitrate 24 hr tablet 30 mg (has no administration in time range)   oxyCODONE-acetaminophen 5-325 mg per tablet 1 tablet (has no administration in time range)   pantoprazole EC tablet 40 mg (has no administration in time range)   Lactobacillus acidoph-L.bulgar 1 million cell tablet 1 tablet (has no administration in time range)   warfarin (COUMADIN) tablet 2.5 mg (has no administration in time range)   sodium chloride 0.9% flush 10 mL (has no administration in time range)   naloxone 0.4 mg/mL injection 0.02 mg (has no administration in time range)   glucose chewable tablet 16 g (has no administration in time range)   glucose chewable tablet 24 g (has no administration in time range)   glucagon (human recombinant) injection 1 mg (has no administration in time range)   dextrose 10% bolus 125 mL (has no administration in time range)   dextrose 10% bolus 250 mL (has no administration in time range)   acetaminophen tablet 650 mg (has no administration in time range)   ondansetron injection 4 mg (has no administration in time range)   melatonin tablet 6 mg (has no administration in time range)   simethicone chewable tablet 80 mg (has no administration in time range)   levalbuterol nebulizer solution 0.63 mg (has no administration  in time range)   ipratropium 0.02 % nebulizer solution 0.5 mg (has no administration in time range)   predniSONE tablet 20 mg (has no administration in time range)   digoxin injection 125 mcg (has no administration in time range)   levalbuterol nebulizer solution 1.25 mg (1.25 mg Nebulization Given 4/29/22 1954)   ipratropium 0.02 % nebulizer solution 1 mg (1 mg Nebulization Given 4/29/22 1954)   acetaminophen tablet 650 mg (650 mg Oral Given 4/29/22 2102)     Medical Decision Making:   ED Management:  Discussed patient's findings with Dr. Alvarado on-call for her nephrologist patient not apparently sit long enough for her dialysis appointment today likely leading to continue volume overload of discussed patient's findings with the hospitalist who will evaluate patient emergency department for admission                      Clinical Impression:   Final diagnoses:  [R06.02] SOB (shortness of breath)  [R06.02] Shortness of breath  [I50.9] Congestive heart failure, unspecified HF chronicity, unspecified heart failure type (Primary)  [N18.6, Z99.2] End stage renal disease on dialysis          ED Disposition Condition    Observation               Gilbert Gutierrez MD  04/30/22 0021

## 2022-04-30 NOTE — ASSESSMENT & PLAN NOTE
She has chronic atrial fibrillation  Continue Coumadin  Daily INR  Adjusting her rate controlled amiodarone  Start with 200 mg t.i.d. for 7 days and then transition to 200 mg b.i.d.

## 2022-04-30 NOTE — PLAN OF CARE
04/30/22 0826   Patient Assessment/Suction   Level of Consciousness (AVPU) alert   Respiratory Effort Unlabored   All Lung Fields Breath Sounds coarse;crackles;wheezes, expiratory   Cough Type nonproductive   PRE-TX-O2   O2 Device (Oxygen Therapy) nasal cannula   $ Is the patient on Low Flow Oxygen? Yes   Flow (L/min) 4  (pt states she is on home o2 at 4-5 LPM)   SpO2   (unable to obtain at this time)   Pulse (!) 117   Resp 20   Aerosol Therapy   $ Aerosol Therapy Charges Aerosol Treatment   Respiratory Treatment Status (SVN) given   Treatment Route (SVN) mask   Patient Position (SVN) HOB elevated   Post Treatment Assessment (SVN) increased aeration   Signs of Intolerance (SVN) none   Breath Sounds Post-Respiratory Treatment   Post-treatment Heart Rate (beats/min) 121   Post-treatment Resp Rate (breaths/min) 20   Respiratory Evaluation   $ Care Plan Tech Time 15 min

## 2022-04-30 NOTE — PLAN OF CARE
04/30/22 1353   CORTEZ Message   Medicare Outpatient and Observation Notification regarding financial responsibility Given to patient/caregiver;Explained to patient/caregiver;Signed/date by patient/caregiver   Date CORTEZ was signed 04/30/22   Time CORTEZ was signed 1343

## 2022-04-30 NOTE — SUBJECTIVE & OBJECTIVE
Past Medical History:   Diagnosis Date    Anemia     Atrial fibrillation     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    both legs    CHF (congestive heart failure)     Diverticulitis 11/2021    Encounter for blood transfusion 03/2016    Gout     Hemodialysis access, AV graft     Hypertension     Mitral valve regurgitation     Osteoarthritis     Pancreatitis     Peripheral vascular disease     Pneumonia 09/09/2017    Renal failure        Past Surgical History:   Procedure Laterality Date    ACHILLES TENDON SURGERY Right     ANGIOGRAPHY OF ARTERIOVENOUS SHUNT Right 1/7/2020    Procedure: Fistulogram with Possible Intervention;  Surgeon: Jsoeph Loyola MD;  Location: [facility] CATH/EP LAB;  Service: Cardiology;  Laterality: Right;    ANGIOGRAPHY OF LOWER EXTREMITY Left 3/18/2020    Procedure: Angiogram Extremity Unilateral;  Surgeon: Ali Khoobehi, MD;  Location: [facility] CATH/EP LAB;  Service: Cardiology;  Laterality: Left;    APPENDECTOMY      CARDIAC CATHETERIZATION  07/03/2017    CHOLECYSTECTOMY      COLONOSCOPY Left 10/4/2020    Procedure: COLONOSCOPY;  Surgeon: Jordan Kilpatrick MD;  Location: [facility] ENDO;  Service: Endoscopy;  Laterality: Left;    ESOPHAGOGASTRODUODENOSCOPY Left 8/17/2020    Procedure: EGD (ESOPHAGOGASTRODUODENOSCOPY);  Surgeon: Talat Trevizo MD;  Location: [facility];  Service: Endoscopy;  Laterality: Left;    ESOPHAGOGASTRODUODENOSCOPY Left 10/2/2020    Procedure: EGD (ESOPHAGOGASTRODUODENOSCOPY);  Surgeon: Talat Trevizo MD;  Location: [facility];  Service: Endoscopy;  Laterality: Left;    ESOPHAGOGASTRODUODENOSCOPY N/A 6/29/2021    Procedure: EGD (ESOPHAGOGASTRODUODENOSCOPY);  Surgeon: Sudheer Brown III, MD;  Location: [facility];  Service: Endoscopy;  Laterality: N/A;    FISTULOGRAM Bilateral 4/11/2022    Procedure: FISTULOGRAM;  Surgeon: Joseph Loyola MD;  Location: [facility] CATH/EP LAB;  Service: General;  Laterality: Bilateral;    heal surgery Right     HYSTERECTOMY      INSERTION OF STENT INTO PERIPHERAL  VESSEL N/A 1/7/2020    Procedure: INSERTION, STENT, VESSEL, PERIPHERAL;  Surgeon: Joseph Loyola MD;  Location: [facility] CATH/EP LAB;  Service: Cardiology;  Laterality: N/A;    MITRAL VALVE REPLACEMENT      PARATHYROIDECTOMY      PARATHYROIDECTOMY  07/13/2017    PERCUTANEOUS TRANSLUMINAL ANGIOPLASTY OF ARTERIOVENOUS FISTULA N/A 1/7/2020    Procedure: PTA, AV FISTULA;  Surgeon: Joseph Loyola MD;  Location: [facility] CATH/EP LAB;  Service: Cardiology;  Laterality: N/A;    PERITONEAL CATHETER INSERTION      RENAL BIOPSY      SMALL BOWEL ENTEROSCOPY N/A 12/2/2020    Procedure: ENTEROSCOPY;  Surgeon: Sudheer Brown III, MD;  Location: [facility] ENDO;  Service: Endoscopy;  Laterality: N/A;    vocal cord nodule      WOUND DEBRIDEMENT Left 7/13/2020    Procedure: DEBRIDEMENT, WOUND;  Surgeon: Carlos Elam III, MD;  Location: [facility] OR;  Service: General;  Laterality: Left;       Review of patient's allergies indicates:  No Known Allergies    No current facility-administered medications on file prior to encounter.     Current Outpatient Medications on File Prior to Encounter   Medication Sig    albuterol (ACCUNEB) 1.25 mg/3 mL Nebu Take 1.25 mg by nebulization every 6 (six) hours as needed. Rescue  HCS    albuterol (PROVENTIL/VENTOLIN HFA) 90 mcg/actuation inhaler Inhale 1 puff into the lungs every 4 (four) hours as needed for Wheezing. rescue    aspirin (ECOTRIN) 81 MG EC tablet Take 81 mg by mouth once daily.    calcium acetate,phosphat bind, (PHOSLO) 667 mg capsule Take 667 mg by mouth 3 (three) times daily with meals.    cyanocobalamin (VITAMIN B-12) 1000 MCG tablet Take 1 tablet (1,000 mcg total) by mouth once daily.    diltiaZEM (CARDIZEM) 30 MG tablet Take 1 tablet (30 mg total) by mouth 4 (four) times daily before meals and nightly.    heparin sodium,porcine (HEPARIN, PORCINE,) 10,000 unit/mL injection Heparin Sodium (Porcine) 1,000 Units/mL Systemic    isosorbide mononitrate (IMDUR) 30 MG 24 hr tablet Take 1 tablet  (30 mg total) by mouth once daily.    LORazepam (ATIVAN) 0.5 MG tablet Take 1 tablet (0.5 mg total) by mouth every 6 (six) hours as needed for Anxiety.    losartan (COZAAR) 25 MG tablet Take 1 tablet (25 mg total) by mouth every evening.    metoprolol succinate (TOPROL-XL) 25 MG 24 hr tablet Take 1 tablet by mouth nightly    montelukast (SINGULAIR) 10 mg tablet Take 10 mg by mouth every evening.    ondansetron (ZOFRAN) 4 MG tablet Take 1 tablet (4 mg total) by mouth every 6 (six) hours as needed for Nausea.    oxyCODONE-acetaminophen (PERCOCET) 5-325 mg per tablet Take 1 tablet by mouth every 4 (four) hours as needed for Pain.    pantoprazole (PROTONIX) 40 MG tablet Take 1 tablet by mouth once daily    Saccharomyces boulardii (FLORASTOR) 250 mg capsule Take 1 capsule (250 mg total) by mouth with lunch.    Saccharomyces boulardii (FLORASTOR) 250 mg capsule 1 capsule DAILY (route: oral)    sodium chloride 0.9% SolP 100 mL with iron sucrose 100 mg iron/5 mL Soln 100 mg 50 mg.    VITAMIN B COMPLEX ORAL 1 tablet DAILY (route: oral)    warfarin (COUMADIN) 2.5 MG tablet Take 2.5 mg by mouth Daily.    [DISCONTINUED] hydrALAZINE (APRESOLINE) 50 MG tablet Take 1 tablet (50 mg total) by mouth every 8 (eight) hours.     Family History       Problem Relation (Age of Onset)    Arthritis Mother    Diabetes Mother, Father    Early death Sister, Sister    Heart disease Sister, Maternal Grandfather, Mother    Heart failure Mother    Hypertension Mother          Tobacco Use    Smoking status: Current Some Day Smoker     Packs/day: 0.50     Years: 45.00     Pack years: 22.50     Types: Cigarettes    Smokeless tobacco: Never Used   Substance and Sexual Activity    Alcohol use: No    Drug use: No    Sexual activity: Not Currently     Review of Systems   Constitutional:  Positive for fatigue. Negative for chills and fever.   HENT:  Negative for sore throat.    Eyes:  Negative for redness and itching.   Respiratory:  Positive for cough,  shortness of breath and wheezing. Negative for chest tightness.    Cardiovascular:  Positive for leg swelling. Negative for chest pain and palpitations.   Gastrointestinal:  Negative for blood in stool and nausea.   Genitourinary:  Negative for dysuria.   Musculoskeletal:  Negative for gait problem and joint swelling.   Skin:  Positive for rash and wound.   Neurological:  Negative for tremors and weakness.   Psychiatric/Behavioral:  Negative for behavioral problems and sleep disturbance.    All other systems reviewed and are negative.  Objective:     Vital Signs (Most Recent):  Temp: 97.6 °F (36.4 °C) (04/29/22 1842)  Pulse: 104 (04/29/22 1954)  Resp: (!) 25 (04/29/22 1954)  BP: 113/71 (04/29/22 1842)  SpO2: 97 % (04/29/22 1954)   Vital Signs (24h Range):  Temp:  [97.6 °F (36.4 °C)] 97.6 °F (36.4 °C)  Pulse:  [] 104  Resp:  [22-28] 25  SpO2:  [89 %-97 %] 97 %  BP: (113)/(71) 113/71     Weight: 60.5 kg (133 lb 6.1 oz)  Body mass index is 22.2 kg/m².    Physical Exam  Vitals and nursing note reviewed.   Constitutional:       Appearance: She is well-developed. She is ill-appearing.      Comments: Chronically ill-appearing   HENT:      Head: Atraumatic.      Mouth/Throat:      Mouth: Mucous membranes are dry.   Neck:      Vascular: No JVD.   Cardiovascular:      Rate and Rhythm: Tachycardia present. Rhythm irregular.      Heart sounds: Murmur heard.     No gallop.   Pulmonary:      Effort: No respiratory distress.      Breath sounds: Wheezing present.      Comments: Bilateral basal crackles noted, bilateral wheezing noted, on supplemental oxygen  Abdominal:      General: Bowel sounds are normal. There is no distension.      Palpations: Abdomen is soft.      Tenderness: There is no guarding or rebound.   Musculoskeletal:      Cervical back: Neck supple.      Comments: Bilateral chronic lower extremity skin changes   Lymphadenopathy:      Cervical: No cervical adenopathy.   Skin:     General: Skin is warm.       Capillary Refill: Capillary refill takes less than 2 seconds.      Findings: No rash.   Neurological:      General: No focal deficit present.      Mental Status: She is alert and oriented to person, place, and time.      Cranial Nerves: No cranial nerve deficit.           Significant Labs: All pertinent labs within the past 24 hours have been reviewed.    Significant Imaging: I have reviewed all pertinent imaging results/findings within the past 24 hours.

## 2022-04-30 NOTE — CONSULTS
[facility]  Nephrology  Consult Note    Patient Name: Griselda Neely  MRN: [id number]  Admission Date: 4/29/2022  Hospital Length of Stay: 0 days  Attending Provider: Robson Kaiser MD   Primary Care Physician: Denver Cerna NP  Principal Problem:Acute diastolic congestive heart failure    Consults   Reason for Consultation; ESRD and Hemodialysis Management   Subjective:     HPI:  77 y/o AAF female with a history of ESRD (MWF outpatient schedule at  [facility]), atrial fibrillation, CHF, COPD on home oxygen treatment, recurrent/chronic pancreatitis, aortic stenosis s/p TAVR - who was just recently discharged (on 4/28) but returns due to worsening SOB and hypervolemia.   Nephrology was consulted for management of ESRD and hemodialysis.     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    both legs    CHF (congestive heart failure)     Diverticulitis 11/2021    Encounter for blood transfusion 03/2016    Gout     Hemodialysis access, AV graft     Hypertension     Mitral valve regurgitation     Osteoarthritis     Pancreatitis     Peripheral vascular disease     Pneumonia 09/09/2017    Renal failure        Past Surgical History:   Procedure Laterality Date    ACHILLES TENDON SURGERY Right     ANGIOGRAPHY OF ARTERIOVENOUS SHUNT Right 1/7/2020    Procedure: Fistulogram with Possible Intervention;  Surgeon: Joseph Loyola MD;  Location: [facility] CATH/EP LAB;  Service: Cardiology;  Laterality: Right;    ANGIOGRAPHY OF LOWER EXTREMITY Left 3/18/2020    Procedure: Angiogram Extremity Unilateral;  Surgeon: Ali Khoobehi, MD;  Location: [facility] CATH/EP LAB;  Service: Cardiology;  Laterality: Left;    APPENDECTOMY      CARDIAC CATHETERIZATION  07/03/2017    CHOLECYSTECTOMY      COLONOSCOPY Left 10/4/2020    Procedure: COLONOSCOPY;  Surgeon: Jordan Kilpatrick MD;  Location: [facility] ENDO;  Service: Endoscopy;  Laterality: Left;     ESOPHAGOGASTRODUODENOSCOPY Left 8/17/2020    Procedure: EGD (ESOPHAGOGASTRODUODENOSCOPY);  Surgeon: Talat Trevizo MD;  Location: [facility] ENDO;  Service: Endoscopy;  Laterality: Left;    ESOPHAGOGASTRODUODENOSCOPY Left 10/2/2020    Procedure: EGD (ESOPHAGOGASTRODUODENOSCOPY);  Surgeon: Talat Trevizo MD;  Location: [facility] ENDO;  Service: Endoscopy;  Laterality: Left;    ESOPHAGOGASTRODUODENOSCOPY N/A 6/29/2021    Procedure: EGD (ESOPHAGOGASTRODUODENOSCOPY);  Surgeon: Sudheer Brown III, MD;  Location: [facility] ENDO;  Service: Endoscopy;  Laterality: N/A;    FISTULOGRAM Bilateral 4/11/2022    Procedure: FISTULOGRAM;  Surgeon: Joseph Loyola MD;  Location: [facility] CATH/EP LAB;  Service: General;  Laterality: Bilateral;    heal surgery Right     HYSTERECTOMY      INSERTION OF STENT INTO PERIPHERAL VESSEL N/A 1/7/2020    Procedure: INSERTION, STENT, VESSEL, PERIPHERAL;  Surgeon: Joseph Loyola MD;  Location: [facility] CATH/EP LAB;  Service: Cardiology;  Laterality: N/A;    MITRAL VALVE REPLACEMENT      PARATHYROIDECTOMY      PARATHYROIDECTOMY  07/13/2017    PERCUTANEOUS TRANSLUMINAL ANGIOPLASTY OF ARTERIOVENOUS FISTULA N/A 1/7/2020    Procedure: PTA, AV FISTULA;  Surgeon: Joseph Loyola MD;  Location: [facility] CATH/EP LAB;  Service: Cardiology;  Laterality: N/A;    PERITONEAL CATHETER INSERTION      RENAL BIOPSY      SMALL BOWEL ENTEROSCOPY N/A 12/2/2020    Procedure: ENTEROSCOPY;  Surgeon: Sudheer Brown III, MD;  Location: [facility] ENDO;  Service: Endoscopy;  Laterality: N/A;    vocal cord nodule      WOUND DEBRIDEMENT Left 7/13/2020    Procedure: DEBRIDEMENT, WOUND;  Surgeon: Carlos Elam III, MD;  Location: [facility] OR;  Service: General;  Laterality: Left;       Review of patient's allergies indicates:  No Known Allergies  Current Facility-Administered Medications   Medication Frequency    acetaminophen tablet 650 mg Q4H PRN    amiodarone tablet 200 mg TID    aspirin EC tablet 81 mg Daily    calcium  acetate(phosphat bind) capsule 667 mg TID WM    cyanocobalamin tablet 1,000 mcg Daily    dextrose 10% bolus 125 mL PRN    dextrose 10% bolus 250 mL PRN    glucagon (human recombinant) injection 1 mg PRN    glucose chewable tablet 16 g PRN    glucose chewable tablet 24 g PRN    ipratropium 0.02 % nebulizer solution 0.5 mg Q8H    isosorbide mononitrate 24 hr tablet 30 mg Daily    Lactobacillus acidoph-L.bulgar 1 million cell tablet 1 tablet TID WM    levalbuterol nebulizer solution 0.63 mg Q8H    LORazepam tablet 0.5 mg Q6H PRN    melatonin tablet 6 mg Nightly PRN    mupirocin 2 % ointment BID    naloxone 0.4 mg/mL injection 0.02 mg PRN    ondansetron injection 4 mg Q8H PRN    oxyCODONE-acetaminophen 5-325 mg per tablet 1 tablet Q4H PRN    pantoprazole EC tablet 40 mg Before breakfast    predniSONE tablet 20 mg Daily    simethicone chewable tablet 80 mg QID PRN    sodium chloride 0.9% flush 10 mL Q12H PRN    warfarin (COUMADIN) tablet 2.5 mg Daily     Family History     Problem Relation (Age of Onset)    Arthritis Mother    Diabetes Mother, Father    Early death Sister, Sister    Heart disease Sister, Maternal Grandfather, Mother    Heart failure Mother    Hypertension Mother        Tobacco Use    Smoking status: Current Some Day Smoker     Packs/day: 0.50     Years: 45.00     Pack years: 22.50     Types: Cigarettes    Smokeless tobacco: Never Used   Substance and Sexual Activity    Alcohol use: No    Drug use: No    Sexual activity: Not Currently     Review of Systems   Constitutional:  (+) Weakness and fatigue   HENT:  Negative for sore throat.    Eyes:  Negative for redness and itching.   Respiratory: (+) Cough, SOB   Cardiovascular:  (-) CP, palpitations. (+) LE/ dependent cirilo,a   Gastrointestinal:  Negative for blood in stool and nausea.   Genitourinary:  Negative for dysuria.   Musculoskeletal:  Negative for gait problem and joint swelling.   Skin: (+) Wound   Neurological:  Negative  for tremors and weakness.   Psychiatric/Behavioral:  Negative for behavioral problems and sleep disturbance.    All other systems reviewed and are negative.    Objective:     Vital Signs (Most Recent):  Temp: 97 °F (36.1 °C) (04/30/22 0930)  Pulse: (!) 57 (04/30/22 1100)  Resp: 20 (04/30/22 0930)  BP: 102/76 (04/30/22 1100)  SpO2: 98 % (04/30/22 0930)  O2 Device (Oxygen Therapy): nasal cannula (04/30/22 0826) Vital Signs (24h Range):  Temp:  [97 °F (36.1 °C)-98.1 °F (36.7 °C)] 97 °F (36.1 °C)  Pulse:  [] 57  Resp:  [16-28] 20  SpO2:  [89 %-100 %] 98 %  BP: ()/(55-78) 102/76     Weight: 56.6 kg (124 lb 12.8 oz) (04/30/22 0042)  Body mass index is 20.77 kg/m².  Body surface area is 1.61 meters squared.    I/O last 3 completed shifts:  In: 120 [P.O.:120]  Out: -     Physical Exam  Vitals and nursing note reviewed.   Constitutional:       General: She is not in acute distress.     Appearance: She is ill-appearing. She is not toxic-appearing or diaphoretic.   HENT:      Head: Normocephalic and atraumatic.      Mouth/Throat:      Mouth: Mucous membranes are dry.      Pharynx: Oropharynx is clear. No oropharyngeal exudate or posterior oropharyngeal erythema.   Eyes:      Extraocular Movements: Extraocular movements intact.      Pupils: Pupils are equal, round, and reactive to light.   Cardiovascular:      Rate and Rhythm: Tachycardia present. Rhythm irregular.      Heart sounds: No murmur heard.    No friction rub.      Comments: Afib   Pulmonary:      Effort: No respiratory distress     Breath sounds: No wheezing or rales.      Comments: On 4L nasal cannula  Abdominal:      General: There is no distension.      Tenderness: There is no abdominal tenderness   Musculoskeletal:         General: No swelling.      Cervical back: Normal range of motion.      Right lower leg: No edema.      Left lower leg: No edema.   Lymphadenopathy:      Cervical: No cervical adenopathy.   Skin:     General: Skin is dry.       Coloration: Skin is not jaundiced.   Neurological:      General: No focal deficit present.      Mental Status: She is oriented to person, place, and time.   Psychiatric:         Mood and Affect: Mood normal.         Behavior: Behavior normal.    Access:      LUE AVF with good systolic thrill, + bruit on ascultation, normal pulse augmentation    Significant Labs:  BMP:   Recent Labs   Lab 04/29/22 2040 04/30/22  0329   * 102    137   K 4.0 3.7   CL 94* 95   CO2 26 25   BUN 26* 28*   CREATININE 5.1* 5.5*   CALCIUM 8.6* 8.5*   MG 1.8  --      CBC:   Recent Labs   Lab 04/30/22 0329   WBC 4.37   RBC 3.70*   HGB 11.0*   HCT 34.5*   *   MCV 93   MCH 29.7   MCHC 31.9*     CMP:   Recent Labs   Lab 04/30/22 0329      CALCIUM 8.5*   ALBUMIN 3.2*   PROT 7.8      K 3.7   CO2 25   CL 95   BUN 28*   CREATININE 5.5*   ALKPHOS 97   ALT 27   AST 36   BILITOT 1.3*     All labs within the past 24 hours have been reviewed.    Significant Imaging:  CXR 4/29:  FINDINGS:     PA and lateral chest with comparison chest x-ray April 26, 2022 show unchanged enlarged cardiomediastinal silhouette. Right pleural effusion and bilateral perihilar hazy opacities are unchanged.   No pneumothorax. Pulmonary vasculature is normal. No acute osseous abnormality.     IMPRESSION:   No significant change compared with previous exam.     Assessment/Plan:     Active Diagnoses:    Diagnosis Date Noted POA    PRINCIPAL PROBLEM:  Acute diastolic congestive heart failure [I50.31] 04/27/2022 Yes    COPD exacerbation [J44.1] 04/29/2022 Yes    History of pancreatitis [Z87.19] 01/24/2022 Not Applicable     Chronic    Long term current use of anticoagulant [Z79.01] 09/05/2020 Not Applicable     Chronic    H/O mitral valve replacement [Z95.2] 03/17/2020 Not Applicable     Chronic    Left atrial thrombus [I51.3] 01/03/2020 Yes    PVD (peripheral vascular disease) [I73.9] 12/20/2019 Yes     Chronic    DNR (do not resuscitate)  [Z66] 09/30/2019 Yes     Chronic    COPD (chronic obstructive pulmonary disease) [J44.9] 09/29/2019 Yes     Chronic    Pulmonary hypertension [I27.20] 07/25/2017 Yes     Chronic    Non-rheumatic mitral regurgitation [I34.0] 07/25/2017 Yes    Longstanding persistent atrial fibrillation [I48.11] 07/18/2017 Yes    Coronary arteriosclerosis in native artery [I25.10] 04/04/2017 Yes    ESRD (end stage renal disease) on HD M,W, F [N18.6] 05/11/2016 Yes     Chronic    Gastroesophageal reflux disease [K21.9] 02/02/2016 Yes    Tobacco dependence [F17.200] 09/09/2013 Yes     Chronic    HTN (hypertension) [I10] 08/15/2013 Yes     Chronic    PND (paroxysmal nocturnal dyspnea) [R06.00] 08/15/2013 Yes      Problems Resolved During this Admission:     ESRD on iHD  -MWF outpatient schedule  -CXR with central vascular prominence in addition has recurrent pleural effusions and ascites on exam  -patient was seen and examined on iHD multiple times - UF goals decreased due to relative hypotension. Patient stopped treatment approx 1 hr early   -SOB/ symptoms improving with iHD UF      Shortness of breath  -COPD on chronic home O2 via [place]   -Afib rate controlled  -on baseline level of home O2  -clinically improving with RRT (UF as tolerated)      Atrial fibrillation  -borderline bradycardia - home Metprolol and Diltiazem held at this time    Hypotension  -home HTN meds initially held and UF goals adjusted on iHD   -will titrate regimen as indicated      Anemia:  -Hgb at goal   -no indications for DONNY at this time     Renal BMD  -serum Ca at goal when corrected for hypoalbuminemia   -serum Phos at goal   - and vit D 30. On outpatient/ clinic labs on 4/11/22.      Will continue to follow, please call with any questions or problems     Javier Santiago, DO  Nephrology  [facility]

## 2022-04-30 NOTE — PLAN OF CARE
[facility], later [facility]  Initial Discharge Assessment       Primary Care Provider: Denver Cerna NP    Admission Diagnosis: SOB (shortness of breath) [R06.02]    Admission Date: 4/29/2022  Expected Discharge Date:      Assessment completed: At bedside with patient  Advanced Directives: None  Marital Status:   Children: 1 Adult  Next of Kin: Son    Needs: None    Discharge Plan: Home with sisters. [facility]     CM  will continue to follow.        Discharge Barriers Identified: None    Payor: [employer] MANAGED MEDICARE / Plan: [employer] MEDICARE HMO / Product Type: Capitation /     Extended Emergency Contact Information  Primary Emergency Contact: WatsonAide venegas  Address: [address] [place] [address] [place]  Mobile Phone: [phone]  Relation: Sister  Secondary Emergency Contact: Elvi Hi  Address: UNKNOWN   [place]  Mobile Phone: [phone]  Relation: Sister    Discharge Plan A: Home with family, Home Health  Discharge Plan B: Home with family, Home Health      [facility] [address] [place], [place] - [address]  [address]  [facility] [address]  Phone: [phone] Fax: [phone]    [facility] - [facility] [address]  [address] [facility] [address]  Phone: [phone] Fax: [phone]    [facility]  [address]  [facility] [address]  Phone: [phone] Fax: [phone]      Initial Assessment (most recent)     Adult Discharge Assessment - 04/30/22 1341        Discharge Assessment    Assessment Type Discharge Planning Assessment     Confirmed/corrected address, phone number and insurance Yes     Confirmed Demographics Correct on Facesheet     Source of Information patient     When was your last doctors appointment? --   Patient does not remember    Does patient/caregiver understand observation status Yes     Communicated SIENNA with  patient/caregiver Date not available/Unable to determine     Lives With sibling(s)     Do you expect to return to your current living situation? Yes   Home    Do you have help at home or someone to help you manage your care at home? Yes     Who are your caregiver(s) and their phone number(s)? Aide Watson (sister) [phone]     Prior to hospitilization cognitive status: Alert/Oriented     Current cognitive status: Alert/Oriented     Walking or Climbing Stairs Difficulty ambulation difficulty, requires equipment     Dressing/Bathing Difficulty none     Home Accessibility wheelchair accessible     Home Layout Able to live on 1st floor     Equipment Currently Used at Home walker, rolling;hospital bed;bedside commode;cane, straight;bath bench     Readmission within 30 days? Yes     Patient currently being followed by outpatient case management? No     Do you currently have service(s) that help you manage your care at home? Yes     Name and Contact number of agency [facility]   [phone]     Is the pt/caregiver preference to resume services with current agency Yes     Do you take prescription medications? Yes     Do you have prescription coverage? Yes     Coverage [employer]     Do you have any problems affording any of your prescribed medications? No     Is the patient taking medications as prescribed? yes     Who is going to help you get home at discharge? Sister     How do you get to doctors appointments? family or friend will provide     Are you on dialysis? Yes     Dialysis Name and Scheduled days [facility] MWF     Do you take coumadin? Yes     Who monitors your labs? [facility]     Discharge Plan A Home with family;Home Health     Discharge Plan B Home with family;Home Health     DME Needed Upon Discharge  none     Discharge Plan discussed with: Patient     Discharge Barriers Identified None

## 2022-05-01 NOTE — CARE UPDATE
04/30/22 2331   Patient Assessment/Suction   Level of Consciousness (AVPU) alert   Respiratory Effort Unlabored;Normal   Expansion/Accessory Muscles/Retractions no use of accessory muscles   All Lung Fields Breath Sounds coarse;crackles   Rhythm/Pattern, Respiratory unlabored;pattern regular   Cough Frequency infrequent   PRE-TX-O2   O2 Device (Oxygen Therapy) nasal cannula w/ humidification   $ Is the patient on Low Flow Oxygen? Yes   Flow (L/min) 4   SpO2 98 %   Pulse Oximetry Type Intermittent   $ Pulse Oximetry - Multiple Charge Pulse Oximetry - Multiple   Pulse 97   Resp 18   Aerosol Therapy   $ Aerosol Therapy Charges Aerosol Treatment   Daily Review of Necessity (SVN) completed   Respiratory Treatment Status (SVN) given   Treatment Route (SVN) mask;oxygen   Patient Position (SVN) HOB elevated   Post Treatment Assessment (SVN) breath sounds unchanged   Signs of Intolerance (SVN) none   Breath Sounds Post-Respiratory Treatment   Post-treatment Heart Rate (beats/min) 101   Post-treatment Resp Rate (breaths/min) 18   Education   $ Education Bronchodilator;15 min   Respiratory Evaluation   $ Care Plan Tech Time 15 min   $ Eval/Re-eval Charges Evaluation

## 2022-05-01 NOTE — PLAN OF CARE
05/01/22 1528   Final Note   Assessment Type Final Discharge Note   Anticipated Discharge Disposition Home-Health   What phone number can be called within the next 1-3 days to see how you are doing after discharge? [phone]   Hospital Resources/Appts/Education Provided Post-Acute resouces added to AVS   Post-Acute Status   Post-Acute Authorization Home Health   Home Health Status Set-up Complete/Auth obtained  (Resuming HH with [facility]. No order is need because patient is in observation status. peter Long RN on call with the agency was informed and will set up a visit for 5/2/22.)   Discharge Delays (!) Personal Transportation       Discharge orders reviewed. No additional case management/discharge planning needs noted.

## 2022-05-01 NOTE — PLAN OF CARE
Problem: Adult Inpatient Plan of Care  Goal: Plan of Care Review  Outcome: Adequate for Care Transition  Goal: Patient-Specific Goal (Individualized)  Outcome: Adequate for Care Transition  Goal: Absence of Hospital-Acquired Illness or Injury  Outcome: Adequate for Care Transition  Goal: Optimal Comfort and Wellbeing  Outcome: Adequate for Care Transition  Goal: Readiness for Transition of Care  Outcome: Adequate for Care Transition     Problem: Fluid Imbalance (Pneumonia)  Goal: Fluid Balance  Outcome: Adequate for Care Transition     Problem: Infection (Pneumonia)  Goal: Resolution of Infection Signs and Symptoms  Outcome: Adequate for Care Transition     Problem: Respiratory Compromise (Pneumonia)  Goal: Effective Oxygenation and Ventilation  Outcome: Adequate for Care Transition     Problem: Fall Injury Risk  Goal: Absence of Fall and Fall-Related Injury  Outcome: Adequate for Care Transition     Problem: Hemodynamic Instability (Hemodialysis)  Goal: Effective Tissue Perfusion  Outcome: Adequate for Care Transition     Problem: Infection (Hemodialysis)  Goal: Absence of Infection Signs and Symptoms  Outcome: Adequate for Care Transition

## 2022-05-01 NOTE — PLAN OF CARE
Problem: Adult Inpatient Plan of Care  Goal: Plan of Care Review  Outcome: Ongoing, Progressing     Problem: Fluid Imbalance (Pneumonia)  Goal: Fluid Balance  Outcome: Ongoing, Progressing     Problem: Infection (Pneumonia)  Goal: Resolution of Infection Signs and Symptoms  Outcome: Ongoing, Progressing     Problem: Fall Injury Risk  Goal: Absence of Fall and Fall-Related Injury  Outcome: Ongoing, Progressing

## 2022-05-01 NOTE — HOSPITAL COURSE
5/1 Dr alvarado assumed pts care . Patient gets HD MWF . She states she feels baseline.  Patient normally uses 4 L of O2 at home.  Will discharge patient home today if cleared by Nephrology.

## 2022-05-01 NOTE — PLAN OF CARE
05/01/22 0818   Patient Assessment/Suction   Level of Consciousness (AVPU) alert   Respiratory Effort Unlabored;Normal   Expansion/Accessory Muscles/Retractions no use of accessory muscles   All Lung Fields Breath Sounds coarse;crackles   Rhythm/Pattern, Respiratory unlabored;pattern regular;depth regular   Cough Frequency infrequent   Cough Type loose;productive;good   Secretions Amount moderate   Secretions Color white   Secretions Characteristics thick   PRE-TX-O2   O2 Device (Oxygen Therapy) nasal cannula   $ Is the patient on Low Flow Oxygen? Yes   Flow (L/min) 4   SpO2 (!) 92 %   Pulse Oximetry Type Intermittent   $ Pulse Oximetry - Multiple Charge Pulse Oximetry - Multiple   Oximetry Probe Site Applied   Pulse 99   Resp 18   Aerosol Therapy   $ Aerosol Therapy Charges Aerosol Treatment   Daily Review of Necessity (SVN) completed   Respiratory Treatment Status (SVN) given   Treatment Route (SVN) mask;oxygen   Patient Position (SVN) HOB elevated   Post Treatment Assessment (SVN) breath sounds improved   Signs of Intolerance (SVN) none   Breath Sounds Post-Respiratory Treatment   Throughout All Fields Post-Treatment All Fields   Throughout All Fields Post-Treatment aeration increased   Post-treatment Heart Rate (beats/min) 99   Post-treatment Resp Rate (breaths/min) 16   Education   $ Education Bronchodilator;15 min   Respiratory Evaluation   $ Care Plan Tech Time 15 min   $ Eval/Re-eval Charges Re-evaluation

## 2022-05-01 NOTE — DISCHARGE SUMMARY
[facility] Medicine  Discharge Summary      Patient Name: Griselda Neely  MRN: [id number]  Patient Class: OP- Observation  Admission Date: 4/29/2022  Hospital Length of Stay: 0 days  Discharge Date and Time:  05/01/2022 2:45 PM  Attending Physician: Basil Velasquez DO   Discharging Provider: Basil Velasquez DO  Primary Care Provider: Denver Cerna NP      HPI:   74 y.o. female with numerous medical problems including but not limited to ESRD on hemodialysis, diastolic CHF, hypertension, longstanding AFib, Anemia, Calciphylaxis (07/2017), diastolic, Encounter for blood transfusion (03/2016), Gout, Hemodialysis access, AV graft, Hypertension, Mitral valve regurgitation, Osteoarthritis, Pancreatitis, Peripheral vascular disease, mitral valve replacement on Coumadin with numerous ER visits and hospital admissions who was just discharged from our service yesterday when she was admitted for hypotension, hypervolemia came back with chief complaint of shortness of breath.  Upon further asking family tells me that she did not get full run of dialysis today and later in the evening home health nurse noticed her blood pressure was low, heart rate was very high and oxygen saturation was low and she was brought to emergency room.  Patient continues to smoke a frequently takes her oxygen off.  Otherwise patient herself denies any fever, chills, headache, dizziness, chest pain, palpitations, cough, nausea, vomiting, bladder or bowel symptoms.     In ED she was found to have borderline hypotension however she was completely asymptomatic, AFib with intermittent heart rate up to 110. She was found to have mild hypervolemia and mild COPD exacerbation.       * No surgery found *      Hospital Course:    5/1 Dr velasquez assumed pts care . Patient gets HD MWF . She states she feels baseline.  Patient normally uses 4 L of O2 at home.  Will discharge patient home today if cleared by Nephrology.     check INR  tomorrow   BP meds held in hospital and BP still acceptable /.Patient  was hypotensive on admission .   BP meds held at discharge ; BP will be followed at dialysis and checked tomorrow    Goals of Care Treatment Preferences:  Code Status: DNR      Consults:   Consults (From admission, onward)        Status Ordering Provider     Inpatient consult to Hospitalist  Once        Provider:  Cristian Aragon MD    Acknowledged CRISTIAN ARAGON     Inpatient consult to Nephrology  Once        Provider:  Joseph Ingram MD    Acknowledged CRISTIAN ARAGON          No new Assessment & Plan notes have been filed under this hospital service since the last note was generated.  Service: Hospital Medicine    Final Active Diagnoses:    Diagnosis Date Noted POA    PRINCIPAL PROBLEM:  Acute diastolic congestive heart failure [I50.31] 04/27/2022 Yes    COPD exacerbation [J44.1] 04/29/2022 Yes    History of pancreatitis [Z87.19] 01/24/2022 Not Applicable     Chronic    Chronic hypoxemic respiratory failure [J96.11] 11/20/2021 Yes     Chronic    Atrial fibrillation [I48.91] 10/13/2021 Yes     Chronic    Long term current use of anticoagulant [Z79.01] 09/05/2020 Not Applicable     Chronic    H/O mitral valve replacement [Z95.2] 03/17/2020 Not Applicable     Chronic    Left atrial thrombus [I51.3] 01/03/2020 Yes    PVD (peripheral vascular disease) [I73.9] 12/20/2019 Yes     Chronic    DNR (do not resuscitate) [Z66] 09/30/2019 Yes     Chronic    COPD (chronic obstructive pulmonary disease) [J44.9] 09/29/2019 Yes     Chronic    Pulmonary hypertension [I27.20] 07/25/2017 Yes     Chronic    Non-rheumatic mitral regurgitation [I34.0] 07/25/2017 Yes    Longstanding persistent atrial fibrillation [I48.11] 07/18/2017 Yes    Coronary arteriosclerosis in native artery [I25.10] 04/04/2017 Yes    ESRD (end stage renal disease) on HD M,W, F [N18.6] 05/11/2016 Yes     Chronic    Gastroesophageal reflux disease [K21.9] 02/02/2016  Yes    Tobacco dependence [F17.200] 09/09/2013 Yes     Chronic    HTN (hypertension) [I10] 08/15/2013 Yes     Chronic    PND (paroxysmal nocturnal dyspnea) [R06.00] 08/15/2013 Yes      Problems Resolved During this Admission:       Discharged Condition:  Patient appears no distress  Lying in bed  Lung symmetrical expansion good air movement no wheeze  Heart regular rate rhythm  Neuro patient is alert oriented x3 motor exam is nonfocal    Disposition: Home or Self Care   Home    Follow Up:   Contact information for follow-up providers     Denver Cerna NP Follow up.    Specialty: Family Medicine  Contact information:  [address]  [facility] [address]  [phone]             Joseph Ingram MD. Go to.    Specialty: Nephrology  Why: Keep dialysis appointments as arranged  Contact information:  [address]  [facility] [address] [phone]                   Contact information for after-discharge care     Home Medical Care     [facility] .    Service: Home Health Services  Contact information:  [address], [address]  [facility] [id number] [phone]                          follow-up with nephrology as arranged  Dialysis tomorrow  Patient Instructions:      Diet renal     Activity as tolerated       Significant Diagnostic Studies: Labs:   BMP:   Recent Labs   Lab 04/29/22 2040 04/30/22 0329 05/01/22  0320   * 102 134*    137 134*   K 4.0 3.7 4.3   CL 94* 95 93*   CO2 26 25 24   BUN 26* 28* 27*   CREATININE 5.1* 5.5* 5.2*   CALCIUM 8.6* 8.5* 8.4*   MG 1.8  --   --    , CMP   Recent Labs   Lab 04/29/22 2040 04/30/22 0329 05/01/22  0320    137 134*   K 4.0 3.7 4.3   CL 94* 95 93*   CO2 26 25 24   * 102 134*   BUN 26* 28* 27*   CREATININE 5.1* 5.5* 5.2*   CALCIUM 8.6* 8.5* 8.4*   PROT 8.1 7.8 7.7   ALBUMIN 3.4* 3.2* 3.2*   BILITOT 1.3* 1.3* 1.3*   ALKPHOS 103 97 86   AST 44* 36 32   ALT 28 27 24   ANIONGAP 16 17* 17*   ESTGFRAFRICA  8.9* 8.1* 8.7*   EGFRNONAA 7.7* 7.0* 7.5*    and CBC   Recent Labs   Lab 04/29/22  2040 04/30/22  0329 05/01/22  0320   WBC 4.21 4.37 3.50*   HGB 11.3* 11.0* 10.5*   HCT 36.4* 34.5* 34.6*   * 119* 111*       Pending Diagnostic Studies:     None         Medications:  Reconciled Home Medications:      Medication List      START taking these medications    * amiodarone 200 MG Tab  Commonly known as: PACERONE  Take 1 tablet (200 mg total) by mouth 3 (three) times daily. for 7 days     * amiodarone 200 MG Tab  Commonly known as: PACERONE  Take 1 tablet (200 mg total) by mouth 2 (two) times daily. for 14 days     predniSONE 20 MG tablet  Commonly known as: DELTASONE  Take 1 tablet (20 mg total) by mouth once daily. for 3 days  Start taking on: May 2, 2022         * This list has 2 medication(s) that are the same as other medications prescribed for you. Read the directions carefully, and ask your doctor or other care provider to review them with you.            CHANGE how you take these medications    Saccharomyces boulardii 250 mg capsule  Commonly known as: FLORASTOR  Take 1 capsule (250 mg total) by mouth with lunch.  What changed: Another medication with the same name was removed. Continue taking this medication, and follow the directions you see here.     warfarin 3 MG tablet  Commonly known as: COUMADIN  Take 1 tablet (3 mg total) by mouth Daily.  What changed:   · medication strength  · how much to take        CONTINUE taking these medications    * albuterol 90 mcg/actuation inhaler  Commonly known as: PROVENTIL/VENTOLIN HFA  Inhale 1 puff into the lungs every 4 (four) hours as needed for Wheezing. rescue     * albuterol 1.25 mg/3 mL Nebu  Commonly known as: ACCUNEB  Take 1.25 mg by nebulization every 6 (six) hours as needed. Rescue  HCS     aspirin 81 MG EC tablet  Commonly known as: ECOTRIN  Take 81 mg by mouth once daily.     calcium acetate(phosphat bind) 667 mg capsule  Commonly known as: PHOSLO  Take  667 mg by mouth 3 (three) times daily with meals.     cyanocobalamin 1000 MCG tablet  Commonly known as: VITAMIN B-12  Take 1 tablet (1,000 mcg total) by mouth once daily.     isosorbide mononitrate 30 MG 24 hr tablet  Commonly known as: IMDUR  Take 1 tablet (30 mg total) by mouth once daily.     LORazepam 0.5 MG tablet  Commonly known as: ATIVAN  Take 1 tablet (0.5 mg total) by mouth every 6 (six) hours as needed for Anxiety.     oxyCODONE-acetaminophen 5-325 mg per tablet  Commonly known as: PERCOCET  Take 1 tablet by mouth every 4 (four) hours as needed for Pain.     pantoprazole 40 MG tablet  Commonly known as: PROTONIX  Take 1 tablet by mouth once daily     VITAMIN B COMPLEX ORAL  1 tablet DAILY (route: oral)         * This list has 2 medication(s) that are the same as other medications prescribed for you. Read the directions carefully, and ask your doctor or other care provider to review them with you.            STOP taking these medications    diltiaZEM 30 MG tablet  Commonly known as: CARDIZEM     heparin (porcine) 10,000 unit/mL injection     hydrALAZINE 50 MG tablet  Commonly known as: APRESOLINE     losartan 25 MG tablet  Commonly known as: COZAAR     metoprolol succinate 25 MG 24 hr tablet  Commonly known as: TOPROL-XL     montelukast 10 mg tablet  Commonly known as: SINGULAIR     ondansetron 4 MG tablet  Commonly known as: ZOFRAN     sodium chloride 0.9% SolP 100 mL with iron sucrose 100 mg iron/5 mL Soln 100 mg            Indwelling Lines/Drains at time of discharge:   Lines/Drains/Airways     Drain  Duration                Hemodialysis AV Fistula 08/08/19 0214 Right upper arm 997 days                Time spent on the discharge of patient:22 minutes         Basil Velasquez DO  Department of Hospital Medicine  Formerly [facility], later [facility]

## 2022-05-02 NOTE — PROGRESS NOTES
INR not at goal. Medications, chart, and patient findings reviewed. Admitted over the weekend for hypotension & SOB. Found to have hypervolemia and COPD exacerbation. Also in Afib. Discharged on amiodarone and 3 day course of prednisone. Warfarin dose was increased at discharge which is not appropriate given that her INR during admission was above her goal range of 1.3-1.5 (set by Dr. Asif) and she is now starting amiodarone. See calendar for adjustments to dose and follow up plan.

## 2022-05-02 NOTE — PROGRESS NOTES
Verbal result taken from Linda/Zane LITTLEJOHN/Allie_________. PT/INR _3.0______ Date drawn_05/02/2022_______ Hardcopy to be faxed.

## 2022-05-02 NOTE — TELEPHONE ENCOUNTER
----- Message from Delano Allred sent at 5/2/2022 10:43 AM CDT -----  Contact: Linda with [facility]  Type: Needs Medical Advice  Who Called: Linda with [facility]  Symptoms (please be specific):   How long has patient had these symptoms:    Pharmacy name and phone #:    Best Call Back Number: [phone]  Additional Information: Linda with [facility] requesting a call back for scheduling a Hospital Follow Up appt for the patient.

## 2022-05-06 NOTE — PROGRESS NOTES
INR not at goal. Medications, chart, and patient findings reviewed. Held on own 5/5. See calendar for adjustments to dose and follow up plan.

## 2022-05-07 NOTE — ED PROVIDER NOTES
Encounter Date: 5/7/2022       History   No chief complaint on file.    Patient presents patient presents receiving CPR.  Patient was with family this morning complained that she did not feel well and then passed out.  Upon EMS arrival patient was pulseless and apneic.  Patient received short round of CPR and gained return of spontaneous circulation but then very quickly within seconds again lost her pulse.  Patient then take again received ACLS protocol CPR.  She received a Mark LT tube in the field and received over 40 minutes of CPR in the field.  Patient had blood glucose of 29 and received 2 amps of D50.  Upon arrival to the ER patient remains pulseless and apneic receiving CPR by the Jose Alejandro device with a Mark LT in place and frothy bloody secretions from the LT tube and around the mouth.        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    both legs    CHF (congestive heart failure)     Diverticulitis 11/2021    Encounter for blood transfusion 03/2016    Gout     Hemodialysis access, AV graft     Hypertension     Mitral valve regurgitation     Osteoarthritis     Pancreatitis     Peripheral vascular disease     Pneumonia 09/09/2017    Renal failure      Past Surgical History:   Procedure Laterality Date    ACHILLES TENDON SURGERY Right     ANGIOGRAPHY OF ARTERIOVENOUS SHUNT Right 1/7/2020    Procedure: Fistulogram with Possible Intervention;  Surgeon: Joseph Loyola MD;  Location: [facility] CATH/EP LAB;  Service: Cardiology;  Laterality: Right;    ANGIOGRAPHY OF LOWER EXTREMITY Left 3/18/2020    Procedure: Angiogram Extremity Unilateral;  Surgeon: Ali Khoobehi, MD;  Location: [facility] CATH/EP LAB;  Service: Cardiology;  Laterality: Left;    APPENDECTOMY      CARDIAC CATHETERIZATION  07/03/2017    CHOLECYSTECTOMY      COLONOSCOPY Left 10/4/2020    Procedure: COLONOSCOPY;  Surgeon:  Jordan Kilpatrick MD;  Location: [facility] ENDO;  Service: Endoscopy;  Laterality: Left;    ESOPHAGOGASTRODUODENOSCOPY Left 8/17/2020    Procedure: EGD (ESOPHAGOGASTRODUODENOSCOPY);  Surgeon: Talat Trevizo MD;  Location: [facility] ENDO;  Service: Endoscopy;  Laterality: Left;    ESOPHAGOGASTRODUODENOSCOPY Left 10/2/2020    Procedure: EGD (ESOPHAGOGASTRODUODENOSCOPY);  Surgeon: Talat Trevizo MD;  Location: [facility] ENDO;  Service: Endoscopy;  Laterality: Left;    ESOPHAGOGASTRODUODENOSCOPY N/A 6/29/2021    Procedure: EGD (ESOPHAGOGASTRODUODENOSCOPY);  Surgeon: Sudheer Brown III, MD;  Location: [facility] ENDO;  Service: Endoscopy;  Laterality: N/A;    FISTULOGRAM Bilateral 4/11/2022    Procedure: FISTULOGRAM;  Surgeon: Joseph Loyola MD;  Location: [facility] CATH/EP LAB;  Service: General;  Laterality: Bilateral;    heal surgery Right     HYSTERECTOMY      INSERTION OF STENT INTO PERIPHERAL VESSEL N/A 1/7/2020    Procedure: INSERTION, STENT, VESSEL, PERIPHERAL;  Surgeon: Joseph Loyola MD;  Location: [facility] CATH/EP LAB;  Service: Cardiology;  Laterality: N/A;    MITRAL VALVE REPLACEMENT      PARATHYROIDECTOMY      PARATHYROIDECTOMY  07/13/2017    PERCUTANEOUS TRANSLUMINAL ANGIOPLASTY OF ARTERIOVENOUS FISTULA N/A 1/7/2020    Procedure: PTA, AV FISTULA;  Surgeon: Joseph Loyola MD;  Location: [facility] CATH/EP LAB;  Service: Cardiology;  Laterality: N/A;    PERITONEAL CATHETER INSERTION      RENAL BIOPSY      SMALL BOWEL ENTEROSCOPY N/A 12/2/2020    Procedure: ENTEROSCOPY;  Surgeon: Sudheer Brown III, MD;  Location: [facility] ENDO;  Service: Endoscopy;  Laterality: N/A;    vocal cord nodule      WOUND DEBRIDEMENT Left 7/13/2020    Procedure: DEBRIDEMENT, WOUND;  Surgeon: Carlos Elam III, MD;  Location: [facility] OR;  Service: General;  Laterality: Left;     Family History   Problem Relation Age of Onset    Heart disease Sister     Early death Sister         heart as baby    Heart disease Maternal Grandfather      Diabetes Mother     Hypertension Mother     Heart failure Mother     Heart disease Mother     Arthritis Mother     Diabetes Father     Early death Sister         infant     Social History     Tobacco Use    Smoking status: Current Some Day Smoker     Packs/day: 0.50     Years: 45.00     Pack years: 22.50     Types: Cigarettes    Smokeless tobacco: Never Used   Substance Use Topics    Alcohol use: No    Drug use: No     Review of Systems   Unable to perform ROS: Patient unresponsive       Physical Exam     Initial Vitals   BP Pulse Resp Temp SpO2   -- -- -- -- --      MAP       --         Physical Exam    Constitutional: Pulses: No pulses palpable. Airway: Mark Tube present. Level of Consciousness: Unresponsive.   HENT:   Head: No head trauma present.   Cardiovascular: CPR in Progress. There is Jose Alejandro device in place.  Pulmonary/Chest: Respirations: None. Ventilations: Bag-Valve-Mask.     Neurological: Unresponsive to stimuli.   Skin: Dialysis sites present.         ED Course   Intubation    Date/Time: 5/7/2022 10:56 AM  Location procedure was performed: [facility] EMERGENCY DEPARTMENT  Performed by: Chano Tee MD  Authorized by: Chano Tee MD   Indications: respiratory failure  Intubation method: video-assisted  Patient status: unconscious  Laryngoscope size: Glide 4  Tube size: 8.0 mm  Tube type: cuffed  Number of attempts: 1  Cricoid pressure: no  Cords visualized: yes  Post-procedure assessment: ETCO2 monitor and chest rise  Cuff inflated: yes  Tube secured with: adhesive tape  Complications: No        Labs Reviewed   POCT GLUCOSE - Abnormal; Notable for the following components:       Result Value    POC Glucose 265 (*)     All other components within normal limits          Imaging Results          X-Ray Chest AP Portable (No Result on File)                  Medications - No data to display  Medical Decision Making:   ED Management:  In the emergency department patient received ACLS protocol  CPR.  Two IVs were started and airway was changed from a Mark LT to an endotracheal tube 8 0 using the glide scope.  Patient received calcium and bicarb as well as multiple doses of epinephrine.  Despite exhausting our efforts patient receiving CPR over 1 hour excluding time and field she never had return of spontaneous circulation.  Time of death was called at 10:36 a.m..    I then was able to discuss personally in the family consult room with the sisters cousin and family the patient had .  I gave my condolences.    Attending Critical Care:   Critical Care Times:   Direct Patient Care (initial evaluation, reassessments, and time considering the case)................................................................10 minutes.   Additional History from reviewing old medical records or taking additional history from the family, EMS, PCP, etc.......................10 minutes.   Ordering, Reviewing, and Interpreting Diagnostic Studies...............................................................................................................10 minutes.   Documentation..................................................................................................................................................................................5 minutes.   ==============================================================  · Total Critical Care Time - exclusive of procedural time: 35 minutes.  ==============================================================  Critical care was necessary to treat or prevent imminent or life-threatening deterioration of the following conditions:  Cardiac arrest.   Critical care was time spent personally by me on the following activities: obtaining history from patient or relative, examination of patient, review of x-rays / CT sent with the patient, ordering lab, x-rays, and/or EKG, development of treatment plan with patient or relative, ordering and performing treatments and  interventions, interpretation of cardiac measurements and re-evaluation of patient's condition.   Critical Care Condition: potentially life-threatening                         Clinical Impression:   Final diagnoses:  [I46.9] Cardiac arrest (Primary)          ED Disposition Condition                   Chano Tee MD  22 1056       Chano Tee MD  22 1052

## 2022-05-07 NOTE — ED PROVIDER NOTES
Encounter Date: 5/6/2022       History     Chief Complaint   Patient presents with    Leg Pain     Bilateral lower extremity edema onset yesterday     Patient presents complaining of lower leg pain.  Patient has a history of end-stage renal disease.  Patient did have dialysis this morning.  Patient has a history of multiple hospital admissions.  Patient was recently in the hospital for low blood pressure.  Patient complains of bilateral lower leg pain.        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    both legs    CHF (congestive heart failure)     Diverticulitis 11/2021    Encounter for blood transfusion 03/2016    Gout     Hemodialysis access, AV graft     Hypertension     Mitral valve regurgitation     Osteoarthritis     Pancreatitis     Peripheral vascular disease     Pneumonia 09/09/2017    Renal failure      Past Surgical History:   Procedure Laterality Date    ACHILLES TENDON SURGERY Right     ANGIOGRAPHY OF ARTERIOVENOUS SHUNT Right 1/7/2020    Procedure: Fistulogram with Possible Intervention;  Surgeon: Joseph Loyola MD;  Location: [facility] CATH/EP LAB;  Service: Cardiology;  Laterality: Right;    ANGIOGRAPHY OF LOWER EXTREMITY Left 3/18/2020    Procedure: Angiogram Extremity Unilateral;  Surgeon: Ali Khoobehi, MD;  Location: [facility] CATH/EP LAB;  Service: Cardiology;  Laterality: Left;    APPENDECTOMY      CARDIAC CATHETERIZATION  07/03/2017    CHOLECYSTECTOMY      COLONOSCOPY Left 10/4/2020    Procedure: COLONOSCOPY;  Surgeon: Jordan Kilpatrick MD;  Location: [facility];  Service: Endoscopy;  Laterality: Left;    ESOPHAGOGASTRODUODENOSCOPY Left 8/17/2020    Procedure: EGD (ESOPHAGOGASTRODUODENOSCOPY);  Surgeon: Talat Trevizo MD;  Location: [facility];  Service: Endoscopy;  Laterality: Left;    ESOPHAGOGASTRODUODENOSCOPY Left 10/2/2020    Procedure: EGD  (ESOPHAGOGASTRODUODENOSCOPY);  Surgeon: Talat Trevizo MD;  Location: [facility] ENDO;  Service: Endoscopy;  Laterality: Left;    ESOPHAGOGASTRODUODENOSCOPY N/A 6/29/2021    Procedure: EGD (ESOPHAGOGASTRODUODENOSCOPY);  Surgeon: Sudheer Brown III, MD;  Location: [facility] ENDO;  Service: Endoscopy;  Laterality: N/A;    FISTULOGRAM Bilateral 4/11/2022    Procedure: FISTULOGRAM;  Surgeon: Joseph Loyola MD;  Location: [facility] CATH/EP LAB;  Service: General;  Laterality: Bilateral;    heal surgery Right     HYSTERECTOMY      INSERTION OF STENT INTO PERIPHERAL VESSEL N/A 1/7/2020    Procedure: INSERTION, STENT, VESSEL, PERIPHERAL;  Surgeon: Joseph Loyola MD;  Location: [facility] CATH/EP LAB;  Service: Cardiology;  Laterality: N/A;    MITRAL VALVE REPLACEMENT      PARATHYROIDECTOMY      PARATHYROIDECTOMY  07/13/2017    PERCUTANEOUS TRANSLUMINAL ANGIOPLASTY OF ARTERIOVENOUS FISTULA N/A 1/7/2020    Procedure: PTA, AV FISTULA;  Surgeon: Joseph Loyola MD;  Location: [facility] CATH/EP LAB;  Service: Cardiology;  Laterality: N/A;    PERITONEAL CATHETER INSERTION      RENAL BIOPSY      SMALL BOWEL ENTEROSCOPY N/A 12/2/2020    Procedure: ENTEROSCOPY;  Surgeon: Sudheer Brown III, MD;  Location: [facility] ENDO;  Service: Endoscopy;  Laterality: N/A;    vocal cord nodule      WOUND DEBRIDEMENT Left 7/13/2020    Procedure: DEBRIDEMENT, WOUND;  Surgeon: Carlos Elam III, MD;  Location: [facility] OR;  Service: General;  Laterality: Left;     Family History   Problem Relation Age of Onset    Heart disease Sister     Early death Sister         heart as baby    Heart disease Maternal Grandfather     Diabetes Mother     Hypertension Mother     Heart failure Mother     Heart disease Mother     Arthritis Mother     Diabetes Father     Early death Sister         infant     Social History     Tobacco Use    Smoking status: Current Some Day Smoker     Packs/day: 0.50     Years: 45.00     Pack years: 22.50     Types:  Cigarettes    Smokeless tobacco: Never Used   Substance Use Topics    Alcohol use: No    Drug use: No     Review of Systems   All other systems reviewed and are negative.      Physical Exam     Initial Vitals   BP Pulse Resp Temp SpO2   05/06/22 1437 05/06/22 1437 05/06/22 1437 05/06/22 1543 05/06/22 1717   (!) 93/54 (!) 56 18 97.6 °F (36.4 °C) (!) 61 %      MAP       --                Physical Exam    Nursing note and vitals reviewed.  Constitutional:   Pleasant, polite, elderly, frail   HENT:   Head: Normocephalic and atraumatic.   Eyes: EOM are normal.   Neck: Neck supple.   Normal range of motion.  Cardiovascular: Intact distal pulses.   Pulmonary/Chest: Breath sounds normal. No respiratory distress.   Musculoskeletal:      Cervical back: Normal range of motion and neck supple.      Comments: 1+ nonpitting edema bilateral.  No erythema warmth or cellulitis.     Neurological: She is alert and oriented to person, place, and time.   Skin: Skin is warm and dry. Capillary refill takes less than 2 seconds.   Psychiatric: She has a normal mood and affect. Her behavior is normal. Judgment and thought content normal.         ED Course   Procedures  Labs Reviewed   CBC W/ AUTO DIFFERENTIAL - Abnormal; Notable for the following components:       Result Value    MCHC 31.5 (*)     RDW 18.7 (*)     Platelets 78 (*)     Lymph # 0.4 (*)     nRBC 5 (*)     Gran % 87.3 (*)     Lymph % 6.0 (*)     All other components within normal limits   COMPREHENSIVE METABOLIC PANEL - Abnormal; Notable for the following components:    Chloride 93 (*)     BUN 39 (*)     Creatinine 4.4 (*)     Total Protein 8.6 (*)     Total Bilirubin 1.5 (*)     Anion Gap 20 (*)     eGFR if  10.6 (*)     eGFR if non  9.2 (*)     All other components within normal limits   ISTAT PROCEDURE - Abnormal; Notable for the following components:    POC PCO2 34.0 (*)     POC HCO3 21.9 (*)     All other components within normal limits           Imaging Results          US Lower Extremity Veins Bilateral (Final result)  Result time 05/06/22 17:22:30    Final result by Aidan Mullen MD (05/06/22 17:22:30)                 Narrative:    Venous Doppler Ultrasound Bilateral Lower Extremities    CLINICAL DATA:    FINDINGS: Color and duplex Doppler sonographic assessment with spectral analysis of the bilateral lower extremities demonstrates no evidence of deep vein thrombosis. Normal color Doppler flow, augmentation, and compressibility are demonstrated at all levels. Calf veins are suboptimally visualized.    Mildly prominent left inguinal lymph node measures 2.4 x 1.3 x 1.7 cm. While nonspecific, this is probably benign/reactive, with similar findings demonstrated on previous CT from November 2021.    IMPRESSION:  1. No evidence of DVT in the bilateral lower extremities.  2. Mildly prominent left inguinal lymph node, likely benign/reactive.    Electronically signed by:  Aidan Mullen MD  5/6/2022 5:22 PM CDT Workstation: [id number]                               Medications   0.9%  NaCl infusion (0 mLs Intravenous Stopped 5/6/22 7548)   HYDROcodone-acetaminophen 5-325 mg per tablet 1 tablet (1 tablet Oral Given 5/6/22 1618)     Medical Decision Making:   Initial Assessment:   No apparent distress  Differential Diagnosis:   Dependent edema, DVT  Clinical Tests:   Lab Tests: Reviewed and Ordered  Radiological Study: Ordered and Reviewed  Medical Tests: Reviewed and Ordered  ED Management:  In the emergency department ultrasound reveals no evidence of DVT.  Blood work reviewed and is at patient's baseline.  Potassium is normal.  Pulse oximetry unable to be obtained secondary to patient has acrylic nails.  ABG performed for this which shows patient's oxygenation at 96.  Patient is stable for discharge.  Detailed return precautions discussed.             ED Course as of 05/07/22 0939   Fri May 06, 2022   0831 Unfortunately we were unable to document  a pulse oximetry on this patient despite multiple temp so I ordered an ABG to try to note patient's oxygen status but she refuses this test.  We will make 1 more temp with Respiratory therapy the patient wants to go home. [AP]      ED Course User Index  [AP] Chano Tee MD             Clinical Impression:   Final diagnoses:  [R52] Pain  [M79.604, M79.605] Pain in both lower extremities (Primary)          ED Disposition Condition    Discharge Stable        ED Prescriptions     Medication Sig Dispense Start Date End Date Auth. Provider    predniSONE (DELTASONE) 20 MG tablet Take 1 tablet (20 mg total) by mouth once daily. for 5 days 5 tablet 5/6/2022 5/11/2022 Daren Ramirez MD        Follow-up Information     Follow up With Specialties Details Why Contact Info    [facility], [facility] In 1 week  [address]  [place] [address]  [phone]             Chano Tee MD  05/07/22 0975

## 2022-05-07 NOTE — CODE/ RAPID DOCUMENTATION
[facility] called to report death, spoke to Edna Lakhani and patient was ruled out for tissue and eye donation due to medical history and age.  Referral # [id number]

## 2022-05-07 NOTE — CODE/ RAPID DOCUMENTATION
[facility] called and they asked to move body to the morgue and they will return the call asap today.

## 2022-06-23 ENCOUNTER — EXTERNAL HOME HEALTH (OUTPATIENT)
Dept: HOME HEALTH SERVICES | Facility: HOSPITAL | Age: 77
End: 2022-06-23
Payer: MEDICARE

## 2022-07-11 ENCOUNTER — DOCUMENT SCAN (OUTPATIENT)
Dept: HOME HEALTH SERVICES | Facility: HOSPITAL | Age: 77
End: 2022-07-11
Payer: MEDICARE

## 2022-11-24 NOTE — PLAN OF CARE
05/11/20 0855   Patient Assessment/Suction   Level of Consciousness (AVPU) alert   Respiratory Effort Labored   Expansion/Accessory Muscles/Retractions no use of accessory muscles;no retractions   All Lung Fields Breath Sounds coarse   Rhythm/Pattern, Respiratory shortness of breath   PRE-TX-O2   O2 Device (Oxygen Therapy) nasal cannula   $ Is the patient on Low Flow Oxygen? Yes   Flow (L/min) 3   SpO2 (!) 93 %   Pulse Oximetry Type Continuous   $ Pulse Oximetry - Multiple Charge Pulse Oximetry - Multiple   Pulse 105   Resp (!) 35   Preset CPAP/BiPAP Settings   Mode Of Delivery BiPAP   $ CPAP/BiPAP Daily Charge BiPAP/CPAP Daily   $ Is patient using? Yes   Equipment Type V60   Ipap 12   EPAP (cm H2O) 8   Pressure Support (cm H2O) 4   Set Rate (Breaths/Min) 18   ITime (sec) 0.8   Rise Time (sec) 3   Patient CPAP/BiPAP Settings   RR Total (Breaths/Min) 34   Tidal Volume (mL) 387   VE Minute Ventilation (L/min) 14.3 L/min   Peak Inspiratory Pressure (cm H2O) 12   TiTOT (%) 31   Total Leak (L/Min) 27   Patient Trigger - ST Mode Only (%) 37   CPAP/BiPAP Backup Settings   Backup Rate 18 breaths per minute (bpm)   Respiratory Evaluation   $ Care Plan Tech Time 15 min   Evaluation For   (care plan)      Pt here with fever and sore throat for two days. Temp in triage 101.2 tympanic. Pt is soon due for medications. Mom also said pt's neck hurts.      Triage Assessment     Row Name 11/24/22 6678       Triage Assessment (Pediatric)    Airway WDL WDL       Respiratory WDL    Respiratory WDL X;cough       Cardiac WDL    Cardiac WDL X;rhythm  tachycardic       Cognitive/Neuro/Behavioral WDL    Cognitive/Neuro/Behavioral WDL WDL

## 2023-01-01 NOTE — PROGRESS NOTES
[facility]  Cardiology  Progress Note    Patient Name: Griselda Neely  MRN: [id number]  Admission Date: 2/2/2020  Hospital Length of Stay: 0 days  Code Status: Full Code   Attending Physician: Cierra Thomas MD   Primary Care Physician: Kalie Neely MD  Expected Discharge Date: 2/5/2020  Principal Problem:Atrial fibrillation with RVR    Subjective:       Interval History  Patient seen and examined sitting up in the chair. She states she is feeling much better and is ready to go home. She received dialysis last night and 2500 mL were removed. Patient remains in atrial fibrillation with controlled rates. INR down to 2.2.   ROS   Denies any chest pain.  Denies any abdominal pain.   Objective:     Vital Signs (Most Recent):  Temp: 97.6 °F (36.4 °C) (02/05/20 0811)  Pulse: 60 (02/05/20 0811)  Resp: 20 (02/05/20 0811)  BP: (!) 141/63 (02/05/20 0811)  SpO2: 100 % (02/05/20 0811) Vital Signs (24h Range):  Temp:  [95.9 °F (35.5 °C)-98.3 °F (36.8 °C)] 97.6 °F (36.4 °C)  Pulse:  [60-92] 60  Resp:  [18-22] 20  SpO2:  [97 %-100 %] 100 %  BP: (107-186)/(56-82) 141/63     Weight: 60.8 kg (134 lb 0.6 oz)  Body mass index is 22.31 kg/m².    SpO2: 100 %  O2 Device (Oxygen Therapy): nasal cannula      Intake/Output Summary (Last 24 hours) at 2/5/2020 0850  Last data filed at 2/5/2020 0410  Gross per 24 hour   Intake 1100 ml   Output 2501 ml   Net -1401 ml       Lines/Drains/Airways     Drain                 Hemodialysis AV Fistula 08/08/19 0214 Right upper arm 181 days          Peripheral Intravenous Line                 Peripheral IV - Single Lumen 02/02/20 1549 20 G Left Forearm 2 days                Scheduled Meds:   sodium chloride 0.9%   Intravenous Once    sodium chloride 0.9%   Intravenous Once    albuterol-ipratropium  3 mL Nebulization Q6H WAKE    chlorhexidine  15 mL Mouth/Throat BID    diltiaZEM  180 mg Oral Daily    fluticasone furoate-vilanterol  1 puff Inhalation Daily    magnesium oxide   400 mg Oral Daily    metoprolol succinate  50 mg Oral Daily    mupirocin   Nasal BID    warfarin  2.5 mg Oral Daily     Continuous Infusions:  PRN Meds:.sodium chloride 0.9%, sodium chloride 0.9%, HYDROcodone-acetaminophen, ondansetron, sodium chloride 0.9%     Physical Exam  HEENT: Normocephalic, atraumatic, PERRL, Conjunctiva pink, no scleral icterus.   CVS: S1S2+, Irregular, SM+, no rubs or gallops, JVP: Normal.  LUNGS: Clear   ABDOMEN: Soft, NT, BS+  EXTREMITIES: No cyanosis, clubbing, trace edema in the lower extremities. +Hematoma in the left antecubital fossa.   NEURO: AAO X 3.       Significant Labs:   BMP:   Recent Labs   Lab 02/04/20 0605 02/05/20 0419   * 83   * 138   K 5.4* 4.8   CL 97 101   CO2 22* 23   BUN 42* 38*   CREATININE 5.0* 4.2*   CALCIUM 8.0* 8.1*   , CMP   Recent Labs   Lab 02/04/20 0605 02/05/20 0419   * 138   K 5.4* 4.8   CL 97 101   CO2 22* 23   * 83   BUN 42* 38*   CREATININE 5.0* 4.2*   CALCIUM 8.0* 8.1*   ANIONGAP 16 14   ESTGFRAFRICA 9.2* 11.4*   EGFRNONAA 8.0* 9.9*   , CBC   Recent Labs   Lab 02/04/20 0605 02/05/20 0419   WBC 12.54 8.86   HGB 9.7* 9.1*   HCT 32.0* 30.6*    247   , INR   Recent Labs   Lab 02/04/20 0605 02/05/20 0419   INR 2.9  2.9 2.2   , Lipid Panel No results for input(s): CHOL, HDL, LDLCALC, TRIG, CHOLHDL in the last 48 hours. and Troponin   No results for input(s): TROPONINI in the last 48 hours.    Significant Imaging: Reviewed  Assessment and Plan:     IMPRESSION:  Chest pain. Troponin mildly elevated (chronic). ECG Without dynamic changes. Not consistent with ACS. Chest pain free.  Shortness of breath. Pulmonary edema noted on CXR- seems improved after HD last night.   Large left atrial mass. Likely thrombus. TRUPTI with large thrombus in the roof of the left atrium as well as in the YOUNG. CT Abdomen with left atrial filling defect. On Coumadin. INR supratherapeutic at 3.8. Holding Coumadin tonight.   Congestive heart  failure. Chronic. LVEF ~25% on last echo. Chronically elevated BNP.   COPD.  Calciphlyaxis.   Atrial fibrillation. Valvular. Persistent. RVR has resolved after short course of IV Cardizem. Rate controlled.   Hypertension. Better controlled at present. Elevates with agitation. Periods of hypotension.    s/p MVR via right thoracotomy approach on 3/18/19 with Dr. Lora. Functioning appropriately per last echo.   CAD. Nonobstructive on [facility] done on 3/13/2019.   End-stage renal disease on hemodialysis MWF. Anuric. Followed by Dr. Ingram.   Chronic tobacco abuse.  Left antecubital fossa hematoma from previous IV.     PLAN:  1. From our standpoint, patient is stable for discharge home today.   2. She can follow up in our clinic in 1 or 2 weeks. We will call her with date and time.   3. Would alternate Coumadin 2.5 mg with 5mg every other day- recheck on Friday.       Ramila Bloom NP  Cardiology  [facility]    I have personally seen and examined the patient. I reviewed the notes, assessments, and/or procedures performed by Ms Ramila Bloom, I concur with her documentation of Griselda Neely.       13.58

## 2023-02-22 NOTE — CONSULTS
GASTROENTEROLOGY INPATIENT CONSULT NOTE  Patient Name: Griselda Neely  Patient MRN: [id number]  Patient : 1945    Admit Date: 2020  Service date: 2020    Reason for Consult: acute / chronic anemia    PCP: Kalie Neely MD    Chief Complaint   Patient presents with    Shortness of Breath       HPI: Patient is a 75 y.o. female with PMHx  GERD, hypotension (midodrine), ESRD (MWF; Right AVF), CHF on home O2, cholecystectomy, pancreatitis, appendectomy, LINDSAY, diverticulosis, colon AVM, Left atrial thrombus (coumadin) presents for evaluation of shortness of breath and weakness. Acute / chronic, intermittent, progressive over past days. Poor historian but reports occasional dark stools but no rectal bleeding.     CHART REVIEW:  Labs  - Hg - 6.3 (8.5 in mid ); BNP 4,500  Colon 10/'20 - Polyps NOT REMOVED: asceding AVM s/p ablation and sig diverticulosis;  EGD 10/'20 - min gastritis  CT ABd  - pleural effusions; cardiomegaly; Left atrial thrombus; vascular disease; tics;  RUQ u/s 10/'19 - prominence in head of pancreas; normal bile ducts; s/p missael    Past Medical History:  Past Medical History:   Diagnosis Date    Anemia     Atrial fibrillation     Calciphylaxis 2017    both legs    CHF (congestive heart failure)     Encounter for blood transfusion 2016    Gout     Hemodialysis access, AV graft     Hypertension     Mitral valve regurgitation     Osteoarthritis     Pancreatitis     Peripheral vascular disease     Pneumonia 2017    Renal failure         Past Surgical History:  Past Surgical History:   Procedure Laterality Date    ACHILLES TENDON SURGERY Right     ANGIOGRAPHY OF ARTERIOVENOUS SHUNT Right 2020    Procedure: Fistulogram with Possible Intervention;  Surgeon: Joseph Loyola MD;  Location: [facility] CATH/EP LAB;  Service: Cardiology;  Laterality: Right;    ANGIOGRAPHY OF LOWER EXTREMITY Left 3/18/2020    Procedure: Angiogram Extremity Unilateral;   Surgeon: Ali Khoobehi, MD;  Location: [facility] CATH/EP LAB;  Service: Cardiology;  Laterality: Left;    APPENDECTOMY      CARDIAC CATHETERIZATION  07/03/2017    CHOLECYSTECTOMY      COLONOSCOPY Left 10/4/2020    Procedure: COLONOSCOPY;  Surgeon: Jordan Kilpatrick MD;  Location: [facility] ENDO;  Service: Endoscopy;  Laterality: Left;    ESOPHAGOGASTRODUODENOSCOPY Left 8/17/2020    Procedure: EGD (ESOPHAGOGASTRODUODENOSCOPY);  Surgeon: Talat Trevizo MD;  Location: [facility] ENDO;  Service: Endoscopy;  Laterality: Left;    ESOPHAGOGASTRODUODENOSCOPY Left 10/2/2020    Procedure: EGD (ESOPHAGOGASTRODUODENOSCOPY);  Surgeon: Talat Trevizo MD;  Location: [facility] ENDO;  Service: Endoscopy;  Laterality: Left;    heal surgery Right     HYSTERECTOMY      INSERTION OF STENT INTO PERIPHERAL VESSEL N/A 1/7/2020    Procedure: INSERTION, STENT, VESSEL, PERIPHERAL;  Surgeon: Joseph Loyola MD;  Location: [facility] CATH/EP LAB;  Service: Cardiology;  Laterality: N/A;    MITRAL VALVE REPLACEMENT      PARATHYROIDECTOMY      PARATHYROIDECTOMY  07/13/2017    PERCUTANEOUS TRANSLUMINAL ANGIOPLASTY OF ARTERIOVENOUS FISTULA N/A 1/7/2020    Procedure: PTA, AV FISTULA;  Surgeon: Joseph Loyola MD;  Location: [facility] CATH/EP LAB;  Service: Cardiology;  Laterality: N/A;    PERITONEAL CATHETER INSERTION      RENAL BIOPSY      vocal cord nodule      WOUND DEBRIDEMENT Left 7/13/2020    Procedure: DEBRIDEMENT, WOUND;  Surgeon: Carlos Elam III, MD;  Location: [facility] OR;  Service: General;  Laterality: Left;        Home Medications:  (Not in a hospital admission)      Inpatient Medications:   calcium acetate(phosphat bind)  667 mg Oral Daily    diltiaZEM  240 mg Oral Daily    isosorbide mononitrate  30 mg Oral Daily    losartan  25 mg Oral Daily    magnesium oxide  400 mg Oral Daily    metoprolol succinate  25 mg Oral Nightly    pantoprazole  40 mg Intravenous Daily    traMADoL  50 mg Oral Three Times Weekly     sodium chloride,  "HYDROmorphone, levalbuterol, melatonin, ondansetron, oxyCODONE-acetaminophen, sodium chloride 0.9%    Review of patient's allergies indicates:  No Known Allergies    Social History:   Social History     Occupational History    Not on file   Tobacco Use    Smoking status: Current Some Day Smoker     Packs/day: 0.50     Years: 45.00     Pack years: 22.50     Types: Cigarettes    Smokeless tobacco: Never Used   Substance and Sexual Activity    Alcohol use: No    Drug use: No    Sexual activity: Not Currently       Family History:   Family History   Problem Relation Age of Onset    Heart disease Sister     Early death Sister         heart as baby    Heart disease Maternal Grandfather     Diabetes Mother     Hypertension Mother     Heart failure Mother     Heart disease Mother     Arthritis Mother     Diabetes Father     Early death Sister         infant       Review of Systems:  A 10 point review of systems was performed and was normal, except as mentioned in the HPI, including constitutional, HEENT, heme, lymph, cardiovascular, respiratory, gastrointestinal, genitourinary, neurologic, endocrine, psychiatric and musculoskeletal.      OBJECTIVE:    Physical Exam:  24 Hour Vital Sign Ranges: Temp:  [98.4 °F (36.9 °C)] 98.4 °F (36.9 °C)  Pulse:  [72-94] 86  Resp:  [20-36] 24  SpO2:  [97 %-100 %] 100 %  BP: (138-228)/(74-99) 196/81  Most recent vitals: BP (!) 196/81   Pulse 86   Temp 98.4 °F (36.9 °C) (Oral)   Resp (!) 24   Ht 5' 5" (1.651 m)   Wt 61.2 kg (134 lb 14.7 oz)   LMP  (LMP Unknown)   SpO2 100%   Breastfeeding No   BMI 22.45 kg/m²    GEN: chronic ill appearing elderly BF; sleeping but arousable  HEENT: PERRL, sclera anicteric, oral mucosa pink and moist without lesion  NECK: trachea midline; Good ROM  CV: regular rate and rhythm, no murmurs or gallops  RESP: clear to auscultation bilaterally, no wheezes, rhonci or rales  ABD: soft, non-tender, non-distended, normal bowel sounds  EXT: no " swelling or edema, 1+ pulses distally; AVF  SKIN: no rashes or jaundice  PSYCH: flat affect    Labs:   Recent Labs     12/01/20 0420   WBC 7.51   MCV 93        Recent Labs     12/01/20 0420      K 5.1   CL 97   CO2 26   BUN 79*   GLU 83     No results for input(s): ALB in the last 72 hours.    Invalid input(s): ALKP, SGOT, SGPT, TBIL, DBIL, TPRO  Recent Labs     12/01/20 0420   INR 1.4         Radiology Review:  X-Ray Chest AP Portable   Final Result      Stable moderate cardiomegaly, with scattered nonspecific interstitial opacities, lower lung subsegmental atelectasis, and small bilateral pleural effusions.         Electronically signed by: Brian Eugene MD   Date:    12/01/2020   Time:    06:37            IMPRESSION / RECOMMENDATIONS:  75 y.o. female with PMHx  GERD, hypotension (midodrine), ESRD (MWF; Right AVF), CHF on home O2, cholecystectomy, pancreatitis, appendectomy, LINDSAY, diverticulosis, colon AVM, Left atrial thrombus (coumadin) presents for evaluation of shortness of breath and weakness w/ progressive anemia. Patient's anemia likely mutifactorial w/ CKD, chronic disease and suspected GI loss. Risks, benefits, alternative discussed in detail with patient  regarding anticipated procedure and possible complications.     -Push enteroscopy tomorrow  -If no source identified, will need outpatient VCE <1 week +/- repeat colon    Thank you for this consult.    Sudheer Brown III  12/1/2020  7:47 AM         no

## 2023-03-02 NOTE — PROGRESS NOTES
[facility]  Progress Note    Patient Name: Griselda Neely  MRN: [id number]  Patient Class: IP- Inpatient   Admission Date: 4/12/2020  Length of Stay: 2 days  Attending Physician: Chalino De Leon MD  Primary Care Provider: Kalie Neely MD        Subjective:     Principal Problem:Shortness of breath        HPI:  The patient is a 74-year-old female with history of CHF, end-stage renal disease - on HD, pneumonia, peripheral vascular disease, atrial fibrillation- on warfarin, hypertension, hyperlipidemia.   She presented to the ED with cough and shortness of breath. She lives with her sister, Aide. Per her other sister, Elvi Hi at [phone], she has COPD and on home O2 at 3 L NC. She continues to smoke. She appears to be confused and more shortness of breath than usual. 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 She has not complained of other pain or discomfort. Her sister does not know whether the patient has been running fever. Her other sister usually prepares her medication but not certain whether the patient has taken them today. Her sister states that the patient has chronic cough which does not appear to be worsen.         In the ED:  Afebrile  Tachycardic  Tachypneic  BP OK  WBC 5.43  H&H 11.4/38.7  K 5.2  Ferritin 877  CRP 4.25  BNP > 4500  Lactic acid 2.0  Procalcitonin 0.42  Chest radiograph: Increased opacification r hemithorax  CT head Negative  EKG, personally reviewed, atrial fibrillation, rate 107, no ST elevation    Overview/Hospital Course:  Patient was admitted as above.  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  Cardizem drip was started for all RVR and it was later stopped because of bradycardia  Patient is planned for hemodialysis today    4/14/2020  Pt was very upset this AM, appeared confused and spoke of pain but could not localize the pain. She had  multiple medications last PM including Haldol.S  Multiple tests ordered:  CT head without contrast-no acute finding  EKG-afib with RVR pt is on cardizem  ABG-patient refused  Assessment:  Afib with RVR off cardizem drip with 180 mg PO Q AM and heart rate   Metabolic encephalopathy-resolving  PLAN:reduce oxycodone to 5/325 mg Q 6 prn pain    4/15/20  Pt is awake and alert , sitting up in a chair. She states that she is unsure if we are taking good care of her and I asurred her that she was receiving great care. She would not answer any further questions.    Interval History: Pt is much more alert and awake-I do not want what you are doing for me.    Review of Systems   Reason unable to perform ROS: refused to answer questions.     Objective:     Vital Signs (Most Recent):  Temp: 97.3 °F (36.3 °C) (04/15/20 0800)  Pulse: 92 (04/15/20 0800)  Resp: 16 (04/15/20 0800)  BP: (!) 149/86 (04/15/20 0800)  SpO2: 98 % (04/15/20 0800) Vital Signs (24h Range):  Temp:  [97 °F (36.1 °C)-98.4 °F (36.9 °C)] 97.3 °F (36.3 °C)  Pulse:  [] 92  Resp:  [16-78] 16  SpO2:  [81 %-100 %] 98 %  BP: (112-149)/(58-86) 149/86     Weight: 59.3 kg (130 lb 11.2 oz)  Body mass index is 21.75 kg/m².  No intake or output data in the 24 hours ending 04/15/20 0834   Physical Exam   Constitutional: No distress.   HENT:   Head: Normocephalic and atraumatic.   Eyes: Pupils are equal, round, and reactive to light.   Neck: Normal range of motion. Neck supple.   Cardiovascular: Normal rate.   Pulmonary/Chest: Effort normal.   Abdominal: Soft. Bowel sounds are normal.   Musculoskeletal:   Pt would not cooperate with the exam   Neurological:   Pt would not cooperate with the exam   Skin: She is not diaphoretic.   Psychiatric:   Pt would not cooperate with the exam       Significant Labs:   BMP:   Recent Labs   Lab 04/15/20  0321   GLU 98   *   K 4.7   CL 91*   CO2 24   BUN 42*   CREATININE 5.9*   CALCIUM 7.8*   MG 2.2     CBC:   Recent Labs    Lab 04/14/20  0526 04/15/20  0321   WBC 9.40 6.63   HGB 11.0* 10.4*   HCT 35.9* 34.6*    204     CMP:   Recent Labs   Lab 04/14/20  0526 04/15/20  0321    131*   K 4.8 4.7   CL 95 91*   CO2 26 24   GLU 70 98   BUN 36* 42*   CREATININE 5.3* 5.9*   CALCIUM 8.1* 7.8*   ANIONGAP 15 16   EGFRNONAA 7.5* 6.6*       Significant Imaging: I have reviewed all pertinent imaging results/findings within the past 24 hours.      Assessment/Plan:      * Shortness of breath    Rapid COVID-19 screen Negative  Secondary to fluid overload/COPD    Plan   Procalcitonin not elevated, no antibiotic for now  For HD today   Control heart rate  Has pulmonary hypertension, PAS in the 50s per echo on 1/3/2020          Acute on chronic combined systolic and diastolic heart failure  BNP > 4500 secondary to fluid overload with ESRD with hyperkalemia     Plan   HD today           Atrial fibrillation with RVR    Rate now up to 160-170s and later cardizem drip discontinued    Plan   Continue warfarin   Home medications    4/14/2020  Much improved off the cardizem drip.    Encephalopathy  Possible secondary to fluid overload  Resolving    Plan   For HD today   On pain medication  Monitor    4/14/2020  Resolving and likely multifactorial in origin        Hyperparathyroidism due to renal insufficiency  Chronic medical condition  Continue home medication      Chronic respiratory failure  Continue O2 supplements  Respiratory treatments  Monitor      DNR (do not resuscitate)  DNR status in the past      Encephalopathy, metabolic  4/15/2020  Much improved this date      Anemia due to chronic kidney disease  Stable      ESRD (end stage renal disease) on HD M,W, F   for HD today as per nephrology    4/15/2020  Pt has per her sister had a progressive decline as dialysis has continued. Her sister is elderly and unable to pick her up after frequent falls. She would not cooperate with PT during a previous SNF placement. I have spoken to both of her  sisters who will speak to her about NHP, along with her son who lives out of state. I have consulted PT, OT and -for nursing home placement         HTN (hypertension)  Chronic medical condition  Continue home medication  Monitor        VTE Risk Mitigation (From admission, onward)         Ordered     warfarin (COUMADIN) tablet 2 mg  Daily      04/12/20 2044     Place sequential compression device  Until discontinued      04/12/20 2040     IP VTE HIGH RISK PATIENT  Once      04/12/20 2040                      Chalino De Leon MD  Department of Hospital Medicine   [facility]   21.6

## 2023-08-09 NOTE — TELEPHONE ENCOUNTER
Spoke with Aide (sister) she states patient has pain in her feet and legs.  Spoke with patient directly states she is having severe pain in her legs and both feet.  Patient states she can't walk and and feet are cool to touch.  Symptoms began on Wednesday.   Advised patient to go to ER for further evaluation.  Also advised patient to call [phone] if she is unable to be transported safely.  Patient states she does not want to go to ER.  Further advised ER and patient verbalized understanding.     Reason for Disposition   Entire foot is cool or blue in comparison to other foot   Entire foot is cool or blue in comparison to other side    Additional Information   Negative: Looks like a broken bone or dislocated joint (e.g., crooked or deformed)   Negative: Sounds like a life-threatening emergency to the triager   Negative: Chest pain   Negative: Difficulty breathing    Protocols used: FOOT PAIN-A-OH, LEG PAIN-A-OH       Cephalexin Counseling: I counseled the patient regarding use of cephalexin as an antibiotic for prophylactic and/or therapeutic purposes. Cephalexin (commonly prescribed under brand name Keflex) is a cephalosporin antibiotic which is active against numerous classes of bacteria, including most skin bacteria. Side effects may include nausea, diarrhea, gastrointestinal upset, rash, hives, yeast infections, and in rare cases, hepatitis, kidney disease, seizures, fever, confusion, neurologic symptoms, and others. Patients with severe allergies to penicillin medications are cautioned that there is about a 10% incidence of cross-reactivity with cephalosporins. When possible, patients with penicillin allergies should use alternatives to cephalosporins for antibiotic therapy.

## 2023-08-24 ENCOUNTER — EXTERNAL HOME HEALTH (OUTPATIENT)
Dept: HOME HEALTH SERVICES | Facility: HOSPITAL | Age: 78
End: 2023-08-24
Payer: MEDICARE

## 2023-12-27 NOTE — PLAN OF CARE
Plan of care reviewed with the pt. Cardiac, vital signs, and lab monitoring. Bed alarm on. Watch for falls. Breathing treatments and adjust oxygen as needed. Strict I&O. Daily weights.     There are no Wet Read(s) to document.

## 2024-03-01 NOTE — HOSPITAL COURSE
During her hospital stay patient was treated in ICU for acute hypoxic respiratory failure due to acute systolic CHF exacerbation.  She received dialysis 3 days in a row and improved remarkably and her oxygen requirement was back to her normal.  In the beginning of admission there was a concern of pneumonia however procalcitonin came back negative and we stop antibiotics.  This patient has numerous hospital admissions mainly for hypervolemia, not able to go to dialysis, noncompliance.  As per my discussion with Dr. Ingram he might run extra session of dialysis on Saturday outpatient.  Upon my re-evaluation later in the day she is eating her dinner and her sister is present at bedside. She appears back to her baseline and is very irritated being in the hospital.     Instructions provided to follow up with primary care physician as outpatient. Patient verbalized understanding and is aware to contact primary care physician or return to ED if new or worsening symptoms.    Physical exam on the day of discharge:  General: Patient resting comfortably in no acute distress.  Chronically ill-appearing  Lungs:  On nasal cannula,  CTA. Good air entry.  Cor: Regular rate and rhythm. No murmurs. No pedal edema.  Abd: Soft. Nontender. Non-distended.  Neuro: A&O x3. Moving all 4 extremities equally  Ext: No clubbing. No cyanosis.   Skin:  Chronic skin changes due to kidney disease   ADMIT

## 2024-09-04 NOTE — PLAN OF CARE
MARKELL order sent to [facility] health via [facility] fax     10/04/20 8474   Post-Acute Status   Post-Acute Authorization [facility]      n/a

## 2024-11-13 NOTE — ASSESSMENT & PLAN NOTE
Nephrology consulted for inpatient HD     Patient: WARREN MCCORMICK    MR# [id number]    Time of Service: 11/12/2024  6:46 PM     Chief Complaint   Patient presents with    Chills       History of Present Illness    Warren Mccormick is a 89 year old male who presents to the ED via EMS from home for evaluation of feeling chills and cold hands. Patient reports that he could not get warm.  Patient denies known fever.  He denies any nausea or vomiting.  He is complaining of some discomfort in his right leg as well as fluid retention and swelling.  He has chronic shortness of breath but no worse today than usual.  No nausea or vomiting.  Patient makes very little urine.  He has a history of end-stage renal disease and is normally dialyzed on Mondays, Wednesdays, Fridays and Saturdays.  He was last dialyzed yesterday and is due tomorrow.  He feels weak all over.      History provided by (including independent historians): patient, EMS    Review of Systems    All systems reviewed and are negative unless documented in the HPI.     Past Medical/Surgical History    Past Medical History:   Diagnosis Date    Anemia due to chronic kidney disease     Arthritis     Asthma (CMD)     Atrial fibrillation  (CMD)     Bradycardia     Cardiomyopathy (CMD)     Cataract     Claudication (CMD)     Diabetes mellitus  (CMD)     Edema of both lower extremities     Essential (primary) hypertension     High cholesterol     Iron deficiency anemia due to chronic blood loss     Nonrheumatic aortic (valve) insufficiency     Pacemaker     Paroxysmal VT  (CMD)     RAD (reactive airway disease) (CMD)     Sinusitis, chronic     SOB (shortness of breath)     Status post endovenous radiofrequency ablation (RFA) of saphenous vein     Status post placement of implantable loop recorder     Venous insufficiency        Past Surgical History:   Procedure Laterality Date    Eye surgery      bilateral cataract    Oral surgery procedure      Pacemaker         Medication list on file    Current Outpatient  Medications   Medication Instructions    acetaminophen (TYLENOL) 650 mg, Oral, EVERY 6 HOURS PRN    albuterol 108 (90 Base) MCG/ACT inhaler 2 puffs, Inhalation, EVERY 4 HOURS PRN    azelastine (ASTELIN) 0.1 % nasal spray 1 spray, Each Nare, 2 TIMES DAILY, Use in each nostril as directed    budesonide (PULMICORT FLEXHALER) 90 MCG/ACT inhaler 1 puff, Inhalation, 2 TIMES DAILY    bumetanide (BUMEX) 3 mg, Oral, DAILY    calcium carbonate (TUMS) 500 MG chewable tablet 1 tablet, Oral, DAILY    CARBOXYMethylcellulose PF (REFRESH PLUS) 0.5 % ophthalmic solution 1 drop, Both Eyes, 3 TIMES DAILY PRN    [facility] LUTEIN-ZEAXANTHIN PO 1 tablet, Oral, DAILY    Febuxostat 40 mg, Oral, DAILY    fexofenadine (ALLEGRA) 60 mg, Oral, DAILY    fluticasone (FLONASE) 50 MCG/ACT nasal spray 1 spray, Nasal, DAILY    guaiFENesin (FENESIN) 200 mg, Oral, 2 times daily    insulin glargine (LANTUS) 8 Units, Subcutaneous, DAILY    levothyroxine 50 mcg, Oral, DAILY    lidocaine 5 % rectal cream 1 Application, Topical, PRN, For hemorrhoids     MAGNESIUM OXIDE  mg, Oral, DAILY    midodrine (PROAMATINE) 5 mg, Oral, DAILY PRN, Takes it on days when he goes to Dialysis    midodrine (PROAMATINE) 5 mg, Oral, 3 TIMES DAILY PRN    MULTIPLE VITAMIN PO 1 tablet, Oral, DAILY    Multiple Vitamins-Minerals (PRESERVISION AREDS 2 PO) 1 capsule, Oral, DAILY    mupirocin (BACTROBAN) 2 % ointment 1 application., Each Nare, DAILY PRN    omeprazole (PRILOSEC) 20 mg, Oral, 2 times daily    polyethylene glycol (MIRALAX) 17 g, Oral, DAILY PRN, Stir and dissolve powder in any 4 to 8 ounces of beverage, then drink.    Simethicone (GAS-X PO) 1 capsule, Oral, PRN    vitamin B-12 (CYANOCOBALAMIN) 1,000 mcg, Oral, DAILY       Allergies    ALLERGIES:   Allergen Reactions    Clarithromycin ANAPHYLAXIS, HIVES and Other (See Comments)     Other reaction(s): Other (see comments), unknown - Unknown, Unknown Reaction      Allopurinol DIARRHEA    Altarussin Other (See Comments)     Amoxicillin-Pot Clavulanate HIVES     Other reaction(s): Unknown Reaction    Carvedilol RASH     Other reaction(s): Unknown Reaction    Cefuroxime HIVES     Other reaction(s): Unknown Reaction    Levofloxacin HIVES     Other reaction(s): Unknown Reaction    Pioglitazone SWELLING       Family/Social History/Social Determinants of Health    History reviewed. No pertinent family history.    Social History     Tobacco Use    Smoking status: Never     Passive exposure: Never    Smokeless tobacco: Never   Vaping Use    Vaping status: never used   Substance Use Topics    Alcohol use: Not Currently     Comment: rarely    Drug use: Never       Social Determinants of Health     Interpersonal Safety: Low Risk  (11/13/2024)    Interpersonal Safety     How often physically hurt: Never     How often insulted or talked down to: Never     How often threatened with harm: Never     How often scream or curse at: Never   Social Connections: Low Risk  (11/13/2024)    Social Connections     Social Connectivity: 3 to 5 times a week   Recent Concern: Social Connections - Medium Risk (9/7/2024)    Social Connections     Social Connectivity: 1 or 2 times a week   Alcohol Use: Not At Risk (11/13/2024)    Alcohol Use     Total Audit-C Score: 0   Tobacco Use: Low Risk  (11/13/2024)    Patient History     Smoking Tobacco Use: Never     Smokeless Tobacco Use: Never     Passive Exposure: Never   Financial Resource Strain: Low Risk  (11/13/2024)    Financial Resource Strain     Unable to Get: None   Stress: Not on file   Physical Activity: Not on file   Food Insecurity: Low Risk  (11/13/2024)    Food Insecurity     Worried about Food: Never true     Food is Gone: Never true   Transportation Needs: Not At Risk (11/13/2024)    Transportation Needs     Lack of Reliable Transportation: No   Inadequate Housing: Low Risk  (11/13/2024)    Inadequate Housing     Living Situation: I have a steady place to live     Housing Problems: None of the above    Depression: Not at risk (11/13/2024)    PHQ-2     PHQ-2 Score: 0   Utilities: Not At Risk (11/13/2024)    Utilities     Threatened with loss of utilities: No        Physical Exam    Vitals:    11/12/24 2245 11/12/24 2315 11/13/24 0049 11/13/24 0058   BP: 104/41 122/64 132/65    BP Location:   RUE - Right upper extremity    Patient Position:   Supine    Pulse: 69 68 96    Resp: 18 17 16    Temp:   97.5 °F (36.4 °C)    TempSrc:   Axillary    SpO2: 95% 95% 98%    Weight:    71.5 kg (157 lb 10.1 oz)   Height:    5' 8\" (1.727 m)       Pulse Oximetry: 95 on room air which is normal (independently reviewed and interpreted by me)    Available triage notes, nursing notes and vital signs reviewed by me.    Constitutional:  Appears well-developed and well-nourished.    Head: Normocephalic and atraumatic.    Nose: Normal.    Mouth/Throat: Oropharynx is clear and moist.    Eyes: Conjunctivae are normal. Pupils are equal, round, and reactive to light. No scleral icterus.    Ears: External ears without gross abnormalities.    Neck: Normal range of motion. No tracheal deviation present.    Cardiovascular: Normal rate, regular rhythm.    Respiratory/Chest: Normal respiratory effort.  Diminished breath sounds right base compared to left.    GI: Soft. Not distended. No focal tenderness.     Musculoskeletal: Chronic venous stasis changes noted to both lower extremities.  Patient has 4+ edema bilateral lower extremities    Neurological: Awake and alert, strength and sensation grossly intact, speech is fluent    Skin: Skin is warm and dry, no obvious rash.     Psych: Appropriate mood and affect for condition      Diagnostics:    I have independently reviewed and interpreted the rhythm strip as: Accelerated junctional rhythm rate 88.  Incomplete right bundle branch block.  Nonspecific ST and T wave changes.    I have independently reviewed and interpreted the EKG as: 2041: Accelerated junctional rhythm rate 88.  Incomplete right bundle  branch block.  Nonspecific ST and T wave changes.       Laboratory summary     Results for orders placed or performed during the hospital encounter of 11/12/24   Comprehensive Metabolic Panel    Specimen: Blood, Venous   Result Value Ref Range    Fasting Status      Sodium 128 (L) 135 - 145 mmol/L    Potassium 4.5 3.4 - 5.1 mmol/L    Chloride 94 (L) 97 - 110 mmol/L    Carbon Dioxide 30 21 - 32 mmol/L    Anion Gap 9 7 - 19 mmol/L    Glucose 61 (L) 70 - 99 mg/dL    BUN 26 (H) 6 - 20 mg/dL    Creatinine 2.69 (H) 0.67 - 1.17 mg/dL    Glomerular Filtration Rate 22 (L) >=60    BUN/Cr 10 7 - 25    Calcium 8.6 8.4 - 10.2 mg/dL    Bilirubin, Total 0.8 0.2 - 1.0 mg/dL    GOT/AST 16 <=37 Units/L    GPT/ALT 13 <64 Units/L    Alkaline Phosphatase 87 45 - 117 Units/L    Albumin 3.1 (L) 3.4 - 5.0 g/dL    Protein, Total 6.9 6.4 - 8.2 g/dL    Globulin 3.8 2.0 - 4.0 g/dL    A/G Ratio 0.8 (L) 1.0 - 2.4   TROPONIN I, HIGH SENSITIVITY    Specimen: Blood, Venous   Result Value Ref Range    Troponin I, High Sensitivity 25 <77 ng/L   Prothrombin Time (INR/PT)    Specimen: Blood, Venous   Result Value Ref Range    Protime- PT 13.4 (H) 9.7 - 11.8 sec    INR 1.3     Partial Thromboplastin Time (PTT)    Specimen: Blood, Venous   Result Value Ref Range    PTT 30 22 - 32 sec   Lactic Acid Venous With Reflex    Specimen: Blood, Venous   Result Value Ref Range    Lactate, Venous 1.8 0.0 - 2.0 mmol/L   Magnesium    Specimen: Blood, Venous   Result Value Ref Range    Magnesium 1.8 1.7 - 2.4 mg/dL   COVID/Flu/RSV panel    Specimen: Nasal, Mid-turbinate; Swab   Result Value Ref Range    Rapid SARS-COV-2 by PCR Not Detected Not Detected / Detected / Presumptive Positive / Inhibitors present    Influenza A by PCR Not Detected Not Detected    Influenza B by PCR Not Detected Not Detected    RSV BY PCR Not Detected Not Detected    Isolation Guidelines      Procedural Comment     CBC with Automated Differential (performable only)    Specimen: Blood, Venous    Result Value Ref Range    WBC 5.0 4.2 - 11.0 K/mcL    RBC 2.79 (L) 4.50 - 5.90 mil/mcL    HGB 8.7 (L) 13.0 - 17.0 g/dL    HCT 27.2 (L) 39.0 - 51.0 %    MCV 97.5 78.0 - 100.0 fl    MCH 31.2 26.0 - 34.0 pg    MCHC 32.0 32.0 - 36.5 g/dL    RDW-CV 16.1 (H) 11.0 - 15.0 %    RDW-SD 57.2 (H) 39.0 - 50.0 fL    PLT 95 (L) 140 - 450 K/mcL    NRBC 0 <=0 /100 WBC    Neutrophil, Percent 92 %    Lymphocytes, Percent 5 %    Mono, Percent 2 %    Eosinophils, Percent 0 %    Basophils, Percent 0 %    Immature Granulocytes 1 %    Absolute Neutrophils 4.6 1.8 - 7.7 K/mcL    Absolute Lymphocytes 0.2 (L) 1.0 - 4.0 K/mcL    Absolute Monocytes 0.1 (L) 0.3 - 0.9 K/mcL    Absolute Eosinophils  0.0 0.0 - 0.5 K/mcL    Absolute Basophils 0.0 0.0 - 0.3 K/mcL    Absolute Immature Granulocytes 0.0 0.0 - 0.2 K/mcL   Manual Differential    Specimen: Blood, Venous   Result Value Ref Range    Large Platelets Present    NT proBNP    Specimen: Blood, Venous   Result Value Ref Range    NT-proBNP 16,193 (H) <=450 pg/mL   Phosphorus    Specimen: Blood, Venous   Result Value Ref Range    Phosphorus 3.1 2.4 - 4.7 mg/dL   Blood Culture    Specimen: Blood, Venous   Result Value Ref Range    Culture, Blood or Bone Marrow No Growth <24 hours    Blood Culture    Specimen: Blood   Result Value Ref Range    Culture, Blood or Bone Marrow No Growth <24 hours    GLUCOSE, BEDSIDE - POINT OF CARE    Specimen: Blood   Result Value Ref Range    GLUCOSE, BEDSIDE - POINT OF CARE 57 (L) 70 - 99 mg/dL   GLUCOSE, BEDSIDE - POINT OF CARE    Specimen: Blood   Result Value Ref Range    GLUCOSE, BEDSIDE - POINT OF CARE 120 (H) 70 - 99 mg/dL       Laboratory results above independently reviewed and interpreted by me.       Radiography/Imaging    XR CHEST AP OR PA   Final Result      1. Mild interval increase in the small right pleural effusion with adjacent   right basilar airspace disease.   2. Stable enlargement of the cardiomediastinal silhouette without overt   pulmonary  vascular congestion.            Electronically Signed by: SHARON CASTELLANOS M.D.    Signed on: 11/12/2024 9:14 PM    Workstation ID: [id number]          Imaging result above independently reviewed and interpreted by me.           Medical Decision Making including ED Course and Interventions    Assessment:    Myron Torres is a 89 year old male who presents with shaking chills today.  No known fever at home.  Patient had a temperature of 99.3 on arrival to the ED.  He is also complaining of worsening edema in his extremities.  Patient has a history of end-stage renal disease and is dialyzed 4 times a week.  He is due tomorrow.  His labs were unremarkable.  No leukocytosis.  Lactic was normal.  Blood cultures were sent.  Patient makes very little urine and could not provide a sample.  Antibiotics withheld at this time.  Patient will be admitted for observation by [facility].  Dr. Garcia agrees to consult for nephrology.             Orders Placed in the ED  Orders Placed This Encounter    XR CHEST AP OR PA    CBC with Automated Differential    Comprehensive Metabolic Panel    TROPONIN I, HIGH SENSITIVITY    Prothrombin Time (INR/PT)    Partial Thromboplastin Time (PTT)    Lactic Acid Venous With Reflex    Magnesium    COVID/Flu/RSV panel    CBC with Automated Differential (performable only)    [place] Draw Light Blue Top Collected? No Labels; Red Top Collected? No Labels; Light Green Top Collected? No Labels; Lavender Top Collected? No Labels; Gold Top Collected? 1 Label; Pink Top Collected? 1 Label; Grey Top Collected? No Labels    Gold Top    Pink Top Tube    Manual Differential    NT proBNP    Phosphorus    CBC with Automated Differential    Basic Metabolic Panel    CBC with Automated Differential (performable only)    Electrocardiogram 12-Lead    Electrocardiogram 12-Lead    Full Resuscitation    Inpatient consult to Nephrology    Contact & Droplet with N95 Isolation    Oxygen Therapy PRN greater than 90%     Insert IV    Assign to Observation    Blood Culture    Renal (2400mg Na+, 60mEq K+, 1000mg P) Diet    Saline Lock    Cardiac monitoring    No Isolation    Vital Signs    Pulse Oximetry    Bladder scan    Urinary Retention Protocol    Notify: per Routine Standards    Activity    Intermittent Pneumatic Sequential Compression Device (SCDs)    Notify: Metered blood glucose    Notify: Medications    Document:    For signs of hypoglycemia, do STAT metered blood glucose.    For treatment if patient awake, responsive, able to take PO and blood glucose is 40-70:    For treatment if patient awake, responsive, able to take PO and blood glucose is <40:    GLUCOSE, BEDSIDE - POINT OF CARE    Metered blood glucose    Metered Blood Glucose    Metered Blood Glucose    Metered Blood Glucose    Metered blood glucose    GLUCOSE, BEDSIDE - POINT OF CARE    sodium chloride 0.9 % injection 10 mL    sodium chloride 0.9 % injection 2 mL    heparin (porcine) injection 5,000 Units    OR Linked Order Group     acetaminophen (TYLENOL) tablet 650 mg     acetaminophen (TYLENOL) suppository 650 mg    OR Linked Order Group     ondansetron (ZOFRAN ODT) disintegrating tablet 4 mg     ondansetron (ZOFRAN) injection 4 mg    dextrose 50 % injection 25 g    dextrose 50 % injection 12.5 g    glucagon (GLUCAGEN) injection 1 mg    dextrose (GLUTOSE) 40 % gel 15 g    dextrose (GLUTOSE) 40 % gel 30 g    loratadine (CLARITIN) tablet 10 mg    pantoprazole (PROTONIX) EC tablet 40 mg         Reassessment    ED Course as of 11/13/24 0342   Tue Nov 12, 2024   [id number] [facility] agrees to admit for observation.  Dr. Garcia agrees to consult for nephrology. [VS]      ED Course User Index  [VS] Milly Lee DO       External records reviewed and documented as above.    Care affected by social determinants of health as documented above.    I personally considered admission/escalation of hospital level care vs discharge vs other disposition from the  ED.    Disposition        Prescriptions given or considered    Current Discharge Medication List          Patient instructed and strongly encouraged to return immediately to the Emergency Department for any new, persistent, recurrent or worsening symptoms.  The patient was instructed to follow up with a primary care doctor or specialist as soon as possible. Written aftercare and follow-up instructions were discussed with and acknowledged by the patient.    FINAL CLINICAL IMPRESSION    1. Hypervolemia, unspecified hypervolemia type    2. ESRD (end stage renal disease)  (CMD)    3. Chills (without fever)        11/13/2024  DO Rosa James Valerie A, DO  11/13/24 0344
